# Patient Record
Sex: FEMALE | Race: WHITE | Employment: OTHER
[De-identification: names, ages, dates, MRNs, and addresses within clinical notes are randomized per-mention and may not be internally consistent; named-entity substitution may affect disease eponyms.]

---

## 2017-01-06 DIAGNOSIS — R10.11 RUQ ABDOMINAL PAIN: Primary | ICD-10-CM

## 2017-01-09 ENCOUNTER — TELEPHONE (OUTPATIENT)
Facility: CLINIC | Age: 77
End: 2017-01-09

## 2017-01-09 ENCOUNTER — OFFICE VISIT (OUTPATIENT)
Facility: CLINIC | Age: 77
End: 2017-01-09

## 2017-01-09 VITALS
HEART RATE: 96 BPM | OXYGEN SATURATION: 97 % | DIASTOLIC BLOOD PRESSURE: 72 MMHG | SYSTOLIC BLOOD PRESSURE: 130 MMHG | RESPIRATION RATE: 16 BRPM | BODY MASS INDEX: 31.51 KG/M2 | WEIGHT: 156 LBS

## 2017-01-09 DIAGNOSIS — N39.46 MIXED INCONTINENCE: ICD-10-CM

## 2017-01-09 DIAGNOSIS — K31.84 DIABETIC GASTROPARESIS (HCC): ICD-10-CM

## 2017-01-09 DIAGNOSIS — N39.41 URGE INCONTINENCE: ICD-10-CM

## 2017-01-09 DIAGNOSIS — E11.8 TYPE 2 DIABETES MELLITUS WITH COMPLICATION, WITHOUT LONG-TERM CURRENT USE OF INSULIN (HCC): ICD-10-CM

## 2017-01-09 DIAGNOSIS — G89.29 CHRONIC BILATERAL LOW BACK PAIN, WITH SCIATICA PRESENCE UNSPECIFIED: ICD-10-CM

## 2017-01-09 DIAGNOSIS — R07.89 CHEST TIGHTNESS OR PRESSURE: ICD-10-CM

## 2017-01-09 DIAGNOSIS — I10 ESSENTIAL HYPERTENSION: Primary | ICD-10-CM

## 2017-01-09 DIAGNOSIS — M54.5 CHRONIC BILATERAL LOW BACK PAIN, WITH SCIATICA PRESENCE UNSPECIFIED: ICD-10-CM

## 2017-01-09 DIAGNOSIS — K21.9 GASTROESOPHAGEAL REFLUX DISEASE WITHOUT ESOPHAGITIS: ICD-10-CM

## 2017-01-09 DIAGNOSIS — E11.43 DIABETIC GASTROPARESIS (HCC): ICD-10-CM

## 2017-01-09 DIAGNOSIS — E55.9 VITAMIN D DEFICIENCY: ICD-10-CM

## 2017-01-09 DIAGNOSIS — K44.9 HIATAL HERNIA: ICD-10-CM

## 2017-01-09 DIAGNOSIS — Z23 NEED FOR PROPHYLACTIC VACCINATION AGAINST STREPTOCOCCUS PNEUMONIAE (PNEUMOCOCCUS): ICD-10-CM

## 2017-01-09 DIAGNOSIS — E11.42 DIABETIC PERIPHERAL NEUROPATHY (HCC): ICD-10-CM

## 2017-01-09 DIAGNOSIS — D69.6 THROMBOCYTOPENIA (HCC): ICD-10-CM

## 2017-01-09 DIAGNOSIS — D64.9 ANEMIA, UNSPECIFIED TYPE: ICD-10-CM

## 2017-01-09 DIAGNOSIS — R41.81 AGE-RELATED COGNITIVE DECLINE: ICD-10-CM

## 2017-01-09 DIAGNOSIS — E78.00 PURE HYPERCHOLESTEROLEMIA: ICD-10-CM

## 2017-01-09 PROCEDURE — 90670 PCV13 VACCINE IM: CPT | Performed by: FAMILY MEDICINE

## 2017-01-09 PROCEDURE — G0009 ADMIN PNEUMOCOCCAL VACCINE: HCPCS | Performed by: FAMILY MEDICINE

## 2017-01-09 PROCEDURE — 99214 OFFICE O/P EST MOD 30 MIN: CPT | Performed by: FAMILY MEDICINE

## 2017-01-09 RX ORDER — GLIPIZIDE 5 MG/1
TABLET ORAL
Qty: 30 TABLET | Refills: 0
Start: 2017-01-09 | End: 2017-10-25 | Stop reason: SDUPTHER

## 2017-01-09 ASSESSMENT — ENCOUNTER SYMPTOMS
ABDOMINAL PAIN: 0
SHORTNESS OF BREATH: 0
NAUSEA: 0
CONSTIPATION: 0
CHEST TIGHTNESS: 1
SORE THROAT: 0
RHINORRHEA: 0
BACK PAIN: 1
COUGH: 0
DIARRHEA: 0
VOMITING: 0
ABDOMINAL DISTENTION: 0

## 2017-01-10 ENCOUNTER — TELEPHONE (OUTPATIENT)
Facility: CLINIC | Age: 77
End: 2017-01-10

## 2017-02-21 ENCOUNTER — HOSPITAL ENCOUNTER (OUTPATIENT)
Dept: CT IMAGING | Age: 77
Discharge: HOME | End: 2017-02-21
Payer: MEDICARE

## 2017-02-21 ENCOUNTER — HOSPITAL ENCOUNTER (OUTPATIENT)
Dept: INTERVENTIONAL RADIOLOGY/VASCULAR | Age: 77
Discharge: HOME | End: 2017-02-21
Payer: MEDICARE

## 2017-02-21 VITALS
RESPIRATION RATE: 20 BRPM | OXYGEN SATURATION: 94 % | WEIGHT: 157 LBS | SYSTOLIC BLOOD PRESSURE: 138 MMHG | HEIGHT: 59 IN | TEMPERATURE: 97.2 F | BODY MASS INDEX: 31.65 KG/M2 | DIASTOLIC BLOOD PRESSURE: 79 MMHG | HEART RATE: 80 BPM

## 2017-02-21 DIAGNOSIS — G89.29 CHRONIC BILATERAL LOW BACK PAIN, WITH SCIATICA PRESENCE UNSPECIFIED: ICD-10-CM

## 2017-02-21 DIAGNOSIS — M54.5 CHRONIC BILATERAL LOW BACK PAIN, WITH SCIATICA PRESENCE UNSPECIFIED: ICD-10-CM

## 2017-02-21 DIAGNOSIS — M54.10 RADICULOPATHY, UNSPECIFIED SPINAL REGION: ICD-10-CM

## 2017-02-21 LAB
INR BLD: 1
PARTIAL THROMBOPLASTIN TIME: 26.4 SEC (ref 23–31)
PLATELET # BLD: 86 K/UL (ref 150–450)
PROTHROMBIN TIME: 10.8 SEC (ref 9.7–12)

## 2017-02-21 PROCEDURE — 85049 AUTOMATED PLATELET COUNT: CPT

## 2017-02-21 PROCEDURE — 72126 CT NECK SPINE W/DYE: CPT

## 2017-02-21 PROCEDURE — G9500 RAD EXPOS IND/EXP TM DOC: HCPCS | Performed by: RADIOLOGY

## 2017-02-21 PROCEDURE — 6360000002 HC RX W HCPCS: Performed by: RADIOLOGY

## 2017-02-21 PROCEDURE — 7100000030 HC ASPR PHASE II RECOVERY - FIRST 15 MIN

## 2017-02-21 PROCEDURE — 85730 THROMBOPLASTIN TIME PARTIAL: CPT

## 2017-02-21 PROCEDURE — 6360000004 HC RX CONTRAST MEDICATION: Performed by: NEUROLOGICAL SURGERY

## 2017-02-21 PROCEDURE — 62284 INJECTION FOR MYELOGRAM: CPT | Performed by: RADIOLOGY

## 2017-02-21 PROCEDURE — 36415 COLL VENOUS BLD VENIPUNCTURE: CPT

## 2017-02-21 PROCEDURE — 72240 MYELOGRAPHY NECK SPINE: CPT

## 2017-02-21 PROCEDURE — 7100000031 HC ASPR PHASE II RECOVERY - ADDTL 15 MIN

## 2017-02-21 PROCEDURE — 2500000003 HC RX 250 WO HCPCS: Performed by: RADIOLOGY

## 2017-02-21 PROCEDURE — 77003 FLUOROGUIDE FOR SPINE INJECT: CPT | Performed by: RADIOLOGY

## 2017-02-21 PROCEDURE — 61055 INJECTION INTO BRAIN CANAL: CPT

## 2017-02-21 PROCEDURE — 85610 PROTHROMBIN TIME: CPT

## 2017-02-21 RX ORDER — ACETAMINOPHEN 325 MG/1
650 TABLET ORAL EVERY 4 HOURS PRN
Status: DISCONTINUED | OUTPATIENT
Start: 2017-02-21 | End: 2017-02-24 | Stop reason: HOSPADM

## 2017-02-21 RX ORDER — SODIUM CHLORIDE 0.9 % (FLUSH) 0.9 %
10 SYRINGE (ML) INJECTION PRN
Status: DISCONTINUED | OUTPATIENT
Start: 2017-02-21 | End: 2017-02-24 | Stop reason: HOSPADM

## 2017-02-21 RX ORDER — DIPHENHYDRAMINE HYDROCHLORIDE 50 MG/ML
50 INJECTION INTRAMUSCULAR; INTRAVENOUS ONCE
Status: COMPLETED | OUTPATIENT
Start: 2017-02-21 | End: 2017-02-21

## 2017-02-21 RX ORDER — METHYLPREDNISOLONE SODIUM SUCCINATE 125 MG/2ML
125 INJECTION, POWDER, LYOPHILIZED, FOR SOLUTION INTRAMUSCULAR; INTRAVENOUS ONCE
Status: COMPLETED | OUTPATIENT
Start: 2017-02-21 | End: 2017-02-21

## 2017-02-21 RX ORDER — LIDOCAINE HYDROCHLORIDE 10 MG/ML
INJECTION, SOLUTION EPIDURAL; INFILTRATION; INTRACAUDAL; PERINEURAL
Status: COMPLETED | OUTPATIENT
Start: 2017-02-21 | End: 2017-02-21

## 2017-02-21 RX ADMIN — IOHEXOL 20 ML: 300 INJECTION, SOLUTION INTRAVENOUS at 13:06

## 2017-02-21 RX ADMIN — DIPHENHYDRAMINE HYDROCHLORIDE 50 MG: 50 INJECTION, SOLUTION INTRAMUSCULAR; INTRAVENOUS at 12:15

## 2017-02-21 RX ADMIN — IOHEXOL 1 ML: 300 INJECTION, SOLUTION INTRAVENOUS at 13:38

## 2017-02-21 RX ADMIN — METHYLPREDNISOLONE SODIUM SUCCINATE 125 MG: 125 INJECTION, POWDER, FOR SOLUTION INTRAMUSCULAR; INTRAVENOUS at 12:15

## 2017-02-21 RX ADMIN — LIDOCAINE HYDROCHLORIDE 4 ML: 10 INJECTION, SOLUTION EPIDURAL; INFILTRATION; INTRACAUDAL; PERINEURAL at 12:39

## 2017-02-21 ASSESSMENT — PAIN SCALES - GENERAL
PAINLEVEL_OUTOF10: 0
PAINLEVEL_OUTOF10: 0

## 2017-03-20 ENCOUNTER — HOSPITAL ENCOUNTER (OUTPATIENT)
Dept: PHYSICAL THERAPY | Age: 77
Setting detail: THERAPIES SERIES
Discharge: HOME | End: 2017-03-20
Payer: MEDICARE

## 2017-03-20 DIAGNOSIS — E55.9 VITAMIN D DEFICIENCY: ICD-10-CM

## 2017-03-20 PROCEDURE — G8979 MOBILITY GOAL STATUS: HCPCS

## 2017-03-20 PROCEDURE — 97162 PT EVAL MOD COMPLEX 30 MIN: CPT

## 2017-03-20 PROCEDURE — G8978 MOBILITY CURRENT STATUS: HCPCS

## 2017-03-20 RX ORDER — ERGOCALCIFEROL 1.25 MG/1
CAPSULE ORAL
Qty: 12 CAPSULE | Refills: 0 | Status: SHIPPED | OUTPATIENT
Start: 2017-03-20 | End: 2017-06-08 | Stop reason: ALTCHOICE

## 2017-03-20 ASSESSMENT — ACTIVITIES OF DAILY LIVING (ADL): EFFECT OF PAIN ON DAILY ACTIVITIES: 100% OF HER ADL'S

## 2017-03-20 ASSESSMENT — PAIN DESCRIPTION - DESCRIPTORS: DESCRIPTORS: ACHING;BURNING;DISCOMFORT

## 2017-03-20 ASSESSMENT — PAIN DESCRIPTION - FREQUENCY: FREQUENCY: CONTINUOUS

## 2017-03-20 ASSESSMENT — PAIN DESCRIPTION - LOCATION: LOCATION: BACK

## 2017-03-20 ASSESSMENT — PAIN DESCRIPTION - PAIN TYPE: TYPE: SURGICAL PAIN

## 2017-03-20 ASSESSMENT — PAIN SCALES - GENERAL: PAINLEVEL_OUTOF10: 10

## 2017-03-20 ASSESSMENT — PAIN DESCRIPTION - ORIENTATION: ORIENTATION: RIGHT;LOWER

## 2017-03-20 ASSESSMENT — PAIN DESCRIPTION - ONSET: ONSET: ON-GOING

## 2017-03-20 ASSESSMENT — PAIN DESCRIPTION - PROGRESSION: CLINICAL_PROGRESSION: GRADUALLY WORSENING

## 2017-03-21 ENCOUNTER — HOSPITAL ENCOUNTER (OUTPATIENT)
Dept: PHYSICAL THERAPY | Age: 77
Setting detail: THERAPIES SERIES
Discharge: HOME | End: 2017-03-21
Payer: MEDICARE

## 2017-03-21 PROCEDURE — 97113 AQUATIC THERAPY/EXERCISES: CPT

## 2017-03-22 PROBLEM — Z98.1 S/P LUMBAR FUSION: Status: ACTIVE | Noted: 2017-03-22

## 2017-03-23 ENCOUNTER — HOSPITAL ENCOUNTER (OUTPATIENT)
Dept: PHYSICAL THERAPY | Age: 77
Setting detail: THERAPIES SERIES
Discharge: HOME | End: 2017-03-23
Payer: MEDICARE

## 2017-03-23 PROCEDURE — 97113 AQUATIC THERAPY/EXERCISES: CPT

## 2017-03-23 ASSESSMENT — PAIN SCALES - GENERAL: PAINLEVEL_OUTOF10: 7

## 2017-03-23 ASSESSMENT — PAIN DESCRIPTION - PAIN TYPE: TYPE: SURGICAL PAIN

## 2017-03-23 ASSESSMENT — PAIN DESCRIPTION - ORIENTATION: ORIENTATION: RIGHT

## 2017-03-23 ASSESSMENT — PAIN DESCRIPTION - LOCATION: LOCATION: BUTTOCKS

## 2017-03-28 ENCOUNTER — HOSPITAL ENCOUNTER (OUTPATIENT)
Dept: PHYSICAL THERAPY | Age: 77
Setting detail: THERAPIES SERIES
Discharge: HOME | End: 2017-03-28
Payer: MEDICARE

## 2017-03-28 PROCEDURE — 97113 AQUATIC THERAPY/EXERCISES: CPT

## 2017-03-28 ASSESSMENT — PAIN DESCRIPTION - ORIENTATION: ORIENTATION: LOWER;LEFT

## 2017-03-28 ASSESSMENT — PAIN DESCRIPTION - PAIN TYPE: TYPE: SURGICAL PAIN

## 2017-03-28 ASSESSMENT — PAIN DESCRIPTION - DESCRIPTORS: DESCRIPTORS: ACHING;BURNING;DISCOMFORT

## 2017-03-28 ASSESSMENT — PAIN DESCRIPTION - LOCATION: LOCATION: BACK;BUTTOCKS

## 2017-03-28 ASSESSMENT — PAIN SCALES - GENERAL: PAINLEVEL_OUTOF10: 3

## 2017-03-30 ENCOUNTER — HOSPITAL ENCOUNTER (OUTPATIENT)
Dept: PHYSICAL THERAPY | Age: 77
Setting detail: THERAPIES SERIES
Discharge: HOME | End: 2017-03-30
Payer: MEDICARE

## 2017-03-30 PROCEDURE — 97113 AQUATIC THERAPY/EXERCISES: CPT

## 2017-03-30 ASSESSMENT — PAIN SCALES - GENERAL: PAINLEVEL_OUTOF10: 5

## 2017-03-30 ASSESSMENT — PAIN DESCRIPTION - PAIN TYPE: TYPE: SURGICAL PAIN

## 2017-03-30 ASSESSMENT — PAIN DESCRIPTION - ORIENTATION: ORIENTATION: LOWER;LEFT;RIGHT

## 2017-03-30 ASSESSMENT — PAIN DESCRIPTION - LOCATION: LOCATION: BACK;BUTTOCKS

## 2017-03-31 ENCOUNTER — HOSPITAL ENCOUNTER (OUTPATIENT)
Dept: PHYSICAL THERAPY | Age: 77
Setting detail: THERAPIES SERIES
Discharge: HOME | End: 2017-03-31
Payer: MEDICARE

## 2017-03-31 PROCEDURE — 97113 AQUATIC THERAPY/EXERCISES: CPT

## 2017-03-31 ASSESSMENT — PAIN SCALES - GENERAL: PAINLEVEL_OUTOF10: 2

## 2017-03-31 ASSESSMENT — PAIN DESCRIPTION - LOCATION: LOCATION: BACK;BUTTOCKS

## 2017-03-31 ASSESSMENT — PAIN DESCRIPTION - PAIN TYPE: TYPE: SURGICAL PAIN

## 2017-03-31 ASSESSMENT — PAIN DESCRIPTION - ORIENTATION: ORIENTATION: LOWER;LEFT;RIGHT

## 2017-04-04 ENCOUNTER — HOSPITAL ENCOUNTER (OUTPATIENT)
Dept: PHYSICAL THERAPY | Age: 77
Setting detail: THERAPIES SERIES
Discharge: HOME | End: 2017-04-04
Payer: MEDICARE

## 2017-04-04 PROCEDURE — 97113 AQUATIC THERAPY/EXERCISES: CPT

## 2017-04-04 ASSESSMENT — PAIN DESCRIPTION - PAIN TYPE: TYPE: SURGICAL PAIN

## 2017-04-04 ASSESSMENT — PAIN DESCRIPTION - LOCATION: LOCATION: BACK;BUTTOCKS

## 2017-04-04 ASSESSMENT — PAIN DESCRIPTION - ORIENTATION: ORIENTATION: LOWER;LEFT;RIGHT

## 2017-04-04 ASSESSMENT — PAIN SCALES - GENERAL: PAINLEVEL_OUTOF10: 4

## 2017-04-06 ENCOUNTER — HOSPITAL ENCOUNTER (OUTPATIENT)
Dept: PHYSICAL THERAPY | Age: 77
Setting detail: THERAPIES SERIES
Discharge: HOME | End: 2017-04-06
Payer: MEDICARE

## 2017-04-06 ENCOUNTER — HOSPITAL ENCOUNTER (OUTPATIENT)
Age: 77
Setting detail: SPECIMEN
Discharge: HOME | End: 2017-04-06
Payer: MEDICARE

## 2017-04-06 DIAGNOSIS — R41.81 AGE-RELATED COGNITIVE DECLINE: ICD-10-CM

## 2017-04-06 DIAGNOSIS — E11.42 DIABETIC PERIPHERAL NEUROPATHY (HCC): ICD-10-CM

## 2017-04-06 DIAGNOSIS — D64.9 ANEMIA, UNSPECIFIED TYPE: ICD-10-CM

## 2017-04-06 DIAGNOSIS — E11.8 TYPE 2 DIABETES MELLITUS WITH COMPLICATION, WITHOUT LONG-TERM CURRENT USE OF INSULIN (HCC): ICD-10-CM

## 2017-04-06 DIAGNOSIS — I10 ESSENTIAL HYPERTENSION: ICD-10-CM

## 2017-04-06 DIAGNOSIS — D69.6 THROMBOCYTOPENIA (HCC): ICD-10-CM

## 2017-04-06 DIAGNOSIS — K31.84 DIABETIC GASTROPARESIS (HCC): ICD-10-CM

## 2017-04-06 DIAGNOSIS — E11.43 DIABETIC GASTROPARESIS (HCC): ICD-10-CM

## 2017-04-06 LAB
ANION GAP SERPL CALCULATED.3IONS-SCNC: 16 MMOL/L (ref 9–17)
BUN BLDV-MCNC: 17 MG/DL (ref 8–23)
BUN/CREAT BLD: NORMAL (ref 9–20)
CALCIUM SERPL-MCNC: 9.3 MG/DL (ref 8.6–10.4)
CHLORIDE BLD-SCNC: 105 MMOL/L (ref 98–107)
CO2: 21 MMOL/L (ref 20–31)
CREAT SERPL-MCNC: 0.69 MG/DL (ref 0.5–0.9)
CREATININE URINE: 105.7 MG/DL (ref 28–217)
ESTIMATED AVERAGE GLUCOSE: 123 MG/DL
GFR AFRICAN AMERICAN: >60 ML/MIN
GFR NON-AFRICAN AMERICAN: >60 ML/MIN
GFR SERPL CREATININE-BSD FRML MDRD: NORMAL ML/MIN/{1.73_M2}
GFR SERPL CREATININE-BSD FRML MDRD: NORMAL ML/MIN/{1.73_M2}
GLUCOSE BLD-MCNC: 95 MG/DL (ref 70–99)
HBA1C MFR BLD: 5.9 % (ref 4–6)
HCT VFR BLD CALC: 29.8 % (ref 36–46)
HEMOGLOBIN: 9.9 G/DL (ref 12–16)
MCH RBC QN AUTO: 27 PG (ref 26–34)
MCHC RBC AUTO-ENTMCNC: 33.3 G/DL (ref 31–37)
MCV RBC AUTO: 81.2 FL (ref 80–100)
MICROALBUMIN/CREAT 24H UR: 13 MG/L
MICROALBUMIN/CREAT UR-RTO: 12 MCG/MG CREAT
PDW BLD-RTO: 15.4 % (ref 12.5–15.4)
PLATELET # BLD: 83 K/UL (ref 140–450)
PMV BLD AUTO: 11.9 FL (ref 6–12)
POTASSIUM SERPL-SCNC: 4 MMOL/L (ref 3.7–5.3)
RBC # BLD: 3.67 M/UL (ref 4–5.2)
SODIUM BLD-SCNC: 142 MMOL/L (ref 135–144)
WBC # BLD: 5.2 K/UL (ref 3.5–11)

## 2017-04-06 PROCEDURE — 97113 AQUATIC THERAPY/EXERCISES: CPT

## 2017-04-07 ENCOUNTER — HOSPITAL ENCOUNTER (OUTPATIENT)
Dept: PHYSICAL THERAPY | Age: 77
Setting detail: THERAPIES SERIES
Discharge: HOME | End: 2017-04-07
Payer: MEDICARE

## 2017-04-07 PROCEDURE — 97113 AQUATIC THERAPY/EXERCISES: CPT

## 2017-04-07 ASSESSMENT — PAIN DESCRIPTION - PAIN TYPE: TYPE: SURGICAL PAIN

## 2017-04-07 ASSESSMENT — PAIN DESCRIPTION - ORIENTATION: ORIENTATION: LOWER

## 2017-04-07 ASSESSMENT — PAIN DESCRIPTION - LOCATION: LOCATION: BACK;SACRUM

## 2017-04-07 ASSESSMENT — PAIN SCALES - GENERAL: PAINLEVEL_OUTOF10: 4

## 2017-04-10 ENCOUNTER — HOSPITAL ENCOUNTER (OUTPATIENT)
Dept: PHYSICAL THERAPY | Age: 77
Setting detail: THERAPIES SERIES
Discharge: HOME | End: 2017-04-10
Payer: MEDICARE

## 2017-04-10 PROCEDURE — 97113 AQUATIC THERAPY/EXERCISES: CPT

## 2017-04-10 ASSESSMENT — PAIN DESCRIPTION - ORIENTATION: ORIENTATION: LOWER

## 2017-04-10 ASSESSMENT — PAIN SCALES - GENERAL: PAINLEVEL_OUTOF10: 2

## 2017-04-10 ASSESSMENT — PAIN DESCRIPTION - PAIN TYPE: TYPE: SURGICAL PAIN

## 2017-04-10 ASSESSMENT — PAIN DESCRIPTION - LOCATION: LOCATION: BACK;SACRUM

## 2017-04-12 ENCOUNTER — HOSPITAL ENCOUNTER (OUTPATIENT)
Dept: PHYSICAL THERAPY | Age: 77
Setting detail: THERAPIES SERIES
Discharge: HOME | End: 2017-04-12
Payer: MEDICARE

## 2017-04-12 PROBLEM — D69.3 CHRONIC ITP (IDIOPATHIC THROMBOCYTOPENIA) (HCC): Status: ACTIVE | Noted: 2017-04-12

## 2017-04-12 PROCEDURE — G8978 MOBILITY CURRENT STATUS: HCPCS

## 2017-04-12 PROCEDURE — 97113 AQUATIC THERAPY/EXERCISES: CPT

## 2017-04-12 PROCEDURE — G8979 MOBILITY GOAL STATUS: HCPCS

## 2017-04-13 ENCOUNTER — HOSPITAL ENCOUNTER (OUTPATIENT)
Dept: PHYSICAL THERAPY | Age: 77
Setting detail: THERAPIES SERIES
Discharge: HOME | End: 2017-04-13
Payer: MEDICARE

## 2017-04-13 PROCEDURE — 97164 PT RE-EVAL EST PLAN CARE: CPT

## 2017-04-13 PROCEDURE — 97113 AQUATIC THERAPY/EXERCISES: CPT

## 2017-04-13 ASSESSMENT — PAIN DESCRIPTION - LOCATION
LOCATION: BACK;SACRUM
LOCATION: BACK;SACRUM

## 2017-04-13 ASSESSMENT — PAIN SCALES - GENERAL
PAINLEVEL_OUTOF10: 2
PAINLEVEL_OUTOF10: 2

## 2017-04-13 ASSESSMENT — PAIN DESCRIPTION - PAIN TYPE
TYPE: SURGICAL PAIN
TYPE: SURGICAL PAIN

## 2017-04-13 ASSESSMENT — PAIN DESCRIPTION - DESCRIPTORS
DESCRIPTORS: ACHING;CRAMPING
DESCRIPTORS: ACHING;CRAMPING

## 2017-04-13 ASSESSMENT — PAIN DESCRIPTION - DIRECTION: RADIATING_TOWARDS: LEFT LE

## 2017-04-13 ASSESSMENT — PAIN DESCRIPTION - FREQUENCY: FREQUENCY: INTERMITTENT

## 2017-04-13 ASSESSMENT — PAIN DESCRIPTION - ORIENTATION
ORIENTATION: LOWER
ORIENTATION: LOWER

## 2017-04-20 ENCOUNTER — HOSPITAL ENCOUNTER (OUTPATIENT)
Dept: PHYSICAL THERAPY | Age: 77
Setting detail: THERAPIES SERIES
Discharge: HOME | End: 2017-04-20
Payer: MEDICARE

## 2017-04-20 PROCEDURE — 97113 AQUATIC THERAPY/EXERCISES: CPT

## 2017-04-20 ASSESSMENT — PAIN DESCRIPTION - ORIENTATION: ORIENTATION: LOWER;RIGHT

## 2017-04-20 ASSESSMENT — PAIN SCALES - GENERAL: PAINLEVEL_OUTOF10: 4

## 2017-04-20 ASSESSMENT — PAIN DESCRIPTION - PAIN TYPE: TYPE: SURGICAL PAIN

## 2017-04-20 ASSESSMENT — PAIN DESCRIPTION - LOCATION: LOCATION: BACK;BUTTOCKS

## 2017-04-21 ENCOUNTER — HOSPITAL ENCOUNTER (OUTPATIENT)
Dept: PHYSICAL THERAPY | Age: 77
Setting detail: THERAPIES SERIES
Discharge: HOME | End: 2017-04-21
Payer: MEDICARE

## 2017-04-21 PROCEDURE — 97113 AQUATIC THERAPY/EXERCISES: CPT

## 2017-04-21 ASSESSMENT — PAIN DESCRIPTION - DESCRIPTORS: DESCRIPTORS: ACHING;CRAMPING

## 2017-04-21 ASSESSMENT — PAIN DESCRIPTION - ORIENTATION: ORIENTATION: LOWER;RIGHT

## 2017-04-21 ASSESSMENT — PAIN SCALES - GENERAL: PAINLEVEL_OUTOF10: 3

## 2017-04-21 ASSESSMENT — PAIN DESCRIPTION - LOCATION: LOCATION: BACK;BUTTOCKS

## 2017-04-21 ASSESSMENT — PAIN DESCRIPTION - PAIN TYPE: TYPE: SURGICAL PAIN

## 2017-04-24 ENCOUNTER — HOSPITAL ENCOUNTER (OUTPATIENT)
Dept: PHYSICAL THERAPY | Age: 77
Setting detail: THERAPIES SERIES
Discharge: HOME | End: 2017-04-24
Payer: MEDICARE

## 2017-04-24 PROCEDURE — 97113 AQUATIC THERAPY/EXERCISES: CPT

## 2017-04-24 ASSESSMENT — PAIN DESCRIPTION - ORIENTATION: ORIENTATION: LOWER;RIGHT

## 2017-04-24 ASSESSMENT — PAIN SCALES - GENERAL: PAINLEVEL_OUTOF10: 1

## 2017-04-24 ASSESSMENT — PAIN DESCRIPTION - PAIN TYPE: TYPE: SURGICAL PAIN

## 2017-04-24 ASSESSMENT — PAIN DESCRIPTION - DESCRIPTORS: DESCRIPTORS: ACHING

## 2017-04-24 ASSESSMENT — PAIN DESCRIPTION - LOCATION: LOCATION: BACK;BUTTOCKS

## 2017-04-26 ENCOUNTER — HOSPITAL ENCOUNTER (OUTPATIENT)
Dept: PHYSICAL THERAPY | Age: 77
Setting detail: THERAPIES SERIES
Discharge: HOME | End: 2017-04-26
Payer: MEDICARE

## 2017-04-26 ENCOUNTER — OFFICE VISIT (OUTPATIENT)
Dept: GASTROENTEROLOGY | Age: 77
End: 2017-04-26
Payer: MEDICARE

## 2017-04-26 VITALS
WEIGHT: 156.8 LBS | RESPIRATION RATE: 12 BRPM | BODY MASS INDEX: 31.61 KG/M2 | HEIGHT: 59 IN | OXYGEN SATURATION: 98 % | TEMPERATURE: 98 F

## 2017-04-26 DIAGNOSIS — K63.5 COLON POLYP: ICD-10-CM

## 2017-04-26 DIAGNOSIS — K44.9 HIATAL HERNIA: Primary | ICD-10-CM

## 2017-04-26 DIAGNOSIS — R11.0 NAUSEA: ICD-10-CM

## 2017-04-26 DIAGNOSIS — R10.13 EPIGASTRIC BURNING SENSATION: ICD-10-CM

## 2017-04-26 DIAGNOSIS — K21.9 GASTROESOPHAGEAL REFLUX DISEASE WITHOUT ESOPHAGITIS: ICD-10-CM

## 2017-04-26 PROCEDURE — 4040F PNEUMOC VAC/ADMIN/RCVD: CPT | Performed by: INTERNAL MEDICINE

## 2017-04-26 PROCEDURE — G8427 DOCREV CUR MEDS BY ELIG CLIN: HCPCS | Performed by: INTERNAL MEDICINE

## 2017-04-26 PROCEDURE — 3017F COLORECTAL CA SCREEN DOC REV: CPT | Performed by: INTERNAL MEDICINE

## 2017-04-26 PROCEDURE — G8598 ASA/ANTIPLAT THER USED: HCPCS | Performed by: INTERNAL MEDICINE

## 2017-04-26 PROCEDURE — G8400 PT W/DXA NO RESULTS DOC: HCPCS | Performed by: INTERNAL MEDICINE

## 2017-04-26 PROCEDURE — 1036F TOBACCO NON-USER: CPT | Performed by: INTERNAL MEDICINE

## 2017-04-26 PROCEDURE — 1090F PRES/ABSN URINE INCON ASSESS: CPT | Performed by: INTERNAL MEDICINE

## 2017-04-26 PROCEDURE — 1123F ACP DISCUSS/DSCN MKR DOCD: CPT | Performed by: INTERNAL MEDICINE

## 2017-04-26 PROCEDURE — G8417 CALC BMI ABV UP PARAM F/U: HCPCS | Performed by: INTERNAL MEDICINE

## 2017-04-26 PROCEDURE — 99214 OFFICE O/P EST MOD 30 MIN: CPT | Performed by: INTERNAL MEDICINE

## 2017-04-26 RX ORDER — PANTOPRAZOLE SODIUM 20 MG/1
40 TABLET, DELAYED RELEASE ORAL 2 TIMES DAILY
Qty: 180 TABLET | Refills: 3 | Status: SHIPPED | OUTPATIENT
Start: 2017-04-26 | End: 2017-07-18 | Stop reason: DRUGHIGH

## 2017-04-26 RX ORDER — SUCRALFATE ORAL 1 G/10ML
1 SUSPENSION ORAL 4 TIMES DAILY
Qty: 1200 ML | Refills: 3 | Status: SHIPPED | OUTPATIENT
Start: 2017-04-26 | End: 2017-07-18 | Stop reason: SDUPTHER

## 2017-04-26 ASSESSMENT — ENCOUNTER SYMPTOMS
DIARRHEA: 0
ABDOMINAL DISTENTION: 0
CONSTIPATION: 1
SHORTNESS OF BREATH: 0
VOMITING: 1
SINUS PRESSURE: 1
ANAL BLEEDING: 0
COUGH: 1
RECTAL PAIN: 0
ABDOMINAL PAIN: 0
BACK PAIN: 1
ALLERGIC/IMMUNOLOGIC NEGATIVE: 1
BLOOD IN STOOL: 0
NAUSEA: 1

## 2017-04-28 ENCOUNTER — HOSPITAL ENCOUNTER (OUTPATIENT)
Dept: PHYSICAL THERAPY | Age: 77
Setting detail: THERAPIES SERIES
Discharge: HOME | End: 2017-04-28
Payer: MEDICARE

## 2017-04-28 PROCEDURE — 97113 AQUATIC THERAPY/EXERCISES: CPT

## 2017-04-28 ASSESSMENT — PAIN DESCRIPTION - LOCATION: LOCATION: BACK

## 2017-04-28 ASSESSMENT — PAIN DESCRIPTION - PAIN TYPE: TYPE: SURGICAL PAIN

## 2017-04-28 ASSESSMENT — PAIN DESCRIPTION - ORIENTATION: ORIENTATION: LOWER;LEFT;RIGHT

## 2017-04-28 ASSESSMENT — PAIN SCALES - GENERAL: PAINLEVEL_OUTOF10: 4

## 2017-05-03 ENCOUNTER — HOSPITAL ENCOUNTER (OUTPATIENT)
Dept: PHYSICAL THERAPY | Age: 77
Setting detail: THERAPIES SERIES
Discharge: HOME | End: 2017-05-03
Payer: MEDICARE

## 2017-05-03 PROCEDURE — 97113 AQUATIC THERAPY/EXERCISES: CPT

## 2017-05-03 ASSESSMENT — PAIN DESCRIPTION - LOCATION: LOCATION: BACK

## 2017-05-03 ASSESSMENT — PAIN SCALES - GENERAL: PAINLEVEL_OUTOF10: 2

## 2017-05-03 ASSESSMENT — PAIN DESCRIPTION - ORIENTATION: ORIENTATION: LOWER;LEFT;RIGHT

## 2017-05-03 ASSESSMENT — PAIN DESCRIPTION - PAIN TYPE: TYPE: SURGICAL PAIN

## 2017-05-04 ENCOUNTER — HOSPITAL ENCOUNTER (OUTPATIENT)
Dept: PHYSICAL THERAPY | Age: 77
Setting detail: THERAPIES SERIES
Discharge: HOME | End: 2017-05-04
Payer: MEDICARE

## 2017-05-04 PROCEDURE — G8978 MOBILITY CURRENT STATUS: HCPCS

## 2017-05-04 PROCEDURE — 97113 AQUATIC THERAPY/EXERCISES: CPT

## 2017-05-04 PROCEDURE — 97164 PT RE-EVAL EST PLAN CARE: CPT

## 2017-05-04 PROCEDURE — G8979 MOBILITY GOAL STATUS: HCPCS

## 2017-05-04 ASSESSMENT — PAIN DESCRIPTION - DIRECTION: RADIATING_TOWARDS: R BUTTOCK

## 2017-05-04 ASSESSMENT — PAIN DESCRIPTION - PAIN TYPE
TYPE: SURGICAL PAIN
TYPE: SURGICAL PAIN

## 2017-05-04 ASSESSMENT — PAIN SCALES - GENERAL
PAINLEVEL_OUTOF10: 2
PAINLEVEL_OUTOF10: 2

## 2017-05-04 ASSESSMENT — PAIN DESCRIPTION - LOCATION
LOCATION: BACK
LOCATION: BACK

## 2017-05-04 ASSESSMENT — PAIN DESCRIPTION - ORIENTATION: ORIENTATION: LOWER;LEFT;RIGHT

## 2017-05-08 ENCOUNTER — HOSPITAL ENCOUNTER (OUTPATIENT)
Dept: NUCLEAR MEDICINE | Age: 77
Discharge: HOME | End: 2017-05-08
Payer: MEDICARE

## 2017-05-08 DIAGNOSIS — R10.13 EPIGASTRIC BURNING SENSATION: ICD-10-CM

## 2017-05-08 DIAGNOSIS — K63.5 COLON POLYP: ICD-10-CM

## 2017-05-08 DIAGNOSIS — K21.9 GASTROESOPHAGEAL REFLUX DISEASE WITHOUT ESOPHAGITIS: ICD-10-CM

## 2017-05-08 DIAGNOSIS — R11.0 NAUSEA: ICD-10-CM

## 2017-05-08 DIAGNOSIS — K44.9 HIATAL HERNIA: ICD-10-CM

## 2017-05-08 PROCEDURE — A9541 TC99M SULFUR COLLOID: HCPCS | Performed by: INTERNAL MEDICINE

## 2017-05-08 PROCEDURE — 78264 GASTRIC EMPTYING IMG STUDY: CPT

## 2017-05-08 PROCEDURE — 3430000000 HC RX DIAGNOSTIC RADIOPHARMACEUTICAL: Performed by: INTERNAL MEDICINE

## 2017-05-08 RX ADMIN — Medication 1.19 MILLICURIE: at 08:32

## 2017-05-11 ENCOUNTER — HOSPITAL ENCOUNTER (OUTPATIENT)
Dept: PHYSICAL THERAPY | Age: 77
Setting detail: THERAPIES SERIES
Discharge: HOME | End: 2017-05-11
Payer: MEDICARE

## 2017-05-11 PROCEDURE — 97110 THERAPEUTIC EXERCISES: CPT

## 2017-05-11 ASSESSMENT — PAIN DESCRIPTION - LOCATION: LOCATION: BACK

## 2017-05-11 ASSESSMENT — PAIN SCALES - GENERAL: PAINLEVEL_OUTOF10: 6

## 2017-05-11 ASSESSMENT — PAIN DESCRIPTION - ORIENTATION: ORIENTATION: RIGHT;LOWER

## 2017-05-11 ASSESSMENT — PAIN DESCRIPTION - DIRECTION: RADIATING_TOWARDS: RIGHT BUTTOCK

## 2017-05-11 ASSESSMENT — PAIN DESCRIPTION - PAIN TYPE: TYPE: SURGICAL PAIN

## 2017-05-12 ENCOUNTER — HOSPITAL ENCOUNTER (OUTPATIENT)
Dept: NUCLEAR MEDICINE | Age: 77
Discharge: HOME | End: 2017-05-12
Payer: MEDICARE

## 2017-05-12 ENCOUNTER — HOSPITAL ENCOUNTER (OUTPATIENT)
Dept: PHYSICAL THERAPY | Age: 77
Setting detail: THERAPIES SERIES
Discharge: HOME | End: 2017-05-12
Payer: MEDICARE

## 2017-05-12 DIAGNOSIS — K21.9 GASTROESOPHAGEAL REFLUX DISEASE, ESOPHAGITIS PRESENCE NOT SPECIFIED: ICD-10-CM

## 2017-05-12 DIAGNOSIS — K44.9 HIATAL HERNIA: ICD-10-CM

## 2017-05-12 DIAGNOSIS — R11.0 NAUSEA: ICD-10-CM

## 2017-05-12 DIAGNOSIS — R10.13 EPIGASTRIC BURNING SENSATION: ICD-10-CM

## 2017-05-12 DIAGNOSIS — K44.9 HIATAL HERNIA: Primary | ICD-10-CM

## 2017-05-12 PROCEDURE — 78264 GASTRIC EMPTYING IMG STUDY: CPT

## 2017-05-12 PROCEDURE — A9541 TC99M SULFUR COLLOID: HCPCS | Performed by: INTERNAL MEDICINE

## 2017-05-12 PROCEDURE — 3430000000 HC RX DIAGNOSTIC RADIOPHARMACEUTICAL: Performed by: INTERNAL MEDICINE

## 2017-05-12 RX ADMIN — Medication 1.2 MILLICURIE: at 10:05

## 2017-05-15 ENCOUNTER — HOSPITAL ENCOUNTER (OUTPATIENT)
Dept: PHYSICAL THERAPY | Age: 77
Setting detail: THERAPIES SERIES
Discharge: HOME | End: 2017-05-15
Payer: MEDICARE

## 2017-05-15 PROCEDURE — G8980 MOBILITY D/C STATUS: HCPCS

## 2017-05-15 PROCEDURE — G8979 MOBILITY GOAL STATUS: HCPCS

## 2017-05-15 PROCEDURE — 97164 PT RE-EVAL EST PLAN CARE: CPT

## 2017-05-15 ASSESSMENT — PAIN SCALES - GENERAL: PAINLEVEL_OUTOF10: 6

## 2017-05-15 ASSESSMENT — PAIN DESCRIPTION - PAIN TYPE: TYPE: SURGICAL PAIN

## 2017-05-15 ASSESSMENT — PAIN DESCRIPTION - LOCATION: LOCATION: BACK

## 2017-05-15 ASSESSMENT — PAIN DESCRIPTION - ORIENTATION: ORIENTATION: RIGHT;LOWER

## 2017-05-15 ASSESSMENT — PAIN DESCRIPTION - DIRECTION: RADIATING_TOWARDS: RIGHT BUTTOCK

## 2017-05-17 ENCOUNTER — APPOINTMENT (OUTPATIENT)
Dept: PHYSICAL THERAPY | Age: 77
End: 2017-05-17
Payer: MEDICARE

## 2017-06-06 ENCOUNTER — HOSPITAL ENCOUNTER (OUTPATIENT)
Facility: CLINIC | Age: 77
Discharge: HOME | End: 2017-06-06
Payer: MEDICARE

## 2017-06-06 ENCOUNTER — HOSPITAL ENCOUNTER (OUTPATIENT)
Dept: GENERAL RADIOLOGY | Facility: CLINIC | Age: 77
Discharge: HOME | End: 2017-06-06
Payer: MEDICARE

## 2017-06-06 ENCOUNTER — HOSPITAL ENCOUNTER (OUTPATIENT)
Age: 77
Setting detail: SPECIMEN
Discharge: HOME | End: 2017-06-06
Payer: MEDICARE

## 2017-06-06 DIAGNOSIS — D69.3 CHRONIC ITP (IDIOPATHIC THROMBOCYTOPENIA) (HCC): ICD-10-CM

## 2017-06-06 DIAGNOSIS — E11.8 TYPE 2 DIABETES MELLITUS WITH COMPLICATION, WITHOUT LONG-TERM CURRENT USE OF INSULIN (HCC): ICD-10-CM

## 2017-06-06 DIAGNOSIS — E11.42 DIABETIC PERIPHERAL NEUROPATHY (HCC): ICD-10-CM

## 2017-06-06 DIAGNOSIS — E78.00 PURE HYPERCHOLESTEROLEMIA: ICD-10-CM

## 2017-06-06 DIAGNOSIS — D64.9 ANEMIA, UNSPECIFIED TYPE: ICD-10-CM

## 2017-06-06 DIAGNOSIS — E53.8 B12 DEFICIENCY: ICD-10-CM

## 2017-06-06 DIAGNOSIS — M54.5 LOW BACK PAIN, UNSPECIFIED BACK PAIN LATERALITY, UNSPECIFIED CHRONICITY, WITH SCIATICA PRESENCE UNSPECIFIED: ICD-10-CM

## 2017-06-06 DIAGNOSIS — E55.9 VITAMIN D DEFICIENCY: ICD-10-CM

## 2017-06-06 DIAGNOSIS — I10 ESSENTIAL HYPERTENSION: ICD-10-CM

## 2017-06-06 LAB
ALBUMIN SERPL-MCNC: 4.5 G/DL (ref 3.5–5.2)
ALBUMIN/GLOBULIN RATIO: 1.7 (ref 1–2.5)
ALP BLD-CCNC: 54 U/L (ref 35–104)
ALT SERPL-CCNC: 12 U/L (ref 5–33)
ANION GAP SERPL CALCULATED.3IONS-SCNC: 15 MMOL/L (ref 9–17)
AST SERPL-CCNC: 15 U/L
BILIRUB SERPL-MCNC: 0.55 MG/DL (ref 0.3–1.2)
BUN BLDV-MCNC: 20 MG/DL (ref 8–23)
BUN/CREAT BLD: ABNORMAL (ref 9–20)
CALCIUM SERPL-MCNC: 9.6 MG/DL (ref 8.6–10.4)
CHLORIDE BLD-SCNC: 102 MMOL/L (ref 98–107)
CHOLESTEROL/HDL RATIO: 2.4
CHOLESTEROL: 130 MG/DL
CO2: 23 MMOL/L (ref 20–31)
CREAT SERPL-MCNC: 0.73 MG/DL (ref 0.5–0.9)
CREATININE URINE: 65.4 MG/DL (ref 28–217)
ESTIMATED AVERAGE GLUCOSE: 128 MG/DL
GFR AFRICAN AMERICAN: >60 ML/MIN
GFR NON-AFRICAN AMERICAN: >60 ML/MIN
GFR SERPL CREATININE-BSD FRML MDRD: ABNORMAL ML/MIN/{1.73_M2}
GFR SERPL CREATININE-BSD FRML MDRD: ABNORMAL ML/MIN/{1.73_M2}
GLUCOSE BLD-MCNC: 110 MG/DL (ref 70–99)
HBA1C MFR BLD: 6.1 % (ref 4–6)
HCT VFR BLD CALC: 33.4 % (ref 36–46)
HDLC SERPL-MCNC: 55 MG/DL
HEMOGLOBIN: 10.4 G/DL (ref 12–16)
LDL CHOLESTEROL: 53 MG/DL (ref 0–130)
MCH RBC QN AUTO: 24 PG (ref 26–34)
MCHC RBC AUTO-ENTMCNC: 31.2 G/DL (ref 31–37)
MCV RBC AUTO: 77.1 FL (ref 80–100)
MICROALBUMIN/CREAT 24H UR: <12 MG/L
MICROALBUMIN/CREAT UR-RTO: 18 MCG/MG CREAT
PDW BLD-RTO: 16.8 % (ref 12.5–15.4)
PLATELET # BLD: 100 K/UL (ref 140–450)
PMV BLD AUTO: ABNORMAL FL (ref 6–12)
POTASSIUM SERPL-SCNC: 4.4 MMOL/L (ref 3.7–5.3)
RBC # BLD: 4.33 M/UL (ref 4–5.2)
SODIUM BLD-SCNC: 140 MMOL/L (ref 135–144)
TOTAL PROTEIN: 7.1 G/DL (ref 6.4–8.3)
TRIGL SERPL-MCNC: 108 MG/DL
VITAMIN B-12: 309 PG/ML (ref 211–946)
VITAMIN D 25-HYDROXY: 27.8 NG/ML (ref 30–100)
VLDLC SERPL CALC-MCNC: NORMAL MG/DL (ref 1–30)
WBC # BLD: 6.9 K/UL (ref 3.5–11)

## 2017-06-06 PROCEDURE — 72100 X-RAY EXAM L-S SPINE 2/3 VWS: CPT

## 2017-06-16 ENCOUNTER — OFFICE VISIT (OUTPATIENT)
Dept: GASTROENTEROLOGY | Age: 77
End: 2017-06-16
Payer: MEDICARE

## 2017-06-16 VITALS
OXYGEN SATURATION: 98 % | BODY MASS INDEX: 31.47 KG/M2 | SYSTOLIC BLOOD PRESSURE: 137 MMHG | DIASTOLIC BLOOD PRESSURE: 78 MMHG | WEIGHT: 156.1 LBS | HEART RATE: 77 BPM | TEMPERATURE: 97.2 F | RESPIRATION RATE: 14 BRPM | HEIGHT: 59 IN

## 2017-06-16 DIAGNOSIS — K44.9 HIATAL HERNIA: ICD-10-CM

## 2017-06-16 DIAGNOSIS — K21.9 GASTROESOPHAGEAL REFLUX DISEASE WITHOUT ESOPHAGITIS: Primary | ICD-10-CM

## 2017-06-16 DIAGNOSIS — K63.5 COLON POLYP: ICD-10-CM

## 2017-06-16 DIAGNOSIS — R07.89 ATYPICAL CHEST PAIN: ICD-10-CM

## 2017-06-16 PROCEDURE — 3017F COLORECTAL CA SCREEN DOC REV: CPT | Performed by: INTERNAL MEDICINE

## 2017-06-16 PROCEDURE — G8598 ASA/ANTIPLAT THER USED: HCPCS | Performed by: INTERNAL MEDICINE

## 2017-06-16 PROCEDURE — 4040F PNEUMOC VAC/ADMIN/RCVD: CPT | Performed by: INTERNAL MEDICINE

## 2017-06-16 PROCEDURE — G8427 DOCREV CUR MEDS BY ELIG CLIN: HCPCS | Performed by: INTERNAL MEDICINE

## 2017-06-16 PROCEDURE — 1036F TOBACCO NON-USER: CPT | Performed by: INTERNAL MEDICINE

## 2017-06-16 PROCEDURE — 1090F PRES/ABSN URINE INCON ASSESS: CPT | Performed by: INTERNAL MEDICINE

## 2017-06-16 PROCEDURE — 99214 OFFICE O/P EST MOD 30 MIN: CPT | Performed by: INTERNAL MEDICINE

## 2017-06-16 PROCEDURE — G8417 CALC BMI ABV UP PARAM F/U: HCPCS | Performed by: INTERNAL MEDICINE

## 2017-06-16 PROCEDURE — G8400 PT W/DXA NO RESULTS DOC: HCPCS | Performed by: INTERNAL MEDICINE

## 2017-06-16 PROCEDURE — 1123F ACP DISCUSS/DSCN MKR DOCD: CPT | Performed by: INTERNAL MEDICINE

## 2017-06-16 RX ORDER — PANTOPRAZOLE SODIUM 40 MG/1
40 TABLET, DELAYED RELEASE ORAL 2 TIMES DAILY
Qty: 180 TABLET | Refills: 3 | Status: SHIPPED | OUTPATIENT
Start: 2017-06-16 | End: 2018-06-08 | Stop reason: ALTCHOICE

## 2017-06-16 ASSESSMENT — ENCOUNTER SYMPTOMS
DIARRHEA: 0
ANAL BLEEDING: 0
BACK PAIN: 1
NAUSEA: 1
SHORTNESS OF BREATH: 0
ALLERGIC/IMMUNOLOGIC NEGATIVE: 1
RECTAL PAIN: 0
RESPIRATORY NEGATIVE: 1
CONSTIPATION: 0
BLOOD IN STOOL: 0
ABDOMINAL PAIN: 0
SINUS PRESSURE: 1
COUGH: 0
ABDOMINAL DISTENTION: 0
VOMITING: 0

## 2017-07-20 ENCOUNTER — HOSPITAL ENCOUNTER (OUTPATIENT)
Age: 77
Discharge: HOME | End: 2017-07-20
Payer: MEDICARE

## 2017-08-11 ENCOUNTER — OFFICE VISIT (OUTPATIENT)
Dept: ORTHOPEDIC SURGERY | Age: 77
End: 2017-08-11
Payer: MEDICARE

## 2017-08-11 VITALS — WEIGHT: 156 LBS | HEART RATE: 73 BPM | BODY MASS INDEX: 31.45 KG/M2 | HEIGHT: 59 IN | RESPIRATION RATE: 18 BRPM

## 2017-08-11 DIAGNOSIS — M25.551 HIP PAIN, RIGHT: Primary | ICD-10-CM

## 2017-08-11 PROCEDURE — 1090F PRES/ABSN URINE INCON ASSESS: CPT | Performed by: ORTHOPAEDIC SURGERY

## 2017-08-11 PROCEDURE — 1036F TOBACCO NON-USER: CPT | Performed by: ORTHOPAEDIC SURGERY

## 2017-08-11 PROCEDURE — 4040F PNEUMOC VAC/ADMIN/RCVD: CPT | Performed by: ORTHOPAEDIC SURGERY

## 2017-08-11 PROCEDURE — 3017F COLORECTAL CA SCREEN DOC REV: CPT | Performed by: ORTHOPAEDIC SURGERY

## 2017-08-11 PROCEDURE — G8427 DOCREV CUR MEDS BY ELIG CLIN: HCPCS | Performed by: ORTHOPAEDIC SURGERY

## 2017-08-11 PROCEDURE — G8417 CALC BMI ABV UP PARAM F/U: HCPCS | Performed by: ORTHOPAEDIC SURGERY

## 2017-08-11 PROCEDURE — 20610 DRAIN/INJ JOINT/BURSA W/O US: CPT | Performed by: ORTHOPAEDIC SURGERY

## 2017-08-11 PROCEDURE — G8598 ASA/ANTIPLAT THER USED: HCPCS | Performed by: ORTHOPAEDIC SURGERY

## 2017-08-11 PROCEDURE — G8400 PT W/DXA NO RESULTS DOC: HCPCS | Performed by: ORTHOPAEDIC SURGERY

## 2017-08-11 PROCEDURE — 1123F ACP DISCUSS/DSCN MKR DOCD: CPT | Performed by: ORTHOPAEDIC SURGERY

## 2017-08-11 PROCEDURE — 99213 OFFICE O/P EST LOW 20 MIN: CPT | Performed by: ORTHOPAEDIC SURGERY

## 2017-08-11 RX ORDER — BUPIVACAINE HYDROCHLORIDE 5 MG/ML
2 INJECTION, SOLUTION PERINEURAL ONCE
Status: COMPLETED | OUTPATIENT
Start: 2017-08-11 | End: 2017-08-11

## 2017-08-11 RX ORDER — LIDOCAINE HYDROCHLORIDE 10 MG/ML
2 INJECTION, SOLUTION EPIDURAL; INFILTRATION; INTRACAUDAL; PERINEURAL ONCE
Status: COMPLETED | OUTPATIENT
Start: 2017-08-11 | End: 2017-08-11

## 2017-08-11 RX ORDER — BETAMETHASONE SODIUM PHOSPHATE AND BETAMETHASONE ACETATE 3; 3 MG/ML; MG/ML
12 INJECTION, SUSPENSION INTRA-ARTICULAR; INTRALESIONAL; INTRAMUSCULAR; SOFT TISSUE ONCE
Status: COMPLETED | OUTPATIENT
Start: 2017-08-11 | End: 2017-08-11

## 2017-08-11 RX ADMIN — LIDOCAINE HYDROCHLORIDE 2 ML: 10 INJECTION, SOLUTION EPIDURAL; INFILTRATION; INTRACAUDAL; PERINEURAL at 10:36

## 2017-08-11 RX ADMIN — BETAMETHASONE SODIUM PHOSPHATE AND BETAMETHASONE ACETATE 12 MG: 3; 3 INJECTION, SUSPENSION INTRA-ARTICULAR; INTRALESIONAL; INTRAMUSCULAR; SOFT TISSUE at 10:36

## 2017-08-11 RX ADMIN — BUPIVACAINE HYDROCHLORIDE 10 MG: 5 INJECTION, SOLUTION PERINEURAL at 10:36

## 2017-09-08 ENCOUNTER — HOSPITAL ENCOUNTER (OUTPATIENT)
Age: 77
Discharge: HOME | End: 2017-09-08
Payer: MEDICARE

## 2017-09-20 ENCOUNTER — OFFICE VISIT (OUTPATIENT)
Dept: ORTHOPEDIC SURGERY | Age: 77
End: 2017-09-20
Payer: MEDICARE

## 2017-09-20 DIAGNOSIS — M25.562 CHRONIC PAIN OF LEFT KNEE: Primary | ICD-10-CM

## 2017-09-20 DIAGNOSIS — G89.29 CHRONIC PAIN OF LEFT KNEE: Primary | ICD-10-CM

## 2017-09-20 PROCEDURE — 1123F ACP DISCUSS/DSCN MKR DOCD: CPT | Performed by: ORTHOPAEDIC SURGERY

## 2017-09-20 PROCEDURE — G8400 PT W/DXA NO RESULTS DOC: HCPCS | Performed by: ORTHOPAEDIC SURGERY

## 2017-09-20 PROCEDURE — 1036F TOBACCO NON-USER: CPT | Performed by: ORTHOPAEDIC SURGERY

## 2017-09-20 PROCEDURE — G8427 DOCREV CUR MEDS BY ELIG CLIN: HCPCS | Performed by: ORTHOPAEDIC SURGERY

## 2017-09-20 PROCEDURE — 4040F PNEUMOC VAC/ADMIN/RCVD: CPT | Performed by: ORTHOPAEDIC SURGERY

## 2017-09-20 PROCEDURE — 99213 OFFICE O/P EST LOW 20 MIN: CPT | Performed by: ORTHOPAEDIC SURGERY

## 2017-09-20 PROCEDURE — G8417 CALC BMI ABV UP PARAM F/U: HCPCS | Performed by: ORTHOPAEDIC SURGERY

## 2017-09-20 PROCEDURE — G8598 ASA/ANTIPLAT THER USED: HCPCS | Performed by: ORTHOPAEDIC SURGERY

## 2017-09-20 PROCEDURE — 1090F PRES/ABSN URINE INCON ASSESS: CPT | Performed by: ORTHOPAEDIC SURGERY

## 2017-09-20 RX ORDER — HYDROCODONE BITARTRATE AND ACETAMINOPHEN 5; 325 MG/1; MG/1
1-2 TABLET ORAL EVERY 6 HOURS PRN
Qty: 40 TABLET | Refills: 0 | Status: SHIPPED | OUTPATIENT
Start: 2017-09-20 | End: 2018-07-05 | Stop reason: ALTCHOICE

## 2017-10-02 PROBLEM — R39.14 FEELING OF INCOMPLETE BLADDER EMPTYING: Status: ACTIVE | Noted: 2017-10-02

## 2017-10-02 PROBLEM — R33.8 OTHER SPECIFIED RETENTION OF URINE: Status: ACTIVE | Noted: 2017-10-02

## 2017-10-12 ENCOUNTER — HOSPITAL ENCOUNTER (OUTPATIENT)
Age: 77
Setting detail: SPECIMEN
Discharge: HOME | End: 2017-10-12
Payer: MEDICARE

## 2017-10-12 DIAGNOSIS — I10 ESSENTIAL HYPERTENSION: ICD-10-CM

## 2017-10-12 DIAGNOSIS — D69.3 CHRONIC ITP (IDIOPATHIC THROMBOCYTOPENIA) (HCC): ICD-10-CM

## 2017-10-12 DIAGNOSIS — E11.42 DIABETIC PERIPHERAL NEUROPATHY (HCC): ICD-10-CM

## 2017-10-12 DIAGNOSIS — D64.9 ANEMIA, UNSPECIFIED TYPE: ICD-10-CM

## 2017-10-12 DIAGNOSIS — E55.9 VITAMIN D DEFICIENCY: ICD-10-CM

## 2017-10-12 DIAGNOSIS — E11.8 TYPE 2 DIABETES MELLITUS WITH COMPLICATION, WITHOUT LONG-TERM CURRENT USE OF INSULIN (HCC): ICD-10-CM

## 2017-10-12 LAB
ANION GAP SERPL CALCULATED.3IONS-SCNC: 14 MMOL/L (ref 9–17)
BUN BLDV-MCNC: 20 MG/DL (ref 8–23)
BUN/CREAT BLD: ABNORMAL (ref 9–20)
CALCIUM SERPL-MCNC: 9.4 MG/DL (ref 8.6–10.4)
CHLORIDE BLD-SCNC: 106 MMOL/L (ref 98–107)
CO2: 22 MMOL/L (ref 20–31)
CREAT SERPL-MCNC: 0.66 MG/DL (ref 0.5–0.9)
ESTIMATED AVERAGE GLUCOSE: 126 MG/DL
GFR AFRICAN AMERICAN: >60 ML/MIN
GFR NON-AFRICAN AMERICAN: >60 ML/MIN
GFR SERPL CREATININE-BSD FRML MDRD: ABNORMAL ML/MIN/{1.73_M2}
GFR SERPL CREATININE-BSD FRML MDRD: ABNORMAL ML/MIN/{1.73_M2}
GLUCOSE BLD-MCNC: 109 MG/DL (ref 70–99)
HBA1C MFR BLD: 6 % (ref 4–6)
HCT VFR BLD CALC: 34.9 % (ref 36–46)
HEMOGLOBIN: 11 G/DL (ref 12–16)
MCH RBC QN AUTO: 24.5 PG (ref 26–34)
MCHC RBC AUTO-ENTMCNC: 31.6 G/DL (ref 31–37)
MCV RBC AUTO: 77.6 FL (ref 80–100)
PDW BLD-RTO: 20 % (ref 12.5–15.4)
PLATELET # BLD: 115 K/UL (ref 140–450)
PMV BLD AUTO: ABNORMAL FL (ref 6–12)
POTASSIUM SERPL-SCNC: 4.2 MMOL/L (ref 3.7–5.3)
RBC # BLD: 4.5 M/UL (ref 4–5.2)
SODIUM BLD-SCNC: 142 MMOL/L (ref 135–144)
VITAMIN D 25-HYDROXY: 44.4 NG/ML (ref 30–100)
WBC # BLD: 7.1 K/UL (ref 3.5–11)

## 2017-11-07 ENCOUNTER — TELEPHONE (OUTPATIENT)
Dept: GASTROENTEROLOGY | Age: 77
End: 2017-11-07

## 2017-11-07 ENCOUNTER — HOSPITAL ENCOUNTER (OUTPATIENT)
Age: 77
Setting detail: OUTPATIENT SURGERY
Discharge: HOME | End: 2017-11-07
Attending: OPHTHALMOLOGY | Admitting: OPHTHALMOLOGY
Payer: MEDICARE

## 2017-11-07 VITALS
HEART RATE: 72 BPM | SYSTOLIC BLOOD PRESSURE: 147 MMHG | WEIGHT: 154 LBS | DIASTOLIC BLOOD PRESSURE: 75 MMHG | RESPIRATION RATE: 16 BRPM | HEIGHT: 60 IN | TEMPERATURE: 97.5 F | BODY MASS INDEX: 30.23 KG/M2 | OXYGEN SATURATION: 98 %

## 2017-11-07 PROBLEM — H25.12 AGE-RELATED NUCLEAR CATARACT, LEFT: Status: RESOLVED | Noted: 2017-11-07 | Resolved: 2017-11-07

## 2017-11-07 LAB — GLUCOSE BLD-MCNC: 76 MG/DL (ref 65–105)

## 2017-11-07 PROCEDURE — 3600000013 HC SURGERY LEVEL 3 ADDTL 15MIN: Performed by: OPHTHALMOLOGY

## 2017-11-07 PROCEDURE — V2632 POST CHMBR INTRAOCULAR LENS: HCPCS | Performed by: OPHTHALMOLOGY

## 2017-11-07 PROCEDURE — 2580000003 HC RX 258: Performed by: OPHTHALMOLOGY

## 2017-11-07 PROCEDURE — A6402 STERILE GAUZE <= 16 SQ IN: HCPCS | Performed by: OPHTHALMOLOGY

## 2017-11-07 PROCEDURE — 6370000000 HC RX 637 (ALT 250 FOR IP): Performed by: OPHTHALMOLOGY

## 2017-11-07 PROCEDURE — 2500000003 HC RX 250 WO HCPCS: Performed by: OPHTHALMOLOGY

## 2017-11-07 PROCEDURE — 7100000011 HC PHASE II RECOVERY - ADDTL 15 MIN: Performed by: OPHTHALMOLOGY

## 2017-11-07 PROCEDURE — 6360000002 HC RX W HCPCS: Performed by: OPHTHALMOLOGY

## 2017-11-07 PROCEDURE — 3600000003 HC SURGERY LEVEL 3 BASE: Performed by: OPHTHALMOLOGY

## 2017-11-07 PROCEDURE — 7100000010 HC PHASE II RECOVERY - FIRST 15 MIN: Performed by: OPHTHALMOLOGY

## 2017-11-07 PROCEDURE — 82947 ASSAY GLUCOSE BLOOD QUANT: CPT

## 2017-11-07 DEVICE — LENS IOL SN60WF 23.0D: Type: IMPLANTABLE DEVICE | Site: EYE | Status: FUNCTIONAL

## 2017-11-07 RX ORDER — BALANCED SALT SOLUTION ENRICHED WITH BICARBONATE, DEXTROSE, AND GLUTATHIONE
KIT INTRAOCULAR PRN
Status: DISCONTINUED | OUTPATIENT
Start: 2017-11-07 | End: 2017-11-07 | Stop reason: HOSPADM

## 2017-11-07 RX ORDER — TOBRAMYCIN 3 MG/ML
1 SOLUTION/ DROPS OPHTHALMIC EVERY 5 MIN PRN
Status: DISCONTINUED | OUTPATIENT
Start: 2017-11-07 | End: 2017-11-07 | Stop reason: HOSPADM

## 2017-11-07 RX ORDER — TIMOLOL MALEATE 5 MG/ML
SOLUTION/ DROPS OPHTHALMIC PRN
Status: DISCONTINUED | OUTPATIENT
Start: 2017-11-07 | End: 2017-11-07 | Stop reason: HOSPADM

## 2017-11-07 RX ORDER — LIDOCAINE HYDROCHLORIDE 10 MG/ML
INJECTION, SOLUTION INFILTRATION; PERINEURAL PRN
Status: DISCONTINUED | OUTPATIENT
Start: 2017-11-07 | End: 2017-11-07 | Stop reason: HOSPADM

## 2017-11-07 RX ORDER — KETOROLAC TROMETHAMINE 5 MG/ML
1 SOLUTION OPHTHALMIC EVERY 5 MIN PRN
Status: COMPLETED | OUTPATIENT
Start: 2017-11-07 | End: 2017-11-07

## 2017-11-07 RX ORDER — PHENYLEPHRINE HCL 2.5 %
1 DROPS OPHTHALMIC (EYE) EVERY 5 MIN PRN
Status: COMPLETED | OUTPATIENT
Start: 2017-11-07 | End: 2017-11-07

## 2017-11-07 RX ORDER — CYCLOPENTOLATE HYDROCHLORIDE 10 MG/ML
1 SOLUTION/ DROPS OPHTHALMIC EVERY 5 MIN PRN
Status: COMPLETED | OUTPATIENT
Start: 2017-11-07 | End: 2017-11-07

## 2017-11-07 RX ORDER — LORAZEPAM 1 MG/1
1 TABLET ORAL ONCE
Status: COMPLETED | OUTPATIENT
Start: 2017-11-07 | End: 2017-11-07

## 2017-11-07 RX ORDER — SODIUM CHLORIDE, SODIUM LACTATE, POTASSIUM CHLORIDE, CALCIUM CHLORIDE 600; 310; 30; 20 MG/100ML; MG/100ML; MG/100ML; MG/100ML
INJECTION, SOLUTION INTRAVENOUS CONTINUOUS
Status: DISCONTINUED | OUTPATIENT
Start: 2017-11-07 | End: 2017-11-07 | Stop reason: HOSPADM

## 2017-11-07 RX ORDER — NEOMYCIN SULFATE, POLYMYXIN B SULFATE, AND DEXAMETHASONE 3.5; 10000; 1 MG/G; [USP'U]/G; MG/G
OINTMENT OPHTHALMIC PRN
Status: DISCONTINUED | OUTPATIENT
Start: 2017-11-07 | End: 2017-11-07 | Stop reason: HOSPADM

## 2017-11-07 RX ORDER — BUPIVACAINE HYDROCHLORIDE 5 MG/ML
1 INJECTION, SOLUTION EPIDURAL; INTRACAUDAL SEE ADMIN INSTRUCTIONS
Status: DISCONTINUED | OUTPATIENT
Start: 2017-11-07 | End: 2017-11-07 | Stop reason: HOSPADM

## 2017-11-07 RX ADMIN — KETOROLAC TROMETHAMINE 1 DROP: 5 SOLUTION/ DROPS OPHTHALMIC at 07:10

## 2017-11-07 RX ADMIN — TOBRAMYCIN 1 DROP: 3 SOLUTION OPHTHALMIC at 07:33

## 2017-11-07 RX ADMIN — BUPIVACAINE HYDROCHLORIDE 1 DROP: 5 INJECTION, SOLUTION EPIDURAL; INTRACAUDAL at 07:32

## 2017-11-07 RX ADMIN — BUPIVACAINE HYDROCHLORIDE 1 DROP: 5 INJECTION, SOLUTION EPIDURAL; INTRACAUDAL at 07:10

## 2017-11-07 RX ADMIN — PHENYLEPHRINE HYDROCHLORIDE 1 DROP: 25 SOLUTION/ DROPS OPHTHALMIC at 07:32

## 2017-11-07 RX ADMIN — CYCLOPENTOLATE HYDROCHLORIDE 1 DROP: 10 SOLUTION/ DROPS OPHTHALMIC at 07:10

## 2017-11-07 RX ADMIN — KETOROLAC TROMETHAMINE 1 DROP: 5 SOLUTION/ DROPS OPHTHALMIC at 07:32

## 2017-11-07 RX ADMIN — TOBRAMYCIN 1 DROP: 3 SOLUTION OPHTHALMIC at 07:11

## 2017-11-07 RX ADMIN — SODIUM CHLORIDE, POTASSIUM CHLORIDE, SODIUM LACTATE AND CALCIUM CHLORIDE: 600; 310; 30; 20 INJECTION, SOLUTION INTRAVENOUS at 07:32

## 2017-11-07 RX ADMIN — CYCLOPENTOLATE HYDROCHLORIDE 1 DROP: 10 SOLUTION/ DROPS OPHTHALMIC at 07:32

## 2017-11-07 RX ADMIN — PHENYLEPHRINE HYDROCHLORIDE 1 DROP: 25 SOLUTION/ DROPS OPHTHALMIC at 07:10

## 2017-11-07 RX ADMIN — LORAZEPAM 1 MG: 1 TABLET ORAL at 07:06

## 2017-11-07 ASSESSMENT — PAIN SCALES - GENERAL
PAINLEVEL_OUTOF10: 0

## 2017-11-07 ASSESSMENT — PAIN - FUNCTIONAL ASSESSMENT: PAIN_FUNCTIONAL_ASSESSMENT: 0-10

## 2017-11-07 NOTE — OP NOTE
Pankaj Chau [id number] 69 y.o. OPERATIVE NOTE    Preoperative Diagnosis: Cataract the left eye    Postoperative Diagnosis: Cataract the left eye     Procedure: Phacoemulsification with intraocular lens implantation, the left eye    Surgeon: Lana Corrigan MD    Anesthesia: Topical  With monitored sedation      Complications: none    Specimens: none    Indications for procedure: The patient is a 69y.o. year old with decreased vision, glare and halos around lights, and trouble with activities of daily living. Examination revealed a visually significant cataract in the the left eye. Risks, benefits, and alternatives to surgery were discussed with the patient and the patient elected to proceed with phacoemulsification with lens implantation. Details of the procedure: Following informed consent, the patient was taken to the operating room and placed in the supine position. The eye was prepped and draped in the usual sterile fashion using aseptic technique for cataract surgery and a lid speculum was placed between the lids. Several drops of topical .5% Marcaine were place on the eye. A crescent blade was use to make a 2 mm tunnel at the limbus into clear cornea and a 2.75 mm keratome blade was used to enter the eye at the 9 o'clock postion. A side port incision was made in the    2 o'clock position. A small amount of 1% non-preserved lidocaine was placed in the eye. The eye was filled with viscoelastic and a continuous tear capsulorhexis was performed. The lens was hydrodissected and hydrodilineated with balanced salt solution. Phacoemulsification was performed in a divide and conquer technique. Irrigation/aspiration was used to remove all cortical material from the capsular bag. The eye was filled with viscoelastic and a foldable posterior chamber intraocular lens was injected into the capsular bag and rotated into position model SN60WF 23.0 diopter power.   Irrigation/aspiration was used to remove all excess viscoelastic. The eye was pressurized and the wounds were checked for leaks and none were found. The patient had antibiotic, steroid, timoptic and antibiotic/steroid ointment placed in the eye. The lid speculum was removed. The eye was covered with a shield. The patient went to the PACU in excellent condition, having tolerated the procedure extremely well and will follow up with me tomorrow for postop day 1 care. Post-op instructions were discussed with patient and family.      Katt Us MD

## 2017-11-22 RX ORDER — TOBRAMYCIN 3 MG/ML
1 SOLUTION/ DROPS OPHTHALMIC EVERY 5 MIN PRN
Status: CANCELLED | OUTPATIENT
Start: 2017-11-22

## 2017-11-22 RX ORDER — CYCLOPENTOLATE HYDROCHLORIDE 10 MG/ML
1 SOLUTION/ DROPS OPHTHALMIC EVERY 5 MIN PRN
Status: CANCELLED | OUTPATIENT
Start: 2017-11-22

## 2017-11-22 RX ORDER — LORAZEPAM 1 MG/1
1 TABLET ORAL ONCE
Status: CANCELLED | OUTPATIENT
Start: 2017-11-22 | End: 2017-11-22

## 2017-11-22 RX ORDER — KETOROLAC TROMETHAMINE 5 MG/ML
1 SOLUTION OPHTHALMIC EVERY 5 MIN PRN
Status: CANCELLED | OUTPATIENT
Start: 2017-11-22

## 2017-11-22 RX ORDER — SODIUM CHLORIDE, SODIUM LACTATE, POTASSIUM CHLORIDE, CALCIUM CHLORIDE 600; 310; 30; 20 MG/100ML; MG/100ML; MG/100ML; MG/100ML
INJECTION, SOLUTION INTRAVENOUS CONTINUOUS
Status: CANCELLED | OUTPATIENT
Start: 2017-11-22

## 2017-11-22 RX ORDER — PHENYLEPHRINE HCL 2.5 %
1 DROPS OPHTHALMIC (EYE) EVERY 5 MIN PRN
Status: CANCELLED | OUTPATIENT
Start: 2017-11-22

## 2017-11-22 RX ORDER — BUPIVACAINE HYDROCHLORIDE 5 MG/ML
1 INJECTION, SOLUTION EPIDURAL; INTRACAUDAL SEE ADMIN INSTRUCTIONS
Status: CANCELLED | OUTPATIENT
Start: 2017-11-22

## 2017-11-28 ENCOUNTER — HOSPITAL ENCOUNTER (OUTPATIENT)
Age: 77
Setting detail: OUTPATIENT SURGERY
Discharge: HOME | End: 2017-11-28
Attending: OPHTHALMOLOGY | Admitting: OPHTHALMOLOGY
Payer: MEDICARE

## 2017-11-28 VITALS
WEIGHT: 148 LBS | BODY MASS INDEX: 29.84 KG/M2 | SYSTOLIC BLOOD PRESSURE: 129 MMHG | HEART RATE: 60 BPM | OXYGEN SATURATION: 96 % | TEMPERATURE: 97.5 F | DIASTOLIC BLOOD PRESSURE: 79 MMHG | RESPIRATION RATE: 16 BRPM | HEIGHT: 59 IN

## 2017-11-28 PROBLEM — H25.11 AGE-RELATED NUCLEAR CATARACT, RIGHT: Status: RESOLVED | Noted: 2017-11-28 | Resolved: 2017-11-28

## 2017-11-28 PROCEDURE — 3600000013 HC SURGERY LEVEL 3 ADDTL 15MIN: Performed by: OPHTHALMOLOGY

## 2017-11-28 PROCEDURE — V2632 POST CHMBR INTRAOCULAR LENS: HCPCS | Performed by: OPHTHALMOLOGY

## 2017-11-28 PROCEDURE — 7100000011 HC PHASE II RECOVERY - ADDTL 15 MIN: Performed by: OPHTHALMOLOGY

## 2017-11-28 PROCEDURE — 6370000000 HC RX 637 (ALT 250 FOR IP): Performed by: OPHTHALMOLOGY

## 2017-11-28 PROCEDURE — 7100000010 HC PHASE II RECOVERY - FIRST 15 MIN: Performed by: OPHTHALMOLOGY

## 2017-11-28 PROCEDURE — 6360000002 HC RX W HCPCS: Performed by: OPHTHALMOLOGY

## 2017-11-28 PROCEDURE — 3600000003 HC SURGERY LEVEL 3 BASE: Performed by: OPHTHALMOLOGY

## 2017-11-28 PROCEDURE — 2500000003 HC RX 250 WO HCPCS: Performed by: OPHTHALMOLOGY

## 2017-11-28 PROCEDURE — A6402 STERILE GAUZE <= 16 SQ IN: HCPCS | Performed by: OPHTHALMOLOGY

## 2017-11-28 PROCEDURE — 2580000003 HC RX 258: Performed by: OPHTHALMOLOGY

## 2017-11-28 PROCEDURE — 99152 MOD SED SAME PHYS/QHP 5/>YRS: CPT | Performed by: OPHTHALMOLOGY

## 2017-11-28 DEVICE — LENS INTOCU +22.5 DIOPT L13MM DIA6MM 0DEG HAPTIC ANG A: Type: IMPLANTABLE DEVICE | Site: EYE | Status: FUNCTIONAL

## 2017-11-28 RX ORDER — TOBRAMYCIN 3 MG/ML
1 SOLUTION/ DROPS OPHTHALMIC EVERY 5 MIN PRN
Status: DISCONTINUED | OUTPATIENT
Start: 2017-11-28 | End: 2017-11-28 | Stop reason: HOSPADM

## 2017-11-28 RX ORDER — BALANCED SALT SOLUTION ENRICHED WITH BICARBONATE, DEXTROSE, AND GLUTATHIONE
KIT INTRAOCULAR PRN
Status: DISCONTINUED | OUTPATIENT
Start: 2017-11-28 | End: 2017-11-28 | Stop reason: HOSPADM

## 2017-11-28 RX ORDER — TIMOLOL MALEATE 5 MG/ML
SOLUTION/ DROPS OPHTHALMIC PRN
Status: DISCONTINUED | OUTPATIENT
Start: 2017-11-28 | End: 2017-11-28 | Stop reason: HOSPADM

## 2017-11-28 RX ORDER — CYCLOPENTOLATE HYDROCHLORIDE 10 MG/ML
1 SOLUTION/ DROPS OPHTHALMIC EVERY 5 MIN PRN
Status: COMPLETED | OUTPATIENT
Start: 2017-11-28 | End: 2017-11-28

## 2017-11-28 RX ORDER — MIDAZOLAM HYDROCHLORIDE 1 MG/ML
INJECTION INTRAMUSCULAR; INTRAVENOUS PRN
Status: DISCONTINUED | OUTPATIENT
Start: 2017-11-28 | End: 2017-11-28 | Stop reason: HOSPADM

## 2017-11-28 RX ORDER — PHENYLEPHRINE HCL 2.5 %
1 DROPS OPHTHALMIC (EYE) EVERY 5 MIN PRN
Status: COMPLETED | OUTPATIENT
Start: 2017-11-28 | End: 2017-11-28

## 2017-11-28 RX ORDER — SODIUM CHLORIDE, SODIUM LACTATE, POTASSIUM CHLORIDE, CALCIUM CHLORIDE 600; 310; 30; 20 MG/100ML; MG/100ML; MG/100ML; MG/100ML
INJECTION, SOLUTION INTRAVENOUS CONTINUOUS
Status: DISCONTINUED | OUTPATIENT
Start: 2017-11-28 | End: 2017-11-28 | Stop reason: HOSPADM

## 2017-11-28 RX ORDER — LORAZEPAM 1 MG/1
1 TABLET ORAL ONCE
Status: COMPLETED | OUTPATIENT
Start: 2017-11-28 | End: 2017-11-28

## 2017-11-28 RX ORDER — BUPIVACAINE HYDROCHLORIDE 5 MG/ML
1 INJECTION, SOLUTION EPIDURAL; INTRACAUDAL SEE ADMIN INSTRUCTIONS
Status: DISCONTINUED | OUTPATIENT
Start: 2017-11-28 | End: 2017-11-28 | Stop reason: HOSPADM

## 2017-11-28 RX ORDER — BUPIVACAINE HYDROCHLORIDE 5 MG/ML
INJECTION, SOLUTION EPIDURAL; INTRACAUDAL PRN
Status: DISCONTINUED | OUTPATIENT
Start: 2017-11-28 | End: 2017-11-28 | Stop reason: HOSPADM

## 2017-11-28 RX ORDER — LIDOCAINE HYDROCHLORIDE 10 MG/ML
INJECTION, SOLUTION INFILTRATION; PERINEURAL PRN
Status: DISCONTINUED | OUTPATIENT
Start: 2017-11-28 | End: 2017-11-28 | Stop reason: HOSPADM

## 2017-11-28 RX ORDER — KETOROLAC TROMETHAMINE 5 MG/ML
1 SOLUTION OPHTHALMIC EVERY 5 MIN PRN
Status: COMPLETED | OUTPATIENT
Start: 2017-11-28 | End: 2017-11-28

## 2017-11-28 RX ADMIN — LORAZEPAM 1 MG: 1 TABLET ORAL at 07:54

## 2017-11-28 RX ADMIN — CYCLOPENTOLATE HYDROCHLORIDE 1 DROP: 10 SOLUTION/ DROPS OPHTHALMIC at 08:20

## 2017-11-28 RX ADMIN — PHENYLEPHRINE HYDROCHLORIDE 1 DROP: 25 SOLUTION/ DROPS OPHTHALMIC at 08:19

## 2017-11-28 RX ADMIN — BUPIVACAINE HYDROCHLORIDE 5 MG: 5 INJECTION, SOLUTION EPIDURAL; INTRACAUDAL; PERINEURAL at 08:01

## 2017-11-28 RX ADMIN — TOBRAMYCIN 1 DROP: 3 SOLUTION OPHTHALMIC at 08:01

## 2017-11-28 RX ADMIN — SODIUM CHLORIDE, POTASSIUM CHLORIDE, SODIUM LACTATE AND CALCIUM CHLORIDE: 600; 310; 30; 20 INJECTION, SOLUTION INTRAVENOUS at 08:16

## 2017-11-28 RX ADMIN — PHENYLEPHRINE HYDROCHLORIDE 1 DROP: 25 SOLUTION/ DROPS OPHTHALMIC at 08:00

## 2017-11-28 RX ADMIN — KETOROLAC TROMETHAMINE 1 DROP: 5 SOLUTION/ DROPS OPHTHALMIC at 08:20

## 2017-11-28 RX ADMIN — KETOROLAC TROMETHAMINE 1 DROP: 5 SOLUTION/ DROPS OPHTHALMIC at 08:01

## 2017-11-28 RX ADMIN — TOBRAMYCIN 1 DROP: 3 SOLUTION OPHTHALMIC at 08:20

## 2017-11-28 RX ADMIN — CYCLOPENTOLATE HYDROCHLORIDE 1 DROP: 10 SOLUTION/ DROPS OPHTHALMIC at 08:00

## 2017-11-28 ASSESSMENT — PAIN SCALES - GENERAL
PAINLEVEL_OUTOF10: 0
PAINLEVEL_OUTOF10: 0

## 2017-11-28 ASSESSMENT — PAIN - FUNCTIONAL ASSESSMENT: PAIN_FUNCTIONAL_ASSESSMENT: 0-10

## 2017-11-28 NOTE — OP NOTE
[facility] \[facility] [place] [facility]\"" [id number] 69 y.o. OPERATIVE NOTE    Preoperative Diagnosis: Cataract the right eye    Postoperative Diagnosis: Cataract the right eye     Procedure: Phacoemulsification with intraocular lens implantation, the right eye    Surgeon: Alaina Smith MD    Anesthesia: Topical  With monitored sedation      Complications: none    Specimens: none    Indications for procedure: The patient is a 69y.o. year old with decreased vision, glare and halos around lights, and trouble with activities of daily living. Examination revealed a visually significant cataract in the the right eye. Risks, benefits, and alternatives to surgery were discussed with the patient and the patient elected to proceed with phacoemulsification with lens implantation. Details of the procedure: Following informed consent, the patient was taken to the operating room and placed in the supine position. The eye was prepped and draped in the usual sterile fashion using aseptic technique for cataract surgery and a lid speculum was placed between the lids. Several drops of topical .5% Marcaine were place on the eye. A crescent blade was use to make a 2 mm tunnel at the limbus into clear cornea and a 2.75 mm keratome blade was used to enter the eye at the 9 o'clock postion. A side port incision was made in the    2 o'clock position. A small amount of 1% non-preserved lidocaine was placed in the eye. The eye was filled with viscoelastic and a continuous tear capsulorhexis was performed. The lens was hydrodissected and hydrodilineated with balanced salt solution. Phacoemulsification was performed in a divide and conquer technique. Irrigation/aspiration was used to remove all cortical material from the capsular bag. The eye was filled with viscoelastic and a foldable posterior chamber intraocular lens was injected into the capsular bag and rotated into position model SN60WF 22.5 diopter power.   Irrigation/aspiration was used to remove all excess viscoelastic. The eye was pressurized and the wounds were checked for leaks and none were found. The patient had antibiotic, steroid, timoptic and antibiotic/steroid ointment placed in the eye. The lid speculum was removed. The eye was covered with a shield. The patient went to the PACU in excellent condition, having tolerated the procedure extremely well and will follow up with me tomorrow for postop day 1 care. Post-op instructions were discussed with patient and family.      Libby Ellison MD

## 2017-11-28 NOTE — H&P
Haze Sandhoff is a 69y.o. year old who presents for elective outpatient ophthalmic surgery with Kourtney Darrynck A [facility]. The patient complains of decreased vision, glare and halos around lights, and trouble with vision for activities of daily living. Past Medical History:   Diagnosis Date    Age-related cognitive decline     Ankle pain     Left ankle fracture in ortho boat.    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    left with urinary incontinenc post surgical    Caffeine use     2 coffee/day, 1-2 tea per week    Chronic back pain     Chronic ITP (idiopathic thrombocytopenia) (HCC)     CVA (cerebral infarction) 2008, 2010    balance issues, short term memory loss    Diabetic gastroparesis ([address]     Diverticular disease 1986    GERD (gastroesophageal reflux disease)     Hiatal hernia     Hip fracture ([address]     History of decreased platelet count     Hyperlipemia     Hypertension     Internal hemorrhoid     Macular degeneration of left eye 1980's    S/P laser treatment    Nausea and vomiting     Neuropathy (HCC)     Osteoarthritis     Ringing in ears     Self-catheterizes urinary bladder     TIA (transient ischemic attack) 2000    x1    Tubular adenoma     colon polyps    Type II or unspecified type diabetes mellitus without mention of complication, not stated as uncontrolled     Urinary incontinence     frequent UTI s/p infection, surgical dilatation lead to incontinence    Wears dentures     full on top, partial on bottom    Wears glasses        Past Surgical History:   Procedure Laterality Date    APPENDECTOMY  1962    BLADDER SURGERY      bladder stimulator    BLADDER SUSPENSION  2002    multiple    CARDIAC CATHETERIZATION  2008    no stents    CARDIOVASCULAR STRESS TEST  12/2014    CATARACT REMOVAL WITH IMPLANT Left 11/07/2017    [facility]    COLON SURGERY      colostory reversal    COLONOSCOPY

## 2017-12-08 ENCOUNTER — OFFICE VISIT (OUTPATIENT)
Dept: GASTROENTEROLOGY | Age: 77
End: 2017-12-08
Payer: MEDICARE

## 2017-12-08 VITALS
DIASTOLIC BLOOD PRESSURE: 87 MMHG | HEIGHT: 59 IN | WEIGHT: 150 LBS | OXYGEN SATURATION: 100 % | SYSTOLIC BLOOD PRESSURE: 139 MMHG | RESPIRATION RATE: 14 BRPM | HEART RATE: 85 BPM | BODY MASS INDEX: 30.24 KG/M2

## 2017-12-08 DIAGNOSIS — F43.9 STRESS: ICD-10-CM

## 2017-12-08 DIAGNOSIS — R05.9 COUGH: ICD-10-CM

## 2017-12-08 DIAGNOSIS — K21.9 GASTROESOPHAGEAL REFLUX DISEASE WITHOUT ESOPHAGITIS: Primary | ICD-10-CM

## 2017-12-08 PROCEDURE — G8484 FLU IMMUNIZE NO ADMIN: HCPCS | Performed by: INTERNAL MEDICINE

## 2017-12-08 PROCEDURE — 1123F ACP DISCUSS/DSCN MKR DOCD: CPT | Performed by: INTERNAL MEDICINE

## 2017-12-08 PROCEDURE — 1036F TOBACCO NON-USER: CPT | Performed by: INTERNAL MEDICINE

## 2017-12-08 PROCEDURE — G8427 DOCREV CUR MEDS BY ELIG CLIN: HCPCS | Performed by: INTERNAL MEDICINE

## 2017-12-08 PROCEDURE — 1090F PRES/ABSN URINE INCON ASSESS: CPT | Performed by: INTERNAL MEDICINE

## 2017-12-08 PROCEDURE — 99214 OFFICE O/P EST MOD 30 MIN: CPT | Performed by: INTERNAL MEDICINE

## 2017-12-08 PROCEDURE — G8598 ASA/ANTIPLAT THER USED: HCPCS | Performed by: INTERNAL MEDICINE

## 2017-12-08 PROCEDURE — G8417 CALC BMI ABV UP PARAM F/U: HCPCS | Performed by: INTERNAL MEDICINE

## 2017-12-08 PROCEDURE — 4040F PNEUMOC VAC/ADMIN/RCVD: CPT | Performed by: INTERNAL MEDICINE

## 2017-12-08 PROCEDURE — G8400 PT W/DXA NO RESULTS DOC: HCPCS | Performed by: INTERNAL MEDICINE

## 2017-12-08 ASSESSMENT — ENCOUNTER SYMPTOMS
VOMITING: 1
ABDOMINAL DISTENTION: 0
BLOOD IN STOOL: 0
DIARRHEA: 0
SINUS PRESSURE: 1
ABDOMINAL PAIN: 0
NAUSEA: 1
SHORTNESS OF BREATH: 0
RECTAL PAIN: 0
ANAL BLEEDING: 0
BACK PAIN: 1
COUGH: 1
CONSTIPATION: 1
ALLERGIC/IMMUNOLOGIC NEGATIVE: 1

## 2017-12-08 NOTE — PROGRESS NOTES
Subjective:      Patient ID: Vito Hamman is a 69 y.o. female. HPI   Dr. Lyudmila Garcia MD our mutual patient Vito Hamman was seen  for   1. Gastroesophageal reflux disease without esophagitis    2. Cough    3. Stress     . Here for f/u   She has been on PPI and Carafate  Has hx of Hiatal hernia  Previously Carafate was helping but is irritating her throat now  She has been under some stress from the sickness of her   She has nausea and vomiting off and on   She has hx of hiatal hernia  No dysphagia  She has no bleeding  Has no melena    Past Medical, Family, and Social History reviewed and does contribute to the patient presenting condition. patient\"s PMH/PSH,SH,PSYCH hx, MEDs, ALLERGIES, and ROS was all reviewed and updated ion the appropriate sections      Review of Systems   Constitutional: Negative. Negative for fatigue. HENT: Positive for postnasal drip and sinus pressure. Eyes: Positive for visual disturbance. Wears glasses   Respiratory: Positive for cough. Negative for shortness of breath. Cardiovascular: Negative. Negative for leg swelling. Gastrointestinal: Positive for constipation, nausea and vomiting. Negative for abdominal distention, abdominal pain, anal bleeding, blood in stool, diarrhea and rectal pain. Worsening GERD   Endocrine: Negative. Genitourinary: Negative. Musculoskeletal: Positive for arthralgias and back pain. Skin: Negative. Allergic/Immunologic: Negative. Neurological: Positive for light-headedness. Negative for dizziness and weakness. Hematological: Bruises/bleeds easily. Psychiatric/Behavioral: Positive for sleep disturbance. The patient is nervous/anxious. Reviewed and agree  Objective:   Physical Exam   Constitutional: She is oriented to person, place, and time. She appears well-developed and well-nourished. Anxious    HENT:   Head: Normocephalic and atraumatic.    Mouth/Throat: Oropharynx is clear and moist.   Eyes: These are available over the counter at the Pharmacy stores and [address] [employer]  He was explained about the beneficial effects they have in the GI track  They will help to establish the good bacterial jaguar and will help with the digestion and bowel movements  The patient has verbalized understanding and agreement to this plan

## 2018-01-23 ENCOUNTER — HOSPITAL ENCOUNTER (OUTPATIENT)
Dept: VASCULAR LAB | Age: 78
Discharge: HOME | End: 2018-01-23
Payer: MEDICARE

## 2018-01-23 PROCEDURE — 93880 EXTRACRANIAL BILAT STUDY: CPT

## 2018-03-26 ENCOUNTER — HOSPITAL ENCOUNTER (OUTPATIENT)
Age: 78
Setting detail: SPECIMEN
Discharge: HOME | End: 2018-03-26
Payer: MEDICARE

## 2018-03-26 DIAGNOSIS — D69.3 CHRONIC ITP (IDIOPATHIC THROMBOCYTOPENIA) (HCC): ICD-10-CM

## 2018-03-26 DIAGNOSIS — E55.9 VITAMIN D DEFICIENCY: ICD-10-CM

## 2018-03-26 DIAGNOSIS — D64.9 ANEMIA, UNSPECIFIED TYPE: ICD-10-CM

## 2018-03-26 DIAGNOSIS — R41.81 AGE-RELATED COGNITIVE DECLINE: ICD-10-CM

## 2018-03-26 DIAGNOSIS — I10 ESSENTIAL HYPERTENSION: ICD-10-CM

## 2018-03-26 DIAGNOSIS — E53.8 B12 DEFICIENCY: ICD-10-CM

## 2018-03-26 DIAGNOSIS — E78.00 PURE HYPERCHOLESTEROLEMIA: ICD-10-CM

## 2018-03-26 DIAGNOSIS — E11.42 DIABETIC PERIPHERAL NEUROPATHY (HCC): ICD-10-CM

## 2018-03-26 DIAGNOSIS — E11.8 TYPE 2 DIABETES MELLITUS WITH COMPLICATION, WITHOUT LONG-TERM CURRENT USE OF INSULIN (HCC): ICD-10-CM

## 2018-03-26 LAB
ALBUMIN SERPL-MCNC: 4.3 G/DL (ref 3.5–5.2)
ALBUMIN/GLOBULIN RATIO: 2 (ref 1–2.5)
ALP BLD-CCNC: 55 U/L (ref 35–104)
ALT SERPL-CCNC: 13 U/L (ref 5–33)
ANION GAP SERPL CALCULATED.3IONS-SCNC: 19 MMOL/L (ref 9–17)
AST SERPL-CCNC: 18 U/L
BILIRUB SERPL-MCNC: 0.36 MG/DL (ref 0.3–1.2)
BUN BLDV-MCNC: 20 MG/DL (ref 8–23)
BUN/CREAT BLD: ABNORMAL (ref 9–20)
CALCIUM SERPL-MCNC: 9.7 MG/DL (ref 8.6–10.4)
CHLORIDE BLD-SCNC: 106 MMOL/L (ref 98–107)
CHOLESTEROL/HDL RATIO: 2.2
CHOLESTEROL: 116 MG/DL
CO2: 22 MMOL/L (ref 20–31)
CREAT SERPL-MCNC: 0.72 MG/DL (ref 0.5–0.9)
GFR AFRICAN AMERICAN: >60 ML/MIN
GFR NON-AFRICAN AMERICAN: >60 ML/MIN
GFR SERPL CREATININE-BSD FRML MDRD: ABNORMAL ML/MIN/{1.73_M2}
GFR SERPL CREATININE-BSD FRML MDRD: ABNORMAL ML/MIN/{1.73_M2}
GLUCOSE BLD-MCNC: 114 MG/DL (ref 70–99)
HCT VFR BLD CALC: 37.6 % (ref 36.3–47.1)
HDLC SERPL-MCNC: 53 MG/DL
HEMOGLOBIN: 10.7 G/DL (ref 11.9–15.1)
LDL CHOLESTEROL: 44 MG/DL (ref 0–130)
MCH RBC QN AUTO: 24.8 PG (ref 25.2–33.5)
MCHC RBC AUTO-ENTMCNC: 28.5 G/DL (ref 28.4–34.8)
MCV RBC AUTO: 87.2 FL (ref 82.6–102.9)
NRBC AUTOMATED: 0 PER 100 WBC
PDW BLD-RTO: 16.6 % (ref 11.8–14.4)
PLATELET # BLD: ABNORMAL K/UL (ref 138–453)
PLATELET, FLUORESCENCE: 122 K/UL (ref 138–453)
PLATELET, IMMATURE FRACTION: 23.6 % (ref 1.1–10.3)
PMV BLD AUTO: ABNORMAL FL (ref 8.1–13.5)
POTASSIUM SERPL-SCNC: 4.8 MMOL/L (ref 3.7–5.3)
RBC # BLD: 4.31 M/UL (ref 3.95–5.11)
SODIUM BLD-SCNC: 147 MMOL/L (ref 135–144)
TOTAL PROTEIN: 6.4 G/DL (ref 6.4–8.3)
TRIGL SERPL-MCNC: 94 MG/DL
VITAMIN B-12: 241 PG/ML (ref 232–1245)
VITAMIN D 25-HYDROXY: 21 NG/ML (ref 30–100)
VLDLC SERPL CALC-MCNC: NORMAL MG/DL (ref 1–30)
WBC # BLD: 5.8 K/UL (ref 3.5–11.3)

## 2018-04-02 PROBLEM — R05.9 COUGH: Status: RESOLVED | Noted: 2017-12-08 | Resolved: 2018-04-02

## 2018-04-02 PROBLEM — R39.14 FEELING OF INCOMPLETE BLADDER EMPTYING: Status: RESOLVED | Noted: 2017-10-02 | Resolved: 2018-04-02

## 2018-04-02 PROBLEM — R33.8 OTHER SPECIFIED RETENTION OF URINE: Status: RESOLVED | Noted: 2017-10-02 | Resolved: 2018-04-02

## 2018-04-18 RX ORDER — SUCRALFATE 1 G/10ML
SUSPENSION ORAL
Qty: 1200 ML | Refills: 2 | OUTPATIENT
Start: 2018-04-18

## 2018-06-08 ENCOUNTER — OFFICE VISIT (OUTPATIENT)
Dept: GASTROENTEROLOGY | Age: 78
End: 2018-06-08
Payer: MEDICARE

## 2018-06-08 VITALS
WEIGHT: 147 LBS | HEART RATE: 90 BPM | DIASTOLIC BLOOD PRESSURE: 72 MMHG | BODY MASS INDEX: 29.69 KG/M2 | SYSTOLIC BLOOD PRESSURE: 128 MMHG

## 2018-06-08 DIAGNOSIS — F41.9 ANXIETY: ICD-10-CM

## 2018-06-08 DIAGNOSIS — R11.0 NAUSEA: ICD-10-CM

## 2018-06-08 DIAGNOSIS — K21.9 GASTROESOPHAGEAL REFLUX DISEASE WITHOUT ESOPHAGITIS: Primary | ICD-10-CM

## 2018-06-08 PROCEDURE — G8427 DOCREV CUR MEDS BY ELIG CLIN: HCPCS | Performed by: INTERNAL MEDICINE

## 2018-06-08 PROCEDURE — G8400 PT W/DXA NO RESULTS DOC: HCPCS | Performed by: INTERNAL MEDICINE

## 2018-06-08 PROCEDURE — 1123F ACP DISCUSS/DSCN MKR DOCD: CPT | Performed by: INTERNAL MEDICINE

## 2018-06-08 PROCEDURE — G8598 ASA/ANTIPLAT THER USED: HCPCS | Performed by: INTERNAL MEDICINE

## 2018-06-08 PROCEDURE — 99214 OFFICE O/P EST MOD 30 MIN: CPT | Performed by: INTERNAL MEDICINE

## 2018-06-08 PROCEDURE — 1090F PRES/ABSN URINE INCON ASSESS: CPT | Performed by: INTERNAL MEDICINE

## 2018-06-08 PROCEDURE — G8417 CALC BMI ABV UP PARAM F/U: HCPCS | Performed by: INTERNAL MEDICINE

## 2018-06-08 PROCEDURE — 1036F TOBACCO NON-USER: CPT | Performed by: INTERNAL MEDICINE

## 2018-06-08 PROCEDURE — 4040F PNEUMOC VAC/ADMIN/RCVD: CPT | Performed by: INTERNAL MEDICINE

## 2018-06-08 RX ORDER — RANITIDINE 150 MG/1
150 TABLET ORAL 2 TIMES DAILY
Qty: 60 TABLET | Refills: 1 | Status: CANCELLED | OUTPATIENT
Start: 2018-06-08

## 2018-06-08 ASSESSMENT — ENCOUNTER SYMPTOMS
RECTAL PAIN: 0
COUGH: 1
BLOOD IN STOOL: 0
VOMITING: 0
ABDOMINAL PAIN: 0
DIARRHEA: 0
SHORTNESS OF BREATH: 0
ABDOMINAL DISTENTION: 0
CONSTIPATION: 1
BACK PAIN: 1
NAUSEA: 0
ANAL BLEEDING: 0
SINUS PRESSURE: 1
ALLERGIC/IMMUNOLOGIC NEGATIVE: 1

## 2018-06-11 RX ORDER — RANITIDINE 150 MG/1
150 TABLET ORAL 2 TIMES DAILY
Qty: 60 TABLET | Refills: 1 | Status: SHIPPED | OUTPATIENT
Start: 2018-06-11 | End: 2018-09-12 | Stop reason: CLARIF

## 2018-06-13 ENCOUNTER — TELEPHONE (OUTPATIENT)
Dept: GASTROENTEROLOGY | Age: 78
End: 2018-06-13

## 2018-06-20 RX ORDER — PANTOPRAZOLE SODIUM 40 MG/1
TABLET, DELAYED RELEASE ORAL
Qty: 60 TABLET | Refills: 3 | Status: SHIPPED | OUTPATIENT
Start: 2018-06-20 | End: 2018-09-10

## 2018-07-05 ENCOUNTER — OFFICE VISIT (OUTPATIENT)
Dept: FAMILY MEDICINE CLINIC | Age: 78
End: 2018-07-05
Payer: MEDICARE

## 2018-07-05 ENCOUNTER — HOSPITAL ENCOUNTER (OUTPATIENT)
Age: 78
Discharge: HOME | End: 2018-07-05
Payer: MEDICARE

## 2018-07-05 VITALS
RESPIRATION RATE: 16 BRPM | WEIGHT: 147 LBS | TEMPERATURE: 98.5 F | HEART RATE: 78 BPM | DIASTOLIC BLOOD PRESSURE: 60 MMHG | BODY MASS INDEX: 29.69 KG/M2 | OXYGEN SATURATION: 98 % | SYSTOLIC BLOOD PRESSURE: 112 MMHG

## 2018-07-05 DIAGNOSIS — E78.00 PURE HYPERCHOLESTEROLEMIA: ICD-10-CM

## 2018-07-05 DIAGNOSIS — R26.81 UNSTEADINESS: ICD-10-CM

## 2018-07-05 DIAGNOSIS — I10 ESSENTIAL HYPERTENSION: Primary | ICD-10-CM

## 2018-07-05 DIAGNOSIS — N39.46 MIXED INCONTINENCE: ICD-10-CM

## 2018-07-05 DIAGNOSIS — E55.9 VITAMIN D DEFICIENCY: ICD-10-CM

## 2018-07-05 DIAGNOSIS — E11.42 DIABETIC PERIPHERAL NEUROPATHY (HCC): ICD-10-CM

## 2018-07-05 DIAGNOSIS — D64.9 ANEMIA, UNSPECIFIED TYPE: ICD-10-CM

## 2018-07-05 DIAGNOSIS — E11.8 TYPE 2 DIABETES MELLITUS WITH COMPLICATION, WITHOUT LONG-TERM CURRENT USE OF INSULIN (HCC): ICD-10-CM

## 2018-07-05 DIAGNOSIS — K44.9 HIATAL HERNIA: ICD-10-CM

## 2018-07-05 DIAGNOSIS — E53.8 B12 DEFICIENCY: ICD-10-CM

## 2018-07-05 DIAGNOSIS — K21.9 GASTROESOPHAGEAL REFLUX DISEASE WITHOUT ESOPHAGITIS: ICD-10-CM

## 2018-07-05 DIAGNOSIS — D69.3 CHRONIC ITP (IDIOPATHIC THROMBOCYTOPENIA) (HCC): ICD-10-CM

## 2018-07-05 DIAGNOSIS — I10 ESSENTIAL HYPERTENSION: ICD-10-CM

## 2018-07-05 DIAGNOSIS — K31.84 DIABETIC GASTROPARESIS (HCC): ICD-10-CM

## 2018-07-05 DIAGNOSIS — E11.43 DIABETIC GASTROPARESIS (HCC): ICD-10-CM

## 2018-07-05 LAB
ANION GAP SERPL CALCULATED.3IONS-SCNC: 16 MMOL/L (ref 9–17)
BUN BLDV-MCNC: 18 MG/DL (ref 8–23)
BUN/CREAT BLD: NORMAL (ref 9–20)
CALCIUM SERPL-MCNC: 9 MG/DL (ref 8.6–10.4)
CHLORIDE BLD-SCNC: 106 MMOL/L (ref 98–107)
CO2: 20 MMOL/L (ref 20–31)
CREAT SERPL-MCNC: 0.64 MG/DL (ref 0.5–0.9)
GFR AFRICAN AMERICAN: >60 ML/MIN
GFR NON-AFRICAN AMERICAN: >60 ML/MIN
GFR SERPL CREATININE-BSD FRML MDRD: NORMAL ML/MIN/{1.73_M2}
GFR SERPL CREATININE-BSD FRML MDRD: NORMAL ML/MIN/{1.73_M2}
GLUCOSE BLD-MCNC: 83 MG/DL (ref 70–99)
HCT VFR BLD CALC: 35.2 % (ref 36.3–47.1)
HEMOGLOBIN: 10.4 G/DL (ref 11.9–15.1)
MCH RBC QN AUTO: 24.6 PG (ref 25.2–33.5)
MCHC RBC AUTO-ENTMCNC: 29.5 G/DL (ref 28.4–34.8)
MCV RBC AUTO: 83.4 FL (ref 82.6–102.9)
NRBC AUTOMATED: 0 PER 100 WBC
PDW BLD-RTO: 17.8 % (ref 11.8–14.4)
PLATELET # BLD: ABNORMAL K/UL (ref 138–453)
PLATELET, FLUORESCENCE: 114 K/UL (ref 138–453)
PLATELET, IMMATURE FRACTION: 22.9 % (ref 1.1–10.3)
PMV BLD AUTO: ABNORMAL FL (ref 8.1–13.5)
POTASSIUM SERPL-SCNC: 4.3 MMOL/L (ref 3.7–5.3)
RBC # BLD: 4.22 M/UL (ref 3.95–5.11)
SODIUM BLD-SCNC: 142 MMOL/L (ref 135–144)
VITAMIN B-12: >2000 PG/ML (ref 232–1245)
VITAMIN D 25-HYDROXY: 38.3 NG/ML (ref 30–100)
WBC # BLD: 6.5 K/UL (ref 3.5–11.3)

## 2018-07-05 PROCEDURE — 1123F ACP DISCUSS/DSCN MKR DOCD: CPT | Performed by: FAMILY MEDICINE

## 2018-07-05 PROCEDURE — 85055 RETICULATED PLATELET ASSAY: CPT

## 2018-07-05 PROCEDURE — 85027 COMPLETE CBC AUTOMATED: CPT

## 2018-07-05 PROCEDURE — G8417 CALC BMI ABV UP PARAM F/U: HCPCS | Performed by: FAMILY MEDICINE

## 2018-07-05 PROCEDURE — 99214 OFFICE O/P EST MOD 30 MIN: CPT | Performed by: FAMILY MEDICINE

## 2018-07-05 PROCEDURE — G8598 ASA/ANTIPLAT THER USED: HCPCS | Performed by: FAMILY MEDICINE

## 2018-07-05 PROCEDURE — 96372 THER/PROPH/DIAG INJ SC/IM: CPT | Performed by: FAMILY MEDICINE

## 2018-07-05 PROCEDURE — 0509F URINE INCON PLAN DOCD: CPT | Performed by: FAMILY MEDICINE

## 2018-07-05 PROCEDURE — G8400 PT W/DXA NO RESULTS DOC: HCPCS | Performed by: FAMILY MEDICINE

## 2018-07-05 PROCEDURE — 1090F PRES/ABSN URINE INCON ASSESS: CPT | Performed by: FAMILY MEDICINE

## 2018-07-05 PROCEDURE — 80048 BASIC METABOLIC PNL TOTAL CA: CPT

## 2018-07-05 PROCEDURE — 82607 VITAMIN B-12: CPT

## 2018-07-05 PROCEDURE — 82306 VITAMIN D 25 HYDROXY: CPT

## 2018-07-05 PROCEDURE — 4040F PNEUMOC VAC/ADMIN/RCVD: CPT | Performed by: FAMILY MEDICINE

## 2018-07-05 PROCEDURE — 36415 COLL VENOUS BLD VENIPUNCTURE: CPT

## 2018-07-05 PROCEDURE — 1036F TOBACCO NON-USER: CPT | Performed by: FAMILY MEDICINE

## 2018-07-05 PROCEDURE — G8427 DOCREV CUR MEDS BY ELIG CLIN: HCPCS | Performed by: FAMILY MEDICINE

## 2018-07-05 RX ORDER — CYANOCOBALAMIN 1000 UG/ML
1000 INJECTION INTRAMUSCULAR; SUBCUTANEOUS ONCE
Status: COMPLETED | OUTPATIENT
Start: 2018-07-05 | End: 2018-07-05

## 2018-07-05 RX ADMIN — CYANOCOBALAMIN 1000 MCG: 1000 INJECTION INTRAMUSCULAR; SUBCUTANEOUS at 11:29

## 2018-07-05 ASSESSMENT — ENCOUNTER SYMPTOMS
ABDOMINAL PAIN: 0
COUGH: 0
VOMITING: 0
SORE THROAT: 0
DIARRHEA: 0
CHEST TIGHTNESS: 0
NAUSEA: 1
SHORTNESS OF BREATH: 0
ABDOMINAL DISTENTION: 0
BACK PAIN: 0
CONSTIPATION: 0
RHINORRHEA: 0

## 2018-07-05 NOTE — PROGRESS NOTES
Subjective:      HYPERTENSION visit     BP Readings from Last 3 Encounters:   07/05/18 112/60   06/08/18 128/72   04/04/18 124/75       LDL Calculated (mg/dL)   Date Value   03/12/2015 47     LDL Cholesterol (mg/dL)   Date Value   03/26/2018 44     HDL (mg/dL)   Date Value   03/26/2018 53     BUN (mg/dL)   Date Value   03/26/2018 20     CREATININE (mg/dL)   Date Value   03/26/2018 0.72     Glucose   Date Value   03/26/2018 114 mg/dL (H)   06/02/2016 140 mg/dl (H)              Have you changed or started any medications since your last visit including any over-the-counter medicines, vitamins, or herbal medicines? yes - see med list   Have you stopped taking any of your medications? Is so, why? -  no  Are you having any side effects from any of your medications? - no  How often do you miss doses of your medication? rare      Have you seen any other physician or provider since your last visit? yes - Dr.s Antonio Tracey   Have you had any other diagnostic tests since your last visit?  no   Have you been seen in the emergency room and/or had an admission in a hospital since we last saw you?  no   Have you had your routine dental cleaning in the past 6 months?  yes - is due now     Do you have an active MyChart account? If no, what is the barrier?   Yes    Patient Care Team:  Virginie Shah MD as PCP - General (Family Medicine)  Virginie Shah MD as PCP - S Attributed Provider  Bhavana Thompson MD as Consulting Physician (Cardiology)  Reyna Street MD as Consulting Physician (Neurology)  Vanessa Mckenna MD as Consulting Physician (Urology)  Dhaval Sahu DPM as Consulting Physician (Podiatry)  Jae Lopez MD as Consulting Physician (Gastroenterology)  Lei Massey MD as Consulting Physician (Hematology and Oncology)  Juan Watson MD as Consulting Physician (Neurology)  Shivani Tatum MD as Consulting Physician (Orthopedic Surgery)  Mela Ortiz MD as Surgeon (Pediatric Neurosurgery)    Medical History Review  Past Medical, Family, and Social History reviewed and does contribute to the patient presenting condition    Health Maintenance   Topic Date Due    Shingles Vaccine (1 of 2 - 2 Dose Series) 07/05/2019 (Originally 8/12/1991)    DTaP/Tdap/Td vaccine (1 - Tdap) 01/09/2022 (Originally 8/12/1959)    Flu vaccine (1) 09/01/2018    Potassium monitoring  03/26/2019    Creatinine monitoring  03/26/2019    DEXA (modify frequency per FRAX score)  Addressed    Pneumococcal low/med risk  Completed     Patient/Caregiver verbalize understanding of medications. Yes    Patient ID: Claudette Barreto is a 69 y.o. female. HPI  Pt here today for f/u on chronic medical problems:  HTN, HLD, DM, ITP, DPN, Anemia, Vit D Def B12 Def, GERD, Hiatal Hernia, Mixed Incontinence,go over labs and/or diagnostic studies, and medication refills. Pt today denies any HA, chest pain, or SOB. Pt denies any N/V/D/C or abdominal pain. Pt denies any fever or chills. Pt does c/o unsteadiness and off balance. Pt with occasional heartburn, but stable on meds. Pt is scheduled for an EGD in 2 weeks with Dr. Kraig Miguel for her ongoing nausea. Pt states urinary incontinence stable on meds. BS's outside the office running 's. Pt didn't get labs prior to this visit. Otherwise pt doing well on current tx and no other concerns today. The patient's past medical, surgical, social, and family history as well as his current medications and allergies were reviewed as documented in today's encounter.       Current Outpatient Prescriptions   Medication Sig Dispense Refill    pantoprazole (PROTONIX) 40 MG tablet TAKE 1 TABLET BY MOUTH TWO TIMES A DAY  60 tablet 3    ranitidine (ZANTAC) 150 MG tablet Take 1 tablet by mouth 2 times daily 60 tablet 1    trospium (SANCTURA) 60 MG CP24 extended release capsule TAKE 1 CAPSULE BY MOUTH ONE TIME A DAY 90 capsule 3    vitamin D (ERGOCALCIFEROL) 16041 units CAPS capsule TAKE 1

## 2018-07-12 PROBLEM — R26.81 UNSTEADINESS: Chronic | Status: ACTIVE | Noted: 2018-07-05

## 2018-07-16 ENCOUNTER — HOSPITAL ENCOUNTER (OUTPATIENT)
Age: 78
Discharge: HOME | End: 2018-07-16
Payer: MEDICARE

## 2018-07-16 LAB
ALBUMIN SERPL-MCNC: 4.4 G/DL (ref 3.5–5.2)
ALBUMIN/GLOBULIN RATIO: ABNORMAL (ref 1–2.5)
ALP BLD-CCNC: 59 U/L (ref 35–104)
ALT SERPL-CCNC: 12 U/L (ref 5–33)
ANION GAP SERPL CALCULATED.3IONS-SCNC: 16 MMOL/L (ref 9–17)
AST SERPL-CCNC: 14 U/L
BILIRUB SERPL-MCNC: 0.49 MG/DL (ref 0.3–1.2)
BUN BLDV-MCNC: 22 MG/DL (ref 8–23)
BUN/CREAT BLD: ABNORMAL (ref 9–20)
CALCIUM SERPL-MCNC: 9.6 MG/DL (ref 8.6–10.4)
CHLORIDE BLD-SCNC: 104 MMOL/L (ref 98–107)
CHOLESTEROL/HDL RATIO: 2.7
CHOLESTEROL: 137 MG/DL
CO2: 20 MMOL/L (ref 20–31)
CREAT SERPL-MCNC: 0.64 MG/DL (ref 0.5–0.9)
CREATININE URINE: 62.9 MG/DL (ref 28–217)
ESTIMATED AVERAGE GLUCOSE: 128 MG/DL
GFR AFRICAN AMERICAN: >60 ML/MIN
GFR NON-AFRICAN AMERICAN: >60 ML/MIN
GFR SERPL CREATININE-BSD FRML MDRD: ABNORMAL ML/MIN/{1.73_M2}
GFR SERPL CREATININE-BSD FRML MDRD: ABNORMAL ML/MIN/{1.73_M2}
GLUCOSE BLD-MCNC: 109 MG/DL (ref 70–99)
HBA1C MFR BLD: 6.1 % (ref 4–6)
HDLC SERPL-MCNC: 51 MG/DL
LDL CHOLESTEROL: 64 MG/DL (ref 0–130)
MICROALBUMIN/CREAT 24H UR: 22 MG/L
MICROALBUMIN/CREAT UR-RTO: 35 MCG/MG CREAT
POTASSIUM SERPL-SCNC: 4.3 MMOL/L (ref 3.7–5.3)
SODIUM BLD-SCNC: 140 MMOL/L (ref 135–144)
TOTAL PROTEIN: 7.3 G/DL (ref 6.4–8.3)
TRIGL SERPL-MCNC: 110 MG/DL
VLDLC SERPL CALC-MCNC: NORMAL MG/DL (ref 1–30)

## 2018-07-16 PROCEDURE — 80053 COMPREHEN METABOLIC PANEL: CPT

## 2018-07-16 PROCEDURE — 80061 LIPID PANEL: CPT

## 2018-07-16 PROCEDURE — 36415 COLL VENOUS BLD VENIPUNCTURE: CPT

## 2018-07-16 PROCEDURE — 83036 HEMOGLOBIN GLYCOSYLATED A1C: CPT

## 2018-07-16 PROCEDURE — 82570 ASSAY OF URINE CREATININE: CPT

## 2018-07-16 PROCEDURE — 82043 UR ALBUMIN QUANTITATIVE: CPT

## 2018-07-17 ENCOUNTER — HOSPITAL ENCOUNTER (OUTPATIENT)
Age: 78
Setting detail: OUTPATIENT SURGERY
Discharge: HOME | End: 2018-07-17
Attending: INTERNAL MEDICINE | Admitting: INTERNAL MEDICINE
Payer: MEDICARE

## 2018-07-17 VITALS
HEART RATE: 66 BPM | HEIGHT: 59 IN | DIASTOLIC BLOOD PRESSURE: 72 MMHG | SYSTOLIC BLOOD PRESSURE: 132 MMHG | OXYGEN SATURATION: 98 % | RESPIRATION RATE: 16 BRPM | BODY MASS INDEX: 29.64 KG/M2 | WEIGHT: 147 LBS | TEMPERATURE: 97.9 F

## 2018-07-17 PROCEDURE — 99152 MOD SED SAME PHYS/QHP 5/>YRS: CPT | Performed by: INTERNAL MEDICINE

## 2018-07-17 PROCEDURE — 2580000003 HC RX 258: Performed by: INTERNAL MEDICINE

## 2018-07-17 PROCEDURE — 7100000010 HC PHASE II RECOVERY - FIRST 15 MIN: Performed by: INTERNAL MEDICINE

## 2018-07-17 PROCEDURE — 2709999900 HC NON-CHARGEABLE SUPPLY: Performed by: INTERNAL MEDICINE

## 2018-07-17 PROCEDURE — 6360000002 HC RX W HCPCS: Performed by: INTERNAL MEDICINE

## 2018-07-17 PROCEDURE — 88305 TISSUE EXAM BY PATHOLOGIST: CPT

## 2018-07-17 PROCEDURE — 7100000011 HC PHASE II RECOVERY - ADDTL 15 MIN: Performed by: INTERNAL MEDICINE

## 2018-07-17 PROCEDURE — 88342 IMHCHEM/IMCYTCHM 1ST ANTB: CPT

## 2018-07-17 PROCEDURE — 3609012400 HC EGD TRANSORAL BIOPSY SINGLE/MULTIPLE: Performed by: INTERNAL MEDICINE

## 2018-07-17 RX ORDER — FENTANYL CITRATE 50 UG/ML
INJECTION, SOLUTION INTRAMUSCULAR; INTRAVENOUS PRN
Status: DISCONTINUED | OUTPATIENT
Start: 2018-07-17 | End: 2018-07-17 | Stop reason: HOSPADM

## 2018-07-17 RX ORDER — SODIUM CHLORIDE, SODIUM LACTATE, POTASSIUM CHLORIDE, CALCIUM CHLORIDE 600; 310; 30; 20 MG/100ML; MG/100ML; MG/100ML; MG/100ML
INJECTION, SOLUTION INTRAVENOUS CONTINUOUS
Status: DISCONTINUED | OUTPATIENT
Start: 2018-07-17 | End: 2018-07-17 | Stop reason: HOSPADM

## 2018-07-17 RX ORDER — MIDAZOLAM HYDROCHLORIDE 1 MG/ML
INJECTION INTRAMUSCULAR; INTRAVENOUS PRN
Status: DISCONTINUED | OUTPATIENT
Start: 2018-07-17 | End: 2018-07-17 | Stop reason: HOSPADM

## 2018-07-17 RX ADMIN — SODIUM CHLORIDE, POTASSIUM CHLORIDE, SODIUM LACTATE AND CALCIUM CHLORIDE: 600; 310; 30; 20 INJECTION, SOLUTION INTRAVENOUS at 09:54

## 2018-07-17 ASSESSMENT — PAIN SCALES - GENERAL: PAINLEVEL_OUTOF10: 0

## 2018-07-17 ASSESSMENT — PAIN - FUNCTIONAL ASSESSMENT: PAIN_FUNCTIONAL_ASSESSMENT: 0-10

## 2018-07-17 NOTE — H&P
Cancer Mother         breast and uterine  39    Heart Disease Father     Heart Attack Father          28   [address] Maternal Grandmother     Cancer Brother 46        bronchogenic adenocarcinoma cancer    Lung Cancer Brother     Cancer Sister         lung    Lung Cancer Sister          72    Breast Cancer Sister          62    Cancer Sister         breast     SOCIAL HISTORY       Social History     Social History    Marital status:      Spouse name: N/A    Number of children: N/A    Years of education: N/A     Occupational History    retired      Social History Main Topics    Smoking status: Former Smoker     Packs/day: 0.50     Years: 20.00     Quit date: 1982    Smokeless tobacco: Former User     Quit date: 1982    Alcohol use No    Drug use: No    Sexual activity: Not on file     Other Topics Concern    Not on file     Social History Narrative    No narrative on file     REVIEW OF SYSTEMS      Allergies   Allergen Reactions    Betadine [Povidone Iodine] Hives    Cephalexin Hives and Swelling    Contrast [Iodides] Hives and Itching     \"Contrast dyes\"  This was added by someone but Patient states has had IVP dyes multiple times without problems and had a myelogram in Dec 2016 without problems    Demerol Hives and Swelling    Doxycycline Other (See Comments)     Sore mouth      Morphine Hives     itching    Pcn [Penicillins] Hives and Swelling    Sulfa Antibiotics Hives and Swelling    Toviaz [Fesoterodine Fumarate Er]     Zithromax [Azithromycin] Other (See Comments)     Sore mouth    Cymbalta [Duloxetine Hcl] Nausea And Vomiting and Other (See Comments)     Weakness, diarrhea       Current Facility-Administered Medications on File Prior to Encounter   Medication Dose Route Frequency Provider Last Rate Last Dose    0.9 % sodium chloride infusion 250 mL  250 mL Intravenous Once Steven Maldonado MD         Current Outpatient Prescriptions Conjunctiva clear, no pallor. EARS:  No discharge, no marked hearing loss. NOSE:  No rhinorrhea, epistaxis or septal deformity. THROAT:  Not congested. No tonsillar erythema or exudates. Dentures. NECK:  No stiffness, trachea central.  No palpable masses or lymphadenopathy. CHEST:  Symmetrical and equal on expansion. HEART:  Hypertensive. Regular rate, rhythm. No murmur. LUNGS:  Equal on expansion. Clear to auscultation with no adventitious sounds. ABDOMEN:  Obese. Soft on palpation. No localized tenderness, guarding or rigidity. No palpable organomegaly. LYMPHATICS:  No palpable cervical lymphadenopathy. LOCOMOTOR, BACK AND SPINE:  No tenderness or deformities. No flank tenderness. EXTREMITIES:  Symmetrical with no pretibial/pedal edema. No discoloration or ulcerations. No warmth, tenderness, erythema noted in lower legs bilaterally. NEUROLOGIC:  The patient is conscious, alert, oriented. No apparent focal sensory or motor deficits. Cranial nerve exam reveals no deficits. PROVISIONAL DIAGNOSES / SURGERY:      1. Gastroesophageal Reflux Disease  2.  Nausea    - Esophagogastroduodenoscopy    Patient Active Problem List    Diagnosis Date Noted    Unsteadiness 07/05/2018    Chronic ITP (idiopathic thrombocytopenia) (HCC) 04/12/2017    S/P lumbar fusion 03/22/2017    Type 2 diabetes mellitus with complication, without long-term current use of insulin ([address] 06/29/2016    Internal hemorrhoid     Tubular adenoma     Colon polyps     Hiatal hernia     Diabetic gastroparesis ([address]     Pure hypercholesterolemia 03/01/2016    Recurrent UTI 11/30/2015    Essential hypertension 10/14/2015    Gastroesophageal reflux disease without esophagitis 10/14/2015    Mixed incontinence 10/14/2015    Bilateral renal cysts 08/31/2015    Acquired cyst of kidney 08/03/2015    Microscopic hematuria 08/03/2015    Age-related cognitive decline     DDD (degenerative disc disease), cervical 07/06/2015    Anemia 01/13/2015    Vitamin D deficiency 09/18/2014    B12 deficiency     Constipation 05/15/2014    Macular degeneration of left eye     Diabetic peripheral neuropathy ([facility] 10/24/2012    TIA (transient ischemic attack)     Chronic back pain            Edwin Gilmore PA-C on 7/17/2018 at 9:46 AM

## 2018-07-18 LAB — SURGICAL PATHOLOGY REPORT: NORMAL

## 2018-07-18 NOTE — TELEPHONE ENCOUNTER
Patient stops into office while in clinic requesting that we call her benefit coverage review plan to get her zantac approved. Since that is the only thing that has helped her.     [phone]  Open from [phone]- [phone]    Please advise patient

## 2018-07-19 RX ORDER — FAMOTIDINE 20 MG/1
20 TABLET, FILM COATED ORAL 2 TIMES DAILY
Qty: 60 TABLET | Refills: 1 | Status: SHIPPED | OUTPATIENT
Start: 2018-07-19 | End: 2018-09-10

## 2018-07-24 ENCOUNTER — TELEPHONE (OUTPATIENT)
Dept: FAMILY MEDICINE CLINIC | Age: 78
End: 2018-07-24

## 2018-07-24 RX ORDER — LOSARTAN POTASSIUM 25 MG/1
25 TABLET ORAL DAILY
Qty: 90 TABLET | Refills: 3 | Status: SHIPPED | OUTPATIENT
Start: 2018-07-24 | End: 2018-07-30 | Stop reason: SINTOL

## 2018-07-30 ENCOUNTER — TELEPHONE (OUTPATIENT)
Dept: FAMILY MEDICINE CLINIC | Age: 78
End: 2018-07-30

## 2018-07-30 RX ORDER — LISINOPRIL 2.5 MG/1
2.5 TABLET ORAL DAILY
Qty: 30 TABLET | Refills: 11 | Status: SHIPPED | OUTPATIENT
Start: 2018-07-30 | End: 2018-10-30

## 2018-08-16 ENCOUNTER — OFFICE VISIT (OUTPATIENT)
Dept: FAMILY MEDICINE CLINIC | Age: 78
End: 2018-08-16
Payer: MEDICARE

## 2018-08-16 VITALS
SYSTOLIC BLOOD PRESSURE: 112 MMHG | HEART RATE: 96 BPM | DIASTOLIC BLOOD PRESSURE: 66 MMHG | OXYGEN SATURATION: 96 % | RESPIRATION RATE: 16 BRPM | WEIGHT: 143 LBS | BODY MASS INDEX: 28.88 KG/M2 | TEMPERATURE: 100.3 F

## 2018-08-16 DIAGNOSIS — J20.9 ACUTE BRONCHITIS, UNSPECIFIED ORGANISM: Primary | ICD-10-CM

## 2018-08-16 PROCEDURE — G8598 ASA/ANTIPLAT THER USED: HCPCS | Performed by: FAMILY MEDICINE

## 2018-08-16 PROCEDURE — 4040F PNEUMOC VAC/ADMIN/RCVD: CPT | Performed by: FAMILY MEDICINE

## 2018-08-16 PROCEDURE — G8427 DOCREV CUR MEDS BY ELIG CLIN: HCPCS | Performed by: FAMILY MEDICINE

## 2018-08-16 PROCEDURE — G8417 CALC BMI ABV UP PARAM F/U: HCPCS | Performed by: FAMILY MEDICINE

## 2018-08-16 PROCEDURE — 1123F ACP DISCUSS/DSCN MKR DOCD: CPT | Performed by: FAMILY MEDICINE

## 2018-08-16 PROCEDURE — G8400 PT W/DXA NO RESULTS DOC: HCPCS | Performed by: FAMILY MEDICINE

## 2018-08-16 PROCEDURE — 1101F PT FALLS ASSESS-DOCD LE1/YR: CPT | Performed by: FAMILY MEDICINE

## 2018-08-16 PROCEDURE — 1090F PRES/ABSN URINE INCON ASSESS: CPT | Performed by: FAMILY MEDICINE

## 2018-08-16 PROCEDURE — 99213 OFFICE O/P EST LOW 20 MIN: CPT | Performed by: FAMILY MEDICINE

## 2018-08-16 PROCEDURE — 1036F TOBACCO NON-USER: CPT | Performed by: FAMILY MEDICINE

## 2018-08-16 RX ORDER — LEVOFLOXACIN 500 MG/1
500 TABLET, FILM COATED ORAL DAILY
Qty: 7 TABLET | Refills: 0 | Status: SHIPPED | OUTPATIENT
Start: 2018-08-16 | End: 2018-08-23

## 2018-08-16 ASSESSMENT — PATIENT HEALTH QUESTIONNAIRE - PHQ9
SUM OF ALL RESPONSES TO PHQ QUESTIONS 1-9: 1
2. FEELING DOWN, DEPRESSED OR HOPELESS: 1
SUM OF ALL RESPONSES TO PHQ QUESTIONS 1-9: 1
1. LITTLE INTEREST OR PLEASURE IN DOING THINGS: 0
SUM OF ALL RESPONSES TO PHQ9 QUESTIONS 1 & 2: 1

## 2018-08-16 NOTE — PROGRESS NOTES
Visit Information    Have you changed or started any medications since your last visit including any over-the-counter medicines, vitamins, or herbal medicines? yes - see med list   Have you stopped taking any of your medications? Is so, why? -  no  Are you having any side effects from any of your medications? - no    Have you seen any other physician or provider since your last visit?  no   Have you had any other diagnostic tests since your last visit?  no   Have you been seen in the emergency room and/or had an admission in a hospital since we last saw you?  no   Have you had your routine dental cleaning in the past 6 months?  no     Do you have an active MyChart account? If no, what is the barrier?   Yes    Patient Care Team:  Kevin Paul MD as PCP - General (Family Medicine)  Kevin Paul MD as PCP - S Attributed Provider  Abhijit Mcbride MD as Consulting Physician (Cardiology)  Adriana Ford MD as Consulting Physician (Neurology)  Jody Das MD as Consulting Physician (Urology)  Kaitlin Delacruz DPM as Consulting Physician (Podiatry)  Willi Mcnamara MD as Consulting Physician (Gastroenterology)  Rg Vallejo MD as Consulting Physician (Hematology and Oncology)  Myles Baldwin MD as Consulting Physician (Neurology)  Maxine Wilkins MD as Consulting Physician (Orthopedic Surgery)  Shy Gambino MD as Surgeon (Pediatric Neurosurgery)    Medical History Review  Past Medical, Family, and Social History reviewed and does not contribute to the patient presenting condition    Health Maintenance   Topic Date Due    Shingles Vaccine (1 of 2 - 2 Dose Series) 07/05/2019 (Originally 8/12/1991)    DTaP/Tdap/Td vaccine (1 - Tdap) 01/09/2022 (Originally 8/12/1959)    Flu vaccine (1) 09/01/2018    Potassium monitoring  07/16/2019    Creatinine monitoring  07/16/2019    DEXA (modify frequency per FRAX score)  Addressed    Pneumococcal low/med risk  Completed     Patient/Caregiver

## 2018-08-16 NOTE — PROGRESS NOTES
SUBJECTIVE:   Loyd Dyer is a 69 y.o. female who complains of coryza, congestion, sore throat, post nasal drip, dry cough, fever, headache, bilateral sinus pain, bilateral ear fullness and hoarseness for 2 days. She denies a history of anorexia, chest pain, myalgias, shortness of breath, sweats, vomiting and weakness. She denies a history of asthma. Patient does not smoke cigarettes. OBJECTIVE:  Vitals as noted above. Appearance: alert, well appearing, and in no distress and well hydrated. ENT- ENT exam normal, no neck nodes or sinus tenderness, bilateral TM normal without fluid or infection, neck without nodes, B/L maxillary sinus tenderness and post nasal drip noted. Chest - rhonchi noted both lung fields. Heart - RRR    ASSESSMENT:   Acute bronchitis    PLAN:  Per Orders - Levaquin as ordered  Resume her Albuterol Aerosal every 4-6 hours prn and the Tessalon Pearles  Symptomatic therapy suggested: push fluids, rest and use acetaminophen, ibuprofen, OTC NS, and antihistamine-decongestant of choice prn. Call or return to clinic prn if these symptoms worsen or fail to improve as anticipated.

## 2018-08-24 ENCOUNTER — TELEPHONE (OUTPATIENT)
Dept: FAMILY MEDICINE CLINIC | Age: 78
End: 2018-08-24

## 2018-08-24 ENCOUNTER — HOSPITAL ENCOUNTER (OUTPATIENT)
Age: 78
Setting detail: SPECIMEN
Discharge: HOME | End: 2018-08-24
Payer: MEDICARE

## 2018-08-24 DIAGNOSIS — M79.10 MYALGIA: Primary | ICD-10-CM

## 2018-08-24 DIAGNOSIS — M79.10 MYALGIA: ICD-10-CM

## 2018-08-24 LAB
ALBUMIN SERPL-MCNC: 4.2 G/DL (ref 3.5–5.2)
ANION GAP SERPL CALCULATED.3IONS-SCNC: 13 MMOL/L (ref 9–17)
BUN BLDV-MCNC: 18 MG/DL (ref 8–23)
BUN/CREAT BLD: NORMAL (ref 9–20)
CALCIUM SERPL-MCNC: 9.3 MG/DL (ref 8.6–10.4)
CHLORIDE BLD-SCNC: 106 MMOL/L (ref 98–107)
CO2: 24 MMOL/L (ref 20–31)
CREAT SERPL-MCNC: 0.62 MG/DL (ref 0.5–0.9)
GFR AFRICAN AMERICAN: >60 ML/MIN
GFR NON-AFRICAN AMERICAN: >60 ML/MIN
GFR SERPL CREATININE-BSD FRML MDRD: NORMAL ML/MIN/{1.73_M2}
GFR SERPL CREATININE-BSD FRML MDRD: NORMAL ML/MIN/{1.73_M2}
GLUCOSE BLD-MCNC: 90 MG/DL (ref 70–99)
MYOGLOBIN: <21 NG/ML (ref 25–58)
PHOSPHORUS: 3.5 MG/DL (ref 2.6–4.5)
POTASSIUM SERPL-SCNC: 4.5 MMOL/L (ref 3.7–5.3)
SODIUM BLD-SCNC: 143 MMOL/L (ref 135–144)
TOTAL CK: 63 U/L (ref 26–192)

## 2018-09-10 ENCOUNTER — OFFICE VISIT (OUTPATIENT)
Dept: GASTROENTEROLOGY | Age: 78
End: 2018-09-10
Payer: MEDICARE

## 2018-09-10 VITALS
DIASTOLIC BLOOD PRESSURE: 89 MMHG | HEART RATE: 86 BPM | WEIGHT: 142.7 LBS | BODY MASS INDEX: 28.82 KG/M2 | SYSTOLIC BLOOD PRESSURE: 130 MMHG

## 2018-09-10 DIAGNOSIS — R10.13 ABDOMINAL PAIN, EPIGASTRIC: ICD-10-CM

## 2018-09-10 DIAGNOSIS — K21.9 GASTROESOPHAGEAL REFLUX DISEASE WITHOUT ESOPHAGITIS: Primary | ICD-10-CM

## 2018-09-10 DIAGNOSIS — K44.9 HIATAL HERNIA: ICD-10-CM

## 2018-09-10 PROCEDURE — G8598 ASA/ANTIPLAT THER USED: HCPCS | Performed by: INTERNAL MEDICINE

## 2018-09-10 PROCEDURE — 1090F PRES/ABSN URINE INCON ASSESS: CPT | Performed by: INTERNAL MEDICINE

## 2018-09-10 PROCEDURE — G8427 DOCREV CUR MEDS BY ELIG CLIN: HCPCS | Performed by: INTERNAL MEDICINE

## 2018-09-10 PROCEDURE — 4040F PNEUMOC VAC/ADMIN/RCVD: CPT | Performed by: INTERNAL MEDICINE

## 2018-09-10 PROCEDURE — 1123F ACP DISCUSS/DSCN MKR DOCD: CPT | Performed by: INTERNAL MEDICINE

## 2018-09-10 PROCEDURE — G8400 PT W/DXA NO RESULTS DOC: HCPCS | Performed by: INTERNAL MEDICINE

## 2018-09-10 PROCEDURE — 99214 OFFICE O/P EST MOD 30 MIN: CPT | Performed by: INTERNAL MEDICINE

## 2018-09-10 PROCEDURE — 1101F PT FALLS ASSESS-DOCD LE1/YR: CPT | Performed by: INTERNAL MEDICINE

## 2018-09-10 PROCEDURE — G8417 CALC BMI ABV UP PARAM F/U: HCPCS | Performed by: INTERNAL MEDICINE

## 2018-09-10 PROCEDURE — 1036F TOBACCO NON-USER: CPT | Performed by: INTERNAL MEDICINE

## 2018-09-10 RX ORDER — RANITIDINE 150 MG/1
300 TABLET ORAL 2 TIMES DAILY
Qty: 180 TABLET | Refills: 3 | Status: SHIPPED | OUTPATIENT
Start: 2018-09-10 | End: 2018-09-12 | Stop reason: CLARIF

## 2018-09-10 ASSESSMENT — ENCOUNTER SYMPTOMS
VOMITING: 1
ABDOMINAL DISTENTION: 1
BLOOD IN STOOL: 0
DIARRHEA: 0
ALLERGIC/IMMUNOLOGIC NEGATIVE: 1
SHORTNESS OF BREATH: 0
CONSTIPATION: 0
ANAL BLEEDING: 0
NAUSEA: 0
SINUS PRESSURE: 1
BACK PAIN: 1
ABDOMINAL PAIN: 1
RECTAL PAIN: 0
COUGH: 1

## 2018-09-10 NOTE — PROGRESS NOTES
explained about the beneficial effects they have in the GI track  They will help to establish the good bacterial jaguar and will help with the digestion and bowel movements  The patient has verbalized understanding and agreement to this plan

## 2018-09-12 ENCOUNTER — TELEPHONE (OUTPATIENT)
Dept: GASTROENTEROLOGY | Age: 78
End: 2018-09-12

## 2018-09-12 RX ORDER — RANITIDINE 300 MG/1
300 TABLET ORAL 2 TIMES DAILY
Qty: 30 TABLET | Refills: 1 | Status: SHIPPED | OUTPATIENT
Start: 2018-09-12 | End: 2018-10-15 | Stop reason: SDUPTHER

## 2018-09-12 NOTE — TELEPHONE ENCOUNTER
Patient calling because Dr Bianca Metcalf was suppose to send a rx for generic Zantac 300mg and she states that we sent the 150mg. She said she cannot afford the 150 and would like the 300mg called in if possible [facility] to [facility] [place].

## 2018-09-12 NOTE — TELEPHONE ENCOUNTER
Pt called, states insurance won't pay for Zantac 150mg x2, but will pay for Zantac 300mg tab. Ok per Dr Janey Kwong to reorder rx. Sent to pharmacy.

## 2018-09-19 ENCOUNTER — OFFICE VISIT (OUTPATIENT)
Dept: ORTHOPEDIC SURGERY | Age: 78
End: 2018-09-19
Payer: MEDICARE

## 2018-09-19 VITALS — BODY MASS INDEX: 28.63 KG/M2 | WEIGHT: 142 LBS | OXYGEN SATURATION: 100 % | HEIGHT: 59 IN | HEART RATE: 98 BPM

## 2018-09-19 DIAGNOSIS — M25.562 CHRONIC PAIN OF LEFT KNEE: Primary | ICD-10-CM

## 2018-09-19 DIAGNOSIS — G89.29 CHRONIC PAIN OF LEFT KNEE: Primary | ICD-10-CM

## 2018-09-19 DIAGNOSIS — S99.921A RIGHT FOOT INJURY, INITIAL ENCOUNTER: ICD-10-CM

## 2018-09-19 DIAGNOSIS — Z96.653 HISTORY OF TOTAL KNEE REPLACEMENT, BILATERAL: ICD-10-CM

## 2018-09-19 PROCEDURE — 4040F PNEUMOC VAC/ADMIN/RCVD: CPT | Performed by: ORTHOPAEDIC SURGERY

## 2018-09-19 PROCEDURE — G8417 CALC BMI ABV UP PARAM F/U: HCPCS | Performed by: ORTHOPAEDIC SURGERY

## 2018-09-19 PROCEDURE — 1090F PRES/ABSN URINE INCON ASSESS: CPT | Performed by: ORTHOPAEDIC SURGERY

## 2018-09-19 PROCEDURE — G8427 DOCREV CUR MEDS BY ELIG CLIN: HCPCS | Performed by: ORTHOPAEDIC SURGERY

## 2018-09-19 PROCEDURE — 28470 CLTX METATARSAL FX WO MNP EA: CPT | Performed by: ORTHOPAEDIC SURGERY

## 2018-09-19 PROCEDURE — G8598 ASA/ANTIPLAT THER USED: HCPCS | Performed by: ORTHOPAEDIC SURGERY

## 2018-09-19 PROCEDURE — G8400 PT W/DXA NO RESULTS DOC: HCPCS | Performed by: ORTHOPAEDIC SURGERY

## 2018-09-19 PROCEDURE — 1123F ACP DISCUSS/DSCN MKR DOCD: CPT | Performed by: ORTHOPAEDIC SURGERY

## 2018-09-19 PROCEDURE — 1101F PT FALLS ASSESS-DOCD LE1/YR: CPT | Performed by: ORTHOPAEDIC SURGERY

## 2018-09-19 PROCEDURE — 1036F TOBACCO NON-USER: CPT | Performed by: ORTHOPAEDIC SURGERY

## 2018-09-19 PROCEDURE — 99024 POSTOP FOLLOW-UP VISIT: CPT | Performed by: ORTHOPAEDIC SURGERY

## 2018-09-25 NOTE — PROGRESS NOTES
Cheyanne Fuller M.D.            [address] [place] [address]., [address] [facility], [address] [facility]             Dept Phone: [phone]             Dept Fax:  [phone] returns today. She status post left total knee 18 years and right total knee 2 years. She has no complaints of the knees but she says did sustain an injury and she fell hips up and on the top of her right foot and she's complaining of that. Examination of her left knee and right knee shows good motion through all planes. She has good stability good patellar tracking overall doing well    Examination her right foot reveals tenderness in the right mid foot near the third metatarsus. No forefoot pain no ankle tenderness. Patient's x-rays are taken reviewed by me show her total knees in good position without interval change    Patient did have x-rays taken today of her foot which noted a questionable fracture of the base of third metatarsus    Plan  Patient is doing well with her knees. We did give her fracture shoe the wear we'll have her back here 1 month for repeat x-rays of her foot          Review of Systems   Constitutional: Negative. HENT: Negative. Respiratory: Negative. Cardiovascular: Negative. Musculoskeletal: Negative. Neurological: Negative. Past Medical History:   Diagnosis Date    Age-related cognitive decline     Ankle pain     Left ankle fracture in ortho boat.    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    left with urinary incontinenc post surgical    Caffeine use     2 coffee/day, 1-2 tea per week    Chronic back pain     Chronic ITP (idiopathic thrombocytopenia) (HCC)     CVA (cerebral infarction) 2008, 2010    balance issues, short term memory loss    Diabetic gastroparesis ([address]     Diverticular disease 12    GERD (gastroesophageal reflux disease)     Hiatal hernia     Hip fracture ([address]     History of decreased platelet count     Hyperlipemia     Hypertension     Internal hemorrhoid     Macular degeneration of left eye 1980's    S/P laser treatment    Nausea and vomiting     Neuropathy     Osteoarthritis     Ringing in ears     Self-catheterizes urinary bladder     TIA (transient ischemic attack) 2000    x1    Tubular adenoma     colon polyps    Type II or unspecified type diabetes mellitus without mention of complication, not stated as uncontrolled     Urinary incontinence     frequent UTI s/p infection, surgical dilatation lead to incontinence    Wears dentures     full on top, partial on bottom    Wears glasses      Past Surgical History:   Procedure Laterality Date    APPENDECTOMY  1962    BLADDER SURGERY      bladder stimulator    BLADDER SUSPENSION  2002    multiple    CARDIAC CATHETERIZATION  2008    no stents    CARDIOVASCULAR STRESS TEST  12/2014    CATARACT REMOVAL WITH IMPLANT Left 11/07/2017    [facility]    CATARACT REMOVAL WITH IMPLANT Right 11/28/2017    [facility]    COLON SURGERY      colostory reversal    COLONOSCOPY  4/7/16    tubular adenoma x3; internal hemorrhoids    COLOSTOMY  1986    bowel obstruction/ rupture from diverticular disease, reversal 3 mos later    CYSTOSCOPY  09/08/2017    W/ 200IU Botox     FRACTURE SURGERY Right 2011    hip    FRACTURE SURGERY Left 2001    WRIST    FRACTURE SURGERY Left 2013    ankle    HYSTERECTOMY  1969    JOINT REPLACEMENT Bilateral 2000    BILAT KNEES    LUMBAR FUSION  2017    L2/L3    ME REMV CATARACT EXTRACAP,INSERT LENS Left 11/7/2017    EYE CATARACT EMULSIFICATION IOL IMPLANT performed by Ari Naylor MD at [address] [place] [address] Right 11/28/2017    EYE CATARACT EMULSIFICATION IOL IMPLANT performed by Ari Naylor MD at [facility] Right 10/27/15    WITH POLY EXCHANGE BIOMET AND GPS APPLICATION    SPINE SURGERY      fusion, did not take   [address]    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    NEAL AND BSO      TONSILLECTOMY      TONSILLECTOMY      UPPER GASTROINTESTINAL ENDOSCOPY      UPPER GASTROINTESTINAL ENDOSCOPY  14    UPPER GASTROINTESTINAL ENDOSCOPY  2016    mild gastritis    UPPER GASTROINTESTINAL ENDOSCOPY N/A 2018    retained thick secretions in proximal esophagus     Family History   Problem Relation Age of Onset    Breast Cancer Mother     Cancer Mother         breast and uterine  39    Heart Disease Father     Heart Attack Father          28   [address] [facility] Maternal Grandmother     Cancer Brother 46        bronchogenic adenocarcinoma cancer    Lung Cancer Brother     Cancer Sister         lung    Lung Cancer Sister          72    Breast Cancer Sister          62    Cancer Sister         breast           Orders Placed This Encounter   Procedures    XR KNEE LEFT (1-2 VIEWS)     Standing Status:   Future     Number of Occurrences:   1     Standing Expiration Date:   2019    XR FOOT RIGHT (2 VIEWS)     Standing Status:   Future     Number of Occurrences:   1     Standing Expiration Date:   2019    [address] - Metatarsal       1. Chronic pain of left knee    2. History of total knee replacement, bilateral    3. Right foot injury, initial encounter        Lisa Alvarez MD    Please note that this chart was generated using voice recognition Dragon dictation software. Although every effort was made to ensure the accuracy of this automated transcription, some errors in transcription may have occurred.

## 2018-10-03 PROBLEM — F41.9 ANXIETY: Status: ACTIVE | Noted: 2018-10-03

## 2018-10-03 PROBLEM — R33.9 URINARY RETENTION: Status: ACTIVE | Noted: 2017-10-02

## 2018-10-03 PROBLEM — M19.90 ARTHRITIS: Status: ACTIVE | Noted: 2018-10-03

## 2018-10-08 ENCOUNTER — TELEPHONE (OUTPATIENT)
Dept: GASTROENTEROLOGY | Age: 78
End: 2018-10-08

## 2018-10-09 ENCOUNTER — OFFICE VISIT (OUTPATIENT)
Dept: GASTROENTEROLOGY | Age: 78
End: 2018-10-09
Payer: MEDICARE

## 2018-10-09 VITALS
HEART RATE: 88 BPM | BODY MASS INDEX: 27.69 KG/M2 | WEIGHT: 137.1 LBS | DIASTOLIC BLOOD PRESSURE: 86 MMHG | SYSTOLIC BLOOD PRESSURE: 134 MMHG

## 2018-10-09 DIAGNOSIS — R63.4 WEIGHT LOSS: Primary | ICD-10-CM

## 2018-10-09 DIAGNOSIS — R11.0 NAUSEA: ICD-10-CM

## 2018-10-09 DIAGNOSIS — F41.9 ANXIETY: ICD-10-CM

## 2018-10-09 DIAGNOSIS — R10.13 ABDOMINAL PAIN, EPIGASTRIC: ICD-10-CM

## 2018-10-09 DIAGNOSIS — R19.7 DIARRHEA, UNSPECIFIED TYPE: ICD-10-CM

## 2018-10-09 PROCEDURE — 1036F TOBACCO NON-USER: CPT | Performed by: INTERNAL MEDICINE

## 2018-10-09 PROCEDURE — G8417 CALC BMI ABV UP PARAM F/U: HCPCS | Performed by: INTERNAL MEDICINE

## 2018-10-09 PROCEDURE — 99214 OFFICE O/P EST MOD 30 MIN: CPT | Performed by: INTERNAL MEDICINE

## 2018-10-09 PROCEDURE — G8484 FLU IMMUNIZE NO ADMIN: HCPCS | Performed by: INTERNAL MEDICINE

## 2018-10-09 PROCEDURE — G8400 PT W/DXA NO RESULTS DOC: HCPCS | Performed by: INTERNAL MEDICINE

## 2018-10-09 PROCEDURE — 1090F PRES/ABSN URINE INCON ASSESS: CPT | Performed by: INTERNAL MEDICINE

## 2018-10-09 PROCEDURE — G8598 ASA/ANTIPLAT THER USED: HCPCS | Performed by: INTERNAL MEDICINE

## 2018-10-09 PROCEDURE — G8427 DOCREV CUR MEDS BY ELIG CLIN: HCPCS | Performed by: INTERNAL MEDICINE

## 2018-10-09 PROCEDURE — 1101F PT FALLS ASSESS-DOCD LE1/YR: CPT | Performed by: INTERNAL MEDICINE

## 2018-10-09 PROCEDURE — 1123F ACP DISCUSS/DSCN MKR DOCD: CPT | Performed by: INTERNAL MEDICINE

## 2018-10-09 PROCEDURE — 4040F PNEUMOC VAC/ADMIN/RCVD: CPT | Performed by: INTERNAL MEDICINE

## 2018-10-09 RX ORDER — ONDANSETRON 4 MG/1
4 TABLET, FILM COATED ORAL EVERY 8 HOURS PRN
Qty: 30 TABLET | Refills: 2 | Status: SHIPPED | OUTPATIENT
Start: 2018-10-09 | End: 2020-01-24 | Stop reason: SDUPTHER

## 2018-10-09 ASSESSMENT — ENCOUNTER SYMPTOMS
VOMITING: 0
BLOOD IN STOOL: 0
RECTAL PAIN: 0
SINUS PRESSURE: 1
ABDOMINAL DISTENTION: 0
ABDOMINAL PAIN: 1
COUGH: 1
SHORTNESS OF BREATH: 0
NAUSEA: 1
ANAL BLEEDING: 0
BACK PAIN: 1
CONSTIPATION: 0
DIARRHEA: 1
ALLERGIC/IMMUNOLOGIC NEGATIVE: 1

## 2018-10-12 ENCOUNTER — HOSPITAL ENCOUNTER (OUTPATIENT)
Age: 78
Discharge: HOME | End: 2018-10-12
Payer: MEDICARE

## 2018-10-12 ENCOUNTER — HOSPITAL ENCOUNTER (OUTPATIENT)
Dept: ULTRASOUND IMAGING | Age: 78
Discharge: HOME | End: 2018-10-14
Payer: MEDICARE

## 2018-10-12 ENCOUNTER — HOSPITAL ENCOUNTER (OUTPATIENT)
Dept: CT IMAGING | Age: 78
Discharge: HOME | End: 2018-10-14
Payer: MEDICARE

## 2018-10-12 ENCOUNTER — HOSPITAL ENCOUNTER (OUTPATIENT)
Dept: NUCLEAR MEDICINE | Age: 78
Discharge: HOME | End: 2018-10-14
Payer: MEDICARE

## 2018-10-12 DIAGNOSIS — F41.9 ANXIETY: ICD-10-CM

## 2018-10-12 DIAGNOSIS — R11.0 NAUSEA: ICD-10-CM

## 2018-10-12 DIAGNOSIS — R19.7 DIARRHEA, UNSPECIFIED TYPE: ICD-10-CM

## 2018-10-12 DIAGNOSIS — R10.13 ABDOMINAL PAIN, EPIGASTRIC: ICD-10-CM

## 2018-10-12 DIAGNOSIS — R63.4 WEIGHT LOSS: ICD-10-CM

## 2018-10-12 LAB
BUN BLDV-MCNC: 25 MG/DL (ref 8–23)
CREAT SERPL-MCNC: 0.76 MG/DL (ref 0.5–0.9)
GFR AFRICAN AMERICAN: >60 ML/MIN
GFR NON-AFRICAN AMERICAN: >60 ML/MIN
GFR SERPL CREATININE-BSD FRML MDRD: ABNORMAL ML/MIN/{1.73_M2}
GFR SERPL CREATININE-BSD FRML MDRD: ABNORMAL ML/MIN/{1.73_M2}

## 2018-10-12 PROCEDURE — 82565 ASSAY OF CREATININE: CPT

## 2018-10-12 PROCEDURE — 74176 CT ABD & PELVIS W/O CONTRAST: CPT

## 2018-10-12 PROCEDURE — A9537 TC99M MEBROFENIN: HCPCS | Performed by: INTERNAL MEDICINE

## 2018-10-12 PROCEDURE — 6360000004 HC RX CONTRAST MEDICATION: Performed by: INTERNAL MEDICINE

## 2018-10-12 PROCEDURE — 3430000000 HC RX DIAGNOSTIC RADIOPHARMACEUTICAL: Performed by: INTERNAL MEDICINE

## 2018-10-12 PROCEDURE — 36415 COLL VENOUS BLD VENIPUNCTURE: CPT

## 2018-10-12 PROCEDURE — 84520 ASSAY OF UREA NITROGEN: CPT

## 2018-10-12 PROCEDURE — 2580000003 HC RX 258: Performed by: INTERNAL MEDICINE

## 2018-10-12 PROCEDURE — 76705 ECHO EXAM OF ABDOMEN: CPT

## 2018-10-12 PROCEDURE — 78226 HEPATOBILIARY SYSTEM IMAGING: CPT

## 2018-10-12 RX ORDER — SODIUM CHLORIDE 0.9 % (FLUSH) 0.9 %
10 SYRINGE (ML) INJECTION PRN
Status: DISCONTINUED | OUTPATIENT
Start: 2018-10-12 | End: 2018-10-12

## 2018-10-12 RX ORDER — SODIUM CHLORIDE 0.9 % (FLUSH) 0.9 %
10 SYRINGE (ML) INJECTION PRN
Status: DISCONTINUED | OUTPATIENT
Start: 2018-10-12 | End: 2018-10-15 | Stop reason: HOSPADM

## 2018-10-12 RX ORDER — 0.9 % SODIUM CHLORIDE 0.9 %
80 INTRAVENOUS SOLUTION INTRAVENOUS ONCE
Status: DISCONTINUED | OUTPATIENT
Start: 2018-10-12 | End: 2018-10-12

## 2018-10-12 RX ADMIN — IOHEXOL 50 ML: 240 INJECTION, SOLUTION INTRATHECAL; INTRAVASCULAR; INTRAVENOUS; ORAL at 15:25

## 2018-10-12 RX ADMIN — MEBROFENIN 3.5 MILLICURIE: 45 INJECTION, POWDER, LYOPHILIZED, FOR SOLUTION INTRAVENOUS at 12:11

## 2018-10-12 RX ADMIN — Medication 10 ML: at 12:11

## 2018-10-16 ENCOUNTER — TELEPHONE (OUTPATIENT)
Dept: GASTROENTEROLOGY | Age: 78
End: 2018-10-16

## 2018-10-16 RX ORDER — RANITIDINE 300 MG/1
TABLET ORAL
Qty: 60 TABLET | Refills: 0 | Status: SHIPPED | OUTPATIENT
Start: 2018-10-16 | End: 2018-11-18 | Stop reason: SDUPTHER

## 2018-10-17 ENCOUNTER — HOSPITAL ENCOUNTER (OUTPATIENT)
Age: 78
Discharge: HOME | End: 2018-10-19
Payer: MEDICARE

## 2018-10-17 ENCOUNTER — HOSPITAL ENCOUNTER (OUTPATIENT)
Age: 78
Discharge: HOME | End: 2018-10-17
Payer: MEDICARE

## 2018-10-17 ENCOUNTER — HOSPITAL ENCOUNTER (OUTPATIENT)
Dept: GENERAL RADIOLOGY | Age: 78
Discharge: HOME | End: 2018-10-19
Payer: MEDICARE

## 2018-10-17 DIAGNOSIS — K31.84 DIABETIC GASTROPARESIS (HCC): ICD-10-CM

## 2018-10-17 DIAGNOSIS — E78.00 PURE HYPERCHOLESTEROLEMIA: ICD-10-CM

## 2018-10-17 DIAGNOSIS — R06.02 SOB (SHORTNESS OF BREATH): ICD-10-CM

## 2018-10-17 DIAGNOSIS — E11.42 DIABETIC PERIPHERAL NEUROPATHY (HCC): ICD-10-CM

## 2018-10-17 DIAGNOSIS — E55.9 VITAMIN D DEFICIENCY: ICD-10-CM

## 2018-10-17 DIAGNOSIS — D64.9 ANEMIA, UNSPECIFIED TYPE: ICD-10-CM

## 2018-10-17 DIAGNOSIS — E53.8 B12 DEFICIENCY: ICD-10-CM

## 2018-10-17 DIAGNOSIS — I10 ESSENTIAL HYPERTENSION: ICD-10-CM

## 2018-10-17 DIAGNOSIS — E11.43 DIABETIC GASTROPARESIS (HCC): ICD-10-CM

## 2018-10-17 DIAGNOSIS — E11.8 TYPE 2 DIABETES MELLITUS WITH COMPLICATION, WITHOUT LONG-TERM CURRENT USE OF INSULIN (HCC): ICD-10-CM

## 2018-10-17 DIAGNOSIS — D69.3 CHRONIC ITP (IDIOPATHIC THROMBOCYTOPENIA) (HCC): ICD-10-CM

## 2018-10-17 LAB
ALBUMIN SERPL-MCNC: 4.3 G/DL (ref 3.5–5.2)
ALBUMIN/GLOBULIN RATIO: ABNORMAL (ref 1–2.5)
ALP BLD-CCNC: 50 U/L (ref 35–104)
ALT SERPL-CCNC: 9 U/L (ref 5–33)
ANION GAP SERPL CALCULATED.3IONS-SCNC: 13 MMOL/L (ref 9–17)
AST SERPL-CCNC: 14 U/L
BILIRUB SERPL-MCNC: 0.34 MG/DL (ref 0.3–1.2)
BUN BLDV-MCNC: 20 MG/DL (ref 8–23)
BUN/CREAT BLD: ABNORMAL (ref 9–20)
CALCIUM SERPL-MCNC: 9.7 MG/DL (ref 8.6–10.4)
CHLORIDE BLD-SCNC: 105 MMOL/L (ref 98–107)
CHOLESTEROL/HDL RATIO: 4.5
CHOLESTEROL: 210 MG/DL
CO2: 22 MMOL/L (ref 20–31)
CREAT SERPL-MCNC: 0.81 MG/DL (ref 0.5–0.9)
GFR AFRICAN AMERICAN: >60 ML/MIN
GFR NON-AFRICAN AMERICAN: >60 ML/MIN
GFR SERPL CREATININE-BSD FRML MDRD: ABNORMAL ML/MIN/{1.73_M2}
GFR SERPL CREATININE-BSD FRML MDRD: ABNORMAL ML/MIN/{1.73_M2}
GLUCOSE BLD-MCNC: 103 MG/DL (ref 70–99)
HCT VFR BLD CALC: 36.7 % (ref 36–46)
HDLC SERPL-MCNC: 47 MG/DL
HEMOGLOBIN: 11.6 G/DL (ref 12–16)
LDL CHOLESTEROL: 137 MG/DL (ref 0–130)
MCH RBC QN AUTO: 26.1 PG (ref 26–34)
MCHC RBC AUTO-ENTMCNC: 31.7 G/DL (ref 31–37)
MCV RBC AUTO: 82.3 FL (ref 80–100)
NRBC AUTOMATED: ABNORMAL PER 100 WBC
PDW BLD-RTO: 16.8 % (ref 11.5–14.9)
PLATELET # BLD: 110 K/UL (ref 150–450)
PMV BLD AUTO: 12.5 FL (ref 6–12)
POTASSIUM SERPL-SCNC: 4.4 MMOL/L (ref 3.7–5.3)
RBC # BLD: 4.46 M/UL (ref 4–5.2)
SODIUM BLD-SCNC: 140 MMOL/L (ref 135–144)
TOTAL PROTEIN: 6.7 G/DL (ref 6.4–8.3)
TRIGL SERPL-MCNC: 130 MG/DL
VITAMIN B-12: 283 PG/ML (ref 232–1245)
VITAMIN D 25-HYDROXY: 35.1 NG/ML (ref 30–100)
VLDLC SERPL CALC-MCNC: ABNORMAL MG/DL (ref 1–30)
WBC # BLD: 5.2 K/UL (ref 3.5–11)

## 2018-10-17 PROCEDURE — 71046 X-RAY EXAM CHEST 2 VIEWS: CPT

## 2018-10-17 PROCEDURE — 82607 VITAMIN B-12: CPT

## 2018-10-17 PROCEDURE — 80061 LIPID PANEL: CPT

## 2018-10-17 PROCEDURE — 82306 VITAMIN D 25 HYDROXY: CPT

## 2018-10-17 PROCEDURE — 36415 COLL VENOUS BLD VENIPUNCTURE: CPT

## 2018-10-17 PROCEDURE — 85027 COMPLETE CBC AUTOMATED: CPT

## 2018-10-17 PROCEDURE — 80053 COMPREHEN METABOLIC PANEL: CPT

## 2018-10-18 ENCOUNTER — HOSPITAL ENCOUNTER (OUTPATIENT)
Dept: VASCULAR LAB | Age: 78
Discharge: HOME | End: 2018-10-18
Payer: MEDICARE

## 2018-10-18 PROCEDURE — 93925 LOWER EXTREMITY STUDY: CPT

## 2018-10-30 ENCOUNTER — OFFICE VISIT (OUTPATIENT)
Dept: GASTROENTEROLOGY | Age: 78
End: 2018-10-30
Payer: MEDICARE

## 2018-10-30 VITALS
BODY MASS INDEX: 27.97 KG/M2 | HEART RATE: 99 BPM | SYSTOLIC BLOOD PRESSURE: 137 MMHG | DIASTOLIC BLOOD PRESSURE: 78 MMHG | WEIGHT: 138.5 LBS

## 2018-10-30 DIAGNOSIS — R19.7 DIARRHEA, UNSPECIFIED TYPE: Primary | ICD-10-CM

## 2018-10-30 DIAGNOSIS — R11.0 NAUSEA: ICD-10-CM

## 2018-10-30 DIAGNOSIS — F41.9 ANXIETY: ICD-10-CM

## 2018-10-30 PROCEDURE — G8598 ASA/ANTIPLAT THER USED: HCPCS | Performed by: INTERNAL MEDICINE

## 2018-10-30 PROCEDURE — G8484 FLU IMMUNIZE NO ADMIN: HCPCS | Performed by: INTERNAL MEDICINE

## 2018-10-30 PROCEDURE — 1101F PT FALLS ASSESS-DOCD LE1/YR: CPT | Performed by: INTERNAL MEDICINE

## 2018-10-30 PROCEDURE — 1036F TOBACCO NON-USER: CPT | Performed by: INTERNAL MEDICINE

## 2018-10-30 PROCEDURE — G8417 CALC BMI ABV UP PARAM F/U: HCPCS | Performed by: INTERNAL MEDICINE

## 2018-10-30 PROCEDURE — G8400 PT W/DXA NO RESULTS DOC: HCPCS | Performed by: INTERNAL MEDICINE

## 2018-10-30 PROCEDURE — 1123F ACP DISCUSS/DSCN MKR DOCD: CPT | Performed by: INTERNAL MEDICINE

## 2018-10-30 PROCEDURE — 1090F PRES/ABSN URINE INCON ASSESS: CPT | Performed by: INTERNAL MEDICINE

## 2018-10-30 PROCEDURE — 4040F PNEUMOC VAC/ADMIN/RCVD: CPT | Performed by: INTERNAL MEDICINE

## 2018-10-30 PROCEDURE — 99214 OFFICE O/P EST MOD 30 MIN: CPT | Performed by: INTERNAL MEDICINE

## 2018-10-30 PROCEDURE — G8427 DOCREV CUR MEDS BY ELIG CLIN: HCPCS | Performed by: INTERNAL MEDICINE

## 2018-10-30 ASSESSMENT — ENCOUNTER SYMPTOMS
ALLERGIC/IMMUNOLOGIC NEGATIVE: 1
ABDOMINAL PAIN: 0
VOMITING: 1
DIARRHEA: 1
NAUSEA: 1
BLOOD IN STOOL: 0
ABDOMINAL DISTENTION: 0
SINUS PRESSURE: 1
BACK PAIN: 1
CONSTIPATION: 0
ANAL BLEEDING: 0
RECTAL PAIN: 0
SHORTNESS OF BREATH: 0
COUGH: 1

## 2018-10-30 NOTE — PROGRESS NOTES
agree  Objective:   Physical Exam   Constitutional: She is oriented to person, place, and time. She appears well-developed and well-nourished. Anxious    HENT:   Head: Normocephalic and atraumatic. Mouth/Throat: Oropharynx is clear and moist.   Eyes: Pupils are equal, round, and reactive to light. Conjunctivae are normal.   Neck: Normal range of motion. Neck supple. Cardiovascular: Normal heart sounds and intact distal pulses. Pulmonary/Chest: Effort normal and breath sounds normal.   Abdominal: Soft. Bowel sounds are normal.   Mild tender    Musculoskeletal: Normal range of motion. Neurological: She is alert and oriented to person, place, and time. Skin: Skin is warm. Psychiatric: Her behavior is normal.       Assessment:      As above      Plan:      Small frequent meals        Pt was given instructions and advice in detail about the symptom of constipation. She was explained about avoidance of fast food, soda pops, cheese and red meat. Was also told to avoid sedatives narcotics and pain killers if possible. Pt was advised to start drinking ample amount of water and liquid. Was told to adapt and follow an exercise regimen. Instructions were given to increase the amount of fiber including dietary in terms of bran, cereals, whole wheat, brown bread etc. Was also instructed to start using supplemental fiber either Metamucil, citrucell or bennafiber with ample liquids. She was told to start drinking prune juice which is good for constipation. If symptoms don't resolve she will require medicines to assist with her symptoms    Pt has verbalized understanding and agreement to this plan. Pt was advised in detail about some life style and dietary modifications. She was advised about avoidance of caffeine, nicotine and chocolate. Pt was also told to stay away from any kind of fast foods, soda pops.  She was also advised to avoid lots of spices, grease and fried food etc.     Instructions were

## 2018-11-19 RX ORDER — RANITIDINE 300 MG/1
TABLET ORAL
Qty: 60 TABLET | Refills: 0 | Status: SHIPPED | OUTPATIENT
Start: 2018-11-19 | End: 2019-01-14 | Stop reason: SDUPTHER

## 2018-11-29 ENCOUNTER — OFFICE VISIT (OUTPATIENT)
Dept: GASTROENTEROLOGY | Age: 78
End: 2018-11-29
Payer: MEDICARE

## 2018-11-29 VITALS
DIASTOLIC BLOOD PRESSURE: 85 MMHG | SYSTOLIC BLOOD PRESSURE: 142 MMHG | BODY MASS INDEX: 29.29 KG/M2 | HEART RATE: 91 BPM | WEIGHT: 145 LBS

## 2018-11-29 DIAGNOSIS — F41.9 ANXIETY: ICD-10-CM

## 2018-11-29 DIAGNOSIS — R19.7 DIARRHEA, UNSPECIFIED TYPE: ICD-10-CM

## 2018-11-29 DIAGNOSIS — K58.2 IRRITABLE BOWEL SYNDROME WITH BOTH CONSTIPATION AND DIARRHEA: ICD-10-CM

## 2018-11-29 DIAGNOSIS — K59.01 SLOW TRANSIT CONSTIPATION: Primary | ICD-10-CM

## 2018-11-29 PROCEDURE — 1123F ACP DISCUSS/DSCN MKR DOCD: CPT | Performed by: INTERNAL MEDICINE

## 2018-11-29 PROCEDURE — 1090F PRES/ABSN URINE INCON ASSESS: CPT | Performed by: INTERNAL MEDICINE

## 2018-11-29 PROCEDURE — G8598 ASA/ANTIPLAT THER USED: HCPCS | Performed by: INTERNAL MEDICINE

## 2018-11-29 PROCEDURE — G8417 CALC BMI ABV UP PARAM F/U: HCPCS | Performed by: INTERNAL MEDICINE

## 2018-11-29 PROCEDURE — 1036F TOBACCO NON-USER: CPT | Performed by: INTERNAL MEDICINE

## 2018-11-29 PROCEDURE — 4040F PNEUMOC VAC/ADMIN/RCVD: CPT | Performed by: INTERNAL MEDICINE

## 2018-11-29 PROCEDURE — 99214 OFFICE O/P EST MOD 30 MIN: CPT | Performed by: INTERNAL MEDICINE

## 2018-11-29 PROCEDURE — 1101F PT FALLS ASSESS-DOCD LE1/YR: CPT | Performed by: INTERNAL MEDICINE

## 2018-11-29 PROCEDURE — G8484 FLU IMMUNIZE NO ADMIN: HCPCS | Performed by: INTERNAL MEDICINE

## 2018-11-29 PROCEDURE — G8427 DOCREV CUR MEDS BY ELIG CLIN: HCPCS | Performed by: INTERNAL MEDICINE

## 2018-11-29 PROCEDURE — G8400 PT W/DXA NO RESULTS DOC: HCPCS | Performed by: INTERNAL MEDICINE

## 2018-11-29 RX ORDER — DEXLANSOPRAZOLE 60 MG/1
60 CAPSULE, DELAYED RELEASE ORAL DAILY
COMMUNITY
End: 2020-08-04 | Stop reason: SDUPTHER

## 2018-11-29 ASSESSMENT — ENCOUNTER SYMPTOMS
ABDOMINAL DISTENTION: 1
DIARRHEA: 1
SHORTNESS OF BREATH: 0
TROUBLE SWALLOWING: 0
CHOKING: 0
NAUSEA: 0
SINUS PRESSURE: 1
BLOOD IN STOOL: 0
COUGH: 1
CONSTIPATION: 0
CHEST TIGHTNESS: 0
ABDOMINAL PAIN: 1
ANAL BLEEDING: 0
BACK PAIN: 0
SINUS PAIN: 1
RECTAL PAIN: 1
VOMITING: 0

## 2019-01-14 RX ORDER — RANITIDINE 300 MG/1
TABLET ORAL
Qty: 60 TABLET | Refills: 3 | Status: SHIPPED | OUTPATIENT
Start: 2019-01-14 | End: 2019-12-16

## 2019-04-02 ENCOUNTER — HOSPITAL ENCOUNTER (EMERGENCY)
Age: 79
Discharge: HOME | End: 2019-04-02
Attending: EMERGENCY MEDICINE
Payer: MEDICARE

## 2019-04-02 ENCOUNTER — APPOINTMENT (OUTPATIENT)
Dept: GENERAL RADIOLOGY | Age: 79
End: 2019-04-02
Payer: MEDICARE

## 2019-04-02 ENCOUNTER — APPOINTMENT (OUTPATIENT)
Dept: CT IMAGING | Age: 79
End: 2019-04-02
Payer: MEDICARE

## 2019-04-02 VITALS
OXYGEN SATURATION: 98 % | TEMPERATURE: 98.4 F | RESPIRATION RATE: 18 BRPM | HEART RATE: 72 BPM | BODY MASS INDEX: 28.83 KG/M2 | WEIGHT: 143 LBS | SYSTOLIC BLOOD PRESSURE: 139 MMHG | DIASTOLIC BLOOD PRESSURE: 75 MMHG | HEIGHT: 59 IN
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
## 2019-04-02 DIAGNOSIS — M79.604 RIGHT LEG PAIN: ICD-10-CM

## 2019-04-02 DIAGNOSIS — M79.601 RIGHT ARM PAIN: ICD-10-CM

## 2019-04-02 DIAGNOSIS — R07.81 RIB PAIN ON RIGHT SIDE: ICD-10-CM

## 2019-04-02 DIAGNOSIS — M25.551 RIGHT HIP PAIN: ICD-10-CM

## 2019-04-02 PROCEDURE — 73590 X-RAY EXAM OF LOWER LEG: CPT

## 2019-04-02 PROCEDURE — 71101 X-RAY EXAM UNILAT RIBS/CHEST: CPT

## 2019-04-02 PROCEDURE — 73502 X-RAY EXAM HIP UNI 2-3 VIEWS: CPT

## 2019-04-02 PROCEDURE — 99284 EMERGENCY DEPT VISIT MOD MDM: CPT

## 2019-04-02 PROCEDURE — 73610 X-RAY EXAM OF ANKLE: CPT

## 2019-04-02 PROCEDURE — 72125 CT NECK SPINE W/O DYE: CPT

## 2019-04-02 PROCEDURE — 73130 X-RAY EXAM OF HAND: CPT

## 2019-04-02 PROCEDURE — 73080 X-RAY EXAM OF ELBOW: CPT

## 2019-04-02 PROCEDURE — 70450 CT HEAD/BRAIN W/O DYE: CPT

## 2019-04-02 PROCEDURE — 72100 X-RAY EXAM L-S SPINE 2/3 VWS: CPT

## 2019-04-02 PROCEDURE — 73552 X-RAY EXAM OF FEMUR 2/>: CPT

## 2019-04-02 PROCEDURE — 73060 X-RAY EXAM OF HUMERUS: CPT

## 2019-04-02 PROCEDURE — 6370000000 HC RX 637 (ALT 250 FOR IP): Performed by: PHYSICIAN ASSISTANT

## 2019-04-02 PROCEDURE — 73030 X-RAY EXAM OF SHOULDER: CPT

## 2019-04-02 RX ORDER — TRAMADOL HYDROCHLORIDE 50 MG/1
50 TABLET ORAL EVERY 4 HOURS PRN
Qty: 12 TABLET | Refills: 0 | Status: SHIPPED | OUTPATIENT
Start: 2019-04-02 | End: 2019-04-05

## 2019-04-02 RX ORDER — TRAMADOL HYDROCHLORIDE 50 MG/1
50 TABLET ORAL ONCE
Status: COMPLETED | OUTPATIENT
Start: 2019-04-02 | End: 2019-04-02

## 2019-04-02 RX ADMIN — TRAMADOL HYDROCHLORIDE 50 MG: 50 TABLET, FILM COATED ORAL at 19:15

## 2019-04-02 ASSESSMENT — PAIN SCALES - GENERAL
PAINLEVEL_OUTOF10: 9
PAINLEVEL_OUTOF10: 7

## 2019-04-02 NOTE — ED PROVIDER NOTES
[address] ED  eMERGENCY dEPARTMENT eNCOUnter      Pt Name: Osorio Callahan  MRN: [id number]  [place] 1940  Date of evaluation: 4/2/19      CHIEF COMPLAINT:   Chief Complaint   Patient presents with   [facility] 69 y.[facility] female who presents with complaints of right sided pain after a fall. Pt states she tripped while walking through her house and fell landing on her left side. Admits to hitting the back of her head on the floor of her mobile home. She denies loc or emesis. States she is having some pain in the back of her head and upper neck. Admits to associated right shoulder, elbow, hand, hip, and leg pain. Pt does have some right sided rib pain. Admits to some tingling in her right fingers. She denies lightheadedness, dizziness, sob, nausea, vomiting, abd pain, numbness, back pain, loss of bowel or bladder control. Pt is not on blood thinners. No other complaints. REVIEW OF SYSTEMS     Fall  Headache, neck pain, shoulder pain, elbow pain, hand pain, hip pain, knee pain  Tingling     Negative in 10 essential Systems except as mentioned above and in the HPI.       PAST MEDICAL HISTORY   PMH:  has a past medical history of Age-related cognitive decline, Ankle pain, Anxiety, B12 deficiency, Winters esophagus, 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HCC), Caffeine use, Chronic back pain, Chronic ITP (idiopathic thrombocytopenia) (HCC), CVA (cerebral infarction), Diabetic gastroparesis ([address] Diverticular disease, GERD (gastroesophageal reflux disease), Hiatal hernia, Hip fracture ([address] History of decreased platelet count, Hyperlipemia, Hypertension, Internal hemorrhoid, Macular degeneration of left eye, Nausea and vomiting, Neuropathy, Osteoarthritis, Ringing in ears, Self-catheterizes urinary bladder, TIA (transient ischemic attack), Tubular adenoma, Type II or unspecified type diabetes mellitus without mention of complication, not stated as uncontrolled, Urinary incontinence, Wears dentures, and Wears glasses. none otherwise stated from nurses notes  Surgical History:  has a past surgical history that includes bladder suspension (2002); Hysterectomy (1969); Tonsillectomy (1978); Appendectomy (1962); Spine surgery (2010); colostomy (1986); Revision Colostomy (1986); Spine surgery (1990); Upper gastrointestinal endoscopy; Bladder surgery; Upper gastrointestinal endoscopy (05-05-14); cardiovascular stress test (12/2014); Colon surgery; eunice and bso (cervix removed); fracture surgery (Right, 2011); fracture surgery (Left, 2001); fracture surgery (Left, 2013); Cardiac catheterization (2008); Revision total knee arthroplasty (Right, 10/27/15); Colonoscopy (4/7/16); Upper gastrointestinal endoscopy (04/07/2016); Tonsillectomy; lumbar fusion (2017); Cystocopy (09/08/2017); Cataract removal with implant (Left, 11/07/2017); pr remv cataract extracap,insert lens (Left, 11/7/2017); Cataract removal with implant (Right, 11/28/2017); pr remv cataract extracap,insert lens (Right, 11/28/2017); Upper gastrointestinal endoscopy (N/A, 7/17/2018); and joint replacement (Bilateral, 2000). none otherwise stated from nurses notes  Social History:  reports that she quit smoking about 36 years ago. She has a 10.00 pack-year smoking history. She quit smokeless tobacco use about 36 years ago. She reports that she does not drink alcohol or use drugs. , lives at home with others  Family History: none  Psychiatric History: none    Allergies:is allergic to contrast [iodides]; povidone-iodine; sulfa antibiotics; cephalexin; cymbalta [duloxetine hcl]; doxycycline; meperidine; morphine; pcn [penicillins]; zithromax [azithromycin]; betadine [povidone iodine]; cephalexin; cozaar [losartan]; demerol; iodine; lisinopril; and toviaz [fesoterodine fumarate er].     PHYSICAL EXAM     INITIAL VITALS: /75   Pulse 72   Temp 98.4 °F (36.9 °C) (Oral)   Resp 18   Ht 4' 11\" (1.499 m)   Wt 143 lb (64.9 kg)   SpO2 98%   BMI 28.88 kg/m²   Physical Exam   Constitutional: She is oriented to person, place, and time. She appears well-developed and well-nourished. No distress. Pt lying in bed. Well appearing. Pleasant. HENT:   Head: Normocephalic and atraumatic. Not macrocephalic and not microcephalic. Head is without raccoon's eyes, without Andre's sign, without abrasion, without contusion, without laceration, without right periorbital erythema and without left periorbital erythema. Hair is normal.   Right Ear: External ear normal.   Left Ear: External ear normal.   Nose: Nose normal.   Mouth/Throat: Oropharynx is clear and moist. No oropharyngeal exudate. Eyes: Pupils are equal, round, and reactive to light. Conjunctivae and EOM are normal.   Neck: Normal range of motion and full passive range of motion without pain. Neck supple. Muscular tenderness (right sided ) present. No spinous process tenderness present. No neck rigidity. No edema, no erythema and normal range of motion present. Cardiovascular: Normal rate, regular rhythm, S1 normal, S2 normal and normal heart sounds. Exam reveals no gallop and no friction rub. No murmur heard. Pulmonary/Chest: Effort normal and breath sounds normal. No stridor. No respiratory distress. She has no decreased breath sounds. She has no wheezes. She has no rhonchi. She has no rales. She exhibits tenderness. Tenderness over right posterior ribs. No bruising, retractions, crepitus, flail chest.    Abdominal: Soft. Normal appearance. She exhibits no mass. There is no tenderness. There is no rebound and no guarding. Musculoskeletal: She exhibits tenderness. She exhibits no edema or deformity. Right shoulder: She exhibits bony tenderness, pain and decreased strength. She exhibits normal range of motion, no tenderness, no swelling, no effusion, no crepitus, no deformity, no laceration, no spasm and normal pulse.         Left shoulder: Normal. laceration and no swelling. Decreased sensation noted. Normal strength noted. Left hand: Normal.        Right upper leg: She exhibits tenderness and bony tenderness. She exhibits no swelling, no edema, no deformity and no laceration. Left upper leg: Normal.        Right lower leg: She exhibits bony tenderness. She exhibits no tenderness, no swelling, no edema, no deformity and no laceration. Left lower leg: Normal.        Right foot: Normal.        Left foot: Normal.   Neurological: She is alert and oriented to person, place, and time. She displays normal reflexes. No cranial nerve deficit or sensory deficit. She exhibits normal muscle tone. Coordination normal.   Skin: Skin is warm, dry and intact. Capillary refill takes less than 2 seconds. No abrasion, no bruising and no laceration noted. She is not diaphoretic. Nursing note and vitals reviewed. [place] Coma Scale*  Patient characteristics Points   Eyes open   Spontaneously 4   To speech 3   To pain 2   Never 1   Best verbal response   Oriented 5   Confused 4   Inappropriate words 3   Incomprehensible sounds 2   Silent 1   Best motor response   Obeys commands 6   Localizes pain 5   Flexion withdrawal 4   Decerebrate flexion 3   Decerebrate extension 2   No response 1   Total 15         EMERGENCY DEPARTMENT COURSE:     Orders Placed This Encounter   Medications    traMADol (ULTRAM) tablet 50 mg    traMADol (ULTRAM) 50 MG tablet     Sig: Take 1 tablet by mouth every 4 hours as needed for Pain for up to 3 days. Intended supply: 3 days. Take lowest dose possible to manage pain     Dispense:  12 tablet     Refill:  0      pt fell after tripping. Landed on right side and fell backwards. Did hit back of head. No loc or emesis. Pt is not on blood thinners. No neuro deficits, bruising, swelling, abrasions on exam.   Imaging was unremarkable for acute changes. Suspect sprains/ contusions. Will dc home with tramadol.    Follow up with Dr. Aric Karimi. Head injury instructions discussed with patient. Discussed results and plan with the pt. They expressed appropriate understanding. Pt given close follow up, supportive care instructions and strict return instructions at the bedside. Differential diagnosis includes but is not limited to subarachnoid hemorrhage, subdural hemorrhage, skull fracture, concussion, contusion      The patient has been instructed to return if the symptoms worsen or change in any way. The patient verbalized understanding. FINAL IMPRESSION:     1. Fall, initial encounter    2. Right arm pain    3. Right hip pain    4. Right leg pain    5. Rib pain on right side          DISPOSITION:  DISPOSITION       PATIENT REFERRED TO:  Carlos Gaytan MD  [address] [place] [address]  [phone]    Call       [facility] ED  [facility] [address]  [address] [place] [address]  [phone]    If symptoms worsen      DISCHARGE MEDICATIONS:  Discharge Medication List as of 4/2/2019  7:54 PM      START taking these medications    Details   traMADol (ULTRAM) 50 MG tablet Take 1 tablet by mouth every 4 hours as needed for Pain for up to 3 days. Intended supply: 3 days.  Take lowest dose possible to manage pain, Disp-12 tablet, R-0Print             (Please note that portions of this note were completed with a voice recognition program.  Efforts were made to edit the dictations but occasionally words are mis-transcribed.)        Olivia Seth PA-C  04/02/19 2501

## 2019-04-03 NOTE — ED PROVIDER NOTES
[address] ED  eMERGENCY dEPARTMENT eNCOUnter   Independent Attestation     Pt Name: Nishant Fitzpatrick  MRN: [id number]  [place] 1940  Date of evaluation: 4/3/19     Nishant Fitzpatrick is a 69 y.o. female with CC: Fall      Based on the medical record the care appears appropriate. I was personally available for consultation in the Emergency Department.     Halima Cullen MD  Attending Emergency Physician                    Halima Cullen MD  04/03/19 0079

## 2019-05-01 PROBLEM — J20.9 ACUTE BRONCHITIS: Status: ACTIVE | Noted: 2019-05-01

## 2019-08-09 ENCOUNTER — HOSPITAL ENCOUNTER (OUTPATIENT)
Age: 79
Setting detail: SPECIMEN
Discharge: HOME | End: 2019-08-09
Payer: MEDICARE

## 2019-08-09 LAB
ALBUMIN SERPL-MCNC: 4.6 G/DL (ref 3.5–5.2)
ALBUMIN/GLOBULIN RATIO: 2.1 (ref 1–2.5)
ALP BLD-CCNC: 62 U/L (ref 35–104)
ALT SERPL-CCNC: 14 U/L (ref 5–33)
ANION GAP SERPL CALCULATED.3IONS-SCNC: 19 MMOL/L (ref 9–17)
AST SERPL-CCNC: 18 U/L
BILIRUB SERPL-MCNC: 0.34 MG/DL (ref 0.3–1.2)
BUN BLDV-MCNC: 18 MG/DL (ref 8–23)
BUN/CREAT BLD: ABNORMAL (ref 9–20)
CALCIUM SERPL-MCNC: 9.5 MG/DL (ref 8.6–10.4)
CHLORIDE BLD-SCNC: 107 MMOL/L (ref 98–107)
CO2: 18 MMOL/L (ref 20–31)
CREAT SERPL-MCNC: 0.76 MG/DL (ref 0.5–0.9)
CREATININE URINE: 63.3 MG/DL (ref 28–217)
ESTIMATED AVERAGE GLUCOSE: 163 MG/DL
GFR AFRICAN AMERICAN: >60 ML/MIN
GFR NON-AFRICAN AMERICAN: >60 ML/MIN
GFR SERPL CREATININE-BSD FRML MDRD: ABNORMAL ML/MIN/{1.73_M2}
GFR SERPL CREATININE-BSD FRML MDRD: ABNORMAL ML/MIN/{1.73_M2}
GLUCOSE BLD-MCNC: 125 MG/DL (ref 70–99)
HBA1C MFR BLD: 7.3 % (ref 4–6)
MICROALBUMIN/CREAT 24H UR: <12 MG/L
MICROALBUMIN/CREAT UR-RTO: NORMAL MCG/MG CREAT
POTASSIUM SERPL-SCNC: 4.3 MMOL/L (ref 3.7–5.3)
SODIUM BLD-SCNC: 144 MMOL/L (ref 135–144)
TOTAL PROTEIN: 6.8 G/DL (ref 6.4–8.3)

## 2019-08-28 DIAGNOSIS — K21.9 GASTROESOPHAGEAL REFLUX DISEASE WITHOUT ESOPHAGITIS: ICD-10-CM

## 2019-08-28 DIAGNOSIS — R11.0 NAUSEA: ICD-10-CM

## 2019-08-28 DIAGNOSIS — F41.9 ANXIETY: ICD-10-CM

## 2019-10-08 ENCOUNTER — OFFICE VISIT (OUTPATIENT)
Dept: PODIATRY | Age: 79
End: 2019-10-08
Payer: MEDICARE

## 2019-10-08 VITALS — HEIGHT: 59 IN | WEIGHT: 149 LBS | BODY MASS INDEX: 30.04 KG/M2

## 2019-10-08 DIAGNOSIS — M19.072 PRIMARY OSTEOARTHRITIS OF LEFT FOOT: Primary | ICD-10-CM

## 2019-10-08 DIAGNOSIS — G57.62 NEUROMA OF SECOND INTERSPACE OF LEFT FOOT: ICD-10-CM

## 2019-10-08 DIAGNOSIS — R60.0 EDEMA OF LOWER EXTREMITY: ICD-10-CM

## 2019-10-08 DIAGNOSIS — M79.672 PAIN IN LEFT FOOT: ICD-10-CM

## 2019-10-08 PROCEDURE — 99203 OFFICE O/P NEW LOW 30 MIN: CPT | Performed by: PODIATRIST

## 2019-10-08 PROCEDURE — G8400 PT W/DXA NO RESULTS DOC: HCPCS | Performed by: PODIATRIST

## 2019-10-08 PROCEDURE — G8427 DOCREV CUR MEDS BY ELIG CLIN: HCPCS | Performed by: PODIATRIST

## 2019-10-08 PROCEDURE — 1123F ACP DISCUSS/DSCN MKR DOCD: CPT | Performed by: PODIATRIST

## 2019-10-08 PROCEDURE — 64455 NJX AA&/STRD PLTR COM DG NRV: CPT | Performed by: PODIATRIST

## 2019-10-08 PROCEDURE — G8417 CALC BMI ABV UP PARAM F/U: HCPCS | Performed by: PODIATRIST

## 2019-10-08 PROCEDURE — 4040F PNEUMOC VAC/ADMIN/RCVD: CPT | Performed by: PODIATRIST

## 2019-10-08 PROCEDURE — 1090F PRES/ABSN URINE INCON ASSESS: CPT | Performed by: PODIATRIST

## 2019-10-08 PROCEDURE — G8484 FLU IMMUNIZE NO ADMIN: HCPCS | Performed by: PODIATRIST

## 2019-10-08 PROCEDURE — 1036F TOBACCO NON-USER: CPT | Performed by: PODIATRIST

## 2019-10-08 RX ORDER — BUPIVACAINE HYDROCHLORIDE 5 MG/ML
1 INJECTION, SOLUTION PERINEURAL ONCE
Status: COMPLETED | OUTPATIENT
Start: 2019-10-08 | End: 2019-10-08

## 2019-10-08 RX ORDER — PREGABALIN 100 MG/1
CAPSULE ORAL
Refills: 2 | COMMUNITY
Start: 2019-09-30 | End: 2019-11-11 | Stop reason: ALTCHOICE

## 2019-10-08 RX ADMIN — BUPIVACAINE HYDROCHLORIDE 5 MG: 5 INJECTION, SOLUTION PERINEURAL at 13:45

## 2019-10-08 ASSESSMENT — ENCOUNTER SYMPTOMS
COLOR CHANGE: 0
DIARRHEA: 0
NAUSEA: 0
SHORTNESS OF BREATH: 0
BACK PAIN: 0

## 2019-10-21 ENCOUNTER — OFFICE VISIT (OUTPATIENT)
Dept: PODIATRY | Age: 79
End: 2019-10-21
Payer: MEDICARE

## 2019-10-21 VITALS — HEIGHT: 59 IN | BODY MASS INDEX: 30.04 KG/M2 | WEIGHT: 149 LBS

## 2019-10-21 DIAGNOSIS — M79.672 PAIN IN LEFT FOOT: ICD-10-CM

## 2019-10-21 DIAGNOSIS — R60.0 EDEMA OF LOWER EXTREMITY: ICD-10-CM

## 2019-10-21 DIAGNOSIS — M19.072 PRIMARY OSTEOARTHRITIS OF LEFT FOOT: Primary | ICD-10-CM

## 2019-10-21 DIAGNOSIS — G57.62 NEUROMA OF SECOND INTERSPACE OF LEFT FOOT: ICD-10-CM

## 2019-10-21 PROCEDURE — 1090F PRES/ABSN URINE INCON ASSESS: CPT | Performed by: PODIATRIST

## 2019-10-21 PROCEDURE — G8417 CALC BMI ABV UP PARAM F/U: HCPCS | Performed by: PODIATRIST

## 2019-10-21 PROCEDURE — 4040F PNEUMOC VAC/ADMIN/RCVD: CPT | Performed by: PODIATRIST

## 2019-10-21 PROCEDURE — G8427 DOCREV CUR MEDS BY ELIG CLIN: HCPCS | Performed by: PODIATRIST

## 2019-10-21 PROCEDURE — 1123F ACP DISCUSS/DSCN MKR DOCD: CPT | Performed by: PODIATRIST

## 2019-10-21 PROCEDURE — G8400 PT W/DXA NO RESULTS DOC: HCPCS | Performed by: PODIATRIST

## 2019-10-21 PROCEDURE — 99213 OFFICE O/P EST LOW 20 MIN: CPT | Performed by: PODIATRIST

## 2019-10-21 PROCEDURE — 64455 NJX AA&/STRD PLTR COM DG NRV: CPT | Performed by: PODIATRIST

## 2019-10-21 PROCEDURE — G8484 FLU IMMUNIZE NO ADMIN: HCPCS | Performed by: PODIATRIST

## 2019-10-21 PROCEDURE — 1036F TOBACCO NON-USER: CPT | Performed by: PODIATRIST

## 2019-10-21 RX ORDER — BUPIVACAINE HYDROCHLORIDE 5 MG/ML
1 INJECTION, SOLUTION PERINEURAL ONCE
Status: COMPLETED | OUTPATIENT
Start: 2019-10-21 | End: 2019-10-21

## 2019-10-21 RX ORDER — CELECOXIB 200 MG/1
200 CAPSULE ORAL
COMMUNITY
Start: 2019-10-16 | End: 2019-10-21 | Stop reason: SINTOL

## 2019-10-21 RX ADMIN — BUPIVACAINE HYDROCHLORIDE 5 MG: 5 INJECTION, SOLUTION PERINEURAL at 14:39

## 2019-10-23 ASSESSMENT — ENCOUNTER SYMPTOMS
NAUSEA: 0
DIARRHEA: 0
COLOR CHANGE: 0
SHORTNESS OF BREATH: 0
BACK PAIN: 0

## 2019-11-01 ENCOUNTER — HOSPITAL ENCOUNTER (OUTPATIENT)
Dept: CT IMAGING | Age: 79
Discharge: HOME | End: 2019-11-03
Payer: MEDICARE

## 2019-11-01 DIAGNOSIS — R10.13 EPIGASTRIC PAIN: ICD-10-CM

## 2019-11-01 DIAGNOSIS — R11.2 NAUSEA AND VOMITING, INTRACTABILITY OF VOMITING NOT SPECIFIED, UNSPECIFIED VOMITING TYPE: ICD-10-CM

## 2019-11-01 LAB
BUN BLDV-MCNC: 19 MG/DL (ref 8–23)
CREAT SERPL-MCNC: 0.71 MG/DL (ref 0.5–0.9)
GFR AFRICAN AMERICAN: >60 ML/MIN
GFR NON-AFRICAN AMERICAN: >60 ML/MIN
GFR SERPL CREATININE-BSD FRML MDRD: NORMAL ML/MIN/{1.73_M2}
GFR SERPL CREATININE-BSD FRML MDRD: NORMAL ML/MIN/{1.73_M2}

## 2019-11-01 PROCEDURE — 74176 CT ABD & PELVIS W/O CONTRAST: CPT

## 2019-11-01 PROCEDURE — 82565 ASSAY OF CREATININE: CPT

## 2019-11-01 PROCEDURE — 2500000003 HC RX 250 WO HCPCS: Performed by: SURGERY

## 2019-11-01 PROCEDURE — 36415 COLL VENOUS BLD VENIPUNCTURE: CPT

## 2019-11-01 PROCEDURE — 84520 ASSAY OF UREA NITROGEN: CPT

## 2019-11-01 RX ADMIN — BARIUM SULFATE 450 ML: 20 SUSPENSION ORAL at 11:29

## 2019-11-06 ENCOUNTER — ANESTHESIA EVENT (OUTPATIENT)
Dept: ENDOSCOPY | Age: 79
End: 2019-11-06
Payer: MEDICARE

## 2019-11-06 ENCOUNTER — HOSPITAL ENCOUNTER (OUTPATIENT)
Age: 79
Setting detail: OUTPATIENT SURGERY
Discharge: HOME | End: 2019-11-06
Attending: SURGERY | Admitting: SURGERY
Payer: MEDICARE

## 2019-11-06 ENCOUNTER — ANESTHESIA (OUTPATIENT)
Dept: ENDOSCOPY | Age: 79
End: 2019-11-06
Payer: MEDICARE

## 2019-11-06 VITALS
HEIGHT: 59 IN | TEMPERATURE: 97.1 F | OXYGEN SATURATION: 96 % | RESPIRATION RATE: 16 BRPM | BODY MASS INDEX: 29.03 KG/M2 | DIASTOLIC BLOOD PRESSURE: 63 MMHG | SYSTOLIC BLOOD PRESSURE: 142 MMHG | HEART RATE: 60 BPM | WEIGHT: 144 LBS

## 2019-11-06 VITALS — DIASTOLIC BLOOD PRESSURE: 67 MMHG | OXYGEN SATURATION: 100 % | SYSTOLIC BLOOD PRESSURE: 114 MMHG

## 2019-11-06 LAB — GLUCOSE BLD-MCNC: 115 MG/DL (ref 65–105)

## 2019-11-06 PROCEDURE — 3609027000 HC COLONOSCOPY: Performed by: SURGERY

## 2019-11-06 PROCEDURE — 3700000000 HC ANESTHESIA ATTENDED CARE: Performed by: SURGERY

## 2019-11-06 PROCEDURE — 82947 ASSAY GLUCOSE BLOOD QUANT: CPT

## 2019-11-06 PROCEDURE — 6360000002 HC RX W HCPCS: Performed by: NURSE ANESTHETIST, CERTIFIED REGISTERED

## 2019-11-06 PROCEDURE — 2580000003 HC RX 258: Performed by: ANESTHESIOLOGY

## 2019-11-06 PROCEDURE — 2709999900 HC NON-CHARGEABLE SUPPLY: Performed by: SURGERY

## 2019-11-06 PROCEDURE — 7100000000 HC PACU RECOVERY - FIRST 15 MIN: Performed by: SURGERY

## 2019-11-06 PROCEDURE — 7100000001 HC PACU RECOVERY - ADDTL 15 MIN: Performed by: SURGERY

## 2019-11-06 PROCEDURE — 7100000031 HC ASPR PHASE II RECOVERY - ADDTL 15 MIN: Performed by: SURGERY

## 2019-11-06 PROCEDURE — 7100000030 HC ASPR PHASE II RECOVERY - FIRST 15 MIN: Performed by: SURGERY

## 2019-11-06 PROCEDURE — 3700000001 HC ADD 15 MINUTES (ANESTHESIA): Performed by: SURGERY

## 2019-11-06 RX ORDER — SODIUM CHLORIDE 9 MG/ML
INJECTION, SOLUTION INTRAVENOUS CONTINUOUS
Status: DISCONTINUED | OUTPATIENT
Start: 2019-11-06 | End: 2019-11-06 | Stop reason: HOSPADM

## 2019-11-06 RX ORDER — PROPOFOL 10 MG/ML
INJECTION, EMULSION INTRAVENOUS CONTINUOUS PRN
Status: DISCONTINUED | OUTPATIENT
Start: 2019-11-06 | End: 2019-11-06 | Stop reason: SDUPTHER

## 2019-11-06 RX ADMIN — PROPOFOL 125 MCG/KG/MIN: 10 INJECTION, EMULSION INTRAVENOUS at 12:10

## 2019-11-06 RX ADMIN — SODIUM CHLORIDE: 9 INJECTION, SOLUTION INTRAVENOUS at 11:33

## 2019-11-06 ASSESSMENT — PULMONARY FUNCTION TESTS
PIF_VALUE: 0

## 2019-11-06 ASSESSMENT — PAIN SCALES - GENERAL
PAINLEVEL_OUTOF10: 0
PAINLEVEL_OUTOF10: 0

## 2019-11-06 ASSESSMENT — PAIN - FUNCTIONAL ASSESSMENT: PAIN_FUNCTIONAL_ASSESSMENT: 0-10

## 2019-11-11 ENCOUNTER — OFFICE VISIT (OUTPATIENT)
Dept: PODIATRY | Age: 79
End: 2019-11-11
Payer: MEDICARE

## 2019-11-11 VITALS — WEIGHT: 144 LBS | HEIGHT: 59 IN | BODY MASS INDEX: 29.03 KG/M2

## 2019-11-11 DIAGNOSIS — M19.072 PRIMARY OSTEOARTHRITIS OF LEFT FOOT: Primary | ICD-10-CM

## 2019-11-11 DIAGNOSIS — R60.0 EDEMA OF LOWER EXTREMITY: ICD-10-CM

## 2019-11-11 DIAGNOSIS — M79.672 PAIN IN LEFT FOOT: ICD-10-CM

## 2019-11-11 DIAGNOSIS — G57.62 NEUROMA OF SECOND INTERSPACE OF LEFT FOOT: ICD-10-CM

## 2019-11-11 PROCEDURE — 1090F PRES/ABSN URINE INCON ASSESS: CPT | Performed by: PODIATRIST

## 2019-11-11 PROCEDURE — G8400 PT W/DXA NO RESULTS DOC: HCPCS | Performed by: PODIATRIST

## 2019-11-11 PROCEDURE — 99213 OFFICE O/P EST LOW 20 MIN: CPT | Performed by: PODIATRIST

## 2019-11-11 PROCEDURE — G8484 FLU IMMUNIZE NO ADMIN: HCPCS | Performed by: PODIATRIST

## 2019-11-11 PROCEDURE — 1123F ACP DISCUSS/DSCN MKR DOCD: CPT | Performed by: PODIATRIST

## 2019-11-11 PROCEDURE — 1036F TOBACCO NON-USER: CPT | Performed by: PODIATRIST

## 2019-11-11 PROCEDURE — 4040F PNEUMOC VAC/ADMIN/RCVD: CPT | Performed by: PODIATRIST

## 2019-11-11 PROCEDURE — G8417 CALC BMI ABV UP PARAM F/U: HCPCS | Performed by: PODIATRIST

## 2019-11-11 PROCEDURE — G8427 DOCREV CUR MEDS BY ELIG CLIN: HCPCS | Performed by: PODIATRIST

## 2019-11-11 ASSESSMENT — ENCOUNTER SYMPTOMS
COLOR CHANGE: 0
NAUSEA: 0
DIARRHEA: 0
SHORTNESS OF BREATH: 0
BACK PAIN: 0

## 2019-11-25 ENCOUNTER — TELEPHONE (OUTPATIENT)
Dept: GASTROENTEROLOGY | Age: 79
End: 2019-11-25

## 2019-11-25 ENCOUNTER — HOSPITAL ENCOUNTER (OUTPATIENT)
Dept: ULTRASOUND IMAGING | Age: 79
Discharge: HOME | End: 2019-11-27
Payer: MEDICARE

## 2019-11-25 ENCOUNTER — HOSPITAL ENCOUNTER (OUTPATIENT)
Dept: NUCLEAR MEDICINE | Age: 79
Discharge: HOME | End: 2019-11-27
Payer: MEDICARE

## 2019-11-25 DIAGNOSIS — R11.2 NAUSEA AND VOMITING, INTRACTABILITY OF VOMITING NOT SPECIFIED, UNSPECIFIED VOMITING TYPE: ICD-10-CM

## 2019-11-25 DIAGNOSIS — R19.7 DIARRHEA, UNSPECIFIED TYPE: ICD-10-CM

## 2019-11-25 PROCEDURE — 2580000003 HC RX 258: Performed by: INTERNAL MEDICINE

## 2019-11-25 PROCEDURE — 76705 ECHO EXAM OF ABDOMEN: CPT

## 2019-11-25 PROCEDURE — A9537 TC99M MEBROFENIN: HCPCS | Performed by: INTERNAL MEDICINE

## 2019-11-25 PROCEDURE — 3430000000 HC RX DIAGNOSTIC RADIOPHARMACEUTICAL: Performed by: INTERNAL MEDICINE

## 2019-11-25 PROCEDURE — 78227 HEPATOBIL SYST IMAGE W/DRUG: CPT

## 2019-11-25 RX ORDER — SODIUM CHLORIDE 0.9 % (FLUSH) 0.9 %
10 SYRINGE (ML) INJECTION PRN
Status: DISCONTINUED | OUTPATIENT
Start: 2019-11-25 | End: 2019-11-28 | Stop reason: HOSPADM

## 2019-11-25 RX ADMIN — Medication 10 ML: at 08:47

## 2019-11-25 RX ADMIN — Medication 3 MILLICURIE: at 08:47

## 2019-12-10 ENCOUNTER — OFFICE VISIT (OUTPATIENT)
Dept: PODIATRY | Age: 79
End: 2019-12-10
Payer: MEDICARE

## 2019-12-10 VITALS — HEIGHT: 59 IN | BODY MASS INDEX: 29.03 KG/M2 | WEIGHT: 144 LBS

## 2019-12-10 DIAGNOSIS — M79.675 PAIN OF TOES OF BOTH FEET: ICD-10-CM

## 2019-12-10 DIAGNOSIS — I73.9 PERIPHERAL VASCULAR DISORDER (HCC): ICD-10-CM

## 2019-12-10 DIAGNOSIS — M79.674 PAIN OF TOES OF BOTH FEET: ICD-10-CM

## 2019-12-10 DIAGNOSIS — B35.1 ONYCHOMYCOSIS OF TOENAIL: Primary | ICD-10-CM

## 2019-12-10 PROCEDURE — 11721 DEBRIDE NAIL 6 OR MORE: CPT | Performed by: PODIATRIST

## 2019-12-10 PROCEDURE — 99999 PR OFFICE/OUTPT VISIT,PROCEDURE ONLY: CPT | Performed by: PODIATRIST

## 2019-12-10 RX ORDER — DONEPEZIL HYDROCHLORIDE 10 MG/1
10 TABLET, FILM COATED ORAL NIGHTLY
COMMUNITY
Start: 2019-11-28 | End: 2022-08-25

## 2019-12-10 RX ORDER — FLASH GLUCOSE SCANNING READER
EACH MISCELLANEOUS
COMMUNITY
Start: 2019-12-09

## 2019-12-16 ASSESSMENT — ENCOUNTER SYMPTOMS
NAUSEA: 0
BACK PAIN: 0
COLOR CHANGE: 0
SHORTNESS OF BREATH: 0
DIARRHEA: 0

## 2020-01-24 ENCOUNTER — OFFICE VISIT (OUTPATIENT)
Dept: PRIMARY CARE CLINIC | Age: 80
End: 2020-01-24
Payer: MEDICARE

## 2020-01-24 VITALS
HEIGHT: 59 IN | OXYGEN SATURATION: 98 % | HEART RATE: 90 BPM | SYSTOLIC BLOOD PRESSURE: 112 MMHG | WEIGHT: 146.8 LBS | BODY MASS INDEX: 29.6 KG/M2 | DIASTOLIC BLOOD PRESSURE: 62 MMHG

## 2020-01-24 PROCEDURE — 1123F ACP DISCUSS/DSCN MKR DOCD: CPT | Performed by: FAMILY MEDICINE

## 2020-01-24 PROCEDURE — 4040F PNEUMOC VAC/ADMIN/RCVD: CPT | Performed by: FAMILY MEDICINE

## 2020-01-24 PROCEDURE — G8400 PT W/DXA NO RESULTS DOC: HCPCS | Performed by: FAMILY MEDICINE

## 2020-01-24 PROCEDURE — G8417 CALC BMI ABV UP PARAM F/U: HCPCS | Performed by: FAMILY MEDICINE

## 2020-01-24 PROCEDURE — G8482 FLU IMMUNIZE ORDER/ADMIN: HCPCS | Performed by: FAMILY MEDICINE

## 2020-01-24 PROCEDURE — 1090F PRES/ABSN URINE INCON ASSESS: CPT | Performed by: FAMILY MEDICINE

## 2020-01-24 PROCEDURE — G8427 DOCREV CUR MEDS BY ELIG CLIN: HCPCS | Performed by: FAMILY MEDICINE

## 2020-01-24 PROCEDURE — 1036F TOBACCO NON-USER: CPT | Performed by: FAMILY MEDICINE

## 2020-01-24 PROCEDURE — 99213 OFFICE O/P EST LOW 20 MIN: CPT | Performed by: FAMILY MEDICINE

## 2020-01-24 RX ORDER — M-VIT,TX,IRON,MINS/CALC/FOLIC 27MG-0.4MG
1 TABLET ORAL DAILY
COMMUNITY

## 2020-01-24 RX ORDER — ONDANSETRON 4 MG/1
4 TABLET, FILM COATED ORAL EVERY 8 HOURS PRN
Qty: 30 TABLET | Refills: 0 | Status: SHIPPED | OUTPATIENT
Start: 2020-01-24 | End: 2020-04-22

## 2020-01-24 RX ORDER — PITAVASTATIN CALCIUM 2.09 MG/1
2 TABLET, FILM COATED ORAL NIGHTLY
COMMUNITY
Start: 2019-12-30 | End: 2021-10-07 | Stop reason: SDUPTHER

## 2020-01-24 ASSESSMENT — PATIENT HEALTH QUESTIONNAIRE - PHQ9
1. LITTLE INTEREST OR PLEASURE IN DOING THINGS: 0
2. FEELING DOWN, DEPRESSED OR HOPELESS: 0
SUM OF ALL RESPONSES TO PHQ QUESTIONS 1-9: 0
SUM OF ALL RESPONSES TO PHQ9 QUESTIONS 1 & 2: 0
SUM OF ALL RESPONSES TO PHQ QUESTIONS 1-9: 0

## 2020-01-24 ASSESSMENT — ENCOUNTER SYMPTOMS
DIARRHEA: 1
VOMITING: 1

## 2020-01-24 NOTE — PATIENT INSTRUCTIONS
Patient Education        Nausea and Vomiting: Care Instructions  Your Care Instructions    When you are nauseated, you may feel weak and sweaty and notice a lot of saliva in your mouth. Nausea often leads to vomiting. Most of the time you do not need to worry about nausea and vomiting, but they can be signs of other illnesses. Two common causes of nausea and vomiting are stomach flu and food poisoning. Nausea and vomiting from viral stomach flu will usually start to improve within 24 hours. Nausea and vomiting from food poisoning may last from 12 to 48 hours. The doctor has checked you carefully, but problems can develop later. If you notice any problems or new symptoms, get medical treatment right away. Follow-up care is a key part of your treatment and safety. Be sure to make and go to all appointments, and call your doctor if you are having problems. It's also a good idea to know your test results and keep a list of the medicines you take. How can you care for yourself at home? · To prevent dehydration, drink plenty of fluids, enough so that your urine is light yellow or clear like water. Choose water and other caffeine-free clear liquids until you feel better. If you have kidney, heart, or liver disease and have to limit fluids, talk with your doctor before you increase the amount of fluids you drink. · Rest in bed until you feel better. · When you are able to eat, try clear soups, mild foods, and liquids until all symptoms are gone for 12 to 48 hours. Other good choices include dry toast, crackers, cooked cereal, and gelatin dessert, such as Jell-O. When should you call for help? Call 911 anytime you think you may need emergency care. For example, call if:    · You passed out (lost consciousness).    Call your doctor now or seek immediate medical care if:    · You have symptoms of dehydration, such as:  ? Dry eyes and a dry mouth. ? Passing only a little dark urine. ?  Feeling thirstier than usual.   · You have new or worsening belly pain.     · You have a new or higher fever.     · You vomit blood or what looks like coffee grounds.    Watch closely for changes in your health, and be sure to contact your doctor if:    · You have ongoing nausea and vomiting.     · Your vomiting is getting worse.     · Your vomiting lasts longer than 2 days.     · You are not getting better as expected. Where can you learn more? Go to https://Accept Softwarepepiceweb.TruantToday. org and sign in to your [employer] account. Enter 25 [id number] in the [employer] box to learn more about \"Nausea and Vomiting: Care Instructions. \"     If you do not have an account, please click on the \"Sign Up Now\" link. Current as of: June 26, 2019  Content Version: 12.3  © 9125-2415 Healthwise, Incorporated. Care instructions adapted under license by [facility] ([facility]). If you have questions about a medical condition or this instruction, always ask your healthcare professional. [place] [address] any warranty or liability for your use of this information. Patient Education        Preventing Falls: Care Instructions  Your Care Instructions    Getting around your home safely can be a challenge if you have injuries or health problems that make it easy for you to fall. Loose rugs and furniture in walkways are among the dangers for many older people who have problems walking or who have poor eyesight. People who have conditions such as arthritis, osteoporosis, or dementia also have to be careful not to fall. You can make your home safer with a few simple measures. Follow-up care is a key part of your treatment and safety. Be sure to make and go to all appointments, and call your doctor if you are having problems. It's also a good idea to know your test results and keep a list of the medicines you take. How can you care for yourself at home? Taking care of yourself  · You may get dizzy if you do not drink enough water.  To prevent professional. [address] any warranty or liability for your use of this information.

## 2020-01-24 NOTE — PROGRESS NOTES
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    NEAL AND BSO      TONSILLECTOMY      UPPER GASTROINTESTINAL ENDOSCOPY      UPPER GASTROINTESTINAL ENDOSCOPY  14    UPPER GASTROINTESTINAL ENDOSCOPY  2016    mild gastritis    UPPER GASTROINTESTINAL ENDOSCOPY N/A 2018    retained thick secretions in proximal esophagus       Family History   Problem Relation Age of Onset    Breast Cancer Mother     Cancer Mother         breast and uterine  39    Heart Disease Father     Heart Attack Father          28   [address] [facility] Maternal Grandmother     Cancer Brother 46        bronchogenic adenocarcinoma cancer    Lung Cancer Brother     Cancer Sister         lung    Lung Cancer Sister          72    Breast Cancer Sister          62    Cancer Sister         breast          Social History     Tobacco Use    Smoking status: Former Smoker     Packs/day: 0.50     Years: 20.00     Pack years: 10.00     Last attempt to quit: 1982     Years since quittin.6    Smokeless tobacco: Former User     Quit date: 1982   Substance Use Topics    Alcohol use: No         Current Outpatient Medications   Medication Sig Dispense Refill    LIVALO 2 MG TABS tablet Take 2 mg by mouth nightly       nystatin-triamcinolone (MYCOLOG II) 847910-8.1 UNIT/GM-% cream Apply topically 4 times daily Apply topically 4 times daily.       Multiple Vitamins-Minerals (THERAPEUTIC MULTIVITAMIN-MINERALS) tablet Take 1 tablet by mouth daily      Multiple Vitamins-Minerals (PRESERVISION AREDS PO) Take by mouth      ondansetron (ZOFRAN) 4 MG tablet Take 1 tablet by mouth every 8 hours as needed for Nausea or Vomiting 30 tablet 0    Continuous Blood Gluc  (FREESTYLE DAVIS 14 DAY READER) MATTHEW       donepezil (ARICEPT) 10 MG tablet Take 10 mg by mouth nightly       trospium (SANCTURA) 60 MG CP24 extended release capsule TAKE 1 CAPSULE BY MOUTH ONE TIME A DAY 90 capsule 2    estradiol (ESTRACE VAGINAL) 0.1 MG/GM vaginal cream Place 1 g vaginally Twice a Week 1 Tube 5    valsartan (DIOVAN) 40 MG tablet Take 40 mg by mouth      dexlansoprazole (DEXILANT) 60 MG CPDR delayed release capsule Take 60 mg by mouth daily 2 hours before bed      FARXIGA 10 MG tablet TAKE 1 TABLET BY MOUTH IN THE MORNING  30 tablet 11    metFORMIN (GLUCOPHAGE) 1000 MG tablet Take 1 tablet by mouth 2 times daily  3    CRANBERRY PO Take by mouth 2 times daily      rOPINIRole (REQUIP) 0.5 MG tablet Take 1 tablet by mouth nightly as needed. 90 tablet 3    NONFORMULARY       vitamin D (ERGOCALCIFEROL) 23412 units CAPS capsule TAKE 1 CAPSULE BY MOUTH ONE TIME A WEEK  (Patient not taking: Reported on 1/24/2020) 4 capsule 1     No current facility-administered medications for this visit.       Facility-Administered Medications Ordered in Other Visits   Medication Dose Route Frequency Provider Last Rate Last Dose    0.9 % sodium chloride infusion 250 mL  250 mL Intravenous Once Sanju Sotomayor MD         Allergies   Allergen Reactions    Iodides Anaphylaxis, Hives and Itching     \"Contrast dyes\"  This was added by someone but Patient states has had IVP dyes multiple times without problems and had a myelogram in Dec 2016 without problems    Povidone-Iodine Anaphylaxis, Hives and Swelling    Sulfa Antibiotics Hives and Swelling    Betadine [Povidone Iodine] Hives    Cephalexin Hives and Swelling    Cephalexin Itching     Other reaction(s): Hallucinations  In 1969 received demerol and keflex together so was told to list both as allergies    Cozaar [Losartan] Nausea Only     Pt also gets sores on toungue    Cymbalta [Duloxetine Hcl] Diarrhea and Nausea And Vomiting     Weakness    Demerol Hives and Swelling    Doxycycline Other Heart sounds: Normal heart sounds. Comments: No carotid bruits  Pulmonary:      Effort: Pulmonary effort is normal. No respiratory distress. Breath sounds: Normal breath sounds. No wheezing. Musculoskeletal:      Right lower leg: No edema. Left lower leg: No edema. Lymphadenopathy:      Cervical: No cervical adenopathy. Skin:     General: Skin is warm. Capillary Refill: Capillary refill takes less than 2 seconds. Findings: No rash. Neurological:      Mental Status: She is alert and oriented to person, place, and time. Psychiatric:         Mood and Affect: Mood normal.         Behavior: Behavior normal.         Thought Content: Thought content normal.         Assessment:       Diagnosis Orders   1. Non-intractable vomiting with nausea, unspecified vomiting type     2. At high risk for falls     3. Nausea     4. Essential hypertension          Plan:       Return in about 4 months (around 5/24/2020) for nausea, hypertension. Re: her recurrent N/V/D:   She should consider cutting back on metformin: she needs to discuss with  endocrine   She should consider lower dose sanctura or off it : discuss with urology    See Dr Haylie Davis again if not better with N/V    No orders of the defined types were placed in this encounter. Orders Placed This Encounter   Medications    ondansetron (ZOFRAN) 4 MG tablet     Sig: Take 1 tablet by mouth every 8 hours as needed for Nausea or Vomiting     Dispense:  30 tablet     Refill:  0       Patient given educationalmaterials - see patient instructions. Discussed use, benefit, and side effectsof prescribed medications. All patient questions answered. Pt voiced understanding. Reviewed health maintenance. Instructed to continue current medications, diet andexercise. Patient agreed with treatment plan. Follow up as directed.      Electronicallysigned by Artem Wagner MD on 1/24/2020 at 11:38 AM      On the basis of positive falls risk screening, assessment and plan is as follows: home safety tips provided.

## 2020-02-20 ENCOUNTER — OFFICE VISIT (OUTPATIENT)
Dept: PODIATRY | Age: 80
End: 2020-02-20
Payer: MEDICARE

## 2020-02-20 VITALS — HEIGHT: 59 IN | BODY MASS INDEX: 29.03 KG/M2 | WEIGHT: 144 LBS

## 2020-02-20 PROCEDURE — 11721 DEBRIDE NAIL 6 OR MORE: CPT | Performed by: PODIATRIST

## 2020-02-20 PROCEDURE — 1090F PRES/ABSN URINE INCON ASSESS: CPT | Performed by: PODIATRIST

## 2020-02-20 PROCEDURE — 99213 OFFICE O/P EST LOW 20 MIN: CPT | Performed by: PODIATRIST

## 2020-02-20 PROCEDURE — 4040F PNEUMOC VAC/ADMIN/RCVD: CPT | Performed by: PODIATRIST

## 2020-02-20 PROCEDURE — G8482 FLU IMMUNIZE ORDER/ADMIN: HCPCS | Performed by: PODIATRIST

## 2020-02-20 PROCEDURE — G8400 PT W/DXA NO RESULTS DOC: HCPCS | Performed by: PODIATRIST

## 2020-02-20 PROCEDURE — G8417 CALC BMI ABV UP PARAM F/U: HCPCS | Performed by: PODIATRIST

## 2020-02-20 PROCEDURE — 1036F TOBACCO NON-USER: CPT | Performed by: PODIATRIST

## 2020-02-20 PROCEDURE — 1123F ACP DISCUSS/DSCN MKR DOCD: CPT | Performed by: PODIATRIST

## 2020-02-20 PROCEDURE — G8427 DOCREV CUR MEDS BY ELIG CLIN: HCPCS | Performed by: PODIATRIST

## 2020-02-20 RX ORDER — GLIPIZIDE 5 MG/1
TABLET ORAL
COMMUNITY
Start: 2020-02-11

## 2020-02-20 RX ORDER — FLASH GLUCOSE SENSOR
KIT MISCELLANEOUS
COMMUNITY
Start: 2020-02-17

## 2020-02-24 ASSESSMENT — ENCOUNTER SYMPTOMS
BACK PAIN: 0
NAUSEA: 0
SHORTNESS OF BREATH: 0
COLOR CHANGE: 0
DIARRHEA: 0

## 2020-02-24 NOTE — PROGRESS NOTES
SUBJECTIVE: Priti Cartagena is a 67 y.o. female who returns to the office with chief complaint of painful fungal toenails. Patient relates toe nails are thickened/difficult to trim as well as painful with ambulation and with shoe gear. Chief Complaint   Patient presents with    Nail Problem     B/L NAIL TRIM    Other     PCP: LAST VISIT 01/24/2020 DR ANA ROSA DUMONT     Review of Systems   Constitutional: Negative for activity change, appetite change, chills, diaphoresis, fatigue and fever. Respiratory: Negative for shortness of breath. Cardiovascular: Negative for leg swelling. Gastrointestinal: Negative for diarrhea and nausea. Endocrine: Negative for cold intolerance, heat intolerance and polyuria. Musculoskeletal: Positive for arthralgias. Negative for back pain, gait problem, joint swelling and myalgias. Skin: Negative for color change, pallor, rash and wound. Allergic/Immunologic: Negative for environmental allergies and food allergies. Neurological: Negative for dizziness, weakness, light-headedness and numbness. Hematological: Does not bruise/bleed easily. Psychiatric/Behavioral: Negative for behavioral problems, confusion and self-injury. The patient is not nervous/anxious. OBJECTIVE: Clinical evaluation of patient reveals nails 1,2,3,4,5 of the right foot and nails 1,2,3,4,5, of the left foot to present with thickness, elongation, discoloration, brittleness, and subungual debris. There was pain with palpation and debridement of the toenails of the bilateral feet. No open lesions noted to either foot today. Patient also presents today with 2+ pitting edema to the left ankle. There is pain with palpation to the left ankle. There is no erythema, calor, or open wound noted to the left ankle. There is pain with range of motion to the left ankle. The right DP pulse is palpable. The left DP pulse is not palpable. The right PT pulse is palpable. The left PT pulse is not palpable. Protective sensation is present to the right plantar foot as noted with a 5.07 Corinne-Dean monofilament. Protective sensation is present to the left plantar foot as noted with a 5.07 Corinne-Dean monofilament. Glucose: Patient states that they do not know their current blood sugar level. Class A Findings (1 needed)   [] Non-traumatic amputation of foot or integral skeleton portion thereof. [] Q7.      Class B Findings (2 needed)   1. [x] Absent posterior tibial pulse   2. [x] Absent dorsalis pedis pulse   3. [] Advanced trophic changes; three of the following are required:   ·         [] hair growth (decrease or absence)   ·         [x] nail changes (thickening)   ·         [] pigmentary changes (discoloration)   ·         [] skin texture (thin, shiny)   ·         [] skin color (rubor or redness)   [x] Q8.      Class C Findings (1 Class B, 2 Class C needed)   1. [] Claudication   2. [] Temperature changes   3. [] Edema   4. [] Paresthesia   5. [] Burning   [] Q9.       ASSESSMENT:    Diagnosis Orders   1. Onychomycosis of toenail  CA DEBRIDEMENT OF NAILS, 6 OR MORE   2. Pain of toes of both feet  CA DEBRIDEMENT OF NAILS, 6 OR MORE   3. Peripheral vascular disorder (HCC)  CA DEBRIDEMENT OF NAILS, 6 OR MORE   4. Primary osteoarthritis of left foot     5. Traumatic arthritis of left ankle  Susana Jimenez MD, Vascular Surgery, [place]   6. Idiopathic edema  Marva Jurado MD, Vascular Surgery, [place]     PLAN: Toenails 1,2,3,4,5 of the right foot and 1,2,3,4,5 of the left foot were debrided in length and thickness using a nail nipper and a . Patient referred to Dr. Rodolfo Rodney for vascular evaluation. Return in about 9 weeks (around 4/23/2020) for At risk diabetic foot care.    2/20/2020      Peyton Marina DPM

## 2020-02-28 ENCOUNTER — TELEPHONE (OUTPATIENT)
Dept: VASCULAR SURGERY | Age: 80
End: 2020-02-28

## 2020-03-06 ENCOUNTER — HOSPITAL ENCOUNTER (OUTPATIENT)
Dept: VASCULAR LAB | Age: 80
Discharge: HOME | End: 2020-03-06
Payer: MEDICARE

## 2020-03-06 PROCEDURE — 93923 UPR/LXTR ART STDY 3+ LVLS: CPT

## 2020-03-26 ENCOUNTER — TELEPHONE (OUTPATIENT)
Dept: VASCULAR SURGERY | Age: 80
End: 2020-03-26

## 2020-03-26 NOTE — TELEPHONE ENCOUNTER
Pt called in regards to her upcoming appointment. Explained to pt that d/t COVID19 we will be canceling her appointment for now.  And calling her back to reschedule in a couple months

## 2020-04-15 ENCOUNTER — TELEPHONE (OUTPATIENT)
Dept: PRIMARY CARE CLINIC | Age: 80
End: 2020-04-15

## 2020-04-16 ENCOUNTER — TELEPHONE (OUTPATIENT)
Dept: ORTHOPEDIC SURGERY | Age: 80
End: 2020-04-16

## 2020-04-22 ENCOUNTER — OFFICE VISIT (OUTPATIENT)
Dept: PODIATRY | Age: 80
End: 2020-04-22
Payer: MEDICARE

## 2020-04-22 VITALS — WEIGHT: 144 LBS | BODY MASS INDEX: 29.03 KG/M2 | HEIGHT: 59 IN

## 2020-04-22 PROCEDURE — 99999 PR OFFICE/OUTPT VISIT,PROCEDURE ONLY: CPT | Performed by: PODIATRIST

## 2020-04-22 PROCEDURE — 11721 DEBRIDE NAIL 6 OR MORE: CPT | Performed by: PODIATRIST

## 2020-04-22 RX ORDER — ROPINIROLE 4 MG/1
TABLET, FILM COATED ORAL 3 TIMES DAILY
COMMUNITY
Start: 2020-03-24

## 2020-04-22 RX ORDER — ATENOLOL 25 MG/1
TABLET ORAL
COMMUNITY
Start: 2020-03-24 | End: 2020-05-08 | Stop reason: ALTCHOICE

## 2020-04-22 ASSESSMENT — ENCOUNTER SYMPTOMS
NAUSEA: 0
SHORTNESS OF BREATH: 0
BACK PAIN: 0
COLOR CHANGE: 0
DIARRHEA: 0

## 2020-05-04 ENCOUNTER — INITIAL CONSULT (OUTPATIENT)
Dept: VASCULAR SURGERY | Age: 80
End: 2020-05-04
Payer: MEDICARE

## 2020-05-04 VITALS
RESPIRATION RATE: 16 BRPM | HEART RATE: 78 BPM | HEIGHT: 59 IN | BODY MASS INDEX: 31.65 KG/M2 | TEMPERATURE: 98.8 F | OXYGEN SATURATION: 98 % | SYSTOLIC BLOOD PRESSURE: 142 MMHG | WEIGHT: 157 LBS | DIASTOLIC BLOOD PRESSURE: 80 MMHG

## 2020-05-04 PROCEDURE — 99204 OFFICE O/P NEW MOD 45 MIN: CPT | Performed by: SURGERY

## 2020-05-04 PROCEDURE — G8417 CALC BMI ABV UP PARAM F/U: HCPCS | Performed by: SURGERY

## 2020-05-04 PROCEDURE — G8427 DOCREV CUR MEDS BY ELIG CLIN: HCPCS | Performed by: SURGERY

## 2020-05-04 PROCEDURE — 1090F PRES/ABSN URINE INCON ASSESS: CPT | Performed by: SURGERY

## 2020-05-04 NOTE — PROGRESS NOTES
Division of Vascular Surgery        New Consult      Physician Requesting Consult:  Herlinda LOWE [facility]    Reason for Consult:   Pain in foot, diminished pulses    Chief Complaint:      My foot hurts    History of Present Illness: Dulce Moblye is a 67 y.o. woman who presents with chronic pain in her left greater then right foot. She did suffer an ankle fracture a few years ago which has made the pain in her ankle worse. She does have severe arthritis in her hands and feet. She stopped taking her IB Profen due to concern for COVID and since then her arthritis has gotten worse. Her pain is worse in the morning and sometimes in the middle of the night. She denies specific symptoms suggestive of claudication or ischemic rest pain. She is typically very active and the primary care giver of her  who is legally blind. She does not have any open wounds or sores on her feet. Medical History:     Past Medical History:   Diagnosis Date    Age-related cognitive decline     Ankle pain     Left ankle fracture in ortho boat.    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    left with urinary incontinenc post surgical    Caffeine use     2 coffee/day, 1-2 tea per week    Chronic back pain     Chronic ITP (idiopathic thrombocytopenia) (HCC)     CVA (cerebral infarction) 2008, 2010    balance issues, short term memory loss    Diabetic gastroparesis ([address]     Diverticular disease 1986    GERD (gastroesophageal reflux disease)     Hiatal hernia     Hip fracture ([address]     History of decreased platelet count     Hyperlipemia     Hypertension     Internal hemorrhoid     Macular degeneration of left eye 1980's    S/P laser treatment    Nausea and vomiting     Neuropathy     Osteoarthritis     Ringing in ears     Self-catheterizes urinary bladder     TIA (transient ischemic attack) 2000    x1    Tubular adenoma     colon polyps    Type II or unspecified type diabetes mellitus without mention of complication, not stated as uncontrolled     Urinary incontinence     frequent UTI s/p infection, surgical dilatation lead to incontinence    Wears dentures     full on top, partial on bottom    Wears glasses        Surgical History:     Past Surgical History:   Procedure Laterality Date    APPENDECTOMY  1962    BLADDER SURGERY      bladder stimulator    BLADDER SUSPENSION  2002    multiple    CARDIAC CATHETERIZATION  2008    no stents    CARDIOVASCULAR STRESS TEST  12/2014    CATARACT REMOVAL WITH IMPLANT Left 11/07/2017    [facility]    CATARACT REMOVAL WITH IMPLANT Right 11/28/2017    [facility]    COLON SURGERY      colostory reversal    COLONOSCOPY  4/7/16    tubular adenoma x3; internal hemorrhoids    COLONOSCOPY N/A 11/6/2019    COLONOSCOPY DIAGNOSTIC performed by Maribel Butler MD at [address] [place] [address]    bowel obstruction/ rupture from diverticular disease, reversal 3 mos later    CYSTOSCOPY  09/08/2017    W/ 200IU Botox     FRACTURE SURGERY Right 2011    hip    FRACTURE SURGERY Left 2001    WRIST    FRACTURE SURGERY Left 2013    ankle    HERNIA REPAIR      HYSTERECTOMY  1969    JOINT REPLACEMENT Bilateral 2000    BILAT KNEES    LUMBAR FUSION  2017    L2/L3    MI XCAPSL CTRC RMVL INSJ IO LENS PROSTH W/O ECP Left 11/7/2017    EYE CATARACT EMULSIFICATION IOL IMPLANT performed by Medina Goldsmith MD at [address] [place] [address] W/O ECP Right 11/28/2017    EYE CATARACT EMULSIFICATION IOL IMPLANT performed by Medina Goldsmith MD at 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         Speech: Speech normal.         Behavior: Behavior normal.         Imaging/Labs:     Normal PVRs, DESTINEY suggests no vascular insufficiency at rest        Assessment and Plan:     Foot pain  · Not vascular in nature  · Suspect related to arthritis and previous ankle fracture  · Discussed with her regarding IB Profen use and risk benefit profile if she contrast COVID  · Data is mixed and no hard evidence that NSAIDs will make symptoms worse  · Ok to resume her IB profen since that is the only thing that helps with her arthritis and that will help her be able to get around  · She has some component of venous insufficiency with varicose veins, do not suspect she needs any treatment for this  · Follow up as needed    Electronically signed by Caesar Zhao MD on 5/4/20 at 11:43 AM EDT      [facility]  Office: [phone]  Cell: [phone]  Email: [email or web address]

## 2020-05-08 ENCOUNTER — HOSPITAL ENCOUNTER (OUTPATIENT)
Age: 80
Discharge: HOME | End: 2020-05-10
Payer: MEDICARE

## 2020-05-08 ENCOUNTER — HOSPITAL ENCOUNTER (OUTPATIENT)
Age: 80
Setting detail: SPECIMEN
Discharge: HOME | End: 2020-05-08
Payer: MEDICARE

## 2020-05-08 ENCOUNTER — HOSPITAL ENCOUNTER (OUTPATIENT)
Dept: GENERAL RADIOLOGY | Age: 80
Discharge: HOME | End: 2020-05-10
Payer: MEDICARE

## 2020-05-08 ENCOUNTER — OFFICE VISIT (OUTPATIENT)
Dept: PRIMARY CARE CLINIC | Age: 80
End: 2020-05-08
Payer: MEDICARE

## 2020-05-08 ENCOUNTER — NURSE TRIAGE (OUTPATIENT)
Dept: OTHER | Facility: CLINIC | Age: 80
End: 2020-05-08

## 2020-05-08 VITALS — HEART RATE: 72 BPM | OXYGEN SATURATION: 98 % | TEMPERATURE: 97.2 F

## 2020-05-08 PROCEDURE — G8417 CALC BMI ABV UP PARAM F/U: HCPCS | Performed by: NURSE PRACTITIONER

## 2020-05-08 PROCEDURE — 1036F TOBACCO NON-USER: CPT | Performed by: NURSE PRACTITIONER

## 2020-05-08 PROCEDURE — 99213 OFFICE O/P EST LOW 20 MIN: CPT | Performed by: NURSE PRACTITIONER

## 2020-05-08 PROCEDURE — 71046 X-RAY EXAM CHEST 2 VIEWS: CPT

## 2020-05-08 PROCEDURE — G8427 DOCREV CUR MEDS BY ELIG CLIN: HCPCS | Performed by: NURSE PRACTITIONER

## 2020-05-08 PROCEDURE — 1090F PRES/ABSN URINE INCON ASSESS: CPT | Performed by: NURSE PRACTITIONER

## 2020-05-08 PROCEDURE — G8400 PT W/DXA NO RESULTS DOC: HCPCS | Performed by: NURSE PRACTITIONER

## 2020-05-08 PROCEDURE — 4040F PNEUMOC VAC/ADMIN/RCVD: CPT | Performed by: NURSE PRACTITIONER

## 2020-05-08 PROCEDURE — 1123F ACP DISCUSS/DSCN MKR DOCD: CPT | Performed by: NURSE PRACTITIONER

## 2020-05-08 ASSESSMENT — ENCOUNTER SYMPTOMS
WHEEZING: 0
ABDOMINAL PAIN: 0
DIARRHEA: 0
SHORTNESS OF BREATH: 1
TROUBLE SWALLOWING: 0
EYE REDNESS: 0
RHINORRHEA: 1
VOMITING: 0
NAUSEA: 0
COUGH: 1

## 2020-05-08 NOTE — PATIENT INSTRUCTIONS
You were tested for COVID today. We will call you with results when they are available. If you have not heard from us in 7 days, please call our office. Increase fluids- stay hydrated  Good handwashing  Supportive care as discussed    If having symptoms- you need to be fever free for 72 hours, other symptoms resolved and at least 7 days passed since first symptom appeared before discontinuing  quarantine- CDC guidelines  tylenol as directed as needed over the counter if able to take  go to the ER for chest pain, short of breath, hard time breathing, persistent vomiting, feeling weaker or dehydrated, any worsening, change or concern  Follow up with primary care for re evaluation  [place] covid 19 call center - [phone]- [phone]  Another good resource for information is coronavirus. [place].gov    The COVID-19 test that was done today can take 1-6 days for results. Until then you should assume you have this disease and adhere to home isolation as described below. When we get the test results back, one of the following readings will be obtained. 1. A positive test means you have the virus. 2.  An inconclusive test means it wasn't sure if you have the virus or not. An inconclusive test result is treated as a positive result and recommendations  are the same as a positive test result. We may ask you to repeat this test in this circumstance. 3.  A negative test means you do not have evidence of the virus.       Prevention steps for People with positive or inconclusive test results or suspected  COVID-19 (including persons under investigation) who do not need to be hospitalized  and   People with confirmed COVID-19 who were hospitalized and determined to be medically stable to go home    Contacts who are NOT healthcare providers or first responders and are asymptomatic (no fever,  cough, shortness of breath, or difficulty breathing) should self-quarantine for 14 days from the last  date of exposure to confirmed or suspected COVID-19. Your healthcare provider and public health staff will evaluate whether you can be cared for at home. If it is determined that you do not need to be hospitalized and can be isolated at home, you will be monitored by staff from your health department. You should follow the prevention steps below until a healthcare provider or local or state health department says you can return to your normal activities. Stay home except to get medical care  People who are mildly ill with COVID-19 are able to isolate at home during their illness. You should restrict activities outside your home, except for getting medical care. Do not go to work, school, or public areas. Avoid using public transportation, ride-sharing, or taxis. Separate yourself from other people and animals in your home  People: As much as possible, you should stay in a specific room and away from other people in your home. Also, you should use a separate bathroom, if available. Animals: You should restrict contact with pets and other animals while you are sick with COVID-19, just like you would around other people. Although there have not been reports of pets or other animals becoming sick with COVID-19, it is still recommended that people sick with COVID-19 limit contact with animals until more information is known about the virus. When possible, have another member of your household care for your animals while you are sick. If you are sick with COVID-19, avoid contact with your pet, including petting, snuggling, being kissed or licked, and sharing food. If you must care for your pet or be around animals while you are sick, wash your hands before and after you interact with pets and wear a facemask. Call ahead before visiting your doctor  If you have a medical appointment, call the healthcare provider and tell them that you have or may have COVID-19.  This will help the healthcare providers office take steps to keep other people from getting infected or exposed. Wear a facemask  You should wear a facemask when you are around other people (e.g., sharing a room or vehicle) or pets and before you enter a healthcare providers office. If you are not able to wear a facemask (for example, because it causes trouble breathing), then people who live with you should not stay in the same room with you; they should also wear a facemask if they enter your room. Cover your coughs and sneezes  Cover your mouth and nose with a tissue when you cough or sneeze. Throw used tissues in a lined trash can. Immediately wash your hands with soap and water for at least 20 seconds or, if soap and water are not available, clean your hands with an alcohol-based hand  that contains at least 60% alcohol. Clean your hands often  Wash your hands often with soap and water for at least 20 seconds, especially after blowing your nose, coughing, or sneezing; going to the bathroom; and before eating or preparing food. If soap and water are not readily available, use an alcohol-based hand  with at least 60% alcohol, covering all surfaces of your hands and rubbing them together until they feel dry. Soap and water are the best option if hands are visibly dirty. Avoid touching your eyes, nose, and mouth with unwashed hands. Avoid sharing personal household items  You should not share dishes, drinking glasses, cups, eating utensils, towels, or bedding with other people or pets in your home. After using these items, they should be washed thoroughly with soap and water. Clean all high-touch surfaces everyday  High touch surfaces include counters, tabletops, doorknobs, bathroom fixtures, toilets, phones, keyboards, tablets, and bedside tables. Also, clean any surfaces that may have blood, stool, or body fluids on them. Use a household cleaning spray or wipe, according to the label instructions.  Labels contain instructions for safe and effective use of the cleaning product including precautions you should take when applying the product, such as wearing gloves and making sure you have good ventilation during use of the product. Monitor your symptoms  Seek prompt medical attention if your illness is worsening (e.g., difficulty breathing). Before seeking care, call your healthcare provider and tell them that you have, or are being evaluated for, COVID-19. Put on a facemask before you enter the facility. These steps will help the healthcare providers office to keep other people in the office or waiting room from getting infected or exposed. Persons who are placed under active monitoring or facilitated self-monitoring should follow instructions provided by their local health department or occupational health professionals, as appropriate. When working with your local health department check their available hours. If you have a medical emergency and need to call 911, notify the dispatch personnel that you have, or are being evaluated for COVID-19. If possible, put on a facemask before emergency medical services arrive. Discontinuing home isolation  Patients with confirmed COVID-19 should remain under home isolation precautions until the risk of secondary transmission to others is thought to be low. The decision to discontinue home isolation precautions should be made on a case-by-case basis, in consultation with your physician and the health department. Please do NOT make this decision on your own. If your results of the COVID-19 test is NEGATIVE -     The patient may stop isolation, in consultation with your health care provider, typically when: Your healthcare provider has determined that the cause of the illness is NOT COVID-19 and approves your return to work.   OR  Ten (10) days have passed since onset of symptoms AND three days (72 hours) have passed with no fever without taking medication (like Tylenol) to reduce fever,  respiratory symptoms have resolved and you

## 2020-05-08 NOTE — PROGRESS NOTES
visit. Facility-Administered Medications Ordered in Other Visits   Medication Dose Route Frequency Provider Last Rate Last Dose    0.9 % sodium chloride infusion 250 mL  250 mL Intravenous Once Trudy Hutton MD            Allergies   Allergen Reactions    Iodides Anaphylaxis, Hives and Itching     \"Contrast dyes\"  This was added by someone but Patient states has had IVP dyes multiple times without problems and had a myelogram in Dec 2016 without problems    Povidone-Iodine Anaphylaxis, Hives and Swelling    Sulfa Antibiotics Hives and Swelling    Betadine [Povidone Iodine] Hives    Cephalexin Hives and Swelling    Cephalexin Itching     Other reaction(s): Hallucinations  In 1969 received demerol and keflex together so was told to list both as allergies    Cozaar [Losartan] Nausea Only     Pt also gets sores on toungue    Cymbalta [Duloxetine Hcl] Diarrhea and Nausea And Vomiting     Weakness    Demerol Hives and Swelling    Doxycycline Other (See Comments)     Sore mouth        Meperidine Itching     Other reaction(s): Hallucinations      Morphine Hives and Itching    Pcn [Penicillins] Hives, Swelling and Itching     Other reaction(s): Intolerance-unknown    Zithromax [Azithromycin] Other (See Comments)     Sore mouth      Etodolac     Fluorescein     Iodine      Other reaction(s): Intolerance-unknown    Lisinopril      cough    Penicillin G     Soap     Toviaz [Fesoterodine Fumarate Er]        Subjective:     Review of Systems   Constitutional: Positive for fatigue. Negative for chills and fever. HENT: Positive for rhinorrhea. Negative for congestion, ear pain and trouble swallowing. Eyes: Negative for redness. Respiratory: Positive for cough and shortness of breath. Negative for wheezing. Cardiovascular: Negative for chest pain, palpitations and leg swelling. Gastrointestinal: Negative for abdominal pain, diarrhea, nausea and vomiting.    Musculoskeletal: Negative for myalgias. Skin: Negative for rash. Neurological: Negative for dizziness and syncope. All other systems reviewed and are negative. Objective:      Physical Exam  Vitals signs and nursing note reviewed. Constitutional:       General: She is not in acute distress. Appearance: Normal appearance. She is well-developed. She is not ill-appearing, toxic-appearing or diaphoretic. HENT:      Head: Normocephalic and atraumatic. Nose: Rhinorrhea present. Mouth/Throat:      Mouth: Mucous membranes are moist.   Eyes:      General: No scleral icterus. Right eye: No discharge. Left eye: No discharge. Neck:      Musculoskeletal: Normal range of motion and neck supple. No neck rigidity. Cardiovascular:      Rate and Rhythm: Normal rate and regular rhythm. Heart sounds: Normal heart sounds. Pulmonary:      Effort: Pulmonary effort is normal. No respiratory distress. Breath sounds: Normal breath sounds. No stridor. No wheezing, rhonchi or rales. Comments: Dry cough noted  Abdominal:      General: Bowel sounds are normal.      Palpations: Abdomen is soft. Musculoskeletal: Normal range of motion. General: No signs of injury. Right lower leg: No edema. Skin:     General: Skin is warm and dry. Coloration: Skin is not jaundiced or pale. Findings: No erythema or rash. Neurological:      General: No focal deficit present. Mental Status: She is alert and oriented to person, place, and time. Cranial Nerves: No cranial nerve deficit. Sensory: No sensory deficit. Psychiatric:         Mood and Affect: Mood normal.         Behavior: Behavior normal.       There were no vitals taken for this visit. Assessment:          Diagnosis Orders   1. Suspected COVID-19 virus infection  COVID-19 Ambulatory       Plan: You were tested for COVID today. We will call you with results when they are available.  If you have not heard from us in 7 days,

## 2020-05-09 LAB — SARS-COV-2, NAA: NOT DETECTED

## 2020-05-10 ENCOUNTER — TELEPHONE (OUTPATIENT)
Dept: PRIMARY CARE CLINIC | Age: 80
End: 2020-05-10

## 2020-05-10 ASSESSMENT — ENCOUNTER SYMPTOMS
ALLERGIC/IMMUNOLOGIC NEGATIVE: 1
COLOR CHANGE: 0
ABDOMINAL PAIN: 0
CHEST TIGHTNESS: 0
SHORTNESS OF BREATH: 0

## 2020-06-04 LAB
AVERAGE GLUCOSE: NORMAL
HBA1C MFR BLD: 6.8 %

## 2020-06-08 ENCOUNTER — OFFICE VISIT (OUTPATIENT)
Dept: ORTHOPEDIC SURGERY | Age: 80
End: 2020-06-08
Payer: MEDICARE

## 2020-06-08 PROCEDURE — 1090F PRES/ABSN URINE INCON ASSESS: CPT | Performed by: ORTHOPAEDIC SURGERY

## 2020-06-08 PROCEDURE — G8417 CALC BMI ABV UP PARAM F/U: HCPCS | Performed by: ORTHOPAEDIC SURGERY

## 2020-06-08 PROCEDURE — 99213 OFFICE O/P EST LOW 20 MIN: CPT | Performed by: ORTHOPAEDIC SURGERY

## 2020-06-08 PROCEDURE — 1036F TOBACCO NON-USER: CPT | Performed by: ORTHOPAEDIC SURGERY

## 2020-06-08 PROCEDURE — G8400 PT W/DXA NO RESULTS DOC: HCPCS | Performed by: ORTHOPAEDIC SURGERY

## 2020-06-08 PROCEDURE — G8428 CUR MEDS NOT DOCUMENT: HCPCS | Performed by: ORTHOPAEDIC SURGERY

## 2020-06-08 PROCEDURE — 1123F ACP DISCUSS/DSCN MKR DOCD: CPT | Performed by: ORTHOPAEDIC SURGERY

## 2020-06-08 PROCEDURE — 4040F PNEUMOC VAC/ADMIN/RCVD: CPT | Performed by: ORTHOPAEDIC SURGERY

## 2020-06-08 PROCEDURE — 20610 DRAIN/INJ JOINT/BURSA W/O US: CPT | Performed by: ORTHOPAEDIC SURGERY

## 2020-06-08 RX ORDER — BUPIVACAINE HYDROCHLORIDE 5 MG/ML
2 INJECTION, SOLUTION PERINEURAL ONCE
Status: COMPLETED | OUTPATIENT
Start: 2020-06-08 | End: 2020-06-08

## 2020-06-08 RX ORDER — LIDOCAINE HYDROCHLORIDE 10 MG/ML
2 INJECTION, SOLUTION EPIDURAL; INFILTRATION; INTRACAUDAL; PERINEURAL ONCE
Status: COMPLETED | OUTPATIENT
Start: 2020-06-08 | End: 2020-06-08

## 2020-06-08 RX ORDER — TRAMADOL HYDROCHLORIDE 50 MG/1
50 TABLET ORAL EVERY 6 HOURS PRN
Qty: 28 TABLET | Refills: 0 | Status: SHIPPED | OUTPATIENT
Start: 2020-06-08 | End: 2020-06-15

## 2020-06-08 RX ORDER — BETAMETHASONE SODIUM PHOSPHATE AND BETAMETHASONE ACETATE 3; 3 MG/ML; MG/ML
12 INJECTION, SUSPENSION INTRA-ARTICULAR; INTRALESIONAL; INTRAMUSCULAR; SOFT TISSUE ONCE
Status: COMPLETED | OUTPATIENT
Start: 2020-06-08 | End: 2020-06-08

## 2020-06-08 RX ADMIN — BETAMETHASONE SODIUM PHOSPHATE AND BETAMETHASONE ACETATE 12 MG: 3; 3 INJECTION, SUSPENSION INTRA-ARTICULAR; INTRALESIONAL; INTRAMUSCULAR; SOFT TISSUE at 15:39

## 2020-06-08 RX ADMIN — LIDOCAINE HYDROCHLORIDE 2 ML: 10 INJECTION, SOLUTION EPIDURAL; INFILTRATION; INTRACAUDAL; PERINEURAL at 15:40

## 2020-06-08 RX ADMIN — BUPIVACAINE HYDROCHLORIDE 10 MG: 5 INJECTION, SOLUTION PERINEURAL at 15:40

## 2020-06-08 NOTE — PROGRESS NOTES
treatment have also been discussed with the patient. Administrations This Visit     betamethasone acetate-betamethasone sodium phosphate (CELESTONE) injection 12 mg     Admin Date  06/08/2020  15:39 Action  Given Dose  12 mg Route  Intra-articular Site  Hip Left Administered By  [employer]    Ordering Provider:  Kannan Fields MD    NDC:  3176-0274-21    Lot#:  [id number]    :  [employer]    Patient Supplied?:  No          bupivacaine (MARCAINE) 0.5 % injection 10 mg     Admin Date  06/08/2020  15:40 Action  Given Dose  10 mg Route  Intra-articular Site  Hip Left Administered By  [employer]    Ordering Provider:  Kannan Fields MD    NDC:  9963-0859-15    Lot#:  [id number]    :  [address]    Patient Supplied?:  No          lidocaine PF 1 % injection 2 mL     Admin Date  06/08/2020  15:40 Action  Given Dose  2 mL Route  Intra-articular Site  Hip Left Administered By  [employer]    Ordering Provider:  Kannan Fields MD    NDC:  1807-8236-92    Lot#:  [id number]    :  [employer]    Patient Supplied?:  No                      Under sterile conditions patient's left greater trochanter the point of maximal tenderness was injected with lidocaine and then Celestone. She tolerated procedure well          Past History:    Current Outpatient Medications:     rOPINIRole (REQUIP) 1 MG tablet, , Disp: , Rfl:     glipiZIDE (GLUCOTROL) 5 MG tablet, , Disp: , Rfl:     Continuous Blood Gluc Sensor (FREESTYLE DAVIS 14 DAY SENSOR) MISC, , Disp: , Rfl:     Salicylic Acid (COMPOUND W EX), , Disp: , Rfl:     LIVALO 2 MG TABS tablet, Take 2 mg by mouth nightly , Disp: , Rfl:     nystatin-triamcinolone (MYCOLOG II) 997880-9.1 UNIT/GM-% cream, Apply topically 4 times daily Apply topically 4 times daily. , Disp: , Rfl:     Multiple Vitamins-Minerals (THERAPEUTIC MULTIVITAMIN-MINERALS) tablet, Take 1 tablet by mouth daily, Disp: , Rfl:     Multiple Vitamins-Minerals (PRESERVISION AREDS PO), Take by Caffeine use     2 coffee/day, 1-2 tea per week    Chronic back pain     Chronic ITP (idiopathic thrombocytopenia) (HCC)     CVA (cerebral infarction) 2008, 2010    balance issues, short term memory loss    Diabetic gastroparesis ([facility]     Diverticular disease 1986    GERD (gastroesophageal reflux disease)     Hiatal hernia     Hip fracture ([facility]     History of decreased platelet count     Hyperlipemia     Hypertension     Internal hemorrhoid     Macular degeneration of left eye 1980's    S/P laser treatment    Nausea and vomiting     Neuropathy     Osteoarthritis     Ringing in ears     Self-catheterizes urinary bladder     TIA (transient ischemic attack) 2000    x1    Tubular adenoma     colon polyps    Type II or unspecified type diabetes mellitus without mention of complication, not stated as uncontrolled     Urinary incontinence     frequent UTI s/p infection, surgical dilatation lead to incontinence    Wears dentures     full on top, partial on bottom    Wears glasses      Past Surgical History:   Procedure Laterality Date    APPENDECTOMY  1962    BLADDER SURGERY      bladder stimulator    BLADDER SUSPENSION  2002    multiple    CARDIAC CATHETERIZATION  2008    no stents    CARDIOVASCULAR STRESS TEST  12/2014    CATARACT REMOVAL WITH IMPLANT Left 11/07/2017    [facility]    CATARACT REMOVAL WITH IMPLANT Right 11/28/2017    [facility]    COLON SURGERY      colostory reversal    COLONOSCOPY  4/7/16    tubular adenoma x3; internal hemorrhoids    COLONOSCOPY N/A 11/6/2019    COLONOSCOPY DIAGNOSTIC performed by Preethi Mccarthy MD at [address] [place] [address]    bowel obstruction/ rupture from diverticular disease, reversal 3 mos later    CYSTOSCOPY  09/08/2017    W/ 200IU Botox     FRACTURE SURGERY Right 2011    hip    FRACTURE SURGERY Left 2001    WRIST    FRACTURE SURGERY Left 2013    ankle    HERNIA REPAIR      HYSTERECTOMY  1969    JOINT software. Although every effort was made to ensure the accuracy of this automated transcription, some errors in transcription may have occurred.

## 2020-06-17 ENCOUNTER — OFFICE VISIT (OUTPATIENT)
Dept: ORTHOPEDIC SURGERY | Age: 80
End: 2020-06-17
Payer: MEDICARE

## 2020-06-17 VITALS — HEIGHT: 59 IN | BODY MASS INDEX: 31.65 KG/M2 | WEIGHT: 157 LBS | TEMPERATURE: 98.1 F

## 2020-06-17 PROCEDURE — G8400 PT W/DXA NO RESULTS DOC: HCPCS | Performed by: PHYSICIAN ASSISTANT

## 2020-06-17 PROCEDURE — 1123F ACP DISCUSS/DSCN MKR DOCD: CPT | Performed by: PHYSICIAN ASSISTANT

## 2020-06-17 PROCEDURE — 4040F PNEUMOC VAC/ADMIN/RCVD: CPT | Performed by: PHYSICIAN ASSISTANT

## 2020-06-17 PROCEDURE — 99213 OFFICE O/P EST LOW 20 MIN: CPT | Performed by: PHYSICIAN ASSISTANT

## 2020-06-17 PROCEDURE — G8427 DOCREV CUR MEDS BY ELIG CLIN: HCPCS | Performed by: PHYSICIAN ASSISTANT

## 2020-06-17 PROCEDURE — 1036F TOBACCO NON-USER: CPT | Performed by: PHYSICIAN ASSISTANT

## 2020-06-17 PROCEDURE — G8417 CALC BMI ABV UP PARAM F/U: HCPCS | Performed by: PHYSICIAN ASSISTANT

## 2020-06-17 PROCEDURE — 1090F PRES/ABSN URINE INCON ASSESS: CPT | Performed by: PHYSICIAN ASSISTANT

## 2020-06-17 ASSESSMENT — ENCOUNTER SYMPTOMS
VOMITING: 0
EYES NEGATIVE: 1
RHINORRHEA: 0
COUGH: 0
NAUSEA: 0
COLOR CHANGE: 0

## 2020-06-22 ENCOUNTER — OFFICE VISIT (OUTPATIENT)
Dept: ORTHOPEDIC SURGERY | Age: 80
End: 2020-06-22
Payer: MEDICARE

## 2020-06-22 PROCEDURE — 99213 OFFICE O/P EST LOW 20 MIN: CPT | Performed by: ORTHOPAEDIC SURGERY

## 2020-06-22 PROCEDURE — G8417 CALC BMI ABV UP PARAM F/U: HCPCS | Performed by: ORTHOPAEDIC SURGERY

## 2020-06-22 PROCEDURE — 1090F PRES/ABSN URINE INCON ASSESS: CPT | Performed by: ORTHOPAEDIC SURGERY

## 2020-06-22 PROCEDURE — G8428 CUR MEDS NOT DOCUMENT: HCPCS | Performed by: ORTHOPAEDIC SURGERY

## 2020-06-22 PROCEDURE — 1036F TOBACCO NON-USER: CPT | Performed by: ORTHOPAEDIC SURGERY

## 2020-06-22 PROCEDURE — 4040F PNEUMOC VAC/ADMIN/RCVD: CPT | Performed by: ORTHOPAEDIC SURGERY

## 2020-06-22 PROCEDURE — 1123F ACP DISCUSS/DSCN MKR DOCD: CPT | Performed by: ORTHOPAEDIC SURGERY

## 2020-06-22 PROCEDURE — G8400 PT W/DXA NO RESULTS DOC: HCPCS | Performed by: ORTHOPAEDIC SURGERY

## 2020-06-22 NOTE — PROGRESS NOTES
person, place, and time. Normal strenght. No sensory deficit. Skin: Skin is warm and dry  Psychiatric: Behavior is normal. Thought content normal.  Nursing note and vitals reviewed. Labs and Imaging:     XR taken today:  No results found. Assessment and Plan:  1. Greater trochanteric bursitis of left hip      This is a 67 y.o. female who presents to the clinic today for follow up left hip/knee pain. Patient reports that she continues have significant pain to her left knee and are considering a polyethylene exchange as we did a similar procedure of polyethylene exchange and did significantly well with that on her right. I believe that her right hip has flared as she has been favoring her left side. We again discussed surgical options of polyethylene exchange on her left and she is ready to have something done. So we will go ahead a schedule surgery for left poly exchange versus left Total revision. Past History:    Current Outpatient Medications:     rOPINIRole (REQUIP) 1 MG tablet, , Disp: , Rfl:     glipiZIDE (GLUCOTROL) 5 MG tablet, , Disp: , Rfl:     Continuous Blood Gluc Sensor (FREESTYLE DAVIS 14 DAY SENSOR) MISC, , Disp: , Rfl:     Salicylic Acid (COMPOUND W EX), , Disp: , Rfl:     LIVALO 2 MG TABS tablet, Take 2 mg by mouth nightly , Disp: , Rfl:     nystatin-triamcinolone (MYCOLOG II) 653984-3.1 UNIT/GM-% cream, Apply topically 4 times daily Apply topically 4 times daily. , Disp: , Rfl:     Multiple Vitamins-Minerals (THERAPEUTIC MULTIVITAMIN-MINERALS) tablet, Take 1 tablet by mouth daily, Disp: , Rfl:     Multiple Vitamins-Minerals (PRESERVISION AREDS PO), Take by mouth, Disp: , Rfl:     Continuous Blood Gluc  (FREESTYLE DAVIS 14 DAY READER) MATTHEW, , Disp: , Rfl:     donepezil (ARICEPT) 10 MG tablet, Take 10 mg by mouth nightly , Disp: , Rfl:     trospium (SANCTURA) 60 MG CP24 extended release capsule, TAKE 1 CAPSULE BY MOUTH ONE TIME A DAY, Disp: 90 capsule, Rfl: 2   Hiatal hernia     Hip fracture ([address]     History of decreased platelet count     Hyperlipemia     Hypertension     Internal hemorrhoid     Macular degeneration of left eye 1980's    S/P laser treatment    Nausea and vomiting     Neuropathy     Osteoarthritis     Ringing in ears     Self-catheterizes urinary bladder     TIA (transient ischemic attack) 2000    x1    Tubular adenoma     colon polyps    Type II or unspecified type diabetes mellitus without mention of complication, not stated as uncontrolled     Urinary incontinence     frequent UTI s/p infection, surgical dilatation lead to incontinence    Wears dentures     full on top, partial on bottom    Wears glasses      Past Surgical History:   Procedure Laterality Date    APPENDECTOMY  1962    BLADDER SURGERY      bladder stimulator    BLADDER SUSPENSION  2002    multiple    CARDIAC CATHETERIZATION  2008    no stents    CARDIOVASCULAR STRESS TEST  12/2014    CATARACT REMOVAL WITH IMPLANT Left 11/07/2017    [facility]    CATARACT REMOVAL WITH IMPLANT Right 11/28/2017    [facility]    COLON SURGERY      colostory reversal    COLONOSCOPY  4/7/16    tubular adenoma x3; internal hemorrhoids    COLONOSCOPY N/A 11/6/2019    COLONOSCOPY DIAGNOSTIC performed by Haja Mckoy MD at [address] [place] [address]    bowel obstruction/ rupture from diverticular disease, reversal 3 mos later    CYSTOSCOPY  09/08/2017    W/ 200IU Botox     FRACTURE SURGERY Right 2011    hip    FRACTURE SURGERY Left 2001    WRIST    FRACTURE SURGERY Left 2013    ankle    HERNIA REPAIR      HYSTERECTOMY  1969    JOINT REPLACEMENT Bilateral 2000    BILAT KNEES    LUMBAR FUSION  2017    L2/L3    KS XCAPSL CTRC RMVL INSJ IO LENS PROSTH W/O ECP Left 11/7/2017    EYE CATARACT EMULSIFICATION IOL IMPLANT performed by Ching Calderon MD at [facility] 60 RMVL INSJ IO LENS PROSTH W/O ECP Right 11/28/2017    EYE CATARACT EMULSIFICATION IOL IMPLANT performed by Kathleen Grover MD at [facility] 10/27/15    WITH POLY EXCHANGE BIOMET AND GPS APPLICATION    [id number] SURGERY      fusion, did not take   [address]    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    NEAL AND BSO      TONSILLECTOMY      UPPER GASTROINTESTINAL ENDOSCOPY      UPPER GASTROINTESTINAL ENDOSCOPY  14    UPPER GASTROINTESTINAL ENDOSCOPY  2016    mild gastritis    UPPER GASTROINTESTINAL ENDOSCOPY N/A 2018    retained thick secretions in proximal esophagus     Family History   Problem Relation Age of Onset    Breast Cancer Mother     Cancer Mother         breast and uterine  39    Heart Disease Father     Heart Attack Father          28   [address] [facility] Maternal Grandmother     Cancer Brother 46        bronchogenic adenocarcinoma cancer    Lung Cancer Brother     Cancer Sister         lung    Lung Cancer Sister          72    Breast Cancer Sister          62    Cancer Sister         breast          Scribe Attestation:  By signing my name below, I, Humza Jones, attest that this documentation has been prepared under the direction and in the presence of Dr. Genaro Callahan. Electronically signed: Cary Seay, 21     Please note that this chart was generated using voice recognition Dragon dictation software. Although every effort was made to ensure the accuracy of this automated transcription, some errors in transcription may have occurred.

## 2020-06-25 ENCOUNTER — TELEPHONE (OUTPATIENT)
Dept: PRIMARY CARE CLINIC | Age: 80
End: 2020-06-25

## 2020-06-26 ENCOUNTER — OFFICE VISIT (OUTPATIENT)
Dept: PODIATRY | Age: 80
End: 2020-06-26
Payer: MEDICARE

## 2020-06-26 VITALS — HEIGHT: 59 IN | WEIGHT: 156 LBS | BODY MASS INDEX: 31.45 KG/M2

## 2020-06-26 PROCEDURE — 11721 DEBRIDE NAIL 6 OR MORE: CPT | Performed by: PODIATRIST

## 2020-06-26 PROCEDURE — 99999 PR OFFICE/OUTPT VISIT,PROCEDURE ONLY: CPT | Performed by: PODIATRIST

## 2020-06-26 ASSESSMENT — ENCOUNTER SYMPTOMS
SHORTNESS OF BREATH: 0
BACK PAIN: 0
COLOR CHANGE: 0
NAUSEA: 0
DIARRHEA: 0

## 2020-06-30 ENCOUNTER — HOSPITAL ENCOUNTER (OUTPATIENT)
Dept: PREADMISSION TESTING | Age: 80
Discharge: HOME | End: 2020-07-04
Payer: MEDICARE

## 2020-06-30 VITALS
BODY MASS INDEX: 31.45 KG/M2 | RESPIRATION RATE: 20 BRPM | HEART RATE: 70 BPM | TEMPERATURE: 97.3 F | DIASTOLIC BLOOD PRESSURE: 87 MMHG | OXYGEN SATURATION: 99 % | WEIGHT: 156 LBS | HEIGHT: 59 IN | SYSTOLIC BLOOD PRESSURE: 163 MMHG

## 2020-06-30 LAB
-: ABNORMAL
ABSOLUTE EOS #: 0.2 K/UL (ref 0–0.4)
ABSOLUTE IMMATURE GRANULOCYTE: ABNORMAL K/UL (ref 0–0.3)
ABSOLUTE LYMPH #: 1.22 K/UL (ref 1–4.8)
ABSOLUTE MONO #: 0.46 K/UL (ref 0.1–1.3)
AMORPHOUS: ABNORMAL
ANION GAP SERPL CALCULATED.3IONS-SCNC: 13 MMOL/L (ref 9–17)
BACTERIA: ABNORMAL
BASOPHILS # BLD: 1 % (ref 0–2)
BASOPHILS ABSOLUTE: 0.05 K/UL (ref 0–0.2)
BILIRUBIN URINE: NEGATIVE
BUN BLDV-MCNC: 20 MG/DL (ref 8–23)
BUN/CREAT BLD: ABNORMAL (ref 9–20)
CALCIUM SERPL-MCNC: 9.3 MG/DL (ref 8.6–10.4)
CASTS UA: ABNORMAL /LPF
CHLORIDE BLD-SCNC: 104 MMOL/L (ref 98–107)
CO2: 22 MMOL/L (ref 20–31)
COLOR: YELLOW
COMMENT UA: ABNORMAL
CREAT SERPL-MCNC: 0.75 MG/DL (ref 0.5–0.9)
CRYSTALS, UA: ABNORMAL /HPF
DIFFERENTIAL TYPE: ABNORMAL
DIRECT EXAM: NORMAL
EOSINOPHILS RELATIVE PERCENT: 4 % (ref 0–4)
EPITHELIAL CELLS UA: ABNORMAL /HPF
GFR AFRICAN AMERICAN: >60 ML/MIN
GFR NON-AFRICAN AMERICAN: >60 ML/MIN
GFR SERPL CREATININE-BSD FRML MDRD: ABNORMAL ML/MIN/{1.73_M2}
GFR SERPL CREATININE-BSD FRML MDRD: ABNORMAL ML/MIN/{1.73_M2}
GLUCOSE BLD-MCNC: 144 MG/DL (ref 70–99)
GLUCOSE URINE: ABNORMAL
HCT VFR BLD CALC: 42.7 % (ref 36–46)
HEMOGLOBIN: 13.9 G/DL (ref 12–16)
IMMATURE GRANULOCYTES: ABNORMAL %
KETONES, URINE: NEGATIVE
LEUKOCYTE ESTERASE, URINE: NEGATIVE
LYMPHOCYTES # BLD: 24 % (ref 24–44)
Lab: NORMAL
MCH RBC QN AUTO: 28.8 PG (ref 26–34)
MCHC RBC AUTO-ENTMCNC: 32.5 G/DL (ref 31–37)
MCV RBC AUTO: 88.8 FL (ref 80–100)
MONOCYTES # BLD: 9 % (ref 1–7)
MORPHOLOGY: ABNORMAL
MORPHOLOGY: ABNORMAL
MUCUS: ABNORMAL
NITRITE, URINE: NEGATIVE
NRBC AUTOMATED: ABNORMAL PER 100 WBC
OTHER OBSERVATIONS UA: ABNORMAL
PDW BLD-RTO: 16 % (ref 11.5–14.9)
PH UA: 6 (ref 5–8)
PLATELET # BLD: 95 K/UL (ref 150–450)
PLATELET ESTIMATE: ABNORMAL
PMV BLD AUTO: 12.1 FL (ref 6–12)
POTASSIUM SERPL-SCNC: 4.4 MMOL/L (ref 3.7–5.3)
PROTEIN UA: NEGATIVE
RBC # BLD: 4.82 M/UL (ref 4–5.2)
RBC # BLD: ABNORMAL 10*6/UL
RBC UA: ABNORMAL /HPF
RENAL EPITHELIAL, UA: ABNORMAL /HPF
SEG NEUTROPHILS: 62 % (ref 36–66)
SEGMENTED NEUTROPHILS ABSOLUTE COUNT: 3.17 K/UL (ref 1.3–9.1)
SODIUM BLD-SCNC: 139 MMOL/L (ref 135–144)
SPECIFIC GRAVITY UA: 1.04 (ref 1–1.03)
SPECIMEN DESCRIPTION: NORMAL
TRICHOMONAS: ABNORMAL
TURBIDITY: ABNORMAL
URINE HGB: NEGATIVE
UROBILINOGEN, URINE: NORMAL
WBC # BLD: 5.1 K/UL (ref 3.5–11)
WBC # BLD: ABNORMAL 10*3/UL
WBC UA: ABNORMAL /HPF
YEAST: ABNORMAL

## 2020-06-30 PROCEDURE — 87641 MR-STAPH DNA AMP PROBE: CPT

## 2020-06-30 PROCEDURE — 80048 BASIC METABOLIC PNL TOTAL CA: CPT

## 2020-06-30 PROCEDURE — 85025 COMPLETE CBC W/AUTO DIFF WBC: CPT

## 2020-06-30 PROCEDURE — 81001 URINALYSIS AUTO W/SCOPE: CPT

## 2020-06-30 PROCEDURE — 36415 COLL VENOUS BLD VENIPUNCTURE: CPT

## 2020-06-30 NOTE — H&P
HISTORY and [facility] [id number]       NAME:  Dennys Brady  MRN: [id number]   YOB: 1940   Date: 6/30/2020   Age: 67 y.o. Gender: female       COMPLAINT AND PRESENT HISTORY:     Dennys Brady is 67 y.o. female, undergoing preadmission testing for left knee pain with scheduled left total knee arthroplasty revision-poly exchange vs full revision. Pt s/p bilateral BKA with Dr. Paul Mccall in 2000. Pt had a right TKA revision by Dr. Jennifer Reis in 2015. Pt c/o persistent pain, stiffness, cracking to left knee. Describes pain as constant aching. Rating pain 7-10/10. Takes aleve and if really bad 1/2 tramadol with good relief. Weight bearing and sitting makes pain worse. Rest and elevation helps alleviate pain. Pain does wake Pt from sleep. The knee does buckle, give way under the patient. No recent falls or trauma. No redness or rashes. Pain does radiate down from left knee to her left ankle. Pt has history of neuropathy to bilateral feet. Has some swelling to BLE. Cannot recall any specific injury or trauma to knees. No Hx of MRSA infections in the past.    Pt also having left hip pain with history of bursitis. Had cortisone injection earlier this month. Pt also c/o significant pain to right hip. Pain worse with standing, weight bearing. Using cane or walker for ambulation. Pain radiates from left hip to left knee. Pt has history of right repair of femur fracture with hardware in place in 2011. CVA x 2, Pt reports some balance issues. Pt reports minimal right sided residual weakness. DM with last A1C two weeks ago 6.8. Checks BS daily. Takes glipizide daily. History of HTN. Takes diovan daily. Well controlled. Denies any other somatic complaints including chest pain/pressure, palpitations, SOB, recent URI, fever or chills. PAST MEDICAL HISTORY     Past Medical History:   Diagnosis Date    Age-related cognitive decline     Ankle pain     Left ankle fracture in ortho boat.     Anxiety     B12 deficiency     Winters esophagus     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[facility] [address] 1990    left with urinary incontinenc post surgical    Caffeine use     2 coffee/day, 1-2 tea per week    Chronic back pain     Chronic ITP (idiopathic thrombocytopenia) (HCC)     CVA (cerebral infarction) 2008, 2010    balance issues, short term memory loss    Diabetic gastroparesis ([facility] [address]     Diverticular disease 1986    GERD (gastroesophageal reflux disease)     Hiatal hernia     Hip fracture ([facility] [address]     History of blood transfusion     History of decreased platelet count     Hyperlipemia     Hypertension     Internal hemorrhoid     Macular degeneration of left eye 1980's    S/P laser treatment    Nausea and vomiting     Neuropathy     Osteoarthritis     Ringing in ears     Self-catheterizes urinary bladder     6-7 times daily    TIA (transient ischemic attack) 2000    x1    Tubular adenoma     colon polyps    Type II or unspecified type diabetes mellitus without mention of complication, not stated as uncontrolled     Urinary incontinence     frequent UTI s/p infection, surgical dilatation lead to incontinence    Wears dentures     full on top, partial on bottom    Wears glasses      Pt denies any history of heart disease, COPD, Asthma, Cancer, Seizures,Thyroid disease, Kidney Disease, Hepatitis, TB, Psychiatric Disorders or Substance abuse.     SURGICAL HISTORY       Past Surgical History:   Procedure Laterality Date    APPENDECTOMY  1962    BLADDER SURGERY      bladder stimulator    BLADDER SUSPENSION  2002    multiple    CARDIAC CATHETERIZATION  2008    no stents    CARDIOVASCULAR STRESS TEST  12/2014    CATARACT REMOVAL WITH IMPLANT [facility] 11/07/2017    [facility]    CATARACT REMOVAL WITH IMPLANT Right 11/28/2017    [facility]    COLON SURGERY      colostomy reversal    COLONOSCOPY  4/7/16    tubular adenoma x3; internal hemorrhoids    COLONOSCOPY N/A 11/6/2019    COLONOSCOPY  Years of education: None    Highest education level: None   Occupational History    Occupation: retired   Social Needs    Financial resource strain: None    Food insecurity     Worry: None     Inability: None    Transportation needs     Medical: None     Non-medical: None   Tobacco Use    Smoking status: Former Smoker     Packs/day: 0.50     Years: 20.00     Pack years: 10.00     Last attempt to quit: 1982     Years since quittin.1    Smokeless tobacco: Former User     Quit date: 1982   Substance and Sexual Activity    Alcohol use: No    Drug use: No    Sexual activity: None   Lifestyle    Physical activity     Days per week: None     Minutes per session: None    Stress: None   Relationships    Social connections     Talks on phone: None     Gets together: None     Attends Gnosticist service: None     Active member of club or organization: None     Attends meetings of clubs or organizations: None     Relationship status: None    Intimate partner violence     Fear of current or ex partner: None     Emotionally abused: None     Physically abused: None     Forced sexual activity: None   Other Topics Concern    None   Social History Narrative    None         REVIEW OF SYSTEMS      Allergies   Allergen Reactions    Iodides Anaphylaxis, Hives and Itching     \"Contrast dyes\"  This was added by someone but Patient states has had IVP dyes multiple times without problems and had a myelogram in Dec 2016 without problems    Povidone-Iodine Anaphylaxis, Hives and Swelling    Sulfa Antibiotics Hives and Swelling    Betadine [Povidone Iodine] Hives    Cephalexin Hives and Swelling    Cephalexin Itching     Other reaction(s): Hallucinations  In  received demerol and keflex together so was told to list both as allergies    Cozaar [Losartan] Nausea Only     Pt also gets sores on toungue    Cymbalta [Duloxetine Hcl] Diarrhea and Nausea And Vomiting     Weakness    Demerol Hives and Swelling    Doxycycline Other (See Comments)     Sore mouth        Meperidine Itching     Other reaction(s): Hallucinations      Morphine Hives and Itching    Pcn [Penicillins] Hives, Swelling and Itching     Other reaction(s): Intolerance-unknown    Zithromax [Azithromycin] Other (See Comments)     Sore mouth      Etodolac     Fluorescein     Iodine      Other reaction(s): Intolerance-unknown    Lisinopril      cough    Penicillin G     Soap     Toviaz [Fesoterodine Fumarate Er]        Current Outpatient Medications on File Prior to Encounter   Medication Sig Dispense Refill    rOPINIRole (REQUIP) 1 MG tablet Take by mouth nightly       glipiZIDE (GLUCOTROL) 5 MG tablet 2.5mg in the am and 2.5 mg in the pm      LIVALO 2 MG TABS tablet Take 2 mg by mouth nightly       nystatin-triamcinolone (MYCOLOG II) 473966-7.1 UNIT/GM-% cream Apply topically 4 times daily Apply topically 4 times daily.       Multiple Vitamins-Minerals (THERAPEUTIC MULTIVITAMIN-MINERALS) tablet Take 1 tablet by mouth daily      Multiple Vitamins-Minerals (PRESERVISION AREDS PO) Take by mouth daily       donepezil (ARICEPT) 10 MG tablet Take 10 mg by mouth nightly       trospium (SANCTURA) 60 MG CP24 extended release capsule TAKE 1 CAPSULE BY MOUTH ONE TIME A DAY 90 capsule 2    estradiol (ESTRACE VAGINAL) 0.1 MG/GM vaginal cream Place 1 g vaginally Twice a Week 1 Tube 5    valsartan (DIOVAN) 40 MG tablet Take 40 mg by mouth daily       dexlansoprazole (DEXILANT) 60 MG CPDR delayed release capsule Take 60 mg by mouth daily 2 hours before bed      vitamin D (ERGOCALCIFEROL) 68289 units CAPS capsule TAKE 1 CAPSULE BY MOUTH ONE TIME A WEEK  4 capsule 1    FARXIGA 10 MG tablet TAKE 1 TABLET BY MOUTH IN THE MORNING  30 tablet 11    CRANBERRY PO Take by mouth 2 times daily      Continuous Blood Gluc Sensor (FREESTYLE DAVIS 14 DAY SENSOR) MISC       Salicylic Acid (COMPOUND W EX)       Continuous Blood Gluc  (FREESTYLE DAVIS 14 DAY READER) MATTHEW        Current Facility-Administered Medications on File Prior to Encounter   Medication Dose Route Frequency Provider Last Rate Last Dose    0.9 % sodium chloride infusion 250 mL  250 mL Intravenous Once Dee Harmon MD            General health:  Fairly good. No fever or chills. Skin:  No itching, redness or rash. HEENT:  No headache, epistaxis or sore throat. Neck:  No pain, stiffness or masses. Cardiovascular/Respiratory system:  No chest pain, palpitation or shortness of breath. Gastrointestinal tract: No abdominal pain, Dysphagia, nausea, vomiting, diarrhea or constipation. Genitourinary:  No burning on micturition. No hesitancy, urgency, frequency or discoloration of urine. Locomotor:  See HPI. Neuropsychiatric:  No referable complaints. GENERAL PHYSICAL EXAM:     Vitals: BP (!) 163/87   Pulse 70   Temp 97.3 °F (36.3 °C) (Infrared)   Resp 20   Ht 4' 11\" (1.499 m)   Wt 156 lb (70.8 kg)   SpO2 99%   BMI 31.51 kg/m²  Body mass index is 31.51 kg/m². GENERAL APPEARANCE:   Noel Chung is 67 y.o. female, not obese, nourished, conscious, alert. Does not appear to be in any distress or pain at this time. SKIN:  Warm, dry, no cyanosis or jaundice. HEAD:  Normocephalic, atraumatic. EYES:  Pupils equal, reactive to light. EARS:  No discharge, no marked hearing loss. NOSE:  No rhinorrhea, epistaxis or septal deformity. THROAT:  Mucus membranes moist, no erythema or exudate. NECK:  No stiffness, trachea central.  No palpable masses or L.N.                 CHEST:  Symmetrical and equal on expansion. HEART:  Hypertensive. RRR S1 > S2. No audible murmurs or gallops.                  LUNGS:  Equal on expansion, normal breath sounds. No wheezing, rhonchi or rales. ABDOMEN:  NABS x 4 quads. Soft on palpation. No localized tenderness. No guarding or rigidity. LYMPHATICS:  No palpable cervical lymphadenopathy. LOCOMOTOR, BACK AND SPINE:  Ambulating with cane. No deformities. EXTREMITIES:  Tenderness on palpation of the left knee joint space worse on the medial and lateral aspect as well as posterior. Tenderness to bilateral hips. Moderate nonpitting edema to BLE. No calf tenderness. No discoloration or ulcerations. NEUROLOGIC:  The patient is conscious, alert, oriented. Speech clear, no facial drooping. No apparent focal sensory or motor deficits.                                                                                      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    Acute bronchitis 05/01/2019    Nausea 10/09/2018    Diarrhea 10/09/2018    Anxiety 10/03/2018    Arthritis 10/03/2018    Unsteadiness 07/05/2018    Urinary retention 10/02/2017    Chronic ITP (idiopathic thrombocytopenia) (HCC) 04/12/2017    S/P lumbar fusion 03/22/2017    Urge incontinence 12/14/2016    Type 2 diabetes mellitus with complication, without long-term current use of insulin ([address] 06/29/2016    Internal hemorrhoid     Tubular adenoma     Colon polyps     Hiatal hernia     Diabetic gastroparesis ([address]     Pure hypercholesterolemia 03/01/2016    Recurrent UTI 11/30/2015    Essential hypertension 10/14/2015    Gastroesophageal reflux disease without esophagitis 10/14/2015    Mixed incontinence 10/14/2015    Bilateral renal cysts 08/31/2015    Acquired cyst of kidney 08/03/2015    Microscopic hematuria 08/03/2015    Age-related cognitive decline     DDD (degenerative disc disease), cervical 07/06/2015  Anemia 01/13/2015    Vitamin D deficiency 09/18/2014    B12 deficiency     Constipation 05/15/2014    Macular degeneration of left eye     Diabetic peripheral neuropathy ([facility] [address] 10/24/2012    TIA (transient ischemic attack)     Chronic back pain            MU Thomas CNP on 6/30/2020 at 12:07 PM

## 2020-06-30 NOTE — H&P (VIEW-ONLY)
HISTORY and [id number]       NAME:  Marion Soto  MRN: [id number]   YOB: 1940   Date: 6/30/2020   Age: 67 y.o. Gender: female       COMPLAINT AND PRESENT HISTORY:     Marion Soto is 67 y.o. female, undergoing preadmission testing for left knee pain with scheduled left total knee arthroplasty revision-poly exchange vs full revision. Pt s/p bilateral BKA with Dr. Abdirashid Cheatham in 2000. Pt had a right TKA revision by Dr. Pao Guerra in 2015. Pt c/o persistent pain, stiffness, cracking to left knee. Describes pain as constant aching. Rating pain 7-10/10. Takes aleve and if really bad 1/2 tramadol with good relief. Weight bearing and sitting makes pain worse. Rest and elevation helps alleviate pain. Pain does wake Pt from sleep. The knee does buckle, give way under the patient. No recent falls or trauma. No redness or rashes. Pain does radiate down from left knee to her left ankle. Pt has history of neuropathy to bilateral feet. Has some swelling to BLE. Cannot recall any specific injury or trauma to knees. No Hx of MRSA infections in the past.    Pt also having left hip pain with history of bursitis. Had cortisone injection earlier this month. Pt also c/o significant pain to right hip. Pain worse with standing, weight bearing. Using cane or walker for ambulation. Pain radiates from left hip to left knee. Pt has history of right repair of femur fracture with hardware in place in 2011. CVA x 2, Pt reports some balance issues. Pt reports minimal right sided residual weakness. DM with last A1C two weeks ago 6.8. Checks BS daily. Takes glipizide daily. History of HTN. Takes diovan daily. Well controlled. Denies any other somatic complaints including chest pain/pressure, palpitations, SOB, recent URI, fever or chills. PAST MEDICAL HISTORY     Past Medical History:   Diagnosis Date    Age-related cognitive decline     Ankle pain     Left ankle fracture in ortho boat.    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    left with urinary incontinenc post surgical    Caffeine use     2 coffee/day, 1-2 tea per week    Chronic back pain     Chronic ITP (idiopathic thrombocytopenia) (HCC)     CVA (cerebral infarction) 2008, 2010    balance issues, short term memory loss    Diabetic gastroparesis ([facility]     Diverticular disease 1986    GERD (gastroesophageal reflux disease)     Hiatal hernia     Hip fracture ([facility] [facility]     History of blood transfusion     History of decreased platelet count     Hyperlipemia     Hypertension     Internal hemorrhoid     Macular degeneration of left eye 1980's    S/P laser treatment    Nausea and vomiting     Neuropathy     Osteoarthritis     Ringing in ears     Self-catheterizes urinary bladder     6-7 times daily    TIA (transient ischemic attack) 2000    x1    Tubular adenoma     colon polyps    Type II or unspecified type diabetes mellitus without mention of complication, not stated as uncontrolled     Urinary incontinence     frequent UTI s/p infection, surgical dilatation lead to incontinence    Wears dentures     full on top, partial on bottom    Wears glasses      Pt denies any history of heart disease, COPD, Asthma, Cancer, Seizures,Thyroid disease, Kidney Disease, Hepatitis, TB, Psychiatric Disorders or Substance abuse.     SURGICAL HISTORY       Past Surgical History:   Procedure Laterality Date    APPENDECTOMY  1962    BLADDER SURGERY      bladder stimulator    BLADDER SUSPENSION  2002    multiple    CARDIAC CATHETERIZATION  2008    no stents    CARDIOVASCULAR STRESS TEST  12/2014    CATARACT REMOVAL WITH IMPLANT Left 11/07/2017    [facility]    CATARACT REMOVAL WITH IMPLANT Right 11/28/2017    [facility]    COLON SURGERY      colostomy reversal    COLONOSCOPY  4/7/16    tubular adenoma x3; internal hemorrhoids    COLONOSCOPY N/A 11/6/2019    COLONOSCOPY DIAGNOSTIC performed by Mariana Chandler MD at [facility]    bowel obstruction/ rupture from diverticular disease, reversal 3 mos later    CYSTOSCOPY  2017    W/ 200IU Botox     FRACTURE SURGERY Right     hip    FRACTURE SURGERY Left     WRIST    FRACTURE SURGERY Left 2013    ankle    HERNIA REPAIR      HYSTERECTOMY  1969    JOINT REPLACEMENT Bilateral 2000    BILAT KNEES    LUMBAR FUSION  2017    L2/L3    WA XCAPSL CTRC RMVL INSJ IO LENS PROSTH W/O ECP Left 2017    EYE CATARACT EMULSIFICATION IOL IMPLANT performed by Lesia Chung MD at [address] [place] [address] W/O ECP Right 2017    EYE CATARACT EMULSIFICATION IOL IMPLANT performed by Lesia Chung MD at [facility] Right 10/27/15    WITH POLY EXCHANGE BIOMET AND GPS APPLICATION    SPINE SURGERY      fusion, did not take   [address]    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    NEAL AND BSO      TONSILLECTOMY      UPPER GASTROINTESTINAL ENDOSCOPY      UPPER GASTROINTESTINAL ENDOSCOPY  14    UPPER GASTROINTESTINAL ENDOSCOPY  2016    mild gastritis    UPPER GASTROINTESTINAL ENDOSCOPY N/A 2018    retained thick secretions in proximal esophagus       FAMILY HISTORY       Family History   Problem Relation Age of Onset    Breast Cancer Mother     Cancer Mother         breast and uterine  39    Heart Disease Father     Heart Attack Father          28   [address] [facility] Maternal Grandmother     Cancer Brother 46        bronchogenic adenocarcinoma cancer    Lung Cancer Brother     Cancer Sister         lung    Lung Cancer Sister          72    Breast Cancer Sister          62    Cancer Sister         breast       SOCIAL HISTORY       Social History     Socioeconomic History    Marital status:      Spouse name: None    Number of children: None  Years of education: None    Highest education level: None   Occupational History    Occupation: retired   Social Needs    Financial resource strain: None    Food insecurity     Worry: None     Inability: None    Transportation needs     Medical: None     Non-medical: None   Tobacco Use    Smoking status: Former Smoker     Packs/day: 0.50     Years: 20.00     Pack years: 10.00     Last attempt to quit: 1982     Years since quittin.1    Smokeless tobacco: Former User     Quit date: 1982   Substance and Sexual Activity    Alcohol use: No    Drug use: No    Sexual activity: None   Lifestyle    Physical activity     Days per week: None     Minutes per session: None    Stress: None   Relationships    Social connections     Talks on phone: None     Gets together: None     Attends Mandaeism service: None     Active member of club or organization: None     Attends meetings of clubs or organizations: None     Relationship status: None    Intimate partner violence     Fear of current or ex partner: None     Emotionally abused: None     Physically abused: None     Forced sexual activity: None   Other Topics Concern    None   Social History Narrative    None         REVIEW OF SYSTEMS      Allergies   Allergen Reactions    Iodides Anaphylaxis, Hives and Itching     \"Contrast dyes\"  This was added by someone but Patient states has had IVP dyes multiple times without problems and had a myelogram in Dec 2016 without problems    Povidone-Iodine Anaphylaxis, Hives and Swelling    Sulfa Antibiotics Hives and Swelling    Betadine [Povidone Iodine] Hives    Cephalexin Hives and Swelling    Cephalexin Itching     Other reaction(s): Hallucinations  In  received demerol and keflex together so was told to list both as allergies    Cozaar [Losartan] Nausea Only     Pt also gets sores on toungue    Cymbalta [Duloxetine Hcl] Diarrhea and Nausea And Vomiting     Weakness    Demerol Hives and Swelling    Doxycycline Other (See Comments)     Sore mouth        Meperidine Itching     Other reaction(s): Hallucinations      Morphine Hives and Itching    Pcn [Penicillins] Hives, Swelling and Itching     Other reaction(s): Intolerance-unknown    Zithromax [Azithromycin] Other (See Comments)     Sore mouth      Etodolac     Fluorescein     Iodine      Other reaction(s): Intolerance-unknown    Lisinopril      cough    Penicillin G     Soap     Toviaz [Fesoterodine Fumarate Er]        Current Outpatient Medications on File Prior to Encounter   Medication Sig Dispense Refill    rOPINIRole (REQUIP) 1 MG tablet Take by mouth nightly       glipiZIDE (GLUCOTROL) 5 MG tablet 2.5mg in the am and 2.5 mg in the pm      LIVALO 2 MG TABS tablet Take 2 mg by mouth nightly       nystatin-triamcinolone (MYCOLOG II) 490754-9.1 UNIT/GM-% cream Apply topically 4 times daily Apply topically 4 times daily.       Multiple Vitamins-Minerals (THERAPEUTIC MULTIVITAMIN-MINERALS) tablet Take 1 tablet by mouth daily      Multiple Vitamins-Minerals (PRESERVISION AREDS PO) Take by mouth daily       donepezil (ARICEPT) 10 MG tablet Take 10 mg by mouth nightly       trospium (SANCTURA) 60 MG CP24 extended release capsule TAKE 1 CAPSULE BY MOUTH ONE TIME A DAY 90 capsule 2    estradiol (ESTRACE VAGINAL) 0.1 MG/GM vaginal cream Place 1 g vaginally Twice a Week 1 Tube 5    valsartan (DIOVAN) 40 MG tablet Take 40 mg by mouth daily       dexlansoprazole (DEXILANT) 60 MG CPDR delayed release capsule Take 60 mg by mouth daily 2 hours before bed      vitamin D (ERGOCALCIFEROL) 34438 units CAPS capsule TAKE 1 CAPSULE BY MOUTH ONE TIME A WEEK  4 capsule 1    FARXIGA 10 MG tablet TAKE 1 TABLET BY MOUTH IN THE MORNING  30 tablet 11    CRANBERRY PO Take by mouth 2 times daily      Continuous Blood Gluc Sensor (FREESTYLE DAVIS 14 DAY SENSOR) MISC       Salicylic Acid (COMPOUND W EX)       Continuous Blood Gluc  (FREESTYLE DAVIS 14 DAY READER) MATTHEW        Current Facility-Administered Medications on File Prior to Encounter   Medication Dose Route Frequency Provider Last Rate Last Dose    0.9 % sodium chloride infusion 250 mL  250 mL Intravenous Once Gloria Sánchez MD            General health:  Fairly good. No fever or chills. Skin:  No itching, redness or rash. HEENT:  No headache, epistaxis or sore throat. Neck:  No pain, stiffness or masses. Cardiovascular/Respiratory system:  No chest pain, palpitation or shortness of breath. Gastrointestinal tract: No abdominal pain, Dysphagia, nausea, vomiting, diarrhea or constipation. Genitourinary:  No burning on micturition. No hesitancy, urgency, frequency or discoloration of urine. Locomotor:  See HPI. Neuropsychiatric:  No referable complaints. GENERAL PHYSICAL EXAM:     Vitals: BP (!) 163/87   Pulse 70   Temp 97.3 °F (36.3 °C) (Infrared)   Resp 20   Ht 4' 11\" (1.499 m)   Wt 156 lb (70.8 kg)   SpO2 99%   BMI 31.51 kg/m²  Body mass index is 31.51 kg/m². GENERAL APPEARANCE:   Manan Ho is 67 y.o. female, not obese, nourished, conscious, alert. Does not appear to be in any distress or pain at this time. SKIN:  Warm, dry, no cyanosis or jaundice. HEAD:  Normocephalic, atraumatic. EYES:  Pupils equal, reactive to light. EARS:  No discharge, no marked hearing loss. NOSE:  No rhinorrhea, epistaxis or septal deformity. THROAT:  Mucus membranes moist, no erythema or exudate. NECK:  No stiffness, trachea central.  No palpable masses or L.N.                 CHEST:  Symmetrical and equal on expansion. HEART:  Hypertensive. RRR S1 > S2. No audible murmurs or gallops.                  LUNGS:  Equal on expansion, normal breath sounds. No wheezing, rhonchi or rales. ABDOMEN:  NABS x 4 quads. Soft on palpation. No localized tenderness. No guarding or rigidity. LYMPHATICS:  No palpable cervical lymphadenopathy. LOCOMOTOR, BACK AND SPINE:  Ambulating with cane. No deformities. EXTREMITIES:  Tenderness on palpation of the left knee joint space worse on the medial and lateral aspect as well as posterior. Tenderness to bilateral hips. Moderate nonpitting edema to BLE. No calf tenderness. No discoloration or ulcerations. NEUROLOGIC:  The patient is conscious, alert, oriented. Speech clear, no facial drooping. No apparent focal sensory or motor deficits.                                                                                      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    Acute bronchitis 05/01/2019    Nausea 10/09/2018    Diarrhea 10/09/2018    Anxiety 10/03/2018    Arthritis 10/03/2018    Unsteadiness 07/05/2018    Urinary retention 10/02/2017    Chronic ITP (idiopathic thrombocytopenia) (HCC) 04/12/2017    S/P lumbar fusion 03/22/2017    Urge incontinence 12/14/2016    Type 2 diabetes mellitus with complication, without long-term current use of insulin ([address] 06/29/2016    Internal hemorrhoid     Tubular adenoma     Colon polyps     Hiatal hernia     Diabetic gastroparesis ([address]     Pure hypercholesterolemia 03/01/2016    Recurrent UTI 11/30/2015    Essential hypertension 10/14/2015    Gastroesophageal reflux disease without esophagitis 10/14/2015    Mixed incontinence 10/14/2015    Bilateral renal cysts 08/31/2015    Acquired cyst of kidney 08/03/2015    Microscopic hematuria 08/03/2015    Age-related cognitive decline     DDD (degenerative disc disease), cervical 07/06/2015  Anemia 01/13/2015    Vitamin D deficiency 09/18/2014    B12 deficiency     Constipation 05/15/2014    Macular degeneration of left eye     Diabetic peripheral neuropathy ([facility] 10/24/2012    TIA (transient ischemic attack)     Chronic back pain            MU Soler - CNP on 6/30/2020 at 12:07 PM

## 2020-07-01 ENCOUNTER — ANESTHESIA EVENT (OUTPATIENT)
Dept: OPERATING ROOM | Age: 80
DRG: 489 | End: 2020-07-01
Payer: MEDICARE

## 2020-07-01 LAB
EKG ATRIAL RATE: 63 BPM
EKG P AXIS: 17 DEGREES
EKG P-R INTERVAL: 146 MS
EKG Q-T INTERVAL: 418 MS
EKG QRS DURATION: 84 MS
EKG QTC CALCULATION (BAZETT): 427 MS
EKG R AXIS: -32 DEGREES
EKG T AXIS: 25 DEGREES
EKG VENTRICULAR RATE: 63 BPM

## 2020-07-01 RX ORDER — LIDOCAINE HYDROCHLORIDE 10 MG/ML
1 INJECTION, SOLUTION EPIDURAL; INFILTRATION; INTRACAUDAL; PERINEURAL
Status: CANCELLED | OUTPATIENT
Start: 2020-07-01 | End: 2020-07-01

## 2020-07-01 RX ORDER — SODIUM CHLORIDE 9 MG/ML
INJECTION, SOLUTION INTRAVENOUS CONTINUOUS
Status: CANCELLED | OUTPATIENT
Start: 2020-07-01

## 2020-07-01 RX ORDER — SODIUM CHLORIDE 0.9 % (FLUSH) 0.9 %
10 SYRINGE (ML) INJECTION EVERY 12 HOURS SCHEDULED
Status: CANCELLED | OUTPATIENT
Start: 2020-07-01

## 2020-07-01 RX ORDER — SODIUM CHLORIDE 0.9 % (FLUSH) 0.9 %
10 SYRINGE (ML) INJECTION PRN
Status: CANCELLED | OUTPATIENT
Start: 2020-07-01

## 2020-07-01 ASSESSMENT — PROMIS GLOBAL HEALTH SCALE
TO WHAT EXTENT ARE YOU ABLE TO CARRY OUT YOUR EVERYDAY PHYSICAL ACTIVITIES SUCH AS WALKING, CLIMBING STAIRS, CARRYING GROCERIES, OR MOVING A CHAIR [ON A SCALE OF 1 (NOT AT ALL) TO 5 (COMPLETELY)]?: 2
IN GENERAL, WOULD YOU SAY YOUR QUALITY OF LIFE IS...[ON A SCALE OF 1 (POOR) TO 5 (EXCELLENT)]: 4
HOW IS THE PROMIS V1.1 BEING ADMINISTERED?: 2
IN THE PAST 7 DAYS, HOW OFTEN HAVE YOU BEEN BOTHERED BY EMOTIONAL PROBLEMS, SUCH AS FEELING ANXIOUS, DEPRESSED, OR IRRITABLE [ON A SCALE FROM 1 (NEVER) TO 5 (ALWAYS)]?: 3
IN GENERAL, WOULD YOU SAY YOUR HEALTH IS...[ON A SCALE OF 1 (POOR) TO 5 (EXCELLENT)]: 4
IN THE PAST 7 DAYS, HOW WOULD YOU RATE YOUR FATIGUE ON AVERAGE [ON A SCALE FROM 1 (NONE) TO 5 (VERY SEVERE)]?: 3
WHO IS THE PERSON COMPLETING THE PROMIS V1.1 SURVEY?: 0
IN GENERAL, PLEASE RATE HOW WELL YOU CARRY OUT YOUR USUAL SOCIAL ACTIVITIES (INCLUDES ACTIVITIES AT HOME, AT WORK, AND IN YOUR COMMUNITY, AND RESPONSIBILITIES AS A PARENT, CHILD, SPOUSE, EMPLOYEE, FRIEND, ETC) [ON A SCALE OF 1 (POOR) TO 5 (EXCELLENT)]?: 1
IN GENERAL, HOW WOULD YOU RATE YOUR MENTAL HEALTH, INCLUDING YOUR MOOD AND YOUR ABILITY TO THINK [ON A SCALE OF 1 (POOR) TO 5 (EXCELLENT)]?: 4
IN GENERAL, HOW WOULD YOU RATE YOUR PHYSICAL HEALTH [ON A SCALE OF 1 (POOR) TO 5 (EXCELLENT)]?: 4
IN THE PAST 7 DAYS, HOW WOULD YOU RATE YOUR PAIN ON AVERAGE [ON A SCALE FROM 0 (NO PAIN) TO 10 (WORST IMAGINABLE PAIN)]?: 10
IN GENERAL, HOW WOULD YOU RATE YOUR SATISFACTION WITH YOUR SOCIAL ACTIVITIES AND RELATIONSHIPS [ON A SCALE OF 1 (POOR) TO 5 (EXCELLENT)]?: 5

## 2020-07-01 ASSESSMENT — KOOS JR
TWISING OR PIVOTING ON KNEE: 3
HOW SEVERE IS YOUR KNEE STIFFNESS AFTER FIRST WAKING IN MORNING: 3
RISING FROM SITTING: 2
STANDING UPRIGHT: 3
GOING UP OR DOWN STAIRS: 3
BENDING TO THE FLOOR TO PICK UP OBJECT: 3
STRAIGHTENING KNEE FULLY: 3

## 2020-07-02 ENCOUNTER — OFFICE VISIT (OUTPATIENT)
Dept: PRIMARY CARE CLINIC | Age: 80
End: 2020-07-02
Payer: MEDICARE

## 2020-07-02 ENCOUNTER — HOSPITAL ENCOUNTER (OUTPATIENT)
Age: 80
Setting detail: SPECIMEN
Discharge: HOME | End: 2020-07-02
Payer: MEDICARE

## 2020-07-02 VITALS
WEIGHT: 163.6 LBS | HEART RATE: 75 BPM | OXYGEN SATURATION: 98 % | DIASTOLIC BLOOD PRESSURE: 78 MMHG | TEMPERATURE: 97.2 F | BODY MASS INDEX: 33.04 KG/M2 | SYSTOLIC BLOOD PRESSURE: 124 MMHG

## 2020-07-02 LAB
MAGNESIUM: 2.4 MG/DL (ref 1.6–2.6)
VITAMIN B-12: 1941 PG/ML (ref 232–1245)
VITAMIN D 25-HYDROXY: 21.2 NG/ML (ref 30–100)

## 2020-07-02 PROCEDURE — 1036F TOBACCO NON-USER: CPT | Performed by: FAMILY MEDICINE

## 2020-07-02 PROCEDURE — 1090F PRES/ABSN URINE INCON ASSESS: CPT | Performed by: FAMILY MEDICINE

## 2020-07-02 PROCEDURE — G8400 PT W/DXA NO RESULTS DOC: HCPCS | Performed by: FAMILY MEDICINE

## 2020-07-02 PROCEDURE — 1123F ACP DISCUSS/DSCN MKR DOCD: CPT | Performed by: FAMILY MEDICINE

## 2020-07-02 PROCEDURE — G8427 DOCREV CUR MEDS BY ELIG CLIN: HCPCS | Performed by: FAMILY MEDICINE

## 2020-07-02 PROCEDURE — G8417 CALC BMI ABV UP PARAM F/U: HCPCS | Performed by: FAMILY MEDICINE

## 2020-07-02 PROCEDURE — 99214 OFFICE O/P EST MOD 30 MIN: CPT | Performed by: FAMILY MEDICINE

## 2020-07-02 PROCEDURE — 4040F PNEUMOC VAC/ADMIN/RCVD: CPT | Performed by: FAMILY MEDICINE

## 2020-07-02 ASSESSMENT — ENCOUNTER SYMPTOMS
BACK PAIN: 1
GASTROINTESTINAL NEGATIVE: 1
RESPIRATORY NEGATIVE: 1

## 2020-07-02 NOTE — PATIENT INSTRUCTIONS
Patient Education        Restless Legs Syndrome: Care Instructions  Your Care Instructions  Restless legs syndrome is a common nervous system problem. People with this syndrome feel a creeping, achy, or unpleasant feeling in the legs and an overpowering urge to move them. It often occurs in the evening and at night and can lead to sleep problems and tiredness. Your doctor may suggest doing a study of your sleep patterns to figure out what is happening when you try to sleep. Many people get relief from symptoms when they get regular exercise, eat well, and avoid caffeine, alcohol, and tobacco.  Follow-up care is a key part of your treatment and safety. Be sure to make and go to all appointments, and call your doctor if you are having problems. It's also a good idea to know your test results and keep a list of the medicines you take. How can you care for yourself at home? · Take your medicines exactly as prescribed. Call your doctor if you think you are having a problem with your medicine. · Try bathing in hot or cold water. Applying a heating pad or ice bag to your legs may also help symptoms. · Stretch and massage your legs before bed or when discomfort begins. · Get some exercise for at least 30 minutes a day on most days of the week. Stop exercising at least 3 hours before bedtime. · Try to plan for situations where you will need to remain seated for long stretches. For example, if you are traveling by car, plan some stops so you can get out and walk around. · Tell your doctor about any medicines you are taking. This includes all over-the-counter, prescription, and herbal medicines. Some medicines, such as antidepressants, antihistamines, and cold and sinus medicines, can make your symptoms worse. · Avoid caffeine products, such as coffee, tea, cola, and chocolate. Caffeine can interrupt your sleep and stimulate you. · Do not smoke. Nicotine can make restless legs worse.  If you need help quitting, talk to your doctor about stop-smoking programs and medicines. These can increase your chances of quitting for good. · Do not drink alcohol late in the evening. Take steps to help you sleep better  · Get plenty of sunlight in the outdoors, particularly later in the afternoon. · Use the evening hours for settling down. Avoid activities that challenge you in the hours before bedtime. · Eat meals at regular times, and do not snack before bedtime. · Keep your bedroom quiet, dark, and cool. Try using a sleep mask and earplugs to help you sleep. · Limit how much you drink at night to reduce your need to get up to urinate. But do not go to bed thirsty. · Run a fan or other steady \"white noise\" during the night if noises wake you up. · [place] the bed for sleeping and sex. Do your reading or TV watching in another room. · Once you are in bed, relax from head to toe, and guide your mind to pleasant thoughts. · Do not stay in bed longer than 8 hours, and try to avoid naps. When should you call for help? Watch closely for changes in your health, and be sure to contact your doctor if:  · You are still not getting enough sleep. · Your symptoms become more severe or happen more often. Where can you learn more? Go to [email or web address].[email or web address] org and sign in to your [employer] account. Enter P016 in the [facility] box to learn more about \"Restless Legs Syndrome: Care Instructions. \"     If you do not have an account, please click on the \"Sign Up Now\" link. Current as of: November 20, 2019               Content Version: 12.5  © 3590-5932 [employer]. Care instructions adapted under license by [facility] ([facility]). If you have questions about a medical condition or this instruction, always ask your healthcare professional. [address] any warranty or liability for your use of this information.

## 2020-07-02 NOTE — PROGRESS NOTES
Hypertension     Internal hemorrhoid     Macular degeneration of left eye 1980's    S/P laser treatment    Nausea and vomiting     Neuropathy     Osteoarthritis     Ringing in ears     Self-catheterizes urinary bladder     6-7 times daily    TIA (transient ischemic attack) 2000    x1    Tubular adenoma     colon polyps    Type II or unspecified type diabetes mellitus without mention of complication, not stated as uncontrolled     Urinary incontinence     frequent UTI s/p infection, surgical dilatation lead to incontinence    Wears dentures     full on top, partial on bottom    Wears glasses       Past Surgical History:   Procedure Laterality Date    APPENDECTOMY  1962    BLADDER SURGERY      bladder stimulator    BLADDER SUSPENSION  2002    multiple    CARDIAC CATHETERIZATION  2008    no stents    CARDIOVASCULAR STRESS TEST  12/2014    CATARACT REMOVAL WITH IMPLANT Left 11/07/2017    [facility]    CATARACT REMOVAL WITH IMPLANT Right 11/28/2017    [facility]    COLON SURGERY      colostomy reversal    COLONOSCOPY  4/7/16    tubular adenoma x3; internal hemorrhoids    COLONOSCOPY N/A 11/6/2019    COLONOSCOPY DIAGNOSTIC performed by Son Abbott MD at [address] [place] [address]    bowel obstruction/ rupture from diverticular disease, reversal 3 mos later    CYSTOSCOPY  09/08/2017    W/ 200IU Botox     FRACTURE SURGERY Right 2011    hip    FRACTURE SURGERY Left 2001    WRIST    FRACTURE SURGERY Left 2013    ankle    HERNIA REPAIR      HYSTERECTOMY  1969    JOINT REPLACEMENT Bilateral 2000    BILAT KNEES    LUMBAR FUSION  2017    L2/L3    AR XCAPSL CTRC RMVL INSJ IO LENS PROSTH W/O ECP Left 11/7/2017    EYE CATARACT EMULSIFICATION IOL IMPLANT performed by Chris Centeno MD at [facility] 60 RMVL INSJ IO LENS PROSTH W/O ECP Right 11/28/2017    EYE CATARACT EMULSIFICATION IOL IMPLANT performed by Chris Centeno MD at [place] [address]. 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    NEAL AND BSO      TONSILLECTOMY      UPPER GASTROINTESTINAL ENDOSCOPY      UPPER GASTROINTESTINAL ENDOSCOPY  14    UPPER GASTROINTESTINAL ENDOSCOPY  2016    mild gastritis    UPPER GASTROINTESTINAL ENDOSCOPY N/A 2018    retained thick secretions in proximal esophagus       Family History   Problem Relation Age of Onset    Breast Cancer Mother     Cancer Mother         breast and uterine  39    Heart Disease Father     Heart Attack Father          28   [address] [facility] Maternal Grandmother     Cancer Brother 46        bronchogenic adenocarcinoma cancer    Lung Cancer Brother     Cancer Sister         lung    Lung Cancer Sister          72    Breast Cancer Sister          62    Cancer Sister         breast       Social History     Tobacco Use    Smoking status: Former Smoker     Packs/day: 0.50     Years: 20.00     Pack years: 10.00     Last attempt to quit: 1982     Years since quittin.1    Smokeless tobacco: Former User     Quit date: 1982   Substance Use Topics    Alcohol use: No      Current Outpatient Medications   Medication Sig Dispense Refill    nystatin-triamcinolone (MYCOLOG II) 350736-0.1 UNIT/GM-% cream Apply topically 4 times daily Apply topically 4 times daily.  30 g 3    rOPINIRole (REQUIP) 1 MG tablet Take by mouth nightly       glipiZIDE (GLUCOTROL) 5 MG tablet 2.5mg in the am and 2.5 mg in the pm      Continuous Blood Gluc Sensor (FREESTYLE DAVIS 14 DAY SENSOR) MISC       Salicylic Acid (COMPOUND W EX)       LIVALO 2 MG TABS tablet Take 2 mg by mouth nightly       Multiple Vitamins-Minerals (THERAPEUTIC MULTIVITAMIN-MINERALS) tablet Take 1 tablet by mouth daily      Multiple Vitamins-Minerals (PRESERVISION AREDS PO) Take by mouth daily       Continuous Blood Gluc  (FREESTYLE DAVIS 14 DAY READER) MATTHEW       donepezil (ARICEPT) 10 MG tablet Take 10 mg by mouth nightly       trospium (SANCTURA) 60 MG CP24 extended release capsule TAKE 1 CAPSULE BY MOUTH ONE TIME A DAY 90 capsule 2    estradiol (ESTRACE VAGINAL) 0.1 MG/GM vaginal cream Place 1 g vaginally Twice a Week 1 Tube 5    valsartan (DIOVAN) 40 MG tablet Take 40 mg by mouth daily       dexlansoprazole (DEXILANT) 60 MG CPDR delayed release capsule Take 60 mg by mouth daily 2 hours before bed      vitamin D (ERGOCALCIFEROL) 98871 units CAPS capsule TAKE 1 CAPSULE BY MOUTH ONE TIME A WEEK  4 capsule 1    FARXIGA 10 MG tablet TAKE 1 TABLET BY MOUTH IN THE MORNING  30 tablet 11    CRANBERRY PO Take by mouth 2 times daily       No current facility-administered medications for this visit.       Facility-Administered Medications Ordered in Other Visits   Medication Dose Route Frequency Provider Last Rate Last Dose    0.9 % sodium chloride infusion 250 mL  250 mL Intravenous Once Marcelino Alejandre MD         Allergies   Allergen Reactions    Iodides Anaphylaxis, Hives and Itching     \"Contrast dyes\"  This was added by someone but Patient states has had IVP dyes multiple times without problems and had a myelogram in Dec 2016 without problems    Povidone-Iodine Anaphylaxis, Hives and Swelling    Sulfa Antibiotics Hives and Swelling    Betadine [Povidone Iodine] Hives    Cephalexin Hives and Swelling    Cephalexin Itching     Other reaction(s): Hallucinations  In 1969 received demerol and keflex together so was told to list both as allergies    Cozaar [Losartan] Nausea Only     Pt also gets sores on toungue    Cymbalta [Duloxetine Hcl] Diarrhea and Nausea And Vomiting     Weakness    Demerol Hives and Swelling    Doxycycline Other (See Comments)     Sore mouth        Meperidine Itching     Other reaction(s): Hallucinations      Morphine Hives and Itching    Pcn [Penicillins] Hives, Swelling and Itching     Other reaction(s): Intolerance-unknown    Zithromax [Azithromycin] Other (See Comments)     Sore mouth      Etodolac     Fluorescein     Iodine      Other reaction(s): Intolerance-unknown    Lisinopril      cough    Penicillin G     Soap     Toviaz [Fesoterodine Fumarate Er]     Metformin And Related Nausea And Vomiting     Diarrhea and N/V       Health Maintenance   Topic Date Due    Shingles Vaccine (2 of 2) 01/22/2019    Annual Wellness Visit (AWV)  06/19/2019    Lipid screen  10/17/2019    DTaP/Tdap/Td vaccine (1 - Tdap) 01/09/2022 (Originally 8/12/1959)    Flu vaccine (1) 09/01/2020    Potassium monitoring  06/30/2021    Creatinine monitoring  06/30/2021    Pneumococcal 65+ years Vaccine  Completed    DEXA (modify frequency per FRAX score)  Addressed    Hepatitis A vaccine  Aged Out    Hib vaccine  Aged Out    Meningococcal (ACWY) vaccine  Aged Out       Subjective:      Review of Systems   Constitutional: Negative. Respiratory: Negative. Cardiovascular: Negative. Gastrointestinal: Negative. Genitourinary:        Has to self cath several times a day. Had several bladder surgeries and had damage to the system. Has chronic urine incontinence. Musculoskeletal: Positive for arthralgias and back pain. Lower legs hypersensitive to touch since table fell on them a year ago. Neurological: Positive for dizziness (chronic). off metformin has helped the diarrhea and nausea. Objective:     /78   Pulse 75   Temp 97.2 °F (36.2 °C)   Wt 163 lb 9.6 oz (74.2 kg)   SpO2 98%   BMI 33.04 kg/m²   Physical Exam  Vitals signs and nursing note reviewed. Constitutional:       General: She is not in acute distress. Appearance: She is well-developed. She is not ill-appearing. HENT:      Head: Normocephalic and atraumatic.       Right Ear: Tympanic membrane and ear canal normal.      Left Ear: Tympanic membrane and ear canal normal.      Mouth/Throat:      Mouth: Mucous membranes are moist.   Eyes:      General: No scleral icterus. Right eye: No discharge. Left eye: No discharge. Conjunctiva/sclera: Conjunctivae normal.      Pupils: Pupils are equal, round, and reactive to light. Neck:      Thyroid: No thyromegaly. Trachea: No tracheal deviation. Cardiovascular:      Rate and Rhythm: Normal rate and regular rhythm. Pulses:           Dorsalis pedis pulses are 0 on the right side and 0 on the left side. Heart sounds: Normal heart sounds. Comments: No carotid bruits  Pulmonary:      Effort: Pulmonary effort is normal. No respiratory distress. Breath sounds: Normal breath sounds. No wheezing. Musculoskeletal:      Right lower leg: No edema. Left lower leg: No edema. Lymphadenopathy:      Cervical: No cervical adenopathy. Skin:     General: Skin is warm. Findings: No rash. Neurological:      Mental Status: She is alert and oriented to person, place, and time. Psychiatric:         Mood and Affect: Mood normal.         Behavior: Behavior normal.         Thought Content: Thought content normal.         Assessment:       Diagnosis Orders   1. Leg cramps  Magnesium   2. Essential hypertension  Lipid Panel   3. Chronic ITP (idiopathic thrombocytopenia) (HCC)     4. Rash  nystatin-triamcinolone (MYCOLOG II) 779225-8.1 UNIT/GM-% cream   5. B12 deficiency  Vitamin B12   6. Vitamin D deficiency  Vitamin D 25 Hydroxy   7. Diabetic peripheral neuropathy ([facility] [address]  Lipid Panel        Plan:      No follow-ups on file. She should talk to Neurologist about increasing requip for her leg cramps if the magnesium level is normal  Reviewed labs and pre op tests  May need platelet transfusion if there is too much bleeding. Check B12, Vit D and Magnesiusm. Is moderate surgical risk due to co morbid conditions and age.  May need platelets due to chronic low platelets. Orders Placed This Encounter   Procedures    Magnesium     Standing Status:   Future     Standing Expiration Date:   7/2/2021    Vitamin B12     Standing Status:   Future     Standing Expiration Date:   7/2/2021    Vitamin D 25 Hydroxy     Standing Status:   Future     Standing Expiration Date:   7/2/2021    Lipid Panel     Standing Status:   Future     Standing Expiration Date:   7/2/2021     Order Specific Question:   Is Patient Fasting?/# of Hours     Answer:   yes     Orders Placed This Encounter   Medications    nystatin-triamcinolone (MYCOLOG II) 710119-7.6 UNIT/GM-% cream     Sig: Apply topically 4 times daily Apply topically 4 times daily. Dispense:  30 g     Refill:  3       Patient given educational materials - see patient instructions. Discussed use, benefit, and side effects of prescribed medications. All patient questions answered. Pt voiced understanding. Reviewed health maintenance. Instructed to continue current medications, diet and exercise. Patient agreed with treatment plan. Follow up as directed.      Electronicallysigned by Christine Ghosh MD on 7/2/2020 at 10:05 AM

## 2020-07-06 ENCOUNTER — HOSPITAL ENCOUNTER (OUTPATIENT)
Age: 80
Setting detail: SPECIMEN
Discharge: HOME | End: 2020-07-06
Payer: MEDICARE

## 2020-07-06 LAB
CHOLESTEROL/HDL RATIO: 3
CHOLESTEROL: 169 MG/DL
HDLC SERPL-MCNC: 56 MG/DL
LDL CHOLESTEROL: 85 MG/DL (ref 0–130)
TRIGL SERPL-MCNC: 139 MG/DL
VLDLC SERPL CALC-MCNC: NORMAL MG/DL (ref 1–30)

## 2020-07-06 RX ORDER — IRBESARTAN 75 MG/1
75 TABLET ORAL DAILY
Qty: 30 TABLET | Refills: 3 | Status: SHIPPED | OUTPATIENT
Start: 2020-07-06 | End: 2020-12-22

## 2020-07-10 ENCOUNTER — HOSPITAL ENCOUNTER (OUTPATIENT)
Dept: PREADMISSION TESTING | Age: 80
Setting detail: SPECIMEN
Discharge: HOME | End: 2020-07-14
Payer: MEDICARE

## 2020-07-10 PROCEDURE — U0004 COV-19 TEST NON-CDC HGH THRU: HCPCS

## 2020-07-11 LAB
SARS-COV-2, PCR: NOT DETECTED
SARS-COV-2, RAPID: NORMAL
SARS-COV-2: NORMAL
SOURCE: NORMAL

## 2020-07-12 ENCOUNTER — TELEPHONE (OUTPATIENT)
Dept: PRIMARY CARE CLINIC | Age: 80
End: 2020-07-12

## 2020-07-14 ENCOUNTER — APPOINTMENT (OUTPATIENT)
Dept: GENERAL RADIOLOGY | Age: 80
DRG: 489 | End: 2020-07-14
Attending: ORTHOPAEDIC SURGERY
Payer: MEDICARE

## 2020-07-14 ENCOUNTER — HOSPITAL ENCOUNTER (INPATIENT)
Age: 80
LOS: 1 days | Discharge: HOME | DRG: 489 | End: 2020-07-14
Attending: ORTHOPAEDIC SURGERY | Admitting: ORTHOPAEDIC SURGERY
Payer: MEDICARE

## 2020-07-14 ENCOUNTER — ANESTHESIA (OUTPATIENT)
Dept: OPERATING ROOM | Age: 80
DRG: 489 | End: 2020-07-14
Payer: MEDICARE

## 2020-07-14 VITALS
OXYGEN SATURATION: 94 % | HEIGHT: 59 IN | WEIGHT: 160 LBS | BODY MASS INDEX: 32.25 KG/M2 | DIASTOLIC BLOOD PRESSURE: 68 MMHG | RESPIRATION RATE: 16 BRPM | SYSTOLIC BLOOD PRESSURE: 142 MMHG | TEMPERATURE: 98.2 F | HEART RATE: 59 BPM

## 2020-07-14 VITALS — SYSTOLIC BLOOD PRESSURE: 155 MMHG | DIASTOLIC BLOOD PRESSURE: 68 MMHG | OXYGEN SATURATION: 99 % | TEMPERATURE: 96.6 F

## 2020-07-14 PROBLEM — M17.12 PRIMARY OSTEOARTHRITIS OF LEFT KNEE: Status: ACTIVE | Noted: 2020-07-14

## 2020-07-14 LAB
GLUCOSE BLD-MCNC: 118 MG/DL (ref 65–105)
GLUCOSE BLD-MCNC: 178 MG/DL (ref 65–105)

## 2020-07-14 PROCEDURE — 73560 X-RAY EXAM OF KNEE 1 OR 2: CPT

## 2020-07-14 PROCEDURE — 3700000000 HC ANESTHESIA ATTENDED CARE: Performed by: ORTHOPAEDIC SURGERY

## 2020-07-14 PROCEDURE — 2709999900 HC NON-CHARGEABLE SUPPLY: Performed by: ORTHOPAEDIC SURGERY

## 2020-07-14 PROCEDURE — 2500000003 HC RX 250 WO HCPCS: Performed by: NURSE ANESTHETIST, CERTIFIED REGISTERED

## 2020-07-14 PROCEDURE — 88300 SURGICAL PATH GROSS: CPT

## 2020-07-14 PROCEDURE — 3600000003 HC SURGERY LEVEL 3 BASE: Performed by: ORTHOPAEDIC SURGERY

## 2020-07-14 PROCEDURE — 3600000013 HC SURGERY LEVEL 3 ADDTL 15MIN: Performed by: ORTHOPAEDIC SURGERY

## 2020-07-14 PROCEDURE — 3700000001 HC ADD 15 MINUTES (ANESTHESIA): Performed by: ORTHOPAEDIC SURGERY

## 2020-07-14 PROCEDURE — C9290 INJ, BUPIVACAINE LIPOSOME: HCPCS | Performed by: ANESTHESIOLOGY

## 2020-07-14 PROCEDURE — 6370000000 HC RX 637 (ALT 250 FOR IP): Performed by: ORTHOPAEDIC SURGERY

## 2020-07-14 PROCEDURE — 97166 OT EVAL MOD COMPLEX 45 MIN: CPT

## 2020-07-14 PROCEDURE — 97530 THERAPEUTIC ACTIVITIES: CPT

## 2020-07-14 PROCEDURE — C1776 JOINT DEVICE (IMPLANTABLE): HCPCS | Performed by: ORTHOPAEDIC SURGERY

## 2020-07-14 PROCEDURE — 6360000002 HC RX W HCPCS: Performed by: ORTHOPAEDIC SURGERY

## 2020-07-14 PROCEDURE — 6360000002 HC RX W HCPCS: Performed by: NURSE ANESTHETIST, CERTIFIED REGISTERED

## 2020-07-14 PROCEDURE — 2580000003 HC RX 258: Performed by: ANESTHESIOLOGY

## 2020-07-14 PROCEDURE — 64447 NJX AA&/STRD FEMORAL NRV IMG: CPT | Performed by: ANESTHESIOLOGY

## 2020-07-14 PROCEDURE — 2720000010 HC SURG SUPPLY STERILE: Performed by: ORTHOPAEDIC SURGERY

## 2020-07-14 PROCEDURE — 7100000001 HC PACU RECOVERY - ADDTL 15 MIN: Performed by: ORTHOPAEDIC SURGERY

## 2020-07-14 PROCEDURE — 6360000002 HC RX W HCPCS: Performed by: ANESTHESIOLOGY

## 2020-07-14 PROCEDURE — 82947 ASSAY GLUCOSE BLOOD QUANT: CPT

## 2020-07-14 PROCEDURE — 2500000003 HC RX 250 WO HCPCS: Performed by: ORTHOPAEDIC SURGERY

## 2020-07-14 PROCEDURE — 97116 GAIT TRAINING THERAPY: CPT

## 2020-07-14 PROCEDURE — 97535 SELF CARE MNGMENT TRAINING: CPT

## 2020-07-14 PROCEDURE — 0SPD09Z REMOVAL OF LINER FROM LEFT KNEE JOINT, OPEN APPROACH: ICD-10-PCS | Performed by: ORTHOPAEDIC SURGERY

## 2020-07-14 PROCEDURE — 1200000000 HC SEMI PRIVATE

## 2020-07-14 PROCEDURE — 27486 REVISE/REPLACE KNEE JOINT: CPT | Performed by: ORTHOPAEDIC SURGERY

## 2020-07-14 PROCEDURE — 0SUW09Z SUPPLEMENT LEFT KNEE JOINT, TIBIAL SURFACE WITH LINER, OPEN APPROACH: ICD-10-PCS | Performed by: ORTHOPAEDIC SURGERY

## 2020-07-14 PROCEDURE — 97110 THERAPEUTIC EXERCISES: CPT

## 2020-07-14 PROCEDURE — 7100000000 HC PACU RECOVERY - FIRST 15 MIN: Performed by: ORTHOPAEDIC SURGERY

## 2020-07-14 PROCEDURE — 3E0T3BZ INTRODUCTION OF ANESTHETIC AGENT INTO PERIPHERAL NERVES AND PLEXI, PERCUTANEOUS APPROACH: ICD-10-PCS | Performed by: ORTHOPAEDIC SURGERY

## 2020-07-14 PROCEDURE — 2500000003 HC RX 250 WO HCPCS: Performed by: ANESTHESIOLOGY

## 2020-07-14 PROCEDURE — 2580000003 HC RX 258: Performed by: ORTHOPAEDIC SURGERY

## 2020-07-14 PROCEDURE — 97162 PT EVAL MOD COMPLEX 30 MIN: CPT

## 2020-07-14 DEVICE — COMPONENT TIB KNEE LOK BAR COMPLT SYS VANGUARD: Type: IMPLANTABLE DEVICE | Site: KNEE | Status: FUNCTIONAL

## 2020-07-14 RX ORDER — LIDOCAINE HYDROCHLORIDE 10 MG/ML
INJECTION, SOLUTION EPIDURAL; INFILTRATION; INTRACAUDAL; PERINEURAL PRN
Status: DISCONTINUED | OUTPATIENT
Start: 2020-07-14 | End: 2020-07-14 | Stop reason: SDUPTHER

## 2020-07-14 RX ORDER — FENTANYL CITRATE 50 UG/ML
INJECTION, SOLUTION INTRAMUSCULAR; INTRAVENOUS PRN
Status: DISCONTINUED | OUTPATIENT
Start: 2020-07-14 | End: 2020-07-14 | Stop reason: SDUPTHER

## 2020-07-14 RX ORDER — SUCCINYLCHOLINE/SOD CL,ISO/PF 200MG/10ML
SYRINGE (ML) INTRAVENOUS PRN
Status: DISCONTINUED | OUTPATIENT
Start: 2020-07-14 | End: 2020-07-14 | Stop reason: SDUPTHER

## 2020-07-14 RX ORDER — BUPIVACAINE HYDROCHLORIDE 5 MG/ML
INJECTION, SOLUTION EPIDURAL; INTRACAUDAL
Status: DISCONTINUED | OUTPATIENT
Start: 2020-07-14 | End: 2020-07-14 | Stop reason: SDUPTHER

## 2020-07-14 RX ORDER — FENTANYL CITRATE 50 UG/ML
25 INJECTION, SOLUTION INTRAMUSCULAR; INTRAVENOUS
Status: DISCONTINUED | OUTPATIENT
Start: 2020-07-14 | End: 2020-07-14

## 2020-07-14 RX ORDER — LABETALOL 20 MG/4 ML (5 MG/ML) INTRAVENOUS SYRINGE
5 EVERY 10 MIN PRN
Status: DISCONTINUED | OUTPATIENT
Start: 2020-07-14 | End: 2020-07-14 | Stop reason: HOSPADM

## 2020-07-14 RX ORDER — ASPIRIN 81 MG/1
81 TABLET ORAL 2 TIMES DAILY
Qty: 30 TABLET | Refills: 3 | Status: SHIPPED | OUTPATIENT
Start: 2020-07-14

## 2020-07-14 RX ORDER — SCOLOPAMINE TRANSDERMAL SYSTEM 1 MG/1
1 PATCH, EXTENDED RELEASE TRANSDERMAL ONCE
Status: DISCONTINUED | OUTPATIENT
Start: 2020-07-14 | End: 2020-07-14

## 2020-07-14 RX ORDER — OXYCODONE HYDROCHLORIDE 5 MG/1
5 TABLET ORAL EVERY 4 HOURS PRN
Qty: 40 TABLET | Refills: 0 | Status: SHIPPED | OUTPATIENT
Start: 2020-07-14 | End: 2020-07-21

## 2020-07-14 RX ORDER — SODIUM CHLORIDE 9 MG/ML
125 INJECTION, SOLUTION INTRAVENOUS CONTINUOUS
Status: DISCONTINUED | OUTPATIENT
Start: 2020-07-14 | End: 2020-07-14

## 2020-07-14 RX ORDER — EPHEDRINE SULFATE/0.9% NACL/PF 50 MG/5 ML
SYRINGE (ML) INTRAVENOUS PRN
Status: DISCONTINUED | OUTPATIENT
Start: 2020-07-14 | End: 2020-07-14 | Stop reason: SDUPTHER

## 2020-07-14 RX ORDER — ONDANSETRON 2 MG/ML
INJECTION INTRAMUSCULAR; INTRAVENOUS PRN
Status: DISCONTINUED | OUTPATIENT
Start: 2020-07-14 | End: 2020-07-14 | Stop reason: SDUPTHER

## 2020-07-14 RX ORDER — ONDANSETRON 2 MG/ML
4 INJECTION INTRAMUSCULAR; INTRAVENOUS
Status: DISCONTINUED | OUTPATIENT
Start: 2020-07-14 | End: 2020-07-14 | Stop reason: HOSPADM

## 2020-07-14 RX ORDER — SODIUM CHLORIDE 0.9 % (FLUSH) 0.9 %
10 SYRINGE (ML) INJECTION EVERY 12 HOURS SCHEDULED
Status: DISCONTINUED | OUTPATIENT
Start: 2020-07-14 | End: 2020-07-14 | Stop reason: HOSPADM

## 2020-07-14 RX ORDER — GABAPENTIN 600 MG/1
300 TABLET ORAL ONCE
Status: COMPLETED | OUTPATIENT
Start: 2020-07-14 | End: 2020-07-14

## 2020-07-14 RX ORDER — ACETAMINOPHEN 500 MG
1000 TABLET ORAL ONCE
Status: COMPLETED | OUTPATIENT
Start: 2020-07-14 | End: 2020-07-14

## 2020-07-14 RX ORDER — LIDOCAINE HYDROCHLORIDE 10 MG/ML
1 INJECTION, SOLUTION EPIDURAL; INFILTRATION; INTRACAUDAL; PERINEURAL
Status: DISCONTINUED | OUTPATIENT
Start: 2020-07-14 | End: 2020-07-14 | Stop reason: HOSPADM

## 2020-07-14 RX ORDER — PROPOFOL 10 MG/ML
INJECTION, EMULSION INTRAVENOUS PRN
Status: DISCONTINUED | OUTPATIENT
Start: 2020-07-14 | End: 2020-07-14 | Stop reason: SDUPTHER

## 2020-07-14 RX ORDER — DEXAMETHASONE SODIUM PHOSPHATE 10 MG/ML
10 INJECTION, SOLUTION INTRAMUSCULAR; INTRAVENOUS ONCE
Status: COMPLETED | OUTPATIENT
Start: 2020-07-14 | End: 2020-07-14

## 2020-07-14 RX ORDER — ACETAMINOPHEN 325 MG/1
650 TABLET ORAL EVERY 6 HOURS
Status: DISCONTINUED | OUTPATIENT
Start: 2020-07-14 | End: 2020-07-14 | Stop reason: HOSPADM

## 2020-07-14 RX ORDER — SODIUM CHLORIDE 0.9 % (FLUSH) 0.9 %
10 SYRINGE (ML) INJECTION PRN
Status: DISCONTINUED | OUTPATIENT
Start: 2020-07-14 | End: 2020-07-14 | Stop reason: HOSPADM

## 2020-07-14 RX ORDER — DEXAMETHASONE SODIUM PHOSPHATE 4 MG/ML
INJECTION, SOLUTION INTRA-ARTICULAR; INTRALESIONAL; INTRAMUSCULAR; INTRAVENOUS; SOFT TISSUE PRN
Status: DISCONTINUED | OUTPATIENT
Start: 2020-07-14 | End: 2020-07-14 | Stop reason: SDUPTHER

## 2020-07-14 RX ORDER — CALCIUM CHLORIDE 100 MG/ML
INJECTION INTRAVENOUS; INTRAVENTRICULAR PRN
Status: DISCONTINUED | OUTPATIENT
Start: 2020-07-14 | End: 2020-07-14 | Stop reason: ALTCHOICE

## 2020-07-14 RX ORDER — ASPIRIN 81 MG/1
81 TABLET ORAL 2 TIMES DAILY
Status: DISCONTINUED | OUTPATIENT
Start: 2020-07-14 | End: 2020-07-14 | Stop reason: HOSPADM

## 2020-07-14 RX ORDER — MIDAZOLAM HYDROCHLORIDE 1 MG/ML
INJECTION INTRAMUSCULAR; INTRAVENOUS PRN
Status: DISCONTINUED | OUTPATIENT
Start: 2020-07-14 | End: 2020-07-14 | Stop reason: SDUPTHER

## 2020-07-14 RX ORDER — OXYCODONE HYDROCHLORIDE 5 MG/1
5 TABLET ORAL EVERY 4 HOURS PRN
Status: DISCONTINUED | OUTPATIENT
Start: 2020-07-14 | End: 2020-07-14 | Stop reason: HOSPADM

## 2020-07-14 RX ORDER — SENNA AND DOCUSATE SODIUM 50; 8.6 MG/1; MG/1
1 TABLET, FILM COATED ORAL 2 TIMES DAILY
Status: DISCONTINUED | OUTPATIENT
Start: 2020-07-14 | End: 2020-07-14 | Stop reason: HOSPADM

## 2020-07-14 RX ORDER — SODIUM CHLORIDE, SODIUM LACTATE, POTASSIUM CHLORIDE, CALCIUM CHLORIDE 600; 310; 30; 20 MG/100ML; MG/100ML; MG/100ML; MG/100ML
INJECTION, SOLUTION INTRAVENOUS CONTINUOUS
Status: DISCONTINUED | OUTPATIENT
Start: 2020-07-14 | End: 2020-07-14 | Stop reason: HOSPADM

## 2020-07-14 RX ORDER — TRANEXAMIC ACID 100 MG/ML
INJECTION, SOLUTION INTRAVENOUS PRN
Status: DISCONTINUED | OUTPATIENT
Start: 2020-07-14 | End: 2020-07-14 | Stop reason: SDUPTHER

## 2020-07-14 RX ORDER — OXYCODONE HYDROCHLORIDE 10 MG/1
10 TABLET ORAL EVERY 4 HOURS PRN
Status: DISCONTINUED | OUTPATIENT
Start: 2020-07-14 | End: 2020-07-14 | Stop reason: HOSPADM

## 2020-07-14 RX ADMIN — ACETAMINOPHEN 650 MG: 325 TABLET, FILM COATED ORAL at 16:23

## 2020-07-14 RX ADMIN — FENTANYL CITRATE 25 MCG: 50 INJECTION, SOLUTION INTRAMUSCULAR; INTRAVENOUS at 11:11

## 2020-07-14 RX ADMIN — Medication 20 MG: at 10:56

## 2020-07-14 RX ADMIN — SENNOSIDES AND DOCUSATE SODIUM 1 TABLET: 8.6; 5 TABLET ORAL at 16:23

## 2020-07-14 RX ADMIN — LIDOCAINE HYDROCHLORIDE 50 MG: 10 INJECTION, SOLUTION EPIDURAL; INFILTRATION; INTRACAUDAL; PERINEURAL at 10:43

## 2020-07-14 RX ADMIN — VANCOMYCIN HYDROCHLORIDE 1000 MG: 1 INJECTION, POWDER, LYOPHILIZED, FOR SOLUTION INTRAVENOUS at 09:25

## 2020-07-14 RX ADMIN — FENTANYL CITRATE 25 MCG: 50 INJECTION, SOLUTION INTRAMUSCULAR; INTRAVENOUS at 11:15

## 2020-07-14 RX ADMIN — GABAPENTIN 300 MG: 600 TABLET, FILM COATED ORAL at 09:18

## 2020-07-14 RX ADMIN — Medication 80 MG: at 10:43

## 2020-07-14 RX ADMIN — Medication 20 MG: at 10:54

## 2020-07-14 RX ADMIN — DEXAMETHASONE SODIUM PHOSPHATE 10 MG: 10 INJECTION, SOLUTION INTRAMUSCULAR; INTRAVENOUS at 08:56

## 2020-07-14 RX ADMIN — DEXAMETHASONE SODIUM PHOSPHATE 4 MG: 4 INJECTION, SOLUTION INTRA-ARTICULAR; INTRALESIONAL; INTRAMUSCULAR; INTRAVENOUS; SOFT TISSUE at 10:49

## 2020-07-14 RX ADMIN — SODIUM CHLORIDE 125 ML/HR: 9 INJECTION, SOLUTION INTRAVENOUS at 08:56

## 2020-07-14 RX ADMIN — BUPIVACAINE HYDROCHLORIDE 20 ML: 5 INJECTION, SOLUTION EPIDURAL; INTRACAUDAL at 13:19

## 2020-07-14 RX ADMIN — ACETAMINOPHEN 1000 MG: 500 TABLET, FILM COATED ORAL at 08:56

## 2020-07-14 RX ADMIN — PROPOFOL 90 MG: 10 INJECTION, EMULSION INTRAVENOUS at 10:43

## 2020-07-14 RX ADMIN — BUPIVACAINE 10 ML: 13.3 INJECTION, SUSPENSION, LIPOSOMAL INFILTRATION at 13:19

## 2020-07-14 RX ADMIN — ONDANSETRON 4 MG: 2 INJECTION INTRAMUSCULAR; INTRAVENOUS at 10:49

## 2020-07-14 RX ADMIN — FENTANYL CITRATE 25 MCG: 50 INJECTION, SOLUTION INTRAMUSCULAR; INTRAVENOUS at 10:43

## 2020-07-14 RX ADMIN — MIDAZOLAM 2 MG: 1 INJECTION INTRAMUSCULAR; INTRAVENOUS at 10:39

## 2020-07-14 RX ADMIN — FENTANYL CITRATE 25 MCG: 50 INJECTION, SOLUTION INTRAMUSCULAR; INTRAVENOUS at 11:14

## 2020-07-14 RX ADMIN — TRANEXAMIC ACID 1000 MG: 100 INJECTION, SOLUTION INTRAVENOUS at 11:02

## 2020-07-14 ASSESSMENT — PAIN SCALES - GENERAL
PAINLEVEL_OUTOF10: 2
PAINLEVEL_OUTOF10: 2
PAINLEVEL_OUTOF10: 0
PAINLEVEL_OUTOF10: 1
PAINLEVEL_OUTOF10: 0
PAINLEVEL_OUTOF10: 2
PAINLEVEL_OUTOF10: 1

## 2020-07-14 ASSESSMENT — PULMONARY FUNCTION TESTS
PIF_VALUE: 16
PIF_VALUE: 9
PIF_VALUE: 17
PIF_VALUE: 16
PIF_VALUE: 26
PIF_VALUE: 16
PIF_VALUE: 17
PIF_VALUE: 17
PIF_VALUE: 16
PIF_VALUE: 20
PIF_VALUE: 16
PIF_VALUE: 17
PIF_VALUE: 3
PIF_VALUE: 17
PIF_VALUE: 17
PIF_VALUE: 24
PIF_VALUE: 16
PIF_VALUE: 24
PIF_VALUE: 19
PIF_VALUE: 20
PIF_VALUE: 20
PIF_VALUE: 1
PIF_VALUE: 30
PIF_VALUE: 19
PIF_VALUE: 17
PIF_VALUE: 17
PIF_VALUE: 16
PIF_VALUE: 16
PIF_VALUE: 20
PIF_VALUE: 16
PIF_VALUE: 23
PIF_VALUE: 19
PIF_VALUE: 16
PIF_VALUE: 2
PIF_VALUE: 1
PIF_VALUE: 16
PIF_VALUE: 16
PIF_VALUE: 18
PIF_VALUE: 24
PIF_VALUE: 16
PIF_VALUE: 24
PIF_VALUE: 18
PIF_VALUE: 17
PIF_VALUE: 17
PIF_VALUE: 1
PIF_VALUE: 16
PIF_VALUE: 17
PIF_VALUE: 24
PIF_VALUE: 16
PIF_VALUE: 28
PIF_VALUE: 17
PIF_VALUE: 16
PIF_VALUE: 18
PIF_VALUE: 16
PIF_VALUE: 16
PIF_VALUE: 17
PIF_VALUE: 16
PIF_VALUE: 16
PIF_VALUE: 2
PIF_VALUE: 18
PIF_VALUE: 16
PIF_VALUE: 1
PIF_VALUE: 25
PIF_VALUE: 16
PIF_VALUE: 16
PIF_VALUE: 17
PIF_VALUE: 24
PIF_VALUE: 17
PIF_VALUE: 24
PIF_VALUE: 6
PIF_VALUE: 16

## 2020-07-14 ASSESSMENT — PAIN DESCRIPTION - PAIN TYPE
TYPE: SURGICAL PAIN

## 2020-07-14 ASSESSMENT — PAIN DESCRIPTION - PROGRESSION
CLINICAL_PROGRESSION: GRADUALLY WORSENING
CLINICAL_PROGRESSION: GRADUALLY WORSENING

## 2020-07-14 ASSESSMENT — PAIN - FUNCTIONAL ASSESSMENT
PAIN_FUNCTIONAL_ASSESSMENT: PREVENTS OR INTERFERES SOME ACTIVE ACTIVITIES AND ADLS
PAIN_FUNCTIONAL_ASSESSMENT: PREVENTS OR INTERFERES WITH ALL ACTIVE AND SOME PASSIVE ACTIVITIES
PAIN_FUNCTIONAL_ASSESSMENT: 0-10

## 2020-07-14 ASSESSMENT — PAIN DESCRIPTION - LOCATION
LOCATION: KNEE
LOCATION: LEG;KNEE
LOCATION: LEG;KNEE
LOCATION: KNEE;LEG

## 2020-07-14 ASSESSMENT — PAIN DESCRIPTION - ORIENTATION
ORIENTATION: LEFT

## 2020-07-14 ASSESSMENT — ENCOUNTER SYMPTOMS
STRIDOR: 0
SHORTNESS OF BREATH: 0

## 2020-07-14 ASSESSMENT — PAIN DESCRIPTION - FREQUENCY
FREQUENCY: CONTINUOUS
FREQUENCY: CONTINUOUS

## 2020-07-14 ASSESSMENT — PAIN DESCRIPTION - DESCRIPTORS: DESCRIPTORS: DULL

## 2020-07-14 ASSESSMENT — PAIN DESCRIPTION - ONSET: ONSET: ON-GOING

## 2020-07-14 ASSESSMENT — LIFESTYLE VARIABLES: SMOKING_STATUS: 0

## 2020-07-14 NOTE — PROGRESS NOTES
Discharge instructions reviewed with the patient and her spouse. All questions answered. Pt discharged with personal belongings including dentures, glasses, and cell phone.

## 2020-07-14 NOTE — OP NOTE
A routine medial parapatellar arthrotomy was then carried out in the routine fashion. Synovectomy was carried out. Care was taken to avoid damage to the patellar tendon insertion of the tubercle. After clearance of the medial lateral gutters as well as the peripatellar area were careful to flex the knee. The no obvious metallosis was noted. The polyethylene locking clip was removed and the polyethylene removed. Severe wear of the medial aspect was noted. There were some pieces of plastic/polyethylene floating in the posterior area which were removed. This area was copiously dissected for any further soft tissue area and/or polyethylene debris. The femoral and tibial components were checked carefully for evidence for loosening. There was no evidence based on pressure to these areas as well as irrigation technique. Satisfied with this a tendon a 12 polyethylene was inserted. The trial was found to be excellent with a 12 with good stability in both flexion extension and varus valgus stressing. This area was copiously irrigated again and the size 12/71 x 75 polyethylene was inserted and locked in place. The stability and motion were found to be excellent. The area was copious irrigated again with Irrisept and then sprayed with platelet rich platelet poor plasma spray. The arthrotomy was closed with a #1 strata fix starting proximally and distally and working towards the center and reinforced. Subcu was closed with 2-0 Monocryl strata fix and zip line for the skin covered with Aquasol dressing and Ace bandage.   The patient was awakened taken postanesthesia care unit in good condition    Electronically signed by Toma Garcia MD on 7/14/2020 at 11:35 AM

## 2020-07-14 NOTE — ANESTHESIA PRE PROCEDURE
Department of Anesthesiology  Preprocedure Note       Name:  Elysia Faulkner   Age:  67 y.o.  :  1940                                          MRN:  [id number]         Date:  2020      Surgeon: Palak Mcgraw):  Shreyas Marr MD    Procedure: Procedure(s):  KNEE TOTAL ARTHROPLASTY REVISION - POLY EXCHANGE VS FULL REVISION    Medications prior to admission:   Prior to Admission medications    Medication Sig Start Date End Date Taking? Authorizing Provider   irbesartan (AVAPRO) 75 MG tablet Take 1 tablet by mouth daily 20   Leni Turcios MD   nystatin-triamcinolone [facility]) 220069-1.6 UNIT/GM-% cream Apply topically 4 times daily Apply topically 4 times daily.  20   Leni Turcios MD   rOPINIRole (REQUIP) 1 MG tablet Take by mouth nightly  3/24/20   Historical Provider, MD   glipiZIDE (GLUCOTROL) 5 MG tablet 2.5mg in the am and 2.5 mg in the pm 20   Historical Provider, MD   Continuous Blood Gluc Sensor (FREESTYLE DAVIS 14 DAY SENSOR) [facility]  20   Historical Provider, MD   Salicylic Acid (COMPOUND W EX)  19   Historical Provider, MD   LIVALO 2 MG TABS tablet Take 2 mg by mouth nightly  19   Historical Provider, MD   Multiple Vitamins-Minerals (THERAPEUTIC MULTIVITAMIN-MINERALS) tablet Take 1 tablet by mouth daily    Historical Provider, MD   Multiple Vitamins-Minerals (PRESERVISION AREDS PO) Take by mouth daily     Historical Provider, MD   Continuous Blood Gluc  (FREESTYLE DAVIS 14 DAY READER) MATTHEW  19   Historical Provider, MD   donepezil (ARICEPT) 10 MG tablet Take 10 mg by mouth nightly  19   Historical Provider, MD   trospium (SANCTURA) 60 MG CP24 extended release capsule TAKE 1 CAPSULE BY MOUTH ONE TIME A DAY 19   Juancho Hernandez MD   estradiol (ESTRACE VAGINAL) 0.1 MG/GM vaginal cream Place 1 g vaginally Twice a Week 19   Juancho Hernandez MD   dexlansoprazole (DEXILANT) 60 MG CPDR delayed release capsule Take 60 mg by mouth daily 2 hours before bed    Historical Provider, MD   vitamin D (ERGOCALCIFEROL) 66578 units CAPS capsule TAKE 1 CAPSULE BY MOUTH ONE TIME A WEEK  10/17/18   Nancy Sarabia MD   FARXIGA 10 MG tablet TAKE 1 TABLET BY MOUTH IN THE MORNING  3/23/18   Nancy Sarabia MD   CRANBERRY PO Take by mouth 2 times daily    Historical Provider, MD       Current medications:    Current Facility-Administered Medications   Medication Dose Route Frequency Provider Last Rate Last Dose    vancomycin 1000 mg IVPB in 250 mL D5W addavial  1,000 mg Intravenous On Call to Alanna Robles MD        acetaminophen (TYLENOL) tablet 1,000 mg  1,000 mg Oral Once Ai Moy MD        dexamethasone (PF) (DECADRON) injection 10 mg  10 mg Intravenous Once Ai Moy MD        gabapentin (NEURONTIN) tablet 300 mg  300 mg Oral Once Ai Moy MD        scopolamine (TRANSDERM-SCOP) transdermal patch 1 patch  1 patch Transdermal Once Ai Moy MD        0.9 % sodium chloride infusion  125 mL/hr Intravenous Continuous Bud Thor Benton MD        lidocaine PF 1 % injection 1 mL  1 mL Intradermal Once PRN Jelani Downey MD        sodium chloride flush 0.9 % injection 10 mL  10 mL Intravenous 2 times per day Jelani Downey MD        sodium chloride flush 0.9 % injection 10 mL  10 mL Intravenous PRN Jelani Downey MD         Facility-Administered Medications Ordered in Other Encounters   Medication Dose Route Frequency Provider Last Rate Last Dose    0.9 % sodium chloride infusion 250 mL  250 mL Intravenous Once Nancy Sarabia MD           Allergies:     Allergies   Allergen Reactions    Iodides Anaphylaxis, Hives and Itching     \"Contrast dyes\"  This was added by someone but Patient states has had IVP dyes multiple times without problems and had a myelogram in Dec 2016 without problems    Povidone-Iodine Anaphylaxis, Hives and Swelling    Sulfa Antibiotics Hives and Swelling    Betadine [Povidone Iodine] Hives    Cephalexin Hives and Swelling    Cephalexin Itching     Other reaction(s): Hallucinations  In 1969 received demerol and keflex together so was told to list both as allergies    Cozaar [Losartan] Nausea Only     Pt also gets sores on toungue    Cymbalta [Duloxetine Hcl] Diarrhea and Nausea And Vomiting     Weakness    Demerol Hives and Swelling    Doxycycline Other (See Comments)     Sore mouth        Meperidine Itching     Other reaction(s): Hallucinations      Morphine Hives and Itching    Pcn [Penicillins] Hives, Swelling and Itching     Other reaction(s): Intolerance-unknown    Zithromax [Azithromycin] Other (See Comments)     Sore mouth      Etodolac     Fluorescein     Iodine      Other reaction(s):  Intolerance-unknown    Lisinopril      cough    Penicillin G     Soap     Toviaz [Fesoterodine Fumarate Er]     Metformin And Related Nausea And Vomiting     Diarrhea and N/V       Problem List:    Patient Active Problem List   Diagnosis Code    TIA (transient ischemic attack) G45.9    Chronic back pain M54.9, G89.29    Diabetic peripheral neuropathy (HCC) E11.42    Macular degeneration of left eye H35.30    Constipation K59.00    B12 deficiency E53.8    Vitamin D deficiency E55.9    Anemia D64.9    DDD (degenerative disc disease), cervical M50.30    Age-related cognitive decline R41.81    Acquired cyst of kidney N28.1    Microscopic hematuria R31.29    Bilateral renal cysts N28.1    Essential hypertension I10    Gastroesophageal reflux disease without esophagitis K21.9    Mixed incontinence N39.46    Recurrent UTI N39.0    Pure hypercholesterolemia E78.00    Hiatal hernia K44.9    Diabetic gastroparesis (HCC) E11.43, K31.84    Internal hemorrhoid K64.8    Tubular adenoma D36.9    Colon polyps K63.5    Type 2 diabetes mellitus with complication, without long-term current use of insulin (HCC) E11.8    Urge incontinence N39.41    S/P lumbar fusion Z98.1    Chronic ITP (idiopathic thrombocytopenia) ([facility]) D69.3    Urinary retention R33.9    Unsteadiness R26.81    Anxiety F41.9    Arthritis M19.90    Nausea R11.0    Diarrhea R19.7    Acute bronchitis J20.9    Extrarenal pelvis Q63.8       Past Medical History:        Diagnosis Date    Age-related cognitive decline     Ankle pain     Left ankle fracture in ortho boat.    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    left with urinary incontinenc post surgical    Caffeine use     2 coffee/day, 1-2 tea per week    Chronic back pain     Chronic ITP (idiopathic thrombocytopenia) ([facility])     CVA (cerebral infarction) 2008, 2010    balance issues, short term memory loss    Diabetic gastroparesis ([address]     Diverticular disease 1986    GERD (gastroesophageal reflux disease)     Hiatal hernia     Hip fracture ([address]     History of blood transfusion     History of decreased platelet count     Hyperlipemia     Hypertension     Internal hemorrhoid     Macular degeneration of left eye 1980's    S/P laser treatment    Nausea and vomiting     Neuropathy     Osteoarthritis     Ringing in ears     Self-catheterizes urinary bladder     6-7 times daily    TIA (transient ischemic attack) 2000    x1    Tubular adenoma     colon polyps    Type II or unspecified type diabetes mellitus without mention of complication, not stated as uncontrolled     Urinary incontinence     frequent UTI s/p infection, surgical dilatation lead to incontinence    Wears dentures     full on top, partial on bottom    Wears glasses        Past Surgical History:        Procedure Laterality Date    APPENDECTOMY  1962    BLADDER SURGERY      bladder stimulator    BLADDER SUSPENSION  2002    multiple    CARDIAC CATHETERIZATION  2008    no stents    CARDIOVASCULAR STRESS TEST  12/2014    CATARACT REMOVAL WITH IMPLANT Left 11/07/2017    [facility]    [facility]    COLON SURGERY      colostomy reversal    COLONOSCOPY  16    tubular adenoma x3; internal hemorrhoids    COLONOSCOPY N/A 2019    COLONOSCOPY DIAGNOSTIC performed by Lon Falk MD at [address] [place] [address]    bowel obstruction/ rupture from diverticular disease, reversal 3 mos later    CYSTOSCOPY  2017    W/ 200IU Botox     FRACTURE SURGERY Right 2011    hip    FRACTURE SURGERY Left     WRIST    FRACTURE SURGERY Left     ankle    HERNIA REPAIR      HYSTERECTOMY  1969    JOINT REPLACEMENT Bilateral 2000    BILAT KNEES    LUMBAR FUSION  2017    L2/L3    WI XCAPSL CTRC RMVL INSJ IO LENS PROSTH W/O ECP Left 2017    EYE CATARACT EMULSIFICATION IOL IMPLANT performed by Darius Baker MD at [address] [place] [address] W/O ECP Right 2017    EYE CATARACT EMULSIFICATION IOL IMPLANT performed by Darius Baker MD at [facility] Right 10/27/15    WITH POLY EXCHANGE BIOMET AND GPS APPLICATION    [address] [facility] SURGERY      fusion, did not take   [address]    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    NEAL AND BSO      TONSILLECTOMY      UPPER GASTROINTESTINAL ENDOSCOPY      UPPER GASTROINTESTINAL ENDOSCOPY  14    UPPER GASTROINTESTINAL ENDOSCOPY  2016    mild gastritis    UPPER GASTROINTESTINAL ENDOSCOPY N/A 2018    retained thick secretions in proximal esophagus       Social History:    Social History     Tobacco Use    Smoking status: Former Smoker     Packs/day: 0.50     Years: 20.00     Pack years: 10.00     Last attempt to quit: 1982     Years since quittin.1    Smokeless tobacco: Former User     Quit date: 1982   Substance Use Topics    Alcohol use: No                                Counseling given: Not Answered      Vital Signs (Current): There were no vitals filed for this visit. BP Readings from Last 3 Encounters:   06/30/20 (!) 163/87   07/02/20 124/78   05/04/20 (!) 142/80       NPO Status: Time of last liquid consumption: 2000                        Time of last solid consumption: 1830                        Date of last liquid consumption: 07/13/20                        Date of last solid food consumption: 07/13/20    BMI:   Wt Readings from Last 3 Encounters:   06/30/20 156 lb (70.8 kg)   07/02/20 163 lb 9.6 oz (74.2 kg)   06/26/20 156 lb (70.8 kg)     There is no height or weight on file to calculate BMI.    CBC:   Lab Results   Component Value Date    WBC 5.1 06/30/2020    RBC 4.82 06/30/2020    RBC 4.37 06/02/2016    HGB 13.9 06/30/2020    HCT 42.7 06/30/2020    MCV 88.8 06/30/2020    RDW 16.0 06/30/2020    PLT 95 06/30/2020     LR    CMP:   Lab Results   Component Value Date     06/30/2020    K 4.4 06/30/2020     06/30/2020    CO2 22 06/30/2020    BUN 20 06/30/2020    CREATININE 0.75 06/30/2020    GFRAA >60 06/30/2020    LABGLOM >60 06/30/2020    GLUCOSE 144 06/30/2020    GLUCOSE 140 06/02/2016    PROT 6.8 08/09/2019    CALCIUM 9.3 06/30/2020    BILITOT 0.34 08/09/2019    ALKPHOS 62 08/09/2019    AST 18 08/09/2019    ALT 14 08/09/2019       POC Tests: No results for input(s): POCGLU, POCNA, POCK, POCCL, POCBUN, POCHEMO, POCHCT in the last 72 hours.     Coags:   Lab Results   Component Value Date    PROTIME 10.8 02/21/2017    INR 1.0 02/21/2017    APTT 26.4 02/21/2017       HCG (If Applicable): No results found for: PREGTESTUR, PREGSERUM, HCG, HCGQUANT     ABGs: No results found for: PHART, PO2ART, NKA0CIO, BTK3JSL, BEART, D7YAUMPH     Type & Screen (If Applicable):  No results found for: LABABO, LABRH    Drug/Infectious Status (If Applicable):  No results found for: HIV, HEPCAB    COVID-19 Screening (If Applicable):   Lab Results   Component Value Date    COVID19 Not Detected 07/10/2020         Anesthesia Evaluation  Patient summary reviewed no history of anesthetic complications:   Airway: Mallampati: III  TM distance: >3 FB     Mouth opening: > = 3 FB Dental:    (+) upper dentures and partials      Pulmonary:Negative Pulmonary ROS and normal exam  breath sounds clear to auscultation      (-) pneumonia, COPD, asthma, shortness of breath, recent URI, sleep apnea, rhonchi, wheezes, rales, stridor and not a current smoker                           Cardiovascular:  Exercise tolerance: good (>4 METS),   (+) hypertension: no interval change,     (-) pacemaker, valvular problems/murmurs, past MI, CAD, CABG/stent, dysrhythmias,  angina,  CHF, orthopnea, PND,  GARCIA, murmur, weak pulses,  friction rub, systolic click, carotid bruit,  JVD and peripheral edema    ECG reviewed  Rhythm: regular  Rate: normal           Beta Blocker:  Not on Beta Blocker         Neuro/Psych:   (+) CVA:, neuromuscular disease:, TIA,    (-) seizures, headaches, psychiatric history and depression/anxiety            GI/Hepatic/Renal:   (+) hiatal hernia, GERD: no interval change,      (-) PUD, hepatitis, liver disease, no renal disease, bowel prep and no morbid obesity       Endo/Other:    (+) DiabetesType II DM, , no malignancy/cancer. (-) hypothyroidism, hyperthyroidism, blood dyscrasia, arthritis, no electrolyte abnormalities, no malignancy/cancer               Abdominal:           Vascular: negative vascular ROS. - PVD, DVT and PE. Anesthesia Plan      general and regional     ASA 3       Induction: intravenous. MIPS: Postoperative opioids intended and Prophylactic antiemetics administered. Anesthetic plan and risks discussed with patient. Plan discussed with CRNA. The patient was counseled at length about the risks of jacques Covid-19 during their perioperative period and any recovery window from their procedure.   The patient was made aware that jacques Covid-19  may worsen their prognosis for recovering from

## 2020-07-14 NOTE — DISCHARGE INSTR - DIET

## 2020-07-14 NOTE — PROGRESS NOTES
Physical Therapy    Facility/Department: [facility] MED SURG  Initial Assessment    NAME: Marion Soto  : 1940  MRN: [id number]    Date of Service: 2020    Discharge Recommendations: The patient would benefit from additional Physical  Therapy after discharge from the facility upon return to their Home. PT Equipment Recommendations  Equipment Needed: No  Other: Pt has RW    Assessment   Body structures, Functions, Activity limitations: Decreased ADL status; Decreased functional mobility ; Decreased ROM; Decreased strength;Decreased endurance;Decreased balance; Increased pain  Assessment: Pt requiring 1 assist for mobility, only one episode of knee buckle during initial stepping. Pt would benefit from continued PT to maximize her functional potential as pt is very independent. Treatment Diagnosis: Impaired functional mobility 2* primary OA L knee  Specific instructions for Next Treatment: progress stairs, HEP, gait distance  Prognosis: Good  Decision Making: Medium Complexity  History: L TKA on 20 By Dr Pao Guerra  Exam: ROM, MMT, bed mobility, transfers, amb, activity tolerance, balance  Clinical Presentation: Pt alert, pleasant and motivated for PT  Barriers to Learning: none  REQUIRES PT FOLLOW UP: Yes  Activity Tolerance  Activity Tolerance: Patient limited by pain; Patient Tolerated treatment well       Patient Diagnosis(es): The encounter diagnosis was Primary osteoarthritis of left knee.      has a past medical history of Age-related cognitive decline, Ankle pain, Anxiety, B12 deficiency, Winters esophagus, 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HCC), Caffeine use, Chronic back pain, Chronic ITP (idiopathic thrombocytopenia) (HCC), CVA (cerebral infarction), Diabetic gastroparesis ([address] Diverticular disease, GERD (gastroesophageal reflux disease), Hiatal hernia, Hip fracture ([address] History of blood transfusion, History of decreased platelet count, Hyperlipemia, Hypertension, Internal hemorrhoid, Macular degeneration of left eye, Nausea and vomiting, Neuropathy, Osteoarthritis, Ringing in ears, Self-catheterizes urinary bladder, TIA (transient ischemic attack), Tubular adenoma, Type II or unspecified type diabetes mellitus without mention of complication, not stated as uncontrolled, Urinary incontinence, Wears dentures, and Wears glasses. has a past surgical history that includes bladder suspension (2002); Hysterectomy (1969); Tonsillectomy (1978); Appendectomy (1962); Spine surgery (2010); colostomy (1986); Revision Colostomy (1986); Spine surgery (1990); Upper gastrointestinal endoscopy; Bladder surgery; Upper gastrointestinal endoscopy (05-05-14); cardiovascular stress test (12/2014); Colon surgery; eunice and bso (cervix removed); fracture surgery (Right, 2011); fracture surgery (Left, 2001); fracture surgery (Left, 2013); Cardiac catheterization (2008); Revision total knee arthroplasty (Right, 10/27/15); Colonoscopy (4/7/16); Upper gastrointestinal endoscopy (04/07/2016); lumbar fusion (2017); Cystocopy (09/08/2017); Cataract removal with implant (Left, 11/07/2017); pr xcapsl ctrc rmvl insj io lens prosth w/o ecp (Left, 11/7/2017); Cataract removal with implant (Right, 11/28/2017); pr xcapsl ctrc rmvl insj io lens prosth w/o ecp (Right, 11/28/2017); Upper gastrointestinal endoscopy (N/A, 7/17/2018); joint replacement (Bilateral, 2000); hernia repair; Colonoscopy (N/A, 11/6/2019); and Revision total knee arthroplasty (Left, 7/14/2020). Restrictions  Restrictions/Precautions  Restrictions/Precautions: Weight Bearing, General Precautions, Fall Risk  Required Braces or Orthoses?: Yes  Implants present? : Metal implants(B TKA many years ago.  R femur rody 2011)  Lower Extremity Weight Bearing Restrictions  Left Lower Extremity Weight Bearing: Weight Bearing As Tolerated(Full WBAT)  Position Activity Restriction  Other position/activity restrictions: PT eval and treat; may shower  Vision/Hearing  Vision: Impaired  Vision Exceptions: Wears glasses at all times  Hearing: Within functional limits     Subjective  General  Chart Reviewed: Yes  Patient assessed for rehabilitation services?: Yes  Additional Pertinent Hx: self cath/lala, JAMEY TKA 2000, back sx  Family / Caregiver Present: Yes( and friend)  Referring Practitioner: Dr Niel Felty  Referral Date : 07/14/20  Diagnosis: Primary OA L knee (s/p L TKA revision)  Follows Commands: Within Functional Limits  Subjective  Subjective: Pt in bed, agreeable and pleasant for PT. Friend and  at bedside. RN Nora Hu. Pain Screening  Patient Currently in Pain: Yes(grimaces in pain with movement)  Pain Assessment  Pain Assessment: 0-10  Pain Level: 1  Pain Type: Surgical pain  Pain Location: Leg;Knee  Pain Orientation: Left  Pain Frequency: Continuous  Pain Onset: On-going  Clinical Progression: Gradually worsening  Functional Pain Assessment: Prevents or interferes with all active and some passive activities  Non-Pharmaceutical Pain Intervention(s): Ambulation/Increased Activity;Cold applied;Distraction;Repositioned; Rest  Response to Pain Intervention: Patient Satisfied  Vital Signs  Patient Currently in Pain: Yes(grimaces in pain with movement)  Oxygen Therapy  SpO2: 94 %  O2 Device: None (Room air)  Patient Observation  Observations: occasional stuttering and word finding (hx of CVA), on room air       Orientation  Orientation  Overall Orientation Status: Within Normal Limits  Social/Functional History  Social/Functional History  Lives With: Spouse  Type of Home: Mobile home  Home Layout: One level  Home Access: Stairs to enter with rails  Entrance Stairs - Number of Steps: 4 steps to enter  Entrance Stairs - Rails: Both(only able to reach 1 at a time)  Bathroom Shower/Tub: Tub/Shower unit, Shower chair without back, Curtain  Bathroom Toilet: Standard  Bathroom Equipment: Grab bars in shower, Shower chair, 3-in-1 commode, Hand-held shower  Bathroom Accessibility: Walker accessible  Home Equipment: Rolling walker, 4 wheeled walker, Reacher  Receives Help From: Family  ADL Assistance: Independent  Homemaking Assistance: Independent  Homemaking Responsibilities: Yes  Ambulation Assistance: Independent(primarily uses cane, 4 wheeled walker for days of increased pain.)  Transfer Assistance: Independent  Active : Yes  Mode of Transportation: Car  Occupation: Retired  Type of occupation: pt worked as a  at [address] [place] [address]: Pt has adjustable bed  Additional Comments:  available 24/7 to provide assist at d/c. Pt [place] and legally blind however  Cognition        Objective          AROM RLE (degrees)  RLE AROM: WNL  AROM LLE (degrees)  LLE AROM : WFL  LLE General AROM: Knee AROM: 4-104  AROM RUE (degrees)  RUE General AROM: See OT  AROM LUE (degrees)  LUE General AROM: See OT  Strength RLE  Strength RLE: WNL  Comment: Grossly 4 to 4-/5  Strength LLE  Strength LLE: WFL  Comment: Grossly 4-/5  Strength RUE  Comment: See OT  Strength LUE  Comment: See OT     Sensation  Overall Sensation Status: Impaired(B feet numbness/tingling)  Bed mobility  Supine to Sit: Stand by assistance  Sit to Supine: Unable to assess  Scooting: Stand by assistance  Comment: HOB elevated - pt has adjustable bed. SBA for safety, minimal cues needed. Pt repositioned herself with feet on ground. SBA sitting balance. Transfers  Sit to Stand: Contact guard assistance  Stand to sit: Contact guard assistance  Bed to Chair: Contact guard assistance  Stand Pivot Transfers: Contact guard assistance  Comment: Cues for safe technique and hand placement with RW. Good carryover.   Ambulation  Ambulation?: Yes  WB Status: Full WBAT LLE  Ambulation 1  Surface: level tile  Device: Rolling Walker  Assistance: Minimal assistance;Contact guard assistance  Quality of Gait: slow jia, 1 knee buckle initially within first couple of steps, no LOB  Gait Deviations: Slow Jia  Distance: 35', 15'  Comments: Lateral weight shifting performed x4 prior to attempting steps. Pt up in chair at end of session with ice pack. Instructed pt, pt's , and friend on how to use ice pack. Stairs/Curb  Stairs?: No     Balance  Posture: Good  Sitting - Static: Good  Sitting - Dynamic: Good;-  Standing - Static: Fair;+  Standing - Dynamic: Fair  Comments: Fall risk, standing balance assessed with RW. Plan   Plan  Times per week: BID  Specific instructions for Next Treatment: progress stairs, HEP, gait distance  Current Treatment Recommendations: Strengthening, ROM, Balance Training, Functional Mobility Training, Transfer Training, Endurance Training, Gait Training, Stair training, Equipment Evaluation, Education, & procurement, Home Exercise Program, Safety Education & Training, Patient/Caregiver Education & Training, Positioning  Safety Devices  Type of devices: All fall risk precautions in place, Call light within reach, Gait belt, Patient at risk for falls, Nurse notified, Left in chair(RN Saira Albarran, pt OK up to use restroom with nursing)  Restraints  Initially in place: No    G-Code       OutComes Score                                                  AM-PAC Score  AM-PAC Inpatient Mobility Raw Score : 16 (07/14/20 1422)  AM-PAC Inpatient T-Scale Score : 40.78 (07/14/20 1422)  Mobility Inpatient CMS 0-100% Score: 54.16 (07/14/20 1422)  Mobility Inpatient CMS G-Code Modifier : CK (07/14/20 1422)          Goals  Short term goals  Time Frame for Short term goals: 1-2 treatments  Short term goal 1: Pt to demonstrate Mod I bed mobility. Short term goal 2: Pt to demonstrate Mod I transfers. Short term goal 3: Pt to amb 100' Mod I. Short term goal 4: Pt to ascend/descend 4 stairs per home set up, SBA/CGA. Short term goal 5: Pt to demonstrate good technique for HEP. Short term goal 6: Pt to improve AROM of L knee 0-108 to improve ease of mobility. Patient Goals   Patient goals :  To go home       Therapy Time Individual Concurrent Group Co-treatment   Time In 1422         Time Out 1454         Minutes 32         Timed Code Treatment Minutes: 12 Minutes       Eileen Dolan, PT

## 2020-07-14 NOTE — PROGRESS NOTES
Physical Therapy  Facility/Department: [facility] MED SURG  Daily Treatment Note  NAME: Danae Lopez  : 1940  MRN: [id number]    Date of Service: 2020    Discharge Recommendations: The patient would benefit from additional Physical  Therapy after discharge from the facility upon return to their Home. PT Equipment Recommendations  Equipment Needed: No  Other: Pt has RW, SBQC and SC    Assessment   Body structures, Functions, Activity limitations: Decreased ADL status; Decreased functional mobility ; Decreased ROM; Decreased strength;Decreased endurance;Decreased balance; Increased pain  Assessment: Pt requiring 1 assist for mobility, only one episode of knee buckle during initial stepping. Pt would benefit from continued PT to maximize her functional potential as pt is very independent. Treatment Diagnosis: Impaired functional mobility 2* primary OA L knee  Specific instructions for Next Treatment: progress stairs, HEP, gait distance  Prognosis: Good  Decision Making: Medium Complexity  History: L TKA on 20 By Dr Ha Barbour  Exam: ROM, MMT, bed mobility, transfers, amb, activity tolerance, balance  Clinical Presentation: Pt alert, pleasant and motivated for PT  Barriers to Learning: none  REQUIRES PT FOLLOW UP: Yes  Activity Tolerance  Activity Tolerance: Patient Tolerated treatment well     Patient Diagnosis(es): The encounter diagnosis was Primary osteoarthritis of left knee.      has a past medical history of Age-related cognitive decline, Ankle pain, Anxiety, B12 deficiency, Winters esophagus, 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HCC), Caffeine use, Chronic back pain, Chronic ITP (idiopathic thrombocytopenia) (HCC), CVA (cerebral infarction), Diabetic gastroparesis ([address] Diverticular disease, GERD (gastroesophageal reflux disease), Hiatal hernia, Hip fracture ([address] History of blood transfusion, History of decreased platelet count, Hyperlipemia, Hypertension, Internal hemorrhoid, Macular degeneration of left eye, chair upon entry, pt stated that she was ready to go home. General Comment  Comments: Pt waiting on  and friend to return, pt was very pleasant and cooperative. Pain Assessment  Pain Assessment: 0-10  Pain Level: 2(With increased movement)  Pain Type: Surgical pain  Pain Location: Knee;Leg  Pain Orientation: Left  Clinical Progression: Gradually worsening  Response to Pain Intervention: Patient Satisfied  Oxygen Therapy  SpO2: 94 %  O2 Device: None (Room air)  Patient Observation  Observations: occasional stuttering and word finding (hx of CVA), on room air       Orientation  Orientation  Overall Orientation Status: Within Normal Limits  Cognition      Objective      Transfers  Sit to Stand: Contact guard assistance  Stand to sit: Contact guard assistance  Bed to Chair: Contact guard assistance  Stand Pivot Transfers: Contact guard assistance  Comment: Cues for safe technique and hand placement with RW. Good carryover. Ambulation  Ambulation?: Yes  WB Status: Full WBAT LLE  Ambulation 1  Surface: level tile  Device: Rolling Walker  Assistance: Contact guard assistance  Quality of Gait: slow jia, no LOB. Pt knee buckle x 2  Gait Deviations: Slow Jia  Distance: 90' x 2  Comments: v.c to lock her knee w/ each step. Pt likes to talk and required constant reminder to do so. Stairs/Curb  Stairs?: Yes  Stairs  # Steps : 5(x 2)  Stairs Height: 4\"(6\")  Rails: Left ascending;Bilateral(SBQC and RW)  Curbs: 6\"  Device: No Device;Small Base Quad Cane;Rolling walker(Pt I/S in both devices)  Assistance: Contact guard assistance  Comment: v.c for proper sequencing     Balance  Posture: Good  Sitting - Static: Good  Sitting - Dynamic: Good;-  Standing - Static: Fair;+  Standing - Dynamic: Fair  Comments: Fall risk, standing balance assessed with RW.   Other exercises  Other exercises?: Yes  Other exercises 1: skate x 20  Other exercises 2: TKR Protocol seated x 15, verbal reviewed HEP supine and standing, pt was able to perform ex w/o difficulty. Other exercises 3: Sit<>Stand x 3(v.c's for hand placement)         Other Activities: Other (see comment)  Comment: 86* flexion seated Left knee              G-Code     OutComes Score                                                     AM-PAC Score             Goals  Short term goals  Time Frame for Short term goals: 1-2 treatments  Short term goal 1: Pt to demonstrate Mod I bed mobility. Short term goal 2: Pt to demonstrate Mod I transfers. Short term goal 3: Pt to amb 100' Mod I. Short term goal 4: Pt to ascend/descend 4 stairs per home set up, SBA/CGA. Short term goal 5: Pt to demonstrate good technique for HEP. Short term goal 6: Pt to improve AROM of L knee 0-108 to improve ease of mobility. Patient Goals   Patient goals : To go home    Plan    Plan  Times per week: BID  Specific instructions for Next Treatment: progress stairs, HEP, gait distance  Current Treatment Recommendations: Strengthening, ROM, Balance Training, Functional Mobility Training, Transfer Training, Endurance Training, Gait Training, Stair training, Equipment Evaluation, Education, & procurement, Home Exercise Program, Safety Education & Training, Patient/Caregiver Education & Training, Positioning  Safety Devices  Type of devices:  All fall risk precautions in place, Call light within reach, Gait belt, Patient at risk for falls, Nurse notified, Left in chair(LETY Youngblood)  Restraints  Initially in place: No     Therapy Time   Individual Concurrent Group Co-treatment   Time In [address] [place] [address] Dr         Time Out 1625         Minutes [facility], PTA   Electronically signed by Vidal Alfaro PTA on 7/14/2020 at 6:47 PM

## 2020-07-14 NOTE — ANESTHESIA POSTPROCEDURE EVALUATION
POST- ANESTHESIA EVALUATION       Pt Name: Kevyn Weaver  MRN: [id number]  YOB: 1940  Date of evaluation: 7/14/2020  Time:  1:25 PM      BP (!) 123/59   Pulse 84   Temp 98.1 °F (36.7 °C)   Resp 14   Ht 4' 11\" (1.499 m)   Wt 160 lb (72.6 kg)   SpO2 95%   BMI 32.32 kg/m²      Consciousness Level  Awake  Cardiopulmonary Status  Stable  Pain Adequately Treated YES  Nausea / Vomiting  NO  Adequate Hydration  YES  Anesthesia Related Complications NONE      Electronically signed by Gustavo Fox MD on 7/14/2020 at 1:25 PM       Department of Anesthesiology  Postprocedure Note    Patient: Kevyn Weaver  MRN: [id number]  YOB: 1940  Date of evaluation: 7/14/2020  Time:  1:25 PM     Procedure Summary     Date:  07/14/20 Room / Location:  [address] [place] [address] / [facility]: [facility]    Anesthesia Start:  2091 Anesthesia Stop:  1582    Procedure:  KNEE TOTAL ARTHROPLASTY REVISION - POLY EXCHANGE (Left Knee) Diagnosis:  (PAINFUL TOTAL KNEE)    Surgeon:  Nati Reis MD Responsible Provider:  Gustavo Fox MD    Anesthesia Type:  general, regional ASA Status:  3          Anesthesia Type: general, regional    Lelo Phase I: Lelo Score: 9    Lelo Phase II:      Last vitals: Reviewed and per EMR flowsheets.        Anesthesia Post Evaluation

## 2020-07-14 NOTE — PROGRESS NOTES
Spoke to Dr. Patel Pringle regarding patient request for Reeves, patient has to self cath , wants temporary reeves, Dr. Ramirez Schreiber cath,

## 2020-07-14 NOTE — PROGRESS NOTES
[facility]: JUDD MERINO   Occupational Therapy Evaluation  Date: 20  Patient Name: Lin Daniel       Room: 8865/3904-31  MRN: [id number]  Account: [de-identified]   : 1940  (67 y.o.) Gender: female     Discharge Recommendations: The patient would benefit from additional Occupational Therapy after discharge from the facility upon return to their Home. OT Equipment Recommendations  Other: CTA    Referring Practitioner: Dr. Jordi Ambrosio  Diagnosis: s/p L total knee revision  - Dr. Edwina Heard  Additional Pertinent Hx: Pt is a 68 y/o female with PMHx of DM II, B TKA presents to [facility] with painful L TKA for revision    Treatment Diagnosis: impaired self- care and functional mobility     Assessment  Performance deficits / Impairments: Decreased functional mobility , Decreased endurance, Decreased ADL status, Decreased balance, Decreased strength, Decreased safe awareness  Assessment: Pt is a 68 y/o female s/p L total knee revision . Pt was previously independent with ADLs, functional mobility, and IADL tasks (with rest breaks) Pt also provided transportation to grocery store and medical appointments prior to admission. Pt now requires increased assist after surgery. Treatment Diagnosis: impaired self- care and functional mobility  Prognosis: Good  Decision Making: Medium Complexity  REQUIRES OT FOLLOW UP: Yes  Activity Tolerance: Patient limited by fatigue, Patient Tolerated treatment well, Patient limited by pain  Activity Tolerance: Pt SPO2 94 supine and 91 after functional mobility on room air.             Past Medical History:  has a past medical history of Age-related cognitive decline, Ankle pain, Anxiety, B12 deficiency, Winters esophagus, 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HCC), Caffeine use, Chronic back pain, Chronic ITP (idiopathic thrombocytopenia) (HCC), CVA (cerebral infarction), Diabetic gastroparesis ([address] Diverticular disease, GERD (gastroesophageal reflux disease), Hiatal hernia, Hip fracture ([address] History of blood transfusion, History of decreased platelet count, Hyperlipemia, Hypertension, Internal hemorrhoid, Macular degeneration of left eye, Nausea and vomiting, Neuropathy, Osteoarthritis, Ringing in ears, Self-catheterizes urinary bladder, TIA (transient ischemic attack), Tubular adenoma, Type II or unspecified type diabetes mellitus without mention of complication, not stated as uncontrolled, Urinary incontinence, Wears dentures, and Wears glasses. Past Surgical History:   has a past surgical history that includes bladder suspension (2002); Hysterectomy (1969); Tonsillectomy (1978); Appendectomy (1962); Spine surgery (2010); colostomy (1986); Revision Colostomy (1986); Spine surgery (1990); Upper gastrointestinal endoscopy; Bladder surgery; Upper gastrointestinal endoscopy (05-05-14); cardiovascular stress test (12/2014); Colon surgery; eunice and bso (cervix removed); fracture surgery (Right, 2011); fracture surgery (Left, 2001); fracture surgery (Left, 2013); Cardiac catheterization (2008); Revision total knee arthroplasty (Right, 10/27/15); Colonoscopy (4/7/16); Upper gastrointestinal endoscopy (04/07/2016); lumbar fusion (2017); Cystocopy (09/08/2017); Cataract removal with implant (Left, 11/07/2017); pr xcapsl ctrc rmvl insj io lens prosth w/o ecp (Left, 11/7/2017); Cataract removal with implant (Right, 11/28/2017); pr xcapsl ctrc rmvl insj io lens prosth w/o ecp (Right, 11/28/2017); Upper gastrointestinal endoscopy (N/A, 7/17/2018); joint replacement (Bilateral, 2000); hernia repair; Colonoscopy (N/A, 11/6/2019); and Revision total knee arthroplasty (Left, 7/14/2020). Restrictions  Restrictions/Precautions: Weight Bearing, General Precautions, Fall Risk  Implants present? : Metal implants(B TKA many years ago.  R femur rody 2011)  Other position/activity restrictions: OT eval and treat; may shower  Left Lower Extremity Weight Bearing: Weight Bearing As Tolerated(Full WBAT)  Required Braces or Orthoses?: Yes      Level of Consciousness: Alert    Subjective  Subjective: Pt denies pain and dizziness at rest.  Comments: RN roxy'fantasma pt for OT/PT evaluation. Pt supine in bed upon arrival with spouse and friend at bedside. Pt pleasant and cooperative throughout. Overall Orientation Status: Within Functional Limits     Vision  Vision: Impaired  Vision Exceptions: Wears glasses at all times  Hearing  Hearing: Within functional limits     Social/Functional History  Lives With: Spouse  Type of Home: Mobile home  Home Layout: One level  Home Access: Stairs to enter with rails  Entrance Stairs - Number of Steps: 4 steps to enter  Entrance Stairs - Rails: Both(only able to reach 1 at a time)  Bathroom Shower/Tub: Tub/Shower unit, Shower chair without back, Curtain  Bathroom Toilet: Standard  Bathroom Equipment: Grab bars in shower, Shower chair, 3-in-1 commode, Hand-held shower  Bathroom Accessibility: Walker accessible  Home Equipment: Rolling walker, 4 wheeled walker, Reacher  Receives Help From: Family  ADL Assistance: Independent  Homemaking Assistance: Independent  Homemaking Responsibilities: Yes  Ambulation Assistance: Independent(primarily uses cane, 4 wheeled walker for days of increased pain.)  Transfer Assistance: Independent  Active : Yes  Mode of Transportation: Car  Occupation: Retired  Type of occupation: pt worked as a  at [address] [place] [address]: Pt has adjustable bed  Additional Comments:  available 24/7 to provide assist at d/c.  Pt [place] and legally blind however       Pain Assessment  Pain Assessment: 0-10  Pain Level: 1  Pain Type: Surgical pain  Pain Location: Leg, Knee  Pain Orientation: Left  Pain Frequency: Continuous  Functional Pain Assessment: Prevents or interferes some active activities and ADLs  Non-Pharmaceutical Pain Intervention(s): Repositioned, Rest    Objective      Cognition  Overall Cognitive Status: WFL  Cognition Comment: moderate levels of verbal cueing for safety awareness     Sensation  Overall Sensation Status: Impaired(B feet numbness and tingling.)     ADL  Feeding: Independent  Grooming: Stand by assistance  UE Bathing: Stand by assistance  LE Bathing: Maximum assistance  UE Dressing: Stand by assistance  LE Dressing: Dependent/Total(writer donned socks while pt seated EOB. Limited by increased pain with forward flexion to reach feet)  Toileting: Moderate assistance  Additional Comments: ADLs assessed through clinical observation and reasoning. Pt educated on AE/DME recommendations, pt receptive to writer's recommendations. UE Function  LUE Strength  Gross LUE Strength: WFL  L Shoulder Flex: 4/5  L Shoulder ABduction: 4/5  L Elbow Flex: 4/5  L Elbow Ext: 4/5  L Wrist Flex: 4/5  L Wrist Ext: 4/5  L Hand General: 4/5     LUE Tone: Normotonic     LUE AROM (degrees)  LUE AROM : WFL     Left Hand AROM (degrees)  Left Hand AROM: WFL  RUE Strength  Gross RUE Strength: WFL  R Shoulder Flex: 4/5  R Shoulder ABduction: 4/5  R Elbow Flex: 4/5  R Elbow Ext: 4/5  R Wrist Flex: 4/5  R Wrist Ext: 4/5  R Hand General: 4/5      RUE Tone: Normotonic     RUE AROM (degrees)  RUE AROM : WFL     Right Hand AROM (degrees)  Right Hand AROM: WFL    Fine Motor Skills  Coordination  Movements Are Fluid And Coordinated: No  Coordination and Movement description: Gross motor impairments, Left UE, Right UE, Tremors, Intention tremors   Comment: tremors are at baseline due to prior stroke history      Quality of Movement Other  Comment: tremors are at baseline due to prior stroke history    Mobility  Supine to Sit: Stand by assistance       Balance  Sitting Balance: Supervision(intermittent BUE support on bed while seated without posterior support.  No LOB)  Standing Balance: Minimal assistance     Functional Mobility  Functional - Mobility Device: Rolling Walker  Activity: To/from bathroom, Other  Assist Level: Minimal assistance  Functional Mobility Comments: Pt with decreased risk for falls and good safety awareness  Short term goal 3: demo and verbalize understanding of recommended AE/DME independently, including use, precautions, and adjustments  Short term goal 4: demo and verbalize understanding of L TKA precautions during all participation in functional tasks independently  Short term goal 5: demo and verbalize understanding of fall prevention strategies independently to reduce fall risk in home environment    [address] in place: Yes  Type of devices:  All fall risk precautions in place, Left in chair, Nurse notified, Call light within reach, Gait belt, Patient at risk for falls  Restraints  Initially in place: No     Plan  Times per week: 5-6 x  Times per day: Twice a day  Current Treatment Recommendations: Strengthening, Patient/Caregiver Education & Training, Home Management Training, Equipment Evaluation, Education, & procurement, Balance Training, Neuromuscular Re-education, Functional Mobility Training, Endurance Training, Pain Management, Safety Education & Training, Self-Care / ADL  OT Education  OT Education: OT Role, Plan of Care, Precautions, Transfer Training, Energy Conservation, Equipment  Barriers to Learning: good return    OT Equipment Recommendations  Other: CTA  OT Individual Minutes  Time In: 1421  Time Out: 4546  Minutes: 33    Electronically signed by Vanessa Medel OT on 7/14/20 at 3:29 PM EDT

## 2020-07-14 NOTE — DISCHARGE INSTR - COC
K59.00    B12 deficiency E53.8    Vitamin D deficiency E55.9    Anemia D64.9    DDD (degenerative disc disease), cervical M50.30    Age-related cognitive decline R41.81    Acquired cyst of kidney N28.1    Microscopic hematuria R31.29    Bilateral renal cysts N28.1    Essential hypertension I10    Gastroesophageal reflux disease without esophagitis K21.9    Mixed incontinence N39.46    Recurrent UTI N39.0    Pure hypercholesterolemia E78.00    Hiatal hernia K44.9    Diabetic gastroparesis ([facility]) E11.43, K31.84    Internal hemorrhoid K64.8    Tubular adenoma D36.9    Colon polyps K63.5    Type 2 diabetes mellitus with complication, without long-term current use of insulin (Formerly [facility]) E11.8    Urge incontinence N39.41    S/P lumbar fusion Z98.1    Chronic ITP (idiopathic thrombocytopenia) ([facility]) D69.3    Urinary retention R33.9    Unsteadiness R26.81    Anxiety F41.9    Arthritis M19.90    Nausea R11.0    Diarrhea R19.7    Acute bronchitis J20.9    Extrarenal pelvis Q63.8    Primary osteoarthritis of left knee M17.12       Isolation/Infection:   Isolation          No Isolation        Patient Infection Status     Infection Onset Added Last Indicated Last Indicated By Review Planned Expiration Resolved Resolved By    None active    Resolved    COVID-19 Rule Out 07/10/20 07/10/20 07/10/20 COVID-19 (Ordered)   07/11/20 Rule-Out Test Resulted    COVID-19 Rule Out 05/08/20 05/08/20 05/08/20 COVID-19 Ambulatory (Ordered)   05/09/20 Rule-Out Test Resulted          Nurse Assessment:  Last Vital Signs: BP (!) 142/68   Pulse 59   Temp 98.2 °F (36.8 °C) (Oral)   Resp 16   Ht 4' 11\" (1.499 m)   Wt 160 lb (72.6 kg)   SpO2 96%   BMI 32.32 kg/m²     Last documented pain score (0-10 scale): Pain Level: 2  Last Weight:   Wt Readings from Last 1 Encounters:   07/14/20 160 lb (72.6 kg)     Mental Status:  oriented and alert    IV Access:  - None    Nursing Mobility/ADLs:  Walking   Independent  Transfer Independent  Bathing  Independent  Dressing  Independent  Toileting  Independent  Feeding  Independent  Med Admin  Independent  Med Delivery   whole         Safety Concerns: At Risk for Falls    Impairments/Disabilities:      None    Nutrition Therapy:  Current Nutrition Therapy:   - Oral Diet:  General    Routes of Feeding: Oral  Liquids: No Restrictions  Daily Fluid Restriction: no  Last Modified Barium Swallow with Video (Video Swallowing Test): not done    Treatments at the Time of Hospital Discharge:   Respiratory Treatments: n/a  Oxygen Therapy:  is not on home oxygen therapy. Ventilator:    - No ventilator support    Rehab Therapies: Physical Therapy and Occupational Therapy  Weight Bearing Status/Restrictions: No weight bearing restirctions  Other Medical Equipment (for information only, NOT a DME order):  walker  Other Treatments: skilled nursing assessment, medication education, may shower do not remove aquacel until seen by Dr Nikolai Skinner  This patient is a post-operative total joint replacement patient. Expectations for this patients care are as follows:    Initial RN Visit to establish care and verify patient meds plus daily PT for two weeks. Goals:   DailyTherapy for rehabilitative purposes for two weeks.  Increased level of activity and ambulation each day.  Well-controlled pain.  Free from infection.  Encourage patient to provide self-care when possible.  Ambulation with a rolling walker. Activity & Diet:   Therapy performed daily. (Instruct patient to take pain meds. 1 hour prior to therapy.)   Up in chair for all meals and majority of day.  Range of motion for TKA patients.  Hip precautions for ISHAN patients.    Incentive Spirometer: 3- 4 inhalations every twenty minutes, during the day, while awake, the first week home after discharge   Increase oral intake of fruits, fiber and water and take a daily stool softener to prevent constipation.  Consider stronger bowel protocol if no bowel movement is achieved after three days home.  Increase protein intake and reduce sugar intake to promote healing and prevent infection.  No pillow under the knee for TKA patients. [address] [employer] go on in the a.m. and come off in the p.m. (Hand wash them every two or three days, roll in towel to remove most of moisture, and hang to dry.)    Incision Care:   Keep incisional dressing intact until seen and removed by surgeon, unless saturated, in which case, call surgeon and request instructions.  If dressing falls off, call surgeon.  If using the CELSO dressing, with battery pack, then turn off and remove battery pack before showering and after showering re-install and turn battery pack on.   If using Aquacel dressing then dressing is waterproof and patient may shower.  Elevated the leg and ice the affected area four times a day, for twenty minutes each time and after every physical therapy session. Normal Conditions - Will improve with provided comfort measures and time:   Some swelling in the operative leg is normal - this should reduce over time.  Some post-operative pain is normal.  This will improve with prescribed medication, provided comfort measures and time.  Constipation related to pain medications & decreased mobility is a common occurrence. (Increase your fiber & water intake and take a stool softener.)    Slight warmth of operative site is normal and will diminish with time.  Fatigue and moderate pain after therapy is normal and will improve with time.  Numbness near the incision site is normal and will improve with time.     NOTE: Ensure/Remind patient to go to their follow-up appointment with their orthopedic surgeon, which is scheduled: [email or web address]    Abnormal Conditions - When to call the Surgeon:   Increasing/excessive redness, warmth or swelling at the incisional site not relieved with ice and elevation.  Increasing/excessive pain not well-controlled by prescribed medications.  Drainage or odor from or around the incision site.  (A little blood showing through the bandage is ok, but active leaking, of any color, coming out of the bandage is NOT normal.)   Temperature above 101 degrees. (A mild temp of 99 - 100 is normal - if temp gets to 101 you may use Tylenol once - if it does not improve, you may use a 2nd dose of Tylenol after 5 hours - if temperature is still at or above 101 degrees then call the surgeon.)   Calf tenderness, swelling, or redness or numbness of the foot/lower leg.  Shortness of breath or chest pain.  Any other incision or surgical-related concerns.  CALL SURGEON with concerns PRIOR TO sending patient to hospital.        Patient's personal belongings (please select all that are sent with patient):  None    RN SIGNATURE:  Electronically signed by Jaki Shane RN on 7/14/20 at 2:55 PM EDT    CASE MANAGEMENT/SOCIAL WORK SECTION    Inpatient Status Date:     Readmission Risk Assessment Score:  Readmission Risk              Risk of Unplanned Readmission:        8           Discharging to Facility/ [address]  P: [phone]  F: [phone]    / signature: Electronically signed by Jaki Shane RN on 7/14/20 at 2:46 PM EDT    PHYSICIAN SECTION    Prognosis: Good    Condition at Discharge: Stable    Rehab Potential (if transferring to Rehab): Good    Recommended Labs or Other Treatments After Discharge: PT/OT    Physician Certification: I certify the above information and transfer of Linda Bauer  is necessary for the continuing treatment of the diagnosis listed and that she requires Home Care for less 30 days.      Update Admission H&P: No change in H&P    PHYSICIAN SIGNATURE:  Electronically signed by Lucy Dotson MD on 7/14/20 at 2:36 PM EDT

## 2020-07-14 NOTE — INTERVAL H&P NOTE
Update History & Physical    The patient's History and Physical of June 30, 2020 was reviewed with the patient and I examined the patient. Here today for left total arthroplasty revision-poly exchange vs full revision. Changed Requip frequency to TID and she reports improved relief with neuropathy and restless leg. Saw Dr. Sury Gonzalez for medical clearance. NPO. Took Diovan this am with sip of water. No blood thinners in last week. Denies any current chest pain/pressure, palpitations, SOB, recent URI, nausea, vomiting, diarrhea, constipation, fever or chills. Vitals reviewed. Pt normotensive, afebrile.      Electronically signed by MU Kowalski CNP on 7/14/2020 at 7:49 AM

## 2020-07-14 NOTE — ANESTHESIA PROCEDURE NOTES
Patient : Marissa Tran Age: 76 year old Sex: female   MRN: [id number] Encounter Date: 6/14/2017      History     Chief Complaint   Patient presents with   • Fall     HPI     76 year old female patient presents with fall and hypoglycemia via EMS.  While at home tonight the patient started to feel \"woozy\" while laying in bed, she attempted to get up and was unable to falling to ground and hitting head against wood night stand.  A lower floor resident heard the thump and called EMS.  Patient found sitting on floor, confused, BS 21.  Glucose started in route to hospital with improvement in symptoms.  Patient complains of Neck pain and left great toe pain from the fall. She also has some left humerus pain, which is from a recent fracture (in sling in ED), this is unchanged.  Regarding the low BS, she reports no medication changes or dietary changes.     ALLERGIES:   Allergen Reactions   • Penicillins HIVES       Prior to Admission Medications    ASPIRIN 81 MG EC TABLET    Take 1 tablet by mouth daily.    DICLOFENAC (VOLTAREN) 1 % GEL    Apply 4 g topically 4 times daily. Apply to the right wrist and right knee.    FUROSEMIDE (LASIX) 20 MG TABLET    Take 1 tablet by mouth daily.    GEMFIBROZIL (LOPID) 600 MG TABLET    Take 600 mg by mouth 2 times daily (before meals).    GLIMEPIRIDE (AMARYL) 2 MG TABLET    Take 1 tablet by mouth daily (with breakfast).    HYDROXYZINE (ATARAX) 25 MG TABLET    Take 25 mg by mouth daily as needed for Itching.    METOPROLOL (LOPRESSOR) 100 MG TABLET    Take 0.5 tablets by mouth 2 times daily.    NICOTINIC ACID (NIACIN) 500 MG TABLET    Take 500 mg by mouth 2 times daily.    ONDANSETRON (ZOFRAN ODT) 4 MG DISINTEGRATING TABLET    Take 1-2 tablets by mouth every 8 hours as needed for Nausea.    POTASSIUM CHLORIDE 10 MEQ CR TABLET    Take 1 tablet by mouth daily.    RANITIDINE (ZANTAC) 150 MG TABLET    Take 150 mg by mouth 2 times daily.    SIMVASTATIN (ZOCOR) 20 MG TABLET    Take 20 mg by mouth  Peripheral Block    Patient location during procedure: PACU  Start time: 7/14/2020 12:50 PM  End time: 7/14/2020 1:05 PM  Staffing  Anesthesiologist: Edwina España MD  Performed: anesthesiologist   Preanesthetic Checklist  Completed: patient identified, site marked, surgical consent, pre-op evaluation, timeout performed, IV checked, risks and benefits discussed, monitors and equipment checked, anesthesia consent given, oxygen available and patient being monitored  Peripheral Block  Patient position: supine  Prep: ChloraPrep  Patient monitoring: cardiac monitor, continuous pulse ox, frequent blood pressure checks and IV access  Block type: Femoral  Laterality: left  Injection technique: single-shot  Procedures: ultrasound guided  Local infiltration: lidocaine  Infiltration strength: 1 %  Dose: 5 mL  Adductor canal  Provider prep: mask and sterile gloves  Local infiltration: lidocaine  Needle  Needle type: pencil-tip   Needle gauge: 22 G  Needle length: 10 cm  Needle localization: ultrasound guidance  Test dose: negative  Assessment  Injection assessment: negative aspiration for heme, no paresthesia on injection and local visualized surrounding nerve on ultrasound  Paresthesia pain: none  Slow fractionated injection: yes  Hemodynamics: stable  Medications Administered  Bupivacaine (MARCAINE) PF injection 0.5%, 20 mL  bupivacaine liposome (EXPAREL) injection 1.3%, 10 mL  Reason for block: post-op pain management and at surgeon's request nightly.    TRAMADOL (ULTRAM) 50 MG TABLET    Take 1 tablet by mouth every 4 hours as needed for Pain.       New Prescriptions    No medications on file       Past Medical History:   Diagnosis Date   • Diabetes mellitus (CMS/HCC)     ?  A1c/control-no records   • Essential (primary) hypertension    • GERD (gastroesophageal reflux disease)    • History of chronic back pain     S/P back surgery   • Hx Blood transfusion without reported diagnosis    • Hx Pneumonia    • Hx Sinusitis    • Hyperlipidemia LDL goal < 100    • Osteoporosis    • Renal insufficiency 7/19/2015    ?  Baseline, no records       Past Surgical History:   Procedure Laterality Date   • BACK SURGERY     • CHOLECYSTECTOMY     • NM GENARO PER RST/STRS PHARMACOLO     • SERVICE TO GASTROENTEROLOGY      gastric banding        Family History   Problem Relation Age of Onset   • Heart disease Mother    • Heart attack Mother    • Asthma Father    • Diabetes Sister    • Diabetes Brother    • Hypertension Brother    • Diabetes Brother    • Diabetes Brother    • Hypertension Brother    • Diabetes Paternal Grandmother        Social History   Substance Use Topics   • Smoking status: Never Smoker   • Smokeless tobacco: Never Used   • Alcohol use 0.0 oz/week     0 Standard drinks or equivalent per week      Comment: social holidays etc       Review of Systems   Constitutional: Negative for activity change, appetite change, chills, fatigue and fever.   HENT: Negative for congestion, rhinorrhea, sinus pressure and sore throat.    Eyes: Negative for photophobia and visual disturbance.   Respiratory: Negative for chest tightness and shortness of breath.    Cardiovascular: Negative for chest pain and palpitations.   Gastrointestinal: Negative for abdominal pain, diarrhea, nausea and vomiting.   Genitourinary: Negative for decreased urine volume, difficulty urinating, dysuria, flank pain and urgency.   Musculoskeletal: Negative for arthralgias, back pain, myalgias, neck pain  and neck stiffness.   Skin: Negative for color change, pallor, rash and wound.   Neurological: Negative for dizziness, syncope, weakness, light-headedness, numbness and headaches.   Hematological: Negative for adenopathy. Does not bruise/bleed easily.   Psychiatric/Behavioral: Negative for confusion. The patient is not nervous/anxious.        Physical Exam       ED Triage Vitals   ED Triage Vitals Group      Temp 06/14/17 0140 97.3 °F (36.3 °C)      Pulse 06/14/17 0140 54      Resp 06/14/17 0140 18      BP 06/14/17 0140 172/70      SpO2 06/14/17 0140 96 %      EtCO2 mmHg --       Height --       Weight 06/14/17 0140 225 lb (102.1 kg)      Weight Scale Used --        Physical Exam   Constitutional: She is oriented to person, place, and time. She appears well-developed and well-nourished.   HENT:   Head: Normocephalic and atraumatic.   Right Ear: External ear normal.   Left Ear: External ear normal.   Nose: Nose normal.   Mouth/Throat: Oropharynx is clear and moist.   Eyes: Conjunctivae and EOM are normal. Pupils are equal, round, and reactive to light.   Neck: Normal range of motion. Neck supple.       Cardiovascular: Normal rate, regular rhythm, normal heart sounds and intact distal pulses.    No murmur heard.  Pulmonary/Chest: Effort normal and breath sounds normal. No respiratory distress. She has no wheezes. She has no rales. She exhibits no tenderness.   Abdominal: Soft. Bowel sounds are normal. There is no tenderness.   Musculoskeletal: Normal range of motion. She exhibits no edema.        Right foot: There is tenderness and swelling.        Feet:    Left arm in sling from recent fracture     Neurological: She is alert and oriented to person, place, and time.   Skin: Skin is warm and dry.   Psychiatric: She has a normal mood and affect. Her behavior is normal. Judgment normal.         ED Course     Procedures    Lab Results     Results for orders placed or performed during the hospital encounter of 06/14/17    CBC & Auto Differential   Result Value Ref Range    WBC 12.2 (H) 4.2 - 11.0 K/mcL    RBC 3.75 (L) 4.00 - 5.20 mil/mcL    HGB 10.9 (L) 12.0 - 15.5 g/dL    HCT 34.5 (L) 36.0 - 46.5 %    MCV 92.0 78.0 - 100.0 fl    MCH 29.1 26.0 - 34.0 pg    MCHC 31.6 (L) 32.0 - 36.5 g/dL    RDW-CV 14.3 11.0 - 15.0 %     140 - 450 K/mcL    DIFF TYPE AUTOMATED DIFFERENTIAL     Neutrophil 87 %    LYMPH 6 %    MONO 7 %    EOSIN 0 %    BASO 0 %    Absolute Neutrophil 10.5 (H) 1.8 - 7.7 K/mcL    Absolute Lymph 0.8 (L) 1.0 - 4.0 K/mcL    Absolute Mono 0.9 0.3 - 0.9 K/mcL    Absolute Eos 0.0 (L) 0.1 - 0.5 K/mcL    Absolute Baso 0.0 0.0 - 0.3 K/mcL   Comprehensive Metabolic Panel   Result Value Ref Range    Sodium 136 135 - 145 mmol/L    Potassium 3.7 3.4 - 5.1 mmol/L    Chloride 99 98 - 107 mmol/L    Carbon Dioxide 28 21 - 32 mmol/L    Anion Gap 13 10 - 20 mmol/L    Glucose 192 (H) 65 - 99 mg/dL    BUN 22 (H) 6 - 20 mg/dL    Creatinine 1.25 (H) 0.51 - 0.95 mg/dL    GFR Estimate,  49     GFR Estimate, Non African American 42     BUN/Creatinine Ratio 18 7 - 25    CALCIUM 8.9 8.4 - 10.2 mg/dL    TOTAL BILIRUBIN 0.2 0.2 - 1.0 mg/dL    AST/SGOT 16 <38 Units/L    ALT/SGPT 13 <79 Units/L    ALK PHOSPHATASE 100 45 - 117 Units/L    TOTAL PROTEIN 6.8 6.4 - 8.2 g/dL    Albumin 2.8 (L) 3.6 - 5.1 g/dL    GLOBULIN 4.0 2.0 - 4.0 g/dL    A/G Ratio, Serum 0.7 (L) 1.0 - 2.4   Metered blood glucose   Result Value Ref Range    Glucose Bedside POC 85 65 - 99 mg/dL   Metered blood glucose   Result Value Ref Range    Glucose Bedside POC 38 (LL) 65 - 99 mg/dL   Metered blood glucose   Result Value Ref Range    Glucose Bedside  (H) 65 - 99 mg/dL           Radiology Results     Imaging Results         XR Toe 2+ View Left (Final result) Result time:  06/14/17 04:04:46    Procedure changed from XR Toe 2+ View Right        Final result    Impression:    IMPRESSION:   No acute fracture or malalignment of the left great toe.      Narrative:     EXAM:  XR TOE 2+ VW LEFT    CLINICAL HISTORY: Trauma     COMPARISON:  None.    TECHNIQUE:  3 views, left great toe    FINDINGS:  No acute fracture or malalignment of the left great toe or other  visualized osseous structures of the forefoot. Moderate 1st MTP  degenerative joint disease. Symphalangism of the 4th and 5th toes is  incidentally noted.              CT Cervical Spine (Final result) Result time:  06/14/17 02:54:02    Final result    Impression:    IMPRESSION:   Stable exam without CT evidence for acute intracranial process.            CT C-SPINE    CLINICAL HISTORY:  Trauma    TECHNIQUE:  2.5 mm axial collimation through the cervical spine with  coronal and sagittal reformatting.     COMPARISON:  None.    FINDINGS:      There is no evidence of acute fracture or subluxation. The prevertebral  soft tissues are within normal limits.    Moderate multilevel degenerative changes are noted including disc height  loss, osteophyte formation, and facet arthropathy that is most prominent at  C3-C4, C4-C5, and C6-C7.    IMPRESSION:      No acute findings. Moderate multilevel degenerative changes.      Narrative:    CT HEAD WITHOUT IV CONTRAST    INDICATION:  Trauma    TECHNIQUE:  Head CT performed with 5 mm contiguous axial images from the  skull base to the vertex without IV contrast.    COMPARISON:  7/19/2015.    FINDINGS:      There is no evidence of acute intracranial hemorrhage, mass effect, or  midline shift. The basal cisterns are clear. The ventricles are normal in  size, shape, and position.                     CT Head Brain (Final result) Result time:  06/14/17 02:54:02    Final result    Impression:    IMPRESSION:   Stable exam without CT evidence for acute intracranial process.            CT C-SPINE    CLINICAL HISTORY:  Trauma    TECHNIQUE:  2.5 mm axial collimation through the cervical spine with  coronal and sagittal reformatting.     COMPARISON:  None.    FINDINGS:      There is no evidence of acute  fracture or subluxation. The prevertebral  soft tissues are within normal limits.    Moderate multilevel degenerative changes are noted including disc height  loss, osteophyte formation, and facet arthropathy that is most prominent at  C3-C4, C4-C5, and C6-C7.    IMPRESSION:      No acute findings. Moderate multilevel degenerative changes.      Narrative:    CT HEAD WITHOUT IV CONTRAST    INDICATION:  Trauma    TECHNIQUE:  Head CT performed with 5 mm contiguous axial images from the  skull base to the vertex without IV contrast.    COMPARISON:  7/19/2015.    FINDINGS:      There is no evidence of acute intracranial hemorrhage, mass effect, or  midline shift. The basal cisterns are clear. The ventricles are normal in  size, shape, and position.                       ED Medication Orders     Start Ordered     Status Ordering Provider    06/14/17 0338 06/14/17 0337  HYDROcodone-acetaminophen (NORCO) 5-325 MG per tablet 1 tablet  ONCE      Last MAR action:  Given ALEX WILSON    06/14/17 0334 06/14/17 0333  dextrose 50 % injection 25 g  ONCE      Last MAR action:  Given ALEX WILSON          [facility]    Patient presented after fall from hypoglycemia, seems stable now, CT and Xrays show no trauma, c spine cleared.    Patient tolerated PO diet with normalized blood sugar level.     3:33 AM  Patient BS 38 -> D50     4:22 AM  Patient continues to have low BS and is uncomfortable going home in her condition  Dr. Duron contacted who will admit to hospital for monitoring.       Clinical Impression     ED Diagnoses        Final diagnoses    Hypoglycemia     Fall, initial encounter           Disposition        Admit  Patient accepted and admission order received from:: DEMOND DURON [[id number]]  Patient Class: Observation [4]  Patient on Telemetry: Yes  Has physician to physician communication occurred?: Yes  Transferring Patient to? Only adjust for transfers between [facility] ([facility], [facility]: [facility]  [place] [[id number]]           Azar Castillo, DO  06/14/17 0428

## 2020-07-14 NOTE — FLOWSHEET NOTE
Patient alone in room waiting for her spouse and friend to return. Patient stated she sent them out to get something to eat. Patient stated that she is thankful to have her surgery behind her. Patient shared with writer of her 13year old daughter's death [de-identified] years ago and how she was missing her.  was grateful to be a listening presence. She talked about her strong Kin[place] in God, even through the hard times in her life. Patient has a good support system through her  who is limited d/t his poor eyesight, her kinsey community friends and neighbors. Chaplains will remain available to offer spiritual and emotional support as needed. 07/14/20 1730   Encounter Summary   Services provided to: Patient   Referral/Consult From: [address] [place] [address] Spouse;Friends/neighbors; Catholic/kinsey community   Continue Visiting   (7/14/20)   Complexity of Encounter Low   Length of Encounter 15 minutes   Spiritual Assessment Completed Yes   Routine   Type Initial   Assessment Calm; Approachable   Intervention Active listening;Explored feelings, thoughts, concerns;Nurtured hope;Prayer;Sustaining presence/ Ministry of presence; Discussed illness/injury and it's impact; Discussed belief system/Anabaptist practices/kinsey   Outcome Expressed gratitude;Engaged in conversation;Expressed feelings/needs/concerns; Shared reminiscences; Hopeful;Receptive   Visited by Peyton Sarmiento

## 2020-07-14 NOTE — FLOWSHEET NOTE
Patient discharged home accompaied by a friend and her spouse. Personal belongings sent with the patient.

## 2020-07-15 ENCOUNTER — TELEPHONE (OUTPATIENT)
Dept: PRIMARY CARE CLINIC | Age: 80
End: 2020-07-15

## 2020-07-15 LAB — SURGICAL PATHOLOGY REPORT: NORMAL

## 2020-07-15 NOTE — TELEPHONE ENCOUNTER
Tyler 45 Transitions Initial Follow Up Call    Outreach made within 2 business days of discharge: Yes    Patient: Manan Ho Patient : 1940   MRN: [id number]  Reason for Admission: There are no discharge diagnoses documented for the most recent discharge. Discharge Date: 20       Spoke with: Ramon Stack    Discharge department/facility: [facility] [address] Interactive Patient Contact:  Was patient able to fill all prescriptions: Yes  Was patient instructed to bring all medications to the follow-up visit: Yes  Is patient taking all medications as directed in the discharge summary? Yes  Does patient understand their discharge instructions: Yes  Does patient have questions or concerns that need addressed prior to 7-14 day follow up office visit: no    Patient denied follow up- patient states she wants to follow up with her surgeon.      Scheduled appointment with PCP within 7-14 days    Follow Up  Future Appointments   Date Time Provider Gera Amanda   2020 11:45 AM Nancy Lanza [address] [place] [address]   2020  9:30 AM Darlyn Ward [place], [place]

## 2020-07-15 NOTE — DISCHARGE SUMMARY
Discharge Summary     Patient ID:  Gonzalez Sanz  [id number]  70 y.o.  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 Patient was discharged home day of surgery    Consults: none    Treatments: surgery and PT    Disposition: home    Patient Instructions:   Discharge Medication List as of 7/14/2020  5:42 PM      START taking these medications    Details   oxyCODONE (ROXICODONE) 5 MG immediate release tablet Take 1 tablet by mouth every 4 hours as needed for Pain for up to 7 days. , Disp-40 tablet,R-0Print      aspirin 81 MG EC tablet Take 1 tablet by mouth 2 times daily, Disp-30 tablet,R-3Normal         CONTINUE these medications which have NOT CHANGED    Details   irbesartan (AVAPRO) 75 MG tablet Take 1 tablet by mouth daily, Disp-30 tablet, R-3Normal      nystatin-triamcinolone (MYCOLOG II) 398323-9.1 UNIT/GM-% cream Apply topically 4 times daily Apply topically 4 times daily. , Topical, 4 TIMES DAILY Starting u 7/2/2020, Disp-30 g, R-3, Normal      rOPINIRole (REQUIP) 1 MG tablet Take by mouth nightly Historical Med      LIVALO 2 MG TABS tablet Take 2 mg by mouth nightly , DAWHistorical Med      donepezil (ARICEPT) 10 MG tablet Take 10 mg by mouth nightly Historical Med      trospium (SANCTURA) 60 MG CP24 extended release capsule TAKE 1 CAPSULE BY MOUTH ONE TIME A DAY, Disp-90 capsule, R-2Normal      estradiol (ESTRACE VAGINAL) 0.1 MG/GM vaginal cream Place 1 g vaginally Twice a Week, Disp-1 Tube, R-5Normal      FARXIGA 10 MG tablet TAKE 1 TABLET BY MOUTH IN THE MORNING , Disp-30 tablet, R-11Normal      CRANBERRY PO Take by mouth 2 times daily      glipiZIDE (GLUCOTROL) 5 MG tablet 2.5mg in the am and 2.5 mg in the pmHistorical Med      Continuous Blood Gluc Sensor (FREESTYLE DAVIS 14 DAY SENSOR) MISC Historical Med      Salicylic Acid (COMPOUND W EX) Historical Med      !! Multiple Vitamins-Minerals (THERAPEUTIC MULTIVITAMIN-MINERALS) tablet Take 1 tablet by mouth dailyHistorical Med      !! Multiple Vitamins-Minerals (PRESERVISION AREDS PO) Take by mouth daily Historical Med      Continuous Blood Gluc  (FREESTYLE DAVIS 14 DAY READER) MATTHEW Historical Med      dexlansoprazole (DEXILANT) 60 MG CPDR delayed release capsule Take 60 mg by mouth daily 2 hours before bedHistorical Med      vitamin D (ERGOCALCIFEROL) 77516 units CAPS capsule TAKE 1 CAPSULE BY MOUTH ONE TIME A WEEK , Disp-4 capsule, R-1Normal       !! - Potential duplicate medications found. Please discuss with provider. Activity: activity as tolerated  Diet: regular diet  Wound Care: keep wound clean and dry    Follow-up with Dr. Dale Cuenca in 2 weeks.     Electronically signed by Genoveva Mistry MD on 7/15/2020 at 11:37 AM

## 2020-07-20 NOTE — TELEPHONE ENCOUNTER
Her PT called today because [facility] has started having muscle spasms. They just started yesterday from the quads up to her hips. Anytime they try to bend the knee she is getting spasms and it is affecting her ROM. Do you want to maybe give her a muscle relaxant?

## 2020-07-22 RX ORDER — TIZANIDINE 4 MG/1
4 TABLET ORAL 4 TIMES DAILY PRN
Qty: 40 TABLET | Refills: 0 | Status: SHIPPED | OUTPATIENT
Start: 2020-07-22 | End: 2021-04-16

## 2020-07-29 ENCOUNTER — OFFICE VISIT (OUTPATIENT)
Dept: ORTHOPEDIC SURGERY | Age: 80
End: 2020-07-29

## 2020-07-29 VITALS — TEMPERATURE: 97.9 F

## 2020-07-29 PROCEDURE — 99024 POSTOP FOLLOW-UP VISIT: CPT | Performed by: PHYSICIAN ASSISTANT

## 2020-07-29 ASSESSMENT — ENCOUNTER SYMPTOMS
NAUSEA: 0
EYES NEGATIVE: 1
RHINORRHEA: 0
COLOR CHANGE: 0
VOMITING: 0
COUGH: 0

## 2020-07-29 NOTE — PROGRESS NOTES
Surgical History:   Procedure Laterality Date    APPENDECTOMY  1962    BLADDER SURGERY      bladder stimulator    BLADDER SUSPENSION  2002    multiple    CARDIAC CATHETERIZATION  2008    no stents    CARDIOVASCULAR STRESS TEST  12/2014    CATARACT REMOVAL WITH IMPLANT Left 11/07/2017    [facility]    CATARACT REMOVAL WITH IMPLANT Right 11/28/2017    [facility]    COLON SURGERY      colostomy reversal    COLONOSCOPY  4/7/16    tubular adenoma x3; internal hemorrhoids    COLONOSCOPY N/A 11/6/2019    COLONOSCOPY DIAGNOSTIC performed by Alesia Willett MD at [address] [place] [address]    bowel obstruction/ rupture from diverticular disease, reversal 3 mos later    CYSTOSCOPY  09/08/2017    W/ 200IU Botox     FRACTURE SURGERY Right 2011    hip    FRACTURE SURGERY Left 2001    [facility]    FRACTURE SURGERY Left 2013    ankle    HERNIA REPAIR      HYSTERECTOMY  1969    JOINT REPLACEMENT Bilateral 2000    BILAT KNEES    LUMBAR FUSION  2017    L2/L3    OR XCAPSL CTRC RMVL INSJ IO LENS PROSTH W/O ECP Left 11/7/2017    EYE CATARACT EMULSIFICATION IOL IMPLANT performed by Afia Austin MD at [facility] W/O ECP Right 11/28/2017    EYE CATARACT EMULSIFICATION IOL IMPLANT performed by Afia Austin MD at [facility] Right 10/27/15    WITH POLY EXCHANGE BIOMET AND GPS APPLICATION    REVISION TOTAL KNEE ARTHROPLASTY Left 7/14/2020    KNEE TOTAL ARTHROPLASTY REVISION - POLY EXCHANGE performed by Ha Davis MD at [address] [facility]  2010    fusion, did not take   [address]    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 Maternal Grandmother     Cancer Brother 46        bronchogenic adenocarcinoma cancer    Lung Cancer Brother     Cancer Sister         lung    Lung Cancer Sister          72    Breast Cancer Sister          62    Cancer Sister         breast     Social History     Occupational History    Occupation: retired   Tobacco Use    Smoking status: Former Smoker     Packs/day: 0.50     Years: 20.00     Pack years: 10.00     Last attempt to quit: 1982     Years since quittin.1    Smokeless tobacco: Former User     Quit date: 1982   Substance and Sexual Activity    Alcohol use: No    Drug use: No    Sexual activity: Not on file        Review of Systems   Constitutional: Negative for chills and fever. HENT: Negative for congestion and rhinorrhea. Eyes: Negative. Respiratory: Negative for cough. Cardiovascular: Negative for chest pain and leg swelling. Gastrointestinal: Negative for nausea and vomiting. Musculoskeletal:        S/p Left TKA poly-exchange   Skin: Negative for color change and rash. Neurological: Negative for dizziness and numbness. Physical Exam  Constitutional:       Appearance: She is well-developed. HENT:      Head: Normocephalic and atraumatic. Neck:      Musculoskeletal: Normal range of motion and neck supple. Cardiovascular:      Rate and Rhythm: Normal rate. Pulmonary:      Effort: Pulmonary effort is normal.   Abdominal:      Palpations: Abdomen is soft. Musculoskeletal:      Comments: left Knee   Incision: Is healing well clean dry and intact without evidence of incisional erythema or drainage.   Swelling: Mild  Effusion: Negative  Tenderness: None     Range of Motion:     Extension: 5    Flexion: 100     Knee is stable to varus valgus stress   Skin:     General: Skin is warm and dry. Findings: No rash. Neurological:      Mental Status: She is alert and oriented to person, place, and time. Psychiatric:         Behavior: Behavior normal.         Assessment:     Encounter Diagnoses   Name Primary?  Chronic pain of left knee Yes    S/P revision of total knee, left          X-Rays taken in clinic today and preliminarily reviewed by me  AP and lateral views of the left knee demonstrate patient is status post left total knee arthroplasty and recent poly-exchange. Components appear in excellent position without evidence of acute complication or fracture. Plan:     Ms. Demarcus Michaud is a 76-year-old female status post 7/14/2020 poly-exchange left total knee arthroplasty. She is doing very well postoperatively. - Order for PT given  - Continue with current pain control: Ultram prn  - F/u in 4 weeks with Dr. Dago Colón however patient may call or return sooner for any questions or concerns    Orders Placed This Encounter   Procedures    XR KNEE LEFT (1-2 VIEWS)     Standing Status:   Future     Number of Occurrences:   1     Standing Expiration Date:   7/29/2021   Nahid Nicole 82.     Referral Priority:   Routine     Referral Type:   Eval and Treat     Referral Reason:   Specialty Services Required     Requested Specialty:   Physical Therapy     Number of Visits Requested:   [address], [address]    Please note that this chart was generated using voicerecognition Dragon dictation software. Although every effort was made to ensurethe accuracy of this automated transcription, some errors in transcription may haveoccurred.

## 2020-07-31 ENCOUNTER — TELEPHONE (OUTPATIENT)
Dept: ORTHOPEDIC SURGERY | Age: 80
End: 2020-07-31

## 2020-07-31 NOTE — TELEPHONE ENCOUNTER
Alexandru Washington  Patient will be discharged from home PT and will begin outpatient PT at [facility] [address] on 8/5/20. She is doing well. Patient would like to know if she needs to continue taking Aspirin daily?     Call patient with answer

## 2020-08-04 RX ORDER — DEXLANSOPRAZOLE 60 MG/1
60 CAPSULE, DELAYED RELEASE ORAL DAILY
Qty: 30 CAPSULE | Refills: 3 | Status: SHIPPED | OUTPATIENT
Start: 2020-08-04 | End: 2021-01-04

## 2020-08-05 ENCOUNTER — HOSPITAL ENCOUNTER (OUTPATIENT)
Dept: PHYSICAL THERAPY | Age: 80
Setting detail: THERAPIES SERIES
Discharge: HOME | End: 2020-08-05
Payer: MEDICARE

## 2020-08-05 PROCEDURE — 97161 PT EVAL LOW COMPLEX 20 MIN: CPT

## 2020-08-05 PROCEDURE — 97110 THERAPEUTIC EXERCISES: CPT

## 2020-08-05 NOTE — CONSULTS
[facility] Out[facility] Physical Therapy              [address] [facility] [address]              Phone: [phone]              Fax: [phone]        Physical Therapy Lower Extremity Evaluation    Date:  2020  Patient: La Castaneda  : 1940  MRN: [id number]  Physician: AC Silva     Insurance: Medicare  Medical Diagnosis: Chronic pain of left knee, S/P revision of total knee, left   Rehab Codes: M25.562, G89.29, Z96.652  Onset date: 20   Next 's appt.: 20    Subjective:   CC/HPI: Pt reports to PT following revision of L TKA. Pt reports that she was doing home therapy following revision and was doing well. Pt states that she is doing better since having the revision, and states that she is sleeping better as well. Pt reports she has neuropathy which affects her balance. States that she needs to loosen her leg up. Pt notes that she was able to achieve 106* knee flexion during home therapy. PMHx: [] Unremarkable [] Diabetes [] HTN  [] Pacemaker   [] MI/Heart Problems [] Cancer [] Arthritis   [] Other:              [x] Refer to full medical chart  In EPIC     Tests: [] X-Ray:    [] MRI:    [] Other:     Medications:  [x] Refer to full medical record [] None [] Other:  Allergies:       [x] Refer to full medical record [] None [] Other:      Gait Prior level of function Current level of function    [x] Independent  [] Assist [x] Independent  [] Assist   Device: [] Independent [] Independent    [x] Straight Cane [] Quad cane [x] Straight Cane [] Quad cane    [] Standard walker [] Rolling walker   [] 4 wheeled walker [] Standard walker [] Rolling walker   [] 4 wheeled walker    [] Wheelchair [] Wheelchair       Marital Status    Home type Mobile home   Stairs from outside 5 REBECCA, taylor rails   Stairs inside --   Employement Retired   Job status --   Work Activities/duties  --   Recreational Activities Walking, sitting to sew       Pain present?  No   Location --   Pain Rating currently 0/10   Pain at worse 6/10   Pain at best 0/10   Description of pain Intermittent, burning, aching   Altered Sensation Reports she has neuropathy   What makes it worse Leg in down position, when sitting   What makes it better --   Symptom progression Gotten better   Sleep Sleep affected             Objective:    ROM  ° A/P STRENGTH    Left Right Left Right   Hip Flex   3+/5 4/5   Ext       ER       IR       ABD       ADD       Knee Flex 104* 118* 4-/5 4/5   Ext 11 from 0 5 from 0 3+/5 4/5   Ankle DF       PF       INV       EVER              - Pt unable to lay supine d/t R hip pain, tested knee ROM in sitting      OBSERVATION No Deficit Deficit Not Tested Comments   Posture       Forward Head [] [] []    Rounded Shoulders [] [] []    Kyphosis [] [] []    Lordosis [] [] []    Lateral Shift [] [] []    Scoliosis [] [] []    Iliac Crest [] [] []    PSIS [] [] []    ASIS [] [] []    Genu Valgus [] [] []    Genu Varus [] [] []    Genu Recurvatum [] [] []    Pronation [] [] []    Supination [] [] []    Leg Length Discrp [] [] []    Slumped Sitting [] [] []    Palpation [] [x] [] Tenderness to palpation along L knee diffusely, L quad, L HS insertion   Sensation [] [x] [] Reports she has neuropathy   Edema [] [] []    Neurological [] [] [x]    Gait [] [x] [] Analysis: Walks in with SC, pronated taylor, poor TKE with gait, slowed jia         FUNCTION Normal Difficult Unable   Sitting [x] [] []   Standing [x] [] []   Ambulation [] [x] []   Groom/Dress [x] [] []   Lift/Carry [] [x] []   Stairs [x] [] []   Bending [] [x] []   Squat [] [] [x]   Kneel [] [] [x]         BALANCE/PROPRIOCEPTION              [x] Not tested   Single leg stance       R                     L                                PAIN   Eyes open                             Sec. Sec                  . []    Eyes closed                          Sec. Sec                  . []         Flexibility Normal Left tight Right tight   Hip flexor [] [] [x]   quad [] [x] []   HS [] [x] [x]   piriformis [] [x] [x]   ITB [] [] []   gastroc [] [x] [x]   Soleus  [] [x] [x]    [] [] []    [] [] []                                             Keyes Fall Risk Assessment    Patient Name:  Rudi Boss  : 1940        Risk Factor Scale  Score   History of Falls [x] Yes  [] No 25  0 25   Secondary Diagnosis [x] Yes  [] No 15  0 15   Ambulatory Aid [] Furniture  [x] Crutches/cane/walker  [] None/bedrest/wheelchair/nurse 30  15  0 15   IV/Heparin Lock [] Yes  [x] No 20  0 0   Gait/Transferring [] Impaired  [] Weak  [x] Normal/bedrest/immobile 20  10  0 0   Mental Status [] Forgets limitations  [x] Oriented to own ability 15  0 0      Total: 55     Based on the Assessment score: check the appropriate box. []  No intervention needed   Low =   Score of 0-24    []  Use standard prevention interventions Moderate =  Score of 24-44   [] Give patient handout and discuss fall prevention strategies   [] Establish goal of education for patient/family RE: fall prevention strategies    [x]  Use high risk prevention interventions High = Score of 45 and higher   [x] Give patient handout and discuss fall prevention strategies   [x] Establish goal of education for patient/family Re: fall prevention strategies   [x] Discuss lifeline / other resources    Electronically signed by: Margo Greenwood PT  Date: 2020        Assessment: Pt presents to PT with decreased L knee ROM and strength of L LE grossly, as well as antalgic gait. Pt would benefit from PT in order to help improve her strength and ROM in her L LE to assist with easing functional mobility and reducing gait deviations present. STG: (to be met in 6 treatments)  1. ? Pain: Decrease pain levels to 3/10 with all activities to ease ADLs. 2. ? ROM: Increase AROM of L knee to equal R to help improve gait mechanics and ease functional mobility.   3. Independent with Home Exercise Programs    LTG: (to be met in 12 treatments)  1. Improve score on KOOS, Jr from 29% impaired to <10% impaired to allow pt to return to recreational walking. 2. ? Strength: Increase L LE strength to 5/5 grossly to improve functional mobility, ease stairs, and improve safety by decreasing risk for falls. 3. Reduce pain levels to 0/10  4. Demonstrate Knowledge of fall prevention                   Patient goals: \"Walking, returning to normal routine\"    Rehab Potential:  [x] Good  [] Fair  [] Poor   Suggested Professional Referral:  [x] No  [] Yes:  Barriers to Goal Achievement[de-identified]  [x] No  [] Yes:  Domestic Concerns:  [x] No  [] Yes:    Pt. Education:  [x] Plans/Goals, Risks/Benefits discussed  [x] Home exercise program    Method of Education: [x] Verbal  [x] Demo  [x] Written  Comprehension of Education:  [x] Verbalizes understanding. [x] Demonstrates understanding. [x] Needs Review. [] Demonstrates/verbalizes understanding of HEP/Ed previously given. Treatment Plan:  [x] Therapeutic Exercise    [] Aquatic Therapy   [x] Manual Therapy     [] Electrical Stimulation  [x] Instruction in HEP      [] Lumbar/Cervical Traction  [] Neuromuscular Re-education [] Cold/hotpack  [] Iontophoresis: 4 mg/mL  [x] Vasocompression (GameReady)                    Dexamethasone Sodium  [] Gait Training             Phosphate 40-80 mAmin         []  Medication allergies reviewed for use of    Dexamethasone Sodium Phosphate 4mg/ml     with iontophoresis treatments. Pt is not allergic.     Frequency:  2-3 x/week for 12 visits    Todays Treatment:    Exercises:  Exercise  L Knee   Reps/ Time Weight/ Level Comments   HS step S      SB S      Seated knee flexion S 2x30\"     Seated knee ext S 2x30\"     Seated marches x10     LAQ x10, 2\"           Step ups                                                Other:    Specific Instructions for next treatment: Continue tx per POC    Evaluation Complexity:  History (Personal factors, comorbidities) [] 0 [] 1-2 [x] 3+   Exam (limitations, restrictions) [] 1-2 [x] 3 [] 4+   Clinical presentation (progression) [x] Stable [] Evolving  [] Unstable   Decision Making [x] Low [] Moderate [] High    [x] Low Complexity [] Moderate Complexity [] High Complexity       Treatment Charges: Mins Units   [x] Evaluation       [x]  Low       []  Moderate       []  High 35 1   []  Modalities     [x]  Ther Exercise 8 1   []  Manual Therapy     []  Ther Activities     []  Aquatics     []  Vasocompression     []  Other       TOTAL TREATMENT TIME: 43    Time in: 9060    Time Out: 0419    Electronically signed by: Shanda Villatoro PT        Physician Signature:________________________________Date:__________________  By signing above or cosigning this note, I have reviewed this plan of care and certify a need for medically necessary rehabilitation services.      *PLEASE SIGN ABOVE AND FAX BACK ALL PAGES*

## 2020-08-06 ENCOUNTER — HOSPITAL ENCOUNTER (OUTPATIENT)
Dept: PHYSICAL THERAPY | Age: 80
Setting detail: THERAPIES SERIES
Discharge: HOME | End: 2020-08-06
Payer: MEDICARE

## 2020-08-06 PROCEDURE — 97016 VASOPNEUMATIC DEVICE THERAPY: CPT

## 2020-08-06 PROCEDURE — 97110 THERAPEUTIC EXERCISES: CPT

## 2020-08-06 NOTE — PROGRESS NOTES
[facility] Outpatient Physical Therapy   [address]   Phone: [phone]   Fax: [phone]    Physical Therapy Daily Treatment Note      Date:  2020  Patient Name:  Elysia Faulkner    :  1940  MRN: [id number]  Physician: AC Roper                                             Insurance: Medicare  Medical Diagnosis: Chronic pain of left knee, S/P revision of total knee, left                     Rehab Codes: M25.562, G89.29, Z96.652  Onset date: 20                           Next 's appt.: 20  Visit# / total visits: 2  Cancels/No Shows: 0/0    Subjective:    Patient has no new complaint since yesterdays eval.   Pain:  [x] Yes  [x] No Location: L knee Pain Rating: (0-10 scale) denies/10  Pain altered Tx:  [] No  [] Yes  Action:  Comments:    Objective:  Modalities: vasocompression L knee 34* 15' low pressure (Patient is tender to touch on (B) lower shins so proceed with caution when setting up)  Precautions:  Exercises:  Exercise  L Knee    Reps/ Time Weight/ Level Comments   Nustep  5'     HS step S         SB S 3x30\"       Seated knee flexion S 2x30\"       Seated knee ext S 2x30\"       Seated marches x10       LAQ x10, 2\"       Seated add squeeze 10x 5\"     Seated abd with TB 10x5\" Orange TB                    Step ups 10x2 6'     Standing HS curls  10x2        Standing mini squat  10x                                                         Other:    Specific Instructions for next treatment:      Assessment: continued with therex with emphasis on standing strengthening exercises. Patient required small seated rest breaks but overall good tolerance ended with vasocompression to help reduce swelling. [x] Progressing toward goals. [] No change.      [] Other:    [] Patient would continue to benefit from skilled physical therapy services in order to:     STG: (to be met in 6 treatments)  1. ? Pain: Decrease pain levels to 3/10 with all activities to ease ADLs.  2. ? ROM: Increase AROM of L knee to equal R to help improve gait mechanics and ease functional mobility. 3. Independent with Home Exercise Programs     LTG: (to be met in 12 treatments)  1. Improve score on KOOS, Jr from 29% impaired to <10% impaired to allow pt to return to recreational walking. 2. ? Strength: Increase L LE strength to 5/5 grossly to improve functional mobility, ease stairs, and improve safety by decreasing risk for falls. 3. Reduce pain levels to 0/10  4. Demonstrate Knowledge of fall prevention                    Patient goals: \"Walking, returning to normal routine\"    Pt. Education:  [x] Yes  [] No  [] Reviewed Prior HEP/Ed  Method of Education: [x] Verbal  [] Demo  [] Written  Comprehension of Education:  [x] Verbalizes understanding. [] Demonstrates understanding. [] Needs review. [] Demonstrates/verbalizes HEP/Ed previously given. Plan: [x] Continue per plan of care.    [] Other:      Treatment Charges: Mins Units   []  Modalities     [x]  Ther Exercise 25 2   []  Manual Therapy     []  Ther Activities     []  Aquatics     []  Neuromuscular     [x] Vasocompression 15 1   [] Gait Training     [] Dry needling        [] 1 or 2 muscles        [] 3 or more muscles     []  Other     Total Treatment time 40 3     Time In:1000am           Time Out: 1045am    Electronically signed by:  Leonides Lerner PTA

## 2020-08-10 ENCOUNTER — HOSPITAL ENCOUNTER (OUTPATIENT)
Dept: PHYSICAL THERAPY | Age: 80
Setting detail: THERAPIES SERIES
Discharge: HOME | End: 2020-08-10
Payer: MEDICARE

## 2020-08-10 PROCEDURE — 97110 THERAPEUTIC EXERCISES: CPT

## 2020-08-10 NOTE — PROGRESS NOTES
509 [facility] Outpatient Physical Therapy   [address]   Phone: [phone]   Fax: [phone]    Physical Therapy Daily Treatment Note      Date:  8/10/2020  Patient Name:  Danielle Kilpatrick    :  1940  MRN: [id number]  Physician: AC Hoffman                                             Insurance: Medicare  Medical Diagnosis: Chronic pain of left knee, S/P revision of total knee, left                     Rehab Codes: M25.562, G89.29, Z96.652  Onset date: 20                           Next Dr's appt.: 20  Visit# / total visits: 3  Cancels/No Shows: 0/0    Subjective:    Patient reports that when she has appointments she needs to have her phone and be done as quickly but efficiently as possible due to her  being legally  Blind, 81, and requires constant supervision so patient requests to only do exercise this date and possibly resume ice next treatment. Pain:  [x] Yes  [x] No Location: L knee Pain Rating: (0-10 scale) denies/10  Pain altered Tx:  [] No  [] Yes  Action:  Comments:    Objective:  Modalities: vasocompression L knee 34* 15' low pressure (Patient is tender to touch on (B) lower shins so proceed with caution when setting up) held 8/10/20 per patient request    Precautions:  Exercises:  Exercise  L Knee    Reps/ Time Weight/ Level Comments   Nustep  5'     HS step S         SB S 3x30\"       Seated knee flexion S 2x30\"       Seated knee ext S 2x30\"       Seated marches x10       LAQ x10, 2\"       Seated add squeeze 10x 5\"     Seated abd with TB 10x5\" Orange TB                    Step ups 10x2 6'     Standing HS curls  10x2        Standing mini squat  10x                                                         Other:    Specific Instructions for next treatment:      Assessment: Patient required cueing to take appropriate rest breaks and avoid over exertion during session.  Patient very sensitive to light touch on (B) legs to when completing band work therapist must be as gentle as possible. [x] Progressing toward goals. [] No change. [] Other:    [] Patient would continue to benefit from skilled physical therapy services in order to:     STG: (to be met in 6 treatments)  1. ? Pain: Decrease pain levels to 3/10 with all activities to ease ADLs. 2. ? ROM: Increase AROM of L knee to equal R to help improve gait mechanics and ease functional mobility. 3. Independent with Home Exercise Programs     LTG: (to be met in 12 treatments)  1. Improve score on KOOS, Jr from 29% impaired to <10% impaired to allow pt to return to recreational walking. 2. ? Strength: Increase L LE strength to 5/5 grossly to improve functional mobility, ease stairs, and improve safety by decreasing risk for falls. 3. Reduce pain levels to 0/10  4. Demonstrate Knowledge of fall prevention                    Patient goals: \"Walking, returning to normal routine\"    Pt. Education:  [x] Yes  [] No  [] Reviewed Prior HEP/Ed  Method of Education: [x] Verbal  [] Demo  [] Written  Comprehension of Education:  [x] Verbalizes understanding. [] Demonstrates understanding. [] Needs review. [] Demonstrates/verbalizes HEP/Ed previously given. Plan: [x] Continue per plan of care.    [] Other:      Treatment Charges: Mins Units   []  Modalities     [x]  Ther Exercise 35 2   []  Manual Therapy     []  Ther Activities     []  Aquatics     []  Neuromuscular     [x] Vasocompression     [] Gait Training     [] Dry needling        [] 1 or 2 muscles        [] 3 or more muscles     []  Other     Total Treatment time 35 2     Time In:1050am           Time Out: 1125am    Electronically signed by:  Fabian Pandya PTA

## 2020-08-13 ENCOUNTER — HOSPITAL ENCOUNTER (OUTPATIENT)
Dept: PHYSICAL THERAPY | Age: 80
Setting detail: THERAPIES SERIES
Discharge: HOME | End: 2020-08-13
Payer: MEDICARE

## 2020-08-13 PROCEDURE — 97110 THERAPEUTIC EXERCISES: CPT

## 2020-08-13 NOTE — FLOWSHEET NOTE
509 [facility] Out[facility]   [address]   Phone: [phone]   Fax: [phone]    Physical Therapy Daily Treatment Note      Date:  2020  Patient Name:  Elysia Faulkner    :  1940  MRN: [id number]  Physician: AC Ropre                                             Insurance: Medicare  Medical Diagnosis: Chronic pain of left knee, S/P revision of total knee, left                     Rehab Codes: M25.562, G89.29, Z96.652  Onset date: 20                           Next 's appt.: 20  Visit# / total visits:   Cancels/No Shows: 0/0    Subjective:    Pt presents to PT stating that she has been having spasms in her knee this morning while also reporting some increased swelling in her L ankle. Denies any pain today, only reports an ache. Pain:  [] Yes  [x] No  Location: L knee Pain Rating: (0-10 scale) denies/10  Pain altered Tx:  [x] No  [] Yes  Action:  Comments:    Objective:  Modalities: vasocompression L knee 34* 15' low pressure (Patient is tender to touch on (B) lower shins so proceed with caution when setting up)- HELD TODAY   Precautions:  Exercises:  Exercise  L Knee    Reps/ Time Weight/ Level Comments    Nustep  5'   x   HS step S          SB S 5x30\"     x   Seated knee flexion S 5x30\"     x   Seated knee ext S 2x30\"        Seated marches x10        LAQ 2x10, 2\"     x   Seated add squeeze 10x 5\"      Seated abd with TB 10x5\" Orange TB  x                     Step ups 10x2 6'   x   Standing HS curls  10x2         Standing mini squat  2x10     x               Prone knee ext                                           Other:    Specific Instructions for next treatment: Try prone knee ext S      Assessment: Continued per treatment plan above. Continued with focus on improving ROM and strength in L knee. Attempted to complete seated knee ext and HS step S, but pt reports that she did not feel a stretch.  To attempt prone knee ext S next time in order to help improve knee ext which was lacking at evaluation. Worked with pt on mini squat form with pt demonstrating okay carryover for first few reps, but then transitions to poor technique. To f/u next visit to assess proper technique to ensure proper muscles are being targeted. Pt reported increase pain in L calf with SB S, so watch during next treatment, and if too painful, hold at that time. [x] Progressing toward goals. [] No change. [] Other:    [x] Patient would continue to benefit from skilled physical therapy services in order to: Improve L knee ROM and strength to improve gait mechanics and ease functional mobility. STG: (to be met in 6 treatments)  1. ? Pain: Decrease pain levels to 3/10 with all activities to ease ADLs. 2. ? ROM: Increase AROM of L knee to equal R to help improve gait mechanics and ease functional mobility. 3. Independent with Home Exercise Programs     LTG: (to be met in 12 treatments)  1. Improve score on KOOS, Jr from 29% impaired to <10% impaired to allow pt to return to recreational walking. 2. ? Strength: Increase L LE strength to 5/5 grossly to improve functional mobility, ease stairs, and improve safety by decreasing risk for falls. 3. Reduce pain levels to 0/10  4. Demonstrate Knowledge of fall prevention                    Patient goals: \"Walking, returning to normal routine\"    Pt. Education:  [x] Yes  [] No  [x] Reviewed Prior HEP/Ed  Method of Education: [x] Verbal  [x] Demo  [] Written  Comprehension of Education:  [x] Verbalizes understanding. [x] Demonstrates understanding. [x] Needs review. [] Demonstrates/verbalizes HEP/Ed previously given. Plan: [x] Continue per plan of care.    [] Other:      Treatment Charges: Mins Units   []  Modalities     [x]  Ther Exercise 32 2   []  Manual Therapy     []  Ther Activities     []  Aquatics     []  Neuromuscular     [x] Vasocompression     [] Gait Training     [] Dry needling        [] 1 or 2 muscles [] 3 or more muscles     []  Other     Total Treatment time 32 2     Time In: 0888           Time Out: 1004    Electronically signed by:   Chika Sanz PT

## 2020-08-17 ENCOUNTER — HOSPITAL ENCOUNTER (OUTPATIENT)
Dept: PHYSICAL THERAPY | Age: 80
Setting detail: THERAPIES SERIES
Discharge: HOME | End: 2020-08-17
Payer: MEDICARE

## 2020-08-17 NOTE — FLOWSHEET NOTE
[address]   Outpatient Physical Therapy  Cancel/No Show Note    Date: 20    Patient Name: Irene Meyer        MRN: [id number]  Account: [de-identified] : 1940      AC Barrett                                             Insurance: Medicare  Medical Diagnosis: Chronic pain of left knee, S/P revision of total knee, left                     Rehab Codes: M25.562, G89.29, Z96.652  Onset date: 20                           Next 's appt.: 20  Visit# / total visits:                Cancels/No Shows: 1/0    Patient canceled, family matters.      Drew Deng, PTA

## 2020-08-18 ENCOUNTER — HOSPITAL ENCOUNTER (OUTPATIENT)
Dept: PHYSICAL THERAPY | Age: 80
Setting detail: THERAPIES SERIES
Discharge: HOME | End: 2020-08-18
Payer: MEDICARE

## 2020-08-18 PROCEDURE — 97110 THERAPEUTIC EXERCISES: CPT

## 2020-08-18 NOTE — FLOWSHEET NOTE
509 [facility] Outpatient Physical Therapy   [address] [place] [address] [address]   Phone: [phone]   Fax: [phone]    Physical Therapy Daily Treatment Note      Date:  2020  Patient Name:  Danae Lopez    :  1940  MRN: [id number]  Physician: AC Perez                                             Insurance: Medicare  Medical Diagnosis: Chronic pain of left knee, S/P revision of total knee, left                     Rehab Codes: M25.562, G89.29, Z96.652  Onset date: 20                           Next Dr's appt.: 20  Visit# / total visits:   Cancels/No Shows: 0/0    Subjective:    Pt presents to PT stating spasms are not as bad but she is very tired from taking care of her  and taking him to doctor appointments all day. Pain:  [] Yes  [x] No  Location: L knee Pain Rating: (0-10 scale) denies/10  Pain altered Tx:  [x] No  [] Yes  Action:  Comments:    Objective:  Modalities: vasocompression L knee 34* 15' low pressure (Patient is tender to touch on (B) lower shins so proceed with caution when setting up)- HELD TODAY   Precautions:  Exercises:  Exercise  L Knee    Reps/ Time Weight/ Level Comments    Nustep  5'   x   HS step S          SB S 5x30\"     x   Seated knee flexion S 5x30\"     x   Seated knee ext S 2x30\"        Seated marches x10        LAQ 2x10, 2\"     x   Seated add squeeze 10x 5\"      Seated abd with TB 10x5\" Orange TB  x                     Step ups 10x2 6'   x   Standing HS curls  10x2         Standing mini squat  2x10     x               Prone knee ext 1x 3' 3#                                     Other:    Specific Instructions for next treatment: Try prone knee ext S      Assessment: Continued per treatment plan above. Continued with focus on improving ROM and strength in L knee. Able to complete knee extension stretch in prone this date with good tolerance, patient notes it \"felt good\" to stretch this date. [x] Progressing toward goals.     [] No change. [] Other:    [x] Patient would continue to benefit from skilled physical therapy services in order to: Improve L knee ROM and strength to improve gait mechanics and ease functional mobility. STG: (to be met in 6 treatments)  1. ? Pain: Decrease pain levels to 3/10 with all activities to ease ADLs. 2. ? ROM: Increase AROM of L knee to equal R to help improve gait mechanics and ease functional mobility. 3. Independent with Home Exercise Programs     LTG: (to be met in 12 treatments)  1. Improve score on KOOS, Jr from 29% impaired to <10% impaired to allow pt to return to recreational walking. 2. ? Strength: Increase L LE strength to 5/5 grossly to improve functional mobility, ease stairs, and improve safety by decreasing risk for falls. 3. Reduce pain levels to 0/10  4. Demonstrate Knowledge of fall prevention                    Patient goals: \"Walking, returning to normal routine\"    Pt. Education:  [x] Yes  [] No  [x] Reviewed Prior HEP/Ed  Method of Education: [x] Verbal  [x] Demo  [] Written  Comprehension of Education:  [x] Verbalizes understanding. [x] Demonstrates understanding. [x] Needs review. [] Demonstrates/verbalizes HEP/Ed previously given. Plan: [x] Continue per plan of care.    [] Other:      Treatment Charges: Mins Units   []  Modalities     [x]  Ther Exercise 39 3   []  Manual Therapy     []  Ther Activities     []  Aquatics     []  Neuromuscular     [x] Vasocompression     [] Gait Training     [] Dry needling        [] 1 or 2 muscles        [] 3 or more muscles     []  Other     Total Treatment time 39 3     Time In: 0400           Time Out: 450pm    Electronically signed by:  Trisha Macedo PTA

## 2020-08-21 ENCOUNTER — HOSPITAL ENCOUNTER (OUTPATIENT)
Dept: PHYSICAL THERAPY | Age: 80
Setting detail: THERAPIES SERIES
Discharge: HOME | End: 2020-08-21
Payer: MEDICARE

## 2020-08-21 PROCEDURE — 97110 THERAPEUTIC EXERCISES: CPT

## 2020-08-21 NOTE — FLOWSHEET NOTE
[facility] Outpatient Physical Therapy   [address]   Phone: [phone]   Fax: [phone]    Physical Therapy Daily Treatment Note      Date:  2020  Patient Name:  Roz Finley    :  1940  MRN: [id number]  Physician: AC Logan                                             Insurance: Medicare  Medical Diagnosis: Chronic pain of left knee, S/P revision of total knee, left                     Rehab Codes: M25.562, G89.29, Z96.652  Onset date: 20                           Next Dr's appt.: 20  Visit# / total visits:   Cancels/No Shows: 0/0    Subjective:    Pt states  fell at 3am this morning and it has been \"non stop\" all day. States she is fatigued from her day already. Pain:  [] Yes  [x] No  Location: L knee Pain Rating: (0-10 scale) denies/10  Pain altered Tx:  [x] No  [] Yes  Action:  Comments:    Objective:  Modalities: vasocompression L knee 34* 15' low pressure (Patient is tender to touch on (B) lower shins so proceed with caution when setting up)- HELD TODAY   Precautions:  Exercises:  Exercise  L Knee    Reps/ Time Weight/ Level Comments    Nustep  5'   x   HS step S          SB S 5x30\"     x   Seated knee flexion S 5x30\"     x   Seated knee ext S 2x30\"        Seated marches x10        LAQ 2x10, 2\"     x   Seated add squeeze 10x 5\"      Seated abd with TB 10x5\" Orange TB  x          3 way hip 10x [place]    x   Step ups 10x2 6'   x   Standing HS curls  10x2         Standing mini squat  2x10     x               Prone knee ext 1x 3' 3# x                                    Other:    Specific Instructions for next treatment: Try prone knee ext S      Assessment: Continued per treatment plan above. Continued with focus on improving ROM and strength in L knee. No increased pain this date able to add three way hip with resistance despite tenderness on anterior ankle. [x] Progressing toward goals. [] No change.      [] Other:    [x] Patient would continue to benefit from skilled physical therapy services in order to: Improve L knee ROM and strength to improve gait mechanics and ease functional mobility. STG: (to be met in 6 treatments)  1. ? Pain: Decrease pain levels to 3/10 with all activities to ease ADLs. 2. ? ROM: Increase AROM of L knee to equal R to help improve gait mechanics and ease functional mobility. 3. Independent with Home Exercise Programs     LTG: (to be met in 12 treatments)  1. Improve score on KOOS, Jr from 29% impaired to <10% impaired to allow pt to return to recreational walking. 2. ? Strength: Increase L LE strength to 5/5 grossly to improve functional mobility, ease stairs, and improve safety by decreasing risk for falls. 3. Reduce pain levels to 0/10  4. Demonstrate Knowledge of fall prevention                    Patient goals: \"Walking, returning to normal routine\"    Pt. Education:  [x] Yes  [] No  [x] Reviewed Prior HEP/Ed  Method of Education: [x] Verbal  [x] Demo  [] Written  Comprehension of Education:  [x] Verbalizes understanding. [x] Demonstrates understanding. [x] Needs review. [] Demonstrates/verbalizes HEP/Ed previously given. Plan: [x] Continue per plan of care.    [] Other:      Treatment Charges: Mins Units   []  Modalities     [x]  Ther Exercise 39 3   []  Manual Therapy     []  Ther Activities     []  Aquatics     []  Neuromuscular     [x] Vasocompression     [] Gait Training     [] Dry needling        [] 1 or 2 muscles        [] 3 or more muscles     []  Other     Total Treatment time 39 3     Time In: 1100am           Time Out: 1145am    Electronically signed by:  Jessie Springer PTA

## 2020-08-24 ENCOUNTER — HOSPITAL ENCOUNTER (OUTPATIENT)
Dept: PHYSICAL THERAPY | Age: 80
Setting detail: THERAPIES SERIES
Discharge: HOME | End: 2020-08-24
Payer: MEDICARE

## 2020-08-24 PROCEDURE — 97110 THERAPEUTIC EXERCISES: CPT

## 2020-08-24 NOTE — PROGRESS NOTES
[address] Outpatient Physical Therapy   [address]   Phone: [phone]   Fax: [phone]    Physical Therapy Daily Treatment Note/ Progress Report      Date:  2020  Patient Name:  Evelio Montiel    :  1940  MRN: [id number]  Physician: AC Irizarry                                             Insurance: Medicare  Medical Diagnosis: Chronic pain of left knee, S/P revision of total knee, left                     Rehab Codes: M25.562, G89.29, Z96.652  Onset date: 20                           Next 's appt.: 20  Visit# / total visits:   Cancels/No Shows: 0/0    Subjective:    Pt reports to PT with increased pain/swelling in her L ankle, stating that she is unsure what happened, it just started hurting and the swelling has gotten worse since she got up this morning. Pt still denies any pain or changes in her L knee.   Pain:  [x] Yes  [] No  Location: L ankle Pain Rating: (0-10 scale) 5/10  Pain altered Tx:  [x] No  [] Yes  Action:  Comments:    Objective:  Modalities: vasocompression L knee 34* 15' low pressure (Patient is tender to touch on (B) lower shins so proceed with caution when setting up)- HELD TODAY   Precautions:  Exercises:  Exercise  L Knee    Reps/ Time Weight/ Level Comments    Nustep  8'   x   HS step S          SB S 5x30\"     x   Seated knee flexion S 5x30\"     x   Seated knee ext S 5x30\"     x   Seated marches x10        LAQ 2x12, 2\" 2#    x   Seated add squeeze 10x 5\"      Seated abd with TB 10x5\" Orange TB            3 way hip 10x [place]       Step ups 10x2 8'   x   Standing HS curls  12x2  2#    x   Standing mini squat  2x12     x               Prone knee ext 1x 3' 3#                                     Other:     ROM DEGREES A/P ROM DEGREES A/P STRENGTH  STRENGTH     LEFT  RIGHT LEFT RIGHT   HIP FLEX       HIPEXT       HIP ER       HIP IR       HIP ABD       HIP ADD              KNEE FLEX 114  4/5    KNEE EXT 7* from 0  4-/5 ANKLE DF                    PF                    INV                    EVER       - Assessed knee ROM in sitting d/t pain in supine    Specific Instructions for next treatment: Continue tx per POC      Assessment: Continued per exercise log above to keep working on improving L knee strength and ROM. Increased height of step ups and added weight to LAQ and HS curls with good tolerance. Pt demonstrates improvements in both her functional strength and ROM when assessed today, presenting almost equal to her R knee. Plan to continue with current POC to work on improving ROM and strength to equal her R knee or to improve to normal knee ROM/strength measurements to improve gait mechanics and functional mobility. [x] Progressing toward goals. [] No change. [] Other:    [x] Patient would continue to benefit from skilled physical therapy services in order to: Improve L knee ROM and strength to improve gait mechanics and ease functional mobility. STG: (to be met in 6 treatments)  1. ? Pain: Decrease pain levels to 3/10 with all activities to ease ADLs. : ONGOING, still up to 3-4/10 in late evening (8/24/20)  2. ? ROM: Increase AROM of L knee to equal R to help improve gait mechanics and ease functional mobility.: ONGOING, pt with improvements since day 1, still with some deficits compared to R knee (8/24/20)  3. Independent with Home Exercise Programs: MET (8/24/20)     LTG: (to be met in 12 treatments)  1. Improve score on KOOS, Jr from 29% impaired to <10% impaired to allow pt to return to recreational walking. 2. ? Strength: Increase L LE strength to 5/5 grossly to improve functional mobility, ease stairs, and improve safety by decreasing risk for falls. 3. Reduce pain levels to 0/10  4. Demonstrate Knowledge of fall prevention                    Patient goals: \"Walking, returning to normal routine\"    Pt.  Education:  [x] Yes  [] No  [x] Reviewed Prior HEP/Ed  Method of Education: [x] Verbal  [x] Demo  [] Written  Comprehension of Education:  [x] Verbalizes understanding. [x] Demonstrates understanding. [x] Needs review. [] Demonstrates/verbalizes HEP/Ed previously given. Plan: [x] Continue per plan of care. [] Other:      Treatment Charges: Mins Units   []  Modalities     [x]  Ther Exercise 41 3   []  Manual Therapy     []  Ther Activities     []  Aquatics     []  Neuromuscular     [] Vasocompression     [] Gait Training     [] Dry needling        [] 1 or 2 muscles        [] 3 or more muscles     []  Other     Total Treatment time 41 3     Time In: 3484           Time Out: 2076    Electronically signed by:   Rola Barragan PT

## 2020-08-26 ENCOUNTER — OFFICE VISIT (OUTPATIENT)
Dept: ORTHOPEDIC SURGERY | Age: 80
End: 2020-08-26

## 2020-08-26 VITALS — TEMPERATURE: 97.3 F

## 2020-08-26 PROCEDURE — 99024 POSTOP FOLLOW-UP VISIT: CPT | Performed by: PHYSICIAN ASSISTANT

## 2020-08-26 ASSESSMENT — ENCOUNTER SYMPTOMS
EYES NEGATIVE: 1
COUGH: 0
RHINORRHEA: 0
COLOR CHANGE: 0
NAUSEA: 0
VOMITING: 0

## 2020-08-26 NOTE — PROGRESS NOTES
Patient ID: Evelio Montiel is a 79 y.o. female. Chief Complaint   Patient presents with    Knee Pain     s/p left knee revision        HPI  Ms. Demarcus Michaud is a 28-year-old female who is status post left total knee revision with poly-exchange performed on 7/14/2020. Patient states she is doing very well postoperatively. She is currently in physical therapy seems to be progressing well strength wise. She states that range of motion continues to improve. She does continue to have lower extremity swelling which is causing some limitation in the amount she can walk however she states she has had this problem even before this surgery seems to be stemming from the ankle which she is currently being evaluated by her PCP for. She denies any knee joint warmth, redness, fever or chills. She states her incision has healed well at this time. Past Medical History:   Diagnosis Date    Age-related cognitive decline     Ankle pain     Left ankle fracture in ortho boat.    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    left with urinary incontinenc post surgical    Caffeine use     2 coffee/day, 1-2 tea per week    Chronic back pain     Chronic ITP (idiopathic thrombocytopenia) (HCC)     CVA (cerebral infarction) 2008, 2010    balance issues, short term memory loss    Diabetic gastroparesis ([address]     Diverticular disease 1986    GERD (gastroesophageal reflux disease)     Hiatal hernia     Hip fracture ([address]     History of blood transfusion     History of decreased platelet count     Hyperlipemia     Hypertension     Internal hemorrhoid     Macular degeneration of left eye 1980's    S/P laser treatment    Nausea and vomiting     Neuropathy     Osteoarthritis     Ringing in ears     Self-catheterizes urinary bladder     6-7 times daily    TIA (transient ischemic attack) 2000    x1    Tubular adenoma     colon polyps    Type II or unspecified type diabetes mellitus without mention of complication, not stated as uncontrolled     Urinary incontinence     frequent UTI s/p infection, surgical dilatation lead to incontinence    Wears dentures     full on top, partial on bottom    Wears glasses      Past Surgical History:   Procedure Laterality Date    APPENDECTOMY  1962    BLADDER SURGERY      bladder stimulator    BLADDER SUSPENSION  2002    multiple    CARDIAC CATHETERIZATION  2008    no stents    CARDIOVASCULAR STRESS TEST  12/2014    CATARACT REMOVAL WITH IMPLANT Left 11/07/2017    [facility]    CATARACT REMOVAL WITH IMPLANT Right 11/28/2017    [facility]    COLON SURGERY      colostomy reversal    COLONOSCOPY  4/7/16    tubular adenoma x3; internal hemorrhoids    COLONOSCOPY N/A 11/6/2019    COLONOSCOPY DIAGNOSTIC performed by Ward Bernstein MD at [address] [place] [address]    bowel obstruction/ rupture from diverticular disease, reversal 3 mos later    CYSTOSCOPY  09/08/2017    W/ 200IU Botox     FRACTURE SURGERY Right 2011    hip    FRACTURE SURGERY Left 2001    [facility]ST    FRACTURE SURGERY Left 2013    ankle    HERNIA REPAIR      HYSTERECTOMY  1969    JOINT REPLACEMENT Bilateral 2000    BILAT KNEES    LUMBAR FUSION  2017    L2/L3    MA XCAPSL CTRC RMVL INSJ IO LENS PROSTH W/O ECP Left 11/7/2017    EYE CATARACT EMULSIFICATION IOL IMPLANT performed by Geovanny Pinto MD at [facility] W/O ECP Right 11/28/2017    EYE CATARACT EMULSIFICATION IOL IMPLANT performed by Geovanny Pinto MD at [facility] Right 10/27/15    WITH POLY EXCHANGE BIOMET AND GPS APPLICATION    REVISION TOTAL KNEE ARTHROPLASTY Left 7/14/2020    KNEE TOTAL ARTHROPLASTY REVISION - POLY EXCHANGE performed by Kevin Zaragoza MD at [address] [facility]  2010    fusion, did not take   [address]    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    NEAL AND BSO      TONSILLECTOMY  1978    UPPER GASTROINTESTINAL ENDOSCOPY      UPPER GASTROINTESTINAL ENDOSCOPY  14    UPPER GASTROINTESTINAL ENDOSCOPY  2016    mild gastritis    UPPER GASTROINTESTINAL ENDOSCOPY N/A 2018    retained thick secretions in proximal esophagus     Family History   Problem Relation Age of Onset    Breast Cancer Mother     Cancer Mother         breast and uterine  39    Heart Disease Father     Heart Attack Father          28   [address] Maternal Grandmother     Cancer Brother 46        bronchogenic adenocarcinoma cancer    Lung Cancer Brother     Cancer Sister         lung    Lung Cancer Sister          72    Breast Cancer Sister          62    Cancer Sister         breast     Social History     Occupational History    Occupation: retired   Tobacco Use    Smoking status: Former Smoker     Packs/day: 0.50     Years: 20.00     Pack years: 10.00     Last attempt to quit: 1982     Years since quittin.2    Smokeless tobacco: Former User     Quit date: 1982   Substance and Sexual Activity    Alcohol use: No    Drug use: No    Sexual activity: Not on file        Review of Systems   Constitutional: Negative for chills and fever. HENT: Negative for congestion and rhinorrhea. Eyes: Negative. Respiratory: Negative for cough. Cardiovascular: Negative for chest pain and leg swelling. Gastrointestinal: Negative for nausea and vomiting. Musculoskeletal:        S/p poly exchange left TKA   Skin: Negative for color change and rash. Neurological: Negative for dizziness and numbness. Physical Exam  Constitutional:       Appearance: She is well-developed. HENT:      Head: Normocephalic and atraumatic. Neck:      Musculoskeletal: Normal range of motion and neck supple. Cardiovascular:      Rate and Rhythm: Normal rate.    Pulmonary:      Effort: Pulmonary effort is normal. Abdominal:      Palpations: Abdomen is soft. Musculoskeletal:      Comments: Gait:  Normal.  Sutures:   Sutures out. Incision:  no significant drainage, no dehiscence, no significant erythema, pretty much well-healed at this point. Tenderness:  none  Flexion ROM:  115  Extension ROM:  2  Effusion:  mild  DVT Evaluation:  No evidence of DVT seen on physical exam.   Skin:     General: Skin is warm and dry. Findings: No rash. Neurological:      Mental Status: She is alert and oriented to person, place, and time. Psychiatric:         Behavior: Behavior normal.         Assessment:     Encounter Diagnosis   Name Primary?  S/P revision of total knee, left Yes         No new x-rays are taken in clinic today    Plan:     Ms. Charity Funk is a 69-year-old female who is status post poly-change left total knee is doing well postoperatively. Patient is requesting continued physical therapy to optimize her strength and efficiency with ADLs. A new referral was placed for PT to be continued. -From an orthopedic standpoint patient is doing excellent status post poly-exchange she has very minimal issues with pain and has excellent range of motion. We will see her back on her 1 year anniversary of this surgery. Orders Placed This Encounter   Procedures   [address]     Referral Priority:   Routine     Referral Type:   Eval and Treat     Referral Reason:   Specialty Services Required     Requested Specialty:   Physical Therapy     Number of Visits Requested:   [address] [place], [place]    Please note that this chart was generated using voicerecognition Dragon dictation software. Although every effort was made to ensurethe accuracy of this automated transcription, some errors in transcription may haveoccurred.

## 2020-08-27 ENCOUNTER — HOSPITAL ENCOUNTER (OUTPATIENT)
Dept: PHYSICAL THERAPY | Age: 80
Setting detail: THERAPIES SERIES
Discharge: HOME | End: 2020-08-27
Payer: MEDICARE

## 2020-08-27 PROCEDURE — 97110 THERAPEUTIC EXERCISES: CPT

## 2020-08-27 PROCEDURE — 97016 VASOPNEUMATIC DEVICE THERAPY: CPT

## 2020-08-31 ENCOUNTER — HOSPITAL ENCOUNTER (OUTPATIENT)
Dept: PHYSICAL THERAPY | Age: 80
Setting detail: THERAPIES SERIES
Discharge: HOME | End: 2020-08-31
Payer: MEDICARE

## 2020-08-31 NOTE — FLOWSHEET NOTE
[address]   Outpatient Physical Therapy  Cancel/No Show Note    Date: 20    Patient Name: Paulo Obrien        MRN: [id number]  Account: [de-identified] : 1940    AC [place]                                             Insurance: Medicare  Medical Diagnosis: Chronic pain of left knee, S/P revision of total knee, left                     Rehab Codes: M25.562, G89.29, Z96.652  Onset date: 20                           Next 's appt.: 20  Visit# / total visits:                Cancels/No Shows: 2/0    Patient canceled due to family matters.      Tata Weber, AARON

## 2020-09-03 ENCOUNTER — HOSPITAL ENCOUNTER (OUTPATIENT)
Dept: PHYSICAL THERAPY | Age: 80
Setting detail: THERAPIES SERIES
Discharge: HOME | End: 2020-09-03
Payer: MEDICARE

## 2020-09-03 PROCEDURE — 97110 THERAPEUTIC EXERCISES: CPT

## 2020-09-03 NOTE — FLOWSHEET NOTE
[facility]   [address]   Phone: [phone]   Fax: [phone]    Physical Therapy Daily Treatment Note      Date:  9/3/2020  Patient Name:  Valerie Lepe    :  1940  MRN: [id number]  Physician: AC Bennett                                             Insurance: Medicare  Medical Diagnosis: Chronic pain of left knee, S/P revision of total knee, left                     Rehab Codes: M25.562, G89.29, Z96.652  Onset date: 20                           Next 's appt.: 20  Visit# / total visits:   Cancels/No Shows: 2/0    Subjective:    Pt states she has been in a lot of pain mostly in L ankle and R hip, states she feels she can't do much in therapy for her knee because of her other problems with neuropathy and pain. States she may get a electro stim implant to help with the pain and States she will be getting cat scans done in the next couple of days and will talk to her doctor to see if she should continue therapy at this time.    Pain:  [] Yes  [x] No  Location: -- Pain Rating: (0-10 scale) 0/10  Pain altered Tx:  [x] No  [] Yes  Action:  Comments:    Objective:  Modalities: vasocompression L knee 34* 15' low pressure (Patient is tender to touch on (B) lower shins so proceed with caution when setting up)- held 9/3/20  Precautions:  Exercises:  Exercise  L Knee    Reps/ Time Weight/ Level Comments    Nustep  5'   x   HS step S          SB S 5x30\"     x   Seated knee flexion S 5x30\"     x   Seated knee ext S 5x30\"     x   Seated marches 3x10 3#    x   LAQ 3x10, 2\" 3#    x   Seated add squeeze 10x 5\"      Seated abd with TB 10x5\" Orange TB            3 way hip 10x Rhea       Step ups 10x2 8'      Standing HS curls  3x10  3#    x   Standing mini squat  2x12                    Prone knee ext 1x 3' 3#                                     Other:    Specific Instructions for next treatment: Continue tx per POC      Assessment: unable to progress patient this date secondary to co-morbities and pain. Demonstrated to patient multiple ways to stretch hamstring and calf this date but unsuccessful due to intolerance of pressure on ankle caused by stretches. Encouraged patient to talk to her doctor about pain symptoms and limitations. Held cold compression this date per patient request due to reports of it aggravating neuropathy symptoms last sessions. [x] Progressing toward goals. [] No change. [] Other:    [x] Patient would continue to benefit from skilled physical therapy services in order to: Improve L knee ROM and strength to improve gait mechanics and ease functional mobility. STG: (to be met in 6 treatments)  1. ? Pain: Decrease pain levels to 3/10 with all activities to ease ADLs. : ONGOING, still up to 3-4/10 in late evening (8/24/20)  2. ? ROM: Increase AROM of L knee to equal R to help improve gait mechanics and ease functional mobility.: ONGOING, pt with improvements since day 1, still with some deficits compared to R knee (8/24/20)  3. Independent with Home Exercise Programs: MET (8/24/20)     LTG: (to be met in 12 treatments)  1. Improve score on KOOS, Jr from 29% impaired to <10% impaired to allow pt to return to recreational walking. 2. ? Strength: Increase L LE strength to 5/5 grossly to improve functional mobility, ease stairs, and improve safety by decreasing risk for falls. 3. Reduce pain levels to 0/10  4. Demonstrate Knowledge of fall prevention                    Patient goals: \"Walking, returning to normal routine\"    Pt. Education:  [x] Yes  [] No  [x] Reviewed Prior HEP/Ed  Method of Education: [x] Verbal  [x] Demo  [] Written  Comprehension of Education:  [x] Verbalizes understanding. [x] Demonstrates understanding. [x] Needs review. [] Demonstrates/verbalizes HEP/Ed previously given. Plan: [x] Continue per plan of care.    [] Other:      Treatment Charges: Mins Units   []  Modalities     [x]  Ther Exercise 35 2   []  Manual Therapy     []  Ther Activities     []  Aquatics     []  Neuromuscular     [x] Vasocompression     [] Gait Training     [] Dry needling        [] 1 or 2 muscles        [] 3 or more muscles     []  Other     Total Treatment time 35 2     Time In: 2994           Time Out: 1000    Electronically signed by:  Emilia Cunha PTA

## 2020-09-04 ENCOUNTER — OFFICE VISIT (OUTPATIENT)
Dept: PODIATRY | Age: 80
End: 2020-09-04
Payer: MEDICARE

## 2020-09-04 VITALS — HEIGHT: 59 IN | WEIGHT: 160 LBS | BODY MASS INDEX: 32.25 KG/M2

## 2020-09-04 PROCEDURE — 11721 DEBRIDE NAIL 6 OR MORE: CPT | Performed by: PODIATRIST

## 2020-09-04 PROCEDURE — 99999 PR OFFICE/OUTPT VISIT,PROCEDURE ONLY: CPT | Performed by: PODIATRIST

## 2020-09-04 ASSESSMENT — ENCOUNTER SYMPTOMS
DIARRHEA: 0
BACK PAIN: 0
NAUSEA: 0
SHORTNESS OF BREATH: 0
COLOR CHANGE: 0

## 2020-09-04 NOTE — PROGRESS NOTES
SUBJECTIVE: Caleb Weaver is a 79 y.o. female who returns to the office with chief complaint of painful fungal toenails. Patient relates toe nails are thickened/difficult to trim as well as painful with ambulation and with shoe gear. Chief Complaint   Patient presents with    Nail Problem     NAIL TRIM/LAST SAW DR. ANA ROSA DUMONT  7/2/2020    Diabetes     blood sugar 128     Review of Systems   Constitutional: Negative for activity change, appetite change, chills, diaphoresis, fatigue and fever. Respiratory: Negative for shortness of breath. Cardiovascular: Negative for leg swelling. Gastrointestinal: Negative for diarrhea and nausea. Endocrine: Negative for cold intolerance, heat intolerance and polyuria. Musculoskeletal: Positive for arthralgias. Negative for back pain, gait problem, joint swelling and myalgias. Skin: Negative for color change, pallor, rash and wound. Allergic/Immunologic: Negative for environmental allergies and food allergies. Neurological: Negative for dizziness, weakness, light-headedness and numbness. Hematological: Does not bruise/bleed easily. Psychiatric/Behavioral: Negative for behavioral problems, confusion and self-injury. The patient is not nervous/anxious. OBJECTIVE: Clinical evaluation of patient reveals nails 1,2,3,4,5 of the right foot and nails 1,2,3,4,5, of the left foot to present with thickness, elongation, discoloration, brittleness, and subungual debris. There was pain with palpation and debridement of the toenails of the bilateral feet. No open lesions noted to either foot today. The right DP pulse is palpable. The left DP pulse is not palpable. The right PT pulse is palpable. The left PT pulse is not palpable. Protective sensation is present to the right plantar foot as noted with a 5.07 [place]an monofilament. Protective sensation is present to the left plantar foot as noted with a 5.07 [place]Dean monofilament.    Glucose:

## 2020-10-05 ENCOUNTER — HOSPITAL ENCOUNTER (OUTPATIENT)
Dept: PHYSICAL THERAPY | Age: 80
Setting detail: THERAPIES SERIES
Discharge: HOME | End: 2020-10-05
Payer: MEDICARE

## 2020-10-05 NOTE — DISCHARGE SUMMARY
[] [facility]) @ [facility]  [address]  [place], [address] [facility]  Phone [phone]  Fax [phone]    Physical Therapy Discharge Note    Date: 10/5/2020      Patient: Kait Quintana  : 1940  MRN: [id number]    [address] [place]                                               Medical Diagnosis: Chronic pain of left knee, S/P revision of total knee, left                     Rehab Codes: M25.562, G89.29, Z96.652  Onset date: 20                             Visit# / total visits:                  Cancels/No Shows: 2/0  Date of initial visit: 20                   [] Patient recovered from conditions. Treatment goals were met. [] Patient received maximum benefit. No further therapy indicated at this time. [] Patient demonstrated improvement from condition with  ** Of  ** Short term goals met. []Patient demonstrated improvement from condition with **   Of **  Long term goals met. [] Patient to continue exercise/home instructions independently. [] Therapy interrupted due to:    [] Patient has 2 or more no shows/cancels, is discontinued per our policy. [] Patient has completed prescribed number of treatment sessions. [x] Other: Pt had no called back after last doctor's appointment to schedule more therapy as discussed per last PT note      Pain level at evaluation was       0/10 and at discharge was       Unknown/10    It Is My Understanding That The:  [] Patient returned to work. [x] Patient demonstrated improved level of function. [] Patient returned to previous functional level.   [] Patient's current functional status is unknown due to no-shows  [] Other:     Recommendations/Comments:                   Treatment Included:     [x] Therapeutic Exercise   85104  [] Iontophoresis: 4 mg/mL Dexamethasone Sodium Phosphate  mAmin  77920   [] Therapeutic Activity  00445 [] Vasopneumatic cold with compression  63934    [] Gait Training   27044 [] Ultrasound   [id number]   [] Neuromuscular Re-education  [id number] [] Electrical Stimulation Unattended  96224   [] Manual Therapy  57508 [] Electrical Stimulation Attended  [id number]   [x] Instruction in HEP  [] Lumbar/Cervical Traction  [id number]   [] Aquatic Therapy   [id number] [] Cold/hotpack    [] Massage   88035      [] Dry Needling, 1 or 2 muscles  83702   [] Biofeedback, first 15 minutes   85983  [] Biofeedback, additional 15 minutes   94611 [] Dry Needling, 3 or more muscles  81491                If you have any questions or concerns regarding this patient's care, please contact us. Thank you for your referral.      Electronically signed by:  Kandis Duran PT

## 2020-10-12 ENCOUNTER — NURSE ONLY (OUTPATIENT)
Dept: PODIATRY | Age: 80
End: 2020-10-12

## 2020-11-23 ENCOUNTER — OFFICE VISIT (OUTPATIENT)
Dept: PRIMARY CARE CLINIC | Age: 80
End: 2020-11-23
Payer: MEDICARE

## 2020-11-23 ENCOUNTER — NURSE TRIAGE (OUTPATIENT)
Dept: OTHER | Facility: CLINIC | Age: 80
End: 2020-11-23

## 2020-11-23 ENCOUNTER — HOSPITAL ENCOUNTER (OUTPATIENT)
Age: 80
Setting detail: SPECIMEN
Discharge: HOME | End: 2020-11-23
Payer: MEDICARE

## 2020-11-23 ENCOUNTER — HOSPITAL ENCOUNTER (OUTPATIENT)
Age: 80
Discharge: HOME | End: 2020-11-25
Payer: MEDICARE

## 2020-11-23 ENCOUNTER — HOSPITAL ENCOUNTER (OUTPATIENT)
Dept: GENERAL RADIOLOGY | Age: 80
Discharge: HOME | End: 2020-11-25
Payer: MEDICARE

## 2020-11-23 VITALS
DIASTOLIC BLOOD PRESSURE: 82 MMHG | HEART RATE: 123 BPM | OXYGEN SATURATION: 95 % | TEMPERATURE: 97.3 F | SYSTOLIC BLOOD PRESSURE: 136 MMHG

## 2020-11-23 PROBLEM — E11.51 TYPE 2 DIABETES MELLITUS WITH DIABETIC PERIPHERAL ANGIOPATHY WITHOUT GANGRENE, WITHOUT LONG-TERM CURRENT USE OF INSULIN (HCC): Status: ACTIVE | Noted: 2020-11-23

## 2020-11-23 PROBLEM — I27.20 MILD PULMONARY HYPERTENSION (HCC): Status: ACTIVE | Noted: 2020-11-23

## 2020-11-23 PROCEDURE — G8400 PT W/DXA NO RESULTS DOC: HCPCS | Performed by: PHYSICIAN ASSISTANT

## 2020-11-23 PROCEDURE — 4040F PNEUMOC VAC/ADMIN/RCVD: CPT | Performed by: PHYSICIAN ASSISTANT

## 2020-11-23 PROCEDURE — G8427 DOCREV CUR MEDS BY ELIG CLIN: HCPCS | Performed by: PHYSICIAN ASSISTANT

## 2020-11-23 PROCEDURE — 99214 OFFICE O/P EST MOD 30 MIN: CPT | Performed by: PHYSICIAN ASSISTANT

## 2020-11-23 PROCEDURE — 1123F ACP DISCUSS/DSCN MKR DOCD: CPT | Performed by: PHYSICIAN ASSISTANT

## 2020-11-23 PROCEDURE — 1036F TOBACCO NON-USER: CPT | Performed by: PHYSICIAN ASSISTANT

## 2020-11-23 PROCEDURE — 71046 X-RAY EXAM CHEST 2 VIEWS: CPT

## 2020-11-23 PROCEDURE — 1090F PRES/ABSN URINE INCON ASSESS: CPT | Performed by: PHYSICIAN ASSISTANT

## 2020-11-23 PROCEDURE — G8417 CALC BMI ABV UP PARAM F/U: HCPCS | Performed by: PHYSICIAN ASSISTANT

## 2020-11-23 PROCEDURE — G8484 FLU IMMUNIZE NO ADMIN: HCPCS | Performed by: PHYSICIAN ASSISTANT

## 2020-11-23 RX ORDER — PREDNISONE 20 MG/1
20 TABLET ORAL 2 TIMES DAILY
Qty: 10 TABLET | Refills: 0 | Status: SHIPPED | OUTPATIENT
Start: 2020-11-23 | End: 2020-11-28

## 2020-11-23 ASSESSMENT — ENCOUNTER SYMPTOMS
CONSTIPATION: 0
EYE DISCHARGE: 0
CHEST TIGHTNESS: 0
SINUS PRESSURE: 0
ABDOMINAL PAIN: 0
PHOTOPHOBIA: 0
WHEEZING: 1
COUGH: 1
RHINORRHEA: 0
SHORTNESS OF BREATH: 1
ABDOMINAL DISTENTION: 0
VOMITING: 0
SORE THROAT: 0
DIARRHEA: 0

## 2020-11-23 NOTE — PROGRESS NOTES
[address] [place] [address] PRIMARY CARE  [id number] [id number]  Dept: [id number] Ricardo Medellin is a 79 y.o. female who presents today for her medical conditions/complaintsas noted below. Chief Complaint   Patient presents with    Cough     started this morning at 2am    Chest Congestion     Pt c/o chest congestion/heaviness        HPI:     HPI: The patient has had cough since 2am this moring. She took robitussin which helped. No fevers. She has inhalers but has not started them yet. She has not used it yet. She got this about a year ago. Back in October. She is not concerned for COVID. No other symptoms. She is having hacking cough. LDL Cholesterol (mg/dL)   Date Value   07/06/2020 85   10/17/2018 137 (H)   07/16/2018 64     LDL Calculated (mg/dL)   Date Value   03/12/2015 47   08/18/2014 39       (goal LDL is <100)   AST (U/L)   Date Value   08/09/2019 18     ALT (U/L)   Date Value   08/09/2019 14     BUN (mg/dL)   Date Value   06/30/2020 20     BP Readings from Last 3 Encounters:   11/23/20 136/82   10/05/20 122/68   07/14/20 (!) 142/68          (goal 120/80)    Past Medical History:   Diagnosis Date    Age-related cognitive decline     Ankle pain     Left ankle fracture in ortho boat.    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    left with urinary incontinenc post surgical    Caffeine use     2 coffee/day, 1-2 tea per week    Chronic back pain     Chronic ITP (idiopathic thrombocytopenia) (HCC)     CVA (cerebral infarction) 2008, 2010    balance issues, short term memory loss    Diabetic gastroparesis ([address]     Diverticular disease 1986    GERD (gastroesophageal reflux disease)     Hiatal hernia     Hip fracture (HCC)     History of blood transfusion     History of decreased platelet count     Hyperlipemia     Hypertension     Internal hemorrhoid     Macular degeneration of left eye 1980's    S/P laser treatment    Nausea and vomiting     Neuropathy     Osteoarthritis     Ringing in ears     Self-catheterizes urinary bladder     6-7 times daily    TIA (transient ischemic attack) 2000    x1    Tubular adenoma     colon polyps    Type II or unspecified type diabetes mellitus without mention of complication, not stated as uncontrolled     Urinary incontinence     frequent UTI s/p infection, surgical dilatation lead to incontinence    Wears dentures     full on top, partial on bottom    Wears glasses       Past Surgical History:   Procedure Laterality Date    APPENDECTOMY  1962    BLADDER SURGERY      bladder stimulator    BLADDER SUSPENSION  2002    multiple    CARDIAC CATHETERIZATION  2008    no stents    CARDIOVASCULAR STRESS TEST  12/2014    CATARACT REMOVAL WITH IMPLANT Left 11/07/2017    [facility]    CATARACT REMOVAL WITH IMPLANT Right 11/28/2017    [facility]    COLON SURGERY      colostomy reversal    COLONOSCOPY  4/7/16    tubular adenoma x3; internal hemorrhoids    COLONOSCOPY N/A 11/6/2019    COLONOSCOPY DIAGNOSTIC performed by Rosmery Cruz MD at [address] [place] [address]    bowel obstruction/ rupture from diverticular disease, reversal 3 mos later    CYSTOSCOPY  09/08/2017    W/ 200IU Botox     FRACTURE SURGERY Right 2011    hip    FRACTURE SURGERY Left 2001    WRIST    FRACTURE SURGERY Left 2013    ankle    HERNIA REPAIR      HYSTERECTOMY  1969    JOINT REPLACEMENT Bilateral 2000    BILAT KNEES    LUMBAR FUSION  2017    L2/L3    MI XCAPSL CTRC RMVL INSJ IO LENS PROSTH W/O ECP Left 11/7/2017    EYE CATARACT EMULSIFICATION IOL IMPLANT performed by Maddison Das MD at [facility] RMVL INSJ IO LENS PROSTH W/O ECP Right 11/28/2017    EYE CATARACT EMULSIFICATION IOL IMPLANT performed by Maddison Das MD at [facility] Right 10/27/15    WITH POLY EXCHANGE BIOMET AND GPS APPLICATION    REVISION TOTAL KNEE ARTHROPLASTY Left 2020    KNEE TOTAL ARTHROPLASTY REVISION - POLY EXCHANGE performed by Naima Ozuna MD at [address]      fusion, did not take   [address]    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    NEAL AND BSO      TONSILLECTOMY      UPPER GASTROINTESTINAL ENDOSCOPY      UPPER GASTROINTESTINAL ENDOSCOPY  14    UPPER GASTROINTESTINAL ENDOSCOPY  2016    mild gastritis    UPPER GASTROINTESTINAL ENDOSCOPY N/A 2018    retained thick secretions in proximal esophagus       Family History   Problem Relation Age of Onset    Breast Cancer Mother     Cancer Mother         breast and uterine  39    Heart Disease Father     Heart Attack Father          28   [address] [facility] Maternal Grandmother     Cancer Brother 46        bronchogenic adenocarcinoma cancer    Lung Cancer Brother     Cancer Sister         lung    Lung Cancer Sister          72    Breast Cancer Sister          62    Cancer Sister         breast       Social History     Tobacco Use    Smoking status: Former Smoker     Packs/day: 0.50     Years: 20.00     Pack years: 10.00     Last attempt to quit: 1982     Years since quittin.5    Smokeless tobacco: Former User     Quit date: 1982   Substance Use Topics    Alcohol use: No      Current Outpatient Medications   Medication Sig Dispense Refill    predniSONE (DELTASONE) 20 MG tablet Take 1 tablet by mouth 2 times daily for 5 days 10 tablet 0    tiZANidine (ZANAFLEX) 4 MG tablet Take 1 tablet by mouth 4 times daily as needed (Muscle spasms) 40 tablet 0    irbesartan (AVAPRO) 75 MG tablet Take 1 tablet by mouth daily 30 tablet 3    nystatin-triamcinolone (MYCOLOG II) 448764-5.1 UNIT/GM-% cream Apply topically 4 times daily Apply topically 4 times daily.  30 g 3    rOPINIRole (REQUIP) 1 MG tablet Take by mouth nightly       glipiZIDE [Povidone Iodine] Hives    Cephalexin Hives and Swelling    Cephalexin Itching     Other reaction(s): Hallucinations  In 1969 received demerol and keflex together so was told to list both as allergies    Cozaar [Losartan] Nausea Only     Pt also gets sores on toungue    Cymbalta [Duloxetine Hcl] Diarrhea and Nausea And Vomiting     Weakness    Demerol Hives and Swelling    Doxycycline Other (See Comments)     Sore mouth        Meperidine Itching     Other reaction(s): Hallucinations      Morphine Hives and Itching    Pcn [Penicillins] Hives, Swelling and Itching     Other reaction(s): Intolerance-unknown    Zithromax [Azithromycin] Other (See Comments)     Sore mouth      Clindamycin/Lincomycin     Etodolac     Fluorescein     Iodine      Other reaction(s): Intolerance-unknown    Lisinopril      cough    Penicillin G     Soap     Toviaz [Fesoterodine Fumarate Er]     Metformin And Related Nausea And Vomiting     Diarrhea and N/V       Health Maintenance   Topic Date Due    Shingles Vaccine (2 of 2) 01/22/2019    Annual Wellness Visit (AWV)  06/19/2019    DTaP/Tdap/Td vaccine (1 - Tdap) 01/09/2022 (Originally 8/12/1959)    Potassium monitoring  06/30/2021    Creatinine monitoring  06/30/2021    Lipid screen  07/06/2021    Flu vaccine  Completed    Pneumococcal 65+ years Vaccine  Completed    DEXA (modify frequency per FRAX score)  Addressed    Hepatitis A vaccine  Aged Out    Hib vaccine  Aged Out    Meningococcal (ACWY) vaccine  Aged Out       Subjective:      Review of Systems   Constitutional: Negative for chills, fever and unexpected weight change. HENT: Negative for congestion, hearing loss, rhinorrhea, sinus pressure and sore throat. Eyes: Negative for photophobia, discharge and visual disturbance. Respiratory: Positive for cough, shortness of breath and wheezing. Negative for chest tightness. Cardiovascular: Negative for chest pain, palpitations and leg swelling. Gastrointestinal: Negative for abdominal distention, abdominal pain, constipation, diarrhea and vomiting. Endocrine: Negative for polydipsia and polyuria. Genitourinary: Negative for decreased urine volume, difficulty urinating, frequency and urgency. Musculoskeletal: Negative for arthralgias, gait problem and myalgias. Skin: Negative for rash. Allergic/Immunologic: Negative for food allergies. Neurological: Negative for dizziness, weakness, numbness and headaches. Hematological: Negative for adenopathy. Psychiatric/Behavioral: Negative for dysphoric mood and sleep disturbance. The patient is not nervous/anxious. Objective:     /82   Pulse 123   Temp 97.3 °F (36.3 °C)   SpO2 95%   Physical Exam  Constitutional:       General: She is not in acute distress. Appearance: Normal appearance. She is not ill-appearing. HENT:      Head: Normocephalic and atraumatic. Right Ear: External ear normal.      Left Ear: External ear normal.      Nose: Nose normal.      Mouth/Throat:      Mouth: Mucous membranes are moist.   Eyes:      Extraocular Movements: Extraocular movements intact. Conjunctiva/sclera: Conjunctivae normal.      Pupils: Pupils are equal, round, and reactive to light. Neck:      Musculoskeletal: Normal range of motion and neck supple. Vascular: No carotid bruit. Cardiovascular:      Rate and Rhythm: Normal rate and regular rhythm. Pulses: Normal pulses. Heart sounds: Normal heart sounds. Pulmonary:      Effort: Pulmonary effort is normal. No respiratory distress. Breath sounds: Normal breath sounds. Abdominal:      General: Bowel sounds are normal. There is no distension. Tenderness: There is no abdominal tenderness. Musculoskeletal: Normal range of motion. Lymphadenopathy:      Cervical: No cervical adenopathy. Skin:     General: Skin is warm and dry. Neurological:      General: No focal deficit present.       Mental Status: She is alert and oriented to person, place, and time. Psychiatric:         Mood and Affect: Mood normal.         Behavior: Behavior normal.         Thought Content: Thought content normal.         Assessment:       Diagnosis Orders   1. Cough  predniSONE (DELTASONE) 20 MG tablet    XR CHEST (2 VW)    COVID-19 Ambulatory   2. Mild pulmonary hypertension ([address]  XR CHEST (2 VW)    COVID-19 Ambulatory   3. Type 2 diabetes mellitus with diabetic peripheral angiopathy without gangrene, without long-term current use of insulin ([facility])          Plan:       CXR   Prednisone   Take albuterol treatments at home   Tessalon perrles at home. Watch for fever. GO to ER with worsening SOB  COVID swab    1. Cough  - predniSONE (DELTASONE) 20 MG tablet; Take 1 tablet by mouth 2 times daily for 5 days  Dispense: 10 tablet; Refill: 0  - XR CHEST (2 VW); Future  - COVID-19 Ambulatory; Future    2. Mild pulmonary hypertension ([address]  Patient educated that her pulmonary hypertension puts her at high risk if Covid is positive. Patient educated to get a pulse ox and make sure that it stays above 90. Educated go to ER if symptoms do not improve with breathing treatment and prednisone. - XR CHEST (2 VW); Future  - COVID-19 Ambulatory; Future    3. Type 2 diabetes mellitus with diabetic peripheral angiopathy without gangrene, without long-term current use of insulin [facility])  Patient educated that the prednisone will increase her blood sugars. Patient told to watch her blood sugars extra close during this time. Return for Follow up if symptoms persist or worsen.     Orders Placed This Encounter   Procedures    XR CHEST (2 VW)     Standing Status:   Future     Standing Expiration Date:   11/23/2021     Order Specific Question:   Reason for exam:     Answer:   Cough no fever    COVID-19 Ambulatory     Standing Status:   Future     Standing Expiration Date:   11/23/2021     Scheduling Instructions:      Saline media preferred given current shortage of viral transport media but both acceptable     Order Specific Question:   Is this test for diagnosis or screening? Answer:   Diagnosis of ill patient     Order Specific Question:   Symptomatic for COVID-19 as defined by CDC? Answer:   Yes     Order Specific Question:   Date of Symptom Onset     Answer:   11/23/2020     Order Specific Question:   Hospitalized for COVID-19? Answer:   No     Order Specific Question:   Admitted to ICU for COVID-19? Answer:   No     Order Specific Question:   Employed in healthcare setting? Answer:   No     Order Specific Question:   Resident in a congregate (group) care setting? Answer:   No     Order Specific Question:   Pregnant? Answer:   No     Order Specific Question:   Previously tested for COVID-19? Answer:   Yes     Orders Placed This Encounter   Medications    predniSONE (DELTASONE) 20 MG tablet     Sig: Take 1 tablet by mouth 2 times daily for 5 days     Dispense:  10 tablet     Refill:  0       Patient given educationalmaterials - see patient instructions. Discussed use, benefit, and side effectsof prescribed medications. All patient questions answered. Pt voiced understanding. Reviewed health maintenance. Instructed to continue current medications, diet andexercise. Patient agreed with treatment plan. Follow up as directed.      Electronicallysigned by AC Loza on 11/23/2020 at 5:21 PM

## 2020-11-23 NOTE — TELEPHONE ENCOUNTER
Reason for Disposition   MILD difficulty breathing (e.g., minimal/no SOB at rest, SOB with walking, pulse < 100) of new onset or worse than normal    Answer Assessment - Initial Assessment Questions  1. RESPIRATORY STATUS: \"Describe your breathing? \" (e.g., wheezing, shortness of breath, unable to speak, severe coughing)       sob  2. ONSET: \"When did this breathing problem begin? \"       This morning   3. PATTERN \"Does the difficult breathing come and go, or has it been constant since it started? \"       Constant   4. SEVERITY: \"How bad is your breathing? \" (e.g., mild, moderate, severe)     - MILD: No SOB at rest, mild SOB with walking, speaks normally in sentences, can lay down, no retractions, pulse < 100.     - MODERATE: SOB at rest, SOB with minimal exertion and prefers to sit, cannot lie down flat, speaks in phrases, mild retractions, audible wheezing, pulse 100-120.     - SEVERE: Very SOB at rest, speaks in single words, struggling to breathe, sitting hunched forward, retractions, pulse > 120       Moderate   5. RECURRENT SYMPTOM: \"Have you had difficulty breathing before? \" If so, ask: \"When was the last time? \" and \"What happened that time? \"       Yes bronchitis   6. CARDIAC HISTORY: \"Do you have any history of heart disease? \" (e.g., heart attack, angina, bypass surgery, angioplasty)       no  7. LUNG HISTORY: \"Do you have any history of lung disease? \"  (e.g., pulmonary embolus, asthma, emphysema)      no  8. CAUSE: \"What do you think is causing the breathing problem? \"       Cold   9. OTHER SYMPTOMS: \"Do you have any other symptoms? (e.g., dizziness, runny nose, cough, chest pain, fever)      Runny nose, chest tightness   10. PREGNACY: \"Is there any chance you are pregnant? \" \"When was your last menstrul lperiod? \"        no  11. TRAVEL: \"Have you traveled out of the country in the last month? \" (e.g., travel history, exposures)        no    Protocols used: BREATHING DIFFICULTY-ADULT-[place]    Patient called pre-service center [facility]) [place] with red flag complaint. Brief description of triage: pt reports getting caught in the rain yesterday at [place] and woke up with chest tightness r/t congestion and a cough. Triage indicates for patient to see pcp today     Care advice provided, patient verbalizes understanding; denies any other questions or concerns; instructed to call back for any new or worsening symptoms. Writer provided warm transfer to  [facility] for appointment scheduling. Attention Provider: Thank you for allowing me to participate in the care of your patient. The patient was connected to triage in response to information provided to the ECC. Please do not respond through this encounter as the response is not directed to a shared pool.

## 2020-11-27 LAB — SARS-COV-2, NAA: NOT DETECTED

## 2020-12-08 ENCOUNTER — OFFICE VISIT (OUTPATIENT)
Dept: PODIATRY | Age: 80
End: 2020-12-08
Payer: MEDICARE

## 2020-12-08 ENCOUNTER — OFFICE VISIT (OUTPATIENT)
Dept: PRIMARY CARE CLINIC | Age: 80
End: 2020-12-08
Payer: MEDICARE

## 2020-12-08 ENCOUNTER — HOSPITAL ENCOUNTER (OUTPATIENT)
Age: 80
Setting detail: SPECIMEN
Discharge: HOME | End: 2020-12-08
Payer: MEDICARE

## 2020-12-08 VITALS
BODY MASS INDEX: 32.84 KG/M2 | HEART RATE: 74 BPM | WEIGHT: 162.6 LBS | DIASTOLIC BLOOD PRESSURE: 74 MMHG | SYSTOLIC BLOOD PRESSURE: 128 MMHG | TEMPERATURE: 97.1 F

## 2020-12-08 VITALS — BODY MASS INDEX: 32.66 KG/M2 | WEIGHT: 162 LBS | HEIGHT: 59 IN

## 2020-12-08 PROBLEM — R41.3 MEMORY LOSS: Status: ACTIVE | Noted: 2020-12-08

## 2020-12-08 LAB
BILIRUBIN, POC: ABNORMAL
BLOOD URINE, POC: ABNORMAL
CLARITY, POC: ABNORMAL
COLOR, POC: YELLOW
GLUCOSE URINE, POC: 500
KETONES, POC: ABNORMAL
LEUKOCYTE EST, POC: ABNORMAL
NITRITE, POC: ABNORMAL
PH, POC: 5.5
PROTEIN, POC: ABNORMAL
SPECIFIC GRAVITY, POC: 1.02
UROBILINOGEN, POC: 0.2

## 2020-12-08 PROCEDURE — A5500 DIAB SHOE FOR DENSITY INSERT: HCPCS | Performed by: PODIATRIST

## 2020-12-08 PROCEDURE — 1036F TOBACCO NON-USER: CPT | Performed by: FAMILY MEDICINE

## 2020-12-08 PROCEDURE — 99213 OFFICE O/P EST LOW 20 MIN: CPT | Performed by: FAMILY MEDICINE

## 2020-12-08 PROCEDURE — G8484 FLU IMMUNIZE NO ADMIN: HCPCS | Performed by: FAMILY MEDICINE

## 2020-12-08 PROCEDURE — A5513 MULTI DEN INSERT CUSTOM MOLD: HCPCS | Performed by: PODIATRIST

## 2020-12-08 PROCEDURE — 81003 URINALYSIS AUTO W/O SCOPE: CPT | Performed by: FAMILY MEDICINE

## 2020-12-08 PROCEDURE — 11721 DEBRIDE NAIL 6 OR MORE: CPT | Performed by: PODIATRIST

## 2020-12-08 PROCEDURE — G8427 DOCREV CUR MEDS BY ELIG CLIN: HCPCS | Performed by: FAMILY MEDICINE

## 2020-12-08 PROCEDURE — G8400 PT W/DXA NO RESULTS DOC: HCPCS | Performed by: FAMILY MEDICINE

## 2020-12-08 PROCEDURE — 1090F PRES/ABSN URINE INCON ASSESS: CPT | Performed by: FAMILY MEDICINE

## 2020-12-08 PROCEDURE — 99999 PR OFFICE/OUTPT VISIT,PROCEDURE ONLY: CPT | Performed by: PODIATRIST

## 2020-12-08 PROCEDURE — 4040F PNEUMOC VAC/ADMIN/RCVD: CPT | Performed by: FAMILY MEDICINE

## 2020-12-08 PROCEDURE — 1123F ACP DISCUSS/DSCN MKR DOCD: CPT | Performed by: FAMILY MEDICINE

## 2020-12-08 PROCEDURE — G8417 CALC BMI ABV UP PARAM F/U: HCPCS | Performed by: FAMILY MEDICINE

## 2020-12-08 RX ORDER — MELATONIN
2000 DAILY
Qty: 90 TABLET | Refills: 1 | COMMUNITY
Start: 2020-12-08 | End: 2021-01-04

## 2020-12-08 RX ORDER — LANOLIN ALCOHOL/MO/W.PET/CERES
1000 CREAM (GRAM) TOPICAL WEEKLY
Qty: 30 TABLET | Refills: 3 | COMMUNITY
Start: 2020-12-08

## 2020-12-08 ASSESSMENT — ENCOUNTER SYMPTOMS
CONSTIPATION: 1
DIARRHEA: 0
NAUSEA: 0
SHORTNESS OF BREATH: 0
BACK PAIN: 0
COLOR CHANGE: 0

## 2020-12-08 NOTE — PROGRESS NOTES
Angie Krishnamurthy a 79 y.o. female who presents today for her medical conditions/complaints as notedbelow. Chief Complaint   Patient presents with    Altered Mental Status     All has progressed since last Thursday 12/3/20. Pt states that she cant remeber days and appointments.  Depression     Has to force self to get up and get dressed and start moving.  Fatigue     Pt states that she always so tired she just wants to sleep.  Generalized Body Aches     Pt state sthat her whole body hurts especially hands. HPI:   HPI    Depressed for a week or more  Memory is bad, she thought she had an order for blood work and it was an appt reminder. Memory worse and concentration zero x 1 week. Sometimes is just staring off in space. Sees Dr Teri Mauricio  Hx of memory problems: sees Dr Hardik Poole all over. LDL Cholesterol (mg/dL)   Date Value   07/06/2020 85   10/17/2018 137 (H)   07/16/2018 64     LDL Calculated (mg/dL)   Date Value   03/12/2015 47   08/18/2014 39       (goal LDL is <100)   AST (U/L)   Date Value   08/09/2019 18     ALT (U/L)   Date Value   08/09/2019 14     BUN (mg/dL)   Date Value   06/30/2020 20     BP Readings from Last 3 Encounters:   12/08/20 128/74   11/23/20 136/82   10/05/20 122/68          (goal 120/80)    Past Medical History:   Diagnosis Date    Age-related cognitive decline     Ankle pain     Left ankle fracture in ortho boat.    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    left with urinary incontinenc post surgical    Caffeine use     2 coffee/day, 1-2 tea per week    Chronic back pain     Chronic ITP (idiopathic thrombocytopenia) (HCC)     CVA (cerebral infarction) 2008, 2010    balance issues, short term memory loss    Diabetic gastroparesis ([address]     Diverticular disease 1986    GERD (gastroesophageal reflux disease)     Hiatal hernia     Hip fracture ([address]     History of blood transfusion     History of decreased platelet count     Hyperlipemia     Hypertension     Internal hemorrhoid     Macular degeneration of left eye 1980's    S/P laser treatment    Nausea and vomiting     Neuropathy     Osteoarthritis     Ringing in ears     Self-catheterizes urinary bladder     6-7 times daily    TIA (transient ischemic attack) 2000    x1    Tubular adenoma     colon polyps    Type II or unspecified type diabetes mellitus without mention of complication, not stated as uncontrolled     Urinary incontinence     frequent UTI s/p infection, surgical dilatation lead to incontinence    Wears dentures     full on top, partial on bottom    Wears glasses       Past Surgical History:   Procedure Laterality Date    APPENDECTOMY  1962    BLADDER SURGERY      bladder stimulator    BLADDER SUSPENSION  2002    multiple    CARDIAC CATHETERIZATION  2008    no stents    CARDIOVASCULAR STRESS TEST  12/2014    CATARACT REMOVAL WITH IMPLANT Left 11/07/2017    [facility]    CATARACT REMOVAL WITH IMPLANT Right 11/28/2017    [facility]    COLON SURGERY      colostomy reversal    COLONOSCOPY  4/7/16    tubular adenoma x3; internal hemorrhoids    COLONOSCOPY N/A 11/6/2019    COLONOSCOPY DIAGNOSTIC performed by Raphael Newton MD at [address] [place] [address]    bowel obstruction/ rupture from diverticular disease, reversal 3 mos later    CYSTOSCOPY  09/08/2017    W/ 200IU Botox     FRACTURE SURGERY Right 2011    hip    FRACTURE SURGERY Left 2001    WRIST    FRACTURE SURGERY Left 2013    ankle    HERNIA REPAIR      HYSTERECTOMY  1969    JOINT REPLACEMENT Bilateral 2000    BILAT KNEES    LUMBAR FUSION  2017    L2/L3    PA XCAPSL CTRC RMVL INSJ IO LENS PROSTH W/O ECP Left 11/7/2017    EYE CATARACT EMULSIFICATION IOL IMPLANT performed by Cherylene Funk, MD at [facility] 60 RMVL INSJ IO LENS PROSTH W/O ECP Right 11/28/2017    EYE CATARACT EMULSIFICATION IOL IMPLANT performed by Jovanni Drummond MD at [facility] Right 10/27/15    WITH POLY EXCHANGE BIOMET AND GPS APPLICATION    REVISION TOTAL KNEE ARTHROPLASTY Left 2020    KNEE TOTAL ARTHROPLASTY REVISION - POLY EXCHANGE performed by Allie Seals MD at [address] [facility]      fusion, did not take   [address]    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    NEAL AND BSO      TONSILLECTOMY      UPPER GASTROINTESTINAL ENDOSCOPY      UPPER GASTROINTESTINAL ENDOSCOPY  14    UPPER GASTROINTESTINAL ENDOSCOPY  2016    mild gastritis    UPPER GASTROINTESTINAL ENDOSCOPY N/A 2018    retained thick secretions in proximal esophagus       Family History   Problem Relation Age of Onset    Breast Cancer Mother     Cancer Mother         breast and uterine  39    Heart Disease Father     Heart Attack Father          28   [address] [facility] Maternal Grandmother     Cancer Brother 46        bronchogenic adenocarcinoma cancer    Lung Cancer Brother     Cancer Sister         lung    Lung Cancer Sister          72    Breast Cancer Sister          62    Cancer Sister         breast       Social History     Tobacco Use    Smoking status: Former Smoker     Packs/day: 0.50     Years: 20.00     Pack years: 10.00     Last attempt to quit: 1982     Years since quittin.5    Smokeless tobacco: Former User     Quit date: 1982   Substance Use Topics    Alcohol use: No      Current Outpatient Medications   Medication Sig Dispense Refill    vitamin D3 (CHOLECALCIFEROL) 25 MCG (1000 UT) TABS tablet Take 2 tablets by mouth daily 90 tablet 1    vitamin B-12 (CYANOCOBALAMIN) 1000 MCG tablet Take 1 tablet by mouth once a week 30 tablet 3    dexlansoprazole (DEXILANT) 60 MG CPDR delayed release capsule Take 1 capsule by mouth daily 2 hours before bed 30 capsule 3    tiZANidine (ZANAFLEX) 4 MG Swelling    Betadine [Povidone Iodine] Hives    Cephalexin Hives and Swelling    Cephalexin Itching     Other reaction(s): Hallucinations  In 1969 received demerol and keflex together so was told to list both as allergies    Cozaar [Losartan] Nausea Only     Pt also gets sores on toungue    Cymbalta [Duloxetine Hcl] Diarrhea and Nausea And Vomiting     Weakness    Demerol Hives and Swelling    Doxycycline Other (See Comments)     Sore mouth        Meperidine Itching     Other reaction(s): Hallucinations      Morphine Hives and Itching    Pcn [Penicillins] Hives, Swelling and Itching     Other reaction(s): Intolerance-unknown    Zithromax [Azithromycin] Other (See Comments)     Sore mouth      Clindamycin/Lincomycin     Etodolac     Fluorescein     Iodine      Other reaction(s): Intolerance-unknown    Lisinopril      cough    Penicillin G     Soap     Toviaz [Fesoterodine Fumarate Er]     Metformin And Related Nausea And Vomiting     Diarrhea and N/V       Health Maintenance   Topic Date Due    Shingles Vaccine (2 of 2) 01/22/2019    Annual Wellness Visit (AWV)  06/19/2019    DTaP/Tdap/Td vaccine (1 - Tdap) 01/09/2022 (Originally 8/12/1959)    Potassium monitoring  06/30/2021    Creatinine monitoring  06/30/2021    Lipid screen  07/06/2021    Flu vaccine  Completed    Pneumococcal 65+ years Vaccine  Completed    DEXA (modify frequency per FRAX score)  Addressed    Hepatitis A vaccine  Aged Out    Hib vaccine  Aged Out    Meningococcal (ACWY) vaccine  Aged Out       Subjective:      Review of Systems   Gastrointestinal: Positive for constipation. Psychiatric/Behavioral: Positive for dysphoric mood. The patient is nervous/anxious. takes colace once a week due to constipation. Objective:     /74   Pulse 74   Temp 97.1 °F (36.2 °C)   Wt 162 lb 9.6 oz (73.8 kg)   BMI 32.84 kg/m²   Physical Exam  Vitals signs and nursing note reviewed.    Constitutional:       Appearance: Normal appearance. HENT:      Head: Normocephalic and atraumatic. Musculoskeletal:      Comments: Forward posture, uses a cane   Neurological:      Mental Status: She is alert and oriented to person, place, and time. Comments: Maximum score Patient's score questions  5  What is the year? Season? Date? Day ? Month?  5  5  Where are we now ? [address]? Town? Hospital or office ? 5  3  Name 3 objects and have patient repeat all 3.           3  5  Count backward from 100 by sevens ( 100-7 =) 0  3  Can you repeat the 3 objects ( from above question) ( 3 word recall)                3     2  Show 2 simple objects: can patient name them. 2  1  Repeat the phrase \" no ifs, ands or buts                 1  3  Take the paper in your right hand, fold it in half and put it on the floor ( or chair)                 3  1  Please read and so what it says ( Close your eyes) 1  1  Write sentence: the black dog runs                   1  1  Please copy this picture: ( examiner draws an abstract simple figure and have patient copy it. )  30   25 Total      Can the clock, numbers and hands be drawn correctly ? Draw a clock with numbers, then ask patient to put hands saying 6:15                       Yes   Psychiatric:         Behavior: Behavior normal.         Thought Content: Thought content normal.      Comments: Tearful off and on. Assessment:       Diagnosis Orders   1. Memory loss  CBC    Comprehensive Metabolic Panel    POCT Urinalysis No Micro (Auto)   2. Fatigue, unspecified type  CBC    Comprehensive Metabolic Panel    POCT Urinalysis No Micro (Auto)   3. Essential hypertension     4. B12 deficiency     5. Vitamin D deficiency          Plan:      Return in about 5 weeks (around 1/12/2021).   Start voltaren gel  Stop the bladder pill since sometimes it can cause memory issues  Restart Vit D and Vit B12 ( she hasn't been taking it)  Discussed with patient it could be her bladder pill that

## 2020-12-08 NOTE — PATIENT INSTRUCTIONS
Patient Education        Recovering From Depression: Care Instructions  Your Care Instructions     Taking good care of yourself is important as you recover from depression. In time, your symptoms will fade as your treatment takes hold. Do not give up. Instead, focus your energy on getting better. Your mood will improve. It just takes some time. Focus on things that can help you feel better, such as being with friends and family, eating well, and getting enough rest. But take things slowly. Do not do too much too soon. You will begin to feel better gradually. Follow-up care is a key part of your treatment and safety. Be sure to make and go to all appointments, and call your doctor if you are having problems. It's also a good idea to know your test results and keep a list of the medicines you take. How can you care for yourself at home? Be realistic  · If you have a large task to do, break it up into smaller steps you can handle, and just do what you can. · You may want to put off important decisions until your depression has lifted. If you have plans that will have a major impact on your life, such as marriage, divorce, or a job change, try to wait a bit. Talk it over with friends and loved ones who can help you look at the overall picture first.  · Reaching out to people for help is important. Do not isolate yourself. Let your family and friends help you. Find someone you can trust and confide in, and talk to that person. · Be patient, and be kind to yourself. Remember that depression is not your fault and is not something you can overcome with willpower alone. Treatment is important for depression, just like for any other illness. Feeling better takes time, and your mood will improve little by little. Stay active  · Stay busy and get outside. Take a walk, or try some other light exercise. · Talk with your doctor about an exercise program. Exercise can help with mild depression.   · Go to a movie or concert. Take part in a Jainism activity or other social gathering. Go to a [employer] game. · Ask a friend to have dinner with you. Take care of yourself  · Eat a balanced diet with plenty of fresh fruits and vegetables, whole grains, and lean protein. If you have lost your appetite, eat small snacks rather than large meals. · Avoid using illegal drugs or marijuana and drinking alcohol. Do not take medicines that have not been prescribed for you. They may interfere with medicines you may be taking for depression, or they may make your depression worse. · Take your medicines exactly as they are prescribed. You may start to feel better within 1 to 3 weeks of taking antidepressant medicine. But it can take as many as 6 to 8 weeks to see more improvement. If you have questions or concerns about your medicines, or if you do not notice any improvement by 3 weeks, talk to your doctor. · Continue to take your medicine after your symptoms improve. Taking your medicine for at least 6 months after you feel better can help keep you from getting depressed again. If this isn't the first time you have been depressed, your doctor may recommend you to take medicine even longer. · If you have any side effects from your medicine, tell your doctor. Many side effects are mild and will go away on their own after you have been taking the medicine for a few weeks. Some may last longer. Talk to your doctor if side effects are bothering you too much. You might be able to try a different medicine. · Continue counseling. It may help prevent depression from returning, especially if you've had multiple episodes of depression. Talk with your counselor if you are having a hard time attending your sessions or you think the sessions aren't working. Don't just stop going. · Get enough sleep. Talk to your doctor if you are having problems sleeping. · Avoid sleeping pills unless they are prescribed by the doctor treating your depression.  Sleeping pills may make you groggy during the day, and they may interact with other medicine you are taking. · If you have any other illnesses, such as diabetes, heart disease, or high blood pressure, make sure to continue with your treatment. Tell your doctor about all of the medicines you take, including those with or without a prescription. · If you or someone you know talks about suicide, self-harm, or feeling hopeless, get help right away. Call the [facility] at 1-800-273-talk (5-131.295.8368) or text HOME to 440099 to access the Crisis Text Line. Consider saving these numbers in your phone. When should you call for help? Call 936 anytime you think you may need emergency care. For example, call if:    · You feel like hurting yourself or someone else.     · Someone you know has depression and is about to attempt or is attempting suicide. Call your doctor now or seek immediate medical care if:    · You hear voices.     · Someone you know has depression and:  ? Starts to give away his or her possessions. ? Uses illegal drugs or drinks alcohol heavily. ? Talks or writes about death, including writing suicide notes or talking about guns, knives, or pills. ? Starts to spend a lot of time alone. ? Acts very aggressively or suddenly appears calm. Watch closely for changes in your health, and be sure to contact your doctor if:    · You do not get better as expected. Where can you learn more? Go to https://[email or web address]­ssimaefraInventbuy.[employer]. org and sign in to your [employer] account. Enter N254 in the [facility] box to learn more about \"Recovering From Depression: Care Instructions. \"     If you do not have an account, please click on the \"Sign Up Now\" link. Current as of: January 31, 2020               Content Version: 12.6  © 0493-3530 [employer]. Care instructions adapted under license by [facility] ([facility]).  If you have questions about a medical condition or this instruction, always ask your healthcare professional. [place] [address] any warranty or liability for your use of this information. Patient Education        Learning About Depression  What is depression? Depression is an illness that affects how a person feels, thinks, and acts. It's different from normal feelings of sadness or grief. A person who has depression may have less energy. He or she may lose interest in daily activities and may feel sad and grouchy for a long time. Depression is a common illness. It affects men and women of all ages and backgrounds. Many people, and sometimes their families, feel embarrassed or ashamed about having depression. But it isn't a sign of personal weakness. It's not a character flaw. A person who is depressed is not \"crazy. \" Depression is a medical illness. It's caused by changes in the natural chemicals in the brain. Most experts believe that a combination of family history (a person's genes) and stressful life events can cause depression. Health problems may also cause depression or make it worse. It's common for people with long-term (chronic) health problems like coronary artery disease, diabetes, cancer, or chronic pain to feel depressed. It's important to know that depression can be treated. The first step toward feeling better is often just seeing that the problem exists. What are the symptoms? The symptoms of depression may be hard to notice at first. They vary among people, and it's easy to confuse them with just feeling \"off. \" The two most common symptoms are:  · Feeling sad or hopeless nearly every day for at least 2 weeks. · Losing interest in or not getting pleasure from most activities that used to be enjoyable, and feeling this way nearly every day for at least 2 weeks. Other symptoms may appear. A person with depression may, almost every day:  · Eat or sleep more or less than usual.  · Feel tired.   · Feel unworthy or guilty. · Find it hard to focus, remember things, or make decisions. A serious symptom of depression is thinking about death or suicide. If you or someone you care about talks about this or about feeling hopeless, get help right away. How is it treated? Doctors usually treat depression with medicines or counseling. Often a combination of the two works best. Many people don't get help because they think that they'll get over the depression on their own. But some people do not get better without treatment. Antidepressant medicines can improve the symptoms of depression in 1 to 3 weeks. But it can take 6 to 8 weeks to see more improvement. Your doctor will likely have you keep taking these medicines for at least 6 months. In many cases, counseling can work as well as medicines to treat mild to moderate depression. Counseling is done by licensed mental health providers, such as psychologists and social workers. This kind of treatment deals with how you think about things and how you act each day. If depression is caused by a medical problem, treating that problem may also help relieve the depression. What can you do to help someone with depression? If someone you care about is depressed, you may feel helpless. But there are some things you can do. · Help the person get treatment or stay with it. This is the best thing you can do. · Support and encourage the person. · Help the person have good health habits. Urge him or her to get regular exercise, eat a balanced diet, and get enough sleep. · Take care of yourself. Ask others to give you emotional and practical support while you are helping a friend or loved one who has depression. · Keep the numbers for these national suicide hotlines: 0-050-266-TALK (4-797.166.7886) and 0-462-LJGAPUM (7-840.707.3821). If you or someone you know talks about suicide or feeling hopeless, get help right away. Where can you learn more?   Go to https://chpepiceweb.healthTestt. org and sign in to your [employer] account. Enter Z488 in the [facility] box to learn more about \"Learning About Depression. \"     If you do not have an account, please click on the \"Sign Up Now\" link. Current as of: January 31, 2020               Content Version: 12.6  © 0661-0812 [employer]. Care instructions adapted under license by [facility] ([facility]). If you have questions about a medical condition or this instruction, always ask your healthcare professional. [address] any warranty or liability for your use of this information.

## 2020-12-10 ENCOUNTER — HOSPITAL ENCOUNTER (OUTPATIENT)
Age: 80
Setting detail: SPECIMEN
Discharge: HOME | End: 2020-12-10
Payer: MEDICARE

## 2020-12-10 LAB
ALBUMIN SERPL-MCNC: 4.1 G/DL (ref 3.5–5.2)
ALBUMIN/GLOBULIN RATIO: 1.4 (ref 1–2.5)
ALP BLD-CCNC: 73 U/L (ref 35–104)
ALT SERPL-CCNC: 18 U/L (ref 5–33)
ANION GAP SERPL CALCULATED.3IONS-SCNC: 17 MMOL/L (ref 9–17)
AST SERPL-CCNC: 19 U/L
BILIRUB SERPL-MCNC: 0.62 MG/DL (ref 0.3–1.2)
BUN BLDV-MCNC: 17 MG/DL (ref 8–23)
BUN/CREAT BLD: ABNORMAL (ref 9–20)
CALCIUM SERPL-MCNC: 9.5 MG/DL (ref 8.6–10.4)
CHLORIDE BLD-SCNC: 108 MMOL/L (ref 98–107)
CO2: 17 MMOL/L (ref 20–31)
CREAT SERPL-MCNC: 0.77 MG/DL (ref 0.5–0.9)
CULTURE: ABNORMAL
GFR AFRICAN AMERICAN: >60 ML/MIN
GFR NON-AFRICAN AMERICAN: >60 ML/MIN
GFR SERPL CREATININE-BSD FRML MDRD: ABNORMAL ML/MIN/{1.73_M2}
GFR SERPL CREATININE-BSD FRML MDRD: ABNORMAL ML/MIN/{1.73_M2}
GLUCOSE BLD-MCNC: 148 MG/DL (ref 70–99)
HCT VFR BLD CALC: 44.2 % (ref 36.3–47.1)
HEMOGLOBIN: 13.7 G/DL (ref 11.9–15.1)
Lab: ABNORMAL
MCH RBC QN AUTO: 29.5 PG (ref 25.2–33.5)
MCHC RBC AUTO-ENTMCNC: 31 G/DL (ref 28.4–34.8)
MCV RBC AUTO: 95.3 FL (ref 82.6–102.9)
NRBC AUTOMATED: 0 PER 100 WBC
PDW BLD-RTO: 14.1 % (ref 11.8–14.4)
PLATELET # BLD: NORMAL K/UL (ref 138–453)
PLATELET, FLUORESCENCE: 120 K/UL (ref 138–453)
PLATELET, IMMATURE FRACTION: 23.5 % (ref 1.1–10.3)
PMV BLD AUTO: NORMAL FL (ref 8.1–13.5)
POTASSIUM SERPL-SCNC: 4 MMOL/L (ref 3.7–5.3)
RBC # BLD: 4.64 M/UL (ref 3.95–5.11)
SODIUM BLD-SCNC: 142 MMOL/L (ref 135–144)
SPECIMEN DESCRIPTION: ABNORMAL
TOTAL PROTEIN: 7 G/DL (ref 6.4–8.3)
WBC # BLD: 7.6 K/UL (ref 3.5–11.3)

## 2020-12-10 RX ORDER — CIPROFLOXACIN 250 MG/1
250 TABLET, FILM COATED ORAL 2 TIMES DAILY
Qty: 10 TABLET | Refills: 0 | Status: SHIPPED | OUTPATIENT
Start: 2020-12-10 | End: 2020-12-15

## 2020-12-11 ENCOUNTER — TELEPHONE (OUTPATIENT)
Dept: PRIMARY CARE CLINIC | Age: 80
End: 2020-12-11

## 2020-12-11 NOTE — TELEPHONE ENCOUNTER
Called pt to notify her of test results and she states that she received a shingles vaccine in her left arm and now her left side of neck and arm is sore/stiff she is asking if this is a normal side effect of the shingles shot. Please advise.

## 2020-12-15 LAB
AVERAGE GLUCOSE: NORMAL
HBA1C MFR BLD: 6.7 %

## 2021-01-04 ENCOUNTER — OFFICE VISIT (OUTPATIENT)
Dept: PRIMARY CARE CLINIC | Age: 81
End: 2021-01-04
Payer: MEDICARE

## 2021-01-04 VITALS
OXYGEN SATURATION: 95 % | HEART RATE: 68 BPM | TEMPERATURE: 97.9 F | DIASTOLIC BLOOD PRESSURE: 72 MMHG | BODY MASS INDEX: 32.64 KG/M2 | SYSTOLIC BLOOD PRESSURE: 124 MMHG | WEIGHT: 161.6 LBS

## 2021-01-04 DIAGNOSIS — B35.4 TINEA CORPORIS: ICD-10-CM

## 2021-01-04 DIAGNOSIS — I10 ESSENTIAL HYPERTENSION: ICD-10-CM

## 2021-01-04 DIAGNOSIS — R41.3 MEMORY LOSS: Primary | ICD-10-CM

## 2021-01-04 DIAGNOSIS — K64.4 EXTERNAL HEMORRHOIDS: ICD-10-CM

## 2021-01-04 DIAGNOSIS — K59.04 CHRONIC IDIOPATHIC CONSTIPATION: ICD-10-CM

## 2021-01-04 PROCEDURE — G8427 DOCREV CUR MEDS BY ELIG CLIN: HCPCS | Performed by: FAMILY MEDICINE

## 2021-01-04 PROCEDURE — 1036F TOBACCO NON-USER: CPT | Performed by: FAMILY MEDICINE

## 2021-01-04 PROCEDURE — G8417 CALC BMI ABV UP PARAM F/U: HCPCS | Performed by: FAMILY MEDICINE

## 2021-01-04 PROCEDURE — G8400 PT W/DXA NO RESULTS DOC: HCPCS | Performed by: FAMILY MEDICINE

## 2021-01-04 PROCEDURE — 1090F PRES/ABSN URINE INCON ASSESS: CPT | Performed by: FAMILY MEDICINE

## 2021-01-04 PROCEDURE — 1123F ACP DISCUSS/DSCN MKR DOCD: CPT | Performed by: FAMILY MEDICINE

## 2021-01-04 PROCEDURE — 99214 OFFICE O/P EST MOD 30 MIN: CPT | Performed by: FAMILY MEDICINE

## 2021-01-04 PROCEDURE — G8484 FLU IMMUNIZE NO ADMIN: HCPCS | Performed by: FAMILY MEDICINE

## 2021-01-04 PROCEDURE — 4040F PNEUMOC VAC/ADMIN/RCVD: CPT | Performed by: FAMILY MEDICINE

## 2021-01-04 RX ORDER — LACTULOSE 10 G/15ML
10 SOLUTION ORAL 3 TIMES DAILY PRN
Qty: 946 ML | Refills: 1 | Status: SHIPPED | OUTPATIENT
Start: 2021-01-04 | End: 2021-02-03

## 2021-01-04 RX ORDER — HYDROCORTISONE 25 MG/G
CREAM TOPICAL 2 TIMES DAILY
Qty: 28 G | Refills: 3 | Status: SHIPPED | OUTPATIENT
Start: 2021-01-04

## 2021-01-04 RX ORDER — BENZONATATE 200 MG/1
1 CAPSULE ORAL PRN
COMMUNITY
End: 2021-11-29

## 2021-01-04 RX ORDER — OLMESARTAN MEDOXOMIL 5 MG/1
1 TABLET ORAL 2 TIMES DAILY
COMMUNITY
End: 2021-02-28

## 2021-01-04 RX ORDER — ERGOCALCIFEROL (VITAMIN D2) 1250 MCG
1 CAPSULE ORAL WEEKLY
COMMUNITY
End: 2021-08-19

## 2021-01-04 RX ORDER — ESOMEPRAZOLE MAGNESIUM 40 MG/1
40 CAPSULE, DELAYED RELEASE ORAL
Qty: 90 CAPSULE | Refills: 3 | Status: SHIPPED | OUTPATIENT
Start: 2021-01-04

## 2021-01-04 RX ORDER — NYSTATIN 100000 [USP'U]/G
POWDER TOPICAL
Qty: 45 G | Refills: 3 | Status: SHIPPED | OUTPATIENT
Start: 2021-01-04

## 2021-01-04 SDOH — ECONOMIC STABILITY: TRANSPORTATION INSECURITY
IN THE PAST 12 MONTHS, HAS THE LACK OF TRANSPORTATION KEPT YOU FROM MEDICAL APPOINTMENTS OR FROM GETTING MEDICATIONS?: NO

## 2021-01-04 SDOH — ECONOMIC STABILITY: FOOD INSECURITY: WITHIN THE PAST 12 MONTHS, THE FOOD YOU BOUGHT JUST DIDN'T LAST AND YOU DIDN'T HAVE MONEY TO GET MORE.: NEVER TRUE

## 2021-01-04 ASSESSMENT — PATIENT HEALTH QUESTIONNAIRE - PHQ9
SUM OF ALL RESPONSES TO PHQ9 QUESTIONS 1 & 2: 0
2. FEELING DOWN, DEPRESSED OR HOPELESS: 0
SUM OF ALL RESPONSES TO PHQ QUESTIONS 1-9: 0
1. LITTLE INTEREST OR PLEASURE IN DOING THINGS: 0
SUM OF ALL RESPONSES TO PHQ QUESTIONS 1-9: 0

## 2021-01-04 ASSESSMENT — ENCOUNTER SYMPTOMS
ABDOMINAL DISTENTION: 1
ABDOMINAL PAIN: 1
CONSTIPATION: 1

## 2021-01-04 NOTE — PATIENT INSTRUCTIONS
· Drink plenty of fluids, enough so that your urine is light yellow or clear like water. If you have kidney, heart, or liver disease and have to limit fluids, talk with your doctor before you increase the amount of fluids you drink. · Use a stool softener that contains bran or psyllium. You can save money by buying bran or psyllium (available in bulk at most health food stores) and sprinkling it on foods or stirring it into fruit juice. Or you can use a product such as Metamucil or Hydrocil. · Practice healthy bowel habits. ? Go to the bathroom as soon as you have the urge. ? Avoid straining to pass stools. Relax and give yourself time to let things happen naturally. ? Do not hold your breath while passing stools. ? Do not read while sitting on the toilet. Get off the toilet as soon as you have finished. · Take your medicines exactly as prescribed. Call your doctor if you think you are having a problem with your medicine. When should you call for help? Call 911 anytime you think you may need emergency care. For example, call if:    · You pass maroon or very bloody stools. Call your doctor now or seek immediate medical care if:    · You have increased pain.     · You have increased bleeding. Watch closely for changes in your health, and be sure to contact your doctor if:    · Your symptoms have not improved after 3 or 4 days. Where can you learn more? Go to https://[employer].EyeQuant. org and sign in to your [employer] account. Enter G482 in the [employer] box to learn more about \"Hemorrhoids: Care Instructions. \"     If you do not have an account, please click on the \"Sign Up Now\" link. Current as of: April 15, 2020               Content Version: 12.6  © 1534-7370 [employer]. Care instructions adapted under license by [facility] [place] ([facility]). If you have questions about a medical condition or this instruction, always ask your healthcare professional. [place] [address] any warranty or liability for your use of this information. Patient Education        Constipation: Care Instructions  Your Care Instructions     Constipation means that you have a hard time passing stools (bowel movements). People pass stools from 3 times a day to once every 3 days. What is normal for you may be different. Constipation may occur with pain in the rectum and cramping. The pain may get worse when you try to pass stools. Sometimes there are small amounts of bright red blood on toilet paper or the surface of stools. This is because of enlarged veins near the rectum (hemorrhoids). A few changes in your diet and lifestyle may help you avoid ongoing constipation. Your doctor may also prescribe medicine to help loosen your stool. Some medicines can cause constipation. These include pain medicines and antidepressants. Tell your doctor about all the medicines you take. Your doctor may want to make a medicine change to ease your symptoms. Follow-up care is a key part of your treatment and safety. Be sure to make and go to all appointments, and call your doctor if you are having problems. It's also a good idea to know your test results and keep a list of the medicines you take. How can you care for yourself at home? · Drink plenty of fluids, enough so that your urine is light yellow or clear like water. If you have kidney, heart, or liver disease and have to limit fluids, talk with your doctor before you increase the amount of fluids you drink. · Include high-fiber foods in your diet each day. These include fruits, vegetables, beans, and whole grains. · Get at least 30 minutes of exercise on most days of the week. Walking is a good choice. You also may want to do other activities, such as running, swimming, cycling, or playing tennis or team sports. · Take a fiber supplement, such as Citrucel or Metamucil, every day. Read and follow all instructions on the label. · Schedule time each day for a bowel movement. A daily routine may help. Take your time having your bowel movement. · Support your feet with a small step stool when you sit on the toilet. This helps flex your hips and places your pelvis in a squatting position. · Your doctor may recommend an over-the-counter laxative to relieve your constipation. Examples are Milk of Magnesia and MiraLax. Read and follow all instructions on the label. Do not use laxatives on a long-term basis. When should you call for help? Call your doctor now or seek immediate medical care if:    · You have new or worse belly pain.     · You have new or worse nausea or vomiting.     · You have blood in your stools. Watch closely for changes in your health, and be sure to contact your doctor if:    · Your constipation is getting worse.     · You do not get better as expected. Where can you learn more? Go to https://[employer]. org and sign in to your [employer] account. Enter [phone] in the [facility] box to learn more about \"Constipation: Care Instructions. \"     If you do not have an account, please click on the \"Sign Up Now\" link. Current as of: June 26, 2019               Content Version: 12.6  © 3187-5091 [employer]. Care instructions adapted under license by [facility] [employer] [facility] ([facility]). If you have questions about a medical condition or this instruction, always ask your healthcare professional. [place] [address] any warranty or liability for your use of this information.

## 2021-01-04 NOTE — PROGRESS NOTES
Alexy Beavers a 79 y.o. female who presents today for her medical conditions/complaints as notedbelow. Chief Complaint   Patient presents with    Memory Loss     5wk f/u    Fatigue     5wk f/u    Discuss Medications     Pt was to start B12 ans Vit D at last visit    Hypertension     5wk f/u         HPI:   HPI  Memory is better off the bladder pill and getting back on B12 and Vit D. Depression comes and goes. Is able to do more things on some days but tired other days. Hands hurt a lot from arthritis. Hx of back fusions. Drinks fluids but doesn't eat a lot. Having trouble with constipation, had to dig out her BM 3 times. Had bowel blockage. Has feeling she had to go but nothing comes out. Has taking dulcolax tabs. Tried chewables dulcolax  Miralax didn't help  Tried magnesium citrate. Tried linzess: didn't help. Tried Senekot S in past.   Has tried prunes and prune juice. Can't get fleets enema in due to bm in the way. Hx of colostomy. Hx of rectal surgery. Rash left abdomen skin fold since yesterday, has been trying the nystatin cream.     LDL Cholesterol (mg/dL)   Date Value   07/06/2020 85   10/17/2018 137 (H)   07/16/2018 64     LDL Calculated (mg/dL)   Date Value   03/12/2015 47   08/18/2014 39       (goal LDL is <100)   AST (U/L)   Date Value   12/10/2020 19     ALT (U/L)   Date Value   12/10/2020 18     BUN (mg/dL)   Date Value   12/10/2020 17     BP Readings from Last 3 Encounters:   01/04/21 124/72   12/08/20 128/74   11/23/20 136/82          (goal 120/80)    Past Medical History:   Diagnosis Date    Age-related cognitive decline     Ankle pain     Left ankle fracture in ortho boat.    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    Caffeine use     2 coffee/day, 1-2 tea per week    Chronic back pain     Chronic ITP (idiopathic thrombocytopenia) (HCC)  CVA (cerebral infarction) 2008, 2010    balance issues, short term memory loss    Diabetic gastroparesis ([address]     Diverticular disease 1986    GERD (gastroesophageal reflux disease)     Hiatal hernia     Hip fracture (HCC)     History of blood transfusion     History of decreased platelet count     Hyperlipemia     Hypertension     Internal hemorrhoid     Macular degeneration of left eye 1980's    S/P laser treatment    Nausea and vomiting     Neuropathy     Osteoarthritis     Ringing in ears     Self-catheterizes urinary bladder     6-7 times daily    TIA (transient ischemic attack) 2000    x1    Tubular adenoma     colon polyps    Type II or unspecified type diabetes mellitus without mention of complication, not stated as uncontrolled     Urinary incontinence     frequent UTI s/p infection, surgical dilatation lead to incontinence    Wears dentures     full on top, partial on bottom    Wears glasses       Past Surgical History:   Procedure Laterality Date    APPENDECTOMY  1962    BLADDER SURGERY      bladder stimulator    BLADDER SUSPENSION  2002    multiple    CARDIAC CATHETERIZATION  2008    no stents    CARDIOVASCULAR STRESS TEST  12/2014    CATARACT REMOVAL WITH IMPLANT Left 11/07/2017    [facility]    CATARACT REMOVAL WITH IMPLANT Right 11/28/2017    [facility]    COLON SURGERY      colostomy reversal    COLONOSCOPY  4/7/16    tubular adenoma x3; internal hemorrhoids    COLONOSCOPY N/A 11/6/2019    COLONOSCOPY DIAGNOSTIC performed by Chava Luque MD at [address] [place] [address]    bowel obstruction/ rupture from diverticular disease, reversal 3 mos later    CYSTOSCOPY  09/08/2017    W/ 200IU Botox     FRACTURE SURGERY Right 2011    hip    FRACTURE SURGERY Left 2001    WRIST    FRACTURE SURGERY Left 2013    ankle    HERNIA REPAIR      HYSTERECTOMY  1969    JOINT REPLACEMENT Bilateral 2000    BILAT KNEES    LUMBAR FUSION  2017    L2/L3  [place] XCAPSL CTRC RMVL INSJ IO LENS PROSTH W/O ECP Left 2017    EYE CATARACT EMULSIFICATION IOL IMPLANT performed by Jennifer Radford MD at [facility] W/O ECP Right 2017    EYE CATARACT EMULSIFICATION IOL IMPLANT performed by Jennifer Radford MD at [facility] Right 10/27/15    WITH POLY EXCHANGE BIOMET AND GPS APPLICATION    REVISION TOTAL KNEE ARTHROPLASTY Left 2020    KNEE TOTAL ARTHROPLASTY REVISION - POLY EXCHANGE performed by Katiuska Cota MD at [address]      fusion, did not take   [address]    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    NEAL AND BSO      TONSILLECTOMY      UPPER GASTROINTESTINAL ENDOSCOPY      UPPER GASTROINTESTINAL ENDOSCOPY  14    UPPER GASTROINTESTINAL ENDOSCOPY  2016    mild gastritis    UPPER GASTROINTESTINAL ENDOSCOPY N/A 2018    retained thick secretions in proximal esophagus       Family History   Problem Relation Age of Onset    Breast Cancer Mother     Cancer Mother         breast and uterine  39    Heart Disease Father     Heart Attack Father          28   [address] [facility] Maternal Grandmother     Cancer Brother 46        bronchogenic adenocarcinoma cancer    Lung Cancer Brother     Cancer Sister         lung    Lung Cancer Sister          72    Breast Cancer Sister          62    Cancer Sister         breast       Social History     Tobacco Use    Smoking status: Former Smoker     Packs/day: 0.50     Years: 20.00     Pack years: 10.00     Quit date: 1982     Years since quittin.6    Smokeless tobacco: Former User     Quit date: 1982   Substance Use Topics    Alcohol use: No      Current Outpatient Medications   Medication Sig Dispense Refill    ergocalciferol (ERGOCALCIFEROL) 1.25 MG (95916 UT) capsule Take 1 capsule by mouth once a week  benzonatate (TESSALON) 200 MG capsule Take 1 capsule by mouth as needed      albuterol (PROVENTIL) (5 MG/ML) 0.5% nebulizer solution Inhale 0.5 mLs into the lungs as needed      olmesartan (BENICAR) 5 MG tablet Take 1 tablet by mouth 2 times daily      lactulose (CHRONULAC) 10 GM/15ML solution Take 15 mLs by mouth 3 times daily as needed (constipation) 946 mL 1    nystatin (MYCOSTATIN) 771005 UNIT/GM powder Apply 3 times daily. 45 g 3    esomeprazole (NEXIUM) 40 MG delayed release capsule Take 1 capsule by mouth every morning (before breakfast) 90 capsule 3    hydrocortisone (ANUSOL-HC) 2.5 % CREA rectal cream Place rectally 2 times daily 28 g 3    irbesartan (AVAPRO) 75 MG tablet TAKE 1 TABLET BY MOUTH ONE TIME A DAY  30 tablet 1    vitamin B-12 (CYANOCOBALAMIN) 1000 MCG tablet Take 1 tablet by mouth once a week 30 tablet 3    tiZANidine (ZANAFLEX) 4 MG tablet Take 1 tablet by mouth 4 times daily as needed (Muscle spasms) 40 tablet 0    aspirin 81 MG EC tablet Take 1 tablet by mouth 2 times daily 30 tablet 3    nystatin-triamcinolone (MYCOLOG II) 190623-6.1 UNIT/GM-% cream Apply topically 4 times daily Apply topically 4 times daily.  30 g 3    rOPINIRole (REQUIP) 1 MG tablet Take by mouth nightly       glipiZIDE (GLUCOTROL) 5 MG tablet 2.5mg in the am and 2.5 mg in the pm      Continuous Blood Gluc Sensor (FREESTYLE DAVIS 14 DAY SENSOR) MISC       Salicylic Acid (COMPOUND W EX)       LIVALO 2 MG TABS tablet Take 2 mg by mouth nightly       Multiple Vitamins-Minerals (THERAPEUTIC MULTIVITAMIN-MINERALS) tablet Take 1 tablet by mouth daily      Multiple Vitamins-Minerals (PRESERVISION AREDS PO) Take by mouth daily       Continuous Blood Gluc  (FREESTYLE DAVIS 14 DAY READER) MATTHEW       donepezil (ARICEPT) 10 MG tablet Take 10 mg by mouth nightly  estradiol (ESTRACE VAGINAL) 0.1 MG/GM vaginal cream Place 1 g vaginally Twice a Week (Patient taking differently: Place 1 g vaginally every 14 days ) 1 Tube 5    FARXIGA 10 MG tablet TAKE 1 TABLET BY MOUTH IN THE MORNING  30 tablet 11    CRANBERRY PO Take by mouth 2 times daily       No current facility-administered medications for this visit. Facility-Administered Medications Ordered in Other Visits   Medication Dose Route Frequency Provider Last Rate Last Admin    0.9 % sodium chloride infusion 250 mL  250 mL Intravenous Once Gia Fischer MD         Allergies   Allergen Reactions    Iodides Anaphylaxis, Hives and Itching     \"Contrast dyes\"  This was added by someone but Patient states has had IVP dyes multiple times without problems and had a myelogram in Dec 2016 without problems    Povidone-Iodine Anaphylaxis, Hives and Swelling    Sulfa Antibiotics Hives and Swelling    Betadine [Povidone Iodine] Hives    Cephalexin Hives and Swelling    Cephalexin Itching     Other reaction(s): Hallucinations  In 1969 received demerol and keflex together so was told to list both as allergies    Cozaar [Losartan] Nausea Only     Pt also gets sores on toungue    Cymbalta [Duloxetine Hcl] Diarrhea and Nausea And Vomiting     Weakness    Demerol Hives and Swelling    Doxycycline Other (See Comments)     Sore mouth        Meperidine Itching     Other reaction(s): Hallucinations      Morphine Hives and Itching    Pcn [Penicillins] Hives, Swelling and Itching     Other reaction(s): Intolerance-unknown    Zithromax [Azithromycin] Other (See Comments)     Sore mouth      Clindamycin/Lincomycin     Etodolac     Fluorescein     Iodine      Other reaction(s):  Intolerance-unknown    Lisinopril      cough    Penicillin G     Soap     Toviaz [Fesoterodine Fumarate Er]     Metformin And Related Nausea And Vomiting     Diarrhea and N/V       Health Maintenance   Topic Date Due Tenderness: There is abdominal tenderness. Hernia: A hernia is present. Comments: Tender nonreducible hernia in the lower mid abdomen along the scar line   Genitourinary:     Comments: Swollen hemorrhoids around the whole rectum. The rectal area is reddened and so is inferior vulva. Digital rectal exam revealed some soft small to moderate amount of stool in the rectal vault. Musculoskeletal:      Right lower leg: No edema. Left lower leg: No edema. Lymphadenopathy:      Cervical: No cervical adenopathy. Skin:     General: Skin is warm and dry. Findings: No erythema. Neurological:      Mental Status: She is alert and oriented to person, place, and time. Cranial Nerves: No cranial nerve deficit. Psychiatric:         Mood and Affect: Mood normal.         Behavior: Behavior normal.         Thought Content: Thought content normal.         Assessment:       Diagnosis Orders   1. Memory loss     2. Essential hypertension     3. Chronic idiopathic constipation  XR ABDOMEN (KUB) (SINGLE AP VIEW)   4. Tinea corporis  nystatin (MYCOSTATIN) 632346 UNIT/GM powder   5. External hemorrhoids  hydrocortisone (ANUSOL-HC) 2.5 % CREA rectal cream        Plan:      No follow-ups on file. Orders Placed This Encounter   Procedures    XR ABDOMEN (KUB) (SINGLE AP VIEW)     Standing Status:   Future     Standing Expiration Date:   1/4/2022     Orders Placed This Encounter   Medications    lactulose (CHRONULAC) 10 GM/15ML solution     Sig: Take 15 mLs by mouth 3 times daily as needed (constipation)     Dispense:  946 mL     Refill:  1    nystatin (MYCOSTATIN) 654896 UNIT/GM powder     Sig: Apply 3 times daily.      Dispense:  45 g     Refill:  3    esomeprazole (NEXIUM) 40 MG delayed release capsule     Sig: Take 1 capsule by mouth every morning (before breakfast)     Dispense:  90 capsule     Refill:  3    hydrocortisone (ANUSOL-HC) 2.5 % CREA rectal cream     Sig: Place rectally 2 times daily Dispense:  28 g     Refill:  3       Patient given educational materials - see patient instructions. Discussed use, benefit, and side effects of prescribed medications. All patient questions answered. Pt voiced understanding. Reviewed health maintenance. Instructed to continue current medications, diet and exercise. Patient agreed with treatment plan. Follow up as directed.      Electronicallysigned by Keenan Gross MD on 1/4/2021 at 5:04 PM

## 2021-01-05 ENCOUNTER — HOSPITAL ENCOUNTER (OUTPATIENT)
Dept: GENERAL RADIOLOGY | Age: 81
Discharge: HOME | End: 2021-01-07
Payer: MEDICARE

## 2021-01-05 ENCOUNTER — HOSPITAL ENCOUNTER (OUTPATIENT)
Age: 81
Discharge: HOME | End: 2021-01-07
Payer: MEDICARE

## 2021-01-05 DIAGNOSIS — K59.04 CHRONIC IDIOPATHIC CONSTIPATION: ICD-10-CM

## 2021-01-05 PROCEDURE — 74018 RADEX ABDOMEN 1 VIEW: CPT

## 2021-01-07 ENCOUNTER — TELEPHONE (OUTPATIENT)
Dept: PRIMARY CARE CLINIC | Age: 81
End: 2021-01-07

## 2021-01-07 DIAGNOSIS — K64.9 HEMORRHOIDS, UNSPECIFIED HEMORRHOID TYPE: Primary | ICD-10-CM

## 2021-01-07 NOTE — TELEPHONE ENCOUNTER
Pt states she finally had a bowel movement today.  States it took 1 suppository, 3 doses of the lactulose and a can of prune juice

## 2021-01-11 ENCOUNTER — OFFICE VISIT (OUTPATIENT)
Dept: GASTROENTEROLOGY | Age: 81
End: 2021-01-11
Payer: MEDICARE

## 2021-01-11 VITALS
OXYGEN SATURATION: 97 % | TEMPERATURE: 97 F | HEIGHT: 59 IN | WEIGHT: 159.4 LBS | DIASTOLIC BLOOD PRESSURE: 60 MMHG | HEART RATE: 82 BPM | SYSTOLIC BLOOD PRESSURE: 123 MMHG | BODY MASS INDEX: 32.13 KG/M2

## 2021-01-11 DIAGNOSIS — K59.00 CONSTIPATION, UNSPECIFIED CONSTIPATION TYPE: Primary | ICD-10-CM

## 2021-01-11 DIAGNOSIS — R10.84 ABDOMINAL PAIN, GENERALIZED: ICD-10-CM

## 2021-01-11 PROCEDURE — G8417 CALC BMI ABV UP PARAM F/U: HCPCS | Performed by: INTERNAL MEDICINE

## 2021-01-11 PROCEDURE — 4040F PNEUMOC VAC/ADMIN/RCVD: CPT | Performed by: INTERNAL MEDICINE

## 2021-01-11 PROCEDURE — 99214 OFFICE O/P EST MOD 30 MIN: CPT | Performed by: INTERNAL MEDICINE

## 2021-01-11 PROCEDURE — G8484 FLU IMMUNIZE NO ADMIN: HCPCS | Performed by: INTERNAL MEDICINE

## 2021-01-11 PROCEDURE — 1090F PRES/ABSN URINE INCON ASSESS: CPT | Performed by: INTERNAL MEDICINE

## 2021-01-11 PROCEDURE — G8427 DOCREV CUR MEDS BY ELIG CLIN: HCPCS | Performed by: INTERNAL MEDICINE

## 2021-01-11 PROCEDURE — 1123F ACP DISCUSS/DSCN MKR DOCD: CPT | Performed by: INTERNAL MEDICINE

## 2021-01-11 PROCEDURE — G8400 PT W/DXA NO RESULTS DOC: HCPCS | Performed by: INTERNAL MEDICINE

## 2021-01-11 PROCEDURE — 1036F TOBACCO NON-USER: CPT | Performed by: INTERNAL MEDICINE

## 2021-01-11 ASSESSMENT — ENCOUNTER SYMPTOMS
ABDOMINAL PAIN: 1
VOICE CHANGE: 0
ANAL BLEEDING: 0
COUGH: 0
BLOOD IN STOOL: 0
APNEA: 0
TROUBLE SWALLOWING: 0
BACK PAIN: 1
SHORTNESS OF BREATH: 0
SORE THROAT: 0
ABDOMINAL DISTENTION: 1
NAUSEA: 0
RECTAL PAIN: 0
SINUS PAIN: 0
DIARRHEA: 0
CONSTIPATION: 1
WHEEZING: 0
VOMITING: 0
SINUS PRESSURE: 0
CHOKING: 0

## 2021-01-11 NOTE — PROGRESS NOTES
GI OFFICE FOLLOW UP    INTERVAL HISTORY:   Katlyn Jolly MD  [address],  [address]    Chief Complaint   Patient presents with    Abdominal Pain     Patient is here today as a new Patient for Dr. Stephen Preston. Patient is switching from DONNY Abraham. Patient is here today c/o RLQ. Patient states there id a hernia in this location. Patietn also states that is was impacted twice in a week. Lastly, patient states that she is c/o of hemorrhoids. No diagnosis found. HISTORY OF PRESENT ILLNESS: Ms.Mary Ravi Lo is a 79 y.o. female with a past history remarkable for ,     This is my first encounter with this patient. Patient see with the symptoms of constipation, abdominal pain. Patient states that she has chronic constipation at least the last 10 to 15 years. At present she does not able to move bowels for 5 to 7 days at a time. She strains at bowel movements. She has been taking stool softeners, laxatives with minimal benefit. Interestingly intermittently she has loose bowels. Patient states several years ago she had bowel rupture for which she had part of the colon removed. At that time she had a colostomy for a few months subsequently this was reversed. Chart reviewed. In number of 2019 patient had a colonoscopy done by Dr. Rena Torres, and I reviewed the report, and appears to be nonspecific. No strictures noted. She also was followed by Dr. Marco A Guevara for several years and she had a EGD done in 2018 no significant abnormalities noted except mild gastritis. She also has a past history of esophagitis. I did review her old records and noted Dr. Nikolai Figueroa notes. It appears that this patient has chronic constipation. May have slow transit constipation. Abdominal pain appears to be nonlocalized, crampy in nature.   Most of the time the pain is relieved by bowel movements. Has a history of mild abdominal bloating, generalized cramps. Noted this was. No melena or hematochezia. She has a fair appetite. No weight loss. No dysphagia. No nausea vomiting. No fever chills. She does have urinary symptoms and she does self-catheterization. Her recent labs including CMP, CBC nonspecific. Hemoglobin A1c is between 6.5 and 7. Last time patient was seen by Dr. Jennifer Gutierrez was about 2 years ago. Past Medical,Family, and Social History reviewed and does contribute to the patient presenting condition. Patient's PMH/PSH,SH,PSYCH Hx, MEDs, ALLERGIES, and ROS were all reviewed and updated in the appropriate sections. Yes      PAST MEDICAL HISTORY:  Past Medical History:   Diagnosis Date    Age-related cognitive decline     Ankle pain     Left ankle fracture in ortho boat.    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    left with urinary incontinenc post surgical    Caffeine use     2 coffee/day, 1-2 tea per week    Chronic back pain     Chronic ITP (idiopathic thrombocytopenia) (HCC)     CVA (cerebral infarction) 2008, 2010    balance issues, short term memory loss    Diabetic gastroparesis ([address]     Diverticular disease 1986    GERD (gastroesophageal reflux disease)     Hiatal hernia     Hip fracture ([address]     History of blood transfusion     History of decreased platelet count     Hyperlipemia     Hypertension     Internal hemorrhoid     Macular degeneration of left eye 1980's    S/P laser treatment    Nausea and vomiting     Neuropathy     Osteoarthritis     Ringing in ears     Self-catheterizes urinary bladder     6-7 times daily    TIA (transient ischemic attack) 2000    x1    Tubular adenoma     colon polyps    Type II or unspecified type diabetes mellitus without mention of complication, not stated as uncontrolled     Urinary incontinence     frequent UTI s/p infection, surgical dilatation lead to incontinence    Wears dentures     full on top, partial on bottom    Wears glasses        Past Surgical History:   Procedure Laterality Date    APPENDECTOMY  1962    BLADDER SURGERY      bladder stimulator    BLADDER SUSPENSION  2002    multiple    CARDIAC CATHETERIZATION  2008    no stents    CARDIOVASCULAR STRESS TEST  12/2014    CATARACT REMOVAL WITH IMPLANT Left 11/07/2017    [facility]    CATARACT REMOVAL WITH IMPLANT Right 11/28/2017    [facility]    COLON SURGERY      colostomy reversal    COLONOSCOPY  04/07/2016    tubular adenoma x3; internal hemorrhoids    COLONOSCOPY N/A 11/06/2019    COLONOSCOPY DIAGNOSTIC performed by Annam[facility] [address] [facility]  11/06/2019    COLOSTOMY  1986    bowel obstruction/ rupture from diverticular disease, reversal 3 mos later    CYSTOSCOPY  09/08/2017    W/ 200IU Botox     FRACTURE SURGERY Right 2011    hip    FRACTURE SURGERY Left 2001    [facility]    FRACTURE SURGERY Left 2013    ankle    HERNIA REPAIR      HYSTERECTOMY  1969    JOINT REPLACEMENT Bilateral 2000    BILAT KNEES    LUMBAR FUSION  2017    L2/L3    WA XCAPSL CTRC RMVL INSJ IO LENS PROSTH W/O ECP Left 11/07/2017    EYE CATARACT EMULSIFICATION IOL IMPLANT performed by Sandy Arteaga MD at [address] [facility] W/O ECP Right 11/28/2017    EYE CATARACT EMULSIFICATION IOL IMPLANT performed by Sandy [facility]aga MD at [facility] Right 10/27/2015    WITH POLY EXCHANGE BIOMET AND GPS APPLICATION    REVISION TOTAL KNEE ARTHROPLASTY Left 07/14/2020    KNEE TOTAL ARTHROPLASTY REVISION - POLY EXCHANGE performed by Harpreet Yeboah MD at [address] [facility]  2010    fusion, did not take   [address]    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    NEAL AND BSO      TONSILLECTOMY  1978    UPPER GASTROINTESTINAL ENDOSCOPY      UPPER GASTROINTESTINAL ENDOSCOPY  05/05/2014    UPPER GASTROINTESTINAL ENDOSCOPY  04/07/2016    mild gastritis    UPPER GASTROINTESTINAL ENDOSCOPY N/A 07/17/2018    retained thick secretions in proximal esophagus       CURRENT MEDICATIONS:    Current Outpatient Medications:     ergocalciferol (ERGOCALCIFEROL) 1.25 MG (97741 UT) capsule, Take 1 capsule by mouth once a week, Disp: , Rfl:     benzonatate (TESSALON) 200 MG capsule, Take 1 capsule by mouth as needed, Disp: , Rfl:     albuterol (PROVENTIL) (5 MG/ML) 0.5% nebulizer solution, Inhale 0.5 mLs into the lungs as needed, Disp: , Rfl:     olmesartan (BENICAR) 5 MG tablet, Take 1 tablet by mouth 2 times daily, Disp: , Rfl:     lactulose (CHRONULAC) 10 GM/15ML solution, Take 15 mLs by mouth 3 times daily as needed (constipation), Disp: 946 mL, Rfl: 1    nystatin (MYCOSTATIN) 874645 UNIT/GM powder, Apply 3 times daily. , Disp: 45 g, Rfl: 3    esomeprazole (NEXIUM) 40 MG delayed release capsule, Take 1 capsule by mouth every morning (before breakfast), Disp: 90 capsule, Rfl: 3    hydrocortisone (ANUSOL-HC) 2.5 % CREA rectal cream, Place rectally 2 times daily, Disp: 28 g, Rfl: 3    irbesartan (AVAPRO) 75 MG tablet, TAKE 1 TABLET BY MOUTH ONE TIME A DAY , Disp: 30 tablet, Rfl: 1    vitamin B-12 (CYANOCOBALAMIN) 1000 MCG tablet, Take 1 tablet by mouth once a week, Disp: 30 tablet, Rfl: 3    tiZANidine (ZANAFLEX) 4 MG tablet, Take 1 tablet by mouth 4 times daily as needed (Muscle spasms), Disp: 40 tablet, Rfl: 0    aspirin 81 MG EC tablet, Take 1 tablet by mouth 2 times daily, Disp: 30 tablet, Rfl: 3    nystatin-triamcinolone (MYCOLOG II) 302425-7.1 UNIT/GM-% cream, Apply topically 4 times daily Apply topically 4 times daily. , Disp: 30 g, Rfl: 3    rOPINIRole (REQUIP) 1 MG tablet, Take by mouth nightly , Disp: , Rfl:     glipiZIDE (GLUCOTROL) 5 MG tablet, 2.5mg in the am and 2.5 mg in the pm, Disp: , Rfl:     Continuous Blood Gluc Sensor (FREESTYLE DAVIS 14 DAY SENSOR) MISC, , Disp: , Rfl:     Salicylic Acid (COMPOUND W EX), , Disp: , Rfl:     LIVALO 2 MG TABS tablet, Take 2 mg by mouth nightly , Disp: , Rfl:     Multiple Vitamins-Minerals (THERAPEUTIC MULTIVITAMIN-MINERALS) tablet, Take 1 tablet by mouth daily, Disp: , Rfl:     Multiple Vitamins-Minerals (PRESERVISION AREDS PO), Take by mouth daily , Disp: , Rfl:     Continuous Blood Gluc  (FREESTYLE DAVIS 14 DAY READER) MATTHEW, , Disp: , Rfl:     donepezil (ARICEPT) 10 MG tablet, Take 10 mg by mouth nightly , Disp: , Rfl:     estradiol (ESTRACE VAGINAL) 0.1 MG/GM vaginal cream, Place 1 g vaginally Twice a Week (Patient taking differently: Place 1 g vaginally every 14 days ), Disp: 1 Tube, Rfl: 5    FARXIGA 10 MG tablet, TAKE 1 TABLET BY MOUTH IN THE MORNING , Disp: 30 tablet, Rfl: 11    CRANBERRY PO, Take by mouth 2 times daily, Disp: , Rfl:     ALLERGIES:   Allergies   Allergen Reactions    Iodides Anaphylaxis, Hives and Itching     \"Contrast dyes\"  This was added by someone but Patient states has had IVP dyes multiple times without problems and had a myelogram in Dec 2016 without problems    Povidone-Iodine Anaphylaxis, Hives and Swelling    Sulfa Antibiotics Hives and Swelling    Betadine [Povidone Iodine] Hives    Cephalexin Hives and Swelling    Cephalexin Itching     Other reaction(s): Hallucinations  In 1969 received demerol and keflex together so was told to list both as allergies    Cozaar [Losartan] Nausea Only     Pt also gets sores on toungue    Cymbalta [Duloxetine Hcl] Diarrhea and Nausea And Vomiting     Weakness    Demerol Hives and Swelling    Doxycycline Other (See Comments)     Sore mouth        Meperidine Itching     Other reaction(s): Hallucinations      Morphine Hives and Itching    Pcn [Penicillins] Hives, Swelling and Itching     Other reaction(s):  Intolerance-unknown    Zithromax [Azithromycin] Other (See Comments)     Sore mouth      Clindamycin/Lincomycin     Etodolac     Fluorescein     Iodine      Other reaction(s):  Intolerance-unknown    Lisinopril      cough    Penicillin G     Soap     Toviaz [Fesoterodine Fumarate Er]     Metformin And Related Nausea And Vomiting     Diarrhea and N/V       FAMILY HISTORY:       Problem Relation Age of Onset    Breast Cancer Mother     Cancer Mother         breast and uterine  39    Heart Disease Father     Heart Attack Father          28   Jennifer Helms Maternal Grandmother     Cancer Brother 46        bronchogenic adenocarcinoma cancer    Lung Cancer Brother     Cancer Sister         lung    Lung Cancer Sister          72    Breast Cancer Sister          62    Cancer Sister         breast         SOCIAL HISTORY:   Social History     Socioeconomic History    Marital status:      Spouse name: Not on file    Number of children: Not on file    Years of education: Not on file    Highest education level: Not on file   Occupational History    Occupation: retired   Social Needs    Financial resource strain: Not very hard    Food insecurity     Worry: Never true     Inability: Never true    Transportation needs     Medical: No     Non-medical: No   Tobacco Use    Smoking status: Former Smoker     Packs/day: 0.50     Years: 20.00     Pack years: 10.00     Quit date: 1982     Years since quittin.6    Smokeless tobacco: Former User     Quit date: 1982   Substance and Sexual Activity    Alcohol use: No    Drug use: No    Sexual activity: Not on file   Lifestyle    Physical activity     Days per week: Not on file     Minutes per session: Not on file    Stress: Not on file   Relationships    Social connections     Talks on phone: Not on file     Gets together: Not on file     Attends Sabianist service: Not on file     Active member of club or organization: Not on file     Attends meetings of clubs or organizations: Not on file Relationship status: Not on file    Intimate partner violence     Fear of current or ex partner: Not on file     Emotionally abused: Not on file     Physically abused: Not on file     Forced sexual activity: Not on file   Other Topics Concern    Not on file   Social History Narrative    Not on file         REVIEW OF SYSTEMS:         Review of Systems   Constitutional: Positive for fatigue. Negative for appetite change and unexpected weight change. HENT: Negative for postnasal drip, sinus pressure, sinus pain, sore throat, trouble swallowing and voice change. Eyes: Positive for visual disturbance (Glasses). Respiratory: Negative for apnea, cough, choking, shortness of breath and wheezing. Gastrointestinal: Positive for abdominal distention, abdominal pain and constipation. Negative for anal bleeding, blood in stool, diarrhea, nausea, rectal pain and vomiting. Genitourinary: Negative for difficulty urinating. Musculoskeletal: Positive for arthralgias, back pain (lower), gait problem (cane) and myalgias. Neurological: Positive for headaches. Negative for dizziness, weakness, light-headedness and numbness. Hematological: Bruises/bleeds easily. Psychiatric/Behavioral: Negative for sleep disturbance. The patient is nervous/anxious. PHYSICAL EXAMINATION: \    Vital signs reviewed per the nursing documentation. Temp 97 °F (36.1 °C)   Ht 4' 11\" (1.499 m)   Wt 159 lb 6.4 oz (72.3 kg)   BMI 32.19 kg/m²   Body mass index is 32.19 kg/m². Physical Exam  Vitals signs and nursing note reviewed. Constitutional:       Appearance: She is well-developed. HENT:      Head: Normocephalic and atraumatic. Eyes:      General: No scleral icterus. Conjunctiva/sclera: Conjunctivae normal.      Pupils: Pupils are equal, round, and reactive to light. Neck:      Musculoskeletal: Normal range of motion and neck supple. Thyroid: No thyromegaly. Vascular: No hepatojugular reflux or JVD. Trachea: No tracheal deviation. Cardiovascular:      Rate and Rhythm: Normal rate and regular rhythm. Heart sounds: Normal heart sounds. Pulmonary:      Effort: Pulmonary effort is normal. No respiratory distress. Breath sounds: Normal breath sounds. No wheezing or rales. Abdominal:      General: Bowel sounds are normal. There is no distension. Palpations: Abdomen is soft. There is no hepatomegaly or mass. Tenderness: There is no abdominal tenderness. There is no rebound. Hernia: No hernia is present. Genitourinary:     Rectum: Guaiac result negative. Musculoskeletal:         General: No tenderness. Comments: No joint swelling   Lymphadenopathy:      Cervical: No cervical adenopathy. Skin:     General: Skin is warm. Findings: No bruising, ecchymosis, erythema or rash. Neurological:      Mental Status: She is alert and oriented to person, place, and time. Cranial Nerves: No cranial nerve deficit. Psychiatric:         Thought Content:  Thought content normal.           LABORATORY DATA: Reviewed  Lab Results   Component Value Date    WBC 7.6 12/10/2020    HGB 13.7 12/10/2020    HCT 44.2 12/10/2020    MCV 95.3 12/10/2020    PLT See Reflexed IPF Result 12/10/2020     12/10/2020    K 4.0 12/10/2020     (H) 12/10/2020    CO2 17 (L) 12/10/2020    BUN 17 12/10/2020    CREATININE 0.77 12/10/2020    LABPROT 6.8 11/19/2012    LABALBU 4.1 12/10/2020    BILITOT 0.62 12/10/2020    ALKPHOS 73 12/10/2020    AST 19 12/10/2020    ALT 18 12/10/2020    INR 1.0 02/21/2017         Lab Results   Component Value Date    RBC 4.64 12/10/2020    HGB 13.7 12/10/2020    MCV 95.3 12/10/2020    MCH 29.5 12/10/2020    MCHC 31.0 12/10/2020    RDW 14.1 12/10/2020    MPV NOT REPORTED 12/10/2020    BASOPCT 1 06/30/2020    LYMPHSABS 1.22 06/30/2020    MONOSABS 0.46 06/30/2020    NEUTROABS 3.17 06/30/2020    EOSABS 0.20 06/30/2020    BASOSABS 0.05 06/30/2020         DIAGNOSTIC TESTING: Xr Abdomen (kub) (single Ap View)    Result Date: 1/5/2021  EXAMINATION: ONE SUPINE XRAY VIEW(S) OF THE ABDOMEN 1/5/2021 10:44 am COMPARISON: CT abdomen and pelvis November 1, 2019 HISTORY: ORDERING SYSTEM PROVIDED HISTORY: Chronic idiopathic constipation TECHNOLOGIST PROVIDED HISTORY: Reason for Exam: Chronic idiopathic constipation Acuity: Unknown Type of Exam: Unknown FINDINGS: Nerve stimulators overlie the sacrum. Lumbar decompression and fusion. Right hip fixation. Large stool volume. No abnormally dilated loops of small bowel. Lung bases are clear. Large stool volume. Assessment  No diagnosis found. Plan  This patient appears to have chronic constipation. She is not on narcotics etc. that can account for this. I have a feeling whether she has constipation predominant IBS. I did explain the patient regarding above possibility and reassured. Brochures given. At present patient may need to golf high-fiber diet and intermittent oral laxatives. I did give brochures regarding this. Also advised to continue high-fiber diet. Advised to see in the next 3 to 4 months. Thank you for allowing me to participate in the care of Ms. Isidro Dixon. For any further questions please do not hesitate to contact me. I have reviewed and agree with the ROS entered by the MA/LPN. Note is dictated utilizing voice recognition software. Unfortunately this leads to occasional typographical errors.  Please contact our office if you have any questions        Daniel Tijerina MD,FACP, [facility]  Board Certified in Gastroenterology and [address] [place] [address] Gastroenterology  Office #: [phone]

## 2021-01-22 ENCOUNTER — OFFICE VISIT (OUTPATIENT)
Dept: PRIMARY CARE CLINIC | Age: 81
End: 2021-01-22
Payer: MEDICARE

## 2021-01-22 VITALS
DIASTOLIC BLOOD PRESSURE: 80 MMHG | BODY MASS INDEX: 32.48 KG/M2 | OXYGEN SATURATION: 96 % | HEART RATE: 75 BPM | SYSTOLIC BLOOD PRESSURE: 130 MMHG | WEIGHT: 160.8 LBS | TEMPERATURE: 96.8 F

## 2021-01-22 DIAGNOSIS — N39.41 URGE INCONTINENCE: Primary | ICD-10-CM

## 2021-01-22 DIAGNOSIS — B37.2 YEAST DERMATITIS: ICD-10-CM

## 2021-01-22 PROCEDURE — 1036F TOBACCO NON-USER: CPT | Performed by: FAMILY MEDICINE

## 2021-01-22 PROCEDURE — 99213 OFFICE O/P EST LOW 20 MIN: CPT | Performed by: FAMILY MEDICINE

## 2021-01-22 PROCEDURE — G8400 PT W/DXA NO RESULTS DOC: HCPCS | Performed by: FAMILY MEDICINE

## 2021-01-22 PROCEDURE — G8484 FLU IMMUNIZE NO ADMIN: HCPCS | Performed by: FAMILY MEDICINE

## 2021-01-22 PROCEDURE — G8417 CALC BMI ABV UP PARAM F/U: HCPCS | Performed by: FAMILY MEDICINE

## 2021-01-22 PROCEDURE — 1090F PRES/ABSN URINE INCON ASSESS: CPT | Performed by: FAMILY MEDICINE

## 2021-01-22 PROCEDURE — 0509F URINE INCON PLAN DOCD: CPT | Performed by: FAMILY MEDICINE

## 2021-01-22 PROCEDURE — 4040F PNEUMOC VAC/ADMIN/RCVD: CPT | Performed by: FAMILY MEDICINE

## 2021-01-22 PROCEDURE — G8427 DOCREV CUR MEDS BY ELIG CLIN: HCPCS | Performed by: FAMILY MEDICINE

## 2021-01-22 PROCEDURE — 1123F ACP DISCUSS/DSCN MKR DOCD: CPT | Performed by: FAMILY MEDICINE

## 2021-01-22 RX ORDER — FLUCONAZOLE 150 MG/1
150 TABLET ORAL DAILY
Qty: 7 TABLET | Refills: 0 | Status: SHIPPED | OUTPATIENT
Start: 2021-01-22 | End: 2021-01-29

## 2021-01-22 RX ORDER — POLYETHYLENE GLYCOL 3350 17 G/17G
17 POWDER, FOR SOLUTION ORAL DAILY
COMMUNITY
End: 2021-08-19

## 2021-01-22 ASSESSMENT — ENCOUNTER SYMPTOMS: SHORTNESS OF BREATH: 0

## 2021-01-22 NOTE — PROGRESS NOTES
transfusion     History of decreased platelet count     Hyperlipemia     Hypertension     Internal hemorrhoid     Macular degeneration of left eye 1980's    S/P laser treatment    Nausea and vomiting     Neuropathy     Osteoarthritis     Ringing in ears     Self-catheterizes urinary bladder     6-7 times daily    TIA (transient ischemic attack) 2000    x1    Tubular adenoma     colon polyps    Type II or unspecified type diabetes mellitus without mention of complication, not stated as uncontrolled     Urinary incontinence     frequent UTI s/p infection, surgical dilatation lead to incontinence    Wears dentures     full on top, partial on bottom    Wears glasses       Past Surgical History:   Procedure Laterality Date    APPENDECTOMY  1962    BLADDER SURGERY      bladder stimulator    BLADDER SUSPENSION  2002    multiple    CARDIAC CATHETERIZATION  2008    no stents    CARDIOVASCULAR STRESS TEST  12/2014    CATARACT REMOVAL WITH IMPLANT Left 11/07/2017    [facility]    CATARACT REMOVAL WITH IMPLANT Right 11/28/2017    [facility]    COLON SURGERY      colostomy reversal    COLONOSCOPY  04/07/2016    tubular adenoma x3; internal hemorrhoids    COLONOSCOPY N/A 11/06/2019    COLONOSCOPY DIAGNOSTIC performed by Maxim Landis MD at [address] [place] [address]  11/06/2019    COLOSTOMY  1986    bowel obstruction/ rupture from diverticular disease, reversal 3 mos later    CYSTOSCOPY  09/08/2017    W/ 200IU Botox     FRACTURE SURGERY Right 2011    hip    FRACTURE SURGERY Left 2001    WRIST    FRACTURE SURGERY Left 2013    ankle    HERNIA REPAIR      HYSTERECTOMY  1969    JOINT REPLACEMENT Bilateral 2000    BILAT KNEES    LUMBAR FUSION  2017    L2/L3    NC XCAPSL CTRC RMVL INSJ IO LENS PROSTH W/O ECP Left 11/07/2017    EYE CATARACT EMULSIFICATION IOL IMPLANT performed by Joss Victor MD at [facility] 60 RMVL INSJ IO LENS PROSTH W/O ECP Right 2017    EYE CATARACT EMULSIFICATION IOL IMPLANT performed by Magdiel Faulkner MD at [facility] Right 10/27/2015    WITH POLY EXCHANGE BIOMET AND GPS APPLICATION    REVISION TOTAL KNEE ARTHROPLASTY Left 2020    KNEE TOTAL ARTHROPLASTY REVISION - POLY EXCHANGE performed by Berenice Tracy MD at [address]  2010    fusion, did not take   [address]    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 Maternal Grandmother     Cancer Brother 46        bronchogenic adenocarcinoma cancer    Lung Cancer Brother     Cancer Sister         lung    Lung Cancer Sister          72    Breast Cancer Sister          62    Cancer Sister         breast       Social History     Tobacco Use    Smoking status: Former Smoker     Packs/day: 0.50     Years: 20.00     Pack years: 10.00     Quit date: 1982     Years since quittin.6    Smokeless tobacco: Former User     Quit date: 1982   Substance Use Topics    Alcohol use: No      Current Outpatient Medications   Medication Sig Dispense Refill    polyethylene glycol (GLYCOLAX) 17 GM/SCOOP powder Take 17 g by mouth daily 2 tabs every morning      Wheat Dextrin (BENEFIBER DRINK MIX PO) Take by mouth 2 tablespoons every night      fluconazole (DIFLUCAN) 150 MG tablet Take 1 tablet by mouth daily for 7 days 7 tablet 0    ergocalciferol (ERGOCALCIFEROL) 1.25 MG (62572 UT) capsule Take 1 capsule by mouth once a week      albuterol (PROVENTIL) (5 MG/ML) 0.5% nebulizer solution Inhale 0.5 mLs into the lungs as needed      olmesartan (BENICAR) 5 MG tablet Take 1 tablet by mouth 2 times daily      nystatin (MYCOSTATIN) 986136 UNIT/GM powder Apply 3 times daily. 45 g 3    esomeprazole (NEXIUM) 40 MG delayed release capsule Take 1 capsule by mouth every morning (before breakfast) 90 capsule 3    hydrocortisone (ANUSOL-HC) 2.5 % CREA rectal cream Place rectally 2 times daily 28 g 3    irbesartan (AVAPRO) 75 MG tablet TAKE 1 TABLET BY MOUTH ONE TIME A DAY  30 tablet 1    vitamin B-12 (CYANOCOBALAMIN) 1000 MCG tablet Take 1 tablet by mouth once a week 30 tablet 3    tiZANidine (ZANAFLEX) 4 MG tablet Take 1 tablet by mouth 4 times daily as needed (Muscle spasms) 40 tablet 0    aspirin 81 MG EC tablet Take 1 tablet by mouth 2 times daily 30 tablet 3    nystatin-triamcinolone (MYCOLOG II) 197446-2.1 UNIT/GM-% cream Apply topically 4 times daily Apply topically 4 times daily.  30 g 3    rOPINIRole (REQUIP) 1 MG tablet Take by mouth nightly       glipiZIDE (GLUCOTROL) 5 MG tablet 2.5mg in the am and 2.5 mg in the pm      Continuous Blood Gluc Sensor (FREESTYLE DAVIS 14 DAY SENSOR) MISC       Salicylic Acid (COMPOUND W EX)       LIVALO 2 MG TABS tablet Take 2 mg by mouth nightly       Multiple Vitamins-Minerals (THERAPEUTIC MULTIVITAMIN-MINERALS) tablet Take 1 tablet by mouth daily      Multiple Vitamins-Minerals (PRESERVISION AREDS PO) Take by mouth daily       Continuous Blood Gluc  (FREESTYLE DAVIS 14 DAY READER) MATTHEW       donepezil (ARICEPT) 10 MG tablet Take 10 mg by mouth nightly       estradiol (ESTRACE VAGINAL) 0.1 MG/GM vaginal cream Place 1 g vaginally Twice a Week (Patient taking differently: Place 1 g vaginally every 14 days ) 1 Tube 5    FARXIGA 10 MG tablet TAKE 1 TABLET BY MOUTH IN THE MORNING  30 tablet 11    CRANBERRY PO Take by mouth 2 times daily      benzonatate (TESSALON) 200 MG capsule Take 1 capsule by mouth as needed      lactulose (CHRONULAC) 10 GM/15ML solution Take 15 mLs by mouth 3 times daily as needed (constipation) (Patient not taking: Reported on 1/22/2021) 946 mL 1     No current facility-administered medications for this visit. Facility-Administered Medications Ordered in Other Visits   Medication Dose Route Frequency Provider Last Rate Last Admin    0.9 % sodium chloride infusion 250 mL  250 mL Intravenous Once Jluis Gray MD         Allergies   Allergen Reactions    Iodides Anaphylaxis, Hives and Itching     \"Contrast dyes\"  This was added by someone but Patient states has had IVP dyes multiple times without problems and had a myelogram in Dec 2016 without problems    Povidone-Iodine Anaphylaxis, Hives and Swelling    Sulfa Antibiotics Hives and Swelling    Betadine [Povidone Iodine] Hives    Cephalexin Hives and Swelling    Cephalexin Itching     Other reaction(s): Hallucinations  In 1969 received demerol and keflex together so was told to list both as allergies    Cozaar [Losartan] Nausea Only     Pt also gets sores on toungue    Cymbalta [Duloxetine Hcl] Diarrhea and Nausea And Vomiting     Weakness    Demerol Hives and Swelling    Doxycycline Other (See Comments)     Sore mouth        Meperidine Itching     Other reaction(s): Hallucinations      Morphine Hives and Itching    Pcn [Penicillins] Hives, Swelling and Itching     Other reaction(s): Intolerance-unknown    Zithromax [Azithromycin] Other (See Comments)     Sore mouth      Clindamycin/Lincomycin     Etodolac     Fluorescein     Iodine      Other reaction(s):  Intolerance-unknown    Lisinopril      cough    Penicillin G     Soap     Toviaz [Fesoterodine Fumarate Er]     Metformin And Related Nausea And Vomiting     Diarrhea and N/V       Health Maintenance   Topic Date Due    Hepatitis C screen  1940   Georgianna Olszewski Annual Wellness Visit (AWV)  06/19/2019    DTaP/Tdap/Td vaccine (1 - Tdap) 01/09/2022 (Originally 8/12/1959)    Lipid screen  07/06/2021    Potassium monitoring  12/10/2021    Creatinine monitoring  12/10/2021    Flu vaccine  Completed    Shingles Vaccine  Completed    Pneumococcal 65+ years Vaccine  Completed    DEXA (modify frequency per FRAX score)  Addressed    Hepatitis A vaccine  Aged Out    Hib vaccine  Aged Out    Meningococcal (ACWY) vaccine  Aged Out       Subjective:      Review of Systems   Constitutional: Negative for chills and fever. Respiratory: Negative for shortness of breath. Cardiovascular: Negative for chest pain and palpitations. Neurological: Negative for dizziness, light-headedness and headaches. Objective:     BP (!) 146/82   Pulse 75   Temp 96.8 °F (36 °C)   Wt 160 lb 12.8 oz (72.9 kg)   SpO2 96%   BMI 32.48 kg/m²   Physical Exam  Skin:     Comments: Yeast rash in abd folds. Assessment:       Diagnosis Orders   1. Urge incontinence     2. Yeast dermatitis          Plan:    put cream on at night and powder during the day. Return if symptoms worsen or fail to improve. No orders of the defined types were placed in this encounter. Orders Placed This Encounter   Medications    fluconazole (DIFLUCAN) 150 MG tablet     Sig: Take 1 tablet by mouth daily for 7 days     Dispense:  7 tablet     Refill:  0       Patient given educationalmaterials - see patient instructions. Discussed use, benefit, and side effectsof prescribed medications. All patient questions answered. Pt voiced understanding. Reviewed health maintenance. Instructed to continue current medications, diet andexercise. Patient agreed with treatment plan. Follow up as directed.      Electronicallysigned by Dakotah Henderson MD on 1/22/2021 at 2:55 PM

## 2021-02-03 ENCOUNTER — OFFICE VISIT (OUTPATIENT)
Dept: ORTHOPEDIC SURGERY | Age: 81
End: 2021-02-03
Payer: MEDICARE

## 2021-02-03 DIAGNOSIS — Z96.659 HISTORY OF REVISION OF TOTAL KNEE ARTHROPLASTY: Primary | ICD-10-CM

## 2021-02-03 PROCEDURE — G8417 CALC BMI ABV UP PARAM F/U: HCPCS | Performed by: ORTHOPAEDIC SURGERY

## 2021-02-03 PROCEDURE — 4040F PNEUMOC VAC/ADMIN/RCVD: CPT | Performed by: ORTHOPAEDIC SURGERY

## 2021-02-03 PROCEDURE — G8400 PT W/DXA NO RESULTS DOC: HCPCS | Performed by: ORTHOPAEDIC SURGERY

## 2021-02-03 PROCEDURE — 1090F PRES/ABSN URINE INCON ASSESS: CPT | Performed by: ORTHOPAEDIC SURGERY

## 2021-02-03 PROCEDURE — 99213 OFFICE O/P EST LOW 20 MIN: CPT | Performed by: ORTHOPAEDIC SURGERY

## 2021-02-03 PROCEDURE — 1036F TOBACCO NON-USER: CPT | Performed by: ORTHOPAEDIC SURGERY

## 2021-02-03 PROCEDURE — 1123F ACP DISCUSS/DSCN MKR DOCD: CPT | Performed by: ORTHOPAEDIC SURGERY

## 2021-02-03 PROCEDURE — G8484 FLU IMMUNIZE NO ADMIN: HCPCS | Performed by: ORTHOPAEDIC SURGERY

## 2021-02-03 PROCEDURE — G8428 CUR MEDS NOT DOCUMENT: HCPCS | Performed by: ORTHOPAEDIC SURGERY

## 2021-02-03 RX ORDER — IRBESARTAN 75 MG/1
75 TABLET ORAL DAILY
Qty: 90 TABLET | Refills: 1 | Status: SHIPPED | OUTPATIENT
Start: 2021-02-03 | End: 2021-06-16

## 2021-02-03 NOTE — PROGRESS NOTES
Francisco J Gardiner M.D.            [address] [facility] [address] [facility], [address] [facility]           Dept Phone: [phone]           Dept Fax:  [address]           [place]          Dept Phone: [phone]           Dept Fax:  [phone]      Chief Compliant:  Chief Complaint   Patient presents with    Pain     Lt TKA 7/20        History of Present Illness: This is a 79 y.o. female who presents to the clinic today for evaluation / follow up of left total knee polyethylene revision on 7/14/2020. Patient has been following up with Esdras Malagon a couple times. She then had to take care of her  admits to motion of 2020 with the Covid crisis. She really was not able to do much in regards to her physical therapy. She states that her knee definitely feels better and feels more stable. Her major complaint however has to do with her left foot. She has difficulty putting her shoe on and off and has and moderate pain. She denies any fall or trauma to it. .       Review of Systems   Constitutional: Negative for fever, chills, sweats. Eyes: Negative for changes in vision, or pain. HENT: Negative for ear ache, epistaxis, or sore throat. Respiratory/Cardio: Negative for Chest pain, palpitations, SOB, or cough. Gastrointestinal: Negative for abdominal pain, N/V/D. Genitourinary: Negative for dysuria, frequency, urgency, or hematuria. Neurological: Negative for headache, numbness, or weakness. Integumentary: Negative for rash, itching, laceration, or abrasion. Musculoskeletal: Positive for Pain (Lt TKA 7/20)       Physical Exam:  Constitutional: Patient is oriented to person, place, and time. Patient appears well-developed and well nourished.    HENT: Negative otherwise noted  Head: Normocephalic and Atraumatic  Nose: Normal Eyes: Conjunctivae and EOM are normal  Neck: Normal range of motion Neck supple. Respiratory/Cardio: Effort normal. No respiratory distress. Musculoskeletal: Examination the patient's left knee know that she has good motion 0-130 degrees. She does have good stability to varus and valgus stressing throughout. She has decent strength although can be improved. I did not examine the patient's left foot and will defer this to Dr. Delvin Uribe    Neurological: Patient is alert and oriented to person, place, and time. Normal strenght. No sensory deficit. Skin: Skin is warm and dry  Psychiatric: Behavior is normal. Thought content normal.  Nursing note and vitals reviewed. Labs and Imaging:     XR taken today:  No results found. None taken today     No orders of the defined types were placed in this encounter. Assessment and Plan:  1. History of revision of total knee arthroplasty            This is a 79 y.o. female who presents to the clinic today for evaluation / follow up of status post polyethylene revision left total knee for wear doing better.      Past History:    Current Outpatient Medications:     polyethylene glycol (GLYCOLAX) 17 GM/SCOOP powder, Take 17 g by mouth daily 2 tabs every morning, Disp: , Rfl:     Wheat Dextrin (BENEFIBER DRINK MIX PO), Take by mouth 2 tablespoons every night, Disp: , Rfl:     ergocalciferol (ERGOCALCIFEROL) 1.25 MG (47132 UT) capsule, Take 1 capsule by mouth once a week, Disp: , Rfl:     benzonatate (TESSALON) 200 MG capsule, Take 1 capsule by mouth as needed, Disp: , Rfl:     albuterol (PROVENTIL) (5 MG/ML) 0.5% nebulizer solution, Inhale 0.5 mLs into the lungs as needed, Disp: , Rfl:     olmesartan (BENICAR) 5 MG tablet, Take 1 tablet by mouth 2 times daily, Disp: , Rfl:     lactulose (CHRONULAC) 10 GM/15ML solution, Take 15 mLs by mouth 3 times daily as needed (constipation) (Patient not taking: Reported on 1/22/2021), Disp: 946 mL, Rfl: 1   nystatin (MYCOSTATIN) 525768 UNIT/GM powder, Apply 3 times daily. , Disp: 45 g, Rfl: 3    esomeprazole (NEXIUM) 40 MG delayed release capsule, Take 1 capsule by mouth every morning (before breakfast), Disp: 90 capsule, Rfl: 3    hydrocortisone (ANUSOL-HC) 2.5 % CREA rectal cream, Place rectally 2 times daily, Disp: 28 g, Rfl: 3    irbesartan (AVAPRO) 75 MG tablet, TAKE 1 TABLET BY MOUTH ONE TIME A DAY , Disp: 30 tablet, Rfl: 1    vitamin B-12 (CYANOCOBALAMIN) 1000 MCG tablet, Take 1 tablet by mouth once a week, Disp: 30 tablet, Rfl: 3    tiZANidine (ZANAFLEX) 4 MG tablet, Take 1 tablet by mouth 4 times daily as needed (Muscle spasms), Disp: 40 tablet, Rfl: 0    aspirin 81 MG EC tablet, Take 1 tablet by mouth 2 times daily, Disp: 30 tablet, Rfl: 3    nystatin-triamcinolone (MYCOLOG II) 366072-5.1 UNIT/GM-% cream, Apply topically 4 times daily Apply topically 4 times daily. , Disp: 30 g, Rfl: 3    rOPINIRole (REQUIP) 1 MG tablet, Take by mouth nightly , Disp: , Rfl:     glipiZIDE (GLUCOTROL) 5 MG tablet, 2.5mg in the am and 2.5 mg in the pm, Disp: , Rfl:     Continuous Blood Gluc Sensor (FREESTYLE DAVIS 14 DAY SENSOR) MISC, , Disp: , Rfl:     Salicylic Acid (COMPOUND W EX), , Disp: , Rfl:     LIVALO 2 MG TABS tablet, Take 2 mg by mouth nightly , Disp: , Rfl:     Multiple Vitamins-Minerals (THERAPEUTIC MULTIVITAMIN-MINERALS) tablet, Take 1 tablet by mouth daily, Disp: , Rfl:     Multiple Vitamins-Minerals (PRESERVISION AREDS PO), Take by mouth daily , Disp: , Rfl:     Continuous Blood Gluc  (FREESTYLE DAVIS 14 DAY READER) MATTHEW, , Disp: , Rfl:     donepezil (ARICEPT) 10 MG tablet, Take 10 mg by mouth nightly , Disp: , Rfl:     estradiol (ESTRACE VAGINAL) 0.1 MG/GM vaginal cream, Place 1 g vaginally Twice a Week (Patient taking differently: Place 1 g vaginally every 14 days ), Disp: 1 Tube, Rfl: 5    FARXIGA 10 MG tablet, TAKE 1 TABLET BY MOUTH IN THE MORNING , Disp: 30 tablet, Rfl: 11   CRANBERRY PO, Take by mouth 2 times daily, Disp: , Rfl:   Allergies   Allergen Reactions    Iodides Anaphylaxis, Hives and Itching     \"Contrast dyes\"  This was added by someone but Patient states has had IVP dyes multiple times without problems and had a myelogram in Dec 2016 without problems    Povidone-Iodine Anaphylaxis, Hives and Swelling    Sulfa Antibiotics Hives and Swelling    Betadine [Povidone Iodine] Hives    Cephalexin Hives and Swelling    Cephalexin Itching     Other reaction(s): Hallucinations  In 1969 received demerol and keflex together so was told to list both as allergies    Cozaar [Losartan] Nausea Only     Pt also gets sores on toungue    Cymbalta [Duloxetine Hcl] Diarrhea and Nausea And Vomiting     Weakness    Demerol Hives and Swelling    Doxycycline Other (See Comments)     Sore mouth        Meperidine Itching     Other reaction(s): Hallucinations      Morphine Hives and Itching    Pcn [Penicillins] Hives, Swelling and Itching     Other reaction(s): Intolerance-unknown    Zithromax [Azithromycin] Other (See Comments)     Sore mouth      Clindamycin/Lincomycin     Etodolac     Fluorescein     Iodine      Other reaction(s):  Intolerance-unknown    Lisinopril      cough    Penicillin G     Soap     Toviaz [Fesoterodine Fumarate Er]     Metformin And Related Nausea And Vomiting     Diarrhea and N/V     Social History     Socioeconomic History    Marital status:      Spouse name: Not on file    Number of children: Not on file    Years of education: Not on file    Highest education level: Not on file   Occupational History    Occupation: retired   Social Needs    Financial resource strain: Not very hard    Food insecurity     Worry: Never true     Inability: Never true    Transportation needs     Medical: No     Non-medical: No   Tobacco Use    Smoking status: Former Smoker     Packs/day: 0.50     Years: 20.00     Pack years: 10.00 Quit date: 1982     Years since quittin.7    Smokeless tobacco: Former User     Quit date: 1982   Substance and Sexual Activity    Alcohol use: No    Drug use: No    Sexual activity: Not on file   Lifestyle    Physical activity     Days per week: Not on file     Minutes per session: Not on file    Stress: Not on file   Relationships    Social connections     Talks on phone: Not on file     Gets together: Not on file     Attends [employer] service: Not on file     Active member of club or organization: Not on file     Attends meetings of clubs or organizations: Not on file     Relationship status: Not on file    Intimate partner violence     Fear of current or ex partner: Not on file     Emotionally abused: Not on file     Physically abused: Not on file     Forced sexual activity: Not on file   Other Topics Concern    Not on file   Social History Narrative    Not on file     Past Medical History:   Diagnosis Date    Age-related cognitive decline     Ankle pain     Left ankle fracture in ortho boat.    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 urinary incontinenc post surgical    Caffeine use     2 coffee/day, 1-2 tea per week    Chronic back pain     Chronic ITP (idiopathic thrombocytopenia) (HCC)     CVA (cerebral infarction) ,     balance issues, short term memory loss    Diabetic gastroparesis ([address]     Diverticular disease     GERD (gastroesophageal reflux disease)     Hiatal hernia     Hip fracture ([address]     History of blood transfusion     History of decreased platelet count     Hyperlipemia     Hypertension     Internal hemorrhoid     Macular degeneration of left eye     S/P laser treatment    Nausea and vomiting     Neuropathy     Osteoarthritis     Ringing in ears     Self-catheterizes urinary bladder     6-7 times daily    TIA (transient ischemic attack) 2000    x1  Tubular adenoma     colon polyps    Type II or unspecified type diabetes mellitus without mention of complication, not stated as uncontrolled     Urinary incontinence     frequent UTI s/p infection, surgical dilatation lead to incontinence    Wears dentures     full on top, partial on bottom    Wears glasses      Past Surgical History:   Procedure Laterality Date    APPENDECTOMY  1962    BLADDER SURGERY      bladder stimulator    BLADDER SUSPENSION  2002    multiple    CARDIAC CATHETERIZATION  2008    no stents    CARDIOVASCULAR STRESS TEST  12/2014    CATARACT REMOVAL WITH IMPLANT Left [facility]/07[facility]2017    [facility]    CATARACT REMOVAL WITH IMPLANT Right 11/28/2017    [facility]    COLON SURGERY      colostomy reversal    COLONOSCOPY  04/07/2016    tubular adenoma x3; internal hemorrhoids    COLONOSCOPY N/A 11/06/2019    COLONOSCOPY DIAGNOSTIC performed by Alex Nunez MD at [address] [facility]  11/06/2019    COLOSTOMY  1986    bowel obstruction/ rupture from diverticular disease, reversal 3 mos later    CYSTOSCOPY  09/08/2017    W/ 200IU Botox     FRACTURE SURGERY Right 2011    hip    FRACTURE SURGERY Left 2001    [facility]RIST    FRACTURE SURGERY Left 2013    ankle    HERNIA REPAIR      HYSTERECTOMY  1969    JOINT REPLACEMENT Bilateral 2000    BILAT KNEES    LUMBAR FUSION  2017    L2/L3    [place] XCAPSL CTRC RMVL INSJ IO LENS PROSTH W/O ECP Left 11/07/2017    EYE CATARACT EMULSIFICATION IOL IMPLANT performed by Nevin Sahu MD at [facility] W/O ECP Right 11/28/2017    EYE CATARACT EMULSIFICATION IOL IMPLANT performed by Nevin Sahu MD at [facility] Right 10/27/2015    WITH POLY EXCHANGE BIOMET AND GPS APPLICATION    REVISION TOTAL KNEE ARTHROPLASTY Left 07/14/2020    KNEE TOTAL ARTHROPLASTY REVISION - POLY EXCHANGE [facility] [address] [facility]      fusion, did not take   [address]    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 Maternal Grandmother     Cancer Brother 46        bronchogenic adenocarcinoma cancer    Lung Cancer Brother     Cancer Sister         lung    Lung Cancer Sister          72    Breast Cancer Sister          62    Cancer Sister         breast   Plan  Patient desires to return back to physical therapy and we gave her prescription for this. In regards to the patient's foot and ankle complaints she will be referred to Dr. Celsa Frye. She will return back here in annual basis for her total knee      Provider Attestation:  Kinsey Gill, personally performed the services described in this documentation. All medical record entries made by the scribe were at my direction and in my presence. I have reviewed the chart and discharge instructions and agree that the records reflect my personal performance and is accurate and complete. Hermes Mak MD. 21      Please note that this chart was generated using voice recognition Dragon dictation software. Although every effort was made to ensure the accuracy of this automated transcription, some errors in transcription may have occurred.

## 2021-02-04 DIAGNOSIS — M79.672 LEFT FOOT PAIN: Primary | ICD-10-CM

## 2021-02-08 ENCOUNTER — OFFICE VISIT (OUTPATIENT)
Dept: ORTHOPEDIC SURGERY | Age: 81
End: 2021-02-08
Payer: MEDICARE

## 2021-02-08 VITALS — BODY MASS INDEX: 32.25 KG/M2 | TEMPERATURE: 97 F | WEIGHT: 160 LBS | HEIGHT: 59 IN

## 2021-02-08 DIAGNOSIS — M19.079 ARTHRITIS OF MIDFOOT: Primary | ICD-10-CM

## 2021-02-08 DIAGNOSIS — E11.42 DIABETIC POLYNEUROPATHY ASSOCIATED WITH TYPE 2 DIABETES MELLITUS (HCC): ICD-10-CM

## 2021-02-08 PROCEDURE — G8417 CALC BMI ABV UP PARAM F/U: HCPCS | Performed by: ORTHOPAEDIC SURGERY

## 2021-02-08 PROCEDURE — 4040F PNEUMOC VAC/ADMIN/RCVD: CPT | Performed by: ORTHOPAEDIC SURGERY

## 2021-02-08 PROCEDURE — 1123F ACP DISCUSS/DSCN MKR DOCD: CPT | Performed by: ORTHOPAEDIC SURGERY

## 2021-02-08 PROCEDURE — G8427 DOCREV CUR MEDS BY ELIG CLIN: HCPCS | Performed by: ORTHOPAEDIC SURGERY

## 2021-02-08 PROCEDURE — 1090F PRES/ABSN URINE INCON ASSESS: CPT | Performed by: ORTHOPAEDIC SURGERY

## 2021-02-08 PROCEDURE — G8484 FLU IMMUNIZE NO ADMIN: HCPCS | Performed by: ORTHOPAEDIC SURGERY

## 2021-02-08 PROCEDURE — 99214 OFFICE O/P EST MOD 30 MIN: CPT | Performed by: ORTHOPAEDIC SURGERY

## 2021-02-08 PROCEDURE — 1036F TOBACCO NON-USER: CPT | Performed by: ORTHOPAEDIC SURGERY

## 2021-02-08 PROCEDURE — G8400 PT W/DXA NO RESULTS DOC: HCPCS | Performed by: ORTHOPAEDIC SURGERY

## 2021-02-08 NOTE — PROGRESS NOTES
[facility] [id number] [facility]  Geovani Rubin [facility] 2.  [address] [address]  Dept: [phone]    Ambulatory Orthopedic Consult      CHIEF COMPLAINT:    Chief Complaint   Patient presents with    Foot Pain     left foot       HISTORY OF PRESENT ILLNESS:      The patient is a 79 y.o. female who is being seen for consultation and evaluation of pain at the dorsal midfoot, which began about 2 years ago. The pain is described mainly with mechanical terms (dull/sharp/throbbing). The pain is worse with activity and better with rest. The patient reports a progressive course. The patient has tried:      [x]  rest/activity modification          []  NSAIDs      []  opiates      []  orthotics        [x]  change in shoes   []  home exercises  [x]  physical therapy      []  CAM boot     []  brace:    []  injection:       []  surgery:      The patient is previously seen a podiatrist for this problem. REVIEW OF SYSTEMS:  Constitutional: Negative for fever. HENT: Negative for tinnitus. Eyes: Negative for pain. Respiratory: Negative for shortness of breath. Cardiovascular: Negative for chest pain. Gastrointestinal: Negative for abdominal pain. Genitourinary: Negative for dysuria. Skin: Negative for rash. Neurological: Negative for headaches. Hematological: Does not bruise/bleed easily.    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), Caffeine use, Chronic back pain, Chronic ITP (idiopathic thrombocytopenia) ([address] CVA (cerebral infarction) (2008, 2010), Diabetic gastroparesis ([address] Diverticular disease (1986), GERD (gastroesophageal reflux disease), Hiatal hernia, Hip fracture ([address] History of blood transfusion, History of decreased platelet count, Hyperlipemia, Hypertension, Internal hemorrhoid, Macular degeneration of left eye (1980's), Nausea and vomiting, Neuropathy, Osteoarthritis, Ringing in ears, Self-catheterizes urinary bladder, TIA (transient ischemic attack) (2000), Tubular adenoma, Type II or unspecified type diabetes mellitus without mention of complication, not stated as uncontrolled, Urinary incontinence, Wears dentures, and Wears glasses. Past Surgical History:    She  has a past surgical history that includes bladder suspension (2002); Hysterectomy (1969); Tonsillectomy (1978); Appendectomy (1962); Spine surgery (2010); colostomy (1986); Revision Colostomy (1986); Spine surgery (1990); Upper gastrointestinal endoscopy; Bladder surgery; Upper gastrointestinal endoscopy (05/05/2014); cardiovascular stress test (12/2014); Colon surgery; eunice and bso (cervix removed); fracture surgery (Right, 2011); fracture surgery (Left, 2001); fracture surgery (Left, 2013); Cardiac catheterization (2008); Revision total knee arthroplasty (Right, 10/27/2015); Colonoscopy (04/07/2016); Upper gastrointestinal endoscopy (04/07/2016); lumbar fusion (2017); Cystocopy (09/08/2017); Cataract removal with implant (Left, 11/07/2017); pr xcapsl ctrc rmvl insj io lens prosth w/o ecp (Left, 11/07/2017); Cataract removal with implant (Right, 11/28/2017); pr xcapsl ctrc rmvl insj io lens prosth w/o ecp (Right, 11/28/2017); Upper gastrointestinal endoscopy (N/A, 07/17/2018); joint replacement (Bilateral, 2000); hernia repair; Colonoscopy (N/A, 11/06/2019); Revision total knee arthroplasty (Left, 07/14/2020); and Colonoscopy (11/06/2019).      Current Medications:     Current Outpatient Medications:     irbesartan (AVAPRO) 75 MG tablet, Take 1 tablet by mouth daily, Disp: 90 tablet, Rfl: 1    polyethylene glycol (GLYCOLAX) 17 GM/SCOOP powder, Take 17 g by mouth daily 2 tabs every morning, Disp: , Rfl:     Wheat Dextrin (BENEFIBER DRINK MIX PO), Take by mouth 2 tablespoons every night, Disp: , Rfl:    ergocalciferol (ERGOCALCIFEROL) 1.25 MG (00518 UT) capsule, Take 1 capsule by mouth once a week, Disp: , Rfl:     benzonatate (TESSALON) 200 MG capsule, Take 1 capsule by mouth as needed, Disp: , Rfl:     albuterol (PROVENTIL) (5 MG/ML) 0.5% nebulizer solution, Inhale 0.5 mLs into the lungs as needed, Disp: , Rfl:     olmesartan (BENICAR) 5 MG tablet, Take 1 tablet by mouth 2 times daily, Disp: , Rfl:     nystatin (MYCOSTATIN) 686541 UNIT/GM powder, Apply 3 times daily. , Disp: 45 g, Rfl: 3    esomeprazole (NEXIUM) 40 MG delayed release capsule, Take 1 capsule by mouth every morning (before breakfast), Disp: 90 capsule, Rfl: 3    hydrocortisone (ANUSOL-HC) 2.5 % CREA rectal cream, Place rectally 2 times daily, Disp: 28 g, Rfl: 3    vitamin B-12 (CYANOCOBALAMIN) 1000 MCG tablet, Take 1 tablet by mouth once a week, Disp: 30 tablet, Rfl: 3    tiZANidine (ZANAFLEX) 4 MG tablet, Take 1 tablet by mouth 4 times daily as needed (Muscle spasms), Disp: 40 tablet, Rfl: 0    aspirin 81 MG EC tablet, Take 1 tablet by mouth 2 times daily, Disp: 30 tablet, Rfl: 3    nystatin-triamcinolone (MYCOLOG II) 348943-5.1 UNIT/GM-% cream, Apply topically 4 times daily Apply topically 4 times daily. , Disp: 30 g, Rfl: 3    rOPINIRole (REQUIP) 1 MG tablet, Take by mouth nightly , Disp: , Rfl:     glipiZIDE (GLUCOTROL) 5 MG tablet, 2.5mg in the am and 2.5 mg in the pm, Disp: , Rfl:     Continuous Blood Gluc Sensor (FREESTYLE DAVIS 14 DAY SENSOR) MISC, , Disp: , Rfl:     Salicylic Acid (COMPOUND W EX), , Disp: , Rfl:     LIVALO 2 MG TABS tablet, Take 2 mg by mouth nightly , Disp: , Rfl:     Multiple Vitamins-Minerals (THERAPEUTIC MULTIVITAMIN-MINERALS) tablet, Take 1 tablet by mouth daily, Disp: , Rfl:     Multiple Vitamins-Minerals (PRESERVISION AREDS PO), Take by mouth daily , Disp: , Rfl:     Continuous Blood Gluc  (FREESTYLE DAVIS 14 DAY READER) MATTHEW, , Disp: , Rfl:     donepezil (ARICEPT) 10 MG tablet, Take 10 mg by mouth nightly , Disp: , Rfl:     estradiol (ESTRACE VAGINAL) 0.1 MG/GM vaginal cream, Place 1 g vaginally Twice a Week (Patient taking differently: Place 1 g vaginally every 14 days ), Disp: 1 Tube, Rfl: 5    FARXIGA 10 MG tablet, TAKE 1 TABLET BY MOUTH IN THE MORNING , Disp: 30 tablet, Rfl: 11    CRANBERRY PO, Take by mouth 2 times daily, Disp: , Rfl:      Allergies: Iodides, Povidone-iodine, Sulfa antibiotics, Betadine [povidone iodine], Cephalexin, Cephalexin, Cozaar [losartan], Cymbalta [duloxetine hcl], Demerol, Doxycycline, Meperidine, Morphine, Pcn [penicillins], Zithromax [azithromycin], Clindamycin/lincomycin, Etodolac, Fluorescein, Iodine, Lisinopril, Penicillin g, Soap, Toviaz [fesoterodine fumarate er], and Metformin and related    Family History:  family history includes Breast Cancer in her mother and sister; Cancer in her maternal grandmother, mother, sister, and sister; Cancer (age of onset: 46) in her brother; Heart Attack in her father; Heart Disease in her father; [place] Running in her brother and sister. Social History:   Social History     Occupational History    Occupation: retired   Tobacco Use    Smoking status: Former Smoker     Packs/day: 0.50     Years: 20.00     Pack years: 10.00     Quit date: 1982     Years since quittin.7    Smokeless tobacco: Former User     Quit date: 1982   Substance and Sexual Activity    Alcohol use: No    Drug use: No    Sexual activity: Not on file     Occupation: Retired     OBJECTIVE:  Temp 97 °F (36.1 °C) (Infrared)   Ht 4' 11\" (1.499 m)   Wt 160 lb (72.6 kg)   BMI 32.32 kg/m²    Psych: alert and oriented to person, time, and place  Cardio:  well perfused extremities  Resp:  normal respiratory effort  Skin:  no cyanosis  Hem/lymph:  no lymphedema  Neuro:  sensation to light touch grossly intact throughout all nerve distributions in the foot   Musculoskeletal:    RLE:  Vascular:  The foot is warm and well-perfused, good capillary refill. Skin: Intact, no erythema/ulcers. No significant swelling of foot. Musculoskeletal: Able to fire FHL/EHL/TA/GCS  Range of Motion: No pain with ankle or subtalar motion  Stable ankle exam  No tenderness over any bony prominences      LLE:  Vascular: The foot is warm and well-perfused, good capillary refill. Skin: Intact, no erythema/ulcers. No significant swelling of foot. Musculoskeletal: Able to fire FHL/EHL/TA/GCS  Range of Motion: No pain with ankle or subtalar motion  Stable ankle exam  Tenderness diffusely throughout the midfoot      RADIOLOGY:   2/8/2021 FINDINGS:  Three weightbearing views (AP, Mortise, and Lateral) of the left ankle and three weightbearing views (AP, Oblique, Lateral) of the left foot were obtained in the office today and reviewed, revealing no acute fracture, dislocation, or radioopaque foreign body/tumor. Degenerative changes of midfoot diffusely as well as tibiotalar and subtalar joint with joint narrowing, sclerosis, and osteophytes. IMPRESSION:  No acute fracture/dislocation. Degenerative changes as above. Electronically signed by Homer Chapman MD      ASSESSMENT AND PLAN:  Andres Izquierdo was seen today for Foot Pain (left foot)  The primary encounter diagnosis was Arthritis of midfoot. A diagnosis of Diabetic polyneuropathy associated with type 2 diabetes mellitus ([address] was also pertinent to this visit. Body mass index is 32.32 kg/m². She has left midfoot arthritis with underlying diabetic neuropathy. Notably, she has a somewhat complex past medical history as above. She has a history of vitamin D deficiency, history of TIA, chronic idiopathic thrombocytopenia, a history of memory loss, history of type 2 diabetes with peripheral neuropathy. I had a long discussion today with the patient about the likely diagnosis and its natural history, physical exam and imaging findings, as well as treatment options in detail.  We discussed rest/activity modification, swelling control, NSAIDs/Acetaminophen/topical anesthetics, orthotics/shoewear modification, bracing/immobilization, injections, and physical therapy. Surgically, we discussed midfoot fusion in the future, if her symptoms are unable to be appropriately controlled with conservative management. We discussed the expected postoperative course, including the relevant weightbearing restrictions and immobilization. We also discussed that she lives with her 79-year-old , and she reports that she is the sole , and given the postoperative weightbearing restrictions, she would be unable to get around appropriately manage her ADLs. As a result of our discussion including risks/benefits/alternatives, the patient is able to make an informed decision, and has elected to proceed with conservative management. Orders/referrals were placed as below at today's visit. Topical lidocaine patches were ordered for pain control. I referred the patient to prosthetics orthotics to obtain a rocker-bottom shoe with a stiff sole, and she may use her custom diabetic inserts in the shoe to help prevent ulceration and skin breakdown. We discussed routine diabetic foot care, and she will continue to follow-up with her podiatrist, Dr. Demar Purvis. All questions were answered and the patient agrees with the above plan. The patient will return to clinic in 3 months without x-rays. Return in about 3 months (around 5/8/2021). No orders of the defined types were placed in this encounter. Orders Placed This Encounter   Procedures    DME Order for Orthosis as OP     Eval and treat.      Please provide:  Stiff soled shoes with rocker bottom; please make sure patient's diabetic inserts will fit comfortably    Yolis Jones MD  Orthopedic Surgery, Foot and Ankle         Yolis Jones MD  Orthopedic Surgery, Foot and Ankle        Please excuse any typos/errors, as this note was created with the assistance of voice recognition software. While intending to generate a document that actually reflects the content of the visit, the document can still have some errors including those of syntax and sound-a-like substitutions which may escape proof reading. In such instances, actual meaning can be extrapolated by context.

## 2021-02-08 NOTE — LETTER
Dr. Viridiana mUanzor  [facility] [address] [place] [address] [place] [address] [facility]  [phone]        2/8/2021     Patient: Evert Bryan  YOB: 1940    Dear Edy Damico MD,    I had the pleasure of seeing one of your patients, Evert Bryan, recently in the office. Below are the relevant portions of my assessment and plan of care. ASSESSMENT AND PLAN:  She has left midfoot arthritis with underlying diabetic neuropathy. Notably, she has a somewhat complex past medical history as above. She has a history of vitamin D deficiency, history of TIA, chronic idiopathic thrombocytopenia, a history of memory loss, history of type 2 diabetes with peripheral neuropathy. I had a long discussion today with the patient about the likely diagnosis and its natural history, physical exam and imaging findings, as well as treatment options in detail. We discussed rest/activity modification, swelling control, NSAIDs/Acetaminophen/topical anesthetics, orthotics/shoewear modification, bracing/immobilization, injections, and physical therapy. Surgically, we discussed midfoot fusion in the future, if her symptoms are unable to be appropriately controlled with conservative management. We discussed the expected postoperative course, including the relevant weightbearing restrictions and immobilization. We also discussed that she lives with her 66-year-old , and she reports that she is the sole , and given the postoperative weightbearing restrictions, she would be unable to get around appropriately manage her ADLs. As a result of our discussion including risks/benefits/alternatives, the patient is able to make an informed decision, and has elected to proceed with conservative management.

## 2021-02-09 ENCOUNTER — HOSPITAL ENCOUNTER (OUTPATIENT)
Dept: PHYSICAL THERAPY | Age: 81
Setting detail: THERAPIES SERIES
Discharge: HOME | End: 2021-02-09
Payer: MEDICARE

## 2021-02-09 DIAGNOSIS — M19.079 ARTHRITIS OF MIDFOOT: Primary | ICD-10-CM

## 2021-02-09 PROCEDURE — 97161 PT EVAL LOW COMPLEX 20 MIN: CPT

## 2021-02-09 PROCEDURE — 97110 THERAPEUTIC EXERCISES: CPT

## 2021-02-09 RX ORDER — LIDOCAINE 4 G/G
PATCH TOPICAL
Qty: 14 PATCH | Refills: 0 | Status: CANCELLED | OUTPATIENT
Start: 2021-02-09

## 2021-02-09 NOTE — PROGRESS NOTES
Patient seen yesterday and was advised that Lidocaine patches were going to be called into the pharmacy for her. This was sent to her pharmacy. Did you want to Rx them?

## 2021-02-09 NOTE — CONSULTS
[facility]   Outpatient Physical Therapy  Physical Therapy Lower Extremity Evaluation    Date:  2021  Patient: Berto Spence  : 1940  MRN: [id number]  Physician: Calvin Manriquez MD    Insurance: Medicare with [employer] secondary. Visits BMN and in accordance with Medicare guidelines. Medical Diagnosis: Z96.659 (ICD-10-CM) - History of revision of total knee arthroplasty (LEFT)  Rehab Codes: M25.662, M25.562, R26.9, R53.1  Onset Date: 20   Next 's appt: As needed with Dr. Sammy Lefort. Follows up with Dr. Ny Ferguson for L foot on 5/10/21. Subjective:   CC: L knee stiffness/achiness  HPI: Hx of L TKA revision via Dr. Sammy Lefort on 20 with home health PT completed immediately after. Pt was attended PT at this facility but discontinued after 9 visits as pt was struggling with other issues of neuropathy, L foot pain, and pt was having issues balancing PT appointments with needing to care of her . Pt was recently referred back to continue PT by her surgeon at her latest follow up visit, with focus on LE strength and stability. Of note, pt has also been having increasing issues with L midfoot pain and recently initiated care with Dr. Ny Ferguson. Foot pain is currently being managed with lidocaine patches and orthopedic shoes. PT referral is solely for L knee at this time. PMHx: [] Unremarkable [x] Diabetes [x] HTN  [] Pacemaker   [] MI/Heart Problems [] Cancer [x] Arthritis [x] Other: R TKA , R hip ORIF               [x] Refer to full medical chart  In EPIC     Comorbidities:   [] Obesity [] Dialysis  [x] Other: Neuropathy   [] Asthma/COPD [] Dementia [] Other:   [] Stroke [] Sleep apnea [] Other:   [] Vascular disease [] Rheumatic disease [] Other:     Prior Imaging: Most recent x-rays of L knee from 2020 revealed good healing/alignment of prosthetic components. Recent x-rays of L foot () showed degenerative changes in L mid foot, talocrural joint and subtalar joint. Previous Treatment: 9 prior PT visits at this location through September 2020 with good initial results. Discontinued PT at that time due to needing to care for her , and concerns with COVID-19    Home Environment: Lives in a mobile home with her . 5 stairs to enter home with railings on B sides. Medications: [] Refer to full medical record [] None [x] Other: Lidocaine patches for L foot. Advil as needed  Allergies:      [x] Refer to full medical record [] None [] Other:    Work Status: Retired    Prior Level of Function: Independent and unrestricted with all ADLs. Previously walking up to 1/2 mile and using her stationary bike for exercise.     Pain:  [x] Yes  [] No Location: L knee Pain Rating: (0-10 scale) 1/10  Pain altered Tx:  [] Yes  [x] No  Action:    Symptoms:  [x] Improving [] Worsening [] Same  Aggravating factors: Lifting leg, bending knee, pressure on knee  Alleviating factors: Rest      Functional Status Previous level of function Current level of function Comments   Sitting [x] Independent  [] Deficit [x] Independent  [] Deficit    Standing/walking [x] Independent  [] Deficit [] Independent  [x] Deficit 1 hour   Driving [x] Independent  [] Deficit [x] Independent  [] Deficit    Housekeeping/Meal Preparation [x] Independent  [] Deficit [x] Independent  [] Deficit Aches afterwards   Lifting/Carrying [x] Independent  [] Deficit [] Independent  [x] Deficit Aches/difficulty   Bending/Reaching [x] Independent  [] Deficit [] Independent  [x] Deficit Difficulty reaching to floor due to tightness in L knee   Stair climbing [x] Independent  [] Deficit [] Independent  [x] Deficit Achiness/difficulty   Pivoting [x] Independent  [] Deficit [x] Independent  [] Deficit    Squatting [x] Independent  [] Deficit [] Independent  [x] Deficit Pain/difficulty   Other [] Independent  [] Deficit [] Independent  [] Deficit      Patient Goals/Rehab Expectations: Make knee more []    ITB/TFL []  []  []    Hip Flexors []  [x]  []    Quads []  [x]  []    Hamstrings []  []  []    Gastrocnemius []  []  []    Soleus []  []  []     []  []  []     []  []  []        Joint Mobility: Patellofemoral mobility pain free and WNL. Decreased posterior tibiofemoral mobility    Palpation: Hypertonic and TTP at L quads    Gait: Independent []           Modified Independent [x]            Not independent []  Comments:  Modified independent with rollator walker or SPC. Functional Testing:   Five Time Sit to Stand (FTSTS): 26 seconds from standard chair with UE support from arm rests    Assessment:  Problems:    [x] ? Pain:  [x] ? ROM:  [x] ? Strength:  [x] ? Function:  [] Other:    Pt is a 80 y/o F referred for continued PT at this facility, now ~7 months s/p L TKA revision. Pt was seen previously at this location in 2020 but did not complete her POC due to personal/family conflicts at that time. Pt presents today with c/o L knee pain and stiffness, with difficulty bending her knee for lower body dressing tasks, transfers and bending/squatting to floor. Demonstrates reduced L knee flexion ROM, tightness in L hip flexors/quads and notable residual weakness in L LE. Pt will benefit from skilled PT intervention to address these impairments and restore full functional mobility and ADL tolerance/efficiency to her prior level of function. Barrier to rehab include high levels of pain in L foot but is pending orthotics consult for a rocker bottom shoe which may alleviate strain on midfoot region. Initial HEP established today with good understanding. STG: (to be met in 5 treatments)  1. Pt will self report worst pain no greater than 2/10 in order to better tolerate ADLs/work activities with minimal dysfunction  2. Pt will improve AROM in L knee to 120 deg flexion in order to demonstrate ability to move/reach in all planes unrestricted at PLOF  3.  Pt will improve FTSTS to <15 seconds with minimal use of UEs in order to demonstrate improved safety with all functional transitions  LTG: (to be met in 10 treatments)  1. Pt will demonstrate improved L LE strength to 4/5 or greater in order to demonstrate improved stability/strength necessary for unrestricted ADLs/work activities  2. Pt will improve KOOS to 10% impaired or less in order to demonstrate improved functional tolerances at PLOF with minimal restriction/dysfunction  3. Pt will demonstrate independence with a long term HEP for continued progress/maintenance after completion of PT                   Functional Assessment Used: KOOS Jr.  Current Status Score: 39% impaired  Goal Status Score: 10% impaired    Evaluation Complexity:  History (Personal factors, comorbidities) [] 0 [] 1-2 [x] 3+   Exam (limitations, restrictions) [] 1-2 [x] 3 [] 4+   Clinical presentation (progression) [] Stable [] Evolving  [] Unstable   Decision Making [] Low [] Moderate [] High    [] Low Complexity [] Moderate Complexity [] High Complexity     Rehab Potential:  [x] Good  [] Fair  [] Poor   Suggested Professional Referral:  [x] No  [] Yes:  Barriers to Goal Achievement[de-identified]  [x] No  [] Yes:  Domestic Concerns:  [x] No  [] Yes:    Pt. Education:  [x] Plans/Goals, Risks/Benefits discussed  [x] Home exercise program  Method of Education: [x] Verbal  [x] Demo  [x] Written  Comprehension of Education:  [x] Verbalizes understanding. [x] Demonstrates understanding. [x] Needs Review. [] Demonstrates/verbalizes understanding of HEP/Ed previously given.     Treatment Plan:  [x] Therapeutic Exercise   68208  [] Iontophoresis: 4 mg/mL Dexamethasone Sodium Phosphate  mAmin  30928   [x] Therapeutic Activity  63455 [x] Vasopneumatic cold with compression  09930    [x] Gait Training   95891 [] Ultrasound   90503   [x] Neuromuscular Re-education  97789 [] Electrical Stimulation Unattended  67018   [x] Manual Therapy  13556 [] Electrical Stimulation Attended  53987   [x] Instruction in HEP  [] Lumbar/Cervical Traction  [id number]   [] Aquatic Therapy   [id number] [] Cold/hotpack    [] Massage   [id number]      [] Dry Needling, 1 or 2 muscles  72651   [] Biofeedback, first 15 minutes   83531  [] Biofeedback, additional 15 minutes   58133 [] Dry Needling, 3 or more muscles  83511        Frequency: 2x/week for 10 visits    Todays Treatment:  Modalities:   Precautions: None  Exercises:  Exercise Reps/ Time Weight/ Level Comments   Seated knee flexion AAROM   Previous HEP review   Heel slides   HEP   Modified Dandre stretch   HEP               Other:    Specific Instructions for next treatment: STM as needed to L quads/hip flexors, L knee flexion ROM and lower quarter strengthening/stabilization as tolerated    Treatment Charges: Mins Units   [x] Evaluation       [x]  Low       []  Moderate       []  High 37 1   []  Modalities     [x]  Ther Exercise 8 1   []  Manual Therapy     []  Ther Activities     []  Aquatics     []  Neuromuscular     []  Gait Training     []  Dry Needling           1-2 muscles     []  Dry Needling           3 or more muscles     [] Vasocompression     []  Other       TOTAL TREATMENT TIME: 45    Time in: 3:10pm    Time Out: 3:55pm    Electronically signed by: Dany Bellamy PT        Physician Signature:________________________________Date:__________________  By signing above or cosigning this note, I have reviewed this plan of care and certify a need for medically necessary rehabilitation services.      *PLEASE SIGN ABOVE AND FAX BACK ALL PAGES*

## 2021-02-10 DIAGNOSIS — M19.079 ARTHRITIS OF MIDFOOT: Primary | ICD-10-CM

## 2021-02-10 RX ORDER — LIDOCAINE 4 G/G
PATCH TOPICAL
Qty: 14 PATCH | Refills: 0 | Status: SHIPPED | OUTPATIENT
Start: 2021-02-10 | End: 2021-04-14

## 2021-02-11 ENCOUNTER — TELEPHONE (OUTPATIENT)
Dept: ORTHOPEDIC SURGERY | Age: 81
End: 2021-02-11

## 2021-02-11 DIAGNOSIS — M19.079 ARTHRITIS OF MIDFOOT: Primary | ICD-10-CM

## 2021-02-11 RX ORDER — LIDOCAINE 4 G/G
PATCH TOPICAL
Qty: 14 PATCH | Refills: 0 | Status: SHIPPED | OUTPATIENT
Start: 2021-02-11 | End: 2021-04-14

## 2021-02-12 RX ORDER — LIDOCAINE 50 MG/G
1 PATCH TOPICAL DAILY
Qty: 15 PATCH | Refills: 0 | Status: SHIPPED | OUTPATIENT
Start: 2021-02-12 | End: 2021-02-22

## 2021-02-16 ENCOUNTER — APPOINTMENT (OUTPATIENT)
Dept: PHYSICAL THERAPY | Age: 81
End: 2021-02-16
Payer: MEDICARE

## 2021-02-25 ENCOUNTER — HOSPITAL ENCOUNTER (OUTPATIENT)
Dept: PHYSICAL THERAPY | Age: 81
Setting detail: THERAPIES SERIES
Discharge: HOME | End: 2021-02-25
Payer: MEDICARE

## 2021-02-25 PROCEDURE — 97140 MANUAL THERAPY 1/> REGIONS: CPT

## 2021-02-25 PROCEDURE — 97110 THERAPEUTIC EXERCISES: CPT

## 2021-02-25 NOTE — FLOWSHEET NOTE
[address] [facility]   [address]   Phone: [phone]   Fax: [phone]    Physical Therapy Daily Treatment Note      Date:  2021  Patient Name:  Kole Gage    :  1940  MRN: [id number]  Physician: Sandi Harley MD     Insurance: Medicare with [employer] secondary. Visits BMN and in accordance with Medicare guidelines  Diagnosis: M59.438 (ICD-10-CM) - History of revision of total knee arthroplasty (LEFT)   Onset Date: 20   Next Dr. Ida Joe: As needed  Visit# / total visits: 2/10  Cancels/No Shows: 0/0  Next progress note due by visit #10 or 3/11/21    Subjective:    Pain:  [x] Yes  [] No Location: L knee Pain Rating: (0-10 scale) 6/10  Pain altered Tx:  [] No  [] Yes  Action:  Comments: : Pt states that her knee has been more swollen and stiff recently but has been trying to her home exercises as best she can. 6/10 discomfort currently in L knee. Objective:  Modalities:   Precautions: None  Exercises:  Exercise Reps/ Time Weight/ Level Comments Performed=x   Heel slides with red swiss ball 20 reps   x   SLRs 6 reps x 3   x   Bridges 10 reps x 2   x   NUSTEP 6' L4  x   Gastroc slant board stretch 30'' x 3  Bilateral x                        Other: Manual therapy x 10'  -MFR to L VL  -Posterior tibiofemoral glides on L (Grade III/IV)   -Manual knee flexion    Specific Instructions for next treatment: STM as needed to L quads/hip flexors, L knee flexion ROM and lower quarter strengthening/stabilization as tolerated      Assessment: [] Progressing toward goals. [x] No change. [] Other:    [x] Patient would continue to benefit from skilled physical therapy services in order to: decrease pain, improve ROM and restore full strength/mobility at [facility]    : More tightness and discomfort reported today vs. At initial evaluation and pt states that mobility has been harder recently due to discomfort in her L foot.  Increased time required to perform all interventions today due to cramping in B LEs. Fatigue/difficulty with all interventions but otherwise good motivation present through visit. HEP verbally updated with focus on L LE strength and flexibility, and also reinforced importance of proper hydration & nutrition to minimize frequency of cramping. Good understanding with all education today and will continue to benefit from additional reinforcement. Pt reported significant relief post session with 0/10 pain afterwards    STG: (to be met in 5 treatments)  1. Pt will self report worst pain no greater than 2/10 in order to better tolerate ADLs/work activities with minimal dysfunction  2. Pt will improve AROM in L knee to 120 deg flexion in order to demonstrate ability to move/reach in all planes unrestricted at PLOF  3. Pt will improve FTSTS to <15 seconds with minimal use of UEs in order to demonstrate improved safety with all functional transitions  LTG: (to be met in 10 treatments)  1. Pt will demonstrate improved L LE strength to 4/5 or greater in order to demonstrate improved stability/strength necessary for unrestricted ADLs/work activities  2. Pt will improve KOOS to 10% impaired or less in order to demonstrate improved functional tolerances at PLOF with minimal restriction/dysfunction  3. Pt will demonstrate independence with a long term HEP for continued progress/maintenance after completion of PT    Pt. Education:  [x] Yes  [] No  [] Reviewed Prior HEP/Ed  Method of Education: [x] Verbal  [] Demo  [] Written  Comprehension of Education:  [x] Verbalizes understanding. [x] Demonstrates understanding. [] Needs review. [] Demonstrates/verbalizes HEP/Ed previously given. Plan: [x] Continue per plan of care.    [] Other:      Treatment Charges: Mins Units   []  Modalities     [x]  Ther Exercise 30 2   [x]  Manual Therapy 10 1   []  Ther Activities     []  Aquatics     []  Neuromuscular     [] Vasocompression     [] Gait Training     [] Dry needling        [] 1 or 2 muscles        [] 3 or more muscles     []  Other     Total Treatment time 40 3     Time In: 3:00pm            Time Out: 3:40pm    Electronically signed by:  Koby Ricks PT

## 2021-02-28 ENCOUNTER — TELEPHONE (OUTPATIENT)
Dept: PRIMARY CARE CLINIC | Age: 81
End: 2021-02-28

## 2021-02-28 NOTE — TELEPHONE ENCOUNTER
Pt said she took her irbesartan at 8 AM.   Checked her BP at 8:30 Am d/t slight HA, slight lightheadedness, and fuzzy vision: BP was 198/91. BP has come down throughout the day, but still 151/81 before she paged this evening. Denies CP, SOB, n/t, weakness, or slurred speech. Has hx of TIAS and says she will call 911 if she gets symptoms. Advised she could take another 75 mg tab of irbesartan this afternoon and f/u in office this week with Dr. Matilde Montoya for HTN. Phone room: please schedule pt appt this week for hypertension with Dr. Matilde Montoya.

## 2021-03-01 ENCOUNTER — OFFICE VISIT (OUTPATIENT)
Dept: PRIMARY CARE CLINIC | Age: 81
End: 2021-03-01
Payer: MEDICARE

## 2021-03-01 ENCOUNTER — NURSE TRIAGE (OUTPATIENT)
Dept: OTHER | Facility: CLINIC | Age: 81
End: 2021-03-01

## 2021-03-01 VITALS
HEART RATE: 65 BPM | OXYGEN SATURATION: 98 % | TEMPERATURE: 96.6 F | BODY MASS INDEX: 32.15 KG/M2 | SYSTOLIC BLOOD PRESSURE: 140 MMHG | DIASTOLIC BLOOD PRESSURE: 90 MMHG | WEIGHT: 159.2 LBS

## 2021-03-01 DIAGNOSIS — F41.8 ANXIETY ABOUT HEALTH: ICD-10-CM

## 2021-03-01 DIAGNOSIS — I10 ESSENTIAL HYPERTENSION: Primary | ICD-10-CM

## 2021-03-01 DIAGNOSIS — D69.3 CHRONIC ITP (IDIOPATHIC THROMBOCYTOPENIA) (HCC): ICD-10-CM

## 2021-03-01 DIAGNOSIS — E11.51 TYPE 2 DIABETES MELLITUS WITH DIABETIC PERIPHERAL ANGIOPATHY WITHOUT GANGRENE, WITHOUT LONG-TERM CURRENT USE OF INSULIN (HCC): ICD-10-CM

## 2021-03-01 DIAGNOSIS — R00.2 PALPITATIONS: ICD-10-CM

## 2021-03-01 PROBLEM — I27.20 MILD PULMONARY HYPERTENSION (HCC): Status: RESOLVED | Noted: 2020-11-23 | Resolved: 2021-03-01

## 2021-03-01 PROCEDURE — 99214 OFFICE O/P EST MOD 30 MIN: CPT | Performed by: FAMILY MEDICINE

## 2021-03-01 PROCEDURE — 1090F PRES/ABSN URINE INCON ASSESS: CPT | Performed by: FAMILY MEDICINE

## 2021-03-01 PROCEDURE — 1036F TOBACCO NON-USER: CPT | Performed by: FAMILY MEDICINE

## 2021-03-01 PROCEDURE — G8417 CALC BMI ABV UP PARAM F/U: HCPCS | Performed by: FAMILY MEDICINE

## 2021-03-01 PROCEDURE — 1123F ACP DISCUSS/DSCN MKR DOCD: CPT | Performed by: FAMILY MEDICINE

## 2021-03-01 PROCEDURE — G8427 DOCREV CUR MEDS BY ELIG CLIN: HCPCS | Performed by: FAMILY MEDICINE

## 2021-03-01 PROCEDURE — G8400 PT W/DXA NO RESULTS DOC: HCPCS | Performed by: FAMILY MEDICINE

## 2021-03-01 PROCEDURE — 4040F PNEUMOC VAC/ADMIN/RCVD: CPT | Performed by: FAMILY MEDICINE

## 2021-03-01 PROCEDURE — G8484 FLU IMMUNIZE NO ADMIN: HCPCS | Performed by: FAMILY MEDICINE

## 2021-03-01 NOTE — TELEPHONE ENCOUNTER
Patient reports having issues with blood pressure. Patient reports hx of HTN an was told by the PCP on call last night to take a second blood pressure pill last night. Patient reports BP was 178/84 HR 72. Patient reports BP at 1000 BP was 169/71 and HR 71. Patient reports shaky feeling in her body. Denies any shortness of breath or chest pain. Patient reports being really tired feeling. Patient denies and neuro deficits this time and is talking just fine and doesn't have any problems with walking around. Patient's blood glucose was 64 this morning and states now it is 104 without having anything to eat today so far. Reason for Disposition   Patient wants to be seen    Answer Assessment - Initial Assessment Questions  1. BLOOD PRESSURE: \"What is the blood pressure? \" \"Did you take at least two measurements 5 minutes apart? \"      See above. 2. ONSET: \"When did you take your blood pressure? \"      See above. 3. HOW: \"How did you obtain the blood pressure? \" (e.g., visiting nurse, automatic home BP monitor)      Home BP kit that is an arm one. 4. HISTORY: \"Do you have a history of high blood pressure? \"      Yes    5. MEDICATIONS: Olvin Yeh you taking any medications for blood pressure? \" \"Have you missed any doses recently? \"      Denies and missed medications. 6. OTHER SYMPTOMS: \"Do you have any symptoms? \" (e.g., headache, chest pain, blurred vision, difficulty breathing, weakness)      Denies but states she feels shaky. 7. PREGNANCY: \"Is there any chance you are pregnant? \" \"When was your last menstrual period? \"      N/A    Protocols used: HIGH BLOOD PRESSURE-ADULT-OH    Patient called Oumar Joshi at [facility])  with red flag complaint. Brief description of triage: see above. Triage indicates for patient to be seen today. Care advice provided, patient verbalizes understanding; denies any other questions or concerns; instructed to call back for any new or worsening symptoms.     Writer provided warm transfer to [facility] for appointment scheduling. Attention Provider: Thank you for allowing me to participate in the care of your patient. The patient was connected to triage in response to information provided to the ECC. Please do not respond through this encounter as the response is not directed to a shared pool.

## 2021-03-01 NOTE — PROGRESS NOTES
[address] [place] [address] PRIMARY CARE  [address] [facility] [address]  Dept: [address] Sherrie Horvath is a 79 y.o. female Established patient, who presents today for her medical conditions/complaintsas noted below. Chief Complaint   Patient presents with    Hypertension     Pt states that she has been running high since yesterday. Pt brought in her BP cuff and it was compared: our cuff 140/84 cuff from home 176/93    Chest Pain     Pt c/o fluttering in the heart and a feeling of heaviness for about 3-4 days     Other     Right arm aching under the armpit for about a couple of weeks     Cough       HPI:     HPI  Home BP has been high   Gets funny feeling in chest, fluttering feeling in upper chest. Feels shakey. Can last all day. Gets tired and weak also. Pulse at home was in the 60's    Had a lot of GERD last week. And vomited last week. Had burning in throat. Took the nexium, mylanta and pepcid AC    Hx of TIA in past.     Hx of arthritis. Reviewed prior notes None  Reviewed previous Labs    LDL Cholesterol (mg/dL)   Date Value   07/06/2020 85   10/17/2018 137 (H)   07/16/2018 64     LDL Calculated (mg/dL)   Date Value   03/12/2015 47   08/18/2014 39       (goal LDL is <100)   AST (U/L)   Date Value   12/10/2020 19     ALT (U/L)   Date Value   12/10/2020 18     BUN (mg/dL)   Date Value   12/10/2020 17     Hemoglobin A1C (%)   Date Value   12/15/2020 6.7     TSH (uIU/mL)   Date Value   06/02/2016 1.800     BP Readings from Last 3 Encounters:   03/01/21 (!) 140/90   01/22/21 130/80   01/11/21 123/60          (goal 120/80)    Past Medical History:   Diagnosis Date    Age-related cognitive decline     Ankle pain     Left ankle fracture in ortho boat.    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    left with urinary incontinenc post surgical    Caffeine use     2 coffee/day, 1-2 tea per week  Chronic back pain     Chronic ITP (idiopathic thrombocytopenia) (HCC)     CVA (cerebral infarction) 2008, 2010    balance issues, short term memory loss    Diabetic gastroparesis ([facility] [address]     Diverticular disease 1986    GERD (gastroesophageal reflux disease)     Hiatal hernia     Hip fracture ([facility] [address]     History of blood transfusion     History of decreased platelet count     Hyperlipemia     Hypertension     Internal hemorrhoid     Macular degeneration of left eye 1980's    S/P laser treatment    Nausea and vomiting     Neuropathy     Osteoarthritis     Ringing in ears     Self-catheterizes urinary bladder     6-7 times daily    TIA (transient ischemic attack) 2000    x1    Tubular adenoma     colon polyps    Type II or unspecified type diabetes mellitus without mention of complication, not stated as uncontrolled     Urinary incontinence     frequent UTI s/p infection, surgical dilatation lead to incontinence    Wears dentures     full on top, partial on bottom    Wears glasses       Past Surgical History:   Procedure Laterality Date    APPENDECTOMY  1962    BLADDER SURGERY      bladder stimulator    BLADDER SUSPENSION  2002    multiple    CARDIAC CATHETERIZATION  2008    no stents    CARDIOVASCULAR STRESS TEST  12/2014    CATARACT REMOVAL WITH IMPLANT Left 11/07/2017    Raffoalhaji/StLiliane    CATARACT REMOVAL WITH IMPLANT Right 11/28/2017    Raffoalhaji/Sulaiman    COLON SURGERY      colostomy reversal    COLONOSCOPY  04/07/2016    tubular adenoma x3; internal hemorrhoids    COLONOSCOPY N/A 11/06/2019    COLONOSCOPY DIAGNOSTIC performed by Dell Oden MD at [address] [facility]  11/06/2019    COLOSTOMY  1986    bowel obstruction/ rupture from diverticular disease, reversal 3 mos later    CYSTOSCOPY  09/08/2017    W/ 200IU Botox     FRACTURE SURGERY Right 2011    hip    FRACTURE SURGERY Left 2001    WRIST    FRACTURE SURGERY Left 2013    ankle    HERNIA REPAIR [address]    JOINT REPLACEMENT Bilateral 2000    BILAT KNEES    LUMBAR FUSION      L2/L3    AZ XCAPSL CTRC RMVL INSJ IO LENS PROSTH W/O ECP Left 2017    EYE CATARACT EMULSIFICATION IOL IMPLANT performed by Oksana Rajput MD at [facility] 60 RMVL INSJ IO LENS PROSTH W/O ECP Right 2017    EYE CATARACT EMULSIFICATION IOL IMPLANT performed by Oksana Rajput MD at [facility] Right 10/27/2015    WITH POLY EXCHANGE BIOMET AND GPS APPLICATION    REVISION TOTAL KNEE ARTHROPLASTY Left 2020    KNEE TOTAL ARTHROPLASTY REVISION - POLY EXCHANGE performed by Finn Jasso MD at [address]  2010    fusion, did not take   [address]    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    NEAL AND BSO      TONSILLECTOMY      UPPER GASTROINTESTINAL ENDOSCOPY      UPPER GASTROINTESTINAL ENDOSCOPY  2014    UPPER GASTROINTESTINAL ENDOSCOPY  2016    mild gastritis    UPPER GASTROINTESTINAL ENDOSCOPY N/A 2018    retained thick secretions in proximal esophagus       Family History   Problem Relation Age of Onset    Breast Cancer Mother     Cancer Mother         breast and uterine  39    Heart Disease Father     Heart Attack Father          28   [address] [facility] Maternal Grandmother     Cancer Brother 46        bronchogenic adenocarcinoma cancer    Lung Cancer Brother     Cancer Sister         lung    Lung Cancer Sister          72    Breast Cancer Sister          62    Cancer Sister         breast       Social History     Tobacco Use    Smoking status: Former Smoker     Packs/day: 0.50     Years: 20.00     Pack years: 10.00     Quit date: 1982     Years since quittin.7    Smokeless tobacco: Former User     Quit date: 1982   Substance Use Topics    Alcohol use: No      Current Outpatient Medications Medication Sig Dispense Refill    lidocaine 4 % external patch Apply to affected area; leave in place no longer than 12 hrs then remove the patch; use no longer than 12 hrs/day total 14 patch 0    lidocaine 4 % external patch Apply to affected area; leave in place no longer than 12 hrs then remove the patch; use no longer than 12 hrs/day total 14 patch 0    irbesartan (AVAPRO) 75 MG tablet Take 1 tablet by mouth daily 90 tablet 1    polyethylene glycol (GLYCOLAX) 17 GM/SCOOP powder Take 17 g by mouth daily 2 tabs every morning      Wheat Dextrin (BENEFIBER DRINK MIX PO) Take by mouth 2 tablespoons every night      ergocalciferol (ERGOCALCIFEROL) 1.25 MG (20777 UT) capsule Take 1 capsule by mouth once a week      benzonatate (TESSALON) 200 MG capsule Take 1 capsule by mouth as needed      albuterol (PROVENTIL) (5 MG/ML) 0.5% nebulizer solution Inhale 0.5 mLs into the lungs as needed      nystatin (MYCOSTATIN) 730326 UNIT/GM powder Apply 3 times daily. 45 g 3    esomeprazole (NEXIUM) 40 MG delayed release capsule Take 1 capsule by mouth every morning (before breakfast) 90 capsule 3    hydrocortisone (ANUSOL-HC) 2.5 % CREA rectal cream Place rectally 2 times daily 28 g 3    vitamin B-12 (CYANOCOBALAMIN) 1000 MCG tablet Take 1 tablet by mouth once a week 30 tablet 3    tiZANidine (ZANAFLEX) 4 MG tablet Take 1 tablet by mouth 4 times daily as needed (Muscle spasms) 40 tablet 0    aspirin 81 MG EC tablet Take 1 tablet by mouth 2 times daily 30 tablet 3    nystatin-triamcinolone (MYCOLOG II) 454881-8.1 UNIT/GM-% cream Apply topically 4 times daily Apply topically 4 times daily.  30 g 3    rOPINIRole (REQUIP) 1 MG tablet Take by mouth nightly       glipiZIDE (GLUCOTROL) 5 MG tablet 2.5mg in the am and 2.5 mg in the pm      Continuous Blood Gluc Sensor (FREESTYLE DAVIS 14 DAY SENSOR) MISC       Salicylic Acid (COMPOUND W EX)       LIVALO 2 MG TABS tablet Take 2 mg by mouth nightly  Multiple Vitamins-Minerals (THERAPEUTIC MULTIVITAMIN-MINERALS) tablet Take 1 tablet by mouth daily      Multiple Vitamins-Minerals (PRESERVISION AREDS PO) Take by mouth daily       Continuous Blood Gluc  (FREESTYLE DAVIS 14 DAY READER) MATTHEW       donepezil (ARICEPT) 10 MG tablet Take 10 mg by mouth nightly       estradiol (ESTRACE VAGINAL) 0.1 MG/GM vaginal cream Place 1 g vaginally Twice a Week (Patient taking differently: Place 1 g vaginally every 14 days ) 1 Tube 5    FARXIGA 10 MG tablet TAKE 1 TABLET BY MOUTH IN THE MORNING  30 tablet 11    CRANBERRY PO Take by mouth 2 times daily       No current facility-administered medications for this visit. Facility-Administered Medications Ordered in Other Visits   Medication Dose Route Frequency Provider Last Rate Last Admin    0.9 % sodium chloride infusion 250 mL  250 mL Intravenous Once Kasandra Interiano MD         Allergies   Allergen Reactions    Iodides Anaphylaxis, Hives and Itching     \"Contrast dyes\"  This was added by someone but Patient states has had IVP dyes multiple times without problems and had a myelogram in Dec 2016 without problems    Povidone-Iodine Anaphylaxis, Hives and Swelling    Sulfa Antibiotics Hives and Swelling     Other reaction(s): Unknown    Betadine [Povidone Iodine] Hives    Cephalexin Hives and Swelling    Cephalexin Itching     Other reaction(s): Hallucinations  In 1969 received demerol and keflex together so was told to list both as allergies    Cozaar [Losartan] Nausea Only     Pt also gets sores on toungue    Cymbalta [Duloxetine Hcl] Diarrhea and Nausea And Vomiting     Weakness    Demerol Hives and Swelling    Doxycycline Other (See Comments)     Sore mouth        Meperidine Itching     Other reaction(s): Hallucinations      Morphine Hives and Itching    Penicillins Hives, Swelling and Itching     Other reaction(s):  Intolerance-unknown  Other reaction(s): Unknown  Zithromax [Azithromycin] Other (See Comments)     Sore mouth      Clindamycin/Lincomycin     Etodolac     Fluorescein     Iodine      Other reaction(s): Intolerance-unknown    Lisinopril      cough    Penicillin G     Soap     Toviaz [Fesoterodine Fumarate Er]     Metformin And Related Nausea And Vomiting     Diarrhea and N/V       Health Maintenance   Topic Date Due    Hepatitis C screen  1940    Annual Wellness Visit (AWV)  06/19/2019    DTaP/Tdap/Td vaccine (1 - Tdap) 01/09/2022 (Originally 8/12/1959)    COVID-19 Vaccine (2 of 2 - Moderna series) 03/17/2021    Lipid screen  07/06/2021    Potassium monitoring  12/10/2021    Creatinine monitoring  12/10/2021    Flu vaccine  Completed    Shingles Vaccine  Completed    Pneumococcal 65+ years Vaccine  Completed    DEXA (modify frequency per FRAX score)  Addressed    Hepatitis A vaccine  Aged Out    Hib vaccine  Aged Out    Meningococcal (ACWY) vaccine  Aged Out       Subjective:      Review of Systems   Cardiovascular: Positive for palpitations. Gastrointestinal:        GERD, takes nexium daily but it doesn't help  She has hx of gastroparesis       Objective:     BP (!) 140/90 (Site: Left Upper Arm, Position: Sitting, Cuff Size: Large Adult)   Pulse 65   Temp 96.6 °F (35.9 °C)   Wt 159 lb 3.2 oz (72.2 kg)   SpO2 98%   BMI 32.15 kg/m²   Physical Exam  Vitals signs and nursing note reviewed. Constitutional:       General: She is not in acute distress. Appearance: She is well-developed. She is not ill-appearing. HENT:      Head: Normocephalic and atraumatic. Right Ear: External ear normal.      Left Ear: External ear normal.   Eyes:      General: No scleral icterus. Right eye: No discharge. Left eye: No discharge. Conjunctiva/sclera: Conjunctivae normal.      Pupils: Pupils are equal, round, and reactive to light. Neck:      Thyroid: No thyromegaly. Trachea: No tracheal deviation. Cardiovascular:      Rate and Rhythm: Normal rate and regular rhythm. Heart sounds: Normal heart sounds. Pulmonary:      Effort: Pulmonary effort is normal. No respiratory distress. Breath sounds: Normal breath sounds. No wheezing. Chest:      Chest wall: Tenderness present. Musculoskeletal:      Right lower leg: Edema present. Left lower leg: No edema. Comments: Uses cane   Lymphadenopathy:      Cervical: No cervical adenopathy. Skin:     General: Skin is warm. Findings: No rash. Neurological:      Mental Status: She is alert and oriented to person, place, and time. Psychiatric:         Mood and Affect: Mood normal.         Behavior: Behavior normal.         Thought Content: Thought content normal.         Assessment:       Diagnosis Orders   1. Essential hypertension     2. Palpitations     3. Anxiety about health     4. Type 2 diabetes mellitus with diabetic peripheral angiopathy without gangrene, without long-term current use of insulin ([facility] [address]     5. Chronic ITP (idiopathic thrombocytopenia) ([facility])          Plan:      Return in about 3 months (around 6/1/2021) for hypertension. EKG shows normal sinus rhythm with mild axis deviation    Discussed with her that the palpitations could be coming from esophageal spasms or chest wall spasms. Is doubtful that they are from her heart since her pulses been fine at home with the symptoms. Since she is so anxious about her blood pressure at home I suggest she not check her blood pressure at home anymore. Check blood pressure here with a nurse visit in 2 to 3 weeks and office visit in 3 months. She could consider taking Procardia XL 30 mg for esophageal spasms, we discussed the pros and cons of this and she decided not to take any additional medication. Reviewed all her over-the-counter medication she is taking and all the seem to be appropriate  No orders of the defined types were placed in this encounter. No orders of the defined types were placed in this encounter. Patient given educationalmaterials - see patient instructions. Discussed use, benefit, and side effectsof prescribed medications. All patient questions answered. Pt voiced understanding. Reviewed health maintenance. Instructed to continue current medications, diet. Patient agreed with treatment plan. Follow up as directed.      Electronicallysigned by Monika Villafana MD on 3/1/2021 at 4:48 PM

## 2021-03-01 NOTE — PATIENT INSTRUCTIONS
Patient Education        Palpitations: Care Instructions  Your Care Instructions     Heart palpitations are the uncomfortable sensation that your heart is beating fast or irregularly. You might feel pounding or fluttering in your chest. It might feel like your heart is skipping a beat. Although palpitations may be caused by a heart problem, they also occur because of stress, fatigue, or use of alcohol, caffeine, or nicotine. Many medicines, including diet pills, antihistamines, decongestants, and some herbal products, can cause heart palpitations. Nearly everyone has palpitations from time to time. Depending on your symptoms, your doctor may need to do more tests to try to find the cause of your palpitations. Follow-up care is a key part of your treatment and safety. Be sure to make and go to all appointments, and call your doctor if you are having problems. It's also a good idea to know your test results and keep a list of the medicines you take. How can you care for yourself at home? · Avoid caffeine, nicotine, and excess alcohol. · Do not take illegal drugs, such as methamphetamines and cocaine. · Do not take weight loss or diet medicines unless you talk with your doctor first.  · Get plenty of sleep. · Do not overeat. · If you have palpitations again, take deep breaths and try to relax. This may slow a racing heart. · If you start to feel lightheaded, lie down to avoid injuries that might result if you pass out and fall down. · Keep a record of your palpitations and bring it to your next doctor's appointment. Write down:  ? The date and time. ? Your pulse. (If your heart is beating fast, it may be hard to count your pulse.)  ? What you were doing when the palpitations started. ? How long the palpitations lasted. ? Any other symptoms. · If an activity causes palpitations, slow down or stop. Talk to your doctor before you do that activity again. · Take your medicines exactly as prescribed. Call your doctor if you think you are having a problem with your medicine. When should you call for help? Call 911 anytime you think you may need emergency care. For example, call if:    · You passed out (lost consciousness).     · You have symptoms of a heart attack. These may include:  ? Chest pain or pressure, or a strange feeling in the chest.  ? Sweating. ? Shortness of breath. ? Pain, pressure, or a strange feeling in the back, neck, jaw, or upper belly or in one or both shoulders or arms. ? Lightheadedness or sudden weakness. ? A fast or irregular heartbeat. After you call 911, the  may tell you to chew 1 adult-strength or 2 to 4 low-dose aspirin. Wait for an ambulance. Do not try to drive yourself.     · You have symptoms of a stroke. These may include:  ? Sudden numbness, tingling, weakness, or loss of movement in your face, arm, or leg, especially on only one side of your body. ? Sudden vision changes. ? Sudden trouble speaking. ? Sudden confusion or trouble understanding simple statements. ? Sudden problems with walking or balance. ? A sudden, severe headache that is different from past headaches. Call your doctor now or seek immediate medical care if:    · You have heart palpitations and:  ? Are dizzy or lightheaded, or you feel like you may faint. ? Have new or increased shortness of breath. Watch closely for changes in your health, and be sure to contact your doctor if:    · You continue to have heart palpitations. Where can you learn more? Go to [email or web address] and sign in to your [employer] account. Enter R508 in the [facility] box to learn more about \"Palpitations: Care Instructions. \"     If you do not have an account, please click on the \"Sign Up Now\" link. Current as of: December 16, 2019               Content Version: 12.6  © 4042-5441 [employer]. Care instructions adapted under license by [facility] ([facility]). If you have questions about a medical condition or this instruction, always ask your healthcare professional. [address] any warranty or liability for your use of this information.

## 2021-03-02 ENCOUNTER — OFFICE VISIT (OUTPATIENT)
Dept: PODIATRY | Age: 81
End: 2021-03-02
Payer: MEDICARE

## 2021-03-02 VITALS — WEIGHT: 159 LBS | BODY MASS INDEX: 32.05 KG/M2 | HEIGHT: 59 IN

## 2021-03-02 DIAGNOSIS — E11.51 TYPE 2 DIABETES MELLITUS WITH PERIPHERAL VASCULAR DISEASE (HCC): ICD-10-CM

## 2021-03-02 DIAGNOSIS — M79.674 PAIN OF TOES OF BOTH FEET: ICD-10-CM

## 2021-03-02 DIAGNOSIS — M79.675 PAIN OF TOES OF BOTH FEET: ICD-10-CM

## 2021-03-02 DIAGNOSIS — B35.1 ONYCHOMYCOSIS OF TOENAIL: Primary | ICD-10-CM

## 2021-03-02 PROCEDURE — 11721 DEBRIDE NAIL 6 OR MORE: CPT | Performed by: PODIATRIST

## 2021-03-02 ASSESSMENT — ENCOUNTER SYMPTOMS
BACK PAIN: 0
SHORTNESS OF BREATH: 0
NAUSEA: 0
DIARRHEA: 0
COLOR CHANGE: 0

## 2021-03-02 NOTE — PROGRESS NOTES
SUBJECTIVE: Liliam Talavera is a 79 y.o. female who returns to the office with chief complaint of painful fungal toenails. Patient relates toe nails are thickened/difficult to trim as well as painful with ambulation and with shoe gear. Chief Complaint   Patient presents with    Nail Problem     Bilat Nail Trim / Last seen Jenny Perea MD 03/01/21    Diabetes          Review of Systems   Constitutional: Negative for activity change, appetite change, chills, diaphoresis, fatigue and fever. Respiratory: Negative for shortness of breath. Cardiovascular: Negative for leg swelling. Gastrointestinal: Negative for diarrhea and nausea. Endocrine: Negative for cold intolerance, heat intolerance and polyuria. Musculoskeletal: Positive for arthralgias. Negative for back pain, gait problem, joint swelling and myalgias. Skin: Negative for color change, pallor, rash and wound. Allergic/Immunologic: Negative for environmental allergies and food allergies. Neurological: Negative for dizziness, weakness, light-headedness and numbness. Hematological: Does not bruise/bleed easily. Psychiatric/Behavioral: Negative for behavioral problems, confusion and self-injury. The patient is not nervous/anxious. OBJECTIVE: Clinical evaluation of patient reveals nails 1,2,3,4,5 of the right foot and nails 1,2,3,4,5, of the left foot to present with thickness, elongation, discoloration, brittleness, and subungual debris. There was pain with palpation and debridement of the toenails of the bilateral feet. No open lesions noted to either foot today. The right DP pulse is palpable. The left DP pulse is not palpable. The right PT pulse is palpable. The left PT pulse is not palpable. Protective sensation is present to the right plantar foot as noted with a 5.07 [place] monofilament. Protective sensation is present to the left plantar foot as noted with a 5.07 [place]an monofilament.

## 2021-03-03 ENCOUNTER — HOSPITAL ENCOUNTER (OUTPATIENT)
Dept: PHYSICAL THERAPY | Age: 81
Setting detail: THERAPIES SERIES
Discharge: HOME | End: 2021-03-03
Payer: MEDICARE

## 2021-03-03 PROCEDURE — 97110 THERAPEUTIC EXERCISES: CPT

## 2021-03-03 PROCEDURE — 97140 MANUAL THERAPY 1/> REGIONS: CPT

## 2021-03-03 NOTE — FLOWSHEET NOTE
[facility] Outpatient Physical Therapy   [address]   Phone: [phone]   Fax: [phone]    Physical Therapy Daily Treatment Note      Date:  3/3/2021  Patient Name:  Stephany Yun    :  1940  MRN: [id number]  Physician: Starla Gonzalez MD     Insurance: Medicare with [employer] secondary. Visits BMN and in accordance with Medicare guidelines  Diagnosis: X79.609 (ICD-10-CM) - History of revision of total knee arthroplasty (LEFT)   Onset Date: 20   Next Dr. Nilsa Lincoln: As needed  Visit# / total visits: 3/10  Cancels/No Shows: 0/0  Next progress note due by visit #10 or 3/11/21    Subjective:  3/2/2021: Pt denies any pain and only c/o an ache in LLE. Pt reports that her LLE felt better post manual therapy last session. Pt also reports difficulty with performing HEP in bed and on couch at home d/t having a smaller sized couch and having a bed that too high and pt almost falling off bed. Pt states she is afraid to do HEP on the floor d/t having difficulty getting off the floor.     Pain:  [x] Yes  [] No Location: L knee Pain Rating: (0-10 scale) 1/10  Pain altered Tx:  [] No  [] Yes  Action:  Comments:     Objective:  Modalities:   Precautions: None  Exercises:  Exercise Reps/ Time Weight/ Level Comments Performed=x   Heel slides with red swiss ball 20 reps      SLRs 6 reps x 3   x   Bridges 10 reps x 2   x   NUSTEP 6' L4     Gastroc slant board stretch 30'' x 3  Bilateral    Supine HS stretch 5x 30\"   x                 Other: Manual therapy x 10'  -MFR to L VL- 3/3/ hypervolt  -hip flexor stretch and quad stretch over EOM with over pressure at ankle  -Posterior tibiofemoral glides on L (Grade III/IV)   -Manual knee flexion Specific Instructions for next treatment: STM as needed to L quads/hip flexors, L knee flexion ROM and lower quarter strengthening/stabilization as tolerated. 3/3: floor recovery to enhance pt's ability to perform HEP on the floor and be able to get up safely. Assessment: [] Progressing toward goals. [x] No change. [] Other:    [x] Patient would continue to benefit from skilled physical therapy services in order to: decrease pain, improve ROM and restore full strength/mobility at [facility]      3/3/2021:  Added hypervolt and hip flexor stretch over edge of bed on L hip. Pt attempting to perform bridging but kept getting cramps in B gastrocs. Added supine HS stretch with strap to assist with cramping. Cramping noted with SLR on LLE. Activity was very limited this session d/t cramping. Pt had relief with stretching and manual but would be easily aggravated with activity. Pt ed on gastroc towel stretch in long seated to be able to perform stretch at home. Frequent breaks needed d/t pt having cramping in BLEs. STG: (to be met in 5 treatments)  1. Pt will self report worst pain no greater than 2/10 in order to better tolerate ADLs/work activities with minimal dysfunction  2. Pt will improve AROM in L knee to 120 deg flexion in order to demonstrate ability to move/reach in all planes unrestricted at PLOF  3. Pt will improve FTSTS to <15 seconds with minimal use of UEs in order to demonstrate improved safety with all functional transitions  LTG: (to be met in 10 treatments)  1. Pt will demonstrate improved L LE strength to 4/5 or greater in order to demonstrate improved stability/strength necessary for unrestricted ADLs/work activities  2. Pt will improve KOOS to 10% impaired or less in order to demonstrate improved functional tolerances at PLOF with minimal restriction/dysfunction  3.  Pt will demonstrate independence with a long term HEP for continued progress/maintenance after completion of PT Pt. Education:  [x] Yes  [] No  [] Reviewed Prior HEP/Ed  Method of Education: [x] Verbal  [] Demo  [] Written  Comprehension of Education:  [x] Verbalizes understanding. [x] Demonstrates understanding. [] Needs review. [] Demonstrates/verbalizes HEP/Ed previously given. Plan: [x] Continue per plan of care.    [] Other:      Treatment Charges: Mins Units   []  Modalities     [x]  Ther Exercise 30 2   [x]  Manual Therapy 10 1   []  Ther Activities     []  Aquatics     []  Neuromuscular     [] Vasocompression     [] Gait Training     [] Dry needling        [] 1 or 2 muscles        [] 3 or more muscles     []  Other     Total Treatment time 40 3     Time In: 5319            Time Out: 0528    Electronically signed by:  Dallie Cockayne, PTA

## 2021-03-08 ENCOUNTER — HOSPITAL ENCOUNTER (OUTPATIENT)
Dept: PHYSICAL THERAPY | Age: 81
Setting detail: THERAPIES SERIES
Discharge: HOME | End: 2021-03-08
Payer: MEDICARE

## 2021-03-08 PROCEDURE — 97110 THERAPEUTIC EXERCISES: CPT

## 2021-03-08 PROCEDURE — 97140 MANUAL THERAPY 1/> REGIONS: CPT

## 2021-03-08 NOTE — FLOWSHEET NOTE
would continue to benefit from skilled physical therapy services in order to: decrease pain, improve ROM and restore full strength/mobility at [facility]      3/8/2021:  Initiated treatment with manual with patient reported benefit. Cont with exercises per chart with focus on improving LLE strength and ROM. Added standing exercises this date to progress functional strengthening program. Patient reported intermittent cramping in BLE with mat exercises; cramping decreased with rigo stretching. Patient with good tolerance to exercises overall, reporting slight fatigue post.     STG: (to be met in 5 treatments)  1. Pt will self report worst pain no greater than 2/10 in order to better tolerate ADLs/work activities with minimal dysfunction  2. Pt will improve AROM in L knee to 120 deg flexion in order to demonstrate ability to move/reach in all planes unrestricted at PLOF  3. Pt will improve FTSTS to <15 seconds with minimal use of UEs in order to demonstrate improved safety with all functional transitions  LTG: (to be met in 10 treatments)  1. Pt will demonstrate improved L LE strength to 4/5 or greater in order to demonstrate improved stability/strength necessary for unrestricted ADLs/work activities  2. Pt will improve KOOS to 10% impaired or less in order to demonstrate improved functional tolerances at PLOF with minimal restriction/dysfunction  3. Pt will demonstrate independence with a long term HEP for continued progress/maintenance after completion of PT    Pt. Education:  [x] Yes  [] No  [x] Reviewed Prior HEP/Ed  Method of Education: [x] Verbal  [] Demo  [] Written  Comprehension of Education:  [x] Verbalizes understanding. [x] Demonstrates understanding. [] Needs review. [] Demonstrates/verbalizes HEP/Ed previously given. Plan: [x] Continue per plan of care.    [] Other:      Treatment Charges: Mins Units   []  Modalities     [x]  Ther Exercise 30 2   [x]  Manual Therapy 10 1   []  Ther Activities     [] Aquatics     []  Neuromuscular     [] Vasocompression     [] Gait Training     [] Dry needling        [] 1 or 2 muscles        [] 3 or more muscles     []  Other     Total Treatment time 40 3     Time In: 6231            Time Out: 7431    Electronically signed by:  Alyssa Padilla PTA

## 2021-03-10 ENCOUNTER — HOSPITAL ENCOUNTER (OUTPATIENT)
Dept: PHYSICAL THERAPY | Age: 81
Setting detail: THERAPIES SERIES
Discharge: HOME | End: 2021-03-10
Payer: MEDICARE

## 2021-03-10 PROCEDURE — 97110 THERAPEUTIC EXERCISES: CPT

## 2021-03-10 PROCEDURE — 97140 MANUAL THERAPY 1/> REGIONS: CPT

## 2021-03-10 NOTE — FLOWSHEET NOTE
[facility]   [address]   Phone: [phone]   Fax: [phone]    Physical Therapy Daily Treatment Note      Date:  3/10/2021  Patient Name:  Oksana Harris    :  1940  MRN: [id number]  Physician: Mayra Colbert MD     Insurance: Medicare with [employer] secondary. Visits BMN and in accordance with Medicare guidelines  Diagnosis: L53.173 (ICD-10-CM) - History of revision of total knee arthroplasty (LEFT)   Onset Date: 20   Next Dr. Candice Weiots: As needed  Visit# / total visits: 4/10  Cancels/No Shows: 0/0  Next progress note due by visit #10 or 3/11/21    Subjective:  3/10/2021: Patient reports today that knee feels \"great. \" Patient denied any pain today. Pain:  [] Yes  [x] No Location: L knee Pain Rating: (0-10 scale) 0/10  Pain altered Tx:  [] No  [] Yes  Action:  Comments:     Objective:  Modalities:   Precautions: None  Exercises:  Exercise Reps/ Time Weight/ Level Comments Performed=x   Heel slides with red swiss ball 20 reps   x   SLRs 2x10   x   Bridges 10 reps x 2   x   NUSTEP 6' L4  x   Gastroc slant board stretch 30'' x 3  Bilateral x   Supine HS stretch 5x 30\"             Squats  15x   x   3-way hip 10x  Orange Each leg x   Step Ups  2x10 6\" Forward/Lateral x   Heel Taps Forward/Lateral 10x 4\" Min to Mod UE A  x          Other: Manual therapy x 10'  -MFR to L VL- 3/3/ hypervolt- held today  -hip flexor stretch and quad stretch side-lying  -Posterior tibiofemoral glides on L (Grade III/IV)   -Manual knee flexion    Specific Instructions for next treatment: STM as needed to L quads/hip flexors, L knee flexion ROM and lower quarter strengthening/stabilization as tolerated. 3/3: floor recovery to enhance pt's ability to perform HEP on the floor and be able to get up safely. Assessment: [x] Progressing toward goals. [] No change.      [] Other:    [x] Patient would continue to benefit from skilled physical therapy services in order to: decrease pain, improve ROM and restore full strength/mobility at [facility]      3/10/2021: Hip flexor/quad stretch at edge of table caused increased pain, performed side-lying with improved tolerance. Assessed soft tissue mobility of L VL, patient with improved mobility compared to prev visit and withheld STM to area. Cont with exercises per chart with focus on improving L LE strength. Added heel taps for additional LE strengthening. Patient required Min to Mod UE A, but was able to perform exercise correctly with assist. Patient with notable fatigue post exercises. STG: (to be met in 5 treatments)  1. Pt will self report worst pain no greater than 2/10 in order to better tolerate ADLs/work activities with minimal dysfunction  2. Pt will improve AROM in L knee to 120 deg flexion in order to demonstrate ability to move/reach in all planes unrestricted at PLOF  3. Pt will improve FTSTS to <15 seconds with minimal use of UEs in order to demonstrate improved safety with all functional transitions  LTG: (to be met in 10 treatments)  1. Pt will demonstrate improved L LE strength to 4/5 or greater in order to demonstrate improved stability/strength necessary for unrestricted ADLs/work activities  2. Pt will improve KOOS to 10% impaired or less in order to demonstrate improved functional tolerances at PLOF with minimal restriction/dysfunction  3. Pt will demonstrate independence with a long term HEP for continued progress/maintenance after completion of PT    Pt. Education:  [x] Yes  [] No  [x] Reviewed Prior HEP/Ed  Method of Education: [x] Verbal  [] Demo  [] Written  Comprehension of Education:  [x] Verbalizes understanding. [x] Demonstrates understanding. [] Needs review. [] Demonstrates/verbalizes HEP/Ed previously given. Plan: [x] Continue per plan of care.    [] Other:      Treatment Charges: Mins Units   []  Modalities     [x]  Ther Exercise 30 2   [x]  Manual Therapy 10 1   []  Ther Activities     [] Aquatics     []  Neuromuscular     [] Vasocompression     [] Gait Training     [] Dry needling        [] 1 or 2 muscles        [] 3 or more muscles     []  Other     Total Treatment time 40 3     Time In: 5182            Time Out: 9886    Electronically signed by:  Jennifer Ellison PTA

## 2021-03-15 ENCOUNTER — HOSPITAL ENCOUNTER (OUTPATIENT)
Dept: PHYSICAL THERAPY | Age: 81
Setting detail: THERAPIES SERIES
Discharge: HOME | End: 2021-03-15
Payer: MEDICARE

## 2021-03-15 PROCEDURE — 97110 THERAPEUTIC EXERCISES: CPT

## 2021-03-15 NOTE — PROGRESS NOTES
[facility] Outpatient Physical Therapy   [facility]   Phone: [phone]   Fax: [phone]    Physical Therapy Progress Note      Date:  3/15/2021  Patient Name:  Jane Beaver    :  1940  MRN: [id number]  Physician: Son Ortega MD     Insurance: Medicare with [employer] secondary. Visits BMN and in accordance with Medicare guidelines  Diagnosis: Q06.873 (ICD-10-CM) - History of revision of total knee arthroplasty (LEFT)   Onset Date: 20   Next Dr. Pelletier Ice: As needed  Visit# / total visits: 6/10  Cancels/No Shows: 0/0  Next progress note due by visit #10 or 21    Subjective:  ay.      Pain:  [] Yes  [x] No Location: L knee Pain Rating: (0-10 scale) 0/10  Pain altered Tx:  [] No  [] Yes  Action:  Comments: 3/15: Pt arrived over 20 minutes late to her appt today and states that she got messed up by the time change. States that her knee has been feeling better with no recent pain, just continued stiffness. Still feels very weak and finds it difficult to lift either of her legs to get in/out of the car or for dressings tasks.     Objective:      Range of Motion  Left Range of Motion  Right Strength  Left Strength  Right   Hip Flexion   3+/5 3+/5   Hip Abduction   4-/5 4-/5   Hip Adduction   4/5 4/5   Hip Extension   3+/5 4/5   Hip ER       Hip IR       Knee Flexion 115 / 118 120 4-/5 4/5   Knee Extension   4-/5 4/5   Dorsiflexion       Plantar flexion       Inversion       Eversion         FTSTS: 22 seconds from chair without UE support    Modalities:   Precautions: None  Exercises:  Exercise Reps/ Time Weight/ Level Comments Performed=x   Heel slides with red swiss ball 20 reps      SLRs 2x10   x   Bridges 10 reps x 2      NUSTEP 6' L4     Gastroc slant board stretch 30'' x 3  Bilateral    Supine HS stretch 5x 30\"             Squats  15x      3-way hip 10x  Orange Each leg    Step Ups  2x10 6\" Forward/Lateral    Heel Taps Forward/Lateral 10x 4\" Min to Mod UE A Other: Manual therapy x 10' - not performed today  -MFR to L VL- 3/3/ hypervolt- held today  -hip flexor stretch and quad stretch side-lying  -Posterior tibiofemoral glides on L (Grade III/IV)   -Manual knee flexion    Re-evaluation of all subjective & objective measures- billed as therex    Specific Instructions for next treatment: STM as needed to L quads/hip flexors, L knee flexion ROM and lower quarter strengthening/stabilization as tolerated. 3/3: floor recovery to enhance pt's ability to perform HEP on the floor and be able to get up safely. Assessment: [x] Progressing toward goals. [] No change. [] Other:    [x] Patient would continue to benefit from skilled physical therapy services in order to: decrease pain, improve ROM and restore full strength/mobility at [facility]      3/15: Pt has been seen for 5 PT visits to date with good initial improvement in sx and mobility. Pt denies any recent pain and states that she's been walking much better, but still feels very stiff and weak in her L leg. Improved AROM in L knee but continues to lack full available flexion in comparison to her contralateral LE. Also demonstrates good improvement in FTSTS with pt able to complete test without UE support for the first time. Skilled PT intervention continues to be warranted to maximize full functional strength, ROM and mobility to her prior level of function. Good continued rehab potential based on progress to date. Shortened session today secondary to pt arriving late. STG: (to be met in 5 treatments)  1. Pt will self report worst pain no greater than 2/10 in order to better tolerate ADLs/work activities with minimal dysfunction GOAL MET 3/15  2. Pt will improve AROM in L knee to 120 deg flexion in order to demonstrate ability to move/reach in all planes unrestricted at  [address] POC 3/10  3.  Pt will improve FTSTS to <15 seconds with minimal use of UEs in order to demonstrate improved safety with all functional transitions GOAL PROGRESSING AND EXTENDED THROUGH [address] POC 3/10  LTG: (to be met in 10 treatments)  1. Pt will demonstrate improved L LE strength to 4/5 or greater in order to demonstrate improved stability/strength necessary for unrestricted ADLs/work activities GOAL PROGRESSING 3/10  2. Pt will improve KOOS to 10% impaired or less in order to demonstrate improved functional tolerances at PLOF with minimal restriction/dysfunction  3. Pt will demonstrate independence with a long term HEP for continued progress/maintenance after completion of PT GOAL PROGRESSING 3/10    Pt. Education:  [x] Yes  [] No  [x] Reviewed Prior HEP/Ed  Method of Education: [x] Verbal  [] Demo  [] Written  Comprehension of Education:  [x] Verbalizes understanding. [x] Demonstrates understanding. [] Needs review. [] Demonstrates/verbalizes HEP/Ed previously given.      Plan: [x] Continue per plan of care (No additional visits added to POC at this time)   [] Other:        Treatment Charges: Mins Units   []  Modalities     [x]  Ther Exercise 24 2   []  Manual Therapy     []  Ther Activities     []  Aquatics     []  Neuromuscular     [] Vasocompression     [] Gait Training     [] Dry needling        [] 1 or 2 muscles        [] 3 or more muscles     []  Other     Total Treatment time 24 2     Time In: 10:22am            Time Out: 10:46am    Electronically signed by:  Arthur Brush PT

## 2021-03-16 ENCOUNTER — OFFICE VISIT (OUTPATIENT)
Dept: GASTROENTEROLOGY | Age: 81
End: 2021-03-16
Payer: MEDICARE

## 2021-03-16 VITALS
TEMPERATURE: 97.9 F | DIASTOLIC BLOOD PRESSURE: 74 MMHG | SYSTOLIC BLOOD PRESSURE: 126 MMHG | HEART RATE: 73 BPM | WEIGHT: 159.1 LBS | HEIGHT: 59 IN | OXYGEN SATURATION: 96 % | BODY MASS INDEX: 32.08 KG/M2

## 2021-03-16 DIAGNOSIS — K58.1 IRRITABLE BOWEL SYNDROME WITH CONSTIPATION: Primary | ICD-10-CM

## 2021-03-16 PROCEDURE — 1036F TOBACCO NON-USER: CPT | Performed by: NURSE PRACTITIONER

## 2021-03-16 PROCEDURE — G8417 CALC BMI ABV UP PARAM F/U: HCPCS | Performed by: NURSE PRACTITIONER

## 2021-03-16 PROCEDURE — G8400 PT W/DXA NO RESULTS DOC: HCPCS | Performed by: NURSE PRACTITIONER

## 2021-03-16 PROCEDURE — 99213 OFFICE O/P EST LOW 20 MIN: CPT | Performed by: NURSE PRACTITIONER

## 2021-03-16 PROCEDURE — G8484 FLU IMMUNIZE NO ADMIN: HCPCS | Performed by: NURSE PRACTITIONER

## 2021-03-16 PROCEDURE — G8427 DOCREV CUR MEDS BY ELIG CLIN: HCPCS | Performed by: NURSE PRACTITIONER

## 2021-03-16 PROCEDURE — 1090F PRES/ABSN URINE INCON ASSESS: CPT | Performed by: NURSE PRACTITIONER

## 2021-03-16 PROCEDURE — 4040F PNEUMOC VAC/ADMIN/RCVD: CPT | Performed by: NURSE PRACTITIONER

## 2021-03-16 PROCEDURE — 1123F ACP DISCUSS/DSCN MKR DOCD: CPT | Performed by: NURSE PRACTITIONER

## 2021-03-16 RX ORDER — PLECANATIDE 3 MG/1
3 TABLET ORAL DAILY
Qty: 60 TABLET | Refills: 3 | Status: SHIPPED | OUTPATIENT
Start: 2021-03-16 | End: 2021-07-27

## 2021-03-16 ASSESSMENT — ENCOUNTER SYMPTOMS
APNEA: 0
BACK PAIN: 1
CHOKING: 0
BLOOD IN STOOL: 0
NAUSEA: 0
SHORTNESS OF BREATH: 0
SINUS PAIN: 0
RECTAL PAIN: 0
COUGH: 0
CONSTIPATION: 1
ANAL BLEEDING: 0
SINUS PRESSURE: 0
VOMITING: 0
VOICE CHANGE: 0
TROUBLE SWALLOWING: 0
WHEEZING: 0
DIARRHEA: 0
ABDOMINAL DISTENTION: 1
ABDOMINAL PAIN: 1
SORE THROAT: 0

## 2021-03-16 NOTE — PROGRESS NOTES
GI CLINIC FOLLOW UP    INTERVAL HISTORY:   No referring provider defined for this encounter. Chief Complaint   Patient presents with    Constipation     Patient is here today due to constipation. Patient states that the medication is not helping. HISTORY OF PRESENT ILLNESS:     Patient being seen for constipation follow-up. Current regimen includes daily Benefiber, PEG preparation. Also takes dulcolax PRN. Patient reports chronic constipation for the last several years. She reports straining at bowel movements. Has hemorrhoids. Occasionally has BRBPR, scant amount on tissue. Patient had colonoscopy in 2019. No strictures noted. Patient currently reports fair appetite. There is not weight loss. Has associated bloating, generalized abdominal discomfort. Denies dysphagia, odynophagia, dyspeptic symptoms. Past Medical,Family, and Social History reviewed and does contribute to the patient presentingcondition. Patient's PMH/PSH,SH,PSYCH Hx, MEDs, ALLERGIES, and ROS were all reviewed and updated in the appropriate sections. PAST MEDICAL HISTORY:  Past Medical History:   Diagnosis Date    Age-related cognitive decline     Ankle pain     Left ankle fracture in ortho boat.    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    left with urinary incontinenc post surgical    Caffeine use     2 coffee/day, 1-2 tea per week    Chronic back pain     Chronic ITP (idiopathic thrombocytopenia) (HCC)     CVA (cerebral infarction) 2008, 2010    balance issues, short term memory loss    Diabetic gastroparesis ([address]     Diverticular disease 1986    GERD (gastroesophageal reflux disease)     Hiatal hernia     Hip fracture (HCC)     History of blood transfusion     History of decreased platelet count     Hyperlipemia     Hypertension     Internal hemorrhoid     Macular degeneration of left eye 1980's    S/P laser treatment    Nausea and vomiting     Neuropathy     Osteoarthritis     Ringing in ears     Self-catheterizes urinary bladder     6-7 times daily    TIA (transient ischemic attack) 2000    x1    Tubular adenoma     colon polyps    Type II or unspecified type diabetes mellitus without mention of complication, not stated as uncontrolled     Urinary incontinence     frequent UTI s/p infection, surgical dilatation lead to incontinence    Wears dentures     full on top, partial on bottom    Wears glasses        Past Surgical History:   Procedure Laterality Date    APPENDECTOMY  1962    BLADDER SURGERY      bladder stimulator    BLADDER SUSPENSION  2002    multiple    CARDIAC CATHETERIZATION  2008    no stents    CARDIOVASCULAR STRESS TEST  12/2014    CATARACT REMOVAL WITH IMPLANT Left 11/07/2017    [facility]    CATARACT REMOVAL WITH IMPLANT Right 11/28/2017    [facility]    COLON SURGERY      colostomy reversal    COLONOSCOPY  04/07/2016    tubular adenoma x3; internal hemorrhoids    COLONOSCOPY N/A 11/06/2019    COLONOSCOPY DIAGNOSTIC performed by Olya Skaggs MD at [address] [place] [address]  11/06/2019    COLOSTOMY  1986    bowel obstruction/ rupture from diverticular disease, reversal 3 mos later    CYSTOSCOPY  09/08/2017    W/ 200IU Botox     FRACTURE SURGERY Right 2011    hip    FRACTURE SURGERY Left 2001    [facility]    FRACTURE SURGERY Left 2013    ankle    HERNIA REPAIR      HYSTERECTOMY  1969    JOINT REPLACEMENT Bilateral 2000    BILAT KNEES    LUMBAR FUSION  2017    L2/L3    ID XCAPSL CTRC RMVL INSJ IO LENS PROSTH W/O ECP Left 11/07/2017    EYE CATARACT EMULSIFICATION IOL IMPLANT performed by Jennifer Radford MD at [facility] 60 RMVL INSJ IO LENS PROSTH W/O ECP Right 11/28/2017    EYE CATARACT EMULSIFICATION IOL IMPLANT performed by Jennifer Radford MD at [facility] Right 10/27/2015    WITH POLY rectally 2 times daily, Disp: 28 g, Rfl: 3    vitamin B-12 (CYANOCOBALAMIN) 1000 MCG tablet, Take 1 tablet by mouth once a week, Disp: 30 tablet, Rfl: 3    tiZANidine (ZANAFLEX) 4 MG tablet, Take 1 tablet by mouth 4 times daily as needed (Muscle spasms), Disp: 40 tablet, Rfl: 0    aspirin 81 MG EC tablet, Take 1 tablet by mouth 2 times daily, Disp: 30 tablet, Rfl: 3    nystatin-triamcinolone (MYCOLOG II) 937475-8.1 UNIT/GM-% cream, Apply topically 4 times daily Apply topically 4 times daily. , Disp: 30 g, Rfl: 3    rOPINIRole (REQUIP) 1 MG tablet, Take by mouth nightly , Disp: , Rfl:     glipiZIDE (GLUCOTROL) 5 MG tablet, 2.5mg in the am and 2.5 mg in the pm, Disp: , Rfl:     Continuous Blood Gluc Sensor (FREESTYLE DAVIS 14 DAY SENSOR) MISC, , Disp: , Rfl:     Salicylic Acid (COMPOUND W EX), , Disp: , Rfl:     LIVALO 2 MG TABS tablet, Take 2 mg by mouth nightly , Disp: , Rfl:     Multiple Vitamins-Minerals (THERAPEUTIC MULTIVITAMIN-MINERALS) tablet, Take 1 tablet by mouth daily, Disp: , Rfl:     Multiple Vitamins-Minerals (PRESERVISION AREDS PO), Take by mouth daily , Disp: , Rfl:     Continuous Blood Gluc  (FREESTYLE DAVIS 14 DAY READER) MATTHEW, , Disp: , Rfl:     donepezil (ARICEPT) 10 MG tablet, Take 10 mg by mouth nightly , Disp: , Rfl:     estradiol (ESTRACE VAGINAL) 0.1 MG/GM vaginal cream, Place 1 g vaginally Twice a Week (Patient taking differently: Place 1 g vaginally every 14 days ), Disp: 1 Tube, Rfl: 5    FARXIGA 10 MG tablet, TAKE 1 TABLET BY MOUTH IN THE MORNING , Disp: 30 tablet, Rfl: 11    CRANBERRY PO, Take by mouth 2 times daily, Disp: , Rfl:     ALLERGIES:   Allergies   Allergen Reactions    Iodides Anaphylaxis, Hives and Itching     \"Contrast dyes\"  This was added by someone but Patient states has had IVP dyes multiple times without problems and had a myelogram in Dec 2016 without problems    Povidone-Iodine Anaphylaxis, Hives and Swelling    Sulfa Antibiotics Hives and Swelling     Other reaction(s): Unknown    Betadine [Povidone Iodine] Hives    Cephalexin Hives and Swelling    Cephalexin Itching     Other reaction(s): Hallucinations  In  received demerol and keflex together so was told to list both as allergies    Cozaar [Losartan] Nausea Only     Pt also gets sores on toungue    Cymbalta [Duloxetine Hcl] Diarrhea and Nausea And Vomiting     Weakness    Demerol Hives and Swelling    Doxycycline Other (See Comments)     Sore mouth        Meperidine Itching     Other reaction(s): Hallucinations      Morphine Hives and Itching    Penicillins Hives, Swelling and Itching     Other reaction(s): Intolerance-unknown  Other reaction(s): Unknown    Zithromax [Azithromycin] Other (See Comments)     Sore mouth      Clindamycin/Lincomycin     Etodolac     Fluorescein     Iodine      Other reaction(s):  Intolerance-unknown    Lisinopril      cough    Penicillin G     Soap     Toviaz [Fesoterodine Fumarate Er]     Metformin And Related Nausea And Vomiting     Diarrhea and N/V       FAMILY HISTORY:       Problem Relation Age of Onset    Breast Cancer Mother     Cancer Mother         breast and uterine  39    Heart Disease Father     Heart Attack Father          28   [address] [facility] Maternal Grandmother     Cancer Brother 46        bronchogenic adenocarcinoma cancer    Lung Cancer Brother     Cancer Sister         lung    Lung Cancer Sister          72    Breast Cancer Sister          62    Cancer Sister         breast         SOCIAL HISTORY:   Social History     Socioeconomic History    Marital status:      Spouse name: Not on file    Number of children: Not on file    Years of education: Not on file    Highest education level: Not on file   Occupational History    Occupation: retired   Social Needs    Financial resource strain: Not very hard    Food insecurity     Worry: Never true     Inability: Never true   [facility] Transportation needs     Medical: No     Non-medical: No   Tobacco Use    Smoking status: Former Smoker     Packs/day: 0.50     Years: 20.00     Pack years: 10.00     Quit date: 1982     Years since quittin.8    Smokeless tobacco: Former User     Quit date: 1982   Substance and Sexual Activity    Alcohol use: No    Drug use: No    Sexual activity: Not on file   Lifestyle    Physical activity     Days per week: Not on file     Minutes per session: Not on file    Stress: Not on file   Relationships    Social connections     Talks on phone: Not on file     Gets together: Not on file     Attends Islam service: Not on file     Active member of club or organization: Not on file     Attends meetings of clubs or organizations: Not on file     Relationship status: Not on file    Intimate partner violence     Fear of current or ex partner: Not on file     Emotionally abused: Not on file     Physically abused: Not on file     Forced sexual activity: Not on file   Other Topics Concern    Not on file   Social History Narrative    Not on file       REVIEW OF SYSTEMS: A 12-point review of systemswas obtained and pertinent positives and negatives were enumerated above in the history of present illness. All other reviewed systems / symptoms were negative. Review of Systems   Constitutional: Positive for fatigue. Negative for appetite change and unexpected weight change. HENT: Negative for postnasal drip, sinus pressure, sinus pain, sore throat, trouble swallowing and voice change. Eyes: Positive for visual disturbance (Glasses). Respiratory: Negative for apnea, cough, choking, shortness of breath and wheezing. Gastrointestinal: Positive for abdominal distention, abdominal pain and constipation. Negative for anal bleeding, blood in stool, diarrhea, nausea, rectal pain and vomiting. Genitourinary: Negative for difficulty urinating.    Musculoskeletal: Positive for arthralgias, back pain NOT REPORTED 12/10/2020    BASOPCT 1 06/30/2020    LYMPHSABS 1.22 06/30/2020    MONOSABS 0.46 06/30/2020    NEUTROABS 3.17 06/30/2020    EOSABS 0.20 06/30/2020    BASOSABS 0.05 06/30/2020         DIAGNOSTIC TESTING:     No results found. IMPRESSION: Ms. Pratik Forte is a 79 y.o. female with    Diagnosis Orders   1. Irritable bowel syndrome with constipation       Patient has been on high fiber regimen and PRN laxatives with little benefit. Will trial her on Tr[facility]. Would like to trial Amitiza but this does not appear to be covered by the patient's insurance. To follow-up in 6 weeks. Thank you for allowing me to participate in the care of Ms. Pratik Forte. For any further questions please do not hesitate to contact me. I have reviewed and agree with the ROS entered by the MA/KAELN.          Santiago Baumgarten, NP-C    [facility]  Office #: [phone]

## 2021-03-17 ENCOUNTER — HOSPITAL ENCOUNTER (OUTPATIENT)
Dept: PHYSICAL THERAPY | Age: 81
Setting detail: THERAPIES SERIES
Discharge: HOME | End: 2021-03-17
Payer: MEDICARE

## 2021-03-17 NOTE — FLOWSHEET NOTE
[x] [facility]  [address]  [place]: [phone]   F: [phone]     Physical Therapy Cancel/No Show note    Date: 3/17/2021  Patient:  Pippa Robbins  : 1940  MRN: [id number]    Visit Count: 6/10  Cancels/No Shows to date:     For today's appointment patient:    [x]  Cancelled    [] Rescheduled appointment    [] No-show     Reason given by patient:    []  Patient ill    []  Conflicting appointment    [] No transportation      [] Conflict with work    [] No reason given    [] Weather related    [] COVID-19    [x] Other:      Comments:  Personal conflict with [id number] vaccine      [x] Next appointment was confirmed    Electronically signed by: Rosa Ferrara, PT

## 2021-03-18 ENCOUNTER — OFFICE VISIT (OUTPATIENT)
Dept: PRIMARY CARE CLINIC | Age: 81
End: 2021-03-18
Payer: MEDICARE

## 2021-03-18 VITALS
WEIGHT: 159.4 LBS | SYSTOLIC BLOOD PRESSURE: 114 MMHG | TEMPERATURE: 98 F | HEIGHT: 59 IN | BODY MASS INDEX: 32.13 KG/M2 | OXYGEN SATURATION: 96 % | DIASTOLIC BLOOD PRESSURE: 82 MMHG | HEART RATE: 87 BPM

## 2021-03-18 DIAGNOSIS — I10 ESSENTIAL HYPERTENSION: Primary | ICD-10-CM

## 2021-03-18 PROCEDURE — 1036F TOBACCO NON-USER: CPT | Performed by: FAMILY MEDICINE

## 2021-03-18 PROCEDURE — 99213 OFFICE O/P EST LOW 20 MIN: CPT | Performed by: FAMILY MEDICINE

## 2021-03-18 PROCEDURE — 1123F ACP DISCUSS/DSCN MKR DOCD: CPT | Performed by: FAMILY MEDICINE

## 2021-03-18 PROCEDURE — G8417 CALC BMI ABV UP PARAM F/U: HCPCS | Performed by: FAMILY MEDICINE

## 2021-03-18 PROCEDURE — 4040F PNEUMOC VAC/ADMIN/RCVD: CPT | Performed by: FAMILY MEDICINE

## 2021-03-18 PROCEDURE — G8400 PT W/DXA NO RESULTS DOC: HCPCS | Performed by: FAMILY MEDICINE

## 2021-03-18 PROCEDURE — G8427 DOCREV CUR MEDS BY ELIG CLIN: HCPCS | Performed by: FAMILY MEDICINE

## 2021-03-18 PROCEDURE — 1090F PRES/ABSN URINE INCON ASSESS: CPT | Performed by: FAMILY MEDICINE

## 2021-03-18 PROCEDURE — G8484 FLU IMMUNIZE NO ADMIN: HCPCS | Performed by: FAMILY MEDICINE

## 2021-03-18 ASSESSMENT — ENCOUNTER SYMPTOMS: RESPIRATORY NEGATIVE: 1

## 2021-03-18 NOTE — PROGRESS NOTES
[facility]  [address] [address] [address]  Dept: [id number] [facility] Calvin Cantu is a 79 y.o. female Established patient, who presents today for her medical conditions/complaintsas noted below. Chief Complaint   Patient presents with    Hypertension     F/u    Medication Check       HPI:     HPI    Just started new bowel med from trulance from GI  Had her 2nd covid shot. Feels ok but for the bowels. Sees Dr Mian Nichols for Diabetes. Reviewed prior notes None  Reviewed previous Labs    LDL Cholesterol (mg/dL)   Date Value   07/06/2020 85   10/17/2018 137 (H)   07/16/2018 64     LDL Calculated (mg/dL)   Date Value   03/12/2015 47   08/18/2014 39       (goal LDL is <100)   AST (U/L)   Date Value   12/10/2020 19     ALT (U/L)   Date Value   12/10/2020 18     BUN (mg/dL)   Date Value   12/10/2020 17     Hemoglobin A1C (%)   Date Value   12/15/2020 6.7     TSH (uIU/mL)   Date Value   06/02/2016 1.800     BP Readings from Last 3 Encounters:   03/18/21 114/82   03/16/21 126/74   03/01/21 (!) 140/90          (goal 120/80)    Past Medical History:   Diagnosis Date    Age-related cognitive decline     Ankle pain     Left ankle fracture in ortho boat.    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    left with urinary incontinenc post surgical    Caffeine use     2 coffee/day, 1-2 tea per week    Chronic back pain     Chronic ITP (idiopathic thrombocytopenia) (HCC)     CVA (cerebral infarction) 2008, 2010    balance issues, short term memory loss    Diabetic gastroparesis ([address]     Diverticular disease 1986    GERD (gastroesophageal reflux disease)     Hiatal hernia     Hip fracture (HCC)     History of blood transfusion     History of decreased platelet count     Hyperlipemia     Hypertension     Internal hemorrhoid     Macular degeneration of left eye 1980's    S/P laser treatment    Nausea and vomiting     Neuropathy     Osteoarthritis     Ringing in ears     Self-catheterizes urinary bladder     6-7 times daily    TIA (transient ischemic attack) 2000    x1    Tubular adenoma     colon polyps    Type II or unspecified type diabetes mellitus without mention of complication, not stated as uncontrolled     Urinary incontinence     frequent UTI s/p infection, surgical dilatation lead to incontinence    Wears dentures     full on top, partial on bottom    Wears glasses       Past Surgical History:   Procedure Laterality Date    APPENDECTOMY  1962    BLADDER SURGERY      bladder stimulator    BLADDER SUSPENSION  2002    multiple    CARDIAC CATHETERIZATION  2008    no stents    CARDIOVASCULAR STRESS TEST  12/2014    CATARACT REMOVAL WITH IMPLANT Left 11/07/2017    [facility]    CATARACT REMOVAL WITH IMPLANT Right 11/28/2017    [facility]    COLON SURGERY      colostomy reversal    COLONOSCOPY  04/07/2016    tubular adenoma x3; internal hemorrhoids    COLONOSCOPY N/A 11/06/2019    COLONOSCOPY DIAGNOSTIC performed by Alicia Mathis MD at [address] [place] [address]  11/06/2019    COLOSTOMY  1986    bowel obstruction/ rupture from diverticular disease, reversal 3 mos later    CYSTOSCOPY  09/08/2017    W/ 200IU Botox     FRACTURE SURGERY Right 2011    hip    FRACTURE SURGERY Left 2001    [facility]    FRACTURE SURGERY Left 2013    ankle    HERNIA REPAIR      HYSTERECTOMY  1969    JOINT REPLACEMENT Bilateral 2000    BILAT KNEES    LUMBAR FUSION  2017    L2/L3    WI XCAPSL CTRC RMVL INSJ IO LENS PROSTH W/O ECP Left 11/07/2017    EYE CATARACT EMULSIFICATION IOL IMPLANT performed by Ezra Ritter MD at [facility] 60 RMVL INSJ IO LENS PROSTH W/O ECP Right 11/28/2017    EYE CATARACT EMULSIFICATION IOL IMPLANT performed by Ezra Ritter MD at [facility] Right 10/27/2015    WITH POLY EXCHANGE BIOMET AND GPS APPLICATION    REVISION TOTAL KNEE ARTHROPLASTY Left 2020    KNEE TOTAL ARTHROPLASTY REVISION - POLY EXCHANGE performed by Sangeeta Aguilera MD at [place] [address]      fusion, did not take   [address]    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    NEAL AND BSO      TONSILLECTOMY      UPPER GASTROINTESTINAL ENDOSCOPY      UPPER GASTROINTESTINAL ENDOSCOPY  2014    UPPER GASTROINTESTINAL ENDOSCOPY  2016    mild gastritis    UPPER GASTROINTESTINAL ENDOSCOPY N/A 2018    retained thick secretions in proximal esophagus       Family History   Problem Relation Age of Onset    Breast Cancer Mother     Cancer Mother         breast and uterine  39    Heart Disease Father     Heart Attack Father          28   [address] [facility] Maternal Grandmother     Cancer Brother 46        bronchogenic adenocarcinoma cancer    Lung Cancer Brother     Cancer Sister         lung    Lung Cancer Sister          72    Breast Cancer Sister          62    Cancer Sister         breast       Social History     Tobacco Use    Smoking status: Former Smoker     Packs/day: 0.50     Years: 20.00     Pack years: 10.00     Quit date: 1982     Years since quittin.8    Smokeless tobacco: Former User     Quit date: 1982   Substance Use Topics    Alcohol use: No      Current Outpatient Medications   Medication Sig Dispense Refill    Plecanatide (TRULANCE) 3 MG TABS Take 3 mg by mouth daily 60 tablet 3    lidocaine 4 % external patch Apply to affected area; leave in place no longer than 12 hrs then remove the patch; use no longer than 12 hrs/day total 14 patch 0    lidocaine 4 % external patch Apply to affected area; leave in place no longer than 12 hrs then remove the patch; use no longer than 12 hrs/day total 14 patch 0    irbesartan (AVAPRO) 75 MG tablet Take 1 tablet by mouth daily 90 tablet 1    polyethylene glycol (GLYCOLAX) 17 GM/SCOOP powder Take 17 g by mouth daily 2 tabs every morning      Wheat Dextrin (BENEFIBER DRINK MIX PO) Take by mouth 2 tablespoons every night      ergocalciferol (ERGOCALCIFEROL) 1.25 MG (64211 UT) capsule Take 1 capsule by mouth once a week      benzonatate (TESSALON) 200 MG capsule Take 1 capsule by mouth as needed      albuterol (PROVENTIL) (5 MG/ML) 0.5% nebulizer solution Inhale 0.5 mLs into the lungs as needed      nystatin (MYCOSTATIN) 904304 UNIT/GM powder Apply 3 times daily. 45 g 3    esomeprazole (NEXIUM) 40 MG delayed release capsule Take 1 capsule by mouth every morning (before breakfast) 90 capsule 3    hydrocortisone (ANUSOL-HC) 2.5 % CREA rectal cream Place rectally 2 times daily 28 g 3    vitamin B-12 (CYANOCOBALAMIN) 1000 MCG tablet Take 1 tablet by mouth once a week 30 tablet 3    tiZANidine (ZANAFLEX) 4 MG tablet Take 1 tablet by mouth 4 times daily as needed (Muscle spasms) 40 tablet 0    aspirin 81 MG EC tablet Take 1 tablet by mouth 2 times daily 30 tablet 3    nystatin-triamcinolone (MYCOLOG II) 882541-5.1 UNIT/GM-% cream Apply topically 4 times daily Apply topically 4 times daily.  30 g 3    rOPINIRole (REQUIP) 1 MG tablet Take by mouth nightly       glipiZIDE (GLUCOTROL) 5 MG tablet 2.5mg in the am and 2.5 mg in the pm      Continuous Blood Gluc Sensor (FREESTYLE DAVIS 14 DAY SENSOR) MISC       Salicylic Acid (COMPOUND W EX)       LIVALO 2 MG TABS tablet Take 2 mg by mouth nightly       Multiple Vitamins-Minerals (THERAPEUTIC MULTIVITAMIN-MINERALS) tablet Take 1 tablet by mouth daily      Multiple Vitamins-Minerals (PRESERVISION AREDS PO) Take by mouth daily       Continuous Blood Gluc  (FREESTYLE DAVIS 14 DAY READER) MATTHEW       donepezil (ARICEPT) 10 MG tablet Take 10 mg by mouth nightly       estradiol (ESTRACE VAGINAL) 0.1 MG/GM vaginal cream Place 1 g vaginally Twice a Week (Patient taking differently: Place 1 g vaginally every 14 days ) 1 Tube 5    FARXIGA 10 MG tablet TAKE 1 TABLET BY MOUTH IN THE MORNING  30 tablet 11    CRANBERRY PO Take by mouth 2 times daily       No current facility-administered medications for this visit. Facility-Administered Medications Ordered in Other Visits   Medication Dose Route Frequency Provider Last Rate Last Admin    0.9 % sodium chloride infusion 250 mL  250 mL Intravenous Once Fani Murillo MD         Allergies   Allergen Reactions    Iodides Anaphylaxis, Hives and Itching     \"Contrast dyes\"  This was added by someone but Patient states has had IVP dyes multiple times without problems and had a myelogram in Dec 2016 without problems    Povidone-Iodine Anaphylaxis, Hives and Swelling    Sulfa Antibiotics Hives and Swelling     Other reaction(s): Unknown    Betadine [Povidone Iodine] Hives    Cephalexin Hives and Swelling    Cephalexin Itching     Other reaction(s): Hallucinations  In 1969 received demerol and keflex together so was told to list both as allergies    Cozaar [Losartan] Nausea Only     Pt also gets sores on toungue    Cymbalta [Duloxetine Hcl] Diarrhea and Nausea And Vomiting     Weakness    Demerol Hives and Swelling    Doxycycline Other (See Comments)     Sore mouth        Meperidine Itching     Other reaction(s): Hallucinations      Morphine Hives and Itching    Penicillins Hives, Swelling and Itching     Other reaction(s): Intolerance-unknown  Other reaction(s): Unknown    Zithromax [Azithromycin] Other (See Comments)     Sore mouth      Clindamycin/Lincomycin     Etodolac     Fluorescein     Iodine      Other reaction(s):  Intolerance-unknown    Lisinopril      cough    Penicillin G     Soap     Toviaz [Fesoterodine Fumarate Er]     Metformin And Related Nausea And Vomiting     Diarrhea and N/V       Health Maintenance   Topic Date Due    Hepatitis C screen  Never done   [employer] Visit (AWV)  Never done    DTaP/Tdap/Td vaccine (1 - Tdap) 01/09/2022 (Originally 8/12/1959)    Lipid screen  07/06/2021    Potassium monitoring  12/10/2021    Creatinine monitoring  12/10/2021    Flu vaccine  Completed    Shingles Vaccine  Completed    Pneumococcal 65+ years Vaccine  Completed    COVID-19 Vaccine  Completed    DEXA (modify frequency per FRAX score)  Addressed    Hepatitis A vaccine  Aged Out    Hib vaccine  Aged Out    Meningococcal (ACWY) vaccine  Aged Out       Subjective:      Review of Systems   Respiratory: Negative. Cardiovascular: Negative. Objective:     /82   Pulse 87   Temp 98 °F (36.7 °C)   Ht 4' 11\" (1.499 m)   Wt 159 lb 6.4 oz (72.3 kg)   SpO2 96%   BMI 32.19 kg/m²   Physical Exam  Vitals signs and nursing note reviewed. Constitutional:       General: She is not in acute distress. Appearance: She is well-developed. She is not ill-appearing. HENT:      Head: Normocephalic and atraumatic. Right Ear: External ear normal.      Left Ear: External ear normal.   Eyes:      General: No scleral icterus. Right eye: No discharge. Left eye: No discharge. Conjunctiva/sclera: Conjunctivae normal.   Neck:      Thyroid: No thyromegaly. Trachea: No tracheal deviation. Cardiovascular:      Rate and Rhythm: Normal rate and regular rhythm. Heart sounds: Normal heart sounds. Pulmonary:      Effort: Pulmonary effort is normal. No respiratory distress. Breath sounds: Normal breath sounds. No wheezing. Musculoskeletal:      Right lower leg: No edema. Left lower leg: No edema. Lymphadenopathy:      Cervical: No cervical adenopathy. Skin:     General: Skin is warm. Findings: No rash. Neurological:      Mental Status: She is alert and oriented to person, place, and time. Psychiatric:         Mood and Affect: Mood normal.         Behavior: Behavior normal.         Thought Content: Thought content normal.         Assessment:       Diagnosis Orders   1.  Essential hypertension Plan:      Return in about 3 months (around 6/18/2021) for hypertension. Continue current BP meds    No orders of the defined types were placed in this encounter. No orders of the defined types were placed in this encounter. Patient given educationalmaterials - see patient instructions. Discussed use, benefit, and side effectsof prescribed medications. All patient questions answered. Pt voiced understanding. Reviewed health maintenance. Instructed to continue current medications, diet andexercise. Patient agreed with treatment plan. Follow up as directed.      Electronicallysigned by Jenaro Carpenter MD on 3/18/2021 at 11:08 AM

## 2021-03-22 ENCOUNTER — HOSPITAL ENCOUNTER (OUTPATIENT)
Dept: PHYSICAL THERAPY | Age: 81
Setting detail: THERAPIES SERIES
Discharge: HOME | End: 2021-03-22
Payer: MEDICARE

## 2021-03-22 ENCOUNTER — TELEPHONE (OUTPATIENT)
Dept: GASTROENTEROLOGY | Age: 81
End: 2021-03-22

## 2021-03-22 NOTE — TELEPHONE ENCOUNTER
Writer called patient and told her Reed's recommendations. Patient verbalized understanding and said to tell True Cuong you very much\". Writer stated that she would tell Julian Pitts and to call the office back if she has any more problems or concerns.

## 2021-03-22 NOTE — TELEPHONE ENCOUNTER
Patient walked in today for a BP check. See note from 11/9.    BP at home this morning on home machine was 165/100. He came in because he was concerned it was too high.    BP in office 97/67, HR 80. Patient denies any dizziness or lightheadedness today. He says he was dizzy yesterday for a few moments at work, but stood still for a few minutes and felt better. He asks BP reading be sent to PCP and cardiology. Patient will continue to check BP at home and update the office. He was advised to call if he gets a systolic reading lower than today's, accompanied by any dizziness.    Pt spoke with Andrew Laws on sat about her bowel blockage and after doing what she said pt now has a very liquid diarrhea and not able to even get from one room to another. Pt needs to know what she can do as she can not eat,everything goes straight thru   And is also afraid to take emodium in fear it will cause another blockage.

## 2021-03-24 ENCOUNTER — HOSPITAL ENCOUNTER (OUTPATIENT)
Dept: PHYSICAL THERAPY | Age: 81
Setting detail: THERAPIES SERIES
Discharge: HOME | End: 2021-03-24
Payer: MEDICARE

## 2021-03-24 PROCEDURE — 97140 MANUAL THERAPY 1/> REGIONS: CPT

## 2021-03-24 PROCEDURE — 97110 THERAPEUTIC EXERCISES: CPT

## 2021-03-24 NOTE — PROGRESS NOTES
509 Counts include 234 beds at the [facility]   [address]   Phone: [phone]   Fax: [phone]    Physical Therapy daily Treatment Note    Date:  3/24/2021  Patient Name:  John Pat    :  1940  MRN: [id number]  Physician: Hiram Marcano MD     Insurance: Medicare with [employer] secondary. Visits BMN and in accordance with Medicare guidelines  Diagnosis: T19.756 (ICD-10-CM) - History of revision of total knee arthroplasty (LEFT)   Onset Date: 20   Next Dr. Taniya Yu: As needed  Visit# / total visits: 7/10  Cancels/No Shows: 2/0  Next progress note due by visit #10 or 21    Subjective:       Pain:  [] Yes  [x] No Location: L knee Pain Rating: (0-10 scale) 0/10  Pain altered Tx:  [] No  [] Yes  Action:  Comments: 3/24: Patient reports today with no new complaints. Reported that knee continues to be stiff and sore. Patient also reports that she continues to perform \"massage\" to lower leg for desensitization. Objective:      Range of Motion  Left Range of Motion  Right Strength  Left Strength  Right   Hip Flexion   3+/5 3+/5   Hip Abduction   4-/5 4-/5   Hip Adduction   4/5 4/5   Hip Extension   3+/5 4/5   Hip ER       Hip IR       Knee Flexion 115 / 118 120 4-/5 4/5   Knee Extension   4-/5 4/5   Dorsiflexion       Plantar flexion       Inversion       Eversion         FTSTS: 22 seconds from chair without UE support    Objective testing above collected on 3/15.     Modalities:   Precautions: None  Exercises:  Exercise Reps/ Time Weight/ Level Comments Performed=x   Heel slides with red swiss ball 20 reps   x   SLRs 2x10   x   Bridges 10 reps x 2   x   NUSTEP 6' L4     Gastroc slant board stretch 30'' x 3  Bilateral x   Supine HS stretch 5x 30\"   x          Squats  15x   x   3-way hip 20x  Orange Each leg x   Step Ups  2x10 6\" Forward/Lateral x   Heel Taps Forward/Lateral 10x 4\" Min to Mod UE A            Other: Manual therapy x 10'   -MFR to L VL- 3/3/ hypervolt- held today -hip flexor stretch and quad stretch side-lying  -Posterior tibiofemoral glides on L (Grade III/IV)   -Manual knee flexion      Specific Instructions for next treatment: STM as needed to L quads/hip flexors, L knee flexion ROM and lower quarter strengthening/stabilization as tolerated. 3/3: floor recovery to enhance pt's ability to perform HEP on the floor and be able to get up safely. Assessment: [x] Progressing toward goals. [] No change. [] Other:    [x] Patient would continue to benefit from skilled physical therapy services in order to: decrease pain, improve ROM and restore full strength/mobility at [facility]      3/24: Continued exercises to improve L LE strength and ROM. Increased reps of 3-way hip to increase strengthening potential of exercise. Intermittent cueing provided to improve posture with standing exercises and to remain on task with number of reps performed of each exercise. Patient reported decreased stiffness post exercises. STG: (to be met in 5 treatments)  1. Pt will self report worst pain no greater than 2/10 in order to better tolerate ADLs/work activities with minimal dysfunction GOAL MET 3/15  2. Pt will improve AROM in L knee to 120 deg flexion in order to demonstrate ability to move/reach in all planes unrestricted at  [address] POC 3/10  3. Pt will improve FTSTS to <15 seconds with minimal use of UEs in order to demonstrate improved safety with all functional transitions GOAL PROGRESSING AND EXTENDED THROUGH REMAININC POC 3/10  LTG: (to be met in 10 treatments)  1. Pt will demonstrate improved L LE strength to 4/5 or greater in order to demonstrate improved stability/strength necessary for unrestricted ADLs/work activities GOAL PROGRESSING 3/10  2. Pt will improve KOOS to 10% impaired or less in order to demonstrate improved functional tolerances at PLOF with minimal restriction/dysfunction  3.  Pt will demonstrate independence with a long term HEP for continued progress/maintenance after completion of PT GOAL PROGRESSING 3/10    Pt. Education:  [x] Yes  [] No  [x] Reviewed Prior HEP/Ed  Method of Education: [x] Verbal  [] Demo  [] Written  Comprehension of Education:  [x] Verbalizes understanding. [x] Demonstrates understanding. [] Needs review. [] Demonstrates/verbalizes HEP/Ed previously given.      Plan: [x] Continue per plan of care    [] Other:        Treatment Charges: Mins Units   []  Modalities     [x]  Ther Exercise 36 2   [x]  Manual Therapy 8 1   []  Ther Activities     []  Aquatics     []  Neuromuscular     [] Vasocompression     [] Gait Training     [] Dry needling        [] 1 or 2 muscles        [] 3 or more muscles     []  Other     Total Treatment time 44 3     Time In: 11:20 am            Time Out: 12:04    Electronically signed by:  Jose Roberto Bowman PTA

## 2021-03-29 ENCOUNTER — HOSPITAL ENCOUNTER (OUTPATIENT)
Age: 81
Setting detail: SPECIMEN
Discharge: HOME | End: 2021-03-29
Payer: MEDICARE

## 2021-03-29 ENCOUNTER — HOSPITAL ENCOUNTER (OUTPATIENT)
Dept: PHYSICAL THERAPY | Age: 81
Setting detail: THERAPIES SERIES
Discharge: HOME | End: 2021-03-29
Payer: MEDICARE

## 2021-03-29 DIAGNOSIS — N39.0 URINARY TRACT INFECTION WITHOUT HEMATURIA, SITE UNSPECIFIED: ICD-10-CM

## 2021-03-29 PROCEDURE — 97140 MANUAL THERAPY 1/> REGIONS: CPT

## 2021-03-29 PROCEDURE — 97530 THERAPEUTIC ACTIVITIES: CPT

## 2021-03-29 PROCEDURE — 97110 THERAPEUTIC EXERCISES: CPT

## 2021-03-29 NOTE — FLOWSHEET NOTE
[facility]   [address]   Phone: [phone]   Fax: [phone]    Physical Therapy Daily Treatment Note    Date:  3/29/2021  Patient Name:  Berto Spence    :  1940  MRN: [id number]  Physician: Calvin Manriquez MD     Insurance: Medicare with [employer] secondary. Visits BMN and in accordance with Medicare guidelines  Diagnosis: G56.894 (ICD-10-CM) - History of revision of total knee arthroplasty (LEFT)   Onset Date: 20   Next Dr. Maegan Vidales: As needed  Visit# / total visits: 8/10  Cancels/No Shows: 2/0  Next progress note due by visit #10 or 21    Subjective:       Pain:  [] Yes  [x] No Location: L knee Pain Rating: (0-10 scale) 0/10  Pain altered Tx:  [] No  [] Yes  Action:  Comments: 3/29: Pt states that she's doing pretty well and that her knee seems to be moving better with minimal pain. Would still like to know how to get on and off the floor safely. Objective:      Range of Motion  Left Range of Motion  Right Strength  Left Strength  Right   Hip Flexion   3+/5 3+/5   Hip Abduction   4-/5 4-/5   Hip Adduction   4/5 4/5   Hip Extension   3+/5 4/5   Hip ER       Hip IR       Knee Flexion 115 / 118 120 4-/5 4/5   Knee Extension   4-/5 4/5   Dorsiflexion       Plantar flexion       Inversion       Eversion         FTSTS: 22 seconds from chair without UE support    Objective testing above collected on 3/15.     Modalities:   Precautions: None  Exercises:  Exercise Reps/ Time Weight/ Level Comments Performed=x   Heel slides with red swiss ball 20 reps      SLRs 2x10      Bridges 10 reps x 2      NUSTEP 6' L4  x   Gastroc slant board stretch 30'' x 3  Bilateral x   Supine HS stretch 5x 30\"             Squats  15x   x   3-way hip 20x  Orange Each leg    Step Ups  2x10 6\" Forward/Lateral    Heel Taps Forward/Lateral 10x 4\" Min to Mod UE A     Sit to stands from step 5 reps at 12'' step, 1 rep at 10'' step  CGA and UE support at chair  1 set at 12'' (10'' step + airex)  1 rep at 10''   x   Lunges 20 reps  L/R alternating; with UE support in bars x   Transitions into quadruped on mat table  5' To assess L knee tolerance to pressure x          Other: Manual therapy x 8'   -Posterior tibiofemoral glides on L (Grade III/IV)   -Manual knee flexion      Specific Instructions for next treatment: STM as needed to L quads/hip flexors, L knee flexion ROM and lower quarter strengthening/stabilization as tolerated. 3/3: floor recovery to enhance pt's ability to perform HEP on the floor and be able to get up safely. Assessment: [x] Progressing toward goals. [] No change. [] Other:    [x] Patient would continue to benefit from skilled physical therapy services in order to: decrease pain, improve ROM and restore full strength/mobility at [facility]      3/29: Trialed quadruped kneeling on mat table which pt had significant difficulty with due to pressure on L knee and not appropriate to trial full floor transfers at this time. Advised that it may be safer and more comfortable to utilize her step stool at home to sit on in order access low cabinets or to do cleaning tasks on floor. Initiated small range lunges to build strength for eventual floor transfers, and also practiced low sit to stands from an elevated step today. Good understanding with all education today and states that she has recently purchased a padded kneeler with arm supports in order to safely kneel in the future. Will require additional functional strengthening as well as balance training in order to improve safety in home. STG: (to be met in 5 treatments)  1. Pt will self report worst pain no greater than 2/10 in order to better tolerate ADLs/work activities with minimal dysfunction GOAL MET 3/15  2. Pt will improve AROM in L knee to 120 deg flexion in order to demonstrate ability to move/reach in all planes unrestricted at  [address] [facility] 3/10  3.  Pt will improve FTSTS to <15 seconds with minimal use of UEs in order to demonstrate improved safety with all functional transitions GOAL PROGRESSING AND EXTENDED THROUGH REMAININC POC 3/10  LTG: (to be met in 10 treatments)  1. Pt will demonstrate improved L LE strength to 4/5 or greater in order to demonstrate improved stability/strength necessary for unrestricted ADLs/work activities GOAL PROGRESSING 3/10  2. Pt will improve KOOS to 10% impaired or less in order to demonstrate improved functional tolerances at PLOF with minimal restriction/dysfunction  3. Pt will demonstrate independence with a long term HEP for continued progress/maintenance after completion of PT GOAL PROGRESSING 3/10    Pt. Education:  [x] Yes  [] No  [x] Reviewed Prior HEP/Ed  Method of Education: [x] Verbal  [] Demo  [] Written  Comprehension of Education:  [x] Verbalizes understanding. [x] Demonstrates understanding. [] Needs review. [] Demonstrates/verbalizes HEP/Ed previously given.      Plan: [x] Continue per plan of care    [] Other:        Treatment Charges: Mins Units   []  Modalities     [x]  Ther Exercise 15 1   [x]  Manual Therapy 8 1   [x]  Ther Activities 15 1   []  Aquatics     []  Neuromuscular     [] Vasocompression     [] Gait Training     [] Dry needling        [] 1 or 2 muscles        [] 3 or more muscles     []  Other     Total Treatment time 38 3     Time In: 10:50 am            Time Out: 11:28am    Electronically signed by:  Joshua Garcia PT

## 2021-03-31 ENCOUNTER — HOSPITAL ENCOUNTER (OUTPATIENT)
Dept: PHYSICAL THERAPY | Age: 81
Setting detail: THERAPIES SERIES
Discharge: HOME | End: 2021-03-31
Payer: MEDICARE

## 2021-03-31 LAB
CULTURE: ABNORMAL
Lab: ABNORMAL
SPECIMEN DESCRIPTION: ABNORMAL

## 2021-03-31 PROCEDURE — 97140 MANUAL THERAPY 1/> REGIONS: CPT

## 2021-03-31 PROCEDURE — 97112 NEUROMUSCULAR REEDUCATION: CPT

## 2021-03-31 PROCEDURE — 97110 THERAPEUTIC EXERCISES: CPT

## 2021-03-31 NOTE — FLOWSHEET NOTE
[facility]   [facility]   Phone: [phone]   Fax: [phone]    Physical Therapy Daily Treatment Note    Date:  3/31/2021  Patient Name:  Elier Corrigan    :  1940  MRN: [id number]  Physician: Rowdy Mcneil MD     Insurance: Medicare with [employer] secondary. Visits BMN and in accordance with Medicare guidelines  Diagnosis: E08.717 (ICD-10-CM) - History of revision of total knee arthroplasty (LEFT)   Onset Date: 20   Next Dr. Delayne Castleman: As needed  Visit# / total visits: 9/10  Cancels/No Shows: 2/0  Next progress note due by visit #10 or 21    Subjective:       Pain:  [] Yes  [x] No Location: L knee Pain Rating: (0-10 scale) 0/10  Pain altered Tx:  [] No  [] Yes  Action:  Comments: 3/31: Pt states that she was quite sore in her L leg after her previous visit and later that evening felt a really tight spasm/cramp in her thigh. Otherwise, no pain currently just moderate residual tightness and soreness. Objective:      Range of Motion  Left Range of Motion  Right Strength  Left Strength  Right   Hip Flexion   3+/5 3+/5   Hip Abduction   4-/5 4-/5   Hip Adduction   4/5 4/5   Hip Extension   3+/5 4/5   Hip ER       Hip IR       Knee Flexion 115 / 118 120 4-/5 4/5   Knee Extension   4-/5 4/5   Dorsiflexion       Plantar flexion       Inversion       Eversion         FTSTS: 22 seconds from chair without UE support    Objective testing above collected on 3/15.     Modalities:   Precautions: None  Exercises:  Exercise Reps/ Time Weight/ Level Comments Performed=x   Heel slides with red swiss ball 20 reps   x   SLRs 2x10      Bridges 10 reps x 2      NUSTEP 6' L4  x   Gastroc slant board stretch 30'' x 3  Bilateral x   Supine HS stretch 5x 30\"             Squats  15x   x   3-way hip 20x  Orange Each leg    Step Ups  20 reps x 2 6\" 1 set forwards, 1 set lateral  Min to mod UE support in bars x   Heel Taps Forward/Lateral 10x 4\" Min to Mod UE A     Sit to stands from step 5 reps at 12'' step, 1 rep at 10'' step  CGA and UE support at chair  1 set at 12'' (10'' step + airex)  1 rep at 10''      Lunges 20 reps  L/R alternating; with UE support in bars x   Transitions into quadruped on mat table  5' To assess L knee tolerance to pressure    Near tandem stance 30'' x 2  R/L each x   Bilateral stance on airex with slow horizontal head turns ~5'   x          Other: Manual therapy x 8'   -Manual knee flexion  -MFR to L quads      Specific Instructions for next treatment: STM as needed to L quads/hip flexors, L knee flexion ROM and lower quarter strengthening/stabilization as tolerated. 3/3: floor recovery to enhance pt's ability to perform HEP on the floor and be able to get up safely. Assessment: [x] Progressing toward goals. [] No change. [] Other:    [x] Patient would continue to benefit from skilled physical therapy services in order to: decrease pain, improve ROM and restore full strength/mobility at [facility]      3/31: Moderate tenderness and soft tissues restrictions in L thigh today, likely residual soreness from increased loading at last visit but otherwise no complaints. Session emphasis on CKC strengthening and also initiated static and dynamic balance interventions to improve stability and minimize fall risk. Good tolerance through all, just mild fatigue and difficulty. Progress note due at next visit. STG: (to be met in 5 treatments)  1. Pt will self report worst pain no greater than 2/10 in order to better tolerate ADLs/work activities with minimal dysfunction GOAL MET 3/15  2. Pt will improve AROM in L knee to 120 deg flexion in order to demonstrate ability to move/reach in all planes unrestricted at  [address] 3/10  3.  Pt will improve FTSTS to <15 seconds with minimal use of UEs in order to demonstrate improved safety with all functional transitions GOAL PROGRESSING AND EXTENDED THROUGH [facility]

## 2021-04-05 ENCOUNTER — HOSPITAL ENCOUNTER (OUTPATIENT)
Dept: PHYSICAL THERAPY | Age: 81
Setting detail: THERAPIES SERIES
Discharge: HOME | End: 2021-04-05
Payer: MEDICARE

## 2021-04-05 NOTE — FLOWSHEET NOTE
[x] [facility]  [address]  [place]: [phone]   F: [phone]     Physical Therapy Cancel/No Show note    Date: 2021  Patient: Claudia Enamorado  : 1940  MRN: [id number]    Visit Count: 9/10  Cancels/No Shows to date: 3/0    For today's appointment patient:    [x]  Cancelled    [] Rescheduled appointment    [] No-show     Reason given by patient:    [x]  Patient ill    []  Conflicting appointment    [] No transportation      [] Conflict with work    [] No reason given    [] Weather related    [] [id number]    [x] Other:      Comments:  Will call back to schedule next visit       [] Next appointment was confirmed    Electronically signed by: Yuan Torres, PT      \

## 2021-04-06 ENCOUNTER — TELEPHONE (OUTPATIENT)
Dept: GASTROENTEROLOGY | Age: 81
End: 2021-04-06

## 2021-04-06 NOTE — TELEPHONE ENCOUNTER
Patient left a message stating that she has had diarrhea with anything she eats. Would like a call back to talk about.

## 2021-04-06 NOTE — TELEPHONE ENCOUNTER
Patient also called the writer line and left a message wanting Andre Villavicencio to call her. Writer will route message to Andre Villavicencio.

## 2021-04-10 ASSESSMENT — KOOS JR
STRAIGHTENING KNEE FULLY: 1
HOW SEVERE IS YOUR KNEE STIFFNESS AFTER FIRST WAKING IN MORNING: 0
GOING UP OR DOWN STAIRS: 1
STANDING UPRIGHT: 0
RISING FROM SITTING: 1
BENDING TO THE FLOOR TO PICK UP OBJECT: 0
TWISING OR PIVOTING ON KNEE: 0

## 2021-04-10 ASSESSMENT — PROMIS GLOBAL HEALTH SCALE
IN GENERAL, HOW WOULD YOU RATE YOUR PHYSICAL HEALTH [ON A SCALE OF 1 (POOR) TO 5 (EXCELLENT)]?: 3
IN THE PAST 7 DAYS, HOW OFTEN HAVE YOU BEEN BOTHERED BY EMOTIONAL PROBLEMS, SUCH AS FEELING ANXIOUS, DEPRESSED, OR IRRITABLE [ON A SCALE FROM 1 (NEVER) TO 5 (ALWAYS)]?: 3
IN GENERAL, HOW WOULD YOU RATE YOUR MENTAL HEALTH, INCLUDING YOUR MOOD AND YOUR ABILITY TO THINK [ON A SCALE OF 1 (POOR) TO 5 (EXCELLENT)]?: 4
IN THE PAST 7 DAYS, HOW WOULD YOU RATE YOUR FATIGUE ON AVERAGE [ON A SCALE FROM 1 (NONE) TO 5 (VERY SEVERE)]?: 3
HOW IS THE PROMIS V1.1 BEING ADMINISTERED?: 2
IN GENERAL, WOULD YOU SAY YOUR QUALITY OF LIFE IS...[ON A SCALE OF 1 (POOR) TO 5 (EXCELLENT)]: 3
IN GENERAL, PLEASE RATE HOW WELL YOU CARRY OUT YOUR USUAL SOCIAL ACTIVITIES (INCLUDES ACTIVITIES AT HOME, AT WORK, AND IN YOUR COMMUNITY, AND RESPONSIBILITIES AS A PARENT, CHILD, SPOUSE, EMPLOYEE, FRIEND, ETC) [ON A SCALE OF 1 (POOR) TO 5 (EXCELLENT)]?: 2
SUM OF RESPONSES TO QUESTIONS 2, 4, 5, & 10: 13
WHO IS THE PERSON COMPLETING THE PROMIS V1.1 SURVEY?: 0
IN GENERAL, HOW WOULD YOU RATE YOUR SATISFACTION WITH YOUR SOCIAL ACTIVITIES AND RELATIONSHIPS [ON A SCALE OF 1 (POOR) TO 5 (EXCELLENT)]?: 3
IN GENERAL, WOULD YOU SAY YOUR HEALTH IS...[ON A SCALE OF 1 (POOR) TO 5 (EXCELLENT)]: 2
SUM OF RESPONSES TO QUESTIONS 3, 6, 7, & 8: 10
TO WHAT EXTENT ARE YOU ABLE TO CARRY OUT YOUR EVERYDAY PHYSICAL ACTIVITIES SUCH AS WALKING, CLIMBING STAIRS, CARRYING GROCERIES, OR MOVING A CHAIR [ON A SCALE OF 1 (NOT AT ALL) TO 5 (COMPLETELY)]?: 4
IN THE PAST 7 DAYS, HOW WOULD YOU RATE YOUR PAIN ON AVERAGE [ON A SCALE FROM 0 (NO PAIN) TO 10 (WORST IMAGINABLE PAIN)]?: 0

## 2021-04-13 ENCOUNTER — HOSPITAL ENCOUNTER (OUTPATIENT)
Dept: PHYSICAL THERAPY | Age: 81
Setting detail: THERAPIES SERIES
Discharge: HOME | End: 2021-04-13
Payer: MEDICARE

## 2021-04-13 PROCEDURE — 97112 NEUROMUSCULAR REEDUCATION: CPT

## 2021-04-13 PROCEDURE — 97110 THERAPEUTIC EXERCISES: CPT

## 2021-04-13 NOTE — PROGRESS NOTES
[facility]   [address]   Phone: [phone]   Fax: [phone]    Physical Therapy Progress Note and Discharge Summary    Date:  2021  Patient Name:  Jossy Oliveira    :  1940  MRN: [id number]  Physician: Hernan Hayden MD     Insurance: Medicare with [employer] secondary. Visits BMN and in accordance with Medicare guidelines  Diagnosis: L72.639 (ICD-10-CM) - History of revision of total knee arthroplasty (LEFT)   Onset Date: 20   Next Dr. Bryant : As needed  Visit# / total visits: 10/10  Cancels/No Shows: 3/0  Next progress note due by visit #10 or 21    Subjective:       Pain:  [] Yes  [x] No Location: L knee Pain Rating: (0-10 scale) 0/10  Pain altered Tx:  [] No  [] Yes  Action:  Comments: : Pt states that her knee has been doing so much better with no recent pain. Still reports intermittent cramping in her muscles but has been doing all her exercises which have been helping significantly. Also recently purchased a kneeler which she has been able to use to get down on the ground to clean and clear out her cupboards. Feels ready to finish up with PT at this time but would like a couple more ideas to work on her balance as she occasionally still feels unsteady as a result of her LE neuropathy. Formal progress note performed today for reporting period 3/15/21-21.     KOOS score (): 0% impaired    Objective:      Range of Motion  Left Range of Motion  Right Strength  Left Strength  Right   Hip Flexion   4-/5 4-/5   Hip Abduction   4-/5 4-/5   Hip Adduction   4/5 4/5   Hip Extension   4-/5 4-/5   Hip ER       Hip IR       Knee Flexion 120 120 4/5 4/5   Knee Extension   4/5 4/5   Dorsiflexion       Plantar flexion       Inversion       Eversion         FTSTS: 16 seconds without UE support    Objective testing above collected on     Modalities:   Precautions: None  Exercises:  Exercise Reps/ Time Weight/ Level Comments Performed=x   Heel slides with red swiss ball 20 reps      SLRs 2x10      Bridges 10 reps x 2      NUSTEP 6' L4     Gastroc slant board stretch 30'' x 3  Bilateral    Supine HS stretch 5x 30\"             Squats  15x      3-way hip 20x  Orange Each leg    Step Ups  20 reps x 2 6\" 1 set forwards, 1 set lateral  Min to mod UE support in bars    Heel Taps Forward/Lateral 10x 4\" Min to Mod UE A     Sit to stands from step 5 reps at 12'' step, 1 rep at 10'' step  CGA and UE support at chair  1 set at 12'' (10'' step + airex)  1 rep at 10''      Lunges 20 reps  L/R alternating; with UE support in bars    Transitions into quadruped on mat table  5' To assess L knee tolerance to pressure    Near tandem stance 30'' x 2  R/L each    Modified tandem with horizontal head turns ~5'  HEP addition  x   Standing hip hinges (neutral NAKIA) 15 reps  With cervical flexion and extension; to simulate dynamic balance with face washing at sink x   Other:  Re-evaluation of all subjective and objective measures, plus final HEP review- billed as therex     Assessment: [x] Progressing toward goals. [] No change. [] Other:    [] Patient would continue to benefit from skilled physical therapy services in order to: decrease pain, improve ROM and restore full strength/mobility at PLOF      Pt has been seen for 10 PT visits to date, now 9 months s/p L TKA. Overall demonstrates excellent progress to date with regard to knee pain, AROM and overall functional tolerances with no recent complaints in regard to her L knee. Pt denies any recent pain and demonstrates full pain free AROM, symmetrical to contralateral side. Continues to demonstrate mild LE weakness bilaterally, but overall with good symmetry and continues to demonstrate consistent compliance with her HEP. Pt has even been able to kneel recently using an adaptive kneeler in order to be able to clean her floors better.  Only major deficit at this time is mild unsteadiness on feet, primarily due to neuropathy in B feet. Final HEP provided today with emphasis on strength as well as dynamic balance activities, including additions today, in order to improve safety in home. Pt demonstrates excellent understanding and expresses readiness to discharge from PT as time. Anticipate good long term prognosis pending continued HEP compliance for long term maintenance. STG: (to be met in 5 treatments)  1. Pt will self report worst pain no greater than 2/10 in order to better tolerate ADLs/work activities with minimal dysfunction GOAL MET 3/15  2. Pt will improve AROM in L knee to 120 deg flexion in order to demonstrate ability to move/reach in all planes unrestricted at PLOF GOAL MET 4/13  3. Pt will improve FTSTS to <15 seconds with minimal use of UEs in order to demonstrate improved safety with all functional transitions GOAL 90% MET AND PROGRESSING W/INDEPENDENT HEP 4/13  LTG: (to be met in 10 treatments)  1. Pt will demonstrate improved L LE strength to 4/5 or greater in order to demonstrate improved stability/strength necessary for unrestricted ADLs/work activities GOAL PROGRESSING W/INDEPENDENT HEP 4/13  2. Pt will improve KOOS to 10% impaired or less in order to demonstrate improved functional tolerances at PLOF with minimal restriction/dysfunction GOAL MET 4/13  3. Pt will demonstrate independence with a long term HEP for continued progress/maintenance after completion of PT GOAL MET 4/13    Pt. Education:  [x] Yes  [] No  [x] Reviewed Prior HEP/Ed  Method of Education: [x] Verbal  [x] Demo  [x] Written  Comprehension of Education:  [x] Verbalizes understanding. [x] Demonstrates understanding. [] Needs review. [] Demonstrates/verbalizes HEP/Ed previously given.      Plan: [] Continue per plan of care    [x] Other: Discharge to independent HEP        Treatment Charges: Mins Units   []  Modalities     [x]  Ther Exercise 32 1   []  Manual Therapy     []  Ther Activities     []  Aquatics     [x] Neuromuscular 8 1   [] Vasocompression     [] Gait Training     [] Dry needling        [] 1 or 2 muscles        [] 3 or more muscles     []  Other     Total Treatment time 40 3     Time In: 2:00pm            Time Out: 2:40pm    Electronically signed by:  Yuan Torres PT            [x] [facility] ([facility] @ [facility]  [address] [place] [address] Emeli Cedeno, Kiana Dowell.  Phone [phone]  Fax [phone]    Physical Therapy Discharge Note    Date: 2021      Patient: Claudia Page  : 1940  MRN: [id number]              [x] Patient recovered from conditions. Treatment goals were met. [] Patient received maximum benefit. No further therapy indicated at this time. [] Patient demonstrated improvement from condition with  ** Of  ** Short term goals met. []Patient demonstrated improvement from condition with **   Of **  Long term goals met. [] Patient to continue exercise/home instructions independently. [] Therapy interrupted due to:    [] Patient has 2 or more no shows/cancels, is discontinued per our policy. [] Patient has completed prescribed number of treatment sessions. [] Other:      Pain level at evaluation was       1/10 and at discharge was       0/10    It Is My Understanding That The:  [] Patient returned to work. [] Patient demonstrated improved level of function. [x] Patient returned to previous functional level. [] Patient's current functional status is unknown due to no-shows  [] Other:                  If you have any questions or concerns regarding this patient's care, please contact us.    Thank you for your referral.      Electronically signed by: Yuan Torres PT

## 2021-04-14 ENCOUNTER — OFFICE VISIT (OUTPATIENT)
Dept: GASTROENTEROLOGY | Age: 81
End: 2021-04-14
Payer: MEDICARE

## 2021-04-14 ENCOUNTER — HOSPITAL ENCOUNTER (OUTPATIENT)
Age: 81
Setting detail: SPECIMEN
Discharge: HOME | End: 2021-04-14
Payer: MEDICARE

## 2021-04-14 VITALS
BODY MASS INDEX: 31.51 KG/M2 | SYSTOLIC BLOOD PRESSURE: 127 MMHG | HEART RATE: 83 BPM | WEIGHT: 156 LBS | DIASTOLIC BLOOD PRESSURE: 72 MMHG

## 2021-04-14 DIAGNOSIS — N39.0 URINARY TRACT INFECTION WITHOUT HEMATURIA, SITE UNSPECIFIED: ICD-10-CM

## 2021-04-14 DIAGNOSIS — K58.1 IRRITABLE BOWEL SYNDROME WITH CONSTIPATION: Primary | ICD-10-CM

## 2021-04-14 PROCEDURE — 1090F PRES/ABSN URINE INCON ASSESS: CPT | Performed by: NURSE PRACTITIONER

## 2021-04-14 PROCEDURE — 4040F PNEUMOC VAC/ADMIN/RCVD: CPT | Performed by: NURSE PRACTITIONER

## 2021-04-14 PROCEDURE — 99213 OFFICE O/P EST LOW 20 MIN: CPT | Performed by: NURSE PRACTITIONER

## 2021-04-14 PROCEDURE — 1123F ACP DISCUSS/DSCN MKR DOCD: CPT | Performed by: NURSE PRACTITIONER

## 2021-04-14 PROCEDURE — G8400 PT W/DXA NO RESULTS DOC: HCPCS | Performed by: NURSE PRACTITIONER

## 2021-04-14 PROCEDURE — 1036F TOBACCO NON-USER: CPT | Performed by: NURSE PRACTITIONER

## 2021-04-14 PROCEDURE — G8427 DOCREV CUR MEDS BY ELIG CLIN: HCPCS | Performed by: NURSE PRACTITIONER

## 2021-04-14 PROCEDURE — G8417 CALC BMI ABV UP PARAM F/U: HCPCS | Performed by: NURSE PRACTITIONER

## 2021-04-14 ASSESSMENT — ENCOUNTER SYMPTOMS
SINUS PRESSURE: 0
SORE THROAT: 0
VOMITING: 0
CONSTIPATION: 1
APNEA: 0
WHEEZING: 0
TROUBLE SWALLOWING: 0
CHOKING: 0
ANAL BLEEDING: 0
BACK PAIN: 1
RECTAL PAIN: 0
VOICE CHANGE: 0
COUGH: 0
BLOOD IN STOOL: 0
SINUS PAIN: 0
DIARRHEA: 0
NAUSEA: 0
ABDOMINAL DISTENTION: 1
SHORTNESS OF BREATH: 0
ABDOMINAL PAIN: 1

## 2021-04-14 NOTE — PROGRESS NOTES
History of decreased platelet count     Hyperlipemia     Hypertension     Internal hemorrhoid     Macular degeneration of left eye 1980's    S/P laser treatment    Nausea and vomiting     Neuropathy     Osteoarthritis     Ringing in ears     Self-catheterizes urinary bladder     6-7 times daily    TIA (transient ischemic attack) 2000    x1    Tubular adenoma     colon polyps    Type II or unspecified type diabetes mellitus without mention of complication, not stated as uncontrolled     Urinary incontinence     frequent UTI s/p infection, surgical dilatation lead to incontinence    Wears dentures     full on top, partial on bottom    Wears glasses        Past Surgical History:   Procedure Laterality Date    APPENDECTOMY  1962    BLADDER SURGERY      bladder stimulator    BLADDER SUSPENSION  2002    multiple    CARDIAC CATHETERIZATION  2008    no stents    CARDIOVASCULAR STRESS TEST  12/2014    CATARACT REMOVAL WITH IMPLANT Left 11/07/2017    [facility]    CATARACT REMOVAL WITH IMPLANT Right 11/28/2017    [facility]    COLON SURGERY      colostomy reversal    COLONOSCOPY  04/07/2016    tubular adenoma x3; internal hemorrhoids    COLONOSCOPY N/A 11/06/2019    COLONOSCOPY DIAGNOSTIC performed by Africa Kumar MD at [address] [place] [address]  11/06/2019    COLOSTOMY  1986    bowel obstruction/ rupture from diverticular disease, reversal 3 mos later    CYSTOSCOPY  09/08/2017    W/ 200IU Botox     FRACTURE SURGERY Right 2011    hip    FRACTURE SURGERY Left 2001    [facility]    FRACTURE SURGERY Left 2013    ankle    HERNIA REPAIR      HYSTERECTOMY  1969    JOINT REPLACEMENT Bilateral 2000    BILAT KNEES    LUMBAR FUSION  2017    L2/L3    AR XCAPSL CTRC RMVL INSJ IO LENS PROSTH W/O ECP Left 11/07/2017    EYE CATARACT EMULSIFICATION IOL IMPLANT performed by Eleazar Amador MD at [facility] RMVL INSJ IO LENS PROSTH W/O ECP Right 11/28/2017    EYE CATARACT EMULSIFICATION IOL IMPLANT performed by Sepideh Glover [facility] at [facility] Right 10/27/2015    WITH POLY EXCHANGE BIOMET AND GPS APPLICATION    REVISION TOTAL KNEE ARTHROPLASTY Left 07/14/2020    KNEE TOTAL ARTHROPLASTY REVISION - POLY EXCHANGE performed by Gloria Medina MD at [address]  2010    fusion, did not take   [address]    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    NEAL AND BSO      TONSILLECTOMY  1978    UPPER GASTROINTESTINAL ENDOSCOPY      UPPER GASTROINTESTINAL ENDOSCOPY  05/05/2014    UPPER GASTROINTESTINAL ENDOSCOPY  04/07/2016    mild gastritis    UPPER GASTROINTESTINAL ENDOSCOPY N/A 07/17/2018    retained thick secretions in proximal esophagus       CURRENT MEDICATIONS:    Current Outpatient Medications:     nitrofurantoin, macrocrystal-monohydrate, (MACROBID) 100 MG capsule, Take 1 capsule by mouth 2 times daily, Disp: 14 capsule, Rfl: 0    phenazopyridine (PYRIDIUM) 200 MG tablet, Take 1 tablet by mouth 3 times daily as needed for Pain, Disp: 21 tablet, Rfl: 1    lidocaine (XYLOCAINE) 2 % jelly, Apply topically as needed. , Disp: 1 Bottle, Rfl: 2    Plecanatide (TRULANCE) 3 MG TABS, Take 3 mg by mouth daily, Disp: 60 tablet, Rfl: 3    lidocaine 4 % external patch, Apply to affected area; leave in place no longer than 12 hrs then remove the patch; use no longer than 12 hrs/day total, Disp: 14 patch, Rfl: 0    lidocaine 4 % external patch, Apply to affected area; leave in place no longer than 12 hrs then remove the patch; use no longer than 12 hrs/day total, Disp: 14 patch, Rfl: 0    irbesartan (AVAPRO) 75 MG tablet, Take 1 tablet by mouth daily, Disp: 90 tablet, Rfl: 1    polyethylene glycol (GLYCOLAX) 17 GM/SCOOP powder, Take 17 g by mouth daily 2 tabs every morning, Disp: , Rfl:     Wheat Dextrin (BENEFIBER DRINK MIX PO), Take by mouth 2 tablespoons every night, Disp: , Rfl:    Place 1 g vaginally Twice a Week (Patient taking differently: Place 1 g vaginally every 14 days ), Disp: 1 Tube, Rfl: 5    FARXIGA 10 MG tablet, TAKE 1 TABLET BY MOUTH IN THE MORNING , Disp: 30 tablet, Rfl: 11    CRANBERRY PO, Take by mouth 2 times daily, Disp: , Rfl:     ALLERGIES:   Allergies   Allergen Reactions    Iodides Anaphylaxis, Hives and Itching     \"Contrast dyes\"  This was added by someone but Patient states has had IVP dyes multiple times without problems and had a myelogram in Dec 2016 without problems    Povidone-Iodine Anaphylaxis, Hives and Swelling    Sulfa Antibiotics Hives and Swelling     Other reaction(s): Unknown    Betadine [Povidone Iodine] Hives    Cephalexin Hives and Swelling    Cephalexin Itching     Other reaction(s): Hallucinations  In  received demerol and keflex together so was told to list both as allergies    Cozaar [Losartan] Nausea Only     Pt also gets sores on toungue    Cymbalta [Duloxetine Hcl] Diarrhea and Nausea And Vomiting     Weakness    Demerol Hives and Swelling    Doxycycline Other (See Comments)     Sore mouth        Meperidine Itching     Other reaction(s): Hallucinations      Morphine Hives and Itching    Penicillins Hives, Swelling and Itching     Other reaction(s): Intolerance-unknown  Other reaction(s): Unknown    Zithromax [Azithromycin] Other (See Comments)     Sore mouth      Clindamycin/Lincomycin     Etodolac     Fluorescein     Iodine      Other reaction(s):  Intolerance-unknown    Lisinopril      cough    Penicillin G     Soap     Toviaz [Fesoterodine Fumarate Er]     Metformin And Related Nausea And Vomiting     Diarrhea and N/V       FAMILY HISTORY:       Problem Relation Age of Onset    Breast Cancer Mother     Cancer Mother         breast and uterine  39    Heart Disease Father     Heart Attack Father          28    Cancer Maternal Grandmother     Cancer Brother 46        bronchogenic adenocarcinoma cancer    Lung Cancer Brother     Cancer Sister         lung    Lung Cancer Sister          72    Breast Cancer Sister          62    Cancer Sister         breast         SOCIAL HISTORY:   Social History     Socioeconomic History    Marital status:      Spouse name: Not on file    Number of children: Not on file    Years of education: Not on file    Highest education level: Not on file   Occupational History    Occupation: retired   Social Needs    Financial resource strain: Not very hard    Food insecurity     Worry: Never true     Inability: Never true    Transportation needs     Medical: No     Non-medical: No   Tobacco Use    Smoking status: Former Smoker     Packs/day: 0.50     Years: 20.00     Pack years: 10.00     Quit date: 1982     Years since quittin.8    Smokeless tobacco: Former User     Quit date: 1982   Substance and Sexual Activity    Alcohol use: No    Drug use: No    Sexual activity: Not on file   Lifestyle    Physical activity     Days per week: Not on file     Minutes per session: Not on file    Stress: Not on file   Relationships    Social connections     Talks on phone: Not on file     Gets together: Not on file     Attends Mandaeism service: Not on file     Active member of club or organization: Not on file     Attends meetings of clubs or organizations: Not on file     Relationship status: Not on file    Intimate partner violence     Fear of current or ex partner: Not on file     Emotionally abused: Not on file     Physically abused: Not on file     Forced sexual activity: Not on file   Other Topics Concern    Not on file   Social History Narrative    Not on file       REVIEW OF SYSTEMS: A 12-point review of systemswas obtained and pertinent positives and negatives were enumerated above in the history of present illness. All other reviewed systems / symptoms were negative. Review of Systems   Constitutional: Positive for fatigue. Negative for appetite change and unexpected weight change. HENT: Negative for postnasal drip, sinus pressure, sinus pain, sore throat, trouble swallowing and voice change. Eyes: Positive for visual disturbance (Glasses). Respiratory: Negative for apnea, cough, choking, shortness of breath and wheezing. Gastrointestinal: Positive for abdominal distention, abdominal pain and constipation. Negative for anal bleeding, blood in stool, diarrhea, nausea, rectal pain and vomiting. Genitourinary: Negative for difficulty urinating. Musculoskeletal: Positive for arthralgias, back pain (lower), gait problem (cane) and myalgias. Neurological: Positive for headaches. Negative for dizziness, weakness, light-headedness and numbness. Hematological: Bruises/bleeds easily. Psychiatric/Behavioral: Negative for sleep disturbance. The patient is nervous/anxious. PHYSICAL EXAMINATION: Vital signs reviewed per the nursing documentation. There were no vitals taken for this visit. There is no height or weight on file to calculate BMI.    Physical Exam      LABORATORY DATA: Reviewed  Lab Results   Component Value Date    WBC 7.6 12/10/2020    HGB 13.7 12/10/2020    HCT 44.2 12/10/2020    MCV 95.3 12/10/2020    PLT See Reflexed IPF Result 12/10/2020     12/10/2020    K 4.0 12/10/2020     (H) 12/10/2020    CO2 17 (L) 12/10/2020    BUN 17 12/10/2020    CREATININE 0.77 12/10/2020    LABPROT 6.8 11/19/2012    LABALBU 4.1 12/10/2020    BILITOT 0.62 12/10/2020    ALKPHOS 73 12/10/2020    AST 19 12/10/2020    ALT 18 12/10/2020    INR 1.0 02/21/2017         Lab Results   Component Value Date    RBC 4.64 12/10/2020    HGB 13.7 12/10/2020    MCV 95.3 12/10/2020    MCH 29.5 12/10/2020    MCHC 31.0 12/10/2020    RDW 14.1 12/10/2020    MPV NOT REPORTED 12/10/2020    BASOPCT 1 06/30/2020    LYMPHSABS 1.22 06/30/2020    MONOSABS 0.46 06/30/2020    NEUTROABS 3.17 06/30/2020    EOSABS 0.20 06/30/2020    BASOSABS 0.05 06/30/2020         DIAGNOSTIC TESTING:     No results found. IMPRESSION: Ms. Lenard Tatum is a 79 y.o. female with    Diagnosis Orders   1. Irritable bowel syndrome with constipation  XR ABDOMEN (2 VIEWS)     Patient has long-standing hx of constipation. Will obtain sitz marks study to evaluate colonic transit, functional outlet obstruction, etc.  To continue benefiber, prune juice, miralax. Follow-up 2 weeks following xray. Thank you for allowing me to participate in the care of Ms. Lenard Tatum. For any further questions please do not hesitate to contact me. I have reviewed and agree with the ROS entered by the MA/KAELN.          LAWRENCE Dooley    [facility]  Office #: [phone]

## 2021-04-16 ENCOUNTER — HOSPITAL ENCOUNTER (OUTPATIENT)
Dept: GENERAL RADIOLOGY | Age: 81
Discharge: HOME | End: 2021-04-18
Payer: MEDICARE

## 2021-04-16 DIAGNOSIS — K58.1 IRRITABLE BOWEL SYNDROME WITH CONSTIPATION: ICD-10-CM

## 2021-04-16 LAB
CULTURE: ABNORMAL
CULTURE: ABNORMAL
Lab: ABNORMAL
SPECIMEN DESCRIPTION: ABNORMAL

## 2021-04-16 PROCEDURE — 74018 RADEX ABDOMEN 1 VIEW: CPT

## 2021-04-21 ENCOUNTER — HOSPITAL ENCOUNTER (OUTPATIENT)
Age: 81
Discharge: HOME | End: 2021-04-23
Payer: MEDICARE

## 2021-04-21 ENCOUNTER — HOSPITAL ENCOUNTER (OUTPATIENT)
Dept: GENERAL RADIOLOGY | Age: 81
Discharge: HOME | End: 2021-04-23
Payer: MEDICARE

## 2021-04-21 DIAGNOSIS — K58.1 IRRITABLE BOWEL SYNDROME WITH CONSTIPATION: ICD-10-CM

## 2021-04-21 PROCEDURE — 74019 RADEX ABDOMEN 2 VIEWS: CPT

## 2021-04-22 ENCOUNTER — TELEPHONE (OUTPATIENT)
Dept: GASTROENTEROLOGY | Age: 81
End: 2021-04-22

## 2021-04-22 NOTE — TELEPHONE ENCOUNTER
Writer called the patient and gave her Reed's recommendations. Patient verbalized understanding and thanked writer.

## 2021-04-23 PROBLEM — R39.89 URETHRAL PAIN: Status: ACTIVE | Noted: 2021-04-23

## 2021-04-23 PROBLEM — N32.89 BLADDER SPASMS: Status: ACTIVE | Noted: 2021-04-23

## 2021-04-30 ENCOUNTER — OFFICE VISIT (OUTPATIENT)
Dept: GASTROENTEROLOGY | Age: 81
End: 2021-04-30

## 2021-04-30 VITALS
TEMPERATURE: 97.2 F | DIASTOLIC BLOOD PRESSURE: 73 MMHG | HEART RATE: 68 BPM | WEIGHT: 153 LBS | SYSTOLIC BLOOD PRESSURE: 130 MMHG | BODY MASS INDEX: 30.9 KG/M2

## 2021-04-30 DIAGNOSIS — K59.00 CONSTIPATION, UNSPECIFIED CONSTIPATION TYPE: ICD-10-CM

## 2021-04-30 DIAGNOSIS — K64.9 HEMORRHOIDS, UNSPECIFIED HEMORRHOID TYPE: Primary | ICD-10-CM

## 2021-04-30 PROCEDURE — 1090F PRES/ABSN URINE INCON ASSESS: CPT | Performed by: NURSE PRACTITIONER

## 2021-04-30 PROCEDURE — 4040F PNEUMOC VAC/ADMIN/RCVD: CPT | Performed by: NURSE PRACTITIONER

## 2021-04-30 PROCEDURE — 1036F TOBACCO NON-USER: CPT | Performed by: NURSE PRACTITIONER

## 2021-04-30 PROCEDURE — G8400 PT W/DXA NO RESULTS DOC: HCPCS | Performed by: NURSE PRACTITIONER

## 2021-04-30 PROCEDURE — 99213 OFFICE O/P EST LOW 20 MIN: CPT | Performed by: NURSE PRACTITIONER

## 2021-04-30 PROCEDURE — 1123F ACP DISCUSS/DSCN MKR DOCD: CPT | Performed by: NURSE PRACTITIONER

## 2021-04-30 PROCEDURE — G8427 DOCREV CUR MEDS BY ELIG CLIN: HCPCS | Performed by: NURSE PRACTITIONER

## 2021-04-30 PROCEDURE — G8417 CALC BMI ABV UP PARAM F/U: HCPCS | Performed by: NURSE PRACTITIONER

## 2021-04-30 ASSESSMENT — ENCOUNTER SYMPTOMS
RECTAL PAIN: 1
BACK PAIN: 1
VOMITING: 0
ABDOMINAL PAIN: 1
COUGH: 0
SORE THROAT: 0
CHOKING: 0
NAUSEA: 0
SINUS PRESSURE: 0
WHEEZING: 0
VOICE CHANGE: 0
SINUS PAIN: 0
ABDOMINAL DISTENTION: 1
CONSTIPATION: 1
DIARRHEA: 0
ANAL BLEEDING: 0
BLOOD IN STOOL: 0
APNEA: 0
TROUBLE SWALLOWING: 0
SHORTNESS OF BREATH: 0

## 2021-04-30 NOTE — PROGRESS NOTES
GI CLINIC FOLLOW UP    INTERVAL HISTORY:   No referring provider defined for this encounter. Chief Complaint   Patient presents with    Follow-up     Patient is here today to f/u on Xray     HISTORY OF PRESENT ILLNESS:     Patient being seen for follow-up sitz marks study. Patient has chronic idiopathic constipation. No retained markers seen on 5 day KUB. Patient reports constipation improved with Trulance and Lacutlose however she still feels she has a hard time defecating. She has hx of multiple hemorrhoidal surgeries in the past.  Reports having to do manual exercises to help stretch her anus in the past ?  Last colonoscopy in 2019 by [facility]. No strictures noted. No anal/rectal abnormalities reported. Past Medical,Family, and Social History reviewed and does contribute to the patient presentingcondition. Patient's PMH/PSH,SH,PSYCH Hx, MEDs, ALLERGIES, and ROS were all reviewed and updated in the appropriate sections. PAST MEDICAL HISTORY:  Past Medical History:   Diagnosis Date    Age-related cognitive decline     Ankle pain     Left ankle fracture in ortho boat.    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    left with urinary incontinenc post surgical    Caffeine use     2 coffee/day, 1-2 tea per week    Chronic back pain     Chronic ITP (idiopathic thrombocytopenia) (HCC)     CVA (cerebral infarction) 2008, 2010    balance issues, short term memory loss    Diabetic gastroparesis ([address]     Diverticular disease 1986    GERD (gastroesophageal reflux disease)     Hiatal hernia     Hip fracture ([address]     History of blood transfusion     History of decreased platelet count     Hyperlipemia     Hypertension     Internal hemorrhoid     Macular degeneration of left eye 1980's    S/P laser treatment    Nausea and vomiting     Neuropathy     Osteoarthritis     Ringing in ears     Self-catheterizes urinary bladder 6-7 times daily    TIA (transient ischemic attack) 2000    x1    Tubular adenoma     colon polyps    Type II or unspecified type diabetes mellitus without mention of complication, not stated as uncontrolled     Urinary incontinence     frequent UTI s/p infection, surgical dilatation lead to incontinence    Wears dentures     full on top, partial on bottom    Wears glasses        Past Surgical History:   Procedure Laterality Date    APPENDECTOMY  1962    BLADDER SURGERY      bladder stimulator    BLADDER SUSPENSION  2002    multiple    CARDIAC CATHETERIZATION  2008    no stents    CARDIOVASCULAR STRESS TEST  12/2014    CATARACT REMOVAL WITH IMPLANT Left 11/07/2017    [facility]    CATARACT REMOVAL WITH IMPLANT Right 11/28/2017    [facility]    COLON SURGERY      colostomy reversal    COLONOSCOPY  04/07/2016    tubular adenoma x3; internal hemorrhoids    COLONOSCOPY N/A 11/06/2019    COLONOSCOPY DIAGNOSTIC performed by April Sweeney MD at [address] [place] [address]  11/06/2019    COLOSTOMY  1986    bowel obstruction/ rupture from diverticular disease, reversal 3 mos later    CYSTOSCOPY  09/08/2017    W/ 200IU Botox     FRACTURE SURGERY Right 2011    hip    FRACTURE SURGERY Left 2001    [facility]    FRACTURE SURGERY Left 2013    ankle    HERNIA REPAIR      HYSTERECTOMY  1969    JOINT REPLACEMENT Bilateral 2000    BILAT KNEES    LUMBAR FUSION  2017    L2/L3    TX XCAPSL CTRC RMVL INSJ IO LENS PROSTH W/O ECP Left 11/07/2017    EYE CATARACT EMULSIFICATION IOL IMPLANT performed by Yaniv Akins MD at [facility] RMVL INSJ IO LENS PROSTH W/O ECP Right 11/28/2017    EYE CATARACT EMULSIFICATION IOL IMPLANT performed by Yan[facility]kins MD at [facility] Right 10/27/2015    WITH POLY EXCHANGE BIOMET AND GPS APPLICATION    REVISION TOTAL KNEE ARTHROPLASTY Left 07/14/2020    KNEE TOTAL ARTHROPLASTY REVISION - POLY EXCHANGE performed by Millie Perez MD at [address]  2010    fusion, did not take   [address]    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    NEAL AND BSO      TONSILLECTOMY  1978    UPPER GASTROINTESTINAL ENDOSCOPY      UPPER GASTROINTESTINAL ENDOSCOPY  05/05/2014    UPPER GASTROINTESTINAL ENDOSCOPY  04/07/2016    mild gastritis    UPPER GASTROINTESTINAL ENDOSCOPY N/A 07/17/2018    retained thick secretions in proximal esophagus       CURRENT MEDICATIONS:    Current Outpatient Medications:     lidocaine (XYLOCAINE) 2 % jelly, APPLY TOPICALLY AS NEEDED, Disp: , Rfl:     phenazopyridine (PYRIDIUM) 200 MG tablet, Take 1 tablet by mouth 3 times daily as needed for Pain, Disp: 21 tablet, Rfl: 1    Plecanatide (TRULANCE) 3 MG TABS, Take 3 mg by mouth daily, Disp: 60 tablet, Rfl: 3    irbesartan (AVAPRO) 75 MG tablet, Take 1 tablet by mouth daily, Disp: 90 tablet, Rfl: 1    polyethylene glycol (GLYCOLAX) 17 GM/SCOOP powder, Take 17 g by mouth daily 2 tabs every morning, Disp: , Rfl:     Wheat Dextrin (BENEFIBER DRINK MIX PO), Take by mouth 2 tablespoons every night, Disp: , Rfl:     ergocalciferol (ERGOCALCIFEROL) 1.25 MG (82726 UT) capsule, Take 1 capsule by mouth once a week, Disp: , Rfl:     benzonatate (TESSALON) 200 MG capsule, Take 1 capsule by mouth as needed, Disp: , Rfl:     albuterol (PROVENTIL) (5 MG/ML) 0.5% nebulizer solution, Inhale 0.5 mLs into the lungs as needed, Disp: , Rfl:     nystatin (MYCOSTATIN) 909885 UNIT/GM powder, Apply 3 times daily. , Disp: 45 g, Rfl: 3    esomeprazole (NEXIUM) 40 MG delayed release capsule, Take 1 capsule by mouth every morning (before breakfast), Disp: 90 capsule, Rfl: 3    hydrocortisone (ANUSOL-HC) 2.5 % CREA rectal cream, Place rectally 2 times daily, Disp: 28 g, Rfl: 3    vitamin B-12 (CYANOCOBALAMIN) 1000 MCG tablet, Take 1 tablet by mouth once a week, Disp: 30 tablet, Rfl: 3    aspirin 81 MG EC tablet, Take 1 tablet by mouth 2 times daily, Disp: 30 tablet, Rfl: 3    nystatin-triamcinolone (MYCOLOG II) 166079-4.1 UNIT/GM-% cream, Apply topically 4 times daily Apply topically 4 times daily. , Disp: 30 g, Rfl: 3    rOPINIRole (REQUIP) 1 MG tablet, Take by mouth nightly , Disp: , Rfl:     glipiZIDE (GLUCOTROL) 5 MG tablet, 2.5mg in the am and 2.5 mg in the pm, Disp: , Rfl:     Continuous Blood Gluc Sensor (FREESTYLE DAVIS 14 DAY SENSOR) MISC, , Disp: , Rfl:     Salicylic Acid (COMPOUND W EX), , Disp: , Rfl:     LIVALO 2 MG TABS tablet, Take 2 mg by mouth nightly , Disp: , Rfl:     Multiple Vitamins-Minerals (THERAPEUTIC MULTIVITAMIN-MINERALS) tablet, Take 1 tablet by mouth daily, Disp: , Rfl:     Multiple Vitamins-Minerals (PRESERVISION AREDS PO), Take by mouth daily , Disp: , Rfl:     Continuous Blood Gluc  (FREESTYLE DAVIS 14 DAY READER) MATTHEW, , Disp: , Rfl:     donepezil (ARICEPT) 10 MG tablet, Take 10 mg by mouth nightly , Disp: , Rfl:     estradiol (ESTRACE VAGINAL) 0.1 MG/GM vaginal cream, Place 1 g vaginally Twice a Week (Patient taking differently: Place 1 g vaginally every 14 days ), Disp: 1 Tube, Rfl: 5    FARXIGA 10 MG tablet, TAKE 1 TABLET BY MOUTH IN THE MORNING , Disp: 30 tablet, Rfl: 11    CRANBERRY PO, Take by mouth 2 times daily, Disp: , Rfl:     ALLERGIES:   Allergies   Allergen Reactions    Iodides Anaphylaxis, Hives and Itching     \"Contrast dyes\"  This was added by someone but Patient states has had IVP dyes multiple times without problems and had a myelogram in Dec 2016 without problems    Povidone-Iodine Anaphylaxis, Hives and Swelling    Sulfa Antibiotics Hives and Swelling     Other reaction(s): Unknown    Betadine [Povidone Iodine] Hives    Cephalexin Hives and Swelling    Cephalexin Itching     Other reaction(s): Hallucinations  In 1969 received demerol and keflex together so was told to list both as allergies    Cozaar [Losartan] Nausea Only     Pt also gets sores on toungue    Cymbalta [Duloxetine Hcl] Diarrhea and Nausea And Vomiting     Weakness    Demerol Hives and Swelling    Doxycycline Other (See Comments)     Sore mouth        Meperidine Itching     Other reaction(s): Hallucinations      Morphine Hives and Itching    Penicillins Hives, Swelling and Itching     Other reaction(s): Intolerance-unknown  Other reaction(s): Unknown    Zithromax [Azithromycin] Other (See Comments)     Sore mouth      Clindamycin/Lincomycin     Etodolac     Fluorescein     Iodine      Other reaction(s):  Intolerance-unknown    Lisinopril      cough    Penicillin G     Soap     Toviaz [Fesoterodine Fumarate Er]     Metformin And Related Nausea And Vomiting     Diarrhea and N/V       FAMILY HISTORY:       Problem Relation Age of Onset    Breast Cancer Mother     Cancer Mother         breast and uterine  39    Heart Disease Father     Heart Attack Father          28   [address] [facility] Maternal Grandmother     Cancer Brother 46        bronchogenic adenocarcinoma cancer    Lung Cancer Brother     Cancer Sister         lung    Lung Cancer Sister          72    Breast Cancer Sister          62    Cancer Sister         breast         SOCIAL HISTORY:   Social History     Socioeconomic History    Marital status:      Spouse name: Not on file    Number of children: Not on file    Years of education: Not on file    Highest education level: Not on file   Occupational History    Occupation: retired   Social Needs    Financial resource strain: Not very hard    Food insecurity     Worry: Never true     Inability: Never true    Transportation needs     Medical: No     Non-medical: No   Tobacco Use    Smoking status: Former Smoker     Packs/day: 0.50     Years: 20.00     Pack years: 10.00     Quit date: 1982     Years since quittin.9    Smokeless tobacco: Former User     Quit date: 1982   Substance and Sexual Activity    Alcohol use: No    Drug use: No    Sexual activity: Not on file   Lifestyle    Physical activity     Days per week: Not on file     Minutes per session: Not on file    Stress: Not on file   Relationships    Social connections     Talks on phone: Not on file     Gets together: Not on file     Attends Restorationist service: Not on file     Active member of club or organization: Not on file     Attends meetings of clubs or organizations: Not on file     Relationship status: Not on file    Intimate partner violence     Fear of current or ex partner: Not on file     Emotionally abused: Not on file     Physically abused: Not on file     Forced sexual activity: Not on file   Other Topics Concern    Not on file   Social History Narrative    Not on file       REVIEW OF SYSTEMS: A 12-point review of systemswas obtained and pertinent positives and negatives were enumerated above in the history of present illness. All other reviewed systems / symptoms were negative. Review of Systems   Constitutional: Positive for fatigue. Negative for appetite change and unexpected weight change. HENT: Negative for postnasal drip, sinus pressure, sinus pain, sore throat, trouble swallowing and voice change. Eyes: Positive for visual disturbance (Glasses). Respiratory: Negative for apnea, cough, choking, shortness of breath and wheezing. Cardiovascular: Negative for chest pain and leg swelling. Gastrointestinal: Positive for abdominal distention, abdominal pain, constipation and rectal pain. Negative for anal bleeding, blood in stool, diarrhea, nausea and vomiting. Genitourinary: Negative for difficulty urinating. Musculoskeletal: Positive for arthralgias, back pain (lower), gait problem (cane) and myalgias. Neurological: Negative for dizziness, weakness, light-headedness, numbness and headaches. Hematological: Bruises/bleeds easily. Psychiatric/Behavioral: Negative for sleep disturbance. The patient is nervous/anxious. PHYSICAL EXAMINATION: Vital signs reviewed per the nursing documentation. There were no vitals taken for this visit. There is no height or weight on file to calculate BMI. Physical Exam  Vitals signs and nursing note reviewed. Constitutional:       Appearance: She is well-developed. HENT:      Head: Normocephalic and atraumatic. Eyes:      General: No scleral icterus. Conjunctiva/sclera: Conjunctivae normal.      Pupils: Pupils are equal, round, and reactive to light. Neck:      Musculoskeletal: Normal range of motion and neck supple. Thyroid: No thyromegaly. Vascular: No hepatojugular reflux or JVD. Trachea: No tracheal deviation. Cardiovascular:      Rate and Rhythm: Normal rate and regular rhythm. Heart sounds: Normal heart sounds. Pulmonary:      Effort: Pulmonary effort is normal. No respiratory distress. Breath sounds: Normal breath sounds. No wheezing or rales. Abdominal:      General: Bowel sounds are normal. There is no distension. Palpations: Abdomen is soft. There is no hepatomegaly or mass. Tenderness: There is no abdominal tenderness. There is no rebound. Hernia: No hernia is present. Musculoskeletal:         General: No tenderness. Comments: No joint swelling   Lymphadenopathy:      Cervical: No cervical adenopathy. Skin:     General: Skin is warm. Findings: No bruising, ecchymosis, erythema or rash. Neurological:      Mental Status: She is alert and oriented to person, place, and time. Cranial Nerves: No cranial nerve deficit. Psychiatric:         Thought Content:  Thought content normal.           LABORATORY DATA: Reviewed  Lab Results   Component Value Date    WBC 7.6 12/10/2020    HGB 13.7 12/10/2020    HCT 44.2 12/10/2020    MCV 95.3 12/10/2020    PLT See Reflexed IPF Result 12/10/2020     12/10/2020    K 4.0 12/10/2020     (H) 12/10/2020    CO2 17 (L) 12/10/2020    BUN 17 12/10/2020

## 2021-05-07 ENCOUNTER — HOSPITAL ENCOUNTER (OUTPATIENT)
Age: 81
Setting detail: SPECIMEN
Discharge: HOME | End: 2021-05-07
Payer: MEDICARE

## 2021-05-07 DIAGNOSIS — N39.0 RECURRENT UTI: ICD-10-CM

## 2021-05-07 DIAGNOSIS — R39.89 URETHRAL PAIN: ICD-10-CM

## 2021-05-09 LAB
CULTURE: ABNORMAL
Lab: ABNORMAL
SPECIMEN DESCRIPTION: ABNORMAL

## 2021-05-10 ENCOUNTER — OFFICE VISIT (OUTPATIENT)
Dept: ORTHOPEDIC SURGERY | Age: 81
End: 2021-05-10

## 2021-05-10 VITALS — BODY MASS INDEX: 30.84 KG/M2 | HEIGHT: 59 IN | WEIGHT: 153 LBS | RESPIRATION RATE: 12 BRPM

## 2021-05-10 DIAGNOSIS — M19.079 ARTHRITIS OF MIDFOOT: Primary | ICD-10-CM

## 2021-05-10 DIAGNOSIS — E11.42 DIABETIC POLYNEUROPATHY ASSOCIATED WITH TYPE 2 DIABETES MELLITUS (HCC): ICD-10-CM

## 2021-05-10 PROCEDURE — G8427 DOCREV CUR MEDS BY ELIG CLIN: HCPCS | Performed by: ORTHOPAEDIC SURGERY

## 2021-05-10 PROCEDURE — 20600 DRAIN/INJ JOINT/BURSA W/O US: CPT | Performed by: ORTHOPAEDIC SURGERY

## 2021-05-10 PROCEDURE — 1090F PRES/ABSN URINE INCON ASSESS: CPT | Performed by: ORTHOPAEDIC SURGERY

## 2021-05-10 PROCEDURE — 4040F PNEUMOC VAC/ADMIN/RCVD: CPT | Performed by: ORTHOPAEDIC SURGERY

## 2021-05-10 PROCEDURE — G8417 CALC BMI ABV UP PARAM F/U: HCPCS | Performed by: ORTHOPAEDIC SURGERY

## 2021-05-10 PROCEDURE — G8400 PT W/DXA NO RESULTS DOC: HCPCS | Performed by: ORTHOPAEDIC SURGERY

## 2021-05-10 PROCEDURE — 1123F ACP DISCUSS/DSCN MKR DOCD: CPT | Performed by: ORTHOPAEDIC SURGERY

## 2021-05-10 PROCEDURE — 99213 OFFICE O/P EST LOW 20 MIN: CPT | Performed by: ORTHOPAEDIC SURGERY

## 2021-05-10 PROCEDURE — 1036F TOBACCO NON-USER: CPT | Performed by: ORTHOPAEDIC SURGERY

## 2021-05-10 RX ORDER — LIDOCAINE HYDROCHLORIDE 10 MG/ML
2 INJECTION, SOLUTION INFILTRATION; PERINEURAL ONCE
Status: COMPLETED | OUTPATIENT
Start: 2021-05-10 | End: 2021-05-10

## 2021-05-10 RX ORDER — TRIAMCINOLONE ACETONIDE 40 MG/ML
40 INJECTION, SUSPENSION INTRA-ARTICULAR; INTRAMUSCULAR ONCE
Status: COMPLETED | OUTPATIENT
Start: 2021-05-10 | End: 2021-05-10

## 2021-05-10 RX ADMIN — TRIAMCINOLONE ACETONIDE 40 MG: 40 INJECTION, SUSPENSION INTRA-ARTICULAR; INTRAMUSCULAR at 10:37

## 2021-05-10 RX ADMIN — LIDOCAINE HYDROCHLORIDE 2 ML: 10 INJECTION, SOLUTION INFILTRATION; PERINEURAL at 10:37

## 2021-05-10 NOTE — PROGRESS NOTES
[facility] [address] [facility]  [address]  [address] [facility] [address]  Dept: [phone]    Ambulatory Orthopedic Consult      CHIEF COMPLAINT:    Chief Complaint   Patient presents with    Foot Pain     left foot       HISTORY OF PRESENT ILLNESS:      The patient is a 79 y.o. female who is being seen for consultation and evaluation of pain at the dorsal midfoot, which began about 2 years ago. The pain is described mainly with mechanical terms (dull/sharp/throbbing). The pain is worse with activity and better with rest. The patient reports a progressive course. The patient has tried:      [x]  rest/activity modification          []  NSAIDs      []  opiates      []  orthotics        [x]  change in shoes   []  home exercises  [x]  physical therapy      []  CAM boot     []  brace:    []  injection:       []  surgery:      The patient has previously seen a podiatrist for this problem. INTERVAL HISTORY 5/10/2021:  She is seen again today in the office for follow up of a previous issue (as above). Since being seen last, the patient is doing about the same overall. At today's visit, she is using a cane. History is obtained today from:   [x]  the patient     []  EMR     []  one family member/friend    []  multiple family members/friends    []  other:          REVIEW OF SYSTEMS:  Constitutional: Negative for fever. HENT: Negative for tinnitus. Eyes: Negative for pain. Respiratory: Negative for shortness of breath. Cardiovascular: Negative for chest pain. Gastrointestinal: Negative for abdominal pain. Genitourinary: Negative for dysuria. Skin: Negative for rash. Neurological: Negative for headaches. Hematological: Does not bruise/bleed easily.    Musculoskeletal: See HPI for pertinent positives     Past Medical History:    She  has a past medical history of Age-related cognitive decline, Ankle pain, Anxiety, B12 deficiency, Winters esophagus, 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[facility] [address] (1990), Caffeine use, Chronic back pain, Chronic ITP (idiopathic thrombocytopenia) (HCC), CVA (cerebral infarction) (2008, 2010), Diabetic gastroparesis ([facility] [address] Diverticular disease (1986), GERD (gastroesophageal reflux disease), Hiatal hernia, Hip fracture ([facility] [address] History of blood transfusion, History of decreased platelet count, Hyperlipemia, Hypertension, Internal hemorrhoid, Macular degeneration of left eye (1980's), Nausea and vomiting, Neuropathy, Osteoarthritis, Ringing in ears, Self-catheterizes urinary bladder, TIA (transient ischemic attack) (2000), Tubular adenoma, Type II or unspecified type diabetes mellitus without mention of complication, not stated as uncontrolled, Urinary incontinence, Wears dentures, and Wears glasses. Past Surgical History:    She  has a past surgical history that includes bladder suspension (2002); Hysterectomy (1969); Tonsillectomy (1978); Appendectomy (1962); Spine surgery (2010); colostomy (1986); Revision Colostomy (1986); Spine surgery (1990); Upper gastrointestinal endoscopy; Bladder surgery; Upper gastrointestinal endoscopy (05/05/2014); cardiovascular stress test (12/2014); Colon surgery; eunice and bso (cervix removed); fracture surgery (Right, 2011); fracture surgery (Left, 2001); fracture surgery (Left, 2013); Cardiac catheterization (2008); Revision total knee arthroplasty (Right, 10/27/2015); Colonoscopy (04/07/2016); Upper gastrointestinal endoscopy (04/07/2016); lumbar fusion (2017); Cystocopy (09/08/2017); Cataract removal with implant (Left, 11/07/2017); pr xcapsl ctr rmvl insj io lens prosth w/o ecp (Left, 11/07/2017); Cataract removal with implant (Right, 11/28/2017); pr xcapsl ctrc rmvl insj io lens prosth w/o ecp (Right, 11/28/2017); Upper gastrointestinal endoscopy (N/A, 07/17/2018); joint replacement (Bilateral, 2000); hernia repair; Colonoscopy (N/A, 11/06/2019); Revision total knee arthroplasty (Left, 07/14/2020); and Colonoscopy (11/06/2019). Current Medications:     Current Outpatient Medications:     nitrofurantoin, macrocrystal-monohydrate, (MACROBID) 100 MG capsule, Take 1 capsule by mouth 2 times daily, Disp: 14 capsule, Rfl: 0    lidocaine (XYLOCAINE) 2 % jelly, APPLY TOPICALLY AS NEEDED, Disp: , Rfl:     phenazopyridine (PYRIDIUM) 200 MG tablet, Take 1 tablet by mouth 3 times daily as needed for Pain, Disp: 21 tablet, Rfl: 1    Plecanatide (TRULANCE) 3 MG TABS, Take 3 mg by mouth daily, Disp: 60 tablet, Rfl: 3    irbesartan (AVAPRO) 75 MG tablet, Take 1 tablet by mouth daily, Disp: 90 tablet, Rfl: 1    polyethylene glycol (GLYCOLAX) 17 GM/SCOOP powder, Take 17 g by mouth daily 2 tabs every morning, Disp: , Rfl:     Wheat Dextrin (BENEFIBER DRINK MIX PO), Take by mouth 2 tablespoons every night, Disp: , Rfl:     ergocalciferol (ERGOCALCIFEROL) 1.25 MG (39948 UT) capsule, Take 1 capsule by mouth once a week, Disp: , Rfl:     benzonatate (TESSALON) 200 MG capsule, Take 1 capsule by mouth as needed, Disp: , Rfl:     albuterol (PROVENTIL) (5 MG/ML) 0.5% nebulizer solution, Inhale 0.5 mLs into the lungs as needed, Disp: , Rfl:     nystatin (MYCOSTATIN) 156199 UNIT/GM powder, Apply 3 times daily. , Disp: 45 g, Rfl: 3    esomeprazole (NEXIUM) 40 MG delayed release capsule, Take 1 capsule by mouth every morning (before breakfast), Disp: 90 capsule, Rfl: 3    hydrocortisone (ANUSOL-HC) 2.5 % CREA rectal cream, Place rectally 2 times daily, Disp: 28 g, Rfl: 3    vitamin B-12 (CYANOCOBALAMIN) 1000 MCG tablet, Take 1 tablet by mouth once a week, Disp: 30 tablet, Rfl: 3    aspirin 81 MG EC tablet, Take 1 tablet by mouth 2 times daily, Disp: 30 tablet, Rfl: 3    nystatin-triamcinolone (MYCOLOG II) 120136-6.1 UNIT/GM-% cream, Apply topically 4 times daily Apply topically 4 times daily. , Disp: 30 g, Rfl: 3    rOPINIRole (REQUIP) 1 MG tablet, Take by mouth nightly , Disp: , Rfl:     glipiZIDE (GLUCOTROL) 5 MG tablet, 2.5mg in the am and 2.5 mg in the pm, Disp: , Rfl:     Continuous Blood Gluc Sensor (FREESTYLE DAVIS 14 DAY SENSOR) MISC, , Disp: , Rfl:     Salicylic Acid (COMPOUND W EX), , Disp: , Rfl:     LIVALO 2 MG TABS tablet, Take 2 mg by mouth nightly , Disp: , Rfl:     Multiple Vitamins-Minerals (THERAPEUTIC MULTIVITAMIN-MINERALS) tablet, Take 1 tablet by mouth daily, Disp: , Rfl:     Multiple Vitamins-Minerals (PRESERVISION AREDS PO), Take by mouth daily , Disp: , Rfl:     Continuous Blood Gluc  (FREESTYLE DAVIS 14 DAY READER) MATTHEW, , Disp: , Rfl:     donepezil (ARICEPT) 10 MG tablet, Take 10 mg by mouth nightly , Disp: , Rfl:     estradiol (ESTRACE VAGINAL) 0.1 MG/GM vaginal cream, Place 1 g vaginally Twice a Week (Patient taking differently: Place 1 g vaginally every 14 days ), Disp: 1 Tube, Rfl: 5    FARXIGA 10 MG tablet, TAKE 1 TABLET BY MOUTH IN THE MORNING , Disp: 30 tablet, Rfl: 11    CRANBERRY PO, Take by mouth 2 times daily, Disp: , Rfl:      Allergies: Iodides, Povidone-iodine, Sulfa antibiotics, Betadine [povidone iodine], Cephalexin, Cephalexin, Cozaar [losartan], Cymbalta [duloxetine hcl], Demerol, Doxycycline, Meperidine, Morphine, Penicillins, Zithromax [azithromycin], Clindamycin/lincomycin, Etodolac, Fluorescein, Iodine, Lisinopril, Penicillin g, Soap, Toviaz [fesoterodine fumarate er], and Metformin and related    Family History:  family history includes Breast Cancer in her mother and sister; Cancer in her maternal grandmother, mother, sister, and sister; Cancer (age of onset: 46) in her brother; Heart Attack in her father; Heart Disease in her father; Nuha Laity in her brother and sister.     Social History:   Social History     Occupational History    Occupation: retired   Tobacco Use    Smoking status: Former Smoker     Packs/day: 0.50     Years: 20.00     Pack years: 10.00     Quit date: 1982     Years since quittin.9    Smokeless tobacco: Former User     Quit date: 1982   Substance and as above. She has a history of vitamin D deficiency, history of TIA, chronic idiopathic thrombocytopenia, a history of memory loss, history of type 2 diabetes with peripheral neuropathy. I had another discussion today with the patient about the likely diagnosis and its natural history, physical exam and imaging findings, as well as treatment options in detail. We discussed rest/activity modification, swelling control, NSAIDs/Acetaminophen/topical anesthetics, orthotics/shoewear modification, bracing/immobilization, injections, and physical therapy. Surgically, we again discussed the possible left midfoot fusion depending on her symptom control. After again discussing the anticipated postoperative course and weightbearing restrictions, she does report that she would be unable to have surgery, due to the fact that she takes care of her 80year-old  who is blind, and therefore she is needed around the house, and will be unable to be nonweightbearing for the appropriate amount of time. As a result of our discussion including risks/benefits/alternatives, the patient is able to make an informed decision, and has elected to proceed with conservative management. Orders/referrals were placed as below at today's visit. She may continue to use topical lidocaine patches and topical Voltaren gel. She will continue to follow-up with her podiatrist, Dr. Steven Montano for routine diabetic foot care. The patient does report that she did visit the prosthetics and orthotics shop, however, she did not get a rocker-bottom shoe made for her, and was unable to clarify further details about this. I called the prosthetics and orthotics location, and eventually was able to speak directly to the orthotist, and we discussed the patient's care.   I did request a double rocker-bottom shoe with stiff sole, that could also accommodate her custom diabetic inserts, and after communicating this directly, I was told that this would not be a problem, and this could be easily made for the patient at their office location. This was discussed with the patient, and she will follow up with them to get this made. After discussing the options, the patient did wish to proceed with left midfoot injections as below, and we did discuss that given the severity of arthritis, the injections would not be expected to help too significantly or for too long. All questions were answered and the patient agrees with the above plan. The patient will return to clinic in 3 months without x-rays. Left first and second TARSOMETATARSAL JOINT INJECTION PROCEDURE NOTE: After discussing the risks/benefits/alternatives to injection, an informed consent was obtained verbally. The left first and second TMT rosario joints were verified as the correct location and allergies were reviewed. The skin overlying the injection site was cleaned with an alcohol swab followed by a local sterile prep. A 25 gauge needle was introduced into the above location under sterile conditions. A total mixture of 40 mg of Kenalog and 1 mL of 1% Lidocaine without epinephrine was injected--this was divided equally among the first and second joints. The patient was noted to tolerate the procedure well without immediate complication. A dressing was applied and verbal instruction/education was provided. We also discussed the risks of hyperglycemia for the next week. Return if symptoms worsen or fail to improve. Orders Placed This Encounter   Medications    triamcinolone acetonide (KENALOG-40) injection 40 mg    lidocaine 1 % injection 2 mL     No orders of the defined types were placed in this encounter. Arjun Nevarez MD  Orthopedic Surgery, Foot and Ankle        Please excuse any typos/errors, as this note was created with the assistance of voice recognition software.  While intending to generate a document that actually reflects the content of the visit, the document can still have some errors including those of syntax and sound-a-like substitutions which may escape proof reading. In such instances, actual meaning can be extrapolated by context.

## 2021-05-14 ENCOUNTER — HOSPITAL ENCOUNTER (OUTPATIENT)
Age: 81
Setting detail: SPECIMEN
Discharge: HOME | End: 2021-05-14
Payer: MEDICARE

## 2021-05-16 LAB
CULTURE: ABNORMAL
Lab: ABNORMAL
SPECIMEN DESCRIPTION: ABNORMAL

## 2021-06-01 ENCOUNTER — OFFICE VISIT (OUTPATIENT)
Dept: PRIMARY CARE CLINIC | Age: 81
End: 2021-06-01

## 2021-06-01 ENCOUNTER — HOSPITAL ENCOUNTER (OUTPATIENT)
Age: 81
Setting detail: SPECIMEN
Discharge: HOME | End: 2021-06-01
Payer: MEDICARE

## 2021-06-01 VITALS
TEMPERATURE: 97.8 F | WEIGHT: 150.2 LBS | DIASTOLIC BLOOD PRESSURE: 60 MMHG | BODY MASS INDEX: 30.28 KG/M2 | HEIGHT: 59 IN | HEART RATE: 76 BPM | OXYGEN SATURATION: 97 % | SYSTOLIC BLOOD PRESSURE: 124 MMHG

## 2021-06-01 DIAGNOSIS — I10 ESSENTIAL HYPERTENSION: Primary | ICD-10-CM

## 2021-06-01 DIAGNOSIS — K59.04 CHRONIC IDIOPATHIC CONSTIPATION: ICD-10-CM

## 2021-06-01 PROBLEM — J20.9 ACUTE BRONCHITIS: Status: RESOLVED | Noted: 2019-05-01 | Resolved: 2021-06-01

## 2021-06-01 PROCEDURE — G8400 PT W/DXA NO RESULTS DOC: HCPCS | Performed by: FAMILY MEDICINE

## 2021-06-01 PROCEDURE — 1123F ACP DISCUSS/DSCN MKR DOCD: CPT | Performed by: FAMILY MEDICINE

## 2021-06-01 PROCEDURE — G8417 CALC BMI ABV UP PARAM F/U: HCPCS | Performed by: FAMILY MEDICINE

## 2021-06-01 PROCEDURE — 99214 OFFICE O/P EST MOD 30 MIN: CPT | Performed by: FAMILY MEDICINE

## 2021-06-01 PROCEDURE — 4040F PNEUMOC VAC/ADMIN/RCVD: CPT | Performed by: FAMILY MEDICINE

## 2021-06-01 PROCEDURE — G8427 DOCREV CUR MEDS BY ELIG CLIN: HCPCS | Performed by: FAMILY MEDICINE

## 2021-06-01 PROCEDURE — 1090F PRES/ABSN URINE INCON ASSESS: CPT | Performed by: FAMILY MEDICINE

## 2021-06-01 PROCEDURE — 1036F TOBACCO NON-USER: CPT | Performed by: FAMILY MEDICINE

## 2021-06-01 ASSESSMENT — ENCOUNTER SYMPTOMS: CONSTIPATION: 1

## 2021-06-01 NOTE — PROGRESS NOTES
[address] [facility] PRIMARY CARE  [address] [address] [facility] [address]  Dept: [address] Tanya Styles is a 79 y.o. female Established patient, who presents today for her medical conditions/complaintsas noted below. Chief Complaint   Patient presents with    Hypertension     3mth f/u       HPI:     HPI    Reviewed prior notes Endocrine  Reviewed previous Labs    Saw GI doctor and referred to Dr Reji Duckworth. Taking MOM every day per Dr Reji Duckworth and can't go otherwise . Just passes pieces of bowel. He told her she has rectal prolapse. Stomach always upset. No taste buds. No sense of taste. Still takes miralax. Had yeast UTI and saw Dr Ayaz Whiting    LDL Cholesterol (mg/dL)   Date Value   07/06/2020 85   10/17/2018 137 (H)   07/16/2018 64     LDL Calculated (mg/dL)   Date Value   03/12/2015 47   08/18/2014 39       (goal LDL is <100)   AST (U/L)   Date Value   12/10/2020 19     ALT (U/L)   Date Value   12/10/2020 18     BUN (mg/dL)   Date Value   12/10/2020 17     Hemoglobin A1C (%)   Date Value   12/15/2020 6.7     TSH (uIU/mL)   Date Value   06/02/2016 1.800     BP Readings from Last 3 Encounters:   06/01/21 124/60   04/30/21 130/73   04/14/21 133/80          (goal 120/80)    Past Medical History:   Diagnosis Date    Age-related cognitive decline     Ankle pain     Left ankle fracture in ortho boat.    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    left with urinary incontinenc post surgical    Caffeine use     2 coffee/day, 1-2 tea per week    Chronic back pain     Chronic ITP (idiopathic thrombocytopenia) (HCC)     CVA (cerebral infarction) 2008, 2010    balance issues, short term memory loss    Diabetic gastroparesis ([address]     Diverticular disease 1986    GERD (gastroesophageal reflux disease)     Hiatal hernia     Hip fracture ([address]     History of blood transfusion     History of decreased platelet count     Hyperlipemia     Hypertension     Internal hemorrhoid     Macular degeneration of left eye 1980's    S/P laser treatment    Nausea and vomiting     Neuropathy     Osteoarthritis     Ringing in ears     Self-catheterizes urinary bladder     6-7 times daily    TIA (transient ischemic attack) 2000    x1    Tubular adenoma     colon polyps    Type II or unspecified type diabetes mellitus without mention of complication, not stated as uncontrolled     Urinary incontinence     frequent UTI s/p infection, surgical dilatation lead to incontinence    Wears dentures     full on top, partial on bottom    Wears glasses       Past Surgical History:   Procedure Laterality Date    APPENDECTOMY  1962    BLADDER SURGERY      bladder stimulator    BLADDER SUSPENSION  2002    multiple    CARDIAC CATHETERIZATION  2008    no stents    CARDIOVASCULAR STRESS TEST  12/2014    CATARACT REMOVAL WITH IMPLANT Left 11/07/2017    [facility]    CATARACT REMOVAL WITH IMPLANT Right 11/28/2017    [facility]    COLON SURGERY      colostomy reversal    COLONOSCOPY  04/07/2016    tubular adenoma x3; internal hemorrhoids    COLONOSCOPY N/A 11/06/2019    COLONOSCOPY DIAGNOSTIC performed by Gracia Miller MD at [address] [place] [address]  11/06/2019    COLOSTOMY  1986    bowel obstruction/ rupture from diverticular disease, reversal 3 mos later    CYSTOSCOPY  09/08/2017    W/ 200IU Botox     FRACTURE SURGERY Right 2011    hip    FRACTURE SURGERY Left 2001    [facility]    FRACTURE SURGERY Left 2013    ankle    HERNIA REPAIR      HYSTERECTOMY  1969    JOINT REPLACEMENT Bilateral 2000    BILAT KNEES    LUMBAR FUSION  2017    L2/L3    TX XCAPSL CTRC RMVL INSJ IO LENS PROSTH W/O ECP Left 11/07/2017    EYE CATARACT EMULSIFICATION IOL IMPLANT performed by Cait Vilchis MD at [facility] RMVL INSJ IO LENS PROSTH W/O ECP Right 11/28/2017    EYE CATARACT EMULSIFICATION IOL IMPLANT performed by Sheri Whitaker MD at [facility] Right 10/27/2015    WITH POLY EXCHANGE BIOMET AND GPS APPLICATION    REVISION TOTAL KNEE ARTHROPLASTY Left 2020    KNEE TOTAL ARTHROPLASTY REVISION - POLY EXCHANGE performed by Caroline Dowd MD at [address] [place] [address]      fusion, did not take   [address]    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    NEAL AND BSO      TONSILLECTOMY  1978    UPPER GASTROINTESTINAL ENDOSCOPY      UPPER GASTROINTESTINAL ENDOSCOPY  2014    UPPER GASTROINTESTINAL ENDOSCOPY  2016    mild gastritis    UPPER GASTROINTESTINAL ENDOSCOPY N/A 2018    retained thick secretions in proximal esophagus       Family History   Problem Relation Age of Onset    Breast Cancer Mother     Cancer Mother         breast and uterine  39    Heart Disease Father     Heart Attack Father          28   [address] Maternal Grandmother     Cancer Brother 46        bronchogenic adenocarcinoma cancer    Lung Cancer Brother     Cancer Sister         lung    Lung Cancer Sister          72    Breast Cancer Sister          62    Cancer Sister         breast       Social History     Tobacco Use    Smoking status: Former Smoker     Packs/day: 0.50     Years: 20.00     Pack years: 10.00     Quit date: 1982     Years since quittin.0    Smokeless tobacco: Former User     Quit date: 1982   Substance Use Topics    Alcohol use: No      Current Outpatient Medications   Medication Sig Dispense Refill    lidocaine (XYLOCAINE) 2 % jelly APPLY TOPICALLY AS NEEDED      phenazopyridine (PYRIDIUM) 200 MG tablet Take 1 tablet by mouth 3 times daily as needed for Pain 21 tablet 1    Plecanatide (TRULANCE) 3 MG TABS Take 3 mg by mouth daily 60 tablet 3    irbesartan (AVAPRO) 75 MG tablet Take 1 tablet by mouth daily 90 tablet 1    polyethylene glycol (GLYCOLAX) 17 GM/SCOOP powder Take 17 g by mouth daily 2 tabs every morning      Wheat Dextrin (BENEFIBER DRINK MIX PO) Take by mouth 2 tablespoons every night      ergocalciferol (ERGOCALCIFEROL) 1.25 MG (82857 UT) capsule Take 1 capsule by mouth once a week      benzonatate (TESSALON) 200 MG capsule Take 1 capsule by mouth as needed      albuterol (PROVENTIL) (5 MG/ML) 0.5% nebulizer solution Inhale 0.5 mLs into the lungs as needed      nystatin (MYCOSTATIN) 964182 UNIT/GM powder Apply 3 times daily. 45 g 3    esomeprazole (NEXIUM) 40 MG delayed release capsule Take 1 capsule by mouth every morning (before breakfast) 90 capsule 3    hydrocortisone (ANUSOL-HC) 2.5 % CREA rectal cream Place rectally 2 times daily 28 g 3    vitamin B-12 (CYANOCOBALAMIN) 1000 MCG tablet Take 1 tablet by mouth once a week 30 tablet 3    aspirin 81 MG EC tablet Take 1 tablet by mouth 2 times daily 30 tablet 3    nystatin-triamcinolone (MYCOLOG II) 974318-6.1 UNIT/GM-% cream Apply topically 4 times daily Apply topically 4 times daily.  30 g 3    rOPINIRole (REQUIP) 1 MG tablet Take by mouth nightly       glipiZIDE (GLUCOTROL) 5 MG tablet 2.5mg in the am and 2.5 mg in the pm      Continuous Blood Gluc Sensor (FREESTYLE DAVIS 14 DAY SENSOR) MISC       Salicylic Acid (COMPOUND W EX)       LIVALO 2 MG TABS tablet Take 2 mg by mouth nightly       Multiple Vitamins-Minerals (THERAPEUTIC MULTIVITAMIN-MINERALS) tablet Take 1 tablet by mouth daily      Multiple Vitamins-Minerals (PRESERVISION AREDS PO) Take by mouth daily       Continuous Blood Gluc  (FREESTYLE DAVIS 14 DAY READER) MATTHEW       donepezil (ARICEPT) 10 MG tablet Take 10 mg by mouth nightly       estradiol (ESTRACE VAGINAL) 0.1 MG/GM vaginal cream Place 1 g vaginally Twice a Week (Patient taking differently: Place 1 g vaginally every 14 days ) 1 Tube 5    FARXIGA 10 MG tablet TAKE 1 TABLET BY MOUTH IN THE MORNING  30 tablet 11    CRANBERRY PO Take by mouth 2 times daily       No Completed    Shingles Vaccine  Completed    Pneumococcal 65+ years Vaccine  Completed    COVID-19 Vaccine  Completed    DEXA (modify frequency per FRAX score)  Addressed    Hepatitis A vaccine  Aged Out    Hib vaccine  Aged Out    Meningococcal (ACWY) vaccine  Aged Out       Subjective:      Review of Systems   Constitutional: Negative. Gastrointestinal: Positive for constipation. Genitourinary:        Has to catheterize herself, sometimes it hurts    Tried botox injection for the bladder pain: no help       Objective:     /60   Pulse 76   Temp 97.8 °F (36.6 °C)   Ht 4' 11\" (1.499 m)   Wt 150 lb 3.2 oz (68.1 kg)   SpO2 97%   BMI 30.34 kg/m²   Physical Exam  Vitals and nursing note reviewed. Constitutional:       General: She is not in acute distress. Appearance: She is well-developed. She is not ill-appearing. HENT:      Head: Normocephalic and atraumatic. Right Ear: Tympanic membrane, ear canal and external ear normal.      Left Ear: Tympanic membrane, ear canal and external ear normal.      Mouth/Throat:      Mouth: Mucous membranes are moist.   Eyes:      General: No scleral icterus. Right eye: No discharge. Left eye: No discharge. Conjunctiva/sclera: Conjunctivae normal.      Pupils: Pupils are equal, round, and reactive to light. Neck:      Thyroid: No thyromegaly. Vascular: No carotid bruit. Trachea: No tracheal deviation. Cardiovascular:      Rate and Rhythm: Normal rate and regular rhythm. Heart sounds: Normal heart sounds. Pulmonary:      Effort: Pulmonary effort is normal. No respiratory distress. Breath sounds: Normal breath sounds. No wheezing. Musculoskeletal:      Right lower leg: No edema. Left lower leg: No edema. Lymphadenopathy:      Cervical: No cervical adenopathy. Skin:     General: Skin is warm. Findings: No rash.    Neurological:      Mental Status: She is alert and oriented to person, place, and time.   Psychiatric:         Mood and Affect: Mood normal.         Behavior: Behavior normal.         Thought Content: Thought content normal.         Assessment:       Diagnosis Orders   1. Essential hypertension     2. Chronic idiopathic constipation          Plan:      Return if symptoms worsen or fail to improve. Try taking prune juice also along with MOM. Try swishing mouth out with half a cup of warm water, with half a teaspoon salt, with whole teaspoon of baking soda and do this twice a day and see if her sense of taste improves. No orders of the defined types were placed in this encounter. No orders of the defined types were placed in this encounter. Patient given educationalmaterials - see patient instructions. Discussed use, benefit, and side effectsof prescribed medications. All patient questions answered. Pt voiced understanding. Reviewed health maintenance. Instructed to continue current medications, diet andexercise. Patient agreed with treatment plan. Follow up as directed.      Electronicallysigned by Adeline Bradley MD on 6/1/2021 at 2:56 PM

## 2021-06-03 LAB
CULTURE: ABNORMAL
Lab: ABNORMAL
SPECIMEN DESCRIPTION: ABNORMAL

## 2021-06-07 ENCOUNTER — OFFICE VISIT (OUTPATIENT)
Dept: PODIATRY | Age: 81
End: 2021-06-07
Payer: MEDICARE

## 2021-06-07 VITALS — WEIGHT: 148 LBS | BODY MASS INDEX: 29.84 KG/M2 | HEIGHT: 59 IN

## 2021-06-07 DIAGNOSIS — B35.1 ONYCHOMYCOSIS OF TOENAIL: Primary | ICD-10-CM

## 2021-06-07 DIAGNOSIS — E11.51 TYPE 2 DIABETES MELLITUS WITH PERIPHERAL VASCULAR DISEASE (HCC): ICD-10-CM

## 2021-06-07 DIAGNOSIS — M79.674 PAIN OF TOES OF BOTH FEET: ICD-10-CM

## 2021-06-07 DIAGNOSIS — M79.675 PAIN OF TOES OF BOTH FEET: ICD-10-CM

## 2021-06-07 PROCEDURE — 11721 DEBRIDE NAIL 6 OR MORE: CPT | Performed by: PODIATRIST

## 2021-06-10 ASSESSMENT — ENCOUNTER SYMPTOMS
COLOR CHANGE: 0
DIARRHEA: 0
BACK PAIN: 0
SHORTNESS OF BREATH: 0
NAUSEA: 0

## 2021-06-10 NOTE — PROGRESS NOTES
SUBJECTIVE: Lubna Calloway is a 79 y.o. female who returns to the office with chief complaint of painful fungal toenails. Patient relates toe nails are thickened/difficult to trim as well as painful with ambulation and with shoe gear. Chief Complaint   Patient presents with    Nail Problem     nail trim/last saw Tomy Goodman 6/1/21    Diabetes     last blood sugar 122     Review of Systems   Constitutional: Negative for activity change, appetite change, chills, diaphoresis, fatigue and fever. Respiratory: Negative for shortness of breath. Cardiovascular: Negative for leg swelling. Gastrointestinal: Negative for diarrhea and nausea. Endocrine: Negative for cold intolerance, heat intolerance and polyuria. Musculoskeletal: Positive for arthralgias. Negative for back pain, gait problem, joint swelling and myalgias. Skin: Negative for color change, pallor, rash and wound. Allergic/Immunologic: Negative for environmental allergies and food allergies. Neurological: Negative for dizziness, weakness, light-headedness and numbness. Hematological: Does not bruise/bleed easily. Psychiatric/Behavioral: Negative for behavioral problems, confusion and self-injury. The patient is not nervous/anxious. OBJECTIVE: Clinical evaluation of patient reveals nails 1,2,3,4,5 of the right foot and nails 1,2,3,4,5, of the left foot to present with thickness, elongation, discoloration, brittleness, and subungual debris. There was pain with palpation and debridement of the toenails of the bilateral feet. No open lesions noted to either foot today. The right DP pulse is palpable. The left DP pulse is not palpable. The right PT pulse is palpable. The left PT pulse is not palpable. Protective sensation is present to the right plantar foot as noted with a 5.07 Laurier-Dean monofilament. Protective sensation is present to the left plantar foot as noted with a 5.07 Laurier-Dean monofilament. Glucose: 122 mg/dl. Class A Findings (1 needed)   [] Non-traumatic amputation of foot or integral skeleton portion thereof. [] Q7.      Class B Findings (2 needed)   1. [x] Absent posterior tibial pulse   2. [x] Absent dorsalis pedis pulse   3. [] Advanced trophic changes; three of the following are required:   ·         [] hair growth (decrease or absence)   ·         [] nail changes (thickening)   ·         [] pigmentary changes (discoloration)   ·         [] skin texture (thin, shiny)   ·         [] skin color (rubor or redness)   [x] Q8.      Class C Findings (1 Class B, 2 Class C needed)   1. [] Claudication   2. [] Temperature changes   3. [] Edema   4. [] Paresthesia   5. [] Burning   [] Q9.     ASSESSMENT:    Diagnosis Orders   1. Onychomycosis of toenail  DE DEBRIDEMENT OF NAILS, 6 OR MORE    HM DIABETES FOOT EXAM   2. Pain of toes of both feet  DE DEBRIDEMENT OF NAILS, 6 OR MORE    HM DIABETES FOOT EXAM   3. Type 2 diabetes mellitus with peripheral vascular disease (HCC)  DE DEBRIDEMENT OF NAILS, 6 OR MORE    HM DIABETES FOOT EXAM     PLAN: Toenails 1,2,3,4,5 of the right foot and 1,2,3,4,5 of the left foot were debrided in length and thickness using a nail nipper and a . Return in about 9 weeks (around 2021) for At risk diabetic foot care.    2021      Kai Fraser DPM

## 2021-07-18 NOTE — CARE COORDINATION
Joint Replacement Discharge Planning Note:    Admission Date:  7/14/2020 Danielle Kilpatrick is a 67 y.o.  female    Admitted for : Primary osteoarthritis of left knee [M17.12]  Primary osteoarthritis of left knee [M17.12]  Primary osteoarthritis of left knee [M17.12]    Met with:  Patient and Family    PCP:  Roel Kimball MD              Insurance:  Medicare    Current Residence/ Living Arrangements:  independently at home with spouse            Current Services PTA:  No    Is patient agreeable to [address] [employer]: Yes    Is patient agreeable to outpatient physical therapy:  Yes    Freedom of choice provided: yes    [employer] Agency/Outpatient Therapy chosen:  Estefani Dempsey. Notified Yuliana Reynoso from [facility] of referral and that pt will be discharged home today. Potential [facility] needed: No    Current home DME:  walker    Pharmacy:  [facility]    Does Patient want to use MEDS to BEDS?([facility] only) No    Transportation Provider:  Patient and Family                       Discharge Plan:   Patient intends to discharge to Home    Patient does not need a wheeled walker.      Anticipated discharge date 7/14/2020      Readmission Risk              Risk of Unplanned Readmission:        8           Electronically signed by: Gerry Stevens RN on 7/14/2020 at 2:50 PM
Patent

## 2021-07-26 ENCOUNTER — NURSE TRIAGE (OUTPATIENT)
Dept: OTHER | Facility: CLINIC | Age: 81
End: 2021-07-26

## 2021-07-26 NOTE — TELEPHONE ENCOUNTER
Received call from Najma Damon at [facility] with [employer]. Brief description of triage: repeated UTI's, dysuria, pus in urine and on antibiotic, Self cath x 21 yrs    Triage indicates for patient to be seen today or tomorrow in office     Care advice provided, patient verbalizes understanding; denies any other questions or concerns; instructed to call back for any new or worsening symptoms. Writer provided warm transfer to [facility] at [facility] for appointment scheduling. Attention Provider: Thank you for allowing me to participate in the care of your patient. The patient was connected to triage in response to information provided to the [facility]. Please do not respond through this encounter as the response is not directed to a shared pool. Reason for Disposition   Patient wants to be seen    Answer Assessment - Initial Assessment Questions  1. SYMPTOM: \"What's the main symptom you're concerned about? \" (e.g., frequency, incontinence)      Repeated UTI's, has been to urologist, Infectious Dis, to Dr. Francisco Calderon, to [facility], and has been on repeated prescriptions for [address] and today a new Rx. Wonders if labs need to be done, feels anxious and nervous. 2. ONSET: \"When did the  UTI  start? \"      Consistently since March    3. PAIN: \"Is there any pain? \" If so, ask: \"How bad is it? \" (Scale: 1-10; mild, moderate, severe)      Pain will get better and then flare back up. 4. CAUSE: \"What do you think is causing the symptoms? \"      She is not sure, says not getting better    5. OTHER SYMPTOMS: \"Do you have any other symptoms? \" (e.g., fever, flank pain, blood in urine, pain with urination)      No fever, gets chills, fatigue, pain with urination after the self-cath. , urine looks grey and thick    6. PREGNANCY: \"Is there any chance you are pregnant? \" \"When was your last menstrual period? \"     N/a    Protocols used: URINARY SYMPTOMS-ADULT-[place]

## 2021-07-27 ENCOUNTER — OFFICE VISIT (OUTPATIENT)
Dept: PRIMARY CARE CLINIC | Age: 81
End: 2021-07-27
Payer: MEDICARE

## 2021-07-27 ENCOUNTER — HOSPITAL ENCOUNTER (OUTPATIENT)
Age: 81
Setting detail: SPECIMEN
Discharge: HOME | End: 2021-07-27
Payer: MEDICARE

## 2021-07-27 VITALS
HEART RATE: 72 BPM | BODY MASS INDEX: 31.35 KG/M2 | WEIGHT: 155.2 LBS | SYSTOLIC BLOOD PRESSURE: 120 MMHG | OXYGEN SATURATION: 95 % | DIASTOLIC BLOOD PRESSURE: 70 MMHG

## 2021-07-27 DIAGNOSIS — F33.40 RECURRENT MAJOR DEPRESSIVE DISORDER, IN REMISSION (HCC): ICD-10-CM

## 2021-07-27 DIAGNOSIS — N39.0 RECURRENT UTI: Primary | ICD-10-CM

## 2021-07-27 DIAGNOSIS — I10 ESSENTIAL HYPERTENSION: ICD-10-CM

## 2021-07-27 DIAGNOSIS — R53.82 CHRONIC FATIGUE: ICD-10-CM

## 2021-07-27 LAB
BILIRUBIN, POC: NORMAL
BLOOD URINE, POC: NORMAL
CLARITY, POC: NORMAL
COLOR, POC: YELLOW
GLUCOSE URINE, POC: NORMAL
KETONES, POC: 15
LEUKOCYTE EST, POC: NORMAL
NITRITE, POC: NORMAL
PH, POC: 5
PROTEIN, POC: NORMAL
SPECIFIC GRAVITY, POC: >1.03
UROBILINOGEN, POC: 0.2

## 2021-07-27 PROCEDURE — G8400 PT W/DXA NO RESULTS DOC: HCPCS | Performed by: FAMILY MEDICINE

## 2021-07-27 PROCEDURE — G8417 CALC BMI ABV UP PARAM F/U: HCPCS | Performed by: FAMILY MEDICINE

## 2021-07-27 PROCEDURE — G8427 DOCREV CUR MEDS BY ELIG CLIN: HCPCS | Performed by: FAMILY MEDICINE

## 2021-07-27 PROCEDURE — 99214 OFFICE O/P EST MOD 30 MIN: CPT | Performed by: FAMILY MEDICINE

## 2021-07-27 PROCEDURE — 4040F PNEUMOC VAC/ADMIN/RCVD: CPT | Performed by: FAMILY MEDICINE

## 2021-07-27 PROCEDURE — 1036F TOBACCO NON-USER: CPT | Performed by: FAMILY MEDICINE

## 2021-07-27 PROCEDURE — 1123F ACP DISCUSS/DSCN MKR DOCD: CPT | Performed by: FAMILY MEDICINE

## 2021-07-27 PROCEDURE — 1090F PRES/ABSN URINE INCON ASSESS: CPT | Performed by: FAMILY MEDICINE

## 2021-07-27 PROCEDURE — 81002 URINALYSIS NONAUTO W/O SCOPE: CPT | Performed by: FAMILY MEDICINE

## 2021-07-27 NOTE — PROGRESS NOTES
[address] [place] [address] PRIMARY CARE  [address] [place] [address]  Dept: [address] [facility] [address] Renny Merlin is a 79 y.o. female Established patient, who presents today for her medical conditions/complaintsas noted below. Chief Complaint   Patient presents with    Urinary Frequency     Patient states that she has urinary pain, urgency,        HPI:     HPI  Gets recurrent UTI's and gets pain  Had urine cx last week. Reviewed med list and her allergy list  Started macrobid 7/21/21 and got another script 7/27/21  today [facility]  Has sees urology. [facility] It frustrated with seeing so many doctors and not getting better. Taking cranberry juice and lots of water. Reviewed prior notes urology  Reviewed previous Labs    has to self cath and gets pain when urine is draining  Saw Dr Alexandrea Neal urologist at [facility] Dr Asa Lan re: her bowels in June. LDL Cholesterol (mg/dL)   Date Value   07/06/2020 85   10/17/2018 137 (H)   07/16/2018 64     LDL Calculated (mg/dL)   Date Value   03/12/2015 47   08/18/2014 39       (goal LDL is <100)   AST (U/L)   Date Value   12/10/2020 19     ALT (U/L)   Date Value   12/10/2020 18     BUN (mg/dL)   Date Value   12/10/2020 17     Hemoglobin A1C (%)   Date Value   12/15/2020 6.7     TSH (uIU/mL)   Date Value   06/02/2016 1.800     BP Readings from Last 3 Encounters:   07/27/21 120/70   06/01/21 124/60   04/30/21 130/73          (goal 120/80)    Past Medical History:   Diagnosis Date    Age-related cognitive decline     Ankle pain     Left ankle fracture in ortho boat.    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    Caffeine use     2 coffee/day, 1-2 tea per week    Chronic back pain     Chronic ITP (idiopathic thrombocytopenia) (HCC)     CVA (cerebral infarction) 2008, 2010    balance issues, short term memory loss    Left 2017    EYE CATARACT EMULSIFICATION IOL IMPLANT performed by Radha Hassan MD at [facility] 60 RMVL INSJ IO LENS PROSTH W/O ECP Right 2017    EYE CATARACT EMULSIFICATION IOL IMPLANT performed by Radha Hassan MD at [facility] Right 10/27/2015    WITH POLY EXCHANGE BIOMET AND GPS APPLICATION    REVISION TOTAL KNEE ARTHROPLASTY Left 2020    KNEE TOTAL ARTHROPLASTY REVISION - POLY EXCHANGE performed by Greg Richards MD at [address] [place] [address]       fusion, did not take   [address]    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    NEAL AND BSO      TONSILLECTOMY      UPPER GASTROINTESTINAL ENDOSCOPY      UPPER GASTROINTESTINAL ENDOSCOPY  2014    UPPER GASTROINTESTINAL ENDOSCOPY  2016    mild gastritis    UPPER GASTROINTESTINAL ENDOSCOPY N/A 2018    retained thick secretions in proximal esophagus       Family History   Problem Relation Age of Onset    Breast Cancer Mother     Cancer Mother         breast and uterine  39    Heart Disease Father     Heart Attack Father          28   [address] [facility] Maternal Grandmother     Cancer Brother 46        bronchogenic adenocarcinoma cancer    Lung Cancer Brother     Cancer Sister         lung    Lung Cancer Sister          72    Breast Cancer Sister          62    Cancer Sister         breast       Social History     Tobacco Use    Smoking status: Former Smoker     Packs/day: 0.50     Years: 20.00     Pack years: 10.00     Quit date: 1982     Years since quittin.1    Smokeless tobacco: Former User     Quit date: 1982   Substance Use Topics    Alcohol use: No      Current Outpatient Medications   Medication Sig Dispense Refill    sertraline (ZOLOFT) 50 MG tablet Take 1 tablet by mouth daily 30 tablet 5    irbesartan (AVAPRO) 75 MG tablet TAKE 1 TABLET BY MOUTH ONE TIME A DAY 30 tablet 5    lidocaine (XYLOCAINE) 2 % jelly APPLY TOPICALLY AS NEEDED      ergocalciferol (ERGOCALCIFEROL) 1.25 MG (29982 UT) capsule Take 1 capsule by mouth once a week      benzonatate (TESSALON) 200 MG capsule Take 1 capsule by mouth as needed      albuterol (PROVENTIL) (5 MG/ML) 0.5% nebulizer solution Inhale 0.5 mLs into the lungs as needed      nystatin (MYCOSTATIN) 909117 UNIT/GM powder Apply 3 times daily. 45 g 3    esomeprazole (NEXIUM) 40 MG delayed release capsule Take 1 capsule by mouth every morning (before breakfast) 90 capsule 3    hydrocortisone (ANUSOL-HC) 2.5 % CREA rectal cream Place rectally 2 times daily 28 g 3    vitamin B-12 (CYANOCOBALAMIN) 1000 MCG tablet Take 1 tablet by mouth once a week 30 tablet 3    aspirin 81 MG EC tablet Take 1 tablet by mouth 2 times daily 30 tablet 3    nystatin-triamcinolone (MYCOLOG II) 044487-3.1 UNIT/GM-% cream Apply topically 4 times daily Apply topically 4 times daily.  30 g 3    rOPINIRole (REQUIP) 1 MG tablet Take by mouth nightly       glipiZIDE (GLUCOTROL) 5 MG tablet 2.5mg in the am and 2.5 mg in the pm      Continuous Blood Gluc Sensor (FREESTYLE DAVIS 14 DAY SENSOR) MISC       Salicylic Acid (COMPOUND W EX)       LIVALO 2 MG TABS tablet Take 2 mg by mouth nightly       Multiple Vitamins-Minerals (THERAPEUTIC MULTIVITAMIN-MINERALS) tablet Take 1 tablet by mouth daily      Multiple Vitamins-Minerals (PRESERVISION AREDS PO) Take by mouth daily       Continuous Blood Gluc  (FREESTYLE DAVIS 14 DAY READER) MATTHEW       donepezil (ARICEPT) 10 MG tablet Take 10 mg by mouth nightly       estradiol (ESTRACE VAGINAL) 0.1 MG/GM vaginal cream Place 1 g vaginally Twice a Week (Patient taking differently: Place 1 g vaginally every 14 days ) 1 Tube 5    CRANBERRY PO Take by mouth 2 times daily      polyethylene glycol (GLYCOLAX) 17 GM/SCOOP powder Take 17 g by mouth daily 2 tabs every morning (Patient not taking: Reported on 7/27/2021)  Wheat Dextrin (BENEFIBER DRINK MIX PO) Take by mouth 2 tablespoons every night (Patient not taking: Reported on 7/27/2021)      FARXIGA 10 MG tablet TAKE 1 TABLET BY MOUTH IN THE MORNING  (Patient not taking: Reported on 7/27/2021) 30 tablet 11     No current facility-administered medications for this visit. Facility-Administered Medications Ordered in Other Visits   Medication Dose Route Frequency Provider Last Rate Last Admin    0.9 % sodium chloride infusion 250 mL  250 mL Intravenous Once Kecia Jo MD         Allergies   Allergen Reactions    Iodides Anaphylaxis, Hives and Itching     \"Contrast dyes\"  This was added by someone but Patient states has had IVP dyes multiple times without problems and had a myelogram in Dec 2016 without problems    Povidone-Iodine Anaphylaxis, Hives and Swelling    Sulfa Antibiotics Hives and Swelling     Other reaction(s): Unknown    Betadine [Povidone Iodine] Hives    Cephalexin Hives and Swelling    Cephalexin Itching     Other reaction(s): Hallucinations  In 1969 received demerol and keflex together so was told to list both as allergies    Cozaar [Losartan] Nausea Only     Pt also gets sores on toungue    Cymbalta [Duloxetine Hcl] Diarrhea and Nausea And Vomiting     Weakness    Demerol Hives and Swelling    Doxycycline Other (See Comments)     Sore mouth        Meperidine Itching     Other reaction(s): Hallucinations      Morphine Hives and Itching    Penicillins Hives, Swelling and Itching     Other reaction(s): Intolerance-unknown  Other reaction(s): Unknown    Zithromax [Azithromycin] Other (See Comments)     Sore mouth      Clindamycin/Lincomycin     Etodolac     Fluorescein     Iodine      Other reaction(s):  Intolerance-unknown    Lisinopril      cough    Penicillin G     Soap     Toviaz [Fesoterodine Fumarate Er]     Metformin And Related Nausea And Vomiting     Diarrhea and N/V       Health Maintenance   Topic Date Due    Hepatitis C screen  Never done   [employer] Visit (AWV)  Never done    Lipid screen  07/06/2021    DTaP/Tdap/Td vaccine (1 - Tdap) 01/09/2022 (Originally 8/12/1959)    Flu vaccine (1) 09/01/2021    Potassium monitoring  12/10/2021    Creatinine monitoring  12/10/2021    Shingles Vaccine  Completed    Pneumococcal 65+ years Vaccine  Completed    COVID-19 Vaccine  Completed    DEXA (modify frequency per FRAX score)  Addressed    Hepatitis A vaccine  Aged Out    Hib vaccine  Aged Out    Meningococcal (ACWY) vaccine  Aged Out       Subjective:      Review of Systems   Constitutional: Positive for fatigue. Psychiatric/Behavioral: Positive for dysphoric mood. wants to sleep all the time, is depressed, tearful. Sugars are good. She is very frustrated with seeing all these specialists: ID and 2 urologists and rectal specialist and is still having a lot of bladder pain and resistant UTI's  Had her implant removed on one side, has to get the other one removed before she can have an MRI. Has to self cath  Objective:     /70   Pulse 72   Wt 155 lb 3.2 oz (70.4 kg)   SpO2 95%   BMI 31.35 kg/m²   Physical Exam  Tearful. Upset  Well healed incision on buttock    Assessment:       Diagnosis Orders   1. Recurrent UTI  POCT Urinalysis no Micro    Culture, Urine    CBC   2. Recurrent major depressive disorder, in remission (HCC)  sertraline (ZOLOFT) 50 MG tablet   3. Chronic fatigue  Vitamin B12    TSH With Reflex Ft4    CBC   4. Essential hypertension  Comprehensive Metabolic Panel    CBC        Plan:      Return in about 4 weeks (around 8/24/2021) for depression. Is going to see urology again in August.   I would suggest staying on macrobid for 3 weeks. She should go back to her original urologist and not see the 2nd urologist.   Start small dose zoloft for depression and stress. Call prn.    If she still has issues with resistant uti I will talk to Dr Angela Purvis This Encounter   Procedures    Culture, Urine     Standing Status:   Future     Standing Expiration Date:   7/27/2022     Order Specific Question:   Specify (ex-cath, midstream, cysto, etc)? Answer:   catheter    Vitamin B12     Standing Status:   Future     Standing Expiration Date:   7/27/2022    TSH With Reflex Ft4     Standing Status:   Future     Standing Expiration Date:   7/27/2022    Comprehensive Metabolic Panel     Standing Status:   Future     Standing Expiration Date:   7/27/2022    CBC     Standing Status:   Future     Standing Expiration Date:   7/27/2022    POCT Urinalysis no Micro     Orders Placed This Encounter   Medications    sertraline (ZOLOFT) 50 MG tablet     Sig: Take 1 tablet by mouth daily     Dispense:  30 tablet     Refill:  5       Patient given educationalmaterials - see patient instructions. Discussed use, benefit, and side effectsof prescribed medications. All patient questions answered. Pt voiced understanding. Reviewed health maintenance. Instructed to continue current medications, diet andexercise. Patient agreed with treatment plan. Follow up as directed.      Electronicallysigned by Isabel Beltran MD on 7/27/2021 at 6:23 PM

## 2021-07-28 ENCOUNTER — HOSPITAL ENCOUNTER (OUTPATIENT)
Age: 81
Setting detail: SPECIMEN
Discharge: HOME | End: 2021-07-28
Payer: MEDICARE

## 2021-07-28 DIAGNOSIS — N39.0 RECURRENT UTI: ICD-10-CM

## 2021-07-28 DIAGNOSIS — R53.82 CHRONIC FATIGUE: ICD-10-CM

## 2021-07-28 DIAGNOSIS — I10 ESSENTIAL HYPERTENSION: ICD-10-CM

## 2021-07-28 LAB
ALBUMIN SERPL-MCNC: 4.1 G/DL (ref 3.5–5.2)
ALBUMIN/GLOBULIN RATIO: 1.6 (ref 1–2.5)
ALP BLD-CCNC: 70 U/L (ref 35–104)
ALT SERPL-CCNC: 18 U/L (ref 5–33)
ANION GAP SERPL CALCULATED.3IONS-SCNC: 13 MMOL/L (ref 9–17)
AST SERPL-CCNC: 17 U/L
BILIRUB SERPL-MCNC: 0.58 MG/DL (ref 0.3–1.2)
BUN BLDV-MCNC: 22 MG/DL (ref 8–23)
BUN/CREAT BLD: ABNORMAL (ref 9–20)
CALCIUM SERPL-MCNC: 9.2 MG/DL (ref 8.6–10.4)
CHLORIDE BLD-SCNC: 107 MMOL/L (ref 98–107)
CO2: 20 MMOL/L (ref 20–31)
CREAT SERPL-MCNC: 0.7 MG/DL (ref 0.5–0.9)
GFR AFRICAN AMERICAN: >60 ML/MIN
GFR NON-AFRICAN AMERICAN: >60 ML/MIN
GFR SERPL CREATININE-BSD FRML MDRD: ABNORMAL ML/MIN/{1.73_M2}
GFR SERPL CREATININE-BSD FRML MDRD: ABNORMAL ML/MIN/{1.73_M2}
GLUCOSE BLD-MCNC: 146 MG/DL (ref 70–99)
HCT VFR BLD CALC: 41.9 % (ref 36.3–47.1)
HEMOGLOBIN: 12.9 G/DL (ref 11.9–15.1)
MCH RBC QN AUTO: 29.8 PG (ref 25.2–33.5)
MCHC RBC AUTO-ENTMCNC: 30.8 G/DL (ref 28.4–34.8)
MCV RBC AUTO: 96.8 FL (ref 82.6–102.9)
NRBC AUTOMATED: 0 PER 100 WBC
PDW BLD-RTO: 13.3 % (ref 11.8–14.4)
PLATELET # BLD: NORMAL K/UL (ref 138–453)
PLATELET, FLUORESCENCE: 123 K/UL (ref 138–453)
PLATELET, IMMATURE FRACTION: 21.5 % (ref 1.1–10.3)
PMV BLD AUTO: NORMAL FL (ref 8.1–13.5)
POTASSIUM SERPL-SCNC: 4.6 MMOL/L (ref 3.7–5.3)
RBC # BLD: 4.33 M/UL (ref 3.95–5.11)
SODIUM BLD-SCNC: 140 MMOL/L (ref 135–144)
TOTAL PROTEIN: 6.6 G/DL (ref 6.4–8.3)
TSH SERPL DL<=0.05 MIU/L-ACNC: 1.98 MIU/L (ref 0.3–5)
VITAMIN B-12: 1051 PG/ML (ref 232–1245)
WBC # BLD: 6.7 K/UL (ref 3.5–11.3)

## 2021-07-29 DIAGNOSIS — B37.49 YEAST UTI: Primary | ICD-10-CM

## 2021-07-29 LAB
CULTURE: ABNORMAL
Lab: ABNORMAL
SPECIMEN DESCRIPTION: ABNORMAL

## 2021-07-29 RX ORDER — FLUCONAZOLE 150 MG/1
150 TABLET ORAL DAILY
Qty: 10 TABLET | Refills: 0 | Status: SHIPPED | OUTPATIENT
Start: 2021-07-29 | End: 2021-08-08

## 2021-07-29 NOTE — RESULT ENCOUNTER NOTE
Platelets abnormal ( similar to previous labs though) Has she seen a hematologist re: this in the past ?  ( if not then should)   Sugar is elevated: was this fasting ? If so needs A1C and if NOT fasting then sugar is ok.    Rest of labs ok

## 2021-08-02 DIAGNOSIS — D69.3 CHRONIC ITP (IDIOPATHIC THROMBOCYTOPENIA) (HCC): ICD-10-CM

## 2021-08-02 DIAGNOSIS — R73.9 HYPERGLYCEMIA: Primary | ICD-10-CM

## 2021-08-03 ENCOUNTER — HOSPITAL ENCOUNTER (OUTPATIENT)
Age: 81
Setting detail: SPECIMEN
Discharge: HOME | End: 2021-08-03
Payer: MEDICARE

## 2021-08-03 ENCOUNTER — TELEPHONE (OUTPATIENT)
Dept: ONCOLOGY | Age: 81
End: 2021-08-03

## 2021-08-03 DIAGNOSIS — R73.9 HYPERGLYCEMIA: ICD-10-CM

## 2021-08-04 ENCOUNTER — OFFICE VISIT (OUTPATIENT)
Dept: GASTROENTEROLOGY | Age: 81
End: 2021-08-04
Payer: MEDICARE

## 2021-08-04 VITALS
WEIGHT: 153.8 LBS | SYSTOLIC BLOOD PRESSURE: 122 MMHG | TEMPERATURE: 98.6 F | BODY MASS INDEX: 31 KG/M2 | HEART RATE: 60 BPM | HEIGHT: 59 IN | OXYGEN SATURATION: 96 % | DIASTOLIC BLOOD PRESSURE: 76 MMHG

## 2021-08-04 DIAGNOSIS — K64.9 HEMORRHOIDS, UNSPECIFIED HEMORRHOID TYPE: Primary | ICD-10-CM

## 2021-08-04 DIAGNOSIS — K58.1 IRRITABLE BOWEL SYNDROME WITH CONSTIPATION: ICD-10-CM

## 2021-08-04 DIAGNOSIS — K59.00 CONSTIPATION, UNSPECIFIED CONSTIPATION TYPE: ICD-10-CM

## 2021-08-04 LAB
ESTIMATED AVERAGE GLUCOSE: 151 MG/DL
HBA1C MFR BLD: 6.9 % (ref 4–6)

## 2021-08-04 PROCEDURE — 1090F PRES/ABSN URINE INCON ASSESS: CPT | Performed by: INTERNAL MEDICINE

## 2021-08-04 PROCEDURE — 1123F ACP DISCUSS/DSCN MKR DOCD: CPT | Performed by: INTERNAL MEDICINE

## 2021-08-04 PROCEDURE — 99213 OFFICE O/P EST LOW 20 MIN: CPT | Performed by: INTERNAL MEDICINE

## 2021-08-04 PROCEDURE — G8417 CALC BMI ABV UP PARAM F/U: HCPCS | Performed by: INTERNAL MEDICINE

## 2021-08-04 PROCEDURE — 1036F TOBACCO NON-USER: CPT | Performed by: INTERNAL MEDICINE

## 2021-08-04 PROCEDURE — G8400 PT W/DXA NO RESULTS DOC: HCPCS | Performed by: INTERNAL MEDICINE

## 2021-08-04 PROCEDURE — 4040F PNEUMOC VAC/ADMIN/RCVD: CPT | Performed by: INTERNAL MEDICINE

## 2021-08-04 PROCEDURE — G8427 DOCREV CUR MEDS BY ELIG CLIN: HCPCS | Performed by: INTERNAL MEDICINE

## 2021-08-04 RX ORDER — LUBIPROSTONE 8 UG/1
8 CAPSULE, GELATIN COATED ORAL DAILY
Qty: 30 CAPSULE | Refills: 1 | Status: SHIPPED | OUTPATIENT
Start: 2021-08-04

## 2021-08-04 ASSESSMENT — ENCOUNTER SYMPTOMS
BACK PAIN: 1
VOMITING: 0
VOICE CHANGE: 0
WHEEZING: 0
SINUS PAIN: 0
COUGH: 0
RECTAL PAIN: 1
NAUSEA: 0
APNEA: 0
SINUS PRESSURE: 0
ABDOMINAL PAIN: 1
BLOOD IN STOOL: 0
CHOKING: 0
SHORTNESS OF BREATH: 0
CONSTIPATION: 1
SORE THROAT: 0
TROUBLE SWALLOWING: 0
ABDOMINAL DISTENTION: 1
ANAL BLEEDING: 0
DIARRHEA: 0

## 2021-08-04 NOTE — PROGRESS NOTES
GI OFFICE FOLLOW UP    INTERVAL HISTORY:   No referring provider defined for this encounter. Chief Complaint   Patient presents with    Follow-up     Patient is here today for a 3 month f/u on hemorroids and IBS       No diagnosis found. HISTORY OF PRESENT ILLNESS: Ms.Mary Jen Spangler is a 79 y.o. female with a past history remarkable for ,   Patient seen with a history of constipation. This patient has the symptom chronically. It is not clear whether he has a hard bowel movements. .  However she has difficulty evacuation. She had Sitzmarks study done in the past and appears to be normal study. Patient was referred to a colorectal surgeon for management of questionable hemorrhoids. The colorectal surgeon advised her not to take fiber supplements and advised her to take milk of magnesia. With this patient developed loose bowels. At present she is not able to hold bowel movements. She developed incontinence. She is frustrated with these symptoms. Denies abdominal pain. No weight loss. No bleeding. Her previous records reviewed. Past Medical,Family, and Social History reviewed and does contribute to the patient presenting condition. Patient's PMH/PSH,SH,PSYCH Hx, MEDs, ALLERGIES, and ROS were all reviewed and updated in the appropriate sections. Yes      PAST MEDICAL HISTORY:  Past Medical History:   Diagnosis Date    Age-related cognitive decline     Ankle pain     Left ankle fracture in ortho boat.    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    left with urinary incontinenc post surgical    Caffeine use     2 coffee/day, 1-2 tea per week    Chronic back pain     Chronic ITP (idiopathic thrombocytopenia) (HCC)     CVA (cerebral infarction) 2008, 2010    balance issues, short term memory loss    Diabetic gastroparesis ([facility] [address]     Diverticular disease 1986    GERD (gastroesophageal reflux disease)     Hiatal hernia     Hip fracture (HCC)     History of blood transfusion     History of decreased platelet count     Hyperlipemia     Hypertension     Internal hemorrhoid     Macular degeneration of left eye 1980's    S/P laser treatment    Nausea and vomiting     Neuropathy     Osteoarthritis     Ringing in ears     Self-catheterizes urinary bladder     6-7 times daily    TIA (transient ischemic attack) 2000    x1    Tubular adenoma     colon polyps    Type II or unspecified type diabetes mellitus without mention of complication, not stated as uncontrolled     Urinary incontinence     frequent UTI s/p infection, surgical dilatation lead to incontinence    Wears dentures     full on top, partial on bottom    Wears glasses        Past Surgical History:   Procedure Laterality Date    APPENDECTOMY  1962    BLADDER SURGERY      bladder stimulator    BLADDER SUSPENSION  2002    multiple    CARDIAC CATHETERIZATION  2008    no stents    CARDIOVASCULAR STRESS TEST  12/2014    CATARACT REMOVAL WITH IMPLANT Left 11/07/2017    [facility]    CATARACT REMOVAL WITH IMPLANT Right 11/28/2017    [facility]    COLON SURGERY      colostomy reversal    COLONOSCOPY  04/07/2016    tubular adenoma x3; internal hemorrhoids    COLONOSCOPY N/A 11/06/2019    COLONOSCOPY DIAGNOSTIC [facility]  11/06/2019    COLOSTOMY  1986    bowel obstruction/ rupture from diverticular disease, reversal 3 mos later    CYSTOSCOPY  09/08/2017    W/ 200IU Botox     FRACTURE SURGERY Right 2011    hip    FRACTURE SURGERY Left 2001    WRIST    FRACTURE SURGERY Left 2013    ankle    HERNIA REPAIR      HYSTERECTOMY  1969    JOINT REPLACEMENT Bilateral 2000    BILAT KNEES    LUMBAR FUSION  2017    L2/L3    OK XCAPSL CTRC RMVL INSJ IO LENS PROSTH W/O ECP Left 11/07/2017    EYE CATARACT EMULSIFICATION IOL IMPLANT performed by Jorge Sandoval MD at [facility] 60 RMVL INSJ IO LENS PROSTH W/O ECP Right 11/28/2017    EYE CATARACT EMULSIFICATION IOL IMPLANT performed by Jorge Sandoval MD at [facility] Right 10/27/2015    WITH POLY EXCHANGE BIOMET AND GPS APPLICATION    REVISION TOTAL KNEE ARTHROPLASTY Left 07/14/2020    KNEE TOTAL ARTHROPLASTY REVISION - POLY EXCHANGE performed by Mayra Solano MD at [address]  2010    fusion, did not take   [address]    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    NEAL AND BSO      TONSILLECTOMY  1978    UPPER GASTROINTESTINAL ENDOSCOPY      UPPER GASTROINTESTINAL ENDOSCOPY  05/05/2014    UPPER GASTROINTESTINAL ENDOSCOPY  04/07/2016    mild gastritis    UPPER GASTROINTESTINAL ENDOSCOPY N/A 07/17/2018    retained thick secretions in proximal esophagus       CURRENT MEDICATIONS:    Current Outpatient Medications:     fluconazole (DIFLUCAN) 150 MG tablet, Take 1 tablet by mouth daily for 10 doses, Disp: 10 tablet, Rfl: 0    sertraline (ZOLOFT) 50 MG tablet, Take 1 tablet by mouth daily, Disp: 30 tablet, Rfl: 5    irbesartan (AVAPRO) 75 MG tablet, TAKE 1 TABLET BY MOUTH ONE TIME A DAY , Disp: 30 tablet, Rfl: 5    lidocaine (XYLOCAINE) 2 % jelly, APPLY TOPICALLY AS NEEDED, Disp: , Rfl:     polyethylene glycol (GLYCOLAX) 17 GM/SCOOP powder, Take 17 g by mouth daily 2 tabs every morning , Disp: , Rfl:     Wheat Dextrin (BENEFIBER DRINK MIX PO), Take by mouth 2 tablespoons every night , Disp: , Rfl:     ergocalciferol (ERGOCALCIFEROL) 1.25 MG (42113 UT) capsule, Take 1 capsule by mouth once a week, Disp: , Rfl:     benzonatate (TESSALON) 200 MG capsule, Take 1 capsule by mouth as needed, Disp: , Rfl:     albuterol (PROVENTIL) (5 MG/ML) 0.5% nebulizer solution, Inhale 0.5 mLs into the lungs as needed, Disp: , Rfl:     nystatin (MYCOSTATIN) 941623 UNIT/GM powder, Apply 3 times daily. , Disp: 45 g, Rfl: 3    esomeprazole (NEXIUM) 40 MG delayed release capsule, Take 1 capsule by mouth every morning (before breakfast), Disp: 90 capsule, Rfl: 3    hydrocortisone (ANUSOL-HC) 2.5 % CREA rectal cream, Place rectally 2 times daily, Disp: 28 g, Rfl: 3    vitamin B-12 (CYANOCOBALAMIN) 1000 MCG tablet, Take 1 tablet by mouth once a week, Disp: 30 tablet, Rfl: 3    aspirin 81 MG EC tablet, Take 1 tablet by mouth 2 times daily, Disp: 30 tablet, Rfl: 3    nystatin-triamcinolone (MYCOLOG II) 757499-9.1 UNIT/GM-% cream, Apply topically 4 times daily Apply topically 4 times daily. , Disp: 30 g, Rfl: 3    rOPINIRole (REQUIP) 1 MG tablet, Take by mouth nightly , Disp: , Rfl:     glipiZIDE (GLUCOTROL) 5 MG tablet, 2.5mg in the am and 2.5 mg in the pm, Disp: , Rfl:     Continuous Blood Gluc Sensor (FREESTYLE DAVIS 14 DAY SENSOR) MISC, , Disp: , Rfl:     Salicylic Acid (COMPOUND W EX), , Disp: , Rfl:     LIVALO 2 MG TABS tablet, Take 2 mg by mouth nightly , Disp: , Rfl:     Multiple Vitamins-Minerals (THERAPEUTIC MULTIVITAMIN-MINERALS) tablet, Take 1 tablet by mouth daily, Disp: , Rfl:     Multiple Vitamins-Minerals (PRESERVISION AREDS PO), Take by mouth daily , Disp: , Rfl:     Continuous Blood Gluc  (FREESTYLE DAVIS 14 DAY READER) MATTHEW, , Disp: , Rfl:     donepezil (ARICEPT) 10 MG tablet, Take 10 mg by mouth nightly , Disp: , Rfl:     estradiol (ESTRACE VAGINAL) 0.1 MG/GM vaginal cream, Place 1 g vaginally Twice a Week (Patient taking differently: Place 1 g vaginally every 14 days ), Disp: 1 Tube, Rfl: 5    CRANBERRY PO, Take by mouth 2 times daily, Disp: , Rfl:     FARXIGA 10 MG tablet, TAKE 1 TABLET BY MOUTH IN THE MORNING  (Patient not taking: Reported on 7/27/2021), Disp: 30 tablet, Rfl: 11    ALLERGIES:   Allergies   Allergen Reactions    Iodides Anaphylaxis, Hives and Itching     \"Contrast dyes\"  This was added by someone but Patient states has had IVP dyes multiple times without problems and had a myelogram in Dec 2016 without problems    Povidone-Iodine Anaphylaxis, Hives and Swelling    Sulfa Antibiotics Hives and Swelling     Other reaction(s): Unknown    Betadine [Povidone Iodine] Hives    Cephalexin Hives and Swelling    Cephalexin Itching     Other reaction(s): Hallucinations  In  received demerol and keflex together so was told to list both as allergies    Cozaar [Losartan] Nausea Only     Pt also gets sores on toungue    Cymbalta [Duloxetine Hcl] Diarrhea and Nausea And Vomiting     Weakness    Demerol Hives and Swelling    Doxycycline Other (See Comments)     Sore mouth        Meperidine Itching     Other reaction(s): Hallucinations      Morphine Hives and Itching    Penicillins Hives, Swelling and Itching     Other reaction(s): Intolerance-unknown  Other reaction(s): Unknown    Zithromax [Azithromycin] Other (See Comments)     Sore mouth      Clindamycin/Lincomycin     Etodolac     Fluorescein     Iodine      Other reaction(s):  Intolerance-unknown    Lisinopril      cough    Penicillin G     Soap     Toviaz [Fesoterodine Fumarate Er]     Metformin And Related Nausea And Vomiting     Diarrhea and N/V       FAMILY HISTORY:       Problem Relation Age of Onset    Breast Cancer Mother     Cancer Mother         breast and uterine  39    Heart Disease Father     Heart Attack Father          28   [address] [facility] Maternal Grandmother     Cancer Brother 46        bronchogenic adenocarcinoma cancer    Lung Cancer Brother     Cancer Sister         lung    Lung Cancer Sister          72    Breast Cancer Sister          62    Cancer Sister         breast         SOCIAL HISTORY:   Social History     Socioeconomic History    Marital status:      Spouse name: Not on file    Number of children: Not on file    Years of education: Not on file    Highest education level: Not on file   Occupational History    Occupation: retired   Tobacco Use    Smoking status: Former Smoker     Packs/day: 0.50     Years: 20.00     Pack years: 10.00     Quit date: 1982     Years since quittin.2    Smokeless tobacco: Former User     Quit date: 1982   Vaping Use    Vaping Use: Never used   Substance and Sexual Activity    Alcohol use: No    Drug use: No    Sexual activity: Not on file   Other Topics Concern    Not on file   Social History Narrative    Not on file     Social Determinants of Health     Financial Resource Strain: Low Risk     Difficulty of Paying Living Expenses: Not very hard   Food Insecurity: No Food Insecurity    Worried About Running Out of Food in the Last Year: Never true    Nicko of Food in the Last Year: Never true   Transportation Needs: No Transportation Needs    Lack of Transportation (Medical): No    Lack of Transportation (Non-Medical): No   Physical Activity:     Days of Exercise per Week:     Minutes of Exercise per Session:    Stress:     Feeling of Stress :    Social Connections:     Frequency of Communication with Friends and Family:     Frequency of Social Gatherings with Friends and Family:     Attends Anglican Services:     Active Member of Clubs or Organizations:     Attends Club or Organization Meetings:     Marital Status:    Intimate Partner Violence:     Fear of Current or Ex-Partner:     Emotionally Abused:     Physically Abused:     Sexually Abused:          REVIEW OF SYSTEMS:         Review of Systems   Constitutional: Positive for fatigue. Negative for appetite change and unexpected weight change. HENT: Negative for postnasal drip, sinus pressure, sinus pain, sore throat, trouble swallowing and voice change. Eyes: Positive for visual disturbance (Glasses). Respiratory: Negative for apnea, cough, choking, shortness of breath and wheezing. Cardiovascular: Negative for chest pain and leg swelling. Gastrointestinal: Positive for abdominal distention, abdominal pain, constipation and rectal pain. Negative for anal bleeding, blood in stool, diarrhea, nausea and vomiting. Genitourinary: Negative for difficulty urinating. Musculoskeletal: Positive for arthralgias, back pain (lower), gait problem (cane) and myalgias. Neurological: Negative for dizziness, weakness, light-headedness, numbness and headaches. Hematological: Bruises/bleeds easily. Psychiatric/Behavioral: Negative for sleep disturbance. The patient is nervous/anxious. PHYSICAL EXAMINATION:     Vital signs reviewed per the nursing documentation. There were no vitals taken for this visit. There is no height or weight on file to calculate BMI. Physical Exam  Vitals and nursing note reviewed. Constitutional:       Appearance: She is well-developed. HENT:      Head: Normocephalic and atraumatic. Eyes:      General: No scleral icterus. Conjunctiva/sclera: Conjunctivae normal.      Pupils: Pupils are equal, round, and reactive to light. Neck:      Thyroid: No thyromegaly. Vascular: No hepatojugular reflux or JVD. Trachea: No tracheal deviation. Cardiovascular:      Rate and Rhythm: Normal rate and regular rhythm. Heart sounds: Normal heart sounds. Pulmonary:      Effort: Pulmonary effort is normal. No respiratory distress. Breath sounds: Normal breath sounds. No wheezing or rales. Abdominal:      General: Bowel sounds are normal. There is no distension. Palpations: Abdomen is soft. There is no hepatomegaly or mass. Tenderness: There is no abdominal tenderness. There is no rebound. Hernia: No hernia is present. Musculoskeletal:         General: No tenderness. Cervical back: Normal range of motion and neck supple. Comments: No joint swelling   Lymphadenopathy:      Cervical: No cervical adenopathy. Skin:     General: Skin is warm.       Findings: No bruising, ecchymosis, erythema or rash. Neurological:      Mental Status: She is alert and oriented to person, place, and time. Cranial Nerves: No cranial nerve deficit. Psychiatric:         Thought Content: Thought content normal.           LABORATORY DATA: Reviewed  Lab Results   Component Value Date    WBC 6.7 07/28/2021    HGB 12.9 07/28/2021    HCT 41.9 07/28/2021    MCV 96.8 07/28/2021    PLT See Reflexed IPF Result 07/28/2021     07/28/2021    K 4.6 07/28/2021     07/28/2021    CO2 20 07/28/2021    BUN 22 07/28/2021    CREATININE 0.70 07/28/2021    LABPROT 6.8 11/19/2012    LABALBU 4.1 07/28/2021    BILITOT 0.58 07/28/2021    ALKPHOS 70 07/28/2021    AST 17 07/28/2021    ALT 18 07/28/2021    INR 1.0 02/21/2017         Lab Results   Component Value Date    RBC 4.33 07/28/2021    HGB 12.9 07/28/2021    MCV 96.8 07/28/2021    MCH 29.8 07/28/2021    MCHC 30.8 07/28/2021    RDW 13.3 07/28/2021    MPV NOT REPORTED 07/28/2021    BASOPCT 1 06/30/2020    LYMPHSABS 1.22 06/30/2020    MONOSABS 0.46 06/30/2020    NEUTROABS 3.17 06/30/2020    EOSABS 0.20 06/30/2020    BASOSABS 0.05 06/30/2020         DIAGNOSTIC TESTING:     No results found. Assessment  No diagnosis found. Plan  Discussed with the patient to hold off milk of magnesia as it is causing loose bowels. Discussed with her the importance of fiber supplements. Advised to take Amitiza if she has constipation. Discussed regarding bowel habit regularization. Advised not to take milk of magnesia unless she has severe constipation. We will reevaluate her in the next 4 to 6 weeks. In between if she has any concerns or worsening of the symptoms to contact us. Thank you for allowing me to participate in the care of Ms. Jovanni Hull. For any further questions please do not hesitate to contact me. I have reviewed and agree with the ROS entered by the MA/LPN. Note is dictated utilizing voice recognition software.  Unfortunately this leads to occasional typographical errors.  Please contact our office if you have any questions        Aleksandr Mcdaniel MD,FACP, [facility]  Board Certified in Gastroenterology and [address] [place] [address] Gastroenterology  Office #: [phone]

## 2021-08-18 DIAGNOSIS — R21 RASH: ICD-10-CM

## 2021-08-18 NOTE — TELEPHONE ENCOUNTER
----- Message from Tan Nugent sent at 8/17/2021  5:18 PM EDT -----  Subject: Refill Request    QUESTIONS  Name of Medication? nystatin-triamcinolone (MYCOLOG II) 438412-7.4   UNIT/GM-% cream  Patient-reported dosage and instructions? apply to affected area 4 times a   day  How many days do you have left? 0  Preferred Pharmacy? [facility] [id number]  Pharmacy phone number (if available)? [phone]  Additional Information for Provider? Patient is requesting a refill of   this medication. She said she is breaking out on her stomach with the heat   and humidity.   ---------------------------------------------------------------------------  --------------  CALL BACK INFO  What is the best way for the office to contact you? OK to leave message on   voicemail  Preferred Call Back Phone Number?  [phone]

## 2021-08-19 ENCOUNTER — OFFICE VISIT (OUTPATIENT)
Dept: PRIMARY CARE CLINIC | Age: 81
End: 2021-08-19
Payer: MEDICARE

## 2021-08-19 VITALS
OXYGEN SATURATION: 98 % | WEIGHT: 154.4 LBS | DIASTOLIC BLOOD PRESSURE: 70 MMHG | HEIGHT: 59 IN | BODY MASS INDEX: 31.12 KG/M2 | HEART RATE: 66 BPM | SYSTOLIC BLOOD PRESSURE: 128 MMHG

## 2021-08-19 DIAGNOSIS — F33.40 RECURRENT MAJOR DEPRESSIVE DISORDER, IN REMISSION (HCC): ICD-10-CM

## 2021-08-19 DIAGNOSIS — R25.1 TREMOR OF RIGHT HAND: Primary | ICD-10-CM

## 2021-08-19 DIAGNOSIS — Z12.31 BREAST CANCER SCREENING BY MAMMOGRAM: ICD-10-CM

## 2021-08-19 DIAGNOSIS — R25.2 SPASM: ICD-10-CM

## 2021-08-19 PROCEDURE — G8417 CALC BMI ABV UP PARAM F/U: HCPCS | Performed by: FAMILY MEDICINE

## 2021-08-19 PROCEDURE — 1090F PRES/ABSN URINE INCON ASSESS: CPT | Performed by: FAMILY MEDICINE

## 2021-08-19 PROCEDURE — G8400 PT W/DXA NO RESULTS DOC: HCPCS | Performed by: FAMILY MEDICINE

## 2021-08-19 PROCEDURE — 1036F TOBACCO NON-USER: CPT | Performed by: FAMILY MEDICINE

## 2021-08-19 PROCEDURE — 1123F ACP DISCUSS/DSCN MKR DOCD: CPT | Performed by: FAMILY MEDICINE

## 2021-08-19 PROCEDURE — 99214 OFFICE O/P EST MOD 30 MIN: CPT | Performed by: FAMILY MEDICINE

## 2021-08-19 PROCEDURE — 4040F PNEUMOC VAC/ADMIN/RCVD: CPT | Performed by: FAMILY MEDICINE

## 2021-08-19 PROCEDURE — G8427 DOCREV CUR MEDS BY ELIG CLIN: HCPCS | Performed by: FAMILY MEDICINE

## 2021-08-19 RX ORDER — CLONAZEPAM 0.5 MG/1
0.5 TABLET ORAL 2 TIMES DAILY
COMMUNITY
Start: 2021-08-10 | End: 2021-08-19 | Stop reason: SINTOL

## 2021-08-19 ASSESSMENT — ENCOUNTER SYMPTOMS: RESPIRATORY NEGATIVE: 1

## 2021-08-19 NOTE — PATIENT INSTRUCTIONS
Patient Education        Benign Essential Tremor: Care Instructions  Your Care Instructions     Benign essential tremor is a medical term for shaking that you can't control. Your hand or fingers may shake when you lift a cup or point at something. Or your voice may shake when you speak. This type of tremor is not harmful. It is not caused by a stroke or Parkinson's disease. Some things can affect how much you shake. For example, drinking or eating something with caffeine may make tremors worse for a while. Some medicines also can increase tremors. These include antidepressants and too much thyroid replacement. Talk to your doctor if you think one of your medicines makes your tremors worse. If you are self-conscious about your tremors, there are some things you can do to reduce them or make them less noticeable. This includes taking medicine. Follow-up care is a key part of your treatment and safety. Be sure to make and go to all appointments, and call your doctor if you are having problems. It's also a good idea to know your test results and keep a list of the medicines you take. How can you care for yourself at home? · Take your medicines exactly as prescribed. Call your doctor if you think you are having a problem with your medicine. Some medicines that help control tremors have to be taken every day, even if you are not having tremors. You will get more details on the specific medicines your doctor prescribes. · Get plenty of rest.  · Eat a balanced, healthy diet. · Try to reduce stress. Regular exercise and massages may help. · Limit alcohol. Heavy drinking can make your tremors worse. · Avoid drinks or foods with caffeine if they make your tremors worse. These include tea, cola, coffee, and chocolate. · Wear a heavy bracelet or watch. This adds a little weight to your hand. The extra weight may reduce tremors. · Drink from cups or glasses that are only half full.  You may also want to try drinking with a straw. When should you call for help? Watch closely for changes in your health, and be sure to contact your doctor if:    · You notice your tremors are getting worse.     · You can't do your everyday activities because of your tremors.     · You are sad and embarrassed about your shaking. Where can you learn more? Go to [email or web address] org and sign in to your [facility] account. Enter B746 in the [employer] box to learn more about \"Benign Essential Tremor: Care Instructions. \"     If you do not have an account, please click on the \"Sign Up Now\" link. Current as of: August 4, 2020               Content Version: 12.9  © 2006-2021 [employer]wise, Inc[employer]. Care instructions adapted under license by [facility] ([facility]). If you have questions about a medical condition or this instruction, always ask your healthcare professional. [address] any warranty or liability for your use of this information.

## 2021-08-19 NOTE — PROGRESS NOTES
[address] [facility] PRIMARY CARE  [address] [facility] [address]  Dept: 10 Kristian Gardiner is a [de-identified] y.o. female Established patient, who presents today for her medical conditions/complaintsas noted below. Chief Complaint   Patient presents with    Other     Shakiness for over 3 weeks ,worsening slowly over time, sometimes gets numbness in right hand.  Headache       HPI:     HPI   tremors: mostly on right side x 3 weeks, worse this week,  her neurologist gave her clonazepam  And 1 pill made her sleep for 1.5 days. Sees urologist UTI doing better. Sees GI doing better    Reviewed prior notes None  Reviewed previous Labs    LDL Cholesterol (mg/dL)   Date Value   07/06/2020 85   10/17/2018 137 (H)   07/16/2018 64     LDL Calculated (mg/dL)   Date Value   03/12/2015 47   08/18/2014 39       (goal LDL is <100)   AST (U/L)   Date Value   07/28/2021 17     ALT (U/L)   Date Value   07/28/2021 18     BUN (mg/dL)   Date Value   07/28/2021 22     Hemoglobin A1C (%)   Date Value   08/03/2021 6.9 (H)     TSH (mIU/L)   Date Value   07/28/2021 1.98     BP Readings from Last 3 Encounters:   08/19/21 128/70   08/04/21 122/76   07/27/21 120/70          (goal 120/80)    Past Medical History:   Diagnosis Date    Age-related cognitive decline     Ankle pain     Left ankle fracture in ortho boat.    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    left with urinary incontinenc post surgical    Caffeine use     2 coffee/day, 1-2 tea per week    Chronic back pain     Chronic ITP (idiopathic thrombocytopenia) (HCC)     CVA (cerebral infarction) 2008, 2010    balance issues, short term memory loss    Diabetic gastroparesis ([address]     Diverticular disease 1986    GERD (gastroesophageal reflux disease)     Hiatal hernia     Hip fracture ([address]     History of blood transfusion     History of decreased platelet count     Hyperlipemia     Hypertension     Internal hemorrhoid     Macular degeneration of left eye 1980's    S/P laser treatment    Nausea and vomiting     Neuropathy     Osteoarthritis     Ringing in ears     Self-catheterizes urinary bladder     6-7 times daily    TIA (transient ischemic attack) 2000    x1    Tubular adenoma     colon polyps    Type II or unspecified type diabetes mellitus without mention of complication, not stated as uncontrolled     Urinary incontinence     frequent UTI s/p infection, surgical dilatation lead to incontinence    Wears dentures     full on top, partial on bottom    Wears glasses       Past Surgical History:   Procedure Laterality Date    APPENDECTOMY  1962    BLADDER SURGERY      bladder stimulator    BLADDER SUSPENSION  2002    multiple    CARDIAC CATHETERIZATION  2008    no stents    CARDIOVASCULAR STRESS TEST  12/2014    CATARACT REMOVAL WITH IMPLANT Left 11/07/2017    [facility]    CATARACT REMOVAL WITH IMPLANT Right 11/28/2017    [facility]    COLON SURGERY      colostomy reversal    COLONOSCOPY  04/07/2016    tubular adenoma x3; internal hemorrhoids    COLONOSCOPY N/A 11/06/2019    COLONOSCOPY DIAGNOSTIC performed by Saadia Nathan MD at [address] [place] [address]  11/06/2019    COLOSTOMY  1986    bowel obstruction/ rupture from diverticular disease, reversal 3 mos later    CYSTOSCOPY  09/08/2017    W/ 200IU Botox     FRACTURE SURGERY Right 2011    hip    FRACTURE SURGERY Left 2001    WRIST    FRACTURE SURGERY Left 2013    ankle    HERNIA REPAIR      HYSTERECTOMY  1969    JOINT REPLACEMENT Bilateral 2000    BILAT KNEES    LUMBAR FUSION  2017    L2/L3    OK XCAPSL CTRC RMVL INSJ IO LENS PROSTH W/O ECP Left 11/07/2017    EYE CATARACT EMULSIFICATION IOL IMPLANT performed by Lucas Guzman MD at [facility] 60 RMVL INSJ IO LENS PROSTH W/O ECP Right 11/28/2017    EYE CATARACT EMULSIFICATION IOL IMPLANT performed by Shabana Nguyen MD at [facility] Right 10/27/2015    WITH POLY EXCHANGE BIOMET AND GPS APPLICATION    REVISION TOTAL KNEE ARTHROPLASTY Left 2020    KNEE TOTAL ARTHROPLASTY REVISION - POLY EXCHANGE performed by Marta Brennan MD at [address] [facility]      fusion, did not take   [address]    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    NEAL AND BSO      TONSILLECTOMY  1978    UPPER GASTROINTESTINAL ENDOSCOPY      UPPER GASTROINTESTINAL ENDOSCOPY  2014    UPPER GASTROINTESTINAL ENDOSCOPY  2016    mild gastritis    UPPER GASTROINTESTINAL ENDOSCOPY N/A 2018    retained thick secretions in proximal esophagus       Family History   Problem Relation Age of Onset    Breast Cancer Mother     Cancer Mother         breast and uterine  39    Heart Disease Father     Heart Attack Father          28   [address] [facility] Maternal Grandmother     Cancer Brother 46        bronchogenic adenocarcinoma cancer    Lung Cancer Brother     Cancer Sister         lung    Lung Cancer Sister          72    Breast Cancer Sister          62    Cancer Sister         breast       Social History     Tobacco Use    Smoking status: Former Smoker     Packs/day: 0.50     Years: 20.00     Pack years: 10.00     Quit date: 1982     Years since quittin.2    Smokeless tobacco: Former User     Quit date: 1982   Substance Use Topics    Alcohol use: No      Current Outpatient Medications   Medication Sig Dispense Refill    nystatin-triamcinolone (MYCOLOG II) 877539-9.1 UNIT/GM-% cream Apply topically 4 times daily Apply topically 4 times daily.  30 g 3    sertraline (ZOLOFT) 50 MG tablet 1/2 tab daily x 2 weeks then off for possible tremors 30 tablet 5    lubiprostone (AMITIZA) 8 MCG CAPS capsule Take 1 capsule by mouth daily 30 capsule 1    nitrofurantoin, macrocrystal-monohydrate, (MACROBID) 100 MG capsule Take 1 capsule by mouth daily 30 capsule 11    irbesartan (AVAPRO) 75 MG tablet TAKE 1 TABLET BY MOUTH ONE TIME A DAY  30 tablet 5    lidocaine (XYLOCAINE) 2 % jelly APPLY TOPICALLY AS NEEDED      Wheat Dextrin (BENEFIBER DRINK MIX PO) Take by mouth 2 tablespoons every night       benzonatate (TESSALON) 200 MG capsule Take 1 capsule by mouth as needed      albuterol (PROVENTIL) (5 MG/ML) 0.5% nebulizer solution Inhale 0.5 mLs into the lungs as needed      nystatin (MYCOSTATIN) 616152 UNIT/GM powder Apply 3 times daily. 45 g 3    esomeprazole (NEXIUM) 40 MG delayed release capsule Take 1 capsule by mouth every morning (before breakfast) 90 capsule 3    hydrocortisone (ANUSOL-HC) 2.5 % CREA rectal cream Place rectally 2 times daily 28 g 3    aspirin 81 MG EC tablet Take 1 tablet by mouth 2 times daily 30 tablet 3    rOPINIRole (REQUIP) 1 MG tablet Take by mouth nightly       glipiZIDE (GLUCOTROL) 5 MG tablet 2.5mg in the am and 2.5 mg in the pm      Continuous Blood Gluc Sensor (FREESTYLE DAVIS 14 DAY SENSOR) MISC       Salicylic Acid (COMPOUND W EX)       LIVALO 2 MG TABS tablet Take 2 mg by mouth nightly       Multiple Vitamins-Minerals (THERAPEUTIC MULTIVITAMIN-MINERALS) tablet Take 1 tablet by mouth daily      Multiple Vitamins-Minerals (PRESERVISION AREDS PO) Take by mouth daily       Continuous Blood Gluc  (FREESTYLE DAVIS 14 DAY READER) MATTHEW       donepezil (ARICEPT) 10 MG tablet Take 10 mg by mouth nightly       estradiol (ESTRACE VAGINAL) 0.1 MG/GM vaginal cream Place 1 g vaginally Twice a Week (Patient taking differently: Place 1 g vaginally every 14 days ) 1 Tube 5    CRANBERRY PO Take by mouth 2 times daily      vitamin B-12 (CYANOCOBALAMIN) 1000 MCG tablet Take 1 tablet by mouth once a week 30 tablet 3     No current facility-administered medications for this visit.      Facility-Administered Medications Ordered in Other Visits   Medication Dose Route Frequency Provider Last Rate Last Admin    0.9 % sodium chloride infusion 250 mL  250 mL Intravenous Once Marleny Quezada MD         Allergies   Allergen Reactions    Iodides Anaphylaxis, Hives and Itching     \"Contrast dyes\"  This was added by someone but Patient states has had IVP dyes multiple times without problems and had a myelogram in Dec 2016 without problems    Povidone-Iodine Anaphylaxis, Hives and Swelling    Sulfa Antibiotics Hives and Swelling     Other reaction(s): Unknown    Betadine [Povidone Iodine] Hives    Cephalexin Hives and Swelling    Cephalexin Itching     Other reaction(s): Hallucinations  In 1969 received demerol and keflex together so was told to list both as allergies    Cozaar [Losartan] Nausea Only     Pt also gets sores on toungue    Cymbalta [Duloxetine Hcl] Diarrhea and Nausea And Vomiting     Weakness    Demerol Hives and Swelling    Doxycycline Other (See Comments)     Sore mouth        Meperidine Itching     Other reaction(s): Hallucinations      Morphine Hives and Itching    Penicillins Hives, Swelling and Itching     Other reaction(s): Intolerance-unknown  Other reaction(s): Unknown    Zithromax [Azithromycin] Other (See Comments)     Sore mouth      Clindamycin/Lincomycin     Etodolac     Fluorescein     Iodine      Other reaction(s):  Intolerance-unknown    Lisinopril      cough    Penicillin G     Soap     Toviaz [Fesoterodine Fumarate Er]     Clonazepam Other (See Comments)     From neuro: made her too sleepy    Metformin And Related Nausea And Vomiting     Diarrhea and N/V       Health Maintenance   Topic Date Due    Annual Wellness Visit (AWV)  Never done    Lipid screen  07/06/2021    DTaP/Tdap/Td vaccine (1 - Tdap) 01/09/2022 (Originally 8/12/1959)    Flu vaccine (1) 09/01/2021    Potassium monitoring  07/28/2022    Creatinine monitoring  07/28/2022    Shingles Vaccine  Completed    Pneumococcal 65+ years Vaccine  Completed    COVID-19 Vaccine Completed    DEXA (modify frequency per FRAX score)  Addressed    Hepatitis A vaccine  Aged Out    Hib vaccine  Aged Out    Meningococcal (ACWY) vaccine  Aged Out       Subjective:      Review of Systems   Constitutional: Negative for chills and fever. Respiratory: Negative. Cardiovascular: Negative. Neurological: Positive for tremors and headaches. Headaches: tylenol helps somewhat,   Daily. Has vision issues  Tremors seem worse with action: ie carrying cup   numbness in right hand when sitting    Objective:     /70   Pulse 66   Ht 4' 11\" (1.499 m)   Wt 154 lb 6.4 oz (70 kg)   SpO2 98%   BMI 31.19 kg/m²   Physical Exam  Vitals and nursing note reviewed. Constitutional:       General: She is not in acute distress. Appearance: She is well-developed. She is not ill-appearing. HENT:      Head: Normocephalic and atraumatic. Right Ear: External ear normal.      Left Ear: External ear normal.   Eyes:      General: No scleral icterus. Right eye: No discharge. Left eye: No discharge. Conjunctiva/sclera: Conjunctivae normal.      Pupils: Pupils are equal, round, and reactive to light. Neck:      Thyroid: No thyromegaly. Trachea: No tracheal deviation. Cardiovascular:      Rate and Rhythm: Normal rate and regular rhythm. Heart sounds: Normal heart sounds. Pulmonary:      Effort: Pulmonary effort is normal. No respiratory distress. Breath sounds: Normal breath sounds. No wheezing. Musculoskeletal:      Comments: Right arm has shaking frequently  Worse when doing gripping test.      Lymphadenopathy:      Cervical: No cervical adenopathy. Skin:     General: Skin is warm. Findings: No rash. Neurological:      Mental Status: She is alert and oriented to person, place, and time. Deep Tendon Reflexes:      Reflex Scores:       Bicep reflexes are 1+ on the right side and 1+ on the left side.        Brachioradialis reflexes are 1+ on the right side and 0 on the left side. Comments:  strength 3-4 over 5 bilaterally. Psychiatric:         Mood and Affect: Mood normal.         Behavior: Behavior normal.         Thought Content: Thought content normal.         Assessment:       Diagnosis Orders   1. Tremor of right hand  XR CERVICAL SPINE (2-3 VIEWS)   2. Recurrent major depressive disorder, in remission (HCC)  sertraline (ZOLOFT) 50 MG tablet   3. Spasm  XR CERVICAL SPINE (2-3 VIEWS)   4. Breast cancer screening by mammogram  XANDER DIGITAL SCREEN W OR WO CAD BILATERAL        Plan:      Return in about 5 weeks (around 2021). Try weaning off zoloft  If tremors not better continue testing, check neck xray for now. Orders Placed This Encounter   Procedures    XR CERVICAL SPINE (2-3 VIEWS)     Standing Status:   Future     Standing Expiration Date:   2022    XANDER DIGITAL SCREEN W OR WO CAD BILATERAL     Standing Status:   Future     Standing Expiration Date:   2022     Scheduling Instructions:      Dx Code  Z12.31     Order Specific Question:   Reason for exam:     Answer:   screening     Orders Placed This Encounter   Medications    sertraline (ZOLOFT) 50 MG tablet     Si/2 tab daily x 2 weeks then off for possible tremors     Dispense:  30 tablet     Refill:  5       Patient given educationalmaterials - see patient instructions. Discussed use, benefit, and side effectsof prescribed medications. All patient questions answered. Pt voiced understanding. Reviewed health maintenance. Instructed to continue current medications, diet  Patient agreed with treatment plan. Follow up as directed.      Electronicallysigned by Joselin England MD on 2021 at 3:29 PM

## 2021-08-20 ENCOUNTER — TELEPHONE (OUTPATIENT)
Dept: ONCOLOGY | Age: 81
End: 2021-08-20

## 2021-08-20 ENCOUNTER — HOSPITAL ENCOUNTER (OUTPATIENT)
Dept: GENERAL RADIOLOGY | Age: 81
Discharge: HOME | End: 2021-08-22
Payer: MEDICARE

## 2021-08-20 ENCOUNTER — HOSPITAL ENCOUNTER (OUTPATIENT)
Age: 81
Discharge: HOME | End: 2021-08-22
Payer: MEDICARE

## 2021-08-20 ENCOUNTER — INITIAL CONSULT (OUTPATIENT)
Dept: ONCOLOGY | Age: 81
End: 2021-08-20
Payer: MEDICARE

## 2021-08-20 VITALS
BODY MASS INDEX: 31.37 KG/M2 | TEMPERATURE: 97.9 F | HEART RATE: 67 BPM | HEIGHT: 59 IN | SYSTOLIC BLOOD PRESSURE: 133 MMHG | WEIGHT: 155.6 LBS | DIASTOLIC BLOOD PRESSURE: 86 MMHG | RESPIRATION RATE: 18 BRPM

## 2021-08-20 DIAGNOSIS — R25.1 TREMOR OF RIGHT HAND: ICD-10-CM

## 2021-08-20 DIAGNOSIS — D69.6 THROMBOCYTOPENIA (HCC): Primary | ICD-10-CM

## 2021-08-20 DIAGNOSIS — R25.2 SPASM: ICD-10-CM

## 2021-08-20 PROCEDURE — G8427 DOCREV CUR MEDS BY ELIG CLIN: HCPCS | Performed by: INTERNAL MEDICINE

## 2021-08-20 PROCEDURE — 99204 OFFICE O/P NEW MOD 45 MIN: CPT | Performed by: INTERNAL MEDICINE

## 2021-08-20 PROCEDURE — 72040 X-RAY EXAM NECK SPINE 2-3 VW: CPT

## 2021-08-20 PROCEDURE — 1090F PRES/ABSN URINE INCON ASSESS: CPT | Performed by: INTERNAL MEDICINE

## 2021-08-20 PROCEDURE — 99202 OFFICE O/P NEW SF 15 MIN: CPT | Performed by: INTERNAL MEDICINE

## 2021-08-20 PROCEDURE — G8417 CALC BMI ABV UP PARAM F/U: HCPCS | Performed by: INTERNAL MEDICINE

## 2021-08-20 NOTE — PROGRESS NOTES
Spine surgery (1990); Upper gastrointestinal endoscopy; Bladder surgery; Upper gastrointestinal endoscopy (05/05/2014); cardiovascular stress test (12/2014); Colon surgery; eunice and bso (cervix removed); fracture surgery (Right, 2011); fracture surgery (Left, 2001); fracture surgery (Left, 2013); Cardiac catheterization (2008); Revision total knee arthroplasty (Right, 10/27/2015); Colonoscopy (04/07/2016); Upper gastrointestinal endoscopy (04/07/2016); lumbar fusion (2017); Cystocopy (09/08/2017); Cataract removal with implant (Left, 11/07/2017); pr xcapsl ctrc rmvl insj io lens prosth w/o ecp (Left, 11/07/2017); Cataract removal with implant (Right, 11/28/2017); pr xcapsl ctrc rmvl insj io lens prosth w/o ecp (Right, 11/28/2017); Upper gastrointestinal endoscopy (N/A, 07/17/2018); joint replacement (Bilateral, 2000); hernia repair; Colonoscopy (N/A, 11/06/2019); Revision total knee arthroplasty (Left, 07/14/2020); and Colonoscopy (11/06/2019). CURRENT MEDICATIONS:  has a current medication list which includes the following prescription(s): nystatin-triamcinolone, sertraline, lubiprostone, nitrofurantoin (macrocrystal-monohydrate), irbesartan, lidocaine, wheat dextrin, benzonatate, albuterol, nystatin, esomeprazole, hydrocortisone, vitamin b-12, aspirin, ropinirole, glipizide, freestyle paola 14 day sensor, livalo, therapeutic multivitamin-minerals, multiple vitamins-minerals, freestyle paola 14 day reader, donepezil, estradiol, and cranberry, and the following Facility-Administered Medications: 0.9 % sodium chloride.     ALLERGIES:  is allergic to iodides, povidone-iodine, sulfa antibiotics, betadine [povidone iodine], cephalexin, cephalexin, cozaar [losartan], cymbalta [duloxetine hcl], demerol, doxycycline, meperidine, morphine, penicillins, zithromax [azithromycin], clindamycin/lincomycin, etodolac, fluorescein, iodine, lisinopril, penicillin g, soap, toviaz [fesoterodine fumarate er], clonazepam, and metformin and related. FAMILY HISTORY: Multiple family numbers with cancer including mother who had breast cancer at young age. Otherwise negative for any hematological or oncological conditions. SOCIAL HISTORY:  reports that she quit smoking about 39 years ago. She has a 10.00 pack-year smoking history. She quit smokeless tobacco use about 39 years ago. She reports that she does not drink alcohol and does not use drugs. REVIEW OF SYSTEMS:     · General: No weakness or fatigue. No unanticipated weight loss or decreased appetite. No fever or chills. · Eyes: No blurred vision, eye pain or double vision. · Ears: No hearing problems or drainage. No tinnitus. · Throat: No sore throat, problems with swallowing or dysphagia. · Respiratory: No cough, sputum or hemoptysis. No shortness of breath. No pleuritic chest pain. · Cardiovascular: No chest pain, orthopnea or PND. No lower extremity edema. No palpitation. · Gastrointestinal: No problems with swallowing. No abdominal pain or bloating. No nausea or vomiting. No diarrhea or constipation. No GI bleeding. · Genitourinary: No dysuria, hematuria, frequency or urgency. · Musculoskeletal: No muscle aches or pains. No limitation of movement. No back pain. No gait disturbance, No joint complaints. · Dermatologic: No skin rashes or pruritus. No skin lesions or discolorations. · Psychiatric: No depression, anxiety, or stress or signs of schizophrenia. No change in mood or affect. · Hematologic: No history of bleeding tendency. No bruises or ecchymosis. No history of clotting problems. · Infectious disease: No fever, chills or frequent infections. · Endocrine: No polydipsia or polyuria. No temperature intolerance. · Neurologic: No headaches or dizziness. No weakness or numbness of the extremities. No changes in balance, coordination,  memory, mentation, behavior. · Allergic/Immunologic: No nasal congestion or hives. No repeated infections. thrombocytopenia. 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 PLAN: I reviewed the labs available to me and discussed with the patient. For more than 60 minutes of face to face discussion, I explained to the patient the nature of this hematologic problem. I explained the significance of these abnormalities in layman language. Patient is having chronic thrombocytopenia for so many years. More than 15 years. Platelets were above 100 most of the time. Clinically no symptoms related to thrombocytopenia. Never needed treatment. Patient was evaluated by hematology in the past.  She had diagnosis of chronic ITP. At this juncture with platelets being stable above 100 with no symptoms I do not believe there is a need for treatment but rather monitoring and observation. I explained to the patient that platelets need to be more than 90 for surgical procedures and above therapy for daily life. Treatment will be considered if they drop below these levels. Otherwise continued observation with CBC every 6 months would be quite reasonable. I plan to see the patient once a year. Sooner for any problems. Patient's questions were answered to the best of her satisfaction and she verbalized full understanding and agreement.

## 2021-08-20 NOTE — TELEPHONE ENCOUNTER
AVS from 8/20/21     CBC q 6 months  RV one year       *rv is sched for [email or web address] done in Feb 22    PT was given AVS,orders and an appt schedule

## 2021-08-20 NOTE — LETTER
_    Contreras Greenwood MD    8/20/2021     Calixto Melara MD   [phone] [facility]    Dear Dr Marlyn Aguilar: Thank you for referring Ahsleigh Aguilar, 1940, to me for evaluation. Below are the relevant portions of my assessment and plan of care. Ms. Ashleigh Aguilar is a very pleasant [de-identified] y.o. female with history of multiple co morbidities as listed. Patient is referred for evaluation of thrombocytopenia. Patient is aware of this hematologic problem for so many years. Patient states that she had problems with thrombocytopenia before moving to [place] in [place]. She never needed any treatment. She was always told to have low platelet counts before surgeries. Never had any complications. Currently stable. No nosebleed or gum bleed. She has chronic easy bruising. No GI bleeding. No hematuria. No vaginal bleeding. No fever or night sweats. No enlarged lymph nodes. No weight loss or decreased appetite. No other complaints. Patient denies smoking or alcohol drinking. Oziel Kwok PAST MEDICAL HISTORY: has a past medical history of Age-related cognitive decline, Ankle pain, Anxiety, B12 deficiency, Winters esophagus, 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HCC), Caffeine use, Chronic back pain, Chronic ITP (idiopathic thrombocytopenia) (HCC), CVA (cerebral infarction), Diabetic gastroparesis ([address] Diverticular disease, GERD (gastroesophageal reflux disease), Hiatal hernia, Hip fracture ([address] History of blood transfusion, History of decreased platelet count, Hyperlipemia, Hypertension, Internal hemorrhoid, Macular degeneration of left eye, Nausea and vomiting, Neuropathy, Osteoarthritis, Ringing in ears, Self-catheterizes urinary bladder, TIA (transient ischemic attack), Tubular adenoma, Type II or unspecified type diabetes mellitus without mention of complication, not stated as uncontrolled, Urinary incontinence, Wears dentures, and Wears glasses.     PAST SURGICAL HISTORY: has a past surgical history that includes bladder suspension (2002); Hysterectomy (1969); Tonsillectomy (1978); Appendectomy (1962); Spine surgery (2010); colostomy (1986); Revision Colostomy (1986); Spine surgery (1990); Upper gastrointestinal endoscopy; Bladder surgery; Upper gastrointestinal endoscopy (05/05/2014); cardiovascular stress test (12/2014); Colon surgery; eunice and bso (cervix removed); fracture surgery (Right, 2011); fracture surgery (Left, 2001); fracture surgery (Left, 2013); Cardiac catheterization (2008); Revision total knee arthroplasty (Right, 10/27/2015); Colonoscopy (04/07/2016); Upper gastrointestinal endoscopy (04/07/2016); lumbar fusion (2017); Cystocopy (09/08/2017); Cataract removal with implant (Left, 11/07/2017); pr xcapsl ctrc rmvl insj io lens prosth w/o ecp (Left, 11/07/2017); Cataract removal with implant (Right, 11/28/2017); pr xcapsl ctrc rmvl insj io lens prosth w/o ecp (Right, 11/28/2017); Upper gastrointestinal endoscopy (N/A, 07/17/2018); joint replacement (Bilateral, 2000); hernia repair; Colonoscopy (N/A, 11/06/2019); Revision total knee arthroplasty (Left, 07/14/2020); and Colonoscopy (11/06/2019). CURRENT MEDICATIONS:  has a current medication list which includes the following prescription(s): nystatin-triamcinolone, sertraline, lubiprostone, nitrofurantoin (macrocrystal-monohydrate), irbesartan, lidocaine, wheat dextrin, benzonatate, albuterol, nystatin, esomeprazole, hydrocortisone, vitamin b-12, aspirin, ropinirole, glipizide, freestyle paola 14 day sensor, livalo, therapeutic multivitamin-minerals, multiple vitamins-minerals, freestyle paola 14 day reader, donepezil, estradiol, and cranberry, and the following Facility-Administered Medications: 0.9 % sodium chloride.     ALLERGIES:  is allergic to iodides, povidone-iodine, sulfa antibiotics, betadine [povidone iodine], cephalexin, cephalexin, cozaar [losartan], cymbalta [duloxetine hcl], demerol, doxycycline, meperidine, morphine, penicillins, zithromax [azithromycin], clindamycin/lincomycin, etodolac, fluorescein, iodine, lisinopril, penicillin g, soap, toviaz [fesoterodine fumarate er], clonazepam, and metformin and related. FAMILY HISTORY: Multiple family numbers with cancer including mother who had breast cancer at young age. Otherwise negative for any hematological or oncological conditions. SOCIAL HISTORY:  reports that she quit smoking about 39 years ago. She has a 10.00 pack-year smoking history. She quit smokeless tobacco use about 39 years ago. She reports that she does not drink alcohol and does not use drugs. REVIEW OF SYSTEMS:     · General: No weakness or fatigue. No unanticipated weight loss or decreased appetite. No fever or chills. · Eyes: No blurred vision, eye pain or double vision. · Ears: No hearing problems or drainage. No tinnitus. · Throat: No sore throat, problems with swallowing or dysphagia. · Respiratory: No cough, sputum or hemoptysis. No shortness of breath. No pleuritic chest pain. · Cardiovascular: No chest pain, orthopnea or PND. No lower extremity edema. No palpitation. · Gastrointestinal: No problems with swallowing. No abdominal pain or bloating. No nausea or vomiting. No diarrhea or constipation. No GI bleeding. · Genitourinary: No dysuria, hematuria, frequency or urgency. · Musculoskeletal: No muscle aches or pains. No limitation of movement. No back pain. No gait disturbance, No joint complaints. · Dermatologic: No skin rashes or pruritus. No skin lesions or discolorations. · Psychiatric: No depression, anxiety, or stress or signs of schizophrenia. No change in mood or affect. · Hematologic: No history of bleeding tendency. No bruises or ecchymosis. No history of clotting problems. · Infectious disease: No fever, chills or frequent infections. · Endocrine: No polydipsia or polyuria. No temperature intolerance. · Neurologic: No headaches or dizziness.  No weakness or numbness of be extrapolated by contextual diversion.

## 2021-08-30 ENCOUNTER — OFFICE VISIT (OUTPATIENT)
Dept: PRIMARY CARE CLINIC | Age: 81
End: 2021-08-30
Payer: MEDICARE

## 2021-08-30 VITALS
HEART RATE: 73 BPM | HEIGHT: 59 IN | DIASTOLIC BLOOD PRESSURE: 76 MMHG | WEIGHT: 157 LBS | SYSTOLIC BLOOD PRESSURE: 122 MMHG | OXYGEN SATURATION: 99 % | BODY MASS INDEX: 31.65 KG/M2

## 2021-08-30 DIAGNOSIS — R25.1 TREMOR OF RIGHT HAND: ICD-10-CM

## 2021-08-30 DIAGNOSIS — R25.1 TREMOR DUE TO DISORDER OF CNS: Primary | ICD-10-CM

## 2021-08-30 DIAGNOSIS — G96.9 TREMOR DUE TO DISORDER OF CNS: Primary | ICD-10-CM

## 2021-08-30 PROCEDURE — G8427 DOCREV CUR MEDS BY ELIG CLIN: HCPCS | Performed by: FAMILY MEDICINE

## 2021-08-30 PROCEDURE — G8400 PT W/DXA NO RESULTS DOC: HCPCS | Performed by: FAMILY MEDICINE

## 2021-08-30 PROCEDURE — 1090F PRES/ABSN URINE INCON ASSESS: CPT | Performed by: FAMILY MEDICINE

## 2021-08-30 PROCEDURE — 1036F TOBACCO NON-USER: CPT | Performed by: FAMILY MEDICINE

## 2021-08-30 PROCEDURE — 4040F PNEUMOC VAC/ADMIN/RCVD: CPT | Performed by: FAMILY MEDICINE

## 2021-08-30 PROCEDURE — 99213 OFFICE O/P EST LOW 20 MIN: CPT | Performed by: FAMILY MEDICINE

## 2021-08-30 PROCEDURE — 1123F ACP DISCUSS/DSCN MKR DOCD: CPT | Performed by: FAMILY MEDICINE

## 2021-08-30 PROCEDURE — G8417 CALC BMI ABV UP PARAM F/U: HCPCS | Performed by: FAMILY MEDICINE

## 2021-08-30 NOTE — PROGRESS NOTES
[address] [facility] PRIMARY CARE  [address] [facility] [address]  Dept: [address] [facility] [address] Lynne Lagunas is a [de-identified] y.o. female Established patient, who presents today for her medical conditions/complaintsas noted below. Chief Complaint   Patient presents with    Shaking     x4 weeks whole body shakes, mostly upper half pt states sometimes her legs       HPI:     HPI    Reviewed prior notes None  Reviewed previous Labs and Imaging  Has weaned off zoloft:  No worse with anxiety and depression and the tremors are NOT better off the zoloft. she notices she is having lip tremors also. she can't carry her coffee cup and can't hold things well, has trouble hold a pen. Has a neck pillow she has to use with sitting     Getting more off balance due to the tremors  Had PT in past for balance. Spasms in right arm and left foot moves constantly. Has an implant on the left side. Has wires in it. Can't have MRI      LDL Cholesterol (mg/dL)   Date Value   07/06/2020 85   10/17/2018 137 (H)   07/16/2018 64     LDL Calculated (mg/dL)   Date Value   03/12/2015 47   08/18/2014 39       (goal LDL is <100)   AST (U/L)   Date Value   07/28/2021 17     ALT (U/L)   Date Value   07/28/2021 18     BUN (mg/dL)   Date Value   07/28/2021 22     Hemoglobin A1C (%)   Date Value   08/03/2021 6.9 (H)     TSH (mIU/L)   Date Value   07/28/2021 1.98     BP Readings from Last 3 Encounters:   08/30/21 122/76   08/20/21 133/86   08/19/21 128/70          (goal 120/80)    Past Medical History:   Diagnosis Date    Age-related cognitive decline     Ankle pain     Left ankle fracture in ortho boat.     Anxiety     B12 deficiency     Winters esophagus     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[facility] [address] 1990    left with urinary incontinenc post surgical    Caffeine use     2 coffee/day, 1-2 tea per week    Chronic back pain     Chronic ITP (idiopathic thrombocytopenia) (HCC)     CVA (cerebral infarction) 2008, 2010    balance issues, short term memory loss    Diabetic gastroparesis ([facility] [address] [facility]     Diverticular disease 1986    GERD (gastroesophageal reflux disease)     Hiatal hernia     Hip fracture (HCC)     History of blood transfusion     History of decreased platelet count     Hyperlipemia     Hypertension     Internal hemorrhoid     Macular degeneration of left eye 1980's    S/P laser treatment    Nausea and vomiting     Neuropathy     Osteoarthritis     Ringing in ears     Self-catheterizes urinary bladder     6-7 times daily    TIA (transient ischemic attack) 2000    x1    Tubular adenoma     colon polyps    Type II or unspecified type diabetes mellitus without mention of complication, not stated as uncontrolled     Urinary incontinence     frequent UTI s/p infection, surgical dilatation lead to incontinence    Wears dentures     full on top, partial on bottom    Wears glasses       Past Surgical History:   Procedure Laterality Date    APPENDECTOMY  1962    BLADDER SURGERY      bladder stimulator    BLADDER SUSPENSION  2002    multiple    CARDIAC CATHETERIZATION  2008    no stents    CARDIOVASCULAR STRESS TEST  12/2014    CATARACT REMOVAL WITH IMPLANT Left 11/07/2017    [facility]    CATARACT REMOVAL WITH IMPLANT Right 11/28/2017    [facility]    COLON SURGERY      colostomy reversal    COLONOSCOPY  04/07/2016    tubular adenoma x3; internal hemorrhoids    COLONOSCOPY N/A 11/06/2019    COLONOSCOPY DIAGNOSTIC performed by Abimbola Riddle MD at [address] [place] [address]  11/06/2019    COLOSTOMY  1986    bowel obstruction/ rupture from diverticular disease, reversal 3 mos later    CYSTOSCOPY  09/08/2017    W/ 200IU Botox     FRACTURE SURGERY Right 2011    hip    FRACTURE SURGERY Left 2001    WRIST    FRACTURE SURGERY Left 2013    ankle    HERNIA REPAIR      HYSTERECTOMY  1969    JOINT REPLACEMENT Bilateral 2000    BILAT KNEES    LUMBAR FUSION  2017    L2/L3    ME XCAPSL CTRC RMVL INSJ IO LENS PROSTH W/O ECP Left 2017    EYE CATARACT EMULSIFICATION IOL IMPLANT performed by Agnes Jimenes MD at [address] W/O ECP Right 2017    EYE CATARACT EMULSIFICATION IOL IMPLANT performed by Agnes Jimenes MD at [facility] Right 10/27/2015    WITH POLY EXCHANGE BIOMET AND GPS APPLICATION    REVISION TOTAL KNEE ARTHROPLASTY Left 2020    KNEE TOTAL ARTHROPLASTY REVISION - POLY EXCHANGE performed by Vincenzo Sloan MD at [address]       fusion, did not take   [address]    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    NEAL AND BSO      TONSILLECTOMY      UPPER GASTROINTESTINAL ENDOSCOPY      UPPER GASTROINTESTINAL ENDOSCOPY  2014    UPPER GASTROINTESTINAL ENDOSCOPY  2016    mild gastritis    UPPER GASTROINTESTINAL ENDOSCOPY N/A 2018    retained thick secretions in proximal esophagus       Family History   Problem Relation Age of Onset    Breast Cancer Mother     Cancer Mother         breast and uterine  39    Heart Disease Father     Heart Attack Father          28   Camillia Ao Maternal Grandmother     Cancer Brother 46        bronchogenic adenocarcinoma cancer    Lung Cancer Brother     Cancer Sister         lung    Lung Cancer Sister          72    Breast Cancer Sister          62    Cancer Sister         breast       Social History     Tobacco Use    Smoking status: Former Smoker     Packs/day: 0.50     Years: 20.00     Pack years: 10.00     Quit date: 1982     Years since quittin.2    Smokeless tobacco: Former User     Quit date: 1982   Substance Use Topics    Alcohol use: No      Current Outpatient Medications   Medication Sig Dispense Refill    nystatin-triamcinolone (MYCOLOG II) 959590-5.1 UNIT/GM-% cream Apply topically 4 times daily Apply topically 4 times daily. 30 g 3    lubiprostone (AMITIZA) 8 MCG CAPS capsule Take 1 capsule by mouth daily 30 capsule 1    nitrofurantoin, macrocrystal-monohydrate, (MACROBID) 100 MG capsule Take 1 capsule by mouth daily 30 capsule 11    irbesartan (AVAPRO) 75 MG tablet TAKE 1 TABLET BY MOUTH ONE TIME A DAY  30 tablet 5    lidocaine (XYLOCAINE) 2 % jelly APPLY TOPICALLY AS NEEDED      Wheat Dextrin (BENEFIBER DRINK MIX PO) Take by mouth 2 tablespoons every night       benzonatate (TESSALON) 200 MG capsule Take 1 capsule by mouth as needed      albuterol (PROVENTIL) (5 MG/ML) 0.5% nebulizer solution Inhale 0.5 mLs into the lungs as needed      nystatin (MYCOSTATIN) 642789 UNIT/GM powder Apply 3 times daily.  45 g 3    esomeprazole (NEXIUM) 40 MG delayed release capsule Take 1 capsule by mouth every morning (before breakfast) 90 capsule 3    hydrocortisone (ANUSOL-HC) 2.5 % CREA rectal cream Place rectally 2 times daily 28 g 3    vitamin B-12 (CYANOCOBALAMIN) 1000 MCG tablet Take 1 tablet by mouth once a week 30 tablet 3    aspirin 81 MG EC tablet Take 1 tablet by mouth 2 times daily 30 tablet 3    rOPINIRole (REQUIP) 1 MG tablet Take by mouth nightly       glipiZIDE (GLUCOTROL) 5 MG tablet 2.5mg in the am and 2.5 mg in the pm      Continuous Blood Gluc Sensor (FREESTYLE DAVIS 14 DAY SENSOR) MISC       LIVALO 2 MG TABS tablet Take 2 mg by mouth nightly       Multiple Vitamins-Minerals (THERAPEUTIC MULTIVITAMIN-MINERALS) tablet Take 1 tablet by mouth daily      Multiple Vitamins-Minerals (PRESERVISION AREDS PO) Take by mouth daily       Continuous Blood Gluc  (FREESTYLE DAVIS 14 DAY READER) MATTHEW       donepezil (ARICEPT) 10 MG tablet Take 10 mg by mouth nightly       estradiol (ESTRACE VAGINAL) 0.1 MG/GM vaginal cream Place 1 g vaginally Twice a Week (Patient taking differently: Place 1 g vaginally every 14 days ) 1 Tube 5    CRANBERRY PO Take by mouth 2 times daily       No current facility-administered medications for this visit. Facility-Administered Medications Ordered in Other Visits   Medication Dose Route Frequency Provider Last Rate Last Admin    0.9 % sodium chloride infusion 250 mL  250 mL IntraVENous Once Margie Suazo MD         Allergies   Allergen Reactions    Iodides Anaphylaxis, Hives and Itching     \"Contrast dyes\"  This was added by someone but Patient states has had IVP dyes multiple times without problems and had a myelogram in Dec 2016 without problems    Povidone-Iodine Anaphylaxis, Hives and Swelling    Sulfa Antibiotics Hives and Swelling     Other reaction(s): Unknown    Betadine [Povidone Iodine] Hives    Cephalexin Hives and Swelling    Cephalexin Itching     Other reaction(s): Hallucinations  In 1969 received demerol and keflex together so was told to list both as allergies    Cozaar [Losartan] Nausea Only     Pt also gets sores on toungue    Cymbalta [Duloxetine Hcl] Diarrhea and Nausea And Vomiting     Weakness    Demerol Hives and Swelling    Doxycycline Other (See Comments)     Sore mouth        Meperidine Itching     Other reaction(s): Hallucinations      Morphine Hives and Itching    Penicillins Hives, Swelling and Itching     Other reaction(s): Intolerance-unknown  Other reaction(s): Unknown    Zithromax [Azithromycin] Other (See Comments)     Sore mouth      Clindamycin/Lincomycin     Etodolac     Fluorescein     Iodine      Other reaction(s):  Intolerance-unknown    Lisinopril      cough    Penicillin G     Soap     Toviaz [Fesoterodine Fumarate Er]     Clonazepam Other (See Comments)     From neuro: made her too sleepy    Metformin And Related Nausea And Vomiting     Diarrhea and N/V       Health Maintenance   Topic Date Due    Annual Wellness Visit (AWV)  Never done    Lipid screen  07/06/2021    DTaP/Tdap/Td vaccine (1 - Tdap) 01/09/2022 (Originally 8/12/1959)    Flu vaccine (1) 09/01/2021    Potassium monitoring 07/28/2022    Creatinine monitoring  07/28/2022    Shingles Vaccine  Completed    Pneumococcal 65+ years Vaccine  Completed    COVID-19 Vaccine  Completed    DEXA (modify frequency per FRAX score)  Addressed    Hepatitis A vaccine  Aged Out    Hib vaccine  Aged Out    Meningococcal (ACWY) vaccine  Aged Out       Subjective:      Review of Systems    Objective:     /76   Pulse 73   Ht 4' 11\" (1.499 m)   Wt 157 lb (71.2 kg)   SpO2 99%   BMI 31.71 kg/m²   Physical Exam  Vitals and nursing note reviewed. Constitutional:       Appearance: Normal appearance. Musculoskeletal:      Comments: Significant tremors right upper and lower arm, left foot taps frequently. Neurological:      Mental Status: She is alert. Psychiatric:         Mood and Affect: Mood normal.         Behavior: Behavior normal.         Thought Content: Thought content normal.         Assessment:       Diagnosis Orders   1. 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 Tremor of right hand  CT CERVICAL SPINE WO CONTRAST        Plan:      No follow-ups on file. Do home exercises  See neurology again after CT neck is done for more suggestions. Orders Placed This Encounter   Procedures    CT CERVICAL SPINE WO CONTRAST     Standing Status:   Future     Standing Expiration Date:   8/30/2022     No orders of the defined types were placed in this encounter. Patient given educationalmaterials - see patient instructions. Discussed use, benefit, and side effectsof prescribed medications. All patient questions answered. Pt voiced understanding. Reviewed health maintenance. Instructed to continue current medications, diet andexercise. Patient agreed with treatment plan. Follow up as directed.      Electronicallysigned by Diamond Segovia MD on 8/30/2021 at 3:13 PM

## 2021-09-02 ENCOUNTER — HOSPITAL ENCOUNTER (OUTPATIENT)
Dept: CT IMAGING | Age: 81
Discharge: HOME | End: 2021-09-04
Payer: MEDICARE

## 2021-09-02 DIAGNOSIS — R25.1 TREMOR DUE TO DISORDER OF CNS: ICD-10-CM

## 2021-09-02 DIAGNOSIS — R25.1 TREMOR OF RIGHT HAND: ICD-10-CM

## 2021-09-02 DIAGNOSIS — G96.9 TREMOR DUE TO DISORDER OF CNS: ICD-10-CM

## 2021-09-02 PROCEDURE — 72125 CT NECK SPINE W/O DYE: CPT

## 2021-09-08 DIAGNOSIS — G96.9 TREMOR DUE TO DISORDER OF CNS: Primary | ICD-10-CM

## 2021-09-08 DIAGNOSIS — R25.1 TREMOR OF RIGHT HAND: ICD-10-CM

## 2021-09-08 DIAGNOSIS — R25.1 TREMOR DUE TO DISORDER OF CNS: Primary | ICD-10-CM

## 2021-09-23 ENCOUNTER — OFFICE VISIT (OUTPATIENT)
Dept: PRIMARY CARE CLINIC | Age: 81
End: 2021-09-23
Payer: MEDICARE

## 2021-09-23 VITALS
OXYGEN SATURATION: 98 % | SYSTOLIC BLOOD PRESSURE: 122 MMHG | HEART RATE: 86 BPM | HEIGHT: 59 IN | DIASTOLIC BLOOD PRESSURE: 78 MMHG | WEIGHT: 158.8 LBS | BODY MASS INDEX: 32.01 KG/M2

## 2021-09-23 DIAGNOSIS — K56.41 FECAL IMPACTION (HCC): Primary | ICD-10-CM

## 2021-09-23 DIAGNOSIS — Z23 NEEDS FLU SHOT: ICD-10-CM

## 2021-09-23 PROCEDURE — 1036F TOBACCO NON-USER: CPT | Performed by: FAMILY MEDICINE

## 2021-09-23 PROCEDURE — G8400 PT W/DXA NO RESULTS DOC: HCPCS | Performed by: FAMILY MEDICINE

## 2021-09-23 PROCEDURE — G0008 ADMIN INFLUENZA VIRUS VAC: HCPCS | Performed by: FAMILY MEDICINE

## 2021-09-23 PROCEDURE — 4040F PNEUMOC VAC/ADMIN/RCVD: CPT | Performed by: FAMILY MEDICINE

## 2021-09-23 PROCEDURE — 90694 VACC AIIV4 NO PRSRV 0.5ML IM: CPT | Performed by: FAMILY MEDICINE

## 2021-09-23 PROCEDURE — 99214 OFFICE O/P EST MOD 30 MIN: CPT | Performed by: FAMILY MEDICINE

## 2021-09-23 PROCEDURE — G8417 CALC BMI ABV UP PARAM F/U: HCPCS | Performed by: FAMILY MEDICINE

## 2021-09-23 PROCEDURE — 1123F ACP DISCUSS/DSCN MKR DOCD: CPT | Performed by: FAMILY MEDICINE

## 2021-09-23 PROCEDURE — 1090F PRES/ABSN URINE INCON ASSESS: CPT | Performed by: FAMILY MEDICINE

## 2021-09-23 PROCEDURE — G8427 DOCREV CUR MEDS BY ELIG CLIN: HCPCS | Performed by: FAMILY MEDICINE

## 2021-09-23 ASSESSMENT — ENCOUNTER SYMPTOMS: CONSTIPATION: 1

## 2021-09-23 NOTE — PROGRESS NOTES
[address] [place] [address] PRIMARY CARE  [address] [facility] [address]  Dept: [address] [facility] Monica Cruz is a [de-identified] y.o. female Established patient, who presents today for her medical conditions/complaintsas noted below. Chief Complaint   Patient presents with    Tremors     f/u    Depression     f/u    Other     pt was in ER for fecal impaction 9/16/21       HPI:     HPI  Taking her bowel meds. Eating high fiber cereal.   Eats fruit. Had ER visit due to bowel impaction 9/16/21, was having severe pain. Had bad time yesterday also. Sometimes goes 5-6 days between BM's    Reviewed prior notes Neurology  Reviewed previous Labs, Imaging and Hospital Records  CT: severe DDD  Sees Dr Ivon Bradley  LDL Cholesterol (mg/dL)   Date Value   07/06/2020 85   10/17/2018 137 (H)   07/16/2018 64     LDL Calculated (mg/dL)   Date Value   03/12/2015 47   08/18/2014 39       (goal LDL is <100)   AST (U/L)   Date Value   07/28/2021 17     ALT (U/L)   Date Value   07/28/2021 18     BUN (mg/dL)   Date Value   07/28/2021 22     Hemoglobin A1C (%)   Date Value   08/03/2021 6.9 (H)     TSH (mIU/L)   Date Value   07/28/2021 1.98     BP Readings from Last 3 Encounters:   09/23/21 122/78   08/30/21 122/76   08/20/21 133/86          (goal 120/80)    Past Medical History:   Diagnosis Date    Age-related cognitive decline     Ankle pain     Left ankle fracture in ortho boat.    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    left with urinary incontinenc post surgical    Caffeine use     2 coffee/day, 1-2 tea per week    Chronic back pain     Chronic ITP (idiopathic thrombocytopenia) (HCC)     CVA (cerebral infarction) 2008, 2010    balance issues, short term memory loss    Diabetic gastroparesis ([address]     Diverticular disease 1986    GERD (gastroesophageal reflux disease)     Hiatal hernia     Hip fracture ([address]     History of blood transfusion     History of decreased platelet count     Hyperlipemia     Hypertension     Internal hemorrhoid     Macular degeneration of left eye 1980's    S/P laser treatment    Nausea and vomiting     Neuropathy     Osteoarthritis     Ringing in ears     Self-catheterizes urinary bladder     6-7 times daily    TIA (transient ischemic attack) 2000    x1    Tubular adenoma     colon polyps    Type II or unspecified type diabetes mellitus without mention of complication, not stated as uncontrolled     Urinary incontinence     frequent UTI s/p infection, surgical dilatation lead to incontinence    Wears dentures     full on top, partial on bottom    Wears glasses       Past Surgical History:   Procedure Laterality Date    APPENDECTOMY  1962    BLADDER SURGERY      bladder stimulator    BLADDER SUSPENSION  2002    multiple    CARDIAC CATHETERIZATION  2008    no stents    CARDIOVASCULAR STRESS TEST  12/2014    CATARACT REMOVAL WITH IMPLANT Left 11/07/2017    [facility]    CATARACT REMOVAL WITH IMPLANT Right 11/28/2017    [facility]    COLON SURGERY      colostomy reversal    COLONOSCOPY  04/07/2016    tubular adenoma x3; internal hemorrhoids    COLONOSCOPY N/A 11/06/2019    COLONOSCOPY DIAGNOSTIC performed by Michael Padilla MD at [address] [place] [address]  11/06/2019    COLOSTOMY  1986    bowel obstruction/ rupture from diverticular disease, reversal 3 mos later    CYSTOSCOPY  09/08/2017    W/ 200IU Botox     FRACTURE SURGERY Right 2011    hip    FRACTURE SURGERY Left 2001    WRIST    FRACTURE SURGERY Left 2013    ankle    HERNIA REPAIR      HYSTERECTOMY  1969    JOINT REPLACEMENT Bilateral 2000    BILAT KNEES    LUMBAR FUSION  2017    L2/L3    GA XCAPSL CTRC RMVL INSJ IO LENS PROSTH W/O ECP Left 11/07/2017    EYE CATARACT EMULSIFICATION IOL IMPLANT performed by Shabana Nguyen MD at [facility] 60 RMVL INSJ IO LENS PROSTH W/O ECP Right 2017    EYE CATARACT EMULSIFICATION IOL IMPLANT performed by Maddison Das MD at [facility] Right 10/27/2015    WITH POLY EXCHANGE BIOMET AND GPS APPLICATION    REVISION TOTAL KNEE ARTHROPLASTY Left 2020    KNEE TOTAL ARTHROPLASTY REVISION - POLY EXCHANGE performed by Nadeen Mccallum MD at [address]  2010    fusion, did not take   [address]    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    NEAL AND BSO      TONSILLECTOMY      UPPER GASTROINTESTINAL ENDOSCOPY      UPPER GASTROINTESTINAL ENDOSCOPY  2014    UPPER GASTROINTESTINAL ENDOSCOPY  2016    mild gastritis    UPPER GASTROINTESTINAL ENDOSCOPY N/A 2018    retained thick secretions in proximal esophagus       Family History   Problem Relation Age of Onset    Breast Cancer Mother     Cancer Mother         breast and uterine  39    Heart Disease Father     Heart Attack Father          28   [address] [facility] Maternal Grandmother     Cancer Brother 46        bronchogenic adenocarcinoma cancer    Lung Cancer Brother     Cancer Sister         lung    Lung Cancer Sister          72    Breast Cancer Sister          62    Cancer Sister         breast       Social History     Tobacco Use    Smoking status: Former Smoker     Packs/day: 0.50     Years: 20.00     Pack years: 10.00     Quit date: 1982     Years since quittin.3    Smokeless tobacco: Former User     Quit date: 1982   Substance Use Topics    Alcohol use: No      Current Outpatient Medications   Medication Sig Dispense Refill    nystatin-triamcinolone (MYCOLOG II) 010204-7.1 UNIT/GM-% cream Apply topically 4 times daily Apply topically 4 times daily.  30 g 3    lubiprostone (AMITIZA) 8 MCG CAPS capsule Take 1 capsule by mouth daily 30 capsule 1    nitrofurantoin, macrocrystal-monohydrate, (MACROBID) 100 MG capsule Take 1 capsule by mouth daily 30 capsule 11    irbesartan (AVAPRO) 75 MG tablet TAKE 1 TABLET BY MOUTH ONE TIME A DAY  30 tablet 5    lidocaine (XYLOCAINE) 2 % jelly APPLY TOPICALLY AS NEEDED      Wheat Dextrin (BENEFIBER DRINK MIX PO) Take by mouth 2 tablespoons every night       benzonatate (TESSALON) 200 MG capsule Take 1 capsule by mouth as needed      nystatin (MYCOSTATIN) 411315 UNIT/GM powder Apply 3 times daily. 45 g 3    esomeprazole (NEXIUM) 40 MG delayed release capsule Take 1 capsule by mouth every morning (before breakfast) 90 capsule 3    hydrocortisone (ANUSOL-HC) 2.5 % CREA rectal cream Place rectally 2 times daily 28 g 3    vitamin B-12 (CYANOCOBALAMIN) 1000 MCG tablet Take 1 tablet by mouth once a week 30 tablet 3    aspirin 81 MG EC tablet Take 1 tablet by mouth 2 times daily 30 tablet 3    rOPINIRole (REQUIP) 1 MG tablet Take by mouth 2 times daily       glipiZIDE (GLUCOTROL) 5 MG tablet 2.5mg in the am and 2.5 mg in the pm      Continuous Blood Gluc Sensor (boolinoSTYLE DAVIS 14 DAY SENSOR) MISC       LIVALO 2 MG TABS tablet Take 2 mg by mouth nightly       Multiple Vitamins-Minerals (PRESERVISION AREDS PO) Take by mouth daily       Continuous Blood Gluc  (FREESTYLE DAVIS 14 DAY READER) MATTHEW       donepezil (ARICEPT) 10 MG tablet Take 10 mg by mouth nightly       estradiol (ESTRACE VAGINAL) 0.1 MG/GM vaginal cream Place 1 g vaginally Twice a Week (Patient taking differently: Place 1 g vaginally every 14 days ) 1 Tube 5    CRANBERRY PO Take by mouth 2 times daily      albuterol (PROVENTIL) (5 MG/ML) 0.5% nebulizer solution Inhale 0.5 mLs into the lungs as needed (Patient not taking: Reported on 9/23/2021)      Multiple Vitamins-Minerals (THERAPEUTIC MULTIVITAMIN-MINERALS) tablet Take 1 tablet by mouth daily (Patient not taking: Reported on 9/23/2021)       No current facility-administered medications for this visit.      Facility-Administered Medications Ordered in Other Visits   Medication Dose Route Frequency Provider Last Rate Last Admin    0.9 % sodium chloride infusion 250 mL  250 mL IntraVENous Once Javon Moe MD         Allergies   Allergen Reactions    Iodides Anaphylaxis, Hives and Itching     \"Contrast dyes\"  This was added by someone but Patient states has had IVP dyes multiple times without problems and had a myelogram in Dec 2016 without problems    Povidone-Iodine Anaphylaxis, Hives and Swelling    Sulfa Antibiotics Hives and Swelling     Other reaction(s): Unknown    Betadine [Povidone Iodine] Hives    Cephalexin Hives and Swelling    Cephalexin Itching     Other reaction(s): Hallucinations  In 1969 received demerol and keflex together so was told to list both as allergies    Cozaar [Losartan] Nausea Only     Pt also gets sores on toungue    Cymbalta [Duloxetine Hcl] Diarrhea and Nausea And Vomiting     Weakness    Demerol Hives and Swelling    Doxycycline Other (See Comments)     Sore mouth        Meperidine Itching     Other reaction(s): Hallucinations      Morphine Hives and Itching    Penicillins Hives, Swelling and Itching     Other reaction(s): Intolerance-unknown  Other reaction(s): Unknown    Zithromax [Azithromycin] Other (See Comments)     Sore mouth      Clindamycin/Lincomycin     Etodolac     Fluorescein     Iodine      Other reaction(s):  Intolerance-unknown    Lisinopril      cough    Penicillin G     Soap     Toviaz [Fesoterodine Fumarate Er]     Clonazepam Other (See Comments)     From neuro: made her too sleepy    Metformin And Related Nausea And Vomiting     Diarrhea and N/V       Health Maintenance   Topic Date Due    Annual Wellness Visit (AWV)  Never done    Lipid screen  07/06/2021    DTaP/Tdap/Td vaccine (1 - Tdap) 01/09/2022 (Originally 8/12/1959)    Potassium monitoring  07/28/2022    Creatinine monitoring  07/28/2022    Flu vaccine  Completed    Shingles Vaccine  Completed    Pneumococcal 65+ years Vaccine  Completed    COVID-19 Vaccine  Completed    DEXA (modify frequency per FRAX score)  Addressed    Hepatitis A vaccine  Aged Out    Hib vaccine  Aged Out    Meningococcal (ACWY) vaccine  Aged Out       Subjective:      Review of Systems   Gastrointestinal: Positive for constipation. Objective:     /78   Pulse 86   Ht 4' 11\" (1.499 m)   Wt 158 lb 12.8 oz (72 kg)   SpO2 98%   BMI 32.07 kg/m²   Physical Exam  Vitals and nursing note reviewed. Constitutional:       Appearance: Normal appearance. HENT:      Head: Normocephalic and atraumatic. Cardiovascular:      Rate and Rhythm: Normal rate and regular rhythm. Pulmonary:      Effort: Pulmonary effort is normal. No respiratory distress. Breath sounds: Normal breath sounds. No stridor. Skin:     General: Skin is warm. Neurological:      Mental Status: She is alert and oriented to person, place, and time. Comments: Very mild hand tremors right worse than left. Mild left leg shaking and tremors. Overall tremors are better today than usual.   Psychiatric:         Mood and Affect: Mood normal.         Behavior: Behavior normal.         Thought Content: Thought content normal.         Assessment:       Diagnosis Orders   1. Fecal impaction (HCC)  XR ABDOMEN (KUB) (SINGLE AP VIEW)   2. Needs flu shot  INFLUENZA, QUADV, ADJUVANTED, 65 YRS =, IM, PF, PREFILL SYR, 0.5ML (FLUAD)        Plan:      No follow-ups on file. See Dr Yasmeen Dickinson re: bowel issues  Discussed trying suppositories more regularly to prevent impaction  F/u with Neuroloigist.   Orders Placed This Encounter   Procedures    XR ABDOMEN (KUB) (SINGLE AP VIEW)     Standing Status:   Future     Standing Expiration Date:   9/23/2022    INFLUENZA, QUADV, ADJUVANTED, 65 YRS =, IM, PF, PREFILL SYR, 0.5ML (FLUAD)     No orders of the defined types were placed in this encounter. Patient given educationalmaterials - see patient instructions.   Discussed use, benefit, and side effectsof prescribed medications. All patient questions answered. Pt voiced understanding. Reviewed health maintenance. Instructed to continue current medications, diet andexercise. Patient agreed with treatment plan. Follow up as directed.      Electronicallysigned by Evelina Benavides MD on 9/23/2021 at 2:23 PM

## 2021-09-24 ENCOUNTER — HOSPITAL ENCOUNTER (OUTPATIENT)
Age: 81
Discharge: HOME | End: 2021-09-26
Payer: MEDICARE

## 2021-09-24 ENCOUNTER — HOSPITAL ENCOUNTER (OUTPATIENT)
Dept: GENERAL RADIOLOGY | Age: 81
Discharge: HOME | End: 2021-09-26
Payer: MEDICARE

## 2021-09-24 DIAGNOSIS — K56.41 FECAL IMPACTION (HCC): ICD-10-CM

## 2021-09-24 PROCEDURE — 74018 RADEX ABDOMEN 1 VIEW: CPT

## 2021-10-07 RX ORDER — PITAVASTATIN CALCIUM 2.09 MG/1
2 TABLET, FILM COATED ORAL NIGHTLY
Qty: 30 TABLET | Refills: 3 | Status: SHIPPED | OUTPATIENT
Start: 2021-10-07 | End: 2022-03-29

## 2021-10-18 ENCOUNTER — HOSPITAL ENCOUNTER (OUTPATIENT)
Age: 81
Discharge: HOME | End: 2021-10-18
Payer: MEDICARE

## 2021-10-18 DIAGNOSIS — N32.89 BLADDER SPASMS: ICD-10-CM

## 2021-10-18 DIAGNOSIS — R33.9 URINARY RETENTION: ICD-10-CM

## 2021-10-18 DIAGNOSIS — N39.0 RECURRENT UTI: ICD-10-CM

## 2021-10-18 PROCEDURE — 87086 URINE CULTURE/COLONY COUNT: CPT

## 2021-10-20 LAB
CULTURE: ABNORMAL
Lab: ABNORMAL
SPECIMEN DESCRIPTION: ABNORMAL

## 2021-11-03 ENCOUNTER — NURSE ONLY (OUTPATIENT)
Dept: FAMILY MEDICINE CLINIC | Age: 81
End: 2021-11-03

## 2021-11-03 ENCOUNTER — OFFICE VISIT (OUTPATIENT)
Dept: PRIMARY CARE CLINIC | Age: 81
End: 2021-11-03
Payer: MEDICARE

## 2021-11-03 ENCOUNTER — HOSPITAL ENCOUNTER (OUTPATIENT)
Facility: CLINIC | Age: 81
Discharge: HOME | End: 2021-11-05
Payer: MEDICARE

## 2021-11-03 ENCOUNTER — HOSPITAL ENCOUNTER (OUTPATIENT)
Dept: GENERAL RADIOLOGY | Facility: CLINIC | Age: 81
Discharge: HOME | End: 2021-11-05
Payer: MEDICARE

## 2021-11-03 ENCOUNTER — OFFICE VISIT (OUTPATIENT)
Dept: INFECTIOUS DISEASES | Age: 81
End: 2021-11-03
Payer: MEDICARE

## 2021-11-03 ENCOUNTER — HOSPITAL ENCOUNTER (OUTPATIENT)
Age: 81
Setting detail: SPECIMEN
Discharge: HOME | End: 2021-11-03
Payer: MEDICARE

## 2021-11-03 VITALS
BODY MASS INDEX: 31.85 KG/M2 | DIASTOLIC BLOOD PRESSURE: 72 MMHG | TEMPERATURE: 97.2 F | HEART RATE: 53 BPM | WEIGHT: 158 LBS | OXYGEN SATURATION: 92 % | RESPIRATION RATE: 15 BRPM | HEIGHT: 59 IN | SYSTOLIC BLOOD PRESSURE: 136 MMHG

## 2021-11-03 VITALS
DIASTOLIC BLOOD PRESSURE: 68 MMHG | WEIGHT: 218 LBS | OXYGEN SATURATION: 99 % | SYSTOLIC BLOOD PRESSURE: 120 MMHG | BODY MASS INDEX: 44.03 KG/M2 | HEART RATE: 82 BPM

## 2021-11-03 DIAGNOSIS — R05.9 COUGH: ICD-10-CM

## 2021-11-03 DIAGNOSIS — J40 BRONCHITIS: Primary | ICD-10-CM

## 2021-11-03 DIAGNOSIS — R05.9 COUGH: Primary | ICD-10-CM

## 2021-11-03 LAB
ADENOVIRUS PCR: NOT DETECTED
BORDETELLA PARAPERTUSSIS: NOT DETECTED
BORDETELLA PERTUSSIS PCR: NOT DETECTED
CHLAMYDIA PNEUMONIAE BY PCR: NOT DETECTED
CORONAVIRUS 229E PCR: NOT DETECTED
CORONAVIRUS HKU1 PCR: NOT DETECTED
CORONAVIRUS NL63 PCR: NOT DETECTED
CORONAVIRUS OC43 PCR: NOT DETECTED
HUMAN METAPNEUMOVIRUS PCR: NOT DETECTED
INFLUENZA A BY PCR: NOT DETECTED
INFLUENZA A H1 (2009) PCR: NORMAL
INFLUENZA A H1 PCR: NORMAL
INFLUENZA A H3 PCR: NORMAL
INFLUENZA B BY PCR: NOT DETECTED
MYCOPLASMA PNEUMONIAE PCR: NOT DETECTED
PARAINFLUENZA 1 PCR: NOT DETECTED
PARAINFLUENZA 2 PCR: NOT DETECTED
PARAINFLUENZA 3 PCR: NOT DETECTED
PARAINFLUENZA 4 PCR: NOT DETECTED
RESP SYNCYTIAL VIRUS PCR: NOT DETECTED
RHINO/ENTEROVIRUS PCR: NOT DETECTED
SARS-COV-2, PCR: NOT DETECTED
SPECIMEN DESCRIPTION: NORMAL

## 2021-11-03 PROCEDURE — 1036F TOBACCO NON-USER: CPT | Performed by: FAMILY MEDICINE

## 2021-11-03 PROCEDURE — 99204 OFFICE O/P NEW MOD 45 MIN: CPT | Performed by: INTERNAL MEDICINE

## 2021-11-03 PROCEDURE — G8417 CALC BMI ABV UP PARAM F/U: HCPCS | Performed by: INTERNAL MEDICINE

## 2021-11-03 PROCEDURE — G8417 CALC BMI ABV UP PARAM F/U: HCPCS | Performed by: FAMILY MEDICINE

## 2021-11-03 PROCEDURE — G8400 PT W/DXA NO RESULTS DOC: HCPCS | Performed by: FAMILY MEDICINE

## 2021-11-03 PROCEDURE — 4040F PNEUMOC VAC/ADMIN/RCVD: CPT | Performed by: INTERNAL MEDICINE

## 2021-11-03 PROCEDURE — 1036F TOBACCO NON-USER: CPT | Performed by: INTERNAL MEDICINE

## 2021-11-03 PROCEDURE — G8484 FLU IMMUNIZE NO ADMIN: HCPCS | Performed by: FAMILY MEDICINE

## 2021-11-03 PROCEDURE — 4040F PNEUMOC VAC/ADMIN/RCVD: CPT | Performed by: FAMILY MEDICINE

## 2021-11-03 PROCEDURE — 1090F PRES/ABSN URINE INCON ASSESS: CPT | Performed by: FAMILY MEDICINE

## 2021-11-03 PROCEDURE — 1123F ACP DISCUSS/DSCN MKR DOCD: CPT | Performed by: INTERNAL MEDICINE

## 2021-11-03 PROCEDURE — G8427 DOCREV CUR MEDS BY ELIG CLIN: HCPCS | Performed by: INTERNAL MEDICINE

## 2021-11-03 PROCEDURE — G8400 PT W/DXA NO RESULTS DOC: HCPCS | Performed by: INTERNAL MEDICINE

## 2021-11-03 PROCEDURE — 71046 X-RAY EXAM CHEST 2 VIEWS: CPT

## 2021-11-03 PROCEDURE — G8427 DOCREV CUR MEDS BY ELIG CLIN: HCPCS | Performed by: FAMILY MEDICINE

## 2021-11-03 PROCEDURE — 1090F PRES/ABSN URINE INCON ASSESS: CPT | Performed by: INTERNAL MEDICINE

## 2021-11-03 PROCEDURE — 1123F ACP DISCUSS/DSCN MKR DOCD: CPT | Performed by: FAMILY MEDICINE

## 2021-11-03 PROCEDURE — 99213 OFFICE O/P EST LOW 20 MIN: CPT | Performed by: FAMILY MEDICINE

## 2021-11-03 PROCEDURE — G8484 FLU IMMUNIZE NO ADMIN: HCPCS | Performed by: INTERNAL MEDICINE

## 2021-11-03 RX ORDER — PREDNISONE 20 MG/1
20 TABLET ORAL DAILY
Qty: 7 TABLET | Refills: 0 | Status: SHIPPED | OUTPATIENT
Start: 2021-11-03 | End: 2021-11-10

## 2021-11-03 RX ORDER — AZITHROMYCIN 250 MG/1
250 TABLET, FILM COATED ORAL SEE ADMIN INSTRUCTIONS
Qty: 6 TABLET | Refills: 0 | Status: SHIPPED | OUTPATIENT
Start: 2021-11-03 | End: 2021-11-08

## 2021-11-03 ASSESSMENT — ENCOUNTER SYMPTOMS
SHORTNESS OF BREATH: 1
COUGH: 1

## 2021-11-03 NOTE — PROGRESS NOTES
Silas Bruno is a [de-identified] y.o. femalewho presents today for her medical conditions/complaints as noted below. Chief Complaint   Patient presents with    Cough     Dry cough most of the time. Phelgm is clear when able to get out.  Shortness of Breath     x 5 days          HPI:     HPI   saw ID today for   Had Cxr and covid test    Cough x 5 days, scant phlegm, thin  SOB: x 4-5 days. No fever or chills. . + headaches. Current Outpatient Medications   Medication Sig Dispense Refill    predniSONE (DELTASONE) 20 MG tablet Take 1 tablet by mouth daily for 7 days 7 tablet 0    azithromycin (ZITHROMAX) 250 MG tablet Take 1 tablet by mouth See Admin Instructions for 5 days 500mg on day 1 followed by 250mg on days 2 - 5 6 tablet 0    D-Mannose 350 MG CAPS Take 300 mg by mouth daily 30 capsule 3    fluconazole (DIFLUCAN) 100 MG tablet Take 1 tablet by mouth daily 10 tablet 0    LIVALO 2 MG TABS tablet Take 1 tablet by mouth nightly 30 tablet 3    nystatin-triamcinolone (MYCOLOG II) 685203-7.1 UNIT/GM-% cream Apply topically 4 times daily Apply topically 4 times daily. 30 g 3    lubiprostone (AMITIZA) 8 MCG CAPS capsule Take 1 capsule by mouth daily 30 capsule 1    nitrofurantoin, macrocrystal-monohydrate, (MACROBID) 100 MG capsule Take 1 capsule by mouth daily 30 capsule 11    irbesartan (AVAPRO) 75 MG tablet TAKE 1 TABLET BY MOUTH ONE TIME A DAY  30 tablet 5    lidocaine (XYLOCAINE) 2 % jelly APPLY TOPICALLY AS NEEDED      Wheat Dextrin (BENEFIBER DRINK MIX PO) Take by mouth 2 tablespoons every night       benzonatate (TESSALON) 200 MG capsule Take 1 capsule by mouth as needed      albuterol (PROVENTIL) (5 MG/ML) 0.5% nebulizer solution Inhale 0.5 mLs into the lungs as needed       nystatin (MYCOSTATIN) 733886 UNIT/GM powder Apply 3 times daily.  45 g 3    esomeprazole (NEXIUM) 40 MG delayed release capsule Take 1 capsule by mouth every morning (before breakfast) 90 capsule 3    hydrocortisone (ANUSOL-HC) 2.5 % CREA rectal cream Place rectally 2 times daily 28 g 3    vitamin B-12 (CYANOCOBALAMIN) 1000 MCG tablet Take 1 tablet by mouth once a week 30 tablet 3    aspirin 81 MG EC tablet Take 1 tablet by mouth 2 times daily 30 tablet 3    rOPINIRole (REQUIP) 1 MG tablet Take by mouth 2 times daily       glipiZIDE (GLUCOTROL) 5 MG tablet 2.5mg in the am and 2.5 mg in the pm      Continuous Blood Gluc Sensor (FREESTYLE DAVIS 14 DAY SENSOR) MISC       Multiple Vitamins-Minerals (THERAPEUTIC MULTIVITAMIN-MINERALS) tablet Take 1 tablet by mouth daily       Multiple Vitamins-Minerals (PRESERVISION AREDS PO) Take by mouth daily       Continuous Blood Gluc  (FREESTYLE DAVIS 14 DAY READER) MATTHEW       donepezil (ARICEPT) 10 MG tablet Take 10 mg by mouth nightly       estradiol (ESTRACE VAGINAL) 0.1 MG/GM vaginal cream Place 1 g vaginally Twice a Week (Patient taking differently: Place 1 g vaginally every 14 days ) 1 Tube 5    CRANBERRY PO Take by mouth 2 times daily       No current facility-administered medications for this visit. Facility-Administered Medications Ordered in Other Visits   Medication Dose Route Frequency Provider Last Rate Last Admin    0.9 % sodium chloride infusion 250 mL  250 mL IntraVENous Once Dank Pichardo MD         Allergies   Allergen Reactions    Iodides Anaphylaxis, Hives and Itching     \"Contrast dyes\"  This was added by someone but Patient states has had IVP dyes multiple times without problems and had a myelogram in Dec 2016 without problems    Povidone-Iodine Anaphylaxis, Hives and Swelling    Sulfa Antibiotics Hives and Swelling     Other reaction(s): Unknown  Other reaction(s):  Intolerance-unknown  Hands, feet, mouth, eyes  Other reaction(s): Unknown    Betadine [Povidone Iodine] Hives    Cephalexin Hives and Swelling    Cephalexin Itching     Other reaction(s): Hallucinations  In 1969 received demerol and keflex together so was told to list both as allergies    Cozaar [Losartan] Nausea Only     Pt also gets sores on toungue    Cymbalta [Duloxetine Hcl] Diarrhea and Nausea And Vomiting     Weakness    Demerol Hives and Swelling    Doxycycline Other (See Comments)     Sore mouth        Meperidine Itching     Other reaction(s): Hallucinations      Morphine Hives and Itching    Penicillins Hives, Swelling and Itching     Other reaction(s): Intolerance-unknown  Other reaction(s): Unknown    Zithromax [Azithromycin] Other (See Comments)     Sore mouth      Clindamycin/Lincomycin     Etodolac     Fluorescein     Iodine      Other reaction(s): Intolerance-unknown    Lisinopril      cough    Penicillin G     Soap     Toviaz [Fesoterodine Fumarate Er]     Clonazepam Other (See Comments)     From neuro: made her too sleepy    Metformin And Related Nausea And Vomiting     Diarrhea and N/V       Subjective:     Review of Systems   Constitutional: Negative for chills and fever. Respiratory: Positive for cough and shortness of breath. Objective:     /68   Pulse 82   Wt 218 lb (98.9 kg)   SpO2 99%   BMI 44.03 kg/m²   Physical Exam  Vitals and nursing note reviewed. Constitutional:       General: She is not in acute distress. Appearance: She is well-developed. She is not ill-appearing. HENT:      Head: Normocephalic and atraumatic. Right Ear: Tympanic membrane, ear canal and external ear normal.      Left Ear: Tympanic membrane, ear canal and external ear normal.      Mouth/Throat:      Mouth: Mucous membranes are moist.      Pharynx: No oropharyngeal exudate or posterior oropharyngeal erythema. Eyes:      General: No scleral icterus. Right eye: No discharge. Left eye: No discharge. Conjunctiva/sclera: Conjunctivae normal.   Neck:      Thyroid: No thyromegaly. Trachea: No tracheal deviation. Cardiovascular:      Rate and Rhythm: Normal rate and regular rhythm. Heart sounds: Normal heart sounds. Pulmonary:      Effort: Pulmonary effort is normal. No respiratory distress. Breath sounds: Wheezing present. Comments: Fair insp effort and mild insp and exp wheezing. Lymphadenopathy:      Cervical: No cervical adenopathy. Skin:     General: Skin is warm. Findings: No rash. Neurological:      Mental Status: She is alert and oriented to person, place, and time. Psychiatric:         Mood and Affect: Mood normal.         Behavior: Behavior normal.         Thought Content: Thought content normal.         Assessment:       Diagnosis Orders   1. Bronchitis  predniSONE (DELTASONE) 20 MG tablet    azithromycin (ZITHROMAX) 250 MG tablet        Plan:      No follow-ups on file. Use nebulizer 3 times a day. Call prn  Discussed meds. No orders of the defined types were placed in this encounter. Orders Placed This Encounter   Medications    predniSONE (DELTASONE) 20 MG tablet     Sig: Take 1 tablet by mouth daily for 7 days     Dispense:  7 tablet     Refill:  0    azithromycin (ZITHROMAX) 250 MG tablet     Sig: Take 1 tablet by mouth See Admin Instructions for 5 days 500mg on day 1 followed by 250mg on days 2 - 5     Dispense:  6 tablet     Refill:  0      Reviewed medications and possibleside effects.        Electronically signed by Yaz Brink MD on 11/3/2021 at 11:55 AM

## 2021-11-03 NOTE — PATIENT INSTRUCTIONS
Patient Education        Bronchitis: Care Instructions  Your Care Instructions     Bronchitis is inflammation of the bronchial tubes, which carry air to the lungs. The tubes swell and produce mucus, or phlegm. The mucus and inflamed bronchial tubes make you cough. You may have trouble breathing. Most cases of bronchitis are caused by viruses like those that cause colds. Antibiotics usually do not help and they may be harmful. Bronchitis usually develops rapidly and lasts about 2 to 3 weeks in otherwise healthy people. Follow-up care is a key part of your treatment and safety. Be sure to make and go to all appointments, and call your doctor if you are having problems. It's also a good idea to know your test results and keep a list of the medicines you take. How can you care for yourself at home? · Take all medicines exactly as prescribed. Call your doctor if you think you are having a problem with your medicine. · Get some extra rest.  · Take an over-the-counter pain medicine, such as acetaminophen (Tylenol), ibuprofen (Advil, Motrin), or naproxen (Aleve) to reduce fever and relieve body aches. Read and follow all instructions on the label. · Do not take two or more pain medicines at the same time unless the doctor told you to. Many pain medicines have acetaminophen, which is Tylenol. Too much acetaminophen (Tylenol) can be harmful. · Take an over-the-counter cough medicine to help quiet a dry, hacking cough so that you can sleep. Avoid cough medicines that have more than one active ingredient. Read and follow all instructions on the label. · Do not smoke. Smoking can make bronchitis worse. If you need help quitting, talk to your doctor about stop-smoking programs and medicines. These can increase your chances of quitting for good. When should you call for help? Call 911 anytime you think you may need emergency care. For example, call if:    · You have severe trouble breathing.    Call your doctor now or

## 2021-11-03 NOTE — PROGRESS NOTES
Infectious disease Consult Note      Patient: Kelley Jacobs  : 1940  Acct#:  [de-identified]     Date:  11/3/2021    Subjective:       History of Present Illness  Patient is a [de-identified] y.o.  female   Chief Complaint   Patient presents with    New Patient     yeast infection   The patient was referred by Dr. Amy Garg for recurrent UTIs, several infections in the last year, most recent was on 10/18/2021 urine culture grew yeast was treated with Diflucan. She is on estradiol cream and cranberry pills. The patient had urinary retention does straight cath for years. She is on Myrbetriq    She is feeling well today, denied burning with urination, no bladder spasm or flank pain, no hematuria, no fever or chills. She is complaining of dry cough and shortness of breath for the last 4 days, no nasal congestion, no postnasal drips, no other complaints. The patient was started on Macrobid that she has been taking for the last 2 months  She had multiple antibiotics allergy include sulfa, cephalexin, penicillin, clindamycin and Zithromax. Previous urine culture on 2021, 2021 and 2021 grew yeast, not Candida albicans or Candida DUBLINIENSIS  Urine culture on 3 /29/21 and 2021 grew E. Coli  History of chronic ITP, CVA, hypertension, hyperlipidemia, diabetes mellitus, diabetic gastroparesis. Interstim sacral neuromodulation device  please placed 12/10/2013 removed 21 status post Botox 300 IU 2021. She had cystoscopy done  Past Medical History:   Diagnosis Date    Age-related cognitive decline     Ankle pain     Left ankle fracture in ortho boat.     Anxiety     B12 deficiency     Winters esophagus     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[facility] [address]     left with urinary incontinenc post surgical    Caffeine use     2 coffee/day, 1-2 tea per week    Chronic back pain     Chronic ITP (idiopathic thrombocytopenia) (HCC)     CVA (cerebral infarction) ,     balance issues, short term memory loss    Diabetic gastroparesis ([facility] [address]     Diverticular disease 1986    GERD (gastroesophageal reflux disease)     Hiatal hernia     Hip fracture (HCC)     History of blood transfusion     History of decreased platelet count     Hyperlipemia     Hypertension     Internal hemorrhoid     Macular degeneration of left eye 1980's    S/P laser treatment    Nausea and vomiting     Neuropathy     Osteoarthritis     Ringing in ears     Self-catheterizes urinary bladder     6-7 times daily    TIA (transient ischemic attack) 2000    x1    Tubular adenoma     colon polyps    Type II or unspecified type diabetes mellitus without mention of complication, not stated as uncontrolled     Urinary incontinence     frequent UTI s/p infection, surgical dilatation lead to incontinence    Wears dentures     full on top, partial on bottom    Wears glasses       Past Surgical History:   Procedure Laterality Date    APPENDECTOMY  1962    BLADDER SURGERY      bladder stimulator    BLADDER SUSPENSION  2002    multiple    CARDIAC CATHETERIZATION  2008    no stents    CARDIOVASCULAR STRESS TEST  12/2014    CATARACT REMOVAL WITH IMPLANT Left 11/07/2017    [facility]    CATARACT REMOVAL WITH IMPLANT Right 11/28/2017    [facility]    COLON SURGERY      colostomy reversal    COLONOSCOPY  04/07/2016    tubular adenoma x3; internal hemorrhoids    COLONOSCOPY N/A 11/06/2019    COLONOSCOPY DIAGNOSTIC performed by Gregor Rutherford MD at [facility]  11/06/2019    COLOSTOMY  1986    bowel obstruction/ rupture from diverticular disease, reversal 3 mos later    CYSTOSCOPY  09/08/2017    W/ 200IU Botox     FRACTURE SURGERY Right 2011    hip    FRACTURE SURGERY Left 2001    WRIST    FRACTURE SURGERY Left 2013    ankle    HERNIA REPAIR      HYSTERECTOMY  1969    JOINT REPLACEMENT Bilateral 2000    BILAT KNEES    LUMBAR FUSION  2017    L2/L3    SC XCAPSL CTRC RMVL INSJ IO LENS PROSTH W/O ECP Left 11/07/2017    EYE CATARACT EMULSIFICATION IOL IMPLANT performed by Prudence Christianson MD at [address] W/O ECP Right 11/28/2017    EYE CATARACT EMULSIFICATION IOL IMPLANT performed by Prudence Christianson MD at [facility] Right 10/27/2015    WITH POLY EXCHANGE BIOMET AND GPS APPLICATION    REVISION TOTAL KNEE ARTHROPLASTY Left 07/14/2020    KNEE TOTAL ARTHROPLASTY REVISION - POLY EXCHANGE performed by Mercedes Gomez MD at [address]  2010    fusion, did not take   [address]    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    NEAL AND BSO      TONSILLECTOMY  1978    UPPER GASTROINTESTINAL ENDOSCOPY      UPPER GASTROINTESTINAL ENDOSCOPY  05/05/2014    UPPER GASTROINTESTINAL ENDOSCOPY  04/07/2016    mild gastritis    UPPER GASTROINTESTINAL ENDOSCOPY N/A 07/17/2018    retained thick secretions in proximal esophagus          Admission Meds  Current Outpatient Medications on File Prior to Visit   Medication Sig Dispense Refill    fluconazole (DIFLUCAN) 100 MG tablet Take 1 tablet by mouth daily 10 tablet 0    LIVALO 2 MG TABS tablet Take 1 tablet by mouth nightly 30 tablet 3    nystatin-triamcinolone (MYCOLOG II) 026608-3.1 UNIT/GM-% cream Apply topically 4 times daily Apply topically 4 times daily.  30 g 3    lubiprostone (AMITIZA) 8 MCG CAPS capsule Take 1 capsule by mouth daily 30 capsule 1    nitrofurantoin, macrocrystal-monohydrate, (MACROBID) 100 MG capsule Take 1 capsule by mouth daily 30 capsule 11    irbesartan (AVAPRO) 75 MG tablet TAKE 1 TABLET BY MOUTH ONE TIME A DAY  30 tablet 5    lidocaine (XYLOCAINE) 2 % jelly APPLY TOPICALLY AS NEEDED      Wheat Dextrin (BENEFIBER DRINK MIX PO) Take by mouth 2 tablespoons every night       benzonatate (TESSALON) 200 MG capsule Take 1 capsule by mouth as needed      albuterol (PROVENTIL) (5 MG/ML) 0.5% nebulizer solution Inhale 0.5 mLs into the lungs as needed       nystatin (MYCOSTATIN) 198413 UNIT/GM powder Apply 3 times daily. 45 g 3    esomeprazole (NEXIUM) 40 MG delayed release capsule Take 1 capsule by mouth every morning (before breakfast) 90 capsule 3    hydrocortisone (ANUSOL-HC) 2.5 % CREA rectal cream Place rectally 2 times daily 28 g 3    vitamin B-12 (CYANOCOBALAMIN) 1000 MCG tablet Take 1 tablet by mouth once a week 30 tablet 3    aspirin 81 MG EC tablet Take 1 tablet by mouth 2 times daily 30 tablet 3    rOPINIRole (REQUIP) 1 MG tablet Take by mouth 2 times daily       glipiZIDE (GLUCOTROL) 5 MG tablet 2.5mg in the am and 2.5 mg in the pm      Continuous Blood Gluc Sensor (FREESTYLE DAVIS 14 DAY SENSOR) MISC       Multiple Vitamins-Minerals (THERAPEUTIC MULTIVITAMIN-MINERALS) tablet Take 1 tablet by mouth daily       Multiple Vitamins-Minerals (PRESERVISION AREDS PO) Take by mouth daily       Continuous Blood Gluc  (FREESTYLE DAVIS 14 DAY READER) MATTHEW       donepezil (ARICEPT) 10 MG tablet Take 10 mg by mouth nightly       estradiol (ESTRACE VAGINAL) 0.1 MG/GM vaginal cream Place 1 g vaginally Twice a Week (Patient taking differently: Place 1 g vaginally every 14 days ) 1 Tube 5    CRANBERRY PO Take by mouth 2 times daily       Current Facility-Administered Medications on File Prior to Visit   Medication Dose Route Frequency Provider Last Rate Last Admin    0.9 % sodium chloride infusion 250 mL  250 mL IntraVENous Once Maria Guadalupe Hale MD               Allergies  Allergies   Allergen Reactions    Iodides Anaphylaxis, Hives and Itching     \"Contrast dyes\"  This was added by someone but Patient states has had IVP dyes multiple times without problems and had a myelogram in Dec 2016 without problems    Povidone-Iodine Anaphylaxis, Hives and Swelling    Sulfa Antibiotics Hives and Swelling     Other reaction(s): Unknown  Other reaction(s):  Intolerance-unknown  Hands, feet, mouth, eyes  Other reaction(s): Unknown    Betadine [Povidone Iodine] Hives    Cephalexin Hives and Swelling    Cephalexin Itching     Other reaction(s): Hallucinations  In  received demerol and keflex together so was told to list both as allergies    Cozaar [Losartan] Nausea Only     Pt also gets sores on toungue    Cymbalta [Duloxetine Hcl] Diarrhea and Nausea And Vomiting     Weakness    Demerol Hives and Swelling    Doxycycline Other (See Comments)     Sore mouth        Meperidine Itching     Other reaction(s): Hallucinations      Morphine Hives and Itching    Penicillins Hives, Swelling and Itching     Other reaction(s): Intolerance-unknown  Other reaction(s): Unknown    Zithromax [Azithromycin] Other (See Comments)     Sore mouth      Clindamycin/Lincomycin     Etodolac     Fluorescein     Iodine      Other reaction(s): Intolerance-unknown    Lisinopril      cough    Penicillin G     Soap     Toviaz [Fesoterodine Fumarate Er]     Clonazepam Other (See Comments)     From neuro: made her too sleepy    Metformin And Related Nausea And Vomiting     Diarrhea and N/V        Social   Social History     Tobacco Use    Smoking status: Former Smoker     Packs/day: 0.50     Years: 20.00     Pack years: 10.00     Quit date: 1982     Years since quittin.4    Smokeless tobacco: Former User     Quit date: 1982   Substance Use Topics    Alcohol use:  No             Family History   Problem Relation Age of Onset    Breast Cancer Mother     Cancer Mother         breast and uterine  39    Heart Disease Father     Heart Attack Father          28   [address] [facility] Maternal Grandmother     Cancer Brother 46        bronchogenic adenocarcinoma cancer    Lung Cancer Brother     Cancer Sister         lung    Lung Cancer Sister          72    Breast Cancer Sister          62    Cancer Sister         breast          Review of Systems    Other than above 12 systems reviewed were negative . Physical Exam  /72   Pulse 53   Temp 97.2 °F (36.2 °C)   Resp 15   Ht 4' 11\" (1.499 m)   Wt 158 lb (71.7 kg)   SpO2 92%   BMI 31.91 kg/m²           General Appearance: alert and oriented to person, place and time, well-developed and well-nourished, in no acute distress  Skin: warm and dry, no rash or erythema  Head: normocephalic and atraumatic  Eyes: pupils equal, round, and reactive to light, extraocular eye movements intact, conjunctivae normal  ENT: hearing grossly normal bilaterally. Neck: neck supple and non tender . Pulmonary/Chest: clear to auscultation bilaterally- no wheezes, rales or rhonchi, normal air movement, no respiratory distress  Cardiovascular: normal rate, regular rhythm, normal S1 and S2, no murmurs.   Abdomen: soft, non-tender, non-distended, normal bowel sounds, no masses or organomegaly  Extremities: no cyanosis, clubbing or edema  Musculoskeletal: normal range of motion, no joint swelling, deformity or tenderness  Neurologic: no cranial nerve deficit and muscle strength normal    Data Review:    WBC   Date Value Ref Range Status   07/28/2021 6.7 3.5 - 11.3 k/uL Final   12/10/2020 7.6 3.5 - 11.3 k/uL Final   06/30/2020 5.1 3.5 - 11.0 k/uL Final     Hemoglobin   Date Value Ref Range Status   07/28/2021 12.9 11.9 - 15.1 g/dL Final   12/10/2020 13.7 11.9 - 15.1 g/dL Final   06/30/2020 13.9 12.0 - 16.0 g/dL Final     Hematocrit   Date Value Ref Range Status   07/28/2021 41.9 36.3 - 47.1 % Final   12/10/2020 44.2 36.3 - 47.1 % Final   06/30/2020 42.7 36 - 46 % Final     MCV   Date Value Ref Range Status   07/28/2021 96.8 82.6 - 102.9 fL Final   12/10/2020 95.3 82.6 - 102.9 fL Final   06/30/2020 88.8 80 - 100 fL Final     Platelets   Date Value Ref Range Status   07/28/2021 See Reflexed IPF Result 138 - 453 k/uL Final   12/10/2020 See Reflexed IPF Result 138 - 453 k/uL Final   06/30/2020 95 (L) 150 - 450 k/uL Final     Sodium   Date Value Ref Range Status   07/28/2021 140 135 - 144 mmol/L Final   12/10/2020 142 135 - 144 mmol/L Final   06/30/2020 139 135 - 144 mmol/L Final     Potassium   Date Value Ref Range Status   07/28/2021 4.6 3.7 - 5.3 mmol/L Final   12/10/2020 4.0 3.7 - 5.3 mmol/L Final   06/30/2020 4.4 3.7 - 5.3 mmol/L Final     Chloride   Date Value Ref Range Status   07/28/2021 107 98 - 107 mmol/L Final   12/10/2020 108 (H) 98 - 107 mmol/L Final   06/30/2020 104 98 - 107 mmol/L Final     CO2   Date Value Ref Range Status   07/28/2021 20 20 - 31 mmol/L Final   12/10/2020 17 (L) 20 - 31 mmol/L Final   06/30/2020 22 20 - 31 mmol/L Final     Phosphorus   Date Value Ref Range Status   08/24/2018 3.5 2.6 - 4.5 mg/dL Final     BUN   Date Value Ref Range Status   07/28/2021 22 8 - 23 mg/dL Final   12/10/2020 17 8 - 23 mg/dL Final   06/30/2020 20 8 - 23 mg/dL Final     CREATININE   Date Value Ref Range Status   07/28/2021 0.70 0.50 - 0.90 mg/dL Final   12/10/2020 0.77 0.50 - 0.90 mg/dL Final   06/30/2020 0.75 0.50 - 0.90 mg/dL Final     AST   Date Value Ref Range Status   07/28/2021 17 <32 U/L Final   12/10/2020 19 <32 U/L Final   08/09/2019 18 <32 U/L Final     ALT   Date Value Ref Range Status   07/28/2021 18 5 - 33 U/L Final   12/10/2020 18 5 - 33 U/L Final   08/09/2019 14 5 - 33 U/L Final     Total Bilirubin   Date Value Ref Range Status   07/28/2021 0.58 0.3 - 1.2 mg/dL Final   12/10/2020 0.62 0.3 - 1.2 mg/dL Final   08/09/2019 0.34 0.3 - 1.2 mg/dL Final     Alkaline Phosphatase   Date Value Ref Range Status   07/28/2021 70 35 - 104 U/L Final   12/10/2020 73 35 - 104 U/L Final   08/09/2019 62 35 - 104 U/L Final     Lipase   Date Value Ref Range Status   01/06/2017 41 13 - 60 U/L Final     Comment:     [facility] [address] [facility], [address] [place] [address] [phone]   03/12/2016 15 13 - 60 U/L Final     Comment:     Performed at [address], [address] [place] [address]   [phone]       Amylase   Date Value Ref Range Status   01/06/2017 47 28 - 100 U/L Final     Comment:     [facility] [address] [facility], [address] [place] [address] [phone]   03/12/2016 18 (L) 28 - 100 U/L Final     Comment:     Performed at [facility] [address]   [phone]       Protime   Date Value Ref Range Status   02/21/2017 10.8 9.7 - 12.0 sec Final   12/15/2016 10.7 9.7 - 12.0 sec Final     INR   Date Value Ref Range Status   02/21/2017 1.0  Final     Comment:           Non-therapeutic Range:     INR = 0.9-1.2  Therapeutic Range: Moderate Anticoagulant Intensity:     INR = 2.0-3.0   High Anticoagulant Intensity:     INR = 2.5-3.5        Performed at [place] [address] [address]   [phone]     12/15/2016 1.0  Final     Comment:           Non-therapeutic Range:     INR = 0.9-1.2  Therapeutic Range: Moderate Anticoagulant Intensity:     INR = 2.0-3.0   High Anticoagulant Intensity:     INR = 2.5-3.5        Performed at [address] [place] [address]   [phone]       No results found for: PTT  No results found for: OCCULTBLD  No results found for: GLUMET     Imaging Studies:                           All appropriate imaging studies and reports reviewed: Yes  No results found. Assessment:      Diagnosis Orders   1. Cough  Respiratory Panel, Molecular, with COVID-19    XR CHEST STANDARD (2 VW)   Recurrent UTIs  History of chronic ITP, CVA, hypertension, hyperlipidemia, diabetes mellitus, diabetic gastroparesis. Recommendations:   Continue Macrobid, cranberry pills and Estrace. D-mannose  Currently asymptomatic, was advised to call when she is having urinary symptoms for UA and culture treat as needed. Chest x-ray  Respiratory panel with COVID-19 PCR  The patient had a follow-up with Dr. Mary Bernal later today. Follow-up in 3 months and as needed.   Orders Placed This Encounter   Procedures    Respiratory Panel, Molecular, with COVID-19     Standing Status:   Future     Standing Expiration Date:   11/3/2022     Order Specific Question:   Is this test for diagnosis or screening? Answer:   Diagnosis of ill patient     Order Specific Question:   Symptomatic for COVID-19 as defined by CDC? Answer:   Yes     Order Specific Question:   Date of Symptom Onset     Answer:   11/1/2021     Order Specific Question:   Hospitalized for COVID-19? Answer:   No     Order Specific Question:   Admitted to ICU for COVID-19? Answer:   No     Order Specific Question:   Employed in healthcare setting? Answer:   No     Order Specific Question:   Resident in a congregate (group) care setting? Answer:   No     Order Specific Question:   Pregnant? Answer:   No     Order Specific Question:   Previously tested for COVID-19? Answer: Yes    XR CHEST STANDARD (2 VW)     Standing Status:   Future     Number of Occurrences:   1     Standing Expiration Date:   11/3/2022     Order Specific Question:   Reason for exam:     Answer:   cough         Thank you for allowing me to participate in the care of your patient. Please feel free to contact me with any questions or concerns.      Nicole Huddleston MD

## 2021-11-05 ENCOUNTER — TELEPHONE (OUTPATIENT)
Dept: INFECTIOUS DISEASES | Age: 81
End: 2021-11-05

## 2021-11-05 NOTE — TELEPHONE ENCOUNTER
Pt calling office to inform you of Rx she's taking - Myrbetrig 50 mg     Also her PCP filled Zpak and it's causing yeast, she is going to D/C -  I advised her to call that doctor to inform.

## 2021-11-16 ENCOUNTER — TELEPHONE (OUTPATIENT)
Dept: INFECTIOUS DISEASES | Age: 81
End: 2021-11-16

## 2021-11-16 NOTE — TELEPHONE ENCOUNTER
Pt has yeast infection due to Corie Sterlingsteph her PCP gave her. Pt called 10 days ago and stated she was going to contact PCP. Pt calling today because she hasn't had any luck getting any help. Her bottom is very raw. She will f/u with Urology regarding her problems holding her urine, however is looking for something for her yeast-  Please advise. Scheduled Dec f/u with you. Sent perfect serve     11/17/21 verbal ok to phone in below pending RX   Phoned into [address] [place] [address] Informed pt.        Dr Nata Arnold- please sign pending Rx's

## 2021-11-17 RX ORDER — FLUCONAZOLE 150 MG/1
150 TABLET ORAL ONCE
Qty: 1 TABLET | Refills: 0 | Status: CANCELLED | OUTPATIENT
Start: 2021-11-17 | End: 2021-11-17

## 2021-11-17 RX ORDER — NYSTATIN 100000 [USP'U]/G
POWDER TOPICAL
Qty: 30 G | Refills: 0 | Status: CANCELLED | OUTPATIENT
Start: 2021-11-17

## 2021-11-29 ENCOUNTER — OFFICE VISIT (OUTPATIENT)
Dept: PRIMARY CARE CLINIC | Age: 81
End: 2021-11-29
Payer: MEDICARE

## 2021-11-29 ENCOUNTER — HOSPITAL ENCOUNTER (OUTPATIENT)
Age: 81
Setting detail: SPECIMEN
Discharge: HOME | End: 2021-11-29

## 2021-11-29 VITALS
WEIGHT: 159.8 LBS | OXYGEN SATURATION: 99 % | SYSTOLIC BLOOD PRESSURE: 120 MMHG | HEIGHT: 59 IN | TEMPERATURE: 97.7 F | HEART RATE: 69 BPM | BODY MASS INDEX: 32.21 KG/M2 | DIASTOLIC BLOOD PRESSURE: 76 MMHG

## 2021-11-29 DIAGNOSIS — R30.0 DYSURIA: ICD-10-CM

## 2021-11-29 DIAGNOSIS — J98.9 RESPIRATORY ILLNESS: ICD-10-CM

## 2021-11-29 DIAGNOSIS — J98.9 RESPIRATORY ILLNESS: Primary | ICD-10-CM

## 2021-11-29 DIAGNOSIS — T36.95XA ANTIBIOTIC-INDUCED YEAST INFECTION: ICD-10-CM

## 2021-11-29 DIAGNOSIS — B37.9 ANTIBIOTIC-INDUCED YEAST INFECTION: ICD-10-CM

## 2021-11-29 PROCEDURE — 4040F PNEUMOC VAC/ADMIN/RCVD: CPT | Performed by: PHYSICIAN ASSISTANT

## 2021-11-29 PROCEDURE — G8417 CALC BMI ABV UP PARAM F/U: HCPCS | Performed by: PHYSICIAN ASSISTANT

## 2021-11-29 PROCEDURE — G8484 FLU IMMUNIZE NO ADMIN: HCPCS | Performed by: PHYSICIAN ASSISTANT

## 2021-11-29 PROCEDURE — 99214 OFFICE O/P EST MOD 30 MIN: CPT | Performed by: PHYSICIAN ASSISTANT

## 2021-11-29 PROCEDURE — 1123F ACP DISCUSS/DSCN MKR DOCD: CPT | Performed by: PHYSICIAN ASSISTANT

## 2021-11-29 PROCEDURE — G8427 DOCREV CUR MEDS BY ELIG CLIN: HCPCS | Performed by: PHYSICIAN ASSISTANT

## 2021-11-29 PROCEDURE — 1090F PRES/ABSN URINE INCON ASSESS: CPT | Performed by: PHYSICIAN ASSISTANT

## 2021-11-29 PROCEDURE — 1036F TOBACCO NON-USER: CPT | Performed by: PHYSICIAN ASSISTANT

## 2021-11-29 PROCEDURE — G8400 PT W/DXA NO RESULTS DOC: HCPCS | Performed by: PHYSICIAN ASSISTANT

## 2021-11-29 RX ORDER — PRIMIDONE 50 MG/1
50 TABLET ORAL 2 TIMES DAILY
COMMUNITY
Start: 2021-11-02

## 2021-11-29 RX ORDER — LEVOFLOXACIN 500 MG/1
500 TABLET, FILM COATED ORAL DAILY
Qty: 10 TABLET | Refills: 0 | Status: SHIPPED | OUTPATIENT
Start: 2021-11-29 | End: 2021-12-09

## 2021-11-29 RX ORDER — FLUCONAZOLE 150 MG/1
150 TABLET ORAL DAILY
Qty: 7 TABLET | Refills: 0 | Status: SHIPPED | OUTPATIENT
Start: 2021-11-29 | End: 2022-08-26

## 2021-11-29 RX ORDER — BENZONATATE 200 MG/1
200 CAPSULE ORAL 3 TIMES DAILY PRN
Qty: 30 CAPSULE | Refills: 0 | Status: SHIPPED | OUTPATIENT
Start: 2021-11-29 | End: 2022-08-25

## 2021-11-29 ASSESSMENT — ENCOUNTER SYMPTOMS: SORE THROAT: 1

## 2021-11-29 NOTE — PROGRESS NOTES
08/03/2021 6.9 (H)     TSH (mIU/L)   Date Value   07/28/2021 1.98     BP Readings from Last 3 Encounters:   11/29/21 120/76   11/03/21 120/68   11/03/21 136/72          (goal 120/80)    Past Medical History:   Diagnosis Date    Age-related cognitive decline     Ankle pain     Left ankle fracture in ortho boat.    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    left with urinary incontinenc post surgical    Caffeine use     2 coffee/day, 1-2 tea per week    Chronic back pain     Chronic ITP (idiopathic thrombocytopenia) (HCC)     CVA (cerebral infarction) 2008, 2010    balance issues, short term memory loss    Diabetic gastroparesis ([address]     Diverticular disease 1986    GERD (gastroesophageal reflux disease)     Hiatal hernia     Hip fracture ([address]     History of blood transfusion     History of decreased platelet count     Hyperlipemia     Hypertension     Internal hemorrhoid     Macular degeneration of left eye 1980's    S/P laser treatment    Nausea and vomiting     Neuropathy     Osteoarthritis     Ringing in ears     Self-catheterizes urinary bladder     6-7 times daily    TIA (transient ischemic attack) 2000    x1    Tubular adenoma     colon polyps    Type II or unspecified type diabetes mellitus without mention of complication, not stated as uncontrolled     Urinary incontinence     frequent UTI s/p infection, surgical dilatation lead to incontinence    Wears dentures     full on top, partial on bottom    Wears glasses       Past Surgical History:   Procedure Laterality Date    APPENDECTOMY  1962    BLADDER SURGERY      bladder stimulator    BLADDER SUSPENSION  2002    multiple    CARDIAC CATHETERIZATION  2008    no stents    CARDIOVASCULAR STRESS TEST  12/2014    CATARACT REMOVAL WITH IMPLANT Left 11/07/2017    Raffoalhaji/Sulaiman    CATARACT REMOVAL WITH IMPLANT Right 11/28/2017    Raffoalhaji/Sulaiman    COLON SURGERY      colostomy reversal    COLONOSCOPY  2016    tubular adenoma x3; internal hemorrhoids    COLONOSCOPY N/A 2019    COLONOSCOPY DIAGNOSTIC performed by Shira Rodarte MD at [facility]  2019    COLOSTOMY  1986    bowel obstruction/ rupture from diverticular disease, reversal 3 mos later    CYSTOSCOPY  2017    W/ 200IU Botox     FRACTURE SURGERY Right 2011    hip    FRACTURE SURGERY Left     WRIST    FRACTURE SURGERY Left 2013    ankle    HERNIA REPAIR      HYSTERECTOMY  1969    JOINT REPLACEMENT Bilateral 2000    BILAT KNEES    LUMBAR FUSION  2017    L2/L3    TX XCAPSL CTRC RMVL INSJ IO LENS PROSTH W/O ECP Left 2017    EYE CATARACT EMULSIFICATION IOL IMPLANT performed by Jeremi Ward MD at [address] [place] [address] W/O ECP Right 2017    EYE CATARACT EMULSIFICATION IOL IMPLANT performed by Jeremi Ward MD at [facility] Right 10/27/2015    WITH POLY EXCHANGE BIOMET AND GPS APPLICATION    REVISION TOTAL KNEE ARTHROPLASTY Left 2020    KNEE TOTAL ARTHROPLASTY REVISION - POLY EXCHANGE performed by Stephania Estevez MD at [facility]      fusion, did not take   [address]    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    NEAL AND BSO      TONSILLECTOMY      UPPER GASTROINTESTINAL ENDOSCOPY      UPPER GASTROINTESTINAL ENDOSCOPY  2014    UPPER GASTROINTESTINAL ENDOSCOPY  2016    mild gastritis    UPPER GASTROINTESTINAL ENDOSCOPY N/A 2018    retained thick secretions in proximal esophagus       Family History   Problem Relation Age of Onset    Breast Cancer Mother     Cancer Mother         breast and uterine  39    Heart Disease Father     Heart Attack Father          28    Cancer Maternal Grandmother     Cancer Brother 46        bronchogenic adenocarcinoma cancer    Lung Cancer Brother     Cancer Sister         lung    Lung Cancer Sister          72    Breast Cancer Sister          62    Cancer Sister         breast       Social History     Tobacco Use    Smoking status: Former Smoker     Packs/day: 0.50     Years: 20.00     Pack years: 10.00     Quit date: 1982     Years since quittin.5    Smokeless tobacco: Former User     Quit date: 1982   Substance Use Topics    Alcohol use: No      Current Outpatient Medications   Medication Sig Dispense Refill    primidone (MYSOLINE) 50 MG tablet Take 50 mg by mouth 2 times daily      benzonatate (TESSALON) 200 MG capsule Take 1 capsule by mouth 3 times daily as needed for Cough 30 capsule 0    levoFLOXacin (LEVAQUIN) 500 MG tablet Take 1 tablet by mouth daily for 10 days 10 tablet 0    fluconazole (DIFLUCAN) 150 MG tablet Take 1 tablet by mouth daily 7 tablet 0    D-Mannose 350 MG CAPS Take 300 mg by mouth daily 30 capsule 3    LIVALO 2 MG TABS tablet Take 1 tablet by mouth nightly 30 tablet 3    nystatin-triamcinolone (MYCOLOG II) 669834-3.1 UNIT/GM-% cream Apply topically 4 times daily Apply topically 4 times daily. 30 g 3    lubiprostone (AMITIZA) 8 MCG CAPS capsule Take 1 capsule by mouth daily 30 capsule 1    nitrofurantoin, macrocrystal-monohydrate, (MACROBID) 100 MG capsule Take 1 capsule by mouth daily 30 capsule 11    irbesartan (AVAPRO) 75 MG tablet TAKE 1 TABLET BY MOUTH ONE TIME A DAY  30 tablet 5    lidocaine (XYLOCAINE) 2 % jelly APPLY TOPICALLY AS NEEDED      Wheat Dextrin (BENEFIBER DRINK MIX PO) Take by mouth 2 tablespoons every night       albuterol (PROVENTIL) (5 MG/ML) 0.5% nebulizer solution Inhale 0.5 mLs into the lungs as needed       nystatin (MYCOSTATIN) 868593 UNIT/GM powder Apply 3 times daily.  45 g 3    esomeprazole (NEXIUM) 40 MG delayed release capsule Take 1 capsule by mouth every morning (before breakfast) 90 capsule 3    hydrocortisone (ANUSOL-HC) 2.5 % CREA rectal cream Place rectally 2 times daily 28 g 3    vitamin B-12 (CYANOCOBALAMIN) 1000 MCG tablet Take 1 tablet by mouth once a week 30 tablet 3    aspirin 81 MG EC tablet Take 1 tablet by mouth 2 times daily 30 tablet 3    rOPINIRole (REQUIP) 1 MG tablet Take by mouth 2 times daily       glipiZIDE (GLUCOTROL) 5 MG tablet 2.5mg in the am and 2.5 mg in the pm      Continuous Blood Gluc Sensor (FREESTYLE DAVIS 14 DAY SENSOR) MISC       Multiple Vitamins-Minerals (THERAPEUTIC MULTIVITAMIN-MINERALS) tablet Take 1 tablet by mouth daily       Multiple Vitamins-Minerals (PRESERVISION AREDS PO) Take by mouth daily       Continuous Blood Gluc  (FREESTYLE DAVIS 14 DAY READER) MATTHEW       estradiol (ESTRACE VAGINAL) 0.1 MG/GM vaginal cream Place 1 g vaginally Twice a Week (Patient taking differently: Place 1 g vaginally every 14 days ) 1 Tube 5    CRANBERRY PO Take by mouth 2 times daily      donepezil (ARICEPT) 10 MG tablet Take 10 mg by mouth nightly  (Patient not taking: Reported on 11/29/2021)       No current facility-administered medications for this visit. Facility-Administered Medications Ordered in Other Visits   Medication Dose Route Frequency Provider Last Rate Last Admin    0.9 % sodium chloride infusion 250 mL  250 mL IntraVENous Once Kyle Dale MD         Allergies   Allergen Reactions    Iodides Anaphylaxis, Hives and Itching     \"Contrast dyes\"  This was added by someone but Patient states has had IVP dyes multiple times without problems and had a myelogram in Dec 2016 without problems    Povidone-Iodine Anaphylaxis, Hives and Swelling    Sulfa Antibiotics Hives and Swelling     Other reaction(s): Unknown  Other reaction(s):  Intolerance-unknown  Hands, feet, mouth, eyes  Other reaction(s): Unknown    Betadine [Povidone Iodine] Hives    Cephalexin Hives and Swelling    Cephalexin Itching     Other reaction(s): Hallucinations  In 1969 received demerol and keflex together so was note reviewed. Constitutional:       Appearance: Normal appearance. HENT:      Head: Normocephalic and atraumatic. Right Ear: Tympanic membrane, ear canal and external ear normal.      Left Ear: Tympanic membrane, ear canal and external ear normal.      Nose:      Right Sinus: No maxillary sinus tenderness or frontal sinus tenderness. Left Sinus: No maxillary sinus tenderness or frontal sinus tenderness. Mouth/Throat:      Pharynx: No oropharyngeal exudate. Cardiovascular:      Rate and Rhythm: Normal rate and regular rhythm. Pulmonary:      Effort: Pulmonary effort is normal. No respiratory distress. Breath sounds: Wheezing present. Neurological:      Mental Status: She is alert. Assessment/Plan:   1. Respiratory illness  -     COVID-19; Future: will recheck for COVID since pt said cough worsened on Saturday. -     benzonatate (TESSALON) 200 MG capsule; Take 1 capsule by mouth 3 times daily as needed for Cough, Disp-30 capsule, R-0Normal  -  Pt said cipro is the only antibiotic she normally tolerates. Will try levaquin for persistent cough x 1 month. -   levoFLOXacin (LEVAQUIN) 500 MG tablet; Take 1 tablet by mouth daily for 10 days, Disp-10 tablet, R-0Normal  -Continue albuterol  2. Antibiotic-induced yeast infection  -     fluconazole (DIFLUCAN) 150 MG tablet; Take 1 tablet by mouth daily, Disp-7 tablet, R-0Normal (This does not usually work for pt, but she has follow up with infectious disease on Wed 12/1, so they may switch her to something then when urine culture is back). 3. Dysuria  -     levoFLOXacin (LEVAQUIN) 500 MG tablet; Take 1 tablet by mouth daily for 10 days, Disp-10 tablet, R-0Normal  -     fluconazole (DIFLUCAN) 150 MG tablet; Take 1 tablet by mouth daily, Disp-7 tablet, R-0Normal  -     Culture, Urine; Future  -     Urinalysis With Microscopic; Future: Unable to run POCT UA in office because urine too cloudy.      Return if symptoms worsen or fail to improve, for Has appt with ID on Wednesday 12/1/21. Orders Placed This Encounter   Procedures    Culture, Urine     Standing Status:   Future     Number of Occurrences:   1     Standing Expiration Date:   11/29/2022     Order Specific Question:   Specify (ex-cath, midstream, cysto, etc)? Answer:   self cath    COVID-19     Standing Status:   Future     Standing Expiration Date:   11/29/2022     Scheduling Instructions:      1) Due to current limited availability of the COVID-19 test, tests will be prioritized based on responses to questions above. Testing may be delayed due to volume. 2) Print and instruct patient to adhere to CDC home isolation program. (Link Above)              3) Set up or refer patient for a monitoring program.              4) Have patient sign up for and leverage MyChart (if not previously done). Order Specific Question:   Is this test for diagnosis or screening? Answer:   Diagnosis of ill patient     Order Specific Question:   Symptomatic for COVID-19 as defined by CDC? Answer:   Yes     Order Specific Question:   Date of Symptom Onset     Answer:   11/27/2021     Order Specific Question:   Hospitalized for COVID-19? Answer:   No     Order Specific Question:   Admitted to ICU for COVID-19? Answer:   No     Order Specific Question:   Employed in healthcare setting? Answer:   No     Order Specific Question:   Resident in a congregate (group) care setting? Answer:   No     Order Specific Question:   Pregnant? Answer:   No     Order Specific Question:   Previously tested for COVID-19? Answer: Yes    Urinalysis With Microscopic     Standing Status:   Future     Number of Occurrences:   1     Standing Expiration Date:   11/29/2022     Order Specific Question:   SPECIFY(EX-CATH,MIDSTREAM,CYSTO,ETC)?      Answer:   self calf     Orders Placed This Encounter   Medications    benzonatate (TESSALON) 200 MG capsule     Sig: Take 1 capsule by mouth 3 times daily as needed for Cough     Dispense:  30 capsule     Refill:  0    levoFLOXacin (LEVAQUIN) 500 MG tablet     Sig: Take 1 tablet by mouth daily for 10 days     Dispense:  10 tablet     Refill:  0    fluconazole (DIFLUCAN) 150 MG tablet     Sig: Take 1 tablet by mouth daily     Dispense:  7 tablet     Refill:  0       Patient given educational materials - see patient instructions. Discussed use, benefit, and side effects of prescribed medications. All patient questions answered. Pt voiced understanding. Reviewed health maintenance. Instructed to continue current medications, diet and exercise. Patient agreed with treatment plan. Follow up as directed.      Electronically signed by Marion Young PA-C on 11/30/2021 at 11:57 AM

## 2021-11-30 LAB
-: ABNORMAL
AMORPHOUS: ABNORMAL
BACTERIA: ABNORMAL
BILIRUBIN URINE: NEGATIVE
CASTS UA: ABNORMAL /LPF (ref 0–2)
COLOR: YELLOW
CRYSTALS, UA: ABNORMAL /HPF
EPITHELIAL CELLS UA: ABNORMAL /HPF (ref 0–5)
GLUCOSE URINE: NEGATIVE
KETONES, URINE: NEGATIVE
LEUKOCYTE ESTERASE, URINE: ABNORMAL
MUCUS: ABNORMAL
NITRITE, URINE: NEGATIVE
OTHER OBSERVATIONS UA: ABNORMAL
PH UA: 5 (ref 5–8)
PROTEIN UA: ABNORMAL
RBC UA: ABNORMAL /HPF (ref 0–2)
RENAL EPITHELIAL, UA: ABNORMAL /HPF
SPECIFIC GRAVITY UA: 1.02 (ref 1–1.03)
TRICHOMONAS: ABNORMAL
TURBIDITY: ABNORMAL
URINE HGB: ABNORMAL
UROBILINOGEN, URINE: NORMAL
WBC UA: ABNORMAL /HPF (ref 0–5)
YEAST: ABNORMAL

## 2021-11-30 ASSESSMENT — ENCOUNTER SYMPTOMS
WHEEZING: 0
SHORTNESS OF BREATH: 1
COUGH: 1
VOMITING: 0
DIARRHEA: 0

## 2021-12-01 ENCOUNTER — HOSPITAL ENCOUNTER (OUTPATIENT)
Age: 81
Setting detail: SPECIMEN
Discharge: HOME | End: 2021-12-01

## 2021-12-01 ENCOUNTER — OFFICE VISIT (OUTPATIENT)
Dept: INFECTIOUS DISEASES | Age: 81
End: 2021-12-01
Payer: MEDICARE

## 2021-12-01 ENCOUNTER — HOSPITAL ENCOUNTER (OUTPATIENT)
Facility: CLINIC | Age: 81
Discharge: HOME | End: 2021-12-03
Payer: MEDICARE

## 2021-12-01 ENCOUNTER — HOSPITAL ENCOUNTER (OUTPATIENT)
Dept: GENERAL RADIOLOGY | Facility: CLINIC | Age: 81
Discharge: HOME | End: 2021-12-03
Payer: MEDICARE

## 2021-12-01 VITALS
BODY MASS INDEX: 32.05 KG/M2 | OXYGEN SATURATION: 93 % | DIASTOLIC BLOOD PRESSURE: 65 MMHG | RESPIRATION RATE: 15 BRPM | SYSTOLIC BLOOD PRESSURE: 98 MMHG | HEIGHT: 59 IN | HEART RATE: 90 BPM | TEMPERATURE: 97.1 F | WEIGHT: 159 LBS

## 2021-12-01 DIAGNOSIS — J18.9 PNEUMONIA DUE TO INFECTIOUS ORGANISM, UNSPECIFIED LATERALITY, UNSPECIFIED PART OF LUNG: Primary | ICD-10-CM

## 2021-12-01 DIAGNOSIS — J18.9 PNEUMONIA DUE TO INFECTIOUS ORGANISM, UNSPECIFIED LATERALITY, UNSPECIFIED PART OF LUNG: ICD-10-CM

## 2021-12-01 LAB
CULTURE: ABNORMAL
Lab: ABNORMAL
PROCALCITONIN: 0.17 NG/ML
SARS-COV-2: NORMAL
SARS-COV-2: NOT DETECTED
SOURCE: NORMAL
SPECIMEN DESCRIPTION: ABNORMAL

## 2021-12-01 PROCEDURE — 1090F PRES/ABSN URINE INCON ASSESS: CPT | Performed by: INTERNAL MEDICINE

## 2021-12-01 PROCEDURE — G8427 DOCREV CUR MEDS BY ELIG CLIN: HCPCS | Performed by: INTERNAL MEDICINE

## 2021-12-01 PROCEDURE — 4040F PNEUMOC VAC/ADMIN/RCVD: CPT | Performed by: INTERNAL MEDICINE

## 2021-12-01 PROCEDURE — G8484 FLU IMMUNIZE NO ADMIN: HCPCS | Performed by: INTERNAL MEDICINE

## 2021-12-01 PROCEDURE — G8417 CALC BMI ABV UP PARAM F/U: HCPCS | Performed by: INTERNAL MEDICINE

## 2021-12-01 PROCEDURE — 1123F ACP DISCUSS/DSCN MKR DOCD: CPT | Performed by: INTERNAL MEDICINE

## 2021-12-01 PROCEDURE — 71045 X-RAY EXAM CHEST 1 VIEW: CPT

## 2021-12-01 PROCEDURE — 1036F TOBACCO NON-USER: CPT | Performed by: INTERNAL MEDICINE

## 2021-12-01 PROCEDURE — 99214 OFFICE O/P EST MOD 30 MIN: CPT | Performed by: INTERNAL MEDICINE

## 2021-12-01 PROCEDURE — G8400 PT W/DXA NO RESULTS DOC: HCPCS | Performed by: INTERNAL MEDICINE

## 2021-12-01 NOTE — PROGRESS NOTES
Infectious disease Consult Note      Patient: Mary Carlin  : 1940  Acct#:  [de-identified]     Date:  2021    Subjective:       History of Present Illness  Patient is a [de-identified] y.o.  female   Chief Complaint   Patient presents with    Follow-up     yeast infection   The patient was referred by Dr. Teodoro Rodriguez for recurrent UTIs, several infections in the last year, most recent was on 10/18/2021 urine culture grew yeast was treated with Diflucan. She is on estradiol cream and cranberry pills. The patient had urinary retention does straight cath for years. She is on Myrbetriq    She is feeling well today, denied burning with urination, no bladder spasm or flank pain, no hematuria, no fever or chills. She is complaining of dry cough and shortness of breath for the last 4 days, no nasal congestion, no postnasal drips, no other complaints. The patient was started on Macrobid that she has been taking for the last 2 months  She had multiple antibiotics allergy include sulfa, cephalexin, penicillin, clindamycin and Zithromax. Previous urine culture on 2021, 2021 and 2021 grew yeast, not Candida albicans or Candida DUBLINIENSIS  Urine culture on 3 /29/21 and 2021 grew E. Coli  History of chronic ITP, CVA, hypertension, hyperlipidemia, diabetes mellitus, diabetic gastroparesis. Interstim sacral neuromodulation device  please placed 12/10/2013 removed 21 status post Botox 300 IU 2021.   She had cystoscopy done  Interval history 2021  She is complaining of dry cough for the last week, denies shortness of breath, denied fever or chills also complaining of dysuria and bladder spasm, no discharge  Urine culture on 2021 grew yeast  Covid test was done 2021 results not back yet  The patient was started on Diflucan and Levaquin by Dr. Alvin Hall started yesterday  She is up-to-date on Covid 19 vaccine and flu  Past Medical History:   Diagnosis Date    Age-related cognitive decline     Ankle pain     Left ankle fracture in ortho boat.    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    left with urinary incontinenc post surgical    Caffeine use     2 coffee/day, 1-2 tea per week    Chronic back pain     Chronic ITP (idiopathic thrombocytopenia) (HCC)     CVA (cerebral infarction) 2008, 2010    balance issues, short term memory loss    Diabetic gastroparesis ([address]     Diverticular disease 1986    GERD (gastroesophageal reflux disease)     Hiatal hernia     Hip fracture ([address]     History of blood transfusion     History of decreased platelet count     Hyperlipemia     Hypertension     Internal hemorrhoid     Macular degeneration of left eye 1980's    S/P laser treatment    Nausea and vomiting     Neuropathy     Osteoarthritis     Ringing in ears     Self-catheterizes urinary bladder     6-7 times daily    TIA (transient ischemic attack) 2000    x1    Tubular adenoma     colon polyps    Type II or unspecified type diabetes mellitus without mention of complication, not stated as uncontrolled     Urinary incontinence     frequent UTI s/p infection, surgical dilatation lead to incontinence    Wears dentures     full on top, partial on bottom    Wears glasses       Past Surgical History:   Procedure Laterality Date    APPENDECTOMY  1962    BLADDER SURGERY      bladder stimulator    BLADDER SUSPENSION  2002    multiple    CARDIAC CATHETERIZATION  2008    no stents    CARDIOVASCULAR STRESS TEST  12/2014    CATARACT REMOVAL WITH IMPLANT Left 11/07/2017    [facility]    CATARACT REMOVAL WITH IMPLANT Right 11/28/2017    [facility]affoalhaji/[facility]    COLON SURGERY      colostomy reversal    COLONOSCOPY  04/07/2016    tubular adenoma x3; internal hemorrhoids    COLONOSCOPY N/A 11/06/2019    COLONOSCOPY DIAGNOSTIC performed by Olu Gonsalves MD at [address] [place] [address]  11/06/2019    COLOSTOMY  1986    bowel obstruction/ rupture from diverticular disease, reversal 3 mos later    CYSTOSCOPY  09/08/2017    W/ 200IU Botox     FRACTURE SURGERY Right 2011    hip    FRACTURE SURGERY Left 2001    WRIST    FRACTURE SURGERY Left 2013    ankle    HERNIA REPAIR      HYSTERECTOMY  1969    JOINT REPLACEMENT Bilateral 2000    BILAT KNEES    LUMBAR FUSION  2017    L2/L3    UT XCAPSL CTRC RMVL INSJ IO LENS PROSTH W/O ECP Left 11/07/2017    EYE CATARACT EMULSIFICATION IOL IMPLANT performed by Queen Jennifer MD at [facility] W/O ECP Right 11/28/2017    EYE CATARACT EMULSIFICATION IOL IMPLANT performed by Queen Jennifer MD at [facility] Right 10/27/2015    WITH POLY EXCHANGE BIOMET AND GPS APPLICATION    REVISION TOTAL KNEE ARTHROPLASTY Left 07/14/2020    KNEE TOTAL ARTHROPLASTY REVISION - POLY EXCHANGE performed by Velia Amaral MD at [address] [facility]  2010    fusion, did not take   [address]    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    NEAL AND BSO      TONSILLECTOMY  1978    UPPER GASTROINTESTINAL ENDOSCOPY      UPPER GASTROINTESTINAL ENDOSCOPY  05/05/2014    UPPER GASTROINTESTINAL ENDOSCOPY  04/07/2016    mild gastritis    UPPER GASTROINTESTINAL ENDOSCOPY N/A 07/17/2018    retained thick secretions in proximal esophagus          Admission Meds  Current Outpatient Medications on File Prior to Visit   Medication Sig Dispense Refill    primidone (MYSOLINE) 50 MG tablet Take 50 mg by mouth 2 times daily      benzonatate (TESSALON) 200 MG capsule Take 1 capsule by mouth 3 times daily as needed for Cough 30 capsule 0    levoFLOXacin (LEVAQUIN) 500 MG tablet Take 1 tablet by mouth daily for 10 days 10 tablet 0    fluconazole (DIFLUCAN) 150 MG tablet Take 1 tablet by mouth daily 7 tablet 0    D-Mannose 350 MG CAPS Take 300 mg by mouth daily 30 capsule 3    LIVALO 2 MG TABS tablet Take 1 tablet by mouth nightly 30 tablet 3    nystatin-triamcinolone (MYCOLOG II) 962556-8.1 UNIT/GM-% cream Apply topically 4 times daily Apply topically 4 times daily. 30 g 3    lubiprostone (AMITIZA) 8 MCG CAPS capsule Take 1 capsule by mouth daily 30 capsule 1    nitrofurantoin, macrocrystal-monohydrate, (MACROBID) 100 MG capsule Take 1 capsule by mouth daily 30 capsule 11    irbesartan (AVAPRO) 75 MG tablet TAKE 1 TABLET BY MOUTH ONE TIME A DAY  30 tablet 5    lidocaine (XYLOCAINE) 2 % jelly APPLY TOPICALLY AS NEEDED      Wheat Dextrin (BENEFIBER DRINK MIX PO) Take by mouth 2 tablespoons every night       albuterol (PROVENTIL) (5 MG/ML) 0.5% nebulizer solution Inhale 0.5 mLs into the lungs as needed       nystatin (MYCOSTATIN) 384380 UNIT/GM powder Apply 3 times daily.  45 g 3    esomeprazole (NEXIUM) 40 MG delayed release capsule Take 1 capsule by mouth every morning (before breakfast) 90 capsule 3    hydrocortisone (ANUSOL-HC) 2.5 % CREA rectal cream Place rectally 2 times daily 28 g 3    vitamin B-12 (CYANOCOBALAMIN) 1000 MCG tablet Take 1 tablet by mouth once a week 30 tablet 3    aspirin 81 MG EC tablet Take 1 tablet by mouth 2 times daily 30 tablet 3    rOPINIRole (REQUIP) 1 MG tablet Take by mouth 2 times daily       glipiZIDE (GLUCOTROL) 5 MG tablet 2.5mg in the am and 2.5 mg in the pm      Continuous Blood Gluc Sensor (FREESTYLE DAVIS 14 DAY SENSOR) MISC       Multiple Vitamins-Minerals (THERAPEUTIC MULTIVITAMIN-MINERALS) tablet Take 1 tablet by mouth daily       Multiple Vitamins-Minerals (PRESERVISION AREDS PO) Take by mouth daily       Continuous Blood Gluc  (FREESTYLE DAVIS 14 DAY READER) MATTHEW       estradiol (ESTRACE VAGINAL) 0.1 MG/GM vaginal cream Place 1 g vaginally Twice a Week (Patient taking differently: Place 1 g vaginally every 14 days ) 1 Tube 5    CRANBERRY PO Take by mouth 2 times daily      donepezil (ARICEPT) 10 MG tablet Take 10 mg by mouth nightly  (Patient not taking: Reported on 12/1/2021)       Current Facility-Administered Medications on File Prior to Visit   Medication Dose Route Frequency Provider Last Rate Last Admin    0.9 % sodium chloride infusion 250 mL  250 mL IntraVENous Once Dashawn Gross MD               Allergies  Allergies   Allergen Reactions    Iodides Anaphylaxis, Hives and Itching     \"Contrast dyes\"  This was added by someone but Patient states has had IVP dyes multiple times without problems and had a myelogram in Dec 2016 without problems    Povidone-Iodine Anaphylaxis, Hives and Swelling    Sulfa Antibiotics Hives and Swelling     Other reaction(s): Unknown  Other reaction(s): Intolerance-unknown  Hands, feet, mouth, eyes  Other reaction(s): Unknown    Betadine [Povidone Iodine] Hives    Cephalexin Hives and Swelling    Cephalexin Itching     Other reaction(s): Hallucinations  In 1969 received demerol and keflex together so was told to list both as allergies    Cozaar [Losartan] Nausea Only     Pt also gets sores on toungue    Cymbalta [Duloxetine Hcl] Diarrhea and Nausea And Vomiting     Weakness    Demerol Hives and Swelling    Doxycycline Other (See Comments)     Sore mouth        Meperidine Itching     Other reaction(s): Hallucinations      Morphine Hives and Itching    Penicillins Hives, Swelling and Itching     Other reaction(s): Intolerance-unknown  Other reaction(s): Unknown    Zithromax [Azithromycin] Other (See Comments)     Sore mouth      Clindamycin/Lincomycin     Etodolac     Fluorescein     Iodine      Other reaction(s):  Intolerance-unknown    Lisinopril      cough    Penicillin G     Soap     Toviaz [Fesoterodine Fumarate Er]     Clonazepam Other (See Comments)     From neuro: made her too sleepy    Metformin And Related Nausea And Vomiting     Diarrhea and N/V        Social   Social History     Tobacco Use    Smoking status: Former Smoker     Packs/day: 0.50     Years: 20.00 Pack years: 10.00     Quit date: 1982     Years since quittin.5    Smokeless tobacco: Former User     Quit date: 1982   Substance Use Topics    Alcohol use: No             Family History   Problem Relation Age of Onset    Breast Cancer Mother     Cancer Mother         breast and uterine  39    Heart Disease Father     Heart Attack Father          28   [address] [facility] Maternal Grandmother     Cancer Brother 46        bronchogenic adenocarcinoma cancer    Lung Cancer Brother     Cancer Sister         lung    Lung Cancer Sister          72    Breast Cancer Sister          62    Cancer Sister         breast          Review of Systems    Other than above 12 systems reviewed were negative . Physical Exam  BP 98/65   Pulse 90   Temp 97.1 °F (36.2 °C)   Resp 15   Ht 4' 11\" (1.499 m)   Wt 159 lb (72.1 kg)   SpO2 93%   BMI 32.11 kg/m²           General Appearance: alert and oriented to person, place and time, well-developed and well-nourished, in no acute distress  Skin: warm and dry, no rash or erythema  Head: normocephalic and atraumatic  Eyes: pupils equal, round, and reactive to light, extraocular eye movements intact, conjunctivae normal  ENT: hearing grossly normal bilaterally. Neck: neck supple and non tender . Pulmonary/Chest: clear to auscultation bilaterally- no wheezes, rales or rhonchi, normal air movement, no respiratory distress  Cardiovascular: normal rate, regular rhythm, normal S1 and S2, no murmurs.   Abdomen: soft, non-tender, non-distended, normal bowel sounds, no masses or organomegaly  Extremities: no cyanosis, clubbing or edema  Musculoskeletal: normal range of motion, no joint swelling, deformity or tenderness  Neurologic: no cranial nerve deficit and muscle strength normal    Data Review:    WBC   Date Value Ref Range Status   2021 6.7 3.5 - 11.3 k/uL Final   12/10/2020 7.6 3.5 - 11.3 k/uL Final   2020 5.1 3.5 - 11.0 k/uL Final     Hemoglobin   Date Value Ref Range Status   07/28/2021 12.9 11.9 - 15.1 g/dL Final   12/10/2020 13.7 11.9 - 15.1 g/dL Final   06/30/2020 13.9 12.0 - 16.0 g/dL Final     Hematocrit   Date Value Ref Range Status   07/28/2021 41.9 36.3 - 47.1 % Final   12/10/2020 44.2 36.3 - 47.1 % Final   06/30/2020 42.7 36 - 46 % Final     MCV   Date Value Ref Range Status   07/28/2021 96.8 82.6 - 102.9 fL Final   12/10/2020 95.3 82.6 - 102.9 fL Final   06/30/2020 88.8 80 - 100 fL Final     Platelets   Date Value Ref Range Status   07/28/2021 See Reflexed IPF Result 138 - 453 k/uL Final   12/10/2020 See Reflexed IPF Result 138 - 453 k/uL Final   06/30/2020 95 (L) 150 - 450 k/uL Final     Sodium   Date Value Ref Range Status   07/28/2021 140 135 - 144 mmol/L Final   12/10/2020 142 135 - 144 mmol/L Final   06/30/2020 139 135 - 144 mmol/L Final     Potassium   Date Value Ref Range Status   07/28/2021 4.6 3.7 - 5.3 mmol/L Final   12/10/2020 4.0 3.7 - 5.3 mmol/L Final   06/30/2020 4.4 3.7 - 5.3 mmol/L Final     Chloride   Date Value Ref Range Status   07/28/2021 107 98 - 107 mmol/L Final   12/10/2020 108 (H) 98 - 107 mmol/L Final   06/30/2020 104 98 - 107 mmol/L Final     CO2   Date Value Ref Range Status   07/28/2021 20 20 - 31 mmol/L Final   12/10/2020 17 (L) 20 - 31 mmol/L Final   06/30/2020 22 20 - 31 mmol/L Final     Phosphorus   Date Value Ref Range Status   08/24/2018 3.5 2.6 - 4.5 mg/dL Final     BUN   Date Value Ref Range Status   07/28/2021 22 8 - 23 mg/dL Final   12/10/2020 17 8 - 23 mg/dL Final   06/30/2020 20 8 - 23 mg/dL Final     CREATININE   Date Value Ref Range Status   07/28/2021 0.70 0.50 - 0.90 mg/dL Final   12/10/2020 0.77 0.50 - 0.90 mg/dL Final   06/30/2020 0.75 0.50 - 0.90 mg/dL Final     AST   Date Value Ref Range Status   07/28/2021 17 <32 U/L Final   12/10/2020 19 <32 U/L Final   08/09/2019 18 <32 U/L Final     ALT   Date Value Ref Range Status   07/28/2021 18 5 - 33 U/L Final   12/10/2020 18 5 - 33 U/L Final   08/09/2019 14 5 - 33 U/L Final     Total Bilirubin   Date Value Ref Range Status   07/28/2021 0.58 0.3 - 1.2 mg/dL Final   12/10/2020 0.62 0.3 - 1.2 mg/dL Final   08/09/2019 0.34 0.3 - 1.2 mg/dL Final     Alkaline Phosphatase   Date Value Ref Range Status   07/28/2021 70 35 - 104 U/L Final   12/10/2020 73 35 - 104 U/L Final   08/09/2019 62 35 - 104 U/L Final     Lipase   Date Value Ref Range Status   01/06/2017 41 13 - 60 U/L Final     Comment:     [address] [facility] [address] [place] [address] [phone]   03/12/2016 15 13 - 60 U/L Final     Comment:     Performed at [address] [place] [address]   [phone]       Amylase   Date Value Ref Range Status   01/06/2017 47 28 - 100 U/L Final     Comment:     [address] [facility] [address] [place] [address] [phone]   03/12/2016 18 (L) 28 - 100 U/L Final     Comment:     Performed at [address] [place] [address]   [phone]       Protime   Date Value Ref Range Status   02/21/2017 10.8 9.7 - 12.0 sec Final   12/15/2016 10.7 9.7 - 12.0 sec Final     INR   Date Value Ref Range Status   02/21/2017 1.0  Final     Comment:           Non-therapeutic Range:     INR = 0.9-1.2  Therapeutic Range: Moderate Anticoagulant Intensity:     INR = 2.0-3.0   High Anticoagulant Intensity:     INR = 2.5-3.5        Performed at [address]   [phone]     12/15/2016 1.0  Final     Comment:           Non-therapeutic Range:     INR = 0.9-1.2  Therapeutic Range: Moderate Anticoagulant Intensity:     INR = 2.0-3.0   High Anticoagulant Intensity:     INR = 2.5-3.5        Performed at [address] [place] [address]   [phone]       No results found for: PTT  No results found for: OCCULTBLD  No results found for: GLUMET     Imaging Studies:                           All appropriate imaging studies and reports reviewed: Yes  No results found. Assessment:      Diagnosis Orders   1. Pneumonia due to infectious organism, unspecified laterality, unspecified part of lung  Procalcitonin    XR CHEST (SINGLE VIEW FRONTAL)   Recurrent UTIs  History of chronic ITP, CVA, hypertension, hyperlipidemia, diabetes mellitus, diabetic gastroparesis. Recommendations:   P.o. Levaquin and Diflucan  D-mannose  Chest x-ray  Procalcitonin level  CBC  Follow-up in 1 week  Orders Placed This Encounter   Procedures    XR CHEST (SINGLE VIEW FRONTAL)     Standing Status:   Future     Standing Expiration Date:   12/1/2022     Order Specific Question:   Reason for exam:     Answer:   pneumonia    Procalcitonin     Standing Status:   Future     Standing Expiration Date:   12/1/2022         Thank you for allowing me to participate in the care of your patient. Please feel free to contact me with any questions or concerns.      Carlitos Mercado MD

## 2021-12-02 ENCOUNTER — HOSPITAL ENCOUNTER (OUTPATIENT)
Dept: WOMENS IMAGING | Age: 81
Discharge: HOME | End: 2021-12-04
Payer: MEDICARE

## 2021-12-02 DIAGNOSIS — Z12.31 BREAST CANCER SCREENING BY MAMMOGRAM: ICD-10-CM

## 2021-12-02 PROCEDURE — 77063 BREAST TOMOSYNTHESIS BI: CPT

## 2021-12-08 ENCOUNTER — OFFICE VISIT (OUTPATIENT)
Dept: ORTHOPEDIC SURGERY | Age: 81
End: 2021-12-08
Payer: MEDICARE

## 2021-12-08 ENCOUNTER — OFFICE VISIT (OUTPATIENT)
Dept: INFECTIOUS DISEASES | Age: 81
End: 2021-12-08
Payer: MEDICARE

## 2021-12-08 VITALS
OXYGEN SATURATION: 92 % | TEMPERATURE: 97.1 F | HEIGHT: 59 IN | HEART RATE: 76 BPM | DIASTOLIC BLOOD PRESSURE: 83 MMHG | BODY MASS INDEX: 32.05 KG/M2 | WEIGHT: 159 LBS | SYSTOLIC BLOOD PRESSURE: 135 MMHG | RESPIRATION RATE: 17 BRPM

## 2021-12-08 DIAGNOSIS — M25.561 PAIN IN BOTH KNEES, UNSPECIFIED CHRONICITY: Primary | ICD-10-CM

## 2021-12-08 DIAGNOSIS — M25.562 PAIN IN BOTH KNEES, UNSPECIFIED CHRONICITY: Primary | ICD-10-CM

## 2021-12-08 DIAGNOSIS — N39.0 RECURRENT UTI: Primary | ICD-10-CM

## 2021-12-08 PROCEDURE — G8427 DOCREV CUR MEDS BY ELIG CLIN: HCPCS | Performed by: INTERNAL MEDICINE

## 2021-12-08 PROCEDURE — 4040F PNEUMOC VAC/ADMIN/RCVD: CPT | Performed by: INTERNAL MEDICINE

## 2021-12-08 PROCEDURE — G8484 FLU IMMUNIZE NO ADMIN: HCPCS | Performed by: INTERNAL MEDICINE

## 2021-12-08 PROCEDURE — 1123F ACP DISCUSS/DSCN MKR DOCD: CPT | Performed by: ORTHOPAEDIC SURGERY

## 2021-12-08 PROCEDURE — 1036F TOBACCO NON-USER: CPT | Performed by: INTERNAL MEDICINE

## 2021-12-08 PROCEDURE — 1090F PRES/ABSN URINE INCON ASSESS: CPT | Performed by: INTERNAL MEDICINE

## 2021-12-08 PROCEDURE — 1123F ACP DISCUSS/DSCN MKR DOCD: CPT | Performed by: INTERNAL MEDICINE

## 2021-12-08 PROCEDURE — G8417 CALC BMI ABV UP PARAM F/U: HCPCS | Performed by: ORTHOPAEDIC SURGERY

## 2021-12-08 PROCEDURE — G8428 CUR MEDS NOT DOCUMENT: HCPCS | Performed by: ORTHOPAEDIC SURGERY

## 2021-12-08 PROCEDURE — 99213 OFFICE O/P EST LOW 20 MIN: CPT | Performed by: ORTHOPAEDIC SURGERY

## 2021-12-08 PROCEDURE — 99213 OFFICE O/P EST LOW 20 MIN: CPT | Performed by: INTERNAL MEDICINE

## 2021-12-08 PROCEDURE — G8400 PT W/DXA NO RESULTS DOC: HCPCS | Performed by: INTERNAL MEDICINE

## 2021-12-08 PROCEDURE — 1090F PRES/ABSN URINE INCON ASSESS: CPT | Performed by: ORTHOPAEDIC SURGERY

## 2021-12-08 PROCEDURE — G8417 CALC BMI ABV UP PARAM F/U: HCPCS | Performed by: INTERNAL MEDICINE

## 2021-12-08 PROCEDURE — 1036F TOBACCO NON-USER: CPT | Performed by: ORTHOPAEDIC SURGERY

## 2021-12-08 PROCEDURE — 4040F PNEUMOC VAC/ADMIN/RCVD: CPT | Performed by: ORTHOPAEDIC SURGERY

## 2021-12-08 PROCEDURE — G8400 PT W/DXA NO RESULTS DOC: HCPCS | Performed by: ORTHOPAEDIC SURGERY

## 2021-12-08 PROCEDURE — G8484 FLU IMMUNIZE NO ADMIN: HCPCS | Performed by: ORTHOPAEDIC SURGERY

## 2021-12-08 RX ORDER — FLUCONAZOLE 100 MG/1
400 TABLET ORAL DAILY
Qty: 28 TABLET | Refills: 0 | Status: SHIPPED | OUTPATIENT
Start: 2021-12-08 | End: 2021-12-15

## 2021-12-08 NOTE — PROGRESS NOTES
well-developed and well nourished. HENT: Negative otherwise noted  Head: Normocephalic and Atraumatic  Nose: Normal  Eyes: Conjunctivae and EOM are normal  Neck: Normal range of motion Neck supple. Respiratory/Cardio: Effort normal. No respiratory distress. Musculoskeletal: Lamination patient's left knee no motion is 0 to 130 degrees she has excellent patellar tracking and she has good varus valgus stability at 0 60 and 90 degrees. Examination the patient's right knee notes her motion is good 0 to 125 degrees she has good patellar tracking. She has a little bit of varus play in mid flexion but certainly nothing pathologic. She does have a little tenderness over her IT band and greater trochanter but nothing too severe she has no pain on rotation of her hip. During the course of the examination patient pointed to a band across her lower back that she has is very tight and painful for her. Neurological: Patient is alert and oriented to person, place, and time. Normal strenght. No sensory deficit. Skin: Skin is warm and dry  Psychiatric: Behavior is normal. Thought content normal.  Nursing note and vitals reviewed. Labs and Imaging:     XR taken today:  XR KNEE LEFT (1-2 VIEWS)    Result Date: 12/8/2021  Rays taken today reviewed by me show AP and lateral views the patient's left knee. Patient status post left total knee arthroplasty components are in good position on AP and lateral views appropriate patellar height noted on the lateral view. XR KNEE RIGHT (1-2 VIEWS)    Result Date: 12/8/2021  X-rays taken today reviewed by me show AP lateral views the patient's right knee. Patient status post right total knee arthroplasty. Components appear to be in good position on both AP and lateral views without any significant change from most recent films.           Orders Placed This Encounter   Procedures    XR KNEE RIGHT (1-2 VIEWS)     Standing Status:   Future     Number of Occurrences:   1 Take by mouth 2 tablespoons every night , Disp: , Rfl:     albuterol (PROVENTIL) (5 MG/ML) 0.5% nebulizer solution, Inhale 0.5 mLs into the lungs as needed , Disp: , Rfl:     nystatin (MYCOSTATIN) 275681 UNIT/GM powder, Apply 3 times daily. , Disp: 45 g, Rfl: 3    esomeprazole (NEXIUM) 40 MG delayed release capsule, Take 1 capsule by mouth every morning (before breakfast), Disp: 90 capsule, Rfl: 3    hydrocortisone (ANUSOL-HC) 2.5 % CREA rectal cream, Place rectally 2 times daily, Disp: 28 g, Rfl: 3    vitamin B-12 (CYANOCOBALAMIN) 1000 MCG tablet, Take 1 tablet by mouth once a week, Disp: 30 tablet, Rfl: 3    aspirin 81 MG EC tablet, Take 1 tablet by mouth 2 times daily, Disp: 30 tablet, Rfl: 3    rOPINIRole (REQUIP) 1 MG tablet, Take by mouth 2 times daily , Disp: , Rfl:     glipiZIDE (GLUCOTROL) 5 MG tablet, 2.5mg in the am and 2.5 mg in the pm, Disp: , Rfl:     Continuous Blood Gluc Sensor (FREESTYLE DAVIS 14 DAY SENSOR) MIS, , Disp: , Rfl:     Multiple Vitamins-Minerals (THERAPEUTIC MULTIVITAMIN-MINERALS) tablet, Take 1 tablet by mouth daily , Disp: , Rfl:     Multiple Vitamins-Minerals (PRESERVISION AREDS PO), Take by mouth daily , Disp: , Rfl:     Continuous Blood Gluc  (FREESTYLE DAVIS 14 DAY READER) MATTHEW, , Disp: , Rfl:     donepezil (ARICEPT) 10 MG tablet, Take 10 mg by mouth nightly , Disp: , Rfl:     estradiol (ESTRACE VAGINAL) 0.1 MG/GM vaginal cream, Place 1 g vaginally Twice a Week (Patient taking differently: Place 1 g vaginally every 14 days ), Disp: 1 Tube, Rfl: 5    CRANBERRY PO, Take by mouth 2 times daily, Disp: , Rfl:   Allergies   Allergen Reactions    Iodides Anaphylaxis, Hives and Itching     \"Contrast dyes\"  This was added by someone but Patient states has had IVP dyes multiple times without problems and had a myelogram in Dec 2016 without problems    Povidone-Iodine Anaphylaxis, Hives and Swelling    Sulfa Antibiotics Hives and Swelling     Other reaction(s): Unknown  Other reaction(s): Intolerance-unknown  Hands, feet, mouth, eyes  Other reaction(s): Unknown    Betadine [Povidone Iodine] Hives    Cephalexin Hives and Swelling    Cephalexin Itching     Other reaction(s): Hallucinations  In  received demerol and keflex together so was told to list both as allergies    Cozaar [Losartan] Nausea Only     Pt also gets sores on toungue    Cymbalta [Duloxetine Hcl] Diarrhea and Nausea And Vomiting     Weakness    Demerol Hives and Swelling    Doxycycline Other (See Comments)     Sore mouth        Meperidine Itching     Other reaction(s): Hallucinations      Morphine Hives and Itching    Penicillins Hives, Swelling and Itching     Other reaction(s): Intolerance-unknown  Other reaction(s): Unknown    Zithromax [Azithromycin] Other (See Comments)     Sore mouth      Clindamycin/Lincomycin     Etodolac     Fluorescein     Iodine      Other reaction(s):  Intolerance-unknown    Lisinopril      cough    Penicillin G     Soap     Toviaz [Fesoterodine Fumarate Er]     Clonazepam Other (See Comments)     From neuro: made her too sleepy    Metformin And Related Nausea And Vomiting     Diarrhea and N/V     Social History     Socioeconomic History    Marital status:      Spouse name: Not on file    Number of children: Not on file    Years of education: Not on file    Highest education level: Not on file   Occupational History    Occupation: retired   Tobacco Use    Smoking status: Former Smoker     Packs/day: 0.50     Years: 20.00     Pack years: 10.00     Quit date: 1982     Years since quittin.5    Smokeless tobacco: Former User     Quit date: 1982   Vaping Use    Vaping Use: Never used   Substance and Sexual Activity    Alcohol use: No    Drug use: No    Sexual activity: Not on file   Other Topics Concern    Not on file   Social History Narrative    Not on file     Social Determinants of Health     Financial Resource Strain: Low Risk     Difficulty of Paying Living Expenses: Not very hard   Food Insecurity: No Food Insecurity    Worried About Running Out of Food in the Last Year: Never true    Ran Out of Food in the Last Year: Never true   Transportation Needs: No Transportation Needs    Lack of Transportation (Medical): No    Lack of Transportation (Non-Medical): No   Physical Activity:     Days of Exercise per Week: Not on file    Minutes of Exercise per Session: Not on file   Stress:     Feeling of Stress : Not on file   Social Connections:     Frequency of Communication with Friends and Family: Not on file    Frequency of Social Gatherings with Friends and Family: Not on file    Attends [employer] Services: Not on file    Active Member of [address] [place] [address] or Organizations: Not on file    Attends Club or Organization Meetings: Not on file    Marital Status: Not on file   Intimate Partner Violence:     Fear of Current or Ex-Partner: Not on file    Emotionally Abused: Not on file    Physically Abused: Not on file    Sexually Abused: Not on file   Housing Stability:     Unable to Pay for Housing in the Last Year: Not on file    Number of [place] in the Last Year: Not on file    Unstable Housing in the Last Year: Not on file     Past Medical History:   Diagnosis Date    Age-related cognitive decline     Ankle pain     Left ankle fracture in ortho boat.    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    left with urinary incontinenc post surgical    Caffeine use     2 coffee/day, 1-2 tea per week    Chronic back pain     Chronic ITP (idiopathic thrombocytopenia) (HCC)     CVA (cerebral infarction) 2008, 2010    balance issues, short term memory loss    Diabetic gastroparesis ([facility] [address]     Diverticular disease 1986    GERD (gastroesophageal reflux disease)     Hiatal hernia     Hip fracture ([facility] [address]     History of blood transfusion     History of decreased platelet count  Hyperlipemia     Hypertension     Internal hemorrhoid     Macular degeneration of left eye 1980's    S/P laser treatment    Nausea and vomiting     Neuropathy     Osteoarthritis     Ringing in ears     Self-catheterizes urinary bladder     6-7 times daily    TIA (transient ischemic attack) 2000    x1    Tubular adenoma     colon polyps    Type II or unspecified type diabetes mellitus without mention of complication, not stated as uncontrolled     Urinary incontinence     frequent UTI s/p infection, surgical dilatation lead to incontinence    Wears dentures     full on top, partial on bottom    Wears glasses      Past Surgical History:   Procedure Laterality Date    APPENDECTOMY  1962    BLADDER SURGERY      bladder stimulator    BLADDER SUSPENSION  2002    multiple    CARDIAC CATHETERIZATION  2008    no stents    CARDIOVASCULAR STRESS TEST  12/2014    CATARACT REMOVAL WITH IMPLANT Left 11/07/2017    [facility]    CATARACT REMOVAL WITH IMPLANT Right 11/28/2017    [facility]    COLON SURGERY      colostomy reversal    COLONOSCOPY  04/07/2016    tubular adenoma x3; internal hemorrhoids    COLONOSCOPY N/A 11/06/2019    COLONOSCOPY DIAGNOSTIC performed by Veronica Choe MD at [address] [place] [address]  11/06/2019    COLOSTOMY  1986    bowel obstruction/ rupture from diverticular disease, reversal 3 mos later    CYSTOSCOPY  09/08/2017    W/ 200IU Botox     FRACTURE SURGERY Right 2011    hip    FRACTURE SURGERY Left 2001    [facility]    FRACTURE SURGERY Left 2013    ankle    HERNIA REPAIR      HYSTERECTOMY  1969    JOINT REPLACEMENT Bilateral 2000    BILAT KNEES    LUMBAR FUSION  2017    L2/L3    SD XCAPSL CTRC RMVL INSJ IO LENS PROSTH W/O ECP Left 11/07/2017    EYE CATARACT EMULSIFICATION IOL IMPLANT performed by Prudence Christianson MD at [address] [place] [address] RMVL INSJ IO LENS PROSTH W/O ECP Right 11/28/2017    EYE CATARACT EMULSIFICATION IOL IMPLANT performed by Danielle Echevarria MD at [facility] Right 10/27/2015    WITH POLY EXCHANGE BIOMET AND GPS APPLICATION    REVISION TOTAL KNEE ARTHROPLASTY Left 2020    KNEE TOTAL ARTHROPLASTY REVISION - POLY EXCHANGE performed by Eva Alvarez MD at [address] [facility]      fusion, did not take   [address]    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    NEAL AND BSO      TONSILLECTOMY      UPPER GASTROINTESTINAL ENDOSCOPY      UPPER GASTROINTESTINAL ENDOSCOPY  2014    UPPER GASTROINTESTINAL ENDOSCOPY  2016    mild gastritis    UPPER GASTROINTESTINAL ENDOSCOPY N/A 2018    retained thick secretions in proximal esophagus     Family History   Problem Relation Age of Onset    Breast Cancer Mother     Cancer Mother         breast and uterine  39    Heart Disease Father     Heart Attack Father          28   [address] [facility] Maternal Grandmother     Cancer Brother 46        bronchogenic adenocarcinoma cancer    Lung Cancer Brother     Cancer Sister         lung    Lung Cancer Sister          72    Breast Cancer Sister          62    Cancer Sister         breast   Plan  Patient was made aware that I overall think her knees are functioning well and she does agree with this. She thinks her biggest problem has to do with her low back and I do think that given her previous history and history of neurogenic claudication that she may benefit from evaluation. I am would have her be seen by my partner Dr. Robin Osborne for probable spinal stenosis. We will see her back otherwise on annual basis for her knees    Provider Attestation:  Taco Simpson, personally performed the services described in this documentation. All medical record entries made by the scribe were at my direction and in my presence.  I have reviewed the chart and discharge instructions and agree that the records reflect my personal performance and is accurate and complete. Cheyenne Lim MD. 12/08/21      Please note that this chart was generated using voice recognition Dragon dictation software. Although every effort was made to ensure the accuracy of this automated transcription, some errors in transcription may have occurred.

## 2021-12-08 NOTE — PROGRESS NOTES
Infectious disease Consult Note      Patient: Amanuel López  : 1940  Acct#:  [de-identified]     Date:  2021    Subjective:       History of Present Illness  Patient is a [de-identified] y.o.  female   Chief Complaint   Patient presents with    Follow-up     1 week follow up   The patient was referred by Dr. Abbie Zimmer for recurrent UTIs, several infections in the last year, most recent was on 10/18/2021 urine culture grew yeast was treated with Diflucan. She is on estradiol cream and cranberry pills. The patient had urinary retention does straight cath for years. She is on Myrbetriq      She had multiple antibiotics allergy include sulfa, cephalexin, penicillin, clindamycin and Zithromax. Previous urine culture on 2021, 2021 and 2021 grew yeast, not Candida albicans or Candida DUBLINIENSIS  Urine culture on 3 /29/21 and 2021 grew E. Coli  History of chronic ITP, CVA, hypertension, hyperlipidemia, diabetes mellitus, diabetic gastroparesis. Interstim sacral neuromodulation device  please placed 12/10/2013 removed 21 status post Botox 300 IU 2021. She had cystoscopy done  She is up-to-date on Covid 19 vaccine and flu  Interval history 2021  She is complaining of frequency with urination and dysuria  She is finishing a course of Diflucan and Levaquin. Urine culture on 2021 grew   YEAST, NOT CANDIDA ALBICANS OR CANDIDA DUBLINIENSIS >288308 CFU/ML Abnormal        Past Medical History:   Diagnosis Date    Age-related cognitive decline     Ankle pain     Left ankle fracture in ortho boat.     Anxiety     B12 deficiency     Winters esophagus     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[facility] [address]     left with urinary incontinenc post surgical    Caffeine use     2 coffee/day, 1-2 tea per week    Chronic back pain     Chronic ITP (idiopathic thrombocytopenia) (HCC)     CVA (cerebral infarction) ,     balance issues, short term memory loss    Diabetic gastroparesis ([facility] [address]     Diverticular disease 1986    GERD (gastroesophageal reflux disease)     Hiatal hernia     Hip fracture (HCC)     History of blood transfusion     History of decreased platelet count     Hyperlipemia     Hypertension     Internal hemorrhoid     Macular degeneration of left eye 1980's    S/P laser treatment    Nausea and vomiting     Neuropathy     Osteoarthritis     Ringing in ears     Self-catheterizes urinary bladder     6-7 times daily    TIA (transient ischemic attack) 2000    x1    Tubular adenoma     colon polyps    Type II or unspecified type diabetes mellitus without mention of complication, not stated as uncontrolled     Urinary incontinence     frequent UTI s/p infection, surgical dilatation lead to incontinence    Wears dentures     full on top, partial on bottom    Wears glasses       Past Surgical History:   Procedure Laterality Date    APPENDECTOMY  1962    BLADDER SURGERY      bladder stimulator    BLADDER SUSPENSION  2002    multiple    CARDIAC CATHETERIZATION  2008    no stents    CARDIOVASCULAR STRESS TEST  12/2014    CATARACT REMOVAL WITH IMPLANT Left 11/07/2017    [facility]    CATARACT REMOVAL WITH IMPLANT Right 11/28/2017    [facility]    COLON SURGERY      colostomy reversal    COLONOSCOPY  04/07/2016    tubular adenoma x3; internal hemorrhoids    COLONOSCOPY N/A 11/06/2019    COLONOSCOPY DIAGNOSTIC performed by Ulises Ram[facility]rez [facility] at [facility]  11/06/2019    COLOSTOMY  1986    bowel obstruction/ rupture from diverticular disease, reversal 3 mos later    CYSTOSCOPY  09/08/2017    W/ 200IU Botox     FRACTURE SURGERY Right 2011    hip    FRACTURE SURGERY Left 2001    WRIST    FRACTURE SURGERY Left 2013    ankle    HERNIA REPAIR      HYSTERECTOMY  1969    JOINT REPLACEMENT Bilateral 2000    BILAT KNEES    LUMBAR FUSION  2017    L2/L3    TN XCAPSL CTRC RMVL INSJ IO LENS PROSTH W/O ECP Left (XYLOCAINE) 2 % jelly APPLY TOPICALLY AS NEEDED      Wheat Dextrin (BENEFIBER DRINK MIX PO) Take by mouth 2 tablespoons every night       albuterol (PROVENTIL) (5 MG/ML) 0.5% nebulizer solution Inhale 0.5 mLs into the lungs as needed       nystatin (MYCOSTATIN) 340328 UNIT/GM powder Apply 3 times daily.  45 g 3    esomeprazole (NEXIUM) 40 MG delayed release capsule Take 1 capsule by mouth every morning (before breakfast) 90 capsule 3    hydrocortisone (ANUSOL-HC) 2.5 % CREA rectal cream Place rectally 2 times daily 28 g 3    vitamin B-12 (CYANOCOBALAMIN) 1000 MCG tablet Take 1 tablet by mouth once a week 30 tablet 3    aspirin 81 MG EC tablet Take 1 tablet by mouth 2 times daily 30 tablet 3    rOPINIRole (REQUIP) 1 MG tablet Take by mouth 2 times daily       glipiZIDE (GLUCOTROL) 5 MG tablet 2.5mg in the am and 2.5 mg in the pm      Continuous Blood Gluc Sensor (FREESTYLE DAVIS 14 DAY SENSOR) MISC       Multiple Vitamins-Minerals (THERAPEUTIC MULTIVITAMIN-MINERALS) tablet Take 1 tablet by mouth daily       Multiple Vitamins-Minerals (PRESERVISION AREDS PO) Take by mouth daily       Continuous Blood Gluc  (FREESTYLE DAVIS 14 DAY READER) MATTHEW       donepezil (ARICEPT) 10 MG tablet Take 10 mg by mouth nightly       estradiol (ESTRACE VAGINAL) 0.1 MG/GM vaginal cream Place 1 g vaginally Twice a Week (Patient taking differently: Place 1 g vaginally every 14 days ) 1 Tube 5    CRANBERRY PO Take by mouth 2 times daily      D-Mannose 350 MG CAPS Take 300 mg by mouth daily 30 capsule 3     Current Facility-Administered Medications on File Prior to Visit   Medication Dose Route Frequency Provider Last Rate Last Admin    0.9 % sodium chloride infusion 250 mL  250 mL IntraVENous Once Kyle Dale MD               Allergies  Allergies   Allergen Reactions    Iodides Anaphylaxis, Hives and Itching     \"Contrast dyes\"  This was added by someone but Patient states has had IVP dyes multiple times without problems and had a myelogram in Dec 2016 without problems    Povidone-Iodine Anaphylaxis, Hives and Swelling    Sulfa Antibiotics Hives and Swelling     Other reaction(s): Unknown  Other reaction(s): Intolerance-unknown  Hands, feet, mouth, eyes  Other reaction(s): Unknown    Betadine [Povidone Iodine] Hives    Cephalexin Hives and Swelling    Cephalexin Itching     Other reaction(s): Hallucinations  In  received demerol and keflex together so was told to list both as allergies    Cozaar [Losartan] Nausea Only     Pt also gets sores on toungue    Cymbalta [Duloxetine Hcl] Diarrhea and Nausea And Vomiting     Weakness    Demerol Hives and Swelling    Doxycycline Other (See Comments)     Sore mouth        Meperidine Itching     Other reaction(s): Hallucinations      Morphine Hives and Itching    Penicillins Hives, Swelling and Itching     Other reaction(s): Intolerance-unknown  Other reaction(s): Unknown    Zithromax [Azithromycin] Other (See Comments)     Sore mouth      Clindamycin/Lincomycin     Etodolac     Fluorescein     Iodine      Other reaction(s): Intolerance-unknown    Lisinopril      cough    Penicillin G     Soap     Toviaz [Fesoterodine Fumarate Er]     Clonazepam Other (See Comments)     From neuro: made her too sleepy    Metformin And Related Nausea And Vomiting     Diarrhea and N/V        Social   Social History     Tobacco Use    Smoking status: Former Smoker     Packs/day: 0.50     Years: 20.00     Pack years: 10.00     Quit date: 1982     Years since quittin.5    Smokeless tobacco: Former User     Quit date: 1982   Substance Use Topics    Alcohol use:  No             Family History   Problem Relation Age of Onset    Breast Cancer Mother     Cancer Mother         breast and uterine  39    Heart Disease Father     Heart Attack Father          28    Cancer Maternal Grandmother     Cancer Brother 46        bronchogenic adenocarcinoma cancer    Lung Cancer Brother     Cancer Sister         lung    Lung Cancer Sister          72    Breast Cancer Sister          62    Cancer Sister         breast          Review of Systems    Other than above 12 systems reviewed were negative . Physical Exam  /83   Pulse 76   Temp 97.1 °F (36.2 °C)   Resp 17   Ht 4' 11\" (1.499 m)   Wt 159 lb (72.1 kg)   SpO2 92%   BMI 32.11 kg/m²           General Appearance: alert and oriented to person, place and time, well-developed and well-nourished, in no acute distress  Skin: warm and dry, no rash or erythema  Head: normocephalic and atraumatic  Eyes: pupils equal, round, and reactive to light, extraocular eye movements intact, conjunctivae normal  ENT: hearing grossly normal bilaterally. Neck: neck supple and non tender . Pulmonary/Chest: clear to auscultation bilaterally- no wheezes, rales or rhonchi, normal air movement, no respiratory distress  Cardiovascular: normal rate, regular rhythm, normal S1 and S2, no murmurs.   Abdomen: soft, non-tender, non-distended, normal bowel sounds, no masses or organomegaly  Extremities: no cyanosis, clubbing or edema  Musculoskeletal: normal range of motion, no joint swelling, deformity or tenderness  Neurologic: no cranial nerve deficit and muscle strength normal    Data Review:    WBC   Date Value Ref Range Status   2021 6.7 3.5 - 11.3 k/uL Final   12/10/2020 7.6 3.5 - 11.3 k/uL Final   2020 5.1 3.5 - 11.0 k/uL Final     Hemoglobin   Date Value Ref Range Status   2021 12.9 11.9 - 15.1 g/dL Final   12/10/2020 13.7 11.9 - 15.1 g/dL Final   2020 13.9 12.0 - 16.0 g/dL Final     Hematocrit   Date Value Ref Range Status   2021 41.9 36.3 - 47.1 % Final   12/10/2020 44.2 36.3 - 47.1 % Final   2020 42.7 36 - 46 % Final     MCV   Date Value Ref Range Status   2021 96.8 82.6 - 102.9 fL Final   12/10/2020 95.3 82.6 - 102.9 fL Final   2020 88.8 80 - 100 fL Final     Platelets   Date Value Ref Range Status   07/28/2021 See Reflexed IPF Result 138 - 453 k/uL Final   12/10/2020 See Reflexed IPF Result 138 - 453 k/uL Final   06/30/2020 95 (L) 150 - 450 k/uL Final     Sodium   Date Value Ref Range Status   07/28/2021 140 135 - 144 mmol/L Final   12/10/2020 142 135 - 144 mmol/L Final   06/30/2020 139 135 - 144 mmol/L Final     Potassium   Date Value Ref Range Status   07/28/2021 4.6 3.7 - 5.3 mmol/L Final   12/10/2020 4.0 3.7 - 5.3 mmol/L Final   06/30/2020 4.4 3.7 - 5.3 mmol/L Final     Chloride   Date Value Ref Range Status   07/28/2021 107 98 - 107 mmol/L Final   12/10/2020 108 (H) 98 - 107 mmol/L Final   06/30/2020 104 98 - 107 mmol/L Final     CO2   Date Value Ref Range Status   07/28/2021 20 20 - 31 mmol/L Final   12/10/2020 17 (L) 20 - 31 mmol/L Final   06/30/2020 22 20 - 31 mmol/L Final     Phosphorus   Date Value Ref Range Status   08/24/2018 3.5 2.6 - 4.5 mg/dL Final     BUN   Date Value Ref Range Status   07/28/2021 22 8 - 23 mg/dL Final   12/10/2020 17 8 - 23 mg/dL Final   06/30/2020 20 8 - 23 mg/dL Final     CREATININE   Date Value Ref Range Status   07/28/2021 0.70 0.50 - 0.90 mg/dL Final   12/10/2020 0.77 0.50 - 0.90 mg/dL Final   06/30/2020 0.75 0.50 - 0.90 mg/dL Final     AST   Date Value Ref Range Status   07/28/2021 17 <32 U/L Final   12/10/2020 19 <32 U/L Final   08/09/2019 18 <32 U/L Final     ALT   Date Value Ref Range Status   07/28/2021 18 5 - 33 U/L Final   12/10/2020 18 5 - 33 U/L Final   08/09/2019 14 5 - 33 U/L Final     Total Bilirubin   Date Value Ref Range Status   07/28/2021 0.58 0.3 - 1.2 mg/dL Final   12/10/2020 0.62 0.3 - 1.2 mg/dL Final   08/09/2019 0.34 0.3 - 1.2 mg/dL Final     Alkaline Phosphatase   Date Value Ref Range Status   07/28/2021 70 35 - 104 U/L Final   12/10/2020 73 35 - 104 U/L Final   08/09/2019 62 35 - 104 U/L Final     Lipase   Date Value Ref Range Status   01/06/2017 41 13 - 60 U/L Final     Comment: [address] [phone]   03/12/2016 15 13 - 60 U/L Final     Comment:     Performed at [address]   [phone]       Amylase   Date Value Ref Range Status   01/06/2017 47 28 - 100 U/L Final     Comment:     [address] [phone]   03/12/2016 18 (L) 28 - 100 U/L Final     Comment:     Performed at [address]   [phone]       Protime   Date Value Ref Range Status   02/21/2017 10.8 9.7 - 12.0 sec Final   12/15/2016 10.7 9.7 - 12.0 sec Final     INR   Date Value Ref Range Status   02/21/2017 1.0  Final     Comment:           Non-therapeutic Range:     INR = 0.9-1.2  Therapeutic Range: Moderate Anticoagulant Intensity:     INR = 2.0-3.0   High Anticoagulant Intensity:     INR = 2.5-3.5        Performed at [address]   [phone]     12/15/2016 1.0  Final     Comment:           Non-therapeutic Range:     INR = 0.9-1.2  Therapeutic Range: Moderate Anticoagulant Intensity:     INR = 2.0-3.0   High Anticoagulant Intensity:     INR = 2.5-3.5        Performed at [address] [place] [address]   [phone]       No results found for: PTT  No results found for: OCCULTBLD  No results found for: GLUMET     Imaging Studies:                           All appropriate imaging studies and reports reviewed: Yes  No results found. Assessment:     Recurrent UTIs  History of chronic ITP, CVA, hypertension, hyperlipidemia, diabetes mellitus, diabetic gastroparesis. Recommendations:   P.o. Diflucan 400 mg daily for 1 week  D-mannose    No orders of the defined types were placed in this encounter. Thank you for allowing me to participate in the care of your patient. Please feel free to contact me with any questions or concerns. Adry Gomez MD

## 2021-12-09 ENCOUNTER — OFFICE VISIT (OUTPATIENT)
Dept: ORTHOPEDIC SURGERY | Age: 81
End: 2021-12-09
Payer: MEDICARE

## 2021-12-09 VITALS — BODY MASS INDEX: 32.05 KG/M2 | HEIGHT: 59 IN | RESPIRATION RATE: 18 BRPM | WEIGHT: 159 LBS

## 2021-12-09 DIAGNOSIS — M48.02 FORAMINAL STENOSIS OF CERVICAL REGION: ICD-10-CM

## 2021-12-09 DIAGNOSIS — M96.1 POST LAMINECTOMY SYNDROME: ICD-10-CM

## 2021-12-09 DIAGNOSIS — M43.10 ACQUIRED SPONDYLOLISTHESIS: ICD-10-CM

## 2021-12-09 DIAGNOSIS — M54.50 LOW BACK PAIN, UNSPECIFIED BACK PAIN LATERALITY, UNSPECIFIED CHRONICITY, UNSPECIFIED WHETHER SCIATICA PRESENT: Primary | ICD-10-CM

## 2021-12-09 PROCEDURE — G8417 CALC BMI ABV UP PARAM F/U: HCPCS | Performed by: ORTHOPAEDIC SURGERY

## 2021-12-09 PROCEDURE — 1123F ACP DISCUSS/DSCN MKR DOCD: CPT | Performed by: ORTHOPAEDIC SURGERY

## 2021-12-09 PROCEDURE — 4040F PNEUMOC VAC/ADMIN/RCVD: CPT | Performed by: ORTHOPAEDIC SURGERY

## 2021-12-09 PROCEDURE — G8400 PT W/DXA NO RESULTS DOC: HCPCS | Performed by: ORTHOPAEDIC SURGERY

## 2021-12-09 PROCEDURE — 99213 OFFICE O/P EST LOW 20 MIN: CPT | Performed by: ORTHOPAEDIC SURGERY

## 2021-12-09 PROCEDURE — G8484 FLU IMMUNIZE NO ADMIN: HCPCS | Performed by: ORTHOPAEDIC SURGERY

## 2021-12-09 PROCEDURE — 1090F PRES/ABSN URINE INCON ASSESS: CPT | Performed by: ORTHOPAEDIC SURGERY

## 2021-12-09 PROCEDURE — 1036F TOBACCO NON-USER: CPT | Performed by: ORTHOPAEDIC SURGERY

## 2021-12-09 PROCEDURE — G8427 DOCREV CUR MEDS BY ELIG CLIN: HCPCS | Performed by: ORTHOPAEDIC SURGERY

## 2021-12-09 NOTE — PROGRESS NOTES
Patient ID: Thomas Vela is a [de-identified] y.o. female    Chief Compliant:  Chief Complaint   Patient presents with    Back Pain        Diagnostic imaging:  Prior CT scan cervical spine multilevel cervical degenerative disc disease with resultant foraminal stenosis    AP lateral with lateral flexion and extension lumbar spine ankylosed grade 2 spondylolisthesis L5-S1 history of L1-2 PLIF no abnormal motion on flexion and extension  nerve stimulator placed by Mann      Assessment and Plan:  1. Low back pain, unspecified back pain laterality, unspecified chronicity, unspecified whether sciatica present    2. Acquired spondylolisthesis    3. Post laminectomy syndrome    4. Foraminal stenosis of cervical region          CT myelogram lumbar and cervical spine    Follow up after CT scans    HPI:  This is a [de-identified] y.o. female who presents to the clinic today for lower back evaluation. Hx of L1-2 PLIF by Dr. Emery Soler and stimulator for neuropathy    Patient with of neck pain radiating to the right shoulder and right hand. She reports right hand numbness. Symptoms have been ongoing for about a year. Patient also with lower back pain at the belt line radiating to the right lateral lower extremity. Patient is unable to walk long distances. Review of Systems   All other systems reviewed and are negative.       Past History:    Current Outpatient Medications:     fluconazole (DIFLUCAN) 100 MG tablet, Take 4 tablets by mouth daily for 7 days, Disp: 28 tablet, Rfl: 0    primidone (MYSOLINE) 50 MG tablet, Take 50 mg by mouth 2 times daily, Disp: , Rfl:     benzonatate (TESSALON) 200 MG capsule, Take 1 capsule by mouth 3 times daily as needed for Cough, Disp: 30 capsule, Rfl: 0    levoFLOXacin (LEVAQUIN) 500 MG tablet, Take 1 tablet by mouth daily for 10 days, Disp: 10 tablet, Rfl: 0    fluconazole (DIFLUCAN) 150 MG tablet, Take 1 tablet by mouth daily, Disp: 7 tablet, Rfl: 0    D-Mannose 350 MG CAPS, Take 300 mg by mouth daily, 14 DAY READER) MATTHEW, , Disp: , Rfl:     donepezil (ARICEPT) 10 MG tablet, Take 10 mg by mouth nightly , Disp: , Rfl:     estradiol (ESTRACE VAGINAL) 0.1 MG/GM vaginal cream, Place 1 g vaginally Twice a Week (Patient taking differently: Place 1 g vaginally every 14 days ), Disp: 1 Tube, Rfl: 5    CRANBERRY PO, Take by mouth 2 times daily, Disp: , Rfl:   Allergies   Allergen Reactions    Iodides Anaphylaxis, Hives and Itching     \"Contrast dyes\"  This was added by someone but Patient states has had IVP dyes multiple times without problems and had a myelogram in Dec 2016 without problems    Povidone-Iodine Anaphylaxis, Hives and Swelling    Sulfa Antibiotics Hives and Swelling     Other reaction(s): Unknown  Other reaction(s): Intolerance-unknown  Hands, feet, mouth, eyes  Other reaction(s): Unknown    Betadine [Povidone Iodine] Hives    Cephalexin Hives and Swelling    Cephalexin Itching     Other reaction(s): Hallucinations  In 1969 received demerol and keflex together so was told to list both as allergies    Cozaar [Losartan] Nausea Only     Pt also gets sores on toungue    Cymbalta [Duloxetine Hcl] Diarrhea and Nausea And Vomiting     Weakness    Demerol Hives and Swelling    Doxycycline Other (See Comments)     Sore mouth        Meperidine Itching     Other reaction(s): Hallucinations      Morphine Hives and Itching    Penicillins Hives, Swelling and Itching     Other reaction(s): Intolerance-unknown  Other reaction(s): Unknown    Zithromax [Azithromycin] Other (See Comments)     Sore mouth      Clindamycin/Lincomycin     Etodolac     Fluorescein     Iodine      Other reaction(s):  Intolerance-unknown    Lisinopril      cough    Penicillin G     Soap     Toviaz [Fesoterodine Fumarate Er]     Clonazepam Other (See Comments)     From neuro: made her too sleepy    Metformin And Related Nausea And Vomiting     Diarrhea and N/V     Social History     Socioeconomic History    Marital status:      Spouse name: Not on file    Number of children: Not on file    Years of education: Not on file    Highest education level: Not on file   Occupational History    Occupation: retired   Tobacco Use    Smoking status: Former Smoker     Packs/day: 0.50     Years: 20.00     Pack years: 10.00     Quit date: 1982     Years since quittin.5    Smokeless tobacco: Former User     Quit date: 1982   Vaping Use    Vaping Use: Never used   Substance and Sexual Activity    Alcohol use: No    Drug use: No    Sexual activity: Not on file   Other Topics Concern    Not on file   Social History Narrative    Not on file     Social Determinants of Health     Financial Resource Strain: Low Risk     Difficulty of Paying Living Expenses: Not very hard   Food Insecurity: No Food Insecurity    Worried About Running Out of Food in the Last Year: Never true    Nicko of Food in the Last Year: Never true   Transportation Needs: No Transportation Needs    Lack of Transportation (Medical): No    Lack of Transportation (Non-Medical):  No   Physical Activity:     Days of Exercise per Week: Not on file    Minutes of Exercise per Session: Not on file   Stress:     Feeling of Stress : Not on file   Social Connections:     Frequency of Communication with Friends and Family: Not on file    Frequency of Social Gatherings with Friends and Family: Not on file    Attends [employer] Services: Not on file    Active Member of Clubs or Organizations: Not on file    Attends Club or Organization Meetings: Not on file    Marital Status: Not on file   Intimate Partner Violence:     Fear of Current or Ex-Partner: Not on file    Emotionally Abused: Not on file    Physically Abused: Not on file    Sexually Abused: Not on file   Housing Stability:     Unable to Pay for Housing in the Last Year: Not on file    Number of [place] in the Last Year: Not on file    Unstable Housing in the Last Year: Not on file Past Medical History:   Diagnosis Date    Age-related cognitive decline     Ankle pain     Left ankle fracture in ortho boat.     Anxiety     B12 deficiency     Winters esophagus     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[facility] [facility] 1990    left with urinary incontinenc post surgical    Caffeine use     2 coffee/day, 1-2 tea per week    Chronic back pain     Chronic ITP (idiopathic thrombocytopenia) (HCC)     CVA (cerebral infarction) 2008, 2010    balance issues, short term memory loss    Diabetic gastroparesis ([facility]     Diverticular disease 1986    GERD (gastroesophageal reflux disease)     Hiatal hernia     Hip fracture ([facility] [facility]     History of blood transfusion     History of decreased platelet count     Hyperlipemia     Hypertension     Internal hemorrhoid     Macular degeneration of left eye 1980's    S/P laser treatment    Nausea and vomiting     Neuropathy     Osteoarthritis     Ringing in ears     Self-catheterizes urinary bladder     6-7 times daily    TIA (transient ischemic attack) 2000    x1    Tubular adenoma     colon polyps    Type II or unspecified type diabetes mellitus without mention of complication, not stated as uncontrolled     Urinary incontinence     frequent UTI s/p infection, surgical dilatation lead to incontinence    Wears dentures     full on top, partial on bottom    Wears glasses      Past Surgical History:   Procedure Laterality Date    APPENDECTOMY  1962    BLADDER SURGERY      bladder stimulator    BLADDER SUSPENSION  2002    multiple    CARDIAC CATHETERIZATION  2008    no stents    CARDIOVASCULAR STRESS TEST  12/2014    CATARACT REMOVAL WITH IMPLANT [facility]2017    [facility]    CATARACT REMOVAL WITH IMPLANT Right 11/28/2017    [facility]    COLON SURGERY      colostomy reversal    COLONOSCOPY  04/07/2016    tubular adenoma x3; internal hemorrhoids    COLONOSCOPY N/A 11/06/2019    COLONOSCOPY DIAGNOSTIC performed by Donavon Morales Exam:  Vitals signs and nursing note reviewed. Constitutional:       Appearance: well-developed. HENT:      Head: Normocephalic and atraumatic. Nose: Nose normal.   Eyes:      Conjunctiva/sclera: Conjunctivae normal.   Neck:      Musculoskeletal: Normal range of motion and neck supple. Pulmonary:      Effort: Pulmonary effort is normal. No respiratory distress. Musculoskeletal:      Comments: Normal gait     Skin:     General: Skin is warm and dry. Neurological:      Mental Status: Alert and oriented to person, place, and time. Sensory: No sensory deficit. Psychiatric:         Behavior: Behavior normal.         Thought Content: Thought content normal.        Provider Attestation:  Susana Bonner, personally performed the services described in this documentation. All medical record entries made by the scribe were at my direction and in my presence. I have reviewed the chart and discharge instructions and agree that the records reflect my personal performance and is accurate and complete. Rodney Stanton MD 12/9/21     Scribe Attestation:  By signing my name below, Karen Cespedes, attest that this documentation has been prepared under the direction and in the presence of Dr. Andi Cotton. Electronically signed: Cary Stoll, 12/9/21     Please note that this chart was generated using voice recognition Dragon dictation software. Although every effort was made to ensure the accuracy of this automated transcription, some errors in transcription may have occurred.

## 2021-12-14 ENCOUNTER — TELEPHONE (OUTPATIENT)
Dept: ORTHOPEDIC SURGERY | Age: 81
End: 2021-12-14

## 2021-12-14 DIAGNOSIS — M96.1 POST LAMINECTOMY SYNDROME: ICD-10-CM

## 2021-12-14 DIAGNOSIS — M43.10 ACQUIRED SPONDYLOLISTHESIS: Primary | ICD-10-CM

## 2021-12-14 DIAGNOSIS — M54.50 LOW BACK PAIN, UNSPECIFIED BACK PAIN LATERALITY, UNSPECIFIED CHRONICITY, UNSPECIFIED WHETHER SCIATICA PRESENT: ICD-10-CM

## 2021-12-14 DIAGNOSIS — M48.02 FORAMINAL STENOSIS OF CERVICAL REGION: ICD-10-CM

## 2021-12-14 NOTE — TELEPHONE ENCOUNTER
Called patient advised her that her order is in for her CT Mylegram for both cervicle and lumbar. Advised her that the allergy will be in comment section of her order and the radiologist is aware.  Closing encounter

## 2021-12-14 NOTE — TELEPHONE ENCOUNTER
Patient is asking if Dr Justice Eden will be ordering her a myelogram? Please contact her to discuss at earliest convenience. Thank you.

## 2021-12-14 NOTE — TELEPHONE ENCOUNTER
Rocio Donnelly MD  12/14/2021 8:27 AM  I phoned in Rx for pt Komal Crawford 4 wks ago per your request. will you please sign the pending Rx I sent you.   Female, [de-identified] y.o., 1940  [id number]  YOUS  [id number]

## 2021-12-23 ENCOUNTER — TELEPHONE (OUTPATIENT)
Dept: INTERVENTIONAL RADIOLOGY/VASCULAR | Age: 81
End: 2021-12-23

## 2021-12-23 ENCOUNTER — TELEPHONE (OUTPATIENT)
Dept: ORTHOPEDIC SURGERY | Age: 81
End: 2021-12-23

## 2021-12-23 NOTE — TELEPHONE ENCOUNTER
Informed pt that CT myelogram will be cancelled due to anaphylaxis to dye. Pt stated that Dr. Robin Osborne spoke with radiologist and confirmed the okay to schedule. Encounter that was placed on 12/14/21 confirmed this information, however, name of doctor was not mentioned. Spoke to Bruno miles in office to obtain radiologist name. Bruno miles stated that she was not given this information and that Dr. Robin Osborne will not be in the office until the beginning of the year. Bruno miles and pt were informed that appt will be canceled at time.

## 2021-12-23 NOTE — TELEPHONE ENCOUNTER
The IR radiology department is refusing to do patient CT/Myelogram due to her dye allergy. They need the name of radiologist that you spoke with stating that it was ok for patient to have the CT done. Ct will not be done until name is given.

## 2022-01-04 ENCOUNTER — TELEPHONE (OUTPATIENT)
Dept: ORTHOPEDIC SURGERY | Age: 82
End: 2022-01-04

## 2022-01-04 NOTE — TELEPHONE ENCOUNTER
Patient stated she had fallen yesterday and injured the same wrist she had surgery on in 2001. Patient would like a call from clinical in regards to some advisement. Please advise thank you!

## 2022-01-04 NOTE — TELEPHONE ENCOUNTER
Called patient and she stated her wrist is fine now, she thinks it was just sprained and sore from the fall that she had on Saturday. She will call us if she has an further issues with it.

## 2022-01-10 ENCOUNTER — TELEPHONE (OUTPATIENT)
Dept: ORTHOPEDIC SURGERY | Age: 82
End: 2022-01-10

## 2022-01-10 NOTE — TELEPHONE ENCOUNTER
Patient is asking for a return call regarding her myelogram (lumbar and cervical) that was cancelled on 12/27. She states Dr Keyla Almaraz was looking into something for her and she has never heard back from the office. She states she fell on the ice on 1/3 and has had increased pain. Please contact her at earliest convenience to discuss. Thank you.

## 2022-01-10 NOTE — TELEPHONE ENCOUNTER
Would you like to see patient back sooner, being that she had a fall. I have called IR to see the next steps in the process of getting her scheduled but unable to reach someone.

## 2022-01-11 NOTE — TELEPHONE ENCOUNTER
Spoke with Emerson Garcia in IR scheduling who states she will call patient to discuss the next steps, she stated she will more than likely call patient in a script for steroid and have pt take the medication 12 hr before procedure. closing encounter

## 2022-01-12 ENCOUNTER — OFFICE VISIT (OUTPATIENT)
Dept: INFECTIOUS DISEASES | Age: 82
End: 2022-01-12
Payer: MEDICARE

## 2022-01-12 VITALS
SYSTOLIC BLOOD PRESSURE: 132 MMHG | HEART RATE: 77 BPM | RESPIRATION RATE: 16 BRPM | OXYGEN SATURATION: 98 % | HEIGHT: 59 IN | BODY MASS INDEX: 32.05 KG/M2 | DIASTOLIC BLOOD PRESSURE: 78 MMHG | TEMPERATURE: 97.2 F | WEIGHT: 159 LBS

## 2022-01-12 DIAGNOSIS — N39.0 RECURRENT UTI: Primary | ICD-10-CM

## 2022-01-12 PROCEDURE — 1036F TOBACCO NON-USER: CPT | Performed by: INTERNAL MEDICINE

## 2022-01-12 PROCEDURE — G8417 CALC BMI ABV UP PARAM F/U: HCPCS | Performed by: INTERNAL MEDICINE

## 2022-01-12 PROCEDURE — G8484 FLU IMMUNIZE NO ADMIN: HCPCS | Performed by: INTERNAL MEDICINE

## 2022-01-12 PROCEDURE — 1123F ACP DISCUSS/DSCN MKR DOCD: CPT | Performed by: INTERNAL MEDICINE

## 2022-01-12 PROCEDURE — G8427 DOCREV CUR MEDS BY ELIG CLIN: HCPCS | Performed by: INTERNAL MEDICINE

## 2022-01-12 PROCEDURE — 99213 OFFICE O/P EST LOW 20 MIN: CPT | Performed by: INTERNAL MEDICINE

## 2022-01-12 PROCEDURE — G8400 PT W/DXA NO RESULTS DOC: HCPCS | Performed by: INTERNAL MEDICINE

## 2022-01-12 PROCEDURE — 1090F PRES/ABSN URINE INCON ASSESS: CPT | Performed by: INTERNAL MEDICINE

## 2022-01-12 PROCEDURE — 4040F PNEUMOC VAC/ADMIN/RCVD: CPT | Performed by: INTERNAL MEDICINE

## 2022-01-12 NOTE — PROGRESS NOTES
Infectious disease Consult Note      Patient: Claude Blankenship  : 1940  Acct#:  [de-identified]     Date:  2022    Subjective:       History of Present Illness  Patient is a [de-identified] y.o.  female   Chief Complaint   Patient presents with    Follow-up     1 month follow up   The patient was referred by Dr. Rosendo Mtz for recurrent UTIs, several infections in the last year, most recent was on 10/18/2021 urine culture grew yeast was treated with Diflucan. The patient had urinary retention does straight cath for years. She is on Myrbetriq      She had multiple antibiotics allergy include sulfa, cephalexin, penicillin, clindamycin and Zithromax. Previous urine culture on 2021, 2021 and 2021 grew yeast, not Candida albicans or Candida DUBLINIENSIS  Urine culture on 3 /29/21 and 2021 grew E. Coli  History of chronic ITP, CVA, hypertension, hyperlipidemia, diabetes mellitus, diabetic gastroparesis. Interstim sacral neuromodulation device  please placed 12/10/2013 removed 21 status post Botox 300 IU 2021. She had cystoscopy done  She is up-to-date on Covid 19 vaccine and flu  Interval history 2021  She is complaining of frequency with urination and dysuria  She is finishing a course of Diflucan and Levaquin. Urine culture on 2021 grew   YEAST, NOT CANDIDA ALBICANS OR CANDIDA DUBLINIENSIS >[id number] CFU/ML Abnormal      Interval history 2022    The patient was treated with high-dose Diflucan 400 daily for 7 days last month. She is feeling well today, denied urinary symptoms, no frequency or burning, still use straight cath, denied abdominal pain, no fever or chills, no other complaints. She is on estradiol cream, d-mannose and cranberry pills. Past Medical History:   Diagnosis Date    Age-related cognitive decline     Ankle pain     Left ankle fracture in ortho boat.    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    left with urinary incontinenc post surgical    Caffeine use     2 coffee/day, 1-2 tea per week    Chronic back pain     Chronic ITP (idiopathic thrombocytopenia) (HCC)     CVA (cerebral infarction) 2008, 2010    balance issues, short term memory loss    Diabetic gastroparesis ([address]     Diverticular disease 1986    GERD (gastroesophageal reflux disease)     Hiatal hernia     Hip fracture (HCC)     History of blood transfusion     History of decreased platelet count     Hyperlipemia     Hypertension     Internal hemorrhoid     Macular degeneration of left eye 1980's    S/P laser treatment    Nausea and vomiting     Neuropathy     Osteoarthritis     Ringing in ears     Self-catheterizes urinary bladder     6-7 times daily    TIA (transient ischemic attack) 2000    x1    Tubular adenoma     colon polyps    Type II or unspecified type diabetes mellitus without mention of complication, not stated as uncontrolled     Urinary incontinence     frequent UTI s/p infection, surgical dilatation lead to incontinence    Wears dentures     full on top, partial on bottom    Wears glasses       Past Surgical History:   Procedure Laterality Date    APPENDECTOMY  1962    BLADDER SURGERY      bladder stimulator    BLADDER SUSPENSION  2002    multiple    CARDIAC CATHETERIZATION  2008    no stents    CARDIOVASCULAR STRESS TEST  12/2014    CATARACT REMOVAL WITH IMPLANT Left 11/07/2017    [facility]    CATARACT REMOVAL WITH IMPLANT Right 11/28/2017    [facility]    COLON SURGERY      colostomy reversal    COLONOSCOPY  04/07/2016    tubular adenoma x3; internal hemorrhoids    COLONOSCOPY N/A 11/06/2019    COLONOSCOPY DIAGNOSTIC performed by Eulice Ganser, [facility]  11/06/2019    COLOSTOMY  1986    bowel obstruction/ rupture from diverticular disease, reversal 3 mos later    CYSTOSCOPY  09/08/2017    W/ 200IU Botox     FRACTURE SURGERY Right 2011    hip    FRACTURE SURGERY Left 2001    WRIST    FRACTURE SURGERY Left 2013    ankle    HERNIA REPAIR      HYSTERECTOMY  1969    JOINT REPLACEMENT Bilateral 2000    BILAT KNEES    LUMBAR FUSION  2017    L2/L3    AZ XCAPSL CTRC RMVL INSJ IO LENS PROSTH W/O ECP Left 11/07/2017    EYE CATARACT EMULSIFICATION IOL IMPLANT performed by Erickson Mueller MD at [address] [facility] W/O ECP Right 11/28/2017    EYE CATARACT EMULSIFICATION IOL IMPLANT performed by Erickson Mueller MD at [facility] Right 10/27/2015    WITH POLY EXCHANGE BIOMET AND GPS APPLICATION    REVISION TOTAL KNEE ARTHROPLASTY Left 07/14/2020    KNEE TOTAL ARTHROPLASTY REVISION - POLY EXCHANGE performed by Jovanni Mcneil MD at [address]  2010    fusion, did not take   [address]    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    NEAL AND BSO      TONSILLECTOMY  1978    UPPER GASTROINTESTINAL ENDOSCOPY      UPPER GASTROINTESTINAL ENDOSCOPY  05/05/2014    UPPER GASTROINTESTINAL ENDOSCOPY  04/07/2016    mild gastritis    UPPER GASTROINTESTINAL ENDOSCOPY N/A 07/17/2018    retained thick secretions in proximal esophagus          Admission Meds  Current Outpatient Medications on File Prior to Visit   Medication Sig Dispense Refill    primidone (MYSOLINE) 50 MG tablet Take 50 mg by mouth 2 times daily      benzonatate (TESSALON) 200 MG capsule Take 1 capsule by mouth 3 times daily as needed for Cough 30 capsule 0    fluconazole (DIFLUCAN) 150 MG tablet Take 1 tablet by mouth daily 7 tablet 0    LIVALO 2 MG TABS tablet Take 1 tablet by mouth nightly 30 tablet 3    nystatin-triamcinolone (MYCOLOG II) 201373-4.1 UNIT/GM-% cream Apply topically 4 times daily Apply topically 4 times daily.  30 g 3    lubiprostone (AMITIZA) 8 MCG CAPS capsule Take 1 capsule by mouth daily 30 capsule 1    nitrofurantoin, macrocrystal-monohydrate, (MACROBID) 100 MG capsule Take 1 capsule by mouth daily 30 capsule 11    irbesartan (AVAPRO) 75 MG tablet TAKE 1 TABLET BY MOUTH ONE TIME A DAY  30 tablet 5    lidocaine (XYLOCAINE) 2 % jelly APPLY TOPICALLY AS NEEDED      Wheat Dextrin (BENEFIBER DRINK MIX PO) Take by mouth 2 tablespoons every night       albuterol (PROVENTIL) (5 MG/ML) 0.5% nebulizer solution Inhale 0.5 mLs into the lungs as needed       nystatin (MYCOSTATIN) 515029 UNIT/GM powder Apply 3 times daily.  45 g 3    esomeprazole (NEXIUM) 40 MG delayed release capsule Take 1 capsule by mouth every morning (before breakfast) 90 capsule 3    hydrocortisone (ANUSOL-HC) 2.5 % CREA rectal cream Place rectally 2 times daily 28 g 3    vitamin B-12 (CYANOCOBALAMIN) 1000 MCG tablet Take 1 tablet by mouth once a week 30 tablet 3    aspirin 81 MG EC tablet Take 1 tablet by mouth 2 times daily 30 tablet 3    rOPINIRole (REQUIP) 1 MG tablet Take by mouth 2 times daily       glipiZIDE (GLUCOTROL) 5 MG tablet 2.5mg in the am and 2.5 mg in the pm      Continuous Blood Gluc Sensor (FREESTYLE DAVIS 14 DAY SENSOR) MISC       Multiple Vitamins-Minerals (THERAPEUTIC MULTIVITAMIN-MINERALS) tablet Take 1 tablet by mouth daily       Multiple Vitamins-Minerals (PRESERVISION AREDS PO) Take by mouth daily       Continuous Blood Gluc  (FREESTYLE DAVIS 14 DAY READER) MATTHEW       donepezil (ARICEPT) 10 MG tablet Take 10 mg by mouth nightly       estradiol (ESTRACE VAGINAL) 0.1 MG/GM vaginal cream Place 1 g vaginally Twice a Week (Patient taking differently: Place 1 g vaginally every 14 days ) 1 Tube 5    CRANBERRY PO Take by mouth 2 times daily      D-Mannose 350 MG CAPS Take 300 mg by mouth daily 30 capsule 3     Current Facility-Administered Medications on File Prior to Visit   Medication Dose Route Frequency Provider Last Rate Last Admin    0.9 % sodium chloride infusion 250 mL  250 mL IntraVENous Once Romulo Morocho MD Allergies  Allergies   Allergen Reactions    Iodides Anaphylaxis, Hives and Itching     \"Contrast dyes\"  This was added by someone but Patient states has had IVP dyes multiple times without problems and had a myelogram in Dec 2016 without problems    Povidone-Iodine Anaphylaxis, Hives and Swelling    Sulfa Antibiotics Hives and Swelling     Other reaction(s): Unknown  Other reaction(s): Intolerance-unknown  Hands, feet, mouth, eyes  Other reaction(s): Unknown    Betadine [Povidone Iodine] Hives    Cephalexin Hives and Swelling    Cephalexin Itching     Other reaction(s): Hallucinations  In  received demerol and keflex together so was told to list both as allergies    Cozaar [Losartan] Nausea Only     Pt also gets sores on toungue    Cymbalta [Duloxetine Hcl] Diarrhea and Nausea And Vomiting     Weakness    Demerol Hives and Swelling    Doxycycline Other (See Comments)     Sore mouth        Meperidine Itching     Other reaction(s): Hallucinations      Morphine Hives and Itching    Penicillins Hives, Swelling and Itching     Other reaction(s): Intolerance-unknown  Other reaction(s): Unknown    Zithromax [Azithromycin] Other (See Comments)     Sore mouth      Clindamycin/Lincomycin     Etodolac     Fluorescein     Iodine      Other reaction(s): Intolerance-unknown    Lisinopril      cough    Penicillin G     Soap     Toviaz [Fesoterodine Fumarate Er]     Clonazepam Other (See Comments)     From neuro: made her too sleepy    Metformin And Related Nausea And Vomiting     Diarrhea and N/V        Social   Social History     Tobacco Use    Smoking status: Former Smoker     Packs/day: 0.50     Years: 20.00     Pack years: 10.00     Quit date: 1982     Years since quittin.6    Smokeless tobacco: Former User     Quit date: 1982   Substance Use Topics    Alcohol use:  No             Family History   Problem Relation Age of Onset   Mejias Breast Cancer Mother     Cancer Mother breast and uterine  39    Heart Disease Father     Heart Attack Father          28   [address] [facility] Maternal Grandmother     Cancer Brother 46        bronchogenic adenocarcinoma cancer    Lung Cancer Brother     Cancer Sister         lung    Lung Cancer Sister          72    Breast Cancer Sister          62    Cancer Sister         breast          Review of Systems    Other than above 12 systems reviewed were negative . Physical Exam  /78   Pulse 77   Temp 97.2 °F (36.2 °C)   Resp 16   Ht 4' 11\" (1.499 m)   Wt 159 lb (72.1 kg)   SpO2 98%   BMI 32.11 kg/m²           General Appearance: alert and oriented to person, place and time, well-developed and well-nourished, in no acute distress  Skin: warm and dry, no rash or erythema  Head: normocephalic and atraumatic  Eyes: pupils equal, round, and reactive to light, extraocular eye movements intact, conjunctivae normal  ENT: hearing grossly normal bilaterally. Neck: neck supple and non tender . Pulmonary/Chest: clear to auscultation bilaterally- no wheezes, rales or rhonchi, normal air movement, no respiratory distress  Cardiovascular: normal rate, regular rhythm, normal S1 and S2, no murmurs.   Abdomen: soft, non-tender, non-distended, normal bowel sounds, no masses or organomegaly  Extremities: no cyanosis, clubbing or edema  Musculoskeletal: normal range of motion, no joint swelling, deformity or tenderness  Neurologic: no cranial nerve deficit and muscle strength normal    Data Review:    WBC   Date Value Ref Range Status   2021 6.7 3.5 - 11.3 k/uL Final   12/10/2020 7.6 3.5 - 11.3 k/uL Final   2020 5.1 3.5 - 11.0 k/uL Final     Hemoglobin   Date Value Ref Range Status   2021 12.9 11.9 - 15.1 g/dL Final   12/10/2020 13.7 11.9 - 15.1 g/dL Final   2020 13.9 12.0 - 16.0 g/dL Final     Hematocrit   Date Value Ref Range Status   2021 41.9 36.3 - 47.1 % Final   12/10/2020 44.2 36.3 - 47.1 % Final   06/30/2020 42.7 36 - 46 % Final     MCV   Date Value Ref Range Status   07/28/2021 96.8 82.6 - 102.9 fL Final   12/10/2020 95.3 82.6 - 102.9 fL Final   06/30/2020 88.8 80 - 100 fL Final     Platelets   Date Value Ref Range Status   07/28/2021 See Reflexed IPF Result 138 - 453 k/uL Final   12/10/2020 See Reflexed IPF Result 138 - 453 k/uL Final   06/30/2020 95 (L) 150 - 450 k/uL Final     Sodium   Date Value Ref Range Status   07/28/2021 140 135 - 144 mmol/L Final   12/10/2020 142 135 - 144 mmol/L Final   06/30/2020 139 135 - 144 mmol/L Final     Potassium   Date Value Ref Range Status   07/28/2021 4.6 3.7 - 5.3 mmol/L Final   12/10/2020 4.0 3.7 - 5.3 mmol/L Final   06/30/2020 4.4 3.7 - 5.3 mmol/L Final     Chloride   Date Value Ref Range Status   07/28/2021 107 98 - 107 mmol/L Final   12/10/2020 108 (H) 98 - 107 mmol/L Final   06/30/2020 104 98 - 107 mmol/L Final     CO2   Date Value Ref Range Status   07/28/2021 20 20 - 31 mmol/L Final   12/10/2020 17 (L) 20 - 31 mmol/L Final   06/30/2020 22 20 - 31 mmol/L Final     Phosphorus   Date Value Ref Range Status   08/24/2018 3.5 2.6 - 4.5 mg/dL Final     BUN   Date Value Ref Range Status   07/28/2021 22 8 - 23 mg/dL Final   12/10/2020 17 8 - 23 mg/dL Final   06/30/2020 20 8 - 23 mg/dL Final     CREATININE   Date Value Ref Range Status   07/28/2021 0.70 0.50 - 0.90 mg/dL Final   12/10/2020 0.77 0.50 - 0.90 mg/dL Final   06/30/2020 0.75 0.50 - 0.90 mg/dL Final     AST   Date Value Ref Range Status   07/28/2021 17 <32 U/L Final   12/10/2020 19 <32 U/L Final   08/09/2019 18 <32 U/L Final     ALT   Date Value Ref Range Status   07/28/2021 18 5 - 33 U/L Final   12/10/2020 18 5 - 33 U/L Final   08/09/2019 14 5 - 33 U/L Final     Total Bilirubin   Date Value Ref Range Status   07/28/2021 0.58 0.3 - 1.2 mg/dL Final   12/10/2020 0.62 0.3 - 1.2 mg/dL Final   08/09/2019 0.34 0.3 - 1.2 mg/dL Final     Alkaline Phosphatase   Date Value Ref Range Status 07/28/2021 70 35 - 104 U/L Final   12/10/2020 73 35 - 104 U/L Final   08/09/2019 62 35 - 104 U/L Final     Lipase   Date Value Ref Range Status   01/06/2017 41 13 - 60 U/L Final     Comment:     [facility] [id number] [place] [address], [address] [place] [address] [phone]   03/12/2016 15 13 - 60 U/L Final     Comment:     Performed at [address]   [phone]       Amylase   Date Value Ref Range Status   01/06/2017 47 28 - 100 U/L Final     Comment:     [facility] [id number] [place] [address], [address] [place] [address] [phone]   03/12/2016 18 (L) 28 - 100 U/L Final     Comment:     Performed at [address]   [phone]       Protime   Date Value Ref Range Status   02/21/2017 10.8 9.7 - 12.0 sec Final   12/15/2016 10.7 9.7 - 12.0 sec Final     INR   Date Value Ref Range Status   02/21/2017 1.0  Final     Comment:           Non-therapeutic Range:     INR = 0.9-1.2  Therapeutic Range: Moderate Anticoagulant Intensity:     INR = 2.0-3.0   High Anticoagulant Intensity:     INR = 2.5-3.5        Performed at [place] [address] [address]   [phone]     12/15/2016 1.0  Final     Comment:           Non-therapeutic Range:     INR = 0.9-1.2  Therapeutic Range: Moderate Anticoagulant Intensity:     INR = 2.0-3.0   High Anticoagulant Intensity:     INR = 2.5-3.5        Performed at [address] [place] [address]   [phone]       No results found for: PTT  No results found for: OCCULTBLD  No results found for: GLUMET     Imaging Studies:                           All appropriate imaging studies and reports reviewed: Yes  No results found. Assessment:     Recurrent UTIs with no signs of active infection currently  History of chronic ITP, CVA, hypertension, hyperlipidemia, diabetes mellitus, diabetic gastroparesis.     Recommendations:   No antibiotics or antifungal at this point  D-mannose, estradiol cream and cranberry pills. Treat UTI if recur when symptomatic. Follow-up as needed    No orders of the defined types were placed in this encounter. Thank you for allowing me to participate in the care of your patient. Please feel free to contact me with any questions or concerns.      Lobo Seth MD

## 2022-01-19 RX ORDER — SODIUM CHLORIDE 0.9 % (FLUSH) 0.9 %
5-40 SYRINGE (ML) INJECTION PRN
Status: CANCELLED | OUTPATIENT
Start: 2022-01-19

## 2022-01-19 RX ORDER — SODIUM CHLORIDE 9 MG/ML
25 INJECTION, SOLUTION INTRAVENOUS PRN
Status: CANCELLED | OUTPATIENT
Start: 2022-01-19

## 2022-01-20 ENCOUNTER — HOSPITAL ENCOUNTER (OUTPATIENT)
Dept: CT IMAGING | Age: 82
Discharge: HOME | End: 2022-01-22
Payer: MEDICARE

## 2022-01-20 ENCOUNTER — HOSPITAL ENCOUNTER (OUTPATIENT)
Dept: INTERVENTIONAL RADIOLOGY/VASCULAR | Age: 82
Discharge: HOME | End: 2022-01-22
Payer: MEDICARE

## 2022-01-20 VITALS
SYSTOLIC BLOOD PRESSURE: 153 MMHG | RESPIRATION RATE: 18 BRPM | OXYGEN SATURATION: 97 % | HEART RATE: 73 BPM | DIASTOLIC BLOOD PRESSURE: 81 MMHG | TEMPERATURE: 97.1 F

## 2022-01-20 DIAGNOSIS — M96.1 POST LAMINECTOMY SYNDROME: ICD-10-CM

## 2022-01-20 DIAGNOSIS — M43.10 ACQUIRED SPONDYLOLISTHESIS: ICD-10-CM

## 2022-01-20 DIAGNOSIS — M54.50 LOW BACK PAIN, UNSPECIFIED BACK PAIN LATERALITY, UNSPECIFIED CHRONICITY, UNSPECIFIED WHETHER SCIATICA PRESENT: ICD-10-CM

## 2022-01-20 DIAGNOSIS — M48.02 FORAMINAL STENOSIS OF CERVICAL REGION: ICD-10-CM

## 2022-01-20 LAB
INR BLD: 1
PARTIAL THROMBOPLASTIN TIME: 27.1 SEC (ref 24–36)
PLATELET # BLD: 120 K/UL (ref 150–450)
PROTHROMBIN TIME: 13.5 SEC (ref 11.8–14.6)

## 2022-01-20 PROCEDURE — 7100000030 HC ASPR PHASE II RECOVERY - FIRST 15 MIN

## 2022-01-20 PROCEDURE — 85610 PROTHROMBIN TIME: CPT

## 2022-01-20 PROCEDURE — 36415 COLL VENOUS BLD VENIPUNCTURE: CPT

## 2022-01-20 PROCEDURE — 7100000011 HC PHASE II RECOVERY - ADDTL 15 MIN

## 2022-01-20 PROCEDURE — 85049 AUTOMATED PLATELET COUNT: CPT

## 2022-01-20 PROCEDURE — 72132 CT LUMBAR SPINE W/DYE: CPT

## 2022-01-20 PROCEDURE — 7100000031 HC ASPR PHASE II RECOVERY - ADDTL 15 MIN

## 2022-01-20 PROCEDURE — 2709999900 IR MYELOGRAM LUMBOSACRAL

## 2022-01-20 PROCEDURE — 7100000010 HC PHASE II RECOVERY - FIRST 15 MIN

## 2022-01-20 PROCEDURE — 72126 CT NECK SPINE W/DYE: CPT

## 2022-01-20 PROCEDURE — 6360000004 HC RX CONTRAST MEDICATION: Performed by: ORTHOPAEDIC SURGERY

## 2022-01-20 PROCEDURE — 62305 MYELOGRAPHY LUMBAR INJECTION: CPT

## 2022-01-20 PROCEDURE — 85730 THROMBOPLASTIN TIME PARTIAL: CPT

## 2022-01-20 RX ORDER — SODIUM CHLORIDE 0.9 % (FLUSH) 0.9 %
5-40 SYRINGE (ML) INJECTION PRN
Status: DISCONTINUED | OUTPATIENT
Start: 2022-01-20 | End: 2022-01-23 | Stop reason: HOSPADM

## 2022-01-20 RX ORDER — ACETAMINOPHEN 325 MG/1
650 TABLET ORAL EVERY 4 HOURS PRN
Status: DISCONTINUED | OUTPATIENT
Start: 2022-01-20 | End: 2022-01-23 | Stop reason: HOSPADM

## 2022-01-20 RX ORDER — SODIUM CHLORIDE 9 MG/ML
25 INJECTION, SOLUTION INTRAVENOUS PRN
Status: DISCONTINUED | OUTPATIENT
Start: 2022-01-20 | End: 2022-01-23 | Stop reason: HOSPADM

## 2022-01-20 RX ADMIN — IOPAMIDOL 15 ML: 612 INJECTION, SOLUTION INTRATHECAL at 09:51

## 2022-01-20 NOTE — H&P
HISTORY and [id number]       NAME:  David Garcia  MRN: [id number]   YOB: 1940   Date: 1/20/2022   Age: [de-identified] y.o. Gender: female       COMPLAINT AND PRESENT HISTORY:     David Garcia is a [de-identified] y.o.,  female, here today for CT Myelogram to lumbar and cervical.  Patient reports having a previous procedure done in 2016. Patient reports history of chronic pain in the neck and back. Pain has been present for the past 2 years. Patient is s/p x2 back fusions and a tumor removed. Pain is described as deep aching sensation (6/10), with  Radiation down the right leg. Patient denies any weakness/numbness in the upper or lower extremities. Pt states is most prominent in her right. patient reports that she had a fall on the ice,  January 3, 2022, she reports falling on her back, no loss of consciousness. Patient states that it really aggravated her pain more. She was able to walk after help getting up. Patient states that is hard getting up from lying down. Pt  States that her balance is bad secondary to her CVA. Patient uses a cane and walker at all times. Patient states that she use Tylenol for the pain. Patient  is aggravated by prolonged sitting and standing. Pain is improved with resting. . Patient reports that she does self catheterization that she been doing greater than 20 years. Pt states that she has hx of chronic constipation. Significant medical history:   CVA X2, TIA,-poor balance,  Neuropathy, HTN, HLD, DM  Denies current chest pain, palpitations, SOB, dizziness, leg swelling, headache. Blood sugar currently 242. Patient took prednisone last night and this morning. As instructed for protection from dye in this procedure  Patient has been NPO since midnight. No blood thinners in the past 5-7 days. Patient is otherwise feeling well today, no recent fever/chills, chest pain, or shortness of breath.        PAST MEDICAL HISTORY     Past Medical History:   Diagnosis Date    Age-related cognitive decline     Ankle pain     Left ankle fracture in ortho boat.    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    left with urinary incontinenc post surgical    Caffeine use     2 coffee/day, 1-2 tea per week    Chronic back pain     Chronic ITP (idiopathic thrombocytopenia) (HCC)     CVA (cerebral infarction) 2008, 2010    balance issues, short term memory loss    Diabetic gastroparesis ([facility]     Diverticular disease 1986    GERD (gastroesophageal reflux disease)     Hiatal hernia     Hip fracture ([facility]     History of blood transfusion     History of decreased platelet count     Hyperlipemia     Hypertension     Internal hemorrhoid     Macular degeneration of left eye 1980's    S/P laser treatment    Nausea and vomiting     Neuropathy     Osteoarthritis     Ringing in ears     Self-catheterizes urinary bladder     6-7 times daily    TIA (transient ischemic attack) 2000    x1    Tubular adenoma     colon polyps    Type II or unspecified type diabetes mellitus without mention of complication, not stated as uncontrolled     Urinary incontinence     frequent UTI s/p infection, surgical dilatation lead to incontinence    Wears dentures     full on top, partial on bottom    Wears glasses        SURGICAL HISTORY       Past Surgical History:   Procedure Laterality Date    APPENDECTOMY  1962    BLADDER SURGERY      bladder stimulator    BLADDER SUSPENSION  2002    multiple    CARDIAC CATHETERIZATION  2008    no stents    CARDIOVASCULAR STRESS TEST  12/2014    CATARACT REMOVAL WITH IMPLANT Left 11/07/2017    [facility]    CATARACT REMOVAL WITH IMPLANT Right 11/28/2017    [facility]    COLON SURGERY      colostomy reversal    COLONOSCOPY  04/07/2016    tubular adenoma x3; internal hemorrhoids    COLONOSCOPY N/A 11/06/2019    COLONOSCOPY DIAGNOSTIC performed by Urbano Iyer MD at [address] [facility]  2019    COLOSTOMY  1986    bowel obstruction/ rupture from diverticular disease, reversal 3 mos later    CYSTOSCOPY  2017    W/ 200IU Botox     FRACTURE SURGERY Right     hip    FRACTURE SURGERY Left     WRIST    FRACTURE SURGERY Left     ankle    HERNIA REPAIR      HYSTERECTOMY  1969    JOINT REPLACEMENT Bilateral 2000    BILAT KNEES    LUMBAR FUSION  2017    L2/L3    WA XCAPSL CTRC RMVL INSJ IO LENS PROSTH W/O ECP Left 2017    EYE CATARACT EMULSIFICATION IOL IMPLANT performed by Alejandrina Davis MD at [address] [place] [address] W/O ECP Right 2017    EYE CATARACT EMULSIFICATION IOL IMPLANT performed by Alejandrina Davis MD at [facility] Right 10/27/2015    WITH POLY EXCHANGE BIOMET AND GPS APPLICATION    REVISION TOTAL KNEE ARTHROPLASTY Left 2020    KNEE TOTAL ARTHROPLASTY REVISION - POLY EXCHANGE performed by Matt Yuen MD at [address] [facility]      fusion, did not take   [address]    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    NEAL AND BSO      TONSILLECTOMY      UPPER GASTROINTESTINAL ENDOSCOPY      UPPER GASTROINTESTINAL ENDOSCOPY  2014    UPPER GASTROINTESTINAL ENDOSCOPY  2016    mild gastritis    UPPER GASTROINTESTINAL ENDOSCOPY N/A 2018    retained thick secretions in proximal esophagus       FAMILY HISTORY       Family History   Problem Relation Age of Onset    Breast Cancer Mother     Cancer Mother         breast and uterine  39    Heart Disease Father     Heart Attack Father          28   [address] [facility] Maternal Grandmother     Cancer Brother 46        bronchogenic adenocarcinoma cancer    Lung Cancer Brother     Cancer Sister         lung    Lung Cancer Sister          72    Breast Cancer Sister          62    Cancer Sister breast       SOCIAL HISTORY       Social History     Socioeconomic History    Marital status:      Spouse name: Not on file    Number of children: Not on file    Years of education: Not on file    Highest education level: Not on file   Occupational History    Occupation: retired   Tobacco Use    Smoking status: Former Smoker     Packs/day: 0.50     Years: 20.00     Pack years: 10.00     Quit date: 1982     Years since quittin.6    Smokeless tobacco: Former User     Quit date: 1982   Vaping Use    Vaping Use: Never used   Substance and Sexual Activity    Alcohol use: No    Drug use: No    Sexual activity: Not on file   Other Topics Concern    Not on file   Social History Narrative    Not on file     Social Determinants of Health     Financial Resource Strain:     Difficulty of Paying Living Expenses: Not on file   Food Insecurity:     Worried About [address] in the Last Year: Not on file    [address] in the Last Year: Not on file   Transportation Needs:     Lack of Transportation (Medical): Not on file    Lack of Transportation (Non-Medical):  Not on file   Physical Activity:     Days of Exercise per Week: Not on file    Minutes of Exercise per Session: Not on file   Stress:     Feeling of Stress : Not on file   Social Connections:     Frequency of Communication with Friends and Family: Not on file    Frequency of Social Gatherings with Friends and Family: Not on file    Attends Hoahaoism Services: Not on file    Active Member of Clubs or Organizations: Not on file    Attends Club or Organization Meetings: Not on file    Marital Status: Not on file   Intimate Partner Violence:     Fear of Current or Ex-Partner: Not on file    Emotionally Abused: Not on file    Physically Abused: Not on file    Sexually Abused: Not on file   Housing Stability:     Unable to Pay for Housing in the Last Year: Not on file    Number of [place] in the Last Year: Not on file    Unstable Housing in the Last Year: Not on file           REVIEW OF SYSTEMS      Allergies   Allergen Reactions    Iodides Anaphylaxis, Hives and Itching     \"Contrast dyes\"  This was added by someone but Patient states has had IVP dyes multiple times without problems and had a myelogram in Dec 2016 without problems    Povidone-Iodine Anaphylaxis, Hives and Swelling    Sulfa Antibiotics Hives and Swelling     Other reaction(s): Unknown  Other reaction(s): Intolerance-unknown  Hands, feet, mouth, eyes  Other reaction(s): Unknown    Betadine [Povidone Iodine] Hives    Cephalexin Hives and Swelling    Cephalexin Itching     Other reaction(s): Hallucinations  In 1969 received demerol and keflex together so was told to list both as allergies    Cozaar [Losartan] Nausea Only     Pt also gets sores on toungue    Cymbalta [Duloxetine Hcl] Diarrhea and Nausea And Vomiting     Weakness    Demerol Hives and Swelling    Doxycycline Other (See Comments)     Sore mouth        Meperidine Itching     Other reaction(s): Hallucinations      Morphine Hives and Itching    Penicillins Hives, Swelling and Itching     Other reaction(s): Intolerance-unknown  Other reaction(s): Unknown    Zithromax [Azithromycin] Other (See Comments)     Sore mouth      Clindamycin/Lincomycin     Etodolac     Fluorescein     Iodine      Other reaction(s):  Intolerance-unknown    Lisinopril      cough    Penicillin G     Soap     Toviaz [Fesoterodine Fumarate Er]     Clonazepam Other (See Comments)     From neuro: made her too sleepy    Metformin And Related Nausea And Vomiting     Diarrhea and N/V       Current Outpatient Medications on File Prior to Encounter   Medication Sig Dispense Refill    primidone (MYSOLINE) 50 MG tablet Take 50 mg by mouth 2 times daily      benzonatate (TESSALON) 200 MG capsule Take 1 capsule by mouth 3 times daily as needed for Cough 30 capsule 0    fluconazole (DIFLUCAN) 150 MG tablet Take 1 tablet by mouth daily 7 tablet 0    D-Mannose 350 MG CAPS Take 300 mg by mouth daily 30 capsule 3    LIVALO 2 MG TABS tablet Take 1 tablet by mouth nightly 30 tablet 3    nystatin-triamcinolone (MYCOLOG II) 461421-5.1 UNIT/GM-% cream Apply topically 4 times daily Apply topically 4 times daily. 30 g 3    lubiprostone (AMITIZA) 8 MCG CAPS capsule Take 1 capsule by mouth daily 30 capsule 1    nitrofurantoin, macrocrystal-monohydrate, (MACROBID) 100 MG capsule Take 1 capsule by mouth daily 30 capsule 11    irbesartan (AVAPRO) 75 MG tablet TAKE 1 TABLET BY MOUTH ONE TIME A DAY  30 tablet 5    lidocaine (XYLOCAINE) 2 % jelly APPLY TOPICALLY AS NEEDED      Wheat Dextrin (BENEFIBER DRINK MIX PO) Take by mouth 2 tablespoons every night       albuterol (PROVENTIL) (5 MG/ML) 0.5% nebulizer solution Inhale 0.5 mLs into the lungs as needed       nystatin (MYCOSTATIN) 571398 UNIT/GM powder Apply 3 times daily.  45 g 3    esomeprazole (NEXIUM) 40 MG delayed release capsule Take 1 capsule by mouth every morning (before breakfast) 90 capsule 3    hydrocortisone (ANUSOL-HC) 2.5 % CREA rectal cream Place rectally 2 times daily 28 g 3    vitamin B-12 (CYANOCOBALAMIN) 1000 MCG tablet Take 1 tablet by mouth once a week 30 tablet 3    aspirin 81 MG EC tablet Take 1 tablet by mouth 2 times daily 30 tablet 3    rOPINIRole (REQUIP) 1 MG tablet Take by mouth 2 times daily       glipiZIDE (GLUCOTROL) 5 MG tablet 2.5mg in the am and 2.5 mg in the pm      Continuous Blood Gluc Sensor (FREESTYLE DAVIS 14 DAY SENSOR) MISC       Multiple Vitamins-Minerals (THERAPEUTIC MULTIVITAMIN-MINERALS) tablet Take 1 tablet by mouth daily       Multiple Vitamins-Minerals (PRESERVISION AREDS PO) Take by mouth daily       Continuous Blood Gluc  (FREESTYLE DAVIS 14 DAY READER) MATTHEW       donepezil (ARICEPT) 10 MG tablet Take 10 mg by mouth nightly       estradiol (ESTRACE VAGINAL) 0.1 MG/GM vaginal cream Place 1 g vaginally Twice a Week (Patient taking differently: Place 1 g vaginally every 14 days ) 1 Tube 5    CRANBERRY PO Take by mouth 2 times daily       Current Facility-Administered Medications on File Prior to Encounter   Medication Dose Route Frequency Provider Last Rate Last Admin    0.9 % sodium chloride infusion 250 mL  250 mL IntraVENous Once Carly Mccord MD           Negative except for what is mentioned in the HPI. GENERAL PHYSICAL EXAM     Vitals :   See vital signs in RN flow sheet. GENERAL APPEARANCE:   Chiki Edwards is [de-identified] y.o., female, moderately obese, nourished, conscious, alert. Does not appear to be distress or pain at this time. SKIN:  Warm, dry, no cyanosis or jaundice. HEAD:  Normocephalic, atraumatic, no swelling or tenderness. EYES:  Pupils equal, reactive to light. EARS:  No discharge, no marked hearing loss. NOSE:  No rhinorrhea, epistaxis or septal deformity. THROAT:  Not congested. No ulceration bleeding or discharge. NECK:  No stiffness, trachea central.  No palpable masses or L.N.                 CHEST:  Symmetrical and equal on expansion. HEART:  RRR . No audible murmurs or gallops. LUNGS:  Equal on expansion, normal breath sounds. No adventitious sounds. ABDOMEN:  Obese. Soft on palpation. No dysphagia, No localized tenderness. No guarding or rigidity. LYMPHATICS:  No palpable cervical lymphadenopathy. LOCOMOTOR, BACK AND SPINE:  No tenderness or deformities. EXTREMITIES:  Symmetrical, no pretibial edema. No discoloration or ulcerations. NEUROLOGIC:  The patient is conscious, alert, oriented,Cranial nerve II-XII intact, taste and smell were not examined. No apparent focal sensory or motor deficits.              PROVISIONAL DIAGNOSES / SURGERY:      IR MYELOGRAM 2 OR MORE LEVELS LUMBAR AND CERVICAL     Low back pain, unspecified back pain laterality     Acquired spondylolisthesis     Post laminectomy syndrome     Foraminal stenosis of cervical region      Patient Active Problem List    Diagnosis Date Noted    Urethral pain 04/23/2021    Bladder spasms 04/23/2021    Memory loss 12/08/2020    Type 2 diabetes mellitus with diabetic peripheral angiopathy without gangrene, without long-term current use of insulin ([address] 11/23/2020    Primary osteoarthritis of left knee 07/14/2020    Extrarenal pelvis 12/16/2019    Nausea 10/09/2018    Diarrhea 10/09/2018    Anxiety 10/03/2018    Arthritis 10/03/2018    Unsteadiness 07/05/2018    Urinary retention 10/02/2017    Chronic [facility] 04/12/2017    S/P lumbar fusion 03/22/2017    Urge incontinence 12/14/2016    Internal hemorrhoid     Tubular adenoma     Colon polyps     Hiatal hernia     Diabetic gastroparesis ([address]     Pure hypercholesterolemia 03/01/2016    Recurrent UTI 11/30/2015    Essential hypertension 10/14/2015    Gastroesophageal reflux disease without esophagitis 10/14/2015    Mixed incontinence 10/14/2015    Bilateral renal cysts 08/31/2015    Acquired cyst of kidney 08/03/2015    Microscopic hematuria 08/03/2015    Age-related cognitive decline     DDD (degenerative disc disease), cervical 07/06/2015    Anemia 01/13/2015    Vitamin D deficiency 09/18/2014    B12 deficiency     Thrombocytopenia ([address] 07/15/2014    Constipation 05/15/2014    Macular degeneration of left eye     Diabetic peripheral neuropathy ([address] 10/24/2012    TIA (transient ischemic attack)     Chronic back pain            HILARIO STOKES, APRN - CNP on 1/20/2022 at 8:01 AM

## 2022-01-20 NOTE — PROGRESS NOTES
Low back prepped draped. Numbed and accessed by Dr Mason Dalton. Injected with 10 ml of isovue- m 300.  Needle removed and band aid applied Tolerated well To CT Report called to [facility]

## 2022-01-20 NOTE — BRIEF OP NOTE
Brief Postoperative Note    Erle December  YOB: 1940  [id number]    Pre-operative Diagnosis: Cervical and Lumbar back pain    Post-operative Diagnosis: Same    Procedure: Cervical and Lumbar Myelogram    Anesthesia: Local    Surgeons/Assistants: Dr. Bonny Soler    Estimated Blood Loss: less than 50     Complications: None    Specimens: Was Not Obtained    Findings: Successful myelogram. Pt to CT for continued imaging.     Electronically signed by Vipul Horne MD on 1/20/2022 at 9:56 AM

## 2022-02-02 ENCOUNTER — HOSPITAL ENCOUNTER (OUTPATIENT)
Age: 82
Setting detail: SPECIMEN
Discharge: HOME | End: 2022-02-02

## 2022-02-02 ENCOUNTER — HOSPITAL ENCOUNTER (OUTPATIENT)
Age: 82
Discharge: HOME | End: 2022-02-02
Payer: MEDICARE

## 2022-02-02 ENCOUNTER — OFFICE VISIT (OUTPATIENT)
Dept: PRIMARY CARE CLINIC | Age: 82
End: 2022-02-02
Payer: MEDICARE

## 2022-02-02 ENCOUNTER — HOSPITAL ENCOUNTER (OUTPATIENT)
Age: 82
Discharge: HOME | End: 2022-02-04
Payer: MEDICARE

## 2022-02-02 ENCOUNTER — HOSPITAL ENCOUNTER (OUTPATIENT)
Dept: GENERAL RADIOLOGY | Age: 82
Discharge: HOME | End: 2022-02-04
Payer: MEDICARE

## 2022-02-02 VITALS
DIASTOLIC BLOOD PRESSURE: 88 MMHG | SYSTOLIC BLOOD PRESSURE: 142 MMHG | OXYGEN SATURATION: 97 % | HEIGHT: 59 IN | BODY MASS INDEX: 31.73 KG/M2 | HEART RATE: 88 BPM | WEIGHT: 157.4 LBS

## 2022-02-02 DIAGNOSIS — R06.02 SHORTNESS OF BREATH: ICD-10-CM

## 2022-02-02 DIAGNOSIS — J20.9 ACUTE BRONCHITIS, UNSPECIFIED ORGANISM: ICD-10-CM

## 2022-02-02 DIAGNOSIS — R06.02 SHORTNESS OF BREATH: Primary | ICD-10-CM

## 2022-02-02 LAB
ABSOLUTE EOS #: 0.15 K/UL (ref 0–0.4)
ABSOLUTE IMMATURE GRANULOCYTE: ABNORMAL K/UL (ref 0–0.3)
ABSOLUTE LYMPH #: 1 K/UL (ref 1–4.8)
ABSOLUTE MONO #: 0.4 K/UL (ref 0.1–1.3)
BASOPHILS # BLD: 1 % (ref 0–2)
BASOPHILS ABSOLUTE: 0.05 K/UL (ref 0–0.2)
BNP INTERPRETATION: NORMAL
DIFFERENTIAL TYPE: ABNORMAL
EOSINOPHILS RELATIVE PERCENT: 3 % (ref 0–4)
HCT VFR BLD CALC: 38.9 % (ref 36–46)
HEMOGLOBIN: 12.7 G/DL (ref 12–16)
IMMATURE GRANULOCYTES: ABNORMAL %
LYMPHOCYTES # BLD: 20 % (ref 24–44)
MCH RBC QN AUTO: 29.6 PG (ref 26–34)
MCHC RBC AUTO-ENTMCNC: 32.7 G/DL (ref 31–37)
MCV RBC AUTO: 90.7 FL (ref 80–100)
MONOCYTES # BLD: 8 % (ref 1–7)
MORPHOLOGY: ABNORMAL
NRBC AUTOMATED: ABNORMAL PER 100 WBC
PDW BLD-RTO: 14.5 % (ref 11.5–14.9)
PLATELET # BLD: 124 K/UL (ref 150–450)
PLATELET ESTIMATE: ABNORMAL
PMV BLD AUTO: 12.8 FL (ref 6–12)
PRO-BNP: 168 PG/ML
RBC # BLD: 4.29 M/UL (ref 4–5.2)
RBC # BLD: ABNORMAL 10*6/UL
SEG NEUTROPHILS: 68 % (ref 36–66)
SEGMENTED NEUTROPHILS ABSOLUTE COUNT: 3.4 K/UL (ref 1.3–9.1)
WBC # BLD: 5 K/UL (ref 3.5–11)
WBC # BLD: ABNORMAL 10*3/UL

## 2022-02-02 PROCEDURE — G8400 PT W/DXA NO RESULTS DOC: HCPCS | Performed by: FAMILY MEDICINE

## 2022-02-02 PROCEDURE — 99213 OFFICE O/P EST LOW 20 MIN: CPT | Performed by: FAMILY MEDICINE

## 2022-02-02 PROCEDURE — 36415 COLL VENOUS BLD VENIPUNCTURE: CPT

## 2022-02-02 PROCEDURE — G8484 FLU IMMUNIZE NO ADMIN: HCPCS | Performed by: FAMILY MEDICINE

## 2022-02-02 PROCEDURE — 71046 X-RAY EXAM CHEST 2 VIEWS: CPT

## 2022-02-02 PROCEDURE — 4040F PNEUMOC VAC/ADMIN/RCVD: CPT | Performed by: FAMILY MEDICINE

## 2022-02-02 PROCEDURE — 1123F ACP DISCUSS/DSCN MKR DOCD: CPT | Performed by: FAMILY MEDICINE

## 2022-02-02 PROCEDURE — G8417 CALC BMI ABV UP PARAM F/U: HCPCS | Performed by: FAMILY MEDICINE

## 2022-02-02 PROCEDURE — 85025 COMPLETE CBC W/AUTO DIFF WBC: CPT

## 2022-02-02 PROCEDURE — 83880 ASSAY OF NATRIURETIC PEPTIDE: CPT

## 2022-02-02 PROCEDURE — G8427 DOCREV CUR MEDS BY ELIG CLIN: HCPCS | Performed by: FAMILY MEDICINE

## 2022-02-02 PROCEDURE — 1090F PRES/ABSN URINE INCON ASSESS: CPT | Performed by: FAMILY MEDICINE

## 2022-02-02 PROCEDURE — 1036F TOBACCO NON-USER: CPT | Performed by: FAMILY MEDICINE

## 2022-02-02 RX ORDER — PREDNISONE 10 MG/1
TABLET ORAL
COMMUNITY
Start: 2022-01-24 | End: 2022-09-02

## 2022-02-02 SDOH — ECONOMIC STABILITY: FOOD INSECURITY: WITHIN THE PAST 12 MONTHS, YOU WORRIED THAT YOUR FOOD WOULD RUN OUT BEFORE YOU GOT MONEY TO BUY MORE.: NEVER TRUE

## 2022-02-02 SDOH — ECONOMIC STABILITY: FOOD INSECURITY: WITHIN THE PAST 12 MONTHS, THE FOOD YOU BOUGHT JUST DIDN'T LAST AND YOU DIDN'T HAVE MONEY TO GET MORE.: NEVER TRUE

## 2022-02-02 ASSESSMENT — SOCIAL DETERMINANTS OF HEALTH (SDOH): HOW HARD IS IT FOR YOU TO PAY FOR THE VERY BASICS LIKE FOOD, HOUSING, MEDICAL CARE, AND HEATING?: NOT HARD AT ALL

## 2022-02-02 ASSESSMENT — ENCOUNTER SYMPTOMS: SHORTNESS OF BREATH: 1

## 2022-02-02 NOTE — PROGRESS NOTES
Katiana Rodriguez is a [de-identified] y.o. femalewho presents today for her medical conditions/complaints as noted below. Chief Complaint   Patient presents with    Shortness of Breath     Pt c/o SOB that is getting worse x2-3 weeks         HPI:     HPI  Saw Dr Tavia Victor for SOB: has seen him yesterday and and saw someone else in his office 2 weeks ago. SOB and more chest discomfort since she had a myleogram and the zoey hier lay on her stomach for a long time. He's ordered CXR and COVID test and Levaquin and prednisone and tessilon perles. Current Outpatient Medications   Medication Sig Dispense Refill    primidone (MYSOLINE) 50 MG tablet Take 50 mg by mouth 2 times daily      benzonatate (TESSALON) 200 MG capsule Take 1 capsule by mouth 3 times daily as needed for Cough 30 capsule 0    fluconazole (DIFLUCAN) 150 MG tablet Take 1 tablet by mouth daily 7 tablet 0    D-Mannose 350 MG CAPS Take 300 mg by mouth daily 30 capsule 3    LIVALO 2 MG TABS tablet Take 1 tablet by mouth nightly 30 tablet 3    nystatin-triamcinolone (MYCOLOG II) 821474-4.1 UNIT/GM-% cream Apply topically 4 times daily Apply topically 4 times daily. 30 g 3    lubiprostone (AMITIZA) 8 MCG CAPS capsule Take 1 capsule by mouth daily 30 capsule 1    nitrofurantoin, macrocrystal-monohydrate, (MACROBID) 100 MG capsule Take 1 capsule by mouth daily 30 capsule 11    irbesartan (AVAPRO) 75 MG tablet TAKE 1 TABLET BY MOUTH ONE TIME A DAY  30 tablet 5    lidocaine (XYLOCAINE) 2 % jelly APPLY TOPICALLY AS NEEDED      Wheat Dextrin (BENEFIBER DRINK MIX PO) Take by mouth 2 tablespoons every night       albuterol (PROVENTIL) (5 MG/ML) 0.5% nebulizer solution Inhale 0.5 mLs into the lungs as needed       nystatin (MYCOSTATIN) 393044 UNIT/GM powder Apply 3 times daily.  45 g 3    esomeprazole (NEXIUM) 40 MG delayed release capsule Take 1 capsule by mouth every morning (before breakfast) 90 capsule 3    hydrocortisone (ANUSOL-HC) 2.5 % CREA rectal cream Place rectally 2 times daily 28 g 3    vitamin B-12 (CYANOCOBALAMIN) 1000 MCG tablet Take 1 tablet by mouth once a week 30 tablet 3    aspirin 81 MG EC tablet Take 1 tablet by mouth 2 times daily 30 tablet 3    rOPINIRole (REQUIP) 1 MG tablet Take by mouth 2 times daily       glipiZIDE (GLUCOTROL) 5 MG tablet 2.5mg in the am and 2.5 mg in the pm      Continuous Blood Gluc Sensor (FREESTYLE DAVIS 14 DAY SENSOR) MISC       Multiple Vitamins-Minerals (THERAPEUTIC MULTIVITAMIN-MINERALS) tablet Take 1 tablet by mouth daily       Multiple Vitamins-Minerals (PRESERVISION AREDS PO) Take by mouth daily       Continuous Blood Gluc  (FREESTYLE DAVIS 14 DAY READER) MATTHEW       donepezil (ARICEPT) 10 MG tablet Take 10 mg by mouth nightly       estradiol (ESTRACE VAGINAL) 0.1 MG/GM vaginal cream Place 1 g vaginally Twice a Week (Patient taking differently: Place 1 g vaginally every 14 days ) 1 Tube 5    CRANBERRY PO Take by mouth 2 times daily      predniSONE (DELTASONE) 10 MG tablet TAKE 2 TABLETS BY MOUTH EVERY DAY FOR 5 DAYS       No current facility-administered medications for this visit. Facility-Administered Medications Ordered in Other Visits   Medication Dose Route Frequency Provider Last Rate Last Admin    0.9 % sodium chloride infusion 250 mL  250 mL IntraVENous Once Nat Branham MD         Allergies   Allergen Reactions    Iodides Anaphylaxis, Hives and Itching     \"Contrast dyes\"  This was added by someone but Patient states has had IVP dyes multiple times without problems and had a myelogram in Dec 2016 without problems    Povidone-Iodine Anaphylaxis, Hives and Swelling    Sulfa Antibiotics Hives and Swelling     Other reaction(s): Unknown  Other reaction(s):  Intolerance-unknown  Hands, feet, mouth, eyes  Other reaction(s): Unknown    Betadine [Povidone Iodine] Hives    Cephalexin Hives and Swelling    Cephalexin Itching     Other reaction(s): Hallucinations  In 1969 received demerol and keflex together so was told to list both as allergies    Cozaar [Losartan] Nausea Only     Pt also gets sores on toungue    Cymbalta [Duloxetine Hcl] Diarrhea and Nausea And Vomiting     Weakness    Demerol Hives and Swelling    Doxycycline Other (See Comments)     Sore mouth        Meperidine Itching     Other reaction(s): Hallucinations      Morphine Hives and Itching    Penicillins Hives, Swelling and Itching     Other reaction(s): Intolerance-unknown  Other reaction(s): Unknown    Zithromax [Azithromycin] Other (See Comments)     Sore mouth      Clindamycin/Lincomycin     Etodolac     Fluorescein     Iodine      Other reaction(s): Intolerance-unknown    Lisinopril      cough    Penicillin G     Soap     Toviaz [Fesoterodine Fumarate Er]     Clonazepam Other (See Comments)     From neuro: made her too sleepy    Metformin And Related Nausea And Vomiting     Diarrhea and N/V       Subjective:     Review of Systems   Constitutional: Positive for fatigue. Respiratory: Positive for shortness of breath. Objective:     BP (!) 142/88   Pulse 88   Ht 4' 11\" (1.499 m)   Wt 157 lb 6.4 oz (71.4 kg)   SpO2 97%   BMI 31.79 kg/m²   Physical Exam  Vitals and nursing note reviewed. Constitutional:       General: She is not in acute distress. Appearance: She is well-developed. She is not ill-appearing. HENT:      Head: Normocephalic and atraumatic. Right Ear: External ear normal.      Left Ear: External ear normal.   Eyes:      General: No scleral icterus. Right eye: No discharge. Left eye: No discharge. Conjunctiva/sclera: Conjunctivae normal.   Neck:      Thyroid: No thyromegaly. Trachea: No tracheal deviation. Cardiovascular:      Rate and Rhythm: Normal rate and regular rhythm. Heart sounds: Normal heart sounds. Pulmonary:      Effort: Pulmonary effort is normal. No respiratory distress. Breath sounds: Wheezing present. Comments: Mild wheezing in lower bases left more than right. Lymphadenopathy:      Cervical: No cervical adenopathy. Skin:     General: Skin is warm. Findings: No rash. Neurological:      Mental Status: She is alert and oriented to person, place, and time. Psychiatric:         Mood and Affect: Mood normal.         Behavior: Behavior normal.         Thought Content: Thought content normal.         Assessment:       Diagnosis Orders   1. Shortness of breath  COVID-19    Brain Natriuretic Peptide    CBC With Auto Differential        Plan:      No follow-ups on file.  the medications that her pulmonologist ordered yesterday. He also ordered a chest x-ray which I agree with. Check CBC and BNP  Orders Placed This Encounter   Procedures    COVID-19     Standing Status:   Future     Standing Expiration Date:   2/2/2023     Scheduling Instructions:      1) Due to current limited availability of the COVID-19 test, tests will be prioritized based on responses to questions above. Testing may be delayed due to volume. 2) Print and instruct patient to adhere to CDC home isolation program. (Link Above)              3) Set up or refer patient for a monitoring program.              4) Have patient sign up for and leverage User Replayt (if not previously done). Order Specific Question:   Is this test for diagnosis or screening? Answer:   Diagnosis of ill patient     Order Specific Question:   Symptomatic for COVID-19 as defined by CDC? Answer:   Yes     Order Specific Question:   Date of Symptom Onset     Answer:   1/26/2022     Order Specific Question:   Hospitalized for COVID-19? Answer:   No     Order Specific Question:   Admitted to ICU for COVID-19? Answer:   No     Order Specific Question:   Employed in healthcare setting? Answer:   No     Order Specific Question:   Resident in a congregate (group) care setting? Answer:   No     Order Specific Question:   Pregnant? Answer:   No     Order Specific Question:   Previously tested for COVID-19? Answer: Yes    Brain Natriuretic Peptide     Standing Status:   Future     Standing Expiration Date:   2/2/2023    CBC With Auto Differential     Standing Status:   Future     Standing Expiration Date:   2/2/2023     No orders of the defined types were placed in this encounter. Reviewed medications and possibleside effects.        Electronically signed by Consuelo Rinne, MD on 2/2/2022 at 10:43 AM

## 2022-02-03 LAB
SARS-COV-2: NORMAL
SARS-COV-2: NOT DETECTED
SOURCE: NORMAL

## 2022-02-07 PROBLEM — N95.2 ATROPHIC VAGINITIS: Status: ACTIVE | Noted: 2022-02-07

## 2022-02-08 ENCOUNTER — OFFICE VISIT (OUTPATIENT)
Dept: ORTHOPEDIC SURGERY | Age: 82
End: 2022-02-08
Payer: MEDICARE

## 2022-02-08 VITALS — RESPIRATION RATE: 14 BRPM | WEIGHT: 157 LBS | HEIGHT: 59 IN | BODY MASS INDEX: 31.65 KG/M2

## 2022-02-08 DIAGNOSIS — M43.10 ACQUIRED SPONDYLOLISTHESIS: Primary | ICD-10-CM

## 2022-02-08 DIAGNOSIS — M96.1 POST LAMINECTOMY SYNDROME: ICD-10-CM

## 2022-02-08 DIAGNOSIS — M48.02 FORAMINAL STENOSIS OF CERVICAL REGION: ICD-10-CM

## 2022-02-08 DIAGNOSIS — M54.12 CERVICAL RADICULOPATHY: ICD-10-CM

## 2022-02-08 DIAGNOSIS — M54.50 LOW BACK PAIN, UNSPECIFIED BACK PAIN LATERALITY, UNSPECIFIED CHRONICITY, UNSPECIFIED WHETHER SCIATICA PRESENT: ICD-10-CM

## 2022-02-08 DIAGNOSIS — M54.2 NECK PAIN: ICD-10-CM

## 2022-02-08 DIAGNOSIS — M50.30 DEGENERATIVE DISC DISEASE, CERVICAL: ICD-10-CM

## 2022-02-08 DIAGNOSIS — G56.03 BILATERAL CARPAL TUNNEL SYNDROME: ICD-10-CM

## 2022-02-08 PROCEDURE — 1036F TOBACCO NON-USER: CPT | Performed by: ORTHOPAEDIC SURGERY

## 2022-02-08 PROCEDURE — 1090F PRES/ABSN URINE INCON ASSESS: CPT | Performed by: ORTHOPAEDIC SURGERY

## 2022-02-08 PROCEDURE — G8400 PT W/DXA NO RESULTS DOC: HCPCS | Performed by: ORTHOPAEDIC SURGERY

## 2022-02-08 PROCEDURE — G8427 DOCREV CUR MEDS BY ELIG CLIN: HCPCS | Performed by: ORTHOPAEDIC SURGERY

## 2022-02-08 PROCEDURE — G8484 FLU IMMUNIZE NO ADMIN: HCPCS | Performed by: ORTHOPAEDIC SURGERY

## 2022-02-08 PROCEDURE — 1123F ACP DISCUSS/DSCN MKR DOCD: CPT | Performed by: ORTHOPAEDIC SURGERY

## 2022-02-08 PROCEDURE — 99213 OFFICE O/P EST LOW 20 MIN: CPT | Performed by: ORTHOPAEDIC SURGERY

## 2022-02-08 PROCEDURE — 4040F PNEUMOC VAC/ADMIN/RCVD: CPT | Performed by: ORTHOPAEDIC SURGERY

## 2022-02-08 PROCEDURE — G8417 CALC BMI ABV UP PARAM F/U: HCPCS | Performed by: ORTHOPAEDIC SURGERY

## 2022-02-08 NOTE — PROGRESS NOTES
Patient ID: Micah Ruiz is a [de-identified] y.o. female    Chief Compliant:  Chief Complaint   Patient presents with    Back Pain        Diagnostic imaging:  CT myelogram cervical spine age-appropriate multilevel cervical spondylosis and moderate foraminal stenosis no high-grade central stenosis    CT myelogram lumbar spine history of L1-2 PLIF nerve stimulator to left-sided 3 4 history of 5 1 posterior decompression and instrumented fusion grade 2 spondylolisthesis with some moderate foraminal stenosis      Assessment and Plan:  1. Acquired spondylolisthesis    2. Post laminectomy syndrome    3. Foraminal stenosis of cervical region    4. Low back pain, unspecified back pain laterality, unspecified chronicity, unspecified whether sciatica present    5. Degenerative disc disease, cervical    6. Cervical radiculopathy    7. Neck pain    8. Bilateral carpal tunnel syndrome      EMGs upper extremity     Refer to Dr. Daniele Shell for cervical epidural steroid injections -patient has been treated by Dr. Daniele Shell in the past    Follow up in 10 weeks    HPI:  This is a [de-identified] y.o. female who presents to the clinic today for CT myelogram results. Hx of nerve root stimulator by Dr. Daniele Shell with no relief    Patient with ongoing neck pain radiating to the right shoulder and hand. Patient also with ongoing lower back pain at the belt line radiating to th right lateral lower extremity    Patient also reports bilateral foot pain and decreased balance. Review of Systems   All other systems reviewed and are negative.       Past History:    Current Outpatient Medications:     predniSONE (DELTASONE) 10 MG tablet, TAKE 2 TABLETS BY MOUTH EVERY DAY FOR 5 DAYS, Disp: , Rfl:     primidone (MYSOLINE) 50 MG tablet, Take 50 mg by mouth 2 times daily, Disp: , Rfl:     benzonatate (TESSALON) 200 MG capsule, Take 1 capsule by mouth 3 times daily as needed for Cough, Disp: 30 capsule, Rfl: 0    fluconazole (DIFLUCAN) 150 MG tablet, Take 1 tablet by mouth daily, Disp: 7 tablet, Rfl: 0    D-Mannose 350 MG CAPS, Take 300 mg by mouth daily, Disp: 30 capsule, Rfl: 3    LIVALO 2 MG TABS tablet, Take 1 tablet by mouth nightly, Disp: 30 tablet, Rfl: 3    nystatin-triamcinolone (MYCOLOG II) 127400-4.1 UNIT/GM-% cream, Apply topically 4 times daily Apply topically 4 times daily. , Disp: 30 g, Rfl: 3    lubiprostone (AMITIZA) 8 MCG CAPS capsule, Take 1 capsule by mouth daily, Disp: 30 capsule, Rfl: 1    nitrofurantoin, macrocrystal-monohydrate, (MACROBID) 100 MG capsule, Take 1 capsule by mouth daily, Disp: 30 capsule, Rfl: 11    irbesartan (AVAPRO) 75 MG tablet, TAKE 1 TABLET BY MOUTH ONE TIME A DAY , Disp: 30 tablet, Rfl: 5    lidocaine (XYLOCAINE) 2 % jelly, APPLY TOPICALLY AS NEEDED, Disp: , Rfl:     Wheat Dextrin (BENEFIBER DRINK MIX PO), Take by mouth 2 tablespoons every night , Disp: , Rfl:     albuterol (PROVENTIL) (5 MG/ML) 0.5% nebulizer solution, Inhale 0.5 mLs into the lungs as needed , Disp: , Rfl:     nystatin (MYCOSTATIN) 391440 UNIT/GM powder, Apply 3 times daily. , Disp: 45 g, Rfl: 3    esomeprazole (NEXIUM) 40 MG delayed release capsule, Take 1 capsule by mouth every morning (before breakfast), Disp: 90 capsule, Rfl: 3    hydrocortisone (ANUSOL-HC) 2.5 % CREA rectal cream, Place rectally 2 times daily, Disp: 28 g, Rfl: 3    vitamin B-12 (CYANOCOBALAMIN) 1000 MCG tablet, Take 1 tablet by mouth once a week, Disp: 30 tablet, Rfl: 3    aspirin 81 MG EC tablet, Take 1 tablet by mouth 2 times daily, Disp: 30 tablet, Rfl: 3    rOPINIRole (REQUIP) 1 MG tablet, Take by mouth 2 times daily , Disp: , Rfl:     glipiZIDE (GLUCOTROL) 5 MG tablet, 2.5mg in the am and 2.5 mg in the pm, Disp: , Rfl:     Continuous Blood Gluc Sensor (mktgSTYLE DAVIS 14 DAY SENSOR) MISC, , Disp: , Rfl:     Multiple Vitamins-Minerals (THERAPEUTIC MULTIVITAMIN-MINERALS) tablet, Take 1 tablet by mouth daily , Disp: , Rfl:     Multiple Vitamins-Minerals (PRESERVISION AREDS PO), Take by mouth daily , Disp: , Rfl:     Continuous Blood Gluc  (FREESTYLE DAVIS 14 DAY READER) MATTHEW, , Disp: , Rfl:     donepezil (ARICEPT) 10 MG tablet, Take 10 mg by mouth nightly , Disp: , Rfl:     estradiol (ESTRACE VAGINAL) 0.1 MG/GM vaginal cream, Place 1 g vaginally Twice a Week (Patient taking differently: Place 1 g vaginally every 14 days ), Disp: 1 Tube, Rfl: 5    CRANBERRY PO, Take by mouth 2 times daily, Disp: , Rfl:   Allergies   Allergen Reactions    Iodides Anaphylaxis, Hives and Itching     \"Contrast dyes\"  This was added by someone but Patient states has had IVP dyes multiple times without problems and had a myelogram in Dec 2016 without problems    Povidone-Iodine Anaphylaxis, Hives and Swelling    Sulfa Antibiotics Hives and Swelling     Other reaction(s): Unknown  Other reaction(s): Intolerance-unknown  Hands, feet, mouth, eyes  Other reaction(s): Unknown    Betadine [Povidone Iodine] Hives    Cephalexin Hives and Swelling    Cephalexin Itching     Other reaction(s): Hallucinations  In 1969 received demerol and keflex together so was told to list both as allergies    Cozaar [Losartan] Nausea Only     Pt also gets sores on toungue    Cymbalta [Duloxetine Hcl] Diarrhea and Nausea And Vomiting     Weakness    Demerol Hives and Swelling    Doxycycline Other (See Comments)     Sore mouth        Meperidine Itching     Other reaction(s): Hallucinations      Morphine Hives and Itching    Penicillins Hives, Swelling and Itching     Other reaction(s): Intolerance-unknown  Other reaction(s): Unknown    Zithromax [Azithromycin] Other (See Comments)     Sore mouth      Clindamycin/Lincomycin     Etodolac     Fluorescein     Iodine      Other reaction(s):  Intolerance-unknown    Lisinopril      cough    Penicillin G     Soap     Toviaz [Fesoterodine Fumarate Er]     Clonazepam Other (See Comments)     From neuro: made her too sleepy    Metformin And Related Nausea And Vomiting     Diarrhea and N/V     Social History     Socioeconomic History    Marital status:      Spouse name: Not on file    Number of children: Not on file    Years of education: Not on file    Highest education level: Not on file   Occupational History    Occupation: retired   Tobacco Use    Smoking status: Former Smoker     Packs/day: 0.50     Years: 20.00     Pack years: 10.00     Quit date: 1982     Years since quittin.7    Smokeless tobacco: Former User     Quit date: 1982   Vaping Use    Vaping Use: Never used   Substance and Sexual Activity    Alcohol use: No    Drug use: No    Sexual activity: Not on file   Other Topics Concern    Not on file   Social History Narrative    Not on file     Social Determinants of Health     Financial Resource Strain: Low Risk     Difficulty of Paying Living Expenses: Not hard at all   Food Insecurity: No Food Insecurity    Worried About [address] in the Last Year: Never true    [facility] in the Last Year: Never true   Transportation Needs:     Lack of Transportation (Medical): Not on file    Lack of Transportation (Non-Medical):  Not on file   Physical Activity:     Days of Exercise per Week: Not on file    Minutes of Exercise per Session: Not on file   Stress:     Feeling of Stress : Not on file   Social Connections:     Frequency of Communication with Friends and Family: Not on file    Frequency of Social Gatherings with Friends and Family: Not on file    Attends [employer] Services: Not on file    Active Member of Clubs or Organizations: Not on file    Attends Club or Organization Meetings: Not on file    Marital Status: Not on file   Intimate Partner Violence:     Fear of Current or Ex-Partner: Not on file    Emotionally Abused: Not on file    Physically Abused: Not on file    Sexually Abused: Not on file   Housing Stability:     Unable to Pay for Housing in the Last Year: Not on file    Number of Places Lived in the Last Year: Not on file    Unstable Housing in the Last Year: Not on file     Past Medical History:   Diagnosis Date    Age-related cognitive decline     Ankle pain     Left ankle fracture in ortho boat.    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    left with urinary incontinenc post surgical    Caffeine use     2 coffee/day, 1-2 tea per week    Chronic back pain     Chronic ITP (idiopathic thrombocytopenia) (HCC)     CVA (cerebral infarction) 2008, 2010    balance issues, short term memory loss    Diabetic gastroparesis ([address]     Diverticular disease 1986    GERD (gastroesophageal reflux disease)     Hiatal hernia     Hip fracture ([address]     History of blood transfusion     History of decreased platelet count     Hyperlipemia     Hypertension     Internal hemorrhoid     Macular degeneration of left eye 1980's    S/P laser treatment    Nausea and vomiting     Neuropathy     Osteoarthritis     Ringing in ears     Self-catheterizes urinary bladder     6-7 times daily    TIA (transient ischemic attack) 2000    x1    Tubular adenoma     colon polyps    Type II or unspecified type diabetes mellitus without mention of complication, not stated as uncontrolled     Urinary incontinence     frequent UTI s/p infection, surgical dilatation lead to incontinence    Wears dentures     full on top, partial on bottom    Wears glasses      Past Surgical History:   Procedure Laterality Date    APPENDECTOMY  1962    BLADDER SURGERY      bladder stimulator    BLADDER SUSPENSION  2002    multiple    CARDIAC CATHETERIZATION  2008    no stents    CARDIOVASCULAR STRESS TEST  12/2014    CATARACT REMOVAL WITH IMPLANT Left 11/07/2017    [facility]    CATARACT REMOVAL WITH IMPLANT Right 11/28/2017    [facility]/Sulaiman    COLON SURGERY      colostomy reversal    COLONOSCOPY  04/07/2016    tubular adenoma x3; internal hemorrhoids    COLONOSCOPY N/A 2019    COLONOSCOPY DIAGNOSTIC performed by Christal Oden MD at [address] [facility]  2019    COLOSTOMY  1986    bowel obstruction/ rupture from diverticular disease, reversal 3 mos later    CYSTOSCOPY  2017    W/ 200IU Botox     FRACTURE SURGERY Right     hip    FRACTURE SURGERY Left     WRIST    FRACTURE SURGERY Left     ankle    HERNIA REPAIR      HYSTERECTOMY  1969    JOINT REPLACEMENT Bilateral 2000    BILAT KNEES    LUMBAR FUSION  2017    L2/L3    GA XCAPSL CTRC RMVL INSJ IO LENS PROSTH W/O ECP Left 2017    EYE CATARACT EMULSIFICATION IOL IMPLANT performed by Brenda Brady MD at [facility] W/O ECP Right 2017    EYE CATARACT EMULSIFICATION IOL IMPLANT performed by Brenda Brady MD at [facility] Right 10/27/2015    WITH POLY EXCHANGE BIOMET AND GPS APPLICATION    REVISION TOTAL KNEE ARTHROPLASTY Left 2020    KNEE TOTAL ARTHROPLASTY REVISION - POLY EXCHANGE performed by Sherian Leyden, MD at [place] [address]      fusion, did not take   [address]    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    NEAL AND BSO      TONSILLECTOMY  1978    UPPER GASTROINTESTINAL ENDOSCOPY      UPPER GASTROINTESTINAL ENDOSCOPY  2014    UPPER GASTROINTESTINAL ENDOSCOPY  2016    mild gastritis    UPPER GASTROINTESTINAL ENDOSCOPY N/A 2018    retained thick secretions in proximal esophagus     Family History   Problem Relation Age of Onset    Breast Cancer Mother     Cancer Mother         breast and uterine  39    Heart Disease Father     Heart Attack Father          28    Cancer Maternal Grandmother     Cancer Brother 46        bronchogenic adenocarcinoma cancer    Lung Cancer Brother     Cancer Sister         lung    Lung Cancer Sister          69    Breast Cancer Sister          62    Cancer Sister         breast        Physical Exam:  Vitals signs and nursing note reviewed. Constitutional:       Appearance: well-developed. HENT:      Head: Normocephalic and atraumatic. Nose: Nose normal.   Eyes:      Conjunctiva/sclera: Conjunctivae normal.   Neck:      Musculoskeletal: Normal range of motion and neck supple. Pulmonary:      Effort: Pulmonary effort is normal. No respiratory distress. Musculoskeletal:      Comments: Normal gait     Skin:     General: Skin is warm and dry. Neurological:      Mental Status: Alert and oriented to person, place, and time. Sensory: No sensory deficit. Psychiatric:         Behavior: Behavior normal.         Thought Content: Thought content normal.        Provider Attestation:  Evelin Bonner, personally performed the services described in this documentation. All medical record entries made by the scribe were at my direction and in my presence. I have reviewed the chart and discharge instructions and agree that the records reflect my personal performance and is accurate and complete. Hale Alstrom Carleene Gosselin, MD 22     Scribe Attestation:  By signing my name below, Lavon Oconnor, attest that this documentation has been prepared under the direction and in the presence of Dr. Jade Rojas. Electronically signed: Cary Angel, 22     Please note that this chart was generated using voice recognition Dragon dictation software. Although every effort was made to ensure the accuracy of this automated transcription, some errors in transcription may have occurred.

## 2022-02-22 RX ORDER — IRBESARTAN 75 MG/1
TABLET ORAL
Qty: 30 TABLET | Refills: 3 | Status: SHIPPED | OUTPATIENT
Start: 2022-02-22 | End: 2022-08-08

## 2022-02-24 ENCOUNTER — HOSPITAL ENCOUNTER (OUTPATIENT)
Dept: NEUROLOGY | Age: 82
Discharge: HOME | End: 2022-02-24
Payer: MEDICARE

## 2022-02-24 DIAGNOSIS — M54.50 LOW BACK PAIN, UNSPECIFIED BACK PAIN LATERALITY, UNSPECIFIED CHRONICITY, UNSPECIFIED WHETHER SCIATICA PRESENT: ICD-10-CM

## 2022-02-24 DIAGNOSIS — M48.02 FORAMINAL STENOSIS OF CERVICAL REGION: ICD-10-CM

## 2022-02-24 DIAGNOSIS — M43.10 ACQUIRED SPONDYLOLISTHESIS: ICD-10-CM

## 2022-02-24 DIAGNOSIS — M54.2 NECK PAIN: ICD-10-CM

## 2022-02-24 DIAGNOSIS — G56.03 BILATERAL CARPAL TUNNEL SYNDROME: ICD-10-CM

## 2022-02-24 DIAGNOSIS — M50.30 DEGENERATIVE DISC DISEASE, CERVICAL: ICD-10-CM

## 2022-02-24 DIAGNOSIS — M96.1 POST LAMINECTOMY SYNDROME: ICD-10-CM

## 2022-02-24 DIAGNOSIS — M54.12 CERVICAL RADICULOPATHY: ICD-10-CM

## 2022-02-24 PROCEDURE — 95910 NRV CNDJ TEST 7-8 STUDIES: CPT | Performed by: PHYSICAL MEDICINE & REHABILITATION

## 2022-02-24 PROCEDURE — 95886 MUSC TEST DONE W/N TEST COMP: CPT | Performed by: PHYSICAL MEDICINE & REHABILITATION

## 2022-03-08 ENCOUNTER — OFFICE VISIT (OUTPATIENT)
Dept: ORTHOPEDIC SURGERY | Age: 82
End: 2022-03-08
Payer: MEDICARE

## 2022-03-08 VITALS — HEIGHT: 59 IN | BODY MASS INDEX: 31.65 KG/M2 | WEIGHT: 157 LBS

## 2022-03-08 DIAGNOSIS — M96.1 POST LAMINECTOMY SYNDROME: ICD-10-CM

## 2022-03-08 DIAGNOSIS — M50.30 DEGENERATIVE DISC DISEASE, CERVICAL: Primary | ICD-10-CM

## 2022-03-08 DIAGNOSIS — M43.10 ACQUIRED SPONDYLOLISTHESIS: ICD-10-CM

## 2022-03-08 DIAGNOSIS — M54.12 CERVICAL RADICULOPATHY: ICD-10-CM

## 2022-03-08 DIAGNOSIS — M48.02 FORAMINAL STENOSIS OF CERVICAL REGION: ICD-10-CM

## 2022-03-08 PROCEDURE — G8417 CALC BMI ABV UP PARAM F/U: HCPCS | Performed by: ORTHOPAEDIC SURGERY

## 2022-03-08 PROCEDURE — 1123F ACP DISCUSS/DSCN MKR DOCD: CPT | Performed by: ORTHOPAEDIC SURGERY

## 2022-03-08 PROCEDURE — 4040F PNEUMOC VAC/ADMIN/RCVD: CPT | Performed by: ORTHOPAEDIC SURGERY

## 2022-03-08 PROCEDURE — G8427 DOCREV CUR MEDS BY ELIG CLIN: HCPCS | Performed by: ORTHOPAEDIC SURGERY

## 2022-03-08 PROCEDURE — 99213 OFFICE O/P EST LOW 20 MIN: CPT | Performed by: ORTHOPAEDIC SURGERY

## 2022-03-08 PROCEDURE — 1090F PRES/ABSN URINE INCON ASSESS: CPT | Performed by: ORTHOPAEDIC SURGERY

## 2022-03-08 PROCEDURE — 1036F TOBACCO NON-USER: CPT | Performed by: ORTHOPAEDIC SURGERY

## 2022-03-08 PROCEDURE — G8400 PT W/DXA NO RESULTS DOC: HCPCS | Performed by: ORTHOPAEDIC SURGERY

## 2022-03-08 PROCEDURE — G8484 FLU IMMUNIZE NO ADMIN: HCPCS | Performed by: ORTHOPAEDIC SURGERY

## 2022-03-08 NOTE — PROGRESS NOTES
Patient ID: Fatou Eason is a [de-identified] y.o. female    Chief Compliant:  No chief complaint on file. Diagnostic imaging:  Diagnostic       x-rays AP lateral lumbar spine as well as CT myelogram lumbar spine again reviewed patient with history of L4 to sacrum decompression posterior lateral fusion L1-2 PLIF peripheral nerve stimulator right greater than left hip arthritis status post percutaneous pinning right hip    CT myelogram cervical spine multilevel foraminal stenosis no high-grade central stenosis multilevel disc space collapse    EMGs consistent with radiculopathy    Assessment and Plan:  1. Degenerative disc disease, cervical    2. Cervical radiculopathy    3. Foraminal stenosis of cervical region    4. Post laminectomy syndrome    5. Acquired spondylolisthesis      Refer to Dr. Romana Shaw for cervical epidural steroid injections    Although patient with moderate hip arthritis right greater than left at this time symptoms are is much from her chronic back condition is they are from her hip would recommend expectant observation    Nothing further to offer the patient with respect to her lumbar spine or cervical spine    Follow up 6 months    HPI:  This is a [de-identified] y.o. female who presents to the clinic today for EMG results. Patient with ongoing neck pain radiating to the right shoulder and hand. Patient also reports ongoing lower back pain at the belt line. Pain is aggravated by standing for extended periods of time. Her pain is worst when bending over to pick something up. She has not underwent JUAN RAMON with Dr. Romana Shaw as she has had difficulty contacting them. Review of Systems   All other systems reviewed and are negative.       Past History:    Current Outpatient Medications:     irbesartan (AVAPRO) 75 MG tablet, TAKE 1 TABLET BY MOUTH EVERY DAY, Disp: 30 tablet, Rfl: 3    predniSONE (DELTASONE) 10 MG tablet, TAKE 2 TABLETS BY MOUTH EVERY DAY FOR 5 DAYS, Disp: , Rfl:     primidone (MYSOLINE) 50 MG tablet, Take 50 mg by mouth 2 times daily, Disp: , Rfl:     benzonatate (TESSALON) 200 MG capsule, Take 1 capsule by mouth 3 times daily as needed for Cough, Disp: 30 capsule, Rfl: 0    fluconazole (DIFLUCAN) 150 MG tablet, Take 1 tablet by mouth daily, Disp: 7 tablet, Rfl: 0    D-Mannose 350 MG CAPS, Take 300 mg by mouth daily, Disp: 30 capsule, Rfl: 3    LIVALO 2 MG TABS tablet, Take 1 tablet by mouth nightly, Disp: 30 tablet, Rfl: 3    nystatin-triamcinolone (MYCOLOG II) 034589-3.1 UNIT/GM-% cream, Apply topically 4 times daily Apply topically 4 times daily. , Disp: 30 g, Rfl: 3    lubiprostone (AMITIZA) 8 MCG CAPS capsule, Take 1 capsule by mouth daily, Disp: 30 capsule, Rfl: 1    nitrofurantoin, macrocrystal-monohydrate, (MACROBID) 100 MG capsule, Take 1 capsule by mouth daily, Disp: 30 capsule, Rfl: 11    lidocaine (XYLOCAINE) 2 % jelly, APPLY TOPICALLY AS NEEDED, Disp: , Rfl:     Wheat Dextrin (BENEFIBER DRINK MIX PO), Take by mouth 2 tablespoons every night , Disp: , Rfl:     albuterol (PROVENTIL) (5 MG/ML) 0.5% nebulizer solution, Inhale 0.5 mLs into the lungs as needed , Disp: , Rfl:     nystatin (MYCOSTATIN) 468728 UNIT/GM powder, Apply 3 times daily. , Disp: 45 g, Rfl: 3    esomeprazole (NEXIUM) 40 MG delayed release capsule, Take 1 capsule by mouth every morning (before breakfast), Disp: 90 capsule, Rfl: 3    hydrocortisone (ANUSOL-HC) 2.5 % CREA rectal cream, Place rectally 2 times daily, Disp: 28 g, Rfl: 3    vitamin B-12 (CYANOCOBALAMIN) 1000 MCG tablet, Take 1 tablet by mouth once a week, Disp: 30 tablet, Rfl: 3    aspirin 81 MG EC tablet, Take 1 tablet by mouth 2 times daily, Disp: 30 tablet, Rfl: 3    rOPINIRole (REQUIP) 1 MG tablet, Take by mouth 2 times daily , Disp: , Rfl:     glipiZIDE (GLUCOTROL) 5 MG tablet, 2.5mg in the am and 2.5 mg in the pm, Disp: , Rfl:     Continuous Blood Gluc Sensor (AMS VariCodeSTYLE DAVIS 14 DAY SENSOR) MISC, , Disp: , Rfl:     Multiple Vitamins-Minerals (THERAPEUTIC MULTIVITAMIN-MINERALS) tablet, Take 1 tablet by mouth daily , Disp: , Rfl:     Multiple Vitamins-Minerals (PRESERVISION AREDS PO), Take by mouth daily , Disp: , Rfl:     Continuous Blood Gluc  (FREESTYLE DAVIS 14 DAY READER) MATTHEW, , Disp: , Rfl:     donepezil (ARICEPT) 10 MG tablet, Take 10 mg by mouth nightly , Disp: , Rfl:     estradiol (ESTRACE VAGINAL) 0.1 MG/GM vaginal cream, Place 1 g vaginally Twice a Week (Patient taking differently: Place 1 g vaginally every 14 days ), Disp: 1 Tube, Rfl: 5    CRANBERRY PO, Take by mouth 2 times daily, Disp: , Rfl:   Allergies   Allergen Reactions    Iodides Anaphylaxis, Hives and Itching     \"Contrast dyes\"  This was added by someone but Patient states has had IVP dyes multiple times without problems and had a myelogram in Dec 2016 without problems    Povidone-Iodine Anaphylaxis, Hives and Swelling    Sulfa Antibiotics Hives and Swelling     Other reaction(s): Unknown  Other reaction(s): Intolerance-unknown  Hands, feet, mouth, eyes  Other reaction(s): Unknown    Betadine [Povidone Iodine] Hives    Cephalexin Hives and Swelling    Cephalexin Itching     Other reaction(s): Hallucinations  In 1969 received demerol and keflex together so was told to list both as allergies    Cozaar [Losartan] Nausea Only     Pt also gets sores on toungue    Cymbalta [Duloxetine Hcl] Diarrhea and Nausea And Vomiting     Weakness    Demerol Hives and Swelling    Doxycycline Other (See Comments)     Sore mouth        Meperidine Itching     Other reaction(s): Hallucinations      Morphine Hives and Itching    Penicillins Hives, Swelling and Itching     Other reaction(s): Intolerance-unknown  Other reaction(s): Unknown    Zithromax [Azithromycin] Other (See Comments)     Sore mouth      Clindamycin/Lincomycin     Etodolac     Fluorescein     Iodine      Other reaction(s):  Intolerance-unknown    Lisinopril      cough    Penicillin G     Soap     Toviaz [Fesoterodine Fumarate Er]     Clonazepam Other (See Comments)     From neuro: made her too sleepy    Metformin And Related Nausea And Vomiting     Diarrhea and N/V     Social History     Socioeconomic History    Marital status:      Spouse name: Not on file    Number of children: Not on file    Years of education: Not on file    Highest education level: Not on file   Occupational History    Occupation: retired   Tobacco Use    Smoking status: Former Smoker     Packs/day: 0.50     Years: 20.00     Pack years: 10.00     Quit date: 1982     Years since quittin.7    Smokeless tobacco: Former User     Quit date: 1982   Vaping Use    Vaping Use: Never used   Substance and Sexual Activity    Alcohol use: No    Drug use: No    Sexual activity: Not on file   Other Topics Concern    Not on file   Social History Narrative    Not on file     Social Determinants of Health     Financial Resource Strain: Low Risk     Difficulty of Paying Living Expenses: Not hard at all   Food Insecurity: No Food Insecurity    Worried About [address] in the Last Year: Never true    [address] in the Last Year: Never true   Transportation Needs:     Lack of Transportation (Medical): Not on file    Lack of Transportation (Non-Medical):  Not on file   Physical Activity:     Days of Exercise per Week: Not on file    Minutes of Exercise per Session: Not on file   Stress:     Feeling of Stress : Not on file   Social Connections:     Frequency of Communication with Friends and Family: Not on file    Frequency of Social Gatherings with Friends and Family: Not on file    Attends Denominational Services: Not on file    Active Member of Clubs or Organizations: Not on file    Attends Club or Organization Meetings: Not on file    Marital Status: Not on file   Intimate Partner Violence:     Fear of Current or Ex-Partner: Not on file    Emotionally Abused: Not on file    Physically Abused: Not on file    Sexually Abused: Not on file   Housing Stability:     Unable to Pay for Housing in the Last Year: Not on file    Number of Places Lived in the Last Year: Not on file    Unstable Housing in the Last Year: Not on file     Past Medical History:   Diagnosis Date    Age-related cognitive decline     Ankle pain     Left ankle fracture in ortho boat.    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    left with urinary incontinenc post surgical    Caffeine use     2 coffee/day, 1-2 tea per week    Chronic back pain     Chronic ITP (idiopathic thrombocytopenia) (HCC)     CVA (cerebral infarction) 2008, 2010    balance issues, short term memory loss    Diabetic gastroparesis ([address]     Diverticular disease 1986    GERD (gastroesophageal reflux disease)     Hiatal hernia     [facility]     History of blood transfusion     History of decreased platelet count     Hyperlipemia     Hypertension     Internal hemorrhoid     Macular degeneration of left eye 1980's    S/P laser treatment    Nausea and vomiting     Neuropathy     Osteoarthritis     Ringing in ears     Self-catheterizes urinary bladder     6-7 times daily    TIA (transient ischemic attack) 2000    x1    Tubular adenoma     colon polyps    Type II or unspecified type diabetes mellitus without mention of complication, not stated as uncontrolled     Urinary incontinence     frequent UTI s/p infection, surgical dilatation lead to incontinence    Wears dentures     full on top, partial on bottom    Wears glasses      Past Surgical History:   Procedure Laterality Date    APPENDECTOMY  1962    BLADDER SURGERY      bladder stimulator    BLADDER SUSPENSION  2002    multiple    CARDIAC CATHETERIZATION  2008    no stents    CARDIOVASCULAR STRESS TEST  12/2014    CATARACT REMOVAL WITH IMPLANT Left 11/07/2017    Tyree/Sulaiman    CATARACT REMOVAL WITH IMPLANT Right 2017    Tyree/Sulaiman    COLON SURGERY      colostomy reversal    COLONOSCOPY  2016    tubular adenoma x3; internal hemorrhoids    COLONOSCOPY N/A 2019    COLONOSCOPY DIAGNOSTIC performed by Liza Fu MD at [address] [facility]  2019    COLOSTOMY  1986    bowel obstruction/ rupture from diverticular disease, reversal 3 mos later    CYSTOSCOPY  2017    W/ 200IU Botox     FRACTURE SURGERY Right     hip    FRACTURE SURGERY Left     WRIST    FRACTURE SURGERY Left     ankle    HERNIA REPAIR      HYSTERECTOMY  1969    JOINT REPLACEMENT Bilateral     BILAT KNEES    LUMBAR FUSION  2017    L2/L3    UT XCAPSL CTRC RMVL INSJ IO LENS PROSTH W/O ECP Left 2017    EYE CATARACT EMULSIFICATION IOL IMPLANT performed by Pérez Lange MD at [facility] W/O ECP Right 2017    EYE CATARACT EMULSIFICATION IOL IMPLANT performed by Pérez Lange MD at [facility] Right 10/27/2015    WITH POLY EXCHANGE BIOMET AND GPS APPLICATION    REVISION TOTAL KNEE ARTHROPLASTY Left 2020    KNEE TOTAL ARTHROPLASTY REVISION - POLY EXCHANGE performed by Rosemary Shoemaker MD at [address] [facility]      fusion, did not take   [address]    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    NEAL AND BSO      TONSILLECTOMY      UPPER GASTROINTESTINAL ENDOSCOPY      UPPER GASTROINTESTINAL ENDOSCOPY  2014    UPPER GASTROINTESTINAL ENDOSCOPY  2016    mild gastritis    UPPER GASTROINTESTINAL ENDOSCOPY N/A 2018    retained thick secretions in proximal esophagus     Family History   Problem Relation Age of Onset    Breast Cancer Mother     Cancer Mother         breast and uterine  39    Heart Disease Father     Heart Attack Father          28    Cancer Maternal Grandmother     Cancer Brother 46 bronchogenic adenocarcinoma cancer    Lung Cancer Brother     Cancer Sister         lung    Lung Cancer Sister          72    Breast Cancer Sister          62    Cancer Sister         breast        Physical Exam:  Vitals signs and nursing note reviewed. Constitutional:       Appearance: well-developed. HENT:      Head: Normocephalic and atraumatic. Nose: Nose normal.   Eyes:      Conjunctiva/sclera: Conjunctivae normal.   Neck:      Musculoskeletal: Normal range of motion and neck supple. Pulmonary:      Effort: Pulmonary effort is normal. No respiratory distress. Musculoskeletal:      Comments: Normal gait     Skin:     General: Skin is warm and dry. Neurological:      Mental Status: Alert and oriented to person, place, and time. Sensory: No sensory deficit. Psychiatric:         Behavior: Behavior normal.         Thought Content: Thought content normal.        Provider Attestation:  Eva Bonner, personally performed the services described in this documentation. All medical record entries made by the scribe were at my direction and in my presence. I have reviewed the chart and discharge instructions and agree that the records reflect my personal performance and is accurate and complete. Danilo De Anda MD 3/8/22     Scribe Attestation:  By signing my name below, Yosi Villanueva, attest that this documentation has been prepared under the direction and in the presence of Dr. Mortimer Milan. Electronically signed: Cary Schulz, 3/8/22     Please note that this chart was generated using voice recognition Dragon dictation software. Although every effort was made to ensure the accuracy of this automated transcription, some errors in transcription may have occurred.

## 2022-03-18 ENCOUNTER — TELEPHONE (OUTPATIENT)
Dept: PRIMARY CARE CLINIC | Age: 82
End: 2022-03-18

## 2022-03-18 NOTE — TELEPHONE ENCOUNTER
Patient states after getting groceries she started having left foot pain and swelling from her toes up to her ankle. She said there is a red spot on the top of her foot and the veins are really dark blue. It is the same foot she broke a few years ago. She did not twist it or anything. She is having to use her hernandez and walker in the house, lots of pain with weight bearing. Has elevated and iced as much as possible.

## 2022-03-20 ENCOUNTER — APPOINTMENT (OUTPATIENT)
Dept: GENERAL RADIOLOGY | Age: 82
End: 2022-03-20
Payer: MEDICARE

## 2022-03-20 ENCOUNTER — HOSPITAL ENCOUNTER (EMERGENCY)
Dept: VASCULAR LAB | Age: 82
Discharge: HOME | End: 2022-03-20
Payer: MEDICARE

## 2022-03-20 ENCOUNTER — HOSPITAL ENCOUNTER (EMERGENCY)
Age: 82
Discharge: HOME | End: 2022-03-20
Attending: EMERGENCY MEDICINE
Payer: MEDICARE

## 2022-03-20 VITALS
BODY MASS INDEX: 31.65 KG/M2 | OXYGEN SATURATION: 95 % | HEIGHT: 59 IN | DIASTOLIC BLOOD PRESSURE: 78 MMHG | HEART RATE: 95 BPM | SYSTOLIC BLOOD PRESSURE: 135 MMHG | RESPIRATION RATE: 16 BRPM | TEMPERATURE: 98.4 F | WEIGHT: 157 LBS

## 2022-03-20 DIAGNOSIS — S92.515A CLOSED NONDISPLACED FRACTURE OF PROXIMAL PHALANX OF LESSER TOE OF LEFT FOOT, INITIAL ENCOUNTER: Primary | ICD-10-CM

## 2022-03-20 LAB
ABSOLUTE EOS #: 0.32 K/UL (ref 0–0.4)
ABSOLUTE LYMPH #: 0.81 K/UL (ref 1–4.8)
ABSOLUTE MONO #: 0.41 K/UL (ref 0.1–1.3)
ALBUMIN SERPL-MCNC: 4.4 G/DL (ref 3.5–5.2)
ALP BLD-CCNC: 77 U/L (ref 35–104)
ALT SERPL-CCNC: 16 U/L (ref 5–33)
ANION GAP SERPL CALCULATED.3IONS-SCNC: 14 MMOL/L (ref 9–17)
AST SERPL-CCNC: 17 U/L
BASOPHILS # BLD: 2 % (ref 0–2)
BASOPHILS ABSOLUTE: 0.09 K/UL (ref 0–0.2)
BILIRUB SERPL-MCNC: 0.54 MG/DL (ref 0.3–1.2)
BUN BLDV-MCNC: 19 MG/DL (ref 8–23)
CALCIUM SERPL-MCNC: 9.3 MG/DL (ref 8.6–10.4)
CHLORIDE BLD-SCNC: 101 MMOL/L (ref 98–107)
CO2: 21 MMOL/L (ref 20–31)
CREAT SERPL-MCNC: 0.72 MG/DL (ref 0.5–0.9)
D-DIMER QUANTITATIVE: 1.62 MG/L FEU (ref 0–0.59)
EOSINOPHILS RELATIVE PERCENT: 7 % (ref 0–4)
GFR AFRICAN AMERICAN: >60 ML/MIN
GFR NON-AFRICAN AMERICAN: >60 ML/MIN
GFR SERPL CREATININE-BSD FRML MDRD: ABNORMAL ML/MIN/{1.73_M2}
GLUCOSE BLD-MCNC: 213 MG/DL (ref 70–99)
HCT VFR BLD CALC: 37.4 % (ref 36–46)
HEMOGLOBIN: 12.3 G/DL (ref 12–16)
INR BLD: 1
LYMPHOCYTES # BLD: 18 % (ref 24–44)
MCH RBC QN AUTO: 29.1 PG (ref 26–34)
MCHC RBC AUTO-ENTMCNC: 32.8 G/DL (ref 31–37)
MCV RBC AUTO: 88.7 FL (ref 80–100)
MONOCYTES # BLD: 9 % (ref 1–7)
MORPHOLOGY: NORMAL
PDW BLD-RTO: 14.2 % (ref 11.5–14.9)
PLATELET # BLD: 134 K/UL (ref 150–450)
PMV BLD AUTO: 12.1 FL (ref 6–12)
POTASSIUM SERPL-SCNC: 3.6 MMOL/L (ref 3.7–5.3)
PROTHROMBIN TIME: 13.1 SEC (ref 11.8–14.6)
RBC # BLD: 4.22 M/UL (ref 4–5.2)
SEG NEUTROPHILS: 64 % (ref 36–66)
SEGMENTED NEUTROPHILS ABSOLUTE COUNT: 2.87 K/UL (ref 1.3–9.1)
SODIUM BLD-SCNC: 136 MMOL/L (ref 135–144)
TOTAL PROTEIN: 6.8 G/DL (ref 6.4–8.3)
WBC # BLD: 4.5 K/UL (ref 3.5–11)

## 2022-03-20 PROCEDURE — 73610 X-RAY EXAM OF ANKLE: CPT

## 2022-03-20 PROCEDURE — 36415 COLL VENOUS BLD VENIPUNCTURE: CPT

## 2022-03-20 PROCEDURE — 85610 PROTHROMBIN TIME: CPT

## 2022-03-20 PROCEDURE — 99285 EMERGENCY DEPT VISIT HI MDM: CPT

## 2022-03-20 PROCEDURE — 6370000000 HC RX 637 (ALT 250 FOR IP): Performed by: EMERGENCY MEDICINE

## 2022-03-20 PROCEDURE — 85025 COMPLETE CBC W/AUTO DIFF WBC: CPT

## 2022-03-20 PROCEDURE — 80053 COMPREHEN METABOLIC PANEL: CPT

## 2022-03-20 PROCEDURE — 85379 FIBRIN DEGRADATION QUANT: CPT

## 2022-03-20 PROCEDURE — 93971 EXTREMITY STUDY: CPT

## 2022-03-20 PROCEDURE — 73630 X-RAY EXAM OF FOOT: CPT

## 2022-03-20 RX ORDER — ACETAMINOPHEN 500 MG
1000 TABLET ORAL ONCE
Status: COMPLETED | OUTPATIENT
Start: 2022-03-20 | End: 2022-03-20

## 2022-03-20 RX ORDER — ACETAMINOPHEN 500 MG
1000 TABLET ORAL EVERY 6 HOURS PRN
Qty: 60 TABLET | Refills: 0 | Status: SHIPPED | OUTPATIENT
Start: 2022-03-20

## 2022-03-20 RX ADMIN — ACETAMINOPHEN 1000 MG: 500 TABLET ORAL at 11:40

## 2022-03-20 ASSESSMENT — PAIN SCALES - GENERAL: PAINLEVEL_OUTOF10: 4

## 2022-03-20 NOTE — ED NOTES
Mode of arrival (squad #, walk in, police, etc) : Walk in        Chief complaint(s): Foot and leg swelling        Arrival Note (brief scenario, treatment PTA, etc). : Pt states that her left foot has been swelling. The swelling has begun to travel up her leg and is causing her pain since Tuesday evening. Patient states this has never happened to her before and the pain is debilitating to ambulation. Pt was seen by urgent care yesterday and was advised to go to the hospital, when pt arrived at [facility] yesterday she was unable to have a doppler but is requesting one be done today. Pt also states she had a previous break to the same affected ankle 5 years ago and states this feels similar. C= \"Have you ever felt that you should Cut down on your drinking? \"  No  A= \"Have people Annoyed you by criticizing your drinking? \"  No  G= \"Have you ever felt bad or Guilty about your drinking? \"  No  E= \"Have you ever had a drink as an Eye-opener first thing in the morning to steady your nerves or to help a hangover? \"  No      Deferred []      Reason for deferring: N/A    *If yes to two or more: probable alcohol abuse. *     Afsaneh Davis RN  03/20/22 1300 Odell Feldman RN  03/20/22 [id number]

## 2022-03-20 NOTE — ED TRIAGE NOTES
Patient to emergency department with complaints of left foot swelling/pain ongoing for 4 days. Pt states she was seen at an urgent care and told to get a doppler.

## 2022-03-20 NOTE — ED PROVIDER NOTES
EMERGENCY DEPARTMENT ENCOUNTER    Pt Name: Loni Oden  MRN: [id number]  [place] 1940  Date of evaluation: 3/20/22  CHIEF COMPLAINT       Chief Complaint   Patient presents with    Foot Swelling     HISTORY OF PRESENT ILLNESS     Foot Problem  Location:  Ankle and foot  Time since incident:  6 days  Lower extremity injury: was on feet a lot this week helping family, her 's sister  Tuesday, she was on her feet all day. Ankle location:  L ankle  Foot location:  L foot  Pain details:     Quality:  Aching    Radiates to:  L leg    Severity:  Moderate    Onset quality:  Gradual    Duration:  6 days    Timing:  Constant    Progression:  Waxing and waning  Chronicity:  New  Relieved by:  Nothing  Worsened by:  Nothing  Ineffective treatments:  None tried  Associated symptoms: stiffness and swelling    Associated symptoms: no fever            REVIEW OF SYSTEMS     Review of Systems   Constitutional: Negative for fever. Musculoskeletal: Positive for stiffness. All other systems reviewed and are negative. PASTMEDICAL HISTORY     Past Medical History:   Diagnosis Date    Age-related cognitive decline     Ankle pain     Left ankle fracture in ortho boat.    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 urinary incontinenc post surgical    Caffeine use     2 coffee/day, 1-2 tea per week    Chronic back pain     Chronic ITP (idiopathic thrombocytopenia) (HCC)     CVA (cerebral infarction) ,     balance issues, short term memory loss    Diabetic gastroparesis ([address]     Diverticular disease     GERD (gastroesophageal reflux disease)     Hiatal hernia     Hip fracture (HCC)     History of blood transfusion     History of decreased platelet count     Hyperlipemia     Hypertension     Internal hemorrhoid     Macular degeneration of left eye     S/P laser treatment    Nausea and vomiting     Neuropathy     Osteoarthritis     Ringing in ears     Self-catheterizes urinary bladder     6-7 times daily    TIA (transient ischemic attack) 2000    x1    Tubular adenoma     colon polyps    Type II or unspecified type diabetes mellitus without mention of complication, not stated as uncontrolled     Urinary incontinence     frequent UTI s/p infection, surgical dilatation lead to incontinence    Wears dentures     full on top, partial on bottom    Wears glasses      Past Problem List  Patient Active Problem List   Diagnosis Code    TIA (transient ischemic attack) G45.9    Chronic back pain M54.9, G89.29    Diabetic peripheral neuropathy ([address] E11.42    Macular degeneration of left eye H35.30    Constipation K59.00    Thrombocytopenia ([facility]) D69.6    B12 deficiency E53.8    Vitamin D deficiency E55.9    Anemia D64.9    DDD (degenerative disc disease), cervical M50.30    Age-related cognitive decline R41.81    Acquired cyst of kidney N28.1    Microscopic hematuria R31.29    Bilateral renal cysts N28.1    Essential hypertension I10    Gastroesophageal reflux disease without esophagitis K21.9    Mixed incontinence N39.46    Recurrent UTI N39.0    Pure hypercholesterolemia E78.00    Hiatal hernia K44.9    Diabetic gastroparesis ([facility]) E11.43, K31.84    Internal hemorrhoid K64.8    Tubular adenoma D36.9    Colon polyps K63.5    Urge incontinence N39.41    S/P lumbar fusion Z98.1    Chronic ITP (idiopathic thrombocytopenia) ([facility]) D69.3    Urinary retention R33.9    Unsteadiness R26.81    Anxiety F41.9    Arthritis M19.90    Nausea R11.0    Diarrhea R19.7    Extrarenal pelvis [id number]    Primary osteoarthritis of left knee M17.12    Type 2 diabetes mellitus with diabetic peripheral angiopathy without gangrene, without long-term current use of insulin ([facility]) E11.51    Memory loss R41.3    Urethral pain R39.89    Bladder spasms N32.89    Atrophic vaginitis N95.2     SURGICAL HISTORY       Past Surgical History:   Procedure Laterality Date    APPENDECTOMY  1962    BLADDER SURGERY      bladder stimulator    BLADDER SUSPENSION  2002    multiple    CARDIAC CATHETERIZATION  2008    no stents    CARDIOVASCULAR STRESS TEST  12/2014    CATARACT REMOVAL WITH IMPLANT Left 11/07/2017    [facility]    CATARACT REMOVAL WITH IMPLANT Right 11/28/2017    [facility]    COLON SURGERY      colostomy reversal    COLONOSCOPY  04/07/2016    tubular adenoma x3; internal hemorrhoids    COLONOSCOPY N/A 11/06/2019    COLONOSCOPY DIAGNOSTIC performed by Gui Grande MD at [facility]  11/06/2019    COLOSTOMY  1986    bowel obstruction/ rupture from diverticular disease, reversal 3 mos later    CYSTOSCOPY  09/08/2017    W/ 200IU Botox     FRACTURE SURGERY Right 2011    hip    FRACTURE SURGERY Left 2001    [facility]    FRACTURE SURGERY Left 2013    ankle    HERNIA REPAIR      HYSTERECTOMY  1969    JOINT REPLACEMENT Bilateral 2000    BILAT KNEES    LUMBAR FUSION  2017    L2/L3    MD XCAPSL CTRC RMVL INSJ IO LENS PROSTH W/O ECP Left 11/07/2017    EYE CATARACT EMULSIFICATION IOL IMPLANT performed by Vilma Rivera MD at [facility] W/O ECP Right 11/28/2017    EYE CATARACT EMULSIFICATION IOL IMPLANT performed by Vilma Rivera MD at [facility] Right 10/27/2015    WITH POLY EXCHANGE BIOMET AND GPS APPLICATION    REVISION TOTAL KNEE ARTHROPLASTY Left 07/14/2020    KNEE TOTAL ARTHROPLASTY REVISION - POLY EXCHANGE performed by Shandra Pfeiffer MD at [address] [facility]  2010    fusion, did not take   [address]    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    NEAL AND BSO      TONSILLECTOMY  1978    UPPER GASTROINTESTINAL ENDOSCOPY      UPPER GASTROINTESTINAL ENDOSCOPY  05/05/2014    UPPER GASTROINTESTINAL ENDOSCOPY  04/07/2016    mild gastritis    UPPER GASTROINTESTINAL ENDOSCOPY N/A 07/17/2018    retained thick secretions in proximal esophagus     CURRENT MEDICATIONS       Previous Medications    ALBUTEROL (PROVENTIL) (5 MG/ML) 0.5% NEBULIZER SOLUTION    Inhale 0.5 mLs into the lungs as needed     ASPIRIN 81 MG EC TABLET    Take 1 tablet by mouth 2 times daily    BENZONATATE (TESSALON) 200 MG CAPSULE    Take 1 capsule by mouth 3 times daily as needed for Cough    CONTINUOUS BLOOD GLUC  (FREESTYLE DAVIS 14 DAY READER) MATTHEW        CONTINUOUS BLOOD GLUC SENSOR (FREESTYLE DAVIS 14 DAY SENSOR) MISC        CRANBERRY PO    Take by mouth 2 times daily    D-MANNOSE 350 MG CAPS    Take 300 mg by mouth daily    DONEPEZIL (ARICEPT) 10 MG TABLET    Take 10 mg by mouth nightly     ESOMEPRAZOLE (NEXIUM) 40 MG DELAYED RELEASE CAPSULE    Take 1 capsule by mouth every morning (before breakfast)    ESTRADIOL (ESTRACE VAGINAL) 0.1 MG/GM VAGINAL CREAM    Place 1 g vaginally Twice a Week    FLUCONAZOLE (DIFLUCAN) 150 MG TABLET    Take 1 tablet by mouth daily    GLIPIZIDE (GLUCOTROL) 5 MG TABLET    2.5mg in the am and 2.5 mg in the pm    HYDROCORTISONE (ANUSOL-HC) 2.5 % CREA RECTAL CREAM    Place rectally 2 times daily    IRBESARTAN (AVAPRO) 75 MG TABLET    TAKE 1 TABLET BY MOUTH EVERY DAY    LIDOCAINE (XYLOCAINE) 2 % JELLY    APPLY TOPICALLY AS NEEDED    LIVALO 2 MG TABS TABLET    Take 1 tablet by mouth nightly    LUBIPROSTONE (AMITIZA) 8 MCG CAPS CAPSULE    Take 1 capsule by mouth daily    MULTIPLE VITAMINS-MINERALS (PRESERVISION AREDS PO)    Take by mouth daily     MULTIPLE VITAMINS-MINERALS (THERAPEUTIC MULTIVITAMIN-MINERALS) TABLET    Take 1 tablet by mouth daily     NITROFURANTOIN, MACROCRYSTAL-MONOHYDRATE, (MACROBID) 100 MG CAPSULE    Take 1 capsule by mouth daily    NYSTATIN (MYCOSTATIN) 478142 UNIT/GM POWDER    Apply 3 times daily. NYSTATIN-TRIAMCINOLONE (MYCOLOG II) 374761-0.1 UNIT/GM-% CREAM    Apply topically 4 times daily Apply topically 4 times daily. discharge. Left eye: No discharge. Conjunctiva/sclera: Conjunctivae normal.      Pupils: Pupils are equal, round, and reactive to light. Neck:      Trachea: No tracheal deviation. Cardiovascular:      Rate and Rhythm: Normal rate and regular rhythm. Pulses: Normal pulses. Heart sounds: Normal heart sounds. Comments: Dp/pt 2+ left foot  Pulmonary:      Effort: Pulmonary effort is normal. No respiratory distress. Breath sounds: Normal breath sounds. No stridor. No wheezing or rales. Abdominal:      Palpations: Abdomen is soft. Tenderness: There is no abdominal tenderness. There is no guarding or rebound. Musculoskeletal:         General: Tenderness present. No deformity. Normal range of motion. Cervical back: Normal range of motion and neck supple. Left lower leg: Edema present. Comments: Left ankle and foot tenderness and mild swelling left foot ankle and leg   Skin:     General: Skin is warm and dry. Capillary Refill: Capillary refill takes less than 2 seconds. Left foot     Findings: No erythema or rash. Neurological:      General: No focal deficit present. Mental Status: She is alert and oriented to person, place, and time. Coordination: Coordination normal.   Psychiatric:         Mood and Affect: Mood normal.         Behavior: Behavior normal.         Thought Content:  Thought content normal.         Judgment: Judgment normal.         MEDICAL DECISION MAKING:       ED Course as of 03/20/22 1329   Sun Mar 20, 2022   1037 Positive ddimer  Ordering duplex scan left leg [WM]   1313 Duplex scan neg for dvt left leg  She has post op shoe  Дмитрий tape left little toe  Discussed with patient anticipatory guidance, discharge instructions, follow up PCP and podiatry 24 hours   [WM]      ED Course User Index  [WM] Syeda Kellogg MD       Procedures    DIAGNOSTIC RESULTS       RADIOLOGY:All plain film, CT, MRI, and formal ultrasound images (except ED bedside ultrasound) are read by the radiologist, see reports below, unless otherwisenoted in MDM or here. XR FOOT LEFT (MIN 3 VIEWS)   Final Result   Left ankle:      1. No acute fracture or dislocation. 2. Mild plantar calcaneal spur. Left foot:      1. Mild soft tissue edema of the left foot. 2. Interval subacute or remote fracture deformity of the proximal phalanx 5th   digit. Correlate with point tenderness. 3. Degenerative changes as detailed above. Mild plantar calcaneal spur. XR ANKLE LEFT (MIN 3 VIEWS)   Final Result   Left ankle:      1. No acute fracture or dislocation. 2. Mild plantar calcaneal spur. Left foot:      1. Mild soft tissue edema of the left foot. 2. Interval subacute or remote fracture deformity of the proximal phalanx 5th   digit. Correlate with point tenderness. 3. Degenerative changes as detailed above. Mild plantar calcaneal spur. VL DUP LOWER EXTREMITY VENOUS LEFT    (Results Pending)     LABS: All lab results were reviewed by myself, and all abnormals are listed below.   Labs Reviewed   CBC WITH AUTO DIFFERENTIAL - Abnormal; Notable for the following components:       Result Value    Platelets 088 (*)     MPV 12.1 (*)     Lymphocytes 18 (*)     Monocytes 9 (*)     Eosinophils % 7 (*)     Absolute Lymph # 0.81 (*)     All other components within normal limits   COMPREHENSIVE METABOLIC PANEL - Abnormal; Notable for the following components:    Glucose 213 (*)     Potassium 3.6 (*)     All other components within normal limits   D-DIMER, QUANTITATIVE - Abnormal; Notable for the following components:    D-Dimer, Quant 1.62 (*)     All other components within normal limits   PROTIME-INR       EMERGENCY DEPARTMENTCOURSE:         Vitals:    Vitals:    03/20/22 0924 03/20/22 1200   BP: 138/87 135/78   Pulse: 95    Resp: 16    Temp: 98.4 °F (36.9 °C)    SpO2: 98% 95%   Weight: 157 lb (71.2 kg)    Height: 4' 11\" (1.499 m)        The patient was given the following medications while in the emergency department:  Orders Placed This Encounter   Medications    acetaminophen (TYLENOL) tablet 1,000 mg    acetaminophen (TYLENOL) 500 MG tablet     Sig: Take 2 tablets by mouth every 6 hours as needed for Pain     Dispense:  60 tablet     Refill:  0       FINAL IMPRESSION      1. Closed nondisplaced fracture of proximal phalanx of lesser toe of left foot, initial encounter          DISPOSITION/PLAN   DISPOSITION Decision To Discharge 03/20/2022 01:14:08 PM      PATIENT REFERRED TO:  Cirilo Robles DPM  [address] [place] [address]  [phone]    Schedule an appointment as soon as possible for a visit in 1 day      DISCHARGE MEDICATIONS:  New Prescriptions    ACETAMINOPHEN (TYLENOL) 500 MG TABLET    Take 2 tablets by mouth every 6 hours as needed for Pain     The care is provided during an unprecedented national emergency due to the novel coronavirus, COVID 19.   MD Nae Novoa MD  03/20/22 9365

## 2022-03-20 NOTE — ED NOTES
Per MD instruction - distal toe on left foot was yuni tape wrapped and patient has her own ortho shoe to D/C home with     Simone Goldberg RN  03/20/22 3493

## 2022-03-29 RX ORDER — PITAVASTATIN CALCIUM 2.09 MG/1
TABLET, FILM COATED ORAL
Qty: 30 TABLET | Refills: 0 | Status: SHIPPED | OUTPATIENT
Start: 2022-03-29 | End: 2022-04-28

## 2022-04-19 ENCOUNTER — OFFICE VISIT (OUTPATIENT)
Dept: ORTHOPEDIC SURGERY | Age: 82
End: 2022-04-19
Payer: MEDICARE

## 2022-04-19 VITALS — BODY MASS INDEX: 31.65 KG/M2 | HEIGHT: 59 IN | RESPIRATION RATE: 14 BRPM | WEIGHT: 157 LBS

## 2022-04-19 DIAGNOSIS — M43.10 ACQUIRED SPONDYLOLISTHESIS: Primary | ICD-10-CM

## 2022-04-19 DIAGNOSIS — M54.12 CERVICAL RADICULOPATHY: ICD-10-CM

## 2022-04-19 DIAGNOSIS — M48.02 FORAMINAL STENOSIS OF CERVICAL REGION: ICD-10-CM

## 2022-04-19 DIAGNOSIS — M96.1 POST LAMINECTOMY SYNDROME: ICD-10-CM

## 2022-04-19 DIAGNOSIS — M50.30 DEGENERATIVE DISC DISEASE, CERVICAL: ICD-10-CM

## 2022-04-19 PROCEDURE — G8427 DOCREV CUR MEDS BY ELIG CLIN: HCPCS | Performed by: ORTHOPAEDIC SURGERY

## 2022-04-19 PROCEDURE — G8417 CALC BMI ABV UP PARAM F/U: HCPCS | Performed by: ORTHOPAEDIC SURGERY

## 2022-04-19 PROCEDURE — 99213 OFFICE O/P EST LOW 20 MIN: CPT | Performed by: ORTHOPAEDIC SURGERY

## 2022-04-19 PROCEDURE — 1036F TOBACCO NON-USER: CPT | Performed by: ORTHOPAEDIC SURGERY

## 2022-04-19 PROCEDURE — 1123F ACP DISCUSS/DSCN MKR DOCD: CPT | Performed by: ORTHOPAEDIC SURGERY

## 2022-04-19 PROCEDURE — 4040F PNEUMOC VAC/ADMIN/RCVD: CPT | Performed by: ORTHOPAEDIC SURGERY

## 2022-04-19 PROCEDURE — 1090F PRES/ABSN URINE INCON ASSESS: CPT | Performed by: ORTHOPAEDIC SURGERY

## 2022-04-19 PROCEDURE — G8400 PT W/DXA NO RESULTS DOC: HCPCS | Performed by: ORTHOPAEDIC SURGERY

## 2022-04-28 RX ORDER — PITAVASTATIN CALCIUM 2.09 MG/1
TABLET, FILM COATED ORAL
Qty: 30 TABLET | Refills: 5 | Status: SHIPPED | OUTPATIENT
Start: 2022-04-28

## 2022-05-12 ENCOUNTER — HOSPITAL ENCOUNTER (EMERGENCY)
Age: 82
Discharge: HOME | End: 2022-05-12
Attending: EMERGENCY MEDICINE
Payer: MEDICARE

## 2022-05-12 ENCOUNTER — APPOINTMENT (OUTPATIENT)
Dept: GENERAL RADIOLOGY | Age: 82
End: 2022-05-12
Payer: MEDICARE

## 2022-05-12 VITALS
OXYGEN SATURATION: 98 % | RESPIRATION RATE: 18 BRPM | HEART RATE: 80 BPM | HEIGHT: 58 IN | TEMPERATURE: 97.5 F | BODY MASS INDEX: 30.64 KG/M2 | DIASTOLIC BLOOD PRESSURE: 76 MMHG | SYSTOLIC BLOOD PRESSURE: 150 MMHG | WEIGHT: 146 LBS

## 2022-05-12 DIAGNOSIS — S91.012A LACERATION OF LEFT ANKLE, INITIAL ENCOUNTER: Primary | ICD-10-CM

## 2022-05-12 PROCEDURE — 12002 RPR S/N/AX/GEN/TRNK2.6-7.5CM: CPT

## 2022-05-12 PROCEDURE — 90715 TDAP VACCINE 7 YRS/> IM: CPT | Performed by: STUDENT IN AN ORGANIZED HEALTH CARE EDUCATION/TRAINING PROGRAM

## 2022-05-12 PROCEDURE — 99284 EMERGENCY DEPT VISIT MOD MDM: CPT

## 2022-05-12 PROCEDURE — 90471 IMMUNIZATION ADMIN: CPT | Performed by: STUDENT IN AN ORGANIZED HEALTH CARE EDUCATION/TRAINING PROGRAM

## 2022-05-12 PROCEDURE — 73610 X-RAY EXAM OF ANKLE: CPT

## 2022-05-12 PROCEDURE — 6360000002 HC RX W HCPCS: Performed by: STUDENT IN AN ORGANIZED HEALTH CARE EDUCATION/TRAINING PROGRAM

## 2022-05-12 PROCEDURE — 2500000003 HC RX 250 WO HCPCS: Performed by: STUDENT IN AN ORGANIZED HEALTH CARE EDUCATION/TRAINING PROGRAM

## 2022-05-12 RX ORDER — LIDOCAINE HYDROCHLORIDE 10 MG/ML
5 INJECTION, SOLUTION INFILTRATION; PERINEURAL ONCE
Status: COMPLETED | OUTPATIENT
Start: 2022-05-12 | End: 2022-05-12

## 2022-05-12 RX ADMIN — TETANUS TOXOID, REDUCED DIPHTHERIA TOXOID AND ACELLULAR PERTUSSIS VACCINE, ADSORBED 0.5 ML: 5; 2.5; 8; 8; 2.5 SUSPENSION INTRAMUSCULAR at 22:16

## 2022-05-12 RX ADMIN — LIDOCAINE HYDROCHLORIDE 5 ML: 10 INJECTION, SOLUTION INFILTRATION; PERINEURAL at 22:15

## 2022-05-12 ASSESSMENT — ENCOUNTER SYMPTOMS
VOMITING: 0
COLOR CHANGE: 0
NAUSEA: 0
CHOKING: 0
SORE THROAT: 0
SHORTNESS OF BREATH: 0
EYES NEGATIVE: 1
APNEA: 0
ABDOMINAL DISTENTION: 0

## 2022-05-12 ASSESSMENT — PAIN - FUNCTIONAL ASSESSMENT: PAIN_FUNCTIONAL_ASSESSMENT: NONE - DENIES PAIN
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
## 2022-05-20 ENCOUNTER — TELEPHONE (OUTPATIENT)
Dept: PRIMARY CARE CLINIC | Age: 82
End: 2022-05-20

## 2022-05-20 ENCOUNTER — NURSE ONLY (OUTPATIENT)
Dept: PRIMARY CARE CLINIC | Age: 82
End: 2022-05-20

## 2022-05-20 DIAGNOSIS — Z48.02 VISIT FOR SUTURE REMOVAL: Primary | ICD-10-CM

## 2022-05-20 DIAGNOSIS — L08.9 WOUND INFECTION: Primary | ICD-10-CM

## 2022-05-20 DIAGNOSIS — T14.8XXA WOUND INFECTION: Primary | ICD-10-CM

## 2022-05-20 RX ORDER — CIPROFLOXACIN 250 MG/1
250 TABLET, FILM COATED ORAL 2 TIMES DAILY
Qty: 20 TABLET | Refills: 0 | Status: SHIPPED | OUTPATIENT
Start: 2022-05-20 | End: 2022-05-30

## 2022-05-20 NOTE — TELEPHONE ENCOUNTER
No increased warmth to touch. Around the wound is tender, does have some redness, start cipro  cipro can sometimes cause low sugars: monitor sugars closely and if needed decrease thy glipizide by 1/2 if sugar too low.

## 2022-05-20 NOTE — PROGRESS NOTES
Patient presents for suture removal.The sutures are removed. Wound care and activity instructions given. Return prn. 6 sutures removed. Per Janine Rios will send antibiotic for wound.  Pictures in Media

## 2022-05-20 NOTE — TELEPHONE ENCOUNTER
Patient was in office today for NV-- suture removal     Patient states she is using [employer] on [address]

## 2022-05-20 NOTE — TELEPHONE ENCOUNTER
Called patient to update her on which medication Dr Estiven Woodard sent to pharmacy.      Voicemail not set up

## 2022-05-26 ENCOUNTER — OFFICE VISIT (OUTPATIENT)
Dept: NEUROLOGY | Age: 82
End: 2022-05-26
Payer: MEDICARE

## 2022-05-26 VITALS
BODY MASS INDEX: 32.54 KG/M2 | WEIGHT: 155 LBS | HEART RATE: 78 BPM | DIASTOLIC BLOOD PRESSURE: 88 MMHG | SYSTOLIC BLOOD PRESSURE: 165 MMHG | HEIGHT: 58 IN

## 2022-05-26 DIAGNOSIS — M54.40 CHRONIC LOW BACK PAIN WITH SCIATICA, SCIATICA LATERALITY UNSPECIFIED, UNSPECIFIED BACK PAIN LATERALITY: ICD-10-CM

## 2022-05-26 DIAGNOSIS — M54.2 CHRONIC NECK PAIN: ICD-10-CM

## 2022-05-26 DIAGNOSIS — G89.29 CHRONIC NECK PAIN: ICD-10-CM

## 2022-05-26 DIAGNOSIS — G25.81 RESTLESS LEGS SYNDROME: ICD-10-CM

## 2022-05-26 DIAGNOSIS — G25.0 ESSENTIAL TREMOR: Primary | ICD-10-CM

## 2022-05-26 DIAGNOSIS — G89.29 CHRONIC LOW BACK PAIN WITH SCIATICA, SCIATICA LATERALITY UNSPECIFIED, UNSPECIFIED BACK PAIN LATERALITY: ICD-10-CM

## 2022-05-26 PROCEDURE — 1036F TOBACCO NON-USER: CPT | Performed by: PSYCHIATRY & NEUROLOGY

## 2022-05-26 PROCEDURE — 1090F PRES/ABSN URINE INCON ASSESS: CPT | Performed by: PSYCHIATRY & NEUROLOGY

## 2022-05-26 PROCEDURE — G8417 CALC BMI ABV UP PARAM F/U: HCPCS | Performed by: PSYCHIATRY & NEUROLOGY

## 2022-05-26 PROCEDURE — G8400 PT W/DXA NO RESULTS DOC: HCPCS | Performed by: PSYCHIATRY & NEUROLOGY

## 2022-05-26 PROCEDURE — 1123F ACP DISCUSS/DSCN MKR DOCD: CPT | Performed by: PSYCHIATRY & NEUROLOGY

## 2022-05-26 PROCEDURE — 99204 OFFICE O/P NEW MOD 45 MIN: CPT | Performed by: PSYCHIATRY & NEUROLOGY

## 2022-05-26 PROCEDURE — G8427 DOCREV CUR MEDS BY ELIG CLIN: HCPCS | Performed by: PSYCHIATRY & NEUROLOGY

## 2022-05-26 RX ORDER — PRIMIDONE 50 MG/1
TABLET ORAL
Qty: 30 TABLET | Refills: 3 | Status: SHIPPED | OUTPATIENT
Start: 2022-05-26 | End: 2022-09-02

## 2022-05-26 ASSESSMENT — ENCOUNTER SYMPTOMS
BACK PAIN: 1
RESPIRATORY NEGATIVE: 1
ALLERGIC/IMMUNOLOGIC NEGATIVE: 1
GASTROINTESTINAL NEGATIVE: 1
EYES NEGATIVE: 1

## 2022-05-26 NOTE — PROGRESS NOTES
[de-identified] yo wf with tremor. She reports that he has intermittent tremors for one year affecting more right hand . Tremor is more action or postural in type . There will be resting tremor to lesser degree . This will interfere when holding cup of coffee or getting glass of water and writing . There is some difficulty with right hand dexterity such as buttoning buttons . Tremor will get worse when anxious or nervous . There is no tremor in his legs or head . She was placed on mysoline 50 mg po bid through Dr Gulshan Correa taking this for 2 months that eased tremor complaints . Her grandfather had parkinson'sShe has chronic low back and neck pain. 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 There is chronic mid low back pain of aching component of grade 6 over 10 . There is mid neck pain grade 8 to 10 over 10 . For pain she will use tylenol ES having upcoming appointment with pain clinic . She has diabetic neuropathy with numbness to mid leg with some stabbing pain . Neurontin puts her to sleep . Her walking is slow and wobbly using cane and walker with occasional fall once every two months . Low back pain will aggravate with walking with some shuffle . There are some restless legs when sedentary on requip 4 mg po tid having seen Dr Gulshan Correa . She was placed on mysoline 50 mg po bid taking this for 2 months that eased tremor complaints  . Her grandfather had parkinson's , Significant medications previously on mysoline . Testing lumbar CT myelogram post surical cahnges lumbar spine . T12 -L2 laminctomy posterior spinal fusion instrumentation on the right at L1-L2 with inter disc spacer identified. There is 4-5 mm retrolisthesis of L1 on 2.  . Laminectomy L4-S1 . Left low back electrical stimulator device . Focal clumping cauda equina nerve roots L3-L4 .  CT cervical post mylogram multilevel cervical spondylosis resulting in upwards of moderate canal stenosis at C5-6 with abutment of the underlying cord , January 2022      Past Medical History:   Diagnosis Date    Age-related cognitive decline     Ankle pain     Left ankle fracture in ortho boat.    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    left with urinary incontinenc post surgical    Caffeine use     2 coffee/day, 1-2 tea per week    Chronic back pain     Chronic ITP (idiopathic thrombocytopenia) (HCC)     CVA (cerebral infarction) 2008, 2010    balance issues, short term memory loss    Diabetic gastroparesis ([address]     Diverticular disease 1986    GERD (gastroesophageal reflux disease)     Hiatal hernia     Hip fracture ([address]     History of blood transfusion     History of decreased platelet count     Hyperlipemia     Hypertension     Internal hemorrhoid     Macular degeneration of left eye 1980's    S/P laser treatment    Nausea and vomiting     Neuropathy     Osteoarthritis     Ringing in ears     Self-catheterizes urinary bladder     6-7 times daily    TIA (transient ischemic attack) 2000    x1    Tubular adenoma     colon polyps    Type II or unspecified type diabetes mellitus without mention of complication, not stated as uncontrolled     Urinary incontinence     frequent UTI s/p infection, surgical dilatation lead to incontinence    Wears dentures     full on top, partial on bottom    Wears glasses        Past Surgical History:   Procedure Laterality Date    APPENDECTOMY  1962    BLADDER SURGERY      bladder stimulator    BLADDER SUSPENSION  2002    multiple    CARDIAC CATHETERIZATION  2008    no stents    CARDIOVASCULAR STRESS TEST  12/2014    CATARACT REMOVAL WITH IMPLANT Left 11/07/2017    Raffoul/Sulaiman    CATARACT REMOVAL WITH IMPLANT Right 11/28/2017    Raffoalhaji/Sulaiman    COLON SURGERY      colostomy reversal    COLONOSCOPY  2016    tubular adenoma x3; internal hemorrhoids    COLONOSCOPY N/A 2019    COLONOSCOPY DIAGNOSTIC performed by Saadia Nathan MD at [address] [facility]  2019    COLOSTOMY  1986    bowel obstruction/ rupture from diverticular disease, reversal 3 mos later    CYSTOSCOPY  2017    W/ 200IU Botox     FRACTURE SURGERY Right     hip    FRACTURE SURGERY Left     WRIST    FRACTURE SURGERY Left 2013    ankle    HERNIA REPAIR      HYSTERECTOMY  1969    JOINT REPLACEMENT Bilateral 2000    BILAT KNEES    LUMBAR FUSION  2017    L2/L3    MD XCAPSL CTRC RMVL INSJ IO LENS PROSTH W/O ECP Left 2017    EYE CATARACT EMULSIFICATION IOL IMPLANT performed by Lucas Guzman MD at [facility]. W/O ECP Right 2017    EYE CATARACT EMULSIFICATION IOL IMPLANT performed by Lucas Guzman MD at [facility] Right 10/27/2015    WITH POLY EXCHANGE BIOMET AND GPS APPLICATION    REVISION TOTAL KNEE ARTHROPLASTY Left 2020    KNEE TOTAL ARTHROPLASTY REVISION - POLY EXCHANGE performed by Samir Jackson MD at [address] [facility]  2010    fusion, did not take   [address]    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    NEAL AND BSO      TONSILLECTOMY      UPPER GASTROINTESTINAL ENDOSCOPY      UPPER GASTROINTESTINAL ENDOSCOPY  2014    UPPER GASTROINTESTINAL ENDOSCOPY  2016    mild gastritis    UPPER GASTROINTESTINAL ENDOSCOPY N/A 2018    retained thick secretions in proximal esophagus       Family History   Problem Relation Age of Onset    Breast Cancer Mother     Cancer Mother         breast and uterine  39    Heart Disease Father     Heart Attack Father          28    Cancer Maternal Grandmother     Cancer Brother 46        bronchogenic adenocarcinoma cancer    Lung Cancer Brother     Cancer Sister lung    Lung Cancer Sister          72    Breast Cancer Sister          62    Cancer Sister         breast       Social History     Socioeconomic History    Marital status:      Spouse name: None    Number of children: None    Years of education: None    Highest education level: None   Occupational History    Occupation: retired   Tobacco Use    Smoking status: Former Smoker     Packs/day: 0.50     Years: 20.00     Pack years: 10.00     Quit date: 1982     Years since quittin.0    Smokeless tobacco: Former User     Quit date: 1982   Vaping Use    Vaping Use: Never used   Substance and Sexual Activity    Alcohol use: No    Drug use: No    Sexual activity: None   Other Topics Concern    None   Social History Narrative    None     Social Determinants of Health     Financial Resource Strain: Low Risk     Difficulty of Paying Living Expenses: Not hard at all   Food Insecurity: No Food Insecurity    Worried About [address] [employer] in the Last Year: Never true    [facility] in the Last Year: Never true   Transportation Needs:     Lack of Transportation (Medical): Not on file    Lack of Transportation (Non-Medical):  Not on file   Physical Activity:     Days of Exercise per Week: Not on file    Minutes of Exercise per Session: Not on file   Stress:     Feeling of Stress : Not on file   Social Connections:     Frequency of Communication with Friends and Family: Not on file    Frequency of Social Gatherings with Friends and Family: Not on file    Attends Hinduism Services: Not on file    Active Member of Clubs or Organizations: Not on file    Attends Club or Organization Meetings: Not on file    Marital Status: Not on file   Intimate Partner Violence:     Fear of Current or Ex-Partner: Not on file    Emotionally Abused: Not on file    Physically Abused: Not on file    Sexually Abused: Not on file   Housing Stability:     Unable to Pay for Housing in the Last Year: Not on file    Number of Places Lived in the Last Year: Not on file    Unstable Housing in the Last Year: Not on file       Current Outpatient Medications   Medication Sig Dispense Refill    primidone (MYSOLINE) 50 MG tablet Take 1 po qhs 30 tablet 3    ciprofloxacin (CIPRO) 250 MG tablet Take 1 tablet by mouth 2 times daily for 10 days 20 tablet 0    LIVALO 2 MG TABS tablet TAKE 1 TABLET BY MOUTH IN THE EVENING 30 tablet 5    acetaminophen (TYLENOL) 500 MG tablet Take 2 tablets by mouth every 6 hours as needed for Pain 60 tablet 0    irbesartan (AVAPRO) 75 MG tablet TAKE 1 TABLET BY MOUTH EVERY DAY 30 tablet 3    D-Mannose 350 MG CAPS Take 300 mg by mouth daily 30 capsule 3    nystatin-triamcinolone (MYCOLOG II) 203385-2.1 UNIT/GM-% cream Apply topically 4 times daily Apply topically 4 times daily. 30 g 3    lubiprostone (AMITIZA) 8 MCG CAPS capsule Take 1 capsule by mouth daily 30 capsule 1    nitrofurantoin, macrocrystal-monohydrate, (MACROBID) 100 MG capsule Take 1 capsule by mouth daily 30 capsule 11    lidocaine (XYLOCAINE) 2 % jelly APPLY TOPICALLY AS NEEDED      Wheat Dextrin (BENEFIBER DRINK MIX PO) Take by mouth 2 tablespoons every night       albuterol (PROVENTIL) (5 MG/ML) 0.5% nebulizer solution Inhale 0.5 mLs into the lungs as needed       nystatin (MYCOSTATIN) 199522 UNIT/GM powder Apply 3 times daily.  45 g 3    esomeprazole (NEXIUM) 40 MG delayed release capsule Take 1 capsule by mouth every morning (before breakfast) 90 capsule 3    hydrocortisone (ANUSOL-HC) 2.5 % CREA rectal cream Place rectally 2 times daily 28 g 3    vitamin B-12 (CYANOCOBALAMIN) 1000 MCG tablet Take 1 tablet by mouth once a week 30 tablet 3    aspirin 81 MG EC tablet Take 1 tablet by mouth 2 times daily 30 tablet 3    rOPINIRole (REQUIP) 4 MG tablet Take by mouth 3 times daily       glipiZIDE (GLUCOTROL) 5 MG tablet 5mg in the am and 2.5 mg in the pm      Continuous Blood Gluc Sensor (FREESTYLE DAVIS 14 DAY SENSOR) MISC       Multiple Vitamins-Minerals (THERAPEUTIC MULTIVITAMIN-MINERALS) tablet Take 1 tablet by mouth daily       Multiple Vitamins-Minerals (PRESERVISION AREDS PO) Take by mouth daily       Continuous Blood Gluc  (FREESTYLE DAVIS 14 DAY READER) MATTHEW       estradiol (ESTRACE VAGINAL) 0.1 MG/GM vaginal cream Place 1 g vaginally Twice a Week (Patient taking differently: Place 1 g vaginally every 14 days ) 1 Tube 5    CRANBERRY PO Take by mouth 2 times daily      predniSONE (DELTASONE) 10 MG tablet TAKE 2 TABLETS BY MOUTH EVERY DAY FOR 5 DAYS (Patient not taking: Reported on 5/26/2022)      primidone (MYSOLINE) 50 MG tablet Take 50 mg by mouth 2 times daily (Patient not taking: Reported on 5/26/2022)      benzonatate (TESSALON) 200 MG capsule Take 1 capsule by mouth 3 times daily as needed for Cough 30 capsule 0    fluconazole (DIFLUCAN) 150 MG tablet Take 1 tablet by mouth daily 7 tablet 0    donepezil (ARICEPT) 10 MG tablet Take 10 mg by mouth nightly        No current facility-administered medications for this visit. Facility-Administered Medications Ordered in Other Visits   Medication Dose Route Frequency Provider Last Rate Last Admin    0.9 % sodium chloride infusion 250 mL  250 mL IntraVENous Once Margie Suazo MD           Allergies   Allergen Reactions    Iodides Anaphylaxis, Hives and Itching     \"Contrast dyes\"  This was added by someone but Patient states has had IVP dyes multiple times without problems and had a myelogram in Dec 2016 without problems    Povidone-Iodine Anaphylaxis, Hives and Swelling    Sulfa Antibiotics Hives and Swelling     Other reaction(s): Unknown  Other reaction(s):  Intolerance-unknown  Hands, feet, mouth, eyes  Other reaction(s): Unknown    Betadine [Povidone Iodine] Hives    Cephalexin Hives and Swelling    Cephalexin Itching     Other reaction(s): Hallucinations  In 1969 received demerol and keflex together so was told to list both as allergies    Cozaar [Losartan] Nausea Only     Pt also gets sores on toungue    Cymbalta [Duloxetine Hcl] Diarrhea and Nausea And Vomiting     Weakness    Demerol Hives and Swelling    Doxycycline Other (See Comments)     Sore mouth        Meperidine Itching     Other reaction(s): Hallucinations      Morphine Hives and Itching    Penicillins Hives, Swelling and Itching     Other reaction(s): Intolerance-unknown  Other reaction(s): Unknown    Zithromax [Azithromycin] Other (See Comments)     Sore mouth      Clindamycin/Lincomycin     Etodolac     Fluorescein     Iodine      Other reaction(s): Intolerance-unknown    Lisinopril      cough    Penicillin G     Soap     Toviaz [Fesoterodine Fumarate Er]     Clonazepam Other (See Comments)     From neuro: made her too sleepy    Metformin And Related Nausea And Vomiting     Diarrhea and N/V         Review of Systems     Vitals:    05/26/22 0823   BP: (!) 165/88   Pulse: 78     weight: 155 lb (70.3 kg)      Review of Systems   Constitutional: Negative. HENT: Negative. Eyes: Negative. Respiratory: Negative. Cardiovascular: Negative. Gastrointestinal: Negative. Endocrine: Negative. Genitourinary: Negative. Musculoskeletal: Positive for back pain and neck pain. Skin: Negative. Allergic/Immunologic: Negative. Neurological: Positive for tremors. Hematological: Negative. Psychiatric/Behavioral: Negative. Neurological Examination  Constitutional .General exam well groomed   Head/Ears /Nose/Throat: external ear . Normal exam  Neck and thyroid . Normal size. No bruits  Respiratory . Breathsounds clear bilaterally  Cardiovascular:  Auscultation of heart with regular rate and rhythm  Musculoskeletal. Muscle bulk and tone normal                                                           Muscle strength 5/5 strength throughout                                                                                No dysmetria or dysdiadokinesis  Postural tremor with both hands outstretched   Normal fine motor  Gait imbalance   Orientation Alert and oriented x 3   Attention and concentration normal  Short term memory 1 word out of 3 minutes   Language process and speech normal . No aphasia   Cranial nerve 2 normal acuety and visual fields  Cranial nerve 3, 4 and 6 . Extraocular muscles are intact . Pupils are equal and reactive   Cranial nerve 5 . Normal strength of masseter and temporalis . Intact corneal reflex. Normal facial sensation  Cranial nerve 7 normal exam   Cranial nerve 8. Grossly intact hearing   Cranial nerve 9 and 10. Symmetric palate elevation   Cranial nerve 11 , 5 out of 5 strength   Cranial Nerve 12 midline tongue . No atrophy  Sensation . Decreased pinprick and light touch mid lower leg bilaterally   Deep Tendon Reflexes hypoactive with absnet ankle   Plantar response flexor bilaterally      ASSESSMENT/PLAN      Diagnosis Orders   1. Essential tremor  CT HEAD WO CONTRAST    T3, Free    T4, Free    TSH   2. Chronic low back pain with sciatica, sciatica laterality unspecified, unspecified back pain laterality     3. Chronic neck pain     4.  Restless legs syndrome     Patient has essential tremor to go on mysoline 50 mg po qhs undergoing Head CT and thyroid functions     Orders Placed This Encounter   Procedures    CT HEAD WO CONTRAST     Standing Status:   Future     Standing Expiration Date:   5/26/2023    T3, Free     Standing Status:   Future     Standing Expiration Date:   5/26/2023    T4, Free     Standing Status:   Future     Standing Expiration Date:   5/26/2023    TSH     Standing Status:   Future     Standing Expiration Date:   5/26/2023

## 2022-06-07 ENCOUNTER — HOSPITAL ENCOUNTER (OUTPATIENT)
Age: 82
Setting detail: SPECIMEN
Discharge: HOME | End: 2022-06-07

## 2022-06-07 LAB
CREAT SERPL-MCNC: 0.77 MG/DL (ref 0.5–0.9)
GFR AFRICAN AMERICAN: >60 ML/MIN
GFR NON-AFRICAN AMERICAN: >60 ML/MIN
GFR SERPL CREATININE-BSD FRML MDRD: NORMAL ML/MIN/{1.73_M2}

## 2022-06-08 ENCOUNTER — OFFICE VISIT (OUTPATIENT)
Dept: INFECTIOUS DISEASES | Age: 82
End: 2022-06-08
Payer: MEDICARE

## 2022-06-08 ENCOUNTER — HOSPITAL ENCOUNTER (OUTPATIENT)
Age: 82
Setting detail: SPECIMEN
Discharge: HOME | End: 2022-06-08

## 2022-06-08 ENCOUNTER — HOSPITAL ENCOUNTER (OUTPATIENT)
Dept: CT IMAGING | Age: 82
Discharge: HOME | End: 2022-06-10
Payer: MEDICARE

## 2022-06-08 VITALS
RESPIRATION RATE: 18 BRPM | BODY MASS INDEX: 31.25 KG/M2 | SYSTOLIC BLOOD PRESSURE: 123 MMHG | WEIGHT: 155 LBS | HEART RATE: 86 BPM | HEIGHT: 59 IN | OXYGEN SATURATION: 97 % | TEMPERATURE: 97 F | DIASTOLIC BLOOD PRESSURE: 82 MMHG

## 2022-06-08 DIAGNOSIS — G25.0 ESSENTIAL TREMOR: ICD-10-CM

## 2022-06-08 DIAGNOSIS — N39.0 URINARY TRACT INFECTION WITHOUT HEMATURIA, SITE UNSPECIFIED: Primary | ICD-10-CM

## 2022-06-08 DIAGNOSIS — N39.0 URINARY TRACT INFECTION WITHOUT HEMATURIA, SITE UNSPECIFIED: ICD-10-CM

## 2022-06-08 LAB
-: ABNORMAL
BILIRUBIN URINE: NEGATIVE
COLOR: YELLOW
EPITHELIAL CELLS UA: ABNORMAL /HPF (ref 0–5)
GLUCOSE URINE: ABNORMAL
KETONES, URINE: NEGATIVE
LEUKOCYTE ESTERASE, URINE: ABNORMAL
MUCUS: ABNORMAL
NITRITE, URINE: NEGATIVE
PH UA: 5 (ref 5–8)
PROTEIN UA: ABNORMAL
RBC UA: ABNORMAL /HPF (ref 0–2)
SPECIFIC GRAVITY UA: 1.02 (ref 1–1.03)
TURBIDITY: ABNORMAL
URINE HGB: NEGATIVE
UROBILINOGEN, URINE: NORMAL
WBC UA: ABNORMAL /HPF (ref 0–5)

## 2022-06-08 PROCEDURE — 1123F ACP DISCUSS/DSCN MKR DOCD: CPT | Performed by: INTERNAL MEDICINE

## 2022-06-08 PROCEDURE — 1036F TOBACCO NON-USER: CPT | Performed by: INTERNAL MEDICINE

## 2022-06-08 PROCEDURE — 99213 OFFICE O/P EST LOW 20 MIN: CPT | Performed by: INTERNAL MEDICINE

## 2022-06-08 PROCEDURE — 70450 CT HEAD/BRAIN W/O DYE: CPT

## 2022-06-08 PROCEDURE — 1090F PRES/ABSN URINE INCON ASSESS: CPT | Performed by: INTERNAL MEDICINE

## 2022-06-08 PROCEDURE — G8427 DOCREV CUR MEDS BY ELIG CLIN: HCPCS | Performed by: INTERNAL MEDICINE

## 2022-06-08 PROCEDURE — G8400 PT W/DXA NO RESULTS DOC: HCPCS | Performed by: INTERNAL MEDICINE

## 2022-06-08 PROCEDURE — G8417 CALC BMI ABV UP PARAM F/U: HCPCS | Performed by: INTERNAL MEDICINE

## 2022-06-08 RX ORDER — FLUCONAZOLE 100 MG/1
200 TABLET ORAL DAILY
Qty: 14 TABLET | Refills: 0 | Status: SHIPPED | OUTPATIENT
Start: 2022-06-08 | End: 2022-06-15

## 2022-06-08 NOTE — PROGRESS NOTES
Infectious disease Consult Note      Patient: Paulette Campbell  : 1940  Acct#:  [de-identified]     Date:  2022    Subjective:       History of Present Illness  Patient is a [de-identified] y.o.  female   Chief Complaint   Patient presents with    Follow-up     sick call uti    The patient was referred by Dr. Ina Fregoso for recurrent UTIs, several infections in the last year, most recent was on 10/18/2021 urine culture grew yeast was treated with Diflucan. The patient had urinary retention does straight cath for years. She is on Myrbetriq      She had multiple antibiotics allergy include sulfa, cephalexin, penicillin, clindamycin and Zithromax. Previous urine culture on 2021, 2021 and 2021 grew yeast, not Candida albicans or Candida DUBLINIENSIS  Urine culture on 3 /29/21 and 2021 grew E. Coli  History of chronic ITP, CVA, hypertension, hyperlipidemia, diabetes mellitus, diabetic gastroparesis. Interstim sacral neuromodulation device  please placed 12/10/2013 removed 21 status post Botox 300 IU 2021. She had cystoscopy done  She is up-to-date on Covid 19 vaccine and flu  Interval history 2021  She is complaining of frequency with urination and dysuria  She is finishing a course of Diflucan and Levaquin. Urine culture on 2021 grew   YEAST, NOT CANDIDA ALBICANS OR CANDIDA DUBLINIENSIS >145149 CFU/ML Abnormal      Interval history 2022    The patient was treated with high-dose Diflucan 400 daily for 7 days last month. She is feeling well today, denied urinary symptoms, no frequency or burning, still use straight cath, denied abdominal pain, no fever or chills, no other complaints. She is on estradiol cream, d-mannose and cranberry pills. Interval history 2022  The patient is complaining of urgency, dysuria, burning with urination and vaginal itching started around 10 days ago after she was started on oral Cipro for left leg cellulitis.   She has been doing Straight cath  She denied fever or chills, no nausea or vomiting, no diarrhea, no other complaints. Past Medical History:   Diagnosis Date    Age-related cognitive decline     Ankle pain     Left ankle fracture in ortho boat.    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    left with urinary incontinenc post surgical    Caffeine use     2 coffee/day, 1-2 tea per week    Chronic back pain     Chronic ITP (idiopathic thrombocytopenia) (HCC)     CVA (cerebral infarction) 2008, 2010    balance issues, short term memory loss    Diabetic gastroparesis ([address]     Diverticular disease 1986    GERD (gastroesophageal reflux disease)     Hiatal hernia     Hip fracture ([address]     History of blood transfusion     History of decreased platelet count     Hyperlipemia     Hypertension     Internal hemorrhoid     Macular degeneration of left eye 1980's    S/P laser treatment    Nausea and vomiting     Neuropathy     Osteoarthritis     Ringing in ears     Self-catheterizes urinary bladder     6-7 times daily    TIA (transient ischemic attack) 2000    x1    Tubular adenoma     colon polyps    Type II or unspecified type diabetes mellitus without mention of complication, not stated as uncontrolled     Urinary incontinence     frequent UTI s/p infection, surgical dilatation lead to incontinence    Wears dentures     full on top, partial on bottom    Wears glasses       Past Surgical History:   Procedure Laterality Date    APPENDECTOMY  1962    BLADDER SURGERY      bladder stimulator    BLADDER SUSPENSION  2002    multiple    CARDIAC CATHETERIZATION  2008    no stents    CARDIOVASCULAR STRESS TEST  12/2014    CATARACT REMOVAL WITH IMPLANT Left 11/07/2017    Raffoalhaji/Sulaiman    CATARACT REMOVAL WITH IMPLANT Right 11/28/2017    Raffoalhaji/Sulaiman    COLON SURGERY      colostomy reversal    COLONOSCOPY  04/07/2016    tubular adenoma x3; internal hemorrhoids    COLONOSCOPY N/A 11/06/2019    COLONOSCOPY DIAGNOSTIC performed by Michael Padilla MD at [address] [facility]  11/06/2019    COLOSTOMY  1986    bowel obstruction/ rupture from diverticular disease, reversal 3 mos later    CYSTOSCOPY  09/08/2017    W/ 200IU Botox     FRACTURE SURGERY Right 2011    hip    FRACTURE SURGERY Left 2001    WRIST    FRACTURE SURGERY Left 2013    ankle    HERNIA REPAIR      HYSTERECTOMY (CERVIX STATUS UNKNOWN)  1969    JOINT REPLACEMENT Bilateral 2000    BILAT KNEES    LUMBAR FUSION  2017    L2/L3    DE XCAPSL CTRC RMVL INSJ IO LENS PROSTH W/O ECP Left 11/07/2017    EYE CATARACT EMULSIFICATION IOL IMPLANT performed by Shabana Nguyen MD at [facility] W/O ECP Right 11/28/2017    EYE CATARACT EMULSIFICATION IOL IMPLANT performed by Shabana Nguyen MD at [facility] Right 10/27/2015    WITH POLY EXCHANGE BIOMET AND GPS APPLICATION    REVISION TOTAL KNEE ARTHROPLASTY Left 07/14/2020    KNEE TOTAL ARTHROPLASTY REVISION - POLY EXCHANGE performed by La Samano MD at [address]  2010    fusion, did not take   [address]    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    NEAL AND BSO (CERVIX REMOVED)      TONSILLECTOMY  1978    UPPER GASTROINTESTINAL ENDOSCOPY      UPPER GASTROINTESTINAL ENDOSCOPY  05/05/2014    UPPER GASTROINTESTINAL ENDOSCOPY  04/07/2016    mild gastritis    UPPER GASTROINTESTINAL ENDOSCOPY N/A 07/17/2018    retained thick secretions in proximal esophagus          Admission Meds  Current Outpatient Medications on File Prior to Visit   Medication Sig Dispense Refill    primidone (MYSOLINE) 50 MG tablet Take 1 po qhs 30 tablet 3    LIVALO 2 MG TABS tablet TAKE 1 TABLET BY MOUTH IN THE EVENING 30 tablet 5    acetaminophen (TYLENOL) 500 MG tablet Take 2 tablets by mouth every 6 hours as needed for Pain 60 tablet 0    irbesartan (AVAPRO) 75 MG tablet TAKE 1 TABLET BY MOUTH EVERY DAY 30 tablet 3    predniSONE (DELTASONE) 10 MG tablet TAKE 2 TABLETS BY MOUTH EVERY DAY FOR 5 DAYS      primidone (MYSOLINE) 50 MG tablet Take 50 mg by mouth 2 times daily       nystatin-triamcinolone (MYCOLOG II) 083679-4.1 UNIT/GM-% cream Apply topically 4 times daily Apply topically 4 times daily. 30 g 3    lubiprostone (AMITIZA) 8 MCG CAPS capsule Take 1 capsule by mouth daily 30 capsule 1    nitrofurantoin, macrocrystal-monohydrate, (MACROBID) 100 MG capsule Take 1 capsule by mouth daily 30 capsule 11    lidocaine (XYLOCAINE) 2 % jelly APPLY TOPICALLY AS NEEDED      Wheat Dextrin (BENEFIBER DRINK MIX PO) Take by mouth 2 tablespoons every night       albuterol (PROVENTIL) (5 MG/ML) 0.5% nebulizer solution Inhale 0.5 mLs into the lungs as needed       nystatin (MYCOSTATIN) 958762 UNIT/GM powder Apply 3 times daily.  45 g 3    esomeprazole (NEXIUM) 40 MG delayed release capsule Take 1 capsule by mouth every morning (before breakfast) 90 capsule 3    hydrocortisone (ANUSOL-HC) 2.5 % CREA rectal cream Place rectally 2 times daily 28 g 3    vitamin B-12 (CYANOCOBALAMIN) 1000 MCG tablet Take 1 tablet by mouth once a week 30 tablet 3    aspirin 81 MG EC tablet Take 1 tablet by mouth 2 times daily 30 tablet 3    rOPINIRole (REQUIP) 4 MG tablet Take by mouth 3 times daily       glipiZIDE (GLUCOTROL) 5 MG tablet 5mg in the am and 2.5 mg in the pm      Continuous Blood Gluc Sensor (FREESTYLE DAVIS 14 DAY SENSOR) MISC       Multiple Vitamins-Minerals (THERAPEUTIC MULTIVITAMIN-MINERALS) tablet Take 1 tablet by mouth daily       Multiple Vitamins-Minerals (PRESERVISION AREDS PO) Take by mouth daily       Continuous Blood Gluc  (FREESTYLE DAVIS 14 DAY READER) MATTHEW       estradiol (ESTRACE VAGINAL) 0.1 MG/GM vaginal cream Place 1 g vaginally Twice a Week (Patient taking differently: Place 1 g vaginally every 14 days ) 1 Tube 5    CRANBERRY PO Take by mouth 2 times daily      benzonatate (TESSALON) 200 MG capsule Take 1 capsule by mouth 3 times daily as needed for Cough 30 capsule 0    fluconazole (DIFLUCAN) 150 MG tablet Take 1 tablet by mouth daily 7 tablet 0    D-Mannose 350 MG CAPS Take 300 mg by mouth daily 30 capsule 3    donepezil (ARICEPT) 10 MG tablet Take 10 mg by mouth nightly        Current Facility-Administered Medications on File Prior to Visit   Medication Dose Route Frequency Provider Last Rate Last Admin    0.9 % sodium chloride infusion 250 mL  250 mL IntraVENous Once Sarah Simpson MD               Allergies  Allergies   Allergen Reactions    Iodides Anaphylaxis, Hives and Itching     \"Contrast dyes\"  This was added by someone but Patient states has had IVP dyes multiple times without problems and had a myelogram in Dec 2016 without problems    Povidone-Iodine Anaphylaxis, Hives and Swelling    Sulfa Antibiotics Hives and Swelling     Other reaction(s): Unknown  Other reaction(s): Intolerance-unknown  Hands, feet, mouth, eyes  Other reaction(s): Unknown    Betadine [Povidone Iodine] Hives    Cephalexin Hives and Swelling    Cephalexin Itching     Other reaction(s): Hallucinations  In 1969 received demerol and keflex together so was told to list both as allergies    Cozaar [Losartan] Nausea Only     Pt also gets sores on toungue    Cymbalta [Duloxetine Hcl] Diarrhea and Nausea And Vomiting     Weakness    Demerol Hives and Swelling    Doxycycline Other (See Comments)     Sore mouth        Meperidine Itching     Other reaction(s): Hallucinations      Morphine Hives and Itching    Penicillins Hives, Swelling and Itching     Other reaction(s): Intolerance-unknown  Other reaction(s): Unknown    Zithromax [Azithromycin] Other (See Comments)     Sore mouth      Clindamycin/Lincomycin     Etodolac     Fluorescein     Iodine      Other reaction(s):  Intolerance-unknown    Lisinopril      cough    Penicillin G     Soap     Toviaz [Fesoterodine Fumarate Er]     Clonazepam Other (See Comments)     From neuro: made her too sleepy    Metformin And Related Nausea And Vomiting     Diarrhea and N/V        Social   Social History     Tobacco Use    Smoking status: Former Smoker     Packs/day: 0.50     Years: 20.00     Pack years: 10.00     Quit date: 1982     Years since quittin.0    Smokeless tobacco: Former User     Quit date: 1982   Substance Use Topics    Alcohol use: No             Family History   Problem Relation Age of Onset    Breast Cancer Mother     Cancer Mother         breast and uterine  39    Heart Disease Father     Heart Attack Father          28   [address] [facility] Maternal Grandmother     Cancer Brother 46        bronchogenic adenocarcinoma cancer    Lung Cancer Brother     Cancer Sister         lung    Lung Cancer Sister          72    Breast Cancer Sister          62    Cancer Sister         breast          Review of Systems    Other than above 12 systems reviewed were negative . Physical Exam  /82   Pulse 86   Temp 97 °F (36.1 °C)   Resp 18   Ht 4' 11\" (1.499 m)   Wt 155 lb (70.3 kg)   SpO2 97%   BMI 31.31 kg/m²           General Appearance: alert and oriented to person, place and time, well-developed and well-nourished, in no acute distress  Skin: warm and dry, no rash or erythema  Head: normocephalic and atraumatic  Eyes: pupils equal, round, and reactive to light, extraocular eye movements intact, conjunctivae normal  ENT: hearing grossly normal bilaterally. Neck: neck supple and non tender . Pulmonary/Chest: clear to auscultation bilaterally- no wheezes, rales or rhonchi, normal air movement, no respiratory distress  Cardiovascular: normal rate, regular rhythm, normal S1 and S2, no murmurs.   Abdomen: soft, non-tender, non-distended, normal bowel sounds, no masses or organomegaly  Extremities: no cyanosis, clubbing or edema  Musculoskeletal: normal range of motion, no joint swelling, deformity or tenderness  Neurologic: no cranial nerve deficit and muscle strength normal    Data Review:    WBC   Date Value Ref Range Status   03/20/2022 4.5 3.5 - 11.0 k/uL Final   02/02/2022 5.0 3.5 - 11.0 k/uL Final   07/28/2021 6.7 3.5 - 11.3 k/uL Final     Hemoglobin   Date Value Ref Range Status   03/20/2022 12.3 12.0 - 16.0 g/dL Final   02/02/2022 12.7 12.0 - 16.0 g/dL Final   07/28/2021 12.9 11.9 - 15.1 g/dL Final     Hematocrit   Date Value Ref Range Status   03/20/2022 37.4 36 - 46 % Final   02/02/2022 38.9 36 - 46 % Final   07/28/2021 41.9 36.3 - 47.1 % Final     MCV   Date Value Ref Range Status   03/20/2022 88.7 80 - 100 fL Final   02/02/2022 90.7 80 - 100 fL Final   07/28/2021 96.8 82.6 - 102.9 fL Final     Platelets   Date Value Ref Range Status   03/20/2022 134 (L) 150 - 450 k/uL Final   02/02/2022 124 (L) 150 - 450 k/uL Final   01/20/2022 120 (L) 150 - 450 k/uL Final     Sodium   Date Value Ref Range Status   03/20/2022 136 135 - 144 mmol/L Final   07/28/2021 140 135 - 144 mmol/L Final   12/10/2020 142 135 - 144 mmol/L Final     Potassium   Date Value Ref Range Status   03/20/2022 3.6 (L) 3.7 - 5.3 mmol/L Final   07/28/2021 4.6 3.7 - 5.3 mmol/L Final   12/10/2020 4.0 3.7 - 5.3 mmol/L Final     Chloride   Date Value Ref Range Status   03/20/2022 101 98 - 107 mmol/L Final   07/28/2021 107 98 - 107 mmol/L Final   12/10/2020 108 (H) 98 - 107 mmol/L Final     CO2   Date Value Ref Range Status   03/20/2022 21 20 - 31 mmol/L Final   07/28/2021 20 20 - 31 mmol/L Final   12/10/2020 17 (L) 20 - 31 mmol/L Final     Phosphorus   Date Value Ref Range Status   08/24/2018 3.5 2.6 - 4.5 mg/dL Final     BUN   Date Value Ref Range Status   03/20/2022 19 8 - 23 mg/dL Final   07/28/2021 22 8 - 23 mg/dL Final   12/10/2020 17 8 - 23 mg/dL Final     CREATININE   Date Value Ref Range Status   06/07/2022 0.77 0.50 - 0.90 mg/dL Final   03/20/2022 0. 72 0.50 - 0.90 mg/dL Final   07/28/2021 0.70 0.50 - 0.90 mg/dL Final     AST   Date Value Ref Range Status   03/20/2022 17 <32 U/L Final   07/28/2021 17 <32 U/L Final   12/10/2020 19 <32 U/L Final     ALT   Date Value Ref Range Status   03/20/2022 16 5 - 33 U/L Final   07/28/2021 18 5 - 33 U/L Final   12/10/2020 18 5 - 33 U/L Final     Total Bilirubin   Date Value Ref Range Status   03/20/2022 0.54 0.3 - 1.2 mg/dL Final   07/28/2021 0.58 0.3 - 1.2 mg/dL Final   12/10/2020 0.62 0.3 - 1.2 mg/dL Final     Alkaline Phosphatase   Date Value Ref Range Status   03/20/2022 77 35 - 104 U/L Final   07/28/2021 70 35 - 104 U/L Final   12/10/2020 73 35 - 104 U/L Final     Lipase   Date Value Ref Range Status   01/06/2017 41 13 - 60 U/L Final     Comment:     [facility] [address] [address] [phone]   03/12/2016 15 13 - 60 U/L Final     Comment:     Performed at [address]   [phone]       Amylase   Date Value Ref Range Status   01/06/2017 47 28 - 100 U/L Final     Comment:     [facility] [address] [address] [phone]   03/12/2016 18 (L) 28 - 100 U/L Final     Comment:     Performed at [address]   [phone]       Protime   Date Value Ref Range Status   03/20/2022 13.1 11.8 - 14.6 sec Final   01/20/2022 13.5 11.8 - 14.6 sec Final   02/21/2017 10.8 9.7 - 12.0 sec Final     INR   Date Value Ref Range Status   03/20/2022 1.0  Final     Comment:           Non-therapeutic Range:     INR = 0.9-1.2  Therapeutic Range: Moderate Anticoagulant Intensity:     INR = 2.0-3.0   High Anticoagulant Intensity:     INR = 2.5-3.5           01/20/2022 1.0  Final     Comment:           Non-therapeutic Range:     INR = 0.9-1.2  Therapeutic Range:    Moderate Anticoagulant Intensity:     INR = 2.0-3.0   High Anticoagulant Intensity:     INR = 2.5-3.5           02/21/2017 1.0  Final Comment:           Non-therapeutic Range:     INR = 0.9-1.2  Therapeutic Range: Moderate Anticoagulant Intensity:     INR = 2.0-3.0   High Anticoagulant Intensity:     INR = 2.5-3.5        Performed at [facility]: CAROPAMELA JAROD MERINO [address] [place] [address]   [phone]       No results found for: PTT  No results found for: OCCULTBLD  No results found for: GLUMET     Imaging Studies:                           All appropriate imaging studies and reports reviewed: Yes  No results found. Assessment:     Recurrent UTIs   History of chronic ITP, CVA, hypertension, hyperlipidemia, diabetes mellitus, diabetic gastroparesis. Recommendations:   UA/ reflex to culture  P.o. Diflucan 200mg daily for 1 week  Follow urine culture and adjust antibiotics as needed  Follow-up in 1 week. Orders Placed This Encounter   Procedures    Urinalysis with Reflex to Culture     Standing Status:   Future     Standing Expiration Date:   6/8/2023     Order Specific Question:   SPECIFY(EX-CATH,MIDSTREAM,CYSTO,ETC)? Answer:   janet trivedi         Thank you for allowing me to participate in the care of your patient. Please feel free to contact me with any questions or concerns.      Katy Padgett MD

## 2022-06-09 LAB
CULTURE: NORMAL
SPECIMEN DESCRIPTION: NORMAL

## 2022-06-21 ENCOUNTER — TELEPHONE (OUTPATIENT)
Dept: NEUROLOGY | Age: 82
End: 2022-06-21

## 2022-06-21 DIAGNOSIS — I63.89 OTHER CEREBRAL INFARCTION (HCC): ICD-10-CM

## 2022-06-21 DIAGNOSIS — I63.9 CEREBRAL INFARCTION, UNSPECIFIED MECHANISM (HCC): Primary | ICD-10-CM

## 2022-06-21 NOTE — TELEPHONE ENCOUNTER
I called Amado Lilly. She wants to discuss with her cardiologist Dr. Vicky Rosado. She just had a CT Scan of her heart and will have a follow up in a week or so.

## 2022-06-21 NOTE — TELEPHONE ENCOUNTER
----- Message from Aubrie Horn MD sent at 6/20/2022  7:58 PM EDT -----  Message to nurse. Let pt know Head CT with old right basal ganglia stroke . Have her continue aspirin and please order carotid US .  Dx cerebral infarction

## 2022-06-22 ENCOUNTER — OFFICE VISIT (OUTPATIENT)
Dept: INFECTIOUS DISEASES | Age: 82
End: 2022-06-22
Payer: MEDICARE

## 2022-06-22 VITALS
BODY MASS INDEX: 32.54 KG/M2 | SYSTOLIC BLOOD PRESSURE: 91 MMHG | HEIGHT: 58 IN | TEMPERATURE: 97 F | RESPIRATION RATE: 17 BRPM | OXYGEN SATURATION: 98 % | WEIGHT: 155 LBS | HEART RATE: 84 BPM | DIASTOLIC BLOOD PRESSURE: 65 MMHG

## 2022-06-22 DIAGNOSIS — R10.9 RIGHT FLANK PAIN: Primary | ICD-10-CM

## 2022-06-22 PROCEDURE — 1036F TOBACCO NON-USER: CPT | Performed by: INTERNAL MEDICINE

## 2022-06-22 PROCEDURE — 1090F PRES/ABSN URINE INCON ASSESS: CPT | Performed by: INTERNAL MEDICINE

## 2022-06-22 PROCEDURE — 99213 OFFICE O/P EST LOW 20 MIN: CPT | Performed by: INTERNAL MEDICINE

## 2022-06-22 PROCEDURE — G8427 DOCREV CUR MEDS BY ELIG CLIN: HCPCS | Performed by: INTERNAL MEDICINE

## 2022-06-22 PROCEDURE — G8400 PT W/DXA NO RESULTS DOC: HCPCS | Performed by: INTERNAL MEDICINE

## 2022-06-22 PROCEDURE — 1123F ACP DISCUSS/DSCN MKR DOCD: CPT | Performed by: INTERNAL MEDICINE

## 2022-06-22 PROCEDURE — G8417 CALC BMI ABV UP PARAM F/U: HCPCS | Performed by: INTERNAL MEDICINE

## 2022-06-22 NOTE — PROGRESS NOTES
Infectious disease Consult Note      Patient: Earnestine Lance  : 1940  Acct#:  [de-identified]     Date:  2022    Subjective:       History of Present Illness  Patient is a [de-identified] y.o.  female   Chief Complaint   Patient presents with    Urinary Tract Infection     1 week f/u   The patient was referred by Dr. Martha Polk for recurrent UTIs, several infections in the last year, most recent was on 10/18/2021 urine culture grew yeast was treated with Diflucan. The patient had urinary retention does straight cath for years. She is on Myrbetriq      She had multiple antibiotics allergy include sulfa, cephalexin, penicillin, clindamycin and Zithromax. Previous urine culture on 2021, 2021 and 2021 grew yeast, not Candida albicans or Candida DUBLINIENSIS  Urine culture on 3 /29/21 and 2021 grew E. Coli  History of chronic ITP, CVA, hypertension, hyperlipidemia, diabetes mellitus, diabetic gastroparesis. Interstim sacral neuromodulation device  please placed 12/10/2013 removed 21 status post Botox 300 IU 2021. She had cystoscopy done  She is up-to-date on Covid 19 vaccine and flu  Interval history 2021  She is complaining of frequency with urination and dysuria  She is finishing a course of Diflucan and Levaquin. Urine culture on 2021 grew   YEAST, NOT CANDIDA ALBICANS OR CANDIDA DUBLINIENSIS >026744 CFU/ML Abnormal      Interval history 2022    The patient was treated with high-dose Diflucan 400 daily for 7 days last month. She is feeling well today, denied urinary symptoms, no frequency or burning, still use straight cath, denied abdominal pain, no fever or chills, no other complaints. She is on estradiol cream, d-mannose and cranberry pills.     Interval history 2022  The patient is complaining of urgency, dysuria, burning with urination and vaginal itching started around 10 days ago after she was started on oral Cipro for left leg cellulitis. She has been doing Straight cath    Interval history 6/22/2022  The patient is feeling better, denied urinary symptoms, no vaginal itching or discharge,, denied fever or chills. She is complaining of intermittent right flank pain. 6/8/2022 urine culture was negative  She completed a course of Diflucan  Past Medical History:   Diagnosis Date    Age-related cognitive decline     Ankle pain     Left ankle fracture in ortho boat.    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    left with urinary incontinenc post surgical    Caffeine use     2 coffee/day, 1-2 tea per week    Chronic back pain     Chronic ITP (idiopathic thrombocytopenia) (HCC)     CVA (cerebral infarction) 2008, 2010    balance issues, short term memory loss    Diabetic gastroparesis ([address]     Diverticular disease 1986    GERD (gastroesophageal reflux disease)     Hiatal hernia     Hip fracture ([address]     History of blood transfusion     History of decreased platelet count     Hyperlipemia     Hypertension     Internal hemorrhoid     Macular degeneration of left eye 1980's    S/P laser treatment    Nausea and vomiting     Neuropathy     Osteoarthritis     Ringing in ears     Self-catheterizes urinary bladder     6-7 times daily    TIA (transient ischemic attack) 2000    x1    Tubular adenoma     colon polyps    Type II or unspecified type diabetes mellitus without mention of complication, not stated as uncontrolled     Urinary incontinence     frequent UTI s/p infection, surgical dilatation lead to incontinence    Wears dentures     full on top, partial on bottom    Wears glasses       Past Surgical History:   Procedure Laterality Date    APPENDECTOMY  1962    BLADDER SURGERY      bladder stimulator    BLADDER SUSPENSION  2002    multiple    CARDIAC CATHETERIZATION  2008    no stents    CARDIOVASCULAR STRESS TEST  12/2014    CATARACT REMOVAL WITH IMPLANT Left 11/07/2017    [facility]    CATARACT REMOVAL WITH IMPLANT Right 11/28/2017    [facility]    COLON SURGERY      colostomy reversal    COLONOSCOPY  04/07/2016    tubular adenoma x3; internal hemorrhoids    COLONOSCOPY N/A 11/06/2019    COLONOSCOPY DIAGNOSTIC performed by Jamshid Fong MD at [address] [facility]  11/06/2019    COLOSTOMY  1986    bowel obstruction/ rupture from diverticular disease, reversal 3 mos later    CYSTOSCOPY  09/08/2017    W/ 200IU Botox     FRACTURE SURGERY Right 2011    hip    FRACTURE SURGERY Left 2001    WRIST    FRACTURE SURGERY Left 2013    ankle    HERNIA REPAIR      HYSTERECTOMY (CERVIX STATUS UNKNOWN)  1969    JOINT REPLACEMENT Bilateral 2000    BILAT KNEES    LUMBAR FUSION  2017    L2/L3    AL XCAPSL CTRC RMVL INSJ IO LENS PROSTH W/O ECP Left 11/07/2017    EYE CATARACT EMULSIFICATION IOL IMPLANT performed by Jovanni Drummond MD at [facility] W/O ECP Right 11/28/2017    EYE CATARACT EMULSIFICATION IOL IMPLANT performed by Jovanni Drummond MD at [facility] Right 10/27/2015    WITH POLY EXCHANGE BIOMET AND GPS APPLICATION    REVISION TOTAL KNEE ARTHROPLASTY Left 07/14/2020    KNEE TOTAL ARTHROPLASTY REVISION - POLY EXCHANGE performed by Allie Seals MD at [address]  2010    fusion, did not take   [address]    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    NEAL AND BSO (CERVIX REMOVED)      TONSILLECTOMY  1978    UPPER GASTROINTESTINAL ENDOSCOPY      UPPER GASTROINTESTINAL ENDOSCOPY  05/05/2014    UPPER GASTROINTESTINAL ENDOSCOPY  04/07/2016    mild gastritis    UPPER GASTROINTESTINAL ENDOSCOPY N/A 07/17/2018    retained thick secretions in proximal esophagus          Admission Meds  Current Outpatient Medications on File Prior to Visit   Medication Sig Dispense Refill    primidone (MYSOLINE) 50 MG tablet Take 1 po qhs 30 tablet 3    LIVALO 2 MG TABS tablet TAKE 1 TABLET BY MOUTH IN THE EVENING 30 tablet 5    acetaminophen (TYLENOL) 500 MG tablet Take 2 tablets by mouth every 6 hours as needed for Pain 60 tablet 0    irbesartan (AVAPRO) 75 MG tablet TAKE 1 TABLET BY MOUTH EVERY DAY 30 tablet 3    predniSONE (DELTASONE) 10 MG tablet TAKE 2 TABLETS BY MOUTH EVERY DAY FOR 5 DAYS      primidone (MYSOLINE) 50 MG tablet Take 50 mg by mouth 2 times daily       nystatin-triamcinolone (MYCOLOG II) 699102-6.1 UNIT/GM-% cream Apply topically 4 times daily Apply topically 4 times daily. 30 g 3    lubiprostone (AMITIZA) 8 MCG CAPS capsule Take 1 capsule by mouth daily 30 capsule 1    nitrofurantoin, macrocrystal-monohydrate, (MACROBID) 100 MG capsule Take 1 capsule by mouth daily 30 capsule 11    lidocaine (XYLOCAINE) 2 % jelly APPLY TOPICALLY AS NEEDED      Wheat Dextrin (BENEFIBER DRINK MIX PO) Take by mouth 2 tablespoons every night       albuterol (PROVENTIL) (5 MG/ML) 0.5% nebulizer solution Inhale 0.5 mLs into the lungs as needed       nystatin (MYCOSTATIN) 480409 UNIT/GM powder Apply 3 times daily.  45 g 3    esomeprazole (NEXIUM) 40 MG delayed release capsule Take 1 capsule by mouth every morning (before breakfast) 90 capsule 3    hydrocortisone (ANUSOL-HC) 2.5 % CREA rectal cream Place rectally 2 times daily 28 g 3    vitamin B-12 (CYANOCOBALAMIN) 1000 MCG tablet Take 1 tablet by mouth once a week 30 tablet 3    aspirin 81 MG EC tablet Take 1 tablet by mouth 2 times daily 30 tablet 3    rOPINIRole (REQUIP) 4 MG tablet Take by mouth 3 times daily       glipiZIDE (GLUCOTROL) 5 MG tablet 5mg in the am and 2.5 mg in the pm      Continuous Blood Gluc Sensor (FREESTYLE DAVIS 14 DAY SENSOR) MISC       Multiple Vitamins-Minerals (THERAPEUTIC MULTIVITAMIN-MINERALS) tablet Take 1 tablet by mouth daily       Multiple Vitamins-Minerals (PRESERVISION AREDS PO) Take by mouth daily       Continuous Blood Gluc  (FREESTYLE DAVIS 14 DAY READER) MATTHEW       estradiol (ESTRACE VAGINAL) 0.1 MG/GM vaginal cream Place 1 g vaginally Twice a Week (Patient taking differently: Place 1 g vaginally every 14 days ) 1 Tube 5    CRANBERRY PO Take by mouth 2 times daily      benzonatate (TESSALON) 200 MG capsule Take 1 capsule by mouth 3 times daily as needed for Cough 30 capsule 0    fluconazole (DIFLUCAN) 150 MG tablet Take 1 tablet by mouth daily 7 tablet 0    D-Mannose 350 MG CAPS Take 300 mg by mouth daily 30 capsule 3    donepezil (ARICEPT) 10 MG tablet Take 10 mg by mouth nightly        Current Facility-Administered Medications on File Prior to Visit   Medication Dose Route Frequency Provider Last Rate Last Admin    0.9 % sodium chloride infusion 250 mL  250 mL IntraVENous Once Harjinder Jo MD               Allergies  Allergies   Allergen Reactions    Iodides Anaphylaxis, Hives and Itching     \"Contrast dyes\"  This was added by someone but Patient states has had IVP dyes multiple times without problems and had a myelogram in Dec 2016 without problems    Povidone-Iodine Anaphylaxis, Hives and Swelling    Sulfa Antibiotics Hives and Swelling     Other reaction(s): Unknown  Other reaction(s): Intolerance-unknown  Hands, feet, mouth, eyes  Other reaction(s): Unknown    Betadine [Povidone Iodine] Hives    Cephalexin Hives and Swelling    Cephalexin Itching     Other reaction(s): Hallucinations  In 1969 received demerol and keflex together so was told to list both as allergies    Cozaar [Losartan] Nausea Only     Pt also gets sores on toungue    Cymbalta [Duloxetine Hcl] Diarrhea and Nausea And Vomiting     Weakness    Demerol Hives and Swelling    Doxycycline Other (See Comments)     Sore mouth        Meperidine Itching     Other reaction(s): Hallucinations      Morphine Hives and Itching    Penicillins Hives, Swelling and Itching     Other reaction(s):  Intolerance-unknown  Other reaction(s): Unknown  Zithromax [Azithromycin] Other (See Comments)     Sore mouth      Clindamycin/Lincomycin     Etodolac     Fluorescein     Iodine      Other reaction(s): Intolerance-unknown    Lisinopril      cough    Penicillin G     Soap     Toviaz [Fesoterodine Fumarate Er]     Clonazepam Other (See Comments)     From neuro: made her too sleepy    Metformin And Related Nausea And Vomiting     Diarrhea and N/V        Social   Social History     Tobacco Use    Smoking status: Former Smoker     Packs/day: 0.50     Years: 20.00     Pack years: 10.00     Quit date: 1982     Years since quittin.0    Smokeless tobacco: Former User     Quit date: 1982   Substance Use Topics    Alcohol use: No             Family History   Problem Relation Age of Onset    Breast Cancer Mother     Cancer Mother         breast and uterine  39    Heart Disease Father     Heart Attack Father          28   [address] [facility] Maternal Grandmother     Cancer Brother 46        bronchogenic adenocarcinoma cancer    Lung Cancer Brother     Cancer Sister         lung    Lung Cancer Sister          72    Breast Cancer Sister          62    Cancer Sister         breast          Review of Systems    Other than above 12 systems reviewed were negative . Physical Exam  BP 91/65   Pulse 84   Temp 97 °F (36.1 °C)   Resp 17   Ht 4' 10\" (1.473 m)   Wt 155 lb (70.3 kg)   SpO2 98%   BMI 32.40 kg/m²           General Appearance: alert and oriented to person, place and time, well-developed and well-nourished, in no acute distress  Skin: warm and dry, no rash or erythema  Head: normocephalic and atraumatic  Eyes: pupils equal, round, and reactive to light, extraocular eye movements intact, conjunctivae normal  ENT: hearing grossly normal bilaterally. Neck: neck supple and non tender .   Pulmonary/Chest: clear to auscultation bilaterally- no wheezes, rales or rhonchi, normal air movement, no respiratory distress  Cardiovascular: normal rate, regular rhythm, normal S1 and S2, no murmurs.   Abdomen: soft, non-tender, non-distended, normal bowel sounds, no masses or organomegaly  Extremities: no cyanosis, clubbing or edema  Musculoskeletal: normal range of motion, no joint swelling, deformity or tenderness  Neurologic: no cranial nerve deficit and muscle strength normal    Data Review:    WBC   Date Value Ref Range Status   03/20/2022 4.5 3.5 - 11.0 k/uL Final   02/02/2022 5.0 3.5 - 11.0 k/uL Final   07/28/2021 6.7 3.5 - 11.3 k/uL Final     Hemoglobin   Date Value Ref Range Status   03/20/2022 12.3 12.0 - 16.0 g/dL Final   02/02/2022 12.7 12.0 - 16.0 g/dL Final   07/28/2021 12.9 11.9 - 15.1 g/dL Final     Hematocrit   Date Value Ref Range Status   03/20/2022 37.4 36 - 46 % Final   02/02/2022 38.9 36 - 46 % Final   07/28/2021 41.9 36.3 - 47.1 % Final     MCV   Date Value Ref Range Status   03/20/2022 88.7 80 - 100 fL Final   02/02/2022 90.7 80 - 100 fL Final   07/28/2021 96.8 82.6 - 102.9 fL Final     Platelets   Date Value Ref Range Status   03/20/2022 134 (L) 150 - 450 k/uL Final   02/02/2022 124 (L) 150 - 450 k/uL Final   01/20/2022 120 (L) 150 - 450 k/uL Final     Sodium   Date Value Ref Range Status   03/20/2022 136 135 - 144 mmol/L Final   07/28/2021 140 135 - 144 mmol/L Final   12/10/2020 142 135 - 144 mmol/L Final     Potassium   Date Value Ref Range Status   03/20/2022 3.6 (L) 3.7 - 5.3 mmol/L Final   07/28/2021 4.6 3.7 - 5.3 mmol/L Final   12/10/2020 4.0 3.7 - 5.3 mmol/L Final     Chloride   Date Value Ref Range Status   03/20/2022 101 98 - 107 mmol/L Final   07/28/2021 107 98 - 107 mmol/L Final   12/10/2020 108 (H) 98 - 107 mmol/L Final     CO2   Date Value Ref Range Status   03/20/2022 21 20 - 31 mmol/L Final   07/28/2021 20 20 - 31 mmol/L Final   12/10/2020 17 (L) 20 - 31 mmol/L Final     Phosphorus   Date Value Ref Range Status   08/24/2018 3.5 2.6 - 4.5 mg/dL Final     BUN   Date Value Ref Range Status   03/20/2022 19 8 - 23 mg/dL Final   07/28/2021 22 8 - 23 mg/dL Final   12/10/2020 17 8 - 23 mg/dL Final     CREATININE   Date Value Ref Range Status   06/07/2022 0.77 0.50 - 0.90 mg/dL Final   03/20/2022 0.72 0.50 - 0.90 mg/dL Final   07/28/2021 0.70 0.50 - 0.90 mg/dL Final     AST   Date Value Ref Range Status   03/20/2022 17 <32 U/L Final   07/28/2021 17 <32 U/L Final   12/10/2020 19 <32 U/L Final     ALT   Date Value Ref Range Status   03/20/2022 16 5 - 33 U/L Final   07/28/2021 18 5 - 33 U/L Final   12/10/2020 18 5 - 33 U/L Final     Total Bilirubin   Date Value Ref Range Status   03/20/2022 0.54 0.3 - 1.2 mg/dL Final   07/28/2021 0.58 0.3 - 1.2 mg/dL Final   12/10/2020 0.62 0.3 - 1.2 mg/dL Final     Alkaline Phosphatase   Date Value Ref Range Status   03/20/2022 77 35 - 104 U/L Final   07/28/2021 70 35 - 104 U/L Final   12/10/2020 73 35 - 104 U/L Final     Lipase   Date Value Ref Range Status   01/06/2017 41 13 - 60 U/L Final     Comment:     [facility] [id number] [place] [address] [phone]   03/12/2016 15 13 - 60 U/L Final     Comment:     Performed at [address]   [phone]       Amylase   Date Value Ref Range Status   01/06/2017 47 28 - 100 U/L Final     Comment:     [facility] [id number] [place] [address], [address] [place] [address] [phone]   03/12/2016 18 (L) 28 - 100 U/L Final     Comment:     Performed at [address]   [phone]       Protime   Date Value Ref Range Status   03/20/2022 13.1 11.8 - 14.6 sec Final   01/20/2022 13.5 11.8 - 14.6 sec Final   02/21/2017 10.8 9.7 - 12.0 sec Final     INR   Date Value Ref Range Status   03/20/2022 1.0  Final     Comment:           Non-therapeutic Range:     INR = 0.9-1.2  Therapeutic Range:    Moderate Anticoagulant Intensity:     INR = 2.0-3.0   High Anticoagulant Intensity:     INR = 2.5-3.5           01/20/2022 1.0  Final     Comment: Non-therapeutic Range:     INR = 0.9-1.2  Therapeutic Range: Moderate Anticoagulant Intensity:     INR = 2.0-3.0   High Anticoagulant Intensity:     INR = 2.5-3.5           02/21/2017 1.0  Final     Comment:           Non-therapeutic Range:     INR = 0.9-1.2  Therapeutic Range: Moderate Anticoagulant Intensity:     INR = 2.0-3.0   High Anticoagulant Intensity:     INR = 2.5-3.5        Performed at [facility]: JUDD MERINO [address] [place] [address]   [phone]       No results found for: PTT  No results found for: OCCULTBLD  No results found for: GLUMET     Imaging Studies:                           All appropriate imaging studies and reports reviewed: Yes  No results found. Assessment:     Recurrent UTIs   History of chronic ITP, CVA, hypertension, hyperlipidemia, diabetes mellitus, diabetic gastroparesis. Recommendations:   Monitor off antibiotics  Renal ultrasound  Follow-up in 8 weeks    Orders Placed This Encounter   Procedures    US RENAL COMPLETE     This procedure can be scheduled via [facility]. Access your [facility] account by visiting [email or web address]. Standing Status:   Future     Standing Expiration Date:   6/22/2023     Order Specific Question:   Reason for exam:     Answer:   Right flank pain         Thank you for allowing me to participate in the care of your patient. Please feel free to contact me with any questions or concerns.      Everett White MD

## 2022-07-07 ENCOUNTER — HOSPITAL ENCOUNTER (OUTPATIENT)
Dept: VASCULAR LAB | Age: 82
Discharge: HOME | End: 2022-07-07
Payer: MEDICARE

## 2022-07-07 ENCOUNTER — HOSPITAL ENCOUNTER (OUTPATIENT)
Dept: ULTRASOUND IMAGING | Age: 82
Discharge: HOME | End: 2022-07-09
Payer: MEDICARE

## 2022-07-07 DIAGNOSIS — R10.9 RIGHT FLANK PAIN: ICD-10-CM

## 2022-07-07 DIAGNOSIS — I63.89 OTHER CEREBRAL INFARCTION (HCC): ICD-10-CM

## 2022-07-07 DIAGNOSIS — I63.9 CEREBRAL INFARCTION, UNSPECIFIED MECHANISM (HCC): ICD-10-CM

## 2022-07-07 PROCEDURE — 93880 EXTRACRANIAL BILAT STUDY: CPT

## 2022-07-07 PROCEDURE — 76770 US EXAM ABDO BACK WALL COMP: CPT

## 2022-08-08 RX ORDER — IRBESARTAN 75 MG/1
TABLET ORAL
Qty: 30 TABLET | Refills: 3 | Status: SHIPPED | OUTPATIENT
Start: 2022-08-08

## 2022-08-25 PROBLEM — R53.82 CHRONIC FATIGUE: Status: ACTIVE | Noted: 2022-04-07

## 2022-08-25 PROBLEM — G62.9 NEUROPATHY: Status: ACTIVE | Noted: 2021-11-02

## 2022-08-25 PROBLEM — Z98.61 CORONARY ANGIOPLASTY STATUS: Chronic | Status: ACTIVE | Noted: 2022-08-25

## 2022-08-25 PROBLEM — G25.0 ESSENTIAL TREMOR: Status: ACTIVE | Noted: 2021-11-02

## 2022-08-25 PROBLEM — I25.10 CORONARY ARTERY DISEASE INVOLVING NATIVE CORONARY ARTERY OF NATIVE HEART WITHOUT ANGINA PECTORIS: Status: ACTIVE | Noted: 2022-07-19

## 2022-08-26 ENCOUNTER — TELEPHONE (OUTPATIENT)
Dept: ONCOLOGY | Age: 82
End: 2022-08-26

## 2022-08-26 ENCOUNTER — OFFICE VISIT (OUTPATIENT)
Dept: ONCOLOGY | Age: 82
End: 2022-08-26
Payer: MEDICARE

## 2022-08-26 VITALS
BODY MASS INDEX: 31.27 KG/M2 | HEART RATE: 67 BPM | TEMPERATURE: 97.1 F | DIASTOLIC BLOOD PRESSURE: 69 MMHG | SYSTOLIC BLOOD PRESSURE: 113 MMHG | WEIGHT: 149.6 LBS

## 2022-08-26 DIAGNOSIS — D69.6 THROMBOCYTOPENIA (HCC): Primary | ICD-10-CM

## 2022-08-26 PROCEDURE — 1036F TOBACCO NON-USER: CPT | Performed by: INTERNAL MEDICINE

## 2022-08-26 PROCEDURE — G8400 PT W/DXA NO RESULTS DOC: HCPCS | Performed by: INTERNAL MEDICINE

## 2022-08-26 PROCEDURE — 99214 OFFICE O/P EST MOD 30 MIN: CPT | Performed by: INTERNAL MEDICINE

## 2022-08-26 PROCEDURE — 1123F ACP DISCUSS/DSCN MKR DOCD: CPT | Performed by: INTERNAL MEDICINE

## 2022-08-26 PROCEDURE — 99211 OFF/OP EST MAY X REQ PHY/QHP: CPT | Performed by: INTERNAL MEDICINE

## 2022-08-26 PROCEDURE — G8417 CALC BMI ABV UP PARAM F/U: HCPCS | Performed by: INTERNAL MEDICINE

## 2022-08-26 PROCEDURE — 1090F PRES/ABSN URINE INCON ASSESS: CPT | Performed by: INTERNAL MEDICINE

## 2022-08-26 PROCEDURE — G8427 DOCREV CUR MEDS BY ELIG CLIN: HCPCS | Performed by: INTERNAL MEDICINE

## 2022-08-26 RX ORDER — CLOPIDOGREL BISULFATE 75 MG/1
75 TABLET ORAL DAILY
COMMUNITY
Start: 2022-08-05

## 2022-08-26 RX ORDER — NITROGLYCERIN 0.4 MG/1
TABLET SUBLINGUAL
COMMUNITY
Start: 2022-04-07

## 2022-08-26 RX ORDER — NITROGLYCERIN 4 MG/G
OINTMENT RECTAL
COMMUNITY
Start: 2022-08-23

## 2022-08-26 RX ORDER — DIPHENHYDRAMINE HCL 50 MG
CAPSULE ORAL
COMMUNITY
Start: 2022-06-14 | End: 2022-09-02

## 2022-08-26 RX ORDER — LIDOCAINE 50 MG/G
OINTMENT TOPICAL
COMMUNITY
Start: 2022-08-23

## 2022-08-26 RX ORDER — AMLODIPINE BESYLATE 2.5 MG/1
2.5 TABLET ORAL DAILY
COMMUNITY
Start: 2022-07-19 | End: 2022-09-02

## 2022-08-26 RX ORDER — METOPROLOL SUCCINATE 25 MG/1
12.5 TABLET, EXTENDED RELEASE ORAL DAILY
COMMUNITY
Start: 2022-08-19

## 2022-08-26 RX ORDER — ISOSORBIDE MONONITRATE 30 MG/1
TABLET, EXTENDED RELEASE ORAL
COMMUNITY
Start: 2022-07-25 | End: 2022-09-20 | Stop reason: ALTCHOICE

## 2022-08-26 NOTE — PROGRESS NOTES
_           Chief Complaint   Patient presents with    Follow-up     Review status of disease    Discuss Labs     DIAGNOSIS:         Chronic thrombocytopenia. 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 CURRENT THERAPY:         Observation for the above. BRIEF CASE HISTORY:      Ms. Taniya Schreiber is a very pleasant 81 y.o. female with history of multiple co morbidities as listed. Patient is referred for evaluation of thrombocytopenia. Patient is aware of this hematologic problem for so many years. Patient states that she had problems with thrombocytopenia before moving [place] in 2007. She never needed any treatment. She was always told to have low platelet counts before surgeries. Never had any complications. Currently stable. No nosebleed or gum bleed. She has chronic easy bruising. No GI bleeding. No hematuria. No vaginal bleeding. No fever or night sweats. No enlarged lymph nodes. No weight loss or decreased appetite. No other complaints. Patient denies smoking or alcohol drinking. .   INTERIM HISTORY:   Seen for follow up thrombocytopenia. Clinically stable. She had recent cath and started on ASA and plavix. No bruises. No active bleeding. No fever.     PAST MEDICAL HISTORY: has a past medical history of Age-related cognitive decline, Ankle pain, Anxiety, B12 deficiency, Winters esophagus, 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HCC), Caffeine use, Chronic back pain, Chronic ITP (idiopathic thrombocytopenia) (HCC), CVA (cerebral infarction), Diabetic gastroparesis ([address] Diverticular disease, GERD (gastroesophageal reflux disease), Hiatal hernia, Hip fracture ([address] History of blood transfusion, History of decreased platelet count, Hyperlipemia, Hypertension, Internal hemorrhoid, Macular degeneration of left eye, Nausea and vomiting, Neuropathy, Osteoarthritis, Ringing in ears, Self-catheterizes urinary bladder, TIA (transient ischemic attack), Tubular adenoma, Type II or unspecified type diabetes mellitus without mention of complication, not stated as uncontrolled, Urinary incontinence, Wears dentures, and Wears glasses. PAST SURGICAL HISTORY: has a past surgical history that includes bladder suspension (2002); Hysterectomy (1969); Tonsillectomy (1978); Appendectomy (1962); Spine surgery (2010); colostomy (1986); Revision Colostomy (1986); Spine surgery (1990); Upper gastrointestinal endoscopy; Bladder surgery; Upper gastrointestinal endoscopy (05/05/2014); cardiovascular stress test (12/2014); Colon surgery; Total abdominal hysterectomy w/ bilateral salpingoophorectomy; fracture surgery (Right, 2011); fracture surgery (Left, 2001); fracture surgery (Left, 2013); Cardiac catheterization (2008); Revision total knee arthroplasty (Right, 10/27/2015); Colonoscopy (04/07/2016); Upper gastrointestinal endoscopy (04/07/2016); lumbar fusion (2017); Cystocopy (09/08/2017); Cataract removal with implant (Left, 11/07/2017); pr xcapsl ctrc rmvl insj io lens prosth w/o ecp (Left, 11/07/2017); Cataract removal with implant (Right, 11/28/2017); pr xcapsl ctrc rmvl insj io lens prosth w/o ecp (Right, 11/28/2017); Upper gastrointestinal endoscopy (N/A, 07/17/2018); joint replacement (Bilateral, 2000); hernia repair; Colonoscopy (N/A, 11/06/2019); Revision total knee arthroplasty (Left, 07/14/2020); Colonoscopy (11/06/2019); and Coronary angioplasty with stent (08/04/2022).      CURRENT MEDICATIONS:  has a current medication list which includes the following prescription(s): clopidogrel, nitroglycerin, rectiv, metoprolol succinate, lidocaine, isosorbide mononitrate, diphenhydramine, amlodipine, irbesartan, primidone, livalo, acetaminophen, prednisone, primidone, nystatin-triamcinolone, lubiprostone, nitrofurantoin (macrocrystal-monohydrate), lidocaine, wheat dextrin, albuterol, nystatin, esomeprazole, hydrocortisone, vitamin b-12, aspirin, ropinirole, glipizide, freestyle paola 14 day sensor, therapeutic multivitamin-minerals, multiple vitamins-minerals, freestyle paola 14 day reader, cranberry, and d-mannose, and the following Facility-Administered Medications: 0.9 % sodium chloride. ALLERGIES:  is allergic to iodides, povidone-iodine, sulfa antibiotics, betadine [povidone iodine], cephalexin, cephalexin, cozaar [losartan], cymbalta [duloxetine hcl], demerol, doxycycline, meperidine, morphine, penicillins, zithromax [azithromycin], clindamycin/lincomycin, etodolac, fluorescein, iodine, lisinopril, penicillin g, soap, toviaz [fesoterodine fumarate er], clonazepam, and metformin and related. FAMILY HISTORY: Multiple family numbers with cancer including mother who had breast cancer at young age. Otherwise negative for any hematological or oncological conditions. SOCIAL HISTORY:  reports that she quit smoking about 40 years ago. Her smoking use included cigarettes. She has a 10.00 pack-year smoking history. She quit smokeless tobacco use about 40 years ago. She reports that she does not drink alcohol and does not use drugs. REVIEW OF SYSTEMS:     General: No weakness or fatigue. No unanticipated weight loss or decreased appetite. No fever or chills. Eyes: No blurred vision, eye pain or double vision. Ears: No hearing problems or drainage. No tinnitus. Throat: No sore throat, problems with swallowing or dysphagia. Respiratory: No cough, sputum or hemoptysis. No shortness of breath. No pleuritic chest pain. Cardiovascular: No chest pain, orthopnea or PND. No lower extremity edema. No palpitation. Gastrointestinal: No problems with swallowing. No abdominal pain or bloating. No nausea or vomiting. No diarrhea or constipation. No GI bleeding. Genitourinary: No dysuria, hematuria, frequency or urgency. Musculoskeletal: No muscle aches or pains. No limitation of movement. No back pain. No gait disturbance, No joint complaints.   Dermatologic: No skin rashes or pruritus. No skin lesions or discolorations. Psychiatric: No depression, anxiety, or stress or signs of schizophrenia. No change in mood or affect. Hematologic: No history of bleeding tendency. No bruises or ecchymosis. No history of clotting problems. Infectious disease: No fever, chills or frequent infections. Endocrine: No polydipsia or polyuria. No temperature intolerance. Neurologic: No headaches or dizziness. No weakness or numbness of the extremities. No changes in balance, coordination,  memory, mentation, behavior. Allergic/Immunologic: No nasal congestion or hives. No repeated infections. PHYSICAL EXAM:  The patient is not in acute distress. Vital signs: Blood pressure 113/69, pulse 67, temperature 97.1 °F (36.2 °C), temperature source Temporal, weight 149 lb 9.6 oz (67.9 kg), not currently breastfeeding. General appearance - well appearing, not in pain or distress  Mental status - good mood, alert and oriented  Eyes - pupils equal and reactive, extraocular eye movements intact  Ears - bilateral TM's and external ear canals normal  Nose - normal and patent, no erythema, discharge or polyps  Mouth - mucous membranes moist, pharynx normal without lesions  Neck - supple, no significant adenopathy  Lymphatics - no palpable lymphadenopathy, no hepatosplenomegaly  Chest - clear to auscultation, no wheezes, rales or rhonchi, symmetric air entry  Heart - normal rate, regular rhythm, normal S1, S2, no murmurs, rubs, clicks or gallops  Abdomen - soft, nontender, nondistended, no masses or organomegaly  Neurological -patient has tremors.   Alert, oriented, normal speech, no focal findings or movement disorder noted  Musculoskeletal - no joint tenderness, deformity or swelling  Extremities - peripheral pulses normal, no pedal edema, no clubbing or cyanosis  Skin - normal coloration and turgor, no rashes, no suspicious skin lesions noted     Review of Diagnostic data:   Lab Results Component Value Date    WBC 4.5 03/20/2022    HGB 12.3 03/20/2022    HCT 37.4 03/20/2022    MCV 88.7 03/20/2022     (L) 03/20/2022       Chemistry        Component Value Date/Time     03/20/2022 1001    K 3.6 (L) 03/20/2022 1001     03/20/2022 1001    CO2 21 03/20/2022 1001    BUN 19 03/20/2022 1001    CREATININE 0.77 06/07/2022 1115        Component Value Date/Time    CALCIUM 9.3 03/20/2022 1001    ALKPHOS 77 03/20/2022 1001    AST 17 03/20/2022 1001    ALT 16 03/20/2022 1001    BILITOT 0.54 03/20/2022 1001            IMPRESSION:   Chronic thrombocytopenia. 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 PLAN: I reviewed the labs as above and discussed with the patient. I explained to the patient the nature of this hematologic problem. I explained the significance of these abnormalities in layman language. Patient is having chronic thrombocytopenia for so many years. More than 15 years. Platelets were above 100 most of the time. Clinically no symptoms related to thrombocytopenia. Never needed treatment. Patient was evaluated by hematology in the past.  She had diagnosis of chronic ITP. At this juncture with platelets being stable above 100 with no symptoms I do not believe there is a need for treatment but rather monitoring and observation. We will continue  observation with CBC every 6 months would be quite reasonable. OK for ASA and plavix. Patient's questions were answered to the best of her satisfaction and she verbalized full understanding and agreement.

## 2022-09-02 ENCOUNTER — HOSPITAL ENCOUNTER (OUTPATIENT)
Dept: GENERAL RADIOLOGY | Age: 82
Discharge: HOME | End: 2022-09-04
Payer: MEDICARE

## 2022-09-02 ENCOUNTER — OFFICE VISIT (OUTPATIENT)
Dept: PRIMARY CARE CLINIC | Age: 82
End: 2022-09-02
Payer: MEDICARE

## 2022-09-02 ENCOUNTER — HOSPITAL ENCOUNTER (OUTPATIENT)
Age: 82
Discharge: HOME | End: 2022-09-04
Payer: MEDICARE

## 2022-09-02 VITALS
DIASTOLIC BLOOD PRESSURE: 68 MMHG | HEIGHT: 58 IN | OXYGEN SATURATION: 99 % | SYSTOLIC BLOOD PRESSURE: 114 MMHG | HEART RATE: 54 BPM | WEIGHT: 148.4 LBS | BODY MASS INDEX: 31.15 KG/M2

## 2022-09-02 DIAGNOSIS — E11.51 TYPE 2 DIABETES MELLITUS WITH DIABETIC PERIPHERAL ANGIOPATHY WITHOUT GANGRENE, WITHOUT LONG-TERM CURRENT USE OF INSULIN (HCC): ICD-10-CM

## 2022-09-02 DIAGNOSIS — Z91.81 AT HIGH RISK FOR FALLS: Primary | ICD-10-CM
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
## 2022-09-02 DIAGNOSIS — R42 VERTIGO: ICD-10-CM

## 2022-09-02 DIAGNOSIS — S00.83XD CONTUSION OF FACE, SUBSEQUENT ENCOUNTER: ICD-10-CM

## 2022-09-02 PROCEDURE — 70150 X-RAY EXAM OF FACIAL BONES: CPT

## 2022-09-02 PROCEDURE — G8427 DOCREV CUR MEDS BY ELIG CLIN: HCPCS | Performed by: FAMILY MEDICINE

## 2022-09-02 PROCEDURE — G8400 PT W/DXA NO RESULTS DOC: HCPCS | Performed by: FAMILY MEDICINE

## 2022-09-02 PROCEDURE — 1090F PRES/ABSN URINE INCON ASSESS: CPT | Performed by: FAMILY MEDICINE

## 2022-09-02 PROCEDURE — 1036F TOBACCO NON-USER: CPT | Performed by: FAMILY MEDICINE

## 2022-09-02 PROCEDURE — 1123F ACP DISCUSS/DSCN MKR DOCD: CPT | Performed by: FAMILY MEDICINE

## 2022-09-02 PROCEDURE — 99214 OFFICE O/P EST MOD 30 MIN: CPT | Performed by: FAMILY MEDICINE

## 2022-09-02 PROCEDURE — G8417 CALC BMI ABV UP PARAM F/U: HCPCS | Performed by: FAMILY MEDICINE

## 2022-09-02 ASSESSMENT — PATIENT HEALTH QUESTIONNAIRE - PHQ9
10. IF YOU CHECKED OFF ANY PROBLEMS, HOW DIFFICULT HAVE THESE PROBLEMS MADE IT FOR YOU TO DO YOUR WORK, TAKE CARE OF THINGS AT HOME, OR GET ALONG WITH OTHER PEOPLE: 0
7. TROUBLE CONCENTRATING ON THINGS, SUCH AS READING THE NEWSPAPER OR WATCHING TELEVISION: 0
2. FEELING DOWN, DEPRESSED OR HOPELESS: 0
5. POOR APPETITE OR OVEREATING: 0
3. TROUBLE FALLING OR STAYING ASLEEP: 0
SUM OF ALL RESPONSES TO PHQ QUESTIONS 1-9: 0
1. LITTLE INTEREST OR PLEASURE IN DOING THINGS: 0
SUM OF ALL RESPONSES TO PHQ QUESTIONS 1-9: 0
8. MOVING OR SPEAKING SO SLOWLY THAT OTHER PEOPLE COULD HAVE NOTICED. OR THE OPPOSITE, BEING SO FIGETY OR RESTLESS THAT YOU HAVE BEEN MOVING AROUND A LOT MORE THAN USUAL: 0
6. FEELING BAD ABOUT YOURSELF - OR THAT YOU ARE A FAILURE OR HAVE LET YOURSELF OR YOUR FAMILY DOWN: 0
9. THOUGHTS THAT YOU WOULD BE BETTER OFF DEAD, OR OF HURTING YOURSELF: 0
SUM OF ALL RESPONSES TO PHQ QUESTIONS 1-9: 0
SUM OF ALL RESPONSES TO PHQ9 QUESTIONS 1 & 2: 0
SUM OF ALL RESPONSES TO PHQ QUESTIONS 1-9: 0
4. FEELING TIRED OR HAVING LITTLE ENERGY: 0

## 2022-09-02 NOTE — PROGRESS NOTES
[facility]  [id number] [place] [id number]  Dept: [id number] [facility] Roberto Spears is a 81 y.o. female Established patient, who presents today for her medical conditions/complaintsas noted below. Chief Complaint   Patient presents with    Fall     Pt here today after a fall x1 week ago on 8/26. HPI:     HPI  Feel a week ago;in the house hit the door. She bent over to clip her dog's leash to let the dog out. She feels more unstable with walking since her card cath and stent. Is on blood thinners  Some headaches and some dizziness, no N/V    Since her fall : spinning feeling when she lays down and lifts her head, lasts a few minutes. Her dog scratched her left arm yesterdayl    Reviewed prior notes Cardiology and Neurology  Reviewed previous Labs, Imaging, and Hospital Records  Cardiac cath with DAWSON  LDL Cholesterol (mg/dL)   Date Value   07/06/2020 85   10/17/2018 137 (H)   07/16/2018 64     LDL Calculated (mg/dL)   Date Value   03/12/2015 47   08/18/2014 39       (goal LDL is <100)   AST (U/L)   Date Value   03/20/2022 17     ALT (U/L)   Date Value   03/20/2022 16     BUN (mg/dL)   Date Value   03/20/2022 19     Hemoglobin A1C (%)   Date Value   08/03/2021 6.9 (H)     TSH (mIU/L)   Date Value   07/28/2021 1.98     BP Readings from Last 3 Encounters:   09/02/22 114/68   08/26/22 113/69   06/22/22 91/65          (goal 120/80)    Past Medical History:   Diagnosis Date    Age-related cognitive decline     Ankle pain     Left ankle fracture in ortho boat.     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[facility] 1990    left with urinary incontinenc post surgical    Caffeine use     2 coffee/day, 1-2 tea per week    Chronic back pain     Chronic ITP (idiopathic thrombocytopenia) (HCC)     CVA (cerebral infarction) 2008, 2010    balance issues, short term memory loss    Diabetic gastroparesis (HCC)     Diverticular disease 1986    GERD (gastroesophageal reflux disease)     Hiatal hernia     Hip fracture (HCC)     History of blood transfusion     History of decreased platelet count     Hyperlipemia     Hypertension     Internal hemorrhoid     Macular degeneration of left eye 1980's    S/P laser treatment    Nausea and vomiting     Neuropathy     Osteoarthritis     Ringing in ears     Self-catheterizes urinary bladder     6-7 times daily    TIA (transient ischemic attack) 2000    x1    Tubular adenoma     colon polyps    Type II or unspecified type diabetes mellitus without mention of complication, not stated as uncontrolled     Urinary incontinence     frequent UTI s/p infection, surgical dilatation lead to incontinence    Wears dentures     full on top, partial on bottom    Wears glasses       Past Surgical History:   Procedure Laterality Date    APPENDECTOMY  1962    BLADDER SURGERY      bladder stimulator    BLADDER SUSPENSION  2002    multiple    CARDIAC CATHETERIZATION  2008    no stents    CARDIOVASCULAR STRESS TEST  12/2014    CATARACT REMOVAL WITH IMPLANT Left 11/07/2017    Raffoul/StLiliane    CATARACT REMOVAL WITH IMPLANT Right 11/28/2017    Raffoalhaji/StLiliane    COLON SURGERY      colostomy reversal    COLONOSCOPY  04/07/2016    tubular adenoma x3; internal hemorrhoids    COLONOSCOPY N/A 11/06/2019    COLONOSCOPY DIAGNOSTIC performed by Harris Enamorado MD at . [facility]  11/06/2019    COLOSTOMY  1986    bowel obstruction/ rupture from diverticular disease, reversal 3 mos later    CORONARY ANGIOPLASTY WITH STENT PLACEMENT  08/04/2022    CYSTOSCOPY  09/08/2017    W/ 200IU Botox     FRACTURE SURGERY Right 2011    hip    FRACTURE SURGERY Left 2001    WRIST    FRACTURE SURGERY Left 2013    ankle    HERNIA REPAIR      HYSTERECTOMY (CERVIX STATUS UNKNOWN)  1969    JOINT REPLACEMENT Bilateral 2000    BILAT KNEES    LUMBAR FUSION  2017    L2/L3    MA XCAPSL CTRC RMVL INSJ IO LENS PROSTH W/O ECP Left 2017    EYE CATARACT EMULSIFICATION IOL IMPLANT performed by Abdullahi Turner MD at [address] W/O ECP Right 2017    EYE CATARACT EMULSIFICATION IOL IMPLANT performed by Abdullahi Turner MD at [address] Right 10/27/2015    WITH POLY EXCHANGE BIOMET AND GPS APPLICATION    REVISION TOTAL KNEE ARTHROPLASTY Left 2020    KNEE TOTAL ARTHROPLASTY REVISION - POLY EXCHANGE performed by Baron Azar MD at [address]      fusion, did not take    [address]    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      TONSILLECTOMY      UPPER GASTROINTESTINAL ENDOSCOPY      UPPER GASTROINTESTINAL ENDOSCOPY  2014    UPPER GASTROINTESTINAL ENDOSCOPY  2016    mild gastritis    UPPER GASTROINTESTINAL ENDOSCOPY N/A 2018    retained thick secretions in proximal esophagus       Family History   Problem Relation Age of Onset    Breast Cancer Mother     Cancer Mother         breast and uterine  39    Heart Disease Father     Heart Attack Father          28    Cancer Maternal Grandmother     Cancer Brother 46        bronchogenic adenocarcinoma cancer    Lung Cancer Brother     Cancer Sister         lung    Lung Cancer Sister          72    Breast Cancer Sister          62    Cancer Sister         breast       Social History     Tobacco Use    Smoking status: Former     Packs/day: 0.50     Years: 20.00     Pack years: 10.00     Types: Cigarettes     Quit date: 1982     Years since quittin.2    Smokeless tobacco: Former     Quit date: 1982   Substance Use Topics    Alcohol use: No      Current Outpatient Medications   Medication Sig Dispense Refill    clopidogrel (PLAVIX) 75 MG tablet Take 75 mg by mouth daily      nitroGLYCERIN (NITROSTAT) 0.4 MG SL tablet 1 under the tongue as needed for angina, may repeat q5mins for up three doses      RECTIV 0.4 % rectal ointment       metoprolol succinate (TOPROL XL) 25 MG extended release tablet Take 12.5 mg by mouth daily      lidocaine (XYLOCAINE) 5 % ointment       isosorbide mononitrate (IMDUR) 30 MG extended release tablet TAKE 1 TABLET BY MOUTH EVERY DAY      irbesartan (AVAPRO) 75 MG tablet TAKE 1 TABLET BY MOUTH EVERY DAY 30 tablet 3    LIVALO 2 MG TABS tablet TAKE 1 TABLET BY MOUTH IN THE EVENING 30 tablet 5    acetaminophen (TYLENOL) 500 MG tablet Take 2 tablets by mouth every 6 hours as needed for Pain 60 tablet 0    primidone (MYSOLINE) 50 MG tablet Take 50 mg by mouth 2 times daily       D-Mannose 350 MG CAPS Take 300 mg by mouth daily 30 capsule 3    nystatin-triamcinolone (MYCOLOG II) 811918-2.1 UNIT/GM-% cream Apply topically 4 times daily Apply topically 4 times daily. 30 g 3    lubiprostone (AMITIZA) 8 MCG CAPS capsule Take 1 capsule by mouth daily 30 capsule 1    nitrofurantoin, macrocrystal-monohydrate, (MACROBID) 100 MG capsule Take 1 capsule by mouth daily 30 capsule 11    lidocaine (XYLOCAINE) 2 % jelly APPLY TOPICALLY AS NEEDED      Wheat Dextrin (BENEFIBER DRINK MIX PO) Take by mouth 2 tablespoons every night       albuterol (PROVENTIL) (5 MG/ML) 0.5% nebulizer solution Inhale 0.5 mLs into the lungs as needed       nystatin (MYCOSTATIN) 427404 UNIT/GM powder Apply 3 times daily.  45 g 3    esomeprazole (NEXIUM) 40 MG delayed release capsule Take 1 capsule by mouth every morning (before breakfast) 90 capsule 3    hydrocortisone (ANUSOL-HC) 2.5 % CREA rectal cream Place rectally 2 times daily 28 g 3    vitamin B-12 (CYANOCOBALAMIN) 1000 MCG tablet Take 1 tablet by mouth once a week 30 tablet 3    aspirin 81 MG EC tablet Take 1 tablet by mouth 2 times daily 30 tablet 3    rOPINIRole (REQUIP) 4 MG tablet Take by mouth 3 times daily       glipiZIDE (GLUCOTROL) 5 MG tablet 5mg in the am and 2.5 mg in the pm      Continuous Blood Gluc Sensor (FREESTYLE DAVIS 14 DAY SENSOR) MISC       Multiple Vitamins-Minerals (THERAPEUTIC MULTIVITAMIN-MINERALS) tablet Take 1 tablet by mouth daily       Multiple Vitamins-Minerals (PRESERVISION AREDS PO) Take by mouth daily       Continuous Blood Gluc  (FREESTYLE DAVIS 14 DAY READER) MATTHEW       CRANBERRY PO Take by mouth 2 times daily       No current facility-administered medications for this visit. Facility-Administered Medications Ordered in Other Visits   Medication Dose Route Frequency Provider Last Rate Last Admin    0.9 % sodium chloride infusion 250 mL  250 mL IntraVENous Once Dank Pichardo MD         Allergies   Allergen Reactions    Iodides Anaphylaxis, Hives and Itching     \"Contrast dyes\"  This was added by someone but Patient states has had IVP dyes multiple times without problems and had a myelogram in Dec 2016 without problems    Povidone-Iodine Anaphylaxis, Hives and Swelling    Sulfa Antibiotics Hives and Swelling     Other reaction(s): Unknown  Other reaction(s): Intolerance-unknown  Hands, feet, mouth, eyes  Other reaction(s): Unknown    Betadine [Povidone Iodine] Hives    Cephalexin Hives and Swelling    Cephalexin Itching     Other reaction(s): Hallucinations  In 1969 received demerol and keflex together so was told to list both as allergies    Cozaar [Losartan] Nausea Only     Pt also gets sores on toungue    Cymbalta [Duloxetine Hcl] Diarrhea and Nausea And Vomiting     Weakness    Demerol Hives and Swelling    Doxycycline Other (See Comments)     Sore mouth        Meperidine Itching     Other reaction(s): Hallucinations      Morphine Hives and Itching    Penicillins Hives, Swelling and Itching     Other reaction(s): Intolerance-unknown  Other reaction(s): Unknown    Zithromax [Azithromycin] Other (See Comments)     Sore mouth      Clindamycin/Lincomycin     Etodolac     Fluorescein     Iodine      Other reaction(s):  Intolerance-unknown    Lisinopril      cough    Penicillin G     Soap Hector Babcock [Fesoterodine Fumarate Er]     Clonazepam Other (See Comments)     From neuro: made her too sleepy    Metformin And Related Nausea And Vomiting     Diarrhea and N/V       Health Maintenance   Topic Date Due    Annual Wellness Visit (AWV)  Never done    Lipids  09/08/2021    COVID-19 Vaccine (4 - Booster for Moderna series) 02/15/2022    Depression Monitoring  03/18/2022    Flu vaccine (1) 09/01/2022    DTaP/Tdap/Td vaccine (2 - Td or Tdap) 05/12/2032    DEXA (modify frequency per FRAX score)  Completed    Shingles vaccine  Completed    Pneumococcal 65+ years Vaccine  Completed    Hepatitis A vaccine  Aged Out    Hib vaccine  Aged Out    Meningococcal (ACWY) vaccine  Aged Out       Subjective:      Review of Systems    Objective:     /68   Pulse 54   Ht 4' 10\" (1.473 m)   Wt 148 lb 6.4 oz (67.3 kg)   SpO2 99%   BMI 31.02 kg/m²   Physical Exam  Vitals and nursing note reviewed. Constitutional:       General: She is not in acute distress. Appearance: She is well-developed. She is not ill-appearing. HENT:      Head: Normocephalic. Comments: Tender left zygomatic arch  Not really tender on forehead or bridge of nose. Not tender on mandible     Right Ear: External ear normal.      Left Ear: External ear normal.   Eyes:      General: No scleral icterus. Right eye: No discharge. Left eye: No discharge. Extraocular Movements: Extraocular movements intact. Conjunctiva/sclera: Conjunctivae normal.      Pupils: Pupils are equal, round, and reactive to light. Neck:      Thyroid: No thyromegaly. Trachea: No tracheal deviation. Pulmonary:      Effort: Pulmonary effort is normal. No respiratory distress. Musculoskeletal:      Comments: Uses her walker   Lymphadenopathy:      Cervical: No cervical adenopathy. Skin:     General: Skin is warm. Findings: Bruising and rash present.       Comments: Large bruising on face: forehead and cheek, worse on left side of face  Bruising right arm: healing  Healing abrasion and hematoma: left forearm. Hematoma left forehead. Neurological:      Mental Status: She is alert and oriented to person, place, and time. Psychiatric:         Mood and Affect: Mood normal.         Behavior: Behavior normal.         Thought Content: Thought content normal.       Assessment:       Diagnosis Orders   1. At high risk for falls        2. Fall, subsequent encounter  XR FACIAL BONES (MIN 3 VIEWS )      3. Contusion of face, subsequent encounter  XR FACIAL BONES (MIN 3 VIEWS )      4. Type 2 diabetes mellitus with diabetic peripheral angiopathy without gangrene, without long-term current use of insulin ([facility])        5. Vertigo               Plan:      Return for hypertension. Stop amlodipine due to low BP  Recheck 2 -3 months  If BP remains low will have to cut back on metoprolol or irbesartan. Heat  first and then cold packs to bruises. Orders Placed This Encounter   Procedures    XR FACIAL BONES (MIN 3 VIEWS )     Standing Status:   Future     Standing Expiration Date:   9/2/2023     No orders of the defined types were placed in this encounter. Patient given educationalmaterials - see patient instructions. Discussed use, benefit, and side effectsof prescribed medications. All patient questions answered. Pt voiced understanding. Reviewed health maintenance. Instructed to continue current medications, diet. Patient agreed with treatment plan. Follow up as directed.      Electronicallysigned by Kenny Ramos MD on 9/2/2022 at 1:35 PM

## 2022-09-05 ENCOUNTER — APPOINTMENT (OUTPATIENT)
Dept: GENERAL RADIOLOGY | Age: 82
End: 2022-09-05
Payer: MEDICARE

## 2022-09-05 ENCOUNTER — HOSPITAL ENCOUNTER (EMERGENCY)
Age: 82
Discharge: HOME | End: 2022-09-05
Attending: EMERGENCY MEDICINE
Payer: MEDICARE

## 2022-09-05 ENCOUNTER — APPOINTMENT (OUTPATIENT)
Dept: CT IMAGING | Age: 82
End: 2022-09-05
Payer: MEDICARE

## 2022-09-05 VITALS
WEIGHT: 141 LBS | HEART RATE: 67 BPM | SYSTOLIC BLOOD PRESSURE: 164 MMHG | HEIGHT: 59 IN | BODY MASS INDEX: 28.43 KG/M2 | RESPIRATION RATE: 19 BRPM | TEMPERATURE: 98.2 F | OXYGEN SATURATION: 98 % | DIASTOLIC BLOOD PRESSURE: 63 MMHG

## 2022-09-05 DIAGNOSIS — S63.501A SPRAIN OF RIGHT WRIST, INITIAL ENCOUNTER: ICD-10-CM
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
## 2022-09-05 PROCEDURE — 73120 X-RAY EXAM OF HAND: CPT

## 2022-09-05 PROCEDURE — 73090 X-RAY EXAM OF FOREARM: CPT

## 2022-09-05 PROCEDURE — 73070 X-RAY EXAM OF ELBOW: CPT

## 2022-09-05 PROCEDURE — 73060 X-RAY EXAM OF HUMERUS: CPT

## 2022-09-05 PROCEDURE — 99284 EMERGENCY DEPT VISIT MOD MDM: CPT

## 2022-09-05 PROCEDURE — 72125 CT NECK SPINE W/O DYE: CPT

## 2022-09-05 PROCEDURE — 73100 X-RAY EXAM OF WRIST: CPT

## 2022-09-05 PROCEDURE — 6370000000 HC RX 637 (ALT 250 FOR IP): Performed by: EMERGENCY MEDICINE

## 2022-09-05 PROCEDURE — 70450 CT HEAD/BRAIN W/O DYE: CPT

## 2022-09-05 RX ORDER — ACETAMINOPHEN 325 MG/1
650 TABLET ORAL ONCE
Status: COMPLETED | OUTPATIENT
Start: 2022-09-05 | End: 2022-09-05

## 2022-09-05 RX ORDER — HYDROCODONE BITARTRATE AND ACETAMINOPHEN 5; 325 MG/1; MG/1
1 TABLET ORAL ONCE
Status: DISCONTINUED | OUTPATIENT
Start: 2022-09-05 | End: 2022-09-05

## 2022-09-05 RX ADMIN — ACETAMINOPHEN 650 MG: 325 TABLET, FILM COATED ORAL at 11:01

## 2022-09-05 ASSESSMENT — LIFESTYLE VARIABLES: HOW OFTEN DO YOU HAVE A DRINK CONTAINING ALCOHOL: NEVER

## 2022-09-05 ASSESSMENT — ENCOUNTER SYMPTOMS
VOICE CHANGE: 0
VISUAL CHANGE: 0
CHEST TIGHTNESS: 0
TROUBLE SWALLOWING: 0
PHOTOPHOBIA: 0
NAUSEA: 0
BACK PAIN: 0
FACIAL SWELLING: 0
EYE PAIN: 0
ABDOMINAL PAIN: 0
VOMITING: 0
COLOR CHANGE: 0
SHORTNESS OF BREATH: 0

## 2022-09-05 ASSESSMENT — PAIN SCALES - GENERAL
PAINLEVEL_OUTOF10: 7
PAINLEVEL_OUTOF10: 6

## 2022-09-05 ASSESSMENT — PAIN DESCRIPTION - DESCRIPTORS: DESCRIPTORS: ACHING;SHARP

## 2022-09-05 ASSESSMENT — PAIN DESCRIPTION - LOCATION: LOCATION: WRIST

## 2022-09-05 ASSESSMENT — PAIN - FUNCTIONAL ASSESSMENT: PAIN_FUNCTIONAL_ASSESSMENT: 0-10

## 2022-09-05 ASSESSMENT — PAIN DESCRIPTION - ORIENTATION: ORIENTATION: LEFT

## 2022-09-05 NOTE — ED NOTES
Mode of arrival (squad #, walk in, police, etc) : Walk in        Chief complaint(s): Fall / Right wrist pain        Arrival Note (brief scenario, treatment PTA, etc). : Pt arrives to the ED with complaint of right wrist pain that occurred from a fall on Saturday. Pt describes a mechanical fall due to generalized weakness outside her home Saturday afternoon in the grass. Pt also recalls a mechanical fall involving her cane two weeks ago where she hit her head and obtained facial bruising; pt was evaluated at that time however and she was determined to have no intercranial abnormalities or bleeds. Upon arrival today pt rates the pain in her right wrist as 8/10; stemming from her hand all the way to her shoulder. Pt is A&OX4 and a good historian who denies chest pain, SOB, abdominal pain, or any associated symptoms or other areas of discomfort. Pt also reports that she is on blood thinners and has neuropathy which is what she believes to be the cause of her falls    C= \"Have you ever felt that you should Cut down on your drinking? \"  No  A= \"Have people Annoyed you by criticizing your drinking? \"  No  G= \"Have you ever felt bad or Guilty about your drinking? \"  No  E= \"Have you ever had a drink as an Eye-opener first thing in the morning to steady your nerves or to help a hangover? \"  No      Deferred []      Reason for deferring: N/A    *If yes to two or more: probable alcohol abuse. *     Igor Bueno RN  09/05/22 0031       Igor Bueno RN  09/05/22 0286

## 2022-09-05 NOTE — ED PROVIDER NOTES
EMERGENCY DEPARTMENT ENCOUNTER    Pt Name: Bg Lopez  MRN: [id number]  [place] 1940  Date of evaluation: 9/5/22  CHIEF COMPLAINT       Chief Complaint   Patient presents with    Fall     ON SAT NO LOC (FELL 2 WEEKS AGO FACIAL BRUISING) On Plavix. Wrist Pain     R wrist     HISTORY OF PRESENT ILLNESS   28-year-old female presents the ER complaining of a fall on Saturday. Patient states she was bending over to work with her tomatoes in the garden and ended up going down. Patient denies any syncope or loss of consciousness. Patient denies any dizziness, chest pain, shortness of breath. Patient does admit to right wrist pain and states the pain has been present since the fall and is getting worse. The history is provided by the patient. Fall  The accident occurred 2 days ago. Fall occurred: while bending over in garden. She fell from a height of 3 to 5 ft. She landed on A hard floor. The point of impact was the right wrist. The pain is present in the right wrist. The pain is at a severity of 5/10. There was No entrapment after the fall. Pertinent negatives include no visual change, no abdominal pain, no nausea, no vomiting, no headaches and no loss of consciousness. REVIEW OF SYSTEMS     Review of Systems   Constitutional:  Negative for activity change, appetite change and fatigue. HENT:  Negative for facial swelling, trouble swallowing and voice change. Eyes:  Negative for photophobia and pain. Respiratory:  Negative for chest tightness and shortness of breath. Cardiovascular:  Negative for chest pain and palpitations. Gastrointestinal:  Negative for abdominal pain, nausea and vomiting. Genitourinary:  Negative for dysuria and urgency. Musculoskeletal:  Positive for arthralgias (RUE). Negative for back pain. Skin:  Negative for color change and rash. Neurological:  Negative for dizziness, loss of consciousness, syncope and headaches.    Psychiatric/Behavioral:  Negative for behavioral problems and hallucinations. PASTMEDICAL HISTORY     Past Medical History:   Diagnosis Date    Age-related cognitive decline     Ankle pain     Left ankle fracture in ortho boat.     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    left with urinary incontinenc post surgical    Caffeine use     2 coffee/day, 1-2 tea per week    Chronic back pain     Chronic ITP (idiopathic thrombocytopenia) ([facility])     CVA (cerebral infarction) 2008, 2010    balance issues, short term memory loss    Diabetic gastroparesis ([address]     Diverticular disease 1986    GERD (gastroesophageal reflux disease)     Hiatal hernia     Hip fracture ([facility])     History of blood transfusion     History of decreased platelet count     Hyperlipemia     Hypertension     Internal hemorrhoid     Macular degeneration of left eye 1980's    S/P laser treatment    Nausea and vomiting     Neuropathy     Osteoarthritis     Ringing in ears     Self-catheterizes urinary bladder     6-7 times daily    TIA (transient ischemic attack) 2000    x1    Tubular adenoma     colon polyps    Type II or unspecified type diabetes mellitus without mention of complication, not stated as uncontrolled     Urinary incontinence     frequent UTI s/p infection, surgical dilatation lead to incontinence    Wears dentures     full on top, partial on bottom    Wears glasses      Past Problem List  Patient Active Problem List   Diagnosis Code    TIA (transient ischemic attack) G45.9    Chronic back pain M54.9, G89.29    Diabetic peripheral neuropathy ([address] E11.42    Macular degeneration of left eye H35.30    Constipation K59.00    Thrombocytopenia (HCC) D69.6    B12 deficiency E53.8    Vitamin D deficiency E55.9    Anemia D64.9    DDD (degenerative disc disease), cervical M50.30    Age-related cognitive decline R41.81    Acquired cyst of kidney N28.1    Microscopic hematuria R31.29    Bilateral renal cysts N28.1    Essential hypertension I10    Gastroesophageal reflux disease without esophagitis K21.9    Mixed incontinence N39.46    Recurrent UTI N39.0    Pure hypercholesterolemia E78.00    Hiatal hernia K44.9    Diabetic gastroparesis ([facility]) E11.43, K31.84    Internal hemorrhoid K64.8    Tubular adenoma D36.9    Colon polyps K63.5    Urge incontinence N39.41    S/P lumbar fusion Z98.1    Chronic ITP (idiopathic thrombocytopenia) ([facility]) D69.3    Urinary retention R33.9    Unsteadiness R26.81    Anxiety F41.9    Arthritis M19.90    Nausea R11.0    Diarrhea R19.7    Extrarenal pelvis [id number]    Primary osteoarthritis of left knee M17.12    Type 2 diabetes mellitus with diabetic peripheral angiopathy without gangrene, without long-term current use of insulin ([facility]) E11.51    Memory loss R41.3    Urethral pain R39.89    Bladder spasms N32.89    Atrophic vaginitis N95.2    Coronary artery disease involving native coronary artery of native heart without angina pectoris I25.10    Chronic fatigue R53.82    Essential tremor G25.0    Neuropathy G62.9    Coronary angioplasty status Z98.61     SURGICAL HISTORY       Past Surgical History:   Procedure Laterality Date    APPENDECTOMY  1962    BLADDER SURGERY      bladder stimulator    BLADDER SUSPENSION  2002    multiple    CARDIAC CATHETERIZATION  2008    no stents    CARDIOVASCULAR STRESS TEST  12/2014    CATARACT REMOVAL WITH IMPLANT Left 11/07/2017    [facility]    CATARACT REMOVAL WITH IMPLANT Right 11/28/2017    [facility]    COLON SURGERY      colostomy reversal    COLONOSCOPY  04/07/2016    tubular adenoma x3; internal hemorrhoids    COLONOSCOPY N/A 11/06/2019    COLONOSCOPY DIAGNOSTIC performed by Ulises Ramírez MD at . [facility]  11/06/2019    COLOSTOMY  1986    bowel obstruction/ rupture from diverticular disease, reversal 3 mos later    CORONARY ANGIOPLASTY WITH STENT PLACEMENT  08/04/2022    CYSTOSCOPY  09/08/2017    W/ 200IU Botox     FRACTURE SURGERY Right 2011    hip    FRACTURE SURGERY Left 2001    WRIST    FRACTURE SURGERY Left 2013    ankle    HERNIA REPAIR      HYSTERECTOMY (CERVIX STATUS UNKNOWN)  1969    JOINT REPLACEMENT Bilateral 2000    BILAT KNEES    LUMBAR FUSION  2017    L2/L3    MO XCAPSL CTRC RMVL INSJ IO LENS PROSTH W/O ECP Left 11/07/2017    EYE CATARACT EMULSIFICATION IOL IMPLANT performed by Imani Toro MD at [address] W/O ECP Right 11/28/2017    EYE CATARACT EMULSIFICATION IOL IMPLANT performed by Imani Toro MD at [address] Right 10/27/2015    WITH POLY EXCHANGE BIOMET AND GPS APPLICATION    REVISION TOTAL KNEE ARTHROPLASTY Left 07/14/2020    KNEE TOTAL ARTHROPLASTY REVISION - POLY EXCHANGE performed by Yeni Garcia MD at [address] [facility]  2010    fusion, did not take    [address]    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      TONSILLECTOMY  1978    UPPER GASTROINTESTINAL ENDOSCOPY      UPPER GASTROINTESTINAL ENDOSCOPY  05/05/2014    UPPER GASTROINTESTINAL ENDOSCOPY  04/07/2016    mild gastritis    UPPER GASTROINTESTINAL ENDOSCOPY N/A 07/17/2018    retained thick secretions in proximal esophagus     CURRENT MEDICATIONS       Discharge Medication List as of 9/5/2022 10:46 AM        CONTINUE these medications which have NOT CHANGED    Details   clopidogrel (PLAVIX) 75 MG tablet Take 75 mg by mouth dailyHistorical Med      nitroGLYCERIN (NITROSTAT) 0.4 MG SL tablet 1 under the tongue as needed for angina, may repeat q5mins for up three dosesHistorical Med      RECTIV 0.4 % rectal ointment DAWHistorical Med      metoprolol succinate (TOPROL XL) 25 MG extended release tablet Take 12.5 mg by mouth dailyHistorical Med      lidocaine (XYLOCAINE) 5 % ointment Historical Med      isosorbide mononitrate (IMDUR) 30 MG extended release tablet TAKE 1 TABLET BY MOUTH EVERY DAYHistorical Med      irbesartan (AVAPRO) 75 MG tablet TAKE 1 TABLET BY MOUTH EVERY DAY, Disp-30 tablet, R-3Normal      LIVALO 2 MG TABS tablet TAKE 1 TABLET BY MOUTH IN THE EVENING, Disp-30 tablet, R-5, DAWNormal      acetaminophen (TYLENOL) 500 MG tablet Take 2 tablets by mouth every 6 hours as needed for Pain, Disp-60 tablet, R-0Print      primidone (MYSOLINE) 50 MG tablet Take 50 mg by mouth 2 times daily Historical Med      D-Mannose 350 MG CAPS Take 300 mg by mouth daily, Disp-30 capsule, R-3Normal      nystatin-triamcinolone (MYCOLOG II) 486230-3.1 UNIT/GM-% cream Apply topically 4 times daily Apply topically 4 times daily. , Topical, 4 TIMES DAILY Starting Thu 8/19/2021, Disp-30 g, R-3, Normal      lubiprostone (AMITIZA) 8 MCG CAPS capsule Take 1 capsule by mouth daily, Disp-30 capsule, R-1Normal      nitrofurantoin, macrocrystal-monohydrate, (MACROBID) 100 MG capsule Take 1 capsule by mouth daily, Disp-30 capsule, R-11Normal      lidocaine (XYLOCAINE) 2 % jelly APPLY TOPICALLY AS NEEDED, Historical Med      Wheat Dextrin (BENEFIBER DRINK MIX PO) Take by mouth 2 tablespoons every night Historical Med      albuterol (PROVENTIL) (5 MG/ML) 0.5% nebulizer solution Inhale 0.5 mLs into the lungs as needed Historical Med      nystatin (MYCOSTATIN) 879419 UNIT/GM powder Apply 3 times daily. , Disp-45 g, R-3, Normal      esomeprazole (NEXIUM) 40 MG delayed release capsule Take 1 capsule by mouth every morning (before breakfast), Disp-90 capsule, R-3Normal      hydrocortisone (ANUSOL-HC) 2.5 % CREA rectal cream Place rectally 2 times daily, Rectal, 2 TIMES DAILY Starting Mon 1/4/2021, Disp-28 g, R-3, Normal      vitamin B-12 (CYANOCOBALAMIN) 1000 MCG tablet Take 1 tablet by mouth once a week, Disp-30 tablet,R-3OTC      aspirin 81 MG EC tablet Take 1 tablet by mouth 2 times daily, Disp-30 tablet,R-3Normal      rOPINIRole (REQUIP) 4 MG tablet Take by mouth 3 times daily Historical Med      glipiZIDE (GLUCOTROL) 5 MG tablet 5mg in the toxic-appearing. HENT:      Head: Normocephalic and atraumatic. Right Ear: External ear normal.      Left Ear: External ear normal.      Nose: No congestion or rhinorrhea. Eyes:      Extraocular Movements: Extraocular movements intact. Pupils: Pupils are equal, round, and reactive to light. Cardiovascular:      Rate and Rhythm: Normal rate and regular rhythm. Pulses: Normal pulses. Heart sounds: Normal heart sounds. Pulmonary:      Effort: Pulmonary effort is normal. No respiratory distress. Breath sounds: Normal breath sounds. No wheezing. Abdominal:      General: Bowel sounds are normal. There is no distension. Palpations: Abdomen is soft. Tenderness: There is no abdominal tenderness. Musculoskeletal:         General: No deformity or signs of injury. Normal range of motion. Right upper arm: Tenderness present. Right elbow: Tenderness present. Right forearm: Tenderness present. Right wrist: Tenderness present. Cervical back: No rigidity or tenderness. Skin:     General: Skin is warm and dry. Neurological:      Mental Status: She is alert and oriented to person, place, and time. Mental status is at baseline. Psychiatric:         Mood and Affect: Mood normal.         Behavior: Behavior normal.       MEDICAL DECISION MAKING:   Patient likely with a wrist sprain secondary to a fall. CT and x-ray imaging in the ER did not display signs of acute abnormality. Patient describes the fall as purely mechanical.  Patient instructed to follow-up with primary care physician within 2 days and to return to the ER immediately if symptoms worsen or change. Patient instructed to have a discussion with her primary care physician regarding the frequent falls that she is having. Patient understands and agrees with the plan.          CRITICAL CARE:       PROCEDURES:    Procedures    DIAGNOSTIC RESULTS   EKG:All EKG's are interpreted by the Emergency Department Physician who either signs or Co-signs this chart in the absence of a cardiologist.        RADIOLOGY:All plain film, CT, MRI, and formal ultrasound images (except ED bedside ultrasound) are read by the radiologist, see reports below, unless otherwisenoted in MDM or here. XR WRIST RIGHT (2 VIEWS)   Final Result   No acute osseous or soft tissue abnormality. XR RADIUS ULNA RIGHT (2 VIEWS)   Final Result   No acute osseous or soft tissue abnormality. XR ELBOW RIGHT (2 VIEWS)   Final Result   No acute osseous or soft tissue abnormality. XR HUMERUS RIGHT (MIN 2 VIEWS)   Final Result   No acute osseous or soft tissue abnormality. XR HAND RIGHT (2 VIEWS)   Final Result   Diffuse degenerative joint disease without acute osseous or soft tissue   abnormality. CT CERVICAL SPINE WO CONTRAST   Final Result   No acute abnormality of the cervical spine. Moderate degenerative changes. CT HEAD WO CONTRAST   Final Result   Stable chronic changes without acute intracranial abnormality. LABS: All lab results were reviewed by myself, and all abnormals are listed below. Labs Reviewed - No data to display    EMERGENCY DEPARTMENTCOURSE:         Vitals:    Vitals:    09/05/22 0901 09/05/22 0903   BP: (!) 164/63    Pulse: 67    Resp: 19    Temp: 98.2 °F (36.8 °C)    TempSrc: Oral    SpO2: 98%    Weight:  141 lb (64 kg)   Height:  4' 11\" (1.499 m)       The patient was given the following medications while in the emergency department:  Orders Placed This Encounter   Medications    DISCONTD: HYDROcodone-acetaminophen (NORCO) 5-325 MG per tablet 1 tablet    acetaminophen (TYLENOL) tablet 650 mg     CONSULTS:  None    FINAL IMPRESSION      1. Fall, initial encounter    2. Sprain of right wrist, initial encounter          DISPOSITION/PLAN   DISPOSITION Decision To Discharge 09/05/2022 10:45:32 AM      PATIENT REFERRED TO:  Umm Patel MD  [address]   [address] [address] [place]  [phone]    Schedule an appointment as soon as possible for a visit in 2 days      Bud [place], [address] [facility] [address]  [phone]    Schedule an appointment as soon as possible for a visit in 2 days      [facility] ED  [facility] [address]  [address] [address]  [phone]  Go to   As needed, If symptoms worsen  DISCHARGE MEDICATIONS:  Discharge Medication List as of 9/5/2022 10:46 AM        The care is provided during an unprecedented national emergency due to the novel coronavirus, COVID 19.   DO Matthew Briseno DO  09/05/22 1707

## 2022-09-05 NOTE — ED NOTES
Pt requested pain medication and upon patient teaching she decided she would rather just have tylenol for pain than Zuri Gonzalez.   MD notified and provided verbal order for this RN to order Tylenol     Phuong Franco RN  09/05/22 0074

## 2022-09-13 ENCOUNTER — OFFICE VISIT (OUTPATIENT)
Dept: ORTHOPEDIC SURGERY | Age: 82
End: 2022-09-13
Payer: MEDICARE

## 2022-09-13 VITALS — BODY MASS INDEX: 28.43 KG/M2 | HEIGHT: 59 IN | WEIGHT: 141 LBS | RESPIRATION RATE: 14 BRPM

## 2022-09-13 DIAGNOSIS — M43.10 ACQUIRED SPONDYLOLISTHESIS: ICD-10-CM
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
## 2022-09-13 DIAGNOSIS — M50.30 DEGENERATIVE DISC DISEASE, CERVICAL: Primary | ICD-10-CM

## 2022-09-13 DIAGNOSIS — M96.1 POST LAMINECTOMY SYNDROME: ICD-10-CM

## 2022-09-13 DIAGNOSIS — M54.50 LOW BACK PAIN, UNSPECIFIED BACK PAIN LATERALITY, UNSPECIFIED CHRONICITY, UNSPECIFIED WHETHER SCIATICA PRESENT: ICD-10-CM

## 2022-09-13 PROCEDURE — 1090F PRES/ABSN URINE INCON ASSESS: CPT | Performed by: ORTHOPAEDIC SURGERY

## 2022-09-13 PROCEDURE — 1123F ACP DISCUSS/DSCN MKR DOCD: CPT | Performed by: ORTHOPAEDIC SURGERY

## 2022-09-13 PROCEDURE — G8417 CALC BMI ABV UP PARAM F/U: HCPCS | Performed by: ORTHOPAEDIC SURGERY

## 2022-09-13 PROCEDURE — 1036F TOBACCO NON-USER: CPT | Performed by: ORTHOPAEDIC SURGERY

## 2022-09-13 PROCEDURE — 99213 OFFICE O/P EST LOW 20 MIN: CPT | Performed by: ORTHOPAEDIC SURGERY

## 2022-09-13 PROCEDURE — G8400 PT W/DXA NO RESULTS DOC: HCPCS | Performed by: ORTHOPAEDIC SURGERY

## 2022-09-13 PROCEDURE — G8427 DOCREV CUR MEDS BY ELIG CLIN: HCPCS | Performed by: ORTHOPAEDIC SURGERY

## 2022-09-13 NOTE — PROGRESS NOTES
Patient ID: Caleb Mcdaniel is a 81 y.o. female    Chief Compliant:  Chief Complaint   Patient presents with    Lower Back Pain     New issue: LBP post fall 3-4 weeks ago        Diagnostic imaging:    AP lateral lumbar spine grade 2 spondylolisthesis L5-S1 ankylosed history of L4 to sacrum fusion history of L1-2 PLIF history of peripheral nerve generator moderate bilateral hip arthritis history of percutaneous pinning right no change in comparison to x-rays 12/9/2021    Assessment and Plan:  1. Degenerative disc disease, cervical    2. Acquired spondylolisthesis    3. Post laminectomy syndrome    4. Low back pain, unspecified back pain laterality, unspecified chronicity, unspecified whether sciatica present        Low back pain with bi-radicular hip pain, right worse than left    Bone scan-evaluate for occult fracture    Follow up after bone scan    HPI:  This is a 81 y.o. female who presents to the clinic today for a 6 month follow up for left sided neck pain and headaches. Patient ambulates with assistance via walker. Patient notes that she fell down the steps 4 weeks ago. She states that she has lower back pain with radicular hip pain. She notes that she has significant pain when she tries to lay on her left side. Patient states that she has hip pain, right worse than left. She notes it is worsened with sitting for extended periods of time. She states that her pain is worsened when standing or walking. Patient notes that her peripheral nerve stimulator is not helping with her extremities. Review of Systems   All other systems reviewed and are negative.       Past History:    Current Outpatient Medications:     clopidogrel (PLAVIX) 75 MG tablet, Take 75 mg by mouth daily, Disp: , Rfl:     nitroGLYCERIN (NITROSTAT) 0.4 MG SL tablet, 1 under the tongue as needed for angina, may repeat q5mins for up three doses, Disp: , Rfl:     RECTIV 0.4 % rectal ointment, , Disp: , Rfl:     metoprolol succinate (TOPROL XL) 25 MG extended release tablet, Take 12.5 mg by mouth daily, Disp: , Rfl:     lidocaine (XYLOCAINE) 5 % ointment, , Disp: , Rfl:     isosorbide mononitrate (IMDUR) 30 MG extended release tablet, TAKE 1 TABLET BY MOUTH EVERY DAY, Disp: , Rfl:     irbesartan (AVAPRO) 75 MG tablet, TAKE 1 TABLET BY MOUTH EVERY DAY, Disp: 30 tablet, Rfl: 3    LIVALO 2 MG TABS tablet, TAKE 1 TABLET BY MOUTH IN THE EVENING, Disp: 30 tablet, Rfl: 5    acetaminophen (TYLENOL) 500 MG tablet, Take 2 tablets by mouth every 6 hours as needed for Pain, Disp: 60 tablet, Rfl: 0    primidone (MYSOLINE) 50 MG tablet, Take 50 mg by mouth 2 times daily , Disp: , Rfl:     D-Mannose 350 MG CAPS, Take 300 mg by mouth daily, Disp: 30 capsule, Rfl: 3    nystatin-triamcinolone (MYCOLOG II) 249565-1.1 UNIT/GM-% cream, Apply topically 4 times daily Apply topically 4 times daily. , Disp: 30 g, Rfl: 3    lubiprostone (AMITIZA) 8 MCG CAPS capsule, Take 1 capsule by mouth daily, Disp: 30 capsule, Rfl: 1    nitrofurantoin, macrocrystal-monohydrate, (MACROBID) 100 MG capsule, Take 1 capsule by mouth daily, Disp: 30 capsule, Rfl: 11    lidocaine (XYLOCAINE) 2 % jelly, APPLY TOPICALLY AS NEEDED, Disp: , Rfl:     Wheat Dextrin (BENEFIBER DRINK MIX PO), Take by mouth 2 tablespoons every night , Disp: , Rfl:     albuterol (PROVENTIL) (5 MG/ML) 0.5% nebulizer solution, Inhale 0.5 mLs into the lungs as needed , Disp: , Rfl:     nystatin (MYCOSTATIN) 694067 UNIT/GM powder, Apply 3 times daily. , Disp: 45 g, Rfl: 3    esomeprazole (NEXIUM) 40 MG delayed release capsule, Take 1 capsule by mouth every morning (before breakfast), Disp: 90 capsule, Rfl: 3    hydrocortisone (ANUSOL-HC) 2.5 % CREA rectal cream, Place rectally 2 times daily, Disp: 28 g, Rfl: 3    vitamin B-12 (CYANOCOBALAMIN) 1000 MCG tablet, Take 1 tablet by mouth once a week, Disp: 30 tablet, Rfl: 3    aspirin 81 MG EC tablet, Take 1 tablet by mouth 2 times daily, Disp: 30 tablet, Rfl: 3    rOPINIRole (REQUIP) 4 MG tablet, Take by mouth 3 times daily , Disp: , Rfl:     glipiZIDE (GLUCOTROL) 5 MG tablet, 5mg in the am and 2.5 mg in the pm, Disp: , Rfl:     Continuous Blood Gluc Sensor (FREESTYLE DAVIS 14 DAY SENSOR) MISC, , Disp: , Rfl:     Multiple Vitamins-Minerals (THERAPEUTIC MULTIVITAMIN-MINERALS) tablet, Take 1 tablet by mouth daily , Disp: , Rfl:     Multiple Vitamins-Minerals (PRESERVISION AREDS PO), Take by mouth daily , Disp: , Rfl:     Continuous Blood Gluc  (FREESTYLE DAVIS 14 DAY READER) MATTHEW, , Disp: , Rfl:     CRANBERRY PO, Take by mouth 2 times daily, Disp: , Rfl:   Allergies   Allergen Reactions    Iodides Anaphylaxis, Hives and Itching     \"Contrast dyes\"  This was added by someone but Patient states has had IVP dyes multiple times without problems and had a myelogram in Dec 2016 without problems    Povidone-Iodine Anaphylaxis, Hives and Swelling    Sulfa Antibiotics Hives and Swelling     Other reaction(s): Unknown  Other reaction(s): Intolerance-unknown  Hands, feet, mouth, eyes  Other reaction(s): Unknown    Betadine [Povidone Iodine] Hives    Cephalexin Hives and Swelling    Cephalexin Itching     Other reaction(s): Hallucinations  In 1969 received demerol and keflex together so was told to list both as allergies    Cozaar [Losartan] Nausea Only     Pt also gets sores on toungue    Cymbalta [Duloxetine Hcl] Diarrhea and Nausea And Vomiting     Weakness    Demerol Hives and Swelling    Doxycycline Other (See Comments)     Sore mouth        Meperidine Itching     Other reaction(s): Hallucinations      Morphine Hives and Itching    Penicillins Hives, Swelling and Itching     Other reaction(s): Intolerance-unknown  Other reaction(s): Unknown    Zithromax [Azithromycin] Other (See Comments)     Sore mouth      Clindamycin/Lincomycin     Etodolac     Fluorescein     Iodine      Other reaction(s):  Intolerance-unknown    Lisinopril      cough    Penicillin G     Soap     Toviaz [Fesoterodine Fumarate Er]     Clonazepam Other (See Comments)     From neuro: made her too sleepy    Metformin And Related Nausea And Vomiting     Diarrhea and N/V     Social History     Socioeconomic History    Marital status:      Spouse name: Not on file    Number of children: Not on file    Years of education: Not on file    Highest education level: Not on file   Occupational History    Occupation: retired   Tobacco Use    Smoking status: Former     Packs/day: 0.50     Years: 20.00     Pack years: 10.00     Types: Cigarettes     Quit date: 1982     Years since quittin.3    Smokeless tobacco: Former     Quit date: 1982   Vaping Use    Vaping Use: Never used   Substance and Sexual Activity    Alcohol use: No    Drug use: No    Sexual activity: Not on file   Other Topics Concern    Not on file   Social History Narrative    Not on file     Social Determinants of Health     Financial Resource Strain: Low Risk     Difficulty of Paying Living Expenses: Not hard at all   Food Insecurity: No Food Insecurity    Worried About Running Out of Food in the Last Year: Never true    Ran Out of Food in the Last Year: Never true   Transportation Needs: Not on file   Physical Activity: Not on file   Stress: Not on file   Social Connections: Not on file   Intimate Partner Violence: Not on file   Housing Stability: Not on file     Past Medical History:   Diagnosis Date    Age-related cognitive decline     Ankle pain     Left ankle fracture in ortho boat.     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[facility] [address]     left with urinary incontinenc post surgical    Caffeine use     2 coffee/day, 1-2 tea per week    Chronic back pain     Chronic ITP (idiopathic thrombocytopenia) ([facility])     CVA (cerebral infarction) ,     balance issues, short term memory loss    Diabetic gastroparesis ([address]     Diverticular disease     GERD (gastroesophageal reflux disease)     Hiatal hernia     Hip fracture ([address]     History of blood transfusion     History of decreased platelet count     Hyperlipemia     Hypertension     Internal hemorrhoid     Macular degeneration of left eye 1980's    S/P laser treatment    Nausea and vomiting     Neuropathy     Osteoarthritis     Ringing in ears     Self-catheterizes urinary bladder     6-7 times daily    TIA (transient ischemic attack) 2000    x1    Tubular adenoma     colon polyps    Type II or unspecified type diabetes mellitus without mention of complication, not stated as uncontrolled     Urinary incontinence     frequent UTI s/p infection, surgical dilatation lead to incontinence    Wears dentures     full on top, partial on bottom    Wears glasses      Past Surgical History:   Procedure Laterality Date    APPENDECTOMY  1962    BLADDER SURGERY      bladder stimulator    BLADDER SUSPENSION  2002    multiple    CARDIAC CATHETERIZATION  2008    no stents    CARDIOVASCULAR STRESS TEST  12/2014    CATARACT REMOVAL WITH IMPLANT Left 11/07/2017    [facility]    CATARACT REMOVAL WITH IMPLANT Right 11/28/2017    [facility]    COLON SURGERY      colostomy reversal    COLONOSCOPY  04/07/2016    tubular adenoma x3; internal hemorrhoids    COLONOSCOPY N/A 11/06/2019    COLONOSCOPY DIAGNOSTIC performed by Anu Lang MD at . [facility]  11/06/2019    COLOSTOMY  1986    bowel obstruction/ rupture from diverticular disease, reversal 3 mos later    CORONARY ANGIOPLASTY WITH STENT PLACEMENT  08/04/2022    CYSTOSCOPY  09/08/2017    W/ 200IU Botox     FRACTURE SURGERY Right 2011    hip    FRACTURE SURGERY Left 2001    WRIST    FRACTURE SURGERY Left 2013    ankle    HERNIA REPAIR      HYSTERECTOMY (CERVIX STATUS UNKNOWN)  1969    JOINT REPLACEMENT Bilateral 2000    BILAT KNEES    LUMBAR FUSION  2017    L2/L3    IA XCAPSL CTRC RMVL INSJ IO LENS PROSTH W/O ECP Left 11/07/2017    EYE CATARACT EMULSIFICATION IOL IMPLANT performed by Ksenia Jarquin MD at [address] W/O ECP Right 2017    EYE CATARACT EMULSIFICATION IOL IMPLANT performed by Cheyanne Barrera MD at [address] Right 10/27/2015    WITH POLY EXCHANGE BIOMET AND GPS APPLICATION    REVISION TOTAL KNEE ARTHROPLASTY Left 2020    KNEE TOTAL ARTHROPLASTY REVISION - POLY EXCHANGE performed by Negin Chopra MD at [address]      fusion, did not take    [address]    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      TONSILLECTOMY      UPPER GASTROINTESTINAL ENDOSCOPY      UPPER GASTROINTESTINAL ENDOSCOPY  2014    UPPER GASTROINTESTINAL ENDOSCOPY  2016    mild gastritis    UPPER GASTROINTESTINAL ENDOSCOPY N/A 2018    retained thick secretions in proximal esophagus     Family History   Problem Relation Age of Onset    Breast Cancer Mother     Cancer Mother         breast and uterine  39    Heart Disease Father     Heart Attack Father          28    Cancer Maternal Grandmother     Cancer Brother 46        bronchogenic adenocarcinoma cancer    Lung Cancer Brother     Cancer Sister         lung    Lung Cancer Sister          72    Breast Cancer Sister          62    Cancer Sister         breast        Physical Exam:  Vitals signs and nursing note reviewed. Constitutional:       Appearance: well-developed. HENT:      Head: Normocephalic and atraumatic. Nose: Nose normal.   Eyes:      Conjunctiva/sclera: Conjunctivae normal.   Neck:      Musculoskeletal: Normal range of motion and neck supple. Pulmonary:      Effort: Pulmonary effort is normal. No respiratory distress. Musculoskeletal:      Comments: Normal gait     Skin:     General: Skin is warm and dry. Neurological:      Mental Status: Alert and oriented to person, place, and time. Sensory: No sensory deficit. Psychiatric:         Behavior: Behavior normal.         Thought Content: Thought content normal.    Patient is ambulatory with a very short stride gait typically uses a 3 wheeled Rollator but is able to ambulate independently    Provider Attestation:  Cate Bonner, personally performed the services described in this documentation. All medical record entries made by the scribe were at my direction and in my presence. I have reviewed the chart and discharge instructions and agree that the records reflect my personal performance and is accurate and complete. Agustina Valverde MD 9/13/22       Scribe Attestation:  By signing my name below, Alma Sarah, attest that this documentation has been prepared under the direction and in the presence of Dr. Timothy Jeong. Electronically signed: Cary Martinez, 9/13/22     Please note that this chart was generated using voice recognition Dragon dictation software. Although every effort was made to ensure the accuracy of this automated transcription, some errors in transcription may have occurred.

## 2022-09-14 ENCOUNTER — TELEPHONE (OUTPATIENT)
Dept: ORTHOPEDIC SURGERY | Age: 82
End: 2022-09-14

## 2022-09-16 ENCOUNTER — NURSE ONLY (OUTPATIENT)
Dept: PRIMARY CARE CLINIC | Age: 82
End: 2022-09-16

## 2022-09-16 VITALS
DIASTOLIC BLOOD PRESSURE: 62 MMHG | SYSTOLIC BLOOD PRESSURE: 120 MMHG | WEIGHT: 151.6 LBS | OXYGEN SATURATION: 97 % | BODY MASS INDEX: 30.62 KG/M2 | HEART RATE: 77 BPM

## 2022-09-16 DIAGNOSIS — I95.9 HYPOTENSION, UNSPECIFIED HYPOTENSION TYPE: Primary | ICD-10-CM

## 2022-09-16 NOTE — PROGRESS NOTES
Pt here for blood pressure check. Today it is 120/62. Per Dr. Joann Newton pt to stay off amlodipine and keep f/u appointment. Pt advised to call prn.

## 2022-09-20 ENCOUNTER — HOSPITAL ENCOUNTER (OUTPATIENT)
Dept: NUCLEAR MEDICINE | Age: 82
Discharge: HOME | End: 2022-09-22
Payer: MEDICARE

## 2022-09-20 ENCOUNTER — OFFICE VISIT (OUTPATIENT)
Dept: PRIMARY CARE CLINIC | Age: 82
End: 2022-09-20
Payer: MEDICARE

## 2022-09-20 VITALS
DIASTOLIC BLOOD PRESSURE: 70 MMHG | WEIGHT: 148.8 LBS | HEIGHT: 59 IN | BODY MASS INDEX: 30 KG/M2 | OXYGEN SATURATION: 96 % | HEART RATE: 70 BPM | SYSTOLIC BLOOD PRESSURE: 106 MMHG

## 2022-09-20 DIAGNOSIS — J02.9 SORE THROAT: ICD-10-CM
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
## 2022-09-20 DIAGNOSIS — F33.40 RECURRENT MAJOR DEPRESSIVE DISORDER, IN REMISSION (HCC): ICD-10-CM

## 2022-09-20 DIAGNOSIS — M54.50 LOW BACK PAIN, UNSPECIFIED BACK PAIN LATERALITY, UNSPECIFIED CHRONICITY, UNSPECIFIED WHETHER SCIATICA PRESENT: ICD-10-CM

## 2022-09-20 DIAGNOSIS — R05.9 COUGH: Primary | ICD-10-CM

## 2022-09-20 LAB
INFLUENZA A ANTIBODY: NEGATIVE
INFLUENZA B ANTIBODY: NEGATIVE

## 2022-09-20 PROCEDURE — 1123F ACP DISCUSS/DSCN MKR DOCD: CPT | Performed by: PHYSICIAN ASSISTANT

## 2022-09-20 PROCEDURE — G8400 PT W/DXA NO RESULTS DOC: HCPCS | Performed by: PHYSICIAN ASSISTANT

## 2022-09-20 PROCEDURE — 1090F PRES/ABSN URINE INCON ASSESS: CPT | Performed by: PHYSICIAN ASSISTANT

## 2022-09-20 PROCEDURE — 1036F TOBACCO NON-USER: CPT | Performed by: PHYSICIAN ASSISTANT

## 2022-09-20 PROCEDURE — A9503 TC99M MEDRONATE: HCPCS | Performed by: ORTHOPAEDIC SURGERY

## 2022-09-20 PROCEDURE — 99213 OFFICE O/P EST LOW 20 MIN: CPT | Performed by: PHYSICIAN ASSISTANT

## 2022-09-20 PROCEDURE — 2580000003 HC RX 258: Performed by: ORTHOPAEDIC SURGERY

## 2022-09-20 PROCEDURE — 3430000000 HC RX DIAGNOSTIC RADIOPHARMACEUTICAL: Performed by: ORTHOPAEDIC SURGERY

## 2022-09-20 PROCEDURE — 78306 BONE IMAGING WHOLE BODY: CPT | Performed by: ORTHOPAEDIC SURGERY

## 2022-09-20 PROCEDURE — 87804 INFLUENZA ASSAY W/OPTIC: CPT | Performed by: PHYSICIAN ASSISTANT

## 2022-09-20 PROCEDURE — G8417 CALC BMI ABV UP PARAM F/U: HCPCS | Performed by: PHYSICIAN ASSISTANT

## 2022-09-20 PROCEDURE — G8427 DOCREV CUR MEDS BY ELIG CLIN: HCPCS | Performed by: PHYSICIAN ASSISTANT

## 2022-09-20 RX ORDER — CIPROFLOXACIN 500 MG/1
500 TABLET, FILM COATED ORAL 2 TIMES DAILY
Qty: 14 TABLET | Refills: 0 | Status: SHIPPED | OUTPATIENT
Start: 2022-09-20 | End: 2022-09-27

## 2022-09-20 RX ORDER — SODIUM CHLORIDE 0.9 % (FLUSH) 0.9 %
10 SYRINGE (ML) INJECTION PRN
Status: DISCONTINUED | OUTPATIENT
Start: 2022-09-20 | End: 2022-09-23 | Stop reason: HOSPADM

## 2022-09-20 RX ORDER — TC 99M MEDRONATE 20 MG/10ML
25 INJECTION, POWDER, LYOPHILIZED, FOR SOLUTION INTRAVENOUS
Status: COMPLETED | OUTPATIENT
Start: 2022-09-20 | End: 2022-09-20

## 2022-09-20 RX ADMIN — TC 99M MEDRONATE 25.8 MILLICURIE: 20 INJECTION, POWDER, LYOPHILIZED, FOR SOLUTION INTRAVENOUS at 08:50

## 2022-09-20 RX ADMIN — SODIUM CHLORIDE, PRESERVATIVE FREE 10 ML: 5 INJECTION INTRAVENOUS at 08:51

## 2022-09-20 ASSESSMENT — ENCOUNTER SYMPTOMS
NAUSEA: 0
SHORTNESS OF BREATH: 1
VOMITING: 0
ABDOMINAL PAIN: 0
COUGH: 1
WHEEZING: 1
SINUS PRESSURE: 0
SORE THROAT: 1

## 2022-09-20 NOTE — PROGRESS NOTES
[address] [place] [address] PRIMARY CARE  [id number] [address]  [facility] [address]  Dept: [id number] [facility] Gabriel Woodard is a 81 y.o. female Established patient, who presents today for her medical conditions/complaints as noted below. Chief Complaint   Patient presents with    Cough     Pt c/o wet cough. Pt states sx started x4 days ago. Pt states shes taking otc meds to treat sx. Pharyngitis    Nasal Congestion       HPI:     Cough  Associated symptoms include myalgias, a sore throat, shortness of breath and wheezing. Pertinent negatives include no chills, ear pain or fever. Pharyngitis  Associated symptoms include congestion, coughing, fatigue, myalgias, a sore throat and weakness. Pertinent negatives include no abdominal pain, chills, fever, nausea or vomiting. : Started 4 days ago with runny nose, got cloroseptic for sore throat and taking tylenol PM. Coughing now and getting phlem up. No fevers. Yellow phlem from cough. Appetite loss. Many allergies to antibiotics. Reviewed prior notes None  Reviewed previous Labs    LDL Cholesterol (mg/dL)   Date Value   07/06/2020 85   10/17/2018 137 (H)   07/16/2018 64     LDL Calculated (mg/dL)   Date Value   03/12/2015 47   08/18/2014 39       (goal LDL is <100)   AST (U/L)   Date Value   03/20/2022 17     ALT (U/L)   Date Value   03/20/2022 16     BUN (mg/dL)   Date Value   03/20/2022 19     Hemoglobin A1C (%)   Date Value   08/03/2021 6.9 (H)     TSH (mIU/L)   Date Value   07/28/2021 1.98     BP Readings from Last 3 Encounters:   09/20/22 106/70   09/16/22 120/62   09/05/22 (!) 164/63          (goal 120/80)  Lab Results   Component Value Date    LABA1C 6.9 (H) 08/03/2021     Lab Results   Component Value Date     08/03/2021     Past Medical History:   Diagnosis Date    Age-related cognitive decline     Ankle pain     Left ankle fracture in ortho boat.     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    left with urinary incontinenc post surgical    Caffeine use     2 coffee/day, 1-2 tea per week    Chronic back pain     Chronic ITP (idiopathic thrombocytopenia) (HCC)     CVA (cerebral infarction) 2008, 2010    balance issues, short term memory loss    Diabetic gastroparesis ([address]     Diverticular disease 1986    GERD (gastroesophageal reflux disease)     Hiatal hernia     Hip fracture (HCC)     History of blood transfusion     History of decreased platelet count     Hyperlipemia     Hypertension     Internal hemorrhoid     Macular degeneration of left eye 1980's    S/P laser treatment    Nausea and vomiting     Neuropathy     Osteoarthritis     Ringing in ears     Self-catheterizes urinary bladder     6-7 times daily    TIA (transient ischemic attack) 2000    x1    Tubular adenoma     colon polyps    Type II or unspecified type diabetes mellitus without mention of complication, not stated as uncontrolled     Urinary incontinence     frequent UTI s/p infection, surgical dilatation lead to incontinence    Wears dentures     full on top, partial on bottom    Wears glasses       Past Surgical History:   Procedure Laterality Date    APPENDECTOMY  1962    BLADDER SURGERY      bladder stimulator    BLADDER SUSPENSION  2002    multiple    CARDIAC CATHETERIZATION  2008    no stents    CARDIOVASCULAR STRESS TEST  12/2014    CATARACT REMOVAL WITH IMPLANT Left 11/07/2017    [facility]    CATARACT REMOVAL WITH IMPLANT Right 11/28/2017    [facility]    COLON SURGERY      colostomy reversal    COLONOSCOPY  04/07/2016    tubular adenoma x3; internal hemorrhoids    COLONOSCOPY N/A 11/06/2019    COLONOSCOPY DIAGNOSTIC performed by Carli Rodarte MD at . [facility]  11/06/2019    COLOSTOMY  1986    bowel obstruction/ rupture from diverticular disease, reversal 3 mos later    CORONARY ANGIOPLASTY WITH STENT PLACEMENT  08/04/2022    CYSTOSCOPY  09/08/2017    W/ 200IU Botox FRACTURE SURGERY Right     hip    FRACTURE SURGERY Left     WRIST    FRACTURE SURGERY Left 2013    ankle    HERNIA REPAIR      HYSTERECTOMY (CERVIX STATUS UNKNOWN)  1969    JOINT REPLACEMENT Bilateral     BILAT KNEES    LUMBAR FUSION  2017    L2/L3    MA XCAPSL CTRC RMVL INSJ IO LENS PROSTH W/O ECP Left 2017    EYE CATARACT EMULSIFICATION IOL IMPLANT performed by Park Kinney MD at [address] [employer] W/O ECP Right 2017    EYE CATARACT EMULSIFICATION IOL IMPLANT performed by Park Kinney MD at [address] [facility] [address] Right 10/27/2015    WITH POLY EXCHANGE BIOMET AND GPS APPLICATION    REVISION TOTAL KNEE ARTHROPLASTY Left 2020    KNEE TOTAL ARTHROPLASTY REVISION - POLY EXCHANGE performed by Dilcia Turner MD at [facility]      fusion, did not take    [address]    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      TONSILLECTOMY      UPPER GASTROINTESTINAL ENDOSCOPY      UPPER GASTROINTESTINAL ENDOSCOPY  2014    UPPER GASTROINTESTINAL ENDOSCOPY  2016    mild gastritis    UPPER GASTROINTESTINAL ENDOSCOPY N/A 2018    retained thick secretions in proximal esophagus       Family History   Problem Relation Age of Onset    Breast Cancer Mother     Cancer Mother         breast and uterine  39    Heart Disease Father     Heart Attack Father          28    Cancer Maternal Grandmother     Cancer Brother 46        bronchogenic adenocarcinoma cancer    Lung Cancer Brother     Cancer Sister         lung    Lung Cancer Sister          72    Breast Cancer Sister          62    Cancer Sister         breast       Social History     Tobacco Use    Smoking status: Former     Packs/day: 0.50     Years: 20.00     Pack years: 10.00     Types: Cigarettes     Quit date: 1982     Years since quittin.3 Smokeless tobacco: Former     Quit date: 6/13/1982   Substance Use Topics    Alcohol use: No      Current Outpatient Medications   Medication Sig Dispense Refill    ciprofloxacin (CIPRO) 500 MG tablet Take 1 tablet by mouth 2 times daily for 7 days 14 tablet 0    nitrofurantoin, macrocrystal-monohydrate, (MACROBID) 100 MG capsule TAKE 1 CAPSULE BY MOUTH EVERY DAY 30 capsule 5    clopidogrel (PLAVIX) 75 MG tablet Take 75 mg by mouth daily      nitroGLYCERIN (NITROSTAT) 0.4 MG SL tablet 1 under the tongue as needed for angina, may repeat q5mins for up three doses      RECTIV 0.4 % rectal ointment       metoprolol succinate (TOPROL XL) 25 MG extended release tablet Take 12.5 mg by mouth daily      lidocaine (XYLOCAINE) 5 % ointment       irbesartan (AVAPRO) 75 MG tablet TAKE 1 TABLET BY MOUTH EVERY DAY 30 tablet 3    LIVALO 2 MG TABS tablet TAKE 1 TABLET BY MOUTH IN THE EVENING 30 tablet 5    acetaminophen (TYLENOL) 500 MG tablet Take 2 tablets by mouth every 6 hours as needed for Pain 60 tablet 0    primidone (MYSOLINE) 50 MG tablet Take 50 mg by mouth 2 times daily       D-Mannose 350 MG CAPS Take 300 mg by mouth daily 30 capsule 3    nystatin-triamcinolone (MYCOLOG II) 836535-4.1 UNIT/GM-% cream Apply topically 4 times daily Apply topically 4 times daily. 30 g 3    lubiprostone (AMITIZA) 8 MCG CAPS capsule Take 1 capsule by mouth daily 30 capsule 1    lidocaine (XYLOCAINE) 2 % jelly APPLY TOPICALLY AS NEEDED      Wheat Dextrin (BENEFIBER DRINK MIX PO) Take by mouth 2 tablespoons every night       albuterol (PROVENTIL) (5 MG/ML) 0.5% nebulizer solution Inhale 0.5 mLs into the lungs as needed       nystatin (MYCOSTATIN) 945383 UNIT/GM powder Apply 3 times daily.  45 g 3    esomeprazole (NEXIUM) 40 MG delayed release capsule Take 1 capsule by mouth every morning (before breakfast) 90 capsule 3    hydrocortisone (ANUSOL-HC) 2.5 % CREA rectal cream Place rectally 2 times daily 28 g 3    vitamin B-12 (CYANOCOBALAMIN) 1000 MCG tablet Take 1 tablet by mouth once a week 30 tablet 3    aspirin 81 MG EC tablet Take 1 tablet by mouth 2 times daily 30 tablet 3    rOPINIRole (REQUIP) 4 MG tablet Take by mouth 3 times daily       glipiZIDE (GLUCOTROL) 5 MG tablet 5mg in the am and 2.5 mg in the pm      Continuous Blood Gluc Sensor (FREESTYLE DAVIS 14 DAY SENSOR) MISC       Multiple Vitamins-Minerals (THERAPEUTIC MULTIVITAMIN-MINERALS) tablet Take 1 tablet by mouth daily       Multiple Vitamins-Minerals (PRESERVISION AREDS PO) Take by mouth daily       Continuous Blood Gluc  (FREESTYLE DAVIS 14 DAY READER) MATTHEW       CRANBERRY PO Take by mouth 2 times daily       No current facility-administered medications for this visit. Facility-Administered Medications Ordered in Other Visits   Medication Dose Route Frequency Provider Last Rate Last Admin    sodium chloride flush 0.9 % injection 10 mL  10 mL IntraVENous PRN Angela Merchant MD   10 mL at 09/20/22 0851    0.9 % sodium chloride infusion 250 mL  250 mL IntraVENous Once Thomas Devlin MD         Allergies   Allergen Reactions    Iodides Anaphylaxis, Hives and Itching     \"Contrast dyes\"  This was added by someone but Patient states has had IVP dyes multiple times without problems and had a myelogram in Dec 2016 without problems    Povidone-Iodine Anaphylaxis, Hives and Swelling    Sulfa Antibiotics Hives and Swelling     Other reaction(s): Unknown  Other reaction(s):  Intolerance-unknown  Hands, feet, mouth, eyes  Other reaction(s): Unknown    Betadine [Povidone Iodine] Hives    Cephalexin Hives and Swelling    Cephalexin Itching     Other reaction(s): Hallucinations  In 1969 received demerol and keflex together so was told to list both as allergies    Cozaar [Losartan] Nausea Only     Pt also gets sores on toungue    Cymbalta [Duloxetine Hcl] Diarrhea and Nausea And Vomiting     Weakness    Demerol Hives and Swelling    Doxycycline Other (See Comments)     Sore mouth Meperidine Itching     Other reaction(s): Hallucinations      Morphine Hives and Itching    Penicillins Hives, Swelling and Itching     Other reaction(s): Intolerance-unknown  Other reaction(s): Unknown    Zithromax [Azithromycin] Other (See Comments)     Sore mouth      Clindamycin/Lincomycin     Etodolac     Fluorescein     Iodine      Other reaction(s): Intolerance-unknown    Lisinopril      cough    Penicillin G     Soap     Toviaz [Fesoterodine Fumarate Er]     Clonazepam Other (See Comments)     From neuro: made her too sleepy    Metformin And Related Nausea And Vomiting     Diarrhea and N/V       Health Maintenance   Topic Date Due    Annual Wellness Visit (AWV)  Never done    Lipids  09/08/2021    COVID-19 Vaccine (4 - Booster for Moderna series) 02/15/2022    Flu vaccine (1) 09/01/2022    Depression Monitoring  09/02/2023    DTaP/Tdap/Td vaccine (2 - Td or Tdap) 05/12/2032    DEXA (modify frequency per FRAX score)  Completed    Shingles vaccine  Completed    Pneumococcal 65+ years Vaccine  Completed    Hepatitis A vaccine  Aged Out    Hib vaccine  Aged Out    Meningococcal (ACWY) vaccine  Aged Out       Subjective:      Review of Systems   Constitutional:  Positive for fatigue. Negative for chills and fever. HENT:  Positive for congestion and sore throat. Negative for ear pain and sinus pressure. Respiratory:  Positive for cough, shortness of breath and wheezing. Gastrointestinal:  Negative for abdominal pain, nausea and vomiting. Musculoskeletal:  Positive for myalgias. Neurological:  Positive for weakness. Objective:     /70   Pulse 70   Ht 4' 11\" (1.499 m)   Wt 148 lb 12.8 oz (67.5 kg)   SpO2 96%   BMI 30.05 kg/m²   Physical Exam  Constitutional:       General: She is not in acute distress. Appearance: Normal appearance. She is not ill-appearing. HENT:      Head: Normocephalic and atraumatic.       Right Ear: Tympanic membrane, ear canal and external ear normal.      Left Ear: Tympanic membrane, ear canal and external ear normal.      Nose: Rhinorrhea present. Mouth/Throat:      Mouth: Mucous membranes are moist.      Pharynx: No oropharyngeal exudate or posterior oropharyngeal erythema. Eyes:      Extraocular Movements: Extraocular movements intact. Neck:      Vascular: No carotid bruit. Cardiovascular:      Rate and Rhythm: Normal rate and regular rhythm. Pulses: Normal pulses. Heart sounds: Normal heart sounds. Pulmonary:      Effort: Pulmonary effort is normal. No respiratory distress. Breath sounds: Wheezing present. Lymphadenopathy:      Cervical: No cervical adenopathy. Skin:     General: Skin is dry. Neurological:      Mental Status: She is alert and oriented to person, place, and time. Psychiatric:         Mood and Affect: Mood normal.         Behavior: Behavior normal.         Thought Content: Thought content normal.       Assessment and Plan:          1. Cough  -     COVID-19; Future  -     ciprofloxacin (CIPRO) 500 MG tablet; Take 1 tablet by mouth 2 times daily for 7 days, Disp-14 tablet, R-0Normal  -     XR CHEST (2 VW); Future  -     POCT Influenza A/B  2. Recurrent major depressive disorder, in remission ([facility] [address]  3. Sore throat  -     COVID-19; Future  -     ciprofloxacin (CIPRO) 500 MG tablet; Take 1 tablet by mouth 2 times daily for 7 days, Disp-14 tablet, R-0Normal  -     POCT Influenza A/B       Watch sugars extra close while on cipro with glucotrol for low sugar reactions. Return for AWV with Dr. Alvin Hall . Patient given educational materials - see patient instructions. Discussed use, benefit, and side effects of prescribed medications. All patient questions answered. Pt voiced understanding. Reviewed health maintenance. Instructed to continue current medications, diet and exercise. Patient agreed with treatment plan. Follow up as directed.      Electronically signed by AC Nichols on 9/20/2022 at 10:03 AM

## 2022-09-21 LAB — SARS-COV-2: NORMAL

## 2022-10-03 ENCOUNTER — HOSPITAL ENCOUNTER (OUTPATIENT)
Dept: CARDIAC REHAB | Age: 82
Setting detail: THERAPIES SERIES
Discharge: HOME | End: 2022-10-03
Payer: MEDICARE

## 2022-10-03 ENCOUNTER — TELEPHONE (OUTPATIENT)
Dept: PRIMARY CARE CLINIC | Age: 82
End: 2022-10-03

## 2022-10-03 VITALS — RESPIRATION RATE: 16 BRPM | OXYGEN SATURATION: 98 % | WEIGHT: 154 LBS | HEIGHT: 59 IN | BODY MASS INDEX: 31.04 KG/M2

## 2022-10-03 PROCEDURE — 93797 PHYS/QHP OP CAR RHAB WO ECG: CPT

## 2022-10-03 PROCEDURE — 93798 PHYS/QHP OP CAR RHAB W/ECG: CPT

## 2022-10-03 ASSESSMENT — LIFESTYLE VARIABLES: CIGARETTES_PER_DAY: 1/2 PPD

## 2022-10-03 ASSESSMENT — PATIENT HEALTH QUESTIONNAIRE - PHQ9
SUM OF ALL RESPONSES TO PHQ QUESTIONS 1-9: 10
SUM OF ALL RESPONSES TO PHQ QUESTIONS 1-9: 10
4. FEELING TIRED OR HAVING LITTLE ENERGY: 1
10. IF YOU CHECKED OFF ANY PROBLEMS, HOW DIFFICULT HAVE THESE PROBLEMS MADE IT FOR YOU TO DO YOUR WORK, TAKE CARE OF THINGS AT HOME, OR GET ALONG WITH OTHER PEOPLE: 1
1. LITTLE INTEREST OR PLEASURE IN DOING THINGS: 2
SUM OF ALL RESPONSES TO PHQ QUESTIONS 1-9: 10
6. FEELING BAD ABOUT YOURSELF - OR THAT YOU ARE A FAILURE OR HAVE LET YOURSELF OR YOUR FAMILY DOWN: 1
SUM OF ALL RESPONSES TO PHQ QUESTIONS 1-9: 10
SUM OF ALL RESPONSES TO PHQ9 QUESTIONS 1 & 2: 4
8. MOVING OR SPEAKING SO SLOWLY THAT OTHER PEOPLE COULD HAVE NOTICED. OR THE OPPOSITE, BEING SO FIGETY OR RESTLESS THAT YOU HAVE BEEN MOVING AROUND A LOT MORE THAN USUAL: 2
2. FEELING DOWN, DEPRESSED OR HOPELESS: 2
5. POOR APPETITE OR OVEREATING: 1
3. TROUBLE FALLING OR STAYING ASLEEP: 0
7. TROUBLE CONCENTRATING ON THINGS, SUCH AS READING THE NEWSPAPER OR WATCHING TELEVISION: 1
9. THOUGHTS THAT YOU WOULD BE BETTER OFF DEAD, OR OF HURTING YOURSELF: 0

## 2022-10-03 ASSESSMENT — EXERCISE STRESS TEST
PEAK_BP: 116/68
PEAK_METS: 2.1
PEAK_HR: 78
PEAK_RPE: 14
PEAK_BP: 116/68

## 2022-10-03 NOTE — TELEPHONE ENCOUNTER
+ depression screening from cardiac rehab nurse, please have patient f/u in the office for her depression

## 2022-10-03 NOTE — PROGRESS NOTES
Please Review PHQ 9 data attached     10/03/22 1016   Special Test   Test Cardiac Knowledge Test;DASI;PHQ 9   [facility] Activity Status Index   Can You Take Care of Yourself 2.75   Can You Walk Indoors 1.75   Can You Walk a Block or Two on Level Ground 0   Can You Climb a Flight of Stairs or Walk Up a Hill 5.50   Can You Run a Short Distance 0   Can You Do Light Work Around the [facility] 2.70   Can You Do Moderate Work Around the [facility] 3.50   Can You Do Heavy Work Around the [facility] 8.00   Can You Do Yardwork 0   Can You Have Sexual Relation 0   Can You Participate in Moderate Recreational Activites 0   Can You Participate in Strenuous Sports 0   DASI Total Score 24.2   Estimated Peak Oxygen Uptake 5.72 mL/min   Cardiac Knowledge Test   Most People Can Tell Whether or Not They Have High Blood Pressure 0-True   Walking and Gardening are Considered Types of Exercise That Can Lower Heart Disease Risk 1-True   Taking an Aspirin Each Day Decreases the Risk of Getting Heart Disease 1-True   Heart Disease is the Leading Cause of Death in the [facility] 1-True   A Healthy Person's Pulse Should Return to Normal Within 15 Minutes After Exercise 1-True   HDL Refers to \"Good\" Cholesterol, and LDL Refers to \"Bad\" Cholesterol 1-True   \"High\" Blood Pressure is Defined as 880/82 (Systolic/Diastolic) or Higher 1-False   Most Women are More Likely to Die From Breast Cancer than Heart Disease 1-False   People Who Have Diabetes are at Higher Risk of Getting Heart Disease 1-True   Many Vegetables are High in Cholesterol 1-False   Cardiac Knowledge Total Score 9   KEYLA Index for COPD   BMI (Calculated) 31.2   PHQ-2 Over the past 2 weeks, how often have you been bothered by any of the following problems? Little interest or pleasure in doing things 2   Feeling down, depressed, or hopeless 2   PHQ-2 Score 4   PHQ-9 Over the past 2 weeks, how often have you been bothered by any of the following problems?    Trouble falling or staying asleep, or sleeping too much 0   Feeling tired or having little energy 1   Poor appetite or overeating 1   Feeling bad about yourself - or that you are a failure or have let yourself or your family down 1   Trouble concentrating on things, such as reading the newspaper or watching television 1   Moving or speaking so slowly that other people could have noticed. Or the opposite - being so fidgety or restless that you have been moving around a lot more than usual 2   Thoughts that you would be better off dead, or of hurting yourself in some way 0   If you checked off any problems, how difficult have these problems made it for you to do your work, take care of things at home, or get along with other people?  1   PHQ-9 Total Score 10

## 2022-10-03 NOTE — DISCHARGE INSTRUCTIONS
Cardiac Rehabilitation: After Your Visit  Your Care Instructions  Cardiac rehabilitation is a program for people who have had a heart attack or bypass surgery, or who have other heart problems. The program includes exercise, lifestyle changes, education, and emotional support. Cardiac rehab can help you improve the quality of your life through better overall health. It can help you lose weight and feel better about yourself. On your cardiac rehab team, you may have your doctor, a nurse specialist, a dietitian, and a physical therapist. They will design your cardiac rehab program specifically for you. You will learn how to reduce your risk for heart problems, how to manage stress, and how to eat a heart-healthy diet. By the end of the program, you will be ready to maintain a healthier lifestyle on your own. Follow-up care is a key part of your treatment and safety. Be sure to make and go to all appointments, and call your doctor if you are having problems. Its also a good idea to know your test results and keep a list of the medicines you take. How can you care for yourself at home? Take your medicines exactly as prescribed. Call your doctor if you think you are having a problem with your medicine. You will get more details on the specific medicines your doctor prescribes. Monitor your weight by weighing yourself at least once a week on the same scale with the same amount of clothing at the same time of day. Plan your meals so that you are eating heart-healthy foods. Eat a variety of foods daily. Fresh fruits and vegetables and whole-grains are good choices. Limit your fat intake, especially saturated and trans fat. Limit salt (sodium). Increase fiber in your diet. Limit alcohol. Learn how to take your pulse so that you can track your heart rate during exercise. Always check with your doctor before you begin a new exercise program.  Warm up before you exercise and cool down afterward.  This will help your heart gradually prepare for and recover from exercise and avoid pushing your heart too hard. Stop exercising if you have any unusual discomfort, such as chest pain. Do not smoke. Smoking can make heart problems worse. If you need help quitting, talk to your doctor about stop-smoking programs and medicines. These can increase your chances of quitting for good. When should you call for help? Call 911 anytime you think you may need emergency care. For example, call if:  You have severe trouble breathing. You cough up pink, foamy mucus and you have trouble breathing. You have symptoms of a heart attack. These may include:  Chest pain or pressure, or a strange feeling in the chest.  Sweating. Shortness of breath. Nausea or vomiting. Pain, pressure, or a strange feeling in the back, neck, jaw, or upper belly or in one or both shoulders or arms. Lightheadedness or sudden weakness. A fast or irregular heartbeat. After you call 911, the  may tell you to chew 1 adult-strength or 2 to 4 low-dose aspirin. Wait for an ambulance. Do not try to drive yourself. You have signs of a stroke such as:  Sudden numbness, paralysis, or weakness in your face, arm, or leg, especially on only one side of your body. New problems with walking or balance. Sudden vision changes. Drooling or slurred speech. New problems speaking or understanding simple statements, or feeling confused. A sudden, severe headache that is different from past headaches. You passed out (lost consciousness). Call your doctor now or seek immediate medical care if:  You have new or increased shortness of breath. You are dizzy or lightheaded, or you feel like you may faint. You gain 2 to 3 pounds or more over 2 days. You have increased swelling in your legs, ankles, or feet. Watch closely for changes in your health, and be sure to contact your doctor if you have any problems.     EXERCISE GUIDELINES    If you experience any unusual feelings during exercise, please inform the RN immediately. Take your prescribed medications before class or at the normal time. Wear comfortable clothes and shoes. Please arrive on time for your class. If you arrive too early, you may have to wait. Park in Cardiac Rehab/ Heart Failure Parking spots or use our free [address] parking. Please call us if you need to cancel. This is very important and will prevent you from being discharged from the program. Our direct number is [phone]. Please advise us if you have had any chest pain, shortness of breath, or other problems since your last session. We are here to help you return to a more active and healthy lifestyle. In addition to the RN's who will work with you at all times during your sessions, we have a dietician and a psychologist to assist as needed. Please don't hesitate to ask any questions or discuss any concerns you may have. NITROGLYCERIN TABLET INSTRUCTIONS  Nitroglycerin belongs to the group of medications called nitrates. It is used to improve blood flow to the heart. It also makes it easier for your heart to pump effectively. Nitroglycerin does this by relaxing and opening your veins and small arteries. Nitroglycerin's main purpose is to relive the pain of angina attacks. It is available by prescription only. PROPER USE OF NITROGLYCERIN:  1. Place the tablet under your tongue only (sublingual.) Nitroglycerin tablets are ineffective if chewed or swallowed. Hold saliva in your mouth as long as possible to permit best absorption from the lining of your mouth. Do not eat, drink, chew, or smoke while a tablet is dissolving.   2. Signs of angina (inadequate blood supply to the heart) include: pain, discomfort, heaviness, squeezing or tightness in the chest, arms, jaw, throat, or upper back; numbness and tingling of one or both arms and hands; sudden onset of extreme weakness or fatigue; nausea, extreme sweating, indigestion or a choking sensation. If you experience any of these signs you should:     A. Immediately rest. A sitting position is best for your heart. However if you are dizzy, lie down. B. Take a nitroglycerin tablet. You may take one tablet every 5 minutes for a total of three tablets. C. Relax. Concentrate on relaxing all the muscles in your body and breathing slowly and easily. D. If the pain lasts longer than 15 minutes, CALL 911. E. After the symptoms subside, rest at least 30 minutes while sitting or lying. Nitroglycerin works within 1-3 minutes and is out of your system with 30-60 minutes. You may experience a headache, dizziness, light-headedness, or a faint feeling, especially when getting up from a sitting or lying position. STORAGE OF NITROGLYCERIN:  1. Keep the tablets in their original brown bottle. (They are sensitive to heat, moisture, and light.)  2. Remove the cotton plug that comes in the bottle and do not put it back in.  3. Put the cap on the bottle quickly and tightly after each use. 4. To select a tablet for use, pour several into the bottle cap, take one, and pour the others back into the bottle. Do not hold them in the palm of your hand because they will  moisture and lose effect. 5. Do not keep other medications in the same bottle with the nitroglycerin since they will weaken the nitroglycerin effect. 6. Keep the medicine handy at all times but do not carry the bottle close to your body. Nitroglycerin may lose strength because of body warmth. Instead carry the bottle in your purse, jacket pocket, or loose fitting clothing. 7. Store additional nitroglycerin tablets in a cool, dry place. Average room temperature away form direct heat or sunlight is best. Do not sore in the bathroom medicine cabinet or refrigerator because the moisture usually present in these areas will spoil the tablets. 8. Be sure to check the expiration date on the bottle.  Nitroglycerin tablets lose their effectiveness (potency) after the expiration date. Generally, tablets are good for 6 months. Ask for new tablets at least 2 weeks before they are due to . SIDE EFFECTS:  The most common side effects of nitroglycerin are headache, dizziness, and flushing of the face and neck. These side effects are caused by increased blood flow through the relaxed blood vessels. If you have any concerns, let your health care provider know. If you develop a skin rash let your healthcare provider know. Nitroglycerin is not habit forming or addicting. Do not be afraid to use it. Record all angina attacks relieved by taking nitroglycerin and show this record to your healthcare provider at your regular check - up. If you have an increasing number of controlled attacks (symptoms go away after taking two or fewer nitroglycerin tablets and resting), notify your healthcare provider. He or she may need to adjust your daily medication or want to see you. Additionally, report any change in the type of angina to your healthcare provider within 24 hours of its occurrence. Always report angina experienced at night while sleeping winthin 24 hours of its occurrence. Where can you learn more? Go to https://[employer]. org and sign in to your [employer] account. Enter P997 in the [facility] box to learn more about Cardiac Rehabilitation: After Your Visit.     If you do not have an account, please click on the Sign Up Now link. © 2359-9538 Healthwise, Incorporated. Care instructions adapted under license by [facility]. This care instruction is for use with your licensed healthcare professional. If you have questions about a medical condition or this instruction, always ask your healthcare professional. [place] [address] any warranty or liability for your use of this information.   Content Version: 3.3.314892; Last Revised: 2013

## 2022-10-03 NOTE — PROGRESS NOTES
Pt oriented to Cardiac Rehab, goals set and ITP initiated. CAD Risk Factors & Prevention video viewed. PT UNSURE  IF SHE CAN DO CARDIAC REHAB; WILL LOOK AT HER SCHEDULE AND WILL CALL TOMORROW AFTER SHE LOOKS AT HER CALENDER.

## 2022-10-17 ENCOUNTER — OFFICE VISIT (OUTPATIENT)
Dept: PRIMARY CARE CLINIC | Age: 82
End: 2022-10-17
Payer: MEDICARE

## 2022-10-17 VITALS
DIASTOLIC BLOOD PRESSURE: 76 MMHG | OXYGEN SATURATION: 96 % | BODY MASS INDEX: 30.08 KG/M2 | SYSTOLIC BLOOD PRESSURE: 128 MMHG | WEIGHT: 149.2 LBS | HEIGHT: 59 IN | HEART RATE: 86 BPM

## 2022-10-17 DIAGNOSIS — Z23 NEED FOR VACCINATION: ICD-10-CM

## 2022-10-17 DIAGNOSIS — R05.2 SUBACUTE COUGH: Primary | ICD-10-CM

## 2022-10-17 PROCEDURE — 1090F PRES/ABSN URINE INCON ASSESS: CPT | Performed by: FAMILY MEDICINE

## 2022-10-17 PROCEDURE — G8427 DOCREV CUR MEDS BY ELIG CLIN: HCPCS | Performed by: FAMILY MEDICINE

## 2022-10-17 PROCEDURE — 99214 OFFICE O/P EST MOD 30 MIN: CPT | Performed by: FAMILY MEDICINE

## 2022-10-17 PROCEDURE — G0008 ADMIN INFLUENZA VIRUS VAC: HCPCS | Performed by: FAMILY MEDICINE

## 2022-10-17 PROCEDURE — 1036F TOBACCO NON-USER: CPT | Performed by: FAMILY MEDICINE

## 2022-10-17 PROCEDURE — 1123F ACP DISCUSS/DSCN MKR DOCD: CPT | Performed by: FAMILY MEDICINE

## 2022-10-17 PROCEDURE — 90694 VACC AIIV4 NO PRSRV 0.5ML IM: CPT | Performed by: FAMILY MEDICINE

## 2022-10-17 PROCEDURE — G8400 PT W/DXA NO RESULTS DOC: HCPCS | Performed by: FAMILY MEDICINE

## 2022-10-17 PROCEDURE — G8484 FLU IMMUNIZE NO ADMIN: HCPCS | Performed by: FAMILY MEDICINE

## 2022-10-17 PROCEDURE — G8417 CALC BMI ABV UP PARAM F/U: HCPCS | Performed by: FAMILY MEDICINE

## 2022-10-17 RX ORDER — LEVOFLOXACIN 250 MG/1
250 TABLET ORAL DAILY
Qty: 10 TABLET | Refills: 0 | Status: SHIPPED | OUTPATIENT
Start: 2022-10-17 | End: 2022-10-27
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
## 2022-10-25 ENCOUNTER — OFFICE VISIT (OUTPATIENT)
Dept: ORTHOPEDIC SURGERY | Age: 82
End: 2022-10-25
Payer: MEDICARE

## 2022-10-25 VITALS — HEIGHT: 59 IN | RESPIRATION RATE: 14 BRPM | BODY MASS INDEX: 30.04 KG/M2 | WEIGHT: 149 LBS

## 2022-10-25 DIAGNOSIS — M50.30 DEGENERATIVE DISC DISEASE, CERVICAL: Primary | ICD-10-CM

## 2022-10-25 DIAGNOSIS — M96.1 POST LAMINECTOMY SYNDROME: ICD-10-CM

## 2022-10-25 DIAGNOSIS — G89.29 CHRONIC MIDLINE LOW BACK PAIN WITHOUT SCIATICA: ICD-10-CM

## 2022-10-25 DIAGNOSIS — M43.10 ACQUIRED SPONDYLOLISTHESIS: ICD-10-CM

## 2022-10-25 DIAGNOSIS — M54.50 CHRONIC MIDLINE LOW BACK PAIN WITHOUT SCIATICA: ICD-10-CM

## 2022-10-25 PROCEDURE — 99213 OFFICE O/P EST LOW 20 MIN: CPT | Performed by: ORTHOPAEDIC SURGERY

## 2022-10-25 PROCEDURE — G8427 DOCREV CUR MEDS BY ELIG CLIN: HCPCS | Performed by: ORTHOPAEDIC SURGERY

## 2022-10-25 PROCEDURE — G8400 PT W/DXA NO RESULTS DOC: HCPCS | Performed by: ORTHOPAEDIC SURGERY

## 2022-10-25 PROCEDURE — 1090F PRES/ABSN URINE INCON ASSESS: CPT | Performed by: ORTHOPAEDIC SURGERY

## 2022-10-25 PROCEDURE — 1123F ACP DISCUSS/DSCN MKR DOCD: CPT | Performed by: ORTHOPAEDIC SURGERY

## 2022-10-25 PROCEDURE — G8484 FLU IMMUNIZE NO ADMIN: HCPCS | Performed by: ORTHOPAEDIC SURGERY

## 2022-10-25 PROCEDURE — 1036F TOBACCO NON-USER: CPT | Performed by: ORTHOPAEDIC SURGERY

## 2022-10-25 PROCEDURE — G8417 CALC BMI ABV UP PARAM F/U: HCPCS | Performed by: ORTHOPAEDIC SURGERY

## 2022-10-25 NOTE — PROGRESS NOTES
Patient ID: Diana Monreal is a 81 y.o. female    Chief Compliant:  Chief Complaint   Patient presents with    Back Problem     Low back pain        Diagnostic imaging:    Ct CT myelogram from January again reviewed patient with moderate lumbar spinal stenosis L3-4 history of L1-2 PLIF L4 to sacrum decompression fusion also with a nerve stimulator that patient reports is not working    Bone scan reveals no signs of fracture patient with significant uptake at her L3-4 facet joints     Assessment and Plan:  1. Degenerative disc disease, cervical    2. Post laminectomy syndrome    3. Acquired spondylolisthesis      Patient not a candidate for surgery for a year due to cardiac stents    Moderately severe lumbar spinal stenosis L3-4    Low back pain with bi-radicular hip pain, right worse than left    Continue following with pain management    Follow up 10 weeks      Patient will likely become a candidate for an L3-4 decompression with removal of her nerve root stimulator      HPI:  This is a 81 y.o. female who presents to the clinic today for bone scan results. Patient states that she has hip pain, right worse than left. She notes it is worsened with sitting for extended periods of time. She states that her pain is worsened when standing or walking. Patient is ambulating with assistance via roll-aid walker. She states some improvement to pain following pain management for LESI lasting 2 weeks. Patient notes this pain will be in her bilateral low back above her tail bone and has difficulty when walking initially due to the low back pain. Patient notes having neck pain so she received JUAN RAMON with limited relief, less than 2 weeks relief. She states her SCS is not working so she is discussing getting it removed. She notes her left leg extending until they shut it off, then they tried again causing her right leg to extend and shut off her stimulator again and left the stimulator off.     Review of Systems   All other systems reviewed and are negative. Past History:    Current Outpatient Medications:     levoFLOXacin (LEVAQUIN) 250 MG tablet, Take 1 tablet by mouth daily for 10 days, Disp: 10 tablet, Rfl: 0    nitrofurantoin, macrocrystal-monohydrate, (MACROBID) 100 MG capsule, TAKE 1 CAPSULE BY MOUTH EVERY DAY, Disp: 30 capsule, Rfl: 5    clopidogrel (PLAVIX) 75 MG tablet, Take 75 mg by mouth daily, Disp: , Rfl:     nitroGLYCERIN (NITROSTAT) 0.4 MG SL tablet, 1 under the tongue as needed for angina, may repeat q5mins for up three doses, Disp: , Rfl:     RECTIV 0.4 % rectal ointment, , Disp: , Rfl:     metoprolol succinate (TOPROL XL) 25 MG extended release tablet, Take 12.5 mg by mouth daily, Disp: , Rfl:     lidocaine (XYLOCAINE) 5 % ointment, , Disp: , Rfl:     irbesartan (AVAPRO) 75 MG tablet, TAKE 1 TABLET BY MOUTH EVERY DAY, Disp: 30 tablet, Rfl: 3    LIVALO 2 MG TABS tablet, TAKE 1 TABLET BY MOUTH IN THE EVENING, Disp: 30 tablet, Rfl: 5    acetaminophen (TYLENOL) 500 MG tablet, Take 2 tablets by mouth every 6 hours as needed for Pain, Disp: 60 tablet, Rfl: 0    primidone (MYSOLINE) 50 MG tablet, Take 50 mg by mouth 2 times daily , Disp: , Rfl:     D-Mannose 350 MG CAPS, Take 300 mg by mouth daily, Disp: 30 capsule, Rfl: 3    nystatin-triamcinolone (MYCOLOG II) 169397-5.1 UNIT/GM-% cream, Apply topically 4 times daily Apply topically 4 times daily. , Disp: 30 g, Rfl: 3    lubiprostone (AMITIZA) 8 MCG CAPS capsule, Take 1 capsule by mouth daily, Disp: 30 capsule, Rfl: 1    lidocaine (XYLOCAINE) 2 % jelly, APPLY TOPICALLY AS NEEDED, Disp: , Rfl:     albuterol (PROVENTIL) (5 MG/ML) 0.5% nebulizer solution, Inhale 0.5 mLs into the lungs as needed , Disp: , Rfl:     nystatin (MYCOSTATIN) 313458 UNIT/GM powder, Apply 3 times daily. , Disp: 45 g, Rfl: 3    esomeprazole (NEXIUM) 40 MG delayed release capsule, Take 1 capsule by mouth every morning (before breakfast), Disp: 90 capsule, Rfl: 3    hydrocortisone (ANUSOL-HC) 2.5 % CREA rectal cream, Place rectally 2 times daily, Disp: 28 g, Rfl: 3    vitamin B-12 (CYANOCOBALAMIN) 1000 MCG tablet, Take 1 tablet by mouth once a week, Disp: 30 tablet, Rfl: 3    aspirin 81 MG EC tablet, Take 1 tablet by mouth 2 times daily, Disp: 30 tablet, Rfl: 3    rOPINIRole (REQUIP) 4 MG tablet, Take by mouth 3 times daily , Disp: , Rfl:     glipiZIDE (GLUCOTROL) 5 MG tablet, 5mg in the am and 2.5 mg in the pm, Disp: , Rfl:     Continuous Blood Gluc Sensor (FREESTYLE DAVIS 14 DAY SENSOR) MISC, , Disp: , Rfl:     Multiple Vitamins-Minerals (THERAPEUTIC MULTIVITAMIN-MINERALS) tablet, Take 1 tablet by mouth daily , Disp: , Rfl:     Multiple Vitamins-Minerals (PRESERVISION AREDS PO), Take by mouth daily , Disp: , Rfl:     Continuous Blood Gluc  (FREESTYLE DAVIS 14 DAY READER) MATTHEW, , Disp: , Rfl:     CRANBERRY PO, Take by mouth 2 times daily, Disp: , Rfl:   Allergies   Allergen Reactions    Iodides Anaphylaxis, Hives and Itching     \"Contrast dyes\"  This was added by someone but Patient states has had IVP dyes multiple times without problems and had a myelogram in Dec 2016 without problems    Povidone-Iodine Anaphylaxis, Hives and Swelling    Sulfa Antibiotics Hives and Swelling     Other reaction(s): Unknown  Other reaction(s):  Intolerance-unknown  Hands, feet, mouth, eyes  Other reaction(s): Unknown    Betadine [Povidone Iodine] Hives    Cephalexin Hives and Swelling    Cephalexin Itching     Other reaction(s): Hallucinations  In 1969 received demerol and keflex together so was told to list both as allergies    Cozaar [Losartan] Nausea Only     Pt also gets sores on toungue    Cymbalta [Duloxetine Hcl] Diarrhea and Nausea And Vomiting     Weakness    Demerol Hives and Swelling    Doxycycline Other (See Comments)     Sore mouth        Meperidine Itching     Other reaction(s): Hallucinations      Morphine Hives and Itching    Penicillins Hives, Swelling and Itching     Other reaction(s): Intolerance-unknown  Other reaction(s): Unknown    Zithromax [Azithromycin] Other (See Comments)     Sore mouth      Ciprofloxacin      Pt stated it gave her canker sores    Clindamycin/Lincomycin     Etodolac     Fluorescein     Iodine      Other reaction(s): Intolerance-unknown    Lisinopril      cough    Penicillin G     Soap     Toviaz [Fesoterodine Fumarate Er]     Clonazepam Other (See Comments)     From neuro: made her too sleepy    Metformin And Related Nausea And Vomiting     Diarrhea and N/V     Social History     Socioeconomic History    Marital status:      Spouse name: Not on file    Number of children: Not on file    Years of education: Not on file    Highest education level: Not on file   Occupational History    Occupation: retired   Tobacco Use    Smoking status: Former     Packs/day: 0.50     Years: 30.00     Pack years: 15.00     Types: Cigarettes     Quit date: 1982     Years since quittin.4    Smokeless tobacco: Former     Quit date: 1982   Vaping Use    Vaping Use: Never used   Substance and Sexual Activity    Alcohol use: No    Drug use: No    Sexual activity: Not Currently   Other Topics Concern    Not on file   Social History Narrative    Not on file     Social Determinants of Health     Financial Resource Strain: Low Risk     Difficulty of Paying Living Expenses: Not hard at all   Food Insecurity: No Food Insecurity    Worried About Running Out of Food in the Last Year: Never true    Ran Out of Food in the Last Year: Never true   Transportation Needs: Not on file   Physical Activity: Not on file   Stress: Not on file   Social Connections: Not on file   Intimate Partner Violence: Not on file   Housing Stability: Not on file     Past Medical History:   Diagnosis Date    Age-related cognitive decline     Ankle pain     Left ankle fracture in ortho boat.     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[facility] [address]     left with urinary incontinenc post surgical    Caffeine use     2 coffee/day, 1-2 tea per week    Chronic back pain     Chronic ITP (idiopathic thrombocytopenia) (HCC)     CVA (cerebral infarction) 2008, 2010    balance issues, short term memory loss    Diabetic gastroparesis ([facility] [address]     Diverticular disease 1986    GERD (gastroesophageal reflux disease)     Hiatal hernia     Hip fracture (HCC)     History of blood transfusion     History of decreased platelet count     Hyperlipemia     Hypertension     Internal hemorrhoid     Macular degeneration of left eye 1980's    S/P laser treatment    Nausea and vomiting     Neuropathy     Osteoarthritis     Ringing in ears     Self-catheterizes urinary bladder     6-7 times daily    TIA (transient ischemic attack) 2000    x1    Tubular adenoma     colon polyps    Type II or unspecified type diabetes mellitus without mention of complication, not stated as uncontrolled     Urinary incontinence     frequent UTI s/p infection, surgical dilatation lead to incontinence    Wears dentures     full on top, partial on bottom    Wears glasses      Past Surgical History:   Procedure Laterality Date    APPENDECTOMY  1962    BLADDER SURGERY      bladder stimulator    BLADDER SUSPENSION  2002    multiple    CARDIAC CATHETERIZATION  2008    no stents    CARDIOVASCULAR STRESS TEST  12/2014    CATARACT REMOVAL WITH IMPLANT Left 11/07/2017    Raffoul/Sulaiman    CATARACT REMOVAL WITH IMPLANT Right 11/28/2017    Raffoul/Sulaiman    COLON SURGERY      colostomy reversal    COLONOSCOPY  04/07/2016    tubular adenoma x3; internal hemorrhoids    COLONOSCOPY N/A 11/06/2019    COLONOSCOPY DIAGNOSTIC performed by Korey Nelson MD at . [facility]  11/06/2019    COLOSTOMY  1986    bowel obstruction/ rupture from diverticular disease, reversal 3 mos later    CORONARY ANGIOPLASTY WITH STENT PLACEMENT  08/04/2022    CYSTOSCOPY  09/08/2017    W/ 200IU Botox     FRACTURE SURGERY Right 2011    hip FRACTURE SURGERY Left     WRIST    FRACTURE SURGERY Left 2013    ankle    HERNIA REPAIR      HYSTERECTOMY (CERVIX STATUS UNKNOWN)  1969    JOINT REPLACEMENT Bilateral 2000    BILAT KNEES    LUMBAR FUSION  2017    L2/L3    IN XCAPSL CTRC RMVL INSJ IO LENS PROSTH W/O ECP Left 2017    EYE CATARACT EMULSIFICATION IOL IMPLANT performed by Dave Aponte MD at [address] W/O ECP Right 2017    EYE CATARACT EMULSIFICATION IOL IMPLANT performed by Dave Aponte MD at [address] Right 10/27/2015    WITH POLY EXCHANGE BIOMET AND GPS APPLICATION    REVISION TOTAL KNEE ARTHROPLASTY Left 2020    KNEE TOTAL ARTHROPLASTY REVISION - POLY EXCHANGE performed by Nahomy Mcnamara MD at [address] [facility]  2010    fusion, did not take    [facility]    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      TONSILLECTOMY      UPPER GASTROINTESTINAL ENDOSCOPY      UPPER GASTROINTESTINAL ENDOSCOPY  2014    UPPER GASTROINTESTINAL ENDOSCOPY  2016    mild gastritis    UPPER GASTROINTESTINAL ENDOSCOPY N/A 2018    retained thick secretions in proximal esophagus     Family History   Problem Relation Age of Onset    Breast Cancer Mother     Cancer Mother         breast and uterine  39    Heart Disease Father     Heart Attack Father          28    Cancer Maternal Grandmother     Cancer Brother 46        bronchogenic adenocarcinoma cancer    Lung Cancer Brother     Cancer Sister         lung    Lung Cancer Sister          72    Breast Cancer Sister          62    Cancer Sister         breast        Physical Exam:  Vitals signs and nursing note reviewed. Constitutional:       Appearance: well-developed. HENT:      Head: Normocephalic and atraumatic.       Nose: Nose normal.   Eyes:      Conjunctiva/sclera: Conjunctivae normal. Neck:      Musculoskeletal: Normal range of motion and neck supple. Pulmonary:      Effort: Pulmonary effort is normal. No respiratory distress. Musculoskeletal:      Comments: Normal gait     Skin:     General: Skin is warm and dry. Neurological:      Mental Status: Alert and oriented to person, place, and time. Sensory: No sensory deficit. Psychiatric:         Behavior: Behavior normal.         Thought Content: Thought content normal.        Provider Attestation:  Derrek Bonner, personally performed the services described in this documentation. All medical record entries made by the scribe were at my direction and in my presence. I have reviewed the chart and discharge instructions and agree that the records reflect my personal performance and is accurate and complete. Anand Wilson MD 10/25/22       Scribe Attestation:  By signing my name below, Laquita Melendez, attest that this documentation has been prepared under the direction and in the presence of Dr. Gilbert Sheppard. Electronically signed: Cary Salgado, 10/25/22     Please note that this chart was generated using voice recognition Dragon dictation software. Although every effort was made to ensure the accuracy of this automated transcription, some errors in transcription may have occurred.

## 2022-12-03 DIAGNOSIS — B35.4 TINEA CORPORIS: ICD-10-CM

## 2022-12-03 DIAGNOSIS — R21 RASH: ICD-10-CM

## 2022-12-05 RX ORDER — NYSTATIN 100000 [USP'U]/G
POWDER TOPICAL
Qty: 45 G | Refills: 0 | OUTPATIENT
Start: 2022-12-05

## 2022-12-06 DIAGNOSIS — G25.0 ESSENTIAL TREMOR: ICD-10-CM

## 2022-12-06 LAB
T3 FREE: 2.77 PG/ML (ref 2.02–4.43)
THYROXINE, FREE: 1.04 NG/DL (ref 0.93–1.7)
TSH SERPL DL<=0.05 MIU/L-ACNC: 2.07 UIU/ML (ref 0.3–5)

## 2022-12-08 ENCOUNTER — OFFICE VISIT (OUTPATIENT)
Dept: NEUROLOGY | Age: 82
End: 2022-12-08
Payer: MEDICARE

## 2022-12-08 VITALS
SYSTOLIC BLOOD PRESSURE: 123 MMHG | HEART RATE: 54 BPM | BODY MASS INDEX: 31.93 KG/M2 | DIASTOLIC BLOOD PRESSURE: 59 MMHG | HEIGHT: 57 IN | WEIGHT: 148 LBS

## 2022-12-08 DIAGNOSIS — M54.2 CHRONIC NECK PAIN: ICD-10-CM

## 2022-12-08 DIAGNOSIS — M54.40 CHRONIC LOW BACK PAIN WITH SCIATICA, SCIATICA LATERALITY UNSPECIFIED, UNSPECIFIED BACK PAIN LATERALITY: ICD-10-CM

## 2022-12-08 DIAGNOSIS — G25.0 ESSENTIAL TREMOR: Primary | ICD-10-CM

## 2022-12-08 DIAGNOSIS — G89.29 CHRONIC NECK PAIN: ICD-10-CM

## 2022-12-08 DIAGNOSIS — G89.29 CHRONIC LOW BACK PAIN WITH SCIATICA, SCIATICA LATERALITY UNSPECIFIED, UNSPECIFIED BACK PAIN LATERALITY: ICD-10-CM

## 2022-12-08 PROCEDURE — 1123F ACP DISCUSS/DSCN MKR DOCD: CPT | Performed by: PSYCHIATRY & NEUROLOGY

## 2022-12-08 PROCEDURE — 3074F SYST BP LT 130 MM HG: CPT | Performed by: PSYCHIATRY & NEUROLOGY

## 2022-12-08 PROCEDURE — 99214 OFFICE O/P EST MOD 30 MIN: CPT | Performed by: PSYCHIATRY & NEUROLOGY

## 2022-12-08 PROCEDURE — G8400 PT W/DXA NO RESULTS DOC: HCPCS | Performed by: PSYCHIATRY & NEUROLOGY

## 2022-12-08 PROCEDURE — G8417 CALC BMI ABV UP PARAM F/U: HCPCS | Performed by: PSYCHIATRY & NEUROLOGY

## 2022-12-08 PROCEDURE — G8427 DOCREV CUR MEDS BY ELIG CLIN: HCPCS | Performed by: PSYCHIATRY & NEUROLOGY

## 2022-12-08 PROCEDURE — 3078F DIAST BP <80 MM HG: CPT | Performed by: PSYCHIATRY & NEUROLOGY

## 2022-12-08 PROCEDURE — G8484 FLU IMMUNIZE NO ADMIN: HCPCS | Performed by: PSYCHIATRY & NEUROLOGY

## 2022-12-08 PROCEDURE — 1036F TOBACCO NON-USER: CPT | Performed by: PSYCHIATRY & NEUROLOGY

## 2022-12-08 PROCEDURE — 1090F PRES/ABSN URINE INCON ASSESS: CPT | Performed by: PSYCHIATRY & NEUROLOGY

## 2022-12-08 RX ORDER — PRIMIDONE 50 MG/1
50 TABLET ORAL 2 TIMES DAILY
Qty: 180 TABLET | Refills: 3 | Status: SHIPPED | OUTPATIENT
Start: 2022-12-08

## 2022-12-08 ASSESSMENT — ENCOUNTER SYMPTOMS
GASTROINTESTINAL NEGATIVE: 1
RESPIRATORY NEGATIVE: 1
BACK PAIN: 1
ALLERGIC/IMMUNOLOGIC NEGATIVE: 1
EYES NEGATIVE: 1

## 2022-12-08 NOTE — PROGRESS NOTES
Active problem Patient has essential tremor on mysoline undergoing Head CT and thyroid functions . The condition is she report that tremor has improved on mysoline by about 50 % . There is residual tremor which is not bothersome such as in Sikhism when holding bible . She is tolerating mysoline well on 50 mg po bid reporting overall tremor is is tolerable wanting to stay on current regimen . She is ambulating with walker having chronic low back and neck pain. with diabetic neuropathy . 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 There is chronic mid low back pain of aching component of grade 6 over 10 . There is mid neck pain grade 8 to 10 over 10 . For pain she will use tylenol ES having upcoming appointment with pain clinic . She has diabetic neuropathy with numbness to mid leg with some stabbing pain . Neurontin puts her to sleep . She is getting intermittent injections to neck and low back . Her walking is slow and wobbly using walker . There are some restless legs when sedentary on requip 4 mg po tid. Significant medications mysoline 50 mg po bid , requip 4 mg po tid . Testing lumbar CT myelogram post surical cahnges lumbar spine . T12 -L2 laminctomy posterior spinal fusion instrumentation on the right at L1-L2 with inter disc spacer identified. There is 4-5 mm retrolisthesis of L1 on 2. Laminectomy L4-S1 . Left low back electrical stimulator device . Focal clumping cauda equina nerve roots L3-L4 . CT cervical post mylogram multilevel cervical spondylosis resulting in upwards of moderate canal stenosis at C5-6 with abutment of the underlying cord , January 2022  . Head CT mild atrophy . Old right basal ganglia lacune with mild chronic periventricular small vessel ischemia .  T3 , T4 ,TSH normal     Past Medical History:   Diagnosis Date    Age-related cognitive decline Ankle pain     Left ankle fracture in ortho boat.     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    left with urinary incontinenc post surgical    Caffeine use     2 coffee/day, 1-2 tea per week    Chronic back pain     Chronic ITP (idiopathic thrombocytopenia) (HCC)     CVA (cerebral infarction) 2008, 2010    balance issues, short term memory loss    Diabetic gastroparesis ([address]     Diverticular disease 1986    GERD (gastroesophageal reflux disease)     Hiatal hernia     Hip fracture (HCC)     History of blood transfusion     History of decreased platelet count     Hyperlipemia     Hypertension     Internal hemorrhoid     Macular degeneration of left eye 1980's    S/P laser treatment    Nausea and vomiting     Neuropathy     Osteoarthritis     Ringing in ears     Self-catheterizes urinary bladder     6-7 times daily    TIA (transient ischemic attack) 2000    x1    Tubular adenoma     colon polyps    Type II or unspecified type diabetes mellitus without mention of complication, not stated as uncontrolled     Urinary incontinence     frequent UTI s/p infection, surgical dilatation lead to incontinence    Wears dentures     full on top, partial on bottom    Wears glasses        Past Surgical History:   Procedure Laterality Date    APPENDECTOMY  1962    BLADDER SURGERY      bladder stimulator    BLADDER SUSPENSION  2002    multiple    CARDIAC CATHETERIZATION  2008    no stents    CARDIOVASCULAR STRESS TEST  12/2014    CATARACT REMOVAL WITH IMPLANT Left 11/07/2017    [facility]    CATARACT REMOVAL WITH IMPLANT Right 11/28/2017    [facility]    COLON SURGERY      colostomy reversal    COLONOSCOPY  04/07/2016    tubular adenoma x3; internal hemorrhoids    COLONOSCOPY N/A 11/06/2019    COLONOSCOPY DIAGNOSTIC performed by Hunter Castro MD at . [facility]  11/06/2019    COLOSTOMY  1986    bowel obstruction/ rupture from diverticular disease, reversal 3 mos later    CORONARY ANGIOPLASTY WITH STENT PLACEMENT  2022    CYSTOSCOPY  2017    W/ 200IU Botox     FRACTURE SURGERY Right 2011    hip    FRACTURE SURGERY Left     WRIST    FRACTURE SURGERY Left 2013    ankle    HERNIA REPAIR      HYSTERECTOMY (CERVIX STATUS UNKNOWN)  1969    JOINT REPLACEMENT Bilateral 2000    BILAT KNEES    LUMBAR FUSION  2017    L2/L3    AR XCAPSL CTRC RMVL INSJ IO LENS PROSTH W/O ECP Left 2017    EYE CATARACT EMULSIFICATION IOL IMPLANT performed by Faith Del Angel MD at [address] W/O ECP Right 2017    EYE CATARACT EMULSIFICATION IOL IMPLANT performed by Faith Del Angel MD at [address] Right 10/27/2015    WITH POLY EXCHANGE BIOMET AND GPS APPLICATION    REVISION TOTAL KNEE ARTHROPLASTY Left 2020    KNEE TOTAL ARTHROPLASTY REVISION - POLY EXCHANGE performed by Lilliana Sutton MD at [address]  2010    fusion, did not take    [address] [place] [facility]    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      TONSILLECTOMY      UPPER GASTROINTESTINAL ENDOSCOPY      UPPER GASTROINTESTINAL ENDOSCOPY  2014    UPPER GASTROINTESTINAL ENDOSCOPY  2016    mild gastritis    UPPER GASTROINTESTINAL ENDOSCOPY N/A 2018    retained thick secretions in proximal esophagus       Family History   Problem Relation Age of Onset    Breast Cancer Mother     Cancer Mother         breast and uterine  39    Heart Disease Father     Heart Attack Father          28    Cancer Maternal Grandmother     Cancer Brother 46        bronchogenic adenocarcinoma cancer    Lung Cancer Brother     Cancer Sister         lung    Lung Cancer Sister          72    Breast Cancer Sister          62    Cancer Sister         breast       Social History     Socioeconomic History    Marital status:      Spouse name: None    Number of children: None    Years of education: None    Highest education level: None   Occupational History    Occupation: retired   Tobacco Use    Smoking status: Former     Packs/day: 0.50     Years: 30.00     Pack years: 15.00     Types: Cigarettes     Quit date: 1982     Years since quittin.5    Smokeless tobacco: Former     Quit date: 1982   Vaping Use    Vaping Use: Never used   Substance and Sexual Activity    Alcohol use: No    Drug use: No    Sexual activity: Not Currently     Social Determinants of Health     Financial Resource Strain: Low Risk     Difficulty of Paying Living Expenses: Not hard at all   Food Insecurity: No Food Insecurity    Worried About Running Out of Food in the Last Year: Never true    Ran Out of Food in the Last Year: Never true       Current Outpatient Medications   Medication Sig Dispense Refill    primidone (MYSOLINE) 50 MG tablet Take 1 tablet by mouth 2 times daily 180 tablet 3    isosorbide mononitrate (IMDUR) 30 MG extended release tablet       estradiol (ESTRACE) 0.1 MG/GM vaginal cream Place 2 g vaginally daily      nitrofurantoin, macrocrystal-monohydrate, (MACROBID) 100 MG capsule TAKE 1 CAPSULE BY MOUTH EVERY DAY 30 capsule 5    clopidogrel (PLAVIX) 75 MG tablet Take 75 mg by mouth daily      nitroGLYCERIN (NITROSTAT) 0.4 MG SL tablet 1 under the tongue as needed for angina, may repeat q5mins for up three doses      RECTIV 0.4 % rectal ointment       metoprolol succinate (TOPROL XL) 25 MG extended release tablet Take 12.5 mg by mouth daily      lidocaine (XYLOCAINE) 5 % ointment       irbesartan (AVAPRO) 75 MG tablet TAKE 1 TABLET BY MOUTH EVERY DAY 30 tablet 3    LIVALO 2 MG TABS tablet TAKE 1 TABLET BY MOUTH IN THE EVENING 30 tablet 5    acetaminophen (TYLENOL) 500 MG tablet Take 2 tablets by mouth every 6 hours as needed for Pain 60 tablet 0    D-Mannose 350 MG CAPS Take 300 mg by mouth daily 30 capsule 3 nystatin-triamcinolone (MYCOLOG II) 196059-3.1 UNIT/GM-% cream Apply topically 4 times daily Apply topically 4 times daily. 30 g 3    lubiprostone (AMITIZA) 8 MCG CAPS capsule Take 1 capsule by mouth daily 30 capsule 1    lidocaine (XYLOCAINE) 2 % jelly APPLY TOPICALLY AS NEEDED      albuterol (PROVENTIL) (5 MG/ML) 0.5% nebulizer solution Inhale 0.5 mLs into the lungs as needed       nystatin (MYCOSTATIN) 542687 UNIT/GM powder Apply 3 times daily. 45 g 3    esomeprazole (NEXIUM) 40 MG delayed release capsule Take 1 capsule by mouth every morning (before breakfast) 90 capsule 3    hydrocortisone (ANUSOL-HC) 2.5 % CREA rectal cream Place rectally 2 times daily 28 g 3    vitamin B-12 (CYANOCOBALAMIN) 1000 MCG tablet Take 1 tablet by mouth once a week 30 tablet 3    aspirin 81 MG EC tablet Take 1 tablet by mouth 2 times daily 30 tablet 3    rOPINIRole (REQUIP) 4 MG tablet Take by mouth 3 times daily       glipiZIDE (GLUCOTROL) 5 MG tablet 5mg in the am and 2.5 mg in the pm      Continuous Blood Gluc Sensor (FREESTYLE DAVIS 14 DAY SENSOR) MISC       Multiple Vitamins-Minerals (THERAPEUTIC MULTIVITAMIN-MINERALS) tablet Take 1 tablet by mouth daily       Multiple Vitamins-Minerals (PRESERVISION AREDS PO) Take by mouth daily       Continuous Blood Gluc  (FREESTYLE DAVIS 14 DAY READER) MATTHEW       CRANBERRY PO Take by mouth 2 times daily       No current facility-administered medications for this visit.      Facility-Administered Medications Ordered in Other Visits   Medication Dose Route Frequency Provider Last Rate Last Admin    0.9 % sodium chloride infusion 250 mL  250 mL IntraVENous Once Haley Bashir MD           Allergies   Allergen Reactions    Iodides Anaphylaxis, Hives and Itching     \"Contrast dyes\"  This was added by someone but Patient states has had IVP dyes multiple times without problems and had a myelogram in Dec 2016 without problems    Povidone-Iodine Anaphylaxis, Hives and Swelling    Sulfa Antibiotics Hives and Swelling     Other reaction(s): Unknown  Other reaction(s): Intolerance-unknown  Hands, feet, mouth, eyes  Other reaction(s): Unknown    Betadine [Povidone Iodine] Hives    Cephalexin Hives and Swelling    Cephalexin Itching     Other reaction(s): Hallucinations  In 1969 received demerol and keflex together so was told to list both as allergies    Cozaar [Losartan] Nausea Only     Pt also gets sores on toungue    Cymbalta [Duloxetine Hcl] Diarrhea and Nausea And Vomiting     Weakness    Demerol Hives and Swelling    Doxycycline Other (See Comments)     Sore mouth        Meperidine Itching     Other reaction(s): Hallucinations      Morphine Hives and Itching    Penicillins Hives, Swelling and Itching     Other reaction(s): Intolerance-unknown  Other reaction(s): Unknown    Zithromax [Azithromycin] Other (See Comments)     Sore mouth      Ciprofloxacin      Pt stated it gave her canker sores    Clindamycin/Lincomycin     Etodolac     Fluorescein     Iodine      Other reaction(s): Intolerance-unknown    Lisinopril      cough    Penicillin G     Soap     Toviaz [Fesoterodine Fumarate Er]     Clonazepam Other (See Comments)     From neuro: made her too sleepy    Metformin And Related Nausea And Vomiting     Diarrhea and N/V         Review of Systems     Vitals:    12/08/22 1014   BP: (!) 123/59   Pulse: 54     weight: 148 lb (67.1 kg)      Review of Systems   Constitutional: Negative. HENT: Negative. Eyes: Negative. Respiratory: Negative. Cardiovascular: Negative. Gastrointestinal: Negative. Endocrine: Negative. Genitourinary: Negative. Musculoskeletal:  Positive for back pain and neck pain. Skin: Negative. Allergic/Immunologic: Negative. Neurological:  Positive for tremors. Hematological: Negative. Psychiatric/Behavioral: Negative. Neurological Examination  Constitutional .General exam well groomed   Head/Ears /Nose/Throat: external ear . Normal exam  Neck and thyroid . Normal size. No bruits  Respiratory . Breathsounds clear bilaterally  Cardiovascular: Auscultation of heart with regular rate and rhythm  Musculoskeletal. Muscle bulk and tone normal                                                           Muscle strength 5/5 strength throughout                                                                                No dysmetria or dysdiadokinesis  Postural tremor with both hands outstretched   Normal fine motor  Gait imbalance   Orientation Alert and oriented x 3   Attention and concentration normal  Short term memory 1 word out of 3 minutes   Language process and speech normal . No aphasia   Cranial nerve 2 normal acuety and visual fields  Cranial nerve 3, 4 and 6 . Extraocular muscles are intact . Pupils are equal and reactive   Cranial nerve 5 . Normal strength of masseter and temporalis . Intact corneal reflex. Normal facial sensation  Cranial nerve 7 normal exam   Cranial nerve 8. Grossly intact hearing   Cranial nerve 9 and 10. Symmetric palate elevation   Cranial nerve 11 , 5 out of 5 strength   Cranial Nerve 12 midline tongue . No atrophy  Sensation . Decreased pinprick and light touch mid lower leg bilaterally   Deep Tendon Reflexes hypoactive with absnet ankle   Plantar response flexor bilaterally      ASSESSMENT/PLAN      Diagnosis Orders   1. Essential tremor        2. Chronic low back pain with sciatica, sciatica laterality unspecified, unspecified back pain laterality        3.  Chronic neck pain          She is to continue current regimen     As above

## 2022-12-23 ENCOUNTER — TELEPHONE (OUTPATIENT)
Dept: INTERVENTIONAL RADIOLOGY/VASCULAR | Age: 82
End: 2022-12-23

## 2022-12-23 ENCOUNTER — OFFICE VISIT (OUTPATIENT)
Dept: ORTHOPEDIC SURGERY | Age: 82
End: 2022-12-23

## 2022-12-23 DIAGNOSIS — M25.559 HIP PAIN: Primary | ICD-10-CM

## 2022-12-23 NOTE — PROGRESS NOTES
Darion Pat M.D.            [facility] [address], [address] [facility]           Dept Phone: [phone]           Dept Fax:  [address], [place]          Dept Phone: [phone]           Dept Fax:  [phone]      Chief Compliant:  Chief Complaint   Patient presents with    Pain     Rt hip        History of Present Illness: This is a 81 y.o. female who presents to the clinic today for evaluation / follow up of right hip pain. Patient is an 16-year-old female with history of bilateral knee arthroplasties 1 being revision she has no complaints regarding her knee. She has recently seen Dr. Tanya Gaines this past October and she has very severe spinal stenosis which is causing her a lot of difficulties however she has had recent cardiac stents and this prevents her from having surgery for greater than a year she has had trochanter injections in the past however the patient is now complaining more of groin pain than anything else. She has pain when arising out of chair. She she indicates the area of her groin where she is hurting. She says she has a little bit of tenderness on the lateral side but she sleeps on her left side and does better with this. She does have radicular symptoms which is obviously relationship to her low back problems. .       Review of Systems   Constitutional: Negative for fever, chills, sweats. Eyes: Negative for changes in vision, or pain. HENT: Negative for ear ache, epistaxis, or sore throat. Respiratory/Cardio: Negative for Chest pain, palpitations, SOB, or cough. Gastrointestinal: Negative for abdominal pain, N/V/D. Genitourinary: Negative for dysuria, frequency, urgency, or hematuria. Neurological: Negative for headache, numbness, or weakness.    Integumentary: Negative for rash, itching, laceration, or abrasion. Musculoskeletal: Positive for Pain (Rt hip)       Physical Exam:  Constitutional: Patient is oriented to person, place, and time. Patient appears well-developed and well nourished. HENT: Negative otherwise noted  Head: Normocephalic and Atraumatic  Nose: Normal  Eyes: Conjunctivae and EOM are normal  Neck: Normal range of motion Neck supple. Respiratory/Cardio: Effort normal. No respiratory distress. Musculoskeletal: Examination notes the patient does have discomfort on flexion internal extra rotation. She really has very minimal trochanteric tenderness however today. No other contributory findings    Neurological: Patient is alert and oriented to person, place, and time. Normal strength. No sensory deficit. Skin: Skin is warm and dry  Psychiatric: Behavior is normal. Thought content normal.  Nursing note and vitals reviewed. Labs and Imaging:     XR taken today:  XR PELVIS (1-2 VIEWS)    Result Date: 12/23/2022  X-rays taken today reviewed by me showed AP of the pelvis. Patient has previous percutaneous pinning of a right femoral neck fracture. The appears to be no obvious change with these and she does have some degenerative changes of the femoral acetabular joint with joint space narrowing and slight spur formation. There is no evidence of avascular porosis or subchondral collapse. Patient's left hip is relatively unremarkable. Spinal cord stimulator is visualized in the left hemipelvis area. No other acute or chronic process is noted.           Orders Placed This Encounter   Procedures    XR PELVIS (1-2 VIEWS)     Standing Status:   Future     Number of Occurrences:   1     Standing Expiration Date:   12/23/2023    IR ARTHR/ASP/INJ MAJOR JT/BURSA RIGHT WO US     INTRA ARTICULAR INJECTION PREFERRED PROTOCOL       FOR  ____right hip______   INJECTION:      4 cc Xylocaine, 1% plain  4 cc Marcaine, 0.5%  1 cc Depomedrol 80 mgm/cc OR          Celestone 6 mgm/cc Standing Status:   Future     Standing Expiration Date:   12/23/2023       Assessment and Plan:  Status post bilateral total knee arthroplasties  Status post percutaneous pinning right hip with secondary degenerative joint disease  History of severe spinal stenosis with spinal cord stimulator failure. Surgery pending due to cardiac stent status        This is a 81 y.o. 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 Past History:    Current Outpatient Medications:     primidone (MYSOLINE) 50 MG tablet, Take 1 tablet by mouth 2 times daily, Disp: 180 tablet, Rfl: 3    isosorbide mononitrate (IMDUR) 30 MG extended release tablet, , Disp: , Rfl:     estradiol (ESTRACE) 0.1 MG/GM vaginal cream, Place 2 g vaginally daily, Disp: , Rfl:     nitrofurantoin, macrocrystal-monohydrate, (MACROBID) 100 MG capsule, TAKE 1 CAPSULE BY MOUTH EVERY DAY, Disp: 30 capsule, Rfl: 5    clopidogrel (PLAVIX) 75 MG tablet, Take 75 mg by mouth daily, Disp: , Rfl:     nitroGLYCERIN (NITROSTAT) 0.4 MG SL tablet, 1 under the tongue as needed for angina, may repeat q5mins for up three doses, Disp: , Rfl:     RECTIV 0.4 % rectal ointment, , Disp: , Rfl:     metoprolol succinate (TOPROL XL) 25 MG extended release tablet, Take 12.5 mg by mouth daily, Disp: , Rfl:     lidocaine (XYLOCAINE) 5 % ointment, , Disp: , Rfl:     irbesartan (AVAPRO) 75 MG tablet, TAKE 1 TABLET BY MOUTH EVERY DAY, Disp: 30 tablet, Rfl: 3    LIVALO 2 MG TABS tablet, TAKE 1 TABLET BY MOUTH IN THE EVENING, Disp: 30 tablet, Rfl: 5    acetaminophen (TYLENOL) 500 MG tablet, Take 2 tablets by mouth every 6 hours as needed for Pain, Disp: 60 tablet, Rfl: 0    D-Mannose 350 MG CAPS, Take 300 mg by mouth daily, Disp: 30 capsule, Rfl: 3    nystatin-triamcinolone (MYCOLOG II) 156803-8.1 UNIT/GM-% cream, Apply topically 4 times daily Apply topically 4 times daily. , Disp: 30 g, Rfl: 3    lubiprostone (AMITIZA) 8 MCG CAPS capsule, Take 1 capsule by mouth daily, Disp: 30 capsule, Rfl: 1    lidocaine (XYLOCAINE) 2 % jelly, APPLY TOPICALLY AS NEEDED, Disp: , Rfl:     albuterol (PROVENTIL) (5 MG/ML) 0.5% nebulizer solution, Inhale 0.5 mLs into the lungs as needed , Disp: , Rfl:     nystatin (MYCOSTATIN) 607354 UNIT/GM powder, Apply 3 times daily. , Disp: 45 g, Rfl: 3    esomeprazole (NEXIUM) 40 MG delayed release capsule, Take 1 capsule by mouth every morning (before breakfast), Disp: 90 capsule, Rfl: 3    hydrocortisone (ANUSOL-HC) 2.5 % CREA rectal cream, Place rectally 2 times daily, Disp: 28 g, Rfl: 3    vitamin B-12 (CYANOCOBALAMIN) 1000 MCG tablet, Take 1 tablet by mouth once a week, Disp: 30 tablet, Rfl: 3    aspirin 81 MG EC tablet, Take 1 tablet by mouth 2 times daily, Disp: 30 tablet, Rfl: 3    rOPINIRole (REQUIP) 4 MG tablet, Take by mouth 3 times daily , Disp: , Rfl:     glipiZIDE (GLUCOTROL) 5 MG tablet, 5mg in the am and 2.5 mg in the pm, Disp: , Rfl:     Continuous Blood Gluc Sensor (FREESTYLE DAVIS 14 DAY SENSOR) MISC, , Disp: , Rfl:     Multiple Vitamins-Minerals (THERAPEUTIC MULTIVITAMIN-MINERALS) tablet, Take 1 tablet by mouth daily , Disp: , Rfl:     Multiple Vitamins-Minerals (PRESERVISION AREDS PO), Take by mouth daily , Disp: , Rfl:     Continuous Blood Gluc  (FREESTYLE DAVIS 14 DAY READER) MATTHEW, , Disp: , Rfl:     CRANBERRY PO, Take by mouth 2 times daily, Disp: , Rfl:   Allergies   Allergen Reactions    Iodides Anaphylaxis, Hives and Itching     \"Contrast dyes\"  This was added by someone but Patient states has had IVP dyes multiple times without problems and had a myelogram in Dec 2016 without problems    Povidone-Iodine Anaphylaxis, Hives and Swelling    Sulfa Antibiotics Hives and Swelling     Other reaction(s): Unknown  Other reaction(s):  Intolerance-unknown  Hands, feet, mouth, eyes  Other reaction(s): Unknown    Betadine [Povidone Iodine] Hives    Cephalexin Hives and Swelling Cephalexin Itching     Other reaction(s): Hallucinations  In  received demerol and keflex together so was told to list both as allergies    Cozaar [Losartan] Nausea Only     Pt also gets sores on toungue    Cymbalta [Duloxetine Hcl] Diarrhea and Nausea And Vomiting     Weakness    Demerol Hives and Swelling    Doxycycline Other (See Comments)     Sore mouth        Meperidine Itching     Other reaction(s): Hallucinations      Morphine Hives and Itching    Penicillins Hives, Swelling and Itching     Other reaction(s): Intolerance-unknown  Other reaction(s): Unknown    Zithromax [Azithromycin] Other (See Comments)     Sore mouth      Ciprofloxacin      Pt stated it gave her canker sores    Clindamycin/Lincomycin     Etodolac     Fluorescein     Iodine      Other reaction(s):  Intolerance-unknown    Lisinopril      cough    Penicillin G     Soap     Toviaz [Fesoterodine Fumarate Er]     Clonazepam Other (See Comments)     From neuro: made her too sleepy    Metformin And Related Nausea And Vomiting     Diarrhea and N/V     Social History     Socioeconomic History    Marital status:      Spouse name: Not on file    Number of children: Not on file    Years of education: Not on file    Highest education level: Not on file   Occupational History    Occupation: retired   Tobacco Use    Smoking status: Former     Packs/day: 0.50     Years: 30.00     Pack years: 15.00     Types: Cigarettes     Quit date: 1982     Years since quittin.5    Smokeless tobacco: Former     Quit date: 1982   Vaping Use    Vaping Use: Never used   Substance and Sexual Activity    Alcohol use: No    Drug use: No    Sexual activity: Not Currently   Other Topics Concern    Not on file   Social History Narrative    Not on file     Social Determinants of Health     Financial Resource Strain: Low Risk     Difficulty of Paying Living Expenses: Not hard at all   Food Insecurity: No Food Insecurity    Worried About [facility] in the Last Year: Never true    Ran Out of Food in the Last Year: Never true   Transportation Needs: Not on file   Physical Activity: Not on file   Stress: Not on file   Social Connections: Not on file   Intimate Partner Violence: Not on file   Housing Stability: Not on file     Past Medical History:   Diagnosis Date    Age-related cognitive decline     Ankle pain     Left ankle fracture in ortho boat.     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    left with urinary incontinenc post surgical    Caffeine use     2 coffee/day, 1-2 tea per week    Chronic back pain     Chronic ITP (idiopathic thrombocytopenia) (HCC)     CVA (cerebral infarction) 2008, 2010    balance issues, short term memory loss    Diabetic gastroparesis ([address]     Diverticular disease 1986    GERD (gastroesophageal reflux disease)     Hiatal hernia     Hip fracture (HCC)     History of blood transfusion     History of decreased platelet count     Hyperlipemia     Hypertension     Internal hemorrhoid     Macular degeneration of left eye 1980's    S/P laser treatment    Nausea and vomiting     Neuropathy     Osteoarthritis     Ringing in ears     Self-catheterizes urinary bladder     6-7 times daily    TIA (transient ischemic attack) 2000    x1    Tubular adenoma     colon polyps    Type II or unspecified type diabetes mellitus without mention of complication, not stated as uncontrolled     Urinary incontinence     frequent UTI s/p infection, surgical dilatation lead to incontinence    Wears dentures     full on top, partial on bottom    Wears glasses      Past Surgical History:   Procedure Laterality Date    APPENDECTOMY  1962    BLADDER SURGERY      bladder stimulator    BLADDER SUSPENSION  2002    multiple    CARDIAC CATHETERIZATION  2008    no stents    CARDIOVASCULAR STRESS TEST  12/2014    CATARACT REMOVAL WITH IMPLANT Left 11/07/2017    Tyree/Sulaiman    CATARACT REMOVAL WITH IMPLANT Right 2017    Tyree/Sulaiman    COLON SURGERY      colostomy reversal    COLONOSCOPY  2016    tubular adenoma x3; internal hemorrhoids    COLONOSCOPY N/A 2019    COLONOSCOPY DIAGNOSTIC performed by Eloy Alvarado MD at [address]  2019    COLOSTOMY  1986    bowel obstruction/ rupture from diverticular disease, reversal 3 mos later    CORONARY ANGIOPLASTY WITH STENT PLACEMENT  2022    CYSTOSCOPY  2017    W/ 200IU Botox     FRACTURE SURGERY Right     hip    FRACTURE SURGERY Left     WRIST    FRACTURE SURGERY Left     ankle    HERNIA REPAIR      HYSTERECTOMY (CERVIX STATUS UNKNOWN)  [address] Bilateral 2000    BILAT KNEES    LUMBAR FUSION  2017    L2/L3    ID XCAPSL CTRC RMVL INSJ IO LENS PROSTH W/O ECP Left 2017    EYE CATARACT EMULSIFICATION IOL IMPLANT performed by Jose Carr MD at [address] W/O ECP Right 2017    EYE CATARACT EMULSIFICATION IOL IMPLANT performed by Jose Carr MD at [address] Right 10/27/2015    WITH POLY EXCHANGE BIOMET AND GPS APPLICATION    REVISION TOTAL KNEE ARTHROPLASTY Left 2020    KNEE TOTAL ARTHROPLASTY REVISION - POLY EXCHANGE performed by James Martines MD at [address]      fusion, did not take    [address]    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      TONSILLECTOMY      UPPER GASTROINTESTINAL ENDOSCOPY      UPPER GASTROINTESTINAL ENDOSCOPY  2014    UPPER GASTROINTESTINAL ENDOSCOPY  2016    mild gastritis    UPPER GASTROINTESTINAL ENDOSCOPY N/A 2018    retained thick secretions in proximal esophagus     Family History   Problem Relation Age of Onset    Breast Cancer Mother     Cancer Mother         breast and uterine  39    Heart Disease Father     Heart Attack Father          28    Cancer Maternal Grandmother     Cancer Brother 46        bronchogenic adenocarcinoma cancer    Lung Cancer Brother     Cancer Sister         lung    Lung Cancer Sister          72    Breast Cancer Sister          62    Cancer Sister         breast   Plan  Informed the patient that she does not have a trochanter problem appears to be having some pain with guards to her right hip with arthritis secondary to previous percutaneous pinning. As such I feel that she would benefit from interventional radiology injection to her right hip and she is agreeable to this. We will get her set up accordingly. She does have a follow-up with Dr. Christina Auguste in sometime in the near future and she has also planning on having the spinal cord stimulator removed. Provider Attestation:  Shiloh Persaud, personally performed the services described in this documentation. All medical record entries made by the scribe were at my direction and in my presence. I have reviewed the chart and discharge instructions and agree that the records reflect my personal performance and is accurate and complete. Ranjeet Umaña MD. 22      Please note that this chart was generated using voice recognition Dragon dictation software. Although every effort was made to ensure the accuracy of this automated transcription, some errors in transcription may have occurred.

## 2023-01-03 ENCOUNTER — TELEPHONE (OUTPATIENT)
Dept: PRIMARY CARE CLINIC | Age: 83
End: 2023-01-03

## 2023-01-03 NOTE — TELEPHONE ENCOUNTER
Those sx are not influenza. Could be GI flu or covid or other illness  Push fluids. Take otc anti diarrhea meds as needed.

## 2023-01-03 NOTE — TELEPHONE ENCOUNTER
Patient called office c/o possible influenza. Patient states sx started the day after alek. Patient c/o diarrhea, vomiting, loss of appetite, weak, fatigue. Patient states she has been pushing fluids, eating very light Patient states she has tried pepcid, omperazole OTC. Patient states she had to cancel appt she had scheduled yesterday and today.      Patient states she is too sick to come into the office    Please advise     Ann Stout

## 2023-01-13 ENCOUNTER — HOSPITAL ENCOUNTER (OUTPATIENT)
Age: 83
Discharge: HOME | End: 2023-01-13
Payer: MEDICARE

## 2023-01-13 ENCOUNTER — HOSPITAL ENCOUNTER (OUTPATIENT)
Dept: INTERVENTIONAL RADIOLOGY/VASCULAR | Age: 83
End: 2023-01-13
Payer: MEDICARE

## 2023-01-13 DIAGNOSIS — M25.559 HIP PAIN: ICD-10-CM

## 2023-01-13 DIAGNOSIS — D69.6 THROMBOCYTOPENIA (HCC): ICD-10-CM

## 2023-01-13 LAB
ABSOLUTE EOS #: 0 K/UL (ref 0–0.4)
ABSOLUTE LYMPH #: 0.44 K/UL (ref 1–4.8)
ABSOLUTE MONO #: 0.07 K/UL (ref 0.1–1.3)
ALBUMIN SERPL-MCNC: 4.3 G/DL (ref 3.5–5.2)
ALP BLD-CCNC: 69 U/L (ref 35–104)
ALT SERPL-CCNC: 18 U/L (ref 5–33)
ANION GAP SERPL CALCULATED.3IONS-SCNC: 11 MMOL/L (ref 9–17)
AST SERPL-CCNC: 16 U/L
BASOPHILS # BLD: 1 % (ref 0–2)
BASOPHILS ABSOLUTE: 0.07 K/UL (ref 0–0.2)
BILIRUB SERPL-MCNC: 0.5 MG/DL (ref 0.3–1.2)
BUN BLDV-MCNC: 25 MG/DL (ref 8–23)
CALCIUM SERPL-MCNC: 10.2 MG/DL (ref 8.6–10.4)
CHLORIDE BLD-SCNC: 104 MMOL/L (ref 98–107)
CO2: 24 MMOL/L (ref 20–31)
CREAT SERPL-MCNC: 0.89 MG/DL (ref 0.5–0.9)
EOSINOPHILS RELATIVE PERCENT: 0 % (ref 0–4)
GFR SERPL CREATININE-BSD FRML MDRD: >60 ML/MIN/1.73M2
GLUCOSE BLD-MCNC: 258 MG/DL (ref 70–99)
HCT VFR BLD CALC: 40.3 % (ref 36–46)
HEMOGLOBIN: 13.2 G/DL (ref 12–16)
LYMPHOCYTES # BLD: 6 % (ref 24–44)
MCH RBC QN AUTO: 29.6 PG (ref 26–34)
MCHC RBC AUTO-ENTMCNC: 32.8 G/DL (ref 31–37)
MCV RBC AUTO: 90.3 FL (ref 80–100)
MONOCYTES # BLD: 1 % (ref 1–7)
MORPHOLOGY: ABNORMAL
PDW BLD-RTO: 14.4 % (ref 11.5–14.9)
PLATELET # BLD: 124 K/UL (ref 150–450)
PMV BLD AUTO: 11.8 FL (ref 6–12)
POTASSIUM SERPL-SCNC: 4.7 MMOL/L (ref 3.7–5.3)
RBC # BLD: 4.46 M/UL (ref 4–5.2)
SEG NEUTROPHILS: 92 % (ref 36–66)
SEGMENTED NEUTROPHILS ABSOLUTE COUNT: 6.72 K/UL (ref 1.3–9.1)
SODIUM BLD-SCNC: 139 MMOL/L (ref 135–144)
TOTAL PROTEIN: 7.1 G/DL (ref 6.4–8.3)
WBC # BLD: 7.3 K/UL (ref 3.5–11)

## 2023-01-13 PROCEDURE — 6360000002 HC RX W HCPCS: Performed by: ORTHOPAEDIC SURGERY

## 2023-01-13 PROCEDURE — 80053 COMPREHEN METABOLIC PANEL: CPT

## 2023-01-13 PROCEDURE — 20610 DRAIN/INJ JOINT/BURSA W/O US: CPT

## 2023-01-13 PROCEDURE — 77002 NEEDLE LOCALIZATION BY XRAY: CPT

## 2023-01-13 PROCEDURE — 82570 ASSAY OF URINE CREATININE: CPT

## 2023-01-13 PROCEDURE — 2500000003 HC RX 250 WO HCPCS: Performed by: ORTHOPAEDIC SURGERY

## 2023-01-13 PROCEDURE — 36415 COLL VENOUS BLD VENIPUNCTURE: CPT

## 2023-01-13 PROCEDURE — 6360000004 HC RX CONTRAST MEDICATION: Performed by: ORTHOPAEDIC SURGERY

## 2023-01-13 PROCEDURE — 82043 UR ALBUMIN QUANTITATIVE: CPT

## 2023-01-13 PROCEDURE — 85025 COMPLETE CBC W/AUTO DIFF WBC: CPT

## 2023-01-13 PROCEDURE — 2709999900 IR ARTHR/ASP/INJ MAJOR JT/BURSA RIGHT WO US

## 2023-01-13 RX ORDER — METHYLPREDNISOLONE ACETATE 80 MG/ML
80 INJECTION, SUSPENSION INTRA-ARTICULAR; INTRALESIONAL; INTRAMUSCULAR; SOFT TISSUE ONCE
Status: COMPLETED | OUTPATIENT
Start: 2023-01-13 | End: 2023-01-13

## 2023-01-13 RX ORDER — BUPIVACAINE HYDROCHLORIDE 5 MG/ML
4 INJECTION, SOLUTION EPIDURAL; INTRACAUDAL ONCE
Status: COMPLETED | OUTPATIENT
Start: 2023-01-13 | End: 2023-01-13

## 2023-01-13 RX ORDER — LIDOCAINE HYDROCHLORIDE 10 MG/ML
4 INJECTION, SOLUTION EPIDURAL; INFILTRATION; INTRACAUDAL; PERINEURAL ONCE
Status: COMPLETED | OUTPATIENT
Start: 2023-01-13 | End: 2023-01-13

## 2023-01-13 RX ADMIN — LIDOCAINE HYDROCHLORIDE 4 ML: 10 INJECTION, SOLUTION EPIDURAL; INFILTRATION; INTRACAUDAL; PERINEURAL at 15:26

## 2023-01-13 RX ADMIN — METHYLPREDNISOLONE ACETATE 80 MG: 80 INJECTION, SUSPENSION INTRA-ARTICULAR; INTRALESIONAL; INTRAMUSCULAR; SOFT TISSUE at 15:26

## 2023-01-13 RX ADMIN — BUPIVACAINE HYDROCHLORIDE 4 ML: 5 INJECTION, SOLUTION EPIDURAL; INTRACAUDAL; PERINEURAL at 15:26

## 2023-01-13 RX ADMIN — IOPAMIDOL 100 ML: 612 INJECTION, SOLUTION INTRAVENOUS at 15:24

## 2023-01-13 NOTE — PROGRESS NOTES
Patient tolerated right hip injection without distress. Dressing to site. Discharge instructions given, no questions at this time. Patient discharged home with spouse.

## 2023-01-14 LAB
CREATININE URINE: 89.7 MG/DL (ref 28–217)
MICROALBUMIN/CREAT 24H UR: 36 MG/L
MICROALBUMIN/CREAT UR-RTO: 40 MCG/MG CREAT

## 2023-01-23 ENCOUNTER — HOSPITAL ENCOUNTER (OUTPATIENT)
Age: 83
Setting detail: SPECIMEN
Discharge: HOME | End: 2023-01-23

## 2023-01-23 ENCOUNTER — OFFICE VISIT (OUTPATIENT)
Dept: PRIMARY CARE CLINIC | Age: 83
End: 2023-01-23
Payer: MEDICARE

## 2023-01-23 VITALS
SYSTOLIC BLOOD PRESSURE: 110 MMHG | HEART RATE: 78 BPM | BODY MASS INDEX: 31.98 KG/M2 | WEIGHT: 147.8 LBS | DIASTOLIC BLOOD PRESSURE: 60 MMHG | OXYGEN SATURATION: 96 %

## 2023-01-23 DIAGNOSIS — D69.6 THROMBOCYTOPENIA (HCC): ICD-10-CM

## 2023-01-23 DIAGNOSIS — E11.51 TYPE 2 DIABETES MELLITUS WITH DIABETIC PERIPHERAL ANGIOPATHY WITHOUT GANGRENE, WITHOUT LONG-TERM CURRENT USE OF INSULIN (HCC): ICD-10-CM

## 2023-01-23 DIAGNOSIS — B35.4 TINEA CORPORIS: ICD-10-CM

## 2023-01-23 DIAGNOSIS — R10.13 EPIGASTRIC PAIN: ICD-10-CM

## 2023-01-23 DIAGNOSIS — F33.40 RECURRENT MAJOR DEPRESSIVE DISORDER, IN REMISSION (HCC): ICD-10-CM

## 2023-01-23 DIAGNOSIS — R21 RASH: ICD-10-CM

## 2023-01-23 DIAGNOSIS — R42 DIZZINESS: ICD-10-CM

## 2023-01-23 DIAGNOSIS — R42 DIZZINESS: Primary | ICD-10-CM

## 2023-01-23 DIAGNOSIS — R07.9 CHEST PAIN, UNSPECIFIED TYPE: ICD-10-CM

## 2023-01-23 LAB
BACTERIA: ABNORMAL
BILIRUBIN URINE: NEGATIVE
CASTS UA: ABNORMAL /LPF (ref 0–8)
COLOR: YELLOW
EPITHELIAL CELLS UA: ABNORMAL /HPF (ref 0–5)
GLUCOSE URINE: ABNORMAL
KETONES, URINE: NEGATIVE
LEUKOCYTE ESTERASE, URINE: NEGATIVE
NITRITE, URINE: POSITIVE
PH UA: 5.5 (ref 5–8)
PROTEIN UA: NEGATIVE
RBC UA: ABNORMAL /HPF (ref 0–4)
SPECIFIC GRAVITY UA: 1.02 (ref 1–1.03)
TURBIDITY: ABNORMAL
URINE HGB: NEGATIVE
UROBILINOGEN, URINE: NORMAL
WBC UA: ABNORMAL /HPF (ref 0–5)

## 2023-01-23 PROCEDURE — G8417 CALC BMI ABV UP PARAM F/U: HCPCS | Performed by: FAMILY MEDICINE

## 2023-01-23 PROCEDURE — G8484 FLU IMMUNIZE NO ADMIN: HCPCS | Performed by: FAMILY MEDICINE

## 2023-01-23 PROCEDURE — 3078F DIAST BP <80 MM HG: CPT | Performed by: FAMILY MEDICINE

## 2023-01-23 PROCEDURE — 1036F TOBACCO NON-USER: CPT | Performed by: FAMILY MEDICINE

## 2023-01-23 PROCEDURE — G8427 DOCREV CUR MEDS BY ELIG CLIN: HCPCS | Performed by: FAMILY MEDICINE

## 2023-01-23 PROCEDURE — 3074F SYST BP LT 130 MM HG: CPT | Performed by: FAMILY MEDICINE

## 2023-01-23 PROCEDURE — G8400 PT W/DXA NO RESULTS DOC: HCPCS | Performed by: FAMILY MEDICINE

## 2023-01-23 PROCEDURE — 1090F PRES/ABSN URINE INCON ASSESS: CPT | Performed by: FAMILY MEDICINE

## 2023-01-23 PROCEDURE — 99214 OFFICE O/P EST MOD 30 MIN: CPT | Performed by: FAMILY MEDICINE

## 2023-01-23 PROCEDURE — 1123F ACP DISCUSS/DSCN MKR DOCD: CPT | Performed by: FAMILY MEDICINE

## 2023-01-23 RX ORDER — PRIMIDONE 50 MG/1
50 TABLET ORAL NIGHTLY
Qty: 180 TABLET | Refills: 3 | Status: SHIPPED
Start: 2023-01-23

## 2023-01-23 RX ORDER — NYSTATIN 100000 [USP'U]/G
POWDER TOPICAL
Qty: 45 G | Refills: 3 | Status: SHIPPED | OUTPATIENT
Start: 2023-01-23

## 2023-01-23 RX ORDER — ROPINIROLE 4 MG/1
4 TABLET, FILM COATED ORAL NIGHTLY
Qty: 30 TABLET | Refills: 1 | Status: SHIPPED
Start: 2023-01-23

## 2023-01-23 ASSESSMENT — PATIENT HEALTH QUESTIONNAIRE - PHQ9
10. IF YOU CHECKED OFF ANY PROBLEMS, HOW DIFFICULT HAVE THESE PROBLEMS MADE IT FOR YOU TO DO YOUR WORK, TAKE CARE OF THINGS AT HOME, OR GET ALONG WITH OTHER PEOPLE: 0
4. FEELING TIRED OR HAVING LITTLE ENERGY: 3
9. THOUGHTS THAT YOU WOULD BE BETTER OFF DEAD, OR OF HURTING YOURSELF: 0
6. FEELING BAD ABOUT YOURSELF - OR THAT YOU ARE A FAILURE OR HAVE LET YOURSELF OR YOUR FAMILY DOWN: 0
5. POOR APPETITE OR OVEREATING: 3
SUM OF ALL RESPONSES TO PHQ QUESTIONS 1-9: 9
8. MOVING OR SPEAKING SO SLOWLY THAT OTHER PEOPLE COULD HAVE NOTICED. OR THE OPPOSITE, BEING SO FIGETY OR RESTLESS THAT YOU HAVE BEEN MOVING AROUND A LOT MORE THAN USUAL: 0
SUM OF ALL RESPONSES TO PHQ QUESTIONS 1-9: 9
SUM OF ALL RESPONSES TO PHQ9 QUESTIONS 1 & 2: 0
2. FEELING DOWN, DEPRESSED OR HOPELESS: 0
SUM OF ALL RESPONSES TO PHQ QUESTIONS 1-9: 9
3. TROUBLE FALLING OR STAYING ASLEEP: 3
SUM OF ALL RESPONSES TO PHQ QUESTIONS 1-9: 9
1. LITTLE INTEREST OR PLEASURE IN DOING THINGS: 0
7. TROUBLE CONCENTRATING ON THINGS, SUCH AS READING THE NEWSPAPER OR WATCHING TELEVISION: 0

## 2023-01-23 ASSESSMENT — ENCOUNTER SYMPTOMS: SHORTNESS OF BREATH: 1

## 2023-01-23 NOTE — PROGRESS NOTES
[address] [facility]  [address] [facility] [id number]  Dept: [id number] [facility] Shira Hernandez is a 81 y.o. female Established patient, who presents today for her medical conditions/complaintsas noted below. Chief Complaint   Patient presents with    Dizziness     X 2 weeks. Shortness of Breath    Fatigue    Anorexia    Chest Congestion     Patient c/o chest tightness. No pain. HPI:     HPI  Sugars high: was on prednisone/steroids    Not feeling well x 2 weeks  Feels heavy in chest, tired  Feels scattered brained  Nervous, tired. Decreased apettite. Bad heartburn, epigastric pain: x 1 week. Tried gaviscon and pepcid ac and nexium    No diarrhea    Reviewed meds    Reviewed prior notes Cardiology  Reviewed previous Labs  Not on depression meds. What was last A1C ? Dr Concpeción Gage   LDL Cholesterol (mg/dL)   Date Value   07/06/2020 85   10/17/2018 137 (H)   07/16/2018 64     LDL Calculated (mg/dL)   Date Value   03/12/2015 47   08/18/2014 39       (goal LDL is <100)   AST (U/L)   Date Value   01/13/2023 16     ALT (U/L)   Date Value   01/13/2023 18     BUN (mg/dL)   Date Value   01/13/2023 25 (H)     Hemoglobin A1C (%)   Date Value   08/03/2021 6.9 (H)     TSH (uIU/mL)   Date Value   12/06/2022 2.07     BP Readings from Last 3 Encounters:   01/23/23 110/60   12/08/22 (!) 123/59   10/17/22 128/76          (goal 120/80)    Past Medical History:   Diagnosis Date    Age-related cognitive decline     Ankle pain     Left ankle fracture in ortho boat.     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    left with urinary incontinenc post surgical    Caffeine use     2 coffee/day, 1-2 tea per week    Chronic back pain     Chronic ITP (idiopathic thrombocytopenia) (HCC)     CVA (cerebral infarction) 2008, 2010    balance issues, short term memory loss    Diabetic gastroparesis ([address]     Diverticular disease 1986    GERD (gastroesophageal reflux disease)     Hiatal hernia     Hip fracture (HCC)     History of blood transfusion     History of decreased platelet count     Hyperlipemia     Hypertension     Internal hemorrhoid     Macular degeneration of left eye 1980's    S/P laser treatment    Nausea and vomiting     Neuropathy     Osteoarthritis     Ringing in ears     Self-catheterizes urinary bladder     6-7 times daily    TIA (transient ischemic attack) 2000    x1    Tubular adenoma     colon polyps    Type II or unspecified type diabetes mellitus without mention of complication, not stated as uncontrolled     Urinary incontinence     frequent UTI s/p infection, surgical dilatation lead to incontinence    Wears dentures     full on top, partial on bottom    Wears glasses       Past Surgical History:   Procedure Laterality Date    APPENDECTOMY  1962    BLADDER SURGERY      bladder stimulator    BLADDER SUSPENSION  2002    multiple    CARDIAC CATHETERIZATION  2008    no stents    CARDIOVASCULAR STRESS TEST  12/2014    CATARACT REMOVAL WITH IMPLANT Left 11/07/2017    Raffoul/StLiliane    CATARACT REMOVAL WITH IMPLANT Right 11/28/2017    Raffoul/StLiliane    COLON SURGERY      colostomy reversal    COLONOSCOPY  04/07/2016    tubular adenoma x3; internal hemorrhoids    COLONOSCOPY N/A 11/06/2019    COLONOSCOPY DIAGNOSTIC performed by Dominga Houser MD at . [facility]  11/06/2019    COLOSTOMY  1986    bowel obstruction/ rupture from diverticular disease, reversal 3 mos later    CORONARY ANGIOPLASTY WITH STENT PLACEMENT  08/04/2022    CYSTOSCOPY  09/08/2017    W/ 200IU Botox     FRACTURE SURGERY Right 2011    hip    FRACTURE SURGERY Left 2001    WRIST    FRACTURE SURGERY Left 2013    ankle    HERNIA REPAIR      HYSTERECTOMY (CERVIX STATUS UNKNOWN)  1969    JOINT REPLACEMENT Bilateral 2000    BILAT KNEES    LUMBAR FUSION  2017    L2/L3    MD XCAPSL CTRC RMVL INSJ IO LENS PROSTH W/O ECP Left 11/07/2017    EYE CATARACT EMULSIFICATION IOL IMPLANT performed by Prudence Christianson MD at [address] W/O ECP Right 2017    EYE CATARACT EMULSIFICATION IOL IMPLANT performed by Prudence Christianson MD at [address] Right 10/27/2015    WITH POLY EXCHANGE BIOMET AND GPS APPLICATION    REVISION TOTAL KNEE ARTHROPLASTY Left 2020    KNEE TOTAL ARTHROPLASTY REVISION - POLY EXCHANGE performed by Mercedes Gomez MD at [address]      fusion, did not take    [address]    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      TONSILLECTOMY      UPPER GASTROINTESTINAL ENDOSCOPY      UPPER GASTROINTESTINAL ENDOSCOPY  2014    UPPER GASTROINTESTINAL ENDOSCOPY  2016    mild gastritis    UPPER GASTROINTESTINAL ENDOSCOPY N/A 2018    retained thick secretions in proximal esophagus       Family History   Problem Relation Age of Onset    Breast Cancer Mother     Cancer Mother         breast and uterine  39    Heart Disease Father     Heart Attack Father          28    Cancer Maternal Grandmother     Cancer Brother 46        bronchogenic adenocarcinoma cancer    Lung Cancer Brother     Cancer Sister         lung    Lung Cancer Sister          72    Breast Cancer Sister          62    Cancer Sister         breast       Social History     Tobacco Use    Smoking status: Former     Packs/day: 0.50     Years: 30.00     Pack years: 15.00     Types: Cigarettes     Quit date: 1982     Years since quittin.6    Smokeless tobacco: Former     Quit date: 1982   Substance Use Topics    Alcohol use: No      Current Outpatient Medications   Medication Sig Dispense Refill    primidone (MYSOLINE) 50 MG tablet Take 1 tablet by mouth nightly 180 tablet 3    rOPINIRole (REQUIP) 4 MG tablet Take 1 tablet by mouth nightly 30 tablet 1    nystatin-triamcinolone (MYCOLOG II) 566993-1.1 UNIT/GM-% cream Apply topically 4 times daily Apply topically 4 times daily. 30 g 3    nystatin (MYCOSTATIN) 813298 UNIT/GM powder Apply 3 times daily.  45 g 3    isosorbide mononitrate (IMDUR) 30 MG extended release tablet       estradiol (ESTRACE) 0.1 MG/GM vaginal cream Place 2 g vaginally daily      nitrofurantoin, macrocrystal-monohydrate, (MACROBID) 100 MG capsule TAKE 1 CAPSULE BY MOUTH EVERY DAY 30 capsule 5    clopidogrel (PLAVIX) 75 MG tablet Take 75 mg by mouth daily      nitroGLYCERIN (NITROSTAT) 0.4 MG SL tablet 1 under the tongue as needed for angina, may repeat q5mins for up three doses      RECTIV 0.4 % rectal ointment       metoprolol succinate (TOPROL XL) 25 MG extended release tablet Take 12.5 mg by mouth daily      lidocaine (XYLOCAINE) 5 % ointment       LIVALO 2 MG TABS tablet TAKE 1 TABLET BY MOUTH IN THE EVENING 30 tablet 5    acetaminophen (TYLENOL) 500 MG tablet Take 2 tablets by mouth every 6 hours as needed for Pain 60 tablet 0    lubiprostone (AMITIZA) 8 MCG CAPS capsule Take 1 capsule by mouth daily 30 capsule 1    lidocaine (XYLOCAINE) 2 % jelly APPLY TOPICALLY AS NEEDED      albuterol (PROVENTIL) (5 MG/ML) 0.5% nebulizer solution Inhale 0.5 mLs into the lungs as needed       esomeprazole (NEXIUM) 40 MG delayed release capsule Take 1 capsule by mouth every morning (before breakfast) 90 capsule 3    hydrocortisone (ANUSOL-HC) 2.5 % CREA rectal cream Place rectally 2 times daily 28 g 3    vitamin B-12 (CYANOCOBALAMIN) 1000 MCG tablet Take 1 tablet by mouth once a week 30 tablet 3    aspirin 81 MG EC tablet Take 1 tablet by mouth 2 times daily 30 tablet 3    glipiZIDE (GLUCOTROL) 5 MG tablet 5mg in the am and 2.5 mg in the pm      Continuous Blood Gluc Sensor (FREESTYLE DAVIS 14 DAY SENSOR) MISC       Multiple Vitamins-Minerals (THERAPEUTIC MULTIVITAMIN-MINERALS) tablet Take 1 tablet by mouth daily       Multiple Vitamins-Minerals (PRESERVISION AREDS PO) Take by mouth daily       Continuous Blood Gluc  (FREESTYLE DAVIS 14 DAY READER) MATTHEW       CRANBERRY PO Take by mouth 2 times daily      D-Mannose 350 MG CAPS Take 300 mg by mouth daily 30 capsule 3     No current facility-administered medications for this visit. Facility-Administered Medications Ordered in Other Visits   Medication Dose Route Frequency Provider Last Rate Last Admin    0.9 % sodium chloride infusion 250 mL  250 mL IntraVENous Once Kai Cotto MD         Allergies   Allergen Reactions    Iodides Anaphylaxis, Hives and Itching     \"Contrast dyes\"  This was added by someone but Patient states has had IVP dyes multiple times without problems and had a myelogram in Dec 2016 without problems    Povidone-Iodine Anaphylaxis, Hives and Swelling    Sulfa Antibiotics Hives and Swelling     Other reaction(s): Unknown  Other reaction(s): Intolerance-unknown  Hands, feet, mouth, eyes  Other reaction(s): Unknown    Betadine [Povidone Iodine] Hives    Cephalexin Hives and Swelling    Cephalexin Itching     Other reaction(s): Hallucinations  In 1969 received demerol and keflex together so was told to list both as allergies    Cozaar [Losartan] Nausea Only     Pt also gets sores on toungue    Cymbalta [Duloxetine Hcl] Diarrhea and Nausea And Vomiting     Weakness    Demerol Hives and Swelling    Doxycycline Other (See Comments)     Sore mouth        Meperidine Itching     Other reaction(s): Hallucinations      Morphine Hives and Itching    Penicillins Hives, Swelling and Itching     Other reaction(s): Intolerance-unknown  Other reaction(s): Unknown    Zithromax [Azithromycin] Other (See Comments)     Sore mouth      Ciprofloxacin      Pt stated it gave her canker sores    Clindamycin/Lincomycin     Etodolac     Fluorescein     Iodine      Other reaction(s):  Intolerance-unknown    Lisinopril      cough    Penicillin G     Soap     Toviaz [Fesoterodine Fumarate Er]     Clonazepam Other (See Comments)     From neuro: made her too sleepy    Metformin And Related Nausea And Vomiting     Diarrhea and N/V       Health Maintenance   Topic Date Due    Annual Wellness Visit (AWV)  Never done    Lipids  09/08/2021    Depression Monitoring  10/03/2023    DTaP/Tdap/Td vaccine (2 - Td or Tdap) 05/12/2032    DEXA (modify frequency per FRAX score)  Completed    Flu vaccine  Completed    Shingles vaccine  Completed    Pneumococcal 65+ years Vaccine  Completed    COVID-19 Vaccine  Completed    Hepatitis A vaccine  Aged Out    Hib vaccine  Aged Out    Meningococcal (ACWY) vaccine  Aged Out       Subjective:      Review of Systems   Constitutional:  Positive for fatigue. Respiratory:  Positive for shortness of breath. Has to self catheterize    Objective:     /60   Pulse 78   Wt 147 lb 12.8 oz (67 kg)   SpO2 96%   BMI 31.98 kg/m²   Physical Exam  Vitals and nursing note reviewed. Constitutional:       General: She is not in acute distress. Appearance: She is well-developed. She is not ill-appearing. HENT:      Head: Normocephalic and atraumatic. Right Ear: External ear normal.      Left Ear: External ear normal.   Eyes:      General: No scleral icterus. Right eye: No discharge. Left eye: No discharge. Conjunctiva/sclera: Conjunctivae normal.   Neck:      Thyroid: No thyromegaly. Trachea: No tracheal deviation. Cardiovascular:      Rate and Rhythm: Normal rate and regular rhythm. Heart sounds: Normal heart sounds. Pulmonary:      Effort: Pulmonary effort is normal. No respiratory distress. Breath sounds: Normal breath sounds. No wheezing. Abdominal:      General: There is no distension. Palpations: Abdomen is soft. Tenderness: There is abdominal tenderness. Comments: Decreased BS  Tender epigastric area   Lymphadenopathy:      Cervical: No cervical adenopathy. Skin:     General: Skin is warm. Findings: No rash.    Neurological:      Mental Status: She is alert and oriented to person, place, and time. Psychiatric:         Mood and Affect: Mood normal.         Behavior: Behavior normal.       Assessment:       Diagnosis Orders   1. Dizziness  Urinalysis with Microscopic    XR CHEST (2 VW)      2. Recurrent major depressive disorder, in remission ([facility]      not on meds 1/23/23      3. Type 2 diabetes mellitus with diabetic peripheral angiopathy without gangrene, without long-term current use of insulin ([facility]        4. Thrombocytopenia (HCC)        5. Chest pain, unspecified type  XR CHEST (2 VW)      6. Epigastric pain  XR ABDOMEN (KUB) (SINGLE AP VIEW)      7. Rash  nystatin-triamcinolone (MYCOLOG II) 081763-4.1 UNIT/GM-% cream      8. Tinea corporis  nystatin (MYCOSTATIN) 787425 UNIT/GM powder             Plan:      Return in about 4 weeks (around 2/20/2023). BP low end :Stop irbersartan  Decrease primidone dose due to fatigue, SOB  Decrease requip due to fatigue and SOB  If epigastric pain not better with med changes she needs to call her cardiologist.   Call prn. Orders Placed This Encounter   Procedures    XR CHEST (2 VW)     Standing Status:   Future     Standing Expiration Date:   1/23/2024    XR ABDOMEN (KUB) (SINGLE AP VIEW)    Urinalysis with Microscopic     Standing Status:   Future     Standing Expiration Date:   1/23/2024     Order Specific Question:   SPECIFY(EX-CATH,MIDSTREAM,CYSTO,ETC)? Answer:   cath     Orders Placed This Encounter   Medications    primidone (MYSOLINE) 50 MG tablet     Sig: Take 1 tablet by mouth nightly     Dispense:  180 tablet     Refill:  3    rOPINIRole (REQUIP) 4 MG tablet     Sig: Take 1 tablet by mouth nightly     Dispense:  30 tablet     Refill:  1    nystatin-triamcinolone (MYCOLOG II) 370236-9.2 UNIT/GM-% cream     Sig: Apply topically 4 times daily Apply topically 4 times daily. Dispense:  30 g     Refill:  3    nystatin (MYCOSTATIN) 868356 UNIT/GM powder     Sig: Apply 3 times daily.      Dispense:  45 g Refill:  3       Patient given educationalmaterials - see patient instructions. Discussed use, benefit, and side effectsof prescribed medications. All patient questions answered. Pt voiced understanding. Reviewed health maintenance. Instructed to continue current medications, diet andexercise. Patient agreed with treatment plan. Follow up as directed.      Electronicallysigned by Sneha Xavier MD on 1/23/2023 at 3:10 PM

## 2023-01-24 ENCOUNTER — HOSPITAL ENCOUNTER (OUTPATIENT)
Dept: GENERAL RADIOLOGY | Facility: CLINIC | Age: 83
Discharge: HOME | End: 2023-01-26
Payer: MEDICARE

## 2023-01-24 ENCOUNTER — HOSPITAL ENCOUNTER (OUTPATIENT)
Facility: CLINIC | Age: 83
Discharge: HOME | End: 2023-01-26
Payer: MEDICARE

## 2023-01-24 DIAGNOSIS — R07.9 CHEST PAIN, UNSPECIFIED TYPE: ICD-10-CM

## 2023-01-24 DIAGNOSIS — R42 DIZZINESS: ICD-10-CM

## 2023-01-24 DIAGNOSIS — N39.0 URINARY TRACT INFECTION WITHOUT HEMATURIA, SITE UNSPECIFIED: Primary | ICD-10-CM

## 2023-01-24 DIAGNOSIS — R30.0 DYSURIA: ICD-10-CM

## 2023-01-24 PROCEDURE — 74018 RADEX ABDOMEN 1 VIEW: CPT

## 2023-01-24 PROCEDURE — 71046 X-RAY EXAM CHEST 2 VIEWS: CPT

## 2023-01-24 RX ORDER — LEVOFLOXACIN 250 MG/1
250 TABLET ORAL DAILY
Qty: 5 TABLET | Refills: 0 | Status: SHIPPED | OUTPATIENT
Start: 2023-01-24 | End: 2023-01-27 | Stop reason: ALTCHOICE

## 2023-01-24 NOTE — RESULT ENCOUNTER NOTE
Please see if lab can run a culture on the urine. Call patient : shows many bacteria but not a lot of WBC which usually occur if there is infection. Also shows sugar in the urine  Will send in antibiotic while hopefully the urine culture can be run. I will put order in for culture and meds.  Levaquin

## 2023-01-26 LAB
CULTURE: ABNORMAL
SPECIMEN DESCRIPTION: ABNORMAL

## 2023-01-27 DIAGNOSIS — N39.0 URINARY TRACT INFECTION ASSOCIATED WITH CATHETERIZATION OF URINARY TRACT, UNSPECIFIED INDWELLING URINARY CATHETER TYPE, SUBSEQUENT ENCOUNTER: Primary | ICD-10-CM

## 2023-01-27 DIAGNOSIS — T83.511D URINARY TRACT INFECTION ASSOCIATED WITH CATHETERIZATION OF URINARY TRACT, UNSPECIFIED INDWELLING URINARY CATHETER TYPE, SUBSEQUENT ENCOUNTER: Primary | ICD-10-CM

## 2023-01-27 RX ORDER — NITROFURANTOIN MACROCRYSTALS 50 MG/1
50 CAPSULE ORAL 2 TIMES DAILY
Qty: 14 CAPSULE | Refills: 0 | Status: SHIPPED | OUTPATIENT
Start: 2023-01-27 | End: 2023-02-03

## 2023-01-27 NOTE — RESULT ENCOUNTER NOTE
UTI + culture resistant to several antibiotics including levaquin, which she is on. Switch to maccrobid which may help and she is not allergic to it and I would like her to see an infectious disease doctor.

## 2023-02-08 ENCOUNTER — OFFICE VISIT (OUTPATIENT)
Dept: INFECTIOUS DISEASES | Age: 83
End: 2023-02-08
Payer: MEDICARE

## 2023-02-08 VITALS
HEIGHT: 57 IN | WEIGHT: 147 LBS | OXYGEN SATURATION: 96 % | RESPIRATION RATE: 15 BRPM | BODY MASS INDEX: 31.71 KG/M2 | DIASTOLIC BLOOD PRESSURE: 64 MMHG | SYSTOLIC BLOOD PRESSURE: 131 MMHG | TEMPERATURE: 97.6 F | HEART RATE: 65 BPM

## 2023-02-08 DIAGNOSIS — N39.0 RECURRENT UTI: Primary | ICD-10-CM

## 2023-02-08 PROCEDURE — G8400 PT W/DXA NO RESULTS DOC: HCPCS | Performed by: INTERNAL MEDICINE

## 2023-02-08 PROCEDURE — 99213 OFFICE O/P EST LOW 20 MIN: CPT | Performed by: INTERNAL MEDICINE

## 2023-02-08 PROCEDURE — 3078F DIAST BP <80 MM HG: CPT | Performed by: INTERNAL MEDICINE

## 2023-02-08 PROCEDURE — 1123F ACP DISCUSS/DSCN MKR DOCD: CPT | Performed by: INTERNAL MEDICINE

## 2023-02-08 PROCEDURE — 3075F SYST BP GE 130 - 139MM HG: CPT | Performed by: INTERNAL MEDICINE

## 2023-02-08 PROCEDURE — 1090F PRES/ABSN URINE INCON ASSESS: CPT | Performed by: INTERNAL MEDICINE

## 2023-02-08 PROCEDURE — 1036F TOBACCO NON-USER: CPT | Performed by: INTERNAL MEDICINE

## 2023-02-08 PROCEDURE — G8417 CALC BMI ABV UP PARAM F/U: HCPCS | Performed by: INTERNAL MEDICINE

## 2023-02-08 PROCEDURE — G8427 DOCREV CUR MEDS BY ELIG CLIN: HCPCS | Performed by: INTERNAL MEDICINE

## 2023-02-08 PROCEDURE — G8484 FLU IMMUNIZE NO ADMIN: HCPCS | Performed by: INTERNAL MEDICINE

## 2023-02-08 RX ORDER — RANOLAZINE 500 MG/1
500 TABLET, EXTENDED RELEASE ORAL 2 TIMES DAILY
COMMUNITY
Start: 2023-02-07

## 2023-02-08 NOTE — PROGRESS NOTES
Infectious disease Consult Note      Patient: Karly Marcos  : 1940  Acct#:  [de-identified]     Date:  2023    Subjective:       History of Present Illness  Patient is a 81 y.o.  female   Chief Complaint   Patient presents with    Follow-up     Right flank pain or back pain     The patient was referred by Dr. Allyn Aleln for recurrent UTIs  The patient had urinary retention does straight cath for years. She is on Myrbetriq      She had multiple antibiotics allergy include sulfa, cephalexin, penicillin, clindamycin and Zithromax. Previous urine culture on 2021, 2021 and 2021 grew yeast, not Candida albicans or Candida DUBLINIENSIS  Urine culture on 3 /29/21 and 2021 grew E. Coli  History of chronic ITP, CVA, hypertension, hyperlipidemia, diabetes mellitus, diabetic gastroparesis. Interstim sacral neuromodulation device  please placed 12/10/2013 removed 21 status post Botox 300 IU 2021. She had cystoscopy done  She is up-to-date on Covid 19 vaccine and flu  Interval history 2021  She is complaining of frequency with urination and dysuria  She is finishing a course of Diflucan and Levaquin. Urine culture on 2021 grew   YEAST, NOT CANDIDA ALBICANS OR CANDIDA DUBLINIENSIS >911948 CFU/ML Abnormal      Interval history 2022    The patient was treated with high-dose Diflucan 400 daily for 7 days last month. She is feeling well today, denied urinary symptoms, no frequency or burning, still use straight cath, denied abdominal pain, no fever or chills, no other complaints. She is on estradiol cream, d-mannose and cranberry pills. Interval history 2022  The patient is complaining of urgency, dysuria, burning with urination and vaginal itching started around 10 days ago after she was started on oral Cipro for left leg cellulitis.   She has been doing Straight cath    Interval history 2022  The patient is feeling better, denied urinary symptoms, no vaginal itching or discharge,, denied fever or chills. She is complaining of intermittent right flank pain. 6/8/2022 urine culture was negative  She completed a course of Diflucan  Interval history 2/8/2023  The patient had urinary incontinence, doing straight cath, denied burning, denied change in the urine color or smell, denied fever or chills, no other complaints. Renal ultrasound on 6/22/2022 showed mild right hydronephrosis  The patient has been on Macrobid for prophylaxis . Last urine culture on 1/23/2023 grew E. coli that was sensitive to [address]  Past Medical History:   Diagnosis Date    Age-related cognitive decline     Ankle pain     Left ankle fracture in ortho boat.     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    left with urinary incontinenc post surgical    Caffeine use     2 coffee/day, 1-2 tea per week    Chronic back pain     Chronic ITP (idiopathic thrombocytopenia) (HCC)     CVA (cerebral infarction) 2008, 2010    balance issues, short term memory loss    Diabetic gastroparesis ([address]     Diverticular disease 1986    GERD (gastroesophageal reflux disease)     Hiatal hernia     Hip fracture (HCC)     History of blood transfusion     History of decreased platelet count     Hyperlipemia     Hypertension     Internal hemorrhoid     Macular degeneration of left eye 1980's    S/P laser treatment    Nausea and vomiting     Neuropathy     Osteoarthritis     Ringing in ears     Self-catheterizes urinary bladder     6-7 times daily    TIA (transient ischemic attack) 2000    x1    Tubular adenoma     colon polyps    Type II or unspecified type diabetes mellitus without mention of complication, not stated as uncontrolled     Urinary incontinence     frequent UTI s/p infection, surgical dilatation lead to incontinence    Wears dentures     full on top, partial on bottom    Wears glasses       Past Surgical History:   Procedure Laterality Date APPENDECTOMY  1962    BLADDER SURGERY      bladder stimulator    BLADDER SUSPENSION  2002    multiple    CARDIAC CATHETERIZATION  2008    no stents    CARDIOVASCULAR STRESS TEST  12/2014    CATARACT REMOVAL WITH IMPLANT Left 11/07/2017    [facility]    CATARACT REMOVAL WITH IMPLANT Right 11/28/2017    [facility]    COLON SURGERY      colostomy reversal    COLONOSCOPY  04/07/2016    tubular adenoma x3; internal hemorrhoids    COLONOSCOPY N/A 11/06/2019    COLONOSCOPY DIAGNOSTIC performed by Nikolai Calderon MD at [address]  11/06/2019    COLOSTOMY  1986    bowel obstruction/ rupture from diverticular disease, reversal 3 mos later    CORONARY ANGIOPLASTY WITH STENT PLACEMENT  08/04/2022    CYSTOSCOPY  09/08/2017    W/ 200IU Botox     FRACTURE SURGERY Right 2011    hip    FRACTURE SURGERY Left 2001    WRIST    FRACTURE SURGERY Left 2013    ankle    HERNIA REPAIR      HYSTERECTOMY (CERVIX STATUS UNKNOWN)  [facility] Bilateral 2000    BILAT KNEES    LUMBAR FUSION  2017    L2/L3    MA XCAPSL CTRC RMVL INSJ IO LENS PROSTH W/O ECP Left 11/07/2017    EYE CATARACT EMULSIFICATION IOL IMPLANT performed by Emeli Martinez MD at [address] W/O ECP Right 11/28/2017    EYE CATARACT EMULSIFICATION IOL IMPLANT performed by Emeli Martinez MD at [facility] Right 10/27/2015    WITH POLY EXCHANGE BIOMET AND GPS APPLICATION    REVISION TOTAL KNEE ARTHROPLASTY Left 07/14/2020    KNEE TOTAL ARTHROPLASTY REVISION - POLY EXCHANGE performed by Corinne Kawasaki, MD at [facility]  2010    fusion, did not take    [facility]    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      TONSILLECTOMY  1978    UPPER GASTROINTESTINAL ENDOSCOPY      UPPER GASTROINTESTINAL ENDOSCOPY  05/05/2014    UPPER GASTROINTESTINAL ENDOSCOPY  04/07/2016    mild gastritis    UPPER GASTROINTESTINAL ENDOSCOPY N/A 07/17/2018    retained thick secretions in proximal esophagus          Admission Meds  Current Outpatient Medications on File Prior to Visit   Medication Sig Dispense Refill    ranolazine (RANEXA) 500 MG extended release tablet Take 500 mg by mouth 2 times daily      primidone (MYSOLINE) 50 MG tablet Take 1 tablet by mouth nightly 180 tablet 3    rOPINIRole (REQUIP) 4 MG tablet Take 1 tablet by mouth nightly 30 tablet 1    nystatin-triamcinolone (MYCOLOG II) 516644-4.1 UNIT/GM-% cream Apply topically 4 times daily Apply topically 4 times daily. 30 g 3    nystatin (MYCOSTATIN) 133732 UNIT/GM powder Apply 3 times daily.  45 g 3    isosorbide mononitrate (IMDUR) 30 MG extended release tablet       estradiol (ESTRACE) 0.1 MG/GM vaginal cream Place 2 g vaginally daily      nitrofurantoin, macrocrystal-monohydrate, (MACROBID) 100 MG capsule TAKE 1 CAPSULE BY MOUTH EVERY DAY 30 capsule 5    clopidogrel (PLAVIX) 75 MG tablet Take 75 mg by mouth daily      nitroGLYCERIN (NITROSTAT) 0.4 MG SL tablet 1 under the tongue as needed for angina, may repeat q5mins for up three doses      RECTIV 0.4 % rectal ointment       metoprolol succinate (TOPROL XL) 25 MG extended release tablet Take 12.5 mg by mouth daily      lidocaine (XYLOCAINE) 5 % ointment       LIVALO 2 MG TABS tablet TAKE 1 TABLET BY MOUTH IN THE EVENING 30 tablet 5    acetaminophen (TYLENOL) 500 MG tablet Take 2 tablets by mouth every 6 hours as needed for Pain 60 tablet 0    lubiprostone (AMITIZA) 8 MCG CAPS capsule Take 1 capsule by mouth daily 30 capsule 1    lidocaine (XYLOCAINE) 2 % jelly APPLY TOPICALLY AS NEEDED      albuterol (PROVENTIL) (5 MG/ML) 0.5% nebulizer solution Inhale 0.5 mLs into the lungs as needed       esomeprazole (NEXIUM) 40 MG delayed release capsule Take 1 capsule by mouth every morning (before breakfast) 90 capsule 3    hydrocortisone (ANUSOL-HC) 2.5 % CREA rectal cream Place rectally 2 times daily 28 g 3    vitamin B-12 (CYANOCOBALAMIN) 1000 MCG tablet Take 1 tablet by mouth once a week 30 tablet 3    aspirin 81 MG EC tablet Take 1 tablet by mouth 2 times daily 30 tablet 3    glipiZIDE (GLUCOTROL) 5 MG tablet 5mg in the am and 2.5 mg in the pm      Continuous Blood Gluc Sensor (FREESTYLE DAVIS 14 DAY SENSOR) MISC       Multiple Vitamins-Minerals (THERAPEUTIC MULTIVITAMIN-MINERALS) tablet Take 1 tablet by mouth daily       Multiple Vitamins-Minerals (PRESERVISION AREDS PO) Take by mouth daily       Continuous Blood Gluc  (FREESTYLE DAVIS 14 DAY READER) MATTHEW       CRANBERRY PO Take by mouth 2 times daily      D-Mannose 350 MG CAPS Take 300 mg by mouth daily 30 capsule 3     Current Facility-Administered Medications on File Prior to Visit   Medication Dose Route Frequency Provider Last Rate Last Admin    0.9 % sodium chloride infusion 250 mL  250 mL IntraVENous Once Hussain Dickson MD               Allergies  Allergies   Allergen Reactions    Iodides Anaphylaxis, Hives and Itching     \"Contrast dyes\"  This was added by someone but Patient states has had IVP dyes multiple times without problems and had a myelogram in Dec 2016 without problems    Povidone-Iodine Anaphylaxis, Hives and Swelling    Sulfa Antibiotics Hives and Swelling     Other reaction(s): Unknown  Other reaction(s):  Intolerance-unknown  Hands, feet, mouth, eyes  Other reaction(s): Unknown    Betadine [Povidone Iodine] Hives    Cephalexin Hives and Swelling    Cephalexin Itching     Other reaction(s): Hallucinations  In 1969 received demerol and keflex together so was told to list both as allergies    Cozaar [Losartan] Nausea Only     Pt also gets sores on toungue    Cymbalta [Duloxetine Hcl] Diarrhea and Nausea And Vomiting     Weakness    Demerol Hives and Swelling    Doxycycline Other (See Comments)     Sore mouth        Meperidine Itching     Other reaction(s): Hallucinations      Morphine Hives and Itching    Penicillins Hives, Swelling and Itching     Other reaction(s): Intolerance-unknown  Other reaction(s): Unknown    Zithromax [Azithromycin] Other (See Comments)     Sore mouth      Ciprofloxacin      Pt stated it gave her canker sores    Clindamycin/Lincomycin     Etodolac     Fluorescein     Iodine      Other reaction(s): Intolerance-unknown    Lisinopril      cough    Penicillin G     Soap     Toviaz [Fesoterodine Fumarate Er]     Clonazepam Other (See Comments)     From neuro: made her too sleepy    Metformin And Related Nausea And Vomiting     Diarrhea and N/V        Social   Social History     Tobacco Use    Smoking status: Former     Packs/day: 0.50     Years: 30.00     Pack years: 15.00     Types: Cigarettes     Quit date: 1982     Years since quittin.7    Smokeless tobacco: Former     Quit date: 1982   Substance Use Topics    Alcohol use: No             Family History   Problem Relation Age of Onset    Breast Cancer Mother     Cancer Mother         breast and uterine  39    Heart Disease Father     Heart Attack Father          28    Cancer Maternal Grandmother     Cancer Brother 46        bronchogenic adenocarcinoma cancer    Lung Cancer Brother     Cancer Sister         lung    Lung Cancer Sister          72    Breast Cancer Sister          62    Cancer Sister         breast          Review of Systems    Other than above 12 systems reviewed were negative . Physical Exam  /64   Pulse 65   Temp 97.6 °F (36.4 °C)   Resp 15   Ht 4' 9\" (1.448 m)   Wt 147 lb (66.7 kg)   SpO2 96%   BMI 31.81 kg/m²           General Appearance: alert and oriented to person, place and time, well-developed and well-nourished, in no acute distress  Skin: warm and dry, no rash or erythema  Head: normocephalic and atraumatic  Eyes: pupils equal, round, and reactive to light, extraocular eye movements intact, conjunctivae normal  ENT: hearing grossly normal bilaterally.   Neck: neck supple and non tender . Pulmonary/Chest: clear to auscultation bilaterally- no wheezes, rales or rhonchi, normal air movement, no respiratory distress  Cardiovascular: normal rate, regular rhythm, normal S1 and S2, no murmurs.   Abdomen: soft, non-tender, non-distended, normal bowel sounds, no masses or organomegaly  Extremities: no cyanosis, clubbing or edema  Musculoskeletal: normal range of motion, no joint swelling, deformity or tenderness  Neurologic: no cranial nerve deficit and muscle strength normal    Data Review:    WBC   Date Value Ref Range Status   01/13/2023 7.3 3.5 - 11.0 k/uL Final   03/20/2022 4.5 3.5 - 11.0 k/uL Final   02/02/2022 5.0 3.5 - 11.0 k/uL Final     Hemoglobin   Date Value Ref Range Status   01/13/2023 13.2 12.0 - 16.0 g/dL Final   03/20/2022 12.3 12.0 - 16.0 g/dL Final   02/02/2022 12.7 12.0 - 16.0 g/dL Final     Hematocrit   Date Value Ref Range Status   01/13/2023 40.3 36 - 46 % Final   03/20/2022 37.4 36 - 46 % Final   02/02/2022 38.9 36 - 46 % Final     MCV   Date Value Ref Range Status   01/13/2023 90.3 80 - 100 fL Final   03/20/2022 88.7 80 - 100 fL Final   02/02/2022 90.7 80 - 100 fL Final     Platelets   Date Value Ref Range Status   01/13/2023 124 (L) 150 - 450 k/uL Final   03/20/2022 134 (L) 150 - 450 k/uL Final   02/02/2022 124 (L) 150 - 450 k/uL Final     Sodium   Date Value Ref Range Status   01/13/2023 139 135 - 144 mmol/L Final   03/20/2022 136 135 - 144 mmol/L Final   07/28/2021 140 135 - 144 mmol/L Final     Potassium   Date Value Ref Range Status   01/13/2023 4.7 3.7 - 5.3 mmol/L Final   03/20/2022 3.6 (L) 3.7 - 5.3 mmol/L Final   07/28/2021 4.6 3.7 - 5.3 mmol/L Final     Chloride   Date Value Ref Range Status   01/13/2023 104 98 - 107 mmol/L Final   03/20/2022 101 98 - 107 mmol/L Final   07/28/2021 107 98 - 107 mmol/L Final     CO2   Date Value Ref Range Status   01/13/2023 24 20 - 31 mmol/L Final   03/20/2022 21 20 - 31 mmol/L Final   07/28/2021 20 20 - 31 mmol/L Final     Phosphorus   Date Value Ref Range Status   08/24/2018 3.5 2.6 - 4.5 mg/dL Final     BUN   Date Value Ref Range Status   01/13/2023 25 (H) 8 - 23 mg/dL Final   03/20/2022 19 8 - 23 mg/dL Final   07/28/2021 22 8 - 23 mg/dL Final     Creatinine   Date Value Ref Range Status   01/13/2023 0.89 0.50 - 0.90 mg/dL Final   06/07/2022 0.77 0.50 - 0.90 mg/dL Final   03/20/2022 0.72 0.50 - 0.90 mg/dL Final     AST   Date Value Ref Range Status   01/13/2023 16 <32 U/L Final   03/20/2022 17 <32 U/L Final   07/28/2021 17 <32 U/L Final     ALT   Date Value Ref Range Status   01/13/2023 18 5 - 33 U/L Final   03/20/2022 16 5 - 33 U/L Final   07/28/2021 18 5 - 33 U/L Final     Total Bilirubin   Date Value Ref Range Status   01/13/2023 0.5 0.3 - 1.2 mg/dL Final   03/20/2022 0.54 0.3 - 1.2 mg/dL Final   07/28/2021 0.58 0.3 - 1.2 mg/dL Final     Alkaline Phosphatase   Date Value Ref Range Status   01/13/2023 69 35 - 104 U/L Final   03/20/2022 77 35 - 104 U/L Final   07/28/2021 70 35 - 104 U/L Final     Lipase   Date Value Ref Range Status   01/06/2017 41 13 - 60 U/L Final     Comment:     [facility] [address] [address] [phone]   03/12/2016 15 13 - 60 U/L Final     Comment:     Performed at [address]   [phone]       Amylase   Date Value Ref Range Status   01/06/2017 47 28 - 100 U/L Final     Comment:     [facility] [address] [address] [phone]   03/12/2016 18 (L) 28 - 100 U/L Final     Comment:     Performed at [address]   [phone]       Protime   Date Value Ref Range Status   03/20/2022 13.1 11.8 - 14.6 sec Final   01/20/2022 13.5 11.8 - 14.6 sec Final   02/21/2017 10.8 9.7 - 12.0 sec Final     INR   Date Value Ref Range Status   03/20/2022 1.0  Final     Comment:           Non-therapeutic Range:     INR = 0.9-1.2  Therapeutic Range:    Moderate Anticoagulant Intensity:     INR = 2.0-3.0   High Anticoagulant Intensity:     INR = 2.5-3.5           01/20/2022 1.0  Final     Comment:           Non-therapeutic Range:     INR = 0.9-1.2  Therapeutic Range: Moderate Anticoagulant Intensity:     INR = 2.0-3.0   High Anticoagulant Intensity:     INR = 2.5-3.5           02/21/2017 1.0  Final     Comment:           Non-therapeutic Range:     INR = 0.9-1.2  Therapeutic Range: Moderate Anticoagulant Intensity:     INR = 2.0-3.0   High Anticoagulant Intensity:     INR = 2.5-3.5        Performed at [facility]: JUDD MERINO [address] [place] [address]. [address]   [phone]       No results found for: PTT  No results found for: OCCULTBLD  No results found for: GLUMET     Imaging Studies:                           All appropriate imaging studies and reports reviewed: Yes  No results found. Assessment:     Recurrent UTIs   History of chronic ITP, CVA, hypertension, hyperlipidemia, diabetes mellitus, diabetic gastroparesis. Recommendations:   Continue Macrobid for prophylaxis  Follow CBC and renal function  Follow-up in 6 months    No orders of the defined types were placed in this encounter. Thank you for allowing me to participate in the care of your patient. Please feel free to contact me with any questions or concerns.      Dian Robert MD

## 2023-02-09 ENCOUNTER — OFFICE VISIT (OUTPATIENT)
Dept: PRIMARY CARE CLINIC | Age: 83
End: 2023-02-09

## 2023-02-09 VITALS
BODY MASS INDEX: 33.4 KG/M2 | DIASTOLIC BLOOD PRESSURE: 76 MMHG | SYSTOLIC BLOOD PRESSURE: 124 MMHG | HEIGHT: 57 IN | OXYGEN SATURATION: 97 % | HEART RATE: 61 BPM | WEIGHT: 154.8 LBS

## 2023-02-09 DIAGNOSIS — I25.10 CORONARY ARTERY DISEASE INVOLVING NATIVE CORONARY ARTERY OF NATIVE HEART WITHOUT ANGINA PECTORIS: ICD-10-CM

## 2023-02-09 DIAGNOSIS — R06.02 SHORTNESS OF BREATH: Primary | ICD-10-CM

## 2023-02-09 DIAGNOSIS — R53.83 OTHER FATIGUE: ICD-10-CM

## 2023-02-09 DIAGNOSIS — Z98.61 CORONARY ANGIOPLASTY STATUS: ICD-10-CM

## 2023-02-09 SDOH — ECONOMIC STABILITY: FOOD INSECURITY: WITHIN THE PAST 12 MONTHS, YOU WORRIED THAT YOUR FOOD WOULD RUN OUT BEFORE YOU GOT MONEY TO BUY MORE.: NEVER TRUE

## 2023-02-09 SDOH — ECONOMIC STABILITY: FOOD INSECURITY: WITHIN THE PAST 12 MONTHS, THE FOOD YOU BOUGHT JUST DIDN'T LAST AND YOU DIDN'T HAVE MONEY TO GET MORE.: NEVER TRUE

## 2023-02-09 SDOH — ECONOMIC STABILITY: HOUSING INSECURITY
IN THE LAST 12 MONTHS, WAS THERE A TIME WHEN YOU DID NOT HAVE A STEADY PLACE TO SLEEP OR SLEPT IN A SHELTER (INCLUDING NOW)?: NO

## 2023-02-09 SDOH — ECONOMIC STABILITY: INCOME INSECURITY: HOW HARD IS IT FOR YOU TO PAY FOR THE VERY BASICS LIKE FOOD, HOUSING, MEDICAL CARE, AND HEATING?: NOT HARD AT ALL

## 2023-02-09 ASSESSMENT — ENCOUNTER SYMPTOMS: SHORTNESS OF BREATH: 1

## 2023-02-09 NOTE — PROGRESS NOTES
[address] [facility] PRIMARY CARE  [address]  [facility] [address]  Dept: [id number] [facility] Heber Trent is a 81 y.o. female Established patient, who presents today for her medical conditions/complaintsas noted below. Chief Complaint   Patient presents with    ED Follow-up    Other     Pt states after taking morning medications she is really fatigued and drowsy        HPI:     HPI  Am meds: she's not sure what they are  1/2 glipizide in evening now from Dr Mcbride Haw Dr Anu Rice for DM and he changed her pills. She doesn't have her list.     Exhausted  after she takes her pills in the am since August after the Stent. Gets tired going from parking lot to  the cardiologist office. Tired/exhausted making dinner    Reviewed prior notes Cardiology  Reviewed previous Labs, Imaging, and Hospital Records  Cardiologist started Ranexa for the chest pain    LDL Cholesterol (mg/dL)   Date Value   07/06/2020 85   10/17/2018 137 (H)   07/16/2018 64     LDL Calculated (mg/dL)   Date Value   03/12/2015 47   08/18/2014 39       (goal LDL is <100)   AST (U/L)   Date Value   01/13/2023 16     ALT (U/L)   Date Value   01/13/2023 18     BUN (mg/dL)   Date Value   01/13/2023 25 (H)     Hemoglobin A1C (%)   Date Value   08/03/2021 6.9 (H)     TSH (uIU/mL)   Date Value   12/06/2022 2.07     BP Readings from Last 3 Encounters:   02/09/23 124/76   02/08/23 131/64   01/23/23 110/60          (goal 120/80)    Past Medical History:   Diagnosis Date    Age-related cognitive decline     Ankle pain     Left ankle fracture in ortho boat.     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    left with urinary incontinenc post surgical    Caffeine use     2 coffee/day, 1-2 tea per week    Chronic back pain     Chronic ITP (idiopathic thrombocytopenia) (HCC)     CVA (cerebral infarction) 2008, 2010    balance issues, short term memory loss    Diabetic gastroparesis [facility])     Diverticular disease 1986    GERD (gastroesophageal reflux disease)     Hiatal hernia     Hip fracture (HCC)     History of blood transfusion     History of decreased platelet count     Hyperlipemia     Hypertension     Internal hemorrhoid     Macular degeneration of left eye 1980's    S/P laser treatment    Nausea and vomiting     Neuropathy     Osteoarthritis     Ringing in ears     Self-catheterizes urinary bladder     6-7 times daily    TIA (transient ischemic attack) 2000    x1    Tubular adenoma     colon polyps    Type II or unspecified type diabetes mellitus without mention of complication, not stated as uncontrolled     Urinary incontinence     frequent UTI s/p infection, surgical dilatation lead to incontinence    Wears dentures     full on top, partial on bottom    Wears glasses       Past Surgical History:   Procedure Laterality Date    APPENDECTOMY  1962    BLADDER SURGERY      bladder stimulator    BLADDER SUSPENSION  2002    multiple    CARDIAC CATHETERIZATION  2008    no stents    CARDIOVASCULAR STRESS TEST  12/2014    CATARACT REMOVAL WITH IMPLANT Left 11/07/2017    [facility]    CATARACT REMOVAL WITH IMPLANT Right 11/28/2017    [facility]    COLON SURGERY      colostomy reversal    COLONOSCOPY  04/07/2016    tubular adenoma x3; internal hemorrhoids    COLONOSCOPY N/A 11/06/2019    COLONOSCOPY DIAGNOSTIC performed by Yara Brizuela MD at . [facility]  11/06/2019    COLOSTOMY  1986    bowel obstruction/ rupture from diverticular disease, reversal 3 mos later    CORONARY ANGIOPLASTY WITH STENT PLACEMENT  08/04/2022    CYSTOSCOPY  09/08/2017    W/ 200IU Botox     FRACTURE SURGERY Right 2011    hip    FRACTURE SURGERY Left 2001    WRIST    FRACTURE SURGERY Left 2013    ankle    HERNIA REPAIR      HYSTERECTOMY (CERVIX STATUS UNKNOWN)  1969    JOINT REPLACEMENT Bilateral 2000    BILAT KNEES    LUMBAR FUSION  2017    L2/L3    AK XCAPSL CTRC RMVL INSJ IO LENS PROSTH W/O ECP Left 2017    EYE CATARACT EMULSIFICATION IOL IMPLANT performed by Sohail Camargo MD at [address] W/O ECP Right 2017    EYE CATARACT EMULSIFICATION IOL IMPLANT performed by Sohail Camargo MD at [address] Right 10/27/2015    WITH POLY EXCHANGE BIOMET AND GPS APPLICATION    REVISION TOTAL KNEE ARTHROPLASTY Left 2020    KNEE TOTAL ARTHROPLASTY REVISION - POLY EXCHANGE performed by Della Dee MD at [address] [facility]      fusion, did not take    [address] [facility]    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      TONSILLECTOMY      UPPER GASTROINTESTINAL ENDOSCOPY      UPPER GASTROINTESTINAL ENDOSCOPY  2014    UPPER GASTROINTESTINAL ENDOSCOPY  2016    mild gastritis    UPPER GASTROINTESTINAL ENDOSCOPY N/A 2018    retained thick secretions in proximal esophagus       Family History   Problem Relation Age of Onset    Breast Cancer Mother     Cancer Mother         breast and uterine  39    Heart Disease Father     Heart Attack Father          28    Cancer Maternal Grandmother     Cancer Brother 46        bronchogenic adenocarcinoma cancer    Lung Cancer Brother     Cancer Sister         lung    Lung Cancer Sister          72    Breast Cancer Sister          62    Cancer Sister         breast       Social History     Tobacco Use    Smoking status: Former     Packs/day: 0.50     Years: 30.00     Pack years: 15.00     Types: Cigarettes     Quit date: 1982     Years since quittin.7    Smokeless tobacco: Former     Quit date: 1982   Substance Use Topics    Alcohol use: No      Current Outpatient Medications   Medication Sig Dispense Refill    ranolazine (RANEXA) 500 MG extended release tablet Take 500 mg by mouth 2 times daily      primidone (MYSOLINE) 50 MG tablet Take 1 tablet by mouth nightly 180 tablet 3    rOPINIRole (REQUIP) 4 MG tablet Take 1 tablet by mouth nightly 30 tablet 1    nystatin-triamcinolone (MYCOLOG II) 349097-3.1 UNIT/GM-% cream Apply topically 4 times daily Apply topically 4 times daily. 30 g 3    nystatin (MYCOSTATIN) 807445 UNIT/GM powder Apply 3 times daily.  45 g 3    isosorbide mononitrate (IMDUR) 30 MG extended release tablet       estradiol (ESTRACE) 0.1 MG/GM vaginal cream Place 2 g vaginally daily      nitrofurantoin, macrocrystal-monohydrate, (MACROBID) 100 MG capsule TAKE 1 CAPSULE BY MOUTH EVERY DAY 30 capsule 5    clopidogrel (PLAVIX) 75 MG tablet Take 75 mg by mouth daily      nitroGLYCERIN (NITROSTAT) 0.4 MG SL tablet 1 under the tongue as needed for angina, may repeat q5mins for up three doses      RECTIV 0.4 % rectal ointment       metoprolol succinate (TOPROL XL) 25 MG extended release tablet Take 12.5 mg by mouth daily      lidocaine (XYLOCAINE) 5 % ointment       LIVALO 2 MG TABS tablet TAKE 1 TABLET BY MOUTH IN THE EVENING 30 tablet 5    acetaminophen (TYLENOL) 500 MG tablet Take 2 tablets by mouth every 6 hours as needed for Pain 60 tablet 0    D-Mannose 350 MG CAPS Take 300 mg by mouth daily 30 capsule 3    lubiprostone (AMITIZA) 8 MCG CAPS capsule Take 1 capsule by mouth daily 30 capsule 1    lidocaine (XYLOCAINE) 2 % jelly APPLY TOPICALLY AS NEEDED      albuterol (PROVENTIL) (5 MG/ML) 0.5% nebulizer solution Inhale 0.5 mLs into the lungs as needed       esomeprazole (NEXIUM) 40 MG delayed release capsule Take 1 capsule by mouth every morning (before breakfast) 90 capsule 3    hydrocortisone (ANUSOL-HC) 2.5 % CREA rectal cream Place rectally 2 times daily 28 g 3    vitamin B-12 (CYANOCOBALAMIN) 1000 MCG tablet Take 1 tablet by mouth once a week 30 tablet 3    aspirin 81 MG EC tablet Take 1 tablet by mouth 2 times daily 30 tablet 3    glipiZIDE (GLUCOTROL) 5 MG tablet Take 2.5 mg by mouth at bedtime From Dr Vicki Howard      Continuous Blood Gluc Sensor (FREESTYLE DAVIS 14 DAY SENSOR) MISC       Multiple Vitamins-Minerals (THERAPEUTIC MULTIVITAMIN-MINERALS) tablet Take 1 tablet by mouth daily       Multiple Vitamins-Minerals (PRESERVISION AREDS PO) Take by mouth daily       Continuous Blood Gluc  (FREESTYLE DAVIS 14 DAY READER) MATTHEW       CRANBERRY PO Take by mouth 2 times daily       No current facility-administered medications for this visit. Facility-Administered Medications Ordered in Other Visits   Medication Dose Route Frequency Provider Last Rate Last Admin    0.9 % sodium chloride infusion 250 mL  250 mL IntraVENous Once David Bautista MD         Allergies   Allergen Reactions    Iodides Anaphylaxis, Hives and Itching     \"Contrast dyes\"  This was added by someone but Patient states has had IVP dyes multiple times without problems and had a myelogram in Dec 2016 without problems    Povidone-Iodine Anaphylaxis, Hives and Swelling    Sulfa Antibiotics Hives and Swelling     Other reaction(s): Unknown  Other reaction(s): Intolerance-unknown  Hands, feet, mouth, eyes  Other reaction(s): Unknown    Betadine [Povidone Iodine] Hives    Cephalexin Hives and Swelling    Cephalexin Itching     Other reaction(s): Hallucinations  In 1969 received demerol and keflex together so was told to list both as allergies    Cozaar [Losartan] Nausea Only     Pt also gets sores on toungue    Cymbalta [Duloxetine Hcl] Diarrhea and Nausea And Vomiting     Weakness    Demerol Hives and Swelling    Doxycycline Other (See Comments)     Sore mouth        Meperidine Itching     Other reaction(s): Hallucinations      Morphine Hives and Itching    Penicillins Hives, Swelling and Itching     Other reaction(s):  Intolerance-unknown  Other reaction(s): Unknown    Zithromax [Azithromycin] Other (See Comments)     Sore mouth      Ciprofloxacin      Pt stated it gave her canker sores    Clindamycin/Lincomycin     Etodolac     Fluorescein     Iodine      Other reaction(s): Intolerance-unknown    Lisinopril      cough    Penicillin G     Soap     Toviaz [Fesoterodine Fumarate Er]     Clonazepam Other (See Comments)     From neuro: made her too sleepy    Metformin And Related Nausea And Vomiting     Diarrhea and N/V       Health Maintenance   Topic Date Due    Annual Wellness Visit (AWV)  Never done    Lipids  09/08/2021    Depression Monitoring  01/23/2024    DTaP/Tdap/Td vaccine (2 - Td or Tdap) 05/12/2032    DEXA (modify frequency per FRAX score)  Completed    Flu vaccine  Completed    Shingles vaccine  Completed    Pneumococcal 65+ years Vaccine  Completed    COVID-19 Vaccine  Completed    Hepatitis A vaccine  Aged Out    Hib vaccine  Aged Out    Meningococcal (ACWY) vaccine  Aged Out       Subjective:      Review of Systems   Constitutional:  Positive for fatigue. Respiratory:  Positive for shortness of breath. Objective:     /76   Pulse 61   Ht 4' 9\" (1.448 m)   Wt 154 lb 12.8 oz (70.2 kg)   SpO2 97%   BMI 33.50 kg/m²   Physical Exam  Vitals and nursing note reviewed. Constitutional:       General: She is not in acute distress. Appearance: She is well-developed. She is not ill-appearing. HENT:      Head: Normocephalic and atraumatic. Right Ear: External ear normal.      Left Ear: External ear normal.   Eyes:      General: No scleral icterus. Right eye: No discharge. Left eye: No discharge. Conjunctiva/sclera: Conjunctivae normal.   Neck:      Thyroid: No thyromegaly. Trachea: No tracheal deviation. Cardiovascular:      Rate and Rhythm: Normal rate and regular rhythm. Heart sounds: Normal heart sounds. Pulmonary:      Effort: Pulmonary effort is normal. No respiratory distress. Breath sounds: Normal breath sounds. No wheezing. Lymphadenopathy:      Cervical: No cervical adenopathy. Skin:     General: Skin is warm. Findings: No rash.    Neurological:      Mental Status: She is alert and oriented to person, place, and time. Psychiatric:         Mood and Affect: Mood normal.         Behavior: Behavior normal.       Assessment:       Diagnosis Orders   1. Shortness of breath  Echocardiogram complete      2. Coronary angioplasty status  Echocardiogram complete      3. Other fatigue        4. Coronary artery disease involving native coronary artery of native heart without angina pectoris  Echocardiogram complete           Plan:      Return in about 3 months (around 5/9/2023) for SOB. She needs to bring in her med list and what med she is taking when    Orders Placed This Encounter   Procedures    Echocardiogram complete     Standing Status:   Future     Standing Expiration Date:   4/10/2023     Order Specific Question:   Reason for exam:     Answer:   CAD, SOB< increased fatigue     No orders of the defined types were placed in this encounter. Patient given educationalmaterials - see patient instructions. Discussed use, benefit, and side effectsof prescribed medications. All patient questions answered. Pt voiced understanding. Reviewed health maintenance. Instructed to continue current medications, diet andexercise. Patient agreed with treatment plan. Follow up as directed.      Electronicallysigned by Polina Oviedo MD on 2/9/2023 at 2:52 PM

## 2023-02-23 DIAGNOSIS — D69.6 THROMBOCYTOPENIA (HCC): Primary | ICD-10-CM

## 2023-02-27 ENCOUNTER — OFFICE VISIT (OUTPATIENT)
Dept: ONCOLOGY | Age: 83
End: 2023-02-27
Payer: MEDICARE

## 2023-02-27 ENCOUNTER — HOSPITAL ENCOUNTER (OUTPATIENT)
Age: 83
Discharge: HOME | End: 2023-02-27
Payer: MEDICARE

## 2023-02-27 VITALS
SYSTOLIC BLOOD PRESSURE: 126 MMHG | WEIGHT: 154.8 LBS | TEMPERATURE: 97.4 F | HEART RATE: 64 BPM | BODY MASS INDEX: 33.5 KG/M2 | DIASTOLIC BLOOD PRESSURE: 74 MMHG

## 2023-02-27 DIAGNOSIS — D69.6 THROMBOCYTOPENIA (HCC): ICD-10-CM

## 2023-02-27 DIAGNOSIS — D69.6 THROMBOCYTOPENIA (HCC): Primary | ICD-10-CM

## 2023-02-27 LAB
ABSOLUTE EOS #: 0.08 K/UL (ref 0–0.4)
ABSOLUTE LYMPH #: 1.37 K/UL (ref 1–4.8)
ABSOLUTE MONO #: 0.23 K/UL (ref 0.1–0.8)
BASOPHILS # BLD: 0 % (ref 0–2)
BASOPHILS ABSOLUTE: 0 K/UL (ref 0–0.2)
EOSINOPHILS RELATIVE PERCENT: 1 % (ref 1–4)
HCT VFR BLD AUTO: 38.4 % (ref 36–46)
HGB BLD-MCNC: 12.7 G/DL (ref 12–16)
LYMPHOCYTES # BLD: 18 % (ref 24–44)
MCH RBC QN AUTO: 29.8 PG (ref 26–34)
MCHC RBC AUTO-ENTMCNC: 33 G/DL (ref 31–37)
MCV RBC AUTO: 90.2 FL (ref 80–100)
MONOCYTES # BLD: 3 % (ref 1–7)
MORPHOLOGY: ABNORMAL
PDW BLD-RTO: 14.3 % (ref 12.5–15.4)
PLATELET # BLD AUTO: 109 K/UL (ref 140–450)
PMV BLD AUTO: 11.8 FL (ref 6–12)
RBC # BLD: 4.26 M/UL (ref 4–5.2)
SEG NEUTROPHILS: 78 % (ref 36–66)
SEGMENTED NEUTROPHILS ABSOLUTE COUNT: 5.92 K/UL (ref 1.8–7.7)
WBC # BLD AUTO: 7.6 K/UL (ref 3.5–11)

## 2023-02-27 PROCEDURE — G8427 DOCREV CUR MEDS BY ELIG CLIN: HCPCS | Performed by: INTERNAL MEDICINE

## 2023-02-27 PROCEDURE — 85025 COMPLETE CBC W/AUTO DIFF WBC: CPT

## 2023-02-27 PROCEDURE — 1036F TOBACCO NON-USER: CPT | Performed by: INTERNAL MEDICINE

## 2023-02-27 PROCEDURE — 99214 OFFICE O/P EST MOD 30 MIN: CPT | Performed by: INTERNAL MEDICINE

## 2023-02-27 PROCEDURE — 3074F SYST BP LT 130 MM HG: CPT | Performed by: INTERNAL MEDICINE

## 2023-02-27 PROCEDURE — 1123F ACP DISCUSS/DSCN MKR DOCD: CPT | Performed by: INTERNAL MEDICINE

## 2023-02-27 PROCEDURE — 99211 OFF/OP EST MAY X REQ PHY/QHP: CPT | Performed by: INTERNAL MEDICINE

## 2023-02-27 PROCEDURE — 1090F PRES/ABSN URINE INCON ASSESS: CPT | Performed by: INTERNAL MEDICINE

## 2023-02-27 PROCEDURE — G8484 FLU IMMUNIZE NO ADMIN: HCPCS | Performed by: INTERNAL MEDICINE

## 2023-02-27 PROCEDURE — G8400 PT W/DXA NO RESULTS DOC: HCPCS | Performed by: INTERNAL MEDICINE

## 2023-02-27 PROCEDURE — 36415 COLL VENOUS BLD VENIPUNCTURE: CPT

## 2023-02-27 PROCEDURE — G8417 CALC BMI ABV UP PARAM F/U: HCPCS | Performed by: INTERNAL MEDICINE

## 2023-02-27 PROCEDURE — 3078F DIAST BP <80 MM HG: CPT | Performed by: INTERNAL MEDICINE

## 2023-02-27 NOTE — PROGRESS NOTES
_           Chief Complaint   Patient presents with    Follow-up     Review status of disease    Discuss Labs     DIAGNOSIS:         Chronic thrombocytopenia. 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 CURRENT THERAPY:         Observation for the above. BRIEF CASE HISTORY:      Ms. Tamika Queen is a very pleasant 81 y.o. female with history of multiple co morbidities as listed. Patient is referred for evaluation of thrombocytopenia. Patient is aware of this hematologic problem for so many years. Patient states that she had problems with thrombocytopenia before moving to [place] in 2007. She never needed any treatment. She was always told to have low platelet counts before surgeries. Never had any complications. Currently stable. No nosebleed or gum bleed. She has chronic easy bruising. No GI bleeding. No hematuria. No vaginal bleeding. No fever or night sweats. No enlarged lymph nodes. No weight loss or decreased appetite. No other complaints. Patient denies smoking or alcohol drinking. .   INTERIM HISTORY:   Seen for follow up thrombocytopenia. Clinically stable. She had recent cath and started on ASA and plavix. No bruises. No active bleeding. No fever.     PAST MEDICAL HISTORY: has a past medical history of Age-related cognitive decline, Ankle pain, Anxiety, B12 deficiency, Winters esophagus, 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HCC), Caffeine use, Chronic back pain, Chronic ITP (idiopathic thrombocytopenia) (HCC), CVA (cerebral infarction), Diabetic gastroparesis ([address] Diverticular disease, GERD (gastroesophageal reflux disease), Hiatal hernia, Hip fracture ([address] History of blood transfusion, History of decreased platelet count, Hyperlipemia, Hypertension, Internal hemorrhoid, Macular degeneration of left eye, Nausea and vomiting, Neuropathy, Osteoarthritis, Ringing in ears, Self-catheterizes urinary bladder, TIA (transient ischemic attack), Tubular adenoma, Type II or unspecified type diabetes mellitus without mention of complication, not stated as uncontrolled, Urinary incontinence, Wears dentures, and Wears glasses. PAST SURGICAL HISTORY: has a past surgical history that includes bladder suspension (2002); Hysterectomy (1969); Tonsillectomy (1978); Appendectomy (1962); Spine surgery (2010); colostomy (1986); Revision Colostomy (1986); Spine surgery (1990); Upper gastrointestinal endoscopy; Bladder surgery; Upper gastrointestinal endoscopy (05/05/2014); cardiovascular stress test (12/2014); Colon surgery; Total abdominal hysterectomy w/ bilateral salpingoophorectomy; fracture surgery (Right, 2011); fracture surgery (Left, 2001); fracture surgery (Left, 2013); Cardiac catheterization (2008); Revision total knee arthroplasty (Right, 10/27/2015); Colonoscopy (04/07/2016); Upper gastrointestinal endoscopy (04/07/2016); lumbar fusion (2017); Cystocopy (09/08/2017); Cataract removal with implant (Left, 11/07/2017); pr xcapsl ctrc rmvl insj io lens prosth w/o ecp (Left, 11/07/2017); Cataract removal with implant (Right, 11/28/2017); pr xcapsl ctrc rmvl insj io lens prosth w/o ecp (Right, 11/28/2017); Upper gastrointestinal endoscopy (N/A, 07/17/2018); joint replacement (Bilateral, 2000); hernia repair; Colonoscopy (N/A, 11/06/2019); Revision total knee arthroplasty (Left, 07/14/2020); Colonoscopy (11/06/2019); and Coronary angioplasty with stent (08/04/2022).      CURRENT MEDICATIONS:  has a current medication list which includes the following prescription(s): ranolazine, primidone, ropinirole, nystatin-triamcinolone, nystatin, isosorbide mononitrate, estradiol, nitrofurantoin (macrocrystal-monohydrate), clopidogrel, nitroglycerin, rectiv, metoprolol succinate, lidocaine, livalo, acetaminophen, lubiprostone, lidocaine, albuterol, esomeprazole, hydrocortisone, vitamin b-12, aspirin, freestyle paola 14 day sensor, therapeutic multivitamin-minerals, multiple vitamins-minerals, freestyle paola 14 day reader, cranberry, and d-mannose, and the following Facility-Administered Medications: 0.9 % sodium chloride. ALLERGIES:  is allergic to iodides, povidone-iodine, sulfa antibiotics, betadine [povidone iodine], cephalexin, cephalexin, cozaar [losartan], cymbalta [duloxetine hcl], demerol, doxycycline, meperidine, morphine, penicillins, zithromax [azithromycin], ciprofloxacin, clindamycin/lincomycin, etodolac, fluorescein, iodine, lisinopril, penicillin g, soap, toviaz [fesoterodine fumarate er], clonazepam, and metformin and related. FAMILY HISTORY: Multiple family numbers with cancer including mother who had breast cancer at young age. Otherwise negative for any hematological or oncological conditions. SOCIAL HISTORY:  reports that she quit smoking about 40 years ago. Her smoking use included cigarettes. She has a 15.00 pack-year smoking history. She quit smokeless tobacco use about 40 years ago. She reports that she does not drink alcohol and does not use drugs. REVIEW OF SYSTEMS:     General: No weakness or fatigue. No unanticipated weight loss or decreased appetite. No fever or chills. Eyes: No blurred vision, eye pain or double vision. Ears: No hearing problems or drainage. No tinnitus. Throat: No sore throat, problems with swallowing or dysphagia. Respiratory: No cough, sputum or hemoptysis. No shortness of breath. No pleuritic chest pain. Cardiovascular: No chest pain, orthopnea or PND. No lower extremity edema. No palpitation. Gastrointestinal: No problems with swallowing. No abdominal pain or bloating. No nausea or vomiting. No diarrhea or constipation. No GI bleeding. Genitourinary: No dysuria, hematuria, frequency or urgency. Musculoskeletal: No muscle aches or pains. No limitation of movement. No back pain. No gait disturbance, No joint complaints. Dermatologic: No skin rashes or pruritus. No skin lesions or discolorations. Psychiatric: No depression, anxiety, or stress or signs of schizophrenia. No change in mood or affect. Hematologic: No history of bleeding tendency. No bruises or ecchymosis. No history of clotting problems. Infectious disease: No fever, chills or frequent infections. Endocrine: No polydipsia or polyuria. No temperature intolerance. Neurologic: No headaches or dizziness. No weakness or numbness of the extremities. No changes in balance, coordination,  memory, mentation, behavior. Allergic/Immunologic: No nasal congestion or hives. No repeated infections. PHYSICAL EXAM:  The patient is not in acute distress. Vital signs: Blood pressure 126/74, pulse 64, temperature 97.4 °F (36.3 °C), temperature source Temporal, weight 154 lb 12.8 oz (70.2 kg), not currently breastfeeding. General appearance - well appearing, not in pain or distress  Mental status - good mood, alert and oriented  Eyes - pupils equal and reactive, extraocular eye movements intact  Ears - bilateral TM's and external ear canals normal  Nose - normal and patent, no erythema, discharge or polyps  Mouth - mucous membranes moist, pharynx normal without lesions  Neck - supple, no significant adenopathy  Lymphatics - no palpable lymphadenopathy, no hepatosplenomegaly  Chest - clear to auscultation, no wheezes, rales or rhonchi, symmetric air entry  Heart - normal rate, regular rhythm, normal S1, S2, no murmurs, rubs, clicks or gallops  Abdomen - soft, nontender, nondistended, no masses or organomegaly  Neurological -patient has tremors.   Alert, oriented, normal speech, no focal findings or movement disorder noted  Musculoskeletal - no joint tenderness, deformity or swelling  Extremities - peripheral pulses normal, no pedal edema, no clubbing or cyanosis  Skin - normal coloration and turgor, no rashes, no suspicious skin lesions noted     Review of Diagnostic data:   Lab Results   Component Value Date    WBC 7.6 02/27/2023    HGB 12.7 02/27/2023    HCT 38.4 02/27/2023    MCV 90.2 02/27/2023     (L) 02/27/2023       Chemistry        Component Value Date/Time     01/13/2023 1445    K 4.7 01/13/2023 1445     01/13/2023 1445    CO2 24 01/13/2023 1445    BUN 25 (H) 01/13/2023 1445    CREATININE 0.89 01/13/2023 1445        Component Value Date/Time    CALCIUM 10.2 01/13/2023 1445    ALKPHOS 69 01/13/2023 1445    AST 16 01/13/2023 1445    ALT 18 01/13/2023 1445    BILITOT 0.5 01/13/2023 1445            IMPRESSION:   Chronic thrombocytopenia. 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 PLAN: I reviewed the labs as above and discussed with the patient. I explained to the patient the nature of this hematologic problem. I explained the significance of these abnormalities in layman language. Patient is having chronic thrombocytopenia for so many years. More than 15 years. Platelets were above 100 most of the time. Clinically no symptoms related to thrombocytopenia. Never needed treatment. Patient was evaluated by hematology in the past.  She had diagnosis of chronic ITP. At this juncture with platelets being stable above 100 with no symptoms I do not believe there is a need for treatment but rather monitoring and observation. We will continue  observation with CBC every 6 months would be quite reasonable. OK for ASA and plavix. Patient's questions were answered to the best of her satisfaction and she verbalized full understanding and agreement.

## 2023-03-01 ENCOUNTER — HOSPITAL ENCOUNTER (OUTPATIENT)
Age: 83
Setting detail: SPECIMEN
Discharge: HOME | End: 2023-03-01

## 2023-03-01 ENCOUNTER — OFFICE VISIT (OUTPATIENT)
Dept: INFECTIOUS DISEASES | Age: 83
End: 2023-03-01
Payer: MEDICARE

## 2023-03-01 ENCOUNTER — TELEPHONE (OUTPATIENT)
Dept: ONCOLOGY | Age: 83
End: 2023-03-01

## 2023-03-01 VITALS
BODY MASS INDEX: 33.22 KG/M2 | WEIGHT: 154 LBS | OXYGEN SATURATION: 95 % | RESPIRATION RATE: 15 BRPM | HEIGHT: 57 IN | SYSTOLIC BLOOD PRESSURE: 132 MMHG | HEART RATE: 70 BPM | DIASTOLIC BLOOD PRESSURE: 70 MMHG | TEMPERATURE: 97.3 F

## 2023-03-01 DIAGNOSIS — N39.0 RECURRENT UTI: Primary | ICD-10-CM

## 2023-03-01 DIAGNOSIS — N39.0 RECURRENT UTI: ICD-10-CM

## 2023-03-01 LAB
AMORPHOUS: ABNORMAL
BILIRUBIN URINE: NEGATIVE
COLOR: ABNORMAL
EPITHELIAL CELLS UA: ABNORMAL /HPF (ref 0–5)
GLUCOSE UR STRIP.AUTO-MCNC: ABNORMAL MG/DL
KETONES UR STRIP.AUTO-MCNC: ABNORMAL MG/DL
LEUKOCYTE ESTERASE UR QL STRIP.AUTO: ABNORMAL
NITRITE UR QL STRIP.AUTO: NEGATIVE
PROT UR STRIP.AUTO-MCNC: 5 MG/DL (ref 5–8)
PROT UR STRIP.AUTO-MCNC: ABNORMAL MG/DL
RBC CLUMPS #/AREA URNS AUTO: ABNORMAL /HPF (ref 0–2)
SPECIFIC GRAVITY UA: 1.02 (ref 1–1.03)
TURBIDITY: ABNORMAL
URINE HGB: ABNORMAL
UROBILINOGEN, URINE: NORMAL
WBC UA: ABNORMAL /HPF (ref 0–5)
YEAST: ABNORMAL

## 2023-03-01 PROCEDURE — 3078F DIAST BP <80 MM HG: CPT | Performed by: INTERNAL MEDICINE

## 2023-03-01 PROCEDURE — 3075F SYST BP GE 130 - 139MM HG: CPT | Performed by: INTERNAL MEDICINE

## 2023-03-01 PROCEDURE — G8400 PT W/DXA NO RESULTS DOC: HCPCS | Performed by: INTERNAL MEDICINE

## 2023-03-01 PROCEDURE — 1123F ACP DISCUSS/DSCN MKR DOCD: CPT | Performed by: INTERNAL MEDICINE

## 2023-03-01 PROCEDURE — G8427 DOCREV CUR MEDS BY ELIG CLIN: HCPCS | Performed by: INTERNAL MEDICINE

## 2023-03-01 PROCEDURE — 1036F TOBACCO NON-USER: CPT | Performed by: INTERNAL MEDICINE

## 2023-03-01 PROCEDURE — 99214 OFFICE O/P EST MOD 30 MIN: CPT | Performed by: INTERNAL MEDICINE

## 2023-03-01 PROCEDURE — 1090F PRES/ABSN URINE INCON ASSESS: CPT | Performed by: INTERNAL MEDICINE

## 2023-03-01 PROCEDURE — G8417 CALC BMI ABV UP PARAM F/U: HCPCS | Performed by: INTERNAL MEDICINE

## 2023-03-01 PROCEDURE — G8484 FLU IMMUNIZE NO ADMIN: HCPCS | Performed by: INTERNAL MEDICINE

## 2023-03-01 RX ORDER — FLUCONAZOLE 100 MG/1
200 TABLET ORAL DAILY
Qty: 14 TABLET | Refills: 0 | Status: SHIPPED | OUTPATIENT
Start: 2023-03-01 | End: 2023-03-08

## 2023-03-01 NOTE — TELEPHONE ENCOUNTER
AVS from 2/27/23      CBC q 6 months  RV 6 months      Rv scheduled for 8/28 @ 10:15 am     Pt was given AVS and appointment schedule      Electronically signed by Frederic Anthony on 3/1/2023 at 8:17 AM

## 2023-03-01 NOTE — PROGRESS NOTES
Infectious disease Consult Note      Patient: Virgil Figueroa  : 1940  Acct#:  [de-identified]     Date:  3/1/2023    Subjective:       History of Present Illness  Patient is a 81 y.o.  female   Chief Complaint   Patient presents with    Follow-up     Uti is not resolving bw/ macrobid   The patient was referred by Dr. Roz Aranda for recurrent UTIs  The patient had urinary retention does straight cath for years. She is on Myrbetriq      She had multiple antibiotics allergy include sulfa, cephalexin, penicillin, clindamycin and Zithromax. Previous urine culture on 2021, 2021 and 2021 grew yeast, not Candida albicans or Candida DUBLINIENSIS  Urine culture on 3 /29/21 and 2021 grew E. Coli  History of chronic ITP, CVA, hypertension, hyperlipidemia, diabetes mellitus, diabetic gastroparesis. Interstim sacral neuromodulation device  please placed 12/10/2013 removed 21 status post Botox 300 IU 2021. She had cystoscopy done  She is up-to-date on Covid 19 vaccine and flu  Interval history 2021  She is complaining of frequency with urination and dysuria  She is finishing a course of Diflucan and Levaquin. Urine culture on 2021 grew   YEAST, NOT CANDIDA ALBICANS OR CANDIDA DUBLINIENSIS >876708 CFU/ML Abnormal      Interval history 2022    The patient was treated with high-dose Diflucan 400 daily for 7 days last month. She is feeling well today, denied urinary symptoms, no frequency or burning, still use straight cath, denied abdominal pain, no fever or chills, no other complaints. She is on estradiol cream, d-mannose and cranberry pills. Interval history 2022  The patient is complaining of urgency, dysuria, burning with urination and vaginal itching started around 10 days ago after she was started on oral Cipro for left leg cellulitis.   She has been doing Straight cath    Interval history 2022  The patient is feeling better, denied urinary symptoms, no vaginal itching or discharge,, denied fever or chills. She is complaining of intermittent right flank pain. 6/8/2022 urine culture was negative  She completed a course of Diflucan  Interval history 2/8/2023  The patient had urinary incontinence, doing straight cath, denied burning, denied change in the urine color or smell, denied fever or chills, no other complaints. Renal ultrasound on 6/22/2022 showed mild right hydronephrosis  The patient has been on Macrobid for prophylaxis . Last urine culture on 1/23/2023 grew E. coli that was sensitive to Macrobid  Interval history 3/1/2023  She is complaining of dysuria, frequency, pain with removing her straight cath and cloudy urine. She also had intermittent bladder spasm, denied fever or chills, no nausea or vomiting, no other complaints. The patient has been taking Macrobid for prophylaxis. Past Medical History:   Diagnosis Date    Age-related cognitive decline     Ankle pain     Left ankle fracture in ortho boat.     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    left with urinary incontinenc post surgical    Caffeine use     2 coffee/day, 1-2 tea per week    Chronic back pain     Chronic ITP (idiopathic thrombocytopenia) (HCC)     CVA (cerebral infarction) 2008, 2010    balance issues, short term memory loss    Diabetic gastroparesis ([address]     Diverticular disease 1986    GERD (gastroesophageal reflux disease)     Hiatal hernia     Hip fracture (HCC)     History of blood transfusion     History of decreased platelet count     Hyperlipemia     Hypertension     Internal hemorrhoid     Macular degeneration of left eye 1980's    S/P laser treatment    Nausea and vomiting     Neuropathy     Osteoarthritis     Ringing in ears     Self-catheterizes urinary bladder     6-7 times daily    TIA (transient ischemic attack) 2000    x1    Tubular adenoma     colon polyps    Type II or unspecified type diabetes mellitus without mention of complication, not stated as uncontrolled     Urinary incontinence     frequent UTI s/p infection, surgical dilatation lead to incontinence    Wears dentures     full on top, partial on bottom    Wears glasses       Past Surgical History:   Procedure Laterality Date    [address] [facility]      bladder stimulator    BLADDER SUSPENSION  2002    multiple    CARDIAC CATHETERIZATION  2008    no stents    CARDIOVASCULAR STRESS TEST  12/2014    CATARACT REMOVAL WITH IMPLANT Left 11/07/2017    [facility]    CATARACT REMOVAL WITH IMPLANT Right 11/28/2017    [facility]    COLON SURGERY      colostomy reversal    COLONOSCOPY  04/07/2016    tubular adenoma x3; internal hemorrhoids    COLONOSCOPY N/A 11/06/2019    COL[facility]ST[facility]  11/06/2019    COLOSTOMY  1986    bowel obstruction/ rupture from diverticular disease, reversal 3 mos later    CORONARY ANGIOPLASTY WITH STENT PLACEMENT  08/04/2022    CYSTOSCOPY  09/08/2017    W/ 200IU Botox     FRACTURE SURGERY Right 2011    hip    FRACTURE SURGERY Left 2001    WRIST    FRACTURE SURGERY Left 2013    ankle    HERNIA REPAIR      HYSTERECTOMY (CERVIX STATUS UNKNOWN)  [address] [facility] [address] Bilateral 2000    BILAT KNEES    LUMBAR FUSION  2017    L2/L3    MI XCAPSL CTRC RMVL INSJ IO LENS PROSTH W/O ECP Left 11/07/2017    EYE CATARACT EMULSIFICATION IOL IMPLANT performed by Barry Hernandez MD at [address] W/O ECP Right 11/28/2017    EYE CATARACT EMULSIFICATION IOL IMPLANT performed by Barry Hernandez MD at [address] [facility] Right 10/27/2015    WITH POLY EXCHANGE BIOMET AND GPS APPLICATION    REVISION TOTAL KNEE ARTHROPLASTY Left 07/14/2020    KNEE TOTAL ARTHROPLASTY REVISION - POLY EXCHANGE performed by Miya Queen MD at [address] [place] [address] fusion, did not take    [address]    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      TONSILLECTOMY  1978    UPPER GASTROINTESTINAL ENDOSCOPY      UPPER GASTROINTESTINAL ENDOSCOPY  05/05/2014    UPPER GASTROINTESTINAL ENDOSCOPY  04/07/2016    mild gastritis    UPPER GASTROINTESTINAL ENDOSCOPY N/A 07/17/2018    retained thick secretions in proximal esophagus          Admission Meds  Current Outpatient Medications on File Prior to Visit   Medication Sig Dispense Refill    ranolazine (RANEXA) 500 MG extended release tablet Take 500 mg by mouth 2 times daily      primidone (MYSOLINE) 50 MG tablet Take 1 tablet by mouth nightly 180 tablet 3    rOPINIRole (REQUIP) 4 MG tablet Take 1 tablet by mouth nightly 30 tablet 1    nystatin-triamcinolone (MYCOLOG II) 006803-4.1 UNIT/GM-% cream Apply topically 4 times daily Apply topically 4 times daily. 30 g 3    nystatin (MYCOSTATIN) 126610 UNIT/GM powder Apply 3 times daily.  45 g 3    isosorbide mononitrate (IMDUR) 30 MG extended release tablet       estradiol (ESTRACE) 0.1 MG/GM vaginal cream Place 2 g vaginally daily      nitrofurantoin, macrocrystal-monohydrate, (MACROBID) 100 MG capsule TAKE 1 CAPSULE BY MOUTH EVERY DAY 30 capsule 5    clopidogrel (PLAVIX) 75 MG tablet Take 75 mg by mouth daily      nitroGLYCERIN (NITROSTAT) 0.4 MG SL tablet 1 under the tongue as needed for angina, may repeat q5mins for up three doses      RECTIV 0.4 % rectal ointment       metoprolol succinate (TOPROL XL) 25 MG extended release tablet Take 12.5 mg by mouth daily      lidocaine (XYLOCAINE) 5 % ointment       LIVALO 2 MG TABS tablet TAKE 1 TABLET BY MOUTH IN THE EVENING 30 tablet 5    acetaminophen (TYLENOL) 500 MG tablet Take 2 tablets by mouth every 6 hours as needed for Pain 60 tablet 0    lubiprostone (AMITIZA) 8 MCG CAPS capsule Take 1 capsule by mouth daily 30 capsule 1    lidocaine (XYLOCAINE) 2 % jelly APPLY TOPICALLY AS NEEDED      albuterol (PROVENTIL) (5 MG/ML) 0.5% nebulizer solution Inhale 0.5 mLs into the lungs as needed       esomeprazole (NEXIUM) 40 MG delayed release capsule Take 1 capsule by mouth every morning (before breakfast) 90 capsule 3    hydrocortisone (ANUSOL-HC) 2.5 % CREA rectal cream Place rectally 2 times daily 28 g 3    vitamin B-12 (CYANOCOBALAMIN) 1000 MCG tablet Take 1 tablet by mouth once a week 30 tablet 3    aspirin 81 MG EC tablet Take 1 tablet by mouth 2 times daily 30 tablet 3    Continuous Blood Gluc Sensor (FREESTYLE DAVIS 14 DAY SENSOR) MISC       Multiple Vitamins-Minerals (THERAPEUTIC MULTIVITAMIN-MINERALS) tablet Take 1 tablet by mouth daily       Multiple Vitamins-Minerals (PRESERVISION AREDS PO) Take by mouth daily       Continuous Blood Gluc  (FREESTYLE DAVIS 14 DAY READER) MATTHEW       CRANBERRY PO Take by mouth 2 times daily      D-Mannose 350 MG CAPS Take 300 mg by mouth daily 30 capsule 3     Current Facility-Administered Medications on File Prior to Visit   Medication Dose Route Frequency Provider Last Rate Last Admin    0.9 % sodium chloride infusion 250 mL  250 mL IntraVENous Once Thom Elder MD               Allergies  Allergies   Allergen Reactions    Iodides Anaphylaxis, Hives and Itching     \"Contrast dyes\"  This was added by someone but Patient states has had IVP dyes multiple times without problems and had a myelogram in Dec 2016 without problems    Povidone-Iodine Anaphylaxis, Hives and Swelling    Sulfa Antibiotics Hives and Swelling     Other reaction(s): Unknown  Other reaction(s):  Intolerance-unknown  Hands, feet, mouth, eyes  Other reaction(s): Unknown    Betadine [Povidone Iodine] Hives    Cephalexin Hives and Swelling    Cephalexin Itching     Other reaction(s): Hallucinations  In 1969 received demerol and keflex together so was told to list both as allergies    Cozaar [Losartan] Nausea Only     Pt also gets sores on toungue    Cymbalta [Duloxetine Hcl] Diarrhea and Nausea And Vomiting     Weakness    Demerol Hives and Swelling    Doxycycline Other (See Comments)     Sore mouth        Meperidine Itching     Other reaction(s): Hallucinations      Morphine Hives and Itching    Penicillins Hives, Swelling and Itching     Other reaction(s): Intolerance-unknown  Other reaction(s): Unknown    Zithromax [Azithromycin] Other (See Comments)     Sore mouth      Ciprofloxacin      Pt stated it gave her canker sores    Clindamycin/Lincomycin     Etodolac     Fluorescein     Iodine      Other reaction(s): Intolerance-unknown    Lisinopril      cough    Penicillin G     Soap     Toviaz [Fesoterodine Fumarate Er]     Clonazepam Other (See Comments)     From neuro: made her too sleepy    Metformin And Related Nausea And Vomiting     Diarrhea and N/V        Social   Social History     Tobacco Use    Smoking status: Former     Packs/day: 0.50     Years: 30.00     Pack years: 15.00     Types: Cigarettes     Quit date: 1982     Years since quittin.7    Smokeless tobacco: Former     Quit date: 1982   Substance Use Topics    Alcohol use: No             Family History   Problem Relation Age of Onset    Breast Cancer Mother     Cancer Mother         breast and uterine  39    Heart Disease Father     Heart Attack Father          28    Cancer Maternal Grandmother     Cancer Brother 46        bronchogenic adenocarcinoma cancer    Lung Cancer Brother     Cancer Sister         lung    Lung Cancer Sister          72    Breast Cancer Sister          62    Cancer Sister         breast          Review of Systems    Other than above 12 systems reviewed were negative .              Physical Exam  /70   Pulse 70   Temp 97.3 °F (36.3 °C)   Resp 15   Ht 4' 9\" (1.448 m)   Wt 154 lb (69.9 kg)   SpO2 95%   BMI 33.33 kg/m²           General Appearance: alert and oriented to person, place and time, well-developed and well-nourished, in no acute distress  Skin: warm and dry, no rash or erythema  Head: normocephalic and atraumatic  Eyes: pupils equal, round, and reactive to light, extraocular eye movements intact, conjunctivae normal  ENT: hearing grossly normal bilaterally. Neck: neck supple and non tender . Pulmonary/Chest: clear to auscultation bilaterally- no wheezes, rales or rhonchi, normal air movement, no respiratory distress  Cardiovascular: normal rate, regular rhythm, normal S1 and S2, no murmurs.   Abdomen: soft, non-tender, non-distended, normal bowel sounds, no masses or organomegaly  Extremities: no cyanosis, clubbing or edema  Musculoskeletal: normal range of motion, no joint swelling, deformity or tenderness  Neurologic: no cranial nerve deficit and muscle strength normal    Data Review:    WBC   Date Value Ref Range Status   02/27/2023 7.6 3.5 - 11.0 k/uL Final   01/13/2023 7.3 3.5 - 11.0 k/uL Final   03/20/2022 4.5 3.5 - 11.0 k/uL Final     Hemoglobin   Date Value Ref Range Status   02/27/2023 12.7 12.0 - 16.0 g/dL Final   01/13/2023 13.2 12.0 - 16.0 g/dL Final   03/20/2022 12.3 12.0 - 16.0 g/dL Final     Hematocrit   Date Value Ref Range Status   02/27/2023 38.4 36 - 46 % Final   01/13/2023 40.3 36 - 46 % Final   03/20/2022 37.4 36 - 46 % Final     MCV   Date Value Ref Range Status   02/27/2023 90.2 80 - 100 fL Final   01/13/2023 90.3 80 - 100 fL Final   03/20/2022 88.7 80 - 100 fL Final     Platelets   Date Value Ref Range Status   02/27/2023 109 (L) 140 - 450 k/uL Final   01/13/2023 124 (L) 150 - 450 k/uL Final   03/20/2022 134 (L) 150 - 450 k/uL Final     Sodium   Date Value Ref Range Status   01/13/2023 139 135 - 144 mmol/L Final   03/20/2022 136 135 - 144 mmol/L Final   07/28/2021 140 135 - 144 mmol/L Final     Potassium   Date Value Ref Range Status   01/13/2023 4.7 3.7 - 5.3 mmol/L Final   03/20/2022 3.6 (L) 3.7 - 5.3 mmol/L Final   07/28/2021 4.6 3.7 - 5.3 mmol/L Final     Chloride   Date Value Ref Range Status   01/13/2023 104 98 - 107 mmol/L Final 03/20/2022 101 98 - 107 mmol/L Final   07/28/2021 107 98 - 107 mmol/L Final     CO2   Date Value Ref Range Status   01/13/2023 24 20 - 31 mmol/L Final   03/20/2022 21 20 - 31 mmol/L Final   07/28/2021 20 20 - 31 mmol/L Final     Phosphorus   Date Value Ref Range Status   08/24/2018 3.5 2.6 - 4.5 mg/dL Final     BUN   Date Value Ref Range Status   01/13/2023 25 (H) 8 - 23 mg/dL Final   03/20/2022 19 8 - 23 mg/dL Final   07/28/2021 22 8 - 23 mg/dL Final     Creatinine   Date Value Ref Range Status   01/13/2023 0.89 0.50 - 0.90 mg/dL Final   06/07/2022 0.77 0.50 - 0.90 mg/dL Final   03/20/2022 0.72 0.50 - 0.90 mg/dL Final     AST   Date Value Ref Range Status   01/13/2023 16 <32 U/L Final   03/20/2022 17 <32 U/L Final   07/28/2021 17 <32 U/L Final     ALT   Date Value Ref Range Status   01/13/2023 18 5 - 33 U/L Final   03/20/2022 16 5 - 33 U/L Final   07/28/2021 18 5 - 33 U/L Final     Total Bilirubin   Date Value Ref Range Status   01/13/2023 0.5 0.3 - 1.2 mg/dL Final   03/20/2022 0.54 0.3 - 1.2 mg/dL Final   07/28/2021 0.58 0.3 - 1.2 mg/dL Final     Alkaline Phosphatase   Date Value Ref Range Status   01/13/2023 69 35 - 104 U/L Final   03/20/2022 77 35 - 104 U/L Final   07/28/2021 70 35 - 104 U/L Final     Lipase   Date Value Ref Range Status   01/06/2017 41 13 - 60 U/L Final     Comment:     [facility] [address] [facility], [address] [place] [address] [phone]   03/12/2016 15 13 - 60 U/L Final     Comment:     Performed at [place] [address]   [phone]       Amylase   Date Value Ref Range Status   01/06/2017 47 28 - 100 U/L Final     Comment:     [facility] [address] [facility], [address] [place] [address] [phone]   03/12/2016 18 (L) 28 - 100 U/L Final     Comment:     Performed at [address] [place]  [address]   [phone]       Protime   Date Value Ref Range Status   03/20/2022 13.1 11.8 - 14.6 sec Final   01/20/2022 13.5 11.8 - 14.6 sec Final   02/21/2017 10.8 9.7 - 12.0 sec Final     INR   Date Value Ref Range Status   03/20/2022 1.0  Final     Comment:           Non-therapeutic Range:     INR = 0.9-1.2  Therapeutic Range: Moderate Anticoagulant Intensity:     INR = 2.0-3.0   High Anticoagulant Intensity:     INR = 2.5-3.5           01/20/2022 1.0  Final     Comment:           Non-therapeutic Range:     INR = 0.9-1.2  Therapeutic Range: Moderate Anticoagulant Intensity:     INR = 2.0-3.0   High Anticoagulant Intensity:     INR = 2.5-3.5           02/21/2017 1.0  Final     Comment:           Non-therapeutic Range:     INR = 0.9-1.2  Therapeutic Range: Moderate Anticoagulant Intensity:     INR = 2.0-3.0   High Anticoagulant Intensity:     INR = 2.5-3.5        Performed at [facility]: JUDD MERINO [address] [place] [address]. [address]   [phone]       No results found for: PTT  No results found for: OCCULTBLD  No results found for: GLUMET     Imaging Studies:                           All appropriate imaging studies and reports reviewed: Yes  No results found. Assessment:     Recurrent UTIs   History of chronic ITP, CVA, hypertension, hyperlipidemia, diabetes mellitus, diabetic gastroparesis. Recommendations:   Hold Macrobid for now  Repeat UA with reflex to culture  P.o. Diflucan pending culture results  Follow urine culture and adjust antibiotics as needed. Orders Placed This Encounter   Procedures    Urinalysis with Reflex to Culture     Standing Status:   Future     Standing Expiration Date:   3/1/2024     Order Specific Question:   SPECIFY(EX-CATH,MIDSTREAM,CYSTO,ETC)? Answer:   bianka trivedi           Thank you for allowing me to participate in the care of your patient. Please feel free to contact me with any questions or concerns.      Jennifer Peterson MD

## 2023-03-02 LAB
MICROORGANISM SPEC CULT: NO GROWTH
SPECIMEN DESCRIPTION: NORMAL

## 2023-03-10 ENCOUNTER — HOSPITAL ENCOUNTER (OUTPATIENT)
Dept: NON INVASIVE DIAGNOSTICS | Age: 83
Discharge: HOME | End: 2023-03-10
Payer: MEDICARE

## 2023-03-10 DIAGNOSIS — R06.02 SHORTNESS OF BREATH: ICD-10-CM

## 2023-03-10 DIAGNOSIS — I25.10 CORONARY ARTERY DISEASE INVOLVING NATIVE CORONARY ARTERY OF NATIVE HEART WITHOUT ANGINA PECTORIS: ICD-10-CM

## 2023-03-10 DIAGNOSIS — Z98.61 CORONARY ANGIOPLASTY STATUS: ICD-10-CM

## 2023-03-10 LAB
LV EF: 55 %
LVEF MODALITY: NORMAL

## 2023-03-10 PROCEDURE — 93306 TTE W/DOPPLER COMPLETE: CPT

## 2023-03-15 ENCOUNTER — APPOINTMENT (OUTPATIENT)
Dept: CT IMAGING | Age: 83
End: 2023-03-15
Payer: MEDICARE

## 2023-03-15 ENCOUNTER — HOSPITAL ENCOUNTER (EMERGENCY)
Age: 83
Discharge: HOME | End: 2023-03-15
Attending: STUDENT IN AN ORGANIZED HEALTH CARE EDUCATION/TRAINING PROGRAM
Payer: MEDICARE

## 2023-03-15 ENCOUNTER — APPOINTMENT (OUTPATIENT)
Dept: GENERAL RADIOLOGY | Age: 83
End: 2023-03-15
Payer: MEDICARE

## 2023-03-15 VITALS
WEIGHT: 152 LBS | OXYGEN SATURATION: 97 % | BODY MASS INDEX: 32.79 KG/M2 | HEIGHT: 57 IN | RESPIRATION RATE: 18 BRPM | HEART RATE: 60 BPM | DIASTOLIC BLOOD PRESSURE: 80 MMHG | SYSTOLIC BLOOD PRESSURE: 130 MMHG | TEMPERATURE: 97.5 F
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
## 2023-03-15 DIAGNOSIS — S09.90XA CLOSED HEAD INJURY, INITIAL ENCOUNTER: ICD-10-CM

## 2023-03-15 DIAGNOSIS — S80.00XA CONTUSION OF KNEE, UNSPECIFIED LATERALITY, INITIAL ENCOUNTER: ICD-10-CM

## 2023-03-15 PROCEDURE — 73562 X-RAY EXAM OF KNEE 3: CPT

## 2023-03-15 PROCEDURE — 70450 CT HEAD/BRAIN W/O DYE: CPT

## 2023-03-15 PROCEDURE — 6370000000 HC RX 637 (ALT 250 FOR IP): Performed by: STUDENT IN AN ORGANIZED HEALTH CARE EDUCATION/TRAINING PROGRAM

## 2023-03-15 PROCEDURE — 99284 EMERGENCY DEPT VISIT MOD MDM: CPT

## 2023-03-15 PROCEDURE — 73502 X-RAY EXAM HIP UNI 2-3 VIEWS: CPT

## 2023-03-15 PROCEDURE — 72192 CT PELVIS W/O DYE: CPT

## 2023-03-15 PROCEDURE — 72125 CT NECK SPINE W/O DYE: CPT

## 2023-03-15 RX ORDER — ACETAMINOPHEN 500 MG
1000 TABLET ORAL ONCE
Status: COMPLETED | OUTPATIENT
Start: 2023-03-15 | End: 2023-03-15

## 2023-03-15 RX ADMIN — ACETAMINOPHEN 1000 MG: 500 TABLET ORAL at 20:19

## 2023-03-15 ASSESSMENT — PAIN DESCRIPTION - DESCRIPTORS: DESCRIPTORS: SHOOTING

## 2023-03-15 ASSESSMENT — ENCOUNTER SYMPTOMS
RHINORRHEA: 0
DIARRHEA: 0
ABDOMINAL PAIN: 0
SORE THROAT: 0
VOMITING: 0
NAUSEA: 0
EYE REDNESS: 0
EYE DISCHARGE: 0
SHORTNESS OF BREATH: 0

## 2023-03-15 ASSESSMENT — PAIN DESCRIPTION - ORIENTATION: ORIENTATION: MID

## 2023-03-15 ASSESSMENT — PAIN - FUNCTIONAL ASSESSMENT
PAIN_FUNCTIONAL_ASSESSMENT: NONE - DENIES PAIN
PAIN_FUNCTIONAL_ASSESSMENT: 0-10

## 2023-03-15 ASSESSMENT — PAIN SCALES - GENERAL
PAINLEVEL_OUTOF10: 7
PAINLEVEL_OUTOF10: 8
PAINLEVEL_OUTOF10: 8

## 2023-03-15 ASSESSMENT — LIFESTYLE VARIABLES
HOW MANY STANDARD DRINKS CONTAINING ALCOHOL DO YOU HAVE ON A TYPICAL DAY: PATIENT DOES NOT DRINK
HOW OFTEN DO YOU HAVE A DRINK CONTAINING ALCOHOL: NEVER

## 2023-03-15 ASSESSMENT — PAIN DESCRIPTION - LOCATION
LOCATION: HEAD;NECK
LOCATION: NECK
LOCATION: NECK;BACK

## 2023-03-15 NOTE — ED TRIAGE NOTES
Mode of arrival (squad #, walk in, police, etc) : Walk in        Chief complaint(s): Fall, Knee Pain, Neck Pain        Arrival Note (brief scenario, treatment PTA, etc). : Patient to ED complaining of pain on bilateral knees, forehead and occipital, and neck after suffering a fall today. Patient states her head the side of her car door, and her knees hit the floor. No LOC. Patient currently on aspirin and Plavix. Patient picked up off the floor and attempted to attend [employer] afterward, but had to come in after an hour due to pain. Has not taken anything for pain. Patient alert and oriented x 4.        C= \"Have you ever felt that you should Cut down on your drinking? \"  No  A= \"Have people Annoyed you by criticizing your drinking? \"  No  G= \"Have you ever felt bad or Guilty about your drinking? \"  No  E= \"Have you ever had a drink as an Eye-opener first thing in the morning to steady your nerves or to help a hangover? \"  No      Deferred []      Reason for deferring: N/A    *If yes to two or more: probable alcohol abuse. *

## 2023-03-16 NOTE — ED PROVIDER NOTES
EMERGENCY DEPARTMENT ENCOUNTER    Pt Name: Diamond Zavala  MRN: [id number]  [place] 1940  Date of evaluation: 3/15/23  CHIEF COMPLAINT       Chief Complaint   Patient presents with    Fall    Knee Pain    Neck Pain     HISTORY OF PRESENT ILLNESS   81year-old woman history of hypertension, hyperlipidemia, CAD with stents, CVA, ITP, on aspirin and Plavix presents with a fall    Patient has chronic neuropathy and occasionally trips over her feet    This happened today getting out of the car she fell onto the concrete hurt her knees and her head    No loss of consciousness. Has some mild headache and neck pain. No nausea vomiting numbness tingling weakness chest pain abdominal pain or pain in the other extremities                REVIEW OF SYSTEMS     Review of Systems   Constitutional:  Negative for chills and fever. HENT:  Negative for rhinorrhea and sore throat. Eyes:  Negative for discharge and redness. Respiratory:  Negative for shortness of breath. Cardiovascular:  Negative for chest pain. Gastrointestinal:  Negative for abdominal pain, diarrhea, nausea and vomiting. Genitourinary:  Negative for dysuria, frequency and urgency. Musculoskeletal:  Negative for arthralgias and myalgias. Bilateral knee pain, neck pain   Skin:  Negative for rash. Neurological:  Positive for headaches. Negative for weakness and numbness. Psychiatric/Behavioral:  Negative for suicidal ideas. All other systems reviewed and are negative. PASTMEDICAL HISTORY     Past Medical History:   Diagnosis Date    Age-related cognitive decline     Ankle pain     Left ankle fracture in ortho boat.     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    left with urinary incontinenc post surgical    Caffeine use     2 coffee/day, 1-2 tea per week    Chronic back pain     Chronic ITP (idiopathic thrombocytopenia) (HCC)     CVA (cerebral infarction) 2008, 2010    balance issues, short term memory loss    Diabetic gastroparesis ([facility] [address]     Diverticular disease 1986    GERD (gastroesophageal reflux disease)     Hiatal hernia     Hip fracture ([facility])     History of blood transfusion     History of decreased platelet count     Hyperlipemia     Hypertension     Internal hemorrhoid     Macular degeneration of left eye 1980's    S/P laser treatment    Nausea and vomiting     Neuropathy     Osteoarthritis     Ringing in ears     Self-catheterizes urinary bladder     6-7 times daily    TIA (transient ischemic attack) 2000    x1    Tubular adenoma     colon polyps    Type II or unspecified type diabetes mellitus without mention of complication, not stated as uncontrolled     Urinary incontinence     frequent UTI s/p infection, surgical dilatation lead to incontinence    Wears dentures     full on top, partial on bottom    Wears glasses      Past Problem List  Patient Active Problem List   Diagnosis Code    TIA (transient ischemic attack) G45.9    Chronic back pain M54.9, G89.29    Diabetic peripheral neuropathy ([facility] [address] E11.42    Macular degeneration of left eye H35.30    Constipation K59.00    Thrombocytopenia ([facility]) D69.6    B12 deficiency E53.8    Vitamin D deficiency E55.9    Anemia D64.9    DDD (degenerative disc disease), cervical M50.30    Age-related cognitive decline R41.81    Acquired cyst of kidney N28.1    Microscopic hematuria R31.29    Bilateral renal cysts N28.1    Essential hypertension I10    Gastroesophageal reflux disease without esophagitis K21.9    Mixed incontinence N39.46    Recurrent UTI N39.0    Pure hypercholesterolemia E78.00    Hiatal hernia K44.9    Diabetic gastroparesis ([facility] [address] E11.43, K31.84    Internal hemorrhoid K64.8    Tubular adenoma D36.9    Colon polyps K63.5    Urge incontinence N39.41    S/P lumbar fusion Z98.1    Chronic ITP (idiopathic thrombocytopenia) ([facility]) D69.3    Urinary retention R33.9    Unsteadiness R26.81    Anxiety F41.9    Arthritis M19.90    Nausea R11.0    Diarrhea R19.7    Extrarenal pelvis [id number]    Primary osteoarthritis of left knee M17.12    Type 2 diabetes mellitus with diabetic peripheral angiopathy without gangrene, without long-term current use of insulin ([facility]) E11.51    Memory loss R41.3    Urethral pain R39.89    Bladder spasms N32.89    Atrophic vaginitis N95.2    Coronary artery disease involving native coronary artery of native heart without angina pectoris I25.10    Chronic fatigue R53.82    Essential tremor G25.0    Neuropathy G62.9    Coronary angioplasty status Z98.61    Recurrent major depressive disorder, in remission ([facility]) F33.40     SURGICAL HISTORY       Past Surgical History:   Procedure Laterality Date    APPENDECTOMY  1962    BLADDER SURGERY      bladder stimulator    BLADDER SUSPENSION  2002    multiple    CARDIAC CATHETERIZATION  2008    no stents    CARDIOVASCULAR STRESS TEST  12/2014    CATARACT REMOVAL WITH IMPLANT Left 11/07/2017    Raffoalhaji/Sulaiman    CATARACT REMOVAL WITH IMPLANT Right 11/28/2017    Raffoul/Sulaiman    COLON SURGERY      colostomy reversal    COLONOSCOPY  04/07/2016    tubular adenoma x3; internal hemorrhoids    COLONOSCOPY N/A 11/06/2019    COLONOSCOPY DIAGNOSTIC performed by Eli Marinelli MD at [facility] ENDO    COLONOSCOPY  11/06/2019    COLOSTOMY  1986    bowel obstruction/ rupture from diverticular disease, reversal 3 mos later    CORONARY ANGIOPLASTY WITH STENT PLACEMENT  08/04/2022    CYSTOSCOPY  09/08/2017    W/ 200IU Botox     FRACTURE SURGERY Right 2011    hip    FRACTURE SURGERY Left 2001    WRIST    FRACTURE SURGERY Left 2013    ankle    HERNIA REPAIR      HYSTERECTOMY (CERVIX STATUS UNKNOWN)  1969    JOINT REPLACEMENT Bilateral 2000    BILAT KNEES    LUMBAR FUSION  2017    L2/L3    FL XCAPSL CTRC RMVL INSJ IO LENS PROSTH W/O ECP Left 11/07/2017    EYE CATARACT EMULSIFICATION IOL IMPLANT performed by Missy Clements MD at [facility] OR    FL XCAPSL CTRC RMVL INSJ IO LENS PROSTH W/O ECP Right  11/28/2017    EYE CATARACT EMULSIFICATION IOL IMPLANT performed by Jose D Mckee MD at [address] Right 10/27/2015    WITH POLY EXCHANGE BIOMET AND GPS APPLICATION    REVISION TOTAL KNEE ARTHROPLASTY Left 07/14/2020    KNEE TOTAL ARTHROPLASTY REVISION - POLY EXCHANGE [facility] [address] [facility]  2010    fusion, did not take    [address] [facility]    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      TONSILLECTOMY  1978    UPPER GASTROINTESTINAL ENDOSCOPY      UPPER GASTROINTESTINAL ENDOSCOPY  05/05/2014    UPPER GASTROINTESTINAL ENDOSCOPY  04/07/2016    mild gastritis    UPPER GASTROINTESTINAL ENDOSCOPY N/A 07/17/2018    retained thick secretions in proximal esophagus     CURRENT MEDICATIONS       Previous Medications    ACETAMINOPHEN (TYLENOL) 500 MG TABLET    Take 2 tablets by mouth every 6 hours as needed for Pain    ALBUTEROL (PROVENTIL) (5 MG/ML) 0.5% NEBULIZER SOLUTION    Inhale 0.5 mLs into the lungs as needed     ASPIRIN 81 MG EC TABLET    Take 1 tablet by mouth 2 times daily    CLOPIDOGREL (PLAVIX) 75 MG TABLET    Take 75 mg by mouth daily    CONTINUOUS BLOOD GLUC  ([employer] DAVIS 14 DAY READER) MATTHEW        CONTINUOUS BLOOD GLUC SENSOR ([employer] DAVIS 14 DAY SENSOR) MISC        CRANBERRY PO    Take by mouth 2 times daily    D-MANNOSE 350 MG CAPS    Take 300 mg by mouth daily    ESOMEPRAZOLE (NEXIUM) 40 MG DELAYED RELEASE CAPSULE    Take 1 capsule by mouth every morning (before breakfast)    ESTRADIOL (ESTRACE) 0.1 MG/GM VAGINAL CREAM    Place 2 g vaginally daily    HYDROCORTISONE (ANUSOL-HC) 2.5 % CREA RECTAL CREAM    Place rectally 2 times daily    ISOSORBIDE MONONITRATE (IMDUR) 30 MG EXTENDED RELEASE TABLET        LIDOCAINE (XYLOCAINE) 2 % JELLY    APPLY TOPICALLY AS NEEDED    LIDOCAINE (XYLOCAINE) 5 % OINTMENT        LIVALO 2 MG TABS TABLET    TAKE 1 TABLET BY MOUTH IN THE EVENING    LUBIPROSTONE (AMITIZA) 8 MCG CAPS CAPSULE    Take 1 capsule by mouth daily    METOPROLOL SUCCINATE (TOPROL XL) 25 MG EXTENDED RELEASE TABLET    Take 12.5 mg by mouth daily    MULTIPLE VITAMINS-MINERALS (PRESERVISION AREDS PO)    Take by mouth daily     MULTIPLE VITAMINS-MINERALS (THERAPEUTIC MULTIVITAMIN-MINERALS) TABLET    Take 1 tablet by mouth daily     NITROFURANTOIN, MACROCRYSTAL-MONOHYDRATE, (MACROBID) 100 MG CAPSULE    TAKE 1 CAPSULE BY MOUTH EVERY DAY    NITROGLYCERIN (NITROSTAT) 0.4 MG SL TABLET    1 under the tongue as needed for angina, may repeat q5mins for up three doses    NYSTATIN (MYCOSTATIN) 774851 UNIT/GM POWDER    Apply 3 times daily. NYSTATIN-TRIAMCINOLONE (MYCOLOG II) 320533-1.1 UNIT/GM-% CREAM    Apply topically 4 times daily Apply topically 4 times daily. PRIMIDONE (MYSOLINE) 50 MG TABLET    Take 1 tablet by mouth nightly    RANOLAZINE (RANEXA) 500 MG EXTENDED RELEASE TABLET    Take 500 mg by mouth 2 times daily    RECTIV 0.4 % RECTAL OINTMENT        ROPINIROLE (REQUIP) 4 MG TABLET    Take 1 tablet by mouth nightly    VITAMIN B-12 (CYANOCOBALAMIN) 1000 MCG TABLET    Take 1 tablet by mouth once a week     ALLERGIES     is allergic to iodides, povidone-iodine, sulfa antibiotics, betadine [povidone iodine], cephalexin, cephalexin, cozaar [losartan], cymbalta [duloxetine hcl], demerol, doxycycline, meperidine, morphine, penicillins, zithromax [azithromycin], ciprofloxacin, clindamycin/lincomycin, etodolac, fluorescein, iodine, lisinopril, penicillin g, soap, toviaz [fesoterodine fumarate er], clonazepam, and metformin and related. FAMILY HISTORY     She indicated that her mother is . She indicated that her father is . She indicated that both of her sisters are . She indicated that her brother is . She indicated that her maternal grandmother is .      SOCIAL HISTORY       Social History     Tobacco Use    Smoking status: Former Packs/day: 0.50     Years: 30.00     Pack years: 15.00     Types: Cigarettes     Quit date: 1982     Years since quittin.8    Smokeless tobacco: Former     Quit date: 1982   Vaping Use    Vaping Use: Never used   Substance Use Topics    Alcohol use: No    Drug use: No     PHYSICAL EXAM     INITIAL VITALS: /80   Pulse 60   Temp 97.5 °F (36.4 °C) (Oral)   Resp 18   Ht 4' 9\" (1.448 m)   Wt 152 lb (68.9 kg)   SpO2 97%   BMI 32.89 kg/m²    Physical Exam  Vitals and nursing note reviewed. Constitutional:       Appearance: Normal appearance. HENT:      Head: Normocephalic and atraumatic. Nose: Nose normal.      Mouth/Throat:      Mouth: Mucous membranes are moist.   Eyes:      Conjunctiva/sclera: Conjunctivae normal.      Pupils: Pupils are equal, round, and reactive to light. Cardiovascular:      Rate and Rhythm: Normal rate and regular rhythm. Pulses: Normal pulses. Heart sounds: Normal heart sounds. Pulmonary:      Effort: Pulmonary effort is normal.      Breath sounds: Normal breath sounds. Abdominal:      Palpations: Abdomen is soft. Tenderness: There is no abdominal tenderness. Musculoskeletal:         General: No swelling or deformity. Cervical back: Normal range of motion. Comments: Abrasion bilateral knees with some tenderness to palpation    Good pulses in the extremity is not shortened sensation intact no pain with palpation down the midline spine or the upper extremities   Skin:     General: Skin is warm. Findings: No rash. Neurological:      General: No focal deficit present. Mental Status: She is alert and oriented to person, place, and time.       Comments: Cranial nerves II through XII intact, 5 out of 5 strength in upper and lower extremities, sensation intact throughout, normal finger-nose   Psychiatric:         Mood and Affect: Mood normal.       MEDICAL DECISION MAKING / ED COURSE:     81year-old presents with a mechanical fall with head injury and bilateral knee injuries    Given her age and aspirin and Plavix will obtain CT head C-spine x-rays of the knees and hip        1)  Number and Complexity of Problems Addressed at this Encounter  Problem List This Visit: Fall, head injury, knee injury    Differential Diagnosis: Intracranial bleed, concussion, contusion, closed head injury, knee contusion, knee fracture    Diagnoses Considered but Do Not Suspect: Arm fracture, chest injury, abdominal injury    Pertinent Comorbid Conditions: CAD on aspirin and Plavix    2)  Data Reviewed and Analyzed  (Lab and radiology tests/orders below in next section)      Imaging that is independently reviewed and interpreted by me as:  CT head, c spine, pelvis negative, xray knee negative    3)  Treatment and Disposition    ED Course as of 03/15/23 2317   Wed Mar 15, 2023   2109 XR KNEE RIGHT (3 VIEWS)  negative [JANIE]   2109 XR KNEE LEFT (3 VIEWS)  negative [JANIE]   2110 XR HIP 2-3 VW W PELVIS RIGHT  Possible pubic fracture will obtain CT [JANIE]   2202 CT Head W/O Contrast  Head CT: No acute intracranial abnormality detected. Cervical spine CT: No acute fracture or traumatic malalignment. [JANIE]   2202 CT CSpine W/O Contrast  Head CT: No acute intracranial abnormality detected. Cervical spine CT: No acute fracture or traumatic malalignment. [JANIE]   2259 CT PELVIS WO CONTRAST Additional Contrast? None  No acute abnormality is identified.   The possible fracture seen on the  radiograph from earlier was artifactual. [JANIE]      ED Course User Index  [JANIE] Dione Loera MD       Patient repeat assessment:  2300 negative    Disposition discussion with patient/family, Shared Decision Making: Discussed with patient discharge home Tylenol for pain follow-up with the primary doctor within 2 days    Return precautions given she is agreeable with plan    81year-old mechanical fall no injuries on CT imaging discharged home        CRITICAL CARE: PROCEDURES:    Procedures      DATA FOR LAB AND RADIOLOGY TESTS ORDERED BELOW ARE REVIEWED BY THE ED CLINICIAN:    RADIOLOGY: All x-rays, CT, MRI, and formal ultrasound images (except ED bedside ultrasound) are read by the radiologist, see reports below, unless otherwise noted in MDM or here. Reports below are reviewed by myself. CT PELVIS WO CONTRAST Additional Contrast? None   Final Result   No acute abnormality is identified. The possible fracture seen on the   radiograph from earlier was artifactual.         CT CSpine W/O Contrast   Final Result   Head CT: No acute intracranial abnormality detected. Cervical spine CT: No acute fracture or traumatic malalignment. CT Head W/O Contrast   Final Result   Head CT: No acute intracranial abnormality detected. Cervical spine CT: No acute fracture or traumatic malalignment. XR KNEE LEFT (3 VIEWS)   Final Result   No acute abnormality detected. XR KNEE RIGHT (3 VIEWS)   Final Result   No acute abnormality identified. XR HIP 2-3 VW W PELVIS RIGHT   Final Result   Questionable fracture of the right parasymphyseal pubic bone. Further   evaluate as CT is recommended to better characterize that. LABS: Lab orders shown below, the results are reviewed by myself, and all abnormals are listed below. Labs Reviewed - No data to display    Vitals Reviewed:    Vitals:    03/15/23 1947 03/15/23 2156   BP: 129/65 130/80   Pulse: 62 60   Resp: 16 18   Temp: 97.5 °F (36.4 °C)    TempSrc: Oral    SpO2: 99% 97%   Weight: 152 lb (68.9 kg)    Height: 4' 9\" (1.448 m)      MEDICATIONS GIVEN TO PATIENT THIS ENCOUNTER:  Orders Placed This Encounter   Medications    acetaminophen (TYLENOL) tablet 1,000 mg     DISCHARGE PRESCRIPTIONS:  New Prescriptions    No medications on file     PHYSICIAN CONSULTS ORDERED THIS ENCOUNTER:  None  FINAL IMPRESSION      1. Fall, initial encounter    2. Closed head injury, initial encounter    3. Contusion of knee, unspecified laterality, initial encounter          DISPOSITION/PLAN   DISPOSITION Decision To Discharge 03/15/2023 11:13:19 PM      OUTPATIENT FOLLOW UP THE PATIENT:  Sabiha Bedolla MD  [address] [facility]  [facility]  [facility] [address]  [phone]    Schedule an appointment as soon as possible for a visit in 2 days      [facility]  [address] [address]  [phone]    As needed    MD Eleazar Villa MD  03/15/23 5829

## 2023-03-31 ENCOUNTER — HOSPITAL ENCOUNTER (INPATIENT)
Age: 83
LOS: 4 days | Discharge: HOME HEALTH | DRG: 556 | End: 2023-04-04
Attending: EMERGENCY MEDICINE | Admitting: INTERNAL MEDICINE
Payer: MEDICARE

## 2023-03-31 ENCOUNTER — APPOINTMENT (OUTPATIENT)
Dept: CT IMAGING | Age: 83
DRG: 556 | End: 2023-03-31
Payer: MEDICARE

## 2023-03-31 ENCOUNTER — APPOINTMENT (OUTPATIENT)
Dept: GENERAL RADIOLOGY | Age: 83
DRG: 556 | End: 2023-03-31
Payer: MEDICARE

## 2023-03-31 DIAGNOSIS — F33.40 RECURRENT MAJOR DEPRESSIVE DISORDER, IN REMISSION (HCC): ICD-10-CM
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
## 2023-03-31 PROBLEM — R26.2 INABILITY TO WALK: Status: ACTIVE | Noted: 2023-03-31

## 2023-03-31 LAB
ABSOLUTE EOS #: 0.18 K/UL (ref 0–0.4)
ABSOLUTE LYMPH #: 1.71 K/UL (ref 1–4.8)
ABSOLUTE MONO #: 0.55 K/UL (ref 0.1–1.3)
ANION GAP SERPL CALCULATED.3IONS-SCNC: 13 MMOL/L (ref 9–17)
BASOPHILS # BLD: 2 % (ref 0–2)
BASOPHILS ABSOLUTE: 0.12 K/UL (ref 0–0.2)
BNP SERPL-MCNC: 181 PG/ML
BUN SERPL-MCNC: 21 MG/DL (ref 8–23)
CALCIUM SERPL-MCNC: 9.6 MG/DL (ref 8.6–10.4)
CHLORIDE SERPL-SCNC: 104 MMOL/L (ref 98–107)
CO2 SERPL-SCNC: 22 MMOL/L (ref 20–31)
CREAT SERPL-MCNC: 0.89 MG/DL (ref 0.5–0.9)
EOSINOPHILS RELATIVE PERCENT: 3 % (ref 0–4)
GFR SERPL CREATININE-BSD FRML MDRD: >60 ML/MIN/1.73M2
GLUCOSE BLD-MCNC: 268 MG/DL (ref 65–105)
GLUCOSE SERPL-MCNC: 131 MG/DL (ref 70–99)
HCT VFR BLD AUTO: 38.2 % (ref 36–46)
HGB BLD-MCNC: 13 G/DL (ref 12–16)
LYMPHOCYTES # BLD: 28 % (ref 24–44)
MCH RBC QN AUTO: 30.2 PG (ref 26–34)
MCHC RBC AUTO-ENTMCNC: 33.9 G/DL (ref 31–37)
MCV RBC AUTO: 89 FL (ref 80–100)
MONOCYTES # BLD: 9 % (ref 1–7)
MORPHOLOGY: ABNORMAL
PDW BLD-RTO: 14.7 % (ref 11.5–14.9)
PLATELET # BLD AUTO: 114 K/UL (ref 150–450)
PMV BLD AUTO: 12.6 FL (ref 6–12)
POTASSIUM SERPL-SCNC: 3.9 MMOL/L (ref 3.7–5.3)
RBC # BLD: 4.3 M/UL (ref 4–5.2)
SEG NEUTROPHILS: 58 % (ref 36–66)
SEGMENTED NEUTROPHILS ABSOLUTE COUNT: 3.54 K/UL (ref 1.3–9.1)
SODIUM SERPL-SCNC: 139 MMOL/L (ref 135–144)
TROPONIN I SERPL DL<=0.01 NG/ML-MCNC: 9 NG/L (ref 0–14)
WBC # BLD AUTO: 6.1 K/UL (ref 3.5–11)

## 2023-03-31 PROCEDURE — 6370000000 HC RX 637 (ALT 250 FOR IP): Performed by: STUDENT IN AN ORGANIZED HEALTH CARE EDUCATION/TRAINING PROGRAM

## 2023-03-31 PROCEDURE — 84484 ASSAY OF TROPONIN QUANT: CPT

## 2023-03-31 PROCEDURE — 82947 ASSAY GLUCOSE BLOOD QUANT: CPT

## 2023-03-31 PROCEDURE — 71045 X-RAY EXAM CHEST 1 VIEW: CPT

## 2023-03-31 PROCEDURE — 36415 COLL VENOUS BLD VENIPUNCTURE: CPT

## 2023-03-31 PROCEDURE — 73562 X-RAY EXAM OF KNEE 3: CPT

## 2023-03-31 PROCEDURE — 85025 COMPLETE CBC W/AUTO DIFF WBC: CPT

## 2023-03-31 PROCEDURE — 93005 ELECTROCARDIOGRAM TRACING: CPT | Performed by: STUDENT IN AN ORGANIZED HEALTH CARE EDUCATION/TRAINING PROGRAM

## 2023-03-31 PROCEDURE — 70450 CT HEAD/BRAIN W/O DYE: CPT

## 2023-03-31 PROCEDURE — 73552 X-RAY EXAM OF FEMUR 2/>: CPT

## 2023-03-31 PROCEDURE — 1200000000 HC SEMI PRIVATE

## 2023-03-31 PROCEDURE — 99285 EMERGENCY DEPT VISIT HI MDM: CPT

## 2023-03-31 PROCEDURE — 73630 X-RAY EXAM OF FOOT: CPT

## 2023-03-31 PROCEDURE — 83880 ASSAY OF NATRIURETIC PEPTIDE: CPT

## 2023-03-31 PROCEDURE — 73502 X-RAY EXAM HIP UNI 2-3 VIEWS: CPT

## 2023-03-31 PROCEDURE — 80048 BASIC METABOLIC PNL TOTAL CA: CPT

## 2023-03-31 PROCEDURE — 6370000000 HC RX 637 (ALT 250 FOR IP): Performed by: NURSE PRACTITIONER

## 2023-03-31 PROCEDURE — 72125 CT NECK SPINE W/O DYE: CPT

## 2023-03-31 PROCEDURE — 73610 X-RAY EXAM OF ANKLE: CPT

## 2023-03-31 PROCEDURE — 70486 CT MAXILLOFACIAL W/O DYE: CPT

## 2023-03-31 RX ORDER — LINAGLIPTIN 5 MG/1
1 TABLET, FILM COATED ORAL DAILY
COMMUNITY
Start: 2023-03-20

## 2023-03-31 RX ORDER — BUTALBITAL, ACETAMINOPHEN AND CAFFEINE 300; 40; 50 MG/1; MG/1; MG/1
1 CAPSULE ORAL ONCE
Status: COMPLETED | OUTPATIENT
Start: 2023-03-31 | End: 2023-03-31

## 2023-03-31 RX ORDER — ACETAMINOPHEN 500 MG
1000 TABLET ORAL ONCE
Status: COMPLETED | OUTPATIENT
Start: 2023-03-31 | End: 2023-03-31

## 2023-03-31 RX ADMIN — ACETAMINOPHEN 1000 MG: 500 TABLET ORAL at 18:59

## 2023-03-31 RX ADMIN — BUTALBITA,ACETAMINOPHEN AND CAFFEINE 1 CAPSULE: 50; 300; 40 CAPSULE ORAL at 22:02

## 2023-03-31 ASSESSMENT — ENCOUNTER SYMPTOMS
ABDOMINAL DISTENTION: 0
SORE THROAT: 0
PHOTOPHOBIA: 0
CONSTIPATION: 0
ABDOMINAL PAIN: 0
CHEST TIGHTNESS: 0
DIARRHEA: 0
ANAL BLEEDING: 0
RHINORRHEA: 0
VOMITING: 0
NAUSEA: 0
SHORTNESS OF BREATH: 0

## 2023-03-31 ASSESSMENT — PAIN SCALES - GENERAL: PAINLEVEL_OUTOF10: 6

## 2023-03-31 ASSESSMENT — PAIN DESCRIPTION - DESCRIPTORS: DESCRIPTORS: ACHING

## 2023-03-31 ASSESSMENT — PAIN DESCRIPTION - LOCATION: LOCATION: HIP

## 2023-03-31 ASSESSMENT — PAIN DESCRIPTION - ORIENTATION: ORIENTATION: RIGHT;LEFT

## 2023-03-31 NOTE — ED PROVIDER NOTES
[address]     Pt Name: Alvaro Graves  MRN: [id number]  [place] 1940  Date of evaluation: 3/31/23       Alvaro Graves is a 81 y.o. female who presents with Fall (knee) and Knee Pain  Mechanical fall from standing today when a dog ran out in front of her and she tripped. No LOC, no nausea/vomting, no confusion. On aspirin and plavix. She was able to ambulate a little at home but has left leg pain. MDM:   No traumatic injuries on imaging. Initial EKG was poor quality, concerning for potential new AF. Repeat EKG NSR. After extensive discussion patient is having multiple frequent falls at home. Likely due to her peripheral neuropathy. I think she would benefit for admission for PT/OT, potential placement in acute rehab and she is open to this. Will defer further cardiac w/up to inpatient team.     Vitals:   Vitals:    03/31/23 1721   BP: (!) 161/69   Pulse: 71   Resp: 17   SpO2: 97%   Weight: 152 lb (68.9 kg)         I personally saw and examined the patient. I have reviewed and agree with the resident's findings, including all diagnostic interpretations and treatment plan as written. I was present for the key portions of any procedures performed and the inclusive time noted for any critical care statement.     Zora Maurice MD  Attending Emergency Physician            Ximena Tan MD  03/31/23 5504
Cathie Miller MD   isosorbide mononitrate (IMDUR) 30 MG extended release tablet  11/25/22  Yes Historical Provider, MD   clopidogrel (PLAVIX) 75 MG tablet Take 75 mg by mouth daily 8/5/22  Yes Historical Provider, MD   metoprolol succinate (TOPROL XL) 25 MG extended release tablet Take 12.5 mg by mouth daily 8/19/22  Yes Historical Provider, MD   LIVALO 2 MG TABS tablet TAKE 1 TABLET BY MOUTH IN THE EVENING 4/28/22  Yes Cathie Miller MD   esomeprazole (Intean Poalroath Rongroeurng) 40 MG delayed release capsule Take 1 capsule by mouth every morning (before breakfast) 1/4/21  Yes Cathie Miller MD   vitamin B-12 (CYANOCOBALAMIN) 1000 MCG tablet Take 1 tablet by mouth once a week 12/8/20  Yes Cathie Miller MD   aspirin 81 MG EC tablet Take 1 tablet by mouth 2 times daily  Patient taking differently: Take 81 mg by mouth daily 7/14/20  Yes Tan Babcock MD   Multiple Vitamins-Minerals (THERAPEUTIC MULTIVITAMIN-MINERALS) tablet Take 1 tablet by mouth daily    Yes Historical Provider, MD   Multiple Vitamins-Minerals (PRESERVISION AREDS PO) Take by mouth daily    Yes Historical Provider, MD   TRADJENTA 5 MG tablet Take 1 tablet by mouth daily 3/20/23   Historical Provider, MD   nystatin-triamcinolone (MYCOLOG II) 354651-2.0 UNIT/GM-% cream Apply topically 4 times daily Apply topically 4 times daily. 1/23/23   Cathie Miller MD   nystatin (MYCOSTATIN) 403599 UNIT/GM powder Apply 3 times daily.  1/23/23   Cathie Miller MD   estradiol (ESTRACE) 0.1 MG/GM vaginal cream Place 2 g vaginally daily    Historical Provider, MD   nitroGLYCERIN (NITROSTAT) 0.4 MG SL tablet 1 under the tongue as needed for angina, may repeat q5mins for up three doses 4/7/22   Historical Provider, MD   RECTIV 0.4 % rectal ointment  8/23/22   Historical Provider, MD   lidocaine (XYLOCAINE) 5 % ointment  8/23/22   Historical Provider, MD   acetaminophen (TYLENOL) 500 MG tablet Take 2 tablets by mouth every 6 hours as needed for Pain 3/20/22   Inga Mathis

## 2023-03-31 NOTE — ED NOTES
Mode of arrival (squad #, walk in, police, etc) : EMS         Chief complaint(s): Fall, knee pain         Arrival Note (brief scenario, treatment PTA, etc). : Pt states tripping over her dog and falling. Pt states LT knee and ankle pain PMS intact. Pt states having both knees replaced twice. C= \"Have you ever felt that you should Cut down on your drinking? \"  No  A= \"Have people Annoyed you by criticizing your drinking? \"  No  G= \"Have you ever felt bad or Guilty about your drinking? \"  No  E= \"Have you ever had a drink as an Eye-opener first thing in the morning to steady your nerves or to help a hangover? \"  No      Deferred []      Reason for deferring: N/A    *If yes to two or more: probable alcohol abuse. Peterson Fabry  03/31/23 3750

## 2023-04-01 LAB
GLUCOSE BLD-MCNC: 182 MG/DL (ref 65–105)
GLUCOSE BLD-MCNC: 188 MG/DL (ref 65–105)
TROPONIN I SERPL DL<=0.01 NG/ML-MCNC: 9 NG/L (ref 0–14)

## 2023-04-01 PROCEDURE — 6370000000 HC RX 637 (ALT 250 FOR IP): Performed by: NURSE PRACTITIONER

## 2023-04-01 PROCEDURE — 97162 PT EVAL MOD COMPLEX 30 MIN: CPT

## 2023-04-01 PROCEDURE — 1200000000 HC SEMI PRIVATE

## 2023-04-01 PROCEDURE — 99222 1ST HOSP IP/OBS MODERATE 55: CPT | Performed by: INTERNAL MEDICINE

## 2023-04-01 PROCEDURE — 82947 ASSAY GLUCOSE BLOOD QUANT: CPT

## 2023-04-01 PROCEDURE — 2580000003 HC RX 258: Performed by: NURSE PRACTITIONER

## 2023-04-01 PROCEDURE — 6360000002 HC RX W HCPCS: Performed by: NURSE PRACTITIONER

## 2023-04-01 RX ORDER — SODIUM CHLORIDE 9 MG/ML
INJECTION, SOLUTION INTRAVENOUS PRN
Status: DISCONTINUED | OUTPATIENT
Start: 2023-04-01 | End: 2023-04-04 | Stop reason: HOSPADM

## 2023-04-01 RX ORDER — SODIUM CHLORIDE 0.9 % (FLUSH) 0.9 %
5-40 SYRINGE (ML) INJECTION EVERY 12 HOURS SCHEDULED
Status: DISCONTINUED | OUTPATIENT
Start: 2023-04-01 | End: 2023-04-04 | Stop reason: HOSPADM

## 2023-04-01 RX ORDER — CLOPIDOGREL BISULFATE 75 MG/1
75 TABLET ORAL DAILY
Status: DISCONTINUED | OUTPATIENT
Start: 2023-04-01 | End: 2023-04-04 | Stop reason: HOSPADM

## 2023-04-01 RX ORDER — ASPIRIN 81 MG/1
81 TABLET ORAL DAILY
Status: DISCONTINUED | OUTPATIENT
Start: 2023-04-01 | End: 2023-04-04 | Stop reason: HOSPADM

## 2023-04-01 RX ORDER — ENOXAPARIN SODIUM 100 MG/ML
40 INJECTION SUBCUTANEOUS DAILY
Status: DISCONTINUED | OUTPATIENT
Start: 2023-04-01 | End: 2023-04-04 | Stop reason: HOSPADM

## 2023-04-01 RX ORDER — ROPINIROLE 2 MG/1
4 TABLET, FILM COATED ORAL NIGHTLY
Status: DISCONTINUED | OUTPATIENT
Start: 2023-04-01 | End: 2023-04-04 | Stop reason: HOSPADM

## 2023-04-01 RX ORDER — SODIUM CHLORIDE 0.9 % (FLUSH) 0.9 %
5-40 SYRINGE (ML) INJECTION PRN
Status: DISCONTINUED | OUTPATIENT
Start: 2023-04-01 | End: 2023-04-04 | Stop reason: HOSPADM

## 2023-04-01 RX ORDER — ISOSORBIDE MONONITRATE 30 MG/1
30 TABLET, EXTENDED RELEASE ORAL DAILY
Status: DISCONTINUED | OUTPATIENT
Start: 2023-04-01 | End: 2023-04-04 | Stop reason: HOSPADM

## 2023-04-01 RX ORDER — ONDANSETRON 4 MG/1
4 TABLET, ORALLY DISINTEGRATING ORAL EVERY 8 HOURS PRN
Status: DISCONTINUED | OUTPATIENT
Start: 2023-04-01 | End: 2023-04-04 | Stop reason: HOSPADM

## 2023-04-01 RX ORDER — ONDANSETRON 2 MG/ML
4 INJECTION INTRAMUSCULAR; INTRAVENOUS EVERY 6 HOURS PRN
Status: DISCONTINUED | OUTPATIENT
Start: 2023-04-01 | End: 2023-04-04 | Stop reason: HOSPADM

## 2023-04-01 RX ORDER — INSULIN LISPRO 100 [IU]/ML
0-8 INJECTION, SOLUTION INTRAVENOUS; SUBCUTANEOUS
Status: DISCONTINUED | OUTPATIENT
Start: 2023-04-01 | End: 2023-04-04 | Stop reason: HOSPADM

## 2023-04-01 RX ORDER — METOPROLOL SUCCINATE 25 MG/1
12.5 TABLET, EXTENDED RELEASE ORAL DAILY
Status: DISCONTINUED | OUTPATIENT
Start: 2023-04-01 | End: 2023-04-04 | Stop reason: HOSPADM

## 2023-04-01 RX ORDER — ACETAMINOPHEN 650 MG/1
650 SUPPOSITORY RECTAL EVERY 6 HOURS PRN
Status: DISCONTINUED | OUTPATIENT
Start: 2023-04-01 | End: 2023-04-04 | Stop reason: HOSPADM

## 2023-04-01 RX ORDER — INSULIN LISPRO 100 [IU]/ML
0-4 INJECTION, SOLUTION INTRAVENOUS; SUBCUTANEOUS NIGHTLY
Status: DISCONTINUED | OUTPATIENT
Start: 2023-04-01 | End: 2023-04-04 | Stop reason: HOSPADM

## 2023-04-01 RX ORDER — PRIMIDONE 50 MG/1
50 TABLET ORAL NIGHTLY
Status: DISCONTINUED | OUTPATIENT
Start: 2023-04-01 | End: 2023-04-04 | Stop reason: HOSPADM

## 2023-04-01 RX ORDER — ACETAMINOPHEN 325 MG/1
650 TABLET ORAL EVERY 6 HOURS PRN
Status: DISCONTINUED | OUTPATIENT
Start: 2023-04-01 | End: 2023-04-04 | Stop reason: HOSPADM

## 2023-04-01 RX ORDER — DEXTROSE MONOHYDRATE 100 MG/ML
INJECTION, SOLUTION INTRAVENOUS CONTINUOUS PRN
Status: DISCONTINUED | OUTPATIENT
Start: 2023-04-01 | End: 2023-04-04 | Stop reason: HOSPADM

## 2023-04-01 RX ORDER — HYDROCODONE BITARTRATE AND ACETAMINOPHEN 5; 325 MG/1; MG/1
1 TABLET ORAL EVERY 6 HOURS PRN
Status: DISCONTINUED | OUTPATIENT
Start: 2023-04-01 | End: 2023-04-04 | Stop reason: HOSPADM

## 2023-04-01 RX ORDER — RANOLAZINE 500 MG/1
500 TABLET, EXTENDED RELEASE ORAL 2 TIMES DAILY
Status: DISCONTINUED | OUTPATIENT
Start: 2023-04-01 | End: 2023-04-04 | Stop reason: HOSPADM

## 2023-04-01 RX ORDER — POLYETHYLENE GLYCOL 3350 17 G/17G
17 POWDER, FOR SOLUTION ORAL DAILY PRN
Status: DISCONTINUED | OUTPATIENT
Start: 2023-04-01 | End: 2023-04-04 | Stop reason: HOSPADM

## 2023-04-01 RX ORDER — ATORVASTATIN CALCIUM 10 MG/1
10 TABLET, FILM COATED ORAL DAILY
Status: DISCONTINUED | OUTPATIENT
Start: 2023-04-01 | End: 2023-04-04 | Stop reason: HOSPADM

## 2023-04-01 RX ORDER — PANTOPRAZOLE SODIUM 40 MG/1
40 TABLET, DELAYED RELEASE ORAL
Status: DISCONTINUED | OUTPATIENT
Start: 2023-04-01 | End: 2023-04-04 | Stop reason: HOSPADM

## 2023-04-01 RX ORDER — ALBUTEROL SULFATE 2.5 MG/3ML
2.5 SOLUTION RESPIRATORY (INHALATION) EVERY 4 HOURS PRN
Status: DISCONTINUED | OUTPATIENT
Start: 2023-04-01 | End: 2023-04-04 | Stop reason: HOSPADM

## 2023-04-01 RX ADMIN — ISOSORBIDE MONONITRATE 30 MG: 30 TABLET, EXTENDED RELEASE ORAL at 11:30

## 2023-04-01 RX ADMIN — PANTOPRAZOLE SODIUM 40 MG: 40 TABLET, DELAYED RELEASE ORAL at 11:21

## 2023-04-01 RX ADMIN — SODIUM CHLORIDE, PRESERVATIVE FREE 10 ML: 5 INJECTION INTRAVENOUS at 11:52

## 2023-04-01 RX ADMIN — INSULIN LISPRO 2 UNITS: 100 INJECTION, SOLUTION INTRAVENOUS; SUBCUTANEOUS at 17:45

## 2023-04-01 RX ADMIN — ATORVASTATIN CALCIUM 10 MG: 10 TABLET, FILM COATED ORAL at 11:22

## 2023-04-01 RX ADMIN — SODIUM CHLORIDE, PRESERVATIVE FREE 10 ML: 5 INJECTION INTRAVENOUS at 21:18

## 2023-04-01 RX ADMIN — RANOLAZINE 500 MG: 500 TABLET, FILM COATED, EXTENDED RELEASE ORAL at 21:18

## 2023-04-01 RX ADMIN — CLOPIDOGREL BISULFATE 75 MG: 75 TABLET ORAL at 11:22

## 2023-04-01 RX ADMIN — ASPIRIN 81 MG: 81 TABLET, COATED ORAL at 11:21

## 2023-04-01 RX ADMIN — METOPROLOL SUCCINATE 12.5 MG: 25 TABLET, EXTENDED RELEASE ORAL at 11:30

## 2023-04-01 RX ADMIN — ENOXAPARIN SODIUM 40 MG: 100 INJECTION SUBCUTANEOUS at 11:41

## 2023-04-01 RX ADMIN — PRIMIDONE 50 MG: 50 TABLET ORAL at 21:18

## 2023-04-01 RX ADMIN — ROPINIROLE HYDROCHLORIDE 4 MG: 2 TABLET, FILM COATED ORAL at 21:18

## 2023-04-01 RX ADMIN — RANOLAZINE 500 MG: 500 TABLET, FILM COATED, EXTENDED RELEASE ORAL at 11:22

## 2023-04-01 RX ADMIN — ACETAMINOPHEN 650 MG: 325 TABLET ORAL at 11:29

## 2023-04-01 RX ADMIN — HYDROCODONE BITARTRATE AND ACETAMINOPHEN 1 TABLET: 5; 325 TABLET ORAL at 00:54

## 2023-04-01 ASSESSMENT — PAIN DESCRIPTION - DESCRIPTORS
DESCRIPTORS: ACHING
DESCRIPTORS: ACHING

## 2023-04-01 ASSESSMENT — PAIN DESCRIPTION - LOCATION
LOCATION: KNEE
LOCATION: HEAD;NECK

## 2023-04-01 ASSESSMENT — PAIN SCALES - GENERAL
PAINLEVEL_OUTOF10: 9
PAINLEVEL_OUTOF10: 8

## 2023-04-01 ASSESSMENT — PAIN DESCRIPTION - ORIENTATION: ORIENTATION: LEFT

## 2023-04-01 NOTE — H&P
63    Cancer Sister         breast       Review of Systems:     Positive and Negative as described in HPI. CONSTITUTIONAL:  negative for fevers, chills, sweats, fatigue, weight loss  HEENT:  negative for vision, hearing changes, runny nose, throat pain  RESPIRATORY:  negative for shortness of breath, cough, congestion, wheezing. CARDIOVASCULAR:  negative for chest pain, palpitations. GASTROINTESTINAL:  negative for nausea, vomiting, diarrhea, constipation, change in bowel habits, abdominal pain   GENITOURINARY:  negative for difficulty of urination, burning with urination, frequency   INTEGUMENT:  negative for rash, skin lesions, easy bruising   HEMATOLOGIC/LYMPHATIC:  negative for swelling/edema   ALLERGIC/IMMUNOLOGIC:  negative for urticaria , itching  ENDOCRINE:  negative increase in drinking, increase in urination, hot or cold intolerance  MUSCULOSKELETAL: Pain in left leg   NEUROLOGICAL:  negative for headaches, dizziness, lightheadedness, numbness, pain, tingling extremities  BEHAVIOR/PSYCH:  negative for depression, anxiety    Physical Exam:   BP (!) 160/75   Pulse 66   Temp 97.3 °F (36.3 °C)   Resp 16   Ht 4' 9\" (1.448 m)   Wt 152 lb (68.9 kg)   SpO2 100%   BMI 32.89 kg/m²   Temp (24hrs), Av.3 °F (36.3 °C), Min:97.2 °F (36.2 °C), Max:97.3 °F (36.3 °C)    Recent Labs     23  2326 23  0602   POCGLU 268* 188*       Intake/Output Summary (Last 24 hours) at 2023 1654  Last data filed at 2023 0846  Gross per 24 hour   Intake --   Output 350 ml   Net -350 ml       General Appearance:  alert, well appearing, and in no acute distress  Mental status: oriented to person, place, and time with normal affect  Head:  normocephalic, atraumatic.   Eye: no icterus, redness, pupils equal and reactive, extraocular eye movements intact, conjunctiva clear  Ear: normal external ear, no discharge, hearing intact  Nose:  no drainage noted  Mouth: mucous membranes moist  Neck: supple,

## 2023-04-01 NOTE — ACP (ADVANCE CARE PLANNING)
Conversation Outcomes:  ACP discussion completed    Follow-up plan:    [] Schedule follow-up conversation to continue planning  [] Referred individual to Provider for additional questions/concerns   [] Advised patient/agent/surrogate to review completed ACP document and update if needed with changes in condition, patient preferences or care setting    [] This note routed to one or more involved healthcare providers

## 2023-04-02 LAB
ABSOLUTE EOS #: 0.18 K/UL (ref 0–0.4)
ABSOLUTE LYMPH #: 1.02 K/UL (ref 1–4.8)
ABSOLUTE MONO #: 0.48 K/UL (ref 0.1–1.3)
ANION GAP SERPL CALCULATED.3IONS-SCNC: 10 MMOL/L (ref 9–17)
BASOPHILS # BLD: 1 % (ref 0–2)
BASOPHILS ABSOLUTE: 0.06 K/UL (ref 0–0.2)
BUN SERPL-MCNC: 25 MG/DL (ref 8–23)
CALCIUM SERPL-MCNC: 8.9 MG/DL (ref 8.6–10.4)
CHLORIDE SERPL-SCNC: 108 MMOL/L (ref 98–107)
CO2 SERPL-SCNC: 21 MMOL/L (ref 20–31)
CREAT SERPL-MCNC: 1.04 MG/DL (ref 0.5–0.9)
EOSINOPHILS RELATIVE PERCENT: 3 % (ref 0–4)
GFR SERPL CREATININE-BSD FRML MDRD: 54 ML/MIN/1.73M2
GLUCOSE BLD-MCNC: 183 MG/DL (ref 65–105)
GLUCOSE BLD-MCNC: 187 MG/DL (ref 65–105)
GLUCOSE BLD-MCNC: 233 MG/DL (ref 65–105)
GLUCOSE BLD-MCNC: 256 MG/DL (ref 65–105)
GLUCOSE SERPL-MCNC: 232 MG/DL (ref 70–99)
HCT VFR BLD AUTO: 34.5 % (ref 36–46)
HGB BLD-MCNC: 11.3 G/DL (ref 12–16)
LYMPHOCYTES # BLD: 17 % (ref 24–44)
MCH RBC QN AUTO: 30.2 PG (ref 26–34)
MCHC RBC AUTO-ENTMCNC: 32.8 G/DL (ref 31–37)
MCV RBC AUTO: 92.2 FL (ref 80–100)
MONOCYTES # BLD: 8 % (ref 1–7)
MORPHOLOGY: ABNORMAL
PDW BLD-RTO: 14.9 % (ref 11.5–14.9)
PLATELET # BLD AUTO: 98 K/UL (ref 150–450)
PMV BLD AUTO: 12.6 FL (ref 6–12)
POTASSIUM SERPL-SCNC: 4.3 MMOL/L (ref 3.7–5.3)
RBC # BLD: 3.74 M/UL (ref 4–5.2)
SEG NEUTROPHILS: 71 % (ref 36–66)
SEGMENTED NEUTROPHILS ABSOLUTE COUNT: 4.26 K/UL (ref 1.3–9.1)
SODIUM SERPL-SCNC: 139 MMOL/L (ref 135–144)
WBC # BLD AUTO: 6 K/UL (ref 3.5–11)

## 2023-04-02 PROCEDURE — 82947 ASSAY GLUCOSE BLOOD QUANT: CPT

## 2023-04-02 PROCEDURE — 2580000003 HC RX 258: Performed by: NURSE PRACTITIONER

## 2023-04-02 PROCEDURE — 6370000000 HC RX 637 (ALT 250 FOR IP): Performed by: NURSE PRACTITIONER

## 2023-04-02 PROCEDURE — 1200000000 HC SEMI PRIVATE

## 2023-04-02 PROCEDURE — 85025 COMPLETE CBC W/AUTO DIFF WBC: CPT

## 2023-04-02 PROCEDURE — 97166 OT EVAL MOD COMPLEX 45 MIN: CPT

## 2023-04-02 PROCEDURE — 51701 INSERT BLADDER CATHETER: CPT

## 2023-04-02 PROCEDURE — 97530 THERAPEUTIC ACTIVITIES: CPT

## 2023-04-02 PROCEDURE — 80048 BASIC METABOLIC PNL TOTAL CA: CPT

## 2023-04-02 PROCEDURE — 36415 COLL VENOUS BLD VENIPUNCTURE: CPT

## 2023-04-02 PROCEDURE — 6360000002 HC RX W HCPCS: Performed by: NURSE PRACTITIONER

## 2023-04-02 PROCEDURE — 99232 SBSQ HOSP IP/OBS MODERATE 35: CPT | Performed by: INTERNAL MEDICINE

## 2023-04-02 RX ADMIN — ASPIRIN 81 MG: 81 TABLET, COATED ORAL at 09:08

## 2023-04-02 RX ADMIN — CLOPIDOGREL BISULFATE 75 MG: 75 TABLET ORAL at 09:08

## 2023-04-02 RX ADMIN — ATORVASTATIN CALCIUM 10 MG: 10 TABLET, FILM COATED ORAL at 09:08

## 2023-04-02 RX ADMIN — PRIMIDONE 50 MG: 50 TABLET ORAL at 21:39

## 2023-04-02 RX ADMIN — SODIUM CHLORIDE, PRESERVATIVE FREE 10 ML: 5 INJECTION INTRAVENOUS at 09:08

## 2023-04-02 RX ADMIN — RANOLAZINE 500 MG: 500 TABLET, FILM COATED, EXTENDED RELEASE ORAL at 09:08

## 2023-04-02 RX ADMIN — ISOSORBIDE MONONITRATE 30 MG: 30 TABLET, EXTENDED RELEASE ORAL at 09:08

## 2023-04-02 RX ADMIN — ENOXAPARIN SODIUM 40 MG: 100 INJECTION SUBCUTANEOUS at 09:08

## 2023-04-02 RX ADMIN — INSULIN LISPRO 2 UNITS: 100 INJECTION, SOLUTION INTRAVENOUS; SUBCUTANEOUS at 15:57

## 2023-04-02 RX ADMIN — ACETAMINOPHEN 650 MG: 325 TABLET ORAL at 16:44

## 2023-04-02 RX ADMIN — ROPINIROLE HYDROCHLORIDE 4 MG: 2 TABLET, FILM COATED ORAL at 21:39

## 2023-04-02 RX ADMIN — PANTOPRAZOLE SODIUM 40 MG: 40 TABLET, DELAYED RELEASE ORAL at 06:33

## 2023-04-02 RX ADMIN — RANOLAZINE 500 MG: 500 TABLET, FILM COATED, EXTENDED RELEASE ORAL at 21:21

## 2023-04-02 RX ADMIN — SODIUM CHLORIDE, PRESERVATIVE FREE 10 ML: 5 INJECTION INTRAVENOUS at 21:43

## 2023-04-02 RX ADMIN — METOPROLOL SUCCINATE 12.5 MG: 25 TABLET, EXTENDED RELEASE ORAL at 09:08

## 2023-04-02 ASSESSMENT — PAIN DESCRIPTION - ORIENTATION: ORIENTATION: LOWER

## 2023-04-02 ASSESSMENT — PAIN SCALES - GENERAL: PAINLEVEL_OUTOF10: 4

## 2023-04-02 ASSESSMENT — PAIN DESCRIPTION - LOCATION: LOCATION: BACK

## 2023-04-02 NOTE — PLAN OF CARE
Problem: Discharge Planning  Goal: Discharge to home or other facility with appropriate resources  4/2/2023 0437 by Angie Orosco RN  Outcome: Progressing  4/1/2023 1443 by Michaela Black RN  Outcome: Progressing     Problem: Skin/Tissue Integrity  Goal: Absence of new skin breakdown  Description: 1. Monitor for areas of redness and/or skin breakdown  2. Assess vascular access sites hourly  3. Every 4-6 hours minimum:  Change oxygen saturation probe site  4. Every 4-6 hours:  If on nasal continuous positive airway pressure, respiratory therapy assess nares and determine need for appliance change or resting period.   4/2/2023 0437 by Angie Orosco RN  Outcome: Progressing  4/1/2023 1443 by Michaela Black RN  Outcome: Progressing     Problem: Safety - Adult  Goal: Free from fall injury  4/2/2023 0437 by Angie Orosco RN  Outcome: Progressing  4/1/2023 1443 by Michaela Black RN  Outcome: Progressing     Problem: ABCDS Injury Assessment  Goal: Absence of physical injury  4/1/2023 1443 by Michaela Black RN  Outcome: Progressing

## 2023-04-02 NOTE — PLAN OF CARE
Problem: Discharge Planning  Goal: Discharge to home or other facility with appropriate resources  4/2/2023 1822 by Dahlia Camejo RN  Outcome: Progressing  4/2/2023 0437 by Opal Hernandez RN  Outcome: Progressing     Problem: Skin/Tissue Integrity  Goal: Absence of new skin breakdown  Description: 1. Monitor for areas of redness and/or skin breakdown  2. Assess vascular access sites hourly  3. Every 4-6 hours minimum:  Change oxygen saturation probe site  4. Every 4-6 hours:  If on nasal continuous positive airway pressure, respiratory therapy assess nares and determine need for appliance change or resting period.   4/2/2023 1822 by Dahlia Camejo RN  Outcome: Progressing  4/2/2023 0437 by Opal Hernandez RN  Outcome: Progressing     Problem: Safety - Adult  Goal: Free from fall injury  4/2/2023 1822 by Dahlia Camejo RN  Outcome: Progressing  4/2/2023 0437 by Opal Hernandez RN  Outcome: Progressing     Problem: ABCDS Injury Assessment  Goal: Absence of physical injury  Outcome: Progressing

## 2023-04-03 ENCOUNTER — APPOINTMENT (OUTPATIENT)
Dept: CT IMAGING | Age: 83
DRG: 556 | End: 2023-04-03
Payer: MEDICARE

## 2023-04-03 LAB
ABSOLUTE EOS #: 0.24 K/UL (ref 0–0.4)
ABSOLUTE LYMPH #: 1.27 K/UL (ref 1–4.8)
ABSOLUTE MONO #: 0.47 K/UL (ref 0.1–1.3)
ANION GAP SERPL CALCULATED.3IONS-SCNC: 8 MMOL/L (ref 9–17)
BASOPHILS # BLD: 1 % (ref 0–2)
BASOPHILS ABSOLUTE: 0.05 K/UL (ref 0–0.2)
BUN SERPL-MCNC: 20 MG/DL (ref 8–23)
CALCIUM SERPL-MCNC: 9 MG/DL (ref 8.6–10.4)
CHLORIDE SERPL-SCNC: 107 MMOL/L (ref 98–107)
CO2 SERPL-SCNC: 23 MMOL/L (ref 20–31)
CREAT SERPL-MCNC: 0.73 MG/DL (ref 0.5–0.9)
EKG ATRIAL RATE: 267 BPM
EKG ATRIAL RATE: 65 BPM
EKG P AXIS: 13 DEGREES
EKG P-R INTERVAL: 156 MS
EKG Q-T INTERVAL: 414 MS
EKG Q-T INTERVAL: 428 MS
EKG QRS DURATION: 88 MS
EKG QRS DURATION: 90 MS
EKG QTC CALCULATION (BAZETT): 443 MS
EKG QTC CALCULATION (BAZETT): 445 MS
EKG R AXIS: -24 DEGREES
EKG R AXIS: -31 DEGREES
EKG T AXIS: 22 DEGREES
EKG T AXIS: 9 DEGREES
EKG VENTRICULAR RATE: 65 BPM
EKG VENTRICULAR RATE: 69 BPM
EOSINOPHILS RELATIVE PERCENT: 5 % (ref 0–4)
GFR SERPL CREATININE-BSD FRML MDRD: >60 ML/MIN/1.73M2
GLUCOSE BLD-MCNC: 174 MG/DL (ref 65–105)
GLUCOSE BLD-MCNC: 210 MG/DL (ref 65–105)
GLUCOSE BLD-MCNC: 220 MG/DL (ref 65–105)
GLUCOSE BLD-MCNC: 229 MG/DL (ref 65–105)
GLUCOSE SERPL-MCNC: 225 MG/DL (ref 70–99)
HCT VFR BLD AUTO: 34.7 % (ref 36–46)
HGB BLD-MCNC: 11.3 G/DL (ref 12–16)
LYMPHOCYTES # BLD: 27 % (ref 24–44)
MCH RBC QN AUTO: 29.8 PG (ref 26–34)
MCHC RBC AUTO-ENTMCNC: 32.7 G/DL (ref 31–37)
MCV RBC AUTO: 91.1 FL (ref 80–100)
MONOCYTES # BLD: 10 % (ref 1–7)
MORPHOLOGY: NORMAL
PDW BLD-RTO: 14.6 % (ref 11.5–14.9)
PLATELET # BLD AUTO: 102 K/UL (ref 150–450)
PMV BLD AUTO: 12.7 FL (ref 6–12)
POTASSIUM SERPL-SCNC: 4.2 MMOL/L (ref 3.7–5.3)
RBC # BLD: 3.81 M/UL (ref 4–5.2)
SEG NEUTROPHILS: 57 % (ref 36–66)
SEGMENTED NEUTROPHILS ABSOLUTE COUNT: 2.67 K/UL (ref 1.3–9.1)
SODIUM SERPL-SCNC: 138 MMOL/L (ref 135–144)
WBC # BLD AUTO: 4.7 K/UL (ref 3.5–11)

## 2023-04-03 PROCEDURE — 36415 COLL VENOUS BLD VENIPUNCTURE: CPT

## 2023-04-03 PROCEDURE — 99232 SBSQ HOSP IP/OBS MODERATE 35: CPT | Performed by: INTERNAL MEDICINE

## 2023-04-03 PROCEDURE — 6370000000 HC RX 637 (ALT 250 FOR IP): Performed by: NURSE PRACTITIONER

## 2023-04-03 PROCEDURE — 85025 COMPLETE CBC W/AUTO DIFF WBC: CPT

## 2023-04-03 PROCEDURE — 97530 THERAPEUTIC ACTIVITIES: CPT

## 2023-04-03 PROCEDURE — 1200000000 HC SEMI PRIVATE

## 2023-04-03 PROCEDURE — 74176 CT ABD & PELVIS W/O CONTRAST: CPT

## 2023-04-03 PROCEDURE — 93010 ELECTROCARDIOGRAM REPORT: CPT | Performed by: INTERNAL MEDICINE

## 2023-04-03 PROCEDURE — 51701 INSERT BLADDER CATHETER: CPT

## 2023-04-03 PROCEDURE — 80048 BASIC METABOLIC PNL TOTAL CA: CPT

## 2023-04-03 PROCEDURE — 82947 ASSAY GLUCOSE BLOOD QUANT: CPT

## 2023-04-03 RX ORDER — ALOGLIPTIN 12.5 MG/1
12.5 TABLET, FILM COATED ORAL DAILY
Status: DISCONTINUED | OUTPATIENT
Start: 2023-04-03 | End: 2023-04-04 | Stop reason: HOSPADM

## 2023-04-03 RX ADMIN — ATORVASTATIN CALCIUM 10 MG: 10 TABLET, FILM COATED ORAL at 08:07

## 2023-04-03 RX ADMIN — CLOPIDOGREL BISULFATE 75 MG: 75 TABLET ORAL at 08:07

## 2023-04-03 RX ADMIN — RANOLAZINE 500 MG: 500 TABLET, FILM COATED, EXTENDED RELEASE ORAL at 08:06

## 2023-04-03 RX ADMIN — ISOSORBIDE MONONITRATE 30 MG: 30 TABLET, EXTENDED RELEASE ORAL at 08:07

## 2023-04-03 RX ADMIN — INSULIN LISPRO 2 UNITS: 100 INJECTION, SOLUTION INTRAVENOUS; SUBCUTANEOUS at 11:38

## 2023-04-03 RX ADMIN — ACETAMINOPHEN 650 MG: 325 TABLET ORAL at 05:42

## 2023-04-03 RX ADMIN — RANOLAZINE 500 MG: 500 TABLET, FILM COATED, EXTENDED RELEASE ORAL at 20:06

## 2023-04-03 RX ADMIN — PANTOPRAZOLE SODIUM 40 MG: 40 TABLET, DELAYED RELEASE ORAL at 05:42

## 2023-04-03 RX ADMIN — HYDROCODONE BITARTRATE AND ACETAMINOPHEN 1 TABLET: 5; 325 TABLET ORAL at 08:07

## 2023-04-03 RX ADMIN — ROPINIROLE HYDROCHLORIDE 4 MG: 2 TABLET, FILM COATED ORAL at 20:25

## 2023-04-03 RX ADMIN — ASPIRIN 81 MG: 81 TABLET, COATED ORAL at 08:07

## 2023-04-03 ASSESSMENT — PAIN DESCRIPTION - LOCATION
LOCATION: HIP;BACK;ABDOMEN
LOCATION: BACK

## 2023-04-03 ASSESSMENT — PAIN DESCRIPTION - ORIENTATION: ORIENTATION: RIGHT

## 2023-04-03 ASSESSMENT — PAIN DESCRIPTION - DESCRIPTORS: DESCRIPTORS: ACHING;SHOOTING

## 2023-04-03 ASSESSMENT — PAIN SCALES - GENERAL
PAINLEVEL_OUTOF10: 10
PAINLEVEL_OUTOF10: 4

## 2023-04-03 NOTE — PLAN OF CARE
Problem: Discharge Planning  Goal: Discharge to home or other facility with appropriate resources  4/3/2023 0453 by Yun Yan RN  Outcome: Progressing  4/2/2023 1822 by Lobo Robles RN  Outcome: Progressing     Problem: Skin/Tissue Integrity  Goal: Absence of new skin breakdown  Description: 1. Monitor for areas of redness and/or skin breakdown  2. Assess vascular access sites hourly  3. Every 4-6 hours minimum:  Change oxygen saturation probe site  4. Every 4-6 hours:  If on nasal continuous positive airway pressure, respiratory therapy assess nares and determine need for appliance change or resting period.   4/3/2023 0453 by Yun Yan RN  Outcome: Progressing  4/2/2023 1822 by Lobo Robles RN  Outcome: Progressing     Problem: Safety - Adult  Goal: Free from fall injury  4/3/2023 0453 by Yun Yan RN  Outcome: Progressing  4/2/2023 1822 by Lobo Robles RN  Outcome: Progressing     Problem: ABCDS Injury Assessment  Goal: Absence of physical injury  4/2/2023 1822 by Lobo Robles RN  Outcome: Progressing

## 2023-04-03 NOTE — DISCHARGE INSTR - COC
Continuity of Care Form    Patient Name: Henrietta Simmons   :  1940  MRN:  [id number]    Admit date:  3/31/2023  Discharge date:  23    Code Status Order: Full Code   Advance Directives:     Admitting Physician:  Anitha Collado MD  PCP: Ladarius Slade MD    Discharging Nurse: [facility]/Room#: 2612/2268-56  Discharging Unit Phone Number: [phone]    Emergency Contact:   Extended Emergency Contact Information  Primary Emergency Contact: Ritchie Crawford  Address: [address] [place] [address] Masha Denia of [address] Phone: [phone]  Work Phone: [phone]  Mobile Phone: [phone]  Relation: Spouse  Secondary Emergency Contact: David [address] Phone: [phone]  Mobile Phone: [phone]  Relation: Other    Past Surgical History:  Past Surgical History:   Procedure Laterality Date    APPENDECTOMY      BLADDER SURGERY      bladder stimulator    BLADDER SUSPENSION      multiple    CARDIAC CATHETERIZATION      no stents    CARDIOVASCULAR STRESS TEST  2014    CATARACT REMOVAL WITH IMPLANT Left 2017    [facility]    CATARACT REMOVAL WITH IMPLANT Right 2017    [facility]    COLON SURGERY      colostomy reversal    COLONOSCOPY  2016    tubular adenoma x3; internal hemorrhoids    COLONOSCOPY N/A 2019    COLONOSCOPY DIAGNOSTIC performed by Lucy Medina MD at . [facility]  2019    COLOSTOMY  1986    bowel obstruction/ rupture from diverticular disease, reversal 3 mos later    CORONARY ANGIOPLASTY WITH STENT PLACEMENT  2022    CYSTOSCOPY  2017    W/ 200IU Botox     FRACTURE SURGERY Right     hip    FRACTURE SURGERY Left     WRIST    FRACTURE SURGERY Left     ankle    HERNIA REPAIR      HYSTERECTOMY (CERVIX STATUS UNKNOWN)  1969    JOINT REPLACEMENT Bilateral     BILAT KNEES    LUMBAR FUSION  2017    L2/L3    NE XCAPSL CTRC RMVL INSJ IO

## 2023-04-04 VITALS
HEIGHT: 57 IN | WEIGHT: 155.42 LBS | SYSTOLIC BLOOD PRESSURE: 165 MMHG | BODY MASS INDEX: 33.53 KG/M2 | RESPIRATION RATE: 16 BRPM | TEMPERATURE: 97.8 F | OXYGEN SATURATION: 100 % | HEART RATE: 65 BPM | DIASTOLIC BLOOD PRESSURE: 60 MMHG

## 2023-04-04 LAB
ABSOLUTE EOS #: 0.43 K/UL (ref 0–0.4)
ABSOLUTE LYMPH #: 1.25 K/UL (ref 1–4.8)
ABSOLUTE MONO #: 0.14 K/UL (ref 0.1–1.3)
ANION GAP SERPL CALCULATED.3IONS-SCNC: 9 MMOL/L (ref 9–17)
BASOPHILS # BLD: 2 % (ref 0–2)
BASOPHILS ABSOLUTE: 0.1 K/UL (ref 0–0.2)
BUN SERPL-MCNC: 18 MG/DL (ref 8–23)
CALCIUM SERPL-MCNC: 8.4 MG/DL (ref 8.6–10.4)
CHLORIDE SERPL-SCNC: 103 MMOL/L (ref 98–107)
CO2 SERPL-SCNC: 22 MMOL/L (ref 20–31)
CREAT SERPL-MCNC: 0.62 MG/DL (ref 0.5–0.9)
EOSINOPHILS RELATIVE PERCENT: 9 % (ref 0–4)
GFR SERPL CREATININE-BSD FRML MDRD: >60 ML/MIN/1.73M2
GLUCOSE BLD-MCNC: 182 MG/DL (ref 65–105)
GLUCOSE SERPL-MCNC: 196 MG/DL (ref 70–99)
HCT VFR BLD AUTO: 33.6 % (ref 36–46)
HGB BLD-MCNC: 11.4 G/DL (ref 12–16)
LYMPHOCYTES # BLD: 26 % (ref 24–44)
MCH RBC QN AUTO: 30.7 PG (ref 26–34)
MCHC RBC AUTO-ENTMCNC: 33.8 G/DL (ref 31–37)
MCV RBC AUTO: 90.6 FL (ref 80–100)
MONOCYTES # BLD: 3 % (ref 1–7)
MORPHOLOGY: ABNORMAL
PDW BLD-RTO: 14.7 % (ref 11.5–14.9)
PLATELET # BLD AUTO: 95 K/UL (ref 150–450)
PMV BLD AUTO: 12.8 FL (ref 6–12)
POTASSIUM SERPL-SCNC: 4.2 MMOL/L (ref 3.7–5.3)
RBC # BLD: 3.7 M/UL (ref 4–5.2)
SEG NEUTROPHILS: 60 % (ref 36–66)
SEGMENTED NEUTROPHILS ABSOLUTE COUNT: 2.88 K/UL (ref 1.3–9.1)
SODIUM SERPL-SCNC: 134 MMOL/L (ref 135–144)
WBC # BLD AUTO: 4.8 K/UL (ref 3.5–11)

## 2023-04-04 PROCEDURE — 6370000000 HC RX 637 (ALT 250 FOR IP): Performed by: NURSE PRACTITIONER

## 2023-04-04 PROCEDURE — 97110 THERAPEUTIC EXERCISES: CPT

## 2023-04-04 PROCEDURE — 6360000002 HC RX W HCPCS: Performed by: NURSE PRACTITIONER

## 2023-04-04 PROCEDURE — 82947 ASSAY GLUCOSE BLOOD QUANT: CPT

## 2023-04-04 PROCEDURE — 85025 COMPLETE CBC W/AUTO DIFF WBC: CPT

## 2023-04-04 PROCEDURE — 99239 HOSP IP/OBS DSCHRG MGMT >30: CPT | Performed by: INTERNAL MEDICINE

## 2023-04-04 PROCEDURE — 97116 GAIT TRAINING THERAPY: CPT

## 2023-04-04 PROCEDURE — 36415 COLL VENOUS BLD VENIPUNCTURE: CPT

## 2023-04-04 PROCEDURE — 80048 BASIC METABOLIC PNL TOTAL CA: CPT

## 2023-04-04 RX ORDER — ASPIRIN 81 MG/1
81 TABLET ORAL DAILY
Qty: 30 TABLET | Refills: 0 | Status: SHIPPED | OUTPATIENT
Start: 2023-04-04

## 2023-04-04 RX ORDER — HYDROCODONE BITARTRATE AND ACETAMINOPHEN 5; 325 MG/1; MG/1
1 TABLET ORAL EVERY 6 HOURS PRN
Qty: 12 TABLET | Refills: 0 | Status: SHIPPED | OUTPATIENT
Start: 2023-04-04 | End: 2023-04-07

## 2023-04-04 RX ADMIN — RANOLAZINE 500 MG: 500 TABLET, FILM COATED, EXTENDED RELEASE ORAL at 07:46

## 2023-04-04 RX ADMIN — ENOXAPARIN SODIUM 40 MG: 100 INJECTION SUBCUTANEOUS at 07:48

## 2023-04-04 RX ADMIN — PANTOPRAZOLE SODIUM 40 MG: 40 TABLET, DELAYED RELEASE ORAL at 06:10

## 2023-04-04 RX ADMIN — ALOGLIPTIN 12.5 MG: 12.5 TABLET, FILM COATED ORAL at 07:46

## 2023-04-04 RX ADMIN — ASPIRIN 81 MG: 81 TABLET, COATED ORAL at 07:46

## 2023-04-04 RX ADMIN — ISOSORBIDE MONONITRATE 30 MG: 30 TABLET, EXTENDED RELEASE ORAL at 07:46

## 2023-04-04 RX ADMIN — ATORVASTATIN CALCIUM 10 MG: 10 TABLET, FILM COATED ORAL at 07:46

## 2023-04-04 RX ADMIN — METOPROLOL SUCCINATE 12.5 MG: 25 TABLET, EXTENDED RELEASE ORAL at 07:46

## 2023-04-04 RX ADMIN — CLOPIDOGREL BISULFATE 75 MG: 75 TABLET ORAL at 07:46

## 2023-04-04 NOTE — CARE COORDINATION
Catheter removed per MD order.
ONGOING DISCHARGE PLAN:    Patient is alert and oriented x4. Spoke with patient regarding discharge plan and patient confirms that plan is still home [facility]. Continues to decline SNF because she will not leave her 80year old  who is blind. CT abd/pelvis completed, awaiting results. Will continue to follow for additional discharge needs. If patient is discharged prior to next notation, then this note serves as note for discharge by case management.     Electronically signed by Kathy Belle RN on 4/3/2023 at 4:00 PM
ONGOING DISCHARGE PLAN:    Patient is alert and oriented x4. Spoke with patient regarding discharge plan and patient confirms that plan is still to go home with [facility]. Pt denies need for SNF because she care for her . Brittany Nenita from [facility] notified of discharge today. Follow up appt with Dr Angelic Fontenot for 4/17 at 2:00pm.     Will continue to follow for additional discharge needs. If patient is discharged prior to next notation, then this note serves as note for discharge by case management.     Electronically signed by Jaden Samuel RN on 4/4/2023 at 11:15 AM
to Return to Present Housing: Unknown at present  Other Identified Issues/Barriers to RETURNING to current housing: yes, Pt has had multiple falls and states she cannot walk at this time. Pt is adamantly refusing SNF. States she cannot leave her  who is legally blind and is 95. Pt is his caregiver. Potential Assistance needed at discharge: [address] (pt is agreeable to [facility]. She has used them in the past. referral sent.)            Potential DME:    Patient expects to discharge to: Trailer/mobile home  Plan for transportation at discharge: Friends    Financial    Payor: MEDICARE / Plan: MEDICARE PART A AND B / Product Type: *No Product type* /     Does insurance require precert for SNF: No    Potential assistance Purchasing Medications: No  Meds-to-Beds request:        [address] [place] [address] - [address] [place] [address]  Phone: [phone] Fax: [phone]      Notes:    Factors facilitating achievement of predicted outcomes: Friend support, Motivated, Cooperative, and Pleasant    Barriers to discharge: No caregiver support, Limited safety awareness, Upper extremity weakness, Lower extremity weakness, and refusing rehab    Additional Case Management Notes: 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 DME: walker, cane, ramp, straight cath's. Agreeable to [facility]. Refuses SNF - won't leave her . The Plan for Transition of Care is related to the following treatment goals of Inability to walk [R26.2]  Fall, initial encounter [W19. XXXA]    IF APPLICABLE: The Patient and/or patient representative Uriel Burks and her family were provided with a choice of provider and agrees with the discharge plan.  Freedom of choice list with basic dialogue that supports the patient's individualized plan of care/goals and shares the quality data associated with the providers was provided to: Patient   Patient
Assessment:  Last Vital Signs: /62   Pulse 60   Temp 97.3 °F (36.3 °C)   Resp 18   Ht 4' 9\" (1.448 m)   Wt 152 lb (68.9 kg)   SpO2 97%   BMI 32.89 kg/m²     Last documented pain score (0-10 scale): Pain Level: 10  Last Weight:   Wt Readings from Last 1 Encounters:   04/01/23 152 lb (68.9 kg)     Mental Status:  oriented and alert    IV Access:  - None    Nursing Mobility/ADLs:  Walking   Assisted  Transfer  Assisted  Bathing  Independent  Dressing  Independent  Toileting  Independent  Feeding  [address]  Independent  Med Delivery   whole    Wound Care Documentation and Therapy:        Elimination:  Continence: Bowel: Yes  Bladder: Yes  Urinary Catheter: Patient straight caths self at home   Colostomy/Ileostomy/Ileal Conduit: No       Date of Last BM:     Intake/Output Summary (Last 24 hours) at 4/3/2023 1600  Last data filed at 4/3/2023 0943  Gross per 24 hour   Intake --   Output 450 ml   Net -450 ml     I/O last 3 completed shifts:  In: -   Out: 975 [Urine:975]    Safety Concerns:     History of Falls (last 30 days) and At Risk for Falls    Impairments/Disabilities:      Vision and Hearing    Nutrition Therapy:  Current Nutrition Therapy:   - Oral Diet:  Carb Control 4 carbs/meal (1800kcals/day)    Routes of Feeding: Oral  Liquids: No Restrictions  Daily Fluid Restriction: no  Last Modified Barium Swallow with Video (Video Swallowing Test): not done    Treatments at the Time of Hospital Discharge:   Respiratory Treatments: N/A  Oxygen Therapy:  is not on home oxygen therapy. Ventilator:    - No ventilator support    Rehab Therapies: Physical Therapy and Occupational Therapy  Weight Bearing Status/Restrictions: No weight bearing restrictions  Other Medical Equipment (for information only, NOT a DME order):  cane and walker  Other Treatments: Skilled Nursing assessment and monitoring. Medication education and monitoring per protocol.        Patient's personal belongings (please select all

## 2023-04-04 NOTE — DISCHARGE SUMMARY
COMPARISON: CT 03/15/2023 and 11/01/2019 HISTORY: ORDERING SYSTEM PROVIDED HISTORY:  Post fall pain in right abdomen. TECHNOLOGIST PROVIDED HISTORY: Post fall pain in right abdomen. Reason for Exam:  Post fall pain in right abdomen. Relevant Medical/Surgical History:  Lumbar fusion, cardiac cath, hysterctomy. FINDINGS: Lower Chest: Bibasilar atelectasis. There is a stable 7 mm subsolid nodule in the right middle lobe (image 6). Organs: The unenhanced liver, spleen, pancreas, gallbladder and adrenal glands are otherwise without new focal abnormality. Bilateral parapelvic cysts. There is mild nonspecific perinephric stranding. No hydronephrosis. No renal or ureteral calculus. GI/Bowel: Small hiatal hernia. No dilated loops of bowel or bowel wall thickening. No free air. Colonic diverticulosis without acute diverticulitis. The appendix is not visualized. Pelvis: No pathologically enlarged adenopathy or free fluid. Status post hysterectomy. Bladder is not well-distended. Peritoneum/Retroperitoneum: Aorta is normal in caliber with moderate to severe atherosclerosis. No pathologically enlarged adenopathy. No ascites or drainable fluid collection. Bones/Soft Tissues: Several foci of gas are noted within the subcutaneous soft tissues and may be related to recent injury or injection sites. There is mild subcutaneous stranding predominately within the right gluteal region. Neurostimulator device is noted. Postsurgical changes are noted in the right proximal femur and lumbar spine. Age-indeterminate but likely remote fractures of the anterior right 5th rib, partially visualized. The stable 12 mm anterolisthesis at L5-S1.     1. Several foci of gas are noted in the subcutaneous soft tissues and may be related to recent injury or injection sites. There is also mild subcutaneous stranding predominately in the right gluteal region and can be correlated with area of pain.  2. Otherwise no acute abdominal or pelvic

## 2023-04-04 NOTE — DISCHARGE INSTR - DIET

## 2023-04-04 NOTE — PLAN OF CARE
Problem: Discharge Planning  Goal: Discharge to home or other facility with appropriate resources  Outcome: Completed     Problem: Skin/Tissue Integrity  Goal: Absence of new skin breakdown  Description: 1. Monitor for areas of redness and/or skin breakdown  2. Assess vascular access sites hourly  3. Every 4-6 hours minimum:  Change oxygen saturation probe site  4. Every 4-6 hours:  If on nasal continuous positive airway pressure, respiratory therapy assess nares and determine need for appliance change or resting period.   Outcome: Completed     Problem: Safety - Adult  Goal: Free from fall injury  Outcome: Completed     Problem: ABCDS Injury Assessment  Goal: Absence of physical injury  Outcome: Completed     Problem: Pain  Goal: Verbalizes/displays adequate comfort level or baseline comfort level  Outcome: Completed     Problem: Chronic Conditions and Co-morbidities  Goal: Patient's chronic conditions and co-morbidity symptoms are monitored and maintained or improved  Outcome: Completed

## 2023-04-04 NOTE — PROGRESS NOTES
Dr Toan Gonzalez updated on excruciating pain this am right flank to right groin abdomen area. Repositioned and will give norco for now, Dr Toan Gonzalez to see her shortly.
Patient arrives from ED via stretcher and into bed. States significant pain at RLE with any movement, from thigh to foot rate 6/10. Pt alert and oriented, pleasant, talkative. States does straight cath at home related to \"bladder suspension problems\" and does have Winters catheter in place now. L knee and R shin both with ecchymotic areas, patient states did have another fall approximately 2 1/2 weeks ago. Coccyx pink without open area noted, instructed patient to change position frequently with understanding verbalized. Pt states did trip on own feet while at home and had to come to ED. Pt does slightly stutter at times. Blood glucose checked and result 188. Pt states is a diabetic although does not follow a specific diabetic diet. States friend is going to visit this morning and pt will give purse to her. Call light within reach, bed in low position.
Patient discharged home with all personal belongings. Discharge instructions and prescriptions given. Patient taken down to front entrance via wheelchair.
Patient lost IV access.  Okay to leave IV out per Zeina Pelayo
Physical Therapy    Facility/Department: [facility] MED SURG  Physical Therapy Initial Assessment    Name: Howard Munoz  : 1940  MRN: [id number]  Date of Service: 2023    Discharge Recommendations:  Patient would benefit from continued therapy after discharge          Patient Diagnosis(es): The encounter diagnosis was Fall, initial encounter. Past Medical History:  has a past medical history of Age-related cognitive decline, Ankle pain, Anxiety, B12 deficiency, Winters esophagus, 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HCC), Caffeine use, Chronic back pain, Chronic ITP (idiopathic thrombocytopenia) (HCC), CVA (cerebral infarction), Diabetic gastroparesis ([address] Diverticular disease, GERD (gastroesophageal reflux disease), Hiatal hernia, Hip fracture ([address] History of blood transfusion, History of decreased platelet count, Hyperlipemia, Hypertension, Internal hemorrhoid, Macular degeneration of left eye, Nausea and vomiting, Neuropathy, Osteoarthritis, Ringing in ears, Self-catheterizes urinary bladder, TIA (transient ischemic attack), Tubular adenoma, Type II or unspecified type diabetes mellitus without mention of complication, not stated as uncontrolled, Urinary incontinence, Wears dentures, and Wears glasses. Past Surgical History:  has a past surgical history that includes bladder suspension (); Hysterectomy (); Tonsillectomy (); Appendectomy (); Spine surgery (); colostomy (); Revision Colostomy (); Spine surgery (); Upper gastrointestinal endoscopy; Bladder surgery; Upper gastrointestinal endoscopy (2014); cardiovascular stress test (2014); Colon surgery; Total abdominal hysterectomy w/ bilateral salpingoophorectomy; fracture surgery (Right, ); fracture surgery (Left, ); fracture surgery (Left, ); Cardiac catheterization (); Revision total knee arthroplasty (Right, 10/27/2015); Colonoscopy (2016);  Upper gastrointestinal endoscopy (2016); lumbar
Writer responded to consult for prayer. Pt's daughter and spouse were with her and they were waiting to hear POC. Pt said she was less distressed than she had been. Very appreciative of prayer. 04/01/23 1206   Encounter Summary   Encounter Overview/Reason  Spiritual/Emotional Needs   Service Provided For: Patient and family together   Referral/Consult From: Nurse   Support System Children;Spouse; Spiritism/zaida community   Last Encounter  04/01/23   Complexity of Encounter Moderate   Spiritual/Emotional needs   Type Spiritual Support   Assessment/Intervention/Outcome   Assessment Calm   Intervention Active listening;Discussed illness injury and its impact; Explored/Affirmed feelings, thoughts, concerns; Discussed relationship with God;Prayer (assurance of)/[place];Sustaining Presence/Ministry of presence   Outcome Engaged in conversation;Expressed feelings, needs, and concerns;Expressed Gratitude;Receptive
2011); fracture surgery (Left, 2001); fracture surgery (Left, 2013); Cardiac catheterization (2008); Revision total knee arthroplasty (Right, 10/27/2015); Colonoscopy (04/07/2016); Upper gastrointestinal endoscopy (04/07/2016); lumbar fusion (2017); Cystocopy (09/08/2017); Cataract removal with implant (Left, 11/07/2017); pr xcapsl ctrc rmvl insj io lens prosth w/o ecp (Left, 11/07/2017); Cataract removal with implant (Right, 11/28/2017); pr xcapsl ctrc rmvl insj io lens prosth w/o ecp (Right, 11/28/2017); Upper gastrointestinal endoscopy (N/A, 07/17/2018); joint replacement (Bilateral, 2000); hernia repair; Colonoscopy (N/A, 11/06/2019); Revision total knee arthroplasty (Left, 07/14/2020); Colonoscopy (11/06/2019); and Coronary angioplasty with stent (08/04/2022). Restrictions  Restrictions/Precautions  Restrictions/Precautions: Fall Risk;Bed Alarm (chair alarm)  Implants present? : Metal implants (R hip ORIF, Bilat TKA)      Vitals  Vitals  Heart Rate: 72  BP: (!) 144/64  MAP (Calculated): 91  Resp: 18  SpO2: 96 %     Subjective  Subjective: \"I would if I could, but I just can't\"  Comments: Pt reporting \"12/10\" pain in R hip/pelvis in the AM, unable to tolerate out of bed activity. OT discussed with her potential need for SNF but she reports that she cannot leave her spouse for any length of time. Pt understands the recommendation but is adamant with her concerns for her spouse. Subjective  Pain: Pt reported pain in R hip/pelvis/low back at \"12\" on 0-10 scale in AM and was unable to tolerate out of bed activity; Pt hesitant to take anything \"addictive\" but was agreeable to an ice pack.   When OT returned in PM, pt reported that ice helped significantly and that pain was now a 2/10    Social/Functional History  Social/Functional History  Lives With: Spouse  Type of Home: Mobile home  Home Layout: One level  Home Access: Ramped entrance  Bathroom Shower/Tub: Walk-in shower  Bathroom Toilet: Handicap
B12 deficiency     Winters esophagus     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[facility] 1990    left with urinary incontinenc post surgical    Caffeine use     2 coffee/day, 1-2 tea per week    Chronic back pain     Chronic ITP (idiopathic thrombocytopenia) (HCC)     CVA (cerebral infarction) 2008, 2010    balance issues, short term memory loss    Diabetic gastroparesis ([facility]     Diverticular disease 1986    GERD (gastroesophageal reflux disease)     Hiatal hernia     Hip fracture (HCC)     History of blood transfusion     History of decreased platelet count     Hyperlipemia     Hypertension     Internal hemorrhoid     Macular degeneration of left eye 1980's    S/P laser treatment    Nausea and vomiting     Neuropathy     Osteoarthritis     Ringing in ears     Self-catheterizes urinary bladder     6-7 times daily    TIA (transient ischemic attack) 2000    x1    Tubular adenoma     colon polyps    Type II or unspecified type diabetes mellitus without mention of complication, not stated as uncontrolled     Urinary incontinence     frequent UTI s/p infection, surgical dilatation lead to incontinence    Wears dentures     full on top, partial on bottom    Wears glasses         Past Surgical History:     Past Surgical History:   Procedure Laterality Date    APPENDECTOMY  1962    BLADDER SURGERY      bladder stimulator    BLADDER SUSPENSION  2002    multiple    CARDIAC CATHETERIZATION  2008    no stents    CARDIOVASCULAR STRESS TEST  12/2014    CATARACT REMOVAL WITH IMPLANT Left 11/07/2017    [facility]    CATARACT REMOVAL WITH IMPLANT Right 11/28/2017    [facility]    COLON SURGERY      colostomy reversal    COLONOSCOPY  04/07/2016    tubular adenoma x3; internal hemorrhoids    COLONOSCOPY N/A 11/06/2019    COLONOSCOPY DIAGNOSTIC performed by Darlyn Nava MD at . [facility]  11/06/2019    COLOSTOMY  1986    bowel obstruction/ rupture from diverticular disease, reversal 3 mos later    CORONARY
3-5 steps with a railing?: A Lot  AM-PAC Inpatient Mobility Raw Score : 18  AM-PAC Inpatient T-Scale Score : 43.63  Mobility Inpatient CMS 0-100% Score: 46.58  Mobility Inpatient CMS G-Code Modifier : CK    Goals  Short Term Goals  Time Frame for Short Term Goals: 10 visits  Short Term Goal 1: Mod Ind bed mobiltiy with HOB lfat  Short Term Goal 2: Mod in transfers with RW for bed/chair/toilet with RW  Short Term Goal 3: amb with RW 30-50 ft with SBA including turns and obstacles without LOB  Short Term Goal 4: fair (+) standing balance with 1 hand support for ADL  Patient Goals   Patient Goals : go home    Education  Patient Education  Education Given To: Patient; Family  Education Provided: Precautions;Plan of Care;Role of Therapy;Transfer Training; Fall Prevention Strategies  Education Provided Comments: Edu provided on importance of continued ther ex as able throughout the day.   Education Method: Verbal  Education Outcome: Continued education needed    Therapy Time   Individual Concurrent Group Co-treatment   Time In 2933         Time Out 0954         Minutes Jovanny Ruelas [address]
Mobility Raw Score : 18  AM-PAC Inpatient T-Scale Score : 43.63  Mobility Inpatient CMS 0-100% Score: 46.58  Mobility Inpatient CMS G-Code Modifier : CK    Goals  Short Term Goals  Time Frame for Short Term Goals: 10 visits  Short Term Goal 1: Mod Ind bed mobiltiy with HOB lfat  Short Term Goal 2: Mod in transfers with RW for bed/chair/toilet with RW  Short Term Goal 3: amb with RW 30-50 ft with SBA including turns and obstacles without LOB  Short Term Goal 4: fair (+) standing balance with 1 hand support for ADL  Patient Goals   Patient Goals : go home    Education  Patient Education  Education Given To: Patient; Family  Education Provided: Precautions;Plan of Care;Role of Therapy;Transfer Training; Fall Prevention Strategies  Education Provided Comments: Edu provided on importance of continued ther ex as able throughout the day.   Education Method: Verbal  Education Outcome: Continued education needed    Therapy Time   Individual Concurrent Group Co-treatment   Time In 1124         Time Out 0935         Minutes [address] [place], [place]
(XYLOCAINE) 5 % ointment  22   Historical Provider, MD   acetaminophen (TYLENOL) 500 MG tablet Take 2 tablets by mouth every 6 hours as needed for Pain 3/20/22   Yury Moya MD   D-Mannose 350 MG CAPS Take 300 mg by mouth daily 11/3/21 2/9/23  Ai May MD   lidocaine (XYLOCAINE) 2 % jelly APPLY TOPICALLY AS NEEDED 3/29/21   Historical Provider, MD   albuterol (PROVENTIL) (5 MG/ML) 0.5% nebulizer solution Inhale 0.5 mLs into the lungs as needed   Patient not taking: Reported on 2023    Historical Provider, MD   hydrocortisone (ANUSOL-HC) 2.5 % CREA rectal cream Place rectally 2 times daily 21   Donato Lima MD   Continuous Blood Gluc Sensor (FREESTYLE DAVIS 14 DAY SENSOR) MIS  20   Historical Provider, MD   Continuous Blood Gluc  (FREESTYLE DAVIS 14 DAY READER) MATTHEW  19   Historical Provider, MD   CRANBERRY PO Take by mouth 2 times daily    Historical Provider, MD        Allergies: Iodides, Povidone-iodine, Sulfa antibiotics, Betadine [povidone iodine], Cephalexin, Cephalexin, Cozaar [losartan], Cymbalta [duloxetine hcl], Demerol, Doxycycline, Meperidine, Morphine, Penicillins, Zithromax [azithromycin], Ciprofloxacin, Clindamycin/lincomycin, Etodolac, Fluorescein, Iodine, Lisinopril, Penicillin g, Soap, Toviaz [fesoterodine fumarate er], Clonazepam, and Metformin and related    Social History:     Tobacco:    reports that she quit smoking about 40 years ago. Her smoking use included cigarettes. She has a 15.00 pack-year smoking history. She quit smokeless tobacco use about 40 years ago. Alcohol:      reports no history of alcohol use. Drug Use:  reports no history of drug use.     Family History:     Family History   Problem Relation Age of Onset    Breast Cancer Mother     Cancer Mother         breast and uterine  39    Heart Disease Father     Heart Attack Father          28    Cancer Maternal Grandmother     Cancer Brother 46        bronchogenic
Absolute Lymph # 1.02 1.0 - 4.8 k/uL    Absolute Mono # 0.48 0.1 - 1.3 k/uL    Absolute Eos # 0.18 0.0 - 0.4 k/uL    Basophils Absolute 0.06 0.0 - 0.2 k/uL    Morphology GIANT PLATELETS    POC Glucose Fingerstick    Collection Time: 04/02/23  7:30 AM   Result Value Ref Range    POC Glucose 187 (H) 65 - 105 mg/dL   POC Glucose Fingerstick    Collection Time: 04/02/23 10:56 AM   Result Value Ref Range    POC Glucose 183 (H) 65 - 105 mg/dL       Imaging/Diagnostics:        Assessment :      Primary Problem  Inability to walk    Active Hospital Problems    Diagnosis Date Noted    Inability to walk [R26.2] 03/31/2023       Plan:     Patient status Admit as inpatient in the  Med/Surge    Patient history of coronary artery disease, restarted home dose of Ranexa, Lopressor, Plavix  Diabetes, controlled, sliding scale  Patient does self-catheterization for long period of time, she told me she cannot walk, will keep Winters's catheter for 1 more day, likely try to remove Winters's tomorrow  On DVT prophylaxis Lovenox  History of recurrent UTIs  Working with PT/OT     4/2  Patient, clinically doing better  Working with PT  Discussed case with physical therapist, patient is doing much better  We will DC Winters's catheter  Straight cath as needed  DC plan tomorrow  Patient take care of her 49-year-old , she cannot go to nursing home  Consultations:   IP CONSULT TO Wyatt Feldman    Patient is admitted as inpatient status because of co-morbidities listed above, severity of signs and symptoms as outlined, requirement for current medical therapies and most importantly because of direct risk to patient if care not provided in a hospital setting. Raúl Aparicio MD  4/2/2023  2:33 PM    Copy sent to Dr. Rafa Moffett MD    Please note that this chart was generated using voice recognition Dragon dictation software.   Although every effort was made to ensure the accuracy of this automated
mmol/L    Anion Gap 9 9 - 17 mmol/L   CBC with Auto Differential    Collection Time: 04/04/23  5:23 AM   Result Value Ref Range    WBC 4.8 3.5 - 11.0 k/uL    RBC 3.70 (L) 4.0 - 5.2 m/uL    Hemoglobin 11.4 (L) 12.0 - 16.0 g/dL    Hematocrit 33.6 (L) 36 - 46 %    MCV 90.6 80 - 100 fL    MCH 30.7 26 - 34 pg    MCHC 33.8 31 - 37 g/dL    RDW 14.7 11.5 - 14.9 %    Platelets 95 (L) 217 - 450 k/uL    MPV 12.8 (H) 6.0 - 12.0 fL    Seg Neutrophils 60 36 - 66 %    Lymphocytes 26 24 - 44 %    Monocytes 3 1 - 7 %    Eosinophils % 9 (H) 0 - 4 %    Basophils 2 0 - 2 %    Segs Absolute 2.88 1.3 - 9.1 k/uL    Absolute Lymph # 1.25 1.0 - 4.8 k/uL    Absolute Mono # 0.14 0.1 - 1.3 k/uL    Absolute Eos # 0.43 (H) 0.0 - 0.4 k/uL    Basophils Absolute 0.10 0.0 - 0.2 k/uL    Morphology ANISOCYTOSIS PRESENT    POC Glucose Fingerstick    Collection Time: 04/04/23  6:09 AM   Result Value Ref Range    POC Glucose 182 (H) 65 - 105 mg/dL       Imaging/Diagnostics:        Assessment :      Primary Problem  Inability to walk    Active Hospital Problems    Diagnosis Date Noted    Inability to walk [R26.2] 03/31/2023       Plan:     Patient status Admit as inpatient in the  [facility]    Patient history of coronary artery disease, restarted home dose of Ranexa, Lopressor, Plavix  Diabetes, controlled, sliding scale  Patient does self-catheterization for long period of time, she told me she cannot walk, will keep Winters's catheter for 1 more day, likely try to remove Winters's tomorrow  On DVT prophylaxis Lovenox  History of recurrent UTIs  Working with PT/OT     4/2  Patient, clinically doing better  Working with PT  Discussed case with physical therapist, patient is doing much better  We will DC Winters's catheter  Straight cath as needed  DC plan tomorrow  Patient take care of her 80-year-old , she cannot go to nursing home    4.3  Patient complaining of excruciating pain in pelvic ring mainly on the right side  Ordering CT pelvis  Patient given

## 2023-04-05 ENCOUNTER — CARE COORDINATION (OUTPATIENT)
Dept: CASE MANAGEMENT | Age: 83
End: 2023-04-05

## 2023-04-05 DIAGNOSIS — R26.2 INABILITY TO WALK: Primary | ICD-10-CM

## 2023-04-05 PROCEDURE — 1111F DSCHRG MED/CURRENT MED MERGE: CPT | Performed by: FAMILY MEDICINE

## 2023-04-05 NOTE — CARE COORDINATION
REHABILITATION [facility] Care Transitions Initial Follow Up Call    Call within 2 business days of discharge: Yes    Patient Current Location:  Home: [address] [place] [address] Transition Nurse contacted the patient by telephone to perform post hospital discharge assessment. Verified name and  with patient as identifiers. Provided introduction to self, and explanation of the Care Transition Nurse role. Patient: Kaycee Telol Patient : 1940   MRN: [id number]  Reason for Admission: Inability to walk, S/P fall  Discharge Date: 23 RARS: Readmission Risk Score: 15.6      Last Discharge [address]       Date Complaint Diagnosis Description Type Department Provider    3/31/23 Fall; Knee Pain Fall, initial encounter . .. ED to Hosp-Admission (Discharged) (ADMITTED) Coleen Turner MD; Meryl White. .. Was this an external facility discharge? No Discharge Facility: [facility]    Challenges to be reviewed by the provider   Additional needs identified to be addressed with provider: No  none               Method of communication with provider: none. Spoke with patient today, she reports she is glad to be back home, doing better and denies having much pain to her leg today. Patient states she was at home, tripped over own feet and fell forward hitting face and leg. She denies any facial pain, states her pelvic area by hip is what is sore. She had x-rays done and no abnormality or fractures seen. Patient admits to having a few falls at home, states she is \"clumsy\". She denies having any dizziness or lightheadedness prior to falling. Discharge instructions reviewed, patient will have [address] care to follow, verified receipt of referral with Alexandru. Patient ambulates with rolling walker, is able to walk today with her device.   Patient also self cath's at home about 7 times per day, recently was treated for UTI but currently no urinary symptoms, no f/c, n/v or other s/s of

## 2023-04-17 ENCOUNTER — CARE COORDINATION (OUTPATIENT)
Dept: CASE MANAGEMENT | Age: 83
End: 2023-04-17

## 2023-04-17 ENCOUNTER — OFFICE VISIT (OUTPATIENT)
Dept: PRIMARY CARE CLINIC | Age: 83
End: 2023-04-17
Payer: MEDICARE

## 2023-04-17 VITALS
HEART RATE: 73 BPM | BODY MASS INDEX: 33.74 KG/M2 | HEIGHT: 57 IN | SYSTOLIC BLOOD PRESSURE: 128 MMHG | RESPIRATION RATE: 16 BRPM | OXYGEN SATURATION: 98 % | WEIGHT: 156.4 LBS | DIASTOLIC BLOOD PRESSURE: 82 MMHG

## 2023-04-17 DIAGNOSIS — E11.51 TYPE 2 DIABETES MELLITUS WITH DIABETIC PERIPHERAL ANGIOPATHY WITHOUT GANGRENE, WITHOUT LONG-TERM CURRENT USE OF INSULIN (HCC): ICD-10-CM

## 2023-04-17 DIAGNOSIS — K21.9 GASTROESOPHAGEAL REFLUX DISEASE, UNSPECIFIED WHETHER ESOPHAGITIS PRESENT: ICD-10-CM

## 2023-04-17 DIAGNOSIS — Z00.00 INITIAL MEDICARE ANNUAL WELLNESS VISIT: Primary | ICD-10-CM

## 2023-04-17 DIAGNOSIS — I25.10 CORONARY ARTERY DISEASE INVOLVING NATIVE CORONARY ARTERY OF NATIVE HEART WITHOUT ANGINA PECTORIS: ICD-10-CM

## 2023-04-17 PROBLEM — Z95.5 PRESENCE OF DRUG-ELUTING STENT IN RIGHT CORONARY ARTERY: Status: ACTIVE | Noted: 2022-10-12

## 2023-04-17 PROCEDURE — 3079F DIAST BP 80-89 MM HG: CPT | Performed by: FAMILY MEDICINE

## 2023-04-17 PROCEDURE — 3074F SYST BP LT 130 MM HG: CPT | Performed by: FAMILY MEDICINE

## 2023-04-17 PROCEDURE — G0438 PPPS, INITIAL VISIT: HCPCS | Performed by: FAMILY MEDICINE

## 2023-04-17 PROCEDURE — 1123F ACP DISCUSS/DSCN MKR DOCD: CPT | Performed by: FAMILY MEDICINE

## 2023-04-17 RX ORDER — ESOMEPRAZOLE MAGNESIUM 40 MG/1
40 CAPSULE, DELAYED RELEASE ORAL
Qty: 90 CAPSULE | Refills: 3 | Status: SHIPPED | OUTPATIENT
Start: 2023-04-17

## 2023-04-17 ASSESSMENT — PATIENT HEALTH QUESTIONNAIRE - PHQ9
7. TROUBLE CONCENTRATING ON THINGS, SUCH AS READING THE NEWSPAPER OR WATCHING TELEVISION: 0
SUM OF ALL RESPONSES TO PHQ QUESTIONS 1-9: 4
SUM OF ALL RESPONSES TO PHQ QUESTIONS 1-9: 4
4. FEELING TIRED OR HAVING LITTLE ENERGY: 0
2. FEELING DOWN, DEPRESSED OR HOPELESS: 1
10. IF YOU CHECKED OFF ANY PROBLEMS, HOW DIFFICULT HAVE THESE PROBLEMS MADE IT FOR YOU TO DO YOUR WORK, TAKE CARE OF THINGS AT HOME, OR GET ALONG WITH OTHER PEOPLE: 1
SUM OF ALL RESPONSES TO PHQ QUESTIONS 1-9: 4
1. LITTLE INTEREST OR PLEASURE IN DOING THINGS: 1
5. POOR APPETITE OR OVEREATING: 1
9. THOUGHTS THAT YOU WOULD BE BETTER OFF DEAD, OR OF HURTING YOURSELF: 0
3. TROUBLE FALLING OR STAYING ASLEEP: 0
8. MOVING OR SPEAKING SO SLOWLY THAT OTHER PEOPLE COULD HAVE NOTICED. OR THE OPPOSITE, BEING SO FIGETY OR RESTLESS THAT YOU HAVE BEEN MOVING AROUND A LOT MORE THAN USUAL: 1
SUM OF ALL RESPONSES TO PHQ9 QUESTIONS 1 & 2: 2
6. FEELING BAD ABOUT YOURSELF - OR THAT YOU ARE A FAILURE OR HAVE LET YOURSELF OR YOUR FAMILY DOWN: 0
SUM OF ALL RESPONSES TO PHQ QUESTIONS 1-9: 4

## 2023-04-17 NOTE — PROGRESS NOTES
APPLY TOPICALLY AS NEEDED Yes Historical Provider, MD   hydrocortisone (ANUSOL-HC) 2.5 % CREA rectal cream Place rectally 2 times daily Yes Kina Chung MD   vitamin B-12 (CYANOCOBALAMIN) 1000 MCG tablet Take 1 tablet by mouth once a week Yes Kina Chung MD   Continuous Blood Gluc Sensor (FREESTYLE DAVIS 14 DAY SENSOR) MISC  Yes Historical Provider, MD   Multiple Vitamins-Minerals (THERAPEUTIC MULTIVITAMIN-MINERALS) tablet Take 1 tablet by mouth daily Yes Historical Provider, MD   Multiple Vitamins-Minerals (PRESERVISION AREDS PO) Take by mouth daily  Yes Historical Provider, MD   Continuous Blood Gluc  (FREESTYLE DAVIS 14 DAY READER) MTATHEW  Yes Historical Provider, MD   CRANBERRY PO Take by mouth 2 times daily Yes Historical Provider, MD   D-Mannose 350 MG CAPS Take 300 mg by mouth daily  Franc Cisneros MD       [facility] (Including outside providers/suppliers regularly involved in providing care):   Patient Care Team:  Kina Chung MD as PCP - General (Family Medicine)  Kina Chung MD as PCP - Empaneled Provider  Nati Garcia MD as Consulting Physician (Neurology)  Manan Sainz MD as Consulting Physician (Urology)  Gildardo Barnett DPM as Consulting Physician (Podiatry)  Kwame Calderon MD as Consulting Physician (Gastroenterology)  Latanya Lees MD as Consulting Physician (Hematology and Oncology)  Lauro Vazquez MD as Consulting Physician (Neurology)  Ayaz Meza MD as Consulting Physician (Orthopedic Surgery)  Raquel Guerrier MD as Surgeon (Neurological Surgery)  Rubi Arroyo MD as Consulting Physician (Gastroenterology)  Sherrie Mccollum RN as Care Transitions Nurse     Reviewed and updated this visit:  Tobacco  Allergies  Meds  Problems  Med Hx  Surg Hx  Soc Hx  Fam Hx               Kina Chung MD

## 2023-04-17 NOTE — PATIENT INSTRUCTIONS
applicable) as well as your Preventive Care list are included within your After Visit Summary for your review. Other Preventive Recommendations:    A preventive eye exam performed by an eye specialist is recommended every 1-2 years to screen for glaucoma; cataracts, macular degeneration, and other eye disorders. A preventive dental visit is recommended every 6 months. Try to get at least 150 minutes of exercise per week or 10,000 steps per day on a pedometer . Order or download the FREE \"Exercise & Physical Activity: Your Everyday Guide\" from [employer]. Call 6-269.939.9097 or search [employer] online. You need 7837-3626 mg of calcium and 5781-1866 IU of vitamin D per day. It is possible to meet your calcium requirement with diet alone, but a vitamin D supplement is usually necessary to meet this goal.  When exposed to the sun, use a sunscreen that protects against both UVA and UVB radiation with an SPF of 30 or greater. Reapply every 2 to 3 hours or after sweating, drying off with a towel, or swimming. Always wear a seat belt when traveling in a car. Always wear a helmet when riding a bicycle or motorcycle.

## 2023-04-17 NOTE — CARE COORDINATION
Care Transitions Outreach Attempt    Noted patient has PCP annual well visit today. Will defer transitions call to another day. Patient: Shaquille Hameed Patient : 1940 MRN: [id number]    Last Discharge  Street       Date Complaint Diagnosis Description Type Department Provider    3/31/23 Fall; Knee Pain Fall, initial encounter . .. ED to Hosp-Admission (Discharged) (ADMITTED) Melanie Marina MD; Janis Ayala. ..               Noted following upcoming appointments from discharge chart review:   [facility] follow up appointment(s):   Future Appointments   Date Time Provider Gera Amanda   2023  1:20 PM Katelyn Lange MD Neuro Spec Neurology -   2023  9:00 AM Nicole Albarado MD INF DIS [facility]   2023 10:15 AM Emerson Mancuso MD [address] [place] [address]     Peri Phillips, RN BSN   Care Transitions Nurse  [phone]

## 2023-04-19 ENCOUNTER — CARE COORDINATION (OUTPATIENT)
Dept: CASE MANAGEMENT | Age: 83
End: 2023-04-19

## 2023-04-19 NOTE — CARE COORDINATION
site of care based on symptoms and resources available to patient including: PCP  Specialist  Home health  When to call [address] The patient agrees to contact the PCP office for questions related to their healthcare. Advance Care Planning:   reviewed and current. Patients top risk factors for readmission: medical condition-inability to walk   falls    Interventions to address risk factors: Obtained and reviewed discharge summary and/or continuity of care documents    Offered patient enrollment in the Remote Patient Monitoring (RPM) program for in-home monitoring: NA.     Care Transitions Subsequent and Final Call    Subsequent and Final Calls  Are you currently active with any services?: Home Health  Care Transitions Interventions  Other Interventions:             LPN Care Coordinator provided contact information for future needs. Plan for follow-up call in 7-10 days based on severity of symptoms and risk factors.   Plan for next call: symptom management-leg groin pain    falls and safety    JACKIE Ruiz LPN Care Transitions Nurse   [phone]

## 2023-04-24 ENCOUNTER — TELEPHONE (OUTPATIENT)
Dept: ORTHOPEDIC SURGERY | Age: 83
End: 2023-04-24

## 2023-04-24 DIAGNOSIS — M96.1 POST LAMINECTOMY SYNDROME: ICD-10-CM

## 2023-04-24 DIAGNOSIS — M50.30 DEGENERATIVE DISC DISEASE, CERVICAL: Primary | ICD-10-CM

## 2023-04-24 DIAGNOSIS — M43.10 ACQUIRED SPONDYLOLISTHESIS: ICD-10-CM

## 2023-04-26 ENCOUNTER — CARE COORDINATION (OUTPATIENT)
Dept: CASE MANAGEMENT | Age: 83
End: 2023-04-26

## 2023-04-26 DIAGNOSIS — E11.51 TYPE 2 DIABETES MELLITUS WITH DIABETIC PERIPHERAL ANGIOPATHY WITHOUT GANGRENE, WITHOUT LONG-TERM CURRENT USE OF INSULIN (HCC): ICD-10-CM

## 2023-04-26 DIAGNOSIS — I25.10 CORONARY ARTERY DISEASE INVOLVING NATIVE CORONARY ARTERY OF NATIVE HEART WITHOUT ANGINA PECTORIS: ICD-10-CM

## 2023-04-26 LAB
CHOLESTEROL/HDL RATIO: 3.8
CHOLESTEROL: 182 MG/DL
HDLC SERPL-MCNC: 48 MG/DL
LDL CHOLESTEROL: 108 MG/DL (ref 0–130)
TRIGL SERPL-MCNC: 130 MG/DL

## 2023-04-26 NOTE — CARE COORDINATION
REHABILITATION [facility] Care Transitions Follow Up Call    Patient Current Location:  Home: [address] [facility]    Care Transition Nurse contacted the patient by telephone to follow up after admission on 3/31/23. Verified name and  with patient as identifiers. Patient: Denise Page  Patient : 1940   MRN: [id number]  Reason for Admission: fall, right hip pain, knee pain  Discharge Date: 23 RARS: Readmission Risk Score: 15.6      Needs to be reviewed by the provider   Additional needs identified to be addressed with provider: No  none             Method of communication with provider: none. Spoke to Mariana for transitions call. Stated she is awaiting call from Pain Management office for appt. Pt continues to have right hip pain, using walker, has PT tomorrow through [address]. Pt is taking ES Tylenol for pain. Pt restarted Nexium and ProAir, taking Tradjenta. Reports BS as 110 today. Denies falls since discharge. Self caths at home, no s/s of infection, good UOP. Addressed changes since last contact:  home health care-PT tomorrow, has pain mgmt referral, awaiting appt  Discussed follow-up appointments. Follow Up  Future Appointments   Date Time Provider Gera Amanda   2023  1:20 PM Edwina Tyler MD Neuro Spec Neurology -   2023  9:00 AM Pritesh Virk MD INF DIS [facility]   2023 10:15 AM Lyle Glasgow MD [address] Transition Nurse reviewed discharge instructions with patient and discussed any barriers to care and/or understanding of plan of care after discharge. Discussed appropriate site of care based on symptoms and resources available to patient including: PCP  Specialist  When to call [address] The patient agrees to contact the PCP office for questions related to their healthcare.     Patients top risk factors for readmission: medical condition-falls, DM2, CAD   Interventions to address risk factors: Obtained and

## 2023-05-03 ENCOUNTER — CARE COORDINATION (OUTPATIENT)
Dept: CASE MANAGEMENT | Age: 83
End: 2023-05-03

## 2023-05-03 NOTE — CARE COORDINATION
[facility] Care Transitions Follow Up Call    Patient Current Location:  Home: [address] [facility]    Care Transition Nurse contacted the patient by telephone to follow up after admission on 3/31/23. Verified name and  with patient as identifiers. Patient: Desi Hernandez  Patient : 1940   MRN: [id number]  Reason for Admission: Fall, inability to walk  Discharge Date: 23 RARS: Readmission Risk Score: 15.6      Needs to be reviewed by the provider   Additional needs identified to be addressed with provider: No  none             Method of communication with provider: none. Spoke with patient today who said she was at [facility] getting her glasses. Patient reports she was seen at pulmonary office today, states she has been having issues with her breathing and shortness of breath. She said pulmonary told her it was not lung related and she will be following up with her cardiologist tomorrow. Patient denies any chest pains, swelling, weight gains or other symptoms. Her right groin/hip pain has improved, states it is no longer as painful as before, is ambulating around and feeling more stable on her feet. Patient denies any new needs or concerns at this time. She still is doing PT/OT, states is progressing well. Addressed changes since last contact:  none  Discussed follow-up appointments. If no appointment was previously scheduled, appointment scheduling offered: No.   Is follow up appointment scheduled within 7 days of discharge?  No.    Follow Up  Future Appointments   Date Time Provider Gera Amanda   2023  1:20 PM Omega Duncan MD Neuro Spec Neurology -   2023  9:00 AM Qian Shabazz MD INF DIS [facility]   2023 10:15 AM Dominguez Bonilla MD [facility]     [facility] follow up appointment(s):  with cardiology    Care Transition Nurse reviewed discharge instructions with patient and discussed any barriers to care and/or understanding of

## 2023-05-04 RX ORDER — ASPIRIN 81 MG/1
TABLET, DELAYED RELEASE ORAL
Qty: 30 TABLET | Refills: 2 | OUTPATIENT
Start: 2023-05-04

## 2023-06-06 ENCOUNTER — HOSPITAL ENCOUNTER (OUTPATIENT)
Age: 83
Discharge: HOME | End: 2023-06-06
Payer: MEDICARE

## 2023-06-06 ENCOUNTER — HOSPITAL ENCOUNTER (OUTPATIENT)
Dept: GENERAL RADIOLOGY | Age: 83
Discharge: HOME | End: 2023-06-08
Payer: MEDICARE

## 2023-06-06 DIAGNOSIS — M79.672 LEFT FOOT PAIN: ICD-10-CM

## 2023-06-06 PROCEDURE — 73630 X-RAY EXAM OF FOOT: CPT

## 2023-06-26 ENCOUNTER — TELEPHONE (OUTPATIENT)
Dept: INFECTIOUS DISEASES | Age: 83
End: 2023-06-26

## 2023-06-26 DIAGNOSIS — N39.0 RECURRENT UTI: Primary | ICD-10-CM

## 2023-06-27 ENCOUNTER — OFFICE VISIT (OUTPATIENT)
Dept: NEUROLOGY | Age: 83
End: 2023-06-27
Payer: MEDICARE

## 2023-06-27 VITALS
HEIGHT: 57 IN | HEART RATE: 73 BPM | DIASTOLIC BLOOD PRESSURE: 82 MMHG | WEIGHT: 154 LBS | BODY MASS INDEX: 33.22 KG/M2 | SYSTOLIC BLOOD PRESSURE: 146 MMHG

## 2023-06-27 DIAGNOSIS — E08.42 DIABETIC POLYNEUROPATHY ASSOCIATED WITH DIABETES MELLITUS DUE TO UNDERLYING CONDITION (HCC): ICD-10-CM

## 2023-06-27 DIAGNOSIS — G25.81 RESTLESS LEGS SYNDROME: ICD-10-CM

## 2023-06-27 DIAGNOSIS — G89.29 CHRONIC LOW BACK PAIN WITH SCIATICA, SCIATICA LATERALITY UNSPECIFIED, UNSPECIFIED BACK PAIN LATERALITY: ICD-10-CM

## 2023-06-27 DIAGNOSIS — G25.0 ESSENTIAL TREMOR: Primary | ICD-10-CM

## 2023-06-27 DIAGNOSIS — M54.40 CHRONIC LOW BACK PAIN WITH SCIATICA, SCIATICA LATERALITY UNSPECIFIED, UNSPECIFIED BACK PAIN LATERALITY: ICD-10-CM

## 2023-06-27 PROCEDURE — 1123F ACP DISCUSS/DSCN MKR DOCD: CPT | Performed by: PSYCHIATRY & NEUROLOGY

## 2023-06-27 PROCEDURE — G8400 PT W/DXA NO RESULTS DOC: HCPCS | Performed by: PSYCHIATRY & NEUROLOGY

## 2023-06-27 PROCEDURE — G8417 CALC BMI ABV UP PARAM F/U: HCPCS | Performed by: PSYCHIATRY & NEUROLOGY

## 2023-06-27 PROCEDURE — 3077F SYST BP >= 140 MM HG: CPT | Performed by: PSYCHIATRY & NEUROLOGY

## 2023-06-27 PROCEDURE — 1036F TOBACCO NON-USER: CPT | Performed by: PSYCHIATRY & NEUROLOGY

## 2023-06-27 PROCEDURE — 3079F DIAST BP 80-89 MM HG: CPT | Performed by: PSYCHIATRY & NEUROLOGY

## 2023-06-27 PROCEDURE — 1090F PRES/ABSN URINE INCON ASSESS: CPT | Performed by: PSYCHIATRY & NEUROLOGY

## 2023-06-27 PROCEDURE — 99214 OFFICE O/P EST MOD 30 MIN: CPT | Performed by: PSYCHIATRY & NEUROLOGY

## 2023-06-27 PROCEDURE — G8427 DOCREV CUR MEDS BY ELIG CLIN: HCPCS | Performed by: PSYCHIATRY & NEUROLOGY

## 2023-06-27 ASSESSMENT — ENCOUNTER SYMPTOMS
ALLERGIC/IMMUNOLOGIC NEGATIVE: 1
GASTROINTESTINAL NEGATIVE: 1
RESPIRATORY NEGATIVE: 1
EYES NEGATIVE: 1
BACK PAIN: 1

## 2023-06-28 ENCOUNTER — HOSPITAL ENCOUNTER (OUTPATIENT)
Age: 83
Setting detail: SPECIMEN
Discharge: HOME | End: 2023-06-28

## 2023-06-28 ENCOUNTER — OFFICE VISIT (OUTPATIENT)
Dept: INFECTIOUS DISEASES | Age: 83
End: 2023-06-28
Payer: MEDICARE

## 2023-06-28 VITALS
DIASTOLIC BLOOD PRESSURE: 82 MMHG | RESPIRATION RATE: 16 BRPM | WEIGHT: 154 LBS | HEART RATE: 77 BPM | BODY MASS INDEX: 33.22 KG/M2 | SYSTOLIC BLOOD PRESSURE: 137 MMHG | TEMPERATURE: 98.7 F | OXYGEN SATURATION: 98 % | HEIGHT: 57 IN

## 2023-06-28 DIAGNOSIS — N39.41 URGE INCONTINENCE: Primary | ICD-10-CM

## 2023-06-28 DIAGNOSIS — N39.41 URGE INCONTINENCE: ICD-10-CM

## 2023-06-28 LAB
BILIRUB UR QL STRIP: NEGATIVE
CASTS #/AREA URNS LPF: NORMAL /LPF (ref 0–8)
CLARITY UR: ABNORMAL
COLOR UR: YELLOW
EPI CELLS #/AREA URNS HPF: NORMAL /HPF (ref 0–5)
GLUCOSE UR STRIP-MCNC: ABNORMAL MG/DL
HGB UR QL STRIP.AUTO: ABNORMAL
KETONES UR STRIP-MCNC: NEGATIVE MG/DL
LEUKOCYTE ESTERASE UR QL STRIP: ABNORMAL
NITRITE UR QL STRIP: NEGATIVE
PH UR STRIP: 5.5 [PH] (ref 5–8)
PROT UR STRIP-MCNC: ABNORMAL MG/DL
RBC #/AREA URNS HPF: NORMAL /HPF (ref 0–4)
SP GR UR STRIP: 1.02 (ref 1–1.03)
UROBILINOGEN UR STRIP-ACNC: NORMAL
WBC #/AREA URNS HPF: NORMAL /HPF (ref 0–5)

## 2023-06-28 PROCEDURE — 1123F ACP DISCUSS/DSCN MKR DOCD: CPT | Performed by: INTERNAL MEDICINE

## 2023-06-28 PROCEDURE — 1090F PRES/ABSN URINE INCON ASSESS: CPT | Performed by: INTERNAL MEDICINE

## 2023-06-28 PROCEDURE — 3075F SYST BP GE 130 - 139MM HG: CPT | Performed by: INTERNAL MEDICINE

## 2023-06-28 PROCEDURE — G8417 CALC BMI ABV UP PARAM F/U: HCPCS | Performed by: INTERNAL MEDICINE

## 2023-06-28 PROCEDURE — 99214 OFFICE O/P EST MOD 30 MIN: CPT | Performed by: INTERNAL MEDICINE

## 2023-06-28 PROCEDURE — G8400 PT W/DXA NO RESULTS DOC: HCPCS | Performed by: INTERNAL MEDICINE

## 2023-06-28 PROCEDURE — 3079F DIAST BP 80-89 MM HG: CPT | Performed by: INTERNAL MEDICINE

## 2023-06-28 PROCEDURE — 0509F URINE INCON PLAN DOCD: CPT | Performed by: INTERNAL MEDICINE

## 2023-06-28 PROCEDURE — 1036F TOBACCO NON-USER: CPT | Performed by: INTERNAL MEDICINE

## 2023-06-28 PROCEDURE — G8427 DOCREV CUR MEDS BY ELIG CLIN: HCPCS | Performed by: INTERNAL MEDICINE

## 2023-06-28 RX ORDER — GRANULES FOR ORAL 3 G/1
3 POWDER ORAL
Qty: 2 EACH | Refills: 0 | Status: SHIPPED | OUTPATIENT
Start: 2023-06-28

## 2023-06-28 RX ORDER — FLUCONAZOLE 100 MG/1
200 TABLET ORAL DAILY
Qty: 14 TABLET | Refills: 0 | Status: SHIPPED | OUTPATIENT
Start: 2023-06-28 | End: 2023-07-05

## 2023-06-30 LAB
MICROORGANISM SPEC CULT: NORMAL
SPECIMEN DESCRIPTION: NORMAL

## 2023-07-17 NOTE — TELEPHONE ENCOUNTER
LAST VISIT:   4/17/2023     Future Appointments   Date Time Provider [address]   7/24/2023  2:25 PM Mandy Coates MD [facility]   8/28/2023 10:15 AM Stanton Simpson MD [address] [place] [address]   8/30/2023  9:30 AM Daysi Altman MD INF DIS [facility]

## 2023-07-18 RX ORDER — PRIMIDONE 50 MG/1
TABLET ORAL
Qty: 180 TABLET | Refills: 0 | Status: SHIPPED | OUTPATIENT
Start: 2023-07-18

## 2023-07-24 ENCOUNTER — OFFICE VISIT (OUTPATIENT)
Dept: ORTHOPEDIC SURGERY | Age: 83
End: 2023-07-24
Payer: MEDICARE

## 2023-07-24 DIAGNOSIS — M25.551 PAIN OF RIGHT HIP: Primary | ICD-10-CM

## 2023-07-24 PROCEDURE — 1123F ACP DISCUSS/DSCN MKR DOCD: CPT | Performed by: ORTHOPAEDIC SURGERY

## 2023-07-24 PROCEDURE — G8428 CUR MEDS NOT DOCUMENT: HCPCS | Performed by: ORTHOPAEDIC SURGERY

## 2023-07-24 PROCEDURE — G8400 PT W/DXA NO RESULTS DOC: HCPCS | Performed by: ORTHOPAEDIC SURGERY

## 2023-07-24 PROCEDURE — G8417 CALC BMI ABV UP PARAM F/U: HCPCS | Performed by: ORTHOPAEDIC SURGERY

## 2023-07-24 PROCEDURE — 1036F TOBACCO NON-USER: CPT | Performed by: ORTHOPAEDIC SURGERY

## 2023-07-24 PROCEDURE — 99213 OFFICE O/P EST LOW 20 MIN: CPT | Performed by: ORTHOPAEDIC SURGERY

## 2023-07-24 PROCEDURE — 1090F PRES/ABSN URINE INCON ASSESS: CPT | Performed by: ORTHOPAEDIC SURGERY

## 2023-07-24 NOTE — PROGRESS NOTES
that the records reflect my personal performance and is accurate and complete. Vicki Kaba MD. 07/24/23      Please note that this chart was generated using voice recognition Dragon dictation software. Although every effort was made to ensure the accuracy of this automated transcription, some errors in transcription may have occurred.

## 2023-08-07 ENCOUNTER — HOSPITAL ENCOUNTER (EMERGENCY)
Age: 83
Discharge: HOME | End: 2023-08-07
Attending: EMERGENCY MEDICINE
Payer: MEDICARE

## 2023-08-07 ENCOUNTER — APPOINTMENT (OUTPATIENT)
Dept: CT IMAGING | Age: 83
End: 2023-08-07
Payer: MEDICARE

## 2023-08-07 ENCOUNTER — TELEPHONE (OUTPATIENT)
Dept: PRIMARY CARE CLINIC | Age: 83
End: 2023-08-07

## 2023-08-07 ENCOUNTER — APPOINTMENT (OUTPATIENT)
Dept: GENERAL RADIOLOGY | Age: 83
End: 2023-08-07
Payer: MEDICARE

## 2023-08-07 VITALS
HEART RATE: 55 BPM | BODY MASS INDEX: 32.68 KG/M2 | SYSTOLIC BLOOD PRESSURE: 158 MMHG | DIASTOLIC BLOOD PRESSURE: 61 MMHG | OXYGEN SATURATION: 96 % | WEIGHT: 151 LBS | RESPIRATION RATE: 16 BRPM | TEMPERATURE: 97 F

## 2023-08-07 DIAGNOSIS — S09.90XA INJURY OF HEAD, INITIAL ENCOUNTER: ICD-10-CM

## 2023-08-07 DIAGNOSIS — S70.01XA CONTUSION OF RIGHT HIP, INITIAL ENCOUNTER: Primary | ICD-10-CM

## 2023-08-07 PROCEDURE — 73502 X-RAY EXAM HIP UNI 2-3 VIEWS: CPT

## 2023-08-07 PROCEDURE — 70450 CT HEAD/BRAIN W/O DYE: CPT

## 2023-08-07 PROCEDURE — 99284 EMERGENCY DEPT VISIT MOD MDM: CPT

## 2023-08-07 ASSESSMENT — ENCOUNTER SYMPTOMS
WHEEZING: 0
SINUS PRESSURE: 0
COLOR CHANGE: 0
CONSTIPATION: 0
EYE PAIN: 0
COUGH: 0
SORE THROAT: 0
BACK PAIN: 0
VOMITING: 0
EYE REDNESS: 0
DIARRHEA: 0
TROUBLE SWALLOWING: 0
BLOOD IN STOOL: 0
EYE DISCHARGE: 0
FACIAL SWELLING: 0
SHORTNESS OF BREATH: 0
RHINORRHEA: 0
CHEST TIGHTNESS: 0
NAUSEA: 0
ABDOMINAL PAIN: 0

## 2023-08-07 ASSESSMENT — VISUAL ACUITY
OU: 20/25
OD: 20/25

## 2023-08-07 ASSESSMENT — LIFESTYLE VARIABLES
HOW OFTEN DO YOU HAVE A DRINK CONTAINING ALCOHOL: NEVER
HOW MANY STANDARD DRINKS CONTAINING ALCOHOL DO YOU HAVE ON A TYPICAL DAY: PATIENT DOES NOT DRINK

## 2023-08-07 NOTE — ED NOTES
Mode of arrival (squad #, walk in, police, etc) : walk in         Chief complaint(s): fall, head injury, Hip RT         Arrival Note (brief scenario, treatment PTA, etc). : pt fell Tuesday hit head pt states being on blood thinners. Pt states blurred vision started on Thursday. Pt states harder time walking         C= \"Have you ever felt that you should Cut down on your drinking? \"  No  A= \"Have people Annoyed you by criticizing your drinking? \"  No  G= \"Have you ever felt bad or Guilty about your drinking? \"   No   E= \"Have you ever had a drink as an Eye-opener first thing in the morning to steady your nerves or to help a hangover? \"  No      Deferred []      Reason for deferring: N/A    *If yes to two or more: probable alcohol abuse. Dmitry Barrera RN  08/07/23 9308

## 2023-08-07 NOTE — ED PROVIDER NOTES
PRESCRIPTIONS:  Current Discharge Medication List        PHYSICIAN CONSULTS ORDERED THIS ENCOUNTER:  None    FINAL IMPRESSION      1. Contusion of right hip, initial encounter    2. Injury of head, initial encounter          DISPOSITION/PLAN   DISPOSITION Decision To Discharge 08/07/2023 01:09:25 PM      PATIENT REFERREDTO:  Joseph Rushing MD  [address] [facility].   [address]    In 1 week      [facility] ED  [address]  [facility] [address]  [phone]    If symptoms worsen      DISCHARGEMEDICATIONS:  Current Discharge Medication List          (Please note that portions of this note were completed with a voice recognition program.  Efforts were made to edit thedictations but occasionally words are mis-transcribed.)    Patrice Nolen MD  Attending Emergency Physician                       Patrice Nolen MD  08/07/23 [id number]

## 2023-08-07 NOTE — TELEPHONE ENCOUNTER
Pt fell 5 days ago. Went to sit on her walker and forgot to lock it and it went out from under her. Pt fell flat on her back and hit her head on concrete. Now starting to have head pain and memory issues. Advised ER, but she really doesn't want to go to the ER. Will, if you recommend it. Can she schedule appt, or advise ER? Dr. Nelly Petersen pt.

## 2023-08-11 ENCOUNTER — HOSPITAL ENCOUNTER (OUTPATIENT)
Dept: INTERVENTIONAL RADIOLOGY/VASCULAR | Age: 83
End: 2023-08-11
Attending: ORTHOPAEDIC SURGERY
Payer: MEDICARE

## 2023-08-11 DIAGNOSIS — M25.551 PAIN OF RIGHT HIP: ICD-10-CM

## 2023-08-11 PROCEDURE — 2709999900 IR ARTHR/ASP/INJ MAJOR JT/BURSA RIGHT WO US

## 2023-08-11 PROCEDURE — 6360000004 HC RX CONTRAST MEDICATION: Performed by: RADIOLOGY

## 2023-08-11 PROCEDURE — 20610 DRAIN/INJ JOINT/BURSA W/O US: CPT

## 2023-08-11 PROCEDURE — 2500000003 HC RX 250 WO HCPCS: Performed by: ORTHOPAEDIC SURGERY

## 2023-08-11 PROCEDURE — 6360000002 HC RX W HCPCS: Performed by: ORTHOPAEDIC SURGERY

## 2023-08-11 PROCEDURE — 77002 NEEDLE LOCALIZATION BY XRAY: CPT

## 2023-08-11 RX ORDER — METHYLPREDNISOLONE ACETATE 80 MG/ML
80 INJECTION, SUSPENSION INTRA-ARTICULAR; INTRALESIONAL; INTRAMUSCULAR; SOFT TISSUE ONCE
Status: COMPLETED | OUTPATIENT
Start: 2023-08-11 | End: 2023-08-11

## 2023-08-11 RX ORDER — BUPIVACAINE HYDROCHLORIDE 5 MG/ML
4 INJECTION, SOLUTION EPIDURAL; INTRACAUDAL ONCE
Status: COMPLETED | OUTPATIENT
Start: 2023-08-11 | End: 2023-08-11

## 2023-08-11 RX ORDER — LIDOCAINE HYDROCHLORIDE 10 MG/ML
4 INJECTION, SOLUTION EPIDURAL; INFILTRATION; INTRACAUDAL; PERINEURAL ONCE
Status: COMPLETED | OUTPATIENT
Start: 2023-08-11 | End: 2023-08-11

## 2023-08-11 RX ADMIN — LIDOCAINE HYDROCHLORIDE 4 ML: 10 INJECTION, SOLUTION EPIDURAL; INFILTRATION; INTRACAUDAL; PERINEURAL at 08:39

## 2023-08-11 RX ADMIN — BUPIVACAINE HYDROCHLORIDE 20 MG: 5 INJECTION, SOLUTION EPIDURAL; INTRACAUDAL; PERINEURAL at 08:39

## 2023-08-11 RX ADMIN — METHYLPREDNISOLONE ACETATE 80 MG: 80 INJECTION, SUSPENSION INTRA-ARTICULAR; INTRALESIONAL; INTRAMUSCULAR; SOFT TISSUE at 08:39

## 2023-08-11 RX ADMIN — IOPAMIDOL 1 ML: 755 INJECTION, SOLUTION INTRAVENOUS at 08:40

## 2023-08-11 NOTE — PROGRESS NOTES
Patient tolerated right hip injection without distress. Dressing to site. Discharge instructions given, no questions at this time. Patient discharged home with .

## 2023-08-11 NOTE — BRIEF OP NOTE
Brief Postoperative Note    Jeannie Marr  YOB: 1940  [id number]    Pre-operative Diagnosis:right hip pain    Post-operative Diagnosis: Same    Procedure: Fluoro guided therapeutic injection    Anesthesia: Local    Surgeons/Assistants: Kem    Estimated Blood Loss: less than 50     Complications: None    Specimens: Was Not Obtained     Electronically signed by Mando Nieto MD on 8/11/2023 at 11:00 AM

## 2023-08-21 ENCOUNTER — TELEPHONE (OUTPATIENT)
Age: 83
End: 2023-08-21

## 2023-08-21 ENCOUNTER — TELEPHONE (OUTPATIENT)
Dept: PRIMARY CARE CLINIC | Age: 83
End: 2023-08-21

## 2023-08-21 RX ORDER — FLUCONAZOLE 100 MG/1
100 TABLET ORAL DAILY
Qty: 7 TABLET | Refills: 0 | Status: SHIPPED | OUTPATIENT
Start: 2023-08-21 | End: 2023-08-28

## 2023-08-21 NOTE — TELEPHONE ENCOUNTER
Pt was prescribe diflucan for a yeast infection from her urologist today. She stated that she also has an infection in her mouth and her urologist wont prescribe it for her mouth was told to follow up with PCP. She has dry mouth ,sore tongue, blisters in mouth. She stated that there is no white on her tongue. Was asking if she could have nystatin for her mouth sent in to [facility] pharmacy.     Please advise

## 2023-08-21 NOTE — TELEPHONE ENCOUNTER
The diflucan should help with both mouth infection and vaginal yeast infection. However this does not sound like thrush as that is usually white patches in mouth.  Patient should come in for evaluation of mouth sores if not improving with diflucan from urologist.

## 2023-08-21 NOTE — TELEPHONE ENCOUNTER
PT CALLED TO SAY THAT SHE HAS A VERY BAD YEAST INFECTION THAT IS AFFECTING BOTH HER BOWELS AND HER URINE- SHE SAYS THAT IT FEELS LIKE SHE IS ON FIRE AND SHE WONDERS IF SHE SHOULD SEE YOU FOR THIS OR CALL HER PCP-PLEASE ADVISE-ADEN

## 2023-08-28 ENCOUNTER — HOSPITAL ENCOUNTER (OUTPATIENT)
Age: 83
End: 2023-08-28

## 2023-09-08 ENCOUNTER — OFFICE VISIT (OUTPATIENT)
Dept: ORTHOPEDIC SURGERY | Age: 83
End: 2023-09-08
Payer: MEDICARE

## 2023-09-08 VITALS — RESPIRATION RATE: 14 BRPM | BODY MASS INDEX: 32.58 KG/M2 | HEIGHT: 57 IN | WEIGHT: 151 LBS

## 2023-09-08 DIAGNOSIS — M25.551 PAIN OF RIGHT HIP: Primary | ICD-10-CM

## 2023-09-08 PROCEDURE — 1090F PRES/ABSN URINE INCON ASSESS: CPT | Performed by: ORTHOPAEDIC SURGERY

## 2023-09-08 PROCEDURE — G8427 DOCREV CUR MEDS BY ELIG CLIN: HCPCS | Performed by: ORTHOPAEDIC SURGERY

## 2023-09-08 PROCEDURE — 1036F TOBACCO NON-USER: CPT | Performed by: ORTHOPAEDIC SURGERY

## 2023-09-08 PROCEDURE — 99213 OFFICE O/P EST LOW 20 MIN: CPT | Performed by: ORTHOPAEDIC SURGERY

## 2023-09-08 PROCEDURE — G8417 CALC BMI ABV UP PARAM F/U: HCPCS | Performed by: ORTHOPAEDIC SURGERY

## 2023-09-08 PROCEDURE — 1123F ACP DISCUSS/DSCN MKR DOCD: CPT | Performed by: ORTHOPAEDIC SURGERY

## 2023-09-08 PROCEDURE — G8400 PT W/DXA NO RESULTS DOC: HCPCS | Performed by: ORTHOPAEDIC SURGERY

## 2023-09-08 RX ORDER — TRAMADOL HYDROCHLORIDE 50 MG/1
50 TABLET ORAL EVERY 6 HOURS PRN
Qty: 28 TABLET | Refills: 0 | Status: SHIPPED | OUTPATIENT
Start: 2023-09-08 | End: 2023-09-15

## 2023-09-08 NOTE — PROGRESS NOTES
Sukhdev Valdez M.D.            [address] [facility], [address] [facility], [address] [place] [address] [address]           Dept Phone: [phone]           Dept Fax:  [address]          Dept Phone: [phone]           Dept Fax:  [phone]      Chief Compliant:  Chief Complaint   Patient presents with    Hip Pain     right        History of Present Illness: This is a 81 y.o. female who presents to the clinic today for evaluation / follow up of hip and leg pain on the right. Patient has a history of percutaneous pinning of the right hip with secondary posttraumatic arthritic changes. She has had IR injections in the past which have been successful. The most recent one this past July did not help the patient however describing pain not only in her thigh area now but is also going down her lower leg and into below her knee as well. She is also complaining of back pain she does have a history of a spinal cord stimulator which has been subsequently removed. She does have an MRI pending and she is supposedly have an appoint with Dr. Abdi Espinal. Review of Systems   Constitutional: Negative for fever, chills, sweats. Eyes: Negative for changes in vision, or pain. HENT: Negative for ear ache, epistaxis, or sore throat. Respiratory/Cardio: Negative for Chest pain, palpitations, SOB, or cough. Gastrointestinal: Negative for abdominal pain, N/V/D. Genitourinary: Negative for dysuria, frequency, urgency, or hematuria. Neurological: Negative for headache, numbness, or weakness. Integumentary: Negative for rash, itching, laceration, or abrasion. Musculoskeletal: Positive for Hip Pain (right)       Physical Exam:  Constitutional: Patient is oriented to person, place, and time. Patient appears well-developed and well nourished.    HENT: Negative otherwise

## 2023-09-18 ENCOUNTER — TELEPHONE (OUTPATIENT)
Dept: ONCOLOGY | Age: 83
End: 2023-09-18

## 2023-09-18 ENCOUNTER — HOSPITAL ENCOUNTER (OUTPATIENT)
Age: 83
Discharge: HOME | End: 2023-09-18
Payer: MEDICARE

## 2023-09-18 ENCOUNTER — OFFICE VISIT (OUTPATIENT)
Dept: ONCOLOGY | Age: 83
End: 2023-09-18
Payer: MEDICARE

## 2023-09-18 VITALS
WEIGHT: 151.8 LBS | HEART RATE: 65 BPM | TEMPERATURE: 97.6 F | DIASTOLIC BLOOD PRESSURE: 56 MMHG | BODY MASS INDEX: 32.85 KG/M2 | SYSTOLIC BLOOD PRESSURE: 136 MMHG

## 2023-09-18 DIAGNOSIS — D69.6 THROMBOCYTOPENIA (HCC): ICD-10-CM

## 2023-09-18 DIAGNOSIS — D69.6 THROMBOCYTOPENIA (HCC): Primary | ICD-10-CM

## 2023-09-18 LAB
BASOPHILS # BLD: 0 K/UL (ref 0–0.2)
BASOPHILS NFR BLD: 0 % (ref 0–2)
EOSINOPHIL # BLD: 0.09 K/UL (ref 0–0.4)
EOSINOPHILS RELATIVE PERCENT: 2 % (ref 1–4)
ERYTHROCYTE [DISTWIDTH] IN BLOOD BY AUTOMATED COUNT: 15.3 % (ref 12.5–15.4)
HCT VFR BLD AUTO: 37.5 % (ref 36–46)
HGB BLD-MCNC: 12.5 G/DL (ref 12–16)
LYMPHOCYTES NFR BLD: 1.19 K/UL (ref 1–4.8)
LYMPHOCYTES RELATIVE PERCENT: 27 % (ref 24–44)
MCH RBC QN AUTO: 30.1 PG (ref 26–34)
MCHC RBC AUTO-ENTMCNC: 33.3 G/DL (ref 31–37)
MCV RBC AUTO: 90.2 FL (ref 80–100)
MONOCYTES NFR BLD: 0.35 K/UL (ref 0.1–0.8)
MONOCYTES NFR BLD: 8 % (ref 1–7)
MORPHOLOGY: ABNORMAL
MORPHOLOGY: ABNORMAL
NEUTROPHILS NFR BLD: 63 % (ref 36–66)
NEUTS SEG NFR BLD: 2.77 K/UL (ref 1.8–7.7)
PLATELET # BLD AUTO: 117 K/UL (ref 140–450)
PMV BLD AUTO: 12.4 FL (ref 6–12)
RBC # BLD AUTO: 4.15 M/UL (ref 4–5.2)
WBC OTHER # BLD: 4.4 K/UL (ref 3.5–11)

## 2023-09-18 PROCEDURE — G8400 PT W/DXA NO RESULTS DOC: HCPCS | Performed by: INTERNAL MEDICINE

## 2023-09-18 PROCEDURE — 3075F SYST BP GE 130 - 139MM HG: CPT | Performed by: INTERNAL MEDICINE

## 2023-09-18 PROCEDURE — 1036F TOBACCO NON-USER: CPT | Performed by: INTERNAL MEDICINE

## 2023-09-18 PROCEDURE — 99211 OFF/OP EST MAY X REQ PHY/QHP: CPT | Performed by: INTERNAL MEDICINE

## 2023-09-18 PROCEDURE — 1123F ACP DISCUSS/DSCN MKR DOCD: CPT | Performed by: INTERNAL MEDICINE

## 2023-09-18 PROCEDURE — 3078F DIAST BP <80 MM HG: CPT | Performed by: INTERNAL MEDICINE

## 2023-09-18 PROCEDURE — 36415 COLL VENOUS BLD VENIPUNCTURE: CPT

## 2023-09-18 PROCEDURE — 99214 OFFICE O/P EST MOD 30 MIN: CPT | Performed by: INTERNAL MEDICINE

## 2023-09-18 PROCEDURE — 1090F PRES/ABSN URINE INCON ASSESS: CPT | Performed by: INTERNAL MEDICINE

## 2023-09-18 PROCEDURE — 85025 COMPLETE CBC W/AUTO DIFF WBC: CPT

## 2023-09-18 PROCEDURE — G8417 CALC BMI ABV UP PARAM F/U: HCPCS | Performed by: INTERNAL MEDICINE

## 2023-09-18 PROCEDURE — G8427 DOCREV CUR MEDS BY ELIG CLIN: HCPCS | Performed by: INTERNAL MEDICINE

## 2023-09-18 RX ORDER — GLIPIZIDE 5 MG/1
TABLET ORAL
COMMUNITY
Start: 2023-08-09

## 2023-09-18 NOTE — PROGRESS NOTES
_           Chief Complaint   Patient presents with    Follow-up     Review status of disease    Discuss Labs    Fatigue    Shortness of Breath     DIAGNOSIS:         Chronic thrombocytopenia. 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 CURRENT THERAPY:         Observation for the above. BRIEF CASE HISTORY:      Ms. Yonathan Stratton is a very pleasant 81 y.o. female with history of multiple co morbidities as listed. Patient is referred for evaluation of thrombocytopenia. Patient is aware of this hematologic problem for so many years. Patient states that she had problems with thrombocytopenia before moving to [place] in 2007. She never needed any treatment. She was always told to have low platelet counts before surgeries. Never had any complications. Currently stable. No nosebleed or gum bleed. She has chronic easy bruising. No GI bleeding. No hematuria. No vaginal bleeding. No fever or night sweats. No enlarged lymph nodes. No weight loss or decreased appetite. No other complaints. Patient denies smoking or alcohol drinking. .   INTERIM HISTORY:   Seen for follow up thrombocytopenia. Clinically stable. She had recent cath and started on ASA and plavix. No bruises. No active bleeding. No fever.     PAST MEDICAL HISTORY: has a past medical history of Age-related cognitive decline, Ankle pain, Anxiety, B12 deficiency, Winters esophagus, 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HCC), Caffeine use, Chronic back pain, Chronic ITP (idiopathic thrombocytopenia) (HCC), CVA (cerebral infarction), Diabetic gastroparesis ([address]), Diverticular disease, GERD (gastroesophageal reflux disease), Hiatal hernia, Hip fracture ([address]), History of blood transfusion, History of decreased platelet count, Hyperlipemia, Hypertension, Internal hemorrhoid, Macular degeneration of left eye, Nausea and vomiting, Neuropathy, Osteoarthritis, Ringing in ears, Self-catheterizes urinary

## 2023-09-18 NOTE — TELEPHONE ENCOUNTER
AVS from 9/18/23      CBC q 6 months  RV 6 months      Rv sched for 3/18 @ 9:30     Pt was given AVS and appointment schedule    Electronically signed by Clemencia Del Real on 9/18/2023 at 10:40 AM

## 2023-09-26 ENCOUNTER — OFFICE VISIT (OUTPATIENT)
Dept: ORTHOPEDIC SURGERY | Age: 83
End: 2023-09-26
Payer: MEDICARE

## 2023-09-26 VITALS — HEIGHT: 57 IN | BODY MASS INDEX: 32.79 KG/M2 | RESPIRATION RATE: 14 BRPM | WEIGHT: 152 LBS

## 2023-09-26 DIAGNOSIS — M43.10 ACQUIRED SPONDYLOLISTHESIS: ICD-10-CM

## 2023-09-26 DIAGNOSIS — M54.50 LOW BACK PAIN, UNSPECIFIED BACK PAIN LATERALITY, UNSPECIFIED CHRONICITY, UNSPECIFIED WHETHER SCIATICA PRESENT: ICD-10-CM

## 2023-09-26 DIAGNOSIS — M25.551 RIGHT HIP PAIN: ICD-10-CM

## 2023-09-26 DIAGNOSIS — M16.11 ARTHRITIS OF RIGHT HIP: Primary | ICD-10-CM

## 2023-09-26 DIAGNOSIS — M96.1 POST LAMINECTOMY SYNDROME: ICD-10-CM

## 2023-09-26 PROCEDURE — G8400 PT W/DXA NO RESULTS DOC: HCPCS | Performed by: ORTHOPAEDIC SURGERY

## 2023-09-26 PROCEDURE — 99213 OFFICE O/P EST LOW 20 MIN: CPT | Performed by: ORTHOPAEDIC SURGERY

## 2023-09-26 PROCEDURE — G8417 CALC BMI ABV UP PARAM F/U: HCPCS | Performed by: ORTHOPAEDIC SURGERY

## 2023-09-26 PROCEDURE — 1090F PRES/ABSN URINE INCON ASSESS: CPT | Performed by: ORTHOPAEDIC SURGERY

## 2023-09-26 PROCEDURE — G8427 DOCREV CUR MEDS BY ELIG CLIN: HCPCS | Performed by: ORTHOPAEDIC SURGERY

## 2023-09-26 PROCEDURE — 1123F ACP DISCUSS/DSCN MKR DOCD: CPT | Performed by: ORTHOPAEDIC SURGERY

## 2023-09-26 PROCEDURE — 1036F TOBACCO NON-USER: CPT | Performed by: ORTHOPAEDIC SURGERY

## 2023-09-26 NOTE — PROGRESS NOTES
Patient ID: Michael Mcclelland is a 81 y.o. female    Chief Compliant:  No chief complaint on file. Diagnostic imaging:    AP lateral lumbar spine status post L1-2 PLIF peripheral nerve stimulator previously placed is absent history of L4 to sacrum posterior decompression instrumented fusion with ongoing grade 2 spondylolisthesis L5-S1    Prior imaging has shown increased uptake in the facet joints at     L3-4 prior CT myelogram has shown mild to moderate L3-4 facet cyst arthritis and stenosis    X-rays hips bilateral March 2023 shows right hip status post percutaneous pinning of the femoral neck fracture patient with significant subchondral sclerosis marginal osteophytes significant joint space narrowing but not bone-on-bone arthritis is more central and less in the dome nevertheless joint spaces only 1 to 2 mm    Assessment and Plan:  1. Arthritis of right hip    2. Right hip pain    3. Low back pain, unspecified back pain laterality, unspecified chronicity, unspecified whether sciatica present    4. Post laminectomy syndrome    5. Acquired spondylolisthesis      Right hip pain. Aggravated with range of motion patient with significant limitations secondary to diminished range of motion as far as taking care of activities of daily living     We discussed risks of surgery and the recovery process. Risks include infection, neurovascular injury, systemic and anesthetic complication, leg length discrepancy, and hip dislocation. Follow up post op    HPI:  This is a 81 y.o. female who presents to the clinic today for low back pain with bi-radicular hip pain with right worse than the left. She had a LESI which provided moderate relief, JUAN RAMON with minimal relief. She had a right stent placement of right coronary artery and follows with Dr. Carolina Chapin, cardiology. She reports that she had her nerve stimulator removed with wires removed by Dr. Jaskaran Sandoval.      At this time she has right hip pain with radiation to the right

## 2023-09-28 ENCOUNTER — TELEPHONE (OUTPATIENT)
Dept: PRIMARY CARE CLINIC | Age: 83
End: 2023-09-28

## 2023-10-23 ENCOUNTER — HOSPITAL ENCOUNTER (OUTPATIENT)
Dept: PREADMISSION TESTING | Age: 83
Discharge: HOME | End: 2023-10-27
Payer: MEDICARE

## 2023-10-23 VITALS
BODY MASS INDEX: 32.28 KG/M2 | HEIGHT: 57 IN | RESPIRATION RATE: 18 BRPM | SYSTOLIC BLOOD PRESSURE: 128 MMHG | OXYGEN SATURATION: 98 % | TEMPERATURE: 98.2 F | WEIGHT: 149.6 LBS | DIASTOLIC BLOOD PRESSURE: 80 MMHG | HEART RATE: 69 BPM

## 2023-10-23 LAB
ABO + RH BLD: NORMAL
ANION GAP SERPL CALCULATED.3IONS-SCNC: 13 MMOL/L (ref 9–17)
ARM BAND NUMBER: NORMAL
BACTERIA URNS QL MICRO: ABNORMAL
BASOPHILS # BLD: 0.05 K/UL (ref 0–0.2)
BASOPHILS NFR BLD: 1 % (ref 0–2)
BILIRUB UR QL STRIP: ABNORMAL
BLOOD BANK SAMPLE EXPIRATION: NORMAL
BLOOD GROUP ANTIBODIES SERPL: NEGATIVE
BUN SERPL-MCNC: 24 MG/DL (ref 8–23)
CALCIUM SERPL-MCNC: 9.4 MG/DL (ref 8.6–10.4)
CASTS #/AREA URNS LPF: ABNORMAL /LPF
CHLORIDE SERPL-SCNC: 103 MMOL/L (ref 98–107)
CLARITY UR: CLEAR
CO2 SERPL-SCNC: 24 MMOL/L (ref 20–31)
COLOR UR: ABNORMAL
CREAT SERPL-MCNC: 1 MG/DL (ref 0.5–0.9)
EOSINOPHIL # BLD: 0.16 K/UL (ref 0–0.4)
EOSINOPHILS RELATIVE PERCENT: 3 % (ref 0–4)
EPI CELLS #/AREA URNS HPF: ABNORMAL /HPF
ERYTHROCYTE [DISTWIDTH] IN BLOOD BY AUTOMATED COUNT: 14.7 % (ref 11.5–14.9)
GFR SERPL CREATININE-BSD FRML MDRD: 56 ML/MIN/1.73M2
GLUCOSE SERPL-MCNC: 175 MG/DL (ref 70–99)
GLUCOSE UR STRIP-MCNC: ABNORMAL MG/DL
HCT VFR BLD AUTO: 36.6 % (ref 36–46)
HGB BLD-MCNC: 12 G/DL (ref 12–16)
HGB UR QL STRIP.AUTO: NEGATIVE
KETONES UR STRIP-MCNC: ABNORMAL MG/DL
LEUKOCYTE ESTERASE UR QL STRIP: ABNORMAL
LYMPHOCYTES NFR BLD: 1.14 K/UL (ref 1–4.8)
LYMPHOCYTES RELATIVE PERCENT: 22 % (ref 24–44)
MCH RBC QN AUTO: 30 PG (ref 26–34)
MCHC RBC AUTO-ENTMCNC: 32.9 G/DL (ref 31–37)
MCV RBC AUTO: 91.3 FL (ref 80–100)
MONOCYTES NFR BLD: 0.42 K/UL (ref 0.1–1.3)
MONOCYTES NFR BLD: 8 % (ref 1–7)
MORPHOLOGY: NORMAL
NEUTROPHILS NFR BLD: 66 % (ref 36–66)
NEUTS SEG NFR BLD: 3.43 K/UL (ref 1.3–9.1)
NITRITE UR QL STRIP: NEGATIVE
PH UR STRIP: 5 [PH] (ref 5–8)
PLATELET # BLD AUTO: 130 K/UL (ref 150–450)
PMV BLD AUTO: 12.4 FL (ref 6–12)
POTASSIUM SERPL-SCNC: 4.2 MMOL/L (ref 3.7–5.3)
PROT UR STRIP-MCNC: NEGATIVE MG/DL
RBC # BLD AUTO: 4.01 M/UL (ref 4–5.2)
RBC #/AREA URNS HPF: ABNORMAL /HPF
SODIUM SERPL-SCNC: 140 MMOL/L (ref 135–144)
SP GR UR STRIP: 1.02 (ref 1–1.03)
UROBILINOGEN UR STRIP-ACNC: NORMAL EU/DL (ref 0–1)
WBC #/AREA URNS HPF: ABNORMAL /HPF
WBC OTHER # BLD: 5.2 K/UL (ref 3.5–11)

## 2023-10-23 PROCEDURE — 85025 COMPLETE CBC W/AUTO DIFF WBC: CPT

## 2023-10-23 PROCEDURE — 80048 BASIC METABOLIC PNL TOTAL CA: CPT

## 2023-10-23 PROCEDURE — 86850 RBC ANTIBODY SCREEN: CPT

## 2023-10-23 PROCEDURE — 87641 MR-STAPH DNA AMP PROBE: CPT

## 2023-10-23 PROCEDURE — 86901 BLOOD TYPING SEROLOGIC RH(D): CPT

## 2023-10-23 PROCEDURE — 86900 BLOOD TYPING SEROLOGIC ABO: CPT

## 2023-10-23 PROCEDURE — 81001 URINALYSIS AUTO W/SCOPE: CPT

## 2023-10-23 PROCEDURE — 36415 COLL VENOUS BLD VENIPUNCTURE: CPT

## 2023-10-23 ASSESSMENT — PAIN SCALES - GENERAL: PAINLEVEL_OUTOF10: 8

## 2023-10-23 ASSESSMENT — PAIN DESCRIPTION - DESCRIPTORS: DESCRIPTORS: ACHING

## 2023-10-23 ASSESSMENT — PAIN DESCRIPTION - LOCATION: LOCATION: BACK;HIP

## 2023-10-23 ASSESSMENT — PAIN DESCRIPTION - ORIENTATION: ORIENTATION: RIGHT

## 2023-10-23 ASSESSMENT — PAIN DESCRIPTION - PAIN TYPE: TYPE: ACUTE PAIN

## 2023-10-23 NOTE — DISCHARGE INSTRUCTIONS
bring a copy. You will be taken to the pre-op holding area where you will be prepared for surgery. A physical assessment will be performed by a nurse practitioner or house officer. Your IV will be started and you will meet your anesthesiologist.    When you go to surgery, your family will be directed to the surgical waiting room, where the doctor should speak with them after your surgery. After surgery, you will be taken to the recovery area. When you are alert and stable, you will receive instructions and be prepared for discharge. If you use a Bi-PAP or C-PAP machine, please bring it with you and leave it in the car in case it is needed in recovery room.

## 2023-10-23 NOTE — H&P (VIEW-ONLY)
MU - CNP on 10/23/2023 at 2:02 PM    Total time spent on encounter- PAT provider minutes: 21-30 minutes    Note marked for cosign by provider

## 2023-10-23 NOTE — PROGRESS NOTES
Patient is difficult stick, lab was paged to draw blood. Patient collected urine specimen via straight cath.

## 2023-10-23 NOTE — H&P
Exercise per Week: 0 days     Minutes of Exercise per Session: 0 min   Housing Stability: Unknown (2/9/2023)    Housing Stability Vital Sign     Unstable Housing in the Last Year: No           REVIEW OF SYSTEMS      Allergies   Allergen Reactions    Iodides Anaphylaxis, Hives and Itching     \"Contrast dyes\"  This was added by someone but Patient states has had IVP dyes multiple times without problems and had a myelogram in Dec 2016 without problems    Povidone-Iodine Anaphylaxis, Hives and Swelling    Sulfa Antibiotics Hives and Swelling     Other reaction(s): Unknown  Other reaction(s): Intolerance-unknown  Hands, feet, mouth, eyes  Other reaction(s): Unknown    Betadine [Povidone Iodine] Hives    Cephalexin Hives and Swelling    Cephalexin Itching     Other reaction(s): Hallucinations  In 1969 received demerol and keflex together so was told to list both as allergies    Cozaar [Losartan] Nausea Only     Pt also gets sores on toungue    Cymbalta [Duloxetine Hcl] Diarrhea and Nausea And Vomiting     Weakness    Demerol Hives and Swelling    Doxycycline Other (See Comments)     Sore mouth        Meperidine Itching     Other reaction(s): Hallucinations      Morphine Hives and Itching    Penicillins Hives, Swelling and Itching     Other reaction(s): Intolerance-unknown  Other reaction(s): Unknown    Zithromax [Azithromycin] Other (See Comments)     Sore mouth      Ciprofloxacin      Pt stated it gave her canker sores    Clindamycin/Lincomycin     Etodolac     Fluorescein     Iodine      Other reaction(s):  Intolerance-unknown    Lisinopril      cough    Penicillin G     Toviaz [Fesoterodine Fumarate Er]     Clonazepam Other (See Comments)     From neuro: made her too sleepy    Metformin And Related Nausea And Vomiting     Diarrhea and N/V       Current Outpatient Medications on File Prior to Encounter   Medication Sig Dispense Refill    glipiZIDE (GLUCOTROL) 5 MG tablet       primidone (MYSOLINE) 50 MG tablet TAKE 1

## 2023-10-24 LAB
MRSA, DNA, NASAL: NEGATIVE
SPECIMEN DESCRIPTION: NORMAL

## 2023-10-26 RX ORDER — PRIMIDONE 50 MG/1
50 TABLET ORAL 2 TIMES DAILY
Qty: 180 TABLET | Refills: 2 | Status: SHIPPED | OUTPATIENT
Start: 2023-10-26 | End: 2024-07-22

## 2023-10-26 RX ORDER — PITAVASTATIN CALCIUM 2.09 MG/1
1 TABLET, FILM COATED ORAL EVERY EVENING
Qty: 90 TABLET | Refills: 3 | Status: SHIPPED | OUTPATIENT
Start: 2023-10-26 | End: 2024-10-20

## 2023-11-03 ENCOUNTER — ANESTHESIA EVENT (OUTPATIENT)
Dept: OPERATING ROOM | Age: 83
DRG: 470 | End: 2023-11-03
Payer: MEDICARE

## 2023-11-03 NOTE — PRE-PROCEDURE INSTRUCTIONS
No answer, left message ? Unable to leave message ? When were you told to arrive at hospital ?  -0915    Do you have a  ?-YES    Are you on any blood thinners ? -ASA, PLAVIX                    If yes when did you stop taking ?- 1 WEEK AGO    Do you have your prep Rx filled and instruction ?  -N/A    Nothing to eat the day before , only clear liquids. -N/A    Are you experiencing any covid symptoms ? -NONE    Do you have any infections or rash we should be aware of ?-NONE      Do you have the Hibiclens soap to use the night before and the morning of surgery ? -YES    Nothing to eat or drink after midnight, only a sip of water to take any medication instructed to take the night before. -INSTRUCTED    Wear comfortable clothing, leave any valuables at home, remove any jewelry and body piercing . -INSTRUCTED

## 2023-11-06 ENCOUNTER — HOSPITAL ENCOUNTER (INPATIENT)
Age: 83
LOS: 1 days | Discharge: NURSING, REHAB OR LONG-TERM CARE | DRG: 470 | End: 2023-11-07
Attending: ORTHOPAEDIC SURGERY | Admitting: ORTHOPAEDIC SURGERY
Payer: MEDICARE

## 2023-11-06 ENCOUNTER — APPOINTMENT (OUTPATIENT)
Dept: GENERAL RADIOLOGY | Age: 83
DRG: 470 | End: 2023-11-06
Attending: ORTHOPAEDIC SURGERY
Payer: MEDICARE

## 2023-11-06 ENCOUNTER — ANESTHESIA (OUTPATIENT)
Dept: OPERATING ROOM | Age: 83
DRG: 470 | End: 2023-11-06
Payer: MEDICARE

## 2023-11-06 DIAGNOSIS — M16.11 ARTHRITIS OF RIGHT HIP: Primary | ICD-10-CM

## 2023-11-06 LAB
GLUCOSE BLD-MCNC: 141 MG/DL (ref 65–105)
GLUCOSE BLD-MCNC: 156 MG/DL (ref 65–105)
GLUCOSE BLD-MCNC: 372 MG/DL (ref 65–105)

## 2023-11-06 PROCEDURE — 82947 ASSAY GLUCOSE BLOOD QUANT: CPT

## 2023-11-06 PROCEDURE — 6360000002 HC RX W HCPCS: Performed by: ORTHOPAEDIC SURGERY

## 2023-11-06 PROCEDURE — 7100000001 HC PACU RECOVERY - ADDTL 15 MIN: Performed by: ORTHOPAEDIC SURGERY

## 2023-11-06 PROCEDURE — 2580000003 HC RX 258: Performed by: ANESTHESIOLOGY

## 2023-11-06 PROCEDURE — 2500000003 HC RX 250 WO HCPCS: Performed by: ORTHOPAEDIC SURGERY

## 2023-11-06 PROCEDURE — 6370000000 HC RX 637 (ALT 250 FOR IP): Performed by: ANESTHESIOLOGY

## 2023-11-06 PROCEDURE — C1776 JOINT DEVICE (IMPLANTABLE): HCPCS | Performed by: ORTHOPAEDIC SURGERY

## 2023-11-06 PROCEDURE — 7100000000 HC PACU RECOVERY - FIRST 15 MIN: Performed by: ORTHOPAEDIC SURGERY

## 2023-11-06 PROCEDURE — 73501 X-RAY EXAM HIP UNI 1 VIEW: CPT

## 2023-11-06 PROCEDURE — 97535 SELF CARE MNGMENT TRAINING: CPT

## 2023-11-06 PROCEDURE — 3700000001 HC ADD 15 MINUTES (ANESTHESIA): Performed by: ORTHOPAEDIC SURGERY

## 2023-11-06 PROCEDURE — 0QP805Z REMOVAL OF EXTERNAL FIXATION DEVICE FROM RIGHT FEMORAL SHAFT, OPEN APPROACH: ICD-10-PCS | Performed by: ORTHOPAEDIC SURGERY

## 2023-11-06 PROCEDURE — 97116 GAIT TRAINING THERAPY: CPT

## 2023-11-06 PROCEDURE — 97530 THERAPEUTIC ACTIVITIES: CPT

## 2023-11-06 PROCEDURE — 3600000013 HC SURGERY LEVEL 3 ADDTL 15MIN: Performed by: ORTHOPAEDIC SURGERY

## 2023-11-06 PROCEDURE — 2709999900 HC NON-CHARGEABLE SUPPLY: Performed by: ORTHOPAEDIC SURGERY

## 2023-11-06 PROCEDURE — 3600000003 HC SURGERY LEVEL 3 BASE: Performed by: ORTHOPAEDIC SURGERY

## 2023-11-06 PROCEDURE — 0SR90JZ REPLACEMENT OF RIGHT HIP JOINT WITH SYNTHETIC SUBSTITUTE, OPEN APPROACH: ICD-10-PCS | Performed by: ORTHOPAEDIC SURGERY

## 2023-11-06 PROCEDURE — 6370000000 HC RX 637 (ALT 250 FOR IP): Performed by: ORTHOPAEDIC SURGERY

## 2023-11-06 PROCEDURE — 97110 THERAPEUTIC EXERCISES: CPT

## 2023-11-06 PROCEDURE — 6360000002 HC RX W HCPCS: Performed by: ANESTHESIOLOGY

## 2023-11-06 PROCEDURE — 3700000000 HC ANESTHESIA ATTENDED CARE: Performed by: ORTHOPAEDIC SURGERY

## 2023-11-06 PROCEDURE — 97166 OT EVAL MOD COMPLEX 45 MIN: CPT

## 2023-11-06 PROCEDURE — 2580000003 HC RX 258: Performed by: ORTHOPAEDIC SURGERY

## 2023-11-06 PROCEDURE — 97162 PT EVAL MOD COMPLEX 30 MIN: CPT

## 2023-11-06 PROCEDURE — 1200000000 HC SEMI PRIVATE

## 2023-11-06 PROCEDURE — 2500000003 HC RX 250 WO HCPCS: Performed by: ANESTHESIOLOGY

## 2023-11-06 PROCEDURE — 6370000000 HC RX 637 (ALT 250 FOR IP): Performed by: INTERNAL MEDICINE

## 2023-11-06 PROCEDURE — 99222 1ST HOSP IP/OBS MODERATE 55: CPT | Performed by: INTERNAL MEDICINE

## 2023-11-06 DEVICE — HEAD FEM DIA36MM -3MM OFFSET HIP CO CHROM TYP 1 TAPR MOD G7: Type: IMPLANTABLE DEVICE | Site: HIP | Status: FUNCTIONAL

## 2023-11-06 DEVICE — SHELL ACET SZ E DIA52MM 3 H OSSEOTI LIMIT H 2 MOBILITY G7: Type: IMPLANTABLE DEVICE | Site: HIP | Status: FUNCTIONAL

## 2023-11-06 DEVICE — IMPLANTABLE DEVICE: Type: IMPLANTABLE DEVICE | Site: HIP | Status: FUNCTIONAL

## 2023-11-06 DEVICE — STEM FEM SZ 14 L148MM STD OFFSET DST HIP TI PPS TYP 1 TAPR: Type: IMPLANTABLE DEVICE | Site: HIP | Status: FUNCTIONAL

## 2023-11-06 RX ORDER — SODIUM CHLORIDE 0.9 % (FLUSH) 0.9 %
5-40 SYRINGE (ML) INJECTION PRN
Status: DISCONTINUED | OUTPATIENT
Start: 2023-11-06 | End: 2023-11-07 | Stop reason: HOSPADM

## 2023-11-06 RX ORDER — PRIMIDONE 50 MG/1
50 TABLET ORAL 2 TIMES DAILY
Status: DISCONTINUED | OUTPATIENT
Start: 2023-11-06 | End: 2023-11-07 | Stop reason: HOSPADM

## 2023-11-06 RX ORDER — FENTANYL CITRATE 50 UG/ML
INJECTION, SOLUTION INTRAMUSCULAR; INTRAVENOUS PRN
Status: DISCONTINUED | OUTPATIENT
Start: 2023-11-06 | End: 2023-11-06 | Stop reason: SDUPTHER

## 2023-11-06 RX ORDER — ACETAMINOPHEN 500 MG
1000 TABLET ORAL ONCE
Status: COMPLETED | OUTPATIENT
Start: 2023-11-06 | End: 2023-11-06

## 2023-11-06 RX ORDER — SODIUM CHLORIDE 0.9 % (FLUSH) 0.9 %
5-40 SYRINGE (ML) INJECTION PRN
Status: DISCONTINUED | OUTPATIENT
Start: 2023-11-06 | End: 2023-11-06 | Stop reason: HOSPADM

## 2023-11-06 RX ORDER — DEXAMETHASONE SODIUM PHOSPHATE 4 MG/ML
INJECTION, SOLUTION INTRA-ARTICULAR; INTRALESIONAL; INTRAMUSCULAR; INTRAVENOUS; SOFT TISSUE PRN
Status: DISCONTINUED | OUTPATIENT
Start: 2023-11-06 | End: 2023-11-06 | Stop reason: SDUPTHER

## 2023-11-06 RX ORDER — LIDOCAINE HYDROCHLORIDE 10 MG/ML
INJECTION, SOLUTION EPIDURAL; INFILTRATION; INTRACAUDAL; PERINEURAL PRN
Status: DISCONTINUED | OUTPATIENT
Start: 2023-11-06 | End: 2023-11-06 | Stop reason: SDUPTHER

## 2023-11-06 RX ORDER — PROPOFOL 10 MG/ML
INJECTION, EMULSION INTRAVENOUS PRN
Status: DISCONTINUED | OUTPATIENT
Start: 2023-11-06 | End: 2023-11-06 | Stop reason: SDUPTHER

## 2023-11-06 RX ORDER — METOPROLOL SUCCINATE 25 MG/1
12.5 TABLET, EXTENDED RELEASE ORAL DAILY
Status: DISCONTINUED | OUTPATIENT
Start: 2023-11-06 | End: 2023-11-07 | Stop reason: HOSPADM

## 2023-11-06 RX ORDER — LIDOCAINE HYDROCHLORIDE 10 MG/ML
1 INJECTION, SOLUTION EPIDURAL; INFILTRATION; INTRACAUDAL; PERINEURAL
Status: DISCONTINUED | OUTPATIENT
Start: 2023-11-06 | End: 2023-11-06 | Stop reason: HOSPADM

## 2023-11-06 RX ORDER — GABAPENTIN 100 MG/1
100 CAPSULE ORAL ONCE
Status: COMPLETED | OUTPATIENT
Start: 2023-11-06 | End: 2023-11-06

## 2023-11-06 RX ORDER — SODIUM CHLORIDE 0.9 % (FLUSH) 0.9 %
5-40 SYRINGE (ML) INJECTION EVERY 12 HOURS SCHEDULED
Status: DISCONTINUED | OUTPATIENT
Start: 2023-11-06 | End: 2023-11-06 | Stop reason: HOSPADM

## 2023-11-06 RX ORDER — SODIUM CHLORIDE 9 MG/ML
INJECTION, SOLUTION INTRAVENOUS PRN
Status: DISCONTINUED | OUTPATIENT
Start: 2023-11-06 | End: 2023-11-06 | Stop reason: HOSPADM

## 2023-11-06 RX ORDER — INSULIN LISPRO 100 [IU]/ML
0-4 INJECTION, SOLUTION INTRAVENOUS; SUBCUTANEOUS NIGHTLY
Status: DISCONTINUED | OUTPATIENT
Start: 2023-11-06 | End: 2023-11-07 | Stop reason: HOSPADM

## 2023-11-06 RX ORDER — INSULIN LISPRO 100 [IU]/ML
0-8 INJECTION, SOLUTION INTRAVENOUS; SUBCUTANEOUS
Status: DISCONTINUED | OUTPATIENT
Start: 2023-11-07 | End: 2023-11-07 | Stop reason: HOSPADM

## 2023-11-06 RX ORDER — RANOLAZINE 500 MG/1
500 TABLET, EXTENDED RELEASE ORAL 2 TIMES DAILY
Status: DISCONTINUED | OUTPATIENT
Start: 2023-11-06 | End: 2023-11-07 | Stop reason: HOSPADM

## 2023-11-06 RX ORDER — AMLODIPINE BESYLATE 5 MG/1
5 TABLET ORAL DAILY
Status: DISCONTINUED | OUTPATIENT
Start: 2023-11-06 | End: 2023-11-07 | Stop reason: HOSPADM

## 2023-11-06 RX ORDER — DIPHENHYDRAMINE HYDROCHLORIDE 50 MG/ML
12.5 INJECTION INTRAMUSCULAR; INTRAVENOUS
Status: DISCONTINUED | OUTPATIENT
Start: 2023-11-06 | End: 2023-11-06 | Stop reason: HOSPADM

## 2023-11-06 RX ORDER — HYDROMORPHONE HYDROCHLORIDE 2 MG/ML
INJECTION, SOLUTION INTRAMUSCULAR; INTRAVENOUS; SUBCUTANEOUS PRN
Status: DISCONTINUED | OUTPATIENT
Start: 2023-11-06 | End: 2023-11-06 | Stop reason: SDUPTHER

## 2023-11-06 RX ORDER — BUPIVACAINE HYDROCHLORIDE AND EPINEPHRINE 2.5; 5 MG/ML; UG/ML
INJECTION, SOLUTION EPIDURAL; INFILTRATION; INTRACAUDAL; PERINEURAL PRN
Status: DISCONTINUED | OUTPATIENT
Start: 2023-11-06 | End: 2023-11-06 | Stop reason: ALTCHOICE

## 2023-11-06 RX ORDER — VANCOMYCIN HYDROCHLORIDE 1 G/20ML
INJECTION, POWDER, LYOPHILIZED, FOR SOLUTION INTRAVENOUS PRN
Status: DISCONTINUED | OUTPATIENT
Start: 2023-11-06 | End: 2023-11-06 | Stop reason: ALTCHOICE

## 2023-11-06 RX ORDER — ASPIRIN 81 MG/1
81 TABLET ORAL 2 TIMES DAILY
Status: DISCONTINUED | OUTPATIENT
Start: 2023-11-06 | End: 2023-11-07 | Stop reason: HOSPADM

## 2023-11-06 RX ORDER — ESTRADIOL 0.1 MG/G
2 CREAM VAGINAL DAILY
Status: DISCONTINUED | OUTPATIENT
Start: 2023-11-06 | End: 2023-11-07

## 2023-11-06 RX ORDER — ONDANSETRON 2 MG/ML
INJECTION INTRAMUSCULAR; INTRAVENOUS PRN
Status: DISCONTINUED | OUTPATIENT
Start: 2023-11-06 | End: 2023-11-06 | Stop reason: SDUPTHER

## 2023-11-06 RX ORDER — FENTANYL CITRATE 0.05 MG/ML
25 INJECTION, SOLUTION INTRAMUSCULAR; INTRAVENOUS EVERY 5 MIN PRN
Status: DISCONTINUED | OUTPATIENT
Start: 2023-11-06 | End: 2023-11-06 | Stop reason: HOSPADM

## 2023-11-06 RX ORDER — SODIUM CHLORIDE 9 MG/ML
INJECTION, SOLUTION INTRAVENOUS CONTINUOUS
Status: DISCONTINUED | OUTPATIENT
Start: 2023-11-06 | End: 2023-11-06 | Stop reason: HOSPADM

## 2023-11-06 RX ORDER — HYDROCODONE BITARTRATE AND ACETAMINOPHEN 5; 325 MG/1; MG/1
2 TABLET ORAL EVERY 4 HOURS PRN
Status: DISCONTINUED | OUTPATIENT
Start: 2023-11-06 | End: 2023-11-07 | Stop reason: HOSPADM

## 2023-11-06 RX ORDER — CEFAZOLIN SODIUM 1 G/3ML
INJECTION, POWDER, FOR SOLUTION INTRAMUSCULAR; INTRAVENOUS PRN
Status: DISCONTINUED | OUTPATIENT
Start: 2023-11-06 | End: 2023-11-06 | Stop reason: SDUPTHER

## 2023-11-06 RX ORDER — HYDROCODONE BITARTRATE AND ACETAMINOPHEN 5; 325 MG/1; MG/1
1 TABLET ORAL EVERY 4 HOURS PRN
Status: DISCONTINUED | OUTPATIENT
Start: 2023-11-06 | End: 2023-11-07 | Stop reason: HOSPADM

## 2023-11-06 RX ORDER — ATORVASTATIN CALCIUM 10 MG/1
10 TABLET, FILM COATED ORAL NIGHTLY
Status: DISCONTINUED | OUTPATIENT
Start: 2023-11-06 | End: 2023-11-07 | Stop reason: HOSPADM

## 2023-11-06 RX ORDER — PANTOPRAZOLE SODIUM 40 MG/1
40 TABLET, DELAYED RELEASE ORAL
Status: DISCONTINUED | OUTPATIENT
Start: 2023-11-07 | End: 2023-11-07 | Stop reason: HOSPADM

## 2023-11-06 RX ORDER — ROPINIROLE 2 MG/1
4 TABLET, FILM COATED ORAL NIGHTLY
Status: DISCONTINUED | OUTPATIENT
Start: 2023-11-06 | End: 2023-11-07 | Stop reason: HOSPADM

## 2023-11-06 RX ORDER — LANOLIN ALCOHOL/MO/W.PET/CERES
1000 CREAM (GRAM) TOPICAL WEEKLY
Status: DISCONTINUED | OUTPATIENT
Start: 2023-11-06 | End: 2023-11-07 | Stop reason: HOSPADM

## 2023-11-06 RX ORDER — DEXTROSE MONOHYDRATE 100 MG/ML
INJECTION, SOLUTION INTRAVENOUS CONTINUOUS PRN
Status: DISCONTINUED | OUTPATIENT
Start: 2023-11-06 | End: 2023-11-07 | Stop reason: HOSPADM

## 2023-11-06 RX ORDER — SODIUM CHLORIDE 9 MG/ML
INJECTION, SOLUTION INTRAVENOUS PRN
Status: DISCONTINUED | OUTPATIENT
Start: 2023-11-06 | End: 2023-11-07 | Stop reason: HOSPADM

## 2023-11-06 RX ORDER — GLIPIZIDE 5 MG/1
2.5 TABLET ORAL
Status: DISCONTINUED | OUTPATIENT
Start: 2023-11-07 | End: 2023-11-07 | Stop reason: HOSPADM

## 2023-11-06 RX ORDER — NITROFURANTOIN 25; 75 MG/1; MG/1
100 CAPSULE ORAL DAILY
Status: DISCONTINUED | OUTPATIENT
Start: 2023-11-06 | End: 2023-11-06

## 2023-11-06 RX ORDER — TRANEXAMIC ACID 100 MG/ML
INJECTION, SOLUTION INTRAVENOUS PRN
Status: DISCONTINUED | OUTPATIENT
Start: 2023-11-06 | End: 2023-11-06 | Stop reason: SDUPTHER

## 2023-11-06 RX ORDER — ROCURONIUM BROMIDE 10 MG/ML
INJECTION, SOLUTION INTRAVENOUS PRN
Status: DISCONTINUED | OUTPATIENT
Start: 2023-11-06 | End: 2023-11-06 | Stop reason: SDUPTHER

## 2023-11-06 RX ORDER — ONDANSETRON 2 MG/ML
4 INJECTION INTRAMUSCULAR; INTRAVENOUS
Status: DISCONTINUED | OUTPATIENT
Start: 2023-11-06 | End: 2023-11-06 | Stop reason: HOSPADM

## 2023-11-06 RX ORDER — SODIUM CHLORIDE 0.9 % (FLUSH) 0.9 %
5-40 SYRINGE (ML) INJECTION EVERY 12 HOURS SCHEDULED
Status: DISCONTINUED | OUTPATIENT
Start: 2023-11-06 | End: 2023-11-07 | Stop reason: HOSPADM

## 2023-11-06 RX ORDER — GLIPIZIDE 5 MG/1
5 TABLET ORAL
Status: DISCONTINUED | OUTPATIENT
Start: 2023-11-07 | End: 2023-11-07 | Stop reason: HOSPADM

## 2023-11-06 RX ADMIN — PRIMIDONE 50 MG: 50 TABLET ORAL at 20:10

## 2023-11-06 RX ADMIN — LIDOCAINE HYDROCHLORIDE 50 MG: 10 INJECTION, SOLUTION EPIDURAL; INFILTRATION; INTRACAUDAL; PERINEURAL at 11:46

## 2023-11-06 RX ADMIN — DEXAMETHASONE SODIUM PHOSPHATE 4 MG: 4 INJECTION INTRA-ARTICULAR; INTRALESIONAL; INTRAMUSCULAR; INTRAVENOUS; SOFT TISSUE at 12:36

## 2023-11-06 RX ADMIN — GABAPENTIN 100 MG: 100 CAPSULE ORAL at 10:52

## 2023-11-06 RX ADMIN — ROCURONIUM BROMIDE 50 MG: 10 INJECTION, SOLUTION INTRAVENOUS at 11:46

## 2023-11-06 RX ADMIN — RANOLAZINE 500 MG: 500 TABLET, EXTENDED RELEASE ORAL at 20:10

## 2023-11-06 RX ADMIN — SODIUM CHLORIDE, PRESERVATIVE FREE 10 ML: 5 INJECTION INTRAVENOUS at 20:10

## 2023-11-06 RX ADMIN — FENTANYL CITRATE 50 MCG: 50 INJECTION, SOLUTION INTRAMUSCULAR; INTRAVENOUS at 11:46

## 2023-11-06 RX ADMIN — FENTANYL CITRATE 50 MCG: 50 INJECTION, SOLUTION INTRAMUSCULAR; INTRAVENOUS at 12:22

## 2023-11-06 RX ADMIN — TRANEXAMIC ACID 1000 MG: 100 INJECTION, SOLUTION INTRAVENOUS at 12:10

## 2023-11-06 RX ADMIN — INSULIN LISPRO 4 UNITS: 100 INJECTION, SOLUTION INTRAVENOUS; SUBCUTANEOUS at 21:00

## 2023-11-06 RX ADMIN — SUGAMMADEX 200 MG: 100 INJECTION, SOLUTION INTRAVENOUS at 13:20

## 2023-11-06 RX ADMIN — HYDROCODONE BITARTRATE AND ACETAMINOPHEN 1 TABLET: 5; 325 TABLET ORAL at 21:20

## 2023-11-06 RX ADMIN — NITROFURANTOIN MONOHYDRATE/MACROCRYSTALS 100 MG: 75; 25 CAPSULE ORAL at 18:08

## 2023-11-06 RX ADMIN — SODIUM CHLORIDE: 9 INJECTION, SOLUTION INTRAVENOUS at 11:36

## 2023-11-06 RX ADMIN — ACETAMINOPHEN 1000 MG: 500 TABLET ORAL at 10:52

## 2023-11-06 RX ADMIN — ATORVASTATIN CALCIUM 10 MG: 10 TABLET, FILM COATED ORAL at 20:10

## 2023-11-06 RX ADMIN — ASPIRIN 81 MG: 81 TABLET, COATED ORAL at 20:11

## 2023-11-06 RX ADMIN — METOPROLOL SUCCINATE 12.5 MG: 25 TABLET, EXTENDED RELEASE ORAL at 18:08

## 2023-11-06 RX ADMIN — HYDROMORPHONE HYDROCHLORIDE 0.4 MG: 2 INJECTION, SOLUTION INTRAMUSCULAR; INTRAVENOUS; SUBCUTANEOUS at 13:25

## 2023-11-06 RX ADMIN — ONDANSETRON 4 MG: 2 INJECTION INTRAMUSCULAR; INTRAVENOUS at 13:02

## 2023-11-06 RX ADMIN — SODIUM CHLORIDE: 9 INJECTION, SOLUTION INTRAVENOUS at 14:53

## 2023-11-06 RX ADMIN — PROPOFOL 100 MG: 10 INJECTION, EMULSION INTRAVENOUS at 11:46

## 2023-11-06 RX ADMIN — HYDROMORPHONE HYDROCHLORIDE 0.2 MG: 2 INJECTION, SOLUTION INTRAMUSCULAR; INTRAVENOUS; SUBCUTANEOUS at 13:19

## 2023-11-06 RX ADMIN — Medication 2000 MG: at 20:09

## 2023-11-06 RX ADMIN — ROPINIROLE HYDROCHLORIDE 4 MG: 2 TABLET, FILM COATED ORAL at 20:10

## 2023-11-06 RX ADMIN — CEFAZOLIN 2 G: 1 INJECTION, POWDER, FOR SOLUTION INTRAMUSCULAR; INTRAVENOUS at 12:03

## 2023-11-06 RX ADMIN — AMLODIPINE BESYLATE 5 MG: 5 TABLET ORAL at 18:10

## 2023-11-06 ASSESSMENT — PAIN DESCRIPTION - DESCRIPTORS
DESCRIPTORS: ACHING
DESCRIPTORS: ACHING

## 2023-11-06 ASSESSMENT — PAIN SCALES - GENERAL
PAINLEVEL_OUTOF10: 0
PAINLEVEL_OUTOF10: 0
PAINLEVEL_OUTOF10: 3
PAINLEVEL_OUTOF10: 6
PAINLEVEL_OUTOF10: 0
PAINLEVEL_OUTOF10: 0

## 2023-11-06 ASSESSMENT — PAIN - FUNCTIONAL ASSESSMENT: PAIN_FUNCTIONAL_ASSESSMENT: 0-10

## 2023-11-06 ASSESSMENT — PAIN DESCRIPTION - FREQUENCY: FREQUENCY: CONTINUOUS

## 2023-11-06 ASSESSMENT — PAIN DESCRIPTION - LOCATION: LOCATION: HIP

## 2023-11-06 ASSESSMENT — PAIN DESCRIPTION - ORIENTATION: ORIENTATION: RIGHT

## 2023-11-06 NOTE — INTERVAL H&P NOTE
Update History & Physical    The patient's History and Physical of   October 23, 2023     Patient will be having : Procedure(s):  HIP TOTAL ARTHROPLASTY WITH DEEP HARDWARE REMOVAL 7.3 NABILA SCREW  The surgical site was confirmed by the  patient and me. There was no change. Or updates at this time. Patient did obtain medical and cardiac  clearance. Patient has been NPO since midnight. No blood thinners in the past 5-7 days. Stopped Plavix 10 days ago and Aspirin 5 days ago. Patient took  nothing  this am.     Patient denies any personal or family problems with anesthesia. Patient was physically assessed, including cardiovascular and respiratory. Patient comes using walker. Patient understands and wants to proceed with the procedure.      Electronically signed by Wynelle Krabbe, APRN - CNP on 11/6/2023 at 9:51 AM    Note marked for cosign by provider

## 2023-11-06 NOTE — BRIEF OP NOTE
Brief Postoperative Note      Patient: Ralph Desai  YOB: 1940  MRN: [id number]    Date of Procedure: 11/6/2023    Pre-Op Diagnosis Codes:     * Osteoarthritis of right hip, unspecified osteoarthritis type [M16.11]    Post-Op Diagnosis: Same       Procedure(s):  HIP TOTAL ARTHROPLASTY WITH DEEP HARDWARE REMOVAL 7.3 NABILA SCREW    Surgeon(s):  MD Cathy Sood [place]    Assistant:  * No surgical staff found *    Anesthesia: General    Estimated Blood Loss (mL): 446     Complications: None    Specimens:   * No specimens in log *    Implants:  Implant Name Type Inv.  Item Serial No.  Lot No. LRB No. Used Action   SHELL ACET SZ E [id number] 3 H OSSEOTI LIMIT H 2 MOBILITY G7 - [id number]  SHELL ACET SZ E [id number] 3 H OSSEOTI LIMIT H 2 MOBILITY G7  [employer]- [id number] Right 1 Implanted   LINER ACET HW E 36 MM LONGEVITY G7 - [id number]  LINER ACET HW E 36 MM LONGEVITY G7  [employer]- [id number] Right 1 Implanted   STEM FEM SZ 14 L148MM STD OFFSET DST HIP TI PPS TYP 1 TAPR - [id number]  STEM FEM SZ 14 L148MM STD OFFSET DST HIP TI PPS TYP 1 TAPR  [employer]- [id number] Right 1 Implanted   HEAD FEM [id number] -3MM OFFSET HIP CO CHROM TYP 1 TAPR MOD G7 - [id number]  HEAD FEM [id number] -3MM OFFSET HIP CO CHROM TYP 1 TAPR MOD G7  [employer]- [id number] Right 1 Implanted         Drains: * No LDAs found *    Findings: see dictation      Electronically signed by Ranjith Leblanc MD on 11/6/2023 at 1:12 PM

## 2023-11-06 NOTE — DISCHARGE INSTR - COC
saturated, in which case, call surgeon and request instructions. If dressing falls off, call surgeon. Aquacel dressing is waterproof and patient may shower as of POD #1. Prevena dressing is water-resistant and patient may also shower POD #1 with dressing in place, must place battery pack in a waterproof bag during shower. Ice affected area four times a day, for twenty minutes. Normal Conditions-These will improve with time and available comfort meaures:  Swelling in the operative leg is normal for a few weeks - this should reduce over time. Use mobility, ice and elevation to improve. Some post-operative pain is normal!  Use pain medications, ice & repositioning. Constipation related to the use of narcotic pain medications and decreased mobility is a common occurrence - increase fiber & water intake, take a stool softener, eat meals sitting upright and mobilize to help alleviate constipation. Slight warmth of operative leg is normal.  Will improve with time. Fatigue and moderate pain after therapy is very normal!  Use pain medications, ice, repositioning, distraction - music, tv, reading a book. Numbness near the incision site is normal - this will improve with time. NOTE: Ensure/Remind patient to go to their follow-up appointment with their orthopedic surgeon, which is usually scheduled for 7-10 days after the surgery. Abnormal Conditions - When to call the Surgeon:  Increasing/excessive redness, warmth or swelling at the incisional site not relieved with ice and elevation. Increasing/excessive pain not well-controlled by prescribed medications. Drainage or odor from or around the incision site.  (A little blood showing through the bandage is ok, but active leaking, of any color, coming out of the bandage is NOT normal.  Temperature above 101 degrees.   (A mild temp of 99 - 100 is normal - if temp gets to 101 you may use Tylenol once - if it does not improve, you may use a 2nd dose of

## 2023-11-06 NOTE — ANESTHESIA PRE PROCEDURE
Department of Anesthesiology  Preprocedure Note       Name:  Leonardo Macdonald   Age:  81 y.o.  :  1940                                          MRN:  [id number]         Date:  2023      Surgeon: Dipika Hewitt):  MD Son Farnsworth PA    Procedure: Procedure(s):  HIP TOTAL ARTHROPLASTY WITH DEEP HARDWARE REMOVAL 7.3 NABILA SCREW    Medications prior to admission:   Prior to Admission medications    Medication Sig Start Date End Date Taking? Authorizing Provider   primidone (MYSOLINE) 50 MG tablet Take 1 tablet by mouth 2 times daily 10/26/23 7/22/24  Sandy Andujar MD   LIVALO 2 MG TABS tablet Take 1 tablet by mouth every evening 10/26/23 10/20/24  Sandy Andujar MD   glipiZIDE (GLUCOTROL) 5 MG tablet  23   Ana Carrington MD   nitrofurantoin, macrocrystal-monohydrate, (MACROBID) 100 MG capsule TAKE ONE (1) CAPSULE BY MOUTH ONCE DAILY 23   Beryle Peng D, MD   esomeprazole ([place]) 40 MG delayed release capsule Take 1 capsule by mouth every morning (before breakfast) 23   Sandy Andujar MD   aspirin 81 MG EC tablet Take 1 tablet by mouth daily 23   Fermín Valadez MD   ranolazine (RANEXA) 500 MG extended release tablet Take 1 tablet by mouth 2 times daily 23   Ana Carrington MD   rOPINIRole (REQUIP) 4 MG tablet Take 1 tablet by mouth nightly 23   Sandy Andujar MD   nystatin-triamcinolone [facility]) 595295-8.6 UNIT/GM-% cream Apply topically 4 times daily Apply topically 4 times daily. 23   Sandy Andujar MD   nystatin (MYCOSTATIN) 915541 UNIT/GM powder Apply 3 times daily.  23   Sandy Andujar MD   isosorbide mononitrate (IMDUR) 30 MG extended release tablet  22   Ana Carrington MD   estradiol (ESTRACE) 0.1 MG/GM vaginal cream Place 2 g vaginally daily    Ana Carrington MD   clopidogrel (PLAVIX) 75 MG tablet Take 1 tablet by mouth daily 22   Ana Carrington, MD   RECTIV 0.4 % rectal ointment  22

## 2023-11-06 NOTE — ACP (ADVANCE CARE PLANNING)
Advance Care Planning     Advance Care Planning Activator (Inpatient)  Conversation Note      Date of ACP Conversation: 11/6/2023     Conversation Conducted with: Patient with Decision Making Capacity    ACP Activator: Dante Mendiola RN    Health Care Decision Maker:     Current Designated Health Care Decision Maker:     Primary Decision Maker: Butchtrino  - [phone]  Click here to complete Healthcare Decision Makers including section of the Healthcare Decision Maker Relationship (ie \"Primary\")  Today we documented Decision Maker(s) consistent with Legal Next of Kin hierarchy. Care Preferences    Ventilation: \"If you were in your present state of health and suddenly became very ill and were unable to breathe on your own, what would your preference be about the use of a ventilator (breathing machine) if it were available to you? \"      Would the patient desire the use of ventilator (breathing machine)?: yes    \"If your health worsens and it becomes clear that your chance of recovery is unlikely, what would your preference be about the use of a ventilator (breathing machine) if it were available to you? \"     Would the patient desire the use of ventilator (breathing machine)?: No      Resuscitation  \"CPR works best to restart the heart when there is a sudden event, like a heart attack, in someone who is otherwise healthy. Unfortunately, CPR does not typically restart the heart for people who have serious health conditions or who are very sick. \"    \"In the event your heart stopped as a result of an underlying serious health condition, would you want attempts to be made to restart your heart (answer \"yes\" for attempt to resuscitate) or would you prefer a natural death (answer \"no\" for do not attempt to resuscitate)? \" yes       [] Yes   [x] No   Educated Patient / Serena Mckeon regarding differences between Advance Directives and portable DNR orders.     Length of ACP Conversation in minutes:

## 2023-11-07 ENCOUNTER — CARE COORDINATION (OUTPATIENT)
Dept: CARE COORDINATION | Facility: CLINIC | Age: 83
End: 2023-11-07

## 2023-11-07 VITALS
RESPIRATION RATE: 18 BRPM | WEIGHT: 150 LBS | TEMPERATURE: 98.1 F | SYSTOLIC BLOOD PRESSURE: 112 MMHG | OXYGEN SATURATION: 98 % | BODY MASS INDEX: 32.36 KG/M2 | DIASTOLIC BLOOD PRESSURE: 78 MMHG | HEART RATE: 60 BPM | HEIGHT: 57 IN

## 2023-11-07 LAB
BILIRUB UR QL STRIP: NEGATIVE
CLARITY UR: CLEAR
COLOR UR: YELLOW
COMMENT: ABNORMAL
ERYTHROCYTE [DISTWIDTH] IN BLOOD BY AUTOMATED COUNT: 15.6 % (ref 11.5–14.9)
GLUCOSE BLD-MCNC: 164 MG/DL (ref 65–105)
GLUCOSE BLD-MCNC: 171 MG/DL (ref 65–105)
GLUCOSE BLD-MCNC: 228 MG/DL (ref 65–105)
GLUCOSE UR STRIP-MCNC: ABNORMAL MG/DL
HCT VFR BLD AUTO: 35.1 % (ref 36–46)
HGB BLD-MCNC: 11.1 G/DL (ref 12–16)
HGB UR QL STRIP.AUTO: NEGATIVE
KETONES UR STRIP-MCNC: ABNORMAL MG/DL
LEUKOCYTE ESTERASE UR QL STRIP: NEGATIVE
MCH RBC QN AUTO: 30.2 PG (ref 26–34)
MCHC RBC AUTO-ENTMCNC: 31.6 G/DL (ref 31–37)
MCV RBC AUTO: 95.5 FL (ref 80–100)
NITRITE UR QL STRIP: NEGATIVE
PH UR STRIP: 5.5 [PH] (ref 5–8)
PLATELET # BLD AUTO: 116 K/UL (ref 150–450)
PMV BLD AUTO: 12.9 FL (ref 6–12)
PROT UR STRIP-MCNC: NEGATIVE MG/DL
RBC # BLD AUTO: 3.67 M/UL (ref 4–5.2)
REASON FOR REJECTION: NORMAL
SP GR UR STRIP: 1.03 (ref 1–1.03)
SPECIMEN SOURCE: NORMAL
UROBILINOGEN UR STRIP-ACNC: NORMAL EU/DL (ref 0–1)
WBC OTHER # BLD: 13.3 K/UL (ref 3.5–11)
ZZ NTE CLEAN UP: ORDERED TEST: NORMAL

## 2023-11-07 PROCEDURE — 6370000000 HC RX 637 (ALT 250 FOR IP): Performed by: ORTHOPAEDIC SURGERY

## 2023-11-07 PROCEDURE — 6370000000 HC RX 637 (ALT 250 FOR IP): Performed by: INTERNAL MEDICINE

## 2023-11-07 PROCEDURE — 97530 THERAPEUTIC ACTIVITIES: CPT

## 2023-11-07 PROCEDURE — 36415 COLL VENOUS BLD VENIPUNCTURE: CPT

## 2023-11-07 PROCEDURE — 97535 SELF CARE MNGMENT TRAINING: CPT

## 2023-11-07 PROCEDURE — 81003 URINALYSIS AUTO W/O SCOPE: CPT

## 2023-11-07 PROCEDURE — 97116 GAIT TRAINING THERAPY: CPT

## 2023-11-07 PROCEDURE — 2580000003 HC RX 258: Performed by: ORTHOPAEDIC SURGERY

## 2023-11-07 PROCEDURE — 85027 COMPLETE CBC AUTOMATED: CPT

## 2023-11-07 PROCEDURE — 82947 ASSAY GLUCOSE BLOOD QUANT: CPT

## 2023-11-07 PROCEDURE — 6360000002 HC RX W HCPCS: Performed by: ORTHOPAEDIC SURGERY

## 2023-11-07 PROCEDURE — 97110 THERAPEUTIC EXERCISES: CPT

## 2023-11-07 PROCEDURE — 99024 POSTOP FOLLOW-UP VISIT: CPT | Performed by: ORTHOPAEDIC SURGERY

## 2023-11-07 PROCEDURE — 99232 SBSQ HOSP IP/OBS MODERATE 35: CPT | Performed by: INTERNAL MEDICINE

## 2023-11-07 RX ORDER — HYDROCODONE BITARTRATE AND ACETAMINOPHEN 5; 325 MG/1; MG/1
1 TABLET ORAL EVERY 6 HOURS PRN
Qty: 28 TABLET | Refills: 0 | Status: SHIPPED | OUTPATIENT
Start: 2023-11-07 | End: 2023-11-14

## 2023-11-07 RX ADMIN — GLIPIZIDE 2.5 MG: 5 TABLET ORAL at 17:37

## 2023-11-07 RX ADMIN — HYDROCODONE BITARTRATE AND ACETAMINOPHEN 1 TABLET: 5; 325 TABLET ORAL at 05:30

## 2023-11-07 RX ADMIN — SODIUM CHLORIDE, PRESERVATIVE FREE 10 ML: 5 INJECTION INTRAVENOUS at 08:58

## 2023-11-07 RX ADMIN — PANTOPRAZOLE SODIUM 40 MG: 40 TABLET, DELAYED RELEASE ORAL at 05:07

## 2023-11-07 RX ADMIN — AMLODIPINE BESYLATE 5 MG: 5 TABLET ORAL at 08:57

## 2023-11-07 RX ADMIN — PRIMIDONE 50 MG: 50 TABLET ORAL at 08:57

## 2023-11-07 RX ADMIN — INSULIN LISPRO 2 UNITS: 100 INJECTION, SOLUTION INTRAVENOUS; SUBCUTANEOUS at 08:57

## 2023-11-07 RX ADMIN — ASPIRIN 81 MG: 81 TABLET, COATED ORAL at 08:57

## 2023-11-07 RX ADMIN — GLIPIZIDE 5 MG: 5 TABLET ORAL at 08:57

## 2023-11-07 RX ADMIN — Medication 2000 MG: at 05:11

## 2023-11-07 RX ADMIN — METOPROLOL SUCCINATE 12.5 MG: 25 TABLET, EXTENDED RELEASE ORAL at 08:57

## 2023-11-07 RX ADMIN — RANOLAZINE 500 MG: 500 TABLET, EXTENDED RELEASE ORAL at 08:57

## 2023-11-07 ASSESSMENT — PAIN SCALES - GENERAL
PAINLEVEL_OUTOF10: 0
PAINLEVEL_OUTOF10: 6

## 2023-11-07 NOTE — CARE COORDINATION
[employer] [facility] [employer] SNF 3DW Verification     [facility] 3DW Verification    For purposes of admission to SNF under the [employer] 3DW, the patient is an attributed [facility] beneficiary, but some eligibility criteria are still pending. Final 3DW verification outcome will be communicated once all criteria has been addressed.

## 2023-11-07 NOTE — CARE COORDINATION
Case Management Assessment  Initial Evaluation    Date/Time of Evaluation: 11/6/2023 4:31 PM  Assessment Completed by: Jimenez Spain RN    If patient is discharged prior to next notation, then this note serves as note for discharge by case management. Patient Name: Geovanna Emery                   YOB: 1940  Diagnosis: Osteoarthritis of right hip, unspecified osteoarthritis type [M16.11]  Arthritis of right hip [M16.11]                   Date / Time: 11/6/2023  9:12 AM    Patient Admission Status: Inpatient   Readmission Risk (Low < 19, Mod (19-27), High > 27): Readmission Risk Score: 15.6    Current PCP: Lauryn Ken MD  PCP verified by CM? Yes    Chart Reviewed: Yes      History Provided by: Patient  Patient Orientation: Alert and Oriented    Patient Cognition: Alert    Hospitalization in the last 30 days (Readmission):  No    If yes, Readmission Assessment in CM Navigator will be completed. Advance Directives:      Code Status: Full Code   Patient's Primary Decision Maker is: Legal Next of Kin    Primary Decision Maker: Ritchie Crawford - Spouse - [phone]    Discharge Planning:    Patient lives with: Spouse/Significant Other Type of Home: Trailer/Mobile Home  Primary Care Giver: Self  Patient Support Systems include: Spouse/Significant Other   Current Financial resources: Medicare  Current community resources: None  Current services prior to admission: Durable Medical Equipment            Current DME: Rushie Hutching, Shower Chair            Type of Home Care services:  OT, PT    ADLS  Prior functional level: Independent in ADLs/IADLs  Current functional level: Assistance with the following:, Mobility    PT AM-PAC:   /24  OT AM-PAC:   /24    Family can provide assistance at DC: Yes  Would you like Case Management to discuss the discharge plan with any other family members/significant others, and if so, who?  Yes (Spouse; Quentin Travis)  Plans to Return to Present Housing: Yes  Other Identified
stating she received documents and pt is able to be discharged to [facility] today. [facility] arranged with [facility] for 6[facility]. Informed primary care RN, Florencio Thapa, of this. Electronically signed by Meagan Goldsmith RN on 11/7/2023 at 2:49 PM    Faxed AVS to [facility]. Phone number for report: [phone]    Electronically signed by Meagan Goldsmith RN on 11/7/2023 at 2:55 PM    Notified pt's spouse, Mojgan Ramirez, who is at the bedside of transport time 6pm. Pt is sleeping.     Electronically signed by Meagan Goldsmith RN on 11/7/2023 at 3:05 PM    [facility] patient at 6pm.    Electronically signed by Meagan Goldsmith RN on 11/7/2023 at 3:18 PM
Care                              Discharge Destination         [facility]: Home Health Services       Address: [address] [facility] [address] [place] [address]       Phone: [phone]                       You are allergic to the following       You are allergic to the following       Allergen    Reactions      Iodides Anaphylaxis   Hives   Itching   \"Contrast dyes\"  This was added by someone but Patient states has had IVP dyes multiple times without problems and had a myelogram in Dec 2016 without problems       Povidone-Iodine Anaphylaxis   Hives   Swelling       Sulfa Antibiotics Hives   Swelling   Other reaction(s): Unknown   Other reaction(s): Intolerance-unknown   Hands, feet, mouth, eyes   Other reaction(s): Unknown       Betadine (Povidone Iodine) Hives       Cephalexin Hives   Swelling       Cephalexin Itching   Other reaction(s): Hallucinations   In 1969 received demerol and keflex together so was told to list both as allergies       Cozaar (Losartan) Nausea Only   Pt also gets sores on toungue       Cymbalta (Duloxetine Hcl) Diarrhea   Nausea And Vomiting   Weakness       Demerol Hives   Swelling       Doxycycline Other (See Comments)   Sore mouth            Meperidine Itching   Other reaction(s): Hallucinations          Morphine Hives   Itching       Penicillins Hives   Swelling   Itching   Other reaction(s): Intolerance-unknown   Other reaction(s): Unknown       Zithromax (Azithromycin) Other (See Comments)   Sore mouth          Ciprofloxacin Not Noted   Pt stated it gave her canker sores       Clindamycin/Lincomycin Not Noted       Etodolac Not Noted       Fluorescein Not Noted       Iodine Not Noted   Other reaction(s):  Intolerance-unknown       Lisinopril Not Noted   cough       Penicillin G Not Noted       Toviaz (Fesoterodine Fumarate Er) Not Noted       Clonazepam Other (See Comments)   From neuro: made her too sleepy       Metformin And Related Nausea And Vomiting

## 2023-11-07 NOTE — CONSULTS
the past 24 hour(s))   POC Glucose Fingerstick    Collection Time: 11/06/23 11:25 AM   Result Value Ref Range    POC Glucose 141 (H) 65 - 105 mg/dL   POC Glucose Fingerstick    Collection Time: 11/06/23  1:53 PM   Result Value Ref Range    POC Glucose 156 (H) 65 - 105 mg/dL       Imaging/Diagnostics:      Assessment :      Primary Problem  Arthritis of right hip    Active Hospital Problems    Diagnosis Date Noted    Arthritis of right hip [M16.11] 11/06/2023       Plan:     81-year-old female with multiple comorbidities is s/p total hip arthroplasty with deep hardware removal, medical team's been consulted for medical management    History of CAD s/p stent, recently had a stress test which was low risk, on aspirin Lipitor Lopressor Ranexa    History of TIA on aspirin, Plavix, cannot seen her home medications, was likely DC'd due to underlying surgery, can resume once okay with ORTHO    Diabetes mellitus, on glipizide at home which has been resumed, give insulin sliding scale    Likely neurogenic bladder, patient states that she has been straight cathing herself from last 20 years, okay to straight cath    Recently being treated for UTI, will check UA no indication of antibiotic at this point    Hypertension, blood pressure has been controlled on amlodipine and Lopressor    Check labs tomorrow    Prophylaxis once okay with operating surgeon. Consultations:   IP CONSULT TO SOCIAL WORK  IP CONSULT TO INTERNAL MEDICINE     Patient is admitted as inpatient status because of co-morbidities listed above, severity of signs and symptoms as outlined, requirement for current medical therapies and most importantly because of direct risk to patient if care not provided in a hospital setting. Natalya Moore MD  11/6/2023  8:00 PM    Copy sent to Dr. Jennifer Milligan MD    Please note that this chart was generated using voice recognition Dragon dictation software.   Although every effort was made to ensure the accuracy of this

## 2023-11-07 NOTE — OP NOTE
[facility]                  [place], [address] [facility]                                OPERATIVE REPORT    PATIENT NAME: Bhavna Ortega                        :        1940  MED REC NO:   [id number]                              ROOM:       2064  ACCOUNT NO:   [de-identified]                           ADMIT DATE: 2023  PROVIDER:     Chriss Garcia MD    DATE OF PROCEDURE:  2023    PREOPERATIVE DIAGNOSES:  Degenerative arthritis right hip, history of  percutaneous pinning right femoral neck fracture. POSTOPERATIVE DIAGNOSES:  Degenerative arthritis right hip, history of  percutaneous pinning right femoral neck fracture. OPERATION PERFORMED:  1. Right total hip arthroplasty. 2.  Removal of hardware deep to include three 7.3 cannulated screws. OPERATING SURGEON:  Ada Ayon. Shahid Foy MD.    FIRST ASSISTANT:  AC Davis, who was utilized for patient  positioning, wound retraction, suctioning, and skin closure. ANESTHESIA:  General.    ESTIMATED BLOOD LOSS:  300 mL. COMPLICATIONS:  None. SPECIMENS:  None. IMPLANTS:  [employer] Taperloc stem size 14 with a 52 mm acetabulum,  36 mm head with neutral length. DRAINS:  None. FINDINGS:  1. Three cannulated screws removed without too much difficulty,  actually first two came out quite easily, the third one was a little bit  difficult to find, but otherwise came out easily. 2.  Final PA hip showed the patient to be status post total hip  arthroplasty, acetabular cup a little bit horizontal, but very  acceptable. OPERATIVE PROCEDURE:  The patient was taken to the operating room,  placed under general anesthesia, positioned in lateral decubitus  position. Right lower extremity was prepped and draped in the usual  sterile fashion. A posterolateral approach to the hip with incision  through skin, subcutaneous fat and tensor fascia olesya.   At this  juncture, the cannulated screws

## 2023-11-07 NOTE — PLAN OF CARE
Problem: Discharge Planning  Goal: Discharge to home or other facility with appropriate resources  Outcome: Progressing  Flowsheets (Taken 11/6/2023 2000)  Discharge to home or other facility with appropriate resources: Identify barriers to discharge with patient and caregiver     Problem: Pain  Goal: Verbalizes/displays adequate comfort level or baseline comfort level  Outcome: Progressing   PRN medication given with good effect. Problem: Safety - Adult  Goal: Free from fall injury  Outcome: Progressing   ID band on. Nonslip socks provided. Bed in low position and hourly rounding in place.

## 2023-11-07 NOTE — CARE COORDINATION
[facility]    Patient has met all required criteria and is eligible for admission to an approved SNF affiliate under the [facility] ACO 3-day rule waiver. The ACO verification process is complete and case management has been notified of verification outcome.

## 2023-11-09 ENCOUNTER — CARE COORDINATION (OUTPATIENT)
Dept: CARE COORDINATION | Age: 83
End: 2023-11-09

## 2023-11-09 NOTE — CARE COORDINATION
Care Transitions Post-Acute Facility Update Call    2023    Patient: Yenifer Lazo Patient : 1940   MRN: [id number]  Reason for Admission:  R TKA secondary to OA  Discharge Date: 23 RARS: Readmission Risk Score: 11.1    Care Transitions Post Acute Facility Update    Care Transitions Interventions    Physical Therapy: Completed                                  Post Acute Facility Update   Post Acute Facility: [facility]    ELOS: 21 days      Nursing   Prescribed diet: Regular diet, Regular texture, Thin consistency    Skilled Medication therapies: PT / Lauren Ribera / RT   Medical equipment being used: aqucell to remain in place until follow up, posterior hip precautions, WBAT   Reported Nursing Updates: VS /58 T 97.6 p 70 r 18 bs 261 sp02 96% pain 0     PT straight caths self    Dr. Parminder [facility] Date: 23 Time: 1015a Transportation Method: TBD Office Location: [address] [place] [address] (entrance c) [place] [address] [facility] Phone Number: [phone]           Rehab/Functional   ADLs: Minimal Assistance   Rehab Notes: Prior Living Setting = Patient lived at home with others. Home Environment = 1 story,Shower,Stairs to enter residence,Handrails on stairs; # of Stairs to Enter = 4 steps; # Handrails = 2; Home Equipment: 4 steps to enter with B handrails. Patient has ramp and utilizes ramp entry to access mobile home. DME: shower chair, grab bars, hand held shower head, raised toilet seat, toilet rails, rollator, RW. Anticipated D/C Plan = Patient to live at home w/support/(A) from other       SW/Discharge Planning   Discharge Planning / Barriers:     Patient Goals: To walk out of here and go home.            Aj Shankar RN Post Acute Care Manager [phone]

## 2023-11-09 NOTE — DISCHARGE SUMMARY
HARDWARE REMOVAL 7.3 NABILA SCREW   X3     Consultations Obtained  IP CONSULT TO SOCIAL WORK  IP CONSULT TO [address] [place] [address] Course  uncomplicated    Discharge Medications       Medication List        START taking these medications      HYDROcodone-acetaminophen 5-325 MG per tablet  Commonly known as: Norco  Take 1 tablet by mouth every 6 hours as needed for Pain for up to 7 days. Intended supply: 7 days. Take lowest dose possible to manage pain Max Daily Amount: 4 tablets            CONTINUE taking these medications      acetaminophen 500 MG tablet  Commonly known as: TYLENOL  Take 2 tablets by mouth every 6 hours as needed for Pain     AMLODIPINE BENZOATE PO     aspirin 81 MG EC tablet  Take 1 tablet by mouth daily     clopidogrel 75 MG tablet  Commonly known as: PLAVIX     CRANBERRY PO     D-Mannose 350 MG Caps  Take 300 mg by mouth daily     esomeprazole 40 MG delayed release capsule  Commonly known as: NEXIUM  Take 1 capsule by mouth every morning (before breakfast)     estradiol 0.1 MG/GM vaginal cream  Commonly known as: ESTRACE     FreeStyle Mabel 14 Day [place] Tejaveronika Wyatt 14 Day Sensor Misc     glipiZIDE 5 MG tablet  Commonly known as: GLUCOTROL     hydrocortisone 2.5 % Crea rectal cream  Commonly known as: ANUSOL-HC  Place rectally 2 times daily     isosorbide mononitrate 30 MG extended release tablet  Commonly known as: IMDUR     lidocaine 2 % jelly  Commonly known as: XYLOCAINE     lidocaine 5 % ointment  Commonly known as: XYLOCAINE     Livalo 2 MG Tabs tablet  Generic drug: pitavastatin  Take 1 tablet by mouth every evening     metoprolol succinate 25 MG extended release tablet  Commonly known as: TOPROL XL     nitrofurantoin (macrocrystal-monohydrate) 100 MG capsule  Commonly known as: MACROBID  TAKE ONE (1) CAPSULE BY MOUTH ONCE DAILY     nystatin 735931 UNIT/GM powder  Commonly known as: MYCOSTATIN  Apply 3 times daily.      nystatin-triamcinolone 520850-0.3 UNIT/GM-%

## 2023-11-15 ENCOUNTER — CARE COORDINATION (OUTPATIENT)
Dept: CARE COORDINATION | Age: 83
End: 2023-11-15

## 2023-11-15 NOTE — CARE COORDINATION
Care Transitions Post-Acute Facility Update Call    11/15/2023    Patient: Ori Holt Patient : 1940   MRN: [id number]  Reason for Admission:  R TKA secondary to OA  Discharge Date: 23 RARS: Readmission Risk Score: 11.1    Care Transitions Post Acute Facility Update    Care Transitions Interventions    Physical Therapy: Completed                                  Post Acute Facility Update   Post Acute Facility: [facility]    ELOS: 21 days      Nursing   Prescribed diet: Regular diet, Regular texture, Thin consistency    Skilled Medication therapies: PT / Girma Ramp / RT   Medical equipment being used: aqucell to remain in place until follow up, posterior hip precautions, WBAT   Reported Nursing Updates: VS /72 T 97.6 P 75 R 18 bs 261 sp02 96% pain 0  wt 156    PT straight caths self    Dr. Scott [facility] Date: 23 Time: 1015a Transportation Method: TBD Office Location: [address] [place] [address] (entrance c) [place] [address] [facility] Phone Number: [phone]    Wounds : RT hip incision with Aqua cell dressing ; Z guard to buttocks        Rehab/Functional   ADLs: Minimal Assistance   Rehab Notes:   Balance Fair -  Bed Mobility  MIN  Sit to Stand Transfer  CGA  Transfers  CGA  Ambulating 150 ft   BIMS 10/15   Prior Living Setting = Patient lived at home with others. Home Environment = 1 story,Shower,Stairs to enter residence,Handrails on stairs; # of Stairs to Enter = 4 steps; # Handrails = 2; Home Equipment: 4 steps to enter with B handrails. Patient has ramp and utilizes ramp entry to access mobile home. DME: shower chair, grab bars, hand held shower head, raised toilet seat, toilet rails, rollator, RW. Anticipated D/C Plan = Patient to live at home w/support/(A) from other       SW/Discharge Planning  ELOS- 2-3 weeks   D/C-     Discharge Planning / Barriers:   Home with family/ [facility] [address]   Patient Goals: To walk out of here and go home.            Lee Love

## 2023-11-21 ENCOUNTER — OFFICE VISIT (OUTPATIENT)
Dept: ORTHOPEDIC SURGERY | Age: 83
End: 2023-11-21

## 2023-11-21 DIAGNOSIS — Z96.641 HISTORY OF TOTAL HIP ARTHROPLASTY, RIGHT: Primary | ICD-10-CM

## 2023-11-21 DIAGNOSIS — M16.11 ARTHRITIS OF RIGHT HIP: ICD-10-CM

## 2023-11-21 PROCEDURE — 99024 POSTOP FOLLOW-UP VISIT: CPT | Performed by: ORTHOPAEDIC SURGERY

## 2023-11-21 NOTE — PROGRESS NOTES
normal.         Thought Content: Thought content normal.    Staples removed. Incision healing well, no signs of infection. Provider Attestation:  Pinky Bonner, personally performed the services described in this documentation. All medical record entries made by the scribe were at my direction and in my presence. I have reviewed the chart and discharge instructions and agree that the records reflect my personal performance and is accurate and complete. Guy Combs MD 11/21/23       Scribe Attestation:  By signing my name below, Lisa Alexis, attest that this documentation has been prepared under the direction and in the presence of Dr. Aarti Nava. Electronically signed: Cary Paulino, 11/21/23     Please note that this chart was generated using voice recognition Dragon dictation software. Although every effort was made to ensure the accuracy of this automated transcription, some errors in transcription may have occurred.

## 2023-11-24 ENCOUNTER — OFFICE VISIT (OUTPATIENT)
Dept: PRIMARY CARE CLINIC | Age: 83
End: 2023-11-24

## 2023-11-24 VITALS
HEART RATE: 121 BPM | OXYGEN SATURATION: 90 % | WEIGHT: 160 LBS | SYSTOLIC BLOOD PRESSURE: 120 MMHG | DIASTOLIC BLOOD PRESSURE: 60 MMHG | BODY MASS INDEX: 34.62 KG/M2

## 2023-11-24 DIAGNOSIS — B35.4 TINEA CORPORIS: ICD-10-CM

## 2023-11-24 DIAGNOSIS — L89.312 PRESSURE INJURY OF RIGHT BUTTOCK, STAGE 2 (HCC): Primary | ICD-10-CM

## 2023-11-24 DIAGNOSIS — R06.02 SHORTNESS OF BREATH: ICD-10-CM

## 2023-11-24 DIAGNOSIS — D69.6 THROMBOCYTOPENIA (HCC): ICD-10-CM

## 2023-11-24 DIAGNOSIS — I25.10 CORONARY ARTERY DISEASE INVOLVING NATIVE CORONARY ARTERY OF NATIVE HEART WITHOUT ANGINA PECTORIS: ICD-10-CM

## 2023-11-24 RX ORDER — AMLODIPINE BESYLATE 2.5 MG/1
2.5 TABLET ORAL EVERY MORNING
COMMUNITY
Start: 2023-07-05

## 2023-11-24 RX ORDER — ATORVASTATIN CALCIUM 10 MG/1
10 TABLET, FILM COATED ORAL
COMMUNITY
Start: 2023-04-01

## 2023-11-24 RX ORDER — FLUCONAZOLE 150 MG/1
150 TABLET ORAL
Qty: 2 TABLET | Refills: 0 | Status: SHIPPED | OUTPATIENT
Start: 2023-11-24

## 2023-11-24 ASSESSMENT — ENCOUNTER SYMPTOMS: SHORTNESS OF BREATH: 1

## 2023-11-24 NOTE — PROGRESS NOTES
[address] PRIMARY CARE  [address]  [address] [facility] [address]  Dept: [address] [facility] Domonique Schroeder is a 81 y.o. female Established patient, who presents today for her medical conditions/complaintsas noted below. Chief Complaint   Patient presents with    Skin Problem     Pt is c/o bed sores since 11/12/2023. HPI:     HPI  Has a bed sore from rehab, painful area on right    Tried nystatin cream.  She is going to PT on 12/5/23    Reviewed prior notes Orthopedics  Reviewed previous Labs    Home from rehab facility for a week  Seems more SOB since rehab    LDL Cholesterol (mg/dL)   Date Value   04/26/2023 108   07/06/2020 85   10/17/2018 137 (H)     LDL Calculated (mg/dL)   Date Value   03/12/2015 47   08/18/2014 39       (goal LDL is <100)   AST (U/L)   Date Value   06/01/2023 24     ALT (U/L)   Date Value   06/01/2023 21     BUN (mg/dL)   Date Value   10/23/2023 24 (H)     Hemoglobin A1C (%)   Date Value   08/03/2021 6.9 (H)     TSH (uIU/mL)   Date Value   12/06/2022 2.07     BP Readings from Last 3 Encounters:   11/24/23 120/60   11/07/23 112/78   10/23/23 128/80          (goal 120/80)    Past Medical History:   Diagnosis Date    Age-related cognitive decline     Ankle pain     Left ankle fracture in ortho boat.     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    left with urinary incontinenc post surgical    Caffeine use     2 coffee/day, 1-2 tea per week    Cerebral artery occlusion with cerebral infarction [facility])     Chronic back pain     Chronic ITP (idiopathic thrombocytopenia) (HCC)     CVA (cerebral infarction) 2008, 2010    balance issues, short term memory loss    Diabetic gastroparesis ([address])     Diverticular disease 1986    GERD (gastroesophageal reflux disease)     Hiatal hernia     Hip fracture (HCC)     History of blood transfusion     History of decreased platelet count     Hyperlipemia     Hypertension

## 2023-11-28 ENCOUNTER — CARE COORDINATION (OUTPATIENT)
Dept: CARE COORDINATION | Age: 83
End: 2023-11-28

## 2023-11-28 NOTE — CARE COORDINATION
Care Transitions Initial Follow Up Call    Call within 2 business days of discharge: No    Patient Current Location:  Home: [address] [place] [address] [address] contacted the patient by telephone to perform post hospital discharge assessment. Verified name and  with patient as identifiers. Provided introduction to self, and explanation of the Post Acute Care Manager role. Patient: Kasandra Cesar Patient : 1940   MRN: [id number]  Reason for Admission:  R TKA secondary to OA   Discharge Date: 23 RARS: Readmission Risk Score: 11.1      Last Discharge Facility       Date Complaint Diagnosis Description Type Department Provider    23  Arthritis of right hip Admission (Discharged) [facility] JENNIFER Villela MD            Was this an external facility discharge? Yes, 2023  Discharge Facility: [address] to be reviewed by the provider   Additional needs identified to be addressed with provider: No  none               Method of communication with provider: none. Spoke with patient states she is doing well at home. Home care was coming out [facility] but now they are discharging because patient is going out to therapy now so home care is complete. Pt has transportation with seferino Tobias because she still can not drive and her  is legally blind so he can not drive. Post Acute Care Manager reviewed discharge instructions with  who verbalized understanding. The patient was given an opportunity to ask questions and does not have any further questions or concerns at this time. Were discharge instructions available to patient? Yes. Reviewed appropriate site of care based on symptoms and resources available to patient including: PCP  Home health. The patient agrees to contact the PCP office for questions related to their healthcare. Advance Care Planning:   Does patient have an Advance Directive: not on file.     Medication

## 2023-11-29 ENCOUNTER — HOSPITAL ENCOUNTER (OUTPATIENT)
Age: 83
Setting detail: SPECIMEN
Discharge: HOME | End: 2023-11-29

## 2023-11-29 ENCOUNTER — CARE COORDINATION (OUTPATIENT)
Dept: CARE COORDINATION | Age: 83
End: 2023-11-29

## 2023-11-29 DIAGNOSIS — R06.02 SHORTNESS OF BREATH: ICD-10-CM

## 2023-11-29 DIAGNOSIS — I25.10 CORONARY ARTERY DISEASE INVOLVING NATIVE CORONARY ARTERY OF NATIVE HEART WITHOUT ANGINA PECTORIS: ICD-10-CM

## 2023-11-29 DIAGNOSIS — D69.6 THROMBOCYTOPENIA (HCC): ICD-10-CM

## 2023-11-29 LAB
BASOPHILS # BLD: 0.06 K/UL (ref 0–0.2)
BASOPHILS NFR BLD: 1 % (ref 0–2)
BNP SERPL-MCNC: 302 PG/ML
D DIMER PPP FEU-MCNC: 8.09 UG/ML FEU (ref 0–0.57)
EOSINOPHIL # BLD: 0.11 K/UL (ref 0–0.44)
EOSINOPHILS RELATIVE PERCENT: 2 % (ref 1–4)
ERYTHROCYTE [DISTWIDTH] IN BLOOD BY AUTOMATED COUNT: 14.5 % (ref 11.8–14.4)
HCT VFR BLD AUTO: 35.1 % (ref 36.3–47.1)
HGB BLD-MCNC: 10.5 G/DL (ref 11.9–15.1)
IMM GRANULOCYTES # BLD AUTO: <0.03 K/UL (ref 0–0.3)
IMM GRANULOCYTES NFR BLD: 0 %
LYMPHOCYTES NFR BLD: 1.14 K/UL (ref 1.1–3.7)
LYMPHOCYTES RELATIVE PERCENT: 21 % (ref 24–43)
MCH RBC QN AUTO: 28.9 PG (ref 25.2–33.5)
MCHC RBC AUTO-ENTMCNC: 29.9 G/DL (ref 28.4–34.8)
MCV RBC AUTO: 96.7 FL (ref 82.6–102.9)
MONOCYTES NFR BLD: 0.41 K/UL (ref 0.1–1.2)
MONOCYTES NFR BLD: 8 % (ref 3–12)
NEUTROPHILS NFR BLD: 68 % (ref 36–65)
NEUTS SEG NFR BLD: 3.76 K/UL (ref 1.5–8.1)
NRBC BLD-RTO: 0 PER 100 WBC
PLATELET # BLD AUTO: ABNORMAL K/UL (ref 138–453)
PLATELET, FLUORESCENCE: 216 K/UL (ref 138–453)
PLATELETS.RETICULATED NFR BLD AUTO: 17.7 % (ref 1.1–10.3)
RBC # BLD AUTO: 3.63 M/UL (ref 3.95–5.11)
RBC # BLD: ABNORMAL 10*6/UL
WBC OTHER # BLD: 5.5 K/UL (ref 3.5–11.3)

## 2023-11-29 SDOH — ECONOMIC STABILITY: TRANSPORTATION INSECURITY
IN THE PAST 12 MONTHS, HAS LACK OF TRANSPORTATION KEPT YOU FROM MEETINGS, WORK, OR FROM GETTING THINGS NEEDED FOR DAILY LIVING?: NO

## 2023-11-29 SDOH — ECONOMIC STABILITY: INCOME INSECURITY: IN THE LAST 12 MONTHS, WAS THERE A TIME WHEN YOU WERE NOT ABLE TO PAY THE MORTGAGE OR RENT ON TIME?: NO

## 2023-11-29 SDOH — ECONOMIC STABILITY: HOUSING INSECURITY: IN THE LAST 12 MONTHS, HOW MANY PLACES HAVE YOU LIVED?: 1

## 2023-11-29 ASSESSMENT — SOCIAL DETERMINANTS OF HEALTH (SDOH)
HOW OFTEN DO YOU ATTENT MEETINGS OF THE CLUB OR ORGANIZATION YOU BELONG TO?: NEVER
IN A TYPICAL WEEK, HOW MANY TIMES DO YOU TALK ON THE PHONE WITH FAMILY, FRIENDS, OR NEIGHBORS?: THREE TIMES A WEEK
HOW OFTEN DO YOU ATTEND CHURCH OR RELIGIOUS SERVICES?: MORE THAN 4 TIMES PER YEAR
DO YOU BELONG TO ANY CLUBS OR ORGANIZATIONS SUCH AS CHURCH GROUPS UNIONS, FRATERNAL OR ATHLETIC GROUPS, OR SCHOOL GROUPS?: NO
HOW OFTEN DO YOU GET TOGETHER WITH FRIENDS OR RELATIVES?: ONCE A WEEK

## 2023-11-29 NOTE — CARE COORDINATION
Ambulatory Care Coordination Note  2023    Patient Current Location:  Home: [address] [address] [address]    ACM contacted the patient by telephone. Verified name and  with patient as identifiers. Provided introduction to self, and explanation of the ACM role. ACM: Jeannette Gordon, RN     Spoke with patient while calling for  care management call. Referred for care management by care transition nurse. She had right hip arthroplasty , went to rehab at [facility] for a few weeks. 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 Follows with neuro Dr. Wilber Tirado, surgeon Dr. Son Coyne, urology Dr. Jaek Beckford, hematology Dr. Dawn Stuart, neurosurgery Dr. Jaskaran Sandoval, pain mgmt Dr. Mayur Galindo, cardio Dr. Carolina Chapin. Her  is 81years old, legally blind, and she is mostly his caregiver. He has falls, unable to manage his own medications or appts. He has had to help her more since she had the surgery. She has a pressure sore left buttock, he is applying creams and has to help get her into bed as she's not able to do that on her own yet. DM- A1c 6.9, on glipizide 5 mg in morning and 2.5 mg in afternoon. Blood sugars remain good, checks mostly once a day. She was getting [facility] but they stopped coming. She will start outpatient therapy  in [place]. She is the  of the household but she got transportation to appts for them from [facility] until she can start driving again. No children and no other family in the area. They have [employer] to help at home. Doing fairly well getting around at home, just can't get into bed by herself and the chronic back pain also hinders it. No chest pain but does get dyspneic with activities.   CC Plan:   Follow up next week to review medications, appts, progress with

## 2023-11-30 ENCOUNTER — TELEPHONE (OUTPATIENT)
Dept: PRIMARY CARE CLINIC | Age: 83
End: 2023-11-30

## 2023-11-30 DIAGNOSIS — R06.02 SHORTNESS OF BREATH: Primary | ICD-10-CM

## 2023-11-30 DIAGNOSIS — R79.89 POSITIVE D DIMER: ICD-10-CM

## 2023-11-30 NOTE — TELEPHONE ENCOUNTER
Yes, take eliquis with plavix. If there is NOT a CLOT in lungs or legs she can stop taking it.  Gets tests asap

## 2023-11-30 NOTE — RESULT ENCOUNTER NOTE
+ D dimer and BNP.  Shows mild anemia  Needs stat V/Q scab to rule out a blood clot  Also needs venous scan legs to rule out clot  Orders in  Start blood thinner  Or she can go to ER

## 2023-11-30 NOTE — TELEPHONE ENCOUNTER
Pt  called in wanting to now if she should take the Eliquis that was sent in while already taking Plavix.

## 2023-12-01 ENCOUNTER — HOSPITAL ENCOUNTER (OUTPATIENT)
Age: 83
End: 2023-12-01
Payer: MEDICARE

## 2023-12-01 ENCOUNTER — HOSPITAL ENCOUNTER (OUTPATIENT)
Dept: VASCULAR LAB | Age: 83
End: 2023-12-01
Payer: MEDICARE

## 2023-12-01 ENCOUNTER — HOSPITAL ENCOUNTER (OUTPATIENT)
Dept: NUCLEAR MEDICINE | Age: 83
Discharge: HOME | End: 2023-12-03
Payer: MEDICARE

## 2023-12-01 ENCOUNTER — HOSPITAL ENCOUNTER (OUTPATIENT)
Dept: GENERAL RADIOLOGY | Age: 83
End: 2023-12-01
Payer: MEDICARE

## 2023-12-01 DIAGNOSIS — R06.02 SHORTNESS OF BREATH: ICD-10-CM

## 2023-12-01 DIAGNOSIS — R06.02 SHORTNESS OF BREATH: Primary | ICD-10-CM

## 2023-12-01 DIAGNOSIS — R79.89 POSITIVE D DIMER: ICD-10-CM

## 2023-12-01 PROCEDURE — 78582 LUNG VENTILAT&PERFUS IMAGING: CPT

## 2023-12-01 PROCEDURE — 2580000003 HC RX 258: Performed by: FAMILY MEDICINE

## 2023-12-01 PROCEDURE — 3430000000 HC RX DIAGNOSTIC RADIOPHARMACEUTICAL: Performed by: FAMILY MEDICINE

## 2023-12-01 PROCEDURE — A9540 TC99M MAA: HCPCS | Performed by: FAMILY MEDICINE

## 2023-12-01 PROCEDURE — 71046 X-RAY EXAM CHEST 2 VIEWS: CPT

## 2023-12-01 PROCEDURE — A9539 TC99M PENTETATE: HCPCS | Performed by: FAMILY MEDICINE

## 2023-12-01 PROCEDURE — 93970 EXTREMITY STUDY: CPT

## 2023-12-01 RX ORDER — KIT FOR THE PREPARATION OF TECHNETIUM TC 99M PENTETATE 20 MG/1
40 INJECTION, POWDER, LYOPHILIZED, FOR SOLUTION INTRAVENOUS; RESPIRATORY (INHALATION)
Status: COMPLETED | OUTPATIENT
Start: 2023-12-01 | End: 2023-12-01

## 2023-12-01 RX ORDER — SODIUM CHLORIDE 0.9 % (FLUSH) 0.9 %
10 SYRINGE (ML) INJECTION PRN
Status: DISCONTINUED | OUTPATIENT
Start: 2023-12-01 | End: 2023-12-04 | Stop reason: HOSPADM

## 2023-12-01 RX ADMIN — SODIUM CHLORIDE, PRESERVATIVE FREE 10 ML: 5 INJECTION INTRAVENOUS at 13:05

## 2023-12-01 RX ADMIN — SODIUM CHLORIDE, PRESERVATIVE FREE 10 ML: 5 INJECTION INTRAVENOUS at 13:04

## 2023-12-01 RX ADMIN — KIT FOR THE PREPARATION OF TECHNETIUM TC 99M PENTETATE 41.3 MILLICURIE: 20 INJECTION, POWDER, LYOPHILIZED, FOR SOLUTION INTRAVENOUS; RESPIRATORY (INHALATION) at 12:36

## 2023-12-01 RX ADMIN — Medication 8.1 MILLICURIE: at 13:04

## 2023-12-04 PROCEDURE — 93970 EXTREMITY STUDY: CPT | Performed by: STUDENT IN AN ORGANIZED HEALTH CARE EDUCATION/TRAINING PROGRAM

## 2023-12-05 ENCOUNTER — HOSPITAL ENCOUNTER (OUTPATIENT)
Dept: PHYSICAL THERAPY | Age: 83
Setting detail: THERAPIES SERIES
Discharge: HOME | End: 2023-12-05
Payer: MEDICARE

## 2023-12-05 PROCEDURE — 97162 PT EVAL MOD COMPLEX 30 MIN: CPT

## 2023-12-05 PROCEDURE — 97530 THERAPEUTIC ACTIVITIES: CPT

## 2023-12-05 NOTE — THERAPY EVALUATION
Likely secondary to dehydration versus early sepsis versus metformin use.  Hold metformin.  Continue IV fluid hydration.  Trend lactate level.  Check serum ketones.  Check CPK level.     improved B LE strength to 5/5 in order to demonstrate improved stability/strength necessary for unrestricted ADLs  Pt to improve gait speed to at least 1.0m/s in order to be able to ambulate throughout her home and community with increased safety ability to react to changing environments. Pt will decrease WOMAC functional disability to <20% in order to demonstrate improved functional tolerances at PLOF with minimal restriction/dysfunction  Pt to be able to stand for 10 minutes straight without increase in fatigue or pain so that she is able to cook and clean around her house closer to a level of functioning that she had prior to surgery. Pt will self report no falls in 6 weeks in order to show improvements in stability and strength so she can ambulate around the community and her home with increased safety. Pt will demonstrate independence with a long term HEP for continued progress/maintenance after completion of PT  *additional goals to be added as needed          Functional Assessment Used: WOMAC   Current Status Score: 75/96 = 78% functional limitation  Goal Status Score: <20% functional limitation    Evaluation Complexity:  History (Personal factors, comorbidities) [] 0 [] 1-2 [x] 3+   Exam (limitations, restrictions) [] 1-2 [x] 3 [] 4+   Clinical presentation (progression) [] Stable [x] Evolving  [] Unstable   Decision Making [] Low [x] Moderate [] High    [] Low Complexity [x] Moderate Complexity [] High Complexity     Rehab Potential:  [x] Good  [] Fair  [] Poor   Suggested Professional Referral:  [x] No  [] Yes:  Barriers to Goal Achievement[de-identified]  [] No  [x] Yes: 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 Education:  [x] Plans/Goals, Risks/Benefits discussed  [] Home exercise program  Method of Education: [x] Verbal  [] Demo  [] Written  Comprehension of Education:  [x] Verbalizes understanding.   [] Demonstrates

## 2023-12-06 ENCOUNTER — CARE COORDINATION (OUTPATIENT)
Dept: CARE COORDINATION | Age: 83
End: 2023-12-06

## 2023-12-06 SDOH — ECONOMIC STABILITY: FOOD INSECURITY: WITHIN THE PAST 12 MONTHS, YOU WORRIED THAT YOUR FOOD WOULD RUN OUT BEFORE YOU GOT MONEY TO BUY MORE.: NEVER TRUE

## 2023-12-06 SDOH — ECONOMIC STABILITY: FOOD INSECURITY: WITHIN THE PAST 12 MONTHS, THE FOOD YOU BOUGHT JUST DIDN'T LAST AND YOU DIDN'T HAVE MONEY TO GET MORE.: NEVER TRUE

## 2023-12-06 NOTE — CARE COORDINATION
Ambulatory Care Coordination Note  2023    Patient Current Location:  Home: [address]     ACM contacted the patient by telephone. Verified name and  with patient as identifiers. Still has some dyspnea and a dry cough. Cough wakes her up during the night. No wheezing and is not getting worse. D dimer elevated but CT chest and venous studies of legs negative for any clots so she stopped the Eliquis. Continues to take Plavix and baby ASA. Her pressure ulcer on left buttock is all healed, she uses a donut sometimes to sit on since . Will start aqua therapy this week, has transportation arranged. Doing well ambulating. Is cooking some now and doing laundry, takes frequent rest breaks. No dizziness and feels steady on her feet. Not much pain but takes Tylenol PM at HS to help her relax. Some swelling of right (operative) leg, was told this is normal, starting to get less. She weighs herself daily and has been the same around 153. She applies ice to inner and outer thigh at HS-covers skin first to protect against burn. She checks pulse ox, BP, weight and heart rate daily at home. DM- blood sugars stable, higher in morning but always under 160. Checks usually three times a day and has not fluctuated much. CC Plan:   -Follow up in a week to check on therapy progress, dyspnea and cough symptoms. Diabetes Assessment    Medic Alert ID: No  Meal Planning: Avoidance of concentrated sweets   How often do you test your blood sugar?: Meals   Do you have barriers with adherence to non-pharmacologic self-management interventions?  (Nutrition/Exercise/Self-Monitoring): Yes   Have you ever had to go to the ED for symptoms of low blood sugar?: No       Do you have hyperglycemia symptoms?: No   Do you have hypoglycemia symptoms?: No   Last Blood Sugar Value: 161   Blood Sugar Monitoring Regimen: Before Meals   Blood Sugar Trends: No Change         and   General

## 2023-12-08 ENCOUNTER — HOSPITAL ENCOUNTER (OUTPATIENT)
Dept: PHYSICAL THERAPY | Age: 83
Setting detail: THERAPIES SERIES
Discharge: HOME | End: 2023-12-08
Payer: MEDICARE

## 2023-12-08 PROCEDURE — 97113 AQUATIC THERAPY/EXERCISES: CPT

## 2023-12-08 NOTE — FLOWSHEET NOTE
Vasocompression     [] Gait Training     [] Dry needling        [] 1 or 2 muscles        [] 3 or more muscles     []  Other     Total Billable time 30 2     Time In:1005              Time Out: 7845    Electronically signed by:  Laurel Marcelino PTA

## 2023-12-11 ENCOUNTER — HOSPITAL ENCOUNTER (OUTPATIENT)
Dept: PHYSICAL THERAPY | Age: 83
Setting detail: THERAPIES SERIES
Discharge: HOME | End: 2023-12-11
Payer: MEDICARE

## 2023-12-11 PROCEDURE — 97113 AQUATIC THERAPY/EXERCISES: CPT

## 2023-12-11 NOTE — FLOWSHEET NOTE
[x] [facility]  [address]  [place]: [phone]   F: [phone]    Physical Therapy Daily Treatment Note    Date:  2023  Patient Name:  Jeannie Marr    :  1940  MRN: [id number]  Physician: Damion Ayala MA                                 Insurance: MEDICARE PART A AND B (TRACK CAP); [employer] PPO  Medical Diagnosis: O38.000 (ICD-10-CM): Presence of Right Artificial Hip Joint    Rehab Codes: R25 pain , M25.60 stiffness, R53.1 weakness  Onset Date: 23-date of replacement                 Next 's appt: 23: post op with Dr. Ranjith Layton  Visit# / total visits: 3/18  Cancels/No Shows: 0/0    Subjective: Denies groin pain- describes pain as a deep wache. Eh [place] to get back to driving as she and her  have a lot of appointments and it's getting harder to get to all of them. Unsure of her ability to lift her foot for the gas and brake pedal at this time. Pain:  [] Yes  [x] No Location: Right lateral thigh down to R knee  Pain Rating: (0-10 scale) 6/10  Pain altered Tx:  [x] No  [] Yes  Action:  Comments: Reviewed benefits of aquatic therapy and importance of upright posture with core activation throughout program    Objective:    [address] Exercise Log  Aquatic, Hip & DLS Program- Phase 1    Date of Eval:  23                             Primary PT: Lucy Glover, ABIGAIL  Diagnosis: Things to Focus On (goals): B LE strengthening, ROM, Stability, endurance training         PRECAUTIONS: WBAT-- R ISHAN Posterior approach-no hip flexion >90, no hip IR, no adduction past midline     [x] Medicare - TRACK CAP      Pt is 4'9\" and uses family changing room with  to get dressed.     Date 23      Visit # 2/12 3/18      Walk F/L/R 2 Laps @ rail 2 Laps @ Rail +R      Marching 10x 10x Add Lap     Squats 10x 10x5\"  Low Box      Step-Ups F/L Low 10x Low F/L      Step Down F/L        Heel-toe

## 2023-12-13 ENCOUNTER — APPOINTMENT (OUTPATIENT)
Dept: PHYSICAL THERAPY | Age: 83
End: 2023-12-13
Payer: MEDICARE

## 2023-12-15 ENCOUNTER — APPOINTMENT (OUTPATIENT)
Dept: PHYSICAL THERAPY | Age: 83
End: 2023-12-15
Payer: MEDICARE

## 2023-12-15 ENCOUNTER — CARE COORDINATION (OUTPATIENT)
Dept: CARE COORDINATION | Age: 83
End: 2023-12-15

## 2023-12-22 ENCOUNTER — HOSPITAL ENCOUNTER (OUTPATIENT)
Dept: PHYSICAL THERAPY | Age: 83
Setting detail: THERAPIES SERIES
Discharge: HOME | End: 2023-12-22
Payer: MEDICARE

## 2023-12-22 NOTE — FLOWSHEET NOTE
[x] [facility]  [address]  [place]: [phone]   F: [phone]     Physical Therapy Cancel/No Show note    Date: 2023  Patient:  Tayler Solano  : 1940  MRN: [id number]    Visit Count:   Cancels/No Shows to date:     For today's appointment patient:    [x]  Cancelled    [] Rescheduled appointment    [] No-show     Reason given by patient:    [x]  Patient ill per , pt called to cx due to not feeling well.    []  Conflicting appointment    [] No transportation      [] Conflict with work    [] No reason given    [] Weather related    [] [id number]    [] Other:      Comments:        [x] Next appointment was confirmed with     Electronically signed by: Bria Akhtar PTA

## 2023-12-26 ENCOUNTER — TELEPHONE (OUTPATIENT)
Dept: PRIMARY CARE CLINIC | Age: 83
End: 2023-12-26

## 2023-12-26 DIAGNOSIS — M79.89 LEG SWELLING: Primary | ICD-10-CM

## 2023-12-26 NOTE — TELEPHONE ENCOUNTER
She needs to get a D Dimer blood test today and if + go to the ER, or just go to the ER   She can switch her spouse's appointment to another day and take his slot at 140. Or it looks like there is an opening at 11 am

## 2023-12-26 NOTE — TELEPHONE ENCOUNTER
Pt declined the ER. I advised to her that she should go due to being worried about a possible blood clot. She is declined getting the blood work done and declined the appointment tomorrow.

## 2023-12-26 NOTE — TELEPHONE ENCOUNTER
Patient recently had surgery, now has foot/leg swelling.    Patient states she contacted her surgeon however he referred back to PCP. Patient states she has compression stockings but is unable to get them on due to the water retention. Patient is keeping feet up often.    Patients spouse has an appointment tomorrow, 12/27 at 1:40PM, but there was only one opening so I could not schedule patient in. Please advise.

## 2023-12-27 ENCOUNTER — HOSPITAL ENCOUNTER (OUTPATIENT)
Dept: PHYSICAL THERAPY | Age: 83
Setting detail: THERAPIES SERIES
Discharge: HOME | End: 2023-12-27
Payer: MEDICARE

## 2023-12-27 ENCOUNTER — CARE COORDINATION (OUTPATIENT)
Dept: CARE COORDINATION | Age: 83
End: 2023-12-27

## 2023-12-27 ENCOUNTER — TELEPHONE (OUTPATIENT)
Dept: ORTHOPEDIC SURGERY | Age: 83
End: 2023-12-27

## 2023-12-27 NOTE — FLOWSHEET NOTE
[x] [facility]  [address]  [place]: [phone]   F: [phone]     Physical Therapy Cancel/No Show note    Date: 2023  Patient: Jeannie Marr  : 1940  MRN: [id number]    Visit Count:   Cancels/No Shows to date:     For today's appointment patient:    [x]  Cancelled    [] Rescheduled appointment    [] No-show     Reason given by patient:    []  Patient Ill    []  Conflicting appointment    [] No transportation      [] Conflict with work    [] No reason given    [] Weather related    [] COVID-19    [x] Other:      Comments:  Patient's  fell and having a hard time moving her leg.       [x] Next appointment was confirmed with     Electronically signed by: Arnulfo Aceves PTA

## 2023-12-27 NOTE — CARE COORDINATION
Ambulatory Care Coordination Note  2023    Patient Current Location:  Home: [address] [address]     ACM contacted the patient by telephone. Verified name and  with patient as identifiers. She sat in ED for 6 hours while  there and now she has severe lefthip pain radiating to back and down leg, couldn't go to therapy today, very hard for her to sit, didn't think she could get in the car. She is doing some stretching exercises and applying ice which helps a little, taking Tylenol. Still some right leg swelling since hip surgery, can't get compression stockings on due to the swelling and also pain. No dyspnea or worse cough. She was unable to go to PCP office for appt to address. Declined to get a D Dimer test done to evaluate for clot, she will monitor for now. Does not have pain in the lower leg and has no surgical pain. Sees ortho again in 3 weeks. Discussed if swelling doesn't improve or gets worse or if has redness, needs to go to ED. She has been feeling well, denied bowel or bladder concerns, is eating well. CC Plan:   -Follow up in a few weeks to evaluate symptoms, review progress. Diabetes Assessment    Medic Alert ID: No  Meal Planning: Avoidance of concentrated sweets   How often do you test your blood sugar?: Daily   Do you have barriers with adherence to non-pharmacologic self-management interventions? (Nutrition/Exercise/Self-Monitoring): Yes   Have you ever had to go to the ED for symptoms of low blood sugar?: No       No patient-reported symptoms         and   General Assessment    Do you have any symptoms that are causing concern?: Yes  Progression since Onset: Unchanged  Reported Symptoms: Pain (Comment: right hip/sciatica pain)             Offered patient enrollment in the Remote Patient Monitoring (RPM) program for in-home monitoring: Patient declined.     Lab Results       None                 Goals Addressed                   This Visit's

## 2023-12-27 NOTE — TELEPHONE ENCOUNTER
Patient called stating that she is having severe left side pain. Stating it started when she was in the ER with her  and sat for 6 hours . She states the pain is from lower back down past knee. She was advised she to try walking, rest icing and or heat to help with pain.  Patient did not wish to schedule an appointment with  at this time

## 2023-12-28 ENCOUNTER — HOSPITAL ENCOUNTER (INPATIENT)
Age: 83
LOS: 5 days | Discharge: NURSING, REHAB OR LONG-TERM CARE | DRG: 554 | End: 2024-01-02
Attending: EMERGENCY MEDICINE | Admitting: INTERNAL MEDICINE
Payer: MEDICARE

## 2023-12-28 ENCOUNTER — APPOINTMENT (OUTPATIENT)
Dept: GENERAL RADIOLOGY | Age: 83
DRG: 554 | End: 2023-12-28
Payer: MEDICARE

## 2023-12-28 ENCOUNTER — APPOINTMENT (OUTPATIENT)
Dept: CT IMAGING | Age: 83
DRG: 554 | End: 2023-12-28
Payer: MEDICARE

## 2023-12-28 DIAGNOSIS — S70.02XA CONTUSION OF LEFT HIP, INITIAL ENCOUNTER: ICD-10-CM

## 2023-12-28 DIAGNOSIS — N30.00 ACUTE CYSTITIS WITHOUT HEMATURIA: ICD-10-CM

## 2023-12-28 DIAGNOSIS — L89.312 PRESSURE INJURY OF RIGHT BUTTOCK, STAGE 2 (HCC): ICD-10-CM

## 2023-12-28 DIAGNOSIS — Z96.641 HISTORY OF TOTAL HIP ARTHROPLASTY, RIGHT: ICD-10-CM

## 2023-12-28 DIAGNOSIS — S76.012A HIP STRAIN, LEFT, INITIAL ENCOUNTER: Primary | ICD-10-CM

## 2023-12-28 DIAGNOSIS — N39.0 URINARY TRACT INFECTION WITHOUT HEMATURIA, SITE UNSPECIFIED: ICD-10-CM

## 2023-12-28 DIAGNOSIS — R60.0 LEG EDEMA: ICD-10-CM

## 2023-12-28 LAB
ALBUMIN SERPL-MCNC: 3.9 G/DL (ref 3.5–5.2)
ALP SERPL-CCNC: 101 U/L (ref 35–104)
ALT SERPL-CCNC: 9 U/L (ref 5–33)
ANION GAP SERPL CALCULATED.3IONS-SCNC: 11 MMOL/L (ref 9–17)
AST SERPL-CCNC: 13 U/L
BACTERIA URNS QL MICRO: ABNORMAL
BASOPHILS # BLD: 0.1 K/UL (ref 0–0.2)
BASOPHILS NFR BLD: 3 % (ref 0–2)
BILIRUB SERPL-MCNC: 0.3 MG/DL (ref 0.3–1.2)
BILIRUB UR QL STRIP: NEGATIVE
BUN SERPL-MCNC: 17 MG/DL (ref 8–23)
CALCIUM SERPL-MCNC: 9.4 MG/DL (ref 8.6–10.4)
CHLORIDE SERPL-SCNC: 106 MMOL/L (ref 98–107)
CLARITY UR: CLEAR
CO2 SERPL-SCNC: 24 MMOL/L (ref 20–31)
COLOR UR: YELLOW
CREAT SERPL-MCNC: 0.7 MG/DL (ref 0.5–0.9)
CRP SERPL HS-MCNC: 4.7 MG/L (ref 0–5)
EOSINOPHIL # BLD: 0.1 K/UL (ref 0–0.4)
EOSINOPHILS RELATIVE PERCENT: 2 % (ref 0–4)
EPI CELLS #/AREA URNS HPF: ABNORMAL /HPF
ERYTHROCYTE [DISTWIDTH] IN BLOOD BY AUTOMATED COUNT: 15.2 % (ref 11.5–14.9)
ERYTHROCYTE [SEDIMENTATION RATE] IN BLOOD BY PHOTOMETRIC METHOD: 29 MM/HR (ref 0–30)
GFR SERPL CREATININE-BSD FRML MDRD: >60 ML/MIN/1.73M2
GLUCOSE BLD-MCNC: 189 MG/DL (ref 65–105)
GLUCOSE SERPL-MCNC: 159 MG/DL (ref 70–99)
GLUCOSE UR STRIP-MCNC: ABNORMAL MG/DL
HCT VFR BLD AUTO: 36.7 % (ref 36–46)
HGB BLD-MCNC: 11.5 G/DL (ref 12–16)
HGB UR QL STRIP.AUTO: ABNORMAL
KETONES UR STRIP-MCNC: NEGATIVE MG/DL
LEUKOCYTE ESTERASE UR QL STRIP: ABNORMAL
LYMPHOCYTES NFR BLD: 1 K/UL (ref 1–4.8)
LYMPHOCYTES RELATIVE PERCENT: 18 % (ref 24–44)
MCH RBC QN AUTO: 27.8 PG (ref 26–34)
MCHC RBC AUTO-ENTMCNC: 31.4 G/DL (ref 31–37)
MCV RBC AUTO: 88.4 FL (ref 80–100)
MONOCYTES NFR BLD: 0.3 K/UL (ref 0.1–1.3)
MONOCYTES NFR BLD: 6 % (ref 1–7)
NEUTROPHILS NFR BLD: 71 % (ref 36–66)
NEUTS SEG NFR BLD: 3.8 K/UL (ref 1.3–9.1)
NITRITE UR QL STRIP: NEGATIVE
PH UR STRIP: 7 [PH] (ref 5–8)
PLATELET # BLD AUTO: 132 K/UL (ref 150–450)
PMV BLD AUTO: 12 FL (ref 6–12)
POTASSIUM SERPL-SCNC: 3.9 MMOL/L (ref 3.7–5.3)
PROT SERPL-MCNC: 7.2 G/DL (ref 6.4–8.3)
PROT UR STRIP-MCNC: NEGATIVE MG/DL
RBC # BLD AUTO: 4.15 M/UL (ref 4–5.2)
RBC #/AREA URNS HPF: ABNORMAL /HPF
SODIUM SERPL-SCNC: 141 MMOL/L (ref 135–144)
SP GR UR STRIP: 1.01 (ref 1–1.03)
UROBILINOGEN UR STRIP-ACNC: NORMAL EU/DL (ref 0–1)
WBC #/AREA URNS HPF: ABNORMAL /HPF
WBC OTHER # BLD: 5.3 K/UL (ref 3.5–11)

## 2023-12-28 PROCEDURE — 6370000000 HC RX 637 (ALT 250 FOR IP): Performed by: EMERGENCY MEDICINE

## 2023-12-28 PROCEDURE — 6360000002 HC RX W HCPCS: Performed by: EMERGENCY MEDICINE

## 2023-12-28 PROCEDURE — 1200000000 HC SEMI PRIVATE

## 2023-12-28 PROCEDURE — 99285 EMERGENCY DEPT VISIT HI MDM: CPT

## 2023-12-28 PROCEDURE — 87186 SC STD MICRODIL/AGAR DIL: CPT

## 2023-12-28 PROCEDURE — 6370000000 HC RX 637 (ALT 250 FOR IP): Performed by: INTERNAL MEDICINE

## 2023-12-28 PROCEDURE — 86140 C-REACTIVE PROTEIN: CPT

## 2023-12-28 PROCEDURE — 85652 RBC SED RATE AUTOMATED: CPT

## 2023-12-28 PROCEDURE — 82947 ASSAY GLUCOSE BLOOD QUANT: CPT

## 2023-12-28 PROCEDURE — 73700 CT LOWER EXTREMITY W/O DYE: CPT

## 2023-12-28 PROCEDURE — 87088 URINE BACTERIA CULTURE: CPT

## 2023-12-28 PROCEDURE — 87086 URINE CULTURE/COLONY COUNT: CPT

## 2023-12-28 PROCEDURE — 2580000003 HC RX 258: Performed by: INTERNAL MEDICINE

## 2023-12-28 PROCEDURE — 81001 URINALYSIS AUTO W/SCOPE: CPT

## 2023-12-28 PROCEDURE — P9612 CATHETERIZE FOR URINE SPEC: HCPCS

## 2023-12-28 PROCEDURE — 36415 COLL VENOUS BLD VENIPUNCTURE: CPT

## 2023-12-28 PROCEDURE — 73502 X-RAY EXAM HIP UNI 2-3 VIEWS: CPT

## 2023-12-28 PROCEDURE — 85025 COMPLETE CBC W/AUTO DIFF WBC: CPT

## 2023-12-28 PROCEDURE — 96374 THER/PROPH/DIAG INJ IV PUSH: CPT

## 2023-12-28 PROCEDURE — 6360000002 HC RX W HCPCS: Performed by: INTERNAL MEDICINE

## 2023-12-28 PROCEDURE — 80053 COMPREHEN METABOLIC PANEL: CPT

## 2023-12-28 PROCEDURE — 99223 1ST HOSP IP/OBS HIGH 75: CPT | Performed by: INTERNAL MEDICINE

## 2023-12-28 RX ORDER — NITROFURANTOIN 25; 75 MG/1; MG/1
100 CAPSULE ORAL EVERY 12 HOURS SCHEDULED
Status: COMPLETED | OUTPATIENT
Start: 2023-12-28 | End: 2024-01-02

## 2023-12-28 RX ORDER — AMLODIPINE BESYLATE 2.5 MG/1
2.5 TABLET ORAL EVERY MORNING
Status: DISCONTINUED | OUTPATIENT
Start: 2023-12-29 | End: 2023-12-30

## 2023-12-28 RX ORDER — ISOSORBIDE MONONITRATE 30 MG/1
30 TABLET, EXTENDED RELEASE ORAL DAILY
Status: DISCONTINUED | OUTPATIENT
Start: 2023-12-28 | End: 2024-01-02 | Stop reason: HOSPADM

## 2023-12-28 RX ORDER — MAGNESIUM SULFATE HEPTAHYDRATE 40 MG/ML
2000 INJECTION, SOLUTION INTRAVENOUS PRN
Status: DISCONTINUED | OUTPATIENT
Start: 2023-12-28 | End: 2024-01-02 | Stop reason: HOSPADM

## 2023-12-28 RX ORDER — CIPROFLOXACIN 500 MG/1
500 TABLET, FILM COATED ORAL EVERY 12 HOURS SCHEDULED
Status: DISCONTINUED | OUTPATIENT
Start: 2023-12-28 | End: 2023-12-28

## 2023-12-28 RX ORDER — OXYCODONE HYDROCHLORIDE 5 MG/1
5 TABLET ORAL EVERY 4 HOURS PRN
Status: DISCONTINUED | OUTPATIENT
Start: 2023-12-28 | End: 2024-01-02 | Stop reason: HOSPADM

## 2023-12-28 RX ORDER — POTASSIUM CHLORIDE 20 MEQ/1
40 TABLET, EXTENDED RELEASE ORAL PRN
Status: DISCONTINUED | OUTPATIENT
Start: 2023-12-28 | End: 2024-01-02 | Stop reason: HOSPADM

## 2023-12-28 RX ORDER — ONDANSETRON 4 MG/1
4 TABLET, ORALLY DISINTEGRATING ORAL EVERY 8 HOURS PRN
Status: DISCONTINUED | OUTPATIENT
Start: 2023-12-28 | End: 2024-01-02 | Stop reason: HOSPADM

## 2023-12-28 RX ORDER — ACETAMINOPHEN 500 MG
1000 TABLET ORAL ONCE
Status: COMPLETED | OUTPATIENT
Start: 2023-12-28 | End: 2023-12-28

## 2023-12-28 RX ORDER — ACETAMINOPHEN 650 MG/1
650 SUPPOSITORY RECTAL EVERY 6 HOURS PRN
Status: DISCONTINUED | OUTPATIENT
Start: 2023-12-28 | End: 2024-01-02 | Stop reason: HOSPADM

## 2023-12-28 RX ORDER — TRAMADOL HYDROCHLORIDE 50 MG/1
50 TABLET ORAL ONCE
Status: COMPLETED | OUTPATIENT
Start: 2023-12-28 | End: 2023-12-28

## 2023-12-28 RX ORDER — POLYETHYLENE GLYCOL 3350 17 G/17G
17 POWDER, FOR SOLUTION ORAL DAILY PRN
Status: DISCONTINUED | OUTPATIENT
Start: 2023-12-28 | End: 2024-01-02 | Stop reason: HOSPADM

## 2023-12-28 RX ORDER — INSULIN LISPRO 100 [IU]/ML
0-4 INJECTION, SOLUTION INTRAVENOUS; SUBCUTANEOUS NIGHTLY
Status: DISCONTINUED | OUTPATIENT
Start: 2023-12-28 | End: 2024-01-02 | Stop reason: HOSPADM

## 2023-12-28 RX ORDER — CLOPIDOGREL BISULFATE 75 MG/1
75 TABLET ORAL DAILY
Status: DISCONTINUED | OUTPATIENT
Start: 2023-12-28 | End: 2024-01-02 | Stop reason: HOSPADM

## 2023-12-28 RX ORDER — SODIUM CHLORIDE 0.9 % (FLUSH) 0.9 %
5-40 SYRINGE (ML) INJECTION EVERY 12 HOURS SCHEDULED
Status: DISCONTINUED | OUTPATIENT
Start: 2023-12-28 | End: 2024-01-02 | Stop reason: HOSPADM

## 2023-12-28 RX ORDER — POTASSIUM CHLORIDE 7.45 MG/ML
10 INJECTION INTRAVENOUS PRN
Status: DISCONTINUED | OUTPATIENT
Start: 2023-12-28 | End: 2024-01-02 | Stop reason: HOSPADM

## 2023-12-28 RX ORDER — ROPINIROLE 2 MG/1
4 TABLET, FILM COATED ORAL NIGHTLY
Status: DISCONTINUED | OUTPATIENT
Start: 2023-12-28 | End: 2024-01-02 | Stop reason: HOSPADM

## 2023-12-28 RX ORDER — ACETAMINOPHEN 325 MG/1
650 TABLET ORAL EVERY 6 HOURS PRN
Status: DISCONTINUED | OUTPATIENT
Start: 2023-12-28 | End: 2024-01-02 | Stop reason: HOSPADM

## 2023-12-28 RX ORDER — ONDANSETRON 2 MG/ML
4 INJECTION INTRAMUSCULAR; INTRAVENOUS EVERY 6 HOURS PRN
Status: DISCONTINUED | OUTPATIENT
Start: 2023-12-28 | End: 2024-01-02 | Stop reason: HOSPADM

## 2023-12-28 RX ORDER — DEXTROSE MONOHYDRATE 100 MG/ML
INJECTION, SOLUTION INTRAVENOUS CONTINUOUS PRN
Status: DISCONTINUED | OUTPATIENT
Start: 2023-12-28 | End: 2024-01-02 | Stop reason: HOSPADM

## 2023-12-28 RX ORDER — INSULIN LISPRO 100 [IU]/ML
0-8 INJECTION, SOLUTION INTRAVENOUS; SUBCUTANEOUS
Status: DISCONTINUED | OUTPATIENT
Start: 2023-12-28 | End: 2024-01-02 | Stop reason: HOSPADM

## 2023-12-28 RX ORDER — RANOLAZINE 500 MG/1
500 TABLET, EXTENDED RELEASE ORAL 2 TIMES DAILY
Status: DISCONTINUED | OUTPATIENT
Start: 2023-12-28 | End: 2024-01-02 | Stop reason: HOSPADM

## 2023-12-28 RX ORDER — SODIUM CHLORIDE 9 MG/ML
INJECTION, SOLUTION INTRAVENOUS PRN
Status: DISCONTINUED | OUTPATIENT
Start: 2023-12-28 | End: 2024-01-02 | Stop reason: HOSPADM

## 2023-12-28 RX ORDER — ORPHENADRINE CITRATE 30 MG/ML
60 INJECTION INTRAMUSCULAR; INTRAVENOUS ONCE
Status: COMPLETED | OUTPATIENT
Start: 2023-12-28 | End: 2023-12-28

## 2023-12-28 RX ORDER — PANTOPRAZOLE SODIUM 40 MG/1
40 TABLET, DELAYED RELEASE ORAL
Status: DISCONTINUED | OUTPATIENT
Start: 2023-12-29 | End: 2024-01-02 | Stop reason: HOSPADM

## 2023-12-28 RX ORDER — ASPIRIN 81 MG/1
81 TABLET ORAL DAILY
Status: DISCONTINUED | OUTPATIENT
Start: 2023-12-28 | End: 2024-01-02 | Stop reason: HOSPADM

## 2023-12-28 RX ORDER — ATORVASTATIN CALCIUM 10 MG/1
10 TABLET, FILM COATED ORAL NIGHTLY
Status: DISCONTINUED | OUTPATIENT
Start: 2023-12-28 | End: 2024-01-02 | Stop reason: HOSPADM

## 2023-12-28 RX ORDER — ENOXAPARIN SODIUM 100 MG/ML
40 INJECTION SUBCUTANEOUS NIGHTLY
Status: DISCONTINUED | OUTPATIENT
Start: 2023-12-28 | End: 2024-01-02 | Stop reason: HOSPADM

## 2023-12-28 RX ORDER — NITROFURANTOIN 25; 75 MG/1; MG/1
100 CAPSULE ORAL 2 TIMES DAILY
Qty: 14 CAPSULE | Refills: 0 | Status: ON HOLD | OUTPATIENT
Start: 2023-12-28 | End: 2024-01-04

## 2023-12-28 RX ORDER — SODIUM CHLORIDE 0.9 % (FLUSH) 0.9 %
5-40 SYRINGE (ML) INJECTION PRN
Status: DISCONTINUED | OUTPATIENT
Start: 2023-12-28 | End: 2024-01-02 | Stop reason: HOSPADM

## 2023-12-28 RX ORDER — PRIMIDONE 50 MG/1
50 TABLET ORAL 2 TIMES DAILY
Status: DISCONTINUED | OUTPATIENT
Start: 2023-12-28 | End: 2024-01-02 | Stop reason: HOSPADM

## 2023-12-28 RX ORDER — ACETAMINOPHEN 500 MG
1000 TABLET ORAL EVERY 6 HOURS PRN
Status: DISCONTINUED | OUTPATIENT
Start: 2023-12-28 | End: 2023-12-28 | Stop reason: SDUPTHER

## 2023-12-28 RX ORDER — NITROFURANTOIN 25; 75 MG/1; MG/1
100 CAPSULE ORAL ONCE
Status: COMPLETED | OUTPATIENT
Start: 2023-12-28 | End: 2023-12-28

## 2023-12-28 RX ORDER — LANOLIN ALCOHOL/MO/W.PET/CERES
1000 CREAM (GRAM) TOPICAL WEEKLY
Status: DISCONTINUED | OUTPATIENT
Start: 2023-12-29 | End: 2024-01-02 | Stop reason: HOSPADM

## 2023-12-28 RX ORDER — METOPROLOL SUCCINATE 25 MG/1
12.5 TABLET, EXTENDED RELEASE ORAL DAILY
Status: DISCONTINUED | OUTPATIENT
Start: 2023-12-28 | End: 2024-01-02 | Stop reason: HOSPADM

## 2023-12-28 RX ADMIN — ATORVASTATIN CALCIUM 10 MG: 10 TABLET, FILM COATED ORAL at 19:55

## 2023-12-28 RX ADMIN — ENOXAPARIN SODIUM 40 MG: 100 INJECTION SUBCUTANEOUS at 19:55

## 2023-12-28 RX ADMIN — ASPIRIN 81 MG: 81 TABLET, COATED ORAL at 19:55

## 2023-12-28 RX ADMIN — ISOSORBIDE MONONITRATE 30 MG: 30 TABLET, EXTENDED RELEASE ORAL at 19:55

## 2023-12-28 RX ADMIN — TRAMADOL HYDROCHLORIDE 50 MG: 50 TABLET, COATED ORAL at 14:12

## 2023-12-28 RX ADMIN — SODIUM CHLORIDE, PRESERVATIVE FREE 10 ML: 5 INJECTION INTRAVENOUS at 21:49

## 2023-12-28 RX ADMIN — ACETAMINOPHEN 650 MG: 325 TABLET ORAL at 19:37

## 2023-12-28 RX ADMIN — OXYCODONE 5 MG: 5 TABLET ORAL at 19:37

## 2023-12-28 RX ADMIN — NITROFURANTOIN MONOHYDRATE/MACROCRYSTALS 100 MG: 75; 25 CAPSULE ORAL at 19:55

## 2023-12-28 RX ADMIN — ACETAMINOPHEN 1000 MG: 500 TABLET ORAL at 12:23

## 2023-12-28 RX ADMIN — METOPROLOL SUCCINATE 12.5 MG: 25 TABLET, EXTENDED RELEASE ORAL at 19:55

## 2023-12-28 RX ADMIN — RANOLAZINE 500 MG: 500 TABLET, EXTENDED RELEASE ORAL at 19:55

## 2023-12-28 RX ADMIN — ROPINIROLE HYDROCHLORIDE 4 MG: 2 TABLET, FILM COATED ORAL at 21:48

## 2023-12-28 RX ADMIN — NITROFURANTOIN MONOHYDRATE/MACROCRYSTALS 100 MG: 75; 25 CAPSULE ORAL at 16:00

## 2023-12-28 RX ADMIN — ORPHENADRINE CITRATE 60 MG: 60 INJECTION INTRAMUSCULAR; INTRAVENOUS at 12:43

## 2023-12-28 RX ADMIN — CLOPIDOGREL BISULFATE 75 MG: 75 TABLET ORAL at 19:54

## 2023-12-28 RX ADMIN — PRIMIDONE 50 MG: 50 TABLET ORAL at 21:48

## 2023-12-28 ASSESSMENT — PAIN SCALES - GENERAL
PAINLEVEL_OUTOF10: 7
PAINLEVEL_OUTOF10: 8
PAINLEVEL_OUTOF10: 4
PAINLEVEL_OUTOF10: 8

## 2023-12-28 ASSESSMENT — PAIN DESCRIPTION - LOCATION
LOCATION: HIP
LOCATION: BUTTOCKS

## 2023-12-28 ASSESSMENT — PAIN DESCRIPTION - ORIENTATION
ORIENTATION: LEFT
ORIENTATION: LEFT

## 2023-12-28 ASSESSMENT — PAIN DESCRIPTION - DESCRIPTORS: DESCRIPTORS: ACHING

## 2023-12-28 ASSESSMENT — PAIN - FUNCTIONAL ASSESSMENT: PAIN_FUNCTIONAL_ASSESSMENT: 0-10

## 2023-12-28 NOTE — H&P
Gap 11 9 - 17 mmol/L    Glucose 159 (H) 70 - 99 mg/dL    BUN 17 8 - 23 mg/dL    Creatinine 0.7 0.5 - 0.9 mg/dL    Est, Glom Filt Rate >60 >60 mL/min/1.73m2    Calcium 9.4 8.6 - 10.4 mg/dL    Total Protein 7.2 6.4 - 8.3 g/dL    Albumin 3.9 3.5 - 5.2 g/dL    Total Bilirubin 0.3 0.3 - 1.2 mg/dL    Alkaline Phosphatase 101 35 - 104 U/L    ALT 9 5 - 33 U/L    AST 13 <32 U/L   Sedimentation Rate    Collection Time: 12/28/23 12:39 PM   Result Value Ref Range    Sed Rate 29 0 - 30 mm/Hr   C-Reactive Protein    Collection Time: 12/28/23 12:39 PM   Result Value Ref Range    CRP 4.7 0.0 - 5.0 mg/L   Urinalysis with Reflex to Culture    Collection Time: 12/28/23  2:59 PM    Specimen: Urine, clean catch   Result Value Ref Range    Color, UA Yellow Yellow    Turbidity UA Clear Clear    Glucose, Ur TRACE (A) NEGATIVE mg/dL    Bilirubin Urine NEGATIVE NEGATIVE    Ketones, Urine NEGATIVE NEGATIVE mg/dL    Specific Gravity, UA 1.013 1.000 - 1.030    Urine Hgb SMALL (A) NEGATIVE    pH, UA 7.0 5.0 - 8.0    Protein, UA NEGATIVE NEGATIVE mg/dL    Urobilinogen, Urine Normal 0.0 - 1.0 EU/dL    Nitrite, Urine NEGATIVE NEGATIVE    Leukocyte Esterase, Urine LARGE (A) NEGATIVE   Microscopic Urinalysis    Collection Time: 12/28/23  2:59 PM   Result Value Ref Range    WBC, UA 51  (A) 0 TO 5 /HPF    RBC, UA 0 TO 2 0 TO 2 /HPF    Epithelial Cells UA 0 TO 2 /HPF    Bacteria, UA MANY (A) None       Imaging/Diagnostics:        Assessment :      Primary Problem  UTI (urinary tract infection)    Active Hospital Problems    Diagnosis Date Noted    UTI (urinary tract infection) [N39.0] 12/28/2023       Plan:     Patient status Admit as inpatient in the  Med/Surge  83-year-old male female with multiple comorbidities presented to the ER with worsening hip pain and leg pain and inability to ambulate  CT showed moderate osteoarthritis,  Nonacute  Will consult PT OT  Will likely benefit from placement  CT lumbar  Mild pedal edema of the left leg with

## 2023-12-28 NOTE — ED NOTES
Report given to LETY Sharp from ED.   Report method in person   The following was reviewed with receiving RN:   Current vital signs:  BP (!) 160/69   Pulse 74   Temp 97.9 °F (36.6 °C) (Oral)   Resp 14   Ht 1.448 m (4' 9\")   Wt 72.6 kg (160 lb)   SpO2 100%   BMI 34.62 kg/m²                MEWS Score: 0     Any medication or safety alerts were reviewed. Any pending diagnostics and notifications were also reviewed, as well as any safety concerns or issues, abnormal labs, abnormal imaging, and abnormal assessment findings. Questions were answered.

## 2023-12-28 NOTE — ED NOTES
Pt. Ambulated by Everett MEJIA. Pt. Expressed that she that she's unable to go home d/t pain Dr. Teixeira at bedside to discuss options

## 2023-12-28 NOTE — ED TRIAGE NOTES
Mode of arrival (squad #, walk in, police, etc) : [facility]        Chief complaint(s): Hip pain        Arrival Note (brief scenario, treatment PTA, etc).: Pt brought in by squad from home. Pt reports L hip pain x 2 days. Pt reports the pain starts in her buttocks down her leg. Pt is A&OX4        C= \"Have you ever felt that you should Cut down on your drinking?\"  No  A= \"Have people Annoyed you by criticizing your drinking?\"  No  G= \"Have you ever felt bad or Guilty about your drinking?\"  No  E= \"Have you ever had a drink as an Eye-opener first thing in the morning to steady your nerves or to help a hangover?\"  No      Deferred []      Reason for deferring: N/A    *If yes to two or more: probable alcohol abuse.*

## 2023-12-28 NOTE — ED PROVIDER NOTES
Bed: E02  Expected date:   Expected time:   Means of arrival:   Comments:  Jorden Lopez RN  07/04/20 2034 respiratory distress.      Breath sounds: Normal breath sounds. No stridor. No wheezing or rales.   Abdominal:      Palpations: Abdomen is soft.      Tenderness: There is no abdominal tenderness. There is no guarding or rebound.   Musculoskeletal:         General: No tenderness or deformity. Normal range of motion.      Cervical back: Normal range of motion and neck supple.      Comments: No ctl spine tenderness, full range of motion left hip left knee left ankle.  Full range of motion right hip right knee right ankle.  No deformities.  No crepitus.  No pelvic tenderness.   Skin:     General: Skin is warm and dry.      Capillary Refill: Capillary refill takes less than 2 seconds.      Findings: No erythema or rash.      Comments: Mild bruising left lateral buttocks    Neurological:      General: No focal deficit present.      Mental Status: She is alert and oriented to person, place, and time.      Coordination: Coordination normal.      Comments: Bilateral lower extremity neuro intact motor and sensory function.  Strength 5 out of 5 lower extremities.  GCS 15.   Psychiatric:         Mood and Affect: Mood normal.         Behavior: Behavior normal.         Thought Content: Thought content normal.         Judgment: Judgment normal.         MEDICAL DECISION MAKING / ED COURSE:         1)  Number and Complexity of Problems Addressed at this Encounter  Problem List This Visit: Left hip and buttock pain, for the past 4 days.  Started ever since a prolonged sitting episode in a plastic chair.    Differential Diagnosis: Suspect inflammatory arthritis left hip and strain left gluteus left hip.  There is a mild contusion to her left lateral buttock that I think is probably related to the prolonged sitting in the chair.    Diagnoses Considered but Do Not Suspect: Spinal cord compression, cauda equina, discitis, spinal epidural abscess or hematoma, AAA rupture, aortic dissection, vascular emergency.  Low index suspicion for

## 2023-12-29 ENCOUNTER — HOSPITAL ENCOUNTER (INPATIENT)
Dept: VASCULAR LAB | Age: 83
Discharge: HOME | DRG: 554 | End: 2023-12-31
Attending: INTERNAL MEDICINE
Payer: MEDICARE

## 2023-12-29 ENCOUNTER — APPOINTMENT (OUTPATIENT)
Dept: CT IMAGING | Age: 83
DRG: 554 | End: 2023-12-29
Payer: MEDICARE

## 2023-12-29 LAB
ANION GAP SERPL CALCULATED.3IONS-SCNC: 12 MMOL/L (ref 9–17)
BASOPHILS # BLD: 0.05 K/UL (ref 0–0.2)
BASOPHILS NFR BLD: 1 % (ref 0–2)
BUN SERPL-MCNC: 20 MG/DL (ref 8–23)
CALCIUM SERPL-MCNC: 8.6 MG/DL (ref 8.6–10.4)
CHLORIDE SERPL-SCNC: 106 MMOL/L (ref 98–107)
CO2 SERPL-SCNC: 22 MMOL/L (ref 20–31)
CREAT SERPL-MCNC: 0.7 MG/DL (ref 0.5–0.9)
D DIMER PPP FEU-MCNC: 1.38 UG/ML FEU (ref 0–0.59)
ECHO BSA: 1.71 M2
EOSINOPHIL # BLD: 0.05 K/UL (ref 0–0.4)
EOSINOPHILS RELATIVE PERCENT: 1 % (ref 0–4)
ERYTHROCYTE [DISTWIDTH] IN BLOOD BY AUTOMATED COUNT: 15 % (ref 11.5–14.9)
GFR SERPL CREATININE-BSD FRML MDRD: >60 ML/MIN/1.73M2
GLUCOSE BLD-MCNC: 159 MG/DL (ref 65–105)
GLUCOSE BLD-MCNC: 162 MG/DL (ref 65–105)
GLUCOSE BLD-MCNC: 166 MG/DL (ref 65–105)
GLUCOSE BLD-MCNC: 190 MG/DL (ref 65–105)
GLUCOSE SERPL-MCNC: 208 MG/DL (ref 70–99)
HCT VFR BLD AUTO: 32.9 % (ref 36–46)
HGB BLD-MCNC: 10.4 G/DL (ref 12–16)
LYMPHOCYTES NFR BLD: 1.1 K/UL (ref 1–4.8)
LYMPHOCYTES RELATIVE PERCENT: 22 % (ref 24–44)
MCH RBC QN AUTO: 27.9 PG (ref 26–34)
MCHC RBC AUTO-ENTMCNC: 31.6 G/DL (ref 31–37)
MCV RBC AUTO: 88.3 FL (ref 80–100)
MICROORGANISM SPEC CULT: ABNORMAL
MONOCYTES NFR BLD: 0.35 K/UL (ref 0.1–1.3)
MONOCYTES NFR BLD: 7 % (ref 1–7)
MORPHOLOGY: NORMAL
NEUTROPHILS NFR BLD: 69 % (ref 36–66)
NEUTS SEG NFR BLD: 3.45 K/UL (ref 1.3–9.1)
PLATELET # BLD AUTO: 132 K/UL (ref 150–450)
PMV BLD AUTO: 12.4 FL (ref 6–12)
POTASSIUM SERPL-SCNC: 4.1 MMOL/L (ref 3.7–5.3)
RBC # BLD AUTO: 3.73 M/UL (ref 4–5.2)
SODIUM SERPL-SCNC: 140 MMOL/L (ref 135–144)
SPECIMEN DESCRIPTION: ABNORMAL
WBC OTHER # BLD: 5 K/UL (ref 3.5–11)

## 2023-12-29 PROCEDURE — 82947 ASSAY GLUCOSE BLOOD QUANT: CPT

## 2023-12-29 PROCEDURE — 85379 FIBRIN DEGRADATION QUANT: CPT

## 2023-12-29 PROCEDURE — 1200000000 HC SEMI PRIVATE

## 2023-12-29 PROCEDURE — 93970 EXTREMITY STUDY: CPT | Performed by: SURGERY

## 2023-12-29 PROCEDURE — 85025 COMPLETE CBC W/AUTO DIFF WBC: CPT

## 2023-12-29 PROCEDURE — 80048 BASIC METABOLIC PNL TOTAL CA: CPT

## 2023-12-29 PROCEDURE — 36415 COLL VENOUS BLD VENIPUNCTURE: CPT

## 2023-12-29 PROCEDURE — 6370000000 HC RX 637 (ALT 250 FOR IP): Performed by: INTERNAL MEDICINE

## 2023-12-29 PROCEDURE — 72131 CT LUMBAR SPINE W/O DYE: CPT

## 2023-12-29 PROCEDURE — 6360000002 HC RX W HCPCS: Performed by: INTERNAL MEDICINE

## 2023-12-29 PROCEDURE — 93970 EXTREMITY STUDY: CPT

## 2023-12-29 PROCEDURE — 97166 OT EVAL MOD COMPLEX 45 MIN: CPT

## 2023-12-29 PROCEDURE — 97162 PT EVAL MOD COMPLEX 30 MIN: CPT

## 2023-12-29 PROCEDURE — 99232 SBSQ HOSP IP/OBS MODERATE 35: CPT | Performed by: INTERNAL MEDICINE

## 2023-12-29 PROCEDURE — 2580000003 HC RX 258: Performed by: INTERNAL MEDICINE

## 2023-12-29 PROCEDURE — 6370000000 HC RX 637 (ALT 250 FOR IP): Performed by: NURSE PRACTITIONER

## 2023-12-29 PROCEDURE — 97116 GAIT TRAINING THERAPY: CPT

## 2023-12-29 PROCEDURE — 97530 THERAPEUTIC ACTIVITIES: CPT

## 2023-12-29 RX ORDER — LIDOCAINE 4 G/G
1 PATCH TOPICAL DAILY
Status: DISCONTINUED | OUTPATIENT
Start: 2023-12-29 | End: 2024-01-02 | Stop reason: HOSPADM

## 2023-12-29 RX ADMIN — SODIUM CHLORIDE, PRESERVATIVE FREE 10 ML: 5 INJECTION INTRAVENOUS at 22:33

## 2023-12-29 RX ADMIN — ACETAMINOPHEN 650 MG: 325 TABLET ORAL at 04:24

## 2023-12-29 RX ADMIN — NITROFURANTOIN MONOHYDRATE/MACROCRYSTALS 100 MG: 75; 25 CAPSULE ORAL at 22:34

## 2023-12-29 RX ADMIN — OXYCODONE 5 MG: 5 TABLET ORAL at 20:54

## 2023-12-29 RX ADMIN — AMLODIPINE BESYLATE 2.5 MG: 2.5 TABLET ORAL at 09:42

## 2023-12-29 RX ADMIN — ENOXAPARIN SODIUM 40 MG: 100 INJECTION SUBCUTANEOUS at 22:33

## 2023-12-29 RX ADMIN — ROPINIROLE HYDROCHLORIDE 4 MG: 2 TABLET, FILM COATED ORAL at 22:34

## 2023-12-29 RX ADMIN — OXYCODONE 5 MG: 5 TABLET ORAL at 04:24

## 2023-12-29 RX ADMIN — OXYCODONE 5 MG: 5 TABLET ORAL at 14:22

## 2023-12-29 RX ADMIN — ISOSORBIDE MONONITRATE 30 MG: 30 TABLET, EXTENDED RELEASE ORAL at 09:42

## 2023-12-29 RX ADMIN — OXYCODONE 5 MG: 5 TABLET ORAL at 09:30

## 2023-12-29 RX ADMIN — NITROFURANTOIN MONOHYDRATE/MACROCRYSTALS 100 MG: 75; 25 CAPSULE ORAL at 09:30

## 2023-12-29 RX ADMIN — PRIMIDONE 50 MG: 50 TABLET ORAL at 11:31

## 2023-12-29 RX ADMIN — CYANOCOBALAMIN TAB 1000 MCG 1000 MCG: 1000 TAB at 09:30

## 2023-12-29 RX ADMIN — ASPIRIN 81 MG: 81 TABLET, COATED ORAL at 09:30

## 2023-12-29 RX ADMIN — SODIUM CHLORIDE, PRESERVATIVE FREE 10 ML: 5 INJECTION INTRAVENOUS at 09:36

## 2023-12-29 RX ADMIN — PRIMIDONE 50 MG: 50 TABLET ORAL at 22:35

## 2023-12-29 RX ADMIN — PANTOPRAZOLE SODIUM 40 MG: 40 TABLET, DELAYED RELEASE ORAL at 06:06

## 2023-12-29 RX ADMIN — RANOLAZINE 500 MG: 500 TABLET, EXTENDED RELEASE ORAL at 09:30

## 2023-12-29 RX ADMIN — ATORVASTATIN CALCIUM 10 MG: 10 TABLET, FILM COATED ORAL at 22:33

## 2023-12-29 RX ADMIN — CLOPIDOGREL BISULFATE 75 MG: 75 TABLET ORAL at 09:30

## 2023-12-29 RX ADMIN — OXYCODONE 5 MG: 5 TABLET ORAL at 00:56

## 2023-12-29 RX ADMIN — RANOLAZINE 500 MG: 500 TABLET, EXTENDED RELEASE ORAL at 22:33

## 2023-12-29 ASSESSMENT — PAIN DESCRIPTION - DESCRIPTORS
DESCRIPTORS: ACHING;SHOOTING;STABBING
DESCRIPTORS: DULL;SHARP

## 2023-12-29 ASSESSMENT — PAIN DESCRIPTION - LOCATION
LOCATION: HIP
LOCATION: HIP;LEG
LOCATION: HIP
LOCATION: NECK

## 2023-12-29 ASSESSMENT — PAIN DESCRIPTION - ORIENTATION
ORIENTATION: LEFT

## 2023-12-29 ASSESSMENT — PAIN SCALES - GENERAL
PAINLEVEL_OUTOF10: 10
PAINLEVEL_OUTOF10: 6
PAINLEVEL_OUTOF10: 9
PAINLEVEL_OUTOF10: 8

## 2023-12-29 NOTE — CARE COORDINATION
Case Management Assessment  Initial Evaluation    Date/Time of Evaluation: 12/29/2023 5:12 PM  Assessment Completed by: Rosmery Muro RN    If patient is discharged prior to next notation, then this note serves as note for discharge by case management.    Patient Name: Komal Crawford                   YOB: 1940  Diagnosis: UTI (urinary tract infection) [N39.0]  Acute cystitis without hematuria [N30.00]  Contusion of left hip, initial encounter [S70.02XA]  Hip strain, left, initial encounter [S76.012A]                   Date / Time: 12/28/2023 11:28 AM    Patient Admission Status: Inpatient   Readmission Risk (Low < 19, Mod (19-27), High > 27): Readmission Risk Score: 14.6    Current PCP: Sabiha Bedolla MD  PCP verified by CM? Yes    Chart Reviewed: Yes      History Provided by: Patient, Spouse  Patient Orientation: Alert and Oriented    Patient Cognition: Alert    Hospitalization in the last 30 days (Readmission):  No    If yes, Readmission Assessment in  Navigator will be completed.    Advance Directives:      Code Status: Full Code   Patient's Primary Decision Maker is: Legal Next of Kin    Primary Decision Maker: Ritchie Crawford - Spouse - [phone]    Discharge Planning:    Patient lives with: Spouse/Significant Other Type of Home: Trailer/Mobile Home  Primary Care Giver: Self  Patient Support Systems include: Family Members, Spouse/Significant Other   Current Financial resources:    Current community resources: ECF/Home Care  Current services prior to admission: None            Current DME:              Type of Home Care services:  PT, OT, Nursing Services, Skilled Therapy    ADLS  Prior functional level: Independent in ADLs/IADLs  Current functional level: Assistance with the following:, Toileting, Dressing, Bathing, Feeding, Cooking, Housework, Shopping, Mobility    PT AM-PAC: 15 /24  OT AM-PAC: 15 /24    Family can provide assistance at DC: Other (comment) (to a certain point, [place]

## 2023-12-29 NOTE — CARE COORDINATION
Writer notified Dr Melendez that a PM& R consult is needed     Electronically signed by Rosmery Muro RN on 12/29/2023 at 5:17 PM

## 2023-12-30 ENCOUNTER — APPOINTMENT (OUTPATIENT)
Dept: CT IMAGING | Age: 83
DRG: 554 | End: 2023-12-30
Payer: MEDICARE

## 2023-12-30 ENCOUNTER — APPOINTMENT (OUTPATIENT)
Dept: MRI IMAGING | Age: 83
DRG: 554 | End: 2023-12-30
Payer: MEDICARE

## 2023-12-30 LAB
GLUCOSE BLD-MCNC: 156 MG/DL (ref 65–105)
GLUCOSE BLD-MCNC: 201 MG/DL (ref 65–105)
GLUCOSE BLD-MCNC: 203 MG/DL (ref 65–105)
GLUCOSE BLD-MCNC: 224 MG/DL (ref 65–105)

## 2023-12-30 PROCEDURE — 70450 CT HEAD/BRAIN W/O DYE: CPT

## 2023-12-30 PROCEDURE — 97116 GAIT TRAINING THERAPY: CPT

## 2023-12-30 PROCEDURE — 70551 MRI BRAIN STEM W/O DYE: CPT

## 2023-12-30 PROCEDURE — 6370000000 HC RX 637 (ALT 250 FOR IP): Performed by: INTERNAL MEDICINE

## 2023-12-30 PROCEDURE — 82947 ASSAY GLUCOSE BLOOD QUANT: CPT

## 2023-12-30 PROCEDURE — 2580000003 HC RX 258: Performed by: INTERNAL MEDICINE

## 2023-12-30 PROCEDURE — 99233 SBSQ HOSP IP/OBS HIGH 50: CPT | Performed by: INTERNAL MEDICINE

## 2023-12-30 PROCEDURE — 6360000002 HC RX W HCPCS: Performed by: INTERNAL MEDICINE

## 2023-12-30 PROCEDURE — 6370000000 HC RX 637 (ALT 250 FOR IP): Performed by: NURSE PRACTITIONER

## 2023-12-30 PROCEDURE — 1200000000 HC SEMI PRIVATE

## 2023-12-30 RX ORDER — AMLODIPINE BESYLATE 5 MG/1
5 TABLET ORAL EVERY MORNING
Status: DISCONTINUED | OUTPATIENT
Start: 2023-12-31 | End: 2024-01-02 | Stop reason: HOSPADM

## 2023-12-30 RX ADMIN — NITROFURANTOIN MONOHYDRATE/MACROCRYSTALS 100 MG: 75; 25 CAPSULE ORAL at 22:02

## 2023-12-30 RX ADMIN — ATORVASTATIN CALCIUM 10 MG: 10 TABLET, FILM COATED ORAL at 22:02

## 2023-12-30 RX ADMIN — SODIUM CHLORIDE, PRESERVATIVE FREE 10 ML: 5 INJECTION INTRAVENOUS at 08:05

## 2023-12-30 RX ADMIN — AMLODIPINE BESYLATE 2.5 MG: 2.5 TABLET ORAL at 08:05

## 2023-12-30 RX ADMIN — PANTOPRAZOLE SODIUM 40 MG: 40 TABLET, DELAYED RELEASE ORAL at 06:17

## 2023-12-30 RX ADMIN — RANOLAZINE 500 MG: 500 TABLET, EXTENDED RELEASE ORAL at 22:02

## 2023-12-30 RX ADMIN — ISOSORBIDE MONONITRATE 30 MG: 30 TABLET, EXTENDED RELEASE ORAL at 08:05

## 2023-12-30 RX ADMIN — PRIMIDONE 50 MG: 50 TABLET ORAL at 22:06

## 2023-12-30 RX ADMIN — METOPROLOL SUCCINATE 12.5 MG: 25 TABLET, EXTENDED RELEASE ORAL at 08:04

## 2023-12-30 RX ADMIN — ACETAMINOPHEN 650 MG: 325 TABLET ORAL at 22:02

## 2023-12-30 RX ADMIN — INSULIN LISPRO 2 UNITS: 100 INJECTION, SOLUTION INTRAVENOUS; SUBCUTANEOUS at 18:29

## 2023-12-30 RX ADMIN — ROPINIROLE HYDROCHLORIDE 4 MG: 2 TABLET, FILM COATED ORAL at 22:06

## 2023-12-30 RX ADMIN — ENOXAPARIN SODIUM 40 MG: 100 INJECTION SUBCUTANEOUS at 22:02

## 2023-12-30 RX ADMIN — OXYCODONE 5 MG: 5 TABLET ORAL at 01:31

## 2023-12-30 RX ADMIN — SODIUM CHLORIDE, PRESERVATIVE FREE 10 ML: 5 INJECTION INTRAVENOUS at 22:11

## 2023-12-30 RX ADMIN — RANOLAZINE 500 MG: 500 TABLET, EXTENDED RELEASE ORAL at 08:08

## 2023-12-30 RX ADMIN — ASPIRIN 81 MG: 81 TABLET, COATED ORAL at 08:05

## 2023-12-30 RX ADMIN — OXYCODONE 5 MG: 5 TABLET ORAL at 08:08

## 2023-12-30 RX ADMIN — NITROFURANTOIN MONOHYDRATE/MACROCRYSTALS 100 MG: 75; 25 CAPSULE ORAL at 08:08

## 2023-12-30 RX ADMIN — ACETAMINOPHEN 650 MG: 325 TABLET ORAL at 05:50

## 2023-12-30 RX ADMIN — POLYETHYLENE GLYCOL 3350 17 G: 17 POWDER, FOR SOLUTION ORAL at 08:09

## 2023-12-30 RX ADMIN — CLOPIDOGREL BISULFATE 75 MG: 75 TABLET ORAL at 08:05

## 2023-12-30 RX ADMIN — PRIMIDONE 50 MG: 50 TABLET ORAL at 10:57

## 2023-12-30 ASSESSMENT — PAIN DESCRIPTION - DESCRIPTORS
DESCRIPTORS: SHARP
DESCRIPTORS: BURNING;ACHING
DESCRIPTORS: SHARP
DESCRIPTORS: ACHING;SHOOTING

## 2023-12-30 ASSESSMENT — PAIN SCALES - GENERAL
PAINLEVEL_OUTOF10: 8
PAINLEVEL_OUTOF10: 6
PAINLEVEL_OUTOF10: 10
PAINLEVEL_OUTOF10: 10

## 2023-12-30 ASSESSMENT — PAIN DESCRIPTION - LOCATION
LOCATION: HIP
LOCATION: HIP
LOCATION: HIP;BACK
LOCATION: HIP

## 2023-12-30 ASSESSMENT — PAIN DESCRIPTION - ORIENTATION
ORIENTATION: LEFT

## 2023-12-30 NOTE — PLAN OF CARE
Problem: Discharge Planning  Goal: Discharge to home or other facility with appropriate resources  12/30/2023 0418 by Merle Sharma, RN  Outcome: Progressing  Flowsheets (Taken 12/30/2023 0418)  Discharge to home or other facility with appropriate resources:   Identify barriers to discharge with patient and caregiver   Identify discharge learning needs (meds, wound care, etc)   Refer to discharge planning if patient needs post-hospital services based on physician order or complex needs related to functional status, cognitive ability or social support system   Arrange for needed discharge resources and transportation as appropriate  Note: Inform pt. Of discharge teaching and planned. Instructed pt. To inform me if further teaching need to be done.      Problem: Pain  Goal: Verbalizes/displays adequate comfort level or baseline comfort level  12/30/2023 0418 by Merle Sharma, RN  Outcome: Progressing  Flowsheets (Taken 12/30/2023 0418)  Verbalizes/displays adequate comfort level or baseline comfort level:   Encourage patient to monitor pain and request assistance   Administer analgesics based on type and severity of pain and evaluate response   Consider cultural and social influences on pain and pain management   Assess pain using appropriate pain scale   Implement non-pharmacological measures as appropriate and evaluate response   Notify Licensed Independent Practitioner if interventions unsuccessful or patient reports new pain  Note: PT. Received pain meds in timely manner. Teach pt. Non-pharm. Methods to handle pain.      Problem: Safety - Adult  Goal: Free from fall injury  12/30/2023 0418 by Merle Sharma, RN  Outcome: Progressing  Flowsheets (Taken 12/30/2023 0418)  Free From Fall Injury:   Instruct family/caregiver on patient safety   Based on caregiver fall risk screen, instruct family/caregiver to ask for assistance with transferring infant if caregiver noted to have fall risk factors  Note: Made

## 2023-12-30 NOTE — PLAN OF CARE
Problem: Discharge Planning  Goal: Discharge to home or other facility with appropriate resources  Outcome: Progressing     Problem: Pain  Goal: Verbalizes/displays adequate comfort level or baseline comfort level  Outcome: Progressing     Problem: Safety - Adult  Goal: Free from fall injury  Outcome: Progressing     Problem: Skin/Tissue Integrity  Goal: Absence of new skin breakdown  Description: 1.  Monitor for areas of redness and/or skin breakdown  2.  Assess vascular access sites hourly  3.  Every 4-6 hours minimum:  Change oxygen saturation probe site  4.  Every 4-6 hours:  If on nasal continuous positive airway pressure, respiratory therapy assess nares and determine need for appliance change or resting period.  Outcome: Progressing

## 2023-12-30 NOTE — PLAN OF CARE
Please have patient or POA answer all MRI Screening questions in Epic. Exam will be scheduled after form has been completed and reviewed. Thank you.

## 2023-12-31 LAB
GLUCOSE BLD-MCNC: 154 MG/DL (ref 65–105)
GLUCOSE BLD-MCNC: 174 MG/DL (ref 65–105)
GLUCOSE BLD-MCNC: 221 MG/DL (ref 65–105)
GLUCOSE BLD-MCNC: 310 MG/DL (ref 65–105)

## 2023-12-31 PROCEDURE — 2580000003 HC RX 258: Performed by: INTERNAL MEDICINE

## 2023-12-31 PROCEDURE — 97116 GAIT TRAINING THERAPY: CPT

## 2023-12-31 PROCEDURE — 6370000000 HC RX 637 (ALT 250 FOR IP): Performed by: INTERNAL MEDICINE

## 2023-12-31 PROCEDURE — 6360000002 HC RX W HCPCS: Performed by: INTERNAL MEDICINE

## 2023-12-31 PROCEDURE — 1200000000 HC SEMI PRIVATE

## 2023-12-31 PROCEDURE — 82947 ASSAY GLUCOSE BLOOD QUANT: CPT

## 2023-12-31 PROCEDURE — 97110 THERAPEUTIC EXERCISES: CPT

## 2023-12-31 PROCEDURE — 99233 SBSQ HOSP IP/OBS HIGH 50: CPT | Performed by: INTERNAL MEDICINE

## 2023-12-31 PROCEDURE — 6370000000 HC RX 637 (ALT 250 FOR IP): Performed by: NURSE PRACTITIONER

## 2023-12-31 RX ORDER — PREDNISONE 20 MG/1
40 TABLET ORAL DAILY
Status: DISCONTINUED | OUTPATIENT
Start: 2023-12-31 | End: 2024-01-02

## 2023-12-31 RX ADMIN — ATORVASTATIN CALCIUM 10 MG: 10 TABLET, FILM COATED ORAL at 20:26

## 2023-12-31 RX ADMIN — AMLODIPINE BESYLATE 5 MG: 5 TABLET ORAL at 08:36

## 2023-12-31 RX ADMIN — ACETAMINOPHEN 650 MG: 325 TABLET ORAL at 06:27

## 2023-12-31 RX ADMIN — NITROFURANTOIN MONOHYDRATE/MACROCRYSTALS 100 MG: 75; 25 CAPSULE ORAL at 20:26

## 2023-12-31 RX ADMIN — CLOPIDOGREL BISULFATE 75 MG: 75 TABLET ORAL at 08:36

## 2023-12-31 RX ADMIN — PANTOPRAZOLE SODIUM 40 MG: 40 TABLET, DELAYED RELEASE ORAL at 06:27

## 2023-12-31 RX ADMIN — NITROFURANTOIN MONOHYDRATE/MACROCRYSTALS 100 MG: 75; 25 CAPSULE ORAL at 08:39

## 2023-12-31 RX ADMIN — INSULIN LISPRO 4 UNITS: 100 INJECTION, SOLUTION INTRAVENOUS; SUBCUTANEOUS at 20:31

## 2023-12-31 RX ADMIN — METOPROLOL SUCCINATE 12.5 MG: 25 TABLET, EXTENDED RELEASE ORAL at 08:36

## 2023-12-31 RX ADMIN — PRIMIDONE 50 MG: 50 TABLET ORAL at 08:38

## 2023-12-31 RX ADMIN — PREDNISONE 40 MG: 20 TABLET ORAL at 10:39

## 2023-12-31 RX ADMIN — RANOLAZINE 500 MG: 500 TABLET, EXTENDED RELEASE ORAL at 20:26

## 2023-12-31 RX ADMIN — ASPIRIN 81 MG: 81 TABLET, COATED ORAL at 08:36

## 2023-12-31 RX ADMIN — INSULIN LISPRO 2 UNITS: 100 INJECTION, SOLUTION INTRAVENOUS; SUBCUTANEOUS at 17:00

## 2023-12-31 RX ADMIN — OXYCODONE 5 MG: 5 TABLET ORAL at 00:09

## 2023-12-31 RX ADMIN — ENOXAPARIN SODIUM 40 MG: 100 INJECTION SUBCUTANEOUS at 20:26

## 2023-12-31 RX ADMIN — SODIUM CHLORIDE, PRESERVATIVE FREE 10 ML: 5 INJECTION INTRAVENOUS at 20:26

## 2023-12-31 RX ADMIN — PRIMIDONE 50 MG: 50 TABLET ORAL at 20:27

## 2023-12-31 RX ADMIN — ROPINIROLE HYDROCHLORIDE 4 MG: 2 TABLET, FILM COATED ORAL at 20:48

## 2023-12-31 RX ADMIN — RANOLAZINE 500 MG: 500 TABLET, EXTENDED RELEASE ORAL at 08:39

## 2023-12-31 RX ADMIN — OXYCODONE 5 MG: 5 TABLET ORAL at 08:36

## 2023-12-31 RX ADMIN — ISOSORBIDE MONONITRATE 30 MG: 30 TABLET, EXTENDED RELEASE ORAL at 08:36

## 2023-12-31 RX ADMIN — SODIUM CHLORIDE, PRESERVATIVE FREE 10 ML: 5 INJECTION INTRAVENOUS at 08:37

## 2023-12-31 RX ADMIN — POLYETHYLENE GLYCOL 3350 17 G: 17 POWDER, FOR SOLUTION ORAL at 08:49

## 2023-12-31 ASSESSMENT — PAIN DESCRIPTION - LOCATION
LOCATION: HIP
LOCATION: HIP;LEG

## 2023-12-31 ASSESSMENT — PAIN SCALES - GENERAL
PAINLEVEL_OUTOF10: 10
PAINLEVEL_OUTOF10: 8

## 2023-12-31 ASSESSMENT — PAIN DESCRIPTION - DESCRIPTORS: DESCRIPTORS: CRAMPING

## 2023-12-31 ASSESSMENT — PAIN DESCRIPTION - ORIENTATION
ORIENTATION: LEFT
ORIENTATION: LEFT

## 2023-12-31 NOTE — PLAN OF CARE
Problem: Discharge Planning  Goal: Discharge to home or other facility with appropriate resources  12/31/2023 0427 by Merle Sharma, RN  Outcome: Progressing  Flowsheets (Taken 12/31/2023 0427)  Discharge to home or other facility with appropriate resources:   Identify barriers to discharge with patient and caregiver   Identify discharge learning needs (meds, wound care, etc)   Refer to discharge planning if patient needs post-hospital services based on physician order or complex needs related to functional status, cognitive ability or social support system   Arrange for needed discharge resources and transportation as appropriate  Note: Inform pt. Of discharge teaching and planned. Instructed pt. To inform me if further teaching need to be done.      Problem: Pain  Goal: Verbalizes/displays adequate comfort level or baseline comfort level  12/31/2023 0427 by Merle Sharma, RN  Outcome: Progressing  Flowsheets (Taken 12/31/2023 0427)  Verbalizes/displays adequate comfort level or baseline comfort level:   Encourage patient to monitor pain and request assistance   Consider cultural and social influences on pain and pain management   Administer analgesics based on type and severity of pain and evaluate response   Assess pain using appropriate pain scale   Notify Licensed Independent Practitioner if interventions unsuccessful or patient reports new pain   Implement non-pharmacological measures as appropriate and evaluate response  Note: PT. Received pain meds in timely manner. Teach pt. Non-pharm. Methods to handle pain.      Problem: Safety - Adult  Goal: Free from fall injury  12/31/2023 0427 by Merle Sharma, RN  Outcome: Progressing  Flowsheets (Taken 12/31/2023 0427)  Free From Fall Injury:   Instruct family/caregiver on patient safety   Based on caregiver fall risk screen, instruct family/caregiver to ask for assistance with transferring infant if caregiver noted to have fall risk factors  Note: Made

## 2023-12-31 NOTE — PLAN OF CARE
Problem: Discharge Planning  Goal: Discharge to home or other facility with appropriate resources  12/31/2023 1711 by Maame Calderon, RN  Outcome: Progressing     Problem: Pain  Goal: Verbalizes/displays adequate comfort level or baseline comfort level  12/31/2023 1711 by Maame Calderon, RN  Outcome: Progressing     Problem: Skin/Tissue Integrity  Goal: Absence of new skin breakdown  Description: 1.  Monitor for areas of redness and/or skin breakdown  2.  Assess vascular access sites hourly  3.  Every 4-6 hours minimum:  Change oxygen saturation probe site  4.  Every 4-6 hours:  If on nasal continuous positive airway pressure, respiratory therapy assess nares and determine need for appliance change or resting period.  Outcome: Progressing

## 2024-01-01 LAB
GLUCOSE BLD-MCNC: 202 MG/DL (ref 65–105)
GLUCOSE BLD-MCNC: 251 MG/DL (ref 65–105)
GLUCOSE BLD-MCNC: 290 MG/DL (ref 65–105)
GLUCOSE BLD-MCNC: 294 MG/DL (ref 65–105)
GLUCOSE BLD-MCNC: 348 MG/DL (ref 65–105)

## 2024-01-01 PROCEDURE — 82947 ASSAY GLUCOSE BLOOD QUANT: CPT

## 2024-01-01 PROCEDURE — 1200000000 HC SEMI PRIVATE

## 2024-01-01 PROCEDURE — 2580000003 HC RX 258: Performed by: INTERNAL MEDICINE

## 2024-01-01 PROCEDURE — 99232 SBSQ HOSP IP/OBS MODERATE 35: CPT | Performed by: INTERNAL MEDICINE

## 2024-01-01 PROCEDURE — 6370000000 HC RX 637 (ALT 250 FOR IP): Performed by: NURSE PRACTITIONER

## 2024-01-01 PROCEDURE — 6370000000 HC RX 637 (ALT 250 FOR IP): Performed by: INTERNAL MEDICINE

## 2024-01-01 PROCEDURE — 6360000002 HC RX W HCPCS: Performed by: INTERNAL MEDICINE

## 2024-01-01 RX ORDER — BISACODYL 10 MG
10 SUPPOSITORY, RECTAL RECTAL DAILY PRN
Status: DISCONTINUED | OUTPATIENT
Start: 2024-01-01 | End: 2024-01-02 | Stop reason: HOSPADM

## 2024-01-01 RX ADMIN — AMLODIPINE BESYLATE 5 MG: 5 TABLET ORAL at 08:29

## 2024-01-01 RX ADMIN — INSULIN LISPRO 4 UNITS: 100 INJECTION, SOLUTION INTRAVENOUS; SUBCUTANEOUS at 17:05

## 2024-01-01 RX ADMIN — METOPROLOL SUCCINATE 12.5 MG: 25 TABLET, EXTENDED RELEASE ORAL at 08:29

## 2024-01-01 RX ADMIN — ENOXAPARIN SODIUM 40 MG: 100 INJECTION SUBCUTANEOUS at 20:22

## 2024-01-01 RX ADMIN — CLOPIDOGREL BISULFATE 75 MG: 75 TABLET ORAL at 08:30

## 2024-01-01 RX ADMIN — POLYETHYLENE GLYCOL 3350 17 G: 17 POWDER, FOR SOLUTION ORAL at 11:16

## 2024-01-01 RX ADMIN — ASPIRIN 81 MG: 81 TABLET, COATED ORAL at 08:30

## 2024-01-01 RX ADMIN — PRIMIDONE 50 MG: 50 TABLET ORAL at 20:16

## 2024-01-01 RX ADMIN — ISOSORBIDE MONONITRATE 30 MG: 30 TABLET, EXTENDED RELEASE ORAL at 08:30

## 2024-01-01 RX ADMIN — PREDNISONE 40 MG: 20 TABLET ORAL at 08:30

## 2024-01-01 RX ADMIN — RANOLAZINE 500 MG: 500 TABLET, EXTENDED RELEASE ORAL at 20:16

## 2024-01-01 RX ADMIN — ROPINIROLE HYDROCHLORIDE 4 MG: 2 TABLET, FILM COATED ORAL at 20:16

## 2024-01-01 RX ADMIN — PANTOPRAZOLE SODIUM 40 MG: 40 TABLET, DELAYED RELEASE ORAL at 05:12

## 2024-01-01 RX ADMIN — NITROFURANTOIN MONOHYDRATE/MACROCRYSTALS 100 MG: 75; 25 CAPSULE ORAL at 20:16

## 2024-01-01 RX ADMIN — POLYETHYLENE GLYCOL 3350 17 G: 17 POWDER, FOR SOLUTION ORAL at 05:14

## 2024-01-01 RX ADMIN — BISACODYL 10 MG: 10 SUPPOSITORY RECTAL at 11:16

## 2024-01-01 RX ADMIN — ATORVASTATIN CALCIUM 10 MG: 10 TABLET, FILM COATED ORAL at 20:16

## 2024-01-01 RX ADMIN — RANOLAZINE 500 MG: 500 TABLET, EXTENDED RELEASE ORAL at 08:29

## 2024-01-01 RX ADMIN — INSULIN LISPRO 2 UNITS: 100 INJECTION, SOLUTION INTRAVENOUS; SUBCUTANEOUS at 08:29

## 2024-01-01 RX ADMIN — SODIUM CHLORIDE, PRESERVATIVE FREE 10 ML: 5 INJECTION INTRAVENOUS at 08:30

## 2024-01-01 RX ADMIN — INSULIN LISPRO 4 UNITS: 100 INJECTION, SOLUTION INTRAVENOUS; SUBCUTANEOUS at 12:00

## 2024-01-01 RX ADMIN — SODIUM CHLORIDE, PRESERVATIVE FREE 10 ML: 5 INJECTION INTRAVENOUS at 20:30

## 2024-01-01 RX ADMIN — INSULIN LISPRO 4 UNITS: 100 INJECTION, SOLUTION INTRAVENOUS; SUBCUTANEOUS at 20:16

## 2024-01-01 RX ADMIN — NITROFURANTOIN MONOHYDRATE/MACROCRYSTALS 100 MG: 75; 25 CAPSULE ORAL at 08:30

## 2024-01-01 RX ADMIN — PRIMIDONE 50 MG: 50 TABLET ORAL at 08:35

## 2024-01-01 RX ADMIN — ACETAMINOPHEN 650 MG: 325 TABLET ORAL at 05:12

## 2024-01-01 ASSESSMENT — PAIN DESCRIPTION - LOCATION: LOCATION: HIP;LEG

## 2024-01-01 ASSESSMENT — PAIN SCALES - GENERAL: PAINLEVEL_OUTOF10: 3

## 2024-01-01 ASSESSMENT — PAIN DESCRIPTION - DESCRIPTORS: DESCRIPTORS: SHARP

## 2024-01-01 ASSESSMENT — PAIN DESCRIPTION - ORIENTATION: ORIENTATION: LEFT

## 2024-01-01 NOTE — CARE COORDINATION
ONGOING DISCHARGE PLAN:    Patient is alert and oriented x4.    Spoke with patient regarding discharge plan and patient confirms that plan is still [facility]. They can take tomorrow per Sharmaine.    [facility] thru 1/2    PT/OT    Will continue to follow for additional discharge needs.    If patient is discharged prior to next notation, then this note serves as note for discharge by case management.    Electronically signed by Charu Agee RN on 1/1/2024 at 10:50 AM

## 2024-01-01 NOTE — DISCHARGE INSTR - COC
diagnosis listed and that she requires {Admit to Appropriate Level of Care:61446} for {GREATER/LESS:890853299} 30 days.     Update Admission H&P: {CHP DME Changes in HandP:173522393}    PHYSICIAN SIGNATURE:  {Esignature:[id number]}

## 2024-01-02 ENCOUNTER — APPOINTMENT (OUTPATIENT)
Dept: NUCLEAR MEDICINE | Age: 84
DRG: 554 | End: 2024-01-02
Payer: MEDICARE

## 2024-01-02 ENCOUNTER — HOSPITAL ENCOUNTER (OUTPATIENT)
Dept: PHYSICAL THERAPY | Age: 84
Setting detail: THERAPIES SERIES
Discharge: HOME | End: 2024-01-02

## 2024-01-02 ENCOUNTER — APPOINTMENT (OUTPATIENT)
Dept: GENERAL RADIOLOGY | Age: 84
DRG: 554 | End: 2024-01-02
Payer: MEDICARE

## 2024-01-02 ENCOUNTER — HOSPITAL ENCOUNTER (INPATIENT)
Age: 84
LOS: 7 days | Discharge: HOME | DRG: 554 | End: 2024-01-09
Attending: PHYSICAL MEDICINE & REHABILITATION | Admitting: PHYSICAL MEDICINE & REHABILITATION
Payer: MEDICARE

## 2024-01-02 VITALS
TEMPERATURE: 98.1 F | RESPIRATION RATE: 16 BRPM | WEIGHT: 160 LBS | DIASTOLIC BLOOD PRESSURE: 70 MMHG | HEART RATE: 58 BPM | OXYGEN SATURATION: 97 % | HEIGHT: 57 IN | SYSTOLIC BLOOD PRESSURE: 156 MMHG | BODY MASS INDEX: 34.52 KG/M2

## 2024-01-02 DIAGNOSIS — S83.412A SPRAIN OF MEDIAL COLLATERAL LIGAMENT OF LEFT KNEE, INITIAL ENCOUNTER: ICD-10-CM

## 2024-01-02 DIAGNOSIS — R53.81 DEBILITY: ICD-10-CM

## 2024-01-02 DIAGNOSIS — M16.12 PRIMARY OSTEOARTHRITIS OF LEFT HIP: Primary | ICD-10-CM

## 2024-01-02 LAB
GLUCOSE BLD-MCNC: 149 MG/DL (ref 65–105)
GLUCOSE BLD-MCNC: 176 MG/DL (ref 65–105)
GLUCOSE BLD-MCNC: 177 MG/DL (ref 65–105)
GLUCOSE BLD-MCNC: 287 MG/DL (ref 65–105)

## 2024-01-02 PROCEDURE — 6360000002 HC RX W HCPCS: Performed by: INTERNAL MEDICINE

## 2024-01-02 PROCEDURE — 97530 THERAPEUTIC ACTIVITIES: CPT

## 2024-01-02 PROCEDURE — 6370000000 HC RX 637 (ALT 250 FOR IP): Performed by: NURSE PRACTITIONER

## 2024-01-02 PROCEDURE — 78582 LUNG VENTILAT&PERFUS IMAGING: CPT

## 2024-01-02 PROCEDURE — 97110 THERAPEUTIC EXERCISES: CPT

## 2024-01-02 PROCEDURE — 2580000003 HC RX 258: Performed by: INTERNAL MEDICINE

## 2024-01-02 PROCEDURE — 6370000000 HC RX 637 (ALT 250 FOR IP): Performed by: INTERNAL MEDICINE

## 2024-01-02 PROCEDURE — 97116 GAIT TRAINING THERAPY: CPT

## 2024-01-02 PROCEDURE — 99222 1ST HOSP IP/OBS MODERATE 55: CPT | Performed by: PHYSICAL MEDICINE & REHABILITATION

## 2024-01-02 PROCEDURE — 1180000000 HC REHAB R&B

## 2024-01-02 PROCEDURE — 82947 ASSAY GLUCOSE BLOOD QUANT: CPT

## 2024-01-02 PROCEDURE — A9539 TC99M PENTETATE: HCPCS | Performed by: INTERNAL MEDICINE

## 2024-01-02 PROCEDURE — 3430000000 HC RX DIAGNOSTIC RADIOPHARMACEUTICAL: Performed by: INTERNAL MEDICINE

## 2024-01-02 PROCEDURE — 97535 SELF CARE MNGMENT TRAINING: CPT

## 2024-01-02 PROCEDURE — 99239 HOSP IP/OBS DSCHRG MGMT >30: CPT | Performed by: INTERNAL MEDICINE

## 2024-01-02 PROCEDURE — A9540 TC99M MAA: HCPCS | Performed by: INTERNAL MEDICINE

## 2024-01-02 PROCEDURE — 71046 X-RAY EXAM CHEST 2 VIEWS: CPT

## 2024-01-02 RX ORDER — POTASSIUM CHLORIDE 7.45 MG/ML
10 INJECTION INTRAVENOUS PRN
Status: DISCONTINUED | OUTPATIENT
Start: 2024-01-02 | End: 2024-01-03

## 2024-01-02 RX ORDER — SENNOSIDES A AND B 8.6 MG/1
2 TABLET, FILM COATED ORAL DAILY PRN
Status: CANCELLED | OUTPATIENT
Start: 2024-01-02

## 2024-01-02 RX ORDER — SODIUM CHLORIDE 0.9 % (FLUSH) 0.9 %
5-40 SYRINGE (ML) INJECTION EVERY 12 HOURS SCHEDULED
Status: CANCELLED | OUTPATIENT
Start: 2024-01-02

## 2024-01-02 RX ORDER — SODIUM CHLORIDE 9 MG/ML
INJECTION, SOLUTION INTRAVENOUS PRN
Status: CANCELLED | OUTPATIENT
Start: 2024-01-02

## 2024-01-02 RX ORDER — ONDANSETRON 4 MG/1
4 TABLET, ORALLY DISINTEGRATING ORAL EVERY 8 HOURS PRN
Status: CANCELLED | OUTPATIENT
Start: 2024-01-02

## 2024-01-02 RX ORDER — SODIUM CHLORIDE 0.9 % (FLUSH) 0.9 %
10 SYRINGE (ML) INJECTION PRN
Status: DISCONTINUED | OUTPATIENT
Start: 2024-01-02 | End: 2024-01-03

## 2024-01-02 RX ORDER — PANTOPRAZOLE SODIUM 40 MG/1
40 TABLET, DELAYED RELEASE ORAL
Status: DISCONTINUED | OUTPATIENT
Start: 2024-01-03 | End: 2024-01-03

## 2024-01-02 RX ORDER — BISACODYL 10 MG
10 SUPPOSITORY, RECTAL RECTAL DAILY PRN
Status: DISCONTINUED | OUTPATIENT
Start: 2024-01-02 | End: 2024-01-03

## 2024-01-02 RX ORDER — RANOLAZINE 500 MG/1
500 TABLET, EXTENDED RELEASE ORAL 2 TIMES DAILY
Status: DISCONTINUED | OUTPATIENT
Start: 2024-01-02 | End: 2024-01-09 | Stop reason: HOSPADM

## 2024-01-02 RX ORDER — ISOSORBIDE MONONITRATE 30 MG/1
30 TABLET, EXTENDED RELEASE ORAL DAILY
Status: DISCONTINUED | OUTPATIENT
Start: 2024-01-03 | End: 2024-01-03

## 2024-01-02 RX ORDER — SENNOSIDES A AND B 8.6 MG/1
2 TABLET, FILM COATED ORAL DAILY PRN
Status: DISCONTINUED | OUTPATIENT
Start: 2024-01-02 | End: 2024-01-09 | Stop reason: HOSPADM

## 2024-01-02 RX ORDER — POTASSIUM CHLORIDE 7.45 MG/ML
10 INJECTION INTRAVENOUS PRN
Status: CANCELLED | OUTPATIENT
Start: 2024-01-02

## 2024-01-02 RX ORDER — SODIUM CHLORIDE 0.9 % (FLUSH) 0.9 %
5-40 SYRINGE (ML) INJECTION EVERY 12 HOURS SCHEDULED
Status: DISCONTINUED | OUTPATIENT
Start: 2024-01-02 | End: 2024-01-03

## 2024-01-02 RX ORDER — ISOSORBIDE MONONITRATE 30 MG/1
30 TABLET, EXTENDED RELEASE ORAL DAILY
Status: CANCELLED | OUTPATIENT
Start: 2024-01-03

## 2024-01-02 RX ORDER — LIDOCAINE 4 G/G
1 PATCH TOPICAL DAILY
Status: CANCELLED | OUTPATIENT
Start: 2024-01-03

## 2024-01-02 RX ORDER — INSULIN LISPRO 100 [IU]/ML
0-4 INJECTION, SOLUTION INTRAVENOUS; SUBCUTANEOUS NIGHTLY
Status: CANCELLED | OUTPATIENT
Start: 2024-01-02

## 2024-01-02 RX ORDER — LANOLIN ALCOHOL/MO/W.PET/CERES
1000 CREAM (GRAM) TOPICAL WEEKLY
Status: DISCONTINUED | OUTPATIENT
Start: 2024-01-05 | End: 2024-01-09 | Stop reason: HOSPADM

## 2024-01-02 RX ORDER — SODIUM CHLORIDE 0.9 % (FLUSH) 0.9 %
10 SYRINGE (ML) INJECTION PRN
Status: DISCONTINUED | OUTPATIENT
Start: 2024-01-02 | End: 2024-01-02 | Stop reason: HOSPADM

## 2024-01-02 RX ORDER — POTASSIUM CHLORIDE 20 MEQ/1
40 TABLET, EXTENDED RELEASE ORAL PRN
Status: CANCELLED | OUTPATIENT
Start: 2024-01-02

## 2024-01-02 RX ORDER — PRIMIDONE 50 MG/1
50 TABLET ORAL 2 TIMES DAILY
Status: CANCELLED | OUTPATIENT
Start: 2024-01-02

## 2024-01-02 RX ORDER — ROPINIROLE 2 MG/1
4 TABLET, FILM COATED ORAL NIGHTLY
Status: CANCELLED | OUTPATIENT
Start: 2024-01-02

## 2024-01-02 RX ORDER — BISACODYL 10 MG
10 SUPPOSITORY, RECTAL RECTAL DAILY PRN
Status: CANCELLED | OUTPATIENT
Start: 2024-01-02

## 2024-01-02 RX ORDER — ONDANSETRON 2 MG/ML
4 INJECTION INTRAMUSCULAR; INTRAVENOUS EVERY 6 HOURS PRN
Status: DISCONTINUED | OUTPATIENT
Start: 2024-01-02 | End: 2024-01-03

## 2024-01-02 RX ORDER — OXYCODONE HYDROCHLORIDE 5 MG/1
5 TABLET ORAL EVERY 4 HOURS PRN
Status: DISCONTINUED | OUTPATIENT
Start: 2024-01-02 | End: 2024-01-09 | Stop reason: HOSPADM

## 2024-01-02 RX ORDER — INSULIN LISPRO 100 [IU]/ML
0-4 INJECTION, SOLUTION INTRAVENOUS; SUBCUTANEOUS NIGHTLY
Status: DISCONTINUED | OUTPATIENT
Start: 2024-01-02 | End: 2024-01-09 | Stop reason: HOSPADM

## 2024-01-02 RX ORDER — MAGNESIUM SULFATE HEPTAHYDRATE 40 MG/ML
2000 INJECTION, SOLUTION INTRAVENOUS PRN
Status: DISCONTINUED | OUTPATIENT
Start: 2024-01-02 | End: 2024-01-03

## 2024-01-02 RX ORDER — CLOPIDOGREL BISULFATE 75 MG/1
75 TABLET ORAL DAILY
Status: DISCONTINUED | OUTPATIENT
Start: 2024-01-03 | End: 2024-01-03

## 2024-01-02 RX ORDER — ONDANSETRON 2 MG/ML
4 INJECTION INTRAMUSCULAR; INTRAVENOUS EVERY 6 HOURS PRN
Status: CANCELLED | OUTPATIENT
Start: 2024-01-02

## 2024-01-02 RX ORDER — POLYETHYLENE GLYCOL 3350 17 G/17G
17 POWDER, FOR SOLUTION ORAL DAILY PRN
Status: CANCELLED | OUTPATIENT
Start: 2024-01-02

## 2024-01-02 RX ORDER — ASPIRIN 81 MG/1
81 TABLET ORAL DAILY
Status: CANCELLED | OUTPATIENT
Start: 2024-01-03

## 2024-01-02 RX ORDER — POLYETHYLENE GLYCOL 3350 17 G/17G
17 POWDER, FOR SOLUTION ORAL DAILY
Status: CANCELLED | OUTPATIENT
Start: 2024-01-02

## 2024-01-02 RX ORDER — LANOLIN ALCOHOL/MO/W.PET/CERES
1000 CREAM (GRAM) TOPICAL WEEKLY
Status: CANCELLED | OUTPATIENT
Start: 2024-01-05

## 2024-01-02 RX ORDER — AMLODIPINE BESYLATE 5 MG/1
5 TABLET ORAL EVERY MORNING
Status: CANCELLED | OUTPATIENT
Start: 2024-01-03

## 2024-01-02 RX ORDER — ONDANSETRON 4 MG/1
4 TABLET, ORALLY DISINTEGRATING ORAL EVERY 8 HOURS PRN
Status: DISCONTINUED | OUTPATIENT
Start: 2024-01-02 | End: 2024-01-09 | Stop reason: HOSPADM

## 2024-01-02 RX ORDER — INSULIN LISPRO 100 [IU]/ML
0-8 INJECTION, SOLUTION INTRAVENOUS; SUBCUTANEOUS
Status: DISCONTINUED | OUTPATIENT
Start: 2024-01-03 | End: 2024-01-09 | Stop reason: HOSPADM

## 2024-01-02 RX ORDER — SODIUM CHLORIDE 0.9 % (FLUSH) 0.9 %
5-40 SYRINGE (ML) INJECTION PRN
Status: CANCELLED | OUTPATIENT
Start: 2024-01-02

## 2024-01-02 RX ORDER — INSULIN LISPRO 100 [IU]/ML
0-8 INJECTION, SOLUTION INTRAVENOUS; SUBCUTANEOUS
Status: CANCELLED | OUTPATIENT
Start: 2024-01-02

## 2024-01-02 RX ORDER — PRIMIDONE 50 MG/1
50 TABLET ORAL 2 TIMES DAILY
Status: DISCONTINUED | OUTPATIENT
Start: 2024-01-02 | End: 2024-01-09 | Stop reason: HOSPADM

## 2024-01-02 RX ORDER — KIT FOR THE PREPARATION OF TECHNETIUM TC 99M PENTETATE 20 MG/1
40 INJECTION, POWDER, LYOPHILIZED, FOR SOLUTION INTRAVENOUS; RESPIRATORY (INHALATION)
Status: COMPLETED | OUTPATIENT
Start: 2024-01-02 | End: 2024-01-02

## 2024-01-02 RX ORDER — POTASSIUM CHLORIDE 20 MEQ/1
40 TABLET, EXTENDED RELEASE ORAL PRN
Status: DISCONTINUED | OUTPATIENT
Start: 2024-01-02 | End: 2024-01-09 | Stop reason: HOSPADM

## 2024-01-02 RX ORDER — ATORVASTATIN CALCIUM 10 MG/1
10 TABLET, FILM COATED ORAL NIGHTLY
Status: DISCONTINUED | OUTPATIENT
Start: 2024-01-02 | End: 2024-01-09 | Stop reason: HOSPADM

## 2024-01-02 RX ORDER — DEXTROSE MONOHYDRATE 100 MG/ML
INJECTION, SOLUTION INTRAVENOUS CONTINUOUS PRN
Status: DISCONTINUED | OUTPATIENT
Start: 2024-01-02 | End: 2024-01-09 | Stop reason: HOSPADM

## 2024-01-02 RX ORDER — SODIUM CHLORIDE 0.9 % (FLUSH) 0.9 %
10 SYRINGE (ML) INJECTION PRN
Status: CANCELLED | OUTPATIENT
Start: 2024-01-02

## 2024-01-02 RX ORDER — DEXTROSE MONOHYDRATE 100 MG/ML
INJECTION, SOLUTION INTRAVENOUS CONTINUOUS PRN
Status: CANCELLED | OUTPATIENT
Start: 2024-01-02

## 2024-01-02 RX ORDER — PANTOPRAZOLE SODIUM 40 MG/1
40 TABLET, DELAYED RELEASE ORAL
Status: CANCELLED | OUTPATIENT
Start: 2024-01-03

## 2024-01-02 RX ORDER — MAGNESIUM SULFATE HEPTAHYDRATE 40 MG/ML
2000 INJECTION, SOLUTION INTRAVENOUS PRN
Status: CANCELLED | OUTPATIENT
Start: 2024-01-02

## 2024-01-02 RX ORDER — ACETAMINOPHEN 650 MG/1
650 SUPPOSITORY RECTAL EVERY 6 HOURS PRN
Status: DISCONTINUED | OUTPATIENT
Start: 2024-01-02 | End: 2024-01-09 | Stop reason: HOSPADM

## 2024-01-02 RX ORDER — SODIUM CHLORIDE 9 MG/ML
INJECTION, SOLUTION INTRAVENOUS PRN
Status: DISCONTINUED | OUTPATIENT
Start: 2024-01-02 | End: 2024-01-03

## 2024-01-02 RX ORDER — POLYETHYLENE GLYCOL 3350 17 G/17G
17 POWDER, FOR SOLUTION ORAL DAILY
Status: DISCONTINUED | OUTPATIENT
Start: 2024-01-02 | End: 2024-01-09 | Stop reason: HOSPADM

## 2024-01-02 RX ORDER — METOPROLOL SUCCINATE 25 MG/1
12.5 TABLET, EXTENDED RELEASE ORAL DAILY
Status: CANCELLED | OUTPATIENT
Start: 2024-01-03

## 2024-01-02 RX ORDER — CLOPIDOGREL BISULFATE 75 MG/1
75 TABLET ORAL DAILY
Status: CANCELLED | OUTPATIENT
Start: 2024-01-03

## 2024-01-02 RX ORDER — ENOXAPARIN SODIUM 100 MG/ML
40 INJECTION SUBCUTANEOUS NIGHTLY
Status: CANCELLED | OUTPATIENT
Start: 2024-01-02

## 2024-01-02 RX ORDER — ROPINIROLE 1 MG/1
4 TABLET, FILM COATED ORAL NIGHTLY
Status: DISCONTINUED | OUTPATIENT
Start: 2024-01-02 | End: 2024-01-03

## 2024-01-02 RX ORDER — ACETAMINOPHEN 325 MG/1
650 TABLET ORAL EVERY 4 HOURS PRN
Status: CANCELLED | OUTPATIENT
Start: 2024-01-02

## 2024-01-02 RX ORDER — RANOLAZINE 500 MG/1
500 TABLET, EXTENDED RELEASE ORAL 2 TIMES DAILY
Status: CANCELLED | OUTPATIENT
Start: 2024-01-02

## 2024-01-02 RX ORDER — ENOXAPARIN SODIUM 100 MG/ML
40 INJECTION SUBCUTANEOUS NIGHTLY
Status: DISCONTINUED | OUTPATIENT
Start: 2024-01-02 | End: 2024-01-09 | Stop reason: HOSPADM

## 2024-01-02 RX ORDER — OXYCODONE HYDROCHLORIDE 5 MG/1
5 TABLET ORAL EVERY 4 HOURS PRN
Status: CANCELLED | OUTPATIENT
Start: 2024-01-02

## 2024-01-02 RX ORDER — ASPIRIN 81 MG/1
81 TABLET ORAL DAILY
Status: DISCONTINUED | OUTPATIENT
Start: 2024-01-03 | End: 2024-01-03

## 2024-01-02 RX ORDER — SODIUM CHLORIDE 0.9 % (FLUSH) 0.9 %
5-40 SYRINGE (ML) INJECTION PRN
Status: DISCONTINUED | OUTPATIENT
Start: 2024-01-02 | End: 2024-01-03

## 2024-01-02 RX ORDER — ACETAMINOPHEN 325 MG/1
650 TABLET ORAL EVERY 6 HOURS PRN
Status: DISCONTINUED | OUTPATIENT
Start: 2024-01-02 | End: 2024-01-09 | Stop reason: HOSPADM

## 2024-01-02 RX ORDER — LIDOCAINE 4 G/G
1 PATCH TOPICAL DAILY
Status: DISCONTINUED | OUTPATIENT
Start: 2024-01-03 | End: 2024-01-09 | Stop reason: HOSPADM

## 2024-01-02 RX ORDER — BISACODYL 10 MG
10 SUPPOSITORY, RECTAL RECTAL DAILY PRN
Status: DISCONTINUED | OUTPATIENT
Start: 2024-01-02 | End: 2024-01-02 | Stop reason: SDUPTHER

## 2024-01-02 RX ORDER — ATORVASTATIN CALCIUM 10 MG/1
10 TABLET, FILM COATED ORAL NIGHTLY
Status: CANCELLED | OUTPATIENT
Start: 2024-01-02

## 2024-01-02 RX ORDER — POLYETHYLENE GLYCOL 3350 17 G/17G
17 POWDER, FOR SOLUTION ORAL DAILY PRN
Status: DISCONTINUED | OUTPATIENT
Start: 2024-01-02 | End: 2024-01-09 | Stop reason: HOSPADM

## 2024-01-02 RX ORDER — AMLODIPINE BESYLATE 5 MG/1
5 TABLET ORAL EVERY MORNING
Status: DISCONTINUED | OUTPATIENT
Start: 2024-01-03 | End: 2024-01-03

## 2024-01-02 RX ORDER — METOPROLOL SUCCINATE 25 MG/1
12.5 TABLET, EXTENDED RELEASE ORAL DAILY
Status: DISCONTINUED | OUTPATIENT
Start: 2024-01-03 | End: 2024-01-03

## 2024-01-02 RX ORDER — ACETAMINOPHEN 650 MG/1
650 SUPPOSITORY RECTAL EVERY 6 HOURS PRN
Status: CANCELLED | OUTPATIENT
Start: 2024-01-02

## 2024-01-02 RX ORDER — ACETAMINOPHEN 325 MG/1
650 TABLET ORAL EVERY 4 HOURS PRN
Status: DISCONTINUED | OUTPATIENT
Start: 2024-01-02 | End: 2024-01-02 | Stop reason: SDUPTHER

## 2024-01-02 RX ORDER — ACETAMINOPHEN 325 MG/1
650 TABLET ORAL EVERY 6 HOURS PRN
Status: CANCELLED | OUTPATIENT
Start: 2024-01-02

## 2024-01-02 RX ADMIN — ACETAMINOPHEN 650 MG: 325 TABLET ORAL at 03:30

## 2024-01-02 RX ADMIN — PRIMIDONE 50 MG: 50 TABLET ORAL at 11:55

## 2024-01-02 RX ADMIN — ASPIRIN 81 MG: 81 TABLET, COATED ORAL at 08:13

## 2024-01-02 RX ADMIN — KIT FOR THE PREPARATION OF TECHNETIUM TC 99M PENTETATE 49.4 MILLICURIE: 20 INJECTION, POWDER, LYOPHILIZED, FOR SOLUTION INTRAVENOUS; RESPIRATORY (INHALATION) at 10:50

## 2024-01-02 RX ADMIN — PRIMIDONE 50 MG: 50 TABLET ORAL at 21:55

## 2024-01-02 RX ADMIN — ROPINIROLE HYDROCHLORIDE 4 MG: 1 TABLET, FILM COATED ORAL at 21:55

## 2024-01-02 RX ADMIN — SODIUM CHLORIDE, PRESERVATIVE FREE 10 ML: 5 INJECTION INTRAVENOUS at 08:13

## 2024-01-02 RX ADMIN — METOPROLOL SUCCINATE 12.5 MG: 25 TABLET, EXTENDED RELEASE ORAL at 08:13

## 2024-01-02 RX ADMIN — PANTOPRAZOLE SODIUM 40 MG: 40 TABLET, DELAYED RELEASE ORAL at 06:07

## 2024-01-02 RX ADMIN — INSULIN LISPRO 2 UNITS: 100 INJECTION, SOLUTION INTRAVENOUS; SUBCUTANEOUS at 08:17

## 2024-01-02 RX ADMIN — PREDNISONE 40 MG: 20 TABLET ORAL at 08:13

## 2024-01-02 RX ADMIN — ISOSORBIDE MONONITRATE 30 MG: 30 TABLET, EXTENDED RELEASE ORAL at 08:13

## 2024-01-02 RX ADMIN — Medication 9.5 MILLICURIE: at 11:11

## 2024-01-02 RX ADMIN — ENOXAPARIN SODIUM 40 MG: 100 INJECTION SUBCUTANEOUS at 21:55

## 2024-01-02 RX ADMIN — RANOLAZINE 500 MG: 500 TABLET, EXTENDED RELEASE ORAL at 21:55

## 2024-01-02 RX ADMIN — AMLODIPINE BESYLATE 5 MG: 5 TABLET ORAL at 08:13

## 2024-01-02 RX ADMIN — CLOPIDOGREL BISULFATE 75 MG: 75 TABLET ORAL at 08:13

## 2024-01-02 RX ADMIN — SODIUM CHLORIDE, PRESERVATIVE FREE 10 ML: 5 INJECTION INTRAVENOUS at 11:12

## 2024-01-02 RX ADMIN — ATORVASTATIN CALCIUM 10 MG: 10 TABLET, FILM COATED ORAL at 21:54

## 2024-01-02 RX ADMIN — SODIUM CHLORIDE, PRESERVATIVE FREE 10 ML: 5 INJECTION INTRAVENOUS at 22:04

## 2024-01-02 RX ADMIN — RANOLAZINE 500 MG: 500 TABLET, EXTENDED RELEASE ORAL at 08:13

## 2024-01-02 RX ADMIN — NITROFURANTOIN MONOHYDRATE/MACROCRYSTALS 100 MG: 75; 25 CAPSULE ORAL at 08:13

## 2024-01-02 ASSESSMENT — PAIN DESCRIPTION - ORIENTATION
ORIENTATION: LEFT
ORIENTATION: LEFT

## 2024-01-02 ASSESSMENT — PAIN SCALES - GENERAL
PAINLEVEL_OUTOF10: 6
PAINLEVEL_OUTOF10: 6

## 2024-01-02 ASSESSMENT — PAIN DESCRIPTION - DESCRIPTORS
DESCRIPTORS: SHARP
DESCRIPTORS: SHARP

## 2024-01-02 ASSESSMENT — PAIN DESCRIPTION - LOCATION
LOCATION: HIP;KNEE;LEG
LOCATION: HIP;KNEE;LEG

## 2024-01-02 NOTE — CARE COORDINATION
ACUTE INPATIENT REHABILITATION  Financial Disclosure Statement: Eligibility and Benefit Verification    Patient Name: Komal Crawford MRN: [id number]     Disclosure Statement  [facility] Acute Inpatient Rehabilitation at [facility] provides 24 hour individualized service to patients with functional limitations due to, but not limited to stroke, brain injury, spinal cord injury, major multiple trauma, hip fracture, amputation, and neurological disorders.  Acute Inpatient Rehabilitation provides rehabilitative nursing, physician coverage, as well as physical therapy, occupational therapy, speech therapy, recreational therapy and other services as deemed necessary by our Board Certified Physical Medicine and Rehabilitation Physicians, Dr. Michael Harrell and Dr. Sakshi Johnson and Dr. Angela Sutton.    [facility] Acute Inpatient Rehabilitation at [facility] is fully accredited by the Commission on Accreditation of Rehabilitation Facilities (CARF) and The Joint Commission (TJC).    At a minimum, you will receive 5 days of therapy services totaling at least 15 hours per week. Your treatment program will consist of physical therapy at least 7.5 hours per week; occupational therapy 7.5 hours per week; and speech therapy 1.5 hours per week, as applicable.    Your estimated length of stay is currently:  Approximately 2 Weeks  Please Note: Estimated length of stay is based on individual condition and Acute Inpatient Rehabilitation specific needs. Length of stay may vary based upon interdisciplinary team assessment, insurance approval, and patient progression.    Your insurance coverage has been verified as follows:   Primary Insurance: Medicare   Deductible: $ 1600                       Coverage:  Per Medicare Benefit Period  Days 1-60:  $0 Co-Insurance  Days 61-90:  $400/day Co-Insurance  Days 91 and beyond:  $800/day Co-Insurance      Secondary Insurance: [facility]  Secondary insurance policies often cover

## 2024-01-02 NOTE — CARE COORDINATION
Per [place], from [facility], they can accept pt. Today at 4PM. Report is to be called to [phone]. Nurse, Joanne, nica.

## 2024-01-02 NOTE — CONSULTS
Physical Medicine & Rehabilitation  Consult Note      Admitting Physician:   Mya Melendez MD    Primary Care Provider:   Sabiha Bedolla MD     Reason for Consult:  Acute Inpatient Rehabilitation    Chief Complaint: hip pain and impaired mobility    History of Present Illness:  Referring Provider is requesting an evaluation for appropriate placement upon discharge from acute care. History from chart review and patient.    Komal Crawford is a 83 y.o.  female admitted to [facility] on 12/28/2023.      Patient admitted with worsening hip and leg pain with impaired ambulation. CT scan showed moderate osteoarthritis with no acute changes. U/A was positive - patient has known history of straight cathing for neurogenic bladder and is on chronic macrobid. Internal medicine started her on ciprofloxacin empirically for possible UTI.     Patient reports that her pain is mildly improved today. She has worsened pain when she attempts to lie flat or perform L hip extension. Flexion does not exacerbate her pain. She is declining use of oxycodone but is using Tylenol for pain.     Review of Systems:  Constitutional: negative for anorexia, chills, fatigue, fevers, sweats and weight loss  Eyes: negative for redness and visual disturbance  Ears, nose, mouth, throat, and face: negative for earaches, sore throat and tinnitus  Respiratory: negative for cough and shortness of breath  Cardiovascular: negative for chest pain, dyspnea and palpitations  Gastrointestinal: negative for abdominal pain, change in bowel habits, constipation, nausea and vomiting  Genitourinary:negative for dysuria, frequency, hesitancy and urinary incontinence. Having some retention  Integument/breast: negative for pruritus and rash  Musculoskeletal:negative for stiff joints  Neurological: negative for dizziness, headaches   Behavioral/Psych: negative for decreased appetite, depression        Premorbid function:  Modified Independent    Current

## 2024-01-02 NOTE — PLAN OF CARE
Problem: Discharge Planning  Goal: Discharge to home or other facility with appropriate resources  1/2/2024 1842 by Lauren Arce RN  Outcome: Progressing  1/2/2024 1840 by Lauren Arce RN  Outcome: Progressing     Problem: Safety - Adult  Goal: Free from fall injury  1/2/2024 1842 by Lauren Arce RN  Outcome: Progressing  1/2/2024 1840 by Lauren Arce RN  Outcome: Progressing     Problem: Pain  Goal: Verbalizes/displays adequate comfort level or baseline comfort level  1/2/2024 1842 by Lauren Arce RN  Outcome: Progressing  1/2/2024 1840 by Lauren Arce RN  Outcome: Progressing

## 2024-01-02 NOTE — CARE COORDINATION
ACUTE INPATIENT REHABILITATION  [facility]  PRE-ADMISSION ASSESSMENT    Patient Name: Komal Crawford        MRN: [id number]    : 1940  (83 y.o.)  Gender: female   Ethnicity: Not , /a or Romansh Origin  Race: White    Admitted from:  [facility]     Type of Admission:  New Admission     Date of Onset / Admission to the Acute Hospital:  2023    Inpatient Rehabilitation Admitting Diagnosis:  Debility secondary to UTI, L hip OA pain     Did patient have surgery/procedures?  No     Physicians:   Mya Melendez MD  Physician  Internal Medicine    Jon Bonner MD  Physician  Specialty: Orthopedic Surgery    Risk for Clinical Complications:  Moderate     Co-morbidities:    Debility secondary to UTI: completed course of Ciprofloxacin  Moderate OA/L Hip pain: has oxycodone prn and Lidoderm. On 5 days prednisone  Neurogenic bladder: performs ISC at home, on chronic Macrobid  DM II: carb control diet, on sliding scale  CAD: s/p stent. On ASA, plavix, ranolazine  Chronic idiopathic thrombocytopenia: stable. Follows hematology as outpatient  RLS: on ropinirole  Tremor: on primidone  Leukocytosis: being attributed to steroids  Borderline hypertension: on amlodipine, on metoprolol, Imdur    Financial Information  Primary insurance: Medicare     Secondary Insurance: Commercial Insurance:[facility] [employer]      Precautions:   []Cardiac Precautions: No Cardiac Precautions  []Total hip precautions: Yes: Posterior Hip Precautions   []Weight Bearing status: No Weight Bearing Restrictions  [x]Safety Precautions/Concerns:  Fall Risk, General Precautions, Metal implants (B TKA; R ISHAN)   [x]Visually impaired: Yes: Wears glasses at all times    []Hard of Hearing: Within Functional Limits      Communication Aids, Devices or Interpretation Services Required: None    Isolation Precautions:  Yes: Contact Precautions for MDRO        Physiatrist: Dr. Sakshi Johnson      Patients

## 2024-01-02 NOTE — DISCHARGE SUMMARY
NM LUNG VENT/PERFUSION (VQ)    Result Date: 1/2/2024  EXAMINATION: NUCLEAR MEDICINE VENTILATION PERFUSION SCAN. 1/2/2024 TECHNIQUE: 49.4 millicuries aerosolized Tc99m DTPA was administered via mask prior to planar imaging of the lungs in multiple projections.  Then, 9.5 millicuries of Tc 99m MAA was administered intravenously prior to planar imaging of the lungs in similar projections. COMPARISON: Chest radiograph 12/01/2023. HISTORY: ORDERING SYSTEM PROVIDED HISTORY: R/O PE TECHNOLOGIST PROVIDED HISTORY: R/O PE Reason for Exam: R/O  PE, D.dimer(12-29) 1.38, hip sx 11/6, no respitory symptoms noted, no CP, no hx of lung disease , wuit smoking 40 yrs ago, previous V/Q 12/1; ORDERING SYSTEM PROVIDED HISTORY: VQ scan TECHNOLOGIST PROVIDED HISTORY: VQ scan Reason for Exam: VQ scan FINDINGS: VENTILATION: Heterogeneous distribution of radiopharmaceutical is seen bilaterally. Regions of decreased activity are seen within portions of the upper, mid, and lower lung zones. PERFUSION: Heterogeneity of perfusion is demonstrated.  Regions of decreased activity are seen within portions of the upper, mid, and lower lung zones, largely matching the ventilation pattern. CHEST RADIOGRAPH: No confluent airspace disease.  No pleural effusion or pneumothorax.  Cardiac and mediastinal silhouettes are unchanged.  Calcification of aorta.  Fusion hardware within the upper lumbar spine.     Low probability for pulmonary embolism. No acute airspace disease.     MRI BRAIN WO CONTRAST    Result Date: 1/1/2024  EXAMINATION: MRI OF THE BRAIN WITHOUT CONTRAST  12/30/2023 3:21 pm TECHNIQUE: Multiplanar multisequence MRI of the brain was performed without the administration of intravenous contrast. COMPARISON: None. HISTORY: ORDERING SYSTEM PROVIDED HISTORY: diplopia TECHNOLOGIST PROVIDED HISTORY: diplopia Reason for Exam: diplopia FINDINGS: INTRACRANIAL STRUCTURES/VENTRICLES: There is no acute infarct. 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

## 2024-01-02 NOTE — PROGRESS NOTES
ACUTE INPATIENT REHABILITATION ADMISSION  [facility]    Patient Name: Komal Crawford  MRN: [id number]   Date of Admit: 1/2/2024  Time of Arrival: 1715    Patient admitted to the Acute Inpatient Rehabilitation Unit via Wheelchair    Patient oriented to room, unit and fall prevention safety measures.  Education provided on the rehabilitation routine and therapy schedules.    Drug / Medication Regimen Review   Admitting medication orders compared with acute stay medications; home medication list reviewed with patient/family.      Medication Issues Identified ? No If Yes, Check All That Apply   []  Allergy to medication   []  Drug interactions (drug/drug, drug/food, drug/disease interactions)   []  Duplicate drug   []  Omission (drug missing from prescribed regimen)   []  Non adherence   []  Adverse reaction   []  Wrong patient, drug, dose, route, time error   []  Ineffective drug therapy       High-Risk Drug Classes: Use and Indication   Check if the patient is taking any medications by pharmacological classification If yes, check if there is an indication noted for all meds in the drug class   Antipsychotic No If yes: indication noted?  [] If no indication noted, follow up with provider for order clarification   Anticoagulant Yes If yes: indication noted?  []    Antibiotic No If yes: indication noted?  []    Opioid Yes If yes: indication noted?  []    Antiplatelet Yes If yes: indication noted?  []    Hypoglycemic (Including Insulin) No If yes: indication noted?  []      Attending Physical Medicine & Rehabilitation (PM&R) Admitting Order Review   Admission orders reviewed with Acute Inpatient Rehabilitation Attending PM&R Physician: Sakshi Johnson MD     Skin Assessment   Skin assessment performed by two nurses: all active alterations in skin integrity, wounds, lines, drains and airways assessed, measured, recorded and reconciled. Refer to LDA avatar and LDA flowsheet for additional information.    Nurse 1

## 2024-01-02 NOTE — PROGRESS NOTES
[facility]   IN-PATIENT SERVICE   [facility]    HISTORY AND PHYSICAL EXAMINATION            Date:   1/1/2024  Patient name:  Komal Crawford  Date of admission:  12/28/2023 11:28 AM  MRN:   [id number]  Account:  [id number]  YOB: 1940  PCP:    Sabiha Bedolla MD  Room:   2060/2060-01  Code Status:    Full Code    Chief Complaint:     Chief Complaint   Patient presents with    Hip Pain    Back Pain       History Obtained From:     patient    History of Present Illness:     The patient is a 83 y.o.  Non- / non  female who presents with Hip Pain and Back Pain   and she is admitted to the hospital for the management of    Patient is 83-year-old female with past medical history of hypertension, hyperlipidemia CAD s/p stent in 2022  recently had right hip arthroplasty, , history of type 2 diabetes mellitus presented to the ER with worsening leg and back pain.  Patient states that she was supposed to go to physical therapy but could not go this week due to severe pain, pain started a week ago, now getting worse, radiating onto the left leg, patient had CT hip and x-ray which was nonacute  ER physician tried to discharge patient but patient could not ambulate,  Also found to have UTI, patient states that she has neurogenic bladder and straight cath herself, on chronic Macrobid,  Denies any chest pain shortness of breath      Past Medical History:     Past Medical History:   Diagnosis Date    Age-related cognitive decline     Ankle pain     Left ankle fracture in ortho boat.    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HCC) 1990    left with urinary incontinenc post surgical    Caffeine use     2 coffee/day, 1-2 tea per week    Cerebral artery occlusion with cerebral infarction (HCC)     Chronic back pain     Chronic ITP (idiopathic thrombocytopenia) (HCC)     CVA (cerebral infarction) 2008, 2010    balance issues, short term memory loss 
   12/30/23 1346   Encounter Summary   Encounter Overview/Reason  Initial Encounter   Service Provided For: Patient   Referral/Consult From: Rounding   Last Encounter  12/30/23   Complexity of Encounter Moderate   Spiritual/Emotional needs   Type Spiritual Support   Assessment/Intervention/Outcome   Assessment Calm   Intervention Active listening;Discussed illness injury and it’s impact;Discussed relationship with God;Explored/Affirmed feelings, thoughts, concerns;Prayer (assurance of)/[place];Sustaining Presence/Ministry of presence   Outcome Engaged in conversation;Expressed feelings, needs, and concerns;Expressed Gratitude;Receptive       
[facility]   OCCUPATIONAL THERAPY MISSED TREATMENT NOTE   INPATIENT   Date: 24  Patient Name: Komal Crawford       Room:   MRN: [id number]   Account #: [id number]    : 1940  (83 y.o.)  Gender: female   Referring Practitioner: Mya Melendez MD  Diagnosis: UTI             REASON FOR MISSED TREATMENT:  Patient at testing and/or off the floor   -   Pt out of room at this time for VQ scan. Will attempt later if time allows.     1038      Electronically signed by SHERRI Kaur on 24 at 10:59 AM EST   
[facility]   Occupational Therapy Evaluation  Date: 23  Patient Name: Komal Crawford       Room:   MRN: [id number]  Account: [id number]   : 1940  (83 y.o.) Gender: female     Discharge Recommendations:  Further Occupational Therapy is recommended upon facility discharge.    OT Equipment Recommendations  Other: TBD    Referring Practitioner: Mya Melendez MD  Diagnosis: UTI      Treatment Diagnosis: Impaired self care status    Past Medical History:  has a past medical history of Age-related cognitive decline, Ankle pain, Anxiety, B12 deficiency, Winters esophagus, 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[facility]), Caffeine use, Cerebral artery occlusion with cerebral infarction ([facility]), Chronic back pain, Chronic ITP (idiopathic thrombocytopenia) ([facility]), CVA (cerebral infarction), Diabetic gastroparesis ([facility]), Diverticular disease, GERD (gastroesophageal reflux disease), Hiatal hernia, Hip fracture ([facility]), History of blood transfusion, History of decreased platelet count, Hyperlipemia, Hypertension, Internal hemorrhoid, Macular degeneration of left eye, Nausea and vomiting, Neuropathy, Osteoarthritis, Ringing in ears, Self-catheterizes urinary bladder, TIA (transient ischemic attack), Tubular adenoma, Type II or unspecified type diabetes mellitus without mention of complication, not stated as uncontrolled, Urinary incontinence, Wears dentures, and Wears glasses.    Past Surgical History:   has a past surgical history that includes bladder suspension (); Hysterectomy (); Tonsillectomy (); Appendectomy (); Spine surgery (); colostomy (); Revision Colostomy (); Spine surgery (); Upper gastrointestinal endoscopy; Bladder surgery; Upper gastrointestinal endoscopy (2014); cardiovascular stress test (2014); Colon surgery; Total abdominal hysterectomy w/ bilateral salpingoophorectomy; fracture surgery (Right, ); fracture surgery (Left, ); fracture 
New consult sent to Dr Gibbs  
[facility]   INPATIENT OCCUPATIONAL THERAPY  PROGRESS NOTE  Date: 2024  Patient Name: Komal Crawford       Room:   MRN: [id number]    : 1940  (83 y.o.)  Gender: female   Referring Practitioner: Mya Melendez MD  Diagnosis: UTI      Discharge Recommendations:  Further Occupational Therapy is recommended upon facility discharge.    OT Equipment Recommendations  Other: TBD    Restrictions/Precautions  Restrictions/Precautions  Restrictions/Precautions: Fall Risk;Weight Bearing  Required Braces or Orthoses?: No  Implants present? : Metal implants  Position Activity Restriction  Hip Precautions: Posterior hip precautions    O2 Device: None (Room air)    Subjective  Subjective  Subjective: \" My pain is so much better, it mostly hurts when I lay flat\"  Subjective  Subjective: Pt supine in bed upon writer arrival, agreeable to therapy  Pain: 3/10 pain L hip/knee  Comments: OK per LETY Rubio    Objective  Orientation  Overall Orientation Status: Within Functional Limits  Cognition  Overall Cognitive Status: Exceptions  Arousal/Alertness: Appropriate responses to stimuli  Following Commands: Follows multistep commands with increased time  Attention Span: Attends with cues to redirect  Safety Judgement: Decreased awareness of need for safety  Problem Solving: Assistance required to generate solutions;Assistance required to correct errors made  Insights: Fully aware of deficits  Initiation: Requires cues for some  Sequencing: Requires cues for some    Activities of Daily Living  ADL  Feeding: Setup  Grooming: Stand by assistance  UE Bathing: Stand by assistance  LE Bathing: Moderate assistance  UE Dressing: Stand by assistance  LE Dressing: Moderate assistance  Toileting: Minimal assistance (Pt completed toilet transfer, but states she self caths 4 times a day)  Additional Comments: ADL scores based on clinical reasoning and skilled observation unless otherwise noted. Pt currently 
Physical Therapy  Facility/Department: [facility] MED SURG  Daily Treatment Note  NAME: Komal Crawford  : 1940  MRN: [id number]    Date of Service: 2023    Discharge Recommendations:  Patient would benefit from continued therapy after discharge, Therapy recommended at discharge   PT Equipment Recommendations  Other: Pt has rolling walker, Rollator and st cane at home    Patient Diagnosis(es): The primary encounter diagnosis was Hip strain, left, initial encounter. Diagnoses of Contusion of left hip, initial encounter, Acute cystitis without hematuria, Urinary tract infection without hematuria, site unspecified, History of total hip arthroplasty, right, Pressure injury of right buttock, stage 2 (HCC), and Leg edema were also pertinent to this visit.      Activity Tolerance: Patient limited by pain  Other: Pt has rolling walker, Rollator and st cane at home     Plan    Physical Therapy Plan  Specific Instructions for Next Treatment: Co-ordinate pain meds prior to therapy session. Checl L LE dopplers results prior to next treatment .  Current Treatment Recommendations: Strengthening;Balance training;ROM;Functional mobility training;Transfer training;Endurance training;Gait training;Home exercise program;Safety education & training;Patient/Caregiver education & training;Therapeutic activities     Restrictions  Restrictions/Precautions  Restrictions/Precautions: Fall Risk, Weight Bearing  Required Braces or Orthoses?: No  Implants present? : Metal implants (B TKA; R ISHAN)  Position Activity Restriction  Hip Precautions: Posterior hip precautions     Subjective    Pt pleasant and agreeable to PT, pt states they may start her on steroids for L LE Pain  Pain: Pt rates pain 10+/10 c movement of L LE from hip to foot.    Cognition  Overall Cognitive Status: Exceptions  Arousal/Alertness: Appropriate responses to stimuli  Following Commands: Follows multistep commands with increased time  Attention Span: Attends with cues to 
Physical Therapy  Facility/Department: [facility] MED SURG  Daily Treatment Note  NAME: Komal Crawford  : 1940  MRN: [id number]    Date of Service: 2023    Discharge Recommendations:  Patient would benefit from continued therapy after discharge, Therapy recommended at discharge        Patient Diagnosis(es): The primary encounter diagnosis was Hip strain, left, initial encounter. Diagnoses of Contusion of left hip, initial encounter, Acute cystitis without hematuria, Urinary tract infection without hematuria, site unspecified, History of total hip arthroplasty, right, Pressure injury of right buttock, stage 2 (HCC), and Leg edema were also pertinent to this visit.    Activity Tolerance: Patient limited by pain     Plan    Physical Therapy Plan  General Plan: 5-7 times per week  Specific Instructions for Next Treatment: Co-ordinate pain meds prior to therapy session. Checl L LE dopplers results prior to next treatment .  Current Treatment Recommendations: Strengthening;Balance training;ROM;Functional mobility training;Transfer training;Endurance training;Gait training;Home exercise program;Safety education & training;Patient/Caregiver education & training;Therapeutic activities     Restrictions  Restrictions/Precautions  Restrictions/Precautions: Fall Risk, Weight Bearing  Required Braces or Orthoses?: No  Implants present? : Metal implants (B TKA; R ISHAN)  Position Activity Restriction  Hip Precautions: Posterior hip precautions     Subjective    Pt pleasant and agreeable to PT, L LE doppler ( - ) from , CT of head from  ( - ) for any acute findings, Nurse Aster bruner PT.    Pain: Pt reports 1010 pain with movement to L hip, knee and down to the foot    Cognition  Arousal/Alertness: Appropriate responses to stimuli  Following Commands: Follows multistep commands with increased time  Attention Span: Attends with cues to redirect  Safety Judgement: Decreased awareness of need for safety  Problem Solving: 
Physical Therapy  Facility/Department: [facility] MED SURG  Daily Treatment Note  NAME: Komal Crawford  : 1940  MRN: [id number]    Date of Service: 2024    Discharge Recommendations:  Patient would benefit from continued therapy after discharge, Therapy recommended at discharge   PT Equipment Recommendations  Other: Pt has rolling walker, Rollator and st cane at home    Patient Diagnosis(es): The primary encounter diagnosis was Hip strain, left, initial encounter. Diagnoses of Contusion of left hip, initial encounter, Acute cystitis without hematuria, Urinary tract infection without hematuria, site unspecified, History of total hip arthroplasty, right, Pressure injury of right buttock, stage 2 (HCC), and Leg edema were also pertinent to this visit.    Assessment   Activity Tolerance: Patient limited by pain  Other: Pt has rolling walker, Rollator and st cane at home     Plan    Physical Therapy Plan  General Plan: 5-7 times per week  Specific Instructions for Next Treatment: Co-ordinate pain meds prior to therapy session.  Current Treatment Recommendations: Strengthening;Balance training;ROM;Functional mobility training;Transfer training;Endurance training;Gait training;Home exercise program;Safety education & training;Patient/Caregiver education & training;Therapeutic activities     Restrictions  Restrictions/Precautions  Restrictions/Precautions: Fall Risk, Weight Bearing  Required Braces or Orthoses?: No  Implants present? : Metal implants (B TKA; R ISHAN)  Position Activity Restriction  Hip Precautions: Posterior hip precautions     Subjective    Subjective  Subjective: Pt up in recliner chair on writers arrival. Pt is pleasant and cooperative with therapy  Pain: reports 6/10 pain in L hip/knee. States having sciatic nerve pain in LLE  Orientation  Overall Orientation Status: Within Functional Limits  Cognition  Overall Cognitive Status: Exceptions  Arousal/Alertness: Appropriate responses to stimuli  Following 
Physical Therapy  Facility/Department: [facility] MED SURG  Physical Therapy Initial Assessment    Name: Komal Crawford  : 1940  MRN: [id number]  Date of Service: 2023    Discharge Recommendations:  Patient would benefit from continued therapy after discharge, Therapy recommended at discharge   PT Equipment Recommendations  Other: Pt has rolling walker, Rollator and st cane at home      Patient Diagnosis(es): The primary encounter diagnosis was Hip strain, left, initial encounter. Diagnoses of Contusion of left hip, initial encounter, Acute cystitis without hematuria, Urinary tract infection without hematuria, site unspecified, History of total hip arthroplasty, right, Pressure injury of right buttock, stage 2 (HCC), and Leg edema were also pertinent to this visit.  Past Medical History:  has a past medical history of Age-related cognitive decline, Ankle pain, Anxiety, B12 deficiency, Winters esophagus, 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[facility]), Caffeine use, Cerebral artery occlusion with cerebral infarction ([facility]), Chronic back pain, Chronic ITP (idiopathic thrombocytopenia) ([facility]), CVA (cerebral infarction), Diabetic gastroparesis ([facility]), Diverticular disease, GERD (gastroesophageal reflux disease), Hiatal hernia, Hip fracture ([facility]), History of blood transfusion, History of decreased platelet count, Hyperlipemia, Hypertension, Internal hemorrhoid, Macular degeneration of left eye, Nausea and vomiting, Neuropathy, Osteoarthritis, Ringing in ears, Self-catheterizes urinary bladder, TIA (transient ischemic attack), Tubular adenoma, Type II or unspecified type diabetes mellitus without mention of complication, not stated as uncontrolled, Urinary incontinence, Wears dentures, and Wears glasses.  Past Surgical History:  has a past surgical history that includes bladder suspension (); Hysterectomy (); Tonsillectomy (); Appendectomy (); Spine surgery (); colostomy (); Revision Colostomy (); Spine 
Pt arrived to medical surgical floor alert and oriented. Epc cuffs on.  Call light within reach. Vitals obtained   
   Breast Cancer Sister          58    Cancer Sister         breast       Review of Systems:     Positive and Negative as described in HPI.    CONSTITUTIONAL:  negative for fevers, chills, sweats, fatigue, weight loss  HEENT:  negative for vision, hearing changes, runny nose, throat pain  RESPIRATORY:  negative for shortness of breath, cough, congestion, wheezing.  CARDIOVASCULAR:  negative for chest pain, palpitations.  GASTROINTESTINAL:  negative for nausea, vomiting, diarrhea, constipation, change in bowel habits, abdominal pain   GENITOURINARY:  negative for difficulty of urination, burning with urination, frequency   INTEGUMENT:  negative for rash, skin lesions, easy bruising   HEMATOLOGIC/LYMPHATIC:  negative for swelling/edema   ALLERGIC/IMMUNOLOGIC:  negative for urticaria , itching  ENDOCRINE:  negative increase in drinking, increase in urination, hot or cold intolerance  MUSCULOSKELETAL: Positive for back pain and left hip pain and leg NEUROLOGICAL:  negative for headaches, dizziness, lightheadedness, numbness, pain, tingling extremities  BEHAVIOR/PSYCH:  negative for depression, anxiety    Physical Exam:   BP (!) 150/79   Pulse 63   Temp 97.3 °F (36.3 °C) (Oral)   Resp 18   Ht 1.448 m (4' 9\")   Wt 72.6 kg (160 lb)   SpO2 94%   BMI 34.62 kg/m²   Temp (24hrs), Av.3 °F (36.3 °C), Min:97.3 °F (36.3 °C), Max:97.3 °F (36.3 °C)    Recent Labs     23  1147 23  1601 23  1954 23  0603   POCGLU 203* 224* 201* 174*       Intake/Output Summary (Last 24 hours) at 2023 0902  Last data filed at 2023 0130  Gross per 24 hour   Intake --   Output 400 ml   Net -400 ml       General Appearance:  alert, well appearing, and in no acute distress  Mental status: oriented to person, place, and time with normal affect  Head:  normocephalic, atraumatic.  Eye: no icterus, redness, pupils equal and reactive, extraocular eye movements intact, conjunctiva clear  Ear: normal external 
no drainage noted  Mouth: mucous membranes moist  Neck: supple, no carotid bruits, thyroid not palpable  Lungs: Bilateral equal air entry, clear to ausculation, no wheezing, rales or rhonchi, normal effort  Cardiovascular: normal rate, regular rhythm, no murmur, gallop, rub.  Abdomen: Soft, nontender, nondistended, normal bowel sounds, no hepatomegaly or splenomegaly  Neurologic: There are no new focal motor or sensory deficits, normal muscle tone and bulk, no abnormal sensation, normal speech, cranial nerves II through XII grossly intact  Skin: No gross lesions, rashes, bruising or bleeding on exposed skin area  Extremities:  peripheral pulses palpable, no pedal edema or calf pain with palpation  Psych: normal affect     Investigations:      Laboratory Testing:  Recent Results (from the past 24 hour(s))   CBC with Auto Differential    Collection Time: 12/28/23 12:39 PM   Result Value Ref Range    WBC 5.3 3.5 - 11.0 k/uL    RBC 4.15 4.0 - 5.2 m/uL    Hemoglobin 11.5 (L) 12.0 - 16.0 g/dL    Hematocrit 36.7 36 - 46 %    MCV 88.4 80 - 100 fL    MCH 27.8 26 - 34 pg    MCHC 31.4 31 - 37 g/dL    RDW 15.2 (H) 11.5 - 14.9 %    Platelets 132 (L) 150 - 450 k/uL    MPV 12.0 6.0 - 12.0 fL    Neutrophils % 71 (H) 36 - 66 %    Lymphocytes % 18 (L) 24 - 44 %    Monocytes % 6 1 - 7 %    Eosinophils % 2 0 - 4 %    Basophils % 3 (H) 0 - 2 %    Neutrophils Absolute 3.80 1.3 - 9.1 k/uL    Lymphocytes Absolute 1.00 1.0 - 4.8 k/uL    Monocytes Absolute 0.30 0.1 - 1.3 k/uL    Eosinophils Absolute 0.10 0.0 - 0.4 k/uL    Basophils Absolute 0.10 0.0 - 0.2 k/uL   Comprehensive Metabolic Panel    Collection Time: 12/28/23 12:39 PM   Result Value Ref Range    Sodium 141 135 - 144 mmol/L    Potassium 3.9 3.7 - 5.3 mmol/L    Chloride 106 98 - 107 mmol/L    CO2 24 20 - 31 mmol/L    Anion Gap 11 9 - 17 mmol/L    Glucose 159 (H) 70 - 99 mg/dL    BUN 17 8 - 23 mg/dL    Creatinine 0.7 0.5 - 0.9 mg/dL    Est, Glom Filt Rate >60 >60 mL/min/1.73m2    
allergic to contrast  Patient complaining of seeing double only in the morning, patient said that she saw 8 pills while she knew it was only 4 pills, otherwise denies any diplopia, stated that she was slightly dizzy and lightheaded , otherwise no other neurological deficit, strength in the left leg 4/5 secondary to pain, otherwise no other neurological deficit, will do stat head CT  Denying any diplopia and no  CT lumbar nonacute  Borderline hypotensive, increase Norvasc to 5  Check Ortho  Consultations:   IP CONSULT TO HOSPITALIST  IP CONSULT TO PHYSICAL MEDICINE REHAB     Patient is admitted as inpatient status because of co-morbidities listed above, severity of signs and symptoms as outlined, requirement for current medical therapies and most importantly because of direct risk to patient if care not provided in a hospital setting.    Mya Melendez MD  12/30/2023  8:21 AM    Copy sent to Sabiha Christina MD    Please note that this chart was generated using voice recognition Dragon dictation software.  Although every effort was made to ensure the accuracy of this automated transcription, some errors in transcription may have occurred.

## 2024-01-02 NOTE — FLOWSHEET NOTE
[x] [facility]   Outpatient Rehabilitation & Therapy  [address]  P: [phone]   F: [phone]     Physical Therapy Cancel/No Show note    Date: 2024  Patient: Komal Crawford  : 1940  MRN: [id number]    Visit Count:   Cancels/No Shows to date:     For today's appointment patient:    []  Cancelled    [] Rescheduled appointment    [x] No-show     Reason given by patient:    []  Patient ill    []  Conflicting appointment    [] No transportation      [] Conflict with work    [] No reason given    [] Weather related    [] COVID-19    [x] Other:      Comments:  Per pt's chart, the patient is currently admitted to the hospital is will be discharging to SNF.  Pt will be discharged from OP PT at this time.      [] Next appointment was confirmed    Electronically signed by: Sophia Aguirre PTA

## 2024-01-02 NOTE — CARE COORDINATION
ONGOING DISCHARGE PLAN:    Patient is alert and oriented x4.    Spoke with patient regarding discharge plan and patient confirms that plan is still to DC to [facility].    PM& R consult today. Appropriate & pt. Is agreeable.    [facility], is [facility], Pt, is also accepted there.    Pt. Had VQ scan & CXR today. Awaiting results.     [facility], could accept today.      Will continue to follow for additional discharge needs.    If patient is discharged prior to next notation, then this note serves as note for discharge by case management.    Electronically signed by Lula Mcwilliams RN on 1/2/2024 at 2:12 PM

## 2024-01-03 LAB
ALBUMIN SERPL-MCNC: 4.1 G/DL (ref 3.5–5.2)
ALP SERPL-CCNC: 97 U/L (ref 35–104)
ALT SERPL-CCNC: 27 U/L (ref 5–33)
ANION GAP SERPL CALCULATED.3IONS-SCNC: 10 MMOL/L (ref 9–17)
AST SERPL-CCNC: 21 U/L
BASOPHILS # BLD: 0.1 K/UL (ref 0–0.2)
BASOPHILS NFR BLD: 1 % (ref 0–2)
BILIRUB DIRECT SERPL-MCNC: 0.1 MG/DL
BILIRUB INDIRECT SERPL-MCNC: 0.2 MG/DL (ref 0–1)
BILIRUB SERPL-MCNC: 0.3 MG/DL (ref 0.3–1.2)
BUN SERPL-MCNC: 27 MG/DL (ref 8–23)
CALCIUM SERPL-MCNC: 9.1 MG/DL (ref 8.6–10.4)
CHLORIDE SERPL-SCNC: 99 MMOL/L (ref 98–107)
CO2 SERPL-SCNC: 24 MMOL/L (ref 20–31)
CREAT SERPL-MCNC: 0.8 MG/DL (ref 0.5–0.9)
EOSINOPHIL # BLD: 0.1 K/UL (ref 0–0.4)
EOSINOPHILS RELATIVE PERCENT: 1 % (ref 0–4)
ERYTHROCYTE [DISTWIDTH] IN BLOOD BY AUTOMATED COUNT: 15.2 % (ref 11.5–14.9)
GFR SERPL CREATININE-BSD FRML MDRD: >60 ML/MIN/1.73M2
GLUCOSE BLD-MCNC: 188 MG/DL (ref 65–105)
GLUCOSE BLD-MCNC: 215 MG/DL (ref 65–105)
GLUCOSE BLD-MCNC: 231 MG/DL (ref 65–105)
GLUCOSE BLD-MCNC: 240 MG/DL (ref 65–105)
GLUCOSE SERPL-MCNC: 177 MG/DL (ref 70–99)
HCT VFR BLD AUTO: 36.1 % (ref 36–46)
HGB BLD-MCNC: 11.4 G/DL (ref 12–16)
LYMPHOCYTES NFR BLD: 2.39 K/UL (ref 1–4.8)
LYMPHOCYTES RELATIVE PERCENT: 23 % (ref 24–44)
MCH RBC QN AUTO: 28 PG (ref 26–34)
MCHC RBC AUTO-ENTMCNC: 31.6 G/DL (ref 31–37)
MCV RBC AUTO: 88.5 FL (ref 80–100)
MONOCYTES NFR BLD: 0.83 K/UL (ref 0.1–1.3)
MONOCYTES NFR BLD: 8 % (ref 1–7)
MORPHOLOGY: ABNORMAL
NEUTROPHILS NFR BLD: 67 % (ref 36–66)
NEUTS SEG NFR BLD: 6.98 K/UL (ref 1.3–9.1)
PLATELET # BLD AUTO: 132 K/UL (ref 150–450)
PMV BLD AUTO: 12.6 FL (ref 6–12)
POTASSIUM SERPL-SCNC: 4.6 MMOL/L (ref 3.7–5.3)
PROT SERPL-MCNC: 6.9 G/DL (ref 6.4–8.3)
RBC # BLD AUTO: 4.07 M/UL (ref 4–5.2)
SODIUM SERPL-SCNC: 133 MMOL/L (ref 135–144)
WBC OTHER # BLD: 10.4 K/UL (ref 3.5–11)

## 2024-01-03 PROCEDURE — 6370000000 HC RX 637 (ALT 250 FOR IP): Performed by: INTERNAL MEDICINE

## 2024-01-03 PROCEDURE — 80076 HEPATIC FUNCTION PANEL: CPT

## 2024-01-03 PROCEDURE — 97116 GAIT TRAINING THERAPY: CPT

## 2024-01-03 PROCEDURE — 1180000000 HC REHAB R&B

## 2024-01-03 PROCEDURE — 80048 BASIC METABOLIC PNL TOTAL CA: CPT

## 2024-01-03 PROCEDURE — 97166 OT EVAL MOD COMPLEX 45 MIN: CPT

## 2024-01-03 PROCEDURE — 51701 INSERT BLADDER CATHETER: CPT

## 2024-01-03 PROCEDURE — 6360000002 HC RX W HCPCS: Performed by: INTERNAL MEDICINE

## 2024-01-03 PROCEDURE — 6370000000 HC RX 637 (ALT 250 FOR IP): Performed by: PHYSICAL MEDICINE & REHABILITATION

## 2024-01-03 PROCEDURE — 36415 COLL VENOUS BLD VENIPUNCTURE: CPT

## 2024-01-03 PROCEDURE — 97162 PT EVAL MOD COMPLEX 30 MIN: CPT

## 2024-01-03 PROCEDURE — 85025 COMPLETE CBC W/AUTO DIFF WBC: CPT

## 2024-01-03 PROCEDURE — 82947 ASSAY GLUCOSE BLOOD QUANT: CPT

## 2024-01-03 PROCEDURE — 99223 1ST HOSP IP/OBS HIGH 75: CPT | Performed by: PHYSICAL MEDICINE & REHABILITATION

## 2024-01-03 PROCEDURE — 97535 SELF CARE MNGMENT TRAINING: CPT

## 2024-01-03 PROCEDURE — 97530 THERAPEUTIC ACTIVITIES: CPT

## 2024-01-03 PROCEDURE — 97110 THERAPEUTIC EXERCISES: CPT

## 2024-01-03 RX ORDER — CLOPIDOGREL BISULFATE 75 MG/1
75 TABLET ORAL DAILY
Status: DISCONTINUED | OUTPATIENT
Start: 2024-01-04 | End: 2024-01-09 | Stop reason: HOSPADM

## 2024-01-03 RX ORDER — AMLODIPINE BESYLATE 2.5 MG/1
2.5 TABLET ORAL EVERY MORNING
Status: DISCONTINUED | OUTPATIENT
Start: 2024-01-04 | End: 2024-01-09 | Stop reason: HOSPADM

## 2024-01-03 RX ORDER — GLIPIZIDE 5 MG/1
2.5 TABLET ORAL
Status: DISCONTINUED | OUTPATIENT
Start: 2024-01-04 | End: 2024-01-09 | Stop reason: HOSPADM

## 2024-01-03 RX ORDER — ASPIRIN 81 MG/1
81 TABLET ORAL DAILY
Status: DISCONTINUED | OUTPATIENT
Start: 2024-01-04 | End: 2024-01-09 | Stop reason: HOSPADM

## 2024-01-03 RX ORDER — TRAMADOL HYDROCHLORIDE 50 MG/1
50 TABLET ORAL EVERY 6 HOURS PRN
Status: DISCONTINUED | OUTPATIENT
Start: 2024-01-03 | End: 2024-01-09 | Stop reason: HOSPADM

## 2024-01-03 RX ORDER — BISACODYL 5 MG/1
5 TABLET, DELAYED RELEASE ORAL 2 TIMES DAILY PRN
Status: DISCONTINUED | OUTPATIENT
Start: 2024-01-03 | End: 2024-01-09 | Stop reason: HOSPADM

## 2024-01-03 RX ORDER — ISOSORBIDE MONONITRATE 30 MG/1
30 TABLET, EXTENDED RELEASE ORAL DAILY
Status: DISCONTINUED | OUTPATIENT
Start: 2024-01-04 | End: 2024-01-09 | Stop reason: HOSPADM

## 2024-01-03 RX ORDER — NITROFURANTOIN 25; 75 MG/1; MG/1
100 CAPSULE ORAL EVERY 12 HOURS SCHEDULED
Status: DISCONTINUED | OUTPATIENT
Start: 2024-01-03 | End: 2024-01-05 | Stop reason: SDUPTHER

## 2024-01-03 RX ORDER — METOPROLOL SUCCINATE 25 MG/1
12.5 TABLET, EXTENDED RELEASE ORAL DAILY
Status: DISCONTINUED | OUTPATIENT
Start: 2024-01-04 | End: 2024-01-09 | Stop reason: HOSPADM

## 2024-01-03 RX ORDER — INSULIN GLARGINE 100 [IU]/ML
10 INJECTION, SOLUTION SUBCUTANEOUS ONCE
Status: DISCONTINUED | OUTPATIENT
Start: 2024-01-03 | End: 2024-01-09 | Stop reason: HOSPADM

## 2024-01-03 RX ORDER — ROPINIROLE 1 MG/1
4 TABLET, FILM COATED ORAL NIGHTLY
Status: DISCONTINUED | OUTPATIENT
Start: 2024-01-03 | End: 2024-01-09 | Stop reason: HOSPADM

## 2024-01-03 RX ORDER — PANTOPRAZOLE SODIUM 40 MG/1
40 TABLET, DELAYED RELEASE ORAL
Status: DISCONTINUED | OUTPATIENT
Start: 2024-01-04 | End: 2024-01-09 | Stop reason: HOSPADM

## 2024-01-03 RX ORDER — GLIPIZIDE 5 MG/1
7.5 TABLET ORAL
Status: DISCONTINUED | OUTPATIENT
Start: 2024-01-04 | End: 2024-01-09 | Stop reason: HOSPADM

## 2024-01-03 RX ADMIN — RANOLAZINE 500 MG: 500 TABLET, EXTENDED RELEASE ORAL at 21:11

## 2024-01-03 RX ADMIN — CLOPIDOGREL BISULFATE 75 MG: 75 TABLET ORAL at 08:24

## 2024-01-03 RX ADMIN — PANTOPRAZOLE SODIUM 40 MG: 40 TABLET, DELAYED RELEASE ORAL at 12:39

## 2024-01-03 RX ADMIN — INSULIN LISPRO 2 UNITS: 100 INJECTION, SOLUTION INTRAVENOUS; SUBCUTANEOUS at 12:09

## 2024-01-03 RX ADMIN — TRAMADOL HYDROCHLORIDE 50 MG: 50 TABLET, COATED ORAL at 02:54

## 2024-01-03 RX ADMIN — ROPINIROLE HYDROCHLORIDE 4 MG: 1 TABLET, FILM COATED ORAL at 21:40

## 2024-01-03 RX ADMIN — ACETAMINOPHEN 650 MG: 325 TABLET ORAL at 12:27

## 2024-01-03 RX ADMIN — ATORVASTATIN CALCIUM 10 MG: 10 TABLET, FILM COATED ORAL at 21:12

## 2024-01-03 RX ADMIN — ASPIRIN 81 MG: 81 TABLET, COATED ORAL at 08:24

## 2024-01-03 RX ADMIN — AMLODIPINE BESYLATE 5 MG: 5 TABLET ORAL at 08:24

## 2024-01-03 RX ADMIN — PRIMIDONE 50 MG: 50 TABLET ORAL at 08:28

## 2024-01-03 RX ADMIN — INSULIN LISPRO 2 UNITS: 100 INJECTION, SOLUTION INTRAVENOUS; SUBCUTANEOUS at 17:02

## 2024-01-03 RX ADMIN — TRAMADOL HYDROCHLORIDE 50 MG: 50 TABLET, COATED ORAL at 21:12

## 2024-01-03 RX ADMIN — POLYETHYLENE GLYCOL 3350 17 G: 17 POWDER, FOR SOLUTION ORAL at 08:24

## 2024-01-03 RX ADMIN — ISOSORBIDE MONONITRATE 30 MG: 30 TABLET, EXTENDED RELEASE ORAL at 08:24

## 2024-01-03 RX ADMIN — ROPINIROLE HYDROCHLORIDE 4 MG: 1 TABLET, FILM COATED ORAL at 21:11

## 2024-01-03 RX ADMIN — METOPROLOL SUCCINATE 12.5 MG: 25 TABLET, EXTENDED RELEASE ORAL at 08:24

## 2024-01-03 RX ADMIN — PRIMIDONE 50 MG: 50 TABLET ORAL at 21:11

## 2024-01-03 RX ADMIN — ENOXAPARIN SODIUM 40 MG: 100 INJECTION SUBCUTANEOUS at 21:11

## 2024-01-03 RX ADMIN — NITROFURANTOIN MONOHYDRATE/MACROCRYSTALS 100 MG: 75; 25 CAPSULE ORAL at 21:12

## 2024-01-03 RX ADMIN — RANOLAZINE 500 MG: 500 TABLET, EXTENDED RELEASE ORAL at 08:28

## 2024-01-03 ASSESSMENT — PAIN DESCRIPTION - ORIENTATION
ORIENTATION: LEFT

## 2024-01-03 ASSESSMENT — 9 HOLE PEG TEST
TEST_RESULT: IMPAIRED
TESTTIME_SECONDS: 31.1
TESTTIME_SECONDS: 36.1
TEST_RESULT: FUNCTIONAL

## 2024-01-03 ASSESSMENT — PAIN SCALES - GENERAL
PAINLEVEL_OUTOF10: 4
PAINLEVEL_OUTOF10: 4
PAINLEVEL_OUTOF10: 3

## 2024-01-03 ASSESSMENT — PAIN DESCRIPTION - LOCATION
LOCATION: LEG

## 2024-01-03 ASSESSMENT — PAIN DESCRIPTION - DESCRIPTORS
DESCRIPTORS: ACHING

## 2024-01-03 NOTE — PLAN OF CARE
Problem: Discharge Planning  Goal: Discharge to home or other facility with appropriate resources  1/3/2024 1845 by Josephine Yusuf II, LPN  Outcome: Progressing  Flowsheets (Taken 1/3/2024 0831)  Discharge to home or other facility with appropriate resources: Identify barriers to discharge with patient and caregiver     Problem: Discharge Planning  Goal: Discharge to home or other facility with appropriate resources  1/3/2024 1452 by Josephine Yusuf II, LPN  Outcome: Progressing     Problem: Safety - Adult  Goal: Free from fall injury  1/3/2024 1845 by Josephine Yusuf II, LPN  Outcome: Progressing  Flowsheets (Taken 1/3/2024 1446)  Free From Fall Injury:   Instruct family/caregiver on patient safety   Based on caregiver fall risk screen, instruct family/caregiver to ask for assistance with transferring infant if caregiver noted to have fall risk factors     Problem: Safety - Adult  Goal: Free from fall injury  1/3/2024 1452 by Josephine Yusuf II, LPN  Outcome: Progressing     Problem: Pain  Goal: Verbalizes/displays adequate comfort level or baseline comfort level  1/3/2024 1845 by Josephine Yusuf II, LPN  Outcome: Progressing  Flowsheets (Taken 1/3/2024 1446)  Verbalizes/displays adequate comfort level or baseline comfort level:   Encourage patient to monitor pain and request assistance   Assess pain using appropriate pain scale   Administer analgesics based on type and severity of pain and evaluate response     Problem: Chronic Conditions and Co-morbidities  Goal: Patient's chronic conditions and co-morbidity symptoms are monitored and maintained or improved  1/3/2024 1845 by Josephine Yusuf II, LPN  Outcome: Progressing  Flowsheets (Taken 1/3/2024 0831)  Care Plan - Patient's Chronic Conditions and Co-Morbidity Symptoms are Monitored and Maintained or Improved: Monitor and assess patient's chronic conditions and comorbid symptoms for stability, deterioration, or improvement     Problem: Chronic Conditions and

## 2024-01-03 NOTE — PROGRESS NOTES
Walker  Activity: To/from bathroom  Assist Level: Contact guard assistance  Functional Mobility Comments: Verbal cues for hand placement and safety with increased time due to LLE pain    Assessment  Activity Tolerance  Activity Tolerance: Patient limited by fatigue, Patient limited by pain, Patient Tolerated treatment well  Assessment  Performance deficits / Impairments: Decreased ADL status, Decreased functional mobility , Decreased safe awareness, Decreased endurance, Decreased balance, Decreased high-level IADLs  Treatment Diagnosis: Impaired self care status  Prognosis: Good  Decision Making: Medium Complexity  Discharge Recommendations: Home with assist PRN, Continue to assess pending progress    Patient Education  Education  Education Given To: Patient, Family  Education Provided: Role of Therapy, Plan of Care, Safety, ADL Function, Mobility Training, Transfer Training  Education Provided Comments: Pt educated on use of call light to ask for assistance when getting up due to fall risk. Pt verbalized Good understanding and was able to return demo use of call light for assistance  Education Method: Verbal, Demonstration  Barriers to Learning: None  Education Outcome: Verbalized understanding, Demonstrated understanding, Continued education needed    OT Equipment Recommendations  Other: TBD    Safety Devices  Type of Devices: Left in chair, Heels elevated for pressure relief, Call light within reach, All fall risk precautions in place       Goals  Patient Goals   Patient goals : \"Get back to doing what I normally do everyday.\"  Short Term Goals  Time Frame for Short Term Goals: By 4-5 days  Short Term Goal 1: Pt will complete lower body dressing/bathing with SBA and Good safety with use of AE as needed  Short Term Goal 2: Pt will complete functional transfers/mobility during self care tasks with Supervision and Good safety with use of least restrictive device  Short Term Goal 3: Pt will tolerate standing 5+  1510   OT Individual Minutes   Time In 1056 1301 1510   Time Out 1208 1336 1519   Minutes 72 35 9   Time Code Minutes    Timed Code Treatment Minutes 57 Minutes 35 Minutes 9 Minutes       Electronically signed by ELDA Smith on 1/3/24 at 5:02 PM EST

## 2024-01-03 NOTE — PROGRESS NOTES
Comprehensive Nutrition Assessment    Type and Reason for Visit:  Consult (Eval and treat)    Nutrition Recommendations/Plan:   Continue current diet.      Malnutrition Assessment:  Malnutrition Status:  No malnutrition (01/03/24 1608)    Context:  Acute Illness     Findings of the 6 clinical characteristics of malnutrition:  Energy Intake:  No significant decrease in energy intake  Weight Loss:  No significant weight loss     Body Fat Loss:  No significant body fat loss     Muscle Mass Loss:  No significant muscle mass loss    Fluid Accumulation:  No significant fluid accumulation     Strength:  Not Performed    Nutrition Assessment:    patient admission is related to UTI and debility. Eating well, consuming % of meals. No chewing or swallowing problem, no nausea, vomiting, or diarrhea. No recent weight loss. Will continue current diet.    Nutrition Related Findings:    Trace edema BLE. Bowel sounds active. Labs and meds reviewed. POC glucose 231 Wound Type: None       Current Nutrition Intake & Therapies:    Average Meal Intake: 51-75%, %  Average Supplements Intake: None Ordered  ADULT DIET; Regular    Anthropometric Measures:  Height: 144.8 cm (4' 9\")  Ideal Body Weight (IBW): 85 lbs (39 kg)    Admission Body Weight: 72 kg (158 lb 11.7 oz)  Current Body Weight: 72 kg (158 lb 11.7 oz), 186.7 % IBW. Weight Source: Stated  Current BMI (kg/m2): 34.3        Weight Adjustment For: No Adjustment                 BMI Categories: Obese Class 1 (BMI 30.0-34.9)    Estimated Daily Nutrient Needs:  Energy Requirements Based On: Formula  Weight Used for Energy Requirements: Admission  Energy (kcal/day): 1370 kcal based on MSJ and 1.3 factor  Weight Used for Protein Requirements: Ideal  Protein (g/day): 74 gm based on 1.9 gm/kg/ideal  Method Used for Fluid Requirements: 1 ml/kcal    Nutrition Diagnosis:   Predicted inadequate energy intake related to acute injury/trauma as evidenced by lab values, other

## 2024-01-03 NOTE — PATIENT CARE CONFERENCE
[facility] Acute Inpatient Rehabilitation  TEAM CONFERENCE NOTE  Date: 24  Patient Name: Komal Crawford       Room: 2633/2633-01  MRN: [id number]       : 1940  (83 y.o.)     Gender: female     Referring Practitioner: Dr Johnson     PRINCIPAL DIAGNOSIS: Debility    NURSING    Bladder Continence  Not Applicable - Device in Use (Indwelling Catheter, Condom Catheter or Ostomy) Patient self caths every 4-6 hours and has for 20 years  Bowel Continence Always Continent  but has frequent constipation.  Had a colostomy and a reversal done years ago  patient states a colostomy is planned for March or April this year and will be permanent    Date of Last BM: 1/3/2024 X2    Bladder/Bowel Program Interventions: Both Bowel & Bladder Program In Place     Wounds/Incisions/Ulcers: No skin issues identified    Pain Control: Patient's pain currently controlled and pain regimen effective as ordered    Pain Medication Regimen Usage Pattern: MAR reviewed and pain medications are being used at the following frequency (Specify Medication, # Doses Administered on average per day, identified patterns of use - for example: time of day, prior to activity/therapy)  Tramadol 50mg ordered Q6hrs  patient has taken the last two nights and has helped with sleep  does not want to take the Oxycodone states it makes her too \"loopy\"    Fall Risk:  Falling star program initiated    Medication Education Program: Patient able to manage medications and being educated by nursing    Discharge Preparation Patient/Responsible Party Education In Progress:   Fingerstick Glucose Monitoring and Insulin Management and Urinary Catheter Management    Nursing specific communication for TEAM: No additional information identified requiring communication at this time    PHYSICAL THERAPY    Bed Mobility:        Rolling to Left: Minimal assistance (Pt in extreme pain when rolled onto left side. pt prefers to long sit in bed and than bring her legs to  humor, Good insight into deficits, has necessary DME at home, and Knowledge about rehab  Barriers to the achievement of predicted outcomes: Pain, Decreased endurance, Lower extremity weakness, Medical complications, Medication managment, and Neurogenic bladder and chronic constipation    Functional Goals at discharge:  Predicted Outcome: Home with familyPATIENT'S LEVEL OF ASSISTANCE: Modified independence  Discharge therapy goals:  PT: Long Term Goals  Time Frame for Long Term Goals : By DC  Long Term Goal 1: Pt able to perform car transfers SBA, with use of step stool to step into the vehicle.  Long Term Goal 2: Pt to ambulate distance of 100 to 150 ft, mod-I on level surfaces, to demonstrate improved gait speed to 0.25 to 0.38 m/sec for community access.  Long Term Goal 3: Pt able to go up and down 4 steps with UE support, CGA  Long Term Goal 4: Pt able to tolerate WB L LE for atleast 2 to 4 minutes to mange management task as needed to return home  Long Term Goal 5: Pt able to perform 5 X sit<>stand with B UE use in < 25 seconds to improve transition movements during mobility.  OT:Long Term Goals  Time Frame for Long Term Goals : By discharge  Long Term Goal 1: Pt will complete BADLs with Mod I and Good safety with use of AE as needed  Long Term Goal 2: Pt will complete functional transfers/mobility during self care tasks with Mod I and Good safety with use of least restrictive device  Long Term Goal 3: Pt will tolerate standing 10+ minutes during functional activity of choice with Good safety  Long Term Goal 4: Pt will complete simple meal prep/light house keeping task with Supervision and Good safety  Long Term Goal 5: Pt will demonstrated increased FMC/ strength with L UE during self care tasks as evident by 5# improvement in  strength and 5 second improvement on 9 hole peg test  Long Term Goal 6: Pt will verbalize/demonstrate Good understanding of home safety/fall prevention strategies to increase

## 2024-01-03 NOTE — PROGRESS NOTES
Physical Therapy  Facility/Department: [facility] ACUTE REHAB  Rehabilitation Physical Therapy Initial Assessment    NAME: Komal Crawford  : 1940 (83 y.o.)  MRN: [id number]  CODE STATUS: Full Code    Date of Service: 1/3/24      Past Medical History:   Diagnosis Date    Age-related cognitive decline     Ankle pain     Left ankle fracture in ortho boat.    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    Caffeine use     2 coffee/day, 1-2 tea per week    Cerebral artery occlusion with cerebral infarction (HCC)     Chronic back pain     Chronic ITP (idiopathic thrombocytopenia) (HCC)     CVA (cerebral infarction) ,     balance issues, short term memory loss    Diabetic gastroparesis (HCC)     Diverticular disease     GERD (gastroesophageal reflux disease)     Hiatal hernia     Hip fracture (HCC)     History of blood transfusion     History of decreased platelet count     Hyperlipemia     Hypertension     Internal hemorrhoid     Macular degeneration of left eye     S/P laser treatment    Nausea and vomiting     Neuropathy     Osteoarthritis     Ringing in ears     Self-catheterizes urinary bladder     6-7 times daily    TIA (transient ischemic attack) 2000    x1    Tubular adenoma     colon polyps    Type II or unspecified type diabetes mellitus without mention of complication, not stated as uncontrolled     Urinary incontinence     frequent UTI s/p infection, surgical dilatation lead to incontinence    Wears dentures     full on top, partial on bottom    Wears glasses      Past Surgical History:   Procedure Laterality Date    APPENDECTOMY      BLADDER SURGERY      bladder stimulator    BLADDER SUSPENSION      multiple    CARDIAC CATHETERIZATION  2008    no stents    CARDIOVASCULAR STRESS TEST  2014    CATARACT REMOVAL WITH IMPLANT Left 2017    Raffoalhaji/Sulaiman    CATARACT REMOVAL WITH

## 2024-01-03 NOTE — H&P
Physical Medicine & Rehabilitation History and Physical  [facility] Acute Rehabilitation Unit     Primary care provider: Sabiha Bedolla MD     Chief Complaint and Reason for Rehabilitation Admission:   ADL and Mobility deficits secondary to Debility secondary to UTI and L hip OA     History of Present Illness:  Komal Crawford  is a 83 y.o. right-handed female admitted to the [place] Acute Rehabiliation unit on 1/2/2024.  She was originally admitted to [facility] on 12/28/2023.       Patient admitted with worsening hip and leg pain with impaired ambulation. CT scan showed moderate osteoarthritis with no acute changes. U/A was positive - patient has known history of straight cathing for neurogenic bladder and is on chronic macrobid. Internal medicine started her on ciprofloxacin empirically for possible UTI.     She is currently requiring assistance for self-care activities and mobility prompting this admission.    Review of Systems:  CONSTITUTIONAL:  Denies fevers, chills, sweats or fatigue.  EYES:  Denies diplopia, blind spots, blurring.  HEENT:  Denies hearing loss, trouble chewing or swallowing.  RESPIRATORY:  No wheezing, coughing, shortness of breath.  CARDIOVASCULAR:  Denies chest pain, palpitations, lightheadedness.   GASTROINTESTINAL:  Denies heartburn, nausea, constipation, diarrhea, abdominal pain.  GENITOURINARY:  No urgency, frequency, incontinence, dysuria.  ENDOCRINE:  Denies hot or cold intolerance.  MUSCULOSKELETAL:  Reports focal joint pain worse in L hip and knee. Denies back pain, neck pain.  NEUROLOGICAL:  Denies focal numbness, tingling, balance loss, headache.  BEHAVIOR/PSYCH:  Denies depression, anxiety, memory loss, insomnia.  SKIN:  No ulcers, rash, bruises.      Premorbid function:  Modified Independent    Current Function:  PT:    Restrictions:  Restrictions/Precautions  Restrictions/Precautions: Fall Risk, Weight Bearing  Required Braces or Orthoses?: No  Implants  left hip.  Right hip prosthesis..  The remaining joint spaces appear well maintained. Vascular calcifications are seen compatible with atherosclerotic disease.. Dystrophic calcifications overlying the inferior pubic region     No acute bony abnormalities are noted        Physical Exam:  BP (!) 118/53   Pulse 58   Temp 98.1 °F (36.7 °C) (Oral)   Resp 18   Ht 1.448 m (4' 9\")   Wt 72 kg (158 lb 11.7 oz)   SpO2 99%   BMI 34.35 kg/m²     GEN: Well developed, well nourished, no acute distress.  HEENT: NCAT, PERRL, EOMI, mucous membranes pink and moist.  RESP: Lungs clear to auscultation. No rales or rhonchi. Respirations WNL and unlabored.  CV: bradycardic rate regular rhythm. No murmurs, rubs, or gallops.  ABD: soft, non-distended, non-tender. BS+ and equal.  NEURO: A&O x 3. Sensation intact to light touch. DTRs 2+  MSK: Functional ROM. Muscle tone and bulk are normal bilaterally. Strength 4+/5 key muscles BUEs and RLE. 4/5 key muscles L hip flexion, 4+/5 L knee extension  EXT: No calf tenderness to palpation or edema BLEs.  SKIN: Warm dry and intact with good turgor.  PSYCH: Mood WNL. Affect WNL. Appropriately interactive.      Principal Diagnosis/plan:  The patient is a 83 y.o. year old with ADL and Mobility deficits secondary to Debility secondary to UTI and L hip OA    She will require close medical management and monitoring for the comorbidities listed below.     She will benefit from intensive interdisciplinary therapies and rehab nursing care and is appropriate for inpatient rehabilitation.  The post admission physician evaluation (MANUEL) is consistent with the pre-admission assessment.  See above findings to reflect the elements required in the MANUEL.  Patient's admitting condition is consistent with the findings of the preadmission assessment by the rehabilitation admissions coordinator.    Diagnoses/plan:    Debility secondary to UTI:  PT/OT for gait, mobility, strengthening, endurance, ADLs, and self

## 2024-01-03 NOTE — PROGRESS NOTES
Physical Therapy  Facility/Department: [facility] ACUTE REHAB  Rehabilitation Physical Therapy Treatment Note    NAME: Komal Crawford  : 1940 (83 y.o.)  MRN: [id number]  CODE STATUS: Full Code    Date of Service: 1/3/24       Restrictions:  Restrictions/Precautions: Fall Risk, Weight Bearing  Position Activity Restriction  Hip Precautions: Posterior hip precautions (R ISHAN 2023  by Dr Bonner)     SUBJECTIVE  Subjective  Subjective: Pt seated  in WC in therapy gym, pleasantly agreeable ot PT tx after finishing with OT tx.  Pain: Pt has no c/o  of pain during tx.        OBJECTIVE  Cognition  Overall Cognitive Status: Exceptions  Arousal/Alertness: Appropriate responses to stimuli  Following Commands: Follows multistep commands with increased time  Attention Span: Attends with cues to redirect  Safety Judgement: Decreased awareness of need for safety  Problem Solving: Assistance required to generate solutions;Assistance required to correct errors made  Insights: Fully aware of deficits  Initiation: Requires cues for some  Sequencing: Requires cues for some  Orientation  Overall Orientation Status: Within Functional Limits    Functional Mobility  Balance  Sitting Balance: Supervision  Standing Balance: Contact guard assistance  Transfers  Surface: Wheelchair;To chair with arms  Additional Factors: With handrails;Hand placement cues  Device: Walker  Sit to Stand  Assistance Level: Contact guard assist  Skilled Clinical Factors: VC's provided for handplacement  Stand to Sit  Assistance Level: Contact guard assist  Skilled Clinical Factors: VC's for controlled decent.  Bed To/From Chair  Technique: Stand step  Assistance Level: Contact guard assist  Skilled Clinical Factors: VC for pivoting on non-surgical side with correction in direction upon attempt.  Stand Pivot  Assistance Level: Contact guard assist      Environmental Mobility  Ambulation  Surface: Level surface  Device: Rolling walker  Distance: 65ft  Activity:

## 2024-01-03 NOTE — PROGRESS NOTES
Pt  states she was sitting in recliner stood up and reached over for her comb on bed . Felt her left knee giving out then lowered herself to the floor while  resting elbows on bed. Pt denies pain with movement, no injuries noted. Dr. Johnson notified.

## 2024-01-03 NOTE — PLAN OF CARE
Problem: Discharge Planning  Goal: Discharge to home or other facility with appropriate resources  1/3/2024 1446 by Josephine Yusuf II, LPN  Outcome: Progressing  Flowsheets (Taken 1/3/2024 0831)  Discharge to home or other facility with appropriate resources: Identify barriers to discharge with patient and caregiver     Problem: Safety - Adult  Goal: Free from fall injury  1/3/2024 1446 by Josephine Yusuf II, LPN  Outcome: Progressing  Flowsheets (Taken 1/3/2024 1446)  Free From Fall Injury:   Instruct family/caregiver on patient safety   Based on caregiver fall risk screen, instruct family/caregiver to ask for assistance with transferring infant if caregiver noted to have fall risk factors     Problem: Pain  Goal: Verbalizes/displays adequate comfort level or baseline comfort level  1/3/2024 1446 by Josephine Yusuf II, LPN  Outcome: Progressing  Flowsheets (Taken 1/3/2024 1446)  Verbalizes/displays adequate comfort level or baseline comfort level:   Encourage patient to monitor pain and request assistance   Assess pain using appropriate pain scale   Administer analgesics based on type and severity of pain and evaluate response     Problem: Chronic Conditions and Co-morbidities  Goal: Patient's chronic conditions and co-morbidity symptoms are monitored and maintained or improved  Outcome: Progressing  Flowsheets (Taken 1/3/2024 0831)  Care Plan - Patient's Chronic Conditions and Co-Morbidity Symptoms are Monitored and Maintained or Improved: Monitor and assess patient's chronic conditions and comorbid symptoms for stability, deterioration, or improvement

## 2024-01-03 NOTE — PLAN OF CARE
Problem: Discharge Planning  Goal: Discharge to home or other facility with appropriate resources  1/3/2024 0437 by Cathy Greenfield, RN  Outcome: Progressing     Problem: Safety - Adult  Goal: Free from fall injury  1/3/2024 0437 by Cathy Greenfield, RN  Outcome: Progressing     Problem: Pain  Goal: Verbalizes/displays adequate comfort level or baseline comfort level  1/3/2024 0437 by Cathy Greenfield, RN  Outcome: Progressing

## 2024-01-04 LAB
GLUCOSE BLD-MCNC: 118 MG/DL (ref 65–105)
GLUCOSE BLD-MCNC: 187 MG/DL (ref 65–105)
GLUCOSE BLD-MCNC: 231 MG/DL (ref 65–105)

## 2024-01-04 PROCEDURE — 6360000002 HC RX W HCPCS: Performed by: INTERNAL MEDICINE

## 2024-01-04 PROCEDURE — 6370000000 HC RX 637 (ALT 250 FOR IP): Performed by: PHYSICAL MEDICINE & REHABILITATION

## 2024-01-04 PROCEDURE — 1180000000 HC REHAB R&B

## 2024-01-04 PROCEDURE — 97535 SELF CARE MNGMENT TRAINING: CPT

## 2024-01-04 PROCEDURE — 99223 1ST HOSP IP/OBS HIGH 75: CPT | Performed by: INTERNAL MEDICINE

## 2024-01-04 PROCEDURE — 82947 ASSAY GLUCOSE BLOOD QUANT: CPT

## 2024-01-04 PROCEDURE — 97110 THERAPEUTIC EXERCISES: CPT

## 2024-01-04 PROCEDURE — 6370000000 HC RX 637 (ALT 250 FOR IP): Performed by: INTERNAL MEDICINE

## 2024-01-04 PROCEDURE — 97530 THERAPEUTIC ACTIVITIES: CPT

## 2024-01-04 PROCEDURE — 99233 SBSQ HOSP IP/OBS HIGH 50: CPT | Performed by: PHYSICAL MEDICINE & REHABILITATION

## 2024-01-04 PROCEDURE — 97116 GAIT TRAINING THERAPY: CPT

## 2024-01-04 RX ADMIN — ROPINIROLE HYDROCHLORIDE 4 MG: 1 TABLET, FILM COATED ORAL at 22:00

## 2024-01-04 RX ADMIN — ISOSORBIDE MONONITRATE 30 MG: 30 TABLET, EXTENDED RELEASE ORAL at 08:50

## 2024-01-04 RX ADMIN — ACETAMINOPHEN 650 MG: 325 TABLET ORAL at 08:50

## 2024-01-04 RX ADMIN — PRIMIDONE 50 MG: 50 TABLET ORAL at 22:03

## 2024-01-04 RX ADMIN — GLIPIZIDE 2.5 MG: 5 TABLET ORAL at 16:34

## 2024-01-04 RX ADMIN — ATORVASTATIN CALCIUM 10 MG: 10 TABLET, FILM COATED ORAL at 22:00

## 2024-01-04 RX ADMIN — ASPIRIN 81 MG: 81 TABLET, COATED ORAL at 08:50

## 2024-01-04 RX ADMIN — GLIPIZIDE 7.5 MG: 5 TABLET ORAL at 06:16

## 2024-01-04 RX ADMIN — TRAMADOL HYDROCHLORIDE 50 MG: 50 TABLET, COATED ORAL at 06:09

## 2024-01-04 RX ADMIN — ENOXAPARIN SODIUM 40 MG: 100 INJECTION SUBCUTANEOUS at 22:00

## 2024-01-04 RX ADMIN — AMLODIPINE BESYLATE 2.5 MG: 2.5 TABLET ORAL at 08:50

## 2024-01-04 RX ADMIN — NITROFURANTOIN MONOHYDRATE/MACROCRYSTALS 100 MG: 75; 25 CAPSULE ORAL at 22:03

## 2024-01-04 RX ADMIN — RANOLAZINE 500 MG: 500 TABLET, EXTENDED RELEASE ORAL at 08:52

## 2024-01-04 RX ADMIN — TRAMADOL HYDROCHLORIDE 50 MG: 50 TABLET, COATED ORAL at 22:00

## 2024-01-04 RX ADMIN — PRIMIDONE 50 MG: 50 TABLET ORAL at 08:52

## 2024-01-04 RX ADMIN — RANOLAZINE 500 MG: 500 TABLET, EXTENDED RELEASE ORAL at 22:02

## 2024-01-04 RX ADMIN — METOPROLOL SUCCINATE 12.5 MG: 25 TABLET, EXTENDED RELEASE ORAL at 08:50

## 2024-01-04 RX ADMIN — PANTOPRAZOLE SODIUM 40 MG: 40 TABLET, DELAYED RELEASE ORAL at 06:09

## 2024-01-04 RX ADMIN — NITROFURANTOIN MONOHYDRATE/MACROCRYSTALS 100 MG: 75; 25 CAPSULE ORAL at 08:52

## 2024-01-04 RX ADMIN — POLYETHYLENE GLYCOL 3350 17 G: 17 POWDER, FOR SOLUTION ORAL at 08:51

## 2024-01-04 RX ADMIN — CLOPIDOGREL BISULFATE 75 MG: 75 TABLET ORAL at 08:50

## 2024-01-04 ASSESSMENT — PAIN DESCRIPTION - ORIENTATION: ORIENTATION: LEFT

## 2024-01-04 ASSESSMENT — PAIN DESCRIPTION - LOCATION
LOCATION: HIP
LOCATION: LEG

## 2024-01-04 ASSESSMENT — PAIN DESCRIPTION - DESCRIPTORS
DESCRIPTORS: ACHING
DESCRIPTORS: ACHING

## 2024-01-04 ASSESSMENT — PAIN SCALES - GENERAL
PAINLEVEL_OUTOF10: 2
PAINLEVEL_OUTOF10: 8
PAINLEVEL_OUTOF10: 6

## 2024-01-04 NOTE — PROGRESS NOTES
[facility]   Acute Rehabilitation Occupational Therapy Daily Treatment Note    Date: 24  Patient Name: Komal Crawford       Room: 2633/2633-01  MRN: [id number]  Account: [id number]   : 1940  (83 y.o.) Gender: female       Referring Practitioner: Sakshi Johnson MD  Diagnosis: Debility secondary to UTI, L hip OA pain  Additional Pertinent Hx: Komal Crawford is a 83 y.o.  female admitted to [facility] on 2023. Patient admitted with worsening hip and leg pain with impaired ambulation. CT scan showed moderate osteoarthritis with no acute changes. U/A was positive - patient has known history of straight cathing for neurogenic bladder and is on chronic macrobid. Internal medicine started her on ciprofloxacin empirically for possible UTI. Patient reports that her pain is mildly improved today. She has worsened pain when she attempts to lie flat or perform L hip extension. Flexion does not exacerbate her pain. She is declining use of oxycodone but is using Tylenol for pain. 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[facility]), Caffeine use, Cerebral artery occlusion with cerebral infarction ([facility]), Chronic back pain, Chronic ITP (idiopathic thrombocytopenia) ([facility]), CVA (cerebral infarction), Diabetic gastroparesis ([facility]), Diverticular disease, GERD (gastroesophageal reflux disease), Hiatal hernia, Hip fracture ([facility]), History of blood transfusion, History of decreased platelet count, Hyperlipemia, Hypertension, Internal hemorrhoid, Macular degeneration of left eye, Nausea and vomiting, Neuropathy, Osteoarthritis, Ringing in ears, Self-catheterizes urinary bladder, TIA (transient ischemic attack), Tubular adenoma, Type II or unspecified type diabetes mellitus without mention of complication, not  Home management training         01/04/24 0903 01/04/24 1610   OT Individual Minutes   Time In 0903 1435   Time Out 1002 1510   Minutes 59 35         Electronically signed by BRIANNA Gould on 1/4/24 at 4:11 PM EST

## 2024-01-04 NOTE — PROGRESS NOTES
Attempted to see patient 3 times, in the room.  Will evaluate in am.     Discussed with nursing staff, the patient has no other concerns apart from a mechanical fall this morning.

## 2024-01-04 NOTE — PROGRESS NOTES
01/04/24 1100   Encounter Summary   Encounter Overview/Reason  Initial Encounter;Spiritual/Emotional Needs   Service Provided For: Patient   Referral/Consult From: Rounding   Support System Spouse;Family members   Last Encounter  01/04/24   Complexity of Encounter Low   Spiritual/Emotional needs   Type Spiritual Support   Assessment/Intervention/Outcome   Assessment Calm   Intervention Active listening;Discussed illness injury and it’s impact;Discussed belief system/Orthodox practices/zaida;Explored/Affirmed feelings, thoughts, concerns;Nurtured Hope;Prayer (assurance of)/[place];Read/Provided Scripture;Sustaining Presence/Ministry of presence   Outcome Coping;Engaged in conversation;Expressed feelings, needs, and concerns;Expressed Gratitude;Receptive

## 2024-01-04 NOTE — PROGRESS NOTES
New order noted for Lantus 10 units this evening   spoke with patient and she is refusing insulin does not take insulin routinely only on oral hypoglycemic meds. States that the prednisone is causing the jump in her BS.  Patient resting quietly, will continue to monitor

## 2024-01-04 NOTE — PROGRESS NOTES
Physical Therapy  Facility/Department: [facility] ACUTE REHAB  Rehabilitation Physical Therapy     NAME: Komal Crawford  : 1940 (83 y.o.)  MRN: [id number]  CODE STATUS: Full Code    Date of Service: 24      Past Medical History:   Diagnosis Date    Age-related cognitive decline     Ankle pain     Left ankle fracture in ortho boat.    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    Caffeine use     2 coffee/day, 1-2 tea per week    Cerebral artery occlusion with cerebral infarction (HCC)     Chronic back pain     Chronic ITP (idiopathic thrombocytopenia) (HCC)     CVA (cerebral infarction) ,     balance issues, short term memory loss    Diabetic gastroparesis (HCC)     Diverticular disease     GERD (gastroesophageal reflux disease)     Hiatal hernia     Hip fracture (HCC)     History of blood transfusion     History of decreased platelet count     Hyperlipemia     Hypertension     Internal hemorrhoid     Macular degeneration of left eye     S/P laser treatment    Nausea and vomiting     Neuropathy     Osteoarthritis     Ringing in ears     Self-catheterizes urinary bladder     6-7 times daily    TIA (transient ischemic attack) 2000    x1    Tubular adenoma     colon polyps    Type II or unspecified type diabetes mellitus without mention of complication, not stated as uncontrolled     Urinary incontinence     frequent UTI s/p infection, surgical dilatation lead to incontinence    Wears dentures     full on top, partial on bottom    Wears glasses      Past Surgical History:   Procedure Laterality Date    APPENDECTOMY      BLADDER SURGERY      bladder stimulator    BLADDER SUSPENSION      multiple    CARDIAC CATHETERIZATION      no stents    CARDIOVASCULAR STRESS TEST  2014    CATARACT REMOVAL WITH IMPLANT Left 2017    Tyree/Sulaiman    CATARACT REMOVAL WITH IMPLANT Right 2017  4\"  Rails: Bilateral  Assistance: Maximum assistance  Comment: patient buckled during stair training and able to support self with UE support and therapist use of gait belt to prevent fall and no  touch down of knees.    PT Exercises  Exercise Treatment: L LE ankle pumps, Hip Abd/Add, Heelslides, SAQ all SBA 10 reps.  A/AROM Exercises: Standing 3 way hip 2 x 10  Resistive Exercises: Seated B LE ex's x 15 with #1.5 and  LGTB  Exercise Equipment: Nustep 10 minutes L5    ASSESSMENT       Activity Tolerance  Activity Tolerance: Patient limited by pain  Activity Tolerance Comments: left knee pain       GOALS  Patient Goals   Patient Goals : Decrease pain L LE so she can walk  Short Term Goals  Time Frame for Short Term Goals: 5 to 7 days  Short Term Goal 1: Supine<>sit independently  Short Term Goal 2: Sit<>stand and pivot with rolling walker, mod-I  Short Term Goal 3: Pt to ambulate with rolling walker/Rollator distance of 100 ft atleast SBA on level as well as incline surfaces  Short Term Goal 4: Decrease pain to 5/10 with mobility /ex's to tolerate therapy session 3hrs/day  Short Term Goal 5: Progress pt to perform 6\"curb steps with Rw at CGA  Long Term Goals  Time Frame for Long Term Goals : By DC  Long Term Goal 1: Pt able to perform car transfers SBA, with use of step stool to step into the vehicle.  Long Term Goal 2: Pt to ambulate distance of 100 to 150 ft, mod-I on level surfaces, to demonstrate improved gait speed to 0.25 to 0.38 m/sec for community access.  Long Term Goal 3: Pt able to go up and down 4 steps with UE support, CGA  Long Term Goal 4: Pt able to tolerate WB L LE for atleast 2 to 4 minutes to mange management task as needed to return home  Long Term Goal 5: Pt able to perform 5 X sit<>stand with B UE use in < 25 seconds to improve transition movements during mobility.    PLAN OF CARE  Frequency: 1-2 treatment sessions per day, 5-7 days per week  Safety Devices  Type of Devices: Left in chair;Call

## 2024-01-04 NOTE — PROGRESS NOTES
Physical Medicine & Rehabilitation  Progress Note      Subjective:      83 year-old female with debility secondary to UTI and L hip OA. Patient is having problems with constipation, pain in the L knee, requiring pain medications, and urine retention requiring straight cath, and constipation requiring medication. She had a fall last night when L knee gave out with no injury. Reeducated that she must have assistance for any transfers and mobility.  She notes that tremor is worse in the L leg today causing some worsened weakness.     ROS:  Denies fevers, chills, sweats.  No chest pain, palpitations, lightheadedness.  Denies coughing, wheezing or shortness of breath.  Denies abdominal pain, nausea, diarrhea or constipation.  No new areas of joint pain.Worsened L knee pain.   Denies new areas of numbness or weakness.  Denies new anxiety or depression issues.  No new skin problems.    Rehabilitation:       PT:    Bed mobility  Rolling to Left: Minimal assistance (Pt in extreme pain when rolled onto left side. pt prefers to long sit in bed and than bring her legs to EOB.)  Rolling to Right: Stand by assistance  Supine to Sit: Minimal assistance (If log rolling technique use. Long sitting and than scooitn EOB is SBA.)  Sit to Supine: Stand by assistance  Scooting: Contact guard assistance  Bed Mobility Comments: Pt sitting in recliner chair at the start and end of session         Transfers  Sit to Stand: Stand by assistance  Stand to Sit: Stand by assistance  Bed to Chair: Stand by assistance (with rolling walker)  Comment: Pt reports pain at 6 to 7 /10 with mobility. Pt educated not to get up without staff assist at his time.  Transfers  Surface: Wheelchair, To chair with arms  Additional Factors: With handrails, Hand placement cues  Device: Walker  Sit to Stand  Assistance Level: Contact guard assist  Skilled Clinical Factors: VC's provided for handplacement  Stand to Sit  Assistance Level: Contact guard assist  Skilled

## 2024-01-04 NOTE — PLAN OF CARE
Individualized Plan of Care  [facility] Rehabilitation Unit    Rehabilitation physician: Dr Johnson     Admit Date: 1/2/2024     Rehabilitation Diagnosis: Debility     Rehabilitation impairments: self care, mobility, motor dysfunction, pain management, and safety    Factors facilitating achievement of predicted outcomes: Family support, Motivated, Cooperative, Pleasant, and Has homemaker services  Barriers to the achievement of predicted outcomes: Pain, Limited safety awareness, Decreased endurance, Lower extremity weakness, Medical complications, and Medication managment    Patient Goals: Improve independence with mobility, Increase overall strength and endurance, Increase independence with activities of daily living, Increase self-awareness, Increase safety awareness, Integrate appropriate pain management plan, Assure adequate nutritional option for discharge, and Provide appropriate patient and family education      NURSING:  Nursing goals for Komal Crawford while on the rehabilitation unit will include:  Adequate number of bowel movements, Urinate with no urinary retention >300ml in bladder, Maintain O2 SATs at an acceptable level during stay, Effective pain management while on the rehabilitation unit, Establish adequate pain control plan for discharge, Absence of skin breakdown while on the rehabilitation unit, Avoidance of any hospital acquired infections, Freedom from injury during hospitalization, and Complete education with patient/family with understanding demonstrated regarding disease process and resultant impairment     In order to achieve these goals, nursing interventions may include bowel/bladder training, education for medical assistive devices, medication education, O2 saturation management, energy conservation, stress management techniques, fall prevention, alarms protocol, seating and positioning, skin/wound care, pressure relief instruction, dressing changes, infection protection, DVT

## 2024-01-04 NOTE — PLAN OF CARE
Problem: Discharge Planning  Goal: Discharge to home or other facility with appropriate resources  1/4/2024 1552 by Lyubov Grimes LPN  Outcome: Progressing  Flowsheets (Taken 1/4/2024 0849)  Discharge to home or other facility with appropriate resources:   Identify barriers to discharge with patient and caregiver   Arrange for needed discharge resources and transportation as appropriate   Identify discharge learning needs (meds, wound care, etc)     Problem: Safety - Adult  Goal: Free from fall injury  1/4/2024 1552 by Lyubov Grimes LPN  Outcome: Progressing  Flowsheets (Taken 1/4/2024 1550)  Free From Fall Injury: Instruct family/caregiver on patient safety     Problem: Pain  Goal: Verbalizes/displays adequate comfort level or baseline comfort level  1/4/2024 1552 by Lyubov Grimes LPN  Outcome: Progressing     Problem: Chronic Conditions and Co-morbidities  Goal: Patient's chronic conditions and co-morbidity symptoms are monitored and maintained or improved  1/4/2024 1552 by Lyubov Grimes LPN  Outcome: Progressing  Flowsheets (Taken 1/4/2024 0849)  Care Plan - Patient's Chronic Conditions and Co-Morbidity Symptoms are Monitored and Maintained or Improved:   Monitor and assess patient's chronic conditions and comorbid symptoms for stability, deterioration, or improvement   Collaborate with multidisciplinary team to address chronic and comorbid conditions and prevent exacerbation or deterioration   Update acute care plan with appropriate goals if chronic or comorbid symptoms are exacerbated and prevent overall improvement and discharge     Problem: Nutrition Deficit:  Goal: Optimize nutritional status  1/4/2024 1552 by Lyubov Grimes LPN  Outcome: Progressing

## 2024-01-05 LAB
GLUCOSE BLD-MCNC: 154 MG/DL (ref 65–105)
GLUCOSE BLD-MCNC: 158 MG/DL (ref 65–105)
GLUCOSE BLD-MCNC: 204 MG/DL (ref 65–105)
GLUCOSE BLD-MCNC: 248 MG/DL (ref 65–105)

## 2024-01-05 PROCEDURE — 6370000000 HC RX 637 (ALT 250 FOR IP): Performed by: INTERNAL MEDICINE

## 2024-01-05 PROCEDURE — 97530 THERAPEUTIC ACTIVITIES: CPT

## 2024-01-05 PROCEDURE — 82947 ASSAY GLUCOSE BLOOD QUANT: CPT

## 2024-01-05 PROCEDURE — 1180000000 HC REHAB R&B

## 2024-01-05 PROCEDURE — 99232 SBSQ HOSP IP/OBS MODERATE 35: CPT | Performed by: PHYSICAL MEDICINE & REHABILITATION

## 2024-01-05 PROCEDURE — 51701 INSERT BLADDER CATHETER: CPT

## 2024-01-05 PROCEDURE — 97150 GROUP THERAPEUTIC PROCEDURES: CPT

## 2024-01-05 PROCEDURE — 6360000002 HC RX W HCPCS: Performed by: INTERNAL MEDICINE

## 2024-01-05 PROCEDURE — 97110 THERAPEUTIC EXERCISES: CPT

## 2024-01-05 PROCEDURE — 6370000000 HC RX 637 (ALT 250 FOR IP): Performed by: PHYSICAL MEDICINE & REHABILITATION

## 2024-01-05 PROCEDURE — 97116 GAIT TRAINING THERAPY: CPT

## 2024-01-05 PROCEDURE — 97535 SELF CARE MNGMENT TRAINING: CPT

## 2024-01-05 RX ORDER — NITROFURANTOIN MACROCRYSTALS 100 MG/1
100 CAPSULE ORAL EVERY 12 HOURS SCHEDULED
Status: DISCONTINUED | OUTPATIENT
Start: 2024-01-05 | End: 2024-01-09 | Stop reason: HOSPADM

## 2024-01-05 RX ADMIN — ATORVASTATIN CALCIUM 10 MG: 10 TABLET, FILM COATED ORAL at 20:38

## 2024-01-05 RX ADMIN — TRAMADOL HYDROCHLORIDE 50 MG: 50 TABLET, COATED ORAL at 12:58

## 2024-01-05 RX ADMIN — NITROFURANTOIN MACROCRYSTALS 100 MG: 100 CAPSULE ORAL at 20:40

## 2024-01-05 RX ADMIN — ISOSORBIDE MONONITRATE 30 MG: 30 TABLET, EXTENDED RELEASE ORAL at 09:05

## 2024-01-05 RX ADMIN — GLIPIZIDE 2.5 MG: 5 TABLET ORAL at 16:48

## 2024-01-05 RX ADMIN — RANOLAZINE 500 MG: 500 TABLET, EXTENDED RELEASE ORAL at 08:56

## 2024-01-05 RX ADMIN — METOPROLOL SUCCINATE 12.5 MG: 25 TABLET, EXTENDED RELEASE ORAL at 08:54

## 2024-01-05 RX ADMIN — ENOXAPARIN SODIUM 40 MG: 100 INJECTION SUBCUTANEOUS at 20:38

## 2024-01-05 RX ADMIN — ROPINIROLE HYDROCHLORIDE 4 MG: 1 TABLET, FILM COATED ORAL at 20:38

## 2024-01-05 RX ADMIN — PRIMIDONE 50 MG: 50 TABLET ORAL at 20:40

## 2024-01-05 RX ADMIN — GLIPIZIDE 7.5 MG: 5 TABLET ORAL at 06:41

## 2024-01-05 RX ADMIN — CLOPIDOGREL BISULFATE 75 MG: 75 TABLET ORAL at 09:01

## 2024-01-05 RX ADMIN — NITROFURANTOIN MONOHYDRATE/MACROCRYSTALS 100 MG: 75; 25 CAPSULE ORAL at 09:00

## 2024-01-05 RX ADMIN — AMLODIPINE BESYLATE 2.5 MG: 2.5 TABLET ORAL at 08:54

## 2024-01-05 RX ADMIN — PANTOPRAZOLE SODIUM 40 MG: 40 TABLET, DELAYED RELEASE ORAL at 06:41

## 2024-01-05 RX ADMIN — CYANOCOBALAMIN TAB 1000 MCG 1000 MCG: 1000 TAB at 08:54

## 2024-01-05 RX ADMIN — RANOLAZINE 500 MG: 500 TABLET, EXTENDED RELEASE ORAL at 20:39

## 2024-01-05 RX ADMIN — NITROFURANTOIN MACROCRYSTALS 100 MG: 100 CAPSULE ORAL at 09:00

## 2024-01-05 RX ADMIN — ASPIRIN 81 MG: 81 TABLET, COATED ORAL at 08:55

## 2024-01-05 RX ADMIN — PRIMIDONE 50 MG: 50 TABLET ORAL at 08:56

## 2024-01-05 RX ADMIN — POLYETHYLENE GLYCOL 3350 17 G: 17 POWDER, FOR SOLUTION ORAL at 08:53

## 2024-01-05 RX ADMIN — TRAMADOL HYDROCHLORIDE 50 MG: 50 TABLET, COATED ORAL at 06:49

## 2024-01-05 RX ADMIN — INSULIN LISPRO 2 UNITS: 100 INJECTION, SOLUTION INTRAVENOUS; SUBCUTANEOUS at 16:46

## 2024-01-05 ASSESSMENT — PAIN DESCRIPTION - LOCATION
LOCATION: LEG;KNEE

## 2024-01-05 ASSESSMENT — PAIN DESCRIPTION - ORIENTATION
ORIENTATION: LEFT

## 2024-01-05 ASSESSMENT — PAIN SCALES - GENERAL
PAINLEVEL_OUTOF10: 8
PAINLEVEL_OUTOF10: 9
PAINLEVEL_OUTOF10: 2

## 2024-01-05 ASSESSMENT — PAIN DESCRIPTION - DESCRIPTORS
DESCRIPTORS: ACHING
DESCRIPTORS: ACHING

## 2024-01-05 NOTE — PROGRESS NOTES
[facility]   Acute Rehabilitation Occupational Therapy Daily Treatment Note    Date: 24  Patient Name: Komal Crawford       Room: 2633/2633-01  MRN: [id number]  Account: [id number]   : 1940  (83 y.o.) Gender: female       Referring Practitioner: Sakshi Johnson MD  Diagnosis: Debility secondary to UTI, L hip OA pain  Additional Pertinent Hx: Komal Crawford is a 83 y.o.  female admitted to [facility] on 2023. Patient admitted with worsening hip and leg pain with impaired ambulation. CT scan showed moderate osteoarthritis with no acute changes. U/A was positive - patient has known history of straight cathing for neurogenic bladder and is on chronic macrobid. Internal medicine started her on ciprofloxacin empirically for possible UTI. Patient reports that her pain is mildly improved today. She has worsened pain when she attempts to lie flat or perform L hip extension. Flexion does not exacerbate her pain. She is declining use of oxycodone but is using Tylenol for pain. 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[facility]), Caffeine use, Cerebral artery occlusion with cerebral infarction ([facility]), Chronic back pain, Chronic ITP (idiopathic thrombocytopenia) ([facility]), CVA (cerebral infarction), Diabetic gastroparesis ([facility]), Diverticular disease, GERD (gastroesophageal reflux disease), Hiatal hernia, Hip fracture ([facility]), History of blood transfusion, History of decreased platelet count, Hyperlipemia, Hypertension, Internal hemorrhoid, Macular degeneration of left eye, Nausea and vomiting, Neuropathy, Osteoarthritis, Ringing in ears, Self-catheterizes urinary bladder, TIA (transient ischemic attack), Tubular adenoma, Type II or unspecified type diabetes mellitus without mention of complication, not  stated as uncontrolled, Urinary incontinence, Wears dentures, and Wears glasses.    Past Surgical History:   has a past surgical history that includes bladder suspension (2002); Hysterectomy (1969); Tonsillectomy (1978); Appendectomy (1962); Spine surgery (2010); colostomy (1986); Revision Colostomy (1986); Spine surgery (1990); Upper gastrointestinal endoscopy; Bladder surgery; Upper gastrointestinal endoscopy (05/05/2014); cardiovascular stress test (12/2014); Colon surgery; Total abdominal hysterectomy w/ bilateral salpingoophorectomy; fracture surgery (Right, 2011); fracture surgery (Left, 2001); fracture surgery (Left, 2013); Cardiac catheterization (2008); Revision total knee arthroplasty (Right, 10/27/2015); Colonoscopy (04/07/2016); Upper gastrointestinal endoscopy (04/07/2016); lumbar fusion (2017); Cystocopy (09/08/2017); Cataract removal with implant (Left, 11/07/2017); pr xcapsl ctrc rmvl insj io lens prosth w/o ecp (Left, 11/07/2017); Cataract removal with implant (Right, 11/28/2017); pr xcapsl ctrc rmvl insj io lens prosth w/o ecp (Right, 11/28/2017); Upper gastrointestinal endoscopy (N/A, 07/17/2018); joint replacement (Bilateral, 2000); hernia repair; Colonoscopy (N/A, 11/06/2019); Revision total knee arthroplasty (Left, 07/14/2020); Colonoscopy (11/06/2019); Coronary angioplasty with stent (08/04/2022); Total hip arthroplasty (Right, 11/6/2023); and hip surgery (Right, 11/6/2023).    Restrictions  Restrictions/Precautions  Restrictions/Precautions: Fall Risk, Weight Bearing  Required Braces or Orthoses?: No  Implants present? : Metal implants (B TKA; R ISHAN)     Position Activity Restriction  Hip Precautions: Posterior hip precautions (R ISHAN Nov 6th, 2023  by Dr Bonner)      Subjective  Subjective  Subjective: \"Oh I got to take a shower\" Pt is pleasant and motivated for therapy this AM.  Pain Assessment  Pain Assessment: 0-10  Pain Level: 8  Pain Location: Leg;Knee  Pain Orientation:

## 2024-01-05 NOTE — PROGRESS NOTES
Infectious Disease Physical Therapy  Facility/Department: [facility] ACUTE REHAB  Rehabilitation Physical Therapy Progress Note    NAME: Komal Crawford  : 1940 (83 y.o.)  MRN: [id number]  CODE STATUS: Full Code    Date of Service: 24      Past Medical History:   Diagnosis Date    Age-related cognitive decline     Ankle pain     Left ankle fracture in ortho boat.    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    Caffeine use     2 coffee/day, 1-2 tea per week    Cerebral artery occlusion with cerebral infarction (HCC)     Chronic back pain     Chronic ITP (idiopathic thrombocytopenia) (HCC)     CVA (cerebral infarction) ,     balance issues, short term memory loss    Diabetic gastroparesis (HCC)     Diverticular disease     GERD (gastroesophageal reflux disease)     Hiatal hernia     Hip fracture (HCC)     History of blood transfusion     History of decreased platelet count     Hyperlipemia     Hypertension     Internal hemorrhoid     Macular degeneration of left eye     S/P laser treatment    Nausea and vomiting     Neuropathy     Osteoarthritis     Ringing in ears     Self-catheterizes urinary bladder     6-7 times daily    TIA (transient ischemic attack) 2000    x1    Tubular adenoma     colon polyps    Type II or unspecified type diabetes mellitus without mention of complication, not stated as uncontrolled     Urinary incontinence     frequent UTI s/p infection, surgical dilatation lead to incontinence    Wears dentures     full on top, partial on bottom    Wears glasses      Past Surgical History:   Procedure Laterality Date    APPENDECTOMY      BLADDER SURGERY      bladder stimulator    BLADDER SUSPENSION      multiple    CARDIAC CATHETERIZATION  2008    no stents    CARDIOVASCULAR STRESS TEST  2014    CATARACT REMOVAL WITH IMPLANT Left 2017    Osbaldofoalhaji/Sulaiman    CATARACT REMOVAL WITH IMPLANT  to 150 ft, mod-I on level surfaces, to demonstrate improved gait speed to 0.25 to 0.38 m/sec for community access.  Long Term Goal 3: Pt able to go up and down 4 steps with UE support, CGA  Long Term Goal 4: Pt able to tolerate WB L LE for atleast 2 to 4 minutes to mange management task as needed to return home  Long Term Goal 5: Pt able to perform 5 X sit<>stand with B UE use in < 25 seconds to improve transition movements during mobility.    PLAN OF CARE  Physical Therapy Plan  General Plan:  minutes of therapy at least 5 out of 7 days a week (1.5 hr/day, 5 to 7 days/week.)  Specific Instructions for Next Treatment: Co-ordinate pain meds prior to therapy session.  Current Treatment Recommendations: Strengthening;Balance training;ROM;Functional mobility training;Transfer training;Endurance training;Gait training;Home exercise program;Safety education & training;Patient/Caregiver education & training;Therapeutic activities;Stair training;Neuromuscular re-education;Positioning  Safety Devices  Type of Devices: Left in chair;Call light within reach;Chair alarm in place;Gait belt    EDUCATION  Education  Education Given To: Patient  Education Provided: Mobility Training;Safety;Precautions;Plan of Care;Fall Prevention Strategies  Education Provided Comments: Pt edu on importance of rest breaks as needed to improve safety during mobilty, pt edu in energy conservation techniques. At the end of session pt provides teach back  of utilizing call light for assistance for mobility to improve safety. Pt informs writer of getting up at 6am to get herself dressed and walk around the room because she couldn't sit any longer and pt edu on needing to waiting for RN staff and therapies to perform ADLs and mobilty. Pt acknownledges and agrees, SLp Dwight also present during teachback.  Education Method: Demonstration;Verbal  Barriers to Learning: None  Education Outcome: Continued education needed    Therapy Time     01/05/24

## 2024-01-05 NOTE — CONSULTS
[facility]   IN-PATIENT SERVICE   [facility]    Consultation            Date:   1/4/2024  Patient name:  Komal Crawford  Date of admission:  1/2/2024  5:24 PM  MRN:   [id number]  Account:  [id number]  YOB: 1940  PCP:    Sabiha Bedolla MD  Room:   [address] [place] [address]  Code Status:    Full Code    Chief Complaint:     Medical management  History Obtained From:     patient    History of Present Illness:     The patient is a 83 y.o.  Non- / non  female who presents with No chief complaint on file.   and she is admitted to the rehab for therapy.    Apart from weakness in the left lower extremity the patient denies any concerns.  She has known history of moderate osteoarthritis on that side, reports she was asked to get a knee replacement.  She does not want to pursue this at this time.      Past Medical History:     Past Medical History:   Diagnosis Date    Age-related cognitive decline     Ankle pain     Left ankle fracture in ortho boat.    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HCC) 1990    left with urinary incontinenc post surgical    Caffeine use     2 coffee/day, 1-2 tea per week    Cerebral artery occlusion with cerebral infarction (HCC)     Chronic back pain     Chronic ITP (idiopathic thrombocytopenia) (HCC)     CVA (cerebral infarction) 2008, 2010    balance issues, short term memory loss    Diabetic gastroparesis (HCC)     Diverticular disease 1986    GERD (gastroesophageal reflux disease)     Hiatal hernia     Hip fracture (HCC)     History of blood transfusion     History of decreased platelet count     Hyperlipemia     Hypertension     Internal hemorrhoid     Macular degeneration of left eye 1980's    S/P laser treatment    Nausea and vomiting     Neuropathy     Osteoarthritis     Ringing in ears     Self-catheterizes urinary bladder     6-7 times daily    TIA (transient ischemic attack) 2000    x1    Tubular adenoma

## 2024-01-05 NOTE — INTERDISCIPLINARY ROUNDS
[facility] Acute Inpatient Rehabilitation  INTERDISCIPLINARY MEETING  Date: 24  Patient Name: Komal Crawford       Room: 2633/2633-01  MRN: [id number]       : 1940  (83 y.o.)     Gender: female     Patient's care team, including nursing, speech language pathologist, occupational therapist, and/or physical therapist, met to discuss patient's care needs. Any patient concerns, change in medical status, and current transfer/mobility status were identified at this time.     Any Additional Pertinent Information:   Not Applicable    Participating Team Members:  Nurse: Josephine Yusuf II, LPN    Occupational Therapist:  Edna Hammer OT   Physical Therapist: Zhne Amos PT

## 2024-01-05 NOTE — THERAPY DISCHARGE
[] [facility]  Rehabilitation Services  [address]  [place], [place] [address]  Phone [phone]  Fax [phone]    Physical Therapy Discharge Note    Date: 2024      Patient: Komal Crawford  : 1940  MRN: [id number]    Physician: Dr. Bonner   Insurance: MEDICARE PART A AND B (TRACK CAP); [employer] PPO  Medical Diagnosis: Z96.641 (ICD-10-CM): Presence of Right Artificial Hip Joint    Rehab Codes: R25 pain , M25.60 stiffness, R53.1 weakness  Onset Date: 23-date of replacement                 Next 's appt: 24: post op with Dr. Bonner  Visit# / total visits:                     Cancels/No Shows: 0/0  Date of initial visit: 23                       [x] Other: Patient was last seen in clinic 23. She missed appt due to being sick and having L hip pain. She then calls noting she was admitted and being discharged to rehab following. Due to being in a different level of care will close current episode and discharge. When patient returns to PT will require new referral and new evaluation.     Treatment Included:     [x] Therapeutic Exercise   94955  [] Iontophoresis: 4 mg/mL Dexamethasone Sodium Phosphate  mAmin  01092   [] Therapeutic Activity  71760 [] Vasopneumatic cold with compression  06402    [] Gait Training   05596 [] Ultrasound   97499   [] Neuromuscular Re-education  77926 [] Electrical Stimulation Unattended  00890   [] Manual Therapy  34996 [] Electrical Stimulation Attended  23751   [x] Instruction in HEP  [] Lumbar/Cervical Traction  83821   [x] Aquatic Therapy   20435 [] Cold/hotpack    [] Massage   78192      [] Dry Needling, 1 or 2 muscles  42561   [] Biofeedback, first 15 minutes     [] Biofeedback, additional 15 minutes    [] Dry Needling, 3 or more muscles                  If you have any questions or concerns regarding this patient's care, please contact us.   Thank you for your referral.      Electronically

## 2024-01-05 NOTE — PLAN OF CARE
Problem: Discharge Planning  Goal: Discharge to home or other facility with appropriate resources  1/5/2024 0355 by Cathy Greenfield RN  Outcome: Progressing     Problem: Safety - Adult  Goal: Free from fall injury  1/5/2024 0355 by Cathy Greenfield RN  Outcome: Progressing     Problem: Pain  Goal: Verbalizes/displays adequate comfort level or baseline comfort level  1/5/2024 0355 by Cathy Greenfield RN  Outcome: Progressing     Problem: Chronic Conditions and Co-morbidities  Goal: Patient's chronic conditions and co-morbidity symptoms are monitored and maintained or improved  1/5/2024 0355 by Cathy Greenfield RN  Outcome: Progressing     Problem: Nutrition Deficit:  Goal: Optimize nutritional status  1/5/2024 0355 by Cathy Greenfield RN  Outcome: Progressing

## 2024-01-05 NOTE — PROGRESS NOTES
Physical Medicine & Rehabilitation  Progress Note      Subjective:      83 year-old female with debility secondary to UTI and L hip OA. Patient is observed in physical therapy performing strengthening exercises L leg today. She notes continued aching pain worse medial aspect L knee. In general, pain is improving with alternating Tylenol, Tramadol and prn ice. She is progressing with therapy. No new issues with sleep, appetite, bowel, or bladder.      ROS:  Denies fevers, chills, sweats.  No chest pain, palpitations, lightheadedness.  Denies coughing, wheezing or shortness of breath.  Denies abdominal pain, nausea, diarrhea or constipation.  No new areas of joint pain.Worsened L knee pain.   Denies new areas of numbness or weakness.  Denies new anxiety or depression issues.  No new skin problems.    Rehabilitation:       PT:    Bed mobility  Rolling to Left: Minimal assistance (Pt in extreme pain when rolled onto left side. pt prefers to long sit in bed and than bring her legs to EOB.)  Rolling to Right: Stand by assistance  Supine to Sit: Minimal assistance (If log rolling technique use. Long sitting and than scooitn EOB is SBA.)  Sit to Supine: Stand by assistance  Scooting: Contact guard assistance  Bed Mobility Comments: Pt sitting in recliner chair at the start and end of session         Transfers  Sit to Stand: Contact guard assistance  Stand to Sit: Contact guard assistance  Bed to Chair: Contact guard assistance  Comment: patient reporting pain 7/10 today. CGA for safety today.  Transfers  Surface: Wheelchair, To chair with arms  Additional Factors: With handrails, Hand placement cues  Device: Walker  Sit to Stand  Assistance Level: Contact guard assist  Skilled Clinical Factors: VC's provided for handplacement  Stand to Sit  Assistance Level: Contact guard assist  Skilled Clinical Factors: VC's for controlled decent.  Bed To/From Chair  Technique: Stand step  Assistance Level: Contact guard assist  Skilled  Q8H PRN **OR** [DISCONTINUED] ondansetron (ZOFRAN) injection 4 mg, 4 mg, IntraVENous, Q6H PRN  polyethylene glycol (GLYCOLAX) packet 17 g, 17 g, Oral, Daily PRN  acetaminophen (TYLENOL) tablet 650 mg, 650 mg, Oral, Q6H PRN **OR** acetaminophen (TYLENOL) suppository 650 mg, 650 mg, Rectal, Q6H PRN  insulin lispro (HUMALOG) injection vial 0-8 Units, 0-8 Units, SubCUTAneous, TID WC  insulin lispro (HUMALOG) injection vial 0-4 Units, 0-4 Units, SubCUTAneous, Nightly  glucose chewable tablet 16 g, 4 tablet, Oral, PRN  dextrose bolus 10% 125 mL, 125 mL, IntraVENous, PRN **OR** dextrose bolus 10% 250 mL, 250 mL, IntraVENous, PRN  glucagon injection 1 mg, 1 mg, SubCUTAneous, PRN  dextrose 10 % infusion, , IntraVENous, Continuous PRN  oxyCODONE (ROXICODONE) immediate release tablet 5 mg, 5 mg, Oral, Q4H PRN  lidocaine 4 % external patch 1 patch, 1 patch, TransDERmal, Daily  polyethylene glycol (GLYCOLAX) packet 17 g, 17 g, Oral, Daily  senna (SENOKOT) tablet 17.2 mg, 2 tablet, Oral, Daily PRN  Facility-Administered Medications Ordered in Other Encounters: 0.9 % sodium chloride infusion 250 mL, 250 mL, IntraVENous, Once      Impression/Plan:   Impaired ADLs, gait, and mobility due to:      Debility secondary to UTI:  PT/OT for gait, mobility, strengthening, endurance, ADLs, and self care. Completed ciprofloxacin course in acute hospital.  Moderate OA L hip: has Tramadol prn pain. Completed 5 day course prednisone in acute hospital. Outpatient ortho follow up. Continue alternating Tylenol, Tramadol, using ice prn.   L knee MCL sprain: For outpatient follow up with Dr. Bonner.  Hx neurogenic bladder: performs ISC at home, was reportedly on chronic macrobid - she has been on this for years for prevention as per Dr. Arteaga and Dr. Jean. Resumed 1/3/24.   Borderline hypertension: on amlodipine, metoprolol, Imdur  DM II: on sliding scale  CAD: s/p stent. On ASA, plavix, ranolazine, atorvastatin  Chronic idiopathic

## 2024-01-05 NOTE — PROGRESS NOTES
[facility]   ACUTE REHABILITATION  LUNCH GROUP NOTE     Date: 24  Patient Name: Komal Crawford      Room: 2633/2633-01        MRN: [id number]    : 1940  (83 y.o.)  Gender: female   Referring Practitioner: Dr Johnson  Diagnosis: Debility secondary to UTI, L hip OA pain    Patient participated in the OT Program at  in the Dining Room on this date.  They were in group for 54 minutes    The patient  initiated conversation. OT practitioner facilitated therapeutic conversation regarding topics of leisure activity.    They expressed  enjoyment in the setting and people.      The patient's pain was monitored as they were  able to tolerate Group activity.   Pain level was 0/10.      They were Setup / SBA  with the activity.      BRIANNA Azul

## 2024-01-06 ENCOUNTER — APPOINTMENT (OUTPATIENT)
Dept: GENERAL RADIOLOGY | Age: 84
DRG: 554 | End: 2024-01-06
Attending: PHYSICAL MEDICINE & REHABILITATION
Payer: MEDICARE

## 2024-01-06 LAB
GLUCOSE BLD-MCNC: 121 MG/DL (ref 65–105)
GLUCOSE BLD-MCNC: 138 MG/DL (ref 65–105)
GLUCOSE BLD-MCNC: 259 MG/DL (ref 65–105)
GLUCOSE BLD-MCNC: 275 MG/DL (ref 65–105)

## 2024-01-06 PROCEDURE — 6370000000 HC RX 637 (ALT 250 FOR IP): Performed by: PHYSICAL MEDICINE & REHABILITATION

## 2024-01-06 PROCEDURE — 97110 THERAPEUTIC EXERCISES: CPT

## 2024-01-06 PROCEDURE — 97530 THERAPEUTIC ACTIVITIES: CPT

## 2024-01-06 PROCEDURE — 6370000000 HC RX 637 (ALT 250 FOR IP): Performed by: INTERNAL MEDICINE

## 2024-01-06 PROCEDURE — 82947 ASSAY GLUCOSE BLOOD QUANT: CPT

## 2024-01-06 PROCEDURE — 99232 SBSQ HOSP IP/OBS MODERATE 35: CPT | Performed by: INTERNAL MEDICINE

## 2024-01-06 PROCEDURE — 51701 INSERT BLADDER CATHETER: CPT

## 2024-01-06 PROCEDURE — 1180000000 HC REHAB R&B

## 2024-01-06 PROCEDURE — 97535 SELF CARE MNGMENT TRAINING: CPT

## 2024-01-06 PROCEDURE — 97116 GAIT TRAINING THERAPY: CPT

## 2024-01-06 PROCEDURE — 6360000002 HC RX W HCPCS: Performed by: INTERNAL MEDICINE

## 2024-01-06 RX ADMIN — NITROFURANTOIN MACROCRYSTALS 100 MG: 100 CAPSULE ORAL at 08:09

## 2024-01-06 RX ADMIN — ACETAMINOPHEN 650 MG: 325 TABLET ORAL at 09:46

## 2024-01-06 RX ADMIN — CLOPIDOGREL BISULFATE 75 MG: 75 TABLET ORAL at 08:06

## 2024-01-06 RX ADMIN — POLYETHYLENE GLYCOL 3350 17 G: 17 POWDER, FOR SOLUTION ORAL at 08:07

## 2024-01-06 RX ADMIN — NITROFURANTOIN MACROCRYSTALS 100 MG: 100 CAPSULE ORAL at 20:04

## 2024-01-06 RX ADMIN — TRAMADOL HYDROCHLORIDE 50 MG: 50 TABLET, COATED ORAL at 08:07

## 2024-01-06 RX ADMIN — INSULIN LISPRO 4 UNITS: 100 INJECTION, SOLUTION INTRAVENOUS; SUBCUTANEOUS at 16:47

## 2024-01-06 RX ADMIN — ISOSORBIDE MONONITRATE 30 MG: 30 TABLET, EXTENDED RELEASE ORAL at 08:06

## 2024-01-06 RX ADMIN — RANOLAZINE 500 MG: 500 TABLET, EXTENDED RELEASE ORAL at 08:09

## 2024-01-06 RX ADMIN — RANOLAZINE 500 MG: 500 TABLET, EXTENDED RELEASE ORAL at 20:04

## 2024-01-06 RX ADMIN — PRIMIDONE 50 MG: 50 TABLET ORAL at 20:04

## 2024-01-06 RX ADMIN — ENOXAPARIN SODIUM 40 MG: 100 INJECTION SUBCUTANEOUS at 20:02

## 2024-01-06 RX ADMIN — PANTOPRAZOLE SODIUM 40 MG: 40 TABLET, DELAYED RELEASE ORAL at 06:21

## 2024-01-06 RX ADMIN — ASPIRIN 81 MG: 81 TABLET, COATED ORAL at 08:06

## 2024-01-06 RX ADMIN — METOPROLOL SUCCINATE 12.5 MG: 25 TABLET, EXTENDED RELEASE ORAL at 08:06

## 2024-01-06 RX ADMIN — ATORVASTATIN CALCIUM 10 MG: 10 TABLET, FILM COATED ORAL at 20:02

## 2024-01-06 RX ADMIN — PRIMIDONE 50 MG: 50 TABLET ORAL at 08:09

## 2024-01-06 RX ADMIN — AMLODIPINE BESYLATE 2.5 MG: 2.5 TABLET ORAL at 08:06

## 2024-01-06 RX ADMIN — ROPINIROLE HYDROCHLORIDE 4 MG: 1 TABLET, FILM COATED ORAL at 20:02

## 2024-01-06 RX ADMIN — GLIPIZIDE 2.5 MG: 5 TABLET ORAL at 16:15

## 2024-01-06 RX ADMIN — GLIPIZIDE 7.5 MG: 5 TABLET ORAL at 06:22

## 2024-01-06 ASSESSMENT — PAIN DESCRIPTION - DESCRIPTORS: DESCRIPTORS: BURNING

## 2024-01-06 ASSESSMENT — PAIN DESCRIPTION - LOCATION
LOCATION: KNEE;LEG
LOCATION: HIP

## 2024-01-06 ASSESSMENT — PAIN SCALES - GENERAL
PAINLEVEL_OUTOF10: 8
PAINLEVEL_OUTOF10: 4
PAINLEVEL_OUTOF10: 3
PAINLEVEL_OUTOF10: 4

## 2024-01-06 ASSESSMENT — PAIN DESCRIPTION - ORIENTATION: ORIENTATION: LEFT

## 2024-01-06 NOTE — PROGRESS NOTES
[facility]   Acute Rehabilitation Occupational Therapy Daily Treatment Note    Date: 24  Patient Name: Komal Crawford       Room: 2633/2633-01  MRN: [id number]  Account: [id number]   : 1940  (83 y.o.) Gender: female       Referring Practitioner: Sakshi Johnson MD  Diagnosis: Debility secondary to UTI, L hip OA pain  Additional Pertinent Hx: Komal Crawford is a 83 y.o.  female admitted to [facility] on 2023. Patient admitted with worsening hip and leg pain with impaired ambulation. CT scan showed moderate osteoarthritis with no acute changes. U/A was positive - patient has known history of straight cathing for neurogenic bladder and is on chronic macrobid. Internal medicine started her on ciprofloxacin empirically for possible UTI. Patient reports that her pain is mildly improved today. She has worsened pain when she attempts to lie flat or perform L hip extension. Flexion does not exacerbate her pain. She is declining use of oxycodone but is using Tylenol for pain. 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[facility]), Caffeine use, Cerebral artery occlusion with cerebral infarction ([facility]), Chronic back pain, Chronic ITP (idiopathic thrombocytopenia) ([facility]), CVA (cerebral infarction), Diabetic gastroparesis ([facility]), Diverticular disease, GERD (gastroesophageal reflux disease), Hiatal hernia, Hip fracture ([facility]), History of blood transfusion, History of decreased platelet count, Hyperlipemia, Hypertension, Internal hemorrhoid, Macular degeneration of left eye, Nausea and vomiting, Neuropathy, Osteoarthritis, Ringing in ears, Self-catheterizes urinary bladder, TIA (transient ischemic attack), Tubular adenoma, Type II or unspecified type diabetes mellitus without mention of complication, not  noted                      Functional Mobility  Device: Rolling walker  Activity: To/From bathroom  Assistance Level: Stand by assist  Skilled Clinical Factors: close SBA with RW, slow but steady, no LOB noted                                Assessment  Assessment  Activity Tolerance: Patient limited by pain  Discharge Recommendations: Home with assist PRN;Continue to assess pending progress    Patient Education  Education  Education Given To: Patient  Education Provided: Plan of Care;Precautions;Safety;ADL Function;Mobility Training;Transfer Training;Equipment;Fall Prevention Strategies;DME/Home Modifications  Education Provided Comments: DC planning  Education Method: Demonstration;Verbal;Teach Back  Barriers to Learning: None  Education Outcome: Verbalized understanding;Demonstrated understanding;Continued education needed    OT Equipment Recommendations  Other: TBD    Safety Devices  Safety Devices in place: Yes  Type of devices: Nurse notified;Left in chair;Call light within reach;Chair alarm in place       Goals  Patient Goals   Patient goals : \"Get back to doing what I normally do everyday.\"  Short Term Goals  Time Frame for Short Term Goals: By 4-5 days  Short Term Goal 1: Pt will complete lower body dressing/bathing with SBA and Good safety with use of AE as needed  Short Term Goal 2: Pt will complete functional transfers/mobility during self care tasks with Supervision and Good safety with use of least restrictive device  Short Term Goal 3: Pt will tolerate standing 5+ minutes during functional activity of choice with Good safety  Short Term Goal 4: Pt will verbalize/demonstrate Good understanding of AE/adaptive strategies/DME to increase safety and independence with self care and mobility  Short Term Goal 5: Pt will verbalize/demonstrate 3 non-pharmaceutical pain management strategies to increase participation in daily activities and improve overall quality of life  Short Term Goal 6: Pt will participate

## 2024-01-06 NOTE — PROGRESS NOTES
Physical Medicine & Rehabilitation  Progress Note      Subjective:      83 year-old female with debility secondary to UTI and L hip OA. pain medial aspect L knee is improving with alternating Tylenol, Tramadol and prn ice. She is progressing with therapy. No new issues with sleep, appetite, bowel, or bladder.Last BM 2 days ago.      ROS:  Denies fevers, chills, sweats.  No chest pain, palpitations, lightheadedness.  Denies coughing, wheezing or shortness of breath.  Denies abdominal pain, nausea, diarrhea or constipation.  No new areas of joint pain.Worsened L knee pain.   Denies new areas of numbness or weakness.  Denies new anxiety or depression issues.  No new skin problems.    Rehabilitation:       PT:      Bed mobility  Bed Mobility Comments: Patient approached/departed seated in bedside recliner.  Transfers  Sit to Stand: Stand by assistance  Stand to Sit: Stand by assistance  Bed to Chair: Stand by assistance  Stand Pivot Transfers: Stand by assistance  Comment: transfers completed with RW     Balance  Posture: Fair  Sitting - Static: Good  Sitting - Dynamic: Fair;+  Standing - Static: Fair;+  Standing - Dynamic: Fair     Environmental Mobility  Ambulation  WB Status: No WB restricitions noted in the chart at this time.  Ambulation  Surface: Level tile  Device: Rolling Walker  Other Apparatus: Wheelchair follow (pulled by writer)  Assistance: Stand by assistance  Quality of Gait: Decreased tolerance to WB left LE due to pain. decreased step height left LE, at times shuffling noted left LE due to pain.  Gait Deviations: Decreased step length;Decreased step height  Distance: 127'  Comments: Per patient distance and quality of gait limited by pain.  More Ambulation?: No (deferred PM gait due to increased left knee pain and edema)     Stairs/Curb  Stairs?: Yes  # Steps : 5  Stairs Height: 6\" (and 4\")  Rails: Bilateral  Assistance: Minimal assistance  Comment: left knee buckling during descending of 6\" step with  Daily  rOPINIRole (REQUIP) tablet 4 mg, 4 mg, Oral, Nightly  insulin glargine (LANTUS) injection vial 10 Units, 10 Units, SubCUTAneous, Once  glipiZIDE (GLUCOTROL) tablet 2.5 mg, 2.5 mg, Oral, Daily before dinner  atorvastatin (LIPITOR) tablet 10 mg, 10 mg, Oral, Nightly  primidone (MYSOLINE) tablet 50 mg, 50 mg, Oral, BID  ranolazine (RANEXA) extended release tablet 500 mg, 500 mg, Oral, BID  vitamin B-12 (CYANOCOBALAMIN) tablet 1,000 mcg, 1,000 mcg, Oral, Weekly  potassium chloride (KLOR-CON M) extended release tablet 40 mEq, 40 mEq, Oral, PRN **OR** potassium bicarb-citric acid (EFFER-K) effervescent tablet 40 mEq, 40 mEq, Oral, PRN **OR** [DISCONTINUED] potassium chloride 10 mEq/100 mL IVPB (Peripheral Line), 10 mEq, IntraVENous, PRN  enoxaparin (LOVENOX) injection 40 mg, 40 mg, SubCUTAneous, Nightly  ondansetron (ZOFRAN-ODT) disintegrating tablet 4 mg, 4 mg, Oral, Q8H PRN **OR** [DISCONTINUED] ondansetron (ZOFRAN) injection 4 mg, 4 mg, IntraVENous, Q6H PRN  polyethylene glycol (GLYCOLAX) packet 17 g, 17 g, Oral, Daily PRN  acetaminophen (TYLENOL) tablet 650 mg, 650 mg, Oral, Q6H PRN **OR** acetaminophen (TYLENOL) suppository 650 mg, 650 mg, Rectal, Q6H PRN  insulin lispro (HUMALOG) injection vial 0-8 Units, 0-8 Units, SubCUTAneous, TID WC  insulin lispro (HUMALOG) injection vial 0-4 Units, 0-4 Units, SubCUTAneous, Nightly  glucose chewable tablet 16 g, 4 tablet, Oral, PRN  dextrose bolus 10% 125 mL, 125 mL, IntraVENous, PRN **OR** dextrose bolus 10% 250 mL, 250 mL, IntraVENous, PRN  glucagon injection 1 mg, 1 mg, SubCUTAneous, PRN  dextrose 10 % infusion, , IntraVENous, Continuous PRN  oxyCODONE (ROXICODONE) immediate release tablet 5 mg, 5 mg, Oral, Q4H PRN  lidocaine 4 % external patch 1 patch, 1 patch, TransDERmal, Daily  polyethylene glycol (GLYCOLAX) packet 17 g, 17 g, Oral, Daily  senna (SENOKOT) tablet 17.2 mg, 2 tablet, Oral, Daily PRN  Facility-Administered Medications Ordered in Other Encounters: 0.9

## 2024-01-06 NOTE — PLAN OF CARE
Problem: Discharge Planning  Goal: Discharge to home or other facility with appropriate resources  Outcome: Progressing  Flowsheets (Taken 1/6/2024 0825)  Discharge to home or other facility with appropriate resources:   Identify barriers to discharge with patient and caregiver   Arrange for needed discharge resources and transportation as appropriate   Identify discharge learning needs (meds, wound care, etc)     Problem: Safety - Adult  Goal: Free from fall injury  Outcome: Progressing  Flowsheets (Taken 1/6/2024 0827)  Free From Fall Injury: Instruct family/caregiver on patient safety     Problem: Pain  Goal: Verbalizes/displays adequate comfort level or baseline comfort level  Outcome: Progressing  Flowsheets (Taken 1/6/2024 0800)  Verbalizes/displays adequate comfort level or baseline comfort level:   Encourage patient to monitor pain and request assistance   Assess pain using appropriate pain scale   Administer analgesics based on type and severity of pain and evaluate response     Problem: Chronic Conditions and Co-morbidities  Goal: Patient's chronic conditions and co-morbidity symptoms are monitored and maintained or improved  Outcome: Progressing  Flowsheets (Taken 1/6/2024 0825)  Care Plan - Patient's Chronic Conditions and Co-Morbidity Symptoms are Monitored and Maintained or Improved:   Monitor and assess patient's chronic conditions and comorbid symptoms for stability, deterioration, or improvement   Collaborate with multidisciplinary team to address chronic and comorbid conditions and prevent exacerbation or deterioration   Update acute care plan with appropriate goals if chronic or comorbid symptoms are exacerbated and prevent overall improvement and discharge     Problem: Nutrition Deficit:  Goal: Optimize nutritional status  Outcome: Progressing

## 2024-01-06 NOTE — PLAN OF CARE
Problem: Discharge Planning  Goal: Discharge to home or other facility with appropriate resources  Outcome: Progressing  Flowsheets (Taken 1/4/2024 0849 by Lyubov Grimes LPN)  Discharge to home or other facility with appropriate resources:   Identify barriers to discharge with patient and caregiver   Arrange for needed discharge resources and transportation as appropriate   Identify discharge learning needs (meds, wound care, etc)     Problem: Safety - Adult  Goal: Free from fall injury  Outcome: Progressing  Flowsheets (Taken 1/4/2024 1550 by Lyubov Grimes LPN)  Free From Fall Injury: Instruct family/caregiver on patient safety     Problem: Pain  Goal: Verbalizes/displays adequate comfort level or baseline comfort level  Outcome: Progressing  Flowsheets (Taken 1/3/2024 1446)  Verbalizes/displays adequate comfort level or baseline comfort level:   Encourage patient to monitor pain and request assistance   Assess pain using appropriate pain scale   Administer analgesics based on type and severity of pain and evaluate response     Problem: Chronic Conditions and Co-morbidities  Goal: Patient's chronic conditions and co-morbidity symptoms are monitored and maintained or improved  Outcome: Progressing  Flowsheets (Taken 1/4/2024 0849 by Lyubov Grimes LPN)  Care Plan - Patient's Chronic Conditions and Co-Morbidity Symptoms are Monitored and Maintained or Improved:   Monitor and assess patient's chronic conditions and comorbid symptoms for stability, deterioration, or improvement   Collaborate with multidisciplinary team to address chronic and comorbid conditions and prevent exacerbation or deterioration   Update acute care plan with appropriate goals if chronic or comorbid symptoms are exacerbated and prevent overall improvement and discharge     Problem: Nutrition Deficit:  Goal: Optimize nutritional status  Outcome: Progressing  Flowsheets (Taken 1/3/2024 0595)  Nutrient intake appropriate for improving,  restoring, or maintaining nutritional needs:   Assess nutritional status and recommend course of action   Monitor oral intake, labs, and treatment plans   Recommend appropriate diets, oral nutritional supplements, and vitamin/mineral supplements

## 2024-01-06 NOTE — PROGRESS NOTES
Physical Therapy  Facility/Department: [facility] ACUTE REHAB  Rehabilitation Physical Therapy Progress Note    NAME: Komal Crawford  : 1940 (83 y.o.)  MRN: [id number]  CODE STATUS: Full Code    Date of Service: 24      Past Medical History:   Diagnosis Date    Age-related cognitive decline     Ankle pain     Left ankle fracture in ortho boat.    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    Caffeine use     2 coffee/day, 1-2 tea per week    Cerebral artery occlusion with cerebral infarction (HCC)     Chronic back pain     Chronic ITP (idiopathic thrombocytopenia) (HCC)     CVA (cerebral infarction) ,     balance issues, short term memory loss    Diabetic gastroparesis (HCC)     Diverticular disease     GERD (gastroesophageal reflux disease)     Hiatal hernia     Hip fracture (HCC)     History of blood transfusion     History of decreased platelet count     Hyperlipemia     Hypertension     Internal hemorrhoid     Macular degeneration of left eye     S/P laser treatment    Nausea and vomiting     Neuropathy     Osteoarthritis     Ringing in ears     Self-catheterizes urinary bladder     6-7 times daily    TIA (transient ischemic attack) 2000    x1    Tubular adenoma     colon polyps    Type II or unspecified type diabetes mellitus without mention of complication, not stated as uncontrolled     Urinary incontinence     frequent UTI s/p infection, surgical dilatation lead to incontinence    Wears dentures     full on top, partial on bottom    Wears glasses      Past Surgical History:   Procedure Laterality Date    APPENDECTOMY      BLADDER SURGERY      bladder stimulator    BLADDER SUSPENSION      multiple    CARDIAC CATHETERIZATION  2008    no stents    CARDIOVASCULAR STRESS TEST  2014    CATARACT REMOVAL WITH IMPLANT Left 2017    Osbaldofoalhaji/Sulaiman    CATARACT REMOVAL WITH IMPLANT

## 2024-01-06 NOTE — PROGRESS NOTES
[facility]   IN-PATIENT SERVICE   [facility]    Consultation            Date:   1/6/2024  Patient name:  Komal Crawford  Date of admission:  1/2/2024  5:24 PM  MRN:   [id number]  Account:  [id number]  YOB: 1940  PCP:    Sabiha Bedolla MD  Room:   [address] [place] [address]  Code Status:    Full Code    Chief Complaint:     Medical management  History Obtained From:     patient    History of Present Illness:     The patient is a 83 y.o.  Non- / non  female who presents with No chief complaint on file.   and she is admitted to the rehab for therapy.    Apart from weakness in the left lower extremity the patient denies any concerns.  She has known history of moderate osteoarthritis on that side, reports she was asked to get a knee replacement.  She does not want to pursue this at this time.      Past Medical History:     Past Medical History:   Diagnosis Date    Age-related cognitive decline     Ankle pain     Left ankle fracture in ortho boat.    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HCC) 1990    left with urinary incontinenc post surgical    Caffeine use     2 coffee/day, 1-2 tea per week    Cerebral artery occlusion with cerebral infarction (HCC)     Chronic back pain     Chronic ITP (idiopathic thrombocytopenia) (HCC)     CVA (cerebral infarction) 2008, 2010    balance issues, short term memory loss    Diabetic gastroparesis (HCC)     Diverticular disease 1986    GERD (gastroesophageal reflux disease)     Hiatal hernia     Hip fracture (HCC)     History of blood transfusion     History of decreased platelet count     Hyperlipemia     Hypertension     Internal hemorrhoid     Macular degeneration of left eye 1980's    S/P laser treatment    Nausea and vomiting     Neuropathy     Osteoarthritis     Ringing in ears     Self-catheterizes urinary bladder     6-7 times daily    TIA (transient ischemic attack) 2000    x1    Tubular adenoma   negative for swelling/edema   ALLERGIC/IMMUNOLOGIC:  negative for urticaria , itching  ENDOCRINE:  negative increase in drinking, increase in urination, hot or cold intolerance  MUSCULOSKELETAL: Pains as mentioned above  NEUROLOGICAL:  negative for headaches, dizziness, lightheadedness, numbness, pain, tingling extremities      Physical Exam:   BP (!) 115/53   Pulse 71   Temp 97.8 °F (36.6 °C) (Oral)   Resp 16   Ht 1.448 m (4' 9\")   Wt 72 kg (158 lb 11.7 oz)   SpO2 99%   BMI 34.35 kg/m²   Temp (24hrs), Av.4 °F (36.9 °C), Min:97.8 °F (36.6 °C), Max:99 °F (37.2 °C)    Recent Labs     24  19524  0700 24  1205 24  1614   POCGLU 248* 138* 121* 259*       Intake/Output Summary (Last 24 hours) at 2024 1836  Last data filed at 2024 0830  Gross per 24 hour   Intake 480 ml   Output 700 ml   Net -220 ml       General Appearance:  alert, well appearing, and in no acute distress  Mental status: oriented to person, place, and time with normal affect  Head:  normocephalic, atraumatic.  Eye: no icterus, redness, pupils equal and reactive, extraocular eye movements intact, conjunctiva clear  Mouth: mucous membranes moist  Neck: supple, no carotid bruits, thyroid not palpable  Lungs: Bilateral equal air entry, clear to ausculation, no wheezing, rales or rhonchi, normal effort  Cardiovascular: normal rate, regular rhythm, no murmur, gallop, rub.  Abdomen: Soft, nontender, nondistended, normal bowel sounds, no hepatomegaly or splenomegaly  Neurologic: Speech is intact no facial drooping, left lower extremity weaker as compared to the right.  Skin: No gross lesions, rashes, bruising or bleeding on exposed skin area  Extremities: Trace edema, peripheral pulses strong    Investigations:      Laboratory Testing:  Recent Results (from the past 24 hour(s))   POC Glucose Fingerstick    Collection Time: 24  7:57 PM   Result Value Ref Range    POC Glucose 248 (H) 65 - 105 mg/dL   POC Glucose

## 2024-01-07 ENCOUNTER — APPOINTMENT (OUTPATIENT)
Dept: GENERAL RADIOLOGY | Age: 84
DRG: 554 | End: 2024-01-07
Attending: PHYSICAL MEDICINE & REHABILITATION
Payer: MEDICARE

## 2024-01-07 LAB
GLUCOSE BLD-MCNC: 167 MG/DL (ref 65–105)
GLUCOSE BLD-MCNC: 168 MG/DL (ref 65–105)
GLUCOSE BLD-MCNC: 278 MG/DL (ref 65–105)

## 2024-01-07 PROCEDURE — 6360000002 HC RX W HCPCS: Performed by: INTERNAL MEDICINE

## 2024-01-07 PROCEDURE — 97110 THERAPEUTIC EXERCISES: CPT

## 2024-01-07 PROCEDURE — 82947 ASSAY GLUCOSE BLOOD QUANT: CPT

## 2024-01-07 PROCEDURE — 1180000000 HC REHAB R&B

## 2024-01-07 PROCEDURE — 6370000000 HC RX 637 (ALT 250 FOR IP): Performed by: PHYSICAL MEDICINE & REHABILITATION

## 2024-01-07 PROCEDURE — 97535 SELF CARE MNGMENT TRAINING: CPT

## 2024-01-07 PROCEDURE — 51701 INSERT BLADDER CATHETER: CPT

## 2024-01-07 PROCEDURE — 73562 X-RAY EXAM OF KNEE 3: CPT

## 2024-01-07 PROCEDURE — 6370000000 HC RX 637 (ALT 250 FOR IP): Performed by: INTERNAL MEDICINE

## 2024-01-07 PROCEDURE — 97530 THERAPEUTIC ACTIVITIES: CPT

## 2024-01-07 PROCEDURE — 99232 SBSQ HOSP IP/OBS MODERATE 35: CPT | Performed by: INTERNAL MEDICINE

## 2024-01-07 PROCEDURE — 97116 GAIT TRAINING THERAPY: CPT

## 2024-01-07 RX ADMIN — POLYETHYLENE GLYCOL 3350 17 G: 17 POWDER, FOR SOLUTION ORAL at 07:43

## 2024-01-07 RX ADMIN — RANOLAZINE 500 MG: 500 TABLET, EXTENDED RELEASE ORAL at 07:49

## 2024-01-07 RX ADMIN — ISOSORBIDE MONONITRATE 30 MG: 30 TABLET, EXTENDED RELEASE ORAL at 07:43

## 2024-01-07 RX ADMIN — OXYCODONE HYDROCHLORIDE 5 MG: 5 TABLET ORAL at 20:58

## 2024-01-07 RX ADMIN — AMLODIPINE BESYLATE 2.5 MG: 2.5 TABLET ORAL at 07:43

## 2024-01-07 RX ADMIN — PRIMIDONE 50 MG: 50 TABLET ORAL at 22:14

## 2024-01-07 RX ADMIN — ROPINIROLE HYDROCHLORIDE 4 MG: 1 TABLET, FILM COATED ORAL at 20:52

## 2024-01-07 RX ADMIN — TRAMADOL HYDROCHLORIDE 50 MG: 50 TABLET, COATED ORAL at 16:41

## 2024-01-07 RX ADMIN — ENOXAPARIN SODIUM 40 MG: 100 INJECTION SUBCUTANEOUS at 20:52

## 2024-01-07 RX ADMIN — PRIMIDONE 50 MG: 50 TABLET ORAL at 07:50

## 2024-01-07 RX ADMIN — TRAMADOL HYDROCHLORIDE 50 MG: 50 TABLET, COATED ORAL at 07:58

## 2024-01-07 RX ADMIN — NITROFURANTOIN MACROCRYSTALS 100 MG: 100 CAPSULE ORAL at 20:53

## 2024-01-07 RX ADMIN — ASPIRIN 81 MG: 81 TABLET, COATED ORAL at 07:43

## 2024-01-07 RX ADMIN — CLOPIDOGREL BISULFATE 75 MG: 75 TABLET ORAL at 07:43

## 2024-01-07 RX ADMIN — ATORVASTATIN CALCIUM 10 MG: 10 TABLET, FILM COATED ORAL at 20:52

## 2024-01-07 RX ADMIN — GLIPIZIDE 2.5 MG: 5 TABLET ORAL at 15:45

## 2024-01-07 RX ADMIN — NITROFURANTOIN MACROCRYSTALS 100 MG: 100 CAPSULE ORAL at 07:49

## 2024-01-07 RX ADMIN — METOPROLOL SUCCINATE 12.5 MG: 25 TABLET, EXTENDED RELEASE ORAL at 07:43

## 2024-01-07 RX ADMIN — INSULIN LISPRO 4 UNITS: 100 INJECTION, SOLUTION INTRAVENOUS; SUBCUTANEOUS at 16:26

## 2024-01-07 RX ADMIN — ACETAMINOPHEN 650 MG: 325 TABLET ORAL at 05:57

## 2024-01-07 RX ADMIN — RANOLAZINE 500 MG: 500 TABLET, EXTENDED RELEASE ORAL at 22:14

## 2024-01-07 RX ADMIN — PANTOPRAZOLE SODIUM 40 MG: 40 TABLET, DELAYED RELEASE ORAL at 05:41

## 2024-01-07 RX ADMIN — GLIPIZIDE 7.5 MG: 5 TABLET ORAL at 05:43

## 2024-01-07 ASSESSMENT — PAIN SCALES - GENERAL
PAINLEVEL_OUTOF10: 0
PAINLEVEL_OUTOF10: 10
PAINLEVEL_OUTOF10: 6
PAINLEVEL_OUTOF10: 8
PAINLEVEL_OUTOF10: 7

## 2024-01-07 ASSESSMENT — PAIN DESCRIPTION - ORIENTATION
ORIENTATION: LEFT
ORIENTATION: LEFT

## 2024-01-07 ASSESSMENT — PAIN DESCRIPTION - DESCRIPTORS
DESCRIPTORS: ACHING
DESCRIPTORS: BURNING

## 2024-01-07 ASSESSMENT — PAIN DESCRIPTION - LOCATION
LOCATION: KNEE
LOCATION: KNEE

## 2024-01-07 ASSESSMENT — PAIN - FUNCTIONAL ASSESSMENT: PAIN_FUNCTIONAL_ASSESSMENT: PREVENTS OR INTERFERES SOME ACTIVE ACTIVITIES AND ADLS

## 2024-01-07 NOTE — PROGRESS NOTES
clamshells, hamstring curls, heel/toe raises x10 reps x2 sets (weight omitted on left LE due to pain)  Resistive Exercises: seated right LE marches, LAQ, clamshells, hamstring curls, heel/toe raises with 2# weights and green tband x10 reps x2 sets  Circulation/Endurance Exercises: ankle pumps  Pressure Relief Exercises: self adjusts in seated position  Functional Mobility Circuit Training: transfers training from variable surface heights  Dynamic Sitting Balance Exercises: reaching outside NAKIA, weight shifting, psotural ex  Static Standing Balance Exercises: static standing @ RW ~4 min  Dynamic Standing Balance Exercises: reaching outisde NAKIA, weight shifitng, postural ex, stabilization ex  Postural Correction Exercises: vcs for forward gaze  Motor Control/Coordination: ceuing for proper gait sequencing for step-to pattern on stairs  Standing Open/Closed Kinetic Chain Exercises: standing left LE marches, 4 way hip, hamstring curls, heel/toe raises x20 reps each  Exercise Equipment: NuStep 15 min L3    ASSESSMENT  Vitals  O2 Device: None (Room air)    Activity Tolerance  Activity Tolerance: Patient limited by pain;Patient limited by endurance;Patient limited by fatigue  Activity Tolerance Comments: left knee pain    GOALS  Patient Goals   Patient Goals : Decrease pain L LE so she can walk  Short Term Goals  Time Frame for Short Term Goals: 5 to 7 days  Short Term Goal 1: Supine<>sit independently  Short Term Goal 2: Sit<>stand and pivot with rolling walker, mod-I  Short Term Goal 3: Pt to ambulate with rolling walker/Rollator distance of 100 ft atleast SBA on level as well as incline surfaces  Short Term Goal 4: Decrease pain to 5/10 with mobility /ex's to tolerate therapy session 3hrs/day  Short Term Goal 5: Progress pt to perform 6\"curb steps with Rw at [facility]  Long Term Goals  Time Frame for Long Term Goals : By DC  Long Term Goal 1: Pt able to perform car transfers SBA, with use of step stool to step into the  vehicle.  Long Term Goal 2: Pt to ambulate distance of 100 to 150 ft, mod-I on level surfaces, to demonstrate improved gait speed to 0.25 to 0.38 m/sec for community access.  Long Term Goal 3: Pt able to go up and down 4 steps with UE support, CGA  Long Term Goal 4: Pt able to tolerate WB L LE for atleast 2 to 4 minutes to mange management task as needed to return home  Long Term Goal 5: Pt able to perform 5 X sit<>stand with B UE use in < 25 seconds to improve transition movements during mobility.    PLAN OF CARE  Physical Therapy Plan  General Plan:  minutes of therapy at least 5 out of 7 days a week (1.5 hr/day, 5 to 7 days/week.)  Specific Instructions for Next Treatment: Co-ordinate pain meds prior to therapy session.  Current Treatment Recommendations: Strengthening;Balance training;ROM;Functional mobility training;Transfer training;Endurance training;Gait training;Home exercise program;Safety education & training;Patient/Caregiver education & training;Therapeutic activities;Stair training;Neuromuscular re-education;Positioning  Safety Devices  Type of Devices: Left in chair;Call light within reach;Chair alarm in place;Gait belt (after PM treatment patient left with DEWEY Renetta)    EDUCATION  Education  Education Given To: Patient  Education Provided: Mobility Training;Safety;Precautions;Plan of Care;Fall Prevention Strategies  Education Provided Comments: patient educated on resting elevating and icing left knee after therapy sessions  Education Method: Demonstration;Verbal  Barriers to Learning: None  Education Outcome: Continued education needed    Therapy Time     01/07/24 1107 01/07/24 1559   PT Individual Minutes   Time In 1107 1335   Time Out 1209 1405   Minutes 62 30         An Dejesus PTA, 01/07/24 at 4:00 PM

## 2024-01-07 NOTE — PROGRESS NOTES
Physical Medicine & Rehabilitation  Progress Note      Subjective:      83 year-old female with debility secondary to UTI and L hip OA. pain medial aspect L knee is improving ,will get an x ray L knee today with alternating Tylenol, Tramadol and prn ice. She is progressing with therapy. No new issues with sleep, appetite, bowel, or bladder.     ROS:  Denies fevers, chills, sweats.  No chest pain, palpitations, lightheadedness.  Denies coughing, wheezing or shortness of breath.  Denies abdominal pain, nausea, diarrhea or constipation.  No new areas of joint pain.Worsened L knee pain.   Denies new areas of numbness or weakness.  Denies new anxiety or depression issues.  No new skin problems.    Rehabilitation:       PT:      Functional Mobility  Bed mobility  Bed Mobility Comments: Patient approached/departed seated in bedside recliner.     Transfers  Sit to Stand: Contact guard assistance  Stand to Sit: Contact guard assistance  Stand Pivot Transfers: Contact guard assistance  Comment: transfers completed with RW and CGA for safety 2* left knee buckling from increased pain.     Balance  Posture: Fair  Sitting - Static: Good  Sitting - Dynamic: Fair;+  Standing - Static: Fair;+  Standing - Dynamic: Fair     Environmental Mobility  Ambulation  WB Status: No WB restricitions noted in the chart at this time.  Ambulation  Surface: Level tile  Device: Rolling Walker  Other Apparatus: Wheelchair follow (pulled by writer)  Assistance: Stand by assistance  Quality of Gait: Decreased tolerance to WB left LE due to pain. decreased step height left LE, at times shuffling noted left LE due to pain.  Gait Deviations: Decreased step length;Decreased step height  Distance: 127' 19' x2 reps 17'  Comments: Per patient distance and quality of gait limited by pain.  More Ambulation?: No (deferred PM gait due to increased left knee pain and edema)     Stairs/Curb  Stairs?: Yes  # Steps : 5  Stairs Height: 6\" (and 4\")  Rails:

## 2024-01-07 NOTE — PROGRESS NOTES
[facility]   Acute Rehabilitation Occupational Therapy Daily Treatment Note    Date: 24  Patient Name: Komal Crawford       Room: 2633/2633-01  MRN: [id number]  Account: [id number]   : 1940  (83 y.o.) Gender: female       Referring Practitioner: Sakshi Johnson MD  Diagnosis: Debility secondary to UTI, L hip OA pain  Additional Pertinent Hx: Komal Crawford is a 83 y.o.  female admitted to [facility] on 2023. Patient admitted with worsening hip and leg pain with impaired ambulation. CT scan showed moderate osteoarthritis with no acute changes. U/A was positive - patient has known history of straight cathing for neurogenic bladder and is on chronic macrobid. Internal medicine started her on ciprofloxacin empirically for possible UTI. Patient reports that her pain is mildly improved today. She has worsened pain when she attempts to lie flat or perform L hip extension. Flexion does not exacerbate her pain. She is declining use of oxycodone but is using Tylenol for pain. 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[facility]), Caffeine use, Cerebral artery occlusion with cerebral infarction ([facility]), Chronic back pain, Chronic ITP (idiopathic thrombocytopenia) ([facility]), CVA (cerebral infarction), Diabetic gastroparesis ([facility]), Diverticular disease, GERD (gastroesophageal reflux disease), Hiatal hernia, Hip fracture ([facility]), History of blood transfusion, History of decreased platelet count, Hyperlipemia, Hypertension, Internal hemorrhoid, Macular degeneration of left eye, Nausea and vomiting, Neuropathy, Osteoarthritis, Ringing in ears, Self-catheterizes urinary bladder, TIA (transient ischemic attack), Tubular adenoma, Type II or unspecified type diabetes mellitus without mention of complication, not  safety with use of least restrictive device  Long Term Goal 3: Pt will tolerate standing 10+ minutes during functional activity of choice with Good safety  Long Term Goal 4: Pt will complete simple meal prep/light house keeping task with Supervision and Good safety  Long Term Goal 5: Pt will demonstrated increased FMC/ strength with L UE during self care tasks as evident by 5# improvement in  strength and 5 second improvement on 9 hole peg test  Long Term Goal 6: Pt will verbalize/demonstrate Good understanding of home safety/fall prevention strategies to increase safety and independence with self care and mobility    Plan  Occupational Therapy Plan  Times Per Week: 5-7  Times Per Day: Twice a day  Current Treatment Recommendations: Self-Care / ADL, Strengthening, Balance training, Functional mobility training, Endurance training, Pain management, Safety education & training, Patient/Caregiver education & training, Equipment evaluation, education, & procurement, Home management training         01/07/24 0910 01/07/24 0943 01/07/24 1405   OT Individual Minutes   Time In 0910 0943 1405   Time Out 0936 1010 1440   Minutes 26 27 35   Minute Variance   Variance  --  2  --    Reason  --  Procedure  (x-ray in room for L knee [id number]-not billed time)  --               Electronically signed by Renetta REED on 1/7/24 at 4:44 PM EST

## 2024-01-07 NOTE — PLAN OF CARE
Problem: Discharge Planning  Goal: Discharge to home or other facility with appropriate resources  Outcome: Progressing  Flowsheets (Taken 1/6/2024 0825 by Lyubov Grimes LPN)  Discharge to home or other facility with appropriate resources:   Identify barriers to discharge with patient and caregiver   Arrange for needed discharge resources and transportation as appropriate   Identify discharge learning needs (meds, wound care, etc)     Problem: Safety - Adult  Goal: Free from fall injury  Outcome: Progressing  Flowsheets (Taken 1/6/2024 0827 by Lyubov Grimes LPN)  Free From Fall Injury: Instruct family/caregiver on patient safety     Problem: Pain  Goal: Verbalizes/displays adequate comfort level or baseline comfort level  Outcome: Progressing  Flowsheets (Taken 1/6/2024 0800 by Lyubov Grimes LPN)  Verbalizes/displays adequate comfort level or baseline comfort level:   Encourage patient to monitor pain and request assistance   Assess pain using appropriate pain scale   Administer analgesics based on type and severity of pain and evaluate response     Problem: Chronic Conditions and Co-morbidities  Goal: Patient's chronic conditions and co-morbidity symptoms are monitored and maintained or improved  Outcome: Progressing  Flowsheets (Taken 1/6/2024 0825 by Lyubov Grimes LPN)  Care Plan - Patient's Chronic Conditions and Co-Morbidity Symptoms are Monitored and Maintained or Improved:   Monitor and assess patient's chronic conditions and comorbid symptoms for stability, deterioration, or improvement   Collaborate with multidisciplinary team to address chronic and comorbid conditions and prevent exacerbation or deterioration   Update acute care plan with appropriate goals if chronic or comorbid symptoms are exacerbated and prevent overall improvement and discharge     Problem: Nutrition Deficit:  Goal: Optimize nutritional status  Outcome: Progressing  Flowsheets (Taken 1/3/2024 7255)  Nutrient intake  appropriate for improving, restoring, or maintaining nutritional needs:   Assess nutritional status and recommend course of action   Monitor oral intake, labs, and treatment plans   Recommend appropriate diets, oral nutritional supplements, and vitamin/mineral supplements     Problem: Skin/Tissue Integrity  Goal: Absence of new skin breakdown  Description: 1.  Monitor for areas of redness and/or skin breakdown  2.  Assess vascular access sites hourly  3.  Every 4-6 hours minimum:  Change oxygen saturation probe site  4.  Every 4-6 hours:  If on nasal continuous positive airway pressure, respiratory therapy assess nares and determine need for appliance change or resting period.  Outcome: Progressing

## 2024-01-08 LAB
GLUCOSE BLD-MCNC: 132 MG/DL (ref 65–105)
GLUCOSE BLD-MCNC: 138 MG/DL (ref 65–105)
GLUCOSE BLD-MCNC: 142 MG/DL (ref 65–105)
GLUCOSE BLD-MCNC: 263 MG/DL (ref 65–105)
GLUCOSE BLD-MCNC: 293 MG/DL (ref 65–105)

## 2024-01-08 PROCEDURE — 97110 THERAPEUTIC EXERCISES: CPT

## 2024-01-08 PROCEDURE — 82947 ASSAY GLUCOSE BLOOD QUANT: CPT

## 2024-01-08 PROCEDURE — 99232 SBSQ HOSP IP/OBS MODERATE 35: CPT | Performed by: INTERNAL MEDICINE

## 2024-01-08 PROCEDURE — 6370000000 HC RX 637 (ALT 250 FOR IP): Performed by: PHYSICAL MEDICINE & REHABILITATION

## 2024-01-08 PROCEDURE — 1180000000 HC REHAB R&B

## 2024-01-08 PROCEDURE — 6370000000 HC RX 637 (ALT 250 FOR IP): Performed by: INTERNAL MEDICINE

## 2024-01-08 PROCEDURE — 97535 SELF CARE MNGMENT TRAINING: CPT

## 2024-01-08 PROCEDURE — 97150 GROUP THERAPEUTIC PROCEDURES: CPT

## 2024-01-08 PROCEDURE — 6360000002 HC RX W HCPCS: Performed by: INTERNAL MEDICINE

## 2024-01-08 PROCEDURE — 99232 SBSQ HOSP IP/OBS MODERATE 35: CPT | Performed by: PHYSICAL MEDICINE & REHABILITATION

## 2024-01-08 PROCEDURE — 97530 THERAPEUTIC ACTIVITIES: CPT

## 2024-01-08 PROCEDURE — 97116 GAIT TRAINING THERAPY: CPT

## 2024-01-08 RX ADMIN — CLOPIDOGREL BISULFATE 75 MG: 75 TABLET ORAL at 09:07

## 2024-01-08 RX ADMIN — ROPINIROLE HYDROCHLORIDE 4 MG: 1 TABLET, FILM COATED ORAL at 20:40

## 2024-01-08 RX ADMIN — ASPIRIN 81 MG: 81 TABLET, COATED ORAL at 09:07

## 2024-01-08 RX ADMIN — ISOSORBIDE MONONITRATE 30 MG: 30 TABLET, EXTENDED RELEASE ORAL at 09:07

## 2024-01-08 RX ADMIN — PANTOPRAZOLE SODIUM 40 MG: 40 TABLET, DELAYED RELEASE ORAL at 06:18

## 2024-01-08 RX ADMIN — POLYETHYLENE GLYCOL 3350 17 G: 17 POWDER, FOR SOLUTION ORAL at 09:07

## 2024-01-08 RX ADMIN — METOPROLOL SUCCINATE 12.5 MG: 25 TABLET, EXTENDED RELEASE ORAL at 09:07

## 2024-01-08 RX ADMIN — TRAMADOL HYDROCHLORIDE 50 MG: 50 TABLET, COATED ORAL at 20:40

## 2024-01-08 RX ADMIN — ENOXAPARIN SODIUM 40 MG: 100 INJECTION SUBCUTANEOUS at 20:40

## 2024-01-08 RX ADMIN — RANOLAZINE 500 MG: 500 TABLET, EXTENDED RELEASE ORAL at 20:40

## 2024-01-08 RX ADMIN — TRAMADOL HYDROCHLORIDE 50 MG: 50 TABLET, COATED ORAL at 06:18

## 2024-01-08 RX ADMIN — NITROFURANTOIN MACROCRYSTALS 100 MG: 100 CAPSULE ORAL at 09:09

## 2024-01-08 RX ADMIN — PRIMIDONE 50 MG: 50 TABLET ORAL at 20:40

## 2024-01-08 RX ADMIN — ATORVASTATIN CALCIUM 10 MG: 10 TABLET, FILM COATED ORAL at 20:40

## 2024-01-08 RX ADMIN — GLIPIZIDE 7.5 MG: 5 TABLET ORAL at 06:22

## 2024-01-08 RX ADMIN — PRIMIDONE 50 MG: 50 TABLET ORAL at 09:09

## 2024-01-08 RX ADMIN — GLIPIZIDE 2.5 MG: 5 TABLET ORAL at 15:53

## 2024-01-08 RX ADMIN — NITROFURANTOIN MACROCRYSTALS 100 MG: 100 CAPSULE ORAL at 20:40

## 2024-01-08 RX ADMIN — AMLODIPINE BESYLATE 2.5 MG: 2.5 TABLET ORAL at 09:07

## 2024-01-08 RX ADMIN — RANOLAZINE 500 MG: 500 TABLET, EXTENDED RELEASE ORAL at 09:09

## 2024-01-08 ASSESSMENT — PAIN SCALES - GENERAL
PAINLEVEL_OUTOF10: 6
PAINLEVEL_OUTOF10: 8
PAINLEVEL_OUTOF10: 6

## 2024-01-08 ASSESSMENT — PAIN DESCRIPTION - LOCATION: LOCATION: HIP

## 2024-01-08 ASSESSMENT — PAIN DESCRIPTION - ORIENTATION: ORIENTATION: LEFT

## 2024-01-08 NOTE — INTERDISCIPLINARY ROUNDS
[facility] Inpatient Rehabilitation  INTERDISCIPLINARY MEETING  Date: 24  Patient Name: Komal Crawford       Room: 2633/2633-01  MRN: [id number]       : 1940  (83 y.o.)     Gender: female     Patient's care team, including nursing, speech language pathologist, occupational therapist, and/or physical therapist, met to discuss patient's care needs. Any patient concerns, change in medical status, and current transfer/mobility status were identified at this time.     Any Additional Pertinent Information:   Not Applicable    Participating Team Members:  Nurse: Gloria Shipley RN    Occupational Therapist: Maty Driver OT   Physical Therapist: Zoya Ricks PT  Speech Therapist:  N/A

## 2024-01-08 NOTE — PROGRESS NOTES
Comprehensive Nutrition Assessment    Type and Reason for Visit:  Reassess    Nutrition Recommendations/Plan:   Continue current diet.      Malnutrition Assessment:  Malnutrition Status:  No malnutrition (01/03/24 1608)    Context:  Acute Illness     Findings of the 6 clinical characteristics of malnutrition:  Energy Intake:  No significant decrease in energy intake  Weight Loss:  No significant weight loss     Body Fat Loss:  No significant body fat loss     Muscle Mass Loss:  No significant muscle mass loss    Fluid Accumulation:  No significant fluid accumulation     Strength:  Not Performed    Nutrition Assessment:    Observed lunch and dinner trays with over 75% consumed. Pt states she has been eating well. Pt states she watches her carbs at home and tries to limit sugar and avoid sweets. Follows with endocrinologist every 3 months and has Mabel to monitor blood sugars.    Nutrition Related Findings:    Trace edema RLE, LLE. PCO Glucose: 132-142. Glucotrol, Lantus, Humalog sliding scale. Other labs and meds reviewed. Hx: DM . Wound Type: None       Current Nutrition Intake & Therapies:    Average Meal Intake: %  Average Supplements Intake: None Ordered  ADULT DIET; Regular    Anthropometric Measures:  Height: 144.8 cm (4' 9\")  Ideal Body Weight (IBW): 85 lbs (39 kg)    Admission Body Weight: 72 kg (158 lb 11.7 oz)  Current Body Weight: 72 kg (158 lb 11.7 oz), 186.7 % IBW. Weight Source: Stated  Current BMI (kg/m2): 34.3        Weight Adjustment For: No Adjustment                 BMI Categories: Obese Class 1 (BMI 30.0-34.9)    Estimated Daily Nutrient Needs:  Energy Requirements Based On: Formula  Weight Used for Energy Requirements: Admission  Energy (kcal/day): 1370 kcal based on MSJ and 1.3 factor  Weight Used for Protein Requirements: Ideal  Protein (g/day): 74 gm based on 1.9 gm/kg/ideal  Method Used for Fluid Requirements: 1 ml/kcal    Nutrition Diagnosis:   Altered nutrition-related lab values

## 2024-01-08 NOTE — PROGRESS NOTES
[facility]   ACUTE REHABILITATION  LUNCH GROUP NOTE     Date: 24  Patient Name: Komal Crawford      Room: 2633/2633-01        MRN: [id number]    : 1940  (83 y.o.)  Gender: female   Referring Practitioner: Dr Johnson  Diagnosis: Debility secondary to UTI, L hip OA pain    Patient participated in the OT Program at  in the Dining Room on this date.  They were in group for 54 mins      The patient  initiated conversation. OT practitioner facilitated therapeutic conversation regarding family, reason for admission, and hobbies     They expressed  enjoyment in the setting and people.      The patient's pain was monitored as they were  able to tolerate Group activity.   They were Setup / SBA  with the activity.       Assist to open packets    ZULEIMA Sahni/AMBER

## 2024-01-08 NOTE — PROGRESS NOTES
[facility]   IN-PATIENT SERVICE   [facility]    Consultation            Date:   1/8/2024  Patient name:  Komal Crawford  Date of admission:  1/2/2024  5:24 PM  MRN:   [id number]  Account:  [id number]  YOB: 1940  PCP:    Sabiha Bedolla MD  Room:   [address] [place] [address]  Code Status:    Full Code    Chief Complaint:     Medical management  History Obtained From:     patient    History of Present Illness:     The patient is a 83 y.o.  Non- / non  female who presents with No chief complaint on file.   and she is admitted to the rehab for therapy.    Apart from weakness in the left lower extremity the patient denies any concerns.  She has known history of moderate osteoarthritis on that side, reports she was asked to get a knee replacement.  She does not want to pursue this at this time.      Past Medical History:     Past Medical History:   Diagnosis Date    Age-related cognitive decline     Ankle pain     Left ankle fracture in ortho boat.    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HCC) 1990    left with urinary incontinenc post surgical    Caffeine use     2 coffee/day, 1-2 tea per week    Cerebral artery occlusion with cerebral infarction (HCC)     Chronic back pain     Chronic ITP (idiopathic thrombocytopenia) (HCC)     CVA (cerebral infarction) 2008, 2010    balance issues, short term memory loss    Diabetic gastroparesis (HCC)     Diverticular disease 1986    GERD (gastroesophageal reflux disease)     Hiatal hernia     Hip fracture (HCC)     History of blood transfusion     History of decreased platelet count     Hyperlipemia     Hypertension     Internal hemorrhoid     Macular degeneration of left eye 1980's    S/P laser treatment    Nausea and vomiting     Neuropathy     Osteoarthritis     Ringing in ears     Self-catheterizes urinary bladder     6-7 times daily    TIA (transient ischemic attack) 2000    x1    Tubular adenoma   Fingerstick    Collection Time: 01/08/24  6:22 AM   Result Value Ref Range    POC Glucose 132 (H) 65 - 105 mg/dL   POC Glucose Fingerstick    Collection Time: 01/08/24 11:07 AM   Result Value Ref Range    POC Glucose 138 (H) 65 - 105 mg/dL   POC Glucose Fingerstick    Collection Time: 01/08/24  4:01 PM   Result Value Ref Range    POC Glucose 142 (H) 65 - 105 mg/dL       Imaging/Diagnostics:    Reviewed    Assessment :      Primary Problem  Debility    Active Hospital Problems    Diagnosis Date Noted    Debility [R53.81] 01/02/2024       Plan:     Patient status Admit as inpatient in the   rehab    UTI, completed course of ciprofloxacin  Left lower extremity pain,?  Discussed with patient it seems this is chronic she does have weakness on physical exam.  No joint swelling is noted.  Will investigate further if symptoms persistent  Neurogenic bladder, performs intermittent self catheterizations  Essential hypertension, controlled  Diabetes mellitus type 2, resume oral agents  CAD s/p stenting, denies chest pain  Thrombocytopenia, chronic  1/6   Patient complaining of severe pain in left knee, she has history of osteoarthritis affecting left hip  Will order x-ray of left knee  1/8   Xray Results Discussed with Patient   HTN- Controlled   DM- Controlled   Consultations:   IP CONSULT TO HOSPITALIST  IP CONSULT TO DIETITIAN  IP CONSULT TO SOCIAL WORK  IP CONSULT TO INTERNAL MEDICINE    Patient is admitted as inpatient status because of co-morbidities listed above, severity of signs and symptoms as outlined, requirement for current medical therapies and most importantly because of direct risk to patient if care not provided in a hospital setting.    Bala Olivares MD  1/8/2024  4:30 PM    Copy sent to Sabiha Christina MD    Please note that this chart was generated using voice recognition Dragon dictation software.  Although every effort was made to ensure the accuracy of this automated transcription, some errors in

## 2024-01-08 NOTE — PROGRESS NOTES
[facility]   IN-PATIENT SERVICE   [facility]    Consultation            Date:   1/7/2024  Patient name:  Komal Crawford  Date of admission:  1/2/2024  5:24 PM  MRN:   [id number]  Account:  [id number]  YOB: 1940  PCP:    Sabiha Bedolla MD  Room:   [address] [place] [address]  Code Status:    Full Code    Chief Complaint:     Medical management  History Obtained From:     patient    History of Present Illness:     The patient is a 83 y.o.  Non- / non  female who presents with No chief complaint on file.   and she is admitted to the rehab for therapy.    Apart from weakness in the left lower extremity the patient denies any concerns.  She has known history of moderate osteoarthritis on that side, reports she was asked to get a knee replacement.  She does not want to pursue this at this time.      Past Medical History:     Past Medical History:   Diagnosis Date    Age-related cognitive decline     Ankle pain     Left ankle fracture in ortho boat.    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HCC) 1990    left with urinary incontinenc post surgical    Caffeine use     2 coffee/day, 1-2 tea per week    Cerebral artery occlusion with cerebral infarction (HCC)     Chronic back pain     Chronic ITP (idiopathic thrombocytopenia) (HCC)     CVA (cerebral infarction) 2008, 2010    balance issues, short term memory loss    Diabetic gastroparesis (HCC)     Diverticular disease 1986    GERD (gastroesophageal reflux disease)     Hiatal hernia     Hip fracture (HCC)     History of blood transfusion     History of decreased platelet count     Hyperlipemia     Hypertension     Internal hemorrhoid     Macular degeneration of left eye 1980's    S/P laser treatment    Nausea and vomiting     Neuropathy     Osteoarthritis     Ringing in ears     Self-catheterizes urinary bladder     6-7 times daily    TIA (transient ischemic attack) 2000    x1    Tubular adenoma

## 2024-01-08 NOTE — PROGRESS NOTES
Physical Therapy  Facility/Department: [facility] ACUTE REHAB  Rehabilitation Physical Therapy     NAME: Komal Crawford  : 1940 (83 y.o.)  MRN: [id number]  CODE STATUS: Full Code    Date of Service: 24      Past Medical History:   Diagnosis Date    Age-related cognitive decline     Ankle pain     Left ankle fracture in ortho boat.    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    Caffeine use     2 coffee/day, 1-2 tea per week    Cerebral artery occlusion with cerebral infarction (HCC)     Chronic back pain     Chronic ITP (idiopathic thrombocytopenia) (HCC)     CVA (cerebral infarction) ,     balance issues, short term memory loss    Diabetic gastroparesis (HCC)     Diverticular disease     GERD (gastroesophageal reflux disease)     Hiatal hernia     Hip fracture (HCC)     History of blood transfusion     History of decreased platelet count     Hyperlipemia     Hypertension     Internal hemorrhoid     Macular degeneration of left eye     S/P laser treatment    Nausea and vomiting     Neuropathy     Osteoarthritis     Ringing in ears     Self-catheterizes urinary bladder     6-7 times daily    TIA (transient ischemic attack) 2000    x1    Tubular adenoma     colon polyps    Type II or unspecified type diabetes mellitus without mention of complication, not stated as uncontrolled     Urinary incontinence     frequent UTI s/p infection, surgical dilatation lead to incontinence    Wears dentures     full on top, partial on bottom    Wears glasses      Past Surgical History:   Procedure Laterality Date    APPENDECTOMY      BLADDER SURGERY      bladder stimulator    BLADDER SUSPENSION      multiple    CARDIAC CATHETERIZATION      no stents    CARDIOVASCULAR STRESS TEST  2014    CATARACT REMOVAL WITH IMPLANT Left 2017    Tyree/Sulaiman    CATARACT REMOVAL WITH IMPLANT Right 2017

## 2024-01-08 NOTE — PROGRESS NOTES
Physical Medicine & Rehabilitation  Progress Note      Subjective:      83 year-old female with debility secondary to UTI and L hip OA. Patient is observed in therapy today and doing well. Her pain is improving. She does experience periodic giveway weakness of the L knee. No new issues with sleep or appetite. Intermittent straight catheterization per home routine is going well. Bowels are controlled with her home routine of dulcolax PO.     ROS:  Denies fevers, chills, sweats.  No chest pain, palpitations, lightheadedness.  Denies coughing, wheezing or shortness of breath.  Denies abdominal pain, nausea, diarrhea or constipation.  No new areas of joint pain.Worsened L knee pain.   Denies new areas of numbness or weakness.  Denies new anxiety or depression issues.  No new skin problems.    Rehabilitation:       PT:    Bed mobility  Rolling to Left: Minimal assistance (Pt in extreme pain when rolled onto left side. pt prefers to long sit in bed and than bring her legs to EOB.)  Rolling to Right: Stand by assistance  Supine to Sit: Minimal assistance (If log rolling technique use. Long sitting and than scooitn EOB is SBA.)  Sit to Supine: Stand by assistance  Scooting: Contact guard assistance  Bed Mobility Comments: Increased time needed due to left hip/thigh pain.         Transfers  Sit to Stand: Stand by assistance  Stand to Sit: Stand by assistance  Bed to Chair: Stand by assistance  Stand Pivot Transfers: Stand by assistance  Comment: transfers completed with RW  Transfers  Surface: Wheelchair, To chair with arms  Additional Factors: With handrails, Hand placement cues  Device: Walker  Sit to Stand  Assistance Level: Contact guard assist  Skilled Clinical Factors: VC's provided for handplacement  Stand to Sit  Assistance Level: Contact guard assist  Skilled Clinical Factors: VC's for controlled decent.  Bed To/From Chair  Technique: Stand step  Assistance Level: Contact guard assist  Skilled Clinical Factors: VC  for pivoting on non-surgical side with correction in direction upon attempt.  Stand Pivot  Assistance Level: Contact guard assist      Ambulation  WB Status: No WB restricitions noted in the chart at this time.  Ambulation  Surface: Level tile  Device: Rolling Walker  Other Apparatus: Wheelchair follow (pulled by writer)  Assistance: Stand by assistance  Quality of Gait: Decreased tolerance to WB left LE due to pain. decreased step height left LE, at times shuffling noted left LE due to pain.  Gait Deviations: Decreased step length, Decreased step height  Distance: 140' and short distances in gym (AM&PM)  Comments: Per patient distance and quality of gait limited by pain.  More Ambulation?: No (deferred PM gait due to increased left knee pain and edema)  Ambulation  Surface: Level surface  Device: Rolling walker  Distance: 65ft  Activity: Within Unit  Additional Factors: Increased time to complete, Hand placement cues, Verbal cues  Assistance Level: Contact guard assist  Gait Deviations: Unsteady gait, Narrow base of support, Path deviations  Skilled Clinical Factors: Pt with improved gait with VC to decrease step length. Small , slow steps, deviated path.    OT:  Grooming/Oral Hygiene  Assistance Level: Supervision  Skilled Clinical Factors: Standing at sink for oral/denture and hair care.  Upper Extremity Bathing  Assistance Level: Modified independent  Skilled Clinical Factors: Completes seated on TTB.  Lower Extremity Bathing  Assistance Level: Modified independent  Skilled Clinical Factors: Good safety standing at GB.  Upper Extremity Dressing  Assistance Level: Modified independent  Skilled Clinical Factors: pt able to obtain items from closet, seated to don/doff shirt and bra  Lower Extremity Dressing  Equipment Provided: Reachers  Assistance Level: Modified independent  Skilled Clinical Factors: Increased time and use of reacher. No LOB noted.  Putting On/Taking Off Footwear  Equipment Provided: Reachers,

## 2024-01-08 NOTE — PATIENT CARE CONFERENCE
[facility] Acute Inpatient Rehabilitation  TEAM CONFERENCE NOTE  Date: 24  Patient Name: Komal Crawford       Room: 2633/2633-01  MRN: [id number]       : 1940  (83 y.o.)     Gender: female     Referring Practitioner: Dr Johnson     PRINCIPAL DIAGNOSIS: Debility    NURSING    Bladder Continence   Patient self cath prn   Bowel Continence Occasionally Incontinent    Date of Last BM: 2024    Bladder/Bowel Program Interventions: Bowel Program In Place    Wounds/Incisions/Ulcers:  Buttocks red    Pain Control: Patient's pain currently controlled and pain regimen effective as ordered    Pain Medication Regimen Usage Pattern: MAR reviewed and pain medications are being used at the following frequency (Specify Medication, # Doses Administered on average per day, identified patterns of use - for example: time of day, prior to activity/therapy)  Tylenol 650mg Q6 prn 1-2 prn, Tramadol 50mg Q6 prn 1-2 times a day.    Fall Risk:  Falling star program initiated    Medication Education Program: Patient able to manage medications and being educated by nursing    Discharge Preparation Patient/Responsible Party Education In Progress:   Urinary Catheter Management    Nursing specific communication for TEAM: No additional information identified requiring communication at this time    PHYSICAL THERAPY  Bed mobility  Bridging: Modified independent   Rolling to Left: Modified independent (Pt in extreme pain when rolled onto left side. pt prefers to long sit in bed and than bring her legs to EOB.)  Rolling to Right: Modified independent  Supine to Sit: Modified independent  Sit to Supine: Modified independent  Scooting: Modified independent  Bed Mobility Comments: patient self assisted LLE    Transfers:  Sit to Stand: Stand by assistance  Stand to Sit: Stand by assistance  Bed to Chair: Stand by assistance    WB Status: No WB restricitions noted in the chart at this time.  Ambulation  Surface: Level tile  Device:

## 2024-01-08 NOTE — PROGRESS NOTES
stated as uncontrolled, Urinary incontinence, Wears dentures, and Wears glasses.    Past Surgical History:   has a past surgical history that includes bladder suspension (2002); Hysterectomy (1969); Tonsillectomy (1978); Appendectomy (1962); Spine surgery (2010); colostomy (1986); Revision Colostomy (1986); Spine surgery (1990); Upper gastrointestinal endoscopy; Bladder surgery; Upper gastrointestinal endoscopy (05/05/2014); cardiovascular stress test (12/2014); Colon surgery; Total abdominal hysterectomy w/ bilateral salpingoophorectomy; fracture surgery (Right, 2011); fracture surgery (Left, 2001); fracture surgery (Left, 2013); Cardiac catheterization (2008); Revision total knee arthroplasty (Right, 10/27/2015); Colonoscopy (04/07/2016); Upper gastrointestinal endoscopy (04/07/2016); lumbar fusion (2017); Cystocopy (09/08/2017); Cataract removal with implant (Left, 11/07/2017); pr xcapsl ctrc rmvl insj io lens prosth w/o ecp (Left, 11/07/2017); Cataract removal with implant (Right, 11/28/2017); pr xcapsl ctrc rmvl insj io lens prosth w/o ecp (Right, 11/28/2017); Upper gastrointestinal endoscopy (N/A, 07/17/2018); joint replacement (Bilateral, 2000); hernia repair; Colonoscopy (N/A, 11/06/2019); Revision total knee arthroplasty (Left, 07/14/2020); Colonoscopy (11/06/2019); Coronary angioplasty with stent (08/04/2022); Total hip arthroplasty (Right, 11/6/2023); and hip surgery (Right, 11/6/2023).    Restrictions  Restrictions/Precautions  Restrictions/Precautions: Fall Risk, Weight Bearing  Required Braces or Orthoses?: No  Implants present? : Metal implants (B TKA; R ISHAN)     Position Activity Restriction  Hip Precautions: Posterior hip precautions (ISHAN Nov 6th, 2023 by Dr. Bonner)      Subjective  Subjective  Subjective: \"I'm ready to take a shower\" Pt is pleasant and motivated for therapy.  Pain Assessment  Pain Assessment: 0-10  Pain Level: 6  Pain Location: Hip  Pain Orientation:  Left    Objective  Cognition  Overall Orientation Status: Within Functional Limits       Cognition  Overall Cognitive Status: WFL    Activities of Daily Living  Feeding  Assistance Level: Modified independent  Skilled Clinical Factors: per pt report    Upper Extremity Bathing  Assistance Level: Modified independent  Skilled Clinical Factors: Completes seated on TTB.    Lower Extremity Bathing  Assistance Level: Modified independent  Skilled Clinical Factors: Good safety standing at GB.    Upper Extremity Dressing  Assistance Level: Modified independent  Skilled Clinical Factors: pt able to obtain items from closet, seated to don/doff shirt and bra    Lower Extremity Dressing  Assistance Level: Modified independent  Skilled Clinical Factors: Increased time and use of reacher. No LOB noted.    Putting On/Taking Off Footwear  Equipment Provided: Reachers;Sock aid;Foot funnel  Assistance Level: Modified independent  Skilled Clinical Factors: A for TEDs. Pt reports her  assists with these at home. Pt walking community distances. Pt able to doff/anila B footies and tennis shoes with incresed time and use of AE.    Toileting  Assistance Level: Modified independent  Skilled Clinical Factors: Per pt report    Toilet Transfers  Technique:  (amb with R/W)  Equipment: Raised toilet seat with arms;Grab bars  Additional Factors: Increased time to complete;Verbal cues  Assistance Level: Modified independent  Skilled Clinical Factors: good safety with RW, no LOB noted.    Tub/Shower Transfers  Type: Shower  Transfer From: Rolling walker  Transfer To: Tub transfer bench  Additional Factors: Verbal cues;Increased time to complete  Assistance Level: Supervision  Skilled Clinical Factors: SUP over threshold with VC for sequencing with RW.    Mobility  Sit to Stand  Assistance Level: Modified independent  Skilled Clinical Factors: Good safety and hand placement noted.  Stand to Sit  Assistance Level: Modified independent  Skilled

## 2024-01-08 NOTE — PLAN OF CARE
Problem: Discharge Planning  Goal: Discharge to home or other facility with appropriate resources  Outcome: Progressing     Problem: Safety - Adult  Goal: Free from fall injury  Outcome: Progressing     Problem: Pain  Goal: Verbalizes/displays adequate comfort level or baseline comfort level  Outcome: Progressing     Problem: Chronic Conditions and Co-morbidities  Goal: Patient's chronic conditions and co-morbidity symptoms are monitored and maintained or improved  Outcome: Progressing     Problem: Nutrition Deficit:  Goal: Optimize nutritional status  Outcome: Progressing     Problem: Skin/Tissue Integrity  Goal: Absence of new skin breakdown  Description: 1.  Monitor for areas of redness and/or skin breakdown  2.  Assess vascular access sites hourly  3.  Every 4-6 hours minimum:  Change oxygen saturation probe site  4.  Every 4-6 hours:  If on nasal continuous positive airway pressure, respiratory therapy assess nares and determine need for appliance change or resting period.  Outcome: Progressing

## 2024-01-09 VITALS
DIASTOLIC BLOOD PRESSURE: 68 MMHG | HEART RATE: 72 BPM | HEIGHT: 57 IN | OXYGEN SATURATION: 100 % | BODY MASS INDEX: 34.24 KG/M2 | TEMPERATURE: 98.1 F | WEIGHT: 158.73 LBS | RESPIRATION RATE: 12 BRPM | SYSTOLIC BLOOD PRESSURE: 133 MMHG

## 2024-01-09 LAB
GLUCOSE BLD-MCNC: 133 MG/DL (ref 65–105)
GLUCOSE BLD-MCNC: 147 MG/DL (ref 65–105)

## 2024-01-09 PROCEDURE — 97110 THERAPEUTIC EXERCISES: CPT

## 2024-01-09 PROCEDURE — 97116 GAIT TRAINING THERAPY: CPT

## 2024-01-09 PROCEDURE — 97530 THERAPEUTIC ACTIVITIES: CPT

## 2024-01-09 PROCEDURE — 82947 ASSAY GLUCOSE BLOOD QUANT: CPT

## 2024-01-09 PROCEDURE — 6370000000 HC RX 637 (ALT 250 FOR IP): Performed by: INTERNAL MEDICINE

## 2024-01-09 PROCEDURE — 97535 SELF CARE MNGMENT TRAINING: CPT

## 2024-01-09 PROCEDURE — 6370000000 HC RX 637 (ALT 250 FOR IP): Performed by: PHYSICAL MEDICINE & REHABILITATION

## 2024-01-09 PROCEDURE — 99238 HOSP IP/OBS DSCHRG MGMT 30/<: CPT | Performed by: PHYSICAL MEDICINE & REHABILITATION

## 2024-01-09 PROCEDURE — 51701 INSERT BLADDER CATHETER: CPT

## 2024-01-09 RX ORDER — METOPROLOL SUCCINATE 25 MG/1
12.5 TABLET, EXTENDED RELEASE ORAL DAILY
Qty: 30 TABLET | Refills: 3 | Status: ON HOLD | OUTPATIENT
Start: 2024-01-10

## 2024-01-09 RX ORDER — NITROFURANTOIN MACROCRYSTALS 100 MG/1
100 CAPSULE ORAL EVERY 12 HOURS SCHEDULED
Qty: 20 CAPSULE | Refills: 0 | Status: ON HOLD | OUTPATIENT
Start: 2024-01-09 | End: 2024-01-25 | Stop reason: HOSPADM

## 2024-01-09 RX ORDER — INSULIN GLARGINE 100 [IU]/ML
10 INJECTION, SOLUTION SUBCUTANEOUS NIGHTLY
Qty: 5 ADJUSTABLE DOSE PRE-FILLED PEN SYRINGE | Refills: 1 | Status: ON HOLD | OUTPATIENT
Start: 2024-01-09

## 2024-01-09 RX ORDER — PEN NEEDLE, DIABETIC 29 GAUGE
1 NEEDLE, DISPOSABLE MISCELLANEOUS DAILY
Qty: 100 EACH | Refills: 3 | Status: ON HOLD | OUTPATIENT
Start: 2024-01-09

## 2024-01-09 RX ORDER — RANOLAZINE 500 MG/1
500 TABLET, EXTENDED RELEASE ORAL 2 TIMES DAILY
Qty: 60 TABLET | Refills: 1 | Status: ON HOLD | OUTPATIENT
Start: 2024-01-09

## 2024-01-09 RX ORDER — TRAMADOL HYDROCHLORIDE 50 MG/1
50 TABLET ORAL EVERY 6 HOURS PRN
Qty: 28 TABLET | Refills: 0 | Status: SHIPPED | OUTPATIENT
Start: 2024-01-09 | End: 2024-01-16

## 2024-01-09 RX ORDER — POLYETHYLENE GLYCOL 3350 17 G/17G
17 POWDER, FOR SOLUTION ORAL DAILY PRN
Qty: 527 G | Refills: 1 | Status: ON HOLD | COMMUNITY
Start: 2024-01-09

## 2024-01-09 RX ORDER — BISACODYL 5 MG/1
5 TABLET, DELAYED RELEASE ORAL 2 TIMES DAILY PRN
Status: ON HOLD | COMMUNITY
Start: 2024-01-09

## 2024-01-09 RX ADMIN — PANTOPRAZOLE SODIUM 40 MG: 40 TABLET, DELAYED RELEASE ORAL at 05:59

## 2024-01-09 RX ADMIN — GLIPIZIDE 7.5 MG: 5 TABLET ORAL at 05:59

## 2024-01-09 RX ADMIN — ISOSORBIDE MONONITRATE 30 MG: 30 TABLET, EXTENDED RELEASE ORAL at 09:06

## 2024-01-09 RX ADMIN — TRAMADOL HYDROCHLORIDE 50 MG: 50 TABLET, COATED ORAL at 06:03

## 2024-01-09 RX ADMIN — NITROFURANTOIN MACROCRYSTALS 100 MG: 100 CAPSULE ORAL at 09:07

## 2024-01-09 RX ADMIN — PRIMIDONE 50 MG: 50 TABLET ORAL at 09:08

## 2024-01-09 RX ADMIN — METOPROLOL SUCCINATE 12.5 MG: 25 TABLET, EXTENDED RELEASE ORAL at 09:06

## 2024-01-09 RX ADMIN — CLOPIDOGREL BISULFATE 75 MG: 75 TABLET ORAL at 09:06

## 2024-01-09 RX ADMIN — POLYETHYLENE GLYCOL 3350 17 G: 17 POWDER, FOR SOLUTION ORAL at 09:06

## 2024-01-09 RX ADMIN — ASPIRIN 81 MG: 81 TABLET, COATED ORAL at 09:06

## 2024-01-09 RX ADMIN — RANOLAZINE 500 MG: 500 TABLET, EXTENDED RELEASE ORAL at 09:07

## 2024-01-09 RX ADMIN — AMLODIPINE BESYLATE 2.5 MG: 2.5 TABLET ORAL at 09:06

## 2024-01-09 RX ADMIN — ACETAMINOPHEN 650 MG: 325 TABLET ORAL at 10:20

## 2024-01-09 ASSESSMENT — PAIN DESCRIPTION - LOCATION: LOCATION: LEG

## 2024-01-09 ASSESSMENT — PAIN DESCRIPTION - ORIENTATION: ORIENTATION: RIGHT

## 2024-01-09 ASSESSMENT — PAIN SCALES - GENERAL
PAINLEVEL_OUTOF10: 4
PAINLEVEL_OUTOF10: 4

## 2024-01-09 NOTE — PLAN OF CARE
Problem: Discharge Planning  Goal: Discharge to home or other facility with appropriate resources  1/9/2024 0005 by Crystal Jameson RN  Outcome: Progressing  1/8/2024 1548 by Gloria Shipley RN  Outcome: Progressing     Problem: Safety - Adult  Goal: Free from fall injury  1/9/2024 0005 by Crystal Jameson RN  Outcome: Progressing  1/8/2024 1548 by Gloria Shipley RN  Outcome: Progressing     Problem: Pain  Goal: Verbalizes/displays adequate comfort level or baseline comfort level  1/9/2024 0005 by Crystal Jameson RN  Outcome: Progressing  1/8/2024 1548 by Gloria Shipley RN  Outcome: Progressing     Problem: Chronic Conditions and Co-morbidities  Goal: Patient's chronic conditions and co-morbidity symptoms are monitored and maintained or improved  1/9/2024 0005 by Crystal Jameson RN  Outcome: Progressing  1/8/2024 1548 by Gloria Shipley RN  Outcome: Progressing     Problem: Nutrition Deficit:  Goal: Optimize nutritional status  1/9/2024 0005 by Crystal Jameson RN  Outcome: Progressing  Flowsheets (Taken 1/8/2024 1644 by Rasheeda Bloom, RD, LD)  Nutrient intake appropriate for improving, restoring, or maintaining nutritional needs: Monitor oral intake, labs, and treatment plans  1/8/2024 1548 by Gloria Shipley RN  Outcome: Progressing     Problem: Skin/Tissue Integrity  Goal: Absence of new skin breakdown  Description: 1.  Monitor for areas of redness and/or skin breakdown  2.  Assess vascular access sites hourly  3.  Every 4-6 hours minimum:  Change oxygen saturation probe site  4.  Every 4-6 hours:  If on nasal continuous positive airway pressure, respiratory therapy assess nares and determine need for appliance change or resting period.  1/9/2024 0005 by Crystal Jameson RN  Outcome: Progressing  1/8/2024 1548 by Gloria Shipley RN  Outcome: Progressing

## 2024-01-09 NOTE — DISCHARGE INSTR - COC
Continuity of Care Form    Patient Name: Komal Crawford   :  1940  MRN:  [id number]    Admit date:  2024  Discharge date:  ***    Code Status Order: Full Code   Advance Directives:     Admitting Physician:  Sakshi Johnson MD  PCP: Sabiha Bedolla MD    Discharging Nurse: ***  Discharging Hospital Unit/Room#: 2633/2633-01  Discharging Unit Phone Number: ***    Emergency Contact:   Extended Emergency Contact Information  Primary Emergency Contact: Ritchie Crawford  Address: [address] [place] [address]            [place] [address] [facility]  Home Phone: [phone]  Work Phone: [phone]  Mobile Phone: [phone]  Relation: Spouse  Secondary Emergency Contact: An Ventura  Home Phone: [phone]  Mobile Phone: [phone]  Relation: Other    Past Surgical History:  Past Surgical History:   Procedure Laterality Date    APPENDECTOMY      BLADDER SURGERY      bladder stimulator    BLADDER SUSPENSION      multiple    CARDIAC CATHETERIZATION      no stents    CARDIOVASCULAR STRESS TEST  2014    CATARACT REMOVAL WITH IMPLANT Left 2017    [facility]    CATARACT REMOVAL WITH IMPLANT Right 2017    [facility]    COLON SURGERY      colostomy reversal    COLONOSCOPY  2016    tubular adenoma x3; internal hemorrhoids    COLONOSCOPY N/A 2019    COLONOSCOPY DIAGNOSTIC performed by Eli Marinelli MD at [email or web address] ENDO    COLONOSCOPY  2019    COLOSTOMY  1986    bowel obstruction/ rupture from diverticular disease, reversal 3 mos later    CORONARY ANGIOPLASTY WITH STENT PLACEMENT  2022    CYSTOSCOPY  2017    W/ 200IU Botox     FRACTURE SURGERY Right     hip    FRACTURE SURGERY Left     WRIST    FRACTURE SURGERY Left     ankle    HERNIA REPAIR      HIP SURGERY Right 2023    HIP HARDWARE REMOVAL - WITH DEEP HARDWARE REMOVAL 7.3 NABILA SCREW   X3 performed by Jon Bonner MD at [email or web address] OR    HYSTERECTOMY (CERVIX STATUS  ***    Physician Certification: I certify the above information and transfer of Komal Crawford  is necessary for the continuing treatment of the diagnosis listed and that she requires {Admit to Appropriate Level of Care:40693} for {GREATER/LESS:474883394} 30 days.     Update Admission H&P: {CHP DME Changes in HandP:548767149}    PHYSICIAN SIGNATURE:  {Esignature:[id number]}

## 2024-01-09 NOTE — CARE COORDINATION
[facility]  Acute Inpatient Rehabilitation Unit    Date: 1/8/2024  Patient’s Name: Komal Crawford  MRN: [id number]      Order received for the following item(s): Rolling walker. Order will be sent to [facility] once face-to-face is complete.       Kailey Joshi OTR/L    
  [facility]  Acute Inpatient Rehabilitation Unit    Date: 1/9/2024  Patient’s Name: Komal Crawford  MRN: [id number]    UPDATE: F2F complete, order faxed to [facility] 1/9/2024.    Order received for the following item(s): Rolling walker. Order will be sent to [facility] once face-to-face is complete.      Kailey Joshi OTR/L    
  [facility] Acute Inpatient Rehabilitation  Case Management Discharge Note    Discharge Disposition: Home with spouse    Discharge Transportation Method: car ,  Time: afternoon    IMM Letter Status: Patient/Responsible Party agreeable with discharge: Second Signature Obtained, Patient/Responsible Party offered four hours to make informed decision regarding appeal process - Completed Letter Placed in Patient Chart    Home Healthcare Services: Not Applicable    Outpatient Therapy Services:  [facility]    DME:  n/a    Current reconciled medication list sent to subsequent provider via: Paper Based (e.g. fax, copies, printouts)      Patient Health Questionnaire-9 (PHQ-2 to 9)   Over the last 2 weeks, how often have you been bothered by any of the following problems?    1. Little Interest or pleasure in doing things?   Never or 1 Day - Score 0    2. Feeling down, depressed or hopeless?   Never or 1 Day - Score 0    3. Trouble falling or staying asleep, or sleeping too much?   Never or 1 Day - Score 0    4. Feeling tired or having little energy?   Never or 1 Day - Score 0    5. Poor appetite or overeating?   Never or 1 Day - Score 0    6. Feeling bad about yourself-or that you are a failure or have let yourself or your family down?   Never or 1 Day - Score 0    7. Trouble concentrating on things, such as reading the newspaper or watching television?    Never or 1 Day - Score 0    8. Moving or speaking so slowly that other people could have noticed? Or the opposite-being so fidgety or restless that you have been moving around a lot more than usual?  Never or 1 Day - Score 0    9. Thoughts that you would be better off dead or of hurting yourself in some way?   Never or 1 Day - Score 0    Total Score: 0  If score is above 15, Notify PM&R Physician    If you checked off any problems, how difficult have these problems made it for you to do your work, take care of things at home, or get along with other 
ARU CASE MANAGEMENT NOTE:    Patient is alert and oriented x4.    Spoke with patient regarding discharge plan: Return home with spouse and would like to have OP therapy at [facility].     Outside appointments while in ARU: None    Will continue to follow for additional discharge needs.      Electronically signed by Herb Monroe RN on 1/8/2024 at 3:34 PM   
ARU CASE MANAGEMENT NOTE:    Patient is alert and oriented x4.    Spoke with patient regarding discharge plan: Return home with spouse and would like to have OP therapy at [facility].    Outside appointments while in ARU: None    Will continue to follow for additional discharge needs.      Electronically signed by Herb Monroe RN on 1/5/2024 at 10:24 AM   
chosen: Yes- Outpatient- [facility]    Does patient go to outpatient dialysis: No  If yes, location and chair time: N/A    Would you like Case Management to discuss the discharge plan with any other family members/significant others, and if so, who? Friend An  Plans to Return to Present Housing: Yes  Potential Assistance needed at discharge: Outpatient PT/OT  Patient expects to discharge to: Trailer/mobile home  Plan for transportation at discharge:  Friend    ADLS  Prior functional level:    Current functional level:      PT AM-PAC:   /24  OT AM-PAC:   /24    Financial  Payor: MEDICARE / Plan: MEDICARE PART A AND B / Product Type: *No Product type* /     IMM Letter Status: First Signature Obtained    What Pharmacy do you use:    [facility] - [place], [place] - [address] - P [phone] - F [phone]  [address]  [facility] [address]  Phone: [phone] Fax: [phone]    Meds-to-Beds request: No  Does insurance require precert for SNF: No  Potential assistance Purchasing Medications: No      Notes:  Factors facilitating achievement of predicted outcomes: Family support, Friend support, Motivated, Cooperative, and Pleasant  Barriers to discharge: Pain, Limited safety awareness, Decreased endurance, Lower extremity weakness, and Impaired vision    The Plan for Transition of Care is related to the following treatment goals of Debility [R53.81]    IF APPLICABLE: The Patient and/or patient representative Komal and her family were provided with a choice of provider and agrees with the discharge plan. Freedom of choice list with basic dialogue that supports the patient's individualized plan of care/goals and shares the quality data associated with the providers was provided to:     Patient Representative Name:     The Patient and/or Patient Representative Agree with the Discharge Plan?      Additional Case Management Notes: Dispo: Home with spouse./ Home DME: Rollator, Cane, Walker, Tub transfer

## 2024-01-09 NOTE — PLAN OF CARE
Problem: Discharge Planning  Goal: Discharge to home or other facility with appropriate resources  Outcome: Adequate for Discharge  Flowsheets (Taken 1/9/2024 0900)  Discharge to home or other facility with appropriate resources:   Identify barriers to discharge with patient and caregiver   Arrange for needed discharge resources and transportation as appropriate   Identify discharge learning needs (meds, wound care, etc)   Refer to discharge planning if patient needs post-hospital services based on physician order or complex needs related to functional status, cognitive ability or social support system     Problem: Safety - Adult  Goal: Free from fall injury  Outcome: Adequate for Discharge     Problem: Pain  Goal: Verbalizes/displays adequate comfort level or baseline comfort level  Outcome: Adequate for Discharge     Problem: Chronic Conditions and Co-morbidities  Goal: Patient's chronic conditions and co-morbidity symptoms are monitored and maintained or improved  Outcome: Adequate for Discharge  Flowsheets (Taken 1/9/2024 0900)  Care Plan - Patient's Chronic Conditions and Co-Morbidity Symptoms are Monitored and Maintained or Improved:   Monitor and assess patient's chronic conditions and comorbid symptoms for stability, deterioration, or improvement   Collaborate with multidisciplinary team to address chronic and comorbid conditions and prevent exacerbation or deterioration   Update acute care plan with appropriate goals if chronic or comorbid symptoms are exacerbated and prevent overall improvement and discharge     Problem: Nutrition Deficit:  Goal: Optimize nutritional status  Outcome: Adequate for Discharge     Problem: Skin/Tissue Integrity  Goal: Absence of new skin breakdown  Description: 1.  Monitor for areas of redness and/or skin breakdown  2.  Assess vascular access sites hourly  3.  Every 4-6 hours minimum:  Change oxygen saturation probe site  4.  Every 4-6 hours:  If on nasal continuous positive  airway pressure, respiratory therapy assess nares and determine need for appliance change or resting period.  Outcome: Adequate for Discharge

## 2024-01-09 NOTE — DISCHARGE SUMMARY
Physical Medicine & Rehabilitation  Discharge Summary     Patient Identification:  Komal Crawford  : 1940  Admit date: 2024  Discharge date: 2024   Attending provider: Betty Dubose MD      Discharging provider: BETTY DUBOSE MD    Primary care provider: Sabiha Bedolla MD     Discharge Diagnoses:   Debility secondary to UTI  Moderate OA L hip  L knee MCL sprain  Neurogenic bladder  HTN  DM II  CAD  Chronic idiopathic thrombocytopenia  RLS  Tremor    Discharge Functional Status:    Physical Therapy:  Bed Mobility:   Bed mobility  Bridging: Modified independent   Rolling to Left: Modified independent (Pt in extreme pain when rolled onto left side. pt prefers to long sit in bed and than bring her legs to EOB.)  Rolling to Right: Modified independent  Supine to Sit: Modified independent  Sit to Supine: Modified independent  Scooting: Modified independent  Bed Mobility Comments: patient self assisted LLE         Transfers:   Transfers  Sit to Stand: Stand by assistance  Stand to Sit: Stand by assistance  Bed to Chair: Stand by assistance  Stand Pivot Transfers: Stand by assistance  Car Transfer: Contact guard assistance (step up on step to enter car, patient knee buckled with forward step up. Repeated x 4 with step up backward no buckle occured. patient reported continue in that fashion.)  Comment: transfers completed with RW  Transfers  Surface: Wheelchair, To chair with arms  Additional Factors: With handrails, Hand placement cues  Device: Walker  Sit to Stand  Assistance Level: Contact guard assist  Skilled Clinical Factors: VC's provided for handplacement  Stand to Sit  Assistance Level: Contact guard assist  Skilled Clinical Factors: VC's for controlled decent.  Bed To/From Chair  Technique: Stand step  Assistance Level: Contact guard assist  Skilled Clinical Factors: VC for pivoting on non-surgical side with correction in direction upon attempt.  Stand Pivot  Assistance Level: Contact guard  Jonathan. Pt reports her  assists with these at home. Pt walking community distances. Pt able to doff/anila B footies and tennis shoes with incresed time and use of AE.  Toileting  Assistance Level: Modified independent  Skilled Clinical Factors: Pt able to complete all aspects with good safety noted.   Toilet Transfers  Technique:  (ambulating with RW)  Equipment: Raised toilet seat with arms, Grab bars  Additional Factors: Increased time to complete, Verbal cues  Assistance Level: Modified independent  Skilled Clinical Factors: good safety with RW, no LOB noted.      Speech Therapy:         Inpatient Rehabilitation Course:   Komal Crawford is a 83 y.o. female admitted to inpatient rehabilitation on 1/2/2024 for rehab for Debility secondary to UTI .      INITIAL HPI:  Komal Crawford  is a 83 y.o. right-handed female admitted to the [place] [facility] unit on 1/2/2024.  She was originally admitted to [facility] on 12/28/2023.       Patient admitted with worsening hip and leg pain with impaired ambulation. CT scan showed moderate osteoarthritis with no acute changes. U/A was positive - patient has known history of straight cathing for neurogenic bladder and is on chronic macrobid. Internal medicine started her on ciprofloxacin empirically for possible UTI.     Patient evaluated today:  GEN: Well developed, well nourished, no acute distress.  HEENT: NCAT, PERRL, EOMI, mucous membranes pink and moist.  RESP: Lungs clear to auscultation. No rales or rhonchi. Respirations WNL and unlabored.  CV: bradycardic rate regular rhythm. No murmurs, rubs, or gallops.  ABD: soft, non-distended, non-tender. BS+ and equal.  NEURO: A&O x 3. Sensation intact to light touch.   MSK: Functional ROM. Muscle tone and bulk are normal bilaterally. Strength 4+/5 key muscles BUEs and RLE. 4/5 key muscles L hip flexion, 4+/5 L knee extension. L medial joint line tenderness and pain with valgus stress testing.   EXT: No calf tenderness

## 2024-01-09 NOTE — PROGRESS NOTES
needed    Assessment  Assessment  Activity Tolerance: Patient limited by pain;Patient tolerated treatment well  Discharge Recommendations: Home with assist PRN;Continue to assess pending progress    Patient Education  Education  Education Given To: Patient  Education Provided: Role of Therapy;Plan of Care;Home Exercise Program;Safety;ADL Function;Energy Conservation;Visual Perceptual Function;DME/Home Modifications  Education Provided Comments: BUE HEP with appropriate modifications  Education Method: Demonstration;Verbal;Printed Information/Hand-outs  Barriers to Learning: None  Education Outcome: Verbalized understanding;Demonstrated understanding;Continued education needed    OT Equipment Recommendations  Equipment Needed: Yes  Mobility Devices: ADL Assistive Devices  ADL Assistive Devices:  (foor funnel)  Other: pt reports having shower seat, GB in shower, rail near toilet, rollator and R/W, cane, reacher and LHSH    Safety Devices  Safety Devices in place: Yes  Type of devices: Left in chair;Call light within reach       Goals  Patient Goals   Patient goals : \"Get back to doing what I normally do everyday.\"  Short Term Goals  Time Frame for Short Term Goals: By 4-5 days  Short Term Goal 1: Pt will complete lower body dressing/bathing with SBA and Good safety with use of AE as needed  Short Term Goal 2: Pt will complete functional transfers/mobility during self care tasks with Supervision and Good safety with use of least restrictive device  Short Term Goal 3: Pt will tolerate standing 5+ minutes during functional activity of choice with Good safety  Short Term Goal 4: Pt will verbalize/demonstrate Good understanding of AE/adaptive strategies/DME to increase safety and independence with self care and mobility  Short Term Goal 5: Pt will verbalize/demonstrate 3 non-pharmaceutical pain management strategies to increase participation in daily activities and improve overall quality of life  Short Term Goal 6: Pt  will participate in 30+ minutes of therapeutic exercises/functional activities/social participation to increase safety and independence with daily activities and to improve overall quality of life    Long Term Goals  Time Frame for Long Term Goals : By discharge  Long Term Goal 1: Pt will complete BADLs with Mod I and Good safety with use of AE as needed  Long Term Goal 2: Pt will complete functional transfers/mobility during self care tasks with Mod I and Good safety with use of least restrictive device  Long Term Goal 3: Pt will tolerate standing 10+ minutes during functional activity of choice with Good safety  Long Term Goal 4: Pt will complete simple meal prep/light house keeping task with Supervision and Good safety  Long Term Goal 5: Pt will demonstrated increased FMC/ strength with L UE during self care tasks as evident by 5# improvement in  strength and 5 second improvement on 9 hole peg test  Long Term Goal 6: Pt will verbalize/demonstrate Good understanding of home safety/fall prevention strategies to increase safety and independence with self care and mobility    Plan  Occupational Therapy Plan  Times Per Week: 5-7  Times Per Day: Twice a day  Current Treatment Recommendations: Self-Care / ADL, Strengthening, Balance training, Functional mobility training, Endurance training, Pain management, Safety education & training, Patient/Caregiver education & training, Equipment evaluation, education, & procurement, Home management training         01/09/24 0802   OT Individual Minutes   Time In 0802   Time Out 0857   Minutes 55         Electronically signed by BRIANNA Gould on 1/9/24 at 9:30 AM EST

## 2024-01-09 NOTE — PROGRESS NOTES
Physical Therapy  Facility/Department: [facility] ACUTE REHAB  Rehabilitation Physical Therapy     NAME: Komal Crawford  : 1940 (83 y.o.)  MRN: [id number]  CODE STATUS: Full Code    Date of Service: 24      Past Medical History:   Diagnosis Date    Age-related cognitive decline     Ankle pain     Left ankle fracture in ortho boat.    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    Caffeine use     2 coffee/day, 1-2 tea per week    Cerebral artery occlusion with cerebral infarction (HCC)     Chronic back pain     Chronic ITP (idiopathic thrombocytopenia) (HCC)     CVA (cerebral infarction) ,     balance issues, short term memory loss    Diabetic gastroparesis (HCC)     Diverticular disease     GERD (gastroesophageal reflux disease)     Hiatal hernia     Hip fracture (HCC)     History of blood transfusion     History of decreased platelet count     Hyperlipemia     Hypertension     Internal hemorrhoid     Macular degeneration of left eye     S/P laser treatment    Nausea and vomiting     Neuropathy     Osteoarthritis     Ringing in ears     Self-catheterizes urinary bladder     6-7 times daily    TIA (transient ischemic attack) 2000    x1    Tubular adenoma     colon polyps    Type II or unspecified type diabetes mellitus without mention of complication, not stated as uncontrolled     Urinary incontinence     frequent UTI s/p infection, surgical dilatation lead to incontinence    Wears dentures     full on top, partial on bottom    Wears glasses      Past Surgical History:   Procedure Laterality Date    APPENDECTOMY      BLADDER SURGERY      bladder stimulator    BLADDER SUSPENSION      multiple    CARDIAC CATHETERIZATION      no stents    CARDIOVASCULAR STRESS TEST  2014    CATARACT REMOVAL WITH IMPLANT Left 2017    Tyree/Sulaiman    CATARACT REMOVAL WITH IMPLANT Right 2017  pain.  Gait Deviations: Decreased step length;Decreased step height  Distance: 20 feet in room to bathroom  Comments: distance and quality of gait limited by pain.  More Ambulation?: No (deferred PM gait due to increased left knee pain and edema)    PT Exercises  Exercise Equipment: [employer] 25 min L6    ASSESSMENT       Activity Tolerance  Activity Tolerance: Patient limited by pain  Activity Tolerance Comments: left knee pain         PLAN OF CARE  Frequency: 1-2 treatment sessions per day, 5-7 days per week       EDUCATION  Education  Education Given To: Patient  Education Provided: Mobility Training;Transfer Training;Safety;Fall Prevention Strategies  Education Provided Comments: car transfer  Education Method: Demonstration;Verbal  Education Outcome: Continued education needed         Therapy Time   Individual Concurrent Group Co-treatment   Time In 1020         Time Out 1110         Minutes 50                   Ramila Mcdonald PTA, 01/09/24 at 3:16 PM

## 2024-01-09 NOTE — PROGRESS NOTES
ACUTE INPATIENT REHABILITATION DISCHARGE  [facility]    Patient Name: Komal Crawford  MRN: [id number]     Patient discharged in stable condition as per order of attending physician.     AVS provided by nurse at time of discharge, which includes all necessary medical information pertaining to the patients current course of illness, treatment, medications, post-discharge goals of care, and treatment preferences.     Provision of Current Reconciled Medication List to Patient at Discharge   Indicate the route(s) of transmission of the current reconciled medication list to the patient/family/caregiver. Verbal (e.g. in person, telephone, video conferencing) and Paper Based (e.g. fax, copies, print outs)     Availability of \"My Chart\" offered to patient as a tool for updated health record.  Steps for activation discussed with patient as mentioned on AVS.      Patient/responsible party verbalize understanding of discharge plan and are in agreement with goal/plan/treatment preferences.     Belongings including  clothing, cell phone, , shoes, pajamas  sent with patient/responsible party.      Home medications sent home with patient/responsible party no    Car Transfer   Level of assistance required for patient transfer into vehicle:   PARTIAL/MODERATE ASSISTANCE: [place] does LESS THAN HALF the effort. [place] lifts, holds or supports trunk or limbs, but provides less than half the effort.     High-Risk Drug Classes: Use and Indication   Check if the patient is taking any medications by pharmacological classification If yes, check if there is an indication noted for all meds in the drug class   Antipsychotic No If yes: indication noted?  [] If no indication noted, follow up with provider for order clarification   Anticoagulant Yes If yes: indication noted?  []    Antibiotic No If yes: indication noted?  []    Opioid No If yes: indication noted?  []    Antiplatelet Yes If yes: indication noted?  []    Hypoglycemic  dry, intact, no bleeding and no hematoma.

## 2024-01-10 ENCOUNTER — CARE COORDINATION (OUTPATIENT)
Dept: CARE COORDINATION | Age: 84
End: 2024-01-10

## 2024-01-10 ENCOUNTER — OFFICE VISIT (OUTPATIENT)
Dept: ORTHOPEDIC SURGERY | Age: 84
End: 2024-01-10
Payer: MEDICARE

## 2024-01-10 VITALS — WEIGHT: 158.73 LBS | BODY MASS INDEX: 34.24 KG/M2 | RESPIRATION RATE: 14 BRPM | HEIGHT: 57 IN

## 2024-01-10 DIAGNOSIS — Z96.652 HISTORY OF TOTAL KNEE ARTHROPLASTY, LEFT: ICD-10-CM

## 2024-01-10 DIAGNOSIS — M25.562 ACUTE PAIN OF LEFT KNEE: Primary | ICD-10-CM

## 2024-01-10 PROCEDURE — G8400 PT W/DXA NO RESULTS DOC: HCPCS | Performed by: PHYSICIAN ASSISTANT

## 2024-01-10 PROCEDURE — 1036F TOBACCO NON-USER: CPT | Performed by: PHYSICIAN ASSISTANT

## 2024-01-10 PROCEDURE — 1111F DSCHRG MED/CURRENT MED MERGE: CPT | Performed by: PHYSICIAN ASSISTANT

## 2024-01-10 PROCEDURE — G8427 DOCREV CUR MEDS BY ELIG CLIN: HCPCS | Performed by: PHYSICIAN ASSISTANT

## 2024-01-10 PROCEDURE — 1123F ACP DISCUSS/DSCN MKR DOCD: CPT | Performed by: PHYSICIAN ASSISTANT

## 2024-01-10 PROCEDURE — G8484 FLU IMMUNIZE NO ADMIN: HCPCS | Performed by: PHYSICIAN ASSISTANT

## 2024-01-10 PROCEDURE — 1090F PRES/ABSN URINE INCON ASSESS: CPT | Performed by: PHYSICIAN ASSISTANT

## 2024-01-10 PROCEDURE — G8417 CALC BMI ABV UP PARAM F/U: HCPCS | Performed by: PHYSICIAN ASSISTANT

## 2024-01-10 PROCEDURE — 99214 OFFICE O/P EST MOD 30 MIN: CPT | Performed by: PHYSICIAN ASSISTANT

## 2024-01-10 RX ORDER — TIZANIDINE 2 MG/1
2 TABLET ORAL NIGHTLY PRN
Qty: 10 TABLET | Refills: 0 | Status: SHIPPED | OUTPATIENT
Start: 2024-01-10

## 2024-01-10 NOTE — PROGRESS NOTES
[facility]                   Benja Cedillo PA-C            [address], [address]               [place], [place] [address]           Dept Phone: [phone]           Dept Fax:  [phone] [address] [facility]                       [address]           [place], [place] [address]          Dept Phone: [phone]           Dept Fax:  [phone]      Chief Compliant:  Chief Complaint   Patient presents with    Knee Pain     left        History of Present Illness:  Komal returns today.  This is a 83 y.o. female with history of left revision total knee arthroplasty who presents to the clinic today for evaluation of acute left knee pain after an initial fall that occurred on 12/24/2023 where she was evaluated in the ED and eventually admitted to inpatient rehab due to inability to bear weight.  She had extensive work x-ray and CT of the hip as well as x-ray of the knee with that were negative for any acute fracture.  She was discharged after a 1 week stay in the rehab unit and recommended follow-up with orthopedics for further evaluation.  She reports her hip is overall doing much better but pain is most severe to the anterior knee.    Her main concern is that she is having difficulty standing from seated position without this left knee giving way.  She does note 2 falls while in the rehab facility but did have an x-ray on 1/6/2024 and has had no full new fall since then.    Patient denies any fever, chills, nausea or vomiting      Review of Systems   Constitutional: Negative for fever, chills, sweats, recent illness, or recent injury.   Neurological: Negative for headaches, numbness, or weakness.   Integumentary: Negative for rash, itching, ecchymosis, abrasions, or laceration.   Musculoskeletal: Positive for Knee Pain (left)       Physical Exam:  Constitutional: Patient is oriented to person, place, and time. Patient appears well-developed and well nourished.

## 2024-01-11 NOTE — CARE COORDINATION
Ambulatory Care Coordination Note  2024    Patient Current Location:  Home: [address]  [address]  [place] [facility] [address]     ACM contacted the patient by telephone. Verified name and  with patient as identifiers.      Admission - for left hip and back pain, UTI, went to Acute Rehab unit - for therapy.  Left hip pain is better but having a lot of left knee pain and foot pain. Saw Dr. Cedillo yesterday and sees Dr. Neville for foot plantar fasciitis next week as well as Dr. Bonner for post op right hip.  Still having hard time getting in and out of car so is waiting a few weeks after all her appts before goes back to outpatient PT.  DM- didn't give any blood sugars but they are \"fine\".  No dyspnea or chest pain, feels good besides the orthopedic issues. No falls since home, using walker.  CC Plan:   -Follow up next week to review progress notes, if scheduled physical therapy, review blood sugars.  Diabetes Assessment    Medic Alert ID: No  Meal Planning: Avoidance of concentrated sweets   How often do you test your blood sugar?: Daily   Do you have barriers with adherence to non-pharmacologic self-management interventions? (Nutrition/Exercise/Self-Monitoring): Yes   Have you ever had to go to the ED for symptoms of low blood sugar?: No       No patient-reported symptoms   Do you have hyperglycemia symptoms?: No   Do you have hypoglycemia symptoms?: No   Blood Sugar Monitoring Regimen: Once a Day, Morning Fasting   Blood Sugar Trends: No Change         and   General Assessment    Do you have any symptoms that are causing concern?: Yes  Reported Symptoms: Pain (Comment: Left knee and left foot pain)             Offered patient enrollment in the Remote Patient Monitoring (RPM) program for in-home monitoring: Patient declined.    Lab Results       None                 Goals Addressed                   This Visit's Progress     Conditions and Symptoms   On track     I will schedule office visits, as

## 2024-01-12 ENCOUNTER — TELEPHONE (OUTPATIENT)
Dept: ORTHOPEDIC SURGERY | Age: 84
End: 2024-01-12

## 2024-01-12 DIAGNOSIS — M79.672 LEFT FOOT PAIN: Primary | ICD-10-CM

## 2024-01-12 RX ORDER — METHYLPREDNISOLONE 4 MG/1
TABLET ORAL
Qty: 1 KIT | Refills: 0 | Status: SHIPPED | OUTPATIENT
Start: 2024-01-12 | End: 2024-01-18

## 2024-01-12 NOTE — TELEPHONE ENCOUNTER
Patient was actually placed in a knee brace.  She is currently taking tramadol as provided by the ED and was started on tizanidine.  Recommended to continue with physical therapy.  Can trial Medrol Dosepak if patient wishes to help with swelling and discomfort in addition to the medication she is already taking.

## 2024-01-12 NOTE — TELEPHONE ENCOUNTER
5- Patient put in ICU bed from PCU. Plan to intubate and get a central line. Patient RR 30-40,  
2015- Became tachycardic. 2035-Intubated. 2126-CVP placement complete. RIJ. 2nd attempt. 2200- Resting on vent, propofol at 25mgc, zeke at 80. 
0000- Unable to wean pressors due to soft BP. Diastolic running low. SBP around 100.  
0610- Failed SAT/ SBT. RR in 40's. Anxious, agitated. 0700- Report given. Patient and chart visited. I spoke with patient on medication and Benja Nguyen recommendations, patient voiced understanding.  Patient was ok with the medrol dose pack   script sent to her pharmacy

## 2024-01-12 NOTE — TELEPHONE ENCOUNTER
Patient was seen in the office yesterday and was fitted for a boot  and she is reporting excruiating pain and cannot walk please advise

## 2024-01-16 ENCOUNTER — OFFICE VISIT (OUTPATIENT)
Dept: ORTHOPEDIC SURGERY | Age: 84
End: 2024-01-16

## 2024-01-16 VITALS — RESPIRATION RATE: 14 BRPM | HEIGHT: 57 IN | BODY MASS INDEX: 34.09 KG/M2 | WEIGHT: 158 LBS

## 2024-01-16 DIAGNOSIS — M16.12 ARTHRITIS OF LEFT HIP: Primary | ICD-10-CM

## 2024-01-16 DIAGNOSIS — M25.552 LEFT HIP PAIN: ICD-10-CM

## 2024-01-16 DIAGNOSIS — M54.50 LOW BACK PAIN, UNSPECIFIED BACK PAIN LATERALITY, UNSPECIFIED CHRONICITY, UNSPECIFIED WHETHER SCIATICA PRESENT: ICD-10-CM

## 2024-01-16 PROCEDURE — 99024 POSTOP FOLLOW-UP VISIT: CPT | Performed by: ORTHOPAEDIC SURGERY

## 2024-01-16 NOTE — PROGRESS NOTES
(PRESERVISION AREDS PO), Take by mouth daily , Disp: , Rfl:     Continuous Blood Gluc  (FREESTYLE DAVIS 14 DAY READER) MATTHEW, , Disp: , Rfl:     CRANBERRY PO, Take by mouth 2 times daily, Disp: , Rfl:   Allergies   Allergen Reactions    Iodides Anaphylaxis, Hives and Itching     \"Contrast dyes\"  This was added by someone but Patient states has had IVP dyes multiple times without problems and had a myelogram in Dec 2016 without problems    Iodinated Contrast Media Anaphylaxis, Hives and Itching    Povidone-Iodine Anaphylaxis, Hives and Swelling    Sulfa Antibiotics Hives and Swelling     Other reaction(s): Unknown  Other reaction(s): Intolerance-unknown  Hands, feet, mouth, eyes  Other reaction(s): Unknown    Betadine [Povidone Iodine] Hives    Cephalexin Hives and Swelling    Cephalexin Itching     Other reaction(s): Hallucinations  In 1969 received demerol and keflex together so was told to list both as allergies    Cozaar [Losartan] Nausea Only     Pt also gets sores on toungue    Cymbalta [Duloxetine Hcl] Diarrhea and Nausea And Vomiting     Weakness    Demerol Hives and Swelling    Doxycycline Other (See Comments)     Sore mouth        Duloxetine Diarrhea, Other (See Comments), Nausea And Vomiting and Nausea Only     Other reaction(s): Other (See Comments)  Weakness, diarrhea  Weakness      Meperidine Itching     Other reaction(s): Hallucinations      Morphine Hives and Itching    Penicillins Hives, Swelling and Itching     Other reaction(s): Intolerance-unknown  Other reaction(s): Unknown    Zithromax [Azithromycin] Other (See Comments)     Sore mouth      Ciprofloxacin      Pt stated it gave her canker sores    Clindamycin/Lincomycin     Etodolac     Fesoterodine      not sure      Fluorescein     Iodine      Other reaction(s): Intolerance-unknown    Lisinopril      cough    Penicillin G     Toviaz [Fesoterodine Fumarate Er]     Clonazepam Other (See Comments)     From neuro: made her too sleepy

## 2024-01-17 ENCOUNTER — HOSPITAL ENCOUNTER (INPATIENT)
Age: 84
LOS: 7 days | Discharge: NURSING, REHAB OR LONG-TERM CARE | DRG: 462 | End: 2024-01-26
Attending: EMERGENCY MEDICINE | Admitting: ORTHOPAEDIC SURGERY
Payer: MEDICARE

## 2024-01-17 ENCOUNTER — APPOINTMENT (OUTPATIENT)
Dept: GENERAL RADIOLOGY | Age: 84
DRG: 462 | End: 2024-01-17
Payer: MEDICARE

## 2024-01-17 DIAGNOSIS — M16.12 OSTEOARTHRITIS OF LEFT HIP, UNSPECIFIED OSTEOARTHRITIS TYPE: ICD-10-CM

## 2024-01-17 DIAGNOSIS — R26.2 INABILITY TO AMBULATE DUE TO HIP: ICD-10-CM

## 2024-01-17 DIAGNOSIS — M25.552 LEFT HIP PAIN: Primary | ICD-10-CM

## 2024-01-17 DIAGNOSIS — M97.12XD PERIPROSTHETIC FRACTURE AROUND INTERNAL PROSTHETIC LEFT KNEE JOINT, SUBSEQUENT ENCOUNTER: ICD-10-CM

## 2024-01-17 DIAGNOSIS — S72.001A CLOSED DISPLACED FRACTURE OF RIGHT FEMORAL NECK (HCC): ICD-10-CM

## 2024-01-17 PROCEDURE — 80048 BASIC METABOLIC PNL TOTAL CA: CPT

## 2024-01-17 PROCEDURE — 96375 TX/PRO/DX INJ NEW DRUG ADDON: CPT

## 2024-01-17 PROCEDURE — 36415 COLL VENOUS BLD VENIPUNCTURE: CPT

## 2024-01-17 PROCEDURE — 99285 EMERGENCY DEPT VISIT HI MDM: CPT

## 2024-01-17 PROCEDURE — 6360000002 HC RX W HCPCS

## 2024-01-17 PROCEDURE — 85025 COMPLETE CBC W/AUTO DIFF WBC: CPT

## 2024-01-17 PROCEDURE — 96374 THER/PROPH/DIAG INJ IV PUSH: CPT

## 2024-01-17 PROCEDURE — 85610 PROTHROMBIN TIME: CPT

## 2024-01-17 PROCEDURE — 85730 THROMBOPLASTIN TIME PARTIAL: CPT

## 2024-01-17 PROCEDURE — 73502 X-RAY EXAM HIP UNI 2-3 VIEWS: CPT

## 2024-01-17 PROCEDURE — 73552 X-RAY EXAM OF FEMUR 2/>: CPT

## 2024-01-17 RX ORDER — FENTANYL CITRATE 0.05 MG/ML
50 INJECTION, SOLUTION INTRAMUSCULAR; INTRAVENOUS ONCE
Status: COMPLETED | OUTPATIENT
Start: 2024-01-17 | End: 2024-01-17

## 2024-01-17 RX ADMIN — FENTANYL CITRATE 50 MCG: 0.05 INJECTION, SOLUTION INTRAMUSCULAR; INTRAVENOUS at 23:50

## 2024-01-17 ASSESSMENT — PAIN - FUNCTIONAL ASSESSMENT: PAIN_FUNCTIONAL_ASSESSMENT: 0-10

## 2024-01-17 ASSESSMENT — PAIN DESCRIPTION - DESCRIPTORS: DESCRIPTORS: ACHING

## 2024-01-17 ASSESSMENT — PAIN DESCRIPTION - LOCATION: LOCATION: HIP

## 2024-01-17 ASSESSMENT — PAIN SCALES - GENERAL: PAINLEVEL_OUTOF10: 10

## 2024-01-17 ASSESSMENT — PAIN DESCRIPTION - ORIENTATION: ORIENTATION: LEFT

## 2024-01-18 PROBLEM — M81.0 AGE RELATED OSTEOPOROSIS: Status: ACTIVE | Noted: 2024-01-18

## 2024-01-18 PROBLEM — M25.552 LEFT HIP PAIN: Status: ACTIVE | Noted: 2024-01-18
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
## 2024-01-18 LAB
ANION GAP SERPL CALCULATED.3IONS-SCNC: 13 MMOL/L (ref 9–17)
ANION GAP SERPL CALCULATED.3IONS-SCNC: 15 MMOL/L (ref 9–17)
BASOPHILS # BLD: 0 K/UL (ref 0–0.2)
BASOPHILS NFR BLD: 0 % (ref 0–2)
BUN SERPL-MCNC: 34 MG/DL (ref 8–23)
BUN SERPL-MCNC: 37 MG/DL (ref 8–23)
CALCIUM SERPL-MCNC: 8.9 MG/DL (ref 8.6–10.4)
CALCIUM SERPL-MCNC: 9.5 MG/DL (ref 8.6–10.4)
CHLORIDE SERPL-SCNC: 102 MMOL/L (ref 98–107)
CHLORIDE SERPL-SCNC: 106 MMOL/L (ref 98–107)
CO2 SERPL-SCNC: 20 MMOL/L (ref 20–31)
CO2 SERPL-SCNC: 21 MMOL/L (ref 20–31)
CREAT SERPL-MCNC: 0.9 MG/DL (ref 0.5–0.9)
CREAT SERPL-MCNC: 1 MG/DL (ref 0.5–0.9)
EOSINOPHIL # BLD: 0 K/UL (ref 0–0.4)
EOSINOPHILS RELATIVE PERCENT: 0 % (ref 0–4)
ERYTHROCYTE [DISTWIDTH] IN BLOOD BY AUTOMATED COUNT: 16.2 % (ref 11.5–14.9)
GFR SERPL CREATININE-BSD FRML MDRD: 56 ML/MIN/1.73M2
GFR SERPL CREATININE-BSD FRML MDRD: >60 ML/MIN/1.73M2
GLUCOSE BLD-MCNC: 157 MG/DL (ref 65–105)
GLUCOSE BLD-MCNC: 195 MG/DL (ref 65–105)
GLUCOSE BLD-MCNC: 238 MG/DL (ref 65–105)
GLUCOSE BLD-MCNC: 268 MG/DL (ref 65–105)
GLUCOSE SERPL-MCNC: 225 MG/DL (ref 70–99)
GLUCOSE SERPL-MCNC: 246 MG/DL (ref 70–99)
HCT VFR BLD AUTO: 32.1 % (ref 36–46)
HGB BLD-MCNC: 10 G/DL (ref 12–16)
INR PPP: 1.1
LYMPHOCYTES NFR BLD: 1.31 K/UL (ref 1–4.8)
LYMPHOCYTES RELATIVE PERCENT: 13 % (ref 24–44)
MCH RBC QN AUTO: 27.1 PG (ref 26–34)
MCHC RBC AUTO-ENTMCNC: 31.2 G/DL (ref 31–37)
MCV RBC AUTO: 86.9 FL (ref 80–100)
MONOCYTES NFR BLD: 0.61 K/UL (ref 0.1–1.3)
MONOCYTES NFR BLD: 6 % (ref 1–7)
MORPHOLOGY: ABNORMAL
NEUTROPHILS NFR BLD: 81 % (ref 36–66)
NEUTS SEG NFR BLD: 8.18 K/UL (ref 1.3–9.1)
PARTIAL THROMBOPLASTIN TIME: 25.5 SEC (ref 24–36)
PLATELET # BLD AUTO: 192 K/UL (ref 150–450)
PMV BLD AUTO: 12.8 FL (ref 6–12)
POTASSIUM SERPL-SCNC: 4 MMOL/L (ref 3.7–5.3)
POTASSIUM SERPL-SCNC: 4 MMOL/L (ref 3.7–5.3)
PROTHROMBIN TIME: 14.3 SEC (ref 11.8–14.6)
RBC # BLD AUTO: 3.69 M/UL (ref 4–5.2)
SODIUM SERPL-SCNC: 138 MMOL/L (ref 135–144)
SODIUM SERPL-SCNC: 139 MMOL/L (ref 135–144)
WBC OTHER # BLD: 10.1 K/UL (ref 3.5–11)

## 2024-01-18 PROCEDURE — 6360000002 HC RX W HCPCS: Performed by: NURSE PRACTITIONER

## 2024-01-18 PROCEDURE — 96375 TX/PRO/DX INJ NEW DRUG ADDON: CPT

## 2024-01-18 PROCEDURE — G0378 HOSPITAL OBSERVATION PER HR: HCPCS

## 2024-01-18 PROCEDURE — 6370000000 HC RX 637 (ALT 250 FOR IP): Performed by: NURSE PRACTITIONER

## 2024-01-18 PROCEDURE — 96376 TX/PRO/DX INJ SAME DRUG ADON: CPT

## 2024-01-18 PROCEDURE — 80048 BASIC METABOLIC PNL TOTAL CA: CPT

## 2024-01-18 PROCEDURE — 6360000002 HC RX W HCPCS

## 2024-01-18 PROCEDURE — 82947 ASSAY GLUCOSE BLOOD QUANT: CPT

## 2024-01-18 PROCEDURE — 2580000003 HC RX 258: Performed by: NURSE PRACTITIONER

## 2024-01-18 PROCEDURE — 99223 1ST HOSP IP/OBS HIGH 75: CPT | Performed by: INTERNAL MEDICINE

## 2024-01-18 PROCEDURE — 6370000000 HC RX 637 (ALT 250 FOR IP): Performed by: INTERNAL MEDICINE

## 2024-01-18 PROCEDURE — 36415 COLL VENOUS BLD VENIPUNCTURE: CPT

## 2024-01-18 PROCEDURE — 99222 1ST HOSP IP/OBS MODERATE 55: CPT | Performed by: ORTHOPAEDIC SURGERY

## 2024-01-18 RX ORDER — POTASSIUM CHLORIDE 7.45 MG/ML
10 INJECTION INTRAVENOUS PRN
Status: DISCONTINUED | OUTPATIENT
Start: 2024-01-18 | End: 2024-01-26 | Stop reason: HOSPADM

## 2024-01-18 RX ORDER — ONDANSETRON 2 MG/ML
4 INJECTION INTRAMUSCULAR; INTRAVENOUS EVERY 6 HOURS PRN
Status: DISCONTINUED | OUTPATIENT
Start: 2024-01-18 | End: 2024-01-26 | Stop reason: HOSPADM

## 2024-01-18 RX ORDER — ISOSORBIDE MONONITRATE 30 MG/1
30 TABLET, EXTENDED RELEASE ORAL EVERY EVENING
Status: DISCONTINUED | OUTPATIENT
Start: 2024-01-18 | End: 2024-01-26 | Stop reason: HOSPADM

## 2024-01-18 RX ORDER — DEXTROSE MONOHYDRATE 100 MG/ML
INJECTION, SOLUTION INTRAVENOUS CONTINUOUS PRN
Status: DISCONTINUED | OUTPATIENT
Start: 2024-01-18 | End: 2024-01-26 | Stop reason: HOSPADM

## 2024-01-18 RX ORDER — ACETAMINOPHEN 650 MG/1
650 SUPPOSITORY RECTAL EVERY 6 HOURS PRN
Status: DISCONTINUED | OUTPATIENT
Start: 2024-01-18 | End: 2024-01-26 | Stop reason: HOSPADM

## 2024-01-18 RX ORDER — PANTOPRAZOLE SODIUM 40 MG/1
40 TABLET, DELAYED RELEASE ORAL
Status: DISCONTINUED | OUTPATIENT
Start: 2024-01-18 | End: 2024-01-26 | Stop reason: HOSPADM

## 2024-01-18 RX ORDER — NITROFURANTOIN MACROCRYSTALS 100 MG/1
100 CAPSULE ORAL EVERY 12 HOURS SCHEDULED
Status: DISCONTINUED | OUTPATIENT
Start: 2024-01-18 | End: 2024-01-18

## 2024-01-18 RX ORDER — FENTANYL CITRATE 0.05 MG/ML
50 INJECTION, SOLUTION INTRAMUSCULAR; INTRAVENOUS
Status: DISCONTINUED | OUTPATIENT
Start: 2024-01-18 | End: 2024-01-18

## 2024-01-18 RX ORDER — INSULIN LISPRO 100 [IU]/ML
0-4 INJECTION, SOLUTION INTRAVENOUS; SUBCUTANEOUS NIGHTLY
Status: DISCONTINUED | OUTPATIENT
Start: 2024-01-18 | End: 2024-01-26 | Stop reason: HOSPADM

## 2024-01-18 RX ORDER — BISACODYL 5 MG/1
5 TABLET, DELAYED RELEASE ORAL 2 TIMES DAILY PRN
Status: DISCONTINUED | OUTPATIENT
Start: 2024-01-18 | End: 2024-01-26 | Stop reason: HOSPADM

## 2024-01-18 RX ORDER — FENTANYL CITRATE 0.05 MG/ML
50 INJECTION, SOLUTION INTRAMUSCULAR; INTRAVENOUS
Status: DISCONTINUED | OUTPATIENT
Start: 2024-01-18 | End: 2024-01-25

## 2024-01-18 RX ORDER — SODIUM CHLORIDE 0.9 % (FLUSH) 0.9 %
5-40 SYRINGE (ML) INJECTION EVERY 12 HOURS SCHEDULED
Status: DISCONTINUED | OUTPATIENT
Start: 2024-01-18 | End: 2024-01-26 | Stop reason: HOSPADM

## 2024-01-18 RX ORDER — SODIUM CHLORIDE 9 MG/ML
INJECTION, SOLUTION INTRAVENOUS PRN
Status: DISCONTINUED | OUTPATIENT
Start: 2024-01-18 | End: 2024-01-26 | Stop reason: HOSPADM

## 2024-01-18 RX ORDER — CLOPIDOGREL BISULFATE 75 MG/1
75 TABLET ORAL DAILY
Status: DISCONTINUED | OUTPATIENT
Start: 2024-01-18 | End: 2024-01-20

## 2024-01-18 RX ORDER — MAGNESIUM SULFATE HEPTAHYDRATE 40 MG/ML
2000 INJECTION, SOLUTION INTRAVENOUS PRN
Status: DISCONTINUED | OUTPATIENT
Start: 2024-01-18 | End: 2024-01-26 | Stop reason: HOSPADM

## 2024-01-18 RX ORDER — ACETAMINOPHEN 325 MG/1
650 TABLET ORAL EVERY 6 HOURS PRN
Status: CANCELLED | OUTPATIENT
Start: 2024-01-18

## 2024-01-18 RX ORDER — ACETAMINOPHEN 500 MG
1000 TABLET ORAL EVERY 6 HOURS PRN
Status: DISCONTINUED | OUTPATIENT
Start: 2024-01-18 | End: 2024-01-26 | Stop reason: HOSPADM

## 2024-01-18 RX ORDER — INSULIN LISPRO 100 [IU]/ML
0-8 INJECTION, SOLUTION INTRAVENOUS; SUBCUTANEOUS
Status: DISCONTINUED | OUTPATIENT
Start: 2024-01-18 | End: 2024-01-26 | Stop reason: HOSPADM

## 2024-01-18 RX ORDER — RANOLAZINE 500 MG/1
500 TABLET, EXTENDED RELEASE ORAL 2 TIMES DAILY
Status: DISCONTINUED | OUTPATIENT
Start: 2024-01-18 | End: 2024-01-26 | Stop reason: HOSPADM

## 2024-01-18 RX ORDER — ONDANSETRON 4 MG/1
4 TABLET, ORALLY DISINTEGRATING ORAL EVERY 8 HOURS PRN
Status: DISCONTINUED | OUTPATIENT
Start: 2024-01-18 | End: 2024-01-26 | Stop reason: HOSPADM

## 2024-01-18 RX ORDER — POTASSIUM CHLORIDE 20 MEQ/1
40 TABLET, EXTENDED RELEASE ORAL PRN
Status: DISCONTINUED | OUTPATIENT
Start: 2024-01-18 | End: 2024-01-26 | Stop reason: HOSPADM

## 2024-01-18 RX ORDER — ASPIRIN 81 MG/1
81 TABLET ORAL DAILY
Status: DISCONTINUED | OUTPATIENT
Start: 2024-01-18 | End: 2024-01-20

## 2024-01-18 RX ORDER — INSULIN GLARGINE 100 [IU]/ML
10 INJECTION, SOLUTION SUBCUTANEOUS NIGHTLY
Status: DISCONTINUED | OUTPATIENT
Start: 2024-01-18 | End: 2024-01-19

## 2024-01-18 RX ORDER — GLIPIZIDE 5 MG/1
5 TABLET ORAL
Status: DISCONTINUED | OUTPATIENT
Start: 2024-01-19 | End: 2024-01-26 | Stop reason: HOSPADM

## 2024-01-18 RX ORDER — OXYCODONE HYDROCHLORIDE 5 MG/1
5 TABLET ORAL EVERY 4 HOURS PRN
Status: DISCONTINUED | OUTPATIENT
Start: 2024-01-18 | End: 2024-01-21

## 2024-01-18 RX ORDER — TIZANIDINE 2 MG/1
2 TABLET ORAL NIGHTLY PRN
Status: DISCONTINUED | OUTPATIENT
Start: 2024-01-18 | End: 2024-01-26 | Stop reason: HOSPADM

## 2024-01-18 RX ORDER — ROPINIROLE 2 MG/1
4 TABLET, FILM COATED ORAL NIGHTLY
Status: DISCONTINUED | OUTPATIENT
Start: 2024-01-18 | End: 2024-01-26 | Stop reason: HOSPADM

## 2024-01-18 RX ORDER — SODIUM CHLORIDE 0.9 % (FLUSH) 0.9 %
5-40 SYRINGE (ML) INJECTION PRN
Status: DISCONTINUED | OUTPATIENT
Start: 2024-01-18 | End: 2024-01-26 | Stop reason: HOSPADM

## 2024-01-18 RX ORDER — ENOXAPARIN SODIUM 100 MG/ML
40 INJECTION SUBCUTANEOUS DAILY
Status: DISCONTINUED | OUTPATIENT
Start: 2024-01-18 | End: 2024-01-20

## 2024-01-18 RX ORDER — ATOMOXETINE 40 MG/1
80 CAPSULE ORAL NIGHTLY
COMMUNITY
End: 2024-01-18 | Stop reason: CLARIF

## 2024-01-18 RX ORDER — METOPROLOL SUCCINATE 25 MG/1
12.5 TABLET, EXTENDED RELEASE ORAL EVERY EVENING
Status: DISCONTINUED | OUTPATIENT
Start: 2024-01-18 | End: 2024-01-26 | Stop reason: HOSPADM

## 2024-01-18 RX ORDER — POLYETHYLENE GLYCOL 3350 17 G/17G
17 POWDER, FOR SOLUTION ORAL DAILY PRN
Status: DISCONTINUED | OUTPATIENT
Start: 2024-01-18 | End: 2024-01-24

## 2024-01-18 RX ORDER — NITROFURANTOIN 25; 75 MG/1; MG/1
100 CAPSULE ORAL NIGHTLY
Status: DISCONTINUED | OUTPATIENT
Start: 2024-01-18 | End: 2024-01-21

## 2024-01-18 RX ORDER — ATORVASTATIN CALCIUM 10 MG/1
10 TABLET, FILM COATED ORAL NIGHTLY
Status: DISCONTINUED | OUTPATIENT
Start: 2024-01-18 | End: 2024-01-26 | Stop reason: HOSPADM

## 2024-01-18 RX ORDER — PRIMIDONE 50 MG/1
50 TABLET ORAL 2 TIMES DAILY
Status: DISCONTINUED | OUTPATIENT
Start: 2024-01-18 | End: 2024-01-26 | Stop reason: HOSPADM

## 2024-01-18 RX ORDER — KETOROLAC TROMETHAMINE 30 MG/ML
15 INJECTION, SOLUTION INTRAMUSCULAR; INTRAVENOUS ONCE
Status: COMPLETED | OUTPATIENT
Start: 2024-01-18 | End: 2024-01-18

## 2024-01-18 RX ORDER — AMLODIPINE BESYLATE 2.5 MG/1
2.5 TABLET ORAL EVERY MORNING
Status: DISCONTINUED | OUTPATIENT
Start: 2024-01-18 | End: 2024-01-26 | Stop reason: HOSPADM

## 2024-01-18 RX ADMIN — PRIMIDONE 50 MG: 50 TABLET ORAL at 21:18

## 2024-01-18 RX ADMIN — ASPIRIN 81 MG: 81 TABLET, COATED ORAL at 08:37

## 2024-01-18 RX ADMIN — SODIUM CHLORIDE, PRESERVATIVE FREE 10 ML: 5 INJECTION INTRAVENOUS at 21:19

## 2024-01-18 RX ADMIN — NITROFURANTOIN MONOHYDRATE/MACROCRYSTALS 100 MG: 75; 25 CAPSULE ORAL at 21:18

## 2024-01-18 RX ADMIN — OXYCODONE HYDROCHLORIDE 5 MG: 5 TABLET ORAL at 02:56

## 2024-01-18 RX ADMIN — FENTANYL CITRATE 50 MCG: 0.05 INJECTION, SOLUTION INTRAMUSCULAR; INTRAVENOUS at 18:06

## 2024-01-18 RX ADMIN — SODIUM CHLORIDE, PRESERVATIVE FREE 10 ML: 5 INJECTION INTRAVENOUS at 08:41

## 2024-01-18 RX ADMIN — INSULIN LISPRO 4 UNITS: 100 INJECTION, SOLUTION INTRAVENOUS; SUBCUTANEOUS at 11:31

## 2024-01-18 RX ADMIN — OXYCODONE HYDROCHLORIDE 5 MG: 5 TABLET ORAL at 21:23

## 2024-01-18 RX ADMIN — ENOXAPARIN SODIUM 40 MG: 40 INJECTION SUBCUTANEOUS at 08:37

## 2024-01-18 RX ADMIN — OXYCODONE HYDROCHLORIDE 5 MG: 5 TABLET ORAL at 08:37

## 2024-01-18 RX ADMIN — INSULIN GLARGINE 10 UNITS: 100 INJECTION, SOLUTION SUBCUTANEOUS at 21:18

## 2024-01-18 RX ADMIN — FENTANYL CITRATE 50 MCG: 0.05 INJECTION, SOLUTION INTRAMUSCULAR; INTRAVENOUS at 06:26

## 2024-01-18 RX ADMIN — KETOROLAC TROMETHAMINE 15 MG: 30 INJECTION, SOLUTION INTRAMUSCULAR; INTRAVENOUS at 02:59

## 2024-01-18 RX ADMIN — INSULIN LISPRO 2 UNITS: 100 INJECTION, SOLUTION INTRAVENOUS; SUBCUTANEOUS at 08:37

## 2024-01-18 RX ADMIN — HYDROMORPHONE HYDROCHLORIDE 0.25 MG: 1 INJECTION, SOLUTION INTRAMUSCULAR; INTRAVENOUS; SUBCUTANEOUS at 00:50

## 2024-01-18 RX ADMIN — CLOPIDOGREL BISULFATE 75 MG: 75 TABLET ORAL at 08:37

## 2024-01-18 RX ADMIN — PANTOPRAZOLE SODIUM 40 MG: 40 TABLET, DELAYED RELEASE ORAL at 06:31

## 2024-01-18 RX ADMIN — AMLODIPINE BESYLATE 2.5 MG: 2.5 TABLET ORAL at 08:37

## 2024-01-18 RX ADMIN — ISOSORBIDE MONONITRATE 30 MG: 30 TABLET, EXTENDED RELEASE ORAL at 17:54

## 2024-01-18 RX ADMIN — METOPROLOL SUCCINATE 12.5 MG: 25 TABLET, EXTENDED RELEASE ORAL at 17:54

## 2024-01-18 RX ADMIN — RANOLAZINE 500 MG: 500 TABLET, EXTENDED RELEASE ORAL at 21:18

## 2024-01-18 RX ADMIN — ROPINIROLE HYDROCHLORIDE 4 MG: 2 TABLET, FILM COATED ORAL at 21:18

## 2024-01-18 RX ADMIN — RANOLAZINE 500 MG: 500 TABLET, EXTENDED RELEASE ORAL at 08:37

## 2024-01-18 RX ADMIN — PRIMIDONE 50 MG: 50 TABLET ORAL at 08:40

## 2024-01-18 RX ADMIN — ATORVASTATIN CALCIUM 10 MG: 10 TABLET, FILM COATED ORAL at 21:18

## 2024-01-18 ASSESSMENT — PAIN SCALES - GENERAL
PAINLEVEL_OUTOF10: 10
PAINLEVEL_OUTOF10: 8
PAINLEVEL_OUTOF10: 7
PAINLEVEL_OUTOF10: 8

## 2024-01-18 ASSESSMENT — PAIN DESCRIPTION - LOCATION
LOCATION: FOOT
LOCATION: HIP
LOCATION: HIP;LEG
LOCATION: HIP

## 2024-01-18 ASSESSMENT — PAIN DESCRIPTION - ORIENTATION
ORIENTATION: LEFT

## 2024-01-18 ASSESSMENT — PAIN DESCRIPTION - DESCRIPTORS: DESCRIPTORS: SHARP;BURNING

## 2024-01-18 NOTE — PROGRESS NOTES
[facility]   OCCUPATIONAL THERAPY MISSED TREATMENT NOTE   INPATIENT   Date: 24  Patient Name: Komal Crawford       Room:   MRN: [id number]   Account #: [id number]    : 1940  (83 y.o.)  Gender: female                 REASON FOR MISSED TREATMENT:  Patient not seen for occupational therapy this date    -    Hold OT evaluation at this time, awaiting ortho consult. Patient is scheduled for MRI due to falls and pain. LETY Cruz aware. OT will continue to follow and see patient when appropriate. [id number]    Electronically signed by ELDA Richardson on 24 at 2:15 PM EST

## 2024-01-18 NOTE — DISCHARGE INSTR - COC
Falls (last 30 days) and At Risk for Falls    Impairments/Disabilities:      Limited movement of (L) LE s/p hip & knee fxs    Nutrition Therapy:  Current Nutrition Therapy:   - Oral Diet:  Carb Control 4 carbs/meal (1800kcals/day)    Routes of Feeding: Oral  Liquids: No Restrictions  Daily Fluid Restriction: no  Last Modified Barium Swallow with Video (Video Swallowing Test): not done    Treatments at the Time of Hospital Discharge:   Respiratory Treatments: no  Oxygen Therapy:  is not on home oxygen therapy.  Ventilator:    - No ventilator support    Rehab Therapies: Physical Therapy and Occupational Therapy  Weight Bearing Status/Restrictions: No weight bearing restrictions  Other Medical Equipment (for information only, NOT a DME order):  walker  Other Treatments: Skilled Nursing assessment and monitoring. Medication education and monitoring per protocol.       RN SIGNATURE:  {Esignature:[id number]}    CASE MANAGEMENT/SOCIAL WORK SECTION    Inpatient Status Date:     Readmission Risk Assessment Score:  Readmission Risk              Risk of Unplanned Readmission:  0           Discharging to Facility/ Agency   [facility]  P: [phone]  F: [phone]    Dialysis Facility (if applicable)   Name:  Address:  Dialysis Schedule:  Phone:  Fax:    / signature: Electronically signed by Nisreen Wells RN on 1/18/24 at 1:09 PM EST    PHYSICIAN SECTION    Prognosis: {Prognosis:1651053696}    Condition at Discharge: { Patient Condition:597184002}    Rehab Potential (if transferring to Rehab): {Prognosis:0526209918}    Recommended Labs or Other Treatments After Discharge: ***    Physician Certification: I certify the above information and transfer of Komal Crawford  is necessary for the continuing treatment of the diagnosis listed and that she requires {Admit to Appropriate Level of Care:67096} for {GREATER/LESS:364305467} 30 days.     Update Admission H&P: {CHP DME Changes in  HandP:586516730}    PHYSICIAN SIGNATURE:  {Esignature:[id number]}

## 2024-01-18 NOTE — ED PROVIDER NOTES
EMERGENCY DEPARTMENT ENCOUNTER   ATTENDING ATTESTATION     Pt Name: Komal Crawford  MRN: [id number]  Birthdate 1940  Date of evaluation: 1/18/24       Komal Crawford is a 83 y.o. female who presents with Fall and Leg Injury (L leg)      MDM:   Trip and fall at home  Left hip pain radiates all the way down leg  Similar to past pain episodes  Cannot walk  Told left hip needs to be replaced  Will admit her for pain control again    Vitals:   Vitals:    01/17/24 2338 01/17/24 2350   BP: (!) 136/118    Pulse: 72    Resp: 21 25   Temp: 97.4 °F (36.3 °C)    TempSrc: Axillary    SpO2: 99%    Weight: 68 kg (150 lb)    Height: 1.499 m (4' 11\")          I personally saw and examined the patient. I have reviewed and agree with the resident's findings, including all diagnostic interpretations and treatment plan as written. I was present for the key portions of any procedures performed and the inclusive time noted for any critical care statement.    Delta Teixeira MD  Attending Emergency Physician            Delta Teixeira MD  01/18/24 0059

## 2024-01-18 NOTE — PROGRESS NOTES
Physical Therapy Cancel Note      DATE: 2024    NAME: Komal Crawford  MRN: [id number]   : 1940      Patient not seen this date for Physical Therapy due to:    Testing: Pt has L hip MRI ordered and Ortho consult. Will check back tomorrow. Time [id number]      Electronically signed by Renetta Castrejon PT on 2024 at 2:58 PM

## 2024-01-18 NOTE — ED TRIAGE NOTES
Mode of arrival (squad #, walk in, police, etc) : [facility]        Chief complaint(s): Fall, leg pain        Arrival Note (brief scenario, treatment PTA, etc).: Pt fell had a mechanical fall today and landed on her left side. Pt states her legs got weak and she fell. Pt was given 75mcg of fentanyl via EMS. Pt denies chest pain or shortness of breath at this time. Pt A&Ox4.         C= \"Have you ever felt that you should Cut down on your drinking?\"  No  A= \"Have people Annoyed you by criticizing your drinking?\"  No  G= \"Have you ever felt bad or Guilty about your drinking?\"  No  E= \"Have you ever had a drink as an Eye-opener first thing in the morning to steady your nerves or to help a hangover?\"  No      Deferred []      Reason for deferring: N/A    *If yes to two or more: probable alcohol abuse.*

## 2024-01-18 NOTE — H&P
32    Cancer Maternal Grandmother     Cancer Brother 52        bronchogenic adenocarcinoma cancer    Lung Cancer Brother     Cancer Sister         lung    Lung Cancer Sister          65    Breast Cancer Sister          57    Cancer Sister         breast       Review of Systems:     Positive and Negative as described in HPI.    CONSTITUTIONAL:  negative for fevers, chills, sweats, fatigue, weight loss  HEENT:  negative for vision, hearing changes, runny nose, throat pain  RESPIRATORY:  negative for shortness of breath, cough, congestion, wheezing.  CARDIOVASCULAR:  negative for chest pain, palpitations.  GASTROINTESTINAL:  negative for nausea, vomiting, diarrhea, constipation, change in bowel habits, abdominal pain   GENITOURINARY:  negative for difficulty of urination, burning with urination, frequency   INTEGUMENT:  negative for rash, skin lesions, easy bruising   HEMATOLOGIC/LYMPHATIC:  negative for swelling/edema   ALLERGIC/IMMUNOLOGIC:  negative for urticaria , itching  ENDOCRINE:  negative increase in drinking, increase in urination, hot or cold intolerance  MUSCULOSKELETAL:  negative joint pains, muscle aches, swelling of joints  NEUROLOGICAL:  negative for headaches, dizziness, lightheadedness, numbness, pain, tingling extremities  BEHAVIOR/PSYCH:  negative for depression, anxiety    Physical Exam:   /89   Pulse 73   Temp 97.7 °F (36.5 °C) (Oral)   Resp 18   Ht 1.499 m (4' 11\")   Wt 68 kg (150 lb)   SpO2 100%   BMI 30.30 kg/m²   Temp (24hrs), Av.6 °F (36.4 °C), Min:97.4 °F (36.3 °C), Max:97.7 °F (36.5 °C)    Recent Labs     24  0613 24  1102   POCGLU 157* 268*     No intake or output data in the 24 hours ending 24 1224    General Appearance:  alert, well appearing, and in no acute distress  Mental status: oriented to person, place, and time with normal affect  Head:  normocephalic, atraumatic.  Eye: no icterus, redness, pupils equal and reactive,  post right hip arthroplasty.  3. Moderate to severe osteoarthritis of the left hip.  4. No acute fracture or dislocation of the left femur is identified.       Assessment :      Primary Problem  Left hip pain    Active Hospital Problems    Diagnosis Date Noted    Left hip pain [M25.552] 01/18/2024       Plan:     Patient status Admit as inpatient in the  Med/Surge  Patient is 83-year-old female with multiple comorbidities including history of diabetes, hypertension, CAD s/p stent in 2022, chronic thrombocytopenia, chronic anemia presented to ER with mechanical fall.  Multiple falls recently  Recently discharged from rehab  Complaining of pain in the hip, no fracture noticed  CT showed moderate to severe left hip osteoarthritis  S/p right hip arthroplasty recently  Patient tells me that she is supposed to have left  hip replacement, and wants to know if she can get that done during this admission  Will consult Dr. Bonner  Check MRI of the left hip  Patient just saw him 2 days ago  Will check vitamin B12  and vitamin D    CAD s/p stent August 2022, on aspirin and Plavix, will hold Plavix in case she needs a surgical intervention    Hypertension, blood pressures control on amlodipine and metoprolol    Recurrent UTIs and neurogenic bladder, patient straight cath herself at home, UA ordered currently pending, on chronic suppressive therapy with nitrofurantoin, which has been resumed    Diabetes mellitus, on glipizide and insulin, continue to monitor blood sugars       PT OT    DVT  Consultations:   IP CONSULT TO INTERNAL MEDICINE  IP CONSULT TO SOCIAL WORK     Patient is admitted as inpatient status because of co-morbidities listed above, severity of signs and symptoms as outlined, requirement for current medical therapies and most importantly because of direct risk to patient if care not provided in a hospital setting.    Mya Melendez MD  1/18/2024  12:24 PM    Copy sent to Sabiha Christina MD    Please note that

## 2024-01-18 NOTE — PROGRESS NOTES
Pt spoke of recent health struggles and feeling overwhelmed. Writer provided listening presence, emotional support, and pt welcomed prayer.   01/18/24 1808   Encounter Summary   Encounter Overview/Reason  Initial Encounter   Service Provided For: Patient   Referral/Consult From: Romie   Last Encounter  01/18/24   Complexity of Encounter Moderate   Begin Time 1655   End Time  1715   Total Time Calculated 20 min   Spiritual/Emotional needs   Type Spiritual Support;Emotional Distress   Assessment/Intervention/Outcome   Assessment Anxious;Impaired resilience;Powerlessness   Intervention Active listening;Discussed illness injury and it’s impact;Explored/Affirmed feelings, thoughts, concerns;Explored Coping Skills/Resources;Prayer (assurance of)/[place];Sustaining Presence/Ministry of presence   Outcome Comfort;Engaged in conversation;Expressed feelings, needs, and concerns;Expressed Gratitude;Receptive

## 2024-01-18 NOTE — ACP (ADVANCE CARE PLANNING)
Advance Care Planning     Advance Care Planning Activator (Inpatient)  Conversation Note      Date of ACP Conversation: 1/18/2024     Conversation Conducted with: Patient with Decision Making Capacity    ACP Activator: Nisreen Wells RN    Health Care Decision Maker:     Current Designated Health Care Decision Maker:     Primary Decision Maker: Ritchie Crawford - Spouse - [phone]  Click here to complete Healthcare Decision Makers including section of the Healthcare Decision Maker Relationship (ie \"Primary\")  Today we documented Decision Maker(s) consistent with Legal Next of Kin hierarchy.    Care Preferences    Ventilation:  \"If you were in your present state of health and suddenly became very ill and were unable to breathe on your own, what would your preference be about the use of a ventilator (breathing machine) if it were available to you?\"      Would the patient desire the use of ventilator (breathing machine)?: yes    \"If your health worsens and it becomes clear that your chance of recovery is unlikely, what would your preference be about the use of a ventilator (breathing machine) if it were available to you?\"     Would the patient desire the use of ventilator (breathing machine)?: No      Resuscitation  \"CPR works best to restart the heart when there is a sudden event, like a heart attack, in someone who is otherwise healthy. Unfortunately, CPR does not typically restart the heart for people who have serious health conditions or who are very sick.\"    \"In the event your heart stopped as a result of an underlying serious health condition, would you want attempts to be made to restart your heart (answer \"yes\" for attempt to resuscitate) or would you prefer a natural death (answer \"no\" for do not attempt to resuscitate)?\" yes       [] Yes   [x] No   Educated Patient / Decision Maker regarding differences between Advance Directives and portable DNR orders.    Length of ACP Conversation in minutes:

## 2024-01-18 NOTE — CONSULTS
Inpatient consult to Orthopedic Surgery  Consult performed by: Jon Bonner MD  Consult ordered by: Mya Melendez MD        Patient: Komal Crawford  Unit/Bed: 2059/2059-01  YOB: 1940  MRN: [id number]  Acct: [id number]   Admitting Diagnosis: Left hip pain [M25.552]  Inability to ambulate due to hip [R26.2]  Osteoarthritis of left hip, unspecified osteoarthritis type [M16.12]  Admit Date:  1/17/2024  Hospital Day: 0    Subjective:    Patient is having problems with  left leg pain  HPI  Patient Seen, Chart, Labs, Radiologystudies, and Consults reviewed.    See clinic note copied into this note    Pt with subsequent fall  Admitted for intractable pain    Diagnostic imaging:    New hip x-rays ?varus impacted neck fracture     AP lateral lumbar spine status post L1-2 PLIF L5-S1 decompression and instrumented fusion status post right total hip arthroplasty end-stage degenerative arthritis left hip     Assessment and Plan:  1. Low back pain, unspecified back pain laterality, unspecified chronicity, unspecified whether sciatica present    2. Arthritis of left hip    3. Left hip pain       Acute left hip pain.     Interesting since the patient was last seen in the right lower extremity edema has largely resolved and may now be less than the left lower extremity edema     10 weeks post right hip hardware removal and right ISHAN     Left lateral hip pain that travels down her leg     MRI left hip     Follow up after imaging     HPI:  This is a 83 y.o. female who presents to the clinic today for 10 weeks post right hip hardware removal and right ISHAN, 11/6/23.      Patient complains of left left pain from her buttock all the way down her foot. She describes the pain as a tearing sensation. She is unable to bear weight on her left leg.      Patient presents via wheelchair.      Review of Systems   All other systems reviewed and are negative.        Past History:    Current Medication      Current Outpatient  01/17/24  2345   WBC 10.1   RBC 3.69*   HGB 10.0*   HCT 32.1*   MCV 86.9   RDW 16.2*        BNP: No results for input(s): \"BNP\" in the last 72 hours.  PT/INR:   Recent Labs     01/17/24  2345   PROTIME 14.3   INR 1.1       Assessment/Plan:  Principal Problem:    Left hip pain  Active Problems:    Fall    Age related osteoporosis  Resolved Problems:    * No resolved hospital problems. *    Potential femoral neck fracture vs other sacral insuffiency potentially lumbar fracture    Electronically signed by Jon Bonner MD on 1/18/2024 at 2:28 PM    Rounding Hospitalist

## 2024-01-18 NOTE — ED PROVIDER NOTES
adenoma, Type II or unspecified type diabetes mellitus without mention of complication, not stated as uncontrolled, Urinary incontinence, Wears dentures, and Wears glasses.       has a past surgical history that includes bladder suspension (2002); Hysterectomy (1969); Tonsillectomy (1978); Appendectomy (1962); Spine surgery (2010); colostomy (1986); Revision Colostomy (1986); Spine surgery (1990); Upper gastrointestinal endoscopy; Bladder surgery; Upper gastrointestinal endoscopy (05/05/2014); cardiovascular stress test (12/2014); Colon surgery; Total abdominal hysterectomy w/ bilateral salpingoophorectomy; fracture surgery (Right, 2011); fracture surgery (Left, 2001); fracture surgery (Left, 2013); Cardiac catheterization (2008); Revision total knee arthroplasty (Right, 10/27/2015); Colonoscopy (04/07/2016); Upper gastrointestinal endoscopy (04/07/2016); lumbar fusion (2017); Cystocopy (09/08/2017); Cataract removal with implant (Left, 11/07/2017); pr xcapsl ctrc rmvl insj io lens prosth w/o ecp (Left, 11/07/2017); Cataract removal with implant (Right, 11/28/2017); pr xcapsl ctrc rmvl insj io lens prosth w/o ecp (Right, 11/28/2017); Upper gastrointestinal endoscopy (N/A, 07/17/2018); joint replacement (Bilateral, 2000); hernia repair; Colonoscopy (N/A, 11/06/2019); Revision total knee arthroplasty (Left, 07/14/2020); Colonoscopy (11/06/2019); Coronary angioplasty with stent (08/04/2022); Total hip arthroplasty (Right, 11/6/2023); and hip surgery (Right, 11/6/2023).      Social History     Socioeconomic History    Marital status:      Spouse name: Marty    Number of children: Not on file    Years of education: Not on file    Highest education level: Not on file   Occupational History    Occupation: retired   Tobacco Use    Smoking status: Former     Current packs/day: 0.00     Average packs/day: 0.5 packs/day for 30.0 years (15.0 ttl pk-yrs)     Types: Cigarettes     Start date: 6/1/1952     Quit date: 6/1/1982  Mother         breast and uterine  41    Heart Disease Father     Heart Attack Father          32    Cancer Maternal Grandmother     Cancer Brother 52        bronchogenic adenocarcinoma cancer    Lung Cancer Brother     Cancer Sister         lung    Lung Cancer Sister          65    Breast Cancer Sister          57    Cancer Sister         breast       Allergies:  Iodides, Iodinated contrast media, Povidone-iodine, Sulfa antibiotics, Betadine [povidone iodine], Cephalexin, Cephalexin, Cozaar [losartan], Cymbalta [duloxetine hcl], Demerol, Doxycycline, Duloxetine, Meperidine, Morphine, Penicillins, Zithromax [azithromycin], Ciprofloxacin, Clindamycin/lincomycin, Etodolac, Fesoterodine, Fluorescein, Iodine, Lisinopril, Penicillin g, Toviaz [fesoterodine fumarate er], Clonazepam, and Metformin and related    Home Medications:  Prior to Admission medications    Medication Sig Start Date End Date Taking? Authorizing Provider   methylPREDNISolone (MEDROL DOSEPACK) 4 MG tablet Take by mouth. 24  Benja Cedillo PA   tiZANidine (ZANAFLEX) 2 MG tablet Take 1 tablet by mouth nightly as needed (for pain) 1/10/24   Benja Cedillo PA   ranolazine (RANEXA) 500 MG extended release tablet Take 1 tablet by mouth 2 times daily 24   Sakshi Johnson MD   metoprolol succinate (TOPROL XL) 25 MG extended release tablet Take 0.5 tablets by mouth daily 1/10/24   Sakshi Johnson MD   bisacodyl (DULCOLAX) 5 MG EC tablet Take 1 tablet by mouth 2 times daily as needed for Constipation 24   Sakshi oJhnson MD   polyethylene glycol (GLYCOLAX) 17 g packet Take 1 packet by mouth daily as needed for Constipation 24   Sakshi Johnson MD   insulin glargine (LANTUS SOLOSTAR) 100 UNIT/ML injection pen Inject 10 Units into the skin nightly 24   Sakshi Johnson MD   Insulin Pen Needle ([employer] PEN NEEDLES 29G) 29G X 12MM MISC 1 each by Does not apply route daily 24   Elizabeth

## 2024-01-18 NOTE — ED NOTES
Report given to LETY Matthew from [facility].   Report method by phone   The following was reviewed with receiving RN:   Current vital signs:  BP (!) 125/95   Pulse 85   Temp 97.4 °F (36.3 °C) (Axillary)   Resp 17   Ht 1.499 m (4' 11\")   Wt 68 kg (150 lb)   SpO2 97%   BMI 30.30 kg/m²                MEWS Score: 2     Any medication or safety alerts were reviewed. Any pending diagnostics and notifications were also reviewed, as well as any safety concerns or issues, abnormal labs, abnormal imaging, and abnormal assessment findings. Questions were answered.

## 2024-01-18 NOTE — PROGRESS NOTES
[facility] Internal Medicine  Skinny Anders MD; Rosendo Tena MD; Sohan Aguilar MD; MD Heydi Scott MD; Lavern Rodgers MD  [facility] Internal Medicine   IN-PATIENT SERVICE  [facility]                 Date:   1/18/2024  Patientname:  Komal Crawford  Date of admission:  1/17/2024 11:37 PM  MRN:   [id number]  Account:  [id number]  YOB: 1940  PCP:    Sabiha Bedolla MD  Room:   06/06  Code Status:             Full CODE      Chief Complaint:     Chief Complaint   Patient presents with    Fall    Leg Injury     L leg       History of Present Illness:     Komal Crawford is a 83 y.o. Non- / non  female who presents with Fall and Leg Injury (L leg)   and is admitted to the hospital for the management of Left hip pain.  Medical history significant for CAD, HTN, DM, TIA and chronic pain.  Presented to ED per EMS after mechanical fall.  States that she was attempting to  her dog when her left leg gave out.  States that she felt a snapping sensation along her left hip and leg.  Denies hitting her head or any other injury.  She does admit to chronic sciatic type pain and left hip pain.  She was unable to ambulate after incident prompting call to EMS.  X-ray imaging reveals moderate to severe osteoarthritis of left hip with no acute fracture identified.  Patient has had similar presentations in the past where she required admission and rehab placement at discharge.  Difficulty controlling pain in ED requiring IV pain medication.  Denies fever, chills, chest pain, cough, abdominal pain, nausea, vomiting, diarrhea, and urinary symptoms. Symptoms are aggravated by increased activity or movement of left leg.  Symptoms are alleviated by IV pain medications.  Symptoms are reported as acute, constant with increase during movement    Past Medical History:     Past Medical History:   Diagnosis Date    Age-related cognitive decline     Ankle pain     Left

## 2024-01-18 NOTE — CARE COORDINATION
Case Management Assessment  Initial Evaluation    Date/Time of Evaluation: 1/18/2024 10:54 AM  Assessment Completed by: Nisreen Wells RN    If patient is discharged prior to next notation, then this note serves as note for discharge by case management.    Patient Name: Komal Crawford                   YOB: 1940  Diagnosis: Left hip pain [M25.552]  Inability to ambulate due to hip [R26.2]  Osteoarthritis of left hip, unspecified osteoarthritis type [M16.12]                   Date / Time: 1/17/2024 11:37 PM    Patient Admission Status: Observation   Readmission Risk (Low < 19, Mod (19-27), High > 27): Readmission Risk Score: 18.6    Current PCP: Sabiha Bedolla MD  PCP verified by CM? Yes    Chart Reviewed: Yes      History Provided by: Patient  Patient Orientation: Alert and Oriented    Patient Cognition: Alert    Hospitalization in the last 30 days (Readmission):  Yes    If yes, Readmission Assessment in  Navigator will be completed.    Advance Directives:      Code Status: Full Code   Patient's Primary Decision Maker is: Legal Next of Kin    Primary Decision Maker: Ritchie Crawford - Spouse - [phone]    Discharge Planning:    Patient lives with: Spouse/Significant Other Type of Home: Trailer/Mobile Home  Primary Care Giver: Self  Patient Support Systems include: Spouse/Significant Other, Friends/Neighbors   Current Financial resources:    Current community resources:    Current services prior to admission: Durable Medical Equipment            Current DME: Cane, Walker, Shower Chair, Bedside Commode            Type of Home Care services:  None    ADLS  Prior functional level: Independent in ADLs/IADLs  Current functional level: Assistance with the following:, Mobility    PT AM-PAC:   /24  OT AM-PAC:   /24    Family can provide assistance at DC: Yes  Would you like Case Management to discuss the discharge plan with any other family members/significant others, and if so, who? No  Plans to Return to

## 2024-01-19 ENCOUNTER — APPOINTMENT (OUTPATIENT)
Dept: MRI IMAGING | Age: 84
DRG: 462 | End: 2024-01-19
Payer: MEDICARE

## 2024-01-19 ENCOUNTER — ANESTHESIA EVENT (OUTPATIENT)
Dept: OPERATING ROOM | Age: 84
End: 2024-01-19
Payer: MEDICARE

## 2024-01-19 PROBLEM — M25.552 HIP PAIN, ACUTE, LEFT: Status: ACTIVE | Noted: 2024-01-19

## 2024-01-19 PROBLEM — M16.12 ARTHRITIS OF LEFT HIP: Status: ACTIVE | Noted: 2024-01-19

## 2024-01-19 PROBLEM — S72.002A CLOSED DISPLACED FRACTURE OF LEFT FEMORAL NECK (HCC): Status: ACTIVE | Noted: 2024-01-19

## 2024-01-19 LAB
ANION GAP SERPL CALCULATED.3IONS-SCNC: 12 MMOL/L (ref 9–17)
BACTERIA URNS QL MICRO: ABNORMAL
BASOPHILS # BLD: 0.1 K/UL (ref 0–0.2)
BASOPHILS NFR BLD: 1 % (ref 0–2)
BILIRUB UR QL STRIP: NEGATIVE
BUN SERPL-MCNC: 31 MG/DL (ref 8–23)
CALCIUM SERPL-MCNC: 9.3 MG/DL (ref 8.6–10.4)
CHLORIDE SERPL-SCNC: 102 MMOL/L (ref 98–107)
CLARITY UR: CLEAR
CO2 SERPL-SCNC: 23 MMOL/L (ref 20–31)
COLOR UR: YELLOW
CREAT SERPL-MCNC: 0.7 MG/DL (ref 0.5–0.9)
EOSINOPHIL # BLD: 0.1 K/UL (ref 0–0.4)
EOSINOPHILS RELATIVE PERCENT: 1 % (ref 0–4)
EPI CELLS #/AREA URNS HPF: ABNORMAL /HPF
ERYTHROCYTE [DISTWIDTH] IN BLOOD BY AUTOMATED COUNT: 15.6 % (ref 11.5–14.9)
GFR SERPL CREATININE-BSD FRML MDRD: >60 ML/MIN/1.73M2
GLUCOSE BLD-MCNC: 162 MG/DL (ref 65–105)
GLUCOSE BLD-MCNC: 188 MG/DL (ref 65–105)
GLUCOSE BLD-MCNC: 227 MG/DL (ref 65–105)
GLUCOSE SERPL-MCNC: 232 MG/DL (ref 70–99)
GLUCOSE UR STRIP-MCNC: ABNORMAL MG/DL
HCT VFR BLD AUTO: 26.8 % (ref 36–46)
HGB BLD-MCNC: 8.7 G/DL (ref 12–16)
HGB UR QL STRIP.AUTO: NEGATIVE
KETONES UR STRIP-MCNC: ABNORMAL MG/DL
LEUKOCYTE ESTERASE UR QL STRIP: ABNORMAL
LYMPHOCYTES NFR BLD: 1.33 K/UL (ref 1–4.8)
LYMPHOCYTES RELATIVE PERCENT: 14 % (ref 24–44)
MCH RBC QN AUTO: 28.2 PG (ref 26–34)
MCHC RBC AUTO-ENTMCNC: 32.6 G/DL (ref 31–37)
MCV RBC AUTO: 86.6 FL (ref 80–100)
MONOCYTES NFR BLD: 0.86 K/UL (ref 0.1–1.3)
MONOCYTES NFR BLD: 9 % (ref 1–7)
MORPHOLOGY: ABNORMAL
NEUTROPHILS NFR BLD: 75 % (ref 36–66)
NEUTS SEG NFR BLD: 7.11 K/UL (ref 1.3–9.1)
NITRITE UR QL STRIP: NEGATIVE
PH UR STRIP: 5 [PH] (ref 5–8)
PLATELET # BLD AUTO: 135 K/UL (ref 150–450)
PMV BLD AUTO: 12 FL (ref 6–12)
POTASSIUM SERPL-SCNC: 4.1 MMOL/L (ref 3.7–5.3)
PROT UR STRIP-MCNC: NEGATIVE MG/DL
RBC # BLD AUTO: 3.09 M/UL (ref 4–5.2)
RBC #/AREA URNS HPF: ABNORMAL /HPF
SODIUM SERPL-SCNC: 137 MMOL/L (ref 135–144)
SP GR UR STRIP: 1.02 (ref 1–1.03)
UROBILINOGEN UR STRIP-ACNC: NORMAL EU/DL (ref 0–1)
WBC #/AREA URNS HPF: ABNORMAL /HPF
WBC OTHER # BLD: 9.5 K/UL (ref 3.5–11)
YEAST URNS QL MICRO: ABNORMAL

## 2024-01-19 PROCEDURE — 6370000000 HC RX 637 (ALT 250 FOR IP): Performed by: INTERNAL MEDICINE

## 2024-01-19 PROCEDURE — 2580000003 HC RX 258: Performed by: NURSE PRACTITIONER

## 2024-01-19 PROCEDURE — 6370000000 HC RX 637 (ALT 250 FOR IP): Performed by: NURSE PRACTITIONER

## 2024-01-19 PROCEDURE — 80048 BASIC METABOLIC PNL TOTAL CA: CPT

## 2024-01-19 PROCEDURE — 85025 COMPLETE CBC W/AUTO DIFF WBC: CPT

## 2024-01-19 PROCEDURE — G0378 HOSPITAL OBSERVATION PER HR: HCPCS

## 2024-01-19 PROCEDURE — 99232 SBSQ HOSP IP/OBS MODERATE 35: CPT | Performed by: INTERNAL MEDICINE

## 2024-01-19 PROCEDURE — 82947 ASSAY GLUCOSE BLOOD QUANT: CPT

## 2024-01-19 PROCEDURE — 36415 COLL VENOUS BLD VENIPUNCTURE: CPT

## 2024-01-19 PROCEDURE — 1200000000 HC SEMI PRIVATE

## 2024-01-19 PROCEDURE — 6360000002 HC RX W HCPCS: Performed by: NURSE PRACTITIONER

## 2024-01-19 PROCEDURE — 81001 URINALYSIS AUTO W/SCOPE: CPT

## 2024-01-19 PROCEDURE — 73721 MRI JNT OF LWR EXTRE W/O DYE: CPT

## 2024-01-19 PROCEDURE — 72148 MRI LUMBAR SPINE W/O DYE: CPT

## 2024-01-19 PROCEDURE — 96376 TX/PRO/DX INJ SAME DRUG ADON: CPT

## 2024-01-19 RX ORDER — INSULIN GLARGINE 100 [IU]/ML
14 INJECTION, SOLUTION SUBCUTANEOUS NIGHTLY
Status: DISCONTINUED | OUTPATIENT
Start: 2024-01-19 | End: 2024-01-20

## 2024-01-19 RX ADMIN — ASPIRIN 81 MG: 81 TABLET, COATED ORAL at 08:50

## 2024-01-19 RX ADMIN — PANTOPRAZOLE SODIUM 40 MG: 40 TABLET, DELAYED RELEASE ORAL at 06:06

## 2024-01-19 RX ADMIN — INSULIN LISPRO 2 UNITS: 100 INJECTION, SOLUTION INTRAVENOUS; SUBCUTANEOUS at 08:50

## 2024-01-19 RX ADMIN — RANOLAZINE 500 MG: 500 TABLET, EXTENDED RELEASE ORAL at 21:57

## 2024-01-19 RX ADMIN — RANOLAZINE 500 MG: 500 TABLET, EXTENDED RELEASE ORAL at 08:49

## 2024-01-19 RX ADMIN — OXYCODONE HYDROCHLORIDE 5 MG: 5 TABLET ORAL at 06:07

## 2024-01-19 RX ADMIN — ENOXAPARIN SODIUM 40 MG: 40 INJECTION SUBCUTANEOUS at 08:50

## 2024-01-19 RX ADMIN — SODIUM CHLORIDE, PRESERVATIVE FREE 10 ML: 5 INJECTION INTRAVENOUS at 21:58

## 2024-01-19 RX ADMIN — GLIPIZIDE 5 MG: 5 TABLET ORAL at 06:06

## 2024-01-19 RX ADMIN — ATORVASTATIN CALCIUM 10 MG: 10 TABLET, FILM COATED ORAL at 21:58

## 2024-01-19 RX ADMIN — OXYCODONE HYDROCHLORIDE 5 MG: 5 TABLET ORAL at 13:25

## 2024-01-19 RX ADMIN — NITROFURANTOIN MONOHYDRATE/MACROCRYSTALS 100 MG: 75; 25 CAPSULE ORAL at 21:58

## 2024-01-19 RX ADMIN — ROPINIROLE HYDROCHLORIDE 4 MG: 2 TABLET, FILM COATED ORAL at 21:57

## 2024-01-19 RX ADMIN — AMLODIPINE BESYLATE 2.5 MG: 2.5 TABLET ORAL at 08:49

## 2024-01-19 RX ADMIN — PRIMIDONE 50 MG: 50 TABLET ORAL at 13:23

## 2024-01-19 RX ADMIN — FENTANYL CITRATE 50 MCG: 0.05 INJECTION, SOLUTION INTRAMUSCULAR; INTRAVENOUS at 08:50

## 2024-01-19 RX ADMIN — PRIMIDONE 50 MG: 50 TABLET ORAL at 21:58

## 2024-01-19 ASSESSMENT — PAIN DESCRIPTION - LOCATION
LOCATION: HIP

## 2024-01-19 ASSESSMENT — PAIN SCALES - GENERAL
PAINLEVEL_OUTOF10: 8
PAINLEVEL_OUTOF10: 10
PAINLEVEL_OUTOF10: 2

## 2024-01-19 ASSESSMENT — PAIN DESCRIPTION - ORIENTATION: ORIENTATION: RIGHT

## 2024-01-19 NOTE — PROGRESS NOTES
Surgical Progress Note    POD:      Patient doing fairly well  Vitals:    24 0850   BP: (!) 134/98   Pulse: 80   Resp:    Temp:    SpO2:       Temp (24hrs), Av.1 °F (36.7 °C), Min:98 °F (36.7 °C), Max:98.1 °F (36.7 °C)       Pain Control unchanged  No unusual nausea    Exam: no significant change        Lungs:  No respiratory distress    Labs reviewed:  Labs:  WBC/Hgb/Hct/Plts:  9.5/8.7/26.8/135 (618)  BUN/Cr/glu/ALT/AST/amyl/lip:  31/0.7/--/--/--/--/-- (618)  PT/INR/PTT:  14.3/1.1/25.5 ( 056)  Na/K/Cl/CO2:  137/4.1/102/23 (618)    I/O last 3 completed shifts:  In: -   Out: 700 [Urine:700]    Assessment:    Patient Active Problem List   Diagnosis    TIA (transient ischemic attack)    Chronic back pain    Diabetic peripheral neuropathy (HCC)    Macular degeneration of left eye    Constipation    Thrombocytopenia (HCC)    B12 deficiency    Vitamin D deficiency    Anemia    DDD (degenerative disc disease), cervical    Age-related cognitive decline    Acquired cyst of kidney    Microscopic hematuria    Bilateral renal cysts    Essential hypertension    Gastroesophageal reflux disease without esophagitis    Mixed incontinence    Recurrent UTI    Pure hypercholesterolemia    Hiatal hernia    Diabetic gastroparesis (HCC)    Internal hemorrhoid    Tubular adenoma    Colon polyps    Urge incontinence    S/P lumbar fusion    Chronic ITP (idiopathic thrombocytopenia) (HCC)    Urinary retention    Unsteadiness    Anxiety    Arthritis    Nausea    Diarrhea    Extrarenal pelvis    Primary osteoarthritis of left knee    Type 2 diabetes mellitus with diabetic peripheral angiopathy without gangrene, without long-term current use of insulin (HCC)    Memory loss    Urethral pain    Bladder spasms    Atrophic vaginitis    Coronary artery disease involving native coronary artery of native heart without angina pectoris    Chronic fatigue    Essential tremor    Neuropathy    Coronary angioplasty status

## 2024-01-19 NOTE — PROGRESS NOTES
Physical Therapy          Physical Therapy Cancel Note      DATE: 2024    NAME: Komla Crawford  MRN: [id number]   : 1940      Patient not seen this date for Physical Therapy due to:    Other    -    Await MRI of L hip. Per Ortho note: Potential femoral neck fracture vs other sacral insuffiency potentially lumbar fracture. Physical therapy will continue to follow.         Electronically signed by Lloyd Chung PT on 2024 at 2:07 PM

## 2024-01-19 NOTE — PLAN OF CARE
Problem: Discharge Planning  Goal: Discharge to home or other facility with appropriate resources  Outcome: Progressing  Flowsheets (Taken 1/18/2024 1955)  Discharge to home or other facility with appropriate resources: Identify barriers to discharge with patient and caregiver     Problem: Pain  Goal: Verbalizes/displays adequate comfort level or baseline comfort level  Outcome: Progressing  Flowsheets (Taken 1/18/2024 2123)  Verbalizes/displays adequate comfort level or baseline comfort level:   Encourage patient to monitor pain and request assistance   Assess pain using appropriate pain scale   Administer analgesics based on type and severity of pain and evaluate response   Implement non-pharmacological measures as appropriate and evaluate response     Problem: Safety - Adult  Goal: Free from fall injury  Outcome: Progressing   Free from falls throughout shift. Pt is compliant with safety measures in place. Pt seen by care team every hour with purposefully hourly rounding, bed is in the lowest position, call light and personal belongings within reach. Two side rails are up and bed alarm is on. Pt calls out appropriately.

## 2024-01-19 NOTE — CARE COORDINATION
ONGOING DISCHARGE PLAN:    Patient is alert and oriented x4.    Spoke with patient regarding discharge plan and patient confirms that plan is still to go home with spouse and [facility] (accepted)     Pt fell at home, new orders or MRI hip and spine and consult for Dr Bonner. PT/OT eval.     Will continue to follow for additional discharge needs.    If patient is discharged prior to next notation, then this note serves as note for discharge by case management.    Electronically signed by Sandhya Flores RN on 1/19/2024 at 12:11 PM

## 2024-01-19 NOTE — PROGRESS NOTES
[facility]   IN-PATIENT SERVICE   [facility]    HISTORY AND PHYSICAL EXAMINATION            Date:   1/19/2024  Patient name:  Komal Crawford  Date of admission:  1/17/2024 11:37 PM  MRN:   [id number]  Account:  [id number]  YOB: 1940  PCP:    Sabiha Bedolla MD  Room:   2059/2059-01  Code Status:    Full Code    Chief Complaint:     Chief Complaint   Patient presents with    Fall    Leg Injury     L leg       History Obtained From:     patient    History of Present Illness:     The patient is a 83 y.o.  Non- / non  female who presents with Fall and Leg Injury (L leg)   and she is admitted to the hospital for the management of        Komal Crawford is a 83 y.o. Non- / non  female who presents with Fall and Leg Injury (L leg)   and is admitted to the hospital for the management of Left hip pain.  Medical history significant for CAD, HTN, DM, TIA and chronic pain.  Presented to ED per EMS after mechanical fall.  States that she was attempting to  her dog when her left leg gave out.  States that she felt a snapping sensation along her left hip and leg.  Denies hitting her head or any other injury.  She does admit to chronic sciatic type pain and left hip pain.  She was unable to ambulate after incident prompting call to EMS.  X-ray imaging reveals moderate to severe osteoarthritis of left hip with no acute fracture identified.  Patient has had similar presentations in the past where she required admission and rehab placement at discharge.  Difficulty controlling pain in ED requiring IV pain medication.  Denies fever, chills, chest pain, cough, abdominal pain, nausea, vomiting, diarrhea, and urinary symptoms. Symptoms are aggravated by increased activity or movement of left leg.  Symptoms are alleviated by IV pain medications.  Symptoms are reported as acute, constant with increase during movement  Past Medical History:     Past Medical History:  sacroiliac joints and  pubic symphysis are well aligned.  The surrounding soft tissues are within  normal limits.    Status post right hip arthroplasty.  Moderate to severe degenerative changes  of the left hip are noted.  No acute fracture or dislocation of the left  femur is identified.  Left knee arthroplasty is partially visualized.  There  is scattered arterial atherosclerosis.  Impression: 1. No acute displaced pelvic or hip fracture is identified.  2. Status post right hip arthroplasty.  3. Moderate to severe osteoarthritis of the left hip.  4. No acute fracture or dislocation of the left femur is identified.       Assessment :      Primary Problem  Left hip pain    Active Hospital Problems    Diagnosis Date Noted    Hip pain, acute, left [M25.552] 01/19/2024    Left hip pain [M25.552] 01/18/2024    Fall [W19.XXXA] 01/18/2024    Age related osteoporosis [M81.0] 01/18/2024       Plan:     Patient status Admit as inpatient in the  Med/Surge  Patient is 83-year-old female with multiple comorbidities including history of diabetes, hypertension, CAD s/p stent in 2022, chronic thrombocytopenia, chronic anemia presented to ER with mechanical fall.  Multiple falls recently  Recently discharged from rehab  Complaining of pain in the hip, no fracture noticed  CT showed moderate to severe left hip osteoarthritis  S/p right hip arthroplasty recently  Patient tells me that she is supposed to have left  hip replacement, and wants to know if she can get that done during this admission  Will consult Dr. Bonner  Check MRI of the left hip  Patient just saw him 2 days ago  Will check vitamin B12  and vitamin D    CAD s/p stent August 2022, on aspirin and Plavix, will hold Plavix in case she needs a surgical intervention    Hypertension, blood pressures control on amlodipine and metoprolol    Recurrent UTIs and neurogenic bladder, patient straight cath herself at home, UA ordered currently pending, on chronic suppressive therapy

## 2024-01-19 NOTE — CARE COORDINATION
On call surgery team, including Dr. Acuña with anesthesia notified of surgical case scheduled for 1/20/24 at 0830  Electronically signed by Sandhya Boyd RN on 1/19/2024 at 5:27 PM

## 2024-01-19 NOTE — PROGRESS NOTES
[facility]   OCCUPATIONAL THERAPY MISSED TREATMENT NOTE   INPATIENT   Date: 24  Patient Name: Komal Crawford       Room:   MRN: [id number]   Account #: [id number]    : 1940  (83 y.o.)  Gender: female                 REASON FOR MISSED TREATMENT:  Other    -    Await MRI of L hip. Per Ortho note: Potential femoral neck fracture vs other sacral insuffiency potentially lumbar fracture. Occupational therapy will continue to follow.   1043      Electronically signed by ELDA Smith on 24 at 10:41 AM EST

## 2024-01-19 NOTE — ANESTHESIA PRE PROCEDURE
esophagus       Social History:    Social History     Tobacco Use   • Smoking status: Former     Current packs/day: 0.00     Average packs/day: 0.5 packs/day for 30.0 years (15.0 ttl pk-yrs)     Types: Cigarettes     Start date: 1952     Quit date: 1982     Years since quittin.6   • Smokeless tobacco: Former     Quit date: 1982   Substance Use Topics   • Alcohol use: No                                Counseling given: Not Answered      Vital Signs (Current):   Vitals:    24 0714 24 0849 24 0850 24 1642   BP: (!) 143/67 (!) 134/98 (!) 134/98 (!) 144/73   Pulse: 78  80 (!) 107   Resp: 18   18   Temp: 98.1 °F (36.7 °C)   99.4 °F (37.4 °C)   TempSrc:    Oral   SpO2: 94%   97%   Weight:       Height:                                                  BP Readings from Last 3 Encounters:   24 (!) 144/73   24 133/68   24 (!) 156/70       NPO Status:                                                                                 BMI:   Wt Readings from Last 3 Encounters:   24 68 kg (150 lb)   24 71.7 kg (158 lb)   01/10/24 72 kg (158 lb 11.7 oz)     Body mass index is 30.3 kg/m².    CBC:   Lab Results   Component Value Date/Time    WBC 9.5 2024 06:18 AM    RBC 3.09 2024 06:18 AM    RBC 4.16 2023 08:59 AM    HGB 8.7 2024 06:18 AM    HCT 26.8 2024 06:18 AM    MCV 86.6 2024 06:18 AM    RDW 15.6 2024 06:18 AM     2024 06:18 AM       CMP:   Lab Results   Component Value Date/Time     2024 06:18 AM    K 4.1 2024 06:18 AM     2024 06:18 AM    CO2 23 2024 06:18 AM    BUN 31 2024 06:18 AM    CREATININE 0.7 2024 06:18 AM    GFRAA 82.1 2023 08:59 AM    AGRATIO 1.6 2021 08:38 AM    LABGLOM >60 2024 06:18 AM    GLUCOSE 232 2024 06:18 AM    GLUCOSE 168 2023 08:59 AM    PROT 6.9 2024 06:58 AM    CALCIUM 9.3 2024 06:18 AM

## 2024-01-20 ENCOUNTER — APPOINTMENT (OUTPATIENT)
Dept: GENERAL RADIOLOGY | Age: 84
DRG: 462 | End: 2024-01-20
Attending: ORTHOPAEDIC SURGERY
Payer: MEDICARE

## 2024-01-20 ENCOUNTER — ANESTHESIA (OUTPATIENT)
Dept: OPERATING ROOM | Age: 84
End: 2024-01-20
Payer: MEDICARE

## 2024-01-20 PROBLEM — M25.362 INSTABILITY OF LEFT KNEE JOINT: Status: ACTIVE | Noted: 2024-01-20

## 2024-01-20 PROBLEM — S72.001A CLOSED DISPLACED FRACTURE OF RIGHT FEMORAL NECK (HCC): Status: ACTIVE | Noted: 2024-01-20

## 2024-01-20 PROBLEM — Z96.652 HISTORY OF TOTAL KNEE ARTHROPLASTY, LEFT: Status: ACTIVE | Noted: 2024-01-20

## 2024-01-20 LAB
ANION GAP SERPL CALCULATED.3IONS-SCNC: 12 MMOL/L (ref 9–17)
BASOPHILS # BLD: 0 K/UL (ref 0–0.2)
BASOPHILS NFR BLD: 0 % (ref 0–2)
BUN SERPL-MCNC: 26 MG/DL (ref 8–23)
CALCIUM SERPL-MCNC: 8.9 MG/DL (ref 8.6–10.4)
CHLORIDE SERPL-SCNC: 101 MMOL/L (ref 98–107)
CO2 SERPL-SCNC: 24 MMOL/L (ref 20–31)
CREAT SERPL-MCNC: 0.7 MG/DL (ref 0.5–0.9)
EOSINOPHIL # BLD: 0.08 K/UL (ref 0–0.4)
EOSINOPHILS RELATIVE PERCENT: 1 % (ref 0–4)
ERYTHROCYTE [DISTWIDTH] IN BLOOD BY AUTOMATED COUNT: 15.8 % (ref 11.5–14.9)
GFR SERPL CREATININE-BSD FRML MDRD: >60 ML/MIN/1.73M2
GLUCOSE BLD-MCNC: 176 MG/DL (ref 65–105)
GLUCOSE BLD-MCNC: 196 MG/DL (ref 65–105)
GLUCOSE SERPL-MCNC: 233 MG/DL (ref 70–99)
HCT VFR BLD AUTO: 25.3 % (ref 36–46)
HGB BLD-MCNC: 8.2 G/DL (ref 12–16)
LYMPHOCYTES NFR BLD: 0.84 K/UL (ref 1–4.8)
LYMPHOCYTES RELATIVE PERCENT: 10 % (ref 24–44)
MCH RBC QN AUTO: 28.2 PG (ref 26–34)
MCHC RBC AUTO-ENTMCNC: 32.5 G/DL (ref 31–37)
MCV RBC AUTO: 86.8 FL (ref 80–100)
MONOCYTES NFR BLD: 0.67 K/UL (ref 0.1–1.3)
MONOCYTES NFR BLD: 8 % (ref 1–7)
MORPHOLOGY: ABNORMAL
NEUTROPHILS NFR BLD: 81 % (ref 36–66)
NEUTS SEG NFR BLD: 6.81 K/UL (ref 1.3–9.1)
PLATELET # BLD AUTO: 119 K/UL (ref 150–450)
PMV BLD AUTO: 12.1 FL (ref 6–12)
POTASSIUM SERPL-SCNC: 4.2 MMOL/L (ref 3.7–5.3)
RBC # BLD AUTO: 2.91 M/UL (ref 4–5.2)
SODIUM SERPL-SCNC: 137 MMOL/L (ref 135–144)
WBC OTHER # BLD: 8.4 K/UL (ref 3.5–11)

## 2024-01-20 PROCEDURE — 6370000000 HC RX 637 (ALT 250 FOR IP): Performed by: ORTHOPAEDIC SURGERY

## 2024-01-20 PROCEDURE — 7100000000 HC PACU RECOVERY - FIRST 15 MIN: Performed by: ORTHOPAEDIC SURGERY

## 2024-01-20 PROCEDURE — 2500000003 HC RX 250 WO HCPCS: Performed by: ANESTHESIOLOGY

## 2024-01-20 PROCEDURE — 73560 X-RAY EXAM OF KNEE 1 OR 2: CPT

## 2024-01-20 PROCEDURE — 2709999900 HC NON-CHARGEABLE SUPPLY: Performed by: ORTHOPAEDIC SURGERY

## 2024-01-20 PROCEDURE — 3700000001 HC ADD 15 MINUTES (ANESTHESIA): Performed by: ORTHOPAEDIC SURGERY

## 2024-01-20 PROCEDURE — 73501 X-RAY EXAM HIP UNI 1 VIEW: CPT

## 2024-01-20 PROCEDURE — 99024 POSTOP FOLLOW-UP VISIT: CPT | Performed by: ORTHOPAEDIC SURGERY

## 2024-01-20 PROCEDURE — 6360000002 HC RX W HCPCS: Performed by: ORTHOPAEDIC SURGERY

## 2024-01-20 PROCEDURE — 86901 BLOOD TYPING SEROLOGIC RH(D): CPT

## 2024-01-20 PROCEDURE — 82947 ASSAY GLUCOSE BLOOD QUANT: CPT

## 2024-01-20 PROCEDURE — 99232 SBSQ HOSP IP/OBS MODERATE 35: CPT | Performed by: INTERNAL MEDICINE

## 2024-01-20 PROCEDURE — 85025 COMPLETE CBC W/AUTO DIFF WBC: CPT

## 2024-01-20 PROCEDURE — 3700000000 HC ANESTHESIA ATTENDED CARE: Performed by: ORTHOPAEDIC SURGERY

## 2024-01-20 PROCEDURE — 86900 BLOOD TYPING SEROLOGIC ABO: CPT

## 2024-01-20 PROCEDURE — 3600000013 HC SURGERY LEVEL 3 ADDTL 15MIN: Performed by: ORTHOPAEDIC SURGERY

## 2024-01-20 PROCEDURE — 36415 COLL VENOUS BLD VENIPUNCTURE: CPT

## 2024-01-20 PROCEDURE — 7100000001 HC PACU RECOVERY - ADDTL 15 MIN: Performed by: ORTHOPAEDIC SURGERY

## 2024-01-20 PROCEDURE — 3600000003 HC SURGERY LEVEL 3 BASE: Performed by: ORTHOPAEDIC SURGERY

## 2024-01-20 PROCEDURE — 86850 RBC ANTIBODY SCREEN: CPT

## 2024-01-20 PROCEDURE — 1200000000 HC SEMI PRIVATE

## 2024-01-20 PROCEDURE — 80048 BASIC METABOLIC PNL TOTAL CA: CPT

## 2024-01-20 PROCEDURE — 86920 COMPATIBILITY TEST SPIN: CPT

## 2024-01-20 PROCEDURE — 0SRB0JA REPLACEMENT OF LEFT HIP JOINT WITH SYNTHETIC SUBSTITUTE, UNCEMENTED, OPEN APPROACH: ICD-10-PCS | Performed by: ORTHOPAEDIC SURGERY

## 2024-01-20 PROCEDURE — 2580000003 HC RX 258: Performed by: ANESTHESIOLOGY

## 2024-01-20 PROCEDURE — 6360000002 HC RX W HCPCS: Performed by: ANESTHESIOLOGY

## 2024-01-20 PROCEDURE — C1776 JOINT DEVICE (IMPLANTABLE): HCPCS | Performed by: ORTHOPAEDIC SURGERY

## 2024-01-20 PROCEDURE — 2580000003 HC RX 258: Performed by: ORTHOPAEDIC SURGERY

## 2024-01-20 DEVICE — STEM FEM SZ 15 L150MM 133DEG DST HIP PPS STD OFFSET TYP 1: Type: IMPLANTABLE DEVICE | Site: HIP | Status: FUNCTIONAL

## 2024-01-20 DEVICE — IMPLANTABLE DEVICE
Type: IMPLANTABLE DEVICE | Site: HIP | Status: FUNCTIONAL
Brand: G7® ACETABULAR SYSTEM

## 2024-01-20 DEVICE — IMPLANTABLE DEVICE: Type: IMPLANTABLE DEVICE | Site: HIP | Status: FUNCTIONAL

## 2024-01-20 DEVICE — HEAD FEM DIA36MM -3MM OFFSET HIP CO CHROM TYP 1 TAPR MOD G7: Type: IMPLANTABLE DEVICE | Site: HIP | Status: FUNCTIONAL

## 2024-01-20 DEVICE — IMPLANTABLE DEVICE
Type: IMPLANTABLE DEVICE | Site: HIP | Status: FUNCTIONAL
Brand: G7® LONGEVITY®

## 2024-01-20 RX ORDER — VANCOMYCIN HYDROCHLORIDE 1 G/20ML
INJECTION, POWDER, LYOPHILIZED, FOR SOLUTION INTRAVENOUS PRN
Status: DISCONTINUED | OUTPATIENT
Start: 2024-01-20 | End: 2024-01-20 | Stop reason: ALTCHOICE

## 2024-01-20 RX ORDER — SODIUM CHLORIDE 0.9 % (FLUSH) 0.9 %
5-40 SYRINGE (ML) INJECTION PRN
Status: DISCONTINUED | OUTPATIENT
Start: 2024-01-20 | End: 2024-01-26 | Stop reason: HOSPADM

## 2024-01-20 RX ORDER — FENTANYL CITRATE 0.05 MG/ML
25 INJECTION, SOLUTION INTRAMUSCULAR; INTRAVENOUS EVERY 5 MIN PRN
Status: DISCONTINUED | OUTPATIENT
Start: 2024-01-20 | End: 2024-01-20

## 2024-01-20 RX ORDER — TRANEXAMIC ACID 100 MG/ML
INJECTION, SOLUTION INTRAVENOUS PRN
Status: DISCONTINUED | OUTPATIENT
Start: 2024-01-20 | End: 2024-01-20 | Stop reason: SDUPTHER

## 2024-01-20 RX ORDER — LIDOCAINE HYDROCHLORIDE 10 MG/ML
INJECTION, SOLUTION EPIDURAL; INFILTRATION; INTRACAUDAL; PERINEURAL PRN
Status: DISCONTINUED | OUTPATIENT
Start: 2024-01-20 | End: 2024-01-20 | Stop reason: SDUPTHER

## 2024-01-20 RX ORDER — ONDANSETRON 2 MG/ML
4 INJECTION INTRAMUSCULAR; INTRAVENOUS
Status: DISCONTINUED | OUTPATIENT
Start: 2024-01-20 | End: 2024-01-20

## 2024-01-20 RX ORDER — ENOXAPARIN SODIUM 100 MG/ML
40 INJECTION SUBCUTANEOUS DAILY
Status: DISCONTINUED | OUTPATIENT
Start: 2024-01-22 | End: 2024-01-24

## 2024-01-20 RX ORDER — DIPHENHYDRAMINE HYDROCHLORIDE 50 MG/ML
12.5 INJECTION INTRAMUSCULAR; INTRAVENOUS
Status: DISCONTINUED | OUTPATIENT
Start: 2024-01-20 | End: 2024-01-20

## 2024-01-20 RX ORDER — SODIUM CHLORIDE 0.9 % (FLUSH) 0.9 %
5-40 SYRINGE (ML) INJECTION EVERY 12 HOURS SCHEDULED
Status: DISCONTINUED | OUTPATIENT
Start: 2024-01-20 | End: 2024-01-20

## 2024-01-20 RX ORDER — SODIUM CHLORIDE 0.9 % (FLUSH) 0.9 %
5-40 SYRINGE (ML) INJECTION EVERY 12 HOURS SCHEDULED
Status: DISCONTINUED | OUTPATIENT
Start: 2024-01-20 | End: 2024-01-26 | Stop reason: HOSPADM

## 2024-01-20 RX ORDER — SODIUM CHLORIDE, SODIUM LACTATE, POTASSIUM CHLORIDE, CALCIUM CHLORIDE 600; 310; 30; 20 MG/100ML; MG/100ML; MG/100ML; MG/100ML
INJECTION, SOLUTION INTRAVENOUS CONTINUOUS PRN
Status: DISCONTINUED | OUTPATIENT
Start: 2024-01-20 | End: 2024-01-20 | Stop reason: SDUPTHER

## 2024-01-20 RX ORDER — CEFAZOLIN SODIUM 1 G/3ML
INJECTION, POWDER, FOR SOLUTION INTRAMUSCULAR; INTRAVENOUS PRN
Status: DISCONTINUED | OUTPATIENT
Start: 2024-01-20 | End: 2024-01-20 | Stop reason: SDUPTHER

## 2024-01-20 RX ORDER — FENTANYL CITRATE 0.05 MG/ML
50 INJECTION, SOLUTION INTRAMUSCULAR; INTRAVENOUS EVERY 5 MIN PRN
Status: DISCONTINUED | OUTPATIENT
Start: 2024-01-20 | End: 2024-01-20

## 2024-01-20 RX ORDER — FENTANYL CITRATE 50 UG/ML
INJECTION, SOLUTION INTRAMUSCULAR; INTRAVENOUS PRN
Status: DISCONTINUED | OUTPATIENT
Start: 2024-01-20 | End: 2024-01-20 | Stop reason: SDUPTHER

## 2024-01-20 RX ORDER — ASPIRIN 81 MG/1
81 TABLET ORAL DAILY
Status: DISCONTINUED | OUTPATIENT
Start: 2024-01-21 | End: 2024-01-26 | Stop reason: HOSPADM

## 2024-01-20 RX ORDER — ROCURONIUM BROMIDE 10 MG/ML
INJECTION, SOLUTION INTRAVENOUS PRN
Status: DISCONTINUED | OUTPATIENT
Start: 2024-01-20 | End: 2024-01-20 | Stop reason: SDUPTHER

## 2024-01-20 RX ORDER — SODIUM CHLORIDE 0.9 % (FLUSH) 0.9 %
5-40 SYRINGE (ML) INJECTION PRN
Status: DISCONTINUED | OUTPATIENT
Start: 2024-01-20 | End: 2024-01-20

## 2024-01-20 RX ORDER — SODIUM CHLORIDE 9 MG/ML
INJECTION, SOLUTION INTRAVENOUS PRN
Status: DISCONTINUED | OUTPATIENT
Start: 2024-01-20 | End: 2024-01-26 | Stop reason: HOSPADM

## 2024-01-20 RX ORDER — ONDANSETRON 2 MG/ML
INJECTION INTRAMUSCULAR; INTRAVENOUS PRN
Status: DISCONTINUED | OUTPATIENT
Start: 2024-01-20 | End: 2024-01-20 | Stop reason: SDUPTHER

## 2024-01-20 RX ORDER — SUCCINYLCHOLINE/SOD CL,ISO/PF 200MG/10ML
SYRINGE (ML) INTRAVENOUS PRN
Status: DISCONTINUED | OUTPATIENT
Start: 2024-01-20 | End: 2024-01-20 | Stop reason: SDUPTHER

## 2024-01-20 RX ORDER — PHENYLEPHRINE HYDROCHLORIDE 10 MG/ML
INJECTION INTRAVENOUS PRN
Status: DISCONTINUED | OUTPATIENT
Start: 2024-01-20 | End: 2024-01-20 | Stop reason: SDUPTHER

## 2024-01-20 RX ORDER — SODIUM CHLORIDE 9 MG/ML
INJECTION, SOLUTION INTRAVENOUS PRN
Status: DISCONTINUED | OUTPATIENT
Start: 2024-01-20 | End: 2024-01-20

## 2024-01-20 RX ORDER — MIDAZOLAM HYDROCHLORIDE 1 MG/ML
INJECTION INTRAMUSCULAR; INTRAVENOUS PRN
Status: DISCONTINUED | OUTPATIENT
Start: 2024-01-20 | End: 2024-01-20 | Stop reason: SDUPTHER

## 2024-01-20 RX ORDER — PROPOFOL 10 MG/ML
INJECTION, EMULSION INTRAVENOUS PRN
Status: DISCONTINUED | OUTPATIENT
Start: 2024-01-20 | End: 2024-01-20 | Stop reason: SDUPTHER

## 2024-01-20 RX ORDER — SODIUM CHLORIDE 9 MG/ML
INJECTION, SOLUTION INTRAVENOUS CONTINUOUS PRN
Status: DISCONTINUED | OUTPATIENT
Start: 2024-01-20 | End: 2024-01-20 | Stop reason: SDUPTHER

## 2024-01-20 RX ORDER — INSULIN GLARGINE 100 [IU]/ML
14 INJECTION, SOLUTION SUBCUTANEOUS NIGHTLY
Status: DISCONTINUED | OUTPATIENT
Start: 2024-01-20 | End: 2024-01-26 | Stop reason: HOSPADM

## 2024-01-20 RX ORDER — CLOPIDOGREL BISULFATE 75 MG/1
75 TABLET ORAL DAILY
Status: DISCONTINUED | OUTPATIENT
Start: 2024-01-21 | End: 2024-01-26 | Stop reason: HOSPADM

## 2024-01-20 RX ADMIN — MIDAZOLAM 1 MG: 1 INJECTION INTRAMUSCULAR; INTRAVENOUS at 09:12

## 2024-01-20 RX ADMIN — SUGAMMADEX 200 MG: 100 INJECTION, SOLUTION INTRAVENOUS at 10:35

## 2024-01-20 RX ADMIN — LIDOCAINE HYDROCHLORIDE 50 MG: 10 INJECTION, SOLUTION EPIDURAL; INFILTRATION; INTRACAUDAL; PERINEURAL at 09:23

## 2024-01-20 RX ADMIN — TRANEXAMIC ACID 1000 MG: 100 INJECTION, SOLUTION INTRAVENOUS at 09:41

## 2024-01-20 RX ADMIN — FENTANYL CITRATE 25 MCG: 50 INJECTION, SOLUTION INTRAMUSCULAR; INTRAVENOUS at 10:44

## 2024-01-20 RX ADMIN — ONDANSETRON 4 MG: 2 INJECTION INTRAMUSCULAR; INTRAVENOUS at 10:24

## 2024-01-20 RX ADMIN — FENTANYL CITRATE 50 MCG: 50 INJECTION, SOLUTION INTRAMUSCULAR; INTRAVENOUS at 09:49

## 2024-01-20 RX ADMIN — Medication 2000 MG: at 18:03

## 2024-01-20 RX ADMIN — PHENYLEPHRINE HYDROCHLORIDE 100 MCG: 10 INJECTION INTRAVENOUS at 09:57

## 2024-01-20 RX ADMIN — TRANEXAMIC ACID 1000 MG: 100 INJECTION, SOLUTION INTRAVENOUS at 10:32

## 2024-01-20 RX ADMIN — ATORVASTATIN CALCIUM 10 MG: 10 TABLET, FILM COATED ORAL at 22:35

## 2024-01-20 RX ADMIN — SODIUM CHLORIDE, POTASSIUM CHLORIDE, SODIUM LACTATE AND CALCIUM CHLORIDE: 600; 310; 30; 20 INJECTION, SOLUTION INTRAVENOUS at 09:35

## 2024-01-20 RX ADMIN — ROCURONIUM BROMIDE 25 MG: 50 INJECTION, SOLUTION INTRAVENOUS at 09:34

## 2024-01-20 RX ADMIN — RANOLAZINE 500 MG: 500 TABLET, EXTENDED RELEASE ORAL at 22:35

## 2024-01-20 RX ADMIN — MIDAZOLAM 1 MG: 1 INJECTION INTRAMUSCULAR; INTRAVENOUS at 09:23

## 2024-01-20 RX ADMIN — SODIUM CHLORIDE, PRESERVATIVE FREE 10 ML: 5 INJECTION INTRAVENOUS at 22:35

## 2024-01-20 RX ADMIN — CEFAZOLIN 2 G: 1 INJECTION, POWDER, FOR SOLUTION INTRAMUSCULAR; INTRAVENOUS at 09:39

## 2024-01-20 RX ADMIN — OXYCODONE HYDROCHLORIDE 5 MG: 5 TABLET ORAL at 13:26

## 2024-01-20 RX ADMIN — NITROFURANTOIN MONOHYDRATE/MACROCRYSTALS 100 MG: 75; 25 CAPSULE ORAL at 22:34

## 2024-01-20 RX ADMIN — PROPOFOL 100 MG: 10 INJECTION, EMULSION INTRAVENOUS at 09:23

## 2024-01-20 RX ADMIN — ISOSORBIDE MONONITRATE 30 MG: 30 TABLET, EXTENDED RELEASE ORAL at 18:36

## 2024-01-20 RX ADMIN — INSULIN GLARGINE 14 UNITS: 100 INJECTION, SOLUTION SUBCUTANEOUS at 22:32

## 2024-01-20 RX ADMIN — ROCURONIUM BROMIDE 5 MG: 50 INJECTION, SOLUTION INTRAVENOUS at 09:23

## 2024-01-20 RX ADMIN — PRIMIDONE 50 MG: 50 TABLET ORAL at 22:35

## 2024-01-20 RX ADMIN — ROPINIROLE HYDROCHLORIDE 4 MG: 2 TABLET, FILM COATED ORAL at 22:35

## 2024-01-20 RX ADMIN — FENTANYL CITRATE 25 MCG: 50 INJECTION, SOLUTION INTRAMUSCULAR; INTRAVENOUS at 10:37

## 2024-01-20 RX ADMIN — Medication 100 MG: at 09:23

## 2024-01-20 RX ADMIN — METOPROLOL SUCCINATE 12.5 MG: 25 TABLET, EXTENDED RELEASE ORAL at 18:36

## 2024-01-20 RX ADMIN — PHENYLEPHRINE HYDROCHLORIDE 100 MCG: 10 INJECTION INTRAVENOUS at 10:38

## 2024-01-20 RX ADMIN — SODIUM CHLORIDE: 9 INJECTION, SOLUTION INTRAVENOUS at 09:23

## 2024-01-20 ASSESSMENT — PAIN DESCRIPTION - DESCRIPTORS: DESCRIPTORS: ACHING;HEAVINESS

## 2024-01-20 ASSESSMENT — PAIN DESCRIPTION - LOCATION: LOCATION: HIP;LEG

## 2024-01-20 ASSESSMENT — PAIN SCALES - GENERAL
PAINLEVEL_OUTOF10: 5
PAINLEVEL_OUTOF10: 10

## 2024-01-20 ASSESSMENT — PAIN - FUNCTIONAL ASSESSMENT: PAIN_FUNCTIONAL_ASSESSMENT: NONE - DENIES PAIN

## 2024-01-20 NOTE — PROGRESS NOTES
Physical Therapy        Physical Therapy Cancel Note      DATE: 2024    NAME: Komal Crawford  MRN: [id number]   : 1940      Patient not seen this date for Physical Therapy due to:    Pt scheduled for L ISHAN by Dr Bonner this morning.       Electronically signed by Lloyd Chung PT on 2024 at 9:16 AM

## 2024-01-20 NOTE — PROGRESS NOTES
[facility]   OCCUPATIONAL THERAPY MISSED TREATMENT NOTE   INPATIENT   Date: 24  Patient Name: Komal Carwford       Room:   MRN: [id number]   Account #: [id number]    : 1940  (83 y.o.)  Gender: female                 REASON FOR MISSED TREATMENT:  Patient unable to participate   -    Hold OT evaluation at this time, Patient has bedrest order placed at 1400, confirmed with LETY Nielson.   OT will continue to follow and see patient when appropriate. 1405    Electronically signed by ELDA Richardson on 24 at 2:12 PM EST

## 2024-01-20 NOTE — PROGRESS NOTES
Writer speaks to Dr Bonner regarding patient. Dr Bonner would like to cross two units in preparation but not to transfuse at this time. Writer speaks to Nara in lab who states it will take less than 5 minutes to prepare the blood in the event she needs it, but hospital policy does not allow to prepare in advance.

## 2024-01-20 NOTE — PROGRESS NOTES
[facility]  IN-PATIENT SERVICE  [facility]    PROGRESS NOTE             Date:   1/20/2024  Patient name:  Komal Crawford  Date of admission:  1/17/2024 11:37 PM  MRN:   [id number]  YOB: 1940    INTERVAL HISTORY:      SUBJECTIVE:  Post op, still recovering from anesthesia.    No other concerns.    Objective   Vitals:    01/20/24 1130 01/20/24 1140 01/20/24 1150 01/20/24 1210   BP: (!) 110/54 (!) 101/52 (!) 102/50 (!) 102/41   Pulse: 75 75 75 82   Resp: 19 17 15 16   Temp:   97.5 °F (36.4 °C) 97.6 °F (36.4 °C)   TempSrc:       SpO2: 99% 96% 96% 97%   Weight:       Height:         BP Readings from Last 6 Encounters:   01/20/24 (!) 102/41   01/09/24 133/68   01/02/24 (!) 156/70   11/24/23 120/60   11/07/23 112/78   10/23/23 128/80          Intake/Output Summary (Last 24 hours) at 1/20/2024 1427  Last data filed at 1/20/2024 1150  Gross per 24 hour   Intake 1500 ml   Output 525 ml   Net 975 ml     General appearance:  After anesthesia  HEENT: Normocephalic, no lesions, without obvious abnormality.pupils equal and reactive, EOM normal  Lungs: clear to auscultation bilaterally  Heart: regular rate and rhythm, S1, S2 normal, no murmur, click, rub or gallop  Abdomen: soft, non-tender; bowel sounds normal; no masses,  no organomegaly  Extremities: extremities normal, atraumatic, no cyanosis or edema. Pulses 2+ and symmetrical in all 4 extremities  Neurological: Somnolent, Awakens to verbal    CBC:   Recent Labs     01/17/24 2345 01/19/24  0618 01/20/24  0718   WBC 10.1 9.5 8.4   HGB 10.0* 8.7* 8.2*    135* 119*     BMP:    Recent Labs     01/17/24  2345 01/18/24  0622 01/19/24  0618 01/20/24  0718    139 137 137   K 4.0 4.0 4.1 4.2    106 102 101   CO2 21 20 23 24   BUN 34* 37* 31* 26*   CREATININE 0.9 1.0* 0.7 0.7   CALCIUM 9.5 8.9 9.3 8.9     Magnesium:No results for input(s): \"MG\" in the last 72 hours.  No results for input(s): \"AST\", \"ALT\",

## 2024-01-20 NOTE — CARE COORDINATION
DISCHARGE PLANNING NOTE:    Attempted to speak with patient earlier today, however she was off the floor for surgery.    Plan per previous d/c planner is home with [facility], however may need SNF as pt had left total hip arthroplasty d/t left femoral neck fracture.  Will need PT/OT recommendations.    Will continue to follow for additional discharge needs.    Electronically signed by Belgica Rodney RN on 1/20/2024 at 4:26 PM

## 2024-01-20 NOTE — PROGRESS NOTES
Pt s/p right tereso post op significant re swelling resolving     Subsequent admission for rehab diagnosed with left knee instability    X-ray Left knee negative with c/o on fu to clinic -     Represented with left leg pain at this time acute more centered on hip and proximal thigh with radiation to knee - right hip doing excellent    MRI hip and lumbar lumbar negative varus impacted femoral neck fracture.    Tereso today    During tereso extensive instability of knee noticed     Will repeat x-rays knees oblique x-rays on admission negative for fracture but very marginal

## 2024-01-20 NOTE — OP NOTE
[address] [address]                                OPERATIVE REPORT    PATIENT NAME: COLLIN SYLVESTER                        :        1940  MED REC NO:   [id number]                              ROOM:       205  ACCOUNT NO:   [id number]                           ADMIT DATE: 2024  PROVIDER:     Jon Bonner MD    DATE OF PROCEDURE:  2024    PREOPERATIVE DIAGNOSES:  Left femoral neck fracture varus impacted,  end-stage degenerative arthritis of left hip, and acute left leg pain  with inability walk with recent falls.    POSTOPERATIVE DIAGNOSES:  Left femoral neck fracture varus impacted,  end-stage degenerative arthritis of left hip, and acute left leg pain  with inability walk with recent falls plus left knee instability with  history of total knee arthroplasty.    OPERATION PERFORMED:  Left total hip arthroplasty.    OPERATING SURGEON:  Jon Bonner MD    ASSISTANT:  None.    ANESTHESIA:  General.    ESTIMATED BLOOD LOSS:  200 mL.    COMPLICATIONS:  None.    SPECIMENS:  None.    IMPLANTS:  [employer] 52-mm acetabulum ostial tie with a single  screw, 36-mm liner high walled size 15 Taperloc stem.    DRAINS:  None.    FINDINGS:  1.  Acceptable hip stability post total hip arthroplasty.  2.  Cross-table AP x-ray demonstrated acceptable hardware placement.  3.  The patient with severe left knee instability making surgical  procedure a little bit more difficult.    PROCEDURE IN DETAIL:  The patient was taken to the operating room,  placed under general anesthesia, and positioned in the lateral decubitus  position.  Left posterior lateral approach to the hip was made with  incision through skin, subcutaneous fat, and tensor fascia olesya.   T-capsulotomy made in line with piriformis tendon.  Short external  rotator was taken down.    At this juncture, the hip was little bit difficult to dislocate.    When I wanted  with a 2-0 Vicryl followed by skin staples.    Sterile dressing was applied.  The dressing was taken down.  The patient  was rotated back into the supine position.  Knee was checked for  instability and found to have significant varus-valgus instability,  consistent with prior complaints when she was in the Acute Rehab and  initially presented prior to these falls, at which time she less  presented with hip pain with some knee pain; although, it looked more  like hip.  Now, it is clear that she has dual problem.    The patient was awakened from anesthesia and taken to the recovery room  in stable condition.    COMPLICATIONS ARISING DURING THE OPERATION:  None noted.        KADE CUBA MD    D: 01/20/2024 11:33:55       T: 01/20/2024 11:38:09     [email or web address]  Job#: [id number]     Doc#: [id number]    CC:

## 2024-01-20 NOTE — ANESTHESIA POSTPROCEDURE EVALUATION
Department of Anesthesiology  Postprocedure Note    Patient: Komal Crawford  MRN: [id number]  YOB: 1940  Date of evaluation: 1/20/2024    Procedure Summary       Date: 01/20/24 Room / Location: [address]    Anesthesia Start: 0909 Anesthesia Stop: 1105    Procedure: HIP TOTAL ARTHROPLASTY (Left: Hip) Diagnosis: Closed displaced fracture of left femoral neck (HCC)    Surgeons: Jon Bonner MD Responsible Provider: Li Acuña MD    Anesthesia Type: general ASA Status: 3            Anesthesia Type: No value filed.    Lelo Phase I: Lelo Score: 9    Lelo Phase II:      Anesthesia Post Evaluation    Comments: POST- ANESTHESIA EVALUATION       Pt Name: Komal Crawford  MRN: [id number]  YOB: 1940  Date of evaluation: 1/20/2024  Time:  12:03 PM      BP (!) 102/50   Pulse 75   Temp 97.5 °F (36.4 °C)   Resp 15   Ht 1.499 m (4' 11\")   Wt 68 kg (150 lb)   SpO2 96%   BMI 30.30 kg/m²      Consciousness Level  Awake  Cardiopulmonary Status  Stable  Pain Adequately Treated YES  Nausea / Vomiting  NO  Adequate Hydration  YES  Anesthesia Related Complications NONE      Electronically signed by Li Acuña MD on 1/20/2024 at 12:03 PM           No notable events documented.

## 2024-01-20 NOTE — PLAN OF CARE
Problem: Discharge Planning  Goal: Discharge to home or other facility with appropriate resources  Outcome: Progressing  Flowsheets (Taken 1/20/2024 3253)  Discharge to home or other facility with appropriate resources:   Identify barriers to discharge with patient and caregiver   Arrange for needed discharge resources and transportation as appropriate   Identify discharge learning needs (meds, wound care, etc)     Problem: Pain  Goal: Verbalizes/displays adequate comfort level or baseline comfort level  Outcome: Progressing     Problem: Safety - Adult  Goal: Free from fall injury  Outcome: Progressing

## 2024-01-20 NOTE — PLAN OF CARE
Problem: Discharge Planning  Goal: Discharge to home or other facility with appropriate resources  Outcome: Progressing  Flowsheets (Taken 1/19/2024 2015)  Discharge to home or other facility with appropriate resources: Identify barriers to discharge with patient and caregiver     Problem: Pain  Goal: Verbalizes/displays adequate comfort level or baseline comfort level  Outcome: Progressing  Flowsheets (Taken 1/20/2024 0130)  Verbalizes/displays adequate comfort level or baseline comfort level:   Encourage patient to monitor pain and request assistance   Assess pain using appropriate pain scale   Administer analgesics based on type and severity of pain and evaluate response   Implement non-pharmacological measures as appropriate and evaluate response     Problem: Skin/Tissue Integrity  Goal: Absence of new skin breakdown  Description: 1.  Monitor for areas of redness and/or skin breakdown  2.  Assess vascular access sites hourly  3.  Every 4-6 hours minimum:  Change oxygen saturation probe site  4.  Every 4-6 hours:  If on nasal continuous positive airway pressure, respiratory therapy assess nares and determine need for appliance change or resting period.  Outcome: Progressing     Problem: Safety - Adult  Goal: Free from fall injury  Outcome: Progressing  Free from falls throughout shift. Pt is compliant with safety measures in place. Pt seen by care team every hour with purposefully hourly rounding, bed is in the lowest position, call light and personal belongings within reach. Two side rails are up and bed alarm is on. Pt calls out appropriately.          Problem: Chronic Conditions and Co-morbidities  Goal: Patient's chronic conditions and co-morbidity symptoms are monitored and maintained or improved  Outcome: Progressing  Flowsheets (Taken 1/19/2024 2015)  Care Plan - Patient's Chronic Conditions and Co-Morbidity Symptoms are Monitored and Maintained or Improved: Monitor and assess patient's chronic  conditions and comorbid symptoms for stability, deterioration, or improvement

## 2024-01-20 NOTE — PROGRESS NOTES
Physical Therapy          Physical Therapy Cancel Note      DATE: 2024    NAME: Komal Crawford  MRN: [id number]   : 1940      Patient not seen this date for Physical Therapy due to:    Pt on bedrest per last orders at 14:00 P:M, review chart for WB restrictions/activity orders and further  precautions for ortho for L LE prior to assessment.    Electronically signed by Lloyd Chung PT on 2024 at 2:13 PM

## 2024-01-20 NOTE — BRIEF OP NOTE
Brief Postoperative Note      Patient: Komal Crawford  YOB: 1940  MRN: [id number]    Date of Procedure: 1/20/2024    Pre-Op Diagnosis Codes:     * Closed displaced fracture of left femoral neck (HCC) [S72.002A]  Left hip arthritis    Post-Op Diagnosis: Same plus left knee instability       Procedure(s):  HIP TOTAL ARTHROPLASTY    Surgeon(s):  Jon Bonner MD    Assistant:  * No surgical staff found *    Anesthesia: General    Estimated Blood Loss (mL): 200     Complications: None    Specimens:   * No specimens in log *    Implants:  Implant Name Type Inv. Item Serial No.  Lot No. LRB No. Used Action   SHELL ACET SZ E [id number] 3 H OSSEOTI LIMIT H 2 MOBILITY G7 - [id number]  SHELL ACET SZ E [id number] 3 H OSSEOTI LIMIT H 2 MOBILITY G7  [employer]- [id number] Left 1 Implanted   BONE SCREW 6.5X30 SELF-TAP - [id number]  BONE SCREW 6.5X30 SELF-TAP  [employer]- [id number] Left 1 Implanted   LINER ACET HW E 36 MM LONGEVITY G7 - [id number]  LINER ACET HW E 36 MM LONGEVITY G7  [employer]- [id number] Left 1 Implanted   STEM FEM SZ 15 L150MM 133DEG DST HIP PPS STD OFFSET TYP 1 - [id number]  STEM FEM SZ 15 L150MM 133DEG DST HIP PPS STD OFFSET TYP 1  [employer]- [id number] Left 1 Implanted   HEAD FEM [id number] -3MM OFFSET HIP CO CHROM TYP 1 TAPR MOD G7 - [id number]  HEAD FEM [id number] -3MM OFFSET HIP CO CHROM TYP 1 TAPR MOD G7  [employer]- [id number] Left 1 Implanted         Drains: * No LDAs found *    Findings: see dictation      Electronically signed by Jon Bonner MD on 1/20/2024 at 11:07 AM

## 2024-01-21 PROBLEM — D62 ACUTE BLOOD LOSS ANEMIA: Status: ACTIVE | Noted: 2024-01-21

## 2024-01-21 PROBLEM — S72.452A DISPLACED SUPRACONDYLAR FRACTURE WITHOUT INTRACONDYLAR EXTENSION OF LOWER END OF LEFT FEMUR, INITIAL ENCOUNTER FOR CLOSED FRACTURE (HCC): Status: ACTIVE | Noted: 2024-01-21

## 2024-01-21 LAB
AMORPH SED URNS QL MICRO: ABNORMAL
ANION GAP SERPL CALCULATED.3IONS-SCNC: 12 MMOL/L (ref 9–17)
BACTERIA URNS QL MICRO: ABNORMAL
BASOPHILS # BLD: 0 K/UL (ref 0–0.2)
BASOPHILS NFR BLD: 0 % (ref 0–2)
BILIRUB UR QL STRIP: NEGATIVE
BUN SERPL-MCNC: 45 MG/DL (ref 8–23)
CALCIUM SERPL-MCNC: 8.1 MG/DL (ref 8.6–10.4)
CHLORIDE SERPL-SCNC: 99 MMOL/L (ref 98–107)
CLARITY UR: ABNORMAL
CO2 SERPL-SCNC: 22 MMOL/L (ref 20–31)
COLOR UR: ABNORMAL
CREAT SERPL-MCNC: 1.8 MG/DL (ref 0.5–0.9)
EOSINOPHIL # BLD: 0 K/UL (ref 0–0.4)
EOSINOPHILS RELATIVE PERCENT: 0 % (ref 0–4)
EPI CELLS #/AREA URNS HPF: ABNORMAL /HPF
ERYTHROCYTE [DISTWIDTH] IN BLOOD BY AUTOMATED COUNT: 15.8 % (ref 11.5–14.9)
GFR SERPL CREATININE-BSD FRML MDRD: 28 ML/MIN/1.73M2
GLUCOSE BLD-MCNC: 174 MG/DL (ref 65–105)
GLUCOSE BLD-MCNC: 256 MG/DL (ref 65–105)
GLUCOSE BLD-MCNC: 288 MG/DL (ref 65–105)
GLUCOSE SERPL-MCNC: 254 MG/DL (ref 70–99)
GLUCOSE UR STRIP-MCNC: ABNORMAL MG/DL
HCT VFR BLD AUTO: 18.3 % (ref 36–46)
HCT VFR BLD AUTO: 23.4 % (ref 36–46)
HGB BLD-MCNC: 5.8 G/DL (ref 12–16)
HGB BLD-MCNC: 7.6 G/DL (ref 12–16)
HGB UR QL STRIP.AUTO: ABNORMAL
KETONES UR STRIP-MCNC: NEGATIVE MG/DL
LEUKOCYTE ESTERASE UR QL STRIP: ABNORMAL
LYMPHOCYTES NFR BLD: 1.1 K/UL (ref 1–4.8)
LYMPHOCYTES RELATIVE PERCENT: 9 % (ref 24–44)
MAGNESIUM SERPL-MCNC: 2 MG/DL (ref 1.6–2.6)
MCH RBC QN AUTO: 27.6 PG (ref 26–34)
MCHC RBC AUTO-ENTMCNC: 31.4 G/DL (ref 31–37)
MCV RBC AUTO: 87.9 FL (ref 80–100)
MONOCYTES NFR BLD: 1 K/UL (ref 0.1–1.3)
MONOCYTES NFR BLD: 8 % (ref 1–7)
NEUTROPHILS NFR BLD: 83 % (ref 36–66)
NEUTS SEG NFR BLD: 10.7 K/UL (ref 1.3–9.1)
NITRITE UR QL STRIP: NEGATIVE
PH UR STRIP: 5 [PH] (ref 5–8)
PLATELET # BLD AUTO: 118 K/UL (ref 150–450)
PMV BLD AUTO: 13.6 FL (ref 6–12)
POTASSIUM SERPL-SCNC: 4.8 MMOL/L (ref 3.7–5.3)
PROT UR STRIP-MCNC: ABNORMAL MG/DL
RBC # BLD AUTO: 2.09 M/UL (ref 4–5.2)
RBC #/AREA URNS HPF: ABNORMAL /HPF
RETICS # AUTO: 0.07 M/UL (ref 0.02–0.1)
RETICS/RBC NFR AUTO: 3.2 % (ref 0.5–2)
SODIUM SERPL-SCNC: 133 MMOL/L (ref 135–144)
SP GR UR STRIP: 1.02 (ref 1–1.03)
UROBILINOGEN UR STRIP-ACNC: NORMAL EU/DL (ref 0–1)
WBC #/AREA URNS HPF: ABNORMAL /HPF
WBC OTHER # BLD: 12.8 K/UL (ref 3.5–11)
YEAST URNS QL MICRO: ABNORMAL

## 2024-01-21 PROCEDURE — 6370000000 HC RX 637 (ALT 250 FOR IP): Performed by: ORTHOPAEDIC SURGERY

## 2024-01-21 PROCEDURE — 99024 POSTOP FOLLOW-UP VISIT: CPT | Performed by: ORTHOPAEDIC SURGERY

## 2024-01-21 PROCEDURE — 30233N1 TRANSFUSION OF NONAUTOLOGOUS RED BLOOD CELLS INTO PERIPHERAL VEIN, PERCUTANEOUS APPROACH: ICD-10-PCS | Performed by: ORTHOPAEDIC SURGERY

## 2024-01-21 PROCEDURE — P9016 RBC LEUKOCYTES REDUCED: HCPCS

## 2024-01-21 PROCEDURE — 85014 HEMATOCRIT: CPT

## 2024-01-21 PROCEDURE — 51798 US URINE CAPACITY MEASURE: CPT

## 2024-01-21 PROCEDURE — 87086 URINE CULTURE/COLONY COUNT: CPT

## 2024-01-21 PROCEDURE — 96365 THER/PROPH/DIAG IV INF INIT: CPT

## 2024-01-21 PROCEDURE — 85018 HEMOGLOBIN: CPT

## 2024-01-21 PROCEDURE — 36415 COLL VENOUS BLD VENIPUNCTURE: CPT

## 2024-01-21 PROCEDURE — 96375 TX/PRO/DX INJ NEW DRUG ADDON: CPT

## 2024-01-21 PROCEDURE — 82947 ASSAY GLUCOSE BLOOD QUANT: CPT

## 2024-01-21 PROCEDURE — 81001 URINALYSIS AUTO W/SCOPE: CPT

## 2024-01-21 PROCEDURE — 36430 TRANSFUSION BLD/BLD COMPNT: CPT

## 2024-01-21 PROCEDURE — 99232 SBSQ HOSP IP/OBS MODERATE 35: CPT | Performed by: INTERNAL MEDICINE

## 2024-01-21 PROCEDURE — 2580000003 HC RX 258: Performed by: INTERNAL MEDICINE

## 2024-01-21 PROCEDURE — 2580000003 HC RX 258: Performed by: ORTHOPAEDIC SURGERY

## 2024-01-21 PROCEDURE — 1200000000 HC SEMI PRIVATE

## 2024-01-21 PROCEDURE — 85025 COMPLETE CBC W/AUTO DIFF WBC: CPT

## 2024-01-21 PROCEDURE — 85045 AUTOMATED RETICULOCYTE COUNT: CPT

## 2024-01-21 PROCEDURE — 83735 ASSAY OF MAGNESIUM: CPT

## 2024-01-21 PROCEDURE — 6360000002 HC RX W HCPCS: Performed by: INTERNAL MEDICINE

## 2024-01-21 PROCEDURE — 80048 BASIC METABOLIC PNL TOTAL CA: CPT

## 2024-01-21 RX ORDER — NITROFURANTOIN 25; 75 MG/1; MG/1
100 CAPSULE ORAL EVERY 12 HOURS SCHEDULED
Status: DISCONTINUED | OUTPATIENT
Start: 2024-01-21 | End: 2024-01-21

## 2024-01-21 RX ORDER — SODIUM CHLORIDE 9 MG/ML
INJECTION, SOLUTION INTRAVENOUS PRN
Status: DISCONTINUED | OUTPATIENT
Start: 2024-01-21 | End: 2024-01-26 | Stop reason: HOSPADM

## 2024-01-21 RX ORDER — HYDROCODONE BITARTRATE AND ACETAMINOPHEN 5; 325 MG/1; MG/1
2 TABLET ORAL EVERY 4 HOURS PRN
Status: DISCONTINUED | OUTPATIENT
Start: 2024-01-21 | End: 2024-01-26 | Stop reason: HOSPADM

## 2024-01-21 RX ORDER — HYDROCODONE BITARTRATE AND ACETAMINOPHEN 5; 325 MG/1; MG/1
1 TABLET ORAL EVERY 4 HOURS PRN
Status: DISCONTINUED | OUTPATIENT
Start: 2024-01-21 | End: 2024-01-26 | Stop reason: HOSPADM

## 2024-01-21 RX ORDER — FUROSEMIDE 10 MG/ML
20 INJECTION INTRAMUSCULAR; INTRAVENOUS ONCE
Status: COMPLETED | OUTPATIENT
Start: 2024-01-21 | End: 2024-01-21

## 2024-01-21 RX ADMIN — PRIMIDONE 50 MG: 50 TABLET ORAL at 21:37

## 2024-01-21 RX ADMIN — GLIPIZIDE 5 MG: 5 TABLET ORAL at 06:30

## 2024-01-21 RX ADMIN — ACETAMINOPHEN 1000 MG: 500 TABLET ORAL at 17:40

## 2024-01-21 RX ADMIN — ATORVASTATIN CALCIUM 10 MG: 10 TABLET, FILM COATED ORAL at 21:37

## 2024-01-21 RX ADMIN — ROPINIROLE HYDROCHLORIDE 4 MG: 2 TABLET, FILM COATED ORAL at 21:37

## 2024-01-21 RX ADMIN — SODIUM CHLORIDE, PRESERVATIVE FREE 10 ML: 5 INJECTION INTRAVENOUS at 21:40

## 2024-01-21 RX ADMIN — CEFTRIAXONE SODIUM 1000 MG: 1 INJECTION, POWDER, FOR SOLUTION INTRAMUSCULAR; INTRAVENOUS at 19:04

## 2024-01-21 RX ADMIN — PANTOPRAZOLE SODIUM 40 MG: 40 TABLET, DELAYED RELEASE ORAL at 06:30

## 2024-01-21 RX ADMIN — PRIMIDONE 50 MG: 50 TABLET ORAL at 09:58

## 2024-01-21 RX ADMIN — RANOLAZINE 500 MG: 500 TABLET, EXTENDED RELEASE ORAL at 09:55

## 2024-01-21 RX ADMIN — INSULIN GLARGINE 14 UNITS: 100 INJECTION, SOLUTION SUBCUTANEOUS at 21:37

## 2024-01-21 RX ADMIN — RANOLAZINE 500 MG: 500 TABLET, EXTENDED RELEASE ORAL at 21:37

## 2024-01-21 RX ADMIN — FUROSEMIDE 20 MG: 10 INJECTION, SOLUTION INTRAMUSCULAR; INTRAVENOUS at 17:04

## 2024-01-21 ASSESSMENT — PAIN DESCRIPTION - ORIENTATION
ORIENTATION: RIGHT
ORIENTATION: LEFT

## 2024-01-21 ASSESSMENT — PAIN DESCRIPTION - DESCRIPTORS
DESCRIPTORS: ACHING
DESCRIPTORS: ACHING

## 2024-01-21 ASSESSMENT — PAIN SCALES - GENERAL
PAINLEVEL_OUTOF10: 4
PAINLEVEL_OUTOF10: 2

## 2024-01-21 ASSESSMENT — PAIN DESCRIPTION - LOCATION
LOCATION: HIP
LOCATION: NECK

## 2024-01-21 NOTE — PROGRESS NOTES
01/21/24 1416   Encounter Summary   Encounter Overview/Reason  Attempted Encounter   Service Provided For: Patient not available  (patient in sterile procedure)

## 2024-01-21 NOTE — PROGRESS NOTES
[facility]  IN-PATIENT SERVICE  [facility]    PROGRESS NOTE             Date:   1/21/2024  Patient name:  Komal Crawford  Date of admission:  1/17/2024 11:37 PM  MRN:   [id number]  YOB: 1940    INTERVAL HISTORY:      SUBJECTIVE:  Post op, still recovering from anesthesia.    No other concerns.    Objective   Vitals:    01/21/24 1045 01/21/24 1119 01/21/24 1140 01/21/24 1400   BP: 117/68 (!) 102/49 108/62 (!) 104/53   Pulse: 73 68 72 71   Resp: 18 16 16 16   Temp: 97.4 °F (36.3 °C) 97.7 °F (36.5 °C) 98 °F (36.7 °C) 97.6 °F (36.4 °C)   TempSrc: Oral      SpO2: 92%  92% 93%   Weight:       Height:         BP Readings from Last 6 Encounters:   01/21/24 (!) 104/53   01/09/24 133/68   01/02/24 (!) 156/70   11/24/23 120/60   11/07/23 112/78   10/23/23 128/80          Intake/Output Summary (Last 24 hours) at 1/21/2024 1510  Last data filed at 1/21/2024 1400  Gross per 24 hour   Intake 380 ml   Output 750 ml   Net -370 ml       General appearance: alert oriented  HEENT: Normocephalic, no lesions, without obvious abnormality.pupils equal and reactive, EOM normal  Lungs: clear to auscultation bilaterally  Heart: regular rate and rhythm, S1, S2 normal, no murmur, click, rub or gallop  Abdomen: soft, non-tender; bowel sounds normal; no masses,  no organomegaly  Extremities: extremities normal, atraumatic, no cyanosis or edema. Pulses 2+ and symmetrical in all 4 extremities  Neurological:, Not in distress.  Follows commands.  Incision site is soft.  Covered with dressing which was not removed    CBC:   Recent Labs     01/19/24  0618 01/20/24  0718 01/21/24  0744   WBC 9.5 8.4 12.8*   HGB 8.7* 8.2* 5.8*   * 119* 118*       BMP:    Recent Labs     01/19/24  0618 01/20/24  0718 01/21/24  0744    137 133*   K 4.1 4.2 4.8    101 99   CO2 23 24 22   BUN 31* 26* 45*   CREATININE 0.7 0.7 1.8*   CALCIUM 9.3 8.9 8.1*       Magnesium:  Recent Labs     01/21/24  0744    MG 2.0     No results for input(s): \"AST\", \"ALT\", \"ALB\", \"TP\" in the last 72 hours.    Invalid input(s): \"ALP\", \"TBIL\", \"BILID\"  Phosphorus:No results for input(s): \"PHOS\" in the last 72 hours.  Glucose:  Recent Labs     01/19/24  0711 01/19/24  1116 01/19/24  1649 01/20/24  1106 01/20/24  1621 01/21/24  1059   POCGLU 227* 162* 188* 176* 196* 174*       Hemoglobin a1c  Hemoglobin A1C (%)   Date Value   08/03/2021 6.9 (H)   12/15/2020 6.7   06/04/2020 6.8       IMAGING  Reviewed    MICRO   [unfilled]       SCHEDULED MEDICATIONS PRN MEDICATIONS   furosemide, 20 mg, Once  clopidogrel, 75 mg, Daily  [START ON 1/22/2024] enoxaparin, 40 mg, Daily  aspirin, 81 mg, Daily  insulin glargine, 14 Units, Nightly  sodium chloride flush, 5-40 mL, 2 times per day  sodium chloride flush, 5-40 mL, 2 times per day  insulin lispro, 0-8 Units, TID WC  insulin lispro, 0-4 Units, Nightly  amLODIPine, 2.5 mg, QAM  atorvastatin, 10 mg, Nightly  pantoprazole, 40 mg, QAM AC  isosorbide mononitrate, 30 mg, QPM  metoprolol succinate, 12.5 mg, QPM  primidone, 50 mg, BID  ranolazine, 500 mg, BID  rOPINIRole, 4 mg, Nightly  glipiZIDE, 5 mg, QAM AC  nitrofurantoin (macrocrystal-monohydrate), 100 mg, Nightly     HYDROcodone 5 mg - acetaminophen, 1 tablet, Q4H PRN   Or  HYDROcodone 5 mg - acetaminophen, 2 tablet, Q4H PRN  sodium chloride, , PRN  sodium chloride flush, 5-40 mL, PRN  sodium chloride, , PRN  sodium chloride flush, 5-40 mL, PRN  sodium chloride, , PRN  potassium chloride, 40 mEq, PRN   Or  potassium alternative oral replacement, 40 mEq, PRN   Or  potassium chloride, 10 mEq, PRN  magnesium sulfate, 2,000 mg, PRN  ondansetron, 4 mg, Q8H PRN   Or  ondansetron, 4 mg, Q6H PRN  polyethylene glycol, 17 g, Daily PRN  acetaminophen, 650 mg, Q6H PRN  glucose, 4 tablet, PRN  dextrose bolus, 125 mL, PRN   Or  dextrose bolus, 250 mL, PRN  glucagon (rDNA), 1 mg, PRN  dextrose, , Continuous PRN  acetaminophen, 1,000 mg, Q6H

## 2024-01-21 NOTE — PROGRESS NOTES
Performs straight and out caths at home, currently with her hip fracture she reports is painful to spread her legs for the straight cath, reports overflow incontinence and hence has been in laying in the urine overnight.  Requests a [place], explained risk of infection, patient verbalizes understanding.

## 2024-01-21 NOTE — PLAN OF CARE
Problem: Discharge Planning  Goal: Discharge to home or other facility with appropriate resources  Outcome: Progressing  Flowsheets (Taken 1/21/2024 7231)  Discharge to home or other facility with appropriate resources:   Identify barriers to discharge with patient and caregiver   Arrange for needed discharge resources and transportation as appropriate   Identify discharge learning needs (meds, wound care, etc)     Problem: Pain  Goal: Verbalizes/displays adequate comfort level or baseline comfort level  Outcome: Progressing     Problem: Safety - Adult  Goal: Free from fall injury  Outcome: Progressing

## 2024-01-21 NOTE — PROGRESS NOTES
Writer attempted two US IV's, unsuccessful. Patient states she's had to have picc's before for limited access... Dr. Shea put order in for midline, writer verified with house supervisor to call access RN pt is symptomatic with hgb of  5.8.. she states yes... writer called nancy edwards RN who will be here in about 20 minutes she states.

## 2024-01-21 NOTE — PROGRESS NOTES
Physical Therapy          Physical Therapy Cancel Note      DATE: 2024    NAME: Komal Crawford  MRN: [id number]   : 1940      Patient not seen this date for Physical Therapy due to:    Pt continuous to have Bedrest orders.  Pt to receive 2u PRBC's today as hgb is 5.8   Per conversation with LETY Nielson, pt's RN, pt on bedrest, pt might be NPO midnight for possible Left knee surgery. Will be reviewing the chart daily.     Electronically signed by Lloyd Chung PT on 2024 at 9:00 AM

## 2024-01-21 NOTE — CARE COORDINATION
DISCHARGE PLANNING NOTE:    Pt to receive 2u PRBC's today as hgb is 5.8.    Will follow PT/OT recommendations on whether or not patient will need SNF on discharge.  Pt has otherwise been accpeted with [facility].    POD#1 left total hip arthroplasty.    Will continue to follow for additional discharge needs.    Electronically signed by Belgica Rodney RN on 1/21/2024 at 10:03 AM

## 2024-01-21 NOTE — PROGRESS NOTES
[facility]   OCCUPATIONAL THERAPY MISSED TREATMENT NOTE   INPATIENT   Date: 24  Patient Name: Komal Crawford       Room:   MRN: [id number]   Account #: [id number]    : 1940  (83 y.o.)  Gender: female                 REASON FOR MISSED TREATMENT:  Patient unable to participate   -    Pt continues to have Bedrest orders. Pt to receive 2u PRBC's today as hgb is 5.8. Per conversation with LETY Nielson, pt's RN, pt on bedrest, pt might be NPO midnight for possible Left knee surgery. OT will continue to follow.           Electronically signed by ADRIANA Richardson/L on 24 at 11:14 AM EST

## 2024-01-21 NOTE — PROGRESS NOTES
Writer speaks to Dr Bonner regarding patient. Make patient NPO at midnight for possible knee surgery tomorrow with Dr Keyes.

## 2024-01-21 NOTE — PROCEDURES
Midline placement note:   Dynamic Access RN    Prescribed IV Therapy = Blood products and fluids  Peripheral ultrasound assessment done. Plan for left brachial vein insertion.   CVR measurement = 21 % (Linear CVR is preferred to be less than 45%).  Product type: Bard 4 fr midline  History/Labs/Allergies Reviewed  Placed By: Hazel Solo - RN (Dynamic Access)  Time out Performed using Two Identifiers  Lot # [id number]  Expiration date = 03/31/2025  Trimmed at 10 cm  External catheter length 0 cm  Number of attempts 1  Special equipment used - Ultrasound and an MST insertion technique  Catheter securement = 3M securement device  Dressing applied= Tegaderm CHG  Lidocaine administered intradermally conc.1%, approx 1ml. (Lot# [id number] and Exp date= 04/30/2026)  Device should have blood return, if no blood return assess for infiltration and/or call VAT for consult.  RN aware midline placed and is immediately released for use. No cxr required due to placement of a midline.     Midline education:   [ X] Post care line insertion was discussed with patient/Family or POA prior to procedure.  Risks, benefits, alternatives, and reason for procedure were discussed and teaching was reinforced. An educational handout on post insertion line care and maintenance was left at bedside with patient or in chart. Patient (Family or POA) acknowledged understanding of information relayed.      [  ] Post care of line insertion was not discussed with patient/family or POA due to pts medical status at time of procedure. Patient's family or POA not available to discuss line education. An educational handout on post insertion line care and maintenance was left at bedside or in chart.

## 2024-01-21 NOTE — CONSENT
Informed Consent for Blood Component Transfusion Note    I have discussed with the patient the rationale for blood component transfusion; its benefits in treating or preventing fatigue, organ damage, or death; and its risk which includes mild transfusion reactions, rare risk of blood borne infection, or more serious but rare reactions. I have discussed the alternatives to transfusion, including the risk and consequences of not receiving transfusion. The patient had an opportunity to ask questions and had agreed to proceed with transfusion of blood components.    Electronically signed by Everett Shea MD on 1/21/24 at 12:59 PM EST

## 2024-01-21 NOTE — PROGRESS NOTES
Surgical Progress Note    POD: 1    Patient doing fairly well  Vitals:    24 0358   BP: 92/67   Pulse: 73   Resp: 18   Temp: 98.1 °F (36.7 °C)   SpO2: 95%      Temp (24hrs), Av.9 °F (36.6 °C), Min:97.5 °F (36.4 °C), Max:98.6 °F (37 °C)       Pain Control fair  No unusual nausea    Exam: tired        Lungs:  No respiratory distress    Labs reviewed:  Labs:  WBC/Hgb/Hct/Plts:  12.8/5.8/18.3/118 (744)  BUN/Cr/glu/ALT/AST/amyl/lip:  45/1.8/--/--/--/--/-- (744)     Na/K/Cl/CO2:  133/4.8/99/22 (744)    I/O last 3 completed shifts:  In: 1500 [I.V.:1500]  Out: 1275 [Urine:1075; Blood:200]    Assessment:    Patient Active Problem List   Diagnosis    TIA (transient ischemic attack)    Chronic back pain    Diabetic peripheral neuropathy (HCC)    Macular degeneration of left eye    Constipation    Thrombocytopenia (HCC)    B12 deficiency    Vitamin D deficiency    Anemia    DDD (degenerative disc disease), cervical    Age-related cognitive decline    Acquired cyst of kidney    Microscopic hematuria    Bilateral renal cysts    Essential hypertension    Gastroesophageal reflux disease without esophagitis    Mixed incontinence    Recurrent UTI    Pure hypercholesterolemia    Hiatal hernia    Diabetic gastroparesis (HCC)    Internal hemorrhoid    Tubular adenoma    Colon polyps    Urge incontinence    S/P lumbar fusion    Chronic ITP (idiopathic thrombocytopenia) (HCC)    Urinary retention    Unsteadiness    Anxiety    Arthritis    Nausea    Diarrhea    Extrarenal pelvis    Primary osteoarthritis of left knee    Type 2 diabetes mellitus with diabetic peripheral angiopathy without gangrene, without long-term current use of insulin (HCC)    Memory loss    Urethral pain    Bladder spasms    Atrophic vaginitis    Coronary artery disease involving native coronary artery of native heart without angina pectoris    Chronic fatigue    Essential tremor    Neuropathy    Coronary angioplasty status    Recurrent

## 2024-01-22 ENCOUNTER — ANESTHESIA EVENT (OUTPATIENT)
Dept: OPERATING ROOM | Age: 84
End: 2024-01-22
Payer: MEDICARE

## 2024-01-22 LAB
ABO/RH: NORMAL
ANION GAP SERPL CALCULATED.3IONS-SCNC: 8 MMOL/L (ref 9–17)
ANTIBODY SCREEN: NEGATIVE
ARM BAND NUMBER: NORMAL
BLOOD BANK BLOOD PRODUCT EXPIRATION DATE: NORMAL
BLOOD BANK BLOOD PRODUCT EXPIRATION DATE: NORMAL
BLOOD BANK DISPENSE STATUS: NORMAL
BLOOD BANK DISPENSE STATUS: NORMAL
BLOOD BANK ISBT PRODUCT BLOOD TYPE: 5100
BLOOD BANK ISBT PRODUCT BLOOD TYPE: 5100
BLOOD BANK PRODUCT CODE: NORMAL
BLOOD BANK PRODUCT CODE: NORMAL
BLOOD BANK SAMPLE EXPIRATION: NORMAL
BLOOD BANK UNIT TYPE AND RH: NORMAL
BLOOD BANK UNIT TYPE AND RH: NORMAL
BPU ID: NORMAL
BPU ID: NORMAL
BUN SERPL-MCNC: 40 MG/DL (ref 8–23)
CALCIUM SERPL-MCNC: 8 MG/DL (ref 8.6–10.4)
CHLORIDE SERPL-SCNC: 100 MMOL/L (ref 98–107)
CO2 SERPL-SCNC: 23 MMOL/L (ref 20–31)
COMPONENT: NORMAL
COMPONENT: NORMAL
CREAT SERPL-MCNC: 1.1 MG/DL (ref 0.5–0.9)
CROSSMATCH RESULT: NORMAL
CROSSMATCH RESULT: NORMAL
ERYTHROCYTE [DISTWIDTH] IN BLOOD BY AUTOMATED COUNT: 15.4 % (ref 11.5–14.9)
GFR SERPL CREATININE-BSD FRML MDRD: 50 ML/MIN/1.73M2
GLUCOSE BLD-MCNC: 149 MG/DL (ref 65–105)
GLUCOSE BLD-MCNC: 161 MG/DL (ref 65–105)
GLUCOSE BLD-MCNC: 237 MG/DL (ref 65–105)
GLUCOSE BLD-MCNC: 324 MG/DL (ref 65–105)
GLUCOSE SERPL-MCNC: 187 MG/DL (ref 70–99)
HCT VFR BLD AUTO: 25.3 % (ref 36–46)
HGB BLD-MCNC: 8.1 G/DL (ref 12–16)
MAGNESIUM SERPL-MCNC: 2.1 MG/DL (ref 1.6–2.6)
MCH RBC QN AUTO: 28.2 PG (ref 26–34)
MCHC RBC AUTO-ENTMCNC: 32 G/DL (ref 31–37)
MCV RBC AUTO: 88.2 FL (ref 80–100)
MICROORGANISM SPEC CULT: NORMAL
PATH REV BLD -IMP: NORMAL
PLATELET # BLD AUTO: 101 K/UL (ref 150–450)
PMV BLD AUTO: 12.6 FL (ref 6–12)
POTASSIUM SERPL-SCNC: 4 MMOL/L (ref 3.7–5.3)
RBC # BLD AUTO: 2.86 M/UL (ref 4–5.2)
SODIUM SERPL-SCNC: 129 MMOL/L (ref 135–144)
SODIUM SERPL-SCNC: 131 MMOL/L (ref 135–144)
SPECIMEN DESCRIPTION: NORMAL
SURGICAL PATHOLOGY REPORT: NORMAL
TRANSFUSION STATUS: NORMAL
TRANSFUSION STATUS: NORMAL
UNIT DIVISION: 0
UNIT DIVISION: 0
UNIT ISSUE DATE/TIME: NORMAL
UNIT ISSUE DATE/TIME: NORMAL
WBC OTHER # BLD: 11.2 K/UL (ref 3.5–11)

## 2024-01-22 PROCEDURE — 2580000003 HC RX 258: Performed by: INTERNAL MEDICINE

## 2024-01-22 PROCEDURE — 85027 COMPLETE CBC AUTOMATED: CPT

## 2024-01-22 PROCEDURE — 99233 SBSQ HOSP IP/OBS HIGH 50: CPT | Performed by: INTERNAL MEDICINE

## 2024-01-22 PROCEDURE — 83735 ASSAY OF MAGNESIUM: CPT

## 2024-01-22 PROCEDURE — 1200000000 HC SEMI PRIVATE

## 2024-01-22 PROCEDURE — 96366 THER/PROPH/DIAG IV INF ADDON: CPT

## 2024-01-22 PROCEDURE — 6360000002 HC RX W HCPCS: Performed by: ORTHOPAEDIC SURGERY

## 2024-01-22 PROCEDURE — 96376 TX/PRO/DX INJ SAME DRUG ADON: CPT

## 2024-01-22 PROCEDURE — 6370000000 HC RX 637 (ALT 250 FOR IP): Performed by: ORTHOPAEDIC SURGERY

## 2024-01-22 PROCEDURE — 2580000003 HC RX 258: Performed by: ORTHOPAEDIC SURGERY

## 2024-01-22 PROCEDURE — 6360000002 HC RX W HCPCS: Performed by: INTERNAL MEDICINE

## 2024-01-22 PROCEDURE — 80048 BASIC METABOLIC PNL TOTAL CA: CPT

## 2024-01-22 PROCEDURE — 36415 COLL VENOUS BLD VENIPUNCTURE: CPT

## 2024-01-22 PROCEDURE — 82947 ASSAY GLUCOSE BLOOD QUANT: CPT

## 2024-01-22 PROCEDURE — 84295 ASSAY OF SERUM SODIUM: CPT

## 2024-01-22 RX ADMIN — CEFTRIAXONE SODIUM 1000 MG: 1 INJECTION, POWDER, FOR SOLUTION INTRAMUSCULAR; INTRAVENOUS at 17:22

## 2024-01-22 RX ADMIN — SODIUM CHLORIDE, PRESERVATIVE FREE 10 ML: 5 INJECTION INTRAVENOUS at 09:00

## 2024-01-22 RX ADMIN — METOPROLOL SUCCINATE 12.5 MG: 25 TABLET, EXTENDED RELEASE ORAL at 17:16

## 2024-01-22 RX ADMIN — INSULIN LISPRO 2 UNITS: 100 INJECTION, SOLUTION INTRAVENOUS; SUBCUTANEOUS at 17:16

## 2024-01-22 RX ADMIN — ACETAMINOPHEN 1000 MG: 500 TABLET ORAL at 10:19

## 2024-01-22 RX ADMIN — PRIMIDONE 50 MG: 50 TABLET ORAL at 13:25

## 2024-01-22 RX ADMIN — SODIUM CHLORIDE, PRESERVATIVE FREE 10 ML: 5 INJECTION INTRAVENOUS at 22:10

## 2024-01-22 RX ADMIN — RANOLAZINE 500 MG: 500 TABLET, EXTENDED RELEASE ORAL at 13:25

## 2024-01-22 RX ADMIN — INSULIN LISPRO 4 UNITS: 100 INJECTION, SOLUTION INTRAVENOUS; SUBCUTANEOUS at 22:06

## 2024-01-22 RX ADMIN — ATORVASTATIN CALCIUM 10 MG: 10 TABLET, FILM COATED ORAL at 22:06

## 2024-01-22 RX ADMIN — INSULIN GLARGINE 14 UNITS: 100 INJECTION, SOLUTION SUBCUTANEOUS at 22:05

## 2024-01-22 RX ADMIN — PRIMIDONE 50 MG: 50 TABLET ORAL at 22:12

## 2024-01-22 RX ADMIN — RANOLAZINE 500 MG: 500 TABLET, EXTENDED RELEASE ORAL at 22:06

## 2024-01-22 RX ADMIN — FENTANYL CITRATE 50 MCG: 0.05 INJECTION, SOLUTION INTRAMUSCULAR; INTRAVENOUS at 14:27

## 2024-01-22 RX ADMIN — ROPINIROLE HYDROCHLORIDE 4 MG: 2 TABLET, FILM COATED ORAL at 22:13

## 2024-01-22 RX ADMIN — FENTANYL CITRATE 50 MCG: 0.05 INJECTION, SOLUTION INTRAMUSCULAR; INTRAVENOUS at 22:02

## 2024-01-22 ASSESSMENT — PAIN DESCRIPTION - LOCATION
LOCATION: LEG
LOCATION: ANKLE;LEG
LOCATION: LEG

## 2024-01-22 ASSESSMENT — PAIN DESCRIPTION - ORIENTATION
ORIENTATION: LEFT

## 2024-01-22 ASSESSMENT — PAIN DESCRIPTION - DESCRIPTORS
DESCRIPTORS: ACHING
DESCRIPTORS: BURNING;ACHING
DESCRIPTORS: ACHING;BURNING

## 2024-01-22 ASSESSMENT — PAIN SCALES - GENERAL
PAINLEVEL_OUTOF10: 8
PAINLEVEL_OUTOF10: 4
PAINLEVEL_OUTOF10: 8
PAINLEVEL_OUTOF10: 5
PAINLEVEL_OUTOF10: 4
PAINLEVEL_OUTOF10: 8

## 2024-01-22 NOTE — PROGRESS NOTES
[facility]   OCCUPATIONAL THERAPY MISSED TREATMENT NOTE   INPATIENT   Date: 24  Patient Name: Komal Crawford       Room:   MRN: [id number]   Account #: [id number]    : 1940  (83 y.o.)  Gender: female       REASON FOR MISSED TREATMENT:  Hold treatment per nursing request   -    Pt is on bedrest awaiting knee surgery.  Will defer OT evaluation awaiting post-op orders.  0959    Electronically signed by ELDA Arora on 24 at 9:58 AM EST

## 2024-01-22 NOTE — CARE COORDINATION
ONGOING DISCHARGE PLAN:    Patient is alert and oriented x4.    Spoke with patient regarding discharge plan and patient states that she would like to discharge to Acute Rehab here at [facility]. This writer explained that she will have to work with PT/OT and we will have to see what their recommendations are. Once they have worked with the patient we will then need a PM&R consultation.     Patient updated that if she does not qualify for rehab and if they recommend SNF we would like to send a referral. At this time the patient states she would like a referral to [facility] (sent), but would prefer rehab.     POD #2 femoral neck repair. Possible surgery today for distal left femur fracture.     Will continue to follow for additional discharge needs.    If patient is discharged prior to next notation, then this note serves as note for discharge by case management.    Electronically signed by Nisreen Wells RN on 1/22/2024 at 10:19 AM

## 2024-01-22 NOTE — PROGRESS NOTES
Pt was glad to see writer again and updated writer on medical condition and fact she will be having surgery again, this time on her knee. Pt expressed relief that her neighbors are checking on pt's . Writer provided listening presence and pt welcomed prayer.    01/22/24 1838   Encounter Summary   Encounter Overview/Reason  Spiritual/Emotional Needs   Service Provided For: Patient   Referral/Consult From: [facility] [place]   Support System Family members;Spouse   Last Encounter  01/22/24   Complexity of Encounter Moderate   Begin Time 1535   End Time  1550   Total Time Calculated 15 min   Spiritual/Emotional needs   Type Spiritual Support   Assessment/Intervention/Outcome   Assessment Calm   Intervention Active listening;Discussed illness injury and it’s impact;Explored/Affirmed feelings, thoughts, concerns;Explored Coping Skills/Resources;Prayer (assurance of)/[place];Sustaining Presence/Ministry of presence   Outcome Comfort;Engaged in conversation;Expressed feelings, needs, and concerns;Expressed Gratitude;Receptive;Optimistic

## 2024-01-22 NOTE — PROGRESS NOTES
Dr. Bonner has asked me to take care of the patient's left periprosthetic femur fracture that was discovered during her hip replacement this past weekend.    Patient will require a hinged OSS prosthesis due to the severity of her fracture and condition of her osteoporotic bone.    I in the process of making arrangements to have this equipment delivered to [facility] as this is not routinely kept here.  Tentative planning is for surgery on the afternoon of Tuesday, January 23

## 2024-01-22 NOTE — PROGRESS NOTES
No plans for knee surgery today per Dr. Keyes.  Will attempt surgery tomorrow or Wendesday depending if all surgical supplies can be obtained.

## 2024-01-22 NOTE — ANESTHESIA PRE PROCEDURE
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   • NEAL AND BSO (CERVIX REMOVED)     • TONSILLECTOMY     • TOTAL HIP ARTHROPLASTY Right 2023    HIP TOTAL ARTHROPLASTY performed by Jon Bonner MD at [facility] OR   • TOTAL HIP ARTHROPLASTY Left 2024    HIP TOTAL ARTHROPLASTY performed by Jon Bonner MD at [facility] OR   • UPPER GASTROINTESTINAL ENDOSCOPY     • UPPER GASTROINTESTINAL ENDOSCOPY  2014   • UPPER GASTROINTESTINAL ENDOSCOPY  2016    mild gastritis   • UPPER GASTROINTESTINAL ENDOSCOPY N/A 2018    retained thick secretions in proximal esophagus       Social History:    Social History     Tobacco Use   • Smoking status: Former     Current packs/day: 0.00     Average packs/day: 0.5 packs/day for 30.0 years (15.0 ttl pk-yrs)     Types: Cigarettes     Start date: 1952     Quit date: 1982     Years since quittin.6   • Smokeless tobacco: Former     Quit date: 1982   Substance Use Topics   • Alcohol use: No                                Counseling given: Not Answered      Vital Signs (Current):   There were no vitals filed for this visit.                                           BP Readings from Last 3 Encounters:   24 (!) 97/59   24 133/68   24 (!) 156/70       NPO Status:                                                                                 BMI:   Wt Readings from Last 3 Encounters:   24 68 kg (150 lb)   24 71.7 kg (158 lb)   01/10/24 72 kg (158 lb 11.7 oz)     There is no height or weight on file to calculate BMI.    CBC:   Lab Results   Component Value Date/Time    WBC 11.2 2024 06:46 AM    RBC 2.86 2024 06:46 AM    RBC 4.16 2023 08:59 AM    HGB 8.1 2024 06:46 AM    HCT 25.3 2024 06:46 AM    MCV 88.2 2024 06:46 AM    RDW 15.4 2024 06:46 AM     2024 06:46 AM       CMP:   Lab Results   Component Value Date/Time     2024 06:46 AM    K 4.0 2024 06:46 AM

## 2024-01-22 NOTE — PROGRESS NOTES
[facility]   IN-PATIENT SERVICE   [facility]                Date:   1/22/2024  Patient name:  Komal Crawford  Date of admission:  1/17/2024 11:37 PM  MRN:   [id number]  Account:  [id number]  YOB: 1940  PCP:    Sabiha Bedolla MD  Room:   2059/2059-01  Code Status:    Full Code    Chief Complaint:     Chief Complaint   Patient presents with    Fall    Leg Injury     L leg       History Obtained From:     patient    History of Present Illness:     The patient is a 83 y.o.  Non- / non  female who presents with Fall and Leg Injury (L leg)   and she is admitted to the hospital for the management of        Komal Crawford is a 83 y.o. Non- / non  female who presents with Fall and Leg Injury (L leg)   and is admitted to the hospital for the management of Left hip pain.  Medical history significant for CAD, HTN, DM, TIA and chronic pain.  Presented to ED per EMS after mechanical fall.  States that she was attempting to  her dog when her left leg gave out.  States that she felt a snapping sensation along her left hip and leg.  Denies hitting her head or any other injury.  She does admit to chronic sciatic type pain and left hip pain.  She was unable to ambulate after incident prompting call to EMS.  X-ray imaging reveals moderate to severe osteoarthritis of left hip with no acute fracture identified.  Patient has had similar presentations in the past where she required admission and rehab placement at discharge.  Difficulty controlling pain in ED requiring IV pain medication.  Denies fever, chills, chest pain, cough, abdominal pain, nausea, vomiting, diarrhea, and urinary symptoms. Symptoms are aggravated by increased activity or movement of left leg.  Symptoms are alleviated by IV pain medications.  Symptoms are reported as acute, constant with increase during movement  Past Medical History:     Past Medical History:   Diagnosis Date    Age-related

## 2024-01-22 NOTE — PROGRESS NOTES
Physical Therapy        Physical Therapy Cancel Note      DATE: 2024    NAME: Komal Crawford  MRN: [id number]   : 1940      Patient not seen this date for Physical Therapy due to:    24 Pt is on bedrest awaiting knee arthroplasty revision surgery.  Will defer PT evaluation, awaiting post-op orders.  0959       Electronically signed by Zoya Ricks PT on 2024 at 3:41 PM

## 2024-01-22 NOTE — PROGRESS NOTES
01/22/24 1229   Encounter Summary   Encounter Overview/Reason  Attempted Encounter   Service Provided For: Patient not available

## 2024-01-22 NOTE — CARE COORDINATION
DISCHARGE PLANNING NOTE:    Patient scheduled for knee arthroplasty revision on 1/23/24. Will need PT/OT following and PM&R consultation as patient is requesting inpatient rehab.     CARY Huynh, from [facility] stated they do have available beds should skilled nursing facility be recommended.     Electronically signed by Nisreen Wells RN on 1/22/2024 at 2:11 PM

## 2024-01-23 ENCOUNTER — APPOINTMENT (OUTPATIENT)
Dept: GENERAL RADIOLOGY | Age: 84
DRG: 462 | End: 2024-01-23
Payer: MEDICARE

## 2024-01-23 ENCOUNTER — ANESTHESIA (OUTPATIENT)
Dept: OPERATING ROOM | Age: 84
End: 2024-01-23
Payer: MEDICARE

## 2024-01-23 PROBLEM — M97.12XA PERIPROSTHETIC FRACTURE AROUND INTERNAL PROSTHETIC LEFT KNEE JOINT: Status: ACTIVE | Noted: 2024-01-23

## 2024-01-23 LAB
ANION GAP SERPL CALCULATED.3IONS-SCNC: 8 MMOL/L (ref 9–17)
BUN SERPL-MCNC: 29 MG/DL (ref 8–23)
CALCIUM SERPL-MCNC: 8 MG/DL (ref 8.6–10.4)
CHLORIDE SERPL-SCNC: 102 MMOL/L (ref 98–107)
CO2 SERPL-SCNC: 24 MMOL/L (ref 20–31)
CREAT SERPL-MCNC: 0.9 MG/DL (ref 0.5–0.9)
ERYTHROCYTE [DISTWIDTH] IN BLOOD BY AUTOMATED COUNT: 15.1 % (ref 11.5–14.9)
GFR SERPL CREATININE-BSD FRML MDRD: >60 ML/MIN/1.73M2
GLUCOSE BLD-MCNC: 129 MG/DL (ref 65–105)
GLUCOSE BLD-MCNC: 138 MG/DL (ref 65–105)
GLUCOSE BLD-MCNC: 185 MG/DL (ref 65–105)
GLUCOSE BLD-MCNC: 192 MG/DL (ref 65–105)
GLUCOSE SERPL-MCNC: 152 MG/DL (ref 70–99)
HCT VFR BLD AUTO: 22.9 % (ref 36–46)
HGB BLD-MCNC: 7.6 G/DL (ref 12–16)
MCH RBC QN AUTO: 28.6 PG (ref 26–34)
MCHC RBC AUTO-ENTMCNC: 32.9 G/DL (ref 31–37)
MCV RBC AUTO: 86.9 FL (ref 80–100)
PLATELET # BLD AUTO: 96 K/UL (ref 150–450)
PMV BLD AUTO: 12.6 FL (ref 6–12)
POTASSIUM SERPL-SCNC: 4.3 MMOL/L (ref 3.7–5.3)
RBC # BLD AUTO: 2.64 M/UL (ref 4–5.2)
SODIUM SERPL-SCNC: 134 MMOL/L (ref 135–144)
WBC OTHER # BLD: 9.2 K/UL (ref 3.5–11)

## 2024-01-23 PROCEDURE — 3600000004 HC SURGERY LEVEL 4 BASE: Performed by: ORTHOPAEDIC SURGERY

## 2024-01-23 PROCEDURE — 80048 BASIC METABOLIC PNL TOTAL CA: CPT

## 2024-01-23 PROCEDURE — 2580000003 HC RX 258: Performed by: ORTHOPAEDIC SURGERY

## 2024-01-23 PROCEDURE — 2500000003 HC RX 250 WO HCPCS: Performed by: NURSE ANESTHETIST, CERTIFIED REGISTERED

## 2024-01-23 PROCEDURE — 3700000001 HC ADD 15 MINUTES (ANESTHESIA): Performed by: ORTHOPAEDIC SURGERY

## 2024-01-23 PROCEDURE — 85027 COMPLETE CBC AUTOMATED: CPT

## 2024-01-23 PROCEDURE — 7100000000 HC PACU RECOVERY - FIRST 15 MIN: Performed by: ORTHOPAEDIC SURGERY

## 2024-01-23 PROCEDURE — 73560 X-RAY EXAM OF KNEE 1 OR 2: CPT

## 2024-01-23 PROCEDURE — 6360000002 HC RX W HCPCS: Performed by: ORTHOPAEDIC SURGERY

## 2024-01-23 PROCEDURE — 99232 SBSQ HOSP IP/OBS MODERATE 35: CPT | Performed by: INTERNAL MEDICINE

## 2024-01-23 PROCEDURE — 3600000014 HC SURGERY LEVEL 4 ADDTL 15MIN: Performed by: ORTHOPAEDIC SURGERY

## 2024-01-23 PROCEDURE — C1713 ANCHOR/SCREW BN/BN,TIS/BN: HCPCS | Performed by: ORTHOPAEDIC SURGERY

## 2024-01-23 PROCEDURE — 6370000000 HC RX 637 (ALT 250 FOR IP): Performed by: ORTHOPAEDIC SURGERY

## 2024-01-23 PROCEDURE — 1200000000 HC SEMI PRIVATE

## 2024-01-23 PROCEDURE — 99999 PR OFFICE/OUTPT VISIT,PROCEDURE ONLY: CPT | Performed by: ORTHOPAEDIC SURGERY

## 2024-01-23 PROCEDURE — 7100000001 HC PACU RECOVERY - ADDTL 15 MIN: Performed by: ORTHOPAEDIC SURGERY

## 2024-01-23 PROCEDURE — 82947 ASSAY GLUCOSE BLOOD QUANT: CPT

## 2024-01-23 PROCEDURE — 3700000000 HC ANESTHESIA ATTENDED CARE: Performed by: ORTHOPAEDIC SURGERY

## 2024-01-23 PROCEDURE — 2500000003 HC RX 250 WO HCPCS: Performed by: ANESTHESIOLOGY

## 2024-01-23 PROCEDURE — 2580000003 HC RX 258: Performed by: ANESTHESIOLOGY

## 2024-01-23 PROCEDURE — 36415 COLL VENOUS BLD VENIPUNCTURE: CPT

## 2024-01-23 PROCEDURE — 0SRD069 REPLACEMENT OF LEFT KNEE JOINT WITH OXIDIZED ZIRCONIUM ON POLYETHYLENE SYNTHETIC SUBSTITUTE, CEMENTED, OPEN APPROACH: ICD-10-PCS | Performed by: ORTHOPAEDIC SURGERY

## 2024-01-23 PROCEDURE — 6360000002 HC RX W HCPCS: Performed by: NURSE ANESTHETIST, CERTIFIED REGISTERED

## 2024-01-23 PROCEDURE — 2709999900 HC NON-CHARGEABLE SUPPLY: Performed by: ORTHOPAEDIC SURGERY

## 2024-01-23 PROCEDURE — C1776 JOINT DEVICE (IMPLANTABLE): HCPCS | Performed by: ORTHOPAEDIC SURGERY

## 2024-01-23 PROCEDURE — 2720000010 HC SURG SUPPLY STERILE: Performed by: ORTHOPAEDIC SURGERY

## 2024-01-23 PROCEDURE — 6360000002 HC RX W HCPCS: Performed by: ANESTHESIOLOGY

## 2024-01-23 PROCEDURE — A4216 STERILE WATER/SALINE, 10 ML: HCPCS | Performed by: ORTHOPAEDIC SURGERY

## 2024-01-23 DEVICE — IMPLANTABLE DEVICE
Type: IMPLANTABLE DEVICE | Site: KNEE | Status: FUNCTIONAL
Brand: ORTHOPEDIC SALVAGE SYSTEM (OSS™)

## 2024-01-23 DEVICE — IMPLANTABLE DEVICE
Type: IMPLANTABLE DEVICE | Site: KNEE | Status: FUNCTIONAL
Brand: ORTHOPEDIC SALVAGE SYSTEM (OSS)

## 2024-01-23 DEVICE — IMPLANTABLE DEVICE
Type: IMPLANTABLE DEVICE | Site: KNEE | Status: FUNCTIONAL
Brand: REFOBACIN® BONE CEMENT R

## 2024-01-23 RX ORDER — SODIUM CHLORIDE 0.9 % (FLUSH) 0.9 %
5-40 SYRINGE (ML) INJECTION EVERY 12 HOURS SCHEDULED
Status: DISCONTINUED | OUTPATIENT
Start: 2024-01-23 | End: 2024-01-23 | Stop reason: HOSPADM

## 2024-01-23 RX ORDER — FENTANYL CITRATE 0.05 MG/ML
25 INJECTION, SOLUTION INTRAMUSCULAR; INTRAVENOUS EVERY 5 MIN PRN
Status: DISCONTINUED | OUTPATIENT
Start: 2024-01-23 | End: 2024-01-23 | Stop reason: HOSPADM

## 2024-01-23 RX ORDER — PROPOFOL 10 MG/ML
INJECTION, EMULSION INTRAVENOUS PRN
Status: DISCONTINUED | OUTPATIENT
Start: 2024-01-23 | End: 2024-01-23 | Stop reason: SDUPTHER

## 2024-01-23 RX ORDER — OXYCODONE HYDROCHLORIDE 10 MG/1
10 TABLET ORAL EVERY 4 HOURS PRN
Status: DISCONTINUED | OUTPATIENT
Start: 2024-01-23 | End: 2024-01-25

## 2024-01-23 RX ORDER — DIPHENHYDRAMINE HYDROCHLORIDE 50 MG/ML
12.5 INJECTION INTRAMUSCULAR; INTRAVENOUS
Status: DISCONTINUED | OUTPATIENT
Start: 2024-01-23 | End: 2024-01-23 | Stop reason: HOSPADM

## 2024-01-23 RX ORDER — SODIUM CHLORIDE 9 MG/ML
INJECTION, SOLUTION INTRAVENOUS PRN
Status: DISCONTINUED | OUTPATIENT
Start: 2024-01-23 | End: 2024-01-26 | Stop reason: HOSPADM

## 2024-01-23 RX ORDER — LIDOCAINE HYDROCHLORIDE 10 MG/ML
INJECTION, SOLUTION EPIDURAL; INFILTRATION; INTRACAUDAL; PERINEURAL PRN
Status: DISCONTINUED | OUTPATIENT
Start: 2024-01-23 | End: 2024-01-23 | Stop reason: SDUPTHER

## 2024-01-23 RX ORDER — SODIUM CHLORIDE 9 MG/ML
INJECTION, SOLUTION INTRAVENOUS CONTINUOUS PRN
Status: DISCONTINUED | OUTPATIENT
Start: 2024-01-23 | End: 2024-01-23 | Stop reason: SDUPTHER

## 2024-01-23 RX ORDER — SODIUM CHLORIDE, SODIUM LACTATE, POTASSIUM CHLORIDE, CALCIUM CHLORIDE 600; 310; 30; 20 MG/100ML; MG/100ML; MG/100ML; MG/100ML
INJECTION, SOLUTION INTRAVENOUS CONTINUOUS
Status: DISCONTINUED | OUTPATIENT
Start: 2024-01-23 | End: 2024-01-26 | Stop reason: HOSPADM

## 2024-01-23 RX ORDER — ONDANSETRON 2 MG/ML
INJECTION INTRAMUSCULAR; INTRAVENOUS PRN
Status: DISCONTINUED | OUTPATIENT
Start: 2024-01-23 | End: 2024-01-23 | Stop reason: SDUPTHER

## 2024-01-23 RX ORDER — ROCURONIUM BROMIDE 10 MG/ML
INJECTION, SOLUTION INTRAVENOUS PRN
Status: DISCONTINUED | OUTPATIENT
Start: 2024-01-23 | End: 2024-01-23 | Stop reason: SDUPTHER

## 2024-01-23 RX ORDER — ACETAMINOPHEN 325 MG/1
650 TABLET ORAL EVERY 6 HOURS
Status: DISCONTINUED | OUTPATIENT
Start: 2024-01-23 | End: 2024-01-26 | Stop reason: HOSPADM

## 2024-01-23 RX ORDER — SODIUM CHLORIDE 0.9 % (FLUSH) 0.9 %
5-40 SYRINGE (ML) INJECTION PRN
Status: DISCONTINUED | OUTPATIENT
Start: 2024-01-23 | End: 2024-01-23 | Stop reason: HOSPADM

## 2024-01-23 RX ORDER — ONDANSETRON 2 MG/ML
4 INJECTION INTRAMUSCULAR; INTRAVENOUS
Status: DISCONTINUED | OUTPATIENT
Start: 2024-01-23 | End: 2024-01-23 | Stop reason: HOSPADM

## 2024-01-23 RX ORDER — SODIUM CHLORIDE 0.9 % (FLUSH) 0.9 %
5-40 SYRINGE (ML) INJECTION PRN
Status: DISCONTINUED | OUTPATIENT
Start: 2024-01-23 | End: 2024-01-26 | Stop reason: HOSPADM

## 2024-01-23 RX ORDER — SODIUM CHLORIDE 9 MG/ML
INJECTION, SOLUTION INTRAVENOUS PRN
Status: DISCONTINUED | OUTPATIENT
Start: 2024-01-23 | End: 2024-01-23 | Stop reason: HOSPADM

## 2024-01-23 RX ORDER — SODIUM CHLORIDE 0.9 % (FLUSH) 0.9 %
5-40 SYRINGE (ML) INJECTION EVERY 12 HOURS SCHEDULED
Status: DISCONTINUED | OUTPATIENT
Start: 2024-01-23 | End: 2024-01-26 | Stop reason: HOSPADM

## 2024-01-23 RX ORDER — TRANEXAMIC ACID 100 MG/ML
INJECTION, SOLUTION INTRAVENOUS PRN
Status: DISCONTINUED | OUTPATIENT
Start: 2024-01-23 | End: 2024-01-23 | Stop reason: SDUPTHER

## 2024-01-23 RX ORDER — EPHEDRINE SULFATE/0.9% NACL/PF 50 MG/5 ML
SYRINGE (ML) INTRAVENOUS PRN
Status: DISCONTINUED | OUTPATIENT
Start: 2024-01-23 | End: 2024-01-23 | Stop reason: SDUPTHER

## 2024-01-23 RX ORDER — OXYCODONE HYDROCHLORIDE 5 MG/1
5 TABLET ORAL EVERY 4 HOURS PRN
Status: DISCONTINUED | OUTPATIENT
Start: 2024-01-23 | End: 2024-01-25

## 2024-01-23 RX ORDER — FENTANYL CITRATE 0.05 MG/ML
50 INJECTION, SOLUTION INTRAMUSCULAR; INTRAVENOUS EVERY 5 MIN PRN
Status: DISCONTINUED | OUTPATIENT
Start: 2024-01-23 | End: 2024-01-23 | Stop reason: HOSPADM

## 2024-01-23 RX ORDER — DEXAMETHASONE SODIUM PHOSPHATE 4 MG/ML
INJECTION, SOLUTION INTRA-ARTICULAR; INTRALESIONAL; INTRAMUSCULAR; INTRAVENOUS; SOFT TISSUE PRN
Status: DISCONTINUED | OUTPATIENT
Start: 2024-01-23 | End: 2024-01-23 | Stop reason: SDUPTHER

## 2024-01-23 RX ORDER — ASPIRIN 81 MG/1
81 TABLET ORAL 2 TIMES DAILY
Status: DISCONTINUED | OUTPATIENT
Start: 2024-01-23 | End: 2024-01-24

## 2024-01-23 RX ORDER — FENTANYL CITRATE 50 UG/ML
INJECTION, SOLUTION INTRAMUSCULAR; INTRAVENOUS PRN
Status: DISCONTINUED | OUTPATIENT
Start: 2024-01-23 | End: 2024-01-23 | Stop reason: SDUPTHER

## 2024-01-23 RX ADMIN — FENTANYL CITRATE 25 MCG: 50 INJECTION, SOLUTION INTRAMUSCULAR; INTRAVENOUS at 16:47

## 2024-01-23 RX ADMIN — FENTANYL CITRATE 25 MCG: 50 INJECTION, SOLUTION INTRAMUSCULAR; INTRAVENOUS at 15:35

## 2024-01-23 RX ADMIN — FENTANYL CITRATE 25 MCG: 50 INJECTION, SOLUTION INTRAMUSCULAR; INTRAVENOUS at 15:10

## 2024-01-23 RX ADMIN — LIDOCAINE HYDROCHLORIDE 50 MG: 10 INJECTION, SOLUTION EPIDURAL; INFILTRATION; INTRACAUDAL; PERINEURAL at 15:10

## 2024-01-23 RX ADMIN — FENTANYL CITRATE 25 MCG: 50 INJECTION, SOLUTION INTRAMUSCULAR; INTRAVENOUS at 17:20

## 2024-01-23 RX ADMIN — Medication 10 MG: at 16:33

## 2024-01-23 RX ADMIN — PROPOFOL 30 MG: 10 INJECTION, EMULSION INTRAVENOUS at 17:02

## 2024-01-23 RX ADMIN — Medication 5 MG: at 16:46

## 2024-01-23 RX ADMIN — SODIUM CHLORIDE: 9 INJECTION, SOLUTION INTRAVENOUS at 15:05

## 2024-01-23 RX ADMIN — ASPIRIN 81 MG: 81 TABLET, COATED ORAL at 21:10

## 2024-01-23 RX ADMIN — Medication 5 MG: at 16:48

## 2024-01-23 RX ADMIN — SUGAMMADEX 200 MG: 100 INJECTION, SOLUTION INTRAVENOUS at 16:57

## 2024-01-23 RX ADMIN — PROPOFOL 120 MG: 10 INJECTION, EMULSION INTRAVENOUS at 15:10

## 2024-01-23 RX ADMIN — RANOLAZINE 500 MG: 500 TABLET, EXTENDED RELEASE ORAL at 08:17

## 2024-01-23 RX ADMIN — ROCURONIUM BROMIDE 50 MG: 10 INJECTION, SOLUTION INTRAVENOUS at 15:10

## 2024-01-23 RX ADMIN — DEXAMETHASONE SODIUM PHOSPHATE 4 MG: 4 INJECTION INTRA-ARTICULAR; INTRALESIONAL; INTRAMUSCULAR; INTRAVENOUS; SOFT TISSUE at 15:34

## 2024-01-23 RX ADMIN — ACETAMINOPHEN 650 MG: 325 TABLET, FILM COATED ORAL at 21:10

## 2024-01-23 RX ADMIN — VANCOMYCIN HYDROCHLORIDE 1000 MG: 1 INJECTION, POWDER, LYOPHILIZED, FOR SOLUTION INTRAVENOUS at 13:53

## 2024-01-23 RX ADMIN — PRIMIDONE 50 MG: 50 TABLET ORAL at 08:17

## 2024-01-23 RX ADMIN — ONDANSETRON 4 MG: 2 INJECTION INTRAMUSCULAR; INTRAVENOUS at 16:37

## 2024-01-23 RX ADMIN — Medication 10 MG: at 17:03

## 2024-01-23 RX ADMIN — SODIUM CHLORIDE, POTASSIUM CHLORIDE, SODIUM LACTATE AND CALCIUM CHLORIDE: 600; 310; 30; 20 INJECTION, SOLUTION INTRAVENOUS at 18:30

## 2024-01-23 RX ADMIN — TRANEXAMIC ACID 1000 MG: 100 INJECTION, SOLUTION INTRAVENOUS at 15:28

## 2024-01-23 ASSESSMENT — PAIN SCALES - GENERAL: PAINLEVEL_OUTOF10: 1

## 2024-01-23 ASSESSMENT — PAIN - FUNCTIONAL ASSESSMENT
PAIN_FUNCTIONAL_ASSESSMENT: 0-10
PAIN_FUNCTIONAL_ASSESSMENT: NONE - DENIES PAIN

## 2024-01-23 NOTE — PROGRESS NOTES
Physical Therapy        Physical Therapy Cancel Note      DATE: 2024    NAME: Komal Crawford  MRN: [id number]   : 1940      Patient not seen this date for Physical Therapy due to:    Pt is currently in surgery- will see pt post-op on 24       Electronically signed by Cassie Amos PT on 2024 at 2:29 PM

## 2024-01-23 NOTE — PROGRESS NOTES
VIEWS)  Narrative: EXAMINATION:  TWO XRAY VIEWS OF THE LEFT KNEE    1/20/2024 11:35 am    COMPARISON:  7 January 2024    HISTORY:  ORDERING SYSTEM PROVIDED HISTORY: knee instability  TECHNOLOGIST PROVIDED HISTORY:  knee instability    FINDINGS:  Osteopenia.  Status post total knee arthroplasty.  A comminuted impacted  fracture of the supra condylar area of the distal femur is noted with dorsal  offset of the major distal fracture fragment by 10 mm.  Soft tissue swelling  is noted.  Tibial component is intact.  Impression: Comminuted impacted fracture of the distal femur with dorsal offset of the  major distal fracture fragment by 10 mm.    RECOMMENDATION:  The findings were sent to the Radiology [facility] at 12:37  pm on 1/20/2024 to be communicated to a licensed caregiver.  XR HIP LEFT (1 VIEW)  Narrative: EXAMINATION:  ONE XRAY VIEW OF THE LEFT HIP    1/20/2024 10:21 am    COMPARISON:  None.    HISTORY:  ORDERING SYSTEM PROVIDED HISTORY: total hip in OR  TECHNOLOGIST PROVIDED HISTORY:  total hip in OR  Reason for Exam: total hip in OR    FINDINGS:  Single AP view obtained in the operating room.  Noncemented left total hip  prosthesis in place.  Satisfactory alignment.  Single screw suggest  acetabular cup.  No acute infarction.  Impression: Noncemented left total hip prosthesis in place.       Assessment :      Primary Problem  Left hip pain    Active Hospital Problems    Diagnosis Date Noted    Displaced supracondylar fracture without intracondylar extension of lower end of left femur, initial encounter for closed fracture (HCC) [S72.452A] 01/21/2024    Acute blood loss anemia [D62] 01/21/2024    History of total knee arthroplasty, left [Z96.652] 01/20/2024    Instability of left knee joint [M25.362] 01/20/2024    Closed displaced fracture of right femoral neck (HCC) [S72.001A] 01/20/2024    Hip pain, acute, left [M25.552] 01/19/2024    Arthritis of left hip [M16.12] 01/19/2024    Closed  displaced fracture of left femoral neck (HCC) [S72.002A] 01/19/2024    Left hip pain [M25.552] 01/18/2024    Fall [W19.XXXA] 01/18/2024    Age related osteoporosis [M81.0] 01/18/2024       Plan:     Patient status Admit as inpatient in the  Med/Surge  Patient is 83-year-old female with multiple comorbidities including history of diabetes, hypertension, CAD s/p stent in 2022, chronic thrombocytopenia, chronic anemia presented to ER with mechanical fall.  Multiple falls recently  Recently discharged from rehab  Complaining of pain in the hip, no fracture noticed  CT showed moderate to severe left hip osteoarthritis  S/p right hip arthroplasty recently  Patient tells me that she is supposed to have left  hip replacement, and wants to know if she can get that done during this admission  Will consult Dr. Bonner  Check MRI of the left hip  Patient just saw him 2 days ago  Will check vitamin B12  and vitamin D    CAD s/p stent August 2022, on aspirin and Plavix, will hold Plavix in case she needs a surgical intervention    Hypertension, blood pressures control on amlodipine and metoprolol    Recurrent UTIs and neurogenic bladder, patient straight cath herself at home, UA ordered currently pending, on chronic suppressive therapy with nitrofurantoin, which has been resumed    Diabetes mellitus, on glipizide and insulin, continue to monitor blood sugars      1/19  83-year-old female recently discharged from ARU, presenting with fall left hip pain chronic sciatica, x-ray shows no fracture severe osteoarthritis left hip  History of T2DM HTN CAD status post stent 2022, chronic thrombocytopenia chronic anemia, recurrent UTI neurogenic bladder straight cath at home  Orthopedics consulted due for left hip MRI and lumbar MRI today rule out femoral neck fracture/lumbar fracture  Aspirin Plavix held  Will hold Lantus if any surgical plan    Fasting sugars elevated-increasing dose of Lantus to 14  Creatinine normal blood pressure

## 2024-01-23 NOTE — PROGRESS NOTES
[facility]   OCCUPATIONAL THERAPY MISSED TREATMENT NOTE   INPATIENT   Date: 24  Patient Name: Komal Crawford       Room:   MRN: [id number]   Account #: [id number]    : 1940  (83 y.o.)  Gender: female                 REASON FOR MISSED TREATMENT:  24    -    Per chart review, pt is scheduled for L TKA revision with OSS prosthesis today at 1500. Will await orders and see pt post-op.       0903       Electronically signed by ELDA Jonas on 24 at 9:01 AM EST

## 2024-01-23 NOTE — PLAN OF CARE
Problem: Discharge Planning  Goal: Discharge to home or other facility with appropriate resources  Outcome: Progressing  Flowsheets (Taken 1/22/2024 2012)  Discharge to home or other facility with appropriate resources:   Identify barriers to discharge with patient and caregiver   Identify discharge learning needs (meds, wound care, etc)     Problem: Pain  Goal: Verbalizes/displays adequate comfort level or baseline comfort level  Outcome: Progressing  Flowsheets (Taken 1/22/2024 2202)  Verbalizes/displays adequate comfort level or baseline comfort level: Encourage patient to monitor pain and request assistance     Problem: Skin/Tissue Integrity  Goal: Absence of new skin breakdown  Description: 1.  Monitor for areas of redness and/or skin breakdown  2.  Assess vascular access sites hourly  3.  Every 4-6 hours minimum:  Change oxygen saturation probe site  4.  Every 4-6 hours:  If on nasal continuous positive airway pressure, respiratory therapy assess nares and determine need for appliance change or resting period.  Outcome: Progressing     Problem: ABCDS Injury Assessment  Goal: Absence of physical injury  Outcome: Progressing  Flowsheets (Taken 1/23/2024 0218)  Absence of Physical Injury: Implement safety measures based on patient assessment     Problem: Safety - Adult  Goal: Free from fall injury  Outcome: Progressing  Flowsheets (Taken 1/23/2024 0218)  Free From Fall Injury: Instruct family/caregiver on patient safety     Problem: Chronic Conditions and Co-morbidities  Goal: Patient's chronic conditions and co-morbidity symptoms are monitored and maintained or improved  Outcome: Progressing  Flowsheets (Taken 1/22/2024 2012)  Care Plan - Patient's Chronic Conditions and Co-Morbidity Symptoms are Monitored and Maintained or Improved: Monitor and assess patient's chronic conditions and comorbid symptoms for stability, deterioration, or improvement

## 2024-01-24 LAB
ANION GAP SERPL CALCULATED.3IONS-SCNC: 11 MMOL/L (ref 9–17)
BUN SERPL-MCNC: 31 MG/DL (ref 8–23)
CALCIUM SERPL-MCNC: 7.8 MG/DL (ref 8.6–10.4)
CHLORIDE SERPL-SCNC: 100 MMOL/L (ref 98–107)
CO2 SERPL-SCNC: 20 MMOL/L (ref 20–31)
CREAT SERPL-MCNC: 0.9 MG/DL (ref 0.5–0.9)
ERYTHROCYTE [DISTWIDTH] IN BLOOD BY AUTOMATED COUNT: 15.5 % (ref 11.5–14.9)
GFR SERPL CREATININE-BSD FRML MDRD: >60 ML/MIN/1.73M2
GLUCOSE BLD-MCNC: 199 MG/DL (ref 65–105)
GLUCOSE BLD-MCNC: 319 MG/DL (ref 65–105)
GLUCOSE BLD-MCNC: 358 MG/DL (ref 65–105)
GLUCOSE BLD-MCNC: 447 MG/DL (ref 65–105)
GLUCOSE SERPL-MCNC: 432 MG/DL (ref 70–99)
HCT VFR BLD AUTO: 24 % (ref 36–46)
HGB BLD-MCNC: 8.1 G/DL (ref 12–16)
MCH RBC QN AUTO: 29.8 PG (ref 26–34)
MCHC RBC AUTO-ENTMCNC: 33.8 G/DL (ref 31–37)
MCV RBC AUTO: 88.2 FL (ref 80–100)
PLATELET # BLD AUTO: 131 K/UL (ref 150–450)
PMV BLD AUTO: 12.9 FL (ref 6–12)
POTASSIUM SERPL-SCNC: 5 MMOL/L (ref 3.7–5.3)
RBC # BLD AUTO: 2.72 M/UL (ref 4–5.2)
SODIUM SERPL-SCNC: 131 MMOL/L (ref 135–144)
WBC OTHER # BLD: 11.3 K/UL (ref 3.5–11)

## 2024-01-24 PROCEDURE — 97530 THERAPEUTIC ACTIVITIES: CPT

## 2024-01-24 PROCEDURE — 6370000000 HC RX 637 (ALT 250 FOR IP): Performed by: ORTHOPAEDIC SURGERY

## 2024-01-24 PROCEDURE — 96376 TX/PRO/DX INJ SAME DRUG ADON: CPT

## 2024-01-24 PROCEDURE — 6360000002 HC RX W HCPCS: Performed by: ORTHOPAEDIC SURGERY

## 2024-01-24 PROCEDURE — 2580000003 HC RX 258: Performed by: ORTHOPAEDIC SURGERY

## 2024-01-24 PROCEDURE — 6370000000 HC RX 637 (ALT 250 FOR IP): Performed by: INTERNAL MEDICINE

## 2024-01-24 PROCEDURE — 1200000000 HC SEMI PRIVATE

## 2024-01-24 PROCEDURE — 97166 OT EVAL MOD COMPLEX 45 MIN: CPT

## 2024-01-24 PROCEDURE — 80048 BASIC METABOLIC PNL TOTAL CA: CPT

## 2024-01-24 PROCEDURE — 36415 COLL VENOUS BLD VENIPUNCTURE: CPT

## 2024-01-24 PROCEDURE — 82947 ASSAY GLUCOSE BLOOD QUANT: CPT

## 2024-01-24 PROCEDURE — 05HA33Z INSERTION OF INFUSION DEVICE INTO LEFT BRACHIAL VEIN, PERCUTANEOUS APPROACH: ICD-10-PCS | Performed by: ORTHOPAEDIC SURGERY

## 2024-01-24 PROCEDURE — 99222 1ST HOSP IP/OBS MODERATE 55: CPT | Performed by: PHYSICAL MEDICINE & REHABILITATION

## 2024-01-24 PROCEDURE — 97163 PT EVAL HIGH COMPLEX 45 MIN: CPT

## 2024-01-24 PROCEDURE — 85027 COMPLETE CBC AUTOMATED: CPT

## 2024-01-24 PROCEDURE — 99233 SBSQ HOSP IP/OBS HIGH 50: CPT | Performed by: INTERNAL MEDICINE

## 2024-01-24 RX ORDER — POLYETHYLENE GLYCOL 3350 17 G/17G
17 POWDER, FOR SOLUTION ORAL DAILY
Status: DISCONTINUED | OUTPATIENT
Start: 2024-01-24 | End: 2024-01-26 | Stop reason: HOSPADM

## 2024-01-24 RX ORDER — INSULIN GLARGINE 100 [IU]/ML
14 INJECTION, SOLUTION SUBCUTANEOUS ONCE
Status: COMPLETED | OUTPATIENT
Start: 2024-01-24 | End: 2024-01-24

## 2024-01-24 RX ORDER — INSULIN LISPRO 100 [IU]/ML
8 INJECTION, SOLUTION INTRAVENOUS; SUBCUTANEOUS ONCE
Status: COMPLETED | OUTPATIENT
Start: 2024-01-24 | End: 2024-01-24

## 2024-01-24 RX ADMIN — ROPINIROLE HYDROCHLORIDE 4 MG: 2 TABLET, FILM COATED ORAL at 19:58

## 2024-01-24 RX ADMIN — PRIMIDONE 50 MG: 50 TABLET ORAL at 19:58

## 2024-01-24 RX ADMIN — PRIMIDONE 50 MG: 50 TABLET ORAL at 10:29

## 2024-01-24 RX ADMIN — BISACODYL 5 MG: 5 TABLET, COATED ORAL at 15:14

## 2024-01-24 RX ADMIN — OXYCODONE HYDROCHLORIDE 10 MG: 10 TABLET ORAL at 21:53

## 2024-01-24 RX ADMIN — RANOLAZINE 500 MG: 500 TABLET, EXTENDED RELEASE ORAL at 19:58

## 2024-01-24 RX ADMIN — RANOLAZINE 500 MG: 500 TABLET, EXTENDED RELEASE ORAL at 10:30

## 2024-01-24 RX ADMIN — METOPROLOL SUCCINATE 12.5 MG: 25 TABLET, EXTENDED RELEASE ORAL at 17:36

## 2024-01-24 RX ADMIN — RIVAROXABAN 10 MG: 10 TABLET, FILM COATED ORAL at 17:36

## 2024-01-24 RX ADMIN — ISOSORBIDE MONONITRATE 30 MG: 30 TABLET, EXTENDED RELEASE ORAL at 17:36

## 2024-01-24 RX ADMIN — INSULIN LISPRO 8 UNITS: 100 INJECTION, SOLUTION INTRAVENOUS; SUBCUTANEOUS at 11:28

## 2024-01-24 RX ADMIN — ACETAMINOPHEN 1000 MG: 500 TABLET ORAL at 09:18

## 2024-01-24 RX ADMIN — FENTANYL CITRATE 50 MCG: 0.05 INJECTION, SOLUTION INTRAMUSCULAR; INTRAVENOUS at 13:36

## 2024-01-24 RX ADMIN — ACETAMINOPHEN 650 MG: 325 TABLET, FILM COATED ORAL at 02:23

## 2024-01-24 RX ADMIN — ACETAMINOPHEN 650 MG: 325 TABLET, FILM COATED ORAL at 19:58

## 2024-01-24 RX ADMIN — INSULIN LISPRO 8 UNITS: 100 INJECTION, SOLUTION INTRAVENOUS; SUBCUTANEOUS at 09:33

## 2024-01-24 RX ADMIN — POLYETHYLENE GLYCOL 3350 17 G: 17 POWDER, FOR SOLUTION ORAL at 15:13

## 2024-01-24 RX ADMIN — INSULIN GLARGINE 14 UNITS: 100 INJECTION, SOLUTION SUBCUTANEOUS at 09:33

## 2024-01-24 RX ADMIN — VANCOMYCIN HYDROCHLORIDE 1000 MG: 1 INJECTION, POWDER, LYOPHILIZED, FOR SOLUTION INTRAVENOUS at 02:19

## 2024-01-24 RX ADMIN — INSULIN LISPRO 6 UNITS: 100 INJECTION, SOLUTION INTRAVENOUS; SUBCUTANEOUS at 17:36

## 2024-01-24 RX ADMIN — SODIUM CHLORIDE, POTASSIUM CHLORIDE, SODIUM LACTATE AND CALCIUM CHLORIDE: 600; 310; 30; 20 INJECTION, SOLUTION INTRAVENOUS at 11:28

## 2024-01-24 RX ADMIN — FENTANYL CITRATE 50 MCG: 0.05 INJECTION, SOLUTION INTRAMUSCULAR; INTRAVENOUS at 18:36

## 2024-01-24 RX ADMIN — ATORVASTATIN CALCIUM 10 MG: 10 TABLET, FILM COATED ORAL at 19:58

## 2024-01-24 RX ADMIN — SODIUM CHLORIDE, POTASSIUM CHLORIDE, SODIUM LACTATE AND CALCIUM CHLORIDE: 600; 310; 30; 20 INJECTION, SOLUTION INTRAVENOUS at 19:45

## 2024-01-24 RX ADMIN — ACETAMINOPHEN 650 MG: 325 TABLET, FILM COATED ORAL at 17:40

## 2024-01-24 RX ADMIN — ASPIRIN 81 MG: 81 TABLET, COATED ORAL at 10:30

## 2024-01-24 RX ADMIN — ASPIRIN 81 MG: 81 TABLET, COATED ORAL at 09:18

## 2024-01-24 RX ADMIN — AMLODIPINE BESYLATE 2.5 MG: 2.5 TABLET ORAL at 10:30

## 2024-01-24 ASSESSMENT — PAIN SCALES - GENERAL
PAINLEVEL_OUTOF10: 4
PAINLEVEL_OUTOF10: 1
PAINLEVEL_OUTOF10: 7
PAINLEVEL_OUTOF10: 9
PAINLEVEL_OUTOF10: 8

## 2024-01-24 ASSESSMENT — PAIN DESCRIPTION - LOCATION
LOCATION: LEG

## 2024-01-24 ASSESSMENT — PAIN DESCRIPTION - DESCRIPTORS
DESCRIPTORS: ACHING
DESCRIPTORS: STABBING;SHOOTING
DESCRIPTORS: ACHING;SHOOTING

## 2024-01-24 ASSESSMENT — PAIN DESCRIPTION - ORIENTATION
ORIENTATION: LEFT

## 2024-01-24 NOTE — PROGRESS NOTES
Function  LUE AROM (degrees)  LUE AROM : WFL  Left Hand AROM (degrees)  Left Hand AROM: WFL  Tone LUE  LUE Tone: Normotonic  LUE Strength  Gross LUE Strength: WFL  L Hand General: 4/5    RUE AROM (degrees)  RUE AROM : WFL  Right Hand AROM (degrees)  Right Hand AROM: WFL  Tone RUE  RUE Tone: Normotonic  RUE Strength  Gross RUE Strength: WFL  R Hand General: 4/5    Hand Dominance  Hand Dominance: Right    Fine Motor Skills/Coordination  Coordination  Movements Are Fluid And Coordinated: Yes              Bed Mobility  Bed mobility  Rolling to Right: 2 Person assistance, Moderate assistance  Supine to Sit: 2 Person assistance, Moderate assistance  Sit to Supine: Unable to assess (Pt sitting in chair at end of session)  Scooting: Moderate assistance  Bed Mobility Comments: Bed mobility completed with HOB elevated with use of BR and increased time. 2 person A  to complete.Pt reports lightheadedness sitting EOB initially.    Balance  Balance  Sitting Balance: Stand by assistance  Standing Balance: Dependent/Total (Max A x 2)       Transfers  Transfers  Sit to stand: 2 Person assistance, Maximum assistance  Stand to sit: 2 Person assistance, Maximum assistance  Transfer Comments: Verbal cues for hand placement and safety with increased time. Initially transfer attempted with use of RW. Pt unable to fully stand upright with Max A x 2. additional transfer completed with use of marisol stedy with Max A x 2. Bed to chair transfer completed with use of marisol stedy    Functional Mobility  Functional Mobility Comments: Unable to complete at this time. Marisol stedy used for transfers.    Assessment  Assessment  Performance deficits / Impairments: Decreased ADL status, Decreased functional mobility , Decreased strength, Decreased endurance, Decreased balance, Decreased high-level IADLs, Decreased posture  Treatment Diagnosis: Impaired self care status  Prognosis: Good  Decision Making: Medium Complexity  Discharge Recommendations: Patient  would benefit from continued therapy after discharge    Activity Tolerance  Activity Tolerance: Patient Tolerated treatment well, Patient limited by pain    Safety Devices  Type of Devices: All fall risk precautions in place, Call light within reach, Chair alarm in place, Gait belt, Patient at risk for falls, Left in chair, Nurse notified    Patient Education  Patient Education  Education Given To: Patient  Education Provided: Role of Therapy, Plan of Care, Precautions, Transfer Training  Education Method: Verbal  Barriers to Learning: None  Education Outcome: Verbalized understanding, Demonstrated understanding, Continued education needed      Functional Outcome Measures  -PAC Daily Activity - Inpatient   How much help is needed for putting on and taking off regular lower body clothing?: Total  How much help is needed for bathing (which includes washing, rinsing, drying)?: Total  How much help is needed for toileting (which includes using toilet, bedpan, or urinal)?: Total  How much help is needed for putting on and taking off regular upper body clothing?: A Little  How much help is needed for taking care of personal grooming?: A Little  How much help for eating meals?: A Little  AM-[facility] Inpatient Daily Activity Raw Score: 12  AM-PAC Inpatient ADL T-Scale Score : 30.6  ADL Inpatient CMS 0-100% Score: 66.57  ADL Inpatient CMS G-Code Modifier : CL       Goals     Short Term Goals  Time Frame for Short Term Goals: By discharge  Short Term Goal 1: Pt will complete lower body dressing/bathing with Mod A and Good safety with use of AE as needed while maintaining Posterior hip precautions  Short Term Goal 2: Pt will complete functional transfers/mobility during self care tasks with Mod A and Good safety with use of least restrictive device while maintaining posterior hip precautions  Short Term Goal 3: Pt will tolerate standing 3+ minutes during functional activity of choice with Mod A and Good safety  Short Term Goal 4:

## 2024-01-24 NOTE — PROGRESS NOTES
Comments: JEFF - unable to complete, marisol sanchez used    OT Exercises  Pressure Relief Exercises: OTR/PTA facilitated pt's engagement in sit to stand trials to increased strength, balance, and activity tolerance needed to safely complete self-care and mobility tasks. Pt tolerated fairly well, trialed transfer with RW however pt unable to complete d/t unable to clear buttocks from bed or straighten L knee fully. Pt completed x2 sit to stand trials in marisol sanchez in preparation for safe completion of functional ADL transfers requiring Max A x2 to complete. Pt unable to tolerate standing longer than 5 seconds d/t increased pain and general weakness. Pt will continue to benefit from further strengthening and sit to stand transfers.    Patient Education  Patient Education  Education Given To: Patient  Education Provided: Role of Therapy, Plan of Care, Precautions, Transfer Training, Family Education  Education Method: Verbal  Barriers to Learning: None  Education Outcome: Verbalized understanding, Demonstrated understanding, Continued education needed    Goals  Short Term Goals  Time Frame for Short Term Goals: By discharge  Short Term Goal 1: Pt will complete lower body dressing/bathing with Mod A and Good safety with use of AE as needed while maintaining Posterior hip precautions  Short Term Goal 2: Pt will complete functional transfers/mobility during self care tasks with Mod A and Good safety with use of least restrictive device while maintaining posterior hip precautions  Short Term Goal 3: Pt will tolerate standing 3+ minutes during functional activity of choice with Mod A and Good safety  Short Term Goal 4: Pt will verbalize/demonstrate Good understanding of 3+ non pharmaceutical pain mangement  strategies to increase participation in daily activities and improve overall quality of life  Short Term Goal 5: Pt will participate in 15+ minutes of therapeutic exercises/functional activities to increase safety and  independence with self care and mobility  Occupational Therapy Plan  Times Per Week: 5-7 (1-2 times per day)  Current Treatment Recommendations: Self-Care / ADL, Strengthening, Balance training, Functional mobility training, Endurance training, Pain management, Safety education & training, Patient/Caregiver education & training, Equipment evaluation, education, & procurement, Home management training, Co-Treatment    Assessment  Activity Tolerance  Activity Tolerance: Patient Tolerated treatment well, Patient limited by pain, Patient limited by fatigue  Assessment  Performance deficits / Impairments: Decreased ADL status, Decreased functional mobility , Decreased strength, Decreased endurance, Decreased balance, Decreased high-level IADLs, Decreased posture  Treatment Diagnosis: Impaired self care status  Prognosis: Good  Decision Making: Medium Complexity  Discharge Recommendations: Patient would benefit from continued therapy after discharge  OT Equipment Recommendations  Other: TBD  Safety Devices  Type of Devices: All fall risk precautions in place, Bed alarm in place, Call light within reach, Gait belt, Heels elevated for pressure relief, Patient at risk for falls, Left in bed, Nurse notified    AM-[facility] Daily Activities Inpatient  AM-PAC Daily Activity - Inpatient   How much help is needed for putting on and taking off regular lower body clothing?: Total  How much help is needed for bathing (which includes washing, rinsing, drying)?: Total  How much help is needed for toileting (which includes using toilet, bedpan, or urinal)?: Total  How much help is needed for putting on and taking off regular upper body clothing?: A Little  How much help is needed for taking care of personal grooming?: A Little  How much help for eating meals?: A Little  AM-[facility] Inpatient Daily Activity Raw Score: 12  AM-PAC Inpatient ADL T-Scale Score : 30.6  ADL Inpatient CMS 0-100% Score: 66.57  ADL Inpatient CMS G-Code Modifier : CL    OT

## 2024-01-24 NOTE — CONSULTS
I have personally seen and examined the patient. I have collaborated with and supervised the  Minutes of Exercise per Session: 0 min   Stress: Stress Concern Present (11/29/2023)    [employer] - Occupational Stress Questionnaire     Feeling of Stress : To some extent   Social Connections: Moderately Integrated (11/29/2023)    Social Connection and Isolation Panel [NHANES]     Frequency of Communication with Friends and Family: Three times a week     Frequency of Social Gatherings with Friends and Family: Once a week     Attends [employer] Services: More than 4 times per year     Active Member of Clubs or Organizations: No     Attends Club or Organization Meetings: Never     Marital Status:    Intimate Partner Violence: Not on file   Housing Stability: Low Risk  (1/18/2024)    Housing Stability Vital Sign     Unable to Pay for Housing in the Last Year: No     Number of Places Lived in the Last Year: 1     Unstable Housing in the Last Year: No     Social/Functional History  Lives With: Spouse  Type of Home: Mobile home  Home Layout: One level  Home Access: Ramped entrance (2 rails)  Entrance Stairs - Number of Steps: There 4  steps at friends/neighbors home that she has to negotiate, with no rails  Entrance Stairs - Rails: Both (ramp has B rails)  Bathroom Shower/Tub: Walk-in shower, Curtain  Bathroom Toilet: Handicap height  Bathroom Equipment: Grab bars in shower, Shower chair, Grab bars around toilet, Hand-held shower  Bathroom Accessibility: Accessible, Walker accessible  Home Equipment: Cane, Rollator, Walker, rolling, Sock aid, Reacher (dressing stick; 3 wheel walker, multiple canes and walkers , uses  rubber maid step to steps in the vehicle)  Has the patient had two or more falls in the past year or any fall with injury in the past year?: Yes  ADL Assistance: Independent  Homemaking Assistance: Independent (pt is primary)  Homemaking Responsibilities: Yes (pt is primary)  Ambulation Assistance: Independent (RW in the house, Rollator in the community)  Transfer  nose normal bilaterally.      Diagnostics:  CBC   Lab Results   Component Value Date/Time    WBC 11.3 01/24/2024 06:46 AM    RBC 2.72 01/24/2024 06:46 AM    RBC 4.16 06/01/2023 08:59 AM    HGB 8.1 01/24/2024 06:46 AM    HCT 24.0 01/24/2024 06:46 AM    MCV 88.2 01/24/2024 06:46 AM    RDW 15.5 01/24/2024 06:46 AM     01/24/2024 06:46 AM     BMP    Lab Results   Component Value Date/Time     01/24/2024 06:46 AM    K 5.0 01/24/2024 06:46 AM     01/24/2024 06:46 AM    CO2 20 01/24/2024 06:46 AM    BUN 31 01/24/2024 06:46 AM     Uric Acid  No components found for: \"URIC\"  VITAMIN B12 No components found for: \"B12\"  PT/INR  No results found for: \"PTINR\"      Impression: Ms. Komal Crawford is a 83 y.o. female with a history of Left hip pain    Fall with right femoral neck impacted fracture status post right total hip arthroplasty 1/20 by Dr. 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 Keyes   coronary artery disease with stent August 2022, hypertension- aspirin daily, -, Norvasc, Lipitor, Imdur, Toprol, Ranexa, Plavix on hold  Chronic thrombocytopenia-138  Chronic anemia with blood loss anemia as well-hemoglobin 8.1 status post transfusion from 5.8  Diabetes uncontrolled glipizide on hold, Lantus  Chronic sciatic pain  Pain-Fentanyl, Norco, Dilaudid, Roxicodone, Zanaflex  Restless leg-Requip, primidone  GERD-Protonix  Hyponatremia sodium 131      Recommendations:  1. Diagnosis: Fall with left femoral neck impacted fracture status post left total hip arthroplasty 1/20 and left total knee revision 1/23  2. Therapy: Has PT and OT need  3. Medical  Necessity: As above  4. Support: Clarify  5. Rehab recommendation: Would benefit from acute inpatient rehabilitation when medically ready    -Patient receiving IV fentanyl, needs to be off IV pain medications and participate with oral

## 2024-01-24 NOTE — PROGRESS NOTES
[facility]   IN-PATIENT SERVICE   [facility]                Date:   1/24/2024  Patient name:  Komal Crawford  Date of admission:  1/17/2024 11:37 PM  MRN:   [id number]  Account:  [id number]  YOB: 1940  PCP:    Sabiha Bedolla MD  Room:   2059/2059-01  Code Status:    Full Code    Chief Complaint:     Chief Complaint   Patient presents with    Fall    Leg Injury     L leg       History Obtained From:     patient    History of Present Illness:     The patient is a 83 y.o.  Non- / non  female who presents with Fall and Leg Injury (L leg)   and she is admitted to the hospital for the management of        Komal Crawford is a 83 y.o. Non- / non  female who presents with Fall and Leg Injury (L leg)   and is admitted to the hospital for the management of Left hip pain.  Medical history significant for CAD, HTN, DM, TIA and chronic pain.  Presented to ED per EMS after mechanical fall.  States that she was attempting to  her dog when her left leg gave out.  States that she felt a snapping sensation along her left hip and leg.  Denies hitting her head or any other injury.  She does admit to chronic sciatic type pain and left hip pain.  She was unable to ambulate after incident prompting call to EMS.  X-ray imaging reveals moderate to severe osteoarthritis of left hip with no acute fracture identified.  Patient has had similar presentations in the past where she required admission and rehab placement at discharge.  Difficulty controlling pain in ED requiring IV pain medication.  Denies fever, chills, chest pain, cough, abdominal pain, nausea, vomiting, diarrhea, and urinary symptoms. Symptoms are aggravated by increased activity or movement of left leg.  Symptoms are alleviated by IV pain medications.  Symptoms are reported as acute, constant with increase during movement  Past Medical History:     Past Medical History:   Diagnosis Date    Age-related  control  Acute blood loss anemia hemoglobin 5.8 yesterday-status post 2 unit PRBC transfusion, hemoglobin 8.1 today, no evidence of active bleed, left hip appropriate post op Swelling no evidence of hematoma  Creatinine improved today to 1.1  Hyponatremia sodium 131 -patient is currently n.p.o.-will monitor only for now repeat BMP this afternoon.  Currently not on any IV fluids.  Aspirin Plavix still on hold, patient is n.p.o. will monitor sugars closely.  Rocephin day 2 for complicated UTI secondary to neurogenic bladder/recurrent straight cath, culture awaited-no fever, leukocytosis improving    1/23  Sodium improved to 134 today  Will repeat H&H as well  Scheduled for knee surgery this afternoon, aspirin Plavix remains on hold.  No growth urine culture-discontinuing Rocephin.  Blood sugars controlled  /  1/24  Patient underwent surgery yesterday  Reports feeling better no chest pain no nausea  Has not had bowel movement yet, no abdo tenderness, bowel sounds sluggish, pt tolerating diet.   Resuming MiraLAX  Aspirin Plavix and DVT prophylaxis to be resumed when okay with surgery  Patient should be discharged on DVT prophylaxis.  Blood sugars elevated today with relative hyponatremia-Lantus and sliding scale resumed will monitor closely  Hemoglobin stable  Pt on 3L NC oxygen, will try to wean off.         Consultations:   IP CONSULT TO SOCIAL WORK  IP CONSULT TO ORTHOPEDIC SURGERY  IP CONSULT TO SOCIAL WORK  IP CONSULT TO INTERNAL MEDICINE  IP CONSULT TO IV TEAM  IP CONSULT TO PHYSICAL MEDICINE REHAB  IP CONSULT TO SOCIAL WORK     Patient is admitted as inpatient status because of co-morbidities listed above, severity of signs and symptoms as outlined, requirement for current medical therapies and most importantly because of direct risk to patient if care not provided in a hospital setting.    Heydi Maya MD  1/24/2024  1:21 PM    Copy sent to Sabiha Christina MD    Please note that this chart was generated

## 2024-01-24 NOTE — PROGRESS NOTES
Physical Therapy  Facility/Department: [facility] MED SURG  Daily Treatment Note  NAME: Komal Crawford  : 1940  MRN: [id number]    Date of Service: 2024    Discharge Recommendations:  Patient would benefit from continued therapy after discharge, Therapy recommended at discharge, Patient able to tolerate 3hrs of therapy a day   PT Equipment Recommendations  Equipment Needed:  (TBD)    Patient Diagnosis(es): The primary encounter diagnosis was Left hip pain. Diagnoses of Osteoarthritis of left hip, unspecified osteoarthritis type and Inability to ambulate due to hip were also pertinent to this visit.    Assessment   Activity Tolerance: Patient limited by fatigue;Patient limited by pain;Patient limited by endurance  Equipment Needed:  (TBD)     Plan    Physical Therapy Plan  General Plan:  (1-2 X / day for 1 week)  Specific Instructions for Next Treatment: continue sit <> stand w/ Magda stedy until able to tolerate w/ wheeled walker, cotreat w/ OT, instruct in exercise program  Current Treatment Recommendations: Strengthening;ROM;Balance training;Functional mobility training;Transfer training;Gait training;Endurance training;Safety education & training;Patient/Caregiver education & training;Equipment evaluation, education, & procurement;Therapeutic activities;Co-Treatment;Positioning  PT Plan of Care:  (1-2 X / day for 1 week)     Restrictions  Restrictions/Precautions  Restrictions/Precautions: Fall Risk, Weight Bearing, Isolation (left FWBAT, IV left forearm, urinary catheter, MDRO)  Required Braces or Orthoses?: No  Implants present? : Metal implants (lumbar surgery, right THR, left THR, left TKR, cardiac stent)  Lower Extremity Weight Bearing Restrictions  Left Lower Extremity Weight Bearing: Weight Bearing As Tolerated  Position Activity Restriction  Hip Precautions: Posterior hip precautions  Other position/activity restrictions: Posterior hip precautions  Activity order:1)  Ambulate in room progressing to hallway  Term Goal 4: pt to demonstrate bed mobility w/ min x 2 for position change for rolling and supine <> sit  Short Term Goal 5: pt to demonstrate fair + or better sitting balance at the EOB w/ SBA and increase sitting tolerance to 15 minutes to engage the core muscles  Short Term Goal 6: pt to demonstrate sit <> stand transfers left LE FWBAT on the Magda Stedy w/ min x 2 and advance standing tolerance to 3 minutes demonstrating knee control  Short Term Goal 7: pt to advance to and demonstrate sit <> stand and bed <> chair transfers using a wheeled walker left LE FWBAT w/ min x 2 after demonstrating knee control on the Magda Stedy  Short Term Goal 8: pt to advance to and demonstrate gait w/ wheeled walker 10-20' left LE FWBAT w/ min x 2 and W/C follow after demonstrating knee control on the Magda Stedy  Patient Goals   Patient Goals : rehab then home w/ spouse    Education  Patient Education  Education Given To: Patient  Education Provided: Role of Therapy;Plan of Care  Education Provided Comments: Elevation, repositioning every two hours, energy conservation, pursed lip breathing with all mobility,  Education Method: Demonstration;Verbal  Barriers to Learning: None  Education Outcome: Continued education needed    AM-PAC - Mobility    AM-PAC Basic Mobility - Inpatient   How much help is needed turning from your back to your side while in a flat bed without using bedrails?: A Little  How much help is needed moving from lying on your back to sitting on the side of a flat bed without using bedrails?: A Little  How much help is needed moving to and from a bed to a chair?: Total  How much help is needed standing up from a chair using your arms?: Total  How much help is needed walking in hospital room?: Total  How much help is needed climbing 3-5 steps with a railing?: Total  AM-PAC Inpatient Mobility Raw Score : 10  AM-PAC Inpatient T-Scale Score : 32.29  Mobility Inpatient CMS 0-100% Score: 76.75  Mobility Inpatient CMS

## 2024-01-24 NOTE — ANESTHESIA POSTPROCEDURE EVALUATION
Department of Anesthesiology  Postprocedure Note    Patient: Komal Crawford  MRN: [id number]  YOB: 1940  Date of evaluation: 1/23/2024    Procedure Summary       Date: 01/23/24 Room / Location: [address]    Anesthesia Start: 1505 Anesthesia Stop: 1716    Procedure: KNEE TOTAL ARTHROPLASTY REVISION WITH OSS (Left: Knee) Diagnosis:       Periprosthetic fracture of femur at tip of prosthesis, initial encounter      (Periprosthetic fracture of femur at tip of prosthesis, initial encounter [M97.8XXA, Z96.649])    Surgeons: Damian Keyes MD Responsible Provider: Li Acuña MD    Anesthesia Type: general ASA Status: 3            Anesthesia Type: No value filed.    Lelo Phase I: Lelo Score: 8    Lelo Phase II:      Anesthesia Post Evaluation    Comments: POST- ANESTHESIA EVALUATION       Pt Name: Komal Crawford  MRN: [id number]  YOB: 1940  Date of evaluation: 1/23/2024  Time:  7:00 PM      BP (!) 114/59   Pulse 74   Temp 97.9 °F (36.6 °C) (Oral)   Resp 15   Ht 1.499 m (4' 11\")   Wt 68 kg (150 lb)   SpO2 94%   BMI 30.30 kg/m²      Consciousness Level  Awake  Cardiopulmonary Status  Stable  Pain Adequately Treated YES  Nausea / Vomiting  NO  Adequate Hydration  YES  Anesthesia Related Complications NONE      Electronically signed by Li Acuña MD on 1/23/2024 at 7:00 PM           No notable events documented.

## 2024-01-24 NOTE — CARE COORDINATION
DISCHARGE PLANNING NOTE:    Plan remains for patient to be ARU VS. [facility].    PM&R consult placed.    POD#1 Left total knee arthoplasty revision.    Will continue to follow for additional discharge needs.    Electronically signed by Belgica Rodney RN on 1/24/2024 at 2:01 PM

## 2024-01-24 NOTE — ANESTHESIA POSTPROCEDURE EVALUATION
Department of Anesthesiology  Postprocedure Note    Patient: Komal Crawford  MRN: [id number]  YOB: 1940  Date of evaluation: 1/24/2024    Procedure Summary     Date: 01/23/24 Room / Location: [address]    Anesthesia Start: 1505 Anesthesia Stop: 1716    Procedure: KNEE TOTAL ARTHROPLASTY REVISION WITH OSS (Left: Knee) Diagnosis:       Periprosthetic fracture of femur at tip of prosthesis, initial encounter      (Periprosthetic fracture of femur at tip of prosthesis, initial encounter [M97.8XXA, Z96.649])    Surgeons: Damian Keyes MD Responsible Provider: Li Acuña MD    Anesthesia Type: general ASA Status: 3          Anesthesia Type: No value filed.    Lelo Phase I: Lelo Score: 8    Lelo Phase II:      Anesthesia Post Evaluation    Comments: POD #1.  Patient seen at bedside.  No anesthesia complications reported.          No notable events documented.

## 2024-01-24 NOTE — PROGRESS NOTES
Surgical Progress Note    POD: 1    Patient doing fairly well  Vitals:    24 1336   BP:    Pulse:    Resp: 16   Temp:    SpO2:       Temp (24hrs), Av.3 °F (36.3 °C), Min:97 °F (36.1 °C), Max:97.9 °F (36.6 °C)       Pain Control excellent   No unusual nausea    Exam: slow progress with pt denies any pain        Lungs:  No respiratory distress    Labs reviewed:  Labs:  WBC/Hgb/Hct/Plts:  11.3/8.1/24.0/131 (646)  BUN/Cr/glu/ALT/AST/amyl/lip:  31/0.9/--/--/--/--/-- (646)     Na/K/Cl/CO2:  131/5.0/100/20 (646)    I/O last 3 completed shifts:  In: -   Out: 1675 [Urine:1675]    Assessment:    Patient Active Problem List   Diagnosis    TIA (transient ischemic attack)    Chronic back pain    Diabetic peripheral neuropathy ([facility])    Macular degeneration of left eye    Constipation    Thrombocytopenia ([facility])    B12 deficiency    Vitamin D deficiency    Anemia    DDD (degenerative disc disease), cervical    Age-related cognitive decline    Acquired cyst of kidney    Microscopic hematuria    Bilateral renal cysts    Essential hypertension    Gastroesophageal reflux disease without esophagitis    Mixed incontinence    Recurrent UTI    Pure hypercholesterolemia    Hiatal hernia    Diabetic gastroparesis ([facility])    Internal hemorrhoid    Tubular adenoma    Colon polyps    Urge incontinence    S/P lumbar fusion    Chronic ITP (idiopathic thrombocytopenia) ([facility])    Urinary retention    Unsteadiness    Anxiety    Arthritis    Nausea    Diarrhea    Extrarenal pelvis    Primary osteoarthritis of left knee    Type 2 diabetes mellitus with diabetic peripheral angiopathy without gangrene, without long-term current use of insulin ([facility])    Memory loss    Urethral pain    Bladder spasms    Atrophic vaginitis    Coronary artery disease involving native coronary artery of native heart without angina pectoris    Chronic fatigue    Essential tremor    Neuropathy    Coronary angioplasty status    Recurrent major

## 2024-01-24 NOTE — PROGRESS NOTES
Physical Therapy  Facility/Department: [facility] MED SURG  Physical Therapy Initial Assessment    Name: Komal Crawford  : 1940  MRN: [id number]  Date of Service: 2024    Discharge Recommendations:  Patient would benefit from continued therapy after discharge, Therapy recommended at discharge, Patient able to tolerate 3hrs of therapy a day   PT Equipment Recommendations  Equipment Needed:  (TBD)      Patient Diagnosis(es): The primary encounter diagnosis was Left hip pain. Diagnoses of Osteoarthritis of left hip, unspecified osteoarthritis type and Inability to ambulate due to hip were also pertinent to this visit.  Past Medical History:  has a past medical history of Age-related cognitive decline, Ankle pain, Anxiety, B12 deficiency, Winters esophagus, 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HCC), Caffeine use, Cerebral artery occlusion with cerebral infarction (HCC), Chronic back pain, Chronic ITP (idiopathic thrombocytopenia) ([facility]), CVA (cerebral infarction), Diabetic gastroparesis ([facility]), Diverticular disease, GERD (gastroesophageal reflux disease), Hiatal hernia, Hip fracture ([facility]), History of blood transfusion, History of decreased platelet count, Hyperlipemia, Hypertension, Internal hemorrhoid, Macular degeneration of left eye, Nausea and vomiting, Neuropathy, Osteoarthritis, Ringing in ears, Self-catheterizes urinary bladder, TIA (transient ischemic attack), Tubular adenoma, Type II or unspecified type diabetes mellitus without mention of complication, not stated as uncontrolled, Urinary incontinence, Wears dentures, and Wears glasses.  Past Surgical History:  has a past surgical history that includes bladder suspension (); Hysterectomy (); Tonsillectomy (); Appendectomy (); Spine surgery (); colostomy (); Revision Colostomy (); Spine surgery (); Upper gastrointestinal endoscopy; Bladder surgery; Upper gastrointestinal endoscopy (2014); cardiovascular stress test (2014); Colon

## 2024-01-25 LAB
ANION GAP SERPL CALCULATED.3IONS-SCNC: 7 MMOL/L (ref 9–17)
BUN SERPL-MCNC: 25 MG/DL (ref 8–23)
CALCIUM SERPL-MCNC: 7.9 MG/DL (ref 8.6–10.4)
CHLORIDE SERPL-SCNC: 106 MMOL/L (ref 98–107)
CO2 SERPL-SCNC: 22 MMOL/L (ref 20–31)
CREAT SERPL-MCNC: 0.7 MG/DL (ref 0.5–0.9)
ERYTHROCYTE [DISTWIDTH] IN BLOOD BY AUTOMATED COUNT: 15.9 % (ref 11.5–14.9)
GFR SERPL CREATININE-BSD FRML MDRD: >60 ML/MIN/1.73M2
GLUCOSE BLD-MCNC: 167 MG/DL (ref 65–105)
GLUCOSE BLD-MCNC: 196 MG/DL (ref 65–105)
GLUCOSE BLD-MCNC: 224 MG/DL (ref 65–105)
GLUCOSE BLD-MCNC: 225 MG/DL (ref 65–105)
GLUCOSE SERPL-MCNC: 180 MG/DL (ref 70–99)
HCT VFR BLD AUTO: 20.1 % (ref 36–46)
HCT VFR BLD AUTO: 21.5 % (ref 36–46)
HCT VFR BLD AUTO: 26.1 % (ref 36–46)
HGB BLD-MCNC: 6.4 G/DL (ref 12–16)
HGB BLD-MCNC: 6.7 G/DL (ref 12–16)
HGB BLD-MCNC: 8.4 G/DL (ref 12–16)
MCH RBC QN AUTO: 28.7 PG (ref 26–34)
MCHC RBC AUTO-ENTMCNC: 31.8 G/DL (ref 31–37)
MCV RBC AUTO: 90.5 FL (ref 80–100)
PLATELET # BLD AUTO: 133 K/UL (ref 150–450)
PMV BLD AUTO: 12.6 FL (ref 6–12)
POTASSIUM SERPL-SCNC: 4.7 MMOL/L (ref 3.7–5.3)
RBC # BLD AUTO: 2.23 M/UL (ref 4–5.2)
SODIUM SERPL-SCNC: 135 MMOL/L (ref 135–144)
WBC OTHER # BLD: 9.4 K/UL (ref 3.5–11)

## 2024-01-25 PROCEDURE — 2580000003 HC RX 258: Performed by: ORTHOPAEDIC SURGERY

## 2024-01-25 PROCEDURE — 36430 TRANSFUSION BLD/BLD COMPNT: CPT

## 2024-01-25 PROCEDURE — 6370000000 HC RX 637 (ALT 250 FOR IP): Performed by: ORTHOPAEDIC SURGERY

## 2024-01-25 PROCEDURE — 51701 INSERT BLADDER CATHETER: CPT

## 2024-01-25 PROCEDURE — 85014 HEMATOCRIT: CPT

## 2024-01-25 PROCEDURE — 86920 COMPATIBILITY TEST SPIN: CPT

## 2024-01-25 PROCEDURE — 82947 ASSAY GLUCOSE BLOOD QUANT: CPT

## 2024-01-25 PROCEDURE — P9016 RBC LEUKOCYTES REDUCED: HCPCS

## 2024-01-25 PROCEDURE — 86850 RBC ANTIBODY SCREEN: CPT

## 2024-01-25 PROCEDURE — 85018 HEMOGLOBIN: CPT

## 2024-01-25 PROCEDURE — 85027 COMPLETE CBC AUTOMATED: CPT

## 2024-01-25 PROCEDURE — 36415 COLL VENOUS BLD VENIPUNCTURE: CPT

## 2024-01-25 PROCEDURE — 1200000000 HC SEMI PRIVATE

## 2024-01-25 PROCEDURE — 86900 BLOOD TYPING SEROLOGIC ABO: CPT

## 2024-01-25 PROCEDURE — 99232 SBSQ HOSP IP/OBS MODERATE 35: CPT | Performed by: PHYSICAL MEDICINE & REHABILITATION

## 2024-01-25 PROCEDURE — 6370000000 HC RX 637 (ALT 250 FOR IP): Performed by: INTERNAL MEDICINE

## 2024-01-25 PROCEDURE — 86901 BLOOD TYPING SEROLOGIC RH(D): CPT

## 2024-01-25 PROCEDURE — 99233 SBSQ HOSP IP/OBS HIGH 50: CPT | Performed by: INTERNAL MEDICINE

## 2024-01-25 PROCEDURE — 80048 BASIC METABOLIC PNL TOTAL CA: CPT

## 2024-01-25 RX ORDER — SODIUM CHLORIDE 9 MG/ML
INJECTION, SOLUTION INTRAVENOUS PRN
Status: DISCONTINUED | OUTPATIENT
Start: 2024-01-25 | End: 2024-01-26 | Stop reason: HOSPADM

## 2024-01-25 RX ORDER — HYDROCODONE BITARTRATE AND ACETAMINOPHEN 5; 325 MG/1; MG/1
1 TABLET ORAL EVERY 6 HOURS PRN
Qty: 28 TABLET | Refills: 0 | Status: ON HOLD | OUTPATIENT
Start: 2024-01-25 | End: 2024-02-01

## 2024-01-25 RX ADMIN — ASPIRIN 81 MG: 81 TABLET, COATED ORAL at 09:22

## 2024-01-25 RX ADMIN — RANOLAZINE 500 MG: 500 TABLET, EXTENDED RELEASE ORAL at 19:57

## 2024-01-25 RX ADMIN — PANTOPRAZOLE SODIUM 40 MG: 40 TABLET, DELAYED RELEASE ORAL at 06:15

## 2024-01-25 RX ADMIN — METOPROLOL SUCCINATE 12.5 MG: 25 TABLET, EXTENDED RELEASE ORAL at 18:24

## 2024-01-25 RX ADMIN — POLYETHYLENE GLYCOL 3350 17 G: 17 POWDER, FOR SOLUTION ORAL at 09:23

## 2024-01-25 RX ADMIN — SODIUM CHLORIDE, PRESERVATIVE FREE 10 ML: 5 INJECTION INTRAVENOUS at 09:26

## 2024-01-25 RX ADMIN — RANOLAZINE 500 MG: 500 TABLET, EXTENDED RELEASE ORAL at 09:23

## 2024-01-25 RX ADMIN — INSULIN LISPRO 2 UNITS: 100 INJECTION, SOLUTION INTRAVENOUS; SUBCUTANEOUS at 18:33

## 2024-01-25 RX ADMIN — SODIUM CHLORIDE, PRESERVATIVE FREE 10 ML: 5 INJECTION INTRAVENOUS at 20:01

## 2024-01-25 RX ADMIN — INSULIN LISPRO 2 UNITS: 100 INJECTION, SOLUTION INTRAVENOUS; SUBCUTANEOUS at 12:35

## 2024-01-25 RX ADMIN — AMLODIPINE BESYLATE 2.5 MG: 2.5 TABLET ORAL at 09:23

## 2024-01-25 RX ADMIN — PRIMIDONE 50 MG: 50 TABLET ORAL at 11:40

## 2024-01-25 RX ADMIN — PRIMIDONE 50 MG: 50 TABLET ORAL at 19:57

## 2024-01-25 RX ADMIN — INSULIN GLARGINE 14 UNITS: 100 INJECTION, SOLUTION SUBCUTANEOUS at 19:58

## 2024-01-25 RX ADMIN — CLOPIDOGREL BISULFATE 75 MG: 75 TABLET ORAL at 09:23

## 2024-01-25 RX ADMIN — SODIUM CHLORIDE, POTASSIUM CHLORIDE, SODIUM LACTATE AND CALCIUM CHLORIDE: 600; 310; 30; 20 INJECTION, SOLUTION INTRAVENOUS at 03:34

## 2024-01-25 RX ADMIN — ISOSORBIDE MONONITRATE 30 MG: 30 TABLET, EXTENDED RELEASE ORAL at 18:24

## 2024-01-25 RX ADMIN — ACETAMINOPHEN 650 MG: 325 TABLET, FILM COATED ORAL at 19:57

## 2024-01-25 RX ADMIN — ACETAMINOPHEN 650 MG: 325 TABLET, FILM COATED ORAL at 09:23

## 2024-01-25 RX ADMIN — ATORVASTATIN CALCIUM 10 MG: 10 TABLET, FILM COATED ORAL at 19:57

## 2024-01-25 RX ADMIN — ROPINIROLE HYDROCHLORIDE 4 MG: 2 TABLET, FILM COATED ORAL at 19:58

## 2024-01-25 RX ADMIN — ACETAMINOPHEN 650 MG: 325 TABLET, FILM COATED ORAL at 18:23

## 2024-01-25 RX ADMIN — OXYCODONE HYDROCHLORIDE 10 MG: 10 TABLET ORAL at 09:24

## 2024-01-25 ASSESSMENT — PAIN SCALES - GENERAL
PAINLEVEL_OUTOF10: 3
PAINLEVEL_OUTOF10: 2
PAINLEVEL_OUTOF10: 0
PAINLEVEL_OUTOF10: 7
PAINLEVEL_OUTOF10: 0

## 2024-01-25 ASSESSMENT — PAIN DESCRIPTION - LOCATION
LOCATION: LEG
LOCATION: HIP;KNEE

## 2024-01-25 ASSESSMENT — PAIN DESCRIPTION - ORIENTATION
ORIENTATION: LEFT
ORIENTATION: LEFT

## 2024-01-25 NOTE — PROGRESS NOTES
Occupational Therapy  [facility]   OCCUPATIONAL THERAPY MISSED TREATMENT NOTE   INPATIENT   Date: 24  Patient Name: Komal Crawford       Room:   MRN: [id number]   Account #: [id number]    : 1940  (83 y.o.)  Gender: female   Referring Practitioner: Jon Bonner MD; Damian Keyes MD  Diagnosis: Left hip pain             REASON FOR MISSED TREATMENT:  Hold treatment per nursing request   900 -    Awaiting recheck of Hgb at this time. Will continue to follow.        Electronically signed by BRIANNA Mcnair on 24 at 12:03 PM EST

## 2024-01-25 NOTE — PROGRESS NOTES
[facility]   IN-PATIENT SERVICE   [facility]                Date:   1/25/2024  Patient name:  Komal Crawford  Date of admission:  1/17/2024 11:37 PM  MRN:   [id number]  Account:  [id number]  YOB: 1940  PCP:    Sabiha Bedolla MD  Room:   2059/2059-01  Code Status:    Full Code    Chief Complaint:     Chief Complaint   Patient presents with    Fall    Leg Injury     L leg       History Obtained From:     patient    History of Present Illness:     The patient is a 83 y.o.  Non- / non  female who presents with Fall and Leg Injury (L leg)   and she is admitted to the hospital for the management of        Komal Crawford is a 83 y.o. Non- / non  female who presents with Fall and Leg Injury (L leg)   and is admitted to the hospital for the management of Left hip pain.  Medical history significant for CAD, HTN, DM, TIA and chronic pain.  Presented to ED per EMS after mechanical fall.  States that she was attempting to  her dog when her left leg gave out.  States that she felt a snapping sensation along her left hip and leg.  Denies hitting her head or any other injury.  She does admit to chronic sciatic type pain and left hip pain.  She was unable to ambulate after incident prompting call to EMS.  X-ray imaging reveals moderate to severe osteoarthritis of left hip with no acute fracture identified.  Patient has had similar presentations in the past where she required admission and rehab placement at discharge.  Difficulty controlling pain in ED requiring IV pain medication.  Denies fever, chills, chest pain, cough, abdominal pain, nausea, vomiting, diarrhea, and urinary symptoms. Symptoms are aggravated by increased activity or movement of left leg.  Symptoms are alleviated by IV pain medications.  Symptoms are reported as acute, constant with increase during movement  Past Medical History:     Past Medical History:   Diagnosis Date    Age-related

## 2024-01-25 NOTE — PROGRESS NOTES
DATE: 2024    NAME: Komal Crawford  MRN: [id number]   : 1940    Patient not seen this date for Physical Therapy due to:      [x] Cancel by RN or physician due to: Nursing deferred treatment in a.m. d/t low HGB, 2nd attempt patient receiving blood, will continue to pursue at a later date.      [] Hemodialysis    [] Critical Lab Value Level     [] Blood transfusion in progress    [] Acute or unstable cardiovascular status   _MAP < 55 or more than >115  _HR < 40 or > 130    [] Acute or unstable pulmonary status   -FiO2 > 60%   _RR < 5 or >40    _O2 sats < 85%    [] Strict Bedrest    [] Off Unit for surgery or procedure    [] Off Unit for testing       [] Pending imaging to R/O fracture    [] Refusal by Patient      [] Other      [] PT being discontinued at this time. Patient independent. No further needs.     [] PT being discontinued at this time as the patient has been transferred to hospice care. No further needs.      Erica Diop, PTA

## 2024-01-25 NOTE — PROGRESS NOTES
Evangelina lead RN was notified of Hgb recheck = 6.7.  She will notify Dr. Bonner of this critical lab value.

## 2024-01-25 NOTE — PROGRESS NOTES
SC visit with patient and two of her friends; patient provided medical update and welcomed prayer     01/24/24 1955   Encounter Summary   Encounter Overview/Reason  Spiritual/Emotional Needs   Service Provided For: Patient and family together   Referral/Consult From: [facility]   Support System Friends/neighbors   Last Encounter  01/24/24   Complexity of Encounter Moderate   Spiritual/Emotional needs   Type Spiritual Support   Assessment/Intervention/Outcome   Assessment Calm;Coping;Hopeful   Intervention Active listening;Discussed illness injury and it’s impact;Explored/Affirmed feelings, thoughts, concerns;Prayer (assurance of)/[place];Sustaining Presence/Ministry of presence   Outcome Coping;Engaged in conversation;Expressed feelings, needs, and concerns;Expressed Gratitude;Receptive

## 2024-01-25 NOTE — CONSENT
Informed Consent for Blood Component Transfusion Note    I have discussed with the patient the rationale for blood component transfusion; its benefits in treating or preventing fatigue, organ damage, or death; and its risk which includes mild transfusion reactions, rare risk of blood borne infection, or more serious but rare reactions. I have discussed the alternatives to transfusion, including the risk and consequences of not receiving transfusion. The patient had an opportunity to ask questions and had agreed to proceed with transfusion of blood components.    Electronically signed by Heydi Maya MD on 1/25/24 at 9:23 AM EST

## 2024-01-25 NOTE — PROGRESS NOTES
Surgical Progress Note    POD:      Patient doing well  Vitals:    24 1120   BP:    Pulse:    Resp:    Temp:    SpO2: 92%      Temp (24hrs), Av.9 °F (36.6 °C), Min:97.8 °F (36.6 °C), Max:98.1 °F (36.7 °C)       Pain Control excellent  No unusual nausea    Exam: difficulty walking no pain        Lungs:  No respiratory distress    Labs reviewed:  Labs:  WBC/Hgb/Hct/Plts:  --/6.7/21.5/-- (837)  BUN/Cr/glu/ALT/AST/amyl/lip:  25/0.7/--/--/--/--/-- (602)     Na/K/Cl/CO2:  135/4.7/106/22 (602)    I/O last 3 completed shifts:  In: -   Out:  [Urine:]    Assessment:    Patient Active Problem List   Diagnosis    TIA (transient ischemic attack)    Chronic back pain    Diabetic peripheral neuropathy (HCC)    Macular degeneration of left eye    Constipation    Thrombocytopenia (HCC)    B12 deficiency    Vitamin D deficiency    Anemia    DDD (degenerative disc disease), cervical    Age-related cognitive decline    Acquired cyst of kidney    Microscopic hematuria    Bilateral renal cysts    Essential hypertension    Gastroesophageal reflux disease without esophagitis    Mixed incontinence    Recurrent UTI    Pure hypercholesterolemia    Hiatal hernia    Diabetic gastroparesis (HCC)    Internal hemorrhoid    Tubular adenoma    Colon polyps    Urge incontinence    S/P lumbar fusion    Chronic ITP (idiopathic thrombocytopenia) (HCC)    Urinary retention    Unsteadiness    Anxiety    Arthritis    Nausea    Diarrhea    Extrarenal pelvis    Primary osteoarthritis of left knee    Type 2 diabetes mellitus with diabetic peripheral angiopathy without gangrene, without long-term current use of insulin (HCC)    Memory loss    Urethral pain    Bladder spasms    Atrophic vaginitis    Coronary artery disease involving native coronary artery of native heart without angina pectoris    Chronic fatigue    Essential tremor    Neuropathy    Coronary angioplasty status    Recurrent major depressive disorder, in

## 2024-01-25 NOTE — PLAN OF CARE
Problem: Discharge Planning  Goal: Discharge to home or other facility with appropriate resources  1/25/2024 0437 by Sandhya Carter RN  Outcome: Progressing     Problem: Pain  Goal: Verbalizes/displays adequate comfort level or baseline comfort level  1/25/2024 0437 by Sandhya Carter RN  Outcome: Progressing     Problem: Skin/Tissue Integrity  Goal: Absence of new skin breakdown  Description: 1.  Monitor for areas of redness and/or skin breakdown  2.  Assess vascular access sites hourly  3.  Every 4-6 hours minimum:  Change oxygen saturation probe site  4.  Every 4-6 hours:  If on nasal continuous positive airway pressure, respiratory therapy assess nares and determine need for appliance change or resting period.  1/25/2024 0437 by Sandhya Carter RN  Outcome: Progressing     Problem: ABCDS Injury Assessment  Goal: Absence of physical injury  1/25/2024 0437 by Sandhya Carter, RN  Outcome: Progressing

## 2024-01-25 NOTE — CARE COORDINATION
DISCHARGE PLANNING NOTE:    Spoke with [place] in ARU. She states can be accepted there once her hgb is stable (above 8) and she cannot be on IV pain medications.  Informed clin lead, Saad, of this.  Sharmaine from [facility] is also following.    POD#2 left ISHAN revision.    Will continue to follow for additional discharge needs.    Electronically signed by Belgica Rodney RN on 1/25/2024 at 12:33 PM

## 2024-01-25 NOTE — PROGRESS NOTES
Physical Medicine & Rehabilitation  Progress Note    1/25/2024 10:14 AM     CC: Ambulatory and ADL dysfunction due to right femoral neck impacted fracture status post right total hip arthroplasty 1/20 and left total knee revision due to periprosthetic femur fracture 1/23/2024    Noted plan for transfusion today    Subjective:   Patient feels well no complaints.  Notes no pain at rest.    ROS:  Denies fevers, chills, sweats.  No chest pain, palpitations, lightheadedness.  Denies coughing, wheezing or shortness of breath.  Denies abdominal pain, nausea, diarrhea or constipation.  No new areas of joint pain.  Denies new areas of numbness or weakness.  Denies new anxiety or depression issues.  No new skin problems.    Rehabilitation:   PT:  Restrictions/Precautions: Fall Risk, Weight Bearing, Isolation (left FWBAT, IV left forearm, urinary catheter, MDRO)  Implants present? : Metal implants (lumbar surgery, right THR, left THR, left TKR, cardiac stent)  Hip Precautions: Posterior hip precautions  Other position/activity restrictions: Posterior hip precautions  Activity order:1)  Ambulate in room progressing to hallway with assistive device four times daily (including PT) when PT advises.   2)  Bathroom privileges with assistance.  3) Up in bedside chair at least TID as tolerated.     Pt had a left HIP TOTAL ARTHROPLASTY by Dr. JORDYN Bonner on 1-. Pt had surgery on 1- due to a Periprosthetic fracture of femur at tip of prosthesis for a left KNEE TOTAL ARTHROPLASTY REVISION WITH OSS by Dr. AMEE Keyes.  Left Lower Extremity Weight Bearing: Weight Bearing As Tolerated   Transfers  Sit to Stand: Maximum Assistance, 2 Person Assistance  Stand to Sit: 2 Person Assistance, Maximum Assistance  Bed to Chair: Dependent/Total, 2 Person Assistance  Comment: attempted standing first time  w/ wheeled walker w/ max x 2 but unable to stand erect- bilateral knee buckling.  Therapists decided to use Magda Chand for increased  transfusion, will monitor stability of hemoglobin prior to transfer to rehab     -Decrease hemoglobin x 2 requiring second transfusion today discussed with internal medicine-noted plan for transfusion, possible dilutional however will follow hemoglobin to evaluate for cause of anemia-., noted plan to hold aspirin Plavix and Xarelto  -Pain-received IV fentanylx 2 yesterday 1/24-needs to be off IV pain medications and participate oral     6. DVT proph: Aspirin Plavix Xarelto on hold secondary anemia-questional DVT prophylaxis will need Doppler screen 24 to 48 hours prior to rehab if no DVT prophylaxis      It was my pleasure to evaluate Komal Crawford today.  Please call with questions.        Michael Eisenberg MD       This note is created with the assistance of a speech recognition program.  While intending to generate a document that actually reflects the content of the visit, the document can still have some errors including those of syntax and sound a like substitutions which may escape proof reading.  In such instances, actual meaning can be extrapolated by contextual diversion

## 2024-01-26 ENCOUNTER — HOSPITAL ENCOUNTER (INPATIENT)
Age: 84
LOS: 17 days | Discharge: HOME | DRG: 560 | End: 2024-02-12
Attending: STUDENT IN AN ORGANIZED HEALTH CARE EDUCATION/TRAINING PROGRAM | Admitting: STUDENT IN AN ORGANIZED HEALTH CARE EDUCATION/TRAINING PROGRAM
Payer: MEDICARE

## 2024-01-26 VITALS
RESPIRATION RATE: 18 BRPM | DIASTOLIC BLOOD PRESSURE: 59 MMHG | WEIGHT: 150 LBS | HEIGHT: 59 IN | BODY MASS INDEX: 30.24 KG/M2 | TEMPERATURE: 97.9 F | OXYGEN SATURATION: 92 % | HEART RATE: 71 BPM | SYSTOLIC BLOOD PRESSURE: 135 MMHG

## 2024-01-26 DIAGNOSIS — Z96.641 HISTORY OF TOTAL HIP ARTHROPLASTY, RIGHT: ICD-10-CM

## 2024-01-26 DIAGNOSIS — D62 ACUTE BLOOD LOSS ANEMIA: Primary | ICD-10-CM

## 2024-01-26 DIAGNOSIS — Z96.652 HISTORY OF TOTAL KNEE ARTHROPLASTY, LEFT: ICD-10-CM

## 2024-01-26 DIAGNOSIS — S72.452A DISPLACED SUPRACONDYLAR FRACTURE WITHOUT INTRACONDYLAR EXTENSION OF LOWER END OF LEFT FEMUR, INITIAL ENCOUNTER FOR CLOSED FRACTURE (HCC): ICD-10-CM

## 2024-01-26 DIAGNOSIS — S72.002A CLOSED DISPLACED FRACTURE OF LEFT FEMORAL NECK (HCC): ICD-10-CM

## 2024-01-26 DIAGNOSIS — S72.002D CLOSED FRACTURE OF LEFT HIP WITH ROUTINE HEALING: ICD-10-CM

## 2024-01-26 DIAGNOSIS — M97.12XA PERIPROSTHETIC FRACTURE AROUND INTERNAL PROSTHETIC LEFT KNEE JOINT, INITIAL ENCOUNTER: ICD-10-CM

## 2024-01-26 PROBLEM — S72.001P: Status: ACTIVE | Noted: 2024-01-26

## 2024-01-26 LAB
ANION GAP SERPL CALCULATED.3IONS-SCNC: 8 MMOL/L (ref 9–17)
BASOPHILS # BLD: 0 K/UL (ref 0–0.2)
BASOPHILS NFR BLD: 0 % (ref 0–2)
BUN SERPL-MCNC: 22 MG/DL (ref 8–23)
CALCIUM SERPL-MCNC: 7.9 MG/DL (ref 8.6–10.4)
CHLORIDE SERPL-SCNC: 104 MMOL/L (ref 98–107)
CO2 SERPL-SCNC: 23 MMOL/L (ref 20–31)
CREAT SERPL-MCNC: 0.7 MG/DL (ref 0.5–0.9)
EOSINOPHIL # BLD: 0.3 K/UL (ref 0–0.4)
EOSINOPHILS RELATIVE PERCENT: 3 % (ref 0–4)
ERYTHROCYTE [DISTWIDTH] IN BLOOD BY AUTOMATED COUNT: 16.4 % (ref 11.5–14.9)
GFR SERPL CREATININE-BSD FRML MDRD: >60 ML/MIN/1.73M2
GLUCOSE BLD-MCNC: 125 MG/DL (ref 65–105)
GLUCOSE BLD-MCNC: 163 MG/DL (ref 65–105)
GLUCOSE SERPL-MCNC: 165 MG/DL (ref 70–99)
HCT VFR BLD AUTO: 26.4 % (ref 36–46)
HGB BLD-MCNC: 8.4 G/DL (ref 12–16)
LYMPHOCYTES NFR BLD: 1.1 K/UL (ref 1–4.8)
LYMPHOCYTES RELATIVE PERCENT: 11 % (ref 24–44)
MCH RBC QN AUTO: 28.5 PG (ref 26–34)
MCHC RBC AUTO-ENTMCNC: 31.7 G/DL (ref 31–37)
MCV RBC AUTO: 90 FL (ref 80–100)
MONOCYTES NFR BLD: 0.6 K/UL (ref 0.1–1.3)
MONOCYTES NFR BLD: 6 % (ref 1–7)
NEUTROPHILS NFR BLD: 80 % (ref 36–66)
NEUTS SEG NFR BLD: 7.5 K/UL (ref 1.3–9.1)
PLATELET # BLD AUTO: 149 K/UL (ref 150–450)
PMV BLD AUTO: 12.7 FL (ref 6–12)
POTASSIUM SERPL-SCNC: 4.7 MMOL/L (ref 3.7–5.3)
RBC # BLD AUTO: 2.94 M/UL (ref 4–5.2)
SODIUM SERPL-SCNC: 135 MMOL/L (ref 135–144)
WBC OTHER # BLD: 9.5 K/UL (ref 3.5–11)

## 2024-01-26 PROCEDURE — 99213 OFFICE O/P EST LOW 20 MIN: CPT

## 2024-01-26 PROCEDURE — 6370000000 HC RX 637 (ALT 250 FOR IP): Performed by: INTERNAL MEDICINE

## 2024-01-26 PROCEDURE — 85025 COMPLETE CBC W/AUTO DIFF WBC: CPT

## 2024-01-26 PROCEDURE — 51798 US URINE CAPACITY MEASURE: CPT

## 2024-01-26 PROCEDURE — 6370000000 HC RX 637 (ALT 250 FOR IP): Performed by: ORTHOPAEDIC SURGERY

## 2024-01-26 PROCEDURE — 80048 BASIC METABOLIC PNL TOTAL CA: CPT

## 2024-01-26 PROCEDURE — 2580000003 HC RX 258: Performed by: ORTHOPAEDIC SURGERY

## 2024-01-26 PROCEDURE — 36415 COLL VENOUS BLD VENIPUNCTURE: CPT

## 2024-01-26 PROCEDURE — 82947 ASSAY GLUCOSE BLOOD QUANT: CPT

## 2024-01-26 PROCEDURE — 99239 HOSP IP/OBS DSCHRG MGMT >30: CPT | Performed by: INTERNAL MEDICINE

## 2024-01-26 PROCEDURE — 51701 INSERT BLADDER CATHETER: CPT

## 2024-01-26 PROCEDURE — 1180000000 HC REHAB R&B

## 2024-01-26 RX ORDER — PRIMIDONE 50 MG/1
50 TABLET ORAL 2 TIMES DAILY
Status: CANCELLED | OUTPATIENT
Start: 2024-01-26

## 2024-01-26 RX ORDER — AMLODIPINE BESYLATE 2.5 MG/1
2.5 TABLET ORAL EVERY MORNING
Status: DISCONTINUED | OUTPATIENT
Start: 2024-01-27 | End: 2024-01-29

## 2024-01-26 RX ORDER — ACETAMINOPHEN 650 MG/1
650 SUPPOSITORY RECTAL EVERY 6 HOURS PRN
Status: CANCELLED | OUTPATIENT
Start: 2024-01-26

## 2024-01-26 RX ORDER — PRIMIDONE 50 MG/1
50 TABLET ORAL 2 TIMES DAILY
Status: DISCONTINUED | OUTPATIENT
Start: 2024-01-26 | End: 2024-02-12 | Stop reason: HOSPADM

## 2024-01-26 RX ORDER — HYDROCODONE BITARTRATE AND ACETAMINOPHEN 5; 325 MG/1; MG/1
2 TABLET ORAL EVERY 4 HOURS PRN
Status: CANCELLED | OUTPATIENT
Start: 2024-01-26

## 2024-01-26 RX ORDER — ISOSORBIDE MONONITRATE 30 MG/1
30 TABLET, EXTENDED RELEASE ORAL EVERY EVENING
Status: DISCONTINUED | OUTPATIENT
Start: 2024-01-26 | End: 2024-02-12 | Stop reason: HOSPADM

## 2024-01-26 RX ORDER — ATORVASTATIN CALCIUM 10 MG/1
10 TABLET, FILM COATED ORAL NIGHTLY
Status: CANCELLED | OUTPATIENT
Start: 2024-01-26

## 2024-01-26 RX ORDER — METOPROLOL SUCCINATE 25 MG/1
12.5 TABLET, EXTENDED RELEASE ORAL EVERY EVENING
Status: CANCELLED | OUTPATIENT
Start: 2024-01-26

## 2024-01-26 RX ORDER — RANOLAZINE 500 MG/1
500 TABLET, EXTENDED RELEASE ORAL 2 TIMES DAILY
Status: CANCELLED | OUTPATIENT
Start: 2024-01-26

## 2024-01-26 RX ORDER — RANOLAZINE 500 MG/1
500 TABLET, EXTENDED RELEASE ORAL 2 TIMES DAILY
Status: DISCONTINUED | OUTPATIENT
Start: 2024-01-26 | End: 2024-02-12 | Stop reason: HOSPADM

## 2024-01-26 RX ORDER — ACETAMINOPHEN 650 MG/1
650 SUPPOSITORY RECTAL EVERY 6 HOURS PRN
Status: DISCONTINUED | OUTPATIENT
Start: 2024-01-26 | End: 2024-01-26

## 2024-01-26 RX ORDER — PANTOPRAZOLE SODIUM 40 MG/1
40 TABLET, DELAYED RELEASE ORAL
Status: DISCONTINUED | OUTPATIENT
Start: 2024-01-27 | End: 2024-02-12 | Stop reason: HOSPADM

## 2024-01-26 RX ORDER — PANTOPRAZOLE SODIUM 40 MG/1
40 TABLET, DELAYED RELEASE ORAL
Status: CANCELLED | OUTPATIENT
Start: 2024-01-27

## 2024-01-26 RX ORDER — HYDROCODONE BITARTRATE AND ACETAMINOPHEN 5; 325 MG/1; MG/1
1 TABLET ORAL EVERY 4 HOURS PRN
Status: CANCELLED | OUTPATIENT
Start: 2024-01-26

## 2024-01-26 RX ORDER — CLOPIDOGREL BISULFATE 75 MG/1
75 TABLET ORAL DAILY
Status: DISCONTINUED | OUTPATIENT
Start: 2024-01-27 | End: 2024-02-12 | Stop reason: HOSPADM

## 2024-01-26 RX ORDER — INSULIN LISPRO 100 [IU]/ML
0-8 INJECTION, SOLUTION INTRAVENOUS; SUBCUTANEOUS
Status: CANCELLED | OUTPATIENT
Start: 2024-01-26

## 2024-01-26 RX ORDER — BISACODYL 5 MG/1
5 TABLET, DELAYED RELEASE ORAL 2 TIMES DAILY PRN
Status: DISCONTINUED | OUTPATIENT
Start: 2024-01-26 | End: 2024-01-28

## 2024-01-26 RX ORDER — ACETAMINOPHEN 325 MG/1
650 TABLET ORAL EVERY 4 HOURS PRN
Status: DISCONTINUED | OUTPATIENT
Start: 2024-01-26 | End: 2024-01-26

## 2024-01-26 RX ORDER — GLIPIZIDE 5 MG/1
5 TABLET ORAL
Status: CANCELLED | OUTPATIENT
Start: 2024-01-27

## 2024-01-26 RX ORDER — ACETAMINOPHEN 325 MG/1
650 TABLET ORAL EVERY 6 HOURS
Status: DISCONTINUED | OUTPATIENT
Start: 2024-01-27 | End: 2024-02-12 | Stop reason: HOSPADM

## 2024-01-26 RX ORDER — AMLODIPINE BESYLATE 2.5 MG/1
2.5 TABLET ORAL EVERY MORNING
Status: CANCELLED | OUTPATIENT
Start: 2024-01-27

## 2024-01-26 RX ORDER — INSULIN GLARGINE 100 [IU]/ML
14 INJECTION, SOLUTION SUBCUTANEOUS NIGHTLY
Status: CANCELLED | OUTPATIENT
Start: 2024-01-26

## 2024-01-26 RX ORDER — INSULIN LISPRO 100 [IU]/ML
0-8 INJECTION, SOLUTION INTRAVENOUS; SUBCUTANEOUS
Status: DISCONTINUED | OUTPATIENT
Start: 2024-01-26 | End: 2024-02-12 | Stop reason: HOSPADM

## 2024-01-26 RX ORDER — CLOPIDOGREL BISULFATE 75 MG/1
75 TABLET ORAL DAILY
Status: CANCELLED | OUTPATIENT
Start: 2024-01-27

## 2024-01-26 RX ORDER — BISACODYL 10 MG
10 SUPPOSITORY, RECTAL RECTAL DAILY PRN
Status: DISCONTINUED | OUTPATIENT
Start: 2024-01-26 | End: 2024-02-12 | Stop reason: HOSPADM

## 2024-01-26 RX ORDER — INSULIN LISPRO 100 [IU]/ML
0-4 INJECTION, SOLUTION INTRAVENOUS; SUBCUTANEOUS NIGHTLY
Status: DISCONTINUED | OUTPATIENT
Start: 2024-01-26 | End: 2024-02-12 | Stop reason: HOSPADM

## 2024-01-26 RX ORDER — HYDROCODONE BITARTRATE AND ACETAMINOPHEN 5; 325 MG/1; MG/1
1 TABLET ORAL EVERY 4 HOURS PRN
Status: DISCONTINUED | OUTPATIENT
Start: 2024-01-26 | End: 2024-02-12 | Stop reason: HOSPADM

## 2024-01-26 RX ORDER — GLIPIZIDE 10 MG/1
5 TABLET ORAL
Status: DISCONTINUED | OUTPATIENT
Start: 2024-01-27 | End: 2024-02-12 | Stop reason: HOSPADM

## 2024-01-26 RX ORDER — BISACODYL 5 MG/1
5 TABLET, DELAYED RELEASE ORAL 2 TIMES DAILY PRN
Status: CANCELLED | OUTPATIENT
Start: 2024-01-26

## 2024-01-26 RX ORDER — METOPROLOL SUCCINATE 25 MG/1
12.5 TABLET, EXTENDED RELEASE ORAL EVERY EVENING
Status: DISCONTINUED | OUTPATIENT
Start: 2024-01-26 | End: 2024-02-12 | Stop reason: HOSPADM

## 2024-01-26 RX ORDER — ACETAMINOPHEN 325 MG/1
650 TABLET ORAL EVERY 4 HOURS PRN
Status: CANCELLED | OUTPATIENT
Start: 2024-01-26

## 2024-01-26 RX ORDER — POLYETHYLENE GLYCOL 3350 17 G/17G
17 POWDER, FOR SOLUTION ORAL DAILY
Status: DISCONTINUED | OUTPATIENT
Start: 2024-01-27 | End: 2024-01-26 | Stop reason: SDUPTHER

## 2024-01-26 RX ORDER — ACETAMINOPHEN 325 MG/1
650 TABLET ORAL EVERY 6 HOURS
Status: CANCELLED | OUTPATIENT
Start: 2024-01-26

## 2024-01-26 RX ORDER — INSULIN GLARGINE 100 [IU]/ML
14 INJECTION, SOLUTION SUBCUTANEOUS NIGHTLY
Status: DISCONTINUED | OUTPATIENT
Start: 2024-01-26 | End: 2024-01-30

## 2024-01-26 RX ORDER — SENNOSIDES A AND B 8.6 MG/1
2 TABLET, FILM COATED ORAL DAILY PRN
Status: CANCELLED | OUTPATIENT
Start: 2024-01-26

## 2024-01-26 RX ORDER — POLYETHYLENE GLYCOL 3350 17 G/17G
17 POWDER, FOR SOLUTION ORAL DAILY
Status: CANCELLED | OUTPATIENT
Start: 2024-01-27

## 2024-01-26 RX ORDER — TIZANIDINE 2 MG/1
2 TABLET ORAL NIGHTLY PRN
Status: CANCELLED | OUTPATIENT
Start: 2024-01-26

## 2024-01-26 RX ORDER — INSULIN LISPRO 100 [IU]/ML
0-4 INJECTION, SOLUTION INTRAVENOUS; SUBCUTANEOUS NIGHTLY
Status: CANCELLED | OUTPATIENT
Start: 2024-01-26

## 2024-01-26 RX ORDER — POLYETHYLENE GLYCOL 3350 17 G/17G
17 POWDER, FOR SOLUTION ORAL DAILY
Status: CANCELLED | OUTPATIENT
Start: 2024-01-26

## 2024-01-26 RX ORDER — BISACODYL 10 MG
10 SUPPOSITORY, RECTAL RECTAL DAILY PRN
Status: CANCELLED | OUTPATIENT
Start: 2024-01-26

## 2024-01-26 RX ORDER — ATORVASTATIN CALCIUM 10 MG/1
10 TABLET, FILM COATED ORAL NIGHTLY
Status: DISCONTINUED | OUTPATIENT
Start: 2024-01-26 | End: 2024-02-12 | Stop reason: HOSPADM

## 2024-01-26 RX ORDER — ROPINIROLE 1 MG/1
4 TABLET, FILM COATED ORAL NIGHTLY
Status: DISCONTINUED | OUTPATIENT
Start: 2024-01-26 | End: 2024-02-12 | Stop reason: HOSPADM

## 2024-01-26 RX ORDER — TIZANIDINE 2 MG/1
2 TABLET ORAL NIGHTLY PRN
Status: DISCONTINUED | OUTPATIENT
Start: 2024-01-26 | End: 2024-02-12 | Stop reason: HOSPADM

## 2024-01-26 RX ORDER — SENNOSIDES A AND B 8.6 MG/1
2 TABLET, FILM COATED ORAL DAILY PRN
Status: DISCONTINUED | OUTPATIENT
Start: 2024-01-26 | End: 2024-01-31

## 2024-01-26 RX ORDER — POLYETHYLENE GLYCOL 3350 17 G/17G
17 POWDER, FOR SOLUTION ORAL DAILY
Status: DISCONTINUED | OUTPATIENT
Start: 2024-01-27 | End: 2024-02-11

## 2024-01-26 RX ORDER — HYDROCODONE BITARTRATE AND ACETAMINOPHEN 5; 325 MG/1; MG/1
2 TABLET ORAL EVERY 4 HOURS PRN
Status: DISCONTINUED | OUTPATIENT
Start: 2024-01-26 | End: 2024-02-12 | Stop reason: HOSPADM

## 2024-01-26 RX ORDER — ISOSORBIDE MONONITRATE 30 MG/1
30 TABLET, EXTENDED RELEASE ORAL EVERY EVENING
Status: CANCELLED | OUTPATIENT
Start: 2024-01-26

## 2024-01-26 RX ORDER — ROPINIROLE 2 MG/1
4 TABLET, FILM COATED ORAL NIGHTLY
Status: CANCELLED | OUTPATIENT
Start: 2024-01-26

## 2024-01-26 RX ADMIN — RANOLAZINE 500 MG: 500 TABLET, EXTENDED RELEASE ORAL at 20:37

## 2024-01-26 RX ADMIN — INSULIN LISPRO 2 UNITS: 100 INJECTION, SOLUTION INTRAVENOUS; SUBCUTANEOUS at 08:58

## 2024-01-26 RX ADMIN — INSULIN GLARGINE 14 UNITS: 100 INJECTION, SOLUTION SUBCUTANEOUS at 20:26

## 2024-01-26 RX ADMIN — PANTOPRAZOLE SODIUM 40 MG: 40 TABLET, DELAYED RELEASE ORAL at 05:49

## 2024-01-26 RX ADMIN — RIVAROXABAN 10 MG: 10 TABLET, FILM COATED ORAL at 20:43

## 2024-01-26 RX ADMIN — ACETAMINOPHEN 650 MG: 325 TABLET, FILM COATED ORAL at 16:28

## 2024-01-26 RX ADMIN — ACETAMINOPHEN 650 MG: 325 TABLET, FILM COATED ORAL at 03:03

## 2024-01-26 RX ADMIN — ACETAMINOPHEN 650 MG: 325 TABLET, FILM COATED ORAL at 08:59

## 2024-01-26 RX ADMIN — RANOLAZINE 500 MG: 500 TABLET, EXTENDED RELEASE ORAL at 09:00

## 2024-01-26 RX ADMIN — ISOSORBIDE MONONITRATE 30 MG: 30 TABLET, EXTENDED RELEASE ORAL at 20:43

## 2024-01-26 RX ADMIN — SODIUM CHLORIDE, PRESERVATIVE FREE 10 ML: 5 INJECTION INTRAVENOUS at 09:02

## 2024-01-26 RX ADMIN — ROPINIROLE HYDROCHLORIDE 4 MG: 1 TABLET, FILM COATED ORAL at 20:26

## 2024-01-26 RX ADMIN — PRIMIDONE 50 MG: 50 TABLET ORAL at 09:00

## 2024-01-26 RX ADMIN — METOPROLOL SUCCINATE 12.5 MG: 25 TABLET, EXTENDED RELEASE ORAL at 20:29

## 2024-01-26 RX ADMIN — AMLODIPINE BESYLATE 2.5 MG: 2.5 TABLET ORAL at 09:00

## 2024-01-26 RX ADMIN — ASPIRIN 81 MG: 81 TABLET, COATED ORAL at 11:35

## 2024-01-26 RX ADMIN — PRIMIDONE 50 MG: 50 TABLET ORAL at 20:37

## 2024-01-26 RX ADMIN — GLIPIZIDE 5 MG: 5 TABLET ORAL at 11:37

## 2024-01-26 RX ADMIN — CLOPIDOGREL BISULFATE 75 MG: 75 TABLET ORAL at 11:36

## 2024-01-26 RX ADMIN — SODIUM CHLORIDE, PRESERVATIVE FREE 10 ML: 5 INJECTION INTRAVENOUS at 09:01

## 2024-01-26 RX ADMIN — POLYETHYLENE GLYCOL 3350 17 G: 17 POWDER, FOR SOLUTION ORAL at 08:57

## 2024-01-26 RX ADMIN — ATORVASTATIN CALCIUM 10 MG: 10 TABLET, FILM COATED ORAL at 20:26

## 2024-01-26 ASSESSMENT — PAIN SCALES - GENERAL
PAINLEVEL_OUTOF10: 0
PAINLEVEL_OUTOF10: 0
PAINLEVEL_OUTOF10: 6

## 2024-01-26 ASSESSMENT — PAIN DESCRIPTION - ORIENTATION: ORIENTATION: LEFT

## 2024-01-26 ASSESSMENT — PAIN DESCRIPTION - DESCRIPTORS: DESCRIPTORS: ACHING

## 2024-01-26 ASSESSMENT — PAIN DESCRIPTION - LOCATION: LOCATION: LEG

## 2024-01-26 NOTE — PROGRESS NOTES
ACUTE INPATIENT REHABILITATION ADMISSION  [facility]    Patient Name: Komal Crawford  MRN: [id number]   Date of Admit: 1/26/2024  Time of Arrival: 1715    Patient admitted to the Acute Inpatient Rehabilitation Unit via Wheelchair    Patient oriented to room, unit and fall prevention safety measures.  Education provided on the rehabilitation routine and therapy schedules.    Drug / Medication Regimen Review   Admitting medication orders compared with acute stay medications; home medication list reviewed with patient/family.      Medication Issues Identified ? Yes If Yes, Check All That Apply   []  Allergy to medication   []  Drug interactions (drug/drug, drug/food, drug/disease interactions)   []  Duplicate drug   []  Omission (drug missing from prescribed regimen)   []  Non adherence   []  Adverse reaction   []  Wrong patient, drug, dose, route, time error   []  Ineffective drug therapy       High-Risk Drug Classes: Use and Indication   Check if the patient is taking any medications by pharmacological classification If yes, check if there is an indication noted for all meds in the drug class   Antipsychotic No If yes: indication noted?  [] If no indication noted, follow up with provider for order clarification   Anticoagulant No If yes: indication noted?  []    Antibiotic No If yes: indication noted?  []    Opioid No If yes: indication noted?  []    Antiplatelet Yes If yes: indication noted?  [x]    Hypoglycemic (Including Insulin) Yes If yes: indication noted?  [x]      Attending Physical Medicine & Rehabilitation (PM&R) Admitting Order Review   Admission orders reviewed with Acute Inpatient Rehabilitation Attending PM&R Physician: Angela Sutton MD     Skin Assessment   Skin assessment performed by two nurses: all active alterations in skin integrity, wounds, lines, drains and airways assessed, measured, recorded and reconciled. Refer to LDA avatar and LDA flowsheet for additional information.    Nurse 1

## 2024-01-26 NOTE — CARE COORDINATION
ONGOING DISCHARGE PLAN:    Patient is alert and oriented x4.    Spoke with patient regarding discharge plan and patient confirms that plan is still ARU. Per [place] in ARU, pt can be accepted at 1630. Notified pt, primary care RN Lula, and clin lead Saad.  Pt's  at bedside and agreeable to plan.    Phone number for report [phone].    Will continue to follow for additional discharge needs.    If patient is discharged prior to next notation, then this note serves as note for discharge by case management.    Electronically signed by Belgica Rodney RN on 1/26/2024 at 12:41 PM

## 2024-01-26 NOTE — CONSULTS
0.1 cm 01/26/24 1402   Wound Surface Area (cm^2) 40 cm^2 01/26/24 1402   Wound Volume (cm^3) 4 cm^3 01/26/24 1402   Wound Assessment Purple/maroon;Denuded 01/26/24 1402   Drainage Amount None (dry) 01/26/24 1402   Odor None 01/26/24 1402   [place]-wound Assessment Blanchable erythema;Denuded 01/26/24 1402   Margins Attached edges 01/26/24 1402   Number of days: 1         WOUND ASSESSMENT:   [facility] nurse consult for buttocks wound.  Patient was admitted on 1/17 after she bent over to  her dog and her left leg gave out.  She had a left total hip arthroplasty on 1/20 with Dr. Bonner.  She was noted to have an unstable knee and had a total knee arthroplasty revision by Dr. Keyes on 1/23.  Upon assessment, patient has some denudation and and a small area of purple discoloration to her buttocks.  She also has some red irritated skin in her groin area.  She states that she uses miconazole powder at home and it is effective.  Secure message sent to Dr. Melendez and order obtained for miconazole powder.  Will apply Triad cream to buttocks.     Response to treatment:  Well tolerated by patient.       Plan:     Plan of Care:     Groin/abdominal fold: Cleanse with soap and water, pat dry.  Apply miconazole powder twice daily as ordered    Buttocks: Cleanse with soap and water, pat dry.  Apply Triad cream twice daily and as needed when incontinent    [x] Turn and reposition every 2 hours while in bed.    [x] Float heels off of bed with pillows under calves.    [] Heel protective boots (heel medix boots) at all times while in bed.    [] Sacral foam dressing to sacrococcygeal area. Use skin barrier film prior to placement. Peel back dressing, inspect skin beneath, and re-secure every shift. Change every 3 days and as needed if loose or soiled. Discontinue sacral foam if repeatedly soiled by incontinence.    [] Apply zinc oxide cream twice daily and as needed after incontinent episodes.    [] Perform routine incontinence care with use  of foam cleanser.    [x] Use single layer moisture wicking underpad.    [x] Use comfort glide system and wedges to reposition patient.    [x] Keep the head of the bed below 30 degrees unless contraindicated.    [] Pressure reducing chair cushion while up to chair. Reposition every hour while in chair and limit chair time to 2 hour intervals.    [x] Encourage good nutritional intake and fluids. Consult dietician if needed.    Specialty Bed Required : Yes   [] Low Air Loss   [x] Pressure Redistribution  [] Fluid Immersion  [] Bariatric  [] Total Pressure Relief  [] Other:     Current Diet: ADULT DIET; Regular; 4 carb choices (60 gm/meal)  Dietician consult:  Yes    Discharge Plan:  Placement for patient upon discharge: acute rehab   Patient appropriate for Outpatient Wound Care Center: N/A    Patient/Caregiver Teaching:  Level of patientunderstanding able to:     [x] Indicates understanding       [] Needs reinforcement  [] Unsuccessful      [x] Verbal Understanding  [] Demonstrated understanding       [] No evidence of learning  [] Refused teaching         [] N/A       Electronically signed by Anu Henderson RN on  1/26/2024 at 4:29 PM

## 2024-01-26 NOTE — CARE COORDINATION
ACUTE INPATIENT REHABILITATION  Financial Disclosure Statement: Eligibility and Benefit Verification    Patient Name: Komal Crawford MRN: [id number]     Disclosure Statement  [facility] Acute Inpatient Rehabilitation at [facility] provides 24 hour individualized service to patients with functional limitations due to, but not limited to stroke, brain injury, spinal cord injury, major multiple trauma, hip fracture, amputation, and neurological disorders.  Acute Inpatient Rehabilitation provides rehabilitative nursing, physician coverage, as well as physical therapy, occupational therapy, speech therapy, recreational therapy and other services as deemed necessary by our Board Certified Physical Medicine and Rehabilitation Physicians, Dr. Michael Harrell and Dr. Sakshi Johnson and Dr. Angela Sutton.    [facility] Acute Inpatient Rehabilitation at [facility] is fully accredited by the Commission on Accreditation of Rehabilitation Facilities (CARF) and The Joint Commission (TJC).    At a minimum, you will receive 5 days of therapy services totaling at least 15 hours per week. Your treatment program will consist of physical therapy at least 7.5 hours per week; occupational therapy 7.5 hours per week; and speech therapy 1.5 hours per week, as applicable.    Your estimated length of stay is currently:  Approximately 2 Weeks  Please Note: Estimated length of stay is based on individual condition and Acute Inpatient Rehabilitation specific needs. Length of stay may vary based upon interdisciplinary team assessment, insurance approval, and patient progression.    Your insurance coverage has been verified as follows:   Primary Insurance: Medicare   Deductible: $ 1632                       Coverage:  Per Medicare Benefit Period  Days 1-60:  $0 Co-Insurance  Days 61-90:  $400/day Co-Insurance  Days 91 and beyond:  $800/day Co-Insurance      Secondary Insurance: [facility] Cigna/Cigna O  Secondary insurance policies

## 2024-01-26 NOTE — CARE COORDINATION
ACUTE INPATIENT REHABILITATION  [facility]  PRE-ADMISSION ASSESSMENT    Patient Name: Komal Crawford        MRN: [id number]    : 1940  (83 y.o.)  Gender: female   Ethnicity: Not , /a or Lao Origin  Race: White    Admitted from:  [facility]     Type of Admission:  New Admission     Date of Onset / Admission to the Acute Hospital:  2024     Inpatient Rehabilitation Admitting Diagnosis:   Fall with left femoral neck impacted fracture status post left total hip arthroplasty  and left total knee revision     Did patient have surgery/procedures?  Yes, As Listed Below   2024: KNEE TOTAL ARTHROPLASTY REVISION WITH OSS  2024: Left total hip arthroplasty.  Physicians:   Lavern Rodgers MD  Physician  Internal Medicine    Kailey, Jon TA MD  Physician  Orthopedic Surgery    Damian Keyes MD  Physician  Orthopedic Surgery    Heydi Maya MD  Physician  Internal Medicine    Everett Shea MD  Physician  Internal Medicine    Mya Melendez MD  Physician  Internal Medicine      Risk for Clinical Complications:  Moderate     Co-morbidities:    Fall with right femoral neck impacted fracture status post right total hip arthroplasty  by Dr. Bonner-weightbearing as tolerated left lower extremity, posterior hip precautions  Left total knee revision due to periprosthetic fracture femur tip prosthesis 2024 by Dr. Keyes   coronary artery disease with stent 2022, hypertension- -, Norvasc, Lipitor, Imdur, Toprol, Ranexa, Plavix on hold, was on aspirin Plavix and statin prior-currently aspirin and Plavix on hold  Chronic thrombocytopenia-138  Chronic anemia with blood loss anemia as well-hemoglobin 8.1 status post transfusion from 5.8, hemoglobin dropped again to 6.4/6.7-plan for transfusion today  Diabetes glipizide on hold, Lantus  Chronic sciatic pain  Pain-Fentanyl, Norco, Dilaudid, Roxicodone, Zanaflex  Restless leg-Requip,  improvement: Approximately 2 Weeks  Please Note: Estimated length of stay is based on individual condition and Acute Inpatient Rehabilitation specific needs. Length of stay may vary based upon interdisciplinary team assessment, insurance approval, and patient progression.    Expected Post Discharge Treatments: Home with possible Home Care    Acute Inpatient Rehabilitation Disclosure Statement will be provided to patient upon admission to ARU with patient's verbalization of understanding.      I have reviewed and concur with the findings and results of the pre-admission screening assessment completed by the Inpatient Rehabilitation Admissions Coordinator.

## 2024-01-26 NOTE — DISCHARGE SUMMARY
Discharge Summary    Attending Physician: Jon Bonner MD  Admit Date: 1/17/2024  Discharge Date:    Primary Care Physician: Sabiha Bedolla MD    Admitting Diagnosis:  Principal Problem:    Left hip pain  Active Problems:    Fall    Age related osteoporosis    Hip pain, acute, left    Arthritis of left hip    Closed displaced fracture of left femoral neck (HCC)    History of total knee arthroplasty, left    Instability of left knee joint    Closed displaced fracture of right femoral neck (HCC)    Displaced supracondylar fracture without intracondylar extension of lower end of left femur, initial encounter for closed fracture (HCC)    Acute blood loss anemia    Periprosthetic fracture around internal prosthetic left knee joint  Resolved Problems:    * No resolved hospital problems. *        Discharge Diagnoses:  Principal Problem:    Left hip pain  Active Problems:    Fall    Age related osteoporosis    Hip pain, acute, left    Arthritis of left hip    Closed displaced fracture of left femoral neck (HCC)    History of total knee arthroplasty, left    Instability of left knee joint    Closed displaced fracture of right femoral neck (HCC)    Displaced supracondylar fracture without intracondylar extension of lower end of left femur, initial encounter for closed fracture (HCC)    Acute blood loss anemia    Periprosthetic fracture around internal prosthetic left knee joint  Resolved Problems:    * No resolved hospital problems. *         Past Medical History:   Diagnosis Date    Age-related cognitive decline     Ankle pain     Left ankle fracture in ortho boat.    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HCC) 1990    left with urinary incontinenc post surgical    Caffeine use     2 coffee/day, 1-2 tea per week    Cerebral artery occlusion with cerebral infarction (HCC)     Chronic back pain     Chronic ITP (idiopathic thrombocytopenia) (HCC)     CVA (cerebral infarction)  these medications      methylPREDNISolone 4 MG tablet  Commonly known as: MEDROL DOSEPACK     nitrofurantoin 100 MG capsule  Commonly known as: MACRODANTIN            ASK your doctor about these medications      vitamin B-12 1000 MCG tablet  Commonly known as: CYANOCOBALAMIN  Take 1 tablet by mouth once a week               Where to Get Your Medications        You can get these medications from any pharmacy    Bring a paper prescription for each of these medications  HYDROcodone-acetaminophen 5-325 MG per tablet  rivaroxaban 10 MG Tabs tablet          Discharge Condition  Stable       Activity on Discharge  As tolerated       Discharge Disposition:   rehab    Discharge Instructions  See Orders    Follow-Up Scheduled    Sabiha Bedolla MD  [id number] [address]  [facility] [place] [id number] [phone]    Follow up      Sabiha Bedolla MD  [address] [address]  [facility] [place] [id number] [phone]            Electronically signed by Jon Bonner MD on 1/26/2024 at 1:49 PM

## 2024-01-26 NOTE — PROGRESS NOTES
ACUTE INPATIENT REHABILITATION  [facility]    Case Management Assessment: IRF FRANKIE 4.0     Patient Health Questionnaire-9 (PHQ-2 to 9)   Over the last 2 weeks, how often have you been bothered by any of the following problems?    1. Little Interest or pleasure in doing things?   Never or 1 Day - Score 0    2. Feeling down, depressed or hopeless?   Never or 1 Day - Score 0    3. Trouble falling or staying asleep, or sleeping too much?   Never or 1 Day - Score 0    4. Feeling tired or having little energy?   Never or 1 Day - Score 0    5. Poor appetite or overeating?   Never or 1 Day - Score 0    6. Feeling bad about yourself-or that you are a failure or have let yourself or your family down?   Never or 1 Day - Score 0    7. Trouble concentrating on things, such as reading the newspaper or watching television?    Never or 1 Day - Score 0    8. Moving or speaking so slowly that other people could have noticed? Or the opposite-being so fidgety or restless that you have been moving around a lot more than usual?  Never or 1 Day - Score 0    9. Thoughts that you would be better off dead or of hurting yourself in some way?   Never or 1 Day - Score 0    Total Score: 0  If score is above 15, Notify PM&R Physician    If you checked off any problems, how difficult have these problems made it for you to do your work, take care of things at home, or get along with other people?   [x] Not difficult at all     [] Somewhat Difficult     [] Very Difficult     [] Extremely Difficult         Social Isolation     How often do you feel lonely or isolated from those around you?  Never       Access To Transportation     2.In the past six months to a year, has lack of transportation kept you from medical appointments or from getting your medications?”     No    3.In the past six months to a year, has lack of transportation kept you from non-medical meetings, appointments, work, or from getting things that you need?     Yes

## 2024-01-26 NOTE — PROGRESS NOTES
Post Operative Progress Note    1/26/2024 12:36 PM  Info:   Admit Date: 1/17/2024  PCP: Sabiha Bedolla MD      Medications:   Scheduled Meds:   polyethylene glycol  17 g Oral Daily    rivaroxaban  10 mg Oral Daily    sodium chloride flush  5-40 mL IntraVENous 2 times per day    acetaminophen  650 mg Oral Q6H    clopidogrel  75 mg Oral Daily    aspirin  81 mg Oral Daily    insulin glargine  14 Units SubCUTAneous Nightly    sodium chloride flush  5-40 mL IntraVENous 2 times per day    sodium chloride flush  5-40 mL IntraVENous 2 times per day    insulin lispro  0-8 Units SubCUTAneous TID WC    insulin lispro  0-4 Units SubCUTAneous Nightly    amLODIPine  2.5 mg Oral QAM    atorvastatin  10 mg Oral Nightly    pantoprazole  40 mg Oral QAM AC    isosorbide mononitrate  30 mg Oral QPM    metoprolol succinate  12.5 mg Oral QPM    primidone  50 mg Oral BID    ranolazine  500 mg Oral BID    rOPINIRole  4 mg Oral Nightly    glipiZIDE  5 mg Oral QAM AC     Continuous Infusions:   sodium chloride      lactated ringers IV soln 125 mL/hr at 01/25/24 0334    sodium chloride      sodium chloride      sodium chloride      sodium chloride      dextrose       PRN Meds:sodium chloride, sodium chloride flush, sodium chloride, HYDROcodone 5 mg - acetaminophen **OR** HYDROcodone 5 mg - acetaminophen, sodium chloride, sodium chloride flush, sodium chloride, sodium chloride flush, sodium chloride, potassium chloride **OR** potassium alternative oral replacement **OR** potassium chloride, magnesium sulfate, ondansetron **OR** ondansetron, acetaminophen, glucose, dextrose bolus **OR** dextrose bolus, glucagon (rDNA), dextrose, acetaminophen, bisacodyl, tiZANidine    Diet:   Diet: ADULT DIET; Regular; 4 carb choices (60 gm/meal)    Subjective:   Systemic or Specific Complaints: Left knee and hip pain significantly improved    Objective:     Patient Vitals for the past 24 hrs:   BP Temp Temp src Pulse Resp SpO2   01/26/24 0628 (!) 135/59

## 2024-01-26 NOTE — PLAN OF CARE

## 2024-01-26 NOTE — PROGRESS NOTES
ACUTE INPATIENT REHABILITATION ADMISSION  [facility]    Patient Name: Komal Crawford  MRN: [id number]   Date of Admit: 1/26/2024  Time of Arrival: ***    Patient admitted to the Acute Inpatient Rehabilitation Unit via {Blank single:50873::\"Ambulatory\",\"Wheelchair\",\"Bed\",\"Stretcher\"}    Patient oriented to room, unit and fall prevention safety measures.  Education provided on the rehabilitation routine and therapy schedules.    Drug / Medication Regimen Review   Admitting medication orders compared with acute stay medications; home medication list reviewed with patient/family.      Medication Issues Identified ? {YES/NO:19726} If Yes, Check All That Apply   []  Allergy to medication   []  Drug interactions (drug/drug, drug/food, drug/disease interactions)   []  Duplicate drug   []  Omission (drug missing from prescribed regimen)   []  Non adherence   []  Adverse reaction   []  Wrong patient, drug, dose, route, time error   []  Ineffective drug therapy       High-Risk Drug Classes: Use and Indication   Check if the patient is taking any medications by pharmacological classification If yes, check if there is an indication noted for all meds in the drug class   Antipsychotic {YES/NO:19726} If yes: indication noted?  [] If no indication noted, follow up with provider for order clarification   Anticoagulant {YES/NO:19726} If yes: indication noted?  []    Antibiotic {YES/NO:19726} If yes: indication noted?  []    Opioid {YES/NO:19726} If yes: indication noted?  []    Antiplatelet {YES/NO:19726} If yes: indication noted?  []    Hypoglycemic (Including Insulin) {YES/NO:19726} If yes: indication noted?  []      Attending Physical Medicine & Rehabilitation (PM&R) Admitting Order Review   Admission orders reviewed with Acute Inpatient Rehabilitation Attending PM&R Physician: {ambiguous abbreviation:[id number]::\"Angela Sutton MD\",\"Sakshi Johnson MD\",\"Michael Harrell MD\",\"Isabela Varela MD\"}     Skin Assessment

## 2024-01-26 NOTE — DISCHARGE SUMMARY
[facility] Internal Medicine  Skinny Anders MD; Rosendo Tena MD; Sohan Aguilar MD; MD Heydi Scott MD; Lavern Rodgers MD      [facility] Internal Medicine  IN-PATIENT SERVICE   [facility]    Discharge Summary     Patient ID: Komal Crawford  :  1940   MRN: [id number]     ACCOUNT:  [id number]   Patient's PCP: Sabiha Bedolla MD  Admit Date: 2024   Discharge Date: 2024     Length of Stay: 7  Code Status:  Full Code  Admitting Physician: Jon Bonner MD  Discharge Physician: Lavern Rodgers MD     Active Discharge Diagnoses:     Hospital Problem Lists:  Principal Problem:    Left hip pain  Active Problems:    Fall    Age related osteoporosis    Hip pain, acute, left    Arthritis of left hip    Closed displaced fracture of left femoral neck (HCC)    History of total knee arthroplasty, left    Instability of left knee joint    Closed displaced fracture of right femoral neck (HCC)    Displaced supracondylar fracture without intracondylar extension of lower end of left femur, initial encounter for closed fracture (HCC)    Acute blood loss anemia    Periprosthetic fracture around internal prosthetic left knee joint  Resolved Problems:    * No resolved hospital problems. *      Admission Condition:  Serious      Discharged Condition: Stable     Hospital Stay:     Hospital Course:  Komal Crawford is a 83 y.o. female who was admitted for the management of   Left hip pain , presented to ER with Fall and Leg Injury (L leg)  83-year-old female recently discharged from ARU, presenting with fall left hip pain chronic sciatica, x-ray shows no fracture severe osteoarthritis left hip  History of T2DM HTN CAD status post stent , chronic thrombocytopenia chronic anemia, recurrent UTI neurogenic bladder straight cath at home  Orthopedics consulted due for left hip MRI and lumbar MRI done, impacted fracture left femoral neck, status post repair on January    rOPINIRole 4 MG tablet  Commonly known as: REQUIP  Take 1 tablet by mouth nightly     tiZANidine 2 MG tablet  Commonly known as: ZANAFLEX  Take 1 tablet by mouth nightly as needed (for pain)            STOP taking these medications      methylPREDNISolone 4 MG tablet  Commonly known as: MEDROL DOSEPACK     nitrofurantoin 100 MG capsule  Commonly known as: MACRODANTIN            ASK your doctor about these medications      vitamin B-12 1000 MCG tablet  Commonly known as: CYANOCOBALAMIN  Take 1 tablet by mouth once a week               Where to Get Your Medications        You can get these medications from any pharmacy    Bring a paper prescription for each of these medications  HYDROcodone-acetaminophen 5-325 MG per tablet  rivaroxaban 10 MG Tabs tablet         Discharge Procedure Orders   Initiate PAT Protocol   Standing Status: Future Standing Exp. Date: 03/23/24       Time Spent on discharge is  36 mins in patient examination, evaluation, counseling as well as medication reconciliation, prescriptions for required medications, discharge plan and follow up.    Electronically signed by   Lavern Rodgers MD  1/26/2024  10:48 AM      Thank you Sabiha Christina MD for the opportunity to be involved in this patient's care.    Please note that this chart was generated using voice recognition Dragon dictation software.  Although every effort was made to ensure the accuracy of this automated transcription, some errors in transcription may have occurred.

## 2024-01-27 PROBLEM — N39.0 UTI (URINARY TRACT INFECTION): Status: RESOLVED | Noted: 2023-12-28 | Resolved: 2024-01-27

## 2024-01-27 PROBLEM — S72.002D CLOSED FRACTURE OF LEFT HIP WITH ROUTINE HEALING: Status: ACTIVE | Noted: 2024-01-26

## 2024-01-27 LAB
ABO/RH: NORMAL
ALBUMIN SERPL-MCNC: 2.4 G/DL (ref 3.5–5.2)
ALP SERPL-CCNC: 73 U/L (ref 35–104)
ALT SERPL-CCNC: 21 U/L (ref 5–33)
ANION GAP SERPL CALCULATED.3IONS-SCNC: 12 MMOL/L (ref 9–17)
ANTIBODY SCREEN: NEGATIVE
ARM BAND NUMBER: NORMAL
AST SERPL-CCNC: 23 U/L
BASOPHILS # BLD: 0 K/UL (ref 0–0.2)
BASOPHILS NFR BLD: 0 % (ref 0–2)
BILIRUB DIRECT SERPL-MCNC: 0.1 MG/DL
BILIRUB INDIRECT SERPL-MCNC: 0.7 MG/DL (ref 0–1)
BILIRUB SERPL-MCNC: 0.8 MG/DL (ref 0.3–1.2)
BLOOD BANK BLOOD PRODUCT EXPIRATION DATE: NORMAL
BLOOD BANK DISPENSE STATUS: NORMAL
BLOOD BANK ISBT PRODUCT BLOOD TYPE: 5100
BLOOD BANK PRODUCT CODE: NORMAL
BLOOD BANK SAMPLE EXPIRATION: NORMAL
BLOOD BANK UNIT TYPE AND RH: NORMAL
BPU ID: NORMAL
BUN SERPL-MCNC: 14 MG/DL (ref 8–23)
CALCIUM SERPL-MCNC: 8.1 MG/DL (ref 8.6–10.4)
CHLORIDE SERPL-SCNC: 100 MMOL/L (ref 98–107)
CO2 SERPL-SCNC: 21 MMOL/L (ref 20–31)
COMPONENT: NORMAL
CREAT SERPL-MCNC: 0.6 MG/DL (ref 0.5–0.9)
CROSSMATCH RESULT: NORMAL
EOSINOPHIL # BLD: 0.2 K/UL (ref 0–0.4)
EOSINOPHILS RELATIVE PERCENT: 2 % (ref 0–4)
ERYTHROCYTE [DISTWIDTH] IN BLOOD BY AUTOMATED COUNT: 15.9 % (ref 11.5–14.9)
GFR SERPL CREATININE-BSD FRML MDRD: >60 ML/MIN/1.73M2
GLUCOSE BLD-MCNC: 127 MG/DL (ref 65–105)
GLUCOSE BLD-MCNC: 144 MG/DL (ref 65–105)
GLUCOSE BLD-MCNC: 152 MG/DL (ref 65–105)
GLUCOSE BLD-MCNC: 155 MG/DL (ref 65–105)
GLUCOSE SERPL-MCNC: 130 MG/DL (ref 70–99)
HCT VFR BLD AUTO: 25.7 % (ref 36–46)
HGB BLD-MCNC: 8.5 G/DL (ref 12–16)
LYMPHOCYTES NFR BLD: 1.1 K/UL (ref 1–4.8)
LYMPHOCYTES RELATIVE PERCENT: 10 % (ref 24–44)
MCH RBC QN AUTO: 29.2 PG (ref 26–34)
MCHC RBC AUTO-ENTMCNC: 33.2 G/DL (ref 31–37)
MCV RBC AUTO: 88 FL (ref 80–100)
MONOCYTES NFR BLD: 0.5 K/UL (ref 0.1–1.3)
MONOCYTES NFR BLD: 5 % (ref 1–7)
NEUTROPHILS NFR BLD: 83 % (ref 36–66)
NEUTS SEG NFR BLD: 8.7 K/UL (ref 1.3–9.1)
PLATELET # BLD AUTO: 156 K/UL (ref 150–450)
PMV BLD AUTO: 12.2 FL (ref 6–12)
POTASSIUM SERPL-SCNC: 4.4 MMOL/L (ref 3.7–5.3)
PROT SERPL-MCNC: 5.1 G/DL (ref 6.4–8.3)
RBC # BLD AUTO: 2.92 M/UL (ref 4–5.2)
SODIUM SERPL-SCNC: 133 MMOL/L (ref 135–144)
TRANSFUSION STATUS: NORMAL
UNIT DIVISION: 0
UNIT ISSUE DATE/TIME: NORMAL
WBC OTHER # BLD: 10.5 K/UL (ref 3.5–11)

## 2024-01-27 PROCEDURE — 85025 COMPLETE CBC W/AUTO DIFF WBC: CPT

## 2024-01-27 PROCEDURE — 99222 1ST HOSP IP/OBS MODERATE 55: CPT | Performed by: INTERNAL MEDICINE

## 2024-01-27 PROCEDURE — 80076 HEPATIC FUNCTION PANEL: CPT

## 2024-01-27 PROCEDURE — 6370000000 HC RX 637 (ALT 250 FOR IP): Performed by: INTERNAL MEDICINE

## 2024-01-27 PROCEDURE — 99222 1ST HOSP IP/OBS MODERATE 55: CPT | Performed by: STUDENT IN AN ORGANIZED HEALTH CARE EDUCATION/TRAINING PROGRAM

## 2024-01-27 PROCEDURE — 36415 COLL VENOUS BLD VENIPUNCTURE: CPT

## 2024-01-27 PROCEDURE — 1180000000 HC REHAB R&B

## 2024-01-27 PROCEDURE — 97116 GAIT TRAINING THERAPY: CPT

## 2024-01-27 PROCEDURE — 80048 BASIC METABOLIC PNL TOTAL CA: CPT

## 2024-01-27 PROCEDURE — 51702 INSERT TEMP BLADDER CATH: CPT

## 2024-01-27 PROCEDURE — 97167 OT EVAL HIGH COMPLEX 60 MIN: CPT

## 2024-01-27 PROCEDURE — 51798 US URINE CAPACITY MEASURE: CPT

## 2024-01-27 PROCEDURE — 97535 SELF CARE MNGMENT TRAINING: CPT

## 2024-01-27 PROCEDURE — 97162 PT EVAL MOD COMPLEX 30 MIN: CPT

## 2024-01-27 PROCEDURE — 51701 INSERT BLADDER CATHETER: CPT

## 2024-01-27 PROCEDURE — 97110 THERAPEUTIC EXERCISES: CPT

## 2024-01-27 RX ORDER — NITROFURANTOIN 25; 75 MG/1; MG/1
100 CAPSULE ORAL EVERY 12 HOURS SCHEDULED
Status: COMPLETED | OUTPATIENT
Start: 2024-01-27 | End: 2024-02-03

## 2024-01-27 RX ADMIN — ACETAMINOPHEN 650 MG: 325 TABLET, FILM COATED ORAL at 23:42

## 2024-01-27 RX ADMIN — INSULIN GLARGINE 14 UNITS: 100 INJECTION, SOLUTION SUBCUTANEOUS at 20:20

## 2024-01-27 RX ADMIN — ISOSORBIDE MONONITRATE 30 MG: 30 TABLET, EXTENDED RELEASE ORAL at 20:21

## 2024-01-27 RX ADMIN — PANTOPRAZOLE SODIUM 40 MG: 40 TABLET, DELAYED RELEASE ORAL at 06:00

## 2024-01-27 RX ADMIN — POLYETHYLENE GLYCOL 3350 17 G: 17 POWDER, FOR SOLUTION ORAL at 07:49

## 2024-01-27 RX ADMIN — ROPINIROLE HYDROCHLORIDE 4 MG: 1 TABLET, FILM COATED ORAL at 20:21

## 2024-01-27 RX ADMIN — ATORVASTATIN CALCIUM 10 MG: 10 TABLET, FILM COATED ORAL at 20:22

## 2024-01-27 RX ADMIN — HYDROCODONE BITARTRATE AND ACETAMINOPHEN 2 TABLET: 5; 325 TABLET ORAL at 07:48

## 2024-01-27 RX ADMIN — ACETAMINOPHEN 650 MG: 325 TABLET, FILM COATED ORAL at 17:05

## 2024-01-27 RX ADMIN — RANOLAZINE 500 MG: 500 TABLET, EXTENDED RELEASE ORAL at 07:50

## 2024-01-27 RX ADMIN — NITROFURANTOIN MONOHYDRATE/MACROCRYSTALS 100 MG: 75; 25 CAPSULE ORAL at 20:29

## 2024-01-27 RX ADMIN — AMLODIPINE BESYLATE 2.5 MG: 2.5 TABLET ORAL at 07:49

## 2024-01-27 RX ADMIN — HYDROCODONE BITARTRATE AND ACETAMINOPHEN 1 TABLET: 5; 325 TABLET ORAL at 02:37

## 2024-01-27 RX ADMIN — ACETAMINOPHEN 650 MG: 325 TABLET, FILM COATED ORAL at 12:04

## 2024-01-27 RX ADMIN — ACETAMINOPHEN 650 MG: 325 TABLET, FILM COATED ORAL at 06:00

## 2024-01-27 RX ADMIN — PRIMIDONE 50 MG: 50 TABLET ORAL at 20:24

## 2024-01-27 RX ADMIN — RANOLAZINE 500 MG: 500 TABLET, EXTENDED RELEASE ORAL at 20:24

## 2024-01-27 RX ADMIN — PRIMIDONE 50 MG: 50 TABLET ORAL at 07:51

## 2024-01-27 RX ADMIN — RIVAROXABAN 10 MG: 10 TABLET, FILM COATED ORAL at 20:22

## 2024-01-27 RX ADMIN — METOPROLOL SUCCINATE 12.5 MG: 25 TABLET, EXTENDED RELEASE ORAL at 20:22

## 2024-01-27 RX ADMIN — GLIPIZIDE 5 MG: 10 TABLET ORAL at 06:01

## 2024-01-27 RX ADMIN — CLOPIDOGREL BISULFATE 75 MG: 75 TABLET ORAL at 07:49

## 2024-01-27 ASSESSMENT — PAIN DESCRIPTION - ORIENTATION
ORIENTATION: LEFT
ORIENTATION: LEFT
ORIENTATION: LEFT;RIGHT

## 2024-01-27 ASSESSMENT — PAIN DESCRIPTION - DESCRIPTORS
DESCRIPTORS: ACHING

## 2024-01-27 ASSESSMENT — PAIN DESCRIPTION - LOCATION
LOCATION: ANKLE
LOCATION: KNEE
LOCATION: LEG

## 2024-01-27 ASSESSMENT — PAIN SCALES - GENERAL
PAINLEVEL_OUTOF10: 2
PAINLEVEL_OUTOF10: 2

## 2024-01-27 NOTE — PLAN OF CARE
Problem: Discharge Planning  Goal: Discharge to home or other facility with appropriate resources  Outcome: Progressing  Flowsheets (Taken 1/26/2024 1803 by Merle Hernández LPN)  Discharge to home or other facility with appropriate resources: Identify barriers to discharge with patient and caregiver     Problem: Safety - Adult  Goal: Free from fall injury  Outcome: Progressing     Problem: ABCDS Injury Assessment  Goal: Absence of physical injury  Outcome: Progressing  Flowsheets  Taken 1/26/2024 1757 by Merle Hernández LPN  Absence of Physical Injury: Implement safety measures based on patient assessment  Taken 1/26/2024 1754 by Merle Hernández LPN  Absence of Physical Injury: Implement safety measures based on patient assessment     Problem: Skin/Tissue Integrity  Goal: Absence of new skin breakdown  Description: 1.  Monitor for areas of redness and/or skin breakdown  2.  Assess vascular access sites hourly  3.  Every 4-6 hours minimum:  Change oxygen saturation probe site  4.  Every 4-6 hours:  If on nasal continuous positive airway pressure, respiratory therapy assess nares and determine need for appliance change or resting period.  Outcome: Progressing

## 2024-01-27 NOTE — PROGRESS NOTES
[facility]    Date: 2024  Patient Name: Komal Crawford        : 1940       Occupational therapy includes sexuality, sexual activity, and relationship roles into activities of daily living.     The following questions were included in the prior level of function section of the Occupational therapy evaluation to address intimacy and relationship roles:    Is it important is it for you to participate in intimacy? No    Are you distressed or concerned about your future intimacy/ relationship roles with your partner? No    Are you comfortable discussing your intimacy concerns with your partner? N/A    Would you like to address intimacy and relationship roles in your plan of care? No     Cailin Irby, OT,   Date: 2024

## 2024-01-27 NOTE — PLAN OF CARE
Problem: Discharge Planning  Goal: Discharge to home or other facility with appropriate resources  1/27/2024 1344 by Josephine Yusuf II, LPN  Outcome: Progressing  Flowsheets (Taken 1/26/2024 1803 by Merle Hernández LPN)  Discharge to home or other facility with appropriate resources: Identify barriers to discharge with patient and caregiver     Problem: Safety - Adult  Goal: Free from fall injury  1/27/2024 1344 by Josephine Yusuf II, LPN  Outcome: Progressing  Flowsheets (Taken 1/27/2024 1344)  Free From Fall Injury:   Instruct family/caregiver on patient safety   Based on caregiver fall risk screen, instruct family/caregiver to ask for assistance with transferring infant if caregiver noted to have fall risk factors     Problem: ABCDS Injury Assessment  Goal: Absence of physical injury  1/27/2024 1344 by Josephine Yusuf II, LPN  Outcome: Progressing  Flowsheets (Taken 1/26/2024 1757 by Merle Hernández LPN)  Absence of Physical Injury: Implement safety measures based on patient assessment     Problem: Skin/Tissue Integrity  Goal: Absence of new skin breakdown  Description: 1.  Monitor for areas of redness and/or skin breakdown  2.  Assess vascular access sites hourly  3.  Every 4-6 hours minimum:  Change oxygen saturation probe site  4.  Every 4-6 hours:  If on nasal continuous positive airway pressure, respiratory therapy assess nares and determine need for appliance change or resting period.  1/27/2024 1344 by Josephine Yusuf II, LPN  Outcome: Progressing

## 2024-01-27 NOTE — PROGRESS NOTES
Physical Therapy  Facility/Department: [facility] ACUTE REHAB  Rehabilitation Physical Therapy Progress Note.    NAME: Komal Crawford  : 1940 (83 y.o.)  MRN: [id number]  CODE STATUS: Full Code    Date of Service: 24      Past Medical History:   Diagnosis Date    Age-related cognitive decline     Ankle pain     Left ankle fracture in ortho boat.    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    Caffeine use     2 coffee/day, 1-2 tea per week    Cerebral artery occlusion with cerebral infarction (HCC)     Chronic back pain     Chronic ITP (idiopathic thrombocytopenia) (HCC)     CVA (cerebral infarction) ,     balance issues, short term memory loss    Diabetic gastroparesis (HCC)     Diverticular disease     GERD (gastroesophageal reflux disease)     Hiatal hernia     Hip fracture (HCC)     History of blood transfusion     History of decreased platelet count     Hyperlipemia     Hypertension     Internal hemorrhoid     Macular degeneration of left eye     S/P laser treatment    Nausea and vomiting     Neuropathy     Osteoarthritis     Ringing in ears     Self-catheterizes urinary bladder     6-7 times daily    TIA (transient ischemic attack) 2000    x1    Tubular adenoma     colon polyps    Type II or unspecified type diabetes mellitus without mention of complication, not stated as uncontrolled     Urinary incontinence     frequent UTI s/p infection, surgical dilatation lead to incontinence    Wears dentures     full on top, partial on bottom    Wears glasses      Past Surgical History:   Procedure Laterality Date    APPENDECTOMY      BLADDER SURGERY      bladder stimulator    BLADDER SUSPENSION      multiple    CARDIAC CATHETERIZATION  2008    no stents    CARDIOVASCULAR STRESS TEST  2014    CATARACT REMOVAL WITH IMPLANT Left 2017    Raffoul/Sulaiman    CATARACT REMOVAL WITH IMPLANT  Time   Individual Concurrent Group Co-treatment   Time In 1438         Time Out 1516         Minutes 38                   Erica Diop, AARON, 01/27/24 at 3:36 PM

## 2024-01-27 NOTE — PROGRESS NOTES
16fr. Winters catheter inserted with 10ml to inflate balloon. Urine return noted jessica in color with no odor noted. Pt tolerated well.

## 2024-01-27 NOTE — H&P
gastroparesis (HCC)     Diverticular disease 1986    GERD (gastroesophageal reflux disease)     Hiatal hernia     Hip fracture (HCC)     History of blood transfusion     History of decreased platelet count     Hyperlipemia     Hypertension     Internal hemorrhoid     Macular degeneration of left eye 1980's    S/P laser treatment    Nausea and vomiting     Neuropathy     Osteoarthritis     Ringing in ears     Self-catheterizes urinary bladder     6-7 times daily    TIA (transient ischemic attack) 2000    x1    Tubular adenoma     colon polyps    Type II or unspecified type diabetes mellitus without mention of complication, not stated as uncontrolled     Urinary incontinence     frequent UTI s/p infection, surgical dilatation lead to incontinence    Wears dentures     full on top, partial on bottom    Wears glasses        Past Surgical History:      Procedure Laterality Date    APPENDECTOMY  1962    BLADDER SURGERY      bladder stimulator    BLADDER SUSPENSION  2002    multiple    CARDIAC CATHETERIZATION  2008    no stents    CARDIOVASCULAR STRESS TEST  12/2014    CATARACT REMOVAL WITH IMPLANT Left 11/07/2017    Tyree/Sulaiman    CATARACT REMOVAL WITH IMPLANT Right 11/28/2017    Tyree/Sulaiman    COLON SURGERY      colostomy reversal    COLONOSCOPY  04/07/2016    tubular adenoma x3; internal hemorrhoids    COLONOSCOPY N/A 11/06/2019    COLONOSCOPY DIAGNOSTIC performed by Eli Marinelli MD at [facility] ENDO    COLONOSCOPY  11/06/2019    COLOSTOMY  1986    bowel obstruction/ rupture from diverticular disease, reversal 3 mos later    CORONARY ANGIOPLASTY WITH STENT PLACEMENT  08/04/2022    CYSTOSCOPY  09/08/2017    W/ 200IU Botox     FRACTURE SURGERY Right 2011    hip    FRACTURE SURGERY Left 2001    WRIST    FRACTURE SURGERY Left 2013    ankle    HERNIA REPAIR      HIP SURGERY Right 11/6/2023    HIP HARDWARE REMOVAL - WITH DEEP HARDWARE REMOVAL 7.3 NABILA SCREW   X3 performed by Jon Bonner MD at [facility] OR     WBAT to the left lower limb, posterior hip precautions.  Pain control with scheduled tylenol, as-needed tizanidine, as-needed norco.  Left distal femur fracture:  S/p total knee arthroplasty revision on 1/23/24 (Dr. Keyes).  WBAT to the left lower limb.  Pain control as above.  Acute blood loss anemia:  Hemoglobin 8.5 on 1/27, stable.  Will monitor.  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  Has been getting intermittent catheterization during admission.  Will place reeves catheter temporarily, as cathing is painful for her at this time.  Will plan for removal of reeves catheter prior to discharge.  Chronic constipation/neurogenic bowel:  Last bowel movement on 1/16/24, per chart.  Miralax daily, senokot prn, dulcolax prn - will plan to give suppository today.  History of CVA:  On plavix, atorvastatin  CAD s/p stenting:  On plavix, atorvastatin.  Also on renexa, imdur.  HTN:  On amlodipine, imdur, metoprolol  HLD:  On atorvastatin  Type 2 diabetes:  On glipizide, lantus nightly, humalog sliding scale  Chronic ITP:  Platelets 156 on 1/27, improved.  Will monitor.  GERD:  On protonix  Anxiety  Obesity:  BMI 36.47.  Dietician to follow  DVT Prophylaxis:   Xarelto  Internal Medicine for medical management  Follow up PCP 1-2 weeks, Orthopedic Surgery,       Estimated Length of Stay:  2 weeks - has two fractures in the left lower limb requiring two surgical procedures, needing significant assistance at this time    Prognosis  Fair - has multiple co-morbidities, is requiring significant assistance at this time    Goals  Home at [facility]/Minimal Assist  Supervision at Discharge: Intermittent      Angela Sutton MD

## 2024-01-27 NOTE — PROGRESS NOTES
Physical Therapy  Facility/Department: [facility] ACUTE REHAB  Rehabilitation Physical Therapy Initial Assessment    NAME: Komal Crawford  : 1940 (83 y.o.)  MRN: [id number]  CODE STATUS: Full Code    Date of Service: 24      Past Medical History:   Diagnosis Date    Age-related cognitive decline     Ankle pain     Left ankle fracture in ortho boat.    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    Caffeine use     2 coffee/day, 1-2 tea per week    Cerebral artery occlusion with cerebral infarction (HCC)     Chronic back pain     Chronic ITP (idiopathic thrombocytopenia) (HCC)     CVA (cerebral infarction) ,     balance issues, short term memory loss    Diabetic gastroparesis (HCC)     Diverticular disease     GERD (gastroesophageal reflux disease)     Hiatal hernia     Hip fracture (HCC)     History of blood transfusion     History of decreased platelet count     Hyperlipemia     Hypertension     Internal hemorrhoid     Macular degeneration of left eye     S/P laser treatment    Nausea and vomiting     Neuropathy     Osteoarthritis     Ringing in ears     Self-catheterizes urinary bladder     6-7 times daily    TIA (transient ischemic attack) 2000    x1    Tubular adenoma     colon polyps    Type II or unspecified type diabetes mellitus without mention of complication, not stated as uncontrolled     Urinary incontinence     frequent UTI s/p infection, surgical dilatation lead to incontinence    Wears dentures     full on top, partial on bottom    Wears glasses      Past Surgical History:   Procedure Laterality Date    APPENDECTOMY      BLADDER SURGERY      bladder stimulator    BLADDER SUSPENSION      multiple    CARDIAC CATHETERIZATION  2008    no stents    CARDIOVASCULAR STRESS TEST  2014    CATARACT REMOVAL WITH IMPLANT Left 2017    Raffoalhaji/Sulaiman    CATARACT REMOVAL WITH

## 2024-01-27 NOTE — PROGRESS NOTES
[facility]   Acute Rehabilitation Occupational Therapy Evaluation     Date: 24  Patient Name: Komal Crawford       Room: 2608/2608-01  MRN: [id number]  Account: [id number]   : 1940  (83 y.o.) Gender: female          Diagnosis: Admitted with left femoral neck and shane-prosthetic femur fracture post fall,  L THR 24.   left hip fracture s/p total hip arthroplasty 24. , left distal femur fracture s/p total knee arthroplasty revision 24.  Additional Pertinent Hx: Expand All Collapse All    Physical Medicine & Rehabilitation History and Physical  [facility] Acute Rehabilitation Unit      Primary care provider:  Sabiha Bedolla MD      Chief Complaint and Reason for Rehabilitation Admission:   ADL and mobility deficits secondary to left hip fracture s/p total hip arthroplasty, left distal femur fracture s/p total knee arthroplasty revision     History of Present Illness:  Komal Crawford is a 83 y.o. 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  She was originally admitted to [facility] on 24.     She initially presented after a mechanical fall when attempting to  her dog and her left leg gave out.  Denied hitting her head.  Initial x-rays did not show any pelvic or hip fractures but did show moderate to severe osteoarthritis of the left hip.  MRI left hip showed suspected posttraumatic marrow edema and probably impaction fracture at the superolateral left femoral head.  She was also found to have comminuted impacted fracture of the left distal femur.  She underwent left total hip arthroplasty on 24 (Dr. Bonner) and left total knee arthroplasty revision on 24 (Dr. Keyes).  She is WBAT to the left lower limb with posterior

## 2024-01-27 NOTE — PROGRESS NOTES
Comprehensive Nutrition Assessment    Type and Reason for Visit:  Consult    Nutrition Recommendations/Plan:   Continue current diet  Start ONS     Malnutrition Assessment:  Malnutrition Status:  At risk for malnutrition (Comment) (01/27/24 1232)    Context:  Acute Illness     Findings of the 6 clinical characteristics of malnutrition:  Energy Intake:  No significant decrease in energy intake  Weight Loss:  No significant weight loss     Body Fat Loss:  Unable to assess     Muscle Mass Loss:  Unable to assess    Fluid Accumulation:  Mild Extremities   Strength:  Not Performed    Nutrition Assessment:    Pt admitted to ARU post closed right hip fx. Hx of T2DM, glucose on 1/24 was 447, now WNL. Pt with wounds to the coccyx and buttocks, will order Glucerna to better meet protein needs.    Nutrition Related Findings:    Edema: RLE +1 pitting, LLE +2 pitting. Glucose 130, down from 447 on 1/24. Wound Type: Pressure Injury       Current Nutrition Intake & Therapies:    Average Meal Intake: %     ADULT DIET; Regular; 4 carb choices (60 gm/meal)  ADULT ORAL NUTRITION SUPPLEMENT; Breakfast, Lunch, Dinner; Diabetic Oral Supplement    Anthropometric Measures:  Height: 144 cm (4' 8.69\")  Ideal Body Weight (IBW): 83 lbs (38 kg)    Admission Body Weight: 75.6 kg (166 lb 10.7 oz)  Current Body Weight: 75.6 kg (166 lb 10.7 oz), 200.8 % IBW. Weight Source: Bed Scale  Current BMI (kg/m2): 36.5    BMI Categories: Underweight (BMI less than 18.5)    Estimated Daily Nutrient Needs:  Energy Requirements Based On: Kcal/kg  Weight Used for Energy Requirements: Admission  Energy (kcal/day): 1370  Weight Used for Protein Requirements: Ideal  Protein (g/day): 76 based on 2g/kg IBW  Method Used for Fluid Requirements: 1 ml/kcal  Fluid (ml/day): 1370    Nutrition Diagnosis:   Inadequate protein intake     Nutrition Interventions:   Food and/or Nutrient Delivery: Continue Current Diet, Start Oral Nutrition Supplement  Nutrition

## 2024-01-28 LAB — GLUCOSE BLD-MCNC: 169 MG/DL (ref 65–105)

## 2024-01-28 PROCEDURE — 97110 THERAPEUTIC EXERCISES: CPT

## 2024-01-28 PROCEDURE — 99232 SBSQ HOSP IP/OBS MODERATE 35: CPT | Performed by: INTERNAL MEDICINE

## 2024-01-28 PROCEDURE — 51702 INSERT TEMP BLADDER CATH: CPT

## 2024-01-28 PROCEDURE — 6370000000 HC RX 637 (ALT 250 FOR IP): Performed by: PHYSICAL MEDICINE & REHABILITATION

## 2024-01-28 PROCEDURE — 99232 SBSQ HOSP IP/OBS MODERATE 35: CPT | Performed by: STUDENT IN AN ORGANIZED HEALTH CARE EDUCATION/TRAINING PROGRAM

## 2024-01-28 PROCEDURE — 97535 SELF CARE MNGMENT TRAINING: CPT

## 2024-01-28 PROCEDURE — 82947 ASSAY GLUCOSE BLOOD QUANT: CPT

## 2024-01-28 PROCEDURE — 97530 THERAPEUTIC ACTIVITIES: CPT

## 2024-01-28 PROCEDURE — 1180000000 HC REHAB R&B

## 2024-01-28 PROCEDURE — 6370000000 HC RX 637 (ALT 250 FOR IP): Performed by: INTERNAL MEDICINE

## 2024-01-28 RX ADMIN — HYDROCODONE BITARTRATE AND ACETAMINOPHEN 1 TABLET: 5; 325 TABLET ORAL at 08:58

## 2024-01-28 RX ADMIN — AMLODIPINE BESYLATE 2.5 MG: 2.5 TABLET ORAL at 09:21

## 2024-01-28 RX ADMIN — NITROFURANTOIN MONOHYDRATE/MACROCRYSTALS 100 MG: 75; 25 CAPSULE ORAL at 09:20

## 2024-01-28 RX ADMIN — HYDROCODONE BITARTRATE AND ACETAMINOPHEN 1 TABLET: 5; 325 TABLET ORAL at 20:59

## 2024-01-28 RX ADMIN — NITROFURANTOIN MONOHYDRATE/MACROCRYSTALS 100 MG: 75; 25 CAPSULE ORAL at 21:01

## 2024-01-28 RX ADMIN — INSULIN GLARGINE 14 UNITS: 100 INJECTION, SOLUTION SUBCUTANEOUS at 21:01

## 2024-01-28 RX ADMIN — SENNOSIDES 17.2 MG: 8.6 TABLET, FILM COATED ORAL at 11:45

## 2024-01-28 RX ADMIN — PRIMIDONE 50 MG: 50 TABLET ORAL at 21:01

## 2024-01-28 RX ADMIN — RIVAROXABAN 10 MG: 10 TABLET, FILM COATED ORAL at 20:59

## 2024-01-28 RX ADMIN — ACETAMINOPHEN 650 MG: 325 TABLET, FILM COATED ORAL at 11:45

## 2024-01-28 RX ADMIN — GLIPIZIDE 5 MG: 10 TABLET ORAL at 06:07

## 2024-01-28 RX ADMIN — RANOLAZINE 500 MG: 500 TABLET, EXTENDED RELEASE ORAL at 09:20

## 2024-01-28 RX ADMIN — PRIMIDONE 50 MG: 50 TABLET ORAL at 09:20

## 2024-01-28 RX ADMIN — ROPINIROLE HYDROCHLORIDE 4 MG: 1 TABLET, FILM COATED ORAL at 20:59

## 2024-01-28 RX ADMIN — RANOLAZINE 500 MG: 500 TABLET, EXTENDED RELEASE ORAL at 21:01

## 2024-01-28 RX ADMIN — POLYETHYLENE GLYCOL 3350 17 G: 17 POWDER, FOR SOLUTION ORAL at 09:20

## 2024-01-28 RX ADMIN — CLOPIDOGREL BISULFATE 75 MG: 75 TABLET ORAL at 09:20

## 2024-01-28 RX ADMIN — ISOSORBIDE MONONITRATE 30 MG: 30 TABLET, EXTENDED RELEASE ORAL at 20:59

## 2024-01-28 RX ADMIN — PANTOPRAZOLE SODIUM 40 MG: 40 TABLET, DELAYED RELEASE ORAL at 06:07

## 2024-01-28 RX ADMIN — ACETAMINOPHEN 650 MG: 325 TABLET, FILM COATED ORAL at 06:06

## 2024-01-28 RX ADMIN — ATORVASTATIN CALCIUM 10 MG: 10 TABLET, FILM COATED ORAL at 20:59

## 2024-01-28 RX ADMIN — METOPROLOL SUCCINATE 12.5 MG: 25 TABLET, EXTENDED RELEASE ORAL at 20:59

## 2024-01-28 ASSESSMENT — PAIN DESCRIPTION - LOCATION: LOCATION: LEG

## 2024-01-28 ASSESSMENT — PAIN SCALES - GENERAL
PAINLEVEL_OUTOF10: 8
PAINLEVEL_OUTOF10: 0

## 2024-01-28 ASSESSMENT — PAIN DESCRIPTION - ORIENTATION: ORIENTATION: LEFT

## 2024-01-28 ASSESSMENT — PAIN DESCRIPTION - DESCRIPTORS: DESCRIPTORS: ACHING

## 2024-01-28 NOTE — PROGRESS NOTES
[facility] Rehabilitation Occupational Therapy Daily Treatment Note    Date: 24  Patient Name: Komal Crawford       Room: 2608/2608-01  MRN: [id number]  Account: [id number]   : 1940  (83 y.o.) Gender: female          Diagnosis: Admitted with left femoral neck and shane-prosthetic femur fracture post fall,  L THR 24.   left hip fracture s/p total hip arthroplasty 24. , left distal femur fracture s/p total knee arthroplasty revision 24.  Additional Pertinent Hx: Expand All Collapse All    Physical Medicine & Rehabilitation History and Physical  [facility] Acute Rehabilitation Unit      Primary care provider:  Sabiha Bedolla MD      Chief Complaint and Reason for Rehabilitation Admission:   ADL and mobility deficits secondary to left hip fracture s/p total hip arthroplasty, left distal femur fracture s/p total knee arthroplasty revision     History of Present Illness:  Komal Crawford is a 83 y.o. 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  She was originally admitted to [facility] on 24.     She initially presented after a mechanical fall when attempting to  her dog and her left leg gave out.  Denied hitting her head.  Initial x-rays did not show any pelvic or hip fractures but did show moderate to severe osteoarthritis of the left hip.  MRI left hip showed suspected posttraumatic marrow edema and probably impaction fracture at the superolateral left femoral head.  She was also found to have comminuted impacted fracture of the left distal femur.  She underwent left total hip arthroplasty on 24 (Dr. Bonner) and left total knee arthroplasty revision on 24 (Dr. Keyes).  She is WBAT to the left lower limb with

## 2024-01-28 NOTE — PROGRESS NOTES
Pt was in bed resting as she said she has just finished with therapy and was very tired. Pt's  was in room as well. Pt noted how hard therapy is but determined to get through it. Pt appreciated writer's follow up visit and prayer.    01/28/24 1514   Encounter Summary   Encounter Overview/Reason  Spiritual/Emotional Needs   Service Provided For: Patient and family together   Referral/Consult From: [facility]   Support System Spouse;Family members;Friends/neighbors   Last Encounter  01/28/24   Complexity of Encounter Moderate   Spiritual/Emotional needs   Type Spiritual Support   Assessment/Intervention/Outcome   Assessment Calm;Impaired resilience   Intervention Active listening;Discussed illness injury and it’s impact;Explored/Affirmed feelings, thoughts, concerns;Prayer (assurance of)/Wilson;Sustaining Presence/Ministry of presence   Outcome Engaged in conversation;Expressed feelings, needs, and concerns;Expressed Gratitude;Receptive

## 2024-01-28 NOTE — PROGRESS NOTES
isosorbide mononitrate (IMDUR) 30 MG extended release tablet Take 1 tablet by mouth daily 11/25/22   Ana Carrington MD   clopidogrel (PLAVIX) 75 MG tablet Take 1 tablet by mouth daily 8/5/22   Ana Carrington MD   acetaminophen (TYLENOL) 500 MG tablet Take 2 tablets by mouth every 6 hours as needed for Pain 3/20/22   Delta Teixeira MD   lidocaine (XYLOCAINE) 2 % jelly APPLY TOPICALLY AS NEEDED 3/29/21   Ana Carrington MD   vitamin B-12 (CYANOCOBALAMIN) 1000 MCG tablet Take 1 tablet by mouth once a week  Patient not taking: Reported on 1/18/2024 12/8/20   Sabiha Bedolla MD   Continuous Blood Gluc Sensor (FREESTYLE DAVIS 14 DAY SENSOR) [facility]  2/17/20   Ana Carrington MD   Multiple Vitamins-Minerals (PRESERVISION AREDS PO) Take by mouth daily     Ana Carrington MD   Continuous Blood Gluc  (FREESTYLE DAVIS 14 DAY READER) [facility]  12/9/19   Ana Carrington MD   CRANBERRY PO Take by mouth 2 times daily    Ana Carrington MD        Allergies:     Iodides, Iodinated contrast media, Povidone-iodine, Sulfa antibiotics, Betadine [povidone iodine], Cephalexin, Cephalexin, Cozaar [losartan], Cymbalta [duloxetine hcl], Demerol, Doxycycline, Duloxetine, Meperidine, Morphine, Penicillins, Zithromax [azithromycin], Ciprofloxacin, Clindamycin/lincomycin, Etodolac, Fesoterodine, Fluorescein, Iodine, Lisinopril, Penicillin g, Toviaz [fesoterodine fumarate er], Clonazepam, and Metformin and related    Social History:     Tobacco:    reports that she quit smoking about 41 years ago. Her smoking use included cigarettes. She started smoking about 71 years ago. She has a 15.0 pack-year smoking history. She quit smokeless tobacco use about 41 years ago.  Alcohol:      reports no history of alcohol use.  Drug Use:  reports no history of drug use.    Family History:     Family History   Problem Relation Age of Onset    Breast Cancer Mother     Cancer Mother         breast and uterine   41    Heart Disease Father     Heart Attack Father          32    Cancer Maternal Grandmother     Cancer Brother 52        bronchogenic adenocarcinoma cancer    Lung Cancer Brother     Cancer Sister         lung    Lung Cancer Sister          65    Breast Cancer Sister          57    Cancer Sister         breast       Review of Systems:     Positive and Negative as described in HPI.    CONSTITUTIONAL:  negative for fevers, chills, sweats, fatigue, weight loss  HEENT:  negative for vision, hearing changes, runny nose, throat pain  RESPIRATORY:  negative for shortness of breath, cough, congestion, wheezing.  CARDIOVASCULAR:  negative for chest pain, palpitations.  GASTROINTESTINAL:  negative for nausea, vomiting, diarrhea, constipation, change in bowel habits, abdominal pain   GENITOURINARY:  negative for difficulty of urination, burning with urination, frequency   INTEGUMENT:  negative for rash, skin lesions, easy bruising   HEMATOLOGIC/LYMPHATIC:  negative for swelling/edema   ALLERGIC/IMMUNOLOGIC:  negative for urticaria , itching  ENDOCRINE:  negative increase in drinking, increase in urination, hot or cold intolerance  MUSCULOSKELETAL:  negative joint pains, muscle aches, swelling of joints  NEUROLOGICAL:  negative for headaches, dizziness, lightheadedness, numbness, pain, tingling extremities  BEHAVIOR/PSYCH:  negative for depression, anxiety    Physical Exam:   BP (!) 105/54   Pulse 74   Temp 98.1 °F (36.7 °C) (Oral)   Resp 16   Ht 1.44 m (4' 8.69\")   Wt 75.6 kg (166 lb 11.2 oz)   SpO2 98%   BMI 36.47 kg/m²   Temp (24hrs), Av °F (36.7 °C), Min:97.9 °F (36.6 °C), Max:98.1 °F (36.7 °C)    Recent Labs     24  0550 24  1141 24  1603   POCGLU 155* 127* 144* 152*       Intake/Output Summary (Last 24 hours) at 2024 1451  Last data filed at 2024 0950  Gross per 24 hour   Intake 240 ml   Output 600 ml   Net -360 ml       General

## 2024-01-28 NOTE — CONSULTS
[facility]   IN-PATIENT SERVICE   [facility]  Consult             Date:   1/27/2024  Patient name:  Komal Crawford  Date of admission:  1/26/2024  5:17 PM  MRN:   [id number]  Account:  [id number]  YOB: 1940  PCP:    Sabiha Bedolla MD  Room:   [address] [place] [address]  Code Status:    Full Code    Chief Complaint:     No chief complaint on file.      History Obtained From:     patient    History of Present Illness:     83-year-old female recently discharged from ARU, presenting with fall left hip pain chronic sciatica, x-ray shows no fracture severe osteoarthritis left hip  History of T2DM HTN CAD status post stent 2022, chronic thrombocytopenia chronic anemia, recurrent UTI neurogenic bladder straight cath at home  Orthopedics consulted due for left hip MRI and lumbar MRI done, impacted fracture left femoral neck, status post repair on January 20, 2024, distal comminuted fracture left femur, status post surgery on January 23, aspirin Plavix was on hold,  Aspirin Plavix and Xarelto was on hold due to hemoglobin drop below 7, given unit of blood, hemoglobin came back to 8.4,,  Rechecked this morning on January 26 is 8.4 as well,  Platelet count went up from 1 33-1 49 today,  So far looking very good,  Patient will be discharged to acute rehab, accepted,  Will restart Plavix and Xarelto if okay with orthopedic surgery at this time, will hold aspirin until patient is on Xarelto,  Can go back to aspirin and Plavix once the Xarelto is off as per orthopedic surgery,    Patient now admitted at [facility] acute rehab for further care        Past Medical History:     Past Medical History:   Diagnosis Date    Age-related cognitive decline     Ankle pain     Left ankle fracture in ortho boat.    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HCC) 1990    left with urinary incontinenc post surgical    Caffeine use     2 coffee/day, 1-2 tea per week    Cerebral  bladder, patient self caths at home, has history of benign spinal cord tumor s/p resection, patient on chronic nitrofurantoin therapy for recurrent UTIs, will resume    Hyperlipidemia on Lipitor    Type 2 diabetes mellitus, blood sugars has been stable on Lantus and sliding scale    Chronic thrombocytopenia secondary to ITP follows up with hematology oncology as outpatient    Anemia, likely acute anemia of blood loss secondary to postop  Hemoglobin was 8.7, no active bleeding present, continue to monitor    DVT prophylaxis Xarelto    Consultations:   IP CONSULT TO DIETITIAN  IP CONSULT TO SOCIAL WORK  IP CONSULT TO INTERNAL MEDICINE     Patient is admitted as inpatient status because of co-morbidities listed above, severity of signs and symptoms as outlined, requirement for current medical therapies and most importantly because of direct risk to patient if care not provided in a hospital setting.    Mya Melendez MD  1/27/2024  7:11 PM    Copy sent to Sabiha Christina MD    Please note that this chart was generated using voice recognition Dragon dictation software.  Although every effort was made to ensure the accuracy of this automated transcription, some errors in transcription may have occurred.

## 2024-01-28 NOTE — PROGRESS NOTES
Physical Medicine & Rehabilitation  Progress Note      Subjective:      Komal Crawford is a 83 y.o. female with left hip fracture s/p total hip arthroplasty, left distal femur fracture s/p total knee arthroplasty revision .    She reports doing pretty well today.  She notes constipation but feels like she may have a bowel movement soon.  Plan to trial suppository today.  She denies any other acute concerns.    ROS:  Denies fevers, chills, sweats.  No chest pain, palpitations, lightheadedness.  Denies coughing, wheezing or shortness of breath.  +constipation  No new areas of joint pain.  Denies new areas of numbness or weakness.  Denies new anxiety or depression issues.  No new skin problems.    Rehabilitation:     Physical Therapy    Restrictions/Precautions: Fall Risk, Weight Bearing, Contact Precautions, Bed Alarm, Up as Tolerated  Implants present? : Metal implants ((B) ISHAN and (L) TKA)  Hip Precautions: Posterior hip precautions  Other position/activity restrictions: Pt had a (L) ISHAN  by Dr. JORDYN Bonner on 1-. Pt had surgery on 1- due to a Periprosthetic fracture of femur at tip of prosthesis for a (L) TKA revision by Dr. AMEE Keyes.  Right Lower Extremity Weight Bearing: Weight Bearing As Tolerated  Left Lower Extremity Weight Bearing: Weight Bearing As Tolerated    Bed mobility  Rolling to Left: Maximum assistance  Rolling to Right: Maximum assistance  Supine to Sit: Moderate assistance  Sit to Supine: Maximum assistance  Scooting: Dependent/Total    Transfers  Sit to Stand: 2 Person Assistance, Maximum Assistance (Max Ax3 from low recliner surface, Max Ax2 from EOB with bed elevated.)  Stand to Sit: 2 Person Assistance, Maximum Assistance  Bed to Chair: Dependent/Total (Magda Chand)  Comment: N/A -attempted sit to stand transfers x 2/unable even with the bed height increased.    WB Status: (L) LE WBAT  Ambulation  Device:  (Pt not able to ambulate, Magda Chand used for pregait activity.)  Comments: Not

## 2024-01-28 NOTE — PLAN OF CARE
Problem: Discharge Planning  Goal: Discharge to home or other facility with appropriate resources  Outcome: Progressing  Flowsheets (Taken 1/28/2024 1827)  Discharge to home or other facility with appropriate resources:   Identify barriers to discharge with patient and caregiver   Arrange for needed discharge resources and transportation as appropriate   Identify discharge learning needs (meds, wound care, etc)   Arrange for interpreters to assist at discharge as needed     Problem: Safety - Adult  Goal: Free from fall injury  Outcome: Progressing  Flowsheets (Taken 1/27/2024 1344)  Free From Fall Injury:   Instruct family/caregiver on patient safety   Based on caregiver fall risk screen, instruct family/caregiver to ask for assistance with transferring infant if caregiver noted to have fall risk factors     Problem: ABCDS Injury Assessment  Goal: Absence of physical injury  Outcome: Progressing  Flowsheets (Taken 1/26/2024 1757 by Merle Hernández LPN)  Absence of Physical Injury: Implement safety measures based on patient assessment     Problem: Skin/Tissue Integrity  Goal: Absence of new skin breakdown  Description: 1.  Monitor for areas of redness and/or skin breakdown  2.  Assess vascular access sites hourly  3.  Every 4-6 hours minimum:  Change oxygen saturation probe site  4.  Every 4-6 hours:  If on nasal continuous positive airway pressure, respiratory therapy assess nares and determine need for appliance change or resting period.  Outcome: Progressing

## 2024-01-28 NOTE — PROGRESS NOTES
Physical Therapy  Facility/Department: [facility] ACUTE REHAB  Rehabilitation Physical Therapy    NAME: Komal Crawford  : 1940 (83 y.o.)  MRN: [id number]  CODE STATUS: Full Code    Date of Service: 24      Past Medical History:   Diagnosis Date    Age-related cognitive decline     Ankle pain     Left ankle fracture in ortho boat.    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    Caffeine use     2 coffee/day, 1-2 tea per week    Cerebral artery occlusion with cerebral infarction (HCC)     Chronic back pain     Chronic ITP (idiopathic thrombocytopenia) (HCC)     CVA (cerebral infarction) ,     balance issues, short term memory loss    Diabetic gastroparesis (HCC)     Diverticular disease     GERD (gastroesophageal reflux disease)     Hiatal hernia     Hip fracture (HCC)     History of blood transfusion     History of decreased platelet count     Hyperlipemia     Hypertension     Internal hemorrhoid     Macular degeneration of left eye     S/P laser treatment    Nausea and vomiting     Neuropathy     Osteoarthritis     Ringing in ears     Self-catheterizes urinary bladder     6-7 times daily    TIA (transient ischemic attack) 2000    x1    Tubular adenoma     colon polyps    Type II or unspecified type diabetes mellitus without mention of complication, not stated as uncontrolled     Urinary incontinence     frequent UTI s/p infection, surgical dilatation lead to incontinence    Wears dentures     full on top, partial on bottom    Wears glasses      Past Surgical History:   Procedure Laterality Date    APPENDECTOMY      BLADDER SURGERY      bladder stimulator    BLADDER SUSPENSION      multiple    CARDIAC CATHETERIZATION      no stents    CARDIOVASCULAR STRESS TEST  2014    CATARACT REMOVAL WITH IMPLANT Left 2017    Tyree/Sulaiman    CATARACT REMOVAL WITH IMPLANT Right 2017     Tyree/Sulaiman    COLON SURGERY      colostomy reversal    COLONOSCOPY  04/07/2016    tubular adenoma x3; internal hemorrhoids    COLONOSCOPY N/A 11/06/2019    COLONOSCOPY DIAGNOSTIC performed by Eli Marinelli MD at [facility] ENDO    COLONOSCOPY  11/06/2019    COLOSTOMY  1986    bowel obstruction/ rupture from diverticular disease, reversal 3 mos later    CORONARY ANGIOPLASTY WITH STENT PLACEMENT  08/04/2022    CYSTOSCOPY  09/08/2017    W/ 200IU Botox     FRACTURE SURGERY Right 2011    hip    FRACTURE SURGERY Left 2001    WRIST    FRACTURE SURGERY Left 2013    ankle    HERNIA REPAIR      HIP SURGERY Right 11/6/2023    HIP HARDWARE REMOVAL - WITH DEEP HARDWARE REMOVAL 7.3 NABILA SCREW   X3 performed by Jon Bonner MD at [facility] OR    HYSTERECTOMY (CERVIX STATUS UNKNOWN)  1969    JOINT REPLACEMENT Bilateral 2000    BILAT KNEES    LUMBAR FUSION  2017    L2/L3    NY XCAPSL CTRC RMVL INSJ IO LENS PROSTH W/O ECP Left 11/07/2017    EYE CATARACT EMULSIFICATION IOL IMPLANT performed by Missy Clements MD at [facility] OR    NY XCAPSL CTRC RMVL INSJ IO LENS PROSTH W/O ECP Right 11/28/2017    EYE CATARACT EMULSIFICATION IOL IMPLANT performed by Missy Clements MD at [facility] OR    REVISION COLOSTOMY  1986    REVISION TOTAL KNEE ARTHROPLASTY Right 10/27/2015    WITH POLY EXCHANGE BIOMET AND GPS APPLICATION    REVISION TOTAL KNEE ARTHROPLASTY Left 07/14/2020    KNEE TOTAL ARTHROPLASTY REVISION - POLY EXCHANGE performed by Damian Keyes MD at [facility] OR    REVISION TOTAL KNEE ARTHROPLASTY Left 1/23/2024    KNEE TOTAL ARTHROPLASTY REVISION WITH OSS performed by Damian Keeys MD at [facility] OR    SPINE SURGERY  2010    fusion, did not take    SPINE SURGERY  1990    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      TONSILLECTOMY  1978    TOTAL HIP ARTHROPLASTY Right 11/6/2023    HIP TOTAL ARTHROPLASTY performed by Jon Bonner MD at [facility] OR    TOTAL HIP ARTHROPLASTY Left 1/20/2024    HIP TOTAL

## 2024-01-28 NOTE — PLAN OF CARE
Problem: Discharge Planning  Goal: Discharge to home or other facility with appropriate resources  1/27/2024 2313 by Marjyane Guy RN  Outcome: Progressing  1/27/2024 1344 by Josephine Yusuf II, LPN  Outcome: Progressing  Flowsheets (Taken 1/26/2024 1803 by Merle Hernández LPN)  Discharge to home or other facility with appropriate resources: Identify barriers to discharge with patient and caregiver     Problem: Safety - Adult  Goal: Free from fall injury  1/27/2024 2313 by Maryjane Guy RN  Outcome: Progressing  1/27/2024 1344 by Josephine Yusuf II, LPN  Outcome: Progressing  Flowsheets (Taken 1/27/2024 1344)  Free From Fall Injury:   Instruct family/caregiver on patient safety   Based on caregiver fall risk screen, instruct family/caregiver to ask for assistance with transferring infant if caregiver noted to have fall risk factors     Problem: ABCDS Injury Assessment  Goal: Absence of physical injury  1/27/2024 2313 by Maryjane Guy RN  Outcome: Progressing  1/27/2024 1344 by Josephine Yusuf II, LPN  Outcome: Progressing  Flowsheets (Taken 1/26/2024 1757 by Merle Hernández LPN)  Absence of Physical Injury: Implement safety measures based on patient assessment     Problem: Skin/Tissue Integrity  Goal: Absence of new skin breakdown  Description: 1.  Monitor for areas of redness and/or skin breakdown  2.  Assess vascular access sites hourly  3.  Every 4-6 hours minimum:  Change oxygen saturation probe site  4.  Every 4-6 hours:  If on nasal continuous positive airway pressure, respiratory therapy assess nares and determine need for appliance change or resting period.  1/27/2024 2313 by Maryjane Guy RN  Outcome: Progressing  1/27/2024 1344 by Josephine Yusuf II, LPN  Outcome: Progressing

## 2024-01-29 ENCOUNTER — APPOINTMENT (OUTPATIENT)
Dept: GENERAL RADIOLOGY | Age: 84
DRG: 560 | End: 2024-01-29
Attending: STUDENT IN AN ORGANIZED HEALTH CARE EDUCATION/TRAINING PROGRAM
Payer: MEDICARE

## 2024-01-29 ENCOUNTER — APPOINTMENT (OUTPATIENT)
Dept: GENERAL RADIOLOGY | Age: 84
DRG: 560 | End: 2024-01-29
Attending: PHYSICAL MEDICINE & REHABILITATION
Payer: MEDICARE

## 2024-01-29 PROBLEM — K62.3 RECTAL PROLAPSE: Status: ACTIVE | Noted: 2024-01-29

## 2024-01-29 LAB
GLUCOSE BLD-MCNC: 158 MG/DL (ref 65–105)
GLUCOSE BLD-MCNC: 168 MG/DL (ref 65–105)
HCT VFR BLD AUTO: 24.5 % (ref 36–46)
HGB BLD-MCNC: 7.7 G/DL (ref 12–16)

## 2024-01-29 PROCEDURE — 82947 ASSAY GLUCOSE BLOOD QUANT: CPT

## 2024-01-29 PROCEDURE — 97110 THERAPEUTIC EXERCISES: CPT

## 2024-01-29 PROCEDURE — 36415 COLL VENOUS BLD VENIPUNCTURE: CPT

## 2024-01-29 PROCEDURE — 6370000000 HC RX 637 (ALT 250 FOR IP): Performed by: PHYSICAL MEDICINE & REHABILITATION

## 2024-01-29 PROCEDURE — 97535 SELF CARE MNGMENT TRAINING: CPT

## 2024-01-29 PROCEDURE — 74018 RADEX ABDOMEN 1 VIEW: CPT

## 2024-01-29 PROCEDURE — 99223 1ST HOSP IP/OBS HIGH 75: CPT | Performed by: INTERNAL MEDICINE

## 2024-01-29 PROCEDURE — 6370000000 HC RX 637 (ALT 250 FOR IP): Performed by: INTERNAL MEDICINE

## 2024-01-29 PROCEDURE — 99232 SBSQ HOSP IP/OBS MODERATE 35: CPT | Performed by: PHYSICAL MEDICINE & REHABILITATION

## 2024-01-29 PROCEDURE — 1180000000 HC REHAB R&B

## 2024-01-29 PROCEDURE — 85014 HEMATOCRIT: CPT

## 2024-01-29 PROCEDURE — 85018 HEMOGLOBIN: CPT

## 2024-01-29 PROCEDURE — 97530 THERAPEUTIC ACTIVITIES: CPT

## 2024-01-29 RX ORDER — DEXTROSE MONOHYDRATE 100 MG/ML
INJECTION, SOLUTION INTRAVENOUS CONTINUOUS PRN
Status: DISCONTINUED | OUTPATIENT
Start: 2024-01-29 | End: 2024-02-12 | Stop reason: HOSPADM

## 2024-01-29 RX ADMIN — HYDROCODONE BITARTRATE AND ACETAMINOPHEN 2 TABLET: 5; 325 TABLET ORAL at 12:47

## 2024-01-29 RX ADMIN — RANOLAZINE 500 MG: 500 TABLET, EXTENDED RELEASE ORAL at 21:47

## 2024-01-29 RX ADMIN — PRIMIDONE 50 MG: 50 TABLET ORAL at 21:46

## 2024-01-29 RX ADMIN — TIZANIDINE 2 MG: 2 TABLET ORAL at 23:46

## 2024-01-29 RX ADMIN — PANTOPRAZOLE SODIUM 40 MG: 40 TABLET, DELAYED RELEASE ORAL at 06:02

## 2024-01-29 RX ADMIN — POLYETHYLENE GLYCOL 3350 17 G: 17 POWDER, FOR SOLUTION ORAL at 07:25

## 2024-01-29 RX ADMIN — SENNOSIDES 17.2 MG: 8.6 TABLET, FILM COATED ORAL at 07:24

## 2024-01-29 RX ADMIN — HYDROCODONE BITARTRATE AND ACETAMINOPHEN 1 TABLET: 5; 325 TABLET ORAL at 21:40

## 2024-01-29 RX ADMIN — GLIPIZIDE 5 MG: 10 TABLET ORAL at 06:02

## 2024-01-29 RX ADMIN — INSULIN GLARGINE 14 UNITS: 100 INJECTION, SOLUTION SUBCUTANEOUS at 21:39

## 2024-01-29 RX ADMIN — CLOPIDOGREL BISULFATE 75 MG: 75 TABLET ORAL at 07:24

## 2024-01-29 RX ADMIN — ROPINIROLE HYDROCHLORIDE 4 MG: 1 TABLET, FILM COATED ORAL at 21:40

## 2024-01-29 RX ADMIN — ACETAMINOPHEN 650 MG: 325 TABLET, FILM COATED ORAL at 12:12

## 2024-01-29 RX ADMIN — HYDROCODONE BITARTRATE AND ACETAMINOPHEN 1 TABLET: 5; 325 TABLET ORAL at 07:24

## 2024-01-29 RX ADMIN — ATORVASTATIN CALCIUM 10 MG: 10 TABLET, FILM COATED ORAL at 21:40

## 2024-01-29 RX ADMIN — NITROFURANTOIN MONOHYDRATE/MACROCRYSTALS 100 MG: 75; 25 CAPSULE ORAL at 21:47

## 2024-01-29 RX ADMIN — ISOSORBIDE MONONITRATE 30 MG: 30 TABLET, EXTENDED RELEASE ORAL at 21:40

## 2024-01-29 RX ADMIN — PRIMIDONE 50 MG: 50 TABLET ORAL at 07:26

## 2024-01-29 RX ADMIN — AMLODIPINE BESYLATE 2.5 MG: 2.5 TABLET ORAL at 07:24

## 2024-01-29 RX ADMIN — ACETAMINOPHEN 650 MG: 325 TABLET, FILM COATED ORAL at 06:02

## 2024-01-29 RX ADMIN — ACETAMINOPHEN 650 MG: 325 TABLET, FILM COATED ORAL at 21:45

## 2024-01-29 RX ADMIN — RIVAROXABAN 10 MG: 10 TABLET, FILM COATED ORAL at 21:40

## 2024-01-29 RX ADMIN — NITROFURANTOIN MONOHYDRATE/MACROCRYSTALS 100 MG: 75; 25 CAPSULE ORAL at 07:27

## 2024-01-29 RX ADMIN — RANOLAZINE 500 MG: 500 TABLET, EXTENDED RELEASE ORAL at 07:26

## 2024-01-29 RX ADMIN — METOPROLOL SUCCINATE 12.5 MG: 25 TABLET, EXTENDED RELEASE ORAL at 21:38

## 2024-01-29 ASSESSMENT — PAIN SCALES - GENERAL
PAINLEVEL_OUTOF10: 5
PAINLEVEL_OUTOF10: 4
PAINLEVEL_OUTOF10: 10
PAINLEVEL_OUTOF10: 9

## 2024-01-29 ASSESSMENT — PAIN DESCRIPTION - LOCATION
LOCATION: LEG

## 2024-01-29 ASSESSMENT — PAIN DESCRIPTION - ORIENTATION
ORIENTATION: LEFT

## 2024-01-29 ASSESSMENT — PAIN DESCRIPTION - DESCRIPTORS: DESCRIPTORS: ACHING

## 2024-01-29 NOTE — PATIENT CARE CONFERENCE
[facility] Acute Inpatient Rehabilitation  TEAM CONFERENCE NOTE  Date: 24  Patient Name: Komal Crawford       Room: 2608/2608-01  MRN: [id number]       : 1940  (83 y.o.)     Gender: female     Referring Practitioner: Michael Harrell MD/Angela Sutton MD     PRINCIPAL DIAGNOSIS: Closed fracture of left hip with routine healing    NURSING    Bladder Continence  Not Applicable - Device in Use (Indwelling Catheter, Condom Catheter or Ostomy)  Bowel Continence Always Continent    Date of Last BM: 2024      Bladder/Bowel Program Interventions: No Bladder or Bowel Program Indicated    Wounds/Incisions/Ulcers: Incision healing well    Pain Control: Patient's pain currently controlled and pain regimen effective as ordered    Pain Medication Regimen Usage Pattern: MAR reviewed and pain medications are being used at the following frequency (Specify Medication, # Doses Administered on average per day, identified patterns of use - for example: time of day, prior to activity/therapy)  Norco 5-325, 1-2 tablets PRN q 4 hours, Zanaflex 2 mg nightly PRN    Fall Risk:  Falling star program initiated    Medication Education Program: Patient able to manage medications and being educated by nursing    Discharge Preparation Patient/Responsible Party Education In Progress:   Wound Care    Nursing specific communication for TEAM: No additional information identified requiring communication at this time    PHYSICAL THERAPY  Bed mobility  Bridging:  (Pt did not want to try mobility in p.m. d/t increased pain L LE)  Rolling to Left: Maximum assistance  Rolling to Right: Maximum assistance  Supine to Sit: Moderate assistance  Sit to Supine: Maximum assistance  Scooting: Dependent/Total  Bed Mobility Comments:  (bed mobility did not occur)  Bed Mobility  Bridging:  (Pt did not want to try mobility in p.m. d/t increased pain L LE)  Scooting: Dependent/Total    Transfers:  Sit to Stand: 2 Person Assistance;Maximum Assistance

## 2024-01-29 NOTE — PROGRESS NOTES
Physical Therapy  Facility/Department: [facility] ACUTE REHAB  Rehabilitation Physical Therapy Progress Note.    NAME: Komal Crawford  : 1940 (83 y.o.)  MRN: [id number]  CODE STATUS: Full Code    Date of Service: 24      Past Medical History:   Diagnosis Date    Age-related cognitive decline     Ankle pain     Left ankle fracture in ortho boat.    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    Caffeine use     2 coffee/day, 1-2 tea per week    Cerebral artery occlusion with cerebral infarction (HCC)     Chronic back pain     Chronic ITP (idiopathic thrombocytopenia) (HCC)     CVA (cerebral infarction) ,     balance issues, short term memory loss    Diabetic gastroparesis (HCC)     Diverticular disease     GERD (gastroesophageal reflux disease)     Hiatal hernia     Hip fracture (HCC)     History of blood transfusion     History of decreased platelet count     Hyperlipemia     Hypertension     Internal hemorrhoid     Macular degeneration of left eye     S/P laser treatment    Nausea and vomiting     Neuropathy     Osteoarthritis     Ringing in ears     Self-catheterizes urinary bladder     6-7 times daily    TIA (transient ischemic attack) 2000    x1    Tubular adenoma     colon polyps    Type II or unspecified type diabetes mellitus without mention of complication, not stated as uncontrolled     Urinary incontinence     frequent UTI s/p infection, surgical dilatation lead to incontinence    Wears dentures     full on top, partial on bottom    Wears glasses      Past Surgical History:   Procedure Laterality Date    APPENDECTOMY      BLADDER SURGERY      bladder stimulator    BLADDER SUSPENSION      multiple    CARDIAC CATHETERIZATION  2008    no stents    CARDIOVASCULAR STRESS TEST  2014    CATARACT REMOVAL WITH IMPLANT Left 2017    Raffoul/Sulaiman    CATARACT REMOVAL WITH IMPLANT  Treatment: continue sit <> stand w/ Magda stedy until able to tolerate w/ wheeled walker  Current Treatment Recommendations: Strengthening;ROM;Balance training;Functional mobility training;Transfer training;Gait training;Endurance training;Safety education & training;Patient/Caregiver education & training;Equipment evaluation, education, & procurement;Therapeutic activities;Co-Treatment;Positioning  Safety Devices  Type of Devices: Call light within reach;Patient at risk for falls;Left in bed    EDUCATION  Education  Education Given To: Patient  Education Provided: ADL Function;Mobility Training;Transfer Training  Education Method: Verbal;Demonstration  Education Outcome: Verbalized understanding;Demonstrated understanding;Continued education needed      Therapy Time     01/29/24 0743 01/29/24 0744   PT Individual Minutes   Time In 1006 1334   Time Out 1109 1405   Minutes 63 31             Erica Diop PTA, 01/29/24 at 3:11 PM

## 2024-01-29 NOTE — INTERDISCIPLINARY ROUNDS
[facility] Acute Inpatient Rehabilitation  INTERDISCIPLINARY MEETING  Date: 24  Patient Name: Komal Crawford       Room: 2608/2608-01  MRN: [id number]       : 1940  (83 y.o.)     Gender: female     Patient's care team, including nursing, speech language pathologist, occupational therapist, and/or physical therapist, met to discuss patient's care needs. Any patient concerns, change in medical status, and current transfer/mobility status were identified at this time.     Any Additional Pertinent Information:   Pt requires 2-3 Assist with Magda Chand. Pt reporting that pain meds making her drowsy.    Participating Team Members:  Nurse: Josephine Yusuf II, LPN    Occupational Therapist:  Edna Hammer OT   Physical Therapist: Zoya Ricks  PT

## 2024-01-29 NOTE — CONSULTS
GI Consult Note:    Name: Komal Crawford  MRN: [id number]     Acct: [id number]  Room: [facility]260-    Admit Date: 1/26/2024  PCP: Sabiha Bedolla MD    Physician Requesting Consult: Angela Sutton MD     Reason for Consult:    Rectal prolapse  Chronic constipation  Abdominal discomfort      Chief Complaint:     No chief complaint on file.      History Obtained From:     Patient and EMR     History of Present Illness:      Komal Crawford is a  83 y.o.  female who presents with No chief complaint on file.    This 83-year-old female patient was seen in the rehab unit with history for recent hip fracture  Had replacement done    She has history for chronic constipation and rectal prolapse  Has not had bowel movements for the past 10 days    Denies rectal bleeding denies any melanotic stools    The rectum comes out when she has straining during bowel movements    She has been followed by surgeon as an outpatient    She has been at seen by my partner in the past for similar issues    No smoking alcohol abuse illicit drug usage    Her medical records were reviewed    Care was discussed with Dr. Michael Harrell  Symptoms:  Onset:  Location:  abdomen  Duration:  day(s)  Severity:  moderate  Quality:  intermittent      Past Medical History:     Past Medical History:   Diagnosis Date    Age-related cognitive decline     Ankle pain     Left ankle fracture in ortho boat.    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HCC) 1990    left with urinary incontinenc post surgical    Caffeine use     2 coffee/day, 1-2 tea per week    Cerebral artery occlusion with cerebral infarction (HCC)     Chronic back pain     Chronic ITP (idiopathic thrombocytopenia) (HCC)     CVA (cerebral infarction) 2008, 2010    balance issues, short term memory loss    Diabetic gastroparesis (HCC)     Diverticular disease 1986    GERD (gastroesophageal reflux disease)     Hiatal hernia     Hip fracture ([facility])

## 2024-01-29 NOTE — PROGRESS NOTES
[facility] Rehabilitation Occupational Therapy Daily Treatment Note    Date: 24  Patient Name: Komal Crawford       Room: 2608/2608-01  MRN: [id number]  Account: [id number]   : 1940  (83 y.o.) Gender: female          Diagnosis: Admitted with left femoral neck and shane-prosthetic femur fracture post fall,  L THR 24.   left hip fracture s/p total hip arthroplasty 24. , left distal femur fracture s/p total knee arthroplasty revision 24.  Additional Pertinent Hx: Expand All Collapse All    Physical Medicine & Rehabilitation History and Physical  [facility] Acute Rehabilitation Unit      Primary care provider:  Sabiha Bedolla MD      Chief Complaint and Reason for Rehabilitation Admission:   ADL and mobility deficits secondary to left hip fracture s/p total hip arthroplasty, left distal femur fracture s/p total knee arthroplasty revision     History of Present Illness:  Komal Crawford is a 83 y.o. right-handed female with history of benign tumor of spinal cord s/p resection with residual urinary retention (self caths at home), CVA, CAD s/p stenting, HTN, HLD, type 2 diabetes, chronic ITP, GERD, anxiety admitted to [facility] on 2024.  She was originally admitted to [facility] on 24.     She initially presented after a mechanical fall when attempting to  her dog and her left leg gave out.  Denied hitting her head.  Initial x-rays did not show any pelvic or hip fractures but did show moderate to severe osteoarthritis of the left hip.  MRI left hip showed suspected posttraumatic marrow edema and probably impaction fracture at the superolateral left femoral head.  She was also found to have comminuted impacted fracture of the left distal femur.  She underwent left total hip arthroplasty on 24 (Dr. Bonner) and left total knee arthroplasty revision on 24 (Dr. Keyes).  She is WBAT to the left lower limb with  daniel.  Short Term Goal 5: min A with don slipper socks with equipment.  Short Term Goal 6: Pt will participate in BUE shld, elbow AROM with min resistance 10 reps x 3 ex to increase work tolerance and independence with self care and mobility    Long Term Goals  Time Frame for Long Term Goals : By discharge  Long Term Goal 1: set up with don pullups, pants over feet using equipment, max x 1 to stand and don pants over hips with UE support.  Long Term Goal 2: max x 1 SPT to toilet.  Long Term Goal 3: set up don slipper socks with equipment.  Long Term Goal 4: clay 4-5 min stand with BUE support and max x 1 during adls, ex.    Plan  Occupational Therapy Plan  Times Per Week: 5-7  Times Per Day: Twice a day  Current Treatment Recommendations: Self-Care / ADL, Balance training, Functional mobility training, Endurance training, Pain management, Safety education & training, Patient/Caregiver education & training, Equipment evaluation, education, & procurement         01/29/24 0802 01/29/24 1303   OT Individual Minutes   Time In 0802 1303   Time Out 0905 1330   Minutes 63 27               Electronically signed by Renetta REED on 1/29/24 at 3:34 PM EST

## 2024-01-29 NOTE — PROGRESS NOTES
Physical Medicine & Rehabilitation  Progress Note    1/29/2024 10:52 AM     CC: Ambulatory and ADL dysfunction due to left total hip arthroplasty left distal femur fracture with status post total knee arthroplasty revision    Subjective:   Pain controlled.  No BM for like 10 days.  Patient notes  normal for 10 days also notes has prolapsed rectum ,has had history of GI procedure    ROS:  Denies fevers, chills, sweats.  No chest pain, palpitations, lightheadedness.  Denies coughing, wheezing or shortness of breath.  Denies abdominal pain, nausea, diarrhea or constipation.  No new areas of joint pain.  Denies new areas of numbness or weakness.  Denies new anxiety or depression issues.  No new skin problems.    Rehabilitation:   PT:  Restrictions/Precautions: Fall Risk, Weight Bearing, Contact Precautions, Bed Alarm, Up as Tolerated  Implants present? : Metal implants  Hip Precautions: Posterior hip precautions  Other position/activity restrictions: Pt had a (L) ISHAN  by Dr. JORDYN Bonner on 1-. Pt had surgery on 1- due to a Periprosthetic fracture of femur at tip of prosthesis for a (L) TKA revision by Dr. AMEE Keyes.  Right Lower Extremity Weight Bearing: Weight Bearing As Tolerated  Left Lower Extremity Weight Bearing: Weight Bearing As Tolerated   Transfers  Sit to Stand: 2 Person Assistance, Maximum Assistance (Max Ax3 from low recliner surface, Max Ax2 from EOB with bed elevated.)  Stand to Sit: 2 Person Assistance, Maximum Assistance  Bed to Chair: Dependent/Total (Magda Chand)  Comment: N/A -attempted sit to stand transfers x 2/unable even with the bed height increased.  WB Status: (L) LE WBAT  Ambulation  Device:  (Pt not able to ambulate, Magda Chand used for pregait activity.)  Comments: Not ready at this time.      OT:  ADL  Feeding: Setup  Grooming: Setup  Grooming Skilled Clinical Factors: in w/c up to sink, able to complete denture care ; soak and brush.  UE Bathing: Setup  LE Bathing:  stand to sit. TA x 3 sit to stand from toilet. transfer to w/c with marisol sanchez.               ST:        Objective:  BP (!) 118/42   Pulse 82   Temp 98.2 °F (36.8 °C) (Oral)   Resp 16   Ht 1.44 m (4' 8.69\")   Wt 73.6 kg (162 lb 4.8 oz)   SpO2 98%   BMI 35.50 kg/m²  I Body mass index is 35.5 kg/m². I   Wt Readings from Last 1 Encounters:   24 73.6 kg (162 lb 4.8 oz)      Temp (24hrs), Av.2 °F (36.8 °C), Min:98.2 °F (36.8 °C), Max:98.2 °F (36.8 °C)         GEN: well developed, well nourished, no acute distress  HEENT: Normocephalic atraumatic, EOMI, mucous membranes pink and moist  CV: RRR, no murmurs, rubs or gallops  PULM: CTAB, no rales or rhonchi. Respirations WNL and unlabored  ABD: soft, NT, ND, +BS and equal  NEURO: A&O x3. Sensation intact to light touch.   MSK: 4+/5 upper extremities right lower extremity 4+/5 distally left lower extremity antigravity distally difficult straight leg raise  EXTREMITIES: No calf tenderness to palpation bilaterally.  1+ edema bilateral lower  SKIN: warm dry and intact with good turgor, left hip incision with significant drainage, Aquacel changed, site appears clean, left knee with Aquacel-minimal drainage  PSYCH: appropriately interactive. Affect WNL.          Medications   Scheduled Meds:   nitrofurantoin (macrocrystal-monohydrate)  100 mg Oral 2 times per day    insulin lispro  0-8 Units SubCUTAneous TID WC    insulin lispro  0-4 Units SubCUTAneous Nightly    amLODIPine  2.5 mg Oral QAM    atorvastatin  10 mg Oral Nightly    pantoprazole  40 mg Oral QAM AC    isosorbide mononitrate  30 mg Oral QPM    metoprolol succinate  12.5 mg Oral QPM    primidone  50 mg Oral BID    ranolazine  500 mg Oral BID    rOPINIRole  4 mg Oral Nightly    glipiZIDE  5 mg Oral QAM AC    clopidogrel  75 mg Oral Daily    insulin glargine  14 Units SubCUTAneous Nightly    acetaminophen  650 mg Oral Q6H    polyethylene glycol  17 g Oral Daily    rivaroxaban  10 mg Oral Daily

## 2024-01-29 NOTE — PROGRESS NOTES
artery occlusion with cerebral infarction (HCC)     Chronic back pain     Chronic ITP (idiopathic thrombocytopenia) (HCC)     CVA (cerebral infarction) 2008, 2010    balance issues, short term memory loss    Diabetic gastroparesis (HCC)     Diverticular disease 1986    GERD (gastroesophageal reflux disease)     Hiatal hernia     Hip fracture (HCC)     History of blood transfusion     History of decreased platelet count     Hyperlipemia     Hypertension     Internal hemorrhoid     Macular degeneration of left eye 1980's    S/P laser treatment    Nausea and vomiting     Neuropathy     Osteoarthritis     Ringing in ears     Self-catheterizes urinary bladder     6-7 times daily    TIA (transient ischemic attack) 2000    x1    Tubular adenoma     colon polyps    Type II or unspecified type diabetes mellitus without mention of complication, not stated as uncontrolled     Urinary incontinence     frequent UTI s/p infection, surgical dilatation lead to incontinence    Wears dentures     full on top, partial on bottom    Wears glasses         Past Surgical History:     Past Surgical History:   Procedure Laterality Date    APPENDECTOMY  1962    BLADDER SURGERY      bladder stimulator    BLADDER SUSPENSION  2002    multiple    CARDIAC CATHETERIZATION  2008    no stents    CARDIOVASCULAR STRESS TEST  12/2014    CATARACT REMOVAL WITH IMPLANT Left 11/07/2017    Tyree/Sulaiman    CATARACT REMOVAL WITH IMPLANT Right 11/28/2017    Rafshanique/Sulaiman    COLON SURGERY      colostomy reversal    COLONOSCOPY  04/07/2016    tubular adenoma x3; internal hemorrhoids    COLONOSCOPY N/A 11/06/2019    COLONOSCOPY DIAGNOSTIC performed by Eli Marinelli MD at [facility]    COLONOSCOPY  11/06/2019    COLOSTOMY  1986    bowel obstruction/ rupture from diverticular disease, reversal 3 mos later    CORONARY ANGIOPLASTY WITH STENT PLACEMENT  08/04/2022    CYSTOSCOPY  09/08/2017    W/ 200IU Botox     FRACTURE SURGERY Right 2011    hip     MD  1/29/2024  4:18 PM    Copy sent to Sabiha Christina MD    Please note that this chart was generated using voice recognition Dragon dictation software.  Although every effort was made to ensure the accuracy of this automated transcription, some errors in transcription may have occurred.

## 2024-01-29 NOTE — PLAN OF CARE
Problem: Discharge Planning  Goal: Discharge to home or other facility with appropriate resources  1/29/2024 1414 by Josephine Yusuf II, LPN  Outcome: Progressing  Flowsheets (Taken 1/28/2024 1827)  Discharge to home or other facility with appropriate resources:   Identify barriers to discharge with patient and caregiver   Arrange for needed discharge resources and transportation as appropriate   Identify discharge learning needs (meds, wound care, etc)   Arrange for interpreters to assist at discharge as needed     Problem: Safety - Adult  Goal: Free from fall injury  1/29/2024 1414 by Josephine Yusuf II, LPN  Outcome: Progressing  Flowsheets (Taken 1/27/2024 1344)  Free From Fall Injury:   Instruct family/caregiver on patient safety   Based on caregiver fall risk screen, instruct family/caregiver to ask for assistance with transferring infant if caregiver noted to have fall risk factors     Problem: ABCDS Injury Assessment  Goal: Absence of physical injury  1/29/2024 1414 by Josephine Yusuf II, LPN  Outcome: Progressing  Flowsheets (Taken 1/26/2024 1757 by Merle Hernández LPN)  Absence of Physical Injury: Implement safety measures based on patient assessment     Problem: Skin/Tissue Integrity  Goal: Absence of new skin breakdown  Description: 1.  Monitor for areas of redness and/or skin breakdown  2.  Assess vascular access sites hourly  3.  Every 4-6 hours minimum:  Change oxygen saturation probe site  4.  Every 4-6 hours:  If on nasal continuous positive airway pressure, respiratory therapy assess nares and determine need for appliance change or resting period.  1/29/2024 0315 by Cathy Greenfield, RN  Outcome: Progressing     Problem: Chronic Conditions and Co-morbidities  Goal: Patient's chronic conditions and co-morbidity symptoms are monitored and maintained or improved  Outcome: Progressing  Flowsheets (Taken 1/29/2024 1414)  Care Plan - Patient's Chronic Conditions and Co-Morbidity Symptoms are Monitored  and Maintained or Improved:   Update acute care plan with appropriate goals if chronic or comorbid symptoms are exacerbated and prevent overall improvement and discharge   Monitor and assess patient's chronic conditions and comorbid symptoms for stability, deterioration, or improvement   Collaborate with multidisciplinary team to address chronic and comorbid conditions and prevent exacerbation or deterioration

## 2024-01-29 NOTE — PLAN OF CARE
Individualized Plan of Care  [facility]    Rehabilitation physician: Dr. Sutton     Admit Date: 1/26/2024     Rehabilitation Diagnosis: Closed fracture of left hip with routine healing     Rehabilitation impairments: self care, mobility, bowel/bladder management, pain management, and safety    Factors facilitating achievement of predicted outcomes: Family support, Motivated, Cooperative, and Pleasant  Barriers to the achievement of predicted outcomes: Pain, Decreased endurance, Lower extremity weakness, Medical complications, Skin Care, Wound Care, and Medication managment    Patient Goals: Improve independence with mobility, Improvement of mobility at a wheelchair level, Increase overall strength and endurance, Increase balance, Increase endurance, Increase independence with activities of daily living, Increase safety awareness, Integrate appropriate pain management plan, Assure adequate nutritional option for discharge, Continence of bowel and bladder, and Provide appropriate patient and family education      NURSING:  Nursing goals for Komal Crawford while on the rehabilitation unit will include:  Continence of bowel and bladder, Adequate number of bowel movements, Urinate with no urinary retention >300ml in bladder, Maintain O2 SATs at an acceptable level during stay, Effective pain management while on the rehabilitation unit, Establish adequate pain control plan for discharge, Absence of skin breakdown while on the rehabilitation unit, Improved skin integrity via assessments including wound measurements, Avoidance of any hospital acquired infections, No signs/symptoms of infection at the wound site, Freedom from injury during hospitalization, and Complete education with patient/family with understanding demonstrated regarding disease process and resultant impairment     In order to achieve these goals, nursing interventions may include bowel/bladder training, education for medical  demonstrate will improved strength within all involved planes to 4/5 t0 5/5 MMT to assist with her desired ADLs without limitation.    Plan of Care: Pt to be seen by physical therapy services 1 Hour 30 Minutes per day at least 5 out of 7 days per week     Anticipated interventions may include therapeutic exercises, gait training, neuromuscular re-ed, transfer training, community reintegration, bed mobility, w/c mobility and training.      OCCUPATIONAL THERAPY:  Goals:             Short Term Goals  Time Frame for Short Term Goals: 1 week  Short Term Goal 1: set up UE dressing.  Short Term Goal 2: pt to clay 15 min seated EOB during adls, ex with SBA for static balance  Short Term Goal 3: Pt will tolerate standing 3+ minutes during functional activity with Max A  of 1 using marisol stedy.  Short Term Goal 4: min A with don pullups, pants over feet using equipment, mod x 2 to stand and don pants over hips using marisol stedy.  Short Term Goal 5: min A with don slipper socks with equipment.  Short Term Goal 6: Pt will participate in BUE shld, elbow AROM with min resistance 10 reps x 3 ex to increase work tolerance and independence with self care and mobility  Long Term Goals  Time Frame for Long Term Goals : By discharge  Long Term Goal 1: set up with don pullups, pants over feet using equipment, max x 1 to stand and don pants over hips with UE support.  Long Term Goal 2: max x 1 SPT to toilet.  Long Term Goal 3: set up don slipper socks with equipment.  Long Term Goal 4: clay 4-5 min stand with BUE support and max x 1 during adls, ex.    Plan of Care: Patient to be seen by occupational therapy services 1 Hour 30 Minutes per day at least 5 out of 7 days per week     Anticipated interventions may include ADL and IADL retraining, strengthening, safety education and training, patient/caregiver education and training, equipment evaluation/ training/procurement, neuromuscular reeducation, wheelchair mobility training.      SPEECH

## 2024-01-29 NOTE — PROGRESS NOTES
Dr. Keyes replied via [employer] and recommends to leave Optifoam in place no immobilizer at this time patient WBAT.

## 2024-01-29 NOTE — PROGRESS NOTES
Dr. Keyes notified via [employer] of drainage to surgical site on left hip. Incision cleansed with N.S optifoam applied. No s/s of infection, incision well approximated. Awaiting reply.

## 2024-01-29 NOTE — CARE COORDINATION
[facility]  Case Management Assessment  Initial Evaluation    Date/Time of Evaluation: 1/29/2024 7:44 AM  Assessment Completed by: Herb Monroe RN    If patient is discharged prior to next notation, then this note serves as note for discharge by case management.    Patient Name: Komal Crawford                     Date / Time: 1/26/2024  5:17 PM  YOB: 1940  Diagnosis: Closed right hip fracture, with malunion, subsequent encounter [S72.001P]                     Patient Admission Status: REHAB IP   Readmission Risk (Low < 19, Mod (19-27), High > 27): Readmission Risk Score: 29.5      Current PCP: Sabiha Bedolla MD  PCP verified by CM?    Chart Reviewed: Yes      History Provided by: Patient and chart  Patient Orientation: Oriented x4  Patient Cognition: Alert    Advance Directives:    Code Status: Full Code   Patient's Primary Decision Maker is: Named in Scanned ACP Document    Primary Decision Maker: Ritchie Crawford - Spouse - [phone]    Current Services Prior to Admission:  Current Financial resources: Retired- Social security   Current community resources: None  Current services prior to admission: None  Type of Home Care services:  None  Current Home DME: Rollator, Cane, Walker, Tub transfer bench, Toilet safety frame, Sock aid, Long-handled shoehorn, Adjustable bed, Reacher, Bedside Commode                             Do you have a wheelchair ramp/Plans to build? Yes  Handicap Placard: Has    Discharge Planning:  Patient lives with: Spouse   Type of Home: Trailer/Mobile Home- ramped entrance. One level  Primary Care Giver: Spouse  Marital Status:   Patient Support Systems include: Spouse and [employer] friends  Family can provide assistance at DC: Yes  Does patient have 24 hour assistance at home:  No  Is patient agreeable to VNS or Outpatient therapy Yes  [place] of choice provided: Yes  List of Home Care Agencies/Outpatient therapy provided:  Yes  VNS/Outpatient therapy chosen: Yes- [facility]    Does patient go to outpatient dialysis: No  If yes, location and chair time: N/A    Would you like Case Management to discuss the discharge plan with any other family members/significant others, and if so, who? Friend An  Plans to Return to Present Housing: Yes  Potential Assistance needed at discharge: Home care  Patient expects to discharge to: Trailer/mobile home  Plan for transportation at discharge: Friend    ADLS  Prior functional level:    Current functional level:      PT AM-PAC:   /24  OT AM-PAC:   /24    Financial  Payor: MEDICARE / Plan: MEDICARE PART A AND B / Product Type: *No Product type* /     IMM Letter Status: First Signature Obtained    What Pharmacy do you use:    [facility] - [place], [place] - [address]  P [phone] - F [phone]  [address]  [facility] [address]  Phone: [phone] Fax: [phone]    Meds-to-Beds request: No  Does insurance require precert for SNF: No  Potential assistance Purchasing Medications: No      Notes:  Factors facilitating achievement of predicted outcomes: Family support, Friend support, Motivated, Cooperative, and Pleasant  Barriers to discharge: Pain, Limited safety awareness, Decreased endurance, Lower extremity weakness, and Impaired vision    The Plan for Transition of Care is related to the following treatment goals of Closed right hip fracture, with malunion, subsequent encounter [S72.001P]    IF APPLICABLE: The Patient and/or patient representative Komal and her family were provided with a choice of provider and agrees with the discharge plan. Freedom of choice list with basic dialogue that supports the patient's individualized plan of care/goals and shares the quality data associated with the providers was provided to:     Patient Representative Name:     The Patient and/or Patient Representative Agree with the Discharge Plan?      Additional Case Management Notes: Dispo: Home with

## 2024-01-30 LAB
ANION GAP SERPL CALCULATED.3IONS-SCNC: 9 MMOL/L (ref 9–17)
BASOPHILS # BLD: 0.07 K/UL (ref 0–0.2)
BASOPHILS NFR BLD: 1 % (ref 0–2)
BUN SERPL-MCNC: 21 MG/DL (ref 8–23)
CALCIUM SERPL-MCNC: 8.1 MG/DL (ref 8.6–10.4)
CHLORIDE SERPL-SCNC: 102 MMOL/L (ref 98–107)
CO2 SERPL-SCNC: 23 MMOL/L (ref 20–31)
CREAT SERPL-MCNC: 0.8 MG/DL (ref 0.5–0.9)
EOSINOPHIL # BLD: 0.21 K/UL (ref 0–0.4)
EOSINOPHILS RELATIVE PERCENT: 3 % (ref 0–4)
ERYTHROCYTE [DISTWIDTH] IN BLOOD BY AUTOMATED COUNT: 16.7 % (ref 11.5–14.9)
GFR SERPL CREATININE-BSD FRML MDRD: >60 ML/MIN/1.73M2
GLUCOSE BLD-MCNC: 131 MG/DL (ref 65–105)
GLUCOSE BLD-MCNC: 197 MG/DL (ref 65–105)
GLUCOSE BLD-MCNC: 71 MG/DL (ref 65–105)
GLUCOSE SERPL-MCNC: 142 MG/DL (ref 70–99)
HCT VFR BLD AUTO: 22 % (ref 36–46)
HCT VFR BLD AUTO: 24.9 % (ref 36–46)
HGB BLD-MCNC: 7.1 G/DL (ref 12–16)
HGB BLD-MCNC: 7.9 G/DL (ref 12–16)
IRON SATN MFR SERPL: 24 % (ref 20–55)
IRON SERPL-MCNC: 44 UG/DL (ref 37–145)
LYMPHOCYTES NFR BLD: 0.91 K/UL (ref 1–4.8)
LYMPHOCYTES RELATIVE PERCENT: 13 % (ref 24–44)
MCH RBC QN AUTO: 29 PG (ref 26–34)
MCHC RBC AUTO-ENTMCNC: 32.4 G/DL (ref 31–37)
MCV RBC AUTO: 89.6 FL (ref 80–100)
MONOCYTES NFR BLD: 0.56 K/UL (ref 0.1–1.3)
MONOCYTES NFR BLD: 8 % (ref 1–7)
MORPHOLOGY: ABNORMAL
MORPHOLOGY: ABNORMAL
NEUTROPHILS NFR BLD: 75 % (ref 36–66)
NEUTS SEG NFR BLD: 5.25 K/UL (ref 1.3–9.1)
PLATELET # BLD AUTO: 209 K/UL (ref 150–450)
PMV BLD AUTO: 11.8 FL (ref 6–12)
POTASSIUM SERPL-SCNC: 4.4 MMOL/L (ref 3.7–5.3)
RBC # BLD AUTO: 2.45 M/UL (ref 4–5.2)
SODIUM SERPL-SCNC: 134 MMOL/L (ref 135–144)
TIBC SERPL-MCNC: 186 UG/DL (ref 250–450)
UNSATURATED IRON BINDING CAPACITY: 142 UG/DL (ref 112–347)
WBC OTHER # BLD: 7 K/UL (ref 3.5–11)

## 2024-01-30 PROCEDURE — 1180000000 HC REHAB R&B

## 2024-01-30 PROCEDURE — 82947 ASSAY GLUCOSE BLOOD QUANT: CPT

## 2024-01-30 PROCEDURE — 97110 THERAPEUTIC EXERCISES: CPT

## 2024-01-30 PROCEDURE — 97116 GAIT TRAINING THERAPY: CPT

## 2024-01-30 PROCEDURE — 99232 SBSQ HOSP IP/OBS MODERATE 35: CPT | Performed by: PHYSICAL MEDICINE & REHABILITATION

## 2024-01-30 PROCEDURE — 6370000000 HC RX 637 (ALT 250 FOR IP): Performed by: INTERNAL MEDICINE

## 2024-01-30 PROCEDURE — 85014 HEMATOCRIT: CPT

## 2024-01-30 PROCEDURE — 6370000000 HC RX 637 (ALT 250 FOR IP): Performed by: NURSE PRACTITIONER

## 2024-01-30 PROCEDURE — 99231 SBSQ HOSP IP/OBS SF/LOW 25: CPT | Performed by: INTERNAL MEDICINE

## 2024-01-30 PROCEDURE — 80048 BASIC METABOLIC PNL TOTAL CA: CPT

## 2024-01-30 PROCEDURE — 6370000000 HC RX 637 (ALT 250 FOR IP): Performed by: PHYSICAL MEDICINE & REHABILITATION

## 2024-01-30 PROCEDURE — 83540 ASSAY OF IRON: CPT

## 2024-01-30 PROCEDURE — 85025 COMPLETE CBC W/AUTO DIFF WBC: CPT

## 2024-01-30 PROCEDURE — 97535 SELF CARE MNGMENT TRAINING: CPT

## 2024-01-30 PROCEDURE — 36415 COLL VENOUS BLD VENIPUNCTURE: CPT

## 2024-01-30 PROCEDURE — 99213 OFFICE O/P EST LOW 20 MIN: CPT

## 2024-01-30 PROCEDURE — APPNB30 APP NON BILLABLE TIME 0-30 MINS: Performed by: NURSE PRACTITIONER

## 2024-01-30 PROCEDURE — 83550 IRON BINDING TEST: CPT

## 2024-01-30 PROCEDURE — 85018 HEMOGLOBIN: CPT

## 2024-01-30 PROCEDURE — 97530 THERAPEUTIC ACTIVITIES: CPT

## 2024-01-30 RX ORDER — INSULIN GLARGINE 100 [IU]/ML
10 INJECTION, SOLUTION SUBCUTANEOUS NIGHTLY
Status: DISCONTINUED | OUTPATIENT
Start: 2024-01-30 | End: 2024-02-12 | Stop reason: HOSPADM

## 2024-01-30 RX ORDER — MIDODRINE HYDROCHLORIDE 2.5 MG/1
2.5 TABLET ORAL 3 TIMES DAILY PRN
Status: DISCONTINUED | OUTPATIENT
Start: 2024-01-30 | End: 2024-02-12 | Stop reason: HOSPADM

## 2024-01-30 RX ADMIN — PSYLLIUM HUSK 1 PACKET: 3.4 POWDER ORAL at 16:19

## 2024-01-30 RX ADMIN — METOPROLOL SUCCINATE 12.5 MG: 25 TABLET, EXTENDED RELEASE ORAL at 22:13

## 2024-01-30 RX ADMIN — ATORVASTATIN CALCIUM 10 MG: 10 TABLET, FILM COATED ORAL at 22:14

## 2024-01-30 RX ADMIN — ROPINIROLE HYDROCHLORIDE 4 MG: 1 TABLET, FILM COATED ORAL at 22:14

## 2024-01-30 RX ADMIN — PRIMIDONE 50 MG: 50 TABLET ORAL at 22:20

## 2024-01-30 RX ADMIN — ACETAMINOPHEN 650 MG: 325 TABLET, FILM COATED ORAL at 12:00

## 2024-01-30 RX ADMIN — HYDROCODONE BITARTRATE AND ACETAMINOPHEN 1 TABLET: 5; 325 TABLET ORAL at 16:18

## 2024-01-30 RX ADMIN — LINACLOTIDE 72 MCG: 72 CAPSULE, GELATIN COATED ORAL at 05:54

## 2024-01-30 RX ADMIN — HYDROCODONE BITARTRATE AND ACETAMINOPHEN 2 TABLET: 5; 325 TABLET ORAL at 19:58

## 2024-01-30 RX ADMIN — POLYETHYLENE GLYCOL 3350 17 G: 17 POWDER, FOR SOLUTION ORAL at 07:43

## 2024-01-30 RX ADMIN — CLOPIDOGREL BISULFATE 75 MG: 75 TABLET ORAL at 07:43

## 2024-01-30 RX ADMIN — ACETAMINOPHEN 650 MG: 325 TABLET, FILM COATED ORAL at 18:21

## 2024-01-30 RX ADMIN — INSULIN GLARGINE 10 UNITS: 100 INJECTION, SOLUTION SUBCUTANEOUS at 22:14

## 2024-01-30 RX ADMIN — NITROFURANTOIN MONOHYDRATE/MACROCRYSTALS 100 MG: 75; 25 CAPSULE ORAL at 08:00

## 2024-01-30 RX ADMIN — GLIPIZIDE 5 MG: 10 TABLET ORAL at 05:19

## 2024-01-30 RX ADMIN — NITROFURANTOIN MONOHYDRATE/MACROCRYSTALS 100 MG: 75; 25 CAPSULE ORAL at 22:15

## 2024-01-30 RX ADMIN — RANOLAZINE 500 MG: 500 TABLET, EXTENDED RELEASE ORAL at 08:00

## 2024-01-30 RX ADMIN — PRIMIDONE 50 MG: 50 TABLET ORAL at 08:00

## 2024-01-30 RX ADMIN — RANOLAZINE 500 MG: 500 TABLET, EXTENDED RELEASE ORAL at 22:20

## 2024-01-30 RX ADMIN — PANTOPRAZOLE SODIUM 40 MG: 40 TABLET, DELAYED RELEASE ORAL at 05:19

## 2024-01-30 RX ADMIN — HYDROCODONE BITARTRATE AND ACETAMINOPHEN 2 TABLET: 5; 325 TABLET ORAL at 05:06

## 2024-01-30 RX ADMIN — SENNOSIDES 17.2 MG: 8.6 TABLET, FILM COATED ORAL at 08:00

## 2024-01-30 ASSESSMENT — PAIN SCALES - GENERAL
PAINLEVEL_OUTOF10: 6
PAINLEVEL_OUTOF10: 10
PAINLEVEL_OUTOF10: 5
PAINLEVEL_OUTOF10: 2
PAINLEVEL_OUTOF10: 4
PAINLEVEL_OUTOF10: 4
PAINLEVEL_OUTOF10: 10

## 2024-01-30 ASSESSMENT — PAIN DESCRIPTION - LOCATION
LOCATION: HIP;KNEE
LOCATION: HIP
LOCATION: HIP
LOCATION: HIP;KNEE

## 2024-01-30 ASSESSMENT — PAIN DESCRIPTION - ORIENTATION
ORIENTATION: LEFT

## 2024-01-30 ASSESSMENT — PAIN DESCRIPTION - DESCRIPTORS: DESCRIPTORS: ACHING;SORE

## 2024-01-30 NOTE — PROGRESS NOTES
[facility]  Progress Note      NAME:  Komal Crawford  MEDICAL RECORD NUMBER:  [id number]  AGE: 83 y.o.   GENDER: female  : 1940  TODAY'S DATE:  2024    [facility] nurse follow up visit for sacral wound. Pt last seen on  prior to transfer to acute rehab unit. She continues to have some denudation to her gluteal cleft. Area of purple discoloration is deep red today and has not fully evolved. Pt has been trying to move more. She is wearing a brief today due to having an enema last night and fear that she will not be able to make it to the bathroom in time. Pt stood for assessment with 2 assist and marisol del castillo. Will continue triad cream and foam dressing and continue to follow.       Measurements:  Wound 24 Sacrum Left (Active)   Wound Image   24 1020   Wound Etiology Deep tissue/Injury 24 1020   Dressing Status Old drainage noted;New dressing applied 24 1020   Wound Cleansed Cleansed with saline 24 1020   Dressing/Treatment Triad hydro/zinc oxide-based hydrophilic paste;Foam 24 1020   Wound Length (cm) 5 cm 24 1020   Wound Width (cm) 8 cm 24 1020   Wound Depth (cm) 0.1 cm 24 1020   Wound Surface Area (cm^2) 40 cm^2 24 1020   Change in Wound Size % (l*w) 0 24 1020   Wound Volume (cm^3) 4 cm^3 24 1020   Wound Healing % 0 24 1020   Wound Assessment Pink/red;Purple/maroon 24 1020   Drainage Amount Small (< 25%) 24 1020   Drainage Description Serosanguinous 24 1020   Odor None 24 1020   [place]-wound Assessment Blanchable erythema 24 1020   Margins Attached edges 24 1020   Number of days: 5     Anu Henderson, BSN, RN, CWOCN, WCC, DAPWCA  [facility]  Wound, Ostomy, and Continence Nursing  [phone]

## 2024-01-30 NOTE — PROGRESS NOTES
[facility] Occupational Therapy Daily Treatment Note    Date: 24  Patient Name: Komal Crawford       Room: 2608/2608-01  MRN: [id number]  Account: [id number]   : 1940  (83 y.o.) Gender: female        Diagnosis: Admitted with left femoral neck and shane-prosthetic femur fracture post fall,  L THR 24.   left hip fracture s/p total hip arthroplasty 24. , left distal femur fracture s/p total knee arthroplasty revision 24.  Additional Pertinent Hx: Expand All Collapse All    Physical Medicine & Rehabilitation History and Physical  [facility] Rehabilitation Unit      Primary care provider:  Sabiha Bedolla MD      Chief Complaint and Reason for Rehabilitation Admission:   ADL and mobility deficits secondary to left hip fracture s/p total hip arthroplasty, left distal femur fracture s/p total knee arthroplasty revision     History of Present Illness:  Komal Crawford is a 83 y.o. 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  She was originally admitted to [facility] on 24.     She initially presented after a mechanical fall when attempting to  her dog and her left leg gave out.  Denied hitting her head.  Initial x-rays did not show any pelvic or hip fractures but did show moderate to severe osteoarthritis of the left hip.  MRI left hip showed suspected posttraumatic marrow edema and probably impaction fracture at the superolateral left femoral head.  She was also found to have comminuted impacted fracture of the left distal femur.  She underwent left total hip arthroplasty on 24 (Dr. Bonner) and left total knee arthroplasty revision on 24 (Dr. Keyes).  She is WBAT to the left lower limb with  rails x2 provided EOB to bring hips forward, improved tolerance this date noted    Transfers  Surface: From bed;To chair with arms  Additional Factors: Set-up;Verbal cues;Hand placement cues;Increased time to complete  Device: Lift equipment (with SS)  Sit to Stand  Assistance Level: Dependent;Requires x 2 assistance  Skilled Clinical Factors: max/mod  A x2 with SS, improvement noted this date (PM: max A x3 with SS)  Stand to Sit  Assistance Level: Dependent;Requires x 2 assistance  Skilled Clinical Factors: max/mod A x2 with SS, improvement noted this date (PM: max A x3 with SS)                             OT Exercises  Exercise Treatment: pt participated in BUE exercises for facilitation of increased strength to support safety with ADLs and functional transfers. Pt completes with 2# free weights in all available planes, X15 reps each with RB's as needed (completed semi-fowlers in bed)           Assessment  Assessment  Activity Tolerance: Patient limited by fatigue;Patient limited by pain;Patient limited by endurance (improvement noted this date)  Discharge Recommendations: Patient would benefit from continued therapy after discharge    Patient Education  Education  Education Given To: Patient  Education Provided: Role of Therapy;Plan of Care;Precautions;Safety;ADL Function;Mobility Training;Transfer Training;Fall Prevention Strategies  Education Provided Comments: increasing indep with self care tasks, bed mobility  Education Method: Demonstration;Verbal  Barriers to Learning: None  Education Outcome: Verbalized understanding;Demonstrated understanding;Continued education needed    OT Equipment Recommendations  Equipment Needed: Yes  Other: TBD    Safety Devices  Safety Devices in place: Yes  Type of devices: Nurse notified;Left in chair;Patient at risk for falls;Call light within reach;Chair alarm in place       Goals  Patient Goals   Patient goals : needs to be able to stand to self cath for toileting  Short

## 2024-01-30 NOTE — PROGRESS NOTES
Patient feeling better now that resting in bed. Patient only complaint is feeling tired. . See chart for more details.    Detail Level: Simple Additional Notes: Patient consent was obtained to proceed with the visit and recommended plan of care after discussion of all risks and benefits, including the risks of COVID-19 exposure.

## 2024-01-30 NOTE — PROGRESS NOTES
[facility]    Gastroenterology Daily Progress Note      Patient:   Komal rCawford   :    1940   Facility:   [facility]  Date:     2024  Consultant:   MU Hadley CNP, CNP      SUBJECTIVE  83 y.o. female admitted 2024 with Closed right hip fracture, with malunion, subsequent encounter [S72.001P] and seen for constipation hx rectal prolapse. The pt was seen and examined. She had a small non bloody stool after enema yesterday. No abdominal pain.         OBJECTIVE  Scheduled Meds:   linaCLOtide  72 mcg Oral QAM AC    nitrofurantoin (macrocrystal-monohydrate)  100 mg Oral 2 times per day    insulin lispro  0-8 Units SubCUTAneous TID WC    insulin lispro  0-4 Units SubCUTAneous Nightly    atorvastatin  10 mg Oral Nightly    pantoprazole  40 mg Oral QAM AC    isosorbide mononitrate  30 mg Oral QPM    metoprolol succinate  12.5 mg Oral QPM    primidone  50 mg Oral BID    ranolazine  500 mg Oral BID    rOPINIRole  4 mg Oral Nightly    glipiZIDE  5 mg Oral QAM AC    clopidogrel  75 mg Oral Daily    insulin glargine  14 Units SubCUTAneous Nightly    acetaminophen  650 mg Oral Q6H    polyethylene glycol  17 g Oral Daily    rivaroxaban  10 mg Oral Daily       Vital Signs:  BP (!) 110/58   Pulse 60   Temp 97.6 °F (36.4 °C) (Oral)   Resp 16   Ht 1.44 m (4' 8.69\")   Wt 73.6 kg (162 lb 4.8 oz)   SpO2 (!) 9%   BMI 35.50 kg/m²    Review of Systems - History obtained from chart review and the patient  General ROS: negative  Respiratory ROS: no cough, shortness of breath, or wheezing  Cardiovascular ROS: no chest pain or dyspnea on exertion  Gastrointestinal ROS: no abdominal pain, change in bowel habits, or black or bloody stools   Physical Exam:     General Appearance: alert and oriented to person, place and time, well-developed and well-nourished, in no acute distress  Skin: warm and dry, no rash or erythema  Head: normocephalic and atraumatic  Eyes: pupils equal, round,

## 2024-01-30 NOTE — PROGRESS NOTES
Physical Medicine & Rehabilitation  Progress Note    1/30/2024 1:09 PM     CC: Ambulatory and ADL dysfunction due to left total hip arthroplasty left distal femur fracture with status post total knee arthroplasty revision    Subjective:   Pain controlled.  Small BM yesterday.  Notes some dizziness unchanged position.  Episodes of depression    ROS:  Denies fevers, chills, sweats.  No chest pain, palpitations, lightheadedness.  Denies coughing, wheezing or shortness of breath.  Denies abdominal pain, nausea, diarrhea or constipation.  No new areas of joint pain.  Denies new areas of numbness or weakness.  Denies new anxiety or depression issues.  No new skin problems.    Rehabilitation:   PT:  Restrictions/Precautions: Fall Risk, Weight Bearing, Contact Precautions, Bed Alarm, Up as Tolerated  Implants present? : Metal implants  Hip Precautions: Posterior hip precautions  Other position/activity restrictions: Pt had a (L) ISHAN  by Dr. JORDYN Bonner on 1-. Pt had surgery on 1- due to a Periprosthetic fracture of femur at tip of prosthesis for a (L) TKA revision by Dr. AMEE Keyes.  Right Lower Extremity Weight Bearing: Weight Bearing As Tolerated  Left Lower Extremity Weight Bearing: Weight Bearing As Tolerated   Transfers  Sit to Stand: Maximum Assistance, 2 Person Assistance (Pt able to stand ~ 2 Minutes to have buttock bandage changed, pt leaning on Magda Stedy c forarms, very kyphotic. Pt was able to stand using Magda Stedy one attempt MIN X 2 near end of session.)  Stand to Sit: Maximum Assistance, 2 Person Assistance  Bed to Chair: Dependent/Total (using Magda Stey.)  Comment: N/A -attempted sit to stand transfers x 2/unable even with the bed height increased.  WB Status: (L) LE WBAT (Pt not able stand long enough to attempt gait.)  Ambulation  Device:  (Pt not able to ambulate, Magda Stedy used for pregait activity.)  Comments: Not ready at this time.      OT:  ADL  Feeding: Setup  Grooming: Setup  Grooming

## 2024-01-30 NOTE — PLAN OF CARE
Problem: Discharge Planning  Goal: Discharge to home or other facility with appropriate resources  1/30/2024 0053 by Gail Tirado RN  Outcome: Progressing  Flowsheets (Taken 1/29/2024 2000)  Discharge to home or other facility with appropriate resources:   Identify barriers to discharge with patient and caregiver   Arrange for needed discharge resources and transportation as appropriate   Identify discharge learning needs (meds, wound care, etc)     Problem: Safety - Adult  Goal: Free from fall injury  1/30/2024 0053 by Gail Tirado RN  Outcome: Progressing     Problem: ABCDS Injury Assessment  Goal: Absence of physical injury  1/30/2024 0053 by Gail Tirado, RN  Outcome: Progressing     Problem: Skin/Tissue Integrity  Goal: Absence of new skin breakdown  Description: 1.  Monitor for areas of redness and/or skin breakdown  2.  Assess vascular access sites hourly  3.  Every 4-6 hours minimum:  Change oxygen saturation probe site  4.  Every 4-6 hours:  If on nasal continuous positive airway pressure, respiratory therapy assess nares and determine need for appliance change or resting period.  1/30/2024 0053 by Gail Tirado RN  Outcome: Progressing     Problem: Chronic Conditions and Co-morbidities  Goal: Patient's chronic conditions and co-morbidity symptoms are monitored and maintained or improved  1/30/2024 0053 by Gail Tirado RN  Outcome: Progressing  Flowsheets (Taken 1/29/2024 2000)  Care Plan - Patient's Chronic Conditions and Co-Morbidity Symptoms are Monitored and Maintained or Improved:   Monitor and assess patient's chronic conditions and comorbid symptoms for stability, deterioration, or improvement   Collaborate with multidisciplinary team to address chronic and comorbid conditions and prevent exacerbation or deterioration   Update acute care plan with appropriate goals if chronic or comorbid symptoms are exacerbated and prevent overall improvement and discharge

## 2024-01-30 NOTE — PROGRESS NOTES
Writer encouraged patient to increase her water intake as patient has had poor oral intake during shift today. Patient verbalizes understanding.

## 2024-01-30 NOTE — PROGRESS NOTES
Physical Therapy  Facility/Department: [facility] ACUTE REHAB  Rehabilitation Physical Therapy Progress Note.    NAME: Komal Crawford  : 1940 (83 y.o.)  MRN: [id number]  CODE STATUS: Full Code    Date of Service: 24      Past Medical History:   Diagnosis Date    Age-related cognitive decline     Ankle pain     Left ankle fracture in ortho boat.    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    Caffeine use     2 coffee/day, 1-2 tea per week    Cerebral artery occlusion with cerebral infarction (HCC)     Chronic back pain     Chronic ITP (idiopathic thrombocytopenia) (HCC)     CVA (cerebral infarction) ,     balance issues, short term memory loss    Diabetic gastroparesis (HCC)     Diverticular disease     GERD (gastroesophageal reflux disease)     Hiatal hernia     Hip fracture (HCC)     History of blood transfusion     History of decreased platelet count     Hyperlipemia     Hypertension     Internal hemorrhoid     Macular degeneration of left eye     S/P laser treatment    Nausea and vomiting     Neuropathy     Osteoarthritis     Ringing in ears     Self-catheterizes urinary bladder     6-7 times daily    TIA (transient ischemic attack) 2000    x1    Tubular adenoma     colon polyps    Type II or unspecified type diabetes mellitus without mention of complication, not stated as uncontrolled     Urinary incontinence     frequent UTI s/p infection, surgical dilatation lead to incontinence    Wears dentures     full on top, partial on bottom    Wears glasses      Past Surgical History:   Procedure Laterality Date    APPENDECTOMY      BLADDER SURGERY      bladder stimulator    BLADDER SUSPENSION      multiple    CARDIAC CATHETERIZATION  2008    no stents    CARDIOVASCULAR STRESS TEST  2014    CATARACT REMOVAL WITH IMPLANT Left 2017    Raffoul/Sulaiman    CATARACT REMOVAL WITH IMPLANT

## 2024-01-30 NOTE — PROGRESS NOTES
[facility]   IN-PATIENT SERVICE   [facility]  Consult             Date:   1/30/2024  Patient name:  Komal Crawford  Date of admission:  1/26/2024  5:17 PM  MRN:   [id number]  Account:  [id number]  YOB: 1940  PCP:    Sabiha Bedolla MD  Room:   [address] [place] [address]  Code Status:    Full Code    Chief Complaint:     No chief complaint on file.      History Obtained From:     patient    History of Present Illness:     83-year-old female recently discharged from ARU, presenting with fall left hip pain chronic sciatica, x-ray shows no fracture severe osteoarthritis left hip  History of T2DM HTN CAD status post stent 2022, chronic thrombocytopenia chronic anemia, recurrent UTI neurogenic bladder straight cath at home  Orthopedics consulted due for left hip MRI and lumbar MRI done, impacted fracture left femoral neck, status post repair on January 20, 2024, distal comminuted fracture left femur, status post surgery on January 23, aspirin Plavix was on hold,  Aspirin Plavix and Xarelto was on hold due to hemoglobin drop below 7, given unit of blood, hemoglobin came back to 8.4,,  Rechecked this morning on January 26 is 8.4 as well,  Platelet count went up from 1 33-1 49 today,  So far looking very good,  Patient will be discharged to acute rehab, accepted,  Will restart Plavix and Xarelto if okay with orthopedic surgery at this time, will hold aspirin until patient is on Xarelto,  Can go back to aspirin and Plavix once the Xarelto is off as per orthopedic surgery,    Patient now admitted at [facility] acute rehab for further care        Past Medical History:     Past Medical History:   Diagnosis Date    Age-related cognitive decline     Ankle pain     Left ankle fracture in ortho boat.    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HCC) 1990    left with urinary incontinenc post surgical    Caffeine use     2 coffee/day, 1-2 tea per week    Cerebral  stool softeners  Vitals have been stable  Resumed on nitrofurantoin patient takes nitrofurantoin as suppressive therapy for recurrent UTIs next  Anemia of blood loss, continue to monitor no active bleeding present  On Xarelto and Plavix,   As per Ortho recommendations,  Resume aspirin when off Xarelto.    1/29  Patient seen and examined, continues to complain of pain at the surgical site  Blood sugars been stable  Blood pressure has been borderline lower side    Will DC amlodipine  Drop in hemoglobin hemoglobin of 7.7 likely postop    Needs CBC tomorrow  Get iron studies  On Plavix and Xarelto    1/30  Patient seen and examined  Patient was orthostatic this morning, hemoglobin dropped to 7.1, is been slowly dropping, likely postop hold on Xarelto, check stool occult  Blood sugar was in 70s this morning decrease Lantus to 10 units, very poor p.o. intake encouraged patient to increase p.o. intake  Continue to monitor H&H  Iron studies reviewed, transfuse if drops below 7.  Patient recently had stent placement in September, was on aspirin and Plavix, aspirin was DC'd since patient was started on Xarelto, continue Plavix for now  Hold onto Imdur, put ProAmatine as needed.  Consultations:   IP CONSULT TO DIETITIAN  IP CONSULT TO SOCIAL WORK  IP CONSULT TO INTERNAL MEDICINE  IP CONSULT TO GI     Patient is admitted as inpatient status because of co-morbidities listed above, severity of signs and symptoms as outlined, requirement for current medical therapies and most importantly because of direct risk to patient if care not provided in a hospital setting.    Mya Melendez MD  1/30/2024  5:07 PM    Copy sent to Sabiha Christina MD    Please note that this chart was generated using voice recognition Dragon dictation software.  Although every effort was made to ensure the accuracy of this automated transcription, some errors in transcription may have occurred.

## 2024-01-30 NOTE — PROGRESS NOTES
Writer called into room by therapy as patient was in chair working with OT when patient started c/o feeling dizzy and lightheaded. BP 70/47 taken by OT. Writer into room and patient states she feels as though she is \"sinking\" into her chair. BP rechecked with reading of 124/47. Patient assisted safely back to bed but patient continues to c/o feeling dizzy, lightheaded and feeling SOB. Internal medicine notified. Call to PM&R office with no answer. Will call back.

## 2024-01-31 ENCOUNTER — APPOINTMENT (OUTPATIENT)
Dept: VASCULAR LAB | Age: 84
DRG: 560 | End: 2024-01-31
Attending: PHYSICAL MEDICINE & REHABILITATION
Payer: MEDICARE

## 2024-01-31 LAB
DATE, STOOL #1: NORMAL
GLUCOSE BLD-MCNC: 152 MG/DL (ref 65–105)
GLUCOSE BLD-MCNC: 166 MG/DL (ref 65–105)
GLUCOSE BLD-MCNC: 170 MG/DL (ref 65–105)
GLUCOSE BLD-MCNC: 73 MG/DL (ref 65–105)
HCT VFR BLD AUTO: 21.2 % (ref 36–46)
HCT VFR BLD AUTO: 23.1 % (ref 36–46)
HCT VFR BLD AUTO: 28.4 % (ref 36–46)
HCT VFR BLD AUTO: 28.4 % (ref 36–46)
HEMOCCULT SP1 STL QL: NEGATIVE
HGB BLD-MCNC: 6.8 G/DL (ref 12–16)
HGB BLD-MCNC: 7.4 G/DL (ref 12–16)
HGB BLD-MCNC: 9.3 G/DL (ref 12–16)
HGB BLD-MCNC: 9.3 G/DL (ref 12–16)
TIME, STOOL #1: NORMAL

## 2024-01-31 PROCEDURE — 6370000000 HC RX 637 (ALT 250 FOR IP): Performed by: INTERNAL MEDICINE

## 2024-01-31 PROCEDURE — 6370000000 HC RX 637 (ALT 250 FOR IP): Performed by: PHYSICAL MEDICINE & REHABILITATION

## 2024-01-31 PROCEDURE — 99231 SBSQ HOSP IP/OBS SF/LOW 25: CPT | Performed by: INTERNAL MEDICINE

## 2024-01-31 PROCEDURE — 86920 COMPATIBILITY TEST SPIN: CPT

## 2024-01-31 PROCEDURE — 36430 TRANSFUSION BLD/BLD COMPNT: CPT

## 2024-01-31 PROCEDURE — 86850 RBC ANTIBODY SCREEN: CPT

## 2024-01-31 PROCEDURE — 99232 SBSQ HOSP IP/OBS MODERATE 35: CPT | Performed by: PHYSICAL MEDICINE & REHABILITATION

## 2024-01-31 PROCEDURE — 6370000000 HC RX 637 (ALT 250 FOR IP): Performed by: NURSE PRACTITIONER

## 2024-01-31 PROCEDURE — 36415 COLL VENOUS BLD VENIPUNCTURE: CPT

## 2024-01-31 PROCEDURE — 82270 OCCULT BLOOD FECES: CPT

## 2024-01-31 PROCEDURE — 1180000000 HC REHAB R&B

## 2024-01-31 PROCEDURE — 99232 SBSQ HOSP IP/OBS MODERATE 35: CPT | Performed by: INTERNAL MEDICINE

## 2024-01-31 PROCEDURE — 82947 ASSAY GLUCOSE BLOOD QUANT: CPT

## 2024-01-31 PROCEDURE — APPNB30 APP NON BILLABLE TIME 0-30 MINS: Performed by: NURSE PRACTITIONER

## 2024-01-31 PROCEDURE — 93971 EXTREMITY STUDY: CPT

## 2024-01-31 PROCEDURE — 85014 HEMATOCRIT: CPT

## 2024-01-31 PROCEDURE — P9016 RBC LEUKOCYTES REDUCED: HCPCS

## 2024-01-31 PROCEDURE — 85018 HEMOGLOBIN: CPT

## 2024-01-31 PROCEDURE — 30233N1 TRANSFUSION OF NONAUTOLOGOUS RED BLOOD CELLS INTO PERIPHERAL VEIN, PERCUTANEOUS APPROACH: ICD-10-PCS | Performed by: INTERNAL MEDICINE

## 2024-01-31 PROCEDURE — 86900 BLOOD TYPING SEROLOGIC ABO: CPT

## 2024-01-31 PROCEDURE — 86901 BLOOD TYPING SEROLOGIC RH(D): CPT

## 2024-01-31 RX ORDER — SENNOSIDES A AND B 8.6 MG/1
2 TABLET, FILM COATED ORAL NIGHTLY
Status: DISCONTINUED | OUTPATIENT
Start: 2024-01-31 | End: 2024-02-02

## 2024-01-31 RX ORDER — SODIUM CHLORIDE 9 MG/ML
INJECTION, SOLUTION INTRAVENOUS PRN
Status: DISCONTINUED | OUTPATIENT
Start: 2024-01-31 | End: 2024-02-11

## 2024-01-31 RX ADMIN — TIZANIDINE 2 MG: 2 TABLET ORAL at 00:43

## 2024-01-31 RX ADMIN — ACETAMINOPHEN 650 MG: 325 TABLET, FILM COATED ORAL at 17:52

## 2024-01-31 RX ADMIN — ACETAMINOPHEN 650 MG: 325 TABLET, FILM COATED ORAL at 00:33

## 2024-01-31 RX ADMIN — PRIMIDONE 50 MG: 50 TABLET ORAL at 09:35

## 2024-01-31 RX ADMIN — ATORVASTATIN CALCIUM 10 MG: 10 TABLET, FILM COATED ORAL at 22:14

## 2024-01-31 RX ADMIN — LINACLOTIDE 72 MCG: 72 CAPSULE, GELATIN COATED ORAL at 07:25

## 2024-01-31 RX ADMIN — NITROFURANTOIN MONOHYDRATE/MACROCRYSTALS 100 MG: 75; 25 CAPSULE ORAL at 22:13

## 2024-01-31 RX ADMIN — ROPINIROLE HYDROCHLORIDE 4 MG: 1 TABLET, FILM COATED ORAL at 22:13

## 2024-01-31 RX ADMIN — HYDROCODONE BITARTRATE AND ACETAMINOPHEN 1 TABLET: 5; 325 TABLET ORAL at 00:43

## 2024-01-31 RX ADMIN — NITROFURANTOIN MONOHYDRATE/MACROCRYSTALS 100 MG: 75; 25 CAPSULE ORAL at 09:35

## 2024-01-31 RX ADMIN — TIZANIDINE 2 MG: 2 TABLET ORAL at 22:37

## 2024-01-31 RX ADMIN — PSYLLIUM HUSK 1 PACKET: 3.4 POWDER ORAL at 09:35

## 2024-01-31 RX ADMIN — CLOPIDOGREL BISULFATE 75 MG: 75 TABLET ORAL at 09:35

## 2024-01-31 RX ADMIN — PRIMIDONE 50 MG: 50 TABLET ORAL at 22:14

## 2024-01-31 RX ADMIN — HYDROCODONE BITARTRATE AND ACETAMINOPHEN 1 TABLET: 5; 325 TABLET ORAL at 22:12

## 2024-01-31 RX ADMIN — GLIPIZIDE 5 MG: 10 TABLET ORAL at 07:25

## 2024-01-31 RX ADMIN — METOPROLOL SUCCINATE 12.5 MG: 25 TABLET, EXTENDED RELEASE ORAL at 22:14

## 2024-01-31 RX ADMIN — RANOLAZINE 500 MG: 500 TABLET, EXTENDED RELEASE ORAL at 09:35

## 2024-01-31 RX ADMIN — POLYETHYLENE GLYCOL 3350 17 G: 17 POWDER, FOR SOLUTION ORAL at 09:35

## 2024-01-31 RX ADMIN — SENNOSIDES 17.2 MG: 8.6 TABLET, FILM COATED ORAL at 22:14

## 2024-01-31 RX ADMIN — INSULIN GLARGINE 10 UNITS: 100 INJECTION, SOLUTION SUBCUTANEOUS at 22:15

## 2024-01-31 RX ADMIN — RANOLAZINE 500 MG: 500 TABLET, EXTENDED RELEASE ORAL at 22:14

## 2024-01-31 ASSESSMENT — PAIN DESCRIPTION - ORIENTATION
ORIENTATION: LEFT
ORIENTATION: LEFT;RIGHT
ORIENTATION: RIGHT

## 2024-01-31 ASSESSMENT — PAIN SCALES - GENERAL
PAINLEVEL_OUTOF10: 6
PAINLEVEL_OUTOF10: 8
PAINLEVEL_OUTOF10: 3
PAINLEVEL_OUTOF10: 8

## 2024-01-31 ASSESSMENT — PAIN DESCRIPTION - LOCATION
LOCATION: HIP;LEG
LOCATION: HIP;KNEE;LEG
LOCATION: HIP

## 2024-01-31 ASSESSMENT — PAIN DESCRIPTION - DESCRIPTORS: DESCRIPTORS: ACHING

## 2024-01-31 NOTE — PROGRESS NOTES
Writer took critical lab result, Hgb 6.8. Writer relayed message to patient's primary nurse, Sherrie MEJIA. Patient's type and screen  at 0000 on .

## 2024-01-31 NOTE — PROGRESS NOTES
.Writer attended patient for first 15 min of blood transfusion and no potential blood reaction was recognized. VS documented. Will continue to monitor.

## 2024-01-31 NOTE — PROGRESS NOTES
Nutrition Education    Pt states she historically goes up to 10 days without a BM. At home, states she was able to pass BMs within a shorter time frame if she included all of medication regimen recommended. States she has pain and discomfort with constipation. Reports that another gastroenterologist suggested that she have a colostomy to help with bowel regulation and she is considering this after she recovers from her hip surgeries. Has tried increasing fiber intake in the past, but plans to try further increase as well as additional fluid intake to see if this possibly will help.    Educated on Nutrition Therapy for Constipation. Basic review of carbohydrate control diet  Learners: Patient,   Readiness: Acceptance  Method: Explanation and Handout (only verbal review of carbohydrate control)  Response: Verbalizes Understanding  Contact name and number provided.    Rasheeda Bloom RD, LD  Contact Number: [phone]

## 2024-01-31 NOTE — INTERDISCIPLINARY ROUNDS
[facility] Inpatient Rehabilitation  INTERDISCIPLINARY MEETING  Date: 24  Patient Name: Komal Crawford       Room: 2608/2608-01  MRN: [id number]       : 1940  (83 y.o.)     Gender: female     Patient's care team, including nursing, speech language pathologist, occupational therapist, and/or physical therapist, met to discuss patient's care needs. Any patient concerns, change in medical status, and current transfer/mobility status were identified at this time.     Any Additional Pertinent Information:   Currently receiving blood transfusion; Likely to be completed by 11:30 AM. Check with RN.    Participating Team Members:  Nurse: Maame Calderon RN    Occupational Therapist:    Physical Therapist: Zoya Ricks PT  Speech Therapist:

## 2024-01-31 NOTE — PROGRESS NOTES
Notified on-call IM NP Pam Castellanos about patients HGB=6.8 and type and cross , needed a updated one. She placed an order to give PRBC once ready. Currently awaiting blood

## 2024-01-31 NOTE — PROGRESS NOTES
[facility]    Gastroenterology Daily Progress Note      Patient:   Komal Crawford   :    1940   Facility:   [facility]  Date:     2024  Consultant:   MU Hadley CNP, CNP      SUBJECTIVE  83 y.o. female admitted 2024 with Closed right hip fracture, with malunion, subsequent encounter [S72.001P] and seen for constipation with hx of prolapsed rectum. The pt was seen and examined. Pt had non bloody BM, slowly increasing po intake, no c/o nausea or abdominal pain.         OBJECTIVE  Scheduled Meds:   psyllium  1 packet Oral Daily    insulin glargine  10 Units SubCUTAneous Nightly    linaCLOtide  72 mcg Oral QAM AC    nitrofurantoin (macrocrystal-monohydrate)  100 mg Oral 2 times per day    insulin lispro  0-8 Units SubCUTAneous TID WC    insulin lispro  0-4 Units SubCUTAneous Nightly    atorvastatin  10 mg Oral Nightly    pantoprazole  40 mg Oral QAM AC    [Held by provider] isosorbide mononitrate  30 mg Oral QPM    metoprolol succinate  12.5 mg Oral QPM    primidone  50 mg Oral BID    ranolazine  500 mg Oral BID    rOPINIRole  4 mg Oral Nightly    glipiZIDE  5 mg Oral QAM AC    clopidogrel  75 mg Oral Daily    acetaminophen  650 mg Oral Q6H    polyethylene glycol  17 g Oral Daily    [Held by provider] rivaroxaban  10 mg Oral Daily       Vital Signs:  /60   Pulse 68   Temp 98.2 °F (36.8 °C) (Oral)   Resp 19   Ht 1.44 m (4' 8.69\")   Wt 73.6 kg (162 lb 4.8 oz)   SpO2 98%   BMI 35.50 kg/m²    Review of Systems - History obtained from chart review and the patient  General ROS: negative  Respiratory ROS: no cough, shortness of breath, or wheezing  Cardiovascular ROS: no chest pain or dyspnea on exertion  Gastrointestinal ROS: no abdominal pain, , or black or bloody stools  Physical Exam:     General Appearance: alert and oriented to person, place and time, well-developed and well-nourished, in no acute distress  Skin: warm and dry, no rash or erythema  Head:

## 2024-01-31 NOTE — PROGRESS NOTES
Writer confirmed with vascular lab that order is still active for LLE doppler. Awaiting test to be done still.

## 2024-01-31 NOTE — PROGRESS NOTES
Patient very tearful and expresses feelings of depression. Writer attempted to call patients  per her request but no answer. Perfect serve sent to our  on call.

## 2024-01-31 NOTE — PROGRESS NOTES
[facility]   OCCUPATIONAL THERAPY MISSED TREATMENT NOTE   ACUTE REHAB  Date: 24  Patient Name: Komal Crawford       Room: 2608/2608-01  MRN: [id number]   Account #: [id number]    : 1940  (83 y.o.)  Gender: female   Diagnosis: Admitted with left femoral neck and shane-prosthetic femur fracture post fall,  L THR 24.   left hip fracture s/p total hip arthroplasty 24. , left distal femur fracture s/p total knee arthroplasty revision 24.             REASON FOR MISSED TREATMENT:  Hold treatment per nursing request   -     24 1417   Minute Variance   Variance 90  (Pt is a Med hold in AM: receiving blood transfusion for low hemoglobin. Med Hold PM: awaiting dopplers)   Reason Med hold         Electronically signed by BRIANNA Irvin on 24 at 2:30 PM EST

## 2024-01-31 NOTE — PROGRESS NOTES
Writer responded to PerfectServe request from Maame Calderon RN  Patient states \"I'm okay - just, so very sad this morning\"   Patient states that their daughter is in heaven and she and her  have been  for 63 years - just afraid to leave him alone.   Writer provided listening/pastoral presence and prayer   Patient states - \"thank you - now I can rest\"     01/31/24 0819   Encounter Summary   Encounter Overview/Reason  Spiritual/Emotional Needs   Service Provided For: Patient   Referral/Consult From: Nurse;Patient   Support System Spouse   Last Encounter  01/31/24   Complexity of Encounter Moderate   Begin Time 0745   End Time  0810   Total Time Calculated 25 min   Spiritual/Emotional needs   Type Emotional Distress;Spiritual Support   Grief, Loss, and Adjustments   Type Adjustment to illness   Assessment/Intervention/Outcome   Assessment Tearful;Sad   Intervention Sustaining Presence/Ministry of presence;Active listening;Life review/Legacy;Explored/Affirmed feelings, thoughts, concerns;Nurtured Hope;Prayer (assurance of)/[place]   Outcome Peace;Comfort;Expressed Gratitude

## 2024-01-31 NOTE — PROGRESS NOTES
Physical Medicine & Rehabilitation  Progress Note    1/31/2024 10:52 AM     CC: Ambulatory and ADL dysfunction due to left total hip arthroplasty left distal femur fracture with status post total knee arthroplasty revision    Subjective:   Pain controlled.  No significant depression.  Talked with her .  Declined psych consult.  Has Winters, denies difficulty with bowels    ROS:  Denies fevers, chills, sweats.  No chest pain, palpitations, lightheadedness.  Denies coughing, wheezing or shortness of breath.  Denies abdominal pain, nausea, diarrhea or constipation.  No new areas of joint pain.  Denies new areas of numbness or weakness.  Denies new anxiety or depression issues.  No new skin problems.    Rehabilitation:   PT:  Restrictions/Precautions: Fall Risk, Weight Bearing, Contact Precautions, Bed Alarm, Up as Tolerated  Implants present? : Metal implants  Hip Precautions: Posterior hip precautions  Other position/activity restrictions: Pt had a (L) ISHAN  by Dr. JORDYN Bonner on 1-. Pt had surgery on 1- due to a Periprosthetic fracture of femur at tip of prosthesis for a (L) TKA revision by Dr. AMEE Keyes.  Right Lower Extremity Weight Bearing: Weight Bearing As Tolerated  Left Lower Extremity Weight Bearing: Weight Bearing As Tolerated   Transfers  Sit to Stand: Maximum Assistance, 2 Person Assistance (Pt able to stand ~ 2 Minutes to have buttock bandage changed, pt leaning on Magda Stedy c forarms, very kyphotic. Pt was able to stand using Magda Stedy one attempt MIN X 2 near end of session.)  Stand to Sit: Maximum Assistance, 2 Person Assistance  Bed to Chair: Dependent/Total (using Magda Stey.)  Comment: N/A -attempted sit to stand transfers x 2/unable even with the bed height increased.  WB Status: (L) LE WBAT (Pt not able stand long enough to attempt gait.)  Ambulation  Device:  (Pt not able to ambulate, Magda Stedy used for pregait activity.)  Comments: Not ready at this time.      OT:  ADL  Feeding:

## 2024-01-31 NOTE — PROGRESS NOTES
PLAN:DATE: 2024    NAME: Komal Crawford  MRN: [id number]   : 1940    Patient not seen this date for Physical Therapy due to:      [x] Cancel by RN or physician due to:  nursing deferred treatment low HGB in a.m., receiving blood, p.m. doppler ordered, waiting on new HGB reading,    [] Hemodialysis    [] Critical Lab Value Level     [] Blood transfusion in progress    [] Acute or unstable cardiovascular status   _MAP < 55 or more than >115  _HR < 40 or > 130    [] Acute or unstable pulmonary status   -FiO2 > 60%   _RR < 5 or >40    _O2 sats < 85%    [] Strict Bedrest    [] Off Unit for surgery or procedure    [] Off Unit for testing       [] Pending imaging to R/O fracture    [] Refusal by Patient      [] Other      [] PT being discontinued at this time. Patient independent. No further needs.     [] PT being discontinued at this time as the patient has been transferred to hospice care. No further needs.      Erica Diop, PTA

## 2024-01-31 NOTE — PROGRESS NOTES
[facility]   IN-PATIENT SERVICE   [facility]  Consult             Date:   1/31/2024  Patient name:  Komal Crawford  Date of admission:  1/26/2024  5:17 PM  MRN:   [id number]  Account:  [id number]  YOB: 1940  PCP:    Sabiha Bedolla MD  Room:   [address] [place] [address]  Code Status:    Full Code    Chief Complaint:     No chief complaint on file.      History Obtained From:     patient    History of Present Illness:     83-year-old female recently discharged from ARU, presenting with fall left hip pain chronic sciatica, x-ray shows no fracture severe osteoarthritis left hip  History of T2DM HTN CAD status post stent 2022, chronic thrombocytopenia chronic anemia, recurrent UTI neurogenic bladder straight cath at home  Orthopedics consulted due for left hip MRI and lumbar MRI done, impacted fracture left femoral neck, status post repair on January 20, 2024, distal comminuted fracture left femur, status post surgery on January 23, aspirin Plavix was on hold,  Aspirin Plavix and Xarelto was on hold due to hemoglobin drop below 7, given unit of blood, hemoglobin came back to 8.4,,  Rechecked this morning on January 26 is 8.4 as well,  Platelet count went up from 1 33-1 49 today,  So far looking very good,  Patient will be discharged to acute rehab, accepted,  Will restart Plavix and Xarelto if okay with orthopedic surgery at this time, will hold aspirin until patient is on Xarelto,  Can go back to aspirin and Plavix once the Xarelto is off as per orthopedic surgery,    Patient now admitted at [facility] acute rehab for further care        Past Medical History:     Past Medical History:   Diagnosis Date    Age-related cognitive decline     Ankle pain     Left ankle fracture in ortho boat.    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, patient on chronic nitrofurantoin therapy for recurrent UTIs, will resume    Hyperlipidemia on Lipitor    Type 2 diabetes mellitus, blood sugars has been stable on Lantus and sliding scale    Chronic thrombocytopenia secondary to ITP follows up with hematology oncology as outpatient    Anemia, likely acute anemia of blood loss secondary to postop  Hemoglobin was 8.7, no active bleeding present, continue to monitor    DVT prophylaxis Xarelto    1/289Patient seen and examined, complaining of constipation  On stool softeners  Vitals have been stable  Resumed on nitrofurantoin patient takes nitrofurantoin as suppressive therapy for recurrent UTIs next  Anemia of blood loss, continue to monitor no active bleeding present  On Xarelto and Plavix,   As per Ortho recommendations,  Resume aspirin when off Xarelto.    1/29  Patient seen and examined, continues to complain of pain at the surgical site  Blood sugars been stable  Blood pressure has been borderline lower side    Will DC amlodipine  Drop in hemoglobin hemoglobin of 7.7 likely postop    Needs CBC tomorrow  Get iron studies  On Plavix and Xarelto    1/30  Patient seen and examined  Patient was orthostatic this morning, hemoglobin dropped to 7.1, is been slowly dropping, likely postop hold on Xarelto, check stool occult  Blood sugar was in 70s this morning decrease Lantus to 10 units, very poor p.o. intake encouraged patient to increase p.o. intake  Continue to monitor H&H  Iron studies reviewed, transfuse if drops below 7.  Patient recently had stent placement in September, was on aspirin and Plavix, aspirin was DC'd since patient was started on Xarelto, continue Plavix for now  Hold onto Imdur, put ProAmatine as needed.    1/31  Patient seen and examined  Her hemoglobin dropped to 6.8, patient received 1 unit of blood transfusion, hemoglobin now 9.3, stool occult is negative    Her vitals have been stable, blood pressure is

## 2024-01-31 NOTE — CARE COORDINATION
ARU CASE MANAGEMENT NOTE:    Patient is alert and oriented x4.    Spoke with patient regarding discharge plan: Return home with spouse and would like to have [facility]. Referral was sent to [facility].     Outside appointments while in ARU: 02/06 at 3:40 pm with Dr. Bonner.  Per Dr. Harrell consult will be placed for Dr. Bonner to come see patient here.     Will continue to follow for additional discharge needs.      Electronically signed by Herb Monroe RN on 1/31/2024 at 3:34 PM

## 2024-02-01 ENCOUNTER — APPOINTMENT (OUTPATIENT)
Dept: GENERAL RADIOLOGY | Age: 84
DRG: 560 | End: 2024-02-01
Attending: STUDENT IN AN ORGANIZED HEALTH CARE EDUCATION/TRAINING PROGRAM
Payer: MEDICARE

## 2024-02-01 LAB
ECHO BSA: 1.74 M2
GLUCOSE BLD-MCNC: 103 MG/DL (ref 65–105)
GLUCOSE BLD-MCNC: 131 MG/DL (ref 65–105)
GLUCOSE BLD-MCNC: 151 MG/DL (ref 65–105)
HCT VFR BLD AUTO: 27.6 % (ref 36–46)
HCT VFR BLD AUTO: 33.1 % (ref 36–46)
HGB BLD-MCNC: 10.6 G/DL (ref 12–16)
HGB BLD-MCNC: 9 G/DL (ref 12–16)

## 2024-02-01 PROCEDURE — 6370000000 HC RX 637 (ALT 250 FOR IP): Performed by: INTERNAL MEDICINE

## 2024-02-01 PROCEDURE — 93971 EXTREMITY STUDY: CPT | Performed by: SURGERY

## 2024-02-01 PROCEDURE — 99232 SBSQ HOSP IP/OBS MODERATE 35: CPT | Performed by: INTERNAL MEDICINE

## 2024-02-01 PROCEDURE — 6370000000 HC RX 637 (ALT 250 FOR IP): Performed by: PHYSICAL MEDICINE & REHABILITATION

## 2024-02-01 PROCEDURE — 85018 HEMOGLOBIN: CPT

## 2024-02-01 PROCEDURE — 36415 COLL VENOUS BLD VENIPUNCTURE: CPT

## 2024-02-01 PROCEDURE — 99232 SBSQ HOSP IP/OBS MODERATE 35: CPT | Performed by: PHYSICAL MEDICINE & REHABILITATION

## 2024-02-01 PROCEDURE — 1180000000 HC REHAB R&B

## 2024-02-01 PROCEDURE — 97110 THERAPEUTIC EXERCISES: CPT

## 2024-02-01 PROCEDURE — 97530 THERAPEUTIC ACTIVITIES: CPT

## 2024-02-01 PROCEDURE — 82947 ASSAY GLUCOSE BLOOD QUANT: CPT

## 2024-02-01 PROCEDURE — 97535 SELF CARE MNGMENT TRAINING: CPT

## 2024-02-01 PROCEDURE — 73610 X-RAY EXAM OF ANKLE: CPT

## 2024-02-01 PROCEDURE — 6370000000 HC RX 637 (ALT 250 FOR IP): Performed by: NURSE PRACTITIONER

## 2024-02-01 PROCEDURE — 85014 HEMATOCRIT: CPT

## 2024-02-01 RX ADMIN — ATORVASTATIN CALCIUM 10 MG: 10 TABLET, FILM COATED ORAL at 20:43

## 2024-02-01 RX ADMIN — PANTOPRAZOLE SODIUM 40 MG: 40 TABLET, DELAYED RELEASE ORAL at 06:49

## 2024-02-01 RX ADMIN — RANOLAZINE 500 MG: 500 TABLET, EXTENDED RELEASE ORAL at 08:53

## 2024-02-01 RX ADMIN — GLIPIZIDE 5 MG: 10 TABLET ORAL at 06:49

## 2024-02-01 RX ADMIN — HYDROCODONE BITARTRATE AND ACETAMINOPHEN 1 TABLET: 5; 325 TABLET ORAL at 02:58

## 2024-02-01 RX ADMIN — RIVAROXABAN 10 MG: 10 TABLET, FILM COATED ORAL at 20:42

## 2024-02-01 RX ADMIN — HYDROCODONE BITARTRATE AND ACETAMINOPHEN 2 TABLET: 5; 325 TABLET ORAL at 08:53

## 2024-02-01 RX ADMIN — CLOPIDOGREL BISULFATE 75 MG: 75 TABLET ORAL at 08:53

## 2024-02-01 RX ADMIN — INSULIN GLARGINE 10 UNITS: 100 INJECTION, SOLUTION SUBCUTANEOUS at 20:43

## 2024-02-01 RX ADMIN — POLYETHYLENE GLYCOL 3350 17 G: 17 POWDER, FOR SOLUTION ORAL at 08:55

## 2024-02-01 RX ADMIN — METOPROLOL SUCCINATE 12.5 MG: 25 TABLET, EXTENDED RELEASE ORAL at 20:41

## 2024-02-01 RX ADMIN — ACETAMINOPHEN 650 MG: 325 TABLET, FILM COATED ORAL at 01:54

## 2024-02-01 RX ADMIN — NITROFURANTOIN MONOHYDRATE/MACROCRYSTALS 100 MG: 75; 25 CAPSULE ORAL at 08:53

## 2024-02-01 RX ADMIN — PRIMIDONE 50 MG: 50 TABLET ORAL at 08:53

## 2024-02-01 RX ADMIN — SENNOSIDES 17.2 MG: 8.6 TABLET, FILM COATED ORAL at 20:42

## 2024-02-01 RX ADMIN — NITROFURANTOIN MONOHYDRATE/MACROCRYSTALS 100 MG: 75; 25 CAPSULE ORAL at 20:44

## 2024-02-01 RX ADMIN — PRIMIDONE 50 MG: 50 TABLET ORAL at 20:47

## 2024-02-01 RX ADMIN — ROPINIROLE HYDROCHLORIDE 4 MG: 1 TABLET, FILM COATED ORAL at 20:41

## 2024-02-01 RX ADMIN — LINACLOTIDE 72 MCG: 72 CAPSULE, GELATIN COATED ORAL at 06:49

## 2024-02-01 RX ADMIN — ACETAMINOPHEN 650 MG: 325 TABLET, FILM COATED ORAL at 12:28

## 2024-02-01 RX ADMIN — RANOLAZINE 500 MG: 500 TABLET, EXTENDED RELEASE ORAL at 20:47

## 2024-02-01 RX ADMIN — ACETAMINOPHEN 650 MG: 325 TABLET, FILM COATED ORAL at 17:35

## 2024-02-01 RX ADMIN — ACETAMINOPHEN 650 MG: 325 TABLET, FILM COATED ORAL at 08:55

## 2024-02-01 ASSESSMENT — PAIN SCALES - GENERAL
PAINLEVEL_OUTOF10: 5
PAINLEVEL_OUTOF10: 5
PAINLEVEL_OUTOF10: 8

## 2024-02-01 ASSESSMENT — PAIN DESCRIPTION - DESCRIPTORS
DESCRIPTORS: BURNING;THROBBING
DESCRIPTORS: ACHING;THROBBING

## 2024-02-01 ASSESSMENT — PAIN DESCRIPTION - LOCATION
LOCATION: HIP;LEG
LOCATION: LEG;BUTTOCKS

## 2024-02-01 ASSESSMENT — PAIN DESCRIPTION - ORIENTATION: ORIENTATION: LEFT

## 2024-02-01 NOTE — CONSENT
Informed Consent for Blood Component Transfusion Note    I have discussed with the  patient  the rationale for blood component transfusion; its benefits in treating or preventing fatigue, organ damage, or death; and its risk which includes mild transfusion reactions, rare risk of blood borne infection, or more serious but rare reactions. I have discussed the alternatives to transfusion, including the risk and consequences of not receiving transfusion. The patient had an opportunity to ask questions and had agreed to proceed with transfusion of blood components.    Electronically signed by Jon Bonner MD on 2/1/24 at 1:56 PM EST

## 2024-02-01 NOTE — PROGRESS NOTES
Physical Therapy  Facility/Department: [facility] ACUTE REHAB   Physical Therapy     NAME: Komal Crawford  : 1940 (83 y.o.)  MRN: [id number]  CODE STATUS: Full Code    Date of Service: 24      Past Medical History:   Diagnosis Date    Age-related cognitive decline     Ankle pain     Left ankle fracture in ortho boat.    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    Caffeine use     2 coffee/day, 1-2 tea per week    Cerebral artery occlusion with cerebral infarction (HCC)     Chronic back pain     Chronic ITP (idiopathic thrombocytopenia) (HCC)     CVA (cerebral infarction) ,     balance issues, short term memory loss    Diabetic gastroparesis (HCC)     Diverticular disease     GERD (gastroesophageal reflux disease)     Hiatal hernia     Hip fracture (HCC)     History of blood transfusion     History of decreased platelet count     Hyperlipemia     Hypertension     Internal hemorrhoid     Macular degeneration of left eye     S/P laser treatment    Nausea and vomiting     Neuropathy     Osteoarthritis     Ringing in ears     Self-catheterizes urinary bladder     6-7 times daily    TIA (transient ischemic attack) 2000    x1    Tubular adenoma     colon polyps    Type II or unspecified type diabetes mellitus without mention of complication, not stated as uncontrolled     Urinary incontinence     frequent UTI s/p infection, surgical dilatation lead to incontinence    Wears dentures     full on top, partial on bottom    Wears glasses      Past Surgical History:   Procedure Laterality Date    APPENDECTOMY      BLADDER SURGERY      bladder stimulator    BLADDER SUSPENSION      multiple    CARDIAC CATHETERIZATION      no stents    CARDIOVASCULAR STRESS TEST  2014    CATARACT REMOVAL WITH IMPLANT Left 2017    Tyree/Sulaiman    CATARACT REMOVAL WITH IMPLANT Right 2017

## 2024-02-01 NOTE — PROGRESS NOTES
Physical Medicine & Rehabilitation  Progress Note    2/1/2024 12:37 PM     CC: Ambulatory and ADL dysfunction due to left total hip arthroplasty left distal femur fracture with status post total knee arthroplasty revision    Subjective:   Pain controlled.  No significant depression.  Talked with her .  Declined psych consult.  Has Vianey, positive BM, notes some left ankle pain    ROS:  Denies fevers, chills, sweats.  No chest pain, palpitations, lightheadedness.  Denies coughing, wheezing or shortness of breath.  Denies abdominal pain, nausea, diarrhea or constipation.  No new areas of joint pain.  Denies new areas of numbness or weakness.  Denies new anxiety or depression issues.  No new skin problems.    Rehabilitation:   PT:  Restrictions/Precautions: Fall Risk, Weight Bearing, Contact Precautions, Bed Alarm, Up as Tolerated  Implants present? : Metal implants  Hip Precautions: Posterior hip precautions  Other position/activity restrictions: Pt had a (L) ISHAN  by Dr. JORDYN Bonner on 1-. Pt had surgery on 1- due to a Periprosthetic fracture of femur at tip of prosthesis for a (L) TKA revision by Dr. AMEE Keyes.  Right Lower Extremity Weight Bearing: Weight Bearing As Tolerated  Left Lower Extremity Weight Bearing: Weight Bearing As Tolerated   Transfers  Sit to Stand: Maximum Assistance  Stand to Sit: Maximum Assistance  Bed to Chair: Dependent/Total (using Magda Stey.)  Comment: N/A -attempted sit to stand transfers x 2/unable even with the bed height increased.  WB Status: (L) LE WBAT (Pt not able stand long enough to attempt gait.)  Ambulation  Device:  (Pt not able to ambulate, Magda Stehayde used for pregait activity.)  Comments: Not ready at this time.      OT:  ADL  Feeding: Setup  Grooming: Setup  Grooming Skilled Clinical Factors: in w/c up to sink, able to complete denture care ; soak and brush.  UE Bathing: Setup  LE Bathing: Dependent/Total  UE Dressing: Minimal assistance  UE Dressing Skilled  Transfers  Equipment Used: Grab bars  Toilet Transfer: Dependent/Total  Toilet Transfers Comments: transfer to toilet:  max- TA sit to stand from EOB, marisol stedy transfer. TA x 2 stand to sit. TA x 3 sit to stand from toilet. transfer to w/c with marisol sanchez.               ST:        Objective:  /60   Pulse 75   Temp 98.2 °F (36.8 °C) (Oral)   Resp 18   Ht 1.44 m (4' 8.69\")   Wt 73.6 kg (162 lb 4.8 oz)   SpO2 95%   BMI 35.50 kg/m²  I Body mass index is 35.5 kg/m². I   Wt Readings from Last 1 Encounters:   24 73.6 kg (162 lb 4.8 oz)      Temp (24hrs), Av.2 °F (36.8 °C), Min:98.2 °F (36.8 °C), Max:98.2 °F (36.8 °C)         GEN: well developed, well nourished, no acute distress  HEENT: Normocephalic atraumatic, EOMI, mucous membranes pink and moist  CV: RRR, no murmurs, rubs or gallops  PULM: CTAB, no rales or rhonchi. Respirations WNL and unlabored  ABD: soft, NT, ND, +BS and equal  NEURO: A&O x3. Sensation intact to light touch.   MSK: 4+/5 upper extremities, right lower extremity 4+/5 distally, left lower extremity antigravity distally unable to straight leg raise  EXTREMITIES: No calf tenderness to palpation bilaterally.  2+ plus edema left lower   SKIN: warm dry and intact with good turgor, left hip incision, site appears clean -, incision clean  , left knee/hip with Aquacel-minimal drainage-no change   PSYCH: appropriately interactive. Affect WNL.          Medications   Scheduled Meds:   senna  2 tablet Oral Nightly    psyllium  1 packet Oral Daily    insulin glargine  10 Units SubCUTAneous Nightly    linaCLOtide  72 mcg Oral QAM AC    nitrofurantoin (macrocrystal-monohydrate)  100 mg Oral 2 times per day    insulin lispro  0-8 Units SubCUTAneous TID WC    insulin lispro  0-4 Units SubCUTAneous Nightly    atorvastatin  10 mg Oral Nightly    pantoprazole  40 mg Oral QAM AC    [Held by provider] isosorbide mononitrate  30 mg Oral QPM    metoprolol succinate  12.5 mg Oral QPM

## 2024-02-01 NOTE — PROGRESS NOTES
[facility]   IN-PATIENT SERVICE   [facility]  Consult             Date:   2/1/2024  Patient name:  Komal Carwford  Date of admission:  1/26/2024  5:17 PM  MRN:   [id number]  Account:  [id number]  YOB: 1940  PCP:    Sabiha Bedolla MD  Room:   [address] [place] [address]  Code Status:    Full Code    Chief Complaint:     No chief complaint on file.      History Obtained From:     patient    History of Present Illness:     83-year-old female recently discharged from ARU, presenting with fall left hip pain chronic sciatica, x-ray shows no fracture severe osteoarthritis left hip  History of T2DM HTN CAD status post stent 2022, chronic thrombocytopenia chronic anemia, recurrent UTI neurogenic bladder straight cath at home  Orthopedics consulted due for left hip MRI and lumbar MRI done, impacted fracture left femoral neck, status post repair on January 20, 2024, distal comminuted fracture left femur, status post surgery on January 23, aspirin Plavix was on hold,  Aspirin Plavix and Xarelto was on hold due to hemoglobin drop below 7, given unit of blood, hemoglobin came back to 8.4,,  Rechecked this morning on January 26 is 8.4 as well,  Platelet count went up from 1 33-1 49 today,  So far looking very good,  Patient will be discharged to acute rehab, accepted,  Will restart Plavix and Xarelto if okay with orthopedic surgery at this time, will hold aspirin until patient is on Xarelto,  Can go back to aspirin and Plavix once the Xarelto is off as per orthopedic surgery,    Patient now admitted at [facility] acute re[facility] for further care        Past Medical History:     Past Medical History:   Diagnosis Date    Age-related cognitive decline     Ankle pain     Left ankle fracture in ortho boat.    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HCC) 1990    left with urinary incontinenc post surgical    Caffeine use     2 coffee/day, 1-2 tea per week    Cerebral  artery occlusion with cerebral infarction (HCC)     Chronic back pain     Chronic ITP (idiopathic thrombocytopenia) (HCC)     CVA (cerebral infarction) 2008, 2010    balance issues, short term memory loss    Diabetic gastroparesis (HCC)     Diverticular disease 1986    GERD (gastroesophageal reflux disease)     Hiatal hernia     Hip fracture (HCC)     History of blood transfusion     History of decreased platelet count     Hyperlipemia     Hypertension     Internal hemorrhoid     Macular degeneration of left eye 1980's    S/P laser treatment    Nausea and vomiting     Neuropathy     Osteoarthritis     Ringing in ears     Self-catheterizes urinary bladder     6-7 times daily    TIA (transient ischemic attack) 2000    x1    Tubular adenoma     colon polyps    Type II or unspecified type diabetes mellitus without mention of complication, not stated as uncontrolled     Urinary incontinence     frequent UTI s/p infection, surgical dilatation lead to incontinence    Wears dentures     full on top, partial on bottom    Wears glasses         Past Surgical History:     Past Surgical History:   Procedure Laterality Date    APPENDECTOMY  1962    BLADDER SURGERY      bladder stimulator    BLADDER SUSPENSION  2002    multiple    CARDIAC CATHETERIZATION  2008    no stents    CARDIOVASCULAR STRESS TEST  12/2014    CATARACT REMOVAL WITH IMPLANT Left 11/07/2017    Tyree/Sulaiman    CATARACT REMOVAL WITH IMPLANT Right 11/28/2017    Rafshanique/Sulaiman    COLON SURGERY      colostomy reversal    COLONOSCOPY  04/07/2016    tubular adenoma x3; internal hemorrhoids    COLONOSCOPY N/A 11/06/2019    COLONOSCOPY DIAGNOSTIC performed by Eli Marinelli MD at [facility]    COLONOSCOPY  11/06/2019    COLOSTOMY  1986    bowel obstruction/ rupture from diverticular disease, reversal 3 mos later    CORONARY ANGIOPLASTY WITH STENT PLACEMENT  08/04/2022    CYSTOSCOPY  09/08/2017    W/ 200IU Botox     FRACTURE SURGERY Right 2011    hip

## 2024-02-01 NOTE — PROGRESS NOTES
[facility] Occupational Therapy Daily Treatment Note    Date: 24  Patient Name: Komal Crawford       Room: 2608/2608-01  MRN: [id number]  Account: [id number]   : 1940  (83 y.o.) Gender: female          Diagnosis: Admitted with left femoral neck and shane-prosthetic femur fracture post fall,  L THR 24.   left hip fracture s/p total hip arthroplasty 24. , left distal femur fracture s/p total knee arthroplasty revision 24.  Additional Pertinent Hx: Expand All Collapse All    Physical Medicine & Rehabilitation History and Physical  [facility]      Primary care provider:  Sabiha Bedolla MD      Chief Complaint and Reason for Rehabilitation Admission:   ADL and mobility deficits secondary to left hip fracture s/p total hip arthroplasty, left distal femur fracture s/p total knee arthroplasty revision     History of Present Illness:  Komal Crawford is a 83 y.o. 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  She was originally admitted to [facility] on 24.     She initially presented after a mechanical fall when attempting to  her dog and her left leg gave out.  Denied hitting her head.  Initial x-rays did not show any pelvic or hip fractures but did show moderate to severe osteoarthritis of the left hip.  MRI left hip showed suspected posttraumatic marrow edema and probably impaction fracture at the superolateral left femoral head.  She was also found to have comminuted impacted fracture of the left distal femur.  She underwent left total hip arthroplasty on 24 (Dr. Bonner) and left total knee arthroplasty revision on 24 (Dr. Keyes).  She is WBAT to the left lower limb with  10/27/2015); Colonoscopy (04/07/2016); Upper gastrointestinal endoscopy (04/07/2016); lumbar fusion (2017); Cystocopy (09/08/2017); Cataract removal with implant (Left, 11/07/2017); pr xcapsl ctrc rmvl insj io lens prosth w/o ecp (Left, 11/07/2017); Cataract removal with implant (Right, 11/28/2017); pr xcapsl ctrc rmvl insj io lens prosth w/o ecp (Right, 11/28/2017); Upper gastrointestinal endoscopy (N/A, 07/17/2018); joint replacement (Bilateral, 2000); hernia repair; Colonoscopy (N/A, 11/06/2019); Revision total knee arthroplasty (Left, 07/14/2020); Colonoscopy (11/06/2019); Coronary angioplasty with stent (08/04/2022); Total hip arthroplasty (Right, 11/6/2023); hip surgery (Right, 11/6/2023); Total hip arthroplasty (Left, 1/20/2024); and Revision total knee arthroplasty (Left, 1/23/2024).    Restrictions  Restrictions/Precautions  Restrictions/Precautions: Fall Risk, Weight Bearing, Contact Precautions, Bed Alarm, Up as Tolerated  Required Braces or Orthoses?: No  Implants present? : Metal implants     Position Activity Restriction  Hip Precautions: Posterior hip precautions  Other position/activity restrictions: Pt had a (L) ISHAN  by Dr. JORDYN Bonner on 1-. Pt had surgery on 1- due to a Periprosthetic fracture of femur at tip of prosthesis for a (L) TKA revision by Dr. AMEE Keyes.  Lower Extremity Weight Bearing Restrictions  Right Lower Extremity Weight Bearing: Weight Bearing As Tolerated  Left Lower Extremity Weight Bearing: Weight Bearing As Tolerated              Subjective  Subjective  Subjective: \"I hope they can help me walk again.\"  Pain: 5/10 LLE.  RNHazel cleared pt for therapy and mobility today.  She will check with MD re antifungal powder for pt's groin.       Objective  Cognition  Overall Orientation Status: Within Functional Limits       Cognition  Overall Cognitive Status: WFL  Cognition Comment: good cognition demo today    Activities of Daily Living  Feeding  Assistance Level:

## 2024-02-02 LAB
ABO/RH: NORMAL
ANTIBODY SCREEN: NEGATIVE
ARM BAND NUMBER: NORMAL
BLOOD BANK BLOOD PRODUCT EXPIRATION DATE: NORMAL
BLOOD BANK DISPENSE STATUS: NORMAL
BLOOD BANK ISBT PRODUCT BLOOD TYPE: 5100
BLOOD BANK PRODUCT CODE: NORMAL
BLOOD BANK SAMPLE EXPIRATION: NORMAL
BLOOD BANK UNIT TYPE AND RH: NORMAL
BPU ID: NORMAL
COMPONENT: NORMAL
CROSSMATCH RESULT: NORMAL
ERYTHROCYTE [DISTWIDTH] IN BLOOD BY AUTOMATED COUNT: 17.9 % (ref 11.5–14.9)
GLUCOSE BLD-MCNC: 103 MG/DL (ref 65–105)
GLUCOSE BLD-MCNC: 143 MG/DL (ref 65–105)
GLUCOSE BLD-MCNC: 198 MG/DL (ref 65–105)
GLUCOSE BLD-MCNC: 199 MG/DL (ref 65–105)
HCT VFR BLD AUTO: 30.3 % (ref 36–46)
HGB BLD-MCNC: 9.9 G/DL (ref 12–16)
MCH RBC QN AUTO: 30 PG (ref 26–34)
MCHC RBC AUTO-ENTMCNC: 32.8 G/DL (ref 31–37)
MCV RBC AUTO: 91.4 FL (ref 80–100)
PLATELET # BLD AUTO: 336 K/UL (ref 150–450)
PMV BLD AUTO: 11.4 FL (ref 6–12)
RBC # BLD AUTO: 3.32 M/UL (ref 4–5.2)
TRANSFUSION STATUS: NORMAL
UNIT DIVISION: 0
UNIT ISSUE DATE/TIME: NORMAL
WBC OTHER # BLD: 9.3 K/UL (ref 3.5–11)

## 2024-02-02 PROCEDURE — 99232 SBSQ HOSP IP/OBS MODERATE 35: CPT | Performed by: INTERNAL MEDICINE

## 2024-02-02 PROCEDURE — 97530 THERAPEUTIC ACTIVITIES: CPT

## 2024-02-02 PROCEDURE — 85027 COMPLETE CBC AUTOMATED: CPT

## 2024-02-02 PROCEDURE — 97535 SELF CARE MNGMENT TRAINING: CPT

## 2024-02-02 PROCEDURE — 97542 WHEELCHAIR MNGMENT TRAINING: CPT

## 2024-02-02 PROCEDURE — 99232 SBSQ HOSP IP/OBS MODERATE 35: CPT | Performed by: PHYSICAL MEDICINE & REHABILITATION

## 2024-02-02 PROCEDURE — 6370000000 HC RX 637 (ALT 250 FOR IP): Performed by: INTERNAL MEDICINE

## 2024-02-02 PROCEDURE — 6370000000 HC RX 637 (ALT 250 FOR IP): Performed by: NURSE PRACTITIONER

## 2024-02-02 PROCEDURE — 1180000000 HC REHAB R&B

## 2024-02-02 PROCEDURE — 36415 COLL VENOUS BLD VENIPUNCTURE: CPT

## 2024-02-02 PROCEDURE — 97110 THERAPEUTIC EXERCISES: CPT

## 2024-02-02 PROCEDURE — 82947 ASSAY GLUCOSE BLOOD QUANT: CPT

## 2024-02-02 RX ORDER — SENNOSIDES A AND B 8.6 MG/1
2 TABLET, FILM COATED ORAL NIGHTLY PRN
Status: DISCONTINUED | OUTPATIENT
Start: 2024-02-02 | End: 2024-02-12 | Stop reason: HOSPADM

## 2024-02-02 RX ADMIN — ACETAMINOPHEN 650 MG: 325 TABLET, FILM COATED ORAL at 11:57

## 2024-02-02 RX ADMIN — PRIMIDONE 50 MG: 50 TABLET ORAL at 07:49

## 2024-02-02 RX ADMIN — HYDROCODONE BITARTRATE AND ACETAMINOPHEN 1 TABLET: 5; 325 TABLET ORAL at 02:49

## 2024-02-02 RX ADMIN — NITROFURANTOIN MONOHYDRATE/MACROCRYSTALS 100 MG: 75; 25 CAPSULE ORAL at 22:20

## 2024-02-02 RX ADMIN — METOPROLOL SUCCINATE 12.5 MG: 25 TABLET, EXTENDED RELEASE ORAL at 22:26

## 2024-02-02 RX ADMIN — ACETAMINOPHEN 650 MG: 325 TABLET, FILM COATED ORAL at 17:36

## 2024-02-02 RX ADMIN — RIVAROXABAN 10 MG: 10 TABLET, FILM COATED ORAL at 22:26

## 2024-02-02 RX ADMIN — ROPINIROLE HYDROCHLORIDE 4 MG: 1 TABLET, FILM COATED ORAL at 22:18

## 2024-02-02 RX ADMIN — ACETAMINOPHEN 650 MG: 325 TABLET, FILM COATED ORAL at 05:49

## 2024-02-02 RX ADMIN — RANOLAZINE 500 MG: 500 TABLET, EXTENDED RELEASE ORAL at 22:20

## 2024-02-02 RX ADMIN — RANOLAZINE 500 MG: 500 TABLET, EXTENDED RELEASE ORAL at 07:44

## 2024-02-02 RX ADMIN — GLIPIZIDE 5 MG: 10 TABLET ORAL at 05:49

## 2024-02-02 RX ADMIN — CLOPIDOGREL BISULFATE 75 MG: 75 TABLET ORAL at 07:44

## 2024-02-02 RX ADMIN — INSULIN GLARGINE 10 UNITS: 100 INJECTION, SOLUTION SUBCUTANEOUS at 22:19

## 2024-02-02 RX ADMIN — POLYETHYLENE GLYCOL 3350 17 G: 17 POWDER, FOR SOLUTION ORAL at 07:44

## 2024-02-02 RX ADMIN — TIZANIDINE 2 MG: 2 TABLET ORAL at 22:26

## 2024-02-02 RX ADMIN — PRIMIDONE 50 MG: 50 TABLET ORAL at 22:20

## 2024-02-02 RX ADMIN — NITROFURANTOIN MONOHYDRATE/MACROCRYSTALS 100 MG: 75; 25 CAPSULE ORAL at 07:49

## 2024-02-02 RX ADMIN — LINACLOTIDE 72 MCG: 72 CAPSULE, GELATIN COATED ORAL at 05:50

## 2024-02-02 RX ADMIN — HYDROCODONE BITARTRATE AND ACETAMINOPHEN 1 TABLET: 5; 325 TABLET ORAL at 22:25

## 2024-02-02 RX ADMIN — ATORVASTATIN CALCIUM 10 MG: 10 TABLET, FILM COATED ORAL at 22:18

## 2024-02-02 RX ADMIN — PANTOPRAZOLE SODIUM 40 MG: 40 TABLET, DELAYED RELEASE ORAL at 05:49

## 2024-02-02 ASSESSMENT — PAIN DESCRIPTION - ORIENTATION
ORIENTATION: LEFT

## 2024-02-02 ASSESSMENT — PAIN DESCRIPTION - LOCATION
LOCATION: HIP
LOCATION: LEG
LOCATION: LEG

## 2024-02-02 ASSESSMENT — PAIN SCALES - GENERAL
PAINLEVEL_OUTOF10: 3
PAINLEVEL_OUTOF10: 2
PAINLEVEL_OUTOF10: 1
PAINLEVEL_OUTOF10: 8
PAINLEVEL_OUTOF10: 7
PAINLEVEL_OUTOF10: 2
PAINLEVEL_OUTOF10: 3

## 2024-02-02 ASSESSMENT — PAIN DESCRIPTION - DESCRIPTORS
DESCRIPTORS: ACHING

## 2024-02-02 ASSESSMENT — PAIN - FUNCTIONAL ASSESSMENT
PAIN_FUNCTIONAL_ASSESSMENT: ACTIVITIES ARE NOT PREVENTED

## 2024-02-02 ASSESSMENT — PAIN SCALES - WONG BAKER: WONGBAKER_NUMERICALRESPONSE: HURTS WHOLE LOT

## 2024-02-02 NOTE — PLAN OF CARE
Problem: Discharge Planning  Goal: Discharge to home or other facility with appropriate resources  2/2/2024 1349 by Lyubov Grimes LPN  Outcome: Progressing  Flowsheets (Taken 2/2/2024 1336)  Discharge to home or other facility with appropriate resources:   Identify barriers to discharge with patient and caregiver   Arrange for needed discharge resources and transportation as appropriate     Problem: Safety - Adult  Goal: Free from fall injury  2/2/2024 1349 by Lyubov Grimes LPN  Outcome: Progressing     Problem: ABCDS Injury Assessment  Goal: Absence of physical injury  2/2/2024 1349 by Lyubov Grimes LPN  Outcome: Progressing  Flowsheets (Taken 2/2/2024 1348)  Absence of Physical Injury: Implement safety measures based on patient assessment     Problem: Skin/Tissue Integrity  Goal: Absence of new skin breakdown  Description: 1.  Monitor for areas of redness and/or skin breakdown  2.  Assess vascular access sites hourly  3.  Every 4-6 hours minimum:  Change oxygen saturation probe site  4.  Every 4-6 hours:  If on nasal continuous positive airway pressure, respiratory therapy assess nares and determine need for appliance change or resting period.  2/2/2024 1349 by Lyubov Grimes LPN  Outcome: Progressing     Problem: Chronic Conditions and Co-morbidities  Goal: Patient's chronic conditions and co-morbidity symptoms are monitored and maintained or improved  2/2/2024 1349 by Lyubov Grimes LPN  Outcome: Progressing  Flowsheets (Taken 2/2/2024 1336)  Care Plan - Patient's Chronic Conditions and Co-Morbidity Symptoms are Monitored and Maintained or Improved:   Monitor and assess patient's chronic conditions and comorbid symptoms for stability, deterioration, or improvement   Collaborate with multidisciplinary team to address chronic and comorbid conditions and prevent exacerbation or deterioration   Update acute care plan with appropriate goals if chronic or comorbid symptoms are exacerbated and prevent

## 2024-02-02 NOTE — PROGRESS NOTES
Physical Medicine & Rehabilitation  Progress Note    2/2/2024 12:54 PM     CC: Ambulatory and ADL dysfunction due to left total hip arthroplasty left distal femur fracture with status post total knee arthroplasty revision    Subjective:   Pain controlled.  Seen in gym better spirits.  Notes some decreased sleep.  Notes increased bowel movements-would like to DC Metamucil, MiraLAX as needed.    ROS:  Denies fevers, chills, sweats.  No chest pain, palpitations, lightheadedness.  Denies coughing, wheezing or shortness of breath.  Denies abdominal pain, nausea, diarrhea or constipation.  No new areas of joint pain.  Denies new areas of numbness or weakness.  Denies new anxiety or depression issues.  No new skin problems.    Rehabilitation:   PT:  Restrictions/Precautions: Fall Risk, Weight Bearing, Contact Precautions, Bed Alarm, Up as Tolerated  Implants present? : Metal implants  Hip Precautions: Posterior hip precautions  Other position/activity restrictions: Pt had a (L) ISHAN  by Dr. JORDYN Bonner on 1-. Pt had surgery on 1- due to a Periprosthetic fracture of femur at tip of prosthesis for a (L) TKA revision by Dr. AMEE Keyes.  Right Lower Extremity Weight Bearing: Weight Bearing As Tolerated  Left Lower Extremity Weight Bearing: Weight Bearing As Tolerated   Transfers  Sit to Stand: Maximum Assistance  Stand to Sit: Maximum Assistance  Bed to Chair: Dependent/Total (using Magda Stey.)  Comment: N/A -attempted sit to stand transfers x 2/unable even with the bed height increased.  WB Status: (L) LE WBAT (Pt not able stand long enough to attempt gait.)  Ambulation  Device:  (Pt not able to ambulate, Magda Stedy used for pregait activity.)  Comments: Not ready at this time.      OT:  ADL  Feeding: Setup  Grooming: Setup  Grooming Skilled Clinical Factors: in w/c up to sink, able to complete denture care ; soak and brush.  UE Bathing: Setup  LE Bathing: Dependent/Total  UE Dressing: Minimal assistance  UE Dressing  02/02/24  0553 02/02/24  1105   POCGLU 103 151* 143* 103         No results for input(s): \"CLARITYU\", \"COLORU\", \"PHUR\", \"SPECGRAV\", \"PROTEINU\", \"RBCUA\", \"BLOODU\", \"BACTERIA\", \"NITRU\", \"WBCUA\", \"LEUKOCYTESUR\", \"YEAST\", \"GLUCOSEU\", \"BILIRUBINUR\" in the last 72 hours.    XR ABDOMEN (KUB) (SINGLE AP VIEW)    Result Date: 1/29/2024  Prominent amount of stool throughout the abdomen and correlation with symptomatology for constipation is needed.       XR ANKLE LEFT (MIN 3 VIEWS)    Result Date: 2/1/2024  No acute abnormality of the ankle.       Vascular duplex lower extremity venous left [[id number]]    Resulted: 02/01/24 0243    Updated: 02/01/24 0245     Body Surface Area 1.74 m2   Narrative:     Technically limited study as stated however in the visualized fields the  left lower extremity is without evidence of deep or superficial venous  thrombosis.         Impression/Plan:   Impaired ADLs, gait, and mobility due to:     Left hip fracture:  S/p total hip arthroplasty on 1/20/24 (Dr. Bonner).  PT/OT for gait, mobility, strengthening, endurance, ADLs, and self care.  WBAT to the left lower limb, posterior hip precautions.,  Aquacel left hip and knee  Pain control with scheduled tylenol, as-needed tizanidine, as-needed norco.-Pt Minimize use of Norco monitor for Tylenol use if increased may need to change to oxycodone,  discussed switching Norco to oxycodone-patient currently not sure she wants to change - will check LFTs in a.m. and review with patient again  Left distal femur fracture:  S/p total knee arthroplasty revision on 1/23/24 (Dr. Keyes).  WBAT to the left lower limb.  Pain control as above.  Left ankle pain-x-ray as above no acute, suspect strain if continues consider  brace, symptoms better today, x-ray negative  Acute blood loss anemia:  Hemoglobin 6.8 in a.m., received transfusion improved to 7.4--Xarelto on hold and continues with Plavix, follow-up hemoglobins appear to be stable around 10.6.  Internal

## 2024-02-02 NOTE — PROGRESS NOTES
Physical Therapy  Facility/Department: [facility]      NAME: Komal Crawford  : 1940 (83 y.o.)  MRN: [id number]  CODE STATUS: Full Code    Date of Service: 24      Past Medical History:   Diagnosis Date    Age-related cognitive decline     Ankle pain     Left ankle fracture in ortho boat.    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    Caffeine use     2 coffee/day, 1-2 tea per week    Cerebral artery occlusion with cerebral infarction (HCC)     Chronic back pain     Chronic ITP (idiopathic thrombocytopenia) (HCC)     CVA (cerebral infarction) ,     balance issues, short term memory loss    Diabetic gastroparesis (HCC)     Diverticular disease     GERD (gastroesophageal reflux disease)     Hiatal hernia     Hip fracture (HCC)     History of blood transfusion     History of decreased platelet count     Hyperlipemia     Hypertension     Internal hemorrhoid     Macular degeneration of left eye     S/P laser treatment    Nausea and vomiting     Neuropathy     Osteoarthritis     Ringing in ears     Self-catheterizes urinary bladder     6-7 times daily    TIA (transient ischemic attack) 2000    x1    Tubular adenoma     colon polyps    Type II or unspecified type diabetes mellitus without mention of complication, not stated as uncontrolled     Urinary incontinence     frequent UTI s/p infection, surgical dilatation lead to incontinence    Wears dentures     full on top, partial on bottom    Wears glasses      Past Surgical History:   Procedure Laterality Date    APPENDECTOMY      BLADDER SURGERY      bladder stimulator    BLADDER SUSPENSION      multiple    CARDIAC CATHETERIZATION      no stents    CARDIOVASCULAR STRESS TEST  2014    CATARACT REMOVAL WITH IMPLANT Left 2017    Tyree/Sulaiman    CATARACT REMOVAL WITH IMPLANT Right 2017    Tyree/Sulaiman

## 2024-02-02 NOTE — PROGRESS NOTES
PERRL, conjunctiva normal Imdur Lopressor    Neurogenic bladder, patient self caths at home, has history of benign spinal cord tumor s/p resection, patient on chronic nitrofurantoin therapy for recurrent UTIs, will resume    Hyperlipidemia on Lipitor    Type 2 diabetes mellitus, blood sugars has been stable on Lantus and sliding scale    Chronic thrombocytopenia secondary to ITP follows up with hematology oncology as outpatient    Anemia, likely acute anemia of blood loss secondary to postop  Hemoglobin was 8.7, no active bleeding present, continue to monitor    DVT prophylaxis Xarelto    1/289Patient seen and examined, complaining of constipation  On stool softeners  Vitals have been stable  Resumed on nitrofurantoin patient takes nitrofurantoin as suppressive therapy for recurrent UTIs next  Anemia of blood loss, continue to monitor no active bleeding present  On Xarelto and Plavix,   As per Ortho recommendations,  Resume aspirin when off Xarelto.    1/29  Patient seen and examined, continues to complain of pain at the surgical site  Blood sugars been stable  Blood pressure has been borderline lower side    Will DC amlodipine  Drop in hemoglobin hemoglobin of 7.7 likely postop    Needs CBC tomorrow  Get iron studies  On Plavix and Xarelto    1/30  Patient seen and examined  Patient was orthostatic this morning, hemoglobin dropped to 7.1, is been slowly dropping, likely postop hold on Xarelto, check stool occult  Blood sugar was in 70s this morning decrease Lantus to 10 units, very poor p.o. intake encouraged patient to increase p.o. intake  Continue to monitor H&H  Iron studies reviewed, transfuse if drops below 7.  Patient recently had stent placement in September, was on aspirin and Plavix, aspirin was DC'd since patient was started on Xarelto, continue Plavix for now  Hold onto Imdur, put ProAmatine as needed.  2/1    Patient seen and examined  Her vitals have been stable  Doppler lower extremity limited study

## 2024-02-02 NOTE — PROGRESS NOTES
Writer contacted Dr. Head via secured messaging regarding continuation of Linzess. Awaiting response.

## 2024-02-02 NOTE — PROGRESS NOTES
[facility] Occupational Therapy Daily Treatment Note    Date: 24  Patient Name: Komal Crawford       Room: 2608/2608-01  MRN: [id number]  Account: [id number]   : 1940  (83 y.o.) Gender: female          Diagnosis: Admitted with left femoral neck and shane-prosthetic femur fracture post fall,  L THR 24.   left hip fracture s/p total hip arthroplasty 24. , left distal femur fracture s/p total knee arthroplasty revision 24.  Additional Pertinent Hx: Expand All Collapse All    Physical Medicine & Rehabilitation History and Physical  [facility]      Primary care provider:  Sabiha Bedolla MD      Chief Complaint and Reason for Rehabilitation Admission:   ADL and mobility deficits secondary to left hip fracture s/p total hip arthroplasty, left distal femur fracture s/p total knee arthroplasty revision     History of Present Illness:  Komal Crawford is a 83 y.o. 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  She was originally admitted to [facility] on 24.     She initially presented after a mechanical fall when attempting to  her dog and her left leg gave out.  Denied hitting her head.  Initial x-rays did not show any pelvic or hip fractures but did show moderate to severe osteoarthritis of the left hip.  MRI left hip showed suspected posttraumatic marrow edema and probably impaction fracture at the superolateral left femoral head.  She was also found to have comminuted impacted fracture of the left distal femur.  She underwent left total hip arthroplasty on 24 (Dr. Bonner) and left total knee arthroplasty revision on 24 (Dr. Keyes).  She is WBAT to the left lower limb with

## 2024-02-02 NOTE — PROGRESS NOTES
Comprehensive Nutrition Assessment    Type and Reason for Visit:  Reassess    Nutrition Recommendations/Plan:   Continue current diet.   Continue oral nutrition supplememnts.      Malnutrition Assessment:  Malnutrition Status:  At risk for malnutrition (Comment) (01/27/24 1232)    Context:  Acute Illness     Findings of the 6 clinical characteristics of malnutrition:  Energy Intake:  No significant decrease in energy intake  Weight Loss:  No significant weight loss     Body Fat Loss:  Unable to assess     Muscle Mass Loss:  Unable to assess    Fluid Accumulation:  Mild Extremities   Strength:  Not Performed    Nutrition Assessment:    Pt states she has been eating ok. Was unable to eat lunch yesterday due not being in room for the meal, but states she ate a good dinner. She ate a donut right before lunch today and therefore may eat portion of lunch later. Cart reviewed. Diarrhea reported; bowel meds adjusted including hold on Linzess.    Nutrition Related Findings:    Edema: +1 RLE, +2 LLE. POC Glucose: 103-151 since 2/1. Other labs and meds reviewed. Wound Type: Pressure Injury, Surgical Incision, Multiple, Deep Tissue Injury       Current Nutrition Intake & Therapies:    Average Meal Intake: 51-75%, % (varies at time)  Average Supplements Intake: % (Most)  ADULT DIET; Regular; 4 carb choices (60 gm/meal)  ADULT ORAL NUTRITION SUPPLEMENT; Breakfast, Lunch, Dinner; Diabetic Oral Supplement    Anthropometric Measures:  Height: 144 cm (4' 8.69\")  Ideal Body Weight (IBW): 83 lbs (38 kg)    Admission Body Weight: 75.6 kg (166 lb 10.7 oz)  Current Body Weight: 73.6 kg (162 lb 4.1 oz), 200.8 % IBW. Weight Source: Bed Scale  Current BMI (kg/m2): 35.5  Usual Body Weight: 66.7 kg (147 lb 0.8 oz) (wt appears to vary)  % Weight Change (Calculated): 10.3                    BMI Categories: Obese Class 2 (BMI 35.0 -39.9)    Estimated Daily Nutrient Needs:  Energy Requirements Based On: Kcal/kg  Weight Used for

## 2024-02-03 DIAGNOSIS — B35.4 TINEA CORPORIS: ICD-10-CM

## 2024-02-03 DIAGNOSIS — R21 RASH: ICD-10-CM

## 2024-02-03 LAB
ALBUMIN SERPL-MCNC: 2.7 G/DL (ref 3.5–5.2)
ALP SERPL-CCNC: 106 U/L (ref 35–104)
ALT SERPL-CCNC: 10 U/L (ref 5–33)
ANION GAP SERPL CALCULATED.3IONS-SCNC: 12 MMOL/L (ref 9–17)
AST SERPL-CCNC: 13 U/L
BASOPHILS # BLD: 0.1 K/UL (ref 0–0.2)
BASOPHILS NFR BLD: 1 % (ref 0–2)
BILIRUB DIRECT SERPL-MCNC: 0.3 MG/DL
BILIRUB INDIRECT SERPL-MCNC: 0.5 MG/DL (ref 0–1)
BILIRUB SERPL-MCNC: 0.8 MG/DL (ref 0.3–1.2)
BUN SERPL-MCNC: 18 MG/DL (ref 8–23)
CALCIUM SERPL-MCNC: 8.4 MG/DL (ref 8.6–10.4)
CHLORIDE SERPL-SCNC: 101 MMOL/L (ref 98–107)
CO2 SERPL-SCNC: 21 MMOL/L (ref 20–31)
CREAT SERPL-MCNC: 0.7 MG/DL (ref 0.5–0.9)
EOSINOPHIL # BLD: 0.2 K/UL (ref 0–0.4)
EOSINOPHILS RELATIVE PERCENT: 3 % (ref 0–4)
ERYTHROCYTE [DISTWIDTH] IN BLOOD BY AUTOMATED COUNT: 17.5 % (ref 11.5–14.9)
GFR SERPL CREATININE-BSD FRML MDRD: >60 ML/MIN/1.73M2
GLUCOSE BLD-MCNC: 123 MG/DL (ref 65–105)
GLUCOSE BLD-MCNC: 125 MG/DL (ref 65–105)
GLUCOSE BLD-MCNC: 231 MG/DL (ref 65–105)
GLUCOSE BLD-MCNC: 269 MG/DL (ref 65–105)
GLUCOSE SERPL-MCNC: 125 MG/DL (ref 70–99)
HCT VFR BLD AUTO: 28.2 % (ref 36–46)
HGB BLD-MCNC: 9.3 G/DL (ref 12–16)
LYMPHOCYTES NFR BLD: 1 K/UL (ref 1–4.8)
LYMPHOCYTES RELATIVE PERCENT: 15 % (ref 24–44)
MCH RBC QN AUTO: 29.4 PG (ref 26–34)
MCHC RBC AUTO-ENTMCNC: 33.2 G/DL (ref 31–37)
MCV RBC AUTO: 88.8 FL (ref 80–100)
MONOCYTES NFR BLD: 0.7 K/UL (ref 0.1–1.3)
MONOCYTES NFR BLD: 11 % (ref 1–7)
NEUTROPHILS NFR BLD: 70 % (ref 36–66)
NEUTS SEG NFR BLD: 4.5 K/UL (ref 1.3–9.1)
PLATELET # BLD AUTO: 221 K/UL (ref 150–450)
PMV BLD AUTO: 11.6 FL (ref 6–12)
POTASSIUM SERPL-SCNC: 3.9 MMOL/L (ref 3.7–5.3)
PROT SERPL-MCNC: 5.4 G/DL (ref 6.4–8.3)
RBC # BLD AUTO: 3.17 M/UL (ref 4–5.2)
SODIUM SERPL-SCNC: 134 MMOL/L (ref 135–144)
WBC OTHER # BLD: 6.4 K/UL (ref 3.5–11)

## 2024-02-03 PROCEDURE — 80076 HEPATIC FUNCTION PANEL: CPT

## 2024-02-03 PROCEDURE — 97530 THERAPEUTIC ACTIVITIES: CPT

## 2024-02-03 PROCEDURE — 82947 ASSAY GLUCOSE BLOOD QUANT: CPT

## 2024-02-03 PROCEDURE — 1180000000 HC REHAB R&B

## 2024-02-03 PROCEDURE — 6370000000 HC RX 637 (ALT 250 FOR IP): Performed by: INTERNAL MEDICINE

## 2024-02-03 PROCEDURE — 97110 THERAPEUTIC EXERCISES: CPT

## 2024-02-03 PROCEDURE — 85025 COMPLETE CBC W/AUTO DIFF WBC: CPT

## 2024-02-03 PROCEDURE — 80048 BASIC METABOLIC PNL TOTAL CA: CPT

## 2024-02-03 PROCEDURE — 36415 COLL VENOUS BLD VENIPUNCTURE: CPT

## 2024-02-03 PROCEDURE — 97116 GAIT TRAINING THERAPY: CPT

## 2024-02-03 PROCEDURE — 99232 SBSQ HOSP IP/OBS MODERATE 35: CPT | Performed by: INTERNAL MEDICINE

## 2024-02-03 RX ADMIN — PRIMIDONE 50 MG: 50 TABLET ORAL at 20:29

## 2024-02-03 RX ADMIN — ACETAMINOPHEN 650 MG: 325 TABLET, FILM COATED ORAL at 05:45

## 2024-02-03 RX ADMIN — NITROFURANTOIN MONOHYDRATE/MACROCRYSTALS 100 MG: 75; 25 CAPSULE ORAL at 07:57

## 2024-02-03 RX ADMIN — PRIMIDONE 50 MG: 50 TABLET ORAL at 07:57

## 2024-02-03 RX ADMIN — PANTOPRAZOLE SODIUM 40 MG: 40 TABLET, DELAYED RELEASE ORAL at 05:45

## 2024-02-03 RX ADMIN — HYDROCODONE BITARTRATE AND ACETAMINOPHEN 1 TABLET: 5; 325 TABLET ORAL at 15:06

## 2024-02-03 RX ADMIN — ACETAMINOPHEN 650 MG: 325 TABLET, FILM COATED ORAL at 12:02

## 2024-02-03 RX ADMIN — ATORVASTATIN CALCIUM 10 MG: 10 TABLET, FILM COATED ORAL at 20:14

## 2024-02-03 RX ADMIN — INSULIN LISPRO 4 UNITS: 100 INJECTION, SOLUTION INTRAVENOUS; SUBCUTANEOUS at 17:06

## 2024-02-03 RX ADMIN — TIZANIDINE 2 MG: 2 TABLET ORAL at 07:57

## 2024-02-03 RX ADMIN — CLOPIDOGREL BISULFATE 75 MG: 75 TABLET ORAL at 07:57

## 2024-02-03 RX ADMIN — RIVAROXABAN 10 MG: 10 TABLET, FILM COATED ORAL at 20:14

## 2024-02-03 RX ADMIN — RANOLAZINE 500 MG: 500 TABLET, EXTENDED RELEASE ORAL at 20:14

## 2024-02-03 RX ADMIN — ACETAMINOPHEN 650 MG: 325 TABLET, FILM COATED ORAL at 17:07

## 2024-02-03 RX ADMIN — INSULIN GLARGINE 10 UNITS: 100 INJECTION, SOLUTION SUBCUTANEOUS at 20:28

## 2024-02-03 RX ADMIN — RANOLAZINE 500 MG: 500 TABLET, EXTENDED RELEASE ORAL at 07:57

## 2024-02-03 RX ADMIN — ROPINIROLE HYDROCHLORIDE 4 MG: 1 TABLET, FILM COATED ORAL at 20:29

## 2024-02-03 RX ADMIN — ACETAMINOPHEN 650 MG: 325 TABLET, FILM COATED ORAL at 23:24

## 2024-02-03 RX ADMIN — GLIPIZIDE 5 MG: 10 TABLET ORAL at 05:45

## 2024-02-03 ASSESSMENT — PAIN DESCRIPTION - DESCRIPTORS: DESCRIPTORS: ACHING;SHARP;SORE

## 2024-02-03 ASSESSMENT — PAIN DESCRIPTION - LOCATION
LOCATION: KNEE
LOCATION: LEG;HIP
LOCATION: BUTTOCKS
LOCATION: LEG;KNEE
LOCATION: LEG;KNEE

## 2024-02-03 ASSESSMENT — PAIN SCALES - GENERAL
PAINLEVEL_OUTOF10: 4
PAINLEVEL_OUTOF10: 6
PAINLEVEL_OUTOF10: 8

## 2024-02-03 ASSESSMENT — PAIN DESCRIPTION - ORIENTATION
ORIENTATION: LEFT

## 2024-02-03 ASSESSMENT — PAIN - FUNCTIONAL ASSESSMENT: PAIN_FUNCTIONAL_ASSESSMENT: ACTIVITIES ARE NOT PREVENTED

## 2024-02-03 NOTE — PROGRESS NOTES
Physical Therapy  Facility/Department: [facility] ACUTE [facility]      NAME: Komal Crawfrod  : 1940 (83 y.o.)  MRN: [id number]  CODE STATUS: Full Code    Date of Service: 2/3/24      Past Medical History:   Diagnosis Date    Age-related cognitive decline     Ankle pain     Left ankle fracture in ortho boat.    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    Caffeine use     2 coffee/day, 1-2 tea per week    Cerebral artery occlusion with cerebral infarction (HCC)     Chronic back pain     Chronic ITP (idiopathic thrombocytopenia) (HCC)     CVA (cerebral infarction) ,     balance issues, short term memory loss    Diabetic gastroparesis (HCC)     Diverticular disease     GERD (gastroesophageal reflux disease)     Hiatal hernia     Hip fracture (HCC)     History of blood transfusion     History of decreased platelet count     Hyperlipemia     Hypertension     Internal hemorrhoid     Macular degeneration of left eye     S/P laser treatment    Nausea and vomiting     Neuropathy     Osteoarthritis     Ringing in ears     Self-catheterizes urinary bladder     6-7 times daily    TIA (transient ischemic attack) 2000    x1    Tubular adenoma     colon polyps    Type II or unspecified type diabetes mellitus without mention of complication, not stated as uncontrolled     Urinary incontinence     frequent UTI s/p infection, surgical dilatation lead to incontinence    Wears dentures     full on top, partial on bottom    Wears glasses      Past Surgical History:   Procedure Laterality Date    APPENDECTOMY      BLADDER SURGERY      bladder stimulator    BLADDER SUSPENSION      multiple    CARDIAC CATHETERIZATION      no stents    CARDIOVASCULAR STRESS TEST  2014    CATARACT REMOVAL WITH IMPLANT Left 2017    Tyree/Sulaiman    CATARACT REMOVAL WITH IMPLANT Right 2017    Tyree/Sulaiman

## 2024-02-03 NOTE — PROGRESS NOTES
Physical Medicine & Rehabilitation  Progress Note    2/3/2024 10:56 AM     CC: Ambulatory and ADL dysfunction due to left total hip arthroplasty left distal femur fracture with status post total knee arthroplasty revision    Subjective:   Pain controlled. Good spirits. Slept well.. Had a BM this AM      ROS:  Denies fevers, chills, sweats.  No chest pain, palpitations, lightheadedness.  Denies coughing, wheezing or shortness of breath.  Denies abdominal pain, nausea, diarrhea or constipation.  No new areas of joint pain.  Denies new areas of numbness or weakness.  Denies new anxiety or depression issues.  No new skin problems.    Rehabilitation:       PT:      Bed mobility  Bed Mobility Comments: Patient up in recliner prior to sessions and returned to recliner post.  Transfers  Sit to Stand: Maximum Assistance  Stand to Sit: Maximum Assistance  Bed to Chair: Dependent/Total (using Magda Stey.)  Balance  Sitting - Static: Fair  Sitting - Dynamic: Fair  Standing - Static: Poor (Requires Magda Stedy for static standing.)     Environmental Mobility  Ambulation  WB Status: (L) LE WBAT (Pt not able stand long enough to attempt gait.)  Ambulation  Comments: Not ready at this time.  Stairs/Curb  Stairs?: No  Wheelchair Activities  Wheelchair Parts Management: Yes  Left Brakes Level of Assistance: Stand by assistance  Right Brakes Level of Assistance: Stand by Assist  Propulsion: Yes  Propulsion 1  Propulsion: Manual  Level: Level Tile  Method: RUE;LUE  Level of Assistance: Contact guard assistance  Description/ Details: Patient has slow propulsion and required assist with turns.  Distance: 45 ft       OT      Activities of Daily Living  Upper Extremity Bathing  Assistance Level: Supervision  Skilled Clinical Factors: Pt completes while seated on rolling shower chair.     Lower Extremity Bathing  Assistance Level: Moderate assistance  Skilled Clinical Factors: A for washing bottom and B feet while seated on rolling shower    Output 700 ml   Net -700 ml       Glu last 24 hour  Recent Labs     02/02/24  1105 02/02/24  1624 02/02/24  2156 02/03/24  0547   POCGLU 103 199* 198* 125*       No results for input(s): \"CLARITYU\", \"COLORU\", \"PHUR\", \"SPECGRAV\", \"PROTEINU\", \"RBCUA\", \"BLOODU\", \"BACTERIA\", \"NITRU\", \"WBCUA\", \"LEUKOCYTESUR\", \"YEAST\", \"GLUCOSEU\", \"BILIRUBINUR\" in the last 72 hours.    XR ABDOMEN (KUB) (SINGLE AP VIEW)    Result Date: 1/29/2024  Prominent amount of stool throughout the abdomen and correlation with symptomatology for constipation is needed.       XR ANKLE LEFT (MIN 3 VIEWS)    Result Date: 2/1/2024  No acute abnormality of the ankle.       Vascular duplex lower extremity venous left [[id number]]    Resulted: 02/01/24 0243    Updated: 02/01/24 0245     Body Surface Area 1.74 m2   Narrative:     Technically limited study as stated however in the visualized fields the  left lower extremity is without evidence of deep or superficial venous  thrombosis.         Impression/Plan:   Impaired ADLs, gait, and mobility due to:     Left hip fracture:  S/p total hip arthroplasty on 1/20/24 (Dr. Bonner).  PT/OT for gait, mobility, strengthening, endurance, ADLs, and self care.  WBAT to the left lower limb, posterior hip precautions.,  Aquacel left hip and knee  Pain control with scheduled tylenol, as-needed tizanidine, as-needed norco.-Pt Minimize use of Norco monitor for Tylenol use if increased may need to change to oxycodone,  discussed switching Norco to oxycodone-patient currently not sure she wants to change - will check LFTs in a.m. and review with patient again  Left distal femur fracture:  S/p total knee arthroplasty revision on 1/23/24 (Dr. Keyes).  WBAT to the left lower limb.  Pain control as above.  Left ankle pain-x-ray as above no acute, suspect strain if continues consider  brace, symptoms better today, x-ray negative  Acute blood loss anemia:  Hemoglobin 6.8 in a.m., received transfusion improved to 7.4--Xarelto on

## 2024-02-03 NOTE — PROGRESS NOTES
[facility]   IN-PATIENT SERVICE   [facility]  Consult             Date:   2/3/2024  Patient name:  Komal Crawford  Date of admission:  1/26/2024  5:17 PM  MRN:   [id number]  Account:  [id number]  YOB: 1940  PCP:    Sabiha Bedolla MD  Room:   [address] [place] [address]  Code Status:    Full Code    Chief Complaint:     No chief complaint on file.      History Obtained From:     patient    History of Present Illness:     83-year-old female recently discharged from ARU, presenting with fall left hip pain chronic sciatica, x-ray shows no fracture severe osteoarthritis left hip  History of T2DM HTN CAD status post stent 2022, chronic thrombocytopenia chronic anemia, recurrent UTI neurogenic bladder straight cath at home  Orthopedics consulted due for left hip MRI and lumbar MRI done, impacted fracture left femoral neck, status post repair on January 20, 2024, distal comminuted fracture left femur, status post surgery on January 23, aspirin Plavix was on hold,  Aspirin Plavix and Xarelto was on hold due to hemoglobin drop below 7, given unit of blood, hemoglobin came back to 8.4,,  Rechecked this morning on January 26 is 8.4 as well,  Platelet count went up from 1 33-1 49 today,  So far looking very good,  Patient will be discharged to acute rehab, accepted,  Will restart Plavix and Xarelto if okay with orthopedic surgery at this time, will hold aspirin until patient is on Xarelto,  Can go back to aspirin and Plavix once the Xarelto is off as per orthopedic surgery,    Patient now admitted at [facility] acute rehab for further care        Past Medical History:     Past Medical History:   Diagnosis Date    Age-related cognitive decline     Ankle pain     Left ankle fracture in ortho boat.    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HCC) 1990    left with urinary incontinenc post surgical    Caffeine use     2 coffee/day, 1-2 tea per week    Cerebral  artery occlusion with cerebral infarction (HCC)     Chronic back pain     Chronic ITP (idiopathic thrombocytopenia) (HCC)     CVA (cerebral infarction) 2008, 2010    balance issues, short term memory loss    Diabetic gastroparesis (HCC)     Diverticular disease 1986    GERD (gastroesophageal reflux disease)     Hiatal hernia     Hip fracture (HCC)     History of blood transfusion     History of decreased platelet count     Hyperlipemia     Hypertension     Internal hemorrhoid     Macular degeneration of left eye 1980's    S/P laser treatment    Nausea and vomiting     Neuropathy     Osteoarthritis     Ringing in ears     Self-catheterizes urinary bladder     6-7 times daily    TIA (transient ischemic attack) 2000    x1    Tubular adenoma     colon polyps    Type II or unspecified type diabetes mellitus without mention of complication, not stated as uncontrolled     Urinary incontinence     frequent UTI s/p infection, surgical dilatation lead to incontinence    Wears dentures     full on top, partial on bottom    Wears glasses         Past Surgical History:     Past Surgical History:   Procedure Laterality Date    APPENDECTOMY  1962    BLADDER SURGERY      bladder stimulator    BLADDER SUSPENSION  2002    multiple    CARDIAC CATHETERIZATION  2008    no stents    CARDIOVASCULAR STRESS TEST  12/2014    CATARACT REMOVAL WITH IMPLANT Left 11/07/2017    Tyree/Sulaiman    CATARACT REMOVAL WITH IMPLANT Right 11/28/2017    Rafshanique/Sulaiman    COLON SURGERY      colostomy reversal    COLONOSCOPY  04/07/2016    tubular adenoma x3; internal hemorrhoids    COLONOSCOPY N/A 11/06/2019    COLONOSCOPY DIAGNOSTIC performed by Eli [facility]elli [facility] at [facility]    COLONOSCOPY  11/06/2019    COLOSTOMY  1986    bowel obstruction/ rupture from diverticular disease, reversal 3 mos later    CORONARY ANGIOPLASTY WITH STENT PLACEMENT  08/04/2022    CYSTOSCOPY  09/08/2017    W/ 200IU Botox     FRACTURE SURGERY Right 2011    hip

## 2024-02-03 NOTE — PLAN OF CARE
Problem: Discharge Planning  Goal: Discharge to home or other facility with appropriate resources  2/3/2024 0046 by Viviana Isaacs RN  Outcome: Progressing     Problem: Safety - Adult  Goal: Free from fall injury  2/3/2024 0046 by Viviana Isaacs RN  Outcome: Progressing     Problem: ABCDS Injury Assessment  Goal: Absence of physical injury  2/3/2024 0046 by Viviana Isaacs RN  Outcome: Progressing     Problem: Skin/Tissue Integrity  Goal: Absence of new skin breakdown  Description: 1.  Monitor for areas of redness and/or skin breakdown  2.  Assess vascular access sites hourly  3.  Every 4-6 hours minimum:  Change oxygen saturation probe site  4.  Every 4-6 hours:  If on nasal continuous positive airway pressure, respiratory therapy assess nares and determine need for appliance change or resting period.  2/3/2024 0046 by Viviana Isaacs RN  Outcome: Progressing

## 2024-02-04 LAB
GLUCOSE BLD-MCNC: 106 MG/DL (ref 65–105)
GLUCOSE BLD-MCNC: 133 MG/DL (ref 65–105)
GLUCOSE BLD-MCNC: 142 MG/DL (ref 65–105)
GLUCOSE BLD-MCNC: 181 MG/DL (ref 65–105)

## 2024-02-04 PROCEDURE — 97110 THERAPEUTIC EXERCISES: CPT

## 2024-02-04 PROCEDURE — 1180000000 HC REHAB R&B

## 2024-02-04 PROCEDURE — 6370000000 HC RX 637 (ALT 250 FOR IP): Performed by: INTERNAL MEDICINE

## 2024-02-04 PROCEDURE — 82947 ASSAY GLUCOSE BLOOD QUANT: CPT

## 2024-02-04 PROCEDURE — 6370000000 HC RX 637 (ALT 250 FOR IP): Performed by: PHYSICAL MEDICINE & REHABILITATION

## 2024-02-04 PROCEDURE — 97530 THERAPEUTIC ACTIVITIES: CPT

## 2024-02-04 PROCEDURE — 97535 SELF CARE MNGMENT TRAINING: CPT

## 2024-02-04 PROCEDURE — 99231 SBSQ HOSP IP/OBS SF/LOW 25: CPT | Performed by: INTERNAL MEDICINE

## 2024-02-04 RX ADMIN — ATORVASTATIN CALCIUM 10 MG: 10 TABLET, FILM COATED ORAL at 19:48

## 2024-02-04 RX ADMIN — PANTOPRAZOLE SODIUM 40 MG: 40 TABLET, DELAYED RELEASE ORAL at 05:30

## 2024-02-04 RX ADMIN — RANOLAZINE 500 MG: 500 TABLET, EXTENDED RELEASE ORAL at 08:14

## 2024-02-04 RX ADMIN — HYDROCODONE BITARTRATE AND ACETAMINOPHEN 1 TABLET: 5; 325 TABLET ORAL at 08:11

## 2024-02-04 RX ADMIN — ACETAMINOPHEN 650 MG: 325 TABLET, FILM COATED ORAL at 12:49

## 2024-02-04 RX ADMIN — ROPINIROLE HYDROCHLORIDE 4 MG: 1 TABLET, FILM COATED ORAL at 19:48

## 2024-02-04 RX ADMIN — PRIMIDONE 50 MG: 50 TABLET ORAL at 19:48

## 2024-02-04 RX ADMIN — RANOLAZINE 500 MG: 500 TABLET, EXTENDED RELEASE ORAL at 19:48

## 2024-02-04 RX ADMIN — RIVAROXABAN 10 MG: 10 TABLET, FILM COATED ORAL at 19:48

## 2024-02-04 RX ADMIN — CLOPIDOGREL BISULFATE 75 MG: 75 TABLET ORAL at 08:11

## 2024-02-04 RX ADMIN — POLYETHYLENE GLYCOL 3350 17 G: 17 POWDER, FOR SOLUTION ORAL at 08:12

## 2024-02-04 RX ADMIN — ACETAMINOPHEN 650 MG: 325 TABLET, FILM COATED ORAL at 18:35

## 2024-02-04 RX ADMIN — ACETAMINOPHEN 650 MG: 325 TABLET, FILM COATED ORAL at 06:02

## 2024-02-04 RX ADMIN — INSULIN GLARGINE 10 UNITS: 100 INJECTION, SOLUTION SUBCUTANEOUS at 19:48

## 2024-02-04 RX ADMIN — PRIMIDONE 50 MG: 50 TABLET ORAL at 08:14

## 2024-02-04 RX ADMIN — HYDROCODONE BITARTRATE AND ACETAMINOPHEN 2 TABLET: 5; 325 TABLET ORAL at 02:48

## 2024-02-04 RX ADMIN — GLIPIZIDE 5 MG: 10 TABLET ORAL at 05:30

## 2024-02-04 ASSESSMENT — PAIN SCALES - WONG BAKER
WONGBAKER_NUMERICALRESPONSE: HURTS WHOLE LOT

## 2024-02-04 ASSESSMENT — PAIN SCALES - GENERAL
PAINLEVEL_OUTOF10: 0
PAINLEVEL_OUTOF10: 0
PAINLEVEL_OUTOF10: 4
PAINLEVEL_OUTOF10: 1
PAINLEVEL_OUTOF10: 10

## 2024-02-04 ASSESSMENT — PAIN DESCRIPTION - ORIENTATION: ORIENTATION: LEFT

## 2024-02-04 ASSESSMENT — PAIN DESCRIPTION - DESCRIPTORS: DESCRIPTORS: ACHING

## 2024-02-04 ASSESSMENT — PAIN DESCRIPTION - LOCATION: LOCATION: OTHER (COMMENT)

## 2024-02-04 NOTE — PROGRESS NOTES
Physical Therapy  Facility/Department: [facility] ACUTE REHAB      NAME: Komal Crawford  : 1940 (83 y.o.)  MRN: [id number]  CODE STATUS: Full Code    Date of Service: 24      Past Medical History:   Diagnosis Date    Age-related cognitive decline     Ankle pain     Left ankle fracture in ortho boat.    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    Caffeine use     2 coffee/day, 1-2 tea per week    Cerebral artery occlusion with cerebral infarction (HCC)     Chronic back pain     Chronic ITP (idiopathic thrombocytopenia) (HCC)     CVA (cerebral infarction) ,     balance issues, short term memory loss    Diabetic gastroparesis (HCC)     Diverticular disease     GERD (gastroesophageal reflux disease)     Hiatal hernia     Hip fracture (HCC)     History of blood transfusion     History of decreased platelet count     Hyperlipemia     Hypertension     Internal hemorrhoid     Macular degeneration of left eye     S/P laser treatment    Nausea and vomiting     Neuropathy     Osteoarthritis     Ringing in ears     Self-catheterizes urinary bladder     6-7 times daily    TIA (transient ischemic attack) 2000    x1    Tubular adenoma     colon polyps    Type II or unspecified type diabetes mellitus without mention of complication, not stated as uncontrolled     Urinary incontinence     frequent UTI s/p infection, surgical dilatation lead to incontinence    Wears dentures     full on top, partial on bottom    Wears glasses      Past Surgical History:   Procedure Laterality Date    APPENDECTOMY      BLADDER SURGERY      bladder stimulator    BLADDER SUSPENSION      multiple    CARDIAC CATHETERIZATION      no stents    CARDIOVASCULAR STRESS TEST  2014    CATARACT REMOVAL WITH IMPLANT Left 2017    Tyree/Sulaiman    CATARACT REMOVAL WITH IMPLANT Right 2017    Tyree/Sulaiman     RW.  Long Term Goal 5: Patient will stand /ambulate up a ramp entrance with a RW independently  Additional Goals?: Yes  Long term goal 6: Patient will demonstrate will full PROM within all involved planes to assist with her desired ADLs without limiitation.  Long term goal 7: Patient will demonstrate will improved strength within all involved planes to 4/5 t0 5/5 MMT to assist with her desired ADLs without limitation.    PLAN OF CARE  Physical Therapy Plan  General Plan:  minutes of therapy at least 5 out of 7 days a week  Current Treatment Recommendations: Strengthening;ROM;Balance training;Functional mobility training;Transfer training;Gait training;Endurance training;Safety education & training;Patient/Caregiver education & training;Equipment evaluation, education, & procurement;Therapeutic activities;Co-Treatment;Positioning  Safety Devices  Type of Devices: All fall risk precautions in place;Call light within reach;Gait belt;Left in chair    EDUCATION  Education  Education Given To: Patient  Education Provided: Transfer Training  Education Method: Verbal;Demonstration  Barriers to Learning: None  Education Outcome: Verbalized understanding;Demonstrated understanding;Continued education needed       Therapy Time     02/04/24 1015 02/04/24 1340   PT Individual Minutes   Time In 1015 1340   Time Out 1115 1440   Minutes 60 60            Racheal Maurer, PTA, 02/04/24 at 4:35 PM

## 2024-02-04 NOTE — PLAN OF CARE
Problem: Discharge Planning  Goal: Discharge to home or other facility with appropriate resources  Outcome: Progressing  Flowsheets (Taken 2/4/2024 0107)  Discharge to home or other facility with appropriate resources: Identify barriers to discharge with patient and caregiver     Problem: Safety - Adult  Goal: Free from fall injury  Outcome: Progressing     Problem: ABCDS Injury Assessment  Goal: Absence of physical injury  Outcome: Progressing  Flowsheets (Taken 2/4/2024 0107)  Absence of Physical Injury: Implement safety measures based on patient assessment     Problem: Skin/Tissue Integrity  Goal: Absence of new skin breakdown  Description: 1.  Monitor for areas of redness and/or skin breakdown  2.  Assess vascular access sites hourly  3.  Every 4-6 hours minimum:  Change oxygen saturation probe site  4.  Every 4-6 hours:  If on nasal continuous positive airway pressure, respiratory therapy assess nares and determine need for appliance change or resting period.  Outcome: Progressing     Problem: Chronic Conditions and Co-morbidities  Goal: Patient's chronic conditions and co-morbidity symptoms are monitored and maintained or improved  Outcome: Progressing  Flowsheets (Taken 2/4/2024 0107)  Care Plan - Patient's Chronic Conditions and Co-Morbidity Symptoms are Monitored and Maintained or Improved:   Monitor and assess patient's chronic conditions and comorbid symptoms for stability, deterioration, or improvement   Collaborate with multidisciplinary team to address chronic and comorbid conditions and prevent exacerbation or deterioration   Update acute care plan with appropriate goals if chronic or comorbid symptoms are exacerbated and prevent overall improvement and discharge     Problem: Pain  Goal: Verbalizes/displays adequate comfort level or baseline comfort level  Outcome: Progressing  Flowsheets (Taken 2/4/2024 0107)  Verbalizes/displays adequate comfort level or baseline comfort level:   Encourage patient  to monitor pain and request assistance   Assess pain using appropriate pain scale   Administer analgesics based on type and severity of pain and evaluate response   Implement non-pharmacological measures as appropriate and evaluate response   Consider cultural and social influences on pain and pain management   Notify Licensed Independent Practitioner if interventions unsuccessful or patient reports new pain     Problem: Nutrition Deficit:  Goal: Optimize nutritional status  Outcome: Progressing  Flowsheets (Taken 2/4/2024 0106)  Nutrient intake appropriate for improving, restoring, or maintaining nutritional needs:   Assess nutritional status and recommend course of action   Monitor oral intake, labs, and treatment plans   Provide specific nutrition education to patient or family as appropriate   Order, calculate, and assess calorie counts as needed   Recommend appropriate diets, oral nutritional supplements, and vitamin/mineral supplements

## 2024-02-04 NOTE — PLAN OF CARE
Problem: Discharge Planning  Goal: Discharge to home or other facility with appropriate resources  2/4/2024 1308 by Merle Hernández LPN  Outcome: Progressing     Problem: Safety - Adult  Goal: Free from fall injury  2/4/2024 1308 by Merle Hernández LPN  Outcome: Progressing  Flowsheets (Taken 2/4/2024 0804)  Free From Fall Injury: Instruct family/caregiver on patient safety     Problem: ABCDS Injury Assessment  Goal: Absence of physical injury  2/4/2024 1308 by Merle Hernández LPN  Outcome: Progressing  Flowsheets (Taken 2/4/2024 0804)  Absence of Physical Injury: Implement safety measures based on patient assessment     Problem: Skin/Tissue Integrity  Goal: Absence of new skin breakdown  Description: 1.  Monitor for areas of redness and/or skin breakdown  2.  Assess vascular access sites hourly  3.  Every 4-6 hours minimum:  Change oxygen saturation probe site  4.  Every 4-6 hours:  If on nasal continuous positive airway pressure, respiratory therapy assess nares and determine need for appliance change or resting period.  2/4/2024 1308 by Merle Hernández LPN  Outcome: Progressing     Problem: Chronic Conditions and Co-morbidities  Goal: Patient's chronic conditions and co-morbidity symptoms are monitored and maintained or improved  2/4/2024 1308 by Merle Hernández LPN  Outcome: Progressing     Problem: Pain  Goal: Verbalizes/displays adequate comfort level or baseline comfort level  2/4/2024 1308 by Merle Hernández LPN  Outcome: Progressing     Problem: Nutrition Deficit:  Goal: Optimize nutritional status  2/4/2024 1308 by Merle Hernández LPN  Outcome: Progressing

## 2024-02-04 NOTE — PROGRESS NOTES
Component Value Date    CHOL 182 04/26/2023    HDL 48 04/26/2023    TRIG 130 04/26/2023     LIVER PROFILE:   Recent Labs     02/03/24  0647   AST 13   ALT 10   BILIDIR 0.3*   BILITOT 0.8   ALKPHOS 106*          I/O (24Hr):  No intake or output data in the 24 hours ending 02/04/24 0859      Glu last 24 hour  Recent Labs     02/03/24  1157 02/03/24  1615 02/03/24 2004 02/04/24  0534   POCGLU 123* 269* 231* 133*       No results for input(s): \"CLARITYU\", \"COLORU\", \"PHUR\", \"SPECGRAV\", \"PROTEINU\", \"RBCUA\", \"BLOODU\", \"BACTERIA\", \"NITRU\", \"WBCUA\", \"LEUKOCYTESUR\", \"YEAST\", \"GLUCOSEU\", \"BILIRUBINUR\" in the last 72 hours.    XR ABDOMEN (KUB) (SINGLE AP VIEW)    Result Date: 1/29/2024  Prominent amount of stool throughout the abdomen and correlation with symptomatology for constipation is needed.       XR ANKLE LEFT (MIN 3 VIEWS)    Result Date: 2/1/2024  No acute abnormality of the ankle.       Vascular duplex lower extremity venous left [[id number]]    Resulted: 02/01/24 0243    Updated: 02/01/24 0245     Body Surface Area 1.74 m2   Narrative:     Technically limited study as stated however in the visualized fields the  left lower extremity is without evidence of deep or superficial venous  thrombosis.         Impression/Plan:   Impaired ADLs, gait, and mobility due to:     Left hip fracture:  S/p total hip arthroplasty on 1/20/24 (Dr. Bonner).  PT/OT for gait, mobility, strengthening, endurance, ADLs, and self care.  WBAT to the left lower limb, posterior hip precautions.,  Aquacel left hip and knee  Pain control with scheduled tylenol, as-needed tizanidine, as-needed norco.-Pt Minimize use of Norco monitor for Tylenol use if increased may need to change to oxycodone,  discussed switching Norco to oxycodone-patient currently not sure she wants to change - will check LFTs in a.m. and review with patient again  Left distal femur fracture:  S/p total knee arthroplasty revision on 1/23/24 (Dr. Keyes).  WBAT to the left lower  negative left lower extremity  Internal Medicine for medical management  Follow up PCP 1-2 weeks, Orthopedic Surgery, patient resume aspirin when completed Xarelto        ACACIA PALUMBO MD

## 2024-02-04 NOTE — PROGRESS NOTES
[facility]   IN-PATIENT SERVICE   [facility]  Consult             Date:   2/4/2024  Patient name:  Komla Crawford  Date of admission:  1/26/2024  5:17 PM  MRN:   [id number]  Account:  [id number]  YOB: 1940  PCP:    Sabiha Bedolla MD  Room:   [address] [place] [address]  Code Status:    Full Code    Chief Complaint:     No chief complaint on file.      History Obtained From:     patient    History of Present Illness:     83-year-old female recently discharged from ARU, presenting with fall left hip pain chronic sciatica, x-ray shows no fracture severe osteoarthritis left hip  History of T2DM HTN CAD status post stent 2022, chronic thrombocytopenia chronic anemia, recurrent UTI neurogenic bladder straight cath at home  Orthopedics consulted due for left hip MRI and lumbar MRI done, impacted fracture left femoral neck, status post repair on January 20, 2024, distal comminuted fracture left femur, status post surgery on January 23, aspirin Plavix was on hold,  Aspirin Plavix and Xarelto was on hold due to hemoglobin drop below 7, given unit of blood, hemoglobin came back to 8.4,,  Rechecked this morning on January 26 is 8.4 as well,  Platelet count went up from 1 33-1 49 today,  So far looking very good,  Patient will be discharged to acute rehab, accepted,  Will restart Plavix and Xarelto if okay with orthopedic surgery at this time, will hold aspirin until patient is on Xarelto,  Can go back to aspirin and Plavix once the Xarelto is off as per orthopedic surgery,    Patient now admitted at [facility] acute rehab for further care        Past Medical History:     Past Medical History:   Diagnosis Date    Age-related cognitive decline     Ankle pain     Left ankle fracture in ortho boat.    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  2/1    Patient seen and examined  Her vitals have been stable  Doppler lower extremity limited study but negative for DVT  Her hemoglobin has been stable  Will start p.o. iron  Resume Xarelto  Stool occult is negative  Blood sugars to r has been stable  Patient allergic to p.o. iron  Continue to monitor CBC  Will discontinue every 8 hourly checks    2/2  Blood pressure controlled, Xarelto was resumed yesterday check CBC to  Blood pressure in good range, no further hypoglycemic episode    2/3  Patient reports standing the wrong way earlier today resulting in increased pain to left knee and left ankle.  Reports ankle swelling . no fracture on left ankle x-ray done earlier today  Hemoglobin stable around 9  Blood pressure and blood sugar logs reviewed and in good range holding metoprolol  Patient participating in therapy  Blood pressure is low, patient has Winters, with dark urine-encouraged oral hydration       2/4  Blood pressure better today  Blood sugars controlled  Patient reports no new complaints. Engaging with physical therapy. Labs and vitals reviewed. No new issues. Continue with current care.           continue consultations:   IP CONSULT TO DIETITIAN  IP CONSULT TO SOCIAL WORK  IP CONSULT TO INTERNAL MEDICINE  IP CONSULT TO GI  IP CONSULT TO ORTHOPEDIC SURGERY     Patient is admitted as inpatient status because of co-morbidities listed above, severity of signs and symptoms as outlined, requirement for current medical therapies and most importantly because of direct risk to patient if care not provided in a hospital setting.    Heydi Maya MD  2/4/2024  6:05 PM    Copy sent to Sabiha Christina MD    Please note that this chart was generated using voice recognition Dragon dictation software.  Although every effort was made to ensure the accuracy of this automated transcription, some errors in transcription may have occurred.

## 2024-02-04 NOTE — PROGRESS NOTES
[facility] Occupational Therapy Daily Treatment Note    Date: 24  Patient Name: Komal Crawford       Room: 2608/2608-01  MRN: [id number]  Account: [id number]   : 1940  (83 y.o.) Gender: female        Diagnosis: Admitted with left femoral neck and shane-prosthetic femur fracture post fall,  L THR 24.   left hip fracture s/p total hip arthroplasty 24. , left distal femur fracture s/p total knee arthroplasty revision 24.  Additional Pertinent Hx: Expand All Collapse All    Physical Medicine & Rehabilitation History and Physical  [facility]      Primary care provider:  Sabiha Bedolla MD      Chief Complaint and Reason for Rehabilitation Admission:   ADL and mobility deficits secondary to left hip fracture s/p total hip arthroplasty, left distal femur fracture s/p total knee arthroplasty revision     History of Present Illness:  Komal Crawford is a 83 y.o. 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  She was originally admitted to [facility] on 24.     She initially presented after a mechanical fall when attempting to  her dog and her left leg gave out.  Denied hitting her head.  Initial x-rays did not show any pelvic or hip fractures but did show moderate to severe osteoarthritis of the left hip.  MRI left hip showed suspected posttraumatic marrow edema and probably impaction fracture at the superolateral left femoral head.  She was also found to have comminuted impacted fracture of the left distal femur.  She underwent left total hip arthroplasty on 24 (Dr. Bonner) and left total knee arthroplasty revision on 24 (Dr. Keyes).  She is WBAT to the left lower limb with  Training;Transfer Training;Equipment;DME/Home Modifications;Fall Prevention Strategies  Education Provided Comments: DC planning, progressing with self care tasks  Education Method: Demonstration;Verbal  Barriers to Learning: None  Education Outcome: Verbalized understanding;Demonstrated understanding    OT Equipment Recommendations  Equipment Needed: Yes  Other: TBD    Safety Devices  Safety Devices in place: Yes  Type of devices: Nurse notified;Left in chair;Call light within reach;Chair alarm in place       Goals  Patient Goals   Patient goals : needs to be able to stand to self cath for toileting  Short Term Goals  Time Frame for Short Term Goals: 1 week  Short Term Goal 1: set up UE dressing.  Short Term Goal 2: pt to clay 15 min seated EOB during adls, ex with SBA for static balance  Short Term Goal 3: Pt will tolerate standing 3+ minutes during functional activity with Max A  of 1 using marisol stedy.  Short Term Goal 4: min A with don pullups, pants over feet using equipment, mod x 2 to stand and don pants over hips using marisol stedy.  Short Term Goal 5: min A with don slipper socks with equipment.  Short Term Goal 6: Pt will participate in BUE shld, elbow AROM with min resistance 10 reps x 3 ex to increase work tolerance and independence with self care and mobility    Long Term Goals  Time Frame for Long Term Goals : By discharge  Long Term Goal 1: set up with don pullups, pants over feet using equipment, max x 1 to stand and don pants over hips with UE support.  Long Term Goal 2: max x 1 SPT to toilet.  Long Term Goal 3: set up don slipper socks with equipment.  Long Term Goal 4: clay 4-5 min stand with BUE support and max x 1 during adls, ex.    Plan  Occupational Therapy Plan  Times Per Week: 5-7  Times Per Day: Twice a day  Current Treatment Recommendations: Self-Care / ADL, Balance training, Functional mobility training, Endurance training, Pain management, Safety education & training, Patient/Caregiver

## 2024-02-05 LAB
GLUCOSE BLD-MCNC: 102 MG/DL (ref 65–105)
GLUCOSE BLD-MCNC: 125 MG/DL (ref 65–105)
GLUCOSE BLD-MCNC: 213 MG/DL (ref 65–105)

## 2024-02-05 PROCEDURE — 6370000000 HC RX 637 (ALT 250 FOR IP): Performed by: INTERNAL MEDICINE

## 2024-02-05 PROCEDURE — 97116 GAIT TRAINING THERAPY: CPT

## 2024-02-05 PROCEDURE — 97535 SELF CARE MNGMENT TRAINING: CPT

## 2024-02-05 PROCEDURE — 1180000000 HC REHAB R&B

## 2024-02-05 PROCEDURE — 82947 ASSAY GLUCOSE BLOOD QUANT: CPT

## 2024-02-05 PROCEDURE — 97110 THERAPEUTIC EXERCISES: CPT

## 2024-02-05 PROCEDURE — 99232 SBSQ HOSP IP/OBS MODERATE 35: CPT | Performed by: PHYSICAL MEDICINE & REHABILITATION

## 2024-02-05 PROCEDURE — 99231 SBSQ HOSP IP/OBS SF/LOW 25: CPT | Performed by: INTERNAL MEDICINE

## 2024-02-05 PROCEDURE — 97530 THERAPEUTIC ACTIVITIES: CPT

## 2024-02-05 PROCEDURE — 6370000000 HC RX 637 (ALT 250 FOR IP): Performed by: PHYSICAL MEDICINE & REHABILITATION

## 2024-02-05 RX ORDER — NYSTATIN 100000 [USP'U]/G
POWDER TOPICAL
Qty: 45 EACH | Refills: 3 | OUTPATIENT
Start: 2024-02-05

## 2024-02-05 RX ADMIN — PRIMIDONE 50 MG: 50 TABLET ORAL at 20:15

## 2024-02-05 RX ADMIN — POLYETHYLENE GLYCOL 3350 17 G: 17 POWDER, FOR SOLUTION ORAL at 08:27

## 2024-02-05 RX ADMIN — ATORVASTATIN CALCIUM 10 MG: 10 TABLET, FILM COATED ORAL at 20:09

## 2024-02-05 RX ADMIN — RANOLAZINE 500 MG: 500 TABLET, EXTENDED RELEASE ORAL at 20:15

## 2024-02-05 RX ADMIN — ACETAMINOPHEN 650 MG: 325 TABLET, FILM COATED ORAL at 12:48

## 2024-02-05 RX ADMIN — ACETAMINOPHEN 650 MG: 325 TABLET, FILM COATED ORAL at 18:37

## 2024-02-05 RX ADMIN — PANTOPRAZOLE SODIUM 40 MG: 40 TABLET, DELAYED RELEASE ORAL at 05:26

## 2024-02-05 RX ADMIN — HYDROCODONE BITARTRATE AND ACETAMINOPHEN 1 TABLET: 5; 325 TABLET ORAL at 09:15

## 2024-02-05 RX ADMIN — ROPINIROLE HYDROCHLORIDE 4 MG: 1 TABLET, FILM COATED ORAL at 20:09

## 2024-02-05 RX ADMIN — RIVAROXABAN 10 MG: 10 TABLET, FILM COATED ORAL at 20:10

## 2024-02-05 RX ADMIN — ACETAMINOPHEN 650 MG: 325 TABLET, FILM COATED ORAL at 05:26

## 2024-02-05 RX ADMIN — GLIPIZIDE 5 MG: 10 TABLET ORAL at 05:26

## 2024-02-05 RX ADMIN — HYDROCODONE BITARTRATE AND ACETAMINOPHEN 1 TABLET: 5; 325 TABLET ORAL at 20:09

## 2024-02-05 RX ADMIN — CLOPIDOGREL BISULFATE 75 MG: 75 TABLET ORAL at 08:27

## 2024-02-05 RX ADMIN — RANOLAZINE 500 MG: 500 TABLET, EXTENDED RELEASE ORAL at 08:27

## 2024-02-05 RX ADMIN — INSULIN GLARGINE 10 UNITS: 100 INJECTION, SOLUTION SUBCUTANEOUS at 20:09

## 2024-02-05 RX ADMIN — PRIMIDONE 50 MG: 50 TABLET ORAL at 08:27

## 2024-02-05 ASSESSMENT — PAIN SCALES - WONG BAKER: WONGBAKER_NUMERICALRESPONSE: HURTS WHOLE LOT

## 2024-02-05 ASSESSMENT — PAIN SCALES - GENERAL
PAINLEVEL_OUTOF10: 2
PAINLEVEL_OUTOF10: 8
PAINLEVEL_OUTOF10: 6
PAINLEVEL_OUTOF10: 6
PAINLEVEL_OUTOF10: 2
PAINLEVEL_OUTOF10: 0

## 2024-02-05 ASSESSMENT — PAIN - FUNCTIONAL ASSESSMENT: PAIN_FUNCTIONAL_ASSESSMENT: ACTIVITIES ARE NOT PREVENTED

## 2024-02-05 ASSESSMENT — PAIN DESCRIPTION - LOCATION
LOCATION: KNEE

## 2024-02-05 ASSESSMENT — PAIN DESCRIPTION - DESCRIPTORS
DESCRIPTORS: ACHING;BURNING
DESCRIPTORS: ACHING;BURNING
DESCRIPTORS: BURNING;THROBBING
DESCRIPTORS: BURNING;THROBBING

## 2024-02-05 ASSESSMENT — PAIN DESCRIPTION - ORIENTATION
ORIENTATION: LEFT

## 2024-02-05 NOTE — PROGRESS NOTES
Xarelto Lipitor, currently denies any chest pain shortness of breath on    Hypertension blood pressures currently controlled, PERRL, conjunctiva normal Imdur Lopressor    Neurogenic bladder, patient self caths at home, has history of benign spinal cord tumor s/p resection, patient on chronic nitrofurantoin therapy for recurrent UTIs, will resume    Hyperlipidemia on Lipitor    Type 2 diabetes mellitus, blood sugars has been stable on Lantus and sliding scale    Chronic thrombocytopenia secondary to ITP follows up with hematology oncology as outpatient    Anemia, likely acute anemia of blood loss secondary to postop  Hemoglobin was 8.7, no active bleeding present, continue to monitor    DVT prophylaxis Xarelto    1/289Patient seen and examined, complaining of constipation  On stool softeners  Vitals have been stable  Resumed on nitrofurantoin patient takes nitrofurantoin as suppressive therapy for recurrent UTIs next  Anemia of blood loss, continue to monitor no active bleeding present  On Xarelto and Plavix,   As per Ortho recommendations,  Resume aspirin when off Xarelto.    1/29  Patient seen and examined, continues to complain of pain at the surgical site  Blood sugars been stable  Blood pressure has been borderline lower side    Will DC amlodipine  Drop in hemoglobin hemoglobin of 7.7 likely postop    Needs CBC tomorrow  Get iron studies  On Plavix and Xarelto    1/30  Patient seen and examined  Patient was orthostatic this morning, hemoglobin dropped to 7.1, is been slowly dropping, likely postop hold on Xarelto, check stool occult  Blood sugar was in 70s this morning decrease Lantus to 10 units, very poor p.o. intake encouraged patient to increase p.o. intake  Continue to monitor H&H  Iron studies reviewed, transfuse if drops below 7.  Patient recently had stent placement in September, was on aspirin and Plavix, aspirin was DC'd since patient was started on Xarelto, continue Plavix for now  Hold onto Imdur,  put ProAmatine as needed.  2/1    Patient seen and examined  Her vitals have been stable  Doppler lower extremity limited study but negative for DVT  Her hemoglobin has been stable  Will start p.o. iron  Resume Xarelto  Stool occult is negative  Blood sugars to r has been stable  Patient allergic to p.o. iron  Continue to monitor CBC  Will discontinue every 8 hourly checks    2/2  Blood pressure controlled, Xarelto was resumed yesterday check CBC to  Blood pressure in good range, no further hypoglycemic episode    2/3  Patient reports standing the wrong way earlier today resulting in increased pain to left knee and left ankle.  Reports ankle swelling . no fracture on left ankle x-ray done earlier today  Hemoglobin stable around 9  Blood pressure and blood sugar logs reviewed and in good range holding metoprolol  Patient participating in therapy  Blood pressure is low, patient has Winters, with dark urine-encouraged oral hydration       2/4  Blood pressure better today  Blood sugars controlled  Patient reports no new complaints. Engaging with physical therapy. Labs and vitals reviewed. No new issues. Continue with current care.     2/5  Blood pressure and blood sugar log reviewed and controlled  Patient reports no new complaints. Engaging with physical therapy. Labs and vitals reviewed. No new issues. Continue with current care.           continue consultations:   IP CONSULT TO DIETITIAN  IP CONSULT TO SOCIAL WORK  IP CONSULT TO INTERNAL MEDICINE  IP CONSULT TO GI  IP CONSULT TO ORTHOPEDIC SURGERY     Patient is admitted as inpatient status because of co-morbidities listed above, severity of signs and symptoms as outlined, requirement for current medical therapies and most importantly because of direct risk to patient if care not provided in a hospital setting.    Heydi Maya MD  2/5/2024  2:30 PM    Copy sent to Sabiha Christina MD    Please note that this chart was generated using voice recognition Dragon

## 2024-02-05 NOTE — PROGRESS NOTES
assistance  Skilled Clinical Factors: Max A x2 with SS    Functional Mobility  Device: Wheelchair  Activity: To/From bathroom  Assistance Level: Dependent  Skilled Clinical Factors: SS    OT Exercises  Exercise Treatment: PM: Pt instructed in BUE exercises for facilitation of increased strength to support safety with ADLs and functional transfers. Pt completes with 2# free weights in all available planes, X15 reps each with RB's as needed. Next, Pt utilizes red resisitve thera-flex, up/down and twisting X 15 reps each. Next, pt instruction to complete w/c push ups x 5 reps x 2 to promote max independence when completing functional transfers. Assist in holding LLE in position. Pt became emotional and concerned that she is not performing as she should. Writer provided therapeutic use of self to ensure that Pt is following POC and provided edu on progess being made within the POC.    Assessment  Assessment  Activity Tolerance: Patient limited by fatigue;Patient limited by pain;Patient limited by endurance (improvement noted this date)  Discharge Recommendations: Patient would benefit from continued therapy after discharge    Patient Education  Education  Education Given To: Patient  Education Provided: Plan of Care;Precautions;Safety;ADL Function;Mobility Training;Transfer Training;Equipment;DME/Home Modifications;Fall Prevention Strategies  Education Method: Demonstration;Verbal  Barriers to Learning: None  Education Outcome: Verbalized understanding;Demonstrated understanding    OT Equipment Recommendations  Other: TBD    Safety Devices  Safety Devices in place: Yes  Type of devices: Nurse notified;Left in chair (Left in w/c at therapy gym for PT session with family and PTA)       Goals  Patient Goals   Patient goals : needs to be able to stand to self cath for toileting  Short Term Goals  Time Frame for Short Term Goals: 1 week  Short Term Goal 1: set up UE dressing.  Short Term Goal 2: pt to clay 15 min seated EOB  during adls, ex with SBA for static balance  Short Term Goal 3: Pt will tolerate standing 3+ minutes during functional activity with Max A  of 1 using marisol stedy.  Short Term Goal 4: min A with don pullups, pants over feet using equipment, mod x 2 to stand and don pants over hips using marisol stedy.  Short Term Goal 5: min A with don slipper socks with equipment.  Short Term Goal 6: Pt will participate in BUE shld, elbow AROM with min resistance 10 reps x 3 ex to increase work tolerance and independence with self care and mobility    Long Term Goals  Time Frame for Long Term Goals : By discharge  Long Term Goal 1: set up with don pullups, pants over feet using equipment, max x 1 to stand and don pants over hips with UE support.  Long Term Goal 2: max x 1 SPT to toilet.  Long Term Goal 3: set up don slipper socks with equipment.  Long Term Goal 4: clay 4-5 min stand with BUE support and max x 1 during adls, ex.    Plan  Occupational Therapy Plan  Times Per Week: 5-7  Times Per Day: Twice a day  Current Treatment Recommendations: Self-Care / ADL, Balance training, Functional mobility training, Endurance training, Pain management, Safety education & training, Patient/Caregiver education & training, Equipment evaluation, education, & procurement       02/05/24 0907 02/05/24 0915   OT Individual Minutes   Time In 0907 1302   Time Out 1011 1330   Minutes 64 28         Electronically signed by BRIANNA Azul on 2/5/24 at 3:59 PM EST

## 2024-02-05 NOTE — PATIENT CARE CONFERENCE
[facility]  TEAM CONFERENCE NOTE  Date: 24  Patient Name: Komal Crawford       Room: 2608/2608-01  MRN: [id number]       : 1940  (83 y.o.)     Gender: female     Referring Practitioner: Michael Harrell MD/Angela Sutton MD     PRINCIPAL DIAGNOSIS: Closed fracture of left hip with routine healing    NURSING    Bladder Continence  Not Applicable - Device in Use (Indwelling Catheter, Condom Catheter or Ostomy)  Bowel Continence Always Continent    Date of Last BM: 2024    Bladder/Bowel Program Interventions: Both Bowel & Bladder Program In Place     Wounds/Incisions/Ulcers: Incision healing well    Pain Control: Patient's pain currently controlled and pain regimen effective as ordered    Pain Medication Regimen Usage Pattern: MAR reviewed and pain medications are being used at the following frequency (Specify Medication, # Doses Administered on average per day, identified patterns of use - for example: time of day, prior to activity/therapy)  Norco 5-325, 1-2 tablets PRN q 4 hours, (last taken 2024 @[facility])  Zanaflex 2 mg nightly PRN  (last taken 2/3/2024  8am)    Fall Risk:  Falling star program initiated    Medication Education Program: Patient able to manage medications and being educated by nursing    Discharge Preparation Patient/Responsible Party Education In Progress:   Wound Care    Nursing specific communication for TEAM: No additional information identified requiring communication at this time    PHYSICAL THERAPY  Bed mobility  Supine to Sit: Moderate assistance  Sit to Supine: Moderate assistance  Scooting: Minimal assistance  Bed Mobility Comments: Patient up in recliner prior to  AM session and returned to recliner post.  PM she returned to bed post session.    Transfers:  Sit to Stand: Maximum Assistance (on Magda Chand for in // bars.)  Stand to Sit: Maximum Assistance  Bed to Chair: Dependent/Total (using Magda Stey.  Requires 2 person assist for stand

## 2024-02-05 NOTE — PROGRESS NOTES
Physical Medicine & Rehabilitation  Progress Note      Subjective:      83 year-old female with L hip fracture treated by ISHAN and L distal femur fracture treated by TKA revision. Patient is well, but has had some minor complaints of pain in the left hip and knee, requiring pain medications. She does note some improvement in her mobility today in therapy. No new issues with sleep, appetite, bowel. She has indwelling Winters.     ROS:  Denies fevers, chills, sweats.  No chest pain, palpitations, lightheadedness.  Denies coughing, wheezing or shortness of breath.  Denies abdominal pain, nausea, diarrhea or constipation.  No new areas of joint pain.  Denies new areas of numbness or weakness.  Denies new anxiety or depression issues.  No new skin problems.    Rehabilitation:       PT:    Bed mobility  Bridging:  (Pt did not want to try mobility in p.m. d/t increased pain L LE)  Rolling to Left: Maximum assistance  Rolling to Right: Maximum assistance  Supine to Sit: Moderate assistance  Sit to Supine: Moderate assistance  Scooting: Minimal assistance  Bed Mobility Comments: Patient up in recliner prior to  AM session and returned to recliner post.  PM she returned to bed post session.         Transfers  Sit to Stand: Maximum Assistance (on Magda Stedy for in // bars.)  Stand to Sit: Maximum Assistance  Bed to Chair: Dependent/Total (using Magda Stey.)  Stand Pivot Transfers: Dependent/Total (Magda Stedy)  Squat Pivot Transfers: Dependent/Total (Patient fearful and required second person.  Max x 2 with verbal and tactile cues.)  Comment: N/A -attempted sit to stand transfers x 2/unable even with the bed height increased.         Ambulation  WB Status: (L) LE WBAT (Pt not able stand long enough to attempt gait.)  Ambulation  Device:  (Pt not able to ambulate, Magda Stedy used for pregait activity.)  Comments: Not ready at this time.       OT:  Grooming/Oral Hygiene  Assistance Level: Modified independent  Skilled Clinical  Units, SubCUTAneous, Nightly  atorvastatin (LIPITOR) tablet 10 mg, 10 mg, Oral, Nightly  pantoprazole (PROTONIX) tablet 40 mg, 40 mg, Oral, QAM AC  [Held by provider] isosorbide mononitrate (IMDUR) extended release tablet 30 mg, 30 mg, Oral, QPM  [Held by provider] metoprolol succinate (TOPROL XL) extended release tablet 12.5 mg, 12.5 mg, Oral, QPM  primidone (MYSOLINE) tablet 50 mg, 50 mg, Oral, BID  ranolazine (RANEXA) extended release tablet 500 mg, 500 mg, Oral, BID  rOPINIRole (REQUIP) tablet 4 mg, 4 mg, Oral, Nightly  tiZANidine (ZANAFLEX) tablet 2 mg, 2 mg, Oral, Nightly PRN  glipiZIDE (GLUCOTROL) tablet 5 mg, 5 mg, Oral, QAM AC  clopidogrel (PLAVIX) tablet 75 mg, 75 mg, Oral, Daily  HYDROcodone-acetaminophen (NORCO) 5-325 MG per tablet 1 tablet, 1 tablet, Oral, Q4H PRN **OR** HYDROcodone-acetaminophen (NORCO) 5-325 MG per tablet 2 tablet, 2 tablet, Oral, Q4H PRN  acetaminophen (TYLENOL) tablet 650 mg, 650 mg, Oral, Q6H  polyethylene glycol (GLYCOLAX) packet 17 g, 17 g, Oral, Daily  rivaroxaban (XARELTO) tablet 10 mg, 10 mg, Oral, Daily  bisacodyl (DULCOLAX) suppository 10 mg, 10 mg, Rectal, Daily PRN  Facility-Administered Medications Ordered in Other Encounters: 0.9 % sodium chloride infusion 250 mL, 250 mL, IntraVENous, Once      Impression/Plan:   Impaired ADLs, gait, and mobility due to:      L hip fracture: s/p ISHAN 1/20/24 by Dr. Bonner. WBAT LLE with posterior hip precautions PT/OT  for gait, mobility, strengthening, endurance, ADLs, and self care. Has prn Tylenol, Tizanidine, Norco, Tylenol for pain.   L distal femur fracture: s/p L TKA revision by Dr. Keyes 1/23/24. WBAT LLE.   Acute blood loss anemia: Hb improved and stable.   Neurogenic bladder/urine retention: history benign spinal cord tumor. Performs self cath at home. Has Winters currently due to painful catheterizations. On chronic Macrobid for prophylaxis.   Hx CVA: on Plavix, atorvastatin. ASA on hold while on Xarelto  CAD: s/p stent.

## 2024-02-05 NOTE — INTERDISCIPLINARY ROUNDS
[facility]  INTERDISCIPLINARY MEETING  Date: 24  Patient Name: Komal Crawford       Room: 2608/2608-01  MRN: [id number]       : 1940  (83 y.o.)     Gender: female     Patient's care team, including nursing, speech language pathologist, occupational therapist, and/or physical therapist, met to discuss patient's care needs. Any patient concerns, change in medical status, and current transfer/mobility status were identified at this time.     Any Additional Pertinent Information:   Not Applicable    Participating Team Members:  Nurse: Cha Mas RN  Occupational Therapist: Maty Driver OT   Physical Therapist: Zoya Ricks PT  Speech Therapist:  N/A

## 2024-02-05 NOTE — CONSENT
Informed Consent for Blood Component Transfusion Note    I have discussed with the patient the rationale for blood component transfusion; its benefits in treating or preventing fatigue, organ damage, or death; and its risk which includes mild transfusion reactions, rare risk of blood borne infection, or more serious but rare reactions. I have discussed the alternatives to transfusion, including the risk and consequences of not receiving transfusion. The patient had an opportunity to ask questions and had agreed to proceed with transfusion of blood components.    Electronically signed by Jon Bonner MD on 2/5/24 at 12:39 PM EST

## 2024-02-05 NOTE — PROGRESS NOTES
Physical Therapy  Facility/Department: [facility] ACUTE REHAB      NAME: Komal Crawford  : 1940 (83 y.o.)  MRN: [id number]  CODE STATUS: Full Code    Date of Service: 24      Past Medical History:   Diagnosis Date    Age-related cognitive decline     Ankle pain     Left ankle fracture in ortho boat.    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    Caffeine use     2 coffee/day, 1-2 tea per week    Cerebral artery occlusion with cerebral infarction (HCC)     Chronic back pain     Chronic ITP (idiopathic thrombocytopenia) (HCC)     CVA (cerebral infarction) ,     balance issues, short term memory loss    Diabetic gastroparesis (HCC)     Diverticular disease     GERD (gastroesophageal reflux disease)     Hiatal hernia     Hip fracture (HCC)     History of blood transfusion     History of decreased platelet count     Hyperlipemia     Hypertension     Internal hemorrhoid     Macular degeneration of left eye     S/P laser treatment    Nausea and vomiting     Neuropathy     Osteoarthritis     Ringing in ears     Self-catheterizes urinary bladder     6-7 times daily    TIA (transient ischemic attack) 2000    x1    Tubular adenoma     colon polyps    Type II or unspecified type diabetes mellitus without mention of complication, not stated as uncontrolled     Urinary incontinence     frequent UTI s/p infection, surgical dilatation lead to incontinence    Wears dentures     full on top, partial on bottom    Wears glasses      Past Surgical History:   Procedure Laterality Date    APPENDECTOMY      BLADDER SURGERY      bladder stimulator    BLADDER SUSPENSION      multiple    CARDIAC CATHETERIZATION      no stents    CARDIOVASCULAR STRESS TEST  2014    CATARACT REMOVAL WITH IMPLANT Left 2017    Tyree/Sulaiman    CATARACT REMOVAL WITH IMPLANT Right 2017    Tyree/Sulaiman

## 2024-02-05 NOTE — PLAN OF CARE
Problem: Discharge Planning  Goal: Discharge to home or other facility with appropriate resources  2/5/2024 0047 by Elroy Fontana RN  Outcome: Progressing  2/4/2024 1308 by Merle Hernández LPN  Outcome: Progressing     Problem: Safety - Adult  Goal: Free from fall injury  2/5/2024 0047 by Elroy Fontana RN  Outcome: Progressing  2/4/2024 1308 by Merle Hernández LPN  Outcome: Progressing  Flowsheets (Taken 2/4/2024 0804)  Free From Fall Injury: Instruct family/caregiver on patient safety     Problem: ABCDS Injury Assessment  Goal: Absence of physical injury  2/5/2024 0047 by Elroy Fontana RN  Outcome: Progressing  Flowsheets (Taken 2/5/2024 0047)  Absence of Physical Injury: Implement safety measures based on patient assessment  2/4/2024 1308 by Merle Hernández LPN  Outcome: Progressing  Flowsheets (Taken 2/4/2024 0804)  Absence of Physical Injury: Implement safety measures based on patient assessment     Problem: Skin/Tissue Integrity  Goal: Absence of new skin breakdown  Description: 1.  Monitor for areas of redness and/or skin breakdown  2.  Assess vascular access sites hourly  3.  Every 4-6 hours minimum:  Change oxygen saturation probe site  4.  Every 4-6 hours:  If on nasal continuous positive airway pressure, respiratory therapy assess nares and determine need for appliance change or resting period.  2/5/2024 0047 by Elroy Fontana RN  Outcome: Progressing  2/4/2024 1308 by Merle Hernández LPN  Outcome: Progressing     Problem: Chronic Conditions and Co-morbidities  Goal: Patient's chronic conditions and co-morbidity symptoms are monitored and maintained or improved  2/5/2024 0047 by Elroy Fontana RN  Outcome: Progressing  Flowsheets (Taken 2/5/2024 0047)  Care Plan - Patient's Chronic Conditions and Co-Morbidity Symptoms are Monitored and Maintained or Improved:   Monitor and assess patient's chronic conditions and comorbid symptoms for stability, deterioration, or improvement    Female

## 2024-02-06 LAB
GLUCOSE BLD-MCNC: 124 MG/DL (ref 65–105)
GLUCOSE BLD-MCNC: 136 MG/DL (ref 65–105)
GLUCOSE BLD-MCNC: 200 MG/DL (ref 65–105)
GLUCOSE BLD-MCNC: 210 MG/DL (ref 65–105)

## 2024-02-06 PROCEDURE — 97110 THERAPEUTIC EXERCISES: CPT

## 2024-02-06 PROCEDURE — 97530 THERAPEUTIC ACTIVITIES: CPT

## 2024-02-06 PROCEDURE — 82947 ASSAY GLUCOSE BLOOD QUANT: CPT

## 2024-02-06 PROCEDURE — 6370000000 HC RX 637 (ALT 250 FOR IP): Performed by: INTERNAL MEDICINE

## 2024-02-06 PROCEDURE — 97116 GAIT TRAINING THERAPY: CPT

## 2024-02-06 PROCEDURE — 1180000000 HC REHAB R&B

## 2024-02-06 PROCEDURE — 99233 SBSQ HOSP IP/OBS HIGH 50: CPT | Performed by: PHYSICAL MEDICINE & REHABILITATION

## 2024-02-06 PROCEDURE — 97535 SELF CARE MNGMENT TRAINING: CPT

## 2024-02-06 PROCEDURE — 6370000000 HC RX 637 (ALT 250 FOR IP): Performed by: PHYSICAL MEDICINE & REHABILITATION

## 2024-02-06 PROCEDURE — 99231 SBSQ HOSP IP/OBS SF/LOW 25: CPT | Performed by: INTERNAL MEDICINE

## 2024-02-06 RX ORDER — NITROFURANTOIN 25; 75 MG/1; MG/1
100 CAPSULE ORAL EVERY 12 HOURS SCHEDULED
Status: DISCONTINUED | OUTPATIENT
Start: 2024-02-06 | End: 2024-02-12 | Stop reason: HOSPADM

## 2024-02-06 RX ADMIN — PRIMIDONE 50 MG: 50 TABLET ORAL at 22:03

## 2024-02-06 RX ADMIN — HYDROCODONE BITARTRATE AND ACETAMINOPHEN 1 TABLET: 5; 325 TABLET ORAL at 11:10

## 2024-02-06 RX ADMIN — RANOLAZINE 500 MG: 500 TABLET, EXTENDED RELEASE ORAL at 22:02

## 2024-02-06 RX ADMIN — INSULIN LISPRO 2 UNITS: 100 INJECTION, SOLUTION INTRAVENOUS; SUBCUTANEOUS at 16:25

## 2024-02-06 RX ADMIN — INSULIN GLARGINE 10 UNITS: 100 INJECTION, SOLUTION SUBCUTANEOUS at 22:00

## 2024-02-06 RX ADMIN — ACETAMINOPHEN 650 MG: 325 TABLET, FILM COATED ORAL at 06:04

## 2024-02-06 RX ADMIN — ACETAMINOPHEN 650 MG: 325 TABLET, FILM COATED ORAL at 16:25

## 2024-02-06 RX ADMIN — RANOLAZINE 500 MG: 500 TABLET, EXTENDED RELEASE ORAL at 07:42

## 2024-02-06 RX ADMIN — RIVAROXABAN 10 MG: 10 TABLET, FILM COATED ORAL at 22:01

## 2024-02-06 RX ADMIN — ATORVASTATIN CALCIUM 10 MG: 10 TABLET, FILM COATED ORAL at 22:01

## 2024-02-06 RX ADMIN — PRIMIDONE 50 MG: 50 TABLET ORAL at 07:42

## 2024-02-06 RX ADMIN — CLOPIDOGREL BISULFATE 75 MG: 75 TABLET ORAL at 07:42

## 2024-02-06 RX ADMIN — ACETAMINOPHEN 650 MG: 325 TABLET, FILM COATED ORAL at 11:10

## 2024-02-06 RX ADMIN — HYDROCODONE BITARTRATE AND ACETAMINOPHEN 2 TABLET: 5; 325 TABLET ORAL at 14:46

## 2024-02-06 RX ADMIN — PANTOPRAZOLE SODIUM 40 MG: 40 TABLET, DELAYED RELEASE ORAL at 06:04

## 2024-02-06 RX ADMIN — GLIPIZIDE 5 MG: 10 TABLET ORAL at 06:04

## 2024-02-06 RX ADMIN — ROPINIROLE HYDROCHLORIDE 4 MG: 1 TABLET, FILM COATED ORAL at 22:00

## 2024-02-06 RX ADMIN — POLYETHYLENE GLYCOL 3350 17 G: 17 POWDER, FOR SOLUTION ORAL at 07:42

## 2024-02-06 ASSESSMENT — PAIN DESCRIPTION - LOCATION
LOCATION: KNEE
LOCATION: GENERALIZED

## 2024-02-06 ASSESSMENT — PAIN SCALES - GENERAL
PAINLEVEL_OUTOF10: 0
PAINLEVEL_OUTOF10: 8
PAINLEVEL_OUTOF10: 8

## 2024-02-06 ASSESSMENT — PAIN DESCRIPTION - ORIENTATION: ORIENTATION: LEFT

## 2024-02-06 ASSESSMENT — PAIN SCALES - WONG BAKER: WONGBAKER_NUMERICALRESPONSE: HURTS WHOLE LOT

## 2024-02-06 ASSESSMENT — PAIN DESCRIPTION - DESCRIPTORS: DESCRIPTORS: ACHING

## 2024-02-06 NOTE — PROGRESS NOTES
infusion, , IntraVENous, PRN  insulin glargine (LANTUS) injection vial 10 Units, 10 Units, SubCUTAneous, Nightly  midodrine (PROAMATINE) tablet 2.5 mg, 2.5 mg, Oral, TID PRN  glucose chewable tablet 16 g, 4 tablet, Oral, PRN  dextrose bolus 10% 125 mL, 125 mL, IntraVENous, PRN **OR** dextrose bolus 10% 250 mL, 250 mL, IntraVENous, PRN  glucagon injection 1 mg, 1 mg, SubCUTAneous, PRN  dextrose 10 % infusion, , IntraVENous, Continuous PRN  [Held by provider] linaCLOtide (LINZESS) capsule 72 mcg, 72 mcg, Oral, QAM AC  insulin lispro (HUMALOG) injection vial 0-8 Units, 0-8 Units, SubCUTAneous, TID WC  insulin lispro (HUMALOG) injection vial 0-4 Units, 0-4 Units, SubCUTAneous, Nightly  atorvastatin (LIPITOR) tablet 10 mg, 10 mg, Oral, Nightly  pantoprazole (PROTONIX) tablet 40 mg, 40 mg, Oral, QAM AC  [Held by provider] isosorbide mononitrate (IMDUR) extended release tablet 30 mg, 30 mg, Oral, QPM  [Held by provider] metoprolol succinate (TOPROL XL) extended release tablet 12.5 mg, 12.5 mg, Oral, QPM  primidone (MYSOLINE) tablet 50 mg, 50 mg, Oral, BID  ranolazine (RANEXA) extended release tablet 500 mg, 500 mg, Oral, BID  rOPINIRole (REQUIP) tablet 4 mg, 4 mg, Oral, Nightly  tiZANidine (ZANAFLEX) tablet 2 mg, 2 mg, Oral, Nightly PRN  glipiZIDE (GLUCOTROL) tablet 5 mg, 5 mg, Oral, QAM AC  clopidogrel (PLAVIX) tablet 75 mg, 75 mg, Oral, Daily  HYDROcodone-acetaminophen (NORCO) 5-325 MG per tablet 1 tablet, 1 tablet, Oral, Q4H PRN **OR** HYDROcodone-acetaminophen (NORCO) 5-325 MG per tablet 2 tablet, 2 tablet, Oral, Q4H PRN  acetaminophen (TYLENOL) tablet 650 mg, 650 mg, Oral, Q6H  polyethylene glycol (GLYCOLAX) packet 17 g, 17 g, Oral, Daily  rivaroxaban (XARELTO) tablet 10 mg, 10 mg, Oral, Daily  bisacodyl (DULCOLAX) suppository 10 mg, 10 mg, Rectal, Daily PRN  Facility-Administered Medications Ordered in Other Encounters: 0.9 % sodium chloride infusion 250 mL, 250 mL, IntraVENous, Once      Impression/Plan:   Impaired  8:41 AM      This note is created with the assistance of a speech recognition program.  While intending to generate a document that actually reflects the content of the visit, the document can still have some errors including those of syntax and sound a like substitutions which may escape proof reading.  In such instances, actual meaning can be extrapolated by contextual diversion.

## 2024-02-06 NOTE — PLAN OF CARE
Problem: Safety - Adult  Goal: Free from fall injury  Outcome: Progressing  Flowsheets (Taken 2/5/2024 0910)  Free From Fall Injury: Instruct family/caregiver on patient safety     Problem: ABCDS Injury Assessment  Goal: Absence of physical injury  Outcome: Progressing  Flowsheets (Taken 2/5/2024 0910)  Absence of Physical Injury: Implement safety measures based on patient assessment   - Safety maintained.     Problem: Skin/Tissue Integrity  Goal: Absence of new skin breakdown  Description: 1.  Monitor for areas of redness and/or skin breakdown  2.  Assess vascular access sites hourly  3.  Every 4-6 hours minimum:  Change oxygen saturation probe site  4.  Every 4-6 hours:  If on nasal continuous positive airway pressure, respiratory therapy assess nares and determine need for appliance change or resting period.  Outcome: Progressing   - Patient repositioned and worked well with therapy. Skin monitored.     Problem: Chronic Conditions and Co-morbidities  Goal: Patient's chronic conditions and co-morbidity symptoms are monitored and maintained or improved  Outcome: Progressing  Flowsheets (Taken 2/5/2024 0910)  Care Plan - Patient's Chronic Conditions and Co-Morbidity Symptoms are Monitored and Maintained or Improved: Monitor and assess patient's chronic conditions and comorbid symptoms for stability, deterioration, or improvement     Problem: Pain  Goal: Verbalizes/displays adequate comfort level or baseline comfort level  Outcome: Progressing   - Medicated for pain as ordered, as needed.     Problem: Nutrition Deficit:  Goal: Optimize nutritional status  Outcome: Progressing

## 2024-02-06 NOTE — PROGRESS NOTES
[facility] Occupational Therapy Daily Treatment Note    Date: 24  Patient Name: Komal Crawford       Room: 2608/2608-01  MRN: [id number]  Account: [id number]   : 1940  (83 y.o.) Gender: female          Diagnosis: Admitted with left femoral neck and shane-prosthetic femur fracture post fall,  L THR 24.   left hip fracture s/p total hip arthroplasty 24. , left distal femur fracture s/p total knee arthroplasty revision 24.  Additional Pertinent Hx: Expand All Collapse All    Physical Medicine & Rehabilitation History and Physical  [facility]      Primary care provider:  Sabiha Bedolla MD      Chief Complaint and Reason for Rehabilitation Admission:   ADL and mobility deficits secondary to left hip fracture s/p total hip arthroplasty, left distal femur fracture s/p total knee arthroplasty revision     History of Present Illness:  Komal Crawford is a 83 y.o. 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  She was originally admitted to [facility] on 24.     She initially presented after a mechanical fall when attempting to  her dog and her left leg gave out.  Denied hitting her head.  Initial x-rays did not show any pelvic or hip fractures but did show moderate to severe osteoarthritis of the left hip.  MRI left hip showed suspected posttraumatic marrow edema and probably impaction fracture at the superolateral left femoral head.  She was also found to have comminuted impacted fracture of the left distal femur.  She underwent left total hip arthroplasty on 24 (Dr. Bonner) and left total knee arthroplasty revision on 24 (Dr. Keyes).  She is WBAT to the left lower limb with  SS    Functional Mobility  Device: Wheelchair  Activity: To/From bathroom  Assistance Level: Dependent  Skilled Clinical Factors: SS    OT Exercises  Exercise Treatment: PM: Pt instructed in BUE exercises for facilitation of increased strength to support safety with ADLs and functional transfers. Pt completes with 2# free weights in all available planes, X15 reps each with RB's as needed. Next, Pt utilizes red resisitve thera-flex, up/down and twisting X 15 reps each.  Static Standing Balance Exercises: AM: Pt completed x 8 static stands during reeves care, tolerating 30 sec-1 min each.    Assessment  Assessment  Activity Tolerance: Patient limited by fatigue;Patient limited by pain;Patient limited by endurance (improvement noted this date)  Discharge Recommendations: Patient would benefit from continued therapy after discharge    Patient Education  Education  Education Given To: Patient  Education Provided: Plan of Care;Precautions;Safety;ADL Function;Mobility Training;Transfer Training;Equipment;DME/Home Modifications;Fall Prevention Strategies  Education Method: Demonstration;Verbal  Barriers to Learning: None  Education Outcome: Verbalized understanding;Demonstrated understanding    OT Equipment Recommendations  Other: TBD    Safety Devices  Safety Devices in place: Yes  Type of devices: Nurse notified;Left in chair (Left in w/c at therapy gym for PT session with family and PTA)       Goals  Patient Goals   Patient goals : needs to be able to stand to self cath for toileting  Short Term Goals  Time Frame for Short Term Goals: 1 week  Short Term Goal 1: set up UE dressing.  Short Term Goal 2: pt to clay 15 min seated EOB during adls, ex with SBA for static balance  Short Term Goal 3: Pt will tolerate standing 3+ minutes during functional activity with Max A  of 1 using marisol stedy.  Short Term Goal 4: min A with don pullups, pants over feet using equipment, mod x 2 to stand and don pants over hips using marisol

## 2024-02-06 NOTE — PLAN OF CARE
Problem: Discharge Planning  Goal: Discharge to home or other facility with appropriate resources  2/6/2024 0413 by Sophia Shafer, RN  Outcome: Progressing  Flowsheets (Taken 2/5/2024 2010)  Discharge to home or other facility with appropriate resources:   Identify barriers to discharge with patient and caregiver   Arrange for needed discharge resources and transportation as appropriate   Arrange for interpreters to assist at discharge as needed   Identify discharge learning needs (meds, wound care, etc)   Refer to discharge planning if patient needs post-hospital services based on physician order or complex needs related to functional status, cognitive ability or social support system  2/5/2024 1915 by Cha Mas, RN  Outcome: Progressing  Flowsheets (Taken 2/5/2024 0910)  Discharge to home or other facility with appropriate resources:   Identify barriers to discharge with patient and caregiver   Arrange for needed discharge resources and transportation as appropriate   Identify discharge learning needs (meds, wound care, etc)   Refer to discharge planning if patient needs post-hospital services based on physician order or complex needs related to functional status, cognitive ability or social support system     Problem: Safety - Adult  Goal: Free from fall injury  2/6/2024 0413 by Sophia Shafer, RN  Outcome: Progressing  2/5/2024 1915 by Cha Mas, RN  Outcome: Progressing  Flowsheets (Taken 2/5/2024 0910)  Free From Fall Injury: Instruct family/caregiver on patient safety     Problem: ABCDS Injury Assessment  Goal: Absence of physical injury  2/6/2024 0413 by Sophia Shafer, RN  Outcome: Progressing  Flowsheets (Taken 2/5/2024 2010)  Absence of Physical Injury: Implement safety measures based on patient assessment  2/5/2024 1915 by Cha Mas, RN  Outcome: Progressing  Flowsheets (Taken 2/5/2024 0910)  Absence of Physical Injury: Implement safety measures based on patient assessment      Problem: Skin/Tissue Integrity  Goal: Absence of new skin breakdown  Description: 1.  Monitor for areas of redness and/or skin breakdown  2.  Assess vascular access sites hourly  3.  Every 4-6 hours minimum:  Change oxygen saturation probe site  4.  Every 4-6 hours:  If on nasal continuous positive airway pressure, respiratory therapy assess nares and determine need for appliance change or resting period.  2/6/2024 0413 by Sophia Shafer, RN  Outcome: Progressing  2/5/2024 1915 by Cha Mas, RN  Outcome: Progressing     Problem: Chronic Conditions and Co-morbidities  Goal: Patient's chronic conditions and co-morbidity symptoms are monitored and maintained or improved  2/6/2024 0413 by Sophia Shafer RN  Outcome: Progressing  Flowsheets (Taken 2/5/2024 2010)  Care Plan - Patient's Chronic Conditions and Co-Morbidity Symptoms are Monitored and Maintained or Improved:   Monitor and assess patient's chronic conditions and comorbid symptoms for stability, deterioration, or improvement   Collaborate with multidisciplinary team to address chronic and comorbid conditions and prevent exacerbation or deterioration   Update acute care plan with appropriate goals if chronic or comorbid symptoms are exacerbated and prevent overall improvement and discharge  2/5/2024 1915 by Cha Mas, RN  Outcome: Progressing  Flowsheets (Taken 2/5/2024 0910)  Care Plan - Patient's Chronic Conditions and Co-Morbidity Symptoms are Monitored and Maintained or Improved: Monitor and assess patient's chronic conditions and comorbid symptoms for stability, deterioration, or improvement     Problem: Pain  Goal: Verbalizes/displays adequate comfort level or baseline comfort level  2/6/2024 0413 by Sophia Shafer RN  Outcome: Progressing  Flowsheets (Taken 2/5/2024 2039)  Verbalizes/displays adequate comfort level or baseline comfort level:   Encourage patient to monitor pain and request assistance   Assess pain using appropriate pain

## 2024-02-06 NOTE — CARE COORDINATION
Pt would like to be referred to the [facility]. Writer left a message on [facility]

## 2024-02-06 NOTE — PLAN OF CARE
Problem: Discharge Planning  Goal: Discharge to home or other facility with appropriate resources  2/6/2024 0413 by Sophia Shafer, RN  Outcome: Progressing  Flowsheets (Taken 2/5/2024 2010)  Discharge to home or other facility with appropriate resources:   Identify barriers to discharge with patient and caregiver   Arrange for needed discharge resources and transportation as appropriate   Arrange for interpreters to assist at discharge as needed   Identify discharge learning needs (meds, wound care, etc)   Refer to discharge planning if patient needs post-hospital services based on physician order or complex needs related to functional status, cognitive ability or social support system     Problem: Safety - Adult  Goal: Free from fall injury  2/6/2024 1231 by Faina Calix, RN  Outcome: Progressing  Flowsheets (Taken 2/6/2024 1231)  Free From Fall Injury:   Instruct family/caregiver on patient safety   Based on caregiver fall risk screen, instruct family/caregiver to ask for assistance with transferring infant if caregiver noted to have fall risk factors  2/6/2024 0413 by Sophia Shafer, RN  Outcome: Progressing

## 2024-02-06 NOTE — PROGRESS NOTES
Physical Therapy  Facility/Department: [facility] ACUTE REHAB      NAME: Komal Crawford  : 1940 (83 y.o.)  MRN: [id number]  CODE STATUS: Full Code    Date of Service: 24      Past Medical History:   Diagnosis Date    Age-related cognitive decline     Ankle pain     Left ankle fracture in ortho boat.    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    Caffeine use     2 coffee/day, 1-2 tea per week    Cerebral artery occlusion with cerebral infarction (HCC)     Chronic back pain     Chronic ITP (idiopathic thrombocytopenia) (HCC)     CVA (cerebral infarction) ,     balance issues, short term memory loss    Diabetic gastroparesis (HCC)     Diverticular disease     GERD (gastroesophageal reflux disease)     Hiatal hernia     Hip fracture (HCC)     History of blood transfusion     History of decreased platelet count     Hyperlipemia     Hypertension     Internal hemorrhoid     Macular degeneration of left eye     S/P laser treatment    Nausea and vomiting     Neuropathy     Osteoarthritis     Ringing in ears     Self-catheterizes urinary bladder     6-7 times daily    TIA (transient ischemic attack) 2000    x1    Tubular adenoma     colon polyps    Type II or unspecified type diabetes mellitus without mention of complication, not stated as uncontrolled     Urinary incontinence     frequent UTI s/p infection, surgical dilatation lead to incontinence    Wears dentures     full on top, partial on bottom    Wears glasses      Past Surgical History:   Procedure Laterality Date    APPENDECTOMY      BLADDER SURGERY      bladder stimulator    BLADDER SUSPENSION      multiple    CARDIAC CATHETERIZATION      no stents    CARDIOVASCULAR STRESS TEST  2014    CATARACT REMOVAL WITH IMPLANT Left 2017    Tyree/Sulaiman    CATARACT REMOVAL WITH IMPLANT Right 2017    Tyree/Sulaiman

## 2024-02-06 NOTE — PROGRESS NOTES
[facility]   IN-PATIENT SERVICE   [facility]  Consult             Date:   2/6/2024  Patient name:  Komal Crawford  Date of admission:  1/26/2024  5:17 PM  MRN:   [id number]  Account:  [id number]  YOB: 1940  PCP:    Sabiha Bedolla MD  Room:   [address] [place] [address]  Code Status:    Full Code    Chief Complaint:     No chief complaint on file.      History Obtained From:     patient    History of Present Illness:     83-year-old female recently discharged from ARU, presenting with fall left hip pain chronic sciatica, x-ray shows no fracture severe osteoarthritis left hip  History of T2DM HTN CAD status post stent 2022, chronic thrombocytopenia chronic anemia, recurrent UTI neurogenic bladder straight cath at home  Orthopedics consulted due for left hip MRI and lumbar MRI done, impacted fracture left femoral neck, status post repair on January 20, 2024, distal comminuted fracture left femur, status post surgery on January 23, aspirin Plavix was on hold,  Aspirin Plavix and Xarelto was on hold due to hemoglobin drop below 7, given unit of blood, hemoglobin came back to 8.4,,  Rechecked this morning on January 26 is 8.4 as well,  Platelet count went up from 1 33-1 49 today,  So far looking very good,  Patient will be discharged to acute rehab, accepted,  Will restart Plavix and Xarelto if okay with orthopedic surgery at this time, will hold aspirin until patient is on Xarelto,  Can go back to aspirin and Plavix once the Xarelto is off as per orthopedic surgery,    Patient now admitted at [facility] acute re[facility] for further care        Past Medical History:     Past Medical History:   Diagnosis Date    Age-related cognitive decline     Ankle pain     Left ankle fracture in ortho boat.    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  2/1    Patient seen and examined  Her vitals have been stable  Doppler lower extremity limited study but negative for DVT  Her hemoglobin has been stable  Will start p.o. iron  Resume Xarelto  Stool occult is negative  Blood sugars to r has been stable  Patient allergic to p.o. iron  Continue to monitor CBC  Will discontinue every 8 hourly checks    2/2  Blood pressure controlled, Xarelto was resumed yesterday check CBC to  Blood pressure in good range, no further hypoglycemic episode    2/3  Patient reports standing the wrong way earlier today resulting in increased pain to left knee and left ankle.  Reports ankle swelling . no fracture on left ankle x-ray done earlier today  Hemoglobin stable around 9  Blood pressure and blood sugar logs reviewed and in good range holding metoprolol  Patient participating in therapy  Blood pressure is low, patient has Winters, with dark urine-encouraged oral hydration       2/4  Blood pressure better today  Blood sugars controlled  Patient reports no new complaints. Engaging with physical therapy. Labs and vitals reviewed. No new issues. Continue with current care.     2/5  Blood pressure and blood sugar log reviewed and controlled  Patient reports no new complaints. Engaging with physical therapy. Labs and vitals reviewed. No new issues. Continue with current care.       2/6  Patient reports no new complaints. Engaging with physical therapy. Labs and vitals reviewed. No new issues. Continue with current care.         continue consultations:   IP CONSULT TO DIETITIAN  IP CONSULT TO SOCIAL WORK  IP CONSULT TO INTERNAL MEDICINE  IP CONSULT TO GI  IP CONSULT TO ORTHOPEDIC SURGERY     Patient is admitted as inpatient status because of co-morbidities listed above, severity of signs and symptoms as outlined, requirement for current medical therapies and most importantly because of direct risk to patient if care not provided in a hospital

## 2024-02-07 ENCOUNTER — APPOINTMENT (OUTPATIENT)
Dept: GENERAL RADIOLOGY | Age: 84
DRG: 560 | End: 2024-02-07
Attending: ORTHOPAEDIC SURGERY
Payer: MEDICARE

## 2024-02-07 ENCOUNTER — APPOINTMENT (OUTPATIENT)
Dept: CT IMAGING | Age: 84
DRG: 560 | End: 2024-02-07
Attending: STUDENT IN AN ORGANIZED HEALTH CARE EDUCATION/TRAINING PROGRAM
Payer: MEDICARE

## 2024-02-07 LAB
GLUCOSE BLD-MCNC: 117 MG/DL (ref 65–105)
GLUCOSE BLD-MCNC: 141 MG/DL (ref 65–105)
GLUCOSE BLD-MCNC: 145 MG/DL (ref 65–105)

## 2024-02-07 PROCEDURE — 97530 THERAPEUTIC ACTIVITIES: CPT

## 2024-02-07 PROCEDURE — 1180000000 HC REHAB R&B

## 2024-02-07 PROCEDURE — 82947 ASSAY GLUCOSE BLOOD QUANT: CPT

## 2024-02-07 PROCEDURE — 99232 SBSQ HOSP IP/OBS MODERATE 35: CPT | Performed by: INTERNAL MEDICINE

## 2024-02-07 PROCEDURE — 73560 X-RAY EXAM OF KNEE 1 OR 2: CPT

## 2024-02-07 PROCEDURE — 6370000000 HC RX 637 (ALT 250 FOR IP): Performed by: PHYSICAL MEDICINE & REHABILITATION

## 2024-02-07 PROCEDURE — 73700 CT LOWER EXTREMITY W/O DYE: CPT

## 2024-02-07 PROCEDURE — 97535 SELF CARE MNGMENT TRAINING: CPT

## 2024-02-07 PROCEDURE — 99232 SBSQ HOSP IP/OBS MODERATE 35: CPT | Performed by: PHYSICAL MEDICINE & REHABILITATION

## 2024-02-07 PROCEDURE — 6370000000 HC RX 637 (ALT 250 FOR IP): Performed by: INTERNAL MEDICINE

## 2024-02-07 PROCEDURE — 73502 X-RAY EXAM HIP UNI 2-3 VIEWS: CPT

## 2024-02-07 PROCEDURE — 97110 THERAPEUTIC EXERCISES: CPT

## 2024-02-07 PROCEDURE — 99213 OFFICE O/P EST LOW 20 MIN: CPT

## 2024-02-07 RX ADMIN — NITROFURANTOIN MONOHYDRATE/MACROCRYSTALS 100 MG: 75; 25 CAPSULE ORAL at 22:40

## 2024-02-07 RX ADMIN — GLIPIZIDE 5 MG: 10 TABLET ORAL at 06:25

## 2024-02-07 RX ADMIN — RIVAROXABAN 10 MG: 10 TABLET, FILM COATED ORAL at 22:44

## 2024-02-07 RX ADMIN — PRIMIDONE 50 MG: 50 TABLET ORAL at 22:44

## 2024-02-07 RX ADMIN — PRIMIDONE 50 MG: 50 TABLET ORAL at 08:37

## 2024-02-07 RX ADMIN — HYDROCODONE BITARTRATE AND ACETAMINOPHEN 2 TABLET: 5; 325 TABLET ORAL at 13:07

## 2024-02-07 RX ADMIN — ACETAMINOPHEN 650 MG: 325 TABLET, FILM COATED ORAL at 06:41

## 2024-02-07 RX ADMIN — NITROFURANTOIN MONOHYDRATE/MACROCRYSTALS 100 MG: 75; 25 CAPSULE ORAL at 08:37

## 2024-02-07 RX ADMIN — RANOLAZINE 500 MG: 500 TABLET, EXTENDED RELEASE ORAL at 22:44

## 2024-02-07 RX ADMIN — INSULIN GLARGINE 10 UNITS: 100 INJECTION, SOLUTION SUBCUTANEOUS at 22:39

## 2024-02-07 RX ADMIN — ACETAMINOPHEN 650 MG: 325 TABLET, FILM COATED ORAL at 17:27

## 2024-02-07 RX ADMIN — HYDROCODONE BITARTRATE AND ACETAMINOPHEN 2 TABLET: 5; 325 TABLET ORAL at 22:39

## 2024-02-07 RX ADMIN — RANOLAZINE 500 MG: 500 TABLET, EXTENDED RELEASE ORAL at 08:37

## 2024-02-07 RX ADMIN — CLOPIDOGREL BISULFATE 75 MG: 75 TABLET ORAL at 08:37

## 2024-02-07 RX ADMIN — ATORVASTATIN CALCIUM 10 MG: 10 TABLET, FILM COATED ORAL at 22:40

## 2024-02-07 RX ADMIN — PANTOPRAZOLE SODIUM 40 MG: 40 TABLET, DELAYED RELEASE ORAL at 06:25

## 2024-02-07 RX ADMIN — ROPINIROLE HYDROCHLORIDE 4 MG: 1 TABLET, FILM COATED ORAL at 22:40

## 2024-02-07 RX ADMIN — POLYETHYLENE GLYCOL 3350 17 G: 17 POWDER, FOR SOLUTION ORAL at 08:37

## 2024-02-07 ASSESSMENT — PAIN DESCRIPTION - LOCATION
LOCATION: KNEE
LOCATION: LEG

## 2024-02-07 ASSESSMENT — PAIN SCALES - GENERAL
PAINLEVEL_OUTOF10: 0
PAINLEVEL_OUTOF10: 4
PAINLEVEL_OUTOF10: 7

## 2024-02-07 ASSESSMENT — PAIN DESCRIPTION - ORIENTATION: ORIENTATION: LEFT

## 2024-02-07 NOTE — PROGRESS NOTES
[facility] Rehabilitation Occupational Therapy Daily Treatment Note    Date: 24  Patient Name: Komal Crawford       Room: 2608/2608-01  MRN: [id number]  Account: [id number]   : 1940  (83 y.o.) Gender: female          Diagnosis: Admitted with left femoral neck and shane-prosthetic femur fracture post fall,  L THR 24.   left hip fracture s/p total hip arthroplasty 24. , left distal femur fracture s/p total knee arthroplasty revision 24.  Additional Pertinent Hx: Expand All Collapse All    Physical Medicine & Rehabilitation History and Physical  [facility]      Primary care provider:  Sabiha Bedolla MD      Chief Complaint and Reason for Rehabilitation Admission:   ADL and mobility deficits secondary to left hip fracture s/p total hip arthroplasty, left distal femur fracture s/p total knee arthroplasty revision     History of Present Illness:  Komal Crawford is a 83 y.o. 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  She was originally admitted to [place] on 24.     She initially presented after a mechanical fall when attempting to  her dog and her left leg gave out.  Denied hitting her head.  Initial x-rays did not show any pelvic or hip fractures but did show moderate to severe osteoarthritis of the left hip.  MRI left hip showed suspected posttraumatic marrow edema and probably impaction fracture at the superolateral left femoral head.  She was also found to have comminuted impacted fracture of the left distal femur.  She underwent left total hip arthroplasty on 24 (Dr. Bonner) and left total knee arthroplasty revision on 24 (Dr. Keyes).  She is WBAT to the left lower limb with  posterior hip precautions.  She required transfusion for acute blood loss anemia.     Of note, she was recently admitted to acute rehab 1/2/24 to 1/9/24 for debility secondary to UTI and was ambulating 140 feet with SBA upon discharge.    Treatment Diagnosis: Impaired self care status post L hip and L knee surgery. pt with severe mobility, work clay deficits impacting adls.    Past Medical History:  has a past medical history of Age-related cognitive decline, Ankle pain, Anxiety, B12 deficiency, Winters esophagus, 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[facility]), Caffeine use, Cerebral artery occlusion with cerebral infarction ([facility]), Chronic back pain, Chronic ITP (idiopathic thrombocytopenia) ([facility]), CVA (cerebral infarction), Diabetic gastroparesis ([facility]), Diverticular disease, GERD (gastroesophageal reflux disease), Hiatal hernia, Hip fracture ([facility]), History of blood transfusion, History of decreased platelet count, Hyperlipemia, Hypertension, Internal hemorrhoid, Macular degeneration of left eye, Nausea and vomiting, Neuropathy, Osteoarthritis, Ringing in ears, Self-catheterizes urinary bladder, TIA (transient ischemic attack), Tubular adenoma, Type II or unspecified type diabetes mellitus without mention of complication, not stated as uncontrolled, Urinary incontinence, Wears dentures, and Wears glasses.    Past Surgical History:   has a past surgical history that includes bladder suspension (2002); Hysterectomy (1969); Tonsillectomy (1978); Appendectomy (1962); Spine surgery (2010); colostomy (1986); Revision Colostomy (1986); Spine surgery (1990); Upper gastrointestinal endoscopy; Bladder surgery; Upper gastrointestinal endoscopy (05/05/2014); cardiovascular stress test (12/2014); Colon surgery; Total abdominal hysterectomy w/ bilateral salpingoophorectomy; fracture surgery (Right, 2011); fracture surgery (Left, 2001); fracture surgery (Left, 2013); Cardiac catheterization (2008); Revision total knee arthroplasty (Right,

## 2024-02-07 NOTE — CARE COORDINATION
ARU CASE MANAGEMENT NOTE:    Patient is alert and oriented x4.    Spoke with patient regarding discharge plan: SNF- [facility]. A referral was sent to [facility].     Outside appointments while in ARU: None    Will continue to follow for additional discharge needs.      Electronically signed by Herb Monroe RN on 2/7/2024 at 11:11 AM

## 2024-02-07 NOTE — PROGRESS NOTES
Physical Therapy  Facility/Department: [facility] ACUTE REHAB  Rehabilitation Physical Therapy Treatment Note    NAME: Komal Crawford  : 1940 (83 y.o.)  MRN: [id number]  CODE STATUS: Full Code    Date of Service: 24       Restrictions:  Restrictions/Precautions: Fall Risk, Weight Bearing, Contact Precautions, Bed Alarm, Up as Tolerated  Lower Extremity Weight Bearing Restrictions  Right Lower Extremity Weight Bearing: Weight Bearing As Tolerated  Left Lower Extremity Weight Bearing: Weight Bearing As Tolerated  Position Activity Restriction  Hip Precautions: Posterior hip precautions  Other position/activity restrictions: Pt had a (L) ISHAN  by Dr. JORDYN Bonner on 2024. Pt had surgery on 2024 due to a Periprosthetic fracture of femur at tip of prosthesis for a (L) TKA revision by Dr. AMEE Keyes.     SUBJECTIVE  Subjective  Subjective: Pt seated in WC agreeable to tx, family present and  stays for tx.  Pain: 8/10 in L LE       OBJECTIVE  Cognition  Overall Cognitive Status: WFL  Orientation  Overall Orientation Status: Within Functional Limits    Functional Mobility  Transfers  Surface: Wheelchair (<>NuStep)  Additional Factors: With handrails;Increased time to complete  Device:  (Magda Steady.)  Sit to Stand  Assistance Level: Requires x 2 assistance;Maximum assistance  Skilled Clinical Factors: MOD A x2 prgressing to MAX A x2 with fatigue  Stand to Sit  Assistance Level: Minimal assistance  Stand Pivot  Assistance Level: Dependent (Magda Steady)      Environmental Mobility  Wheelchair  Surface: Level surface  Device: Standard wheelchair  Additional Factors: Increased time to complete  Assistance Required to Manage Parts: Left leg rest;Right leg rest  Assistance Level for Propulsion: Minimal assistance  Propulsion Method: Bilateral lower extremities;Bilateral upper extremities  Propulsion Quality: Slow velocity;Short strokes;Veers left;Decreased fluidity  Propulsion Distance: 80ft (, rest breaks required every

## 2024-02-07 NOTE — PROGRESS NOTES
symptoms as outlined, requirement for current medical therapies and most importantly because of direct risk to patient if care not provided in a hospital setting.    Heydi Maya MD  2/7/2024  3:39 PM    Copy sent to Sabiha Christina MD    Please note that this chart was generated using voice recognition Dragon dictation software.  Although every effort was made to ensure the accuracy of this automated transcription, some errors in transcription may have occurred.    no syncope

## 2024-02-07 NOTE — PROGRESS NOTES
Physical Medicine & Rehabilitation  Progress Note      Subjective:      83 year-old female with L hip fracture treated by ISHAN and L distal femur fracture treated by TKA revision. Patient is noting some gradual improvement in therapy but still requiring significant assistance. Winters indwelling. No new issues with sleep, appetite, or bowel.     ROS:  Denies fevers, chills, sweats.  No chest pain, palpitations, lightheadedness.  Denies coughing, wheezing or shortness of breath.  Denies abdominal pain, nausea, diarrhea or constipation.  No new areas of joint pain.  Denies new areas of numbness or weakness.  Denies new anxiety or depression issues.  No new skin problems.    Rehabilitation:       PT:    Bed mobility  Bridging:  (Pt did not want to try mobility in p.m. d/t increased pain L LE)  Rolling to Left: Maximum assistance  Rolling to Right: Maximum assistance  Supine to Sit: Moderate assistance  Sit to Supine: Moderate assistance  Scooting: Minimal assistance  Bed Mobility Comments: Patient up in recliner prior to  AM session and returned to recliner post.         Transfers  Sit to Stand: Maximum Assistance (on Magda Stedy for in // bars.)  Stand to Sit: Maximum Assistance  Bed to Chair: Dependent/Total (using Magda Stey.  Requires 2 person assist for stand on Magda Mod-Max A x 2.  Poor posture and flexed forward when standing.)  Stand Pivot Transfers: Dependent/Total (Magda Stedy;  Attempted RW Max A x 2 but not safe.  Patient having difficulty shifting and turning.)  Squat Pivot Transfers: Dependent/Total (Patient fearful and required second person.  Max x 2 with verbal and tactile cues.)  Comment: N/A -attempted sit to stand transfers x 2/unable even with the bed height increased.         Ambulation  WB Status: (L) LE WBAT (Pt not able stand long enough to attempt gait.)  Ambulation  Surface: Level tile  Device: Parallel Bars  Other Apparatus: Wheelchair follow  Assistance: Maximum assistance, 2 Person

## 2024-02-07 NOTE — PROGRESS NOTES
[facility] Continence Nursing  Progress Note      NAME:  Komal Crawford  MEDICAL RECORD NUMBER:  [id number]  AGE: 83 y.o.   GENDER: female  : 1940  TODAY'S DATE:  2024    [facility] nurse follow up visit for wound to buttocks. Pt tearful this morning about her progress with therapy and going to SNF. Encouragement offered, as two weeks ago she was unable to stand without the assistance of 2-3 people and today she stood unassisted with the use of the marisol steady and therapist at her side. Buttocks assessed. Denudation has improved significantly since last assessment. Previous purple discoloration has evolved and is now a small cluster of open areas covered in a thin layer of fibrinous slough. Will continue triad cream.       Measurements:  Wound 24 Sacrum cluster (Active)   Wound Image   24 1008   Wound Etiology Pressure Unstageable 24 1008   Dressing Status Old drainage noted;New dressing applied 24 1008   Wound Cleansed Soap and water 24 1008   Dressing/Treatment Triad hydro/zinc oxide-based hydrophilic paste 24 1008   Wound Length (cm) 2.5 cm 24 1008   Wound Width (cm) 4.5 cm 24 1008   Wound Depth (cm) 0.1 cm 24 1008   Wound Surface Area (cm^2) 11.25 cm^2 24 1008   Change in Wound Size % (l*w) 71.88 24 1008   Wound Volume (cm^3) 1.125 cm^3 24 1008   Wound Healing % 72 24 1008   Wound Assessment Fibrinous;Slough;Denuded 24 1008   Drainage Amount Scant (moist but unmeasurable) 24 1008   Drainage Description Serosanguinous 24 1008   Odor None 24 100   Carolina-wound Assessment Blanchable erythema;Denuded 24 1008   Margins Attached edges 24 1008   Number of days: 13     Anu Henderson, JAYAN, RN, CWOCN, WCC, DAPWCA  [facility]  Wound, Ostomy, and Continence Nursing  [phone]

## 2024-02-08 ENCOUNTER — CARE COORDINATION (OUTPATIENT)
Dept: CARE COORDINATION | Age: 84
End: 2024-02-08

## 2024-02-08 ENCOUNTER — TELEPHONE (OUTPATIENT)
Dept: ORTHOPEDIC SURGERY | Age: 84
End: 2024-02-08

## 2024-02-08 LAB
GLUCOSE BLD-MCNC: 104 MG/DL (ref 65–105)
GLUCOSE BLD-MCNC: 107 MG/DL (ref 65–105)
GLUCOSE BLD-MCNC: 129 MG/DL (ref 65–105)
GLUCOSE BLD-MCNC: 179 MG/DL (ref 65–105)

## 2024-02-08 PROCEDURE — 6370000000 HC RX 637 (ALT 250 FOR IP): Performed by: INTERNAL MEDICINE

## 2024-02-08 PROCEDURE — 97116 GAIT TRAINING THERAPY: CPT

## 2024-02-08 PROCEDURE — 6370000000 HC RX 637 (ALT 250 FOR IP): Performed by: PHYSICAL MEDICINE & REHABILITATION

## 2024-02-08 PROCEDURE — 82947 ASSAY GLUCOSE BLOOD QUANT: CPT

## 2024-02-08 PROCEDURE — 97110 THERAPEUTIC EXERCISES: CPT

## 2024-02-08 PROCEDURE — 97530 THERAPEUTIC ACTIVITIES: CPT

## 2024-02-08 PROCEDURE — 99231 SBSQ HOSP IP/OBS SF/LOW 25: CPT | Performed by: INTERNAL MEDICINE

## 2024-02-08 PROCEDURE — 1180000000 HC REHAB R&B

## 2024-02-08 PROCEDURE — 99232 SBSQ HOSP IP/OBS MODERATE 35: CPT | Performed by: PHYSICAL MEDICINE & REHABILITATION

## 2024-02-08 PROCEDURE — 97535 SELF CARE MNGMENT TRAINING: CPT

## 2024-02-08 RX ORDER — NYSTATIN 100000 [USP'U]/G
POWDER TOPICAL
Qty: 45 G | Refills: 3 | Status: SHIPPED | OUTPATIENT
Start: 2024-02-08

## 2024-02-08 RX ADMIN — PRIMIDONE 50 MG: 50 TABLET ORAL at 21:24

## 2024-02-08 RX ADMIN — RANOLAZINE 500 MG: 500 TABLET, EXTENDED RELEASE ORAL at 09:18

## 2024-02-08 RX ADMIN — RANOLAZINE 500 MG: 500 TABLET, EXTENDED RELEASE ORAL at 21:24

## 2024-02-08 RX ADMIN — ACETAMINOPHEN 650 MG: 325 TABLET, FILM COATED ORAL at 06:09

## 2024-02-08 RX ADMIN — DICLOFENAC SODIUM 4 G: 10 GEL TOPICAL at 21:20

## 2024-02-08 RX ADMIN — PRIMIDONE 50 MG: 50 TABLET ORAL at 09:18

## 2024-02-08 RX ADMIN — HYDROCODONE BITARTRATE AND ACETAMINOPHEN 2 TABLET: 5; 325 TABLET ORAL at 13:48

## 2024-02-08 RX ADMIN — TIZANIDINE 2 MG: 2 TABLET ORAL at 21:19

## 2024-02-08 RX ADMIN — PANTOPRAZOLE SODIUM 40 MG: 40 TABLET, DELAYED RELEASE ORAL at 06:09

## 2024-02-08 RX ADMIN — ROPINIROLE HYDROCHLORIDE 4 MG: 1 TABLET, FILM COATED ORAL at 21:19

## 2024-02-08 RX ADMIN — RIVAROXABAN 10 MG: 10 TABLET, FILM COATED ORAL at 21:20

## 2024-02-08 RX ADMIN — HYDROCODONE BITARTRATE AND ACETAMINOPHEN 2 TABLET: 5; 325 TABLET ORAL at 09:18

## 2024-02-08 RX ADMIN — DICLOFENAC SODIUM 4 G: 10 GEL TOPICAL at 13:47

## 2024-02-08 RX ADMIN — ATORVASTATIN CALCIUM 10 MG: 10 TABLET, FILM COATED ORAL at 21:19

## 2024-02-08 RX ADMIN — NITROFURANTOIN MONOHYDRATE/MACROCRYSTALS 100 MG: 75; 25 CAPSULE ORAL at 09:18

## 2024-02-08 RX ADMIN — ACETAMINOPHEN 650 MG: 325 TABLET, FILM COATED ORAL at 11:35

## 2024-02-08 RX ADMIN — CLOPIDOGREL BISULFATE 75 MG: 75 TABLET ORAL at 09:18

## 2024-02-08 RX ADMIN — MIDODRINE HYDROCHLORIDE 2.5 MG: 2.5 TABLET ORAL at 21:19

## 2024-02-08 RX ADMIN — INSULIN GLARGINE 10 UNITS: 100 INJECTION, SOLUTION SUBCUTANEOUS at 21:18

## 2024-02-08 RX ADMIN — NITROFURANTOIN MONOHYDRATE/MACROCRYSTALS 100 MG: 75; 25 CAPSULE ORAL at 21:24

## 2024-02-08 RX ADMIN — SENNOSIDES 17.2 MG: 8.6 TABLET, FILM COATED ORAL at 21:19

## 2024-02-08 RX ADMIN — POLYETHYLENE GLYCOL 3350 17 G: 17 POWDER, FOR SOLUTION ORAL at 09:18

## 2024-02-08 RX ADMIN — GLIPIZIDE 5 MG: 10 TABLET ORAL at 06:09

## 2024-02-08 ASSESSMENT — PAIN SCALES - GENERAL
PAINLEVEL_OUTOF10: 8
PAINLEVEL_OUTOF10: 7
PAINLEVEL_OUTOF10: 0
PAINLEVEL_OUTOF10: 0

## 2024-02-08 ASSESSMENT — PAIN DESCRIPTION - DESCRIPTORS
DESCRIPTORS: SHARP
DESCRIPTORS: BURNING

## 2024-02-08 ASSESSMENT — PAIN DESCRIPTION - LOCATION
LOCATION: ANKLE
LOCATION: HIP

## 2024-02-08 ASSESSMENT — PAIN DESCRIPTION - ORIENTATION
ORIENTATION: LEFT
ORIENTATION: LEFT

## 2024-02-08 NOTE — TELEPHONE ENCOUNTER
The patient had left ISHAN with Dr. Bonner on 1/20/24 and a left TKA/revision with Dr. Keyes on 1/23/24.     She did not have any post op visits scheduled. Scheduled the patient to f/u with Gisela Cazares PA-C on Tuesday 2/13 and Christo Cedillo PA-C on Wednesday 2/14.     Contacted the Acute rehab facility at [phone]. The nurse  stated that Dr. Bonner actually rounded on the patient today and recommended 2 week f/u. The patient's sutures were also removed at the acute rehab facility today. Therefore, will cancel the appointment with Gisela. She states that the patient's knee does not have a bandage on it or any sutures or staples and states that the incision looks good.     The nurse  states that the patient is being transported to the [facility] tomorrow or Monday. Tried to communicate the patient's f/u sagar with Christo, but she recommended contacting the [facility].     Called the [facility] and left a voicemail for their  communicating the post op visit on 2/14 at 2:30 pm, and if they'd like they may call us back.

## 2024-02-08 NOTE — TELEPHONE ENCOUNTER
[facility] called regarding patient, who will be moving to a different facility on 2/9/24.    Patient still has her sutures in and the facility called with concern and to ask about removal.    Patient does not have a Post Op scheduled.at this time.

## 2024-02-08 NOTE — PLAN OF CARE
Problem: Discharge Planning  Goal: Discharge to home or other facility with appropriate resources  Outcome: Progressing     Problem: Safety - Adult  Goal: Free from fall injury  Outcome: Progressing  Flowsheets (Taken 2/8/2024 1013 by Charu Herrera RN)  Free From Fall Injury: Instruct family/caregiver on patient safety     Problem: ABCDS Injury Assessment  Goal: Absence of physical injury  Outcome: Progressing  Flowsheets (Taken 2/8/2024 1013 by Charu Herrera RN)  Absence of Physical Injury: Implement safety measures based on patient assessment     Problem: Pain  Goal: Verbalizes/displays adequate comfort level or baseline comfort level  Outcome: Progressing

## 2024-02-08 NOTE — PROGRESS NOTES
Physical Medicine & Rehabilitation  Progress Note      Subjective:      83 year-old female with L hip fracture treated by ISHAN and L distal femur fracture treated by TKA revision. Patient is observed ambulating in therapy with upright rollator with B platforms. She is having moderate-severe lateral L ankle pain. L knee and hip pain is responding to current medications.     ROS:  Denies fevers, chills, sweats.  No chest pain, palpitations, lightheadedness.  Denies coughing, wheezing or shortness of breath.  Denies abdominal pain, nausea, diarrhea or constipation.  No new areas of joint pain.  Denies new areas of numbness or weakness.  Denies new anxiety or depression issues.  She is c/o blisters on proximal L thigh    Rehabilitation:       PT:    Bed mobility  Bridging:  (Pt did not want to try mobility in p.m. d/t increased pain L LE)  Rolling to Left: Maximum assistance  Rolling to Right: Maximum assistance  Supine to Sit: Moderate assistance  Sit to Supine: Moderate assistance  Scooting: Minimal assistance  Bed Mobility Comments: Patient up in recliner prior to  AM session and returned to recliner post.         Transfers  Sit to Stand: Maximum Assistance (on Magda Stedy for in // bars.)  Stand to Sit: Maximum Assistance  Bed to Chair: Dependent/Total (using Magda Stey.  Requires 2 person assist for stand on Magda Mod-Max A x 2.  Poor posture and flexed forward when standing.)  Stand Pivot Transfers: Dependent/Total (Magda Stedy;  Attempted RW Max A x 2 but not safe.  Patient having difficulty shifting and turning.)  Squat Pivot Transfers: Dependent/Total (Patient fearful and required second person.  Max x 2 with verbal and tactile cues.)  Comment: N/A -attempted sit to stand transfers x 2/unable even with the bed height increased.  Transfers  Surface: Wheelchair (<>NuStep)  Additional Factors: With handrails, Increased time to complete  Device:  (Magda Steady.)  Sit to Stand  Assistance Level: Requires x 2 assistance,  posterior hip precautions PT/OT  for gait, mobility, strengthening, endurance, ADLs, and self care. Has prn Tylenol, Tizanidine, Norco, Tylenol for pain. Nursing to confirm timing for staple removal with Dr. Bonner - if possible patient would like them removed prior to discharge to SNF.   L distal femur fracture: s/p L TKA revision by Dr. Keyes 1/23/24. WBAT LLE.   L ankle sprain: likely soft tissue injury. CT 2/7 negative.Will start Voltaren gel  Acute blood loss anemia: Hb improved and stable.   Neurogenic bladder/urine retention: history benign spinal cord tumor. Performs self cath at home. Has Winters currently due to painful catheterizations. Anticipate until her mobility improves in LLE that she will require Winters then she can resume her intermittent self cath.  On chronic Macrobid for prophylaxis.   Hx CVA: on Plavix, atorvastatin. ASA on hold while on Xarelto  CAD: s/p stent.   HTN/HLD: metoprolol, Imdur on hold.   DM II: on glipizide, Lantus, sliding scale.  Chronic ITP: Monitoring  GERD: on pantoprazole:  Tremors: on primidone  RLS: on Requip  Anxiety/Depression: psychiatry consult declined. She is managing with spiritual care.   Sacral wound: wound care following  Obesity: BMI 36.47. Dietician following  Chronic constipation/neurogenic Bowel Management: Miralax daily, Senokot prn, Dulcolax prn. Linzess on hold  Internal medicine for medical management  DVT prophylaxis:  on Xarelto - to resume ASA when Xarelto completed  Follow up: PCP 1-2 weeks, Orthopedic surgery.    arranging SNF placement - patient will need Winters management and further therapy before she is at a level which can be safely managed at home.        Electronically signed by BETTY DUBOSE MD on 2/8/2024 at 9:07 AM      This note is created with the assistance of a speech recognition program.  While intending to generate a document that actually reflects the content of the visit, the document can still have some errors including

## 2024-02-08 NOTE — PROGRESS NOTES
[facility]   Acute Rehabilitation Occupational Therapy Daily Treatment Note    Date: 24  Patient Name: Komal Crawford       Room: 2608/2608-01  MRN: [id number]  Account: [id number]   : 1940  (83 y.o.) Gender: female          Diagnosis: Admitted with left femoral neck and shane-prosthetic femur fracture post fall,  L THR 24.   left hip fracture s/p total hip arthroplasty 24. , left distal femur fracture s/p total knee arthroplasty revision 24.  24 per Dr. Johnson: L ankle sprain: likely soft tissue injury. CT  negative.Will start Voltaren gel  Additional Pertinent Hx: Expand All Collapse All    Physical Medicine & Rehabilitation History and Physical  [facility]      Primary care provider:  Sabiha Bedolla MD      Chief Complaint and Reason for Rehabilitation Admission:   ADL and mobility deficits secondary to left hip fracture s/p total hip arthroplasty, left distal femur fracture s/p total knee arthroplasty revision     History of Present Illness:  Komal Crawford is a 83 y.o. 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  She was originally admitted to [place] on 24.     She initially presented after a mechanical fall when attempting to  her dog and her left leg gave out.  Denied hitting her head.  Initial x-rays did not show any pelvic or hip fractures but did show moderate to severe osteoarthritis of the left hip.  MRI left hip showed suspected posttraumatic marrow edema and probably impaction fracture at the superolateral left femoral head.  She was also found to have comminuted impacted fracture of the left distal femur.  She underwent left total hip arthroplasty on 24 (Dr. Bonner)

## 2024-02-08 NOTE — PROGRESS NOTES
Patient was calm, glad to see her  Ritchie.  Patient report feeling tired, she had just completed therapy.  Spiritual Care will remain available as needed.     02/08/24 1155   Encounter Summary   Encounter Overview/Reason  Spiritual/Emotional Needs   Service Provided For: Patient and family together  (Spouse)   Referral/Consult From: [facility]   Support System Spouse   Last Encounter  02/08/24   Complexity of Encounter Low   Begin Time 1155   End Time  1210   Total Time Calculated 15 min   Spiritual/Emotional needs   Type Spiritual Support   Assessment/Intervention/Outcome   Assessment Calm;Powerlessness   Intervention Active listening;Discussed illness injury and it’s impact;Explored/Affirmed feelings, thoughts, concerns;Prayer (assurance of)/[place];Sustaining Presence/Ministry of presence   Outcome Engaged in conversation;Expressed feelings, needs, and concerns;Expressed Gratitude;Receptive

## 2024-02-08 NOTE — PROGRESS NOTES
Surgical Progress Note    POD:      Patient doing fairly well  Vitals:    24 0845   BP: 126/86   Pulse: 83   Resp: 18   Temp: 98.1 °F (36.7 °C)   SpO2: 99%      Temp (24hrs), Av.3 °F (36.8 °C), Min:98.1 °F (36.7 °C), Max:98.6 °F (37 °C)       Pain Control good  No unusual nausea    Exam: no change  X-ray s/p tereso and hinged tka stable        Lungs:  No respiratory distress    Labs reviewed:  Labs:                I/O last 3 completed shifts:  In: -   Out: 1225 [Urine:1225]    Assessment:    Patient Active Problem List   Diagnosis    TIA (transient ischemic attack)    Chronic back pain    Diabetic peripheral neuropathy ([facility])    Macular degeneration of left eye    Constipation    Thrombocytopenia ([facility])    B12 deficiency    Vitamin D deficiency    Anemia    DDD (degenerative disc disease), cervical    Age-related cognitive decline    Acquired cyst of kidney    Microscopic hematuria    Bilateral renal cysts    Essential hypertension    Gastroesophageal reflux disease without esophagitis    Mixed incontinence    Recurrent UTI    Pure hypercholesterolemia    Hiatal hernia    Diabetic gastroparesis ([facility])    Internal hemorrhoid    Tubular adenoma    Colon polyps    Urge incontinence    S/P lumbar fusion    Chronic ITP (idiopathic thrombocytopenia) ([facility])    Urinary retention    Unsteadiness    Anxiety    Arthritis    Nausea    Diarrhea    Extrarenal pelvis    Primary osteoarthritis of left knee    Type 2 diabetes mellitus with diabetic peripheral angiopathy without gangrene, without long-term current use of insulin ([facility])    Memory loss    Urethral pain    Bladder spasms    Atrophic vaginitis    Coronary artery disease involving native coronary artery of native heart without angina pectoris    Chronic fatigue    Essential tremor    Neuropathy    Coronary angioplasty status    Recurrent major depressive disorder, in remission ([facility])    Inability to walk    Presence of drug-eluting stent in right coronary artery

## 2024-02-08 NOTE — PLAN OF CARE
Problem: Discharge Planning  Goal: Discharge to home or other facility with appropriate resources  Outcome: Progressing  Flowsheets (Taken 2/5/2024 2010 by Sophia Shafer, RN)  Discharge to home or other facility with appropriate resources:   Identify barriers to discharge with patient and caregiver   Arrange for needed discharge resources and transportation as appropriate   Arrange for interpreters to assist at discharge as needed   Identify discharge learning needs (meds, wound care, etc)   Refer to discharge planning if patient needs post-hospital services based on physician order or complex needs related to functional status, cognitive ability or social support system     Problem: Safety - Adult  Goal: Free from fall injury  Outcome: Progressing  Flowsheets (Taken 2/7/2024 0800 by Vane Ac, RN)  Free From Fall Injury:   Instruct family/caregiver on patient safety   Based on caregiver fall risk screen, instruct family/caregiver to ask for assistance with transferring infant if caregiver noted to have fall risk factors     Problem: ABCDS Injury Assessment  Goal: Absence of physical injury  Outcome: Progressing  Flowsheets (Taken 2/7/2024 0800 by Vane Ac, RN)  Absence of Physical Injury: Implement safety measures based on patient assessment     Problem: Skin/Tissue Integrity  Goal: Absence of new skin breakdown  Description: 1.  Monitor for areas of redness and/or skin breakdown  2.  Assess vascular access sites hourly  3.  Every 4-6 hours minimum:  Change oxygen saturation probe site  4.  Every 4-6 hours:  If on nasal continuous positive airway pressure, respiratory therapy assess nares and determine need for appliance change or resting period.  Outcome: Progressing     Problem: Chronic Conditions and Co-morbidities  Goal: Patient's chronic conditions and co-morbidity symptoms are monitored and maintained or improved  Outcome: Progressing     Problem: Pain  Goal: Verbalizes/displays adequate comfort  level or baseline comfort level  Outcome: Progressing  Flowsheets (Taken 2/7/2024 1728 by Vane Ac, RN)  Verbalizes/displays adequate comfort level or baseline comfort level:   Assess pain using appropriate pain scale   Administer analgesics based on type and severity of pain and evaluate response   Implement non-pharmacological measures as appropriate and evaluate response   Consider cultural and social influences on pain and pain management   Encourage patient to monitor pain and request assistance     Problem: Nutrition Deficit:  Goal: Optimize nutritional status  Outcome: Progressing

## 2024-02-08 NOTE — TELEPHONE ENCOUNTER
Rx was denied on 2/2, (not sure why) but pt states she is in the hospital and has a really bad rash and they do not have nystatin there for her. She states what little she does have left will not be enough.   She is asking if you will please send to pharmacy       Please Approve or deny

## 2024-02-08 NOTE — PROGRESS NOTES
[facility]   IN-PATIENT SERVICE   [facility]  Consult             Date:   2/8/2024  Patient name:  Komal Crawford  Date of admission:  1/26/2024  5:17 PM  MRN:   [id number]  Account:  [id number]  YOB: 1940  PCP:    Sabiha Bedolla MD  Room:   [address] [place] [address]  Code Status:    Full Code    Chief Complaint:     No chief complaint on file.      History Obtained From:     patient    History of Present Illness:     83-year-old female recently discharged from ARU, presenting with fall left hip pain chronic sciatica, x-ray shows no fracture severe osteoarthritis left hip  History of T2DM HTN CAD status post stent 2022, chronic thrombocytopenia chronic anemia, recurrent UTI neurogenic bladder straight cath at home  Orthopedics consulted due for left hip MRI and lumbar MRI done, impacted fracture left femoral neck, status post repair on January 20, 2024, distal comminuted fracture left femur, status post surgery on January 23, aspirin Plavix was on hold,  Aspirin Plavix and Xarelto was on hold due to hemoglobin drop below 7, given unit of blood, hemoglobin came back to 8.4,,  Rechecked this morning on January 26 is 8.4 as well,  Platelet count went up from 1 33-1 49 today,  So far looking very good,  Patient will be discharged to acute rehab, accepted,  Will restart Plavix and Xarelto if okay with orthopedic surgery at this time, will hold aspirin until patient is on Xarelto,  Can go back to aspirin and Plavix once the Xarelto is off as per orthopedic surgery,    Patient now admitted at [facility] acute rehab for further care        Past Medical History:     Past Medical History:   Diagnosis Date    Age-related cognitive decline     Ankle pain     Left ankle fracture in ortho boat.    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HCC) 1990    left with urinary incontinenc post surgical    Caffeine use     2 coffee/day, 1-2 tea per week    Cerebral

## 2024-02-08 NOTE — CARE COORDINATION
ARU CASE MANAGEMENT NOTE:    Patient is alert and oriented x4.    Spoke with patient regarding discharge plan: SNF- [facility]     Received a call from Treasure with [facility], stating being able to accept patient Monday 02/12. Writer will schedule transportation for Monday. Patient notified.     Outside appointments while in ARU: None    Will continue to follow for additional discharge needs.      Electronically signed by Herb Monroe RN on 2/8/2024 at 2:38 PM

## 2024-02-08 NOTE — PROGRESS NOTES
Physical Therapy  Facility/Department: [facility] ACUTE REHAB      NAME: Komal Crawford  : 1940 (83 y.o.)  MRN: [id number]  CODE STATUS: Full Code    Date of Service: 24      Past Medical History:   Diagnosis Date    Age-related cognitive decline     Ankle pain     Left ankle fracture in ortho boat.    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    Caffeine use     2 coffee/day, 1-2 tea per week    Cerebral artery occlusion with cerebral infarction (HCC)     Chronic back pain     Chronic ITP (idiopathic thrombocytopenia) (HCC)     CVA (cerebral infarction) ,     balance issues, short term memory loss    Diabetic gastroparesis (HCC)     Diverticular disease     GERD (gastroesophageal reflux disease)     Hiatal hernia     Hip fracture (HCC)     History of blood transfusion     History of decreased platelet count     Hyperlipemia     Hypertension     Internal hemorrhoid     Macular degeneration of left eye     S/P laser treatment    Nausea and vomiting     Neuropathy     Osteoarthritis     Ringing in ears     Self-catheterizes urinary bladder     6-7 times daily    TIA (transient ischemic attack) 2000    x1    Tubular adenoma     colon polyps    Type II or unspecified type diabetes mellitus without mention of complication, not stated as uncontrolled     Urinary incontinence     frequent UTI s/p infection, surgical dilatation lead to incontinence    Wears dentures     full on top, partial on bottom    Wears glasses      Past Surgical History:   Procedure Laterality Date    APPENDECTOMY      BLADDER SURGERY      bladder stimulator    BLADDER SUSPENSION      multiple    CARDIAC CATHETERIZATION      no stents    CARDIOVASCULAR STRESS TEST  2014    CATARACT REMOVAL WITH IMPLANT Left 2017    [facility]    CATARACT REMOVAL WITH IMPLANT Right 2017    [facility]

## 2024-02-08 NOTE — DISCHARGE INSTR - COC
Continuity of Care Form    Patient Name: Komal Crawford   :  1940  MRN:  [id number]    Admit date:  2024  Discharge date:  2024    Code Status Order: Full Code   Advance Directives:     Admitting Physician:  Angela Sutton MD  PCP: Sabiha Bedolla MD    Discharging Nurse: Hazel Skinner RN  Discharging Hospital Unit/Room#: 2608/2608-01  Discharging Unit Phone Number: [phone]    Emergency Contact:   Extended Emergency Contact Information  Primary Emergency Contact: Ritchie Crawford  Address: [id number] [place] [address]            [place] [address] [facility]  Home Phone: [phone]  Work Phone: [phone]  Mobile Phone: [phone]  Relation: Spouse  Secondary Emergency Contact: An Ventura  Home Phone: [phone]  Mobile Phone: [phone]  Relation: Other    Past Surgical History:  Past Surgical History:   Procedure Laterality Date    APPENDECTOMY      BLADDER SURGERY      bladder stimulator    BLADDER SUSPENSION      multiple    CARDIAC CATHETERIZATION  2008    no stents    CARDIOVASCULAR STRESS TEST  2014    CATARACT REMOVAL WITH IMPLANT Left 2017    [facility]    CATARACT REMOVAL WITH IMPLANT Right 2017    [facility]    COLON SURGERY      colostomy reversal    COLONOSCOPY  2016    tubular adenoma x3; internal hemorrhoids    COLONOSCOPY N/A 2019    COLONOSCOPY DIAGNOSTIC performed by Eli Marinelli MD at [facility] ENDO    COLONOSCOPY  2019    COLOSTOMY  1986    bowel obstruction/ rupture from diverticular disease, reversal 3 mos later    CORONARY ANGIOPLASTY WITH STENT PLACEMENT  2022    CYSTOSCOPY  2017    W/ 200IU Botox     FRACTURE SURGERY Right     hip    FRACTURE SURGERY Left     WRIST    FRACTURE SURGERY Left     ankle    HERNIA REPAIR      HIP SURGERY Right 2023    HIP HARDWARE REMOVAL - WITH DEEP HARDWARE REMOVAL 7.3 NABILA SCREW   X3 performed by Jon Bonner MD at [facility] OR

## 2024-02-09 LAB
GLUCOSE BLD-MCNC: 114 MG/DL (ref 65–105)
GLUCOSE BLD-MCNC: 125 MG/DL (ref 65–105)
GLUCOSE BLD-MCNC: 127 MG/DL (ref 65–105)
GLUCOSE BLD-MCNC: 144 MG/DL (ref 65–105)

## 2024-02-09 PROCEDURE — 6370000000 HC RX 637 (ALT 250 FOR IP): Performed by: PHYSICAL MEDICINE & REHABILITATION

## 2024-02-09 PROCEDURE — 6370000000 HC RX 637 (ALT 250 FOR IP): Performed by: INTERNAL MEDICINE

## 2024-02-09 PROCEDURE — 99232 SBSQ HOSP IP/OBS MODERATE 35: CPT | Performed by: PHYSICAL MEDICINE & REHABILITATION

## 2024-02-09 PROCEDURE — 97110 THERAPEUTIC EXERCISES: CPT

## 2024-02-09 PROCEDURE — 97116 GAIT TRAINING THERAPY: CPT

## 2024-02-09 PROCEDURE — 97530 THERAPEUTIC ACTIVITIES: CPT

## 2024-02-09 PROCEDURE — 82947 ASSAY GLUCOSE BLOOD QUANT: CPT

## 2024-02-09 PROCEDURE — 97535 SELF CARE MNGMENT TRAINING: CPT

## 2024-02-09 PROCEDURE — 97542 WHEELCHAIR MNGMENT TRAINING: CPT

## 2024-02-09 PROCEDURE — 1180000000 HC REHAB R&B

## 2024-02-09 PROCEDURE — 99232 SBSQ HOSP IP/OBS MODERATE 35: CPT | Performed by: INTERNAL MEDICINE

## 2024-02-09 RX ADMIN — ACETAMINOPHEN 650 MG: 325 TABLET, FILM COATED ORAL at 12:21

## 2024-02-09 RX ADMIN — PANTOPRAZOLE SODIUM 40 MG: 40 TABLET, DELAYED RELEASE ORAL at 05:28

## 2024-02-09 RX ADMIN — ROPINIROLE HYDROCHLORIDE 4 MG: 1 TABLET, FILM COATED ORAL at 21:34

## 2024-02-09 RX ADMIN — RIVAROXABAN 10 MG: 10 TABLET, FILM COATED ORAL at 21:34

## 2024-02-09 RX ADMIN — ACETAMINOPHEN 650 MG: 325 TABLET, FILM COATED ORAL at 18:44

## 2024-02-09 RX ADMIN — NITROFURANTOIN MONOHYDRATE/MACROCRYSTALS 100 MG: 75; 25 CAPSULE ORAL at 08:22

## 2024-02-09 RX ADMIN — RANOLAZINE 500 MG: 500 TABLET, EXTENDED RELEASE ORAL at 21:36

## 2024-02-09 RX ADMIN — ATORVASTATIN CALCIUM 10 MG: 10 TABLET, FILM COATED ORAL at 21:34

## 2024-02-09 RX ADMIN — NITROFURANTOIN MONOHYDRATE/MACROCRYSTALS 100 MG: 75; 25 CAPSULE ORAL at 21:36

## 2024-02-09 RX ADMIN — DICLOFENAC SODIUM 4 G: 10 GEL TOPICAL at 08:27

## 2024-02-09 RX ADMIN — ACETAMINOPHEN 650 MG: 325 TABLET, FILM COATED ORAL at 05:28

## 2024-02-09 RX ADMIN — CLOPIDOGREL BISULFATE 75 MG: 75 TABLET ORAL at 08:21

## 2024-02-09 RX ADMIN — GLIPIZIDE 5 MG: 10 TABLET ORAL at 05:28

## 2024-02-09 RX ADMIN — HYDROCODONE BITARTRATE AND ACETAMINOPHEN 1 TABLET: 5; 325 TABLET ORAL at 08:20

## 2024-02-09 RX ADMIN — DICLOFENAC SODIUM 4 G: 10 GEL TOPICAL at 21:40

## 2024-02-09 RX ADMIN — PRIMIDONE 50 MG: 50 TABLET ORAL at 21:36

## 2024-02-09 RX ADMIN — HYDROCODONE BITARTRATE AND ACETAMINOPHEN 1 TABLET: 5; 325 TABLET ORAL at 21:55

## 2024-02-09 RX ADMIN — RANOLAZINE 500 MG: 500 TABLET, EXTENDED RELEASE ORAL at 08:22

## 2024-02-09 RX ADMIN — INSULIN GLARGINE 10 UNITS: 100 INJECTION, SOLUTION SUBCUTANEOUS at 21:35

## 2024-02-09 RX ADMIN — PRIMIDONE 50 MG: 50 TABLET ORAL at 08:22

## 2024-02-09 RX ADMIN — POLYETHYLENE GLYCOL 3350 17 G: 17 POWDER, FOR SOLUTION ORAL at 08:21

## 2024-02-09 ASSESSMENT — PAIN DESCRIPTION - ORIENTATION: ORIENTATION: LEFT

## 2024-02-09 ASSESSMENT — PAIN SCALES - GENERAL
PAINLEVEL_OUTOF10: 4
PAINLEVEL_OUTOF10: 4
PAINLEVEL_OUTOF10: 1
PAINLEVEL_OUTOF10: 6
PAINLEVEL_OUTOF10: 0

## 2024-02-09 ASSESSMENT — PAIN DESCRIPTION - LOCATION
LOCATION: FOOT
LOCATION: LEG

## 2024-02-09 NOTE — PROGRESS NOTES
Physical Medicine & Rehabilitation  Progress Note      Subjective:      83 year-old female with L hip fracture treated by ISHAN and L distal femur fracture treated by TKA revision. Patient is reporting improvement in her L ankle pain today with Voltaren gel. She had staples removed and dressings changed last night from hip and knee incisions. She is c/o bladder spasms starting last night.     ROS:  Denies fevers, chills, sweats.  No chest pain, palpitations, lightheadedness.  Denies coughing, wheezing or shortness of breath.  Denies abdominal pain, nausea, diarrhea or constipation.  No new areas of joint pain.  Denies new areas of numbness or weakness.  Denies new anxiety or depression issues.  She is c/o blisters on proximal L thigh    Rehabilitation:       PT:    Bed mobility  Bridging:  (Pt did not want to try mobility in p.m. d/t increased pain L LE)  Rolling to Left: Maximum assistance  Rolling to Right: Maximum assistance  Supine to Sit: Moderate assistance  Sit to Supine: Moderate assistance  Scooting: Minimal assistance  Bed Mobility Comments: Patient up in recliner prior to  AM session and returned to recliner post.         Transfers  Sit to Stand: Maximum Assistance (on Magda Stedy for in // bars.)  Stand to Sit: Maximum Assistance  Bed to Chair: Dependent/Total (using Magda Stey.  Requires 2 person assist for stand on Magda Mod-Max A x 2.  Poor posture and flexed forward when standing.)  Stand Pivot Transfers: Dependent/Total (Magda Stedy;  Attempted RW Max A x 2 but not safe.  Patient having difficulty shifting and turning.)  Squat Pivot Transfers: Dependent/Total (Patient fearful and required second person.  Max x 2 with verbal and tactile cues.)  Comment: N/A -attempted sit to stand transfers x 2/unable even with the bed height increased.  Transfers  Surface: Wheelchair (<>NuStep)  Additional Factors: With handrails, Increased time to complete  Device:  (Magda Steady.)  Sit to Stand  Assistance Level:

## 2024-02-09 NOTE — PROGRESS NOTES
[facility]   Acute Rehabilitation Occupational Therapy Daily Treatment Note    Date: 24  Patient Name: Komal Crawford       Room: 2608/2608-01  MRN: [id number]  Account: [id number]   : 1940  (83 y.o.) Gender: female          Diagnosis: Admitted with left femoral neck and shane-prosthetic femur fracture post fall,  L THR 24.   left hip fracture s/p total hip arthroplasty 24. , left distal femur fracture s/p total knee arthroplasty revision 24.  24 per Dr. Johnson: L ankle sprain: likely soft tissue injury. CT  negative.Will start Voltaren gel  Additional Pertinent Hx: Expand All Collapse All    Physical Medicine & Rehabilitation History and Physical  [facility] Acute [facility]      Primary care provider:  Sabiha Bedolla MD      Chief Complaint and Reason for Rehabilitation Admission:   ADL and mobility deficits secondary to left hip fracture s/p total hip arthroplasty, left distal femur fracture s/p total knee arthroplasty revision     History of Present Illness:  Komal Crawford is a 83 y.o. right-handed female with history of benign tumor of spinal cord s/p resection with residual urinary retention (self caths at home), CVA, CAD s/p stenting, HTN, HLD, type 2 diabetes, chronic ITP, GERD, anxiety admitted to [facility] on 2024.  She was originally admitted to [facility] on 24.     She initially presented after a mechanical fall when attempting to  her dog and her left leg gave out.  Denied hitting her head.  Initial x-rays did not show any pelvic or hip fractures but did show moderate to severe osteoarthritis of the left hip.  MRI left hip showed suspected posttraumatic marrow edema and probably impaction fracture at the superolateral left femoral head.  She was also found to have comminuted impacted fracture of the left distal femur.  She underwent left total hip arthroplasty on 24 (Dr. Bonner)

## 2024-02-09 NOTE — PLAN OF CARE
Problem: Discharge Planning  Goal: Discharge to home or other facility with appropriate resources  Outcome: Progressing  Flowsheets (Taken 2/9/2024 0759)  Discharge to home or other facility with appropriate resources:   Identify barriers to discharge with patient and caregiver   Identify discharge learning needs (meds, wound care, etc)   Arrange for needed discharge resources and transportation as appropriate     Problem: Safety - Adult  Goal: Free from fall injury  Outcome: Progressing  Flowsheets (Taken 2/9/2024 1041)  Free From Fall Injury: Instruct family/caregiver on patient safety     Problem: ABCDS Injury Assessment  Goal: Absence of physical injury  Outcome: Progressing  Flowsheets (Taken 2/9/2024 1041)  Absence of Physical Injury: Implement safety measures based on patient assessment     Problem: Skin/Tissue Integrity  Goal: Absence of new skin breakdown  Description: 1.  Monitor for areas of redness and/or skin breakdown  2.  Assess vascular access sites hourly  3.  Every 4-6 hours minimum:  Change oxygen saturation probe site  4.  Every 4-6 hours:  If on nasal continuous positive airway pressure, respiratory therapy assess nares and determine need for appliance change or resting period.  Outcome: Progressing     Problem: Chronic Conditions and Co-morbidities  Goal: Patient's chronic conditions and co-morbidity symptoms are monitored and maintained or improved  Outcome: Progressing  Flowsheets (Taken 2/9/2024 0759)  Care Plan - Patient's Chronic Conditions and Co-Morbidity Symptoms are Monitored and Maintained or Improved:   Monitor and assess patient's chronic conditions and comorbid symptoms for stability, deterioration, or improvement   Collaborate with multidisciplinary team to address chronic and comorbid conditions and prevent exacerbation or deterioration   Update acute care plan with appropriate goals if chronic or comorbid symptoms are exacerbated and prevent overall improvement and discharge      Problem: Pain  Goal: Verbalizes/displays adequate comfort level or baseline comfort level  Outcome: Progressing     Problem: Nutrition Deficit:  Goal: Optimize nutritional status  Outcome: Progressing  Flowsheets (Taken 2/9/2024 1123 by Rasheeda Bloom RD, LD)  Nutrient intake appropriate for improving, restoring, or maintaining nutritional needs: Monitor oral intake, labs, and treatment plans

## 2024-02-09 NOTE — PROGRESS NOTES
IV removed per order from Right forearm, catheter intact, no bleeding, or swelling at injection site, patient tolerated well.

## 2024-02-09 NOTE — PROGRESS NOTES
Writer was able to observe patient while in rehab working on gait training. Patient is a very hard worker.  Writer was able to visit during rest break.  Spiritual care will continue to offer emotional and spiritual support.     02/09/24 1115   Encounter Summary   Encounter Overview/Reason  Spiritual/Emotional Needs   Service Provided For: Patient   Referral/Consult From: [facility] [place]   Support System Spouse   Last Encounter  02/09/24   Complexity of Encounter Low   Begin Time 1115   End Time  1130   Total Time Calculated 15 min   Spiritual/Emotional needs   Type Spiritual Support   Assessment/Intervention/Outcome   Assessment Calm;Coping   Intervention Active listening;Explored/Affirmed feelings, thoughts, concerns;Read/Provided Scripture   Outcome Engaged in conversation;Expressed Gratitude

## 2024-02-09 NOTE — PROGRESS NOTES
Comprehensive Nutrition Assessment    Type and Reason for Visit:  Reassess    Nutrition Recommendations/Plan:   Continue current diet.   Continue oral nutrition supplements.      Malnutrition Assessment:  Malnutrition Status:  At risk for malnutrition (Comment) (01/27/24 1232)    Context:  Acute Illness     Findings of the 6 clinical characteristics of malnutrition:  Energy Intake:  No significant decrease in energy intake  Weight Loss:  No significant weight loss     Body Fat Loss:  Unable to assess     Muscle Mass Loss:  Unable to assess    Fluid Accumulation:  Mild Extremities   Strength:  Not Performed    Nutrition Assessment:    Pt was eating [employer] with visitors at lunch today. Overall, has been eating well and would like Glucerna to continue.    Nutrition Related Findings:    Edema: +1 RLE, +3 LLE.  POC Glucose: 114, 125. Other labs and meds reviewed. Wound Type: Pressure Injury, Multiple, Unstageable, Surgical Incision       Current Nutrition Intake & Therapies:    Average Meal Intake: 51-75%, % (varies at time)  Average Supplements Intake: % (Most)  ADULT DIET; Regular; 4 carb choices (60 gm/meal)  ADULT ORAL NUTRITION SUPPLEMENT; Breakfast, Lunch, Dinner; Diabetic Oral Supplement    Anthropometric Measures:  Height: 144 cm (4' 8.69\")  Ideal Body Weight (IBW): 83 lbs (38 kg)    Admission Body Weight: 75.6 kg (166 lb 10.7 oz)  Current Body Weight: 73.6 kg (162 lb 4.1 oz), 200.8 % IBW. Weight Source: Bed Scale  Current BMI (kg/m2): 35.5  Usual Body Weight: 66.7 kg (147 lb 0.8 oz) (wt appears to vary)  % Weight Change (Calculated): 10.3                    BMI Categories: Obese Class 2 (BMI 35.0 -39.9)    Estimated Daily Nutrient Needs:  Energy Requirements Based On: Kcal/kg  Weight Used for Energy Requirements: Admission  Energy (kcal/day): 1370  Weight Used for Protein Requirements: Ideal  Protein (g/day): 76 based on 2g/kg IBW  Method Used for Fluid Requirements: 1 ml/kcal  Fluid

## 2024-02-09 NOTE — PLAN OF CARE
Problem: Discharge Planning  Goal: Discharge to home or other facility with appropriate resources  2/9/2024 0335 by Sakshi Carmichael, RN  Outcome: Progressing  Flowsheets (Taken 2/5/2024 2010 by Sophia Shafer, RN)  Discharge to home or other facility with appropriate resources:   Identify barriers to discharge with patient and caregiver   Arrange for needed discharge resources and transportation as appropriate   Arrange for interpreters to assist at discharge as needed   Identify discharge learning needs (meds, wound care, etc)   Refer to discharge planning if patient needs post-hospital services based on physician order or complex needs related to functional status, cognitive ability or social support system     Problem: Safety - Adult  Goal: Free from fall injury  2/9/2024 0335 by Sakshi Carmichael RN  Outcome: Progressing  Flowsheets (Taken 2/8/2024 1013 by Charu Herrera, RN)  Free From Fall Injury: Instruct family/caregiver on patient safety     Problem: ABCDS Injury Assessment  Goal: Absence of physical injury  2/9/2024 0335 by Sakshi Carmichael RN  Outcome: Progressing  Flowsheets (Taken 2/8/2024 1013 by Charu Herrera, RN)  Absence of Physical Injury: Implement safety measures based on patient assessment     Problem: Skin/Tissue Integrity  Goal: Absence of new skin breakdown  Description: 1.  Monitor for areas of redness and/or skin breakdown  2.  Assess vascular access sites hourly  3.  Every 4-6 hours minimum:  Change oxygen saturation probe site  4.  Every 4-6 hours:  If on nasal continuous positive airway pressure, respiratory therapy assess nares and determine need for appliance change or resting period.  Outcome: Progressing     Problem: Chronic Conditions and Co-morbidities  Goal: Patient's chronic conditions and co-morbidity symptoms are monitored and maintained or improved  Outcome: Progressing

## 2024-02-09 NOTE — PROGRESS NOTES
[facility]   IN-PATIENT SERVICE   [facility]  Consult             Date:   2/9/2024  Patient name:  Komal Crawford  Date of admission:  1/26/2024  5:17 PM  MRN:   [id number]  Account:  [id number]  YOB: 1940  PCP:    Sabiha Bedolla MD  Room:   [address] [place] [address]  Code Status:    Full Code    Chief Complaint:     No chief complaint on file.      History Obtained From:     patient    History of Present Illness:     83-year-old female recently discharged from ARU, presenting with fall left hip pain chronic sciatica, x-ray shows no fracture severe osteoarthritis left hip  History of T2DM HTN CAD status post stent 2022, chronic thrombocytopenia chronic anemia, recurrent UTI neurogenic bladder straight cath at home  Orthopedics consulted due for left hip MRI and lumbar MRI done, impacted fracture left femoral neck, status post repair on January 20, 2024, distal comminuted fracture left femur, status post surgery on January 23, aspirin Plavix was on hold,  Aspirin Plavix and Xarelto was on hold due to hemoglobin drop below 7, given unit of blood, hemoglobin came back to 8.4,,  Rechecked this morning on January 26 is 8.4 as well,  Platelet count went up from 1 33-1 49 today,  So far looking very good,  Patient will be discharged to acute rehab, accepted,  Will restart Plavix and Xarelto if okay with orthopedic surgery at this time, will hold aspirin until patient is on Xarelto,  Can go back to aspirin and Plavix once the Xarelto is off as per orthopedic surgery,    Patient now admitted at [facility] acute rehab for further care        Past Medical History:     Past Medical History:   Diagnosis Date    Age-related cognitive decline     Ankle pain     Left ankle fracture in ortho boat.    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HCC) 1990    left with urinary incontinenc post surgical    Caffeine use     2 coffee/day, 1-2 tea per week    Cerebral

## 2024-02-09 NOTE — PROGRESS NOTES
Physical Therapy  Facility/Department: [facility] ACUTE REHAB  Rehabilitation Physical Therapy    NAME: Komal Crawford  : 1940 (83 y.o.)  MRN: [id number]  CODE STATUS: Full Code    Date of Service: 24    Chart Reviewed: Yes  Additional Pertinent Hx: Komal Crawford is a 83 y.o. female with history of coronary artery disease, diabetes, TIA, hypertension who presents with left hip pain after a mechanical fall that occurred today.  Patient states she was at home and had bent over to  her dog when her left leg gave out and she felt a snapping sensation in her left femur/hip area.  Patient was unable to stand after the incident and called EMS. Patient recently underwent a (L) TKA revision and a (L) ISHAN and has been admitted to acute rehab to further addresss the physical impairments and activiy limitations that are present.    Family / Caregiver Present: No    Referring Practitioner: Michael Harrell MD/Angela Sutton MD  Referral Date : 24  Diagnosis: Closed (L_ hip fracture with malunion. Post - op (L) ISHAN and (L) TKA    General Comment  Comments: Pt is anxious about therapy however pt lynn through and wants to keep trying.    Restrictions:  Restrictions/Precautions: Fall Risk;Weight Bearing;Contact Precautions;Bed Alarm;Up as Tolerated  Lower Extremity Weight Bearing Restrictions  Right Lower Extremity Weight Bearing: Weight Bearing As Tolerated  Left Lower Extremity Weight Bearing: Weight Bearing As Tolerated  Position Activity Restriction  Hip Precautions: Posterior hip precautions  Other position/activity restrictions: Pt had a (L) ISHAN  by Dr. JORDYN Bonner on 2024. Pt had surgery on 2024 due to a Periprosthetic fracture of femur at tip of prosthesis for a (L) TKA revision by Dr. AMEE Keyes.     SUBJECTIVE  Subjective: Pt reports pain in ankle \"radiates up past my knee\". Pt is sitting in w/c with ice bag on top of the left knee.  Pain: 7/10 prior to therapy, 4/10 after walking in

## 2024-02-10 LAB
ANION GAP SERPL CALCULATED.3IONS-SCNC: 10 MMOL/L (ref 9–17)
BASOPHILS # BLD: 0 K/UL (ref 0–0.2)
BASOPHILS NFR BLD: 1 % (ref 0–2)
BUN SERPL-MCNC: 14 MG/DL (ref 8–23)
CALCIUM SERPL-MCNC: 8.6 MG/DL (ref 8.6–10.4)
CHLORIDE SERPL-SCNC: 100 MMOL/L (ref 98–107)
CO2 SERPL-SCNC: 21 MMOL/L (ref 20–31)
CREAT SERPL-MCNC: 0.6 MG/DL (ref 0.5–0.9)
EOSINOPHIL # BLD: 0.3 K/UL (ref 0–0.4)
EOSINOPHILS RELATIVE PERCENT: 7 % (ref 0–4)
ERYTHROCYTE [DISTWIDTH] IN BLOOD BY AUTOMATED COUNT: 18.9 % (ref 11.5–14.9)
GFR SERPL CREATININE-BSD FRML MDRD: >60 ML/MIN/1.73M2
GLUCOSE BLD-MCNC: 109 MG/DL (ref 65–105)
GLUCOSE BLD-MCNC: 135 MG/DL (ref 65–105)
GLUCOSE BLD-MCNC: 141 MG/DL (ref 65–105)
GLUCOSE BLD-MCNC: 157 MG/DL (ref 65–105)
GLUCOSE SERPL-MCNC: 119 MG/DL (ref 70–99)
HCT VFR BLD AUTO: 29.5 % (ref 36–46)
HGB BLD-MCNC: 9.4 G/DL (ref 12–16)
LYMPHOCYTES NFR BLD: 0.8 K/UL (ref 1–4.8)
LYMPHOCYTES RELATIVE PERCENT: 19 % (ref 24–44)
MCH RBC QN AUTO: 29.3 PG (ref 26–34)
MCHC RBC AUTO-ENTMCNC: 31.8 G/DL (ref 31–37)
MCV RBC AUTO: 92 FL (ref 80–100)
MONOCYTES NFR BLD: 0.5 K/UL (ref 0.1–1.3)
MONOCYTES NFR BLD: 12 % (ref 1–7)
NEUTROPHILS NFR BLD: 61 % (ref 36–66)
NEUTS SEG NFR BLD: 2.6 K/UL (ref 1.3–9.1)
PLATELET # BLD AUTO: 204 K/UL (ref 150–450)
PMV BLD AUTO: 10.7 FL (ref 6–12)
POTASSIUM SERPL-SCNC: 4.1 MMOL/L (ref 3.7–5.3)
RBC # BLD AUTO: 3.21 M/UL (ref 4–5.2)
SODIUM SERPL-SCNC: 131 MMOL/L (ref 135–144)
WBC OTHER # BLD: 4.4 K/UL (ref 3.5–11)

## 2024-02-10 PROCEDURE — 6370000000 HC RX 637 (ALT 250 FOR IP): Performed by: PHYSICAL MEDICINE & REHABILITATION

## 2024-02-10 PROCEDURE — 97535 SELF CARE MNGMENT TRAINING: CPT

## 2024-02-10 PROCEDURE — 99232 SBSQ HOSP IP/OBS MODERATE 35: CPT | Performed by: INTERNAL MEDICINE

## 2024-02-10 PROCEDURE — 97116 GAIT TRAINING THERAPY: CPT

## 2024-02-10 PROCEDURE — 82947 ASSAY GLUCOSE BLOOD QUANT: CPT

## 2024-02-10 PROCEDURE — 1180000000 HC REHAB R&B

## 2024-02-10 PROCEDURE — 97110 THERAPEUTIC EXERCISES: CPT

## 2024-02-10 PROCEDURE — 36415 COLL VENOUS BLD VENIPUNCTURE: CPT

## 2024-02-10 PROCEDURE — 6370000000 HC RX 637 (ALT 250 FOR IP): Performed by: INTERNAL MEDICINE

## 2024-02-10 PROCEDURE — 99232 SBSQ HOSP IP/OBS MODERATE 35: CPT | Performed by: PHYSICAL MEDICINE & REHABILITATION

## 2024-02-10 PROCEDURE — 2580000003 HC RX 258: Performed by: NURSE PRACTITIONER

## 2024-02-10 PROCEDURE — 97530 THERAPEUTIC ACTIVITIES: CPT

## 2024-02-10 PROCEDURE — 85025 COMPLETE CBC W/AUTO DIFF WBC: CPT

## 2024-02-10 PROCEDURE — 80048 BASIC METABOLIC PNL TOTAL CA: CPT

## 2024-02-10 RX ORDER — SODIUM CHLORIDE 9 MG/ML
INJECTION, SOLUTION INTRAVENOUS CONTINUOUS
Status: DISCONTINUED | OUTPATIENT
Start: 2024-02-10 | End: 2024-02-11

## 2024-02-10 RX ORDER — DICYCLOMINE HYDROCHLORIDE 10 MG/1
10 CAPSULE ORAL 3 TIMES DAILY PRN
Status: DISCONTINUED | OUTPATIENT
Start: 2024-02-10 | End: 2024-02-12 | Stop reason: HOSPADM

## 2024-02-10 RX ADMIN — DICLOFENAC SODIUM 4 G: 10 GEL TOPICAL at 09:02

## 2024-02-10 RX ADMIN — INSULIN GLARGINE 10 UNITS: 100 INJECTION, SOLUTION SUBCUTANEOUS at 20:40

## 2024-02-10 RX ADMIN — ACETAMINOPHEN 650 MG: 325 TABLET, FILM COATED ORAL at 17:20

## 2024-02-10 RX ADMIN — NITROFURANTOIN MONOHYDRATE/MACROCRYSTALS 100 MG: 75; 25 CAPSULE ORAL at 09:02

## 2024-02-10 RX ADMIN — HYDROCODONE BITARTRATE AND ACETAMINOPHEN 1 TABLET: 5; 325 TABLET ORAL at 11:01

## 2024-02-10 RX ADMIN — ROPINIROLE HYDROCHLORIDE 4 MG: 1 TABLET, FILM COATED ORAL at 20:40

## 2024-02-10 RX ADMIN — POLYETHYLENE GLYCOL 3350 17 G: 17 POWDER, FOR SOLUTION ORAL at 09:02

## 2024-02-10 RX ADMIN — PANTOPRAZOLE SODIUM 40 MG: 40 TABLET, DELAYED RELEASE ORAL at 05:52

## 2024-02-10 RX ADMIN — ACETAMINOPHEN 650 MG: 325 TABLET, FILM COATED ORAL at 05:52

## 2024-02-10 RX ADMIN — PRIMIDONE 50 MG: 50 TABLET ORAL at 20:45

## 2024-02-10 RX ADMIN — PRIMIDONE 50 MG: 50 TABLET ORAL at 09:02

## 2024-02-10 RX ADMIN — HYDROCODONE BITARTRATE AND ACETAMINOPHEN 1 TABLET: 5; 325 TABLET ORAL at 22:19

## 2024-02-10 RX ADMIN — GLIPIZIDE 5 MG: 10 TABLET ORAL at 05:49

## 2024-02-10 RX ADMIN — RIVAROXABAN 10 MG: 10 TABLET, FILM COATED ORAL at 20:40

## 2024-02-10 RX ADMIN — RANOLAZINE 500 MG: 500 TABLET, EXTENDED RELEASE ORAL at 09:02

## 2024-02-10 RX ADMIN — NITROFURANTOIN MONOHYDRATE/MACROCRYSTALS 100 MG: 75; 25 CAPSULE ORAL at 20:43

## 2024-02-10 RX ADMIN — DICLOFENAC SODIUM 4 G: 10 GEL TOPICAL at 20:45

## 2024-02-10 RX ADMIN — ACETAMINOPHEN 650 MG: 325 TABLET, FILM COATED ORAL at 13:19

## 2024-02-10 RX ADMIN — SODIUM CHLORIDE: 9 INJECTION, SOLUTION INTRAVENOUS at 22:30

## 2024-02-10 RX ADMIN — RANOLAZINE 500 MG: 500 TABLET, EXTENDED RELEASE ORAL at 20:44

## 2024-02-10 RX ADMIN — ATORVASTATIN CALCIUM 10 MG: 10 TABLET, FILM COATED ORAL at 20:40

## 2024-02-10 RX ADMIN — CLOPIDOGREL BISULFATE 75 MG: 75 TABLET ORAL at 09:02

## 2024-02-10 ASSESSMENT — PAIN SCALES - WONG BAKER
WONGBAKER_NUMERICALRESPONSE: HURTS WHOLE LOT
WONGBAKER_NUMERICALRESPONSE: NO HURT
WONGBAKER_NUMERICALRESPONSE: HURTS WHOLE LOT

## 2024-02-10 ASSESSMENT — PAIN - FUNCTIONAL ASSESSMENT: PAIN_FUNCTIONAL_ASSESSMENT: ACTIVITIES ARE NOT PREVENTED

## 2024-02-10 ASSESSMENT — PAIN SCALES - GENERAL
PAINLEVEL_OUTOF10: 5
PAINLEVEL_OUTOF10: 0
PAINLEVEL_OUTOF10: 0
PAINLEVEL_OUTOF10: 7
PAINLEVEL_OUTOF10: 8
PAINLEVEL_OUTOF10: 5
PAINLEVEL_OUTOF10: 0

## 2024-02-10 ASSESSMENT — PAIN DESCRIPTION - LOCATION: LOCATION: FOOT

## 2024-02-10 ASSESSMENT — PAIN DESCRIPTION - DESCRIPTORS: DESCRIPTORS: BURNING

## 2024-02-10 ASSESSMENT — PAIN DESCRIPTION - ORIENTATION: ORIENTATION: LEFT

## 2024-02-10 NOTE — PROGRESS NOTES
Physical Therapy  Facility/Department: [facility] ACUTE REHAB  Rehabilitation Physical Therapy Treatment Note    NAME: Komal Crawford  : 1940 (83 y.o.)  MRN: [id number]  CODE STATUS: Full Code    Date of Service: 2/10/24       Restrictions:  Restrictions/Precautions: Fall Risk, Weight Bearing, Contact Precautions, Bed Alarm, Up as Tolerated  Lower Extremity Weight Bearing Restrictions  Right Lower Extremity Weight Bearing: Weight Bearing As Tolerated  Left Lower Extremity Weight Bearing: Weight Bearing As Tolerated  Position Activity Restriction  Hip Precautions: Posterior hip precautions  Other position/activity restrictions: Pt had a (L) ISHAN  by Dr. JORDYN Bonner on 2024. Pt had surgery on 2024 due to a Periprosthetic fracture of femur at tip of prosthesis for a (L) TKA revision by Dr. AMEE Keyes.     SUBJECTIVE  Subjective  Subjective: Pt seated in WC, ready for tx and pleasant towards writer.  Pain: 6/10 L LE pain      OBJECTIVE  Cognition  Overall Cognitive Status: WFL  Orientation  Overall Orientation Status: Within Functional Limits    Functional Mobility  Transfers  Surface: Wheelchair (<> Magda Stead)  Additional Factors: With handrails;Increased time to complete  Device: Walker (rolling)  Sit to Stand  Assistance Level: Moderate assistance;Requires x 2 assistance  Stand to Sit  Assistance Level: Minimal assistance  Bed To/From Chair  Technique: Stand pivot  Assistance Level: Moderate assistance;Requires x 2 assistance  Stand Pivot  Assistance Level: Minimal assistance;Stand by assist;Requires x 2 assistance  Skilled Clinical Factors: 2A for safety      Environmental Mobility  Ambulation  Surface: Level surface  Device:  (uppright walker)  Distance: 28ft  Activity: Within Unit  Additional Factors: Increased time to complete  Assistance Level: Requires x 2 assistance;Contact guard assist (+WC follow)  Gait Deviations: Wide base of support;Unsteady gait;Decreased weight shift left;Slow jia;Decreased heel

## 2024-02-10 NOTE — PROGRESS NOTES
[facility]   IN-PATIENT SERVICE   [facility]  Consult             Date:   2/10/2024  Patient name:  Komal Crawford  Date of admission:  1/26/2024  5:17 PM  MRN:   [id number]  Account:  [id number]  YOB: 1940  PCP:    Sabiha Bedolla MD  Room:   [address] [place] [address]  Code Status:    Full Code    Chief Complaint:     No chief complaint on file.      History Obtained From:     patient    History of Present Illness:     83-year-old female recently discharged from ARU, presenting with fall left hip pain chronic sciatica, x-ray shows no fracture severe osteoarthritis left hip  History of T2DM HTN CAD status post stent 2022, chronic thrombocytopenia chronic anemia, recurrent UTI neurogenic bladder straight cath at home  Orthopedics consulted due for left hip MRI and lumbar MRI done, impacted fracture left femoral neck, status post repair on January 20, 2024, distal comminuted fracture left femur, status post surgery on January 23, aspirin Plavix was on hold,  Aspirin Plavix and Xarelto was on hold due to hemoglobin drop below 7, given unit of blood, hemoglobin came back to 8.4,,  Rechecked this morning on January 26 is 8.4 as well,  Platelet count went up from 1 33-1 49 today,  So far looking very good,  Patient will be discharged to acute rehab, accepted,  Will restart Plavix and Xarelto if okay with orthopedic surgery at this time, will hold aspirin until patient is on Xarelto,  Can go back to aspirin and Plavix once the Xarelto is off as per orthopedic surgery,    Patient now admitted at [facility] acute rehab for further care        Past Medical History:     Past Medical History:   Diagnosis Date    Age-related cognitive decline     Ankle pain     Left ankle fracture in ortho boat.    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HCC) 1990    left with urinary incontinenc post surgical    Caffeine use     2 coffee/day, 1-2 tea per week    Cerebral  importantly because of direct risk to patient if care not provided in a hospital setting.    Lavern Rodgers MD  2/10/2024  5:48 PM    Copy sent to Sabiha Christina MD    Please note that this chart was generated using voice recognition Dragon dictation software.  Although every effort was made to ensure the accuracy of this automated transcription, some errors in transcription may have occurred.

## 2024-02-10 NOTE — PROGRESS NOTES
[facility]   Acute Rehabilitation Occupational Therapy Daily Treatment Note    Date: 2/10/24  Patient Name: Komal Crawford       Room: 2608/2608-01  MRN: [id number]  Account: [id number]   : 1940  (83 y.o.) Gender: female          Diagnosis: Admitted with left femoral neck and shane-prosthetic femur fracture post fall,  L THR 24.   left hip fracture s/p total hip arthroplasty 24. , left distal femur fracture s/p total knee arthroplasty revision 24.  24 per Dr. Johnson: L ankle sprain: likely soft tissue injury. CT  negative.Will start Voltaren gel  Additional Pertinent Hx: Expand All Collapse All    Physical Medicine & Rehabilitation History and Physical  [facility] Acute Rehabilitation Unit      Primary care provider:  Sabiha Bedolla MD      Chief Complaint and Reason for Rehabilitation Admission:   ADL and mobility deficits secondary to left hip fracture s/p total hip arthroplasty, left distal femur fracture s/p total knee arthroplasty revision     History of Present Illness:  Komal Crawford is a 83 y.o. 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  She was originally admitted to [facility] on 24.     She initially presented after a mechanical fall when attempting to  her dog and her left leg gave out.  Denied hitting her head.  Initial x-rays did not show any pelvic or hip fractures but did show moderate to severe osteoarthritis of the left hip.  MRI left hip showed suspected posttraumatic marrow edema and probably impaction fracture at the superolateral left femoral head.  She was also found to have comminuted impacted fracture of the left distal femur.  She underwent left total hip arthroplasty on 24 (Dr. Bonner)

## 2024-02-10 NOTE — PROGRESS NOTES
Physical Medicine & Rehabilitation  Progress Note      Subjective:      83 year-old female with L hip fracture treated by ISHAN and L distal femur fracture treated by TKA revision. Patient is noting continued issues with bladder spasms. She feels her L ankle pain is responding to Voltaren gel. Her shoes are providing good support when she ambulates in therapy.     ROS:  Denies fevers, chills, sweats.  No chest pain, palpitations, lightheadedness.  Denies coughing, wheezing or shortness of breath.  Denies abdominal pain, nausea, diarrhea or constipation.  No new areas of joint pain.  Denies new areas of numbness or weakness.  Denies new anxiety or depression issues.  She is c/o blisters on proximal L thigh    Rehabilitation:       PT:    Bed mobility  Bridging:  (Pt did not want to try mobility in p.m. d/t increased pain L LE)  Rolling to Left: Maximum assistance  Rolling to Right: Maximum assistance  Supine to Sit: Moderate assistance  Sit to Supine: Moderate assistance  Scooting: Minimal assistance  Bed Mobility Comments: Patient up in recliner prior to  AM session and returned to recliner post.         Transfers  Sit to Stand: Maximum Assistance (Max Ax1 with Magda Stedy, Max Ax2 with Upright Walker)  Stand to Sit: Maximum Assistance (Max Ax1)  Bed to Chair: Dependent/Total (using Magda Stey.  Requires 2 person assist for stand on Magda Mod-Max A x 2.  Poor posture and flexed forward when standing.)  Stand Pivot Transfers: Maximum Assistance, 2 Person Assistance (Pivot with Upright walker)  Squat Pivot Transfers: Maximum Assistance, 2 Person Assistance (2 person assist due to pt's fears)  Comment: N/A -attempted sit to stand transfers x 2/unable even with the bed height increased.  Transfers  Surface: Wheelchair (<>NuStep)  Additional Factors: With handrails, Increased time to complete  Device:  (Magda Steady.)  Sit to Stand  Assistance Level: Requires x 2 assistance, Maximum assistance  Skilled Clinical Factors: MOD A  SubCUTAneous, Nightly  atorvastatin (LIPITOR) tablet 10 mg, 10 mg, Oral, Nightly  pantoprazole (PROTONIX) tablet 40 mg, 40 mg, Oral, QAM AC  [Held by provider] isosorbide mononitrate (IMDUR) extended release tablet 30 mg, 30 mg, Oral, QPM  [Held by provider] metoprolol succinate (TOPROL XL) extended release tablet 12.5 mg, 12.5 mg, Oral, QPM  primidone (MYSOLINE) tablet 50 mg, 50 mg, Oral, BID  ranolazine (RANEXA) extended release tablet 500 mg, 500 mg, Oral, BID  rOPINIRole (REQUIP) tablet 4 mg, 4 mg, Oral, Nightly  tiZANidine (ZANAFLEX) tablet 2 mg, 2 mg, Oral, Nightly PRN  glipiZIDE (GLUCOTROL) tablet 5 mg, 5 mg, Oral, QAM AC  clopidogrel (PLAVIX) tablet 75 mg, 75 mg, Oral, Daily  HYDROcodone-acetaminophen (NORCO) 5-325 MG per tablet 1 tablet, 1 tablet, Oral, Q4H PRN **OR** HYDROcodone-acetaminophen (NORCO) 5-325 MG per tablet 2 tablet, 2 tablet, Oral, Q4H PRN  acetaminophen (TYLENOL) tablet 650 mg, 650 mg, Oral, Q6H  polyethylene glycol (GLYCOLAX) packet 17 g, 17 g, Oral, Daily  rivaroxaban (XARELTO) tablet 10 mg, 10 mg, Oral, Daily  bisacodyl (DULCOLAX) suppository 10 mg, 10 mg, Rectal, Daily PRN  Facility-Administered Medications Ordered in Other Encounters: 0.9 % sodium chloride infusion 250 mL, 250 mL, IntraVENous, Once      Impression/Plan:   Impaired ADLs, gait, and mobility due to:      L hip fracture: s/p ISHAN 1/20/24 by Dr. Bonner. WBAT LLE with posterior hip precautions PT/OT  for gait, mobility, strengthening, endurance, ADLs, and self care. Has prn Tylenol, Tizanidine, Norco, Tylenol for pain. Staples removed 2/8/24.  L distal femur fracture: s/p L TKA revision by Dr. Keeys 1/23/24. WBAT LLE.   L ankle sprain: likely soft tissue injury. CT 2/7 negative. Has Voltaren gel with improvement. Therapy evaluating for possible brace   Acute blood loss anemia: Hb improved and stable.   Neurogenic bladder/urine retention: history benign spinal cord tumor. Performs self cath at home. Has Winters currently due to

## 2024-02-10 NOTE — PLAN OF CARE
Problem: Discharge Planning  Goal: Discharge to home or other facility with appropriate resources  Outcome: Progressing     Problem: Safety - Adult  Goal: Free from fall injury  Outcome: Progressing     Problem: ABCDS Injury Assessment  Goal: Absence of physical injury  Outcome: Progressing     Problem: Skin/Tissue Integrity  Goal: Absence of new skin breakdown  Description: 1.  Monitor for areas of redness and/or skin breakdown  2.  Assess vascular access sites hourly  3.  Every 4-6 hours minimum:  Change oxygen saturation probe site  4.  Every 4-6 hours:  If on nasal continuous positive airway pressure, respiratory therapy assess nares and determine need for appliance change or resting period.  Outcome: Progressing     Problem: Chronic Conditions and Co-morbidities  Goal: Patient's chronic conditions and co-morbidity symptoms are monitored and maintained or improved  Outcome: Progressing     Problem: Chronic Conditions and Co-morbidities  Goal: Patient's chronic conditions and co-morbidity symptoms are monitored and maintained or improved  Outcome: Progressing     Problem: Pain  Goal: Verbalizes/displays adequate comfort level or baseline comfort level  Outcome: Progressing     Problem: Nutrition Deficit:  Goal: Optimize nutritional status  Outcome: Progressing

## 2024-02-11 LAB
GLUCOSE BLD-MCNC: 104 MG/DL (ref 65–105)
GLUCOSE BLD-MCNC: 119 MG/DL (ref 65–105)
GLUCOSE BLD-MCNC: 136 MG/DL (ref 65–105)
GLUCOSE BLD-MCNC: 193 MG/DL (ref 65–105)

## 2024-02-11 PROCEDURE — 97116 GAIT TRAINING THERAPY: CPT

## 2024-02-11 PROCEDURE — 6370000000 HC RX 637 (ALT 250 FOR IP): Performed by: PHYSICAL MEDICINE & REHABILITATION

## 2024-02-11 PROCEDURE — 97535 SELF CARE MNGMENT TRAINING: CPT

## 2024-02-11 PROCEDURE — 97530 THERAPEUTIC ACTIVITIES: CPT

## 2024-02-11 PROCEDURE — 97110 THERAPEUTIC EXERCISES: CPT

## 2024-02-11 PROCEDURE — 2580000003 HC RX 258: Performed by: NURSE PRACTITIONER

## 2024-02-11 PROCEDURE — 1180000000 HC REHAB R&B

## 2024-02-11 PROCEDURE — 99232 SBSQ HOSP IP/OBS MODERATE 35: CPT | Performed by: INTERNAL MEDICINE

## 2024-02-11 PROCEDURE — 51702 INSERT TEMP BLADDER CATH: CPT

## 2024-02-11 PROCEDURE — 82947 ASSAY GLUCOSE BLOOD QUANT: CPT

## 2024-02-11 PROCEDURE — 99232 SBSQ HOSP IP/OBS MODERATE 35: CPT | Performed by: PHYSICAL MEDICINE & REHABILITATION

## 2024-02-11 PROCEDURE — 6370000000 HC RX 637 (ALT 250 FOR IP): Performed by: INTERNAL MEDICINE

## 2024-02-11 RX ORDER — INSULIN LISPRO 100 [IU]/ML
0-4 INJECTION, SOLUTION INTRAVENOUS; SUBCUTANEOUS NIGHTLY
DISCHARGE
Start: 2024-02-11

## 2024-02-11 RX ORDER — NITROFURANTOIN 25; 75 MG/1; MG/1
100 CAPSULE ORAL EVERY 12 HOURS SCHEDULED
Qty: 20 CAPSULE | Refills: 0 | DISCHARGE
Start: 2024-02-11

## 2024-02-11 RX ORDER — DICYCLOMINE HYDROCHLORIDE 10 MG/1
10 CAPSULE ORAL 3 TIMES DAILY PRN
Qty: 120 CAPSULE | Refills: 3 | DISCHARGE
Start: 2024-02-11

## 2024-02-11 RX ORDER — SENNOSIDES A AND B 8.6 MG/1
2 TABLET, FILM COATED ORAL NIGHTLY PRN
DISCHARGE
Start: 2024-02-11 | End: 2024-03-12

## 2024-02-11 RX ORDER — MIDODRINE HYDROCHLORIDE 2.5 MG/1
2.5 TABLET ORAL 3 TIMES DAILY PRN
Qty: 90 TABLET | Refills: 3 | DISCHARGE
Start: 2024-02-11

## 2024-02-11 RX ORDER — HYDROCODONE BITARTRATE AND ACETAMINOPHEN 5; 325 MG/1; MG/1
1 TABLET ORAL EVERY 6 HOURS PRN
Qty: 12 TABLET | Refills: 0 | Status: SHIPPED | OUTPATIENT
Start: 2024-02-11 | End: 2024-02-14

## 2024-02-11 RX ORDER — POLYETHYLENE GLYCOL 3350 17 G/17G
17 POWDER, FOR SOLUTION ORAL DAILY PRN
Status: DISCONTINUED | OUTPATIENT
Start: 2024-02-11 | End: 2024-02-12 | Stop reason: HOSPADM

## 2024-02-11 RX ORDER — INSULIN LISPRO 100 [IU]/ML
0-8 INJECTION, SOLUTION INTRAVENOUS; SUBCUTANEOUS
DISCHARGE
Start: 2024-02-11

## 2024-02-11 RX ADMIN — DICLOFENAC SODIUM 4 G: 10 GEL TOPICAL at 09:41

## 2024-02-11 RX ADMIN — PRIMIDONE 50 MG: 50 TABLET ORAL at 09:44

## 2024-02-11 RX ADMIN — RIVAROXABAN 10 MG: 10 TABLET, FILM COATED ORAL at 21:41

## 2024-02-11 RX ADMIN — ACETAMINOPHEN 650 MG: 325 TABLET, FILM COATED ORAL at 06:11

## 2024-02-11 RX ADMIN — RANOLAZINE 500 MG: 500 TABLET, EXTENDED RELEASE ORAL at 09:44

## 2024-02-11 RX ADMIN — GLIPIZIDE 5 MG: 10 TABLET ORAL at 06:11

## 2024-02-11 RX ADMIN — DICLOFENAC SODIUM 4 G: 10 GEL TOPICAL at 21:43

## 2024-02-11 RX ADMIN — TIZANIDINE 2 MG: 2 TABLET ORAL at 21:41

## 2024-02-11 RX ADMIN — INSULIN GLARGINE 10 UNITS: 100 INJECTION, SOLUTION SUBCUTANEOUS at 21:43

## 2024-02-11 RX ADMIN — PANTOPRAZOLE SODIUM 40 MG: 40 TABLET, DELAYED RELEASE ORAL at 06:11

## 2024-02-11 RX ADMIN — PRIMIDONE 50 MG: 50 TABLET ORAL at 21:46

## 2024-02-11 RX ADMIN — RANOLAZINE 500 MG: 500 TABLET, EXTENDED RELEASE ORAL at 21:45

## 2024-02-11 RX ADMIN — ATORVASTATIN CALCIUM 10 MG: 10 TABLET, FILM COATED ORAL at 21:43

## 2024-02-11 RX ADMIN — HYDROCODONE BITARTRATE AND ACETAMINOPHEN 1 TABLET: 5; 325 TABLET ORAL at 21:41

## 2024-02-11 RX ADMIN — CLOPIDOGREL BISULFATE 75 MG: 75 TABLET ORAL at 10:25

## 2024-02-11 RX ADMIN — NITROFURANTOIN MONOHYDRATE/MACROCRYSTALS 100 MG: 75; 25 CAPSULE ORAL at 09:45

## 2024-02-11 RX ADMIN — ROPINIROLE HYDROCHLORIDE 4 MG: 1 TABLET, FILM COATED ORAL at 21:41

## 2024-02-11 RX ADMIN — ACETAMINOPHEN 650 MG: 325 TABLET, FILM COATED ORAL at 16:52

## 2024-02-11 RX ADMIN — NITROFURANTOIN MONOHYDRATE/MACROCRYSTALS 100 MG: 75; 25 CAPSULE ORAL at 21:44

## 2024-02-11 RX ADMIN — POLYETHYLENE GLYCOL 3350 17 G: 17 POWDER, FOR SOLUTION ORAL at 09:44

## 2024-02-11 RX ADMIN — ACETAMINOPHEN 650 MG: 325 TABLET, FILM COATED ORAL at 13:15

## 2024-02-11 RX ADMIN — HYDROCODONE BITARTRATE AND ACETAMINOPHEN 1 TABLET: 5; 325 TABLET ORAL at 06:23

## 2024-02-11 RX ADMIN — SODIUM CHLORIDE: 9 INJECTION, SOLUTION INTRAVENOUS at 12:35

## 2024-02-11 ASSESSMENT — PAIN DESCRIPTION - LOCATION: LOCATION: LEG

## 2024-02-11 ASSESSMENT — PAIN SCALES - GENERAL
PAINLEVEL_OUTOF10: 6
PAINLEVEL_OUTOF10: 6
PAINLEVEL_OUTOF10: 4
PAINLEVEL_OUTOF10: 2
PAINLEVEL_OUTOF10: 2
PAINLEVEL_OUTOF10: 0

## 2024-02-11 ASSESSMENT — PAIN DESCRIPTION - ORIENTATION: ORIENTATION: LEFT

## 2024-02-11 ASSESSMENT — PAIN DESCRIPTION - DESCRIPTORS: DESCRIPTORS: BURNING

## 2024-02-11 ASSESSMENT — PAIN - FUNCTIONAL ASSESSMENT: PAIN_FUNCTIONAL_ASSESSMENT: ACTIVITIES ARE NOT PREVENTED

## 2024-02-11 NOTE — PROGRESS NOTES
Physical Medicine & Rehabilitation  Progress Note      Subjective:      83 year-old female with L hip fracture treated by ISHAN and L distal femur fracture treated by TKA revision. Patient is noting continued improvement in her mobility. Bladder spasms have improved with Bentyl. She is having multiple BMs per day on Miralax. She had IV started last night for low sodium.     ROS:  Denies fevers, chills, sweats.  No chest pain, palpitations, lightheadedness.  Denies coughing, wheezing or shortness of breath.  Denies abdominal pain, nausea, diarrhea or constipation.  No new areas of joint pain.  Denies new areas of numbness or weakness.  Denies new anxiety or depression issues.  She is c/o blisters on proximal L thigh    Rehabilitation:       PT:    Bed mobility  Bridging:  (Pt did not want to try mobility in p.m. d/t increased pain L LE)  Rolling to Left: Maximum assistance  Rolling to Right: Maximum assistance  Supine to Sit: Moderate assistance  Sit to Supine: Moderate assistance  Scooting: Minimal assistance  Bed Mobility Comments: Patient up in recliner prior to  AM session and returned to recliner post.         Transfers  Sit to Stand: Maximum Assistance (Max Ax1 with Magda Stedy, Max Ax2 with Upright Walker)  Stand to Sit: Maximum Assistance (Max Ax1)  Bed to Chair: Dependent/Total (using Magda Stey.  Requires 2 person assist for stand on Magda Mod-Max A x 2.  Poor posture and flexed forward when standing.)  Stand Pivot Transfers: Maximum Assistance, 2 Person Assistance (Pivot with Upright walker)  Squat Pivot Transfers: Maximum Assistance, 2 Person Assistance (2 person assist due to pt's fears)  Comment: N/A -attempted sit to stand transfers x 2/unable even with the bed height increased.  Transfers  Surface: Wheelchair  Additional Factors: With handrails, Increased time to complete  Device: Walker (rolling and upright walker)  Sit to Stand  Assistance Level: Moderate assistance  Skilled Clinical Factors: MOD A x2  650 mg, 650 mg, Oral, Q6H  polyethylene glycol (GLYCOLAX) packet 17 g, 17 g, Oral, Daily  rivaroxaban (XARELTO) tablet 10 mg, 10 mg, Oral, Daily  bisacodyl (DULCOLAX) suppository 10 mg, 10 mg, Rectal, Daily PRN  Facility-Administered Medications Ordered in Other Encounters: 0.9 % sodium chloride infusion 250 mL, 250 mL, IntraVENous, Once      Impression/Plan:   Impaired ADLs, gait, and mobility due to:      L hip fracture: s/p ISHAN 1/20/24 by Dr. Bonner. WBAT LLE with posterior hip precautions PT/OT  for gait, mobility, strengthening, endurance, ADLs, and self care. Has prn Tylenol, Tizanidine, Norco, Tylenol for pain. Staples removed 2/8/24.  L distal femur fracture: s/p L TKA revision by Dr. Keyes 1/23/24. WBAT LLE.   L ankle sprain: likely soft tissue injury. CT 2/7 negative. Has Voltaren gel with improvement. Therapy evaluating for possible brace   Acute blood loss anemia: Hb improved and stable.   Neurogenic bladder/urine retention: history benign spinal cord tumor. Performs self cath at home. Has Winters currently due to painful catheterizations. Anticipate until her mobility improves in LLE that she will require Winters then she can resume her intermittent self cath.  On chronic Macrobid for prophylaxis.   Bladder spasms: Improved on Bentyl.   Hyponatremia: Discussed with Dr. Rodgers today - he will discontinue IV fluid. Repeat BMP in am.    Hx CVA: on Plavix, atorvastatin. ASA on hold while on Xarelto  CAD: s/p stent.   HTN/HLD: metoprolol, Imdur on hold.   DM II: on glipizide, Lantus, sliding scale.  Chronic ITP: Monitoring  GERD: on pantoprazole:  Tremors: on primidone  RLS: on Requip  Anxiety/Depression: psychiatry consult declined. She is managing with spiritual care.   Sacral wound: wound care following  Obesity: BMI 36.47. Dietician following  Chronic constipation/neurogenic Bowel Management: Miralax daily, Senokot prn, Dulcolax prn. Linzess on hold  Internal medicine for medical management  DVT prophylaxis:

## 2024-02-11 NOTE — PROGRESS NOTES
assist x 1 standing in marisol sanchez to wipe post BM and don pants over hips, pt standing for this is improved from needing A x 2.  pt has reeves    Toilet Transfers  Equipment: Beside commode (over toilet)  Assistance Level: Dependent  Skilled Clinical Factors: marisol sanchez with A x 1, mod A sit to stand and stand to sit.               OT scores     Eating  Assistance Needed: Independent  CARE Score: 6  Oral Hygiene  Assistance Needed: Independent  CARE Score: 6  Toileting Hygiene  Assistance needed: Substantial/maximal assistance  CARE Score: 2  Shower/Bathe Self  Assistance Needed: Partial/moderate assistance  CARE Score: 3  Upper Body Dressing  Assistance Needed: Setup or clean-up assistance  CARE Score: 5  Lower Body Dressing  Assistance Needed: Partial/moderate assistance  CARE Score: 3  Putting On/Taking Off Footwear  Assistance Needed: Partial/moderate assistance  CARE Score: 3  Toilet Transfer  Assistance needed: Dependent  Comment: needs A x 1 with marisol sanchez with BSC over toilet  CARE Score: 1               Mobility          Functional Mobility  Device: Wheelchair  Activity: To/From bathroom  Assistance Level: Minimal assistance            OT Exercises  Static Standing Balance Exercises: clay 3-5 min x 3 in marisol sanchez with CGA once upright posture achieved.  Postural Correction Exercises: pt tolerates all day out of bed, alternating between w/c and reclining chair.  This is a significant improvement.  Disease-specific Exercises: pt notes L ankle tends to invert and requires a lot of effort to move to neutral AROM,  PROM provided for ankle eversion.  pt has L ankle sprain, new ankle support bought today to be trialed.                   Assessment  Assessment  Activity Tolerance: Patient limited by pain;Patient tolerated treatment well    Patient Education  Education  Education Given To: Patient  Education Provided: Precautions  Education Provided Comments: \"It helped.\" pt requests ace wrap to L ankle sprain again  today. OT notified LPN who notes she will tell Dr. Johnson, also OT provided ed to pt's friend who then bought pt a commercial ankle support from the store.  Education Method: Verbal  Barriers to Learning: None  Education Outcome: Verbalized understanding         Safety Devices  Type of devices: Left in chair;Call light within reach;Chair alarm in place       Goals  Patient Goals   Patient goals : needs to be able to stand to self cath for toileting  Short Term Goals  Time Frame for Short Term Goals: 1 week  2-11 all STG met  Short Term Goal 1: set up UE dressing.  Short Term Goal 2: pt to clay 15 min seated EOB during adls, ex with SBA for static balance  Short Term Goal 3: Pt will tolerate standing 3+ minutes during functional activity with Max A  of 1 using marisol stedy.  Short Term Goal 4: min A with don pullups, pants over feet using equipment, mod x 2 to stand and don pants over hips using marisol stedy.  Short Term Goal 5: min A with don slipper socks with equipment.  Short Term Goal 6: Pt will participate in BUE shld, elbow AROM with min resistance 10 reps x 3 ex to increase work tolerance and independence with self care and mobility    Long Term Goals  Time Frame for Long Term Goals : By discharge  Long Term Goal 1: set up with don pullups, pants over feet using equipment, max x 1 to stand and don pants over hips with UE support./ 2-11 met  Long Term Goal 2: max x 1 SPT to toilet./ unable to meet, progres made, but still requires marisol stedy  Long Term Goal 3: set up don slipper socks with equipment./ 2-11 met  Long Term Goal 4: clay 4-5 min stand with BUE support and max x 1 during adls, ex./2-11  met and surpassed, in marisol stedy with BUE support needs only CGA    Plan  Occupational Therapy Plan  Times Per Week: 5-7  Times Per Day: Twice a day  Current Treatment Recommendations: Self-Care / ADL, Balance training, Functional mobility training, Endurance training, Pain management, Safety education & training,

## 2024-02-11 NOTE — PROGRESS NOTES
IV fluids 0.9NS discontinued, IV access from right forearm removed as ordered. Patient sitting up in recliner with  as chair side visiting.

## 2024-02-11 NOTE — PROGRESS NOTES
smoking about 71 years ago. She has a 15.0 pack-year smoking history. She quit smokeless tobacco use about 41 years ago.  Alcohol:      reports no history of alcohol use.  Drug Use:  reports no history of drug use.    Family History:     Family History   Problem Relation Age of Onset    Breast Cancer Mother     Cancer Mother         breast and uterine  41    Heart Disease Father     Heart Attack Father          32    Cancer Maternal Grandmother     Cancer Brother 52        bronchogenic adenocarcinoma cancer    Lung Cancer Brother     Cancer Sister         lung    Lung Cancer Sister          65    Breast Cancer Sister          57    Cancer Sister         breast       Review of Systems:     Positive and Negative as described in HPI.    CONSTITUTIONAL:  negative for fevers, chills, sweats, fatigue, weight loss  HEENT:  negative for vision, hearing changes, runny nose, throat pain  RESPIRATORY:  negative for shortness of breath, cough, congestion, wheezing.  CARDIOVASCULAR:  negative for chest pain, palpitations.  GASTROINTESTINAL:  negative for nausea, vomiting, diarrhea, constipation, change in bowel habits, abdominal pain   GENITOURINARY:  negative for difficulty of urination, burning with urination, frequency   INTEGUMENT:  negative for rash, skin lesions, easy bruising   HEMATOLOGIC/LYMPHATIC:  negative for swelling/edema   ALLERGIC/IMMUNOLOGIC:  negative for urticaria , itching  ENDOCRINE:  negative increase in drinking, increase in urination, hot or cold intolerance  MUSCULOSKELETAL:  negative joint pains, muscle aches, swelling of joints  NEUROLOGICAL:  negative for headaches, dizziness, lightheadedness, numbness, pain, tingling extremities  BEHAVIOR/PSYCH:  negative for depression, anxiety    Physical Exam:   BP (!) 142/78   Pulse 95   Temp 98.1 °F (36.7 °C) (Oral)   Resp 18   Ht 1.44 m (4' 8.69\")   Wt 73.6 kg (162 lb 4.8 oz)   SpO2 96%   BMI 35.50 kg/m²   Temp (24hrs),  Problem  Closed fracture of left hip with routine healing    Active Hospital Problems    Diagnosis Date Noted    Rectal prolapse [K62.3] 01/29/2024    Closed fracture of left hip with routine healing [S72.002D] 01/26/2024       Plan:     Patient is 83-year-old female with multiple comorbidities s/p total hip arthroplasty and s/p total knee arthroplasty on 1/23 now admitted at [facility] acute rehab for further care    History of CVA, on Plavix and Lipitor,  C  CAD s/p stent, on Plavix Xarelto Lipitor, currently denies any chest pain shortness of breath on    Hypertension blood pressures currently controlled, PERRL, conjunctiva normal Imdur Lopressor    Neurogenic bladder, patient self caths at home, has history of benign spinal cord tumor s/p resection, patient on chronic nitrofurantoin therapy for recurrent UTIs, will resume    Hyperlipidemia on Lipitor    Type 2 diabetes mellitus, blood sugars has been stable on Lantus and sliding scale    Chronic thrombocytopenia secondary to ITP follows up with hematology oncology as outpatient    Anemia, likely acute anemia of blood loss secondary to postop  Hemoglobin was 8.7, no active bleeding present, continue to monitor    DVT prophylaxis Xarelto    1/289Patient seen and examined, complaining of constipation  On stool softeners  Vitals have been stable  Resumed on nitrofurantoin patient takes nitrofurantoin as suppressive therapy for recurrent UTIs next  Anemia of blood loss, continue to monitor no active bleeding present  On Xarelto and Plavix,   As per Ortho recommendations,  Resume aspirin when off Xarelto.    1/29  Patient seen and examined, continues to complain of pain at the surgical site  Blood sugars been stable  Blood pressure has been borderline lower side    Will DC amlodipine  Drop in hemoglobin hemoglobin of 7.7 likely postop    Needs CBC tomorrow  Get iron studies  On Plavix and Xarelto    1/30  Patient seen and examined  Patient was orthostatic

## 2024-02-11 NOTE — PROGRESS NOTES
Physical Therapy  Facility/Department: [facility] [[email or web address] ACUTE REHAB  Rehabilitation Physical Therapy Treatment Note    NAME: Komal Crawford  : 1940 (83 y.o.)  MRN: [id number]  CODE STATUS: Full Code    Date of Service: 24       Restrictions:  Restrictions/Precautions: Fall Risk, Weight Bearing, Contact Precautions, Bed Alarm, Up as Tolerated  Lower Extremity Weight Bearing Restrictions  Right Lower Extremity Weight Bearing: Weight Bearing As Tolerated  Left Lower Extremity Weight Bearing: Weight Bearing As Tolerated  Position Activity Restriction  Hip Precautions: Posterior hip precautions  Other position/activity restrictions: Pt had a (L) ISHAN  by Dr. JORDYN Bonner on 2024. Pt had surgery on 2024 due to a Periprosthetic fracture of femur at tip of prosthesis for a (L) TKA revision by Dr. AMEE Keyes.     SUBJECTIVE  Subjective  Subjective: Pt positioned in WC, pleasantly agreeable to tx. Pt  expressing anxiety about meeting performance day requirements   Pain: 3/10 in L Knee              OBJECTIVE  Cognition  Overall Cognitive Status: [facility]  Cognition Comment: good cognition demo.  pt has good memory, problem solving , initiation and sequencing.  pt  is motivated and consistently does tasks as independently as she can.  pt demo great carryover of therapy techniques with equipment and with initiating prep tasks getting ready to stand.  Orientation  Overall Orientation Status: Within Functional Limits  Orientation Level: Oriented to place;Oriented to time;Oriented to situation;Oriented to person;Oriented X4    Functional Mobility  Transfers  Surface: Wheelchair  Additional Factors: With handrails;Increased time to complete  Device: Walker (rolling and upright walker)  Sit to Stand  Assistance Level: Moderate assistance  Stand to Sit  Assistance Level: Contact guard assist  Bed To/From Chair  Technique: Stand pivot  Assistance Level: Contact guard assist  Stand Pivot  Assistance Level: Contact guard

## 2024-02-12 VITALS
DIASTOLIC BLOOD PRESSURE: 54 MMHG | WEIGHT: 162.3 LBS | BODY MASS INDEX: 35.02 KG/M2 | OXYGEN SATURATION: 100 % | HEIGHT: 57 IN | SYSTOLIC BLOOD PRESSURE: 124 MMHG | TEMPERATURE: 98.2 F | RESPIRATION RATE: 18 BRPM | HEART RATE: 80 BPM

## 2024-02-12 LAB
ANION GAP SERPL CALCULATED.3IONS-SCNC: 10 MMOL/L (ref 9–17)
BUN SERPL-MCNC: 13 MG/DL (ref 8–23)
CALCIUM SERPL-MCNC: 8.7 MG/DL (ref 8.6–10.4)
CHLORIDE SERPL-SCNC: 106 MMOL/L (ref 98–107)
CO2 SERPL-SCNC: 22 MMOL/L (ref 20–31)
CREAT SERPL-MCNC: 0.7 MG/DL (ref 0.5–0.9)
GFR SERPL CREATININE-BSD FRML MDRD: >60 ML/MIN/1.73M2
GLUCOSE BLD-MCNC: 109 MG/DL (ref 65–105)
GLUCOSE SERPL-MCNC: 114 MG/DL (ref 70–99)
POTASSIUM SERPL-SCNC: 4.2 MMOL/L (ref 3.7–5.3)
SODIUM SERPL-SCNC: 138 MMOL/L (ref 135–144)

## 2024-02-12 PROCEDURE — 97535 SELF CARE MNGMENT TRAINING: CPT

## 2024-02-12 PROCEDURE — 97110 THERAPEUTIC EXERCISES: CPT

## 2024-02-12 PROCEDURE — 82947 ASSAY GLUCOSE BLOOD QUANT: CPT

## 2024-02-12 PROCEDURE — 97530 THERAPEUTIC ACTIVITIES: CPT

## 2024-02-12 PROCEDURE — 6370000000 HC RX 637 (ALT 250 FOR IP): Performed by: INTERNAL MEDICINE

## 2024-02-12 PROCEDURE — 6370000000 HC RX 637 (ALT 250 FOR IP): Performed by: PHYSICAL MEDICINE & REHABILITATION

## 2024-02-12 PROCEDURE — 99239 HOSP IP/OBS DSCHRG MGMT >30: CPT | Performed by: PHYSICAL MEDICINE & REHABILITATION

## 2024-02-12 PROCEDURE — 80048 BASIC METABOLIC PNL TOTAL CA: CPT

## 2024-02-12 PROCEDURE — 36415 COLL VENOUS BLD VENIPUNCTURE: CPT

## 2024-02-12 PROCEDURE — 97116 GAIT TRAINING THERAPY: CPT

## 2024-02-12 RX ADMIN — PRIMIDONE 50 MG: 50 TABLET ORAL at 07:37

## 2024-02-12 RX ADMIN — RANOLAZINE 500 MG: 500 TABLET, EXTENDED RELEASE ORAL at 07:37

## 2024-02-12 RX ADMIN — ACETAMINOPHEN 650 MG: 325 TABLET, FILM COATED ORAL at 12:16

## 2024-02-12 RX ADMIN — GLIPIZIDE 5 MG: 10 TABLET ORAL at 06:30

## 2024-02-12 RX ADMIN — HYDROCODONE BITARTRATE AND ACETAMINOPHEN 1 TABLET: 5; 325 TABLET ORAL at 09:05

## 2024-02-12 RX ADMIN — ACETAMINOPHEN 650 MG: 325 TABLET, FILM COATED ORAL at 06:30

## 2024-02-12 RX ADMIN — ACETAMINOPHEN 650 MG: 325 TABLET, FILM COATED ORAL at 00:03

## 2024-02-12 RX ADMIN — DICLOFENAC SODIUM 4 G: 10 GEL TOPICAL at 09:36

## 2024-02-12 RX ADMIN — NITROFURANTOIN MONOHYDRATE/MACROCRYSTALS 100 MG: 75; 25 CAPSULE ORAL at 07:36

## 2024-02-12 RX ADMIN — CLOPIDOGREL BISULFATE 75 MG: 75 TABLET ORAL at 07:38

## 2024-02-12 RX ADMIN — PANTOPRAZOLE SODIUM 40 MG: 40 TABLET, DELAYED RELEASE ORAL at 06:30

## 2024-02-12 NOTE — CARE COORDINATION
SNF: [facility] transportation scheduled for today  time at 2 pm. Patient, RN, and facility notified.    Number to call report: [phone].

## 2024-02-12 NOTE — PLAN OF CARE
Problem: Safety - Adult  Goal: Free from fall injury  2/12/2024 0004 by Ramila Sanchez RN  Outcome: Progressing   No falls/injuries this shift, bed in lowest position, brakes on, bed alarm on, call light in reach, side rails up x2  Problem: ABCDS Injury Assessment  Goal: Absence of physical injury  2/12/2024 0004 by Ramila Sanchez, RN  Outcome: Progressing   No falls/injuries this shift, bed in lowest position, brakes on, bed alarm on, call light in reach, side rails up x2  Problem: Skin/Tissue Integrity  Goal: Absence of new skin breakdown  Description: 1.  Monitor for areas of redness and/or skin breakdown  2.  Assess vascular access sites hourly  3.  Every 4-6 hours minimum:  Change oxygen saturation probe site  4.  Every 4-6 hours:  If on nasal continuous positive airway pressure, respiratory therapy assess nares and determine need for appliance change or resting period.  2/12/2024 0004 by Ramila Sanchez RN  Outcome: Progressing   No new skin breakdown noted, no new signs/symptoms of infection, continue to monitor labwork including WBC, medications administered per physician orders  Problem: Pain  Goal: Verbalizes/displays adequate comfort level or baseline comfort level  2/12/2024 0004 by Ramila Sanchez RN  Outcome: Progressing   No new signs/symptoms of pain noted, pain rating < 3 on scale 0-10, pain controlled with medication/repositioning

## 2024-02-12 NOTE — PROGRESS NOTES
ACUTE INPATIENT REHABILITATION DISCHARGE  [facility]    Patient Name: Komal Crawford  MRN: [id number]     Patient discharged in stable condition as per order of attending physician.     AVS provided by nurse at time of discharge, which includes all necessary medical information pertaining to the patients current course of illness, treatment, medications, post-discharge goals of care, and treatment preferences.     Provision of Current Reconciled Medication List to Patient at Discharge   Indicate the route(s) of transmission of the current reconciled medication list to the patient/family/caregiver. Verbal (e.g. in person, telephone, video conferencing) and Paper Based (e.g. fax, copies, print outs)     Availability of \"My Chart\" offered to patient as a tool for updated health record.  Steps for activation discussed with patient as mentioned on AVS.      Patient/responsible party verbalize understanding of discharge plan and are in agreement with goal/plan/treatment preferences.     Belongings including  clothing, shoes, toiletries, boot  sent with patient/responsible party.      Home medications sent home with patient/responsible party N/A    Car Transfer   Level of assistance required for patient transfer into vehicle:   PARTIAL/MODERATE ASSISTANCE: Filley does LESS THAN HALF the effort. Filley lifts, holds or supports trunk or limbs, but provides less than half the effort.     High-Risk Drug Classes: Use and Indication   Check if the patient is taking any medications by pharmacological classification If yes, check if there is an indication noted for all meds in the drug class   Antipsychotic No If yes: indication noted?  [] If no indication noted, follow up with provider for order clarification   Anticoagulant Yes If yes: indication noted?  []    Antibiotic No If yes: indication noted?  []    Opioid Yes If yes: indication noted?  []    Antiplatelet Yes If yes: indication noted?  []    Hypoglycemic (Including  Insulin) Yes If yes: indication noted?  []        Pain Assessment   Over the past 5 days, how much of the time has pain made it hard for you to sleep at night?   Rarely or not at all   Over the past 5 days, how often have you limited your participation in rehabilitation therapy sessions due to pain?   Rarely or not at all   Over the past 5 days, how often have you limited your day-to-day activities (excluding rehabilitation therapy session)?   Rarely or not at all     Special Treatments, Procedures, and Programs   Check all of the following treatments, procedures, and programs that apply on admission.    Cancer Treatments   Chemotherapy No   If Yes, Check All That Apply   []IV Chemotherapy    []Oral Chemotherapy    [] Chemotherapy    Radiation No   Respiratory Therapies   Oxygen Therapy No  If Yes, Check All That Apply   []Continuous   []Intermittent    []High-Concentration    Suctioning No  If Yes, Check All That Apply   []Scheduled   []As Needed   Tracheostomy Care No   Invasive Mechanical Ventilator   (Ventilator or Respirator) No  If Yes, Check All That Apply   [] Non-invasive Mechanical Ventilator   []BiPAP   []CPAP   Other   IV Medications No  If Yes, Check All That Apply   [] IV Vasoactive Medications   []IV Antibiotics   []IV Anticoagulation   [] IV Medications   Transfusions No   Dialysis No  If Yes, Check All That Apply   []Hemodialysis   [] Dialysis   IV Access No  If Yes, Check All That Apply   []Peripheral   []Midline   [] (PICC, tunneled, port)

## 2024-02-12 NOTE — DISCHARGE SUMMARY
Physical Medicine & Rehabilitation  Discharge Summary     Patient Identification:  Komal Crawford  : 1940  Admit date: 2024  Discharge date: 2024   Attending provider: Angela Sutton MD      Discharging provider: BETTY DUBOSE MD    Primary care provider: Sabiha Bedolla MD     Discharge Diagnoses:   L hip fracture  L distal femur fracture  L ankle sprain  Acute blood loss anemia  Neurogenic bladder with urine retention  Bladder spasms  Hyponatremia  Hx CVA/CAD  HTN  Hyperlipidemia  DM II  Chronic ITP  Tremors  RLS  Anxiety/Depression  Sacral wound  Chronic constipation/neurogenic bowel  Obesity    Discharge Functional Status:    Physical Therapy:  Bed Mobility:   Bed mobility  Bridging:  (Pt did not want to try mobility in p.m. d/t increased pain L LE)  Rolling to Left: Maximum assistance  Rolling to Right: Maximum assistance  Supine to Sit: Moderate assistance  Sit to Supine: Moderate assistance  Scooting: Minimal assistance  Bed Mobility Comments: Patient up in recliner prior to  AM session and returned to recliner post.         Transfers:   Transfers  Sit to Stand: Maximum Assistance (Max Ax1 with Magda Stedy, Max Ax2 with Upright Walker)  Stand to Sit: Maximum Assistance (Max Ax1)  Bed to Chair: Dependent/Total (using Magda Stey.  Requires 2 person assist for stand on Magda Mod-Max A x 2.  Poor posture and flexed forward when standing.)  Stand Pivot Transfers: Maximum Assistance, 2 Person Assistance (Pivot with Upright walker)  Squat Pivot Transfers: Maximum Assistance, 2 Person Assistance (2 person assist due to pt's fears)  Comment: N/A -attempted sit to stand transfers x 2/unable even with the bed height increased.  Transfers  Surface: Wheelchair  Additional Factors: With handrails, Increased time to complete  Device: Walker (rolling and upright walker)  Sit to Stand  Assistance Level: Moderate assistance  Skilled Clinical Factors: MOD A x2 prgressing to MAX A x2 with fatigue  Stand to  same as other medications prescribed for you. Read the directions carefully, and ask your doctor or other care provider to review them with you.                CHANGE how you take these medications      HYDROcodone-acetaminophen 5-325 MG per tablet  Commonly known as: NORCO  Take 1 tablet by mouth every 6 hours as needed for Pain for up to 3 days. Max Daily Amount: 4 tablets  What changed: additional instructions            CONTINUE taking these medications      acetaminophen 500 MG tablet  Commonly known as: TYLENOL  Take 2 tablets by mouth every 6 hours as needed for Pain     atorvastatin 10 MG tablet  Commonly known as: LIPITOR     bisacodyl 5 MG EC tablet  Commonly known as: DULCOLAX  Take 1 tablet by mouth 2 times daily as needed for Constipation     clopidogrel 75 MG tablet  Commonly known as: PLAVIX     CRANBERRY PO     esomeprazole 40 MG delayed release capsule  Commonly known as: NEXIUM  Take 1 capsule by mouth every morning (before breakfast)     FreeStyle Mabel 14 Day Castana Patt     FreeStyle Mabel 14 Day Sensor Misc     glipiZIDE 5 MG tablet  Commonly known as: GLUCOTROL     [employer] Pen [place] 29G 29G X 12MM Misc  Generic drug: Insulin Pen Needle  1 each by Does not apply route daily     Lantus SoloStar 100 UNIT/ML injection pen  Generic drug: insulin glargine  Inject 10 Units into the skin nightly     polyethylene glycol 17 g packet  Commonly known as: GLYCOLAX  Take 1 packet by mouth daily as needed for Constipation     primidone 50 MG tablet  Commonly known as: MYSOLINE  Take 1 tablet by mouth 2 times daily     ranolazine 500 MG extended release tablet  Commonly known as: RANEXA  Take 1 tablet by mouth 2 times daily     rivaroxaban 10 MG Tabs tablet  Commonly known as: XARELTO  Take 1 tablet by mouth daily     rOPINIRole 4 MG tablet  Commonly known as: REQUIP  Take 1 tablet by mouth nightly     tiZANidine 2 MG tablet  Commonly known as: ZANAFLEX  Take 1 tablet by mouth nightly as needed (for pain)

## 2024-02-12 NOTE — CARE COORDINATION
[facility] Acute Inpatient Rehabilitation  Case Management Discharge Note    Discharge Disposition: SNF: [facility]    Discharge Transportation Method: [facility] ,  Time: 2 pm    IMM Letter Status: Patient/Responsible Party agreeable with discharge: Second Signature Obtained, Patient/Responsible Party offered four hours to make informed decision regarding appeal process - Completed Letter Placed in Patient Chart    Home Healthcare Services: Not Applicable    Outpatient Therapy Services: Not Applicable    DME:  N/A    Current reconciled medication list sent to subsequent provider via: Electronic Health Record      Patient Health Questionnaire-9 (PHQ-2 to 9)   Over the last 2 weeks, how often have you been bothered by any of the following problems?    1. Little Interest or pleasure in doing things?   Never or 1 Day - Score 0    2. Feeling down, depressed or hopeless?   Never or 1 Day - Score 0    3. Trouble falling or staying asleep, or sleeping too much?   Never or 1 Day - Score 0    4. Feeling tired or having little energy?   Never or 1 Day - Score 0    5. Poor appetite or overeating?   Never or 1 Day - Score 0    6. Feeling bad about yourself-or that you are a failure or have let yourself or your family down?   Never or 1 Day - Score 0    7. Trouble concentrating on things, such as reading the newspaper or watching television?    Never or 1 Day - Score 0    8. Moving or speaking so slowly that other people could have noticed? Or the opposite-being so fidgety or restless that you have been moving around a lot more than usual?  Never or 1 Day - Score 0    9. Thoughts that you would be better off dead or of hurting yourself in some way?   Never or 1 Day - Score 0    Total Score: 0  If score is above 15, Notify PM&R Physician    If you checked off any problems, how difficult have these problems made it for you to do your work, take care of things at home, or get along with other

## 2024-02-12 NOTE — INTERDISCIPLINARY ROUNDS
[facility] Acute Inpatient Rehabilitation  INTERDISCIPLINARY MEETING  Date: 24  Patient Name: Komal Crawford       Room: 2608/2608-01  MRN: [id number]       : 1940  (83 y.o.)     Gender: female     Patient's care team, including nursing, speech language pathologist, occupational therapist, and/or physical therapist, met to discuss patient's care needs. Any patient concerns, change in medical status, and current transfer/mobility status were identified at this time.     Any Additional Pertinent Information:   Tentative discharge today at 2 PM at [facility]    Participating Team Members:  Nurse: Hazel Skinner RN    Occupational Therapist:  Gisela Agee OT   Physical Therapist: Zoya Ricks PT  Speech Therapist:  N/A

## 2024-02-12 NOTE — PROGRESS NOTES
Physical Therapy  Facility/Department: [facility] ACUTE REHAB    NAME: Komal Crawford  : 1940 (83 y.o.)  MRN: [id number]  CODE STATUS: Full Code    Date of Service: 24      Past Medical History:   Diagnosis Date    Age-related cognitive decline     Ankle pain     Left ankle fracture in ortho boat.    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    Caffeine use     2 coffee/day, 1-2 tea per week    Cerebral artery occlusion with cerebral infarction (HCC)     Chronic back pain     Chronic ITP (idiopathic thrombocytopenia) (HCC)     CVA (cerebral infarction) ,     balance issues, short term memory loss    Diabetic gastroparesis (HCC)     Diverticular disease     GERD (gastroesophageal reflux disease)     Hiatal hernia     Hip fracture (HCC)     History of blood transfusion     History of decreased platelet count     Hyperlipemia     Hypertension     Internal hemorrhoid     Macular degeneration of left eye     S/P laser treatment    Nausea and vomiting     Neuropathy     Osteoarthritis     Ringing in ears     Self-catheterizes urinary bladder     6-7 times daily    TIA (transient ischemic attack) 2000    x1    Tubular adenoma     colon polyps    Type II or unspecified type diabetes mellitus without mention of complication, not stated as uncontrolled     Urinary incontinence     frequent UTI s/p infection, surgical dilatation lead to incontinence    Wears dentures     full on top, partial on bottom    Wears glasses      Past Surgical History:   Procedure Laterality Date    APPENDECTOMY      BLADDER SURGERY      bladder stimulator    BLADDER SUSPENSION      multiple    CARDIAC CATHETERIZATION      no stents    CARDIOVASCULAR STRESS TEST  2014    CATARACT REMOVAL WITH IMPLANT Left 2017    Tyree/Sulaiman    CATARACT REMOVAL WITH IMPLANT Right 2017    Tyree/Sulaiman     Jon TA [facility] at [facility] OR    UPPER GASTROINTESTINAL ENDOSCOPY      UPPER GASTROINTESTINAL ENDOSCOPY  05/05/2014    UPPER GASTROINTESTINAL ENDOSCOPY  04/07/2016    mild gastritis    UPPER GASTROINTESTINAL ENDOSCOPY N/A 07/17/2018    retained thick secretions in proximal esophagus       Chart Reviewed: Yes  Additional Pertinent Hx: Komal Crawford is a 83 y.o. female with history of coronary artery disease, diabetes, TIA, hypertension who presents with left hip pain after a mechanical fall that occurred today.  Patient states she was at home and had bent over to  her dog when her left leg gave out and she felt a snapping sensation in her left femur/hip area.  Patient was unable to stand after the incident and called EMS. Patient recently underwent a (L) TKA revision and a (L) ISHAN and has been admitted to acute rehab to further addresss the physical impairments and activiy limitations that are present.  Family / Caregiver Present: No  Referring Practitioner: Michael Harrell MD/Angela Sutton MD  Diagnosis: Closed (L_ hip fracture with malunion. Post - op (L) ISHAN and (L) TKA    Restrictions:  Restrictions/Precautions: Fall Risk;Weight Bearing;Contact Precautions;Bed Alarm;Up as Tolerated  Lower Extremity Weight Bearing Restrictions  Right Lower Extremity Weight Bearing: Weight Bearing As Tolerated  Left Lower Extremity Weight Bearing: Weight Bearing As Tolerated  Position Activity Restriction  Hip Precautions: Posterior hip precautions  Other position/activity restrictions: Pt had a (L) ISHAN  by Dr. JORDYN Bonner on 1-. Pt had surgery on 1- due to a Periprosthetic fracture of femur at tip of prosthesis for a (L) TKA revision by Dr. AMEE Keyes.     SUBJECTIVE  Subjective: Pt reports sleeping well throguhout the night  Pain: Pt reports pain in bottom of L foot 3/10 pain  OBJECTIVE      Functional Mobility  Transfers  Sit to Stand: Maximum Assistance  Stand to Sit: Maximum Assistance  Stand Pivot Transfers: Maximum

## 2024-02-12 NOTE — PROGRESS NOTES
Occupational Therapy  [facility] Occupational Therapy Daily Treatment Note    Date: 24  Patient Name: Komal Crawford       Room: 2608/2608-01  MRN: [id number]  Account: [id number]   : 1940  (83 y.o.) Gender: female          Diagnosis: Admitted with left femoral neck and shane-prosthetic femur fracture post fall,  L THR 24.   left hip fracture s/p total hip arthroplasty 24. , left distal femur fracture s/p total knee arthroplasty revision 24.  24 per Dr. Johnson: L ankle sprain: likely soft tissue injury. CT  negative.Will start Voltaren gel  Additional Pertinent Hx: Expand All Collapse All    Physical Medicine & Rehabilitation History and Physical  [facility]      Primary care provider:  Sabiha Bedolla MD      Chief Complaint and Reason for Rehabilitation Admission:   ADL and mobility deficits secondary to left hip fracture s/p total hip arthroplasty, left distal femur fracture s/p total knee arthroplasty revision     History of Present Illness:  Komal Crawford is a 83 y.o. 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  She was originally admitted to [facility] on 24.     She initially presented after a mechanical fall when attempting to  her dog and her left leg gave out.  Denied hitting her head.  Initial x-rays did not show any pelvic or hip fractures but did show moderate to severe osteoarthritis of the left hip.  MRI left hip showed suspected posttraumatic marrow edema and probably impaction fracture at the superolateral left femoral head.  She was also found to have comminuted impacted fracture of the left distal femur.  She underwent left total hip arthroplasty  with A x 1, min A sit to stand and stand to sit.      Mobility    Supine to Sit  Assistance Level: Minimal assistance  Skilled Clinical Factors: pt attempts to use leg  to assist with LLE progression however requires Min A due to pain     Sit to Stand  Assistance Level: Minimal assistance  Skilled Clinical Factors: Min A x1 in Marisol Stedy  Stand to Sit  Assistance Level: Minimal assistance  Skilled Clinical Factors: Min A x1 in Marisol Stedy     Functional Mobility  Device: Wheelchair  Activity: To/From bathroom  Assistance Level: Minimal assistance  Skilled Clinical Factors: slight A to maneuver into doorway    Assessment  Assessment  Activity Tolerance: Patient limited by pain;Patient tolerated treatment well  Discharge Recommendations: Patient would benefit from continued therapy after discharge    Patient Education  Education  Education Given To: Patient  Education Provided: Precautions;Role of Therapy;Plan of Care;Transfer Training  Education Method: Verbal  Barriers to Learning: None  Education Outcome: Verbalized understanding    OT Equipment Recommendations  Other: TBD    Safety Devices  Safety Devices in place: Yes (left pt in w/c with PTA Aster and RN Hazel)  Restraints  Initially in place: No    Goals  Patient Goals   Patient goals : needs to be able to stand to self cath for toileting  Short Term Goals  Time Frame for Short Term Goals: 1 week  2-11 all STG met  Short Term Goal 1: set up UE dressing.  Short Term Goal 2: pt to clay 15 min seated EOB during adls, ex with SBA for static balance  Short Term Goal 3: Pt will tolerate standing 3+ minutes during functional activity with Max A  of 1 using marisol stedy.  Short Term Goal 4: min A with don pullups, pants over feet using equipment, mod x 2 to stand and don pants over hips using marisol stedy.  Short Term Goal 5: min A with don slipper socks with equipment.  Short Term Goal 6: Pt will participate in BUE shld, elbow AROM with min resistance 10 reps x 3 ex

## 2024-02-14 ENCOUNTER — HOSPITAL ENCOUNTER (OUTPATIENT)
Age: 84
Setting detail: SPECIMEN
Discharge: HOME | End: 2024-02-14

## 2024-02-14 LAB
ALBUMIN SERPL-MCNC: 3.1 G/DL (ref 3.5–5.2)
ALBUMIN/GLOB SERPL: 0.9 {RATIO} (ref 1–2.5)
ALP SERPL-CCNC: 137 U/L (ref 35–104)
ALT SERPL-CCNC: 11 U/L (ref 5–33)
ANION GAP SERPL CALCULATED.3IONS-SCNC: 12 MMOL/L (ref 9–17)
AST SERPL-CCNC: 26 U/L
BILIRUB SERPL-MCNC: 0.6 MG/DL (ref 0.3–1.2)
BUN SERPL-MCNC: 18 MG/DL (ref 8–23)
CALCIUM SERPL-MCNC: 8.8 MG/DL (ref 8.6–10.4)
CHLORIDE SERPL-SCNC: 104 MMOL/L (ref 98–107)
CO2 SERPL-SCNC: 19 MMOL/L (ref 20–31)
CREAT SERPL-MCNC: 0.6 MG/DL (ref 0.5–0.9)
ERYTHROCYTE [DISTWIDTH] IN BLOOD BY AUTOMATED COUNT: 18.5 % (ref 11.8–14.4)
GFR SERPL CREATININE-BSD FRML MDRD: >60 ML/MIN/1.73M2
GLUCOSE SERPL-MCNC: 167 MG/DL (ref 70–99)
HCT VFR BLD AUTO: 36.2 % (ref 36.3–47.1)
HGB BLD-MCNC: 10.6 G/DL (ref 11.9–15.1)
MCH RBC QN AUTO: 29 PG (ref 25.2–33.5)
MCHC RBC AUTO-ENTMCNC: 29.3 G/DL (ref 28.4–34.8)
MCV RBC AUTO: 98.9 FL (ref 82.6–102.9)
NRBC BLD-RTO: 0 PER 100 WBC
PLATELET # BLD AUTO: ABNORMAL K/UL (ref 138–453)
PLATELET, FLUORESCENCE: 245 K/UL (ref 138–453)
PLATELETS.RETICULATED NFR BLD AUTO: 12.3 % (ref 1.1–10.3)
POTASSIUM SERPL-SCNC: 4 MMOL/L (ref 3.7–5.3)
PROT SERPL-MCNC: 6.4 G/DL (ref 6.4–8.3)
RBC # BLD AUTO: 3.66 M/UL (ref 3.95–5.11)
SODIUM SERPL-SCNC: 135 MMOL/L (ref 135–144)
WBC OTHER # BLD: 6.7 K/UL (ref 3.5–11.3)

## 2024-02-14 PROCEDURE — 80053 COMPREHEN METABOLIC PANEL: CPT

## 2024-02-14 PROCEDURE — 85055 RETICULATED PLATELET ASSAY: CPT

## 2024-02-14 PROCEDURE — P9603 ONE-WAY ALLOW PRORATED MILES: HCPCS

## 2024-02-14 PROCEDURE — 36415 COLL VENOUS BLD VENIPUNCTURE: CPT

## 2024-02-14 PROCEDURE — 85027 COMPLETE CBC AUTOMATED: CPT

## 2024-02-15 ENCOUNTER — TELEPHONE (OUTPATIENT)
Dept: ORTHOPEDIC SURGERY | Age: 84
End: 2024-02-15

## 2024-02-15 NOTE — TELEPHONE ENCOUNTER
Spoke to Caitlyn at [facility]. Scheduled the patient for a post operative visit with Dr. Keyes on 2/26/24 and informed her of the scheduled visit with Dr. Bonner on 2/27/24.

## 2024-02-16 ENCOUNTER — TELEPHONE (OUTPATIENT)
Dept: ORTHOPEDIC SURGERY | Age: 84
End: 2024-02-16

## 2024-02-16 NOTE — TELEPHONE ENCOUNTER
The patient's daughter and  came in to the office today and wondered if it would be possible to increase the patient's pt at the [facility]. The daughter states the patient is getting 1 hour of pt each day and they think she's not moving around enough.      She is currently S/P left TKA revision with Dr. Keyes on 1/23/24. She has a f/u on 2/26/24.     Any recommendations?

## 2024-02-16 NOTE — TELEPHONE ENCOUNTER
The patient's daughter and  came in to the office today and wondered if it would be possible to increase the patient's pt at the [facility]. The daughter states the patient is getting 1 hour of pt each day and they think she's not moving around enough.     She is S/P left ISHAN 1/20/24.   Her F/U is scheduled for 2/27/24.   Would you like to recommend more PT?
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
## 2024-02-19 ENCOUNTER — TELEPHONE (OUTPATIENT)
Dept: PRIMARY CARE CLINIC | Age: 84
End: 2024-02-19

## 2024-02-19 NOTE — TELEPHONE ENCOUNTER
Called [facility] and told them  Wanted to increase pts PT.  Spoke with the nurse and she said she would let the PT know.  If another order is necessary they will let me know.

## 2024-02-20 NOTE — TELEPHONE ENCOUNTER
I don't have privileges at [facility] or at [facility]. I cannot do transfers.   I don't know if she qualifies for the rehab unit at [facility], they have different criteria. She should have the  at [facility] contact the doctor at the rehab unit at [facility] to see if she qualifies.   
Pt asking for you to call her, states she is not getting the treatment she needs. Would like to transfer from [facility]. States she can't walk. Asking if she can be transferred back to [facility] for rehab? Was told this needed to come from pcp. She is asking for a call back [phone]  
Pt notified. Instructions given.  
independent

## 2024-02-22 ENCOUNTER — CARE COORDINATION (OUTPATIENT)
Dept: CARE COORDINATION | Age: 84
End: 2024-02-22

## 2024-02-22 NOTE — CARE COORDINATION
Writer called to check on patient. She remains at [facility] for rehab. She had regressed with therapy, was barely walking when she got there since only getting 1 hour of therapy a day, she was walking well at [facility] but was getting 5 hours of therapy a day there. Slowly starting to improve. She initially wanted to leave and go back to Acute Rehab but PCP not able to admit her there, she will stay there.    Her  is home alone but neighbors and Rastafarian friends are checking on him, driving him to see her a few times a week, she has no concerns with him being alone.    Will follow up with her in a few weeks to check if any discharge plans yet.    Future Appointments   Date Time Provider Department Center   2/26/2024  9:30 AM Damian Keyes MD [facility]   2/27/2024 10:50 AM Jon Bonner MD [facility]   3/18/2024  9:30 AM Nancy Bates MD U[facility]CER [facility]

## 2024-02-26 ENCOUNTER — OFFICE VISIT (OUTPATIENT)
Dept: ORTHOPEDIC SURGERY | Age: 84
End: 2024-02-26

## 2024-02-26 DIAGNOSIS — Z96.652 HISTORY OF TOTAL KNEE ARTHROPLASTY, LEFT: Primary | ICD-10-CM

## 2024-02-26 PROCEDURE — 99024 POSTOP FOLLOW-UP VISIT: CPT | Performed by: ORTHOPAEDIC SURGERY

## 2024-02-26 NOTE — PROGRESS NOTES
[facility]                   Damian Keyes M.D.            [address], [address]               [place], [place] [address]           Dept Phone: [phone]           Dept Fax:  [phone] [address]                       [address]           [place], [place] [address]          Dept Phone: [phone]           Dept Fax:  [phone]      Chief Compliant:  Chief Complaint   Patient presents with    Post-Op Check     Lt TKA 1/23/24        History of Present Illness:  Patient returns today status post left revision TKA times 3weeks for periprosthetic total knee femur fracture.  Patient has no major complaints     Review of Systems   Constitutional: Negative for fever, chills, sweats, recent injury, recent illness  Neurological: Negative for Headaches, numbness, or weakness.    Integumentary: Negative for rash, itching, ecchymosis, or wounds.   Musculoskeletal: Positive for Post-Op Check (Lt TKA 1/23/24)       Physical Exam:  Constitutional: Patient is oriented to person, place, and time. Patient appears well-developed and well nourished.   Musculoskeletal: Normal gait. Motion 0-100 degrees with expected pain with ROM. No Calf tenderness, Negative Lynette's sign. Neurovascular intact.  Neurological: Patient is alert and oriented to person, place, and time. Normal strenght. No sensory deficit.  Skin: Skin is warm and dry. Incision is healing well without signs of redness or drainage  Nursing note and vitals reviewed.   Patient is noted to have leg length discrepancy left longer than right of at least an inch and a half.  I will have this addressed by Dr. Bonner tomorrow she is seen for her total hip    Labs and Imaging:     XR taken today:  XR KNEE LEFT (1-2 VIEWS)    Result Date: 2/26/2024  X-rays taken today reviewed by me show AP lateral views of the patient's left knee and distal femur.  Patient is status post a revision left total knee arthroplasty with a

## 2024-02-27 ENCOUNTER — OFFICE VISIT (OUTPATIENT)
Dept: ORTHOPEDIC SURGERY | Age: 84
End: 2024-02-27

## 2024-02-27 VITALS — RESPIRATION RATE: 14 BRPM | HEIGHT: 57 IN | WEIGHT: 162 LBS | BODY MASS INDEX: 34.95 KG/M2

## 2024-02-27 DIAGNOSIS — Z96.642 STATUS POST TOTAL REPLACEMENT OF LEFT HIP: Primary | ICD-10-CM

## 2024-02-27 PROCEDURE — 99024 POSTOP FOLLOW-UP VISIT: CPT | Performed by: ORTHOPAEDIC SURGERY

## 2024-02-27 NOTE — PROGRESS NOTES
Patient ID: Komal Crawford is a 83 y.o. female    Chief Compliant:  Chief Complaint   Patient presents with    Post-Op Check        Diagnostic imaging:    AP pelvis lateral left hip status post left total hip arthroplasty previous x-rays demonstrating hinged total knee also visible    Assessment and Plan:  1. Status post total replacement of left hip      S/p left ISHAN, 1/20/2024. She reports that she has some difficulty walking still and she is doing PT for this issue.       Follow up with Dr. Keyes at her next appointment with him for her knee    HPI:  This is a 83 y.o. female who presents to the clinic today for follow up of left hip pain    S/p left ISHAN, 1/20/2024.    She reports that she has been doing PT but she reports that she has been having difficulty walking.     Patient still has some left groin pain with hip range of motion.     Patient presents today in a wheelchair.    Review of Systems   All other systems reviewed and are negative.      Past History:    Current Outpatient Medications:     insulin lispro (HUMALOG) 100 UNIT/ML SOLN injection vial, Inject 0-8 Units into the skin 3 times daily (with meals), Disp: , Rfl:     insulin lispro (HUMALOG) 100 UNIT/ML SOLN injection vial, Inject 0-4 Units into the skin nightly, Disp: , Rfl:     diclofenac sodium (VOLTAREN) 1 % GEL, Apply 4 g topically 2 times daily, Disp: , Rfl:     senna (SENOKOT) 8.6 MG tablet, Take 2 tablets by mouth nightly as needed (constipation), Disp: , Rfl:     nitrofurantoin, macrocrystal-monohydrate, (MACROBID) 100 MG capsule, Take 1 capsule by mouth every 12 hours Chronic suppression/prevention, Disp: 20 capsule, Rfl: 0    midodrine (PROAMATINE) 2.5 MG tablet, Take 1 tablet by mouth 3 times daily as needed (sbp <100), Disp: 90 tablet, Rfl: 3    dicyclomine (BENTYL) 10 MG capsule, Take 1 capsule by mouth 3 times daily as needed (For bladder spasm), Disp: 120 capsule, Rfl: 3    nystatin (MYCOSTATIN) 268978 UNIT/GM powder, Apply 3 times

## 2024-03-01 ENCOUNTER — HOSPITAL ENCOUNTER (OUTPATIENT)
Age: 84
Setting detail: SPECIMEN
Discharge: HOME | End: 2024-03-01

## 2024-03-01 LAB
BACTERIA URNS QL MICRO: ABNORMAL
BILIRUB UR QL STRIP: NEGATIVE
CLARITY UR: ABNORMAL
COLOR UR: ABNORMAL
EPI CELLS #/AREA URNS HPF: ABNORMAL /HPF (ref 0–5)
GLUCOSE UR STRIP-MCNC: ABNORMAL MG/DL
HGB UR QL STRIP.AUTO: ABNORMAL
KETONES UR STRIP-MCNC: NEGATIVE MG/DL
LEUKOCYTE ESTERASE UR QL STRIP: ABNORMAL
NITRITE UR QL STRIP: NEGATIVE
PH UR STRIP: 5.5 [PH] (ref 5–8)
PROT UR STRIP-MCNC: ABNORMAL MG/DL
RBC #/AREA URNS HPF: ABNORMAL /HPF (ref 0–2)
SP GR UR STRIP: 1.03 (ref 1–1.03)
UROBILINOGEN UR STRIP-ACNC: NORMAL EU/DL (ref 0–1)
WBC #/AREA URNS HPF: ABNORMAL /HPF (ref 0–5)
YEAST URNS QL MICRO: ABNORMAL

## 2024-03-01 PROCEDURE — 87077 CULTURE AEROBIC IDENTIFY: CPT

## 2024-03-01 PROCEDURE — 87186 SC STD MICRODIL/AGAR DIL: CPT

## 2024-03-01 PROCEDURE — 87086 URINE CULTURE/COLONY COUNT: CPT

## 2024-03-01 PROCEDURE — 81001 URINALYSIS AUTO W/SCOPE: CPT

## 2024-03-03 LAB
MICROORGANISM SPEC CULT: ABNORMAL
MICROORGANISM SPEC CULT: ABNORMAL
SERVICE CMNT-IMP: ABNORMAL
SPECIMEN DESCRIPTION: ABNORMAL

## 2024-03-04 ENCOUNTER — TELEPHONE (OUTPATIENT)
Dept: ORTHOPEDIC SURGERY | Age: 84
End: 2024-03-04

## 2024-03-04 NOTE — TELEPHONE ENCOUNTER
Pts PT called and wanted to know if its ok for them to give pt a lace up ankle brace.  I told them it was fine.

## 2024-03-05 ENCOUNTER — HOSPITAL ENCOUNTER (OUTPATIENT)
Age: 84
Setting detail: SPECIMEN
Discharge: HOME | End: 2024-03-05

## 2024-03-05 LAB
ERYTHROCYTE [DISTWIDTH] IN BLOOD BY AUTOMATED COUNT: 16.9 % (ref 11.8–14.4)
HCT VFR BLD AUTO: 30.8 % (ref 36.3–47.1)
HGB BLD-MCNC: 9.6 G/DL (ref 11.9–15.1)
MCH RBC QN AUTO: 29.3 PG (ref 25.2–33.5)
MCHC RBC AUTO-ENTMCNC: 31.2 G/DL (ref 28.4–34.8)
MCV RBC AUTO: 93.9 FL (ref 82.6–102.9)
NRBC BLD-RTO: 0 PER 100 WBC
PLATELET # BLD AUTO: ABNORMAL K/UL (ref 138–453)
PLATELET, FLUORESCENCE: 170 K/UL (ref 138–453)
PLATELETS.RETICULATED NFR BLD AUTO: 17.4 % (ref 1.1–10.3)
RBC # BLD AUTO: 3.28 M/UL (ref 3.95–5.11)
WBC OTHER # BLD: 6.3 K/UL (ref 3.5–11.3)

## 2024-03-05 PROCEDURE — 85055 RETICULATED PLATELET ASSAY: CPT

## 2024-03-05 PROCEDURE — P9603 ONE-WAY ALLOW PRORATED MILES: HCPCS

## 2024-03-05 PROCEDURE — 36415 COLL VENOUS BLD VENIPUNCTURE: CPT

## 2024-03-05 PROCEDURE — 85027 COMPLETE CBC AUTOMATED: CPT

## 2024-03-06 ENCOUNTER — APPOINTMENT (OUTPATIENT)
Dept: CT IMAGING | Age: 84
DRG: 981 | End: 2024-03-06
Payer: MEDICARE

## 2024-03-06 ENCOUNTER — APPOINTMENT (OUTPATIENT)
Dept: GENERAL RADIOLOGY | Age: 84
DRG: 981 | End: 2024-03-06
Payer: MEDICARE

## 2024-03-06 ENCOUNTER — HOSPITAL ENCOUNTER (INPATIENT)
Age: 84
LOS: 6 days | Discharge: NURSING, REHAB OR LONG-TERM CARE | DRG: 981 | End: 2024-03-12
Attending: EMERGENCY MEDICINE | Admitting: INTERNAL MEDICINE
Payer: MEDICARE

## 2024-03-06 DIAGNOSIS — L03.116 CELLULITIS OF LEFT LOWER EXTREMITY: ICD-10-CM

## 2024-03-06 DIAGNOSIS — R65.20 SEVERE SEPSIS (HCC): Primary | ICD-10-CM

## 2024-03-06 DIAGNOSIS — A41.9 SEPSIS, DUE TO UNSPECIFIED ORGANISM, UNSPECIFIED WHETHER ACUTE ORGAN DYSFUNCTION PRESENT (HCC): ICD-10-CM

## 2024-03-06 DIAGNOSIS — A41.9 SEVERE SEPSIS (HCC): Primary | ICD-10-CM

## 2024-03-06 DIAGNOSIS — S81.002A OPEN WOUND OF LEFT KNEE, INITIAL ENCOUNTER: ICD-10-CM

## 2024-03-06 DIAGNOSIS — Z96.659 INFECTION OF TOTAL KNEE REPLACEMENT, SUBSEQUENT ENCOUNTER: ICD-10-CM

## 2024-03-06 DIAGNOSIS — T84.59XD INFECTION OF TOTAL KNEE REPLACEMENT, SUBSEQUENT ENCOUNTER: ICD-10-CM

## 2024-03-06 DIAGNOSIS — M16.12 ARTHRITIS OF LEFT HIP: ICD-10-CM

## 2024-03-06 DIAGNOSIS — S81.002D OPEN WOUND OF LEFT KNEE, SUBSEQUENT ENCOUNTER: ICD-10-CM

## 2024-03-06 DIAGNOSIS — N30.00 ACUTE CYSTITIS WITHOUT HEMATURIA: ICD-10-CM

## 2024-03-06 LAB
ALBUMIN SERPL-MCNC: 3.8 G/DL (ref 3.5–5.2)
ALP SERPL-CCNC: 103 U/L (ref 35–104)
ALT SERPL-CCNC: 9 U/L (ref 5–33)
ANION GAP SERPL CALCULATED.3IONS-SCNC: 14 MMOL/L (ref 9–17)
AST SERPL-CCNC: 13 U/L
BACTERIA URNS QL MICRO: ABNORMAL
BASOPHILS # BLD: 0 K/UL (ref 0–0.2)
BASOPHILS NFR BLD: 0 % (ref 0–2)
BILIRUB SERPL-MCNC: 0.6 MG/DL (ref 0.3–1.2)
BILIRUB UR QL STRIP: NEGATIVE
BUN SERPL-MCNC: 14 MG/DL (ref 8–23)
BUN SERPL-MCNC: 14 MG/DL (ref 8–23)
CALCIUM SERPL-MCNC: 9.1 MG/DL (ref 8.6–10.4)
CHLORIDE SERPL-SCNC: 97 MMOL/L (ref 98–107)
CLARITY UR: ABNORMAL
CO2 SERPL-SCNC: 23 MMOL/L (ref 20–31)
COLOR UR: YELLOW
CREAT SERPL-MCNC: 0.7 MG/DL (ref 0.5–0.9)
CREAT SERPL-MCNC: 0.7 MG/DL (ref 0.5–0.9)
CRP SERPL HS-MCNC: 17.3 MG/L (ref 0–5)
EOSINOPHIL # BLD: 0 K/UL (ref 0–0.4)
EOSINOPHILS RELATIVE PERCENT: 0 % (ref 0–4)
EPI CELLS #/AREA URNS HPF: ABNORMAL /HPF
ERYTHROCYTE [DISTWIDTH] IN BLOOD BY AUTOMATED COUNT: 18.2 % (ref 11.5–14.9)
ERYTHROCYTE [SEDIMENTATION RATE] IN BLOOD BY PHOTOMETRIC METHOD: 19 MM/HR (ref 0–30)
EST. AVERAGE GLUCOSE BLD GHB EST-MCNC: 131 MG/DL
GFR SERPL CREATININE-BSD FRML MDRD: >60 ML/MIN/1.73M2
GFR SERPL CREATININE-BSD FRML MDRD: >60 ML/MIN/1.73M2
GLUCOSE BLD-MCNC: 112 MG/DL (ref 65–105)
GLUCOSE SERPL-MCNC: 158 MG/DL (ref 70–99)
GLUCOSE UR STRIP-MCNC: ABNORMAL MG/DL
HBA1C MFR BLD: 6.2 % (ref 4–6)
HCT VFR BLD AUTO: 35.9 % (ref 36–46)
HGB BLD-MCNC: 11.2 G/DL (ref 12–16)
HGB UR QL STRIP.AUTO: ABNORMAL
INR PPP: 1.4
KETONES UR STRIP-MCNC: ABNORMAL MG/DL
LACTATE BLDV-SCNC: 1.7 MMOL/L (ref 0.5–1.9)
LACTATE BLDV-SCNC: 2.5 MMOL/L (ref 0.5–2.2)
LACTATE BLDV-SCNC: 2.6 MMOL/L (ref 0.5–1.9)
LACTATE BLDV-SCNC: 3.1 MMOL/L (ref 0.5–2.2)
LEUKOCYTE ESTERASE UR QL STRIP: ABNORMAL
LYMPHOCYTES NFR BLD: 0.91 K/UL (ref 1–4.8)
LYMPHOCYTES RELATIVE PERCENT: 6 % (ref 24–44)
MCH RBC QN AUTO: 28.6 PG (ref 26–34)
MCHC RBC AUTO-ENTMCNC: 31.3 G/DL (ref 31–37)
MCV RBC AUTO: 91.1 FL (ref 80–100)
MONOCYTES NFR BLD: 0.91 K/UL (ref 0.1–1.3)
MONOCYTES NFR BLD: 6 % (ref 1–7)
MORPHOLOGY: ABNORMAL
NEUTROPHILS NFR BLD: 88 % (ref 36–66)
NEUTS SEG NFR BLD: 13.28 K/UL (ref 1.3–9.1)
NITRITE UR QL STRIP: NEGATIVE
PH UR STRIP: 7 [PH] (ref 5–8)
PLATELET # BLD AUTO: 193 K/UL (ref 150–450)
PMV BLD AUTO: 12.7 FL (ref 6–12)
POTASSIUM SERPL-SCNC: 4.5 MMOL/L (ref 3.7–5.3)
PROCALCITONIN SERPL-MCNC: 0.13 NG/ML
PROT SERPL-MCNC: 7.1 G/DL (ref 6.4–8.3)
PROT UR STRIP-MCNC: ABNORMAL MG/DL
PROTHROMBIN TIME: 17 SEC (ref 11.8–14.6)
RBC # BLD AUTO: 3.93 M/UL (ref 4–5.2)
RBC #/AREA URNS HPF: ABNORMAL /HPF
SODIUM SERPL-SCNC: 134 MMOL/L (ref 135–144)
SP GR UR STRIP: 1.01 (ref 1–1.03)
UROBILINOGEN UR STRIP-ACNC: NORMAL EU/DL (ref 0–1)
WBC #/AREA URNS HPF: ABNORMAL /HPF
WBC OTHER # BLD: 15.1 K/UL (ref 3.5–11)

## 2024-03-06 PROCEDURE — 87040 BLOOD CULTURE FOR BACTERIA: CPT

## 2024-03-06 PROCEDURE — 86140 C-REACTIVE PROTEIN: CPT

## 2024-03-06 PROCEDURE — 6360000002 HC RX W HCPCS: Performed by: INTERNAL MEDICINE

## 2024-03-06 PROCEDURE — 6360000002 HC RX W HCPCS: Performed by: EMERGENCY MEDICINE

## 2024-03-06 PROCEDURE — 2580000003 HC RX 258: Performed by: EMERGENCY MEDICINE

## 2024-03-06 PROCEDURE — 82565 ASSAY OF CREATININE: CPT

## 2024-03-06 PROCEDURE — 87086 URINE CULTURE/COLONY COUNT: CPT

## 2024-03-06 PROCEDURE — 71045 X-RAY EXAM CHEST 1 VIEW: CPT

## 2024-03-06 PROCEDURE — 6370000000 HC RX 637 (ALT 250 FOR IP): Performed by: EMERGENCY MEDICINE

## 2024-03-06 PROCEDURE — 99223 1ST HOSP IP/OBS HIGH 75: CPT | Performed by: INTERNAL MEDICINE

## 2024-03-06 PROCEDURE — 96375 TX/PRO/DX INJ NEW DRUG ADDON: CPT

## 2024-03-06 PROCEDURE — 84145 PROCALCITONIN (PCT): CPT

## 2024-03-06 PROCEDURE — 85025 COMPLETE CBC W/AUTO DIFF WBC: CPT

## 2024-03-06 PROCEDURE — 83605 ASSAY OF LACTIC ACID: CPT

## 2024-03-06 PROCEDURE — 82947 ASSAY GLUCOSE BLOOD QUANT: CPT

## 2024-03-06 PROCEDURE — 93005 ELECTROCARDIOGRAM TRACING: CPT | Performed by: EMERGENCY MEDICINE

## 2024-03-06 PROCEDURE — 85652 RBC SED RATE AUTOMATED: CPT

## 2024-03-06 PROCEDURE — 96365 THER/PROPH/DIAG IV INF INIT: CPT

## 2024-03-06 PROCEDURE — 85610 PROTHROMBIN TIME: CPT

## 2024-03-06 PROCEDURE — 2580000003 HC RX 258: Performed by: INTERNAL MEDICINE

## 2024-03-06 PROCEDURE — 2060000000 HC ICU INTERMEDIATE R&B

## 2024-03-06 PROCEDURE — 2580000003 HC RX 258: Performed by: NURSE PRACTITIONER

## 2024-03-06 PROCEDURE — 36415 COLL VENOUS BLD VENIPUNCTURE: CPT

## 2024-03-06 PROCEDURE — 81001 URINALYSIS AUTO W/SCOPE: CPT

## 2024-03-06 PROCEDURE — 73700 CT LOWER EXTREMITY W/O DYE: CPT

## 2024-03-06 PROCEDURE — 6370000000 HC RX 637 (ALT 250 FOR IP): Performed by: INTERNAL MEDICINE

## 2024-03-06 PROCEDURE — 83036 HEMOGLOBIN GLYCOSYLATED A1C: CPT

## 2024-03-06 PROCEDURE — 80053 COMPREHEN METABOLIC PANEL: CPT

## 2024-03-06 PROCEDURE — 99285 EMERGENCY DEPT VISIT HI MDM: CPT

## 2024-03-06 PROCEDURE — 84520 ASSAY OF UREA NITROGEN: CPT

## 2024-03-06 RX ORDER — SENNOSIDES A AND B 8.6 MG/1
2 TABLET, FILM COATED ORAL NIGHTLY PRN
Status: DISCONTINUED | OUTPATIENT
Start: 2024-03-06 | End: 2024-03-12 | Stop reason: HOSPADM

## 2024-03-06 RX ORDER — INSULIN LISPRO 100 [IU]/ML
0-8 INJECTION, SOLUTION INTRAVENOUS; SUBCUTANEOUS
Status: DISCONTINUED | OUTPATIENT
Start: 2024-03-06 | End: 2024-03-12 | Stop reason: HOSPADM

## 2024-03-06 RX ORDER — DEXTROSE MONOHYDRATE 100 MG/ML
INJECTION, SOLUTION INTRAVENOUS CONTINUOUS PRN
Status: DISCONTINUED | OUTPATIENT
Start: 2024-03-06 | End: 2024-03-12 | Stop reason: HOSPADM

## 2024-03-06 RX ORDER — ATORVASTATIN CALCIUM 10 MG/1
10 TABLET, FILM COATED ORAL DAILY
Status: DISCONTINUED | OUTPATIENT
Start: 2024-03-06 | End: 2024-03-12 | Stop reason: HOSPADM

## 2024-03-06 RX ORDER — SODIUM CHLORIDE 0.9 % (FLUSH) 0.9 %
5-40 SYRINGE (ML) INJECTION PRN
Status: DISCONTINUED | OUTPATIENT
Start: 2024-03-06 | End: 2024-03-12 | Stop reason: HOSPADM

## 2024-03-06 RX ORDER — 0.9 % SODIUM CHLORIDE 0.9 %
30 INTRAVENOUS SOLUTION INTRAVENOUS ONCE
Status: COMPLETED | OUTPATIENT
Start: 2024-03-06 | End: 2024-03-06

## 2024-03-06 RX ORDER — PRIMIDONE 50 MG/1
50 TABLET ORAL 2 TIMES DAILY
Status: DISCONTINUED | OUTPATIENT
Start: 2024-03-06 | End: 2024-03-12 | Stop reason: HOSPADM

## 2024-03-06 RX ORDER — 0.9 % SODIUM CHLORIDE 0.9 %
500 INTRAVENOUS SOLUTION INTRAVENOUS ONCE
Status: COMPLETED | OUTPATIENT
Start: 2024-03-06 | End: 2024-03-07

## 2024-03-06 RX ORDER — FENTANYL CITRATE 0.05 MG/ML
50 INJECTION, SOLUTION INTRAMUSCULAR; INTRAVENOUS ONCE
Status: COMPLETED | OUTPATIENT
Start: 2024-03-06 | End: 2024-03-06

## 2024-03-06 RX ORDER — ENOXAPARIN SODIUM 100 MG/ML
40 INJECTION SUBCUTANEOUS DAILY
Status: DISCONTINUED | OUTPATIENT
Start: 2024-03-06 | End: 2024-03-12 | Stop reason: HOSPADM

## 2024-03-06 RX ORDER — MIDODRINE HYDROCHLORIDE 2.5 MG/1
2.5 TABLET ORAL 3 TIMES DAILY PRN
Status: DISCONTINUED | OUTPATIENT
Start: 2024-03-06 | End: 2024-03-12 | Stop reason: HOSPADM

## 2024-03-06 RX ORDER — MAGNESIUM SULFATE HEPTAHYDRATE 40 MG/ML
2000 INJECTION, SOLUTION INTRAVENOUS PRN
Status: DISCONTINUED | OUTPATIENT
Start: 2024-03-06 | End: 2024-03-12 | Stop reason: HOSPADM

## 2024-03-06 RX ORDER — ACETAMINOPHEN 500 MG
1000 TABLET ORAL ONCE
Status: COMPLETED | OUTPATIENT
Start: 2024-03-06 | End: 2024-03-06

## 2024-03-06 RX ORDER — CLOPIDOGREL BISULFATE 75 MG/1
75 TABLET ORAL DAILY
Status: DISCONTINUED | OUTPATIENT
Start: 2024-03-06 | End: 2024-03-12 | Stop reason: HOSPADM

## 2024-03-06 RX ORDER — POLYETHYLENE GLYCOL 3350 17 G/17G
17 POWDER, FOR SOLUTION ORAL DAILY PRN
Status: DISCONTINUED | OUTPATIENT
Start: 2024-03-06 | End: 2024-03-12 | Stop reason: HOSPADM

## 2024-03-06 RX ORDER — ACETAMINOPHEN 650 MG/1
650 SUPPOSITORY RECTAL EVERY 6 HOURS PRN
Status: DISCONTINUED | OUTPATIENT
Start: 2024-03-06 | End: 2024-03-12 | Stop reason: HOSPADM

## 2024-03-06 RX ORDER — INSULIN GLARGINE 100 [IU]/ML
10 INJECTION, SOLUTION SUBCUTANEOUS NIGHTLY
Status: DISCONTINUED | OUTPATIENT
Start: 2024-03-06 | End: 2024-03-12 | Stop reason: HOSPADM

## 2024-03-06 RX ORDER — ROPINIROLE 1 MG/1
4 TABLET, FILM COATED ORAL NIGHTLY
Status: DISCONTINUED | OUTPATIENT
Start: 2024-03-06 | End: 2024-03-12 | Stop reason: HOSPADM

## 2024-03-06 RX ORDER — ONDANSETRON 4 MG/1
4 TABLET, ORALLY DISINTEGRATING ORAL EVERY 8 HOURS PRN
Status: DISCONTINUED | OUTPATIENT
Start: 2024-03-06 | End: 2024-03-12 | Stop reason: HOSPADM

## 2024-03-06 RX ORDER — PANTOPRAZOLE SODIUM 40 MG/1
40 TABLET, DELAYED RELEASE ORAL
Status: DISCONTINUED | OUTPATIENT
Start: 2024-03-07 | End: 2024-03-12 | Stop reason: HOSPADM

## 2024-03-06 RX ORDER — POTASSIUM CHLORIDE 7.45 MG/ML
10 INJECTION INTRAVENOUS PRN
Status: DISCONTINUED | OUTPATIENT
Start: 2024-03-06 | End: 2024-03-12 | Stop reason: HOSPADM

## 2024-03-06 RX ORDER — ONDANSETRON 2 MG/ML
4 INJECTION INTRAMUSCULAR; INTRAVENOUS EVERY 6 HOURS PRN
Status: DISCONTINUED | OUTPATIENT
Start: 2024-03-06 | End: 2024-03-12 | Stop reason: HOSPADM

## 2024-03-06 RX ORDER — POTASSIUM CHLORIDE 20 MEQ/1
40 TABLET, EXTENDED RELEASE ORAL PRN
Status: DISCONTINUED | OUTPATIENT
Start: 2024-03-06 | End: 2024-03-12 | Stop reason: HOSPADM

## 2024-03-06 RX ORDER — TIZANIDINE 2 MG/1
2 TABLET ORAL NIGHTLY PRN
Status: DISCONTINUED | OUTPATIENT
Start: 2024-03-06 | End: 2024-03-12 | Stop reason: HOSPADM

## 2024-03-06 RX ORDER — ACETAMINOPHEN 325 MG/1
650 TABLET ORAL EVERY 6 HOURS PRN
Status: DISCONTINUED | OUTPATIENT
Start: 2024-03-06 | End: 2024-03-12 | Stop reason: HOSPADM

## 2024-03-06 RX ORDER — SODIUM CHLORIDE 9 MG/ML
INJECTION, SOLUTION INTRAVENOUS PRN
Status: DISCONTINUED | OUTPATIENT
Start: 2024-03-06 | End: 2024-03-12 | Stop reason: HOSPADM

## 2024-03-06 RX ORDER — INSULIN LISPRO 100 [IU]/ML
0-4 INJECTION, SOLUTION INTRAVENOUS; SUBCUTANEOUS NIGHTLY
Status: DISCONTINUED | OUTPATIENT
Start: 2024-03-06 | End: 2024-03-12 | Stop reason: HOSPADM

## 2024-03-06 RX ORDER — RANOLAZINE 500 MG/1
500 TABLET, EXTENDED RELEASE ORAL 2 TIMES DAILY
Status: DISCONTINUED | OUTPATIENT
Start: 2024-03-06 | End: 2024-03-12 | Stop reason: HOSPADM

## 2024-03-06 RX ORDER — SODIUM CHLORIDE 0.9 % (FLUSH) 0.9 %
5-40 SYRINGE (ML) INJECTION EVERY 12 HOURS SCHEDULED
Status: DISCONTINUED | OUTPATIENT
Start: 2024-03-06 | End: 2024-03-12 | Stop reason: HOSPADM

## 2024-03-06 RX ORDER — SODIUM CHLORIDE, SODIUM LACTATE, POTASSIUM CHLORIDE, CALCIUM CHLORIDE 600; 310; 30; 20 MG/100ML; MG/100ML; MG/100ML; MG/100ML
INJECTION, SOLUTION INTRAVENOUS CONTINUOUS
Status: ACTIVE | OUTPATIENT
Start: 2024-03-06 | End: 2024-03-08

## 2024-03-06 RX ORDER — HYDROCODONE BITARTRATE AND ACETAMINOPHEN 7.5; 325 MG/1; MG/1
1 TABLET ORAL EVERY 6 HOURS PRN
Status: DISCONTINUED | OUTPATIENT
Start: 2024-03-06 | End: 2024-03-12 | Stop reason: HOSPADM

## 2024-03-06 RX ORDER — SODIUM CHLORIDE 9 MG/ML
INJECTION, SOLUTION INTRAVENOUS CONTINUOUS
Status: DISCONTINUED | OUTPATIENT
Start: 2024-03-06 | End: 2024-03-06

## 2024-03-06 RX ADMIN — HYDROCODONE BITARTRATE AND ACETAMINOPHEN 1 TABLET: 7.5; 325 TABLET ORAL at 20:15

## 2024-03-06 RX ADMIN — SODIUM CHLORIDE 500 ML: 9 INJECTION, SOLUTION INTRAVENOUS at 22:27

## 2024-03-06 RX ADMIN — INSULIN GLARGINE 10 UNITS: 100 INJECTION, SOLUTION SUBCUTANEOUS at 22:30

## 2024-03-06 RX ADMIN — ENOXAPARIN SODIUM 40 MG: 100 INJECTION SUBCUTANEOUS at 22:30

## 2024-03-06 RX ADMIN — ACETAMINOPHEN 1000 MG: 500 TABLET ORAL at 16:33

## 2024-03-06 RX ADMIN — VANCOMYCIN HYDROCHLORIDE 1750 MG: 1 INJECTION, POWDER, LYOPHILIZED, FOR SOLUTION INTRAVENOUS at 17:39

## 2024-03-06 RX ADMIN — SODIUM CHLORIDE, POTASSIUM CHLORIDE, SODIUM LACTATE AND CALCIUM CHLORIDE: 600; 310; 30; 20 INJECTION, SOLUTION INTRAVENOUS at 21:20

## 2024-03-06 RX ADMIN — ROPINIROLE HYDROCHLORIDE 4 MG: 1 TABLET, FILM COATED ORAL at 22:29

## 2024-03-06 RX ADMIN — PRIMIDONE 50 MG: 50 TABLET ORAL at 22:29

## 2024-03-06 RX ADMIN — MIDODRINE HYDROCHLORIDE 2.5 MG: 2.5 TABLET ORAL at 22:29

## 2024-03-06 RX ADMIN — FENTANYL CITRATE 50 MCG: 0.05 INJECTION, SOLUTION INTRAMUSCULAR; INTRAVENOUS at 17:07

## 2024-03-06 RX ADMIN — CEFEPIME 2000 MG: 2 INJECTION, POWDER, FOR SOLUTION INTRAVENOUS at 16:49

## 2024-03-06 RX ADMIN — SODIUM CHLORIDE 2190 ML: 9 INJECTION, SOLUTION INTRAVENOUS at 16:25

## 2024-03-06 RX ADMIN — MEROPENEM 1000 MG: 1 INJECTION, POWDER, FOR SOLUTION INTRAVENOUS at 22:29

## 2024-03-06 RX ADMIN — RANOLAZINE 500 MG: 500 TABLET, EXTENDED RELEASE ORAL at 22:29

## 2024-03-06 ASSESSMENT — PAIN DESCRIPTION - ORIENTATION
ORIENTATION: LEFT
ORIENTATION: LEFT

## 2024-03-06 ASSESSMENT — PAIN SCALES - GENERAL
PAINLEVEL_OUTOF10: 5
PAINLEVEL_OUTOF10: 7
PAINLEVEL_OUTOF10: 10
PAINLEVEL_OUTOF10: 7

## 2024-03-06 ASSESSMENT — PAIN DESCRIPTION - DESCRIPTORS: DESCRIPTORS: THROBBING

## 2024-03-06 ASSESSMENT — PAIN DESCRIPTION - PAIN TYPE: TYPE: SURGICAL PAIN

## 2024-03-06 ASSESSMENT — PAIN - FUNCTIONAL ASSESSMENT: PAIN_FUNCTIONAL_ASSESSMENT: 0-10

## 2024-03-06 ASSESSMENT — PAIN DESCRIPTION - LOCATION
LOCATION: KNEE
LOCATION: KNEE

## 2024-03-06 NOTE — H&P
[facility]   IN-PATIENT SERVICE   [facility]    HISTORY AND PHYSICAL EXAMINATION            Date:   3/6/2024  Patient name:  Komal Crawford  Date of admission:  3/6/2024  3:17 PM  MRN:   [id number]  Account:  [id number]  YOB: 1940  PCP:    Sabiha Bedolla MD  Room:   [facility]  Code Status:    Full Code    Chief Complaint:     Chief Complaint   Patient presents with    Wound Infection     Left knee redness and warmth, total knee done in January at [facility]       History Obtained From:     patient    History of Present Illness:     The patient is a 83 y.o.  Non- / non  female who presents with Wound Infection (Left knee redness and warmth, total knee done in January at [facility])   and she is admitted to the hospital for the management of left lower extremity cellulitis.    83-year-old female with past medical history significant for diabetes mellitus type 2, TIA, GERD, Winters's esophagus, chronic ITP presents to the hospital with concerns for left lower extremity edema and swelling that started 1-1/2 weeks back.  The patient reports that she underwent left ISHAN 1/20/2024, left total knee arthroplasty in January.  She was doing well postoperatively however for the past 1-1/2 weeks she has noted an opening in the anterior aspect of the left knee, with serosanguineous discharge, no reported puslike drainage, reports worsening erythema and swelling for the past 1-1/2-week.      Hematuria  Has neurogenic bladder, has had a Winters since the past 3 months now.  Reports hematuria.  Winters exchanged in the ER.    Otherwise denies GI concerns, cardiac or respiratory concerns        Past Medical History:     Past Medical History:   Diagnosis Date    Age-related cognitive decline     Ankle pain     Left ankle fracture in ortho boat.    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HCC) 1990    left with urinary incontinenc post surgical  Provider   insulin lispro (HUMALOG) 100 UNIT/ML SOLN injection vial Inject 0-8 Units into the skin 3 times daily (with meals) 2/11/24   Sakshi Johnson MD   insulin lispro (HUMALOG) 100 UNIT/ML SOLN injection vial Inject 0-4 Units into the skin nightly 2/11/24   Sakshi Johnson MD   diclofenac sodium (VOLTAREN) 1 % GEL Apply 4 g topically 2 times daily 2/11/24   Sakshi Johnson MD   senna (SENOKOT) 8.6 MG tablet Take 2 tablets by mouth nightly as needed (constipation) 2/11/24 3/12/24  Sakshi Johnson MD   nitrofurantoin, macrocrystal-monohydrate, (MACROBID) 100 MG capsule Take 1 capsule by mouth every 12 hours Chronic suppression/prevention 2/11/24   Sakshi Johnson MD   midodrine (PROAMATINE) 2.5 MG tablet Take 1 tablet by mouth 3 times daily as needed (sbp <100) 2/11/24   Sakshi Johnson MD   dicyclomine (BENTYL) 10 MG capsule Take 1 capsule by mouth 3 times daily as needed (For bladder spasm) 2/11/24   Sakshi Johnson MD   nystatin (MYCOSTATIN) 245069 UNIT/GM powder Apply 3 times daily. 2/8/24   Sabiha Bedolla MD   nystatin-triamcinolone (MYCOLOG II) 187383-9.1 UNIT/GM-% cream Apply topically 4 times daily Apply topically 4 times daily. 2/8/24   Sabiha Bedolla MD   rivaroxaban (XARELTO) 10 MG TABS tablet Take 1 tablet by mouth daily 1/25/24 2/24/24  Jon Bonner MD   tiZANidine (ZANAFLEX) 2 MG tablet Take 1 tablet by mouth nightly as needed (for pain) 1/10/24   Benja Cedillo PA   ranolazine (RANEXA) 500 MG extended release tablet Take 1 tablet by mouth 2 times daily 1/9/24   Sakshi Johnson MD   bisacodyl (DULCOLAX) 5 MG EC tablet Take 1 tablet by mouth 2 times daily as needed for Constipation 1/9/24   Sakshi Johnson MD   polyethylene glycol (GLYCOLAX) 17 g packet Take 1 packet by mouth daily as needed for Constipation 1/9/24   Sakshi Johnson MD   insulin glargine (LANTUS SOLOSTAR) 100 UNIT/ML injection pen Inject 10 Units into the skin nightly 1/9/24   Sakshi Johnson  Leukocyte Esterase, Urine MOD (A) NEGATIVE   Microscopic Urinalysis    Collection Time: 03/06/24  5:00 PM   Result Value Ref Range    WBC, UA TOO NUMEROUS TO COUNT (A) 0 TO 5 /HPF    RBC, UA TOO NUMEROUS TO COUNT (A) 0 TO 2 /HPF    Epithelial Cells UA 0 TO 2 /HPF    Bacteria, UA FEW (A) None   Lactate, Sepsis    Collection Time: 03/06/24  5:50 PM   Result Value Ref Range    Lactic Acid, Sepsis 1.7 0.5 - 1.9 mmol/L   BUN & Creatinine    Collection Time: 03/06/24  5:50 PM   Result Value Ref Range    BUN 14 8 - 23 mg/dL    Creatinine 0.7 0.5 - 0.9 mg/dL    Est, Glom Filt Rate >60 >60 mL/min/1.73m2       Imaging/Diagnostics:    Reviewed    Assessment :      Primary Problem  Sepsis (HCC)    Active Hospital Problems    Diagnosis Date Noted    Sepsis (HCC) [A41.9] 03/06/2024       Plan:     Patient status Admit as inpatient in the  Progressive Unit/Step down    Left lower extremity cellulitis, around the left knee.  Extending to the middle of the thigh.  Will place on vancomycin, cefepime.  Nursing communication to rosy area.  Blood culture sent.  Consult ID and orthopedics.  No abscess identified on CT scan of the left lower extremity  Open gap in the anterior aspect of the left knee/wound dehiscence, oozing serous fluid.  Send cultures  CAUTI--present on admission, Winters exchange 3/6/2024.  Will consult urology given the reported hematuria.  Bladder scans every 6 hours  Sepsis, fever, tachycardia, leukocytosis secondary to above.  Follow lactic acid  Neurogenic bladder, has had a Winters for the past 3 months now as per patient  Diabetes mellitus type II, placed on sliding scale.  No recent HbA1c, will obtain  Chronic normocytic normochromic anemia  History of CVA  S/p total left hip, left distal femur, replacement in January.  Also had right knee replacement in November.    DVT prophylaxis, will hold off for now given the patient has hematuria and serosanguineous oozing from the opening in the left knee    Consultations:

## 2024-03-06 NOTE — ED PROVIDER NOTES
EMERGENCY DEPARTMENT ENCOUNTER    Pt Name: Komal Crawford  MRN: [id number]  Birthdate 1940  Date of evaluation: 3/6/24  CHIEF COMPLAINT       Chief Complaint   Patient presents with    Wound Infection     Left knee redness and warmth, total knee done in January at [facility]     HISTORY OF PRESENT ILLNESS   Presenting for wound check of her left leg.  Patient had a total knee arthroplasty and a total hip arthroplasty performed in January 2024.  She has noticed increased warmth and tenderness along the incision site above the left knee.  She has induration to that area and swelling.  Patient presented febrile.  She is complaining of pain from her left hip to her left knee.    The history is provided by the patient.         REVIEW OF SYSTEMS     Review of Systems   Constitutional:  Positive for fever. Negative for chills.   Eyes:  Negative for visual disturbance.   Respiratory:  Negative for shortness of breath.    Gastrointestinal:  Negative for nausea and vomiting.   Genitourinary:  Negative for flank pain.   Musculoskeletal:  Positive for arthralgias.   Neurological:  Negative for dizziness, light-headedness and headaches.     PASTMEDICAL HISTORY     Past Medical History:   Diagnosis Date    Age-related cognitive decline     Ankle pain     Left ankle fracture in ortho boat.    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HCC) 1990    left with urinary incontinenc post surgical    Caffeine use     2 coffee/day, 1-2 tea per week    Cerebral artery occlusion with cerebral infarction (HCC)     Chronic back pain     Chronic ITP (idiopathic thrombocytopenia) (HCC)     CVA (cerebral infarction) 2008, 2010    balance issues, short term memory loss    Diabetic gastroparesis (HCC)     Diverticular disease 1986    GERD (gastroesophageal reflux disease)     Hiatal hernia     Hip fracture (HCC)     History of blood transfusion     History of decreased platelet count     Hyperlipemia      her mother is . She indicated that her father is . She indicated that both of her sisters are . She indicated that her brother is . She indicated that her maternal grandmother is .     SOCIAL HISTORY       Social History     Tobacco Use    Smoking status: Former     Current packs/day: 0.00     Average packs/day: 0.5 packs/day for 30.0 years (15.0 ttl pk-yrs)     Types: Cigarettes     Start date: 1952     Quit date: 1982     Years since quittin.7    Smokeless tobacco: Former     Quit date: 1982   Vaping Use    Vaping Use: Never used   Substance Use Topics    Alcohol use: No    Drug use: No     PHYSICAL EXAM     INITIAL VITALS: /67   Pulse 90   Temp 99.9 °F (37.7 °C)   Resp 16   Ht 1.473 m (4' 10\")   Wt 73 kg (161 lb)   SpO2 93%   BMI 33.65 kg/m²    Physical Exam  Vitals and nursing note reviewed.   Constitutional:       General: She is not in acute distress.     Appearance: She is not ill-appearing.   HENT:      Head: Normocephalic.      Right Ear: External ear normal.      Left Ear: External ear normal.      Mouth/Throat:      Mouth: Mucous membranes are moist.   Eyes:      Extraocular Movements: Extraocular movements intact.   Cardiovascular:      Rate and Rhythm: Normal rate and regular rhythm.      Pulses:           Radial pulses are 2+ on the right side and 2+ on the left side.        Dorsalis pedis pulses are 2+ on the right side and 2+ on the left side.        Posterior tibial pulses are 2+ on the right side and 2+ on the left side.      Heart sounds: Normal heart sounds.   Pulmonary:      Effort: Pulmonary effort is normal. No respiratory distress.   Abdominal:      General: There is no distension.      Palpations: Abdomen is soft.      Tenderness: There is abdominal tenderness in the suprapubic area.   Musculoskeletal:      Left knee: Erythema present. Tenderness present.        Legs:       Comments: Induration, erythema, edema   Skin:

## 2024-03-06 NOTE — PROGRESS NOTES
Vancomycin Dosing by Pharmacy - Initial Note   TODAY'S DATE:  3/6/2024  Patient name, age:  Komal Crawford, 83 y.o.    Indication: SSTI, MRSA suspected.  Additional antimicrobials:  cefepime     Allergies:  Iodides, Iodinated contrast media, Povidone-iodine, Sulfa antibiotics, Betadine [povidone iodine], Cephalexin, Cephalexin, Cozaar [losartan], Cymbalta [duloxetine hcl], Demerol, Doxycycline, Duloxetine, Meperidine, Morphine, Penicillins, Zithromax [azithromycin], Ciprofloxacin, Clindamycin/lincomycin, Etodolac, Fesoterodine, Fluorescein, Iodine, Lisinopril, Penicillin g, Toviaz [fesoterodine fumarate er], Clonazepam, and Metformin and related   Actual Weight:    Wt Readings from Last 1 Encounters:   03/06/24 73 kg (161 lb)     Labs/Ancillary Data  Estimated Creatinine Clearance: 56 mL/min (based on SCr of 0.7 mg/dL).  Recent Labs     03/05/24  0540 03/06/24  1604   CREATININE  --  0.7   BUN  --  14   WBC 6.3 15.1*     Procalcitonin   Date Value Ref Range Status   03/06/2024 0.13 (H) <0.09 ng/mL Final     Comment:           Suspected Sepsis:  <0.50 ng/mL     Low likelihood of sepsis.  0.50-2.00 ng/mL     Increased likelihood of sepsis. Antibiotics encouraged.  >2.00 ng/mL     High risk of sepsis/shock. Antibiotics strongly encouraged.        Suspected Lower Resp Tract Infections:  <0.24 ng/mL     Low likelihood of bacterial infection.  >0.24 ng/mL     Increased likelihood of bacterial infection. Antibiotics encouraged.        With successful antibiotic therapy, PCT levels should decrease rapidly. (Half-life of 24 to   36 hours.)        Procalcitonin values from samples collected within the first 6 hours of systemic infection   may still be low. Retesting may be indicated.  Values from day 1 and day 4 can be entered into the Change in Procalcitonin Calculator   ([email or web address]) to determine the patient's Mortality Risk Prognosis        In healthy neonates, plasma Procalcitonin (PCT) concentrations

## 2024-03-07 ENCOUNTER — APPOINTMENT (OUTPATIENT)
Dept: ULTRASOUND IMAGING | Age: 84
DRG: 981 | End: 2024-03-07
Payer: MEDICARE

## 2024-03-07 ENCOUNTER — APPOINTMENT (OUTPATIENT)
Dept: VASCULAR LAB | Age: 84
DRG: 981 | End: 2024-03-07
Attending: INTERNAL MEDICINE
Payer: MEDICARE

## 2024-03-07 ENCOUNTER — ANESTHESIA EVENT (OUTPATIENT)
Dept: OPERATING ROOM | Age: 84
End: 2024-03-07
Payer: MEDICARE

## 2024-03-07 PROBLEM — L03.116 CELLULITIS OF LEFT LOWER EXTREMITY: Status: ACTIVE | Noted: 2024-03-07

## 2024-03-07 PROBLEM — A41.9 SEVERE SEPSIS (HCC): Status: ACTIVE | Noted: 2024-03-07

## 2024-03-07 PROBLEM — R65.20 SEVERE SEPSIS (HCC): Status: ACTIVE | Noted: 2024-03-07

## 2024-03-07 LAB
ABSOLUTE BANDS: 0.2 K/UL (ref 0–1)
ALBUMIN SERPL-MCNC: 2.8 G/DL (ref 3.5–5.2)
ALP SERPL-CCNC: 73 U/L (ref 35–104)
ALT SERPL-CCNC: 6 U/L (ref 5–33)
ANION GAP SERPL CALCULATED.3IONS-SCNC: 12 MMOL/L (ref 9–17)
AST SERPL-CCNC: 16 U/L
BANDS: 1 % (ref 0–10)
BASOPHILS # BLD: 0 K/UL (ref 0–0.2)
BASOPHILS NFR BLD: 0 % (ref 0–2)
BILIRUB SERPL-MCNC: 0.6 MG/DL (ref 0.3–1.2)
BUN SERPL-MCNC: 14 MG/DL (ref 8–23)
CALCIUM SERPL-MCNC: 7.9 MG/DL (ref 8.6–10.4)
CHLORIDE SERPL-SCNC: 102 MMOL/L (ref 98–107)
CO2 SERPL-SCNC: 20 MMOL/L (ref 20–31)
CREAT SERPL-MCNC: 0.7 MG/DL (ref 0.5–0.9)
EOSINOPHIL # BLD: 0 K/UL (ref 0–0.4)
EOSINOPHILS RELATIVE PERCENT: 0 % (ref 0–4)
ERYTHROCYTE [DISTWIDTH] IN BLOOD BY AUTOMATED COUNT: 18.6 % (ref 11.5–14.9)
GFR SERPL CREATININE-BSD FRML MDRD: >60 ML/MIN/1.73M2
GLUCOSE BLD-MCNC: 107 MG/DL (ref 65–105)
GLUCOSE BLD-MCNC: 115 MG/DL (ref 65–105)
GLUCOSE BLD-MCNC: 167 MG/DL (ref 65–105)
GLUCOSE BLD-MCNC: 81 MG/DL (ref 65–105)
GLUCOSE SERPL-MCNC: 121 MG/DL (ref 70–99)
HCT VFR BLD AUTO: 30.5 % (ref 36–46)
HGB BLD-MCNC: 9.7 G/DL (ref 12–16)
LACTATE BLDV-SCNC: 1.7 MMOL/L (ref 0.5–2.2)
LYMPHOCYTES NFR BLD: 0.2 K/UL (ref 1–4.8)
LYMPHOCYTES RELATIVE PERCENT: 1 % (ref 24–44)
MCH RBC QN AUTO: 29.7 PG (ref 26–34)
MCHC RBC AUTO-ENTMCNC: 31.9 G/DL (ref 31–37)
MCV RBC AUTO: 93 FL (ref 80–100)
MICROORGANISM SPEC CULT: ABNORMAL
MONOCYTES NFR BLD: 0.79 K/UL (ref 0.1–1.3)
MONOCYTES NFR BLD: 4 % (ref 1–7)
MORPHOLOGY: ABNORMAL
MORPHOLOGY: ABNORMAL
NEUTROPHILS NFR BLD: 94 % (ref 36–66)
NEUTS SEG NFR BLD: 18.61 K/UL (ref 1.3–9.1)
PLATELET # BLD AUTO: 132 K/UL (ref 150–450)
PMV BLD AUTO: 12 FL (ref 6–12)
POTASSIUM SERPL-SCNC: 3.9 MMOL/L (ref 3.7–5.3)
PROT SERPL-MCNC: 5.3 G/DL (ref 6.4–8.3)
RBC # BLD AUTO: 3.28 M/UL (ref 4–5.2)
SODIUM SERPL-SCNC: 134 MMOL/L (ref 135–144)
SPECIMEN DESCRIPTION: ABNORMAL
WBC OTHER # BLD: 19.8 K/UL (ref 3.5–11)

## 2024-03-07 PROCEDURE — 99223 1ST HOSP IP/OBS HIGH 75: CPT | Performed by: INTERNAL MEDICINE

## 2024-03-07 PROCEDURE — 85025 COMPLETE CBC W/AUTO DIFF WBC: CPT

## 2024-03-07 PROCEDURE — 36415 COLL VENOUS BLD VENIPUNCTURE: CPT

## 2024-03-07 PROCEDURE — 99221 1ST HOSP IP/OBS SF/LOW 40: CPT | Performed by: ORTHOPAEDIC SURGERY

## 2024-03-07 PROCEDURE — 82947 ASSAY GLUCOSE BLOOD QUANT: CPT

## 2024-03-07 PROCEDURE — 6370000000 HC RX 637 (ALT 250 FOR IP): Performed by: INTERNAL MEDICINE

## 2024-03-07 PROCEDURE — 87070 CULTURE OTHR SPECIMN AEROBIC: CPT

## 2024-03-07 PROCEDURE — 2580000003 HC RX 258: Performed by: EMERGENCY MEDICINE

## 2024-03-07 PROCEDURE — 87186 SC STD MICRODIL/AGAR DIL: CPT

## 2024-03-07 PROCEDURE — 6360000002 HC RX W HCPCS: Performed by: EMERGENCY MEDICINE

## 2024-03-07 PROCEDURE — 51798 US URINE CAPACITY MEASURE: CPT

## 2024-03-07 PROCEDURE — 83605 ASSAY OF LACTIC ACID: CPT

## 2024-03-07 PROCEDURE — 86403 PARTICLE AGGLUT ANTBDY SCRN: CPT

## 2024-03-07 PROCEDURE — 76775 US EXAM ABDO BACK WALL LIM: CPT

## 2024-03-07 PROCEDURE — 87205 SMEAR GRAM STAIN: CPT

## 2024-03-07 PROCEDURE — 2580000003 HC RX 258: Performed by: INTERNAL MEDICINE

## 2024-03-07 PROCEDURE — 87075 CULTR BACTERIA EXCEPT BLOOD: CPT

## 2024-03-07 PROCEDURE — 99212 OFFICE O/P EST SF 10 MIN: CPT

## 2024-03-07 PROCEDURE — 2060000000 HC ICU INTERMEDIATE R&B

## 2024-03-07 PROCEDURE — 99233 SBSQ HOSP IP/OBS HIGH 50: CPT | Performed by: INTERNAL MEDICINE

## 2024-03-07 PROCEDURE — 80053 COMPREHEN METABOLIC PANEL: CPT

## 2024-03-07 PROCEDURE — 6360000002 HC RX W HCPCS: Performed by: INTERNAL MEDICINE

## 2024-03-07 PROCEDURE — 93970 EXTREMITY STUDY: CPT

## 2024-03-07 RX ADMIN — HYDROCODONE BITARTRATE AND ACETAMINOPHEN 1 TABLET: 7.5; 325 TABLET ORAL at 18:18

## 2024-03-07 RX ADMIN — ATORVASTATIN CALCIUM 10 MG: 10 TABLET, FILM COATED ORAL at 07:59

## 2024-03-07 RX ADMIN — SODIUM CHLORIDE, POTASSIUM CHLORIDE, SODIUM LACTATE AND CALCIUM CHLORIDE: 600; 310; 30; 20 INJECTION, SOLUTION INTRAVENOUS at 12:44

## 2024-03-07 RX ADMIN — PANTOPRAZOLE SODIUM 40 MG: 40 TABLET, DELAYED RELEASE ORAL at 06:44

## 2024-03-07 RX ADMIN — CLOPIDOGREL BISULFATE 75 MG: 75 TABLET ORAL at 07:59

## 2024-03-07 RX ADMIN — POLYETHYLENE GLYCOL 3350 17 G: 17 POWDER, FOR SOLUTION ORAL at 07:59

## 2024-03-07 RX ADMIN — HYDROCODONE BITARTRATE AND ACETAMINOPHEN 1 TABLET: 7.5; 325 TABLET ORAL at 08:06

## 2024-03-07 RX ADMIN — RANOLAZINE 500 MG: 500 TABLET, EXTENDED RELEASE ORAL at 07:59

## 2024-03-07 RX ADMIN — ROPINIROLE HYDROCHLORIDE 4 MG: 1 TABLET, FILM COATED ORAL at 21:53

## 2024-03-07 RX ADMIN — SODIUM CHLORIDE, PRESERVATIVE FREE 10 ML: 5 INJECTION INTRAVENOUS at 08:07

## 2024-03-07 RX ADMIN — MEROPENEM 1000 MG: 1 INJECTION, POWDER, FOR SOLUTION INTRAVENOUS at 15:01

## 2024-03-07 RX ADMIN — INSULIN GLARGINE 10 UNITS: 100 INJECTION, SOLUTION SUBCUTANEOUS at 21:53

## 2024-03-07 RX ADMIN — VANCOMYCIN HYDROCHLORIDE 1250 MG: 1.25 INJECTION, POWDER, LYOPHILIZED, FOR SOLUTION INTRAVENOUS at 18:14

## 2024-03-07 RX ADMIN — PRIMIDONE 50 MG: 50 TABLET ORAL at 21:53

## 2024-03-07 RX ADMIN — MEROPENEM 1000 MG: 1 INJECTION, POWDER, FOR SOLUTION INTRAVENOUS at 06:47

## 2024-03-07 RX ADMIN — RANOLAZINE 500 MG: 500 TABLET, EXTENDED RELEASE ORAL at 21:53

## 2024-03-07 RX ADMIN — ENOXAPARIN SODIUM 40 MG: 100 INJECTION SUBCUTANEOUS at 08:06

## 2024-03-07 RX ADMIN — PRIMIDONE 50 MG: 50 TABLET ORAL at 07:59

## 2024-03-07 RX ADMIN — MEROPENEM 1000 MG: 1 INJECTION, POWDER, FOR SOLUTION INTRAVENOUS at 22:15

## 2024-03-07 ASSESSMENT — PAIN DESCRIPTION - LOCATION
LOCATION: LEG
LOCATION: KNEE

## 2024-03-07 ASSESSMENT — PAIN SCALES - GENERAL
PAINLEVEL_OUTOF10: 7
PAINLEVEL_OUTOF10: 7

## 2024-03-07 ASSESSMENT — PAIN DESCRIPTION - DESCRIPTORS: DESCRIPTORS: BURNING

## 2024-03-07 ASSESSMENT — PAIN DESCRIPTION - ORIENTATION
ORIENTATION: LEFT
ORIENTATION: LEFT

## 2024-03-07 NOTE — CONSULTS
Department of Urology  Urology Consult Note    Patient:  Komal Crawford  MRN: [id number]  YOB: 1940    Reason for Consult:  Cauti, hematuria  Requesting Physician:  Everett Shea MD     CHIEF COMPLAINT:    Chief Complaint   Patient presents with    Wound Infection     Left knee redness and warmth, total knee done in January at [place]       History Obtained From:   patient, electronic medical record    HISTORY OF PRESENT ILLNESS:    The patient is a 83 y.o. female who presents to the hospital with wound infection on left knee. She is admitted for the management of LE cellulitis.  Urology is consulted for CAUTI and hematuria. She has a hx of NGB and recurrent UTIs and she follows with Dr. Lawson as outpatient. It does not appear that she has followed up with him since 12/2022.  Patient states she has had an indwelling reeves catheter since her surgery in January.  She has not followed up with her regular urologist, and states she has not been able to get ahold of their office.  She was in a rehab facility, but states nursing never changed it out.  Cath changed in ER. She has had blood in her urine for at least several weeks.  She denies dysuria, flank pain, or fevers. Her urine culture is pending. Leukocytosis noted, 19.8 with low grade fever of 99. Creatinine is normal.  She is on meropenem. She has not had any upper tract imaging recently. She tells me she does not want to go back to the facility at discharge.    Past Medical History:        Diagnosis Date    Age-related cognitive decline     Ankle pain     Left ankle fracture in ortho boat.    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HCC) 1990    left with urinary incontinenc post surgical    Caffeine use     2 coffee/day, 1-2 tea per week    Cerebral artery occlusion with cerebral infarction (HCC)     Chronic back pain     Chronic ITP (idiopathic thrombocytopenia) (HCC)     CVA (cerebral infarction)  the mA/kV was utilized to reduce the radiation dose to as low as reasonably achievable. COMPARISON: None. HISTORY ORDERING SYSTEM PROVIDED HISTORY: left leg swelling and warmth TECHNOLOGIST PROVIDED HISTORY: left leg swelling and warmth Decision Support Exception - unselect if not a suspected or confirmed emergency medical condition->Emergency Medical Condition (MA) Reason for Exam: left leg swelling and warmth from hip to knee left side. MAR was used during scan. FINDINGS: Bones: No fracture or dislocation identified.  No osseous erosion or periosteal reaction. Soft Tissue: A Winters catheter is in place.  The visualized soft tissue contents of the pelvis are otherwise grossly unremarkable.  Extensive subcutaneous edema throughout the left thigh and proximal aspect of the lower leg.  No drainable fluid collection or soft tissue gas.  The visualized musculature is unremarkable.  Atherosclerotic vascular calcifications are seen.  No inguinal lymphadenopathy. Joint: Status post left total hip arthroplasty and left total knee arthroplasty with long stem femoral and tibial components.  The hardware appears to be appropriately positioned and intact without abnormal interface widening identified.  No definite joint effusion.  The pubic symphysis is intact.     1. Extensive subcutaneous edema throughout the left thigh and proximal aspect of the left lower leg compatible with lymphedema or cellulitis.  No abscess or soft tissue gas. 2. No acute osseous abnormality.     XR CHEST PORTABLE    Result Date: 3/6/2024  EXAMINATION: ONE XRAY VIEW OF THE CHEST 3/6/2024 4:03 pm COMPARISON: 01/02/2024 HISTORY: ORDERING SYSTEM PROVIDED HISTORY: Sepsis TECHNOLOGIST PROVIDED HISTORY: Sepsis Reason for Exam: sob, leg pain/infection FINDINGS: The lungs appear clear.  There is mild cardiomegaly.  There is tortuosity of the thoracic aorta.  Bony structures are unremarkable.     No acute cardiopulmonary process. Mild cardiomegaly.        Impression:    Patient Active Problem List   Diagnosis    TIA (transient ischemic attack)    Chronic back pain    Diabetic peripheral neuropathy ([facility])    Macular degeneration of left eye    Constipation    Thrombocytopenia ([facility])    B12 deficiency    Vitamin D deficiency    Anemia    DDD (degenerative disc disease), cervical    Age-related cognitive decline    Acquired cyst of kidney    Microscopic hematuria    Bilateral renal cysts    Essential hypertension    Gastroesophageal reflux disease without esophagitis    Mixed incontinence    Recurrent UTI    Pure hypercholesterolemia    Hiatal hernia    Diabetic gastroparesis ([facility])    Internal hemorrhoid    Tubular adenoma    Colon polyps    Urge incontinence    S/P lumbar fusion    Chronic ITP (idiopathic thrombocytopenia) ([facility])    Urinary retention    Unsteadiness    Anxiety    Arthritis    Nausea    Diarrhea    Extrarenal pelvis    Primary osteoarthritis of left knee    Type 2 diabetes mellitus with diabetic peripheral angiopathy without gangrene, without long-term current use of insulin ([facility])    Memory loss    Urethral pain    Bladder spasms    Atrophic vaginitis    Coronary artery disease involving native coronary artery of native heart without angina pectoris    Chronic fatigue    Essential tremor    Neuropathy    Coronary angioplasty status    Recurrent major depressive disorder, in remission ([facility])    Inability to walk    Presence of drug-eluting stent in right coronary artery    Arthritis of right hip    History of total hip arthroplasty, right    Pressure injury of right buttock, stage 2 ([facility])    Tinea corporis    Debility    Left hip pain    Age related osteoporosis    Hip pain, acute, left    Arthritis of left hip    Closed displaced fracture of left femoral neck ([facility])    History of total knee arthroplasty, left    Instability of left knee joint    Closed displaced fracture of right femoral neck ([facility])    Displaced supracondylar fracture without

## 2024-03-07 NOTE — CONSULTS
[facility]  Consult Note       NAME:  Komal Crawford  MEDICAL RECORD NUMBER:  [id number]  AGE: 83 y.o.   GENDER: female  : 1940  TODAY'S DATE:  3/7/2024    Subjective:      Komal Crawford is a 83 y.o. female with inpatient referral to [facility] for:  Left knee wound      Wound Identification:  Wound Type: non-healing surgical  Contributing Factors: edema and diabetes    Wound History: Patient had left total hip arthroplasty on  and left total knee arthroplasty revision on .  A few days ago patient noted some drainage from the incision and increased warmth, redness, and swelling  Current Wound Care Treatment: Dry dressing    Patient Goal of Care:  [x] Wound Healing  [] Odor Control  [] Palliative Care  [] Pain Control   [] Other:         PAST MEDICAL HISTORY        Diagnosis Date    Age-related cognitive decline     Ankle pain     Left ankle fracture in ortho boat.    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    Caffeine use     2 coffee/day, 1-2 tea per week    Cerebral artery occlusion with cerebral infarction (HCC)     Chronic back pain     Chronic ITP (idiopathic thrombocytopenia) (HCC)     CVA (cerebral infarction) ,     balance issues, short term memory loss    Diabetic gastroparesis (HCC)     Diverticular disease     GERD (gastroesophageal reflux disease)     Hiatal hernia     Hip fracture (HCC)     History of blood transfusion     History of decreased platelet count     Hyperlipemia     Hypertension     Internal hemorrhoid     Macular degeneration of left eye     S/P laser treatment    Nausea and vomiting     Neuropathy     Osteoarthritis     Ringing in ears     Self-catheterizes urinary bladder     6-7 times daily    TIA (transient ischemic attack) 2000    x1    Tubular adenoma     colon polyps    Type II or unspecified type diabetes  daily 1/9/24   Sakshi Johnson MD   atorvastatin (LIPITOR) 10 MG tablet Take 1 tablet by mouth 4/1/23   Ana Carrington MD   primidone (MYSOLINE) 50 MG tablet Take 1 tablet by mouth 2 times daily 10/26/23 7/22/24  Sabiha Bedolla MD   glipiZIDE (GLUCOTROL) 5 MG tablet Takes 1.5 tabs in AM and 0.5 tab in PM 8/9/23   Ana Carrington MD   esomeprazole (NEXIUM) 40 MG delayed release capsule Take 1 capsule by mouth every morning (before breakfast) 4/17/23   Sabiha Bedolla MD   rOPINIRole (REQUIP) 4 MG tablet Take 1 tablet by mouth nightly 1/23/23   Sabiha Bedolla MD   clopidogrel (PLAVIX) 75 MG tablet Take 1 tablet by mouth daily 8/5/22   Ana Carrington MD   acetaminophen (TYLENOL) 500 MG tablet Take 2 tablets by mouth every 6 hours as needed for Pain 3/20/22   Delta Teixeira MD   Continuous Blood Gluc Sensor (FREESTYLE DAVIS 14 DAY SENSOR) [facility]  2/17/20   Ana Carrington MD   Continuous Blood Gluc  (FREESTYLE DAVIS 14 DAY READER) [facility]  12/9/19   Ana Carrington MD   CRANBERRY PO Take by mouth 2 times daily    Ana Carrington MD       CURRENT MEDICATIONS:  Current Facility-Administered Medications   Medication Dose Route Frequency Provider Last Rate Last Admin    vancomycin (VANCOCIN) intermittent dosing (placeholder)   Other RX Placeholder Jose Garcia DO        vancomycin (VANCOCIN) 1,250 mg in sodium chloride 0.9 % 250 mL IVPB ([id number])  1,250 mg IntraVENous Q24H Jose Garcia IDO        atorvastatin (LIPITOR) tablet 10 mg  10 mg Oral Daily Everett Shea MD   10 mg at 03/07/24 0759    clopidogrel (PLAVIX) tablet 75 mg  75 mg Oral Daily Everett Shea MD   75 mg at 03/07/24 0759    pantoprazole (PROTONIX) tablet 40 mg  40 mg Oral QAM AC Everett Shea MD   40 mg at 03/07/24 0644    insulin glargine (LANTUS) injection vial 10 Units  10 Units SubCUTAneous Nightly Everett Shea MD   10 Units at 03/06/24 2281    midodrine  ER on 3/6 with left knee redness, warmth, and swelling.  She also had CAUTI present on admission.  Ortho was consulted and there is a plan for I&D with possible wound VAC application for tomorrow.  Wound is clean and red with some serosanguineous drainage.  There was some depth, however this was not measured due to pain. Recommend applying foam dressing or ABD and roll gauze for now and will follow-up with patient postop.     Response to treatment:  Well tolerated by patient.       Plan:     Plan of Care:     Left knee: Cleanse with saline, pat dry.  Apply foam dressing or ABD pad and roll gauze to wound, change daily and as needed if loose or soiled.     I&D scheduled for tomorrow, will follow-up on Monday          [x] Turn and reposition every 2 hours while in bed.    [x] Float heels off of bed with pillows under calves.    [] Heel protective boots (heel medix boots) at all times while in bed.    [] Sacral foam dressing to sacrococcygeal area. Use skin barrier film prior to placement. Peel back dressing, inspect skin beneath, and re-secure every shift. Change every 3 days and as needed if loose or soiled. Discontinue sacral foam if repeatedly soiled by incontinence.    [] Apply zinc oxide cream twice daily and as needed after incontinent episodes.    [] Perform routine incontinence care with use of foam cleanser.    [x] Use single layer moisture wicking underpad.    [x] Use comfort glide system and wedges to reposition patient.    [x] Keep the head of the bed below 30 degrees unless contraindicated.    [] Pressure reducing chair cushion while up to chair. Reposition every hour while in chair and limit chair time to 2 hour intervals.    [x] Encourage good nutritional intake and fluids. Consult dietician if needed.    Specialty Bed Required : Yes   [] Low Air Loss   [x] Pressure Redistribution  [] Fluid Immersion  [] Bariatric  [] Total Pressure Relief  [] Other:     Current Diet: ADULT DIET; Regular; 4 carb choices

## 2024-03-07 NOTE — CONSULTS
[facility] -   Infectious diseases evaluation  admission date 3/6/2024    reason for consultation:   Left leg cellulitis    Impression :   Current:  UTI  Chronic Winters catheter/changed on admission  Status post left total hip arthroplasty/displaced periprosthetic femur fracture status post revision 1/2024/  Left lateral knee postoperative wound /possible infection   Sepsis  History of ITP  CVA  Diabetes mellitus  Hypertension  Hyperlipidemia  Multiple antibiotics allergy  History of multidrug-resistant organism         HENCE:   Continue IV vancomycin and meropenem  Wound debridement planned by orthopedic surgery  Follow cultures and adjust antibiotics as needed  Pansensitive Pseudomonas aeruginosa and Candida albicans growth on urine culture from 3/1/2024  Lower extremity venous Doppler  Follow CBC and renal function    Infection Control Recommendations   [place] Precautions  Contact Isolation       Antimicrobial Stewardship Recommendations   Simplification of therapy  Targeted therapy      History of Present Illness:   Initial history:  Komal Crawford is a 83 y.o.-year-old female presented to hospital with left knee incision wound with swelling, warmth and pain associated with fever with a temperature max of 102.6.  Symptoms moderate to severe, no alleviating or aggravating factors.  She denied cough, denied nausea or vomiting, no diarrhea, no other complaints.  Initial WBC 15.1, lactic acid 2.6  Urinalysis cloudy, moderate leukocyte esterase, too numerous to count WBC  She also complaining of cloudy urine, Winters catheter was changed on admission  Left humerus CT 3/6/2024 showed  IMPRESSION:  1. Extensive subcutaneous edema throughout the left thigh and proximal aspect  of the left lower leg compatible with lymphedema or cellulitis.  No abscess  or soft tissue gas.  2. No acute osseous abnormality.  Interval changes  3/7/2024   Patient Vitals for the past 8 hrs:   BP  Needs: No Transportation Needs (2024)    PRAPARE - Transportation     Lack of Transportation (Medical): No     Lack of Transportation (Non-Medical): No   Physical Activity: Inactive (2023)    Exercise Vital Sign     Days of Exercise per Week: 0 days     Minutes of Exercise per Session: 0 min   Stress: Stress Concern Present (2023)    [employer] [place] [employer] - Occupational Stress Questionnaire     Feeling of Stress : To some extent   Social Connections: Moderately Integrated (2023)    Social Connection and Isolation Panel [NHANES]     Frequency of Communication with Friends and Family: Three times a week     Frequency of Social Gatherings with Friends and Family: Once a week     Attends [employer] Services: More than 4 times per year     Active Member of Clubs or Organizations: No     Attends Club or Organization Meetings: Never     Marital Status:    Intimate Partner Violence: Not on file   Housing Stability: Low Risk  (2024)    Housing Stability Vital Sign     Unable to Pay for Housing in the Last Year: No     Number of Places Lived in the Last Year: 1     Unstable Housing in the Last Year: No       Family History:     Family History   Problem Relation Age of Onset    Breast Cancer Mother     Cancer Mother         breast and uterine  41    Heart Disease Father     Heart Attack Father          32    Cancer Maternal Grandmother     Cancer Brother 52        bronchogenic adenocarcinoma cancer    Lung Cancer Brother     Cancer Sister         lung    Lung Cancer Sister          65    Breast Cancer Sister          57    Cancer Sister         breast      Medical Decision Making:   I have independently reviewed/ordered the following labs:    CBC with Differential:   Recent Labs     24  1604 24  0645   WBC 15.1* 19.8*   HGB 11.2* 9.7*   HCT 35.9* 30.5*    132*   LYMPHOPCT 6* 1*   MONOPCT 6 4     BMP:  Recent Labs

## 2024-03-07 NOTE — PROGRESS NOTES
Patient arrived from ED via bed. Telemetry applied and strip printed. Patient acclimated to room and call light. Bed in low locked position with 2 rails up.

## 2024-03-07 NOTE — ANESTHESIA PRE PROCEDURE
full on top, partial on bottom    Pulmonary:Negative Pulmonary ROS and normal exam  breath sounds clear to auscultation                             Cardiovascular:    (+) hypertension:, CAD:, CABG/stent ([facility]):      ECG reviewed  Rhythm: regular  Rate: normal  Echocardiogram reviewed         Beta Blocker:  Not on Beta Blocker         Neuro/Psych:   (+) CVA:, neuromuscular disease:, TIAdepression/anxiety              ROS comment: Essential Tremor  Restless Legs Syndrome GI/Hepatic/Renal:   (+) hiatal hernia, GERD:          Endo/Other:    (+) DiabetesType II DM, using insulin, blood dyscrasia (Chronic ITP (idiopathic thrombocytopenia)): anticoagulation therapy, thrombocytopenia and anemia, arthritis: OA., electrolyte abnormalities (hyponatremia).                 Abdominal:             Vascular: negative vascular ROS.         Other Findings:       Anesthesia Plan      general     ASA 3     (LMA)  Induction: intravenous.    MIPS: Postoperative opioids intended and Prophylactic antiemetics administered.  Anesthetic plan and risks discussed with patient.      Plan discussed with CRNA.                Rivera Koch MD   3/7/2024

## 2024-03-07 NOTE — PROGRESS NOTES
[facility]   IN-PATIENT SERVICE   [facility]    Progress note            Date:   3/7/2024  Patient name:  Komal Crawford  Date of admission:  3/6/2024  3:17 PM  MRN:   [id number]  Account:  [id number]  YOB: 1940  PCP:    Sabiha Bedolla MD  Room:   [facility]  Code Status:    Full Code    Chief Complaint:     Chief Complaint   Patient presents with    Wound Infection     Left knee redness and warmth, total knee done in January at [place]       History Obtained From:     patient    History of Present Illness:     The patient is a 83 y.o.  Non- / non  female who presents with Wound Infection (Left knee redness and warmth, total knee done in January at [place])   and she is admitted to the hospital for the management of left lower extremity cellulitis.    83-year-old female with past medical history significant for diabetes mellitus type 2, TIA, GERD, Winters's esophagus, chronic ITP presents to the hospital with concerns for left lower extremity edema and swelling that started 1-1/2 weeks back.  The patient reports that she underwent left ISHAN 1/20/2024, left total knee arthroplasty in January.  She was doing well postoperatively however for the past 1-1/2 weeks she has noted an opening in the anterior aspect of the left knee, with serosanguineous discharge, no reported puslike drainage, reports worsening erythema and swelling for the past 1-1/2-week.      Hematuria  Has neurogenic bladder, has had a Winters since the past 3 months now.  Reports hematuria.  Winters exchanged in the ER.    Otherwise denies GI concerns, cardiac or respiratory concerns    3/7/2024  Reports pain and swelling has improved significantly, reports redness is improving as well.      Past Medical History:     Past Medical History:   Diagnosis Date    Age-related cognitive decline     Ankle pain     Left ankle fracture in ortho boat.    Anxiety     B12 deficiency     Winters esophagus      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HCC) 1990    left with urinary incontinenc post surgical    Caffeine use     2 coffee/day, 1-2 tea per week    Cerebral artery occlusion with cerebral infarction (HCC)     Chronic back pain     Chronic ITP (idiopathic thrombocytopenia) (HCC)     CVA (cerebral infarction) 2008, 2010    balance issues, short term memory loss    Diabetic gastroparesis (HCC)     Diverticular disease 1986    GERD (gastroesophageal reflux disease)     Hiatal hernia     Hip fracture (HCC)     History of blood transfusion     History of decreased platelet count     Hyperlipemia     Hypertension     Internal hemorrhoid     Macular degeneration of left eye 1980's    S/P laser treatment    Nausea and vomiting     Neuropathy     Osteoarthritis     Ringing in ears     Self-catheterizes urinary bladder     6-7 times daily    TIA (transient ischemic attack) 2000    x1    Tubular adenoma     colon polyps    Type II or unspecified type diabetes mellitus without mention of complication, not stated as uncontrolled     Urinary incontinence     frequent UTI s/p infection, surgical dilatation lead to incontinence    Wears dentures     full on top, partial on bottom    Wears glasses         Past Surgical History:     Past Surgical History:   Procedure Laterality Date    APPENDECTOMY  1962    BLADDER SURGERY      bladder stimulator    BLADDER SUSPENSION  2002    multiple    CARDIAC CATHETERIZATION  2008    no stents    CARDIOVASCULAR STRESS TEST  12/2014    CATARACT REMOVAL WITH IMPLANT Left 11/07/2017    [facility]    CATARACT REMOVAL WITH IMPLANT Right 11/28/2017    [facility]    COLON SURGERY      colostomy reversal    COLONOSCOPY  04/07/2016    tubular adenoma x3; internal hemorrhoids    COLONOSCOPY N/A 11/06/2019    COLONOSCOPY DIAGNOSTIC performed by Eli Marinelli MD at [facility]O    COLONOSCOPY  11/06/2019    COLOSTOMY  1986    bowel obstruction/ rupture from diverticular disease, reversal 3 mos  3/7/2024 1734  Gross per 24 hour   Intake --   Output 1250 ml   Net -1250 ml         General Appearance:  alert, in mild to moderate discomfort, is anxious  Mental status: oriented to person, place, and time with normal affect  Head:  normocephalic, atraumatic.  Eye: no icterus, redness, pupils equal and reactive, extraocular eye movements intact, conjunctiva clear  Ear: normal external ear, no discharge, hearing intact  Nose:  no drainage noted  Mouth: mucous membranes dry  Neck: supple, no carotid bruits, thyroid not palpable  Lungs: Bilateral equal air entry, clear to ausculation, no wheezing, rales or rhonchi, normal effort  Cardiovascular: normal rate, regular rhythm, no murmur, gallop, rub.  Abdomen: Soft, nontender, nondistended, normal bowel sounds, no hepatomegaly or splenomegaly.  Has a Winters in, exchange 3/6/2025  Neurologic: There are no new focal motor or sensory deficits, normal muscle tone and bulk, no abnormal sensation, normal speech, cranial nerves II through XII grossly intact  Skin: As reported above  Extremities: Edema in the left lower extremity, asymmetrical as compared to the right, small opening in the anterior aspect of the knee incision, erythema with warmth more proximal.  Peripheral pulses felt, palpable.    Incision over the left hip, right hip look clean, mildly tender in the right hip. No fluctuance, warmth    Investigations:      Laboratory Testing:  Recent Results (from the past 24 hour(s))   Protime-INR    Collection Time: 03/06/24  7:17 PM   Result Value Ref Range    Protime 17.0 (H) 11.8 - 14.6 sec    INR 1.4    Lactic Acid    Collection Time: 03/06/24  7:17 PM   Result Value Ref Range    Lactic Acid 2.5 (H) 0.5 - 2.2 mmol/L   Hemoglobin A1C    Collection Time: 03/06/24  7:17 PM   Result Value Ref Range    Hemoglobin A1C 6.2 (H) 4.0 - 6.0 %    Estimated Avg Glucose 131 mg/dL   POC Glucose Fingerstick    Collection Time: 03/06/24  8:58 PM   Result Value Ref Range    POC Glucose  therapies and most importantly because of direct risk to patient if care not provided in a hospital setting.    Everett Shea MD  3/7/2024  6:41 PM    Copy sent to Sabiha Christina MD    Please note that this chart was generated using voice recognition Dragon dictation software.  Although every effort was made to ensure the accuracy of this automated transcription, some errors in transcription may have occurred.

## 2024-03-07 NOTE — PLAN OF CARE
Problem: Discharge Planning  Goal: Discharge to home or other facility with appropriate resources  3/7/2024 1826 by Terell Taylor RN  Outcome: Progressing     Problem: Pain  Goal: Verbalizes/displays adequate comfort level or baseline comfort level  3/7/2024 1826 by Terell Taylor RN  Outcome: Progressing     Problem: Safety - Adult  Goal: Free from fall injury  3/7/2024 1826 by Terell Taylor RN  Outcome: Progressing     Problem: Skin/Tissue Integrity  Goal: Absence of new skin breakdown  Description: 1.  Monitor for areas of redness and/or skin breakdown  2.  Assess vascular access sites hourly  3.  Every 4-6 hours minimum:  Change oxygen saturation probe site  4.  Every 4-6 hours:  If on nasal continuous positive airway pressure, respiratory therapy assess nares and determine need for appliance change or resting period.  3/7/2024 1826 by Terell Taylor RN  Outcome: Progressing     Problem: Chronic Conditions and Co-morbidities  Goal: Patient's chronic conditions and co-morbidity symptoms are monitored and maintained or improved  Outcome: Progressing     Problem: Chronic Conditions and Co-morbidities  Goal: Patient's chronic conditions and co-morbidity symptoms are monitored and maintained or improved  Outcome: Progressing

## 2024-03-07 NOTE — CARE COORDINATION
[facility] is accepted to [facility], Patient does not need a three night stay for admission.//tv

## 2024-03-07 NOTE — ACP (ADVANCE CARE PLANNING)
Advance Care Planning     Advance Care Planning Activator (Inpatient)  Conversation Note      Date of ACP Conversation: 3/7/2024     Conversation Conducted with: Patient with Decision Making Capacity    ACP Activator: Nelly Padron RN        Health Care Decision Maker:     Current Designated Health Care Decision Maker:     Primary Decision Maker: Ritchie Crawford - Spouse - [phone]  Click here to complete Healthcare Decision Makers including section of the Healthcare Decision Maker Relationship (ie \"Primary\")      Care Preferences    Ventilation:  \"If you were in your present state of health and suddenly became very ill and were unable to breathe on your own, what would your preference be about the use of a ventilator (breathing machine) if it were available to you?\"  Yes    Would the patient desire the use of ventilator (breathing machine)?:     \"If your health worsens and it becomes clear that your chance of recovery is unlikely, what would your preference be about the use of a ventilator (breathing machine) if it were available to you?\"     Would the patient desire the use of ventilator (breathing machine)?: Yes      Resuscitation  \"CPR works best to restart the heart when there is a sudden event, like a heart attack, in someone who is otherwise healthy. Unfortunately, CPR does not typically restart the heart for people who have serious health conditions or who are very sick.\"    \"In the event your heart stopped as a result of an underlying serious health condition, would you want attempts to be made to restart your heart (answer \"yes\" for attempt to resuscitate) or would you prefer a natural death (answer \"no\" for do not attempt to resuscitate)?\" yes       [] Yes   [] No   Educated Patient / Decision Maker regarding differences between Advance Directives and portable DNR orders.    Length of ACP Conversation in minutes:      Conversation Outcomes:  ACP discussion completed    Follow-up plan:    [] Schedule  follow-up conversation to continue planning  [] Referred individual to Provider for additional questions/concerns   [] Advised patient/agent/surrogate to review completed ACP document and update if needed with changes in condition, patient preferences or care setting    [] This note routed to one or more involved healthcare providers

## 2024-03-07 NOTE — CARE COORDINATION
Case Management Assessment  Initial Evaluation    Date/Time of Evaluation: 3/7/2024 12:17 PM  Assessment Completed by: Nelly Padron RN    If patient is discharged prior to next notation, then this note serves as note for discharge by case management.    Patient Name: Komal Crawford                   YOB: 1940  Diagnosis: Acute cystitis without hematuria [N30.00]  Cellulitis of left lower extremity [L03.116]  Sepsis (HCC) [A41.9]  Severe sepsis (HCC) [A41.9, R65.20]                   Date / Time: 3/6/2024  3:17 PM    Patient Admission Status: Inpatient   Readmission Risk (Low < 19, Mod (19-27), High > 27): Readmission Risk Score: 29.9    Current PCP: Sabiha Bedolla MD  PCP verified by CM? No    Chart Reviewed: Yes      History Provided by: Patient, Significant Other  Patient Orientation: Alert and Oriented    Patient Cognition: Alert    Hospitalization in the last 30 days (Readmission):  Yes    If yes, Readmission Assessment in CM Navigator will be completed.    Advance Directives:      Code Status: Full Code   Patient's Primary Decision Maker is: Named in Scanned ACP Document    Primary Decision Maker: Ritchie Crawford - Spouse - [phone]    Discharge Planning:    Patient lives with: Spouse/Significant Other Type of Home: Trailer/Mobile Home  Primary Care Giver: Other (Comment) (from [facility])  Patient Support Systems include: Spouse/Significant Other, Family Members   Current Financial resources: Medicare  Current community resources: None  Current services prior to admission: Durable Medical Equipment            Current DME: Bedside [employer]            Type of Home Care services:  None    ADLS  Prior functional level: Assistance with the following:  Current functional level: Assistance with the following:    PT AM-PAC:   /24  OT AM-PAC:   /24    Family can provide assistance at DC: No  Would you like Case Management to discuss the discharge plan with any other family  Department  Ph:  Fax:

## 2024-03-07 NOTE — CONSULTS
ORTHOPAEDIC CONSULTATION    Reason for consult  Left knee pain and drainage.    HPI / Chief Complaint  Komal Crawford is a 83 y.o. old female who presents for for left knee pain drainage.  Patient has a history of a periprosthetic femur fracture that was discovered during a total hip arthroplasty on the left side.  She was noted to have a displaced periprosthetic total knee fracture on the ipsilateral side.  She has subsequently went a revision total knee arthroplasty with a OSS hinged prosthesis with antibiotic impregnated cemented stems.  Patient had no obvious periprosthetic/postoperative problems and was recently seen by me in the office and noted to have a completely benign wound and getting around very well.  Patient notes that about 2 or 3 days ago she had a small punctate area which she had some drainage anteriorly in the midportion of her incision.  This was a serosanguineous in nature.  She denied any fever chills or falls.  Patient has just recently been admitted for possible sepsis no concern of possibility of a wound infection but also for possible urosepsis as well.    Past Medical History  Komal  has a past medical history of Age-related cognitive decline, Ankle pain, Anxiety, B12 deficiency, Winters esophagus, 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HCC), Caffeine use, Cerebral artery occlusion with cerebral infarction (HCC), Chronic back pain, Chronic ITP (idiopathic thrombocytopenia) (HCC), CVA (cerebral infarction), Diabetic gastroparesis (HCC), Diverticular disease, GERD (gastroesophageal reflux disease), Hiatal hernia, Hip fracture (HCC), History of blood transfusion, History of decreased platelet count, Hyperlipemia, Hypertension, Internal hemorrhoid, Macular degeneration of left eye, Nausea and vomiting, Neuropathy, Osteoarthritis, Ringing in ears, Self-catheterizes urinary bladder, TIA (transient ischemic attack), Tubular adenoma, Type II or unspecified type diabetes mellitus without mention of  clindamycin/lincomycin, etodolac, fesoterodine, fluorescein, iodine, lisinopril, penicillin g, toviaz [fesoterodine fumarate er], clonazepam, and metformin and related.    Social History  Komal  reports that she quit smoking about 41 years ago. Her smoking use included cigarettes. She started smoking about 71 years ago. She has a 15.0 pack-year smoking history. She quit smokeless tobacco use about 41 years ago. She reports that she does not drink alcohol and does not use drugs.    Family History  Komal's family history includes Breast Cancer in her mother and sister; Cancer in her maternal grandmother, mother, sister, and sister; Cancer (age of onset: 52) in her brother; Heart Attack in her father; Heart Disease in her father; Lung Cancer in her brother and sister.      Review of Systems   History obtained from the patient.   Constitution: no fever or chills  Musculoskeletal: As noted in the HPI   Neurologic: pain    Physical Exam  BP (!) 120/58   Pulse 87   Temp 98.4 °F (36.9 °C) (Oral)   Resp 16   Ht 1.473 m (4' 10\")   Wt 73 kg (161 lb)   SpO2 93%   BMI 33.65 kg/m²   General Appearance: oriented to person, place, and time  Mental Status: alert, oriented to person, place, and time  Examination the patient's left knee notes that in the midportion of her incision a small punctate area near the prepatellar bursa was some scant drainage.  This is actually been bloody in nature and is not purulent in nature there is no foul odor she has no surrounding redness or erythema.  She does have little bit of warmth of the knee but not unusual for postoperative.  She has relatively pain-free range of motion.      Imaging Studies  CT of the patient's left femur performed on 3/6/2024    Soft Tissue: A Winters catheter is in place.  The visualized soft tissue  contents of the pelvis are otherwise grossly unremarkable.  Extensive  subcutaneous edema throughout the left thigh and proximal aspect of the lower  leg.  No drainable

## 2024-03-08 ENCOUNTER — ANESTHESIA (OUTPATIENT)
Dept: OPERATING ROOM | Age: 84
End: 2024-03-08
Payer: MEDICARE

## 2024-03-08 PROBLEM — T84.59XA INFECTION OF TOTAL KNEE REPLACEMENT (HCC): Status: ACTIVE | Noted: 2024-03-08

## 2024-03-08 PROBLEM — Z96.659 INFECTION OF TOTAL KNEE REPLACEMENT (HCC): Status: ACTIVE | Noted: 2024-03-08

## 2024-03-08 LAB
ANION GAP SERPL CALCULATED.3IONS-SCNC: 10 MMOL/L (ref 9–17)
BASOPHILS # BLD: 0 K/UL (ref 0–0.2)
BASOPHILS NFR BLD: 0 % (ref 0–2)
BUN SERPL-MCNC: 15 MG/DL (ref 8–23)
CALCIUM SERPL-MCNC: 8.3 MG/DL (ref 8.6–10.4)
CHLORIDE SERPL-SCNC: 101 MMOL/L (ref 98–107)
CO2 SERPL-SCNC: 22 MMOL/L (ref 20–31)
CREAT SERPL-MCNC: 0.6 MG/DL (ref 0.5–0.9)
DATE LAST DOSE: NORMAL
ECHO BSA: 1.73 M2
EOSINOPHIL # BLD: 0.12 K/UL (ref 0–0.4)
EOSINOPHILS RELATIVE PERCENT: 1 % (ref 0–4)
ERYTHROCYTE [DISTWIDTH] IN BLOOD BY AUTOMATED COUNT: 18.1 % (ref 11.5–14.9)
GFR SERPL CREATININE-BSD FRML MDRD: >60 ML/MIN/1.73M2
GLUCOSE BLD-MCNC: 101 MG/DL (ref 65–105)
GLUCOSE BLD-MCNC: 124 MG/DL (ref 65–105)
GLUCOSE BLD-MCNC: 167 MG/DL (ref 65–105)
GLUCOSE BLD-MCNC: 81 MG/DL (ref 65–105)
GLUCOSE BLD-MCNC: 90 MG/DL (ref 65–105)
GLUCOSE SERPL-MCNC: 113 MG/DL (ref 70–99)
HCT VFR BLD AUTO: 28.1 % (ref 36–46)
HGB BLD-MCNC: 9.2 G/DL (ref 12–16)
LYMPHOCYTES NFR BLD: 0.85 K/UL (ref 1–4.8)
LYMPHOCYTES RELATIVE PERCENT: 7 % (ref 24–44)
MAGNESIUM SERPL-MCNC: 1.6 MG/DL (ref 1.6–2.6)
MCH RBC QN AUTO: 29.9 PG (ref 26–34)
MCHC RBC AUTO-ENTMCNC: 32.7 G/DL (ref 31–37)
MCV RBC AUTO: 91.4 FL (ref 80–100)
MONOCYTES NFR BLD: 0.73 K/UL (ref 0.1–1.3)
MONOCYTES NFR BLD: 6 % (ref 1–7)
MORPHOLOGY: ABNORMAL
NEUTROPHILS NFR BLD: 86 % (ref 36–66)
NEUTS SEG NFR BLD: 10.4 K/UL (ref 1.3–9.1)
PLATELET # BLD AUTO: 107 K/UL (ref 150–450)
PMV BLD AUTO: 12.7 FL (ref 6–12)
POTASSIUM SERPL-SCNC: 3.7 MMOL/L (ref 3.7–5.3)
RBC # BLD AUTO: 3.08 M/UL (ref 4–5.2)
SODIUM SERPL-SCNC: 133 MMOL/L (ref 135–144)
TME LAST DOSE: 1814 H
VANCOMYCIN DOSE: 1250 MG
VANCOMYCIN SERPL-MCNC: 15 UG/ML
WBC OTHER # BLD: 12.1 K/UL (ref 3.5–11)

## 2024-03-08 PROCEDURE — 51798 US URINE CAPACITY MEASURE: CPT

## 2024-03-08 PROCEDURE — 93970 EXTREMITY STUDY: CPT | Performed by: SURGERY

## 2024-03-08 PROCEDURE — 36415 COLL VENOUS BLD VENIPUNCTURE: CPT

## 2024-03-08 PROCEDURE — 85025 COMPLETE CBC W/AUTO DIFF WBC: CPT

## 2024-03-08 PROCEDURE — 80048 BASIC METABOLIC PNL TOTAL CA: CPT

## 2024-03-08 PROCEDURE — 2580000003 HC RX 258: Performed by: ORTHOPAEDIC SURGERY

## 2024-03-08 PROCEDURE — 87186 SC STD MICRODIL/AGAR DIL: CPT

## 2024-03-08 PROCEDURE — 2500000003 HC RX 250 WO HCPCS: Performed by: NURSE ANESTHETIST, CERTIFIED REGISTERED

## 2024-03-08 PROCEDURE — 99233 SBSQ HOSP IP/OBS HIGH 50: CPT | Performed by: INTERNAL MEDICINE

## 2024-03-08 PROCEDURE — 99222 1ST HOSP IP/OBS MODERATE 55: CPT | Performed by: PHYSICAL MEDICINE & REHABILITATION

## 2024-03-08 PROCEDURE — 7100000001 HC PACU RECOVERY - ADDTL 15 MIN: Performed by: ORTHOPAEDIC SURGERY

## 2024-03-08 PROCEDURE — 6360000002 HC RX W HCPCS: Performed by: ORTHOPAEDIC SURGERY

## 2024-03-08 PROCEDURE — 83735 ASSAY OF MAGNESIUM: CPT

## 2024-03-08 PROCEDURE — 86403 PARTICLE AGGLUT ANTBDY SCRN: CPT

## 2024-03-08 PROCEDURE — 2720000010 HC SURG SUPPLY STERILE: Performed by: ORTHOPAEDIC SURGERY

## 2024-03-08 PROCEDURE — 0KBT0ZZ EXCISION OF LEFT LOWER LEG MUSCLE, OPEN APPROACH: ICD-10-PCS | Performed by: ORTHOPAEDIC SURGERY

## 2024-03-08 PROCEDURE — 3700000000 HC ANESTHESIA ATTENDED CARE: Performed by: ORTHOPAEDIC SURGERY

## 2024-03-08 PROCEDURE — 7100000000 HC PACU RECOVERY - FIRST 15 MIN: Performed by: ORTHOPAEDIC SURGERY

## 2024-03-08 PROCEDURE — 27310 EXPLORATION OF KNEE JOINT: CPT | Performed by: ORTHOPAEDIC SURGERY

## 2024-03-08 PROCEDURE — 2580000003 HC RX 258: Performed by: NURSE ANESTHETIST, CERTIFIED REGISTERED

## 2024-03-08 PROCEDURE — 6360000002 HC RX W HCPCS: Performed by: NURSE PRACTITIONER

## 2024-03-08 PROCEDURE — 87205 SMEAR GRAM STAIN: CPT

## 2024-03-08 PROCEDURE — 3600000002 HC SURGERY LEVEL 2 BASE: Performed by: ORTHOPAEDIC SURGERY

## 2024-03-08 PROCEDURE — 3600000012 HC SURGERY LEVEL 2 ADDTL 15MIN: Performed by: ORTHOPAEDIC SURGERY

## 2024-03-08 PROCEDURE — 2060000000 HC ICU INTERMEDIATE R&B

## 2024-03-08 PROCEDURE — 6360000002 HC RX W HCPCS: Performed by: INTERNAL MEDICINE

## 2024-03-08 PROCEDURE — 80202 ASSAY OF VANCOMYCIN: CPT

## 2024-03-08 PROCEDURE — 87070 CULTURE OTHR SPECIMN AEROBIC: CPT

## 2024-03-08 PROCEDURE — 87075 CULTR BACTERIA EXCEPT BLOOD: CPT

## 2024-03-08 PROCEDURE — 3700000001 HC ADD 15 MINUTES (ANESTHESIA): Performed by: ORTHOPAEDIC SURGERY

## 2024-03-08 PROCEDURE — 2709999900 HC NON-CHARGEABLE SUPPLY: Performed by: ORTHOPAEDIC SURGERY

## 2024-03-08 PROCEDURE — 2580000003 HC RX 258: Performed by: INTERNAL MEDICINE

## 2024-03-08 PROCEDURE — 82947 ASSAY GLUCOSE BLOOD QUANT: CPT

## 2024-03-08 PROCEDURE — 6370000000 HC RX 637 (ALT 250 FOR IP): Performed by: ORTHOPAEDIC SURGERY

## 2024-03-08 PROCEDURE — 6360000002 HC RX W HCPCS: Performed by: NURSE ANESTHETIST, CERTIFIED REGISTERED

## 2024-03-08 PROCEDURE — 6360000002 HC RX W HCPCS: Performed by: ANESTHESIOLOGY

## 2024-03-08 RX ORDER — FENTANYL CITRATE 0.05 MG/ML
50 INJECTION, SOLUTION INTRAMUSCULAR; INTRAVENOUS EVERY 5 MIN PRN
Status: DISCONTINUED | OUTPATIENT
Start: 2024-03-08 | End: 2024-03-08 | Stop reason: HOSPADM

## 2024-03-08 RX ORDER — SODIUM CHLORIDE 0.9 % (FLUSH) 0.9 %
5-40 SYRINGE (ML) INJECTION EVERY 12 HOURS SCHEDULED
Status: DISCONTINUED | OUTPATIENT
Start: 2024-03-08 | End: 2024-03-08 | Stop reason: HOSPADM

## 2024-03-08 RX ORDER — PROPOFOL 10 MG/ML
INJECTION, EMULSION INTRAVENOUS PRN
Status: DISCONTINUED | OUTPATIENT
Start: 2024-03-08 | End: 2024-03-08 | Stop reason: SDUPTHER

## 2024-03-08 RX ORDER — SODIUM CHLORIDE 0.9 % (FLUSH) 0.9 %
5-40 SYRINGE (ML) INJECTION PRN
Status: DISCONTINUED | OUTPATIENT
Start: 2024-03-08 | End: 2024-03-08 | Stop reason: HOSPADM

## 2024-03-08 RX ORDER — PHENYLEPHRINE HYDROCHLORIDE 10 MG/ML
INJECTION INTRAVENOUS PRN
Status: DISCONTINUED | OUTPATIENT
Start: 2024-03-08 | End: 2024-03-08 | Stop reason: SDUPTHER

## 2024-03-08 RX ORDER — SODIUM CHLORIDE 9 MG/ML
INJECTION, SOLUTION INTRAVENOUS PRN
Status: DISCONTINUED | OUTPATIENT
Start: 2024-03-08 | End: 2024-03-08 | Stop reason: HOSPADM

## 2024-03-08 RX ORDER — ONDANSETRON 2 MG/ML
INJECTION INTRAMUSCULAR; INTRAVENOUS PRN
Status: DISCONTINUED | OUTPATIENT
Start: 2024-03-08 | End: 2024-03-08 | Stop reason: SDUPTHER

## 2024-03-08 RX ORDER — ONDANSETRON 2 MG/ML
4 INJECTION INTRAMUSCULAR; INTRAVENOUS
Status: DISCONTINUED | OUTPATIENT
Start: 2024-03-08 | End: 2024-03-08 | Stop reason: HOSPADM

## 2024-03-08 RX ORDER — FENTANYL CITRATE 0.05 MG/ML
50 INJECTION, SOLUTION INTRAMUSCULAR; INTRAVENOUS ONCE
Status: COMPLETED | OUTPATIENT
Start: 2024-03-08 | End: 2024-03-08

## 2024-03-08 RX ORDER — LIDOCAINE HYDROCHLORIDE 20 MG/ML
INJECTION, SOLUTION EPIDURAL; INFILTRATION; INTRACAUDAL; PERINEURAL PRN
Status: DISCONTINUED | OUTPATIENT
Start: 2024-03-08 | End: 2024-03-08 | Stop reason: SDUPTHER

## 2024-03-08 RX ORDER — FENTANYL CITRATE 0.05 MG/ML
25 INJECTION, SOLUTION INTRAMUSCULAR; INTRAVENOUS EVERY 5 MIN PRN
Status: DISCONTINUED | OUTPATIENT
Start: 2024-03-08 | End: 2024-03-08 | Stop reason: HOSPADM

## 2024-03-08 RX ORDER — SODIUM CHLORIDE 9 MG/ML
INJECTION, SOLUTION INTRAVENOUS CONTINUOUS PRN
Status: DISCONTINUED | OUTPATIENT
Start: 2024-03-08 | End: 2024-03-08 | Stop reason: SDUPTHER

## 2024-03-08 RX ORDER — FENTANYL CITRATE 50 UG/ML
INJECTION, SOLUTION INTRAMUSCULAR; INTRAVENOUS PRN
Status: DISCONTINUED | OUTPATIENT
Start: 2024-03-08 | End: 2024-03-08 | Stop reason: SDUPTHER

## 2024-03-08 RX ORDER — DIPHENHYDRAMINE HYDROCHLORIDE 50 MG/ML
12.5 INJECTION INTRAMUSCULAR; INTRAVENOUS
Status: COMPLETED | OUTPATIENT
Start: 2024-03-08 | End: 2024-03-08

## 2024-03-08 RX ORDER — VANCOMYCIN HYDROCHLORIDE 1 G/20ML
INJECTION, POWDER, LYOPHILIZED, FOR SOLUTION INTRAVENOUS PRN
Status: DISCONTINUED | OUTPATIENT
Start: 2024-03-08 | End: 2024-03-08 | Stop reason: ALTCHOICE

## 2024-03-08 RX ADMIN — SODIUM CHLORIDE, POTASSIUM CHLORIDE, SODIUM LACTATE AND CALCIUM CHLORIDE: 600; 310; 30; 20 INJECTION, SOLUTION INTRAVENOUS at 09:54

## 2024-03-08 RX ADMIN — TIZANIDINE 2 MG: 2 TABLET ORAL at 15:39

## 2024-03-08 RX ADMIN — SODIUM CHLORIDE: 900 INJECTION, SOLUTION INTRAVENOUS at 12:02

## 2024-03-08 RX ADMIN — INSULIN GLARGINE 10 UNITS: 100 INJECTION, SOLUTION SUBCUTANEOUS at 22:26

## 2024-03-08 RX ADMIN — MEROPENEM 1000 MG: 1 INJECTION, POWDER, FOR SOLUTION INTRAVENOUS at 06:46

## 2024-03-08 RX ADMIN — DIPHENHYDRAMINE HYDROCHLORIDE 12.5 MG: 50 INJECTION INTRAMUSCULAR; INTRAVENOUS at 14:07

## 2024-03-08 RX ADMIN — ONDANSETRON 4 MG: 2 INJECTION INTRAMUSCULAR; INTRAVENOUS at 12:17

## 2024-03-08 RX ADMIN — LIDOCAINE HYDROCHLORIDE 50 MG: 20 INJECTION, SOLUTION EPIDURAL; INFILTRATION; INTRACAUDAL; PERINEURAL at 12:08

## 2024-03-08 RX ADMIN — FENTANYL CITRATE 50 MCG: 50 INJECTION, SOLUTION INTRAMUSCULAR; INTRAVENOUS at 12:11

## 2024-03-08 RX ADMIN — VANCOMYCIN HYDROCHLORIDE 1500 MG: 1.5 INJECTION, POWDER, LYOPHILIZED, FOR SOLUTION INTRAVENOUS at 19:36

## 2024-03-08 RX ADMIN — PROPOFOL 100 MG: 10 INJECTION, EMULSION INTRAVENOUS at 12:08

## 2024-03-08 RX ADMIN — MEROPENEM 1000 MG: 1 INJECTION, POWDER, FOR SOLUTION INTRAVENOUS at 15:47

## 2024-03-08 RX ADMIN — FENTANYL CITRATE 50 MCG: 50 INJECTION, SOLUTION INTRAMUSCULAR; INTRAVENOUS at 12:08

## 2024-03-08 RX ADMIN — ROPINIROLE HYDROCHLORIDE 4 MG: 1 TABLET, FILM COATED ORAL at 22:26

## 2024-03-08 RX ADMIN — MEROPENEM 1000 MG: 1 INJECTION, POWDER, FOR SOLUTION INTRAVENOUS at 22:30

## 2024-03-08 RX ADMIN — RANOLAZINE 500 MG: 500 TABLET, EXTENDED RELEASE ORAL at 22:26

## 2024-03-08 RX ADMIN — PHENYLEPHRINE HYDROCHLORIDE 100 MCG: 10 INJECTION INTRAVENOUS at 13:02

## 2024-03-08 RX ADMIN — FENTANYL CITRATE 50 MCG: 50 INJECTION INTRAMUSCULAR; INTRAVENOUS at 06:46

## 2024-03-08 RX ADMIN — SODIUM CHLORIDE, PRESERVATIVE FREE 10 ML: 5 INJECTION INTRAVENOUS at 22:27

## 2024-03-08 RX ADMIN — PROPOFOL 50 MG: 10 INJECTION, EMULSION INTRAVENOUS at 12:11

## 2024-03-08 RX ADMIN — SODIUM CHLORIDE, POTASSIUM CHLORIDE, SODIUM LACTATE AND CALCIUM CHLORIDE: 600; 310; 30; 20 INJECTION, SOLUTION INTRAVENOUS at 17:14

## 2024-03-08 RX ADMIN — PRIMIDONE 50 MG: 50 TABLET ORAL at 22:26

## 2024-03-08 ASSESSMENT — PAIN - FUNCTIONAL ASSESSMENT
PAIN_FUNCTIONAL_ASSESSMENT: CRITICAL CARE PAIN OBSERVATION TOOL (CPOT)
PAIN_FUNCTIONAL_ASSESSMENT: 0-10

## 2024-03-08 ASSESSMENT — PAIN DESCRIPTION - LOCATION
LOCATION: LEG;KNEE
LOCATION: KNEE

## 2024-03-08 ASSESSMENT — PAIN DESCRIPTION - ORIENTATION
ORIENTATION: LEFT
ORIENTATION: LEFT

## 2024-03-08 ASSESSMENT — PAIN SCALES - GENERAL
PAINLEVEL_OUTOF10: 0
PAINLEVEL_OUTOF10: 9

## 2024-03-08 ASSESSMENT — PAIN DESCRIPTION - DESCRIPTORS
DESCRIPTORS: THROBBING;TIGHTNESS
DESCRIPTORS: DISCOMFORT;SPASM

## 2024-03-08 NOTE — PROGRESS NOTES
Vancomycin Dosing by Pharmacy - Daily Note   Vancomycin Therapy Day:  3  Indication: Sepsis    Allergies:  Iodides, Iodinated contrast media, Povidone-iodine, Sulfa antibiotics, Betadine [povidone iodine], Cephalexin, Cephalexin, Cozaar [losartan], Cymbalta [duloxetine hcl], Demerol, Doxycycline, Duloxetine, Meperidine, Morphine, Penicillins, Zithromax [azithromycin], Ciprofloxacin, Clindamycin/lincomycin, Etodolac, Fesoterodine, Fluorescein, Iodine, Lisinopril, Penicillin g, Toviaz [fesoterodine fumarate er], Clonazepam, and Metformin and related   Actual Weight:    Wt Readings from Last 1 Encounters:   03/06/24 73 kg (161 lb)       Labs/Ancillary Data  Estimated Creatinine Clearance: 66 mL/min (based on SCr of 0.6 mg/dL).  Recent Labs     03/06/24  1604 03/06/24  1750 03/07/24  0645 03/08/24  0708   CREATININE 0.7 0.7 0.7 0.6   BUN 14 14 14 15   WBC 15.1*  --  19.8* 12.1*     Procalcitonin   Date Value Ref Range Status   03/06/2024 0.13 (H) <0.09 ng/mL Final     Comment:           Suspected Sepsis:  <0.50 ng/mL     Low likelihood of sepsis.  0.50-2.00 ng/mL     Increased likelihood of sepsis. Antibiotics encouraged.  >2.00 ng/mL     High risk of sepsis/shock. Antibiotics strongly encouraged.        Suspected Lower Resp Tract Infections:  <0.24 ng/mL     Low likelihood of bacterial infection.  >0.24 ng/mL     Increased likelihood of bacterial infection. Antibiotics encouraged.        With successful antibiotic therapy, PCT levels should decrease rapidly. (Half-life of 24 to   36 hours.)        Procalcitonin values from samples collected within the first 6 hours of systemic infection   may still be low. Retesting may be indicated.  Values from day 1 and day 4 can be entered into the Change in Procalcitonin Calculator   ([email or web address]-pct-[email or web address]) to determine the patient's Mortality Risk Prognosis        In healthy neonates, plasma Procalcitonin (PCT) concentrations increase gradually after   birth, reaching  peak values at about 24 hours of age then decrease to normal values below   0.5 ng/mL by 48-72 hours of age.         Intake/Output Summary (Last 24 hours) at 3/8/2024 1033  Last data filed at 3/8/2024 0839  Gross per 24 hour   Intake --   Output 750 ml   Net -750 ml     Temp: 98.8    Culture Date / Source  /  Results  See Micro  Recent vancomycin administrations                     vancomycin (VANCOCIN) 1,250 mg in sodium chloride 0.9 % 250 mL IVPB ([id number]) (mg) 1,250 mg New Bag 03/07/24 1814    vancomycin (VANCOCIN) 1,750 mg in sodium chloride 0.9 % 500 mL IVPB (mg) 1,750 mg New Bag 03/06/24 1739                    Vancomycin Concentrations:   TROUGH:  No results for input(s): \"VANCOTROUGH\" in the last 72 hours.  RANDOM:    Recent Labs     03/08/24  0708   VANCORANDOM 15.0       MRSA Nasal Swab: N/A. Non-respiratory infection..    PLAN     Increase dose to 1500 mg q24h IV  Ensured BUN/sCr ordered at baseline and every 48 hours x at least 3 levels, then at least weekly.  Repeat vancomycin concentration ordered for 03/10 @ 0600   Pharmacy will continue to monitor patient and adjust therapy as indicated      Vancomycin Target Concentration Parameters  Treatment  Population Target AUC/SARAH Target Trough   Invasive MRSA Infection (bacteremia, pneumonia, meningitis, endocarditis, osteomyelitis)  Sepsis (undifferentiated) 400-600 N/A   Infection due to non-MRSA pathogen  Empiric treatment of non-invasive MRSA infection  (SSTI, UTI) <500 10-15 mg/L   CrCl < 29 mL/min  Rapidly fluctuating serum creatinine   FATEMEH N/A < 15 mg/L     Renal replacement therapy is dosed by levels, per hospital protocol.  Abbreviations  * Pauc: probability that AUC is >400 (efficacy); Pconc: probability that Ctrough is above 20 ?g/mL (toxicity); Tox: Probability of nephrotoxicity, based on Kwasi et al. Clin Infect Dis 2009.    Loading dose: N/A  Regimen: 1500 mg IV every 24 hours.  Start time: 18:14 on 03/08/2024  Exposure target: AUC24  (range)400-600 mg/L.hr   AUC24,ss: 523 mg/L.hr  Probability of AUC24 > 400: 96 %  Ctrough,ss: 12.6 mg/L  Probability of Ctrough,ss > 20: 4 %  Probability of nephrotoxicity (Lodise HUYEN 2009): 8 %    Thank you for the consult.  Pharmacy will continue to follow.   Lyle Alcazar, PharmD. [facility]  3/8/2024  10:50 AM

## 2024-03-08 NOTE — CONSULTS
Physical Medicine & Rehabilitation  Consult Note      Admitting Physician: Bala Olivares MD    Primary Care Provider: Sabiha Bedolla MD     Reason for Consult:  Acute Inpatient Rehabilitation    Chief Complaint: Wound infection left knee    History of Present Illness:  Referring Provider is requesting an evaluation for appropriate placement upon discharge from acute care.     Ms. Komal Crawford is a 83 y.o. female who was admitted to [facility] on 3/6/2024 with Wound Infection (Left knee redness and warmth, total knee done in January at [place])    83-year-old female with history of type 2 diabetes, TIA, GERD, Winters's esophagus, chronic ITP presents with  left lower extremity edema and swelling started 1 and half weeks previously.  She had undergone left total hip arthroplasty 1/20/2024.  Was doing well postoperatively however last 1 and half weeks noted open anterior aspect left knee with serosanguineous discharge she also has neurogenic bladder has been on Winters for last 3 months.  Has hematuria Winters exchanged in ER    Recent admission to [facility] 1/26/2024 through 2/12/2024    Recent GI evaluation 1/21-rectal prolapse, Linzess and Metamucil high-fiber diet    ID 3/7-left knee postoperative wound infection/cellulitis, chronic Winters history of left total hip arthroplasty displaced periprosthetic femur fracture status post left total knee arthroplasty revision continue IV Vanco and meropenem left knee I&D and wound VAC application per orthopedics    Internal medicine wound culture pending, ID as above, thrombocytopenia does have chronic ITP monitor closely Doppler lower extremity negative for DVT renal ultrasound did not show hydronephrosis    Ortho note infected knee has antibiotic impregnated spacer prosthesis if removed risk of amputation, patient back for IV antibiotics in the short-term likely chronic continue wound VAC    Urology treated fo CAUTI await urine culture maintain Winters at  vehicle)  Has the patient had two or more falls in the past year or any fall with injury in the past year?: Yes  Receives Help From: Family  ADL Assistance: Independent  Bath: Independent  Dressing: Independent  Grooming: Independent  Feeding: Independent  Toileting: Independent  Homemaking Assistance: Independent (pt is primary)  Meal Prep: Minimal  Laundry: Minimal  Vacuuming: Minimal  Cleaning: Minimal  Gardening: Moderate  Yard Work: Moderate  Driving:  (NA)  Shopping: Minimal  :  (NA)  Other (Comment): Other (comment)  Homemaking Responsibilities: Yes (pt is primary)  Ambulation Assistance: Independent (RW in the house, Rollator in the community)  Transfer Assistance: Independent  Active : Yes  Mode of Transportation: Van  Occupation: Retired  Type of Occupation:   Leisure & Hobbies: Reading and sewing  IADL Comments: sleeps in an adjustable bed, has one rail on spouse's side.  Additional Comments: Pt provides some A for her spouse due to him being legally blind.  He is cognitively intact and able to ambulate with a walker/cane. Mostly he requires A for home mgt tasks and transportation.    Family History:       Problem Relation Age of Onset    Breast Cancer Mother     Cancer Mother         breast and uterine  41    Heart Disease Father     Heart Attack Father          32    Cancer Maternal Grandmother     Cancer Brother 52        bronchogenic adenocarcinoma cancer    Lung Cancer Brother     Cancer Sister         lung    Lung Cancer Sister          65    Breast Cancer Sister          57    Cancer Sister         breast           Physical Exam:    BP (!) 151/76   Pulse 70   Temp 98.2 °F (36.8 °C) (Oral)   Resp 18   Ht 1.473 m (4' 10\")   Wt 73 kg (161 lb)   SpO2 95%   BMI 33.65 kg/m²     General appearance: alert, appears stated age, cooperative, and no distress  HEENT: Normocephalic, without obvious abnormality, atraumatic               Eyes:  stent-Plavix  Hypertension/hyperlipidemia-Lipitor, ProAmatine, Ranexa  Chronic Winters/intermittent cath  Type 2 diabetes  GERD  Winters's esophagus  Chronic ITP-platelet 107 decreased from 193  Constipation  Anemia hemoglobin 9.2  Tremor/restless leg syndrome-Requip  Obesity  Diabetes-insulin    Recommendations:  1. Diagnosis: Infected left knee  2. Therapy: Pending  3. Medical  Necessity: As above  4. Support: Clarify  5. Rehab recommendation: Disposition recommendation follow therapy evaluation    Will need clarification length of antibiotic    6. DVT proph: Lovenox    It was my pleasure to evaluate Komal Crawford today.  Please call with questions.    Michael Eisenberg MD          This note is created with the assistance of a speech recognition program.  While intending to generate a document that actually reflects the content of the visit, the document can still have some errors including those of syntax and sound a like substitutions which may escape proof reading.  In such instances, actual meaning can be extrapolated by contextual diversion

## 2024-03-08 NOTE — PLAN OF CARE
Problem: Discharge Planning  Goal: Discharge to home or other facility with appropriate resources  3/8/2024 0317 by Sarah Card RN  Outcome: Progressing     Problem: Pain  Goal: Verbalizes/displays adequate comfort level or baseline comfort level  3/8/2024 0317 by Sarah Card RN  Outcome: Progressing     Problem: Safety - Adult  Goal: Free from fall injury  3/8/2024 0317 by Sarah Card RN  Outcome: Progressing     Problem: Skin/Tissue Integrity  Goal: Absence of new skin breakdown  Description: 1.  Monitor for areas of redness and/or skin breakdown  2.  Assess vascular access sites hourly  3.  Every 4-6 hours minimum:  Change oxygen saturation probe site  4.  Every 4-6 hours:  If on nasal continuous positive airway pressure, respiratory therapy assess nares and determine need for appliance change or resting period.  3/8/2024 0317 by Sarah Card RN  Outcome: Progressing     Problem: Chronic Conditions and Co-morbidities  Goal: Patient's chronic conditions and co-morbidity symptoms are monitored and maintained or improved  3/8/2024 0317 by Sarah Card RN  Outcome: Progressing

## 2024-03-08 NOTE — ANESTHESIA POSTPROCEDURE EVALUATION
Department of Anesthesiology  Postprocedure Note    Patient: Komal Crawford  MRN: [id number]  YOB: 1940  Date of evaluation: 3/8/2024    Procedure Summary       Date: 03/08/24 Room / Location: [address]    Anesthesia Start: 1202 Anesthesia Stop: 1317    Procedure: KNEE INCISION AND DRAINAGE WITH POSSIBLE WOUND VAC APPLICATION LEFT (Left: Knee) Diagnosis:       Open wound of left knee, initial encounter      (Open wound of left knee, initial encounter [S81.002A])    Surgeons: Damian Keyes MD Responsible Provider: Li Acuña MD    Anesthesia Type: General ASA Status: 3            Anesthesia Type: General    Lelo Phase I: Lelo Score: 7    Lelo Phase II:      Anesthesia Post Evaluation    Comments: POST- ANESTHESIA EVALUATION       Pt Name: Komal Crawford  MRN: [id number]  YOB: 1940  Date of evaluation: 3/8/2024  Time:  2:51 PM      BP (!) 151/75   Pulse 87   Temp 97.9 °F (36.6 °C) (Infrared)   Resp 24   Ht 1.473 m (4' 10\")   Wt 73 kg (161 lb)   SpO2 97%   BMI 33.65 kg/m²      Consciousness Level  Awake  Cardiopulmonary Status  Stable  Pain Adequately Treated YES  Nausea / Vomiting  NO  Adequate Hydration  YES  Anesthesia Related Complications NONE      Electronically signed by Seble Menendez MD on 3/8/2024 at 2:51 PM      No notable events documented.

## 2024-03-08 NOTE — PLAN OF CARE
Problem: Discharge Planning  Goal: Discharge to home or other facility with appropriate resources  Outcome: Not Progressing     Problem: Pain  Goal: Verbalizes/displays adequate comfort level or baseline comfort level  Outcome: Not Progressing     Problem: Safety - Adult  Goal: Free from fall injury  Outcome: Not Progressing     Problem: Skin/Tissue Integrity  Goal: Absence of new skin breakdown  Description: 1.  Monitor for areas of redness and/or skin breakdown  2.  Assess vascular access sites hourly  3.  Every 4-6 hours minimum:  Change oxygen saturation probe site  4.  Every 4-6 hours:  If on nasal continuous positive airway pressure, respiratory therapy assess nares and determine need for appliance change or resting period.  Outcome: Not Progressing     Problem: Chronic Conditions and Co-morbidities  Goal: Patient's chronic conditions and co-morbidity symptoms are monitored and maintained or improved  Outcome: Not Progressing     Problem: Discharge Planning  Goal: Discharge to home or other facility with appropriate resources  Outcome: Not Progressing     Problem: Pain  Goal: Verbalizes/displays adequate comfort level or baseline comfort level  Outcome: Not Progressing     Problem: Safety - Adult  Goal: Free from fall injury  Outcome: Not Progressing     Problem: Skin/Tissue Integrity  Goal: Absence of new skin breakdown  Description: 1.  Monitor for areas of redness and/or skin breakdown  2.  Assess vascular access sites hourly  3.  Every 4-6 hours minimum:  Change oxygen saturation probe site  4.  Every 4-6 hours:  If on nasal continuous positive airway pressure, respiratory therapy assess nares and determine need for appliance change or resting period.  Outcome: Not Progressing     Problem: Chronic Conditions and Co-morbidities  Goal: Patient's chronic conditions and co-morbidity symptoms are monitored and maintained or improved  Outcome: Not Progressing

## 2024-03-08 NOTE — PROGRESS NOTES
[facility]   IN-PATIENT SERVICE   [facility]    Progress note            Date:   3/8/2024  Patient name:  Komal Crawford  Date of admission:  3/6/2024  3:17 PM  MRN:   [id number]  Account:  [id number]  YOB: 1940  PCP:    Sabiha Bedolla MD  Room:   [facility]  Code Status:    Full Code    Chief Complaint:     Chief Complaint   Patient presents with    Wound Infection     Left knee redness and warmth, total knee done in January at [place]       History Obtained From:     patient    History of Present Illness:     The patient is a 83 y.o.  Non- / non  female who presents with Wound Infection (Left knee redness and warmth, total knee done in January at [facility] [place])   and she is admitted to the hospital for the management of left lower extremity cellulitis.    83-year-old female with past medical history significant for diabetes mellitus type 2, TIA, GERD, Winters's esophagus, chronic ITP presents to the hospital with concerns for left lower extremity edema and swelling that started 1-1/2 weeks back.  The patient reports that she underwent left ISHAN 1/20/2024, left total knee arthroplasty in January.  She was doing well postoperatively however for the past 1-1/2 weeks she has noted an opening in the anterior aspect of the left knee, with serosanguineous discharge, no reported puslike drainage, reports worsening erythema and swelling for the past 1-1/2-week.      Hematuria  Has neurogenic bladder, has had a Winters since the past 3 months now.  Reports hematuria.  Winters exchanged in the ER.    Otherwise denies GI concerns, cardiac or respiratory concerns    3/7/2024  Reports pain and swelling has improved significantly, reports redness is improving as well.      Past Medical History:     Past Medical History:   Diagnosis Date    Age-related cognitive decline     Ankle pain     Left ankle fracture in ortho boat.    Anxiety     B12 deficiency     Winters esophagus      UNIT/ML injection pen Inject 10 Units into the skin nightly 1/9/24   Sakshi Johnson MD   Insulin Pen Needle ([employer] PEN NEEDLES 29G) 29G X 12MM MISC 1 each by Does not apply route daily 1/9/24   Sakshi Johnson MD   atorvastatin (LIPITOR) 10 MG tablet Take 1 tablet by mouth 4/1/23   Ana Carrington MD   primidone (MYSOLINE) 50 MG tablet Take 1 tablet by mouth 2 times daily 10/26/23 7/22/24  Sabiha Bedolla MD   glipiZIDE (GLUCOTROL) 5 MG tablet Takes 1.5 tabs in AM and 0.5 tab in PM 8/9/23   Ana Carrington MD   esomeprazole (NEXIUM) 40 MG delayed release capsule Take 1 capsule by mouth every morning (before breakfast) 4/17/23   Sabiha Bedolla MD   rOPINIRole (REQUIP) 4 MG tablet Take 1 tablet by mouth nightly 1/23/23   Sabiha Bedolla MD   clopidogrel (PLAVIX) 75 MG tablet Take 1 tablet by mouth daily 8/5/22   Ana Carrington MD   acetaminophen (TYLENOL) 500 MG tablet Take 2 tablets by mouth every 6 hours as needed for Pain 3/20/22   Delta Teixeira MD   Continuous Blood Gluc Sensor (FREESTYLE DAVIS 14 DAY SENSOR) [facility]  2/17/20   Ana Carrington MD   Continuous Blood Gluc  (FREESTYLE DAVIS 14 DAY READER) [facility]  12/9/19   Ana Carrington MD   CRANBERRY PO Take by mouth 2 times daily    Ana Carrington MD        Allergies:     Iodides, Iodinated contrast media, Povidone-iodine, Sulfa antibiotics, Betadine [povidone iodine], Cephalexin, Cephalexin, Cozaar [losartan], Cymbalta [duloxetine hcl], Demerol, Doxycycline, Duloxetine, Meperidine, Morphine, Penicillins, Zithromax [azithromycin], Ciprofloxacin, Clindamycin/lincomycin, Etodolac, Fesoterodine, Fluorescein, Iodine, Lisinopril, Penicillin g, Toviaz [fesoterodine fumarate er], Clonazepam, and Metformin and related    Social History:     Tobacco:    reports that she quit smoking about 41 years ago. Her smoking use included cigarettes. She started smoking about 71 years ago. She has a 15.0 pack-year smoking  ANISOCYTOSIS PRESENT        Imaging/Diagnostics:    Reviewed    Assessment :      Primary Problem  Sepsis (HCC)    Active Hospital Problems    Diagnosis Date Noted    Severe sepsis (HCC) [A41.9, R65.20] 03/07/2024    Cellulitis of left lower extremity [L03.116] 03/07/2024    Sepsis (HCC) [A41.9] 03/06/2024       Plan:     Patient status Admit as inpatient in the  Progressive Unit/Step down    Left lower extremity cellulitis, improving.  ID on board, orthopedics on board, currently on IV vancomycin and meropenem, plans for wound debridement as per orthopedics.  Ultrasound lower extremities pending  CAUTI--present on admission, Winters exchange 3/6/2024.  Urology consulted.  Renal ultrasound ordered, await read.  Will need appointment set up to follow-up with urology before she leaves the hospital  Sepsis, fever, tachycardia, leukocytosis secondary to above.  Now resolved  Neurogenic bladder, with a Winters  Diabetes mellitus type II, well-controlled.  Hemoglobin A1c 6.2.  Continue current regimen  Chronic normocytic normochromic anemia, stable  History of CVA  S/p total left hip, left distal femur, replacement in January.  Also had right knee replacement in November.    DVT prophylaxis, will hold off for now given the patient has hematuria and serosanguineous oozing from the opening in the left knee.  Consider resumption of the surgery      3/8  Patient seen and examined  Is been afebrile vital stable  Go on IV meropenem and vancomycin  Wound cultures pending  Urine culture growing Candida and Pseudomonas  ing for left knee I&D and possible wound VAC placement today  Thrombocytopenia platelet count dropped to 107, does have history of chronic ITP, will closely monitor  Doppler lower extremity negative for DVT  Renal ultrasound did not show any hydronephrosis  Patient requesting PMNR consult which has been placed, does not does not Want to go back to facility    Consultations:   PHARMACY TO DOSE VANCOMYCIN  PHARMACY TO  DOSE VANCOMYCIN  IP CONSULT TO INFECTIOUS DISEASES  IP CONSULT TO ORTHOPEDIC SURGERY  IP CONSULT TO UROLOGY  IP CONSULT TO PHYSICAL MEDICINE REHAB    Patient is admitted as inpatient status because of co-morbidities listed above, severity of signs and symptoms as outlined, requirement for current medical therapies and most importantly because of direct risk to patient if care not provided in a hospital setting.    Mya Melendez MD  3/8/2024  10:30 AM    Copy sent to Sabiha Christina MD    Please note that this chart was generated using voice recognition Dragon dictation software.  Although every effort was made to ensure the accuracy of this automated transcription, some errors in transcription may have occurred.

## 2024-03-08 NOTE — ANESTHESIA POSTPROCEDURE EVALUATION
Department of Anesthesiology  Postprocedure Note    Patient: Komal Crawford  MRN: [id number]  YOB: 1940  Date of evaluation: 3/8/2024    Procedure Summary       Date: 03/08/24 Room / Location: [address]    Anesthesia Start: 1202 Anesthesia Stop: 1317    Procedure: KNEE INCISION AND DRAINAGE WITH POSSIBLE WOUND VAC APPLICATION LEFT (Left: Knee) Diagnosis:       Open wound of left knee, initial encounter      (Open wound of left knee, initial encounter [S81.002A])    Surgeons: Damian Keyes MD Responsible Provider: Li Acuña MD    Anesthesia Type: General ASA Status: 3            Anesthesia Type: General    Lelo Phase I: Lelo Score: 7    Lelo Phase II:      Anesthesia Post Evaluation    Comments: POST- ANESTHESIA EVALUATION       Pt Name: Komal Crawford  MRN: [id number]  YOB: 1940  Date of evaluation: 3/8/2024  Time:  2:50 PM      BP (!) 151/75   Pulse 87   Temp 97.9 °F (36.6 °C) (Infrared)   Resp 24   Ht 1.473 m (4' 10\")   Wt 73 kg (161 lb)   SpO2 97%   BMI 33.65 kg/m²      Consciousness Level  Awake  Cardiopulmonary Status  Stable  Pain Adequately Treated YES  Nausea / Vomiting  NO  Adequate Hydration  YES  Anesthesia Related Complications NONE      Electronically signed by Li Acuña MD on 3/8/2024 at 2:50 PM           No notable events documented.

## 2024-03-08 NOTE — OP NOTE
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       Procedure open irrigation debridement left OSS total knee prosthesis with irrigation with Bactisure and application of 2 g vancomycin powder and wound VAC application    Surgeon(s):  Damian Keyes MD    Assistant:   Abel Mayorga CST    Anesthesia: General    Estimated Blood Loss (mL): less than 50     Complications: None    Specimens:   ID Type Source Tests Collected by Time Destination   1 : wound abcess superficial left leg Swab Leg CULTURE, ANAEROBIC AND AEROBIC Damian Keyes MD 3/8/2024 1250    2 : wound abcess deep left leg Swab Leg CULTURE, ANAEROBIC AND AEROBIC Damian Keyes MD 3/8/2024 1251        Implants:  * No implants in log *      Drains:   Urinary Catheter 03/06/24 (Active)   Site Assessment No urethral drainage 03/08/24 0954   Urine Color Caitlyn 03/08/24 0954   Urine Appearance Cloudy 03/08/24 0954   Urine Odor Other (Comment) 03/07/24 1734   Collection Container Standard 03/08/24 0954   Securement Method Securing device (Describe) 03/08/24 0954   Catheter Care  Soap and water 03/08/24 0600   Catheter Best Practices  Drainage tube clipped to bed;Catheter secured to thigh;Tamper seal intact;Bag below bladder;Bag not on floor;Lack of dependent loop in tubing;Drainage bag less than half full 03/08/24 0954   Status Draining 03/08/24 0954   Output (mL) 200 mL 03/08/24 0839       [REMOVED] Urinary Catheter 01/21/24 Winters (Removed)       [REMOVED] Urinary Catheter 01/27/24 [address] (Removed)   $ Urethral catheter insertion $ Not inserted for procedure 02/11/24 0800   Catheter Indications Urinary retention (acute or chronic), continuous bladder irrigation or bladder outlet obstruction 02/12/24  0641   Site Assessment [place] 02/12/24 0641   Urine Color Caitlyn 02/12/24 0641   Urine Appearance Clear 02/12/24 0641   Urine Odor Malodorous 02/12/24 0641   Collection Container Standard 02/12/24 0641   Securement Method Tape 02/12/24 0641   Catheter Care  Soap and water 02/11/24 2100   Catheter Best Practices  Drainage bag less than half full;Drainage tube clipped to bed;Catheter secured to thigh;Tamper seal intact;Bag below bladder;Bag not on floor;Lack of dependent loop in tubing 02/12/24 0641   Status Draining 02/12/24 0641   Output (mL) 850 mL 02/12/24 0641       Findings: Obvious superficial and deep infection of the prosthesis    Deep and superficial cultures taken        Detailed Description of Procedure:   Patient is a 83-year-old female who sustained a fracture hip this past January it was noted during the course of the total of arthroplasty that she had an unstable knee and at that time was recognized the patient with a fall and also sustained a severely displaced periprosthetic total knee fracture.  Several days after this patient underwent a revision total knee arthroplasty with a antibiotic impregnated cemented stemmed OSS hinged prosthesis.  Patient is initial postoperative course was unremarkable was actually doing very well with ambulation and getting around.  She was actually just seen last week in the office noted doing well.  2 days ago the patient developed cellulitis in the lower extremity was noted at that time that she had a small drainage area from her incision patient was seen by me yesterday and and noted to have the redness and slight drainage and warmth to her knee when her lab counts were elevated with CRP.  Was felt that urgent irrigation and debridement was indicated in this case.  We are unfortunately unable to obtain the modular components to his OSS prosthesis as this is not ordinarily available and therefore felt that we would not exchange modular components at this time    Patient

## 2024-03-08 NOTE — PROGRESS NOTES
[facility] -   Infectious diseases evaluation  admission date 3/6/2024    reason for consultation:   Left leg cellulitis    Impression :   Current:  UTI  Left  knee postoperative wound infection /cellulitis  Sepsis  Chronic Winters catheter/changed on admission  Status post left total hip arthroplasty/displaced periprosthetic femur fracture status post left total knee arthroplasty revision 1/23/24  History of ITP  CVA  Diabetes mellitus  Hypertension  Hyperlipidemia  Multiple antibiotics allergy  History of multidrug-resistant organism   Cephalosporin, sulfa penicillin, Cipro, doxycycline and clindamycin allergy      HENCE:   Continue IV vancomycin  IV meropenem  Left knee I&D with wound VAC application planned by orthopedic surgery  Follow cultures and adjust antibiotics as needed  Pansensitive Pseudomonas aeruginosa and Candida albicans growth on urine culture from 3/1/2024  Lower extremity venous Doppler  Follow CBC and renal function    Infection Control Recommendations   [place] Precautions  Contact Isolation       Antimicrobial Stewardship Recommendations   Simplification of therapy  Targeted therapy      History of Present Illness:   Initial history:  Komal Crawford is a 83 y.o.-year-old female presented to hospital with left knee incision wound with swelling, warmth and pain associated with fever with a temperature max of 102.6.  Symptoms moderate to severe, no alleviating or aggravating factors.  She denied cough, denied nausea or vomiting, no diarrhea, no other complaints.  Initial WBC 15.1, lactic acid 2.6  Urinalysis cloudy, moderate leukocyte esterase, too numerous to count WBC  She also complaining of cloudy urine, Winters catheter was changed on admission  Left humerus CT 3/6/2024 showed  IMPRESSION:  1. Extensive subcutaneous edema throughout the left thigh and proximal aspect  of the left lower leg compatible with lymphedema or cellulitis.  No abscess  or

## 2024-03-08 NOTE — PROGRESS NOTES
I discussion with the patient's family after the procedure stating that the patient's knee was obviously infected.  Patient does have an antibiotic impregnated spacer prosthesis with a cemented stems.  Removing this in the future he would be short of almost of having an amputation and as such patient will benefit from IV antibiotics in the short-term and most likely chronic antibiotic suppression for the remainder of her lifetime.  Family is understanding of this.  They are also understanding of the nature of the wound VAC and how this will be changed out over time with sponge changes and until eventually wound closure.

## 2024-03-09 ENCOUNTER — APPOINTMENT (OUTPATIENT)
Dept: GENERAL RADIOLOGY | Age: 84
DRG: 981 | End: 2024-03-09
Payer: MEDICARE

## 2024-03-09 PROBLEM — Z88.1 ALLERGY TO MULTIPLE ANTIBIOTICS: Status: ACTIVE | Noted: 2024-03-09

## 2024-03-09 PROBLEM — S81.002A OPEN WOUND OF LEFT KNEE: Status: ACTIVE | Noted: 2024-03-09

## 2024-03-09 PROBLEM — N30.00 ACUTE CYSTITIS WITHOUT HEMATURIA: Status: ACTIVE | Noted: 2024-03-09

## 2024-03-09 LAB
BNP SERPL-MCNC: 5704 PG/ML
EKG ATRIAL RATE: 104 BPM
EKG P AXIS: 22 DEGREES
EKG P-R INTERVAL: 126 MS
EKG Q-T INTERVAL: 334 MS
EKG QRS DURATION: 82 MS
EKG QTC CALCULATION (BAZETT): 439 MS
EKG R AXIS: -17 DEGREES
EKG T AXIS: 60 DEGREES
EKG VENTRICULAR RATE: 104 BPM
GLUCOSE BLD-MCNC: 141 MG/DL (ref 65–105)
GLUCOSE BLD-MCNC: 175 MG/DL (ref 65–105)
GLUCOSE BLD-MCNC: 182 MG/DL (ref 65–105)
GLUCOSE BLD-MCNC: 218 MG/DL (ref 65–105)
MICROORGANISM SPEC CULT: ABNORMAL
MICROORGANISM SPEC CULT: ABNORMAL
MICROORGANISM/AGENT SPEC: ABNORMAL
MICROORGANISM/AGENT SPEC: ABNORMAL
SPECIMEN DESCRIPTION: ABNORMAL

## 2024-03-09 PROCEDURE — 6360000002 HC RX W HCPCS: Performed by: ORTHOPAEDIC SURGERY

## 2024-03-09 PROCEDURE — 2060000000 HC ICU INTERMEDIATE R&B

## 2024-03-09 PROCEDURE — 6370000000 HC RX 637 (ALT 250 FOR IP): Performed by: ORTHOPAEDIC SURGERY

## 2024-03-09 PROCEDURE — 83880 ASSAY OF NATRIURETIC PEPTIDE: CPT

## 2024-03-09 PROCEDURE — 93010 ELECTROCARDIOGRAM REPORT: CPT | Performed by: INTERNAL MEDICINE

## 2024-03-09 PROCEDURE — 99233 SBSQ HOSP IP/OBS HIGH 50: CPT | Performed by: INTERNAL MEDICINE

## 2024-03-09 PROCEDURE — 36415 COLL VENOUS BLD VENIPUNCTURE: CPT

## 2024-03-09 PROCEDURE — 2580000003 HC RX 258: Performed by: ORTHOPAEDIC SURGERY

## 2024-03-09 PROCEDURE — 82947 ASSAY GLUCOSE BLOOD QUANT: CPT

## 2024-03-09 PROCEDURE — 6360000002 HC RX W HCPCS: Performed by: INTERNAL MEDICINE

## 2024-03-09 PROCEDURE — 71045 X-RAY EXAM CHEST 1 VIEW: CPT

## 2024-03-09 PROCEDURE — 99232 SBSQ HOSP IP/OBS MODERATE 35: CPT | Performed by: NURSE PRACTITIONER

## 2024-03-09 RX ORDER — FUROSEMIDE 10 MG/ML
20 INJECTION INTRAMUSCULAR; INTRAVENOUS ONCE
Status: COMPLETED | OUTPATIENT
Start: 2024-03-09 | End: 2024-03-09

## 2024-03-09 RX ADMIN — HYDROCODONE BITARTRATE AND ACETAMINOPHEN 1 TABLET: 7.5; 325 TABLET ORAL at 17:21

## 2024-03-09 RX ADMIN — ENOXAPARIN SODIUM 40 MG: 100 INJECTION SUBCUTANEOUS at 08:31

## 2024-03-09 RX ADMIN — VANCOMYCIN HYDROCHLORIDE 1500 MG: 1.5 INJECTION, POWDER, LYOPHILIZED, FOR SOLUTION INTRAVENOUS at 17:55

## 2024-03-09 RX ADMIN — ROPINIROLE HYDROCHLORIDE 4 MG: 1 TABLET, FILM COATED ORAL at 21:48

## 2024-03-09 RX ADMIN — INSULIN GLARGINE 10 UNITS: 100 INJECTION, SOLUTION SUBCUTANEOUS at 21:47

## 2024-03-09 RX ADMIN — PRIMIDONE 50 MG: 50 TABLET ORAL at 21:48

## 2024-03-09 RX ADMIN — MEROPENEM 1000 MG: 1 INJECTION, POWDER, FOR SOLUTION INTRAVENOUS at 14:40

## 2024-03-09 RX ADMIN — FUROSEMIDE 20 MG: 10 INJECTION, SOLUTION INTRAMUSCULAR; INTRAVENOUS at 14:37

## 2024-03-09 RX ADMIN — SODIUM CHLORIDE, PRESERVATIVE FREE 10 ML: 5 INJECTION INTRAVENOUS at 08:31

## 2024-03-09 RX ADMIN — MEROPENEM 1000 MG: 1 INJECTION, POWDER, FOR SOLUTION INTRAVENOUS at 07:37

## 2024-03-09 RX ADMIN — PANTOPRAZOLE SODIUM 40 MG: 40 TABLET, DELAYED RELEASE ORAL at 07:36

## 2024-03-09 RX ADMIN — HYDROCODONE BITARTRATE AND ACETAMINOPHEN 1 TABLET: 7.5; 325 TABLET ORAL at 10:37

## 2024-03-09 RX ADMIN — PRIMIDONE 50 MG: 50 TABLET ORAL at 08:31

## 2024-03-09 RX ADMIN — ATORVASTATIN CALCIUM 10 MG: 10 TABLET, FILM COATED ORAL at 08:31

## 2024-03-09 RX ADMIN — RANOLAZINE 500 MG: 500 TABLET, EXTENDED RELEASE ORAL at 21:49

## 2024-03-09 RX ADMIN — CLOPIDOGREL BISULFATE 75 MG: 75 TABLET ORAL at 08:31

## 2024-03-09 RX ADMIN — MEROPENEM 1000 MG: 1 INJECTION, POWDER, FOR SOLUTION INTRAVENOUS at 21:52

## 2024-03-09 RX ADMIN — SODIUM CHLORIDE, PRESERVATIVE FREE 10 ML: 5 INJECTION INTRAVENOUS at 21:52

## 2024-03-09 RX ADMIN — RANOLAZINE 500 MG: 500 TABLET, EXTENDED RELEASE ORAL at 08:31

## 2024-03-09 ASSESSMENT — PAIN DESCRIPTION - DESCRIPTORS
DESCRIPTORS: ACHING
DESCRIPTORS: ACHING
DESCRIPTORS: STABBING

## 2024-03-09 ASSESSMENT — PAIN DESCRIPTION - ORIENTATION
ORIENTATION: LEFT

## 2024-03-09 ASSESSMENT — PAIN DESCRIPTION - LOCATION
LOCATION: LEG

## 2024-03-09 ASSESSMENT — PAIN - FUNCTIONAL ASSESSMENT: PAIN_FUNCTIONAL_ASSESSMENT: PREVENTS OR INTERFERES WITH MANY ACTIVE NOT PASSIVE ACTIVITIES

## 2024-03-09 ASSESSMENT — PAIN SCALES - GENERAL
PAINLEVEL_OUTOF10: 4
PAINLEVEL_OUTOF10: 4
PAINLEVEL_OUTOF10: 2
PAINLEVEL_OUTOF10: 4
PAINLEVEL_OUTOF10: 10

## 2024-03-09 NOTE — ANESTHESIA POSTPROCEDURE EVALUATION
Department of Anesthesiology  Postprocedure Note    Patient: Komal Crawford  MRN: [id number]  YOB: 1940  Date of evaluation: 3/9/2024    Procedure Summary       Date: 03/08/24 Room / Location: [address]    Anesthesia Start: 1202 Anesthesia Stop: 1317    Procedure: KNEE INCISION AND DRAINAGE WITH WOUND VAC APPLICATION LEFT (Left: Knee) Diagnosis:       Open wound of left knee, initial encounter      (Open wound of left knee, initial encounter [S81.002A])    Surgeons: Damian Keyes MD Responsible Provider: Li Acuña MD    Anesthesia Type: General ASA Status: 3            Anesthesia Type: General    Lelo Phase I: Lelo Score: 7    Lelo Phase II:      Anesthesia Post Evaluation    Comments: POD #1 Patient seen sitting up in bed about to have lunch. Denied any anesthesia related issues.    No notable events documented.

## 2024-03-09 NOTE — PLAN OF CARE
Problem: Discharge Planning  Goal: Discharge to home or other facility with appropriate resources  3/9/2024 0451 by Sarah Card RN  Outcome: Progressing     Problem: Pain  Goal: Verbalizes/displays adequate comfort level or baseline comfort level  3/9/2024 0451 by Sarah Card RN  Outcome: Progressing     Problem: Safety - Adult  Goal: Free from fall injury  3/9/2024 0451 by Sarah Card RN  Outcome: Progressing     Problem: Skin/Tissue Integrity  Goal: Absence of new skin breakdown  Description: 1.  Monitor for areas of redness and/or skin breakdown  2.  Assess vascular access sites hourly  3.  Every 4-6 hours minimum:  Change oxygen saturation probe site  4.  Every 4-6 hours:  If on nasal continuous positive airway pressure, respiratory therapy assess nares and determine need for appliance change or resting period.  3/9/2024 0451 by Sarah Card RN  Outcome: Progressing     Problem: Chronic Conditions and Co-morbidities  Goal: Patient's chronic conditions and co-morbidity symptoms are monitored and maintained or improved  3/9/2024 0451 by Sarah Card RN  Outcome: Progressing

## 2024-03-09 NOTE — PLAN OF CARE
Problem: Discharge Planning  Goal: Discharge to home or other facility with appropriate resources  3/9/2024 1600 by Ana Maria Ballard, RN  Outcome: Progressing     Problem: Pain  Goal: Verbalizes/displays adequate comfort level or baseline comfort level  3/9/2024 1600 by Ana Maria Ballard, RN  Outcome: Progressing     Problem: Safety - Adult  Goal: Free from fall injury  3/9/2024 1600 by Ana Maria Ballard, RN  Outcome: Progressing     Problem: Skin/Tissue Integrity  Goal: Absence of new skin breakdown  Description: 1.  Monitor for areas of redness and/or skin breakdown  2.  Assess vascular access sites hourly  3.  Every 4-6 hours minimum:  Change oxygen saturation probe site  4.  Every 4-6 hours:  If on nasal continuous positive airway pressure, respiratory therapy assess nares and determine need for appliance change or resting period.  3/9/2024 1600 by Ana Maria Ballard, RN  Outcome: Progressing

## 2024-03-09 NOTE — PROGRESS NOTES
No events O/N. In some paint this AM.     Blood pressure 127/65, pulse 70, temperature 97.3 °F (36.3 °C), temperature source Oral, resp. rate 16, height 1.473 m (4' 10\"), weight 73 kg (161 lb), SpO2 97 %, not currently breastfeeding.  Left knee dressing is clean dry and intact. Wound vac in place and functioning. 1+ pedal pulses with brisk capillary refill in toes. Sensation grossly intact. Able to DF/PF her toes and foot.     Impression and plan: 84 yo lady with left knee periprosthetic joint infection sp I and D POD 1  - Cultures with Staph aureus growth, sensitivity pending. Continue vancomycin. Will need PICC line and ID consult  - Mobilize with PT. WBAT LLE  - DVT ppx

## 2024-03-09 NOTE — PROGRESS NOTES
[facility]   IN-PATIENT SERVICE   [facility]    Progress note            Date:   3/9/2024  Patient name:  Komal Crawford  Date of admission:  3/6/2024  3:17 PM  MRN:   [id number]  Account:  [id number]  YOB: 1940  PCP:    Sabiha Bedolla MD  Room:   [facility]  Code Status:    Full Code    Chief Complaint:     Chief Complaint   Patient presents with    Wound Infection     Left knee redness and warmth, total knee done in January at [place]       History Obtained From:     patient    History of Present Illness:     The patient is a 83 y.o.  Non- / non  female who presents with Wound Infection (Left knee redness and warmth, total knee done in January at [place])   and she is admitted to the hospital for the management of left lower extremity cellulitis.    83-year-old female with past medical history significant for diabetes mellitus type 2, TIA, GERD, Winters's esophagus, chronic ITP presents to the hospital with concerns for left lower extremity edema and swelling that started 1-1/2 weeks back.  The patient reports that she underwent left ISHAN 1/20/2024, left total knee arthroplasty in January.  She was doing well postoperatively however for the past 1-1/2 weeks she has noted an opening in the anterior aspect of the left knee, with serosanguineous discharge, no reported puslike drainage, reports worsening erythema and swelling for the past 1-1/2-week.      Hematuria  Has neurogenic bladder, has had a Winters since the past 3 months now.  Reports hematuria.  Winters exchanged in the ER.    Otherwise denies GI concerns, cardiac or respiratory concerns    3/7/2024  Reports pain and swelling has improved significantly, reports redness is improving as well.      Past Medical History:     Past Medical History:   Diagnosis Date    Age-related cognitive decline     Ankle pain     Left ankle fracture in ortho boat.    Anxiety     B12 deficiency     Winters esophagus      CVA  S/p total left hip, left distal femur, replacement in January.  Also had right knee replacement in November.    DVT prophylaxis, will hold off for now given the patient has hematuria and serosanguineous oozing from the opening in the left knee.  Consider resumption of the surgery      3/8  Patient seen and examined  Is been afebrile vital stable  Go on IV meropenem and vancomycin  Wound cultures pending  Urine culture growing Candida and Pseudomonas  ing for left knee I&D and possible wound VAC placement today  Thrombocytopenia platelet count dropped to 107, does have history of chronic ITP, will closely monitor  Doppler lower extremity negative for DVT  Renal ultrasound did not show any hydronephrosis  Patient requesting PMNR consult which has been placed, does not does not Want to go back to facility    3/9  Patient seen and examined  Patient is s/p IM D of the left knee and wound VAC placement  Patient states that she is feeling okay placed on 2 L of oxygen nasal cannula  Has pedal edema worse on the left which is secondary to postop and infection  Check BNP x-ray, give 1 dose of 20 IV Lasix  Labs pending from this morning has chronic thrombocytopenia secondary to ITP him platelet count was 107 yesterday monitor CBC today  Afebrile    Meropenem    On Vanco  Intraoperative cultures pending.  Consultations:   PHARMACY TO DOSE VANCOMYCIN  PHARMACY TO DOSE VANCOMYCIN  IP CONSULT TO INFECTIOUS DISEASES  IP CONSULT TO ORTHOPEDIC SURGERY  IP CONSULT TO UROLOGY  IP CONSULT TO PHYSICAL MEDICINE REHAB    Patient is admitted as inpatient status because of co-morbidities listed above, severity of signs and symptoms as outlined, requirement for current medical therapies and most importantly because of direct risk to patient if care not provided in a hospital setting.    Mya Melendez MD  3/9/2024  9:50 AM    Copy sent to Sabiha Christina MD    Please note that this chart was generated using voice recognition Dragon

## 2024-03-09 NOTE — PROGRESS NOTES
[facility] -   Infectious diseases evaluation  admission date 3/6/2024    reason for consultation:   Left leg cellulitis    Impression :   Current:  UTI  Left  knee postoperative wound infection /cellulitis  3/8    S/p I & D with application of Vancomycin powder and wound vac.  Sepsis  Chronic Winters catheter/changed on admission  Status post left total hip arthroplasty/displaced periprosthetic femur fracture status post left total knee arthroplasty revision 1/23/24  History of ITP  CVA  Diabetes mellitus  Hypertension  Hyperlipidemia  Multiple antibiotics allergy  History of multidrug-resistant organism   Cephalosporin, sulfa penicillin, Cipro, doxycycline and clindamycin allergy      HENCE:   Continue IV vancomycin  IV meropenem  Follow CBC and renal function  Follow blood and wound cultures.  Supportive care.  Discussed with patient, RN.    Infection Control Recommendations   [place] Precautions  Contact Isolation       Antimicrobial Stewardship Recommendations   Simplification of therapy  Targeted therapy      History of Present Illness:   Initial history:  Komal Crawford is a 83 y.o.-year-old female presented to hospital with left knee incision wound with swelling, warmth and pain associated with fever with a temperature max of 102.6.  Symptoms moderate to severe, no alleviating or aggravating factors.  She denied cough, denied nausea or vomiting, no diarrhea, no other complaints.  Initial WBC 15.1, lactic acid 2.6  Urinalysis cloudy, moderate leukocyte esterase, too numerous to count WBC  She also complaining of cloudy urine, Winters catheter was changed on admission  Left humerus CT 3/6/2024 showed  IMPRESSION:  1. Extensive subcutaneous edema throughout the left thigh and proximal aspect  of the left lower leg compatible with lymphedema or cellulitis.  No abscess  or soft tissue gas.  2. No acute osseous abnormality.    Interval changes  3/9/2024  Macular degeneration of left eye 1980's    S/P laser treatment    Nausea and vomiting     Neuropathy     Osteoarthritis     Ringing in ears     Self-catheterizes urinary bladder     6-7 times daily    TIA (transient ischemic attack) 2000    x1    Tubular adenoma     colon polyps    Type II or unspecified type diabetes mellitus without mention of complication, not stated as uncontrolled     Urinary incontinence     frequent UTI s/p infection, surgical dilatation lead to incontinence    Wears dentures     full on top, partial on bottom    Wears glasses        Past Surgical  History:     Past Surgical History:   Procedure Laterality Date    APPENDECTOMY  1962    BLADDER SURGERY      bladder stimulator    BLADDER SUSPENSION  2002    multiple    CARDIAC CATHETERIZATION  2008    no stents    CARDIOVASCULAR STRESS TEST  12/2014    CATARACT REMOVAL WITH IMPLANT Left 11/07/2017    [facility]    CATARACT REMOVAL WITH IMPLANT Right 11/28/2017    [facility]    COLON SURGERY      colostomy reversal    COLONOSCOPY  04/07/2016    tubular adenoma x3; internal hemorrhoids    COLONOSCOPY N/A 11/06/2019    COLONOSCOPY DIAGNOSTIC performed by Eli Marinelli MD at [facility] ENDO    COLONOSCOPY  11/06/2019    COLOSTOMY  1986    bowel obstruction/ rupture from diverticular disease, reversal 3 mos later    CORONARY ANGIOPLASTY WITH STENT PLACEMENT  08/04/2022    CYSTOSCOPY  09/08/2017    W/ 200IU Botox     FRACTURE SURGERY Right 2011    hip    FRACTURE SURGERY Left 2001    WRIST    FRACTURE SURGERY Left 2013    ankle    HERNIA REPAIR      HIP SURGERY Right 11/6/2023    HIP HARDWARE REMOVAL - WITH DEEP HARDWARE REMOVAL 7.3 NABILA SCREW   X3 performed by Jon Bonner MD at [facility] OR    HYSTERECTOMY (CERVIX STATUS UNKNOWN)  1969    JOINT REPLACEMENT Bilateral 2000    BILAT KNEES    KNEE SURGERY Left 3/8/2024    KNEE INCISION AND DRAINAGE WITH WOUND VAC APPLICATION LEFT performed by Damian Keyes MD at [facility] OR    LUMBAR

## 2024-03-09 NOTE — CARE COORDINATION
ONGOING DISCHARGE PLAN:    Patient is alert and oriented x4.    Spoke with patient regarding discharge plan and patient confirms that plan is still to go to ARU if appropriate, if not second choice is [facility] and [facility] accept. PM&R consulted, awaiting postop PT/OT evaluation.     POD #1 I&D left knee with wound VAC placement (prior arthroplasty in January).    ID on board-IV vanco, IV merrem.    Will continue to follow for additional discharge needs.    If patient is discharged prior to next notation, then this note serves as note for discharge by case management.    Electronically signed by Nisreen Wells RN on 3/9/2024 at 3:18 PM

## 2024-03-09 NOTE — DISCHARGE INSTR - COC
Continuity of Care Form    Patient Name: Komal Crawford   :  1940  MRN:  [id number]    Admit date:  3/6/2024  Discharge date:  3/12/24    Code Status Order: Full Code   Advance Directives:   Advance Care Flowsheet Documentation       Date/Time Healthcare Directive Type of Healthcare Directive Copy in Chart Healthcare Agent Appointed Healthcare Agent's Name Healthcare Agent's Phone Number    [phone] Yes, patient has an advance directive for healthcare treatment Living will No, copy requested from medical records Spouse  yesiak --            Admitting Physician:  Bala Olivares MD  PCP: Sabiha Bedolla MD    Discharging Nurse: Katharine  Discharging Hospital Unit/Room#: 2084/2084-01  Discharging Unit Phone Number: [phone]    Emergency Contact:   Extended Emergency Contact Information  Primary Emergency Contact: Yesika Crawford  Address: [address] [place] [address]            [place] [id number] [facility]  Home Phone: [phone]  Work Phone: [phone]  Mobile Phone: [phone]  Relation: Spouse  Secondary Emergency Contact: An Ventura  Home Phone: [phone]  Mobile Phone: [phone]  Relation: Other    Past Surgical History:  Past Surgical History:   Procedure Laterality Date    APPENDECTOMY      BLADDER SURGERY      bladder stimulator    BLADDER SUSPENSION      multiple    CARDIAC CATHETERIZATION      no stents    CARDIOVASCULAR STRESS TEST  2014    CATARACT REMOVAL WITH IMPLANT Left 2017    [facility]    CATARACT REMOVAL WITH IMPLANT Right 2017    [facility]    COLON SURGERY      colostomy reversal    COLONOSCOPY  2016    tubular adenoma x3; internal hemorrhoids    COLONOSCOPY N/A 2019    COLONOSCOPY DIAGNOSTIC performed [facility] at [facility]    COLONOSCOPY  2019    COLOSTOMY  1986    bowel obstruction/ rupture from diverticular disease, reversal 3 mos later    CORONARY ANGIOPLASTY WITH STENT  Surgical 03/11/24 1619   Dressing Status Clean;Dry;Intact 03/11/24 1619   Wound Cleansed Irrigated with saline 03/11/24 1245   Dressing/Treatment Ace wrap 03/11/24 1619   Dressing Change Due 03/08/24 03/07/24 1942   Wound Length (cm) 18 cm 03/11/24 1245   Wound Width (cm) 5.1 cm 03/11/24 1245   Wound Depth (cm) 2 cm 03/11/24 1245   Wound Surface Area (cm^2) 91.8 cm^2 03/11/24 1245   Change in Wound Size % (l*w) -93054.5 03/11/24 1245   Wound Volume (cm^3) 183.6 cm^3 03/11/24 1245   Wound Assessment Other (Comment) 03/11/24 1619   Drainage Amount Moderate (25-50%) 03/11/24 1245   Drainage Description Serosanguinous 03/11/24 1245   Odor None 03/11/24 1245   [place]-wound Assessment Dry/flaky;Intact 03/11/24 1245   Margins Defined edges 03/11/24 1245   Number of days: 3     Left knee: Cleanse with saline, pat dry.  Apply hydrocolloid dressing to periwound.  Place white foam into undermined area on medial side of wound. Fill remainder of wound with black foam and cover with drape. Apply KCI vac at -150mmhg continuous suction. Change every MWF. If suction is lost, remove entire dressing and apply saline wet to dry dressing twice daily until wound vac can be reapplied.        Safety Concerns:     At Risk for Falls    Impairments/Disabilities:      None    Nutrition Therapy:  Current Nutrition Therapy:   - Oral Diet:  Carb Control 4 carbs/meal (1800kcals/day)    Routes of Feeding: Oral  Liquids: No Restrictions  Daily Fluid Restriction: no  Last Modified Barium Swallow with Video (Video Swallowing Test): not done    Treatments at the Time of Hospital Discharge:   Respiratory Treatments: n/a  Oxygen Therapy:  is not on home oxygen therapy.  Ventilator:    - No ventilator support    Rehab Therapies: Physical Therapy and Occupational Therapy  Weight Bearing Status/Restrictions: No weight bearing restrictions  Other Medical Equipment (for information only, NOT a DME order):  n/a  Other Treatments: Skilled Nursing assessment and

## 2024-03-10 LAB
ALBUMIN SERPL-MCNC: 2.7 G/DL (ref 3.5–5.2)
ALP SERPL-CCNC: 59 U/L (ref 35–104)
ALT SERPL-CCNC: 15 U/L (ref 5–33)
ANION GAP SERPL CALCULATED.3IONS-SCNC: 10 MMOL/L (ref 9–17)
AST SERPL-CCNC: 22 U/L
BASOPHILS # BLD: 0 K/UL (ref 0–0.2)
BASOPHILS NFR BLD: 0 % (ref 0–2)
BILIRUB SERPL-MCNC: 0.3 MG/DL (ref 0.3–1.2)
BUN SERPL-MCNC: 22 MG/DL (ref 8–23)
CALCIUM SERPL-MCNC: 7.8 MG/DL (ref 8.6–10.4)
CHLORIDE SERPL-SCNC: 103 MMOL/L (ref 98–107)
CO2 SERPL-SCNC: 23 MMOL/L (ref 20–31)
CREAT SERPL-MCNC: 0.7 MG/DL (ref 0.5–0.9)
DATE LAST DOSE: NORMAL
EOSINOPHIL # BLD: 0.1 K/UL (ref 0–0.4)
EOSINOPHILS RELATIVE PERCENT: 1 % (ref 0–4)
ERYTHROCYTE [DISTWIDTH] IN BLOOD BY AUTOMATED COUNT: 17.3 % (ref 11.5–14.9)
GFR SERPL CREATININE-BSD FRML MDRD: >60 ML/MIN/1.73M2
GLUCOSE BLD-MCNC: 134 MG/DL (ref 65–105)
GLUCOSE BLD-MCNC: 163 MG/DL (ref 65–105)
GLUCOSE BLD-MCNC: 180 MG/DL (ref 65–105)
GLUCOSE BLD-MCNC: 195 MG/DL (ref 65–105)
GLUCOSE SERPL-MCNC: 151 MG/DL (ref 70–99)
HCT VFR BLD AUTO: 25.2 % (ref 36–46)
HGB BLD-MCNC: 8.5 G/DL (ref 12–16)
LYMPHOCYTES NFR BLD: 1.2 K/UL (ref 1–4.8)
LYMPHOCYTES RELATIVE PERCENT: 13 % (ref 24–44)
MCH RBC QN AUTO: 30.1 PG (ref 26–34)
MCHC RBC AUTO-ENTMCNC: 33.7 G/DL (ref 31–37)
MCV RBC AUTO: 89.3 FL (ref 80–100)
MICROORGANISM SPEC CULT: ABNORMAL
MICROORGANISM SPEC CULT: ABNORMAL
MICROORGANISM/AGENT SPEC: ABNORMAL
MICROORGANISM/AGENT SPEC: ABNORMAL
MONOCYTES NFR BLD: 0.8 K/UL (ref 0.1–1.3)
MONOCYTES NFR BLD: 8 % (ref 1–7)
NEUTROPHILS NFR BLD: 78 % (ref 36–66)
NEUTS SEG NFR BLD: 7.1 K/UL (ref 1.3–9.1)
PLATELET # BLD AUTO: 119 K/UL (ref 150–450)
PMV BLD AUTO: 12.2 FL (ref 6–12)
POTASSIUM SERPL-SCNC: 3.6 MMOL/L (ref 3.7–5.3)
PROT SERPL-MCNC: 5 G/DL (ref 6.4–8.3)
RBC # BLD AUTO: 2.82 M/UL (ref 4–5.2)
SERVICE CMNT-IMP: ABNORMAL
SODIUM SERPL-SCNC: 136 MMOL/L (ref 135–144)
SPECIMEN DESCRIPTION: ABNORMAL
TME LAST DOSE: 1755 H
VANCOMYCIN DOSE: 1500 MG
VANCOMYCIN SERPL-MCNC: 27.5 UG/ML
WBC OTHER # BLD: 9.2 K/UL (ref 3.5–11)

## 2024-03-10 PROCEDURE — 97162 PT EVAL MOD COMPLEX 30 MIN: CPT

## 2024-03-10 PROCEDURE — 80202 ASSAY OF VANCOMYCIN: CPT

## 2024-03-10 PROCEDURE — 99233 SBSQ HOSP IP/OBS HIGH 50: CPT | Performed by: INTERNAL MEDICINE

## 2024-03-10 PROCEDURE — 6360000002 HC RX W HCPCS: Performed by: ORTHOPAEDIC SURGERY

## 2024-03-10 PROCEDURE — 6370000000 HC RX 637 (ALT 250 FOR IP): Performed by: INTERNAL MEDICINE

## 2024-03-10 PROCEDURE — 6360000002 HC RX W HCPCS: Performed by: INTERNAL MEDICINE

## 2024-03-10 PROCEDURE — 99232 SBSQ HOSP IP/OBS MODERATE 35: CPT | Performed by: NURSE PRACTITIONER

## 2024-03-10 PROCEDURE — 82947 ASSAY GLUCOSE BLOOD QUANT: CPT

## 2024-03-10 PROCEDURE — 80053 COMPREHEN METABOLIC PANEL: CPT

## 2024-03-10 PROCEDURE — 2060000000 HC ICU INTERMEDIATE R&B

## 2024-03-10 PROCEDURE — 36415 COLL VENOUS BLD VENIPUNCTURE: CPT

## 2024-03-10 PROCEDURE — 85025 COMPLETE CBC W/AUTO DIFF WBC: CPT

## 2024-03-10 PROCEDURE — 6370000000 HC RX 637 (ALT 250 FOR IP): Performed by: ORTHOPAEDIC SURGERY

## 2024-03-10 PROCEDURE — 2580000003 HC RX 258: Performed by: ORTHOPAEDIC SURGERY

## 2024-03-10 PROCEDURE — 97530 THERAPEUTIC ACTIVITIES: CPT

## 2024-03-10 RX ORDER — FUROSEMIDE 10 MG/ML
20 INJECTION INTRAMUSCULAR; INTRAVENOUS DAILY
Status: DISCONTINUED | OUTPATIENT
Start: 2024-03-10 | End: 2024-03-12 | Stop reason: HOSPADM

## 2024-03-10 RX ORDER — ASPIRIN 81 MG/1
81 TABLET, CHEWABLE ORAL DAILY
Status: DISCONTINUED | OUTPATIENT
Start: 2024-03-10 | End: 2024-03-12 | Stop reason: HOSPADM

## 2024-03-10 RX ADMIN — HYDROCODONE BITARTRATE AND ACETAMINOPHEN 1 TABLET: 7.5; 325 TABLET ORAL at 22:00

## 2024-03-10 RX ADMIN — ATORVASTATIN CALCIUM 10 MG: 10 TABLET, FILM COATED ORAL at 08:09

## 2024-03-10 RX ADMIN — CLOPIDOGREL BISULFATE 75 MG: 75 TABLET ORAL at 08:09

## 2024-03-10 RX ADMIN — ENOXAPARIN SODIUM 40 MG: 100 INJECTION SUBCUTANEOUS at 08:09

## 2024-03-10 RX ADMIN — HYDROCODONE BITARTRATE AND ACETAMINOPHEN 1 TABLET: 7.5; 325 TABLET ORAL at 11:45

## 2024-03-10 RX ADMIN — RANOLAZINE 500 MG: 500 TABLET, EXTENDED RELEASE ORAL at 20:58

## 2024-03-10 RX ADMIN — SODIUM CHLORIDE, PRESERVATIVE FREE 10 ML: 5 INJECTION INTRAVENOUS at 21:01

## 2024-03-10 RX ADMIN — MEROPENEM 1000 MG: 1 INJECTION, POWDER, FOR SOLUTION INTRAVENOUS at 15:42

## 2024-03-10 RX ADMIN — INSULIN GLARGINE 10 UNITS: 100 INJECTION, SOLUTION SUBCUTANEOUS at 20:57

## 2024-03-10 RX ADMIN — ASPIRIN 81 MG: 81 TABLET, CHEWABLE ORAL at 15:54

## 2024-03-10 RX ADMIN — PRIMIDONE 50 MG: 50 TABLET ORAL at 08:09

## 2024-03-10 RX ADMIN — PANTOPRAZOLE SODIUM 40 MG: 40 TABLET, DELAYED RELEASE ORAL at 05:10

## 2024-03-10 RX ADMIN — HYDROCODONE BITARTRATE AND ACETAMINOPHEN 1 TABLET: 7.5; 325 TABLET ORAL at 05:10

## 2024-03-10 RX ADMIN — RANOLAZINE 500 MG: 500 TABLET, EXTENDED RELEASE ORAL at 08:09

## 2024-03-10 RX ADMIN — MEROPENEM 1000 MG: 1 INJECTION, POWDER, FOR SOLUTION INTRAVENOUS at 22:03

## 2024-03-10 RX ADMIN — FUROSEMIDE 20 MG: 10 INJECTION, SOLUTION INTRAMUSCULAR; INTRAVENOUS at 15:54

## 2024-03-10 RX ADMIN — ROPINIROLE HYDROCHLORIDE 4 MG: 1 TABLET, FILM COATED ORAL at 20:58

## 2024-03-10 RX ADMIN — PRIMIDONE 50 MG: 50 TABLET ORAL at 20:57

## 2024-03-10 RX ADMIN — MEROPENEM 1000 MG: 1 INJECTION, POWDER, FOR SOLUTION INTRAVENOUS at 06:24

## 2024-03-10 RX ADMIN — VANCOMYCIN HYDROCHLORIDE 1000 MG: 1 INJECTION, POWDER, LYOPHILIZED, FOR SOLUTION INTRAVENOUS at 18:58

## 2024-03-10 ASSESSMENT — PAIN SCALES - GENERAL
PAINLEVEL_OUTOF10: 5
PAINLEVEL_OUTOF10: 4
PAINLEVEL_OUTOF10: 5
PAINLEVEL_OUTOF10: 3
PAINLEVEL_OUTOF10: 4
PAINLEVEL_OUTOF10: 5
PAINLEVEL_OUTOF10: 8
PAINLEVEL_OUTOF10: 3

## 2024-03-10 ASSESSMENT — PAIN DESCRIPTION - LOCATION
LOCATION: LEG;KNEE
LOCATION: LEG
LOCATION: LEG
LOCATION: KNEE;LEG

## 2024-03-10 ASSESSMENT — PAIN DESCRIPTION - ORIENTATION
ORIENTATION: LEFT

## 2024-03-10 ASSESSMENT — PAIN DESCRIPTION - DESCRIPTORS
DESCRIPTORS: DISCOMFORT
DESCRIPTORS: DISCOMFORT
DESCRIPTORS: ACHING;BURNING;CRAMPING;DISCOMFORT

## 2024-03-10 ASSESSMENT — PAIN - FUNCTIONAL ASSESSMENT
PAIN_FUNCTIONAL_ASSESSMENT: PREVENTS OR INTERFERES SOME ACTIVE ACTIVITIES AND ADLS
PAIN_FUNCTIONAL_ASSESSMENT: PREVENTS OR INTERFERES SOME ACTIVE ACTIVITIES AND ADLS

## 2024-03-10 NOTE — CARE COORDINATION
ONGOING DISCHARGE PLAN:     Patient is alert and oriented x4.     Spoke with patient regarding discharge plan and patient confirms that plan is still to go to ARU if appropriate, if not she would like to return home. She no longer wishes to go to a skilled nursing facility. [facility].     PM&R consulted, awaiting postop PT/OT evaluation.      POD #2 I&D left knee with wound VAC placement (prior arthroplasty in January).     ID on board-IV vanco, RASHAWN paris.     Will continue to follow for additional discharge needs.     If patient is discharged prior to next notation, then this note serves as note for discharge by case management.    Electronically signed by Nisreen Wells RN on 3/10/2024 at 3:58 PM

## 2024-03-10 NOTE — PROGRESS NOTES
Vancomycin Dosing by Pharmacy - Daily Note   Vancomycin Therapy Day:  5  Indication: SSTI.    Allergies:  Iodides, Iodinated contrast media, Povidone-iodine, Sulfa antibiotics, Betadine [povidone iodine], Cephalexin, Cephalexin, Cozaar [losartan], Cymbalta [duloxetine hcl], Demerol, Doxycycline, Duloxetine, Meperidine, Morphine, Penicillins, Zithromax [azithromycin], Ciprofloxacin, Clindamycin/lincomycin, Etodolac, Fesoterodine, Fluorescein, Iodine, Lisinopril, Penicillin g, Toviaz [fesoterodine fumarate er], Clonazepam, and Metformin and related   Actual Weight:    Wt Readings from Last 1 Encounters:   03/06/24 73 kg (161 lb)       Labs/Ancillary Data  Estimated Creatinine Clearance: 56 mL/min (based on SCr of 0.7 mg/dL).  Recent Labs     03/07/24  0645 03/08/24  0708 03/10/24  0535   CREATININE 0.7 0.6 0.7   BUN 14 15 22   WBC 19.8* 12.1* 9.2     Procalcitonin   Date Value Ref Range Status   03/06/2024 0.13 (H) <0.09 ng/mL Final     Comment:           Suspected Sepsis:  <0.50 ng/mL     Low likelihood of sepsis.  0.50-2.00 ng/mL     Increased likelihood of sepsis. Antibiotics encouraged.  >2.00 ng/mL     High risk of sepsis/shock. Antibiotics strongly encouraged.        Suspected Lower Resp Tract Infections:  <0.24 ng/mL     Low likelihood of bacterial infection.  >0.24 ng/mL     Increased likelihood of bacterial infection. Antibiotics encouraged.        With successful antibiotic therapy, PCT levels should decrease rapidly. (Half-life of 24 to   36 hours.)        Procalcitonin values from samples collected within the first 6 hours of systemic infection   may still be low. Retesting may be indicated.  Values from day 1 and day 4 can be entered into the Change in Procalcitonin Calculator   ([email or web address]-pct-[email or web address]) to determine the patient's Mortality Risk Prognosis        In healthy neonates, plasma Procalcitonin (PCT) concentrations increase gradually after   birth, reaching peak values at about 24 hours of  2009.      Thank you for the consult.  Pharmacy will continue to follow.  LILI Orourke  3/10/2024   6:44 AM

## 2024-03-10 NOTE — PLAN OF CARE
Problem: Discharge Planning  Goal: Discharge to home or other facility with appropriate resources  3/10/2024 1636 by Ana Maria Ballard, RN  Outcome: Progressing     Problem: Pain  Goal: Verbalizes/displays adequate comfort level or baseline comfort level  3/10/2024 1636 by Ana Maria Ballard, RN  Outcome: Progressing     Problem: Safety - Adult  Goal: Free from fall injury  3/10/2024 1636 by Ana Maria Ballard, RN  Outcome: Progressing     Problem: Skin/Tissue Integrity  Goal: Absence of new skin breakdown  Description: 1.  Monitor for areas of redness and/or skin breakdown  2.  Assess vascular access sites hourly  3.  Every 4-6 hours minimum:  Change oxygen saturation probe site  4.  Every 4-6 hours:  If on nasal continuous positive airway pressure, respiratory therapy assess nares and determine need for appliance change or resting period.  3/10/2024 1636 by Ana Maria Ballard, RN  Outcome: Progressing

## 2024-03-10 NOTE — PROGRESS NOTES
Pt expressed frustration that she's back in the hospital with an infection in her knee and will probably require more surgery. She does not want to go back to ECF; will go to ARU but otherwise intends to go home. Writer provided listening presence and pt welcomed prayer.    03/10/24 1213   Encounter Summary   Encounter Overview/Reason  Spiritual/Emotional Needs   Service Provided For: Patient   [facility]   Last Encounter  03/10/24   Complexity of Encounter Moderate   Spiritual/Emotional needs   Type Spiritual Support   Assessment/Intervention/Outcome   Assessment Anxious;Impaired resilience   Intervention Active listening;Discussed illness injury and it’s impact;Explored/Affirmed feelings, thoughts, concerns;Explored Coping Skills/Resources;Prayer (assurance of)/[place];Sustaining Presence/Ministry of presence   Outcome Comfort;Engaged in conversation;Expressed feelings, needs, and concerns;Expressed Gratitude;Receptive

## 2024-03-10 NOTE — PROGRESS NOTES
[facility] -   Infectious diseases evaluation  admission date 3/6/2024    reason for consultation:   Left leg cellulitis    Impression :   Current:  UTI  Left  knee postoperative wound infection /cellulitis  3/8    S/p I & D with application of Vancomycin powder and wound vac.  Sepsis  Chronic Winters catheter/changed on admission  Status post left total hip arthroplasty/displaced periprosthetic femur fracture status post left total knee arthroplasty revision 1/23/24  History of ITP  CVA  Diabetes mellitus  Hypertension  Hyperlipidemia  Multiple antibiotics allergy  History of multidrug-resistant organism   Cephalosporin, sulfa penicillin, Cipro, doxycycline and clindamycin allergy      HENCE:   Continue IV vancomycin  IV meropenem  Follow CBC and renal function  Follow blood and wound cultures.  Supportive care.  Discussed with patient    Infection Control Recommendations   [place] Precautions  Contact Isolation       Antimicrobial Stewardship Recommendations   Simplification of therapy  Targeted therapy      History of Present Illness:   Initial history:  Komal Crawford is a 83 y.o.-year-old female presented to hospital with left knee incision wound with swelling, warmth and pain associated with fever with a temperature max of 102.6.  Symptoms moderate to severe, no alleviating or aggravating factors.  She denied cough, denied nausea or vomiting, no diarrhea, no other complaints.  Initial WBC 15.1, lactic acid 2.6  Urinalysis cloudy, moderate leukocyte esterase, too numerous to count WBC  She also complaining of cloudy urine, Winters catheter was changed on admission  Left humerus CT 3/6/2024 showed  IMPRESSION:  1. Extensive subcutaneous edema throughout the left thigh and proximal aspect  of the left lower leg compatible with lymphedema or cellulitis.  No abscess  or soft tissue gas.  2. No acute osseous abnormality.    Interval changes  3/10/2024   Afebrile  VS  stable, on RA  Alert, oriented, sitting up in chair  No acute changes,  Eating/drinking, denies n/v/d   Denies lt knee pain unless trying lift knee pain  Wound vac with serosanguinous drainage.  Winters, urine clear jessica  BC remain negative to date.  3/8 Lt leg Cx - preliminary result - Staph aureus  3/7 Lt Knee cx - preliminary result - MRSA    Methicillin-Resistant Staphylococcus aureus (1)  Antibiotic Interpretation Status   penicillin Resistant Preliminary   clindamycin Resistant Preliminary   erythromycin Resistant Preliminary   gentamicin Sensitive Preliminary    Gentamicin is used only in combination with other active agents that test susceptible.    levofloxacin Resistant Preliminary   oxacillin Resistant Preliminary   tetracycline Sensitive Preliminary   trimethoprim-sulfamethoxazole Sensitive Preliminary   vancomycin Sensitive Preliminary       Patient Vitals for the past 8 hrs:   BP Temp Temp src Pulse Resp SpO2   03/10/24 0730 115/65 97.7 °F (36.5 °C) Oral 62 18 100 %   03/10/24 0540 -- -- -- -- 16 --   03/10/24 0510 -- -- -- -- 17 --   03/10/24 0412 (!) 118/59 98 °F (36.7 °C) Oral 62 16 100 %             I have personally reviewed the past medical history, past surgical history, medications, social history, and family history, and I haveupdated the database accordingly.      Allergies:   Iodides, Iodinated contrast media, Povidone-iodine, Sulfa antibiotics, Betadine [povidone iodine], Cephalexin, Cephalexin, Cozaar [losartan], Cymbalta [duloxetine hcl], Demerol, Doxycycline, Duloxetine, Meperidine, Morphine, Penicillins, Zithromax [azithromycin], Ciprofloxacin, Clindamycin/lincomycin, Etodolac, Fesoterodine, Fluorescein, Iodine, Lisinopril, Penicillin g, Toviaz [fesoterodine fumarate er], Clonazepam, and Metformin and related     Review of Systems:     Review of Systems   Musculoskeletal:  Positive for myalgias.        L knee pain post op.   All other systems reviewed and are negative.    As per history  incontinenc post surgical    Caffeine use     2 coffee/day, 1-2 tea per week    Cerebral artery occlusion with cerebral infarction (HCC)     Chronic back pain     Chronic ITP (idiopathic thrombocytopenia) (HCC)     CVA (cerebral infarction) 2008, 2010    balance issues, short term memory loss    Diabetic gastroparesis (HCC)     Diverticular disease 1986    GERD (gastroesophageal reflux disease)     Hiatal hernia     Hip fracture (HCC)     History of blood transfusion     History of decreased platelet count     Hyperlipemia     Hypertension     Internal hemorrhoid     Macular degeneration of left eye 1980's    S/P laser treatment    Nausea and vomiting     Neuropathy     Osteoarthritis     Ringing in ears     Self-catheterizes urinary bladder     6-7 times daily    TIA (transient ischemic attack) 2000    x1    Tubular adenoma     colon polyps    Type II or unspecified type diabetes mellitus without mention of complication, not stated as uncontrolled     Urinary incontinence     frequent UTI s/p infection, surgical dilatation lead to incontinence    Wears dentures     full on top, partial on bottom    Wears glasses        Past Surgical  History:     Past Surgical History:   Procedure Laterality Date    APPENDECTOMY  1962    BLADDER SURGERY      bladder stimulator    BLADDER SUSPENSION  2002    multiple    CARDIAC CATHETERIZATION  2008    no stents    CARDIOVASCULAR STRESS TEST  12/2014    CATARACT REMOVAL WITH IMPLANT Left 11/07/2017    [facility]    CATARACT REMOVAL WITH IMPLANT Right 11/28/2017    [facility]    COLON SURGERY      colostomy reversal    COLONOSCOPY  04/07/2016    tubular adenoma x3; internal hemorrhoids    COLONOSCOPY N/A 11/06/2019    COLONOSCOPY DIAGNOSTIC performed [facility]    COLONOSCOPY  11/06/2019    COLOSTOMY  1986    bowel obstruction/ rupture from diverticular disease, reversal 3 mos later    CORONARY ANGIOPLASTY WITH STENT PLACEMENT   secretions in proximal esophagus       Medications:      vancomycin  1,000 mg IntraVENous Q24H    vancomycin (VANCOCIN) intermittent dosing (placeholder)   Other RX Placeholder    atorvastatin  10 mg Oral Daily    clopidogrel  75 mg Oral Daily    pantoprazole  40 mg Oral QAM AC    insulin glargine  10 Units SubCUTAneous Nightly    primidone  50 mg Oral BID    ranolazine  500 mg Oral BID    rOPINIRole  4 mg Oral Nightly    sodium chloride flush  5-40 mL IntraVENous 2 times per day    enoxaparin  40 mg SubCUTAneous Daily    insulin lispro  0-8 Units SubCUTAneous TID WC    insulin lispro  0-4 Units SubCUTAneous Nightly    meropenem  1,000 mg IntraVENous Q8H       Social History:     Social History     Socioeconomic History    Marital status:      Spouse name: Don    Number of children: 0    Years of education: 12    Highest education level: Not on file   Occupational History    Occupation: retired   Tobacco Use    Smoking status: Former     Current packs/day: 0.00     Average packs/day: 0.5 packs/day for 30.0 years (15.0 ttl pk-yrs)     Types: Cigarettes     Start date: 1952     Quit date: 1982     Years since quittin.8    Smokeless tobacco: Former     Quit date: 1982   Vaping Use    Vaping Use: Never used   Substance and Sexual Activity    Alcohol use: No    Drug use: No    Sexual activity: Not Currently   Other Topics Concern    Not on file   Social History Narrative    Not on file     Social Determinants of Health     Financial Resource Strain: Low Risk  (2023)    Overall Financial Resource Strain (CARDIA)     Difficulty of Paying Living Expenses: Not hard at all   Food Insecurity: No Food Insecurity (3/9/2024)    Hunger Vital Sign     Worried About Running Out of Food in the Last Year: Never true     Ran Out of Food in the Last Year: Never true   Transportation Needs: No Transportation Needs (3/9/2024)    PRAPARE - Transportation     Lack of Transportation (Medical): No     Lack of  Transportation (Non-Medical): No   Physical Activity: Inactive (2023)    Exercise Vital Sign     Days of Exercise per Week: 0 days     Minutes of Exercise per Session: 0 min   Stress: Stress Concern Present (2023)    [place] [employer] - Occupational Stress Questionnaire     Feeling of Stress : To some extent   Social Connections: Moderately Integrated (2023)    Social Connection and Isolation Panel [NHANES]     Frequency of Communication with Friends and Family: Three times a week     Frequency of Social Gatherings with Friends and Family: Once a week     Attends Yazdanism Services: More than 4 times per year     Active Member of Clubs or Organizations: No     Attends Club or Organization Meetings: Never     Marital Status:    Intimate Partner Violence: Not on file   Housing Stability: Low Risk  (3/9/2024)    Housing Stability Vital Sign     Unable to Pay for Housing in the Last Year: No     Number of Places Lived in the Last Year: 1     Unstable Housing in the Last Year: No       Family History:     Family History   Problem Relation Age of Onset    Breast Cancer Mother     Cancer Mother         breast and uterine  41    Heart Disease Father     Heart Attack Father          32    Cancer Maternal Grandmother     Cancer Brother 52        bronchogenic adenocarcinoma cancer    Lung Cancer Brother     Cancer Sister         lung    Lung Cancer Sister          65    Breast Cancer Sister          57    Cancer Sister         breast      Medical Decision Making:   I have independently reviewed/ordered the following labs:    CBC with Differential:   Recent Labs     24  0708 03/10/24  0535   WBC 12.1* 9.2   HGB 9.2* 8.5*   HCT 28.1* 25.2*   * 119*   LYMPHOPCT 7* 13*   MONOPCT 6 8*       BMP:  Recent Labs     24  0708 03/10/24  0535   * 136   K 3.7 3.6*    103   CO2 22 23   BUN 15 22   CREATININE 0.6 0.7   MG 1.6  --

## 2024-03-10 NOTE — PROGRESS NOTES
[facility]   IN-PATIENT SERVICE   [facility]    Progress note            Date:   3/10/2024  Patient name:  Komal Crawford  Date of admission:  3/6/2024  3:17 PM  MRN:   [id number]  Account:  [id number]  YOB: 1940  PCP:    Sabiha Bedolla MD  Room:   [facility]2084[facility]  Code Status:    Full Code    Chief Complaint:     Chief Complaint   Patient presents with    Wound Infection     Left knee redness and warmth, total knee done in January at [place]       History Obtained From:     patient    History of Present Illness:     The patient is a 83 y.o.  Non- / non  female who presents with Wound Infection (Left knee redness and warmth, total knee done in January at [place])   and she is admitted to the hospital for the management of left lower extremity cellulitis.    83-year-old female with past medical history significant for diabetes mellitus type 2, TIA, GERD, Winters's esophagus, chronic ITP presents to the hospital with concerns for left lower extremity edema and swelling that started 1-1/2 weeks back.  The patient reports that she underwent left ISHAN 1/20/2024, left total knee arthroplasty in January.  She was doing well postoperatively however for the past 1-1/2 weeks she has noted an opening in the anterior aspect of the left knee, with serosanguineous discharge, no reported puslike drainage, reports worsening erythema and swelling for the past 1-1/2-week.      Hematuria  Has neurogenic bladder, has had a Winters since the past 3 months now.  Reports hematuria.  Winters exchanged in the ER.    Otherwise denies GI concerns, cardiac or respiratory concerns    3/7/2024  Reports pain and swelling has improved significantly, reports redness is improving as well.      Past Medical History:     Past Medical History:   Diagnosis Date    Age-related cognitive decline     Ankle pain     Left ankle fracture in ortho boat.    Anxiety     B12 deficiency     Winters esophagus      later    CORONARY ANGIOPLASTY WITH STENT PLACEMENT  08/04/2022    CYSTOSCOPY  09/08/2017    W/ 200IU Botox     FRACTURE SURGERY Right 2011    hip    FRACTURE SURGERY Left 2001    WRIST    FRACTURE SURGERY Left 2013    ankle    HERNIA REPAIR      HIP SURGERY Right 11/6/2023    HIP HARDWARE REMOVAL - WITH DEEP HARDWARE REMOVAL 7.3 NABILA SCREW   X3 performed by Jon Bonner MD at [facility] OR    HYSTERECTOMY (CERVIX STATUS UNKNOWN)  1969    JOINT REPLACEMENT Bilateral 2000    BILAT KNEES    KNEE SURGERY Left 3/8/2024    KNEE INCISION AND DRAINAGE WITH WOUND VAC APPLICATION LEFT performed by Damian Keyes MD at [facility] OR    LUMBAR FUSION  2017    L2/L3    TX XCAPSL CTRC RMVL INSJ IO LENS PROSTH W/O ECP Left 11/07/2017    EYE CATARACT EMULSIFICATION IOL IMPLANT performed by Missy Clements MD at [facility] OR    TX XCAPSL CTRC RMVL INSJ IO LENS PROSTH W/O ECP Right 11/28/2017    EYE CATARACT EMULSIFICATION IOL IMPLANT performed by Missy Clements MD at [facility] OR    REVISION COLOSTOMY  1986    REVISION TOTAL KNEE ARTHROPLASTY Right 10/27/2015    WITH POLY EXCHANGE BIOMET AND GPS APPLICATION    REVISION TOTAL KNEE ARTHROPLASTY Left 07/14/2020    KNEE TOTAL ARTHROPLASTY REVISION - POLY EXCHANGE performed by Damian Keyes MD at [facility] OR    REVISION TOTAL KNEE ARTHROPLASTY Left 1/23/2024    KNEE TOTAL ARTHROPLASTY REVISION WITH OSS performed by Damian Keyes MD at [facility] OR    SPINE SURGERY  2010    fusion, did not take    SPINE SURGERY  1990    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      TONSILLECTOMY  1978    TOTAL HIP ARTHROPLASTY Right 11/6/2023    HIP TOTAL ARTHROPLASTY performed by Jon Bonner MD at [facility] OR    TOTAL HIP ARTHROPLASTY Left 1/20/2024    HIP TOTAL ARTHROPLASTY performed by Jon Bonner MD at [facility] OR    UPPER GASTROINTESTINAL ENDOSCOPY      UPPER GASTROINTESTINAL ENDOSCOPY  05/05/2014    UPPER GASTROINTESTINAL ENDOSCOPY  04/07/2016    mild gastritis    UPPER  cystitis without hematuria [N30.00] 03/09/2024    Open wound of left knee [S81.002A] 03/09/2024    Allergy to multiple antibiotics [Z88.1] 03/09/2024    Infection of total knee replacement (HCC) [T84.59XA, Z96.659] 03/08/2024    Severe sepsis (HCC) [A41.9, R65.20] 03/07/2024    Cellulitis of left lower extremity [L03.116] 03/07/2024    Sepsis (HCC) [A41.9] 03/06/2024       Plan:     Patient status Admit as inpatient in the  Progressive Unit/Step down    Left lower extremity cellulitis, improving.  ID on board, orthopedics on board, currently on IV vancomycin and meropenem, plans for wound debridement as per orthopedics.  Ultrasound lower extremities pending  CAUTI--present on admission, Winters exchange 3/6/2024.  Urology consulted.  Renal ultrasound ordered, await read.  Will need appointment set up to follow-up with urology before she leaves the hospital  Sepsis, fever, tachycardia, leukocytosis secondary to above.  Now resolved  Neurogenic bladder, with a Winters  Diabetes mellitus type II, well-controlled.  Hemoglobin A1c 6.2.  Continue current regimen  Chronic normocytic normochromic anemia, stable  History of CVA  S/p total left hip, left distal femur, replacement in January.  Also had right knee replacement in November.    DVT prophylaxis, will hold off for now given the patient has hematuria and serosanguineous oozing from the opening in the left knee.  Consider resumption of the surgery      3/8  Patient seen and examined  Is been afebrile vital stable  Go on IV meropenem and vancomycin  Wound cultures pending  Urine culture growing Candida and Pseudomonas  ing for left knee I&D and possible wound VAC placement today  Thrombocytopenia platelet count dropped to 107, does have history of chronic ITP, will closely monitor  Doppler lower extremity negative for DVT  Renal ultrasound did not show any hydronephrosis  Patient requesting PMNR consult which has been placed, does not does not Want to go back to

## 2024-03-10 NOTE — PLAN OF CARE
Problem: Discharge Planning  Goal: Discharge to home or other facility with appropriate resources  3/10/2024 0319 by Herminia Lockett RN  Outcome: Progressing  Flowsheets (Taken 3/10/2024 0319)  Discharge to home or other facility with appropriate resources:   Identify discharge learning needs (meds, wound care, etc)   Arrange for needed discharge resources and transportation as appropriate   Arrange for interpreters to assist at discharge as needed     Problem: Pain  Goal: Verbalizes/displays adequate comfort level or baseline comfort level  3/10/2024 0319 by Herminia Lockett RN  Outcome: Progressing  Flowsheets (Taken 3/10/2024 0319)  Verbalizes/displays adequate comfort level or baseline comfort level:   Administer analgesics based on type and severity of pain and evaluate response   Implement non-pharmacological measures as appropriate and evaluate response  Note: Pt medicated with pain medication prn.  Assessed all pain characteristics including level, type, location, frequency, and onset.  Non-pharmacologic interventions offered to pt as well.  Pt states pain is tolerable at this time.        Problem: Safety - Adult  Goal: Free from fall injury  3/10/2024 0319 by Herminia Lockett RN  Outcome: Progressing  Note: Pt assessed as a fall risk this shift. Remains free from falls and accidental injury at this time. Fall precautions in place, including falling star sign and fall risk band on pt. Floor free from obstacles, and bed is locked and in lowest position. Adequate lighting provided.  Pt encouraged to call before getting OOB for any need.  Bed alarm activated. Will continue to monitor needs during hourly rounding, and reinforce education on use of call light.       Problem: Skin/Tissue Integrity  Goal: Absence of new skin breakdown  Description: 1.  Monitor for areas of redness and/or skin breakdown  2.  Assess vascular access sites hourly  3.  Every 4-6 hours minimum:  Change oxygen saturation  Speech Language Pathology   Clinical Swallow Evaluation     Patient Name: Blair Celeste  AGE:  67 y.o., SEX:  male  Medical Record #: [id number]  Date of Service: 10/5/2023      History of Present Illness  68 y/o M admitted 10/4 w/ SOB. Admitted to the ICU on BiPap. Chest CT w/ a large LLL infiltrate; incidental finding of pulmonary mass.    PMHx: ETOH, tobacco abuse, DM II, HTN, BMI 43, COPD, RAYMOND    General Information:  Vitals  O2 (LPM): 6  O2 Delivery Device: Nasal Cannula  Level of Consciousness: Alert     Orientation: Oriented x 4  Follows Directives: Yes    Prior Living Situation & Level of Function:  Prior Services: None  Housing / Facility: Mobile Home  Lives with - Patient's Self Care Capacity: Spouse     Communication: WNL  Swallowing: Reports dysphagia c/b intermittent (1-2x/week) coughing on liquids and solids , chronic of a few months onset     Oral Mechanism Evaluation:  Dentition: Natural dentition, Some missing dentition   Facial Symmetry: Equal  Facial Sensation: Equal     Labial Observations: WFL   Lingual Observations: Midline  Motor Speech: WNL       Laryngeal Function:  Secretion Management: Adequate  Voice Quality: WFL     Cough: Perceptually WNL     Subjective: Pt seen alert & grossly oriented saturating on 6L NC. He reported a hx of dysphagia (of a few months onset) c/b coughing on foods and liquids about 1-2x/week. Examples of foods that he coughed on frequently were pork rinds and pills. He also reported globus sensation during these instances, and localized it to the cervical area.      Assessment  Current Method of Nutrition: Oral diet (regular diet, thin/all liquids)  Positioning: Bed - Chair Position  Bolus Administration: Patient  O2 (LPM): 6 O2 Delivery Device: Nasal Cannula  Factor(s) Affecting Performance: None    Swallowing Trials:  Swallowing Trials  Ice: WFL  Thin Liquid (TN0): WFL  Easy to Chew (EC7): WFL  Regular (RG7): WFL    Comments: Pt was able to self-feed independently  probe site  4.  Every 4-6 hours:  If on nasal continuous positive airway pressure, respiratory therapy assess nares and determine need for appliance change or resting period.  3/10/2024 0319 by Herminia Lockett, RN  Outcome: Progressing  Note: Skin assessment complete. Waffle mattress in place. Coccyx reddened. Sensicare applied PRN. Turned and repositioned every two hours.  Area kept free from moisture. Proper nourishment and fluids encouraged, as appropriate. Will continue to monitor for additional needs and changes in skin breakdown.       Problem: ABCDS Injury Assessment  Goal: Absence of physical injury  3/9/2024 1600 by Ana Maria Ballard, RN  Outcome: Progressing      w/ appropriate rate & volume control. Oral stage was functional w/ no oral residuals. Pt w/ preference to chew on the right side d/t mildly reduced upper dentition on the left. Swallow initiation appeared timely and no upper airway wetness, throat clearing or coughing was noted. Pt's RR generally remained < 30 however there were instances (e.g., when talking while eating) when it sommer to 40-45. Pt was encouraged to take rest breaks to coordinate swallowing-breathing during those instances.     Clinical Impressions  Clinical signs of mild pharyngeal versus esophageal dysphagia, likely acute-on-chronic and multifactorial in the context of mildly impacted swallow-breath coordination & hx of COPD. Pt admitted w/ workup for pneumonia, queries for aspiration PNA. Instrumentation appears indicated w/ preference for MBS > FEES for esophageal screen. Pt appears at lower risk for aspiration - recommend he continue a regular diet w/ general swallow precautions in the interim.     Recommendations  Diet Consistency: Regular solids, Thin/all Liquids  Instrumentation: VFSS (MBSS)  Medication: As tolerated  Supervision: Independent  Positioning: Fully upright and midline during oral intake, Meals sitting upright in a chair, as tolerated  Risk Management : Small bites/sips, Slow rate of intake, Physical mobility, as tolerated  Oral Care: BID  Consult Referral(s): Gastroenterologist    SLP Treatment Plan  Treatment Plan: Dysphagia Treatment  SLP Frequency: 2x Per Week  Estimated Duration: Until Therapy Goals Met    Anticipated Discharge Needs  Discharge Recommendations: Anticipate that the patient will have no further speech therapy needs after discharge from the hospital (Further recommendations to follow diagnostic)        Patient / Family Goals  Patient / Family Goal #1: I want to find out what's going on  Short Term Goals  Short Term Goal # 1: Pt will participate in instrumentation to define swallow physioogy and determine ST  NIGEL Menchaca, SLP

## 2024-03-10 NOTE — PROGRESS NOTES
Physical Therapy  Facility/Department: [facility] PROGRESSIVE CARE  Physical Therapy Initial Assessment    Name: Komal Crawford  : 1940  MRN: [id number]  Date of Service: 3/10/2024    Discharge Recommendations:  Patient would benefit from continued therapy after discharge, Therapy recommended at discharge   PT Equipment Recommendations  Equipment Needed: No      Patient Diagnosis(es): The primary encounter diagnosis was Severe sepsis (HCC). Diagnoses of Acute cystitis without hematuria, Cellulitis of left lower extremity, Sepsis, due to unspecified organism, unspecified whether acute organ dysfunction present (HCC), and Open wound of left knee, initial encounter were also pertinent to this visit.  Past Medical History:  has a past medical history of Age-related cognitive decline, Ankle pain, Anxiety, B12 deficiency, Winters esophagus, 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HCC), Caffeine use, Cerebral artery occlusion with cerebral infarction (HCC), Chronic back pain, Chronic ITP (idiopathic thrombocytopenia) (HCC), CVA (cerebral infarction), Diabetic gastroparesis (HCC), Diverticular disease, GERD (gastroesophageal reflux disease), Hiatal hernia, Hip fracture (HCC), History of blood transfusion, History of decreased platelet count, Hyperlipemia, Hypertension, Internal hemorrhoid, Macular degeneration of left eye, Nausea and vomiting, Neuropathy, Osteoarthritis, Ringing in ears, Self-catheterizes urinary bladder, TIA (transient ischemic attack), Tubular adenoma, Type II or unspecified type diabetes mellitus without mention of complication, not stated as uncontrolled, Urinary incontinence, Wears dentures, and Wears glasses.  Past Surgical History:  has a past surgical history that includes bladder suspension (); Hysterectomy (); Tonsillectomy (); Appendectomy (); Spine surgery (); colostomy (); Revision Colostomy (); Spine surgery (); Upper gastrointestinal endoscopy; Bladder surgery; Upper  assistance  Transfer Assistance: Needs assistance  Active : No  Occupation: Retired  Type of Occupation:   Vision/Hearing  Vision  Vision: Impaired  Vision Exceptions: Wears glasses at all times  Hearing  Hearing: Within functional limits    Cognition   Orientation  Overall Orientation Status: Within Functional Limits  Cognition  Overall Cognitive Status: WFL     Objective   O2 Device: Nasal cannula (2L)         AROM RLE (degrees)  RLE AROM: WNL  PROM LLE (degrees)  LLE General PROM: knee 0-60 degrees  Strength RLE  Comment: grossly 4/5  Strength LLE  L Hip Flexion: 3-/5  L Knee Flexion: 3-/5  L Knee Extension: 2/5  L Ankle Dorsiflexion: 3/5           Bed mobility  Supine to Sit: Maximum assistance (head of bed elevated)  Sit to Supine:  (pt left up in chair)  Scooting: Moderate assistance (to EOB)  Transfers  Sit to Stand: Maximum Assistance  Stand to Sit: Maximum Assistance  Bed to Chair: Dependent/Total (1 assist with marisol sanchez)  Comment: pt stood  twice with RW at bedside  Ambulation  Comments: attempted side-steps toward left to transfer to chair but pt unable to step with R LE(could wiggle L LE slightly laterally)     Balance  Sitting - Static: Good  Sitting - Dynamic: Good  Standing - Static: Fair (CGA with RW)  Standing - Dynamic: Fair (CGA with RW)         AM-PAC - Mobility    AM-PAC Basic Mobility - Inpatient   How much help is needed turning from your back to your side while in a flat bed without using bedrails?: A Lot  How much help is needed moving from lying on your back to sitting on the side of a flat bed without using bedrails?: Total  How much help is needed moving to and from a bed to a chair?: Total  How much help is needed standing up from a chair using your arms?: A Lot  How much help is needed walking in hospital room?: Total  How much help is needed climbing 3-5 steps with a railing?: Total  AM-PAC Inpatient Mobility Raw Score : 8  AM-PAC Inpatient T-Scale Score :  28.52  Mobility Inpatient CMS 0-100% Score: 86.62  Mobility Inpatient CMS G-Code Modifier : CM         Goals  Short Term Goals  Time Frame for Short Term Goals: 2-3 days  Short Term Goal 1: bed mobility with modA of 1  Short Term Goal 2: Pivot transfer to chair with RW and modA of 1  Short Term Goal 3: pt able to stand from various surfaces with modA of 1           Therapy Time   Individual Concurrent Group Co-treatment   Time In 0846         Time Out 0940         Minutes 54         Timed Code Treatment Minutes: 40 Minutes       Cassie Amos, PT

## 2024-03-11 ENCOUNTER — APPOINTMENT (OUTPATIENT)
Dept: INTERVENTIONAL RADIOLOGY/VASCULAR | Age: 84
DRG: 981 | End: 2024-03-11
Payer: MEDICARE

## 2024-03-11 DIAGNOSIS — D69.6 THROMBOCYTOPENIA (HCC): Primary | ICD-10-CM

## 2024-03-11 LAB
BASOPHILS # BLD: 0.05 K/UL (ref 0–0.2)
BASOPHILS NFR BLD: 1 % (ref 0–2)
BUN SERPL-MCNC: 19 MG/DL (ref 8–23)
CREAT SERPL-MCNC: 0.6 MG/DL (ref 0.5–0.9)
DATE LAST DOSE: NORMAL
EOSINOPHIL # BLD: 0.27 K/UL (ref 0–0.4)
EOSINOPHILS RELATIVE PERCENT: 5 % (ref 0–4)
ERYTHROCYTE [DISTWIDTH] IN BLOOD BY AUTOMATED COUNT: 17.5 % (ref 11.5–14.9)
GFR SERPL CREATININE-BSD FRML MDRD: >60 ML/MIN/1.73M2
GLUCOSE BLD-MCNC: 118 MG/DL (ref 65–105)
GLUCOSE BLD-MCNC: 130 MG/DL (ref 65–105)
GLUCOSE BLD-MCNC: 215 MG/DL (ref 65–105)
GLUCOSE BLD-MCNC: 75 MG/DL (ref 65–105)
HCT VFR BLD AUTO: 26.6 % (ref 36–46)
HGB BLD-MCNC: 8.6 G/DL (ref 12–16)
LYMPHOCYTES NFR BLD: 1.19 K/UL (ref 1–4.8)
LYMPHOCYTES RELATIVE PERCENT: 22 % (ref 24–44)
MCH RBC QN AUTO: 29.4 PG (ref 26–34)
MCHC RBC AUTO-ENTMCNC: 32.2 G/DL (ref 31–37)
MCV RBC AUTO: 91.5 FL (ref 80–100)
MICROORGANISM SPEC CULT: NORMAL
MICROORGANISM SPEC CULT: NORMAL
MONOCYTES NFR BLD: 0.7 K/UL (ref 0.1–1.3)
MONOCYTES NFR BLD: 13 % (ref 1–7)
MORPHOLOGY: ABNORMAL
NEUTROPHILS NFR BLD: 59 % (ref 36–66)
NEUTS SEG NFR BLD: 3.19 K/UL (ref 1.3–9.1)
PLATELET # BLD AUTO: 138 K/UL (ref 150–450)
PMV BLD AUTO: 13.3 FL (ref 6–12)
RBC # BLD AUTO: 2.91 M/UL (ref 4–5.2)
SERVICE CMNT-IMP: NORMAL
SERVICE CMNT-IMP: NORMAL
SPECIMEN DESCRIPTION: NORMAL
SPECIMEN DESCRIPTION: NORMAL
TME LAST DOSE: 1858 H
VANCOMYCIN DOSE: 1000 MG
VANCOMYCIN SERPL-MCNC: 23.6 UG/ML
WBC OTHER # BLD: 5.4 K/UL (ref 3.5–11)

## 2024-03-11 PROCEDURE — 97530 THERAPEUTIC ACTIVITIES: CPT

## 2024-03-11 PROCEDURE — 6370000000 HC RX 637 (ALT 250 FOR IP): Performed by: ORTHOPAEDIC SURGERY

## 2024-03-11 PROCEDURE — 6360000002 HC RX W HCPCS: Performed by: INTERNAL MEDICINE

## 2024-03-11 PROCEDURE — 99233 SBSQ HOSP IP/OBS HIGH 50: CPT | Performed by: INTERNAL MEDICINE

## 2024-03-11 PROCEDURE — 36558 INSERT TUNNELED CV CATH: CPT

## 2024-03-11 PROCEDURE — 97166 OT EVAL MOD COMPLEX 45 MIN: CPT

## 2024-03-11 PROCEDURE — 2060000000 HC ICU INTERMEDIATE R&B

## 2024-03-11 PROCEDURE — 77001 FLUOROGUIDE FOR VEIN DEVICE: CPT

## 2024-03-11 PROCEDURE — 97606 NEG PRS WND THER DME>50 SQCM: CPT

## 2024-03-11 PROCEDURE — 84520 ASSAY OF UREA NITROGEN: CPT

## 2024-03-11 PROCEDURE — 85025 COMPLETE CBC W/AUTO DIFF WBC: CPT

## 2024-03-11 PROCEDURE — 2580000003 HC RX 258: Performed by: ORTHOPAEDIC SURGERY

## 2024-03-11 PROCEDURE — 36415 COLL VENOUS BLD VENIPUNCTURE: CPT

## 2024-03-11 PROCEDURE — 6370000000 HC RX 637 (ALT 250 FOR IP): Performed by: INTERNAL MEDICINE

## 2024-03-11 PROCEDURE — 6360000002 HC RX W HCPCS: Performed by: ORTHOPAEDIC SURGERY

## 2024-03-11 PROCEDURE — 02HV33Z INSERTION OF INFUSION DEVICE INTO SUPERIOR VENA CAVA, PERCUTANEOUS APPROACH: ICD-10-PCS | Performed by: ORTHOPAEDIC SURGERY

## 2024-03-11 PROCEDURE — 80202 ASSAY OF VANCOMYCIN: CPT

## 2024-03-11 PROCEDURE — 36573 INSJ PICC RS&I 5 YR+: CPT

## 2024-03-11 PROCEDURE — 82947 ASSAY GLUCOSE BLOOD QUANT: CPT

## 2024-03-11 PROCEDURE — 97110 THERAPEUTIC EXERCISES: CPT

## 2024-03-11 PROCEDURE — 2709999900 IR FLUORO GUIDED CVA DEVICE PLMT/REPLACE/REMOVAL

## 2024-03-11 PROCEDURE — 99024 POSTOP FOLLOW-UP VISIT: CPT | Performed by: ORTHOPAEDIC SURGERY

## 2024-03-11 PROCEDURE — 82565 ASSAY OF CREATININE: CPT

## 2024-03-11 RX ADMIN — FUROSEMIDE 20 MG: 10 INJECTION, SOLUTION INTRAMUSCULAR; INTRAVENOUS at 09:12

## 2024-03-11 RX ADMIN — RANOLAZINE 500 MG: 500 TABLET, EXTENDED RELEASE ORAL at 09:12

## 2024-03-11 RX ADMIN — ATORVASTATIN CALCIUM 10 MG: 10 TABLET, FILM COATED ORAL at 09:12

## 2024-03-11 RX ADMIN — MIDODRINE HYDROCHLORIDE 2.5 MG: 2.5 TABLET ORAL at 12:23

## 2024-03-11 RX ADMIN — ASPIRIN 81 MG: 81 TABLET, CHEWABLE ORAL at 09:12

## 2024-03-11 RX ADMIN — SODIUM CHLORIDE, PRESERVATIVE FREE 10 ML: 5 INJECTION INTRAVENOUS at 21:02

## 2024-03-11 RX ADMIN — SODIUM CHLORIDE, PRESERVATIVE FREE 10 ML: 5 INJECTION INTRAVENOUS at 09:18

## 2024-03-11 RX ADMIN — CLOPIDOGREL BISULFATE 75 MG: 75 TABLET ORAL at 09:12

## 2024-03-11 RX ADMIN — PRIMIDONE 50 MG: 50 TABLET ORAL at 09:12

## 2024-03-11 RX ADMIN — PRIMIDONE 50 MG: 50 TABLET ORAL at 21:01

## 2024-03-11 RX ADMIN — PANTOPRAZOLE SODIUM 40 MG: 40 TABLET, DELAYED RELEASE ORAL at 05:58

## 2024-03-11 RX ADMIN — RANOLAZINE 500 MG: 500 TABLET, EXTENDED RELEASE ORAL at 21:01

## 2024-03-11 RX ADMIN — MEROPENEM 1000 MG: 1 INJECTION, POWDER, FOR SOLUTION INTRAVENOUS at 06:09

## 2024-03-11 RX ADMIN — HYDROCODONE BITARTRATE AND ACETAMINOPHEN 1 TABLET: 7.5; 325 TABLET ORAL at 21:02

## 2024-03-11 RX ADMIN — ENOXAPARIN SODIUM 40 MG: 100 INJECTION SUBCUTANEOUS at 09:13

## 2024-03-11 RX ADMIN — INSULIN GLARGINE 10 UNITS: 100 INJECTION, SOLUTION SUBCUTANEOUS at 21:02

## 2024-03-11 RX ADMIN — HYDROCODONE BITARTRATE AND ACETAMINOPHEN 1 TABLET: 7.5; 325 TABLET ORAL at 12:23

## 2024-03-11 RX ADMIN — INSULIN LISPRO 2 UNITS: 100 INJECTION, SOLUTION INTRAVENOUS; SUBCUTANEOUS at 13:35

## 2024-03-11 RX ADMIN — ROPINIROLE HYDROCHLORIDE 4 MG: 1 TABLET, FILM COATED ORAL at 21:01

## 2024-03-11 RX ADMIN — VANCOMYCIN HYDROCHLORIDE 1000 MG: 1 INJECTION, POWDER, LYOPHILIZED, FOR SOLUTION INTRAVENOUS at 16:53

## 2024-03-11 ASSESSMENT — PAIN DESCRIPTION - LOCATION
LOCATION: KNEE
LOCATION: HIP;KNEE
LOCATION: HIP;KNEE
LOCATION: FOOT;KNEE

## 2024-03-11 ASSESSMENT — PAIN SCALES - GENERAL
PAINLEVEL_OUTOF10: 10
PAINLEVEL_OUTOF10: 4
PAINLEVEL_OUTOF10: 8
PAINLEVEL_OUTOF10: 4
PAINLEVEL_OUTOF10: 7
PAINLEVEL_OUTOF10: 3

## 2024-03-11 ASSESSMENT — PAIN DESCRIPTION - DESCRIPTORS
DESCRIPTORS: ACHING
DESCRIPTORS: ACHING;DISCOMFORT
DESCRIPTORS: STABBING

## 2024-03-11 ASSESSMENT — PAIN DESCRIPTION - PAIN TYPE
TYPE: SURGICAL PAIN
TYPE: SURGICAL PAIN

## 2024-03-11 ASSESSMENT — PAIN DESCRIPTION - FREQUENCY
FREQUENCY: CONTINUOUS
FREQUENCY: CONTINUOUS

## 2024-03-11 ASSESSMENT — PAIN DESCRIPTION - ORIENTATION
ORIENTATION: LEFT

## 2024-03-11 ASSESSMENT — PAIN DESCRIPTION - ONSET
ONSET: ON-GOING
ONSET: ON-GOING

## 2024-03-11 NOTE — CARE COORDINATION
ONGOING DISCHARGE PLAN:    Patient is alert and oriented x4.    Spoke with patient regarding discharge plan and patient confirms she prefers ARU, however is agreeable to [facility].    PM&R consult complete and Dr. Harrell feels pt will be better suited at SNF.  Called and spoke with Komal at [facility]; informed her that pt will need IV Vanco through 4/24 and wound vac. Komal states this is fine and pt can come whenever medically ready.    POD#3 I&D left knee with wound VAC placement (prior arthroplasty in January).     Pt to have PICC placed today.    Pt has follow up appt with Dr. Keyes 4/10 @ 1135a.    [facility] following for once pt has been discharged from [facility].    Will continue to follow for additional discharge needs.    If patient is discharged prior to next notation, then this note serves as note for discharge by case management.    Electronically signed by Belgica Rodney RN on 3/11/2024 at 12:05 PM    Discussed with patient and spouse that pt has not been accepted at [email or web address] and that she will go to [facility] upon discharge. They are agreeable.    Electronically signed by Belgica Rodney RN on 3/11/2024 at 2:44 PM

## 2024-03-11 NOTE — PROGRESS NOTES
Physical Therapy  [facility]    Date: 3/11/24  Patient Name: Komal Crawford       Room: 4/4-01  MRN: [id number]   Account: [id number]   : 1940  (83 y.o.) Gender: female     Referring Practitioner: Lyudmila Lozano MD  Diagnosis: Sepsis; s/p L knee I&D and wound vac placement on 3/8/2024  Past Medical History:  has a past medical history of Age-related cognitive decline, Ankle pain, Anxiety, B12 deficiency, Winters esophagus, 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[facility]), Caffeine use, Cerebral artery occlusion with cerebral infarction ([facility]), Chronic back pain, Chronic ITP (idiopathic thrombocytopenia) ([facility]), CVA (cerebral infarction), Diabetic gastroparesis (HCC), Diverticular disease, GERD (gastroesophageal reflux disease), Hiatal hernia, Hip fracture ([facility]), History of blood transfusion, History of decreased platelet count, Hyperlipemia, Hypertension, Internal hemorrhoid, Macular degeneration of left eye, Nausea and vomiting, Neuropathy, Osteoarthritis, Ringing in ears, Self-catheterizes urinary bladder, TIA (transient ischemic attack), Tubular adenoma, Type II or unspecified type diabetes mellitus without mention of complication, not stated as uncontrolled, Urinary incontinence, Wears dentures, and Wears glasses.   Past Surgical History:   has a past surgical history that includes bladder suspension (); Hysterectomy (); Tonsillectomy (); Appendectomy (); Spine surgery (); colostomy (); Revision Colostomy (); Spine surgery (); Upper gastrointestinal endoscopy; Bladder surgery; Upper gastrointestinal endoscopy (2014); cardiovascular stress test (2014); Colon surgery; Total abdominal hysterectomy w/ bilateral salpingoophorectomy; fracture surgery (Right, ); fracture surgery (Left, ); fracture surgery (Left, ); Cardiac catheterization (); Revision total knee arthroplasty (Right, 10/27/2015); Colonoscopy (2016); Upper gastrointestinal  Person Assistance;Moderate Assistance (Min A x2 with RW; Mod A x2 with SS)  Stand to Sit: Minimal Assistance;2 Person Assistance  Bed to Chair: Dependent/Total (SS)    EXERCISES    Other exercises?: Yes  Other exercises 1: bed mobility x1  Other exercises 2: seated EOB ~ 8 minutes SBA  Other exercises 3: seated ankle pumps x15 reps  Other exercises 4: STS x1 with RW and Min A x2; STS x1 with SS Mod A x2  Other exercises 5: standing tolerance with RW ~ 47 seconds           Activity Tolerance: Patient limited by pain  PT Equipment Recommendations  Equipment Needed: No    Current Treatment Recommendations: Strengthening, ROM, Functional mobility training, Balance training, Transfer training, Therapeutic activities, Endurance training    Conditions Requiring Skilled Therapeutic Intervention  History: L TKA with Revision and I & D  Discharge Recommendations: Patient would benefit from continued therapy after discharge;Therapy recommended at discharge    [facility] Basic Mobility - Inpatient   How much help is needed turning from your back to your side while in a flat bed without using bedrails?: Total  How much help is needed moving from lying on your back to sitting on the side of a flat bed without using bedrails?: Total  How much help is needed moving to and from a bed to a chair?: Total  How much help is needed standing up from a chair using your arms?: Total  How much help is needed walking in hospital room?: Total  How much help is needed climbing 3-5 steps with a railing?: Total  AM-PAC Inpatient Mobility Raw Score : 6  AM-PAC Inpatient T-Scale Score : 23.55  Mobility Inpatient CMS 0-100% Score: 100  Mobility Inpatient CMS G-Code Modifier : CN     Goals  Short Term Goals  Time Frame for Short Term Goals: 2-3 days  Short Term Goal 1: bed mobility with modA of 1  Short Term Goal 2: Pivot transfer to chair with RW and modA of 1  Short Term Goal 3: pt able to stand from various surfaces with modA of 1       03/11/24 4834    PT Individual Minutes   Time In 0824   Time Out 0858   Minutes 34         Electronically signed by Aster Quijano PTA on 3/11/24 at 3:04 PM EDT

## 2024-03-11 NOTE — PROGRESS NOTES
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    Patient's wound VAC is intact is covered with Ace bandage slight drainage still from the wound VAC.  No calf tenderness patient moving the knee well.    Patient's cultures positive for MRSA sensitive to Vanco.  Patient will likely be on IV vancomycin for at least 6 weeks and more likely not will require lifelong antibiotic suppression    Wound care/ostomy team has been consulted to perform sponge changes on routine basis.  Patient will require extended-care facility most likely upon discharge

## 2024-03-11 NOTE — PROGRESS NOTES
Physical Medicine & Rehabilitation  Progress Note    3/11/2024 9:22 AM     CC: Ambulatory and ADL dysfunction due to infected left knee status post I&D and wound VAC placement 3/8, history of o left total hip arthroplasty 1/20/2024 left distal femur fracture status post total knee arthroplasty revision    ID status post I&D and application of Vanco powder and wound VAC 3/8 chronic Winters, IV Vanco and IV meropenem    Internal medicine of oxygen started IV Lasix for possible pulmonary venous congestion platelet count 119 anemia hemoglobin 8.5    Ortho left knee periprosthetic joint infection-continue Vanco will need PICC line weightbearing as tolerated left lower extremity    Subjective:   No complaints.  However notes has minimal support at home has been 89 with decreased vision    ROS:  Denies fevers, chills, sweats.  No chest pain, palpitations, lightheadedness.  Denies coughing, wheezing or shortness of breath.  Denies abdominal pain, nausea, diarrhea or constipation.  No new areas of joint pain.  Denies new areas of numbness or weakness.  Denies new anxiety or depression issues.  No new skin problems.    Rehabilitation:   PT:  Restrictions/Precautions: Weight Bearing, Fall Risk, General Precautions  Hip Precautions: Posterior hip precautions (L Hip)  Other position/activity restrictions: L ISHAN on 1-20-24  Left Lower Extremity Weight Bearing: Weight Bearing As Tolerated   Transfers  Sit to Stand: Maximum Assistance  Stand to Sit: Maximum Assistance  Bed to Chair: Dependent/Total (1 assist with marisol sanchez)  Comment: pt stood  twice with RW at bedside  Ambulation  Comments: attempted side-steps toward left to transfer to chair but pt unable to step with R LE(could wiggle L LE slightly laterally)    ed mobility  Supine to Sit: Maximum assistance (head of bed elevated)  Sit to Supine:  (pt left up in chair)  Scooting: Moderate assistance (to EOB)  Transfers  Sit to Stand: Maximum Assistance  Stand to Sit: Maximum  Assistance  Bed to Chair: Dependent/Total (1 assist with marisol sanchez)  Comment: pt stood  twice with RW at bedside  Ambulation  Comments: attempted side-steps toward left to transfer to chair but pt unable to step with R LE(could wiggle L LE slightly laterally)  Balance  Sitting - Static: Good  Sitting - Dynamic: Good  Standing - Static: Fair (CGA with RW)    OT: Pending  ADL  Skin Care: Chlorhexidine solution, Soap and water         eeding: Setup  Grooming: Setup  UE Bathing: Stand by assistance  LE Bathing: Dependent/Total  UE Dressing: Stand by assistance  LE Dressing: Dependent/Total  Toileting: Dependent/Total  Toileting Skilled Clinical Factors: brief/reeves catheter  Additional Comments: ADL scores based on skilled observation and clinical reasoning, unless otherwise noted. Pt is limited due to significant post-op pain and impaired balance impacting her performance during self care tasks    ST:        Objective:  BP (!) 140/67   Pulse 88   Temp 98.2 °F (36.8 °C) (Oral)   Resp 16   Ht 1.473 m (4' 10\")   Wt 75.4 kg (166 lb 3.6 oz)   SpO2 97%   BMI 34.74 kg/m²  I Body mass index is 34.74 kg/m². I   Wt Readings from Last 1 Encounters:   24 75.4 kg (166 lb 3.6 oz)      Temp (24hrs), Av.9 °F (36.6 °C), Min:97.5 °F (36.4 °C), Max:98.2 °F (36.8 °C)         GEN: well developed, well nourished, no acute distress  HEENT: Normocephalic atraumatic, EOMI, mucous membranes pink and moist  CV: RRR, no murmurs, rubs or gallops  PULM: CTAB, no rales or rhonchi. Respirations WNL and unlabored  ABD: soft, NT, ND, +BS and equal  NEURO: A&O x3. Sensation intact to light touch.   MSK: 4/5 upper and distal right lower extremity with straight leg right lower extremity limited left lower extremity-Ace wrap, wound VAC  EXTREMITIES: No calf tenderness to palpation bilaterally. No edema BLEs  SKIN: warm dry and intact with good turgor  PSYCH: appropriately interactive. Affect WNL.  Good spirits        Medications    Ranexa  Chronic Winters/intermittent cath  Type 2 diabetes  GERD-Protonix  Winters's esophagus  Chronic ITP-platelet 107-119 decreased from 193, was in 200s in early February  Constipation  Anemia hemoglobin 9.2-8.5  Tremor/restless leg syndrome-Requip, primidone  Obesity  Diabetes-insulin     Recommendations:  1. Diagnosis: Infected left knee  2. Therapy: Has PT and OT need max assist transfers dependent/total bed to chair, dependent/total lower extremity  3. Medical  Necessity: As above  4. Support: Clarify  5. Rehab recommendation: Require significant assistance, continues with chronic Winters as well as wound VAC, continues IV antibiotics and has limited support- 89 and decreased vision, she notes he cannot provide much support, patient notes does not have any other support-will need extended therapies and care-recommend SNF     Questionable hemoglobin trending down, decreased platelet  Patient also notes pending further surgery     6. DVT proph: Lovenox     It was my pleasure to evaluate Komal Crawford today.  Please call with questions.         Michael Eisenberg MD       This note is created with the assistance of a speech recognition program.  While intending to generate a document that actually reflects the content of the visit, the document can still have some errors including those of syntax and sound a like substitutions which may escape proof reading.  In such instances, actual meaning can be extrapolated by contextual diversion

## 2024-03-11 NOTE — PROGRESS NOTES
Vancomycin Dosing by Pharmacy - Daily Note   Vancomycin Therapy Day:  6  Indication: SSTI    Allergies:  Iodides, Iodinated contrast media, Povidone-iodine, Sulfa antibiotics, Betadine [povidone iodine], Cephalexin, Cephalexin, Cozaar [losartan], Cymbalta [duloxetine hcl], Demerol, Doxycycline, Duloxetine, Meperidine, Morphine, Penicillins, Zithromax [azithromycin], Ciprofloxacin, Clindamycin/lincomycin, Etodolac, Fesoterodine, Fluorescein, Iodine, Lisinopril, Penicillin g, Toviaz [fesoterodine fumarate er], Clonazepam, and Metformin and related   Actual Weight:    Wt Readings from Last 1 Encounters:   03/11/24 75.4 kg (166 lb 3.6 oz)       Labs/Ancillary Data  Estimated Creatinine Clearance: 67 mL/min (based on SCr of 0.6 mg/dL).  Recent Labs     03/10/24  0535 03/11/24  0616   CREATININE 0.7 0.6   BUN 22 19   WBC 9.2  --      Procalcitonin   Date Value Ref Range Status   03/06/2024 0.13 (H) <0.09 ng/mL Final     Comment:           Suspected Sepsis:  <0.50 ng/mL     Low likelihood of sepsis.  0.50-2.00 ng/mL     Increased likelihood of sepsis. Antibiotics encouraged.  >2.00 ng/mL     High risk of sepsis/shock. Antibiotics strongly encouraged.        Suspected Lower Resp Tract Infections:  <0.24 ng/mL     Low likelihood of bacterial infection.  >0.24 ng/mL     Increased likelihood of bacterial infection. Antibiotics encouraged.        With successful antibiotic therapy, PCT levels should decrease rapidly. (Half-life of 24 to   36 hours.)        Procalcitonin values from samples collected within the first 6 hours of systemic infection   may still be low. Retesting may be indicated.  Values from day 1 and day 4 can be entered into the Change in Procalcitonin Calculator   ([email or web address]) to determine the patient's Mortality Risk Prognosis        In healthy neonates, plasma Procalcitonin (PCT) concentrations increase gradually after   birth, reaching peak values at about 24 hours of age then decrease to  03/11/2024  Exposure target: AUC24 (range)400-600 mg/L.hr   AUC24,ss: 446 mg/L.hr  Probability of AUC24 > 400: 81 %  Ctrough,ss: 11.8 mg/L  Probability of Ctrough,ss > 20: 0 %  Probability of nephrotoxicity (Lodise HUYEN 2009): 7 %      Thank you for the consult.  Pharmacy will continue to follow.     Reina GonsalesD, BCPS 3/11/2024 8:27 AM

## 2024-03-11 NOTE — PLAN OF CARE
Problem: Discharge Planning  Goal: Discharge to home or other facility with appropriate resources  3/11/2024 0344 by Herminia Lockett RN  Outcome: Progressing  Flowsheets (Taken 3/11/2024 0344)  Discharge to home or other facility with appropriate resources:   Identify discharge learning needs (meds, wound care, etc)   Arrange for needed discharge resources and transportation as appropriate   Arrange for interpreters to assist at discharge as needed     Problem: Pain  Goal: Verbalizes/displays adequate comfort level or baseline comfort level  3/11/2024 0344 by Herminia Lockett RN  Outcome: Progressing  Note: Pt medicated with pain medication prn.  Assessed all pain characteristics including level, type, location, frequency, and onset.  Non-pharmacologic interventions offered to pt as well.  Pt states pain is tolerable at this time.        Problem: Safety - Adult  Goal: Free from fall injury  3/11/2024 0344 by Herminia Lockett RN  Outcome: Progressing  Note: Skin assessment complete. Waffle mattress in place. Coccyx reddened. Sensicare applied PRN. Turned and repositioned every two hours.  Area kept free from moisture. Proper nourishment and fluids encouraged, as appropriate. Will continue to monitor for additional needs and changes in skin breakdown.       Problem: Skin/Tissue Integrity  Goal: Absence of new skin breakdown  Description: 1.  Monitor for areas of redness and/or skin breakdown  2.  Assess vascular access sites hourly  3.  Every 4-6 hours minimum:  Change oxygen saturation probe site  4.  Every 4-6 hours:  If on nasal continuous positive airway pressure, respiratory therapy assess nares and determine need for appliance change or resting period.  3/11/2024 0344 by Herminia Lockett RN  Outcome: Progressing  Note: Skin assessment complete. Coccyx reddened. Sensicare applied PRN. Turned and repositioned every two hours.  Area kept free from moisture. Proper nourishment and fluids  encouraged, as appropriate. Will continue to monitor for additional needs and changes in skin breakdown.

## 2024-03-11 NOTE — PROGRESS NOTES
[facility] -   Infectious diseases evaluation  admission date 3/6/2024    reason for consultation:   Left leg cellulitis    Impression :   Current:  UTI  Left  knee postoperative wound infection with infected prosthesis status post irrigation and debridement with application of vancomycin powder and wound VAC  Sepsis  Chronic Winters catheter/changed on admission  Status post left total hip arthroplasty/displaced periprosthetic femur fracture status post left total knee arthroplasty revision 1/23/24  History of ITP  CVA  Diabetes mellitus  Hypertension  Hyperlipidemia  Multiple antibiotics allergy  History of multidrug-resistant organism   Cephalosporin, sulfa penicillin, Cipro, doxycycline and clindamycin allergy      HENCE:   Continue IV vancomycin through 4/24/2024  PICC line  Discontinue IV meropenem  Discussed with orthopedic surgery, hardware was retained, no further additional surgical intervention current.  Follow cultures and adjust antibiotics as needed  Pansensitive Pseudomonas aeruginosa and Candida albicans growth on urine culture from 3/1/2024  Follow CBC and renal function    Infection Control Recommendations   [place] Precautions  Contact Isolation       Antimicrobial Stewardship Recommendations   Simplification of therapy  Targeted therapy      History of Present Illness:   Initial history:  Komal Crawford is a 83 y.o.-year-old female presented to hospital with left knee incision wound with swelling, warmth and pain associated with fever with a temperature max of 102.6.  Symptoms moderate to severe, no alleviating or aggravating factors.  She denied cough, denied nausea or vomiting, no diarrhea, no other complaints.  Initial WBC 15.1, lactic acid 2.6  Urinalysis cloudy, moderate leukocyte esterase, too numerous to count WBC  She also complaining of cloudy urine, Winters catheter was changed on admission  Left humerus CT 3/6/2024 showed  IMPRESSION:  1.  Cancer Mother         breast and uterine  41    Heart Disease Father     Heart Attack Father          32    Cancer Maternal Grandmother     Cancer Brother 52        bronchogenic adenocarcinoma cancer    Lung Cancer Brother     Cancer Sister         lung    Lung Cancer Sister          65    Breast Cancer Sister          57    Cancer Sister         breast      Medical Decision Making:   I have independently reviewed/ordered the following labs:    CBC with Differential:   Recent Labs     03/10/24  0535   WBC 9.2   HGB 8.5*   HCT 25.2*   *   LYMPHOPCT 13*   MONOPCT 8*       BMP:  Recent Labs     03/10/24  0535 24  0616     --    K 3.6*  --      --    CO2 23  --    BUN 22 19   CREATININE 0.7 0.6       Hepatic Function Panel:   Recent Labs     03/10/24  0535   PROT 5.0*   LABALBU 2.7*   BILITOT 0.3   ALKPHOS 59   ALT 15   AST 22       No results for input(s): \"RPR\" in the last 72 hours.  No results for input(s): \"HIV\" in the last 72 hours.  No results for input(s): \"BC\" in the last 72 hours.  Lab Results   Component Value Date/Time    CREATININE 0.6 2024 06:16 AM    GLUCOSE 151 03/10/2024 05:35 AM    GLUCOSE 168 2023 08:59 AM       Detailed results:        Thank you for allowing us to participate in the care of this patient.Please call with questions.    This note is created with the assistance of a speech recognition program.  While intending to generate adocument that actually reflects the content of the visit, the document can still have some errors including those of syntax and sound a like substitutions which may escape proof reading.  It such instances, actual meaningcan be extrapolated by contextual diversion.    David Arteaga MD  Office: [phone]  Perfect serve / office [phone]

## 2024-03-11 NOTE — PROGRESS NOTES
[facility]   IN-PATIENT SERVICE   [facility]    Progress note            Date:   3/11/2024  Patient name:  Komal Crawford  Date of admission:  3/6/2024  3:17 PM  MRN:   [id number]  Account:  [id number]  YOB: 1940  PCP:    Sabiha Bedolla MD  Room:   [facility]  Code Status:    Full Code    Chief Complaint:     Chief Complaint   Patient presents with    Wound Infection     Left knee redness and warmth, total knee done in January at [place]       History Obtained From:     patient    History of Present Illness:     The patient is a 83 y.o.  Non- / non  female who presents with Wound Infection (Left knee redness and warmth, total knee done in January at [place])   and she is admitted to the hospital for the management of left lower extremity cellulitis.    83-year-old female with past medical history significant for diabetes mellitus type 2, TIA, GERD, Winters's esophagus, chronic ITP presents to the hospital with concerns for left lower extremity edema and swelling that started 1-1/2 weeks back.  The patient reports that she underwent left ISHAN 1/20/2024, left total knee arthroplasty in January.  She was doing well postoperatively however for the past 1-1/2 weeks she has noted an opening in the anterior aspect of the left knee, with serosanguineous discharge, no reported puslike drainage, reports worsening erythema and swelling for the past 1-1/2-week.      Hematuria  Has neurogenic bladder, has had a Winters since the past 3 months now.  Reports hematuria.  Winters exchanged in the ER.    Otherwise denies GI concerns, cardiac or respiratory concerns    3/7/2024  Reports pain and swelling has improved significantly, reports redness is improving as well.      Past Medical History:     Past Medical History:   Diagnosis Date    Age-related cognitive decline     Ankle pain     Left ankle fracture in ortho boat.    Anxiety     B12 deficiency     Winters esophagus      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HCC) 1990    left with urinary incontinenc post surgical    Caffeine use     2 coffee/day, 1-2 tea per week    Cerebral artery occlusion with cerebral infarction (HCC)     Chronic back pain     Chronic ITP (idiopathic thrombocytopenia) (HCC)     CVA (cerebral infarction) 2008, 2010    balance issues, short term memory loss    Diabetic gastroparesis (HCC)     Diverticular disease 1986    GERD (gastroesophageal reflux disease)     Hiatal hernia     Hip fracture (HCC)     History of blood transfusion     History of decreased platelet count     Hyperlipemia     Hypertension     Internal hemorrhoid     Macular degeneration of left eye 1980's    S/P laser treatment    Nausea and vomiting     Neuropathy     Osteoarthritis     Ringing in ears     Self-catheterizes urinary bladder     6-7 times daily    TIA (transient ischemic attack) 2000    x1    Tubular adenoma     colon polyps    Type II or unspecified type diabetes mellitus without mention of complication, not stated as uncontrolled     Urinary incontinence     frequent UTI s/p infection, surgical dilatation lead to incontinence    Wears dentures     full on top, partial on bottom    Wears glasses         Past Surgical History:     Past Surgical History:   Procedure Laterality Date    APPENDECTOMY  1962    BLADDER SURGERY      bladder stimulator    BLADDER SUSPENSION  2002    multiple    CARDIAC CATHETERIZATION  2008    no stents    CARDIOVASCULAR STRESS TEST  12/2014    CATARACT REMOVAL WITH IMPLANT Left 11/07/2017    [facility]    CATARACT REMOVAL WITH IMPLANT Right 11/28/2017    [facility]    COLON SURGERY      colostomy reversal    COLONOSCOPY  04/07/2016    tubular adenoma x3; internal hemorrhoids    COLONOSCOPY N/A 11/06/2019    COLONOSCOPY DIAGNOSTIC performed by Eli Marinelli MD at [facility] ENDO    COLONOSCOPY  11/06/2019    COLOSTOMY  1986    bowel obstruction/ rupture from diverticular disease, reversal 3 mos  17 g packet Take 1 packet by mouth daily as needed for Constipation 1/9/24   Sakshi Johnson MD   insulin glargine (LANTUS SOLOSTAR) 100 UNIT/ML injection pen Inject 10 Units into the skin nightly 1/9/24   Sakshi Johnson MD   Insulin Pen Needle ([employer] PEN NEEDLES 29G) 29G X 12MM MISC 1 each by Does not apply route daily 1/9/24   Sakshi Johnson MD   atorvastatin (LIPITOR) 10 MG tablet Take 1 tablet by mouth 4/1/23   Ana Carrington MD   primidone (MYSOLINE) 50 MG tablet Take 1 tablet by mouth 2 times daily 10/26/23 7/22/24  Sabiha Bedolla MD   glipiZIDE (GLUCOTROL) 5 MG tablet Takes 1.5 tabs in AM and 0.5 tab in PM 8/9/23   Ana Carrington MD   esomeprazole (NEXIUM) 40 MG delayed release capsule Take 1 capsule by mouth every morning (before breakfast) 4/17/23   Sabiha Bedolla MD   rOPINIRole (REQUIP) 4 MG tablet Take 1 tablet by mouth nightly 1/23/23   Sabiha Bedolla MD   clopidogrel (PLAVIX) 75 MG tablet Take 1 tablet by mouth daily 8/5/22   Ana Carrington MD   acetaminophen (TYLENOL) 500 MG tablet Take 2 tablets by mouth every 6 hours as needed for Pain 3/20/22   Delta Teixeira MD   Continuous Blood Gluc Sensor (FREESTYLE DAVIS 14 DAY SENSOR) [facility]  2/17/20   Ana Carrington MD   Continuous Blood Gluc  (FREESTYLE DAVIS 14 DAY READER) [facility]  12/9/19   Ana Carrington MD   CRANBERRY PO Take by mouth 2 times daily    Ana Carrington MD        Allergies:     Iodides, Iodinated contrast media, Povidone-iodine, Sulfa antibiotics, Betadine [povidone iodine], Cephalexin, Cephalexin, Cozaar [losartan], Cymbalta [duloxetine hcl], Demerol, Doxycycline, Duloxetine, Meperidine, Morphine, Penicillins, Zithromax [azithromycin], Ciprofloxacin, Clindamycin/lincomycin, Etodolac, Fesoterodine, Fluorescein, Iodine, Lisinopril, Penicillin g, Toviaz [fesoterodine fumarate er], Clonazepam, and Metformin and related    Social History:     Tobacco:    reports that she quit  118 (H) 65 - 105 mg/dL   BUN & Creatinine    Collection Time: 03/11/24  6:16 AM   Result Value Ref Range    BUN 19 8 - 23 mg/dL    Creatinine 0.6 0.5 - 0.9 mg/dL    Est, Glom Filt Rate >60 >60 mL/min/1.73m2   Vancomycin Level, Random    Collection Time: 03/11/24  6:16 AM   Result Value Ref Range    Vancomycin Rm 23.6 ug/mL    Vancomycin Random Dose amount 1000     Vancomycin Random Date last dose [id number]     Vancomycin Random Time last dose 1858    POC Glucose Fingerstick    Collection Time: 03/11/24 10:53 AM   Result Value Ref Range    POC Glucose 215 (H) 65 - 105 mg/dL       Imaging/Diagnostics:    Reviewed    Assessment :      Primary Problem  Sepsis (HCC)    Active Hospital Problems    Diagnosis Date Noted    Acute cystitis without hematuria [N30.00] 03/09/2024    Open wound of left knee [S81.002A] 03/09/2024    Allergy to multiple antibiotics [Z88.1] 03/09/2024    Infection of total knee replacement (HCC) [T84.59XA, Z96.659] 03/08/2024    Severe sepsis (HCC) [A41.9, R65.20] 03/07/2024    Cellulitis of left lower extremity [L03.116] 03/07/2024    Sepsis (HCC) [A41.9] 03/06/2024       Plan:     Patient status Admit as inpatient in the  Progressive Unit/Step down    Left lower extremity cellulitis, improving.  ID on board, orthopedics on board, currently on IV vancomycin and meropenem, plans for wound debridement as per orthopedics.  Ultrasound lower extremities pending  CAUTI--present on admission, Winters exchange 3/6/2024.  Urology consulted.  Renal ultrasound ordered, await read.  Will need appointment set up to follow-up with urology before she leaves the hospital  Sepsis, fever, tachycardia, leukocytosis secondary to above.  Now resolved  Neurogenic bladder, with a Winters  Diabetes mellitus type II, well-controlled.  Hemoglobin A1c 6.2.  Continue current regimen  Chronic normocytic normochromic anemia, stable  History of CVA  S/p total left hip, left distal femur, replacement in January.  Also had right knee  secondary to ITP  Patient has been accepted at [facility], patient to get PICC line placed today  Patient does not want to get discharged today  [facility]  Will do the discharge and talk to  to transfer her tomorrow morning  On room air  Patient to get discharged on Vanco  Consultations:   PHARMACY TO DOSE VANCOMYCIN  PHARMACY TO DOSE VANCOMYCIN  IP CONSULT TO INFECTIOUS DISEASES  IP CONSULT TO ORTHOPEDIC SURGERY  IP CONSULT TO UROLOGY  IP CONSULT TO PHYSICAL MEDICINE REHAB    Patient is admitted as inpatient status because of co-morbidities listed above, severity of signs and symptoms as outlined, requirement for current medical therapies and most importantly because of direct risk to patient if care not provided in a hospital setting.    Mya Melendez MD  3/11/2024  1:05 PM    Copy sent to Sabiha Christina MD    Please note that this chart was generated using voice recognition Dragon dictation software.  Although every effort was made to ensure the accuracy of this automated transcription, some errors in transcription may have occurred.

## 2024-03-11 NOTE — PROGRESS NOTES
[facility]  Progress Note      NAME:  Komal Crawford  MEDICAL RECORD NUMBER:  [id number]  AGE: 83 y.o.   GENDER: female  : 1940  TODAY'S DATE:  3/11/2024    [facility] nurse follow-up visit for NPWT dressing change.  Patient had I&D of left knee on 3/8 with VAC placed in OR.  Old dressing removed and wound irrigated with saline.  Most of the wound is clean and red.  There is some slough present, mostly devitalized subcutaneous tissue along the edges of the wound.  Sutures present in the base of the wound.  There is some undermining on both the lateral and medial sides.  Medial side deeper, with significant space between the flap and base of the wound.  White foam placed in this area of undermining.  Lateral side not quite as deep and lies flat against the base of the wound.  Edges protected with hydrocolloid dressing, remainder of wound filled with black foam.  Covered with drape and applied suction at -150 mmHg continuous suction.  Patient was given pain medication prior to dressing change and tolerated well.  Secure message sent to Dr. Keyes and patient to follow-up in outpatient wound care center after discharge.  Plan is for patient to discharge to [facility].  She still needs PICC line at this time, so she will likely discharge tomorrow.  Prior to discharge, primary RN can remove wound VAC dressing and apply saline wet-to-dry dressing for transport..       Measurements:  Negative Pressure Wound Therapy Leg Left;Anterior (Active)   Wound Type Surgical 24 1619   Unit Type KCI VAC Ulta 24 1245   Dressing Type White Foam;Black Foam 24 1245   Number of pieces used 3 24 1245   Number of pieces removed 1 24 1245   Cycle Continuous 24 1619   Target Pressure (mmHg) 125 24 1619   Canister changed? No 24 1245   Dressing Status Clean, dry & intact 24 1619   Dressing Changed Changed/New 24 1245   Drainage Amount Small 24 1619

## 2024-03-11 NOTE — PROGRESS NOTES
[facility]   Occupational Therapy Evaluation  Date: 3/11/24  Patient Name: Komal Crawford       Room: -01  MRN: [id number]  Account: [id number]   : 1940  (83 y.o.) Gender: female     Discharge Recommendations:  Further Occupational Therapy is recommended upon facility discharge.    OT Equipment Recommendations  Other: TBD    Referring Practitioner: Lyudmila Lozano MD  Diagnosis: Sepsis; s/p L knee I&D and wound vac placement on 3/8/2024   Additional Pertinent Hx: Per H&P Note: 83-year-old female with past medical history significant for diabetes mellitus type 2, TIA, GERD, Winters's esophagus, chronic ITP presents to the hospital with concerns for left lower extremity edema and swelling that started 1-1/2 weeks back.  The patient reports that she underwent left ISHAN 2024, left total knee arthroplasty in January.  She was doing well postoperatively however for the past 1-1/2 weeks she has noted an opening in the anterior aspect of the left knee, with serosanguineous discharge, no reported puslike drainage, reports worsening erythema and swelling for the past 1-1/2-week.    Treatment Diagnosis: Impaired self-care status    Past Medical History:  has a past medical history of Age-related cognitive decline, Ankle pain, Anxiety, B12 deficiency, Winters esophagus, 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[facility]), Caffeine use, Cerebral artery occlusion with cerebral infarction ([facility]), Chronic back pain, Chronic ITP (idiopathic thrombocytopenia) ([facility]), CVA (cerebral infarction), Diabetic gastroparesis ([facility]), Diverticular disease, GERD (gastroesophageal reflux disease), Hiatal hernia, Hip fracture ([facility]), History of blood transfusion, History of decreased platelet count, Hyperlipemia, Hypertension, Internal hemorrhoid, Macular degeneration of left eye, Nausea and vomiting, Neuropathy, Osteoarthritis, Ringing in ears, Self-catheterizes urinary bladder, TIA (transient ischemic attack), Tubular adenoma,  needed  Short Term Goal 3: perform functional transfers/toilet transfers with modA x 2 using least restrictive device  Short Term Goal 4: tolerate standing for 2+ minutes during functional activity of choice  Short Term Goal 5: actively participate in 15+ minutes of therapeutic exercise/functional activity to promote safety and independence with self care and mobility  Short Term Goal 6: OT to further assess functional mobility as appropriate    Plan  Occupational Therapy Plan  Times Per Week: 5-7  Current Treatment Recommendations: Strengthening, Balance training, Functional mobility training, Endurance training, Safety education & training, Patient/Caregiver education & training, Equipment evaluation, education, & procurement, Pain management, Positioning, Self-Care / ADL      OT Individual Minutes  OT Individual Minutes  Time In: 0824  Time Out: 0851  Minutes: 27  Time Code Minutes   Timed Code Treatment Minutes: 15 Minutes        Electronically signed by ELDA Baptiste on 3/11/24 at 10:35 AM EDT

## 2024-03-11 NOTE — PLAN OF CARE
Problem: Discharge Planning  Goal: Discharge to home or other facility with appropriate resources  3/11/2024 1624 by Jacinda López RN  Outcome: Progressing     Problem: Pain  Goal: Verbalizes/displays adequate comfort level or baseline comfort level  3/11/2024 1624 by Jacinda López RN  Outcome: Progressing     Problem: Safety - Adult  Goal: Free from fall injury  3/11/2024 1624 by Jacinda López RN  Outcome: Progressing     Problem: Skin/Tissue Integrity  Goal: Absence of new skin breakdown  Description: 1.  Monitor for areas of redness and/or skin breakdown  2.  Assess vascular access sites hourly  3.  Every 4-6 hours minimum:  Change oxygen saturation probe site  4.  Every 4-6 hours:  If on nasal continuous positive airway pressure, respiratory therapy assess nares and determine need for appliance change or resting period.  3/11/2024 1624 by Jacinda López RN  Outcome: Progressing     Problem: Chronic Conditions and Co-morbidities  Goal: Patient's chronic conditions and co-morbidity symptoms are monitored and maintained or improved  3/11/2024 1624 by Jacinda López RN  Outcome: Progressing     Problem: ABCDS Injury Assessment  Goal: Absence of physical injury  3/11/2024 1624 by Jacinda López RN  Outcome: Progressing

## 2024-03-12 VITALS
HEIGHT: 58 IN | DIASTOLIC BLOOD PRESSURE: 83 MMHG | WEIGHT: 166.23 LBS | SYSTOLIC BLOOD PRESSURE: 124 MMHG | TEMPERATURE: 98 F | RESPIRATION RATE: 16 BRPM | BODY MASS INDEX: 34.89 KG/M2 | HEART RATE: 73 BPM | OXYGEN SATURATION: 94 %

## 2024-03-12 LAB
BUN SERPL-MCNC: 15 MG/DL (ref 8–23)
CREAT SERPL-MCNC: 0.5 MG/DL (ref 0.5–0.9)
ERYTHROCYTE [DISTWIDTH] IN BLOOD BY AUTOMATED COUNT: 17.3 % (ref 11.5–14.9)
GFR SERPL CREATININE-BSD FRML MDRD: >60 ML/MIN/1.73M2
GLUCOSE BLD-MCNC: 140 MG/DL (ref 65–105)
GLUCOSE BLD-MCNC: 198 MG/DL (ref 65–105)
GLUCOSE BLD-MCNC: 83 MG/DL (ref 65–105)
GLUCOSE BLD-MCNC: 84 MG/DL (ref 65–105)
HCT VFR BLD AUTO: 28.7 % (ref 36–46)
HGB BLD-MCNC: 9.2 G/DL (ref 12–16)
MCH RBC QN AUTO: 29 PG (ref 26–34)
MCHC RBC AUTO-ENTMCNC: 32.1 G/DL (ref 31–37)
MCV RBC AUTO: 90.3 FL (ref 80–100)
PLATELET # BLD AUTO: 134 K/UL (ref 150–450)
PMV BLD AUTO: 12.4 FL (ref 6–12)
RBC # BLD AUTO: 3.18 M/UL (ref 4–5.2)
WBC OTHER # BLD: 5.4 K/UL (ref 3.5–11)

## 2024-03-12 PROCEDURE — 85027 COMPLETE CBC AUTOMATED: CPT

## 2024-03-12 PROCEDURE — 99239 HOSP IP/OBS DSCHRG MGMT >30: CPT | Performed by: INTERNAL MEDICINE

## 2024-03-12 PROCEDURE — 6360000002 HC RX W HCPCS: Performed by: ORTHOPAEDIC SURGERY

## 2024-03-12 PROCEDURE — 82565 ASSAY OF CREATININE: CPT

## 2024-03-12 PROCEDURE — 6360000002 HC RX W HCPCS: Performed by: INTERNAL MEDICINE

## 2024-03-12 PROCEDURE — 6370000000 HC RX 637 (ALT 250 FOR IP): Performed by: ORTHOPAEDIC SURGERY

## 2024-03-12 PROCEDURE — 6370000000 HC RX 637 (ALT 250 FOR IP): Performed by: INTERNAL MEDICINE

## 2024-03-12 PROCEDURE — 97530 THERAPEUTIC ACTIVITIES: CPT

## 2024-03-12 PROCEDURE — 2580000003 HC RX 258: Performed by: INTERNAL MEDICINE

## 2024-03-12 PROCEDURE — 36415 COLL VENOUS BLD VENIPUNCTURE: CPT

## 2024-03-12 PROCEDURE — 84520 ASSAY OF UREA NITROGEN: CPT

## 2024-03-12 PROCEDURE — 2580000003 HC RX 258: Performed by: ORTHOPAEDIC SURGERY

## 2024-03-12 PROCEDURE — 99232 SBSQ HOSP IP/OBS MODERATE 35: CPT | Performed by: INTERNAL MEDICINE

## 2024-03-12 PROCEDURE — 82947 ASSAY GLUCOSE BLOOD QUANT: CPT

## 2024-03-12 PROCEDURE — 6370000000 HC RX 637 (ALT 250 FOR IP)

## 2024-03-12 RX ORDER — ASPIRIN 81 MG/1
81 TABLET, CHEWABLE ORAL DAILY
Qty: 30 TABLET | Refills: 0 | Status: SHIPPED | OUTPATIENT
Start: 2024-03-12

## 2024-03-12 RX ORDER — HYDROCODONE BITARTRATE AND ACETAMINOPHEN 5; 325 MG/1; MG/1
1 TABLET ORAL EVERY 6 HOURS PRN
Qty: 20 TABLET | Refills: 0 | Status: SHIPPED | OUTPATIENT
Start: 2024-03-12 | End: 2024-03-17

## 2024-03-12 RX ADMIN — SODIUM CHLORIDE, PRESERVATIVE FREE 10 ML: 5 INJECTION INTRAVENOUS at 11:17

## 2024-03-12 RX ADMIN — HYDROCODONE BITARTRATE AND ACETAMINOPHEN 1 TABLET: 7.5; 325 TABLET ORAL at 08:57

## 2024-03-12 RX ADMIN — FUROSEMIDE 20 MG: 10 INJECTION, SOLUTION INTRAMUSCULAR; INTRAVENOUS at 11:16

## 2024-03-12 RX ADMIN — DICLOFENAC SODIUM TOPICAL GEL, 1% 2 G: 10 GEL TOPICAL at 11:19

## 2024-03-12 RX ADMIN — SODIUM CHLORIDE, PRESERVATIVE FREE 10 ML: 5 INJECTION INTRAVENOUS at 11:23

## 2024-03-12 RX ADMIN — PRIMIDONE 50 MG: 50 TABLET ORAL at 11:18

## 2024-03-12 RX ADMIN — DICLOFENAC SODIUM TOPICAL GEL, 1% 2 G: 10 GEL TOPICAL at 00:01

## 2024-03-12 RX ADMIN — ENOXAPARIN SODIUM 40 MG: 100 INJECTION SUBCUTANEOUS at 11:15

## 2024-03-12 RX ADMIN — ATORVASTATIN CALCIUM 10 MG: 10 TABLET, FILM COATED ORAL at 11:16

## 2024-03-12 RX ADMIN — RANOLAZINE 500 MG: 500 TABLET, EXTENDED RELEASE ORAL at 11:15

## 2024-03-12 RX ADMIN — VANCOMYCIN HYDROCHLORIDE 1250 MG: 1.25 INJECTION, POWDER, LYOPHILIZED, FOR SOLUTION INTRAVENOUS at 15:57

## 2024-03-12 RX ADMIN — ASPIRIN 81 MG: 81 TABLET, CHEWABLE ORAL at 11:16

## 2024-03-12 RX ADMIN — PANTOPRAZOLE SODIUM 40 MG: 40 TABLET, DELAYED RELEASE ORAL at 06:27

## 2024-03-12 RX ADMIN — CLOPIDOGREL BISULFATE 75 MG: 75 TABLET ORAL at 11:16

## 2024-03-12 ASSESSMENT — PAIN DESCRIPTION - LOCATION
LOCATION: KNEE
LOCATION: ANKLE;KNEE
LOCATION: HIP;KNEE

## 2024-03-12 ASSESSMENT — PAIN DESCRIPTION - ONSET: ONSET: ON-GOING

## 2024-03-12 ASSESSMENT — PAIN DESCRIPTION - ORIENTATION
ORIENTATION: LEFT

## 2024-03-12 ASSESSMENT — PAIN SCALES - GENERAL
PAINLEVEL_OUTOF10: 4
PAINLEVEL_OUTOF10: 6

## 2024-03-12 ASSESSMENT — PAIN DESCRIPTION - FREQUENCY: FREQUENCY: CONTINUOUS

## 2024-03-12 ASSESSMENT — PAIN DESCRIPTION - DESCRIPTORS
DESCRIPTORS: ACHING;DISCOMFORT
DESCRIPTORS: ACHING

## 2024-03-12 ASSESSMENT — PAIN - FUNCTIONAL ASSESSMENT: PAIN_FUNCTIONAL_ASSESSMENT: PREVENTS OR INTERFERES SOME ACTIVE ACTIVITIES AND ADLS

## 2024-03-12 ASSESSMENT — PAIN DESCRIPTION - PAIN TYPE: TYPE: SURGICAL PAIN

## 2024-03-12 ASSESSMENT — PAIN SCALES - WONG BAKER
WONGBAKER_NUMERICALRESPONSE: HURTS WHOLE LOT
WONGBAKER_NUMERICALRESPONSE: 8

## 2024-03-12 NOTE — PLAN OF CARE
Problem: Pain  Goal: Verbalizes/displays adequate comfort level or baseline comfort level  3/12/2024 0316 by Edel Pérez RN  Outcome: Progressing  Note. Pt medicated with pain medication prn. Assessed all pain characteristics including level, type, location, frequency, and onset. Non-pharmacologic interventions offered to pt as well. Pt states pain is tolerable at this time.     Problem: Safety - Adult  Goal: Free from fall injury  3/12/2024 0316 by Edel Pérez RN  Outcome: Progressing   Pt assessed as fall risk this shift. Remains free from falls and accidental injury at this time. Fall precautions in place, including falling star and fall risk band on pt. Floor free from obstacles, and bed is locked and in lowest position. Adequate lighting provided. Pt encouraged to call before getting OOB for any need. Bed alarm activated. Will continue to monitor needs during hourly rounding, and reinforce education on use call light.     Problem: Skin/Tissue Integrity  Goal: Absence of new skin breakdown  3/12/2024 0316 by Edel Pérez RN  Outcome: Progressing   Skin assessment complete. Striker machine in place. Coccyx reddened. Sensicare applied PRN. Turned and repositioned every two hours. Area kept free from moisture. Proper nourishment and fluids encouraged, as appropriate. Will continue to monitor for additional needs and changes in skin breakdown      Problem: Infection - Adult  Goal: Absence of infection at discharge  Outcome: Progressing

## 2024-03-12 NOTE — PROGRESS NOTES
[facility]   INPATIENT OCCUPATIONAL THERAPY  PROGRESS NOTE  Date: 3/12/2024  Patient Name: Komal Crawford       Room: -  MRN: [id number]    : 1940  (83 y.o.)  Gender: female   Referring Practitioner: Lyudmila Lozano MD  Diagnosis: Sepsis; s/p L knee I&D and wound vac placement on 3/8/2024      Discharge Recommendations:  Further Occupational Therapy is recommended upon facility discharge.      OT Equipment Recommendations  Other: TBD    Restrictions/Precautions  Restrictions/Precautions  Restrictions/Precautions: Weight Bearing;Fall Risk;General Precautions  Required Braces or Orthoses?:  (PRAFO boot at night)  Implants present? : Metal implants (L ISHAN; L TKA (antibiotic spacer))  Lower Extremity Weight Bearing Restrictions  Left Lower Extremity Weight Bearing: Weight Bearing As Tolerated  Position Activity Restriction  Hip Precautions: Posterior hip precautions (L Hip)  Other position/activity restrictions: Recent L ISHAN (posterior approach) on 2024, and L knee arthroplasty revision with antibiotic spacer on 2024    O2 Device: None (Room air)    Subjective  Subjective  Subjective: \"Oh yeah, we can try\" Pt is pleasant and cooperative for therapy.  Subjective  Pain: Pt does not report. Grimacing noted with standing.  Comments: LETY wood's tx. Co-tx with AARON STEPHENSON    Objective  Cognition  Overall Cognitive Status: WFL    Activities of Daily Living  ADL  Feeding: Setup  Grooming: Setup  UE Bathing: Stand by assistance  LE Bathing: Dependent/Total  UE Dressing: Stand by assistance  LE Dressing: Dependent/Total  Toileting: Dependent/Total  Toileting Skilled Clinical Factors: brief/reeves catheter  Additional Comments: ADL scores based on skilled observation and clinical reasoning, unless otherwise noted. Pt is limited due to significant post-op pain and impaired balance impacting her performance during self care tasks  Skin Care: Soap and  water    Balance  Balance  Sitting Balance: Supervision  Standing Balance: Dependent/Total (X2 A)  Standing Balance  Time: ~25 seconds, 30 seconds  Activity: static standing/functional transfers  Comment: BUE supported wih use of RW/SS.    Transfers/Mobility  Transfers  Sit to stand: 2 Person assistance;Maximum assistance  Stand to sit: 2 Person assistance;Maximum assistance  Transfer Comments: VC for hand placements. Max X2. First stand at RW for ~25 seconds, unable to pivot to bed. Second stand using SS for transfer to EOB.    Functional Mobility  Functional Mobility Comments: Not attempt at this time due to pain    Patient Education  Patient Education  Education Given To: Patient  Education Provided: Plan of Care, Role of Therapy, Precautions, Transfer Training  Education Method: Verbal  Barriers to Learning: Other (Comment) (pain)  Education Outcome: Continued education needed, Verbalized understanding, Demonstrated understanding    Goals  Short Term Goals  Time Frame for Short Term Goals: By discharge, pt will  Short Term Goal 1: perform upper body dressing with supervision  Short Term Goal 2: perform lower body dressing with maxA, using adaptive techniques/equipment as needed  Short Term Goal 3: perform functional transfers/toilet transfers with modA x 2 using least restrictive device  Short Term Goal 4: tolerate standing for 2+ minutes during functional activity of choice  Short Term Goal 5: actively participate in 15+ minutes of therapeutic exercise/functional activity to promote safety and independence with self care and mobility  Short Term Goal 6: OT to further assess functional mobility as appropriate  Occupational Therapy Plan  Times Per Week: 5-7  Current Treatment Recommendations: Strengthening, Balance training, Functional mobility training, Endurance training, Safety education & training, Patient/Caregiver education & training, Equipment evaluation, education, & procurement, Pain management,

## 2024-03-12 NOTE — DISCHARGE SUMMARY
IN-PATIENT SERVICE   [facility]    Discharge Summary     Patient ID: Komal Crawford  :  1940   MRN: [id number]     ACCOUNT:  [id number]   Patient's PCP: Sabiha Bedolla MD  Admit Date: 3/6/2024   Discharge Date: 3/12/2024    Length of Stay: 6  Code Status:  Full Code  Admitting Physician: Bala Olivares MD  Discharge Physician: Mya Melendez MD     Active Discharge Diagnoses:     Primary Problem  Sepsis (HCC)      Hospital Problems  Active Hospital Problems    Diagnosis Date Noted    Acute cystitis without hematuria [N30.00] 2024    Open wound of left knee [S81.002A] 2024    Allergy to multiple antibiotics [Z88.1] 2024    Infection of total knee replacement (HCC) [T84.59XA, Z96.659] 2024    Severe sepsis (HCC) [A41.9, R65.20] 2024    Cellulitis of left lower extremity [L03.116] 2024    Sepsis (HCC) [A41.9] 2024       Admission Condition:  fair     Discharged Condition: fair    Hospital Stay:     Hospital Course:  Komal Crawford is a 83 y.o. female who was admitted for the management of Sepsis (HCC) , presented to ER with Wound Infection (Left knee redness and warmth, total knee done in January at [place])      83-year-old female with past medical history significant for diabetes mellitus type 2, TIA, GERD, Winters's esophagus, chronic ITP presents to the hospital with concerns for left lower extremity edema and swelling that started 1-1/2 weeks back.  The patient reports that she underwent left ISHAN 2024, left total knee arthroplasty in January.  She was doing well postoperatively however for the past 1-1/2 weeks she has noted an opening in the anterior aspect of the left knee, with serosanguineous discharge, no reported puslike drainage, reports worsening erythema and swelling for the past 1-1/2-week.       Hematuria  Has neurogenic bladder, has had a Winters since the past 3 months now.  Reports hematuria.  Winters exchanged in the  arthroplasty with a OSS revision hinged prosthesis with stemmed femoral tibial components.  Patient's patellar alignment appears to be good as well in the lateral view.  The stem but nearly approaches that of the total hip replacement on the left side.  No obvious periprosthetic complications noted cement is noted throughout the distal femur and proximal tibia.         Consultations:    Consults:     Final Specialist Recommendations/Findings:   PHARMACY TO DOSE VANCOMYCIN  PHARMACY TO DOSE VANCOMYCIN  IP CONSULT TO INFECTIOUS DISEASES  IP CONSULT TO ORTHOPEDIC SURGERY  IP CONSULT TO UROLOGY  IP CONSULT TO PHYSICAL MEDICINE REHAB      The patient was seen and examined on day of discharge and this discharge summary is in conjunction with any daily progress note from day of discharge.    Discharge plan:     Disposition: Home    Physician Follow Up:   [facility]  [address]  [place] [id number]  [phone]        Damian Keyes MD  [address]  [place] [id number]  [phone]    Go on 4/10/2024  Post Hospital Follow Up @ 1135am       Requiring Further Evaluation/Follow Up POST HOSPITALIZATION/Incidental Findings:    Diet: cardiac diet    Activity: As tolerated    Instructions to Patient:     Discharge Medications:      Medication List        START taking these medications      aspirin 81 MG chewable tablet  Take 1 tablet by mouth daily     HYDROcodone-acetaminophen 5-325 MG per tablet  Commonly known as: Norco  Take 1 tablet by mouth every 6 hours as needed for Pain for up to 5 days. Intended supply: 30 days Max Daily Amount: 4 tablets            CONTINUE taking these medications      acetaminophen 500 MG tablet  Commonly known as: TYLENOL  Take 2 tablets by mouth every 6 hours as needed for Pain     atorvastatin 10 MG tablet  Commonly known as: LIPITOR     bisacodyl 5 MG EC tablet  Commonly known as: DULCOLAX  Take 1 tablet by mouth 2 times daily as needed for Constipation     clopidogrel 75 MG  nightly     senna 8.6 MG tablet  Commonly known as: SENOKOT  Take 2 tablets by mouth nightly as needed (constipation)     tiZANidine 2 MG tablet  Commonly known as: ZANAFLEX  Take 1 tablet by mouth nightly as needed (for pain)           * This list has 2 medication(s) that are the same as other medications prescribed for you. Read the directions carefully, and ask your doctor or other care provider to review them with you.                STOP taking these medications      rivaroxaban 10 MG Tabs tablet  Commonly known as: XARELTO               Where to Get Your Medications        You can get these medications from any pharmacy    Bring a paper prescription for each of these medications  aspirin 81 MG chewable tablet  HYDROcodone-acetaminophen 5-325 MG per tablet         Time Spent on discharge is  35 mins in patient examination, evaluation, counseling as well as medication reconciliation, prescriptions for required medications, discharge plan and follow up.    Electronically signed by   Mya Melendez MD  3/12/2024  8:55 AM      Thank you Sabiha Christina MD for the opportunity to be involved in this patient's care.

## 2024-03-12 NOTE — CARE COORDINATION
DISCHARGE PLANNING NOTE:    Spoke with Noor and Will in pharmacy.  Recommend IV Vanco 1250mg daily through 4/24/24.  Added to d/c med rec.  Spoke with Komal from [facility] who states pt will need to receive 4pm dose prior to discharge.    Transportation arranged with [facility] w/c for 7pm.    Notified Komal from [facility] and primary care RN, Katharine. Also notified patient who will notify her , Ritchie.    Phone number for report: [phone].    Electronically signed by Belgica Rodney RN on 3/12/2024 at 11:03 AM    AVS printed. Komal from [facility] can pull NATALIA and d/c med list from [employer].    Electronically signed by Belgica Rodney RN on 3/12/2024 at 11:11 AM

## 2024-03-12 NOTE — PROGRESS NOTES
[facility]  Progress Note      NAME:  Komal Crawford  MEDICAL RECORD NUMBER:  [id number]  AGE: 83 y.o.   GENDER: female  : 1940  TODAY'S DATE:  3/12/2024    Pt has follow up appointment with Dr Arteaga at [facility] on 3/22 at 10:00am.

## 2024-03-12 NOTE — PROGRESS NOTES
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[facility]), Caffeine use, Cerebral artery occlusion with cerebral infarction ([facility]), Chronic back pain, Chronic ITP (idiopathic thrombocytopenia) ([facility]), CVA (cerebral infarction), Diabetic gastroparesis (HCC), Diverticular disease, GERD (gastroesophageal reflux disease), Hiatal hernia, Hip fracture ([facility]), History of blood transfusion, History of decreased platelet count, Hyperlipemia, Hypertension, Internal hemorrhoid, Macular degeneration of left eye, Nausea and vomiting, Neuropathy, Osteoarthritis, Ringing in ears, Self-catheterizes urinary bladder, TIA (transient ischemic attack), Tubular adenoma, Type II or unspecified type diabetes mellitus without mention of complication, not stated as uncontrolled, Urinary incontinence, Wears dentures, and Wears glasses.   Past Surgical History:   has a past surgical history that includes bladder suspension (); Hysterectomy (); Tonsillectomy (); Appendectomy (); Spine surgery (); colostomy (); Revision Colostomy (); Spine surgery (); Upper gastrointestinal endoscopy; Bladder surgery; Upper gastrointestinal endoscopy (2014); cardiovascular stress test (2014); Colon surgery; Total abdominal hysterectomy w/ bilateral salpingoophorectomy; fracture surgery (Right, ); fracture surgery (Left, ); fracture surgery (Left, ); Cardiac catheterization (); Revision total knee arthroplasty (Right, 10/27/2015); Colonoscopy (2016); Upper gastrointestinal  Assistance  Bed to Chair: Dependent/Total (SS)    EXERCISES    Other exercises?: Yes  Other exercises 2: seated EOB ~ 8 minutes supervision  Other exercises 4: STS x1 with RW and STS x1 with SS Max A x2; increased time d/t pain  Other exercises 5: standing tolerance with RW ~ 25seconds x2           Activity Tolerance: Patient limited by pain  PT Equipment Recommendations  Equipment Needed: No    Current Treatment Recommendations: Strengthening, ROM, Functional mobility training, Balance training, Transfer training, Therapeutic activities, Endurance training    Conditions Requiring Skilled Therapeutic Intervention  History: L TKA with Revision and I & D  Discharge Recommendations: Patient would benefit from continued therapy after discharge;Therapy recommended at discharge    [facility] Basic Mobility - Inpatient   How much help is needed turning from your back to your side while in a flat bed without using bedrails?: Total  How much help is needed moving from lying on your back to sitting on the side of a flat bed without using bedrails?: Total  How much help is needed moving to and from a bed to a chair?: Total  How much help is needed standing up from a chair using your arms?: Total  How much help is needed walking in hospital room?: Total  How much help is needed climbing 3-5 steps with a railing?: Total  AM-PAC Inpatient Mobility Raw Score : 6  AM-PAC Inpatient T-Scale Score : 23.55  Mobility Inpatient CMS 0-100% Score: 100  Mobility Inpatient CMS G-Code Modifier : CN     Goals  Short Term Goals  Time Frame for Short Term Goals: 2-3 days  Short Term Goal 1: bed mobility with modA of 1  Short Term Goal 2: Pivot transfer to chair with RW and modA of 1  Short Term Goal 3: pt able to stand from various surfaces with modA of 1       03/12/24 1532   PT Individual Minutes   Time In 1138   Time Out 1203   Minutes 25         Electronically signed by Aster Quijano PTA on 3/12/24 at 3:32 PM EDT

## 2024-03-12 NOTE — PROGRESS NOTES
Pt requesting Voltaren gel for her right heel. Pt reports using the gel at home. Pam Sanabria NP notified. See Orders.

## 2024-03-12 NOTE — PROGRESS NOTES
Vancomycin Dosing by Pharmacy - Daily Note   Vancomycin Therapy Day:  7  Indication: ssti    Allergies:  Iodides, Iodinated contrast media, Povidone-iodine, Sulfa antibiotics, Betadine [povidone iodine], Cephalexin, Cephalexin, Cozaar [losartan], Cymbalta [duloxetine hcl], Demerol, Doxycycline, Duloxetine, Meperidine, Morphine, Penicillins, Zithromax [azithromycin], Ciprofloxacin, Clindamycin/lincomycin, Etodolac, Fesoterodine, Fluorescein, Iodine, Lisinopril, Penicillin g, Toviaz [fesoterodine fumarate er], Clonazepam, and Metformin and related   Actual Weight:    Wt Readings from Last 1 Encounters:   03/11/24 75.4 kg (166 lb 3.6 oz)       Labs/Ancillary Data  Estimated Creatinine Clearance: 80 mL/min (based on SCr of 0.5 mg/dL).  Recent Labs     03/10/24  0535 03/11/24  0616 03/12/24  0627   CREATININE 0.7 0.6 0.5   BUN 22 19 15   WBC 9.2 5.4 5.4     Procalcitonin   Date Value Ref Range Status   03/06/2024 0.13 (H) <0.09 ng/mL Final     Comment:           Suspected Sepsis:  <0.50 ng/mL     Low likelihood of sepsis.  0.50-2.00 ng/mL     Increased likelihood of sepsis. Antibiotics encouraged.  >2.00 ng/mL     High risk of sepsis/shock. Antibiotics strongly encouraged.        Suspected Lower Resp Tract Infections:  <0.24 ng/mL     Low likelihood of bacterial infection.  >0.24 ng/mL     Increased likelihood of bacterial infection. Antibiotics encouraged.        With successful antibiotic therapy, PCT levels should decrease rapidly. (Half-life of 24 to   36 hours.)        Procalcitonin values from samples collected within the first 6 hours of systemic infection   may still be low. Retesting may be indicated.  Values from day 1 and day 4 can be entered into the Change in Procalcitonin Calculator   ([email or web address]) to determine the patient's Mortality Risk Prognosis        In healthy neonates, plasma Procalcitonin (PCT) concentrations increase gradually after   birth, reaching peak values at about 24 hours    AUC24,ss: 456 mg/L.hr  Probability of AUC24 > 400: 88 %  Ctrough,ss: 11 mg/L  Probability of Ctrough,ss > 20: 0 %  Probability of nephrotoxicity (Lodise HUYEN 2009): 7 %      Thank you for the consult.  Pharmacy will continue to follow.    Bernadette Monahan, PharmD, BCPS  3/12/2024 7:50 AM

## 2024-03-12 NOTE — PROGRESS NOTES
[facility] -   Infectious diseases evaluation  admission date 3/6/2024    reason for consultation:   Left leg cellulitis    Impression :   Current:  UTI  Left  knee postoperative wound infection with infected prosthesis status post irrigation and debridement with application of vancomycin powder and wound VAC  Sepsis  Chronic Winters catheter/changed on admission  Status post left total hip arthroplasty/displaced periprosthetic femur fracture status post left total knee arthroplasty revision 1/23/24  History of ITP  CVA  Diabetes mellitus  Hypertension  Hyperlipidemia  Multiple antibiotics allergy  History of multidrug-resistant organism   Cephalosporin, sulfa penicillin, Cipro, doxycycline and clindamycin allergy  Mild thrombocytopenia      HENCE:   Continue IV vancomycin through 4/24/2024  PICC line was replaced  She received a course of meropenem 3/6/2024 through 3/11/2024  Follow final cultures and adjust antibiotics as needed  Pansensitive Pseudomonas aeruginosa and Candida albicans growth on urine culture from 3/1/2024  Follow CBC and BUN/creatinine weekly while on IV antibiotics  Follow-up in 1 week at wound care clinic    Infection Control Recommendations   [place] Precautions  Contact Isolation       Antimicrobial Stewardship Recommendations   Simplification of therapy  Targeted therapy      History of Present Illness:   Initial history:  Komal Crawford is a 83 y.o.-year-old female presented to hospital with left knee incision wound with swelling, warmth and pain associated with fever with a temperature max of 102.6.  Symptoms moderate to severe, no alleviating or aggravating factors.  She denied cough, denied nausea or vomiting, no diarrhea, no other complaints.  Initial WBC 15.1, lactic acid 2.6  Urinalysis cloudy, moderate leukocyte esterase, too numerous to count WBC  She also complaining of cloudy urine, Winters catheter was changed on admission  Left humerus  icterus.     Conjunctiva/sclera: Conjunctivae normal.   Cardiovascular:      Rate and Rhythm: Normal rate and regular rhythm.   Pulmonary:      Effort: Pulmonary effort is normal. No respiratory distress.   Abdominal:      General: There is no distension.      Palpations: Abdomen is soft.   Musculoskeletal:         General: Swelling and tenderness present.      Cervical back: Neck supple. No rigidity.      Left lower leg: Edema present.      Comments: Left knee dressing was not removed.   Skin:     Coloration: Skin is not jaundiced.      Findings: No bruising.   Neurological:      General: No focal deficit present.      Mental Status: She is alert and oriented to person, place, and time.         Past Medical History:     Past Medical History:   Diagnosis Date    Age-related cognitive decline     Ankle pain     Left ankle fracture in ortho boat.    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HCC) 1990    left with urinary incontinenc post surgical    Caffeine use     2 coffee/day, 1-2 tea per week    Cerebral artery occlusion with cerebral infarction (HCC)     Chronic back pain     Chronic ITP (idiopathic thrombocytopenia) (HCC)     CVA (cerebral infarction) 2008, 2010    balance issues, short term memory loss    Diabetic gastroparesis (HCC)     Diverticular disease 1986    GERD (gastroesophageal reflux disease)     Hiatal hernia     Hip fracture (HCC)     History of blood transfusion     History of decreased platelet count     Hyperlipemia     Hypertension     Internal hemorrhoid     Macular degeneration of left eye 1980's    S/P laser treatment    Nausea and vomiting     Neuropathy     Osteoarthritis     Ringing in ears     Self-catheterizes urinary bladder     6-7 times daily    TIA (transient ischemic attack) 2000    x1    Tubular adenoma     colon polyps    Type II or unspecified type diabetes mellitus without mention of complication, not stated as uncontrolled     Urinary  which may escape proof reading.  It such instances, actual meaningcan be extrapolated by contextual diversion.    David Arteaga MD  Office: [phone]  Perfect serve / office [phone]

## 2024-03-12 NOTE — PROGRESS NOTES
Pt discharged over to [facility]. Pt telemetry removed. Pt Picc line in place. Pt wet to dry dressing in place. Pt has all personal belongings. Pt picked up by transport in wheelchair, no signs of distress.

## 2024-03-12 NOTE — PLAN OF CARE
Problem: Discharge Planning  Goal: Discharge to home or other facility with appropriate resources  Outcome: Progressing     Problem: Pain  Goal: Verbalizes/displays adequate comfort level or baseline comfort level  3/12/2024 1524 by Katharine Corcoran RN  Outcome: Progressing     Problem: Safety - Adult  Goal: Free from fall injury  3/12/2024 1524 by Katharine Corcoran, RN  Outcome: Progressing     Problem: Skin/Tissue Integrity  Goal: Absence of new skin breakdown  Description: 1.  Monitor for areas of redness and/or skin breakdown  2.  Assess vascular access sites hourly  3.  Every 4-6 hours minimum:  Change oxygen saturation probe site  4.  Every 4-6 hours:  If on nasal continuous positive airway pressure, respiratory therapy assess nares and determine need for appliance change or resting period.  3/12/2024 1524 by Katharine Corcoran, RN  Outcome: Progressing

## 2024-03-13 ENCOUNTER — CARE COORDINATION (OUTPATIENT)
Dept: CARE COORDINATION | Age: 84
End: 2024-03-13

## 2024-03-13 LAB
MICROORGANISM SPEC CULT: ABNORMAL
MICROORGANISM SPEC CULT: ABNORMAL
MICROORGANISM/AGENT SPEC: ABNORMAL
MICROORGANISM/AGENT SPEC: ABNORMAL
SERVICE CMNT-IMP: ABNORMAL
SPECIMEN DESCRIPTION: ABNORMAL

## 2024-03-13 NOTE — CARE COORDINATION
Courtesy care management call.  She was at [facility] after admission after a fall, had total left hip and left total knee revision. Admitted again 3/6-3/12 for sepsis, acute cystitis. Had I+D of left knee open wound, has a wound vac and was discharged to [facility] for rehab and IV Ciarra, will follow with ID.    She is feeling pretty well, getting wound care at the facility.    Will follow up in a few weeks to check on discharge plans.    Future Appointments   Date Time Provider Department Center   3/18/2024  9:30 AM Nancy Bates MD [facility]   3/22/2024 10:00 AM David Arteaga MD [facility]   4/10/2024 11:35 AM Damian Keyes MD [facility]

## 2024-03-14 ENCOUNTER — OUTSIDE SERVICES (OUTPATIENT)
Dept: INFECTIOUS DISEASES | Age: 84
End: 2024-03-14
Payer: MEDICARE

## 2024-03-14 ENCOUNTER — HOSPITAL ENCOUNTER (OUTPATIENT)
Age: 84
Setting detail: SPECIMEN
Discharge: HOME | End: 2024-03-14

## 2024-03-14 VITALS
HEART RATE: 72 BPM | RESPIRATION RATE: 17 BRPM | TEMPERATURE: 97.9 F | OXYGEN SATURATION: 97 % | DIASTOLIC BLOOD PRESSURE: 69 MMHG | SYSTOLIC BLOOD PRESSURE: 154 MMHG

## 2024-03-14 DIAGNOSIS — T81.49XA METHICILLIN-RESISTANT STAPHYLOCOCCUS AUREUS INFECTION OF POSTOPERATIVE WOUND: Primary | ICD-10-CM

## 2024-03-14 DIAGNOSIS — D72.10 EOSINOPHILIA, UNSPECIFIED TYPE: ICD-10-CM

## 2024-03-14 DIAGNOSIS — Z88.1 ALLERGY TO MULTIPLE ANTIBIOTICS: ICD-10-CM

## 2024-03-14 DIAGNOSIS — B95.62 METHICILLIN-RESISTANT STAPHYLOCOCCUS AUREUS INFECTION OF POSTOPERATIVE WOUND: Primary | ICD-10-CM

## 2024-03-14 LAB
ALBUMIN SERPL-MCNC: 2.9 G/DL (ref 3.5–5.2)
ALBUMIN/GLOB SERPL: 1 {RATIO} (ref 1–2.5)
ALP SERPL-CCNC: 77 U/L (ref 35–104)
ALT SERPL-CCNC: 9 U/L (ref 10–35)
ANION GAP SERPL CALCULATED.3IONS-SCNC: 11 MMOL/L (ref 9–16)
AST SERPL-CCNC: 22 U/L (ref 10–35)
BASOPHILS # BLD: 0.08 K/UL (ref 0–0.2)
BASOPHILS NFR BLD: 1 % (ref 0–2)
BILIRUB SERPL-MCNC: 0.4 MG/DL (ref 0–1.2)
BUN SERPL-MCNC: 10 MG/DL (ref 8–23)
CALCIUM SERPL-MCNC: 8.3 MG/DL (ref 8.6–10.4)
CHLORIDE SERPL-SCNC: 101 MMOL/L (ref 98–107)
CO2 SERPL-SCNC: 26 MMOL/L (ref 20–31)
CREAT SERPL-MCNC: 0.5 MG/DL (ref 0.5–0.9)
EOSINOPHIL # BLD: 0.62 K/UL (ref 0–0.44)
EOSINOPHILS RELATIVE PERCENT: 9 % (ref 1–4)
ERYTHROCYTE [DISTWIDTH] IN BLOOD BY AUTOMATED COUNT: 16.6 % (ref 11.8–14.4)
GFR SERPL CREATININE-BSD FRML MDRD: >60 ML/MIN/1.73M2
GLUCOSE SERPL-MCNC: 119 MG/DL (ref 74–99)
HCT VFR BLD AUTO: 30.9 % (ref 36.3–47.1)
HGB BLD-MCNC: 9.5 G/DL (ref 11.9–15.1)
IMM GRANULOCYTES # BLD AUTO: 0.1 K/UL (ref 0–0.3)
IMM GRANULOCYTES NFR BLD: 2 %
LYMPHOCYTES NFR BLD: 1.4 K/UL (ref 1.1–3.7)
LYMPHOCYTES RELATIVE PERCENT: 21 % (ref 24–43)
MCH RBC QN AUTO: 28.7 PG (ref 25.2–33.5)
MCHC RBC AUTO-ENTMCNC: 30.7 G/DL (ref 28.4–34.8)
MCV RBC AUTO: 93.4 FL (ref 82.6–102.9)
MONOCYTES NFR BLD: 0.56 K/UL (ref 0.1–1.2)
MONOCYTES NFR BLD: 8 % (ref 3–12)
NEUTROPHILS NFR BLD: 59 % (ref 36–65)
NEUTS SEG NFR BLD: 3.92 K/UL (ref 1.5–8.1)
NRBC BLD-RTO: 0 PER 100 WBC
PLATELET # BLD AUTO: ABNORMAL K/UL (ref 138–453)
PLATELET, FLUORESCENCE: 183 K/UL (ref 138–453)
PLATELETS.RETICULATED NFR BLD AUTO: 17.7 % (ref 1.1–10.3)
POTASSIUM SERPL-SCNC: 2.8 MMOL/L (ref 3.7–5.3)
PROT SERPL-MCNC: 5.7 G/DL (ref 6.6–8.7)
RBC # BLD AUTO: 3.31 M/UL (ref 3.95–5.11)
RBC # BLD: ABNORMAL 10*6/UL
SODIUM SERPL-SCNC: 138 MMOL/L (ref 136–145)
WBC OTHER # BLD: 6.7 K/UL (ref 3.5–11.3)

## 2024-03-14 PROCEDURE — 99305 1ST NF CARE MODERATE MDM 35: CPT | Performed by: NURSE PRACTITIONER

## 2024-03-14 PROCEDURE — 85025 COMPLETE CBC W/AUTO DIFF WBC: CPT

## 2024-03-14 PROCEDURE — 80053 COMPREHEN METABOLIC PANEL: CPT

## 2024-03-14 PROCEDURE — P9603 ONE-WAY ALLOW PRORATED MILES: HCPCS

## 2024-03-14 PROCEDURE — 36415 COLL VENOUS BLD VENIPUNCTURE: CPT

## 2024-03-14 PROCEDURE — 85055 RETICULATED PLATELET ASSAY: CPT

## 2024-03-14 NOTE — PROGRESS NOTES
Mother     Cancer Mother         breast and uterine  41    Heart Disease Father     Heart Attack Father          32    Cancer Maternal Grandmother     Cancer Brother 52        bronchogenic adenocarcinoma cancer    Lung Cancer Brother     Cancer Sister         lung    Lung Cancer Sister          65    Breast Cancer Sister          57    Cancer Sister         breast      Medical Decision Making:   I have independently reviewed/ordered the following labs:    CBC with Differential:   Recent Labs     24   WBC 5.4 6.7   HGB 9.2* 9.5*   HCT 28.7* 30.9*   * See Reflexed IPF Result   LYMPHOPCT  --  21*   MONOPCT  --  8     BMP:  Recent Labs     24   NA  --  138   K  --  2.8*   CL  --  101   CO2  --  26   BUN 15 10   CREATININE 0.5 0.5     Hepatic Function Panel:   Recent Labs     24   PROT 5.7*   LABALBU 2.9*   BILITOT 0.4   ALKPHOS 77   ALT 9*   AST 22     No results for input(s): \"RPR\" in the last 72 hours.  No results for input(s): \"HIV\" in the last 72 hours.  No results for input(s): \"BC\" in the last 72 hours.  Lab Results   Component Value Date/Time    CREATININE 0.5 2024 06:12 AM    GLUCOSE 119 2024 06:12 AM    GLUCOSE 168 2023 08:59 AM       Detailed results:        Thank you for allowing us to participate in the care of this patient.Please call with questions.    This note is created with the assistance of a speech recognition program.  While intending to generate adocument that actually reflects the content of the visit, the document can still have some errors including those of syntax and sound a like substitutions which may escape proof reading.  It such instances, actual meaningcan be extrapolated by contextual diversion.    MU Seo - CNP  Office: [phone]  Cell: [phone]

## 2024-03-15 ENCOUNTER — HOSPITAL ENCOUNTER (OUTPATIENT)
Age: 84
Setting detail: SPECIMEN
Discharge: HOME | End: 2024-03-15

## 2024-03-15 LAB
POTASSIUM SERPL-SCNC: 3.3 MMOL/L (ref 3.7–5.3)
VANCOMYCIN TROUGH SERPL-MCNC: 14 UG/ML (ref 10–20)

## 2024-03-15 PROCEDURE — P9603 ONE-WAY ALLOW PRORATED MILES: HCPCS

## 2024-03-15 PROCEDURE — 36415 COLL VENOUS BLD VENIPUNCTURE: CPT

## 2024-03-15 PROCEDURE — 84132 ASSAY OF SERUM POTASSIUM: CPT

## 2024-03-15 PROCEDURE — 80202 ASSAY OF VANCOMYCIN: CPT

## 2024-03-18 ENCOUNTER — HOSPITAL ENCOUNTER (OUTPATIENT)
Age: 84
Setting detail: SPECIMEN
Discharge: HOME | End: 2024-03-18

## 2024-03-18 LAB
ANION GAP SERPL CALCULATED.3IONS-SCNC: 11 MMOL/L (ref 9–16)
BASOPHILS # BLD: 0.07 K/UL (ref 0–0.2)
BASOPHILS NFR BLD: 1 % (ref 0–2)
BUN SERPL-MCNC: 11 MG/DL (ref 8–23)
CALCIUM SERPL-MCNC: 8.5 MG/DL (ref 8.6–10.4)
CHLORIDE SERPL-SCNC: 105 MMOL/L (ref 98–107)
CO2 SERPL-SCNC: 22 MMOL/L (ref 20–31)
CREAT SERPL-MCNC: 0.6 MG/DL (ref 0.5–0.9)
EOSINOPHIL # BLD: 0.46 K/UL (ref 0–0.44)
EOSINOPHILS RELATIVE PERCENT: 7 % (ref 1–4)
ERYTHROCYTE [DISTWIDTH] IN BLOOD BY AUTOMATED COUNT: 16.5 % (ref 11.8–14.4)
GFR SERPL CREATININE-BSD FRML MDRD: >60 ML/MIN/1.73M2
GLUCOSE SERPL-MCNC: 119 MG/DL (ref 74–99)
HCT VFR BLD AUTO: 30.3 % (ref 36.3–47.1)
HGB BLD-MCNC: 9.2 G/DL (ref 11.9–15.1)
IMM GRANULOCYTES # BLD AUTO: 0.06 K/UL (ref 0–0.3)
IMM GRANULOCYTES NFR BLD: 1 %
LYMPHOCYTES NFR BLD: 1.24 K/UL (ref 1.1–3.7)
LYMPHOCYTES RELATIVE PERCENT: 19 % (ref 24–43)
MCH RBC QN AUTO: 28.7 PG (ref 25.2–33.5)
MCHC RBC AUTO-ENTMCNC: 30.4 G/DL (ref 28.4–34.8)
MCV RBC AUTO: 94.4 FL (ref 82.6–102.9)
MONOCYTES NFR BLD: 0.61 K/UL (ref 0.1–1.2)
MONOCYTES NFR BLD: 9 % (ref 3–12)
NEUTROPHILS NFR BLD: 63 % (ref 36–65)
NEUTS SEG NFR BLD: 4.1 K/UL (ref 1.5–8.1)
NRBC BLD-RTO: 0 PER 100 WBC
PLATELET # BLD AUTO: ABNORMAL K/UL (ref 138–453)
PLATELET, FLUORESCENCE: 191 K/UL (ref 138–453)
PLATELETS.RETICULATED NFR BLD AUTO: 16.3 % (ref 1.1–10.3)
POTASSIUM SERPL-SCNC: 3.4 MMOL/L (ref 3.7–5.3)
RBC # BLD AUTO: 3.21 M/UL (ref 3.95–5.11)
RBC # BLD: ABNORMAL 10*6/UL
SODIUM SERPL-SCNC: 138 MMOL/L (ref 136–145)
VANCOMYCIN TROUGH SERPL-MCNC: 15.4 UG/ML (ref 10–20)
WBC OTHER # BLD: 6.5 K/UL (ref 3.5–11.3)

## 2024-03-18 PROCEDURE — 85025 COMPLETE CBC W/AUTO DIFF WBC: CPT

## 2024-03-18 PROCEDURE — P9603 ONE-WAY ALLOW PRORATED MILES: HCPCS

## 2024-03-18 PROCEDURE — 80048 BASIC METABOLIC PNL TOTAL CA: CPT

## 2024-03-18 PROCEDURE — 80202 ASSAY OF VANCOMYCIN: CPT

## 2024-03-18 PROCEDURE — 85055 RETICULATED PLATELET ASSAY: CPT

## 2024-03-18 PROCEDURE — 36415 COLL VENOUS BLD VENIPUNCTURE: CPT

## 2024-03-19 ENCOUNTER — OUTSIDE SERVICES (OUTPATIENT)
Dept: INFECTIOUS DISEASES | Age: 84
End: 2024-03-19
Payer: MEDICARE

## 2024-03-19 VITALS
DIASTOLIC BLOOD PRESSURE: 64 MMHG | RESPIRATION RATE: 18 BRPM | OXYGEN SATURATION: 97 % | HEART RATE: 83 BPM | TEMPERATURE: 97.9 F | SYSTOLIC BLOOD PRESSURE: 128 MMHG

## 2024-03-19 DIAGNOSIS — T81.49XA METHICILLIN-RESISTANT STAPHYLOCOCCUS AUREUS INFECTION OF POSTOPERATIVE WOUND: Primary | ICD-10-CM

## 2024-03-19 DIAGNOSIS — Z88.1 ALLERGY TO MULTIPLE ANTIBIOTICS: ICD-10-CM

## 2024-03-19 DIAGNOSIS — B95.62 METHICILLIN-RESISTANT STAPHYLOCOCCUS AUREUS INFECTION OF POSTOPERATIVE WOUND: Primary | ICD-10-CM

## 2024-03-19 DIAGNOSIS — D72.10 EOSINOPHILIA, UNSPECIFIED TYPE: ICD-10-CM

## 2024-03-19 PROCEDURE — 99309 SBSQ NF CARE MODERATE MDM 30: CPT | Performed by: NURSE PRACTITIONER

## 2024-03-19 NOTE — PROGRESS NOTES
[facility] -   Infectious diseases evaluation  admission date 3/12/2024    reason for consultation:   Antibiotic Management    Impression :   Current:  Left  knee postoperative wound infection /cellulitis  3/8    S/p I & D with application of Vancomycin powder and wound vac.  Hardware was retained.  S/p left total knee arthroplasty revision 1/23/24  Eosinophilia  Allergies to multiple antibiotics.  Chronic reeves catheter/changed 3/6/24.    Other:    Discussion / summary of stay / plan of care/ Recommendations:     HENCE:   Vancomycin IV, pharmacy to dose x 6 weeks through 4/24/24.  Follow CBC and renal function closely.  Wound care.  Supportive care.  Discussed with patient.    Infection Control Recommendations   [place] Precautions  Contact Isolation     Antimicrobial Stewardship Recommendations   Simplification of therapy  Targeted therapy    History of Present Illness:   Initial history:  Komal Crawford is a 83 y.o.-year-old female s/p left total knee arthroplasty revision on 1/23/24 who presented to [place] on 3/6/24 with left knee incision wound with swelling, warmth and pain with associated fever of 102.6.  Patient underwent I & D of the left knee, application of Vancomycin powder and wound vac placement on 3/8/24.  Intra-operative wound culture grew MRSA.  Treated with Vancomycin IV while at [place]. Discharged to [facility] on 3/12/24 with plan for Vancomycin IV through 4/24/24.    Interval changes  3/19/2024   Resting in bed.  Feeling good.  Left knee with wound vac.  Minimal swelling.  RUE PICC site clean.  Area to RUE marked from elbow to forearm.  Denies any fever, chills, n/v/d.    Summary of relevant labs:  Labs:  WBC: 6.7-6.5  Eosinophils: 9-7  Cre: 0.5-0.6  Vanco: 15.4    Micro:  3/8 Intra-op Wound cx: MRSA  Susceptibility    Methicillin-Resistant Staphylococcus aureus (1)    Antibiotic Interpretation Microscan Method Status    penicillin

## 2024-03-20 ENCOUNTER — CARE COORDINATION (OUTPATIENT)
Dept: CARE COORDINATION | Age: 84
End: 2024-03-20

## 2024-03-20 NOTE — CARE COORDINATION
Independent   Functional Limitation in Range of Motion A 0. No impairment   Eating 06. Independent 06. Independent  Oral hygiene 05. Setup or clean-up assistance 06. Independent  Toileting hygiene 01. Dependent 04. Supervision or touching assistance  Shower/bathe self 02. Substantial/maximal assistance 03. Partial/moderate assistance  Upper body dressing 04. Supervision or touching assistance 06. Independent  Lower body dressing 02. Substantial/maximal assistance 03. Partial/moderate assistance  Putting on/taking off footwear 01. Dependent 03. Partial/moderate assistance  Personal hygiene 05. Setup or clean-up assistance 06. Independent    Section GG Mobility Score: 38  Patient: Komal Crawford  Date: 03/13/2024  Occasion: Admission       Question                            Score                                      Goal  Indoor Mobility (Ambulation) 3. Independent   Indicate the type of wheelchair or scooter used. 1. Manual   Wheel 50 feet with two turns 04. Supervision or touching assistance 06. Independent  Does the resident use a wheelchair and/or scooter? 1. Yes   Picking up object                01. Dependent               05. Setup or clean-up assistance  12 steps ^   4 steps ^   1 step (curb) 09. Not applicable   Walking 10 feet on uneven surfaces 09. Not applicable   Walk 150 feet 88. Not attempted due to medical condition or safety concerns.   Walk 50 feet with two turns 88. Not attempted due to medical condition or safety concerns.   Walk 10 feet 01. Dependent 04. Supervision or touching assistance  Car transfer 10. Not attempted due to environmental limitations (e.g., lack of equipment, weather constraints).   Tub/shower transfer 10. Not attempted due to environmental limitations (e.g., lack of equipment, weather constraints).   Toilet transfer 10. Not attempted due to environmental limitations (e.g., lack of equipment, weather constraints).   Chair/bed-to-chair transfer 02. Substantial/maximal assistance 05.

## 2024-03-21 ENCOUNTER — HOSPITAL ENCOUNTER (OUTPATIENT)
Age: 84
Setting detail: SPECIMEN
Discharge: HOME | End: 2024-03-21

## 2024-03-21 ENCOUNTER — OUTSIDE SERVICES (OUTPATIENT)
Dept: INFECTIOUS DISEASES | Age: 84
End: 2024-03-21

## 2024-03-21 VITALS
SYSTOLIC BLOOD PRESSURE: 170 MMHG | TEMPERATURE: 97.3 F | DIASTOLIC BLOOD PRESSURE: 68 MMHG | RESPIRATION RATE: 18 BRPM | HEART RATE: 70 BPM | OXYGEN SATURATION: 98 %

## 2024-03-21 DIAGNOSIS — B37.0 THRUSH OF MOUTH AND ESOPHAGUS (HCC): ICD-10-CM

## 2024-03-21 DIAGNOSIS — B37.81 THRUSH OF MOUTH AND ESOPHAGUS (HCC): ICD-10-CM

## 2024-03-21 DIAGNOSIS — T81.49XA METHICILLIN-RESISTANT STAPHYLOCOCCUS AUREUS INFECTION OF POSTOPERATIVE WOUND: Primary | ICD-10-CM

## 2024-03-21 DIAGNOSIS — Z88.1 ALLERGY TO MULTIPLE ANTIBIOTICS: ICD-10-CM

## 2024-03-21 DIAGNOSIS — B95.62 METHICILLIN-RESISTANT STAPHYLOCOCCUS AUREUS INFECTION OF POSTOPERATIVE WOUND: Primary | ICD-10-CM

## 2024-03-21 LAB
ANION GAP SERPL CALCULATED.3IONS-SCNC: 12 MMOL/L (ref 9–16)
BUN SERPL-MCNC: 10 MG/DL (ref 8–23)
CALCIUM SERPL-MCNC: 8.9 MG/DL (ref 8.6–10.4)
CHLORIDE SERPL-SCNC: 103 MMOL/L (ref 98–107)
CO2 SERPL-SCNC: 19 MMOL/L (ref 20–31)
CREAT SERPL-MCNC: 0.7 MG/DL (ref 0.5–0.9)
ERYTHROCYTE [DISTWIDTH] IN BLOOD BY AUTOMATED COUNT: 16.1 % (ref 11.8–14.4)
GFR SERPL CREATININE-BSD FRML MDRD: >60 ML/MIN/1.73M2
GLUCOSE SERPL-MCNC: 152 MG/DL (ref 74–99)
HCT VFR BLD AUTO: 31 % (ref 36.3–47.1)
HGB BLD-MCNC: 9.5 G/DL (ref 11.9–15.1)
MCH RBC QN AUTO: 29.1 PG (ref 25.2–33.5)
MCHC RBC AUTO-ENTMCNC: 30.6 G/DL (ref 28.4–34.8)
MCV RBC AUTO: 95.1 FL (ref 82.6–102.9)
NRBC BLD-RTO: 0 PER 100 WBC
PLATELET # BLD AUTO: ABNORMAL K/UL (ref 138–453)
PLATELET, FLUORESCENCE: 213 K/UL (ref 138–453)
PLATELETS.RETICULATED NFR BLD AUTO: 19.3 % (ref 1.1–10.3)
POTASSIUM SERPL-SCNC: 4 MMOL/L (ref 3.7–5.3)
RBC # BLD AUTO: 3.26 M/UL (ref 3.95–5.11)
SODIUM SERPL-SCNC: 134 MMOL/L (ref 136–145)
WBC OTHER # BLD: 10.3 K/UL (ref 3.5–11.3)

## 2024-03-21 PROCEDURE — 85055 RETICULATED PLATELET ASSAY: CPT

## 2024-03-21 PROCEDURE — 36415 COLL VENOUS BLD VENIPUNCTURE: CPT

## 2024-03-21 PROCEDURE — 80048 BASIC METABOLIC PNL TOTAL CA: CPT

## 2024-03-21 PROCEDURE — 85027 COMPLETE CBC AUTOMATED: CPT

## 2024-03-21 PROCEDURE — P9603 ONE-WAY ALLOW PRORATED MILES: HCPCS

## 2024-03-21 NOTE — PROGRESS NOTES
[facility] -   Infectious diseases evaluation  admission date 3/12/2024    reason for consultation:   Antibiotic Management    Impression :   Current:  Left  knee postoperative wound infection /cellulitis  3/8    S/p I & D with application of Vancomycin powder and wound vac.  Hardware was retained.  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  Allergies to multiple antibiotics.  Chronic reeves catheter/changed 3/6/24.    Other:    Discussion / summary of stay / plan of care/ Recommendations:     HENCE:   Vancomycin IV, pharmacy to dose x 6 weeks through 4/24/24.  Nystatin swish and swallow QID while on antibiotics.  Follow CBC and renal function closely.  Wound care.  Supportive care.  Discussed with patient.    Infection Control Recommendations   [place] Precautions  Contact Isolation     Antimicrobial Stewardship Recommendations   Simplification of therapy  Targeted therapy    History of Present Illness:   Initial history:  Komal Crawford is a 83 y.o.-year-old female s/p left total knee arthroplasty revision on 1/23/24 who presented to [place] on 3/6/24 with left knee incision wound with swelling, warmth and pain with associated fever of 102.6.  Patient underwent I & D of the left knee, application of Vancomycin powder and wound vac placement on 3/8/24.  Intra-operative wound culture grew MRSA.  Treated with Vancomycin IV while at [place]. Discharged to [facility] on 3/12/24 with plan for Vancomycin IV through 4/24/24.    Interval changes  3/21/2024   Sitting up in wc working with PT.  Feeling good today.  States she was not feeling well for a couple of days, was very tired.  Left knee with wound vac.  Minimal swelling.  RUE PICC site clean.  Denies any fever, chills, n/v/d.  Reports having thrush all the way down her esophagus making it hard to eat, swallow.  Improved since starting on Nystatin swish

## 2024-03-22 ENCOUNTER — HOSPITAL ENCOUNTER (OUTPATIENT)
Dept: WOUND CARE | Age: 84
Discharge: HOME | End: 2024-03-22
Payer: MEDICARE

## 2024-03-22 ENCOUNTER — HOSPITAL ENCOUNTER (OUTPATIENT)
Age: 84
Setting detail: SPECIMEN
Discharge: HOME | End: 2024-03-22

## 2024-03-22 VITALS
WEIGHT: 166 LBS | TEMPERATURE: 97 F | RESPIRATION RATE: 18 BRPM | BODY MASS INDEX: 34.85 KG/M2 | SYSTOLIC BLOOD PRESSURE: 115 MMHG | HEIGHT: 58 IN | DIASTOLIC BLOOD PRESSURE: 49 MMHG | HEART RATE: 76 BPM

## 2024-03-22 DIAGNOSIS — S81.002A OPEN WOUND OF LEFT KNEE, INITIAL ENCOUNTER: Primary | ICD-10-CM

## 2024-03-22 LAB — VANCOMYCIN TROUGH SERPL-MCNC: 13.9 UG/ML (ref 10–20)

## 2024-03-22 PROCEDURE — 11046 DBRDMT MUSC&/FSCA EA ADDL: CPT

## 2024-03-22 PROCEDURE — 11043 DBRDMT MUSC&/FSCA 1ST 20/<: CPT

## 2024-03-22 PROCEDURE — 99214 OFFICE O/P EST MOD 30 MIN: CPT

## 2024-03-22 PROCEDURE — 80202 ASSAY OF VANCOMYCIN: CPT

## 2024-03-22 PROCEDURE — 36415 COLL VENOUS BLD VENIPUNCTURE: CPT

## 2024-03-22 PROCEDURE — P9603 ONE-WAY ALLOW PRORATED MILES: HCPCS

## 2024-03-22 RX ORDER — GINSENG 100 MG
CAPSULE ORAL ONCE
OUTPATIENT
Start: 2024-03-22 | End: 2024-03-22

## 2024-03-22 RX ORDER — TRIAMCINOLONE ACETONIDE 1 MG/G
OINTMENT TOPICAL ONCE
OUTPATIENT
Start: 2024-03-22 | End: 2024-03-22

## 2024-03-22 RX ORDER — HYDRALAZINE HYDROCHLORIDE 25 MG/1
25 TABLET, FILM COATED ORAL DAILY
COMMUNITY

## 2024-03-22 RX ORDER — OMEPRAZOLE 20 MG/1
20 CAPSULE, DELAYED RELEASE ORAL DAILY
COMMUNITY

## 2024-03-22 RX ORDER — BETAMETHASONE DIPROPIONATE 0.5 MG/G
CREAM TOPICAL ONCE
OUTPATIENT
Start: 2024-03-22 | End: 2024-03-22

## 2024-03-22 RX ORDER — SODIUM CHLOR/HYPOCHLOROUS ACID 0.033 %
SOLUTION, IRRIGATION IRRIGATION ONCE
OUTPATIENT
Start: 2024-03-22 | End: 2024-03-22

## 2024-03-22 RX ORDER — LIDOCAINE 50 MG/G
OINTMENT TOPICAL ONCE
OUTPATIENT
Start: 2024-03-22 | End: 2024-03-22

## 2024-03-22 RX ORDER — CLOBETASOL PROPIONATE 0.5 MG/G
OINTMENT TOPICAL ONCE
OUTPATIENT
Start: 2024-03-22 | End: 2024-03-22

## 2024-03-22 RX ORDER — LIDOCAINE HYDROCHLORIDE 40 MG/ML
SOLUTION TOPICAL ONCE
Status: COMPLETED | OUTPATIENT
Start: 2024-03-22 | End: 2024-03-22

## 2024-03-22 RX ORDER — LIDOCAINE HYDROCHLORIDE 20 MG/ML
JELLY TOPICAL ONCE
OUTPATIENT
Start: 2024-03-22 | End: 2024-03-22

## 2024-03-22 RX ORDER — DIPHENHYDRAMINE HCL 25 MG
25 CAPSULE ORAL EVERY 6 HOURS PRN
COMMUNITY

## 2024-03-22 RX ORDER — GENTAMICIN SULFATE 1 MG/G
OINTMENT TOPICAL ONCE
OUTPATIENT
Start: 2024-03-22 | End: 2024-03-22

## 2024-03-22 RX ORDER — IBUPROFEN 200 MG
TABLET ORAL ONCE
OUTPATIENT
Start: 2024-03-22 | End: 2024-03-22

## 2024-03-22 RX ORDER — LIDOCAINE 40 MG/G
CREAM TOPICAL ONCE
OUTPATIENT
Start: 2024-03-22 | End: 2024-03-22

## 2024-03-22 RX ORDER — BACITRACIN ZINC AND POLYMYXIN B SULFATE 500; 1000 [USP'U]/G; [USP'U]/G
OINTMENT TOPICAL ONCE
OUTPATIENT
Start: 2024-03-22 | End: 2024-03-22

## 2024-03-22 RX ORDER — SENNOSIDES A AND B 8.6 MG/1
1 TABLET, FILM COATED ORAL 2 TIMES DAILY
COMMUNITY

## 2024-03-22 RX ORDER — LIDOCAINE HYDROCHLORIDE 40 MG/ML
SOLUTION TOPICAL ONCE
OUTPATIENT
Start: 2024-03-22 | End: 2024-03-22

## 2024-03-22 RX ORDER — M-VIT,TX,IRON,MINS/CALC/FOLIC 27MG-0.4MG
1 TABLET ORAL DAILY
COMMUNITY

## 2024-03-22 RX ORDER — TRAMADOL HYDROCHLORIDE 50 MG/1
50 TABLET ORAL EVERY 6 HOURS PRN
COMMUNITY

## 2024-03-22 RX ADMIN — LIDOCAINE HYDROCHLORIDE 15 ML: 40 SOLUTION TOPICAL at 10:30

## 2024-03-22 ASSESSMENT — PAIN SCALES - GENERAL: PAINLEVEL_OUTOF10: 0

## 2024-03-22 NOTE — PLAN OF CARE
Problem: Chronic Conditions and Co-morbidities  Goal: Patient's chronic conditions and co-morbidity symptoms are monitored and maintained or improved  Outcome: Progressing     Problem: ABCDS Injury Assessment  Goal: Absence of physical injury  Outcome: Progressing  Flowsheets (Taken 3/22/2024 0942 by Franci Bowling RN)  Absence of Physical Injury: Implement safety measures based on patient assessment     Problem: Cognitive:  Goal: Knowledge of wound care  Description: Knowledge of wound care  Outcome: Progressing  Goal: Understands risk factors for wounds  Description: Understands risk factors for wounds  Outcome: Progressing     Problem: Wound:  Goal: Will show signs of wound healing; wound closure and no evidence of infection  Description: Will show signs of wound healing; wound closure and no evidence of infection  Outcome: Progressing     Problem: Falls - Risk of:  Goal: Will remain free from falls  Description: Will remain free from falls  Outcome: Progressing

## 2024-03-22 NOTE — PROGRESS NOTES
(PLAVIX) 75 MG tablet Take 1 tablet by mouth daily      acetaminophen (TYLENOL) 500 MG tablet Take 2 tablets by mouth every 6 hours as needed for Pain (Patient not taking: Reported on 3/22/2024) 60 tablet 0    Continuous Blood Gluc Sensor (FREESTYLE DAVIS 14 DAY SENSOR) MISC       Continuous Blood Gluc  (FREESTYLE DAVIS 14 DAY READER) MATTHEW       CRANBERRY PO Take by mouth 2 times daily       Current Facility-Administered Medications on File Prior to Encounter   Medication Dose Route Frequency Provider Last Rate Last Admin    0.9 % sodium chloride infusion 250 mL  250 mL IntraVENous Once Treasure Wolff MD           REVIEW OF SYSTEMS  Review of Systems    A comprehensive review of systems was negative.    Objective:      BP (!) 115/49   Pulse 76   Temp 97 °F (36.1 °C) (Tympanic)   Resp 18   Ht 1.473 m (4' 10\")   Wt 75.3 kg (166 lb)   BMI 34.69 kg/m²     Wt Readings from Last 3 Encounters:   03/22/24 75.3 kg (166 lb)   03/11/24 75.4 kg (166 lb 3.6 oz)   02/27/24 73.5 kg (162 lb)       PHYSICAL EXAM  Physical Exam    General Appearance: alert and oriented to person, place and time, well developed and well- nourished, in no acute distress  Skin: warm and dry, no rash or erythema, right knee large open wound with exposed prosthesis, unstable patella, no necrotic tissue, no surrounding erythema  Head: normocephalic and atraumatic  Eyes: pupils equal, round,  conjunctivae normal  Neck: supple and non-tender   Pulmonary/Chest: normal air movement, no respiratory distress  Abdomen: soft, non-tender  Extremities: no cyanosis, clubbing, bilateral edema  Winters catheter in place with clear urine      Assessment:        Problem List Items Addressed This Visit       Open wound of left knee - Primary        Procedure Note  Indications:  Based on my examination of this patient's wound(s)/ulcer(s) today, debridement is required to promote healing and evaluate the wound base.    Performed by: David Arteaga MD    Consent

## 2024-03-22 NOTE — DISCHARGE INSTRUCTIONS
[facility]PERBAR[facility]MENT  CENTER      Visit  Discharge Instructions / Physician Orders  DATE: 3/22/24     Home Care: Patient is currently at [facility]     SUPPLIES ORDERED THRU:                     DATE LAST SUPPLIED     Wound Location:  Left Knee   TKA per Dr. Kailey Muller with: Liquid antibacterial soap and water, rinse well      Dressing Orders:  Primary dressing   VASHE MOIST TO MOIST IN WOUND CARE TODAY                                                          CONTINUE NEG PRESSURE SYSTEM AS ORDER BY ORTHO( DR. RAZA)  Frequency:  CHANGE MWF     Additional Orders: Increase protein to diet (meat, cheese, eggs, fish, peanut butter, nuts and beans)  Multivitamin daily  CALL DR. RAZA'S OFFICE TO GET APPOINTMENT MOVED UP TO NEXT WEEK-DR. SELLERS DISCUSSED THIS WITH DR. RAZA  OFFLOADING [] YES  TYPE:                  [x] NA    Weekly wound care visits until determined otherwise.    Antibiotic therapy-wound care related YES [x] NO [] NA[]    vancomycin (VANCOCIN) infusion [[id number]]    Order Details  Dose: 1,250 mg Route: IntraVENous Frequency: EVERY 24 HOURS   Dispense Quantity: 54 g Refills: 0          Sig: Infuse 1,250 mg intravenously every 24 hours Compound per protocol.         Start Date: 03/12/24 End Date: 04/24/24 after 43 doses     MY CHART []     Smart Device  []     HYPERBARIC TREATMENT-                TREATMENT #                          Your next appointment with the Wound Care Center is in 1 week WITH DR. SELLERS                                                                                                   (Please note your next appointment above and if you are unable to keep, kindly give a 24 hour notice. Thank you.)  If more than 15 min late we cannot guarantee you will be seen due to clinician schedule  Per Policy, Excessive cancellation will call for dismissal from program.  If you experience any of the following, please call the Wound Care Center during business hours:

## 2024-03-25 ENCOUNTER — HOSPITAL ENCOUNTER (OUTPATIENT)
Age: 84
Setting detail: SPECIMEN
Discharge: HOME | End: 2024-03-25

## 2024-03-25 LAB
ANION GAP SERPL CALCULATED.3IONS-SCNC: 9 MMOL/L (ref 9–16)
BASOPHILS # BLD: 0.1 K/UL (ref 0–0.2)
BASOPHILS NFR BLD: 1 % (ref 0–2)
BUN SERPL-MCNC: 12 MG/DL (ref 8–23)
CALCIUM SERPL-MCNC: 8.4 MG/DL (ref 8.6–10.4)
CHLORIDE SERPL-SCNC: 107 MMOL/L (ref 98–107)
CO2 SERPL-SCNC: 21 MMOL/L (ref 20–31)
CREAT SERPL-MCNC: 0.6 MG/DL (ref 0.5–0.9)
EOSINOPHIL # BLD: 0.5 K/UL (ref 0–0.44)
EOSINOPHILS RELATIVE PERCENT: 7 % (ref 1–4)
ERYTHROCYTE [DISTWIDTH] IN BLOOD BY AUTOMATED COUNT: 15.9 % (ref 11.8–14.4)
GFR SERPL CREATININE-BSD FRML MDRD: 90 ML/MIN/1.73M2
GLUCOSE SERPL-MCNC: 99 MG/DL (ref 74–99)
HCT VFR BLD AUTO: 26 % (ref 36.3–47.1)
HGB BLD-MCNC: 8 G/DL (ref 11.9–15.1)
IMM GRANULOCYTES # BLD AUTO: 0.04 K/UL (ref 0–0.3)
IMM GRANULOCYTES NFR BLD: 1 %
LYMPHOCYTES NFR BLD: 1.66 K/UL (ref 1.1–3.7)
LYMPHOCYTES RELATIVE PERCENT: 22 % (ref 24–43)
MCH RBC QN AUTO: 28.6 PG (ref 25.2–33.5)
MCHC RBC AUTO-ENTMCNC: 30.4 G/DL (ref 28.4–34.8)
MCV RBC AUTO: 94.2 FL (ref 82.6–102.9)
MONOCYTES NFR BLD: 0.74 K/UL (ref 0.1–1.2)
MONOCYTES NFR BLD: 10 % (ref 3–12)
NEUTROPHILS NFR BLD: 60 % (ref 36–65)
NEUTS SEG NFR BLD: 4.46 K/UL (ref 1.5–8.1)
NRBC BLD-RTO: 0 PER 100 WBC
PLATELET # BLD AUTO: ABNORMAL K/UL (ref 138–453)
PLATELET, FLUORESCENCE: 187 K/UL (ref 138–453)
PLATELETS.RETICULATED NFR BLD AUTO: 15.7 % (ref 1.1–10.3)
POTASSIUM SERPL-SCNC: 3.9 MMOL/L (ref 3.7–5.3)
RBC # BLD AUTO: 2.76 M/UL (ref 3.95–5.11)
RBC # BLD: ABNORMAL 10*6/UL
SODIUM SERPL-SCNC: 137 MMOL/L (ref 136–145)
VANCOMYCIN TROUGH SERPL-MCNC: 12.4 UG/ML (ref 10–20)
WBC OTHER # BLD: 7.5 K/UL (ref 3.5–11.3)

## 2024-03-25 PROCEDURE — 80202 ASSAY OF VANCOMYCIN: CPT

## 2024-03-25 PROCEDURE — 85025 COMPLETE CBC W/AUTO DIFF WBC: CPT

## 2024-03-25 PROCEDURE — 80048 BASIC METABOLIC PNL TOTAL CA: CPT

## 2024-03-25 PROCEDURE — 85055 RETICULATED PLATELET ASSAY: CPT

## 2024-03-25 PROCEDURE — P9603 ONE-WAY ALLOW PRORATED MILES: HCPCS

## 2024-03-25 PROCEDURE — 36415 COLL VENOUS BLD VENIPUNCTURE: CPT

## 2024-03-26 ENCOUNTER — OUTSIDE SERVICES (OUTPATIENT)
Dept: INFECTIOUS DISEASES | Age: 84
End: 2024-03-26
Payer: MEDICARE

## 2024-03-26 VITALS
SYSTOLIC BLOOD PRESSURE: 126 MMHG | OXYGEN SATURATION: 96 % | RESPIRATION RATE: 16 BRPM | HEART RATE: 72 BPM | TEMPERATURE: 97.9 F | DIASTOLIC BLOOD PRESSURE: 78 MMHG

## 2024-03-26 DIAGNOSIS — D72.10 EOSINOPHILIA, UNSPECIFIED TYPE: ICD-10-CM

## 2024-03-26 DIAGNOSIS — T81.49XA METHICILLIN-RESISTANT STAPHYLOCOCCUS AUREUS INFECTION OF POSTOPERATIVE WOUND: Primary | ICD-10-CM

## 2024-03-26 DIAGNOSIS — Z88.1 ALLERGY TO MULTIPLE ANTIBIOTICS: ICD-10-CM

## 2024-03-26 DIAGNOSIS — B95.62 METHICILLIN-RESISTANT STAPHYLOCOCCUS AUREUS INFECTION OF POSTOPERATIVE WOUND: Primary | ICD-10-CM

## 2024-03-26 DIAGNOSIS — B37.81 THRUSH OF MOUTH AND ESOPHAGUS (HCC): ICD-10-CM

## 2024-03-26 DIAGNOSIS — B37.0 THRUSH OF MOUTH AND ESOPHAGUS (HCC): ICD-10-CM

## 2024-03-26 PROCEDURE — 99309 SBSQ NF CARE MODERATE MDM 30: CPT | Performed by: NURSE PRACTITIONER

## 2024-03-26 NOTE — PROGRESS NOTES
[facility] -   Infectious diseases evaluation  admission date 3/12/2024    reason for consultation:   Antibiotic Management    Impression :   Current:  Left  knee postoperative wound infection /cellulitis  3/8    S/p I & D with application of Vancomycin powder and wound vac.  Hardware was retained.  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  Allergies to multiple antibiotics.  Chronic reeves catheter/changed 3/6/24.    Other:    Discussion / summary of stay / plan of care/ Recommendations:     HENCE:   Vancomycin IV, pharmacy to dose x 6 weeks through 4/24/24.  Nystatin swish and swallow QID while on antibiotics.  Follow CBC and renal function closely.  Wound care.  Supportive care.  Discussed with patient.    Infection Control Recommendations   [place] Precautions  Contact Isolation     Antimicrobial Stewardship Recommendations   Simplification of therapy  Targeted therapy    History of Present Illness:   Initial history:  Komal Crawford is a 83 y.o.-year-old female s/p left total knee arthroplasty revision on 1/23/24 who presented to [place] on 3/6/24 with left knee incision wound with swelling, warmth and pain with associated fever of 102.6.  Patient underwent I & D of the left knee, application of Vancomycin powder and wound vac placement on 3/8/24.  Intra-operative wound culture grew MRSA.  Treated with Vancomycin IV while at [place]. Discharged to [facility] on 3/12/24 with plan for Vancomycin IV through 4/24/24.    Interval changes  3/26/2024   Sitting up in wc.  Feeling good today.  Left knee with wound vac.  Minimal swelling.  RUE PICC site clean.  Denies any fever, chills, n/v/d.  Denies any difficulty swallowing 2/2 thrush.      Summary of relevant labs:  Labs:  WBC: 6.7-6.5-7.5  Eosinophils: 9-7  Cre: 0.5-0.6  Vanco: 12.4    Micro:  3/8 Intra-op Wound cx:

## 2024-03-27 ENCOUNTER — TELEPHONE (OUTPATIENT)
Dept: ORTHOPEDIC SURGERY | Age: 84
End: 2024-03-27

## 2024-03-27 NOTE — TELEPHONE ENCOUNTER
Received a call from a nurse at [facility] working with the patient. She stated that when was removing the patient's current wound vac, it appears that the sutures aren't secure and she can see metal. Per Dr. Keyes, the nurse was advised to place a new wound vac system. Also scheduled the patient for Monday 4/1 to come in to see Dr. Keyes and discuss additional surgery.

## 2024-03-28 ENCOUNTER — HOSPITAL ENCOUNTER (OUTPATIENT)
Dept: PREADMISSION TESTING | Age: 84
Discharge: HOME | End: 2024-04-01

## 2024-03-28 NOTE — DISCHARGE INSTRUCTIONS
[facility] WOUND and HYPERBARIC TREATMENT  CENTER                            Visit  Discharge Instructions / Physician Orders  DATE: 3/29/24     Home Care: Patient is currently at [facility]     SUPPLIES ORDERED THRU:                     DATE LAST SUPPLIED     Wound Location:  Left Knee     Cleanse with: Liquid antibacterial soap and water, rinse well      Dressing Orders:  Primary dressing   VASHE MOIST TO MOIST IN WOUND CARE TODAY                                                          CONTINUE NEG PRESSURE SYSTEM AS ORDER BY ORTHO( DR. RAZA)  Frequency:  CHANGE MWF     Additional Orders: Increase protein to diet (meat, cheese, eggs, fish, peanut butter, nuts and beans)  Multivitamin daily     OFFLOADING [] YES  TYPE:                  [x] NA     Weekly wound care visits until determined otherwise.     Antibiotic therapy-wound care related YES [x] NO [] NA[]    vancomycin (VANCOCIN) infusion [[id number]]    Order Details  Dose: 1,250 mg Route: IntraVENous Frequency: EVERY 24 HOURS   Dispense Quantity: 54 g Refills: 0             Sig: Infuse 1,250 mg intravenously every 24 hours Compound per protocol.           Start Date: 03/12/24 End Date: 04/24/24 after 43 doses      MY CHART []     Smart Device  []      HYPERBARIC TREATMENT-                TREATMENT #                          Your next appointment with the Wound Care Center is in 1 week WITH DR. SELLERS                                                                                                   (Please note your next appointment above and if you are unable to keep, kindly give a 24 hour notice. Thank you.)  If more than 15 min late we cannot guarantee you will be seen due to clinician schedule  Per Policy, Excessive cancellation will call for dismissal from program.  If you experience any of the following, please call the Wound Care Center during business hours:  [phone]     * Increase in Pain  * Temperature over 101  * Increase in drainage from

## 2024-03-29 ENCOUNTER — HOSPITAL ENCOUNTER (OUTPATIENT)
Dept: WOUND CARE | Age: 84
Discharge: HOME | End: 2024-03-29
Payer: MEDICARE

## 2024-03-29 VITALS
DIASTOLIC BLOOD PRESSURE: 90 MMHG | WEIGHT: 166 LBS | HEIGHT: 58 IN | SYSTOLIC BLOOD PRESSURE: 161 MMHG | HEART RATE: 81 BPM | RESPIRATION RATE: 16 BRPM | TEMPERATURE: 98 F | BODY MASS INDEX: 34.85 KG/M2

## 2024-03-29 DIAGNOSIS — S81.002A OPEN WOUND OF LEFT KNEE, INITIAL ENCOUNTER: Primary | ICD-10-CM

## 2024-03-29 PROCEDURE — 11043 DBRDMT MUSC&/FSCA 1ST 20/<: CPT

## 2024-03-29 PROCEDURE — 11046 DBRDMT MUSC&/FSCA EA ADDL: CPT

## 2024-03-29 RX ORDER — CLOBETASOL PROPIONATE 0.5 MG/G
OINTMENT TOPICAL ONCE
OUTPATIENT
Start: 2024-03-29 | End: 2024-03-29

## 2024-03-29 RX ORDER — SODIUM CHLOR/HYPOCHLOROUS ACID 0.033 %
SOLUTION, IRRIGATION IRRIGATION ONCE
OUTPATIENT
Start: 2024-03-29 | End: 2024-03-29

## 2024-03-29 RX ORDER — LIDOCAINE 50 MG/G
OINTMENT TOPICAL ONCE
OUTPATIENT
Start: 2024-03-29 | End: 2024-03-29

## 2024-03-29 RX ORDER — GENTAMICIN SULFATE 1 MG/G
OINTMENT TOPICAL ONCE
OUTPATIENT
Start: 2024-03-29 | End: 2024-03-29

## 2024-03-29 RX ORDER — GINSENG 100 MG
CAPSULE ORAL ONCE
OUTPATIENT
Start: 2024-03-29 | End: 2024-03-29

## 2024-03-29 RX ORDER — IBUPROFEN 200 MG
TABLET ORAL ONCE
OUTPATIENT
Start: 2024-03-29 | End: 2024-03-29

## 2024-03-29 RX ORDER — BACITRACIN ZINC AND POLYMYXIN B SULFATE 500; 1000 [USP'U]/G; [USP'U]/G
OINTMENT TOPICAL ONCE
OUTPATIENT
Start: 2024-03-29 | End: 2024-03-29

## 2024-03-29 RX ORDER — LIDOCAINE HYDROCHLORIDE 40 MG/ML
SOLUTION TOPICAL ONCE
Status: COMPLETED | OUTPATIENT
Start: 2024-03-29 | End: 2024-03-29

## 2024-03-29 RX ORDER — LIDOCAINE HYDROCHLORIDE 20 MG/ML
JELLY TOPICAL ONCE
OUTPATIENT
Start: 2024-03-29 | End: 2024-03-29

## 2024-03-29 RX ORDER — LIDOCAINE 40 MG/G
CREAM TOPICAL ONCE
OUTPATIENT
Start: 2024-03-29 | End: 2024-03-29

## 2024-03-29 RX ORDER — TRIAMCINOLONE ACETONIDE 1 MG/G
OINTMENT TOPICAL ONCE
OUTPATIENT
Start: 2024-03-29 | End: 2024-03-29

## 2024-03-29 RX ORDER — BETAMETHASONE DIPROPIONATE 0.5 MG/G
CREAM TOPICAL ONCE
OUTPATIENT
Start: 2024-03-29 | End: 2024-03-29

## 2024-03-29 RX ORDER — LIDOCAINE HYDROCHLORIDE 40 MG/ML
SOLUTION TOPICAL ONCE
OUTPATIENT
Start: 2024-03-29 | End: 2024-03-29

## 2024-03-29 RX ADMIN — LIDOCAINE HYDROCHLORIDE 15 ML: 40 SOLUTION TOPICAL at 09:05

## 2024-03-29 ASSESSMENT — PAIN SCALES - GENERAL: PAINLEVEL_OUTOF10: 0

## 2024-03-29 NOTE — PROGRESS NOTES
[facility] Wound Care Center   Progress Note and Procedure Note      Komal Crawford  MEDICAL RECORD NUMBER:  [id number]  AGE: 83 y.o.   GENDER: female  : 1940  EPISODE DATE:  3/29/2024    Subjective:     Chief Complaint   Patient presents with    Wound Check     Left knee         HISTORY of PRESENT ILLNESS HPI     Komal Crawford is a 83 y.o. female who presents today for wound/ulcer evaluation.   History of Wound Context: The patient is here for left knee open wound with prosthesis exposure.  No purulent drainage, no surrounding redness.  She denied significant pain, no fever or chills, no new complaints.  Thrush resolved    The patient was hospitalized at [facility] 3/6/2024  through 2024 for left knee wound and TKA infection status post irrigation and debridement with application of vancomycin powder and wound VAC with hardware retention.  The patient was discharged on IV vancomycin through 2024  She received a course of meropenem 3/6/2024 through 3/11/2024 for UTI      Ulcer Identification:  Ulcer Type: non-healing surgical    Contributing Factors: diabetes and decreased mobility    Status post left total hip arthroplasty/displaced periprosthetic femur fracture status post left total knee arthroplasty revision 24   Cephalosporin, sulfa penicillin, Cipro, doxycycline and clindamycin allergy   PAST MEDICAL HISTORY        Diagnosis Date    Age-related cognitive decline     Ankle pain     Left ankle fracture in ortho boat.    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    Caffeine use     2 coffee/day, 1-2 tea per week    Cerebral artery occlusion with cerebral infarction (HCC)     Chronic back pain     Chronic ITP (idiopathic thrombocytopenia) (HCC)     CVA (cerebral infarction) ,     balance issues, short term memory loss    Diabetic gastroparesis (HCC)     Diverticular

## 2024-04-01 ENCOUNTER — OFFICE VISIT (OUTPATIENT)
Dept: ORTHOPEDIC SURGERY | Age: 84
End: 2024-04-01

## 2024-04-01 ENCOUNTER — ANESTHESIA EVENT (OUTPATIENT)
Dept: OPERATING ROOM | Age: 84
End: 2024-04-01
Payer: MEDICARE

## 2024-04-01 ENCOUNTER — HOSPITAL ENCOUNTER (OUTPATIENT)
Age: 84
Setting detail: SPECIMEN
Discharge: HOME | End: 2024-04-01

## 2024-04-01 DIAGNOSIS — Z96.652 HISTORY OF TOTAL KNEE ARTHROPLASTY, LEFT: Primary | ICD-10-CM

## 2024-04-01 LAB
ANION GAP SERPL CALCULATED.3IONS-SCNC: 11 MMOL/L (ref 9–16)
BASOPHILS # BLD: 0.1 K/UL (ref 0–0.2)
BASOPHILS NFR BLD: 1 % (ref 0–2)
BUN SERPL-MCNC: 8 MG/DL (ref 8–23)
CALCIUM SERPL-MCNC: 8.3 MG/DL (ref 8.6–10.4)
CHLORIDE SERPL-SCNC: 103 MMOL/L (ref 98–107)
CO2 SERPL-SCNC: 21 MMOL/L (ref 20–31)
CREAT SERPL-MCNC: 0.5 MG/DL (ref 0.5–0.9)
EOSINOPHIL # BLD: 0.59 K/UL (ref 0–0.44)
EOSINOPHILS RELATIVE PERCENT: 7 % (ref 1–4)
ERYTHROCYTE [DISTWIDTH] IN BLOOD BY AUTOMATED COUNT: 15.2 % (ref 11.8–14.4)
GFR SERPL CREATININE-BSD FRML MDRD: >90 ML/MIN/1.73M2
GLUCOSE SERPL-MCNC: 119 MG/DL (ref 74–99)
HCT VFR BLD AUTO: 26.7 % (ref 36.3–47.1)
HGB BLD-MCNC: 8.4 G/DL (ref 11.9–15.1)
IMM GRANULOCYTES # BLD AUTO: 0.03 K/UL (ref 0–0.3)
IMM GRANULOCYTES NFR BLD: 0 %
LYMPHOCYTES NFR BLD: 1.54 K/UL (ref 1.1–3.7)
LYMPHOCYTES RELATIVE PERCENT: 19 % (ref 24–43)
MCH RBC QN AUTO: 28.3 PG (ref 25.2–33.5)
MCHC RBC AUTO-ENTMCNC: 31.5 G/DL (ref 28.4–34.8)
MCV RBC AUTO: 89.9 FL (ref 82.6–102.9)
MONOCYTES NFR BLD: 0.89 K/UL (ref 0.1–1.2)
MONOCYTES NFR BLD: 11 % (ref 3–12)
NEUTROPHILS NFR BLD: 60 % (ref 36–65)
NEUTS SEG NFR BLD: 4.82 K/UL (ref 1.5–8.1)
NRBC BLD-RTO: 0 PER 100 WBC
PLATELET # BLD AUTO: 214 K/UL (ref 138–453)
PMV BLD AUTO: 14 FL (ref 8.1–13.5)
POTASSIUM SERPL-SCNC: 3.1 MMOL/L (ref 3.7–5.3)
RBC # BLD AUTO: 2.97 M/UL (ref 3.95–5.11)
RBC # BLD: ABNORMAL 10*6/UL
SODIUM SERPL-SCNC: 135 MMOL/L (ref 136–145)
WBC OTHER # BLD: 8 K/UL (ref 3.5–11.3)

## 2024-04-01 PROCEDURE — P9603 ONE-WAY ALLOW PRORATED MILES: HCPCS

## 2024-04-01 PROCEDURE — 36415 COLL VENOUS BLD VENIPUNCTURE: CPT

## 2024-04-01 PROCEDURE — 99024 POSTOP FOLLOW-UP VISIT: CPT | Performed by: ORTHOPAEDIC SURGERY

## 2024-04-01 PROCEDURE — 80048 BASIC METABOLIC PNL TOTAL CA: CPT

## 2024-04-01 PROCEDURE — 85025 COMPLETE CBC W/AUTO DIFF WBC: CPT

## 2024-04-01 NOTE — PROGRESS NOTES
Komal returns today she is status post distal femoral revision for periprosthetic femur fracture of the left knee.  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  She had a wound VAC applied at that time.  She has been following through wound care and states that they have been trying to continue with the wound care.  I spoke with Dr. Atwood the other day that states that the patient deep tissues are broken down and prosthesis is visible.  She is being seen today and she is already been scheduled for irrigation debridement and wound closure tomorrow for 224    Examination notes the patient is wound is obviously open.  She does have disruption of her quadriceps tendon from her previous procedure and prosthesis is visible.  No gross purulence is seen however.    Patient to be scheduled for irrigation and debridement as well as quadriceps tendon repair and primary wound closure.  Patient likely to be admitted for care and resumption of her IV antibiotics before being returned back to the nursing home

## 2024-04-01 NOTE — PROGRESS NOTES
Physician Progress Note      PATIENT:               COLLIN SYLVESTER  CSN #:                  [id number]  :                       1940  ADMIT DATE:       3/6/2024 3:17 PM  DISCH DATE:        3/12/2024 7:36 PM  RESPONDING  PROVIDER #:        Damian Keyes MD          QUERY TEXT:    Patient admitted with Infected total knee. Per Op note dated 3/8/24   documentation of debridement. To accurately reflect the procedure performed   please document if debridement was excisional or nonexcisional and the deepest   depth of tissue removed as down to and including:    The medical record reflects the following:  Risk Factors: 83 y.o. Female with Presence of right artificial knee joint  Clinical Indicators:  3/8 Op Note  \"Procedure open irrigation debridement left OSS total knee prosthesis with   irrigation with Bactisure and application of 2 g vancomycin powder and wound   VAC application\"    \"The previous arthrotomy was opened from the superior snip.  Down to the tibia   and obvious purulence was the was identified and deep cultures were taken.    This area was then copiously irrigated with pulsatile lavage.  As mentioned   earlier we cannot do a modular component exchange's as these were not   available within 1 days notice from the date of scheduling surgery and   therefore had to be retained.  We did after irrigation and then we also did   debridement with Ruthann as well as sharply.  We then utilized 1 L irrigation of   Bactisure and allowed for appropriate timing this and     Treatment: Procedure open irrigation debridement left OSS total knee   prosthesis with irrigation with Bactisure and application of 2 g vancomycin   powder and wound VAC application    Thank you,  Divya Shepherd, BSN, RN, CCRN, CRCR  Jabber: [phone]  I am also available via PS.  Options provided:  -- Excisional debridement of subcutaneous tissue  -- Excisional debridement of fascia  -- Excisional debridement of muscle  -- Excisional

## 2024-04-01 NOTE — PRE-PROCEDURE INSTRUCTIONS
No answer, left message ?                             Unable to leave message ?    When were you told to arrive at hospital ?  1300    Do you have a  ?    Are you on any blood thinners ?                     If yes when did you stop taking ?    Do you have your prep Rx filled and instruction ?      Nothing to eat the day before , only clear liquids.    Are you experiencing any covid symptoms ?     Do you have any infections or rash we should be aware of ?      Do you have the Hibiclens soap to use the night before and the morning of surgery ?    Nothing to eat or drink after midnight, only a sip of water to take any medication instructed to take the night before.  Wear comfortable clothing, leave any valuables at home, remove any jewelry and body piercing .   Talked with Irvin Martínez

## 2024-04-02 ENCOUNTER — OUTSIDE SERVICES (OUTPATIENT)
Dept: INFECTIOUS DISEASES | Age: 84
End: 2024-04-02
Payer: MEDICARE

## 2024-04-02 ENCOUNTER — ANESTHESIA (OUTPATIENT)
Dept: OPERATING ROOM | Age: 84
End: 2024-04-02
Payer: MEDICARE

## 2024-04-02 ENCOUNTER — HOSPITAL ENCOUNTER (INPATIENT)
Age: 84
LOS: 4 days | Discharge: NURSING, REHAB OR LONG-TERM CARE | DRG: 464 | End: 2024-04-06
Attending: ORTHOPAEDIC SURGERY | Admitting: ORTHOPAEDIC SURGERY
Payer: MEDICARE

## 2024-04-02 ENCOUNTER — HOSPITAL ENCOUNTER (OUTPATIENT)
Age: 84
Setting detail: SPECIMEN
Discharge: HOME | End: 2024-04-02

## 2024-04-02 DIAGNOSIS — B37.0 THRUSH OF MOUTH AND ESOPHAGUS (HCC): ICD-10-CM

## 2024-04-02 DIAGNOSIS — T81.49XA METHICILLIN-RESISTANT STAPHYLOCOCCUS AUREUS INFECTION OF POSTOPERATIVE WOUND: Primary | ICD-10-CM

## 2024-04-02 DIAGNOSIS — Z88.1 ALLERGY TO MULTIPLE ANTIBIOTICS: ICD-10-CM

## 2024-04-02 DIAGNOSIS — D72.10 EOSINOPHILIA, UNSPECIFIED TYPE: ICD-10-CM

## 2024-04-02 DIAGNOSIS — B95.62 METHICILLIN-RESISTANT STAPHYLOCOCCUS AUREUS INFECTION OF POSTOPERATIVE WOUND: Primary | ICD-10-CM

## 2024-04-02 DIAGNOSIS — M79.89 SWELLING OF RIGHT UPPER EXTREMITY: Primary | ICD-10-CM

## 2024-04-02 DIAGNOSIS — B37.81 THRUSH OF MOUTH AND ESOPHAGUS (HCC): ICD-10-CM

## 2024-04-02 PROBLEM — T84.54XS INFECTION OF TOTAL LEFT KNEE REPLACEMENT, SEQUELA: Status: ACTIVE | Noted: 2024-04-02

## 2024-04-02 LAB
GLUCOSE BLD-MCNC: 125 MG/DL (ref 65–105)
GLUCOSE BLD-MCNC: 136 MG/DL (ref 65–105)
GLUCOSE BLD-MCNC: 138 MG/DL (ref 65–105)
VANCOMYCIN TROUGH SERPL-MCNC: 13.4 UG/ML (ref 10–20)

## 2024-04-02 PROCEDURE — 3700000001 HC ADD 15 MINUTES (ANESTHESIA): Performed by: ORTHOPAEDIC SURGERY

## 2024-04-02 PROCEDURE — 36415 COLL VENOUS BLD VENIPUNCTURE: CPT

## 2024-04-02 PROCEDURE — 6370000000 HC RX 637 (ALT 250 FOR IP): Performed by: ORTHOPAEDIC SURGERY

## 2024-04-02 PROCEDURE — P9603 ONE-WAY ALLOW PRORATED MILES: HCPCS

## 2024-04-02 PROCEDURE — 2500000003 HC RX 250 WO HCPCS: Performed by: NURSE ANESTHETIST, CERTIFIED REGISTERED

## 2024-04-02 PROCEDURE — 2580000003 HC RX 258: Performed by: ORTHOPAEDIC SURGERY

## 2024-04-02 PROCEDURE — 27385 REPAIR OF THIGH MUSCLE: CPT | Performed by: ORTHOPAEDIC SURGERY

## 2024-04-02 PROCEDURE — 6370000000 HC RX 637 (ALT 250 FOR IP): Performed by: ANESTHESIOLOGY

## 2024-04-02 PROCEDURE — 80202 ASSAY OF VANCOMYCIN: CPT

## 2024-04-02 PROCEDURE — 0LQR0ZZ REPAIR LEFT KNEE TENDON, OPEN APPROACH: ICD-10-PCS | Performed by: ORTHOPAEDIC SURGERY

## 2024-04-02 PROCEDURE — 99309 SBSQ NF CARE MODERATE MDM 30: CPT | Performed by: NURSE PRACTITIONER

## 2024-04-02 PROCEDURE — 7100000001 HC PACU RECOVERY - ADDTL 15 MIN: Performed by: ORTHOPAEDIC SURGERY

## 2024-04-02 PROCEDURE — 51702 INSERT TEMP BLADDER CATH: CPT

## 2024-04-02 PROCEDURE — 6370000000 HC RX 637 (ALT 250 FOR IP): Performed by: NURSE PRACTITIONER

## 2024-04-02 PROCEDURE — 3600000013 HC SURGERY LEVEL 3 ADDTL 15MIN: Performed by: ORTHOPAEDIC SURGERY

## 2024-04-02 PROCEDURE — 0JBP0ZZ EXCISION OF LEFT LOWER LEG SUBCUTANEOUS TISSUE AND FASCIA, OPEN APPROACH: ICD-10-PCS | Performed by: ORTHOPAEDIC SURGERY

## 2024-04-02 PROCEDURE — 7100000000 HC PACU RECOVERY - FIRST 15 MIN: Performed by: ORTHOPAEDIC SURGERY

## 2024-04-02 PROCEDURE — 6360000002 HC RX W HCPCS: Performed by: ORTHOPAEDIC SURGERY

## 2024-04-02 PROCEDURE — 2709999900 HC NON-CHARGEABLE SUPPLY: Performed by: ORTHOPAEDIC SURGERY

## 2024-04-02 PROCEDURE — 3600000003 HC SURGERY LEVEL 3 BASE: Performed by: ORTHOPAEDIC SURGERY

## 2024-04-02 PROCEDURE — 6360000002 HC RX W HCPCS: Performed by: NURSE ANESTHETIST, CERTIFIED REGISTERED

## 2024-04-02 PROCEDURE — 3700000000 HC ANESTHESIA ATTENDED CARE: Performed by: ORTHOPAEDIC SURGERY

## 2024-04-02 PROCEDURE — 6360000002 HC RX W HCPCS: Performed by: ANESTHESIOLOGY

## 2024-04-02 PROCEDURE — 10180 I&D COMPLEX PO WOUND INFCTJ: CPT | Performed by: ORTHOPAEDIC SURGERY

## 2024-04-02 PROCEDURE — 2580000003 HC RX 258: Performed by: ANESTHESIOLOGY

## 2024-04-02 PROCEDURE — 82947 ASSAY GLUCOSE BLOOD QUANT: CPT

## 2024-04-02 PROCEDURE — 1200000000 HC SEMI PRIVATE

## 2024-04-02 PROCEDURE — 2720000010 HC SURG SUPPLY STERILE: Performed by: ORTHOPAEDIC SURGERY

## 2024-04-02 RX ORDER — ACETAMINOPHEN 325 MG/1
650 TABLET ORAL EVERY 6 HOURS
Status: DISCONTINUED | OUTPATIENT
Start: 2024-04-02 | End: 2024-04-06 | Stop reason: HOSPADM

## 2024-04-02 RX ORDER — SODIUM CHLORIDE 0.9 % (FLUSH) 0.9 %
5-40 SYRINGE (ML) INJECTION EVERY 12 HOURS SCHEDULED
Status: DISCONTINUED | OUTPATIENT
Start: 2024-04-02 | End: 2024-04-06 | Stop reason: HOSPADM

## 2024-04-02 RX ORDER — OXYCODONE HYDROCHLORIDE 5 MG/1
5 TABLET ORAL EVERY 4 HOURS PRN
Status: DISCONTINUED | OUTPATIENT
Start: 2024-04-02 | End: 2024-04-03

## 2024-04-02 RX ORDER — PANTOPRAZOLE SODIUM 40 MG/1
40 TABLET, DELAYED RELEASE ORAL
Status: DISCONTINUED | OUTPATIENT
Start: 2024-04-03 | End: 2024-04-06 | Stop reason: HOSPADM

## 2024-04-02 RX ORDER — VANCOMYCIN HYDROCHLORIDE 1 G/20ML
INJECTION, POWDER, LYOPHILIZED, FOR SOLUTION INTRAVENOUS PRN
Status: DISCONTINUED | OUTPATIENT
Start: 2024-04-02 | End: 2024-04-02 | Stop reason: ALTCHOICE

## 2024-04-02 RX ORDER — METOCLOPRAMIDE HYDROCHLORIDE 5 MG/ML
10 INJECTION INTRAMUSCULAR; INTRAVENOUS
Status: DISCONTINUED | OUTPATIENT
Start: 2024-04-02 | End: 2024-04-02 | Stop reason: HOSPADM

## 2024-04-02 RX ORDER — ROPINIROLE 2 MG/1
4 TABLET, FILM COATED ORAL NIGHTLY
Status: DISCONTINUED | OUTPATIENT
Start: 2024-04-02 | End: 2024-04-06 | Stop reason: HOSPADM

## 2024-04-02 RX ORDER — TIZANIDINE 2 MG/1
2 TABLET ORAL NIGHTLY PRN
Status: DISCONTINUED | OUTPATIENT
Start: 2024-04-02 | End: 2024-04-06 | Stop reason: HOSPADM

## 2024-04-02 RX ORDER — RANOLAZINE 500 MG/1
500 TABLET, EXTENDED RELEASE ORAL 2 TIMES DAILY
Status: DISCONTINUED | OUTPATIENT
Start: 2024-04-02 | End: 2024-04-06 | Stop reason: HOSPADM

## 2024-04-02 RX ORDER — INSULIN LISPRO 100 [IU]/ML
0-4 INJECTION, SOLUTION INTRAVENOUS; SUBCUTANEOUS NIGHTLY
Status: DISCONTINUED | OUTPATIENT
Start: 2024-04-02 | End: 2024-04-06 | Stop reason: HOSPADM

## 2024-04-02 RX ORDER — SODIUM CHLORIDE 9 MG/ML
INJECTION, SOLUTION INTRAVENOUS PRN
Status: DISCONTINUED | OUTPATIENT
Start: 2024-04-02 | End: 2024-04-06 | Stop reason: HOSPADM

## 2024-04-02 RX ORDER — CLOPIDOGREL BISULFATE 75 MG/1
75 TABLET ORAL DAILY
Status: DISCONTINUED | OUTPATIENT
Start: 2024-04-02 | End: 2024-04-06 | Stop reason: HOSPADM

## 2024-04-02 RX ORDER — LABETALOL HYDROCHLORIDE 5 MG/ML
10 INJECTION, SOLUTION INTRAVENOUS
Status: DISCONTINUED | OUTPATIENT
Start: 2024-04-02 | End: 2024-04-02 | Stop reason: HOSPADM

## 2024-04-02 RX ORDER — MEPERIDINE HYDROCHLORIDE 25 MG/ML
12.5 INJECTION INTRAMUSCULAR; INTRAVENOUS; SUBCUTANEOUS EVERY 5 MIN PRN
Status: DISCONTINUED | OUTPATIENT
Start: 2024-04-02 | End: 2024-04-02 | Stop reason: HOSPADM

## 2024-04-02 RX ORDER — SODIUM CHLORIDE 9 MG/ML
INJECTION, SOLUTION INTRAVENOUS PRN
Status: DISCONTINUED | OUTPATIENT
Start: 2024-04-02 | End: 2024-04-02 | Stop reason: HOSPADM

## 2024-04-02 RX ORDER — HYDRALAZINE HYDROCHLORIDE 20 MG/ML
10 INJECTION INTRAMUSCULAR; INTRAVENOUS
Status: DISCONTINUED | OUTPATIENT
Start: 2024-04-02 | End: 2024-04-02 | Stop reason: HOSPADM

## 2024-04-02 RX ORDER — SENNOSIDES A AND B 8.6 MG/1
1 TABLET, FILM COATED ORAL 2 TIMES DAILY
Status: DISCONTINUED | OUTPATIENT
Start: 2024-04-02 | End: 2024-04-06 | Stop reason: HOSPADM

## 2024-04-02 RX ORDER — HYDRALAZINE HYDROCHLORIDE 25 MG/1
25 TABLET, FILM COATED ORAL DAILY
Status: DISCONTINUED | OUTPATIENT
Start: 2024-04-02 | End: 2024-04-04

## 2024-04-02 RX ORDER — NITROFURANTOIN 25; 75 MG/1; MG/1
100 CAPSULE ORAL EVERY 12 HOURS SCHEDULED
Status: DISCONTINUED | OUTPATIENT
Start: 2024-04-02 | End: 2024-04-03

## 2024-04-02 RX ORDER — LIDOCAINE HYDROCHLORIDE 10 MG/ML
1 INJECTION, SOLUTION EPIDURAL; INFILTRATION; INTRACAUDAL; PERINEURAL
Status: DISCONTINUED | OUTPATIENT
Start: 2024-04-02 | End: 2024-04-02 | Stop reason: HOSPADM

## 2024-04-02 RX ORDER — ATORVASTATIN CALCIUM 10 MG/1
10 TABLET, FILM COATED ORAL DAILY
Status: DISCONTINUED | OUTPATIENT
Start: 2024-04-02 | End: 2024-04-06 | Stop reason: HOSPADM

## 2024-04-02 RX ORDER — INSULIN LISPRO 100 [IU]/ML
0-8 INJECTION, SOLUTION INTRAVENOUS; SUBCUTANEOUS
Status: DISCONTINUED | OUTPATIENT
Start: 2024-04-03 | End: 2024-04-06 | Stop reason: HOSPADM

## 2024-04-02 RX ORDER — OXYCODONE HYDROCHLORIDE 10 MG/1
10 TABLET ORAL EVERY 4 HOURS PRN
Status: DISCONTINUED | OUTPATIENT
Start: 2024-04-02 | End: 2024-04-03

## 2024-04-02 RX ORDER — PRIMIDONE 50 MG/1
50 TABLET ORAL 2 TIMES DAILY
Status: DISCONTINUED | OUTPATIENT
Start: 2024-04-02 | End: 2024-04-06 | Stop reason: HOSPADM

## 2024-04-02 RX ORDER — SODIUM CHLORIDE 0.9 % (FLUSH) 0.9 %
5-40 SYRINGE (ML) INJECTION PRN
Status: DISCONTINUED | OUTPATIENT
Start: 2024-04-02 | End: 2024-04-02 | Stop reason: HOSPADM

## 2024-04-02 RX ORDER — ACETAMINOPHEN 500 MG
1000 TABLET ORAL ONCE
Status: COMPLETED | OUTPATIENT
Start: 2024-04-02 | End: 2024-04-02

## 2024-04-02 RX ORDER — GABAPENTIN 100 MG/1
100 CAPSULE ORAL ONCE
Status: COMPLETED | OUTPATIENT
Start: 2024-04-02 | End: 2024-04-02

## 2024-04-02 RX ORDER — ONDANSETRON 2 MG/ML
4 INJECTION INTRAMUSCULAR; INTRAVENOUS
Status: DISCONTINUED | OUTPATIENT
Start: 2024-04-02 | End: 2024-04-02 | Stop reason: HOSPADM

## 2024-04-02 RX ORDER — FENTANYL CITRATE 0.05 MG/ML
25 INJECTION, SOLUTION INTRAMUSCULAR; INTRAVENOUS EVERY 5 MIN PRN
Status: DISCONTINUED | OUTPATIENT
Start: 2024-04-02 | End: 2024-04-02 | Stop reason: HOSPADM

## 2024-04-02 RX ORDER — ACETAMINOPHEN 325 MG/1
650 TABLET ORAL
Status: DISCONTINUED | OUTPATIENT
Start: 2024-04-02 | End: 2024-04-02 | Stop reason: HOSPADM

## 2024-04-02 RX ORDER — ENOXAPARIN SODIUM 100 MG/ML
40 INJECTION SUBCUTANEOUS DAILY
Status: DISCONTINUED | OUTPATIENT
Start: 2024-04-02 | End: 2024-04-06 | Stop reason: HOSPADM

## 2024-04-02 RX ORDER — FENTANYL CITRATE 50 UG/ML
INJECTION, SOLUTION INTRAMUSCULAR; INTRAVENOUS PRN
Status: DISCONTINUED | OUTPATIENT
Start: 2024-04-02 | End: 2024-04-02 | Stop reason: SDUPTHER

## 2024-04-02 RX ORDER — INSULIN GLARGINE 100 [IU]/ML
10 INJECTION, SOLUTION SUBCUTANEOUS NIGHTLY
Status: DISCONTINUED | OUTPATIENT
Start: 2024-04-02 | End: 2024-04-06 | Stop reason: HOSPADM

## 2024-04-02 RX ORDER — SODIUM CHLORIDE 0.9 % (FLUSH) 0.9 %
5-40 SYRINGE (ML) INJECTION PRN
Status: DISCONTINUED | OUTPATIENT
Start: 2024-04-02 | End: 2024-04-06 | Stop reason: HOSPADM

## 2024-04-02 RX ORDER — DEXTROSE MONOHYDRATE 100 MG/ML
INJECTION, SOLUTION INTRAVENOUS CONTINUOUS PRN
Status: DISCONTINUED | OUTPATIENT
Start: 2024-04-02 | End: 2024-04-06 | Stop reason: HOSPADM

## 2024-04-02 RX ORDER — ASPIRIN 81 MG/1
81 TABLET, CHEWABLE ORAL DAILY
Status: DISCONTINUED | OUTPATIENT
Start: 2024-04-02 | End: 2024-04-06 | Stop reason: HOSPADM

## 2024-04-02 RX ORDER — SODIUM CHLORIDE 0.9 % (FLUSH) 0.9 %
5-40 SYRINGE (ML) INJECTION EVERY 12 HOURS SCHEDULED
Status: DISCONTINUED | OUTPATIENT
Start: 2024-04-02 | End: 2024-04-02 | Stop reason: HOSPADM

## 2024-04-02 RX ORDER — SODIUM CHLORIDE, SODIUM LACTATE, POTASSIUM CHLORIDE, CALCIUM CHLORIDE 600; 310; 30; 20 MG/100ML; MG/100ML; MG/100ML; MG/100ML
INJECTION, SOLUTION INTRAVENOUS CONTINUOUS
Status: DISCONTINUED | OUTPATIENT
Start: 2024-04-02 | End: 2024-04-04

## 2024-04-02 RX ORDER — NALOXONE HYDROCHLORIDE 0.4 MG/ML
INJECTION, SOLUTION INTRAMUSCULAR; INTRAVENOUS; SUBCUTANEOUS PRN
Status: DISCONTINUED | OUTPATIENT
Start: 2024-04-02 | End: 2024-04-02 | Stop reason: HOSPADM

## 2024-04-02 RX ORDER — MIDODRINE HYDROCHLORIDE 2.5 MG/1
2.5 TABLET ORAL 3 TIMES DAILY PRN
Status: DISCONTINUED | OUTPATIENT
Start: 2024-04-02 | End: 2024-04-06 | Stop reason: HOSPADM

## 2024-04-02 RX ORDER — PROPOFOL 10 MG/ML
INJECTION, EMULSION INTRAVENOUS PRN
Status: DISCONTINUED | OUTPATIENT
Start: 2024-04-02 | End: 2024-04-02 | Stop reason: SDUPTHER

## 2024-04-02 RX ORDER — LIDOCAINE HYDROCHLORIDE 20 MG/ML
INJECTION, SOLUTION EPIDURAL; INFILTRATION; INTRACAUDAL; PERINEURAL PRN
Status: DISCONTINUED | OUTPATIENT
Start: 2024-04-02 | End: 2024-04-02 | Stop reason: SDUPTHER

## 2024-04-02 RX ORDER — DIPHENHYDRAMINE HYDROCHLORIDE 50 MG/ML
12.5 INJECTION INTRAMUSCULAR; INTRAVENOUS
Status: DISCONTINUED | OUTPATIENT
Start: 2024-04-02 | End: 2024-04-02 | Stop reason: HOSPADM

## 2024-04-02 RX ORDER — SODIUM CHLORIDE 9 MG/ML
INJECTION, SOLUTION INTRAVENOUS CONTINUOUS
Status: DISCONTINUED | OUTPATIENT
Start: 2024-04-02 | End: 2024-04-02 | Stop reason: HOSPADM

## 2024-04-02 RX ADMIN — FENTANYL CITRATE 50 MCG: 50 INJECTION, SOLUTION INTRAMUSCULAR; INTRAVENOUS at 14:45

## 2024-04-02 RX ADMIN — FENTANYL CITRATE 50 MCG: 50 INJECTION, SOLUTION INTRAMUSCULAR; INTRAVENOUS at 16:36

## 2024-04-02 RX ADMIN — NITROFURANTOIN MONOHYDRATE/MACROCRYSTALS 100 MG: 75; 25 CAPSULE ORAL at 21:20

## 2024-04-02 RX ADMIN — ROPINIROLE HYDROCHLORIDE 4 MG: 2 TABLET, FILM COATED ORAL at 22:22

## 2024-04-02 RX ADMIN — TIZANIDINE 2 MG: 2 TABLET ORAL at 21:20

## 2024-04-02 RX ADMIN — ATORVASTATIN CALCIUM 10 MG: 10 TABLET, FILM COATED ORAL at 22:21

## 2024-04-02 RX ADMIN — SODIUM CHLORIDE, POTASSIUM CHLORIDE, SODIUM LACTATE AND CALCIUM CHLORIDE: 600; 310; 30; 20 INJECTION, SOLUTION INTRAVENOUS at 18:38

## 2024-04-02 RX ADMIN — FENTANYL CITRATE 50 MCG: 50 INJECTION, SOLUTION INTRAMUSCULAR; INTRAVENOUS at 16:23

## 2024-04-02 RX ADMIN — ACETAMINOPHEN 1000 MG: 500 TABLET ORAL at 14:00

## 2024-04-02 RX ADMIN — SODIUM CHLORIDE: 9 INJECTION, SOLUTION INTRAVENOUS at 14:01

## 2024-04-02 RX ADMIN — PRIMIDONE 50 MG: 50 TABLET ORAL at 22:21

## 2024-04-02 RX ADMIN — GABAPENTIN 100 MG: 100 CAPSULE ORAL at 14:00

## 2024-04-02 RX ADMIN — HYDROMORPHONE HYDROCHLORIDE 0.5 MG: 1 INJECTION, SOLUTION INTRAMUSCULAR; INTRAVENOUS; SUBCUTANEOUS at 17:01

## 2024-04-02 RX ADMIN — LIDOCAINE HYDROCHLORIDE 40 MG: 20 INJECTION, SOLUTION EPIDURAL; INFILTRATION; INTRACAUDAL; PERINEURAL at 14:45

## 2024-04-02 RX ADMIN — ENOXAPARIN SODIUM 40 MG: 100 INJECTION SUBCUTANEOUS at 22:22

## 2024-04-02 RX ADMIN — RANOLAZINE 500 MG: 500 TABLET, EXTENDED RELEASE ORAL at 21:20

## 2024-04-02 RX ADMIN — ACETAMINOPHEN 650 MG: 325 TABLET ORAL at 21:20

## 2024-04-02 RX ADMIN — PROPOFOL 100 MG: 10 INJECTION, EMULSION INTRAVENOUS at 14:45

## 2024-04-02 RX ADMIN — INSULIN GLARGINE 10 UNITS: 100 INJECTION, SOLUTION SUBCUTANEOUS at 21:21

## 2024-04-02 RX ADMIN — SENNOSIDES 8.6 MG: 8.6 TABLET, FILM COATED ORAL at 21:21

## 2024-04-02 RX ADMIN — FENTANYL CITRATE 50 MCG: 50 INJECTION, SOLUTION INTRAMUSCULAR; INTRAVENOUS at 16:12

## 2024-04-02 ASSESSMENT — PAIN DESCRIPTION - LOCATION
LOCATION: BUTTOCKS;LEG
LOCATION: KNEE

## 2024-04-02 ASSESSMENT — PAIN DESCRIPTION - DESCRIPTORS
DESCRIPTORS: BURNING

## 2024-04-02 ASSESSMENT — ENCOUNTER SYMPTOMS
ABDOMINAL PAIN: 0
NAUSEA: 0
SHORTNESS OF BREATH: 0
TROUBLE SWALLOWING: 0
COUGH: 0
DIARRHEA: 0
CONSTIPATION: 1
SORE THROAT: 0
BACK PAIN: 0
APNEA: 0
VOMITING: 0
ROS SKIN COMMENTS: SEE HPI

## 2024-04-02 ASSESSMENT — PAIN SCALES - GENERAL
PAINLEVEL_OUTOF10: 7
PAINLEVEL_OUTOF10: 3
PAINLEVEL_OUTOF10: 10
PAINLEVEL_OUTOF10: 3

## 2024-04-02 ASSESSMENT — PAIN DESCRIPTION - ORIENTATION
ORIENTATION: LEFT

## 2024-04-02 ASSESSMENT — PAIN - FUNCTIONAL ASSESSMENT
PAIN_FUNCTIONAL_ASSESSMENT: 0-10
PAIN_FUNCTIONAL_ASSESSMENT: 0-10

## 2024-04-02 NOTE — ANESTHESIA PRE PROCEDURE
04/01/2024 08:36 AM       CMP:   Lab Results   Component Value Date/Time     04/01/2024 08:36 AM    K 3.1 04/01/2024 08:36 AM     04/01/2024 08:36 AM    CO2 21 04/01/2024 08:36 AM    BUN 8 04/01/2024 08:36 AM    CREATININE 0.5 04/01/2024 08:36 AM    GFRAA 82.1 06/01/2023 08:59 AM    AGRATIO 1.0 03/14/2024 06:12 AM    LABGLOM >90 04/01/2024 08:36 AM    GLUCOSE 119 04/01/2024 08:36 AM    GLUCOSE 168 06/01/2023 08:59 AM    PROT 5.7 03/14/2024 06:12 AM    CALCIUM 8.3 04/01/2024 08:36 AM    BILITOT 0.4 03/14/2024 06:12 AM    ALKPHOS 77 03/14/2024 06:12 AM    AST 22 03/14/2024 06:12 AM    ALT 9 03/14/2024 06:12 AM       POC Tests:   Recent Labs     04/02/24  1343   POCGLU 136*         Coags:   Lab Results   Component Value Date/Time    PROTIME 17.0 03/06/2024 07:17 PM    INR 1.4 03/06/2024 07:17 PM    APTT 25.5 01/17/2024 11:45 PM       HCG (If Applicable): No results found for: \"PREGTESTUR\", \"PREGSERUM\", \"HCG\", \"HCGQUANT\"     ABGs: No results found for: \"PHART\", \"PO2ART\", \"HWV5CAP\", \"JUR2PQA\", \"BEART\", \"J1AKYQSC\"     Type & Screen (If Applicable):  No results found for: \"LABABO\", \"LABRH\"    Drug/Infectious Status (If Applicable):  No results found for: \"HIV\", \"HEPCAB\"    COVID-19 Screening (If Applicable):   Lab Results   Component Value Date/Time    COVID19       Not Detected   09/20/2022 12:22 PM    COVID19 Not Detected 11/03/2021 04:30 PM           Anesthesia Evaluation  Patient summary reviewed and Nursing notes reviewed  Airway: Mallampati: II  TM distance: >3 FB   Neck ROM: full  Mouth opening: > = 3 FB   Dental:    (+) upper dentures and partials  Comment: full on top, partial on bottom    Pulmonary:Negative Pulmonary ROS and normal exam  breath sounds clear to auscultation                             Cardiovascular:    (+) hypertension:, CAD:, CABG/stent (DAWSON):      ECG reviewed  Rhythm: regular  Rate: normal  Echocardiogram reviewed         Beta Blocker:  Not on Beta Blocker         Neuro/Psych:

## 2024-04-02 NOTE — PLAN OF CARE
Problem: Discharge Planning  Goal: Discharge to home or other facility with appropriate resources  Outcome: Progressing     Problem: Chronic Conditions and Co-morbidities  Goal: Patient's chronic conditions and co-morbidity symptoms are monitored and maintained or improved  Outcome: Progressing     Problem: Pain  Goal: Verbalizes/displays adequate comfort level or baseline comfort level  Outcome: Progressing     Problem: Skin/Tissue Integrity  Goal: Absence of new skin breakdown  Outcome: Progressing     Problem: ABCDS Injury Assessment  Goal: Absence of physical injury  Outcome: Progressing

## 2024-04-02 NOTE — PROGRESS NOTES
Writer contacted Dr. Keyes if he would like wound care consulted for pt due to wound vac. Per MD place order for wound eval and treat.

## 2024-04-02 NOTE — OP NOTE
Operative Note      Patient: Komal Crawford  YOB: 1940  MRN: [id number]    Date of Procedure: 4/2/2024    Pre-Op Diagnosis Codes:     * Open knee wound, left, initial encounter [S81.002A]    Post-Op Diagnosis: Same       Procedure(s):  LEFT KNEE INCISION AND DRAINAGE, REPAIR OF QUADRACEPTS TENDON, BACTISURE WOUND LAVAGE, AND WOUND CLOSURE WITH PREVENA APPLICATION    Surgeon(s):  Damian Keyes MD    Assistant:   Resident: Jeremy Rivera DO Joe Bielecki, CST7  Anesthesia: General    Estimated Blood Loss (mL): less than 50     Complications: None    Specimens:   * No specimens in log *    Implants:  * No implants in log *      Drains:   Negative Pressure Wound Therapy Leg Left;Anterior (Active)   Wound Type Surgical 03/12/24 1600   Unit Type KCI VAC Ulta 03/12/24 1600   Dressing Type White Foam;Black Foam 03/12/24 1600   Number of pieces used 3 03/11/24 1245   Number of pieces removed 1 03/11/24 1245   Cycle Continuous 03/11/24 1619   Target Pressure (mmHg) 125 03/11/24 1619   Canister changed? No 03/11/24 1245   Dressing Status Clean, dry & intact 03/12/24 1600   Dressing Changed Other (Comment) 03/12/24 1815   Drainage Amount Small 03/11/24 1619   Drainage Description Sanguinous 03/11/24 1619   Output (ml) 350 ml 03/12/24 1231       Urinary Catheter (Active)       [REMOVED] Urinary Catheter 03/06/24 (Removed)   Catheter Indications Urinary retention (acute or chronic), continuous bladder irrigation or bladder outlet obstruction 03/12/24 1600   Site Assessment No urethral drainage 03/12/24 1600   Urine Color Caitlyn 03/12/24 1742   Urine Appearance Hazy 03/12/24 1742   Urine Odor Malodorous 03/12/24 1742   Collection Container Standard 03/11/24 1619   Securement Method Securing device (Describe) 03/11/24 1619   Catheter Care  Soap and water 03/12/24 0556   Catheter Best Practices  Drainage tube clipped to bed;Catheter secured to thigh;Tamper seal intact;Bag below bladder;Bag not on floor;Lack of dependent  approximation of the wound with a combination of 3-0 Monocryl stitches as well as #1 Ethibond and then finally #1 Prolene were able to approximate the wound this was done with a large bites initially and then bringing the edges closer together.  Were able to get complete wound closure and adequate skin edge eversion with what appeared to be appropriate amount of tension and not overtensioning.  A Prevena plus was applied and hooked to wound suction and then wrapped with sterile dressings and Ace bandage patient was placed into a locked hinged brace in full extension was awakened taken to postanesthesia care unit in good condition    Electronically signed by Damian Keyes MD on 4/2/2024 at 4:27 PM

## 2024-04-02 NOTE — H&P
HISTORY and PHYSICAL  [facility]       NAME:  Komal Crawford  MRN: [id number]   YOB: 1940   Date: 4/2/2024   Age: 83 y.o.  Gender: female       COMPLAINT AND PRESENT HISTORY:     Komal Crawford is 83 y.o.,  female, presents for TOTAL KNEE WOUND CLOSURE WITH INCISION AND DRAINAGE   Primary dx: Open knee wound, left, initial encounter [S81.002A].    Office note per Dr Keyes on 4/1/2024  Komal returns today she is status post distal femoral revision for periprosthetic femur fracture of the left knee.  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  She had a wound VAC applied at that time.  She has been following through wound care and states that they have been trying to continue with the wound care.  I spoke with Dr. Atwood the other day that states that the patient deep tissues are broken down and prosthesis is visible.  She is being seen today and she is already been scheduled for irrigation debridement and wound closure tomorrow for 224     Examination notes the patient is wound is obviously open.  She does have disruption of her quadriceps tendon from her previous procedure and prosthesis is visible.  No gross purulence is seen however.     Patient to be scheduled for irrigation and debridement as well as quadriceps tendon repair and primary wound closure.  Patient likely to be admitted for care and resumption of her IV antibiotics before being returned back to the nursing home    Wound clinic note per Dr Arteaga on 3/29/2024  HISTORY of PRESENT ILLNESS HPI  Komal Crawford is a 83 y.o. female who presents today for wound/ulcer evaluation.   History of Wound Context: The patient is here for left knee open wound with prosthesis exposure.  No purulent drainage, no surrounding redness.  She denied significant pain, no fever or chills, no new complaints.  Thrush

## 2024-04-03 ENCOUNTER — CARE COORDINATION (OUTPATIENT)
Dept: CARE COORDINATION | Age: 84
End: 2024-04-03

## 2024-04-03 VITALS
SYSTOLIC BLOOD PRESSURE: 112 MMHG | RESPIRATION RATE: 18 BRPM | DIASTOLIC BLOOD PRESSURE: 61 MMHG | OXYGEN SATURATION: 98 % | TEMPERATURE: 96.9 F | HEART RATE: 82 BPM

## 2024-04-03 PROBLEM — R40.0 SOMNOLENCE: Status: ACTIVE | Noted: 2024-04-03

## 2024-04-03 LAB
ALBUMIN SERPL-MCNC: 2.3 G/DL (ref 3.5–5.2)
ALP SERPL-CCNC: 77 U/L (ref 35–104)
ALT SERPL-CCNC: 10 U/L (ref 5–33)
AMMONIA PLAS-SCNC: 17 UMOL/L (ref 11–51)
ANION GAP SERPL CALCULATED.3IONS-SCNC: 10 MMOL/L (ref 9–17)
ANION GAP SERPL CALCULATED.3IONS-SCNC: 11 MMOL/L (ref 9–17)
AST SERPL-CCNC: 14 U/L
BACTERIA URNS QL MICRO: ABNORMAL
BILIRUB DIRECT SERPL-MCNC: 0.1 MG/DL
BILIRUB INDIRECT SERPL-MCNC: 0.2 MG/DL (ref 0–1)
BILIRUB SERPL-MCNC: 0.3 MG/DL (ref 0.3–1.2)
BILIRUB UR QL STRIP: NEGATIVE
BODY TEMPERATURE: 37
BUN SERPL-MCNC: 12 MG/DL (ref 8–23)
BUN SERPL-MCNC: 13 MG/DL (ref 8–23)
CALCIUM SERPL-MCNC: 7.8 MG/DL (ref 8.6–10.4)
CALCIUM SERPL-MCNC: 7.8 MG/DL (ref 8.6–10.4)
CHLORIDE SERPL-SCNC: 105 MMOL/L (ref 98–107)
CHLORIDE SERPL-SCNC: 108 MMOL/L (ref 98–107)
CLARITY UR: ABNORMAL
CO2 SERPL-SCNC: 19 MMOL/L (ref 20–31)
CO2 SERPL-SCNC: 20 MMOL/L (ref 20–31)
COHGB MFR BLD: 2.4 % (ref 0–5)
COLOR UR: ABNORMAL
CREAT SERPL-MCNC: 0.6 MG/DL (ref 0.5–0.9)
CREAT SERPL-MCNC: 0.7 MG/DL (ref 0.5–0.9)
CREAT UR-MCNC: 68.2 MG/DL (ref 28–217)
EOSINOPHIL,URINE: NORMAL
EPI CELLS #/AREA URNS HPF: ABNORMAL /HPF
GFR SERPL CREATININE-BSD FRML MDRD: 86 ML/MIN/1.73M2
GFR SERPL CREATININE-BSD FRML MDRD: 90 ML/MIN/1.73M2
GLUCOSE BLD-MCNC: 101 MG/DL (ref 65–105)
GLUCOSE BLD-MCNC: 132 MG/DL (ref 65–105)
GLUCOSE BLD-MCNC: 140 MG/DL (ref 65–105)
GLUCOSE BLD-MCNC: 153 MG/DL (ref 65–105)
GLUCOSE SERPL-MCNC: 123 MG/DL (ref 70–99)
GLUCOSE SERPL-MCNC: 152 MG/DL (ref 70–99)
GLUCOSE UR STRIP-MCNC: ABNORMAL MG/DL
HCO3 VENOUS: 22.6 MMOL/L (ref 24–30)
HGB UR QL STRIP.AUTO: ABNORMAL
KETONES UR STRIP-MCNC: ABNORMAL MG/DL
LEUKOCYTE ESTERASE UR QL STRIP: ABNORMAL
MAGNESIUM SERPL-MCNC: 1.6 MG/DL (ref 1.6–2.6)
METHEMOGLOBIN: 1.1 % (ref 0–1.9)
NEGATIVE BASE EXCESS, VEN: 2.2 MMOL/L (ref 0–2)
NITRITE UR QL STRIP: NEGATIVE
O2 SAT, VEN: 87.7 % (ref 60–85)
PCO2, VEN: 36.2 MM HG (ref 39–55)
PH UR STRIP: 5.5 [PH] (ref 5–8)
PH VENOUS: 7.4 (ref 7.32–7.42)
PO2, VEN: 59.4 MM HG (ref 30–50)
POTASSIUM SERPL-SCNC: 3.5 MMOL/L (ref 3.7–5.3)
POTASSIUM SERPL-SCNC: 4.1 MMOL/L (ref 3.7–5.3)
PROT SERPL-MCNC: 5.5 G/DL (ref 6.4–8.3)
PROT UR STRIP-MCNC: ABNORMAL MG/DL
RBC #/AREA URNS HPF: ABNORMAL /HPF
SODIUM SERPL-SCNC: 135 MMOL/L (ref 135–144)
SODIUM SERPL-SCNC: 138 MMOL/L (ref 135–144)
SODIUM UR-SCNC: 132 MMOL/L
SP GR UR STRIP: 1.03 (ref 1–1.03)
UROBILINOGEN UR STRIP-ACNC: NORMAL EU/DL (ref 0–1)
UUN UR-MCNC: 426 MG/DL
WBC #/AREA URNS HPF: ABNORMAL /HPF
YEAST URNS QL MICRO: ABNORMAL

## 2024-04-03 PROCEDURE — 97535 SELF CARE MNGMENT TRAINING: CPT

## 2024-04-03 PROCEDURE — 6370000000 HC RX 637 (ALT 250 FOR IP): Performed by: ORTHOPAEDIC SURGERY

## 2024-04-03 PROCEDURE — 87205 SMEAR GRAM STAIN: CPT

## 2024-04-03 PROCEDURE — 83735 ASSAY OF MAGNESIUM: CPT

## 2024-04-03 PROCEDURE — 6360000002 HC RX W HCPCS: Performed by: INTERNAL MEDICINE

## 2024-04-03 PROCEDURE — 97162 PT EVAL MOD COMPLEX 30 MIN: CPT

## 2024-04-03 PROCEDURE — 97530 THERAPEUTIC ACTIVITIES: CPT

## 2024-04-03 PROCEDURE — 2580000003 HC RX 258: Performed by: INTERNAL MEDICINE

## 2024-04-03 PROCEDURE — 36415 COLL VENOUS BLD VENIPUNCTURE: CPT

## 2024-04-03 PROCEDURE — 82570 ASSAY OF URINE CREATININE: CPT

## 2024-04-03 PROCEDURE — 99024 POSTOP FOLLOW-UP VISIT: CPT | Performed by: ORTHOPAEDIC SURGERY

## 2024-04-03 PROCEDURE — 80076 HEPATIC FUNCTION PANEL: CPT

## 2024-04-03 PROCEDURE — 6370000000 HC RX 637 (ALT 250 FOR IP): Performed by: NURSE PRACTITIONER

## 2024-04-03 PROCEDURE — 82800 BLOOD PH: CPT

## 2024-04-03 PROCEDURE — 97166 OT EVAL MOD COMPLEX 45 MIN: CPT

## 2024-04-03 PROCEDURE — 81001 URINALYSIS AUTO W/SCOPE: CPT

## 2024-04-03 PROCEDURE — 82805 BLOOD GASES W/O2 SATURATION: CPT

## 2024-04-03 PROCEDURE — 80048 BASIC METABOLIC PNL TOTAL CA: CPT

## 2024-04-03 PROCEDURE — 1200000000 HC SEMI PRIVATE

## 2024-04-03 PROCEDURE — 99223 1ST HOSP IP/OBS HIGH 75: CPT | Performed by: INTERNAL MEDICINE

## 2024-04-03 PROCEDURE — 82947 ASSAY GLUCOSE BLOOD QUANT: CPT

## 2024-04-03 PROCEDURE — 84300 ASSAY OF URINE SODIUM: CPT

## 2024-04-03 PROCEDURE — 2580000003 HC RX 258: Performed by: NURSE PRACTITIONER

## 2024-04-03 PROCEDURE — 6360000002 HC RX W HCPCS: Performed by: ORTHOPAEDIC SURGERY

## 2024-04-03 PROCEDURE — 99213 OFFICE O/P EST LOW 20 MIN: CPT

## 2024-04-03 PROCEDURE — 2580000003 HC RX 258: Performed by: ORTHOPAEDIC SURGERY

## 2024-04-03 PROCEDURE — 99222 1ST HOSP IP/OBS MODERATE 55: CPT | Performed by: INTERNAL MEDICINE

## 2024-04-03 PROCEDURE — 84540 ASSAY OF URINE/UREA-N: CPT

## 2024-04-03 PROCEDURE — 82140 ASSAY OF AMMONIA: CPT

## 2024-04-03 RX ORDER — POTASSIUM CHLORIDE 7.45 MG/ML
10 INJECTION INTRAVENOUS PRN
Status: DISCONTINUED | OUTPATIENT
Start: 2024-04-03 | End: 2024-04-06 | Stop reason: HOSPADM

## 2024-04-03 RX ORDER — DIPHENHYDRAMINE HCL 25 MG
25 TABLET ORAL EVERY 6 HOURS PRN
Status: DISCONTINUED | OUTPATIENT
Start: 2024-04-03 | End: 2024-04-06 | Stop reason: HOSPADM

## 2024-04-03 RX ORDER — POTASSIUM CHLORIDE 7.45 MG/ML
20 INJECTION INTRAVENOUS PRN
Status: DISCONTINUED | OUTPATIENT
Start: 2024-04-03 | End: 2024-04-06 | Stop reason: HOSPADM

## 2024-04-03 RX ORDER — HYDROCODONE BITARTRATE AND ACETAMINOPHEN 5; 325 MG/1; MG/1
1 TABLET ORAL EVERY 6 HOURS PRN
Status: DISCONTINUED | OUTPATIENT
Start: 2024-04-03 | End: 2024-04-06 | Stop reason: HOSPADM

## 2024-04-03 RX ORDER — 0.9 % SODIUM CHLORIDE 0.9 %
500 INTRAVENOUS SOLUTION INTRAVENOUS ONCE
Status: COMPLETED | OUTPATIENT
Start: 2024-04-03 | End: 2024-04-03

## 2024-04-03 RX ORDER — MAGNESIUM SULFATE HEPTAHYDRATE 40 MG/ML
2000 INJECTION, SOLUTION INTRAVENOUS PRN
Status: DISCONTINUED | OUTPATIENT
Start: 2024-04-03 | End: 2024-04-06 | Stop reason: HOSPADM

## 2024-04-03 RX ADMIN — ACETAMINOPHEN 650 MG: 325 TABLET ORAL at 17:08

## 2024-04-03 RX ADMIN — VANCOMYCIN HYDROCHLORIDE 1000 MG: 1 INJECTION, POWDER, LYOPHILIZED, FOR SOLUTION INTRAVENOUS at 03:34

## 2024-04-03 RX ADMIN — OXYCODONE HYDROCHLORIDE 10 MG: 10 TABLET ORAL at 05:55

## 2024-04-03 RX ADMIN — ATORVASTATIN CALCIUM 10 MG: 10 TABLET, FILM COATED ORAL at 08:19

## 2024-04-03 RX ADMIN — ACETAMINOPHEN 650 MG: 325 TABLET ORAL at 08:19

## 2024-04-03 RX ADMIN — SODIUM CHLORIDE, POTASSIUM CHLORIDE, SODIUM LACTATE AND CALCIUM CHLORIDE: 600; 310; 30; 20 INJECTION, SOLUTION INTRAVENOUS at 09:15

## 2024-04-03 RX ADMIN — ACETAMINOPHEN 650 MG: 325 TABLET ORAL at 21:56

## 2024-04-03 RX ADMIN — HYDRALAZINE HYDROCHLORIDE 25 MG: 25 TABLET ORAL at 08:19

## 2024-04-03 RX ADMIN — PRIMIDONE 50 MG: 50 TABLET ORAL at 22:01

## 2024-04-03 RX ADMIN — TIZANIDINE 2 MG: 2 TABLET ORAL at 21:55

## 2024-04-03 RX ADMIN — ROPINIROLE HYDROCHLORIDE 4 MG: 2 TABLET, FILM COATED ORAL at 22:01

## 2024-04-03 RX ADMIN — RANOLAZINE 500 MG: 500 TABLET, EXTENDED RELEASE ORAL at 08:19

## 2024-04-03 RX ADMIN — SENNOSIDES 8.6 MG: 8.6 TABLET, FILM COATED ORAL at 08:20

## 2024-04-03 RX ADMIN — INSULIN GLARGINE 10 UNITS: 100 INJECTION, SOLUTION SUBCUTANEOUS at 21:55

## 2024-04-03 RX ADMIN — MEROPENEM 1000 MG: 1 INJECTION, POWDER, FOR SOLUTION INTRAVENOUS at 17:10

## 2024-04-03 RX ADMIN — SODIUM CHLORIDE, POTASSIUM CHLORIDE, SODIUM LACTATE AND CALCIUM CHLORIDE: 600; 310; 30; 20 INJECTION, SOLUTION INTRAVENOUS at 17:09

## 2024-04-03 RX ADMIN — ENOXAPARIN SODIUM 40 MG: 100 INJECTION SUBCUTANEOUS at 21:56

## 2024-04-03 RX ADMIN — SODIUM CHLORIDE 500 ML: 9 INJECTION, SOLUTION INTRAVENOUS at 10:40

## 2024-04-03 RX ADMIN — OXYCODONE HYDROCHLORIDE 10 MG: 10 TABLET ORAL at 00:46

## 2024-04-03 RX ADMIN — SODIUM CHLORIDE 500 ML: 9 INJECTION, SOLUTION INTRAVENOUS at 06:32

## 2024-04-03 RX ADMIN — RANOLAZINE 500 MG: 500 TABLET, EXTENDED RELEASE ORAL at 21:56

## 2024-04-03 RX ADMIN — PANTOPRAZOLE SODIUM 40 MG: 40 TABLET, DELAYED RELEASE ORAL at 05:42

## 2024-04-03 RX ADMIN — MEROPENEM 1000 MG: 1 INJECTION, POWDER, FOR SOLUTION INTRAVENOUS at 13:06

## 2024-04-03 RX ADMIN — NITROFURANTOIN MONOHYDRATE/MACROCRYSTALS 100 MG: 75; 25 CAPSULE ORAL at 08:19

## 2024-04-03 RX ADMIN — PRIMIDONE 50 MG: 50 TABLET ORAL at 09:35

## 2024-04-03 RX ADMIN — SENNOSIDES 8.6 MG: 8.6 TABLET, FILM COATED ORAL at 21:56

## 2024-04-03 ASSESSMENT — PAIN SCALES - GENERAL
PAINLEVEL_OUTOF10: 7
PAINLEVEL_OUTOF10: 8
PAINLEVEL_OUTOF10: 0
PAINLEVEL_OUTOF10: 0
PAINLEVEL_OUTOF10: 9
PAINLEVEL_OUTOF10: 6

## 2024-04-03 ASSESSMENT — PAIN DESCRIPTION - LOCATION
LOCATION: LEG
LOCATION: KNEE
LOCATION: LEG
LOCATION: KNEE

## 2024-04-03 ASSESSMENT — PAIN DESCRIPTION - ORIENTATION
ORIENTATION: LEFT

## 2024-04-03 ASSESSMENT — PAIN - FUNCTIONAL ASSESSMENT: PAIN_FUNCTIONAL_ASSESSMENT: ACTIVITIES ARE NOT PREVENTED

## 2024-04-03 ASSESSMENT — PAIN DESCRIPTION - DESCRIPTORS
DESCRIPTORS: ACHING
DESCRIPTORS: ACHING
DESCRIPTORS: STABBING

## 2024-04-03 NOTE — CARE COORDINATION
Case Management Assessment  Initial Evaluation    Date/Time of Evaluation: 4/3/2024 1:48 PM  Assessment Completed by: Belgica Rodney RN    If patient is discharged prior to next notation, then this note serves as note for discharge by case management.    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 Yes    Chart Reviewed: Yes      History Provided by: Patient  Patient Orientation: Alert and Oriented    Patient Cognition: Alert    Hospitalization in the last 30 days (Readmission):  Yes    If yes, Readmission Assessment in CM Navigator will be completed.    Readmission Assessment  Number of Days since last admission?: 8-30 days  Previous Disposition: SNF  Who is being Interviewed: Patient  What was the patient's/caregiver's perception as to why they think they needed to return back to the hospital?: Other (Comment) (scheduled surgery)  Did you visit your Primary Care Physician after you left the hospital, before you returned this time?: No  Why weren't you able to visit your PCP?: Other (Comment) (Pt was at [facility])  Did you see a specialist, such as Cardiac, Pulmonary, Orthopedic Physician, etc. after you left the hospital?: Yes  Who advised the patient to return to the hospital?: Physician  Does the patient report anything that got in the way of taking their medications?: No  In our efforts to provide the best possible care to you and others like you, can you think of anything that we could have done to help you after you left the hospital the first time, so that you might not have needed to return so soon?: Other (Comment) (n/a, pt

## 2024-04-03 NOTE — ACP (ADVANCE CARE PLANNING)
Advance Care Planning     Advance Care Planning Activator (Inpatient)  Conversation Note      Date of ACP Conversation: 4/3/2024     Conversation Conducted with: Patient with Decision Making Capacity    ACP Activator: Belgica Rodney RN    Health Care Decision Maker:     Current Designated Health Care Decision Maker:     Primary Decision Maker: Ritchie Crawford - Spouse - [phone]  Click here to complete Healthcare Decision Makers including section of the Healthcare Decision Maker Relationship (ie \"Primary\")  Today we documented Decision Maker(s) consistent with Legal Next of Kin hierarchy.    Care Preferences    Ventilation:  \"If you were in your present state of health and suddenly became very ill and were unable to breathe on your own, what would your preference be about the use of a ventilator (breathing machine) if it were available to you?\"      Would the patient desire the use of ventilator (breathing machine)?: yes    \"If your health worsens and it becomes clear that your chance of recovery is unlikely, what would your preference be about the use of a ventilator (breathing machine) if it were available to you?\"     Would the patient desire the use of ventilator (breathing machine)?: No      Resuscitation  \"CPR works best to restart the heart when there is a sudden event, like a heart attack, in someone who is otherwise healthy. Unfortunately, CPR does not typically restart the heart for people who have serious health conditions or who are very sick.\"    \"In the event your heart stopped as a result of an underlying serious health condition, would you want attempts to be made to restart your heart (answer \"yes\" for attempt to resuscitate) or would you prefer a natural death (answer \"no\" for do not attempt to resuscitate)?\" yes       [] Yes   [] No   Educated Patient / Decision Maker regarding differences between Advance Directives and portable DNR orders.    Length of ACP Conversation in minutes:

## 2024-04-03 NOTE — DISCHARGE INSTR - COC
Continuity of Care Form    Patient Name: Komal Crawford   :  1940  MRN:  [id number]    Admit date:  2024  Discharge date:  24    Code Status Order: Full Code   Advance Directives:   Advance Care Flowsheet Documentation       Date/Time Healthcare Directive Type of Healthcare Directive Copy in Chart Healthcare Agent Appointed Healthcare Agent's Name Healthcare Agent's Phone Number    [phone] Yes, patient has an advance directive for healthcare treatment Durable power of  for health care;Living will No, copy requested from family -- -- --            Admitting Physician:  Damian Keyes MD  PCP: Sabiha Bedolla MD    Discharging Nurse: Klarissa Hobbs RN   Discharging Hospital Unit/Room#: 2073/3-01  Discharging Unit Phone Number: [phone]    Emergency Contact:   Extended Emergency Contact Information  Primary Emergency Contact: Ritchie Crawford  Address: [address] [place] [address]            [address] [facility]  Home Phone: [phone]  Work Phone: [phone]  Mobile Phone: [phone]  Relation: Spouse  Secondary Emergency Contact: An Ventura  Home Phone: [phone]  Mobile Phone: [phone]  Relation: Other    Past Surgical History:  Past Surgical History:   Procedure Laterality Date    APPENDECTOMY      BLADDER SURGERY      bladder stimulator    BLADDER SUSPENSION      multiple    CARDIAC CATHETERIZATION      no stents    CARDIOVASCULAR STRESS TEST  2014    CATARACT REMOVAL WITH IMPLANT Left 2017    [facility]    CATARACT REMOVAL WITH IMPLANT Right 2017    [facility]    COLON SURGERY      colostomy reversal    COLONOSCOPY  2016    tubular adenoma x3; internal hemorrhoids    COLONOSCOPY N/A 2019    COLONOSCOPY DIAGNOSTIC performed by Eli Marinelli MD at [facility] END[facility]    COLONOSCOPY  2019    COLOSTOMY  1986    bowel obstruction/ rupture from diverticular disease, reversal 3 mos later     Date: 4/2/2024    Readmission Risk Assessment Score:  Readmission Risk              Risk of Unplanned Readmission:  37           Discharging to Facility/ Agency   [facility]  [address] [place] [address]  Phone [phone]  Fax [phone]  Evening fax [phone]       Dialysis Facility (if applicable)   Name:  Address:  Dialysis Schedule:  Phone:  Fax:    / signature: Electronically signed by Belgica Rodney RN on 4/3/24 at 1:47 PM EDT    PHYSICIAN SECTION    Prognosis: Good    Condition at Discharge: Stable    Rehab Potential (if transferring to Rehab): Good    Recommended Labs or Other Treatments After Discharge:     Physician Certification: I certify the above information and transfer of Komal Crawford  is necessary for the continuing treatment of the diagnosis listed and that she requires Skilled Nursing Facility for less 30 days.     Update Admission H&P: No change in H&P    PHYSICIAN SIGNATURE:  Electronically signed by Everett Shea MD on 4/4/24 at 9:42 AM EDT

## 2024-04-03 NOTE — PROGRESS NOTES
[facility]   OCCUPATIONAL THERAPY MISSED TREATMENT NOTE   INPATIENT   Date: 4/3/24  Patient Name: Komal Crawford       Room:   MRN: [id number]   Account #: [id number]    : 1940  (83 y.o.)  Gender: female     REASON FOR MISSED TREATMENT:  4-3-2024 at 844- nursing just setting pt up w/ breakfast tray and passing meds  4-3-2024 at 1005- pt with wound care nurse and having a hard time staying awake.  Will see for OT evaluation in the afternoon    Electronically signed by ELDA Arora on 4/3/24 at 11:22 AM EDT

## 2024-04-03 NOTE — PROGRESS NOTES
Writer notified Holley Garnett NP that pt has only had 200 mL of urine from reeves in the past 6 hours. Pt currently has LR running @ 125 mL/hr. Awaiting response.

## 2024-04-03 NOTE — CARE COORDINATION
Pt is admitted currently post procedure    Case Time: Procedures: Surgeons:   4/2/2024  2:30 PM LEFT KNEE INCISION AND DRAINAGE, BACTISURE WOUND LAVAGE, AND WOUND CLOSURE WITH PREVENA APPLICATION   REPAIR OF QUADRACEPTS TENDON,           Gayle Richardson RN Post Acute Care Manager [phone]

## 2024-04-03 NOTE — ANESTHESIA POSTPROCEDURE EVALUATION
Department of Anesthesiology  Postprocedure Note    Patient: Komal Crawford  MRN: [id number]  YOB: 1940  Date of evaluation: 4/3/2024    Procedure Summary       Date: 04/02/24 Room / Location: [place] [id number] / [facility]    Anesthesia Start: 1430 Anesthesia Stop: 1638    Procedures:       LEFT KNEE INCISION AND DRAINAGE, BACTISURE WOUND LAVAGE, AND WOUND CLOSURE WITH PREVENA APPLICATION (Left: Knee)      REPAIR OF QUADRACEPTS TENDON, (Left: Leg Upper) Diagnosis:       Open knee wound, left, initial encounter      (Open knee wound, left, initial encounter [S81.002A])    Surgeons: Damian Keyes MD Responsible Provider: Rivera Koch MD    Anesthesia Type: general ASA Status: 3            Anesthesia Type: No value filed.    Lelo Phase I: Lelo Score: 9    Lelo Phase II:      Anesthesia Post Evaluation    Comments: POD #1 Patient seen lying in bed. No anesthesia related issues reported by nursing staff or orthopedic team.  at bedside states she sleeps a lot after surgery and anesthesia but she does wake up and responds when spoken to.    No notable events documented.

## 2024-04-03 NOTE — CONSULTS
[facility]   IN-PATIENT SERVICE   [facility]    HISTORY AND PHYSICAL EXAMINATION            Date:   4/3/2024  Patient name:  Komal Crawford  Date of admission:  4/2/2024 12:07 PM  MRN:   [id number]  Account:  [id number]  YOB: 1940  PCP:    Sabiha Bedolla MD  Room:   2073/2073[facility]  Code Status:    Full Code    Chief Complaint:     Medical management    History Obtained From:     patient    History of Present Illness:     The patient is a 83 y.o.  Non- / non  female who presents with No chief complaint on file.   and she is admitted to the hospital for the management of left knee incision and drainage, repair of Quadriceps tendon, wound lavage and closure.  Internal medicine service consulted for medical management.    83-year-old female with history of neurogenic bladder, was performing straight in and out caths at home however more recently has a Winters inserted, chronic ITP, history of CVA, diabetes mellitus type 2, essential tremor, hyperlipidemia, hypertension was admitted to the hospital for left knee incision and drainage.    When seen today the patient is somnolent however awakens to verbal stimulus,  at bedside who reports that the patient does get this way after receiving anesthesia.  The patient denies nausea vomiting.  Nursing staff report concerns for decreased urine output since the patient arrived from the procedure yesterday.  She did not receive contrast, no NSAIDs were administered per my assessment on the chart.  Unable to tell if had episodes of hypotension in the OR.    Denies having fevers chills.        Past Medical History:     Past Medical History:   Diagnosis Date    Acquired absence of both cervix and uterus     Acquired absence of other organs     Age-related cognitive decline     Age-related osteoporosis without current pathological fracture     Ankle pain     Left ankle fracture in ortho boat.    Anxiety     Atherosclerotic heart      Iodides, Iodinated contrast media, Povidone-iodine, Sulfa antibiotics, Betadine [povidone iodine], Cephalexin, Cephalexin, Cozaar [losartan], Cymbalta [duloxetine hcl], Demerol, Doxycycline, Duloxetine, Meperidine, Morphine, Penicillins, Zithromax [azithromycin], Ciprofloxacin, Clindamycin/lincomycin, Etodolac, Fesoterodine, Fluorescein, Iodine, Lisinopril, Penicillin g, Toviaz [fesoterodine fumarate er], Clonazepam, and Metformin and related    Social History:     Tobacco:    reports that she quit smoking about 41 years ago. Her smoking use included cigarettes. She started smoking about 71 years ago. She has a 15.0 pack-year smoking history. She has been exposed to tobacco smoke. She quit smokeless tobacco use about 41 years ago.  Alcohol:      reports no history of alcohol use.  Drug Use:  reports no history of drug use.    Family History:     Family History   Problem Relation Age of Onset    Breast Cancer Mother     Cancer Mother         breast and uterine  41    Heart Disease Father     Heart Attack Father          32    Cancer Maternal Grandmother     Cancer Brother 52        bronchogenic adenocarcinoma cancer    Lung Cancer Brother     Cancer Sister         lung    Lung Cancer Sister          65    Breast Cancer Sister          57    Cancer Sister         breast       Review of Systems:     Positive and Negative as described in HPI.    CONSTITUTIONAL:  negative for fevers, chills, sweats, fatigue, weight loss  HEENT:  negative for vision, hearing changes, runny nose, throat pain  RESPIRATORY:  negative for shortness of breath, cough, congestion, wheezing.  CARDIOVASCULAR:  negative for chest pain, palpitations.  GASTROINTESTINAL:  negative for nausea, vomiting, diarrhea, constipation, change in bowel habits, abdominal pain   GENITOURINARY:  negative for difficulty of urination, burning with urination, frequency   INTEGUMENT:  negative for rash, skin lesions, easy bruising

## 2024-04-03 NOTE — CONSULTS
[facility] -   Infectious diseases evaluation  admission date 4/2/2024    reason for consultation:   Left leg cellulitis    Impression :   Current:  Nonhealing left knee wound  Left  knee infected prosthesis status post irrigation and debridement with application of vancomycin powder and wound VAC 3/8/2024 left knee wound culture and intraoperative culture grew MRSA, vancomycin SARAH of 1  Pyuria /chronic Winters catheter  Status post left total hip arthroplasty/displaced periprosthetic femur fracture status post left total knee arthroplasty revision 1/23/24  History of ITP  CVA  Diabetes mellitus  Hypertension  Hyperlipidemia  Multiple antibiotics allergy  History of multidrug-resistant organism   Cephalosporin, sulfa penicillin, Cipro, doxycycline and clindamycin allergy  Mild thrombocytopenia      HENCE:   Continue IV vancomycin through 4/24/2024  IV meropenem for now  Follow final cultures and adjust antibiotics as needed  Follow CBC and BUN/creatinine weekly while on IV antibiotics      Infection Control Recommendations   [place] Precautions  Contact Isolation       Antimicrobial Stewardship Recommendations   Simplification of therapy  Targeted therapy      History of Present Illness:   Initial history:  Komal Crawford is a 83 y.o.-year-old female presented to hospital for nonhealing left knee wound with prosthesis exposure.  Status post incision and drainage repair of quadriceps tendon, wound lavage and closure 4/2/24.  Reportedly had episodes of hypotension in the OR  She is afebrile, sleepy, arousable, denied significant pain, denied cough or shortness of breath, no other complaints.  The patient was hospitalized last month was left knee prosthetic infection status post irrigation and debridement with application of vancomycin powder and wound VAC 3/8/2024 left knee wound culture and intraoperative culture grew MRSA, vancomycin SARAH of 1.  She had a PICC line placed  esophagitis     GERD (gastroesophageal reflux disease)     Hiatal hernia     Hip fracture (HCC)     History of blood transfusion     History of decreased platelet count     Hyperlipemia     Hypertension     Immune thrombocytopenic purpura (HCC)     Internal hemorrhoid     Localized edema     Long term (current) use of anticoagulants     Long term (current) use of oral hypoglycemic drugs     Long term current use of aspirin     Long term current use of insulin (HCC)     Macular degeneration of left eye 1980's    S/P laser treatment    Major depressive disorder, recurrent, in remission (HCC)     Mixed incontinence     Nausea and vomiting     Neuromuscular dysfunction of bladder, unspecified     Neuropathy     Obesity, unspecified     Occlusion and stenosis of unspecified cerebral artery     Osteoarthritis     Other abnormalities of gait and mobility     Other cervical disc degeneration, unspecified cervical region     Other chronic pain     Other hemorrhoids     Other malaise     Other specified disorders of bladder     Pain in left ankle and joints of left foot     Personal history of colonic polyps     Personal history of healed osteoporosis fracture     Personal history of nicotine dependence     Personal history of transient ischemic attack (TIA), and cerebral infarction without residual deficits     Personal history of urinary (tract) infections     Polyosteoarthritis, unspecified     Presence of artificial hip joint, bilateral     Presence of artificial knee joint, bilateral     Presence of coronary angioplasty implant and graft     Presence of intraocular lens     Presence of urogenital implant     Pressure ulcer of left buttock, stage 2 (HCC)     Pressure ulcer of right buttock, stage 2 (HCC)     Pressure ulcer of right heel, stage 1     Pure hypercholesterolemia, unspecified     Rectal prolapse     Resistance to multiple antibiotics     Retention of urine, unspecified     Ringing in ears     Self-catheterizes

## 2024-04-03 NOTE — PROGRESS NOTES
[facility]   Occupational Therapy Evaluation  Date: 4/3/24  Patient Name: Komal Crawford       Room:   MRN: [id number]  Account: [id number]   : 1940  (83 y.o.) Gender: female     Discharge Recommendations:  Further Occupational Therapy is recommended upon facility discharge.    OT Equipment Recommendations  Other: TBD    Referring Practitioner: Dr. Keyes  Diagnosis: 24 LEFT KNEE INCISION AND DRAINAGE, REPAIR OF QUADRACEPTS TENDON, BACTISURE WOUND LAVAGE, AND WOUND CLOSURE WITH PREVENA APPLICATION     Treatment Diagnosis: Impaired self-care status    Past Medical History:  has a past medical history of Acquired absence of both cervix and uterus, Acquired absence of other organs, Age-related cognitive decline, Age-related osteoporosis without current pathological fracture, Ankle pain, Anxiety, Atherosclerotic heart disease of native coronary artery without angina pectoris, B12 deficiency, Winters esophagus, Winters's esophagus without dysplasia, 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[facility]), Body mass index 31.0-31.9, adult, Caffeine use, Cataract extraction status of eye, left, Cataract extraction status of right eye, Cellulitis of left lower limb, Cerebral artery occlusion with cerebral infarction ([facility]), Chronic back pain, Chronic ITP (idiopathic thrombocytopenia) ([facility]), Constipation, unspecified, CVA (cerebral infarction), Cyst of kidney, acquired, Deficiency of other specified B group vitamins, Diabetic gastroparesis ([facility]), Diaphragmatic hernia without obstruction or gangrene, Diverticular disease, Diverticulosis of intestine without perforation or abscess without bleeding, Dorsalgia, unspecified, Essential tremor, Exudative age-related macular degeneration, left eye, with active choroidal neovascularization ([facility]), Gastroesophageal reflux disease without esophagitis, GERD (gastroesophageal reflux disease), Hiatal hernia, Hip fracture ([facility]), History of blood transfusion, History of  therapy after discharge    Activity Tolerance  Activity Tolerance: Patient limited by fatigue, Patient limited by pain    Safety Devices  Type of Devices: All fall risk precautions in place, All venice prominences offloaded, Call light within reach, Gait belt, Bed alarm in place, Patient at risk for falls, Left in bed, Nurse notified (nurse Gibbons)    Patient Education  Patient Education  Education Given To: Patient, Family  Education Provided: Role of Therapy, Plan of Care, Precautions, Transfer Training  Education Method: Verbal, Demonstration  Barriers to Learning: None  Education Outcome: Verbalized understanding, Continued education needed    Functional Outcome Measures  -PAC Daily Activity - Inpatient   How much help is needed for putting on and taking off regular lower body clothing?: Total  How much help is needed for bathing (which includes washing, rinsing, drying)?: Total  How much help is needed for toileting (which includes using toilet, bedpan, or urinal)?: Total  How much help is needed for putting on and taking off regular upper body clothing?: A Little  How much help is needed for taking care of personal grooming?: A Little  How much help for eating meals?: A Little  AM-[facility] Inpatient Daily Activity Raw Score: 12  AM-PAC Inpatient ADL T-Scale Score : 30.6  ADL Inpatient CMS 0-100% Score: 66.57  ADL Inpatient CMS G-Code Modifier : CL    Goals  Patient Goals   Patient goals : Pt motivated to regain function and independence.  Short Term Goals  Time Frame for Short Term Goals: By Discharge  Short Term Goal 1: Pt will complete bed mobility with Max A x1 and tolerate sitting EOB for 5-10 minutes with R foot on floor while participating in a functional task.  Short Term Goal 2: Pt will actively participate in self-care routine at EOB or up to chair to promote increased overall strength and activity tolerance.  Short Term Goal 3: Pt will V/D Good understanding of AE and modified techniques for LB ADL's with

## 2024-04-03 NOTE — PROGRESS NOTES
Post Operative Progress Note    4/3/2024 8:27 AM  Info:   Admit Date: 4/2/2024  PCP: Sabiha Bedolla MD      Medications:   Scheduled Meds:   sodium chloride  500 mL IntraVENous Once    sodium chloride flush  5-40 mL IntraVENous 2 times per day    acetaminophen  650 mg Oral Q6H    enoxaparin  40 mg SubCUTAneous Daily    [Held by provider] aspirin  81 mg Oral Daily    atorvastatin  10 mg Oral Daily    [Held by provider] clopidogrel  75 mg Oral Daily    hydrALAZINE  25 mg Oral Daily    senna  1 tablet Oral BID    rOPINIRole  4 mg Oral Nightly    ranolazine  500 mg Oral BID    primidone  50 mg Oral BID    pantoprazole  40 mg Oral QAM AC    nystatin  500,000 Units Oral 4x Daily    nitrofurantoin (macrocrystal-monohydrate)  100 mg Oral 2 times per day    insulin glargine  10 Units SubCUTAneous Nightly    insulin lispro  0-8 Units SubCUTAneous TID WC    insulin lispro  0-4 Units SubCUTAneous Nightly     Continuous Infusions:   lactated ringers IV soln 125 mL/hr at 04/02/24 1838    sodium chloride      dextrose       PRN Meds:sodium chloride flush, sodium chloride, oxyCODONE **OR** oxyCODONE, HYDROmorphone **OR** HYDROmorphone, tiZANidine, midodrine, glucose, dextrose bolus **OR** dextrose bolus, glucagon (rDNA), dextrose    Diet:   Diet: ADULT DIET; Regular    Subjective:   Systemic or Specific Complaints:No Complaints    Objective:     Patient Vitals for the past 24 hrs:   BP Temp Temp src Pulse Resp SpO2 Height Weight   04/03/24 0645 125/62 97 °F (36.1 °C) Axillary 80 16 100 % -- --   04/03/24 0406 (!) 133/58 97.9 °F (36.6 °C) -- 84 18 94 % -- --   04/03/24 0116 -- -- -- -- 16 -- -- --   04/03/24 0030 (!) 113/52 -- -- -- -- -- -- --   04/02/24 2312 (!) 109/41 98.4 °F (36.9 °C) -- 72 18 96 % -- --   04/02/24 2221 (!) 93/51 -- -- -- -- -- -- --   04/02/24 2007 (!) 150/62 97.8 °F (36.6 °C) Oral 65 16 100 % -- --   04/02/24 1757 (!) 147/56 97.9 °F (36.6 °C) Oral 75 15 97 % -- --   04/02/24 1720 (!) 139/58 98.2 °F (36.8  °C) -- 77 10 96 % -- --   04/02/24 1710 (!) 140/58 -- -- 74 (!) 8 99 % -- --   04/02/24 1701 (!) 138/57 -- -- 79 16 97 % -- --   04/02/24 1700 (!) 138/57 -- -- 77 15 99 % -- --   04/02/24 1650 (!) 145/54 -- -- 79 18 99 % -- --   04/02/24 1640 (!) 133/53 -- -- 77 (!) 0 99 % -- --   04/02/24 1633 135/64 97.3 °F (36.3 °C) Infrared 78 12 100 % -- --   04/02/24 1327 119/63 98.7 °F (37.1 °C) Tympanic 87 18 96 % 1.473 m (4' 9.99\") 75.3 kg (166 lb)         Wound: Prevena hooked to wound VAC intact.  Incision not inspected   Motion: Patient and his knee brace locked in full extension in affected extremity   DVT Exam:  no evidence of DVT on physical examination         Data Review  CBC:   Recent Labs     04/01/24  0836   WBC 8.0   HGB 8.4*              Assessment:   Patient is S/P left Knee, irrigation and debridement/quadriceps repair of left knee, Arthoplasty  Pain is well controlled. She has no nausea and no vomiting.    Current activity is up with assistance with locked hinged knee brace on at all times        Plan:      1:  Continue Physical Therapy  2:  Continue Deep venous thrombosis prophylaxis  3:  Continue Pain Control  4:  Discharge plans SNF   5.  Infectious disease consultation for continuation of IV antibiotic therapy plan is for patient be discharged IV antibiotics.  Anticipate patient to be on chronic suppressive antibiotics for the remainder of her life    Electronically signed by Damian Keyes MD on 4/3/2024 at 8:27 AM

## 2024-04-03 NOTE — ANESTHESIA POSTPROCEDURE EVALUATION
Department of Anesthesiology  Postprocedure Note    Patient: Komal Crawford  MRN: [id number]  YOB: 1940  Date of evaluation: 4/2/2024    Procedure Summary       Date: 04/02/24 Room / Location: [place] [address] / [facility]    Anesthesia Start: 1430 Anesthesia Stop: 1638    Procedure: LEFT KNEE INCISION AND DRAINAGE, REPAIR OF QUADRACEPTS TENDON, BACTISURE WOUND LAVAGE, AND WOUND CLOSURE WITH PREVENA APPLICATION (Left: Knee) Diagnosis:       Open knee wound, left, initial encounter      (Open knee wound, left, initial encounter [S81.002A])    Surgeons: Damian Keyes MD Responsible Provider: Rivera Koch MD    Anesthesia Type: general ASA Status: 3            Anesthesia Type: No value filed.    Lelo Phase I: Lelo Score: 9    Lelo Phase II:      Anesthesia Post Evaluation    Patient location during evaluation: PACU  Patient participation: complete - patient participated  Level of consciousness: awake and alert  Airway patency: patent  Nausea & Vomiting: no vomiting  Cardiovascular status: hemodynamically stable  Respiratory status: acceptable  Hydration status: euvolemic  Comments: POST- ANESTHESIA EVALUATION       Pt Name: Komal Crawford  MRN: [id number]  YOB: 1940  Date of evaluation: 4/2/2024  Time:  8:52 PM      BP (!) 150/62   Pulse 65   Temp 97.8 °F (36.6 °C) (Oral)   Resp 16   Ht 1.473 m (4' 9.99\")   Wt 75.3 kg (166 lb)   SpO2 100%   BMI 34.70 kg/m²      Consciousness Level  Awake  Cardiopulmonary Status  Stable  Pain Adequately Treated YES  Nausea / Vomiting  NO  Adequate Hydration  YES  Anesthesia Related Complications NONE      Electronically signed by Bud Pimentel MD on 4/2/2024 at 8:52 PM         Pain management: satisfactory to patient    No notable events documented.

## 2024-04-03 NOTE — PROGRESS NOTES
Physical Therapy  Facility/Department: [facility] MED SURG  Physical Therapy Initial Assessment    Name: Komal Crawford  : 1940  MRN: [id number]  Date of Service: 4/3/2024    Discharge Recommendations:  Patient would benefit from continued therapy after discharge, Therapy recommended at discharge   PT Equipment Recommendations  Equipment Needed:  (TBD)      Patient Diagnosis(es): There were no encounter diagnoses.  Past Medical History:  has a past medical history of Acquired absence of both cervix and uterus, Acquired absence of other organs, Age-related cognitive decline, Age-related osteoporosis without current pathological fracture, Ankle pain, Anxiety, Atherosclerotic heart disease of native coronary artery without angina pectoris, B12 deficiency, Winters esophagus, Winters's esophagus without dysplasia, 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HCC), Body mass index 31.0-31.9, adult, Caffeine use, Cataract extraction status of eye, left, Cataract extraction status of right eye, Cellulitis of left lower limb, Cerebral artery occlusion with cerebral infarction (HCC), Chronic back pain, Chronic ITP (idiopathic thrombocytopenia) (HCC), Constipation, unspecified, CVA (cerebral infarction), Cyst of kidney, acquired, Deficiency of other specified B group vitamins, Diabetic gastroparesis (HCC), Diaphragmatic hernia without obstruction or gangrene, Diverticular disease, Diverticulosis of intestine without perforation or abscess without bleeding, Dorsalgia, unspecified, Essential tremor, Exudative age-related macular degeneration, left eye, with active choroidal neovascularization (HCC), Gastroesophageal reflux disease without esophagitis, GERD (gastroesophageal reflux disease), Hiatal hernia, Hip fracture (HCC), History of blood transfusion, History of decreased platelet count, Hyperlipemia, Hypertension, Immune thrombocytopenic purpura (HCC), Internal hemorrhoid, Localized edema, Long term (current) use of anticoagulants, Long term  Comments: pt dangled at the EOB w/ CGA x 1 due to C/O dizziness.  therapists assisted placing shoes, socks and right lift onto shoe while pt dangled  Transfers  Sit to Stand: 2 Person Assistance;Dependent/Total  Stand to Sit: Dependent/Total;2 Person Assistance  Comment: attempted to stand next to the bed using wheeled walker left LE FWBAT w/ don chasidy brace locked in extension but dep x 2- pt unable to tolerate due to pain 7-8/10 and weakness- unable to lift buttucks off the bed  Ambulation  WB Status: don chasidy left knee brace locked in extension FWBAT; leg length discrepancy- wears black lift for right shoe due to leg length discrepancy (right shorter than the left)     Balance  Sitting - Static: Fair;+  Sitting - Dynamic: Fair  Standing - Static: Poor (used wheeled walker)  Standing - Dynamic: Poor (used wheeled walker)                                                         AM-PAC - Mobility    AM-PAC Basic Mobility - Inpatient   How much help is needed turning from your back to your side while in a flat bed without using bedrails?: Total  How much help is needed moving from lying on your back to sitting on the side of a flat bed without using bedrails?: Total  How much help is needed moving to and from a bed to a chair?: Total  How much help is needed standing up from a chair using your arms?: Total  How much help is needed walking in hospital room?: Total  How much help is needed climbing 3-5 steps with a railing?: Total  AM-PAC Inpatient Mobility Raw Score : 6  AM-PAC Inpatient T-Scale Score : 23.55  Mobility Inpatient CMS 0-100% Score: 100  Mobility Inpatient CMS G-Code Modifier : CN              Goals  Short Term Goals  Time Frame for Short Term Goals: daily x 1 week  Short Term Goal 1: pt to tolerate 1/2 hour of therapy per session  Short Term Goal 2: pt to tolerate gentle AAROM left hip and ankle and strengthening exercises right LEs, energy conservation and balance activities.  Short Term Goal 3: pt to

## 2024-04-03 NOTE — CARE COORDINATION
DISCHARGE PLANNING NOTE:    Spoke with Emily from [facility] who states pt can return when medically ready.  Referral sent.    Electronically signed by Belgica Rodney RN on 4/3/2024 at 10:45 AM

## 2024-04-03 NOTE — PROGRESS NOTES
Writer contacted Dr. Keyes to clarfy if pts aspirin or plavix should be resumed as it is currently held per medicine until stated otherwise by Dr. Keyes. Per Dr. Keyes plavix and aspirin are to be held until Thursday, 4/4.

## 2024-04-03 NOTE — CONSULTS
Ana Carrington MD   Multiple Vitamins-Minerals (THERAPEUTIC MULTIVITAMIN-MINERALS) tablet Take 1 tablet by mouth daily    Ana Carrington MD   Sodium Chloride Flush (NORMAL SALINE FLUSH IV) Infuse 20 mLs intravenously 2 times daily Use 20ml IV every shift for patency, flush after IV meds.    Ana Carrington MD   nystatin (MYCOSTATIN) 475022 UNIT/ML suspension Take 5 mLs by mouth 4 times daily    Ana Carrington MD   omeprazole (PRILOSEC) 20 MG delayed release capsule Take 1 capsule by mouth daily    Ana Carrington MD   senna (SENOKOT) 8.6 MG tablet Take 1 tablet by mouth 2 times daily For constipation    Ana Carrington MD   traMADol (ULTRAM) 50 MG tablet Take 1 tablet by mouth every 6 hours as needed for Pain.    Ana Carrington MD   aspirin 81 MG chewable tablet Take 1 tablet by mouth daily 3/12/24   Mya Melendez MD   vancomycin (VANCOCIN) infusion Infuse 1,250 mg intravenously every 24 hours Compound per protocol. 3/12/24 4/24/24  David Arteaga MD   insulin lispro (HUMALOG) 100 UNIT/ML SOLN injection vial Inject 0-8 Units into the skin 3 times daily (with meals)  Patient not taking: Reported on 3/22/2024 2/11/24   Sakshi Johnson MD   insulin lispro (HUMALOG) 100 UNIT/ML SOLN injection vial Inject 0-4 Units into the skin nightly  Patient not taking: Reported on 3/22/2024 2/11/24   Sakshi Johnson MD   diclofenac sodium (VOLTAREN) 1 % GEL Apply 4 g topically 2 times daily 2/11/24   Sakshi Johnson MD   nitrofurantoin, macrocrystal-monohydrate, (MACROBID) 100 MG capsule Take 1 capsule by mouth every 12 hours Chronic suppression/prevention 2/11/24   Sakshi Johnson MD   midodrine (PROAMATINE) 2.5 MG tablet Take 1 tablet by mouth 3 times daily as needed (sbp <100) 2/11/24   Sakshi Johnson MD   dicyclomine (BENTYL) 10 MG capsule Take 1 capsule by mouth 3 times daily as needed (For bladder spasm) 2/11/24   Sakshi Johnson MD   nystatin (MYCOSTATIN) 500457  Unsteadiness R26.81    Anxiety F41.9    Arthritis M19.90    Nausea R11.0    Diarrhea R19.7    Extrarenal pelvis [id number]    Primary osteoarthritis of left knee M17.12    Type 2 diabetes mellitus with diabetic peripheral angiopathy without gangrene, without long-term current use of insulin ([facility]) E11.51    Memory loss R41.3    Urethral pain R39.89    Bladder spasms N32.89    Atrophic vaginitis N95.2    Coronary artery disease involving native coronary artery of native heart without angina pectoris I25.10    Chronic fatigue R53.82    Essential tremor G25.0    Neuropathy G62.9    Coronary angioplasty status Z98.61    Recurrent major depressive disorder, in remission ([facility]) F33.40    Inability to walk R26.2    Presence of drug-eluting stent in right coronary artery Z95.5    Arthritis of right hip M16.11    History of total hip arthroplasty, right Z96.641    Pressure injury of right buttock, stage 2 ([facility]) L89.312    Tinea corporis B35.4    Debility R53.81    Left hip pain M25.552    Age related osteoporosis M81.0    Hip pain, acute, left M25.552    Arthritis of left hip M16.12    Closed displaced fracture of left femoral neck ([facility]) S72.002A    History of total knee arthroplasty, left Z96.652    Instability of left knee joint M25.362    Closed displaced fracture of right femoral neck ([facility]) S72.001A    Displaced supracondylar fracture without intracondylar extension of lower end of left femur, initial encounter for closed fracture ([facility]) S72.452A    Acute blood loss anemia D62    Periprosthetic fracture around internal prosthetic left knee joint M97.12XA    Closed fracture of left hip with routine healing S72.002D    Rectal prolapse K62.3    Sepsis ([facility]) A41.9    Severe sepsis ([facility]) A41.9, R65.20    Cellulitis of left lower extremity L03.116    Infection of total knee replacement ([facility]) T84.59XA, Z96.659    Acute cystitis without hematuria N30.00    Open wound of left knee S81.002A    Allergy to multiple antibiotics Z88.1     Infection of total left knee replacement, sequela T84.54XS    Somnolence R40.0         Measurements:  Wound 04/03/24 Buttocks incontinence assoicated dermatitis (Active)   Wound Image   04/03/24 1006   Wound Etiology Other 04/03/24 1006   Dressing Status New dressing applied 04/03/24 1006   Wound Cleansed Cleansed with saline 04/03/24 1006   Dressing/Treatment Triad hydro/zinc oxide-based hydrophilic paste 04/03/24 1006   Wound Assessment Denuded;Pink/red 04/03/24 1006   Drainage Amount None (dry) 04/03/24 1006   Odor None 04/03/24 1006   [place]-wound Assessment Blanchable erythema;Denuded;Erosion 04/03/24 1006   Margins Attached edges 04/03/24 1006   Number of days: 0         WOUND ASSESSMENT:   [facility] nurse consult for wounds to left knee and buttocks. Pt was admitted on 4/2 after a scheduled left knee I&D, repair of quadriceps tendon, wound closure, and prevena placement by Dr Keyes. Pt very sleepy at time of assessment. She denies any pain. Nursing student assisted writer to turn pt on her side. Buttocks red and blanchable with some mild denudation present. Recommend triad cream. Left knee dressing intact and NPWT in place with prevena setting at -125mmhg continuous suction. Immobilizer in place. Foot warm and edematous, DP and PT pulses palpable. Secure message sent to Dr Keyes regarding changing of the dressing and he would like it left in place for now. Will continue to follow.     Response to treatment:  Well tolerated by patient.       Plan:     Plan of Care:     Buttocks: Cleanse with soap and water, pat dry. Apply triad cream twice daily and as needed when incontinent.     Left knee NPWT: keep intact for now, Dr Keyes following      [x] Turn and reposition every 2 hours while in bed.    [x] Float heels off of bed with pillows under calves.    [] Heel protective boots (heel medix boots) at all times while in bed.    [] Sacral foam dressing to sacrococcygeal area. Use skin barrier film prior to placement. [place]

## 2024-04-03 NOTE — PLAN OF CARE
Problem: Discharge Planning  Goal: Discharge to home or other facility with appropriate resources  4/3/2024 0509 by Desmond Jaimes RN  Outcome: Progressing  Flowsheets (Taken 4/2/2024 2225 by Sharda Reyes, RN)  Discharge to home or other facility with appropriate resources: Identify barriers to discharge with patient and caregiver     Problem: Chronic Conditions and Co-morbidities  Goal: Patient's chronic conditions and co-morbidity symptoms are monitored and maintained or improved  4/3/2024 0509 by Desmond Jaimes RN  Outcome: Progressing  Flowsheets (Taken 4/2/2024 2225 by Sharda Reyes, RN)  Care Plan - Patient's Chronic Conditions and Co-Morbidity Symptoms are Monitored and Maintained or Improved: Monitor and assess patient's chronic conditions and comorbid symptoms for stability, deterioration, or improvement     Problem: Pain  Goal: Verbalizes/displays adequate comfort level or baseline comfort level  4/3/2024 0509 by Desmond Jaimes RN  Outcome: Progressing     Problem: Skin/Tissue Integrity  Goal: Absence of new skin breakdown  Description: 1.  Monitor for areas of redness and/or skin breakdown  2.  Assess vascular access sites hourly  3.  Every 4-6 hours minimum:  Change oxygen saturation probe site  4.  Every 4-6 hours:  If on nasal continuous positive airway pressure, respiratory therapy assess nares and determine need for appliance change or resting period.  4/3/2024 0509 by Desmond Jaimes RN  Outcome: Progressing     Problem: Safety - Adult  Goal: Free from fall injury  4/3/2024 0509 by Desmond Jaimes RN  Outcome: Progressing  Flowsheets (Taken 4/3/2024 0106)  Free From Fall Injury: Instruct family/caregiver on patient safety     Problem: ABCDS Injury Assessment  Goal: Absence of physical injury  4/3/2024 0509 by Desmond Jaimes RN  Outcome: Progressing  Flowsheets (Taken 4/3/2024 0106)  Absence of Physical Injury: Implement safety measures based on patient assessment

## 2024-04-03 NOTE — PROGRESS NOTES
Physical Therapy        Physical Therapy Cancel Note      DATE: 4/3/2024    NAME: Komal Crawford  MRN: [id number]   : 1940      Patient not seen this date for Physical Therapy due to:    4-3-2024 at 844- nursing just setting pt up w/ breakfast tray and passing meds  4-3-2024 at 1005- pt with wound care nurse.  Will see for PT evaluation in the afternoon.      Electronically signed by Taisha Hudson PT on 4/3/2024 at 10:15 AM

## 2024-04-03 NOTE — PLAN OF CARE
Problem: Discharge Planning  Goal: Discharge to home or other facility with appropriate resources  4/3/2024 1446 by Katheryn Soriano RN  Outcome: Progressing     Problem: Chronic Conditions and Co-morbidities  Goal: Patient's chronic conditions and co-morbidity symptoms are monitored and maintained or improved  4/3/2024 1446 by Katheryn Soriano RN  Outcome: Progressing     Problem: Pain  Goal: Verbalizes/displays adequate comfort level or baseline comfort level  4/3/2024 1446 by Katheryn Soriano RN  Outcome: Progressing     Problem: Skin/Tissue Integrity  Goal: Absence of new skin breakdown  4/3/2024 1446 by Katheryn Soriano RN  Outcome: Progressing     Problem: Safety - Adult  Goal: Free from fall injury  4/3/2024 1446 by Katheryn Soriano RN  Outcome: Progressing     Problem: ABCDS Injury Assessment  Goal: Absence of physical injury  4/3/2024 1446 by Katheryn Soriano RN  Outcome: Progressing

## 2024-04-04 ENCOUNTER — APPOINTMENT (OUTPATIENT)
Dept: GENERAL RADIOLOGY | Age: 84
DRG: 464 | End: 2024-04-04
Attending: ORTHOPAEDIC SURGERY
Payer: MEDICARE

## 2024-04-04 ENCOUNTER — APPOINTMENT (OUTPATIENT)
Dept: VASCULAR LAB | Age: 84
DRG: 464 | End: 2024-04-04
Attending: INTERNAL MEDICINE
Payer: MEDICARE

## 2024-04-04 LAB
ANION GAP SERPL CALCULATED.3IONS-SCNC: 10 MMOL/L (ref 9–17)
BASOPHILS # BLD: 0.1 K/UL (ref 0–0.2)
BASOPHILS NFR BLD: 1 % (ref 0–2)
BUN SERPL-MCNC: 11 MG/DL (ref 8–23)
CALCIUM SERPL-MCNC: 7.7 MG/DL (ref 8.6–10.4)
CHLORIDE SERPL-SCNC: 106 MMOL/L (ref 98–107)
CO2 SERPL-SCNC: 21 MMOL/L (ref 20–31)
CREAT SERPL-MCNC: 0.5 MG/DL (ref 0.5–0.9)
ECHO BSA: 1.76 M2
EOSINOPHIL # BLD: 0.6 K/UL (ref 0–0.4)
EOSINOPHILS RELATIVE PERCENT: 7 % (ref 0–4)
ERYTHROCYTE [DISTWIDTH] IN BLOOD BY AUTOMATED COUNT: 16.5 % (ref 11.5–14.9)
GFR SERPL CREATININE-BSD FRML MDRD: >90 ML/MIN/1.73M2
GLUCOSE BLD-MCNC: 154 MG/DL (ref 65–105)
GLUCOSE BLD-MCNC: 162 MG/DL (ref 65–105)
GLUCOSE BLD-MCNC: 170 MG/DL (ref 65–105)
GLUCOSE BLD-MCNC: 93 MG/DL (ref 65–105)
GLUCOSE SERPL-MCNC: 99 MG/DL (ref 70–99)
HCT VFR BLD AUTO: 23.6 % (ref 36–46)
HGB BLD-MCNC: 7.5 G/DL (ref 12–16)
LYMPHOCYTES NFR BLD: 1.4 K/UL (ref 1–4.8)
LYMPHOCYTES RELATIVE PERCENT: 15 % (ref 24–44)
MAGNESIUM SERPL-MCNC: 1.6 MG/DL (ref 1.6–2.6)
MCH RBC QN AUTO: 28.7 PG (ref 26–34)
MCHC RBC AUTO-ENTMCNC: 31.7 G/DL (ref 31–37)
MCV RBC AUTO: 90.3 FL (ref 80–100)
MONOCYTES NFR BLD: 0.9 K/UL (ref 0.1–1.3)
MONOCYTES NFR BLD: 10 % (ref 1–7)
NEUTROPHILS NFR BLD: 67 % (ref 36–66)
NEUTS SEG NFR BLD: 6.2 K/UL (ref 1.3–9.1)
PLATELET # BLD AUTO: 181 K/UL (ref 150–450)
PMV BLD AUTO: 11.9 FL (ref 6–12)
POTASSIUM SERPL-SCNC: 3.6 MMOL/L (ref 3.7–5.3)
RBC # BLD AUTO: 2.62 M/UL (ref 4–5.2)
SODIUM SERPL-SCNC: 137 MMOL/L (ref 135–144)
WBC OTHER # BLD: 9.2 K/UL (ref 3.5–11)

## 2024-04-04 PROCEDURE — 1200000000 HC SEMI PRIVATE

## 2024-04-04 PROCEDURE — 2580000003 HC RX 258: Performed by: ORTHOPAEDIC SURGERY

## 2024-04-04 PROCEDURE — 99024 POSTOP FOLLOW-UP VISIT: CPT | Performed by: ORTHOPAEDIC SURGERY

## 2024-04-04 PROCEDURE — 99233 SBSQ HOSP IP/OBS HIGH 50: CPT | Performed by: INTERNAL MEDICINE

## 2024-04-04 PROCEDURE — 99232 SBSQ HOSP IP/OBS MODERATE 35: CPT | Performed by: INTERNAL MEDICINE

## 2024-04-04 PROCEDURE — 6370000000 HC RX 637 (ALT 250 FOR IP): Performed by: NURSE PRACTITIONER

## 2024-04-04 PROCEDURE — 97535 SELF CARE MNGMENT TRAINING: CPT

## 2024-04-04 PROCEDURE — 97530 THERAPEUTIC ACTIVITIES: CPT

## 2024-04-04 PROCEDURE — 71045 X-RAY EXAM CHEST 1 VIEW: CPT

## 2024-04-04 PROCEDURE — 6370000000 HC RX 637 (ALT 250 FOR IP): Performed by: INTERNAL MEDICINE

## 2024-04-04 PROCEDURE — 85025 COMPLETE CBC W/AUTO DIFF WBC: CPT

## 2024-04-04 PROCEDURE — 97110 THERAPEUTIC EXERCISES: CPT

## 2024-04-04 PROCEDURE — 82947 ASSAY GLUCOSE BLOOD QUANT: CPT

## 2024-04-04 PROCEDURE — 6370000000 HC RX 637 (ALT 250 FOR IP): Performed by: ORTHOPAEDIC SURGERY

## 2024-04-04 PROCEDURE — 36415 COLL VENOUS BLD VENIPUNCTURE: CPT

## 2024-04-04 PROCEDURE — 80048 BASIC METABOLIC PNL TOTAL CA: CPT

## 2024-04-04 PROCEDURE — 93971 EXTREMITY STUDY: CPT | Performed by: SURGERY

## 2024-04-04 PROCEDURE — 93971 EXTREMITY STUDY: CPT

## 2024-04-04 PROCEDURE — 83735 ASSAY OF MAGNESIUM: CPT

## 2024-04-04 PROCEDURE — 2580000003 HC RX 258: Performed by: INTERNAL MEDICINE

## 2024-04-04 PROCEDURE — 6360000002 HC RX W HCPCS: Performed by: ORTHOPAEDIC SURGERY

## 2024-04-04 PROCEDURE — 6360000002 HC RX W HCPCS: Performed by: INTERNAL MEDICINE

## 2024-04-04 RX ORDER — BACLOFEN 10 MG/1
5 TABLET ORAL ONCE
Status: COMPLETED | OUTPATIENT
Start: 2024-04-04 | End: 2024-04-04

## 2024-04-04 RX ADMIN — SENNOSIDES 8.6 MG: 8.6 TABLET, FILM COATED ORAL at 10:09

## 2024-04-04 RX ADMIN — DIPHENHYDRAMINE HYDROCHLORIDE 25 MG: 25 TABLET ORAL at 20:28

## 2024-04-04 RX ADMIN — PANTOPRAZOLE SODIUM 40 MG: 40 TABLET, DELAYED RELEASE ORAL at 06:04

## 2024-04-04 RX ADMIN — SENNOSIDES 8.6 MG: 8.6 TABLET, FILM COATED ORAL at 20:28

## 2024-04-04 RX ADMIN — ENOXAPARIN SODIUM 40 MG: 100 INJECTION SUBCUTANEOUS at 20:28

## 2024-04-04 RX ADMIN — ROPINIROLE HYDROCHLORIDE 4 MG: 2 TABLET, FILM COATED ORAL at 20:30

## 2024-04-04 RX ADMIN — ASPIRIN 81 MG: 81 TABLET, CHEWABLE ORAL at 10:09

## 2024-04-04 RX ADMIN — ATORVASTATIN CALCIUM 10 MG: 10 TABLET, FILM COATED ORAL at 10:09

## 2024-04-04 RX ADMIN — MEROPENEM 1000 MG: 1 INJECTION, POWDER, FOR SOLUTION INTRAVENOUS at 04:05

## 2024-04-04 RX ADMIN — TIZANIDINE 2 MG: 2 TABLET ORAL at 20:28

## 2024-04-04 RX ADMIN — VANCOMYCIN HYDROCHLORIDE 1500 MG: 1.5 INJECTION, POWDER, LYOPHILIZED, FOR SOLUTION INTRAVENOUS at 15:47

## 2024-04-04 RX ADMIN — NYSTATIN 500000 UNITS: 100000 SUSPENSION ORAL at 10:10

## 2024-04-04 RX ADMIN — ACETAMINOPHEN 650 MG: 325 TABLET ORAL at 10:10

## 2024-04-04 RX ADMIN — ACETAMINOPHEN 650 MG: 325 TABLET ORAL at 20:28

## 2024-04-04 RX ADMIN — BACLOFEN 5 MG: 10 TABLET ORAL at 11:29

## 2024-04-04 RX ADMIN — DIPHENHYDRAMINE HYDROCHLORIDE 25 MG: 25 TABLET ORAL at 00:13

## 2024-04-04 RX ADMIN — PRIMIDONE 50 MG: 50 TABLET ORAL at 10:11

## 2024-04-04 RX ADMIN — SODIUM CHLORIDE, PRESERVATIVE FREE 10 ML: 5 INJECTION INTRAVENOUS at 20:33

## 2024-04-04 RX ADMIN — RANOLAZINE 500 MG: 500 TABLET, EXTENDED RELEASE ORAL at 20:28

## 2024-04-04 RX ADMIN — PRIMIDONE 50 MG: 50 TABLET ORAL at 20:30

## 2024-04-04 RX ADMIN — CLOPIDOGREL BISULFATE 75 MG: 75 TABLET ORAL at 10:14

## 2024-04-04 RX ADMIN — INSULIN GLARGINE 10 UNITS: 100 INJECTION, SOLUTION SUBCUTANEOUS at 20:29

## 2024-04-04 RX ADMIN — MEROPENEM 1000 MG: 1 INJECTION, POWDER, FOR SOLUTION INTRAVENOUS at 11:31

## 2024-04-04 RX ADMIN — RANOLAZINE 500 MG: 500 TABLET, EXTENDED RELEASE ORAL at 10:09

## 2024-04-04 RX ADMIN — ACETAMINOPHEN 650 MG: 325 TABLET ORAL at 14:38

## 2024-04-04 ASSESSMENT — PAIN DESCRIPTION - LOCATION: LOCATION: KNEE

## 2024-04-04 ASSESSMENT — PAIN SCALES - GENERAL: PAINLEVEL_OUTOF10: 4

## 2024-04-04 ASSESSMENT — PAIN SCALES - WONG BAKER: WONGBAKER_NUMERICALRESPONSE: HURTS WHOLE LOT

## 2024-04-04 NOTE — CARE COORDINATION
Writer is following discharge to [facility].  Writer placed message to Arnold with [facility] regarding pt need for wound vac upon admission to facility. Writer also verified IV Vanco until 4/24.    Arnold states pt has a wound care appt tomorrow at 0945 if pt is not able to admit facility will have to get this appointment rescheduled.  Electronically signed by CARY Waddell on 4/4/2024 at 10:47 AM

## 2024-04-04 NOTE — PROGRESS NOTES
[facility] -   Infectious diseases evaluation  admission date 4/2/2024    reason for consultation:   Left leg cellulitis    Impression :   Current:  Nonhealing left knee wound Status post incision and drainage repair of quadriceps tendon, wound lavage and closure 4/2/24.    Left  knee infected prosthesis status post irrigation and debridement with application of vancomycin powder and wound VAC 3/8/2024 left knee wound culture and intraoperative culture grew MRSA, vancomycin SARAH of 1  Pyuria /chronic Winters catheter  Status post left total hip arthroplasty/displaced periprosthetic femur fracture status post left total knee arthroplasty revision 1/23/24  History of ITP  CVA  Diabetes mellitus  Hypertension  Hyperlipidemia  Multiple antibiotics allergy  History of multidrug-resistant organism   Cephalosporin, sulfa penicillin, Cipro, doxycycline and clindamycin allergy  Mild thrombocytopenia      HENCE:   Continue IV vancomycin through 4/24/2024  D/c IV meropenem   Right arm venous Doppler was ordered  Follow CBC and BUN/creatinine weekly while on IV antibiotics      Infection Control Recommendations   [place] Precautions  Contact Isolation       Antimicrobial Stewardship Recommendations   Simplification of therapy  Targeted therapy      History of Present Illness:   Initial history:  Komal Crawford is a 83 y.o.-year-old female presented to hospital for nonhealing left knee wound with prosthesis exposure.  Status post incision and drainage repair of quadriceps tendon, wound lavage and closure 4/2/24.  Reportedly had episodes of hypotension in the OR  She is afebrile, sleepy, arousable, denied significant pain, denied cough or shortness of breath, no other complaints.  The patient was hospitalized last month was left knee prosthetic infection status post irrigation and debridement with application of vancomycin powder and wound VAC 3/8/2024 left knee wound culture and  About Running Out of Food in the Last Year: Never true     Ran Out of Food in the Last Year: Never true   Transportation Needs: No Transportation Needs (2024)    PRAPARE - Transportation     Lack of Transportation (Medical): No     Lack of Transportation (Non-Medical): No   Physical Activity: Inactive (2023)    Exercise Vital Sign     Days of Exercise per Week: 0 days     Minutes of Exercise per Session: 0 min   Stress: Stress Concern Present (2023)    [employer] [employer] - Occupational Stress Questionnaire     Feeling of Stress : To some extent   Social Connections: Moderately Integrated (2023)    Social Connection and Isolation Panel [NHANES]     Frequency of Communication with Friends and Family: Three times a week     Frequency of Social Gatherings with Friends and Family: Once a week     Attends Religion Services: More than 4 times per year     Active Member of Clubs or Organizations: No     Attends Club or Organization Meetings: Never     Marital Status:    Intimate Partner Violence: Not on file   Housing Stability: Low Risk  (2024)    Housing Stability Vital Sign     Unable to Pay for Housing in the Last Year: No     Number of Places Lived in the Last Year: 1     Unstable Housing in the Last Year: No       Family History:     Family History   Problem Relation Age of Onset    Breast Cancer Mother     Cancer Mother         breast and uterine  41    Heart Disease Father     Heart Attack Father          32    Cancer Maternal Grandmother     Cancer Brother 52        bronchogenic adenocarcinoma cancer    Lung Cancer Brother     Cancer Sister         lung    Lung Cancer Sister          65    Breast Cancer Sister          57    Cancer Sister         breast      Medical Decision Making:   I have independently reviewed/ordered the following labs:    CBC with Differential:   Recent Labs     24  0553   WBC 9.2   HGB 7.5*   HCT  23.6*      LYMPHOPCT 15*   MONOPCT 10*       BMP:  Recent Labs     04/03/24  0622 04/03/24  1456 04/04/24  0553    135 137   K 3.5* 4.1 3.6*   * 105 106   CO2 20 19* 21   BUN 13 12 11   CREATININE 0.6 0.7 0.5   MG 1.6  --  1.6       Hepatic Function Panel:   Recent Labs     04/03/24  1612   PROT 5.5*   LABALBU 2.3*   BILIDIR 0.1   IBILI 0.2   BILITOT 0.3   ALKPHOS 77   ALT 10   AST 14       No results for input(s): \"RPR\" in the last 72 hours.  No results for input(s): \"HIV\" in the last 72 hours.  No results for input(s): \"BC\" in the last 72 hours.  Lab Results   Component Value Date/Time    CREATININE 0.5 04/04/2024 05:53 AM    GLUCOSE 99 04/04/2024 05:53 AM    GLUCOSE 168 06/01/2023 08:59 AM       Detailed results:        Thank you for allowing us to participate in the care of this patient.Please call with questions.    This note is created with the assistance of a speech recognition program.  While intending to generate adocument that actually reflects the content of the visit, the document can still have some errors including those of syntax and sound a like substitutions which may escape proof reading.  It such instances, actual meaningcan be extrapolated by contextual diversion.    David Arteaga MD  Office: [phone]  Perfect serve / office [phone]

## 2024-04-04 NOTE — CARE COORDINATION
IMM letter provided to patient.  Patient offered four hours to make informed decision regarding appeal process; patient agreeable to discharge.   Electronically signed by CARY Waddell on 4/4/2024 at 11:44 AM

## 2024-04-04 NOTE — PROGRESS NOTES
04/04/24 1514   Encounter Summary   Encounter Overview/Reason  Spiritual/Emotional Needs   Service Provided For: Patient   Referral/Consult From: Rounding   Complexity of Encounter Low   Spiritual/Emotional needs   Type Spiritual Support   Assessment/Intervention/Outcome   Assessment Unable to assess   Intervention Prayer (assurance of)/Elsie

## 2024-04-04 NOTE — PROGRESS NOTES
Just spoke with Dr. Keyes... ace wrap can come off today; however, the preevna dressing (wound dressing) should NOT be touched that will stay on and not be touched for 14 days... it is attached to a wound vac right now but can be switched to the preevna plus machine once discharged...

## 2024-04-04 NOTE — PLAN OF CARE
Problem: Discharge Planning  Goal: Discharge to home or other facility with appropriate resources  4/4/2024 1552 by Sanaz Yang RN  Outcome: Progressing     Problem: Chronic Conditions and Co-morbidities  Goal: Patient's chronic conditions and co-morbidity symptoms are monitored and maintained or improved  4/4/2024 1552 by Sanaz Yang RN  Outcome: Progressing     Problem: Pain  Goal: Verbalizes/displays adequate comfort level or baseline comfort level  4/4/2024 1552 by Sanaz Yang RN  Outcome: Progressing     Problem: Skin/Tissue Integrity  Goal: Absence of new skin breakdown  Description: 1.  Monitor for areas of redness and/or skin breakdown  2.  Assess vascular access sites hourly  3.  Every 4-6 hours minimum:  Change oxygen saturation probe site  4.  Every 4-6 hours:  If on nasal continuous positive airway pressure, respiratory therapy assess nares and determine need for appliance change or resting period.  4/4/2024 1552 by Sanaz Yang RN  Outcome: Progressing     Problem: Safety - Adult  Goal: Free from fall injury  4/4/2024 1552 by Sanaz Yang RN  Outcome: Progressing     Problem: ABCDS Injury Assessment  Goal: Absence of physical injury  4/4/2024 1552 by Sanaz Yang RN  Outcome: Progressing

## 2024-04-04 NOTE — PLAN OF CARE
Problem: Discharge Planning  Goal: Discharge to home or other facility with appropriate resources  4/4/2024 0438 by Sandhya Bennett, RN  Outcome: Progressing  Flowsheets (Taken 4/3/2024 2158)  Discharge to home or other facility with appropriate resources:   Identify barriers to discharge with patient and caregiver   Arrange for needed discharge resources and transportation as appropriate   Identify discharge learning needs (meds, wound care, etc)   Refer to discharge planning if patient needs post-hospital services based on physician order or complex needs related to functional status, cognitive ability or social support system     Problem: Chronic Conditions and Co-morbidities  Goal: Patient's chronic conditions and co-morbidity symptoms are monitored and maintained or improved  4/4/2024 0438 by Sandhya Bennett, RN  Outcome: Progressing  Flowsheets (Taken 4/3/2024 2158)  Care Plan - Patient's Chronic Conditions and Co-Morbidity Symptoms are Monitored and Maintained or Improved:   Monitor and assess patient's chronic conditions and comorbid symptoms for stability, deterioration, or improvement   Collaborate with multidisciplinary team to address chronic and comorbid conditions and prevent exacerbation or deterioration   Update acute care plan with appropriate goals if chronic or comorbid symptoms are exacerbated and prevent overall improvement and discharge     Problem: Pain  Goal: Verbalizes/displays adequate comfort level or baseline comfort level  4/4/2024 0438 by Sandhya Bennett, RN  Outcome: Progressing     Problem: Skin/Tissue Integrity  Goal: Absence of new skin breakdown  Description: 1.  Monitor for areas of redness and/or skin breakdown  2.  Assess vascular access sites hourly  3.  Every 4-6 hours minimum:  Change oxygen saturation probe site  4.  Every 4-6 hours:  If on nasal continuous positive airway pressure, respiratory therapy assess nares and determine need for appliance change or resting  period.  4/4/2024 0438 by Sandhya Bennett RN  Outcome: Progressing     Problem: Safety - Adult  Goal: Free from fall injury  4/4/2024 0438 by Sandhya Bennett RN  Outcome: Progressing  Flowsheets (Taken 4/4/2024 0118)  Free From Fall Injury:   Based on caregiver fall risk screen, instruct family/caregiver to ask for assistance with transferring infant if caregiver noted to have fall risk factors   Instruct family/caregiver on patient safety     Problem: ABCDS Injury Assessment  Goal: Absence of physical injury  4/4/2024 0438 by Sandhya Bennett RN  Outcome: Progressing  Flowsheets (Taken 4/4/2024 0118)  Absence of Physical Injury: Implement safety measures based on patient assessment

## 2024-04-04 NOTE — PROGRESS NOTES
[facility] -   Infectious diseases evaluation  admission date 3/12/2024    reason for consultation:   Antibiotic Management    Impression :   Current:  Left  knee postoperative wound infection /cellulitis  3/8    S/p I & D with application of Vancomycin powder and wound vac.  Hardware was retained.  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  Allergies to multiple antibiotics.  Chronic reeves catheter/changed 3/6/24.    Other:    Discussion / summary of stay / plan of care/ Recommendations:     HENCE:   Vancomycin IV, pharmacy to dose x 6 weeks through 4/24/24.  Nystatin swish and swallow QID while on antibiotics.  Follow CBC and renal function closely.  Wound care.  Supportive care.  Discussed with patient.    Infection Control Recommendations   [place] Precautions  Contact Isolation     Antimicrobial Stewardship Recommendations   Simplification of therapy  Targeted therapy    History of Present Illness:   Initial history:  Komal Crawford is a 83 y.o.-year-old female s/p left total knee arthroplasty revision on 1/23/24 who presented to [facility] on 3/6/24 with left knee incision wound with swelling, warmth and pain with associated fever of 102.6.  Patient underwent I & D of the left knee, application of Vancomycin powder and wound vac placement on 3/8/24.  Intra-operative wound culture grew MRSA.  Treated with Vancomycin IV while at [facility]. Discharged to [facility] on 3/12/24 with plan for Vancomycin IV through 4/24/24.    Interval changes 4/2/2024   Sitting up in wc.  Feeling good today.  Left knee with wound vac.  Minimal swelling.  RUE PICC site clean.  Denies any fever, chills, n/v/d.  Scheduled for I & D to the left knee this afternoon at [facility].      Summary of relevant labs:  Labs:  WBC: 6.7-6.5-7.5-8.0  Eosinophils: 9-7  Cre: 0.5-0.6-0.5  Vanco: 13.4    Micro:  3/8 Intra-op Wound cx:

## 2024-04-04 NOTE — PROGRESS NOTES
Post Operative Progress Note    4/4/2024 11:14 AM  Info:   Admit Date: 4/2/2024  PCP: Sabiha Bedolla MD      Medications:   Scheduled Meds:   baclofen  5 mg Oral Once    meropenem  1,000 mg IntraVENous Q8H    sodium chloride flush  5-40 mL IntraVENous 2 times per day    acetaminophen  650 mg Oral Q6H    enoxaparin  40 mg SubCUTAneous Daily    aspirin  81 mg Oral Daily    atorvastatin  10 mg Oral Daily    clopidogrel  75 mg Oral Daily    senna  1 tablet Oral BID    rOPINIRole  4 mg Oral Nightly    ranolazine  500 mg Oral BID    primidone  50 mg Oral BID    pantoprazole  40 mg Oral QAM AC    nystatin  500,000 Units Oral 4x Daily    insulin glargine  10 Units SubCUTAneous Nightly    insulin lispro  0-8 Units SubCUTAneous TID WC    insulin lispro  0-4 Units SubCUTAneous Nightly     Continuous Infusions:   sodium chloride      dextrose       PRN Meds:potassium chloride **OR** potassium chloride, magnesium sulfate, HYDROcodone-acetaminophen, diphenhydrAMINE, sodium chloride flush, sodium chloride, tiZANidine, midodrine, glucose, dextrose bolus **OR** dextrose bolus, glucagon (rDNA), dextrose    Diet:   Diet: ADULT DIET; Regular; 4 carb choices (60 gm/meal); Low Potassium (Less than 3000 mg/day)  ADULT ORAL NUTRITION SUPPLEMENT; Breakfast, Lunch, Dinner; Diabetic Oral Supplement    Subjective:   Systemic or Specific Complaints: Not complaining about left knee pain with complaint of swelling in her arm likely secondary to her PICC line.  IR has been contacted to reevaluate    Objective:     Patient Vitals for the past 24 hrs:   BP Temp Temp src Pulse Resp SpO2   04/04/24 0844 129/61 98.8 °F (37.1 °C) Oral 80 18 92 %   04/04/24 0408 98/77 99 °F (37.2 °C) Axillary 68 16 93 %   04/04/24 0018 (!) 105/50 99.3 °F (37.4 °C) Oral 74 18 91 %   04/03/24 2021 (!) 110/54 98.1 °F (36.7 °C) Oral 80 18 93 %   04/03/24 1629 (!) 153/60 98.4 °F (36.9 °C) Oral 73 17 96 %         Wound: Incision still covered with Ace bandage.  Patient has

## 2024-04-04 NOTE — PROGRESS NOTES
Occupational Therapy  [facility]   INPATIENT OCCUPATIONAL THERAPY  PROGRESS NOTE  Date: 2024  Patient Name: Komal Crawford       Room:   MRN: [id number]    : 1940  (83 y.o.)  Gender: female   Referring Practitioner: Dr. Keyes  Diagnosis: 24 LEFT KNEE INCISION AND DRAINAGE, REPAIR OF QUADRACEPTS TENDON, BACTISURE WOUND LAVAGE, AND WOUND CLOSURE WITH PREVENA APPLICATION      Discharge Recommendations:  Further Occupational Therapy is recommended upon facility discharge.    OT Equipment Recommendations  Other: TBD    Restrictions/Precautions  Restrictions/Precautions  Restrictions/Precautions: Weight Bearing (urinary catheter, PICC right brachial, ace wrap w/ wound vac under don chasidy left knee brace locked in extension)  Required Braces or Orthoses?: Yes (don chasidy left knee brace locked in extension)  Implants present? : Metal implants (lumbar surgery, right THR, left THR, left TKR, right TKR, cardiac stent)  Lower Extremity Weight Bearing Restrictions  Left Lower Extremity Weight Bearing: Weight Bearing As Tolerated (don chasidy left knee brace locked in extension)  Position Activity Restriction  Other position/activity restrictions: leg length discrepancy- wears black lift for right shoe due to leg length discrepancy (right shorter than the left)    O2 Device: None (Room air)    Subjective  Subjective  Subjective: pt states that she is in a lot of pain but willing to do what she can. pt c/o R thumb being numb and RUE dicomfort and weak grasp. Patient reporting numbness in right thumb after PICC line placement last week, RUE feeling warmer than LUE. Faina DAVIDSON and Dr. Shea aware and assessed pt of above complaints.   Subjective  Subjective: pt began coughing after drinking liquids given x 2. Sanaz DAVIDSON notified and states that she will look into this further.  Pain: pt reports \"/10\" pain in L knee and groin. RN in room and meds given.  Comments: Pt is very motivated and

## 2024-04-04 NOTE — PROGRESS NOTES
Vancomycin Dosing by Pharmacy - Initial Note   TODAY'S DATE:  4/4/2024  Patient name, age:  Komal Crawford, 83 y.o.    Indication:  Bone/Joint  Additional antimicrobials:  no    Allergies:  Iodides, Iodinated contrast media, Povidone-iodine, Sulfa antibiotics, Betadine [povidone iodine], Cephalexin, Cephalexin, Cozaar [losartan], Cymbalta [duloxetine hcl], Demerol, Doxycycline, Duloxetine, Meperidine, Morphine, Penicillins, Zithromax [azithromycin], Ciprofloxacin, Clindamycin/lincomycin, Etodolac, Fesoterodine, Fluorescein, Iodine, Lisinopril, Penicillin g, Toviaz [fesoterodine fumarate er], Clonazepam, and Metformin and related   Actual Weight:    Wt Readings from Last 1 Encounters:   04/02/24 75.3 kg (166 lb)     Labs/Ancillary Data  Estimated Creatinine Clearance: 80 mL/min (based on SCr of 0.5 mg/dL).  Recent Labs     04/03/24  0622 04/03/24  1456 04/04/24  0553   CREATININE 0.6 0.7 0.5   BUN 13 12 11   WBC  --   --  9.2     Procalcitonin   Date Value Ref Range Status   03/06/2024 0.13 (H) <0.09 ng/mL Final     Comment:           Suspected Sepsis:  <0.50 ng/mL     Low likelihood of sepsis.  0.50-2.00 ng/mL     Increased likelihood of sepsis. Antibiotics encouraged.  >2.00 ng/mL     High risk of sepsis/shock. Antibiotics strongly encouraged.        Suspected Lower Resp Tract Infections:  <0.24 ng/mL     Low likelihood of bacterial infection.  >0.24 ng/mL     Increased likelihood of bacterial infection. Antibiotics encouraged.        With successful antibiotic therapy, PCT levels should decrease rapidly. (Half-life of 24 to   36 hours.)        Procalcitonin values from samples collected within the first 6 hours of systemic infection   may still be low. Retesting may be indicated.  Values from day 1 and day 4 can be entered into the Change in Procalcitonin Calculator   ([email or web address]-pct-calculator.com) to determine the patient's Mortality Risk Prognosis        In healthy neonates, plasma Procalcitonin (PCT)  400: 92 %  Ctrough,ss: 16 mg/L  Probability of Ctrough,ss > 20: 23 %  Probability of nephrotoxicity (Lodise HUYEN 2009): 11 %      Thank you for the consult.  Pharmacy will continue to follow.   Jon Hudson RPH,RP, MS   4/4/2024  2:55 PM

## 2024-04-04 NOTE — PROGRESS NOTES
Physical Therapy  Rehabilitation Physical Therapy    Date: 2024  Patient Name: Komal Crawford      Room:   MRN: [id number]    : 1940  (83 y.o.)  Gender: female   Referring Practitioner: Dr. AMEE Keyes  Diagnosis: open left  knee wound  Additional Pertinent Hx: Pt had a left HIP TOTAL ARTHROPLASTY by Dr. JORDYN Bonner on 2024. Pt had surgery on 2024 due to a Periprosthetic fracture of femur at tip of prosthesis for a left KNEE TOTAL ARTHROPLASTY REVISION WITH OSS by Dr. AMEE Keyes.    Discharge Recommendations:  Patient would benefit from continued therapy after discharge, Therapy recommended at discharge  Equipment Needed:  (TBD)    Assessment  Assessment  Activity Tolerance: Patient limited by fatigue;Patient limited by pain;Patient limited by endurance  Discharge Recommendations: Patient would benefit from continued therapy after discharge;Therapy recommended at discharge    Plan  Physical Therapy Plan  General Plan:  (daily x 1 week)  Specific Instructions for Next Treatment: cotreat w/ OT, recommend pain meds prior- coordinate w/ nursing staff, dangle at the EOB and attempt standing using wheeled walker left LE FWBAT w/ don chasidy brace locked in extension and shoe lift on right shoe, instruct in general strengthening and balance- DO NOT REMOVE DON CHASIDY BRACE LEFT KNEE  Current Treatment Recommendations: Strengthening, ROM, Balance training, Functional mobility training, Endurance training, Transfer training, Gait training, Safety education & training, Patient/Caregiver education & training, Equipment evaluation, education, & procurement, Positioning, Therapeutic activities, Co-Treatment     Restrictions  Restrictions/Precautions: Weight Bearing (urinary catheter, PICC right brachial, ace wrap w/ wound vac under don chasidy left knee brace locked in extension)  Implants present? : Metal implants (lumbar surgery, right THR, left THR, left TKR, right TKR, cardiac stent)  Other position/activity  strengthening exercises right LEs, energy conservation and balance activities.  Short Term Goal 3: pt to demonstrate increased strength by 1/2 MMG left hip and ankle and right LE  to assist standing and transfers  Short Term Goal 4: pt to demonstrate bed mobility w/ min x 2 for position change for rolling and supine <> sit  Short Term Goal 5: pt to demonstrate fair + or better sitting balance at the EOB w/ SBA and increase sitting tolerance to 15 minutes to engage the core muscles  Short Term Goal 6: pt to demonstrate sit <> stand transfers using wheeled walker left LE FWBAT w/ don chasidy brace locked in extension and right shoe lift w/ mod x 2  Short Term Goal 7: pt to demonstrate bed <> chair  transfers using wheeled walker left LE FWBAT w/ don chasidy brace locked in extension and right shoe lift w/ mod x 2  Short Term Goal 8: pt to demonstrate ability to propel manual W/C 20-30' w/ SBA x 1 using bilateral UEs and right LE     AM-[facility] Basic Mobility - Inpatient  How much help is needed turning from your back to your side while in a flat bed without using bedrails?: Total  How much help is needed moving from lying on your back to sitting on the side of a flat bed without using bedrails?: Total  How much help is needed moving to and from a bed to a chair?: Total  How much help is needed standing up from a chair using your arms?: Total  How much help is needed walking in hospital room?: Total  How much help is needed climbing 3-5 steps with a railing?: Total  AM-PAC Inpatient Mobility Raw Score : 6  AM-PAC Inpatient T-Scale Score : 23.55  Mobility Inpatient CMS 0-100% Score: 100  Mobility Inpatient CMS G-Code Modifier : CN     PT Individual Minutes  Time In: 0947  Time Out: 1057  Minutes: 70    Electronically signed by An Dejesus PTA on 4/4/24 at 1:33 PM EDT

## 2024-04-04 NOTE — PROGRESS NOTES
Writer notified LETY Flores that the numbness that the patient is experiencing in her right thumb per Dr. Lal is probably not the PICC, that she confirms is working properly with brisk blood return and flushes well, site is clean, dry and intact.  Dr. Lal recommends Ultrasound to check for DVT if attending is concerned for blood clot.  Per LETY Flores dopplers have been ordered.

## 2024-04-05 ENCOUNTER — HOSPITAL ENCOUNTER (OUTPATIENT)
Dept: WOUND CARE | Age: 84
Discharge: HOME | End: 2024-04-05

## 2024-04-05 LAB
ABO/RH: NORMAL
ANION GAP SERPL CALCULATED.3IONS-SCNC: 8 MMOL/L (ref 9–17)
ANTIBODY SCREEN: NEGATIVE
ARM BAND NUMBER: NORMAL
BASOPHILS # BLD: 0 K/UL (ref 0–0.2)
BASOPHILS NFR BLD: 0 % (ref 0–2)
BLOOD BANK BLOOD PRODUCT EXPIRATION DATE: NORMAL
BLOOD BANK DISPENSE STATUS: NORMAL
BLOOD BANK ISBT PRODUCT BLOOD TYPE: 5100
BLOOD BANK PRODUCT CODE: NORMAL
BLOOD BANK SAMPLE EXPIRATION: NORMAL
BLOOD BANK UNIT TYPE AND RH: NORMAL
BPU ID: NORMAL
BUN SERPL-MCNC: 12 MG/DL (ref 8–23)
CALCIUM SERPL-MCNC: 7.7 MG/DL (ref 8.6–10.4)
CHLORIDE SERPL-SCNC: 105 MMOL/L (ref 98–107)
CO2 SERPL-SCNC: 21 MMOL/L (ref 20–31)
COMPONENT: NORMAL
CREAT SERPL-MCNC: 0.6 MG/DL (ref 0.5–0.9)
CROSSMATCH RESULT: NORMAL
DATE LAST DOSE: NORMAL
EOSINOPHIL # BLD: 0.39 K/UL (ref 0–0.4)
EOSINOPHILS RELATIVE PERCENT: 5 % (ref 0–4)
ERYTHROCYTE [DISTWIDTH] IN BLOOD BY AUTOMATED COUNT: 16.4 % (ref 11.5–14.9)
GFR SERPL CREATININE-BSD FRML MDRD: 90 ML/MIN/1.73M2
GLUCOSE BLD-MCNC: 107 MG/DL (ref 65–105)
GLUCOSE BLD-MCNC: 130 MG/DL (ref 65–105)
GLUCOSE BLD-MCNC: 184 MG/DL (ref 65–105)
GLUCOSE BLD-MCNC: 62 MG/DL (ref 65–105)
GLUCOSE SERPL-MCNC: 67 MG/DL (ref 70–99)
HCT VFR BLD AUTO: 20.7 % (ref 36–46)
HCT VFR BLD AUTO: 23.1 % (ref 36–46)
HGB BLD-MCNC: 6.7 G/DL (ref 12–16)
HGB BLD-MCNC: 7.4 G/DL (ref 12–16)
LYMPHOCYTES NFR BLD: 2.08 K/UL (ref 1–4.8)
LYMPHOCYTES RELATIVE PERCENT: 27 % (ref 24–44)
MAGNESIUM SERPL-MCNC: 1.6 MG/DL (ref 1.6–2.6)
MCH RBC QN AUTO: 27.9 PG (ref 26–34)
MCHC RBC AUTO-ENTMCNC: 32.1 G/DL (ref 31–37)
MCV RBC AUTO: 86.9 FL (ref 80–100)
MONOCYTES NFR BLD: 0.46 K/UL (ref 0.1–1.3)
MONOCYTES NFR BLD: 6 % (ref 1–7)
MORPHOLOGY: ABNORMAL
MORPHOLOGY: ABNORMAL
NEUTROPHILS NFR BLD: 62 % (ref 36–66)
NEUTS SEG NFR BLD: 4.77 K/UL (ref 1.3–9.1)
PLATELET # BLD AUTO: 191 K/UL (ref 150–450)
PMV BLD AUTO: 12.1 FL (ref 6–12)
POTASSIUM SERPL-SCNC: 3.4 MMOL/L (ref 3.7–5.3)
RBC # BLD AUTO: 2.39 M/UL (ref 4–5.2)
SODIUM SERPL-SCNC: 134 MMOL/L (ref 135–144)
TME LAST DOSE: 443 H
TRANSFUSION STATUS: NORMAL
UNIT DIVISION: 0
UNIT ISSUE DATE/TIME: NORMAL
VANCOMYCIN DOSE: NORMAL MG
VANCOMYCIN SERPL-MCNC: 23.9 UG/ML
WBC OTHER # BLD: 7.7 K/UL (ref 3.5–11)

## 2024-04-05 PROCEDURE — 85025 COMPLETE CBC W/AUTO DIFF WBC: CPT

## 2024-04-05 PROCEDURE — 86920 COMPATIBILITY TEST SPIN: CPT

## 2024-04-05 PROCEDURE — 6370000000 HC RX 637 (ALT 250 FOR IP): Performed by: INTERNAL MEDICINE

## 2024-04-05 PROCEDURE — 2580000003 HC RX 258: Performed by: ORTHOPAEDIC SURGERY

## 2024-04-05 PROCEDURE — 86900 BLOOD TYPING SEROLOGIC ABO: CPT

## 2024-04-05 PROCEDURE — 1200000000 HC SEMI PRIVATE

## 2024-04-05 PROCEDURE — P9016 RBC LEUKOCYTES REDUCED: HCPCS

## 2024-04-05 PROCEDURE — 97530 THERAPEUTIC ACTIVITIES: CPT

## 2024-04-05 PROCEDURE — 36415 COLL VENOUS BLD VENIPUNCTURE: CPT

## 2024-04-05 PROCEDURE — 6370000000 HC RX 637 (ALT 250 FOR IP): Performed by: NURSE PRACTITIONER

## 2024-04-05 PROCEDURE — 80048 BASIC METABOLIC PNL TOTAL CA: CPT

## 2024-04-05 PROCEDURE — 6360000002 HC RX W HCPCS: Performed by: INTERNAL MEDICINE

## 2024-04-05 PROCEDURE — 2580000003 HC RX 258: Performed by: INTERNAL MEDICINE

## 2024-04-05 PROCEDURE — 99233 SBSQ HOSP IP/OBS HIGH 50: CPT | Performed by: INTERNAL MEDICINE

## 2024-04-05 PROCEDURE — 83735 ASSAY OF MAGNESIUM: CPT

## 2024-04-05 PROCEDURE — 36430 TRANSFUSION BLD/BLD COMPNT: CPT

## 2024-04-05 PROCEDURE — 82947 ASSAY GLUCOSE BLOOD QUANT: CPT

## 2024-04-05 PROCEDURE — 80202 ASSAY OF VANCOMYCIN: CPT

## 2024-04-05 PROCEDURE — 86850 RBC ANTIBODY SCREEN: CPT

## 2024-04-05 PROCEDURE — 30233N1 TRANSFUSION OF NONAUTOLOGOUS RED BLOOD CELLS INTO PERIPHERAL VEIN, PERCUTANEOUS APPROACH: ICD-10-PCS | Performed by: ORTHOPAEDIC SURGERY

## 2024-04-05 PROCEDURE — 86901 BLOOD TYPING SEROLOGIC RH(D): CPT

## 2024-04-05 PROCEDURE — 85018 HEMOGLOBIN: CPT

## 2024-04-05 PROCEDURE — 99232 SBSQ HOSP IP/OBS MODERATE 35: CPT | Performed by: INTERNAL MEDICINE

## 2024-04-05 PROCEDURE — 6370000000 HC RX 637 (ALT 250 FOR IP): Performed by: ORTHOPAEDIC SURGERY

## 2024-04-05 PROCEDURE — 85014 HEMATOCRIT: CPT

## 2024-04-05 RX ORDER — DOCUSATE SODIUM 100 MG/1
100 CAPSULE, LIQUID FILLED ORAL 2 TIMES DAILY PRN
Status: DISCONTINUED | OUTPATIENT
Start: 2024-04-05 | End: 2024-04-06 | Stop reason: HOSPADM

## 2024-04-05 RX ORDER — POLYETHYLENE GLYCOL 3350 17 G/17G
17 POWDER, FOR SOLUTION ORAL DAILY
Status: DISCONTINUED | OUTPATIENT
Start: 2024-04-05 | End: 2024-04-06 | Stop reason: HOSPADM

## 2024-04-05 RX ORDER — SODIUM CHLORIDE 9 MG/ML
INJECTION, SOLUTION INTRAVENOUS PRN
Status: DISCONTINUED | OUTPATIENT
Start: 2024-04-05 | End: 2024-04-06 | Stop reason: HOSPADM

## 2024-04-05 RX ADMIN — DIPHENHYDRAMINE HYDROCHLORIDE 25 MG: 25 TABLET ORAL at 20:13

## 2024-04-05 RX ADMIN — POTASSIUM CHLORIDE: 149 INJECTION, SOLUTION, CONCENTRATE INTRAVENOUS at 10:51

## 2024-04-05 RX ADMIN — PANTOPRAZOLE SODIUM 40 MG: 40 TABLET, DELAYED RELEASE ORAL at 06:33

## 2024-04-05 RX ADMIN — SENNOSIDES 8.6 MG: 8.6 TABLET, FILM COATED ORAL at 08:24

## 2024-04-05 RX ADMIN — ACETAMINOPHEN 650 MG: 325 TABLET ORAL at 08:24

## 2024-04-05 RX ADMIN — HYDROCODONE BITARTRATE AND ACETAMINOPHEN 1 TABLET: 5; 325 TABLET ORAL at 20:22

## 2024-04-05 RX ADMIN — PRIMIDONE 50 MG: 50 TABLET ORAL at 20:14

## 2024-04-05 RX ADMIN — ROPINIROLE HYDROCHLORIDE 4 MG: 2 TABLET, FILM COATED ORAL at 20:14

## 2024-04-05 RX ADMIN — NYSTATIN 500000 UNITS: 100000 SUSPENSION ORAL at 08:24

## 2024-04-05 RX ADMIN — INSULIN GLARGINE 10 UNITS: 100 INJECTION, SOLUTION SUBCUTANEOUS at 20:31

## 2024-04-05 RX ADMIN — VANCOMYCIN HYDROCHLORIDE 1000 MG: 1 INJECTION, POWDER, LYOPHILIZED, FOR SOLUTION INTRAVENOUS at 04:43

## 2024-04-05 RX ADMIN — ACETAMINOPHEN 650 MG: 325 TABLET ORAL at 20:13

## 2024-04-05 RX ADMIN — RANOLAZINE 500 MG: 500 TABLET, EXTENDED RELEASE ORAL at 20:13

## 2024-04-05 RX ADMIN — ATORVASTATIN CALCIUM 10 MG: 10 TABLET, FILM COATED ORAL at 08:24

## 2024-04-05 RX ADMIN — POLYETHYLENE GLYCOL 3350 17 G: 17 POWDER, FOR SOLUTION ORAL at 10:51

## 2024-04-05 RX ADMIN — SENNOSIDES 8.6 MG: 8.6 TABLET, FILM COATED ORAL at 20:13

## 2024-04-05 RX ADMIN — SODIUM CHLORIDE, PRESERVATIVE FREE 10 ML: 5 INJECTION INTRAVENOUS at 08:19

## 2024-04-05 RX ADMIN — PRIMIDONE 50 MG: 50 TABLET ORAL at 08:24

## 2024-04-05 RX ADMIN — RANOLAZINE 500 MG: 500 TABLET, EXTENDED RELEASE ORAL at 08:24

## 2024-04-05 RX ADMIN — TIZANIDINE 2 MG: 2 TABLET ORAL at 20:13

## 2024-04-05 ASSESSMENT — PAIN SCALES - GENERAL
PAINLEVEL_OUTOF10: 4
PAINLEVEL_OUTOF10: 4

## 2024-04-05 ASSESSMENT — PAIN DESCRIPTION - LOCATION
LOCATION: KNEE
LOCATION: KNEE

## 2024-04-05 NOTE — PLAN OF CARE
Problem: Discharge Planning  Goal: Discharge to home or other facility with appropriate resources  4/5/2024 0541 by Sandhya Bennett, RN  Outcome: Progressing  Flowsheets (Taken 4/4/2024 2028)  Discharge to home or other facility with appropriate resources:   Identify barriers to discharge with patient and caregiver   Identify discharge learning needs (meds, wound care, etc)   Arrange for needed discharge resources and transportation as appropriate   Refer to discharge planning if patient needs post-hospital services based on physician order or complex needs related to functional status, cognitive ability or social support system     Problem: Chronic Conditions and Co-morbidities  Goal: Patient's chronic conditions and co-morbidity symptoms are monitored and maintained or improved  4/5/2024 0541 by Sandhya Bennett, RN  Outcome: Progressing  Flowsheets (Taken 4/4/2024 2028)  Care Plan - Patient's Chronic Conditions and Co-Morbidity Symptoms are Monitored and Maintained or Improved:   Monitor and assess patient's chronic conditions and comorbid symptoms for stability, deterioration, or improvement   Collaborate with multidisciplinary team to address chronic and comorbid conditions and prevent exacerbation or deterioration   Update acute care plan with appropriate goals if chronic or comorbid symptoms are exacerbated and prevent overall improvement and discharge     Problem: Pain  Goal: Verbalizes/displays adequate comfort level or baseline comfort level  4/5/2024 0541 by Sandhya Bennett, RN  Outcome: Progressing  Flowsheets (Taken 4/4/2024 2040)  Verbalizes/displays adequate comfort level or baseline comfort level:   Encourage patient to monitor pain and request assistance   Assess pain using appropriate pain scale   Administer analgesics based on type and severity of pain and evaluate response     Problem: Skin/Tissue Integrity  Goal: Absence of new skin breakdown  Description: 1.  Monitor for areas of redness

## 2024-04-05 NOTE — PROGRESS NOTES
Occupational Therapy  [facility]   INPATIENT OCCUPATIONAL THERAPY  PROGRESS NOTE  Date: 2024  Patient Name: Komal Crawford       Room:   MRN: [id number]    : 1940  (83 y.o.)  Gender: female   Referring Practitioner: Dr. Keyes  Diagnosis: 24 LEFT KNEE INCISION AND DRAINAGE, REPAIR OF QUADRACEPTS TENDON, BACTISURE WOUND LAVAGE, AND WOUND CLOSURE WITH PREVENA APPLICATION      Discharge Recommendations:  Further Occupational Therapy is recommended upon facility discharge.    OT Equipment Recommendations  Other: TBD    Restrictions/Precautions  Restrictions/Precautions  Restrictions/Precautions: Weight Bearing (urinary catheter, PICC right brachial, ace wrap w/ wound vac under don chasidy left knee brace locked in extension)  Required Braces or Orthoses?: Yes (don chasidy left knee brace locked in extension)  Implants present? : Metal implants (lumbar surgery, right THR, left THR, left TKR, right TKR, cardiac stent)  Lower Extremity Weight Bearing Restrictions  Left Lower Extremity Weight Bearing: Weight Bearing As Tolerated (don chasidy left knee brace locked in extension)  Position Activity Restriction  Other position/activity restrictions: leg length discrepancy- wears black lift for right shoe due to leg length discrepancy (right shorter than the left)    O2 Device: None (Room air)    Subjective  Subjective  Subjective: pt states that she was just repositioned in bed prior to writer arriving and feels comfortable. pt declines sitting EOB. BUE exercises completed in semi-Fowlers position  Subjective  Pain: pt reports 7/10 pain in L foot, \"my foot spasms and it can last a minute or up to several minutes.\"  Comments: Sanaz DAVIDSON oks tx    Objective  Orientation  Overall Orientation Status: Within Functional Limits  Orientation Level: Oriented to place;Oriented to person;Oriented to time;Oriented to situation  Cognition  Overall Cognitive Status: WFL    Activities of Daily

## 2024-04-05 NOTE — PROGRESS NOTES
Vancomycin Dosing by Pharmacy - Daily Note   Vancomycin Therapy Day:  2  Indication: Osteomyelitis    Allergies:  Iodides, Iodinated contrast media, Povidone-iodine, Sulfa antibiotics, Betadine [povidone iodine], Cephalexin, Cephalexin, Cozaar [losartan], Cymbalta [duloxetine hcl], Demerol, Doxycycline, Duloxetine, Meperidine, Morphine, Penicillins, Zithromax [azithromycin], Ciprofloxacin, Clindamycin/lincomycin, Etodolac, Fesoterodine, Fluorescein, Iodine, Lisinopril, Penicillin g, Toviaz [fesoterodine fumarate er], Clonazepam, and Metformin and related   Actual Weight:    Wt Readings from Last 1 Encounters:   04/02/24 75.3 kg (166 lb)       Labs/Ancillary Data  Estimated Creatinine Clearance: 67 mL/min (based on SCr of 0.6 mg/dL).  Recent Labs     04/03/24  1456 04/04/24  0553 04/05/24  0644   CREATININE 0.7 0.5 0.6   BUN 12 11 12   WBC  --  9.2 7.7     Procalcitonin   Date Value Ref Range Status   03/06/2024 0.13 (H) <0.09 ng/mL Final     Comment:           Suspected Sepsis:  <0.50 ng/mL     Low likelihood of sepsis.  0.50-2.00 ng/mL     Increased likelihood of sepsis. Antibiotics encouraged.  >2.00 ng/mL     High risk of sepsis/shock. Antibiotics strongly encouraged.        Suspected Lower Resp Tract Infections:  <0.24 ng/mL     Low likelihood of bacterial infection.  >0.24 ng/mL     Increased likelihood of bacterial infection. Antibiotics encouraged.        With successful antibiotic therapy, PCT levels should decrease rapidly. (Half-life of 24 to   36 hours.)        Procalcitonin values from samples collected within the first 6 hours of systemic infection   may still be low. Retesting may be indicated.  Values from day 1 and day 4 can be entered into the Change in Procalcitonin Calculator   ([email or web address]-pct-[email or web address]) to determine the patient's Mortality Risk Prognosis        In healthy neonates, plasma Procalcitonin (PCT) concentrations increase gradually after   birth, reaching peak values at about 24

## 2024-04-05 NOTE — PROGRESS NOTES
[facility]   IN-PATIENT SERVICE   [facility]    Progress note            Date:   4/4/2024  Patient name:  Komal Crawford  Date of admission:  4/2/2024 12:07 PM  MRN:   [id number]  Account:  [id number]  YOB: 1940  PCP:    Sabiha Bedolla MD  Room:   2073/2073-  Code Status:    Full Code    Chief Complaint:     Medical management    History Obtained From:     patient    History of Present Illness:     The patient is a 83 y.o.  Non- / non  female who presents with No chief complaint on file.   and she is admitted to the hospital for the management of left knee incision and drainage, repair of Quadriceps tendon, wound lavage and closure.  Internal medicine service consulted for medical management.    83-year-old female with history of neurogenic bladder, was performing straight in and out caths at home however more recently has a Winters inserted, chronic ITP, history of CVA, diabetes mellitus type 2, essential tremor, hyperlipidemia, hypertension was admitted to the hospital for left knee incision and drainage.    When seen today the patient is somnolent however awakens to verbal stimulus,  at bedside who reports that the patient does get this way after receiving anesthesia.  The patient denies nausea vomiting.  Nursing staff report concerns for decreased urine output since the patient arrived from the procedure yesterday.  She did not receive contrast, no NSAIDs were administered per my assessment on the chart.  Unable to tell if had episodes of hypotension in the OR.    Denies having fevers chills.    4/4/2024  Patient reports numbness in the right thumb, this was acute in onset today morning.  Denies having pain.  She had a PICC line replaced yesterday.    Past Medical History:     Past Medical History:   Diagnosis Date    Acquired absence of both cervix and uterus     Acquired absence of other organs     Age-related cognitive decline     Age-related  UNIT/GM-% cream Apply topically 4 times daily Apply topically 4 times daily.  Patient not taking: Reported on 3/22/2024 2/8/24   Sabiha Bedolla MD   tiZANidine (ZANAFLEX) 2 MG tablet Take 1 tablet by mouth nightly as needed (for pain) 1/10/24   Benja Cedillo PA   ranolazine (RANEXA) 500 MG extended release tablet Take 1 tablet by mouth 2 times daily 1/9/24   Sakshi Johnson MD   bisacodyl (DULCOLAX) 5 MG EC tablet Take 1 tablet by mouth 2 times daily as needed for Constipation 1/9/24   Sakshi Johnson MD   polyethylene glycol (GLYCOLAX) 17 g packet Take 1 packet by mouth daily as needed for Constipation 1/9/24   Sakshi Johnson MD   insulin glargine (LANTUS SOLOSTAR) 100 UNIT/ML injection pen Inject 10 Units into the skin nightly 1/9/24   Sakshi Johnson MD   Insulin Pen Needle ([employer] PEN NEEDLES 29G) 29G X 12MM MISC 1 each by Does not apply route daily 1/9/24   Sakshi Johnson MD   atorvastatin (LIPITOR) 10 MG tablet Take 1 tablet by mouth 4/1/23   Ana Carrington MD   primidone (MYSOLINE) 50 MG tablet Take 1 tablet by mouth 2 times daily 10/26/23 7/22/24  Sabiha Bedolla MD   glipiZIDE (GLUCOTROL) 5 MG tablet Takes 1.5 tabs in AM and 0.5 tab in PM 8/9/23   Ana Carrington MD   esomeprazole (NEXIUM) 40 MG delayed release capsule Take 1 capsule by mouth every morning (before breakfast)  Patient not taking: Reported on 3/22/2024 4/17/23   Sabiha Bedolla MD   rOPINIRole (REQUIP) 4 MG tablet Take 1 tablet by mouth nightly 1/23/23   Sabiha Bedolla MD   clopidogrel (PLAVIX) 75 MG tablet Take 1 tablet by mouth daily 8/5/22   Ana Carrington MD   acetaminophen (TYLENOL) 500 MG tablet Take 2 tablets by mouth every 6 hours as needed for Pain  Patient not taking: Reported on 3/22/2024 3/20/22   Delat Teixeira MD   Continuous Blood Gluc Sensor (FREESTYLE DAVIS 14 DAY SENSOR) [facility]  2/17/20   Provider, MD Ana   Continuous Blood Gluc  (FREESTYLE DAVIS 14 DAY

## 2024-04-05 NOTE — PROGRESS NOTES
[facility] -   Infectious diseases evaluation  admission date 4/2/2024    reason for consultation:   Left leg cellulitis    Impression :   Current:  Nonhealing left knee wound Status post incision and drainage repair of quadriceps tendon, wound lavage and closure 4/2/24.    Left  knee infected prosthesis status post irrigation and debridement with application of vancomycin powder and wound VAC 3/8/2024 left knee wound culture and intraoperative culture grew MRSA, vancomycin SARAH of 1  Pyuria /chronic Winters catheter  Status post left total hip arthroplasty/displaced periprosthetic femur fracture status post left total knee arthroplasty revision 1/23/24  History of ITP  CVA  Diabetes mellitus  Hypertension  Hyperlipidemia  Multiple antibiotics allergy  History of multidrug-resistant organism   Cephalosporin, sulfa penicillin, Cipro, doxycycline and clindamycin allergy  Mild thrombocytopenia      HENCE:   Continue IV vancomycin through 4/24/2024 followed by oral for chronic suppression  Right arm venous Doppler yesterday showed no DVT  Follow CBC and BUN/creatinine weekly while on IV antibiotics      Infection Control Recommendations   [place] Precautions  Contact Isolation       Antimicrobial Stewardship Recommendations   Simplification of therapy  Targeted therapy      History of Present Illness:   Initial history:  Komal Crawford is a 83 y.o.-year-old female presented to hospital for nonhealing left knee wound with prosthesis exposure.  Status post incision and drainage repair of quadriceps tendon, wound lavage and closure 4/2/24.  Reportedly had episodes of hypotension in the OR  She is afebrile, sleepy, arousable, denied significant pain, denied cough or shortness of breath, no other complaints.  The patient was hospitalized last month was left knee prosthetic infection status post irrigation and debridement with application of vancomycin powder and wound VAC 3/8/2024  7.5* 6.7*   HCT 23.6* 20.7*    191   LYMPHOPCT 15* 27   MONOPCT 10* 6       BMP:  Recent Labs     04/04/24  0553 04/05/24  0644    134*   K 3.6* 3.4*    105   CO2 21 21   BUN 11 12   CREATININE 0.5 0.6   MG 1.6 1.6       Hepatic Function Panel:   Recent Labs     04/03/24  1612   PROT 5.5*   LABALBU 2.3*   BILIDIR 0.1   IBILI 0.2   BILITOT 0.3   ALKPHOS 77   ALT 10   AST 14       No results for input(s): \"RPR\" in the last 72 hours.  No results for input(s): \"HIV\" in the last 72 hours.  No results for input(s): \"BC\" in the last 72 hours.  Lab Results   Component Value Date/Time    CREATININE 0.6 04/05/2024 06:44 AM    GLUCOSE 67 04/05/2024 06:44 AM    GLUCOSE 168 06/01/2023 08:59 AM       Detailed results:        Thank you for allowing us to participate in the care of this patient.Please call with questions.    This note is created with the assistance of a speech recognition program.  While intending to generate adocument that actually reflects the content of the visit, the document can still have some errors including those of syntax and sound a like substitutions which may escape proof reading.  It such instances, actual meaningcan be extrapolated by contextual diversion.    David Arteaga MD  Office: [phone]  Perfect serve / office [phone]

## 2024-04-05 NOTE — PROGRESS NOTES
No events O/N. Pain appropriately controlled.     BP (!) 156/63   Pulse 72   Temp 97.7 °F (36.5 °C)   Resp 16   Ht 1.473 m (4' 9.99\")   Wt 75.3 kg (166 lb)   SpO2 97%   BMI 34.70 kg/m²   Left knee wound vac dressing is intact and working appropriately. Moderate knee swelling present. 1+ pedal pulses. Sensation is grossly intact to light touch diffusely. Able to DF/PF her toes and foot.     Impression and plan: 83 year old lady sp left TKA I and D with wound closure POD 3  - Pain control  - Acute postop anemia. Hgb 6.7 this AM. Will be transfused 1 U pRBC  - Continue to mobilize with PT  - DC planning

## 2024-04-05 NOTE — PROGRESS NOTES
Physical Therapy  DATE: 2024    NAME: Komal Crawford  MRN: [id number]   : 1940    Patient not seen this date for Physical Therapy due to:      [] Cancel by RN or physician due to:    [] Hemodialysis    [x] Critical Lab Value Level; Hgb 6.7 this AM    [x] Blood transfusion in progress; checked on pt @ 1130. RN starting blood transfusion. Will continue to follow.     [] Acute or unstable cardiovascular status   _MAP < 55 or more than >115  _HR < 40 or > 130    [] Acute or unstable pulmonary status   -FiO2 > 60%   _RR < 5 or >40    _O2 sats < 85%    [] Strict Bedrest    [] Off Unit for surgery or procedure    [] Off Unit for testing       [] Pending imaging to R/O fracture    [] Refusal by Patient      [] Other      [] PT being discontinued at this time. Patient independent. No further needs.     [] PT being discontinued at this time as the patient has been transferred to hospice care. No further needs.      Electronically signed by Aster Quijano PTA on 24 at 11:56 AM EDT

## 2024-04-05 NOTE — PROGRESS NOTES
04/05/24 1653   Encounter Summary   Encounter Overview/Reason  Attempted Encounter   Service Provided For: Patient not available  (patient with staff)

## 2024-04-05 NOTE — PROGRESS NOTES
[facility]   IN-PATIENT SERVICE   [facility]    Progress note            Date:   4/5/2024  Patient name:  Komal Crawford  Date of admission:  4/2/2024 12:07 PM  MRN:   [id number]  Account:  [id number]  YOB: 1940  PCP:    Sabiha Bedolla MD  Room:   2073/2073-  Code Status:    Full Code    Chief Complaint:     Medical management    History Obtained From:     patient    History of Present Illness:     The patient is a 83 y.o.  Non- / non  female who presents with No chief complaint on file.   and she is admitted to the hospital for the management of left knee incision and drainage, repair of Quadriceps tendon, wound lavage and closure.  Internal medicine service consulted for medical management.    83-year-old female with history of neurogenic bladder, was performing straight in and out caths at home however more recently has a Winters inserted, chronic ITP, history of CVA, diabetes mellitus type 2, essential tremor, hyperlipidemia, hypertension was admitted to the hospital for left knee incision and drainage.    When seen today the patient is somnolent however awakens to verbal stimulus,  at bedside who reports that the patient does get this way after receiving anesthesia.  The patient denies nausea vomiting.  Nursing staff report concerns for decreased urine output since the patient arrived from the procedure yesterday.  She did not receive contrast, no NSAIDs were administered per my assessment on the chart.  Unable to tell if had episodes of hypotension in the OR.    Denies having fevers chills.    4/4/2024  Patient reports numbness in the right thumb, this was acute in onset today morning.  Denies having pain.  She had a PICC line replaced yesterday.    Past Medical History:     Past Medical History:   Diagnosis Date    Acquired absence of both cervix and uterus     Acquired absence of other organs     Age-related cognitive decline     Age-related  Called to remind patient of appointment and fasting blood work that was ordered on 2/8/21  Patient states that he is unable to get it done before this visit, because he will be out of town  joint swelling, has mild swelling in the right upper extremity.  PICC line appears clear.    Investigations:      Laboratory Testing:  Recent Results (from the past 24 hour(s))   POC Glucose Fingerstick    Collection Time: 04/04/24 11:23 AM   Result Value Ref Range    POC Glucose 162 (H) 65 - 105 mg/dL   Vascular duplex upper extremity venous right    Collection Time: 04/04/24  3:35 PM   Result Value Ref Range    Body Surface Area 1.76 m2   POC Glucose Fingerstick    Collection Time: 04/04/24  4:19 PM   Result Value Ref Range    POC Glucose 154 (H) 65 - 105 mg/dL   POC Glucose Fingerstick    Collection Time: 04/04/24  8:40 PM   Result Value Ref Range    POC Glucose 170 (H) 65 - 105 mg/dL   Magnesium    Collection Time: 04/05/24  6:44 AM   Result Value Ref Range    Magnesium 1.6 1.6 - 2.6 mg/dL   Basic Metabolic Panel    Collection Time: 04/05/24  6:44 AM   Result Value Ref Range    Sodium 134 (L) 135 - 144 mmol/L    Potassium 3.4 (L) 3.7 - 5.3 mmol/L    Chloride 105 98 - 107 mmol/L    CO2 21 20 - 31 mmol/L    Anion Gap 8 (L) 9 - 17 mmol/L    Glucose 67 (L) 70 - 99 mg/dL    BUN 12 8 - 23 mg/dL    Creatinine 0.6 0.5 - 0.9 mg/dL    Est, Glom Filt Rate 90 >60 mL/min/1.73m2    Calcium 7.7 (L) 8.6 - 10.4 mg/dL   CBC with Auto Differential    Collection Time: 04/05/24  6:44 AM   Result Value Ref Range    WBC 7.7 3.5 - 11.0 k/uL    RBC 2.39 (L) 4.0 - 5.2 m/uL    Hemoglobin 6.7 (LL) 12.0 - 16.0 g/dL    Hematocrit 20.7 (L) 36 - 46 %    MCV 86.9 80 - 100 fL    MCH 27.9 26 - 34 pg    MCHC 32.1 31 - 37 g/dL    RDW 16.4 (H) 11.5 - 14.9 %    Platelets 191 150 - 450 k/uL    MPV 12.1 (H) 6.0 - 12.0 fL    Neutrophils % 62 36 - 66 %    Lymphocytes % 27 24 - 44 %    Monocytes % 6 1 - 7 %    Eosinophils % 5 (H) 0 - 4 %    Basophils % 0 0 - 2 %    Neutrophils Absolute 4.77 1.3 - 9.1 k/uL    Lymphocytes Absolute 2.08 1.0 - 4.8 k/uL    Monocytes Absolute 0.46 0.1 - 1.3 k/uL    Eosinophils Absolute 0.39 0.0 - 0.4 k/uL    Basophils Absolute  symptoms as outlined, requirement for current medical therapies and most importantly because of direct risk to patient if care not provided in a hospital setting.    Mya Melendez MD  4/5/2024  10:18 AM    Copy sent to Sabiha Christina MD    Please note that this chart was generated using voice recognition Dragon dictation software.  Although every effort was made to ensure the accuracy of this automated transcription, some errors in transcription may have occurred.

## 2024-04-05 NOTE — PLAN OF CARE
Problem: Discharge Planning  Goal: Discharge to home or other facility with appropriate resources  4/5/2024 1650 by Sanaz Yang RN  Outcome: Progressing     Problem: Chronic Conditions and Co-morbidities  Goal: Patient's chronic conditions and co-morbidity symptoms are monitored and maintained or improved  4/5/2024 1650 by Sanaz Yang RN  Outcome: Progressing     Problem: Pain  Goal: Verbalizes/displays adequate comfort level or baseline comfort level  4/5/2024 1650 by Sanaz Yang RN  Outcome: Progressing     Problem: Skin/Tissue Integrity  Goal: Absence of new skin breakdown  Description: 1.  Monitor for areas of redness and/or skin breakdown  2.  Assess vascular access sites hourly  3.  Every 4-6 hours minimum:  Change oxygen saturation probe site  4.  Every 4-6 hours:  If on nasal continuous positive airway pressure, respiratory therapy assess nares and determine need for appliance change or resting period.  4/5/2024 1650 by Sanaz Yang RN  Outcome: Progressing     Problem: Safety - Adult  Goal: Free from fall injury  4/5/2024 1650 by Sanaz Yang RN  Outcome: Progressing     Problem: ABCDS Injury Assessment  Goal: Absence of physical injury  4/5/2024 1650 by Sanaz Yang RN  Outcome: Progressing

## 2024-04-05 NOTE — CARE COORDINATION
Writer is following for potential discharge to [facility].  Per Arnold, pt can return when medically ready.  Electronically signed by CARY Waddell on 4/5/2024 at 11:06 AM     Alert-The patient is alert, awake and responds to voice. The patient is oriented to time, place, and person. The triage nurse is able to obtain subjective information.

## 2024-04-06 VITALS
BODY MASS INDEX: 34.85 KG/M2 | WEIGHT: 166 LBS | TEMPERATURE: 97.4 F | HEART RATE: 73 BPM | DIASTOLIC BLOOD PRESSURE: 77 MMHG | OXYGEN SATURATION: 99 % | SYSTOLIC BLOOD PRESSURE: 153 MMHG | HEIGHT: 58 IN | RESPIRATION RATE: 22 BRPM

## 2024-04-06 LAB
ANION GAP SERPL CALCULATED.3IONS-SCNC: 10 MMOL/L (ref 9–17)
BASOPHILS # BLD: 0.1 K/UL (ref 0–0.2)
BASOPHILS NFR BLD: 1 % (ref 0–2)
BUN SERPL-MCNC: 11 MG/DL (ref 8–23)
CALCIUM SERPL-MCNC: 7.9 MG/DL (ref 8.6–10.4)
CHLORIDE SERPL-SCNC: 102 MMOL/L (ref 98–107)
CO2 SERPL-SCNC: 22 MMOL/L (ref 20–31)
CREAT SERPL-MCNC: 0.4 MG/DL (ref 0.5–0.9)
EOSINOPHIL # BLD: 0.5 K/UL (ref 0–0.4)
EOSINOPHILS RELATIVE PERCENT: 7 % (ref 0–4)
ERYTHROCYTE [DISTWIDTH] IN BLOOD BY AUTOMATED COUNT: 16.7 % (ref 11.5–14.9)
GFR SERPL CREATININE-BSD FRML MDRD: >90 ML/MIN/1.73M2
GLUCOSE BLD-MCNC: 146 MG/DL (ref 65–105)
GLUCOSE BLD-MCNC: 79 MG/DL (ref 65–105)
GLUCOSE SERPL-MCNC: 183 MG/DL (ref 70–99)
HCT VFR BLD AUTO: 24.9 % (ref 36–46)
HCT VFR BLD AUTO: 25.9 % (ref 36–46)
HGB BLD-MCNC: 8 G/DL (ref 12–16)
HGB BLD-MCNC: 8.4 G/DL (ref 12–16)
LYMPHOCYTES NFR BLD: 1 K/UL (ref 1–4.8)
LYMPHOCYTES RELATIVE PERCENT: 13 % (ref 24–44)
MCH RBC QN AUTO: 28.2 PG (ref 26–34)
MCHC RBC AUTO-ENTMCNC: 32.6 G/DL (ref 31–37)
MCV RBC AUTO: 86.6 FL (ref 80–100)
MONOCYTES NFR BLD: 0.5 K/UL (ref 0.1–1.3)
MONOCYTES NFR BLD: 7 % (ref 1–7)
NEUTROPHILS NFR BLD: 72 % (ref 36–66)
NEUTS SEG NFR BLD: 5.8 K/UL (ref 1.3–9.1)
PLATELET # BLD AUTO: 230 K/UL (ref 150–450)
PMV BLD AUTO: 11.5 FL (ref 6–12)
POTASSIUM SERPL-SCNC: 4 MMOL/L (ref 3.7–5.3)
RBC # BLD AUTO: 2.99 M/UL (ref 4–5.2)
SODIUM SERPL-SCNC: 134 MMOL/L (ref 135–144)
WBC OTHER # BLD: 8 K/UL (ref 3.5–11)

## 2024-04-06 PROCEDURE — 6370000000 HC RX 637 (ALT 250 FOR IP): Performed by: NURSE PRACTITIONER

## 2024-04-06 PROCEDURE — 36415 COLL VENOUS BLD VENIPUNCTURE: CPT

## 2024-04-06 PROCEDURE — 6360000002 HC RX W HCPCS: Performed by: INTERNAL MEDICINE

## 2024-04-06 PROCEDURE — 2580000003 HC RX 258: Performed by: INTERNAL MEDICINE

## 2024-04-06 PROCEDURE — 99232 SBSQ HOSP IP/OBS MODERATE 35: CPT | Performed by: INTERNAL MEDICINE

## 2024-04-06 PROCEDURE — 6370000000 HC RX 637 (ALT 250 FOR IP): Performed by: INTERNAL MEDICINE

## 2024-04-06 PROCEDURE — 80048 BASIC METABOLIC PNL TOTAL CA: CPT

## 2024-04-06 PROCEDURE — 6370000000 HC RX 637 (ALT 250 FOR IP): Performed by: ORTHOPAEDIC SURGERY

## 2024-04-06 PROCEDURE — 82947 ASSAY GLUCOSE BLOOD QUANT: CPT

## 2024-04-06 PROCEDURE — 2580000003 HC RX 258: Performed by: ORTHOPAEDIC SURGERY

## 2024-04-06 PROCEDURE — 85014 HEMATOCRIT: CPT

## 2024-04-06 PROCEDURE — 85025 COMPLETE CBC W/AUTO DIFF WBC: CPT

## 2024-04-06 PROCEDURE — 85018 HEMOGLOBIN: CPT

## 2024-04-06 RX ADMIN — PRIMIDONE 50 MG: 50 TABLET ORAL at 09:03

## 2024-04-06 RX ADMIN — DOCUSATE SODIUM 100 MG: 100 CAPSULE, LIQUID FILLED ORAL at 09:22

## 2024-04-06 RX ADMIN — POLYETHYLENE GLYCOL 3350 17 G: 17 POWDER, FOR SOLUTION ORAL at 09:03

## 2024-04-06 RX ADMIN — SODIUM CHLORIDE, PRESERVATIVE FREE 10 ML: 5 INJECTION INTRAVENOUS at 09:04

## 2024-04-06 RX ADMIN — ACETAMINOPHEN 650 MG: 325 TABLET ORAL at 09:03

## 2024-04-06 RX ADMIN — RANOLAZINE 500 MG: 500 TABLET, EXTENDED RELEASE ORAL at 09:03

## 2024-04-06 RX ADMIN — CLOPIDOGREL BISULFATE 75 MG: 75 TABLET ORAL at 09:03

## 2024-04-06 RX ADMIN — ASPIRIN 81 MG: 81 TABLET, CHEWABLE ORAL at 09:03

## 2024-04-06 RX ADMIN — ATORVASTATIN CALCIUM 10 MG: 10 TABLET, FILM COATED ORAL at 09:02

## 2024-04-06 RX ADMIN — NYSTATIN 500000 UNITS: 100000 SUSPENSION ORAL at 09:02

## 2024-04-06 RX ADMIN — PANTOPRAZOLE SODIUM 40 MG: 40 TABLET, DELAYED RELEASE ORAL at 05:33

## 2024-04-06 RX ADMIN — VANCOMYCIN HYDROCHLORIDE 1000 MG: 1 INJECTION, POWDER, LYOPHILIZED, FOR SOLUTION INTRAVENOUS at 05:41

## 2024-04-06 RX ADMIN — SENNOSIDES 8.6 MG: 8.6 TABLET, FILM COATED ORAL at 09:03

## 2024-04-06 ASSESSMENT — PAIN DESCRIPTION - LOCATION
LOCATION: LEG

## 2024-04-06 ASSESSMENT — PAIN SCALES - GENERAL
PAINLEVEL_OUTOF10: 4
PAINLEVEL_OUTOF10: 5
PAINLEVEL_OUTOF10: 5

## 2024-04-06 NOTE — PROGRESS NOTES
[facility]   IN-PATIENT SERVICE   [facility]    Progress note            Date:   4/6/2024  Patient name:  Komal Crawford  Date of admission:  4/2/2024 12:07 PM  MRN:   [id number]  Account:  [id number]  YOB: 1940  PCP:    Sabiha Bedolla MD  Room:   2073/2073-  Code Status:    Full Code    Chief Complaint:     Medical management    History Obtained From:     patient    History of Present Illness:     The patient is a 83 y.o.  Non- / non  female who presents with No chief complaint on file.   and she is admitted to the hospital for the management of left knee incision and drainage, repair of Quadriceps tendon, wound lavage and closure.  Internal medicine service consulted for medical management.    83-year-old female with history of neurogenic bladder, was performing straight in and out caths at home however more recently has a Winters inserted, chronic ITP, history of CVA, diabetes mellitus type 2, essential tremor, hyperlipidemia, hypertension was admitted to the hospital for left knee incision and drainage.    When seen today the patient is somnolent however awakens to verbal stimulus,  at bedside who reports that the patient does get this way after receiving anesthesia.  The patient denies nausea vomiting.  Nursing staff report concerns for decreased urine output since the patient arrived from the procedure yesterday.  She did not receive contrast, no NSAIDs were administered per my assessment on the chart.  Unable to tell if had episodes of hypotension in the OR.    Denies having fevers chills.    4/4/2024  Patient reports numbness in the right thumb, this was acute in onset today morning.  Denies having pain.  She had a PICC line replaced yesterday.    Past Medical History:     Past Medical History:   Diagnosis Date    Acquired absence of both cervix and uterus     Acquired absence of other organs     Age-related cognitive decline     Age-related  incontinence     frequent UTI s/p infection, surgical dilatation lead to incontinence    UTI (urinary tract infection)     Vitamin D deficiency, unspecified     Weakness     Wears dentures     full on top, partial on bottom    Wears glasses         Past Surgical History:     Past Surgical History:   Procedure Laterality Date    APPENDECTOMY  1962    BLADDER SURGERY      bladder stimulator    BLADDER SUSPENSION  2002    multiple    CARDIAC CATHETERIZATION  2008    no stents    CARDIOVASCULAR STRESS TEST  12/2014    CATARACT REMOVAL WITH IMPLANT Left 11/07/2017    [facility]    CATARACT REMOVAL WITH IMPLANT Right 11/28/2017    [facility]    COLON SURGERY      colostomy reversal    COLONOSCOPY  04/07/2016    tubular adenoma x3; internal hemorrhoids    COLONOSCOPY N/A 11/06/2019    COLONOSCOPY DIAGNOSTIC performed by Eli Marinelli MD at [facility] END[facility]    COLONOSCOPY  11/06/2019    COLOSTOMY  1986    bowel obstruction/ rupture from diverticular disease, reversal 3 mos later    CORONARY ANGIOPLASTY WITH STENT PLACEMENT  08/04/2022    CYSTOSCOPY  09/08/2017    W/ 200IU Botox     FRACTURE SURGERY Right 2011    hip    FRACTURE SURGERY Left 2001    WRIST    FRACTURE SURGERY Left 2013    ankle    HERNIA REPAIR      HIP SURGERY Right 11/6/2023    HIP HARDWARE REMOVAL - WITH DEEP HARDWARE REMOVAL 7.3 NABILA SCREW   X3 performed by Jon Bonner MD at [facility] OR    HYSTERECTOMY (CERVIX STATUS UNKNOWN)  1969    JOINT REPLACEMENT Bilateral 2000    BILAT KNEES    KNEE SURGERY Left 3/8/2024    KNEE INCISION AND DRAINAGE WITH WOUND VAC APPLICATION LEFT performed by Damian Keyes MD at [facility] OR    KNEE SURGERY Left 4/2/2024    LEFT KNEE INCISION AND DRAINAGE, BACTISURE WOUND LAVAGE, AND WOUND CLOSURE WITH PREVENA APPLICATION performed by Damian Keyes MD at [facility] OR    LEG MUSCLE SURGERY Left 4/2/2024    REPAIR OF QUADRACEPTS TENDON, performed by Damian Keyes MD at [facility] OR    LUMBAR FUSION  2017    L2/L3     to Sabiha Christina MD    Please note that this chart was generated using voice recognition Dragon dictation software.  Although every effort was made to ensure the accuracy of this automated transcription, some errors in transcription may have occurred.

## 2024-04-06 NOTE — CARE COORDINATION
IMM letter provided to patient.  Patient offered four hours to make informed decision regarding appeal process; patient agreeable to discharge.   Electronically signed by CARY Waddell on 4/6/2024 at 11:54 AM

## 2024-04-06 NOTE — PROGRESS NOTES
Patient received one unit of blood today; lab had to recollect multiple times and still unable. Writer sent perfect serve to [facility] for PICC line draw; new orders received; lab called to draw.

## 2024-04-06 NOTE — CARE COORDINATION
Writer is following for potential discharge to [facility]. Per Arnold, pt can return when medically ready.  Electronically signed by CARY Waddell on 4/6/2024 at 8:59 AM    Writer placed call to bedside RN Klarissa regarding discharge order. Klarissa has to verify if pt needs labs prior to discharge.  Electronically signed by CARY Waddell on 4/6/2024 at 10:19 AM    Pt is okay to discharge, transportation scheduled. Writer met with pt to discuss discharge plans and confirm transportation time. Pt became tearful regarding return to facility stating she has concerns with how much she is being bathed at the facility. Pt confirms there is no physical, financial, or emotional abuse at facility, she is just concerned with the facility \"just wiping her down\" for a bath.    Writer placed call to Danika with [facility] to voice pt concerns with bathing. Danika will place message to administration to voice concerns for this pt.  Electronically signed by CARY Waddell on 4/6/2024 at 11:58 AM

## 2024-04-06 NOTE — PROGRESS NOTES
Dr. Lozano notified that the patient did not have any labs ordered this morning, BMP and CBC ordered daily.

## 2024-04-06 NOTE — PROGRESS NOTES
Writer (RN) attempted to call [facility] 2 times. Left on hold 2 times for 30 minutes, unable to leave a VM but wrote unit number on pts paperwork for them to call back. Report given to transport team at bedside. Informed them as well that I was unable to contact Irvin Martínez and they stated that they would let them know to call me if they have questions. Transport team unable to take wheelchair in ambulette with pt.  notified and will reach out to Irvin martínez to come pick it up for her. Sticker placed on wheel chair and placed at nurses station.

## 2024-04-06 NOTE — PROGRESS NOTES
Vancomycin Dosing by Pharmacy - Daily Note   Vancomycin Therapy Day:  3  Indication: bone/joint    Allergies:  Iodides, Iodinated contrast media, Povidone-iodine, Sulfa antibiotics, Betadine [povidone iodine], Cephalexin, Cephalexin, Cozaar [losartan], Cymbalta [duloxetine hcl], Demerol, Doxycycline, Duloxetine, Meperidine, Morphine, Penicillins, Zithromax [azithromycin], Ciprofloxacin, Clindamycin/lincomycin, Etodolac, Fesoterodine, Fluorescein, Iodine, Lisinopril, Penicillin g, Toviaz [fesoterodine fumarate er], Clonazepam, and Metformin and related   Actual Weight:    Wt Readings from Last 1 Encounters:   04/02/24 75.3 kg (166 lb)       Labs/Ancillary Data  Estimated Creatinine Clearance: 100 mL/min (A) (based on SCr of 0.4 mg/dL (L)).  Recent Labs     04/04/24  0553 04/05/24  0644 04/06/24  1027   CREATININE 0.5 0.6 0.4*   BUN 11 12 11   WBC 9.2 7.7 8.0     Procalcitonin   Date Value Ref Range Status   03/06/2024 0.13 (H) <0.09 ng/mL Final     Comment:           Suspected Sepsis:  <0.50 ng/mL     Low likelihood of sepsis.  0.50-2.00 ng/mL     Increased likelihood of sepsis. Antibiotics encouraged.  >2.00 ng/mL     High risk of sepsis/shock. Antibiotics strongly encouraged.        Suspected Lower Resp Tract Infections:  <0.24 ng/mL     Low likelihood of bacterial infection.  >0.24 ng/mL     Increased likelihood of bacterial infection. Antibiotics encouraged.        With successful antibiotic therapy, PCT levels should decrease rapidly. (Half-life of 24 to   36 hours.)        Procalcitonin values from samples collected within the first 6 hours of systemic infection   may still be low. Retesting may be indicated.  Values from day 1 and day 4 can be entered into the Change in Procalcitonin Calculator   ([email or web address]) to determine the patient's Mortality Risk Prognosis        In healthy neonates, plasma Procalcitonin (PCT) concentrations increase gradually after   birth, reaching peak values at about  2009.    Loading dose: N/A  Regimen: 750 mg IV every 12 hours.  Start time: 2000 on 04/06/2024  Exposure target: AUC24 (range)400-600 mg/L.hr   AUC24,ss: 481 mg/L.hr  Probability of AUC24 > 400: 92 %  Ctrough,ss: 14.3 mg/L  Probability of Ctrough,ss > 20: 2 %  Probability of nephrotoxicity (Lodise HUYEN 2009): 9 %    Thank you for the consult.  Pharmacy will continue to follow.     NEHEMIAS Fagan.Ph.  4/6/2024  1:04 PM

## 2024-04-06 NOTE — PROGRESS NOTES
No events O/N. Pain appropriately controlled.     BP (!) 163/68   Pulse 78   Temp 96.8 °F (36 °C) (Oral)   Resp 22   Ht 1.473 m (4' 9.99\")   Wt 75.3 kg (166 lb)   SpO2 100%   BMI 34.70 kg/m²    Left knee wound vac dressing is intact and working appropriately. Moderate knee swelling present. 1+ pedal pulses. Sensation is grossly intact to light touch diffusely. Able to DF/PF her toes and foot.     Impression and plan: 83 year old lady sp left TKA I and D with wound closure POD 4  - Pain control  - Acute postop anemia. Transfused 1 U pRBC yesterday. AM labs pending  - Continue to mobilize with PT  - DC planning

## 2024-04-06 NOTE — PROGRESS NOTES
[facility]  Progress Note      NAME:  Komal Crawford  MEDICAL RECORD NUMBER:  [id number]  AGE: 83 y.o.   GENDER: female  : 1940  TODAY'S DATE:  2024    Pt switched from VAC Ulta to Prevena Plus. Suction applied and maintained at -125mmhg and functioning properly. Pt educated on use of the prevena plus and instructions placed with belongings to be taken to SNF.     Anu Henderson, BSN, RN, CWOCN, [facility]  Wound, Ostomy, and Continence Nursing  [phone]

## 2024-04-06 NOTE — PLAN OF CARE
Problem: Discharge Planning  Goal: Discharge to home or other facility with appropriate resources  4/6/2024 0437 by Sandhya Bennett RN  Outcome: Progressing     Problem: Chronic Conditions and Co-morbidities  Goal: Patient's chronic conditions and co-morbidity symptoms are monitored and maintained or improved  4/6/2024 0437 by Sandhya Bennett RN  Outcome: Progressing     Problem: Pain  Goal: Verbalizes/displays adequate comfort level or baseline comfort level  4/6/2024 0437 by Sandhya Bennett RN  Outcome: Progressing     Problem: Skin/Tissue Integrity  Goal: Absence of new skin breakdown  Description: 1.  Monitor for areas of redness and/or skin breakdown  2.  Assess vascular access sites hourly  3.  Every 4-6 hours minimum:  Change oxygen saturation probe site  4.  Every 4-6 hours:  If on nasal continuous positive airway pressure, respiratory therapy assess nares and determine need for appliance change or resting period.  4/6/2024 0437 by Sandhya Bennett RN  Outcome: Progressing     Problem: Safety - Adult  Goal: Free from fall injury  4/6/2024 0437 by Sandhya Bennett RN  Outcome: Progressing  Flowsheets (Taken 4/6/2024 0203)  Free From Fall Injury:   Instruct family/caregiver on patient safety   Based on caregiver fall risk screen, instruct family/caregiver to ask for assistance with transferring infant if caregiver noted to have fall risk factors     Problem: ABCDS Injury Assessment  Goal: Absence of physical injury  4/6/2024 0437 by Sandhya Bennett RN  Outcome: Progressing

## 2024-04-06 NOTE — CARE COORDINATION
DISCHARGE PLANNING NOTE:    Notified by the patient's nurse, LETY Cyr, that the patient's wheelchair was left behind due to stretcher transportation.     This writer contacted Danika from [facility] and she will get someone over to pick it up.     Danika was also updated that bedside nurse has tried several times to leave report, however no one answers. Danika is going to call the facility and have them call the unit.    Electronically signed by Nisreen Wells RN on 4/6/2024 at 2:09 PM

## 2024-04-06 NOTE — CARE COORDINATION
Expiration Resolved Resolved By    MRSA 03/07/24 03/09/24 03/08/24 Culture, Anaerobic and Aerobic        MDRO (multi-drug resistant organism)  05/10/21 05/10/21 Maribel Sommer RN        E Coli urine 1/2023                       Nurse Assessment:  Last Vital Signs: BP (!) 105/50   Pulse 77   Temp 97.9 °F (36.6 °C) (Axillary)   Resp 16   Ht 1.473 m (4' 9.99\")   Wt 75.3 kg (166 lb)   SpO2 90%   BMI 34.70 kg/m²     Last documented pain score (0-10 scale): Pain Level: 0  Last Weight:   Wt Readings from Last 1 Encounters:   04/02/24 75.3 kg (166 lb)     Mental Status:  oriented and alert    IV Access:  - None    Nursing Mobility/ADLs:  Walking   Assisted  Transfer  Assisted  Bathing  Assisted  Dressing  Assisted  Toileting  Assisted  Feeding  Assisted  Med Admin  Assisted  Med Delivery   whole    Wound Care Documentation and Therapy:  Negative Pressure Wound Therapy Knee Left (Active)   Wound Type Surgical 04/03/24 0849   Cycle Continuous 04/03/24 0849   Target Pressure (mmHg) 125 04/03/24 0849   Dressing Status Clean, dry & intact 04/02/24 2225   Drainage Amount None 04/02/24 2225   Wound Assessment Other (Comment) 04/02/24 2225   Odor None 04/02/24 2225   Number of days: 0       Wound 03/22/24 Knee Left;Anterior #1 (Active)   Wound Image    03/29/24 0901   Wound Etiology Non-Healing Surgical 03/29/24 0901   Dressing Status Clean;Dry;Intact 04/03/24 0713   Wound Cleansed Vashe 03/29/24 0901   Dressing/Treatment Ace wrap 04/02/24 1900   Wound Length (cm) 18.4 cm 03/29/24 0901   Wound Width (cm) 5.5 cm 03/29/24 0901   Wound Depth (cm) 2.5 cm 03/29/24 0901   Wound Surface Area (cm^2) 101.2 cm^2 03/29/24 0901   Change in Wound Size % (l*w) -25.87 03/29/24 0901   Wound Volume (cm^3) 253 cm^3 03/29/24 0901   Wound Healing % -26 03/29/24 0901   Post-Procedure Length (cm) 18.4 cm 03/29/24 0901   Post-Procedure Width (cm) 5.5 cm 03/29/24 0901   Post-Procedure Depth (cm) 2.5 cm 03/29/24 0901   Post-Procedure Surface  Area (cm^2) 101.2 cm^2 03/29/24 0901   Post-Procedure Volume (cm^3) 253 cm^3 03/29/24 0901   Undermining Starts ___ O'Clock 0600 03/29/24 0901   Undermining Ends___ O'Clock 0500 03/29/24 0901   Undermining Maxium Distance (cm) 2.1@0300 03/29/24 0901   Wound Assessment Exposed hardware;Devitalized tissue;Exposed structure fascia;Exposed structure muscle;Pink/red;Slough 03/29/24 0901   Drainage Amount Large (50-75% saturated) 03/29/24 0901   Drainage Description Serosanguinous 03/29/24 0901   Odor None 03/29/24 0901   Carolina-wound Assessment Blanchable erythema 03/29/24 0901   Margins Defined edges 03/29/24 0901   Wound Thickness Description not for Pressure Injury Full thickness 03/29/24 0901   Number of days: 12       Incision 04/02/24 Knee Left;Anterior (Active)   Dressing Status Clean;Dry;Intact 04/03/24 0849   Incision Cleansed Not Cleansed 04/02/24 2225   Dressing/Treatment Gauze dressing/dressing sponge;Ace wrap;Negative pressure wound therapy 04/02/24 2225   Closure Sutures 04/02/24 2225   Margins Approximated 04/02/24 1639   Incision Assessment Other (Comment) 04/02/24 2225   Drainage Amount None (dry) 04/02/24 2225   Odor None 04/02/24 2225   Carol[place]-incision Assessment Intact 04/02/24 2225   Number of days: 0        Elimination:  Continence:   Bowel: Yes  Bladder: Yes  Urinary Catheter: None   Colostomy/Ileostomy/Ileal Conduit: No       Date of Last BM:     Intake/Output Summary (Last 24 hours) at 4/3/2024 1347  Last data filed at 4/3/2024 1313  Gross per 24 hour   Intake 220 ml   Output 775 ml   Net -555 ml     I/O last 3 completed shifts:  In: -   Out: 575 [Urine:575]    Safety Concerns:     At Risk for Falls    Impairments/Disabilities:      None    Nutrition Therapy:  Current Nutrition Therapy:   - Oral Diet:  Carb Control 4 carbs/meal (1800kcals/day) and Dysphagia - Soft and Bite Sized    Routes of Feeding: Oral  Liquids: No Restrictions  Daily Fluid Restriction: no  Last Modified Barium Swallow with Video  SIGNATURE:  Electronically signed by Everett Shea MD on 4/4/24 at 9:42 AM EDT

## 2024-04-08 ENCOUNTER — CARE COORDINATION (OUTPATIENT)
Dept: CARE COORDINATION | Age: 84
End: 2024-04-08

## 2024-04-08 ENCOUNTER — HOSPITAL ENCOUNTER (OUTPATIENT)
Age: 84
Setting detail: SPECIMEN
Discharge: HOME | End: 2024-04-08

## 2024-04-08 LAB
ABO/RH: NORMAL
ANION GAP SERPL CALCULATED.3IONS-SCNC: 10 MMOL/L (ref 9–16)
ANTIBODY SCREEN: NEGATIVE
ARM BAND NUMBER: NORMAL
BASOPHILS # BLD: 0.08 K/UL (ref 0–0.2)
BASOPHILS NFR BLD: 1 % (ref 0–2)
BLOOD BANK BLOOD PRODUCT EXPIRATION DATE: NORMAL
BLOOD BANK DISPENSE STATUS: NORMAL
BLOOD BANK ISBT PRODUCT BLOOD TYPE: 5100
BLOOD BANK PRODUCT CODE: NORMAL
BLOOD BANK SAMPLE EXPIRATION: NORMAL
BLOOD BANK UNIT TYPE AND RH: NORMAL
BPU ID: NORMAL
BUN SERPL-MCNC: 8 MG/DL (ref 8–23)
CALCIUM SERPL-MCNC: 8.5 MG/DL (ref 8.6–10.4)
CHLORIDE SERPL-SCNC: 103 MMOL/L (ref 98–107)
CO2 SERPL-SCNC: 23 MMOL/L (ref 20–31)
COMPONENT: NORMAL
CREAT SERPL-MCNC: 0.5 MG/DL (ref 0.5–0.9)
CROSSMATCH RESULT: NORMAL
EOSINOPHIL # BLD: 0.52 K/UL (ref 0–0.44)
EOSINOPHILS RELATIVE PERCENT: 7 % (ref 1–4)
ERYTHROCYTE [DISTWIDTH] IN BLOOD BY AUTOMATED COUNT: 16 % (ref 11.8–14.4)
GFR SERPL CREATININE-BSD FRML MDRD: >90 ML/MIN/1.73M2
GLUCOSE SERPL-MCNC: 90 MG/DL (ref 74–99)
HCT VFR BLD AUTO: 27.9 % (ref 36.3–47.1)
HGB BLD-MCNC: 8.8 G/DL (ref 11.9–15.1)
IMM GRANULOCYTES # BLD AUTO: 0.06 K/UL (ref 0–0.3)
IMM GRANULOCYTES NFR BLD: 1 %
LYMPHOCYTES NFR BLD: 1.46 K/UL (ref 1.1–3.7)
LYMPHOCYTES RELATIVE PERCENT: 19 % (ref 24–43)
MCH RBC QN AUTO: 27.5 PG (ref 25.2–33.5)
MCHC RBC AUTO-ENTMCNC: 31.5 G/DL (ref 28.4–34.8)
MCV RBC AUTO: 87.2 FL (ref 82.6–102.9)
MONOCYTES NFR BLD: 0.57 K/UL (ref 0.1–1.2)
MONOCYTES NFR BLD: 8 % (ref 3–12)
NEUTROPHILS NFR BLD: 65 % (ref 36–65)
NEUTS SEG NFR BLD: 4.9 K/UL (ref 1.5–8.1)
NRBC BLD-RTO: 0 PER 100 WBC
PLATELET # BLD AUTO: ABNORMAL K/UL (ref 138–453)
PLATELET, FLUORESCENCE: 299 K/UL (ref 138–453)
PLATELETS.RETICULATED NFR BLD AUTO: 13.4 % (ref 1.1–10.3)
POTASSIUM SERPL-SCNC: 3.5 MMOL/L (ref 3.7–5.3)
RBC # BLD AUTO: 3.2 M/UL (ref 3.95–5.11)
RBC # BLD: ABNORMAL 10*6/UL
SODIUM SERPL-SCNC: 136 MMOL/L (ref 136–145)
TRANSFUSION STATUS: NORMAL
UNIT DIVISION: 0
UNIT ISSUE DATE/TIME: NORMAL
WBC OTHER # BLD: 7.6 K/UL (ref 3.5–11.3)

## 2024-04-08 PROCEDURE — 85025 COMPLETE CBC W/AUTO DIFF WBC: CPT

## 2024-04-08 PROCEDURE — 80048 BASIC METABOLIC PNL TOTAL CA: CPT

## 2024-04-08 PROCEDURE — P9603 ONE-WAY ALLOW PRORATED MILES: HCPCS

## 2024-04-08 PROCEDURE — 85055 RETICULATED PLATELET ASSAY: CPT

## 2024-04-08 NOTE — DISCHARGE SUMMARY
Discharge Summary     Patient ID:  Komal Crawford  [id number]  83 y.o.  1940    Admit date: 4/2/2024    Discharge date and time: 4/6/2024  2:13 PM     Admitting Physician: Damian Keyes MD     Admission Diagnoses: Open knee wound, left, initial encounter [S81.002A]  Infection of total left knee replacement, sequela [T84.54XS]    Discharge Diagnoses: Same    Admission Condition: fair    Discharged Condition: fair    Indication for Admission: Status post irrigation debridement infected left total knee with quadriceps tendon repair, IV antibiotics    Hospital Course: Patient required unit of packed red blood cells for hemoglobin of 6.7    Consults: ID and internal medicine    Treatments: surgery and PT    Disposition: SNF    Patient Instructions:   Discharge Medication List as of 4/6/2024  1:08 PM        CONTINUE these medications which have CHANGED    Details   vancomycin (VANCOCIN) infusion Infuse 1,000 mg intravenously every 24 hours for 18 days Compound per protocol., Disp-18 g, R-0Normal           CONTINUE these medications which have NOT CHANGED    Details   diphenhydrAMINE (BENADRYL) 25 MG capsule Take 1 capsule by mouth every 6 hours as needed for Itching or AllergiesHistorical Med      hydrALAZINE (APRESOLINE) 25 MG tablet Take 1 tablet by mouth daily For SBP >160Historical Med      Multiple Vitamins-Minerals (THERAPEUTIC MULTIVITAMIN-MINERALS) tablet Take 1 tablet by mouth dailyHistorical Med      Sodium Chloride Flush (NORMAL SALINE FLUSH IV) Infuse 20 mLs intravenously 2 times daily Use 20ml IV every shift for patency, flush after IV meds.Historical Med      nystatin (MYCOSTATIN) 347756 UNIT/ML suspension Take 5 mLs by mouth 4 times daily, Oral, 4 TIMES DAILY, Historical Med      omeprazole (PRILOSEC) 20 MG delayed release capsule Take 1 capsule by mouth dailyHistorical Med      senna (SENOKOT) 8.6 MG tablet Take 1 tablet by mouth 2 times daily For constipationHistorical Med      aspirin 81 MG chewable

## 2024-04-09 ENCOUNTER — OUTSIDE SERVICES (OUTPATIENT)
Dept: INFECTIOUS DISEASES | Age: 84
End: 2024-04-09
Payer: MEDICARE

## 2024-04-09 DIAGNOSIS — B37.81 THRUSH OF MOUTH AND ESOPHAGUS (HCC): ICD-10-CM

## 2024-04-09 DIAGNOSIS — T81.49XA METHICILLIN-RESISTANT STAPHYLOCOCCUS AUREUS INFECTION OF POSTOPERATIVE WOUND: Primary | ICD-10-CM

## 2024-04-09 DIAGNOSIS — Z88.1 ALLERGY TO MULTIPLE ANTIBIOTICS: ICD-10-CM

## 2024-04-09 DIAGNOSIS — B95.62 METHICILLIN-RESISTANT STAPHYLOCOCCUS AUREUS INFECTION OF POSTOPERATIVE WOUND: Primary | ICD-10-CM

## 2024-04-09 DIAGNOSIS — D72.10 EOSINOPHILIA, UNSPECIFIED TYPE: ICD-10-CM

## 2024-04-09 DIAGNOSIS — B37.0 THRUSH OF MOUTH AND ESOPHAGUS (HCC): ICD-10-CM

## 2024-04-09 PROCEDURE — 99309 SBSQ NF CARE MODERATE MDM 30: CPT | Performed by: NURSE PRACTITIONER

## 2024-04-10 ENCOUNTER — OFFICE VISIT (OUTPATIENT)
Dept: ORTHOPEDIC SURGERY | Age: 84
End: 2024-04-10

## 2024-04-10 ENCOUNTER — CARE COORDINATION (OUTPATIENT)
Dept: CARE COORDINATION | Age: 84
End: 2024-04-10

## 2024-04-10 ENCOUNTER — HOSPITAL ENCOUNTER (OUTPATIENT)
Age: 84
Setting detail: SPECIMEN
Discharge: HOME | End: 2024-04-10

## 2024-04-10 VITALS
HEART RATE: 64 BPM | OXYGEN SATURATION: 96 % | RESPIRATION RATE: 17 BRPM | DIASTOLIC BLOOD PRESSURE: 88 MMHG | SYSTOLIC BLOOD PRESSURE: 161 MMHG | TEMPERATURE: 97.9 F

## 2024-04-10 DIAGNOSIS — Z96.652 HISTORY OF TOTAL KNEE ARTHROPLASTY, LEFT: Primary | ICD-10-CM

## 2024-04-10 LAB
ANION GAP SERPL CALCULATED.3IONS-SCNC: 9 MMOL/L (ref 9–16)
BUN SERPL-MCNC: 13 MG/DL (ref 8–23)
CALCIUM SERPL-MCNC: 8.3 MG/DL (ref 8.6–10.4)
CHLORIDE SERPL-SCNC: 106 MMOL/L (ref 98–107)
CO2 SERPL-SCNC: 23 MMOL/L (ref 20–31)
CREAT SERPL-MCNC: 0.6 MG/DL (ref 0.5–0.9)
ERYTHROCYTE [DISTWIDTH] IN BLOOD BY AUTOMATED COUNT: 16.4 % (ref 11.8–14.4)
GFR SERPL CREATININE-BSD FRML MDRD: >90 ML/MIN/1.73M2
GLUCOSE SERPL-MCNC: 87 MG/DL (ref 74–99)
HCT VFR BLD AUTO: 25.4 % (ref 36.3–47.1)
HGB BLD-MCNC: 8 G/DL (ref 11.9–15.1)
MCH RBC QN AUTO: 27.9 PG (ref 25.2–33.5)
MCHC RBC AUTO-ENTMCNC: 31.5 G/DL (ref 28.4–34.8)
MCV RBC AUTO: 88.5 FL (ref 82.6–102.9)
NRBC BLD-RTO: 0 PER 100 WBC
PLATELET # BLD AUTO: ABNORMAL K/UL (ref 138–453)
PLATELET, FLUORESCENCE: 297 K/UL (ref 138–453)
PLATELETS.RETICULATED NFR BLD AUTO: 12.2 % (ref 1.1–10.3)
POTASSIUM SERPL-SCNC: 3.8 MMOL/L (ref 3.7–5.3)
RBC # BLD AUTO: 2.87 M/UL (ref 3.95–5.11)
SODIUM SERPL-SCNC: 138 MMOL/L (ref 136–145)
VANCOMYCIN TROUGH SERPL-MCNC: 10.9 UG/ML (ref 10–20)
WBC OTHER # BLD: 8 K/UL (ref 3.5–11.3)

## 2024-04-10 PROCEDURE — P9603 ONE-WAY ALLOW PRORATED MILES: HCPCS

## 2024-04-10 PROCEDURE — 99024 POSTOP FOLLOW-UP VISIT: CPT | Performed by: ORTHOPAEDIC SURGERY

## 2024-04-10 PROCEDURE — 80048 BASIC METABOLIC PNL TOTAL CA: CPT

## 2024-04-10 PROCEDURE — 85027 COMPLETE CBC AUTOMATED: CPT

## 2024-04-10 PROCEDURE — 80202 ASSAY OF VANCOMYCIN: CPT

## 2024-04-10 PROCEDURE — 85055 RETICULATED PLATELET ASSAY: CPT

## 2024-04-10 ASSESSMENT — ENCOUNTER SYMPTOMS: COUGH: 1

## 2024-04-10 NOTE — PROGRESS NOTES
Komal returns today she status post revision left total knee for periprosthetic fracture with a hinged prosthesis.  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  Unfortunately the patient again while in wound care was noted that with the VAC sponge removed that that she had ruptured her quadriceps repair and the prosthesis was visible she was taken back to surgery where she had a repeat irrigation debridement quadriceps tendon repair and we were able to accomplish wound closure utilizing combination of all monofilament sutures including Prolene both in the wound as well as a large retention type suture.  This was then covered with a Prevena which was initially hooked up to the wound VAC in the hospital and then switch over to the 14-day canister.  She comes today from the nursing facility and her extension hinged brace which was locked in full extension.    Patient's leg overall looks pretty good.  The area is not significant mount of swelling and there is no redness.  We elected not to remove the Prevena as he was still having his slight amount of drainage and did not have the capability of changing the Justyna over to a new one today.  This also being that she is only 8 days out from her procedure.    Patient to return back here in the next several days at which time feel much more comfortable removing some of the sutures if not all depending on the wound status.  We will attempt to have this a new Prevena applied if that is necessary otherwise routine bandage the patient is to remain into the hinged knee brace at all times to protect her quadriceps repair

## 2024-04-10 NOTE — CARE COORDINATION
Care Transitions Post-Acute Facility Update Call    4/10/2024    Patient: Komal Crawford Patient : 1940   MRN: [id number]  Reason for Admission: Status post irrigation debridement infected left total knee with quadriceps tendon repair  Discharge Date: 24 RARS: Readmission Risk Score: 32.3    Care Transitions Post Acute Facility Update    Care Transitions Interventions    Physical Therapy: Completed          Post Acute Facility Update   Post Acute Facility: [facility]          Nursing  BP: 140/80 mmHg  4/10/2024 01:05 Temp:97.9 °F  4/10/2024 01:05 Pulse:64 bpm  4/10/2024 01:05 Weight:162.5 Lbs  2024 14:01   Resp:18 Breaths/min  4/10/2024 01:05 BS:93 mg/dL  4/10/2024 05:51 O2:97 %  4/10/2024 01:05 Pain:0  4/10/2024 08:56      Prescribed diet: .    CCD diet, Regular texture, Thin consistency increase protein to diet for Diet       Nutrition intake assessment: .   Skilled Medication therapies: PT / OT    Medical equipment being used: BRACE POST OP   Disease specific clinical considerations: .   Reported Nursing Updates: .    Hoping today after f/u with surgeon will have new orders for her to shower.    Rehab/Functional   Prior Level of Functioning: .   Cognitive function assessment: .  Activities of Daily Living ADL Feeding: Setup   Grooming: Setup UE Bathing: Stand by assistance   LE Bathing: Maximum assistance   UE Dressing: Stand by assistance   LE Dressing: Dependent/Total Toileting: Dependent/Total Toileting   Skilled Clinical Factors: brief/reeves catheter   Additional Comments: pt completes UB bathing/dressing and grooming tasks sitting EOB. Above levels based on pt report, observation, and clinical reasoning unless otherwise noted. Pt has hinged knee brace (locked in extension) and wound vac on L knee. Pt also requires 2\" lift on R shoe 2* leg length discrepancy (separate piece that is placed onto the bottom of the shoe.                How far (in feet) is the patient ambulating?: 0   Does patient

## 2024-04-10 NOTE — PROGRESS NOTES
Medical Decision Making:   I have independently reviewed/ordered the following labs:    CBC with Differential:   Recent Labs     04/08/24  0845 04/10/24  0725   WBC 7.6 8.0   HGB 8.8* 8.0*   HCT 27.9* 25.4*   PLT See Reflexed IPF Result See Reflexed IPF Result   LYMPHOPCT 19*  --    MONOPCT 8  --        BMP:  Recent Labs     04/08/24  0845 04/10/24  0725    138   K 3.5* 3.8    106   CO2 23 23   BUN 8 13   CREATININE 0.5 0.6       Hepatic Function Panel:   No results for input(s): \"PROT\", \"LABALBU\", \"BILIDIR\", \"IBILI\", \"BILITOT\", \"ALKPHOS\", \"ALT\", \"AST\" in the last 72 hours.    No results for input(s): \"RPR\" in the last 72 hours.  No results for input(s): \"HIV\" in the last 72 hours.  No results for input(s): \"BC\" in the last 72 hours.  Lab Results   Component Value Date/Time    CREATININE 0.6 04/10/2024 07:25 AM    GLUCOSE 87 04/10/2024 07:25 AM    GLUCOSE 168 06/01/2023 08:59 AM       Detailed results:        Thank you for allowing us to participate in the care of this patient.Please call with questions.    This note is created with the assistance of a speech recognition program.  While intending to generate adocument that actually reflects the content of the visit, the document can still have some errors including those of syntax and sound a like substitutions which may escape proof reading.  It such instances, actual meaningcan be extrapolated by contextual diversion.    Cecelia Gutierrez, APRN - CNP  Office: [phone]  Cell: [phone]

## 2024-04-11 ENCOUNTER — HOSPITAL ENCOUNTER (OUTPATIENT)
Age: 84
Setting detail: SPECIMEN
Discharge: HOME | End: 2024-04-11

## 2024-04-11 ENCOUNTER — OUTSIDE SERVICES (OUTPATIENT)
Dept: INFECTIOUS DISEASES | Age: 84
End: 2024-04-11

## 2024-04-11 VITALS
SYSTOLIC BLOOD PRESSURE: 154 MMHG | OXYGEN SATURATION: 96 % | HEART RATE: 87 BPM | RESPIRATION RATE: 18 BRPM | DIASTOLIC BLOOD PRESSURE: 73 MMHG | TEMPERATURE: 97.9 F

## 2024-04-11 DIAGNOSIS — B37.0 THRUSH OF MOUTH AND ESOPHAGUS (HCC): ICD-10-CM

## 2024-04-11 DIAGNOSIS — Z88.1 ALLERGY TO MULTIPLE ANTIBIOTICS: ICD-10-CM

## 2024-04-11 DIAGNOSIS — T81.49XA METHICILLIN-RESISTANT STAPHYLOCOCCUS AUREUS INFECTION OF POSTOPERATIVE WOUND: Primary | ICD-10-CM

## 2024-04-11 DIAGNOSIS — D72.10 EOSINOPHILIA, UNSPECIFIED TYPE: ICD-10-CM

## 2024-04-11 DIAGNOSIS — B37.81 THRUSH OF MOUTH AND ESOPHAGUS (HCC): ICD-10-CM

## 2024-04-11 DIAGNOSIS — B95.62 METHICILLIN-RESISTANT STAPHYLOCOCCUS AUREUS INFECTION OF POSTOPERATIVE WOUND: Primary | ICD-10-CM

## 2024-04-11 LAB
ANION GAP SERPL CALCULATED.3IONS-SCNC: 8 MMOL/L (ref 9–16)
BUN SERPL-MCNC: 11 MG/DL (ref 8–23)
CALCIUM SERPL-MCNC: 8.7 MG/DL (ref 8.6–10.4)
CHLORIDE SERPL-SCNC: 106 MMOL/L (ref 98–107)
CO2 SERPL-SCNC: 23 MMOL/L (ref 20–31)
CREAT SERPL-MCNC: 0.6 MG/DL (ref 0.5–0.9)
ERYTHROCYTE [DISTWIDTH] IN BLOOD BY AUTOMATED COUNT: 16.4 % (ref 11.8–14.4)
GFR SERPL CREATININE-BSD FRML MDRD: 88 ML/MIN/1.73M2
GLUCOSE SERPL-MCNC: 102 MG/DL (ref 74–99)
HCT VFR BLD AUTO: 28 % (ref 36.3–47.1)
HGB BLD-MCNC: 8.7 G/DL (ref 11.9–15.1)
MCH RBC QN AUTO: 27.4 PG (ref 25.2–33.5)
MCHC RBC AUTO-ENTMCNC: 31.1 G/DL (ref 28.4–34.8)
MCV RBC AUTO: 88.3 FL (ref 82.6–102.9)
NRBC BLD-RTO: 0 PER 100 WBC
PLATELET # BLD AUTO: ABNORMAL K/UL (ref 138–453)
PLATELET, FLUORESCENCE: 337 K/UL (ref 138–453)
PLATELETS.RETICULATED NFR BLD AUTO: 12.4 % (ref 1.1–10.3)
POTASSIUM SERPL-SCNC: 4.1 MMOL/L (ref 3.7–5.3)
RBC # BLD AUTO: 3.17 M/UL (ref 3.95–5.11)
SODIUM SERPL-SCNC: 137 MMOL/L (ref 136–145)
WBC OTHER # BLD: 10.7 K/UL (ref 3.5–11.3)

## 2024-04-11 PROCEDURE — 85027 COMPLETE CBC AUTOMATED: CPT

## 2024-04-11 PROCEDURE — 80048 BASIC METABOLIC PNL TOTAL CA: CPT

## 2024-04-11 PROCEDURE — P9603 ONE-WAY ALLOW PRORATED MILES: HCPCS

## 2024-04-11 PROCEDURE — 85055 RETICULATED PLATELET ASSAY: CPT

## 2024-04-11 ASSESSMENT — ENCOUNTER SYMPTOMS: COUGH: 0

## 2024-04-11 NOTE — PROGRESS NOTES
[facility] -   Infectious diseases evaluation  admission date 4/6/2024    reason for consultation:   Antibiotic Management    Impression :   Current:  Left  knee postoperative wound infection /cellulitis  3/8    S/p I & D with application of Vancomycin powder and wound vac.  Hardware was retained.  S/p I & D left knee wound, repair of quadriceps tendon, wound lavavge and closure 4/2/24.  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  Allergies to multiple antibiotics.  Chronic reeves catheter/changed 3/6/24.    Other:    Discussion / summary of stay / plan of care/ Recommendations:     HENCE:   Vancomycin IV, pharmacy to dose x 6 weeks through 4/24/24.  Nystatin swish and swallow QID while on antibiotics.  Follow CBC and renal function closely.  Wound care.  Supportive care.  Discussed with patient.    Infection Control Recommendations   [place] Precautions  Contact Isolation     Antimicrobial Stewardship Recommendations   Simplification of therapy  Targeted therapy    History of Present Illness:   Initial history:  Komal Crawford is a 83 y.o.-year-old female s/p left total knee arthroplasty revision on 1/23/24 who presented to [place] on 3/6/24 with left knee incision wound with swelling, warmth and pain with associated fever of 102.6.  Patient underwent I & D of the left knee, application of Vancomycin powder and wound vac placement on 3/8/24.  Intra-operative wound culture grew MRSA.  Treated with Vancomycin IV while at [place]. Discharged to [facility] on 3/12/24 with plan for Vancomycin IV through 4/24/24.    Interval changes 4/11/2024   Sitting up in wc.  Feeling good today.  Left knee with wound vac. Moderate swelling.  RUE PICC site clean.  Denies any fever, chills, n/v/d.  Cough has subsided.  CXR-Lungs clear.      Summary of relevant labs:  Labs:  WBC: 6.7-6.5-7.5-8.0-7.6-8.0-10.7  Eosinophils:

## 2024-04-12 ENCOUNTER — HOSPITAL ENCOUNTER (OUTPATIENT)
Dept: WOUND CARE | Age: 84
Discharge: HOME | End: 2024-04-12

## 2024-04-15 ENCOUNTER — HOSPITAL ENCOUNTER (OUTPATIENT)
Age: 84
Setting detail: SPECIMEN
Discharge: HOME | End: 2024-04-15

## 2024-04-15 LAB
ANION GAP SERPL CALCULATED.3IONS-SCNC: 11 MMOL/L (ref 9–16)
BASOPHILS # BLD: 0.08 K/UL (ref 0–0.2)
BASOPHILS NFR BLD: 1 % (ref 0–2)
BUN SERPL-MCNC: 8 MG/DL (ref 8–23)
CALCIUM SERPL-MCNC: 7.7 MG/DL (ref 8.6–10.4)
CHLORIDE SERPL-SCNC: 107 MMOL/L (ref 98–107)
CO2 SERPL-SCNC: 20 MMOL/L (ref 20–31)
CREAT SERPL-MCNC: 0.5 MG/DL (ref 0.5–0.9)
EOSINOPHIL # BLD: 0.69 K/UL (ref 0–0.44)
EOSINOPHILS RELATIVE PERCENT: 10 % (ref 1–4)
ERYTHROCYTE [DISTWIDTH] IN BLOOD BY AUTOMATED COUNT: 16.7 % (ref 11.8–14.4)
GFR SERPL CREATININE-BSD FRML MDRD: >90 ML/MIN/1.73M2
GLUCOSE SERPL-MCNC: 128 MG/DL (ref 74–99)
HCT VFR BLD AUTO: 25.2 % (ref 36.3–47.1)
HGB BLD-MCNC: 7.9 G/DL (ref 11.9–15.1)
IMM GRANULOCYTES # BLD AUTO: 0.04 K/UL (ref 0–0.3)
IMM GRANULOCYTES NFR BLD: 1 %
LYMPHOCYTES NFR BLD: 1.23 K/UL (ref 1.1–3.7)
LYMPHOCYTES RELATIVE PERCENT: 17 % (ref 24–43)
MCH RBC QN AUTO: 28 PG (ref 25.2–33.5)
MCHC RBC AUTO-ENTMCNC: 31.3 G/DL (ref 28.4–34.8)
MCV RBC AUTO: 89.4 FL (ref 82.6–102.9)
MONOCYTES NFR BLD: 0.63 K/UL (ref 0.1–1.2)
MONOCYTES NFR BLD: 9 % (ref 3–12)
NEUTROPHILS NFR BLD: 63 % (ref 36–65)
NEUTS SEG NFR BLD: 4.52 K/UL (ref 1.5–8.1)
NRBC BLD-RTO: 0 PER 100 WBC
PLATELET # BLD AUTO: 273 K/UL (ref 138–453)
PMV BLD AUTO: 13.1 FL (ref 8.1–13.5)
POTASSIUM SERPL-SCNC: 3.4 MMOL/L (ref 3.7–5.3)
RBC # BLD AUTO: 2.82 M/UL (ref 3.95–5.11)
RBC # BLD: ABNORMAL 10*6/UL
SODIUM SERPL-SCNC: 138 MMOL/L (ref 136–145)
VANCOMYCIN TROUGH SERPL-MCNC: 11.3 UG/ML (ref 10–20)
WBC OTHER # BLD: 7.2 K/UL (ref 3.5–11.3)

## 2024-04-15 PROCEDURE — 80202 ASSAY OF VANCOMYCIN: CPT

## 2024-04-15 PROCEDURE — 85025 COMPLETE CBC W/AUTO DIFF WBC: CPT

## 2024-04-15 PROCEDURE — P9603 ONE-WAY ALLOW PRORATED MILES: HCPCS

## 2024-04-15 PROCEDURE — 80048 BASIC METABOLIC PNL TOTAL CA: CPT

## 2024-04-17 ENCOUNTER — CARE COORDINATION (OUTPATIENT)
Dept: CARE COORDINATION | Age: 84
End: 2024-04-17

## 2024-04-17 ENCOUNTER — HOSPITAL ENCOUNTER (OUTPATIENT)
Age: 84
Setting detail: SPECIMEN
Discharge: HOME | End: 2024-04-17

## 2024-04-17 LAB
ANION GAP SERPL CALCULATED.3IONS-SCNC: 10 MMOL/L (ref 9–16)
BUN SERPL-MCNC: 10 MG/DL (ref 8–23)
CALCIUM SERPL-MCNC: 8.9 MG/DL (ref 8.6–10.4)
CHLORIDE SERPL-SCNC: 107 MMOL/L (ref 98–107)
CO2 SERPL-SCNC: 23 MMOL/L (ref 20–31)
CREAT SERPL-MCNC: 0.6 MG/DL (ref 0.5–0.9)
ERYTHROCYTE [DISTWIDTH] IN BLOOD BY AUTOMATED COUNT: 16.8 % (ref 11.8–14.4)
GFR SERPL CREATININE-BSD FRML MDRD: 89 ML/MIN/1.73M2
GLUCOSE SERPL-MCNC: 79 MG/DL (ref 74–99)
HCT VFR BLD AUTO: 28 % (ref 36.3–47.1)
HGB BLD-MCNC: 8.6 G/DL (ref 11.9–15.1)
MCH RBC QN AUTO: 27.7 PG (ref 25.2–33.5)
MCHC RBC AUTO-ENTMCNC: 30.7 G/DL (ref 28.4–34.8)
MCV RBC AUTO: 90.3 FL (ref 82.6–102.9)
NRBC BLD-RTO: 0 PER 100 WBC
PLATELET # BLD AUTO: ABNORMAL K/UL (ref 138–453)
PLATELET, FLUORESCENCE: 323 K/UL (ref 138–453)
PLATELETS.RETICULATED NFR BLD AUTO: 11.6 % (ref 1.1–10.3)
POTASSIUM SERPL-SCNC: 4.3 MMOL/L (ref 3.7–5.3)
RBC # BLD AUTO: 3.1 M/UL (ref 3.95–5.11)
SODIUM SERPL-SCNC: 140 MMOL/L (ref 136–145)
WBC OTHER # BLD: 7.8 K/UL (ref 3.5–11.3)

## 2024-04-17 PROCEDURE — P9603 ONE-WAY ALLOW PRORATED MILES: HCPCS

## 2024-04-17 PROCEDURE — 85027 COMPLETE CBC AUTOMATED: CPT

## 2024-04-17 PROCEDURE — 85055 RETICULATED PLATELET ASSAY: CPT

## 2024-04-17 PROCEDURE — 80048 BASIC METABOLIC PNL TOTAL CA: CPT

## 2024-04-17 PROCEDURE — 36415 COLL VENOUS BLD VENIPUNCTURE: CPT

## 2024-04-17 NOTE — CARE COORDINATION
(separate piece that is placed onto the bottom of the shoe.                How far (in feet) is the patient ambulating?: 0   Does patient use an assistive device?: Yes   Assistive Devices: brace   Rehab Notes:        SW/Discharge Planning  Appointment Details-    Goals of Care: : Pt motivated to regain function and independence   Caregiver support:    Additional caregiver needs:    Anticipated date for discharge: 4/30/24   Discharge Planning / Barriers:    Care Progression on Track?: Pos  Next IDT Planned Review: 4/24/24     Gayle Richardson RN Post Acute Care Manager [phone]

## 2024-04-18 ENCOUNTER — HOSPITAL ENCOUNTER (OUTPATIENT)
Age: 84
Setting detail: SPECIMEN
Discharge: HOME | End: 2024-04-18

## 2024-04-18 LAB
CREAT SERPL-MCNC: 0.7 MG/DL (ref 0.5–0.9)
GFR SERPL CREATININE-BSD FRML MDRD: 86 ML/MIN/1.73M2
VANCOMYCIN TROUGH SERPL-MCNC: 12.4 UG/ML (ref 10–20)

## 2024-04-18 PROCEDURE — 82565 ASSAY OF CREATININE: CPT

## 2024-04-18 PROCEDURE — 36415 COLL VENOUS BLD VENIPUNCTURE: CPT

## 2024-04-18 PROCEDURE — 80202 ASSAY OF VANCOMYCIN: CPT

## 2024-04-18 PROCEDURE — P9603 ONE-WAY ALLOW PRORATED MILES: HCPCS

## 2024-04-19 ENCOUNTER — TELEPHONE (OUTPATIENT)
Dept: WOUND CARE | Age: 84
End: 2024-04-19

## 2024-04-19 ENCOUNTER — HOSPITAL ENCOUNTER (OUTPATIENT)
Age: 84
Setting detail: SPECIMEN
Discharge: HOME | End: 2024-04-19

## 2024-04-19 ENCOUNTER — TELEPHONE (OUTPATIENT)
Dept: ORTHOPEDIC SURGERY | Age: 84
End: 2024-04-19

## 2024-04-19 ENCOUNTER — HOSPITAL ENCOUNTER (OUTPATIENT)
Dept: WOUND CARE | Age: 84
Discharge: HOME | End: 2024-04-19
Payer: MEDICARE

## 2024-04-19 VITALS
HEIGHT: 58 IN | RESPIRATION RATE: 18 BRPM | WEIGHT: 166 LBS | DIASTOLIC BLOOD PRESSURE: 104 MMHG | SYSTOLIC BLOOD PRESSURE: 152 MMHG | TEMPERATURE: 98 F | HEART RATE: 89 BPM | BODY MASS INDEX: 34.85 KG/M2

## 2024-04-19 DIAGNOSIS — S81.002A OPEN WOUND OF LEFT KNEE, INITIAL ENCOUNTER: Primary | ICD-10-CM

## 2024-04-19 LAB
CRP SERPL HS-MCNC: 35.6 MG/L (ref 0–5)
CRP SERPL HS-MCNC: 37.7 MG/L (ref 0–5)
ERYTHROCYTE [DISTWIDTH] IN BLOOD BY AUTOMATED COUNT: 17 % (ref 11.8–14.4)
ERYTHROCYTE [SEDIMENTATION RATE] IN BLOOD BY PHOTOMETRIC METHOD: 94 MM/HR (ref 0–30)
HCT VFR BLD AUTO: 31.9 % (ref 36.3–47.1)
HGB BLD-MCNC: 9.3 G/DL (ref 11.9–15.1)
MCH RBC QN AUTO: 27.4 PG (ref 25.2–33.5)
MCHC RBC AUTO-ENTMCNC: 29.2 G/DL (ref 28.4–34.8)
MCV RBC AUTO: 93.8 FL (ref 82.6–102.9)
NRBC BLD-RTO: 0 PER 100 WBC
PLATELET # BLD AUTO: 298 K/UL (ref 138–453)
PMV BLD AUTO: 13.6 FL (ref 8.1–13.5)
RBC # BLD AUTO: 3.4 M/UL (ref 3.95–5.11)
WBC OTHER # BLD: 7.7 K/UL (ref 3.5–11.3)

## 2024-04-19 PROCEDURE — P9603 ONE-WAY ALLOW PRORATED MILES: HCPCS

## 2024-04-19 PROCEDURE — 85027 COMPLETE CBC AUTOMATED: CPT

## 2024-04-19 PROCEDURE — 36415 COLL VENOUS BLD VENIPUNCTURE: CPT

## 2024-04-19 PROCEDURE — 85652 RBC SED RATE AUTOMATED: CPT

## 2024-04-19 PROCEDURE — 86140 C-REACTIVE PROTEIN: CPT

## 2024-04-19 PROCEDURE — 17250 CHEM CAUT OF GRANLTJ TISSUE: CPT

## 2024-04-19 PROCEDURE — 99214 OFFICE O/P EST MOD 30 MIN: CPT | Performed by: INTERNAL MEDICINE

## 2024-04-19 RX ORDER — IBUPROFEN 200 MG
TABLET ORAL ONCE
OUTPATIENT
Start: 2024-04-19 | End: 2024-04-19

## 2024-04-19 RX ORDER — BACITRACIN ZINC AND POLYMYXIN B SULFATE 500; 1000 [USP'U]/G; [USP'U]/G
OINTMENT TOPICAL ONCE
OUTPATIENT
Start: 2024-04-19 | End: 2024-04-19

## 2024-04-19 RX ORDER — GENTAMICIN SULFATE 1 MG/G
OINTMENT TOPICAL ONCE
OUTPATIENT
Start: 2024-04-19 | End: 2024-04-19

## 2024-04-19 RX ORDER — LIDOCAINE HYDROCHLORIDE 40 MG/ML
SOLUTION TOPICAL ONCE
Status: COMPLETED | OUTPATIENT
Start: 2024-04-19 | End: 2024-04-19

## 2024-04-19 RX ORDER — SODIUM CHLOR/HYPOCHLOROUS ACID 0.033 %
SOLUTION, IRRIGATION IRRIGATION ONCE
OUTPATIENT
Start: 2024-04-19 | End: 2024-04-19

## 2024-04-19 RX ORDER — TRIAMCINOLONE ACETONIDE 1 MG/G
OINTMENT TOPICAL ONCE
OUTPATIENT
Start: 2024-04-19 | End: 2024-04-19

## 2024-04-19 RX ORDER — GINSENG 100 MG
CAPSULE ORAL ONCE
OUTPATIENT
Start: 2024-04-19 | End: 2024-04-19

## 2024-04-19 RX ORDER — POTASSIUM CHLORIDE 20MEQ/15ML
20 LIQUID (ML) ORAL 2 TIMES DAILY
COMMUNITY

## 2024-04-19 RX ORDER — LIDOCAINE HYDROCHLORIDE 40 MG/ML
SOLUTION TOPICAL ONCE
OUTPATIENT
Start: 2024-04-19 | End: 2024-04-19

## 2024-04-19 RX ORDER — TRAMADOL HYDROCHLORIDE 50 MG/1
50 TABLET ORAL EVERY 6 HOURS PRN
COMMUNITY

## 2024-04-19 RX ORDER — LIDOCAINE 50 MG/G
OINTMENT TOPICAL ONCE
OUTPATIENT
Start: 2024-04-19 | End: 2024-04-19

## 2024-04-19 RX ORDER — CLOBETASOL PROPIONATE 0.5 MG/G
OINTMENT TOPICAL ONCE
OUTPATIENT
Start: 2024-04-19 | End: 2024-04-19

## 2024-04-19 RX ORDER — LORAZEPAM 0.5 MG/1
0.5 TABLET ORAL EVERY 12 HOURS PRN
COMMUNITY

## 2024-04-19 RX ORDER — BENZONATATE 100 MG/1
100 CAPSULE ORAL EVERY 6 HOURS PRN
COMMUNITY

## 2024-04-19 RX ORDER — LIDOCAINE 40 MG/G
CREAM TOPICAL ONCE
OUTPATIENT
Start: 2024-04-19 | End: 2024-04-19

## 2024-04-19 RX ORDER — LIDOCAINE HYDROCHLORIDE 20 MG/ML
JELLY TOPICAL ONCE
OUTPATIENT
Start: 2024-04-19 | End: 2024-04-19

## 2024-04-19 RX ORDER — BETAMETHASONE DIPROPIONATE 0.5 MG/G
CREAM TOPICAL ONCE
OUTPATIENT
Start: 2024-04-19 | End: 2024-04-19

## 2024-04-19 RX ADMIN — LIDOCAINE HYDROCHLORIDE 7.5 ML: 40 SOLUTION TOPICAL at 09:55

## 2024-04-19 ASSESSMENT — PAIN SCALES - GENERAL: PAINLEVEL_OUTOF10: 0

## 2024-04-19 NOTE — DISCHARGE INSTRUCTIONS
[facility] WOUND and HYPERBARIC TREATMENT  CENTER      Visit  Discharge Instructions / Physician Orders  DATE:4/19/24     Home Care:   [facility]  CBC SEDRATE CRP TODAY SEND RESULTS TO WOUND CARE [phone]     SUPPLIES ORDERED THRU:                     DATE LAST SUPPLIED     Wound Location:  Left Knee Incision     Cleanse with: Liquid antibacterial soap and water, rinse well      Dressing Orders: HOLD Wound vac Due to bleeding from incision line. Primary dressing  surgicel today along incision line to assist with bleeding-cover with ABD pads do not change until tomorrow  Then lay silvercel along incision line with ABD pad use straps to secure abd NO TAPE     Frequency: leave current dressing for 24 hours then start Silvercel dressing change every three days and prn       Additional Orders: Increase protein to diet (meat, cheese, eggs, fish, peanut butter, nuts and beans)  Multivitamin daily  If wound continues to bleed send patient to ER( as per instructions of  Office)  OFFLOADING [] YES  TYPE:                  [x] NA    Weekly wound care visits until determined otherwise.    Antibiotic therapy-wound care related YES [x] NO [] NA[]  IV Vancomycin Thru Dr. Arteaga Discontinue vanco on 4/24/24 and remove picc line at this time  Start Doxycycline 100 mg 2x per day for 1 month  MY CHART []     Smart Device  []     HYPERBARIC TREATMENT-                TREATMENT #                          Your next appointment with the Wound Care Center is in 1 week                                                                                                   (Please note your next appointment above and if you are unable to keep, kindly give a 24 hour notice. Thank you.)  If more than 15 min late we cannot guarantee you will be seen due to clinician schedule  Per Policy, Excessive cancellation will call for dismissal from program.  If you experience any of the following, please call the Wound Care Center during business hours:

## 2024-04-19 NOTE — TELEPHONE ENCOUNTER
Call to [facility] regarding patient having ashley bleeding in wound vac cannister and bleeding coming from incision line. Explained that Silver nitrate and Surgicel was used to stop bleeding.  Nurse was encouraged to check area (look under abd pad do not remove surgicel) Explained to her that wound care attempted to contact Dr. Keyes office but no one was available. As bleeding had slowed down she will be sent back to facility. Explained to Jody that  would like some labs drawn today and they need to be sent to wound care.  If bleeding continues or starts back up Dr. Keyes office staff gave direction to send patient to ER for evaluation.  Jody verbalized understanding.

## 2024-04-19 NOTE — TELEPHONE ENCOUNTER
Zhen, a nurse with wound care at [facility] called as they have the patient currently in to see Dr. Arteaga with infectious disease and wound care. The patient is currently 17 days S/P left knee I&D and wound closure with prevena application on 4/2/24. She states that they removed the wound vac, but the patient is actively bleeding a concerning amount from the incision site. She inquired about Dr. Keyes seeing the patient, but is advised that he is out of office until Monday 4/22. She stated that due to the bleeding, they would not be able to put on another wound vac. Advised that if they are concerned about the surgical site and the bleeding, recommend the patient be sent to the ER and they can consult the on call physician if needed.

## 2024-04-22 ENCOUNTER — HOSPITAL ENCOUNTER (OUTPATIENT)
Age: 84
Setting detail: SPECIMEN
Discharge: HOME | End: 2024-04-22

## 2024-04-22 LAB
ANION GAP SERPL CALCULATED.3IONS-SCNC: 10 MMOL/L (ref 9–16)
BASOPHILS # BLD: 0.09 K/UL (ref 0–0.2)
BASOPHILS NFR BLD: 1 % (ref 0–2)
BUN SERPL-MCNC: 11 MG/DL (ref 8–23)
CALCIUM SERPL-MCNC: 8.6 MG/DL (ref 8.6–10.4)
CHLORIDE SERPL-SCNC: 106 MMOL/L (ref 98–107)
CO2 SERPL-SCNC: 21 MMOL/L (ref 20–31)
CREAT SERPL-MCNC: 0.6 MG/DL (ref 0.5–0.9)
EOSINOPHIL # BLD: 0.73 K/UL (ref 0–0.44)
EOSINOPHILS RELATIVE PERCENT: 9 % (ref 1–4)
ERYTHROCYTE [DISTWIDTH] IN BLOOD BY AUTOMATED COUNT: 16.8 % (ref 11.8–14.4)
GFR SERPL CREATININE-BSD FRML MDRD: 89 ML/MIN/1.73M2
GLUCOSE SERPL-MCNC: 122 MG/DL (ref 74–99)
HCT VFR BLD AUTO: 26.7 % (ref 36.3–47.1)
HGB BLD-MCNC: 8.2 G/DL (ref 11.9–15.1)
IMM GRANULOCYTES # BLD AUTO: 0.05 K/UL (ref 0–0.3)
IMM GRANULOCYTES NFR BLD: 1 %
LYMPHOCYTES NFR BLD: 1.51 K/UL (ref 1.1–3.7)
LYMPHOCYTES RELATIVE PERCENT: 18 % (ref 24–43)
MCH RBC QN AUTO: 27.6 PG (ref 25.2–33.5)
MCHC RBC AUTO-ENTMCNC: 30.7 G/DL (ref 28.4–34.8)
MCV RBC AUTO: 89.9 FL (ref 82.6–102.9)
MONOCYTES NFR BLD: 0.72 K/UL (ref 0.1–1.2)
MONOCYTES NFR BLD: 9 % (ref 3–12)
NEUTROPHILS NFR BLD: 63 % (ref 36–65)
NEUTS SEG NFR BLD: 5.16 K/UL (ref 1.5–8.1)
NRBC BLD-RTO: 0 PER 100 WBC
PLATELET # BLD AUTO: ABNORMAL K/UL (ref 138–453)
PLATELET, FLUORESCENCE: 236 K/UL (ref 138–453)
PLATELETS.RETICULATED NFR BLD AUTO: 11.6 % (ref 1.1–10.3)
POTASSIUM SERPL-SCNC: 3.9 MMOL/L (ref 3.7–5.3)
RBC # BLD AUTO: 2.97 M/UL (ref 3.95–5.11)
RBC # BLD: ABNORMAL 10*6/UL
SODIUM SERPL-SCNC: 137 MMOL/L (ref 136–145)
WBC OTHER # BLD: 8.3 K/UL (ref 3.5–11.3)

## 2024-04-22 PROCEDURE — P9603 ONE-WAY ALLOW PRORATED MILES: HCPCS

## 2024-04-22 PROCEDURE — 80048 BASIC METABOLIC PNL TOTAL CA: CPT

## 2024-04-22 PROCEDURE — 85055 RETICULATED PLATELET ASSAY: CPT

## 2024-04-22 PROCEDURE — 85025 COMPLETE CBC W/AUTO DIFF WBC: CPT

## 2024-04-23 ENCOUNTER — OUTSIDE SERVICES (OUTPATIENT)
Dept: INFECTIOUS DISEASES | Age: 84
End: 2024-04-23

## 2024-04-23 ENCOUNTER — HOSPITAL ENCOUNTER (OUTPATIENT)
Age: 84
Setting detail: SPECIMEN
Discharge: HOME | End: 2024-04-23

## 2024-04-23 DIAGNOSIS — T81.49XA METHICILLIN-RESISTANT STAPHYLOCOCCUS AUREUS INFECTION OF POSTOPERATIVE WOUND: Primary | ICD-10-CM

## 2024-04-23 DIAGNOSIS — R79.82 ELEVATED C-REACTIVE PROTEIN (CRP): ICD-10-CM

## 2024-04-23 DIAGNOSIS — Z88.1 ALLERGY TO MULTIPLE ANTIBIOTICS: ICD-10-CM

## 2024-04-23 DIAGNOSIS — D72.10 EOSINOPHILIA, UNSPECIFIED TYPE: ICD-10-CM

## 2024-04-23 DIAGNOSIS — B95.62 METHICILLIN-RESISTANT STAPHYLOCOCCUS AUREUS INFECTION OF POSTOPERATIVE WOUND: Primary | ICD-10-CM

## 2024-04-23 LAB
CREAT SERPL-MCNC: 0.6 MG/DL (ref 0.5–0.9)
GFR SERPL CREATININE-BSD FRML MDRD: 90 ML/MIN/1.73M2
VANCOMYCIN TROUGH SERPL-MCNC: 12.8 UG/ML (ref 10–20)

## 2024-04-23 PROCEDURE — 82565 ASSAY OF CREATININE: CPT

## 2024-04-23 PROCEDURE — 80202 ASSAY OF VANCOMYCIN: CPT

## 2024-04-24 ENCOUNTER — TELEPHONE (OUTPATIENT)
Dept: INFECTIOUS DISEASES | Age: 84
End: 2024-04-24

## 2024-04-24 ENCOUNTER — HOSPITAL ENCOUNTER (OUTPATIENT)
Age: 84
Setting detail: SPECIMEN
Discharge: HOME | End: 2024-04-24

## 2024-04-24 ENCOUNTER — OFFICE VISIT (OUTPATIENT)
Dept: ORTHOPEDIC SURGERY | Age: 84
End: 2024-04-24

## 2024-04-24 ENCOUNTER — TELEPHONE (OUTPATIENT)
Dept: ORTHOPEDIC SURGERY | Age: 84
End: 2024-04-24

## 2024-04-24 ENCOUNTER — CARE COORDINATION (OUTPATIENT)
Dept: CARE COORDINATION | Age: 84
End: 2024-04-24

## 2024-04-24 VITALS
TEMPERATURE: 98 F | DIASTOLIC BLOOD PRESSURE: 72 MMHG | HEART RATE: 83 BPM | OXYGEN SATURATION: 97 % | SYSTOLIC BLOOD PRESSURE: 132 MMHG | RESPIRATION RATE: 16 BRPM

## 2024-04-24 DIAGNOSIS — Z96.652 HISTORY OF TOTAL KNEE ARTHROPLASTY, LEFT: Primary | ICD-10-CM

## 2024-04-24 LAB
ANION GAP SERPL CALCULATED.3IONS-SCNC: 11 MMOL/L (ref 9–16)
BUN SERPL-MCNC: 10 MG/DL (ref 8–23)
CALCIUM SERPL-MCNC: 9 MG/DL (ref 8.6–10.4)
CHLORIDE SERPL-SCNC: 104 MMOL/L (ref 98–107)
CO2 SERPL-SCNC: 20 MMOL/L (ref 20–31)
CREAT SERPL-MCNC: 0.6 MG/DL (ref 0.5–0.9)
ERYTHROCYTE [DISTWIDTH] IN BLOOD BY AUTOMATED COUNT: 16.8 % (ref 11.8–14.4)
GFR SERPL CREATININE-BSD FRML MDRD: 88 ML/MIN/1.73M2
GLUCOSE SERPL-MCNC: 94 MG/DL (ref 74–99)
HCT VFR BLD AUTO: 28.1 % (ref 36.3–47.1)
HGB BLD-MCNC: 8.5 G/DL (ref 11.9–15.1)
MCH RBC QN AUTO: 27.1 PG (ref 25.2–33.5)
MCHC RBC AUTO-ENTMCNC: 30.2 G/DL (ref 28.4–34.8)
MCV RBC AUTO: 89.5 FL (ref 82.6–102.9)
NRBC BLD-RTO: 0 PER 100 WBC
PLATELET # BLD AUTO: ABNORMAL K/UL (ref 138–453)
PLATELET, FLUORESCENCE: 229 K/UL (ref 138–453)
PLATELETS.RETICULATED NFR BLD AUTO: 14 % (ref 1.1–10.3)
POTASSIUM SERPL-SCNC: 4.4 MMOL/L (ref 3.7–5.3)
RBC # BLD AUTO: 3.14 M/UL (ref 3.95–5.11)
SODIUM SERPL-SCNC: 135 MMOL/L (ref 136–145)
WBC OTHER # BLD: 7.7 K/UL (ref 3.5–11.3)

## 2024-04-24 PROCEDURE — 80048 BASIC METABOLIC PNL TOTAL CA: CPT

## 2024-04-24 PROCEDURE — 99024 POSTOP FOLLOW-UP VISIT: CPT | Performed by: ORTHOPAEDIC SURGERY

## 2024-04-24 PROCEDURE — P9603 ONE-WAY ALLOW PRORATED MILES: HCPCS

## 2024-04-24 PROCEDURE — 85055 RETICULATED PLATELET ASSAY: CPT

## 2024-04-24 PROCEDURE — 85027 COMPLETE CBC AUTOMATED: CPT

## 2024-04-24 ASSESSMENT — ENCOUNTER SYMPTOMS: COUGH: 0

## 2024-04-24 NOTE — PROGRESS NOTES
Komal returns today.  She is status post revision total knee arthroplasty for periprosthetic femur fracture of the left total knee.  She subsequently on fortunately developed an infectious process into this and she did go under went a irrigation debridement and had a wound VAC placement.  Since that time we have turned the patient back to surgery as it was noticed that she had ruptured her extensor mechanism in the quadriceps area and the prosthesis was available beneath the sponge she was taken back to surgery where she had a reirrigation debridement of this and had a closure.  We were able to get the wound closed at that time with a large multiple Prolene sutures both near the incision as well as deep.  She was placed into a hinged knee brace locked in full extension to protect repair.  She is presently under IV antibiotics per Dr. Arteaga with infectious disease.  She is presently still maintaining at [facility] and she is here for wound follow-up    Patient denies any fever or chills she states he is really not have much in the way of pain she does state that they really have not been getting her up in therapy.    Examination of the patient's wound notes the multiple Prolene sutures are in place the superior one third and the lower one third are completely closed off in the middle third she has a little bit of slight gaping of the incision but only as several small areas that are basically the pencil eraser size.  She has nonpurulent fluid and material present to this area.  There is really not much in way of any surrounding redness per se and no fluid can be expressed.    We took the liberty of removing the upper and lower portions Prolenes would have met left the middle ones in place as we feel this would tension would allow the wound to open up further.    Our plan at this time is to have the patient return in 1 week's time to reevaluate the wound in the meantime a new Justyna was applied and so to allow to

## 2024-04-24 NOTE — TELEPHONE ENCOUNTER
Called Dr. Conte office to let them know we dont want them to take Pts wound vac off when they see her in 2 days.  I was told that pt wants to discuss the fact that her antibiotic is also causing her double vision.  LM on Dr. Arteaga office VM due to no one answering the phone.  Gave strict instructions not to take wound vac off at all!

## 2024-04-24 NOTE — DISCHARGE INSTRUCTIONS
[facility] WOUND and HYPERBARIC TREATMENT  CENTER                            Visit  Discharge Instructions / Physician Orders  DATE:4/26/24     Home Care:   [facility]  CBC SEDRATE CRP TODAY SEND RESULTS TO WOUND CARE [phone]     SUPPLIES ORDERED THRU:                     DATE LAST SUPPLIED     Wound Location:  Left Knee Incision     Cleanse with: Liquid antibacterial soap and water, rinse well      Dressing Orders: danica, ABD, mefix tape.     Frequency: change daily.     Additional Orders: Increase protein to diet (meat, cheese, eggs, fish, peanut butter, nuts and beans)  Multivitamin daily  If wound continues to bleed send patient to ER( as per instructions of  Office)  OFFLOADING [] YES  TYPE:                  [x] NA     Weekly wound care visits until determined otherwise.     Antibiotic therapy-wound care related YES [x] NO [] NA[]  IV Vancomycin Thru Dr. Arteaga Discontinue vanco on 4/24/24 and remove picc line at this time  Start Doxycycline 100 mg 2x per day for 1 month  MY CHART []     Smart Device  []      HYPERBARIC TREATMENT-                TREATMENT #                          Your next appointment with the Wound Care Center is in 2 week                                                                                                   (Please note your next appointment above and if you are unable to keep, kindly give a 24 hour notice. Thank you.)  If more than 15 min late we cannot guarantee you will be seen due to clinician schedule  Per Policy, Excessive cancellation will call for dismissal from program.  If you experience any of the following, please call the Wound Care Center during business hours:  [phone]     * Increase in Pain  * Temperature over 101  * Increase in drainage from your wound  * Drainage with a foul odor  * Bleeding  * Increase in swelling  * Need for compression bandage changes due to slippage, breakthrough drainage.     If you need medical attention outside of the

## 2024-04-24 NOTE — CARE COORDINATION
Care Transitions Post-Acute Facility Update Call    2024    Patient: Komal Crawford Patient : 1940   MRN: [id number]  Reason for Admission: Status post irrigation debridement infected left total knee with quadriceps tendon repair  Discharge Date: 24 RARS: Readmission Risk Score: 32.3    Care Transitions Post Acute Facility Update    Care Transitions Interventions    Physical Therapy: Completed          Post Acute Facility Update   Post Acute Facility: [facility]          Nursing    wound vac to surgical site RLE functioning properly,no redness or warmth noted to site.Immobilizer opened for skin check with no abnormal areas present. Pt has follow up appt with Ortho surgeon    /76 - 2024 00:56 Position: Lying l/arm P 79 - 2024 00:56 PulseType: Regular  Temp: T 97.9 - 2024 00:56 Route: Forehead (non-contact)  Respiratory: R 18.0 - 2024 00:56 , regular. Lung sounds clear. O2 97.0 % - 2024 00:56 Method: Room       Prescribed diet: .    CCD diet, Regular texture, Thin consistency increase protein to diet for Diet       Nutrition intake assessment: .   Skilled Medication therapies: PT / OT    Medical equipment being used: BRACE POST OP   Disease specific clinical considerations: .   Reported Nursing Updates: .    Hoping today after f/u with surgeon will have new orders for her to shower.    Rehab/Functional   Prior Level of Functioning: .   Cognitive function assessment: .  Activities of Daily Living ADL Feeding: Setup   Grooming: Setup   UB set up, LB Mod to Max, Max assist to doff hinged brace, Transfers are min assist. Ambulated 6 feet with min assist.  Follows up on Friday with ortho doc   Extensive assist of 1 in bed mobility. Did not transfer during this shift. Extensive assist of 1 in toilet use.   Skilled Clinical Factors: brief/reeves catheter   Additional Comments: pt completes UB bathing/dressing and grooming tasks sitting EOB. Above levels based on pt report,

## 2024-04-24 NOTE — TELEPHONE ENCOUNTER
Wasn't sure who to send this to. Infectious Disease received a voice mail on one of your patients.     Strict instructions per Dr Andre, do NOT remove the wound vac. Patient will get a new one placed 5/1/24.     Patient reports double vision with the antibiotic she is on.  She will discuss at her next visitt (tomorrow).

## 2024-04-25 ENCOUNTER — OUTSIDE SERVICES (OUTPATIENT)
Dept: INFECTIOUS DISEASES | Age: 84
End: 2024-04-25

## 2024-04-25 DIAGNOSIS — T81.49XA METHICILLIN-RESISTANT STAPHYLOCOCCUS AUREUS INFECTION OF POSTOPERATIVE WOUND: Primary | ICD-10-CM

## 2024-04-25 DIAGNOSIS — Z88.1 ALLERGY TO MULTIPLE ANTIBIOTICS: ICD-10-CM

## 2024-04-25 DIAGNOSIS — B95.62 METHICILLIN-RESISTANT STAPHYLOCOCCUS AUREUS INFECTION OF POSTOPERATIVE WOUND: Primary | ICD-10-CM

## 2024-04-25 DIAGNOSIS — R79.82 ELEVATED C-REACTIVE PROTEIN (CRP): ICD-10-CM

## 2024-04-25 DIAGNOSIS — D72.10 EOSINOPHILIA, UNSPECIFIED TYPE: ICD-10-CM

## 2024-04-25 NOTE — PROGRESS NOTES
glasses        Past Surgical  History:     Past Surgical History:   Procedure Laterality Date    APPENDECTOMY  1962    BLADDER SURGERY      bladder stimulator    BLADDER SUSPENSION  2002    multiple    CARDIAC CATHETERIZATION  2008    no stents    CARDIOVASCULAR STRESS TEST  12/2014    CATARACT REMOVAL WITH IMPLANT Left 11/07/2017    Tyree/Sulaiman    CATARACT REMOVAL WITH IMPLANT Right 11/28/2017    Tyree/Sulaiman    COLON SURGERY      colostomy reversal    COLONOSCOPY  04/07/2016    tubular adenoma x3; internal hemorrhoids    COLONOSCOPY N/A 11/06/2019    COLONOSCOPY DIAGNOSTIC performed by Eli Marinelli MD at [facility] ENDO    COLONOSCOPY  11/06/2019    COLOSTOMY  1986    bowel obstruction/ rupture from diverticular disease, reversal 3 mos later    CORONARY ANGIOPLASTY WITH STENT PLACEMENT  08/04/2022    CYSTOSCOPY  09/08/2017    W/ 200IU Botox     FRACTURE SURGERY Right 2011    hip    FRACTURE SURGERY Left 2001    WRIST    FRACTURE SURGERY Left 2013    ankle    HERNIA REPAIR      HIP SURGERY Right 11/6/2023    HIP HARDWARE REMOVAL - WITH DEEP HARDWARE REMOVAL 7.3 NABILA SCREW   X3 performed by Jon Bonner MD at [facility] OR    HYSTERECTOMY (CERVIX STATUS UNKNOWN)  1969    JOINT REPLACEMENT Bilateral 2000    BILAT KNEES    KNEE SURGERY Left 3/8/2024    KNEE INCISION AND DRAINAGE WITH WOUND VAC APPLICATION LEFT performed by Damian Keyes MD at [facility] OR    KNEE SURGERY Left 4/2/2024    LEFT KNEE INCISION AND DRAINAGE, BACTISURE WOUND LAVAGE, AND WOUND CLOSURE WITH PREVENA APPLICATION performed by Damian Keyes MD at [facility] OR    LEG MUSCLE SURGERY Left 4/2/2024    REPAIR OF QUADRACEPTS TENDON, performed by Damian Keyes MD at [facility] OR    LUMBAR FUSION  2017    L2/L3    FL XCAPSL CTRC RMVL INSJ IO LENS PROSTH W/O ECP Left 11/07/2017    EYE CATARACT EMULSIFICATION IOL IMPLANT performed by Missy Clements MD at [facility] OR    FL XCAPSL CTRC RMVL INSJ IO LENS PROSTH W/O ECP Right 11/28/2017    EYE

## 2024-04-26 ENCOUNTER — HOSPITAL ENCOUNTER (OUTPATIENT)
Dept: WOUND CARE | Age: 84
Discharge: HOME | End: 2024-04-26
Payer: MEDICARE

## 2024-04-26 VITALS
WEIGHT: 166 LBS | RESPIRATION RATE: 16 BRPM | BODY MASS INDEX: 34.85 KG/M2 | HEIGHT: 58 IN | SYSTOLIC BLOOD PRESSURE: 152 MMHG | TEMPERATURE: 96.9 F | DIASTOLIC BLOOD PRESSURE: 66 MMHG | HEART RATE: 85 BPM

## 2024-04-26 DIAGNOSIS — S81.002A OPEN WOUND OF LEFT KNEE, INITIAL ENCOUNTER: Primary | ICD-10-CM

## 2024-04-26 PROCEDURE — 99213 OFFICE O/P EST LOW 20 MIN: CPT

## 2024-04-26 PROCEDURE — 99214 OFFICE O/P EST MOD 30 MIN: CPT | Performed by: INTERNAL MEDICINE

## 2024-04-26 RX ORDER — CLOBETASOL PROPIONATE 0.5 MG/G
OINTMENT TOPICAL ONCE
OUTPATIENT
Start: 2024-04-26 | End: 2024-04-26

## 2024-04-26 RX ORDER — GINSENG 100 MG
CAPSULE ORAL ONCE
OUTPATIENT
Start: 2024-04-26 | End: 2024-04-26

## 2024-04-26 RX ORDER — BACITRACIN ZINC AND POLYMYXIN B SULFATE 500; 1000 [USP'U]/G; [USP'U]/G
OINTMENT TOPICAL ONCE
OUTPATIENT
Start: 2024-04-26 | End: 2024-04-26

## 2024-04-26 RX ORDER — IBUPROFEN 200 MG
TABLET ORAL ONCE
OUTPATIENT
Start: 2024-04-26 | End: 2024-04-26

## 2024-04-26 RX ORDER — GENTAMICIN SULFATE 1 MG/G
OINTMENT TOPICAL ONCE
OUTPATIENT
Start: 2024-04-26 | End: 2024-04-26

## 2024-04-26 RX ORDER — SODIUM CHLOR/HYPOCHLOROUS ACID 0.033 %
SOLUTION, IRRIGATION IRRIGATION ONCE
OUTPATIENT
Start: 2024-04-26 | End: 2024-04-26

## 2024-04-26 RX ORDER — LIDOCAINE HYDROCHLORIDE 20 MG/ML
JELLY TOPICAL ONCE
OUTPATIENT
Start: 2024-04-26 | End: 2024-04-26

## 2024-04-26 RX ORDER — LIDOCAINE 50 MG/G
OINTMENT TOPICAL ONCE
OUTPATIENT
Start: 2024-04-26 | End: 2024-04-26

## 2024-04-26 RX ORDER — LIDOCAINE HYDROCHLORIDE 40 MG/ML
SOLUTION TOPICAL ONCE
Status: COMPLETED | OUTPATIENT
Start: 2024-04-26 | End: 2024-04-26

## 2024-04-26 RX ORDER — LIDOCAINE 40 MG/G
CREAM TOPICAL ONCE
OUTPATIENT
Start: 2024-04-26 | End: 2024-04-26

## 2024-04-26 RX ORDER — LIDOCAINE HYDROCHLORIDE 40 MG/ML
SOLUTION TOPICAL ONCE
OUTPATIENT
Start: 2024-04-26 | End: 2024-04-26

## 2024-04-26 RX ORDER — TRIAMCINOLONE ACETONIDE 1 MG/G
OINTMENT TOPICAL ONCE
OUTPATIENT
Start: 2024-04-26 | End: 2024-04-26

## 2024-04-26 RX ORDER — BETAMETHASONE DIPROPIONATE 0.5 MG/G
CREAM TOPICAL ONCE
OUTPATIENT
Start: 2024-04-26 | End: 2024-04-26

## 2024-04-26 RX ADMIN — LIDOCAINE HYDROCHLORIDE 5 ML: 40 SOLUTION TOPICAL at 10:02

## 2024-04-26 ASSESSMENT — PAIN SCALES - GENERAL: PAINLEVEL_OUTOF10: 0

## 2024-04-26 NOTE — PROGRESS NOTES
36.5
Fumarate Er]     Clonazepam Other (See Comments)     From neuro: made her too sleepy    Metformin And Related Nausea And Vomiting     Diarrhea and N/V       MEDICATIONS    Current Outpatient Medications on File Prior to Encounter   Medication Sig Dispense Refill    LORazepam (ATIVAN) 0.5 MG tablet Take 1 tablet by mouth every 12 hours as needed for Anxiety.      benzonatate (TESSALON) 100 MG capsule Take 1 capsule by mouth every 6 hours as needed for Cough      potassium chloride 20 MEQ/15ML (10%) oral solution Take 15 mLs by mouth 2 times daily      traMADol (ULTRAM) 50 MG tablet Take 1 tablet by mouth every 6 hours as needed for Pain.      diphenhydrAMINE (BENADRYL) 25 MG capsule Take 1 capsule by mouth every 6 hours as needed for Itching or Allergies (Patient not taking: Reported on 4/2/2024)      hydrALAZINE (APRESOLINE) 25 MG tablet Take 1 tablet by mouth daily For SBP >160      Multiple Vitamins-Minerals (THERAPEUTIC MULTIVITAMIN-MINERALS) tablet Take 1 tablet by mouth daily      nystatin (MYCOSTATIN) [id number] UNIT/ML suspension Take 5 mLs by mouth 4 times daily      omeprazole (PRILOSEC) 20 MG delayed release capsule Take 1 capsule by mouth daily      senna (SENOKOT) 8.6 MG tablet Take 1 tablet by mouth 2 times daily For constipation      aspirin 81 MG chewable tablet Take 1 tablet by mouth daily 30 tablet 0    insulin lispro (HUMALOG) 100 UNIT/ML SOLN injection vial Inject 0-8 Units into the skin 3 times daily (with meals) (Patient not taking: Reported on 3/22/2024)      insulin lispro (HUMALOG) 100 UNIT/ML SOLN injection vial Inject 0-4 Units into the skin nightly (Patient not taking: Reported on 3/22/2024)      diclofenac sodium (VOLTAREN) 1 % GEL Apply 4 g topically 2 times daily      midodrine (PROAMATINE) 2.5 MG tablet Take 1 tablet by mouth 3 times daily as needed (sbp <100) 90 tablet 3    dicyclomine (BENTYL) 10 MG capsule Take 1 capsule by mouth 3 times daily as needed (For bladder spasm) 120 capsule 3

## 2024-04-28 VITALS
DIASTOLIC BLOOD PRESSURE: 72 MMHG | HEART RATE: 70 BPM | OXYGEN SATURATION: 95 % | TEMPERATURE: 97.3 F | RESPIRATION RATE: 16 BRPM | SYSTOLIC BLOOD PRESSURE: 122 MMHG

## 2024-04-30 ENCOUNTER — OUTSIDE SERVICES (OUTPATIENT)
Dept: INFECTIOUS DISEASES | Age: 84
End: 2024-04-30

## 2024-04-30 DIAGNOSIS — R79.82 ELEVATED C-REACTIVE PROTEIN (CRP): ICD-10-CM

## 2024-04-30 DIAGNOSIS — Z88.1 ALLERGY TO MULTIPLE ANTIBIOTICS: ICD-10-CM

## 2024-04-30 DIAGNOSIS — D72.10 EOSINOPHILIA, UNSPECIFIED TYPE: ICD-10-CM

## 2024-04-30 DIAGNOSIS — B95.62 METHICILLIN-RESISTANT STAPHYLOCOCCUS AUREUS INFECTION OF POSTOPERATIVE WOUND: Primary | ICD-10-CM

## 2024-04-30 DIAGNOSIS — T81.49XA METHICILLIN-RESISTANT STAPHYLOCOCCUS AUREUS INFECTION OF POSTOPERATIVE WOUND: Primary | ICD-10-CM

## 2024-05-01 ENCOUNTER — HOSPITAL ENCOUNTER (OUTPATIENT)
Age: 84
Setting detail: SPECIMEN
Discharge: HOME | End: 2024-05-01

## 2024-05-01 ENCOUNTER — CARE COORDINATION (OUTPATIENT)
Dept: CARE COORDINATION | Age: 84
End: 2024-05-01

## 2024-05-01 ENCOUNTER — OFFICE VISIT (OUTPATIENT)
Dept: ORTHOPEDIC SURGERY | Age: 84
End: 2024-05-01

## 2024-05-01 VITALS
RESPIRATION RATE: 18 BRPM | DIASTOLIC BLOOD PRESSURE: 69 MMHG | HEART RATE: 72 BPM | TEMPERATURE: 98 F | SYSTOLIC BLOOD PRESSURE: 123 MMHG | OXYGEN SATURATION: 96 %

## 2024-05-01 DIAGNOSIS — Z96.652 HISTORY OF TOTAL KNEE ARTHROPLASTY, LEFT: Primary | ICD-10-CM

## 2024-05-01 LAB
ANION GAP SERPL CALCULATED.3IONS-SCNC: 12 MMOL/L (ref 9–16)
BUN SERPL-MCNC: 21 MG/DL (ref 8–23)
CALCIUM SERPL-MCNC: 9.2 MG/DL (ref 8.6–10.4)
CHLORIDE SERPL-SCNC: 100 MMOL/L (ref 98–107)
CO2 SERPL-SCNC: 22 MMOL/L (ref 20–31)
CREAT SERPL-MCNC: 0.7 MG/DL (ref 0.5–0.9)
ERYTHROCYTE [DISTWIDTH] IN BLOOD BY AUTOMATED COUNT: 16.7 % (ref 11.8–14.4)
GFR, ESTIMATED: 83 ML/MIN/1.73M2
GLUCOSE SERPL-MCNC: 105 MG/DL (ref 74–99)
HCT VFR BLD AUTO: 32.7 % (ref 36.3–47.1)
HGB BLD-MCNC: 9.7 G/DL (ref 11.9–15.1)
MCH RBC QN AUTO: 27.2 PG (ref 25.2–33.5)
MCHC RBC AUTO-ENTMCNC: 29.7 G/DL (ref 28.4–34.8)
MCV RBC AUTO: 91.6 FL (ref 82.6–102.9)
NRBC BLD-RTO: 0 PER 100 WBC
PLATELET # BLD AUTO: ABNORMAL K/UL (ref 138–453)
PLATELET, FLUORESCENCE: 237 K/UL (ref 138–453)
PLATELETS.RETICULATED NFR BLD AUTO: 18.7 % (ref 1.1–10.3)
POTASSIUM SERPL-SCNC: 4 MMOL/L (ref 3.7–5.3)
RBC # BLD AUTO: 3.57 M/UL (ref 3.95–5.11)
SODIUM SERPL-SCNC: 134 MMOL/L (ref 136–145)
WBC OTHER # BLD: 8.2 K/UL (ref 3.5–11.3)

## 2024-05-01 PROCEDURE — 85055 RETICULATED PLATELET ASSAY: CPT

## 2024-05-01 PROCEDURE — 36415 COLL VENOUS BLD VENIPUNCTURE: CPT

## 2024-05-01 PROCEDURE — 99024 POSTOP FOLLOW-UP VISIT: CPT | Performed by: ORTHOPAEDIC SURGERY

## 2024-05-01 PROCEDURE — 80048 BASIC METABOLIC PNL TOTAL CA: CPT

## 2024-05-01 PROCEDURE — 85027 COMPLETE CBC AUTOMATED: CPT

## 2024-05-01 PROCEDURE — P9603 ONE-WAY ALLOW PRORATED MILES: HCPCS

## 2024-05-01 ASSESSMENT — ENCOUNTER SYMPTOMS: COUGH: 0

## 2024-05-01 NOTE — PROGRESS NOTES
[facility] -   Infectious diseases evaluation  admission date 4/6/2024    reason for consultation:   Antibiotic Management    Impression :   Current:  Left  knee postoperative wound infection /cellulitis  3/8    S/p I & D with application of Vancomycin powder and wound vac.  Hardware was retained.  S/p I & D left knee wound, repair of quadriceps tendon, wound lavavge and closure 4/2/24.  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 Resolved.  Allergies to multiple antibiotics.  Chronic reeves catheter/changed 3/6/24.    Other:    Discussion / summary of stay / plan of care/ Recommendations:   Completed 6-week course of Vancomycin 4/24/24.  HENCE:   Doxycycline 100 mg po twice daily 4/25-5/25/2024.  Follow CBC and renal function closely.  Wound care.  Supportive care.  Discussed with patient.    Infection Control Recommendations   [place] Precautions  Contact Isolation     Antimicrobial Stewardship Recommendations   Simplification of therapy  Targeted therapy    History of Present Illness:   Initial history:  Komal Crawford is a 83 y.o.-year-old female s/p left total knee arthroplasty revision on 1/23/24 who presented to [place] on 3/6/24 with left knee incision wound with swelling, warmth and pain with associated fever of 102.6.  Patient underwent I & D of the left knee, application of Vancomycin powder and wound vac placement on 3/8/24.  Intra-operative wound culture grew MRSA.  Treated with Vancomycin IV while at [place]. Discharged to [facility] on 3/12/24 with plan for Vancomycin IV through 4/24/24.    Interval changes 4/30/2024   Sitting up in a wheelchair.  Feeling good.  Dressing to left knee.  Scant strikethrough.  Minimal swelling.  Denies any fever, chills, n/v/d.  Seeing Dr. Keyes tomorrow.  No new labs.      Summary of relevant labs:  Labs:  WBC:

## 2024-05-01 NOTE — PROGRESS NOTES
Komal returns today status post I&D with Bactisure wound lavage repair of extensor mechanism left total knee/hinged knee for periprosthetic femur fracture.  Patient remains in nursing home.  She is on antibiotics per infectious disease she does see wound care as well.  She is here today to assess whether removal of the remainder of the sutures this can be performed also to determine whether repeat Justyna is necessary    Patient states that she has no pain at all    Examination notes that wound overall looks very good she has 1 small area in the middle of the wound which is still remains slightly open there is no active purulent drainage however.  This is significant improvement from the initial wound closure.  Were not motion in her leg due to her quadriceps repair overall the appearance of her wound is doing much better    No new x-rays taken today    Impression as above    Plan patient to have the sutures removed and Aquacel applied do not feel that the Justyna is necessary at this point orders were written to reinforce if necessary continue wound care will see the patient back here in 2 weeks

## 2024-05-02 ENCOUNTER — CARE COORDINATION (OUTPATIENT)
Dept: CARE COORDINATION | Age: 84
End: 2024-05-02

## 2024-05-02 NOTE — CARE COORDINATION
Care Transitions Post-Acute Facility Update Call    2024    Patient: Komal Crawford  Patient : 1940   MRN: [id number]  Reason for Admission: Status post irrigation debridement infected left total knee with quadriceps tendon repair  Discharge Date: 24 RARS: Readmission Risk Score: 32.3    Care Transitions Post Acute Facility Update    Care Transitions Interventions    Physical Therapy: Completed          Post Acute Facility Update   Post Acute Facility: [facility]          Nursing  VS /66 R 18 T 97.7 BS 71 P 75 O2 97% PAIN 0 WEIGHT 162.5LBS      Prescribed diet: .    CCD diet, Regular texture, Thin consistency increase protein to diet for Diet       Nutrition intake assessment: .   Skilled Medication therapies: PT / OT    Medical equipment being used: BRACE POST OP   Disease specific clinical considerations: .   Reported Nursing Updates:     Hoping today after f/u with surgeon will have new orders for her to shower.    Rehab/Functional   Prior Level of Functioning: .   Cognitive function assessment: .  Activities of Daily Living ADL Feeding: Setup   Grooming: Setup   Late loss ADL: Extensive assist of 1 in bed mobility. Did not transfer during this shift. Extensive assist of 1 in toilet use.   Additional Comments: pt completes UB bathing/dressing and grooming tasks sitting EOB. Above levels based on pt report, observation, and clinical reasoning unless otherwise noted. Pt has hinged knee brace (locked in extension) and wound vac on L knee. Pt also requires 2\" lift on R shoe 2* leg length discrepancy (separate piece that is placed onto the bottom of the shoe.                How far (in feet) is the patient ambulating?: 5-10   Does patient use an assistive device?: Yes   Assistive Devices: brace   Rehab Notes:        SW/Discharge Planning  Appointment Details-    Goals of Care: : Pt motivated to regain function and independence   Caregiver support:    Additional caregiver needs:    Anticipated

## 2024-05-03 ENCOUNTER — TELEPHONE (OUTPATIENT)
Dept: ORTHOPEDIC SURGERY | Age: 84
End: 2024-05-03

## 2024-05-03 NOTE — TELEPHONE ENCOUNTER
Spoke to a nurse, David, at [place] campus. She states that the patient's Aquacell dressing is quite saturated. Advised that while I would be happy to provide with a new dressing, our office closes at 4 pm today and there is no provider in office if there is a problem. Advised David that if the drainage continues or if there is concern, the patient should be taken to the ER.

## 2024-05-05 NOTE — DISCHARGE INSTRUCTIONS
[facility]                            Visit  Discharge Instructions / Physician Orders  DATE:5/10/24     Home Care:   [facility]  CBC SEDRATE CRP prior to next visit at wound care in 2 weeks     SUPPLIES ORDERED THRU:                     DATE LAST SUPPLIED     Wound Location:  Left Knee Incision     Cleanse with: Liquid antibacterial soap and water, rinse well      Dressing Orders: Althea, ABD, mefix tape.     Frequency: change daily.     Additional Orders: Increase protein to diet (meat, cheese, eggs, fish, peanut butter, nuts and beans)  Multivitamin daily  If wound continues to bleed send patient to ER( as per instructions of  Office)  OFFLOADING [] YES  TYPE:                  [x] NA     Weekly wound care visits until determined otherwise.     Antibiotic therapy-wound care related YES [x] NO [] NA[]     Start Doxycycline 100 mg 2x per day for  2 more weeks  ID nurse practioner to manage Urine culture - to prescribe antibiotic as needed  MY CHART []     Smart Device  []      HYPERBARIC TREATMENT-                TREATMENT #                          Your next appointment with the Wound Care Center is in 2 week                                                                                                   (Please note your next appointment above and if you are unable to keep, kindly give a 24 hour notice. Thank you.)  If more than 15 min late we cannot guarantee you will be seen due to clinician schedule  Per Policy, Excessive cancellation will call for dismissal from program.  If you experience any of the following, please call the Wound Care Center during business hours:  [phone]     * Increase in Pain  * Temperature over 101  * Increase in drainage from your wound  * Drainage with a foul odor  * Bleeding  * Increase in swelling  * Need for compression bandage changes due to slippage, breakthrough drainage.     If you need medical attention outside of the business

## 2024-05-07 ENCOUNTER — OUTSIDE SERVICES (OUTPATIENT)
Dept: INFECTIOUS DISEASES | Age: 84
End: 2024-05-07
Payer: MEDICARE

## 2024-05-07 VITALS
DIASTOLIC BLOOD PRESSURE: 68 MMHG | SYSTOLIC BLOOD PRESSURE: 122 MMHG | OXYGEN SATURATION: 97 % | RESPIRATION RATE: 18 BRPM | HEART RATE: 72 BPM | TEMPERATURE: 97.8 F

## 2024-05-07 DIAGNOSIS — R79.82 ELEVATED C-REACTIVE PROTEIN (CRP): ICD-10-CM

## 2024-05-07 DIAGNOSIS — Z88.1 ALLERGY TO MULTIPLE ANTIBIOTICS: ICD-10-CM

## 2024-05-07 DIAGNOSIS — T81.49XA METHICILLIN-RESISTANT STAPHYLOCOCCUS AUREUS INFECTION OF POSTOPERATIVE WOUND: Primary | ICD-10-CM

## 2024-05-07 DIAGNOSIS — B95.62 METHICILLIN-RESISTANT STAPHYLOCOCCUS AUREUS INFECTION OF POSTOPERATIVE WOUND: Primary | ICD-10-CM

## 2024-05-07 DIAGNOSIS — D72.10 EOSINOPHILIA, UNSPECIFIED TYPE: ICD-10-CM

## 2024-05-07 PROCEDURE — 99309 SBSQ NF CARE MODERATE MDM 30: CPT | Performed by: NURSE PRACTITIONER

## 2024-05-07 ASSESSMENT — ENCOUNTER SYMPTOMS: COUGH: 0

## 2024-05-07 NOTE — PROGRESS NOTES
[facility] -   Infectious diseases evaluation  admission date 4/6/2024    reason for consultation:   Antibiotic Management    Impression :   Current:  Left  knee postoperative wound infection /cellulitis  3/8    S/p I & D with application of Vancomycin powder and wound vac.  Hardware was retained.  S/p I & D left knee wound, repair of quadriceps tendon, wound lavavge and closure 4/2/24.  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 Resolved.  Allergies to multiple antibiotics.  Chronic reeves catheter/changed 3/6/24.    Other:    Discussion / summary of stay / plan of care/ Recommendations:   Completed 6-week course of Vancomycin 4/24/24.  HENCE:   Doxycycline 100 mg po twice daily 4/25-5/25/2024.  Follow CBC and renal function closely.  Remove reeves.  Patient is to stright cath every 4-6 hours as at home.  Wound care.  Supportive care.  Discussed with patient.    Infection Control Recommendations   [place] Precautions  Contact Isolation     Antimicrobial Stewardship Recommendations   Simplification of therapy  Targeted therapy    History of Present Illness:   Initial history:  Komal Crawford is a 83 y.o.-year-old female s/p left total knee arthroplasty revision on 1/23/24 who presented to [place] on 3/6/24 with left knee incision wound with swelling, warmth and pain with associated fever of 102.6.  Patient underwent I & D of the left knee, application of Vancomycin powder and wound vac placement on 3/8/24.  Intra-operative wound culture grew MRSA.  Treated with Vancomycin IV while at [place]. Discharged to [facility] on 3/12/24 with plan for Vancomycin IV through 4/24/24.    Interval changes 5/7/2024   Sitting up in a wheelchair.  Feeling good.  Dressing to left knee.  No strikethrough.  Minimal swelling.  Denies any fever, chills, n/v/d.  Asking to have reeves removed and try straight

## 2024-05-08 ENCOUNTER — TELEPHONE (OUTPATIENT)
Dept: ORTHOPEDIC SURGERY | Age: 84
End: 2024-05-08

## 2024-05-08 ENCOUNTER — HOSPITAL ENCOUNTER (OUTPATIENT)
Age: 84
Setting detail: SPECIMEN
Discharge: HOME | End: 2024-05-08

## 2024-05-08 ENCOUNTER — CARE COORDINATION (OUTPATIENT)
Dept: CARE COORDINATION | Age: 84
End: 2024-05-08

## 2024-05-08 LAB
ANION GAP SERPL CALCULATED.3IONS-SCNC: 11 MMOL/L (ref 9–16)
BUN SERPL-MCNC: 25 MG/DL (ref 8–23)
CALCIUM SERPL-MCNC: 8.8 MG/DL (ref 8.6–10.4)
CHLORIDE SERPL-SCNC: 102 MMOL/L (ref 98–107)
CO2 SERPL-SCNC: 23 MMOL/L (ref 20–31)
CREAT SERPL-MCNC: 0.9 MG/DL (ref 0.5–0.9)
ERYTHROCYTE [DISTWIDTH] IN BLOOD BY AUTOMATED COUNT: 16.8 % (ref 11.8–14.4)
GFR, ESTIMATED: 64 ML/MIN/1.73M2
GLUCOSE SERPL-MCNC: 98 MG/DL (ref 74–99)
HCT VFR BLD AUTO: 30.3 % (ref 36.3–47.1)
HGB BLD-MCNC: 9.1 G/DL (ref 11.9–15.1)
MCH RBC QN AUTO: 27.2 PG (ref 25.2–33.5)
MCHC RBC AUTO-ENTMCNC: 30 G/DL (ref 28.4–34.8)
MCV RBC AUTO: 90.7 FL (ref 82.6–102.9)
NRBC BLD-RTO: 0 PER 100 WBC
PLATELET # BLD AUTO: ABNORMAL K/UL (ref 138–453)
PLATELET, FLUORESCENCE: 207 K/UL (ref 138–453)
PLATELETS.RETICULATED NFR BLD AUTO: 18.8 % (ref 1.1–10.3)
POTASSIUM SERPL-SCNC: 4.1 MMOL/L (ref 3.7–5.3)
RBC # BLD AUTO: 3.34 M/UL (ref 3.95–5.11)
SODIUM SERPL-SCNC: 136 MMOL/L (ref 136–145)
WBC OTHER # BLD: 7.9 K/UL (ref 3.5–11.3)

## 2024-05-08 PROCEDURE — 85027 COMPLETE CBC AUTOMATED: CPT

## 2024-05-08 PROCEDURE — 85055 RETICULATED PLATELET ASSAY: CPT

## 2024-05-08 PROCEDURE — 36415 COLL VENOUS BLD VENIPUNCTURE: CPT

## 2024-05-08 PROCEDURE — P9603 ONE-WAY ALLOW PRORATED MILES: HCPCS

## 2024-05-08 PROCEDURE — 80048 BASIC METABOLIC PNL TOTAL CA: CPT

## 2024-05-08 NOTE — CARE COORDINATION
Care Transitions Post-Acute Facility Update Call    2024    Patient: Komal Crawford  Patient : 1940   MRN: [id number]  Reason for Admission: Status post irrigation debridement infected left total knee with quadriceps tendon repair  Discharge Date: 24 RARS: Readmission Risk Score: 32.3    Care Transitions Post Acute Facility Update    Care Transitions Interventions    Physical Therapy: Completed          Post Acute Facility Update   Post Acute Facility: [facility]          Nursing  VS /73 R 17 T 97.6 BS 71 P 72 O2 97% PAIN 0 WEIGHT 162.5LBS     Hinged brace (Don Erika locked in extension) on at all times, activity as tolerated. AS OF FOLLOW UP WITH ORTHO DR. HARRIS ON 2024 WBAT LEFT LE WITH WALKER AND BRACE WITH FOLLOW UP IN 1 WEEK. Fall risk FULL CODE Wound vac LLE    NEXT WEEK WEDNESDAY 5/15     Prescribed diet: .    CCD diet, Regular texture, Thin consistency increase protein to diet for Diet       Nutrition intake assessment: .   Skilled Medication therapies: PT / OT    Medical equipment being used: BRACE POST OP   Disease specific clinical considerations: .   Reported Nursing Updates:     Hoping today after f/u with surgeon will have new orders for her to shower.    Rehab/Functional - IMPROVING     OT - SELF CATHING  UPPPER SET UP LOWER SBA  KNEE BRACE HINDER HER ABILITY TO DRESS HERSELF  BED - MIN TO SBA   MIN FOR TRANSFER    PT - TRANSFER NEEDS HELP WITH LEFT LEG, HIP STRENGTHENING  WALKING 10-17 FT ONE TIME WITH SITTED REST BREAKS, NEEDS 50% REMINDERS TO SLIDE LEFT LEG OUT     Prior Level of Functioning: .   Cognitive function assessment: .  Activities of Daily Living ADL Feeding: Setup   Grooming: Setup   Late loss ADL: Extensive assist of 1 in bed mobility. Did not transfer during this shift. Extensive assist of 1 in toilet use.     PATIENT CONTINUES TO MAKE GAIN WITH INCREASED LE MUSCLE STRENGTH/AROM WITH (B) LE'S FOR IMPROVED TRANSFERS, STANDING BALANCE, AND GAIT TASKS WITH

## 2024-05-08 NOTE — TELEPHONE ENCOUNTER
DENNIS Spoke to Marie at the nursing home where pt is at.  She wanted to let us know that the pt is actively bleeding and they have reinforced the aqua cell as instructed.  Pt also has an appt with Dr. Arteaga on Friday the 10th where I am sure they will remove the dressing and take care of her wound.

## 2024-05-10 ENCOUNTER — HOSPITAL ENCOUNTER (OUTPATIENT)
Dept: WOUND CARE | Age: 84
Discharge: HOME | End: 2024-05-10
Payer: MEDICARE

## 2024-05-10 VITALS
WEIGHT: 166 LBS | HEART RATE: 87 BPM | BODY MASS INDEX: 34.69 KG/M2 | RESPIRATION RATE: 16 BRPM | TEMPERATURE: 97.4 F | SYSTOLIC BLOOD PRESSURE: 137 MMHG | DIASTOLIC BLOOD PRESSURE: 70 MMHG

## 2024-05-10 DIAGNOSIS — S81.002D OPEN WOUND OF LEFT KNEE WITH COMPLICATION, SUBSEQUENT ENCOUNTER: ICD-10-CM

## 2024-05-10 DIAGNOSIS — S81.002A OPEN WOUND OF LEFT KNEE, INITIAL ENCOUNTER: Primary | ICD-10-CM

## 2024-05-10 PROCEDURE — 11042 DBRDMT SUBQ TIS 1ST 20SQCM/<: CPT

## 2024-05-10 PROCEDURE — 99214 OFFICE O/P EST MOD 30 MIN: CPT | Performed by: INTERNAL MEDICINE

## 2024-05-10 PROCEDURE — 11042 DBRDMT SUBQ TIS 1ST 20SQCM/<: CPT | Performed by: INTERNAL MEDICINE

## 2024-05-10 RX ORDER — CLOBETASOL PROPIONATE 0.5 MG/G
OINTMENT TOPICAL ONCE
OUTPATIENT
Start: 2024-05-10 | End: 2024-05-10

## 2024-05-10 RX ORDER — IBUPROFEN 200 MG
TABLET ORAL ONCE
OUTPATIENT
Start: 2024-05-10 | End: 2024-05-10

## 2024-05-10 RX ORDER — LIDOCAINE 50 MG/G
OINTMENT TOPICAL ONCE
OUTPATIENT
Start: 2024-05-10 | End: 2024-05-10

## 2024-05-10 RX ORDER — DOXYCYCLINE HYCLATE 100 MG/1
100 CAPSULE ORAL 2 TIMES DAILY
COMMUNITY
Start: 2024-04-25 | End: 2024-05-25

## 2024-05-10 RX ORDER — LIDOCAINE HYDROCHLORIDE 20 MG/ML
JELLY TOPICAL ONCE
OUTPATIENT
Start: 2024-05-10 | End: 2024-05-10

## 2024-05-10 RX ORDER — GINSENG 100 MG
CAPSULE ORAL ONCE
OUTPATIENT
Start: 2024-05-10 | End: 2024-05-10

## 2024-05-10 RX ORDER — GENTAMICIN SULFATE 1 MG/G
OINTMENT TOPICAL ONCE
OUTPATIENT
Start: 2024-05-10 | End: 2024-05-10

## 2024-05-10 RX ORDER — LIDOCAINE HYDROCHLORIDE 40 MG/ML
SOLUTION TOPICAL ONCE
OUTPATIENT
Start: 2024-05-10 | End: 2024-05-10

## 2024-05-10 RX ORDER — BACITRACIN ZINC AND POLYMYXIN B SULFATE 500; 1000 [USP'U]/G; [USP'U]/G
OINTMENT TOPICAL ONCE
OUTPATIENT
Start: 2024-05-10 | End: 2024-05-10

## 2024-05-10 RX ORDER — TRIAMCINOLONE ACETONIDE 1 MG/G
OINTMENT TOPICAL ONCE
OUTPATIENT
Start: 2024-05-10 | End: 2024-05-10

## 2024-05-10 RX ORDER — LIDOCAINE 40 MG/G
CREAM TOPICAL ONCE
OUTPATIENT
Start: 2024-05-10 | End: 2024-05-10

## 2024-05-10 RX ORDER — LIDOCAINE HYDROCHLORIDE 40 MG/ML
SOLUTION TOPICAL ONCE
Status: COMPLETED | OUTPATIENT
Start: 2024-05-10 | End: 2024-05-10

## 2024-05-10 RX ORDER — BETAMETHASONE DIPROPIONATE 0.5 MG/G
CREAM TOPICAL ONCE
OUTPATIENT
Start: 2024-05-10 | End: 2024-05-10

## 2024-05-10 RX ORDER — SODIUM CHLOR/HYPOCHLOROUS ACID 0.033 %
SOLUTION, IRRIGATION IRRIGATION ONCE
OUTPATIENT
Start: 2024-05-10 | End: 2024-05-10

## 2024-05-10 RX ADMIN — LIDOCAINE HYDROCHLORIDE 5 ML: 40 SOLUTION TOPICAL at 08:29

## 2024-05-10 ASSESSMENT — PAIN SCALES - GENERAL: PAINLEVEL_OUTOF10: 0

## 2024-05-10 NOTE — PROGRESS NOTES
[facility]   Progress Note and Procedure Note      Komal Crawford  MEDICAL RECORD NUMBER:  [id number]  AGE: 83 y.o.   GENDER: female  : 1940  EPISODE DATE:  5/10/2024    Subjective:     Chief Complaint   Patient presents with    Wound Check     Left knee         HISTORY of PRESENT ILLNESS HPI     Komal Crawford is a 83 y.o. female who presents today for wound/ulcer evaluation.   History of Wound Context: The patient is here for left knee incision with small open wound to the mid incision.  No significant bleeding today or purulent drainage.  Winters catheter was removed yesterday, has been doing straight cath, complaining of dysuria.  She denied significant pain, no fever or chills, no new complaints.  She completed IV vancomycin course on 2024, PICC line was removed.  She has been on oral doxycycline that she is tolerating    The patient was hospitalized 2024 through 2024 S/p I & D left knee wound, repair of quadriceps tendon, wound lavavge and closure 24     The patient was hospitalized previously at [facility] 3/6/2024  through 3/12/2024 for left knee wound and TKA infection status post irrigation and debridement with application of vancomycin powder and wound VAC with hardware retention.  The patient was discharged on IV vancomycin through 2024  She received a course of meropenem 3/6/2024 through 3/11/2024 for UTI      Ulcer Identification:  Ulcer Type: non-healing surgical    Contributing Factors: diabetes and decreased mobility    Status post left total hip arthroplasty/displaced periprosthetic femur fracture status post left total knee arthroplasty revision 24   Cephalosporin, sulfa penicillin, Cipro, doxycycline and clindamycin allergy   PAST MEDICAL HISTORY        Diagnosis Date    Acquired absence of both cervix and uterus     Acquired absence of other organs     Age-related cognitive decline     Age-related osteoporosis without current

## 2024-05-15 ENCOUNTER — CARE COORDINATION (OUTPATIENT)
Dept: CARE COORDINATION | Age: 84
End: 2024-05-15

## 2024-05-15 ENCOUNTER — HOSPITAL ENCOUNTER (OUTPATIENT)
Age: 84
Setting detail: SPECIMEN
Discharge: HOME | End: 2024-05-15

## 2024-05-15 ENCOUNTER — OFFICE VISIT (OUTPATIENT)
Dept: ORTHOPEDIC SURGERY | Age: 84
End: 2024-05-15

## 2024-05-15 DIAGNOSIS — Z96.652 HISTORY OF TOTAL KNEE ARTHROPLASTY, LEFT: Primary | ICD-10-CM

## 2024-05-15 LAB
ANION GAP SERPL CALCULATED.3IONS-SCNC: 10 MMOL/L (ref 9–16)
BUN SERPL-MCNC: 25 MG/DL (ref 8–23)
CALCIUM SERPL-MCNC: 9 MG/DL (ref 8.6–10.4)
CHLORIDE SERPL-SCNC: 104 MMOL/L (ref 98–107)
CO2 SERPL-SCNC: 20 MMOL/L (ref 20–31)
CREAT SERPL-MCNC: 0.8 MG/DL (ref 0.5–0.9)
ERYTHROCYTE [DISTWIDTH] IN BLOOD BY AUTOMATED COUNT: 17.2 % (ref 11.8–14.4)
GFR, ESTIMATED: 75 ML/MIN/1.73M2
GLUCOSE SERPL-MCNC: 91 MG/DL (ref 74–99)
HCT VFR BLD AUTO: 30 % (ref 36.3–47.1)
HGB BLD-MCNC: 9 G/DL (ref 11.9–15.1)
MCH RBC QN AUTO: 27.3 PG (ref 25.2–33.5)
MCHC RBC AUTO-ENTMCNC: 30 G/DL (ref 28.4–34.8)
MCV RBC AUTO: 90.9 FL (ref 82.6–102.9)
NRBC BLD-RTO: 0 PER 100 WBC
PLATELET # BLD AUTO: ABNORMAL K/UL (ref 138–453)
PLATELET, FLUORESCENCE: 173 K/UL (ref 138–453)
PLATELETS.RETICULATED NFR BLD AUTO: 16.6 % (ref 1.1–10.3)
POTASSIUM SERPL-SCNC: 4.3 MMOL/L (ref 3.7–5.3)
RBC # BLD AUTO: 3.3 M/UL (ref 3.95–5.11)
SODIUM SERPL-SCNC: 134 MMOL/L (ref 136–145)
WBC OTHER # BLD: 7.7 K/UL (ref 3.5–11.3)

## 2024-05-15 PROCEDURE — 99024 POSTOP FOLLOW-UP VISIT: CPT | Performed by: ORTHOPAEDIC SURGERY

## 2024-05-15 PROCEDURE — 85027 COMPLETE CBC AUTOMATED: CPT

## 2024-05-15 PROCEDURE — 36415 COLL VENOUS BLD VENIPUNCTURE: CPT

## 2024-05-15 PROCEDURE — 80048 BASIC METABOLIC PNL TOTAL CA: CPT

## 2024-05-15 PROCEDURE — P9603 ONE-WAY ALLOW PRORATED MILES: HCPCS

## 2024-05-15 PROCEDURE — 85055 RETICULATED PLATELET ASSAY: CPT

## 2024-05-15 NOTE — CARE COORDINATION
Care Transitions Post-Acute Facility Update Call    5/15/2024    Patient: Komal Crawford  Patient : 1940   MRN: [id number]  Reason for Admission: Status post irrigation debridement infected left total knee with quadriceps tendon repair  Discharge Date: 24 RARS: Readmission Risk Score: 32.3    Care Transitions Post Acute Facility Update    Care Transitions Interventions    Physical Therapy: Completed          Post Acute Facility Update   Post Acute Facility: [facility]  Mental Status: LOC: Alert and responsive. Speech is clear. Short term memory OK; long term memory ok. Made appropriate decisions regarding tasks of daily life.  Cardiac: /75 - 5/15/2024 00:30 Position: Lying l/arm P 72 - 5/15/2024 00:30 Pulse Type: Regular  Temp: T 98.2 - 5/15/2024 00:30 Route: Forehead (non-contact)  Respiratory: R 18.0 - 5/15/2024 00:30 , regular. Lung sounds clear. O2 96.0 % - 5/15/2024 00:30 Method: Room Air  Health Status: None of the above indicators, None of the above indicators, None of the above indicators, None  Food/fluid intake: No meal on this shift.  Output: Bladder: Continent Bowel: Continent BM on 2024.  Mood issues: No  Behavior issues: No  Pain Marie-Baker FACES pain scale 0, no hurt.  Late loss ADL: Extensive assist of 1 in bed mobility. Did not transfer during this shift. Extensive assist of 1 in toilet use.  Skin condition/skin color: No skin issues, WNL     AS OF FOLLOW UP WITH ORTHO DR. HARRIS pt can now unlock hinged brace 0-40 degrees  CGA tx level        Prescribed diet: .    CCD diet, Regular texture, Thin consistency increase protein to diet for Diet       Nutrition intake assessment: .   Skilled Medication therapies: PT / OT    Medical equipment being used: BRACE POST OP   Disease specific clinical considerations: .   Reported Nursing Updates:     Hoping today after f/u with surgeon will have new orders for her to shower.    Rehab/Functional - IMPROVING     OT - SELF

## 2024-05-15 NOTE — PROGRESS NOTES
Komal returns today status post I&D and of revision left total knee arthroplasty.  She has subsequent quadriceps repair with continued IV antibiotics.  Patient is remaining at the nursing home still and going to wound care on a weekly basis    Examination notes the wound is much improved she just has 1 small area where she has some granulation tissue remaining.  The redness is down significantly.  No active signs of infection at this time.    No new x-rays taken today    Impression status post irrigation debridement with wound closure and quadriceps repair    Plan  Patient continue antibiotics will allow her brace to be open 0 to 40 degrees orders were written for for her nursing facility will see her back in 4 weeks continue with wound care as well.

## 2024-05-16 ENCOUNTER — TELEPHONE (OUTPATIENT)
Age: 84
End: 2024-05-16

## 2024-05-16 ENCOUNTER — OUTSIDE SERVICES (OUTPATIENT)
Dept: INFECTIOUS DISEASES | Age: 84
End: 2024-05-16
Payer: MEDICARE

## 2024-05-16 DIAGNOSIS — Z88.1 ALLERGY TO MULTIPLE ANTIBIOTICS: ICD-10-CM

## 2024-05-16 DIAGNOSIS — B95.62 METHICILLIN-RESISTANT STAPHYLOCOCCUS AUREUS INFECTION OF POSTOPERATIVE WOUND: Primary | ICD-10-CM

## 2024-05-16 DIAGNOSIS — R79.82 ELEVATED C-REACTIVE PROTEIN (CRP): ICD-10-CM

## 2024-05-16 DIAGNOSIS — T81.49XA METHICILLIN-RESISTANT STAPHYLOCOCCUS AUREUS INFECTION OF POSTOPERATIVE WOUND: Primary | ICD-10-CM

## 2024-05-16 PROCEDURE — 99239 HOSP IP/OBS DSCHRG MGMT >30: CPT | Performed by: NURSE PRACTITIONER

## 2024-05-16 NOTE — TELEPHONE ENCOUNTER
PT CALLED TO ASK IF YOU WOULD PLACE AN SP TUBE FOR HER- SHE HAS BEEN IN REHAB FOR 4 MONTHS AFTER SHATTERING HER HIP AND LEG- SHE HAS A BRACE TO HER UPPER THIGH- SHE HAD A ALBA IN BUT IT WAS JUST TOO PAINFUL SO SHE HAS BEEN SELF CATHING BUT IT IS VERY DIFFICULT BECAUSE SHE CAN'T STAND UP-PLEASE ADVISE-ADEN

## 2024-05-20 VITALS
RESPIRATION RATE: 18 BRPM | DIASTOLIC BLOOD PRESSURE: 68 MMHG | SYSTOLIC BLOOD PRESSURE: 128 MMHG | OXYGEN SATURATION: 94 % | HEART RATE: 70 BPM | TEMPERATURE: 97.3 F

## 2024-05-20 ASSESSMENT — ENCOUNTER SYMPTOMS: COUGH: 0

## 2024-05-20 NOTE — PROGRESS NOTES
[facility] -   Infectious diseases evaluation  admission date 4/6/2024    reason for consultation:   Antibiotic Management    Impression :   Current:  Left  knee postoperative wound infection /cellulitis  3/8    S/p I & D with application of Vancomycin powder and wound vac.  Hardware was retained.  S/p I & D left knee wound, repair of quadriceps tendon, wound lavavge and closure 4/2/24.  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 Resolved.  Allergies to multiple antibiotics.  Chronic reeves catheter/changed 3/6/24.    Other:    Discussion / summary of stay / plan of care/ Recommendations:   Completed 6-week course of Vancomycin 4/24/24.  HENCE:   Doxycycline 100 mg po twice daily 4/25-5/25/2024.  Follow CBC and renal function closely.  Wound care.  Supportive care.  Discussed with patient.    Infection Control Recommendations   [place] Precautions  Contact Isolation     Antimicrobial Stewardship Recommendations   Simplification of therapy  Targeted therapy    History of Present Illness:   Initial history:  Komal Crawford is a 83 y.o.-year-old female s/p left total knee arthroplasty revision on 1/23/24 who presented to [place] on 3/6/24 with left knee incision wound with swelling, warmth and pain with associated fever of 102.6.  Patient underwent I & D of the left knee, application of Vancomycin powder and wound vac placement on 3/8/24.  Intra-operative wound culture grew MRSA.  Treated with Vancomycin IV while at [place]. Discharged to [facility] on 3/12/24 with plan for Vancomycin IV through 4/24/24.    Interval changes 5/16/2024   Sitting up in a wheelchair.  Feeling good.  Dressing to left knee.  No strikethrough.  Minimal swelling.  Denies any fever, chills, n/v/d.  Reeves removed.  Self straight catherization without difficulty.        Summary of relevant labs:  Labs:  WBC:

## 2024-05-22 ENCOUNTER — CARE COORDINATION (OUTPATIENT)
Dept: CARE COORDINATION | Age: 84
End: 2024-05-22

## 2024-05-22 ENCOUNTER — HOSPITAL ENCOUNTER (OUTPATIENT)
Age: 84
Setting detail: SPECIMEN
Discharge: HOME | End: 2024-05-22

## 2024-05-22 LAB
ANION GAP SERPL CALCULATED.3IONS-SCNC: 12 MMOL/L (ref 9–16)
BUN SERPL-MCNC: 25 MG/DL (ref 8–23)
CALCIUM SERPL-MCNC: 8.9 MG/DL (ref 8.6–10.4)
CHLORIDE SERPL-SCNC: 102 MMOL/L (ref 98–107)
CO2 SERPL-SCNC: 21 MMOL/L (ref 20–31)
CREAT SERPL-MCNC: 0.9 MG/DL (ref 0.5–0.9)
ERYTHROCYTE [DISTWIDTH] IN BLOOD BY AUTOMATED COUNT: 17.2 % (ref 11.8–14.4)
GFR, ESTIMATED: 61 ML/MIN/1.73M2
GLUCOSE SERPL-MCNC: 103 MG/DL (ref 74–99)
HCT VFR BLD AUTO: 31.7 % (ref 36.3–47.1)
HGB BLD-MCNC: 9.6 G/DL (ref 11.9–15.1)
MCH RBC QN AUTO: 27.3 PG (ref 25.2–33.5)
MCHC RBC AUTO-ENTMCNC: 30.3 G/DL (ref 28.4–34.8)
MCV RBC AUTO: 90.1 FL (ref 82.6–102.9)
NRBC BLD-RTO: 0 PER 100 WBC
PLATELET # BLD AUTO: ABNORMAL K/UL (ref 138–453)
PLATELET, FLUORESCENCE: 198 K/UL (ref 138–453)
PLATELETS.RETICULATED NFR BLD AUTO: 20.3 % (ref 1.1–10.3)
POTASSIUM SERPL-SCNC: 4.2 MMOL/L (ref 3.7–5.3)
RBC # BLD AUTO: 3.52 M/UL (ref 3.95–5.11)
SODIUM SERPL-SCNC: 135 MMOL/L (ref 136–145)
WBC OTHER # BLD: 5.9 K/UL (ref 3.5–11.3)

## 2024-05-22 PROCEDURE — 80048 BASIC METABOLIC PNL TOTAL CA: CPT

## 2024-05-22 PROCEDURE — 36415 COLL VENOUS BLD VENIPUNCTURE: CPT

## 2024-05-22 PROCEDURE — P9603 ONE-WAY ALLOW PRORATED MILES: HCPCS

## 2024-05-22 PROCEDURE — 85055 RETICULATED PLATELET ASSAY: CPT

## 2024-05-22 PROCEDURE — 85027 COMPLETE CBC AUTOMATED: CPT

## 2024-05-22 NOTE — CARE COORDINATION
Care Transitions Post-Acute Facility Update Call    2024    Patient: Komal Crawford  Patient : 1940   MRN: [id number]  Reason for Admission: Status post irrigation debridement infected left total knee with quadriceps tendon repair  Discharge Date: 24 RARS: Readmission Risk Score: 32.3    Care Transitions Post Acute Facility Update    Care Transitions Interventions    Physical Therapy: Completed          Post Acute Facility Update   Post Acute Facility: [facility]  Mental Status: LOC: Alert and responsive. Speech is clear. Short term memory OK; long term memory ok. Made appropriate decisions regarding tasks of daily life.    VS /66 P 82 R 18 T 98.2 SP02 97% PAIN 0 .9     AS OF FOLLOW UP WITH ORTHO DR. HARRIS pt can now unlock hinged brace 0-40 degrees  CGA tx level     UB ADL-- set up  LB ADL -- SBA with use of AE, mod A to don socks and built up shoes, max to mod A to adjust hinged knee brace  Hygiene -- set up (oral and hair)  Toileting -- min A to CGA for transfer and clothing mgmt but distant supervision for hygiene (self cath's)    Bed mobility- Supervision for supine to sit and sit to supine min assist  Transfers -- Min A from bed (softer surface), CGA from harder surfaces    Tolerating strengthening exercises with 2lb weights and [id number]     Sit to stands min assist.  ambulation 40-45 ft with CGA with RW.  balance CGA  stairs not addressed yet but will be this week due to brace just unlocked to 0-40 degrees.     Prescribed diet: .    CCD diet, Regular texture, Thin consistency increase protein to diet for Diet       Nutrition intake assessment: .   Skilled Medication therapies: PT / OT    Medical equipment being used: BRACE POST OP   Disease specific clinical considerations: .   Reported Nursing Updates:     Hoping today after f/u with surgeon will have new orders for her to shower.    Rehab/Functional - IMPROVING     OT - SELF CATHING  UPPPER SET UP LOWER

## 2024-05-28 NOTE — DISCHARGE INSTRUCTIONS
[facility]                            Visit  Discharge Instructions / Physician Orders  DATE:5/31/24     Home Care:   [facility]  CBC SEDRATE CRP  today send results to wound care [phone] and then draw 6/13/24 send results to wound care for appointment on 6/14/24  SUPPLIES ORDERED THRU:                     DATE LAST SUPPLIED     Wound Location:  Left Knee Incision     Cleanse with: Liquid antibacterial soap and water, rinse well      Dressing Orders: Althea, ABD, mefix tape.     Frequency: change daily.     Additional Orders: Increase protein to diet (meat, cheese, eggs, fish, peanut butter, nuts and beans)  Multivitamin daily  If wound continues to bleed send patient to ER( as per instructions of  Office)  OFFLOADING [] YES  TYPE:                  [x] NA     Weekly wound care visits until determined otherwise.     Antibiotic therapy-wound care related YES [x] NO [] NA[]     Start Doxycycline 100 mg 2x per day for  2 more weeks as of 5/31/24  ID nurse practioner to manage Urine culture - to prescribe antibiotic as needed  MY CHART []     Smart Device  []      HYPERBARIC TREATMENT-                TREATMENT #                          Your next appointment with the Wound Care Center is in 2 week                                                                                                   (Please note your next appointment above and if you are unable to keep, kindly give a 24 hour notice. Thank you.)  If more than 15 min late we cannot guarantee you will be seen due to clinician schedule  Per Policy, Excessive cancellation will call for dismissal from program.  If you experience any of the following, please call the Wound Care Center during business hours:  [phone]     * Increase in Pain  * Temperature over 101  * Increase in drainage from your wound  * Drainage with a foul odor  * Bleeding  * Increase in swelling  * Need for compression bandage changes due to

## 2024-05-29 ENCOUNTER — CARE COORDINATION (OUTPATIENT)
Dept: CARE COORDINATION | Age: 84
End: 2024-05-29

## 2024-05-29 ENCOUNTER — HOSPITAL ENCOUNTER (OUTPATIENT)
Age: 84
Setting detail: SPECIMEN
Discharge: HOME | End: 2024-05-29

## 2024-05-29 LAB
ANION GAP SERPL CALCULATED.3IONS-SCNC: 11 MMOL/L (ref 9–16)
BUN SERPL-MCNC: 19 MG/DL (ref 8–23)
CALCIUM SERPL-MCNC: 9.3 MG/DL (ref 8.6–10.4)
CHLORIDE SERPL-SCNC: 102 MMOL/L (ref 98–107)
CO2 SERPL-SCNC: 23 MMOL/L (ref 20–31)
CREAT SERPL-MCNC: 0.7 MG/DL (ref 0.5–0.9)
ERYTHROCYTE [DISTWIDTH] IN BLOOD BY AUTOMATED COUNT: 18.1 % (ref 11.8–14.4)
GFR, ESTIMATED: 82 ML/MIN/1.73M2
GLUCOSE SERPL-MCNC: 116 MG/DL (ref 74–99)
HCT VFR BLD AUTO: 33.3 % (ref 36.3–47.1)
HGB BLD-MCNC: 10.1 G/DL (ref 11.9–15.1)
MCH RBC QN AUTO: 27.4 PG (ref 25.2–33.5)
MCHC RBC AUTO-ENTMCNC: 30.3 G/DL (ref 28.4–34.8)
MCV RBC AUTO: 90.5 FL (ref 82.6–102.9)
NRBC BLD-RTO: 0 PER 100 WBC
PLATELET # BLD AUTO: ABNORMAL K/UL (ref 138–453)
PLATELET, FLUORESCENCE: 195 K/UL (ref 138–453)
PLATELETS.RETICULATED NFR BLD AUTO: 17.9 % (ref 1.1–10.3)
POTASSIUM SERPL-SCNC: 4.3 MMOL/L (ref 3.7–5.3)
RBC # BLD AUTO: 3.68 M/UL (ref 3.95–5.11)
SODIUM SERPL-SCNC: 136 MMOL/L (ref 136–145)
WBC OTHER # BLD: 7 K/UL (ref 3.5–11.3)

## 2024-05-29 PROCEDURE — P9603 ONE-WAY ALLOW PRORATED MILES: HCPCS

## 2024-05-29 PROCEDURE — 36415 COLL VENOUS BLD VENIPUNCTURE: CPT

## 2024-05-29 PROCEDURE — 85027 COMPLETE CBC AUTOMATED: CPT

## 2024-05-29 PROCEDURE — 85055 RETICULATED PLATELET ASSAY: CPT

## 2024-05-29 PROCEDURE — 80048 BASIC METABOLIC PNL TOTAL CA: CPT

## 2024-05-30 NOTE — CARE COORDINATION
Disease specific clinical considerations: .   Reported Nursing Updates:     Hoping today after f/u with surgeon will have new orders for her to shower.    Rehab/Functional - IMPROVING     OT - SELF CATHING 5/7 UPPPER SET UP LOWER SBA  KNEE BRACE HINDER HER ABILITY TO DRESS HERSELF  BED - MIN TO SBA   MIN FOR TRANSFER    PT - TRANSFER NEEDS HELP WITH LEFT LEG, HIP STRENGTHENING  WALKING 10-17 FT ONE TIME WITH SITTED REST BREAKS, NEEDS 50% REMINDERS TO SLIDE LEFT LEG OUT     Prior Level of Functioning: .   Cognitive function assessment: .  Activities of Daily Living ADL Feeding: Setup   Grooming: Setup   Late loss ADL: Extensive assist of 1 in bed mobility. Did not transfer during this shift. Extensive assist of 1 in toilet use.     PATIENT CONTINUES TO MAKE GAIN WITH INCREASED LE MUSCLE STRENGTH/AROM WITH (B) LE'S FOR IMPROVED TRANSFERS, STANDING BALANCE, AND GAIT TASKS WITH NEED FOR (B) UE SUPPORT OF FWW TO INCREASE LE WT SHIFTING AND FOOT CLEARANCE FOR DECREASED FALL RISK AND INCREASED SAFETY AND MOBILTY.                How far (in feet) is the patient ambulating?: 5-10   Does patient use an assistive device?: Yes   Assistive Devices: brace   Rehab Notes:        SW/Discharge Planning  Appointment Details-    Goals of Care: : Pt motivated to regain function and independence   Caregiver support:    Additional caregiver needs:    Anticipated date for discharge:   Discharge Planning / Barriers: 5/23 POTENTIALLY NEED ANOTHER WEEK  HOME IS GOAL   IS BLIND so patient needs to be fully independent  OCCASIONAL FRIEND SUPPORT      Care Progression on Track?: Pos  Next IDT Planned Review: 6/6/2024     Gayle Richardson RN Post Acute Care Manager [phone]

## 2024-05-31 ENCOUNTER — HOSPITAL ENCOUNTER (OUTPATIENT)
Dept: WOUND CARE | Age: 84
Discharge: HOME | End: 2024-05-31
Payer: MEDICARE

## 2024-05-31 ENCOUNTER — HOSPITAL ENCOUNTER (OUTPATIENT)
Age: 84
Setting detail: SPECIMEN
Discharge: HOME | End: 2024-05-31

## 2024-05-31 VITALS
HEART RATE: 76 BPM | WEIGHT: 166 LBS | RESPIRATION RATE: 18 BRPM | DIASTOLIC BLOOD PRESSURE: 74 MMHG | BODY MASS INDEX: 34.85 KG/M2 | TEMPERATURE: 97.5 F | HEIGHT: 58 IN | SYSTOLIC BLOOD PRESSURE: 125 MMHG

## 2024-05-31 DIAGNOSIS — S81.002D OPEN WOUND OF LEFT KNEE, SUBSEQUENT ENCOUNTER: ICD-10-CM

## 2024-05-31 DIAGNOSIS — S81.002A OPEN WOUND OF LEFT KNEE, INITIAL ENCOUNTER: Primary | ICD-10-CM

## 2024-05-31 PROCEDURE — 11042 DBRDMT SUBQ TIS 1ST 20SQCM/<: CPT

## 2024-05-31 PROCEDURE — 11042 DBRDMT SUBQ TIS 1ST 20SQCM/<: CPT | Performed by: INTERNAL MEDICINE

## 2024-05-31 PROCEDURE — 99213 OFFICE O/P EST LOW 20 MIN: CPT | Performed by: INTERNAL MEDICINE

## 2024-05-31 RX ORDER — LIDOCAINE HYDROCHLORIDE 20 MG/ML
JELLY TOPICAL ONCE
OUTPATIENT
Start: 2024-05-31 | End: 2024-05-31

## 2024-05-31 RX ORDER — LIDOCAINE HYDROCHLORIDE 40 MG/ML
SOLUTION TOPICAL ONCE
OUTPATIENT
Start: 2024-05-31 | End: 2024-05-31

## 2024-05-31 RX ORDER — CLOBETASOL PROPIONATE 0.5 MG/G
OINTMENT TOPICAL ONCE
OUTPATIENT
Start: 2024-05-31 | End: 2024-05-31

## 2024-05-31 RX ORDER — LIDOCAINE 50 MG/G
OINTMENT TOPICAL ONCE
OUTPATIENT
Start: 2024-05-31 | End: 2024-05-31

## 2024-05-31 RX ORDER — TRIAMCINOLONE ACETONIDE 1 MG/G
OINTMENT TOPICAL ONCE
OUTPATIENT
Start: 2024-05-31 | End: 2024-05-31

## 2024-05-31 RX ORDER — GENTAMICIN SULFATE 1 MG/G
OINTMENT TOPICAL ONCE
OUTPATIENT
Start: 2024-05-31 | End: 2024-05-31

## 2024-05-31 RX ORDER — SODIUM CHLOR/HYPOCHLOROUS ACID 0.033 %
SOLUTION, IRRIGATION IRRIGATION ONCE
OUTPATIENT
Start: 2024-05-31 | End: 2024-05-31

## 2024-05-31 RX ORDER — BETAMETHASONE DIPROPIONATE 0.5 MG/G
CREAM TOPICAL ONCE
OUTPATIENT
Start: 2024-05-31 | End: 2024-05-31

## 2024-05-31 RX ORDER — GINSENG 100 MG
CAPSULE ORAL ONCE
OUTPATIENT
Start: 2024-05-31 | End: 2024-05-31

## 2024-05-31 RX ORDER — LIDOCAINE 40 MG/G
CREAM TOPICAL ONCE
OUTPATIENT
Start: 2024-05-31 | End: 2024-05-31

## 2024-05-31 RX ORDER — BACITRACIN ZINC AND POLYMYXIN B SULFATE 500; 1000 [USP'U]/G; [USP'U]/G
OINTMENT TOPICAL ONCE
OUTPATIENT
Start: 2024-05-31 | End: 2024-05-31

## 2024-05-31 RX ORDER — IBUPROFEN 200 MG
TABLET ORAL ONCE
OUTPATIENT
Start: 2024-05-31 | End: 2024-05-31

## 2024-05-31 RX ORDER — LIDOCAINE HYDROCHLORIDE 40 MG/ML
SOLUTION TOPICAL ONCE
Status: COMPLETED | OUTPATIENT
Start: 2024-05-31 | End: 2024-05-31

## 2024-05-31 RX ADMIN — LIDOCAINE HYDROCHLORIDE 5 ML: 40 SOLUTION TOPICAL at 08:41

## 2024-05-31 ASSESSMENT — PAIN SCALES - GENERAL: PAINLEVEL_OUTOF10: 0

## 2024-05-31 NOTE — PROGRESS NOTES
120 capsule 3    tiZANidine (ZANAFLEX) 2 MG tablet Take 1 tablet by mouth nightly as needed (for pain) 10 tablet 0    ranolazine (RANEXA) 500 MG extended release tablet Take 1 tablet by mouth 2 times daily 60 tablet 1    bisacodyl (DULCOLAX) 5 MG EC tablet Take 1 tablet by mouth 2 times daily as needed for Constipation      polyethylene glycol (GLYCOLAX) 17 g packet Take 1 packet by mouth daily as needed for Constipation (Patient not taking: Reported on 4/26/2024) 527 g 1    insulin glargine (LANTUS SOLOSTAR) 100 UNIT/ML injection pen Inject 10 Units into the skin nightly 5 Adjustable Dose Pre-filled Pen Syringe 1    Insulin Pen Needle (SnapNamesOGER PEN NEEDLES 29G) 29G X 12MM MISC 1 each by Does not apply route daily 100 each 3    atorvastatin (LIPITOR) 10 MG tablet Take 1 tablet by mouth      primidone (MYSOLINE) 50 MG tablet Take 1 tablet by mouth 2 times daily 180 tablet 2    glipiZIDE (GLUCOTROL) 5 MG tablet Takes 1.5 tabs in AM and 0.5 tab in PM      rOPINIRole (REQUIP) 4 MG tablet Take 1 tablet by mouth nightly 30 tablet 1    clopidogrel (PLAVIX) 75 MG tablet Take 1 tablet by mouth daily      acetaminophen (TYLENOL) 500 MG tablet Take 2 tablets by mouth every 6 hours as needed for Pain 60 tablet 0    Continuous Blood Gluc Sensor (FREESTYLE DAVIS 14 DAY SENSOR) MISC       Continuous Blood Gluc  (FREESTYLE DAVIS 14 DAY READER) MATTHEW       CRANBERRY PO Take by mouth 2 times daily       Current Facility-Administered Medications on File Prior to Encounter   Medication Dose Route Frequency Provider Last Rate Last Admin    0.9 % sodium chloride infusion 250 mL  250 mL IntraVENous Once Treasure Wolff MD           REVIEW OF SYSTEMS  Review of Systems    A comprehensive review of systems was negative.    Objective:      /74   Pulse 76   Temp 97.5 °F (36.4 °C) (Tympanic)   Resp 18   Ht 1.473 m (4' 10\")   Wt 75.3 kg (166 lb)   BMI 34.69 kg/m²     Wt Readings from Last 3 Encounters:   05/31/24 75.3 kg (166

## 2024-06-03 ENCOUNTER — HOSPITAL ENCOUNTER (OUTPATIENT)
Age: 84
Setting detail: SPECIMEN
Discharge: HOME | End: 2024-06-03

## 2024-06-03 LAB
CREAT SERPL-MCNC: 0.8 MG/DL (ref 0.5–0.9)
CRP SERPL HS-MCNC: 7.1 MG/L (ref 0–5)
ERYTHROCYTE [DISTWIDTH] IN BLOOD BY AUTOMATED COUNT: 18.3 % (ref 11.8–14.4)
ERYTHROCYTE [SEDIMENTATION RATE] IN BLOOD BY PHOTOMETRIC METHOD: 54 MM/HR (ref 0–30)
GFR, ESTIMATED: 70 ML/MIN/1.73M2
HCT VFR BLD AUTO: 30.8 % (ref 36.3–47.1)
HGB BLD-MCNC: 9.4 G/DL (ref 11.9–15.1)
MCH RBC QN AUTO: 28 PG (ref 25.2–33.5)
MCHC RBC AUTO-ENTMCNC: 30.5 G/DL (ref 28.4–34.8)
MCV RBC AUTO: 91.7 FL (ref 82.6–102.9)
NRBC BLD-RTO: 0 PER 100 WBC
PLATELET # BLD AUTO: ABNORMAL K/UL (ref 138–453)
PLATELET, FLUORESCENCE: 170 K/UL (ref 138–453)
PLATELETS.RETICULATED NFR BLD AUTO: 18.6 % (ref 1.1–10.3)
RBC # BLD AUTO: 3.36 M/UL (ref 3.95–5.11)
WBC OTHER # BLD: 6.4 K/UL (ref 3.5–11.3)

## 2024-06-03 PROCEDURE — P9603 ONE-WAY ALLOW PRORATED MILES: HCPCS

## 2024-06-03 PROCEDURE — 85027 COMPLETE CBC AUTOMATED: CPT

## 2024-06-03 PROCEDURE — 36415 COLL VENOUS BLD VENIPUNCTURE: CPT

## 2024-06-03 PROCEDURE — 85652 RBC SED RATE AUTOMATED: CPT

## 2024-06-03 PROCEDURE — 85055 RETICULATED PLATELET ASSAY: CPT

## 2024-06-03 PROCEDURE — 86140 C-REACTIVE PROTEIN: CPT

## 2024-06-03 PROCEDURE — 82565 ASSAY OF CREATININE: CPT

## 2024-06-05 ENCOUNTER — TELEPHONE (OUTPATIENT)
Dept: PRIMARY CARE CLINIC | Age: 84
End: 2024-06-05

## 2024-06-05 ENCOUNTER — CARE COORDINATION (OUTPATIENT)
Dept: CARE COORDINATION | Age: 84
End: 2024-06-05

## 2024-06-05 NOTE — TELEPHONE ENCOUNTER
Care Transitions Initial Follow Up Call    Outreach made within 2 business days of discharge: Yes    Patient: Komal Crawford Patient : 1940   MRN: [id number]  Reason for Admission: There are no discharge diagnoses documented for the most recent discharge.  Discharge Date: 24       Spoke with: pt    Discharge department/facility: [facility] Interactive Patient Contact:  Was patient able to fill all prescriptions: Yes  Was patient instructed to bring all medications to the follow-up visit: Yes  Is patient taking all medications as directed in the discharge summary? Yes  Does patient understand their discharge instructions: Yes  Does patient have questions or concerns that need addressed prior to 7-14 day follow up office visit: no    Scheduled appointment with PCP within 7-14 days    Follow Up  Future Appointments   Date Time Provider Department Center   2024 10:20 AM Omega Lawson MD [facility] URO [facility]   2024  8:40 AM Sabiha Bedolla MD [facility]   2024 10:15 AM Damian Keyes MD [facility]   2024  9:15 AM David Arteaga MD [facility] WND CAR  [place]   8/15/2024 11:00 AM Bogdan Guzmán MD [facility] NEURO Neurology -       BREANNA MARINO MA

## 2024-06-06 ENCOUNTER — OFFICE VISIT (OUTPATIENT)
Age: 84
End: 2024-06-06
Payer: MEDICARE

## 2024-06-06 VITALS — BODY MASS INDEX: 34.85 KG/M2 | HEIGHT: 58 IN | WEIGHT: 166 LBS

## 2024-06-06 DIAGNOSIS — N31.9 NEUROGENIC BLADDER: Primary | ICD-10-CM

## 2024-06-06 DIAGNOSIS — N95.2 ATROPHIC VAGINITIS: ICD-10-CM

## 2024-06-06 DIAGNOSIS — R33.9 URINARY RETENTION: ICD-10-CM

## 2024-06-06 DIAGNOSIS — N39.0 RECURRENT UTI: ICD-10-CM

## 2024-06-06 LAB
BILIRUBIN, POC: NORMAL
BLOOD URINE, POC: NORMAL
CLARITY, POC: CLEAR
COLOR, POC: YELLOW
GLUCOSE URINE, POC: NORMAL
KETONES, POC: NORMAL
LEUKOCYTE EST, POC: NORMAL
NITRITE, POC: NORMAL
PH, POC: NORMAL
PROTEIN, POC: NORMAL
SPECIFIC GRAVITY, POC: NORMAL
UROBILINOGEN, POC: NORMAL

## 2024-06-06 PROCEDURE — 1123F ACP DISCUSS/DSCN MKR DOCD: CPT | Performed by: SPECIALIST

## 2024-06-06 PROCEDURE — G8400 PT W/DXA NO RESULTS DOC: HCPCS | Performed by: SPECIALIST

## 2024-06-06 PROCEDURE — 1036F TOBACCO NON-USER: CPT | Performed by: SPECIALIST

## 2024-06-06 PROCEDURE — 81003 URINALYSIS AUTO W/O SCOPE: CPT | Performed by: SPECIALIST

## 2024-06-06 PROCEDURE — G8417 CALC BMI ABV UP PARAM F/U: HCPCS | Performed by: SPECIALIST

## 2024-06-06 PROCEDURE — 1090F PRES/ABSN URINE INCON ASSESS: CPT | Performed by: SPECIALIST

## 2024-06-06 PROCEDURE — 99214 OFFICE O/P EST MOD 30 MIN: CPT | Performed by: SPECIALIST

## 2024-06-06 PROCEDURE — G8427 DOCREV CUR MEDS BY ELIG CLIN: HCPCS | Performed by: SPECIALIST

## 2024-06-06 RX ORDER — NITROGLYCERIN 0.4 MG/1
TABLET SUBLINGUAL
COMMUNITY
Start: 2022-04-07

## 2024-06-06 NOTE — PROGRESS NOTES
Omega Lawson MD [facility] Urology Office Progress Note    Patient:  Komal Crawford  YOB: 1940  Date: 6/6/2024    HISTORY OF PRESENT ILLNESS:   The patient is a 83 y.o. female  The patient presents with a long history of recurrent UTI's and incomplete bladder emptying.  Patient has been doing intermittent straight catheterization but is having increasing difficulty doing this and she would like to avoid an indwelling urethral reeves catheter.  She is interested in a Suprapubic (SP) catheter but I warned her that she could still have Urge urinary incontinence via her urethra with this but she thinks this mainly occurs when her bladder is full.  She had extensive orthopedic surgery on her left hip and knee and she will need to get the OK from Dr. Jaylen Keyes to be placed in the dorsal lithotomy position.  Continue Cranberry pills po bid for UTI prevention and Macrobid 1 po qd for UTI prophylaxis.  Lower urinary tract symptoms: n/a   Last AUA Symptom Score (QOL): 6 (1)    Summary of old records:   Urge incontinence: 2-3 ppd, s/p Interstim 12/10/13, removed 7/15/21, 7/20/17; Botox 200 IU 9/8/17, Botox 300 IU 5/14/21; Trospium SR 60 mg qd-stopped due to IBS; pain over Interstim, no infection  Incomplete bladder emptying on ISC 7 times a day; placed indwelling reeves catheter 4/14/21; Myrbetriq 50 mg qd for spasms  \"Recurrent UTI\"; placed on Cranberry bid, Estrace vaginal cream, Macrobid qd, D-mannose tid (ID=Taleb); 12/14/16 Klebsiella/Enterococcus, 2/20/17 E coli, 3/29/21 E coli; 4/14/21 E coli and yeast; 7/21/21 E coli, 7/27/21 yeast; 4/28/21 cysto: erythema c/w yeast infection  TERRI s/p sling 2003, [place]  Bilateral renal cysts 7/10/12 parapelvic, 10/12/18 CT  Extrarenal pelvic R>L on 11/1/19 CT    Additional History: none    Procedures Today: N/A    Urinalysis today:  Results for POC orders placed in visit on 06/06/24   POCT Urinalysis No Micro (Auto)   Result Value Ref Range

## 2024-06-07 ENCOUNTER — TELEPHONE (OUTPATIENT)
Dept: PRIMARY CARE CLINIC | Age: 84
End: 2024-06-07

## 2024-06-07 NOTE — TELEPHONE ENCOUNTER
Beatris from [facility] states she was just with pt for physical therapy home care. Beatris wanted to know if its okay to go strength  and balance physical therapy with the patient.

## 2024-06-07 NOTE — CARE COORDINATION
Discharged home today  
this time. Were discharge instructions available to patient? No. Reviewed appropriate site of care based on symptoms and resources available to patient including: PCP  Specialist  Home health  When to call 911  Condition related references. The patient agrees to contact the PCP office for questions related to their healthcare.     Advance Care Planning:   Does patient have an Advance Directive: not on file; education provided.    Medication reconciliation was performed with patient, who verbalizes understanding of administration of home medications. Medications reviewed, 1111F entered: no    Was patient discharged with a pulse oximeter? no    Non-face-to-face services provided:  Obtained and reviewed discharge summary and/or continuity of care documents  Education of patient/family/caregiver/guardian to support self-management-completed    Offered patient enrollment in the Remote Patient Monitoring (RPM) program for in-home monitoring: Patient is not eligible for RPM program because: deferred at this time; will discuss at future followup.    Care Transitions 24 Hour Call    Do you have all of your prescriptions and are they filled?: Yes (Comment: did not get the [place])  Post Acute Services: Home Health (Comment: [facility] - now d/c since pt is doing OT therapy)  Care Transitions Interventions    Physical Therapy: Completed             Discussed follow-up appointments. If no appointment was previously scheduled, appointment scheduling offered: Yes.   Is follow up appointment scheduled within 7 days of discharge? Yes.    Follow Up  Future Appointments   Date Time Provider Department Center   6/12/2024  8:40 AM Sabiha Bedolla MD [facility]   6/12/2024 10:15 AM Damian Keyes MD [facility]   6/14/2024  9:15 AM David Arteaga MD [facility]   8/15/2024 11:00 AM Bogdan Guzmán MD [facility] NEURO Neurology -       Post Acute Care Manager provided contact information.  Plan for follow-up

## 2024-06-10 ENCOUNTER — CARE COORDINATION (OUTPATIENT)
Dept: CARE COORDINATION | Age: 84
End: 2024-06-10

## 2024-06-10 NOTE — CARE COORDINATION
Ambulatory Care Coordination Note  6/10/2024    Patient Current Location:  Home: [place]  [address]  [place] [address]    ACM contacted the patient by telephone. Verified name and  with patient as identifiers. Provided introduction to self, and explanation of the ACM role.     ACM: Vivian Driver RN    Challenges to be reviewed by the provider   Additional needs identified to be addressed with provider: No  none               Method of communication with provider: none.    She had admit in January for fall, had left total hip arthroplasty and left total knee revision.Admit 6 weeks later for sepsis left knee incision,had I+D and wound vac placed. Another admit after that for anemia, had another lft knee I+D, wound closure and quadriceps tendon repair. She has been in rehab facilities in January, discharged home . Was treated at [facility] care [place].   Getting home nursing and therapy from [facility]. Using walker but can't walk far yet. Has a transport chair but too hard to maneuver in the house, she would like a regular small wheelchair to take to appts.  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 She had right hip arthroplasty last year. Follows with bobby Roy, neurology, urology, I.D., neurosurgeon, pain mgmt and cardiology.   is legally blind. They had friends that helped him and have been driving them both to appts since she was in rehab facilities, she was driving prior to that.  DM- recent A1c 6.2, checks blood sugar daily and have been good.  Last hgb 9.4. she had blood transfusions during a couple of admissions.  Saw urologist last week, urine negative for infection. She used to straight cath but getting more difficult, may get a suprapubic catheter, she had a reeves for a few months during admissions. Takes cranberry pills and is supposed to be on

## 2024-06-11 RX ORDER — NITROFURANTOIN 25; 75 MG/1; MG/1
100 CAPSULE ORAL DAILY
Qty: 30 CAPSULE | Refills: 2 | Status: SHIPPED | OUTPATIENT
Start: 2024-06-11

## 2024-06-12 ENCOUNTER — OFFICE VISIT (OUTPATIENT)
Dept: ORTHOPEDIC SURGERY | Age: 84
End: 2024-06-12

## 2024-06-12 ENCOUNTER — FOLLOWUP TELEPHONE ENCOUNTER (OUTPATIENT)
Dept: WOUND CARE | Age: 84
End: 2024-06-12

## 2024-06-12 ENCOUNTER — OFFICE VISIT (OUTPATIENT)
Dept: PRIMARY CARE CLINIC | Age: 84
End: 2024-06-12

## 2024-06-12 VITALS — SYSTOLIC BLOOD PRESSURE: 150 MMHG | DIASTOLIC BLOOD PRESSURE: 86 MMHG | HEART RATE: 80 BPM

## 2024-06-12 DIAGNOSIS — R05.3 CHRONIC COUGH: ICD-10-CM

## 2024-06-12 DIAGNOSIS — E11.51 TYPE 2 DIABETES MELLITUS WITH DIABETIC PERIPHERAL ANGIOPATHY WITHOUT GANGRENE, WITHOUT LONG-TERM CURRENT USE OF INSULIN (HCC): Primary | ICD-10-CM

## 2024-06-12 DIAGNOSIS — R06.09 OTHER FORM OF DYSPNEA: ICD-10-CM

## 2024-06-12 DIAGNOSIS — I48.11 LONGSTANDING PERSISTENT ATRIAL FIBRILLATION (HCC): ICD-10-CM

## 2024-06-12 DIAGNOSIS — Z96.652 HISTORY OF TOTAL KNEE ARTHROPLASTY, LEFT: Primary | ICD-10-CM

## 2024-06-12 DIAGNOSIS — Z91.81 AT HIGH RISK FOR FALLS: ICD-10-CM

## 2024-06-12 DIAGNOSIS — L89.312 PRESSURE INJURY OF RIGHT BUTTOCK, STAGE 2 (HCC): ICD-10-CM

## 2024-06-12 DIAGNOSIS — F33.40 RECURRENT MAJOR DEPRESSIVE DISORDER, IN REMISSION (HCC): ICD-10-CM

## 2024-06-12 DIAGNOSIS — K21.9 GASTROESOPHAGEAL REFLUX DISEASE, UNSPECIFIED WHETHER ESOPHAGITIS PRESENT: ICD-10-CM

## 2024-06-12 PROBLEM — G20.A1 PARKINSON'S DISEASE (HCC): Status: ACTIVE | Noted: 2024-06-12

## 2024-06-12 LAB
HBA1C MFR BLD: 6.4 %
PRO-BNP: 395 PG/ML (ref 0–300)

## 2024-06-12 PROCEDURE — 99024 POSTOP FOLLOW-UP VISIT: CPT | Performed by: ORTHOPAEDIC SURGERY

## 2024-06-12 RX ORDER — MIDODRINE HYDROCHLORIDE 2.5 MG/1
2.5 TABLET ORAL 3 TIMES DAILY
COMMUNITY

## 2024-06-12 RX ORDER — DOXYCYCLINE HYCLATE 100 MG/1
100 CAPSULE ORAL 2 TIMES DAILY
COMMUNITY

## 2024-06-12 RX ORDER — TIZANIDINE 2 MG/1
2 TABLET ORAL DAILY
COMMUNITY

## 2024-06-12 RX ORDER — NITROFURANTOIN MACROCRYSTALS 100 MG/1
100 CAPSULE ORAL 2 TIMES DAILY
COMMUNITY

## 2024-06-12 RX ORDER — FAMOTIDINE 40 MG/1
40 TABLET, FILM COATED ORAL EVERY EVENING
Qty: 30 TABLET | Refills: 3 | Status: SHIPPED | OUTPATIENT
Start: 2024-06-12

## 2024-06-12 SDOH — ECONOMIC STABILITY: FOOD INSECURITY: WITHIN THE PAST 12 MONTHS, YOU WORRIED THAT YOUR FOOD WOULD RUN OUT BEFORE YOU GOT MONEY TO BUY MORE.: NEVER TRUE

## 2024-06-12 SDOH — ECONOMIC STABILITY: FOOD INSECURITY: WITHIN THE PAST 12 MONTHS, THE FOOD YOU BOUGHT JUST DIDN'T LAST AND YOU DIDN'T HAVE MONEY TO GET MORE.: NEVER TRUE

## 2024-06-12 SDOH — ECONOMIC STABILITY: INCOME INSECURITY: HOW HARD IS IT FOR YOU TO PAY FOR THE VERY BASICS LIKE FOOD, HOUSING, MEDICAL CARE, AND HEATING?: NOT HARD AT ALL

## 2024-06-12 ASSESSMENT — PATIENT HEALTH QUESTIONNAIRE - PHQ9
SUM OF ALL RESPONSES TO PHQ QUESTIONS 1-9: 0
10. IF YOU CHECKED OFF ANY PROBLEMS, HOW DIFFICULT HAVE THESE PROBLEMS MADE IT FOR YOU TO DO YOUR WORK, TAKE CARE OF THINGS AT HOME, OR GET ALONG WITH OTHER PEOPLE: NOT DIFFICULT AT ALL
5. POOR APPETITE OR OVEREATING: NOT AT ALL
SUM OF ALL RESPONSES TO PHQ9 QUESTIONS 1 & 2: 0
2. FEELING DOWN, DEPRESSED OR HOPELESS: NOT AT ALL
SUM OF ALL RESPONSES TO PHQ QUESTIONS 1-9: 0
6. FEELING BAD ABOUT YOURSELF - OR THAT YOU ARE A FAILURE OR HAVE LET YOURSELF OR YOUR FAMILY DOWN: NOT AT ALL
SUM OF ALL RESPONSES TO PHQ QUESTIONS 1-9: 0
4. FEELING TIRED OR HAVING LITTLE ENERGY: NOT AT ALL
8. MOVING OR SPEAKING SO SLOWLY THAT OTHER PEOPLE COULD HAVE NOTICED. OR THE OPPOSITE, BEING SO FIGETY OR RESTLESS THAT YOU HAVE BEEN MOVING AROUND A LOT MORE THAN USUAL: NOT AT ALL
1. LITTLE INTEREST OR PLEASURE IN DOING THINGS: NOT AT ALL
3. TROUBLE FALLING OR STAYING ASLEEP: NOT AT ALL
SUM OF ALL RESPONSES TO PHQ QUESTIONS 1-9: 0
9. THOUGHTS THAT YOU WOULD BE BETTER OFF DEAD, OR OF HURTING YOURSELF: NOT AT ALL
7. TROUBLE CONCENTRATING ON THINGS, SUCH AS READING THE NEWSPAPER OR WATCHING TELEVISION: NOT AT ALL

## 2024-06-12 NOTE — PROGRESS NOTES
Pt called [facility] stating she didn't have a ride to her apt on 6//14/24 and requested to reschedule. Rescheduled on 6/24/24 due to her first availability.

## 2024-06-12 NOTE — PROGRESS NOTES
Komal returns today.  She is status post revision left total knee for periprosthetic fracture subsequent irrigation debridement back to sure lavage.  She had delayed wound healing and wound care also with IV antibiotics now on oral antibiotics patient has been discharged to home.  She has been ambulating with a home therapy but she still would be requiring the use of a brace due to the repair of her quadriceps tendon repair patient also states that she had noticed that her left foot is rolling in a little bit from etiology of this is unknown    Physical examination does the patient's knee wound looks amazingly good.  She has just a small pencil eraser size area no active drainage.  Slightly surrounding redness but significant improvement from her wound she cannot hold her knee quite up against gravity but can do some extension from a flexion of 80 degrees.  Her varus valgus stability is obviously good given that she has a hinged knee.  Overall given what she has gone through her knee looks remarkably well.    No new x-rays were taken today    Impression  Status post revision left total knee for periprosthetic fracture subsequent irrigation debridement for infection but now being take care of with chronic suppressive antibiotics    Plan  Patient is no longer having to have wound care and just basically just needs a Band-Aid for this wound as it appears that could be closing on its own.  Patient is requesting a new brace and really give her much more a lot lighter and easier to apply hinged knee brace.  We she would also like to start outpatient therapy at some point in the future we did give her a prescription for this.  Will see the patient back here in 6 weeks

## 2024-06-12 NOTE — PROGRESS NOTES
[facility]  [facility]  [address]  [place] [address]  Dept: [phone]    Komal Crawford is a 83 y.o. female Established patient, who presents today for her medical conditions/complaintsas noted below.      Chief Complaint   Patient presents with    Follow-Up from Hospital     Pt is here for a f/u.        HPI:     HPI  Is supposed to be getting home nurse and PT. Hasn't started yet  Has been in the hospital or rehab for 6 months.   Home since June 3rd.  Her spouse has a lady from the [facility] to help with the cleaning.  They are able to manage meals  She has a left knee brace on and can use walker 90 steps, but she gets tired and has to sit quickly ,can stand only briefly, 5-6 minutes  Uses the wheelchair most of the time.   Dry cough x months day and night.   Has been taking her isosorbide since she has been home. She has NOT been on it from the [facility], she restarted it on her own.     Last night  she felt she was crashing, felt tired and scared and felt faint and lightheaded and laid down. Better today    A!C ok today    Reviewed prior notes  ID and urology   seeing ortho.  Reviewed previous Labs   Has tremors: sees Dr Guzmán   Doesn't have willi  Hasn't seen cardiologist lately due to hosptializations  No components found for: \"LDLCHOLESTEROL\", \"LDLCALC\"    (goal LDL is <100)   AST (U/L)   Date Value   04/03/2024 14     ALT (U/L)   Date Value   04/03/2024 10     BUN (mg/dL)   Date Value   05/29/2024 19     Hemoglobin A1C (%)   Date Value   03/06/2024 6.2 (H)     TSH (uIU/mL)   Date Value   12/06/2022 2.07     BP Readings from Last 3 Encounters:   06/12/24 (!) 150/86   05/31/24 125/74   05/10/24 137/70          (goal 120/80)    Past Medical History:   Diagnosis Date    Acquired absence of both cervix and uterus     Acquired absence of other organs     Age-related cognitive decline     Age-related osteoporosis without current pathological fracture     Ankle pain

## 2024-06-12 NOTE — DISCHARGE INSTRUCTIONS
slippage, breakthrough drainage.     If you need medical attention outside of the business hours of the Wound Care Centers please contact your PCP or go to the nearest emergency room.     The information contained in the After Visit Summary has been reviewed with me, the patient and/or responsible adult, by my health care provider(s). I had the opportunity to ask questions regarding this information. I have elected to receive;      []After Visit Summary  [x]Comprehensive Discharge Instruction        Patient signature______________________________________Date:_______

## 2024-06-13 NOTE — RESULT ENCOUNTER NOTE
BNP for congestive heart failure is slightly above normal, it is about where it was 6 months ago.  It does not appear it is a significant increase from 6 months ago.  She should make a follow-up appointment with her cardiologist

## 2024-06-14 ENCOUNTER — HOSPITAL ENCOUNTER (OUTPATIENT)
Dept: WOUND CARE | Age: 84
Discharge: HOME | End: 2024-06-14

## 2024-06-15 ENCOUNTER — APPOINTMENT (OUTPATIENT)
Dept: GENERAL RADIOLOGY | Age: 84
End: 2024-06-15
Attending: EMERGENCY MEDICINE
Payer: MEDICARE

## 2024-06-15 ENCOUNTER — APPOINTMENT (OUTPATIENT)
Dept: CT IMAGING | Age: 84
End: 2024-06-15
Payer: MEDICARE

## 2024-06-15 ENCOUNTER — HOSPITAL ENCOUNTER (EMERGENCY)
Age: 84
Discharge: HOME | End: 2024-06-15
Attending: EMERGENCY MEDICINE
Payer: MEDICARE

## 2024-06-15 VITALS
TEMPERATURE: 97.9 F | HEIGHT: 57 IN | OXYGEN SATURATION: 99 % | DIASTOLIC BLOOD PRESSURE: 62 MMHG | SYSTOLIC BLOOD PRESSURE: 122 MMHG | BODY MASS INDEX: 32.79 KG/M2 | WEIGHT: 152 LBS | HEART RATE: 65 BPM | RESPIRATION RATE: 12 BRPM
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
## 2024-06-15 DIAGNOSIS — S99.912A INJURY OF LEFT ANKLE, INITIAL ENCOUNTER: ICD-10-CM

## 2024-06-15 LAB
ALBUMIN SERPL-MCNC: 3.4 G/DL (ref 3.5–5.2)
ALP SERPL-CCNC: 94 U/L (ref 35–104)
ALT SERPL-CCNC: 12 U/L (ref 5–33)
ANION GAP SERPL CALCULATED.3IONS-SCNC: 13 MMOL/L (ref 9–17)
AST SERPL-CCNC: 16 U/L
BASOPHILS # BLD: 0.1 K/UL (ref 0–0.2)
BASOPHILS NFR BLD: 1 % (ref 0–2)
BILIRUB SERPL-MCNC: 0.4 MG/DL (ref 0.3–1.2)
BUN SERPL-MCNC: 21 MG/DL (ref 8–23)
CALCIUM SERPL-MCNC: 9 MG/DL (ref 8.6–10.4)
CHLORIDE SERPL-SCNC: 107 MMOL/L (ref 98–107)
CO2 SERPL-SCNC: 19 MMOL/L (ref 20–31)
CREAT SERPL-MCNC: 0.9 MG/DL (ref 0.5–0.9)
EOSINOPHIL # BLD: 0.2 K/UL (ref 0–0.4)
EOSINOPHILS RELATIVE PERCENT: 2 % (ref 0–4)
ERYTHROCYTE [DISTWIDTH] IN BLOOD BY AUTOMATED COUNT: 18.9 % (ref 11.5–14.9)
GFR, ESTIMATED: 64 ML/MIN/1.73M2
GLUCOSE SERPL-MCNC: 172 MG/DL (ref 70–99)
HCT VFR BLD AUTO: 32.2 % (ref 36–46)
HGB BLD-MCNC: 10.1 G/DL (ref 12–16)
INR PPP: 1.2
LIPASE SERPL-CCNC: 17 U/L (ref 13–60)
LYMPHOCYTES NFR BLD: 1.3 K/UL (ref 1–4.8)
LYMPHOCYTES RELATIVE PERCENT: 17 % (ref 24–44)
MAGNESIUM SERPL-MCNC: 1.9 MG/DL (ref 1.6–2.6)
MCH RBC QN AUTO: 28 PG (ref 26–34)
MCHC RBC AUTO-ENTMCNC: 31.4 G/DL (ref 31–37)
MCV RBC AUTO: 89.2 FL (ref 80–100)
MONOCYTES NFR BLD: 0.7 K/UL (ref 0.1–1.3)
MONOCYTES NFR BLD: 9 % (ref 1–7)
NEUTROPHILS NFR BLD: 71 % (ref 36–66)
NEUTS SEG NFR BLD: 5.5 K/UL (ref 1.3–9.1)
PLATELET # BLD AUTO: 169 K/UL (ref 150–450)
PMV BLD AUTO: 12.3 FL (ref 6–12)
POTASSIUM SERPL-SCNC: 4 MMOL/L (ref 3.7–5.3)
PROT SERPL-MCNC: 6.8 G/DL (ref 6.4–8.3)
PROTHROMBIN TIME: 15.3 SEC (ref 11.8–14.6)
RBC # BLD AUTO: 3.61 M/UL (ref 4–5.2)
SODIUM SERPL-SCNC: 139 MMOL/L (ref 135–144)
WBC OTHER # BLD: 7.8 K/UL (ref 3.5–11)

## 2024-06-15 PROCEDURE — 99284 EMERGENCY DEPT VISIT MOD MDM: CPT

## 2024-06-15 PROCEDURE — 85610 PROTHROMBIN TIME: CPT

## 2024-06-15 PROCEDURE — 83735 ASSAY OF MAGNESIUM: CPT

## 2024-06-15 PROCEDURE — 83690 ASSAY OF LIPASE: CPT

## 2024-06-15 PROCEDURE — 73502 X-RAY EXAM HIP UNI 2-3 VIEWS: CPT

## 2024-06-15 PROCEDURE — 73620 X-RAY EXAM OF FOOT: CPT

## 2024-06-15 PROCEDURE — 36415 COLL VENOUS BLD VENIPUNCTURE: CPT

## 2024-06-15 PROCEDURE — 72125 CT NECK SPINE W/O DYE: CPT

## 2024-06-15 PROCEDURE — 73610 X-RAY EXAM OF ANKLE: CPT

## 2024-06-15 PROCEDURE — 80053 COMPREHEN METABOLIC PANEL: CPT

## 2024-06-15 PROCEDURE — 85025 COMPLETE CBC W/AUTO DIFF WBC: CPT

## 2024-06-15 PROCEDURE — 6370000000 HC RX 637 (ALT 250 FOR IP): Performed by: EMERGENCY MEDICINE

## 2024-06-15 PROCEDURE — 72131 CT LUMBAR SPINE W/O DYE: CPT

## 2024-06-15 PROCEDURE — 72192 CT PELVIS W/O DYE: CPT

## 2024-06-15 PROCEDURE — 73590 X-RAY EXAM OF LOWER LEG: CPT

## 2024-06-15 PROCEDURE — 70450 CT HEAD/BRAIN W/O DYE: CPT

## 2024-06-15 PROCEDURE — 73562 X-RAY EXAM OF KNEE 3: CPT

## 2024-06-15 RX ORDER — ACETAMINOPHEN 500 MG
1000 TABLET ORAL ONCE
Status: COMPLETED | OUTPATIENT
Start: 2024-06-15 | End: 2024-06-15

## 2024-06-15 RX ADMIN — ACETAMINOPHEN 1000 MG: 500 TABLET ORAL at 12:12

## 2024-06-15 ASSESSMENT — PAIN DESCRIPTION - DESCRIPTORS
DESCRIPTORS: SPASM
DESCRIPTORS: SPASM

## 2024-06-15 ASSESSMENT — PAIN DESCRIPTION - LOCATION
LOCATION: FOOT
LOCATION: ANKLE
LOCATION: FOOT

## 2024-06-15 ASSESSMENT — PAIN DESCRIPTION - ORIENTATION
ORIENTATION: LEFT

## 2024-06-15 ASSESSMENT — PAIN SCALES - GENERAL
PAINLEVEL_OUTOF10: 6
PAINLEVEL_OUTOF10: 5
PAINLEVEL_OUTOF10: 5

## 2024-06-15 ASSESSMENT — ENCOUNTER SYMPTOMS
EYE PAIN: 0
COLOR CHANGE: 0
BACK PAIN: 0
ABDOMINAL PAIN: 0
SHORTNESS OF BREATH: 0

## 2024-06-15 ASSESSMENT — PAIN - FUNCTIONAL ASSESSMENT: PAIN_FUNCTIONAL_ASSESSMENT: 0-10

## 2024-06-15 NOTE — ED TRIAGE NOTES
Mode of arrival (squad #, walk in, police, etc) : EMS        Chief complaint(s): fall, L ankle pain        Arrival Note (brief scenario, treatment PTA, etc).: Patient brought in by EMS from home (lives with ) c/o mechanical fall and L ankle pain. Patient trying to get out of the bathroom, walked 3-4 steps and felt her legs gave out, twisted her L ankle outward during the fall. Patient reports spasmic pain on L ankle 6 over 10. Patient had recent L knee surgery.        C= \"Have you ever felt that you should Cut down on your drinking?\"  No  A= \"Have people Annoyed you by criticizing your drinking?\"  No  G= \"Have you ever felt bad or Guilty about your drinking?\"  No  E= \"Have you ever had a drink as an Eye-opener first thing in the morning to steady your nerves or to help a hangover?\"  No      Deferred []      Reason for deferring: N/A    *If yes to two or more: probable alcohol abuse.*

## 2024-06-15 NOTE — ED PROVIDER NOTES
M54.9, G89.29    Diabetic peripheral neuropathy ([facility]) E11.42    Macular degeneration of left eye H35.30    Constipation K59.00    Thrombocytopenia ([facility]) D69.6    B12 deficiency E53.8    Vitamin D deficiency E55.9    Anemia D64.9    DDD (degenerative disc disease), cervical M50.30    Age-related cognitive decline R41.81    Acquired cyst of kidney N28.1    Microscopic hematuria R31.29    Bilateral renal cysts N28.1    Essential hypertension I10    Gastroesophageal reflux disease without esophagitis K21.9    Mixed incontinence N39.46    Recurrent UTI N39.0    Pure hypercholesterolemia E78.00    Hiatal hernia K44.9    Diabetic gastroparesis ([facility]) E11.43, K31.84    Internal hemorrhoid K64.8    Tubular adenoma D36.9    Colon polyps K63.5    Urge incontinence N39.41    S/P lumbar fusion Z98.1    Chronic ITP (idiopathic thrombocytopenia) ([facility]) D69.3    Urinary retention R33.9    Unsteadiness R26.81    Anxiety F41.9    Arthritis M19.90    Nausea R11.0    Diarrhea R19.7    Extrarenal pelvis [id number]    Primary osteoarthritis of left knee M17.12    Type 2 diabetes mellitus with diabetic peripheral angiopathy without gangrene, without long-term current use of insulin ([facility]) E11.51    Memory loss R41.3    Urethral pain R39.89    Bladder spasms N32.89    Atrophic vaginitis N95.2    Coronary artery disease involving native coronary artery of native heart without angina pectoris I25.10    Chronic fatigue R53.82    Essential tremor G25.0    Neuropathy G62.9    Coronary angioplasty status Z98.61    Recurrent major depressive disorder, in remission ([facility]) F33.40    Inability to walk R26.2    Presence of drug-eluting stent in right coronary artery Z95.5    Arthritis of right hip M16.11    History of total hip arthroplasty, right Z96.641    Pressure injury of right buttock, stage 2 ([facility]) L89.312    Tinea corporis B35.4    Debility R53.81    Left hip pain M25.552    Age related osteoporosis M81.0    Hip pain, acute, left M25.552     FRACTURE SURGERY Right 2011    hip    FRACTURE SURGERY Left 2001    WRIST    FRACTURE SURGERY Left 2013    ankle    HERNIA REPAIR      HIP SURGERY Right 11/6/2023    HIP HARDWARE REMOVAL - WITH DEEP HARDWARE REMOVAL 7.3 NABILA SCREW   X3 performed by Jon Bonner MD at [facility] OR    HYSTERECTOMY (CERVIX STATUS UNKNOWN)  1969    JOINT REPLACEMENT Bilateral 2000    BILAT KNEES    KNEE SURGERY Left 3/8/2024    KNEE INCISION AND DRAINAGE WITH WOUND VAC APPLICATION LEFT performed by Damian Keyes MD at [facility] OR    KNEE SURGERY Left 4/2/2024    LEFT KNEE INCISION AND DRAINAGE, BACTISURE WOUND LAVAGE, AND WOUND CLOSURE WITH PREVENA APPLICATION performed by Damian Keyes MD at [facility] OR    LEG MUSCLE SURGERY Left 4/2/2024    REPAIR OF QUADRACEPTS TENDON, performed by Damian Keyes MD at [facility] OR    LUMBAR FUSION  2017    L2/L3    [place] XCAPSL CTRC RMVL INSJ IO LENS PROSTH W/O ECP Left 11/07/2017    EYE CATARACT EMULSIFICATION IOL IMPLANT performed by Missy Clements MD at [facility] OR    NY XCAPSL CTRMARCEL RMVL INSJ IO LENS PROSTH W/O ECP Right 11/28/2017    EYE CATARACT EMULSIFICATION IOL IMPLANT performed by Missy Clements MD at [facility] OR    REVISION COLOSTOMY  1986    REVISION TOTAL KNEE ARTHROPLASTY Right 10/27/2015    WITH POLY EXCHANGE BIOMET AND GPS APPLICATION    REVISION TOTAL KNEE ARTHROPLASTY Left 07/14/2020    KNEE TOTAL ARTHROPLASTY REVISION - POLY EXCHANGE performed by Damian Keyes MD at [facility] OR    REVISION TOTAL KNEE ARTHROPLASTY Left 1/23/2024    KNEE TOTAL ARTHROPLASTY REVISION WITH OSS performed by Damian Keyes MD at [facility] OR    SPINE SURGERY  2010    fusion, did not take    SPINE SURGERY  1990    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      TONSILLECTOMY  1978    TOTAL HIP ARTHROPLASTY Right 11/6/2023    HIP TOTAL ARTHROPLASTY performed by Jon Bonner MD at [facility] OR    TOTAL HIP ARTHROPLASTY Left 1/20/2024    HIP TOTAL ARTHROPLASTY performed by Jon Bonner MD

## 2024-06-15 NOTE — ED NOTES
Unable to put ankle brace on because it wont fit with her ortho shoes, MD informed. Ace wrap bandage applied.

## 2024-06-17 ENCOUNTER — CARE COORDINATION (OUTPATIENT)
Dept: CARE COORDINATION | Age: 84
End: 2024-06-17

## 2024-06-17 NOTE — CARE COORDINATION
Ambulatory Care Coordination Note     2024 10:21 AM     Patient Current Location:  Home: [address] [facility] [address]     ACM contacted the patient by telephone. Verified name and  with patient as identifiers.         ACM: Vivian Drvier RN     Challenges to be reviewed by the provider   Additional needs identified to be addressed with provider No  none               Method of communication with provider: none.    Care Summary Note: ED visit 6/15 for fall, injury of left ankle. X ray negative, still is very painful, can't hardly walk on it. Not putting much weight on it when has to walk. Encouraged her to notify Dr. Keyes if gets worse, instructed to keep elevated, can apply ice packs, use walker when up walking. It is swollen.  She checks daily blood sugar and blood pressure, unable to give any numbers but they have been \"good\". Eating and drinking ok, appetite \"fair\". No bowel or bladder problems.  Left ankle is wrapped, she will have physical therapy rewrap it today. Continues to wear left leg brace.    Offered patient enrollment in the Remote Patient Monitoring (RPM) program for in-home monitoring: Yes, but did not enroll at this time: declined to enroll in the program because already monitors own vitals with own equipment .     Assessments Completed:   Hypertension - Encounter Level    Symptom course: stable       and   General Assessment    Do you have any symptoms that are causing concern?: Yes  Reported Symptoms: Pain (Comment: left foot pain)          Medications Reviewed:   Completed during a previous call     Advance Care Planning:   Not reviewed during this call     Care Planning:    Goals Addressed                   This Visit's Progress     Conditions and Symptoms   On track     I will schedule office visits, as directed by my provider.  I will keep my appointment or reschedule if I have to cancel.  I will notify my provider of any barriers to my plan of care.  I will follow

## 2024-06-19 ENCOUNTER — TELEPHONE (OUTPATIENT)
Dept: PRIMARY CARE CLINIC | Age: 84
End: 2024-06-19

## 2024-06-19 NOTE — TELEPHONE ENCOUNTER
Pt has the following meds on her med list but none in the home- Hydralazine, dicyclomine, atorvastatin, primidone, requip, plavix. [facility]    Pt has Metoprolol succinate and amlodipine at home but not on med list. Please clarify which meds she should be taking.       Ramila with [facility]   [phone]

## 2024-06-19 NOTE — TELEPHONE ENCOUNTER
Stay on metoprolol and amlodipine: I need the doses of those  Restart atorvastatin: will send in script.    the other meds that she does not have :  I will not restart and removed them from the list.

## 2024-06-21 NOTE — TELEPHONE ENCOUNTER
Per Ramila patient has multiple meds at home that she's been taking and is unsure about abruptly stopping the meds. Ramila is asking if patient can be scheduled to fully review meds and discuss possible issues when coming off meds.    Patient scheduled.

## 2024-06-24 ENCOUNTER — HOSPITAL ENCOUNTER (OUTPATIENT)
Dept: WOUND CARE | Age: 84
Discharge: HOME | End: 2024-06-24
Payer: MEDICARE

## 2024-06-24 ENCOUNTER — OFFICE VISIT (OUTPATIENT)
Dept: PRIMARY CARE CLINIC | Age: 84
End: 2024-06-24
Payer: MEDICARE

## 2024-06-24 VITALS
HEIGHT: 57 IN | SYSTOLIC BLOOD PRESSURE: 144 MMHG | HEART RATE: 78 BPM | OXYGEN SATURATION: 98 % | DIASTOLIC BLOOD PRESSURE: 86 MMHG | BODY MASS INDEX: 32.89 KG/M2

## 2024-06-24 VITALS
HEART RATE: 90 BPM | SYSTOLIC BLOOD PRESSURE: 148 MMHG | BODY MASS INDEX: 32.79 KG/M2 | DIASTOLIC BLOOD PRESSURE: 70 MMHG | WEIGHT: 152 LBS | HEIGHT: 57 IN | TEMPERATURE: 97.5 F | RESPIRATION RATE: 16 BRPM

## 2024-06-24 DIAGNOSIS — T84.54XD INFECTION ASSOCIATED WITH INTERNAL LEFT KNEE PROSTHESIS, SUBSEQUENT ENCOUNTER: ICD-10-CM

## 2024-06-24 DIAGNOSIS — G25.81 RESTLESS LEGS SYNDROME: ICD-10-CM

## 2024-06-24 DIAGNOSIS — Z96.659 INFECTION OF PROSTHETIC KNEE JOINT, SUBSEQUENT ENCOUNTER: ICD-10-CM

## 2024-06-24 DIAGNOSIS — S81.002A OPEN WOUND OF LEFT KNEE, INITIAL ENCOUNTER: Primary | ICD-10-CM

## 2024-06-24 DIAGNOSIS — T84.54XS INFECTION OF TOTAL LEFT KNEE REPLACEMENT, SEQUELA: ICD-10-CM

## 2024-06-24 DIAGNOSIS — Z86.73 HISTORY OF STROKE: Primary | ICD-10-CM

## 2024-06-24 DIAGNOSIS — T84.59XD INFECTION OF PROSTHETIC KNEE JOINT, SUBSEQUENT ENCOUNTER: ICD-10-CM

## 2024-06-24 DIAGNOSIS — K59.00 CONSTIPATION, UNSPECIFIED CONSTIPATION TYPE: ICD-10-CM

## 2024-06-24 PROCEDURE — 99214 OFFICE O/P EST MOD 30 MIN: CPT | Performed by: STUDENT IN AN ORGANIZED HEALTH CARE EDUCATION/TRAINING PROGRAM

## 2024-06-24 PROCEDURE — 3079F DIAST BP 80-89 MM HG: CPT | Performed by: STUDENT IN AN ORGANIZED HEALTH CARE EDUCATION/TRAINING PROGRAM

## 2024-06-24 PROCEDURE — G8427 DOCREV CUR MEDS BY ELIG CLIN: HCPCS | Performed by: STUDENT IN AN ORGANIZED HEALTH CARE EDUCATION/TRAINING PROGRAM

## 2024-06-24 PROCEDURE — 99214 OFFICE O/P EST MOD 30 MIN: CPT | Performed by: INTERNAL MEDICINE

## 2024-06-24 PROCEDURE — G8417 CALC BMI ABV UP PARAM F/U: HCPCS | Performed by: STUDENT IN AN ORGANIZED HEALTH CARE EDUCATION/TRAINING PROGRAM

## 2024-06-24 PROCEDURE — 99213 OFFICE O/P EST LOW 20 MIN: CPT

## 2024-06-24 PROCEDURE — 3077F SYST BP >= 140 MM HG: CPT | Performed by: STUDENT IN AN ORGANIZED HEALTH CARE EDUCATION/TRAINING PROGRAM

## 2024-06-24 PROCEDURE — 1036F TOBACCO NON-USER: CPT | Performed by: STUDENT IN AN ORGANIZED HEALTH CARE EDUCATION/TRAINING PROGRAM

## 2024-06-24 PROCEDURE — 1123F ACP DISCUSS/DSCN MKR DOCD: CPT | Performed by: STUDENT IN AN ORGANIZED HEALTH CARE EDUCATION/TRAINING PROGRAM

## 2024-06-24 PROCEDURE — G8400 PT W/DXA NO RESULTS DOC: HCPCS | Performed by: STUDENT IN AN ORGANIZED HEALTH CARE EDUCATION/TRAINING PROGRAM

## 2024-06-24 PROCEDURE — 1090F PRES/ABSN URINE INCON ASSESS: CPT | Performed by: STUDENT IN AN ORGANIZED HEALTH CARE EDUCATION/TRAINING PROGRAM

## 2024-06-24 RX ORDER — LIDOCAINE 50 MG/G
OINTMENT TOPICAL ONCE
OUTPATIENT
Start: 2024-06-24 | End: 2024-06-24

## 2024-06-24 RX ORDER — ROPINIROLE 4 MG/1
4 TABLET, FILM COATED ORAL NIGHTLY
Qty: 30 TABLET | Refills: 2 | Status: SHIPPED | OUTPATIENT
Start: 2024-06-24

## 2024-06-24 RX ORDER — IBUPROFEN 200 MG
TABLET ORAL ONCE
OUTPATIENT
Start: 2024-06-24 | End: 2024-06-24

## 2024-06-24 RX ORDER — LIDOCAINE 40 MG/G
CREAM TOPICAL ONCE
OUTPATIENT
Start: 2024-06-24 | End: 2024-06-24

## 2024-06-24 RX ORDER — GINSENG 100 MG
CAPSULE ORAL ONCE
OUTPATIENT
Start: 2024-06-24 | End: 2024-06-24

## 2024-06-24 RX ORDER — LIDOCAINE HYDROCHLORIDE 40 MG/ML
SOLUTION TOPICAL ONCE
OUTPATIENT
Start: 2024-06-24 | End: 2024-06-24

## 2024-06-24 RX ORDER — ROPINIROLE 4 MG/1
4 TABLET, FILM COATED ORAL NIGHTLY
COMMUNITY
End: 2024-06-24 | Stop reason: SDUPTHER

## 2024-06-24 RX ORDER — LIDOCAINE HYDROCHLORIDE 20 MG/ML
JELLY TOPICAL ONCE
OUTPATIENT
Start: 2024-06-24 | End: 2024-06-24

## 2024-06-24 RX ORDER — BISACODYL 5 MG/1
5 TABLET, DELAYED RELEASE ORAL 2 TIMES DAILY PRN
Qty: 60 TABLET | Refills: 2 | Status: SHIPPED | OUTPATIENT
Start: 2024-06-24

## 2024-06-24 RX ORDER — DOXYCYCLINE HYCLATE 100 MG/1
100 CAPSULE ORAL 2 TIMES DAILY
Qty: 60 CAPSULE | Refills: 2 | Status: SHIPPED | OUTPATIENT
Start: 2024-06-24

## 2024-06-24 RX ORDER — TRIAMCINOLONE ACETONIDE 1 MG/G
OINTMENT TOPICAL ONCE
OUTPATIENT
Start: 2024-06-24 | End: 2024-06-24

## 2024-06-24 RX ORDER — LIDOCAINE HYDROCHLORIDE 40 MG/ML
SOLUTION TOPICAL ONCE
Status: DISCONTINUED | OUTPATIENT
Start: 2024-06-24 | End: 2024-06-25 | Stop reason: HOSPADM

## 2024-06-24 RX ORDER — ATORVASTATIN CALCIUM 10 MG/1
10 TABLET, FILM COATED ORAL DAILY
Qty: 30 TABLET | Refills: 2 | Status: SHIPPED | OUTPATIENT
Start: 2024-06-24

## 2024-06-24 RX ORDER — SODIUM CHLOR/HYPOCHLOROUS ACID 0.033 %
SOLUTION, IRRIGATION IRRIGATION ONCE
OUTPATIENT
Start: 2024-06-24 | End: 2024-06-24

## 2024-06-24 RX ORDER — HYDRALAZINE HYDROCHLORIDE 25 MG/1
25 TABLET, FILM COATED ORAL 3 TIMES DAILY
COMMUNITY
End: 2024-06-28

## 2024-06-24 RX ORDER — CLOPIDOGREL BISULFATE 75 MG/1
75 TABLET ORAL DAILY
COMMUNITY
End: 2024-06-24 | Stop reason: SDUPTHER

## 2024-06-24 RX ORDER — CLOBETASOL PROPIONATE 0.5 MG/G
OINTMENT TOPICAL ONCE
OUTPATIENT
Start: 2024-06-24 | End: 2024-06-24

## 2024-06-24 RX ORDER — DOXYCYCLINE 100 MG/1
100 CAPSULE ORAL 2 TIMES DAILY
COMMUNITY
End: 2024-06-24

## 2024-06-24 RX ORDER — DOXYCYCLINE HYCLATE 100 MG
100 TABLET ORAL 2 TIMES DAILY
Qty: 180 TABLET | Refills: 0 | Status: SHIPPED | OUTPATIENT
Start: 2024-06-24 | End: 2024-06-24

## 2024-06-24 RX ORDER — CLOPIDOGREL BISULFATE 75 MG/1
75 TABLET ORAL DAILY
Qty: 30 TABLET | Refills: 2 | Status: SHIPPED | OUTPATIENT
Start: 2024-06-24

## 2024-06-24 RX ORDER — BACITRACIN ZINC AND POLYMYXIN B SULFATE 500; 1000 [USP'U]/G; [USP'U]/G
OINTMENT TOPICAL ONCE
OUTPATIENT
Start: 2024-06-24 | End: 2024-06-24

## 2024-06-24 RX ORDER — ATORVASTATIN CALCIUM 10 MG/1
10 TABLET, FILM COATED ORAL DAILY
COMMUNITY
End: 2024-06-24 | Stop reason: SDUPTHER

## 2024-06-24 RX ORDER — BETAMETHASONE DIPROPIONATE 0.5 MG/G
CREAM TOPICAL ONCE
OUTPATIENT
Start: 2024-06-24 | End: 2024-06-24

## 2024-06-24 RX ORDER — GENTAMICIN SULFATE 1 MG/G
OINTMENT TOPICAL ONCE
OUTPATIENT
Start: 2024-06-24 | End: 2024-06-24

## 2024-06-24 ASSESSMENT — ENCOUNTER SYMPTOMS: SHORTNESS OF BREATH: 0

## 2024-06-24 ASSESSMENT — PAIN SCALES - GENERAL: PAINLEVEL_OUTOF10: 0

## 2024-06-24 NOTE — PROGRESS NOTES
Extraocular Movements: Extraocular movements intact.      Pupils: Pupils are equal, round, and reactive to light.   Cardiovascular:      Rate and Rhythm: Normal rate and regular rhythm.      Heart sounds: No murmur heard.     No friction rub. No gallop.   Pulmonary:      Effort: Pulmonary effort is normal. No respiratory distress.      Breath sounds: Normal breath sounds. No wheezing, rhonchi or rales.   Musculoskeletal:      Right lower leg: No edema.      Left lower leg: No edema.   Neurological:      Mental Status: She is alert.      Gait: Gait abnormal (in wheelchair).   Psychiatric:         Mood and Affect: Mood normal.         Behavior: Behavior normal.         Thought Content: Thought content normal.         Judgment: Judgment normal.         Assessment:       Diagnosis Orders   1. History of stroke  atorvastatin (LIPITOR) 10 MG tablet    clopidogrel (PLAVIX) 75 MG tablet      2. Constipation, unspecified constipation type  bisacodyl (DULCOLAX) 5 MG EC tablet      3. Restless legs syndrome  rOPINIRole (REQUIP) 4 MG tablet      4. Infection of prosthetic knee joint, subsequent encounter  doxycycline hyclate (VIBRAMYCIN) 100 MG capsule           Plan:   Renew atorvastatin and clopidogrel for hx of stroke  Renew dulcolax for constipation  Renew ropinirole for RLS  Doxycycline for 3 months due to infected knee prosthetic    No follow-ups on file.    No orders of the defined types were placed in this encounter.    Orders Placed This Encounter   Medications    atorvastatin (LIPITOR) 10 MG tablet     Sig: Take 1 tablet by mouth daily     Dispense:  30 tablet     Refill:  2    bisacodyl (DULCOLAX) 5 MG EC tablet     Sig: Take 1 tablet by mouth 2 times daily as needed for Constipation     Dispense:  60 tablet     Refill:  2    clopidogrel (PLAVIX) 75 MG tablet     Sig: Take 1 tablet by mouth daily     Dispense:  30 tablet     Refill:  2    rOPINIRole (REQUIP) 4 MG tablet     Sig: Take 1 tablet by mouth nightly

## 2024-06-24 NOTE — DISCHARGE INSTRUCTIONS
[facility] WOUND and HYPERBARIC TREATMENT  CENTER                            Visit  Discharge Instructions / Physician Orders  DATE6/24/24     Home Care:     SUPPLIES ORDERED THRU:                     DATE LAST SUPPLIED     Wound Location:  Left Knee Incision-Healed     Cleanse with: Liquid antibacterial soap and water, rinse well      Dressing Orders: Healed     Frequency: change daily.     Additional Orders: Increase protein to diet (meat, cheese, eggs, fish, peanut butter, nuts and beans)  Multivitamin daily   Continue Doxycycline 100 mg 2 x per day for 3 months  Tubi  sock toes to knee wear brace  Call Dr. Keyes's office to see if they have other ideas then the brace as it keeps slipping down  Please obtain Lab work CRP and SED rate prior to next appointment with ( draw the week before appointment)  OFFLOADING [] YES  TYPE:                  [x] NA     Weekly wound care visits until determined otherwise.     Antibiotic therapy-wound care related YES [] NO [x] NA[]     MY CHART []     Smart Device  []      HYPERBARIC TREATMENT-                TREATMENT #                          Your next appointment with the Wound Care Center is in 3 months                                                                                                   (Please note your next appointment above and if you are unable to keep, kindly give a 24 hour notice. Thank you.)  If more than 15 min late we cannot guarantee you will be seen due to clinician schedule  Per Policy, Excessive cancellation will call for dismissal from program.  If you experience any of the following, please call the Wound Care Center during business hours:  [phone]     * Increase in Pain  * Temperature over 101  * Increase in drainage from your wound  * Drainage with a foul odor  * Bleeding  * Increase in swelling  * Need for compression bandage changes due to slippage, breakthrough drainage.     If you need medical attention outside of the

## 2024-06-24 NOTE — PROGRESS NOTES
prosthesis, subsequent encounter            Left knee prosthetic infection         Wound 05/10/24 Knee Left;Anterior #1 (Active)   Wound Image   06/24/24 1013   Wound Etiology Surgical 06/24/24 1013   Dressing Status Dry;Intact 06/24/24 1013   Wound Cleansed Vashe 05/31/24 0838   Dressing/Treatment Other (comment) 05/31/24 1007   Wound Length (cm) 0 cm 06/24/24 1013   Wound Width (cm) 0 cm 06/24/24 1013   Wound Depth (cm) 0 cm 06/24/24 1013   Wound Surface Area (cm^2) 0 cm^2 06/24/24 1013   Change in Wound Size % (l*w) 100 06/24/24 1013   Wound Volume (cm^3) 0 cm^3 06/24/24 1013   Wound Healing % 100 06/24/24 1013   Post-Procedure Length (cm) 0 cm 06/24/24 1013   Post-Procedure Width (cm) 0 cm 06/24/24 1013   Post-Procedure Depth (cm) 0 cm 06/24/24 1013   Post-Procedure Surface Area (cm^2) 0 cm^2 06/24/24 1013   Post-Procedure Volume (cm^3) 0 cm^3 06/24/24 1013   Wound Assessment Pink/red;Slough 05/31/24 0838   Drainage Amount Moderate (25-50%) 05/31/24 0838   Drainage Description Serosanguinous;Yellow 05/31/24 0838   Odor None 05/31/24 0838   [place]-wound Assessment Blanchable erythema 05/31/24 0838   Margins Defined edges 05/31/24 0838   Wound Thickness Description not for Pressure Injury Full thickness 05/31/24 0838   Number of days: 45            Plan:     oral doxycycline 100 mg twice daily for chronic suppression  Follow BUN/creatinine, CBC C-reactive protein and sedimentation rate    Treatment Note please see Discharge Instructions    Written patient dismissal instructions given to patient and signed by patient or POA.       Electronically signed by David Arteaga MD on 6/24/2024 at 10:35 AM

## 2024-06-25 ENCOUNTER — TELEPHONE (OUTPATIENT)
Dept: PRIMARY CARE CLINIC | Age: 84
End: 2024-06-25

## 2024-06-25 ENCOUNTER — CARE COORDINATION (OUTPATIENT)
Dept: CARE COORDINATION | Age: 84
End: 2024-06-25

## 2024-06-25 DIAGNOSIS — M81.0 AGE RELATED OSTEOPOROSIS, UNSPECIFIED PATHOLOGICAL FRACTURE PRESENCE: Primary | ICD-10-CM

## 2024-06-25 DIAGNOSIS — Z86.73 HISTORY OF STROKE: ICD-10-CM

## 2024-06-25 RX ORDER — FUROSEMIDE 20 MG/1
20 TABLET ORAL DAILY
Qty: 30 TABLET | Refills: 3 | Status: SHIPPED | OUTPATIENT
Start: 2024-06-25

## 2024-06-25 NOTE — TELEPHONE ENCOUNTER
DENNIS: Dion the PTA from [facility] called to let the office know that Komal's blood pressures this morning have been elevated 3x. On the left arm he got 170/80 x2. After waiting he took the BP again in the right arm and it was 160/80.     He stated that the patient could not remember if she had taken her BP medication this morning. He also stated that the patient is c/o shakiness, being more forgetful and confused. He wanted you to know that the patient is acting differently from how she usually is when he comes to do her PT. Pt was seen by Dr. Easton on 06/24/24.    Dion can be reached at [phone], if you would like to discuss anything regarding the patient today.     Please advise.
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
## 2024-06-25 NOTE — CARE COORDINATION
Ambulatory Care Coordination Note     2024 5:05 PM     Patient Current Location:  Home: [address] [facility] [address]     ACM contacted the patient by telephone. Verified name and  with patient as identifiers.         ACM: Vivian Driver RN     Challenges to be reviewed by the provider   Additional needs identified to be addressed with provider No  none               Method of communication with provider: none.    Care Summary Note: Patient complains of feeling nervous and wanting to sleep all the time, also has poor appetite and feels chilled. No fever, no n/v, had diarrhea yesterday but none today.  She feels she can't figure her medications out, not sure what she's supposed to take, stated waiting for doctors to prescribe her medications. [facility] nurse Yadira is there now, this writer spoke with her but she is not finding all the medications in patient's home that are on their med profile. She will do a full review and call writer or PCP office back tomorrow. She also has some medication cards from rehab facility.  Vitals today from [facility] nurse: /78, temp 98.9, pulse 74, resp 18, oxygen sat 95%. She thinks patient is very anxious but assessment is ok.  Notes in chart yesterday from nurse and therapist about high blood pressures. Hydralazine listed as a historical med but per [facility] pharmacist, they have never filled it. She used to take isordil, amlodipine and metoprolol succinate, prescribed by Dr. Peña, but they were all discontinued in the hospital in February due to lower blood pressures. She was supposed to follow up with cardiologist in April but missed appt while in rehab facility, next appt is .    Offered patient enrollment in the Remote Patient Monitoring (RPM) program for in-home monitoring: Yes, but did not enroll at this time: declined to enroll in the program because already checks with own equipment .     Assessments Completed:   Diabetes

## 2024-06-26 ENCOUNTER — OFFICE VISIT (OUTPATIENT)
Dept: ORTHOPEDIC SURGERY | Age: 84
End: 2024-06-26

## 2024-06-26 DIAGNOSIS — S82.831A CLOSED FRACTURE OF DISTAL END OF FIBULA WITH TIBIA, RIGHT, INITIAL ENCOUNTER: Primary | ICD-10-CM

## 2024-06-26 DIAGNOSIS — S82.301A CLOSED FRACTURE OF DISTAL END OF FIBULA WITH TIBIA, RIGHT, INITIAL ENCOUNTER: Primary | ICD-10-CM

## 2024-06-26 PROCEDURE — 99024 POSTOP FOLLOW-UP VISIT: CPT | Performed by: ORTHOPAEDIC SURGERY

## 2024-06-26 NOTE — CARE COORDINATION
Called patient, she will call Dr. Palacios's office to schedule sooner appt than 8/20 since also needs clearance for suprapubic cath placement surgery that will be in August with Dr. Lawson. Notified her that she is not currently on any BP medications, need  to monitor BP at home and notify office if consistently high above 160 systolic, which they have not been. Notified nurse ok to visit patient again this week for medication management.

## 2024-06-26 NOTE — PROGRESS NOTES
Komal returns today.  She is status post incision and drainage of left knee after quadriceps tendon rupture subsequent wound irrigation wound VAC subsequent closure Komal is been following with wound care but she finally has is completely healed she states that she has really no pain she still having utilize a brace he still has some quadriceps weakness her biggest complaint has to do with the neuropathy in her feet    Examination of patient's left knee notes that her incision is completely healed over there is minimal redness per se this is a huge improvement of what it is before she still has a pretty good lag when trying to hold her knee all the way straight I can motion her 0 to 90 degrees however without much difficulty or drift discomfort.  No other contributory findings    Plan  Patient is must continue wearing a brace but I showed her how to wear this continually she can take this off at nighttime but must still wear it when she gets up I did ask her to speak with her primary care regarding her to resuming gabapentin as this does sound like it makes her fairly lethargic she has an appointment to come back here on July 24

## 2024-06-27 NOTE — TELEPHONE ENCOUNTER
Left detailed VM on Lala's secured voicemail. Left a voicemail for patient asking for a call back.    DME order faxed to [facility] with supporting documentation.

## 2024-06-28 RX ORDER — GLIPIZIDE 5 MG/1
2.5 TABLET ORAL
Qty: 60 TABLET | Refills: 0
Start: 2024-06-28

## 2024-07-05 ENCOUNTER — CARE COORDINATION (OUTPATIENT)
Dept: CARE COORDINATION | Age: 84
End: 2024-07-05

## 2024-07-05 NOTE — CARE COORDINATION
I will not be increasing lasix, not needed since just one ankle is swelling, not both.   She can try wrapping the ankle  
Notified patient PCP stated unable to increase the Lasix, suggested wrapping the ankle like with an ice wrap and continue to keep elevated.  
  Plan for next ACM outreach in approximately 1 week to complete:  - disease specific assessments  - goal progression  - education .   patient  is agreeable to this plan.

## 2024-07-16 ENCOUNTER — HOSPITAL ENCOUNTER (OUTPATIENT)
Dept: PHYSICAL THERAPY | Age: 84
Setting detail: THERAPIES SERIES
Discharge: HOME | End: 2024-07-16
Payer: MEDICARE

## 2024-07-16 ENCOUNTER — TELEPHONE (OUTPATIENT)
Dept: PRIMARY CARE CLINIC | Age: 84
End: 2024-07-16

## 2024-07-16 DIAGNOSIS — M81.0 OSTEOPOROSIS, POST-MENOPAUSAL: ICD-10-CM

## 2024-07-16 DIAGNOSIS — S82.201S CLOSED FRACTURE OF RIGHT TIBIA AND FIBULA, SEQUELA: Primary | ICD-10-CM

## 2024-07-16 DIAGNOSIS — S82.401S CLOSED FRACTURE OF RIGHT TIBIA AND FIBULA, SEQUELA: Primary | ICD-10-CM

## 2024-07-16 PROCEDURE — 97162 PT EVAL MOD COMPLEX 30 MIN: CPT

## 2024-07-16 PROCEDURE — 97110 THERAPEUTIC EXERCISES: CPT

## 2024-07-16 ASSESSMENT — KOOS JR
RISING FROM SITTING: MODERATE
STANDING UPRIGHT: EXTREME
STRAIGHTENING KNEE FULLY: MODERATE
BENDING TO THE FLOOR TO PICK UP OBJECT: EXTREME
TWISING OR PIVOTING ON KNEE: SEVERE
HOW SEVERE IS YOUR KNEE STIFFNESS AFTER FIRST WAKING IN MORNING: EXTREME
KOOS JR TOTAL INTERVAL SCORE: 28.251
GOING UP OR DOWN STAIRS: EXTREME

## 2024-07-16 NOTE — THERAPY EVALUATION
[facility]   Outpatient Rehabilitation & Therapy  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 She acquired a (L) thigh fracture. (L) ISHAN performed. She was in rehab for the (L) ISHAN and struck a metal object at the facility causing an infection/(L) TKA performed with quad repair. History of SNF/Home care for numerous weeks. Recently followed up with Dr. Keyes and was provided an order for outpatient physical therapy to further address the physical impairments and activity limitations  Past Medical History:   Diagnosis Date    Acquired absence of both cervix and uterus     Acquired absence of other organs     Age-related cognitive decline     Age-related osteoporosis without current pathological fracture     Ankle pain     Left ankle fracture in ortho boat.    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

## 2024-07-16 NOTE — TELEPHONE ENCOUNTER
Dx cannot be Z86.73 needs to be M81.0 confirmed in notes and state where fracture is located. Notes need to rule out that she can't use a cane or walker.  They also need to state she can propel herself from the wheelchair or have assistance at home to help with that.    Please advise.

## 2024-07-17 ENCOUNTER — CARE COORDINATION (OUTPATIENT)
Dept: CARE COORDINATION | Age: 84
End: 2024-07-17

## 2024-07-17 NOTE — ACP (ADVANCE CARE PLANNING)
Advance Care Planning   Healthcare Decision Maker:    Primary Decision Maker: Ritchie Crawford - [facility] - [phone]    Click here to complete Healthcare Decision Makers including selection of the Healthcare Decision Maker Relationship (ie \"Primary\").  Today we documented Decision Maker(s) consistent with Legal Next of Kin hierarchy. Not interested in ACP document discussion at this time.

## 2024-07-17 NOTE — CARE COORDINATION
Ambulatory Care Coordination Note     2024 3:02 PM     Patient Current Location:  Home: [address] [facility] [address]     ACM contacted the patient by telephone. Verified name and  with patient as identifiers.         ACM: Vivian Driver RN     Challenges to be reviewed by the provider   Additional needs identified to be addressed with provider No  none               Method of communication with provider: none.    Care Summary Note: Still with some left ankle swelling. She started outpatient PT yesterday, is done with home health visits.  She is driving now so no problems with transportation to appts.  Has very little pain, feels well.  Blood sugars good, 111 today.  Reviewed all upcoming appts.  Bowels are loose, reminded her she is on long term doxycycline for her knee and Macrobid for her chronic UTIs. She is not complaintive about it, it is not diarrheal, just loose. No abd pain, is eating well.  Blood pressures are stable in 120s and 130s systolic. No dizziness, headaches or other symptoms.    Offered patient enrollment in the Remote Patient Monitoring (RPM) program for in-home monitoring: Yes, but did not enroll at this time: already monitoring with home equipment.     Assessments Completed:   Diabetes Assessment    Medic Alert ID: No  Meal Planning: Avoidance of concentrated sweets   How often do you test your blood sugar?: Daily   Do you have barriers with adherence to non-pharmacologic self-management interventions? (Nutrition/Exercise/Self-Monitoring): Yes   Have you ever had to go to the ED for symptoms of low blood sugar?: No       Do you have hyperglycemia symptoms?: No   Do you have hypoglycemia symptoms?: No   Last Blood Sugar Value: 111   Blood Sugar Monitoring Regimen: Once a Day, Morning Fasting   Blood Sugar Trends: No Change         ,   Hypertension - Encounter Level    Symptoms: hypertension associated peripheral edema: Pos (Comment: Only swelling left ankle)  Symptom

## 2024-07-18 ENCOUNTER — TELEPHONE (OUTPATIENT)
Dept: PRIMARY CARE CLINIC | Age: 84
End: 2024-07-18

## 2024-07-18 NOTE — FLOWSHEET NOTE
[place] Fall Risk Assessment    Risk Factor Scale  Score   History of Falls [x] Yes  [] No 25  0 25   Secondary Diagnosis [] Yes  [x] No 15  0 0   Ambulatory Aid [] Furniture  [x] Crutches/cane/walker  [] None/bedrest/wheelchair/nurse 30  15  0 15   IV/Heparin Lock [] Yes  [x] No 20  0 0   Gait/Transferring [x] Impaired  [] Weak  [] Normal/bedrest/immobile 20  10  0 20   Mental Status [] Forgets limitations  [x] Oriented to own ability 15  0 0      Total: 60     Based on the Assessment score: check the appropriate box.    []  No intervention needed   Low =   Score of 0-24    []  Use standard prevention interventions Moderate =  Score of 24-44   [] Give patient handout and discuss fall prevention strategies   [] Establish goal of education for patient/family RE: fall prevention strategies    [x]  Use high risk prevention interventions High = Score of 45 and higher   [x] Give patient handout and discuss fall prevention strategies   [x] Establish goal of education for patient/family Re: fall prevention strategies   [x] Discuss lifeline / other resources    Electronically signed by:   Dandre Harris PT DPT  Date: 7/16/2024

## 2024-07-18 NOTE — TELEPHONE ENCOUNTER
Ramila with [facility] called in stating they need another amended note for the patient to receive wheelchair stating the patient can not use a hernandez or walker , if it is not specify ruling out she can not use either she will not get approved for wheelchair. Please fax note to [phone] Attn: Ramila

## 2024-07-19 ENCOUNTER — HOSPITAL ENCOUNTER (OUTPATIENT)
Dept: PHYSICAL THERAPY | Age: 84
Setting detail: THERAPIES SERIES
Discharge: HOME | End: 2024-07-19
Payer: MEDICARE

## 2024-07-19 PROCEDURE — 97110 THERAPEUTIC EXERCISES: CPT

## 2024-07-19 NOTE — FLOWSHEET NOTE
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 Reported more difficulty flexing hip from seated position.      Pain:  [x] Yes  [] No Location: L knee   Pain Rating: (0-10 scale)   Currently: 5  At Best: 3/10  At Worst: 7/10  Pain altered Tx:  [x] No  [] Yes  Action:  Comments:    Objective:  Modalities:   Precautions: cognitive decline, osteoporosis, anxiety, CVA - short term memory loss, macular degeneration of (L) eye, major depressive disorder, neuropathy, DM and unsteadiness on feet   Exercises:  Exercise Reps/ Time Weight/ Level Completed  Today Comments   PROM: L knee flexion/extension, Hip ER/IR 10 min total  x    Quad Sets 10 reps x 5\" hold  x    Hamstring Set 10 reps x 5\" hold  x    Supine Heel Slide 15 reps 3\" hold  x    Straight Leg Raise 5 reps x 3 sets  x AAROM   BKFO's 10x2  x           Other:    Passive Stretching:  Supine R hamstring stretch 3 reps x 30 sec     Specific Instructions for next treatment: Continue a therapeutic exercise program to include passive stretching/PROM for the (L) knee/LE. Active motions/strengthening open chain exercises using gravity neutral and gravity resisted positions.

## 2024-07-23 ENCOUNTER — HOSPITAL ENCOUNTER (OUTPATIENT)
Dept: PHYSICAL THERAPY | Age: 84
Setting detail: THERAPIES SERIES
Discharge: HOME | End: 2024-07-23
Payer: MEDICARE

## 2024-07-23 PROCEDURE — 97110 THERAPEUTIC EXERCISES: CPT

## 2024-07-23 PROCEDURE — 97016 VASOPNEUMATIC DEVICE THERAPY: CPT

## 2024-07-23 NOTE — FLOWSHEET NOTE
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      Pain:  [x] Yes  [] No Location: L knee   Pain Rating: (0-10 scale)   Currently: 4  At Best: 3/10  At Worst: 7/10  Pain altered Tx:  [x] No  [] Yes  Action:  Comments:    Objective:  Modalities: Vaso compression to L knee, Low, 46 degrees - 10 minutes  Precautions: cognitive decline, osteoporosis, anxiety, CVA - short term memory loss, macular degeneration of (L) eye, major depressive disorder, neuropathy, DM and unsteadiness on feet   Exercises:  Exercise Reps/ Time Weight/ Level Completed  Today Comments   PROM: L knee flexion/extension, Hip ER/IR 5 min total  x    Quad Sets 10 reps x 5\" hold  x    Hamstring Set 10 reps x 5\" hold  x    Supine Heel Slide 15 reps 3\" hold  x    Straight Leg Raise 5 reps x 3 sets  x AAROM   BKFO's 10x2             Other:    Passive Stretching:  Supine L hamstring stretch 3 reps x 30 sec     Specific Instructions for next treatment: Continue a therapeutic exercise program to include passive stretching/PROM for the (L) knee/LE. Active motions/strengthening open

## 2024-07-24 ENCOUNTER — OFFICE VISIT (OUTPATIENT)
Dept: ORTHOPEDIC SURGERY | Age: 84
End: 2024-07-24

## 2024-07-24 DIAGNOSIS — M97.12XD PERIPROSTHETIC FRACTURE AROUND INTERNAL PROSTHETIC LEFT KNEE JOINT, SUBSEQUENT ENCOUNTER: Primary | ICD-10-CM

## 2024-07-24 PROBLEM — S82.201A CLOSED FRACTURE OF RIGHT FIBULA AND TIBIA: Status: ACTIVE | Noted: 2024-07-24

## 2024-07-24 PROBLEM — S82.401A CLOSED FRACTURE OF RIGHT FIBULA AND TIBIA: Status: ACTIVE | Noted: 2024-07-24

## 2024-07-24 PROCEDURE — 99024 POSTOP FOLLOW-UP VISIT: CPT | Performed by: ORTHOPAEDIC SURGERY

## 2024-07-24 NOTE — TELEPHONE ENCOUNTER
She doesn't have a fracture now, she had one that is healing  Please get fracture site from ortho notes

## 2024-07-24 NOTE — TELEPHONE ENCOUNTER
Per the ortho notes, patient had a closed fracture of distal end of fibula with tibia, right side.

## 2024-07-24 NOTE — PROGRESS NOTES
2016    mild gastritis    UPPER GASTROINTESTINAL ENDOSCOPY N/A 2018    retained thick secretions in proximal esophagus     Family History   Problem Relation Age of Onset    Breast Cancer Mother     Cancer Mother         breast and uterine  41    Heart Disease Father     Heart Attack Father          32    Cancer Maternal Grandmother     Cancer Brother 52        bronchogenic adenocarcinoma cancer    Lung Cancer Brother     Cancer Sister         lung    Lung Cancer Sister          65    Breast Cancer Sister          57    Cancer Sister         breast   Plan  We will open up the patient's brace up to 90 degrees preferred if she still continue with the brace given the overall weakness that she has and she is okay with this.  She does not need to wear the brace when in bed or sitting she is still working on outpatient physical therapy.  I will have the patient back in 1 month's time      Provider Attestation:  I, Damian Keyes, personally performed the services described in this documentation. All medical record entries made by the scribe were at my direction and in my presence. I have reviewed the chart and discharge instructions and agree that the records reflect my personal performance and is accurate and complete. Damian Keyes MD. 24      Please note that this chart was generated using voice recognition Dragon dictation software.  Although every effort was made to ensure the accuracy of this automated transcription, some errors in transcription may have occurred.

## 2024-07-30 NOTE — CARE COORDINATION
Ambulatory Care Coordination Note  12/15/2023    Patient Current Location:  Home: [address]     ACM contacted the patient by telephone. Verified name and  with patient as identifiers. She is progressing well with ambulation, gaining strength. Using walker, fell backwards last week, no injury. Sees ortho next week. Still with cough, is now nonproductive, happens at night when laying down. Taking Mucinex. No wheezing or dyspnea, no leg swelling, weight same at 151. Canceled her pool therapy this week due to the cough. Encouraged to monitor and if worsening to call PCP. She drove for first time today, just around [facility], felt ok with it. She saw Dr. Fela Bueno this week, A1c 6.9. He wants her to check blood sugars at least three times a day, adjusted glipizide dose. CC Plan:   -Follow up in a few weeks to review progress notes, check symptoms, therapy progress, address ACP. Diabetes Assessment    Medic Alert ID: No  Meal Planning: Avoidance of concentrated sweets   How often do you test your blood sugar?: Daily   Do you have barriers with adherence to non-pharmacologic self-management interventions? (Nutrition/Exercise/Self-Monitoring): Yes   Have you ever had to go to the ED for symptoms of low blood sugar?: No       Do you have hyperglycemia symptoms?: No   Do you have hypoglycemia symptoms?: No   Blood Sugar Monitoring Regimen: Once a Day, At Bedtime   Blood Sugar Trends: No Change         and   General Assessment    Do you have any symptoms that are causing concern?: Yes  Progression since Onset: Unchanged  Reported Symptoms: Cough              Offered patient enrollment in the Remote Patient Monitoring (RPM) program for in-home monitoring: Patient declined. Lab Results       None                 Goals Addressed                   This Visit's Progress     Conditions and Symptoms   On track     I will schedule office visits, as directed by my provider.   I will
LUE Tingling and numbness
Mildly to Moderately Impaired: difficulty hearing in some environments or speaker may need to increase volume or speak distinctly

## 2024-07-31 ENCOUNTER — HOSPITAL ENCOUNTER (OUTPATIENT)
Dept: PHYSICAL THERAPY | Age: 84
Setting detail: THERAPIES SERIES
Discharge: HOME | End: 2024-07-31
Payer: MEDICARE

## 2024-07-31 PROCEDURE — 97110 THERAPEUTIC EXERCISES: CPT

## 2024-07-31 PROCEDURE — 97016 VASOPNEUMATIC DEVICE THERAPY: CPT

## 2024-07-31 NOTE — FLOWSHEET NOTE
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 Pt reported feeling 'exasperated' at beginning of treatment. Pt denies pain in L knee but is experiencing achiness in L hip. Pt was been walking more around the house with walker. Pt attempted stairs yesterday and felt like she was going to fall, advised pt against stairs at this time.      Pain:  [x] Yes  [] No Location: L knee   Pain Rating: (0-10 scale)   Currently: 0/10  At Best: 0/10  At Worst: 0/10  Pain altered Tx:  [x] No  [] Yes  Action:  Comments:    Objective:  Modalities: Vaso compression to L knee, Low, 46 degrees - 10 minutes  Precautions: cognitive decline, osteoporosis, anxiety, CVA - short term memory loss, macular degeneration of (L) eye, major depressive disorder, neuropathy, DM and unsteadiness on feet   Exercises:  Exercise Reps/ Time Weight/ Level Completed  Today Comments   NuStep  5 min   ADD           PROM: L knee flexion/extension, Hip ER/IR 5 min total             Quad Sets 10 reps x 5\" hold      Hamstring Set 10 reps x 5\" hold      Supine Heel Slide 15 reps 3\" hold      Straight Leg Raise 5 reps x 3 sets   AAROM   Supine Hip Abduction

## 2024-08-02 ENCOUNTER — APPOINTMENT (OUTPATIENT)
Dept: PHYSICAL THERAPY | Age: 84
End: 2024-08-02
Payer: MEDICARE

## 2024-08-06 ENCOUNTER — HOSPITAL ENCOUNTER (OUTPATIENT)
Dept: PHYSICAL THERAPY | Age: 84
Setting detail: THERAPIES SERIES
Discharge: HOME | End: 2024-08-06
Payer: MEDICARE

## 2024-08-06 PROCEDURE — 97110 THERAPEUTIC EXERCISES: CPT

## 2024-08-06 PROCEDURE — 97016 VASOPNEUMATIC DEVICE THERAPY: CPT

## 2024-08-06 NOTE — FLOWSHEET NOTE
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 Pt stated majority of pain is in L Foot and demonstrated moderate swelling/ edema in proximal ankle. Pt saw podiatrist and was given a temporary ankle brace to correct ankle instability/ medial ankle collapse.     Pain:  [x] Yes  [x] No Location: L knee   Pain Rating: (0-10 scale)   Currently: 0/10  At Best: 0/10  At Worst: 0/10  Pain altered Tx:  [x] No  [] Yes  Action:  Comments: Pt presented to treatment in  and used walker for transfer from <>mat table.    Objective:  Modalities: Vaso compression to L knee, Low, 46 degrees - 10 minutes  Precautions: cognitive decline, osteoporosis, anxiety, CVA - short term memory loss, macular degeneration of (L) eye, major depressive disorder, neuropathy, DM and unsteadiness on feet   Exercises:  Exercise Reps/ Time Weight/ Level Completed  Today Comments   NuStep  5 min L1 x Added /6           PROM: L knee flexion/extension, Hip ER/IR 5 min total  x           Quad Sets 10 reps x 5\" hold  x    Hamstring Set 10 reps x 5\" hold  x    Supine Heel Slide 15 reps 3\" hold      Straight Leg Raise 5 reps x 3 sets  x AAROM   Supine Hip Abduction  10x   x AAROM   BKFO's 10x2  x

## 2024-08-09 ENCOUNTER — HOSPITAL ENCOUNTER (OUTPATIENT)
Dept: PHYSICAL THERAPY | Age: 84
Setting detail: THERAPIES SERIES
Discharge: HOME | End: 2024-08-09
Payer: MEDICARE

## 2024-08-09 PROCEDURE — 97110 THERAPEUTIC EXERCISES: CPT

## 2024-08-09 NOTE — FLOWSHEET NOTE
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 Denied any pain in knee, but did note some L ankle pain.      Pain:  [x] Yes  [x] No Location: L knee   Pain Rating: (0-10 scale)   Currently: 0/10  At Best: 0/10  At Worst: 0/10  Pain altered Tx:  [x] No  [] Yes  Action:  Comments: Pt presented to treatment in  and used walker for transfer from <>mat table.    Objective:  Modalities:  Precautions: cognitive decline, osteoporosis, anxiety, CVA - short term memory loss, macular degeneration of (L) eye, major depressive disorder, neuropathy, DM and unsteadiness on feet   Exercises:  Exercise Reps/ Time Weight/ Level Completed  Today Comments   NuStep  5 min L1 x Added 8/6           PROM: L knee flexion/extension, Hip ER/IR 5 min total  x           Quad Sets 10 reps x 2 sets, 5\" hold  x    Hamstring Set 10 reps x 5\" hold      Supine Heel Slide 15 reps 3\" hold  x    Straight Leg Raise 5 reps x 3 sets  x AAROM   Supine Hip Abduction  10x   x AAROM   BKFO's 10x2  x    SAQ 10 reps x 2 sets  x AAROM   Other:    Passive Stretching:  Supine L hamstring stretch 3 reps x 30 sec     Specific Instructions for next treatment: Continue a

## 2024-08-12 ENCOUNTER — HOSPITAL ENCOUNTER (OUTPATIENT)
Dept: PHYSICAL THERAPY | Age: 84
Setting detail: THERAPIES SERIES
Discharge: HOME | End: 2024-08-12
Payer: MEDICARE

## 2024-08-12 PROCEDURE — 97110 THERAPEUTIC EXERCISES: CPT

## 2024-08-12 NOTE — FLOWSHEET NOTE
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 She has been attempted to stand while performing her ADLs but still needs external support and confidence is still in question. Currently able to stand to pull her pants up and down while using the bathroom with little difficulty.     Pain:  [x] Yes  [x] No Location: (L) knee   Pain Rating: (0-10 scale)   Currently: 0/10  At Best: 0/10  At Worst: 0/10  Pain altered Tx:  [x] No  [] Yes  Action:  Comments:     Objective:  Modalities:  Precautions: cognitive decline, osteoporosis, anxiety, CVA - short term memory loss, macular degeneration of (L) eye, major depressive disorder, neuropathy, DM and unsteadiness on feet   Exercises:  Exercise Reps/ Time Weight/ Level Comments   Semi-reclined (L) knee heel slides 10 reps   AAROM   Gait   22 ft x 2 with RW independently   32 ft x 2 with RW independently     Passive Stretching:  Semi-reclined (L) hamstring stretch 30 sec hold x 3 reps   Semi-reclined (L) gastroc stretch 30 sec hold x 3 reps   Semi-reclined (L) hip adductor stretch 30 sec hold x 3 reps   Semi-reclined (L) IT band stretch 30 sec hold x 3 reps   Semi-reclined (L)

## 2024-08-15 ENCOUNTER — CARE COORDINATION (OUTPATIENT)
Dept: CARE COORDINATION | Age: 84
End: 2024-08-15

## 2024-08-15 DIAGNOSIS — K52.9 CHRONIC DIARRHEA: Primary | ICD-10-CM

## 2024-08-15 NOTE — CARE COORDINATION
Ambulatory Care Coordination Note     8/15/2024 3:51 PM     Patient Current Location:  Home: [address] [facility] [address]     ACM contacted the patient by telephone. Verified name and  with patient as identifiers.         ACM: Vivian Driver RN     Challenges to be reviewed by the provider   Additional needs identified to be addressed with provider Yes  Has had loose stools for several months. Any suggestions on what to take or do about it? On Macrobid continuously by urologist for UTIs and on doxycycline continuous for her knee infection from ID but not sure if they are causing the loose stools or what is.               Method of communication with provider: chart routing.    Care Summary Note: She is doing well. Going to PT, is driving, moving around ok. No falls.  Chronic loose stools most of this year, she can't remember when it started but had it when she was at rehab facility in April which is when she started doxycycline. She can't cough or laugh or hardly move sometimes, has \"leaking\" from rectum. It never becomes solid, is always pudding consistency. Not taking stool softeners or laxatives. She has tried to eat different things like cheese to bind her up but nothing has changed.  Blood sugars doing better since she stopped drinking something that had sugar in it, now less than 140 in mornings.  Has appts with cardiology and ortho next week, sees ID . Neuro office canceled 8/15 appt, is not rescheduled yet.    Offered patient enrollment in the Remote Patient Monitoring (RPM) program for in-home monitoring: Yes, but did not enroll at this time: already monitoring with home equipment.     Assessments Completed:   Diabetes Assessment    Medic Alert ID: No  Meal Planning: Avoidance of concentrated sweets   How often do you test your blood sugar?: Daily   Do you have barriers with adherence to non-pharmacologic self-management interventions? (Nutrition/Exercise/Self-Monitoring): Yes

## 2024-08-15 NOTE — CARE COORDINATION
She can try imodium AD over the counter 1-2 times a day and see if that helps  She should have a stool test to check for clostridium: I will order  She should see me or GI doctor for a rectal exam and belly exam

## 2024-08-15 NOTE — CARE COORDINATION
Notified patient to try imodium AD OTC, she already has some at home.    Notified her of C diff test, she will get done next week when goes to physical therapy next and after that will schedule appt at PCP office.

## 2024-08-19 ENCOUNTER — HOSPITAL ENCOUNTER (OUTPATIENT)
Dept: PHYSICAL THERAPY | Age: 84
Setting detail: THERAPIES SERIES
Discharge: HOME | End: 2024-08-19
Payer: MEDICARE

## 2024-08-19 ENCOUNTER — HOSPITAL ENCOUNTER (OUTPATIENT)
Age: 84
Setting detail: SPECIMEN
Discharge: HOME | End: 2024-08-19

## 2024-08-19 DIAGNOSIS — K52.9 CHRONIC DIARRHEA: ICD-10-CM

## 2024-08-19 NOTE — FLOWSHEET NOTE
[facility]   Outpatient Rehabilitation & Therapy  [address]  P: [phone]   F: [phone]     Physical Therapy Cancel/No Show note    Date: 2024  Patient: Komal Crawford  : 1940  MRN: [id number]    Visit Count:   Cancels/No Shows to date:     For today's appointment patient:    [x]  Cancelled    [] Rescheduled appointment    [] No-show     Reason given by patient:    [x]  Patient ill    []  Conflicting appointment    [] No transportation      [] Conflict with work    [] No reason given    [] Weather related    [] COVID-19    [] Other:      Comments:        [x] Next appointment was confirmed    Electronically signed by: YARELI LARA PTA

## 2024-08-20 LAB
C DIFF GDH + TOXINS A+B STL QL IA.RAPID: NEGATIVE
SPECIMEN DESCRIPTION: NORMAL

## 2024-08-21 ENCOUNTER — OFFICE VISIT (OUTPATIENT)
Dept: ORTHOPEDIC SURGERY | Age: 84
End: 2024-08-21
Payer: MEDICARE

## 2024-08-21 ENCOUNTER — HOSPITAL ENCOUNTER (OUTPATIENT)
Dept: PHYSICAL THERAPY | Age: 84
Setting detail: THERAPIES SERIES
Discharge: HOME | End: 2024-08-21
Payer: MEDICARE

## 2024-08-21 DIAGNOSIS — Z96.652 HISTORY OF TOTAL KNEE ARTHROPLASTY, LEFT: ICD-10-CM

## 2024-08-21 DIAGNOSIS — M97.12XD PERIPROSTHETIC FRACTURE AROUND INTERNAL PROSTHETIC LEFT KNEE JOINT, SUBSEQUENT ENCOUNTER: Primary | ICD-10-CM

## 2024-08-21 PROCEDURE — 1036F TOBACCO NON-USER: CPT | Performed by: ORTHOPAEDIC SURGERY

## 2024-08-21 PROCEDURE — 1090F PRES/ABSN URINE INCON ASSESS: CPT | Performed by: ORTHOPAEDIC SURGERY

## 2024-08-21 PROCEDURE — 97016 VASOPNEUMATIC DEVICE THERAPY: CPT

## 2024-08-21 PROCEDURE — 99213 OFFICE O/P EST LOW 20 MIN: CPT | Performed by: ORTHOPAEDIC SURGERY

## 2024-08-21 PROCEDURE — G8417 CALC BMI ABV UP PARAM F/U: HCPCS | Performed by: ORTHOPAEDIC SURGERY

## 2024-08-21 PROCEDURE — 1123F ACP DISCUSS/DSCN MKR DOCD: CPT | Performed by: ORTHOPAEDIC SURGERY

## 2024-08-21 PROCEDURE — G8428 CUR MEDS NOT DOCUMENT: HCPCS | Performed by: ORTHOPAEDIC SURGERY

## 2024-08-21 PROCEDURE — G8400 PT W/DXA NO RESULTS DOC: HCPCS | Performed by: ORTHOPAEDIC SURGERY

## 2024-08-21 PROCEDURE — 97110 THERAPEUTIC EXERCISES: CPT

## 2024-08-21 NOTE — PROGRESS NOTES
GASTROINTESTINAL ENDOSCOPY      UPPER GASTROINTESTINAL ENDOSCOPY  2014    UPPER GASTROINTESTINAL ENDOSCOPY  2016    mild gastritis    UPPER GASTROINTESTINAL ENDOSCOPY N/A 2018    retained thick secretions in proximal esophagus     Family History   Problem Relation Age of Onset    Breast Cancer Mother     Cancer Mother         breast and uterine  41    Heart Disease Father     Heart Attack Father          32    Cancer Maternal Grandmother     Cancer Brother 52        bronchogenic adenocarcinoma cancer    Lung Cancer Brother     Cancer Sister         lung    Lung Cancer Sister          65    Breast Cancer Sister          57    Cancer Sister         breast   Plan  I recommend to the patient that she remain on the brace at all times to alert on border to avoid a fall.  Will see her back here in 6 months call if any problems prior to that time      Provider Attestation:  I, Damian Keyes, personally performed the services described in this documentation. All medical record entries made by the scribe were at my direction and in my presence. I have reviewed the chart and discharge instructions and agree that the records reflect my personal performance and is accurate and complete. Damian Keyes MD. 24      Please note that this chart was generated using voice recognition Dragon dictation software.  Although every effort was made to ensure the accuracy of this automated transcription, some errors in transcription may have occurred.

## 2024-08-21 NOTE — FLOWSHEET NOTE
performing her desired ADLs.  []  []  []      3. ? Strength: Increase strength in all involved  motions to 3-4/5  to return to function without limitations []  []  []      4. ? ROM: Patient will demonstrate full ROM within all  involved motions to assist with function. []  []  []      5. Patient will demonstrate independence with her home exercise program at discharge.  []  []  []           Patient goals: To walk    Pt. Education:  [x] Yes  [] No  [x] Reviewed Prior HEP/Ed  Method of Education: [x] Verbal  [x] Demo  [x] Written  Comprehension of Education:  [x] Verbalizes understanding.  [x] Demonstrates understanding.  [] Needs review.  [x] Demonstrates/verbalizes HEP/Ed previously given.    Discussed with patient ways to work on knee mobility at home, most notably heel slides     Access Code: [email or web address]  URL: [email or web address]  Date: 08/21/2024  Prepared by: Laura Stauffer    Exercises  - Supine Quad Set  - 1 x daily - 7 x weekly - 3 sets - 10 reps  - Supine Heel Slide  - 1 x daily - 7 x weekly - 3 sets - 10 reps  - Long Sitting Hamstring Set  - 1 x daily - 7 x weekly - 3 sets - 10 reps  - Seated Long Arc Quad  - 1 x daily - 7 x weekly - 3 sets - 10 reps  - Seated Heel Raise  - 1 x daily - 7 x weekly - 2 sets - 10 reps  - Seated March  - 1 x daily - 7 x weekly - 2 sets - 10 reps    Plan: [x] Continue per plan of care.   [] Other:      Treatment Charges: Mins Units   []  Modalities     [x]  Ther Exercise 45 3   []  Manual Therapy     []  Ther Activities     []  Aquatics     []  Neuromuscular     [x] Vasocompression 10 1   [] Gait Training     [] Dry needling        [] 1 or 2 muscles        [] 3 or more muscles     []  Other     Total Billable time on timed codes  45 4   5' un-timed for transfers     Time In: 10:17 am            Time Out: 11:17 am    Electronically signed by:  LAURA STAUFFER PTA

## 2024-08-26 ENCOUNTER — HOSPITAL ENCOUNTER (OUTPATIENT)
Dept: PHYSICAL THERAPY | Age: 84
Setting detail: THERAPIES SERIES
Discharge: HOME | End: 2024-08-26
Payer: MEDICARE

## 2024-08-26 PROCEDURE — 97110 THERAPEUTIC EXERCISES: CPT

## 2024-08-26 PROCEDURE — 97016 VASOPNEUMATIC DEVICE THERAPY: CPT

## 2024-08-26 NOTE — FLOWSHEET NOTE
Knee ROM:  0-93 degrees     Specific Instructions for next treatment: Continue a therapeutic exercise program to include passive stretching/PROM for the (L) knee/LE. Active motions/strengthening open chain exercises using gravity neutral and gravity resisted positions.     Assessment: [] Progressing toward goals.    [] No change.     [x] Other: Session limited due to provider schedule and patient unable to come in 15' earlier. Began session with transfer to table, followed by assessing the area of pain. Some redness on the L lateral heel, but no blistering present. Began with stretching of the knee, followed by quad strengthening exercises. Discussed contacting her provider for assistance at home in order to help with dressing, bathing, etc. As she notes this is problematic. Patient agreeable to this. Discussed continuing with HEP given at last session. Ended with vaso for edema management.     [x] Patient would continue to benefit from skilled physical therapy services in order to: Decrease pain, and improve LLE function required to walk per patient's goals.     Goals  MET NOT MET ON-  GOING  Details   STG: To be met in 9 treatments            1. ? Pain: Decrease pain levels to 4-5/10 when completing her desired ADLs []  []  []      2. ? ROM: Patient will demonstrate improved ROM by 10 degrees in all involved motions to assist with function. []  []  []      3. ? Strength: Increase strength within the (L) hip  motions to 3-/5 to 3/5 MMT to ease functional limitations and mobility  []  []  []      4. Demonstrate knowledge of fall risk prevention  []  []  []        []  []  []      LTG: To be met in 18 treatments           1. Improve score on functional assessment tool KOOS-Jr by 10 points.  []  []  []      2. No complaints of pain will be reported within the (L) knee/LE at rest/while performing her desired ADLs.  []  []  []      3. ? Strength: Increase strength in all involved  motions to 3-4/5  to return to function

## 2024-08-27 ENCOUNTER — TELEPHONE (OUTPATIENT)
Dept: PRIMARY CARE CLINIC | Age: 84
End: 2024-08-27

## 2024-08-27 NOTE — TELEPHONE ENCOUNTER
Patient states she has about 6-9 months of rehab left and she is not able to walk. Patient is requesting assistance for bathing and ADLs.    Patient advised to call her insurance to see which service provider will be covered under insurance. Patient will have this info when we call back.    Okay for referral?

## 2024-08-28 NOTE — TELEPHONE ENCOUNTER
Ok for referral. Usually has to be under home health care agency unless their income is low enough that they qualify for passport program.

## 2024-08-29 ENCOUNTER — HOSPITAL ENCOUNTER (OUTPATIENT)
Dept: PHYSICAL THERAPY | Age: 84
Setting detail: THERAPIES SERIES
Discharge: HOME | End: 2024-08-29
Payer: MEDICARE

## 2024-08-29 NOTE — FLOWSHEET NOTE
[facility]   Outpatient Rehabilitation & Therapy  [address]  P: [phone]   F: [phone]     Physical Therapy Cancel/No Show note    Date: 2024  Patient: Komal Crawford  : 1940  MRN: [id number]    Visit Count:   Cancels/No Shows to date:     For today's appointment patient:    [x]  Cancelled    [] Rescheduled appointment    [] No-show     Reason given by patient:    [x]  Patient ill    []  Conflicting appointment    [] No transportation      [] Conflict with work    [] No reason given    [] Weather related    [] COVID-19    [] Other:      Comments:        [] Next appointment was confirmed    Electronically signed by: Dandre Harris PT DPT

## 2024-09-04 ENCOUNTER — HOSPITAL ENCOUNTER (OUTPATIENT)
Dept: PHYSICAL THERAPY | Age: 84
Setting detail: THERAPIES SERIES
Discharge: HOME | End: 2024-09-04
Payer: MEDICARE

## 2024-09-04 PROCEDURE — 97110 THERAPEUTIC EXERCISES: CPT

## 2024-09-04 NOTE — FLOWSHEET NOTE
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 Pt was prescribed a L ankle orthotic, which has helped with stability while walking. Pt tried to call her provider regarding assistance with home adl's, office did not answer and pt left a voicemail. Pt has been compliment with HEP.     Pain:  [x] Yes  [] No Location: L Knee   Pain Rating: (0-10 scale)   Currently: 6/10  At Best: 0/10  At Worst: 10/10  Pain altered Tx:  [x] No  [] Yes  Action:  Comments:     Objective: Walker used for transfers   Modalities: Vasocompression to (L) knee, 40 degrees, low pressure - 10' (Held)   Precautions: cognitive decline, osteoporosis, anxiety, CVA - short term memory loss, macular degeneration of (L) eye, major depressive disorder, neuropathy, DM and unsteadiness on feet   Exercises:  Exercise Reps/ Time Weight/ Level Completed  Today Comments   NuStep  5 min L2 x Added 8/6   Progress          Semi-Reclined (L) knee heel slides  15 reps       Therapist assisted hip flexion  10 reps x 2 sets   x    Therapist assisted supine hip abduction  10 reps x 2 sets   x    Quad Sets 10 reps x 2 sets, 5\" hold  x    Hamstring Set 10 reps x 5\" hold  x    Glute Sets  10 reps x 5\" hold  x

## 2024-09-05 ENCOUNTER — ANESTHESIA EVENT (OUTPATIENT)
Dept: OPERATING ROOM | Age: 84
End: 2024-09-05
Payer: MEDICARE

## 2024-09-06 ENCOUNTER — HOSPITAL ENCOUNTER (OUTPATIENT)
Age: 84
Setting detail: OUTPATIENT SURGERY
Discharge: HOME | End: 2024-09-06
Attending: SPECIALIST | Admitting: SPECIALIST
Payer: MEDICARE

## 2024-09-06 ENCOUNTER — ANESTHESIA (OUTPATIENT)
Dept: OPERATING ROOM | Age: 84
End: 2024-09-06
Payer: MEDICARE

## 2024-09-06 VITALS
OXYGEN SATURATION: 98 % | SYSTOLIC BLOOD PRESSURE: 181 MMHG | HEART RATE: 74 BPM | WEIGHT: 150 LBS | HEIGHT: 57 IN | RESPIRATION RATE: 20 BRPM | BODY MASS INDEX: 32.36 KG/M2 | TEMPERATURE: 97.7 F | DIASTOLIC BLOOD PRESSURE: 75 MMHG

## 2024-09-06 DIAGNOSIS — R33.9 INCOMPLETE BLADDER EMPTYING: ICD-10-CM

## 2024-09-06 LAB — GLUCOSE BLD-MCNC: 135 MG/DL (ref 65–105)

## 2024-09-06 PROCEDURE — 6360000002 HC RX W HCPCS: Performed by: NURSE ANESTHETIST, CERTIFIED REGISTERED

## 2024-09-06 PROCEDURE — 6370000000 HC RX 637 (ALT 250 FOR IP): Performed by: SPECIALIST

## 2024-09-06 PROCEDURE — 7100000000 HC PACU RECOVERY - FIRST 15 MIN: Performed by: SPECIALIST

## 2024-09-06 PROCEDURE — 7100000001 HC PACU RECOVERY - ADDTL 15 MIN: Performed by: SPECIALIST

## 2024-09-06 PROCEDURE — 3600000014 HC SURGERY LEVEL 4 ADDTL 15MIN: Performed by: SPECIALIST

## 2024-09-06 PROCEDURE — 7100000011 HC PHASE II RECOVERY - ADDTL 15 MIN: Performed by: SPECIALIST

## 2024-09-06 PROCEDURE — 87086 URINE CULTURE/COLONY COUNT: CPT

## 2024-09-06 PROCEDURE — 2580000003 HC RX 258: Performed by: STUDENT IN AN ORGANIZED HEALTH CARE EDUCATION/TRAINING PROGRAM

## 2024-09-06 PROCEDURE — 3700000001 HC ADD 15 MINUTES (ANESTHESIA): Performed by: SPECIALIST

## 2024-09-06 PROCEDURE — 3700000000 HC ANESTHESIA ATTENDED CARE: Performed by: SPECIALIST

## 2024-09-06 PROCEDURE — 7100000010 HC PHASE II RECOVERY - FIRST 15 MIN: Performed by: SPECIALIST

## 2024-09-06 PROCEDURE — 2500000003 HC RX 250 WO HCPCS: Performed by: NURSE ANESTHETIST, CERTIFIED REGISTERED

## 2024-09-06 PROCEDURE — 3600000004 HC SURGERY LEVEL 4 BASE: Performed by: SPECIALIST

## 2024-09-06 PROCEDURE — 51102 DRAIN BL W/CATH INSERTION: CPT | Performed by: SPECIALIST

## 2024-09-06 PROCEDURE — 2709999900 HC NON-CHARGEABLE SUPPLY: Performed by: SPECIALIST

## 2024-09-06 PROCEDURE — C1894 INTRO/SHEATH, NON-LASER: HCPCS | Performed by: SPECIALIST

## 2024-09-06 PROCEDURE — 6360000002 HC RX W HCPCS: Performed by: SPECIALIST

## 2024-09-06 PROCEDURE — 82947 ASSAY GLUCOSE BLOOD QUANT: CPT

## 2024-09-06 RX ORDER — PROPOFOL 10 MG/ML
INJECTION, EMULSION INTRAVENOUS PRN
Status: DISCONTINUED | OUTPATIENT
Start: 2024-09-06 | End: 2024-09-06 | Stop reason: SDUPTHER

## 2024-09-06 RX ORDER — GINSENG 100 MG
CAPSULE ORAL
Status: DISCONTINUED
Start: 2024-09-06 | End: 2024-09-06 | Stop reason: HOSPADM

## 2024-09-06 RX ORDER — FENTANYL CITRATE 50 UG/ML
INJECTION, SOLUTION INTRAMUSCULAR; INTRAVENOUS PRN
Status: DISCONTINUED | OUTPATIENT
Start: 2024-09-06 | End: 2024-09-06 | Stop reason: SDUPTHER

## 2024-09-06 RX ORDER — GENTAMICIN SULFATE 80 MG/50ML
80 INJECTION, SOLUTION INTRAVENOUS
Status: COMPLETED | OUTPATIENT
Start: 2024-09-06 | End: 2024-09-06

## 2024-09-06 RX ORDER — GLYCOPYRROLATE 0.2 MG/ML
0.4 INJECTION INTRAMUSCULAR; INTRAVENOUS ONCE
Status: DISCONTINUED | OUTPATIENT
Start: 2024-09-06 | End: 2024-09-06 | Stop reason: HOSPADM

## 2024-09-06 RX ORDER — LABETALOL HYDROCHLORIDE 5 MG/ML
10 INJECTION, SOLUTION INTRAVENOUS
Status: DISCONTINUED | OUTPATIENT
Start: 2024-09-06 | End: 2024-09-06 | Stop reason: HOSPADM

## 2024-09-06 RX ORDER — DEXAMETHASONE SODIUM PHOSPHATE 10 MG/ML
INJECTION, SOLUTION INTRAMUSCULAR; INTRAVENOUS PRN
Status: DISCONTINUED | OUTPATIENT
Start: 2024-09-06 | End: 2024-09-06 | Stop reason: SDUPTHER

## 2024-09-06 RX ORDER — LIDOCAINE HYDROCHLORIDE 10 MG/ML
1 INJECTION, SOLUTION EPIDURAL; INFILTRATION; INTRACAUDAL; PERINEURAL
Status: DISCONTINUED | OUTPATIENT
Start: 2024-09-06 | End: 2024-09-06 | Stop reason: HOSPADM

## 2024-09-06 RX ORDER — OXYCODONE AND ACETAMINOPHEN 5; 325 MG/1; MG/1
1 TABLET ORAL
Status: DISCONTINUED | OUTPATIENT
Start: 2024-09-06 | End: 2024-09-06 | Stop reason: HOSPADM

## 2024-09-06 RX ORDER — HYDRALAZINE HYDROCHLORIDE 20 MG/ML
10 INJECTION INTRAMUSCULAR; INTRAVENOUS
Status: DISCONTINUED | OUTPATIENT
Start: 2024-09-06 | End: 2024-09-06 | Stop reason: HOSPADM

## 2024-09-06 RX ORDER — GENTAMICIN SULFATE 80 MG/50ML
INJECTION, SOLUTION INTRAVENOUS
Status: COMPLETED
Start: 2024-09-06 | End: 2024-09-06

## 2024-09-06 RX ORDER — SODIUM CHLORIDE, SODIUM LACTATE, POTASSIUM CHLORIDE, CALCIUM CHLORIDE 600; 310; 30; 20 MG/100ML; MG/100ML; MG/100ML; MG/100ML
INJECTION, SOLUTION INTRAVENOUS CONTINUOUS
Status: DISCONTINUED | OUTPATIENT
Start: 2024-09-06 | End: 2024-09-06 | Stop reason: HOSPADM

## 2024-09-06 RX ORDER — DIPHENHYDRAMINE HYDROCHLORIDE 50 MG/ML
12.5 INJECTION INTRAMUSCULAR; INTRAVENOUS
Status: DISCONTINUED | OUTPATIENT
Start: 2024-09-06 | End: 2024-09-06 | Stop reason: HOSPADM

## 2024-09-06 RX ORDER — SODIUM CHLORIDE 0.9 % (FLUSH) 0.9 %
5-40 SYRINGE (ML) INJECTION EVERY 12 HOURS SCHEDULED
Status: DISCONTINUED | OUTPATIENT
Start: 2024-09-06 | End: 2024-09-06 | Stop reason: HOSPADM

## 2024-09-06 RX ORDER — SODIUM CHLORIDE 9 MG/ML
INJECTION, SOLUTION INTRAVENOUS PRN
Status: DISCONTINUED | OUTPATIENT
Start: 2024-09-06 | End: 2024-09-06 | Stop reason: HOSPADM

## 2024-09-06 RX ORDER — OXYCODONE AND ACETAMINOPHEN 5; 325 MG/1; MG/1
2 TABLET ORAL
Status: DISCONTINUED | OUTPATIENT
Start: 2024-09-06 | End: 2024-09-06 | Stop reason: HOSPADM

## 2024-09-06 RX ORDER — LIDOCAINE HYDROCHLORIDE 10 MG/ML
INJECTION, SOLUTION INFILTRATION; PERINEURAL PRN
Status: DISCONTINUED | OUTPATIENT
Start: 2024-09-06 | End: 2024-09-06 | Stop reason: SDUPTHER

## 2024-09-06 RX ORDER — ONDANSETRON 2 MG/ML
4 INJECTION INTRAMUSCULAR; INTRAVENOUS
Status: DISCONTINUED | OUTPATIENT
Start: 2024-09-06 | End: 2024-09-06 | Stop reason: HOSPADM

## 2024-09-06 RX ORDER — NALOXONE HYDROCHLORIDE 0.4 MG/ML
INJECTION, SOLUTION INTRAMUSCULAR; INTRAVENOUS; SUBCUTANEOUS PRN
Status: DISCONTINUED | OUTPATIENT
Start: 2024-09-06 | End: 2024-09-06 | Stop reason: HOSPADM

## 2024-09-06 RX ORDER — SODIUM CHLORIDE 0.9 % (FLUSH) 0.9 %
5-40 SYRINGE (ML) INJECTION PRN
Status: DISCONTINUED | OUTPATIENT
Start: 2024-09-06 | End: 2024-09-06 | Stop reason: HOSPADM

## 2024-09-06 RX ORDER — BUPIVACAINE HYDROCHLORIDE 5 MG/ML
INJECTION, SOLUTION EPIDURAL; INTRACAUDAL
Status: DISCONTINUED
Start: 2024-09-06 | End: 2024-09-06 | Stop reason: HOSPADM

## 2024-09-06 RX ORDER — METOCLOPRAMIDE HYDROCHLORIDE 5 MG/ML
10 INJECTION INTRAMUSCULAR; INTRAVENOUS
Status: DISCONTINUED | OUTPATIENT
Start: 2024-09-06 | End: 2024-09-06 | Stop reason: HOSPADM

## 2024-09-06 RX ORDER — IPRATROPIUM BROMIDE AND ALBUTEROL SULFATE 2.5; .5 MG/3ML; MG/3ML
1 SOLUTION RESPIRATORY (INHALATION)
Status: DISCONTINUED | OUTPATIENT
Start: 2024-09-06 | End: 2024-09-06 | Stop reason: HOSPADM

## 2024-09-06 RX ORDER — ONDANSETRON 2 MG/ML
INJECTION INTRAMUSCULAR; INTRAVENOUS PRN
Status: DISCONTINUED | OUTPATIENT
Start: 2024-09-06 | End: 2024-09-06 | Stop reason: SDUPTHER

## 2024-09-06 RX ORDER — GINSENG 100 MG
CAPSULE ORAL PRN
Status: DISCONTINUED | OUTPATIENT
Start: 2024-09-06 | End: 2024-09-06 | Stop reason: ALTCHOICE

## 2024-09-06 RX ADMIN — ONDANSETRON 4 MG: 2 INJECTION INTRAMUSCULAR; INTRAVENOUS at 12:18

## 2024-09-06 RX ADMIN — FENTANYL CITRATE 25 MCG: 50 INJECTION, SOLUTION INTRAMUSCULAR; INTRAVENOUS at 12:12

## 2024-09-06 RX ADMIN — GENTAMICIN SULFATE 80 MG: 80 INJECTION, SOLUTION INTRAVENOUS at 12:12

## 2024-09-06 RX ADMIN — FENTANYL CITRATE 25 MCG: 50 INJECTION, SOLUTION INTRAMUSCULAR; INTRAVENOUS at 12:21

## 2024-09-06 RX ADMIN — LIDOCAINE HYDROCHLORIDE 40 MG: 10 INJECTION, SOLUTION INFILTRATION; PERINEURAL at 12:07

## 2024-09-06 RX ADMIN — SODIUM CHLORIDE: 9 INJECTION, SOLUTION INTRAVENOUS at 12:02

## 2024-09-06 RX ADMIN — FENTANYL CITRATE 50 MCG: 50 INJECTION, SOLUTION INTRAMUSCULAR; INTRAVENOUS at 12:07

## 2024-09-06 RX ADMIN — DEXAMETHASONE SODIUM PHOSPHATE 5 MG: 10 INJECTION, SOLUTION INTRAMUSCULAR; INTRAVENOUS at 12:07

## 2024-09-06 RX ADMIN — PROPOFOL 100 MG: 10 INJECTION, EMULSION INTRAVENOUS at 12:07

## 2024-09-06 ASSESSMENT — PAIN SCALES - GENERAL: PAINLEVEL_OUTOF10: 0

## 2024-09-06 ASSESSMENT — PAIN - FUNCTIONAL ASSESSMENT: PAIN_FUNCTIONAL_ASSESSMENT: 0-10

## 2024-09-06 NOTE — OP NOTE
Operative Note      Patient: Komal Crawford  YOB: 1940  MRN: [id number]    Date of Procedure: 9/6/2024    Pre-Op Diagnosis Codes:      * Incomplete bladder emptying [R33.9]    Post-Op Diagnosis: Same       Procedure(s):  CYSTOSCOPY SUPRAPUBIC TUBE PLACEMENT    Surgeon(s):  Omega Lawson MD    Assistant:   * No surgical staff found *    Anesthesia: General    Estimated Blood Loss (mL): Minimal    Complications: None    Specimens:   * No specimens in log *    Implants:  * No implants in log *      Drains: * No LDAs found *    Findings:  Infection Present At Time Of Surgery (PATOS) (choose all levels that have infection present):  No infection present  Other Findings: see below     Detailed Description of Procedure:   NDICATIONS FOR THE PROCEDURE:  Komal Crawford is a 84 y.o. female with a history of incomplete bladder emptying and inability to perform intermittent straight catheterization.  After treatment options were discussed including risks, benefits, alternatives, goals and possible complications,  the patient elected to proceed with today's procedure.  Patient given Gentamicin 80 mg IV on call to OR.     NARRATIVE SUMMARY OF THE PROCEDURE:  After informed consent was reviewed in the preoperative area the patient was taken back to the operating room and transferred to the operating table in the supine position. EPC cuffs were placed and the machine was turned on. Anesthesia was induced and the antibiotics were started. Patient was placed in modified dorsal lithotomy position, sterilely prepped and draped in a standard fashion. We entered the urethra with the cystoscope and advanced into the bladder easily. We then used a spinal needle to project our trajectory of the trochar.  We could see the needle going into the dome of the bladder. We then made a small skin incision using a 15 blade scalpel. Using a Eduardo trochar we then entered the bladder under direct visualization. The stylet was removed and

## 2024-09-06 NOTE — ANESTHESIA PRE PROCEDURE
Department of Anesthesiology  Preprocedure Note       Name:  Komal Crawford   Age:  84 y.o.  :  1940                                          MRN:  [id number]         Date:  2024      Surgeon: Surgeon(s):  Omega Lawson MD    Procedure: Procedure(s):  CYSTOSCOPY SUPRAPUBIC TUBE PLACEMENT    Medications prior to admission:   Prior to Admission medications    Medication Sig Start Date End Date Taking? Authorizing Provider   glipiZIDE (GLUCOTROL) 5 MG tablet Take 0.5 tablets by mouth 2 times daily (before meals) prescribed by Dr. Roy's office 24  Yes Sabiha Bedolla MD   furosemide (LASIX) 20 MG tablet Take 1 tablet by mouth daily 24  Yes Dylan Omalley MD   atorvastatin (LIPITOR) 10 MG tablet Take 1 tablet by mouth daily 24  Yes Juan Easton DO   clopidogrel (PLAVIX) 75 MG tablet Take 1 tablet by mouth daily 24  Yes Juan Easton DO   rOPINIRole (REQUIP) 4 MG tablet Take 1 tablet by mouth nightly 24  Yes Juan Easton DO   midodrine (PROAMATINE) 2.5 MG tablet Take 1 tablet by mouth 3 times daily Every 8 hrs prn  24 Per [facility], they have have never had a prescription for this   Yes Ana Carrington MD   nitrofurantoin, macrocrystal-monohydrate, (MACROBID) 100 MG capsule Take 1 capsule by mouth daily 24  Yes Omega Lawson MD   nitroGLYCERIN (NITROSTAT) 0.4 MG SL tablet  22  Yes Ana Carrington MD   ipratropium (ATROVENT) 0.02 % nebulizer solution Inhale 2.5 mLs into the lungs 19  Yes Ana Carrington MD   Multiple Vitamins-Minerals (THERAPEUTIC MULTIVITAMIN-MINERALS) tablet Take 1 tablet by mouth daily   Yes Ana Carrington MD   omeprazole (PRILOSEC) 20 MG delayed release capsule Take 1 capsule by mouth daily   Yes Ana Carrington MD   senna (SENOKOT) 8.6 MG tablet Take 1 tablet by mouth 2 times daily For constipation   Yes Ana Carrington MD   aspirin 81 MG chewable tablet Take 1 tablet by

## 2024-09-06 NOTE — DISCHARGE INSTRUCTIONS
Activity  You have had anesthesia today  Do not drive, operate heavy equipment, consume alcoholic beverages, or make any important decisions  for 24 hours   If you are taking pain medication: Do not drive or consume alcohol.  Take your time changing positions today. You may feel light headed or dizzy if you move too quickly.   Continue your home medications as ordered by your physician.  Diet   You can eat your normal diet when you feel well. You should start off with bland foods like chicken soup, toast, or yogurt. Then advance as tolerated.  Drink plenty of fluids (unless your doctor tells you not to). Your urine should be very lightly colored without a strong odor.       Patient to keep Suprapubic (SP) catheter insertion site dressing clean and dry for 24 hours.  OK to remove dressing after 24 hours, then keep insertion site clean with soap and water daily.    Use over the counter topical neosporin antibacterial ointment around entrance site daily until first postoperative office visit.  Place a 4x4 gauze dressing daily after cleaning site and applying neosporin.  Call Roosevelt Lawson M.D.'s office to set up suture removal in 7-10 days AND Suprapubic (SP) catheter exchange in 4 weeks.

## 2024-09-06 NOTE — ANESTHESIA POSTPROCEDURE EVALUATION
Department of Anesthesiology  Postprocedure Note    Patient: Komal Crawford  MRN: [id number]  YOB: 1940  Date of evaluation: 9/6/2024    Procedure Summary       Date: 09/06/24 Room / Location: [address]    Anesthesia Start: 1202 Anesthesia Stop: 1239    Procedure: CYSTOSCOPY SUPRAPUBIC TUBE PLACEMENT Diagnosis:       Incomplete bladder emptying      (Incomplete bladder emptying [R33.9])    Surgeons: Omega Lawson MD Responsible Provider: Harris Raymond MD    Anesthesia Type: general ASA Status: 4            Anesthesia Type: No value filed.    Lelo Phase I: Lelo Score: 9    Lelo Phase II:      Anesthesia Post Evaluation    Patient location during evaluation: PACU  Patient participation: complete - patient participated  Level of consciousness: awake and alert  Airway patency: patent  Nausea & Vomiting: no nausea and no vomiting  Cardiovascular status: bradycardic  Respiratory status: room air and spontaneous ventilation  Hydration status: euvolemic  Multimodal analgesia pain management approach  Pain management: adequate    No notable events documented.

## 2024-09-06 NOTE — PROGRESS NOTES
Spiritual Health Assessment/Progress Note  [facility]    (P) Initial Encounter, Spiritual/Emotional Needs, (P) Emotional distress, (P) Life Adjustments, Adjustment to illness,      Name: Komal Crawford MRN: [id number]    Age: 84 y.o.     Sex: female   Language: English   Religious: Confucianism   <principal problem not specified>     Date: 9/6/2024            Total Time Calculated: (P) 20 min              Spiritual Assessment began in [facility] OR            Encounter Overview/Reason: (P) Initial Encounter, Spiritual/Emotional Needs  Service Provided For: (P) Patient, Significant other       visited Pt in OR recovery. Pt and family were pleasant and welcoming. Pt was open to conversation. Provided support and active listening during visit.  offered conversation and encouragement. Pt was feeling better and at peace. Very nice family.    Kinsey, Belief, Meaning:   Patient identifies as spiritual and is connected with a kinsey tradition or spiritual practice  Family/Friends have beliefs or practices that help with coping during difficult times      Importance and Influence:  Patient has spiritual/personal beliefs that influence decisions regarding their health  Family/Friends have spiritual/personal beliefs that influence decisions regarding the patient's health    Community:  Patient feels well-supported. Support system includes: Spouse/Partner and Friends  Family/Friends feel well-supported. Support system includes: Spouse/Partner and Friends    Assessment and Plan of Care:     Patient Interventions include: Facilitated expression of thoughts and feelings  Family/Friends Interventions include: Explored spiritual coping/struggle/distress and theological reflection and Affirmed coping skills/support systems    Patient Plan of Care: Spiritual Care available upon further referral  Family/Friends Plan of Care: No future visits per patient/family request    Electronically signed by RANGER

## 2024-09-06 NOTE — H&P
[facility] Urology  Omega Lawson MD [facility]    History and Physical    Patient:  Komal Crawford  MRN: [id number]  YOB: 1940    CHIEF COMPLAINT:  incomplete bladder emptying     HISTORY OF PRESENT ILLNESS:   The patient is a 84 y.o. female who presents with incomplete bladder emptying and inability to perform intermittent straight catheterization.  She wants to avoid a chronic urethral indwelling reeves catheter.  She is here today for a Cystoscopy and Suprapubic (SP) catheter insertion under MAC or GA.    Patient's old records, notes and chart reviewed and summarized above.    Past Medical History:    Past Medical History:   Diagnosis Date    Acquired absence of both cervix and uterus     Acquired absence of other organs     Age-related cognitive decline     Age-related osteoporosis without current pathological fracture     Ankle pain     Left ankle fracture in ortho boat.    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    Body mass index 31.0-31.9, adult     Caffeine use     2 coffee/day, 1-2 tea per week    Cataract extraction status of eye, left     Cataract extraction status of right eye     Cellulitis of left lower limb     Cerebral artery occlusion with cerebral infarction (HCC)     Chronic back pain     Chronic ITP (idiopathic thrombocytopenia) (HCC)     Constipation, unspecified     CVA (cerebral infarction) 2008, 2010    balance issues, short term memory loss    Cyst of kidney, acquired     Deficiency of other specified B group vitamins     Diabetic gastroparesis (HCC)     Diaphragmatic hernia without obstruction or gangrene     Diverticular disease 1986    Diverticulosis of intestine without perforation or abscess without bleeding     Dorsalgia, unspecified     Essential tremor      joint    Closed displaced fracture of right femoral neck (HCC)    Displaced supracondylar fracture without intracondylar extension of lower end of left femur, initial encounter for closed fracture (HCC)    Acute blood loss anemia    Periprosthetic fracture around internal prosthetic left knee joint    Closed fracture of left hip with routine healing    Rectal prolapse    Sepsis (HCC)    Severe sepsis (HCC)    Cellulitis of left lower extremity    Infection of total knee replacement (HCC)    Acute cystitis without hematuria    Open wound of left knee    Allergy to multiple antibiotics    Infection of total left knee replacement, sequela    Somnolence    Neurogenic bladder    Longstanding persistent atrial fibrillation (HCC)    Parkinson's disease    Closed fracture of right fibula and tibia       Plan: Cystoscopy and Suprapubic (SP) catheter insertion under MAC or GA.    Omega Lawson MD  8:49 AM 9/6/2024

## 2024-09-07 LAB
MICROORGANISM SPEC CULT: NORMAL
SERVICE CMNT-IMP: NORMAL
SPECIMEN DESCRIPTION: NORMAL

## 2024-09-08 DIAGNOSIS — Z86.73 HISTORY OF STROKE: ICD-10-CM

## 2024-09-09 ENCOUNTER — HOSPITAL ENCOUNTER (OUTPATIENT)
Dept: PHYSICAL THERAPY | Age: 84
Setting detail: THERAPIES SERIES
Discharge: HOME | End: 2024-09-09
Payer: MEDICARE

## 2024-09-09 ENCOUNTER — OFFICE VISIT (OUTPATIENT)
Dept: ORTHOPEDIC SURGERY | Age: 84
End: 2024-09-09
Payer: MEDICARE

## 2024-09-09 VITALS — BODY MASS INDEX: 32.36 KG/M2 | RESPIRATION RATE: 18 BRPM | WEIGHT: 150 LBS | HEIGHT: 57 IN

## 2024-09-09 DIAGNOSIS — M97.12XD PERIPROSTHETIC FRACTURE AROUND INTERNAL PROSTHETIC LEFT KNEE JOINT, SUBSEQUENT ENCOUNTER: Primary | ICD-10-CM

## 2024-09-09 DIAGNOSIS — Z96.652 HISTORY OF TOTAL KNEE ARTHROPLASTY, LEFT: ICD-10-CM

## 2024-09-09 PROCEDURE — 97110 THERAPEUTIC EXERCISES: CPT

## 2024-09-09 PROCEDURE — G8417 CALC BMI ABV UP PARAM F/U: HCPCS | Performed by: PHYSICIAN ASSISTANT

## 2024-09-09 PROCEDURE — 1036F TOBACCO NON-USER: CPT | Performed by: PHYSICIAN ASSISTANT

## 2024-09-09 PROCEDURE — 99213 OFFICE O/P EST LOW 20 MIN: CPT | Performed by: PHYSICIAN ASSISTANT

## 2024-09-09 PROCEDURE — 1090F PRES/ABSN URINE INCON ASSESS: CPT | Performed by: PHYSICIAN ASSISTANT

## 2024-09-09 PROCEDURE — 1123F ACP DISCUSS/DSCN MKR DOCD: CPT | Performed by: PHYSICIAN ASSISTANT

## 2024-09-09 PROCEDURE — G8427 DOCREV CUR MEDS BY ELIG CLIN: HCPCS | Performed by: PHYSICIAN ASSISTANT

## 2024-09-09 PROCEDURE — G8400 PT W/DXA NO RESULTS DOC: HCPCS | Performed by: PHYSICIAN ASSISTANT

## 2024-09-09 RX ORDER — ATORVASTATIN CALCIUM 10 MG/1
10 TABLET, FILM COATED ORAL DAILY
Qty: 30 TABLET | Refills: 2 | Status: SHIPPED | OUTPATIENT
Start: 2024-09-09

## 2024-09-10 ENCOUNTER — APPOINTMENT (OUTPATIENT)
Dept: GENERAL RADIOLOGY | Age: 84
End: 2024-09-10
Payer: MEDICARE

## 2024-09-10 ENCOUNTER — HOSPITAL ENCOUNTER (EMERGENCY)
Age: 84
Discharge: HOME | End: 2024-09-10
Attending: EMERGENCY MEDICINE
Payer: MEDICARE

## 2024-09-10 ENCOUNTER — APPOINTMENT (OUTPATIENT)
Dept: CT IMAGING | Age: 84
End: 2024-09-10
Payer: MEDICARE

## 2024-09-10 VITALS
RESPIRATION RATE: 16 BRPM | WEIGHT: 150 LBS | HEIGHT: 61 IN | HEART RATE: 63 BPM | TEMPERATURE: 98.3 F | BODY MASS INDEX: 28.32 KG/M2 | OXYGEN SATURATION: 97 % | SYSTOLIC BLOOD PRESSURE: 171 MMHG | DIASTOLIC BLOOD PRESSURE: 92 MMHG

## 2024-09-10 DIAGNOSIS — N30.00 ACUTE CYSTITIS WITHOUT HEMATURIA: ICD-10-CM

## 2024-09-10 DIAGNOSIS — R11.2 NAUSEA AND VOMITING, UNSPECIFIED VOMITING TYPE: Primary | ICD-10-CM

## 2024-09-10 LAB
ALBUMIN SERPL-MCNC: 3.2 G/DL (ref 3.5–5.2)
ALP SERPL-CCNC: 87 U/L (ref 35–104)
ALT SERPL-CCNC: 11 U/L (ref 5–33)
ANION GAP SERPL CALCULATED.3IONS-SCNC: 12 MMOL/L (ref 9–17)
AST SERPL-CCNC: 11 U/L
BACTERIA URNS QL MICRO: ABNORMAL
BASOPHILS # BLD: 0.1 K/UL (ref 0–0.2)
BASOPHILS NFR BLD: 1 % (ref 0–2)
BILIRUB DIRECT SERPL-MCNC: 0.1 MG/DL
BILIRUB INDIRECT SERPL-MCNC: 0.2 MG/DL (ref 0–1)
BILIRUB SERPL-MCNC: 0.3 MG/DL (ref 0.3–1.2)
BILIRUB UR QL STRIP: NEGATIVE
BUN SERPL-MCNC: 16 MG/DL (ref 8–23)
CALCIUM SERPL-MCNC: 8.9 MG/DL (ref 8.6–10.4)
CASTS #/AREA URNS LPF: ABNORMAL /LPF
CHLORIDE SERPL-SCNC: 104 MMOL/L (ref 98–107)
CLARITY UR: CLEAR
CO2 SERPL-SCNC: 24 MMOL/L (ref 20–31)
COLOR UR: YELLOW
CREAT SERPL-MCNC: 0.6 MG/DL (ref 0.5–0.9)
EOSINOPHIL # BLD: 0.1 K/UL (ref 0–0.4)
EOSINOPHILS RELATIVE PERCENT: 1 % (ref 0–4)
EPI CELLS #/AREA URNS HPF: ABNORMAL /HPF
ERYTHROCYTE [DISTWIDTH] IN BLOOD BY AUTOMATED COUNT: 16.3 % (ref 11.5–14.9)
FLUAV RNA RESP QL NAA+PROBE: NOT DETECTED
FLUBV RNA RESP QL NAA+PROBE: NOT DETECTED
GFR, ESTIMATED: 89 ML/MIN/1.73M2
GLUCOSE SERPL-MCNC: 159 MG/DL (ref 70–99)
GLUCOSE UR STRIP-MCNC: NEGATIVE MG/DL
HCT VFR BLD AUTO: 32.9 % (ref 36–46)
HGB BLD-MCNC: 10.7 G/DL (ref 12–16)
HGB UR QL STRIP.AUTO: ABNORMAL
INR PPP: 1.1
KETONES UR STRIP-MCNC: NEGATIVE MG/DL
LEUKOCYTE ESTERASE UR QL STRIP: ABNORMAL
LIPASE SERPL-CCNC: 19 U/L (ref 13–60)
LYMPHOCYTES NFR BLD: 1.5 K/UL (ref 1–4.8)
LYMPHOCYTES RELATIVE PERCENT: 16 % (ref 24–44)
MAGNESIUM SERPL-MCNC: 1.8 MG/DL (ref 1.6–2.6)
MCH RBC QN AUTO: 28.5 PG (ref 26–34)
MCHC RBC AUTO-ENTMCNC: 32.4 G/DL (ref 31–37)
MCV RBC AUTO: 87.9 FL (ref 80–100)
MONOCYTES NFR BLD: 0.7 K/UL (ref 0.1–1.3)
MONOCYTES NFR BLD: 8 % (ref 1–7)
NEUTROPHILS NFR BLD: 74 % (ref 36–66)
NEUTS SEG NFR BLD: 7 K/UL (ref 1.3–9.1)
NITRITE UR QL STRIP: NEGATIVE
PARTIAL THROMBOPLASTIN TIME: 27.2 SEC (ref 24–36)
PH UR STRIP: 7 [PH] (ref 5–8)
PHOSPHATE SERPL-MCNC: 3.2 MG/DL (ref 2.6–4.5)
PLATELET # BLD AUTO: 139 K/UL (ref 150–450)
PMV BLD AUTO: 11.8 FL (ref 6–12)
POTASSIUM SERPL-SCNC: 3.7 MMOL/L (ref 3.7–5.3)
PROT SERPL-MCNC: 6.4 G/DL (ref 6.4–8.3)
PROT UR STRIP-MCNC: ABNORMAL MG/DL
PROTHROMBIN TIME: 14.3 SEC (ref 11.8–14.6)
RBC # BLD AUTO: 3.74 M/UL (ref 4–5.2)
RBC #/AREA URNS HPF: ABNORMAL /HPF
SARS-COV-2 RNA RESP QL NAA+PROBE: NOT DETECTED
SODIUM SERPL-SCNC: 140 MMOL/L (ref 135–144)
SOURCE: NORMAL
SP GR UR STRIP: 1.01 (ref 1–1.03)
SPECIMEN DESCRIPTION: NORMAL
TROPONIN I SERPL HS-MCNC: 12 NG/L (ref 0–14)
TROPONIN I SERPL HS-MCNC: 13 NG/L (ref 0–14)
UROBILINOGEN UR STRIP-ACNC: NORMAL EU/DL (ref 0–1)
WBC #/AREA URNS HPF: ABNORMAL /HPF
WBC OTHER # BLD: 9.4 K/UL (ref 3.5–11)
YEAST URNS QL MICRO: ABNORMAL

## 2024-09-10 PROCEDURE — 74176 CT ABD & PELVIS W/O CONTRAST: CPT

## 2024-09-10 PROCEDURE — 80048 BASIC METABOLIC PNL TOTAL CA: CPT

## 2024-09-10 PROCEDURE — 96374 THER/PROPH/DIAG INJ IV PUSH: CPT

## 2024-09-10 PROCEDURE — 93005 ELECTROCARDIOGRAM TRACING: CPT | Performed by: EMERGENCY MEDICINE

## 2024-09-10 PROCEDURE — 80076 HEPATIC FUNCTION PANEL: CPT

## 2024-09-10 PROCEDURE — 85610 PROTHROMBIN TIME: CPT

## 2024-09-10 PROCEDURE — 87636 SARSCOV2 & INF A&B AMP PRB: CPT

## 2024-09-10 PROCEDURE — 85730 THROMBOPLASTIN TIME PARTIAL: CPT

## 2024-09-10 PROCEDURE — 83690 ASSAY OF LIPASE: CPT

## 2024-09-10 PROCEDURE — 6360000002 HC RX W HCPCS: Performed by: EMERGENCY MEDICINE

## 2024-09-10 PROCEDURE — 85025 COMPLETE CBC W/AUTO DIFF WBC: CPT

## 2024-09-10 PROCEDURE — 87086 URINE CULTURE/COLONY COUNT: CPT

## 2024-09-10 PROCEDURE — 81001 URINALYSIS AUTO W/SCOPE: CPT

## 2024-09-10 PROCEDURE — 2580000003 HC RX 258: Performed by: EMERGENCY MEDICINE

## 2024-09-10 PROCEDURE — 83735 ASSAY OF MAGNESIUM: CPT

## 2024-09-10 PROCEDURE — 36415 COLL VENOUS BLD VENIPUNCTURE: CPT

## 2024-09-10 PROCEDURE — 84484 ASSAY OF TROPONIN QUANT: CPT

## 2024-09-10 PROCEDURE — 99285 EMERGENCY DEPT VISIT HI MDM: CPT

## 2024-09-10 PROCEDURE — 84100 ASSAY OF PHOSPHORUS: CPT

## 2024-09-10 PROCEDURE — 71045 X-RAY EXAM CHEST 1 VIEW: CPT

## 2024-09-10 RX ORDER — ONDANSETRON 2 MG/ML
4 INJECTION INTRAMUSCULAR; INTRAVENOUS ONCE
Status: COMPLETED | OUTPATIENT
Start: 2024-09-10 | End: 2024-09-10

## 2024-09-10 RX ORDER — ONDANSETRON 4 MG/1
4 TABLET, ORALLY DISINTEGRATING ORAL 3 TIMES DAILY PRN
Qty: 21 TABLET | Refills: 0 | Status: SHIPPED | OUTPATIENT
Start: 2024-09-10

## 2024-09-10 RX ORDER — 0.9 % SODIUM CHLORIDE 0.9 %
1000 INTRAVENOUS SOLUTION INTRAVENOUS ONCE
Status: COMPLETED | OUTPATIENT
Start: 2024-09-10 | End: 2024-09-10

## 2024-09-10 RX ADMIN — SODIUM CHLORIDE 1000 ML: 9 INJECTION, SOLUTION INTRAVENOUS at 11:56

## 2024-09-10 RX ADMIN — ONDANSETRON 4 MG: 2 INJECTION INTRAMUSCULAR; INTRAVENOUS at 11:56

## 2024-09-10 ASSESSMENT — ENCOUNTER SYMPTOMS
FACIAL SWELLING: 0
EYE PAIN: 0
SHORTNESS OF BREATH: 0
BACK PAIN: 0
VOMITING: 1
ABDOMINAL PAIN: 0
VOICE CHANGE: 0
CHEST TIGHTNESS: 0
NAUSEA: 1
TROUBLE SWALLOWING: 0
COLOR CHANGE: 0
PHOTOPHOBIA: 0

## 2024-09-10 ASSESSMENT — PAIN - FUNCTIONAL ASSESSMENT: PAIN_FUNCTIONAL_ASSESSMENT: NONE - DENIES PAIN

## 2024-09-10 ASSESSMENT — HEART SCORE: ECG: NORMAL

## 2024-09-11 ENCOUNTER — HOSPITAL ENCOUNTER (OUTPATIENT)
Dept: PHYSICAL THERAPY | Age: 84
Setting detail: THERAPIES SERIES
Discharge: HOME | End: 2024-09-11
Payer: MEDICARE

## 2024-09-11 ENCOUNTER — TELEPHONE (OUTPATIENT)
Age: 84
End: 2024-09-11

## 2024-09-11 ENCOUNTER — CARE COORDINATION (OUTPATIENT)
Dept: CARE COORDINATION | Age: 84
End: 2024-09-11

## 2024-09-11 LAB
EKG ATRIAL RATE: 62 BPM
EKG P AXIS: 10 DEGREES
EKG P-R INTERVAL: 130 MS
EKG Q-T INTERVAL: 426 MS
EKG QRS DURATION: 86 MS
EKG QTC CALCULATION (BAZETT): 432 MS
EKG R AXIS: -27 DEGREES
EKG T AXIS: 6 DEGREES
EKG VENTRICULAR RATE: 62 BPM
MICROORGANISM SPEC CULT: ABNORMAL
SPECIMEN DESCRIPTION: ABNORMAL

## 2024-09-11 PROCEDURE — 93010 ELECTROCARDIOGRAM REPORT: CPT | Performed by: INTERNAL MEDICINE

## 2024-09-16 ENCOUNTER — TELEPHONE (OUTPATIENT)
Age: 84
End: 2024-09-16

## 2024-09-16 ENCOUNTER — HOSPITAL ENCOUNTER (OUTPATIENT)
Dept: PHYSICAL THERAPY | Age: 84
Setting detail: THERAPIES SERIES
Discharge: HOME | End: 2024-09-16
Payer: MEDICARE

## 2024-09-16 PROCEDURE — 97110 THERAPEUTIC EXERCISES: CPT

## 2024-09-18 ENCOUNTER — OFFICE VISIT (OUTPATIENT)
Age: 84
End: 2024-09-18
Payer: MEDICARE

## 2024-09-18 VITALS — BODY MASS INDEX: 28.32 KG/M2 | HEIGHT: 61 IN | WEIGHT: 150 LBS

## 2024-09-18 DIAGNOSIS — R33.9 URINARY RETENTION: ICD-10-CM

## 2024-09-18 DIAGNOSIS — N31.9 NEUROGENIC BLADDER: Primary | ICD-10-CM

## 2024-09-18 PROCEDURE — 1090F PRES/ABSN URINE INCON ASSESS: CPT | Performed by: SPECIALIST

## 2024-09-18 PROCEDURE — 99212 OFFICE O/P EST SF 10 MIN: CPT | Performed by: SPECIALIST

## 2024-09-18 PROCEDURE — 1123F ACP DISCUSS/DSCN MKR DOCD: CPT | Performed by: SPECIALIST

## 2024-09-18 PROCEDURE — 1036F TOBACCO NON-USER: CPT | Performed by: SPECIALIST

## 2024-09-18 PROCEDURE — G8427 DOCREV CUR MEDS BY ELIG CLIN: HCPCS | Performed by: SPECIALIST

## 2024-09-18 PROCEDURE — G8417 CALC BMI ABV UP PARAM F/U: HCPCS | Performed by: SPECIALIST

## 2024-09-18 PROCEDURE — G8400 PT W/DXA NO RESULTS DOC: HCPCS | Performed by: SPECIALIST

## 2024-09-19 ENCOUNTER — HOSPITAL ENCOUNTER (OUTPATIENT)
Dept: PHYSICAL THERAPY | Age: 84
Setting detail: THERAPIES SERIES
Discharge: HOME | End: 2024-09-19
Payer: MEDICARE

## 2024-09-19 DIAGNOSIS — Z86.73 HISTORY OF STROKE: ICD-10-CM

## 2024-09-19 DIAGNOSIS — G25.81 RESTLESS LEGS SYNDROME: ICD-10-CM

## 2024-09-19 DIAGNOSIS — K59.00 CONSTIPATION, UNSPECIFIED CONSTIPATION TYPE: ICD-10-CM

## 2024-09-19 PROCEDURE — 97110 THERAPEUTIC EXERCISES: CPT

## 2024-09-19 PROCEDURE — 97016 VASOPNEUMATIC DEVICE THERAPY: CPT

## 2024-09-20 ENCOUNTER — HOSPITAL ENCOUNTER (OUTPATIENT)
Dept: WOUND CARE | Age: 84
Discharge: HOME | End: 2024-09-20
Payer: MEDICARE

## 2024-09-20 VITALS
DIASTOLIC BLOOD PRESSURE: 68 MMHG | HEART RATE: 69 BPM | BODY MASS INDEX: 28.32 KG/M2 | HEIGHT: 61 IN | TEMPERATURE: 97.7 F | SYSTOLIC BLOOD PRESSURE: 144 MMHG | RESPIRATION RATE: 18 BRPM | WEIGHT: 150 LBS

## 2024-09-20 DIAGNOSIS — T84.54XS INFECTION OF TOTAL LEFT KNEE REPLACEMENT, SEQUELA: ICD-10-CM

## 2024-09-20 DIAGNOSIS — S81.002A OPEN WOUND OF LEFT KNEE, INITIAL ENCOUNTER: Primary | ICD-10-CM

## 2024-09-20 DIAGNOSIS — M86.9 OSTEOMYELITIS, UNSPECIFIED SITE, UNSPECIFIED TYPE (HCC): ICD-10-CM

## 2024-09-20 PROCEDURE — 99214 OFFICE O/P EST MOD 30 MIN: CPT | Performed by: INTERNAL MEDICINE

## 2024-09-20 PROCEDURE — 99212 OFFICE O/P EST SF 10 MIN: CPT

## 2024-09-20 RX ORDER — SILVER SULFADIAZINE 10 MG/G
CREAM TOPICAL ONCE
OUTPATIENT
Start: 2024-09-20 | End: 2024-09-20

## 2024-09-20 RX ORDER — GENTAMICIN SULFATE 1 MG/G
OINTMENT TOPICAL ONCE
OUTPATIENT
Start: 2024-09-20 | End: 2024-09-20

## 2024-09-20 RX ORDER — LIDOCAINE 50 MG/G
OINTMENT TOPICAL ONCE
OUTPATIENT
Start: 2024-09-20 | End: 2024-09-20

## 2024-09-20 RX ORDER — CLOPIDOGREL BISULFATE 75 MG/1
75 TABLET ORAL DAILY
Qty: 30 TABLET | Refills: 2 | Status: SHIPPED | OUTPATIENT
Start: 2024-09-20

## 2024-09-20 RX ORDER — DOXYCYCLINE HYCLATE 100 MG
100 TABLET ORAL 2 TIMES DAILY
Qty: 180 TABLET | Refills: 0 | Status: SHIPPED | OUTPATIENT
Start: 2024-09-20 | End: 2024-12-19

## 2024-09-20 RX ORDER — TRIAMCINOLONE ACETONIDE 1 MG/G
OINTMENT TOPICAL ONCE
OUTPATIENT
Start: 2024-09-20 | End: 2024-09-20

## 2024-09-20 RX ORDER — LIDOCAINE HYDROCHLORIDE 20 MG/ML
JELLY TOPICAL ONCE
OUTPATIENT
Start: 2024-09-20 | End: 2024-09-20

## 2024-09-20 RX ORDER — BETAMETHASONE DIPROPIONATE 0.5 MG/G
CREAM TOPICAL ONCE
OUTPATIENT
Start: 2024-09-20 | End: 2024-09-20

## 2024-09-20 RX ORDER — ROPINIROLE 4 MG/1
TABLET, FILM COATED ORAL
Qty: 30 TABLET | Refills: 2 | Status: SHIPPED | OUTPATIENT
Start: 2024-09-20

## 2024-09-20 RX ORDER — NEOMYCIN/BACITRACIN/POLYMYXINB 3.5-400-5K
OINTMENT (GRAM) TOPICAL ONCE
OUTPATIENT
Start: 2024-09-20 | End: 2024-09-20

## 2024-09-20 RX ORDER — MUPIROCIN 20 MG/G
OINTMENT TOPICAL ONCE
OUTPATIENT
Start: 2024-09-20 | End: 2024-09-20

## 2024-09-20 RX ORDER — CLOBETASOL PROPIONATE 0.5 MG/G
OINTMENT TOPICAL ONCE
OUTPATIENT
Start: 2024-09-20 | End: 2024-09-20

## 2024-09-20 RX ORDER — SODIUM CHLOR/HYPOCHLOROUS ACID 0.033 %
SOLUTION, IRRIGATION IRRIGATION ONCE
OUTPATIENT
Start: 2024-09-20 | End: 2024-09-20

## 2024-09-20 RX ORDER — LIDOCAINE 40 MG/G
CREAM TOPICAL ONCE
OUTPATIENT
Start: 2024-09-20 | End: 2024-09-20

## 2024-09-20 RX ORDER — GINSENG 100 MG
CAPSULE ORAL ONCE
OUTPATIENT
Start: 2024-09-20 | End: 2024-09-20

## 2024-09-20 RX ORDER — BACITRACIN ZINC AND POLYMYXIN B SULFATE 500; 1000 [USP'U]/G; [USP'U]/G
OINTMENT TOPICAL ONCE
OUTPATIENT
Start: 2024-09-20 | End: 2024-09-20

## 2024-09-20 RX ORDER — BISACODYL 5 MG
TABLET, DELAYED RELEASE (ENTERIC COATED) ORAL
Qty: 60 TABLET | Refills: 2 | Status: SHIPPED | OUTPATIENT
Start: 2024-09-20

## 2024-09-20 RX ORDER — LIDOCAINE HYDROCHLORIDE 40 MG/ML
SOLUTION TOPICAL ONCE
OUTPATIENT
Start: 2024-09-20 | End: 2024-09-20

## 2024-09-20 ASSESSMENT — PAIN SCALES - GENERAL: PAINLEVEL_OUTOF10: 0

## 2024-09-23 ENCOUNTER — OFFICE VISIT (OUTPATIENT)
Age: 84
End: 2024-09-23
Payer: MEDICARE

## 2024-09-23 ENCOUNTER — OFFICE VISIT (OUTPATIENT)
Dept: PRIMARY CARE CLINIC | Age: 84
End: 2024-09-23
Payer: MEDICARE

## 2024-09-23 VITALS — HEIGHT: 61 IN | BODY MASS INDEX: 28.32 KG/M2 | WEIGHT: 150 LBS

## 2024-09-23 VITALS
BODY MASS INDEX: 28.34 KG/M2 | SYSTOLIC BLOOD PRESSURE: 140 MMHG | HEIGHT: 61 IN | DIASTOLIC BLOOD PRESSURE: 82 MMHG | HEART RATE: 65 BPM | OXYGEN SATURATION: 98 %

## 2024-09-23 DIAGNOSIS — Z93.59 SUPRAPUBIC CATHETER (HCC): Primary | ICD-10-CM

## 2024-09-23 DIAGNOSIS — R33.9 URINARY RETENTION: ICD-10-CM

## 2024-09-23 DIAGNOSIS — R11.11 VOMITING WITHOUT NAUSEA, UNSPECIFIED VOMITING TYPE: ICD-10-CM

## 2024-09-23 DIAGNOSIS — E11.51 TYPE 2 DIABETES MELLITUS WITH DIABETIC PERIPHERAL ANGIOPATHY WITHOUT GANGRENE, WITHOUT LONG-TERM CURRENT USE OF INSULIN (HCC): ICD-10-CM

## 2024-09-23 DIAGNOSIS — N31.9 NEUROGENIC BLADDER: Primary | ICD-10-CM

## 2024-09-23 LAB
BASOPHILS ABSOLUTE: 0.11 K/UL (ref 0–0.2)
BASOPHILS RELATIVE PERCENT: 2 % (ref 0–2)
CHP ED QC CHECK: NORMAL
EOSINOPHILS ABSOLUTE: 0.19 K/UL (ref 0–0.44)
EOSINOPHILS RELATIVE PERCENT: 3 % (ref 1–4)
GLUCOSE BLD-MCNC: 141 MG/DL
HCT VFR BLD CALC: 35.8 % (ref 36.3–47.1)
HEMOGLOBIN: 10.9 G/DL (ref 11.9–15.1)
IMMATURE GRANULOCYTES %: 0 %
IMMATURE GRANULOCYTES ABSOLUTE: 0.03 K/UL (ref 0–0.3)
LYMPHOCYTES ABSOLUTE: 1.56 K/UL (ref 1.1–3.7)
LYMPHOCYTES RELATIVE PERCENT: 21 % (ref 24–43)
MCH RBC QN AUTO: 27.3 PG (ref 25.2–33.5)
MCHC RBC AUTO-ENTMCNC: 30.4 G/DL (ref 28.4–34.8)
MCV RBC AUTO: 89.7 FL (ref 82.6–102.9)
MONOCYTES ABSOLUTE: 0.6 K/UL (ref 0.1–1.2)
MONOCYTES RELATIVE PERCENT: 8 % (ref 3–12)
NEUTROPHILS ABSOLUTE: 4.84 K/UL (ref 1.5–8.1)
NEUTROPHILS RELATIVE PERCENT: 66 % (ref 36–65)
NRBC AUTOMATED: 0 PER 100 WBC
PDW BLD-RTO: 15.9 % (ref 11.8–14.4)
PLATELET # BLD: ABNORMAL K/UL (ref 138–453)
PLATELET, FLUORESCENCE: 185 K/UL (ref 138–453)
PLATELET, IMMATURE FRACTION: 18.2 % (ref 1.1–10.3)
PROCALCITONIN: 0.03 NG/ML (ref 0–0.09)
RBC # BLD: 3.99 M/UL (ref 3.95–5.11)
RBC # BLD: ABNORMAL 10*6/UL
WBC # BLD: 7.3 K/UL (ref 3.5–11.3)

## 2024-09-23 PROCEDURE — G8417 CALC BMI ABV UP PARAM F/U: HCPCS | Performed by: STUDENT IN AN ORGANIZED HEALTH CARE EDUCATION/TRAINING PROGRAM

## 2024-09-23 PROCEDURE — 51700 IRRIGATION OF BLADDER: CPT | Performed by: SPECIALIST

## 2024-09-23 PROCEDURE — 1036F TOBACCO NON-USER: CPT | Performed by: STUDENT IN AN ORGANIZED HEALTH CARE EDUCATION/TRAINING PROGRAM

## 2024-09-23 PROCEDURE — G8417 CALC BMI ABV UP PARAM F/U: HCPCS | Performed by: SPECIALIST

## 2024-09-23 PROCEDURE — G8427 DOCREV CUR MEDS BY ELIG CLIN: HCPCS | Performed by: STUDENT IN AN ORGANIZED HEALTH CARE EDUCATION/TRAINING PROGRAM

## 2024-09-23 PROCEDURE — G8400 PT W/DXA NO RESULTS DOC: HCPCS | Performed by: STUDENT IN AN ORGANIZED HEALTH CARE EDUCATION/TRAINING PROGRAM

## 2024-09-23 PROCEDURE — G8400 PT W/DXA NO RESULTS DOC: HCPCS | Performed by: SPECIALIST

## 2024-09-23 PROCEDURE — 3079F DIAST BP 80-89 MM HG: CPT | Performed by: STUDENT IN AN ORGANIZED HEALTH CARE EDUCATION/TRAINING PROGRAM

## 2024-09-23 PROCEDURE — 1090F PRES/ABSN URINE INCON ASSESS: CPT | Performed by: STUDENT IN AN ORGANIZED HEALTH CARE EDUCATION/TRAINING PROGRAM

## 2024-09-23 PROCEDURE — 3044F HG A1C LEVEL LT 7.0%: CPT | Performed by: STUDENT IN AN ORGANIZED HEALTH CARE EDUCATION/TRAINING PROGRAM

## 2024-09-23 PROCEDURE — 1123F ACP DISCUSS/DSCN MKR DOCD: CPT | Performed by: STUDENT IN AN ORGANIZED HEALTH CARE EDUCATION/TRAINING PROGRAM

## 2024-09-23 PROCEDURE — 1090F PRES/ABSN URINE INCON ASSESS: CPT | Performed by: SPECIALIST

## 2024-09-23 PROCEDURE — 82962 GLUCOSE BLOOD TEST: CPT | Performed by: STUDENT IN AN ORGANIZED HEALTH CARE EDUCATION/TRAINING PROGRAM

## 2024-09-23 PROCEDURE — 1123F ACP DISCUSS/DSCN MKR DOCD: CPT | Performed by: SPECIALIST

## 2024-09-23 PROCEDURE — G8427 DOCREV CUR MEDS BY ELIG CLIN: HCPCS | Performed by: SPECIALIST

## 2024-09-23 PROCEDURE — 3077F SYST BP >= 140 MM HG: CPT | Performed by: STUDENT IN AN ORGANIZED HEALTH CARE EDUCATION/TRAINING PROGRAM

## 2024-09-23 PROCEDURE — 99214 OFFICE O/P EST MOD 30 MIN: CPT | Performed by: STUDENT IN AN ORGANIZED HEALTH CARE EDUCATION/TRAINING PROGRAM

## 2024-09-23 PROCEDURE — 99212 OFFICE O/P EST SF 10 MIN: CPT | Performed by: SPECIALIST

## 2024-09-23 PROCEDURE — 1036F TOBACCO NON-USER: CPT | Performed by: SPECIALIST

## 2024-09-23 RX ORDER — FLUCONAZOLE 200 MG/1
TABLET ORAL
Status: ON HOLD | COMMUNITY
Start: 2024-09-16

## 2024-09-23 RX ORDER — NITROFURANTOIN 25; 75 MG/1; MG/1
100 CAPSULE ORAL DAILY
Qty: 30 CAPSULE | Refills: 0 | Status: ON HOLD | OUTPATIENT
Start: 2024-09-23

## 2024-09-23 ASSESSMENT — ENCOUNTER SYMPTOMS
ABDOMINAL PAIN: 1
VOMITING: 1
SHORTNESS OF BREATH: 1

## 2024-09-26 ENCOUNTER — CARE COORDINATION (OUTPATIENT)
Dept: CARE COORDINATION | Age: 84
End: 2024-09-26

## 2024-09-26 RX ORDER — LANCING DEVICE
EACH MISCELLANEOUS
Qty: 100 EACH | Refills: 0 | OUTPATIENT
Start: 2024-09-26

## 2024-09-27 ENCOUNTER — HOSPITAL ENCOUNTER (OUTPATIENT)
Dept: PHYSICAL THERAPY | Age: 84
Setting detail: THERAPIES SERIES
Discharge: HOME | End: 2024-09-27
Payer: MEDICARE

## 2024-09-27 ENCOUNTER — APPOINTMENT (OUTPATIENT)
Dept: CT IMAGING | Age: 84
End: 2024-09-27
Payer: MEDICARE

## 2024-09-27 ENCOUNTER — HOSPITAL ENCOUNTER (INPATIENT)
Age: 84
LOS: 3 days | Discharge: HOME | End: 2024-09-30
Attending: EMERGENCY MEDICINE | Admitting: INTERNAL MEDICINE
Payer: MEDICARE

## 2024-09-27 DIAGNOSIS — K52.89 STERCORAL COLITIS: Primary | ICD-10-CM

## 2024-09-27 DIAGNOSIS — N30.01 ACUTE CYSTITIS WITH HEMATURIA: ICD-10-CM

## 2024-09-27 PROBLEM — N39.0 UTI (URINARY TRACT INFECTION): Status: ACTIVE | Noted: 2024-09-27

## 2024-09-27 LAB
ALBUMIN SERPL-MCNC: 3.4 G/DL (ref 3.5–5.2)
ALP SERPL-CCNC: 109 U/L (ref 35–104)
ALT SERPL-CCNC: 8 U/L (ref 5–33)
ANION GAP SERPL CALCULATED.3IONS-SCNC: 11 MMOL/L (ref 9–17)
AST SERPL-CCNC: 11 U/L
BASOPHILS # BLD: 0 K/UL (ref 0–0.2)
BASOPHILS NFR BLD: 0 % (ref 0–2)
BILIRUB SERPL-MCNC: 0.4 MG/DL (ref 0.3–1.2)
BILIRUB UR QL STRIP: ABNORMAL
BUN SERPL-MCNC: 23 MG/DL (ref 8–23)
CALCIUM SERPL-MCNC: 9.1 MG/DL (ref 8.6–10.4)
CASTS #/AREA URNS LPF: ABNORMAL /LPF
CHLORIDE SERPL-SCNC: 105 MMOL/L (ref 98–107)
CLARITY UR: ABNORMAL
CO2 SERPL-SCNC: 23 MMOL/L (ref 20–31)
COLOR UR: ABNORMAL
COMMENT: ABNORMAL
CREAT SERPL-MCNC: 0.7 MG/DL (ref 0.5–0.9)
EOSINOPHIL # BLD: 0.2 K/UL (ref 0–0.4)
EOSINOPHILS RELATIVE PERCENT: 2 % (ref 0–4)
EPI CELLS #/AREA URNS HPF: ABNORMAL /HPF
ERYTHROCYTE [DISTWIDTH] IN BLOOD BY AUTOMATED COUNT: 17 % (ref 11.5–14.9)
GFR, ESTIMATED: 86 ML/MIN/1.73M2
GLUCOSE SERPL-MCNC: 123 MG/DL (ref 70–99)
GLUCOSE UR STRIP-MCNC: NEGATIVE MG/DL
HCT VFR BLD AUTO: 34.2 % (ref 36–46)
HGB BLD-MCNC: 11.1 G/DL (ref 12–16)
HGB UR QL STRIP.AUTO: ABNORMAL
KETONES UR STRIP-MCNC: NEGATIVE MG/DL
LACTATE BLDV-SCNC: 1 MMOL/L (ref 0.5–2.2)
LEUKOCYTE ESTERASE UR QL STRIP: ABNORMAL
LYMPHOCYTES NFR BLD: 1.4 K/UL (ref 1–4.8)
LYMPHOCYTES RELATIVE PERCENT: 19 % (ref 24–44)
MCH RBC QN AUTO: 27.9 PG (ref 26–34)
MCHC RBC AUTO-ENTMCNC: 32.5 G/DL (ref 31–37)
MCV RBC AUTO: 86 FL (ref 80–100)
MONOCYTES NFR BLD: 0.5 K/UL (ref 0.1–1.3)
MONOCYTES NFR BLD: 8 % (ref 1–7)
NEUTROPHILS NFR BLD: 71 % (ref 36–66)
NEUTS SEG NFR BLD: 5.2 K/UL (ref 1.3–9.1)
NITRITE UR QL STRIP: POSITIVE
PH UR STRIP: 8.5 [PH] (ref 5–8)
PLATELET # BLD AUTO: 158 K/UL (ref 150–450)
PMV BLD AUTO: 12 FL (ref 6–12)
POTASSIUM SERPL-SCNC: 3.5 MMOL/L (ref 3.7–5.3)
PROT SERPL-MCNC: 6.8 G/DL (ref 6.4–8.3)
PROT UR STRIP-MCNC: ABNORMAL MG/DL
RBC # BLD AUTO: 3.98 M/UL (ref 4–5.2)
RBC #/AREA URNS HPF: ABNORMAL /HPF
SODIUM SERPL-SCNC: 139 MMOL/L (ref 135–144)
SP GR UR STRIP: 1.02 (ref 1–1.03)
UROBILINOGEN UR STRIP-ACNC: NORMAL EU/DL (ref 0–1)
WBC #/AREA URNS HPF: ABNORMAL /HPF
WBC OTHER # BLD: 7.3 K/UL (ref 3.5–11)
YEAST URNS QL MICRO: ABNORMAL

## 2024-09-27 PROCEDURE — 83605 ASSAY OF LACTIC ACID: CPT

## 2024-09-27 PROCEDURE — 6360000002 HC RX W HCPCS

## 2024-09-27 PROCEDURE — 6370000000 HC RX 637 (ALT 250 FOR IP)

## 2024-09-27 PROCEDURE — 87086 URINE CULTURE/COLONY COUNT: CPT

## 2024-09-27 PROCEDURE — 80053 COMPREHEN METABOLIC PANEL: CPT

## 2024-09-27 PROCEDURE — 96376 TX/PRO/DX INJ SAME DRUG ADON: CPT

## 2024-09-27 PROCEDURE — 6360000002 HC RX W HCPCS: Performed by: EMERGENCY MEDICINE

## 2024-09-27 PROCEDURE — 87077 CULTURE AEROBIC IDENTIFY: CPT

## 2024-09-27 PROCEDURE — 87186 SC STD MICRODIL/AGAR DIL: CPT

## 2024-09-27 PROCEDURE — 74176 CT ABD & PELVIS W/O CONTRAST: CPT

## 2024-09-27 PROCEDURE — 96375 TX/PRO/DX INJ NEW DRUG ADDON: CPT

## 2024-09-27 PROCEDURE — 1200000000 HC SEMI PRIVATE

## 2024-09-27 PROCEDURE — 96374 THER/PROPH/DIAG INJ IV PUSH: CPT

## 2024-09-27 PROCEDURE — 99285 EMERGENCY DEPT VISIT HI MDM: CPT

## 2024-09-27 PROCEDURE — 85025 COMPLETE CBC W/AUTO DIFF WBC: CPT

## 2024-09-27 PROCEDURE — 81001 URINALYSIS AUTO W/SCOPE: CPT

## 2024-09-27 PROCEDURE — 36415 COLL VENOUS BLD VENIPUNCTURE: CPT

## 2024-09-27 PROCEDURE — 2580000003 HC RX 258

## 2024-09-27 RX ORDER — FENTANYL CITRATE 0.05 MG/ML
25 INJECTION, SOLUTION INTRAMUSCULAR; INTRAVENOUS ONCE
Status: COMPLETED | OUTPATIENT
Start: 2024-09-27 | End: 2024-09-27

## 2024-09-27 RX ORDER — FUROSEMIDE 20 MG
20 TABLET ORAL DAILY
Qty: 30 TABLET | Refills: 3 | Status: ON HOLD | OUTPATIENT
Start: 2024-09-27

## 2024-09-27 RX ORDER — LEVOFLOXACIN 5 MG/ML
750 INJECTION, SOLUTION INTRAVENOUS EVERY 24 HOURS
Status: DISCONTINUED | OUTPATIENT
Start: 2024-09-27 | End: 2024-09-30

## 2024-09-27 RX ORDER — 0.9 % SODIUM CHLORIDE 0.9 %
500 INTRAVENOUS SOLUTION INTRAVENOUS ONCE
Status: COMPLETED | OUTPATIENT
Start: 2024-09-27 | End: 2024-09-27

## 2024-09-27 RX ORDER — ACETAMINOPHEN 500 MG
1000 TABLET ORAL ONCE
Status: COMPLETED | OUTPATIENT
Start: 2024-09-27 | End: 2024-09-27

## 2024-09-27 RX ADMIN — ACETAMINOPHEN 1000 MG: 500 TABLET ORAL at 18:24

## 2024-09-27 RX ADMIN — FENTANYL CITRATE 25 MCG: 0.05 INJECTION, SOLUTION INTRAMUSCULAR; INTRAVENOUS at 20:46

## 2024-09-27 RX ADMIN — LEVOFLOXACIN 750 MG: 5 INJECTION, SOLUTION INTRAVENOUS at 23:09

## 2024-09-27 RX ADMIN — FENTANYL CITRATE 25 MCG: 0.05 INJECTION, SOLUTION INTRAMUSCULAR; INTRAVENOUS at 18:57

## 2024-09-27 RX ADMIN — POTASSIUM BICARBONATE 20 MEQ: 782 TABLET, EFFERVESCENT ORAL at 18:24

## 2024-09-27 RX ADMIN — SODIUM CHLORIDE 500 ML: 9 INJECTION, SOLUTION INTRAVENOUS at 17:27

## 2024-09-27 ASSESSMENT — PAIN DESCRIPTION - ORIENTATION
ORIENTATION: LOWER

## 2024-09-27 ASSESSMENT — PAIN SCALES - GENERAL
PAINLEVEL_OUTOF10: 10
PAINLEVEL_OUTOF10: 8
PAINLEVEL_OUTOF10: 8

## 2024-09-27 ASSESSMENT — PAIN DESCRIPTION - DESCRIPTORS
DESCRIPTORS: ACHING
DESCRIPTORS: SPASM

## 2024-09-27 ASSESSMENT — PAIN - FUNCTIONAL ASSESSMENT: PAIN_FUNCTIONAL_ASSESSMENT: 0-10

## 2024-09-27 ASSESSMENT — PAIN DESCRIPTION - LOCATION
LOCATION: ABDOMEN
LOCATION: ABDOMEN
LOCATION: ABDOMEN;RECTUM
LOCATION: ABDOMEN;RECTUM

## 2024-09-27 NOTE — ED PROVIDER NOTES
[facility] ED  Emergency Department Encounter  Emergency Medicine Resident     Pt Name:Komal Crawford  MRN: [id number]  Birthdate 1940  Date of evaluation: 9/27/24  PCP:  Sabiha Bedolla MD  Note Started: 5:03 PM EDT      CHIEF COMPLAINT       Chief Complaint   Patient presents with    Urinary Catheter     Pt had suprapubic catheter placed approx 2 weeks ago, today having hematuria, having abdominal pain, states not feeling well    Hematuria       HISTORY OF PRESENT ILLNESS  (Location/Symptom, Timing/Onset, Context/Setting, Quality, Duration, Modifying Factors, Severity.)      Komal Crawford is a 84 y.o. female with significant pertinent medical history of suprapubic catheter, multiple bladder surgeries, who presents with hematuria and abdominal pain.  Patient states she has a periumbilical abdominal pain, as well as spasming of the urethra and anus, with normal bowel and urinary movements, however has hematuria.  Patient states that she will not pee for some time, will stand up or move, and then will have hematuria afterwards.    Patient states that this has been going on for approximately 3 days.  Stated that she also had bilious vomiting approximately 2 days ago, continues to have abdominal pain.    Patient has appointment for replacement of her suprapubic catheter in approximately 1 week, however presents with continued abdominal pain and complications of hematuria.    She has no fever or chills, no current nausea or vomiting, no shortness of breath or chest pains, abdominal pain present, review of systems otherwise negative.    PAST MEDICAL / SURGICAL / SOCIAL / FAMILY HISTORY      has a past medical history of Acquired absence of both cervix and uterus, Acquired absence of other organs, Age-related cognitive decline, Age-related osteoporosis without current pathological fracture, Ankle pain, Anxiety, Atherosclerotic heart disease of native coronary artery without angina pectoris, B12 deficiency,  any further issues during disimpaction.  Recommending admission to medicine.  Agreeing with Levaquin and Flagyl IV.  No further instruction or orders. [WH]   2256 Rectal disimpaction performed.  Patient noting significant improvement in symptoms.  Significant mount of stool obtained from the rectal vault.  Will initiate antibiotics, initiate admission at this time for coverage of UTI with IV antibiotics as well as to require colitis with IV antibiotics. []   2314 Internal medicine has admitted the patient. []      ED Course User Index  [] Lyle Mullen MD       PROCEDURES:  None    CONSULTS:  IP CONSULT TO UROLOGY  IP CONSULT TO GENERAL SURGERY  IP CONSULT TO HOSPITALIST  PHARMACY TO DOSE VANCOMYCIN    CRITICAL CARE:  There was significant risk of life threatening deterioration of patient's condition requiring my direct management. Critical care time 0 minutes, excluding any documented procedures.    FINAL IMPRESSION      1. Stercoral colitis    2. Acute cystitis with hematuria          DISPOSITION / PLAN     DISPOSITION Admitted 09/27/2024 11:28:18 PM  Condition at Disposition: Data Unavailable      PATIENT REFERRED TO:  No follow-up provider specified.    DISCHARGE MEDICATIONS:  New Prescriptions    No medications on file       Lyle Mullen MD  Emergency Medicine Resident    (Please note that portions of thisnote were completed with a voice recognition program.  Efforts were made to edit the dictations but occasionally words are mis-transcribed.)

## 2024-09-27 NOTE — FLOWSHEET NOTE
[facility]   Outpatient Rehabilitation & Therapy  [address]  P: [phone]   F: [phone]     Physical Therapy Cancel/No Show note    Date: 2024  Patient: Komal Crawford  : 1940  MRN: [id number]    Visit Count:   Cancels/No Shows to date: 3/3    For today's appointment patient:    [x]  Cancelled    [] Rescheduled appointment    [] No-show     Reason given by patient:    [x]  Patient ill    []  Conflicting appointment    [] No transportation      [] Conflict with work    [] No reason given    [] Weather related    [] COVID-19    [] Other:      Comments:        [x] Next appointment was confirmed    Electronically signed by: YARELI LARA PTA

## 2024-09-27 NOTE — ED PROVIDER NOTES
EMERGENCY DEPARTMENT ENCOUNTER   ATTENDING ATTESTATION     Pt Name: Komal Crawford  MRN: [id number]  Birthdate 1940  Date of evaluation: 9/27/24       Komal Crawford is a 84 y.o. female who presents with Urinary Catheter (Pt had suprapubic catheter placed approx 2 weeks ago, today having hematuria, having abdominal pain, states not feeling well) and Hematuria  Suprapubic cathter placed 2 weeks. Today she began having lower abdominal pain, nausea, vomiting, having hematuria today, she has periods of anuria and then after moving around, the cathter will drain urine that looks like red wine. She also reports spasming of her urethra and rectum.     MDM:   UA with evidence of UTI.   CT with evidence of stercoral colitis.   Plan for levaquin and flagyl to cover UTI and colitis.   Discussed with Dr Gonzalez who recommends careful attempt at manual disimpaction.   Admit for bowel regimen, abx.     Vitals:   Vitals:    09/27/24 1638   BP: (!) 137/100   Pulse: 84   Resp: 16   Temp: 97.5 °F (36.4 °C)   SpO2: 100%   Weight: 68 kg (150 lb)   Height: 1.448 m (4' 9\")         I personally saw and examined the patient. I have reviewed and agree with the resident's findings, including all diagnostic interpretations and treatment plan as written. I was present for the key portions of any procedures performed and the inclusive time noted for any critical care statement.    An Ny MD  Attending Emergency Physician            An Ny MD  09/27/24 3703

## 2024-09-28 LAB
ANION GAP SERPL CALCULATED.3IONS-SCNC: 10 MMOL/L (ref 9–17)
BASOPHILS # BLD: 0.1 K/UL (ref 0–0.2)
BASOPHILS NFR BLD: 1 % (ref 0–2)
BUN SERPL-MCNC: 16 MG/DL (ref 8–23)
CALCIUM SERPL-MCNC: 8.5 MG/DL (ref 8.6–10.4)
CHLORIDE SERPL-SCNC: 102 MMOL/L (ref 98–107)
CO2 SERPL-SCNC: 23 MMOL/L (ref 20–31)
CREAT SERPL-MCNC: 0.8 MG/DL (ref 0.5–0.9)
EOSINOPHIL # BLD: 0.2 K/UL (ref 0–0.4)
EOSINOPHILS RELATIVE PERCENT: 3 % (ref 0–4)
ERYTHROCYTE [DISTWIDTH] IN BLOOD BY AUTOMATED COUNT: 17.1 % (ref 11.5–14.9)
GFR, ESTIMATED: 73 ML/MIN/1.73M2
GLUCOSE BLD-MCNC: 226 MG/DL (ref 65–105)
GLUCOSE SERPL-MCNC: 147 MG/DL (ref 70–99)
HCT VFR BLD AUTO: 35.1 % (ref 36–46)
HGB BLD-MCNC: 11.2 G/DL (ref 12–16)
LYMPHOCYTES NFR BLD: 1 K/UL (ref 1–4.8)
LYMPHOCYTES RELATIVE PERCENT: 15 % (ref 24–44)
MCH RBC QN AUTO: 28.3 PG (ref 26–34)
MCHC RBC AUTO-ENTMCNC: 31.8 G/DL (ref 31–37)
MCV RBC AUTO: 88.8 FL (ref 80–100)
MONOCYTES NFR BLD: 0.6 K/UL (ref 0.1–1.3)
MONOCYTES NFR BLD: 9 % (ref 1–7)
NEUTROPHILS NFR BLD: 72 % (ref 36–66)
NEUTS SEG NFR BLD: 5.1 K/UL (ref 1.3–9.1)
PLATELET # BLD AUTO: 141 K/UL (ref 150–450)
PMV BLD AUTO: 11.5 FL (ref 6–12)
POTASSIUM SERPL-SCNC: 3.9 MMOL/L (ref 3.7–5.3)
RBC # BLD AUTO: 3.96 M/UL (ref 4–5.2)
SODIUM SERPL-SCNC: 135 MMOL/L (ref 135–144)
WBC OTHER # BLD: 7 K/UL (ref 3.5–11)

## 2024-09-28 PROCEDURE — 6360000002 HC RX W HCPCS

## 2024-09-28 PROCEDURE — 36415 COLL VENOUS BLD VENIPUNCTURE: CPT

## 2024-09-28 PROCEDURE — 1200000000 HC SEMI PRIVATE

## 2024-09-28 PROCEDURE — 6370000000 HC RX 637 (ALT 250 FOR IP)

## 2024-09-28 PROCEDURE — 80048 BASIC METABOLIC PNL TOTAL CA: CPT

## 2024-09-28 PROCEDURE — 85025 COMPLETE CBC W/AUTO DIFF WBC: CPT

## 2024-09-28 PROCEDURE — 2580000003 HC RX 258

## 2024-09-28 PROCEDURE — 6370000000 HC RX 637 (ALT 250 FOR IP): Performed by: SURGERY

## 2024-09-28 PROCEDURE — 82947 ASSAY GLUCOSE BLOOD QUANT: CPT

## 2024-09-28 PROCEDURE — 6370000000 HC RX 637 (ALT 250 FOR IP): Performed by: INTERNAL MEDICINE

## 2024-09-28 PROCEDURE — 99223 1ST HOSP IP/OBS HIGH 75: CPT | Performed by: INTERNAL MEDICINE

## 2024-09-28 RX ORDER — FLUCONAZOLE 100 MG/1
200 TABLET ORAL DAILY
Status: DISCONTINUED | OUTPATIENT
Start: 2024-09-28 | End: 2024-09-28

## 2024-09-28 RX ORDER — MAGNESIUM SULFATE HEPTAHYDRATE 40 MG/ML
2000 INJECTION, SOLUTION INTRAVENOUS PRN
Status: DISCONTINUED | OUTPATIENT
Start: 2024-09-28 | End: 2024-09-30 | Stop reason: HOSPADM

## 2024-09-28 RX ORDER — ONDANSETRON 2 MG/ML
4 INJECTION INTRAMUSCULAR; INTRAVENOUS EVERY 6 HOURS PRN
Status: DISCONTINUED | OUTPATIENT
Start: 2024-09-28 | End: 2024-09-30 | Stop reason: HOSPADM

## 2024-09-28 RX ORDER — POLYETHYLENE GLYCOL 3350 17 G/17G
17 POWDER, FOR SOLUTION ORAL DAILY
Status: DISCONTINUED | OUTPATIENT
Start: 2024-09-28 | End: 2024-09-30 | Stop reason: HOSPADM

## 2024-09-28 RX ORDER — ONDANSETRON 4 MG/1
4 TABLET, ORALLY DISINTEGRATING ORAL EVERY 8 HOURS PRN
Status: DISCONTINUED | OUTPATIENT
Start: 2024-09-28 | End: 2024-09-30 | Stop reason: HOSPADM

## 2024-09-28 RX ORDER — BISACODYL 10 MG
10 SUPPOSITORY, RECTAL RECTAL DAILY PRN
Status: DISCONTINUED | OUTPATIENT
Start: 2024-09-28 | End: 2024-09-30 | Stop reason: HOSPADM

## 2024-09-28 RX ORDER — CLOPIDOGREL BISULFATE 75 MG/1
75 TABLET ORAL DAILY
Status: DISCONTINUED | OUTPATIENT
Start: 2024-09-28 | End: 2024-09-30 | Stop reason: HOSPADM

## 2024-09-28 RX ORDER — RANOLAZINE 500 MG/1
500 TABLET, EXTENDED RELEASE ORAL 2 TIMES DAILY
Status: DISCONTINUED | OUTPATIENT
Start: 2024-09-28 | End: 2024-09-30 | Stop reason: HOSPADM

## 2024-09-28 RX ORDER — ATORVASTATIN CALCIUM 10 MG/1
10 TABLET, FILM COATED ORAL DAILY
Status: DISCONTINUED | OUTPATIENT
Start: 2024-09-28 | End: 2024-09-30 | Stop reason: HOSPADM

## 2024-09-28 RX ORDER — DOCUSATE SODIUM 100 MG/1
100 CAPSULE, LIQUID FILLED ORAL 2 TIMES DAILY
Status: DISCONTINUED | OUTPATIENT
Start: 2024-09-28 | End: 2024-09-30 | Stop reason: HOSPADM

## 2024-09-28 RX ORDER — SODIUM CHLORIDE 9 MG/ML
INJECTION, SOLUTION INTRAVENOUS PRN
Status: DISCONTINUED | OUTPATIENT
Start: 2024-09-28 | End: 2024-09-30 | Stop reason: HOSPADM

## 2024-09-28 RX ORDER — ACETAMINOPHEN 325 MG/1
650 TABLET ORAL EVERY 6 HOURS PRN
Status: DISCONTINUED | OUTPATIENT
Start: 2024-09-28 | End: 2024-09-30 | Stop reason: HOSPADM

## 2024-09-28 RX ORDER — INSULIN LISPRO 100 [IU]/ML
0-4 INJECTION, SOLUTION INTRAVENOUS; SUBCUTANEOUS
Status: DISCONTINUED | OUTPATIENT
Start: 2024-09-28 | End: 2024-09-30 | Stop reason: HOSPADM

## 2024-09-28 RX ORDER — MAGNESIUM CARB/ALUMINUM HYDROX 105-160MG
45 TABLET,CHEWABLE ORAL ONCE
Status: COMPLETED | OUTPATIENT
Start: 2024-09-28 | End: 2024-09-28

## 2024-09-28 RX ORDER — INSULIN GLARGINE 100 [IU]/ML
10 INJECTION, SOLUTION SUBCUTANEOUS NIGHTLY
Status: DISCONTINUED | OUTPATIENT
Start: 2024-09-28 | End: 2024-09-30 | Stop reason: HOSPADM

## 2024-09-28 RX ORDER — LANOLIN ALCOHOL/MO/W.PET/CERES
3 CREAM (GRAM) TOPICAL NIGHTLY PRN
Status: DISCONTINUED | OUTPATIENT
Start: 2024-09-28 | End: 2024-09-30 | Stop reason: HOSPADM

## 2024-09-28 RX ORDER — INSULIN LISPRO 100 [IU]/ML
0-4 INJECTION, SOLUTION INTRAVENOUS; SUBCUTANEOUS NIGHTLY
Status: DISCONTINUED | OUTPATIENT
Start: 2024-09-28 | End: 2024-09-30 | Stop reason: HOSPADM

## 2024-09-28 RX ORDER — MIDODRINE HYDROCHLORIDE 2.5 MG/1
2.5 TABLET ORAL
Status: DISCONTINUED | OUTPATIENT
Start: 2024-09-28 | End: 2024-09-29

## 2024-09-28 RX ORDER — ENOXAPARIN SODIUM 100 MG/ML
40 INJECTION SUBCUTANEOUS DAILY
Status: DISCONTINUED | OUTPATIENT
Start: 2024-09-28 | End: 2024-09-30 | Stop reason: HOSPADM

## 2024-09-28 RX ORDER — DOXYCYCLINE 100 MG/1
100 CAPSULE ORAL 2 TIMES DAILY
Status: DISCONTINUED | OUTPATIENT
Start: 2024-09-28 | End: 2024-09-28

## 2024-09-28 RX ORDER — ROPINIROLE 2 MG/1
4 TABLET, FILM COATED ORAL NIGHTLY
Status: DISCONTINUED | OUTPATIENT
Start: 2024-09-28 | End: 2024-09-30 | Stop reason: HOSPADM

## 2024-09-28 RX ORDER — FUROSEMIDE 20 MG
20 TABLET ORAL DAILY
Status: DISCONTINUED | OUTPATIENT
Start: 2024-09-28 | End: 2024-09-30 | Stop reason: HOSPADM

## 2024-09-28 RX ORDER — POTASSIUM CHLORIDE 7.45 MG/ML
10 INJECTION INTRAVENOUS PRN
Status: DISCONTINUED | OUTPATIENT
Start: 2024-09-28 | End: 2024-09-30 | Stop reason: HOSPADM

## 2024-09-28 RX ORDER — POTASSIUM CHLORIDE 1500 MG/1
40 TABLET, EXTENDED RELEASE ORAL PRN
Status: DISCONTINUED | OUTPATIENT
Start: 2024-09-28 | End: 2024-09-30 | Stop reason: HOSPADM

## 2024-09-28 RX ORDER — SODIUM CHLORIDE 0.9 % (FLUSH) 0.9 %
5-40 SYRINGE (ML) INJECTION EVERY 12 HOURS SCHEDULED
Status: DISCONTINUED | OUTPATIENT
Start: 2024-09-28 | End: 2024-09-30 | Stop reason: HOSPADM

## 2024-09-28 RX ORDER — POLYETHYLENE GLYCOL 3350 17 G/17G
17 POWDER, FOR SOLUTION ORAL DAILY PRN
Status: DISCONTINUED | OUTPATIENT
Start: 2024-09-28 | End: 2024-09-28

## 2024-09-28 RX ORDER — DOXYCYCLINE 100 MG/1
100 CAPSULE ORAL 2 TIMES DAILY
Status: DISCONTINUED | OUTPATIENT
Start: 2024-09-28 | End: 2024-09-30 | Stop reason: HOSPADM

## 2024-09-28 RX ORDER — NITROFURANTOIN 25; 75 MG/1; MG/1
100 CAPSULE ORAL DAILY
Status: DISCONTINUED | OUTPATIENT
Start: 2024-09-28 | End: 2024-09-28

## 2024-09-28 RX ORDER — ASPIRIN 81 MG/1
81 TABLET, CHEWABLE ORAL DAILY
Status: DISCONTINUED | OUTPATIENT
Start: 2024-09-28 | End: 2024-09-30 | Stop reason: HOSPADM

## 2024-09-28 RX ORDER — ACETAMINOPHEN 650 MG/1
650 SUPPOSITORY RECTAL EVERY 6 HOURS PRN
Status: DISCONTINUED | OUTPATIENT
Start: 2024-09-28 | End: 2024-09-30 | Stop reason: HOSPADM

## 2024-09-28 RX ORDER — SODIUM CHLORIDE 0.9 % (FLUSH) 0.9 %
10 SYRINGE (ML) INJECTION PRN
Status: DISCONTINUED | OUTPATIENT
Start: 2024-09-28 | End: 2024-09-30 | Stop reason: HOSPADM

## 2024-09-28 RX ADMIN — DOXYCYCLINE 100 MG: 100 CAPSULE ORAL at 13:42

## 2024-09-28 RX ADMIN — DOCUSATE SODIUM 100 MG: 100 CAPSULE, LIQUID FILLED ORAL at 13:40

## 2024-09-28 RX ADMIN — RANOLAZINE 500 MG: 500 TABLET, EXTENDED RELEASE ORAL at 20:28

## 2024-09-28 RX ADMIN — SODIUM CHLORIDE, PRESERVATIVE FREE 10 ML: 5 INJECTION INTRAVENOUS at 09:36

## 2024-09-28 RX ADMIN — SODIUM CHLORIDE, PRESERVATIVE FREE 10 ML: 5 INJECTION INTRAVENOUS at 20:29

## 2024-09-28 RX ADMIN — ROPINIROLE HYDROCHLORIDE 4 MG: 2 TABLET, FILM COATED ORAL at 20:28

## 2024-09-28 RX ADMIN — RANOLAZINE 500 MG: 500 TABLET, EXTENDED RELEASE ORAL at 02:18

## 2024-09-28 RX ADMIN — MINERAL OIL 45 ML: 1000 SOLUTION ORAL at 09:32

## 2024-09-28 RX ADMIN — ASPIRIN 81 MG 81 MG: 81 TABLET ORAL at 09:28

## 2024-09-28 RX ADMIN — ENOXAPARIN SODIUM 40 MG: 100 INJECTION SUBCUTANEOUS at 09:31

## 2024-09-28 RX ADMIN — ATORVASTATIN CALCIUM 10 MG: 10 TABLET, FILM COATED ORAL at 09:28

## 2024-09-28 RX ADMIN — INSULIN GLARGINE 10 UNITS: 100 INJECTION, SOLUTION SUBCUTANEOUS at 20:29

## 2024-09-28 RX ADMIN — CLOPIDOGREL BISULFATE 75 MG: 75 TABLET ORAL at 09:29

## 2024-09-28 RX ADMIN — DOXYCYCLINE 100 MG: 100 CAPSULE ORAL at 20:28

## 2024-09-28 RX ADMIN — RANOLAZINE 500 MG: 500 TABLET, EXTENDED RELEASE ORAL at 09:28

## 2024-09-28 RX ADMIN — FUROSEMIDE 20 MG: 20 TABLET ORAL at 09:28

## 2024-09-28 RX ADMIN — DICLOFENAC SODIUM 4 G: 10 GEL TOPICAL at 20:29

## 2024-09-28 RX ADMIN — VANCOMYCIN HYDROCHLORIDE 1750 MG: 1 INJECTION, POWDER, LYOPHILIZED, FOR SOLUTION INTRAVENOUS at 01:08

## 2024-09-28 RX ADMIN — POLYETHYLENE GLYCOL 3350 17 G: 17 POWDER, FOR SOLUTION ORAL at 14:03

## 2024-09-28 RX ADMIN — DOCUSATE SODIUM 100 MG: 100 CAPSULE, LIQUID FILLED ORAL at 20:29

## 2024-09-28 RX ADMIN — ONDANSETRON 4 MG: 2 INJECTION INTRAMUSCULAR; INTRAVENOUS at 03:47

## 2024-09-28 RX ADMIN — ROPINIROLE HYDROCHLORIDE 4 MG: 2 TABLET, FILM COATED ORAL at 02:35

## 2024-09-28 RX ADMIN — LEVOFLOXACIN 750 MG: 5 INJECTION, SOLUTION INTRAVENOUS at 23:19

## 2024-09-28 ASSESSMENT — PAIN DESCRIPTION - LOCATION
LOCATION: ABDOMEN
LOCATION: KNEE

## 2024-09-28 ASSESSMENT — PAIN DESCRIPTION - ORIENTATION: ORIENTATION: LEFT

## 2024-09-28 ASSESSMENT — PAIN SCALES - GENERAL
PAINLEVEL_OUTOF10: 3
PAINLEVEL_OUTOF10: 5
PAINLEVEL_OUTOF10: 0

## 2024-09-28 ASSESSMENT — PAIN DESCRIPTION - DESCRIPTORS: DESCRIPTORS: DISCOMFORT

## 2024-09-28 NOTE — PROGRESS NOTES
[facility] Internal Medicine  Skinny Anders MD; Rosendo Tena MD; Sohan Aguilar MD; MD Heydi Scott MD; Lavern Rodgers MD  [facility] Internal Medicine   IN-PATIENT SERVICE  [facility]                 Date:   9/27/2024  Patientname:  Komal Crawford  Date of admission:  9/27/2024  4:40 PM  MRN:   [id number]  Account:  [id number]  YOB: 1940  PCP:    Sabiha Bedolla MD  Room:   10/10  Code Status:    Full      Chief Complaint:     Chief Complaint   Patient presents with    Urinary Catheter     Pt had suprapubic catheter placed approx 2 weeks ago, today having hematuria, having abdominal pain, states not feeling well    Hematuria       History of Present Illness:     Komal Crawford is a 84 y.o. Non- / non  female with a history of CVA, GERD, diabetes mellitus, osteoarthritis, and urogenital implant who presents with Urinary Catheter (Pt had suprapubic catheter placed approx 2 weeks ago, today having hematuria, having abdominal pain, states not feeling well) and Hematuria   and is admitted to the hospital for the management of UTI (urinary tract infection).    According to patient, she has been suffering with a feeling of abdominal fullness, malaise, abdominal pain, and hematuria for the past day.  She reports episode of vomiting 2 days prior.  She also reports urethra and rectal spasms.  She had suprapubic catheter replacement approximately 2 weeks ago due to neurogenic bladder.    Upon presentation to the ER she was hypertensive and mildly hypokalemic.  Her urine was positive for both leukocyte Estrace and nitrites.  Her abdominal CT was concerning for stercoral colitis.  Both surgery and urology were consulted, and fecal disimpaction was performed.  Upon assessment the patient was alert, oriented, and pleasant with generalized abdominal tenderness.  There was no guarding.  Pulmonary effort was normal.  Heart sounds were S1-S2 no murmurs or rubs

## 2024-09-28 NOTE — CONSULTS
Intolerance-unknown    Lisinopril      cough    Penicillin G     Toviaz [Fesoterodine Fumarate Er]     Clonazepam Other (See Comments)     From neuro: made her too sleepy    Metformin And Related Nausea And Vomiting     Diarrhea and N/V       Social History:   Social History     Socioeconomic History    Marital status:      Spouse name: Marty    Number of children: 0    Years of education: 12    Highest education level: Not on file   Occupational History    Occupation: retired   Tobacco Use    Smoking status: Former     Current packs/day: 0.00     Average packs/day: 0.5 packs/day for 30.0 years (15.0 ttl pk-yrs)     Types: Cigarettes     Start date: 1952     Quit date: 1982     Years since quittin.3     Passive exposure: Past    Smokeless tobacco: Former     Quit date: 1982   Vaping Use    Vaping status: Never Used   Substance and Sexual Activity    Alcohol use: No     Comment: very rare glass of wine    Drug use: No    Sexual activity: Not Currently   Other Topics Concern    Not on file   Social History Narrative    Not on file     Social Determinants of Health     Financial Resource Strain: Low Risk  (2024)    Overall Financial Resource Strain (CARDIA)     Difficulty of Paying Living Expenses: Not hard at all   Food Insecurity: No Food Insecurity (2024)    Hunger Vital Sign     Worried About Running Out of Food in the Last Year: Never true     Ran Out of Food in the Last Year: Never true   Transportation Needs: No Transportation Needs (2024)    PRAPARE - Transportation     Lack of Transportation (Medical): No     Lack of Transportation (Non-Medical): No   Physical Activity: Inactive (2023)    Exercise Vital Sign     Days of Exercise per Week: 0 days     Minutes of Exercise per Session: 0 min   Stress: Stress Concern Present (2023)    [employer] [place] [employer] - Occupational Stress Questionnaire     Feeling of Stress : To some extent   Social  Closed fracture of left hip with routine healing    Rectal prolapse    Sepsis (HCC)    Severe sepsis (HCC)    Cellulitis of left lower extremity    Infection of total knee replacement (HCC)    Acute cystitis without hematuria    Open wound of left knee    Allergy to multiple antibiotics    Infection of total left knee replacement, sequela    Somnolence    Neurogenic bladder    Longstanding persistent atrial fibrillation (HCC)    Parkinson's disease    Closed fracture of right fibula and tibia    UTI (urinary tract infection)           Plan:   Urine has returned yellow.  Okay for discharge.  CT scan independently reviewed and verified.  No acute pathology.  Patient to follow-up with her urologist outpatient.  Appointment has already been scheduled for 1 to 2 weeks    Thank you for involving us in the care of Komal Crawford. Should you have any questions, please do not hesitate to contact us at any time.    Damian Whyte MD  6:52 PM 9/28/2024

## 2024-09-28 NOTE — PROGRESS NOTES
[facility]   Pharmacy Pharmacokinetic Monitoring Service - Vancomycin     Komal Crawford is a 84 y.o. female starting on vancomycin therapy for SSTI and UTI. Pharmacy consulted by Dr. Mullen for monitoring and adjustment.    Target Concentration: Goal trough of 10-15 mg/L and AUC/SARAH <500 mg*hr/L    Additional Antimicrobials: Levaquin    Pertinent Laboratory Values:   Wt Readings from Last 1 Encounters:   09/28/24 66.2 kg (145 lb 15.1 oz)     Temp Readings from Last 1 Encounters:   09/28/24 97.7 °F (36.5 °C) (Oral)     Estimated Creatinine Clearance: 52 mL/min (based on SCr of 0.7 mg/dL).  Recent Labs     09/27/24  1702   CREATININE 0.7   BUN 23   WBC 7.3     Procalcitonin:     Pertinent Cultures:  Culture Date Source Results   9/27 urine    MRSA Nasal Swab: N/A. Non-respiratory infection.    Plan:  Dosing recommendations based on Bayesian software  Start vancomycin 1750 mg LD then 1250 mg daily.  Anticipated AUC of 441 and trough concentration of 10.1 at steady state  Renal labs as indicated   Vancomycin concentration ordered for 09/29/24 @ 0600   Pharmacy will continue to monitor patient and adjust therapy as indicated    Loading dose: 1750 mg at 01:08 09/28/2024.  Regimen: 1250 mg IV every 24 hours.  Start time: 01:08 on 09/29/2024  Exposure target: AUC24 (range)400-500 mg/L.hr   [id number]: 419 mg/L.hr  AUC24,ss: 441 mg/L.hr  Probability of AUC24 > 400: 62 %  Ctrough,ss: 10.1 mg/L  Probability of Ctrough,ss > 20: 4 %    Thank you for the consult,  Alexis Tracey RPH  9/28/2024 4:10 AM

## 2024-09-28 NOTE — PLAN OF CARE
Problem: Pain  Goal: Verbalizes/displays adequate comfort level or baseline comfort level  Outcome: Progressing  Flowsheets (Taken 9/28/2024 0401)  Verbalizes/displays adequate comfort level or baseline comfort level:   Encourage patient to monitor pain and request assistance   Assess pain using appropriate pain scale  Note: Pt Continued to assess pain level with appropriate pain scale.       Problem: Skin/Tissue Integrity  Goal: Absence of new skin breakdown  Outcome: Progressing  Note: Pt has no new skin breakdown this shift. Pt turned q2h. Barrier cream applied      Problem: Musculoskeletal - Adult  Goal: Return ADL status to a safe level of function  Outcome: Progressing     Problem: Genitourinary - Adult  Goal: Urinary catheter remains patent  Outcome: Progressing  Flowsheets (Taken 9/28/2024 0401)  Urinary catheter remains patent: Assess patency of urinary catheter  Note: Chronic suprapubic cath, some drainage noted       Problem: Gastrointestinal - Adult  Goal: Minimal or absence of nausea and vomiting  Flowsheets (Taken 9/28/2024 0401)  Minimal or absence of nausea and vomiting:   Provide nonpharmacologic comfort measures as appropriate   Administer IV fluids as ordered to ensure adequate hydration   Administer ordered antiemetic medications as needed     Problem: Infection - Adult  Goal: Absence of infection at discharge  Outcome: Progressing

## 2024-09-28 NOTE — FLOWSHEET NOTE
09/28/24 0045   Belongings   Dental Appliances Uppers;Partials  (lowers partials)   Vision - Corrective Lenses Eyeglasses;Other (Comment)  (2 pairs- sungalsses and regular glassed)   Hearing Aid None   Clothing Other (Comment);Pants;Socks;Footwear  (Brace)   Jewelry Watch;Ring;Necklace;Earrings   Body Piercings Removed N/A   Electronic Devices Cell Phone;Other (Comment)  (Dexcom monitor)   Weapons (Notify Protective Services/Security) None   Other Valuables Purse;Wallet   Home Medications None   Valuables Given To Family (Comment)  ( has keys)   Provide Name(s) of Who Valuable(s) Were Given To patient at bedside

## 2024-09-28 NOTE — ED NOTES
H/o neurogenic bladder, suprapubic catheter noted.   
I went in with Dr. Mullen and chaperoned rectal exam on patient.  
Pt. Off unit in [place]  
Pt. States that this AM her urine was clear, as the afternoon passed she said her urine output decreased.  She decided to irrigate her suprapubic cath. And it flushed fine, but she noticed some sediment.  Pt. States after that she started having spasms and noticed that her urine was now coming out with blood in it.  
Report given to Richelle Tinoco RN  
TRANSFER - OUT REPORT:    Verbal report given to Piter DAVIDSON on Komal Crawford  being transferred to 2043 for routine progression of patient care       Report consisted of patient's Situation, Background, Assessment and   Recommendations(SBAR).     Information from the following report(s) ED Encounter Summary was reviewed with the receiving nurse.    Denny Fall Assessment:    Presents to emergency department  because of falls (Syncope, seizure, or loss of consciousness): No  Age > 70: Yes  Altered Mental Status, Intoxication with alcohol or substance confusion (Disorientation, impaired judgment, poor safety awaremess, or inability to follow instructions): No  Impaired Mobility: Ambulates or transfers with assistive devices or assistance; Unable to ambulate or transer.: Yes  Nursing Judgement: Yes          Lines:   Peripheral IV 09/27/24 Right Forearm (Active)   Site Assessment Clean, dry & intact 09/27/24 1703   Line Status Brisk blood return 09/27/24 1703   Line Care Connections checked and tightened 09/27/24 1703   Phlebitis Assessment No symptoms 09/27/24 1703   Infiltration Assessment 0 09/27/24 1703   Dressing Status Clean, dry & intact 09/27/24 1703   Dressing Type Transparent 09/27/24 1703        Opportunity for questions and clarification was provided.      Patient transported with:  Tech      
Detail Level: Zone

## 2024-09-28 NOTE — H&P
x1    Tinea corporis     Tinnitus, unspecified ear     Tubular adenoma     colon polyps    Type 2 diabetes mellitus with diabetic autonomic (poly)neuropathy (HCC)     Type 2 diabetes mellitus with diabetic peripheral angiopathy without gangrene (HCC)     Type II or unspecified type diabetes mellitus without mention of complication, not stated as uncontrolled     Unspecified open wound, left lower leg, subsequent encounter     Unsteadiness on feet     Urinary incontinence     frequent UTI s/p infection, surgical dilatation lead to incontinence    UTI (urinary tract infection)     Vitamin D deficiency, unspecified     Weakness     Wears dentures     full on top, partial on bottom    Wears glasses         Past Surgical History:     Past Surgical History:   Procedure Laterality Date    APPENDECTOMY  1962    BLADDER SURGERY      bladder stimulator    BLADDER SUSPENSION  2002    multiple    CARDIAC CATHETERIZATION  2008    no stents    CARDIOVASCULAR STRESS TEST  12/2014    CATARACT REMOVAL WITH IMPLANT Left 11/07/2017    Raffoul/Sulaiman    CATARACT REMOVAL WITH IMPLANT Right 11/28/2017    Raffoalhaji/Sulaiman    COLON SURGERY      colostomy reversal    COLONOSCOPY  04/07/2016    tubular adenoma x3; internal hemorrhoids    COLONOSCOPY N/A 11/06/2019    COLONOSCOPY DIAGNOSTIC performed by Eli Marinelli MD at [facility]    COLONOSCOPY  11/06/2019    COLOSTOMY  1986    bowel obstruction/ rupture from diverticular disease, reversal 3 mos later    CORONARY ANGIOPLASTY WITH STENT PLACEMENT  08/04/2022    x2    CYSTOSCOPY  09/08/2017    W/ 200IU Botox     CYSTOSCOPY N/A 9/6/2024    CYSTOSCOPY SUPRAPUBIC TUBE PLACEMENT performed by Omega Lawson MD at [facility] OR    FRACTURE SURGERY Right 2011    hip    FRACTURE SURGERY Left 2001    WRIST    FRACTURE SURGERY Left 2013    ankle    HERNIA REPAIR      HIP SURGERY Right 11/06/2023    HIP HARDWARE REMOVAL - WITH DEEP HARDWARE REMOVAL 7.3 NABILA SCREW   X3     Eosinophils Absolute 0.20 0.0 - 0.4 k/uL    Basophils Absolute 0.00 0.0 - 0.2 k/uL   Comprehensive Metabolic Panel    Collection Time: 09/27/24  5:02 PM   Result Value Ref Range    Sodium 139 135 - 144 mmol/L    Potassium 3.5 (L) 3.7 - 5.3 mmol/L    Chloride 105 98 - 107 mmol/L    CO2 23 20 - 31 mmol/L    Anion Gap 11 9 - 17 mmol/L    Glucose 123 (H) 70 - 99 mg/dL    BUN 23 8 - 23 mg/dL    Creatinine 0.7 0.5 - 0.9 mg/dL    Est, Glom Filt Rate 86 >60 mL/min/1.73m2    Calcium 9.1 8.6 - 10.4 mg/dL    Total Protein 6.8 6.4 - 8.3 g/dL    Albumin 3.4 (L) 3.5 - 5.2 g/dL    Total Bilirubin 0.4 0.3 - 1.2 mg/dL    Alkaline Phosphatase 109 (H) 35 - 104 U/L    ALT 8 5 - 33 U/L    AST 11 <32 U/L   Lactic Acid    Collection Time: 09/27/24  5:02 PM   Result Value Ref Range    Lactic Acid 1.0 0.5 - 2.2 mmol/L   Urinalysis with Reflex to Culture    Collection Time: 09/27/24  5:30 PM    Specimen: Urine   Result Value Ref Range    Color, UA Red (A) Yellow    Turbidity UA Turbid (A) Clear    Glucose, Ur NEGATIVE NEGATIVE mg/dL    Bilirubin, Urine SMALL (A) NEGATIVE    Ketones, Urine NEGATIVE NEGATIVE mg/dL    Specific Gravity, UA 1.023 1.000 - 1.030    Urine Hgb MOD (A) NEGATIVE    pH, Urine 8.5 (H) 5.0 - 8.0    Protein, UA 2+ (A) NEGATIVE mg/dL    Urobilinogen, Urine Normal 0.0 - 1.0 EU/dL    Nitrite, Urine POSITIVE (A) NEGATIVE    Leukocyte Esterase, Urine LARGE (A) NEGATIVE    Comment       INTERPRET WITH CAUTION DUE TO INTENSE COLOR OF URINE.   Microscopic Urinalysis    Collection Time: 09/27/24  5:30 PM   Result Value Ref Range    WBC, UA TOO NUMEROUS TO COUNT (A) 0 TO 5 /HPF    RBC, UA TOO NUMEROUS TO COUNT (A) 0 TO 2 /HPF    Casts UA 3 to 5 (A) None /LPF    Epithelial Cells, UA 0 TO 2 /HPF    Yeast, UA FEW (A) None   Basic Metabolic Panel w/ Reflex to MG    Collection Time: 09/28/24  7:37 AM   Result Value Ref Range    Sodium 135 135 - 144 mmol/L    Potassium 3.9 3.7 - 5.3 mmol/L    Chloride 102 98 - 107 mmol/L    CO2 23 20 -

## 2024-09-28 NOTE — PLAN OF CARE
Problem: Discharge Planning  Goal: Discharge to home or other facility with appropriate resources  Outcome: Progressing  Flowsheets (Taken 9/28/2024 0748)  Discharge to home or other facility with appropriate resources:   Identify barriers to discharge with patient and caregiver   Arrange for needed discharge resources and transportation as appropriate   Identify discharge learning needs (meds, wound care, etc)     Problem: Pain  Goal: Verbalizes/displays adequate comfort level or baseline comfort level  9/28/2024 1704 by Nisreen Olmstead RN  Outcome: Progressing  9/28/2024 0401 by Dary Sam RN  Outcome: Progressing  Flowsheets (Taken 9/28/2024 0401)  Verbalizes/displays adequate comfort level or baseline comfort level:   Encourage patient to monitor pain and request assistance   Assess pain using appropriate pain scale  Note: Pt Continued to assess pain level with appropriate pain scale.       Problem: Safety - Adult  Goal: Free from fall injury  Outcome: Progressing     Problem: Skin/Tissue Integrity  Goal: Absence of new skin breakdown  Description: 1.  Monitor for areas of redness and/or skin breakdown  2.  Assess vascular access sites hourly  3.  Every 4-6 hours minimum:  Change oxygen saturation probe site  4.  Every 4-6 hours:  If on nasal continuous positive airway pressure, respiratory therapy assess nares and determine need for appliance change or resting period.  9/28/2024 1704 by Nisreen Olmstead RN  Outcome: Progressing  9/28/2024 0401 by Dary Sam RN  Outcome: Progressing  Note: Pt has no new skin breakdown this shift. Pt turned q2h. Barrier cream applied      Problem: ABCDS Injury Assessment  Goal: Absence of physical injury  Outcome: Progressing     Problem: Musculoskeletal - Adult  Goal: Return mobility to safest level of function  Outcome: Progressing  Goal: Return ADL status to a safe level of function  9/28/2024 1704 by Nisreen Olmstead, RN  Outcome: Progressing  9/28/2024

## 2024-09-28 NOTE — CARE COORDINATION
Case Management Assessment  Initial Evaluation    Date/Time of Evaluation: 9/28/2024 10:35 AM  Assessment Completed by: Lula Mcwilliams RN    If patient is discharged prior to next notation, then this note serves as note for discharge by case management.    Patient Name: Komal Crawford                   YOB: 1940  Diagnosis: UTI (urinary tract infection) [N39.0]  Acute cystitis with hematuria [N30.01]  Stercoral colitis [K52.89]                   Date / Time: 9/27/2024  4:40 PM    Patient Admission Status: Inpatient   Readmission Risk (Low < 19, Mod (19-27), High > 27): Readmission Risk Score: 25    Current PCP: Sabiha Bedolla MD  PCP verified by CM? Yes    Chart Reviewed: Yes      History Provided by: Patient  Patient Orientation: Alert and Oriented    Patient Cognition: Alert    Hospitalization in the last 30 days (Readmission):  No    If yes, Readmission Assessment in CM Navigator will be completed.    Advance Directives:      Code Status: Full Code   Patient's Primary Decision Maker is: Legal Next of Kin    Primary Decision Maker: Ritchie Crawford - Spouse - [phone]    Discharge Planning:    Patient lives with: Spouse/Significant Other Type of Home: Trailer/Mobile Home  Primary Care Giver: Self  Patient Support Systems include: Spouse/Significant Other   Current Financial resources: Medicare  Current community resources: Other (Comment) ([facility] on [address])  Current services prior to admission: Durable Medical Equipment            Current DME: Home Aerosol, Walker, Wheelchair, Cane (Dex Com)            Type of Home Care services:  PT    ADLS  Prior functional level: Assistance with the following:, Bathing, Dressing, Cooking, Housework, Shopping  Current functional level: Assistance with the following:, Bathing, Dressing, Cooking, Housework, Shopping    PT AM-PAC:   /24  OT AM-PAC:   /24    Family can provide assistance at DC: Yes  Would you like Case Management to discuss the  discharge plan with any other family members/significant others, and if so, who?    Plans to Return to Present Housing: Yes  Other Identified Issues/Barriers to RETURNING to current housing: Needs VNS  Potential Assistance needed at discharge: Home Care            Potential DME:    Patient expects to discharge to: Trailer/mobile home  Plan for transportation at discharge: Family    Financial    Payor: MEDICARE / Plan: MEDICARE PART A AND B / Product Type: *No Product type* /     Does insurance require precert for SNF: No    Potential assistance Purchasing Medications:    Meds-to-Beds request:        [facility] - [place], [place] - [address] -  [phone] - F [phone]  [address]  [place] [address]  Phone: [phone] Fax: [phone]      Notes:    Factors facilitating achievement of predicted outcomes: Family support, Cooperative, Pleasant, and Has needed Durable Medical Equipment at home    Barriers to discharge: Needs VNS    Additional Case Management Notes: 9/28/24 Medicare Pt. Lives in Mobile Home w/ Ramp w/ . DME- Walker, DEEPTHI, Cane, Neb, Dex Com, SC/GB. Wants VNS- Referral sent to [facility], per Pt. Request, following for acceptance,Does not want [facility], IV Antibiotics, PT/OT, Surgery/Urology, Has been to [facility] in past, [place] 25%. Dede will need signed/completed//KB    The Plan for Transition of Care is related to the following treatment goals of UTI (urinary tract infection) [N39.0]  Acute cystitis with hematuria [N30.01]  Stercoral colitis [K52.89]    IF APPLICABLE: The Patient and/or patient representative Komal and her family were provided with a choice of provider and agrees with the discharge plan. Freedom of choice list with basic dialogue that supports the patient's individualized plan of care/goals and shares the quality data associated with the providers was provided to: Patient   Patient Representative Name:       The Patient and/or Patient Representative Agree with the Discharge Plan?

## 2024-09-28 NOTE — CONSULTS
General Surgery  Consultation        PATIENT NAME: Komal Crawford   YOB: 1940    ADMISSION DATE: 9/27/2024  4:40 PM     Consulting Physician: Dr Gonzalez     TODAY'S DATE: 9/28/2024    Reason for consult: Rectal prolapse with fecal impaction and abnormal CAT scan    Chief complaint: Fecal impaction    HISTORY OF PRESENT ILLNESS:  The patient is a 84 y.o. female  who presented to the [facility] emergency department with long history of neurogenic bladder and status post recent placement of suprapubic catheter with complaints of periumbilical abdominal pain and urethral spasm.  Patient has also had a long history of rectal prolapse with fecal impaction and has had a colostomy in the past with subsequent reversal due to perforated diverticulitis.  Patient had CAT scan of abdomen and pelvis demonstrating he had another fecal impaction with some surrounding colitis.  Patient was disimpacted in the emergency department according to the patient.  Patient had red appearing urine from her suprapubic catheter which led to her coming to the emergency department.  General surgery consulted regarding fecal impaction.  Patient had  scheduled surgery for replacement of colostomy due to recurrent and chronic constipation but had intervening difficulty with her hips which postponed her surgery.    Past Medical History:        Diagnosis Date    Acquired absence of both cervix and uterus     Acquired absence of other organs     Age-related cognitive decline     Age-related osteoporosis without current pathological fracture     Ankle pain     Left ankle fracture in ortho boat.    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    Body mass index 31.0-31.9, adult     Caffeine use     2 coffee/day, 1-2 tea per week    Cataract  hrs prn  6/25/24 Per [facility], they have have never had a prescription for this  nitroGLYCERIN (NITROSTAT) 0.4 MG SL tablet,   ipratropium (ATROVENT) 0.02 % nebulizer solution, Inhale 2.5 mLs into the lungs  Multiple Vitamins-Minerals (THERAPEUTIC MULTIVITAMIN-MINERALS) tablet, Take 1 tablet by mouth daily  omeprazole (PRILOSEC) 20 MG delayed release capsule, Take 1 capsule by mouth daily  senna (SENOKOT) 8.6 MG tablet, Take 1 tablet by mouth 2 times daily For constipation (Patient not taking: Reported on 9/23/2024)  aspirin 81 MG chewable tablet, Take 1 tablet by mouth daily  diclofenac sodium (VOLTAREN) 1 % GEL, Apply 4 g topically 2 times daily  ranolazine (RANEXA) 500 MG extended release tablet, Take 1 tablet by mouth 2 times daily  polyethylene glycol (GLYCOLAX) 17 g packet, Take 1 packet by mouth daily as needed for Constipation (Patient not taking: Reported on 9/23/2024)  insulin glargine (LANTUS SOLOSTAR) 100 UNIT/ML injection pen, Inject 10 Units into the skin nightly  Insulin Pen Needle ([employer] PEN NEEDLES 29G) 29G X 12MM MISC, 1 each by Does not apply route daily  acetaminophen (TYLENOL) 500 MG tablet, Take 2 tablets by mouth every 6 hours as needed for Pain (Patient not taking: Reported on 9/20/2024)  Continuous Blood Gluc Sensor (FREESTYLE DAVIS 14 DAY SENSOR) MISC,   CRANBERRY PO, Take by mouth 2 times daily    Allergies:  Iodides, Iodinated contrast media, Povidone-iodine, Sulfa antibiotics, Betadine [povidone iodine], Cephalexin, Cephalexin, Cozaar [losartan], Cymbalta [duloxetine hcl], Demerol, Doxycycline, Duloxetine, Meperidine, Morphine, Penicillins, Zithromax [azithromycin], Ciprofloxacin, Clindamycin/lincomycin, Etodolac, Fesoterodine, Fluorescein, Iodine, Lisinopril, Penicillin g, Toviaz [fesoterodine fumarate er], Clonazepam, and Metformin and related    Social History:   Social History     Socioeconomic History    Marital status:      Spouse name: Marty    Number of children: 0

## 2024-09-29 LAB
ANION GAP SERPL CALCULATED.3IONS-SCNC: 10 MMOL/L (ref 9–17)
BASOPHILS # BLD: 0.1 K/UL (ref 0–0.2)
BASOPHILS NFR BLD: 1 % (ref 0–2)
BUN SERPL-MCNC: 19 MG/DL (ref 8–23)
CALCIUM SERPL-MCNC: 8.7 MG/DL (ref 8.6–10.4)
CHLORIDE SERPL-SCNC: 103 MMOL/L (ref 98–107)
CO2 SERPL-SCNC: 22 MMOL/L (ref 20–31)
CREAT SERPL-MCNC: 0.8 MG/DL (ref 0.5–0.9)
EOSINOPHIL # BLD: 0.2 K/UL (ref 0–0.4)
EOSINOPHILS RELATIVE PERCENT: 4 % (ref 0–4)
ERYTHROCYTE [DISTWIDTH] IN BLOOD BY AUTOMATED COUNT: 17.2 % (ref 11.5–14.9)
GFR, ESTIMATED: 73 ML/MIN/1.73M2
GLUCOSE BLD-MCNC: 147 MG/DL (ref 65–105)
GLUCOSE BLD-MCNC: 216 MG/DL (ref 65–105)
GLUCOSE BLD-MCNC: 90 MG/DL (ref 65–105)
GLUCOSE SERPL-MCNC: 117 MG/DL (ref 70–99)
HCT VFR BLD AUTO: 31.8 % (ref 36–46)
HGB BLD-MCNC: 10.4 G/DL (ref 12–16)
LYMPHOCYTES NFR BLD: 1.4 K/UL (ref 1–4.8)
LYMPHOCYTES RELATIVE PERCENT: 23 % (ref 24–44)
MCH RBC QN AUTO: 28.6 PG (ref 26–34)
MCHC RBC AUTO-ENTMCNC: 32.8 G/DL (ref 31–37)
MCV RBC AUTO: 87.3 FL (ref 80–100)
MICROORGANISM SPEC CULT: ABNORMAL
MONOCYTES NFR BLD: 0.6 K/UL (ref 0.1–1.3)
MONOCYTES NFR BLD: 9 % (ref 1–7)
NEUTROPHILS NFR BLD: 63 % (ref 36–66)
NEUTS SEG NFR BLD: 3.9 K/UL (ref 1.3–9.1)
PLATELET # BLD AUTO: 143 K/UL (ref 150–450)
PMV BLD AUTO: 12 FL (ref 6–12)
POTASSIUM SERPL-SCNC: 4.3 MMOL/L (ref 3.7–5.3)
RBC # BLD AUTO: 3.65 M/UL (ref 4–5.2)
SODIUM SERPL-SCNC: 135 MMOL/L (ref 135–144)
SPECIMEN DESCRIPTION: ABNORMAL
WBC OTHER # BLD: 6.2 K/UL (ref 3.5–11)

## 2024-09-29 PROCEDURE — 6370000000 HC RX 637 (ALT 250 FOR IP)

## 2024-09-29 PROCEDURE — 82947 ASSAY GLUCOSE BLOOD QUANT: CPT

## 2024-09-29 PROCEDURE — 6360000002 HC RX W HCPCS

## 2024-09-29 PROCEDURE — 80048 BASIC METABOLIC PNL TOTAL CA: CPT

## 2024-09-29 PROCEDURE — 97162 PT EVAL MOD COMPLEX 30 MIN: CPT

## 2024-09-29 PROCEDURE — 2580000003 HC RX 258

## 2024-09-29 PROCEDURE — 85025 COMPLETE CBC W/AUTO DIFF WBC: CPT

## 2024-09-29 PROCEDURE — 6370000000 HC RX 637 (ALT 250 FOR IP): Performed by: SURGERY

## 2024-09-29 PROCEDURE — 36415 COLL VENOUS BLD VENIPUNCTURE: CPT

## 2024-09-29 PROCEDURE — 99232 SBSQ HOSP IP/OBS MODERATE 35: CPT | Performed by: INTERNAL MEDICINE

## 2024-09-29 PROCEDURE — 97116 GAIT TRAINING THERAPY: CPT

## 2024-09-29 PROCEDURE — 1200000000 HC SEMI PRIVATE

## 2024-09-29 PROCEDURE — 6370000000 HC RX 637 (ALT 250 FOR IP): Performed by: INTERNAL MEDICINE

## 2024-09-29 RX ORDER — METOCLOPRAMIDE 10 MG/1
10 TABLET ORAL
Status: DISCONTINUED | OUTPATIENT
Start: 2024-09-29 | End: 2024-09-30 | Stop reason: HOSPADM

## 2024-09-29 RX ORDER — FAMOTIDINE 20 MG/1
20 TABLET, FILM COATED ORAL 2 TIMES DAILY
Status: DISCONTINUED | OUTPATIENT
Start: 2024-09-29 | End: 2024-09-30 | Stop reason: HOSPADM

## 2024-09-29 RX ORDER — BENZOCAINE/MENTHOL 6 MG-10 MG
LOZENGE MUCOUS MEMBRANE 2 TIMES DAILY
Status: DISCONTINUED | OUTPATIENT
Start: 2024-09-29 | End: 2024-09-30 | Stop reason: HOSPADM

## 2024-09-29 RX ORDER — MIDODRINE HYDROCHLORIDE 2.5 MG/1
2.5 TABLET ORAL 3 TIMES DAILY PRN
Status: DISCONTINUED | OUTPATIENT
Start: 2024-09-29 | End: 2024-09-30 | Stop reason: HOSPADM

## 2024-09-29 RX ORDER — MAGNESIUM CARB/ALUMINUM HYDROX 105-160MG
45 TABLET,CHEWABLE ORAL ONCE
Status: COMPLETED | OUTPATIENT
Start: 2024-09-29 | End: 2024-09-29

## 2024-09-29 RX ADMIN — FAMOTIDINE 20 MG: 20 TABLET, FILM COATED ORAL at 21:00

## 2024-09-29 RX ADMIN — RANOLAZINE 500 MG: 500 TABLET, EXTENDED RELEASE ORAL at 21:00

## 2024-09-29 RX ADMIN — LEVOFLOXACIN 750 MG: 5 INJECTION, SOLUTION INTRAVENOUS at 22:40

## 2024-09-29 RX ADMIN — ASPIRIN 81 MG 81 MG: 81 TABLET ORAL at 09:14

## 2024-09-29 RX ADMIN — DOXYCYCLINE 100 MG: 100 CAPSULE ORAL at 21:00

## 2024-09-29 RX ADMIN — METOCLOPRAMIDE 10 MG: 10 TABLET ORAL at 17:10

## 2024-09-29 RX ADMIN — POLYETHYLENE GLYCOL 3350 17 G: 17 POWDER, FOR SOLUTION ORAL at 09:14

## 2024-09-29 RX ADMIN — FAMOTIDINE 20 MG: 20 TABLET, FILM COATED ORAL at 12:01

## 2024-09-29 RX ADMIN — RANOLAZINE 500 MG: 500 TABLET, EXTENDED RELEASE ORAL at 09:14

## 2024-09-29 RX ADMIN — DICLOFENAC SODIUM 4 G: 10 GEL TOPICAL at 09:13

## 2024-09-29 RX ADMIN — INSULIN GLARGINE 10 UNITS: 100 INJECTION, SOLUTION SUBCUTANEOUS at 21:02

## 2024-09-29 RX ADMIN — HYDROCORTISONE: 1 CREAM TOPICAL at 21:01

## 2024-09-29 RX ADMIN — DOCUSATE SODIUM 100 MG: 100 CAPSULE, LIQUID FILLED ORAL at 09:14

## 2024-09-29 RX ADMIN — DOCUSATE SODIUM 100 MG: 100 CAPSULE, LIQUID FILLED ORAL at 21:00

## 2024-09-29 RX ADMIN — FUROSEMIDE 20 MG: 20 TABLET ORAL at 09:14

## 2024-09-29 RX ADMIN — METOCLOPRAMIDE 10 MG: 10 TABLET ORAL at 21:00

## 2024-09-29 RX ADMIN — METOCLOPRAMIDE 10 MG: 10 TABLET ORAL at 12:01

## 2024-09-29 RX ADMIN — MINERAL OIL 45 ML: 1000 SOLUTION ORAL at 12:26

## 2024-09-29 RX ADMIN — SODIUM CHLORIDE, PRESERVATIVE FREE 10 ML: 5 INJECTION INTRAVENOUS at 09:15

## 2024-09-29 RX ADMIN — ATORVASTATIN CALCIUM 10 MG: 10 TABLET, FILM COATED ORAL at 09:14

## 2024-09-29 RX ADMIN — CLOPIDOGREL BISULFATE 75 MG: 75 TABLET ORAL at 09:14

## 2024-09-29 RX ADMIN — Medication 3 MG: at 21:00

## 2024-09-29 RX ADMIN — ROPINIROLE HYDROCHLORIDE 4 MG: 2 TABLET, FILM COATED ORAL at 21:03

## 2024-09-29 RX ADMIN — DOXYCYCLINE 100 MG: 100 CAPSULE ORAL at 09:14

## 2024-09-29 RX ADMIN — HYDROCORTISONE: 1 CREAM TOPICAL at 11:59

## 2024-09-29 RX ADMIN — ENOXAPARIN SODIUM 40 MG: 100 INJECTION SUBCUTANEOUS at 09:15

## 2024-09-29 RX ADMIN — ONDANSETRON 4 MG: 4 TABLET, ORALLY DISINTEGRATING ORAL at 14:04

## 2024-09-29 RX ADMIN — SODIUM CHLORIDE, PRESERVATIVE FREE 10 ML: 5 INJECTION INTRAVENOUS at 21:00

## 2024-09-29 ASSESSMENT — PAIN SCALES - GENERAL
PAINLEVEL_OUTOF10: 6
PAINLEVEL_OUTOF10: 0
PAINLEVEL_OUTOF10: 0

## 2024-09-29 ASSESSMENT — PAIN DESCRIPTION - DESCRIPTORS: DESCRIPTORS: DISCOMFORT

## 2024-09-29 ASSESSMENT — PAIN DESCRIPTION - LOCATION: LOCATION: RECTUM

## 2024-09-29 NOTE — PROGRESS NOTES
General Surgery Progress Note    Subjective:     Patient without complaints. No nausea or vomiting. Voiding well.  Passing some flatus but no bowel movement yet    Scheduled Meds:   aspirin  81 mg Oral Daily    atorvastatin  10 mg Oral Daily    clopidogrel  75 mg Oral Daily    diclofenac sodium  4 g Topical BID    furosemide  20 mg Oral Daily    insulin glargine  10 Units SubCUTAneous Nightly    midodrine  2.5 mg Oral TID WC    ranolazine  500 mg Oral BID    rOPINIRole  4 mg Oral Nightly    insulin lispro  0-4 Units SubCUTAneous TID WC    insulin lispro  0-4 Units SubCUTAneous Nightly    sodium chloride flush  5-40 mL IntraVENous 2 times per day    enoxaparin  40 mg SubCUTAneous Daily    docusate sodium  100 mg Oral BID    polyethylene glycol  17 g Oral Daily    doxycycline monohydrate  100 mg Oral BID    levofloxacin  750 mg IntraVENous Q24H     Continuous Infusions:   sodium chloride       PRN Meds:ipratropium, sodium chloride flush, sodium chloride, potassium chloride **OR** potassium alternative oral replacement **OR** potassium chloride, magnesium sulfate, ondansetron **OR** ondansetron, melatonin, bisacodyl, acetaminophen **OR** acetaminophen    Objective:   BP (!) 157/95   Pulse 75   Temp 97.8 °F (36.6 °C) (Oral)   Resp 16   Ht 1.448 m (4' 9\")   Wt 66.2 kg (145 lb 15.1 oz)   SpO2 97%   BMI 31.58 kg/m²     I/O last 3 completed shifts:  In: 766.3 [IV Piggyback:766.3]  Out: 2625 [Urine:2625]    Chest: Breath sounds were clear and equal with no rales, wheezes, or rhonchi.     Cardiovascular: Heart sounds were normal with a regular rate and irregular rhythm.      Abdomen:  Bowel sounds were normal.  The abdomen was soft and non distended.      LABS:  Lab Results   Component Value Date/Time    WBC 6.2 09/29/2024 06:51 AM    RBC 3.65 09/29/2024 06:51 AM    RBC 4.16 06/01/2023 08:59 AM    HGB 10.4 09/29/2024 06:51 AM    HCT 31.8 09/29/2024 06:51 AM    MCV 87.3 09/29/2024 06:51 AM    MCH 28.6 09/29/2024 06:51  AM    MCHC 32.8 09/29/2024 06:51 AM    RDW 17.2 09/29/2024 06:51 AM     09/29/2024 06:51 AM    MPV 12.0 09/29/2024 06:51 AM       Lab Results   Component Value Date/Time     09/29/2024 06:51 AM    K 4.3 09/29/2024 06:51 AM     09/29/2024 06:51 AM    CO2 22 09/29/2024 06:51 AM    BUN 19 09/29/2024 06:51 AM    CREATININE 0.8 09/29/2024 06:51 AM    GLUCOSE 117 09/29/2024 06:51 AM    GLUCOSE 168 06/01/2023 08:59 AM    CALCIUM 8.7 09/29/2024 06:51 AM       Assessment/Plan:   Patient with chronic constipation and admitted with urinary tract infection and constipation which she has had for a long period of time  We will repeat rectal examination and possible disimpaction.  Continue oral stool softening agents      Electronically signed by Issac Gonzalez DO  on 9/29/2024 at 8:38 AM

## 2024-09-29 NOTE — PROGRESS NOTES
[facility]   IN-PATIENT SERVICE   [facility]    HISTORY AND PHYSICAL EXAMINATION            Date:   9/29/2024  Patient name:  Komal Crawford  Date of admission:  9/27/2024  4:40 PM  MRN:   [id number]  Account:  [id number]  YOB: 1940  PCP:    Sabiha Bedolla MD  Room:   [id number]  Code Status:    Full Code    Chief Complaint:     Chief Complaint   Patient presents with    Urinary Catheter     Pt had suprapubic catheter placed approx 2 weeks ago, today having hematuria, having abdominal pain, states not feeling well    Hematuria       History Obtained From:     patient, electronic medical record    History of Present Illness:     The patient is a 84 y.o.  Non- / non  female who presents with Urinary Catheter (Pt had suprapubic catheter placed approx 2 weeks ago, today having hematuria, having abdominal pain, states not feeling well) and Hematuria   and she is admitted to the hospital for the management of    Komal Crawford is a 84 y.o. Non- / non  female with a history of CVA, diabetes, coronary artery disease, GERD, rectal prolapse, chronic thrombocytopenia, diabetes mellitus, osteoarthritis, and urogenital implant who presents with Urinary Catheter (Pt had suprapubic catheter placed approx 2 weeks ago, today having hematuria, having abdominal pain, states not feeling well) and Hematuria   and is admitted to the hospital for the management of UTI (urinary tract infection).     According to patient, she has been suffering with a feeling of abdominal fullness, malaise, abdominal pain, and hematuria for the past day.  She reports episode of vomiting 2 days prior.  She also reports urethra and rectal spasms.  She had suprapubic catheter replacement approximately 2 weeks ago due to neurogenic bladder.     Upon presentation to the ER she was hypertensive and mildly hypokalemic.  Her urine was positive for both leukocyte Estrace and nitrites.  Her abdominal CT     RBC 3.65 (L) 4.0 - 5.2 m/uL    Hemoglobin 10.4 (L) 12.0 - 16.0 g/dL    Hematocrit 31.8 (L) 36 - 46 %    MCV 87.3 80 - 100 fL    MCH 28.6 26 - 34 pg    MCHC 32.8 31 - 37 g/dL    RDW 17.2 (H) 11.5 - 14.9 %    Platelets 143 (L) 150 - 450 k/uL    MPV 12.0 6.0 - 12.0 fL    Neutrophils % 63 36 - 66 %    Lymphocytes % 23 (L) 24 - 44 %    Monocytes % 9 (H) 1 - 7 %    Eosinophils % 4 0 - 4 %    Basophils % 1 0 - 2 %    Neutrophils Absolute 3.90 1.3 - 9.1 k/uL    Lymphocytes Absolute 1.40 1.0 - 4.8 k/uL    Monocytes Absolute 0.60 0.1 - 1.3 k/uL    Eosinophils Absolute 0.20 0.0 - 0.4 k/uL    Basophils Absolute 0.10 0.0 - 0.2 k/uL       Imaging/Diagnostics:    Assessment :      Primary Problem  UTI (urinary tract infection)    Active Hospital Problems    Diagnosis Date Noted    UTI (urinary tract infection) [N39.0] 09/27/2024       Plan:     Patient status Admit as inpatient in the  Med/Surge    Patient presented with abdominal pain, nausea, vomiting, CT abdomen concerning for stercoral colitis, has history of constipation and rectal prolapse, underwent D impaction in the emergency room, general surgery following  Has neurogenic bladder, chronic suprapubic catheter, history of recurrent UTIs in the past, UA concerning for UTI started on Levaquin  Patient is chronically on doxycycline with history of infection in left leg after surgery which is resumed  Will discontinue vancomycin for now  PT/OT consult  Urology consulted  Has history of coronary artery disease status post stent, history of stroke in the past on dual antiplatelet and Lipitor  Diabetes, controlled on sliding scale  Lovenox for DVT prophylaxis  9/29 '  Patient, required manual De- impaction of stools by general surgery today morning  Started on Reglan, mineral oil  On Levaquin, culture growing gram-negative rods  Eval to urology,   Changing midodrine with parameters  Likely discharge tomorrow    Consultations:   IP CONSULT TO UROLOGY  IP CONSULT TO GENERAL

## 2024-09-29 NOTE — PLAN OF CARE
Problem: Discharge Planning  Goal: Discharge to home or other facility with appropriate resources  9/28/2024 2127 by Sunny Wilson RN  Outcome: Progressing  Flowsheets (Taken 9/28/2024 2029)  Discharge to home or other facility with appropriate resources:   Identify barriers to discharge with patient and caregiver   Arrange for needed discharge resources and transportation as appropriate   Identify discharge learning needs (meds, wound care, etc)     Problem: Pain  Goal: Verbalizes/displays adequate comfort level or baseline comfort level  9/28/2024 2127 by Sunny Wilson RN  Outcome: Progressing     Problem: Safety - Adult  Goal: Free from fall injury  9/28/2024 2127 by Sunny Wilson RN  Outcome: Progressing     Problem: Skin/Tissue Integrity  Goal: Absence of new skin breakdown  Description: 1.  Monitor for areas of redness and/or skin breakdown  2.  Assess vascular access sites hourly  3.  Every 4-6 hours minimum:  Change oxygen saturation probe site  4.  Every 4-6 hours:  If on nasal continuous positive airway pressure, respiratory therapy assess nares and determine need for appliance change or resting period.  9/28/2024 2127 by Sunny Wilson RN  Outcome: Progressing     Problem: Musculoskeletal - Adult  Goal: Return mobility to safest level of function  9/28/2024 2127 by Sunny Wilson RN  Outcome: Progressing  Flowsheets (Taken 9/28/2024 2029)  Return Mobility to Safest Level of Function:   Assess patient stability and activity tolerance for standing, transferring and ambulating with or without assistive devices   Assist with transfers and ambulation using safe patient handling equipment as needed   Ensure adequate protection for wounds/incisions during mobilization     Problem: Infection - Adult  Goal: Absence of infection at discharge  9/28/2024 2127 by Sunny Wilson RN  Outcome: Progressing  Flowsheets (Taken 9/28/2024 2029)  Absence of infection at discharge:   Assess and monitor for

## 2024-09-29 NOTE — PLAN OF CARE
Problem: Discharge Planning  Goal: Discharge to home or other facility with appropriate resources  Outcome: Progressing     Problem: Pain  Goal: Verbalizes/displays adequate comfort level or baseline comfort level  Outcome: Progressing     Problem: Safety - Adult  Goal: Free from fall injury  Outcome: Progressing  Flowsheets (Taken 9/29/2024 0738)  Free From Fall Injury: Instruct family/caregiver on patient safety     Problem: Skin/Tissue Integrity  Goal: Absence of new skin breakdown  Description: 1.  Monitor for areas of redness and/or skin breakdown  2.  Assess vascular access sites hourly  3.  Every 4-6 hours minimum:  Change oxygen saturation probe site  4.  Every 4-6 hours:  If on nasal continuous positive airway pressure, respiratory therapy assess nares and determine need for appliance change or resting period.  Outcome: Progressing     Problem: ABCDS Injury Assessment  Goal: Absence of physical injury  Outcome: Progressing  Flowsheets (Taken 9/29/2024 0738)  Absence of Physical Injury: Implement safety measures based on patient assessment     Problem: Musculoskeletal - Adult  Goal: Return mobility to safest level of function  Outcome: Progressing  Goal: Return ADL status to a safe level of function  Outcome: Progressing     Problem: Genitourinary - Adult  Goal: Urinary catheter remains patent  Outcome: Progressing     Problem: Infection - Adult  Goal: Absence of infection at discharge  Outcome: Progressing     Problem: Hematologic - Adult  Goal: Maintains hematologic stability  Outcome: Progressing     Problem: Skin/Tissue Integrity - Adult  Goal: Skin integrity remains intact  Outcome: Progressing  Flowsheets (Taken 9/29/2024 0738)  Skin Integrity Remains Intact: Monitor for areas of redness and/or skin breakdown  Goal: Oral mucous membranes remain intact  Outcome: Progressing     Problem: Gastrointestinal - Adult  Goal: Minimal or absence of nausea and vomiting  Outcome: Progressing

## 2024-09-29 NOTE — PROGRESS NOTES
Physical Therapy  Facility/Department: [facility] MED SURG  Physical Therapy Initial Assessment    Name: Komal Crawford  : 1940  MRN: [id number]  Date of Service: 2024    Discharge Recommendations:  Continue to assess pending progress, Patient would benefit from continued therapy after discharge, Therapy recommended at discharge (resume OP PT)   PT Equipment Recommendations  Equipment Needed:  (TBD)      Patient Diagnosis(es): The primary encounter diagnosis was Stercoral colitis. A diagnosis of Acute cystitis with hematuria was also pertinent to this visit.  Past Medical History:  has a past medical history of Acquired absence of both cervix and uterus, Acquired absence of other organs, Age-related cognitive decline, Age-related osteoporosis without current pathological fracture, Ankle pain, Anxiety, Atherosclerotic heart disease of native coronary artery without angina pectoris, B12 deficiency, Winters's esophagus without dysplasia, 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HCC), Body mass index 31.0-31.9, adult, Caffeine use, Cataract extraction status of eye, left, Cataract extraction status of right eye, Cellulitis of left lower limb, Cerebral artery occlusion with cerebral infarction (HCC), Chronic back pain, Chronic ITP (idiopathic thrombocytopenia) (HCC), Constipation, unspecified, CVA (cerebral infarction), Cyst of kidney, acquired, Deficiency of other specified B group vitamins, Diabetic gastroparesis (HCC), Diaphragmatic hernia without obstruction or gangrene, Diverticular disease, Diverticulosis of intestine without perforation or abscess without bleeding, Dorsalgia, unspecified, Essential tremor, Exudative age-related macular degeneration, left eye, with active choroidal neovascularization (HCC), Gastroesophageal reflux disease without esophagitis, GERD (gastroesophageal reflux disease), Hiatal hernia, Hip fracture (HCC), History of blood transfusion, History of decreased platelet count, Hyperlipemia, Hypertension,  increase sitting tolerance to 15 minutes to engage the core muscles  Short Term Goal 6: pt to demonstrate sit <> stand and pivot  transfers using wheeled walker left LE WBAT w/ left hinged knee  brace and right shoe lift w/ supervision  Short Term Goal 7: pt to demonstrate gait 20 - 30'  using wheeled walker left LE WBAT w/ left hinged knee brace and right shoe lift w/ supervision  Patient Goals   Patient Goals : return home w/ spouse       Education  Patient Education  Education Given To: Patient  Education Provided: Role of Therapy;Plan of Care  Education Method: Demonstration;Verbal  Barriers to Learning: None  Education Outcome: Verbalized understanding;Demonstrated understanding      Therapy Time   Individual Concurrent Group Co-treatment   Time In 0936         Time Out 1008         Minutes 32         Timed Code Treatment Minutes: 12 Minutes       Taisha Hudson, PT          Doxycycline Counseling:  Patient counseled regarding possible photosensitivity and increased risk for sunburn.  Patient instructed to avoid sunlight, if possible.  When exposed to sunlight, patients should wear protective clothing, sunglasses, and sunscreen.  The patient was instructed to call the office immediately if the following severe adverse effects occur:  hearing changes, easy bruising/bleeding, severe headache, or vision changes.  The patient verbalized understanding of the proper use and possible adverse effects of doxycycline.  All of the patient's questions and concerns were addressed.

## 2024-09-30 VITALS
DIASTOLIC BLOOD PRESSURE: 72 MMHG | WEIGHT: 145.94 LBS | OXYGEN SATURATION: 100 % | SYSTOLIC BLOOD PRESSURE: 131 MMHG | HEIGHT: 57 IN | HEART RATE: 67 BPM | BODY MASS INDEX: 31.49 KG/M2 | TEMPERATURE: 97.9 F | RESPIRATION RATE: 18 BRPM

## 2024-09-30 LAB
ANION GAP SERPL CALCULATED.3IONS-SCNC: 8 MMOL/L (ref 9–17)
BASOPHILS # BLD: 0.1 K/UL (ref 0–0.2)
BASOPHILS NFR BLD: 1 % (ref 0–2)
BUN SERPL-MCNC: 21 MG/DL (ref 8–23)
CALCIUM SERPL-MCNC: 9 MG/DL (ref 8.6–10.4)
CHLORIDE SERPL-SCNC: 107 MMOL/L (ref 98–107)
CO2 SERPL-SCNC: 24 MMOL/L (ref 20–31)
CREAT SERPL-MCNC: 0.9 MG/DL (ref 0.5–0.9)
EOSINOPHIL # BLD: 0.3 K/UL (ref 0–0.4)
EOSINOPHILS RELATIVE PERCENT: 4 % (ref 0–4)
ERYTHROCYTE [DISTWIDTH] IN BLOOD BY AUTOMATED COUNT: 17.2 % (ref 11.5–14.9)
GFR, ESTIMATED: 63 ML/MIN/1.73M2
GLUCOSE BLD-MCNC: 122 MG/DL (ref 65–105)
GLUCOSE SERPL-MCNC: 112 MG/DL (ref 70–99)
HCT VFR BLD AUTO: 32.4 % (ref 36–46)
HGB BLD-MCNC: 10.6 G/DL (ref 12–16)
LYMPHOCYTES NFR BLD: 1.7 K/UL (ref 1–4.8)
LYMPHOCYTES RELATIVE PERCENT: 28 % (ref 24–44)
MCH RBC QN AUTO: 28.7 PG (ref 26–34)
MCHC RBC AUTO-ENTMCNC: 32.8 G/DL (ref 31–37)
MCV RBC AUTO: 87.5 FL (ref 80–100)
MONOCYTES NFR BLD: 0.6 K/UL (ref 0.1–1.3)
MONOCYTES NFR BLD: 10 % (ref 1–7)
NEUTROPHILS NFR BLD: 57 % (ref 36–66)
NEUTS SEG NFR BLD: 3.4 K/UL (ref 1.3–9.1)
PLATELET # BLD AUTO: 142 K/UL (ref 150–450)
PMV BLD AUTO: 11.7 FL (ref 6–12)
POTASSIUM SERPL-SCNC: 4.7 MMOL/L (ref 3.7–5.3)
RBC # BLD AUTO: 3.7 M/UL (ref 4–5.2)
SODIUM SERPL-SCNC: 139 MMOL/L (ref 135–144)
WBC OTHER # BLD: 6 K/UL (ref 3.5–11)

## 2024-09-30 PROCEDURE — 36415 COLL VENOUS BLD VENIPUNCTURE: CPT

## 2024-09-30 PROCEDURE — 82947 ASSAY GLUCOSE BLOOD QUANT: CPT

## 2024-09-30 PROCEDURE — 6370000000 HC RX 637 (ALT 250 FOR IP)

## 2024-09-30 PROCEDURE — 6370000000 HC RX 637 (ALT 250 FOR IP): Performed by: SURGERY

## 2024-09-30 PROCEDURE — 99239 HOSP IP/OBS DSCHRG MGMT >30: CPT | Performed by: INTERNAL MEDICINE

## 2024-09-30 PROCEDURE — 80048 BASIC METABOLIC PNL TOTAL CA: CPT

## 2024-09-30 PROCEDURE — 85025 COMPLETE CBC W/AUTO DIFF WBC: CPT

## 2024-09-30 PROCEDURE — 6370000000 HC RX 637 (ALT 250 FOR IP): Performed by: INTERNAL MEDICINE

## 2024-09-30 PROCEDURE — 6360000002 HC RX W HCPCS

## 2024-09-30 RX ORDER — POLYETHYLENE GLYCOL 3350 17 G/17G
17 POWDER, FOR SOLUTION ORAL DAILY
Qty: 527 G | Refills: 1 | Status: SHIPPED | OUTPATIENT
Start: 2024-09-30 | End: 2024-10-30

## 2024-09-30 RX ORDER — CIPROFLOXACIN 500 MG/1
500 TABLET, FILM COATED ORAL EVERY 12 HOURS SCHEDULED
Status: DISCONTINUED | OUTPATIENT
Start: 2024-09-30 | End: 2024-09-30 | Stop reason: HOSPADM

## 2024-09-30 RX ORDER — LEVOFLOXACIN 500 MG/1
500 TABLET, FILM COATED ORAL DAILY
Qty: 7 TABLET | Refills: 0 | Status: ON HOLD | OUTPATIENT
Start: 2024-09-30 | End: 2024-10-04 | Stop reason: HOSPADM

## 2024-09-30 RX ORDER — CIPROFLOXACIN 500 MG/1
500 TABLET, FILM COATED ORAL EVERY 12 HOURS SCHEDULED
Qty: 6 TABLET | Refills: 0 | Status: ON HOLD | OUTPATIENT
Start: 2024-09-30 | End: 2024-10-04 | Stop reason: HOSPADM

## 2024-09-30 RX ADMIN — METOCLOPRAMIDE 10 MG: 10 TABLET ORAL at 06:18

## 2024-09-30 RX ADMIN — DOCUSATE SODIUM 100 MG: 100 CAPSULE, LIQUID FILLED ORAL at 07:43

## 2024-09-30 RX ADMIN — RANOLAZINE 500 MG: 500 TABLET, EXTENDED RELEASE ORAL at 07:44

## 2024-09-30 RX ADMIN — DOXYCYCLINE 100 MG: 100 CAPSULE ORAL at 07:44

## 2024-09-30 RX ADMIN — FUROSEMIDE 20 MG: 20 TABLET ORAL at 07:43

## 2024-09-30 RX ADMIN — FAMOTIDINE 20 MG: 20 TABLET, FILM COATED ORAL at 07:44

## 2024-09-30 RX ADMIN — CLOPIDOGREL BISULFATE 75 MG: 75 TABLET ORAL at 07:44

## 2024-09-30 RX ADMIN — ENOXAPARIN SODIUM 40 MG: 100 INJECTION SUBCUTANEOUS at 07:43

## 2024-09-30 RX ADMIN — ATORVASTATIN CALCIUM 10 MG: 10 TABLET, FILM COATED ORAL at 07:43

## 2024-09-30 RX ADMIN — ASPIRIN 81 MG 81 MG: 81 TABLET ORAL at 07:44

## 2024-09-30 RX ADMIN — POLYETHYLENE GLYCOL 3350 17 G: 17 POWDER, FOR SOLUTION ORAL at 07:43

## 2024-09-30 NOTE — PROGRESS NOTES
Patient seen and examined case discussed with general surgeon Dr. Braden Gonzalez at bedside  Ok todc today  Out pt with surg   Urology ok to dc  High fibre diet  Miralax  May need colostomy if this continues  Urinary tract infection with a chronic indwelling suprapubic catheter  Patient received IV Levaquin no side effects discharge and oral Cipro for 3 more days  Sohan Aguilar MD  9/30/2024

## 2024-09-30 NOTE — PROGRESS NOTES
Patient called in confused about her multiple antibiotics. Per Dr Lawson who reviewed culture from 9/27/24 patient placed on levaquin 500mg x 7 days and is to hold both doxyccline and macrobid for that time. Has never started the cipro ordered from [facility] doctor. Once completing she is to go back on macrobid QD and hold on the doxycycline. Patient verified that her allergy to cipro is only that when taking it before she had a few mouth sores to lips that developed, says she took levaquin before in rehab with no reactions that she could recall.

## 2024-09-30 NOTE — PROGRESS NOTES
CLINICAL PHARMACY NOTE: MEDS TO BEDS    Total # of Prescriptions Filled: 1   The following medications were delivered to the patient:  Ciprofloxacin 500mg    Additional Documentation:  Medications picked up kristopher Michel at pharmacy

## 2024-09-30 NOTE — PLAN OF CARE
Problem: Discharge Planning  Goal: Discharge to home or other facility with appropriate resources  9/30/2024 0339 by Yaz Guo RN  Outcome: Progressing  Flowsheets (Taken 9/29/2024 2104)  Discharge to home or other facility with appropriate resources:   Identify barriers to discharge with patient and caregiver   Arrange for needed discharge resources and transportation as appropriate   Identify discharge learning needs (meds, wound care, etc)   Refer to discharge planning if patient needs post-hospital services based on physician order or complex needs related to functional status, cognitive ability or social support system       Problem: Skin/Tissue Integrity  Goal: Absence of new skin breakdown  Description: 1.  Monitor for areas of redness and/or skin breakdown  9/30/2024 0339 by Yaz Guo RN  Outcome: Progressing       Problem: Musculoskeletal - Adult  Goal: Return mobility to safest level of function  9/30/2024 0339 by Yaz Guo, RN  Outcome: Progressing      Goal: Return ADL status to a safe level of function  9/30/2024 0338 by Yaz Guo, RN  Outcome: Progressing  Flowsheets (Taken 9/29/2024 2104)  Return ADL Status to a Safe Level of Function:   Administer medication as ordered   Assess activities of daily living deficits and provide assistive devices as needed   Obtain physical therapy/occupational therapy consults as needed   Assist and instruct patient to increase activity and self care as tolerated    Problem: Infection - Adult  Goal: Absence of infection at discharge  9/30/2024 0339 by Yaz Guo, RN  Outcome: Progressing  Flowsheets (Taken 9/29/2024 2104)  Absence of infection at discharge:   Assess and monitor for signs and symptoms of infection   Instruct and encourage patient and family to use good hand hygiene technique   Administer medications as ordered    Problem: Skin/Tissue Integrity - Adult  Goal: Skin integrity remains intact  9/30/2024 0339 by Sari

## 2024-09-30 NOTE — PROGRESS NOTES
General Surgery Progress Note    Subjective:     Patient without complaints. No nausea or vomiting. Voiding well.  Had substantial bowel movements after digital disimpaction yesterday.  No rectal bleeding noted    Scheduled Meds:   ciprofloxacin  500 mg Oral 2 times per day    hydrocortisone   Topical BID    metoclopramide  10 mg Oral 4x Daily AC & HS    famotidine  20 mg Oral BID    aspirin  81 mg Oral Daily    atorvastatin  10 mg Oral Daily    clopidogrel  75 mg Oral Daily    diclofenac sodium  4 g Topical BID    furosemide  20 mg Oral Daily    insulin glargine  10 Units SubCUTAneous Nightly    ranolazine  500 mg Oral BID    rOPINIRole  4 mg Oral Nightly    insulin lispro  0-4 Units SubCUTAneous TID WC    insulin lispro  0-4 Units SubCUTAneous Nightly    sodium chloride flush  5-40 mL IntraVENous 2 times per day    enoxaparin  40 mg SubCUTAneous Daily    docusate sodium  100 mg Oral BID    polyethylene glycol  17 g Oral Daily    doxycycline monohydrate  100 mg Oral BID     Continuous Infusions:   sodium chloride       PRN Meds:midodrine, ipratropium, sodium chloride flush, sodium chloride, potassium chloride **OR** potassium alternative oral replacement **OR** potassium chloride, magnesium sulfate, ondansetron **OR** ondansetron, melatonin, bisacodyl, acetaminophen **OR** acetaminophen    Objective:   /72   Pulse 67   Temp 97.9 °F (36.6 °C) (Oral)   Resp 18   Ht 1.448 m (4' 9\")   Wt 66.2 kg (145 lb 15.1 oz)   SpO2 100%   BMI 31.58 kg/m²     I/O last 3 completed shifts:  In: 147.1 [IV Piggyback:147.1]  Out: 2900 [Urine:2900]    Chest: Breath sounds were clear and equal with no rales, wheezes, or rhonchi.      Cardiovascular: Heart sounds were normal with a regular rate and rhythm.      Abdomen:  Bowel sounds were normal.  The abdomen was soft and non distended.  There was no tenderness, guarding, rebound, or rigidity.    LABS:  Lab Results   Component Value Date/Time    WBC 6.0 09/30/2024 06:11 AM

## 2024-09-30 NOTE — CARE COORDINATION
DISCHARGE PLANNING NOTE:    NATALIA and discharge med list faxed to [facility].  Reshma with [facility] notified of discharge.    Electronically signed by Belgica Strickland RN on 9/30/2024 at 11:15 AM

## 2024-09-30 NOTE — DISCHARGE INSTR - COC
Continuity of Care Form    Patient Name: Komal Crawford   :  1940  MRN:  [id number]    Admit date:  2024  Discharge date:  24    Code Status Order: Full Code   Advance Directives:   Advance Care Flowsheet Documentation             Admitting Physician:  Bala Olivares MD  PCP: Sabiha Bedolla MD    Discharging Nurse: [facility] Unit[facility]Room[facility] 2043/2043-01  Discharging Unit Phone Number: [phone]    Emergency Contact:   Extended Emergency Contact Information  Primary Emergency Contact: Ritchie Crawford  Address: [address] [address] [place] [address] [facility]  Home Phone: [phone]  Work Phone: [phone]  Mobile Phone: [phone]  Relation: Spouse  Secondary Emergency Contact: Ramila Corcoran  Home Phone: [phone]  Relation: Friend    Past Surgical History:  Past Surgical History:   Procedure Laterality Date    APPENDECTOMY      BLADDER SURGERY      bladder stimulator    BLADDER SUSPENSION      multiple    CARDIAC CATHETERIZATION      no stents    CARDIOVASCULAR STRESS TEST  2014    CATARACT REMOVAL WITH IMPLANT Left 2017    [facility]    CATARACT REMOVAL WITH IMPLANT Right 2017    [facility]    COLON SURGERY      colostomy reversal    COLONOSCOPY  2016    tubular adenoma x3; internal hemorrhoids    COLONOSCOPY N/A 2019    COLONOSCOPY DIAGNOSTIC performed by Eli [facility]elli [facility] at [facility]    COLONOSCOPY  2019    COLOSTOMY  1986    bowel obstruction/ rupture from diverticular disease, reversal 3 mos later    CORONARY ANGIOPLASTY WITH STENT PLACEMENT  08/04/2022    x2    CYSTOSCOPY  2017    W/ 200IU Botox     CYSTOSCOPY N/A 2024    CYSTOSCOPY SUPRAPUBIC TUBE PLACEMENT performed by Omega Lawson MD at [facility] OR    FRACTURE SURGERY Right     hip    FRACTURE SURGERY Left     WRIST    FRACTURE SURGERY Left     ankle    HERNIA REPAIR

## 2024-09-30 NOTE — CARE COORDINATION
ONGOING DISCHARGE PLAN:    Patient is alert and oriented x4.    Spoke with patient regarding discharge plan and patient confirms that plan is still home with [facility].     UTI-discharging on oral Cipro.    Discharge order in.    IMM letter provided to patient.  Patient offered four hours to make informed decision regarding appeal process; patient agreeable to discharge.       Will continue to follow for additional discharge needs.    If patient is discharged prior to next notation, then this note serves as note for discharge by case management.    Electronically signed by Belgica Strickland RN on 9/30/2024 at 11:15 AM

## 2024-09-30 NOTE — CARE COORDINATION
HIP SURGERY Right 11/06/2023    HIP HARDWARE REMOVAL - WITH DEEP HARDWARE REMOVAL 7.3 NABILA SCREW   X3 performed by Jon Bonner MD at [facility] OR    HYSTERECTOMY (CERVIX STATUS UNKNOWN)  1969    JOINT REPLACEMENT Bilateral 2000    BILAT KNEES    KNEE SURGERY Left 03/08/2024    KNEE INCISION AND DRAINAGE WITH WOUND VAC APPLICATION LEFT performed by Damian Keyes MD at [facility] OR    KNEE SURGERY Left 04/02/2024    LEFT KNEE INCISION AND DRAINAGE, BACTISURE WOUND LAVAGE, AND WOUND CLOSURE WITH PREVENA APPLICATION performed by Damian Keyes MD at [facility] OR    LEG MUSCLE SURGERY Left 04/02/2024    REPAIR OF QUADRACEPTS TENDON, performed by Damian Keyes MD at [facility] OR    LUMBAR FUSION  2017    L2/L3    TX XCAPSL CTRC RMVL INSJ IO LENS PROSTH W/O ECP Left 11/07/2017    EYE CATARACT EMULSIFICATION IOL IMPLANT performed by Missy Clements MD at [facility] OR    TX XCAPSL CTRC RMVL INSJ IO LENS PROSTH W/O ECP Right 11/28/2017    EYE CATARACT EMULSIFICATION IOL IMPLANT performed by Missy Clements MD at [facility] OR    REVISION COLOSTOMY  1986    REVISION TOTAL KNEE ARTHROPLASTY Right 10/27/2015    WITH POLY EXCHANGE BIOMET AND GPS APPLICATION    REVISION TOTAL KNEE ARTHROPLASTY Left 07/14/2020    KNEE TOTAL ARTHROPLASTY REVISION - POLY EXCHANGE performed by Damian Keyes MD at [facility] OR    REVISION TOTAL KNEE ARTHROPLASTY Left 01/23/2024    KNEE TOTAL ARTHROPLASTY REVISION WITH OSS performed by Damian Keyes MD at [facility] OR    SPINE SURGERY  2010    fusion, did not take    SPINE SURGERY  1990    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      TONSILLECTOMY  1978    TOTAL HIP ARTHROPLASTY Right 11/06/2023    HIP TOTAL ARTHROPLASTY performed by Jon Bonner MD at [facility] OR    TOTAL HIP ARTHROPLASTY Left 01/20/2024    HIP TOTAL ARTHROPLASTY performed by Jon Bonner MD at [facility] OR    UPPER GASTROINTESTINAL ENDOSCOPY  05/05/2014    UPPER GASTROINTESTINAL ENDOSCOPY  04/07/2016    mild gastritis  RN on 9/30/24 at 11:11 AM EDT    CASE MANAGEMENT/SOCIAL WORK SECTION    Inpatient Status Date: 09/27/24    Readmission Risk Assessment Score:  Readmission Risk              Risk of Unplanned Readmission:  34           Discharging to Facility/ Agency   Name: [facility]  [address]  [place] [address]  Phone [phone]  Fax  [phone]      Dialysis Facility (if applicable)   Name:  Address:  Dialysis Schedule:  Phone:  Fax:    / signature: Electronically signed by Belgica Strickland RN on 9/30/24 at 11:12 AM EDT    PHYSICIAN SECTION    Prognosis: Good    Condition at Discharge: Stable    Rehab Potential (if transferring to Rehab): Good    Recommended Labs or Other Treatments After Discharge:     Physician Certification: I certify the above information and transfer of Komal Crawford  is necessary for the continuing treatment of the diagnosis listed and that she requires Home Care for less 30 days.     Update Admission H&P: No change in H&P    PHYSICIAN SIGNATURE:  Verbal signature per Sohan Aguilar MD, Electronically signed by Belgica Strickland RN on 9/30/24 at 11:12 AM EDT    Medication List    START taking these medications    START taking these medications  ciprofloxacin 500 MG tablet  Commonly known as: CIPRO  Take 1 tablet by mouth every 12 hours for 3 days     CHANGE how you take these medications    CHANGE how you take these medications  polyethylene glycol 17 g packet  Commonly known as: GLYCOLAX  Take 1 packet by mouth daily  What changed:  when to take this  reasons to take this     CONTINUE taking these medications    CONTINUE taking these medications  aspirin 81 MG chewable tablet  Take 1 tablet by mouth daily   atorvastatin 10 MG tablet  Commonly known as: LIPITOR  TAKE ONE (1) TABLET BY MOUTH DAILY   bisacodyl 5 MG EC tablet  Generic drug: bisacodyl  TAKE ONE (1) TABLET BY MOUTH TWO (2) TIMES DAILY AS NEEDED FOR CONSTIPATION   clopidogrel 75 MG

## 2024-09-30 NOTE — PLAN OF CARE
Problem: Musculoskeletal - Adult  Goal: Return mobility to safest level of function  9/30/2024 0806 by Ruth Boone RN  Outcome: Adequate for Discharge  9/30/2024 0339 by Yaz Guo RN  Outcome: Progressing  9/30/2024 0338 by Yaz Guo RN  Outcome: Progressing     Problem: Discharge Planning  Goal: Discharge to home or other facility with appropriate resources  9/30/2024 0806 by Ruth Boone RN  Outcome: Completed  9/30/2024 0339 by Yaz Guo RN  Outcome: Progressing  9/30/2024 0338 by Yaz Guo RN  Outcome: Progressing  Flowsheets (Taken 9/29/2024 2104)  Discharge to home or other facility with appropriate resources:   Identify barriers to discharge with patient and caregiver   Arrange for needed discharge resources and transportation as appropriate   Identify discharge learning needs (meds, wound care, etc)   Refer to discharge planning if patient needs post-hospital services based on physician order or complex needs related to functional status, cognitive ability or social support system     Problem: Pain  Goal: Verbalizes/displays adequate comfort level or baseline comfort level  9/30/2024 0806 by Ruth Boone RN  Outcome: Completed  9/30/2024 0339 by Yaz Guo RN  Outcome: Adequate for Discharge  9/30/2024 0338 by Yaz Guo RN  Outcome: Progressing     Problem: Safety - Adult  Goal: Free from fall injury  9/30/2024 0806 by Ruth Boone RN  Outcome: Completed  9/30/2024 0339 by Yaz Guo RN  Outcome: Adequate for Discharge  9/30/2024 0338 by Yaz Guo RN  Outcome: Progressing     Problem: Skin/Tissue Integrity  Goal: Absence of new skin breakdown  Description: 1.  Monitor for areas of redness and/or skin breakdown  2.  Assess vascular access sites hourly  3.  Every 4-6 hours minimum:  Change oxygen saturation probe site  4.  Every 4-6 hours:  If on nasal continuous positive airway pressure, respiratory therapy assess nares  Adequate for Discharge  9/30/2024 0338 by Yaz Guo RN  Outcome: Progressing  Flowsheets (Taken 9/29/2024 2104)  Maintains hematologic stability: Assess for signs and symptoms of bleeding or hemorrhage     Problem: Skin/Tissue Integrity - Adult  Goal: Skin integrity remains intact  9/30/2024 0806 by Ruth Boone RN  Outcome: Completed  9/30/2024 0339 by Yaz Guo RN  Outcome: Progressing  9/30/2024 0338 by Yaz Guo RN  Outcome: Progressing  Flowsheets (Taken 9/29/2024 2104)  Skin Integrity Remains Intact: Monitor for areas of redness and/or skin breakdown     Problem: Gastrointestinal - Adult  Goal: Minimal or absence of nausea and vomiting  9/30/2024 0806 by Ruth Boone RN  Outcome: Completed  9/30/2024 0339 by Yaz Guo RN  Outcome: Adequate for Discharge  9/30/2024 0338 by Yaz Guo RN  Outcome: Progressing

## 2024-10-01 ENCOUNTER — CARE COORDINATION (OUTPATIENT)
Dept: CARE COORDINATION | Age: 84
End: 2024-10-01

## 2024-10-01 ENCOUNTER — ENROLLMENT (OUTPATIENT)
Dept: CARE COORDINATION | Age: 84
End: 2024-10-01

## 2024-10-01 NOTE — CARE COORDINATION
Care Transitions Note    Initial Call - Call within 2 business days of discharge: Yes    Attempted to reach patient for transitions of care follow up. Unable to reach patient.    Outreach Attempts:   HIPAA compliant voicemail left for patient.     Patient: Komal Crawford    Patient : 1940   MRN: [id number]    Reason for Admission:   Discharge Date: 24  RURS: Readmission Risk Score: 24.2    Last Discharge Facility       Date Complaint Diagnosis Description Type Department Provider    24 Urinary Catheter; Hematuria Stercoral colitis ... ED to Hosp-Admission (Discharged) (ADMITTED) [facility] Bala Watkins MD; Edis Ny...          # 1 attempt-Attempted initial 24 hour hospital follow up call. Left a Hipaa compliant message with name and call back information. Requested return call to [phone].   Was this an external facility discharge? No    Follow Up Appointment:   Patient has hospital follow up appointment scheduled greater than 14 days after discharge; 10/17.    Future Appointments         Provider Specialty Dept Phone    10/2/2024 11:15 AM Bebeto Stokes PTA Physical Therapy [phone]    10/3/2024 9:00 AM Bogdan Guzmán MD Neurology [phone]    10/4/2024 12:00 PM Laura Stauffer PTA Physical Therapy [phone]    10/7/2024 1:40 PM Omega Lawson MD Urology [phone]    10/9/2024 10:30 AM Dandre Harris, PT Physical Therapy [phone]    10/11/2024 11:30 AM Dandre Harris, PT Physical Therapy [phone]    10/17/2024 9:00 AM Sabiha Bedolla MD Primary Care [phone]    2024 10:45 AM David Arteaga MD Wound Ostomy [phone]    2025 10:15 AM Damian Keyes MD Orthopedic Surgery [phone]            Plan for follow-up on next business day.  2nd attempt 24 hr HFU call     An Hawkins RN

## 2024-10-02 ENCOUNTER — APPOINTMENT (OUTPATIENT)
Dept: GENERAL RADIOLOGY | Age: 84
DRG: 563 | End: 2024-10-02
Payer: COMMERCIAL

## 2024-10-02 ENCOUNTER — HOSPITAL ENCOUNTER (OUTPATIENT)
Dept: PHYSICAL THERAPY | Age: 84
Setting detail: THERAPIES SERIES
Discharge: HOME | End: 2024-10-02
Payer: MEDICARE

## 2024-10-02 ENCOUNTER — CARE COORDINATION (OUTPATIENT)
Dept: CARE COORDINATION | Age: 84
End: 2024-10-02

## 2024-10-02 ENCOUNTER — APPOINTMENT (OUTPATIENT)
Dept: CT IMAGING | Age: 84
DRG: 563 | End: 2024-10-02
Payer: COMMERCIAL

## 2024-10-02 ENCOUNTER — HOSPITAL ENCOUNTER (INPATIENT)
Age: 84
LOS: 3 days | Discharge: NURSING, REHAB OR LONG-TERM CARE | DRG: 563 | End: 2024-10-05
Attending: STUDENT IN AN ORGANIZED HEALTH CARE EDUCATION/TRAINING PROGRAM | Admitting: SURGERY
Payer: COMMERCIAL

## 2024-10-02 ENCOUNTER — HOSPITAL ENCOUNTER (EMERGENCY)
Age: 84
Discharge: ANOTHER HOSPITAL | DRG: 563 | End: 2024-10-02
Attending: EMERGENCY MEDICINE
Payer: COMMERCIAL

## 2024-10-02 VITALS
SYSTOLIC BLOOD PRESSURE: 107 MMHG | RESPIRATION RATE: 15 BRPM | BODY MASS INDEX: 31.93 KG/M2 | HEART RATE: 82 BPM | TEMPERATURE: 97.8 F | WEIGHT: 148 LBS | HEIGHT: 57 IN | DIASTOLIC BLOOD PRESSURE: 78 MMHG | OXYGEN SATURATION: 97 %

## 2024-10-02 DIAGNOSIS — S82.842A CLOSED BIMALLEOLAR FRACTURE OF LEFT ANKLE, INITIAL ENCOUNTER: ICD-10-CM

## 2024-10-02 DIAGNOSIS — S72.001A CLOSED DISPLACED FRACTURE OF RIGHT FEMORAL NECK (HCC): ICD-10-CM
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
## 2024-10-02 DIAGNOSIS — S82.842A CLOSED BIMALLEOLAR FRACTURE OF LEFT ANKLE, INITIAL ENCOUNTER: Primary | ICD-10-CM

## 2024-10-02 LAB
25(OH)D3 SERPL-MCNC: 25 NG/ML (ref 30–100)
ABO + RH BLD: NORMAL
ALBUMIN SERPL-MCNC: 3.2 G/DL (ref 3.5–5.2)
ALBUMIN SERPL-MCNC: 3.5 G/DL (ref 3.5–5.2)
ALP SERPL-CCNC: 107 U/L (ref 35–104)
ALT SERPL-CCNC: 13 U/L (ref 5–33)
ANION GAP SERPL CALCULATED.3IONS-SCNC: 12 MMOL/L (ref 9–17)
ANION GAP SERPL CALCULATED.3IONS-SCNC: 9 MMOL/L (ref 9–16)
ARM BAND NUMBER: NORMAL
AST SERPL-CCNC: 16 U/L
BASOPHILS # BLD: 0.1 K/UL (ref 0–0.2)
BASOPHILS NFR BLD: 1 % (ref 0–2)
BILIRUB SERPL-MCNC: 0.3 MG/DL (ref 0.3–1.2)
BLOOD BANK SAMPLE EXPIRATION: NORMAL
BLOOD BANK SPECIMEN: ABNORMAL
BLOOD GROUP ANTIBODIES SERPL: NEGATIVE
BODY TEMPERATURE: 37
BUN SERPL-MCNC: 29 MG/DL (ref 8–23)
BUN SERPL-MCNC: 32 MG/DL (ref 8–23)
CALCIUM SERPL-MCNC: 9.1 MG/DL (ref 8.6–10.4)
CHLORIDE SERPL-SCNC: 105 MMOL/L (ref 98–107)
CHLORIDE SERPL-SCNC: 105 MMOL/L (ref 98–107)
CK SERPL-CCNC: 38 U/L (ref 26–192)
CO2 SERPL-SCNC: 23 MMOL/L (ref 20–31)
CO2 SERPL-SCNC: 24 MMOL/L (ref 20–31)
COHGB MFR BLD: 1.1 % (ref 0–5)
CREAT SERPL-MCNC: 1 MG/DL (ref 0.5–0.9)
CREAT SERPL-MCNC: 1 MG/DL (ref 0.5–0.9)
EOSINOPHIL # BLD: 0.2 K/UL (ref 0–0.4)
EOSINOPHILS RELATIVE PERCENT: 1 % (ref 0–4)
ERYTHROCYTE [DISTWIDTH] IN BLOOD BY AUTOMATED COUNT: 16.4 % (ref 11.8–14.4)
ERYTHROCYTE [DISTWIDTH] IN BLOOD BY AUTOMATED COUNT: 17.2 % (ref 11.5–14.9)
ETHANOL PERCENT: <0.01 %
ETHANOL PERCENT: <0.01 %
ETHANOLAMINE SERPL-MCNC: <10 MG/DL (ref 0–0.08)
ETHANOLAMINE SERPL-MCNC: <10 MG/DL (ref 0–0.08)
FIBRINOGEN, FUNCTIONAL TEG: 36.7 MM (ref 15–32)
FIBRINOGEN, FUNCTIONAL TEG: 37 MM (ref 15–32)
FIO2 ON VENT: ABNORMAL %
FOLATE SERPL-MCNC: 10.5 NG/ML (ref 4.8–24.2)
GFR, ESTIMATED: 56 ML/MIN/1.73M2
GFR, ESTIMATED: 58 ML/MIN/1.73M2
GLUCOSE SERPL-MCNC: 175 MG/DL (ref 74–99)
GLUCOSE SERPL-MCNC: 223 MG/DL (ref 70–99)
HCG SERPL QL: NEGATIVE
HCO3 VENOUS: 24 MMOL/L (ref 24–30)
HCT VFR BLD AUTO: 31.5 % (ref 36.3–47.1)
HCT VFR BLD AUTO: 31.6 % (ref 36–46)
HGB BLD-MCNC: 10.5 G/DL (ref 12–16)
HGB BLD-MCNC: 9.9 G/DL (ref 11.9–15.1)
INR PPP: 1.1
INR PPP: 1.1
LY30 (LYSIS) TEG: 0.6 % (ref 0–2.6)
LY30 (LYSIS) TEG: 2.3 % (ref 0–2.6)
LYMPHOCYTES NFR BLD: 1.3 K/UL (ref 1–4.8)
LYMPHOCYTES RELATIVE PERCENT: 9 % (ref 24–44)
MA(MAX CLOT) RAPID TEG: 69.8 MM (ref 52–70)
MA(MAX CLOT) RAPID TEG: 70.1 MM (ref 52–70)
MCH RBC QN AUTO: 28 PG (ref 25.2–33.5)
MCH RBC QN AUTO: 28.6 PG (ref 26–34)
MCHC RBC AUTO-ENTMCNC: 31.4 G/DL (ref 28.4–34.8)
MCHC RBC AUTO-ENTMCNC: 33.1 G/DL (ref 31–37)
MCV RBC AUTO: 86.2 FL (ref 80–100)
MCV RBC AUTO: 89.2 FL (ref 82.6–102.9)
MONOCYTES NFR BLD: 0.9 K/UL (ref 0.1–1.3)
MONOCYTES NFR BLD: 6 % (ref 1–7)
MYOGLOBIN SERPL-MCNC: 45 NG/ML (ref 25–58)
NEGATIVE BASE EXCESS, VEN: 0.9 MMOL/L (ref 0–2)
NEUTROPHILS NFR BLD: 83 % (ref 36–66)
NEUTS SEG NFR BLD: 11.8 K/UL (ref 1.3–9.1)
NRBC BLD-RTO: 0 PER 100 WBC
O2 SAT, VEN: 24.8 % (ref 60–85)
PARTIAL THROMBOPLASTIN TIME: 26 SEC (ref 24–36)
PARTIAL THROMBOPLASTIN TIME: 27 SEC (ref 23–36.5)
PCO2 VENOUS: 43.5 MM HG (ref 39–55)
PERFORMING LOCATION: ABNORMAL
PH VENOUS: 7.36 (ref 7.32–7.42)
PLATELET # BLD AUTO: 171 K/UL (ref 150–450)
PLATELET # BLD AUTO: ABNORMAL K/UL (ref 138–453)
PLATELET, FLUORESCENCE: 204 K/UL (ref 138–453)
PLATELETS.RETICULATED NFR BLD AUTO: 14.6 % (ref 1.1–10.3)
PMV BLD AUTO: 12 FL (ref 6–12)
PO2 VENOUS: 21.7 MM HG (ref 30–50)
POTASSIUM SERPL-SCNC: 4 MMOL/L (ref 3.7–5.3)
POTASSIUM SERPL-SCNC: 4.2 MMOL/L (ref 3.7–5.3)
PROT SERPL-MCNC: 6.3 G/DL (ref 6.4–8.3)
PROTHROMBIN TIME: 14.5 SEC (ref 11.7–14.9)
PROTHROMBIN TIME: 14.6 SEC (ref 11.8–14.6)
RBC # BLD AUTO: 3.53 M/UL (ref 3.95–5.11)
RBC # BLD AUTO: 3.66 M/UL (ref 4–5.2)
REACTION TIME TEG: 5.6 MIN (ref 4.6–9.1)
REACTION TIME TEG: 6.7 MIN (ref 4.6–9.1)
SODIUM SERPL-SCNC: 138 MMOL/L (ref 136–145)
SODIUM SERPL-SCNC: 140 MMOL/L (ref 135–144)
T4 FREE SERPL-MCNC: 0.9 NG/DL (ref 0.92–1.68)
TROPONIN I SERPL HS-MCNC: 12 NG/L (ref 0–14)
TSH SERPL DL<=0.05 MIU/L-ACNC: 3.07 UIU/ML (ref 0.27–4.2)
VIT B12 SERPL-MCNC: 348 PG/ML (ref 232–1245)
WBC OTHER # BLD: 13.4 K/UL (ref 3.5–11.3)
WBC OTHER # BLD: 14.3 K/UL (ref 3.5–11)

## 2024-10-02 PROCEDURE — 84484 ASSAY OF TROPONIN QUANT: CPT

## 2024-10-02 PROCEDURE — 80051 ELECTROLYTE PANEL: CPT

## 2024-10-02 PROCEDURE — 36415 COLL VENOUS BLD VENIPUNCTURE: CPT

## 2024-10-02 PROCEDURE — 84443 ASSAY THYROID STIM HORMONE: CPT

## 2024-10-02 PROCEDURE — 71250 CT THORAX DX C-: CPT

## 2024-10-02 PROCEDURE — 2500000003 HC RX 250 WO HCPCS

## 2024-10-02 PROCEDURE — 73030 X-RAY EXAM OF SHOULDER: CPT

## 2024-10-02 PROCEDURE — 73590 X-RAY EXAM OF LOWER LEG: CPT

## 2024-10-02 PROCEDURE — 86900 BLOOD TYPING SEROLOGIC ABO: CPT

## 2024-10-02 PROCEDURE — 72125 CT NECK SPINE W/O DYE: CPT

## 2024-10-02 PROCEDURE — 84703 CHORIONIC GONADOTROPIN ASSAY: CPT

## 2024-10-02 PROCEDURE — 97110 THERAPEUTIC EXERCISES: CPT

## 2024-10-02 PROCEDURE — 6360000002 HC RX W HCPCS

## 2024-10-02 PROCEDURE — 85027 COMPLETE CBC AUTOMATED: CPT

## 2024-10-02 PROCEDURE — 84439 ASSAY OF FREE THYROXINE: CPT

## 2024-10-02 PROCEDURE — 73610 X-RAY EXAM OF ANKLE: CPT

## 2024-10-02 PROCEDURE — 85730 THROMBOPLASTIN TIME PARTIAL: CPT

## 2024-10-02 PROCEDURE — 2060000000 HC ICU INTERMEDIATE R&B

## 2024-10-02 PROCEDURE — 83874 ASSAY OF MYOGLOBIN: CPT

## 2024-10-02 PROCEDURE — 82040 ASSAY OF SERUM ALBUMIN: CPT

## 2024-10-02 PROCEDURE — 73630 X-RAY EXAM OF FOOT: CPT

## 2024-10-02 PROCEDURE — 70450 CT HEAD/BRAIN W/O DYE: CPT

## 2024-10-02 PROCEDURE — 82607 VITAMIN B-12: CPT

## 2024-10-02 PROCEDURE — 97116 GAIT TRAINING THERAPY: CPT

## 2024-10-02 PROCEDURE — 86850 RBC ANTIBODY SCREEN: CPT

## 2024-10-02 PROCEDURE — 96374 THER/PROPH/DIAG INJ IV PUSH: CPT

## 2024-10-02 PROCEDURE — 83036 HEMOGLOBIN GLYCOSYLATED A1C: CPT

## 2024-10-02 PROCEDURE — 6810039001 HC L1 TRAUMA PRIORITY

## 2024-10-02 PROCEDURE — 85025 COMPLETE CBC W/AUTO DIFF WBC: CPT

## 2024-10-02 PROCEDURE — 84520 ASSAY OF UREA NITROGEN: CPT

## 2024-10-02 PROCEDURE — 6360000002 HC RX W HCPCS: Performed by: EMERGENCY MEDICINE

## 2024-10-02 PROCEDURE — 85610 PROTHROMBIN TIME: CPT

## 2024-10-02 PROCEDURE — 85347 COAGULATION TIME ACTIVATED: CPT

## 2024-10-02 PROCEDURE — G0480 DRUG TEST DEF 1-7 CLASSES: HCPCS

## 2024-10-02 PROCEDURE — 85390 FIBRINOLYSINS SCREEN I&R: CPT

## 2024-10-02 PROCEDURE — 73502 X-RAY EXAM HIP UNI 2-3 VIEWS: CPT

## 2024-10-02 PROCEDURE — 82306 VITAMIN D 25 HYDROXY: CPT

## 2024-10-02 PROCEDURE — 82746 ASSAY OF FOLIC ACID SERUM: CPT

## 2024-10-02 PROCEDURE — 80053 COMPREHEN METABOLIC PANEL: CPT

## 2024-10-02 PROCEDURE — 99222 1ST HOSP IP/OBS MODERATE 55: CPT | Performed by: SURGERY

## 2024-10-02 PROCEDURE — 82947 ASSAY GLUCOSE BLOOD QUANT: CPT

## 2024-10-02 PROCEDURE — 86901 BLOOD TYPING SEROLOGIC RH(D): CPT

## 2024-10-02 PROCEDURE — 85576 BLOOD PLATELET AGGREGATION: CPT

## 2024-10-02 PROCEDURE — 82805 BLOOD GASES W/O2 SATURATION: CPT

## 2024-10-02 PROCEDURE — 29515 APPLICATION SHORT LEG SPLINT: CPT

## 2024-10-02 PROCEDURE — 82565 ASSAY OF CREATININE: CPT

## 2024-10-02 PROCEDURE — 99285 EMERGENCY DEPT VISIT HI MDM: CPT

## 2024-10-02 PROCEDURE — 74176 CT ABD & PELVIS W/O CONTRAST: CPT

## 2024-10-02 PROCEDURE — 85384 FIBRINOGEN ACTIVITY: CPT

## 2024-10-02 PROCEDURE — 82550 ASSAY OF CK (CPK): CPT

## 2024-10-02 RX ORDER — LIDOCAINE HYDROCHLORIDE 10 MG/ML
20 INJECTION, SOLUTION INFILTRATION; PERINEURAL ONCE
Status: COMPLETED | OUTPATIENT
Start: 2024-10-02 | End: 2024-10-02

## 2024-10-02 RX ORDER — FENTANYL CITRATE 0.05 MG/ML
50 INJECTION, SOLUTION INTRAMUSCULAR; INTRAVENOUS ONCE
Status: COMPLETED | OUTPATIENT
Start: 2024-10-02 | End: 2024-10-02

## 2024-10-02 RX ORDER — FENTANYL CITRATE 50 UG/ML
25 INJECTION, SOLUTION INTRAMUSCULAR; INTRAVENOUS ONCE
Status: COMPLETED | OUTPATIENT
Start: 2024-10-02 | End: 2024-10-02

## 2024-10-02 RX ORDER — FENTANYL CITRATE 50 UG/ML
INJECTION, SOLUTION INTRAMUSCULAR; INTRAVENOUS
Status: COMPLETED
Start: 2024-10-02 | End: 2024-10-02

## 2024-10-02 RX ADMIN — FENTANYL CITRATE 25 MCG: 50 INJECTION, SOLUTION INTRAMUSCULAR; INTRAVENOUS at 22:24

## 2024-10-02 RX ADMIN — LIDOCAINE HYDROCHLORIDE 20 ML: 10 INJECTION, SOLUTION INFILTRATION; PERINEURAL at 23:51

## 2024-10-02 RX ADMIN — FENTANYL CITRATE 50 MCG: 0.05 INJECTION, SOLUTION INTRAMUSCULAR; INTRAVENOUS at 18:11

## 2024-10-02 ASSESSMENT — PAIN SCALES - GENERAL
PAINLEVEL_OUTOF10: 10
PAINLEVEL_OUTOF10: 8

## 2024-10-02 ASSESSMENT — ENCOUNTER SYMPTOMS
CHEST TIGHTNESS: 0
WHEEZING: 0
RHINORRHEA: 0
VOMITING: 0
DIARRHEA: 0
ABDOMINAL PAIN: 0
SORE THROAT: 0
EYE PAIN: 0
BACK PAIN: 0
EYE DISCHARGE: 0
EYE REDNESS: 0
TROUBLE SWALLOWING: 0
SHORTNESS OF BREATH: 0
TROUBLE SWALLOWING: 0
SHORTNESS OF BREATH: 0
COLOR CHANGE: 0
FACIAL SWELLING: 0
ABDOMINAL PAIN: 0
SINUS PRESSURE: 0
CONSTIPATION: 0
NAUSEA: 0
BLOOD IN STOOL: 0
COUGH: 0
BACK PAIN: 0
NAUSEA: 0

## 2024-10-02 ASSESSMENT — PAIN DESCRIPTION - LOCATION: LOCATION: ANKLE;CHEST

## 2024-10-02 ASSESSMENT — PAIN - FUNCTIONAL ASSESSMENT
PAIN_FUNCTIONAL_ASSESSMENT: 0-10
PAIN_FUNCTIONAL_ASSESSMENT: 0-10

## 2024-10-02 NOTE — ED NOTES
OK per Dr. Ortega to give pt apple juice. Pt head raised and her phone given to her. Pt denies any further needs at this time.

## 2024-10-02 NOTE — FLOWSHEET NOTE
[facility]   Outpatient Rehabilitation & Therapy  [address]  P: [phone]   F: [phone]    Physical Therapy Daily Treatment Note    Date:  10/2/2024  Patient Name:  Komal Crawford    :  1940  MRN: [id number]  Physician: Damian Keyes MD                                     Insurance: Medicare - follow Medicare guidelines No hard max [employer] - based on medical necessity No hard max  Medical Diagnosis: (L) knee quad rupture (76.112A), History of total knee arthroplasty, left (Z96.652)  Clinical Diagnosis: (L) knee pain(M25.562) with decreased range of motion(M21.262) and muscle weakness (M62.81)   Onset date: 2024                     Next 's appt.: 25  Visit Count:                                 Cancel/No Show: 3/3    Subjective:   Pt reported had missed visits due to hospitalization for bladder and bowel infection.  Infection caused significant weakness and needing more assistance from .  Reported weakness with walking using walker.      Pain:  [] Yes  [x] No Location: L Knee   Pain Rating: (0-10 scale)   Currently: 0/10  At Best: 0/10  At Worst: 0/10  Pain altered Tx:  [] No  [] Yes  Action:  Comments:     Objective: Walker used for transfers   Modalities: Vasocompression to (L) knee, 40 degrees, low pressure - 10' (Held)   Precautions: cognitive decline, osteoporosis, anxiety, CVA - short term memory loss, macular degeneration of (L) eye, major depressive disorder, neuropathy, DM and unsteadiness on feet   Exercises:  Exercise Reps/ Time Weight/ Level Completed  Today Comments   Sit to stands  6x       Gait Training  100'      Semi-Reclined (L) knee heel slides  15 reps   x    Therapist assisted hip flexion  10 reps x 2 sets   x    Therapist assisted supine hip abduction  10 reps x 2 sets       Quad Sets 10 reps x 2 sets, 5\" hold  x    Hamstring Set 10 reps x 5\" hold  x    Glute Sets  10 reps x 5\" hold  x    Therapist Assisted Supine SAQ 10x2  x    AAROM
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
## 2024-10-02 NOTE — CARE COORDINATION
Care Transitions Note    Initial Call - Call within 2 business days of discharge: Yes    Attempted to reach patient for transitions of care follow up. Unable to reach patient.    Outreach Attempts:   Multiple attempts to contact patient at phone numbers on file.     Patient: Komal Crawford    Patient : 1940   MRN: [id number]    Reason for Admission:   Discharge Date: 24  RURS: Readmission Risk Score: 24.2    Last Discharge Facility       Date Complaint Diagnosis Description Type Department Provider    24 Urinary Catheter; Hematuria Stercoral colitis ... ED to Hosp-Admission (Discharged) (ADMITTED) [facility] Bala Watkins MD; Edis Ny...          # 2 attempt-Attempted initial 24 hour hospital follow up call. Left a Hipaa compliant message with name and call back information. Requested return call to [phone].   2 unsuccessful attempts to reach patient, care transition episode resolved will send back to JACQUES hurtado for CC//JU      Was this an external facility discharge? No    Follow Up Appointment:   Patient has hospital follow up appointment scheduled  10/17/24 with Dr. Bedolla     Future Appointments         Provider Specialty Dept Phone    10/2/2024 11:15 AM Bebeto Stokes PTA Physical Therapy [phone]    10/3/2024 9:00 AM Bogdan Guzmán MD Neurology [phone]    10/4/2024 12:00 PM Laura Stauffer PTA Physical Therapy [phone]    10/7/2024 1:40 PM Omega Lawson MD Urology [phone]    10/9/2024 10:30 AM Dandre Harris, PT Physical Therapy [phone]    10/11/2024 11:30 AM Dandre Harris, PT Physical Therapy [phone]    10/17/2024 9:00 AM Sabiha Bedolla MD Primary Care [phone]    2024 10:45 AM David Arteaga MD Wound Ostomy [phone]    2025 10:15 AM Damian Keyes MD Orthopedic Surgery [phone]            Patient referred back to JESUS Driver for continued management.     An Hawkins, LETY
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
## 2024-10-02 NOTE — CONSULTS
Spoke with Dr. Ortega about patient's right ankle fracture.  Patient will be transferred to [facility] for evaluation.  Orthopedics will see and evaluate with likely surgical intervention 10/3/2024.  Please consult orthopedics on-call resident once patient presents for evaluation.    Barry Johnson,   5:55 PM 10/2/2024

## 2024-10-03 ENCOUNTER — APPOINTMENT (OUTPATIENT)
Dept: GENERAL RADIOLOGY | Age: 84
DRG: 563 | End: 2024-10-03
Payer: COMMERCIAL

## 2024-10-03 ENCOUNTER — APPOINTMENT (OUTPATIENT)
Dept: CT IMAGING | Age: 84
DRG: 563 | End: 2024-10-03
Payer: COMMERCIAL

## 2024-10-03 ENCOUNTER — TELEPHONE (OUTPATIENT)
Age: 84
End: 2024-10-03

## 2024-10-03 PROBLEM — E44.0 MODERATE MALNUTRITION (HCC): Status: ACTIVE | Noted: 2024-10-03
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
## 2024-10-03 PROBLEM — Z01.818 PRE-OP EVALUATION: Status: ACTIVE | Noted: 2024-10-03

## 2024-10-03 LAB
ANION GAP SERPL CALCULATED.3IONS-SCNC: 12 MMOL/L (ref 9–16)
BACTERIA URNS QL MICRO: ABNORMAL
BASOPHILS # BLD: 0.09 K/UL (ref 0–0.2)
BASOPHILS NFR BLD: 1 % (ref 0–2)
BILIRUB UR QL STRIP: NEGATIVE
BUN SERPL-MCNC: 27 MG/DL (ref 8–23)
CALCIUM SERPL-MCNC: 9.2 MG/DL (ref 8.6–10.4)
CASTS #/AREA URNS LPF: ABNORMAL /LPF (ref 0–8)
CHLORIDE SERPL-SCNC: 105 MMOL/L (ref 98–107)
CLARITY UR: ABNORMAL
CO2 SERPL-SCNC: 21 MMOL/L (ref 20–31)
COLOR UR: YELLOW
CREAT SERPL-MCNC: 0.9 MG/DL (ref 0.5–0.9)
EOSINOPHIL # BLD: 0.03 K/UL (ref 0–0.44)
EOSINOPHILS RELATIVE PERCENT: 0 % (ref 1–4)
EPI CELLS #/AREA URNS HPF: ABNORMAL /HPF (ref 0–5)
ERYTHROCYTE [DISTWIDTH] IN BLOOD BY AUTOMATED COUNT: 16.7 % (ref 11.8–14.4)
EST. AVERAGE GLUCOSE BLD GHB EST-MCNC: 143 MG/DL
GFR, ESTIMATED: 66 ML/MIN/1.73M2
GLUCOSE BLD-MCNC: 156 MG/DL (ref 65–105)
GLUCOSE BLD-MCNC: 168 MG/DL (ref 65–105)
GLUCOSE BLD-MCNC: 175 MG/DL (ref 65–105)
GLUCOSE BLD-MCNC: 191 MG/DL (ref 65–105)
GLUCOSE BLD-MCNC: 281 MG/DL (ref 65–105)
GLUCOSE SERPL-MCNC: 202 MG/DL (ref 74–99)
GLUCOSE UR STRIP-MCNC: ABNORMAL MG/DL
HBA1C MFR BLD: 6.6 % (ref 4–6)
HCT VFR BLD AUTO: 33.8 % (ref 36.3–47.1)
HGB BLD-MCNC: 10.2 G/DL (ref 11.9–15.1)
HGB UR QL STRIP.AUTO: ABNORMAL
IMM GRANULOCYTES # BLD AUTO: 0.09 K/UL (ref 0–0.3)
IMM GRANULOCYTES NFR BLD: 1 %
KETONES UR STRIP-MCNC: ABNORMAL MG/DL
LEUKOCYTE ESTERASE UR QL STRIP: ABNORMAL
LYMPHOCYTES NFR BLD: 1.09 K/UL (ref 1.1–3.7)
LYMPHOCYTES RELATIVE PERCENT: 10 % (ref 24–43)
MAGNESIUM SERPL-MCNC: 1.9 MG/DL (ref 1.6–2.4)
MCH RBC QN AUTO: 28 PG (ref 25.2–33.5)
MCHC RBC AUTO-ENTMCNC: 30.2 G/DL (ref 28.4–34.8)
MCV RBC AUTO: 92.9 FL (ref 82.6–102.9)
MONOCYTES NFR BLD: 0.79 K/UL (ref 0.1–1.2)
MONOCYTES NFR BLD: 7 % (ref 3–12)
NEUTROPHILS NFR BLD: 82 % (ref 36–65)
NEUTS SEG NFR BLD: 9.39 K/UL (ref 1.5–8.1)
NITRITE UR QL STRIP: NEGATIVE
NRBC BLD-RTO: 0 PER 100 WBC
PH UR STRIP: 5.5 [PH] (ref 5–8)
PLATELET # BLD AUTO: ABNORMAL K/UL (ref 138–453)
PLATELET, FLUORESCENCE: 166 K/UL (ref 138–453)
PLATELETS.RETICULATED NFR BLD AUTO: 14.9 % (ref 1.1–10.3)
POTASSIUM SERPL-SCNC: 4.5 MMOL/L (ref 3.7–5.3)
PROT UR STRIP-MCNC: ABNORMAL MG/DL
RBC # BLD AUTO: 3.64 M/UL (ref 3.95–5.11)
RBC # BLD: ABNORMAL 10*6/UL
RBC #/AREA URNS HPF: ABNORMAL /HPF (ref 0–4)
SODIUM SERPL-SCNC: 138 MMOL/L (ref 136–145)
SP GR UR STRIP: 1.02 (ref 1–1.03)
UROBILINOGEN UR STRIP-ACNC: NORMAL EU/DL (ref 0–1)
WBC #/AREA URNS HPF: ABNORMAL /HPF (ref 0–5)
WBC OTHER # BLD: 11.5 K/UL (ref 3.5–11.3)
YEAST URNS QL MICRO: ABNORMAL

## 2024-10-03 PROCEDURE — 73562 X-RAY EXAM OF KNEE 3: CPT

## 2024-10-03 PROCEDURE — 6370000000 HC RX 637 (ALT 250 FOR IP): Performed by: STUDENT IN AN ORGANIZED HEALTH CARE EDUCATION/TRAINING PROGRAM

## 2024-10-03 PROCEDURE — 92523 SPEECH SOUND LANG COMPREHEN: CPT

## 2024-10-03 PROCEDURE — 2580000003 HC RX 258: Performed by: STUDENT IN AN ORGANIZED HEALTH CARE EDUCATION/TRAINING PROGRAM

## 2024-10-03 PROCEDURE — 87086 URINE CULTURE/COLONY COUNT: CPT

## 2024-10-03 PROCEDURE — 81001 URINALYSIS AUTO W/SCOPE: CPT

## 2024-10-03 PROCEDURE — 83735 ASSAY OF MAGNESIUM: CPT

## 2024-10-03 PROCEDURE — 6370000000 HC RX 637 (ALT 250 FOR IP)

## 2024-10-03 PROCEDURE — 85025 COMPLETE CBC W/AUTO DIFF WBC: CPT

## 2024-10-03 PROCEDURE — 99222 1ST HOSP IP/OBS MODERATE 55: CPT | Performed by: INTERNAL MEDICINE

## 2024-10-03 PROCEDURE — 73130 X-RAY EXAM OF HAND: CPT

## 2024-10-03 PROCEDURE — 85055 RETICULATED PLATELET ASSAY: CPT

## 2024-10-03 PROCEDURE — 2060000000 HC ICU INTERMEDIATE R&B

## 2024-10-03 PROCEDURE — 73070 X-RAY EXAM OF ELBOW: CPT

## 2024-10-03 PROCEDURE — 73700 CT LOWER EXTREMITY W/O DYE: CPT

## 2024-10-03 PROCEDURE — 82947 ASSAY GLUCOSE BLOOD QUANT: CPT

## 2024-10-03 PROCEDURE — 6360000002 HC RX W HCPCS

## 2024-10-03 PROCEDURE — 36415 COLL VENOUS BLD VENIPUNCTURE: CPT

## 2024-10-03 PROCEDURE — 80048 BASIC METABOLIC PNL TOTAL CA: CPT

## 2024-10-03 PROCEDURE — 2580000003 HC RX 258

## 2024-10-03 PROCEDURE — 99232 SBSQ HOSP IP/OBS MODERATE 35: CPT | Performed by: SURGERY

## 2024-10-03 PROCEDURE — 99223 1ST HOSP IP/OBS HIGH 75: CPT | Performed by: STUDENT IN AN ORGANIZED HEALTH CARE EDUCATION/TRAINING PROGRAM

## 2024-10-03 PROCEDURE — 96127 BRIEF EMOTIONAL/BEHAV ASSMT: CPT | Performed by: SOCIAL WORKER

## 2024-10-03 RX ORDER — ONDANSETRON 2 MG/ML
4 INJECTION INTRAMUSCULAR; INTRAVENOUS EVERY 6 HOURS PRN
Status: DISCONTINUED | OUTPATIENT
Start: 2024-10-03 | End: 2024-10-05 | Stop reason: HOSPADM

## 2024-10-03 RX ORDER — SODIUM CHLORIDE, SODIUM LACTATE, POTASSIUM CHLORIDE, CALCIUM CHLORIDE 600; 310; 30; 20 MG/100ML; MG/100ML; MG/100ML; MG/100ML
INJECTION, SOLUTION INTRAVENOUS CONTINUOUS
Status: DISCONTINUED | OUTPATIENT
Start: 2024-10-03 | End: 2024-10-03

## 2024-10-03 RX ORDER — FUROSEMIDE 20 MG
20 TABLET ORAL DAILY
Status: DISCONTINUED | OUTPATIENT
Start: 2024-10-03 | End: 2024-10-05 | Stop reason: HOSPADM

## 2024-10-03 RX ORDER — ERGOCALCIFEROL 1.25 MG/1
50000 CAPSULE, LIQUID FILLED ORAL WEEKLY
Qty: 12 CAPSULE | Refills: 1 | Status: SHIPPED | OUTPATIENT
Start: 2024-10-03

## 2024-10-03 RX ORDER — ATORVASTATIN CALCIUM 10 MG/1
10 TABLET, FILM COATED ORAL DAILY
Status: DISCONTINUED | OUTPATIENT
Start: 2024-10-03 | End: 2024-10-05 | Stop reason: HOSPADM

## 2024-10-03 RX ORDER — SODIUM CHLORIDE 0.9 % (FLUSH) 0.9 %
5-40 SYRINGE (ML) INJECTION EVERY 12 HOURS SCHEDULED
Status: DISCONTINUED | OUTPATIENT
Start: 2024-10-03 | End: 2024-10-05 | Stop reason: HOSPADM

## 2024-10-03 RX ORDER — INSULIN LISPRO 100 [IU]/ML
0-16 INJECTION, SOLUTION INTRAVENOUS; SUBCUTANEOUS
Status: DISCONTINUED | OUTPATIENT
Start: 2024-10-04 | End: 2024-10-05 | Stop reason: HOSPADM

## 2024-10-03 RX ORDER — VANCOMYCIN HYDROCHLORIDE 500 MG/10ML
500 INJECTION, POWDER, LYOPHILIZED, FOR SOLUTION INTRAVENOUS ONCE
Status: DISCONTINUED | OUTPATIENT
Start: 2024-10-03 | End: 2024-10-03

## 2024-10-03 RX ORDER — SENNOSIDES A AND B 8.6 MG/1
1 TABLET, FILM COATED ORAL NIGHTLY
Status: DISCONTINUED | OUTPATIENT
Start: 2024-10-03 | End: 2024-10-05 | Stop reason: HOSPADM

## 2024-10-03 RX ORDER — SODIUM CHLORIDE 9 MG/ML
INJECTION, SOLUTION INTRAVENOUS PRN
Status: DISCONTINUED | OUTPATIENT
Start: 2024-10-03 | End: 2024-10-05 | Stop reason: HOSPADM

## 2024-10-03 RX ORDER — ROPINIROLE 1 MG/1
4 TABLET, FILM COATED ORAL NIGHTLY
Status: DISCONTINUED | OUTPATIENT
Start: 2024-10-03 | End: 2024-10-05 | Stop reason: HOSPADM

## 2024-10-03 RX ORDER — MAGNESIUM SULFATE IN WATER 40 MG/ML
2000 INJECTION, SOLUTION INTRAVENOUS PRN
Status: DISCONTINUED | OUTPATIENT
Start: 2024-10-03 | End: 2024-10-03

## 2024-10-03 RX ORDER — POTASSIUM CHLORIDE 7.45 MG/ML
10 INJECTION INTRAVENOUS PRN
Status: DISCONTINUED | OUTPATIENT
Start: 2024-10-03 | End: 2024-10-03

## 2024-10-03 RX ORDER — GLUCAGON 1 MG/ML
1 KIT INJECTION PRN
Status: DISCONTINUED | OUTPATIENT
Start: 2024-10-03 | End: 2024-10-05 | Stop reason: HOSPADM

## 2024-10-03 RX ORDER — ACETAMINOPHEN 500 MG
1000 TABLET ORAL EVERY 8 HOURS SCHEDULED
Status: DISCONTINUED | OUTPATIENT
Start: 2024-10-03 | End: 2024-10-05 | Stop reason: HOSPADM

## 2024-10-03 RX ORDER — PANTOPRAZOLE SODIUM 40 MG/1
40 TABLET, DELAYED RELEASE ORAL
Status: DISCONTINUED | OUTPATIENT
Start: 2024-10-03 | End: 2024-10-05 | Stop reason: HOSPADM

## 2024-10-03 RX ORDER — DEXTROSE MONOHYDRATE 100 MG/ML
INJECTION, SOLUTION INTRAVENOUS CONTINUOUS PRN
Status: DISCONTINUED | OUTPATIENT
Start: 2024-10-03 | End: 2024-10-05 | Stop reason: HOSPADM

## 2024-10-03 RX ORDER — SODIUM CHLORIDE 0.9 % (FLUSH) 0.9 %
5-40 SYRINGE (ML) INJECTION PRN
Status: DISCONTINUED | OUTPATIENT
Start: 2024-10-03 | End: 2024-10-05 | Stop reason: HOSPADM

## 2024-10-03 RX ORDER — INSULIN GLARGINE 100 [IU]/ML
10 INJECTION, SOLUTION SUBCUTANEOUS NIGHTLY
Status: DISCONTINUED | OUTPATIENT
Start: 2024-10-03 | End: 2024-10-05 | Stop reason: HOSPADM

## 2024-10-03 RX ORDER — POLYETHYLENE GLYCOL 3350 17 G/17G
17 POWDER, FOR SOLUTION ORAL DAILY
Status: DISCONTINUED | OUTPATIENT
Start: 2024-10-03 | End: 2024-10-05 | Stop reason: HOSPADM

## 2024-10-03 RX ORDER — GINSENG 100 MG
CAPSULE ORAL 3 TIMES DAILY
Status: DISCONTINUED | OUTPATIENT
Start: 2024-10-03 | End: 2024-10-05 | Stop reason: HOSPADM

## 2024-10-03 RX ORDER — ENOXAPARIN SODIUM 100 MG/ML
30 INJECTION SUBCUTANEOUS 2 TIMES DAILY
Status: DISCONTINUED | OUTPATIENT
Start: 2024-10-04 | End: 2024-10-05 | Stop reason: HOSPADM

## 2024-10-03 RX ORDER — RANOLAZINE 500 MG/1
500 TABLET, EXTENDED RELEASE ORAL 2 TIMES DAILY
Status: DISCONTINUED | OUTPATIENT
Start: 2024-10-03 | End: 2024-10-05 | Stop reason: HOSPADM

## 2024-10-03 RX ORDER — POTASSIUM CHLORIDE 29.8 MG/ML
20 INJECTION INTRAVENOUS PRN
Status: DISCONTINUED | OUTPATIENT
Start: 2024-10-03 | End: 2024-10-03

## 2024-10-03 RX ORDER — INSULIN LISPRO 100 [IU]/ML
0-16 INJECTION, SOLUTION INTRAVENOUS; SUBCUTANEOUS EVERY 4 HOURS
Status: DISCONTINUED | OUTPATIENT
Start: 2024-10-03 | End: 2024-10-03

## 2024-10-03 RX ORDER — OXYCODONE HYDROCHLORIDE 5 MG/1
5 TABLET ORAL EVERY 6 HOURS PRN
Status: DISCONTINUED | OUTPATIENT
Start: 2024-10-03 | End: 2024-10-05 | Stop reason: HOSPADM

## 2024-10-03 RX ORDER — ONDANSETRON 4 MG/1
4 TABLET, ORALLY DISINTEGRATING ORAL EVERY 8 HOURS PRN
Status: DISCONTINUED | OUTPATIENT
Start: 2024-10-03 | End: 2024-10-05 | Stop reason: HOSPADM

## 2024-10-03 RX ADMIN — BACITRACIN: 500 OINTMENT TOPICAL at 14:30

## 2024-10-03 RX ADMIN — RANOLAZINE 500 MG: 500 TABLET, FILM COATED, EXTENDED RELEASE ORAL at 22:10

## 2024-10-03 RX ADMIN — BACITRACIN: 500 OINTMENT TOPICAL at 10:35

## 2024-10-03 RX ADMIN — SENNOSIDES 8.6 MG: 8.6 TABLET, COATED ORAL at 22:10

## 2024-10-03 RX ADMIN — ACETAMINOPHEN 1000 MG: 500 TABLET ORAL at 22:10

## 2024-10-03 RX ADMIN — ACETAMINOPHEN 1000 MG: 500 TABLET ORAL at 14:29

## 2024-10-03 RX ADMIN — OXYCODONE 5 MG: 5 TABLET ORAL at 01:55

## 2024-10-03 RX ADMIN — ROPINIROLE HYDROCHLORIDE 4 MG: 1 TABLET, FILM COATED ORAL at 22:52

## 2024-10-03 RX ADMIN — VANCOMYCIN HYDROCHLORIDE 1000 MG: 1 INJECTION, POWDER, LYOPHILIZED, FOR SOLUTION INTRAVENOUS at 09:15

## 2024-10-03 RX ADMIN — ATORVASTATIN CALCIUM 10 MG: 10 TABLET, FILM COATED ORAL at 09:03

## 2024-10-03 RX ADMIN — SODIUM CHLORIDE, POTASSIUM CHLORIDE, SODIUM LACTATE AND CALCIUM CHLORIDE: 600; 310; 30; 20 INJECTION, SOLUTION INTRAVENOUS at 01:48

## 2024-10-03 RX ADMIN — ROPINIROLE HYDROCHLORIDE 4 MG: 1 TABLET, FILM COATED ORAL at 01:55

## 2024-10-03 RX ADMIN — INSULIN LISPRO 8 UNITS: 100 INJECTION, SOLUTION INTRAVENOUS; SUBCUTANEOUS at 17:39

## 2024-10-03 RX ADMIN — BACITRACIN: 500 OINTMENT TOPICAL at 22:14

## 2024-10-03 RX ADMIN — OXYCODONE 5 MG: 5 TABLET ORAL at 22:10

## 2024-10-03 RX ADMIN — RANOLAZINE 500 MG: 500 TABLET, FILM COATED, EXTENDED RELEASE ORAL at 09:03

## 2024-10-03 ASSESSMENT — ENCOUNTER SYMPTOMS
ABDOMINAL PAIN: 0
VOMITING: 0
NAUSEA: 0
DIARRHEA: 0
SHORTNESS OF BREATH: 0
COUGH: 0
COLOR CHANGE: 1
BACK PAIN: 0

## 2024-10-03 ASSESSMENT — PAIN DESCRIPTION - LOCATION
LOCATION: CHEST;LEG
LOCATION: FOOT

## 2024-10-03 ASSESSMENT — PAIN SCALES - GENERAL
PAINLEVEL_OUTOF10: 10
PAINLEVEL_OUTOF10: 10
PAINLEVEL_OUTOF10: 6
PAINLEVEL_OUTOF10: 7
PAINLEVEL_OUTOF10: 0
PAINLEVEL_OUTOF10: 8

## 2024-10-03 ASSESSMENT — PAIN DESCRIPTION - DESCRIPTORS
DESCRIPTORS: DISCOMFORT
DESCRIPTORS: ACHING
DESCRIPTORS: ACHING;THROBBING

## 2024-10-03 ASSESSMENT — PAIN DESCRIPTION - PAIN TYPE: TYPE: ACUTE PAIN

## 2024-10-03 ASSESSMENT — PAIN DESCRIPTION - ORIENTATION
ORIENTATION: LEFT
ORIENTATION: LEFT;RIGHT
ORIENTATION: RIGHT

## 2024-10-03 ASSESSMENT — PAIN - FUNCTIONAL ASSESSMENT: PAIN_FUNCTIONAL_ASSESSMENT: PREVENTS OR INTERFERES SOME ACTIVE ACTIVITIES AND ADLS

## 2024-10-03 ASSESSMENT — PAIN DESCRIPTION - FREQUENCY: FREQUENCY: INTERMITTENT

## 2024-10-03 ASSESSMENT — PAIN DESCRIPTION - ONSET: ONSET: PROGRESSIVE
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
## 2024-10-03 NOTE — PROGRESS NOTES
Occupational Therapy      [facility]  Occupational Therapy Not Seen Note    DATE: 10/3/2024    NAME: Komal Crawford  MRN: [id number]   : 1940      Patient not seen this date for Occupational Therapy due to:    Surgery/Procedure: Plan for OR with ortho today, will check back post-op      Electronically signed by Leila Santana OT on 10/3/2024
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
## 2024-10-03 NOTE — PLAN OF CARE
Problem: Chronic Conditions and Co-morbidities  Goal: Patient's chronic conditions and co-morbidity symptoms are monitored and maintained or improved  Outcome: Progressing     Problem: Pain  Goal: Verbalizes/displays adequate comfort level or baseline comfort level  Outcome: Progressing     Problem: Skin/Tissue Integrity  Goal: Absence of new skin breakdown  Description: 1.  Monitor for areas of redness and/or skin breakdown  2.  Assess vascular access sites hourly  3.  Every 4-6 hours minimum:  Change oxygen saturation probe site  4.  Every 4-6 hours:  If on nasal continuous positive airway pressure, respiratory therapy assess nares and determine need for appliance change or resting period.  Outcome: Progressing     Problem: Safety - Adult  Goal: Free from fall injury  Outcome: Progressing     Problem: ABCDS Injury Assessment  Goal: Absence of physical injury  Outcome: Progressing  Flowsheets (Taken 10/3/2024 0056)  Absence of Physical Injury: Implement safety measures based on patient assessment

## 2024-10-03 NOTE — PROGRESS NOTES
Orthopedic Progress Note    Patient:  Komal Crawford  YOB: 1940     84 y.o. female    Subjective:  Patient seen and examined this morning. No complaints or concerns, discussed the surgical procedure as well as weightbearing status. No issues overnight per nursing. Pain is well controlled on current regimen. Denies fever, HA, CP, SOB, N/V, dysuria, new numbness/tingling.  Did not work with PT the prior day, however work with them postoperatively.    Vitals reviewed, afebrile    Objective:   Vitals:    10/03/24 0225   BP:    Pulse:    Resp: 20   Temp:    SpO2:      Gen: NAD, cooperative , sleeping when entering  Cardiovascular: Regular rate   Respiratory: No acute respiratory distress, breathing comfortably on room air    Orthopedic Exam    LLE: Splint on left lower extremity, short leg.  It is clean, dry and intact.  Patient able to flex and extend all toes without difficulty.  Sensation intact all exposed toes.  There is brisk capillary refill to all exposed toes.  Exposed compartments are soft and easily compressible.  Limb looks warm and well-perfused.    Recent Labs     10/02/24  2215   WBC 13.4*   HGB 9.9*   HCT 31.5*   PLT See Reflexed IPF Result   INR 1.1      K 4.2   BUN 29*   CREATININE 1.0*   GLUCOSE 175*      Meds:   Abx: Ancef on-call to the OR, vancomycin  DVT ppx: EPC  See rec for complete list    Impression 84 y.o. female who is being seen for:    -Left bimalleolar ankle fracture    Plan  - Plan for OR 10/3 with Dr. Johnson pending risk stratification from primary team. Would appreciate recs.  This is for ORIF left ankle  -Follow-up on the CT of the left ankle  - Ancef OCTOR  - Splint on LLE. Okay to reinforce/maintain by nursing if necessary. Please notify Ortho if saturated.  - NWB  to the LLE  - Pain control and medical management per primary   - Multimodal pain control ordered   - DVT ppx:  EPC. Please hold chemical anticoagulation in anticipation for OR today. Okay to

## 2024-10-03 NOTE — PROGRESS NOTES
Physical Therapy        Physical Therapy Cancel Note      DATE: 10/3/2024    NAME: Komal Crawford  MRN: [id number]   : 1940      Patient not seen this date for Physical Therapy due to:    Surgery/Procedure: Plan for OR with ortho today, will check back post-op.       Electronically signed by Marco Correa PT on 10/3/2024 at 9:32 AM
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
## 2024-10-03 NOTE — CARE COORDINATION
Writer to room to conduct intake assessment, patient sound asleep and did not wake to verbal stimuli, will re attempt as time allows  1530- went to see pt for initial assessment, pt currently in OR

## 2024-10-03 NOTE — PROGRESS NOTES
PROGRESS NOTE          PATIENT NAME: Komal Crawford  MEDICAL RECORD NO. [id number]  DATE: 10/3/2024    HD: # 1      DIAGNOSIS AND PLAN    #Left Bimalleolar Fracture   - Ortho consulted, plan for OR today   - MMPT, ice   -Hematoma block per ortho  - NPO for OR       DVT Prophylaxis-SCD      Chief Complaint: \"My ankle hurts\"    SUBJECTIVE    Patient was seen and evaluated at bedside this morning.  No acute events overnight.  Patient reported that she was able to sleep through the night.  She does report some pain to her left ankle upon awakening.  She states that the pain medication has been helping.  Denies any shortness of breath.    OBJECTIVE  VITALS:   Vitals:    10/03/24 0740   BP: 137/67   Pulse: 98   Resp: 14   Temp: 98.1 °F (36.7 °C)   SpO2: 93%     Physical Exam  Vitals and nursing note reviewed.   Constitutional:       General: She is not in acute distress.     Appearance: Normal appearance.   HENT:      Head: Normocephalic.      Nose: Nose normal.      Mouth/Throat:      Mouth: Mucous membranes are moist.   Eyes:      Extraocular Movements: Extraocular movements intact.   Cardiovascular:      Rate and Rhythm: Normal rate.      Pulses: Normal pulses.   Pulmonary:      Effort: Pulmonary effort is normal.      Breath sounds: Normal breath sounds. No wheezing.   Abdominal:      Palpations: Abdomen is soft.      Tenderness: There is no abdominal tenderness.      Comments: Suprapubic catheter   Musculoskeletal:      Right shoulder: Tenderness present.      Right elbow: Tenderness present.      Left hand: Tenderness present.      Cervical back: Normal range of motion.      Right ankle: Tenderness present. Normal pulse.   Skin:     Comments: Some bruising across chest and lower abdomen, bruising across bilateral hands, bruising across right lower extremity   Neurological:      Mental Status: She is alert.               LAB:  CBC:   Recent Labs     10/02/24  1637 10/02/24  2215 10/03/24  0736   WBC 14.3* 13.4* 11.5*    HGB 10.5* 9.9* 10.2*   HCT 31.6* 31.5* 33.8*   MCV 86.2 89.2 92.9    See Reflexed IPF Result See Reflexed IPF Result     BMP:   Recent Labs     10/02/24  1637 10/02/24  2215 10/03/24  0736    138 138   K 4.0 4.2 4.5    105 105   CO2 23 24 21   BUN 32* 29* 27*   CREATININE 1.0* 1.0* 0.9   GLUCOSE 223* 175* 202*       RADIOLOGY:  XR right Elbow: No acute osseous abnormality  X-ray left hand: No acute osseous or soft tissue abnormality      Raphael Ugaled MD  10/3/2024, 8:41 AM
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
## 2024-10-03 NOTE — CONSULTS
Nausea and vomiting     Neuromuscular dysfunction of bladder, unspecified     Neuropathy     Obesity, unspecified     Occlusion and stenosis of unspecified cerebral artery     Osteoarthritis     Other abnormalities of gait and mobility     Other cervical disc degeneration, unspecified cervical region     Other chronic pain     Other hemorrhoids     Other malaise     Other specified disorders of bladder     Pain in left ankle and joints of left foot     Personal history of colonic polyps     Personal history of healed osteoporosis fracture     Personal history of nicotine dependence     Personal history of transient ischemic attack (TIA), and cerebral infarction without residual deficits     Personal history of urinary (tract) infections     Polyosteoarthritis, unspecified     Presence of artificial hip joint, bilateral     Presence of artificial knee joint, bilateral     Presence of coronary angioplasty implant and graft     Presence of intraocular lens     Presence of urogenital implant     Pressure ulcer of left buttock, stage 2 (HCC)     Pressure ulcer of right buttock, stage 2 (HCC)     Pressure ulcer of right heel, stage 1     Pure hypercholesterolemia, unspecified     Rectal prolapse     Resistance to multiple antibiotics     Retention of urine, unspecified     Ringing in ears     Self-catheterizes urinary bladder     6-7 times daily    Sepsis, unspecified organism (HCC)     TIA (transient ischemic attack) 2000    x1    Tinea corporis     Tinnitus, unspecified ear     Tubular adenoma     colon polyps    Type 2 diabetes mellitus with diabetic autonomic (poly)neuropathy (HCC)     Type 2 diabetes mellitus with diabetic peripheral angiopathy without gangrene (HCC)     Type II or unspecified type diabetes mellitus without mention of complication, not stated as uncontrolled     Unspecified open wound, left lower leg, subsequent encounter     Unsteadiness on feet     Urinary incontinence     frequent UTI s/p  surgical incisions present to the anterior aspect of the knee and left hip.  Mild tenderness to palpation about the left knee, appropriately tender about the ankle.  Compartments soft and easily compressible. EHL/FHL/TA/GS complex motor intact. Sural/saphenous/SPN/DPN/plantar nerve distribution SILT however with baseline neuropathic changes which are normal for patient. Patient has no groin pain with log roll maneuver.  Knee ligamentous exam deferred secondary to known hinged distal femoral replacement.  Foot is warm and well-perfused with BCR.    Labs:  Recent Labs     10/02/24  2215   WBC 13.4*   HGB 9.9*   HCT 31.5*   PLT See Reflexed IPF Result   INR 1.1      K 4.2   BUN 29*   CREATININE 1.0*   GLUCOSE 175*        Imaging:   Multiple radiographic views of the left ankle demonstrating a distal fibular fracture below the level of the syndesmosis with associated medial malleolus fracture.  Consistent with a pronation abduction mechanism.  Concern for superior medial dome impaction.  Will obtain CT to evaluate further.    Assessment: 84 y.o. female being seen for:    -Left bimalleolar ankle fracture      Plan:    -Plan for OR on 10/3 with Dr. Johnson for ORIF of left ankle  - F/u CT left ankle  -Hematoma block provided, short leg splint applied  -WB status: Nonweightbearing to left lower extremity  -Diet: Patient will be n.p.o. starting on 10/3 at 0015  -abx/tetanus: Ancef on-call the OR  -consent/marked  -F/u VitD level  -Pain control per primary team discretion  -Ice and elevate extremity for pain and swelling  -Please contact ortho with any questions    Procedure Note: Hematoma block: Risks, benefits, and alternatives were discussed with the patient, and verbal consent was obtained for hematoma block.  10 cc of 1% plain lidocaine was injected into the left ankle joint.  Once adequate pain control had been achieved, reduction maneuver was performed and fragment position was confirmed on X-ray.  A well-padded

## 2024-10-03 NOTE — ED NOTES
Updated report given to LETY Masters from [facility].   Report method by phone   The following was reviewed with receiving RN:   Current vital signs:  /78   Pulse 82   Temp 97.8 °F (36.6 °C) (Oral)   Resp 15   Ht 1.448 m (4' 9\")   Wt 67.1 kg (148 lb)   SpO2 97%   BMI 32.03 kg/m²                MEWS Score: 2     Any medication or safety alerts were reviewed. Any pending diagnostics and notifications were also reviewed, as well as any safety concerns or issues, abnormal labs, abnormal imaging, and abnormal assessment findings. Questions were answered.

## 2024-10-03 NOTE — CARE COORDINATION
Trauma Coordinator Rounding Note  Daily check in:  I met with Komal Crawford  at bedside to review their plan of care. I allowed opportunity for Komal Crawford to ask questions regarding their injuries, expected length of stay and disposition plan. Plan is for patient to go to surgery with ortho at noon today.  All questions answered to verbal satisfaction.   [x]Wounds  [x]PT/OT/speech  []Incentive spirometer  [x]Diet  [x]Activity  []Preferred pharmacy  [x]TRC consulted  DC Expectation:  Patient expects to be discharged to  unknown at this time. Need PT/OT eval.   Bedside Nurse:  I spoke with the patient's assigned nurse to review the plan of care for today and provide an opportunity to ask questions or relay any concerns to the Trauma service. No needs currently.    Discharge Info:  I confirmed follow up information was placed in the discharge instructions for  unknown .   Discharge instructions reviewed and updated in patient's chart.   :  I provided a clinical update to the unit  and confirmed the disposition plan. Current barriers to discharge include: not medically stable for discharge due to Planned Surgical procedure  Electronically signed by Ramila Matthews RN on 10/3/2024 at 11:57 AM

## 2024-10-03 NOTE — PLAN OF CARE
Orthopedic Surgery Plan of Care Note    Imaging and case reviewed with Dr. Johnson.  At this point time we will trial nonoperative management of the left ankle fracture.  Patient will continue to be nonweightbearing to the left lower extremity.  Please maintain splint at all times.  Okay for diet from orthopedic surgery perspective.  Please page orthopedic surgery resident on-call with any questions or concerns.    Dylan Ward MD  Orthopedic Surgery Resident, PGY-3  [place], [place]
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
## 2024-10-03 NOTE — PROGRESS NOTES
15 Variable Trauma-Specific Frailty Index   Comorbidities   Cancer History Yes (1) No (0)   Coronary Heart Disease MI (1) CABG (0.75) Mild (0.25)  No (0)   Dementia Severe (1) Moderate (0.5) Mild (0.25)  No (0)   Daily Activities   Help With Grooming Yes (1) No (0)   Help with Managing Money Yes (1) No (0)   Help doing Housework Yes (1) No (0)   Help with Toileting Yes (1) No (0)   Help Walking Wheelchair (1) Walker (0.75) Cane (0.5) No (0)   Health Attitude   Feel Less Useful Most Time (1) Sometimes (0.5) Never (0)   Feel Sad Most Time (1) Sometimes (0.5) Never (0)   Feel Effort to Do Everything Most Time (1) Sometimes (0.5) Never (0)   Feels Lonely Most Time (1) Sometimes (0.5) Never (0)   Falls Most Time (1) Sometimes (0.5) Never (0)   Function   Sexually Active Yes (0)  No(1)   Nutrition   Albumin < 3g/dL (1)  > 3g/dL   Scoring   Score  7.75 FI (Score/15)  0.52 > 0.25 = Frail   *Based on 2-weeks prior to hospital admission   Trauma Specific Fraility Index > or = to 4 (4/15 = 0.26)  Trauma Specific Fraility Index Score:    Frail

## 2024-10-03 NOTE — ED NOTES
Report given to EMT from [facility].   Report method in person   The following was reviewed with receiving RN:   Current vital signs:  /78   Pulse 82   Temp 97.8 °F (36.6 °C) (Oral)   Resp 15   Ht 1.448 m (4' 9\")   Wt 67.1 kg (148 lb)   SpO2 97%   BMI 32.03 kg/m²                MEWS Score: 2     Any medication or safety alerts were reviewed. Any pending diagnostics and notifications were also reviewed, as well as any safety concerns or issues, abnormal labs, abnormal imaging, and abnormal assessment findings. Questions were answered.

## 2024-10-03 NOTE — DISCHARGE INSTRUCTIONS
Orthopaedic Instructions:  -Weight bearing status: Non weight bearing with the left leg  -Do not remove dressings or splint until your post-injury follow up date. It is important that you do not get your splint wet. To avoid this and still maintain proper hygiene, you can wrap a garbage/plastic bag (or similar waterproof material) about the splinted leg and secure it with tape while showering. One should still attempt to keep splint out of water with this method. If your splint were to fall off, it is important that you do not attempt to put it back on. Instead, return to the orthopaedic clinic for reapplication (see number below to call).  -Always look for signs of compartment syndrome: pain out of proportion to the injury, pain not controlled with pain medication, numbness in digits, changing of color of digits (paleness). If these signs occur return to ED immediately for reassessment.  -Always work on toe motion (to non-injured toes) while in splint to decrease swelling.  -Ice (20 minutes on and off 1 hour) and elevate above the level of the heart to reduce swelling and throbbing pain.  -Take medications as prescribed.  -Wean off narcotics (percocet/norco) as soon as possible. Do not take tylenol if still taking narcotics.  -Follow up with Dr. Johnson in his office 10/21/24 at 10am. Call [phone] to schedule/confirm or with any questions/concerns.

## 2024-10-03 NOTE — ED NOTES
C-collar placed. C-spine precautions maintained. Writer placed gauze and paper tape on laceration to right shin. Bleeding controlled at this time.
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
## 2024-10-04 ENCOUNTER — APPOINTMENT (OUTPATIENT)
Dept: PHYSICAL THERAPY | Age: 84
DRG: 563 | End: 2024-10-04
Payer: MEDICARE

## 2024-10-04 LAB
ANION GAP SERPL CALCULATED.3IONS-SCNC: 10 MMOL/L (ref 9–16)
BASOPHILS # BLD: 0.06 K/UL (ref 0–0.2)
BASOPHILS NFR BLD: 1 % (ref 0–2)
BUN SERPL-MCNC: 21 MG/DL (ref 8–23)
CALCIUM SERPL-MCNC: 8.6 MG/DL (ref 8.6–10.4)
CHLORIDE SERPL-SCNC: 105 MMOL/L (ref 98–107)
CO2 SERPL-SCNC: 22 MMOL/L (ref 20–31)
CREAT SERPL-MCNC: 0.8 MG/DL (ref 0.5–0.9)
EOSINOPHIL # BLD: 0.24 K/UL (ref 0–0.44)
EOSINOPHILS RELATIVE PERCENT: 3 % (ref 1–4)
ERYTHROCYTE [DISTWIDTH] IN BLOOD BY AUTOMATED COUNT: 16.6 % (ref 11.8–14.4)
GFR, ESTIMATED: 75 ML/MIN/1.73M2
GLUCOSE BLD-MCNC: 143 MG/DL (ref 65–105)
GLUCOSE BLD-MCNC: 152 MG/DL (ref 65–105)
GLUCOSE BLD-MCNC: 197 MG/DL (ref 65–105)
GLUCOSE BLD-MCNC: 254 MG/DL (ref 65–105)
GLUCOSE SERPL-MCNC: 168 MG/DL (ref 74–99)
HCT VFR BLD AUTO: 29.1 % (ref 36.3–47.1)
HGB BLD-MCNC: 8.7 G/DL (ref 11.9–15.1)
IMM GRANULOCYTES # BLD AUTO: 0.05 K/UL (ref 0–0.3)
IMM GRANULOCYTES NFR BLD: 1 %
LYMPHOCYTES NFR BLD: 1.26 K/UL (ref 1.1–3.7)
LYMPHOCYTES RELATIVE PERCENT: 16 % (ref 24–43)
MCH RBC QN AUTO: 27.5 PG (ref 25.2–33.5)
MCHC RBC AUTO-ENTMCNC: 29.9 G/DL (ref 28.4–34.8)
MCV RBC AUTO: 92.1 FL (ref 82.6–102.9)
MICROORGANISM SPEC CULT: ABNORMAL
MONOCYTES NFR BLD: 0.73 K/UL (ref 0.1–1.2)
MONOCYTES NFR BLD: 10 % (ref 3–12)
NEUTROPHILS NFR BLD: 70 % (ref 36–65)
NEUTS SEG NFR BLD: 5.32 K/UL (ref 1.5–8.1)
NRBC BLD-RTO: 0 PER 100 WBC
PLATELET # BLD AUTO: ABNORMAL K/UL (ref 138–453)
PLATELET, FLUORESCENCE: 121 K/UL (ref 138–453)
PLATELETS.RETICULATED NFR BLD AUTO: 14.7 % (ref 1.1–10.3)
POTASSIUM SERPL-SCNC: 4.2 MMOL/L (ref 3.7–5.3)
RBC # BLD AUTO: 3.16 M/UL (ref 3.95–5.11)
RBC # BLD: ABNORMAL 10*6/UL
SODIUM SERPL-SCNC: 137 MMOL/L (ref 136–145)
SPECIMEN DESCRIPTION: ABNORMAL
WBC OTHER # BLD: 7.7 K/UL (ref 3.5–11.3)

## 2024-10-04 PROCEDURE — 97530 THERAPEUTIC ACTIVITIES: CPT

## 2024-10-04 PROCEDURE — 6370000000 HC RX 637 (ALT 250 FOR IP): Performed by: STUDENT IN AN ORGANIZED HEALTH CARE EDUCATION/TRAINING PROGRAM

## 2024-10-04 PROCEDURE — 99232 SBSQ HOSP IP/OBS MODERATE 35: CPT | Performed by: INTERNAL MEDICINE

## 2024-10-04 PROCEDURE — 80048 BASIC METABOLIC PNL TOTAL CA: CPT

## 2024-10-04 PROCEDURE — 6370000000 HC RX 637 (ALT 250 FOR IP): Performed by: INTERNAL MEDICINE

## 2024-10-04 PROCEDURE — 85055 RETICULATED PLATELET ASSAY: CPT

## 2024-10-04 PROCEDURE — 6370000000 HC RX 637 (ALT 250 FOR IP)

## 2024-10-04 PROCEDURE — 6370000000 HC RX 637 (ALT 250 FOR IP): Performed by: NURSE PRACTITIONER

## 2024-10-04 PROCEDURE — 6360000002 HC RX W HCPCS: Performed by: NURSE PRACTITIONER

## 2024-10-04 PROCEDURE — 99232 SBSQ HOSP IP/OBS MODERATE 35: CPT | Performed by: SURGERY

## 2024-10-04 PROCEDURE — 51705 CHANGE OF BLADDER TUBE: CPT

## 2024-10-04 PROCEDURE — 97162 PT EVAL MOD COMPLEX 30 MIN: CPT

## 2024-10-04 PROCEDURE — 97535 SELF CARE MNGMENT TRAINING: CPT

## 2024-10-04 PROCEDURE — 85025 COMPLETE CBC W/AUTO DIFF WBC: CPT

## 2024-10-04 PROCEDURE — 99223 1ST HOSP IP/OBS HIGH 75: CPT | Performed by: PHYSICAL MEDICINE & REHABILITATION

## 2024-10-04 PROCEDURE — 82947 ASSAY GLUCOSE BLOOD QUANT: CPT

## 2024-10-04 PROCEDURE — 36415 COLL VENOUS BLD VENIPUNCTURE: CPT

## 2024-10-04 PROCEDURE — 97129 THER IVNTJ 1ST 15 MIN: CPT

## 2024-10-04 PROCEDURE — 2060000000 HC ICU INTERMEDIATE R&B

## 2024-10-04 PROCEDURE — 2580000003 HC RX 258: Performed by: STUDENT IN AN ORGANIZED HEALTH CARE EDUCATION/TRAINING PROGRAM

## 2024-10-04 PROCEDURE — 97166 OT EVAL MOD COMPLEX 45 MIN: CPT

## 2024-10-04 RX ORDER — LEVOFLOXACIN 500 MG/1
500 TABLET, FILM COATED ORAL ONCE
Status: COMPLETED | OUTPATIENT
Start: 2024-10-04 | End: 2024-10-04

## 2024-10-04 RX ORDER — LEVOFLOXACIN 500 MG/1
500 TABLET, FILM COATED ORAL DAILY
Status: DISCONTINUED | OUTPATIENT
Start: 2024-10-04 | End: 2024-10-04 | Stop reason: DRUGHIGH

## 2024-10-04 RX ORDER — OXYCODONE HYDROCHLORIDE 5 MG/1
5 TABLET ORAL EVERY 6 HOURS PRN
Qty: 28 TABLET | Refills: 0 | Status: SHIPPED | OUTPATIENT
Start: 2024-10-04 | End: 2024-10-05

## 2024-10-04 RX ORDER — LEVOFLOXACIN 250 MG/1
250 TABLET, FILM COATED ORAL DAILY
Qty: 6 TABLET | Refills: 0 | Status: SHIPPED | OUTPATIENT
Start: 2024-10-05 | End: 2024-10-11

## 2024-10-04 RX ORDER — LEVOFLOXACIN 250 MG/1
250 TABLET, FILM COATED ORAL DAILY
Status: DISCONTINUED | OUTPATIENT
Start: 2024-10-05 | End: 2024-10-05 | Stop reason: HOSPADM

## 2024-10-04 RX ADMIN — ACETAMINOPHEN 1000 MG: 500 TABLET ORAL at 20:39

## 2024-10-04 RX ADMIN — OXYCODONE 5 MG: 5 TABLET ORAL at 09:45

## 2024-10-04 RX ADMIN — ACETAMINOPHEN 1000 MG: 500 TABLET ORAL at 13:56

## 2024-10-04 RX ADMIN — ATORVASTATIN CALCIUM 10 MG: 10 TABLET, FILM COATED ORAL at 08:32

## 2024-10-04 RX ADMIN — INSULIN LISPRO 8 UNITS: 100 INJECTION, SOLUTION INTRAVENOUS; SUBCUTANEOUS at 20:39

## 2024-10-04 RX ADMIN — BACITRACIN: 500 OINTMENT TOPICAL at 08:31

## 2024-10-04 RX ADMIN — SENNOSIDES 8.6 MG: 8.6 TABLET, COATED ORAL at 20:37

## 2024-10-04 RX ADMIN — RANOLAZINE 500 MG: 500 TABLET, FILM COATED, EXTENDED RELEASE ORAL at 20:39

## 2024-10-04 RX ADMIN — BACITRACIN: 500 OINTMENT TOPICAL at 13:56

## 2024-10-04 RX ADMIN — SODIUM CHLORIDE, PRESERVATIVE FREE 10 ML: 5 INJECTION INTRAVENOUS at 08:33

## 2024-10-04 RX ADMIN — INSULIN GLARGINE 10 UNITS: 100 INJECTION, SOLUTION SUBCUTANEOUS at 20:39

## 2024-10-04 RX ADMIN — PANTOPRAZOLE SODIUM 40 MG: 40 TABLET, DELAYED RELEASE ORAL at 08:26

## 2024-10-04 RX ADMIN — LEVOFLOXACIN 500 MG: 500 TABLET, FILM COATED ORAL at 08:42

## 2024-10-04 RX ADMIN — RANOLAZINE 500 MG: 500 TABLET, FILM COATED, EXTENDED RELEASE ORAL at 08:33

## 2024-10-04 RX ADMIN — ENOXAPARIN SODIUM 30 MG: 100 INJECTION SUBCUTANEOUS at 08:32

## 2024-10-04 RX ADMIN — OXYCODONE 5 MG: 5 TABLET ORAL at 20:38

## 2024-10-04 RX ADMIN — ENOXAPARIN SODIUM 30 MG: 100 INJECTION SUBCUTANEOUS at 21:00

## 2024-10-04 RX ADMIN — BACITRACIN: 500 OINTMENT TOPICAL at 20:42

## 2024-10-04 ASSESSMENT — PAIN DESCRIPTION - LOCATION
LOCATION: ABDOMEN
LOCATION: LEG
LOCATION: LEG

## 2024-10-04 ASSESSMENT — PAIN DESCRIPTION - DESCRIPTORS
DESCRIPTORS: ACHING;DISCOMFORT
DESCRIPTORS: DISCOMFORT;NAGGING

## 2024-10-04 ASSESSMENT — PAIN DESCRIPTION - PAIN TYPE
TYPE: ACUTE PAIN
TYPE: ACUTE PAIN

## 2024-10-04 ASSESSMENT — PAIN - FUNCTIONAL ASSESSMENT
PAIN_FUNCTIONAL_ASSESSMENT: PREVENTS OR INTERFERES WITH MANY ACTIVE NOT PASSIVE ACTIVITIES
PAIN_FUNCTIONAL_ASSESSMENT: PREVENTS OR INTERFERES SOME ACTIVE ACTIVITIES AND ADLS

## 2024-10-04 ASSESSMENT — PAIN SCALES - GENERAL
PAINLEVEL_OUTOF10: 10
PAINLEVEL_OUTOF10: 10
PAINLEVEL_OUTOF10: 8
PAINLEVEL_OUTOF10: 2
PAINLEVEL_OUTOF10: 6
PAINLEVEL_OUTOF10: 0
PAINLEVEL_OUTOF10: 0

## 2024-10-04 ASSESSMENT — PAIN DESCRIPTION - ORIENTATION
ORIENTATION: RIGHT;ANTERIOR;LOWER
ORIENTATION: LEFT
ORIENTATION: RIGHT;LOWER

## 2024-10-04 ASSESSMENT — PAIN DESCRIPTION - FREQUENCY: FREQUENCY: INTERMITTENT

## 2024-10-04 ASSESSMENT — PAIN DESCRIPTION - ONSET
ONSET: PROGRESSIVE
ONSET: PROGRESSIVE

## 2024-10-04 NOTE — PROGRESS NOTES
CLINICAL PHARMACY NOTE: MEDS TO BEDS    Total # of Prescriptions Filled: 3   The following medications were delivered to the patient:  LEVAQUIN 250MG  VIT D 1.25MG  OXY 5MG     Additional Documentation:    Called mom, no answer, unable to leave voicemail
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
## 2024-10-04 NOTE — CARE COORDINATION
Transitional Planning:   Spoke german Macdonald in [facility] : pt unable to get surgery high risk per cardio. Plan is ARU- PM&R consulted, awaiting evcristobal Boyle0-Sofy RN states pt denied for ARU per PM&R. New plan for SNF, pt was at [facility] for hip replacement and would like to go there. Referral sent.

## 2024-10-04 NOTE — PROGRESS NOTES
Pharmacy Note - Renal dose adjustment made per P/T protocol    Original order:  Levofloxacin 500mg PO daily     Estimated Creatinine Clearance: 41 mL/min (based on SCr of 0.9 mg/dL).    Recent Labs     10/02/24  1637 10/02/24  2215 10/03/24  0736   BUN 32* 29* 27*   CREATININE 1.0* 1.0* 0.9       Renally adjusted order:  Levofloxacin 500mg PO once followed by 250mg PO Q 24 hours for CrCl 20-49mL/min    Please call pharmacy with any questions.    Thank you,  Maryuri Fowler [facility]  10/4/2024 7:57 AM
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
## 2024-10-04 NOTE — PROGRESS NOTES
Speech Language Pathology  [facility]    Cognitive Treatment Note    Date: 10/4/2024  Patient’s Name: Komal Crawford  MRN: [id number]  Patient Active Problem List   Diagnosis Code    TIA (transient ischemic attack) G45.9    Chronic back pain M54.9, G89.29    Diabetic peripheral neuropathy ([facility]) E11.42    Macular degeneration of left eye H35.30    Constipation K59.00    Thrombocytopenia ([facility]) D69.6    B12 deficiency E53.8    Vitamin D deficiency E55.9    Anemia D64.9    DDD (degenerative disc disease), cervical M50.30    Age-related cognitive decline R41.81    Acquired cyst of kidney N28.1    Microscopic hematuria R31.29    Bilateral renal cysts N28.1    Essential hypertension I10    Gastroesophageal reflux disease without esophagitis K21.9    Mixed incontinence N39.46    Recurrent UTI N39.0    Pure hypercholesterolemia E78.00    Hiatal hernia K44.9    Diabetic gastroparesis ([facility]) E11.43, K31.84    Internal hemorrhoid K64.8    Tubular adenoma D36.9    Colon polyps K63.5    Urge incontinence N39.41    S/P lumbar fusion Z98.1    Chronic ITP (idiopathic thrombocytopenia) ([facility]) D69.3    Urinary retention R33.9    Unsteadiness R26.81    Anxiety F41.9    Arthritis M19.90    Nausea R11.0    Diarrhea R19.7    Extrarenal pelvis [id number]    Primary osteoarthritis of left knee M17.12    Type 2 diabetes mellitus with diabetic peripheral angiopathy without gangrene, without long-term current use of insulin ([facility]) E11.51    Memory loss R41.3    Urethral pain R39.89    Bladder spasms N32.89    Atrophic vaginitis N95.2    Coronary artery disease involving native coronary artery of native heart without angina pectoris I25.10    Chronic fatigue R53.82    Essential tremor G25.0    Neuropathy G62.9    Coronary angioplasty status Z98.61    Recurrent major depressive disorder, in remission ([facility]) F33.40    Inability to walk R26.2    Presence of drug-eluting stent in right coronary artery Z95.5    Arthritis of right hip M16.11
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
## 2024-10-04 NOTE — PROGRESS NOTES
[facility] Inpatient Progress Note  JAUN MCRAE   10/4/2024    Komal Crawford  1940  [id number]      Time Spent with Patient: 5 minutes with patient and     At request of Stacy Echeverria, this writer will be covering this patient while Stacy is out of the office next week.  This writer met with patient and  to introduce myself.  Patient had just been given pain medications and was unable to stay awake for any further interventions.  Plan to meet with patient again on Monday if she is still admitted.

## 2024-10-04 NOTE — PLAN OF CARE
Problem: Chronic Conditions and Co-morbidities  Goal: Patient's chronic conditions and co-morbidity symptoms are monitored and maintained or improved  Outcome: Progressing     Problem: Pain  Goal: Verbalizes/displays adequate comfort level or baseline comfort level  Outcome: Progressing     Problem: Skin/Tissue Integrity  Goal: Absence of new skin breakdown  Description: 1.  Monitor for areas of redness and/or skin breakdown  2.  Assess vascular access sites hourly  3.  Every 4-6 hours minimum:  Change oxygen saturation probe site  4.  Every 4-6 hours:  If on nasal continuous positive airway pressure, respiratory therapy assess nares and determine need for appliance change or resting period.  Outcome: Progressing     Problem: Safety - Adult  Goal: Free from fall injury  Outcome: Progressing     Problem: ABCDS Injury Assessment  Goal: Absence of physical injury  Outcome: Progressing     Problem: Nutrition Deficit:  Goal: Optimize nutritional status  Outcome: Progressing
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
## 2024-10-04 NOTE — PROGRESS NOTES
Physician Progress Note      PATIENT:               COLLIN SYLVESTER  CSN #:                  [id number]  :                       1940  ADMIT DATE:       2024 4:40 PM  DISCH DATE:        2024 10:13 AM  RESPONDING  PROVIDER #:        Sohan Aguilar MD          QUERY TEXT:    Pt admitted with UTI.  Pt noted to have suprapubic catheter placed 2 weeks   PTA.  If possible, please document in the progress notes and discharge summary   if you are evaluating and/or treating any of the following:    The medical record reflects the following:  Risk Factors: neurogenic bladder, history of UTI, chronic SP catheter  Clinical Indicators: H&P   She had suprapubic catheter replacement   approximately 2 weeks ago due to neurogenic bladder. admitted for UTI, having   hematuria, abdominal pain. reports urethra and rectal spasms. UA red, +   nitrate, large leukocyte, too numerous WBC.  Treatment:  Levaquin and vancomycin    Thank You, Blanca CDS  Options provided:  -- UTI due to suprapubic catheter  -- UTI not due to suprapubic catheter  -- Other - I will add my own diagnosis  -- Disagree - Not applicable / Not valid  -- Disagree - Clinically unable to determine / Unknown  -- Refer to Clinical Documentation Reviewer    PROVIDER RESPONSE TEXT:    UTI is due to suprapubic catheter.    Query created by: Yessica Nichols on 10/4/2024 1:32 PM      Electronically signed by:  Sohan Aguilar MD 10/4/2024 1:37 PM

## 2024-10-04 NOTE — PROGRESS NOTES
PROGRESS NOTE          PATIENT NAME: Komal Crawford  MEDICAL RECORD NO. [id number]  DATE: 10/4/2024    HD: # 2      DIAGNOSIS AND PLAN    #Left Bimalleolar Fracture   -Cardiology was consulted for RCRI is class IV, moderate to high risk for surgery.  -Per orthopedic surgery team, they will trial nonoperative management with a splint/cast of the left ankle fracture due to her compared to the, patient still able to walk in 6 to 8 weeks if the follow-up x-ray showed that the bone is healing appropriately.  -Medicine is consulted due to high frailty score   -Pending urology for possible suprapubic catheter exchange.  Started levofloxacin for UTI at this point, pending urology recommendation.  -MMPT, ice   -Regular diet    DVT Prophylaxis-Lovenox 30 twice daily    Dispo: Once patient is seen by urology, patient can be discharged to either rehab facility or skilled nursing facility.   and PM&R is consulted for placement        Chief Complaint: \"My ankle hurts\"    SUBJECTIVE    Patient was seen and evaluated at bedside this morning.  No acute events overnight.  Patient reported that she was able to sleep through the night.  She does report some pain to her left ankle upon awakening.  She states that the pain medication has been helping.  Denies any shortness of breath.    OBJECTIVE  VITALS:   Vitals:    10/04/24 0755   BP: (!) 151/67   Pulse: 82   Resp: 14   Temp: 98.4 °F (36.9 °C)   SpO2: 93%     Physical Exam  Vitals and nursing note reviewed.   Constitutional:       General: She is not in acute distress.     Appearance: Normal appearance.   HENT:      Head: Normocephalic.      Nose: Nose normal.      Mouth/Throat:      Mouth: Mucous membranes are moist.   Eyes:      Extraocular Movements: Extraocular movements intact.   Cardiovascular:      Rate and Rhythm: Normal rate.      Pulses: Normal pulses.   Pulmonary:      Effort: Pulmonary effort is normal.      Breath sounds: Normal breath sounds. No wheezing.    Abdominal:      Palpations: Abdomen is soft.      Tenderness: There is no abdominal tenderness.      Comments: Suprapubic catheter   Musculoskeletal:      Right shoulder: Tenderness present.      Right elbow: Tenderness present.      Left hand: Tenderness present.      Cervical back: Normal range of motion.      Right ankle: Tenderness present. Normal pulse.   Skin:     Comments: Some bruising across chest and lower abdomen, bruising across bilateral hands, bruising across right lower extremity   Neurological:      Mental Status: She is alert.               LAB:  CBC:   Recent Labs     10/02/24  2215 10/03/24  0736 10/04/24  0738   WBC 13.4* 11.5* 7.7   HGB 9.9* 10.2* 8.7*   HCT 31.5* 33.8* 29.1*   MCV 89.2 92.9 92.1   PLT See Reflexed IPF Result See Reflexed IPF Result See Reflexed IPF Result     BMP:   Recent Labs     10/02/24  1637 10/02/24  2215 10/03/24  0736    138 138   K 4.0 4.2 4.5    105 105   CO2 23 24 21   BUN 32* 29* 27*   CREATININE 1.0* 1.0* 0.9   GLUCOSE 223* 175* 202*       RADIOLOGY:  XR SHOULDER RIGHT (MIN 2 VIEWS)    Result Date: 10/4/2024  1. No acute fracture or dislocation. 2. Mild degenerative changes of the glenohumeral and AC joints.     XR FOOT LEFT (MIN 3 VIEWS)    Result Date: 10/4/2024  1. Acute fracture of the distal fibula. 2. Acute fracture of the distal medial tibia with extension to the tibial plafond. 3. Diffuse ankle soft tissue swelling. 4. No acute fracture or dislocation of the foot. 5. Follow-up radiograph demonstrates splinting of the left ankle with no change in alignment.     XR ANKLE LEFT (MIN 3 VIEWS)    Result Date: 10/4/2024  1. Acute fracture of the distal fibula. 2. Acute fracture of the distal medial tibia with extension to the tibial plafond. 3. Diffuse ankle soft tissue swelling. 4. No acute fracture or dislocation of the foot. 5. Follow-up radiograph demonstrates splinting of the left ankle with no change in alignment.     XR ANKLE LEFT (MIN 3

## 2024-10-04 NOTE — PROCEDURES
Procedure note    Date: 10/4/2024  Location: [facility]    Procedure:  Bedside suprapubic catheter upsizing and exchange    Indications for procedure:  Patient is an 84-year-old female with history of neurogenic bladder who underwent cystoscopy and suprapubic tube placement on 9/6/2024.  Requires first exchange of suprapubic catheter.  She had recent history of clogging of catheter, therefore, we discussed upsizing.     Narrative of procedure:  Patient was prepped and draped in standard sterile fashion.  Existing 16 Tamazight suprapubic catheter was removed.  A new 18 Tamazight catheter was inserted via the suprapubic tract and 10 cc of sterile water was inserted in the balloon.  There was immediate return of urine.  Patient tolerated the procedure well.      Plan:  Patient will follow-up for next suprapubic catheter exchange on 11/4/24 with Dr. Renny Sahni MD  Urology Resident, PGY-3

## 2024-10-05 VITALS
RESPIRATION RATE: 18 BRPM | BODY MASS INDEX: 31.96 KG/M2 | HEART RATE: 77 BPM | OXYGEN SATURATION: 91 % | DIASTOLIC BLOOD PRESSURE: 80 MMHG | HEIGHT: 57 IN | SYSTOLIC BLOOD PRESSURE: 99 MMHG | WEIGHT: 148.15 LBS | TEMPERATURE: 98.1 F

## 2024-10-05 LAB
ANION GAP SERPL CALCULATED.3IONS-SCNC: 9 MMOL/L (ref 9–16)
BASOPHILS # BLD: 0.06 K/UL (ref 0–0.2)
BASOPHILS NFR BLD: 1 % (ref 0–2)
BUN SERPL-MCNC: 19 MG/DL (ref 8–23)
CALCIUM SERPL-MCNC: 8.7 MG/DL (ref 8.6–10.4)
CHLORIDE SERPL-SCNC: 104 MMOL/L (ref 98–107)
CO2 SERPL-SCNC: 24 MMOL/L (ref 20–31)
CREAT SERPL-MCNC: 0.8 MG/DL (ref 0.5–0.9)
EOSINOPHIL # BLD: 0.28 K/UL (ref 0–0.44)
EOSINOPHILS RELATIVE PERCENT: 3 % (ref 1–4)
ERYTHROCYTE [DISTWIDTH] IN BLOOD BY AUTOMATED COUNT: 16.5 % (ref 11.8–14.4)
GFR, ESTIMATED: 75 ML/MIN/1.73M2
GLUCOSE BLD-MCNC: 147 MG/DL (ref 65–105)
GLUCOSE BLD-MCNC: 247 MG/DL (ref 65–105)
GLUCOSE SERPL-MCNC: 149 MG/DL (ref 74–99)
HCT VFR BLD AUTO: 27.4 % (ref 36.3–47.1)
HGB BLD-MCNC: 8.2 G/DL (ref 11.9–15.1)
IMM GRANULOCYTES # BLD AUTO: 0.07 K/UL (ref 0–0.3)
IMM GRANULOCYTES NFR BLD: 1 %
LYMPHOCYTES NFR BLD: 1.26 K/UL (ref 1.1–3.7)
LYMPHOCYTES RELATIVE PERCENT: 15 % (ref 24–43)
MAGNESIUM SERPL-MCNC: 1.9 MG/DL (ref 1.6–2.4)
MCH RBC QN AUTO: 27.5 PG (ref 25.2–33.5)
MCHC RBC AUTO-ENTMCNC: 29.9 G/DL (ref 28.4–34.8)
MCV RBC AUTO: 91.9 FL (ref 82.6–102.9)
MONOCYTES NFR BLD: 0.82 K/UL (ref 0.1–1.2)
MONOCYTES NFR BLD: 10 % (ref 3–12)
NEUTROPHILS NFR BLD: 71 % (ref 36–65)
NEUTS SEG NFR BLD: 6.15 K/UL (ref 1.5–8.1)
NRBC BLD-RTO: 0 PER 100 WBC
PLATELET # BLD AUTO: ABNORMAL K/UL (ref 138–453)
PLATELET, FLUORESCENCE: 130 K/UL (ref 138–453)
PLATELETS.RETICULATED NFR BLD AUTO: 17.4 % (ref 1.1–10.3)
POTASSIUM SERPL-SCNC: 4.4 MMOL/L (ref 3.7–5.3)
RBC # BLD AUTO: 2.98 M/UL (ref 3.95–5.11)
SODIUM SERPL-SCNC: 137 MMOL/L (ref 136–145)
WBC OTHER # BLD: 8.6 K/UL (ref 3.5–11.3)

## 2024-10-05 PROCEDURE — 6370000000 HC RX 637 (ALT 250 FOR IP): Performed by: STUDENT IN AN ORGANIZED HEALTH CARE EDUCATION/TRAINING PROGRAM

## 2024-10-05 PROCEDURE — 85025 COMPLETE CBC W/AUTO DIFF WBC: CPT

## 2024-10-05 PROCEDURE — 80048 BASIC METABOLIC PNL TOTAL CA: CPT

## 2024-10-05 PROCEDURE — 6370000000 HC RX 637 (ALT 250 FOR IP)

## 2024-10-05 PROCEDURE — 99231 SBSQ HOSP IP/OBS SF/LOW 25: CPT | Performed by: SURGERY

## 2024-10-05 PROCEDURE — 99232 SBSQ HOSP IP/OBS MODERATE 35: CPT | Performed by: INTERNAL MEDICINE

## 2024-10-05 PROCEDURE — 85055 RETICULATED PLATELET ASSAY: CPT

## 2024-10-05 PROCEDURE — 83735 ASSAY OF MAGNESIUM: CPT

## 2024-10-05 PROCEDURE — 2580000003 HC RX 258: Performed by: STUDENT IN AN ORGANIZED HEALTH CARE EDUCATION/TRAINING PROGRAM

## 2024-10-05 PROCEDURE — 82947 ASSAY GLUCOSE BLOOD QUANT: CPT

## 2024-10-05 PROCEDURE — 6370000000 HC RX 637 (ALT 250 FOR IP): Performed by: NURSE PRACTITIONER

## 2024-10-05 PROCEDURE — 6360000002 HC RX W HCPCS: Performed by: NURSE PRACTITIONER

## 2024-10-05 PROCEDURE — 36415 COLL VENOUS BLD VENIPUNCTURE: CPT

## 2024-10-05 PROCEDURE — 6360000002 HC RX W HCPCS

## 2024-10-05 RX ORDER — DIPHENHYDRAMINE HYDROCHLORIDE 50 MG/ML
12.5 INJECTION INTRAMUSCULAR; INTRAVENOUS ONCE
Status: COMPLETED | OUTPATIENT
Start: 2024-10-05 | End: 2024-10-05

## 2024-10-05 RX ORDER — OXYCODONE HYDROCHLORIDE 5 MG/1
5 TABLET ORAL EVERY 6 HOURS PRN
Qty: 28 TABLET | Refills: 0 | Status: SHIPPED | OUTPATIENT
Start: 2024-10-05 | End: 2024-10-12

## 2024-10-05 RX ADMIN — DIPHENHYDRAMINE HYDROCHLORIDE 12.5 MG: 50 INJECTION INTRAMUSCULAR; INTRAVENOUS at 09:18

## 2024-10-05 RX ADMIN — ACETAMINOPHEN 1000 MG: 500 TABLET ORAL at 05:48

## 2024-10-05 RX ADMIN — RANOLAZINE 500 MG: 500 TABLET, FILM COATED, EXTENDED RELEASE ORAL at 09:03

## 2024-10-05 RX ADMIN — ENOXAPARIN SODIUM 30 MG: 100 INJECTION SUBCUTANEOUS at 09:04

## 2024-10-05 RX ADMIN — LEVOFLOXACIN 250 MG: 250 TABLET, FILM COATED ORAL at 09:04

## 2024-10-05 RX ADMIN — ACETAMINOPHEN 1000 MG: 500 TABLET ORAL at 15:41

## 2024-10-05 RX ADMIN — INSULIN LISPRO 4 UNITS: 100 INJECTION, SOLUTION INTRAVENOUS; SUBCUTANEOUS at 13:15

## 2024-10-05 RX ADMIN — BACITRACIN: 500 OINTMENT TOPICAL at 15:43

## 2024-10-05 RX ADMIN — SODIUM CHLORIDE, PRESERVATIVE FREE 10 ML: 5 INJECTION INTRAVENOUS at 09:04

## 2024-10-05 RX ADMIN — ATORVASTATIN CALCIUM 10 MG: 10 TABLET, FILM COATED ORAL at 09:03

## 2024-10-05 RX ADMIN — OXYCODONE 5 MG: 5 TABLET ORAL at 15:41

## 2024-10-05 RX ADMIN — BACITRACIN: 500 OINTMENT TOPICAL at 09:04

## 2024-10-05 RX ADMIN — PANTOPRAZOLE SODIUM 40 MG: 40 TABLET, DELAYED RELEASE ORAL at 05:48

## 2024-10-05 ASSESSMENT — PAIN DESCRIPTION - ORIENTATION: ORIENTATION: LEFT;RIGHT

## 2024-10-05 ASSESSMENT — PAIN SCALES - GENERAL
PAINLEVEL_OUTOF10: 7
PAINLEVEL_OUTOF10: 2
PAINLEVEL_OUTOF10: 5

## 2024-10-05 ASSESSMENT — PAIN DESCRIPTION - LOCATION: LOCATION: LEG
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
## 2024-10-05 NOTE — PLAN OF CARE
Problem: Pain  Goal: Verbalizes/displays adequate comfort level or baseline comfort level  10/5/2024 1328 by Danika Peterson, RN  Outcome: Progressing     Problem: Skin/Tissue Integrity  Goal: Absence of new skin breakdown  Description: 1.  Monitor for areas of redness and/or skin breakdown  10/5/2024 1328 by Danika Peterson, RN  Outcome: Progressing     Problem: Safety - Adult  Goal: Free from fall injury  10/5/2024 1328 by Danika Peterson, RN  Outcome: Progressing

## 2024-10-05 NOTE — DISCHARGE INSTR - COC
GASTROINTESTINAL ENDOSCOPY N/A 07/17/2018    retained thick secretions in proximal esophagus       Immunization History:   Immunization History   Administered Date(s) Administered    COVID-19, MODERNA BLUE border, Primary or Immunocompromised, (age 12y+), IM, 100 mcg/0.5mL 02/17/2021, 03/17/2021, 10/15/2021, 06/09/2022    COVID-19, MODERNA Bivalent, (age 12y+), IM, 50 mcg/0.5 mL 10/15/2021, 11/01/2022    COVID-19, MODERNA Booster BLUE border, (age 18y+), IM, 50mcg/0.25mL 10/15/2021    COVID-19, MODERNA, (age 12y+), IM, 50mcg/0.5mL 10/17/2023    COVID-19, [place] Vaccine, Vaccine Unspecified 06/09/2022    Influenza Virus Vaccine 12/17/2001, 11/05/2002, 10/19/2004, 10/18/2005, 10/24/2012, 09/08/2014, 10/06/2021, 10/20/2023    Influenza, FLUAD, (age 65 y+), IM, Quadv, 0.5mL 09/23/2021, 10/17/2022, 10/17/2023    Influenza, FLUAD, (age 65 y+), IM, Trivalent PF, 0.5mL 10/17/2018    Influenza, FLUZONE High Dose (age 65 y+), IM, Quadv, 0.7mL 09/15/2020    Influenza, FLUZONE High Dose, (age 65 y+), IM, Trivalent PF, 0.5mL 10/24/2012, 09/16/2013, 09/18/2014, 10/06/2015, 08/25/2016, 09/29/2017, 09/04/2019    Pneumococcal, PCV-13, PREVNAR 13, (age 6w+), IM, 0.5mL 01/09/2017    Pneumococcal, PPSV23, PNEUMOVAX 23, (age 2y+), SC/IM, 0.5mL 10/18/2005, 09/22/2011, 11/12/2018    RSV, AREXVY, (age 60y+), PF, IM, 0.5mL 10/17/2023    TDaP, ADACEL (age 10y-64y), BOOSTRIX (age 10y+), IM, 0.5mL 05/12/2022    Zoster Live (Zostavax) 08/20/2012    Zoster Recombinant (Shingrix) 11/27/2018, 12/08/2020    Zoster Vaccine 11/27/2018       Active Problems:  Patient Active Problem List   Diagnosis Code    TIA (transient ischemic attack) G45.9    Chronic back pain M54.9, G89.29    Diabetic peripheral neuropathy (HCC) E11.42    Macular degeneration of left eye H35.30    Constipation K59.00    Thrombocytopenia (HCC) D69.6    B12 deficiency E53.8    Vitamin D deficiency E55.9    Anemia D64.9    DDD (degenerative disc disease), cervical M50.30    Age-related  sent with patient):  Anette    RN SIGNATURE:  Electronically signed by Danika Peterson RN on 10/5/24 at 9:40 AM EDT    CASE MANAGEMENT/SOCIAL WORK SECTION    Inpatient Status Date: ***    Readmission Risk Assessment Score:  Readmission Risk              Risk of Unplanned Readmission:  37           Discharging to Facility/ Agency   Name: [facility]  Address:  Phone:  Fax:    Dialysis Facility (if applicable)   Name:  Address:  Dialysis Schedule:  Phone:  Fax:    / signature: Electronically signed by Jennifer Zuniga RN on 10/5/24 at 8:44 AM EDT    PHYSICIAN SECTION    Prognosis: Good    Condition at Discharge: Stable    Rehab Potential (if transferring to Rehab): Good    Recommended Labs or Other Treatments After Discharge: daily lab as indicated    Physician Certification: I certify the above information and transfer of Komal Crawford  is necessary for the continuing treatment of the diagnosis listed and that she requires Skilled Nursing Facility for less 30 days.     Update Admission H&P: No change in H&P    PHYSICIAN SIGNATURE:  Dr Miller

## 2024-10-05 NOTE — PLAN OF CARE
Problem: Chronic Conditions and Co-morbidities  Goal: Patient's chronic conditions and co-morbidity symptoms are monitored and maintained or improved  10/5/2024 0610 by Evy Barger RN  Outcome: Progressing  Flowsheets (Taken 10/4/2024 2000)  Care Plan - Patient's Chronic Conditions and Co-Morbidity Symptoms are Monitored and Maintained or Improved:   Monitor and assess patient's chronic conditions and comorbid symptoms for stability, deterioration, or improvement   Collaborate with multidisciplinary team to address chronic and comorbid conditions and prevent exacerbation or deterioration  10/4/2024 1836 by Leona Nash RN  Outcome: Progressing     Problem: Pain  Goal: Verbalizes/displays adequate comfort level or baseline comfort level  10/5/2024 0610 by Evy Barger RN  Outcome: Progressing  10/4/2024 1836 by Leona Nash RN  Outcome: Progressing     Problem: Skin/Tissue Integrity  Goal: Absence of new skin breakdown  Description: 1.  Monitor for areas of redness and/or skin breakdown  2.  Assess vascular access sites hourly  3.  Every 4-6 hours minimum:  Change oxygen saturation probe site  4.  Every 4-6 hours:  If on nasal continuous positive airway pressure, respiratory therapy assess nares and determine need for appliance change or resting period.  10/5/2024 0610 by Evy Barger RN  Outcome: Progressing  10/4/2024 1836 by Leona Nash RN  Outcome: Progressing     Problem: Safety - Adult  Goal: Free from fall injury  10/5/2024 0610 by Evy Barger RN  Outcome: Progressing  10/4/2024 1836 by Leona Nash RN  Outcome: Progressing     Problem: ABCDS Injury Assessment  Goal: Absence of physical injury  10/5/2024 0610 by Evy Barger RN  Outcome: Progressing  10/4/2024 1836 by Leona Nash RN  Outcome: Progressing     Problem: Nutrition Deficit:  Goal: Optimize nutritional status  10/5/2024 0610 by Evy Barger RN  Outcome: Progressing  10/4/2024 1836 by Leona Nash RN  Outcome: Progressing
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
## 2024-10-07 ENCOUNTER — CARE COORDINATION (OUTPATIENT)
Dept: CARE COORDINATION | Age: 84
End: 2024-10-07

## 2024-10-07 LAB — GLUCOSE BLD-MCNC: 177 MG/DL (ref 65–105)

## 2024-10-08 ENCOUNTER — OUTSIDE SERVICES (OUTPATIENT)
Dept: INFECTIOUS DISEASES | Age: 84
End: 2024-10-08
Payer: MEDICARE

## 2024-10-08 VITALS
OXYGEN SATURATION: 97 % | HEART RATE: 76 BPM | TEMPERATURE: 97.6 F | RESPIRATION RATE: 16 BRPM | DIASTOLIC BLOOD PRESSURE: 76 MMHG | SYSTOLIC BLOOD PRESSURE: 126 MMHG

## 2024-10-08 DIAGNOSIS — Z88.1 ALLERGY TO MULTIPLE ANTIBIOTICS: ICD-10-CM

## 2024-10-08 DIAGNOSIS — Z79.2 CHRONIC ANTIBIOTIC SUPPRESSION: Primary | ICD-10-CM

## 2024-10-08 PROCEDURE — 99309 SBSQ NF CARE MODERATE MDM 30: CPT | Performed by: NURSE PRACTITIONER
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
## 2024-10-09 ENCOUNTER — APPOINTMENT (OUTPATIENT)
Dept: PHYSICAL THERAPY | Age: 84
DRG: 563 | End: 2024-10-09
Payer: MEDICARE

## 2024-10-09 ENCOUNTER — CARE COORDINATION (OUTPATIENT)
Dept: CARE COORDINATION | Age: 84
End: 2024-10-09
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
## 2024-10-11 ENCOUNTER — APPOINTMENT (OUTPATIENT)
Dept: PHYSICAL THERAPY | Age: 84
DRG: 563 | End: 2024-10-11
Payer: MEDICARE

## 2024-10-16 ENCOUNTER — APPOINTMENT (OUTPATIENT)
Dept: PHYSICAL THERAPY | Age: 84
DRG: 563 | End: 2024-10-16
Payer: MEDICARE

## 2024-10-16 ENCOUNTER — CARE COORDINATION (OUTPATIENT)
Dept: CARE COORDINATION | Age: 84
End: 2024-10-16
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
## 2024-10-18 ENCOUNTER — HOSPITAL ENCOUNTER (OUTPATIENT)
Age: 84
Setting detail: SPECIMEN
Discharge: HOME | End: 2024-10-18

## 2024-10-18 ENCOUNTER — APPOINTMENT (OUTPATIENT)
Dept: PHYSICAL THERAPY | Age: 84
DRG: 563 | End: 2024-10-18
Payer: MEDICARE

## 2024-10-18 LAB
ANION GAP SERPL CALCULATED.3IONS-SCNC: 10 MMOL/L (ref 9–16)
BUN SERPL-MCNC: 22 MG/DL (ref 8–23)
CALCIUM SERPL-MCNC: 8.9 MG/DL (ref 8.6–10.4)
CHLORIDE SERPL-SCNC: 105 MMOL/L (ref 98–107)
CO2 SERPL-SCNC: 23 MMOL/L (ref 20–31)
CREAT SERPL-MCNC: 0.7 MG/DL (ref 0.5–0.9)
ERYTHROCYTE [DISTWIDTH] IN BLOOD BY AUTOMATED COUNT: 17.9 % (ref 11.8–14.4)
GFR, ESTIMATED: 86 ML/MIN/1.73M2
GLUCOSE SERPL-MCNC: 116 MG/DL (ref 74–99)
HCT VFR BLD AUTO: 28 % (ref 36.3–47.1)
HGB BLD-MCNC: 8.6 G/DL (ref 11.9–15.1)
MCH RBC QN AUTO: 28 PG (ref 25.2–33.5)
MCHC RBC AUTO-ENTMCNC: 30.7 G/DL (ref 28.4–34.8)
MCV RBC AUTO: 91.2 FL (ref 82.6–102.9)
NRBC BLD-RTO: 0 PER 100 WBC
PLATELET # BLD AUTO: ABNORMAL K/UL (ref 138–453)
PLATELET, FLUORESCENCE: 225 K/UL (ref 138–453)
PLATELETS.RETICULATED NFR BLD AUTO: 12.2 % (ref 1.1–10.3)
POTASSIUM SERPL-SCNC: 4 MMOL/L (ref 3.7–5.3)
RBC # BLD AUTO: 3.07 M/UL (ref 3.95–5.11)
SODIUM SERPL-SCNC: 138 MMOL/L (ref 136–145)
WBC OTHER # BLD: 6.5 K/UL (ref 3.5–11.3)

## 2024-10-18 PROCEDURE — 85055 RETICULATED PLATELET ASSAY: CPT

## 2024-10-18 PROCEDURE — 85027 COMPLETE CBC AUTOMATED: CPT

## 2024-10-18 PROCEDURE — 36415 COLL VENOUS BLD VENIPUNCTURE: CPT

## 2024-10-18 PROCEDURE — 80048 BASIC METABOLIC PNL TOTAL CA: CPT

## 2024-10-18 PROCEDURE — P9603 ONE-WAY ALLOW PRORATED MILES: HCPCS

## 2024-10-21 ENCOUNTER — OFFICE VISIT (OUTPATIENT)
Dept: ORTHOPEDIC SURGERY | Age: 84
End: 2024-10-21
Payer: MEDICARE

## 2024-10-21 VITALS — BODY MASS INDEX: 31.93 KG/M2 | HEIGHT: 57 IN | WEIGHT: 148 LBS

## 2024-10-21 DIAGNOSIS — R52 PAIN: Primary | ICD-10-CM

## 2024-10-21 PROCEDURE — G8400 PT W/DXA NO RESULTS DOC: HCPCS | Performed by: STUDENT IN AN ORGANIZED HEALTH CARE EDUCATION/TRAINING PROGRAM

## 2024-10-21 PROCEDURE — 1036F TOBACCO NON-USER: CPT | Performed by: STUDENT IN AN ORGANIZED HEALTH CARE EDUCATION/TRAINING PROGRAM

## 2024-10-21 PROCEDURE — 99213 OFFICE O/P EST LOW 20 MIN: CPT | Performed by: STUDENT IN AN ORGANIZED HEALTH CARE EDUCATION/TRAINING PROGRAM

## 2024-10-21 PROCEDURE — G8417 CALC BMI ABV UP PARAM F/U: HCPCS | Performed by: STUDENT IN AN ORGANIZED HEALTH CARE EDUCATION/TRAINING PROGRAM

## 2024-10-21 PROCEDURE — 1090F PRES/ABSN URINE INCON ASSESS: CPT | Performed by: STUDENT IN AN ORGANIZED HEALTH CARE EDUCATION/TRAINING PROGRAM

## 2024-10-21 PROCEDURE — 1111F DSCHRG MED/CURRENT MED MERGE: CPT | Performed by: STUDENT IN AN ORGANIZED HEALTH CARE EDUCATION/TRAINING PROGRAM

## 2024-10-21 PROCEDURE — G8427 DOCREV CUR MEDS BY ELIG CLIN: HCPCS | Performed by: STUDENT IN AN ORGANIZED HEALTH CARE EDUCATION/TRAINING PROGRAM

## 2024-10-21 PROCEDURE — G8484 FLU IMMUNIZE NO ADMIN: HCPCS | Performed by: STUDENT IN AN ORGANIZED HEALTH CARE EDUCATION/TRAINING PROGRAM

## 2024-10-21 PROCEDURE — 1123F ACP DISCUSS/DSCN MKR DOCD: CPT | Performed by: STUDENT IN AN ORGANIZED HEALTH CARE EDUCATION/TRAINING PROGRAM
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
## 2024-10-23 ENCOUNTER — CARE COORDINATION (OUTPATIENT)
Dept: CARE COORDINATION | Age: 84
End: 2024-10-23
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
## 2024-10-23 NOTE — CARE COORDINATION
Care Transitions Post-Acute Facility Update Call    10/23/2024    Patient: Komal Crawford Patient : 1940   MRN: [id number]  Reason for Admission:    Discharge Date: 10/5/24 RARS: Readmission Risk Score: 35.1       Care Transitions Post Acute Facility Update    Care Transitions Interventions    Physical Therapy: Completed Other Services: Completed    Occupational Therapy: Completed           Post Acute Facility Update   Post Acute Facility: [facility]    ELOS: 19 days      Nursing   Prescribed diet: Regular diet, Regular texture, Thin consistency    Wounds: Pos   Wound Location: lower leg ; surgical / cast   Skilled Medication therapies: PT / OT   Medical equipment being used: suprapubic catheter   Disease specific clinical considerations: DM   Reported Nursing Updates:            Rehab/Functional  Prior Living Description: Patient lives with spouse in a mobile home with ramped enterance. Patient reports having been MI with self care, transfers, amb with RW. Was able to do HMG. 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   Prior Level of Functioning: independent /driving   Cognitive function assessment: 15/15   ADLs: Minimal Assistance   Bed Mobility: Minimal Assistance   Transfer Assistance: Minimal Assistance   Ambulation Assistance: Moderate Assistance   How far (in feet) is the patient ambulating?: 0 - motorized wheelchair   Does patient use an assistive device?: Yes   Assistive Devices: Wheelchair / walker   Rehab Notes: Yvette  Upper body SBA; set up sitting on side of bed, lower body, max to dependent.   Hinged knee brace on left leg  Bed mobility min assist; using rails  Sit to stand with min assist maintain non weight bearing  Slide board transfer edge of bed to power wheelchair was CGA  Toileting bedpan max assist  Working on strengthening   PT - eval over weekend max assist sit to stand wheelchair  Maintain standing 30 secs  Lives with  in trailer hallways are  Subjective   HPI Comments: 89yo female pmh significant htn/DNR status presents ED via EMS s/p trip et fall from standing position striking occipital scalp/left shoulder while ambulating in bathroom.  Pt denies loc/headache/neck pain/chest pain/soa/abd pain/back pain.    Patient is a 89 y.o. female presenting with fall.   Fall   Mechanism of injury: fall    Injury location:  Head/neck and shoulder/arm  Head/neck injury location:  Scalp  Shoulder/arm injury location:  L shoulder  Incident location:  Nursing home  Associated symptoms: no back pain, no headaches and no neck pain        Review of Systems   Constitutional: Negative.    HENT: Negative.    Eyes: Negative.    Respiratory: Negative.    Cardiovascular: Negative.    Gastrointestinal: Negative.    Endocrine: Negative.    Genitourinary: Negative.    Musculoskeletal: Positive for arthralgias. Negative for back pain and neck pain.   Neurological: Negative for syncope and headaches.       Past Medical History:   Diagnosis Date   • Abrasion, knee     unspecified knee, initial encounter - minor   • Acquired hammer toe     other than great toe   • Acute bronchitis    • Age related cataract    • Artificial lens present     both eyes   • Artificial lens present    • Bunion    • Corns and callus    • Edema    • Essential hypertension    • Hallux valgus    • Hammer toe    • Ingrowing nail    • Leg pain, right    • Nasal congestion     ? due to eye drops   • Need for prophylactic vaccination against Streptococcus pneumoniae (pneumococcus) and influenza    • Neuropathy    • Nonexudative age-related macular degeneration    • Onychomycosis    • Primary open angle glaucoma of both eyes    • Ulcer of toe        Allergies   Allergen Reactions   • Lisinopril Anaphylaxis   • Alphagan P [Brimonidine Tartrate]    • Brimonidine    • Diamox Sequels [Acetazolamide]        Past Surgical History:   Procedure Laterality Date   • APPENDECTOMY  02/07/1998   • CATARACT EXTRACTION WITH  INTRAOCULAR LENS IMPLANT  02/13/2001   • INJECTION OF MEDICATION  03/30/2012    kenalog   • PROCEDURE GENERIC CONVERTED  04/08/2013    debride nail 6 or more   • PROCEDURE GENERIC CONVERTED  06/23/2015    paring corn/callus   • REFRACTIVE SURGERY  07/30/1987   • TOE TENDON REPAIR  03/22/2013    incision of toe tendon bottom of foot   • URETEROLITHOTOMY  06/28/1979       Family History   Problem Relation Age of Onset   • Heart disease Other        Social History     Social History   • Marital status:      Spouse name: N/A   • Number of children: N/A   • Years of education: N/A     Social History Main Topics   • Smoking status: Never Smoker   • Smokeless tobacco: Never Used   • Alcohol use No   • Drug use: No   • Sexual activity: Defer     Other Topics Concern   • None     Social History Narrative           Objective   Physical Exam   Constitutional: She is oriented to person, place, and time. She appears well-developed and well-nourished.   HENT:   Head: Normocephalic.       Right Ear: External ear normal.   Left Ear: External ear normal.   Nose: Nose normal.   Mouth/Throat: Oropharynx is clear and moist.   Eyes: Pupils are equal, round, and reactive to light.   Neck: Neck supple. No JVD present. No tracheal deviation present.   nontender c spine. Neg stepoff/deformity   Cardiovascular: Normal rate, regular rhythm, normal heart sounds and intact distal pulses.  Exam reveals no gallop and no friction rub.    No murmur heard.  Pulmonary/Chest: Effort normal and breath sounds normal. She has no wheezes. She has no rales.   Abdominal: Soft. Bowel sounds are normal. There is no tenderness. There is no rebound and no guarding.   Musculoskeletal:        Left shoulder: She exhibits tenderness and deformity. She exhibits no bony tenderness, no swelling, no effusion, no crepitus and no laceration.   Lymphadenopathy:     She has no cervical adenopathy.   Neurological: She is alert and oriented to person, place, and time.  GCS eye subscore is 4. GCS verbal subscore is 5. GCS motor subscore is 6.   Skin: Skin is warm and dry.   Nursing note and vitals reviewed.      ECG 12 Lead    Date/Time: 1/17/2018 5:51 PM  Performed by: BULL FERNANDEZ  Authorized by: BULL FERNANDEZ   Interpreted by physician  Rhythm: sinus rhythm  Rate: normal  BPM: 74  QRS axis: left  Conduction: left bundle branch block  ST Segments: ST segments normal  T depression: aVL  Other findings: PRWP  Clinical impression: abnormal ECG      Laceration Repair  Date/Time: 1/17/2018 7:01 PM  Performed by: BULL FERNANDEZ  Authorized by: BULL FERNANDEZ     Consent:     Consent obtained:  Verbal  Anesthesia (see MAR for exact dosages):     Anesthesia method:  Local infiltration    Local anesthetic:  Lidocaine 1% w/o epi  Laceration details:     Location:  Scalp    Scalp location:  Occipital    Length (cm):  2.5  Repair type:     Repair type:  Simple  Pre-procedure details:     Preparation:  Imaging obtained to evaluate for foreign bodies  Exploration:     Wound exploration: entire depth of wound probed and visualized      Contaminated: no    Treatment:     Area cleansed with:  Hibiclens and saline    Amount of cleaning:  Standard  Skin repair:     Repair method:  Staples    Number of staples:  4  Approximation:     Approximation:  Close    Vermilion border: well-aligned    Post-procedure details:     Dressing:  Antibiotic ointment    Patient tolerance of procedure:  Tolerated well, no immediate complications             ED Course  ED Course      Labs Reviewed   COMPREHENSIVE METABOLIC PANEL - Abnormal; Notable for the following:        Result Value    Glucose 134 (*)     BUN 42 (*)     Creatinine 1.58 (*)     eGFR   Amer 36 (*)     BUN/Creatinine Ratio 26.6 (*)     All other components within normal limits    Narrative:     The MDRD GFR formula is only valid for adults with stable renal function between ages 18 and 70.   CBC WITH AUTO DIFFERENTIAL - Abnormal; Notable for  the following:     Hemoglobin 11.1 (*)     Hematocrit 33.1 (*)     All other components within normal limits   PROTIME-INR - Normal    Narrative:     Therapeutic range for most indications is 2.0-3.0 INR,  or 2.5-3.5 for mechanical heart valves.   APTT - Normal    Narrative:     The recommended Heparin therapeutic range is 68-97 seconds.   URINALYSIS W/ CULTURE IF INDICATED   CBC AND DIFFERENTIAL    Narrative:     The following orders were created for panel order CBC & Differential.  Procedure                               Abnormality         Status                     ---------                               -----------         ------                     CBC Auto Differential[[id number]]        Abnormal            Final result                 Please view results for these tests on the individual orders.   EXTRA TUBES    Narrative:     The following orders were created for panel order Extra Tubes.  Procedure                               Abnormality         Status                     ---------                               -----------         ------                     Gold Top - SST[[id number]]                                   Final result                 Please view results for these tests on the individual orders.   GOLD TOP - SST     Xr Shoulder 2+ View Left    Result Date: 1/17/2018  Narrative: Three view left shoulder HISTORY: Pain after falling AP films with the humerus in internal and external rotation and scapular Y view were obtained. COMPARISON: None No fracture or dislocation. 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 Degenerative changes in the thoracic spine. Old granulomatous disease in the chest.     Impression: CONCLUSION: No fracture or dislocation. 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  [id number] Electronically signed by:  Kevin Hernanedz MD  1/17/2018 6:09 PM CST Workstation: [id number]    Xr Humerus Left    Result Date: 1/17/2018  Narrative: Two view left humerus HISTORY: Pain after falling AP and lateral views obtained. COMPARISON: None No fracture or dislocation. 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     Impression: CONCLUSION: No fracture or dislocation [id number] Electronically signed by:  Kevin Hernandez MD  1/17/2018 6:11 PM CST Workstation: [id number]    Ct Head Without Contrast    Result Date: 1/17/2018  Narrative: EXAM DESCRIPTION: CT HEAD WO CONTRAST CLINICAL HISTORY:  head injury   COMPARISON: No studies available for comparison. Report of exam 5/22/2007 is reviewed. DOSE LENGTH PRODUCT: 971.10 CONTRAST: None TECHNIQUE:  Axial images from skull base to vertex.  This exam was performed according to our departmental dose optimization program, which includes automated exposure control, adjustment of the mA and/or KV according to patient size and/or use of iterative reconstruction technique. FINDINGS: No Chiari malformation. Extra-axial spaces: Appropriate for patient age and degree of volume loss.  Intracranial hemorrhage: No evidence of intracranial hemorrhage or suspicious extra-axial fluid collections. Ventricular system: No hydrocephalus. Basal cisterns: Patent. Cerebral parenchyma: No edema, midline shift, or mass effect. Gray-white differentiation is maintained. There is mild-moderate volume loss with associated change. There is sequela chronic microvascular disease.. She reported symmetric benign calcification is again seen within the basal ganglia and the cerebellar hemispheres.  Midline shift: None.  Cerebellum: No acute or suspicious finding. Brainstem : Unremarkable CT appearance. Calvarium: No evidence of calvarial fracture.  Vascular system: Vascular calcification,  otherwise unremarkable noncontrast appearance.  Paranasal sinuses and mastoid air cells: No air-fluid levels or significant mucosal thickening within the included sinuses. The mastoids and middle ear cavities are clear.  Visualized orbits: No acute findings.  Visualized upper cervical spine: Not included. Sella: Unremarkable.  Skull base: Unremarkable. ADDITIONAL FINDINGS: There is a left posterior parietal scalp contusion/hematoma.     Impression: Left parietal scalp injury without underlying calvarial fracture, intracranial hemorrhage, mass effect, or acute large vessel distribution infarct. Mild-moderate volume loss and sequela chronic microvascular disease. Benign cerebral and cerebellar mineralization. The latter finding was reported on the prior exam from 2017. Electronically signed by:  Ryder Marcano MD  1/17/2018 6:40 PM CST Workstation: [id number]    Ct Cervical Spine Without Contrast    Result Date: 1/17/2018  Narrative: EXAM DESCRIPTION: CT CERVICAL SPINE WO CONTRAST CLINICAL HISTORY: head injury COMPARISON: None TECHNIQUE: Multiple contiguous noncontrast axial images are obtained of the cervical spine. Coronal and sagittal multiplanar reformatted images are also reviewed. This exam was performed according to our departmental dose optimization program, which includes automated exposure control, adjustment of the mA and/or KV according to patient size and/or use of iterative reconstruction technique. DLP: 971.10 FINDINGS: No evidence of prevertebral thickening or suspicious fluid collection. The included lung apices are unremarkable for acute finding. The included skull base, mastoids and paranasal sinuses are unremarkable. There is incidental note made of large torus palatini. There is generalized demineralization. There is approximate 2 mm degenerative anterolisthesis of C3 upon C4 and retrolisthesis of C5 upon C6. Otherwise there is straightening and reversal of normal curvature with anatomic alignment.  No evidence of compression fracture deformity. The craniocervical articulations are intact. There is arthritic changes at C1-C2 however no widening of the atlantodental interval. The lateral masses are appropriately positioned. No fracture of the dens identified. Normal alignment of the posterior facets. No fracture or perched facet. Arthritic changes are present greater on the left. Multilevel disc space narrowing, greatest at C4-5 and C5-6. C4-5: There is mild disc osteophyte complex with left greater than right uncovertebral posterior facet hypertrophy. There is severe left and moderate right foraminal stenosis. C5-6: There is disc osteophyte complex contributing to mild central spinal canal stenosis. Uncovertebral and posterior facet hypertrophy contribute to bilateral severe foraminal stenosis. C6-7: Uncovertebral and posterior facet hypertrophy on the left contributing to moderate foraminal stenosis.     Impression: No acute fracture or subluxation of the cervical spine. Demineralization and multilevel degenerative changes of the cervical spine as detailed above. Electronically signed by:  Ryder Marcano MD  1/17/2018 6:47 PM CST Workstation: [id number]    Xr Chest 1 View    Result Date: 1/17/2018  Narrative: PROCEDURE: XR CHEST 1 VW VIEWS:Single INDICATION: Dyspnea COMPARISON: Acute abdominal series: 1/24/2017 FINDINGS:   - lines/tubes: None   - cardiac: Borderline size   - mediastinum: Atherosclerotic calcification and aortic tortuosity.   - lungs: No evidence of a focal air space process, pulmonary interstitial edema, nodule(s)/mass.   - pleura: No evidence of  fluid.    - osseous: Degenerative changes of the bilateral shoulders are stable.     Impression: No acute cardiopulmonary process identified. Electronically signed by:  Katherine Ashford MD  1/17/2018 6:21 PM CST Workstation: [id number]    Xr Pelvis 1 Or 2 View    Result Date: 1/17/2018  Narrative: PROCEDURE: XR PELVIS 1 OR 2 VW VIEWS: 1 INDICATION: Pain  COMPARISON: None FINDINGS:   - fracture: None   - alignment: Within normal limits   - misc: Age-related degenerative changes. Diffusely decreased osseous mineralization. Degenerative changes are present in lower lumbar spine. Irregular calcifications in the pelvis probably represents a fibroid.     Impression: No acute osseous abnormality identified.. Note:  if pain or symptoms persist beyond reasonable expectations and follow-up imaging is anticipated,  cross sectional imaging  (CT and/or MRI) is suggested, as is deemed clinically appropriate. Electronically signed by:  Katherine Ashford MD  1/17/2018 6:19 PM [facility] Workstation: [id number]                [facility]    Final diagnoses:   Laceration of scalp, initial encounter   Osteoarthritis of left shoulder, unspecified osteoarthritis type   Chronic kidney disease, unspecified CKD stage   Dehydration   Left bundle branch block            Cameron Foster MD  01/17/18 1907       Cameron Foster MD  01/17/18 [id number]

## 2024-10-25 ENCOUNTER — HOSPITAL ENCOUNTER (OUTPATIENT)
Age: 84
Setting detail: SPECIMEN
Discharge: HOME | End: 2024-10-25

## 2024-10-26 DIAGNOSIS — Z86.73 HISTORY OF STROKE: ICD-10-CM

## 2024-10-27 PROBLEM — N39.0 UTI (URINARY TRACT INFECTION): Status: RESOLVED | Noted: 2024-09-27 | Resolved: 2024-10-27

## 2024-10-28 RX ORDER — ATORVASTATIN CALCIUM 10 MG/1
10 TABLET, FILM COATED ORAL DAILY
Qty: 30 TABLET | Refills: 2 | Status: SHIPPED | OUTPATIENT
Start: 2024-10-28

## 2024-10-30 ENCOUNTER — CARE COORDINATION (OUTPATIENT)
Dept: CARE COORDINATION | Age: 84
End: 2024-10-30

## 2024-10-30 NOTE — CARE COORDINATION
Care Transitions Post-Acute Facility Update Call    10/30/2024    Patient: Komal Crawford Patient : 1940   MRN: [id number]  Reason for Admission:    Discharge Date: 10/5/24 RARS: Readmission Risk Score: 35.1       Care Transitions Post Acute Facility Update    Care Transitions Interventions    Physical Therapy: Completed Other Services: Completed    Occupational Therapy: Completed           Post Acute Facility Update   Post Acute Facility: [facility]    ELOS: 19 days      Nursing  VS /70 P 83 R T 97.5 SP02 98% .3LBS PAIN 0   Prescribed diet: Regular diet, Regular texture, Thin consistency    Wounds: Pos  Wound #1 current as of 10/05/2024. Wound type - Pressure ulcer/injury Stage 3 coccyx . Measurement in centimeters: 1.0 x 4.0 x 0.1 Wound base is partially visible with 50 % granulation tissue present, 50 % slough/necrosis present, with no drainage. Mechanical debridement done. Wound #1 has improved    Wound Location: lower leg ; surgical / cast   Skilled Medication therapies: PT / OT   Medical equipment being used: suprapubic catheter   Disease specific clinical considerations: DM   Reported Nursing Updates:            Rehab/Functional  Late loss ADL: Independent in bed mobility. Limited assist of 1 in transfers. Extensive assist of 1 in toilet use. Independent in eating.     Prior Living Description: Patient lives with spouse in a mobile home with ramped enterance. Patient reports having been MI with self care, transfers, amb with RW. Was able to do HMG. 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   Prior Level of Functioning: independent /driving   Cognitive function assessment: 15/15   ADLs: Minimal Assistance   Bed Mobility: Minimal Assistance   Transfer Assistance: Minimal Assistance   Ambulation Assistance: Moderate Assistance   How far (in feet) is the patient ambulating?: 0 - motorized wheelchair   Does patient use an assistive device?: Yes   Assistive Devices:

## 2024-10-31 ENCOUNTER — HOSPITAL ENCOUNTER (OUTPATIENT)
Age: 84
Setting detail: SPECIMEN
Discharge: HOME | End: 2024-10-31

## 2024-10-31 LAB
AMORPH SED URNS QL MICRO: ABNORMAL
BACTERIA URNS QL MICRO: ABNORMAL
BILIRUB UR QL STRIP: ABNORMAL
CLARITY UR: ABNORMAL
COLOR UR: ABNORMAL
EPI CELLS #/AREA URNS HPF: ABNORMAL /HPF (ref 0–5)
GLUCOSE UR STRIP-MCNC: NEGATIVE MG/DL
HGB UR QL STRIP.AUTO: ABNORMAL
KETONES UR STRIP-MCNC: NEGATIVE MG/DL
LEUKOCYTE ESTERASE UR QL STRIP: ABNORMAL
NITRITE UR QL STRIP: NEGATIVE
PH UR STRIP: 8 [PH] (ref 5–8)
PROT UR STRIP-MCNC: ABNORMAL MG/DL
RBC #/AREA URNS HPF: ABNORMAL /HPF (ref 0–2)
SP GR UR STRIP: 1.02 (ref 1–1.03)
UROBILINOGEN UR STRIP-ACNC: NORMAL EU/DL (ref 0–1)
WBC #/AREA URNS HPF: ABNORMAL /HPF (ref 0–5)

## 2024-10-31 PROCEDURE — 81001 URINALYSIS AUTO W/SCOPE: CPT

## 2024-11-01 ENCOUNTER — HOSPITAL ENCOUNTER (OUTPATIENT)
Age: 84
Setting detail: SPECIMEN
Discharge: HOME | End: 2024-11-01

## 2024-11-01 LAB
ANION GAP SERPL CALCULATED.3IONS-SCNC: 10 MMOL/L (ref 9–16)
BUN SERPL-MCNC: 31 MG/DL (ref 8–23)
CALCIUM SERPL-MCNC: 9.2 MG/DL (ref 8.6–10.4)
CHLORIDE SERPL-SCNC: 106 MMOL/L (ref 98–107)
CO2 SERPL-SCNC: 24 MMOL/L (ref 20–31)
CREAT SERPL-MCNC: 0.8 MG/DL (ref 0.6–0.9)
ERYTHROCYTE [DISTWIDTH] IN BLOOD BY AUTOMATED COUNT: 17.1 % (ref 11.8–14.4)
GFR, ESTIMATED: 73 ML/MIN/1.73M2
GLUCOSE SERPL-MCNC: 102 MG/DL (ref 74–99)
HCT VFR BLD AUTO: 31.5 % (ref 36.3–47.1)
HGB BLD-MCNC: 9.5 G/DL (ref 11.9–15.1)
MCH RBC QN AUTO: 27.9 PG (ref 25.2–33.5)
MCHC RBC AUTO-ENTMCNC: 30.2 G/DL (ref 28.4–34.8)
MCV RBC AUTO: 92.4 FL (ref 82.6–102.9)
NRBC BLD-RTO: 0 PER 100 WBC
PLATELET # BLD AUTO: ABNORMAL K/UL (ref 138–453)
PLATELET, FLUORESCENCE: 150 K/UL (ref 138–453)
PLATELETS.RETICULATED NFR BLD AUTO: 16 % (ref 1.1–10.3)
POTASSIUM SERPL-SCNC: 3.9 MMOL/L (ref 3.7–5.3)
RBC # BLD AUTO: 3.41 M/UL (ref 3.95–5.11)
SODIUM SERPL-SCNC: 140 MMOL/L (ref 136–145)
WBC OTHER # BLD: 8.2 K/UL (ref 3.5–11.3)

## 2024-11-01 PROCEDURE — P9603 ONE-WAY ALLOW PRORATED MILES: HCPCS

## 2024-11-01 PROCEDURE — 85055 RETICULATED PLATELET ASSAY: CPT

## 2024-11-01 PROCEDURE — 85027 COMPLETE CBC AUTOMATED: CPT

## 2024-11-01 PROCEDURE — 80048 BASIC METABOLIC PNL TOTAL CA: CPT

## 2024-11-01 PROCEDURE — 36415 COLL VENOUS BLD VENIPUNCTURE: CPT

## 2024-11-02 PROBLEM — Z01.818 PRE-OP EVALUATION: Status: RESOLVED | Noted: 2024-10-03 | Resolved: 2024-11-02

## 2024-11-06 ENCOUNTER — CARE COORDINATION (OUTPATIENT)
Dept: CARE COORDINATION | Age: 84
End: 2024-11-06

## 2024-11-06 NOTE — CARE COORDINATION
Care Transitions Post-Acute Facility Update Call    2024    Patient: Komal Crawford Patient : 1940   MRN: [id number]  Reason for Admission:    Discharge Date: 10/5/24 RARS: Readmission Risk Score: 35.1       Care Transitions Post Acute Facility Update    Care Transitions Interventions    Physical Therapy: Completed Other Services: Completed    Occupational Therapy: Completed           Post Acute Facility Update   Post Acute Facility: [facility]    ELOS: 19 days      Nursing  VS /72 - 2024 16:58 Position: Lying r/arm P 83 - 2024 16:58 PulseType: Regular  Temp: T 97.8 - 2024 16:59 Route: Tympanic  Respiratory: R 18 - 2024 16:59 , regular. Lung sounds clear. O2 100 % - 2024 16:59 Method: Room Air    Prescribed diet: Regular diet, Regular texture, Thin consistency    Wounds: Pos  Wound #1 current as of 10/05/2024. Wound type - Pressure ulcer/injury Stage 3 coccyx . Measurement in centimeters: 1.0 x 4.0 x 0.1 Wound base is partially visible with 50 % granulation tissue present, 50 % slough/necrosis present, with no drainage. Mechanical debridement done. Wound #1 has improved    Wound Location: lower leg ; surgical / cast   Skilled Medication therapies: PT / OT   Medical equipment being used: suprapubic catheter   Disease specific clinical considerations: DM   Reported Nursing Updates:            Rehab/Functional  Independent in bed mobility. Limited assist of 1 in transfers. Limited assist of 1 in toilet use. Independent in eating.  Skin condition/skin color: other skin alteration, see weekly wound assessment, WNL coccyx     Prior Living Description: Patient lives with spouse in a mobile home with ramped enterance. Patient reports having been MI with self care, transfers, amb with RW. Was able to do HMG. 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   Prior Level of Functioning: independent /driving   Cognitive function assessment: 15/15   ADLs: Minimal

## 2024-11-07 ENCOUNTER — OFFICE VISIT (OUTPATIENT)
Age: 84
End: 2024-11-07

## 2024-11-07 DIAGNOSIS — N31.9 NEUROGENIC BLADDER: Primary | ICD-10-CM

## 2024-11-07 DIAGNOSIS — R33.9 URINARY RETENTION: ICD-10-CM

## 2024-11-07 DIAGNOSIS — N32.89 BLADDER SPASMS: ICD-10-CM

## 2024-11-07 RX ORDER — SOLIFENACIN SUCCINATE 5 MG/1
5 TABLET, FILM COATED ORAL DAILY
Qty: 90 TABLET | Refills: 1 | Status: SHIPPED | OUTPATIENT
Start: 2024-11-07

## 2024-11-07 RX ORDER — ERGOCALCIFEROL 1.25 MG/1
50000 CAPSULE ORAL WEEKLY
COMMUNITY

## 2024-11-07 RX ORDER — DICYCLOMINE HYDROCHLORIDE 10 MG/1
10 CAPSULE ORAL PRN
COMMUNITY

## 2024-11-07 RX ORDER — GABAPENTIN 100 MG/1
100 CAPSULE ORAL 3 TIMES DAILY
COMMUNITY

## 2024-11-07 NOTE — PROGRESS NOTES
Date    CREATININE 0.8 2024       Last CBC:  Lab Results   Component Value Date    WBC 8.2 2024    HGB 9.5 (L) 2024    HCT 31.5 (L) 2024    MCV 92.4 2024    PLT See Reflexed IPF Result 2024       Additional Lab/Culture results: none    Imaging Reviewed during this Office Visit: none  (results were independently reviewed by physician and radiology report verified)    Physical Exam:    There were no vitals filed for this visit.  There is no height or weight on file to calculate BMI.  Patient is a 84 y.o. female in no acute distress and alert and oriented to person, place and time.    Assessment and Plan   Assessment   1. Neurogenic bladder    2. Urinary retention    3. Bladder spasms            Plan    Return in about 4 weeks (around 2024) for SP tube change.  Patient given an Rx for Solifenacin (Vesicare) 5 mg po qd for bladder spasms.       Omega Lawson MD [facility]  2024 10:33 AM     Quality Measure Documentation: N/A  Social History     Tobacco Use   Smoking Status Former    Current packs/day: 0.00    Average packs/day: 0.5 packs/day for 30.0 years (15.0 ttl pk-yrs)    Types: Cigarettes    Start date: 1952    Quit date: 1982    Years since quittin.4    Passive exposure: Past   Smokeless Tobacco Former    Quit date: 1982     (If patient a smoker, smoking cessation counseling offered)

## 2024-11-08 ENCOUNTER — HOSPITAL ENCOUNTER (OUTPATIENT)
Age: 84
Setting detail: SPECIMEN
Discharge: HOME | End: 2024-11-08

## 2024-11-08 ENCOUNTER — CARE COORDINATION (OUTPATIENT)
Dept: CARE COORDINATION | Age: 84
End: 2024-11-08

## 2024-11-08 LAB
ANION GAP SERPL CALCULATED.3IONS-SCNC: 11 MMOL/L (ref 9–16)
BASOPHILS # BLD: 0.09 K/UL (ref 0–0.2)
BASOPHILS NFR BLD: 1 % (ref 0–2)
BUN SERPL-MCNC: 27 MG/DL (ref 8–23)
CALCIUM SERPL-MCNC: 8.7 MG/DL (ref 8.6–10.4)
CHLORIDE SERPL-SCNC: 107 MMOL/L (ref 98–107)
CO2 SERPL-SCNC: 22 MMOL/L (ref 20–31)
CREAT SERPL-MCNC: 0.7 MG/DL (ref 0.6–0.9)
EOSINOPHIL # BLD: 0.26 K/UL (ref 0–0.44)
EOSINOPHILS RELATIVE PERCENT: 3 % (ref 1–4)
ERYTHROCYTE [DISTWIDTH] IN BLOOD BY AUTOMATED COUNT: 16.8 % (ref 11.8–14.4)
GFR, ESTIMATED: 86 ML/MIN/1.73M2
GLUCOSE SERPL-MCNC: 128 MG/DL (ref 74–99)
HCT VFR BLD AUTO: 31.1 % (ref 36.3–47.1)
HGB BLD-MCNC: 9.6 G/DL (ref 11.9–15.1)
IMM GRANULOCYTES # BLD AUTO: 0.05 K/UL (ref 0–0.3)
IMM GRANULOCYTES NFR BLD: 1 %
LYMPHOCYTES NFR BLD: 1.63 K/UL (ref 1.1–3.7)
LYMPHOCYTES RELATIVE PERCENT: 18 % (ref 24–43)
MCH RBC QN AUTO: 28.5 PG (ref 25.2–33.5)
MCHC RBC AUTO-ENTMCNC: 30.9 G/DL (ref 28.4–34.8)
MCV RBC AUTO: 92.3 FL (ref 82.6–102.9)
MONOCYTES NFR BLD: 0.71 K/UL (ref 0.1–1.2)
MONOCYTES NFR BLD: 8 % (ref 3–12)
NEUTROPHILS NFR BLD: 70 % (ref 36–65)
NEUTS SEG NFR BLD: 6.32 K/UL (ref 1.5–8.1)
NRBC BLD-RTO: 0 PER 100 WBC
PLATELET # BLD AUTO: ABNORMAL K/UL (ref 138–453)
PLATELET, FLUORESCENCE: 164 K/UL (ref 138–453)
PLATELETS.RETICULATED NFR BLD AUTO: 16.6 % (ref 1.1–10.3)
POTASSIUM SERPL-SCNC: 4.1 MMOL/L (ref 3.7–5.3)
RBC # BLD AUTO: 3.37 M/UL (ref 3.95–5.11)
RBC # BLD: ABNORMAL 10*6/UL
SODIUM SERPL-SCNC: 140 MMOL/L (ref 136–145)
WBC OTHER # BLD: 9.1 K/UL (ref 3.5–11.3)

## 2024-11-08 PROCEDURE — 85055 RETICULATED PLATELET ASSAY: CPT

## 2024-11-08 PROCEDURE — 36415 COLL VENOUS BLD VENIPUNCTURE: CPT

## 2024-11-08 PROCEDURE — P9603 ONE-WAY ALLOW PRORATED MILES: HCPCS

## 2024-11-08 PROCEDURE — 80048 BASIC METABOLIC PNL TOTAL CA: CPT

## 2024-11-08 PROCEDURE — 85025 COMPLETE CBC W/AUTO DIFF WBC: CPT
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
## 2024-11-13 ENCOUNTER — CARE COORDINATION (OUTPATIENT)
Dept: CARE COORDINATION | Age: 84
End: 2024-11-13

## 2024-11-13 NOTE — CARE COORDINATION
Care Transitions Post-Acute Facility Update Call    2024    Patient: Komal Crawford Patient : 1940   MRN: [id number]  Reason for Admission:  Left ankle fracture ORIF  Discharge Date: 10/5/24 RARS: Readmission Risk Score: 35.1    OT standpoint set up for upper stand by for lower body, aqua cover for cast, toilet transfers, min assist, sit to stand with min assist with rolling walker, pivots are max to mod not walking, goes back to ortho on   8 weeks nwb      Care Transitions Post Acute Facility Update    Care Transitions Interventions    Physical Therapy: Completed Other Services: Completed    Occupational Therapy: Completed           Post Acute Facility Update   Post Acute Facility: [facility]    ELOS: 19 days      Nursing     Prescribed diet: Regular diet, Regular texture, Thin consistency    Wounds: Pos    Wound Location: lower leg ; surgical / cast   Skilled Medication therapies: PT / OT   Medical equipment being used: suprapubic catheter   Disease specific clinical considerations: DM, Winters esophagus, benign tumor of spinal cord, cerebral artery occlusion wih cerebral infarction, chronic back pain, chronic idiopathic thrombocytopenia, CVA, diabetic gastroparesis, GERD, diverticular disease, hiatal hernia, hip fracture, HLD, HTN, Mac degeneration left eye, neuropathy, OA, neurogenic bladder-self cath, TIA Tubular adenoma   LTHA 24, LTKA revision with OSS 24,    Reported Nursing Updates:      /72 - 2024 18:05 Position: Sitting l/arm P 68 - 2024 18:05 Pulse Type: Regular  Temp: T 97.7 - 2024 13:44 Route: Forehead (non-contact)  Respiratory: R 17.0 - 2024 18:50 , regular. Lung sounds clear. O2 94.0 % - 2024 18:06 Method: Room Air  Health Status: None of the above indicators, None of the above indicators, None of the above indicators, None  Food/fluid intake: 51-75%        Rehab/Functional  Late loss ADL: Extensive assist of 1 in bed mobility. Extensive

## 2024-11-15 ENCOUNTER — HOSPITAL ENCOUNTER (OUTPATIENT)
Age: 84
Setting detail: SPECIMEN
Discharge: HOME | End: 2024-11-15

## 2024-11-15 LAB
ANION GAP SERPL CALCULATED.3IONS-SCNC: 10 MMOL/L (ref 9–16)
BASOPHILS # BLD: 0.08 K/UL (ref 0–0.2)
BASOPHILS NFR BLD: 1 % (ref 0–2)
BUN SERPL-MCNC: 23 MG/DL (ref 8–23)
CALCIUM SERPL-MCNC: 9.2 MG/DL (ref 8.6–10.4)
CHLORIDE SERPL-SCNC: 102 MMOL/L (ref 98–107)
CO2 SERPL-SCNC: 26 MMOL/L (ref 20–31)
CREAT SERPL-MCNC: 0.7 MG/DL (ref 0.6–0.9)
EOSINOPHIL # BLD: 0.42 K/UL (ref 0–0.44)
EOSINOPHILS RELATIVE PERCENT: 7 % (ref 1–4)
ERYTHROCYTE [DISTWIDTH] IN BLOOD BY AUTOMATED COUNT: 16.3 % (ref 11.8–14.4)
GFR, ESTIMATED: 86 ML/MIN/1.73M2
GLUCOSE SERPL-MCNC: 121 MG/DL (ref 74–99)
HCT VFR BLD AUTO: 34.4 % (ref 36.3–47.1)
HGB BLD-MCNC: 10.6 G/DL (ref 11.9–15.1)
IMM GRANULOCYTES # BLD AUTO: 0.05 K/UL (ref 0–0.3)
IMM GRANULOCYTES NFR BLD: 1 %
LYMPHOCYTES NFR BLD: 1.56 K/UL (ref 1.1–3.7)
LYMPHOCYTES RELATIVE PERCENT: 27 % (ref 24–43)
MCH RBC QN AUTO: 28 PG (ref 25.2–33.5)
MCHC RBC AUTO-ENTMCNC: 30.8 G/DL (ref 28.4–34.8)
MCV RBC AUTO: 91 FL (ref 82.6–102.9)
MONOCYTES NFR BLD: 0.65 K/UL (ref 0.1–1.2)
MONOCYTES NFR BLD: 11 % (ref 3–12)
NEUTROPHILS NFR BLD: 53 % (ref 36–65)
NEUTS SEG NFR BLD: 3.11 K/UL (ref 1.5–8.1)
NRBC BLD-RTO: 0 PER 100 WBC
PLATELET # BLD AUTO: ABNORMAL K/UL (ref 138–453)
PLATELET, FLUORESCENCE: 162 K/UL (ref 138–453)
PLATELETS.RETICULATED NFR BLD AUTO: 15.1 % (ref 1.1–10.3)
POTASSIUM SERPL-SCNC: 3.9 MMOL/L (ref 3.7–5.3)
RBC # BLD AUTO: 3.78 M/UL (ref 3.95–5.11)
RBC # BLD: ABNORMAL 10*6/UL
SODIUM SERPL-SCNC: 138 MMOL/L (ref 136–145)
WBC OTHER # BLD: 5.9 K/UL (ref 3.5–11.3)

## 2024-11-15 PROCEDURE — 36415 COLL VENOUS BLD VENIPUNCTURE: CPT

## 2024-11-15 PROCEDURE — P9603 ONE-WAY ALLOW PRORATED MILES: HCPCS

## 2024-11-15 PROCEDURE — 85055 RETICULATED PLATELET ASSAY: CPT

## 2024-11-15 PROCEDURE — 80048 BASIC METABOLIC PNL TOTAL CA: CPT

## 2024-11-15 PROCEDURE — 85025 COMPLETE CBC W/AUTO DIFF WBC: CPT

## 2024-11-18 ENCOUNTER — OFFICE VISIT (OUTPATIENT)
Dept: ORTHOPEDIC SURGERY | Age: 84
End: 2024-11-18
Payer: MEDICARE

## 2024-11-18 VITALS — HEIGHT: 57 IN | WEIGHT: 148 LBS | BODY MASS INDEX: 31.93 KG/M2

## 2024-11-18 DIAGNOSIS — R52 PAIN: Primary | ICD-10-CM

## 2024-11-18 PROCEDURE — 1159F MED LIST DOCD IN RCRD: CPT | Performed by: PHYSICIAN ASSISTANT

## 2024-11-18 PROCEDURE — 1125F AMNT PAIN NOTED PAIN PRSNT: CPT | Performed by: PHYSICIAN ASSISTANT

## 2024-11-18 PROCEDURE — G8400 PT W/DXA NO RESULTS DOC: HCPCS | Performed by: PHYSICIAN ASSISTANT

## 2024-11-18 PROCEDURE — G8484 FLU IMMUNIZE NO ADMIN: HCPCS | Performed by: PHYSICIAN ASSISTANT

## 2024-11-18 PROCEDURE — G8427 DOCREV CUR MEDS BY ELIG CLIN: HCPCS | Performed by: PHYSICIAN ASSISTANT

## 2024-11-18 PROCEDURE — G8417 CALC BMI ABV UP PARAM F/U: HCPCS | Performed by: PHYSICIAN ASSISTANT

## 2024-11-18 PROCEDURE — 1123F ACP DISCUSS/DSCN MKR DOCD: CPT | Performed by: PHYSICIAN ASSISTANT

## 2024-11-18 PROCEDURE — 99213 OFFICE O/P EST LOW 20 MIN: CPT | Performed by: PHYSICIAN ASSISTANT

## 2024-11-18 PROCEDURE — 1090F PRES/ABSN URINE INCON ASSESS: CPT | Performed by: PHYSICIAN ASSISTANT

## 2024-11-18 PROCEDURE — 1036F TOBACCO NON-USER: CPT | Performed by: PHYSICIAN ASSISTANT
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
## 2024-11-19 ENCOUNTER — CARE COORDINATION (OUTPATIENT)
Dept: CARE COORDINATION | Age: 84
End: 2024-11-19

## 2024-11-19 ENCOUNTER — TELEPHONE (OUTPATIENT)
Age: 84
End: 2024-11-19

## 2024-11-19 ENCOUNTER — TELEPHONE (OUTPATIENT)
Dept: ORTHOPEDIC SURGERY | Age: 84
End: 2024-11-19

## 2024-11-19 NOTE — TELEPHONE ENCOUNTER
Nurse from [facility] called and inquired about if patient still needs to be on macrobid for a prophylactic now that she has a suprapubic catheter. (C/B # [phone])

## 2024-11-19 NOTE — TELEPHONE ENCOUNTER
Patient was put in a cam boot by Dr Johnson and patient was also put in a Knee brace by Dr Keyes    They would like to know if it is OK to Discontinue the knee brace while working with the Cam Boot.

## 2024-11-20 NOTE — CARE COORDINATION
Care Transitions Post-Acute Facility Update Call    2024    Patient: Komal Crawford Patient : 1940   MRN: [id number]  Reason for Admission:  Left ankle fracture ORIF  Discharge Date: 10/5/24 RARS: Readmission Risk Score: 35.1  Yvette  OT - independent upper body, lower body, SBA   Went to  with WB as tolerated with CAM boot  Mod assist to don / doff cam boot  Toileting min assist  Oral and facial care, from power wheelchair  Transfers min - CGA depending on safey awareness  PT - has ramp at home  Walking 10-15 ft at a time, once twice per session  CGA level, knee brace does not need to be work  She can stand anywhere with WB as tolerated  12/3/2024 tentative d/c date       Care Transitions Post Acute Facility Update    Care Transitions Interventions    Physical Therapy: Completed Other Services: Completed    Occupational Therapy: Completed           Post Acute Facility Update   Post Acute Facility: [facility]    LLOYDOS: 19 days      Nursing     Prescribed diet: Regular diet, Regular texture, Thin consistency    Wounds: Pos    Wound Location: lower leg ; surgical / cast   Skilled Medication therapies: PT / OT   Medical equipment being used: suprapubic catheter   Disease specific clinical considerations: DM, Winters esophagus, benign tumor of spinal cord, cerebral artery occlusion wih cerebral infarction, chronic back pain, chronic idiopathic thrombocytopenia, CVA, diabetic gastroparesis, GERD, diverticular disease, hiatal hernia, hip fracture, HLD, HTN, Mac degeneration left eye, neuropathy, OA, neurogenic bladder-self cath, TIA Tubular adenoma   LTHA 24, LTKA revision with OSS 24,    Reported Nursing Updates:      /72 - 2024 18:05 Position: Sitting l/arm P 68 - 2024 18:05 Pulse Type: Regular  Temp: T 97.7 - 2024 13:44 Route: Forehead (non-contact)  Respiratory: R 17.0 - 2024 18:50 , regular. Lung sounds clear. O2 94.0 % - 2024 18:06 Method: Room Air  Health

## 2024-11-20 NOTE — TELEPHONE ENCOUNTER
Patient was contacted and notified that she can discontinue knee brace as long as she is using a walker.  Patient stated that yes she is using a walker.

## 2024-11-22 ENCOUNTER — HOSPITAL ENCOUNTER (OUTPATIENT)
Age: 84
Setting detail: SPECIMEN
Discharge: HOME | End: 2024-11-22

## 2024-11-22 LAB
BASOPHILS # BLD: 0.1 K/UL (ref 0–0.2)
BASOPHILS NFR BLD: 2 % (ref 0–2)
EOSINOPHIL # BLD: 0.34 K/UL (ref 0–0.44)
EOSINOPHILS RELATIVE PERCENT: 5 % (ref 1–4)
ERYTHROCYTE [DISTWIDTH] IN BLOOD BY AUTOMATED COUNT: 16.3 % (ref 11.8–14.4)
HCT VFR BLD AUTO: 36.7 % (ref 36.3–47.1)
HGB BLD-MCNC: 11.1 G/DL (ref 11.9–15.1)
IMM GRANULOCYTES # BLD AUTO: 0.05 K/UL (ref 0–0.3)
IMM GRANULOCYTES NFR BLD: 1 %
LYMPHOCYTES NFR BLD: 1.19 K/UL (ref 1.1–3.7)
LYMPHOCYTES RELATIVE PERCENT: 18 % (ref 24–43)
MCH RBC QN AUTO: 28.4 PG (ref 25.2–33.5)
MCHC RBC AUTO-ENTMCNC: 30.2 G/DL (ref 28.4–34.8)
MCV RBC AUTO: 93.9 FL (ref 82.6–102.9)
MONOCYTES NFR BLD: 0.39 K/UL (ref 0.1–1.2)
MONOCYTES NFR BLD: 6 % (ref 3–12)
NEUTROPHILS NFR BLD: 70 % (ref 36–65)
NEUTS SEG NFR BLD: 4.74 K/UL (ref 1.5–8.1)
NRBC BLD-RTO: 0 PER 100 WBC
PLATELET # BLD AUTO: ABNORMAL K/UL (ref 138–453)
PLATELET, FLUORESCENCE: ABNORMAL K/UL (ref 138–453)
RBC # BLD AUTO: 3.91 M/UL (ref 3.95–5.11)
RBC # BLD: ABNORMAL 10*6/UL
WBC OTHER # BLD: 6.8 K/UL (ref 3.5–11.3)

## 2024-11-22 PROCEDURE — 85055 RETICULATED PLATELET ASSAY: CPT

## 2024-11-22 PROCEDURE — 85025 COMPLETE CBC W/AUTO DIFF WBC: CPT

## 2024-11-22 PROCEDURE — P9603 ONE-WAY ALLOW PRORATED MILES: HCPCS

## 2024-11-22 PROCEDURE — 36415 COLL VENOUS BLD VENIPUNCTURE: CPT

## 2024-11-25 ENCOUNTER — HOSPITAL ENCOUNTER (OUTPATIENT)
Age: 84
Setting detail: SPECIMEN
Discharge: HOME | End: 2024-11-25

## 2024-11-25 LAB
ANION GAP SERPL CALCULATED.3IONS-SCNC: 12 MMOL/L (ref 9–16)
BUN SERPL-MCNC: 28 MG/DL (ref 8–23)
CALCIUM SERPL-MCNC: 9.5 MG/DL (ref 8.6–10.4)
CHLORIDE SERPL-SCNC: 109 MMOL/L (ref 98–107)
CO2 SERPL-SCNC: 22 MMOL/L (ref 20–31)
CREAT SERPL-MCNC: 0.8 MG/DL (ref 0.6–0.9)
GFR, ESTIMATED: 73 ML/MIN/1.73M2
GLUCOSE SERPL-MCNC: 112 MG/DL (ref 74–99)
POTASSIUM SERPL-SCNC: 4.5 MMOL/L (ref 3.7–5.3)
SODIUM SERPL-SCNC: 143 MMOL/L (ref 136–145)

## 2024-11-25 PROCEDURE — 36415 COLL VENOUS BLD VENIPUNCTURE: CPT

## 2024-11-25 PROCEDURE — 80048 BASIC METABOLIC PNL TOTAL CA: CPT

## 2024-11-25 PROCEDURE — P9603 ONE-WAY ALLOW PRORATED MILES: HCPCS

## 2024-11-26 ENCOUNTER — HOSPITAL ENCOUNTER (OUTPATIENT)
Age: 84
Setting detail: SPECIMEN
Discharge: HOME | End: 2024-11-26

## 2024-11-26 LAB
ERYTHROCYTE [DISTWIDTH] IN BLOOD BY AUTOMATED COUNT: 16.3 % (ref 11.8–14.4)
HCT VFR BLD AUTO: 33.5 % (ref 36.3–47.1)
HGB BLD-MCNC: 10.4 G/DL (ref 11.9–15.1)
MCH RBC QN AUTO: 28.8 PG (ref 25.2–33.5)
MCHC RBC AUTO-ENTMCNC: 31 G/DL (ref 28.4–34.8)
MCV RBC AUTO: 92.8 FL (ref 82.6–102.9)
NRBC BLD-RTO: 0 PER 100 WBC
PLATELET # BLD AUTO: 159 K/UL (ref 138–453)
PMV BLD AUTO: ABNORMAL FL (ref 8.1–13.5)
RBC # BLD AUTO: 3.61 M/UL (ref 3.95–5.11)
WBC OTHER # BLD: 7.1 K/UL (ref 3.5–11.3)

## 2024-11-26 PROCEDURE — 36415 COLL VENOUS BLD VENIPUNCTURE: CPT

## 2024-11-26 PROCEDURE — P9603 ONE-WAY ALLOW PRORATED MILES: HCPCS

## 2024-11-26 PROCEDURE — 85027 COMPLETE CBC AUTOMATED: CPT

## 2024-12-04 ENCOUNTER — CARE COORDINATION (OUTPATIENT)
Dept: CARE COORDINATION | Age: 84
End: 2024-12-04

## 2024-12-05 ENCOUNTER — OFFICE VISIT (OUTPATIENT)
Age: 84
End: 2024-12-05
Payer: MEDICARE

## 2024-12-05 DIAGNOSIS — N31.9 NEUROGENIC BLADDER: Primary | ICD-10-CM

## 2024-12-05 DIAGNOSIS — N39.0 RECURRENT UTI: ICD-10-CM

## 2024-12-05 DIAGNOSIS — R33.9 URINARY RETENTION: ICD-10-CM

## 2024-12-05 DIAGNOSIS — N32.89 BLADDER SPASMS: ICD-10-CM

## 2024-12-05 PROCEDURE — 1123F ACP DISCUSS/DSCN MKR DOCD: CPT | Performed by: SPECIALIST

## 2024-12-05 PROCEDURE — G8427 DOCREV CUR MEDS BY ELIG CLIN: HCPCS | Performed by: SPECIALIST

## 2024-12-05 PROCEDURE — 51705 CHANGE OF BLADDER TUBE: CPT | Performed by: SPECIALIST

## 2024-12-05 PROCEDURE — 1090F PRES/ABSN URINE INCON ASSESS: CPT | Performed by: SPECIALIST

## 2024-12-05 PROCEDURE — G8400 PT W/DXA NO RESULTS DOC: HCPCS | Performed by: SPECIALIST

## 2024-12-05 PROCEDURE — 1036F TOBACCO NON-USER: CPT | Performed by: SPECIALIST

## 2024-12-05 PROCEDURE — 1159F MED LIST DOCD IN RCRD: CPT | Performed by: SPECIALIST

## 2024-12-05 PROCEDURE — 99214 OFFICE O/P EST MOD 30 MIN: CPT | Performed by: SPECIALIST

## 2024-12-05 PROCEDURE — G8484 FLU IMMUNIZE NO ADMIN: HCPCS | Performed by: SPECIALIST

## 2024-12-05 PROCEDURE — G8417 CALC BMI ABV UP PARAM F/U: HCPCS | Performed by: SPECIALIST

## 2024-12-05 RX ORDER — SOLIFENACIN SUCCINATE 10 MG/1
10 TABLET, FILM COATED ORAL DAILY
Qty: 30 TABLET | Refills: 5 | Status: SHIPPED | OUTPATIENT
Start: 2024-12-05

## 2024-12-05 RX ORDER — METOPROLOL SUCCINATE 25 MG/1
TABLET, EXTENDED RELEASE ORAL
COMMUNITY
Start: 2024-11-18

## 2024-12-05 RX ORDER — PANTOPRAZOLE SODIUM 40 MG/1
TABLET, DELAYED RELEASE ORAL
COMMUNITY
Start: 2024-12-03

## 2024-12-05 RX ORDER — DOXYCYCLINE 100 MG/1
CAPSULE ORAL
COMMUNITY
Start: 2024-12-03 | End: 2024-12-05

## 2024-12-05 RX ORDER — TIZANIDINE 2 MG/1
TABLET ORAL
COMMUNITY
Start: 2024-12-03

## 2024-12-05 RX ORDER — INSULIN GLARGINE-YFGN 100 [IU]/ML
INJECTION, SOLUTION SUBCUTANEOUS
COMMUNITY
Start: 2024-12-03
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
## 2024-12-05 NOTE — PROGRESS NOTES
Omega Lawson MD [facility] Urology Office Progress Note    Patient:  Komal Crawford  YOB: 1940  Date: 12/5/2024    HISTORY OF PRESENT ILLNESS:   The patient is a 84 y.o. female  Chronic Suprapubic Catheter   Patient presents today with a history of a chronic suprapubic catheter for several weeks.  The etiology is felt to be due to: Neurogenic bladder.  Overall, the problem is unchanged.     Procedure Note (12/5/24):  The patient's suprapubic catheter was removed.  After cleaning her off with Hibiclens and sterile water, 2% lidocaine urojet was instilled as an analgesic. Patient was upsized to a  22 Azeri SP catheter was inserted into the bladder and the catheter hooked up to a drainage bag.  07 mL of sterile water was used to fill up the balloon.  The patient tolerated the procedure well.    Summary of old records:   Urge incontinence: 2-3 ppd, s/p Interstim 12/10/13, removed 7/15/21, 7/20/17; Botox 200 IU 9/8/17, Botox 300 IU 5/14/21; Trospium SR 60 mg qd-stopped due to IBS; pain over Interstim, no infection  Incomplete bladder emptying on ISC 7 times a day; placed indwelling reeves; 9/6/24 Suprapubic (SP) catheter; Solifenacin (Vesicare) 5 mg po qd (bladder spasms)  \"Recurrent UTI\"; placed on Cranberry bid, Estrace vaginal cream, Macrobid qd, D-mannose tid (ID=Taleb); 12/14/16 Klebsiella/Enterococcus, 2/20/17 E coli, 3/29/21 E coli; 4/14/21 E coli and yeast; 7/21/21 E coli, 7/27/21 yeast; 4/28/21 cysto: erythema c/w yeast infection  TERRI s/p sling 2003, [place]  Bilateral renal cysts 7/10/12 parapelvic, 10/12/18 CT  Extrarenal pelvic R>L on 11/1/19 CT    Additional History:   Patient still having some right lower abdominal discomfort consistent with bladder spasms despite Solifenacin (Vesicare) 5 mg po qd for OAB symptoms.  Will increase the dose to Solifenacin (Vesicare) 10 mg po qd for OAB symptoms.    Procedures Today: N/A    Urinalysis today:  No results found for

## 2024-12-06 ENCOUNTER — CARE COORDINATION (OUTPATIENT)
Dept: CASE MANAGEMENT | Age: 84
End: 2024-12-06
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
## 2024-12-09 ENCOUNTER — TELEPHONE (OUTPATIENT)
Dept: WOUND CARE | Age: 84
End: 2024-12-09
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
## 2024-12-10 ENCOUNTER — CARE COORDINATION (OUTPATIENT)
Dept: CARE COORDINATION | Age: 84
End: 2024-12-10

## 2024-12-11 ENCOUNTER — OFFICE VISIT (OUTPATIENT)
Dept: ORTHOPEDIC SURGERY | Age: 84
End: 2024-12-11
Payer: MEDICARE

## 2024-12-11 DIAGNOSIS — Z96.652 HISTORY OF TOTAL KNEE ARTHROPLASTY, LEFT: ICD-10-CM

## 2024-12-11 DIAGNOSIS — M97.12XD PERIPROSTHETIC FRACTURE AROUND INTERNAL PROSTHETIC LEFT KNEE JOINT, SUBSEQUENT ENCOUNTER: Primary | ICD-10-CM

## 2024-12-11 PROCEDURE — G8428 CUR MEDS NOT DOCUMENT: HCPCS | Performed by: ORTHOPAEDIC SURGERY

## 2024-12-11 PROCEDURE — 99213 OFFICE O/P EST LOW 20 MIN: CPT | Performed by: ORTHOPAEDIC SURGERY

## 2024-12-11 PROCEDURE — 1036F TOBACCO NON-USER: CPT | Performed by: ORTHOPAEDIC SURGERY

## 2024-12-11 PROCEDURE — 1090F PRES/ABSN URINE INCON ASSESS: CPT | Performed by: ORTHOPAEDIC SURGERY

## 2024-12-11 PROCEDURE — G8484 FLU IMMUNIZE NO ADMIN: HCPCS | Performed by: ORTHOPAEDIC SURGERY

## 2024-12-11 PROCEDURE — G8417 CALC BMI ABV UP PARAM F/U: HCPCS | Performed by: ORTHOPAEDIC SURGERY

## 2024-12-11 PROCEDURE — G8400 PT W/DXA NO RESULTS DOC: HCPCS | Performed by: ORTHOPAEDIC SURGERY

## 2024-12-11 PROCEDURE — 1123F ACP DISCUSS/DSCN MKR DOCD: CPT | Performed by: ORTHOPAEDIC SURGERY

## 2024-12-11 NOTE — PROGRESS NOTES
performed the services described in this documentation. All medical record entries made by the scribe were at my direction and in my presence. I have reviewed the chart and discharge instructions and agree that the records reflect my personal performance and is accurate and complete. Damian Keyes MD. 12/11/24      Please note that this chart was generated using voice recognition Dragon dictation software.  Although every effort was made to ensure the accuracy of this automated transcription, some errors in transcription may have occurred.

## 2024-12-13 ENCOUNTER — HOSPITAL ENCOUNTER (OUTPATIENT)
Dept: WOUND CARE | Age: 84
Discharge: HOME | End: 2024-12-13
Payer: MEDICARE

## 2024-12-13 VITALS
TEMPERATURE: 97.5 F | RESPIRATION RATE: 18 BRPM | WEIGHT: 138 LBS | SYSTOLIC BLOOD PRESSURE: 158 MMHG | HEART RATE: 66 BPM | HEIGHT: 57 IN | BODY MASS INDEX: 29.77 KG/M2 | DIASTOLIC BLOOD PRESSURE: 72 MMHG

## 2024-12-13 DIAGNOSIS — T84.54XS INFECTION OF TOTAL LEFT KNEE REPLACEMENT, SEQUELA: ICD-10-CM

## 2024-12-13 DIAGNOSIS — L89.323 DECUBITUS ULCER OF LEFT BUTTOCK, STAGE 3 (HCC): ICD-10-CM

## 2024-12-13 DIAGNOSIS — S81.002A OPEN WOUND OF LEFT KNEE, INITIAL ENCOUNTER: Primary | ICD-10-CM

## 2024-12-13 DIAGNOSIS — B37.9 CANDIDIASIS: ICD-10-CM

## 2024-12-13 PROCEDURE — 99213 OFFICE O/P EST LOW 20 MIN: CPT

## 2024-12-13 PROCEDURE — 11042 DBRDMT SUBQ TIS 1ST 20SQCM/<: CPT

## 2024-12-13 RX ORDER — GENTAMICIN SULFATE 1 MG/G
OINTMENT TOPICAL ONCE
OUTPATIENT
Start: 2024-12-13 | End: 2024-12-13

## 2024-12-13 RX ORDER — BACITRACIN ZINC AND POLYMYXIN B SULFATE 500; 1000 [USP'U]/G; [USP'U]/G
OINTMENT TOPICAL ONCE
OUTPATIENT
Start: 2024-12-13 | End: 2024-12-13

## 2024-12-13 RX ORDER — LIDOCAINE 40 MG/G
CREAM TOPICAL ONCE
OUTPATIENT
Start: 2024-12-13 | End: 2024-12-13

## 2024-12-13 RX ORDER — BETAMETHASONE DIPROPIONATE 0.5 MG/G
CREAM TOPICAL ONCE
OUTPATIENT
Start: 2024-12-13 | End: 2024-12-13

## 2024-12-13 RX ORDER — SILVER SULFADIAZINE 10 MG/G
CREAM TOPICAL ONCE
OUTPATIENT
Start: 2024-12-13 | End: 2024-12-13

## 2024-12-13 RX ORDER — LIDOCAINE 50 MG/G
OINTMENT TOPICAL ONCE
OUTPATIENT
Start: 2024-12-13 | End: 2024-12-13

## 2024-12-13 RX ORDER — SODIUM CHLOR/HYPOCHLOROUS ACID 0.033 %
SOLUTION, IRRIGATION IRRIGATION ONCE
OUTPATIENT
Start: 2024-12-13 | End: 2024-12-13

## 2024-12-13 RX ORDER — LIDOCAINE HYDROCHLORIDE 20 MG/ML
JELLY TOPICAL ONCE
OUTPATIENT
Start: 2024-12-13 | End: 2024-12-13

## 2024-12-13 RX ORDER — CLOTRIMAZOLE 1 %
CREAM (GRAM) TOPICAL
Qty: 42 G | Refills: 1 | Status: SHIPPED | OUTPATIENT
Start: 2024-12-13 | End: 2024-12-20

## 2024-12-13 RX ORDER — CLOBETASOL PROPIONATE 0.5 MG/G
OINTMENT TOPICAL ONCE
OUTPATIENT
Start: 2024-12-13 | End: 2024-12-13

## 2024-12-13 RX ORDER — LIDOCAINE HYDROCHLORIDE 40 MG/ML
SOLUTION TOPICAL ONCE
OUTPATIENT
Start: 2024-12-13 | End: 2024-12-13

## 2024-12-13 RX ORDER — GINSENG 100 MG
CAPSULE ORAL ONCE
OUTPATIENT
Start: 2024-12-13 | End: 2024-12-13

## 2024-12-13 RX ORDER — TRIAMCINOLONE ACETONIDE 1 MG/G
OINTMENT TOPICAL ONCE
OUTPATIENT
Start: 2024-12-13 | End: 2024-12-13

## 2024-12-13 RX ORDER — NEOMYCIN/BACITRACIN/POLYMYXINB 3.5-400-5K
OINTMENT (GRAM) TOPICAL ONCE
OUTPATIENT
Start: 2024-12-13 | End: 2024-12-13

## 2024-12-13 RX ORDER — MUPIROCIN 20 MG/G
OINTMENT TOPICAL ONCE
OUTPATIENT
Start: 2024-12-13 | End: 2024-12-13

## 2024-12-13 RX ORDER — LIDOCAINE HYDROCHLORIDE 40 MG/ML
SOLUTION TOPICAL ONCE
Status: COMPLETED | OUTPATIENT
Start: 2024-12-13 | End: 2024-12-13

## 2024-12-13 RX ADMIN — LIDOCAINE HYDROCHLORIDE 5 ML: 40 SOLUTION TOPICAL at 11:20

## 2024-12-13 ASSESSMENT — PAIN - FUNCTIONAL ASSESSMENT: PAIN_FUNCTIONAL_ASSESSMENT: ACTIVITIES ARE NOT PREVENTED

## 2024-12-13 ASSESSMENT — PAIN SCALES - GENERAL: PAINLEVEL_OUTOF10: 0

## 2024-12-13 NOTE — DISCHARGE INSTRUCTIONS
please contact your PCP or go to the nearest emergency room.     The information contained in the After Visit Summary has been reviewed with me, the patient and/or responsible adult, by my health care provider(s). I had the opportunity to ask questions regarding this information. I have elected to receive;      []After Visit Summary  [x]Comprehensive Discharge Instruction      Patient signature______________________________________Date:________    Electronically signed by Rasheeda Dunn RN on 12/13/2024 at 11:42 AM  Electronically signed by David Arteaga MD on 12/13/2024 at 11:53 AM    4 (moderate pain)

## 2024-12-13 NOTE — PROGRESS NOTES
Margins Attached edges 12/13/24 1117   Number of days: 0     By using curette, 6 sq cm of subcutaneous tissue was debrided to wound #1         Plan:   Lotrim  cream for around suprapubic catheter  oral doxycycline 100 mg twice daily for chronic suppression  Follow BUN/creatinine, CBC C-reactive protein       Treatment Note please see Discharge Instructions    Written patient dismissal instructions given to patient and signed by patient or POA.       Electronically signed by David Arteaga MD on 12/13/2024 at 11:47 AM

## 2024-12-16 ENCOUNTER — OFFICE VISIT (OUTPATIENT)
Dept: ORTHOPEDIC SURGERY | Age: 84
End: 2024-12-16
Payer: MEDICARE

## 2024-12-16 VITALS — WEIGHT: 138 LBS | BODY MASS INDEX: 29.77 KG/M2 | HEIGHT: 57 IN

## 2024-12-16 DIAGNOSIS — R52 PAIN: Primary | ICD-10-CM

## 2024-12-16 PROCEDURE — G8400 PT W/DXA NO RESULTS DOC: HCPCS | Performed by: PHYSICIAN ASSISTANT

## 2024-12-16 PROCEDURE — 1123F ACP DISCUSS/DSCN MKR DOCD: CPT | Performed by: PHYSICIAN ASSISTANT

## 2024-12-16 PROCEDURE — 1090F PRES/ABSN URINE INCON ASSESS: CPT | Performed by: PHYSICIAN ASSISTANT

## 2024-12-16 PROCEDURE — 1159F MED LIST DOCD IN RCRD: CPT | Performed by: PHYSICIAN ASSISTANT

## 2024-12-16 PROCEDURE — G8427 DOCREV CUR MEDS BY ELIG CLIN: HCPCS | Performed by: PHYSICIAN ASSISTANT

## 2024-12-16 PROCEDURE — G8417 CALC BMI ABV UP PARAM F/U: HCPCS | Performed by: PHYSICIAN ASSISTANT

## 2024-12-16 PROCEDURE — G8484 FLU IMMUNIZE NO ADMIN: HCPCS | Performed by: PHYSICIAN ASSISTANT

## 2024-12-16 PROCEDURE — 99213 OFFICE O/P EST LOW 20 MIN: CPT | Performed by: PHYSICIAN ASSISTANT

## 2024-12-16 PROCEDURE — 1126F AMNT PAIN NOTED NONE PRSNT: CPT | Performed by: PHYSICIAN ASSISTANT

## 2024-12-16 PROCEDURE — 1036F TOBACCO NON-USER: CPT | Performed by: PHYSICIAN ASSISTANT
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
## 2024-12-17 NOTE — DISCHARGE INSTRUCTIONS
[facility] WOUND and HYPERBARIC TREATMENT  CENTER                            Visit  Discharge Instructions / Physician Orders  DATE:12/20/24     [facility] Care:[facility] 2 x per week     SUPPLIES ORDERED THRU: Home care to order supplies due to medicare guidelines                        Wound Location:  Left buttock     Cleanse with: Liquid antibacterial soap and water, rinse well      Dressing Orders:  Primary dressing   Althea      Secondary dressing mepilex border                          secure with           x 30days     Frequency:  Change every other day      Additional Orders: Increase protein to diet (meat, cheese, eggs, fish, peanut butter, nuts and beans)  Multivitamin daily     OFFLOADING [x] YES  TYPE:                  [] NA  GEL cushion (get off amazon)  Weekly wound care visits until determined otherwise.     Antibiotic therapy-wound care related YES [x] NO [] NA[]  Doxycycline 100 mg 2 x per day for chronic suppression  Lotrimin cream around SP cath site daily-stop powder for now  MY CHART []     Smart Device  []      HYPERBARIC TREATMENT-                TREATMENT #                          Your next appointment with the Wound Care Center is in 2 week                                                                                                   (Please note your next appointment above and if you are unable to keep, kindly give a 24 hour notice. Thank you.)  If more than 15 min late we cannot guarantee you will be seen due to clinician schedule  Per Policy, Excessive cancellation will call for dismissal from program.  If you experience any of the following, please call the Wound Care Center during business hours:  [phone]     * Increase in Pain  * Temperature over 101  * Increase in drainage from your wound  * Drainage with a foul odor  * Bleeding  * Increase in swelling  * Need for compression bandage changes due to slippage, breakthrough drainage.     If you need medical attention outside of the

## 2024-12-20 ENCOUNTER — HOSPITAL ENCOUNTER (OUTPATIENT)
Dept: WOUND CARE | Age: 84
Discharge: HOME | End: 2024-12-20
Payer: MEDICARE

## 2024-12-20 VITALS
SYSTOLIC BLOOD PRESSURE: 122 MMHG | WEIGHT: 138 LBS | DIASTOLIC BLOOD PRESSURE: 68 MMHG | BODY MASS INDEX: 29.77 KG/M2 | RESPIRATION RATE: 18 BRPM | HEART RATE: 71 BPM | HEIGHT: 57 IN | TEMPERATURE: 97.9 F

## 2024-12-20 DIAGNOSIS — L89.323 DECUBITUS ULCER OF LEFT BUTTOCK, STAGE 3 (HCC): ICD-10-CM

## 2024-12-20 DIAGNOSIS — S81.002A OPEN WOUND OF LEFT KNEE, INITIAL ENCOUNTER: Primary | ICD-10-CM

## 2024-12-20 PROCEDURE — 11042 DBRDMT SUBQ TIS 1ST 20SQCM/<: CPT

## 2024-12-20 RX ORDER — GINSENG 100 MG
CAPSULE ORAL ONCE
OUTPATIENT
Start: 2024-12-20 | End: 2024-12-20

## 2024-12-20 RX ORDER — SILVER SULFADIAZINE 10 MG/G
CREAM TOPICAL ONCE
OUTPATIENT
Start: 2024-12-20 | End: 2024-12-20

## 2024-12-20 RX ORDER — LIDOCAINE HYDROCHLORIDE 40 MG/ML
SOLUTION TOPICAL ONCE
OUTPATIENT
Start: 2024-12-20 | End: 2024-12-20

## 2024-12-20 RX ORDER — LIDOCAINE 40 MG/G
CREAM TOPICAL ONCE
OUTPATIENT
Start: 2024-12-20 | End: 2024-12-20

## 2024-12-20 RX ORDER — LIDOCAINE 50 MG/G
OINTMENT TOPICAL ONCE
OUTPATIENT
Start: 2024-12-20 | End: 2024-12-20

## 2024-12-20 RX ORDER — BETAMETHASONE DIPROPIONATE 0.5 MG/G
CREAM TOPICAL ONCE
OUTPATIENT
Start: 2024-12-20 | End: 2024-12-20

## 2024-12-20 RX ORDER — LIDOCAINE HYDROCHLORIDE 20 MG/ML
JELLY TOPICAL ONCE
OUTPATIENT
Start: 2024-12-20 | End: 2024-12-20

## 2024-12-20 RX ORDER — SODIUM CHLOR/HYPOCHLOROUS ACID 0.033 %
SOLUTION, IRRIGATION IRRIGATION ONCE
OUTPATIENT
Start: 2024-12-20 | End: 2024-12-20

## 2024-12-20 RX ORDER — LIDOCAINE HYDROCHLORIDE 40 MG/ML
SOLUTION TOPICAL ONCE
Status: COMPLETED | OUTPATIENT
Start: 2024-12-20 | End: 2024-12-20

## 2024-12-20 RX ORDER — TRIAMCINOLONE ACETONIDE 1 MG/G
OINTMENT TOPICAL ONCE
OUTPATIENT
Start: 2024-12-20 | End: 2024-12-20

## 2024-12-20 RX ORDER — MUPIROCIN 20 MG/G
OINTMENT TOPICAL ONCE
OUTPATIENT
Start: 2024-12-20 | End: 2024-12-20

## 2024-12-20 RX ORDER — CLOBETASOL PROPIONATE 0.5 MG/G
OINTMENT TOPICAL ONCE
OUTPATIENT
Start: 2024-12-20 | End: 2024-12-20

## 2024-12-20 RX ORDER — NEOMYCIN/BACITRACIN/POLYMYXINB 3.5-400-5K
OINTMENT (GRAM) TOPICAL ONCE
OUTPATIENT
Start: 2024-12-20 | End: 2024-12-20

## 2024-12-20 RX ORDER — GENTAMICIN SULFATE 1 MG/G
OINTMENT TOPICAL ONCE
OUTPATIENT
Start: 2024-12-20 | End: 2024-12-20

## 2024-12-20 RX ORDER — BACITRACIN ZINC AND POLYMYXIN B SULFATE 500; 1000 [USP'U]/G; [USP'U]/G
OINTMENT TOPICAL ONCE
OUTPATIENT
Start: 2024-12-20 | End: 2024-12-20

## 2024-12-20 RX ADMIN — LIDOCAINE HYDROCHLORIDE 5 ML: 40 SOLUTION TOPICAL at 08:52

## 2024-12-20 ASSESSMENT — PAIN SCALES - GENERAL: PAINLEVEL_OUTOF10: 0
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
## 2024-12-20 NOTE — PLAN OF CARE
Problem: Cognitive:  Goal: Knowledge of wound care  Description: Knowledge of wound care  Outcome: Progressing  Goal: Understands risk factors for wounds  Description: Understands risk factors for wounds  Outcome: Progressing     Problem: Pressure Ulcer:  Goal: Absence of new pressure ulcer  Description: Absence of new pressure ulcer  Outcome: Progressing     Problem: Falls - Risk of:  Goal: Will remain free from falls  Description: Will remain free from falls  Outcome: Progressing     Problem: Cognitive:  Goal: Knowledge of wound care  Description: Knowledge of wound care  Outcome: Progressing  Goal: Understands risk factors for wounds  Description: Understands risk factors for wounds  Outcome: Progressing     Problem: Pressure Ulcer:  Goal: Absence of new pressure ulcer  Description: Absence of new pressure ulcer  Outcome: Progressing     Problem: Falls - Risk of:  Goal: Will remain free from falls  Description: Will remain free from falls  Outcome: Progressing

## 2025-01-02 RX ORDER — FUROSEMIDE 20 MG/1
20 TABLET ORAL DAILY
Qty: 30 TABLET | Refills: 3 | Status: SHIPPED | OUTPATIENT
Start: 2025-01-02

## 2025-01-02 NOTE — DISCHARGE INSTRUCTIONS
[facility] WOUND and HYPERBARIC TREATMENT  CENTER                            Visit  Discharge Instructions / Physician Orders  DATE:1/3/25     Home Care:[facility] 2 x per week     SUPPLIES ORDERED THRU: Home care to order supplies due to medicare guidelines                        Wound Location:  Left buttock     Cleanse with: Liquid antibacterial soap and water, rinse well      Dressing Orders:  Primary dressing   Althea      Secondary dressing mepilex border                          secure with           x 30days     Frequency:  Change every other day      Additional Orders: Increase protein to diet (meat, cheese, eggs, fish, peanut butter, nuts and beans)  Multivitamin daily   Will measure for compression wraps Juxta fit wraps at next appointment  OFFLOADING [x] YES  TYPE:                  [] NA  GEL cushion (get off amazon)  Weekly wound care visits until determined otherwise.     Antibiotic therapy-wound care related YES [x] NO [] NA[]  Doxycycline 100 mg 2 x per day for chronic suppression  Lotrimin cream around SP cath site daily-stop powder for now  MY CHART []     Smart Device  []      HYPERBARIC TREATMENT-                TREATMENT #                          Your next appointment with the Wound Care Center is in 2 week                                                                                                   (Please note your next appointment above and if you are unable to keep, kindly give a 24 hour notice. Thank you.)  If more than 15 min late we cannot guarantee you will be seen due to clinician schedule  Per Policy, Excessive cancellation will call for dismissal from program.  If you experience any of the following, please call the Wound Care Center during business hours:  [phone]     * Increase in Pain  * Temperature over 101  * Increase in drainage from your wound  * Drainage with a foul odor  * Bleeding  * Increase in swelling  * Need for compression bandage changes due to slippage,

## 2025-01-03 ENCOUNTER — HOSPITAL ENCOUNTER (OUTPATIENT)
Dept: WOUND CARE | Age: 85
Discharge: HOME | End: 2025-01-03
Payer: MEDICARE

## 2025-01-03 VITALS
HEIGHT: 57 IN | SYSTOLIC BLOOD PRESSURE: 139 MMHG | TEMPERATURE: 96.3 F | DIASTOLIC BLOOD PRESSURE: 60 MMHG | RESPIRATION RATE: 17 BRPM | HEART RATE: 79 BPM | WEIGHT: 138 LBS | BODY MASS INDEX: 29.77 KG/M2

## 2025-01-03 DIAGNOSIS — L89.323 DECUBITUS ULCER OF LEFT BUTTOCK, STAGE 3 (HCC): Primary | ICD-10-CM

## 2025-01-03 PROBLEM — S81.002A OPEN WOUND OF LEFT KNEE: Status: RESOLVED | Noted: 2024-03-09 | Resolved: 2025-01-03

## 2025-01-03 PROCEDURE — 11042 DBRDMT SUBQ TIS 1ST 20SQCM/<: CPT

## 2025-01-03 RX ORDER — LIDOCAINE 40 MG/G
CREAM TOPICAL ONCE
OUTPATIENT
Start: 2025-01-03 | End: 2025-01-03

## 2025-01-03 RX ORDER — GINSENG 100 MG
CAPSULE ORAL ONCE
OUTPATIENT
Start: 2025-01-03 | End: 2025-01-03

## 2025-01-03 RX ORDER — LIDOCAINE HYDROCHLORIDE 40 MG/ML
SOLUTION TOPICAL ONCE
Status: COMPLETED | OUTPATIENT
Start: 2025-01-03 | End: 2025-01-03

## 2025-01-03 RX ORDER — GENTAMICIN SULFATE 1 MG/G
OINTMENT TOPICAL ONCE
OUTPATIENT
Start: 2025-01-03 | End: 2025-01-03

## 2025-01-03 RX ORDER — SILVER SULFADIAZINE 10 MG/G
CREAM TOPICAL ONCE
OUTPATIENT
Start: 2025-01-03 | End: 2025-01-03

## 2025-01-03 RX ORDER — LIDOCAINE HYDROCHLORIDE 40 MG/ML
SOLUTION TOPICAL ONCE
OUTPATIENT
Start: 2025-01-03 | End: 2025-01-03

## 2025-01-03 RX ORDER — MUPIROCIN 20 MG/G
OINTMENT TOPICAL ONCE
OUTPATIENT
Start: 2025-01-03 | End: 2025-01-03

## 2025-01-03 RX ORDER — BETAMETHASONE DIPROPIONATE 0.5 MG/G
CREAM TOPICAL ONCE
OUTPATIENT
Start: 2025-01-03 | End: 2025-01-03

## 2025-01-03 RX ORDER — SODIUM CHLOR/HYPOCHLOROUS ACID 0.033 %
SOLUTION, IRRIGATION IRRIGATION ONCE
OUTPATIENT
Start: 2025-01-03 | End: 2025-01-03

## 2025-01-03 RX ORDER — LIDOCAINE HYDROCHLORIDE 20 MG/ML
JELLY TOPICAL ONCE
OUTPATIENT
Start: 2025-01-03 | End: 2025-01-03

## 2025-01-03 RX ORDER — LIDOCAINE 50 MG/G
OINTMENT TOPICAL ONCE
OUTPATIENT
Start: 2025-01-03 | End: 2025-01-03

## 2025-01-03 RX ORDER — TRIAMCINOLONE ACETONIDE 1 MG/G
OINTMENT TOPICAL ONCE
OUTPATIENT
Start: 2025-01-03 | End: 2025-01-03

## 2025-01-03 RX ORDER — NEOMYCIN/BACITRACIN/POLYMYXINB 3.5-400-5K
OINTMENT (GRAM) TOPICAL ONCE
OUTPATIENT
Start: 2025-01-03 | End: 2025-01-03

## 2025-01-03 RX ORDER — BACITRACIN ZINC AND POLYMYXIN B SULFATE 500; 1000 [USP'U]/G; [USP'U]/G
OINTMENT TOPICAL ONCE
OUTPATIENT
Start: 2025-01-03 | End: 2025-01-03

## 2025-01-03 RX ORDER — CLOBETASOL PROPIONATE 0.5 MG/G
OINTMENT TOPICAL ONCE
OUTPATIENT
Start: 2025-01-03 | End: 2025-01-03

## 2025-01-03 RX ADMIN — LIDOCAINE HYDROCHLORIDE 5 ML: 40 SOLUTION TOPICAL at 13:10

## 2025-01-03 ASSESSMENT — ENCOUNTER SYMPTOMS
SHORTNESS OF BREATH: 0
RHINORRHEA: 0
VOMITING: 0
NAUSEA: 0
COUGH: 0
DIARRHEA: 0

## 2025-01-03 ASSESSMENT — PAIN SCALES - GENERAL: PAINLEVEL_OUTOF10: 0

## 2025-01-03 NOTE — PROGRESS NOTES
Intolerance-unknown  Hands, feet, mouth, eyes  Other reaction(s): Unknown    Betadine [Povidone Iodine] Hives    Cephalexin Hives and Swelling    Cephalexin Itching     Other reaction(s): Hallucinations  In 1969 received demerol and keflex together so was told to list both as allergies    Cozaar [Losartan] Nausea Only     Pt also gets sores on toungue    Cymbalta [Duloxetine Hcl] Diarrhea and Nausea And Vomiting     Weakness    Demerol Hives and Swelling    Doxycycline Other (See Comments)     Sore mouth  Pt states is currently on without reaction, reported on 8/30/24      Duloxetine Diarrhea, Other (See Comments), Nausea And Vomiting and Nausea Only     Other reaction(s): Other (See Comments)  Weakness, diarrhea  Weakness      Meperidine Itching     Other reaction(s): Hallucinations      Morphine Hives and Itching    Penicillins Hives, Swelling and Itching     Other reaction(s): Intolerance-unknown  Other reaction(s): Unknown    Zithromax [Azithromycin] Other (See Comments)     Sore mouth      Cephalosporins     Ciprofloxacin      Pt stated it gave her canker sores    Clindamycin/Lincomycin     Etodolac     Fesoterodine      not sure      Fluorescein     Iodine      Other reaction(s): Intolerance-unknown    Lisinopril      cough    Meperidine And Related     Other     Penicillin G     Toviaz [Fesoterodine Fumarate Er]     Clonazepam Other (See Comments)     From neuro: made her too sleepy    Metformin And Related Nausea And Vomiting     Diarrhea and N/V       MEDICATIONS    Current Outpatient Medications on File Prior to Encounter   Medication Sig Dispense Refill    furosemide (LASIX) 20 MG tablet TAKE ONE (1) TABLET BY MOUTH DAILY 30 tablet 3    Insulin Glargine-yfgn 100 UNIT/ML SOPN       metoprolol succinate (TOPROL XL) 25 MG extended release tablet       tiZANidine (ZANAFLEX) 2 MG tablet       solifenacin (VESICARE) 10 MG tablet Take 1 tablet by mouth daily 30 tablet 5    dicyclomine (BENTYL) 10 MG capsule Take

## 2025-01-03 NOTE — PLAN OF CARE
Problem: Pressure Ulcer:  Goal: Absence of new pressure ulcer  Description: Absence of new pressure ulcer  Outcome: Progressing     Problem: Falls - Risk of:  Goal: Will remain free from falls  Description: Will remain free from falls  Outcome: Progressing

## 2025-01-06 ENCOUNTER — OFFICE VISIT (OUTPATIENT)
Age: 85
End: 2025-01-06
Payer: MEDICARE

## 2025-01-06 VITALS — HEIGHT: 57 IN | WEIGHT: 138 LBS | BODY MASS INDEX: 29.77 KG/M2

## 2025-01-06 DIAGNOSIS — R33.9 URINARY RETENTION: ICD-10-CM

## 2025-01-06 DIAGNOSIS — N32.89 BLADDER SPASMS: ICD-10-CM

## 2025-01-06 DIAGNOSIS — N31.9 NEUROGENIC BLADDER: Primary | ICD-10-CM

## 2025-01-06 PROCEDURE — 51705 CHANGE OF BLADDER TUBE: CPT | Performed by: SPECIALIST

## 2025-01-06 RX ORDER — LIDOCAINE HYDROCHLORIDE 20 MG/ML
JELLY TOPICAL ONCE
Status: SHIPPED | OUTPATIENT
Start: 2025-01-06

## 2025-01-06 NOTE — PROGRESS NOTES
Omega Lawson MD [facility] Urology Office Progress Note    Patient:  Komal Crawford  YOB: 1940  Date: 1/6/2025    HISTORY OF PRESENT ILLNESS:   The patient is a 84 y.o. female  Chronic Suprapubic Catheter   Patient presents today with a history of a chronic suprapubic catheter for several weeks.  The etiology is felt to be due to: Neurogenic bladder.  Overall, the problem is unchanged.     Procedure Note (1/6/25):  The patient's suprapubic catheter was removed.  After cleaning her off with Hibiclens and sterile water, 2% lidocaine urojet was instilled as an analgesic. Patient was upsized to a  22 Monegasque SP catheter was inserted into the bladder and the catheter hooked up to a drainage bag.  07 mL of sterile water was used to fill up the balloon.  The patient tolerated the procedure well.    Summary of old records:   Urge incontinence: 2-3 ppd, s/p Interstim 12/10/13, removed 7/15/21, 7/20/17; Botox 200 IU 9/8/17, Botox 300 IU 5/14/21; Trospium SR 60 mg qd-stopped due to IBS; pain over Interstim, no infection  Incomplete bladder emptying on ISC 7 times a day; placed indwelling reeves; 9/6/24 Suprapubic (SP) catheter; Solifenacin (Vesicare) 5 mg po qd (bladder spasms)  \"Recurrent UTI\"; placed on Cranberry bid, Estrace vaginal cream, Macrobid qd, D-mannose tid (ID=Taleb); 12/14/16 Klebsiella/Enterococcus, 2/20/17 E coli, 3/29/21 E coli; 4/14/21 E coli and yeast; 7/21/21 E coli, 7/27/21 yeast; 4/28/21 cysto: erythema c/w yeast infection  TERRI s/p sling 2003, [place]  Bilateral renal cysts 7/10/12 parapelvic, 10/12/18 CT  Extrarenal pelvic R>L on 11/1/19 CT    Additional History: none    Procedures Today: see above    Urinalysis today:  No results found for this visit on 01/06/25.    Last BUN and creatinine:  Lab Results   Component Value Date    BUN 28 (H) 11/25/2024     Lab Results   Component Value Date    CREATININE 0.8 11/25/2024       Last CBC:  Lab Results   Component

## 2025-01-07 ENCOUNTER — TELEPHONE (OUTPATIENT)
Age: 85
End: 2025-01-07

## 2025-01-07 NOTE — TELEPHONE ENCOUNTER
Pt called into office today and stated she needs the lido uro-jet to go to her home [facility] for her cath changes. Writer did see it ordered but unsure of where it was sent

## 2025-01-08 DIAGNOSIS — Z93.59 PRESENCE OF SUPRAPUBIC CATHETER (HCC): ICD-10-CM

## 2025-01-08 DIAGNOSIS — Z79.899 ANALGESIC USE: Primary | ICD-10-CM

## 2025-01-08 RX ORDER — LIDOCAINE HYDROCHLORIDE 20 MG/ML
JELLY TOPICAL PRN
Qty: 3 EACH | Refills: 3 | Status: SHIPPED | OUTPATIENT
Start: 2025-01-08

## 2025-01-13 ENCOUNTER — OFFICE VISIT (OUTPATIENT)
Dept: PHYSICAL MEDICINE AND REHAB | Age: 85
End: 2025-01-13
Payer: MEDICARE

## 2025-01-13 VITALS
DIASTOLIC BLOOD PRESSURE: 70 MMHG | WEIGHT: 137 LBS | HEART RATE: 68 BPM | HEIGHT: 57 IN | BODY MASS INDEX: 29.56 KG/M2 | SYSTOLIC BLOOD PRESSURE: 120 MMHG

## 2025-01-13 DIAGNOSIS — H91.93 BILATERAL HEARING LOSS, UNSPECIFIED HEARING LOSS TYPE: ICD-10-CM

## 2025-01-13 DIAGNOSIS — L89.303 PRESSURE INJURY OF BUTTOCK, STAGE 3, UNSPECIFIED LATERALITY (HCC): ICD-10-CM

## 2025-01-13 DIAGNOSIS — R26.89 IMPAIRED GAIT AND MOBILITY: Primary | ICD-10-CM

## 2025-01-13 PROCEDURE — 3074F SYST BP LT 130 MM HG: CPT | Performed by: PHYSICAL MEDICINE & REHABILITATION

## 2025-01-13 PROCEDURE — G8417 CALC BMI ABV UP PARAM F/U: HCPCS | Performed by: PHYSICAL MEDICINE & REHABILITATION

## 2025-01-13 PROCEDURE — 3078F DIAST BP <80 MM HG: CPT | Performed by: PHYSICAL MEDICINE & REHABILITATION

## 2025-01-13 PROCEDURE — 1090F PRES/ABSN URINE INCON ASSESS: CPT | Performed by: PHYSICAL MEDICINE & REHABILITATION

## 2025-01-13 PROCEDURE — G8400 PT W/DXA NO RESULTS DOC: HCPCS | Performed by: PHYSICAL MEDICINE & REHABILITATION

## 2025-01-13 PROCEDURE — 99213 OFFICE O/P EST LOW 20 MIN: CPT | Performed by: PHYSICAL MEDICINE & REHABILITATION

## 2025-01-13 PROCEDURE — G8427 DOCREV CUR MEDS BY ELIG CLIN: HCPCS | Performed by: PHYSICAL MEDICINE & REHABILITATION

## 2025-01-13 PROCEDURE — 1036F TOBACCO NON-USER: CPT | Performed by: PHYSICAL MEDICINE & REHABILITATION

## 2025-01-13 PROCEDURE — 1159F MED LIST DOCD IN RCRD: CPT | Performed by: PHYSICAL MEDICINE & REHABILITATION

## 2025-01-13 PROCEDURE — 1123F ACP DISCUSS/DSCN MKR DOCD: CPT | Performed by: PHYSICAL MEDICINE & REHABILITATION
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
## 2025-01-15 NOTE — DISCHARGE INSTRUCTIONS
[facility] WOUND and HYPERBARIC TREATMENT  CENTER                            Visit  Discharge Instructions / Physician Orders  DATE:1/17/25     Home Care:[facility] 2 x per week     SUPPLIES ORDERED THRU: Home care to order supplies due to medicare guidelines                        Wound Location:  Left buttock     Cleanse with: Liquid antibacterial soap and water, rinse well      Dressing Orders:  Primary dressing   Althea      Secondary dressing mepilex border                          secure with           x 30days     Frequency:  Change every other day      Additional Orders: Increase protein to diet (meat, cheese, eggs, fish, peanut butter, nuts and beans)  Multivitamin daily   Will measure for compression wraps Juxta fit wraps at next appointment  OFFLOADING [x] YES  TYPE:                  [] NA  GEL cushion (get off amazon)  Weekly wound care visits until determined otherwise.     Antibiotic therapy-wound care related YES [x] NO [] NA[]  Doxycycline 100 mg 2 x per day for chronic suppression-continue call wound care if you are getting low  Lotrimin cream around SP cath site daily-stop powder for now  MY CHART []     Smart Device  []      HYPERBARIC TREATMENT-                TREATMENT #                          Your next appointment with the Wound Care Center is in 2 week                                                                                                   (Please note your next appointment above and if you are unable to keep, kindly give a 24 hour notice. Thank you.)  If more than 15 min late we cannot guarantee you will be seen due to clinician schedule  Per Policy, Excessive cancellation will call for dismissal from program.  If you experience any of the following, please call the Wound Care Center during business hours:  [phone]     * Increase in Pain  * Temperature over 101  * Increase in drainage from your wound  * Drainage with a foul odor  * Bleeding  * Increase in swelling  * Need for

## 2025-01-17 ENCOUNTER — HOSPITAL ENCOUNTER (OUTPATIENT)
Dept: WOUND CARE | Age: 85
Discharge: HOME | End: 2025-01-17
Payer: MEDICARE

## 2025-01-17 VITALS
RESPIRATION RATE: 18 BRPM | HEART RATE: 72 BPM | WEIGHT: 137 LBS | TEMPERATURE: 98.1 F | DIASTOLIC BLOOD PRESSURE: 66 MMHG | SYSTOLIC BLOOD PRESSURE: 97 MMHG | BODY MASS INDEX: 29.65 KG/M2

## 2025-01-17 DIAGNOSIS — L89.323 DECUBITUS ULCER OF LEFT BUTTOCK, STAGE 3 (HCC): Primary | ICD-10-CM

## 2025-01-17 PROCEDURE — 11042 DBRDMT SUBQ TIS 1ST 20SQCM/<: CPT

## 2025-01-17 RX ORDER — NEOMYCIN/BACITRACIN/POLYMYXINB 3.5-400-5K
OINTMENT (GRAM) TOPICAL ONCE
OUTPATIENT
Start: 2025-01-17 | End: 2025-01-17

## 2025-01-17 RX ORDER — GENTAMICIN SULFATE 1 MG/G
OINTMENT TOPICAL ONCE
OUTPATIENT
Start: 2025-01-17 | End: 2025-01-17

## 2025-01-17 RX ORDER — TRIAMCINOLONE ACETONIDE 1 MG/G
OINTMENT TOPICAL ONCE
OUTPATIENT
Start: 2025-01-17 | End: 2025-01-17

## 2025-01-17 RX ORDER — BETAMETHASONE DIPROPIONATE 0.5 MG/G
CREAM TOPICAL ONCE
OUTPATIENT
Start: 2025-01-17 | End: 2025-01-17

## 2025-01-17 RX ORDER — SODIUM CHLOR/HYPOCHLOROUS ACID 0.033 %
SOLUTION, IRRIGATION IRRIGATION ONCE
OUTPATIENT
Start: 2025-01-17 | End: 2025-01-17

## 2025-01-17 RX ORDER — LIDOCAINE 40 MG/G
CREAM TOPICAL ONCE
OUTPATIENT
Start: 2025-01-17 | End: 2025-01-17

## 2025-01-17 RX ORDER — LIDOCAINE HYDROCHLORIDE 40 MG/ML
SOLUTION TOPICAL ONCE
Status: COMPLETED | OUTPATIENT
Start: 2025-01-17 | End: 2025-01-17

## 2025-01-17 RX ORDER — LIDOCAINE HYDROCHLORIDE 40 MG/ML
SOLUTION TOPICAL ONCE
OUTPATIENT
Start: 2025-01-17 | End: 2025-01-17

## 2025-01-17 RX ORDER — LIDOCAINE HYDROCHLORIDE 20 MG/ML
JELLY TOPICAL ONCE
OUTPATIENT
Start: 2025-01-17 | End: 2025-01-17

## 2025-01-17 RX ORDER — LIDOCAINE 50 MG/G
OINTMENT TOPICAL ONCE
OUTPATIENT
Start: 2025-01-17 | End: 2025-01-17

## 2025-01-17 RX ORDER — SILVER SULFADIAZINE 10 MG/G
CREAM TOPICAL ONCE
OUTPATIENT
Start: 2025-01-17 | End: 2025-01-17

## 2025-01-17 RX ORDER — BACITRACIN ZINC AND POLYMYXIN B SULFATE 500; 1000 [USP'U]/G; [USP'U]/G
OINTMENT TOPICAL ONCE
OUTPATIENT
Start: 2025-01-17 | End: 2025-01-17

## 2025-01-17 RX ORDER — CLOBETASOL PROPIONATE 0.5 MG/G
OINTMENT TOPICAL ONCE
OUTPATIENT
Start: 2025-01-17 | End: 2025-01-17

## 2025-01-17 RX ORDER — MUPIROCIN 20 MG/G
OINTMENT TOPICAL ONCE
OUTPATIENT
Start: 2025-01-17 | End: 2025-01-17

## 2025-01-17 RX ORDER — GINSENG 100 MG
CAPSULE ORAL ONCE
OUTPATIENT
Start: 2025-01-17 | End: 2025-01-17

## 2025-01-17 RX ADMIN — LIDOCAINE HYDROCHLORIDE 5 ML: 40 SOLUTION TOPICAL at 11:19

## 2025-01-17 ASSESSMENT — PAIN SCALES - GENERAL: PAINLEVEL_OUTOF10: 0

## 2025-01-17 NOTE — PROGRESS NOTES
sq cm of subcutaneous tissue was debrided to wound #1         Plan:     oral doxycycline 100 mg twice daily for chronic suppression  Follow BUN/creatinine, CBC C-reactive protein       Treatment Note please see Discharge Instructions    Written patient dismissal instructions given to patient and signed by patient or POA.       Electronically signed by David Arteaga MD on 1/17/2025 at 11:55 AM

## 2025-01-22 ENCOUNTER — HOSPITAL ENCOUNTER (OUTPATIENT)
Age: 85
Setting detail: SPECIMEN
Discharge: HOME | End: 2025-01-22

## 2025-01-22 ENCOUNTER — OFFICE VISIT (OUTPATIENT)
Dept: ORTHOPEDIC SURGERY | Age: 85
End: 2025-01-22
Payer: MEDICARE

## 2025-01-22 VITALS — WEIGHT: 137 LBS | BODY MASS INDEX: 29.56 KG/M2 | HEIGHT: 57 IN

## 2025-01-22 DIAGNOSIS — Z96.652 HISTORY OF TOTAL KNEE ARTHROPLASTY, LEFT: ICD-10-CM

## 2025-01-22 DIAGNOSIS — M25.562 PAIN AND SWELLING OF LEFT KNEE: ICD-10-CM

## 2025-01-22 DIAGNOSIS — R52 PAIN: Primary | ICD-10-CM

## 2025-01-22 DIAGNOSIS — M25.462 PAIN AND SWELLING OF LEFT KNEE: ICD-10-CM

## 2025-01-22 LAB
BASOPHILS # BLD: 0.11 K/UL (ref 0–0.2)
BASOPHILS NFR BLD: 2 % (ref 0–2)
CRP SERPL HS-MCNC: 19.7 MG/L (ref 0–5)
EOSINOPHIL # BLD: 0.26 K/UL (ref 0–0.44)
EOSINOPHILS RELATIVE PERCENT: 3 % (ref 1–4)
ERYTHROCYTE [DISTWIDTH] IN BLOOD BY AUTOMATED COUNT: 15.7 % (ref 11.8–14.4)
ERYTHROCYTE [SEDIMENTATION RATE] IN BLOOD BY PHOTOMETRIC METHOD: 63 MM/HR (ref 0–30)
HCT VFR BLD AUTO: 36.4 % (ref 36.3–47.1)
HGB BLD-MCNC: 11.2 G/DL (ref 11.9–15.1)
IMM GRANULOCYTES # BLD AUTO: 0.04 K/UL (ref 0–0.3)
IMM GRANULOCYTES NFR BLD: 1 %
LYMPHOCYTES NFR BLD: 1.9 K/UL (ref 1.1–3.7)
LYMPHOCYTES RELATIVE PERCENT: 25 % (ref 24–43)
MCH RBC QN AUTO: 27.6 PG (ref 25.2–33.5)
MCHC RBC AUTO-ENTMCNC: 30.8 G/DL (ref 28.4–34.8)
MCV RBC AUTO: 89.7 FL (ref 82.6–102.9)
MONOCYTES NFR BLD: 0.63 K/UL (ref 0.1–1.2)
MONOCYTES NFR BLD: 8 % (ref 3–12)
NEUTROPHILS NFR BLD: 61 % (ref 36–65)
NEUTS SEG NFR BLD: 4.64 K/UL (ref 1.5–8.1)
NRBC BLD-RTO: 0 PER 100 WBC
PLATELET # BLD AUTO: ABNORMAL K/UL (ref 138–453)
PLATELET, FLUORESCENCE: 227 K/UL (ref 138–453)
PLATELETS.RETICULATED NFR BLD AUTO: 16.3 % (ref 1.1–10.3)
RBC # BLD AUTO: 4.06 M/UL (ref 3.95–5.11)
RBC # BLD: ABNORMAL 10*6/UL
WBC OTHER # BLD: 7.6 K/UL (ref 3.5–11.3)

## 2025-01-22 PROCEDURE — 1159F MED LIST DOCD IN RCRD: CPT | Performed by: PHYSICIAN ASSISTANT

## 2025-01-22 PROCEDURE — G8417 CALC BMI ABV UP PARAM F/U: HCPCS | Performed by: PHYSICIAN ASSISTANT

## 2025-01-22 PROCEDURE — G8427 DOCREV CUR MEDS BY ELIG CLIN: HCPCS | Performed by: PHYSICIAN ASSISTANT

## 2025-01-22 PROCEDURE — 1126F AMNT PAIN NOTED NONE PRSNT: CPT | Performed by: PHYSICIAN ASSISTANT

## 2025-01-22 PROCEDURE — 1090F PRES/ABSN URINE INCON ASSESS: CPT | Performed by: PHYSICIAN ASSISTANT

## 2025-01-22 PROCEDURE — G8400 PT W/DXA NO RESULTS DOC: HCPCS | Performed by: PHYSICIAN ASSISTANT

## 2025-01-22 PROCEDURE — 99214 OFFICE O/P EST MOD 30 MIN: CPT | Performed by: PHYSICIAN ASSISTANT

## 2025-01-22 PROCEDURE — 1036F TOBACCO NON-USER: CPT | Performed by: PHYSICIAN ASSISTANT

## 2025-01-22 PROCEDURE — 1123F ACP DISCUSS/DSCN MKR DOCD: CPT | Performed by: PHYSICIAN ASSISTANT

## 2025-01-22 RX ORDER — TRAMADOL HYDROCHLORIDE 50 MG/1
50 TABLET ORAL EVERY 6 HOURS PRN
Qty: 28 TABLET | Refills: 0 | Status: SHIPPED | OUTPATIENT
Start: 2025-01-22 | End: 2025-01-29

## 2025-01-23 ENCOUNTER — HOSPITAL ENCOUNTER (OUTPATIENT)
Dept: CT IMAGING | Age: 85
Discharge: HOME | End: 2025-01-25
Payer: MEDICARE

## 2025-01-23 DIAGNOSIS — M25.562 PAIN AND SWELLING OF LEFT KNEE: ICD-10-CM

## 2025-01-23 DIAGNOSIS — Z96.652 HISTORY OF TOTAL KNEE ARTHROPLASTY, LEFT: ICD-10-CM

## 2025-01-23 DIAGNOSIS — M25.462 PAIN AND SWELLING OF LEFT KNEE: ICD-10-CM

## 2025-01-23 PROCEDURE — 73700 CT LOWER EXTREMITY W/O DYE: CPT

## 2025-01-24 NOTE — PROGRESS NOTES
were no vitals filed for this visit.Body mass index is 29.64 kg/m².  General: No acute distress, resting comfortably in the clinic  Neuro: alert. oriented  Eyes: Extra-ocular muscles intact  Pulm: Respirations unlabored and regular.  Skin: warm, well perfused  Psych:   Patient has good fund of knowledge and displays understanding of exam, diagnosis, and plan.  Left Lower Extremity:   Left knee: Focal area of erythema centered over the medial knee measuring approximately 5 x 5 cm centered around the medial joint line.  Unable to appreciate any obvious joint effusion.  No appreciable collection of fluid there is no evidence of obvious abscess, fluctuance or induration.  Range of motion from 5 to 100 degrees with weakness on resisted extension consistent with previous extensor mechanism repair.  Knee is stable to varus and valgus stress.     Skin is overall intact some dryness underlying the boot but no significant erythema.  No evidence of ecchymosis or abrasions.. Skin intact.  Mild tenderness to palpation of the medial malleolus.  No significant tenderness over the lateral malleolus.  No open wounds.  Scar noted to left knee and hip from prior surgical intervention. . Compartments soft/compressible. Extremity warm/well perfused. EHL/FHL/TA/GSC motor intact. Patient expresses normal sensation L3-S1 distribution     Radiology:  Imaging studies from today were independently reviewed and read as listed below. Any relevant images obtained prior to today's visit were also independently interpreted.        History: 84-year-old female with a left ankle fracture    Comparison: 12/16/2024    Findings: 3 views of the left ankle (AP/lateral/mortise) in a skeletally mature patient showing redemonstration of left bimalleolar ankle fracture in unchanged position/alignment compared to prior films.  Ankle mortise well-maintained.  There does appear to be early callus formation especially notable to the medial malleolus fracture

## 2025-01-27 RX ORDER — NITROFURANTOIN 25; 75 MG/1; MG/1
100 CAPSULE ORAL DAILY
Qty: 30 CAPSULE | Refills: 2 | OUTPATIENT
Start: 2025-01-27

## 2025-01-28 NOTE — DISCHARGE INSTRUCTIONS
[facility] WOUND and HYPERBARIC TREATMENT  CENTER                            Visit  Discharge Instructions / Physician Orders  DATE:1/31/25     Home Care:[facility] 2 x per week     SUPPLIES ORDERED THRU: Home care to order supplies due to medicare guidelines                        Wound Location:  Left buttock     Cleanse with: Liquid antibacterial soap and water, rinse well      Dressing Orders:  Primary dressing   Althea      Secondary dressing mepilex border                           may add triad cream  Frequency:  Change every other day      Additional Orders: Increase protein to diet (meat, cheese, eggs, fish, peanut butter, nuts and beans)  Multivitamin daily   Will measure for compression wraps Juxta fit wraps at next appointment  OFFLOADING [x] YES  TYPE:                  [] NA  GEL cushion (get off amazon)  Weekly wound care visits until determined otherwise.     Antibiotic therapy-wound care related YES [x] NO [] NA[]  Doxycycline 100 mg 2 x per day for chronic suppression-New order sent to [facility] 1/31/25  Lotrimin cream around SP cath site daily-stop powder for now  MY CHART []     Smart Device  []      HYPERBARIC TREATMENT-                TREATMENT #                          Your next appointment with the Wound Care Center is in 2 week                                                                                                   (Please note your next appointment above and if you are unable to keep, kindly give a 24 hour notice. Thank you.)  If more than 15 min late we cannot guarantee you will be seen due to clinician schedule  Per Policy, Excessive cancellation will call for dismissal from program.  If you experience any of the following, please call the Wound Care Center during business hours:  [phone]     * Increase in Pain  * Temperature over 101  * Increase in drainage from your wound  * Drainage with a foul odor  * Bleeding  * Increase in swelling  * Need for compression bandage changes  10-Oct-2024 22:48

## 2025-01-29 ENCOUNTER — OFFICE VISIT (OUTPATIENT)
Dept: ORTHOPEDIC SURGERY | Age: 85
End: 2025-01-29
Payer: MEDICARE

## 2025-01-29 VITALS — HEIGHT: 57 IN | BODY MASS INDEX: 29.56 KG/M2 | WEIGHT: 137 LBS

## 2025-01-29 DIAGNOSIS — S82.842A BIMALLEOLAR FRACTURE, LEFT, CLOSED, INITIAL ENCOUNTER: Primary | ICD-10-CM

## 2025-01-29 DIAGNOSIS — L03.116 CELLULITIS OF LEFT KNEE: ICD-10-CM

## 2025-01-29 PROCEDURE — 1090F PRES/ABSN URINE INCON ASSESS: CPT | Performed by: PHYSICIAN ASSISTANT

## 2025-01-29 PROCEDURE — 1126F AMNT PAIN NOTED NONE PRSNT: CPT | Performed by: PHYSICIAN ASSISTANT

## 2025-01-29 PROCEDURE — 1159F MED LIST DOCD IN RCRD: CPT | Performed by: PHYSICIAN ASSISTANT

## 2025-01-29 PROCEDURE — G8400 PT W/DXA NO RESULTS DOC: HCPCS | Performed by: PHYSICIAN ASSISTANT

## 2025-01-29 PROCEDURE — 1123F ACP DISCUSS/DSCN MKR DOCD: CPT | Performed by: PHYSICIAN ASSISTANT

## 2025-01-29 PROCEDURE — G8417 CALC BMI ABV UP PARAM F/U: HCPCS | Performed by: PHYSICIAN ASSISTANT

## 2025-01-29 PROCEDURE — 99213 OFFICE O/P EST LOW 20 MIN: CPT | Performed by: PHYSICIAN ASSISTANT

## 2025-01-29 PROCEDURE — 1036F TOBACCO NON-USER: CPT | Performed by: PHYSICIAN ASSISTANT

## 2025-01-29 PROCEDURE — G8427 DOCREV CUR MEDS BY ELIG CLIN: HCPCS | Performed by: PHYSICIAN ASSISTANT

## 2025-01-29 NOTE — PROGRESS NOTES
remaining systems reviewed and negative    Objective :   There were no vitals filed for this visit.Body mass index is 29.64 kg/m².  General: No acute distress, resting comfortably in the clinic  Neuro: alert. oriented  Eyes: Extra-ocular muscles intact  Pulm: Respirations unlabored and regular.  Skin: warm, well perfused  Psych:   Patient has good fund of knowledge and displays understanding of exam, diagnosis, and plan.  Left Lower Extremity:   Left knee: Previously demonstrated erythema over the medial knee has significantly improved.  No appreciable fluid collection or fluctuance.  Unable to appreciate any obvious joint effusion. Range of motion from 5 to 100 degrees with weakness on resisted extension consistent with previous extensor mechanism repair.  Knee is stable to varus and valgus stress.      No evidence of ecchymosis or abrasions.. Skin intact.  Mild tenderness to palpation of the medial malleolus.  No significant tenderness over the lateral malleolus.  No open wounds.  Scar noted to left knee and hip from prior surgical intervention. . Compartments soft/compressible. Extremity warm/well perfused. EHL/FHL/TA/GSC motor intact. Patient expresses normal sensation L3-S1 distribution     Radiology:  Imaging studies from today were independently reviewed and read as listed below. Any relevant images obtained prior to today's visit were also independently interpreted.       Assessment:   84 y.o. year old female with left bimalleolar ankle fracture  -Follow-up of cellulitis left knee-improved  Plan:   Lengthy discussion had with patient about current clinical state.    Patient is adamant that she would like to avoid surgical intervention loss in this department and that her left ankle is overall feeling quite well from a pain perspective.    Patient is concerned due to feelings of instability despite extensive physical therapy in the rehab center as well as outpatient PT.  She states that her pain is

## 2025-01-31 ENCOUNTER — HOSPITAL ENCOUNTER (OUTPATIENT)
Dept: WOUND CARE | Age: 85
Discharge: HOME | End: 2025-01-31
Payer: MEDICARE

## 2025-01-31 VITALS
SYSTOLIC BLOOD PRESSURE: 138 MMHG | HEART RATE: 86 BPM | TEMPERATURE: 98.4 F | RESPIRATION RATE: 17 BRPM | DIASTOLIC BLOOD PRESSURE: 65 MMHG | BODY MASS INDEX: 29.56 KG/M2 | HEIGHT: 57 IN | WEIGHT: 137 LBS

## 2025-01-31 DIAGNOSIS — L89.323 DECUBITUS ULCER OF LEFT BUTTOCK, STAGE 3 (HCC): Primary | ICD-10-CM

## 2025-01-31 PROCEDURE — 11042 DBRDMT SUBQ TIS 1ST 20SQCM/<: CPT

## 2025-01-31 RX ORDER — GENTAMICIN SULFATE 1 MG/G
OINTMENT TOPICAL ONCE
OUTPATIENT
Start: 2025-01-31 | End: 2025-01-31

## 2025-01-31 RX ORDER — GINSENG 100 MG
CAPSULE ORAL ONCE
OUTPATIENT
Start: 2025-01-31 | End: 2025-01-31

## 2025-01-31 RX ORDER — BACITRACIN ZINC AND POLYMYXIN B SULFATE 500; 1000 [USP'U]/G; [USP'U]/G
OINTMENT TOPICAL ONCE
OUTPATIENT
Start: 2025-01-31 | End: 2025-01-31

## 2025-01-31 RX ORDER — SILVER SULFADIAZINE 10 MG/G
CREAM TOPICAL ONCE
OUTPATIENT
Start: 2025-01-31 | End: 2025-01-31

## 2025-01-31 RX ORDER — DOXYCYCLINE HYCLATE 100 MG
100 TABLET ORAL 2 TIMES DAILY
Qty: 180 TABLET | Refills: 0 | Status: SHIPPED | OUTPATIENT
Start: 2025-01-31 | End: 2025-05-01

## 2025-01-31 RX ORDER — LIDOCAINE HYDROCHLORIDE 40 MG/ML
SOLUTION TOPICAL ONCE
OUTPATIENT
Start: 2025-01-31 | End: 2025-01-31

## 2025-01-31 RX ORDER — TRIAMCINOLONE ACETONIDE 1 MG/G
OINTMENT TOPICAL ONCE
OUTPATIENT
Start: 2025-01-31 | End: 2025-01-31

## 2025-01-31 RX ORDER — LIDOCAINE 40 MG/G
CREAM TOPICAL ONCE
OUTPATIENT
Start: 2025-01-31 | End: 2025-01-31

## 2025-01-31 RX ORDER — MUPIROCIN 20 MG/G
OINTMENT TOPICAL ONCE
OUTPATIENT
Start: 2025-01-31 | End: 2025-01-31

## 2025-01-31 RX ORDER — SODIUM CHLOR/HYPOCHLOROUS ACID 0.033 %
SOLUTION, IRRIGATION IRRIGATION ONCE
OUTPATIENT
Start: 2025-01-31 | End: 2025-01-31

## 2025-01-31 RX ORDER — BETAMETHASONE DIPROPIONATE 0.5 MG/G
CREAM TOPICAL ONCE
OUTPATIENT
Start: 2025-01-31 | End: 2025-01-31

## 2025-01-31 RX ORDER — NEOMYCIN/BACITRACIN/POLYMYXINB 3.5-400-5K
OINTMENT (GRAM) TOPICAL ONCE
OUTPATIENT
Start: 2025-01-31 | End: 2025-01-31

## 2025-01-31 RX ORDER — LIDOCAINE HYDROCHLORIDE 20 MG/ML
JELLY TOPICAL ONCE
OUTPATIENT
Start: 2025-01-31 | End: 2025-01-31

## 2025-01-31 RX ORDER — CLOBETASOL PROPIONATE 0.5 MG/G
OINTMENT TOPICAL ONCE
OUTPATIENT
Start: 2025-01-31 | End: 2025-01-31

## 2025-01-31 RX ORDER — LIDOCAINE 50 MG/G
OINTMENT TOPICAL ONCE
OUTPATIENT
Start: 2025-01-31 | End: 2025-01-31

## 2025-01-31 RX ORDER — LIDOCAINE HYDROCHLORIDE 40 MG/ML
SOLUTION TOPICAL ONCE
Status: COMPLETED | OUTPATIENT
Start: 2025-01-31 | End: 2025-01-31

## 2025-01-31 RX ADMIN — LIDOCAINE HYDROCHLORIDE 5 ML: 40 SOLUTION TOPICAL at 09:05

## 2025-01-31 ASSESSMENT — PAIN SCALES - GENERAL: PAINLEVEL_OUTOF10: 0

## 2025-01-31 NOTE — PROGRESS NOTES
tablet 2    ondansetron (ZOFRAN-ODT) 4 MG disintegrating tablet Take 1 tablet by mouth 3 times daily as needed for Nausea or Vomiting 21 tablet 0    glipiZIDE (GLUCOTROL) 5 MG tablet Take 0.5 tablets by mouth 2 times daily (before meals) prescribed by Dr. Roy's office (Patient taking differently: Take 0.5 tablets by mouth 2 times daily 1 + 1/2) 60 tablet 0    midodrine (PROAMATINE) 2.5 MG tablet Take 1 tablet by mouth 3 times daily Every 8 hrs prn  6/25/24 Per [facility], they have have never had a prescription for this      ipratropium (ATROVENT) 0.02 % nebulizer solution Inhale 2.5 mLs into the lungs      Multiple Vitamins-Minerals (THERAPEUTIC MULTIVITAMIN-MINERALS) tablet Take 1 tablet by mouth daily      omeprazole (PRILOSEC) 20 MG delayed release capsule Take 1 capsule by mouth daily      senna (SENOKOT) 8.6 MG tablet Take 1 tablet by mouth 2 times daily For constipation      aspirin 81 MG chewable tablet Take 1 tablet by mouth daily 30 tablet 0    diclofenac sodium (VOLTAREN) 1 % GEL Apply 4 g topically 2 times daily      ranolazine (RANEXA) 500 MG extended release tablet Take 1 tablet by mouth 2 times daily 60 tablet 1    insulin glargine (LANTUS SOLOSTAR) 100 UNIT/ML injection pen Inject 10 Units into the skin nightly 5 Adjustable Dose Pre-filled Pen Syringe 1    Insulin Pen Needle ([employer] PEN NEEDLES 29G) 29G X 12MM MISC 1 each by Does not apply route daily 100 each 3    Continuous Blood Gluc Sensor (FREESTYLE DAVIS 14 DAY SENSOR) MISC       CRANBERRY PO Take by mouth 2 times daily      lidocaine 2 % uro-jet Apply topically as needed for Pain (Patient not taking: Reported on 1/31/2025) 3 each 3     Current Facility-Administered Medications on File Prior to Encounter   Medication Dose Route Frequency Provider Last Rate Last Admin    lidocaine (XYLOCAINE) 2 % uro-jet   Topical Once         0.9 % sodium chloride infusion 250 mL  250 mL IntraVENous Once Treasure Wolff MD           REVIEW OF

## 2025-02-12 NOTE — DISCHARGE INSTRUCTIONS
[facility] WOUND and HYPERBARIC TREATMENT  CENTER                            Visit  Discharge Instructions / Physician Orders  DATE: 2/14/2025     Home Care:[facility] 2 x per week     SUPPLIES ORDERED THRU: Home care to order supplies due to medicare guidelines                        Wound Location:  Left buttock     Cleanse with: Liquid antibacterial soap and water, rinse well      Dressing Orders:   Triad cream to bottom daily and as needed                         Frequency:   daily and as needed      Additional Orders: Increase protein to diet (meat, cheese, eggs, fish, peanut butter, nuts and beans)  Multivitamin daily   Will measure for compression wraps Juxta fit wraps at next appointment  OFFLOADING [x] YES  TYPE:                  [] NA  GEL cushion (get off amazon)  Weekly wound care visits until determined otherwise.     Antibiotic therapy-wound care related YES [x] NO [] NA[]  Doxycycline 100 mg 2 x per day for chronic suppression-New order sent to [facility]  Patient states she has enough  Lotrimin cream around SP cath site daily-stop powder for now  MY CHART []     Smart Device  []      HYPERBARIC TREATMENT-                TREATMENT #                          Your next appointment with the Wound Care Center is in  3 weeks                                                                                                   (Please note your next appointment above and if you are unable to keep, kindly give a 24 hour notice. Thank you.)  If more than 15 min late we cannot guarantee you will be seen due to clinician schedule  Per Policy, Excessive cancellation will call for dismissal from program.  If you experience any of the following, please call the Wound Care Center during business hours:  [phone]     * Increase in Pain  * Temperature over 101  * Increase in drainage from your wound  * Drainage with a foul odor  * Bleeding  * Increase in swelling  * Need for compression bandage changes due to slippage,

## 2025-02-14 ENCOUNTER — HOSPITAL ENCOUNTER (OUTPATIENT)
Dept: WOUND CARE | Age: 85
Discharge: HOME | End: 2025-02-14
Payer: MEDICARE

## 2025-02-14 VITALS
BODY MASS INDEX: 29.56 KG/M2 | DIASTOLIC BLOOD PRESSURE: 58 MMHG | HEART RATE: 59 BPM | RESPIRATION RATE: 17 BRPM | WEIGHT: 137 LBS | SYSTOLIC BLOOD PRESSURE: 158 MMHG | TEMPERATURE: 96.7 F | HEIGHT: 57 IN

## 2025-02-14 DIAGNOSIS — L89.323 DECUBITUS ULCER OF LEFT BUTTOCK, STAGE 3 (HCC): Primary | ICD-10-CM

## 2025-02-14 PROCEDURE — 11042 DBRDMT SUBQ TIS 1ST 20SQCM/<: CPT

## 2025-02-14 RX ORDER — BETAMETHASONE DIPROPIONATE 0.5 MG/G
CREAM TOPICAL ONCE
OUTPATIENT
Start: 2025-02-14 | End: 2025-02-14

## 2025-02-14 RX ORDER — TRIAMCINOLONE ACETONIDE 1 MG/G
OINTMENT TOPICAL ONCE
OUTPATIENT
Start: 2025-02-14 | End: 2025-02-14

## 2025-02-14 RX ORDER — SODIUM CHLOR/HYPOCHLOROUS ACID 0.033 %
SOLUTION, IRRIGATION IRRIGATION ONCE
OUTPATIENT
Start: 2025-02-14 | End: 2025-02-14

## 2025-02-14 RX ORDER — NEOMYCIN/BACITRACIN/POLYMYXINB 3.5-400-5K
OINTMENT (GRAM) TOPICAL ONCE
OUTPATIENT
Start: 2025-02-14 | End: 2025-02-14

## 2025-02-14 RX ORDER — BACITRACIN ZINC AND POLYMYXIN B SULFATE 500; 1000 [USP'U]/G; [USP'U]/G
OINTMENT TOPICAL ONCE
OUTPATIENT
Start: 2025-02-14 | End: 2025-02-14

## 2025-02-14 RX ORDER — GINSENG 100 MG
CAPSULE ORAL ONCE
OUTPATIENT
Start: 2025-02-14 | End: 2025-02-14

## 2025-02-14 RX ORDER — LIDOCAINE HYDROCHLORIDE 20 MG/ML
JELLY TOPICAL ONCE
OUTPATIENT
Start: 2025-02-14 | End: 2025-02-14

## 2025-02-14 RX ORDER — CLOBETASOL PROPIONATE 0.5 MG/G
OINTMENT TOPICAL ONCE
OUTPATIENT
Start: 2025-02-14 | End: 2025-02-14

## 2025-02-14 RX ORDER — LIDOCAINE 50 MG/G
OINTMENT TOPICAL ONCE
OUTPATIENT
Start: 2025-02-14 | End: 2025-02-14

## 2025-02-14 RX ORDER — LIDOCAINE HYDROCHLORIDE 40 MG/ML
SOLUTION TOPICAL ONCE
OUTPATIENT
Start: 2025-02-14 | End: 2025-02-14

## 2025-02-14 RX ORDER — LIDOCAINE 40 MG/G
CREAM TOPICAL ONCE
OUTPATIENT
Start: 2025-02-14 | End: 2025-02-14

## 2025-02-14 RX ORDER — MUPIROCIN 20 MG/G
OINTMENT TOPICAL ONCE
OUTPATIENT
Start: 2025-02-14 | End: 2025-02-14

## 2025-02-14 RX ORDER — SILVER SULFADIAZINE 10 MG/G
CREAM TOPICAL ONCE
OUTPATIENT
Start: 2025-02-14 | End: 2025-02-14

## 2025-02-14 RX ORDER — LIDOCAINE HYDROCHLORIDE 40 MG/ML
SOLUTION TOPICAL ONCE
Status: COMPLETED | OUTPATIENT
Start: 2025-02-14 | End: 2025-02-14

## 2025-02-14 RX ORDER — GENTAMICIN SULFATE 1 MG/G
OINTMENT TOPICAL ONCE
OUTPATIENT
Start: 2025-02-14 | End: 2025-02-14

## 2025-02-14 RX ADMIN — LIDOCAINE HYDROCHLORIDE 5 ML: 40 SOLUTION TOPICAL at 09:59

## 2025-02-14 ASSESSMENT — PAIN SCALES - GENERAL: PAINLEVEL_OUTOF10: 0
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
## 2025-02-14 NOTE — PLAN OF CARE
Problem: Pain  Goal: Verbalizes/displays adequate comfort level or baseline comfort level  Outcome: Progressing     Problem: Pressure Ulcer:  Goal: Absence of new pressure ulcer  Description: Absence of new pressure ulcer  Outcome: Progressing     Problem: Falls - Risk of:  Goal: Will remain free from falls  Description: Will remain free from falls  Outcome: Progressing

## 2025-02-18 ENCOUNTER — TELEPHONE (OUTPATIENT)
Age: 85
End: 2025-02-18

## 2025-02-18 DIAGNOSIS — R31.0 GROSS HEMATURIA: Primary | ICD-10-CM

## 2025-02-18 NOTE — TELEPHONE ENCOUNTER
Urine culture order placed by Edwige Kumar.  Called and spoke with nurse Vivian; she reported that the patient is not due for a SP cath change for a couple/few weeks.  She said they can change S/P cath, but wanted to make sure we were aware.  Ok to change S/P cath early?  Faxed order to [phone] to [facility] for Vivian and patient

## 2025-02-18 NOTE — TELEPHONE ENCOUNTER
Vivian, Nurse, from  [facility] called to let us know that patient is complaining of gross hematuria (described as cranberry color or a dark tea) that started this morning.  She admited to pain in her lower right quadrant/side pains and \"pulling pain\" feeling.  Described pains started late last night and persisted through the night, but feels less intense his morning.    Side note, Vivian said patient has been doing well and urine was clean until this morning.  Please advise

## 2025-02-18 NOTE — TELEPHONE ENCOUNTER
Nurse Vivian notified and voiced understanding.  She mentioned tht she told pateint to push fluids and when she was there this evening, patient's urine color returned to normal. She will see her again within next  few days for SP cath change and urine culture.

## 2025-02-20 ENCOUNTER — OFFICE VISIT (OUTPATIENT)
Dept: ORTHOPEDIC SURGERY | Age: 85
End: 2025-02-20

## 2025-02-20 VITALS — RESPIRATION RATE: 14 BRPM | HEIGHT: 57 IN | BODY MASS INDEX: 29.56 KG/M2 | WEIGHT: 137 LBS

## 2025-02-20 DIAGNOSIS — Z96.659 HISTORY OF REVISION OF TOTAL KNEE ARTHROPLASTY: Primary | ICD-10-CM

## 2025-02-20 ASSESSMENT — ENCOUNTER SYMPTOMS
COLOR CHANGE: 0
COUGH: 0
VOMITING: 0
SHORTNESS OF BREATH: 0

## 2025-02-20 NOTE — PROGRESS NOTES
[facility] ORTHOPEDICS  [address] [place], [place] [id number]  Dept: [phone]  Dept Fax: [phone]        Ambulatory Follow Up      Subjective:   Komal Crawford is a 84 y.o. year old female who presents to our office today for routine followup regarding her   1. History of revision of total knee arthroplasty    .    Chief Complaint   Patient presents with    Knee Pain     Left Knee       Date of Surgery:   1/23/2024 - Left total knee arthroplasty revision with OSS  3/8/2024 - Left knee incision and drainage with wound VAC placement  4/2/2024 - Left knee incision and drainage with quad tendon repair and wound closure with Prevena wound VAC.    History of Present Illness  The patient is an 84-year-old female here today for follow-up regarding her left knee periprosthetic distal femur fracture with subsequent OSS hinged knee prosthesis. She underwent irrigation and debridement for a superficial infection and subsequent irrigation and debridement of the joint itself with Dr. Keyes, with the last surgery being on 04/02/2024.     The patient is also being managed by Christo Cedillo PA-C, and Dr. Johnson for a left ankle fracture. Today, she is here for reevaluation of her left knee. She did have a recent CT scan of the left knee completed on 01/23/2025 ordered by Dr. Johnson's office. There was evidence of cellulitis without concern for obvious abscess or infection of the joint. The total knee arthroplasty hardware appears to be in great position at that time. The patient is chronically on doxycycline as recommended by infectious disease and continues to take the medication as prescribed.    She reports persistent soreness in her left knee. She has been massaging and rubbing the area to alleviate the discomfort. She experiences difficulty in straightening and bending the knee, a problem that has been present since the onset of her symptoms. She is

## 2025-02-21 ENCOUNTER — HOSPITAL ENCOUNTER (OUTPATIENT)
Age: 85
Setting detail: SPECIMEN
Discharge: HOME | End: 2025-02-21
Payer: MEDICARE

## 2025-02-21 PROCEDURE — 87186 SC STD MICRODIL/AGAR DIL: CPT

## 2025-02-21 PROCEDURE — 87077 CULTURE AEROBIC IDENTIFY: CPT

## 2025-02-21 PROCEDURE — 87086 URINE CULTURE/COLONY COUNT: CPT

## 2025-02-23 LAB
MICROORGANISM SPEC CULT: ABNORMAL
SPECIMEN DESCRIPTION: ABNORMAL

## 2025-02-25 ENCOUNTER — TELEPHONE (OUTPATIENT)
Dept: ORTHOPEDIC SURGERY | Age: 85
End: 2025-02-25

## 2025-02-25 ENCOUNTER — TELEPHONE (OUTPATIENT)
Dept: ADMINISTRATIVE | Age: 85
End: 2025-02-25

## 2025-02-25 NOTE — TELEPHONE ENCOUNTER
Merle from [facility] is calling to ask about the status of Komal's ankle brace.     Per Komal's chart, a prescription has been provided for [employer] Ankle brace but she has not heard anything back about the brace.    Please call Merle back at [phone]

## 2025-02-25 NOTE — TELEPHONE ENCOUNTER
Prior Auth is needed for a brace according to what patient is stating. She was on the line with [employer] to try and get some assistance on getting the brace through insurance. If someone could please contact to check in on the brace    Please advise

## 2025-02-27 ENCOUNTER — TELEPHONE (OUTPATIENT)
Age: 85
End: 2025-02-27

## 2025-02-27 NOTE — TELEPHONE ENCOUNTER
In regard to below result notes:  [facility] contacted and after checking stated they would be able to do weekly irrigations followed by twice a week irrigations with an order from Dr. Lawson.  Patient was contacted after this was verified and originally stated that [facility] drug was unable to deliver and she had no way of getting the irrigations. Dr. Lawson then with this news recommended her ID doctor be asked to assist with treatment at this point as she is already established for wound care with Dr. Arteaga, wound care clinic contacted and Dr. Arteaga is [place] at this time but wound care clinic would need a typed request from office to even consider. Writer contacted [facility] pharmacy herself and they stated they could Fed Ex the irrigations to patient with a usual cost of around 35$. Patient contacted and was ok with this. Order faxed to [facility] and also to [facility]. Patient will call office to report any issues. She is also aware to stop taking the macrobid but continue with the doxycycline that Dr. Arteaga has her on.       Omega Lawson MD   to Me       2/24/25  9:03 AM   Patient has Klebsiella in urine which is resistant to a lot of antibiotics and patient has a lot of allergies.  Recommend Gentamicin irrigations 30 mL daily for 1 week then twice a week (480 mg Gentamicin in 1 L of 0.9% NS) thereafter.  Please facilitate ordering of this.  February 26, 2025  Me   to Omega Lawson MD  [facility] Clinical Staff       2/26/25  2:43 PM   [facility] returned call would be able to do irrigations daily for a week and then Bi weekly. Spoke with patient and patient states she is not able to  the irrigations from [facility] and has no one that she can ask to do so and they do not deliver. Wants to know if there is any other options as it is just not possible. Please advise. Patient is also still taking both doxycycline and Macrobid daily as prophylaxis.  Omega Lawson MD

## 2025-02-28 DIAGNOSIS — Z86.73 HISTORY OF STROKE: ICD-10-CM

## 2025-02-28 DIAGNOSIS — G25.81 RESTLESS LEGS SYNDROME: ICD-10-CM

## 2025-02-28 NOTE — TELEPHONE ENCOUNTER
LETY Park from [facility], called to let us know that Kaye will not be able to fill gentamicin irrigation prescription until Monday, 3/3/25, and she wanted to let us know that she will not start these until Tuesday, 3/4/25 once received

## 2025-03-03 DIAGNOSIS — Z86.73 HISTORY OF STROKE: ICD-10-CM

## 2025-03-03 RX ORDER — GABAPENTIN 100 MG/1
CAPSULE ORAL
Qty: 30 CAPSULE | Refills: 0 | Status: SHIPPED | OUTPATIENT
Start: 2025-03-03 | End: 2025-06-01

## 2025-03-03 RX ORDER — ATORVASTATIN CALCIUM 10 MG/1
10 TABLET, FILM COATED ORAL DAILY
Qty: 30 TABLET | Refills: 0 | Status: SHIPPED | OUTPATIENT
Start: 2025-03-03

## 2025-03-03 RX ORDER — CLOPIDOGREL BISULFATE 75 MG/1
TABLET ORAL
Qty: 30 TABLET | Refills: 0 | Status: SHIPPED | OUTPATIENT
Start: 2025-03-03

## 2025-03-03 RX ORDER — INSULIN GLARGINE-YFGN 100 [IU]/ML
INJECTION, SOLUTION SUBCUTANEOUS
Qty: 3 ML | Refills: 0 | Status: SHIPPED | OUTPATIENT
Start: 2025-03-03

## 2025-03-03 RX ORDER — ROPINIROLE 4 MG/1
TABLET, FILM COATED ORAL
Qty: 30 TABLET | Refills: 0 | Status: SHIPPED | OUTPATIENT
Start: 2025-03-03

## 2025-03-03 RX ORDER — ERGOCALCIFEROL 1.25 MG/1
CAPSULE, LIQUID FILLED ORAL
Qty: 4 CAPSULE | Refills: 0 | Status: SHIPPED | OUTPATIENT
Start: 2025-03-03

## 2025-03-03 RX ORDER — GLIPIZIDE 5 MG/1
TABLET ORAL
Qty: 30 TABLET | Refills: 0 | Status: SHIPPED | OUTPATIENT
Start: 2025-03-03

## 2025-03-03 RX ORDER — RANOLAZINE 500 MG/1
TABLET, EXTENDED RELEASE ORAL
Qty: 60 TABLET | Refills: 0 | Status: SHIPPED | OUTPATIENT
Start: 2025-03-03

## 2025-03-03 RX ORDER — PANTOPRAZOLE SODIUM 40 MG/1
TABLET, DELAYED RELEASE ORAL
Qty: 30 TABLET | Refills: 0 | Status: SHIPPED | OUTPATIENT
Start: 2025-03-03

## 2025-03-12 ENCOUNTER — OFFICE VISIT (OUTPATIENT)
Dept: ORTHOPEDIC SURGERY | Age: 85
End: 2025-03-12
Payer: MEDICARE

## 2025-03-12 VITALS — WEIGHT: 137 LBS | BODY MASS INDEX: 29.56 KG/M2 | HEIGHT: 57 IN | RESPIRATION RATE: 14 BRPM

## 2025-03-12 DIAGNOSIS — S82.842A BIMALLEOLAR FRACTURE, LEFT, CLOSED, INITIAL ENCOUNTER: Primary | ICD-10-CM

## 2025-03-12 PROCEDURE — 99213 OFFICE O/P EST LOW 20 MIN: CPT | Performed by: PHYSICIAN ASSISTANT

## 2025-03-12 PROCEDURE — 1036F TOBACCO NON-USER: CPT | Performed by: PHYSICIAN ASSISTANT

## 2025-03-12 PROCEDURE — G8417 CALC BMI ABV UP PARAM F/U: HCPCS | Performed by: PHYSICIAN ASSISTANT

## 2025-03-12 PROCEDURE — 1090F PRES/ABSN URINE INCON ASSESS: CPT | Performed by: PHYSICIAN ASSISTANT

## 2025-03-12 PROCEDURE — G8400 PT W/DXA NO RESULTS DOC: HCPCS | Performed by: PHYSICIAN ASSISTANT

## 2025-03-12 PROCEDURE — 1126F AMNT PAIN NOTED NONE PRSNT: CPT | Performed by: PHYSICIAN ASSISTANT

## 2025-03-12 PROCEDURE — 1123F ACP DISCUSS/DSCN MKR DOCD: CPT | Performed by: PHYSICIAN ASSISTANT

## 2025-03-12 PROCEDURE — 1159F MED LIST DOCD IN RCRD: CPT | Performed by: PHYSICIAN ASSISTANT

## 2025-03-12 PROCEDURE — G8427 DOCREV CUR MEDS BY ELIG CLIN: HCPCS | Performed by: PHYSICIAN ASSISTANT

## 2025-03-12 NOTE — PROGRESS NOTES
signed by AC Farah on 3/12/2025 at 10:39 AM    This note is created with the assistance of a speech recognition program.  While intending to generate a document that actually reflects the content of the visit, the document can still have some errors including those of syntax and sound a like substitutions which may escape proof reading.  In such instances, actual meaning can be extrapolated by contextual diversion

## 2025-03-16 NOTE — DISCHARGE INSTRUCTIONS
[facility] WOUND and HYPERBARIC TREATMENT  CENTER                            Visit  Discharge Instructions / Physician Orders  DATE: 3/17/2025     Home Care:[facility] 2 x per week     SUPPLIES ORDERED THRU: Home care to order supplies due to medicare guidelines                        Wound Location:  Left buttock     Cleanse with: Liquid antibacterial soap and water, rinse well      Dressing Orders:   Triad cream to bottom daily and as needed                         Frequency:   daily and as needed      Additional Orders: Increase protein to diet (meat, cheese, eggs, fish, peanut butter, nuts and beans)  Multivitamin daily   Will measure for compression wraps Juxta fit wraps at next appointment  OFFLOADING [x] YES  TYPE:                  [] NA  GEL cushion (get off amazon)  Weekly wound care visits until determined otherwise.     Antibiotic therapy-wound care related YES [x] NO [] NA[]  Doxycycline 100 mg 2 x per day for chronic suppression-continue Doxycycline while getting bladder irrigations  Lotrimin cream around SP cath site daily-stop powder for now  MY CHART []     Smart Device  []      HYPERBARIC TREATMENT-                TREATMENT #                          Your next appointment with the Wound Care Center is in  4 weeks                                                                                                   (Please note your next appointment above and if you are unable to keep, kindly give a 24 hour notice. Thank you.)  If more than 15 min late we cannot guarantee you will be seen due to clinician schedule  Per Policy, Excessive cancellation will call for dismissal from program.  If you experience any of the following, please call the Wound Care Center during business hours:  [phone]     * Increase in Pain  * Temperature over 101  * Increase in drainage from your wound  * Drainage with a foul odor  * Bleeding  * Increase in swelling  * Need for compression bandage changes due to slippage,

## 2025-03-17 ENCOUNTER — HOSPITAL ENCOUNTER (OUTPATIENT)
Dept: WOUND CARE | Age: 85
Discharge: HOME | End: 2025-03-17
Payer: MEDICARE

## 2025-03-17 VITALS
TEMPERATURE: 96.5 F | RESPIRATION RATE: 20 BRPM | DIASTOLIC BLOOD PRESSURE: 65 MMHG | SYSTOLIC BLOOD PRESSURE: 157 MMHG | HEART RATE: 60 BPM

## 2025-03-17 DIAGNOSIS — T84.54XS INFECTION OF TOTAL LEFT KNEE REPLACEMENT, SEQUELA: ICD-10-CM

## 2025-03-17 DIAGNOSIS — L89.323 DECUBITUS ULCER OF LEFT BUTTOCK, STAGE 3 (HCC): Primary | ICD-10-CM

## 2025-03-17 LAB
25(OH)D3 SERPL-MCNC: 24.7 NG/ML (ref 30–100)
C PEPTIDE SERPL-MCNC: 2.6 NG/ML (ref 1.1–4.4)
CALCIUM SERPL-MCNC: 9.6 MG/DL (ref 8.6–10.4)
CREAT SERPL-MCNC: 0.9 MG/DL (ref 0.6–0.9)
GFR, ESTIMATED: 63 ML/MIN/1.73M2
PTH-INTACT SERPL-MCNC: 37 PG/ML (ref 15–65)

## 2025-03-17 PROCEDURE — 11042 DBRDMT SUBQ TIS 1ST 20SQCM/<: CPT | Performed by: INTERNAL MEDICINE

## 2025-03-17 PROCEDURE — 11042 DBRDMT SUBQ TIS 1ST 20SQCM/<: CPT

## 2025-03-17 PROCEDURE — 99213 OFFICE O/P EST LOW 20 MIN: CPT | Performed by: INTERNAL MEDICINE

## 2025-03-17 RX ORDER — NEOMYCIN/BACITRACIN/POLYMYXINB 3.5-400-5K
OINTMENT (GRAM) TOPICAL ONCE
OUTPATIENT
Start: 2025-03-17 | End: 2025-03-17

## 2025-03-17 RX ORDER — MUPIROCIN 20 MG/G
OINTMENT TOPICAL ONCE
OUTPATIENT
Start: 2025-03-17 | End: 2025-03-17

## 2025-03-17 RX ORDER — GENTAMICIN SULFATE 1 MG/G
OINTMENT TOPICAL ONCE
OUTPATIENT
Start: 2025-03-17 | End: 2025-03-17

## 2025-03-17 RX ORDER — TRIAMCINOLONE ACETONIDE 1 MG/G
OINTMENT TOPICAL ONCE
OUTPATIENT
Start: 2025-03-17 | End: 2025-03-17

## 2025-03-17 RX ORDER — SODIUM CHLOR/HYPOCHLOROUS ACID 0.033 %
SOLUTION, IRRIGATION IRRIGATION ONCE
OUTPATIENT
Start: 2025-03-17 | End: 2025-03-17

## 2025-03-17 RX ORDER — BACITRACIN ZINC AND POLYMYXIN B SULFATE 500; 1000 [USP'U]/G; [USP'U]/G
OINTMENT TOPICAL ONCE
OUTPATIENT
Start: 2025-03-17 | End: 2025-03-17

## 2025-03-17 RX ORDER — LIDOCAINE 50 MG/G
OINTMENT TOPICAL ONCE
OUTPATIENT
Start: 2025-03-17 | End: 2025-03-17

## 2025-03-17 RX ORDER — LIDOCAINE HYDROCHLORIDE 40 MG/ML
SOLUTION TOPICAL ONCE
Status: DISCONTINUED | OUTPATIENT
Start: 2025-03-17 | End: 2025-03-18 | Stop reason: HOSPADM

## 2025-03-17 RX ORDER — LIDOCAINE HYDROCHLORIDE 20 MG/ML
JELLY TOPICAL ONCE
OUTPATIENT
Start: 2025-03-17 | End: 2025-03-17

## 2025-03-17 RX ORDER — GINSENG 100 MG
CAPSULE ORAL ONCE
OUTPATIENT
Start: 2025-03-17 | End: 2025-03-17

## 2025-03-17 RX ORDER — LIDOCAINE HYDROCHLORIDE 40 MG/ML
SOLUTION TOPICAL ONCE
OUTPATIENT
Start: 2025-03-17 | End: 2025-03-17

## 2025-03-17 RX ORDER — BETAMETHASONE DIPROPIONATE 0.5 MG/G
CREAM TOPICAL ONCE
OUTPATIENT
Start: 2025-03-17 | End: 2025-03-17

## 2025-03-17 RX ORDER — SILVER SULFADIAZINE 10 MG/G
CREAM TOPICAL ONCE
OUTPATIENT
Start: 2025-03-17 | End: 2025-03-17

## 2025-03-17 RX ORDER — LIDOCAINE 40 MG/G
CREAM TOPICAL ONCE
OUTPATIENT
Start: 2025-03-17 | End: 2025-03-17

## 2025-03-17 RX ORDER — CLOBETASOL PROPIONATE 0.5 MG/G
OINTMENT TOPICAL ONCE
OUTPATIENT
Start: 2025-03-17 | End: 2025-03-17

## 2025-03-17 ASSESSMENT — PAIN SCALES - GENERAL: PAINLEVEL_OUTOF10: 0

## 2025-03-17 NOTE — PROGRESS NOTES
furosemide (LASIX) 20 MG tablet TAKE ONE (1) TABLET BY MOUTH DAILY 30 tablet 3    metoprolol succinate (TOPROL XL) 25 MG extended release tablet       tiZANidine (ZANAFLEX) 2 MG tablet       solifenacin (VESICARE) 10 MG tablet Take 1 tablet by mouth daily 30 tablet 5    dicyclomine (BENTYL) 10 MG capsule Take 1 capsule by mouth as needed (for bladder smasms)      ondansetron (ZOFRAN-ODT) 4 MG disintegrating tablet Take 1 tablet by mouth 3 times daily as needed for Nausea or Vomiting 21 tablet 0    midodrine (PROAMATINE) 2.5 MG tablet Take 1 tablet by mouth 3 times daily Every 8 hrs prn  6/25/24 Per [facility], they have have never had a prescription for this      ipratropium (ATROVENT) 0.02 % nebulizer solution Inhale 2.5 mLs into the lungs      Multiple Vitamins-Minerals (THERAPEUTIC MULTIVITAMIN-MINERALS) tablet Take 1 tablet by mouth daily      omeprazole (PRILOSEC) 20 MG delayed release capsule Take 1 capsule by mouth daily      senna (SENOKOT) 8.6 MG tablet Take 1 tablet by mouth 2 times daily For constipation      aspirin 81 MG chewable tablet Take 1 tablet by mouth daily 30 tablet 0    insulin glargine (LANTUS SOLOSTAR) 100 UNIT/ML injection pen Inject 10 Units into the skin nightly 5 Adjustable Dose Pre-filled Pen Syringe 1    CRANBERRY PO Take by mouth 2 times daily      Insulin Glargine-yfgn 100 UNIT/ML SOPN INJECT 10 UNIT SUBCUTANEOUSLY AT BEDTIME FOR DM 3 mL 0    pantoprazole (PROTONIX) 40 MG tablet GIVE ONE (1) TABLET BY MOUTH ONE TIME A DAY FOR GERD DO NOT INTERCHANGE (Patient not taking: Reported on 3/17/2025) 30 tablet 0    doxycycline hyclate (VIBRA-TABS) 100 MG tablet Take 1 tablet by mouth 2 times daily (Patient not taking: Reported on 3/17/2025) 180 tablet 0    Misc. Devices MISC [employer] Ankle brace, left 1 each 0    lidocaine 2 % uro-jet Apply topically as needed for Pain (Patient not taking: Reported on 1/31/2025) 3 each 3    fluconazole (DIFLUCAN) 200 MG tablet  (Patient not taking: Reported

## 2025-03-31 RX ORDER — INSULIN GLARGINE-YFGN 100 [IU]/ML
INJECTION, SOLUTION SUBCUTANEOUS
Qty: 3 ML | Refills: 0 | Status: SHIPPED | OUTPATIENT
Start: 2025-03-31

## 2025-04-03 ENCOUNTER — HOSPITAL ENCOUNTER (OUTPATIENT)
Age: 85
Discharge: HOME | End: 2025-04-03
Payer: MEDICARE

## 2025-04-03 VITALS
RESPIRATION RATE: 18 BRPM | OXYGEN SATURATION: 99 % | SYSTOLIC BLOOD PRESSURE: 133 MMHG | BODY MASS INDEX: 30.07 KG/M2 | DIASTOLIC BLOOD PRESSURE: 76 MMHG | TEMPERATURE: 98.4 F | WEIGHT: 139 LBS | HEART RATE: 68 BPM

## 2025-04-03 DIAGNOSIS — Z01.818 PRE-OP TESTING: Primary | ICD-10-CM

## 2025-04-03 LAB
ABO + RH BLD: NORMAL
ALBUMIN SERPL-MCNC: 3.8 G/DL (ref 3.5–5.2)
ALBUMIN/GLOB SERPL: 1.2 {RATIO} (ref 1–2.5)
ALP SERPL-CCNC: 100 U/L (ref 35–104)
ALT SERPL-CCNC: 14 U/L (ref 10–35)
ANION GAP SERPL CALCULATED.3IONS-SCNC: 11 MMOL/L (ref 9–16)
ARM BAND NUMBER: NORMAL
AST SERPL-CCNC: 19 U/L (ref 10–35)
BASOPHILS # BLD: 0.06 K/UL (ref 0–0.2)
BASOPHILS NFR BLD: 1 % (ref 0–2)
BILIRUB SERPL-MCNC: 0.5 MG/DL (ref 0–1.2)
BLOOD BANK SAMPLE EXPIRATION: NORMAL
BLOOD GROUP ANTIBODIES SERPL: NEGATIVE
BUN SERPL-MCNC: 23 MG/DL (ref 8–23)
CALCIUM SERPL-MCNC: 9.8 MG/DL (ref 8.6–10.4)
CHLORIDE SERPL-SCNC: 103 MMOL/L (ref 98–107)
CO2 SERPL-SCNC: 24 MMOL/L (ref 20–31)
CREAT SERPL-MCNC: 0.9 MG/DL (ref 0.6–0.9)
EOSINOPHIL # BLD: 0.29 K/UL (ref 0–0.44)
EOSINOPHILS RELATIVE PERCENT: 5 % (ref 1–4)
ERYTHROCYTE [DISTWIDTH] IN BLOOD BY AUTOMATED COUNT: 16.1 % (ref 11.8–14.4)
GFR, ESTIMATED: 63 ML/MIN/1.73M2
GLUCOSE SERPL-MCNC: 51 MG/DL (ref 74–99)
HCT VFR BLD AUTO: 36.2 % (ref 36.3–47.1)
HGB BLD-MCNC: 10.9 G/DL (ref 11.9–15.1)
IMM GRANULOCYTES # BLD AUTO: 0.04 K/UL (ref 0–0.3)
IMM GRANULOCYTES NFR BLD: 1 %
LYMPHOCYTES NFR BLD: 1.47 K/UL (ref 1.1–3.7)
LYMPHOCYTES RELATIVE PERCENT: 23 % (ref 24–43)
MCH RBC QN AUTO: 27 PG (ref 25.2–33.5)
MCHC RBC AUTO-ENTMCNC: 30.1 G/DL (ref 28.4–34.8)
MCV RBC AUTO: 89.6 FL (ref 82.6–102.9)
MONOCYTES NFR BLD: 0.49 K/UL (ref 0.1–1.2)
MONOCYTES NFR BLD: 8 % (ref 3–12)
NEUTROPHILS NFR BLD: 63 % (ref 36–65)
NEUTS SEG NFR BLD: 4 K/UL (ref 1.5–8.1)
NRBC BLD-RTO: 0 PER 100 WBC
PLATELET # BLD AUTO: ABNORMAL K/UL (ref 138–453)
PLATELET, FLUORESCENCE: 173 K/UL (ref 138–453)
PLATELETS.RETICULATED NFR BLD AUTO: 19 % (ref 1.1–10.3)
POTASSIUM SERPL-SCNC: 4.2 MMOL/L (ref 3.7–5.3)
PROT SERPL-MCNC: 6.9 G/DL (ref 6.6–8.7)
RBC # BLD AUTO: 4.04 M/UL (ref 3.95–5.11)
RBC # BLD: ABNORMAL 10*6/UL
SODIUM SERPL-SCNC: 138 MMOL/L (ref 136–145)
WBC OTHER # BLD: 6.4 K/UL (ref 3.5–11.3)

## 2025-04-03 PROCEDURE — 80053 COMPREHEN METABOLIC PANEL: CPT

## 2025-04-03 PROCEDURE — 86901 BLOOD TYPING SEROLOGIC RH(D): CPT

## 2025-04-03 PROCEDURE — 86900 BLOOD TYPING SEROLOGIC ABO: CPT

## 2025-04-03 PROCEDURE — 85055 RETICULATED PLATELET ASSAY: CPT

## 2025-04-03 PROCEDURE — 93005 ELECTROCARDIOGRAM TRACING: CPT

## 2025-04-03 PROCEDURE — 86850 RBC ANTIBODY SCREEN: CPT

## 2025-04-03 PROCEDURE — 83036 HEMOGLOBIN GLYCOSYLATED A1C: CPT

## 2025-04-03 PROCEDURE — 85025 COMPLETE CBC W/AUTO DIFF WBC: CPT

## 2025-04-03 PROCEDURE — 36415 COLL VENOUS BLD VENIPUNCTURE: CPT

## 2025-04-03 RX ORDER — CLINDAMYCIN PHOSPHATE 600 MG/50ML
600 INJECTION, SOLUTION INTRAVENOUS ONCE
OUTPATIENT
Start: 2025-04-03

## 2025-04-03 NOTE — DISCHARGE INSTRUCTIONS
Preoperative Instructions: [facility] [address] [place], [place]    Stop eating solid foods at midnight the night prior to your surgery.  Please follow any bowel prep instructions provided to you by your surgeon.     Stop drinking clear liquids at midnight the night prior to your surgery.    Arrive at the surgery center (C entrance) on ___[id number]____________ by _0900-1000am ______________.     Please stop any blood thinning medications as directed by your surgeon or prescribing physician. Failure to stop certain medications may interfere with your scheduled surgery. These may include: Aspirin, Coumadin, Plavix, NSAIDS (Motrin, Aleve, Advil, Mobic, Celebrex), Eliquis, Pradaxa, Xarelto, Fish oil, and herbal supplements.  Please HOLD your PLAVIX x 7 days pprior or as directed by cardiology and surgen. Hold Aspirin as/if directed to do so.   o  You may continue the rest of your medications through the night before surgery unless instructed otherwise.     Day of surgery please take only the following medication(s) with a small sip of water: Ranexa, Metoprolol, Omeprazole   Please shower with provided CHG soap the day before and the morning of surgery.     Please use and bring inhalers the day of surgery. Atrovent inhaler- do nebulizer treatment if need the day of surgery.   Please bring a overnight bag, denture cup.     Reminders:  -If you are going home the day of your procedure, you will need a family member or friend to stay during the procedure and drive you home after your procedure. Your  must be 18 years of age or older and able to sign off on your discharge instructions.    -If you are going home the same day of your surgery, someone must remain with you for the first 24 hours after your surgery if you receive sedation or anesthesia.     -Please do not wear any jewelry, lotions,  or body piercing the day of surgery

## 2025-04-04 ENCOUNTER — ANESTHESIA EVENT (OUTPATIENT)
Dept: OPERATING ROOM | Age: 85
End: 2025-04-04
Payer: MEDICARE

## 2025-04-04 LAB
EKG ATRIAL RATE: 66 BPM
EKG P AXIS: -7 DEGREES
EKG P-R INTERVAL: 150 MS
EKG Q-T INTERVAL: 432 MS
EKG QRS DURATION: 90 MS
EKG QTC CALCULATION (BAZETT): 452 MS
EKG R AXIS: -35 DEGREES
EKG T AXIS: 22 DEGREES
EKG VENTRICULAR RATE: 66 BPM
EST. AVERAGE GLUCOSE BLD GHB EST-MCNC: 140 MG/DL
HBA1C MFR BLD: 6.5 % (ref 4–6)

## 2025-04-10 ENCOUNTER — OFFICE VISIT (OUTPATIENT)
Dept: PRIMARY CARE CLINIC | Age: 85
End: 2025-04-10

## 2025-04-10 VITALS — DIASTOLIC BLOOD PRESSURE: 70 MMHG | SYSTOLIC BLOOD PRESSURE: 110 MMHG | HEART RATE: 84 BPM

## 2025-04-10 DIAGNOSIS — I48.11 LONGSTANDING PERSISTENT ATRIAL FIBRILLATION (HCC): ICD-10-CM

## 2025-04-10 DIAGNOSIS — G20.B1 PARKINSON'S DISEASE WITH DYSKINESIA, UNSPECIFIED WHETHER MANIFESTATIONS FLUCTUATE (HCC): ICD-10-CM

## 2025-04-10 DIAGNOSIS — Z93.59 SUPRAPUBIC CATHETER (HCC): ICD-10-CM

## 2025-04-10 DIAGNOSIS — D69.3 IMMUNE THROMBOCYTOPENIC PURPURA (HCC): ICD-10-CM

## 2025-04-10 DIAGNOSIS — E11.51 TYPE 2 DIABETES MELLITUS WITH DIABETIC PERIPHERAL ANGIOPATHY WITHOUT GANGRENE, WITHOUT LONG-TERM CURRENT USE OF INSULIN (HCC): Primary | ICD-10-CM

## 2025-04-10 RX ORDER — INSULIN GLARGINE 100 [IU]/ML
10 INJECTION, SOLUTION SUBCUTANEOUS DAILY PRN
Qty: 5 ADJUSTABLE DOSE PRE-FILLED PEN SYRINGE | Refills: 1
Start: 2025-04-10

## 2025-04-10 RX ORDER — KETOROLAC TROMETHAMINE 30 MG/ML
INJECTION, SOLUTION INTRAMUSCULAR; INTRAVENOUS
COMMUNITY
Start: 2025-03-06

## 2025-04-10 ASSESSMENT — PATIENT HEALTH QUESTIONNAIRE - PHQ9
SUM OF ALL RESPONSES TO PHQ QUESTIONS 1-9: 0
10. IF YOU CHECKED OFF ANY PROBLEMS, HOW DIFFICULT HAVE THESE PROBLEMS MADE IT FOR YOU TO DO YOUR WORK, TAKE CARE OF THINGS AT HOME, OR GET ALONG WITH OTHER PEOPLE: NOT DIFFICULT AT ALL
3. TROUBLE FALLING OR STAYING ASLEEP: NOT AT ALL
SUM OF ALL RESPONSES TO PHQ QUESTIONS 1-9: 0
4. FEELING TIRED OR HAVING LITTLE ENERGY: NOT AT ALL
8. MOVING OR SPEAKING SO SLOWLY THAT OTHER PEOPLE COULD HAVE NOTICED. OR THE OPPOSITE, BEING SO FIGETY OR RESTLESS THAT YOU HAVE BEEN MOVING AROUND A LOT MORE THAN USUAL: NOT AT ALL
SUM OF ALL RESPONSES TO PHQ QUESTIONS 1-9: 0
9. THOUGHTS THAT YOU WOULD BE BETTER OFF DEAD, OR OF HURTING YOURSELF: NOT AT ALL
1. LITTLE INTEREST OR PLEASURE IN DOING THINGS: NOT AT ALL
5. POOR APPETITE OR OVEREATING: NOT AT ALL
7. TROUBLE CONCENTRATING ON THINGS, SUCH AS READING THE NEWSPAPER OR WATCHING TELEVISION: NOT AT ALL
6. FEELING BAD ABOUT YOURSELF - OR THAT YOU ARE A FAILURE OR HAVE LET YOURSELF OR YOUR FAMILY DOWN: NOT AT ALL
2. FEELING DOWN, DEPRESSED OR HOPELESS: NOT AT ALL
SUM OF ALL RESPONSES TO PHQ QUESTIONS 1-9: 0

## 2025-04-10 ASSESSMENT — ENCOUNTER SYMPTOMS
COUGH: 1
CHEST TIGHTNESS: 0
SHORTNESS OF BREATH: 0

## 2025-04-10 NOTE — PROGRESS NOTES
surgical    Body mass index 31.0-31.9, adult     Bronchitis     Dr. Lynch- pulmonology    Caffeine use     2 coffee/day, 1-2 tea per week    Cataract extraction status of eye, left     Cataract extraction status of right eye     Cellulitis of left lower limb     Cerebral artery occlusion with cerebral infarction (HCC)     [id number]    Chronic anticoagulation     Chronic back pain     Chronic ITP (idiopathic thrombocytopenia) (HCC)     [facility]-  Hematology- yearly monitored    Constipation, unspecified     CVA (cerebral infarction) 2008, 2010    balance issues, short term memory loss    Cyst of kidney, acquired     Deficiency of other specified B group vitamins     Diabetic gastroparesis (HCC)     Diaphragmatic hernia without obstruction or gangrene     Diverticular disease 1986    Diverticulosis of intestine without perforation or abscess without bleeding     Dorsalgia, unspecified     Essential tremor     Exudative age-related macular degeneration, left eye, with active choroidal neovascularization (HCC)     Full dentures     full upper- partial lower    Gastroesophageal reflux disease without esophagitis     GERD (gastroesophageal reflux disease)     Hiatal hernia     Hip fracture (HCC)     History of blood transfusion     History of decreased platelet count     Hyperlipemia     Hypertension     Immune thrombocytopenic purpura (HCC)     Internal hemorrhoid     Localized edema     Long term (current) use of anticoagulants     Long term (current) use of oral hypoglycemic drugs     Long term current use of aspirin     Long term current use of insulin (HCC)     Macular degeneration     Macular degeneration of left eye 1980's    S/P laser treatment    Major depressive disorder, recurrent, in remission     Mixed incontinence     Nausea and vomiting     Neuromuscular dysfunction of bladder, unspecified     feet    Neuropathy     Obesity, unspecified     Occlusion and stenosis of unspecified cerebral artery

## 2025-04-11 ENCOUNTER — TELEPHONE (OUTPATIENT)
Dept: PRIMARY CARE CLINIC | Age: 85
End: 2025-04-11

## 2025-04-11 NOTE — DISCHARGE INSTRUCTIONS
[facility] WOUND and HYPERBARIC TREATMENT  CENTER                            Visit  Discharge Instructions / Physician Orders  DATE: 4/14/2025     Home Care:[facility] 2 x per week     SUPPLIES ORDERED THRU: Home care to order supplies due to medicare guidelines                        Wound Location:  Left buttock     Cleanse with: Liquid antibacterial soap and water, rinse well      Dressing Orders:   Triad cream to bottom daily and as needed                         Frequency:   daily and as needed      Additional Orders: Increase protein to diet (meat, cheese, eggs, fish, peanut butter, nuts and beans)  Multivitamin daily   Will measure for compression wraps Juxta fit wraps at next appointment  OFFLOADING [x] YES  TYPE:                  [] NA  GEL cushion (get off amazon)  Weekly wound care visits until determined otherwise.     Antibiotic therapy-wound care related YES [x] NO [] NA[]  Doxycycline 100 mg 2 x per day for chronic suppression-continue Doxycycline while getting bladder irrigations per Dr. Arteaga  Lotrimin cream around SP cath site daily-stop powder for now  MY CHART []     Smart Device  []      HYPERBARIC TREATMENT-                TREATMENT #                          Your next appointment with the Wound Care Center is in  4 weeks                                                                                                   (Please note your next appointment above and if you are unable to keep, kindly give a 24 hour notice. Thank you.)  If more than 15 min late we cannot guarantee you will be seen due to clinician schedule  Per Policy, Excessive cancellation will call for dismissal from program.  If you experience any of the following, please call the Wound Care Center during business hours:  [phone]     * Increase in Pain  * Temperature over 101  * Increase in drainage from your wound  * Drainage with a foul odor  * Bleeding  * Increase in swelling  * Need for compression bandage changes due to

## 2025-04-11 NOTE — TELEPHONE ENCOUNTER
Beatrice from [facility] called in wanting to let you know Patient had a wound on her Lft side buttocks that has healed but now has one of her Rt side buttocks. Will continue with the same treatment of Triad cream.

## 2025-04-14 ENCOUNTER — HOSPITAL ENCOUNTER (OUTPATIENT)
Dept: WOUND CARE | Age: 85
Discharge: HOME | End: 2025-04-14
Payer: MEDICARE

## 2025-04-14 VITALS
WEIGHT: 139 LBS | RESPIRATION RATE: 18 BRPM | HEIGHT: 57 IN | DIASTOLIC BLOOD PRESSURE: 62 MMHG | BODY MASS INDEX: 29.99 KG/M2 | SYSTOLIC BLOOD PRESSURE: 117 MMHG | TEMPERATURE: 97.2 F | HEART RATE: 85 BPM

## 2025-04-14 DIAGNOSIS — L89.323 DECUBITUS ULCER OF LEFT BUTTOCK, STAGE 3 (HCC): ICD-10-CM

## 2025-04-14 DIAGNOSIS — T84.54XD INFECTION ASSOCIATED WITH INTERNAL LEFT KNEE PROSTHESIS, SUBSEQUENT ENCOUNTER: ICD-10-CM

## 2025-04-14 DIAGNOSIS — L89.313 PRESSURE INJURY OF RIGHT BUTTOCK, STAGE 3 (HCC): Primary | ICD-10-CM

## 2025-04-14 PROCEDURE — 11042 DBRDMT SUBQ TIS 1ST 20SQCM/<: CPT | Performed by: INTERNAL MEDICINE

## 2025-04-14 PROCEDURE — 99213 OFFICE O/P EST LOW 20 MIN: CPT | Performed by: INTERNAL MEDICINE

## 2025-04-14 PROCEDURE — 11042 DBRDMT SUBQ TIS 1ST 20SQCM/<: CPT

## 2025-04-14 RX ORDER — BACITRACIN ZINC AND POLYMYXIN B SULFATE 500; 1000 [USP'U]/G; [USP'U]/G
OINTMENT TOPICAL PRN
OUTPATIENT
Start: 2025-04-14

## 2025-04-14 RX ORDER — TRIAMCINOLONE ACETONIDE 1 MG/G
OINTMENT TOPICAL PRN
OUTPATIENT
Start: 2025-04-14

## 2025-04-14 RX ORDER — GINSENG 100 MG
CAPSULE ORAL PRN
OUTPATIENT
Start: 2025-04-14

## 2025-04-14 RX ORDER — LIDOCAINE HYDROCHLORIDE 40 MG/ML
SOLUTION TOPICAL PRN
OUTPATIENT
Start: 2025-04-14

## 2025-04-14 RX ORDER — CLINDAMYCIN HYDROCHLORIDE 150 MG/1
35 CAPSULE ORAL
Status: ON HOLD | COMMUNITY

## 2025-04-14 RX ORDER — GENTAMICIN SULFATE 1 MG/G
OINTMENT TOPICAL PRN
OUTPATIENT
Start: 2025-04-14

## 2025-04-14 RX ORDER — LIDOCAINE HYDROCHLORIDE 40 MG/ML
SOLUTION TOPICAL PRN
Status: DISCONTINUED | OUTPATIENT
Start: 2025-04-14 | End: 2025-04-15 | Stop reason: HOSPADM

## 2025-04-14 RX ORDER — SILVER SULFADIAZINE 10 MG/G
CREAM TOPICAL PRN
OUTPATIENT
Start: 2025-04-14

## 2025-04-14 RX ORDER — CLOBETASOL PROPIONATE 0.5 MG/G
OINTMENT TOPICAL PRN
OUTPATIENT
Start: 2025-04-14

## 2025-04-14 RX ORDER — SODIUM CHLOR/HYPOCHLOROUS ACID 0.033 %
SOLUTION, IRRIGATION IRRIGATION PRN
OUTPATIENT
Start: 2025-04-14

## 2025-04-14 RX ORDER — LIDOCAINE HYDROCHLORIDE 20 MG/ML
JELLY TOPICAL PRN
OUTPATIENT
Start: 2025-04-14

## 2025-04-14 RX ORDER — LIDOCAINE 50 MG/G
OINTMENT TOPICAL PRN
OUTPATIENT
Start: 2025-04-14

## 2025-04-14 RX ORDER — NEOMYCIN/BACITRACIN/POLYMYXINB 3.5-400-5K
OINTMENT (GRAM) TOPICAL PRN
OUTPATIENT
Start: 2025-04-14

## 2025-04-14 RX ORDER — BETAMETHASONE DIPROPIONATE 0.5 MG/G
CREAM TOPICAL PRN
OUTPATIENT
Start: 2025-04-14

## 2025-04-14 RX ORDER — MUPIROCIN 20 MG/G
OINTMENT TOPICAL PRN
OUTPATIENT
Start: 2025-04-14

## 2025-04-14 RX ORDER — LIDOCAINE 40 MG/G
CREAM TOPICAL PRN
OUTPATIENT
Start: 2025-04-14

## 2025-04-14 RX ADMIN — LIDOCAINE HYDROCHLORIDE 5 ML: 40 SOLUTION TOPICAL at 09:37

## 2025-04-14 ASSESSMENT — PAIN DESCRIPTION - ORIENTATION: ORIENTATION: LEFT

## 2025-04-14 ASSESSMENT — PAIN DESCRIPTION - ONSET: ONSET: ON-GOING

## 2025-04-14 ASSESSMENT — PAIN DESCRIPTION - DESCRIPTORS: DESCRIPTORS: ACHING;SORE

## 2025-04-14 ASSESSMENT — PAIN DESCRIPTION - LOCATION: LOCATION: BUTTOCKS

## 2025-04-14 ASSESSMENT — PAIN DESCRIPTION - PAIN TYPE: TYPE: CHRONIC PAIN

## 2025-04-14 ASSESSMENT — PAIN - FUNCTIONAL ASSESSMENT: PAIN_FUNCTIONAL_ASSESSMENT: PREVENTS OR INTERFERES SOME ACTIVE ACTIVITIES AND ADLS

## 2025-04-14 ASSESSMENT — PAIN DESCRIPTION - FREQUENCY: FREQUENCY: INTERMITTENT

## 2025-04-14 ASSESSMENT — PAIN SCALES - GENERAL: PAINLEVEL_OUTOF10: 3

## 2025-04-14 NOTE — PROGRESS NOTES
[facility] Wound Care Center   Progress Note and Procedure Note      Koaml Crawford  MEDICAL RECORD NUMBER:  [id number]  AGE: 84 y.o.   GENDER: female  : 1940  EPISODE DATE:  2025    Subjective:     Chief Complaint   Patient presents with    Wound Check     Right buttock         HISTORY of PRESENT ILLNESS HPI     Komal Crawford is a 84 y.o. female who presents today for wound/ulcer evaluation.   History of Wound Context: The patient had a new right buttock ulcer with no purulent drainage, no redness.  Left buttock ulcer healed.  She had irritation to the perirectal area due to stool incontinence.  The patient is going for colostomy next week.  She denied fever or chills, no new complaints  The patient has been on gentamicin antibiotics bladder irrigation by urology, suprapubic catheter in place with clear urine.    The patient had left knee wound that was healed, mild swelling to the left knee, no significant redness.    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    The patient was hospitalized 2024 through 2024 S/p I & D left knee wound, repair of quadriceps tendon, wound lavavge and closure 24     The patient was hospitalized previously at [facility] 3/6/2024  through 3/12/2024 for left knee wound and TKA infection status post irrigation and debridement with application of vancomycin powder and wound VAC with hardware retention.  The patient was discharged on IV vancomycin through 2024  She received a course of meropenem 3/6/2024 through 3/11/2024 for UTI      Ulcer Identification:  Ulcer Type: Pressure    Contributing Factors: diabetes and decreased mobility    Status post left total hip arthroplasty/displaced periprosthetic femur fracture status post left total knee arthroplasty revision 24   Cephalosporin, sulfa penicillin, Cipro, doxycycline and clindamycin allergy

## 2025-04-18 ENCOUNTER — HOSPITAL ENCOUNTER (INPATIENT)
Age: 85
LOS: 7 days | Discharge: NURSING, REHAB OR LONG-TERM CARE | End: 2025-04-25
Attending: SURGERY | Admitting: SURGERY
Payer: MEDICARE

## 2025-04-18 ENCOUNTER — ANESTHESIA (OUTPATIENT)
Dept: OPERATING ROOM | Age: 85
End: 2025-04-18
Payer: MEDICARE

## 2025-04-18 DIAGNOSIS — N32.89 BLADDER SPASMS: ICD-10-CM

## 2025-04-18 DIAGNOSIS — R33.9 URINARY RETENTION: ICD-10-CM

## 2025-04-18 DIAGNOSIS — N31.9 NEUROGENIC BLADDER: ICD-10-CM

## 2025-04-18 DIAGNOSIS — Z93.3 STATUS POST COLOSTOMY (HCC): Primary | ICD-10-CM

## 2025-04-18 PROBLEM — R32 INCONTINENCE IN FEMALE: Status: ACTIVE | Noted: 2025-04-18

## 2025-04-18 LAB
GLUCOSE BLD-MCNC: 159 MG/DL (ref 65–105)
GLUCOSE BLD-MCNC: 174 MG/DL (ref 65–105)
GLUCOSE BLD-MCNC: 291 MG/DL (ref 65–105)

## 2025-04-18 PROCEDURE — 2709999900 HC NON-CHARGEABLE SUPPLY: Performed by: SURGERY

## 2025-04-18 PROCEDURE — 2700000000 HC OXYGEN THERAPY PER DAY

## 2025-04-18 PROCEDURE — 6360000002 HC RX W HCPCS: Performed by: NURSE ANESTHETIST, CERTIFIED REGISTERED

## 2025-04-18 PROCEDURE — S2900 ROBOTIC SURGICAL SYSTEM: HCPCS | Performed by: SURGERY

## 2025-04-18 PROCEDURE — 2580000003 HC RX 258: Performed by: SURGERY

## 2025-04-18 PROCEDURE — 3600000009 HC SURGERY ROBOT BASE: Performed by: SURGERY

## 2025-04-18 PROCEDURE — 2580000003 HC RX 258: Performed by: ANESTHESIOLOGY

## 2025-04-18 PROCEDURE — 6360000002 HC RX W HCPCS: Performed by: SURGERY

## 2025-04-18 PROCEDURE — 2720000010 HC SURG SUPPLY STERILE: Performed by: SURGERY

## 2025-04-18 PROCEDURE — 1200000000 HC SEMI PRIVATE

## 2025-04-18 PROCEDURE — 99223 1ST HOSP IP/OBS HIGH 75: CPT | Performed by: INTERNAL MEDICINE

## 2025-04-18 PROCEDURE — 3600000019 HC SURGERY ROBOT ADDTL 15MIN: Performed by: SURGERY

## 2025-04-18 PROCEDURE — 2580000003 HC RX 258: Performed by: INTERNAL MEDICINE

## 2025-04-18 PROCEDURE — 2500000003 HC RX 250 WO HCPCS: Performed by: SURGERY

## 2025-04-18 PROCEDURE — 2580000003 HC RX 258: Performed by: NURSE PRACTITIONER

## 2025-04-18 PROCEDURE — 0DNW0ZZ RELEASE PERITONEUM, OPEN APPROACH: ICD-10-PCS | Performed by: SURGERY

## 2025-04-18 PROCEDURE — 8E0W4CZ ROBOTIC ASSISTED PROCEDURE OF TRUNK REGION, PERCUTANEOUS ENDOSCOPIC APPROACH: ICD-10-PCS | Performed by: SURGERY

## 2025-04-18 PROCEDURE — 0D1L0Z4 BYPASS TRANSVERSE COLON TO CUTANEOUS, OPEN APPROACH: ICD-10-PCS | Performed by: SURGERY

## 2025-04-18 PROCEDURE — 3700000000 HC ANESTHESIA ATTENDED CARE: Performed by: SURGERY

## 2025-04-18 PROCEDURE — 3700000001 HC ADD 15 MINUTES (ANESTHESIA): Performed by: SURGERY

## 2025-04-18 PROCEDURE — P9041 ALBUMIN (HUMAN),5%, 50ML: HCPCS | Performed by: NURSE ANESTHETIST, CERTIFIED REGISTERED

## 2025-04-18 PROCEDURE — 82947 ASSAY GLUCOSE BLOOD QUANT: CPT

## 2025-04-18 PROCEDURE — 3E0T3BZ INTRODUCTION OF ANESTHETIC AGENT INTO PERIPHERAL NERVES AND PLEXI, PERCUTANEOUS APPROACH: ICD-10-PCS | Performed by: SURGERY

## 2025-04-18 PROCEDURE — 7100000001 HC PACU RECOVERY - ADDTL 15 MIN: Performed by: SURGERY

## 2025-04-18 PROCEDURE — 94761 N-INVAS EAR/PLS OXIMETRY MLT: CPT

## 2025-04-18 PROCEDURE — 2500000003 HC RX 250 WO HCPCS: Performed by: NURSE ANESTHETIST, CERTIFIED REGISTERED

## 2025-04-18 PROCEDURE — 6370000000 HC RX 637 (ALT 250 FOR IP): Performed by: SURGERY

## 2025-04-18 PROCEDURE — 7100000000 HC PACU RECOVERY - FIRST 15 MIN: Performed by: SURGERY

## 2025-04-18 PROCEDURE — 0D1M4Z4 BYPASS DESCENDING COLON TO CUTANEOUS, PERCUTANEOUS ENDOSCOPIC APPROACH: ICD-10-PCS | Performed by: SURGERY

## 2025-04-18 RX ORDER — ONDANSETRON 2 MG/ML
INJECTION INTRAMUSCULAR; INTRAVENOUS
Status: DISCONTINUED | OUTPATIENT
Start: 2025-04-18 | End: 2025-04-18 | Stop reason: SDUPTHER

## 2025-04-18 RX ORDER — MIDODRINE HYDROCHLORIDE 2.5 MG/1
2.5 TABLET ORAL
Status: DISCONTINUED | OUTPATIENT
Start: 2025-04-18 | End: 2025-04-21

## 2025-04-18 RX ORDER — FENTANYL CITRATE 50 UG/ML
INJECTION, SOLUTION INTRAMUSCULAR; INTRAVENOUS
Status: DISCONTINUED | OUTPATIENT
Start: 2025-04-18 | End: 2025-04-18 | Stop reason: SDUPTHER

## 2025-04-18 RX ORDER — TROSPIUM CHLORIDE 20 MG/1
20 TABLET, FILM COATED ORAL
Status: DISCONTINUED | OUTPATIENT
Start: 2025-04-18 | End: 2025-04-25 | Stop reason: HOSPADM

## 2025-04-18 RX ORDER — CLINDAMYCIN HYDROCHLORIDE 150 MG/1
150 CAPSULE ORAL
Status: DISCONTINUED | OUTPATIENT
Start: 2025-04-18 | End: 2025-04-25 | Stop reason: HOSPADM

## 2025-04-18 RX ORDER — DEXTROSE MONOHYDRATE 100 MG/ML
INJECTION, SOLUTION INTRAVENOUS CONTINUOUS PRN
Status: DISCONTINUED | OUTPATIENT
Start: 2025-04-18 | End: 2025-04-25 | Stop reason: HOSPADM

## 2025-04-18 RX ORDER — DOXYCYCLINE HYCLATE 100 MG
100 TABLET ORAL 2 TIMES DAILY
Status: DISCONTINUED | OUTPATIENT
Start: 2025-04-18 | End: 2025-04-25 | Stop reason: HOSPADM

## 2025-04-18 RX ORDER — SODIUM CHLORIDE 0.9 % (FLUSH) 0.9 %
5-40 SYRINGE (ML) INJECTION PRN
Status: DISCONTINUED | OUTPATIENT
Start: 2025-04-18 | End: 2025-04-18 | Stop reason: HOSPADM

## 2025-04-18 RX ORDER — SODIUM CHLORIDE 9 MG/ML
INJECTION, SOLUTION INTRAVENOUS CONTINUOUS
Status: ACTIVE | OUTPATIENT
Start: 2025-04-18 | End: 2025-04-19

## 2025-04-18 RX ORDER — CLINDAMYCIN PHOSPHATE 600 MG/50ML
600 INJECTION, SOLUTION INTRAVENOUS ONCE
Status: DISCONTINUED | OUTPATIENT
Start: 2025-04-18 | End: 2025-04-18 | Stop reason: HOSPADM

## 2025-04-18 RX ORDER — MIDODRINE HYDROCHLORIDE 2.5 MG/1
2.5 TABLET ORAL ONCE
Status: COMPLETED | OUTPATIENT
Start: 2025-04-19 | End: 2025-04-19

## 2025-04-18 RX ORDER — SODIUM CHLORIDE, SODIUM LACTATE, POTASSIUM CHLORIDE, CALCIUM CHLORIDE 600; 310; 30; 20 MG/100ML; MG/100ML; MG/100ML; MG/100ML
INJECTION, SOLUTION INTRAVENOUS CONTINUOUS
Status: DISCONTINUED | OUTPATIENT
Start: 2025-04-18 | End: 2025-04-18 | Stop reason: HOSPADM

## 2025-04-18 RX ORDER — MAGNESIUM SULFATE 1 G/100ML
INJECTION INTRAVENOUS
Status: COMPLETED
Start: 2025-04-18 | End: 2025-04-18

## 2025-04-18 RX ORDER — KETOROLAC TROMETHAMINE 30 MG/ML
INJECTION, SOLUTION INTRAMUSCULAR; INTRAVENOUS
Status: DISCONTINUED | OUTPATIENT
Start: 2025-04-18 | End: 2025-04-18 | Stop reason: SDUPTHER

## 2025-04-18 RX ORDER — OXYCODONE HYDROCHLORIDE 5 MG/1
5 TABLET ORAL PRN
Status: DISCONTINUED | OUTPATIENT
Start: 2025-04-18 | End: 2025-04-18 | Stop reason: HOSPADM

## 2025-04-18 RX ORDER — INDOCYANINE GREEN AND WATER 25 MG
KIT INJECTION
Status: COMPLETED
Start: 2025-04-18 | End: 2025-04-18

## 2025-04-18 RX ORDER — BUPIVACAINE HYDROCHLORIDE AND EPINEPHRINE 5; 5 MG/ML; UG/ML
INJECTION, SOLUTION PERINEURAL
Status: DISCONTINUED
Start: 2025-04-18 | End: 2025-04-18

## 2025-04-18 RX ORDER — POLYETHYLENE GLYCOL 3350 17 G/17G
17 POWDER, FOR SOLUTION ORAL DAILY PRN
Status: DISCONTINUED | OUTPATIENT
Start: 2025-04-18 | End: 2025-04-25 | Stop reason: HOSPADM

## 2025-04-18 RX ORDER — SODIUM CHLORIDE 0.9 % (FLUSH) 0.9 %
5-40 SYRINGE (ML) INJECTION EVERY 12 HOURS SCHEDULED
Status: DISCONTINUED | OUTPATIENT
Start: 2025-04-18 | End: 2025-04-18 | Stop reason: HOSPADM

## 2025-04-18 RX ORDER — INSULIN LISPRO 100 [IU]/ML
0-4 INJECTION, SOLUTION INTRAVENOUS; SUBCUTANEOUS
Status: DISCONTINUED | OUTPATIENT
Start: 2025-04-18 | End: 2025-04-25 | Stop reason: HOSPADM

## 2025-04-18 RX ORDER — SODIUM CHLORIDE 0.9 % (FLUSH) 0.9 %
5-40 SYRINGE (ML) INJECTION PRN
Status: DISCONTINUED | OUTPATIENT
Start: 2025-04-18 | End: 2025-04-25 | Stop reason: HOSPADM

## 2025-04-18 RX ORDER — RANOLAZINE 500 MG/1
500 TABLET, EXTENDED RELEASE ORAL 2 TIMES DAILY
Status: DISCONTINUED | OUTPATIENT
Start: 2025-04-18 | End: 2025-04-25 | Stop reason: HOSPADM

## 2025-04-18 RX ORDER — PROPOFOL 10 MG/ML
INJECTION, EMULSION INTRAVENOUS
Status: DISCONTINUED | OUTPATIENT
Start: 2025-04-18 | End: 2025-04-18 | Stop reason: SDUPTHER

## 2025-04-18 RX ORDER — LIDOCAINE HYDROCHLORIDE 10 MG/ML
INJECTION, SOLUTION EPIDURAL; INFILTRATION; INTRACAUDAL; PERINEURAL
Status: DISCONTINUED | OUTPATIENT
Start: 2025-04-18 | End: 2025-04-18 | Stop reason: SDUPTHER

## 2025-04-18 RX ORDER — ACETAMINOPHEN 650 MG/1
650 SUPPOSITORY RECTAL EVERY 6 HOURS PRN
Status: DISCONTINUED | OUTPATIENT
Start: 2025-04-18 | End: 2025-04-25 | Stop reason: HOSPADM

## 2025-04-18 RX ORDER — 0.9 % SODIUM CHLORIDE 0.9 %
1000 INTRAVENOUS SOLUTION INTRAVENOUS ONCE
Status: COMPLETED | OUTPATIENT
Start: 2025-04-18 | End: 2025-04-18

## 2025-04-18 RX ORDER — MAGNESIUM SULFATE IN WATER 40 MG/ML
2000 INJECTION, SOLUTION INTRAVENOUS PRN
Status: DISCONTINUED | OUTPATIENT
Start: 2025-04-18 | End: 2025-04-25 | Stop reason: HOSPADM

## 2025-04-18 RX ORDER — 0.9 % SODIUM CHLORIDE 0.9 %
500 INTRAVENOUS SOLUTION INTRAVENOUS ONCE
Status: COMPLETED | OUTPATIENT
Start: 2025-04-19 | End: 2025-04-19

## 2025-04-18 RX ORDER — DIPHENHYDRAMINE HYDROCHLORIDE 50 MG/ML
12.5 INJECTION, SOLUTION INTRAMUSCULAR; INTRAVENOUS
Status: DISCONTINUED | OUTPATIENT
Start: 2025-04-18 | End: 2025-04-18 | Stop reason: HOSPADM

## 2025-04-18 RX ORDER — CLOPIDOGREL BISULFATE 75 MG/1
75 TABLET ORAL DAILY
Status: DISCONTINUED | OUTPATIENT
Start: 2025-04-18 | End: 2025-04-25 | Stop reason: HOSPADM

## 2025-04-18 RX ORDER — PHENYLEPHRINE HCL IN 0.9% NACL 1 MG/10 ML
SYRINGE (ML) INTRAVENOUS
Status: DISCONTINUED | OUTPATIENT
Start: 2025-04-18 | End: 2025-04-18 | Stop reason: SDUPTHER

## 2025-04-18 RX ORDER — BUPIVACAINE HYDROCHLORIDE AND EPINEPHRINE 2.5; 5 MG/ML; UG/ML
INJECTION, SOLUTION INFILTRATION; PERINEURAL PRN
Status: DISCONTINUED | OUTPATIENT
Start: 2025-04-18 | End: 2025-04-18 | Stop reason: ALTCHOICE

## 2025-04-18 RX ORDER — DEXAMETHASONE SODIUM PHOSPHATE 4 MG/ML
INJECTION, SOLUTION INTRA-ARTICULAR; INTRALESIONAL; INTRAMUSCULAR; INTRAVENOUS; SOFT TISSUE
Status: DISCONTINUED | OUTPATIENT
Start: 2025-04-18 | End: 2025-04-18 | Stop reason: SDUPTHER

## 2025-04-18 RX ORDER — MAGNESIUM SULFATE 1 G/100ML
INJECTION INTRAVENOUS
Status: DISCONTINUED | OUTPATIENT
Start: 2025-04-18 | End: 2025-04-18 | Stop reason: SDUPTHER

## 2025-04-18 RX ORDER — ONDANSETRON 2 MG/ML
4 INJECTION INTRAMUSCULAR; INTRAVENOUS EVERY 6 HOURS PRN
Status: DISCONTINUED | OUTPATIENT
Start: 2025-04-18 | End: 2025-04-25 | Stop reason: HOSPADM

## 2025-04-18 RX ORDER — POTASSIUM CHLORIDE 7.45 MG/ML
10 INJECTION INTRAVENOUS PRN
Status: DISCONTINUED | OUTPATIENT
Start: 2025-04-18 | End: 2025-04-25 | Stop reason: HOSPADM

## 2025-04-18 RX ORDER — POTASSIUM CHLORIDE 1500 MG/1
40 TABLET, EXTENDED RELEASE ORAL PRN
Status: DISCONTINUED | OUTPATIENT
Start: 2025-04-18 | End: 2025-04-25 | Stop reason: HOSPADM

## 2025-04-18 RX ORDER — ROCURONIUM BROMIDE 10 MG/ML
INJECTION, SOLUTION INTRAVENOUS
Status: DISCONTINUED | OUTPATIENT
Start: 2025-04-18 | End: 2025-04-18 | Stop reason: SDUPTHER

## 2025-04-18 RX ORDER — ALBUMIN HUMAN 50 G/1000ML
SOLUTION INTRAVENOUS
Status: DISCONTINUED | OUTPATIENT
Start: 2025-04-18 | End: 2025-04-18 | Stop reason: SDUPTHER

## 2025-04-18 RX ORDER — LIDOCAINE HYDROCHLORIDE 20 MG/ML
JELLY TOPICAL PRN
Status: DISCONTINUED | OUTPATIENT
Start: 2025-04-18 | End: 2025-04-25 | Stop reason: HOSPADM

## 2025-04-18 RX ORDER — OXYCODONE HYDROCHLORIDE 5 MG/1
10 TABLET ORAL PRN
Status: DISCONTINUED | OUTPATIENT
Start: 2025-04-18 | End: 2025-04-18 | Stop reason: HOSPADM

## 2025-04-18 RX ORDER — SODIUM CHLORIDE 9 MG/ML
INJECTION, SOLUTION INTRAVENOUS PRN
Status: DISCONTINUED | OUTPATIENT
Start: 2025-04-18 | End: 2025-04-18 | Stop reason: HOSPADM

## 2025-04-18 RX ORDER — METOPROLOL SUCCINATE 25 MG/1
25 TABLET, EXTENDED RELEASE ORAL DAILY
Status: DISCONTINUED | OUTPATIENT
Start: 2025-04-18 | End: 2025-04-25 | Stop reason: HOSPADM

## 2025-04-18 RX ORDER — ONDANSETRON 2 MG/ML
4 INJECTION INTRAMUSCULAR; INTRAVENOUS
Status: DISCONTINUED | OUTPATIENT
Start: 2025-04-18 | End: 2025-04-18 | Stop reason: HOSPADM

## 2025-04-18 RX ORDER — ENOXAPARIN SODIUM 100 MG/ML
40 INJECTION SUBCUTANEOUS DAILY
Status: DISCONTINUED | OUTPATIENT
Start: 2025-04-18 | End: 2025-04-25 | Stop reason: HOSPADM

## 2025-04-18 RX ORDER — GLUCAGON 1 MG/ML
1 KIT INJECTION PRN
Status: DISCONTINUED | OUTPATIENT
Start: 2025-04-18 | End: 2025-04-25 | Stop reason: HOSPADM

## 2025-04-18 RX ORDER — SODIUM CHLORIDE 0.9 % (FLUSH) 0.9 %
5-40 SYRINGE (ML) INJECTION EVERY 12 HOURS SCHEDULED
Status: DISCONTINUED | OUTPATIENT
Start: 2025-04-18 | End: 2025-04-25 | Stop reason: HOSPADM

## 2025-04-18 RX ORDER — DICYCLOMINE HYDROCHLORIDE 10 MG/1
10 CAPSULE ORAL DAILY PRN
Status: DISCONTINUED | OUTPATIENT
Start: 2025-04-18 | End: 2025-04-25 | Stop reason: HOSPADM

## 2025-04-18 RX ORDER — FUROSEMIDE 20 MG/1
20 TABLET ORAL DAILY
Status: DISCONTINUED | OUTPATIENT
Start: 2025-04-18 | End: 2025-04-25 | Stop reason: HOSPADM

## 2025-04-18 RX ORDER — PANTOPRAZOLE SODIUM 40 MG/1
40 TABLET, DELAYED RELEASE ORAL
Status: DISCONTINUED | OUTPATIENT
Start: 2025-04-19 | End: 2025-04-25 | Stop reason: HOSPADM

## 2025-04-18 RX ORDER — HYDROCODONE BITARTRATE AND ACETAMINOPHEN 5; 325 MG/1; MG/1
1 TABLET ORAL EVERY 4 HOURS PRN
Status: DISCONTINUED | OUTPATIENT
Start: 2025-04-18 | End: 2025-04-19

## 2025-04-18 RX ORDER — ACETAMINOPHEN 325 MG/1
650 TABLET ORAL EVERY 6 HOURS PRN
Status: DISCONTINUED | OUTPATIENT
Start: 2025-04-18 | End: 2025-04-25 | Stop reason: HOSPADM

## 2025-04-18 RX ORDER — GABAPENTIN 100 MG/1
100 CAPSULE ORAL 2 TIMES DAILY
Status: DISCONTINUED | OUTPATIENT
Start: 2025-04-18 | End: 2025-04-25 | Stop reason: HOSPADM

## 2025-04-18 RX ORDER — LIDOCAINE HYDROCHLORIDE 10 MG/ML
1 INJECTION, SOLUTION EPIDURAL; INFILTRATION; INTRACAUDAL; PERINEURAL
Status: DISCONTINUED | OUTPATIENT
Start: 2025-04-18 | End: 2025-04-18 | Stop reason: HOSPADM

## 2025-04-18 RX ORDER — INDOCYANINE GREEN AND WATER 25 MG
KIT INJECTION
Status: DISCONTINUED | OUTPATIENT
Start: 2025-04-18 | End: 2025-04-18 | Stop reason: SDUPTHER

## 2025-04-18 RX ORDER — SODIUM CHLORIDE 9 MG/ML
INJECTION, SOLUTION INTRAVENOUS PRN
Status: DISCONTINUED | OUTPATIENT
Start: 2025-04-18 | End: 2025-04-25 | Stop reason: HOSPADM

## 2025-04-18 RX ORDER — SENNOSIDES A AND B 8.6 MG/1
1 TABLET, FILM COATED ORAL 2 TIMES DAILY
Status: DISCONTINUED | OUTPATIENT
Start: 2025-04-18 | End: 2025-04-25 | Stop reason: HOSPADM

## 2025-04-18 RX ORDER — HYDRALAZINE HYDROCHLORIDE 20 MG/ML
10 INJECTION INTRAMUSCULAR; INTRAVENOUS
Status: DISCONTINUED | OUTPATIENT
Start: 2025-04-18 | End: 2025-04-18 | Stop reason: HOSPADM

## 2025-04-18 RX ORDER — LIDOCAINE HYDROCHLORIDE 20 MG/ML
JELLY TOPICAL ONCE
Status: DISCONTINUED | OUTPATIENT
Start: 2025-04-18 | End: 2025-04-25 | Stop reason: HOSPADM

## 2025-04-18 RX ORDER — NALOXONE HYDROCHLORIDE 0.4 MG/ML
INJECTION, SOLUTION INTRAMUSCULAR; INTRAVENOUS; SUBCUTANEOUS PRN
Status: DISCONTINUED | OUTPATIENT
Start: 2025-04-18 | End: 2025-04-18 | Stop reason: HOSPADM

## 2025-04-18 RX ORDER — LABETALOL HYDROCHLORIDE 5 MG/ML
10 INJECTION, SOLUTION INTRAVENOUS
Status: DISCONTINUED | OUTPATIENT
Start: 2025-04-18 | End: 2025-04-18 | Stop reason: HOSPADM

## 2025-04-18 RX ADMIN — GABAPENTIN 100 MG: 100 CAPSULE ORAL at 21:55

## 2025-04-18 RX ADMIN — Medication 100 MCG: at 14:09

## 2025-04-18 RX ADMIN — SODIUM CHLORIDE 500 ML: 0.9 INJECTION, SOLUTION INTRAVENOUS at 23:58

## 2025-04-18 RX ADMIN — DOXYCYCLINE 200 MG: 100 INJECTION, POWDER, LYOPHILIZED, FOR SOLUTION INTRAVENOUS at 13:57

## 2025-04-18 RX ADMIN — Medication 200 MCG: at 14:24

## 2025-04-18 RX ADMIN — Medication 100 MCG: at 13:20

## 2025-04-18 RX ADMIN — DOXYCYCLINE HYCLATE 100 MG: 100 TABLET, COATED ORAL at 21:56

## 2025-04-18 RX ADMIN — TIZANIDINE 2 MG: 4 TABLET ORAL at 21:56

## 2025-04-18 RX ADMIN — MIDODRINE HYDROCHLORIDE 2.5 MG: 2.5 TABLET ORAL at 17:51

## 2025-04-18 RX ADMIN — SODIUM CHLORIDE 1000 ML: 0.9 INJECTION, SOLUTION INTRAVENOUS at 17:57

## 2025-04-18 RX ADMIN — FENTANYL CITRATE 50 MCG: 50 INJECTION, SOLUTION INTRAMUSCULAR; INTRAVENOUS at 13:14

## 2025-04-18 RX ADMIN — SODIUM CHLORIDE: 0.9 INJECTION, SOLUTION INTRAVENOUS at 18:03

## 2025-04-18 RX ADMIN — ENOXAPARIN SODIUM 40 MG: 100 INJECTION SUBCUTANEOUS at 17:51

## 2025-04-18 RX ADMIN — LIDOCAINE HYDROCHLORIDE 50 MG: 10 INJECTION, SOLUTION EPIDURAL; INFILTRATION; INTRACAUDAL; PERINEURAL at 12:53

## 2025-04-18 RX ADMIN — SUGAMMADEX 200 MG: 100 INJECTION, SOLUTION INTRAVENOUS at 14:52

## 2025-04-18 RX ADMIN — MAGNESIUM SULFATE HEPTAHYDRATE 2000 MG: 1 INJECTION, SOLUTION INTRAVENOUS at 13:35

## 2025-04-18 RX ADMIN — CLINDAMYCIN HYDROCHLORIDE 150 MG: 150 CAPSULE ORAL at 17:51

## 2025-04-18 RX ADMIN — ALBUMIN (HUMAN) 25 G: 12.5 INJECTION, SOLUTION INTRAVENOUS at 13:05

## 2025-04-18 RX ADMIN — HYDROMORPHONE HYDROCHLORIDE 0.5 MG: 1 INJECTION, SOLUTION INTRAMUSCULAR; INTRAVENOUS; SUBCUTANEOUS at 13:28

## 2025-04-18 RX ADMIN — Medication 200 MCG: at 14:16

## 2025-04-18 RX ADMIN — RANOLAZINE 500 MG: 500 TABLET, EXTENDED RELEASE ORAL at 21:55

## 2025-04-18 RX ADMIN — KETOROLAC TROMETHAMINE 30 MG: 30 INJECTION, SOLUTION INTRAMUSCULAR at 14:50

## 2025-04-18 RX ADMIN — INDOCYANINE GREEN AND WATER 5 MG: KIT at 13:14

## 2025-04-18 RX ADMIN — SODIUM CHLORIDE: 9 INJECTION, SOLUTION INTRAVENOUS at 11:00

## 2025-04-18 RX ADMIN — SODIUM CHLORIDE: 9 INJECTION, SOLUTION INTRAVENOUS at 14:16

## 2025-04-18 RX ADMIN — ONDANSETRON 4 MG: 2 INJECTION, SOLUTION INTRAMUSCULAR; INTRAVENOUS at 14:50

## 2025-04-18 RX ADMIN — DEXAMETHASONE SODIUM PHOSPHATE 8 MG: 4 INJECTION INTRA-ARTICULAR; INTRALESIONAL; INTRAMUSCULAR; INTRAVENOUS; SOFT TISSUE at 13:04

## 2025-04-18 RX ADMIN — TROSPIUM CHLORIDE 20 MG: 20 TABLET, FILM COATED ORAL at 17:51

## 2025-04-18 RX ADMIN — ROCURONIUM BROMIDE 25 MG: 50 INJECTION INTRAVENOUS at 14:00

## 2025-04-18 RX ADMIN — PROPOFOL 120 MG: 10 INJECTION, EMULSION INTRAVENOUS at 12:53

## 2025-04-18 RX ADMIN — ROCURONIUM BROMIDE 50 MG: 50 INJECTION INTRAVENOUS at 12:53

## 2025-04-18 RX ADMIN — FENTANYL CITRATE 50 MCG: 50 INJECTION, SOLUTION INTRAMUSCULAR; INTRAVENOUS at 12:53

## 2025-04-18 RX ADMIN — SENNOSIDES 8.6 MG: 8.6 TABLET, COATED ORAL at 21:56

## 2025-04-18 ASSESSMENT — PAIN - FUNCTIONAL ASSESSMENT
PAIN_FUNCTIONAL_ASSESSMENT: 0-10
PAIN_FUNCTIONAL_ASSESSMENT: NONE - DENIES PAIN

## 2025-04-18 NOTE — ANESTHESIA POSTPROCEDURE EVALUATION
Department of Anesthesiology  Postprocedure Note    Patient: Komal Crawford  MRN: [id number]  YOB: 1940  Date of evaluation: 4/18/2025    Procedure Summary       Date: 04/18/25 Room / Location: [address]    Anesthesia Start: 1249 Anesthesia Stop: 1528    Procedure: LAPAROSCOPIC ROBOTIC CONVERSION TO OPEN TRANSVERSE COLOSTOMY CREATION WITH LOOP AND LYSIS OF ADHESIONS Diagnosis:       Chronic constipation      Incontinence of feces, unspecified fecal incontinence type      (Chronic constipation [K59.09])      (Incontinence of feces, unspecified fecal incontinence type [R15.9])    Surgeons: Sherrie Lawler MD Responsible Provider: Aracelis Doe MD    Anesthesia Type: general ASA Status: 3            Anesthesia Type: No value filed.    Lelo Phase I: Lelo Score: 9    Lelo Phase II:      Anesthesia Post Evaluation    Patient location during evaluation: PACU  Patient participation: complete - patient participated  Level of consciousness: awake and alert  Airway patency: patent  Nausea & Vomiting: no nausea and no vomiting  Cardiovascular status: blood pressure returned to baseline  Respiratory status: acceptable and room air  Hydration status: euvolemic  Pain management: adequate and satisfactory to patient    No notable events documented.

## 2025-04-18 NOTE — ANESTHESIA PRE PROCEDURE
(+) hiatal hernia, GERD:         ROS comment: Diabetic gastroparesis .   Endo/Other:    (+) DiabetesType II DM, no interval change, : arthritis: OA..                  ROS comment:     Chronic constipation [K59.09]       Incontinence of feces   Chronic ITP (idiopathic thrombocytopenia)   Osteoporosis  Macular degeneration of left eye Abdominal: normal exam      Abdomen: soft.      Vascular: negative vascular ROS.         Other Findings:             Anesthesia Plan      general     ASA 3     (Cardiac cleared by cardiologist for surgery)  Induction: intravenous.    MIPS: Postoperative opioids intended and Prophylactic antiemetics administered.  Anesthetic plan and risks discussed with patient.      Plan discussed with CRNA.                    Aracelis Doe MD   4/18/2025

## 2025-04-18 NOTE — H&P
Chief complaint chronic constipation and chronic incontinence    Patient is an 84-year-old female with a previous history of sigmoid resection colostomy and takedown for prior diverticular disease.  She states over the last several years ever since her bowels are poor particular she is having difficulties with both chronic constipation but also particularly incontinence.  She has very poor control of her bowels.  She is having difficulties with care hygiene and wounds on her bottom secondary to this.  For this reason the patient went actually think she be better served with her colostomy back again.    Past medical history  Chronic fatigue  Essential tremor  Neuropathy  Depression  Coronary artery disease  TIA  Macular degeneration left eye  Thrombocytopenia  B12 and vitamin D deficiency  Chronic anemia  Kidney cyst  Hypertension  Incontinence  Incontinence  Recurrent UTIs  Hiatal hernia  Diabetic gastroparesis  Colon polyps  Back pain  ITP  Anxiety  Arthritis  Type 2 diabetes  Bladder spasms  Joint pain particular knees and hips  Previous history of left femoral neck fracture  Rectal prolapse  History of knee replacement infection  History UTIs  History neurogenic bladder  Atrial fibrillation  Parkinson's  History of left buttock decubitus  History suprapubic catheter.    Past surgical history  Appendectomy  Back surgery  Bladder stimulator  Bladder suspension multiple times  Cardiac cath 2008 with 2 stents in 2022 with 2 stents  Left and right cataract removals  Multiple colonoscopies  Pradhan's procedure with colostomy and then takedown  Multiple cystoscopies including with Botox injection  Right hip fracture surgery  Left wrist fracture surgery  Left ankle fracture surgery  Hernia repair  Hysterectomy  Bilateral knee replacements  Left quadriceps muscle repair  Colostomy revision  Spine fusion  Tonsillectomy  EGDs    Allergies    Cephalosporins, fluorescein  IVP

## 2025-04-18 NOTE — OP NOTE
Operative Note      Patient: Komal Crawford  YOB: 1940  MRN: [id number]    Date of Procedure: 4/18/2025    Pre-Op Diagnosis Codes:      * Chronic constipation [K59.09]     * Incontinence of feces, unspecified fecal incontinence type [R15.9]    Post-Op Diagnosis: Same       Procedure(s):  LAPAROSCOPIC ROBOTIC COLOSTOMY CREATION converted to open    Surgeon(s):  Sherrie Lawler MD    Assistant:   First Assistant: Jody Hickey    Anesthesia: General And local 0.5% Marcaine open tap block    Estimated Blood Loss (mL): less than 100     Complications: None    Specimens:   * No specimens in log *    Implants:  * No implants in log *      Drains: * No LDAs found *    Findings:  Infection Present At Time Of Surgery (PATOS) (choose all levels that have infection present):  No infection present  Other Findings: Considerable adhesions of small bowel to previously placed intra-abdominal mesh and existing suprapubic catheter precluding good visualization in the pelvis.  Procedure converted from robotic laparoscopic to open secondary to extensive adhesions  This procedure was not performed to treat colon cancer through resection    Detailed Description of Procedure:   The patient is an 84-year-old female who presented to the office.  She has had a previous history of Pradhan's procedure and a colostomy takedown couple decades ago for diverticular disease.  The meantime she has developed both urinary and fecal incontinence and some bedsores from this.  She desires to have colostomy again for control of her bowels and has a suprapubic catheter in place.  Discussed with patient open versus robotic laparoscopic repair.  Informed consent was obtained for the patient prior to surgery.  She done bowel prep at home the day previously.    The patient was brought to the operating room laid on table spine position.  General orotracheal anesthesia was induced.  Timeout is performed.  The suprapubic bladder catheter is washed and

## 2025-04-18 NOTE — RT PROTOCOL NOTE
RT Inhaler-Nebulizer Bronchodilator Protocol Note    There is a bronchodilator order in the chart from a provider indicating to follow the RT Bronchodilator Protocol and there is an “Initiate RT Inhaler-Nebulizer Bronchodilator Protocol” order as well (see protocol at bottom of note).    CXR Findings:  No results found.    The findings from the last RT Protocol Assessment were as follows:   History Pulmonary Disease: None or smoker <15 pack years  Respiratory Pattern:    Breath Sounds: Slightly diminished and/or crackles  Cough: Strong, spontaneous, non-productive  Indication for Bronchodilator Therapy:    Bronchodilator Assessment Score:      Aerosolized bronchodilator medication orders have been revised according to the RT Inhaler-Nebulizer Bronchodilator Protocol below.    Respiratory Therapist to perform RT Therapy Protocol Assessment initially then follow the protocol.  Repeat RT Therapy Protocol Assessment PRN for score 0-3 or on second treatment, BID, and PRN for scores above 3.    No Indications - adjust the frequency to every 6 hours PRN wheezing or bronchospasm, if no treatments needed after 48 hours then discontinue using Per Protocol order mode.     If indication present, adjust the RT bronchodilator orders based on the Bronchodilator Assessment Score as indicated below.  Use Inhaler orders unless patient has one or more of the following: on home nebulizer, not able to hold breath for 10 seconds, is not alert and oriented, cannot activate and use MDI correctly, or respiratory rate 25 breaths per minute or more, then use the equivalent nebulizer order(s) with same Frequency and PRN reasons based on the score.  If a patient is on this medication at home then do not decrease Frequency below that used at home.    0-3 - enter or revise RT bronchodilator order(s) to equivalent RT Bronchodilator order with Frequency of every 4 hours PRN for wheezing or increased work of breathing using Per Protocol order mode.

## 2025-04-18 NOTE — CONSULTS
supple, no carotid bruits, thyroid not palpable  Lungs: Bilateral equal air entry, clear to ausculation, no wheezing, rales or rhonchi, normal effort  Cardiovascular: normal rate, regular rhythm, no murmur, gallop, rub.  Abdomen: Large midline incision of open laparotomy colostomy present left side of abdomen.  Neurologic: There are no new focal motor or sensory deficits, normal muscle tone and bulk, no abnormal sensation, normal speech, cranial nerves II through XII grossly intact  Skin: No gross lesions, rashes, bruising or bleeding on exposed skin area  Extremities:  peripheral pulses palpable, no pedal edema or calf pain with palpation  Psych: normal affect    Investigations:      Laboratory Testing:  No results found for this or any previous visit (from the past 24 hours).    Imaging/Diagonstics:  Recent data reviewed    Assessment :      Primary Problem  <principal problem not specified>    Active Hospital Problems    Diagnosis Date Noted    Recurrent major depressive disorder, in remission [F33.40] 09/20/2022     Priority: Medium    Incontinence in female [R32] 04/18/2025    Suprapubic catheter ([facility]) [Z93.59] 04/10/2025    Longstanding persistent atrial fibrillation (HCC) [I48.11] 06/12/2024    Parkinson's disease [G20.A1] 06/12/2024    Presence of drug-eluting stent in right coronary artery [Z95.5] 10/12/2022    Coronary angioplasty status [Z98.61] 08/25/2022    Type 2 diabetes mellitus with diabetic peripheral angiopathy without gangrene, without long-term current use of insulin (HCC) [E11.51] 11/23/2020    Chronic ITP (idiopathic thrombocytopenia) ([facility]) [D69.3] 04/12/2017    Diabetic gastroparesis (HCC) [E11.43, K31.84]     Pure hypercholesterolemia [E78.00] 03/01/2016    Essential hypertension [I10] 10/14/2015    Gastroesophageal reflux disease without esophagitis [K21.9] 10/14/2015    Thrombocytopenia [D69.6] 07/15/2014    Macular degeneration of left eye [H35.30]     Diabetic peripheral neuropathy ([facility])

## 2025-04-18 NOTE — PLAN OF CARE
Problem: Safety - Adult  Goal: Free from fall injury  Outcome: Progressing     Problem: Pain  Goal: Verbalizes/displays adequate comfort level or baseline comfort level  Outcome: Progressing  Flowsheets (Taken 4/18/2025 1809)  Verbalizes/displays adequate comfort level or baseline comfort level:   Encourage patient to monitor pain and request assistance   Assess pain using appropriate pain scale   Administer analgesics based on type and severity of pain and evaluate response   Implement non-pharmacological measures as appropriate and evaluate response   Consider cultural and social influences on pain and pain management   Notify Licensed Independent Practitioner if interventions unsuccessful or patient reports new pain     Problem: Chronic Conditions and Co-morbidities  Goal: Patient's chronic conditions and co-morbidity symptoms are monitored and maintained or improved  Outcome: Progressing  Flowsheets (Taken 4/18/2025 1800)  Care Plan - Patient's Chronic Conditions and Co-Morbidity Symptoms are Monitored and Maintained or Improved: Monitor and assess patient's chronic conditions and comorbid symptoms for stability, deterioration, or improvement     Problem: Skin/Tissue Integrity  Goal: Skin integrity remains intact  Description: 1.  Monitor for areas of redness and/or skin breakdown2.  Assess vascular access sites hourly3.  Every 4-6 hours minimum:  Change oxygen saturation probe site4.  Every 4-6 hours:  If on nasal continuous positive airway pressure, respiratory therapy assess nares and determine need for appliance change or resting period  Outcome: Progressing  Flowsheets (Taken 4/18/2025 1800)  Skin Integrity Remains Intact:   Monitor for areas of redness and/or skin breakdown   Assess vascular access sites hourly

## 2025-04-19 ENCOUNTER — APPOINTMENT (OUTPATIENT)
Dept: GENERAL RADIOLOGY | Age: 85
End: 2025-04-19
Attending: SURGERY
Payer: MEDICARE

## 2025-04-19 LAB
ANION GAP SERPL CALCULATED.3IONS-SCNC: 9 MMOL/L (ref 9–17)
BASOPHILS # BLD: 0 K/UL (ref 0–0.2)
BASOPHILS NFR BLD: 0 % (ref 0–2)
BUN SERPL-MCNC: 15 MG/DL (ref 8–23)
CALCIUM SERPL-MCNC: 7 MG/DL (ref 8.6–10.4)
CHLORIDE SERPL-SCNC: 114 MMOL/L (ref 98–107)
CO2 SERPL-SCNC: 17 MMOL/L (ref 20–31)
CREAT SERPL-MCNC: 0.8 MG/DL (ref 0.5–0.9)
EOSINOPHIL # BLD: 0 K/UL (ref 0–0.4)
EOSINOPHILS RELATIVE PERCENT: 0 % (ref 1–4)
ERYTHROCYTE [DISTWIDTH] IN BLOOD BY AUTOMATED COUNT: 17.1 % (ref 12.5–15.4)
GFR, ESTIMATED: 73 ML/MIN/1.73M2
GLUCOSE BLD-MCNC: 126 MG/DL (ref 65–105)
GLUCOSE BLD-MCNC: 126 MG/DL (ref 65–105)
GLUCOSE BLD-MCNC: 171 MG/DL (ref 65–105)
GLUCOSE BLD-MCNC: 177 MG/DL (ref 65–105)
GLUCOSE SERPL-MCNC: 195 MG/DL (ref 70–99)
HCT VFR BLD AUTO: 18.8 % (ref 36–46)
HCT VFR BLD AUTO: 27.6 % (ref 36–46)
HCT VFR BLD AUTO: 31.9 % (ref 36–46)
HGB BLD-MCNC: 10.5 G/DL (ref 12–16)
HGB BLD-MCNC: 5.9 G/DL (ref 12–16)
HGB BLD-MCNC: 9 G/DL (ref 12–16)
LYMPHOCYTES NFR BLD: 0.5 K/UL (ref 1–4.8)
LYMPHOCYTES RELATIVE PERCENT: 6 % (ref 24–44)
MCH RBC QN AUTO: 28 PG (ref 26–34)
MCHC RBC AUTO-ENTMCNC: 31.3 G/DL (ref 31–37)
MCV RBC AUTO: 89.3 FL (ref 80–100)
MONOCYTES NFR BLD: 0.6 K/UL (ref 0.1–1.2)
MONOCYTES NFR BLD: 6 % (ref 2–11)
NEUTROPHILS NFR BLD: 88 % (ref 36–66)
NEUTS SEG NFR BLD: 7.9 K/UL (ref 1.8–7.7)
PLATELET # BLD AUTO: 99 K/UL (ref 140–450)
PMV BLD AUTO: 12.1 FL (ref 6–12)
POTASSIUM SERPL-SCNC: 4.7 MMOL/L (ref 3.7–5.3)
RBC # BLD AUTO: 2.1 M/UL (ref 4–5.2)
SODIUM SERPL-SCNC: 140 MMOL/L (ref 135–144)
WBC OTHER # BLD: 8.9 K/UL (ref 3.5–11)

## 2025-04-19 PROCEDURE — 99232 SBSQ HOSP IP/OBS MODERATE 35: CPT | Performed by: INTERNAL MEDICINE

## 2025-04-19 PROCEDURE — 36430 TRANSFUSION BLD/BLD COMPNT: CPT

## 2025-04-19 PROCEDURE — P9016 RBC LEUKOCYTES REDUCED: HCPCS

## 2025-04-19 PROCEDURE — 6370000000 HC RX 637 (ALT 250 FOR IP): Performed by: NURSE PRACTITIONER

## 2025-04-19 PROCEDURE — 36415 COLL VENOUS BLD VENIPUNCTURE: CPT

## 2025-04-19 PROCEDURE — 82947 ASSAY GLUCOSE BLOOD QUANT: CPT

## 2025-04-19 PROCEDURE — 6370000000 HC RX 637 (ALT 250 FOR IP): Performed by: SURGERY

## 2025-04-19 PROCEDURE — 94761 N-INVAS EAR/PLS OXIMETRY MLT: CPT

## 2025-04-19 PROCEDURE — 6370000000 HC RX 637 (ALT 250 FOR IP): Performed by: INTERNAL MEDICINE

## 2025-04-19 PROCEDURE — 85025 COMPLETE CBC W/AUTO DIFF WBC: CPT

## 2025-04-19 PROCEDURE — 1200000000 HC SEMI PRIVATE

## 2025-04-19 PROCEDURE — P9040 RBC LEUKOREDUCED IRRADIATED: HCPCS

## 2025-04-19 PROCEDURE — 85014 HEMATOCRIT: CPT

## 2025-04-19 PROCEDURE — 2580000003 HC RX 258: Performed by: SURGERY

## 2025-04-19 PROCEDURE — 2700000000 HC OXYGEN THERAPY PER DAY

## 2025-04-19 PROCEDURE — 80048 BASIC METABOLIC PNL TOTAL CA: CPT

## 2025-04-19 PROCEDURE — 71045 X-RAY EXAM CHEST 1 VIEW: CPT

## 2025-04-19 PROCEDURE — 85018 HEMOGLOBIN: CPT

## 2025-04-19 RX ORDER — GUAIFENESIN 600 MG/1
1200 TABLET, EXTENDED RELEASE ORAL 2 TIMES DAILY
Status: DISCONTINUED | OUTPATIENT
Start: 2025-04-19 | End: 2025-04-25 | Stop reason: HOSPADM

## 2025-04-19 RX ORDER — HYDROCODONE BITARTRATE AND ACETAMINOPHEN 5; 325 MG/1; MG/1
1 TABLET ORAL EVERY 4 HOURS PRN
Status: DISCONTINUED | OUTPATIENT
Start: 2025-04-19 | End: 2025-04-25 | Stop reason: HOSPADM

## 2025-04-19 RX ORDER — SODIUM CHLORIDE 9 MG/ML
INJECTION, SOLUTION INTRAVENOUS PRN
Status: DISCONTINUED | OUTPATIENT
Start: 2025-04-19 | End: 2025-04-25 | Stop reason: HOSPADM

## 2025-04-19 RX ORDER — MIDODRINE HYDROCHLORIDE 5 MG/1
5 TABLET ORAL ONCE
Status: COMPLETED | OUTPATIENT
Start: 2025-04-20 | End: 2025-04-20

## 2025-04-19 RX ORDER — MIDODRINE HYDROCHLORIDE 2.5 MG/1
2.5 TABLET ORAL ONCE
Status: COMPLETED | OUTPATIENT
Start: 2025-04-19 | End: 2025-04-19

## 2025-04-19 RX ORDER — 0.9 % SODIUM CHLORIDE 0.9 %
500 INTRAVENOUS SOLUTION INTRAVENOUS ONCE
Status: COMPLETED | OUTPATIENT
Start: 2025-04-20 | End: 2025-04-20

## 2025-04-19 RX ADMIN — SENNOSIDES 8.6 MG: 8.6 TABLET, COATED ORAL at 20:52

## 2025-04-19 RX ADMIN — MIDODRINE HYDROCHLORIDE 2.5 MG: 2.5 TABLET ORAL at 23:20

## 2025-04-19 RX ADMIN — DOXYCYCLINE HYCLATE 100 MG: 100 TABLET, COATED ORAL at 20:52

## 2025-04-19 RX ADMIN — GABAPENTIN 100 MG: 100 CAPSULE ORAL at 08:27

## 2025-04-19 RX ADMIN — MIDODRINE HYDROCHLORIDE 2.5 MG: 2.5 TABLET ORAL at 00:06

## 2025-04-19 RX ADMIN — TIZANIDINE 2 MG: 4 TABLET ORAL at 20:52

## 2025-04-19 RX ADMIN — DOXYCYCLINE HYCLATE 100 MG: 100 TABLET, COATED ORAL at 08:26

## 2025-04-19 RX ADMIN — SODIUM CHLORIDE: 0.9 INJECTION, SOLUTION INTRAVENOUS at 13:55

## 2025-04-19 RX ADMIN — TROSPIUM CHLORIDE 20 MG: 20 TABLET, FILM COATED ORAL at 06:26

## 2025-04-19 RX ADMIN — GUAIFENESIN 1200 MG: 600 TABLET, EXTENDED RELEASE ORAL at 20:52

## 2025-04-19 RX ADMIN — PANTOPRAZOLE SODIUM 40 MG: 40 TABLET, DELAYED RELEASE ORAL at 06:27

## 2025-04-19 RX ADMIN — TROSPIUM CHLORIDE 20 MG: 20 TABLET, FILM COATED ORAL at 16:41

## 2025-04-19 RX ADMIN — GUAIFENESIN 1200 MG: 600 TABLET, EXTENDED RELEASE ORAL at 11:16

## 2025-04-19 RX ADMIN — SENNOSIDES 8.6 MG: 8.6 TABLET, COATED ORAL at 08:27

## 2025-04-19 RX ADMIN — GABAPENTIN 100 MG: 100 CAPSULE ORAL at 20:52

## 2025-04-19 RX ADMIN — RANOLAZINE 500 MG: 500 TABLET, EXTENDED RELEASE ORAL at 08:26

## 2025-04-19 RX ADMIN — RANOLAZINE 500 MG: 500 TABLET, EXTENDED RELEASE ORAL at 20:52

## 2025-04-19 NOTE — PLAN OF CARE
Problem: Safety - Adult  Goal: Free from fall injury  4/19/2025 1729 by Spike Hollins RN  Outcome: Progressing     Problem: Pain  Goal: Verbalizes/displays adequate comfort level or baseline comfort level  4/19/2025 1729 by Spike Hollins RN  Outcome: Progressing  Flowsheets  Taken 4/19/2025 1729  Verbalizes/displays adequate comfort level or baseline comfort level:   Encourage patient to monitor pain and request assistance   Assess pain using appropriate pain scale   Administer analgesics based on type and severity of pain and evaluate response   Implement non-pharmacological measures as appropriate and evaluate response   Consider cultural and social influences on pain and pain management   Notify Licensed Independent Practitioner if interventions unsuccessful or patient reports new pain  Taken 4/19/2025 1600  Verbalizes/displays adequate comfort level or baseline comfort level:   Assess pain using appropriate pain scale   Encourage patient to monitor pain and request assistance   Administer analgesics based on type and severity of pain and evaluate response   Implement non-pharmacological measures as appropriate and evaluate response   Consider cultural and social influences on pain and pain management   Notify Licensed Independent Practitioner if interventions unsuccessful or patient reports new pain  Taken 4/19/2025 1300  Verbalizes/displays adequate comfort level or baseline comfort level:   Encourage patient to monitor pain and request assistance   Administer analgesics based on type and severity of pain and evaluate response   Assess pain using appropriate pain scale   Implement non-pharmacological measures as appropriate and evaluate response   Consider cultural and social influences on pain and pain management   Notify Licensed Independent Practitioner if interventions unsuccessful or patient reports new pain  Taken 4/19/2025 1123  Verbalizes/displays adequate comfort level or baseline comfort level:

## 2025-04-19 NOTE — CARE COORDINATION
Case Management Assessment  Initial Evaluation    Date/Time of Evaluation: 4/19/2025 12:47 PM  Assessment Completed by: Shavon Simpson    If patient is discharged prior to next notation, then this note serves as note for discharge by case management.    Patient Name: Komal Crawford                   YOB: 1940  Diagnosis: Chronic constipation [K59.09]  Incontinence of feces, unspecified fecal incontinence type [R15.9]  Incontinence in female [R32]                   Date / Time: 4/18/2025 10:07 AM    Patient Admission Status: Inpatient   Readmission Risk (Low < 19, Mod (19-27), High > 27): Readmission Risk Score: 19.3    Current PCP: Sabiha Bedolla MD  PCP verified by CM? (P) Yes    Chart Reviewed: Yes      History Provided by: (P) Patient  Patient Orientation: (P) Alert and Oriented    Patient Cognition: (P) Alert    Hospitalization in the last 30 days (Readmission):  No    If yes, Readmission Assessment in  Navigator will be completed.    Advance Directives:      Code Status: Full Code   Patient's Primary Decision Maker is: (P) Legal Next of Kin ( Don)    Primary Decision Maker: Ritchie Crawford - Spouse - [phone]    Secondary Decision Maker: NiloRamila - Friend - [phone]    Discharge Planning:    Patient lives with: (P) Spouse/Significant Other Type of Home: (P) Trailer/Mobile Home  Primary Care Giver: (P) Self  Patient Support Systems include: (P) Spouse/Significant Other   Current Financial resources: (P) Medicare  Current community resources: (P) ECF/Home Care  Current services prior to admission: (P) C-pap, Home Care, Meals On Wheels            Current DME:              Type of Home Care services:  (P) PT, OT, Nursing Services, Meals on Wheels    ADLS  Prior functional level: (P) Independent in ADLs/IADLs  Current functional level: (P) Independent in ADLs/IADLs    PT AM-PAC:   /24  OT AM-PAC:   /24    Family can provide assistance at DC: (P) Yes  Would you like Case Management

## 2025-04-19 NOTE — CONSENT
Informed Consent for Blood Component Transfusion Note    I have discussed with the patient the rationale for blood component transfusion; its benefits in treating or preventing fatigue, organ damage, or death; and its risk which includes mild transfusion reactions, rare risk of blood borne infection, or more serious but rare reactions. I have discussed the alternatives to transfusion, including the risk and consequences of not receiving transfusion. The patient had an opportunity to ask questions and had agreed to proceed with transfusion of blood components.    Electronically signed by Louis Vaz MD on 4/19/25 at 6:58 AM EDT

## 2025-04-19 NOTE — PLAN OF CARE
Problem: Safety - Adult  Goal: Free from fall injury  4/19/2025 0403 by Ryan Cm LPN  Outcome: Progressing    Problem: Pain  Goal: Verbalizes/displays adequate comfort level or baseline comfort level  4/19/2025 0403 by Ryan Cm LPN  Outcome: Progressing     Problem: Chronic Conditions and Co-morbidities  Goal: Patient's chronic conditions and co-morbidity symptoms are monitored and maintained or improved  4/19/2025 0403 by Ryan Cm LPN  Outcome: Progressing     Problem: Discharge Planning  Goal: Discharge to home or other facility with appropriate resources  4/19/2025 0403 by Ryan Cm LPN  Outcome: Progressing     Problem: Skin/Tissue Integrity  Goal: Skin integrity remains intact  Description: 1.  Monitor for areas of redness and/or skin breakdown2.  Assess vascular access sites hourly3.  Every 4-6 hours minimum:  Change oxygen saturation probe site4.  Every 4-6 hours:  If on nasal continuous positive airway pressure, respiratory therapy assess nares and determine need for appliance change or resting period  4/19/2025 0403 by Ryan Cm LPN  Outcome: Progressing

## 2025-04-19 NOTE — PROGRESS NOTES
Patient sitting up in chair at bedside having a clear liquid diet  States feels pretty good except for some incisional discomfort  No nausea or vomiting    Note has slightly low blood pressures although not tachycardic overnight.  Had 2 units packed red cells after hemoglobin of 5.1 but now up in the 10 range.  Denies any lightheadedness or dizziness.    Midline incision clean and dry  Stoma pink and viable but not producing any flatus or succus yet    Electrolytes fairly unremarkable and BUN and creatinine normal.  Sugar mildly elevated.    Not sure if low hemoglobin this after multiple fluid boluses or lab error or actual.  Patient however looking quite well vitals currently stable asymptomatic and hemoglobin back up in 10 range.  Will have H&H checked again tomorrow morning.  Lovenox currently on hold but if hemoglobin stable by tomorrow can likely resume    Hold diet clear liquids until better resumption of GI function.  This may take a few days this patient had extensive lysis of adhesions at the time of surgery.

## 2025-04-19 NOTE — PROGRESS NOTES
I   [facility]  Office: [phone]  Gordo Jimenez DO, Dejuan Hammer DO, Silver Jimenez DO, Eleazar Knox DO, July Patricia MD, Milena Forbes MD, Alis Head MD, Greer Womack MD,  Nakul Marie MD, Sravan Anthony MD, Louis Vaz MD,  Ceferino Jenkins DO, Mindi Claudio MD, Gopal Perry MD, Brandan Jimenez DO, Samantha Pinon MD,  Beltran Glynn DO, Rosa Alicea MD, Gabriela Rosales MD, Benson Raman MD,  Dami Trent MD, Roger Oconnell MD, Jos Lopez MD, Jodie Abraham MD, Cristino Melendrez MD, Marcos Weston MD, Torsten Duong DO, Heydi Maya MD, Ayaan Del Angel MD, Ceferino Castillo MD, Mohsin Reza, MD, Alvin Barbour MD, Shirley Waterhouse, CNP,  Dominga Devlin, CNP, Torsten Mcclendon, CNP,  Ivory Sellers, DNP, Monica Ahumada, CNP, Areli Grant, CNP, Lisha Guzman, CNP, Ladi Fraser, CNP, Divya Marino, PA-C, Chrissy Ramirez, CNP, Fermín Delgado, CNP,  Cindy Kennedy, CNP, Yessica Fung, CNP, Warner Puente, PA-C, Caitlyn Nettles, CNP,  Katheryn Krueger, CNS, Caroline Briseno, CNP, Brittany Arthur, CNP,   Yadira Padron, CNP              [facility]   IN-PATIENT SERVICE   [facility]           CONSULTATION NOTE            Date:   4/19/2025  Patient name:  Komal Crawford  Date of admission:  4/18/2025 10:07 AM  MRN:   [id number]  Account:  [id number]  YOB: 1940  PCP:    Sabiha Bedolla MD  Room:   [address] [place] [address]  Code Status:    Full Code    Physician Requesting Consult: Sherrie Lawler MD    Reason for Consult:  Medical management    Chief Complaint:     No chief complaint on file.  Chronic constipation and chronic incontinence.    History Obtained From:     Patient, EMR, nursing staff    History of Present Illness:     84-year-old female with type 2 diabetes, Parkinson's disease, ITP, persistent A-fib, chronic infection of left knee prosthesis follows with ID.  Has history of sigmoid resection colostomy and takedown for prior diverticular disease.

## 2025-04-20 LAB
ABO/RH: NORMAL
ANION GAP SERPL CALCULATED.3IONS-SCNC: 9 MMOL/L (ref 9–17)
ANTIBODY SCREEN: NEGATIVE
ARM BAND NUMBER: NORMAL
BLOOD BANK BLOOD PRODUCT EXPIRATION DATE: NORMAL
BLOOD BANK BLOOD PRODUCT EXPIRATION DATE: NORMAL
BLOOD BANK DISPENSE STATUS: NORMAL
BLOOD BANK DISPENSE STATUS: NORMAL
BLOOD BANK ISBT PRODUCT BLOOD TYPE: 5100
BLOOD BANK ISBT PRODUCT BLOOD TYPE: 9500
BLOOD BANK PRODUCT CODE: NORMAL
BLOOD BANK PRODUCT CODE: NORMAL
BLOOD BANK SAMPLE EXPIRATION: NORMAL
BLOOD BANK UNIT TYPE AND RH: NORMAL
BLOOD BANK UNIT TYPE AND RH: NORMAL
BPU ID: NORMAL
BPU ID: NORMAL
BUN SERPL-MCNC: 18 MG/DL (ref 8–23)
CALCIUM SERPL-MCNC: 8.1 MG/DL (ref 8.6–10.4)
CHLORIDE SERPL-SCNC: 110 MMOL/L (ref 98–107)
CO2 SERPL-SCNC: 20 MMOL/L (ref 20–31)
COMPONENT: NORMAL
COMPONENT: NORMAL
CREAT SERPL-MCNC: 0.9 MG/DL (ref 0.5–0.9)
CROSSMATCH RESULT: NORMAL
CROSSMATCH RESULT: NORMAL
ERYTHROCYTE [DISTWIDTH] IN BLOOD BY AUTOMATED COUNT: 16.2 % (ref 12.5–15.4)
GFR, ESTIMATED: 63 ML/MIN/1.73M2
GLUCOSE BLD-MCNC: 111 MG/DL (ref 65–105)
GLUCOSE BLD-MCNC: 65 MG/DL (ref 65–105)
GLUCOSE BLD-MCNC: 83 MG/DL (ref 65–105)
GLUCOSE BLD-MCNC: 92 MG/DL (ref 65–105)
GLUCOSE SERPL-MCNC: 141 MG/DL (ref 70–99)
HCT VFR BLD AUTO: 29.2 % (ref 36–46)
HGB BLD-MCNC: 9.5 G/DL (ref 12–16)
MCH RBC QN AUTO: 29 PG (ref 26–34)
MCHC RBC AUTO-ENTMCNC: 32.6 G/DL (ref 31–37)
MCV RBC AUTO: 88.8 FL (ref 80–100)
PLATELET # BLD AUTO: 104 K/UL (ref 140–450)
PMV BLD AUTO: 11.9 FL (ref 6–12)
POTASSIUM SERPL-SCNC: 4.5 MMOL/L (ref 3.7–5.3)
RBC # BLD AUTO: 3.29 M/UL (ref 4–5.2)
SODIUM SERPL-SCNC: 139 MMOL/L (ref 135–144)
TRANSFUSION STATUS: NORMAL
TRANSFUSION STATUS: NORMAL
UNIT DIVISION: 0
UNIT DIVISION: 0
UNIT ISSUE DATE/TIME: NORMAL
UNIT ISSUE DATE/TIME: NORMAL
WBC OTHER # BLD: 11.3 K/UL (ref 3.5–11)

## 2025-04-20 PROCEDURE — 36415 COLL VENOUS BLD VENIPUNCTURE: CPT

## 2025-04-20 PROCEDURE — 2500000003 HC RX 250 WO HCPCS: Performed by: SURGERY

## 2025-04-20 PROCEDURE — 6370000000 HC RX 637 (ALT 250 FOR IP): Performed by: SURGERY

## 2025-04-20 PROCEDURE — 99233 SBSQ HOSP IP/OBS HIGH 50: CPT | Performed by: INTERNAL MEDICINE

## 2025-04-20 PROCEDURE — 6360000002 HC RX W HCPCS: Performed by: SURGERY

## 2025-04-20 PROCEDURE — 6370000000 HC RX 637 (ALT 250 FOR IP): Performed by: NURSE PRACTITIONER

## 2025-04-20 PROCEDURE — 6370000000 HC RX 637 (ALT 250 FOR IP): Performed by: INTERNAL MEDICINE

## 2025-04-20 PROCEDURE — 1200000000 HC SEMI PRIVATE

## 2025-04-20 PROCEDURE — 85027 COMPLETE CBC AUTOMATED: CPT

## 2025-04-20 PROCEDURE — 2580000003 HC RX 258: Performed by: NURSE PRACTITIONER

## 2025-04-20 PROCEDURE — 80048 BASIC METABOLIC PNL TOTAL CA: CPT

## 2025-04-20 PROCEDURE — 82947 ASSAY GLUCOSE BLOOD QUANT: CPT

## 2025-04-20 RX ADMIN — TROSPIUM CHLORIDE 20 MG: 20 TABLET, FILM COATED ORAL at 05:46

## 2025-04-20 RX ADMIN — DOXYCYCLINE HYCLATE 100 MG: 100 TABLET, COATED ORAL at 22:05

## 2025-04-20 RX ADMIN — GABAPENTIN 100 MG: 100 CAPSULE ORAL at 22:05

## 2025-04-20 RX ADMIN — MIDODRINE HYDROCHLORIDE 5 MG: 5 TABLET ORAL at 02:02

## 2025-04-20 RX ADMIN — ENOXAPARIN SODIUM 40 MG: 100 INJECTION SUBCUTANEOUS at 09:04

## 2025-04-20 RX ADMIN — MIDODRINE HYDROCHLORIDE 2.5 MG: 2.5 TABLET ORAL at 16:53

## 2025-04-20 RX ADMIN — SENNOSIDES 8.6 MG: 8.6 TABLET, COATED ORAL at 09:03

## 2025-04-20 RX ADMIN — RANOLAZINE 500 MG: 500 TABLET, EXTENDED RELEASE ORAL at 22:05

## 2025-04-20 RX ADMIN — SENNOSIDES 8.6 MG: 8.6 TABLET, COATED ORAL at 22:05

## 2025-04-20 RX ADMIN — GABAPENTIN 100 MG: 100 CAPSULE ORAL at 09:03

## 2025-04-20 RX ADMIN — GUAIFENESIN 1200 MG: 600 TABLET, EXTENDED RELEASE ORAL at 22:05

## 2025-04-20 RX ADMIN — GUAIFENESIN 1200 MG: 600 TABLET, EXTENDED RELEASE ORAL at 09:03

## 2025-04-20 RX ADMIN — SODIUM CHLORIDE, PRESERVATIVE FREE 10 ML: 5 INJECTION INTRAVENOUS at 22:08

## 2025-04-20 RX ADMIN — RANOLAZINE 500 MG: 500 TABLET, EXTENDED RELEASE ORAL at 09:03

## 2025-04-20 RX ADMIN — SODIUM CHLORIDE 500 ML: 0.9 INJECTION, SOLUTION INTRAVENOUS at 00:38

## 2025-04-20 RX ADMIN — DOXYCYCLINE HYCLATE 100 MG: 100 TABLET, COATED ORAL at 09:03

## 2025-04-20 RX ADMIN — TROSPIUM CHLORIDE 20 MG: 20 TABLET, FILM COATED ORAL at 16:54

## 2025-04-20 RX ADMIN — TIZANIDINE 2 MG: 4 TABLET ORAL at 23:08

## 2025-04-20 RX ADMIN — SODIUM CHLORIDE, PRESERVATIVE FREE 10 ML: 5 INJECTION INTRAVENOUS at 09:03

## 2025-04-20 RX ADMIN — PANTOPRAZOLE SODIUM 40 MG: 40 TABLET, DELAYED RELEASE ORAL at 05:46

## 2025-04-20 NOTE — PROGRESS NOTES
Spiritual Health History and Assessment/Progress Note  [facility]    (P) Spiritual/Emotional Needs, Initial Encounter, (P) Emotional distress, (P) Grief and loss, Adjustment to illness, Life Adjustments,      Name: Komal Crawford MRN: [id number]    Age: 84 y.o.     Sex: female   Language: English   Moravian: Restorationist   Incontinence in female     Date: 4/19/2025            Total Time Calculated: (P) 45 min              Spiritual Assessment began in [facility] [place]        Referral/Consult From: (P) Rounding   Encounter Overview/Reason: (P) Spiritual/Emotional Needs, Initial Encounter  Service Provided For: (P) Patient       visited Pt. In room during rounding. Pt. Was very welcoming and open to conversation about family, Oriental orthodox, and tragedies and hard times in the light of God's plan and provision.  provided empathic listening and comfort and encouragement. Pt. Was very expressive and really appreciated  visit. Prayer was provided for comfort, strength and peace of mind. Good visit. Will follow up.    Kinsey, Belief, Meaning:   Patient has beliefs or practices that help with coping during difficult times  Family/Friends No family/friends present      Importance and Influence:  Patient has spiritual/personal beliefs that influence decisions regarding their health  Family/Friends No family/friends present    Community:  Patient feels well-supported. Support system includes: Spouse/Partner  Family/Friends No family/friends present    Assessment and Plan of Care:     Patient Interventions include: Facilitated expression of thoughts and feelings, Explored spiritual coping/struggle/distress, and Engaged in theological reflection  Family/Friends Interventions include: No family/friends present    Patient Plan of Care: Spiritual Care available upon further referral  Family/Friends Plan of Care: Spiritual Care available upon further referral    Electronically signed by RANGER MEDINA

## 2025-04-20 NOTE — PROGRESS NOTES
choroidal neovascularization (HCC)     Full dentures     full upper- partial lower    Gastroesophageal reflux disease without esophagitis     GERD (gastroesophageal reflux disease)     Hiatal hernia     Hip fracture (HCC)     History of blood transfusion     History of decreased platelet count     Hyperlipemia     Hypertension     Immune thrombocytopenic purpura (HCC)     Internal hemorrhoid     Localized edema     Long term (current) use of anticoagulants     Long term (current) use of oral hypoglycemic drugs     Long term current use of aspirin     Long term current use of insulin (HCC)     Macular degeneration     Macular degeneration of left eye 1980's    S/P laser treatment    Major depressive disorder, recurrent, in remission     Mixed incontinence     Nausea and vomiting     Neuromuscular dysfunction of bladder, unspecified     feet    Neuropathy     Obesity, unspecified     Occlusion and stenosis of unspecified cerebral artery     Osteoarthritis     Other abnormalities of gait and mobility     states one leg is longer than other- wears elevated boot to rt fot    Other cervical disc degeneration, unspecified cervical region     Other chronic pain     Other hemorrhoids     Other malaise     Other specified disorders of bladder     suprapubic catheter - Oct 2024    Pain in left ankle and joints of left foot     Personal history of colonic polyps     Personal history of healed osteoporosis fracture     Personal history of nicotine dependence     Personal history of transient ischemic attack (TIA), and cerebral infarction without residual deficits     Personal history of urinary (tract) infections     Polyosteoarthritis, unspecified     Presence of artificial hip joint, bilateral     Presence of artificial knee joint, bilateral     Presence of coronary angioplasty implant and graft     Presence of intraocular lens     Presence of urogenital implant     Pressure ulcer of left buttock, stage 2 ([facility])     Pressure   8:42 PM   Result Value Ref Range    POC Glucose 126 (H) 65 - 105 mg/dL   Hemoglobin and Hematocrit    Collection Time: 04/19/25 11:32 PM   Result Value Ref Range    Hemoglobin 9.0 (L) 12.0 - 16.0 g/dL    Hematocrit 27.6 (L) 36 - 46 %       Imaging/Diagonstics:  Recent data reviewed    Assessment :      Primary Problem  Incontinence in female    Active Hospital Problems    Diagnosis Date Noted    Recurrent major depressive disorder, in remission [F33.40] 09/20/2022     Priority: Medium    Incontinence in female [R32] 04/18/2025    Suprapubic catheter (HCC) [Z93.59] 04/10/2025    Longstanding persistent atrial fibrillation (HCC) [I48.11] 06/12/2024    Parkinson's disease [G20.A1] 06/12/2024    Presence of drug-eluting stent in right coronary artery [Z95.5] 10/12/2022    Coronary angioplasty status [Z98.61] 08/25/2022    Type 2 diabetes mellitus with diabetic peripheral angiopathy without gangrene, without long-term current use of insulin (HCC) [E11.51] 11/23/2020    Chronic ITP (idiopathic thrombocytopenia) (HCC) [D69.3] 04/12/2017    Diabetic gastroparesis (HCC) [E11.43, K31.84]     Pure hypercholesterolemia [E78.00] 03/01/2016    Essential hypertension [I10] 10/14/2015    Gastroesophageal reflux disease without esophagitis [K21.9] 10/14/2015    Thrombocytopenia [D69.6] 07/15/2014    Macular degeneration of left eye [H35.30]     Diabetic peripheral neuropathy (HCC) [E11.42] 10/24/2012       Plan:   84-year-old female with type 2 diabetes, neurogenic bladder Parkinson's disease, ITP, persistent A-fib, CAD status post stent 2022, chronic infection of left knee prosthesis follows with ID.  Has history of sigmoid resection colostomy and takedown for prior diverticular disease.  Past several years she has suffered with rectal leakage rectal prolapse pelvic floor weakness and colostomy planned due to recurrent bowel issues and recurrent buttock ulceration .  Has suprapubic catheter put in 2024 due to need for recurrent

## 2025-04-20 NOTE — PLAN OF CARE
Problem: Safety - Adult  Goal: Free from fall injury  Outcome: Progressing     Problem: Pain  Goal: Verbalizes/displays adequate comfort level or baseline comfort level  Outcome: Progressing  Flowsheets  Taken 4/20/2025 1616  Verbalizes/displays adequate comfort level or baseline comfort level:   Encourage patient to monitor pain and request assistance   Administer analgesics based on type and severity of pain and evaluate response   Assess pain using appropriate pain scale   Implement non-pharmacological measures as appropriate and evaluate response   Consider cultural and social influences on pain and pain management   Notify Licensed Independent Practitioner if interventions unsuccessful or patient reports new pain  Taken 4/20/2025 1400  Verbalizes/displays adequate comfort level or baseline comfort level:   Encourage patient to monitor pain and request assistance   Assess pain using appropriate pain scale   Administer analgesics based on type and severity of pain and evaluate response   Consider cultural and social influences on pain and pain management   Notify Licensed Independent Practitioner if interventions unsuccessful or patient reports new pain   Implement non-pharmacological measures as appropriate and evaluate response  Taken 4/20/2025 0856  Verbalizes/displays adequate comfort level or baseline comfort level:   Encourage patient to monitor pain and request assistance   Assess pain using appropriate pain scale   Administer analgesics based on type and severity of pain and evaluate response   Implement non-pharmacological measures as appropriate and evaluate response   Consider cultural and social influences on pain and pain management   Notify Licensed Independent Practitioner if interventions unsuccessful or patient reports new pain     Problem: Chronic Conditions and Co-morbidities  Goal: Patient's chronic conditions and co-morbidity symptoms are monitored and maintained or improved  Outcome:  Progressing  Flowsheets (Taken 4/20/2025 0830)  Care Plan - Patient's Chronic Conditions and Co-Morbidity Symptoms are Monitored and Maintained or Improved: Monitor and assess patient's chronic conditions and comorbid symptoms for stability, deterioration, or improvement     Problem: Discharge Planning  Goal: Discharge to home or other facility with appropriate resources  Outcome: Progressing  Flowsheets (Taken 4/20/2025 0830)  Discharge to home or other facility with appropriate resources:   Identify barriers to discharge with patient and caregiver   Arrange for needed discharge resources and transportation as appropriate   Identify discharge learning needs (meds, wound care, etc)     Problem: Skin/Tissue Integrity  Goal: Skin integrity remains intact  Description: 1.  Monitor for areas of redness and/or skin breakdown2.  Assess vascular access sites hourly3.  Every 4-6 hours minimum:  Change oxygen saturation probe site4.  Every 4-6 hours:  If on nasal continuous positive airway pressure, respiratory therapy assess nares and determine need for appliance change or resting period  Outcome: Progressing  Flowsheets (Taken 4/20/2025 0830)  Skin Integrity Remains Intact:   Monitor for areas of redness and/or skin breakdown   Assess vascular access sites hourly

## 2025-04-20 NOTE — PROGRESS NOTES
Patient sitting up in bed looking quite .  Tells me she is tolerating a clear liquid diet.  Has had some flatus via her stoma bag.    Vital signs overnight, blood pressure little bit on the low side low-grade temperature about 99.    Patient with extremely mild productive cough states she has had a little bit difficulty to simply coughing things up due to incisional discomfort.  She is however pulling about 1500 on her incentive spirometry.  Encourage pulmonary toilet.    Abdomen rounded and soft.  Stoma viable with some gas in bag  Both incision sites clean and dry.    Laboratory evaluation shows fairly normal electrolytes.  Hemoglobin is 9.5.  Note minimal elevation of white count.    Will repeat labs tomorrow morning  Advance diet to full liquids  Encourage pulmonary toilet  Encourage ambulation.  Patient does have a walker to assist.

## 2025-04-20 NOTE — PLAN OF CARE
Problem: Safety - Adult  Goal: Free from fall injury  4/20/2025 0216 by Ryan Cm LPN  Outcome: Progressing     Problem: Pain  Goal: Verbalizes/displays adequate comfort level or baseline comfort level  4/20/2025 0216 by Ryan Cm LPN  Outcome: Progressing     Problem: Chronic Conditions and Co-morbidities  Goal: Patient's chronic conditions and co-morbidity symptoms are monitored and maintained or improved  4/20/2025 0216 by Ryan Cm LPN  Outcome: Progressing     Problem: Discharge Planning  Goal: Discharge to home or other facility with appropriate resources  4/20/2025 0216 by Ryan Cm LPN  Outcome: Progressing     Problem: Skin/Tissue Integrity  Goal: Skin integrity remains intact  Description: 1.  Monitor for areas of redness and/or skin breakdown2.  Assess vascular access sites hourly3.  Every 4-6 hours minimum:  Change oxygen saturation probe site4.  Every 4-6 hours:  If on nasal continuous positive airway pressure, respiratory therapy assess nares and determine need for appliance change or resting period  4/20/2025 0216 by Ryan Cm LPN  Outcome: Progressing

## 2025-04-21 LAB
ANION GAP SERPL CALCULATED.3IONS-SCNC: 9 MMOL/L (ref 9–17)
BUN SERPL-MCNC: 15 MG/DL (ref 8–23)
CALCIUM SERPL-MCNC: 8.3 MG/DL (ref 8.6–10.4)
CHLORIDE SERPL-SCNC: 109 MMOL/L (ref 98–107)
CO2 SERPL-SCNC: 21 MMOL/L (ref 20–31)
CREAT SERPL-MCNC: 0.7 MG/DL (ref 0.5–0.9)
ERYTHROCYTE [DISTWIDTH] IN BLOOD BY AUTOMATED COUNT: 15.9 % (ref 12.5–15.4)
GFR, ESTIMATED: 86 ML/MIN/1.73M2
GLUCOSE BLD-MCNC: 108 MG/DL (ref 65–105)
GLUCOSE BLD-MCNC: 146 MG/DL (ref 65–105)
GLUCOSE BLD-MCNC: 156 MG/DL (ref 65–105)
GLUCOSE BLD-MCNC: 174 MG/DL (ref 65–105)
GLUCOSE SERPL-MCNC: 153 MG/DL (ref 70–99)
HCT VFR BLD AUTO: 26.8 % (ref 36–46)
HGB BLD-MCNC: 8.9 G/DL (ref 12–16)
MCH RBC QN AUTO: 29.3 PG (ref 26–34)
MCHC RBC AUTO-ENTMCNC: 33.1 G/DL (ref 31–37)
MCV RBC AUTO: 88.5 FL (ref 80–100)
PLATELET # BLD AUTO: 96 K/UL (ref 140–450)
PMV BLD AUTO: 12 FL (ref 6–12)
POTASSIUM SERPL-SCNC: 4.4 MMOL/L (ref 3.7–5.3)
RBC # BLD AUTO: 3.03 M/UL (ref 4–5.2)
SODIUM SERPL-SCNC: 139 MMOL/L (ref 135–144)
WBC OTHER # BLD: 7.1 K/UL (ref 3.5–11)

## 2025-04-21 PROCEDURE — 1200000000 HC SEMI PRIVATE

## 2025-04-21 PROCEDURE — 36415 COLL VENOUS BLD VENIPUNCTURE: CPT

## 2025-04-21 PROCEDURE — 85027 COMPLETE CBC AUTOMATED: CPT

## 2025-04-21 PROCEDURE — 97116 GAIT TRAINING THERAPY: CPT

## 2025-04-21 PROCEDURE — 80048 BASIC METABOLIC PNL TOTAL CA: CPT

## 2025-04-21 PROCEDURE — 6360000002 HC RX W HCPCS: Performed by: SURGERY

## 2025-04-21 PROCEDURE — 99232 SBSQ HOSP IP/OBS MODERATE 35: CPT | Performed by: INTERNAL MEDICINE

## 2025-04-21 PROCEDURE — 97535 SELF CARE MNGMENT TRAINING: CPT

## 2025-04-21 PROCEDURE — 6370000000 HC RX 637 (ALT 250 FOR IP): Performed by: INTERNAL MEDICINE

## 2025-04-21 PROCEDURE — 97166 OT EVAL MOD COMPLEX 45 MIN: CPT

## 2025-04-21 PROCEDURE — 97162 PT EVAL MOD COMPLEX 30 MIN: CPT

## 2025-04-21 PROCEDURE — 82947 ASSAY GLUCOSE BLOOD QUANT: CPT

## 2025-04-21 PROCEDURE — 97530 THERAPEUTIC ACTIVITIES: CPT

## 2025-04-21 PROCEDURE — 6370000000 HC RX 637 (ALT 250 FOR IP): Performed by: SURGERY

## 2025-04-21 PROCEDURE — 2500000003 HC RX 250 WO HCPCS: Performed by: SURGERY

## 2025-04-21 PROCEDURE — 30233N1 TRANSFUSION OF NONAUTOLOGOUS RED BLOOD CELLS INTO PERIPHERAL VEIN, PERCUTANEOUS APPROACH: ICD-10-PCS | Performed by: INTERNAL MEDICINE

## 2025-04-21 RX ORDER — MULTIVITAMIN WITH IRON
1 TABLET ORAL 2 TIMES DAILY
Status: DISCONTINUED | OUTPATIENT
Start: 2025-04-21 | End: 2025-04-25 | Stop reason: HOSPADM

## 2025-04-21 RX ADMIN — GUAIFENESIN 1200 MG: 600 TABLET, EXTENDED RELEASE ORAL at 21:42

## 2025-04-21 RX ADMIN — TIZANIDINE 2 MG: 4 TABLET ORAL at 22:50

## 2025-04-21 RX ADMIN — PANTOPRAZOLE SODIUM 40 MG: 40 TABLET, DELAYED RELEASE ORAL at 05:21

## 2025-04-21 RX ADMIN — TROSPIUM CHLORIDE 20 MG: 20 TABLET, FILM COATED ORAL at 15:45

## 2025-04-21 RX ADMIN — DOXYCYCLINE HYCLATE 100 MG: 100 TABLET, COATED ORAL at 21:42

## 2025-04-21 RX ADMIN — TROSPIUM CHLORIDE 20 MG: 20 TABLET, FILM COATED ORAL at 05:21

## 2025-04-21 RX ADMIN — RANOLAZINE 500 MG: 500 TABLET, EXTENDED RELEASE ORAL at 21:42

## 2025-04-21 RX ADMIN — RANOLAZINE 500 MG: 500 TABLET, EXTENDED RELEASE ORAL at 08:57

## 2025-04-21 RX ADMIN — GABAPENTIN 100 MG: 100 CAPSULE ORAL at 21:42

## 2025-04-21 RX ADMIN — SENNOSIDES 8.6 MG: 8.6 TABLET, COATED ORAL at 08:57

## 2025-04-21 RX ADMIN — GABAPENTIN 100 MG: 100 CAPSULE ORAL at 08:57

## 2025-04-21 RX ADMIN — ENOXAPARIN SODIUM 40 MG: 100 INJECTION SUBCUTANEOUS at 09:00

## 2025-04-21 RX ADMIN — DOXYCYCLINE HYCLATE 100 MG: 100 TABLET, COATED ORAL at 08:57

## 2025-04-21 RX ADMIN — SENNOSIDES 8.6 MG: 8.6 TABLET, COATED ORAL at 21:42

## 2025-04-21 RX ADMIN — SODIUM CHLORIDE, PRESERVATIVE FREE 10 ML: 5 INJECTION INTRAVENOUS at 09:00

## 2025-04-21 RX ADMIN — GUAIFENESIN 1200 MG: 600 TABLET, EXTENDED RELEASE ORAL at 08:57

## 2025-04-21 RX ADMIN — CLINDAMYCIN HYDROCHLORIDE 150 MG: 150 CAPSULE ORAL at 17:40

## 2025-04-21 NOTE — PROGRESS NOTES
[facility] Nurse  New Ostomy Progress Note      NAME:  Komal Crawford  MEDICAL RECORD NUMBER:  [id number]  AGE: 84 y.o.   GENDER:  female  :  1940  TODAY'S DATE:  2025    Subjective      WOC nursing consult for new colostomy creation    Type of ostomy: colostomy     Location of ostomy: LUQ    Date of surgery/surgeon:  Dr Lawler    Ostomy history: Patient s/p colostomy creation due to many years of chronic constipation and incontinence. C/O difficulty with hygiene and wounds to buttocks. Of note, patient has previous history of sigmoid resection colostomy several years ago with reversal.       Objective    /65   Pulse 70   Temp 98.8 °F (37.1 °C) (Oral)   Resp 18   Ht 1.448 m (4' 9.01\")   Wt 63 kg (139 lb)   SpO2 96%   BMI 30.07 kg/m²     Donavon Risk Score Donavon Scale Score: 18    Patient Active Problem List   Diagnosis Code    TIA (transient ischemic attack) G45.9    Chronic back pain M54.9, G89.29    Diabetic peripheral neuropathy (HCC) E11.42    Macular degeneration of left eye H35.30    Constipation K59.00    Thrombocytopenia D69.6    B12 deficiency E53.8    Vitamin D deficiency E55.9    Anemia D64.9    DDD (degenerative disc disease), cervical M50.30    Age-related cognitive decline R41.81    Acquired cyst of kidney N28.1    Microscopic hematuria R31.29    Bilateral renal cysts N28.1    Essential hypertension I10    Gastroesophageal reflux disease without esophagitis K21.9    Mixed incontinence N39.46    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

## 2025-04-21 NOTE — PLAN OF CARE
Problem: Safety - Adult  Goal: Free from fall injury  4/21/2025 1108 by Migdalia Reyez RN  Outcome: Progressing  4/21/2025 0447 by Ryan Cm LPN  Outcome: Progressing     Problem: Pain  Goal: Verbalizes/displays adequate comfort level or baseline comfort level  4/21/2025 1108 by Migdalia Reyez RN  Outcome: Progressing  4/21/2025 0447 by Ryan Cm LPN  Outcome: Progressing     Problem: Chronic Conditions and Co-morbidities  Goal: Patient's chronic conditions and co-morbidity symptoms are monitored and maintained or improved  4/21/2025 1108 by Migdalia Reyez RN  Outcome: Progressing  4/21/2025 0447 by Ryan Cm LPN  Outcome: Progressing     Problem: Discharge Planning  Goal: Discharge to home or other facility with appropriate resources  4/21/2025 1108 by Migdalia Reyez RN  Outcome: Progressing  4/21/2025 0447 by Ryan Cm LPN  Outcome: Progressing     Problem: Skin/Tissue Integrity  Goal: Skin integrity remains intact  Description: 1.  Monitor for areas of redness and/or skin breakdown2.  Assess vascular access sites hourly3.  Every 4-6 hours minimum:  Change oxygen saturation probe site4.  Every 4-6 hours:  If on nasal continuous positive airway pressure, respiratory therapy assess nares and determine need for appliance change or resting period  4/21/2025 1108 by Migdalia Reyez RN  Outcome: Progressing  4/21/2025 0447 by Ryan Cm LPN  Outcome: Progressing

## 2025-04-21 NOTE — PROGRESS NOTES
Occupational Therapy  Occupational Therapy Initial Evaluation  Facility/Department: [address]   Patient Name: Komal Crawford        MRN: [id number]    : 1940    Date of Service: 2025    No chief complaint on file.    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), Body mass index 31.0-31.9, adult, Bronchitis, Caffeine use, Cataract extraction status of eye, left, Cataract extraction status of right eye, Cellulitis of left lower limb, Cerebral artery occlusion with cerebral infarction ([facility]), Chronic anticoagulation, Chronic back pain, Chronic ITP (idiopathic thrombocytopenia) ([facility]), Constipation, unspecified, CVA (cerebral infarction), Cyst of kidney, acquired, Deficiency of other specified B group vitamins, Diabetic gastroparesis ([facility]), Diaphragmatic hernia without obstruction or gangrene, Diverticular disease, Diverticulosis of intestine without perforation or abscess without bleeding, Dorsalgia, unspecified, Essential tremor, Exudative age-related macular degeneration, left eye, with active choroidal neovascularization ([facility]), Full dentures, Gastroesophageal reflux disease without esophagitis, GERD (gastroesophageal reflux disease), Hiatal hernia, Hip fracture ([facility]), History of blood transfusion, History of decreased platelet count, Hyperlipemia, Hypertension, Immune thrombocytopenic purpura ([facility]), Internal hemorrhoid, Localized edema, Long term (current) use of anticoagulants, Long term (current) use of oral hypoglycemic drugs, Long term current use of aspirin, Long term current use of insulin ([facility]), Macular

## 2025-04-21 NOTE — CARE COORDINATION
CM spoke with patient and spouse at bedside regarding SNF vs HHC. PT/OT recommended SNF. Patient is adement that she does not want to go to SNF. Patient is agreeable to [facility], she is current with [facility]. [place] [facility] in to see patient and discuss. Patient has new ostomy and needs to be able to learn how to care for it. Wound eval order placed.

## 2025-04-21 NOTE — PROGRESS NOTES
Comprehensive Nutrition Assessment    Type and Reason for Visit:       Nutrition Recommendations/Plan:   Continue full liquid diet; diet advancement per provider  Initiate wound healing supplement BID   Monitor labs, intakes, weights, skin status        Nutrition Assessment:    84-year-old female with a previous history of sigmoid resection colostomy and takedown for prior diverticular disease.  She states over the last several years ever since her bowels are poor particular she is having difficulties with both chronic constipation but also particularly incontinence. She is s/p LUQ colostomy 4/18. Monitor output. Positive nutrition screen for pressure injury - stage 2 right buttocks. Pranay BID initiated 4/21 for wound healing. Full liquid diet w/ intakes documented per flowsheets at %. Noted approx. 9.2 lb weight loss (6% - non signficant) since last RDN assessment x 6 months. RDN follow up PRN and per protocol.    Nutrition Related Findings:    Labs and meds reviewed. Wound Type: Pressure Injury, Stage II, Surgical Incision       Current Nutrition Intake & Therapies:    Average Meal Intake: %     ADULT DIET; Full Liquid  ADULT ORAL NUTRITION SUPPLEMENT; Lunch, Dinner; Wound Healing Oral Supplement    Anthropometric Measures:  Height: 144.8 cm (4' 9.01\")  Ideal Body Weight (IBW): 85 lbs (39 kg)       Current Body Weight:  ,   IBW.    Current BMI (kg/m2):             Weight Adjustment For: No Adjustment                 BMI Categories: Obese Class 1 (BMI 30.0-34.9)    Estimated Daily Nutrient Needs:  Energy Requirements Based On: Formula     Energy (kcal/day): 6892-3436  Weight Used for Protein Requirements: Current  Protein (g/day): 108-118  Method Used for Fluid Requirements: 1 ml/kcal  Fluid (ml/day): 1400    Nutrition Diagnosis:   Increased nutrient needs related to increase demand for energy/nutrients as evidenced by wounds    Nutrition Interventions:   Food and/or Nutrient Delivery: Continue Current

## 2025-04-21 NOTE — PLAN OF CARE
Problem: Safety - Adult  Goal: Free from fall injury  4/21/2025 0447 by Ryan Cm LPN  Outcome: Progressing     Problem: Pain  Goal: Verbalizes/displays adequate comfort level or baseline comfort level  4/21/2025 0447 by Ryan Cm LPN  Outcome: Progressing     Problem: Chronic Conditions and Co-morbidities  Goal: Patient's chronic conditions and co-morbidity symptoms are monitored and maintained or improved  4/21/2025 0447 by Ryan Cm LPN  Outcome: Progressing     Problem: Discharge Planning  Goal: Discharge to home or other facility with appropriate resources  4/21/2025 0447 by Ryan Cm LPN  Outcome: Progressing     Problem: Skin/Tissue Integrity  Goal: Skin integrity remains intact  Description: 1.  Monitor for areas of redness and/or skin breakdown2.  Assess vascular access sites hourly3.  Every 4-6 hours minimum:  Change oxygen saturation probe site4.  Every 4-6 hours:  If on nasal continuous positive airway pressure, respiratory therapy assess nares and determine need for appliance change or resting period  4/21/2025 0447 by Ryan Cm LPN  Outcome: Progressing

## 2025-04-21 NOTE — PROGRESS NOTES
Tavia Eubanks PA-C   doxycycline hyclate (VIBRA-TABS) 100 MG tablet Take 1 tablet by mouth 2 times daily 1/31/25 5/1/25 Yes David Arteaga MD   lidocaine 2 % uro-jet Apply topically as needed for Pain 1/8/25  Yes Omega Lawson MD   furosemide (LASIX) 20 MG tablet TAKE ONE (1) TABLET BY MOUTH DAILY 1/2/25  Yes Sabiha Bedolla MD   metoprolol succinate (TOPROL XL) 25 MG extended release tablet  11/18/24  Yes ProviderAna MD   tiZANidine (ZANAFLEX) 2 MG tablet  12/3/24  Yes Provider, MD Ana   solifenacin (VESICARE) 10 MG tablet Take 1 tablet by mouth daily 12/5/24  Yes Omega Lawson MD   BISACODYL 5 MG EC tablet TAKE ONE (1) TABLET BY MOUTH TWO (2) TIMES DAILY AS NEEDED FOR CONSTIPATION 9/20/24  Yes Sabiha Bedolla MD   midodrine (PROAMATINE) 2.5 MG tablet Take 1 tablet by mouth 3 times daily Every 8 hrs prn  6/25/24 Per [facility], they have have never had a prescription for this   Yes Provider, MD Ana   Multiple Vitamins-Minerals (THERAPEUTIC MULTIVITAMIN-MINERALS) tablet Take 1 tablet by mouth daily   Yes Provider, MD Ana   omeprazole (PRILOSEC) 20 MG delayed release capsule Take 1 capsule by mouth Daily   Yes Provider, MD Ana   senna (SENOKOT) 8.6 MG tablet Take 1 tablet by mouth 2 times daily For constipation   Yes Provider, Historical, MD   aspirin 81 MG chewable tablet Take 1 tablet by mouth daily 3/12/24  Yes Mya Melendez MD   Continuous Glucose  (FREESTYLE DAVIS 3 READER) MATTHEW  3/6/25   ProviderAna MD   insulin glargine (LANTUS SOLOSTAR) 100 UNIT/ML injection pen Inject 10 Units into the skin daily as needed (sugar goes up to 250 or higher)  Patient not taking: Reported on 4/18/2025 4/10/25   Sabiha Bedolla MD   Misc. Devices MISC [employer] Ankle brace, left 1/30/25   Benja Cedillo PA   dicyclomine (BENTYL) 10 MG capsule Take 1 capsule by mouth as needed (for bladder smasms)  Patient not taking: Reported on 4/18/2025     107 mmol/L    CO2 20 20 - 31 mmol/L    Anion Gap 9 9 - 17 mmol/L    Glucose 141 (H) 70 - 99 mg/dL    BUN 18 8 - 23 mg/dL    Creatinine 0.9 0.5 - 0.9 mg/dL    Est, Glom Filt Rate 63 >60 mL/min/1.73m2    Calcium 8.1 (L) 8.6 - 10.4 mg/dL   POC Glucose Fingerstick    Collection Time: 04/20/25  8:13 AM   Result Value Ref Range    POC Glucose 111 (H) 65 - 105 mg/dL   POC Glucose Fingerstick    Collection Time: 04/20/25 11:34 AM   Result Value Ref Range    POC Glucose 83 65 - 105 mg/dL   POC Glucose Fingerstick    Collection Time: 04/20/25  4:28 PM   Result Value Ref Range    POC Glucose 65 65 - 105 mg/dL   POC Glucose Fingerstick    Collection Time: 04/20/25 10:16 PM   Result Value Ref Range    POC Glucose 92 65 - 105 mg/dL   Basic Metabolic Panel    Collection Time: 04/21/25  6:24 AM   Result Value Ref Range    Sodium 139 135 - 144 mmol/L    Potassium 4.4 3.7 - 5.3 mmol/L    Chloride 109 (H) 98 - 107 mmol/L    CO2 21 20 - 31 mmol/L    Anion Gap 9 9 - 17 mmol/L    Glucose 153 (H) 70 - 99 mg/dL    BUN 15 8 - 23 mg/dL    Creatinine 0.7 0.5 - 0.9 mg/dL    Est, Glom Filt Rate 86 >60 mL/min/1.73m2    Calcium 8.3 (L) 8.6 - 10.4 mg/dL       Imaging/Diagonstics:  Recent data reviewed    Assessment :      Primary Problem  Incontinence in female    Active Hospital Problems    Diagnosis Date Noted    Recurrent major depressive disorder, in remission [F33.40] 09/20/2022     Priority: Medium    Incontinence in female [R32] 04/18/2025    Suprapubic catheter (HCC) [Z93.59] 04/10/2025    Longstanding persistent atrial fibrillation (HCC) [I48.11] 06/12/2024    Parkinson's disease [G20.A1] 06/12/2024    Presence of drug-eluting stent in right coronary artery [Z95.5] 10/12/2022    Coronary angioplasty status [Z98.61] 08/25/2022    Type 2 diabetes mellitus with diabetic peripheral angiopathy without gangrene, without long-term current use of insulin (HCC) [E11.51] 11/23/2020    Chronic ITP (idiopathic thrombocytopenia) (HCC) [D69.3]

## 2025-04-21 NOTE — PROGRESS NOTES
Physical Therapy  Facility/Department: [address]   Physical Therapy Initial Evaluation    Patient Name: Komal Crawfodr        MRN: [id number]    : 1940    Date of Service: 2025    Pt presented for colostomy creation.     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[facility]), Body mass index 31.0-31.9, adult, Bronchitis, Caffeine use, Cataract extraction status of eye, left, Cataract extraction status of right eye, Cellulitis of left lower limb, Cerebral artery occlusion with cerebral infarction ([facility]), Chronic anticoagulation, Chronic back pain, Chronic ITP (idiopathic thrombocytopenia) ([facility]), Constipation, unspecified, CVA (cerebral infarction), Cyst of kidney, acquired, Deficiency of other specified B group vitamins, Diabetic gastroparesis ([facility]), Diaphragmatic hernia without obstruction or gangrene, Diverticular disease, Diverticulosis of intestine without perforation or abscess without bleeding, Dorsalgia, unspecified, Essential tremor, Exudative age-related macular degeneration, left eye, with active choroidal neovascularization ([facility]), Full dentures, Gastroesophageal reflux disease without esophagitis, GERD (gastroesophageal reflux disease), Hiatal hernia, Hip fracture ([facility]), History of blood transfusion, History of decreased platelet count, Hyperlipemia, Hypertension, Immune thrombocytopenic purpura ([facility]), Internal hemorrhoid, Localized edema, Long term (current) use of anticoagulants, Long term (current) use of oral hypoglycemic drugs, Long term current use of aspirin, Long term current use of insulin ([facility]), Macular

## 2025-04-21 NOTE — PROGRESS NOTES
Patient sitting up at bedside looking fairly comfortable.  The family was able to bring her leg brace and ankle brace in today and she was able to walk around the room with her walker without too much difficulties.  Tolerating full liquid diet and having liquid stool via stoma.    Vital signs fairly unremarkable.  Heart rate normal and afebrile    Midline incision clean and dry.  Stoma pink viable and producing some liquid green stool.    Electrolytes fairly unremarkable.  Sugars in the mid 100 range today.    White count normal hemoglobin down to 8.9 but patient does have a history of chronic anemia issues for which she does see hematology downstairs as an outpatient.    Patient doing well after her colostomy creation  Will pass to regular diet  IV fluid already off  Ambulating in her room  Will discontinue oxygen as nurses stated O2 sats on room air is running about 96%.    Patient declines going to rehab.  She would rather go home.  She has had a colostomy previously some decades ago and should be fairly familiar with working with this but enough visiting nurses are trying to be arranged by .  Hopefully able to be discharged home tomorrow

## 2025-04-21 NOTE — CARE COORDINATION
[facility] Quality Flow/Interdisciplinary Rounds Progress Note    Quality Flow Rounds held on April 21, 2025 at 0930    Disciplines Attending:  Bedside Nurse, , and Nursing Unit Leadership    Barriers to Discharge: clinical status     Anticipated Discharge Date:   TBD     Anticipated Discharge Disposition: Home with [facility], [facility] RISK OF UNPLANNED READMISSION 2.0             18.4 Total Score        Discussed patient goal for the day, patient clinical progression, and barriers to discharge.  Wound care eval needed for new ostomy teaching, PT/OT orders needed, and patient plans to resume care with Kalpesh at discharge.       Sterling Amos  April 21, 2025

## 2025-04-22 LAB
ANION GAP SERPL CALCULATED.3IONS-SCNC: 8 MMOL/L (ref 9–17)
BUN SERPL-MCNC: 20 MG/DL (ref 8–23)
CALCIUM SERPL-MCNC: 8.4 MG/DL (ref 8.6–10.4)
CHLORIDE SERPL-SCNC: 107 MMOL/L (ref 98–107)
CO2 SERPL-SCNC: 23 MMOL/L (ref 20–31)
CREAT SERPL-MCNC: 0.7 MG/DL (ref 0.5–0.9)
ERYTHROCYTE [DISTWIDTH] IN BLOOD BY AUTOMATED COUNT: 16.2 % (ref 12.5–15.4)
GFR, ESTIMATED: 86 ML/MIN/1.73M2
GLUCOSE BLD-MCNC: 150 MG/DL (ref 65–105)
GLUCOSE BLD-MCNC: 160 MG/DL (ref 65–105)
GLUCOSE BLD-MCNC: 177 MG/DL (ref 65–105)
GLUCOSE BLD-MCNC: 197 MG/DL (ref 65–105)
GLUCOSE SERPL-MCNC: 203 MG/DL (ref 70–99)
HCT VFR BLD AUTO: 29 % (ref 36–46)
HGB BLD-MCNC: 9.6 G/DL (ref 12–16)
MCH RBC QN AUTO: 29.3 PG (ref 26–34)
MCHC RBC AUTO-ENTMCNC: 33.2 G/DL (ref 31–37)
MCV RBC AUTO: 88.2 FL (ref 80–100)
PLATELET # BLD AUTO: 125 K/UL (ref 140–450)
PMV BLD AUTO: 12.2 FL (ref 6–12)
POTASSIUM SERPL-SCNC: 4.5 MMOL/L (ref 3.7–5.3)
RBC # BLD AUTO: 3.29 M/UL (ref 4–5.2)
SODIUM SERPL-SCNC: 138 MMOL/L (ref 135–144)
WBC OTHER # BLD: 8.2 K/UL (ref 3.5–11)

## 2025-04-22 PROCEDURE — 85027 COMPLETE CBC AUTOMATED: CPT

## 2025-04-22 PROCEDURE — 80048 BASIC METABOLIC PNL TOTAL CA: CPT

## 2025-04-22 PROCEDURE — 82947 ASSAY GLUCOSE BLOOD QUANT: CPT

## 2025-04-22 PROCEDURE — 36415 COLL VENOUS BLD VENIPUNCTURE: CPT

## 2025-04-22 PROCEDURE — 1200000000 HC SEMI PRIVATE

## 2025-04-22 PROCEDURE — 6360000002 HC RX W HCPCS: Performed by: SURGERY

## 2025-04-22 PROCEDURE — 99213 OFFICE O/P EST LOW 20 MIN: CPT

## 2025-04-22 PROCEDURE — 97530 THERAPEUTIC ACTIVITIES: CPT

## 2025-04-22 PROCEDURE — 6370000000 HC RX 637 (ALT 250 FOR IP): Performed by: SURGERY

## 2025-04-22 PROCEDURE — 6370000000 HC RX 637 (ALT 250 FOR IP): Performed by: INTERNAL MEDICINE

## 2025-04-22 PROCEDURE — 99231 SBSQ HOSP IP/OBS SF/LOW 25: CPT | Performed by: HOSPITALIST

## 2025-04-22 PROCEDURE — 97116 GAIT TRAINING THERAPY: CPT

## 2025-04-22 RX ADMIN — SENNOSIDES 8.6 MG: 8.6 TABLET, COATED ORAL at 08:46

## 2025-04-22 RX ADMIN — DOXYCYCLINE HYCLATE 100 MG: 100 TABLET, COATED ORAL at 21:56

## 2025-04-22 RX ADMIN — PANTOPRAZOLE SODIUM 40 MG: 40 TABLET, DELAYED RELEASE ORAL at 05:18

## 2025-04-22 RX ADMIN — DOXYCYCLINE HYCLATE 100 MG: 100 TABLET, COATED ORAL at 08:46

## 2025-04-22 RX ADMIN — TROSPIUM CHLORIDE 20 MG: 20 TABLET, FILM COATED ORAL at 15:50

## 2025-04-22 RX ADMIN — RANOLAZINE 500 MG: 500 TABLET, EXTENDED RELEASE ORAL at 21:55

## 2025-04-22 RX ADMIN — TROSPIUM CHLORIDE 20 MG: 20 TABLET, FILM COATED ORAL at 05:18

## 2025-04-22 RX ADMIN — SENNOSIDES 8.6 MG: 8.6 TABLET, COATED ORAL at 21:56

## 2025-04-22 RX ADMIN — Medication 1 TABLET: at 08:49

## 2025-04-22 RX ADMIN — RANOLAZINE 500 MG: 500 TABLET, EXTENDED RELEASE ORAL at 08:46

## 2025-04-22 RX ADMIN — GABAPENTIN 100 MG: 100 CAPSULE ORAL at 21:56

## 2025-04-22 RX ADMIN — INSULIN LISPRO 1 UNITS: 100 INJECTION, SOLUTION INTRAVENOUS; SUBCUTANEOUS at 21:55

## 2025-04-22 RX ADMIN — TIZANIDINE 2 MG: 4 TABLET ORAL at 21:56

## 2025-04-22 RX ADMIN — GUAIFENESIN 1200 MG: 600 TABLET, EXTENDED RELEASE ORAL at 21:56

## 2025-04-22 RX ADMIN — GUAIFENESIN 1200 MG: 600 TABLET, EXTENDED RELEASE ORAL at 08:46

## 2025-04-22 RX ADMIN — ENOXAPARIN SODIUM 40 MG: 100 INJECTION SUBCUTANEOUS at 08:46

## 2025-04-22 RX ADMIN — GABAPENTIN 100 MG: 100 CAPSULE ORAL at 08:46

## 2025-04-22 NOTE — PLAN OF CARE
Problem: Safety - Adult  Goal: Free from fall injury  4/22/2025 1003 by Migdalia Reyez RN  Outcome: Progressing  4/22/2025 0121 by Ryan Cm LPN  Outcome: Progressing     Problem: Pain  Goal: Verbalizes/displays adequate comfort level or baseline comfort level  4/22/2025 1003 by Migdalia Reyez RN  Outcome: Progressing  4/22/2025 0121 by Ryan Cm LPN  Outcome: Progressing     Problem: Chronic Conditions and Co-morbidities  Goal: Patient's chronic conditions and co-morbidity symptoms are monitored and maintained or improved  4/22/2025 1003 by Migdalia Reyez RN  Outcome: Progressing  4/22/2025 0121 by Ryan Cm LPN  Outcome: Progressing     Problem: Discharge Planning  Goal: Discharge to home or other facility with appropriate resources  4/22/2025 1003 by Migdalia Reyez RN  Outcome: Progressing  4/22/2025 0121 by Ryan Cm LPN  Outcome: Progressing     Problem: Skin/Tissue Integrity  Goal: Skin integrity remains intact  Description: 1.  Monitor for areas of redness and/or skin breakdown2.  Assess vascular access sites hourly3.  Every 4-6 hours minimum:  Change oxygen saturation probe site4.  Every 4-6 hours:  If on nasal continuous positive airway pressure, respiratory therapy assess nares and determine need for appliance change or resting period  4/22/2025 1003 by Migdalia Reyez RN  Outcome: Progressing  4/22/2025 0121 by Ryan Cm LPN  Outcome: Not Progressing     Problem: Nutrition Deficit:  Goal: Optimize nutritional status  4/22/2025 1003 by Migdalia Reyez RN  Outcome: Progressing  4/22/2025 0121 by Ryan Cm LPN  Outcome: Progressing     Problem: Skin/Tissue Integrity  Goal: Skin integrity remains intact  Description: 1.  Monitor for areas of redness and/or skin breakdown2.  Assess vascular access sites hourly3.  Every 4-6 hours minimum:  Change oxygen saturation probe site4.  Every 4-6 hours:  If on nasal continuous positive airway pressure, respiratory therapy assess nares and

## 2025-04-22 NOTE — PLAN OF CARE
Problem: Skin/Tissue Integrity  Goal: Skin integrity remains intact  Description: 1.  Monitor for areas of redness and/or skin breakdown2.  Assess vascular access sites hourly3.  Every 4-6 hours minimum:  Change oxygen saturation probe site4.  Every 4-6 hours:  If on nasal continuous positive airway pressure, respiratory therapy assess nares and determine need for appliance change or resting period  Outcome: Not Progressing       Problem: Safety - Adult  Goal: Free from fall injury  Outcome: Progressing     Problem: Pain  Goal: Verbalizes/displays adequate comfort level or baseline comfort level  Outcome: Progressing     Problem: Chronic Conditions and Co-morbidities  Goal: Patient's chronic conditions and co-morbidity symptoms are monitored and maintained or improved  Outcome: Progressing     Problem: Discharge Planning  Goal: Discharge to home or other facility with appropriate resources  Outcome: Progressing     Problem: Nutrition Deficit:  Goal: Optimize nutritional status  Outcome: Progressing

## 2025-04-22 NOTE — PROGRESS NOTES
Patient states not doing quite as good today as yesterday.  Toleratingregular diet but not eating as much today and states feels full quickly   Did lso not ambulate as much today but patient believes that her leg brace was not on quite correctly    VS  afebrile    Midline incision clean and dry.   Stoma pink and small amount of liquid stool    BMP OK  WBC normal and hemaglobin stable    Patient now agreeable to go to rehab instead of home for help with strengthening and ambulation.  Have asked nurses to let SS know this.

## 2025-04-22 NOTE — PROGRESS NOTES
I   [facility]  Office: [phone]  Gordo Jimenez DO, Dejuan Hammer DO, Silver Jimenez DO, Eleazar Knox DO, July Patricia MD, Milena Forbes MD, Alis Head MD, Greer Womack MD,  Nakul Marie MD, Sravan Anthony MD, Louis Vaz MD,  Ceferino Jenkins DO, Mindi Claudio MD, Gopal Perry MD, Brandan Jimenez DO, Samantha Pinon MD,  Beltran Glynn DO, Rosa Alicea MD, Gabriela Rosales MD, Benson Raman MD,  Dami Trent MD, Roger Oconnell MD, Jos Lopez MD, Jodie Abraham MD, Cristino Melendrez MD, Marcos Weston MD, Torsten Duong DO, Heydi Maya MD, Ayaan Del Angel MD, Ceferino Castillo MD, Mohsin Reza, MD, Alvin Barbour MD, Shirley Waterhouse, CNP,  Dominga Devlin, CNP, Torsten Mcclendon, CNP,  Ivory Sellers, DNP, Monica Ahumada, CNP, Areli Grant, CNP, Lisha Guzman, CNP, Ladi Fraser, CNP, Divya Marino, PA-C, Chrissy Ramirez, CNP, Fermín Delgado, CNP,  Cindy Kennedy, CNP, Yessica Fung, CNP, Warner Puente, PA-C, Caitlyn Nettles, CNP,  Katheryn Krueger, CNS, Caroline Briseno, CNP, Brittany Arthur, CNP,   Yadira Padron, CNP              [facility]   IN-PATIENT SERVICE   [facility]           CONSULTATION NOTE            Date:   4/22/2025  Patient name:  Komal Crawford  Date of admission:  4/18/2025 10:07 AM  MRN:   [id number]  Account:  [id number]  YOB: 1940  PCP:    Sabiha Bedolla MD  Room:   [address] [place] [address]  Code Status:    Full Code    Physician Requesting Consult: Sherrie Lawler MD    Reason for Consult:  Medical management      Subjective:     84-year-old female with type 2 diabetes, Parkinson's disease, ITP, persistent A-fib, chronic infection of left knee prosthesis follows with ID.  Has history of sigmoid resection colostomy and takedown for prior diverticular disease.  Past several years she has suffered with rectal leakage rectal prolapse pelvic floor weakness and colostomy planned due to recurrent bowel issues and recurrent buttock  ([facility]) [E11.42] 10/24/2012       Plan:   84-year-old female with type 2 diabetes, neurogenic bladder Parkinson's disease, ITP, persistent A-fib, CAD status post stent 2022, chronic infection of left knee prosthesis follows with ID.  Has history of sigmoid resection colostomy and takedown for prior diverticular disease.  Past several years she has suffered with rectal leakage rectal prolapse pelvic floor weakness and colostomy planned due to recurrent bowel issues and recurrent buttock ulceration .  Has suprapubic catheter put in 2024 due to need for recurrent self cath.      underwent laparotomy with colostomy creation earlier today on 4/18.  IV antibiotics per general surgery  Type 2 diabetes-ordering correctional insulin, hypoglycemia protocol  Coronary artery disease, with  DAWSON in situ resume aspirin Plavix  when okay with surgery, Lipitor, Ranexa  History of A-fib currently sinus rhythm, rate  Hypertension-metoprolol and Lasix with close blood pressure monitoring.  Antihypertensives held due to low blood pressure readings.  Midodrine as needed.  Concern for sleep apnea, never diagnosed with patient has been desaturating while asleep in PACU-will need to monitor saturation closely, avoid opioids.  Postoperative acute blood loss anemia hemoglobin 5.9 on POD #1 -2 units PRBC transfused  Significant cough-no pneumonia on chest x-ray-Mucinex ordered and helping  PT OT, stoma care.    DVT prophylaxis-per operating surgeon            4/21  Blood pressure has been stable, hemoglobin stable  Discontinue midodrine  Continue to hold Lasix and metoprolol.  Blood sugars 1 episode borderline low-65 yesterday, however patient is eating better today we will continue to monitor.  IV fluids were turned off yesterday.  Lung crackles have improved still has mild crackles on the right, left side has cleared up.  PT OT, wound care nursing ordered.    4/22: Continues to tolerate diet.  Continues to require oxygen we will try to wean

## 2025-04-22 NOTE — DISCHARGE INSTR - COC
Continuity of Care Form    Patient Name: Komal Crawford   :  1940  MRN:  [id number]    Admit date:  2025  Discharge date:  2025    Code Status Order: Full Code   Advance Directives:    Date/Time Healthcare Directive Type of Healthcare Directive Copy in Chart Healthcare Agent Appointed Healthcare Agent's Name Healthcare Agent's Phone Number    [phone] Yes, patient has an advance directive for healthcare treatment  Health care treatment directive;Living will;Durable power of  for health care  No, copy requested from family  --  --  --             Admitting Physician:  Sherrie Lawler MD  PCP: Sabiha Bedolla MD    Discharging Nurse: LETY Negron  Discharging Hospital Unit/Room#: 309/309-01  Discharging Unit Phone Number: [phone]    Emergency Contact:   Extended Emergency Contact Information  Primary Emergency Contact: Ritchie Crawford  Address: [address] [place] [address]            [address]  Home Phone: [phone]  Work Phone: [phone]  Mobile Phone: [phone]  Relation: Spouse  Secondary Emergency Contact: Ramila Corcoran  Home Phone: [phone]  Relation: Friend    Past Surgical History:  Past Surgical History:   Procedure Laterality Date    APPENDECTOMY      BACK SURGERY      benign tumor    BLADDER SURGERY      bladder stimulator    BLADDER SUSPENSION      multiple    CARDIAC CATHETERIZATION      2 stents in RCA    CARDIOVASCULAR STRESS TEST  2014    CATARACT REMOVAL WITH IMPLANT Left 2017    [facility]    CATARACT REMOVAL WITH IMPLANT Right 2017    [facility]    COLON SURGERY      colostomy reversal    COLONOSCOPY  2016    tubular adenoma x3; internal hemorrhoids    COLONOSCOPY N/A 2019    COLONOSCOPY DIAGNOSTIC performed by Eli Marinelli MD at [facility]    COLONOSCOPY  2019    COLOSTOMY  1986    bowel obstruction/ rupture from diverticular disease, reversal 3 mos later

## 2025-04-22 NOTE — CARE COORDINATION
[facility] Quality Flow/Interdisciplinary Rounds Progress Note    Quality Flow Rounds held on April 22, 2025 at 0930    Disciplines Attending:  Bedside Nurse, , and Nursing Unit Leadership    Barriers to Discharge: clinical status     Anticipated Discharge Date:   tomorrow 4/23/25     Anticipated Discharge Disposition: Home with [facility] Kalpesh     [facility] RISK OF UNPLANNED READMISSION 2.0             17.5 Total Score        Discussed patient goal for the day, patient clinical progression, and barriers to discharge.  Wound eval completed for colostomy and f/u today. [facility] with Kalpesh.       Sterling Amos  April 22, 2025

## 2025-04-22 NOTE — PROGRESS NOTES
[facility]  New Ostomy Progress Note      NAME:  Komal Crawford  MEDICAL RECORD NUMBER:  [id number]  AGE: 84 y.o.   GENDER:  female  :  1940  TODAY'S DATE:  2025    Subjective      [facility] nurse visit this day for continues ostomy care. Patient reports appliance change needed overnight. States that she is feeling comfortable with appliance change.          Objective    /66   Pulse 74   Temp 97.9 °F (36.6 °C) (Oral)   Resp 18   Ht 1.448 m (4' 9.01\")   Wt 63 kg (139 lb)   SpO2 100%   BMI 30.07 kg/m²     Donavon Risk Score Donavon Scale Score: 17    Patient Active Problem List   Diagnosis Code    TIA (transient ischemic attack) G45.9    Chronic back pain M54.9, G89.29    Diabetic peripheral neuropathy ([facility]) E11.42    Macular degeneration of left eye H35.30    Constipation K59.00    Thrombocytopenia D69.6    B12 deficiency E53.8    Vitamin D deficiency E55.9    Anemia D64.9    DDD (degenerative disc disease), cervical M50.30    Age-related cognitive decline R41.81    Acquired cyst of kidney N28.1    Microscopic hematuria R31.29    Bilateral renal cysts N28.1    Essential hypertension I10    Gastroesophageal reflux disease without esophagitis K21.9    Mixed incontinence N39.46    Infection of prosthetic left knee joint T84.54XA    Recurrent UTI N39.0    Pure hypercholesterolemia E78.00    Hiatal hernia K44.9    Diabetic gastroparesis ([facility]) E11.43, K31.84    Internal hemorrhoid K64.8    Tubular adenoma D36.9    Colon polyps K63.5    Urge incontinence N39.41    S/P lumbar fusion Z98.1    Chronic ITP (idiopathic thrombocytopenia) ([facility]) D69.3    Urinary retention R33.9    Unsteadiness R26.81    Anxiety F41.9    Arthritis M19.90    Nausea R11.0    Diarrhea R19.7    Extrarenal pelvis [id number]    Primary osteoarthritis of left knee M17.12    Type 2 diabetes mellitus with diabetic peripheral angiopathy without gangrene, without long-term current use of insulin ([facility]) E11.51    Memory loss

## 2025-04-22 NOTE — PROGRESS NOTES
Spiritual Health History and Assessment/Progress Note  [facility]    (P) Follow-up, Emotional distress, (P) Life Adjustments, Adjustment to illness,      Name: Komal Crawford MRN: [id number]    Age: 84 y.o.     Sex: female   Language: English   Ep[employer]: Church   Incontinence in female     Date: 4/21/2025            Total Time Calculated: (P) 20 min              Spiritual Assessment continued in [address]        Referral/Consult From: (P) Rounding   Encounter Overview/Reason: (P) Follow-up  Service Provided For: (P) Patient       visited Pt. During rounding. Pt. Was open to conversation and welcoming. No family was present during visit.  provided support and pastoral care and support. Pt. Expressed appreciation for visit. We had a great visit and talked about family, kinsey and Nguyễn's resurrection and forgiveness. Empathic listening was offered and encouragement.    Kinsey, Belief, Meaning:   Patient has beliefs or practices that help with coping during difficult times  Family/Friends No family/friends present      Importance and Influence:  Patient has spiritual/personal beliefs that influence decisions regarding their health  Family/Friends No family/friends present    Community:  Patient feels well-supported. Support system includes: Spouse/Partner  Family/Friends No family/friends present    Assessment and Plan of Care:     Patient Interventions include: Facilitated expression of thoughts and feelings and Explored spiritual coping/struggle/distress  Family/Friends Interventions include: No family/friends present    Patient Plan of Care: Spiritual Care available upon further referral  Family/Friends Plan of Care: No family/friends present    Electronically signed by Chaplain OMID on 4/21/2025 at 8:28 PM    04/21/25 2026   Encounter Summary   Encounter Overview/Reason Follow-up   Service Provided For Patient   Referral/Consult From [facility]   Support System Spouse    Last Encounter  04/21/25   Complexity of Encounter Moderate   Begin Time 1730   End Time  1750   Total Time Calculated 20 min   Spiritual/Emotional needs   Type Spiritual Support   Grief, Loss, and Adjustments   Type Life Adjustments;Adjustment to illness   Assessment/Intervention/Outcome   Assessment Calm;Coping   Intervention Prayer (assurance of)/Randle;Sustaining Presence/Ministry of presence   Outcome Comfort;Coping;Encouraged;Expressed feelings, needs, and concerns;Expressed Gratitude   Plan and Referrals   Plan/Referrals Continue to visit, (comment)

## 2025-04-22 NOTE — PROGRESS NOTES
Occupational Therapy  Occupational Therapy Daily Treatment Note  Facility/Department: [address]   Patient Name: Komal Crawford        MRN: [id number]    : 1940    Date of Service: 2025    No chief complaint on file.    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[facility]), Body mass index 31.0-31.9, adult, Bronchitis, Caffeine use, Cataract extraction status of eye, left, Cataract extraction status of right eye, Cellulitis of left lower limb, Cerebral artery occlusion with cerebral infarction ([facility]), Chronic anticoagulation, Chronic back pain, Chronic ITP (idiopathic thrombocytopenia) ([facility]), Constipation, unspecified, CVA (cerebral infarction), Cyst of kidney, acquired, Deficiency of other specified B group vitamins, Diabetic gastroparesis ([facility]), Diaphragmatic hernia without obstruction or gangrene, Diverticular disease, Diverticulosis of intestine without perforation or abscess without bleeding, Dorsalgia, unspecified, Essential tremor, Exudative age-related macular degeneration, left eye, with active choroidal neovascularization ([facility]), Full dentures, Gastroesophageal reflux disease without esophagitis, GERD (gastroesophageal reflux disease), Hiatal hernia, Hip fracture ([facility]), History of blood transfusion, History of decreased platelet count, Hyperlipemia, Hypertension, Immune thrombocytopenic purpura ([facility]), Internal hemorrhoid, Localized edema, Long term (current) use of anticoagulants, Long term (current) use of oral hypoglycemic drugs, Long term current use of aspirin, Long term current use of insulin ([facility]), Macular

## 2025-04-22 NOTE — DISCHARGE INSTRUCTIONS
New Ostomy Discharge Instructions    Ostomy Assessment:  Location: [facility]  Type: colostomy  Color: red  Output: brown, loose  Creases/Folds:yes  Stents/Bridges: yes, rec rubber    Ostomy Supplies Used:   Coloplast   #[id number]     Brava Skin Barrier Wipe   #[id number]     Brava Protective Seal 4.2mm thick   #04558     SenSura [place] Flex Flat Skin Barrier 2-pc. (yellow)  #[id number]     SenSura [place] Flex EasiClose Wide Drainable Pouch 2-pc. with filter (yellow    Ostomy Discharge Needs:    -Ostomy education provided to patient.     -Home care nursing should support ostomy independence.     -Patient enrolled in Coloplast Care program prior to hospital discharge (sample kit to arrive at patient's home).    -Patient was provided with a small amount of samples to transition home with. Home care will need to order supplies as soon as possible.    -Patient will need set up with ostomy supply company prior to home care discharge.    -For ostomy/pouching concerns and questions, please call [facility] at [phone].    Routine Ostomy Care:  -Cleanse site with water only. Do not use any soaps, wipes, or lotions. Pat dry.  -Cut barrier/pouch to fit stoma with no more than 1/8\" of exposed skin.  -Apply skin barrier wipe to skin around stoma.  -Place a protective seal or barrier ring immediately around the stoma and apply pouch on top. Knead adhesives together with fingertips to help them adhere to the skin.    -Try to change pouch first thing in the morning (the stoma will be less active before eating or drinking).  -Empty the pouch when 1/3 to 1/2 full.  -Change the pouching system twice weekly or every 3-5 days.  -Our goal is to keep the skin around the stoma intact and to have a dependable pouching system without leaks.  -The skin around the stoma should be intact and look just like the skin on the opposite side of the abdomen. It should not be red or broken. For problems/questions, please call [facility] at [phone].  -The stoma is expected to shrink in size as swelling reduces- up to 8 weeks post-op. Measure stoma each time pouch is replaced during this timeframe and cut pouch to fit.  -If the pouch is leaking or needs to be changed during a time the stoma is very active, eating a piece of white bread or a few marshmallows may slow output for a short period of time

## 2025-04-23 LAB
BACTERIA URNS QL MICRO: ABNORMAL
BILIRUB UR QL STRIP: NEGATIVE
CHARACTER UR: ABNORMAL
CLARITY UR: CLEAR
COLOR UR: YELLOW
EPI CELLS #/AREA URNS HPF: ABNORMAL /HPF (ref 0–5)
GLUCOSE BLD-MCNC: 165 MG/DL (ref 65–105)
GLUCOSE BLD-MCNC: 173 MG/DL (ref 65–105)
GLUCOSE BLD-MCNC: 179 MG/DL (ref 65–105)
GLUCOSE BLD-MCNC: 215 MG/DL (ref 65–105)
GLUCOSE UR STRIP-MCNC: NEGATIVE MG/DL
HGB UR QL STRIP.AUTO: NEGATIVE
KETONES UR STRIP-MCNC: NEGATIVE MG/DL
LEUKOCYTE ESTERASE UR QL STRIP: ABNORMAL
NITRITE UR QL STRIP: POSITIVE
PH UR STRIP: 5.5 [PH] (ref 5–8)
PROT UR STRIP-MCNC: NEGATIVE MG/DL
RBC #/AREA URNS HPF: ABNORMAL /HPF (ref 0–2)
SP GR UR STRIP: 1.02 (ref 1–1.03)
UROBILINOGEN UR STRIP-ACNC: NORMAL EU/DL (ref 0–1)
WBC #/AREA URNS HPF: ABNORMAL /HPF (ref 0–5)
YEAST URNS QL MICRO: ABNORMAL

## 2025-04-23 PROCEDURE — 6370000000 HC RX 637 (ALT 250 FOR IP): Performed by: SURGERY

## 2025-04-23 PROCEDURE — 87086 URINE CULTURE/COLONY COUNT: CPT

## 2025-04-23 PROCEDURE — 87077 CULTURE AEROBIC IDENTIFY: CPT

## 2025-04-23 PROCEDURE — 6360000002 HC RX W HCPCS: Performed by: SURGERY

## 2025-04-23 PROCEDURE — 81001 URINALYSIS AUTO W/SCOPE: CPT

## 2025-04-23 PROCEDURE — 82947 ASSAY GLUCOSE BLOOD QUANT: CPT

## 2025-04-23 PROCEDURE — 87186 SC STD MICRODIL/AGAR DIL: CPT

## 2025-04-23 PROCEDURE — 6370000000 HC RX 637 (ALT 250 FOR IP): Performed by: INTERNAL MEDICINE

## 2025-04-23 PROCEDURE — 99232 SBSQ HOSP IP/OBS MODERATE 35: CPT | Performed by: HOSPITALIST

## 2025-04-23 PROCEDURE — 87184 SC STD DISK METHOD PER PLATE: CPT

## 2025-04-23 PROCEDURE — 87181 SC STD AGAR DILUTION PER AGT: CPT

## 2025-04-23 PROCEDURE — 1200000000 HC SEMI PRIVATE

## 2025-04-23 RX ADMIN — RANOLAZINE 500 MG: 500 TABLET, EXTENDED RELEASE ORAL at 11:40

## 2025-04-23 RX ADMIN — SENNOSIDES 8.6 MG: 8.6 TABLET, COATED ORAL at 11:40

## 2025-04-23 RX ADMIN — DOXYCYCLINE HYCLATE 100 MG: 100 TABLET, COATED ORAL at 11:40

## 2025-04-23 RX ADMIN — SENNOSIDES 8.6 MG: 8.6 TABLET, COATED ORAL at 19:50

## 2025-04-23 RX ADMIN — GUAIFENESIN 1200 MG: 600 TABLET, EXTENDED RELEASE ORAL at 19:49

## 2025-04-23 RX ADMIN — TROSPIUM CHLORIDE 20 MG: 20 TABLET, FILM COATED ORAL at 05:35

## 2025-04-23 RX ADMIN — INSULIN LISPRO 1 UNITS: 100 INJECTION, SOLUTION INTRAVENOUS; SUBCUTANEOUS at 20:03

## 2025-04-23 RX ADMIN — PANTOPRAZOLE SODIUM 40 MG: 40 TABLET, DELAYED RELEASE ORAL at 05:35

## 2025-04-23 RX ADMIN — ENOXAPARIN SODIUM 40 MG: 100 INJECTION SUBCUTANEOUS at 09:20

## 2025-04-23 RX ADMIN — TIZANIDINE 2 MG: 4 TABLET ORAL at 19:50

## 2025-04-23 RX ADMIN — GABAPENTIN 100 MG: 100 CAPSULE ORAL at 11:40

## 2025-04-23 RX ADMIN — RANOLAZINE 500 MG: 500 TABLET, EXTENDED RELEASE ORAL at 19:49

## 2025-04-23 RX ADMIN — Medication 1 TABLET: at 19:50

## 2025-04-23 RX ADMIN — DOXYCYCLINE HYCLATE 100 MG: 100 TABLET, COATED ORAL at 19:49

## 2025-04-23 RX ADMIN — GUAIFENESIN 1200 MG: 600 TABLET, EXTENDED RELEASE ORAL at 11:40

## 2025-04-23 RX ADMIN — GABAPENTIN 100 MG: 100 CAPSULE ORAL at 19:49

## 2025-04-23 NOTE — PROGRESS NOTES
[facility]  Progress Note      NAME:  Komal Crawford  MEDICAL RECORD NUMBER:  [id number]  AGE: 84 y.o.   GENDER: female  : 1940  TODAY'S DATE:  2025        [facility] nurse visit this am for continued new colostomy education. POD day #5. Patient sitting in chair, c/o diffculty eating/swallowing reporting that she can't even consume \"a bite of grits\". Small amount of pink/brown liquid appreciated in appliance. Pouch applied yesterday remains intact without evidence of leaks. Patient reports that she is planning to discharge to SNF. Case discussed with nursing. Has pouch at bedside if change is needed. Provided small supply of pouches to bridge to next level of care and enrolled in [employer] Cares support program.        .  Colostomy LUQ (Active)   Stomal Appliance 2 piece 25   Stoma  Assessment Protrudes;Pink;Moist;Edema 25   Peristomal Assessment Clean, dry & intact 25   Mucocutaneous Junction Intact 25   Treatment Site care;Liquid skin barrier;Barrier ring;Pouch change 25 0857   Stool Appearance Watery;Bloody 25   Stool Color Red;Brown 25   Stool Amount Small 25   Output (mL) 50 ml 25 0857   Number of days: 4              Intake/Output Summary (Last 24 hours) at 2025 0911  Last data filed at 2025 0536  Gross per 24 hour   Intake 560 ml   Output 1300 ml   Net -740 ml         For concerns, the [facility] nursing team can be reached via [employer] by searching \"wound ostomy\" under groups and choosing the [facility] option Monday-Friday 8am-4pm.     Kirsten Holloway MSN, RN  [facility]  Wound and Ostomy Service  [facility]  [phone]

## 2025-04-23 NOTE — PLAN OF CARE
Problem: Safety - Adult  Goal: Free from fall injury  4/23/2025 1240 by Jennifer Guerrero RN  Outcome: Progressing     Problem: Pain  Goal: Verbalizes/displays adequate comfort level or baseline comfort level  4/23/2025 1240 by Jennifer Guerrero RN  Outcome: Progressing     Problem: Chronic Conditions and Co-morbidities  Goal: Patient's chronic conditions and co-morbidity symptoms are monitored and maintained or improved  4/23/2025 1240 by Jennifer Guerrero RN  Outcome: Progressing     Problem: Discharge Planning  Goal: Discharge to home or other facility with appropriate resources  4/23/2025 1240 by Jennifer Guerrero RN  Outcome: Progressing     Problem: Skin/Tissue Integrity  Goal: Skin integrity remains intact  Description: 1.  Monitor for areas of redness and/or skin breakdown2.  Assess vascular access sites hourly3.  Every 4-6 hours minimum:  Change oxygen saturation probe site4.  Every 4-6 hours:  If on nasal continuous positive airway pressure, respiratory therapy assess nares and determine need for appliance change or resting period  4/23/2025 1240 by Jennifer Guerrero RN  Outcome: Progressing     Problem: Nutrition Deficit:  Goal: Optimize nutritional status  4/23/2025 1240 by Jennifer Guerrero RN  Outcome: Progressing

## 2025-04-23 NOTE — CARE COORDINATION
[facility] Quality Flow/Interdisciplinary Rounds Progress Note    Quality Flow Rounds held on April 23, 2025 at 0930    Disciplines Attending:  Bedside Nurse, , and Nursing Unit Leadership    Barriers to Discharge: clinical status     Anticipated Discharge Date: 4/24/25      Anticipated Discharge Disposition:    [facility] RISK OF UNPLANNED READMISSION 2.0             17.5 Total Score        Discussed patient goal for the day, patient clinical progression, and barriers to discharge.  Patient with minimal output from ostomy per nurse, General surgery to follow up today. CM discussed SNF rec and patient selected [facility], referral sent and Komal from [facility] they are able to accept at [place]. No precert needed.       Sterling Amos  April 23, 2025

## 2025-04-23 NOTE — PROGRESS NOTES
I   [facility]  Office: [phone]  Gordo Jimenez DO, Dejuan Hammer DO, Silver Jimenez DO, Eleazar Knox DO, July Patricia MD, Milena Forbes MD, Alis Head MD, Greer Womack MD,  Nakul Marie MD, Sravan Anthony MD, Louis Vaz MD,  Ceferino Jenkins DO, Mindi Claudio MD, Gopal Perry MD, Brandan Jimenez DO, Samantha Pinon MD,  Beltran Glynn DO, Rosa Alicea MD, Gabriela Rosales MD, Benson Raman MD,  Dami Trent MD, Roger Oconnell MD, Jos Lopez MD, Jodie Abraham MD, Cristino Melendrez MD, Marcos Weston MD, Torsten Duong DO, Heydi Maya MD, Ayaan Del Angel MD, Ceferino Castillo MD, Mohsin Reza, MD, Alvin Barbour MD, Shirley Waterhouse, CNP,  Dominga Devlin, CNP, Torsten Mcclendon, CNP,  Ivory Sellers, DNP, Monica Ahumada, CNP, Areli Grant, CNP, Lisha Guzman, CNP, Ladi Fraser, CNP, Divya Marino, PA-C, Chrissy Ramirez, CNP, Fermín Delgado, CNP,  Cindy Kennedy, CNP, Yessica Fung, CNP, Warner Puente, PA-C, Caitlyn Nettles, CNP,  Katheryn Krueger, CNS, Caroline Briseno, CNP, Brittany Arthur, CNP,   Yadira Padron, CNP              [facility]   IN-PATIENT SERVICE   [facility]           CONSULTATION NOTE            Date:   4/23/2025  Patient name:  Komal Crawford  Date of admission:  4/18/2025 10:07 AM  MRN:   [id number]  Account:  [id number]  YOB: 1940  PCP:    Sabiha Bedolla MD  Room:   [address] [place] [address]  Code Status:    Full Code    Physician Requesting Consult: Sherrie Lawler MD    Reason for Consult:  Medical management      Subjective:     84-year-old female with type 2 diabetes, Parkinson's disease, ITP, persistent A-fib, chronic infection of left knee prosthesis follows with ID.  Has history of sigmoid resection colostomy and takedown for prior diverticular disease.  Past several years she has suffered with rectal leakage rectal prolapse pelvic floor weakness and colostomy planned due to recurrent bowel issues and recurrent buttock

## 2025-04-23 NOTE — CARE COORDINATION
Post Acute Facility/Agency List     Provided patient with the following list, the list includes the overall star ratings obtained from CMS per the Medicare Web site (www.Medicare.gov):     [] Long Term Acute Care Facilities  [] Acute Inpatient Rehabilitation Facilities  [x] Skilled Nursing Facilities  [] Hospice Facilities  [] Home Care    Provided verbal instructions on how to utilize the QR Code to obtain additional detailed star ratings from www.Medicare.gov

## 2025-04-23 NOTE — PROGRESS NOTES
Patient has stated yesterday around she was feeling somewhat full and having difficulties getting her food down.  However went down fine but then again this morning after breakfast she was having the same symptoms and had just a mild for 2 of her scrambled eggs and cereal.  Patient having liquid stool via her stoma    Vital signs stable except for very minimal elevation of systolic blood pressure.  Patient is afebrile.  Abdomen is rounded soft nondistended not tympanitic.  Stoma pink viable and producing some liquid green stool.    Labs including electrolytes and white count yesterday were normal.  Patient may be having mild degree of ileus.  Will go back to full liquid diet and see how she does with this.  Try advancing again tomorrow.  Will order labs for tomorrow  Social service looking for rehab placement.

## 2025-04-23 NOTE — PLAN OF CARE
Problem: Safety - Adult  Goal: Free from fall injury  Outcome: Progressing     Problem: Pain  Goal: Verbalizes/displays adequate comfort level or baseline comfort level  Outcome: Progressing     Problem: Chronic Conditions and Co-morbidities  Goal: Patient's chronic conditions and co-morbidity symptoms are monitored and maintained or improved  Outcome: Progressing     Problem: Discharge Planning  Goal: Discharge to home or other facility with appropriate resources  Outcome: Progressing     Problem: Skin/Tissue Integrity  Goal: Skin integrity remains intact  Description: 1.  Monitor for areas of redness and/or skin breakdown2.  Assess vascular access sites hourly3.  Every 4-6 hours minimum:  Change oxygen saturation probe site4.  Every 4-6 hours:  If on nasal continuous positive airway pressure, respiratory therapy assess nares and determine need for appliance change or resting period  Outcome: Progressing     Problem: Nutrition Deficit:  Goal: Optimize nutritional status  Outcome: Progressing

## 2025-04-24 LAB
ANION GAP SERPL CALCULATED.3IONS-SCNC: 10 MMOL/L (ref 9–17)
BUN SERPL-MCNC: 16 MG/DL (ref 8–23)
CALCIUM SERPL-MCNC: 8.4 MG/DL (ref 8.6–10.4)
CHLORIDE SERPL-SCNC: 106 MMOL/L (ref 98–107)
CO2 SERPL-SCNC: 22 MMOL/L (ref 20–31)
CREAT SERPL-MCNC: 0.7 MG/DL (ref 0.5–0.9)
ERYTHROCYTE [DISTWIDTH] IN BLOOD BY AUTOMATED COUNT: 15.7 % (ref 12.5–15.4)
GFR, ESTIMATED: 86 ML/MIN/1.73M2
GLUCOSE BLD-MCNC: 163 MG/DL (ref 65–105)
GLUCOSE BLD-MCNC: 187 MG/DL (ref 65–105)
GLUCOSE BLD-MCNC: 253 MG/DL (ref 65–105)
GLUCOSE SERPL-MCNC: 159 MG/DL (ref 70–99)
HCT VFR BLD AUTO: 30.6 % (ref 36–46)
HGB BLD-MCNC: 10 G/DL (ref 12–16)
MCH RBC QN AUTO: 29.1 PG (ref 26–34)
MCHC RBC AUTO-ENTMCNC: 32.6 G/DL (ref 31–37)
MCV RBC AUTO: 89.1 FL (ref 80–100)
PLATELET # BLD AUTO: 138 K/UL (ref 140–450)
PMV BLD AUTO: 11.5 FL (ref 6–12)
POTASSIUM SERPL-SCNC: 3.8 MMOL/L (ref 3.7–5.3)
RBC # BLD AUTO: 3.43 M/UL (ref 4–5.2)
SODIUM SERPL-SCNC: 138 MMOL/L (ref 135–144)
WBC OTHER # BLD: 8.1 K/UL (ref 3.5–11)

## 2025-04-24 PROCEDURE — 97110 THERAPEUTIC EXERCISES: CPT

## 2025-04-24 PROCEDURE — 85027 COMPLETE CBC AUTOMATED: CPT

## 2025-04-24 PROCEDURE — 6370000000 HC RX 637 (ALT 250 FOR IP): Performed by: INTERNAL MEDICINE

## 2025-04-24 PROCEDURE — 99231 SBSQ HOSP IP/OBS SF/LOW 25: CPT | Performed by: HOSPITALIST

## 2025-04-24 PROCEDURE — 6360000002 HC RX W HCPCS: Performed by: SURGERY

## 2025-04-24 PROCEDURE — 1200000000 HC SEMI PRIVATE

## 2025-04-24 PROCEDURE — 80048 BASIC METABOLIC PNL TOTAL CA: CPT

## 2025-04-24 PROCEDURE — 6370000000 HC RX 637 (ALT 250 FOR IP): Performed by: SURGERY

## 2025-04-24 PROCEDURE — 97116 GAIT TRAINING THERAPY: CPT

## 2025-04-24 PROCEDURE — 36415 COLL VENOUS BLD VENIPUNCTURE: CPT

## 2025-04-24 PROCEDURE — 82947 ASSAY GLUCOSE BLOOD QUANT: CPT

## 2025-04-24 RX ADMIN — GUAIFENESIN 1200 MG: 600 TABLET, EXTENDED RELEASE ORAL at 10:36

## 2025-04-24 RX ADMIN — TROSPIUM CHLORIDE 20 MG: 20 TABLET, FILM COATED ORAL at 16:33

## 2025-04-24 RX ADMIN — GABAPENTIN 100 MG: 100 CAPSULE ORAL at 10:36

## 2025-04-24 RX ADMIN — DOXYCYCLINE HYCLATE 100 MG: 100 TABLET, COATED ORAL at 10:37

## 2025-04-24 RX ADMIN — DOXYCYCLINE HYCLATE 100 MG: 100 TABLET, COATED ORAL at 22:02

## 2025-04-24 RX ADMIN — TROSPIUM CHLORIDE 20 MG: 20 TABLET, FILM COATED ORAL at 06:17

## 2025-04-24 RX ADMIN — ENOXAPARIN SODIUM 40 MG: 100 INJECTION SUBCUTANEOUS at 10:36

## 2025-04-24 RX ADMIN — TIZANIDINE 2 MG: 4 TABLET ORAL at 22:03

## 2025-04-24 RX ADMIN — SENNOSIDES 8.6 MG: 8.6 TABLET, COATED ORAL at 22:02

## 2025-04-24 RX ADMIN — CLINDAMYCIN HYDROCHLORIDE 150 MG: 150 CAPSULE ORAL at 16:33

## 2025-04-24 RX ADMIN — PANTOPRAZOLE SODIUM 40 MG: 40 TABLET, DELAYED RELEASE ORAL at 06:17

## 2025-04-24 RX ADMIN — GABAPENTIN 100 MG: 100 CAPSULE ORAL at 22:02

## 2025-04-24 RX ADMIN — GUAIFENESIN 1200 MG: 600 TABLET, EXTENDED RELEASE ORAL at 22:02

## 2025-04-24 RX ADMIN — RANOLAZINE 500 MG: 500 TABLET, EXTENDED RELEASE ORAL at 10:36

## 2025-04-24 RX ADMIN — RANOLAZINE 500 MG: 500 TABLET, EXTENDED RELEASE ORAL at 22:02

## 2025-04-24 RX ADMIN — INSULIN LISPRO 2 UNITS: 100 INJECTION, SOLUTION INTRAVENOUS; SUBCUTANEOUS at 12:44

## 2025-04-24 RX ADMIN — SENNOSIDES 8.6 MG: 8.6 TABLET, COATED ORAL at 10:36

## 2025-04-24 NOTE — PROGRESS NOTES
Physical Therapy  Facility/Department: [address]   Physical Therapy Daily Treatment Note    Patient Name: Komal Crawford        MRN: [id number]    : 1940    Date of Service: 2025    No chief complaint on file.    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[facility]), Body mass index 31.0-31.9, adult, Bronchitis, Caffeine use, Cataract extraction status of eye, left, Cataract extraction status of right eye, Cellulitis of left lower limb, Cerebral artery occlusion with cerebral infarction ([facility]), Chronic anticoagulation, Chronic back pain, Chronic ITP (idiopathic thrombocytopenia) ([facility]), Constipation, unspecified, CVA (cerebral infarction), Cyst of kidney, acquired, Deficiency of other specified B group vitamins, Diabetic gastroparesis ([facility]), Diaphragmatic hernia without obstruction or gangrene, Diverticular disease, Diverticulosis of intestine without perforation or abscess without bleeding, Dorsalgia, unspecified, Essential tremor, Exudative age-related macular degeneration, left eye, with active choroidal neovascularization ([facility]), Full dentures, Gastroesophageal reflux disease without esophagitis, GERD (gastroesophageal reflux disease), Hiatal hernia, Hip fracture ([facility]), History of blood transfusion, History of decreased platelet count, Hyperlipemia, Hypertension, Immune thrombocytopenic purpura ([facility]), Internal hemorrhoid, Localized edema, Long term (current) use of anticoagulants, Long term (current) use of oral hypoglycemic drugs, Long term current use of aspirin, Long term current use of insulin ([facility]), Macular degeneration,  infection), Vitamin D deficiency, unspecified, Weakness, Wears dentures, and Wears glasses.  Past Surgical History:  has a past surgical history that includes bladder suspension (2002); Hysterectomy (1969); Tonsillectomy (1978); Appendectomy (1962); Spine surgery (2010); colostomy (1986); Revision Colostomy (1986); Spine surgery (1990); Bladder surgery; Upper gastrointestinal endoscopy (05/05/2014); cardiovascular stress test (12/2014); Colon surgery; Total abdominal hysterectomy w/ bilateral salpingoophorectomy; fracture surgery (Right, 2011); fracture surgery (Left, 2001); fracture surgery (Left, 2013); Cardiac catheterization (2008); Revision total knee arthroplasty (Right, 10/27/2015); Colonoscopy (04/07/2016); Upper gastrointestinal endoscopy (04/07/2016); lumbar fusion (2017); Cystocopy (09/08/2017); Cataract removal with implant (Left, 11/07/2017); pr xcapsl ctrc rmvl insj io lens prosth w/o ecp (Left, 11/07/2017); Cataract removal with implant (Right, 11/28/2017); pr xcapsl ctrc rmvl insj io lens prosth w/o ecp (Right, 11/28/2017); Upper gastrointestinal endoscopy (N/A, 07/17/2018); joint replacement (Bilateral, 2000); hernia repair; Colonoscopy (N/A, 11/06/2019); Revision total knee arthroplasty (Left, 07/14/2020); Colonoscopy (11/06/2019); Coronary angioplasty with stent (08/04/2022); Total hip arthroplasty (Right, 11/06/2023); hip surgery (Right, 11/06/2023); Total hip arthroplasty (Left, 01/20/2024); Revision total knee arthroplasty (Left, 01/23/2024); knee surgery (Left, 03/08/2024); knee surgery (Left, 04/02/2024); Leg Muscle Surgery (Left, 04/02/2024); Cystoscopy (N/A, 09/06/2024); back surgery; and Small intestine surgery (N/A, 4/18/2025).    Discharge Recommendations  Discharge Recommendations: Patient would benefit from continued therapy after discharge  PT Equipment Recommendations  Equipment Needed: No  Other: Pt owns a RW, rollator, cane, power w/c and adjustable bed w/ R bedrail. Writer

## 2025-04-24 NOTE — CARE COORDINATION
[facility] Quality Flow/Interdisciplinary Rounds Progress Note    Quality Flow Rounds held on April 24, 2025 at 0930    Disciplines Attending:  Bedside Nurse, , and Nursing Unit Leadership    Barriers to Discharge: clinical status    Anticipated Discharge Date:  4/24/25     Anticipated Discharge Disposition: SNF    [facility] RISK OF UNPLANNED READMISSION 2.0             17.3 Total Score        Discussed patient goal for the day, patient clinical progression, and barriers to discharge.  RN reports plan to advance diet today with possible discharge this evening. Patient accepted to [facility] and is able to admit when discharged. Will call transportation request faxed to [facility].    4031- RN reports patient tolerating FLD. PS message sent to Gen Surg questioning if diet can be advanced and notifying that patient is able to admit to SNF today.

## 2025-04-24 NOTE — PROGRESS NOTES
Occupational Therapy  Occupational Therapy Daily Treatment Note  Facility/Department: [address]   Patient Name: Komal Crawford        MRN: [id number]    : 1940    Date of Service: 2025    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[facility]), Body mass index 31.0-31.9, adult, Bronchitis, Caffeine use, Cataract extraction status of eye, left, Cataract extraction status of right eye, Cellulitis of left lower limb, Cerebral artery occlusion with cerebral infarction (HCC), Chronic anticoagulation, Chronic back pain, Chronic ITP (idiopathic thrombocytopenia) ([facility]), Constipation, unspecified, CVA (cerebral infarction), Cyst of kidney, acquired, Deficiency of other specified B group vitamins, Diabetic gastroparesis ([facility]), Diaphragmatic hernia without obstruction or gangrene, Diverticular disease, Diverticulosis of intestine without perforation or abscess without bleeding, Dorsalgia, unspecified, Essential tremor, Exudative age-related macular degeneration, left eye, with active choroidal neovascularization ([facility]), Full dentures, Gastroesophageal reflux disease without esophagitis, GERD (gastroesophageal reflux disease), Hiatal hernia, Hip fracture ([facility]), History of blood transfusion, History of decreased platelet count, Hyperlipemia, Hypertension, Immune thrombocytopenic purpura ([facility]), Internal hemorrhoid, Localized edema, Long term (current) use of anticoagulants, Long term (current) use of oral hypoglycemic drugs, Long term current use of aspirin, Long term current use of insulin ([facility]), Macular degeneration, Macular degeneration of

## 2025-04-24 NOTE — PROGRESS NOTES
I   [facility]  Office: [phone]  Gordo Jimenez DO, Dejuan Hammer DO, Silver Jimenez DO, Eleazar Knox DO, July Patricia MD, Milena Forbes MD, Alis Head MD, Greer Womack MD,  Nakul Marie MD, Sravan Anthony MD, Louis Vaz MD,  Ceferino Jenkins DO, Mindi Claudio MD, Gopal Perry MD, Brandan Jimenez DO, Samantha Pinon MD,  Beltran Glynn DO, Rosa Alicea MD, Gabriela Rosales MD, Benson Raman MD,  Dami Trent MD, Roger Oconnell MD, Jos Lopez MD, Jodie Abraham MD, Cristino Melendrez MD, Marcos Weston MD, Torsten Duong DO, Heydi Maya MD, Ayaan Del Angel MD, Ceferino Castillo MD, Mohsin Reza, MD, Alvin Barbour MD, Shirley Waterhouse, CNP,  Dominga Devlin, CNP, Torsten Mcclendon, CNP,  Ivory Sellers, DNP, Monica Ahumada, CNP, Areli Grant, CNP, Lisha Guzman, CNP, Ladi Fraser, CNP, Divya Marino, PA-C, Chrissy Ramirez, CNP, Fermín Delgado, CNP,  Cindy Kennedy, CNP, Yessica Fung, CNP, Warner Puente, PA-C, Caitlyn Nettles, CNP,  Katheryn Krueger, CNS, Caroline Briseno, CNP, Brittany Arthur, CNP,   Yadira Padron, CNP              [facility]   IN-PATIENT SERVICE   [facility]           CONSULTATION NOTE            Date:   4/24/2025  Patient name:  Komal Crawford  Date of admission:  4/18/2025 10:07 AM  MRN:   [id number]  Account:  [id number]  YOB: 1940  PCP:    Sabiha Bedolla MD  Room:   [address] [place] [address]  Code Status:    Full Code    Physician Requesting Consult: Sherrie Lawler MD    Reason for Consult:  Medical management      Subjective:     84-year-old female with type 2 diabetes, Parkinson's disease, ITP, persistent A-fib, chronic infection of left knee prosthesis follows with ID.  Has history of sigmoid resection colostomy and takedown for prior diverticular disease.  Past several years she has suffered with rectal leakage rectal prolapse pelvic floor weakness and colostomy planned due to recurrent bowel issues and recurrent buttock  ankle-foot    Pure hypercholesterolemia, unspecified     Rectal prolapse     Rectal prolapse     w pelvic floor weakness    Resistance to multiple antibiotics     Retention of urine, unspecified     Ringing in ears     Self-catheterizes urinary bladder     6-7 times daily    Sepsis, unspecified organism (HCC)     TIA (transient ischemic attack) 2000    x1    Tinea corporis     Tinnitus, unspecified ear     Tubular adenoma     colon polyps    Type 2 diabetes mellitus with diabetic autonomic (poly)neuropathy (HCC)     Type 2 diabetes mellitus with diabetic peripheral angiopathy without gangrene ([facility])     Type II or unspecified type diabetes mellitus without mention of complication, not stated as uncontrolled     Under care of team     Neurologist- AT [facility]    Under care of team     Dr. Robbins- Endocrinology    Under care of team     cardiology- Dr. Peña    Unspecified open wound, left lower leg, subsequent encounter     Unsteadiness on feet     Urinary incontinence     frequent UTI s/p infection, surgical dilatation lead to incontinence    UTI (urinary tract infection)     Vitamin D deficiency, unspecified     Weakness     Wears dentures     full on top, partial on bottom    Wears glasses         Past Surgical History:     Past Surgical History:   Procedure Laterality Date    APPENDECTOMY  1962    BACK SURGERY      benign tumor    BLADDER SURGERY      bladder stimulator    BLADDER SUSPENSION  2002    multiple    CARDIAC CATHETERIZATION  2008    2 stents in RCA    CARDIOVASCULAR STRESS TEST  12/2014    CATARACT REMOVAL WITH IMPLANT Left 11/07/2017    [facility]    CATARACT REMOVAL WITH IMPLANT Right 11/28/2017    [facility]    COLON SURGERY      colostomy reversal    COLONOSCOPY  04/07/2016    tubular adenoma x3; internal hemorrhoids    COLONOSCOPY N/A 11/06/2019    COLONOSCOPY DIAGNOSTIC performed by Eli Marinelli MD at [facility]    COLONOSCOPY  11/06/2019    COLOSTOMY  1986    bowel

## 2025-04-24 NOTE — PROGRESS NOTES
Patient states feeling beeter, less full with diet and ready to try regular food again.    VVS  afebrile    Abdomen soft and not distended.  Midline incisions intact and no erythema.Stoma pink and smal amount of liquid stools.    Labs this AM pending.    If tolerates diet and once SS arrangement made for rehab, patient could be discharged shortly.  Would see in office in aobut a wkkd and will remove staples and stoma bridge at that time.

## 2025-04-25 ENCOUNTER — CARE COORDINATION (OUTPATIENT)
Dept: CARE COORDINATION | Age: 85
End: 2025-04-25

## 2025-04-25 VITALS
BODY MASS INDEX: 29.99 KG/M2 | TEMPERATURE: 98.1 F | OXYGEN SATURATION: 95 % | SYSTOLIC BLOOD PRESSURE: 141 MMHG | DIASTOLIC BLOOD PRESSURE: 65 MMHG | WEIGHT: 139 LBS | HEIGHT: 57 IN | HEART RATE: 77 BPM | RESPIRATION RATE: 18 BRPM

## 2025-04-25 PROBLEM — Z93.3 STATUS POST COLOSTOMY (HCC): Status: ACTIVE | Noted: 2025-04-25

## 2025-04-25 LAB
GLUCOSE BLD-MCNC: 151 MG/DL (ref 65–105)
GLUCOSE BLD-MCNC: 174 MG/DL (ref 65–105)
GLUCOSE BLD-MCNC: 225 MG/DL (ref 65–105)

## 2025-04-25 PROCEDURE — 99231 SBSQ HOSP IP/OBS SF/LOW 25: CPT | Performed by: HOSPITALIST

## 2025-04-25 PROCEDURE — 97535 SELF CARE MNGMENT TRAINING: CPT

## 2025-04-25 PROCEDURE — 82947 ASSAY GLUCOSE BLOOD QUANT: CPT

## 2025-04-25 PROCEDURE — 6360000002 HC RX W HCPCS: Performed by: SURGERY

## 2025-04-25 PROCEDURE — 6370000000 HC RX 637 (ALT 250 FOR IP): Performed by: INTERNAL MEDICINE

## 2025-04-25 PROCEDURE — 6370000000 HC RX 637 (ALT 250 FOR IP): Performed by: SURGERY

## 2025-04-25 RX ORDER — HYDROCODONE BITARTRATE AND ACETAMINOPHEN 5; 325 MG/1; MG/1
1 TABLET ORAL EVERY 4 HOURS PRN
Qty: 22 TABLET | Refills: 0 | Status: SHIPPED | OUTPATIENT
Start: 2025-04-25 | End: 2025-04-25

## 2025-04-25 RX ORDER — HYDROCODONE BITARTRATE AND ACETAMINOPHEN 5; 325 MG/1; MG/1
1 TABLET ORAL EVERY 4 HOURS PRN
Qty: 22 TABLET | Refills: 0 | Status: SHIPPED | OUTPATIENT
Start: 2025-04-25 | End: 2025-04-30

## 2025-04-25 RX ADMIN — SENNOSIDES 8.6 MG: 8.6 TABLET, COATED ORAL at 08:45

## 2025-04-25 RX ADMIN — DOXYCYCLINE HYCLATE 100 MG: 100 TABLET, COATED ORAL at 08:45

## 2025-04-25 RX ADMIN — TROSPIUM CHLORIDE 20 MG: 20 TABLET, FILM COATED ORAL at 16:00

## 2025-04-25 RX ADMIN — PANTOPRAZOLE SODIUM 40 MG: 40 TABLET, DELAYED RELEASE ORAL at 05:28

## 2025-04-25 RX ADMIN — GABAPENTIN 100 MG: 100 CAPSULE ORAL at 08:45

## 2025-04-25 RX ADMIN — ENOXAPARIN SODIUM 40 MG: 100 INJECTION SUBCUTANEOUS at 08:46

## 2025-04-25 RX ADMIN — Medication 1 TABLET: at 08:55

## 2025-04-25 RX ADMIN — RANOLAZINE 500 MG: 500 TABLET, EXTENDED RELEASE ORAL at 08:45

## 2025-04-25 RX ADMIN — ACETAMINOPHEN 650 MG: 325 TABLET ORAL at 05:28

## 2025-04-25 RX ADMIN — TROSPIUM CHLORIDE 20 MG: 20 TABLET, FILM COATED ORAL at 05:28

## 2025-04-25 RX ADMIN — ACETAMINOPHEN 650 MG: 325 TABLET ORAL at 00:39

## 2025-04-25 RX ADMIN — GUAIFENESIN 1200 MG: 600 TABLET, EXTENDED RELEASE ORAL at 08:45

## 2025-04-25 ASSESSMENT — PAIN DESCRIPTION - LOCATION: LOCATION: BACK

## 2025-04-25 ASSESSMENT — PAIN SCALES - GENERAL: PAINLEVEL_OUTOF10: 1

## 2025-04-25 NOTE — PLAN OF CARE
Problem: Safety - Adult  Goal: Free from fall injury  4/25/2025 1659 by Migdalia Reyez RN  Outcome: Adequate for Discharge  4/25/2025 1659 by Migdalia Reyez RN  Outcome: Adequate for Discharge  4/25/2025 1053 by Migdalia Reyez RN  Outcome: Progressing     Problem: Pain  Goal: Verbalizes/displays adequate comfort level or baseline comfort level  4/25/2025 1659 by Migdalia Reyez RN  Outcome: Adequate for Discharge  4/25/2025 1659 by Migdalia Reyez RN  Outcome: Adequate for Discharge  4/25/2025 1053 by Migdalia Reyez RN  Outcome: Progressing     Problem: Chronic Conditions and Co-morbidities  Goal: Patient's chronic conditions and co-morbidity symptoms are monitored and maintained or improved  4/25/2025 1659 by Migdalia Reyez RN  Outcome: Adequate for Discharge  4/25/2025 1659 by Migdalia Reyez RN  Outcome: Adequate for Discharge  4/25/2025 1053 by Migdalia Reyez RN  Outcome: Progressing     Problem: Discharge Planning  Goal: Discharge to home or other facility with appropriate resources  4/25/2025 1659 by Migdalia Reyez RN  Outcome: Adequate for Discharge  4/25/2025 1659 by Migdalia Reyez RN  Outcome: Adequate for Discharge  4/25/2025 1053 by Migdalia Reyez RN  Outcome: Progressing     Problem: Skin/Tissue Integrity  Goal: Skin integrity remains intact  Description: 1.  Monitor for areas of redness and/or skin breakdown2.  Assess vascular access sites hourly3.  Every 4-6 hours minimum:  Change oxygen saturation probe site4.  Every 4-6 hours:  If on nasal continuous positive airway pressure, respiratory therapy assess nares and determine need for appliance change or resting period  4/25/2025 1659 by Migdalia Reyez RN  Outcome: Adequate for Discharge  4/25/2025 1659 by Migdalia Reyez RN  Outcome: Adequate for Discharge  4/25/2025 1053 by Migdalia Reyez RN  Outcome: Progressing     Problem: Nutrition Deficit:  Goal: Optimize nutritional status  4/25/2025 1659 by Migdalia Reyez RN  Outcome: Adequate for Discharge  4/25/2025 1659 by

## 2025-04-25 NOTE — CARE COORDINATION
Updated [facility] on patient testing today.  They do not anticipate having a bed available over the week end for the patient if she discharges, check back for next week and keep them updated.

## 2025-04-25 NOTE — PROGRESS NOTES
I   [facility]  Office: [phone]  Gordo Jimenez DO, Dejuan Hammer DO, Silver Jimenez DO, Eleazar Knox DO, July Patricia MD, Milena Forbes MD, Alis Head MD, Greer Womack MD,  Nakul Marie MD, Sravan Anthony MD, Louis Vaz MD,  Ceferino Jenkins DO, Mindi Claudio MD, Gopla Perry MD, Brandan Jimenez DO, Samantha Pinon MD,  Beltran Glynn DO, Rosa Alicea MD, Gabriela Rosales MD, Benson Raman MD,  Dami Trent MD, Roger Oconnell MD, Jos Lopez MD, Jodie Abraham MD, Cristino Melendrez MD, Marcos Weston MD, Torsten Duong DO, Heydi Maya MD, Ayaan Del Angel MD, eCferino Castillo MD, Mohsin Reza, MD, Alvin Barbour MD, Shirley Waterhouse, CNP,  Dominga Devlin, CNP, Torsten Mcclendon, CNP,  Ivory Sellers, DNP, Monica Ahumada, CNP, Areli Grant, CNP, Lisha Guzman, CNP, Ladi Fraser, CNP, Divya Marino, PA-C, Chrissy Ramirze, CNP, Fermín Delgado, CNP,  Cindy Kennedy, CNP, Yessica Fung, CNP, Warner Puente, PA-C, Caitlyn Nettles, CNP,  Katheryn Krueger, CNS, Caroline Briseno, CNP, Brittany Arthur, CNP,   Yadira Padron, CNP              [facility]   IN-PATIENT SERVICE   [facility]           CONSULTATION NOTE            Date:   4/25/2025  Patient name:  Komal Crawford  Date of admission:  4/18/2025 10:07 AM  MRN:   [id number]  Account:  [id number]  YOB: 1940  PCP:    Sabiha Bedolla MD  Room:   [address] [place] [address]  Code Status:    Full Code    Physician Requesting Consult: Sherrie Lawler MD    Reason for Consult:  Medical management      Subjective:     84-year-old female with type 2 diabetes, Parkinson's disease, ITP, persistent A-fib, chronic infection of left knee prosthesis follows with ID.  Has history of sigmoid resection colostomy and takedown for prior diverticular disease.  Past several years she has suffered with rectal leakage rectal prolapse pelvic floor weakness and colostomy planned due to recurrent bowel issues and recurrent buttock

## 2025-04-25 NOTE — CARE COORDINATION
[facility] after [facility].  Prepared Transport request and sent.    1419-Called and informed patient's nurse, Migdalia, that patient will will be picked up at 1630 by [facility] today. Call report to [phone].    1620-Spoke with Komal at [facility] and they need a signed order for the [place] or the physician can call their pharmacy at [phone] to order. Isela served Dr. Argueta.    1630-Spoke with Dr. Barbour and he will write the presciption for the patient. [facility] packet prepared.

## 2025-04-25 NOTE — PROGRESS NOTES
Report called,  Taisha forwarded the phone to the nurse, only voice mail to leave a message, left a message to call back. The writer called back again to Taisha and she said the nurse will call back.

## 2025-04-25 NOTE — DISCHARGE SUMMARY
Discharge Summary    Date: 4/25/2025  Patient Name: Komal Crawford    YOB: 1940     Age: 84 y.o.    Admit Date: 4/18/2025  Discharge Date: 4/25/2025  Discharge Condition: Fair    Admission Diagnosis  Chronic constipation [K59.09];Incontinence of feces, unspecified fecal incontinence type [R15.9];Incontinence in female [R32]      Discharge Diagnosis  Principal Problem:    Incontinence in female  Active Problems:    Recurrent major depressive disorder, in remission    Diabetic peripheral neuropathy ([facility])    Macular degeneration of left eye    Thrombocytopenia    Essential hypertension    Gastroesophageal reflux disease without esophagitis    Pure hypercholesterolemia    Diabetic gastroparesis ([facility])    Chronic ITP (idiopathic thrombocytopenia) ([facility])    Type 2 diabetes mellitus with diabetic peripheral angiopathy without gangrene, without long-term current use of insulin ([facility])    Coronary angioplasty status    Presence of drug-eluting stent in right coronary artery    Longstanding persistent atrial fibrillation ([facility])    Parkinson's disease ([facility])    Suprapubic catheter ([facility])    Status post colostomy ([facility])  Resolved Problems:    * No resolved hospital problems. *      Hospital Stay  Narrative of Hospital Course:  Patient admitted to the hospital for creation of colostomy secondary to very poor ambulation difficulties with continence and previous history of decubiti.  She underwent laparoscopic converted to open loop colostomy in the left upper quadrant secondary to multiple adhesions from multiple previous surgeries.    Consultants:  IP CONSULT TO HOSPITALIST  IP CONSULT TO SOCIAL WORK    Surgeries/procedures Performed:  Laparoscopic converted to open loop colostomy creation     Treatments:    Surgery        Discharge Plan/Disposition:  Long-Term Acute Care    Hospital/Incidental Findings Requiring Follow Up:    Patient Instructions:    Diet: Regular Diet    Activity:Activity as Tolerated  For number of days  lungs    diclofenac sodium (VOLTAREN) 1 % GEL  Apply 4 g topically 2 times daily    Insulin Pen Needle ([employer] PEN NEEDLES 29G) 29G X 12MM MISC  1 each by Does not apply route daily  Qty: 100 each Refills: 3    Continuous Blood Gluc Sensor (FREESTYLE DAVIS 14 DAY SENSOR) MISC      CRANBERRY PO  Take by mouth 2 times daily          Current Discharge Medication List        Time Spent on Discharge:  10 minutes were spent in patient examination, evaluation, counseling as well as medication reconciliation, prescriptions for required medications, discharge plan, and follow up.    Electronically signed by Sherrie Lawler MD on 4/25/25 at 12:39 PM EDT

## 2025-04-25 NOTE — PROGRESS NOTES
Occupational Therapy  Occupational Therapy Daily Treatment Note  Facility/Department: [address]   Patient Name: Komal Crawford        MRN: [id number]    : 1940    Date of Service: 2025    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[facility]), Body mass index 31.0-31.9, adult, Bronchitis, Caffeine use, Cataract extraction status of eye, left, Cataract extraction status of right eye, Cellulitis of left lower limb, Cerebral artery occlusion with cerebral infarction (HCC), Chronic anticoagulation, Chronic back pain, Chronic ITP (idiopathic thrombocytopenia) ([facility]), Constipation, unspecified, CVA (cerebral infarction), Cyst of kidney, acquired, Deficiency of other specified B group vitamins, Diabetic gastroparesis ([facility]), Diaphragmatic hernia without obstruction or gangrene, Diverticular disease, Diverticulosis of intestine without perforation or abscess without bleeding, Dorsalgia, unspecified, Essential tremor, Exudative age-related macular degeneration, left eye, with active choroidal neovascularization ([facility]), Full dentures, Gastroesophageal reflux disease without esophagitis, GERD (gastroesophageal reflux disease), Hiatal hernia, Hip fracture ([facility]), History of blood transfusion, History of decreased platelet count, Hyperlipemia, Hypertension, Immune thrombocytopenic purpura ([facility]), Internal hemorrhoid, Localized edema, Long term (current) use of anticoagulants, Long term (current) use of oral hypoglycemic drugs, Long term current use of aspirin, Long term current use of insulin ([facility]), Macular degeneration, Macular degeneration of  endurance;Decreased balance;Decreased vision/visual deficit  Assessment: Pt presents to OT this date with above noted deficits. Pt is not currently functioning at [facility] but is making progress towards OT goals. At this time Pt does not demonstrate the functional ability to safely return to prior living arrangements. It is recommended that Pt recieve OT services during hospitalization and after discharge to increase safety/ADLs for safe return to previous environment.  Prognosis: Good  REQUIRES OT FOLLOW-UP: Yes  Activity Tolerance  Activity Tolerance: Patient Tolerated treatment well  Safety Devices  Type of Devices: All fall risk precautions in place;Call light within reach;Chair alarm in place;Gait belt;Patient at risk for falls;Left in chair;Nurse notified  Restraints  Restraints Initially in Place: No    AM-PAC  AM-PAC Daily Activity - Inpatient   How much help is needed for putting on and taking off regular lower body clothing?: A Lot  How much help is needed for bathing (which includes washing, rinsing, drying)?: A Little  How much help is needed for toileting (which includes using toilet, bedpan, or urinal)?: A Lot  How much help is needed for putting on and taking off regular upper body clothing?: A Little  How much help is needed for taking care of personal grooming?: A Little  How much help for eating meals?: None  AM-PAC Inpatient Daily Activity Raw Score: 17  AM-PAC Inpatient ADL T-Scale Score : 37.26  ADL Inpatient CMS 0-100% Score: 50.11  ADL Inpatient CMS G-Code Modifier : CK    Restrictions/Precautions  Restrictions/Precautions  Restrictions/Precautions: Fall Risk;Contact Precautions  Activity Level: Up as Tolerated  Required Braces or Orthoses?: Yes  Implants Present? : Metal implants  Required Braces or Orthoses  Right Lower Extremity Brace:  (Shoe elevator for RLE)  Left Lower Extremity Brace: [employer]  Position Activity Restriction  Other Position/Activity Restrictions: Lift on R foot,

## 2025-04-25 NOTE — PROGRESS NOTES
Patient alert an oriented x 4. Able to make needs known. Report called x 3 but Taisha said they will call back. Personal belonging listed sent with patient. Winters draining and patent. Patient educated about colostomy care. She said she had colostomy before so she knows how to change the colostomy bag. Patient had shower today. Gown changed twice. Stable condition upon discharge. Ride arrived. Report given.

## 2025-04-25 NOTE — PROGRESS NOTES
Patient states feeling fairly well.  Tolerating regular diet  Had an associated upper back change twice last night little bit of leak around the medial side.  Discussed with her this may improve with different styles of brands, once we get her staples out also wants to get the stoma bridge out as well.    On examination patient looks to be quite awake alert and oriented.  The lung excursion  Abdomen is rounded but soft and nondistended  Midline incision clean and dry  Stoma pink and viable.    Electrolytes unremarkable.  Sugars are running in the mid 100 range.  White count is normal and hemoglobin fairly stable, if anything a little bit up at 10.0.    Believe patient could be discharged to rehab.  Will write orders for the same.  Case management states they will need to check with rehab to see if bed available even possibly today or tomorrow.  Discussed with patient like to see her in the office in about a week we will get her staples out at that time and a bridge.  This may make placing the stoma bag a little easier.

## 2025-04-25 NOTE — PROGRESS NOTES
Physical Therapy        Physical Therapy Cancel Note      DATE: 2025    NAME: Komal Crawford  MRN: [id number]   : 1940      Patient not seen this date for Physical Therapy due to:    Other: Transport pending at 16:30. PLAN: continue to pursue PT treatment if pt remains in hospital.      Electronically signed by Gayle Duron PT on 2025 at 4:28 PM

## 2025-04-25 NOTE — PLAN OF CARE
Problem: Safety - Adult  Goal: Free from fall injury  4/25/2025 1659 by Migdalia Reyez RN  Outcome: Adequate for Discharge  4/25/2025 1053 by Migdalia Reyez RN  Outcome: Progressing     Problem: Pain  Goal: Verbalizes/displays adequate comfort level or baseline comfort level  4/25/2025 1659 by Migdalia Reyez RN  Outcome: Adequate for Discharge  4/25/2025 1053 by Migdalia Reyez RN  Outcome: Progressing     Problem: Chronic Conditions and Co-morbidities  Goal: Patient's chronic conditions and co-morbidity symptoms are monitored and maintained or improved  4/25/2025 1659 by Migdalia Reyez RN  Outcome: Adequate for Discharge  4/25/2025 1053 by Migdalia Reyez RN  Outcome: Progressing     Problem: Discharge Planning  Goal: Discharge to home or other facility with appropriate resources  4/25/2025 1659 by Migdalia Reyez RN  Outcome: Adequate for Discharge  4/25/2025 1053 by Migdalia Reyez RN  Outcome: Progressing     Problem: Skin/Tissue Integrity  Goal: Skin integrity remains intact  Description: 1.  Monitor for areas of redness and/or skin breakdown2.  Assess vascular access sites hourly3.  Every 4-6 hours minimum:  Change oxygen saturation probe site4.  Every 4-6 hours:  If on nasal continuous positive airway pressure, respiratory therapy assess nares and determine need for appliance change or resting period  4/25/2025 1659 by Migdalia Reyez RN  Outcome: Adequate for Discharge  4/25/2025 1053 by Migdalia Reyez RN  Outcome: Progressing     Problem: Nutrition Deficit:  Goal: Optimize nutritional status  4/25/2025 1659 by Migdalia Reyez RN  Outcome: Adequate for Discharge  4/25/2025 1053 by Migdalia Reyez RN  Outcome: Progressing

## 2025-04-26 LAB
MICROORGANISM SPEC CULT: ABNORMAL
SPECIMEN DESCRIPTION: ABNORMAL

## 2025-04-28 NOTE — CARE COORDINATION
SECURE email notification sent to identified IDT members at   [facility]

## 2025-05-05 ENCOUNTER — OFFICE VISIT (OUTPATIENT)
Dept: ORTHOPEDIC SURGERY | Age: 85
End: 2025-05-05
Payer: MEDICARE

## 2025-05-05 VITALS — WEIGHT: 139 LBS | HEIGHT: 57 IN | BODY MASS INDEX: 29.99 KG/M2 | RESPIRATION RATE: 18 BRPM

## 2025-05-05 DIAGNOSIS — S82.842A BIMALLEOLAR FRACTURE, LEFT, CLOSED, INITIAL ENCOUNTER: Primary | ICD-10-CM

## 2025-05-05 PROCEDURE — 1123F ACP DISCUSS/DSCN MKR DOCD: CPT | Performed by: ORTHOPAEDIC SURGERY

## 2025-05-05 PROCEDURE — 1111F DSCHRG MED/CURRENT MED MERGE: CPT | Performed by: ORTHOPAEDIC SURGERY

## 2025-05-05 PROCEDURE — G8417 CALC BMI ABV UP PARAM F/U: HCPCS | Performed by: ORTHOPAEDIC SURGERY

## 2025-05-05 PROCEDURE — 1125F AMNT PAIN NOTED PAIN PRSNT: CPT | Performed by: ORTHOPAEDIC SURGERY

## 2025-05-05 PROCEDURE — 1036F TOBACCO NON-USER: CPT | Performed by: ORTHOPAEDIC SURGERY

## 2025-05-05 PROCEDURE — 99213 OFFICE O/P EST LOW 20 MIN: CPT | Performed by: ORTHOPAEDIC SURGERY

## 2025-05-05 PROCEDURE — 1159F MED LIST DOCD IN RCRD: CPT | Performed by: ORTHOPAEDIC SURGERY

## 2025-05-05 PROCEDURE — G8400 PT W/DXA NO RESULTS DOC: HCPCS | Performed by: ORTHOPAEDIC SURGERY

## 2025-05-05 PROCEDURE — G8427 DOCREV CUR MEDS BY ELIG CLIN: HCPCS | Performed by: ORTHOPAEDIC SURGERY

## 2025-05-05 PROCEDURE — 1090F PRES/ABSN URINE INCON ASSESS: CPT | Performed by: ORTHOPAEDIC SURGERY

## 2025-05-05 NOTE — PROGRESS NOTES
[facility]                   Damian Keyes M.D.            [address], [address]               [place], [place] [address]           Dept Phone: [phone]           Dept Fax:  [phone] [address] [facility]                       [address]           [place], [place] [address]          Dept Phone: [phone]           Dept Fax:  [phone]      Chief Compliant:  Chief Complaint   Patient presents with    Pain     H/O Lt TKA revision        History of Present Illness:  This is a 84 y.o. female who presents to the clinic today for evaluation / follow up of left knee and left ankle.     Date of Surgery:   1/23/2024 - Left total knee arthroplasty revision with OSS  3/8/2024 - Left knee incision and drainage with wound VAC placement  4/2/2024 - Left knee incision and drainage with quad tendon repair and wound closure with Prevena wound VAC.     History of Present Illness  The patient is an 84-year-old female here today for follow-up regarding her left knee periprosthetic distal femur fracture with subsequent OSS hinged knee prosthesis. She underwent irrigation and debridement for a superficial infection and subsequent irrigation and debridement of the joint itself with Dr. Keyes, with the last surgery being on 04/02/2024.      The patient is also being managed by Christo Cedillo PA-C, and Dr. Johnson for a left ankle fracture. Today, she is here for reevaluation of her left knee. She did have a recent CT scan of the left knee completed on 01/23/2025 ordered by Dr. Johnson's office. There was evidence of cellulitis without concern for obvious abscess or infection of the joint. The total knee arthroplasty hardware appears to be in great position at that time. The patient is chronically on doxycycline as recommended by infectious disease and continues to take the medication as prescribed.     She reports persistent soreness in her left knee. She has been massaging and

## 2025-05-09 NOTE — DISCHARGE INSTRUCTIONS
[facility] WOUND and HYPERBARIC TREATMENT  CENTER                            Visit  Discharge Instructions / Physician Orders  DATE: 5/12//2025     Home Care:[facility] 2 x per week     SUPPLIES ORDERED THRU: Home care to order supplies due to medicare guidelines                        Wound Location:  Left buttock     Cleanse with: Liquid antibacterial soap and water, rinse well      Dressing Orders:   Triad cream to bottom daily and as needed                         Frequency:   daily and as needed      Additional Orders: Increase protein to diet (meat, cheese, eggs, fish, peanut butter, nuts and beans)  Multivitamin daily   Will measure for compression wraps Juxta fit wraps at next appointment  OFFLOADING [x] YES  TYPE:                  [] NA  GEL cushion (get off amazon)  Weekly wound care visits until determined otherwise.     Antibiotic therapy-wound care related YES [x] NO [] NA[]  Doxycycline 100 mg 2 x per day for chronic suppression-continue Doxycycline while getting bladder irrigations per Dr. Arteaga  Lotrimin cream around SP cath site daily-stop powder for now  MY CHART []     Smart Device  []      HYPERBARIC TREATMENT-                TREATMENT #                          Your next appointment with the Wound Care Center is in  4 weeks                                                                                                   (Please note your next appointment above and if you are unable to keep, kindly give a 24 hour notice. Thank you.)  If more than 15 min late we cannot guarantee you will be seen due to clinician schedule  Per Policy, Excessive cancellation will call for dismissal from program.  If you experience any of the following, please call the Wound Care Center during business hours:  [phone]     * Increase in Pain  * Temperature over 101  * Increase in drainage from your wound  * Drainage with a foul odor  * Bleeding  * Increase in swelling  * Need for compression bandage changes due

## 2025-05-12 ENCOUNTER — HOSPITAL ENCOUNTER (OUTPATIENT)
Dept: WOUND CARE | Age: 85
Discharge: HOME | End: 2025-05-12

## 2025-05-13 NOTE — DISCHARGE INSTRUCTIONS
[facility] WOUND and HYPERBARIC TREATMENT  CENTER                            Visit  Discharge Instructions / Physician Orders  DATE: 5/19/2025     Home Care:[facility] 2 x per week     SUPPLIES ORDERED THRU: Home care to order supplies due to medicare guidelines                        Wound Location:  Left buttock-healed     Cleanse with: Liquid antibacterial soap and water, rinse well      Dressing Orders:   Triad cream to bottom daily and as needed                         Frequency:   daily and as needed      Additional Orders: Increase protein to diet (meat, cheese, eggs, fish, peanut butter, nuts and beans)  Multivitamin daily   Will measure for compression wraps Juxta fit wraps at next appointment  OFFLOADING [x] YES  TYPE:                  [] NA  GEL cushion (get off amazon)  Weekly wound care visits until determined otherwise.     Antibiotic therapy-wound care related YES [x] NO [] NA[]  Doxycycline 100 mg 2 x per day for chronic suppression-continue Doxycycline while getting bladder irrigations per Dr. Arteaga  Lotrimin cream around SP cath site daily-stop powder for now  MY CHART []     Smart Device  []      HYPERBARIC TREATMENT-                TREATMENT #                          Your next appointment with the Wound Care Center is in  4 weeks                                                                                                   (Please note your next appointment above and if you are unable to keep, kindly give a 24 hour notice. Thank you.)  If more than 15 min late we cannot guarantee you will be seen due to clinician schedule  Per Policy, Excessive cancellation will call for dismissal from program.  If you experience any of the following, please call the Wound Care Center during business hours:  [phone]     * Increase in Pain  * Temperature over 101  * Increase in drainage from your wound  * Drainage with a foul odor  * Bleeding  * Increase in swelling  * Need for compression bandage changes

## 2025-05-19 ENCOUNTER — HOSPITAL ENCOUNTER (OUTPATIENT)
Dept: WOUND CARE | Age: 85
Discharge: HOME | End: 2025-05-19
Payer: MEDICARE

## 2025-05-19 VITALS
SYSTOLIC BLOOD PRESSURE: 173 MMHG | RESPIRATION RATE: 16 BRPM | DIASTOLIC BLOOD PRESSURE: 74 MMHG | HEART RATE: 76 BPM | TEMPERATURE: 97.1 F

## 2025-05-19 DIAGNOSIS — L89.323 DECUBITUS ULCER OF LEFT BUTTOCK, STAGE 3 (HCC): Primary | ICD-10-CM

## 2025-05-19 DIAGNOSIS — T84.54XD INFECTION ASSOCIATED WITH INTERNAL LEFT KNEE PROSTHESIS, SUBSEQUENT ENCOUNTER: ICD-10-CM

## 2025-05-19 PROCEDURE — 99213 OFFICE O/P EST LOW 20 MIN: CPT | Performed by: INTERNAL MEDICINE

## 2025-05-19 PROCEDURE — 99213 OFFICE O/P EST LOW 20 MIN: CPT

## 2025-05-19 RX ORDER — MUPIROCIN 20 MG/G
OINTMENT TOPICAL PRN
OUTPATIENT
Start: 2025-05-19

## 2025-05-19 RX ORDER — SODIUM CHLOR/HYPOCHLOROUS ACID 0.033 %
SOLUTION, IRRIGATION IRRIGATION PRN
OUTPATIENT
Start: 2025-05-19

## 2025-05-19 RX ORDER — LIDOCAINE HYDROCHLORIDE 20 MG/ML
JELLY TOPICAL PRN
OUTPATIENT
Start: 2025-05-19

## 2025-05-19 RX ORDER — LIDOCAINE HYDROCHLORIDE 40 MG/ML
SOLUTION TOPICAL PRN
Status: DISCONTINUED | OUTPATIENT
Start: 2025-05-19 | End: 2025-05-20 | Stop reason: HOSPADM

## 2025-05-19 RX ORDER — CLOBETASOL PROPIONATE 0.5 MG/G
OINTMENT TOPICAL PRN
OUTPATIENT
Start: 2025-05-19

## 2025-05-19 RX ORDER — SILVER SULFADIAZINE 10 MG/G
CREAM TOPICAL PRN
OUTPATIENT
Start: 2025-05-19

## 2025-05-19 RX ORDER — LIDOCAINE 40 MG/G
CREAM TOPICAL PRN
OUTPATIENT
Start: 2025-05-19

## 2025-05-19 RX ORDER — LIDOCAINE HYDROCHLORIDE 40 MG/ML
SOLUTION TOPICAL PRN
OUTPATIENT
Start: 2025-05-19

## 2025-05-19 RX ORDER — BETAMETHASONE DIPROPIONATE 0.5 MG/G
CREAM TOPICAL PRN
OUTPATIENT
Start: 2025-05-19

## 2025-05-19 RX ORDER — GENTAMICIN SULFATE 1 MG/G
OINTMENT TOPICAL PRN
OUTPATIENT
Start: 2025-05-19

## 2025-05-19 RX ORDER — NEOMYCIN/BACITRACIN/POLYMYXINB 3.5-400-5K
OINTMENT (GRAM) TOPICAL PRN
OUTPATIENT
Start: 2025-05-19

## 2025-05-19 RX ORDER — BACITRACIN ZINC AND POLYMYXIN B SULFATE 500; 1000 [USP'U]/G; [USP'U]/G
OINTMENT TOPICAL PRN
OUTPATIENT
Start: 2025-05-19

## 2025-05-19 RX ORDER — DOXYCYCLINE 100 MG/1
100 CAPSULE ORAL 2 TIMES DAILY
COMMUNITY

## 2025-05-19 RX ORDER — LIDOCAINE 50 MG/G
OINTMENT TOPICAL PRN
OUTPATIENT
Start: 2025-05-19

## 2025-05-19 RX ORDER — TRIAMCINOLONE ACETONIDE 1 MG/G
OINTMENT TOPICAL PRN
OUTPATIENT
Start: 2025-05-19

## 2025-05-19 RX ORDER — GINSENG 100 MG
CAPSULE ORAL PRN
OUTPATIENT
Start: 2025-05-19

## 2025-05-19 RX ORDER — FAMOTIDINE 20 MG/1
20 TABLET, FILM COATED ORAL
COMMUNITY

## 2025-05-19 NOTE — PROGRESS NOTES
TONSILLECTOMY  1978    TOTAL HIP ARTHROPLASTY Right 2023    HIP TOTAL ARTHROPLASTY performed by Jon Bonner MD at [facility] OR    TOTAL HIP ARTHROPLASTY Left 2024    HIP TOTAL ARTHROPLASTY performed by Jon Bonner MD at [facility] OR    UPPER GASTROINTESTINAL ENDOSCOPY  2014    UPPER GASTROINTESTINAL ENDOSCOPY  2016    mild gastritis    UPPER GASTROINTESTINAL ENDOSCOPY N/A 2018    retained thick secretions in proximal esophagus       FAMILY HISTORY    Family History   Problem Relation Age of Onset    Breast Cancer Mother     Cancer Mother         breast and uterine  41    Heart Disease Father     Heart Attack Father          32    Cancer Maternal Grandmother     Cancer Brother 52        bronchogenic adenocarcinoma cancer    Lung Cancer Brother     Cancer Sister         lung    Lung Cancer Sister          65    Breast Cancer Sister          57    Cancer Sister         breast       SOCIAL HISTORY    Social History     Tobacco Use    Smoking status: Former     Current packs/day: 0.00     Average packs/day: 0.5 packs/day for 30.0 years (15.0 ttl pk-yrs)     Types: Cigarettes     Start date: 1952     Quit date: 1982     Years since quittin.9     Passive exposure: Past    Smokeless tobacco: Former     Quit date: 1982   Vaping Use    Vaping status: Never Used   Substance Use Topics    Alcohol use: No     Comment: very rare glass of wine    Drug use: No       ALLERGIES    Allergies   Allergen Reactions    Cephalosporins Hives     Swelling of eyes    Fluorescein Hives and Swelling    Iodides Anaphylaxis, Hives and Itching     \"Contrast dyes\"  This was added by someone but Patient states has had IVP dyes multiple times without problems and had a myelogram in Dec 2016 without problems    Iodinated Contrast Media Anaphylaxis, Hives and Itching    Povidone-Iodine Anaphylaxis, Hives and Swelling    Sulfa Antibiotics Hives and Swelling     Other

## 2025-05-21 ENCOUNTER — CARE COORDINATION (OUTPATIENT)
Dept: CARE COORDINATION | Age: 85
End: 2025-05-21

## 2025-05-21 NOTE — CARE COORDINATION
follow-up appointments. If no appointment was previously scheduled, appointment scheduling offered: Yes.   Is follow up appointment scheduled within 7 days of discharge? Yes.    Follow Up  Future Appointments   Date Time Provider Department Center   6/16/2025 10:00 AM Taleb, [place], MD [facility]       [facility] Acute Care Manager provided contact information.  Plan for follow-up call in 5-7 days based on severity of symptoms and risk factors.  Plan for next call: symptom management-new or worsening symptoms  follow-up appointment-PCP   referral to ambulatory care manager-today 5/21/2025 for f/u and acp documents    BREANNA WILHELM RN

## 2025-05-28 DIAGNOSIS — Z86.73 HISTORY OF STROKE: ICD-10-CM

## 2025-05-28 RX ORDER — GABAPENTIN 100 MG/1
CAPSULE ORAL
Qty: 30 CAPSULE | Refills: 0 | Status: SHIPPED | OUTPATIENT
Start: 2025-05-28 | End: 2025-08-26

## 2025-05-28 RX ORDER — CLOPIDOGREL BISULFATE 75 MG/1
TABLET ORAL
Qty: 30 TABLET | Refills: 0 | Status: SHIPPED | OUTPATIENT
Start: 2025-05-28

## 2025-05-29 ENCOUNTER — CARE COORDINATION (OUTPATIENT)
Dept: CARE COORDINATION | Age: 85
End: 2025-05-29

## 2025-05-29 NOTE — CARE COORDINATION
Gayle Richardson, RN, Post Acute Care Manager referred patient for ambulatory care management.    Per Gayle's note:      Pt was recently admitted to the hospital for colostomy creation 2/2 skin breakdown 2/2 chronic loose stools. Stoma continues to heal as expected. Pt was given prescription for colostomy supplies. Pt progressed well with therapy during stay - scheduled to discharge to a private home tomorrow with [facility]. Ok for pt to take remaining meds home upon dc (including open card of [place]).     Patient has an appointment with PCP this morning.  Writer plans to follow up with Patient tomorrow.  ACM introduction letter sent to Patient's My Chart.

## 2025-05-30 ENCOUNTER — CARE COORDINATION (OUTPATIENT)
Dept: CARE COORDINATION | Age: 85
End: 2025-05-30

## 2025-05-30 NOTE — CARE COORDINATION
Ambulatory Care Coordination Note     5/30/2025 2:01 PM     Patient outreach attempt by this ACM today to offer care management services. ACM was unable to reach the patient by telephone today;   voicemail full and unable to leave a message.      ACM: Tia Velazquez RN     Care Summary Note:     Gayle Richardson RN, Post Acute Care Manager referred patient for ambulatory care management.    Per Gayle's note:       Pt was recently admitted to the hospital for colostomy creation 2/2 skin breakdown 2/2 chronic loose stools. Stoma continues to heal as expected. Pt was given prescription for colostomy supplies. Pt progressed well with therapy during stay - scheduled to discharge to a private home tomorrow with [facility]. Ok for pt to take remaining meds home upon dc (including open card of [place]).     Patient missed appointment with Dr. Bedolla on 5/29/2025.  Appointment needs to be rescheduled.    Writer phoned Patient to introduce self and explain ACM.  Patient's voice mail is full.  Writer unable to contact Patient.  Writer will attempt to call Patient next week.      ACM introduction letter sent to Patient's My Chart.    PCP/Specialist follow up:   Future Appointments         Provider Specialty Dept Phone    6/4/2025 3:00 PM Benja Cedillo PA Orthopedic Surgery [phone]    6/16/2025 10:00 AM David Arteaga MD Wound Ostomy [phone]            Follow Up:   Plan for next ACM outreach in approximately 1 week to complete:  - outreach attempt to offer care management services.

## 2025-06-04 ENCOUNTER — OFFICE VISIT (OUTPATIENT)
Dept: ORTHOPEDIC SURGERY | Age: 85
End: 2025-06-04
Payer: MEDICARE

## 2025-06-04 ENCOUNTER — TELEPHONE (OUTPATIENT)
Dept: PRIMARY CARE CLINIC | Age: 85
End: 2025-06-04

## 2025-06-04 VITALS — RESPIRATION RATE: 14 BRPM | HEIGHT: 57 IN | BODY MASS INDEX: 32.36 KG/M2 | WEIGHT: 150 LBS

## 2025-06-04 DIAGNOSIS — R52 PAIN: ICD-10-CM

## 2025-06-04 DIAGNOSIS — S82.842A BIMALLEOLAR FRACTURE, LEFT, CLOSED, INITIAL ENCOUNTER: Primary | ICD-10-CM

## 2025-06-04 PROCEDURE — G8400 PT W/DXA NO RESULTS DOC: HCPCS | Performed by: PHYSICIAN ASSISTANT

## 2025-06-04 PROCEDURE — 1123F ACP DISCUSS/DSCN MKR DOCD: CPT | Performed by: PHYSICIAN ASSISTANT

## 2025-06-04 PROCEDURE — 1125F AMNT PAIN NOTED PAIN PRSNT: CPT | Performed by: PHYSICIAN ASSISTANT

## 2025-06-04 PROCEDURE — G8417 CALC BMI ABV UP PARAM F/U: HCPCS | Performed by: PHYSICIAN ASSISTANT

## 2025-06-04 PROCEDURE — 1090F PRES/ABSN URINE INCON ASSESS: CPT | Performed by: PHYSICIAN ASSISTANT

## 2025-06-04 PROCEDURE — G8427 DOCREV CUR MEDS BY ELIG CLIN: HCPCS | Performed by: PHYSICIAN ASSISTANT

## 2025-06-04 PROCEDURE — 99213 OFFICE O/P EST LOW 20 MIN: CPT | Performed by: PHYSICIAN ASSISTANT

## 2025-06-04 PROCEDURE — 1036F TOBACCO NON-USER: CPT | Performed by: PHYSICIAN ASSISTANT

## 2025-06-04 NOTE — PROGRESS NOTES
FRACTURE SURGERY Right 2011    hip    FRACTURE SURGERY Left 2001    WRIST    FRACTURE SURGERY Left 2013    ankle    HERNIA REPAIR      HIP SURGERY Right 11/06/2023    HIP HARDWARE REMOVAL - WITH DEEP HARDWARE REMOVAL 7.3 NABILA SCREW   X3 performed by Jon Bonner MD at [facility] OR    HYSTERECTOMY (CERVIX STATUS UNKNOWN)  1969    JOINT REPLACEMENT Bilateral 2000    BILAT KNEES- 2007 repeated tkaa done    KNEE SURGERY Left 03/08/2024    KNEE INCISION AND DRAINAGE WITH WOUND VAC APPLICATION LEFT performed by Damian Keyes MD at [facility] OR    KNEE SURGERY Left 04/02/2024    LEFT KNEE INCISION AND DRAINAGE, BACTISURE WOUND LAVAGE, AND WOUND CLOSURE WITH PREVENA APPLICATION performed by Damian Keyes MD at [facility] OR    LEG MUSCLE SURGERY Left 04/02/2024    REPAIR OF QUADRACEPTS TENDON, performed by Damian Keyes MD at [facility] OR    LUMBAR FUSION  2017    L2/L3    [place] XCAPSL CTRC RMVL INSJ IO LENS PROSTH W/O ECP Left 11/07/2017    EYE CATARACT EMULSIFICATION IOL IMPLANT performed by Missy Clements MD at [facility] OR    [place] XCAPSL CTRC RMVL INSJ IO LENS PROSTH W/O ECP Right 11/28/2017    EYE CATARACT EMULSIFICATION IOL IMPLANT performed by Missy Clements MD at [facility] OR    REVISION COLOSTOMY  1986    REVISION TOTAL KNEE ARTHROPLASTY Right 10/27/2015    WITH POLY EXCHANGE BIOMET AND GPS APPLICATION    REVISION TOTAL KNEE ARTHROPLASTY Left 07/14/2020    KNEE TOTAL ARTHROPLASTY REVISION - POLY EXCHANGE performed by Damian Keyes MD at [facility] OR    REVISION TOTAL KNEE ARTHROPLASTY Left 01/23/2024    KNEE TOTAL ARTHROPLASTY REVISION WITH OSS performed by Damian Keyes MD at [facility] OR    SMALL INTESTINE SURGERY N/A 4/18/2025    LAPAROSCOPIC ROBOTIC CONVERSION TO OPEN TRANSVERSE COLOSTOMY CREATION WITH LOOP AND LYSIS OF ADHESIONS performed by Sherrie Lawler MD at [facility] OR    SPINE SURGERY  2010    fusion, did not take    SPINE SURGERY  1990    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

## 2025-06-04 NOTE — TELEPHONE ENCOUNTER
Writer called patient to clarify type of appointment that is needed and patient was currently at another appointment and not able to discuss. Will call us once she is out.  We need to know if patient had Colostomy surgery already and if so where was it so we can obtain information, or does she need or want one.  Is [facility] f/u, does that mean she was seen there, did they do the procedure or is it her home or does she need to be seen there per [facility].

## 2025-06-05 PROBLEM — A41.9 SEPSIS (HCC): Status: RESOLVED | Noted: 2024-03-06 | Resolved: 2025-06-05

## 2025-06-05 PROBLEM — R65.20 SEVERE SEPSIS (HCC): Status: RESOLVED | Noted: 2024-03-07 | Resolved: 2025-06-05

## 2025-06-05 PROBLEM — A41.9 SEVERE SEPSIS (HCC): Status: RESOLVED | Noted: 2024-03-07 | Resolved: 2025-06-05

## 2025-06-05 NOTE — ASSESSMENT & PLAN NOTE
Recent colostomy in April with stay at [facility] after procedure.  Per review of prior PCP notes she has been in and out of nursing homes.

## 2025-06-10 ENCOUNTER — TELEPHONE (OUTPATIENT)
Dept: ORTHOPEDIC SURGERY | Age: 85
End: 2025-06-10

## 2025-06-10 ENCOUNTER — OFFICE VISIT (OUTPATIENT)
Dept: PRIMARY CARE CLINIC | Age: 85
End: 2025-06-10

## 2025-06-10 VITALS
DIASTOLIC BLOOD PRESSURE: 88 MMHG | WEIGHT: 150 LBS | BODY MASS INDEX: 32.36 KG/M2 | SYSTOLIC BLOOD PRESSURE: 130 MMHG | HEART RATE: 81 BPM | OXYGEN SATURATION: 99 % | HEIGHT: 57 IN

## 2025-06-10 DIAGNOSIS — E78.00 PURE HYPERCHOLESTEROLEMIA: Chronic | ICD-10-CM

## 2025-06-10 DIAGNOSIS — S82.842A BIMALLEOLAR FRACTURE, LEFT, CLOSED, INITIAL ENCOUNTER: Primary | ICD-10-CM

## 2025-06-10 DIAGNOSIS — E11.51 TYPE 2 DIABETES MELLITUS WITH DIABETIC PERIPHERAL ANGIOPATHY WITHOUT GANGRENE, WITHOUT LONG-TERM CURRENT USE OF INSULIN (HCC): ICD-10-CM

## 2025-06-10 DIAGNOSIS — I10 ESSENTIAL HYPERTENSION: ICD-10-CM

## 2025-06-10 DIAGNOSIS — Z76.89 ENCOUNTER TO ESTABLISH CARE: Primary | ICD-10-CM

## 2025-06-10 DIAGNOSIS — I48.11 LONGSTANDING PERSISTENT ATRIAL FIBRILLATION (HCC): ICD-10-CM

## 2025-06-10 DIAGNOSIS — Z93.3 STATUS POST COLOSTOMY (HCC): ICD-10-CM

## 2025-06-10 DIAGNOSIS — Z79.899 MEDICATION MANAGEMENT: ICD-10-CM

## 2025-06-10 PROBLEM — Z93.59 SUPRAPUBIC CATHETER (HCC): Chronic | Status: ACTIVE | Noted: 2025-04-10

## 2025-06-10 RX ORDER — HYDROCODONE BITARTRATE AND ACETAMINOPHEN 5; 325 MG/1; MG/1
1 TABLET ORAL EVERY 6 HOURS PRN
COMMUNITY
Start: 2025-04-29 | End: 2025-06-13 | Stop reason: SDUPTHER

## 2025-06-10 NOTE — PROGRESS NOTES
y.o. year old female with left bimalleolar ankle fracture  Plan:   Lengthy discussion had with patient about current clinical state.    Patient is adamant that she would like to avoid surgical intervention loss in this department and that her left ankle is overall feeling quite well from a pain perspective.  - Patient has fully healed at this point albeit in a malunited position continues to report feelings of instability and pain with any prolonged weightbearing activity  - She inquires about a different supportive ankle brace.  She is educated that we have exhausted all of our options given that she has unable to tolerate her takeover external ankle brace I did reach out to a local orthotist who had recommended trial of shoe build with a Velcro   - ASO brace or be much easier than a lace up ankle brace.  Prescription is provided for this   -Follow-up on as-needed basis    Electronically signed by AC Farah on 6/10/2025 at 8:50 AM    This note is created with the assistance of a speech recognition program.  While intending to generate a document that actually reflects the content of the visit, the document can still have some errors including those of syntax and sound a like substitutions which may escape proof reading.  In such instances, actual meaning can be extrapolated by contextual diversion

## 2025-06-10 NOTE — TELEPHONE ENCOUNTER
Patient is calling to have order for shoes and brace faxed to [facility] in [place] on [address] and fax# is [phone]

## 2025-06-11 ENCOUNTER — APPOINTMENT (OUTPATIENT)
Dept: GENERAL RADIOLOGY | Age: 85
End: 2025-06-11
Payer: MEDICARE

## 2025-06-11 ENCOUNTER — HOSPITAL ENCOUNTER (EMERGENCY)
Age: 85
Discharge: HOME | End: 2025-06-11
Attending: EMERGENCY MEDICINE
Payer: MEDICARE

## 2025-06-11 VITALS
TEMPERATURE: 97.5 F | BODY MASS INDEX: 32.36 KG/M2 | RESPIRATION RATE: 18 BRPM | DIASTOLIC BLOOD PRESSURE: 138 MMHG | SYSTOLIC BLOOD PRESSURE: 154 MMHG | HEIGHT: 57 IN | HEART RATE: 73 BPM | WEIGHT: 150 LBS | OXYGEN SATURATION: 95 %

## 2025-06-11 DIAGNOSIS — S93.402A SPRAIN OF LEFT ANKLE, UNSPECIFIED LIGAMENT, INITIAL ENCOUNTER: Primary | ICD-10-CM

## 2025-06-11 LAB
ALBUMIN SERPL-MCNC: 3.4 G/DL (ref 3.5–5.2)
ALP SERPL-CCNC: 98 U/L (ref 35–104)
ALT SERPL-CCNC: 11 U/L (ref 10–35)
ANION GAP SERPL CALCULATED.3IONS-SCNC: 10 MMOL/L (ref 9–16)
AST SERPL-CCNC: 15 U/L (ref 10–35)
BACTERIA URNS QL MICRO: ABNORMAL
BASOPHILS # BLD: 0 K/UL (ref 0–0.2)
BASOPHILS NFR BLD: 0 % (ref 0–2)
BILIRUB SERPL-MCNC: 0.3 MG/DL (ref 0–1.2)
BILIRUB UR QL STRIP: NEGATIVE
BUN SERPL-MCNC: 13 MG/DL (ref 8–23)
CALCIUM SERPL-MCNC: 8.9 MG/DL (ref 8.6–10.4)
CASTS #/AREA URNS LPF: ABNORMAL /LPF
CHLORIDE SERPL-SCNC: 106 MMOL/L (ref 98–107)
CLARITY UR: ABNORMAL
CO2 SERPL-SCNC: 24 MMOL/L (ref 20–31)
COLOR UR: ABNORMAL
CREAT SERPL-MCNC: 0.8 MG/DL (ref 0.7–1.2)
EOSINOPHIL # BLD: 0.14 K/UL (ref 0–0.4)
EOSINOPHILS RELATIVE PERCENT: 2 % (ref 0–4)
ERYTHROCYTE [DISTWIDTH] IN BLOOD BY AUTOMATED COUNT: 15.9 % (ref 11.5–14.9)
GFR, ESTIMATED: 73 ML/MIN/1.73M2
GLUCOSE SERPL-MCNC: 158 MG/DL (ref 74–99)
GLUCOSE UR STRIP-MCNC: NEGATIVE MG/DL
HCT VFR BLD AUTO: 35 % (ref 36–46)
HGB BLD-MCNC: 11.3 G/DL (ref 12–16)
HGB UR QL STRIP.AUTO: ABNORMAL
IMM GRANULOCYTES # BLD AUTO: 0 K/UL (ref 0–0.3)
IMM GRANULOCYTES NFR BLD: 0 %
KETONES UR STRIP-MCNC: ABNORMAL MG/DL
LEUKOCYTE ESTERASE UR QL STRIP: ABNORMAL
LYMPHOCYTES NFR BLD: 1.12 K/UL (ref 1–4.8)
LYMPHOCYTES RELATIVE PERCENT: 16 % (ref 24–44)
MCH RBC QN AUTO: 30.4 PG (ref 26–34)
MCHC RBC AUTO-ENTMCNC: 32.3 G/DL (ref 31–37)
MCV RBC AUTO: 94.1 FL (ref 80–100)
MONOCYTES NFR BLD: 0.49 K/UL (ref 0.1–1.3)
MONOCYTES NFR BLD: 7 % (ref 1–7)
MORPHOLOGY: ABNORMAL
MORPHOLOGY: ABNORMAL
NEUTROPHILS NFR BLD: 75 % (ref 36–66)
NEUTS SEG NFR BLD: 5.25 K/UL (ref 1.3–9.1)
NITRITE UR QL STRIP: POSITIVE
NRBC BLD-RTO: 0 PER 100 WBC
PH UR STRIP: 5.5 [PH] (ref 5–8)
PLATELET # BLD AUTO: 164 K/UL (ref 150–450)
PMV BLD AUTO: ABNORMAL FL (ref 8–13.5)
POTASSIUM SERPL-SCNC: 4.1 MMOL/L (ref 3.7–5.3)
PROT SERPL-MCNC: 6.2 G/DL (ref 6.6–8.7)
PROT UR STRIP-MCNC: ABNORMAL MG/DL
RBC # BLD AUTO: 3.72 M/UL (ref 3.95–5.11)
RBC #/AREA URNS HPF: ABNORMAL /HPF
SODIUM SERPL-SCNC: 140 MMOL/L (ref 136–145)
SP GR UR STRIP: 1.02 (ref 1–1.03)
UROBILINOGEN UR STRIP-ACNC: NORMAL EU/DL (ref 0–1)
WBC #/AREA URNS HPF: ABNORMAL /HPF
WBC OTHER # BLD: 7 K/UL (ref 3.5–11)

## 2025-06-11 PROCEDURE — 73630 X-RAY EXAM OF FOOT: CPT

## 2025-06-11 PROCEDURE — 85025 COMPLETE CBC W/AUTO DIFF WBC: CPT

## 2025-06-11 PROCEDURE — 73562 X-RAY EXAM OF KNEE 3: CPT

## 2025-06-11 PROCEDURE — 99284 EMERGENCY DEPT VISIT MOD MDM: CPT

## 2025-06-11 PROCEDURE — 80053 COMPREHEN METABOLIC PANEL: CPT

## 2025-06-11 PROCEDURE — 6370000000 HC RX 637 (ALT 250 FOR IP): Performed by: EMERGENCY MEDICINE

## 2025-06-11 PROCEDURE — 87186 SC STD MICRODIL/AGAR DIL: CPT

## 2025-06-11 PROCEDURE — 73590 X-RAY EXAM OF LOWER LEG: CPT

## 2025-06-11 PROCEDURE — 36415 COLL VENOUS BLD VENIPUNCTURE: CPT

## 2025-06-11 PROCEDURE — 87077 CULTURE AEROBIC IDENTIFY: CPT

## 2025-06-11 PROCEDURE — 81001 URINALYSIS AUTO W/SCOPE: CPT

## 2025-06-11 PROCEDURE — 87086 URINE CULTURE/COLONY COUNT: CPT

## 2025-06-11 PROCEDURE — 73610 X-RAY EXAM OF ANKLE: CPT

## 2025-06-11 RX ORDER — ACETAMINOPHEN 500 MG
1000 TABLET ORAL ONCE
Status: COMPLETED | OUTPATIENT
Start: 2025-06-11 | End: 2025-06-11

## 2025-06-11 RX ADMIN — ACETAMINOPHEN 1000 MG: 500 TABLET ORAL at 12:05

## 2025-06-11 ASSESSMENT — PAIN DESCRIPTION - LOCATION
LOCATION: ANKLE;FOOT
LOCATION: FOOT;ANKLE

## 2025-06-11 ASSESSMENT — PAIN DESCRIPTION - ORIENTATION
ORIENTATION: LEFT
ORIENTATION: LEFT

## 2025-06-11 ASSESSMENT — PAIN DESCRIPTION - DESCRIPTORS
DESCRIPTORS: DULL
DESCRIPTORS: DULL

## 2025-06-11 ASSESSMENT — PAIN SCALES - GENERAL
PAINLEVEL_OUTOF10: 5
PAINLEVEL_OUTOF10: 3

## 2025-06-11 NOTE — ED NOTES
The following labs were labeled with appropriate pt sticker and tubed to lab:      [x] Lavender     [x] Green/yellow

## 2025-06-11 NOTE — ED PROVIDER NOTES
EMERGENCY DEPARTMENT ENCOUNTER    Pt Name: Komal Crawford  MRN: [id number]  Birthdate 1940  Date of evaluation: 6/11/25  CHIEF COMPLAINT       Chief Complaint   Patient presents with    Foot Pain     L foot    Fall     Slipped and fell while getting in shower. No LOC did not hit head. On thinners.     HISTORY OF PRESENT ILLNESS   HPI  Slip in shower, foot was caught, twisted left foot and left ankle, having pain  Has chronic left knee and hip pain, no new pain, had previous surgery, uses walker and motor scooter at home  Lives at home with   Has [facility] home care also  No head or neck or back pain  Gets clindamycin infusions into her bladder reeves cath twice a week for chronic UTI, she has chronic cystitis with a chronic indwelling Reeves she follows up with infectious disease Dr. Arteaga, and urology Dr. Jean    REVIEW OF SYSTEMS     Review of Systems   All other systems reviewed and are negative.    PASTMEDICAL HISTORY     Past Medical History:   Diagnosis Date    Acquired absence of both cervix and uterus     Acquired absence of other organs     Age-related cognitive decline     Age-related osteoporosis without current pathological fracture     Ankle pain     Left ankle fracture in ortho boat.    Anxiety     At risk for falling     Atherosclerotic heart disease of native coronary artery without angina pectoris     2 stents- In mid RCA  Dr. 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 Lynch- pulmonology    Caffeine use     2 coffee/day, 1-2 tea per week    Cataract extraction status of eye, left     Cataract extraction status of right eye     Cellulitis of left lower limb     Cerebral artery occlusion with cerebral infarction (HCC)     [id number]    Chronic anticoagulation     Chronic back pain     Chronic ITP (idiopathic

## 2025-06-11 NOTE — ED NOTES
Pt placed in shoes and foot/ankle brace from home. Pt up and able to walk in room with walker, able to bare weight on L foot/ankle. Dr. Teixeira notified.

## 2025-06-12 ENCOUNTER — CARE COORDINATION (OUTPATIENT)
Dept: CARE COORDINATION | Age: 85
End: 2025-06-12

## 2025-06-12 ENCOUNTER — TELEPHONE (OUTPATIENT)
Dept: PODIATRY | Age: 85
End: 2025-06-12

## 2025-06-12 ENCOUNTER — TELEPHONE (OUTPATIENT)
Dept: PRIMARY CARE CLINIC | Age: 85
End: 2025-06-12

## 2025-06-12 DIAGNOSIS — M16.11 ARTHRITIS OF RIGHT HIP: ICD-10-CM

## 2025-06-12 DIAGNOSIS — Z98.1 S/P LUMBAR FUSION: ICD-10-CM

## 2025-06-12 DIAGNOSIS — M16.12 ARTHRITIS OF LEFT HIP: Primary | ICD-10-CM

## 2025-06-12 SDOH — SOCIAL STABILITY: SOCIAL INSECURITY: ARE YOU MARRIED, WIDOWED, DIVORCED, SEPARATED, NEVER MARRIED, OR LIVING WITH A PARTNER?: MARRIED

## 2025-06-12 SDOH — SOCIAL STABILITY: SOCIAL NETWORK: HOW OFTEN DO YOU ATTEND MEETINGS OF THE CLUBS OR ORGANIZATIONS YOU BELONG TO?: NEVER

## 2025-06-12 SDOH — ECONOMIC STABILITY: HOUSING INSECURITY: IN THE LAST 12 MONTHS, WAS THERE A TIME WHEN YOU WERE NOT ABLE TO PAY THE MORTGAGE OR RENT ON TIME?: NO

## 2025-06-12 SDOH — HEALTH STABILITY: PHYSICAL HEALTH: ON AVERAGE, HOW MANY MINUTES DO YOU ENGAGE IN EXERCISE AT THIS LEVEL?: 0 MIN

## 2025-06-12 SDOH — HEALTH STABILITY: MENTAL HEALTH
DO YOU FEEL STRESS - TENSE, RESTLESS, NERVOUS, OR ANXIOUS, OR UNABLE TO SLEEP AT NIGHT BECAUSE YOUR MIND IS TROUBLED ALL THE TIME - THESE DAYS?: TO SOME EXTENT

## 2025-06-12 SDOH — SOCIAL STABILITY: SOCIAL NETWORK
DO YOU BELONG TO ANY CLUBS OR ORGANIZATIONS SUCH AS CHURCH GROUPS, UNIONS, FRATERNAL OR ATHLETIC GROUPS, OR SCHOOL GROUPS?: YES

## 2025-06-12 SDOH — HEALTH STABILITY: PHYSICAL HEALTH: ON AVERAGE, HOW MANY DAYS PER WEEK DO YOU ENGAGE IN MODERATE TO STRENUOUS EXERCISE (LIKE A BRISK WALK)?: 0 DAYS

## 2025-06-12 SDOH — SOCIAL STABILITY: SOCIAL NETWORK: HOW OFTEN DO YOU ATTEND CHURCH OR RELIGIOUS SERVICES?: MORE THAN 4 TIMES PER YEAR

## 2025-06-12 SDOH — SOCIAL STABILITY: SOCIAL NETWORK: IN A TYPICAL WEEK, HOW MANY TIMES DO YOU TALK ON THE PHONE WITH FAMILY, FRIENDS, OR NEIGHBORS?: ONCE A WEEK

## 2025-06-12 SDOH — ECONOMIC STABILITY: FOOD INSECURITY: HOW HARD IS IT FOR YOU TO PAY FOR THE VERY BASICS LIKE FOOD, HOUSING, MEDICAL CARE, AND HEATING?: NOT HARD AT ALL

## 2025-06-12 SDOH — ECONOMIC STABILITY: FOOD INSECURITY: WITHIN THE PAST 12 MONTHS, YOU WORRIED THAT YOUR FOOD WOULD RUN OUT BEFORE YOU GOT THE MONEY TO BUY MORE.: NEVER TRUE

## 2025-06-12 SDOH — ECONOMIC STABILITY: FOOD INSECURITY: WITHIN THE PAST 12 MONTHS, THE FOOD YOU BOUGHT JUST DIDN'T LAST AND YOU DIDN'T HAVE MONEY TO GET MORE.: NEVER TRUE

## 2025-06-12 SDOH — SOCIAL STABILITY: SOCIAL NETWORK

## 2025-06-12 SDOH — ECONOMIC STABILITY: TRANSPORTATION INSECURITY: IN THE PAST 12 MONTHS, HAS LACK OF TRANSPORTATION KEPT YOU FROM MEDICAL APPOINTMENTS OR FROM GETTING MEDICATIONS?: YES

## 2025-06-12 ASSESSMENT — ACTIVITIES OF DAILY LIVING (ADL): LACK_OF_TRANSPORTATION: NO

## 2025-06-12 NOTE — TELEPHONE ENCOUNTER
Patient had a recent ER visit for a sprained ankle and was told to follow up with Dr. Mckenzie's office. I informed patient that we are unable to see her this week and the doctor will be out of the office next week. I informed patient that she saw Dr. Jose Fernandez last June and to call and see if she can get in there. Patient also stated that she does not have any transportation and she relies on family to take her to appointments. I offered patient number to Dr. Fernandez and she stated she already has it.

## 2025-06-12 NOTE — CARE COORDINATION
She sprained her left foot.    Patient saw Dr. Cedillo on 6/4/2025.  Patient has a bimalleolar ankle fracture.  She would like to avoid surgical intervention.  Dr. Cedillo gave Patient a prescription for an ASO brace.  She hasn't received this brace at this time.  She was informed Writer will notify AC Farah, Ortho and her PC of her fall and ED visit.      Patient no longer uses her Rolator for fall precautions.                   PCP/Specialist follow up:   Future Appointments         Provider Specialty Dept Phone    6/16/2025 8:15 AM David Arteaga MD Wound Ostomy [phone]    6/16/2025 2:30 PM Barry Johnson, DO Orthopedic Surgery [phone]    9/12/2025 8:30 AM Geno Mcdonnell PA-C Primary Care [phone]            Follow Up:   Plan for next ACM outreach in approximately 1 week to complete:  - disease specific assessments  - SDOH assessments  - medication review   - advance care planning   - goal progression  - education .   Patient  is agreeable to this plan.

## 2025-06-13 ENCOUNTER — CARE COORDINATION (OUTPATIENT)
Dept: CARE COORDINATION | Age: 85
End: 2025-06-13

## 2025-06-13 RX ORDER — HYDROCODONE BITARTRATE AND ACETAMINOPHEN 5; 325 MG/1; MG/1
1 TABLET ORAL EVERY 4 HOURS PRN
Qty: 18 TABLET | Refills: 0 | Status: SHIPPED | OUTPATIENT
Start: 2025-06-13 | End: 2025-06-16

## 2025-06-13 NOTE — CARE COORDINATION
Patient was referred to Social Work by Tia Velazquez/[facility],  who states that this patient is in need Para Transist , due to her  inability to go up steps and needs a van to transport her with a  lift.   HC reached out to this patient and found that she's unable   to get to her appointments on Monday, 06/16/25, due to her ride  falling through.  Patient is partially blind as well and needs Para  Transit.  HC was able to leave a message @ Dr. Stacy Arteaga's  office to cancel her appointment for 06/16/25, @ 8:15a, and provided  patients date of birth and phone number for a return call to reschedule  her appointment.  HC and patient were able to contact Dr. Song's  office and cancelled patient's appointment for Monday, 06/16/25, @  2:30p.    Patient was rescheduled for Monday, 06/23/25 @ 8:30 a,  @ the [place] office.      Plan of Care  HC will contact Yadira Castorena @ the [facility]  through the [facility] Commission on Aging to schedule  patient's upcoming appointments.  HC will follow up with the patient regarding patients appointment  that needs to be rescheduled @ Dr. Arteaga's office for Wound Care

## 2025-06-14 LAB
MICROORGANISM SPEC CULT: ABNORMAL
SPECIMEN DESCRIPTION: ABNORMAL

## 2025-06-15 NOTE — DISCHARGE INSTRUCTIONS
[facility] WOUND and HYPERBARIC TREATMENT  CENTER                            Visit  Discharge Instructions / Physician Orders  DATE: 6/16/2025     Home Care:[facility] 2 x per week     SUPPLIES ORDERED THRU: Home care to order supplies due to medicare guidelines                        Wound Location:  Left buttock-healed     Cleanse with: Liquid antibacterial soap and water, rinse well      Dressing Orders:   Triad cream to bottom daily and as needed                         Frequency:   daily and as needed      Additional Orders: Increase protein to diet (meat, cheese, eggs, fish, peanut butter, nuts and beans)  Multivitamin daily   Will measure for compression wraps Juxta fit wraps at next appointment  OFFLOADING [x] YES  TYPE:                  [] NA  GEL cushion (get off amazon)  Weekly wound care visits until determined otherwise.     Antibiotic therapy-wound care related YES [x] NO [] NA[]  Doxycycline 100 mg 2 x per day for chronic suppression-continue Doxycycline while getting bladder irrigations per Dr. Arteaga  Lotrimin cream around SP cath site daily-stop powder for now  MY CHART []     Smart Device  []      HYPERBARIC TREATMENT-                TREATMENT #                          Your next appointment with the Wound Care Center is in  4 weeks                                                                                                   (Please note your next appointment above and if you are unable to keep, kindly give a 24 hour notice. Thank you.)  If more than 15 min late we cannot guarantee you will be seen due to clinician schedule  Per Policy, Excessive cancellation will call for dismissal from program.  If you experience any of the following, please call the Wound Care Center during business hours:  [phone]     * Increase in Pain  * Temperature over 101  * Increase in drainage from your wound  * Drainage with a foul odor  * Bleeding  * Increase in swelling  * Need for compression bandage changes

## 2025-06-16 ENCOUNTER — TELEPHONE (OUTPATIENT)
Dept: WOUND CARE | Age: 85
End: 2025-06-16

## 2025-06-16 ENCOUNTER — HOSPITAL ENCOUNTER (OUTPATIENT)
Dept: WOUND CARE | Age: 85
Discharge: HOME | End: 2025-06-16

## 2025-06-16 ENCOUNTER — CARE COORDINATION (OUTPATIENT)
Dept: CARE COORDINATION | Age: 85
End: 2025-06-16

## 2025-06-16 RX ORDER — ERGOCALCIFEROL 1.25 MG/1
CAPSULE, LIQUID FILLED ORAL
Qty: 4 CAPSULE | Refills: 2 | Status: SHIPPED | OUTPATIENT
Start: 2025-06-16

## 2025-06-16 NOTE — CARE COORDINATION
Yadira Castorena/[facility], returned the HC's  call.  Yadira is familiar with the patient and will reach out  to the patient to see how she can assist.  If there are any   services that she can't assist with she will lt the patient,  know.  HC will contact the patient in the next few days   To determine what the patient needs.      Plan of Care  HC will attempt to sign off of patient   soon, or if additional needs will give  to one of my coworkers, Sol or Shawn.

## 2025-06-16 NOTE — TELEPHONE ENCOUNTER
Patient's [facility] wound care appointment canceled today due to he having a recent hospital stay and unable to make today, message left on he voice mail to call us back to reschedule

## 2025-06-16 NOTE — CARE COORDINATION
HC phoned the [facility] to speak with Yadira  Janethem, who drives for the [facility] on Commission.  Patient  is already familiar with Yadira.  HC left a message with the   requesting a return call to discuss the transportation needs for this  patient.  HC since has contacted the patient and found that she has  rescheduled her appointment with Dr. Arteaga, for Thursday, 06/26/25  @ 9:30a., and her appointment with Dr. Borrero is Monday, 06/23/25  @ 8:30a.  Patient is now in a wheelchair due to her fall and unable to   Walk.  She will need Para Transit.        Plan of Care  HC will speak with Yadira Castorena, share patient's  Transportation needs, and let Yadira assist patient  From there.  Plans to sign off of patient by 06/25/25

## 2025-06-17 ENCOUNTER — CARE COORDINATION (OUTPATIENT)
Dept: CARE COORDINATION | Age: 85
End: 2025-06-17

## 2025-06-17 NOTE — CARE COORDINATION
HC phoned the patient and spoke with her briefly regarding  her conversation with Yadira Castorena/[facility] transportation.    Patient stated that she wasn't in her spot where she had her  information.  HC shared with the patient that she will contact  her later this afternoon.  HC phoned and the patient wasn't  available.  HC will reach out to patient on 06/18/25.      Plan of Care  HC will reach out to patient  On Wednesday, 06/18/25

## 2025-06-18 ENCOUNTER — CARE COORDINATION (OUTPATIENT)
Dept: CARE COORDINATION | Age: 85
End: 2025-06-18

## 2025-06-18 NOTE — CARE COORDINATION
HC phoned patient and found that she was at her appointment   for her hearing aides.  Patient stated that she would be back home  in a few hours.  She was really excited about getting her new hearing  aides.  HC did speak with the patient again.      Plan of Care  HC will reach out to patient next week to   Verify her transportation needs.  HC may   Speak with Yadira Castorena for verification.

## 2025-06-18 NOTE — CARE COORDINATION
Ambulatory Care Coordination Note     2025 2:10 PM     Patient Current Location:  Home: [address]  [place] [address]     ACM contacted the patient by telephone. Verified name and  with patient as identifiers.       ACM: Tia Velazquez RN     Challenges to be reviewed by the provider   Additional needs identified to be addressed with provider No                 Method of communication with provider: staff message.    Utilization: Has the patient been seen in the ED since your last call? no    Care Summary Note:     Writer phoned Patient for ACM update.  Patient informed Writer that she just called Yadira Castorena at [facility], regarding her appointment transportation needs.  She left a message requesting a return call.     Patient is aware of her upcoming appointments.      Patient had a fall at home on 25.  She sprain her left ankle.  Patient notified PCP office regarding severe pain to her left ankle/foot.  Tylenol does not relieve her pain.  Dr. Bedolla submitted a Rx for Vicodin.  Patient states she has adequate pain relief with Vicodin.    Patient was referred to Dr. Mckenzie, Podiatry, from ED visit.  His office is unable to get her in for an appointment.  They recommend Patient call to schedule an appointment with Dr. Jose Fernandez.      Patient confirmed [facility] Care Nurse visits her twice weekly.  She states Vivian is coming tomorrow.  She states Vivian will check her colostomy and wound to her buttocks.  She states she is applying Triad cram to her wound as recommended.    Offered patient enrollment in the Remote Patient Monitoring (RPM) program for in-home monitoring: Yes, but did not enroll at this time: controlled chronic disease management.     Assessments Completed:   General Assessment    Do you have any symptoms that are causing concern?: No          Medications Reviewed:   Patient denies any changes with medications and reports taking all medications as

## 2025-06-18 NOTE — CARE COORDINATION
HC phoned the patient to assist her with figuring out her   appointments and her needed rides.  Patient was under  the assumption that the HC could get transportation for   her.  HC kept encouraging the patient to call Yadira Castorena  from [facility] and allow her to reschedule  patient's appointments to fit her schedule.  HC emphasized  to the patient that she will have to get her free transportation  through [facility] on Aging.  HC stressed  that she has nothing to offer patient with medical transportation.      Plan of Care  HC will make a follow up call to patient next week,  and sign off of patient

## 2025-06-19 RX ORDER — DOXYCYCLINE HYCLATE 100 MG
100 TABLET ORAL 2 TIMES DAILY
Qty: 180 TABLET | Refills: 3 | OUTPATIENT
Start: 2025-06-19

## 2025-06-23 NOTE — DISCHARGE INSTRUCTIONS
[facility] WOUND and HYPERBARIC TREATMENT  CENTER                            Visit  Discharge Instructions / Physician Orders  DATE: 6/26/2025     Home Care:[facility] 2 x per week     SUPPLIES ORDERED THRU: Home care to order supplies due to medicare guidelines                        Wound Location:  Left buttock-healed     Cleanse with: Liquid antibacterial soap and water, rinse well      Dressing Orders:   Triad cream to bottom daily and as needed                         Frequency:   daily and as needed      Additional Orders: Increase protein to diet (meat, cheese, eggs, fish, peanut butter, nuts and beans)  Multivitamin daily   Will measure for compression wraps Juxta fit wraps at next appointment  OFFLOADING [x] YES  TYPE:                  [] NA  GEL cushion (get off amazon)  Weekly wound care visits until determined otherwise.     Antibiotic therapy-wound care related YES [x] NO [] NA[]  Doxycycline 100 mg 2 x per day for chronic suppression-continue Doxycycline while getting bladder irrigations per Dr. Arteaga  Lotrimin cream around SP cath site daily-stop powder for now  MY CHART []     Smart Device  []      HYPERBARIC TREATMENT-                TREATMENT #                          Your next appointment with the Wound Care Center is in  4 weeks                                                                                                   (Please note your next appointment above and if you are unable to keep, kindly give a 24 hour notice. Thank you.)  If more than 15 min late we cannot guarantee you will be seen due to clinician schedule  Per Policy, Excessive cancellation will call for dismissal from program.  If you experience any of the following, please call the Wound Care Center during business hours:  [phone]     * Increase in Pain  * Temperature over 101  * Increase in drainage from your wound  * Drainage with a foul odor  * Bleeding  * Increase in swelling  * Need for compression bandage changes

## 2025-06-24 ENCOUNTER — CARE COORDINATION (OUTPATIENT)
Dept: CARE COORDINATION | Age: 85
End: 2025-06-24

## 2025-06-24 NOTE — CARE COORDINATION
HC spoke to Yadira Castorena/[facility]  Commission on Aging.  Yadira Castorena verified that she will assist   patient with her medical transportation.  Yadira stated that she  will have the patient contact her with all of her appointments,  and Yadira will assist with scheduling and/or rescheduling.        Plan of Care  HC is signing off of patient today

## 2025-06-26 ENCOUNTER — HOSPITAL ENCOUNTER (OUTPATIENT)
Dept: WOUND CARE | Age: 85
Discharge: HOME | End: 2025-06-26
Payer: MEDICARE

## 2025-06-26 ENCOUNTER — HOSPITAL ENCOUNTER (OUTPATIENT)
Dept: WOUND CARE | Age: 85
Discharge: HOME | End: 2025-06-26

## 2025-06-26 VITALS
SYSTOLIC BLOOD PRESSURE: 185 MMHG | WEIGHT: 150 LBS | HEIGHT: 57 IN | BODY MASS INDEX: 32.36 KG/M2 | DIASTOLIC BLOOD PRESSURE: 81 MMHG | RESPIRATION RATE: 18 BRPM | HEART RATE: 83 BPM | TEMPERATURE: 99.1 F

## 2025-06-26 DIAGNOSIS — L89.323 DECUBITUS ULCER OF LEFT BUTTOCK, STAGE 3 (HCC): ICD-10-CM

## 2025-06-26 DIAGNOSIS — T84.54XS INFECTION OF TOTAL LEFT KNEE REPLACEMENT, SEQUELA: Primary | ICD-10-CM

## 2025-06-26 DIAGNOSIS — K59.01 SLOW TRANSIT CONSTIPATION: ICD-10-CM

## 2025-06-26 DIAGNOSIS — Z93.3 STATUS POST COLOSTOMY (HCC): Primary | Chronic | ICD-10-CM

## 2025-06-26 PROCEDURE — 99213 OFFICE O/P EST LOW 20 MIN: CPT

## 2025-06-26 PROCEDURE — 99212 OFFICE O/P EST SF 10 MIN: CPT | Performed by: NURSE PRACTITIONER

## 2025-06-26 PROCEDURE — 99211 OFF/OP EST MAY X REQ PHY/QHP: CPT

## 2025-06-26 PROCEDURE — 99203 OFFICE O/P NEW LOW 30 MIN: CPT

## 2025-06-26 PROCEDURE — 99215 OFFICE O/P EST HI 40 MIN: CPT

## 2025-06-26 PROCEDURE — 99417 PROLNG OP E/M EACH 15 MIN: CPT

## 2025-06-26 RX ORDER — LIDOCAINE HYDROCHLORIDE 40 MG/ML
SOLUTION TOPICAL PRN
Status: DISCONTINUED | OUTPATIENT
Start: 2025-06-26 | End: 2025-06-27 | Stop reason: HOSPADM

## 2025-06-26 RX ORDER — BETAMETHASONE DIPROPIONATE 0.5 MG/G
CREAM TOPICAL PRN
OUTPATIENT
Start: 2025-06-26

## 2025-06-26 RX ORDER — POLYETHYLENE GLYCOL 3350 17 G/17G
17 POWDER, FOR SOLUTION ORAL DAILY PRN
COMMUNITY

## 2025-06-26 RX ORDER — NEOMYCIN/BACITRACIN/POLYMYXINB 3.5-400-5K
OINTMENT (GRAM) TOPICAL PRN
OUTPATIENT
Start: 2025-06-26

## 2025-06-26 RX ORDER — LIDOCAINE HYDROCHLORIDE 20 MG/ML
JELLY TOPICAL PRN
OUTPATIENT
Start: 2025-06-26

## 2025-06-26 RX ORDER — CLOBETASOL PROPIONATE 0.5 MG/G
OINTMENT TOPICAL PRN
OUTPATIENT
Start: 2025-06-26

## 2025-06-26 RX ORDER — LIDOCAINE 40 MG/G
CREAM TOPICAL PRN
OUTPATIENT
Start: 2025-06-26

## 2025-06-26 RX ORDER — GENTAMICIN SULFATE 1 MG/G
OINTMENT TOPICAL PRN
OUTPATIENT
Start: 2025-06-26

## 2025-06-26 RX ORDER — TRIAMCINOLONE ACETONIDE 1 MG/G
OINTMENT TOPICAL PRN
OUTPATIENT
Start: 2025-06-26

## 2025-06-26 RX ORDER — SILVER SULFADIAZINE 10 MG/G
CREAM TOPICAL PRN
OUTPATIENT
Start: 2025-06-26

## 2025-06-26 RX ORDER — BACITRACIN ZINC AND POLYMYXIN B SULFATE 500; 1000 [USP'U]/G; [USP'U]/G
OINTMENT TOPICAL PRN
OUTPATIENT
Start: 2025-06-26

## 2025-06-26 RX ORDER — LIDOCAINE 50 MG/G
OINTMENT TOPICAL PRN
OUTPATIENT
Start: 2025-06-26

## 2025-06-26 RX ORDER — MUPIROCIN 2 %
OINTMENT (GRAM) TOPICAL PRN
OUTPATIENT
Start: 2025-06-26

## 2025-06-26 RX ORDER — GINSENG 100 MG
CAPSULE ORAL PRN
OUTPATIENT
Start: 2025-06-26

## 2025-06-26 RX ORDER — LIDOCAINE HYDROCHLORIDE 40 MG/ML
SOLUTION TOPICAL PRN
OUTPATIENT
Start: 2025-06-26

## 2025-06-26 RX ORDER — SODIUM CHLOR/HYPOCHLOROUS ACID 0.033 %
SOLUTION, IRRIGATION IRRIGATION PRN
OUTPATIENT
Start: 2025-06-26

## 2025-06-26 ASSESSMENT — ENCOUNTER SYMPTOMS
VOMITING: 0
COUGH: 0
DIARRHEA: 0
SHORTNESS OF BREATH: 0
RHINORRHEA: 0
NAUSEA: 0

## 2025-06-26 ASSESSMENT — PAIN SCALES - GENERAL: PAINLEVEL_OUTOF10: 0

## 2025-06-26 NOTE — PROGRESS NOTES
[facility]ound Ostomy Continence Nurse  Initial Ostomy Clinic Visit Note       NAME:  Komal Crawford  MEDICAL RECORD NUMBER:  [id number]  AGE: 84 y.o.   GENDER: female  : 1940  TODAY'S DATE:  2025    Subjective:     Reason for Ostomy referral for eval/treatment: Ostomy pouching concerns      Komal Crawford is a 84 y.o. female referred by:   Self    Type of ostomy: Loop colostomy    Location of ostomy: Left upper quadrant abdomen    Ostomy history: Created 25 by Dr. Sherrie Lawler, planned for diversion due to chronic constipation and bowel incontinence causing buttock wound concerns.    Patient Goal of Care: improve pouching.     Ostomy HPI: The patient was seen today while she was present in the wound care center for a wound visit. She complains of difficulty with pouching her ostomy. She has to replace the pouch daily because it will not stay on. She states having scarring on her abdomen due to having multiple surgeries and this causes difficulty with pouching. She also has some concerns with constipation. The bowel effluent is not soft enough to fall down into the pouch and get stuck pancaking at the top of the pouch. This contributes to the problem with pouch leakage and lifting from her skin. She also admits to a poor appetite and always feeling as if she is full.  Between this and having chronic problems with indigestion, the patient does not eat as well as she should.  She also does not drink much water/fluids and she knows that she needs to try to drink more.  She does currently have home care nursing with [facility] She reports having a fall recently at home and having general weakness. She is not currently working with physical therapy. She also complains of concerns for debility that causes shortness of breath.  Denies any chest pain or tightness. Denies recent fevers or chills.        PAST MEDICAL HISTORY        Diagnosis Date    Acquired absence of both cervix and uterus     Acquired absence of other

## 2025-06-26 NOTE — DISCHARGE INSTRUCTIONS
.      [facility] WOUND and HYPERBARIC TREATMENT CENTER   Ostomy Clinic     Visit Discharge Instructions    DATE- 06/26/2025      HOME CARE AGENCY- [facility]      SUPPLIES ORDERED THROUGH- homecare      SUPPLIES NEEDED-  Skin Tac #[id number]  Coloplast Sensura Luis 2 piece pouching system   -Barrier #[id number]  -pouch #[id number]  Brava Strip Paste # [id number]  Yahaira seal #[id number]  Brava Ostomy Support Belt # [id number]    INSTRUCTIONS-  Use skin tac all around stoma being sure to get immediately around stoma and especially into the medial groove  -Fill the medial groove with strip paste putty (use nearly the whole strip)  -Yahaira seal wrap around stoma   -Make sure the groove appears skin level once filled with strip paste and the Yahaira seal  -Place the barrier plate cut just like the template and turned on it's side  -Moosh all of the putty beneath the pouch to fill the groove and smooth it all in  -Place the pouch onto the barrier  -Do not move, sit still for at least 15 min to allow for adhesion  -Use the stickers in the pouch box to cover the vent (this will help with pancaking)    **Begin taking Miralax daily as needed. Stool consistency should be soft enough to come down into the pouch easily      FOLLOW UP APPOINTMENT FOR OSTOMY CARE- CALL IF NEEDED [phone]      If you need medical attention outside of the business hours of the Wound Care Centers please contact your PCP or go to the nearest emergency room.          Patient Signature:__________________________________________________ DATE__________   Electronically signed by MU Bassett CNP on 6/26/2025 at 10:06 AM

## 2025-06-26 NOTE — DISCHARGE INSTRUCTIONS
[facility] WOUND and HYPERBARIC TREATMENT  CENTER                            Visit  Discharge Instructions / Physician Orders  DATE: 6/16/2025     Home Care:[facility] 2 x per week     SUPPLIES ORDERED THRU: Home care to order supplies due to medicare guidelines                        Wound Location:  Left buttock-healed     Cleanse with: Liquid antibacterial soap and water, rinse well      Dressing Orders:   Triad cream to bottom daily and as needed                         Frequency:   daily and as needed      Additional Orders: Increase protein to diet (meat, cheese, eggs, fish, peanut butter, nuts and beans)  Multivitamin daily   Will measure for compression wraps Juxta fit wraps at next appointment  OFFLOADING [x] YES  TYPE:                  [] NA  GEL cushion (get off amazon)  Weekly wound care visits until determined otherwise.    Have lab work done, in 3 weeks.       Antibiotic therapy-wound care related YES [x] NO [] NA[]  Lotrimin cream around SP cath site daily-stop powder for now  MY CHART []     Smart Device  []      HYPERBARIC TREATMENT-                TREATMENT #                          Your next appointment with the Wound Care Center is in make an appointment in Dr. Conte office for 4 weeks. Please call for appt.                                                                                                   (Please note your next appointment above and if you are unable to keep, kindly give a 24 hour notice. Thank you.)  If more than 15 min late we cannot guarantee you will be seen due to clinician schedule  Per Policy, Excessive cancellation will call for dismissal from program.  If you experience any of the following, please call the Wound Care Center during business hours:  [phone]     * Increase in Pain  * Temperature over 101  * Increase in drainage from your wound  * Drainage with a foul odor  * Bleeding  * Increase in swelling  * Need for compression bandage changes due to slippage,

## 2025-06-26 NOTE — PROGRESS NOTES
[facility] Wound Care Center   Progress Note and Procedure Note      Komal Crawford  MEDICAL RECORD NUMBER:  [id number]  AGE: 84 y.o.   GENDER: female  : 1940  EPISODE DATE:  2025    Subjective:     Chief Complaint   Patient presents with    Wound Check     Left knee         HISTORY of PRESENT ILLNESS HPI     Komal Crawford is a 84 y.o. female who presents today for wound/ulcer evaluation.   History of Wound Context: presents in follow up on left buttock wound that remained healed. She is following with Dr Arteaga for chronic suppression due to infection of left knee. She will need to follow up in Dr Arteaga's office for this in one month. Will order labs for her to complete prior to that appointment.   Wound/Ulcer Pain Timing/Severity: none  Quality of pain: N/A  Severity:  0 / 10   Modifying Factors: None  Associated Signs/Symptoms: none    Ulcer Identification:  Ulcer Type: no open wounds          PAST MEDICAL HISTORY        Diagnosis Date    Acquired absence of both cervix and uterus     Acquired absence of other organs     Age-related cognitive decline     Age-related osteoporosis without current pathological fracture     Ankle pain     Left ankle fracture in ortho boat.    Anxiety     At risk for falling     Atherosclerotic heart disease of native coronary artery without angina pectoris     2 stents- In mid RCA  Dr. 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 Lynch- pulmonology    Caffeine use     2 coffee/day, 1-2 tea per week    Cataract extraction status of eye, left     Cataract extraction status of right eye     Cellulitis of left lower limb     Cerebral artery occlusion with cerebral infarction (HCC)     [id number]    Chronic anticoagulation     Chronic back pain     Chronic ITP (idiopathic

## 2025-06-27 ENCOUNTER — CARE COORDINATION (OUTPATIENT)
Dept: CARE COORDINATION | Age: 85
End: 2025-06-27

## 2025-06-27 DIAGNOSIS — Z86.73 HISTORY OF STROKE: ICD-10-CM

## 2025-06-27 RX ORDER — CLOPIDOGREL BISULFATE 75 MG/1
TABLET ORAL
Qty: 30 TABLET | Refills: 4 | Status: SHIPPED | OUTPATIENT
Start: 2025-06-27

## 2025-06-27 RX ORDER — GABAPENTIN 100 MG/1
CAPSULE ORAL
Qty: 30 CAPSULE | Refills: 4 | Status: SHIPPED | OUTPATIENT
Start: 2025-06-27 | End: 2025-09-25

## 2025-06-27 NOTE — CARE COORDINATION
Per ACM request, Attempted to reach patient for continued Care Coordination follow up.  Patient was unavailable at the time of my call. Unable to leave a message due to VM box full.    Incoming call from patient returning my call.   Per AC request,reviewed appointments with patient.  Missed her ortho appointment and states she is working with Yadira Castorena @ [facility] to work out appointment dates and transportation.  Patient states she speaks to Yadira Castorena a couple times per week.  Patient states she has a podiatrist @ [facility] in [place] on [address] and will continue to see.  She states she is working on appointment and transport with Yadira for podiatry. Patient states she is doing good. I advised patient to contact PCP office if needed.  No further needs at this time. Will advise ACM   CC f/u one week

## 2025-07-01 DIAGNOSIS — Z86.73 HISTORY OF STROKE: ICD-10-CM

## 2025-07-01 DIAGNOSIS — G25.81 RESTLESS LEGS SYNDROME: ICD-10-CM

## 2025-07-01 RX ORDER — PANTOPRAZOLE SODIUM 40 MG/1
TABLET, DELAYED RELEASE ORAL
Qty: 30 TABLET | Refills: 2 | Status: SHIPPED | OUTPATIENT
Start: 2025-07-01

## 2025-07-01 RX ORDER — RANOLAZINE 500 MG/1
TABLET, EXTENDED RELEASE ORAL
Qty: 90 TABLET | Refills: 2 | Status: SHIPPED | OUTPATIENT
Start: 2025-07-01

## 2025-07-01 RX ORDER — ATORVASTATIN CALCIUM 10 MG/1
10 TABLET, FILM COATED ORAL DAILY
Qty: 30 TABLET | Refills: 2 | Status: SHIPPED | OUTPATIENT
Start: 2025-07-01

## 2025-07-01 RX ORDER — GLIPIZIDE 5 MG/1
TABLET ORAL
Qty: 30 TABLET | Refills: 2 | Status: SHIPPED | OUTPATIENT
Start: 2025-07-01

## 2025-07-01 RX ORDER — ROPINIROLE 4 MG/1
TABLET, FILM COATED ORAL
Qty: 30 TABLET | Refills: 2 | Status: SHIPPED | OUTPATIENT
Start: 2025-07-01

## 2025-07-10 ENCOUNTER — CARE COORDINATION (OUTPATIENT)
Dept: CARE COORDINATION | Age: 85
End: 2025-07-10

## 2025-07-10 NOTE — CARE COORDINATION
Ambulatory Care Coordination Note     7/10/2025 1:55 PM     Patient outreach attempt by this ACM today to offer care management services. ACM was unable to reach the patient by telephone today;   left voice message requesting a return phone call to this ACM.     ACM: Tia Velazquez RN     Care Summary Note:     Writer phoned Patient for ACM update.  Message left on her voice mail requesting return call.  Writer's contact information provided.    PCP/Specialist follow up:   Future Appointments         Provider Specialty Dept Phone    7/23/2025 9:15 AM David rAteaga MD Infectious Diseases [phone]    9/12/2025 8:30 AM Geno Mcdonnell PA-C Primary Care [phone]            Follow Up:   Plan for next AC outreach in approximately 1 week to complete:  - disease specific assessments  - SDOH assessments  - medication review  - advance care planning  - goal progression  - education .

## 2025-07-22 ENCOUNTER — CARE COORDINATION (OUTPATIENT)
Dept: CARE COORDINATION | Age: 85
End: 2025-07-22

## 2025-07-22 NOTE — CARE COORDINATION
Ambulatory Care Coordination Note     7/22/2025 5:00 PM     Patient outreach attempt by this AC today to perform care management follow up . AC was unable to reach the patient by telephone today;   left voice message requesting a return phone call to this ACM.     ACM: Tia Velazquez RN     Care Summary Note:     CC Plan:       Review upcoming appointments with Patient and check the status of transportation.  Patient missed her Ortho appointment on 6/23/2025 for ED follow up.  Appointment needs to be rescheduled.    PCP/Specialist follow up:   Future Appointments         Provider Specialty Dept Phone    7/23/2025 9:15 AM David Arteaga MD Infectious Diseases [phone]    9/12/2025 8:30 AM Geno Mcdonnell PA-C Primary Care [phone]          2.  Patient is under [facility] for care of colostomy and left knee and left buttock wounds.    3.  Does [facility] continue to visit Patient?    4.  Fall precautions; Patient is wheelchair dependent    Writer phoned Patient for ACM update and to discuss cc plan of care.  Message left on Patient's voice mail requesting return call.  Writer's contact information provided.    Follow Up:   Plan for next ACM outreach in approximately 1 week to complete:  - disease specific assessments  - SDOH assessments  - medication review  - advance care planning  - goal progression  - education .

## 2025-07-23 ENCOUNTER — OFFICE VISIT (OUTPATIENT)
Dept: INFECTIOUS DISEASES | Age: 85
End: 2025-07-23
Payer: MEDICARE

## 2025-07-23 ENCOUNTER — HOSPITAL ENCOUNTER (OUTPATIENT)
Age: 85
Discharge: HOME | End: 2025-07-23
Payer: MEDICARE

## 2025-07-23 VITALS
BODY MASS INDEX: 32.36 KG/M2 | DIASTOLIC BLOOD PRESSURE: 84 MMHG | HEIGHT: 57 IN | HEART RATE: 65 BPM | OXYGEN SATURATION: 96 % | WEIGHT: 150 LBS | SYSTOLIC BLOOD PRESSURE: 142 MMHG

## 2025-07-23 DIAGNOSIS — T84.54XS INFECTION OF TOTAL LEFT KNEE REPLACEMENT, SEQUELA: ICD-10-CM

## 2025-07-23 DIAGNOSIS — T84.54XS INFECTION OF TOTAL LEFT KNEE REPLACEMENT, SEQUELA: Primary | ICD-10-CM

## 2025-07-23 LAB
BUN SERPL-MCNC: 19 MG/DL (ref 8–23)
CREAT SERPL-MCNC: 0.7 MG/DL (ref 0.7–1.2)
CRP SERPL HS-MCNC: 6.9 MG/L (ref 0–5)
ERYTHROCYTE [DISTWIDTH] IN BLOOD BY AUTOMATED COUNT: 15.1 % (ref 11.5–14.9)
GFR, ESTIMATED: 86 ML/MIN/1.73M2
HCT VFR BLD AUTO: 36.9 % (ref 36–46)
HGB BLD-MCNC: 11.2 G/DL (ref 12–16)
MCH RBC QN AUTO: 29 PG (ref 26–34)
MCHC RBC AUTO-ENTMCNC: 30.4 G/DL (ref 31–37)
MCV RBC AUTO: 95.6 FL (ref 80–100)
NRBC BLD-RTO: 0 PER 100 WBC
PLATELET # BLD AUTO: ABNORMAL K/UL (ref 150–450)
PLATELET, FLUORESCENCE: 158 K/UL (ref 150–450)
PLATELETS.RETICULATED NFR BLD AUTO: 15.4 % (ref 1.1–10.3)
PMV BLD AUTO: 14.4 FL (ref 8–13.5)
RBC # BLD AUTO: 3.86 M/UL (ref 3.95–5.11)
WBC OTHER # BLD: 6.4 K/UL (ref 3.5–11)

## 2025-07-23 PROCEDURE — 82565 ASSAY OF CREATININE: CPT

## 2025-07-23 PROCEDURE — 3077F SYST BP >= 140 MM HG: CPT | Performed by: INTERNAL MEDICINE

## 2025-07-23 PROCEDURE — 36415 COLL VENOUS BLD VENIPUNCTURE: CPT

## 2025-07-23 PROCEDURE — G8417 CALC BMI ABV UP PARAM F/U: HCPCS | Performed by: INTERNAL MEDICINE

## 2025-07-23 PROCEDURE — 1123F ACP DISCUSS/DSCN MKR DOCD: CPT | Performed by: INTERNAL MEDICINE

## 2025-07-23 PROCEDURE — 3079F DIAST BP 80-89 MM HG: CPT | Performed by: INTERNAL MEDICINE

## 2025-07-23 PROCEDURE — 99214 OFFICE O/P EST MOD 30 MIN: CPT | Performed by: INTERNAL MEDICINE

## 2025-07-23 PROCEDURE — 1036F TOBACCO NON-USER: CPT | Performed by: INTERNAL MEDICINE

## 2025-07-23 PROCEDURE — 85027 COMPLETE CBC AUTOMATED: CPT

## 2025-07-23 PROCEDURE — G8427 DOCREV CUR MEDS BY ELIG CLIN: HCPCS | Performed by: INTERNAL MEDICINE

## 2025-07-23 PROCEDURE — G8400 PT W/DXA NO RESULTS DOC: HCPCS | Performed by: INTERNAL MEDICINE

## 2025-07-23 PROCEDURE — 86140 C-REACTIVE PROTEIN: CPT

## 2025-07-23 PROCEDURE — 1159F MED LIST DOCD IN RCRD: CPT | Performed by: INTERNAL MEDICINE

## 2025-07-23 PROCEDURE — 1090F PRES/ABSN URINE INCON ASSESS: CPT | Performed by: INTERNAL MEDICINE

## 2025-07-23 PROCEDURE — 84520 ASSAY OF UREA NITROGEN: CPT

## 2025-07-23 NOTE — PROGRESS NOTES
unspecified cervical region     Other chronic pain     Other hemorrhoids     Other malaise     Other specified disorders of bladder     suprapubic catheter - Oct 2024    Pain in left ankle and joints of left foot     Personal history of colonic polyps     Personal history of healed osteoporosis fracture     Personal history of nicotine dependence     Personal history of transient ischemic attack (TIA), and cerebral infarction without residual deficits     Personal history of urinary (tract) infections     Polyosteoarthritis, unspecified     Presence of artificial hip joint, bilateral     Presence of artificial knee joint, bilateral     Presence of coronary angioplasty implant and graft     Presence of intraocular lens     Presence of urogenital implant     Pressure ulcer of left buttock, stage 2 (HCC)     Pressure ulcer of right buttock, stage 2 (HCC)     Pressure ulcer of right heel, stage 1     wearing brace to left ankle-foot    Pure hypercholesterolemia, unspecified     Rectal prolapse     Rectal prolapse     w pelvic floor weakness    Resistance to multiple antibiotics     Retention of urine, unspecified     Ringing in ears     Self-catheterizes urinary bladder     6-7 times daily    Sepsis, unspecified organism (HCC)     TIA (transient ischemic attack) 2000    x1    Tinea corporis     Tinnitus, unspecified ear     Tubular adenoma     colon polyps    Type 2 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 Robbins- Endocrinology    Under care of team     cardiology- Dr. Peña    Unspecified open wound, left lower leg, subsequent encounter     Unsteadiness on feet     Urinary incontinence     frequent UTI s/p infection,

## 2025-07-24 ENCOUNTER — TELEPHONE (OUTPATIENT)
Dept: INFECTIOUS DISEASES | Age: 85
End: 2025-07-24

## 2025-07-24 NOTE — PATIENT INSTRUCTIONS
Spoke with patient and got 6 month follow up scheduled.  Patient already completed labs message was sent to Dr Arteaga for review 07/24/25CL

## 2025-07-24 NOTE — TELEPHONE ENCOUNTER
Spoke with patient to get 6 month follow up scheduled and make sure that patient had enough antibiotic to last her for a while and patient is wanting the results of the labs that she had done yesterday.  Please review and advise

## 2025-07-29 ENCOUNTER — TELEPHONE (OUTPATIENT)
Age: 85
End: 2025-07-29

## 2025-07-29 ENCOUNTER — APPOINTMENT (OUTPATIENT)
Dept: ULTRASOUND IMAGING | Age: 85
DRG: 690 | End: 2025-07-29
Payer: MEDICARE

## 2025-07-29 ENCOUNTER — HOSPITAL ENCOUNTER (INPATIENT)
Age: 85
LOS: 4 days | Discharge: HOME HEALTH | DRG: 690 | End: 2025-08-02
Attending: EMERGENCY MEDICINE | Admitting: INTERNAL MEDICINE
Payer: MEDICARE

## 2025-07-29 DIAGNOSIS — N30.01 ACUTE CYSTITIS WITH HEMATURIA: Primary | ICD-10-CM

## 2025-07-29 PROBLEM — R31.9 HEMATURIA: Status: ACTIVE | Noted: 2025-07-29

## 2025-07-29 LAB
ALBUMIN SERPL-MCNC: 3.7 G/DL (ref 3.5–5.2)
ALP SERPL-CCNC: 83 U/L (ref 35–104)
ALT SERPL-CCNC: 12 U/L (ref 10–35)
ANION GAP SERPL CALCULATED.3IONS-SCNC: 12 MMOL/L (ref 9–16)
AST SERPL-CCNC: 12 U/L (ref 10–35)
BACTERIA URNS QL MICRO: ABNORMAL
BASOPHILS # BLD: 0.06 K/UL (ref 0–0.2)
BASOPHILS NFR BLD: 1 % (ref 0–2)
BILIRUB SERPL-MCNC: 0.4 MG/DL (ref 0–1.2)
BILIRUB UR QL STRIP: ABNORMAL
BUN SERPL-MCNC: 18 MG/DL (ref 8–23)
CALCIUM SERPL-MCNC: 9.3 MG/DL (ref 8.6–10.4)
CASTS #/AREA URNS LPF: ABNORMAL /LPF
CHLORIDE SERPL-SCNC: 106 MMOL/L (ref 98–107)
CLARITY UR: ABNORMAL
CO2 SERPL-SCNC: 23 MMOL/L (ref 20–31)
COLOR UR: ABNORMAL
CREAT SERPL-MCNC: 0.8 MG/DL (ref 0.7–1.2)
EOSINOPHIL # BLD: 0.13 K/UL (ref 0–0.44)
EOSINOPHILS RELATIVE PERCENT: 2 % (ref 0–4)
EPI CELLS #/AREA URNS HPF: ABNORMAL /HPF
ERYTHROCYTE [DISTWIDTH] IN BLOOD BY AUTOMATED COUNT: 15.4 % (ref 11.5–14.9)
GFR, ESTIMATED: 73 ML/MIN/1.73M2
GLUCOSE BLD-MCNC: 171 MG/DL (ref 65–105)
GLUCOSE BLD-MCNC: 222 MG/DL (ref 65–105)
GLUCOSE SERPL-MCNC: 153 MG/DL (ref 74–99)
GLUCOSE UR STRIP-MCNC: NEGATIVE MG/DL
HCT VFR BLD AUTO: 29.4 % (ref 36–46)
HCT VFR BLD AUTO: 33 % (ref 36–46)
HGB BLD-MCNC: 10.3 G/DL (ref 12–16)
HGB BLD-MCNC: 9.2 G/DL (ref 12–16)
HGB UR QL STRIP.AUTO: ABNORMAL
IMM GRANULOCYTES # BLD AUTO: 0.03 K/UL (ref 0–0.3)
IMM GRANULOCYTES NFR BLD: 0 %
INR PPP: 1
KETONES UR STRIP-MCNC: NEGATIVE MG/DL
LEUKOCYTE ESTERASE UR QL STRIP: ABNORMAL
LYMPHOCYTES NFR BLD: 1.25 K/UL (ref 1.1–3.7)
LYMPHOCYTES RELATIVE PERCENT: 16 % (ref 24–44)
MCH RBC QN AUTO: 29.2 PG (ref 26–34)
MCHC RBC AUTO-ENTMCNC: 31.3 G/DL (ref 31–37)
MCV RBC AUTO: 93.3 FL (ref 80–100)
MONOCYTES NFR BLD: 0.53 K/UL (ref 0.1–1.2)
MONOCYTES NFR BLD: 7 % (ref 3–12)
NEUTROPHILS NFR BLD: 74 % (ref 36–66)
NEUTS SEG NFR BLD: 6.09 K/UL (ref 1.5–8.1)
NITRITE UR QL STRIP: NEGATIVE
NRBC BLD-RTO: 0 PER 100 WBC
PH UR STRIP: 5.5 [PH] (ref 5–8)
PLATELET # BLD AUTO: ABNORMAL K/UL (ref 150–450)
PLATELET, FLUORESCENCE: 169 K/UL (ref 150–450)
PLATELETS.RETICULATED NFR BLD AUTO: 14.5 % (ref 1.1–10.3)
PMV BLD AUTO: 14.1 FL (ref 8–13.5)
POTASSIUM SERPL-SCNC: 4 MMOL/L (ref 3.7–5.3)
PROT SERPL-MCNC: 6.5 G/DL (ref 6.6–8.7)
PROT UR STRIP-MCNC: ABNORMAL MG/DL
PROTHROMBIN TIME: 14.4 SEC (ref 11.8–14.6)
RBC # BLD AUTO: 3.15 M/UL (ref 3.95–5.11)
RBC #/AREA URNS HPF: ABNORMAL /HPF
SODIUM SERPL-SCNC: 141 MMOL/L (ref 136–145)
SP GR UR STRIP: 1.02 (ref 1–1.03)
UROBILINOGEN UR STRIP-ACNC: NORMAL EU/DL (ref 0–1)
WBC #/AREA URNS HPF: ABNORMAL /HPF
WBC OTHER # BLD: 8.1 K/UL (ref 3.5–11)

## 2025-07-29 PROCEDURE — 87181 SC STD AGAR DILUTION PER AGT: CPT

## 2025-07-29 PROCEDURE — 2500000003 HC RX 250 WO HCPCS

## 2025-07-29 PROCEDURE — 99223 1ST HOSP IP/OBS HIGH 75: CPT | Performed by: INTERNAL MEDICINE

## 2025-07-29 PROCEDURE — 76775 US EXAM ABDO BACK WALL LIM: CPT

## 2025-07-29 PROCEDURE — 6360000002 HC RX W HCPCS: Performed by: EMERGENCY MEDICINE

## 2025-07-29 PROCEDURE — 82947 ASSAY GLUCOSE BLOOD QUANT: CPT

## 2025-07-29 PROCEDURE — 99285 EMERGENCY DEPT VISIT HI MDM: CPT

## 2025-07-29 PROCEDURE — 81001 URINALYSIS AUTO W/SCOPE: CPT

## 2025-07-29 PROCEDURE — 2580000003 HC RX 258

## 2025-07-29 PROCEDURE — 87184 SC STD DISK METHOD PER PLATE: CPT

## 2025-07-29 PROCEDURE — 87186 SC STD MICRODIL/AGAR DIL: CPT

## 2025-07-29 PROCEDURE — 87086 URINE CULTURE/COLONY COUNT: CPT

## 2025-07-29 PROCEDURE — 85014 HEMATOCRIT: CPT

## 2025-07-29 PROCEDURE — 6360000002 HC RX W HCPCS

## 2025-07-29 PROCEDURE — 6370000000 HC RX 637 (ALT 250 FOR IP)

## 2025-07-29 PROCEDURE — 87040 BLOOD CULTURE FOR BACTERIA: CPT

## 2025-07-29 PROCEDURE — 96365 THER/PROPH/DIAG IV INF INIT: CPT

## 2025-07-29 PROCEDURE — 36415 COLL VENOUS BLD VENIPUNCTURE: CPT

## 2025-07-29 PROCEDURE — 1200000000 HC SEMI PRIVATE

## 2025-07-29 PROCEDURE — 6370000000 HC RX 637 (ALT 250 FOR IP): Performed by: EMERGENCY MEDICINE

## 2025-07-29 PROCEDURE — 85025 COMPLETE CBC W/AUTO DIFF WBC: CPT

## 2025-07-29 PROCEDURE — 85610 PROTHROMBIN TIME: CPT

## 2025-07-29 PROCEDURE — 87077 CULTURE AEROBIC IDENTIFY: CPT

## 2025-07-29 PROCEDURE — 2580000003 HC RX 258: Performed by: EMERGENCY MEDICINE

## 2025-07-29 PROCEDURE — 80053 COMPREHEN METABOLIC PANEL: CPT

## 2025-07-29 PROCEDURE — 85018 HEMOGLOBIN: CPT

## 2025-07-29 RX ORDER — METOPROLOL SUCCINATE 25 MG/1
12.5 TABLET, EXTENDED RELEASE ORAL DAILY
Status: DISCONTINUED | OUTPATIENT
Start: 2025-07-29 | End: 2025-08-02 | Stop reason: HOSPADM

## 2025-07-29 RX ORDER — ACETAMINOPHEN 650 MG/1
650 SUPPOSITORY RECTAL EVERY 6 HOURS PRN
Status: DISCONTINUED | OUTPATIENT
Start: 2025-07-29 | End: 2025-08-02 | Stop reason: HOSPADM

## 2025-07-29 RX ORDER — SODIUM CHLORIDE 9 MG/ML
INJECTION, SOLUTION INTRAVENOUS PRN
Status: DISCONTINUED | OUTPATIENT
Start: 2025-07-29 | End: 2025-08-02 | Stop reason: HOSPADM

## 2025-07-29 RX ORDER — SODIUM CHLORIDE 0.9 % (FLUSH) 0.9 %
5-40 SYRINGE (ML) INJECTION EVERY 12 HOURS SCHEDULED
Status: DISCONTINUED | OUTPATIENT
Start: 2025-07-29 | End: 2025-08-02 | Stop reason: HOSPADM

## 2025-07-29 RX ORDER — HYDROCODONE BITARTRATE AND ACETAMINOPHEN 5; 325 MG/1; MG/1
1 TABLET ORAL EVERY 6 HOURS PRN
Status: DISCONTINUED | OUTPATIENT
Start: 2025-07-29 | End: 2025-08-02 | Stop reason: HOSPADM

## 2025-07-29 RX ORDER — CALCIUM CARBONATE 500 MG/1
500 TABLET, CHEWABLE ORAL 3 TIMES DAILY PRN
Status: DISCONTINUED | OUTPATIENT
Start: 2025-07-29 | End: 2025-08-02 | Stop reason: HOSPADM

## 2025-07-29 RX ORDER — MAGNESIUM SULFATE HEPTAHYDRATE 40 MG/ML
2000 INJECTION, SOLUTION INTRAVENOUS PRN
Status: DISCONTINUED | OUTPATIENT
Start: 2025-07-29 | End: 2025-08-02 | Stop reason: HOSPADM

## 2025-07-29 RX ORDER — DEXTROSE MONOHYDRATE 100 MG/ML
INJECTION, SOLUTION INTRAVENOUS CONTINUOUS PRN
Status: DISCONTINUED | OUTPATIENT
Start: 2025-07-29 | End: 2025-08-02 | Stop reason: HOSPADM

## 2025-07-29 RX ORDER — POLYETHYLENE GLYCOL 3350 17 G/17G
17 POWDER, FOR SOLUTION ORAL DAILY PRN
Status: DISCONTINUED | OUTPATIENT
Start: 2025-07-29 | End: 2025-08-02 | Stop reason: HOSPADM

## 2025-07-29 RX ORDER — METOPROLOL SUCCINATE 25 MG/1
25 TABLET, EXTENDED RELEASE ORAL DAILY
Status: CANCELLED | OUTPATIENT
Start: 2025-07-29

## 2025-07-29 RX ORDER — POTASSIUM CHLORIDE 1500 MG/1
40 TABLET, EXTENDED RELEASE ORAL PRN
Status: DISCONTINUED | OUTPATIENT
Start: 2025-07-29 | End: 2025-08-02 | Stop reason: HOSPADM

## 2025-07-29 RX ORDER — TIZANIDINE 2 MG/1
2 TABLET ORAL EVERY 8 HOURS PRN
Status: CANCELLED | OUTPATIENT
Start: 2025-07-29

## 2025-07-29 RX ORDER — ROPINIROLE 2 MG/1
4 TABLET, FILM COATED ORAL NIGHTLY
Status: DISCONTINUED | OUTPATIENT
Start: 2025-07-29 | End: 2025-08-02 | Stop reason: HOSPADM

## 2025-07-29 RX ORDER — POTASSIUM CHLORIDE 7.45 MG/ML
10 INJECTION INTRAVENOUS PRN
Status: DISCONTINUED | OUTPATIENT
Start: 2025-07-29 | End: 2025-08-02 | Stop reason: HOSPADM

## 2025-07-29 RX ORDER — ONDANSETRON 2 MG/ML
4 INJECTION INTRAMUSCULAR; INTRAVENOUS EVERY 6 HOURS PRN
Status: DISCONTINUED | OUTPATIENT
Start: 2025-07-29 | End: 2025-08-02 | Stop reason: HOSPADM

## 2025-07-29 RX ORDER — ATORVASTATIN CALCIUM 10 MG/1
10 TABLET, FILM COATED ORAL DAILY
Status: DISCONTINUED | OUTPATIENT
Start: 2025-07-29 | End: 2025-08-02 | Stop reason: HOSPADM

## 2025-07-29 RX ORDER — PHENAZOPYRIDINE HYDROCHLORIDE 100 MG/1
100 TABLET, FILM COATED ORAL ONCE
Status: COMPLETED | OUTPATIENT
Start: 2025-07-29 | End: 2025-07-29

## 2025-07-29 RX ORDER — ACETAMINOPHEN 325 MG/1
650 TABLET ORAL EVERY 6 HOURS PRN
Status: DISCONTINUED | OUTPATIENT
Start: 2025-07-29 | End: 2025-08-02 | Stop reason: HOSPADM

## 2025-07-29 RX ORDER — PANTOPRAZOLE SODIUM 20 MG/1
20 TABLET, DELAYED RELEASE ORAL
Status: DISCONTINUED | OUTPATIENT
Start: 2025-07-30 | End: 2025-08-02 | Stop reason: HOSPADM

## 2025-07-29 RX ORDER — TROSPIUM CHLORIDE 20 MG/1
20 TABLET, FILM COATED ORAL NIGHTLY
Status: DISCONTINUED | OUTPATIENT
Start: 2025-07-29 | End: 2025-08-02 | Stop reason: HOSPADM

## 2025-07-29 RX ORDER — CLOPIDOGREL BISULFATE 75 MG/1
75 TABLET ORAL DAILY
Status: DISCONTINUED | OUTPATIENT
Start: 2025-07-29 | End: 2025-08-02 | Stop reason: HOSPADM

## 2025-07-29 RX ORDER — FUROSEMIDE 20 MG/1
20 TABLET ORAL DAILY
Status: DISCONTINUED | OUTPATIENT
Start: 2025-07-29 | End: 2025-08-02 | Stop reason: HOSPADM

## 2025-07-29 RX ORDER — INSULIN LISPRO 100 [IU]/ML
0-4 INJECTION, SOLUTION INTRAVENOUS; SUBCUTANEOUS
Status: DISCONTINUED | OUTPATIENT
Start: 2025-07-29 | End: 2025-08-02 | Stop reason: HOSPADM

## 2025-07-29 RX ORDER — ASPIRIN 81 MG/1
81 TABLET, CHEWABLE ORAL DAILY
Status: CANCELLED | OUTPATIENT
Start: 2025-07-29

## 2025-07-29 RX ORDER — GABAPENTIN 100 MG/1
100 CAPSULE ORAL NIGHTLY
Status: DISCONTINUED | OUTPATIENT
Start: 2025-07-29 | End: 2025-08-02 | Stop reason: HOSPADM

## 2025-07-29 RX ORDER — ONDANSETRON 4 MG/1
4 TABLET, ORALLY DISINTEGRATING ORAL EVERY 8 HOURS PRN
Status: DISCONTINUED | OUTPATIENT
Start: 2025-07-29 | End: 2025-08-02 | Stop reason: HOSPADM

## 2025-07-29 RX ORDER — DOXYCYCLINE 100 MG/1
100 CAPSULE ORAL 2 TIMES DAILY
Status: DISCONTINUED | OUTPATIENT
Start: 2025-07-29 | End: 2025-07-29

## 2025-07-29 RX ORDER — DOXYCYCLINE 100 MG/1
100 CAPSULE ORAL 2 TIMES DAILY
Status: CANCELLED | OUTPATIENT
Start: 2025-07-29

## 2025-07-29 RX ORDER — SODIUM CHLORIDE 0.9 % (FLUSH) 0.9 %
5-40 SYRINGE (ML) INJECTION PRN
Status: DISCONTINUED | OUTPATIENT
Start: 2025-07-29 | End: 2025-08-02 | Stop reason: HOSPADM

## 2025-07-29 RX ORDER — CLOPIDOGREL BISULFATE 75 MG/1
75 TABLET ORAL DAILY
Status: CANCELLED | OUTPATIENT
Start: 2025-07-29

## 2025-07-29 RX ADMIN — ROPINIROLE HYDROCHLORIDE 4 MG: 2 TABLET, FILM COATED ORAL at 20:22

## 2025-07-29 RX ADMIN — MEROPENEM 1000 MG: 1 INJECTION INTRAVENOUS at 20:15

## 2025-07-29 RX ADMIN — MEROPENEM 1000 MG: 1 INJECTION INTRAVENOUS at 12:06

## 2025-07-29 RX ADMIN — INSULIN LISPRO 1 UNITS: 100 INJECTION, SOLUTION INTRAVENOUS; SUBCUTANEOUS at 20:12

## 2025-07-29 RX ADMIN — TROSPIUM CHLORIDE 20 MG: 20 TABLET, FILM COATED ORAL at 21:38

## 2025-07-29 RX ADMIN — HYDROMORPHONE HYDROCHLORIDE 1 MG: 1 INJECTION, SOLUTION INTRAMUSCULAR; INTRAVENOUS; SUBCUTANEOUS at 13:22

## 2025-07-29 RX ADMIN — HYDROMORPHONE HYDROCHLORIDE 0.5 MG: 1 INJECTION, SOLUTION INTRAMUSCULAR; INTRAVENOUS; SUBCUTANEOUS at 16:18

## 2025-07-29 RX ADMIN — PHENAZOPYRIDINE 100 MG: 100 TABLET ORAL at 12:07

## 2025-07-29 RX ADMIN — SODIUM CHLORIDE, PRESERVATIVE FREE 5 ML: 5 INJECTION INTRAVENOUS at 20:13

## 2025-07-29 RX ADMIN — HYDROCODONE BITARTRATE AND ACETAMINOPHEN 1 TABLET: 5; 325 TABLET ORAL at 14:23

## 2025-07-29 RX ADMIN — ANTACID TABLETS 500 MG: 500 TABLET, CHEWABLE ORAL at 20:12

## 2025-07-29 RX ADMIN — GABAPENTIN 100 MG: 100 CAPSULE ORAL at 20:12

## 2025-07-29 RX ADMIN — HYDROCODONE BITARTRATE AND ACETAMINOPHEN 1 TABLET: 5; 325 TABLET ORAL at 23:19

## 2025-07-29 ASSESSMENT — PAIN SCALES - GENERAL
PAINLEVEL_OUTOF10: 8
PAINLEVEL_OUTOF10: 9
PAINLEVEL_OUTOF10: 2
PAINLEVEL_OUTOF10: 0
PAINLEVEL_OUTOF10: 3
PAINLEVEL_OUTOF10: 7
PAINLEVEL_OUTOF10: 0
PAINLEVEL_OUTOF10: 8

## 2025-07-29 ASSESSMENT — ENCOUNTER SYMPTOMS
COUGH: 0
DIARRHEA: 0
VOMITING: 0
CONSTIPATION: 0
BLOOD IN STOOL: 1
NAUSEA: 0
ABDOMINAL PAIN: 1

## 2025-07-29 ASSESSMENT — PAIN DESCRIPTION - ORIENTATION
ORIENTATION: MID
ORIENTATION: MID
ORIENTATION: LOWER

## 2025-07-29 ASSESSMENT — PAIN - FUNCTIONAL ASSESSMENT
PAIN_FUNCTIONAL_ASSESSMENT: ACTIVITIES ARE NOT PREVENTED
PAIN_FUNCTIONAL_ASSESSMENT: 0-10
PAIN_FUNCTIONAL_ASSESSMENT: PREVENTS OR INTERFERES SOME ACTIVE ACTIVITIES AND ADLS

## 2025-07-29 ASSESSMENT — PAIN DESCRIPTION - LOCATION
LOCATION: ABDOMEN

## 2025-07-29 ASSESSMENT — PAIN DESCRIPTION - DESCRIPTORS
DESCRIPTORS: SPASM

## 2025-07-29 NOTE — ED NOTES
Report given to LETY Ramsay from Med Surg.   Report method by phone   The following was reviewed with receiving RN:   Current vital signs:  /60   Pulse 79   Temp 98 °F (36.7 °C) (Oral)   Resp 24   Ht 1.448 m (4' 9\")   Wt 68 kg (150 lb)   SpO2 93%   BMI 32.46 kg/m²                MEWS Score: 1     Any medication or safety alerts were reviewed. Any pending diagnostics and notifications were also reviewed, as well as any safety concerns or issues, abnormal labs, abnormal imaging, and abnormal assessment findings. Questions were answered.
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
## 2025-07-29 NOTE — PLAN OF CARE
Problem: Chronic Conditions and Co-morbidities  Goal: Patient's chronic conditions and co-morbidity symptoms are monitored and maintained or improved  Outcome: Progressing  Flowsheets (Taken 7/29/2025 1712)  Care Plan - Patient's Chronic Conditions and Co-Morbidity Symptoms are Monitored and Maintained or Improved: Monitor and assess patient's chronic conditions and comorbid symptoms for stability, deterioration, or improvement     Problem: Discharge Planning  Goal: Discharge to home or other facility with appropriate resources  Outcome: Progressing  Flowsheets (Taken 7/29/2025 1712)  Discharge to home or other facility with appropriate resources:   Identify barriers to discharge with patient and caregiver   Arrange for needed discharge resources and transportation as appropriate   Identify discharge learning needs (meds, wound care, etc)     Problem: Safety - Adult  Goal: Free from fall injury  Outcome: Progressing  Flowsheets (Taken 7/29/2025 1439)  Free From Fall Injury: Instruct family/caregiver on patient safety

## 2025-07-29 NOTE — PROGRESS NOTES
Physical Therapy        Physical Therapy Cancel Note      DATE: 2025    NAME: Komal Crawford  MRN: [id number]   : 1940      Patient not seen this date for Physical Therapy due to:    Patient Declined: due to pain levels with bladder spasms. Pt agreeable to attempt tomorrow. Will continue to follow      Electronically signed by Vivian Bucio PT on 2025 at 3:57 PM
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
## 2025-07-29 NOTE — TELEPHONE ENCOUNTER
PATIENTS NURSE CALLED FROM [facility] AND SAID THAT YESTERDAY AND NOW CONTINUING INTO TODAY COLLIN IS HAVING BLOOD IN HER URINE. NO PAIN, JUST BLADDER SPASMS. THE NURSE CHANGED HER CATHETER YESTERDAY AND GOT A BLOOD CLOT ON THE END OF THE CATHETER. NOW, ALL SHE IS GETTING IN THE URINE BAG IS BLOODY (PINK) URINE. PATIENT'S NURSE WAS ADVISED FOR PATIENT TO GO TO THE HOSPITAL IF THE BLOOD DOES NOT STOP. SHE WOULD USUALLY DO A GENTAMICIN FLUSH , BUT WANTS TO WAIT ON WHAT YOU HAVE TO SAY.  PLEASE ADVISE.

## 2025-07-29 NOTE — PROGRESS NOTES
Pharmacy Medication History Note      List of current medications patient is taking is complete.    Source of information: Sure Scripts, Care Everywhere, OARRS     Changes made to medication list:  Medications removed (include reason, ex. therapy complete or physician discontinued, noncompliance):  None     Medications flagged for provider review:  Clindamycin - Most recent ID notes do not state patient is still taking  Midodrine - last filled in Dec 2024 for 10 days   Omeprazole - switched to pantoprazole   Solifenacin - last filled in Dec 2024 for 30 days   Tizanidine - last filled in Dec 2024 for 10 days     Medications added/doses adjusted:  None     Other notes (ex. Recent course of antibiotics, Coumadin dosing):  Per OARRS, last fill of gabapentin was on 7/11/25 with quantity 28 for 28 days.   Patient is chronically on doxycycline per ID.       Current Home Medication List at Time of Admission:  Prior to Admission medications    Medication Sig   atorvastatin (LIPITOR) 10 MG tablet TAKE ONE (1) TABLET BY MOUTH DAILY   glipiZIDE (GLUCOTROL) 5 MG tablet GIVE ONE HALF (1/2) TABLET=2.5MG BY MOUTH TWO TIMES A DAY FOR DIABETES   pantoprazole (PROTONIX) 40 MG tablet GIVE ONE (1) TABLET BY MOUTH ONE TIME A DAY FOR GERD DO NOT INTERCHANGE   ranolazine (RANEXA) 500 MG extended release tablet GIVE ONE (1) TABLET BY MOUTH TWO TIMES A DAY FOR ANGINA   rOPINIRole (REQUIP) 4 MG tablet GIVE ONE (1) TABLET BY MOUTH ONE TIME A DAY FOR RESTLESS LEGS   gabapentin (NEURONTIN) 100 MG capsule GIVE ONE (1) CAPSULE BY MOUTH ONE TIME A DAY FOR NEUROPATHY   clopidogrel (PLAVIX) 75 MG tablet GIVE ONE (1) TABLET BY MOUTH ONE TIME A DAY FOR BLOOD CLOT PREVENTION   polyethylene glycol (GLYCOLAX) 17 g packet Take 1 packet by mouth daily as needed for Constipation   vitamin D (ERGOCALCIFEROL) 1.25 MG (81253 UT) CAPS capsule GIVE ONE (1) CAPSULE BY MOUTH ONE TIME A DAY EVERY SUNDAY FOR BONE SUPPLEMENT   doxycycline hyclate (VIBRAMYCIN) 100 MG

## 2025-07-29 NOTE — ED NOTES
Report given to LETY Lau from ED.   Report method in person   The following was reviewed with receiving RN:   Current vital signs:  BP (!) 153/59   Pulse 82   Temp 98 °F (36.7 °C) (Oral)   Resp 20   Ht 1.448 m (4' 9\")   Wt 68 kg (150 lb)   SpO2 100%   BMI 32.46 kg/m²                MEWS Score: 1     Any medication or safety alerts were reviewed. Any pending diagnostics and notifications were also reviewed, as well as any safety concerns or issues, abnormal labs, abnormal imaging, and abnormal assessment findings. Questions were answered.

## 2025-07-29 NOTE — H&P
[facility]  IN-PATIENT SERVICE  [facility]  IN-PATIENT SERVICE   [facility]     HISTORY AND PHYSICAL EXAMINATION            Date:   7/29/2025  Patient name:  Komal Crawford  Date of admission:  7/29/2025  9:49 AM  MRN:   [id number]  Account:  [id number]  YOB: 1940  PCP:    Geno Mcdonnell PA-C  Room:   2049/2049-01  Code Status:    Full Code    Chief Complaint:     Chief Complaint   Patient presents with    Hematuria    Abdominal Pain       History Obtained From:     patient, electronic medical record    History of Present Illness:     Komal Crawford is an 84 year old female with a past medical history of diabetes mellitus last A1c 6.5% 4/3/25, hypertension, hyperlipidemia, CAD s/p stent placement 2022, L knee prothesis infection, urinary incontinence s/p suprapubic catheter 9/2024, frequent UTIs, chronic constipation s/p end loop colostomy 4/25, TKA infection s/p I&D with antibiotic powder and wound vac - hardware retention, history of ITP, history of CVA 2008. She presented to the ED on 7/29 after about a day of bladder spasms and blood in her urine. She has a suprapubic catheter since 9/24 and the tube was just exchanged. The blood in her urine turned it dark red, the urine was initially pink. She is on aspirin, Plavix. Her bladder spasms are very painful. She states she's had spasms for a few months on and off but it acutely worsened. She states she gets frequent UTIs and still has her left knee hardware after an infection in 2024, she takes daily doxycycline suppression/prophylaxis and every two weeks get clindamycin injections into her bladder. She follows with infectious disease. She states for the past month she has been getting nightly chills. She notes that over the past day she frequently feels like she is having urinary incontinence and notes the urine is bloody, denies burning when this  this  Patient not taking: Reported on 2025    Ana Carrington MD   ipratropium (ATROVENT) 0.02 % nebulizer solution Inhale 2.5 mLs into the lungs 19   Ana Carrington MD   Multiple Vitamins-Minerals (THERAPEUTIC MULTIVITAMIN-MINERALS) tablet Take 1 tablet by mouth daily    Ana Carrington MD   omeprazole (PRILOSEC) 20 MG delayed release capsule Take 1 capsule by mouth Daily  Patient not taking: Reported on 2025    Ana Carrington MD   aspirin 81 MG chewable tablet Take 1 tablet by mouth daily 3/12/24   Mya Melendez MD   diclofenac sodium (VOLTAREN) 1 % GEL Apply 4 g topically 2 times daily 24   Sakshi Johnson MD   Insulin Pen Needle ([employer] PEN NEEDLES 29G) 29G X 12MM MISC 1 each by Does not apply route daily 24   Sakshi Johnson MD   Continuous Blood Gluc Sensor (FREESTYLE DAVIS 14 DAY SENSOR) MISC  20   Ana Carrington MD   CRANBERRY PO Take by mouth 2 times daily    Ana Carrington MD        Allergies:     Cephalosporins, Fluorescein, Iodides, Iodinated contrast media, Povidone-iodine, Sulfa antibiotics, Cozaar [losartan], Cymbalta [duloxetine hcl], Demerol, Doxycycline, Morphine, Penicillins, Zithromax [azithromycin], Ciprofloxacin, Clindamycin, Etodolac, Lisinopril, Other, Toviaz [fesoterodine fumarate er], Clonazepam, and Metformin and related    Social History:     Tobacco:    reports that she quit smoking about 43 years ago. Her smoking use included cigarettes. She started smoking about 73 years ago. She has a 15 pack-year smoking history. She has been exposed to tobacco smoke. She quit smokeless tobacco use about 43 years ago.  Alcohol:      reports no history of alcohol use.  Drug Use:  reports no history of drug use.    Family History:     Family History   Problem Relation Age of Onset    Breast Cancer Mother     Cancer Mother         breast and uterine  41    Heart Disease Father     Heart Attack Father          32

## 2025-07-29 NOTE — ED NOTES
Patient informed nurse it felt as if she was urinating. Writer checked patient brief and upon assessment noticed small amount of blood, along with clot. RN notified  at this time. Pt. Continues to have intermittent bladder spasms.

## 2025-07-30 PROBLEM — N30.01 ACUTE CYSTITIS WITH HEMATURIA: Status: ACTIVE | Noted: 2024-03-09

## 2025-07-30 LAB
ANION GAP SERPL CALCULATED.3IONS-SCNC: 12 MMOL/L (ref 9–16)
BASOPHILS # BLD: 0.04 K/UL (ref 0–0.2)
BASOPHILS NFR BLD: 1 % (ref 0–2)
BUN SERPL-MCNC: 16 MG/DL (ref 8–23)
CALCIUM SERPL-MCNC: 9 MG/DL (ref 8.6–10.4)
CHLORIDE SERPL-SCNC: 107 MMOL/L (ref 98–107)
CO2 SERPL-SCNC: 22 MMOL/L (ref 20–31)
CREAT SERPL-MCNC: 0.8 MG/DL (ref 0.7–1.2)
EOSINOPHIL # BLD: 0.28 K/UL (ref 0–0.44)
EOSINOPHILS RELATIVE PERCENT: 4 % (ref 0–4)
ERYTHROCYTE [DISTWIDTH] IN BLOOD BY AUTOMATED COUNT: 15.6 % (ref 11.5–14.9)
GFR, ESTIMATED: 73 ML/MIN/1.73M2
GLUCOSE BLD-MCNC: 130 MG/DL (ref 65–105)
GLUCOSE BLD-MCNC: 140 MG/DL (ref 65–105)
GLUCOSE BLD-MCNC: 167 MG/DL (ref 65–105)
GLUCOSE BLD-MCNC: 173 MG/DL (ref 65–105)
GLUCOSE SERPL-MCNC: 167 MG/DL (ref 74–99)
HCT VFR BLD AUTO: 29.4 % (ref 36–46)
HCT VFR BLD AUTO: 30.7 % (ref 36–46)
HCT VFR BLD AUTO: 32.7 % (ref 36–46)
HGB BLD-MCNC: 9.1 G/DL (ref 12–16)
HGB BLD-MCNC: 9.6 G/DL (ref 12–16)
HGB BLD-MCNC: 9.9 G/DL (ref 12–16)
IMM GRANULOCYTES # BLD AUTO: 0.03 K/UL (ref 0–0.3)
IMM GRANULOCYTES NFR BLD: 0 %
LYMPHOCYTES NFR BLD: 1.95 K/UL (ref 1.1–3.7)
LYMPHOCYTES RELATIVE PERCENT: 27 % (ref 24–44)
MCH RBC QN AUTO: 29.4 PG (ref 26–34)
MCHC RBC AUTO-ENTMCNC: 31 G/DL (ref 31–37)
MCV RBC AUTO: 94.8 FL (ref 80–100)
MONOCYTES NFR BLD: 0.64 K/UL (ref 0.1–1.2)
MONOCYTES NFR BLD: 9 % (ref 3–12)
NEUTROPHILS NFR BLD: 59 % (ref 36–66)
NEUTS SEG NFR BLD: 4.26 K/UL (ref 1.5–8.1)
NRBC BLD-RTO: 0 PER 100 WBC
PLATELET # BLD AUTO: ABNORMAL K/UL (ref 150–450)
PLATELET, FLUORESCENCE: 138 K/UL (ref 150–450)
PLATELETS.RETICULATED NFR BLD AUTO: 13.6 % (ref 1.1–10.3)
PMV BLD AUTO: 14.3 FL (ref 8–13.5)
POTASSIUM SERPL-SCNC: 4 MMOL/L (ref 3.7–5.3)
RBC # BLD AUTO: 3.1 M/UL (ref 3.95–5.11)
SODIUM SERPL-SCNC: 141 MMOL/L (ref 136–145)
WBC OTHER # BLD: 7.2 K/UL (ref 3.5–11)

## 2025-07-30 PROCEDURE — 6370000000 HC RX 637 (ALT 250 FOR IP)

## 2025-07-30 PROCEDURE — 85014 HEMATOCRIT: CPT

## 2025-07-30 PROCEDURE — 6370000000 HC RX 637 (ALT 250 FOR IP): Performed by: UROLOGY

## 2025-07-30 PROCEDURE — 99233 SBSQ HOSP IP/OBS HIGH 50: CPT | Performed by: INTERNAL MEDICINE

## 2025-07-30 PROCEDURE — 99223 1ST HOSP IP/OBS HIGH 75: CPT | Performed by: INTERNAL MEDICINE

## 2025-07-30 PROCEDURE — G0545 PR INHERENT VISIT TO INPT: HCPCS | Performed by: INTERNAL MEDICINE

## 2025-07-30 PROCEDURE — 82947 ASSAY GLUCOSE BLOOD QUANT: CPT

## 2025-07-30 PROCEDURE — 99212 OFFICE O/P EST SF 10 MIN: CPT

## 2025-07-30 PROCEDURE — 2500000003 HC RX 250 WO HCPCS

## 2025-07-30 PROCEDURE — 85025 COMPLETE CBC W/AUTO DIFF WBC: CPT

## 2025-07-30 PROCEDURE — 6360000002 HC RX W HCPCS

## 2025-07-30 PROCEDURE — 85018 HEMOGLOBIN: CPT

## 2025-07-30 PROCEDURE — 80048 BASIC METABOLIC PNL TOTAL CA: CPT

## 2025-07-30 PROCEDURE — 1200000000 HC SEMI PRIVATE

## 2025-07-30 PROCEDURE — 2580000003 HC RX 258

## 2025-07-30 PROCEDURE — 36415 COLL VENOUS BLD VENIPUNCTURE: CPT

## 2025-07-30 RX ORDER — ASPIRIN 81 MG/1
81 TABLET, CHEWABLE ORAL DAILY
Status: DISCONTINUED | OUTPATIENT
Start: 2025-07-30 | End: 2025-08-02 | Stop reason: HOSPADM

## 2025-07-30 RX ORDER — HYOSCYAMINE SULFATE 0.12 MG/1
0.12 TABLET SUBLINGUAL EVERY 4 HOURS PRN
Status: DISCONTINUED | OUTPATIENT
Start: 2025-07-30 | End: 2025-08-02 | Stop reason: HOSPADM

## 2025-07-30 RX ADMIN — HYDROCODONE BITARTRATE AND ACETAMINOPHEN 1 TABLET: 5; 325 TABLET ORAL at 05:15

## 2025-07-30 RX ADMIN — GABAPENTIN 100 MG: 100 CAPSULE ORAL at 20:49

## 2025-07-30 RX ADMIN — PANTOPRAZOLE SODIUM 20 MG: 20 TABLET, DELAYED RELEASE ORAL at 05:15

## 2025-07-30 RX ADMIN — MEROPENEM 1000 MG: 1 INJECTION INTRAVENOUS at 03:30

## 2025-07-30 RX ADMIN — HYOSCYAMINE SULFATE 0.12 MG: 0.12 TABLET SUBLINGUAL at 10:51

## 2025-07-30 RX ADMIN — TROSPIUM CHLORIDE 20 MG: 20 TABLET, FILM COATED ORAL at 20:49

## 2025-07-30 RX ADMIN — FUROSEMIDE 20 MG: 20 TABLET ORAL at 09:10

## 2025-07-30 RX ADMIN — METOPROLOL SUCCINATE 12.5 MG: 25 TABLET, EXTENDED RELEASE ORAL at 09:10

## 2025-07-30 RX ADMIN — ROPINIROLE HYDROCHLORIDE 4 MG: 2 TABLET, FILM COATED ORAL at 20:49

## 2025-07-30 RX ADMIN — ATORVASTATIN CALCIUM 10 MG: 10 TABLET, FILM COATED ORAL at 09:10

## 2025-07-30 RX ADMIN — HYOSCYAMINE SULFATE 0.12 MG: 0.12 TABLET SUBLINGUAL at 18:11

## 2025-07-30 RX ADMIN — MEROPENEM 1000 MG: 1 INJECTION INTRAVENOUS at 13:15

## 2025-07-30 RX ADMIN — SODIUM CHLORIDE, PRESERVATIVE FREE 10 ML: 5 INJECTION INTRAVENOUS at 20:49

## 2025-07-30 RX ADMIN — ACETAMINOPHEN 650 MG: 325 TABLET ORAL at 03:14

## 2025-07-30 ASSESSMENT — PAIN SCALES - GENERAL
PAINLEVEL_OUTOF10: 1
PAINLEVEL_OUTOF10: 0
PAINLEVEL_OUTOF10: 6
PAINLEVEL_OUTOF10: 6

## 2025-07-30 ASSESSMENT — PAIN DESCRIPTION - DESCRIPTORS
DESCRIPTORS: TENDER;SPASM
DESCRIPTORS: SPASM
DESCRIPTORS: SPASM

## 2025-07-30 ASSESSMENT — PAIN DESCRIPTION - LOCATION
LOCATION: ABDOMEN;PELVIS
LOCATION: PELVIS
LOCATION: PELVIS

## 2025-07-30 NOTE — PROGRESS NOTES
Physical Therapy        Physical Therapy Cancel Note      DATE: 2025    NAME: Komal Crawford  MRN: [id number]   : 1940      Patient not seen this date for Physical Therapy due to:    Patient is not appropriate for PT evaluation/treatment at this time d/t bladder spasms. Hold PT today per nursing. Will check back tomorrow      Electronically signed by Vivian Bucio PT on 2025 at 10:06 AM

## 2025-07-30 NOTE — ACP (ADVANCE CARE PLANNING)
Advance Care Planning     Advance Care Planning Activator (Inpatient)  Conversation Note      Date of ACP Conversation: 7/30/2025     Conversation Conducted with: Patient with Decision Making Capacity    ACP Activator: Nisreen Bowers RN    Health Care Decision Maker:     Current Designated Health Care Decision Maker:     Primary Decision Maker: Ritchie Crawford - Spouse - [phone]    Secondary Decision Maker: Ramila Corcoran - Friend - [phone]  Click here to complete Healthcare Decision Makers including section of the Healthcare Decision Maker Relationship (ie \"Primary\")  Today we documented Decision Maker(s) consistent with Legal Next of Kin hierarchy.    Care Preferences    Ventilation:  \"If you were in your present state of health and suddenly became very ill and were unable to breathe on your own, what would your preference be about the use of a ventilator (breathing machine) if it were available to you?\"      Would the patient desire the use of ventilator (breathing machine)?: yes    \"If your health worsens and it becomes clear that your chance of recovery is unlikely, what would your preference be about the use of a ventilator (breathing machine) if it were available to you?\"     Would the patient desire the use of ventilator (breathing machine)?: No      Resuscitation  \"CPR works best to restart the heart when there is a sudden event, like a heart attack, in someone who is otherwise healthy. Unfortunately, CPR does not typically restart the heart for people who have serious health conditions or who are very sick.\"    \"In the event your heart stopped as a result of an underlying serious health condition, would you want attempts to be made to restart your heart (answer \"yes\" for attempt to resuscitate) or would you prefer a natural death (answer \"no\" for do not attempt to resuscitate)?\" yes       [] Yes   [x] No   Educated Patient / Decision Maker regarding differences between Advance Directives and

## 2025-07-30 NOTE — PROGRESS NOTES
Writer notified Dr. Napoles of patient having leakage around reeves and of spasms throughout night. Last 3000 mL bag only having 1500 mL out from the reeves with writer having to place multiple absorbant pads underneath patient from leakage. MD called unit and instructed writer to hand irrigate the reeves for potential clot causing these spasms and to slow the CBI rate after hand irrigation.

## 2025-07-30 NOTE — PLAN OF CARE
Problem: Safety - Adult  Goal: Free from fall injury  7/30/2025 1456 by Kasandra Meyer RN  Outcome: Progressing  7/30/2025 0306 by Felix Mendoza RN  Outcome: Progressing     Problem: Chronic Conditions and Co-morbidities  Goal: Patient's chronic conditions and co-morbidity symptoms are monitored and maintained or improved  7/30/2025 1456 by Kasandra Meyer RN  Outcome: Progressing  7/30/2025 0306 by Felix Mendoza RN  Outcome: Progressing     Problem: Discharge Planning  Goal: Discharge to home or other facility with appropriate resources  7/30/2025 1456 by Kasandra Meyer RN  Outcome: Progressing  7/30/2025 0306 by Felix Mendoza RN  Outcome: Progressing     Problem: Pain  Goal: Verbalizes/displays adequate comfort level or baseline comfort level  7/30/2025 1456 by Kasandra Meyer RN  Outcome: Progressing  7/30/2025 0306 by Felix Mendoza RN  Outcome: Progressing     Problem: Skin/Tissue Integrity  Goal: Skin integrity remains intact  Description: 1.  Monitor for areas of redness and/or skin breakdown  2.  Assess vascular access sites hourly  3.  Every 4-6 hours minimum:  Change oxygen saturation probe site  4.  Every 4-6 hours:  If on nasal continuous positive airway pressure, respiratory therapy assess nares and determine need for appliance change or resting period  7/30/2025 1456 by Kasandra Meyer RN  Outcome: Progressing  7/30/2025 0306 by Felix Mendoza RN  Outcome: Progressing     Problem: Neurosensory - Adult  Goal: Achieves stable or improved neurological status  7/30/2025 1456 by Kasandra Meyer RN  Outcome: Progressing  7/30/2025 0306 by Felix Mendoza RN  Outcome: Progressing  Goal: Absence of seizures  7/30/2025 1456 by Kasandra Meyer RN  Outcome: Progressing  7/30/2025 0306 by Felix Mendoza RN  Outcome: Progressing     Problem: Skin/Tissue Integrity - Adult  Goal: Skin integrity remains intact  Description: 1.  Monitor for areas of redness and/or skin breakdown  2.

## 2025-07-30 NOTE — PLAN OF CARE
Problem: Safety - Adult  Goal: Free from fall injury  7/30/2025 0306 by Felix Mendoza RN  Outcome: Progressing  7/29/2025 1721 by Devendra Antoine RN  Outcome: Progressing  Flowsheets (Taken 7/29/2025 1439)  Free From Fall Injury: Instruct family/caregiver on patient safety     Problem: Chronic Conditions and Co-morbidities  Goal: Patient's chronic conditions and co-morbidity symptoms are monitored and maintained or improved  7/30/2025 0306 by Felix Mendoza RN  Outcome: Progressing  7/29/2025 1721 by Devendra Antoine RN  Outcome: Progressing  Flowsheets (Taken 7/29/2025 1712)  Care Plan - Patient's Chronic Conditions and Co-Morbidity Symptoms are Monitored and Maintained or Improved: Monitor and assess patient's chronic conditions and comorbid symptoms for stability, deterioration, or improvement     Problem: Discharge Planning  Goal: Discharge to home or other facility with appropriate resources  7/30/2025 0306 by Felix Mendoza RN  Outcome: Progressing  7/29/2025 1721 by Devendra Antoine RN  Outcome: Progressing  Flowsheets (Taken 7/29/2025 1712)  Discharge to home or other facility with appropriate resources:   Identify barriers to discharge with patient and caregiver   Arrange for needed discharge resources and transportation as appropriate   Identify discharge learning needs (meds, wound care, etc)     Problem: Pain  Goal: Verbalizes/displays adequate comfort level or baseline comfort level  7/30/2025 0306 by Felix Mendoza RN  Outcome: Progressing  7/29/2025 1721 by Devendra Antoine RN  Outcome: Progressing     Problem: Skin/Tissue Integrity  Goal: Skin integrity remains intact  Description: 1.  Monitor for areas of redness and/or skin breakdown  2.  Assess vascular access sites hourly  3.  Every 4-6 hours minimum:  Change oxygen saturation probe site  4.  Every 4-6 hours:  If on nasal continuous positive airway pressure, respiratory therapy assess nares and determine need for appliance change or resting

## 2025-07-30 NOTE — CONSULTS
Q12H    atorvastatin  10 mg Oral Daily    furosemide  20 mg Oral Daily    gabapentin  100 mg Oral Nightly    pantoprazole  20 mg Oral QAM AC    rOPINIRole  4 mg Oral Nightly    sodium chloride flush  5-40 mL IntraVENous 2 times per day    metoprolol succinate  12.5 mg Oral Daily    [Held by provider] clopidogrel  75 mg Oral Daily    insulin lispro  0-4 Units SubCUTAneous 4x Daily AC & HS    trospium  20 mg Oral Nightly     Social History:     Social History     Socioeconomic History    Marital status:      Spouse name: Don    Number of children: 0    Years of education: 12    Highest education level: Not on file   Occupational History    Occupation: retired   Tobacco Use    Smoking status: Former     Current packs/day: 0.00     Average packs/day: 0.5 packs/day for 30.0 years (15.0 ttl pk-yrs)     Types: Cigarettes     Start date: 1952     Quit date: 1982     Years since quittin.1     Passive exposure: Past    Smokeless tobacco: Former     Quit date: 1982   Vaping Use    Vaping status: Never Used   Substance and Sexual Activity    Alcohol use: No     Comment: very rare glass of wine    Drug use: No    Sexual activity: Not Currently   Other Topics Concern    Not on file   Social History Narrative    Not on file     Social Drivers of Health     Financial Resource Strain: Low Risk  (2025)    Overall Financial Resource Strain (CARDIA)     Difficulty of Paying Living Expenses: Not hard at all   Food Insecurity: No Food Insecurity (2025)    Hunger Vital Sign     Worried About Running Out of Food in the Last Year: Never true     Ran Out of Food in the Last Year: Never true   Transportation Needs: Unmet Transportation Needs (2025)    PRAPARE - Transportation     Lack of Transportation (Medical): Yes     Lack of Transportation (Non-Medical): No   Physical Activity: Inactive (2025)    Exercise Vital Sign     Days of Exercise per Week: 0 days     Minutes of Exercise per Session: 0  min   Stress: Stress Concern Present (2025)    [place] [employer] - Occupational Stress Questionnaire     Feeling of Stress : To some extent   Social Connections: Unknown (2025)    Social Connection and Isolation Panel [NHANES]     Frequency of Communication with Friends and Family: Once a week     Frequency of Social Gatherings with Friends and Family: Not on file     Attends Buddhism Services: More than 4 times per year     Active Member of Clubs or Organizations: Yes     Attends Club or Organization Meetings: Never     Marital Status:    Intimate Partner Violence: Not At Risk (2023)    Received from [facility]    Humiliation, Afraid, Rape, and Kick questionnaire     Fear of Current or Ex-Partner: No     Emotionally Abused: No     Physically Abused: No     Sexually Abused: No   Housing Stability: Low Risk  (2025)    Housing Stability Vital Sign     Unable to Pay for Housing in the Last Year: No     Number of Times Moved in the Last Year: 0     Homeless in the Last Year: No     Family History:     Family History   Problem Relation Age of Onset    Breast Cancer Mother     Cancer Mother         breast and uterine  41    Heart Disease Father     Heart Attack Father          32    Cancer Maternal Grandmother     Cancer Brother 52        bronchogenic adenocarcinoma cancer    Lung Cancer Brother     Cancer Sister         lung    Lung Cancer Sister          65    Breast Cancer Sister          57    Cancer Sister         breast        Medical Decision Making:   I have independently reviewed/ordered the following labs:  CBC with Differential:   Recent Labs     25  1055 25  2130 25  0404 25  0932   WBC 8.1  --  7.2  --    HGB 9.2*   < > 9.1* 9.9*   HCT 29.4*   < > 29.4* 32.7*   PLT See Reflexed IPF Result  --  See Reflexed IPF Result  --    LYMPHOPCT 16*  --  27  --    MONOPCT 7  --  9  --    EOSPCT 2  --

## 2025-07-30 NOTE — DISCHARGE INSTR - COC
Continuity of Care Form    Patient Name: Komal Crawford   :  1940  MRN:  [id number]    Admit date:  2025  Discharge date:      Code Status Order: Full Code   Advance Directives:     Admitting Physician:  Lavern Rodgers MD  PCP: Geno Mcdonnell PA-C    Discharging Nurse: ashok  Discharging Hospital Unit/Room#: 2049/2049-01  Discharging Unit Phone Number: [phone]    Emergency Contact:   Extended Emergency Contact Information  Primary Emergency Contact: Ritchie Crawford  Address: [address] [place] [address]            [place] [address] [facility]  Home Phone: [phone]  Work Phone: [phone]  Mobile Phone: [phone]  Relation: Spouse  Secondary Emergency Contact: Ramila Corcoran  Home Phone: [phone]  Relation: Friend    Past Surgical History:  Past Surgical History:   Procedure Laterality Date    APPENDECTOMY      BACK SURGERY      benign tumor    BLADDER SURGERY      bladder stimulator    BLADDER SUSPENSION      multiple    CARDIAC CATHETERIZATION      2 stents in RCA    CARDIOVASCULAR STRESS TEST  2014    CATARACT REMOVAL WITH IMPLANT Left 2017    [facility]    CATARACT REMOVAL WITH IMPLANT Right 2017    [facility]    COLON SURGERY      colostomy reversal    COLONOSCOPY  2016    tubular adenoma x3; internal hemorrhoids    COLONOSCOPY N/A 2019    COLONOSCOPY DIAGNOSTIC performed by Eli [facility]elli [facility] at [facility]    COLONOSCOPY  2019    COLOSTOMY  1986    bowel obstruction/ rupture from diverticular disease, reversal 3 mos later    CORONARY ANGIOPLASTY WITH STENT PLACEMENT  08/04/2022    x2    CYSTOSCOPY  2017    W/ 200IU Botox     CYSTOSCOPY N/A 2024    CYSTOSCOPY SUPRAPUBIC TUBE PLACEMENT performed by Omega Lawson MD at [facility] OR    FRACTURE SURGERY Right     hip    FRACTURE SURGERY Left     WRIST    FRACTURE SURGERY Left     ankle    HERNIA REPAIR      HIP SURGERY

## 2025-07-30 NOTE — PROGRESS NOTES
[facility] Internal Medicine  Sohan Aguilar MD; Bala Olivares MD, Sandrita Claudio MD, Mya Melendez MD, Dr. NAKUL Dawson; Lavern Rodgers MD    [facility] Internal Medicine   IN-PATIENT SERVICE   [facility]     Progress Note    7/30/2025    1:44 PM    Name:   Komal Crawford  MRN:     [id number]     Acct:      [id number]   Room:   2049/2049-01   Day:  1  Admit Date:  7/29/2025  9:49 AM    PCP:   Geno Mcdonnell PA-C  Code Status:  Full Code    Subjective:     C/C:   Chief Complaint   Patient presents with    Hematuria    Abdominal Pain     Interval History Status:   Seen and examined face-to-face,  Still having lots of hematuria,  In discomfort      Brief History:   Komal Crawford is an 84 year old female with a past medical history of diabetes mellitus last A1c 6.5% 4/3/25, hypertension, hyperlipidemia, CAD s/p stent placement 2022, L knee prothesis infection, urinary incontinence s/p suprapubic catheter 9/2024, frequent UTIs, chronic constipation s/p end loop colostomy 4/25, TKA infection s/p I&D with antibiotic powder and wound vac - hardware retention, history of ITP, history of CVA 2008. She presented to the ED on 7/29 after about a day of bladder spasms and blood in her urine. She has a suprapubic catheter since 9/24 and the tube was just exchanged. The blood in her urine turned it dark red, the urine was initially pink. She is on aspirin, Plavix. Her bladder spasms are very painful. She states she's had spasms for a few months on and off but it acutely worsened. She states she gets frequent UTIs and still has her left knee hardware after an infection in 2024, she takes daily doxycycline suppression/prophylaxis and every two weeks get clindamycin injections into her bladder. She follows with infectious disease. She states for the past month she has been getting nightly chills. She notes that over the past day she frequently feels like she is having urinary incontinence and notes the

## 2025-07-30 NOTE — CARE COORDINATION
Case Management Assessment  Initial Evaluation    Date/Time of Evaluation: 7/30/2025 9:33 AM  Assessment Completed by: Nisreen Bowers RN    If patient is discharged prior to next notation, then this note serves as note for discharge by case management.    Patient Name: Komal Crawford                   YOB: 1940  Diagnosis: Hematuria [R31.9]  Acute cystitis with hematuria [N30.01]                   Date / Time: 7/29/2025  9:49 AM    Patient Admission Status: Inpatient   Readmission Risk (Low < 19, Mod (19-27), High > 27): Readmission Risk Score: 18.9    Current PCP: Geno Mcdonnell PA-C  PCP verified by CM? Yes    Chart Reviewed: Yes      History Provided by: Patient  Patient Orientation: Alert and Oriented    Patient Cognition: Alert    Hospitalization in the last 30 days (Readmission):  No    If yes, Readmission Assessment in CM Navigator will be completed.    Advance Directives:      Code Status: Full Code   Patient's Primary Decision Maker is: Legal Next of Kin    Primary Decision Maker: Ritchie Crawford - Spouse - [phone]    Secondary Decision Maker: Ramila Corcoran - Friend - [phone]    Discharge Planning:    Patient lives with: Spouse/Significant Other Type of Home: House  Primary Care Giver: Self  Patient Support Systems include: Spouse/Significant Other   Current Financial resources: Medicare  Current community resources: ECF/Home Care  Current services prior to admission: Durable Medical Equipment, Home Care            Current DME: Walker, Wheelchair, Shower Chair, Glucometer            Type of Home Care services:  Nursing Services, OT, PT    ADLS  Prior functional level: Independent in ADLs/IADLs  Current functional level: Independent in ADLs/IADLs    PT AM-PAC:   /24  OT AM-PAC:   /24    Family can provide assistance at DC: Yes  Would you like Case Management to discuss the discharge plan with any other family members/significant others, and if so, who? Yes (Spouse; Ritchie)  Plans

## 2025-07-30 NOTE — PROGRESS NOTES
[facility]   OCCUPATIONAL THERAPY MISSED TREATMENT NOTE   INPATIENT   Date: 25  Patient Name: Komal Crawford       Room:   MRN: [id number]   Account #: [id number]    : 1940  (84 y.o.)  Gender: female                 REASON FOR MISSED TREATMENT:  Pt not seen this am for the OT evaluation due to   -   Pt on Hold at this time for the OT evaluation per RN as pt is experiencing extreme bladder spasms. OT will reattempt to see pt at a later date/time when medically appropriate as schedule permits.     0918 Attempt        Electronically signed by ELDA Orozco  on 25 at 10:21 AM EDT

## 2025-07-30 NOTE — CONSULTS
[facility]  Progress Note      NAME:  Komal Crawford  MEDICAL RECORD NUMBER:  [id number]  AGE: 84 y.o.   GENDER: female  : 1940  TODAY'S DATE:  2025    [facility] nurse consult for buttock wound and ostomy. Patient well known to writer from previous admissions. She presented to ER yesterday with hematuria and bladder spasms. She has a chronic suprapubic catheter that is changed every 2 weeks. She has a transverse colostomy that was created in 2025 due to fecal incontinence. She previously had a wound to her buttocks/sacral area and was following with outpatient wound care, but the wound was healed in . Buttocks assessed and no open areas or areas of concern noted. Will follow patient for ostomy care as she uses some products that may need ordered due to the size of her stoma. Extra pouch and accessories left in room.     Ostomy supplies used:   #[id number]     SenSura Flex Flat Barrier 10-88mm (gray)  #[id number]     SenSura Flex Maxi Drainable Pouch (gray)  #[id number]   Yahaira Cohesive Seal StomaWrap  #[id number]     Brava Strip Paste  #[id number]   Skin Tac    Anu Henderson, JAYAN, RN, CWOCN, [facility]  Wound, Ostomy, and Continence Nursing  [phone]

## 2025-07-31 LAB
ANION GAP SERPL CALCULATED.3IONS-SCNC: 12 MMOL/L (ref 9–16)
BASOPHILS # BLD: 0.08 K/UL (ref 0–0.2)
BASOPHILS NFR BLD: 1 % (ref 0–2)
BUN SERPL-MCNC: 17 MG/DL (ref 8–23)
CALCIUM SERPL-MCNC: 9.1 MG/DL (ref 8.6–10.4)
CHLORIDE SERPL-SCNC: 104 MMOL/L (ref 98–107)
CO2 SERPL-SCNC: 22 MMOL/L (ref 20–31)
CREAT SERPL-MCNC: 0.7 MG/DL (ref 0.7–1.2)
EOSINOPHIL # BLD: 0.43 K/UL (ref 0–0.44)
EOSINOPHILS RELATIVE PERCENT: 7 % (ref 0–4)
ERYTHROCYTE [DISTWIDTH] IN BLOOD BY AUTOMATED COUNT: 15.4 % (ref 11.5–14.9)
GFR, ESTIMATED: 86 ML/MIN/1.73M2
GLUCOSE BLD-MCNC: 147 MG/DL (ref 65–105)
GLUCOSE BLD-MCNC: 155 MG/DL (ref 65–105)
GLUCOSE BLD-MCNC: 203 MG/DL (ref 65–105)
GLUCOSE BLD-MCNC: 256 MG/DL (ref 65–105)
GLUCOSE SERPL-MCNC: 172 MG/DL (ref 74–99)
HCT VFR BLD AUTO: 29.7 % (ref 36–46)
HCT VFR BLD AUTO: 29.9 % (ref 36–46)
HCT VFR BLD AUTO: 32.1 % (ref 36–46)
HGB BLD-MCNC: 10.1 G/DL (ref 12–16)
HGB BLD-MCNC: 9.1 G/DL (ref 12–16)
HGB BLD-MCNC: 9.2 G/DL (ref 12–16)
IMM GRANULOCYTES # BLD AUTO: 0.03 K/UL (ref 0–0.3)
IMM GRANULOCYTES NFR BLD: 1 %
LYMPHOCYTES NFR BLD: 1.37 K/UL (ref 1.1–3.7)
LYMPHOCYTES RELATIVE PERCENT: 22 % (ref 24–44)
MCH RBC QN AUTO: 30 PG (ref 26–34)
MCHC RBC AUTO-ENTMCNC: 30.8 G/DL (ref 31–37)
MCV RBC AUTO: 97.4 FL (ref 80–100)
MONOCYTES NFR BLD: 0.64 K/UL (ref 0.1–1.2)
MONOCYTES NFR BLD: 10 % (ref 3–12)
NEUTROPHILS NFR BLD: 59 % (ref 36–66)
NEUTS SEG NFR BLD: 3.63 K/UL (ref 1.5–8.1)
NRBC BLD-RTO: 0 PER 100 WBC
PLATELET # BLD AUTO: ABNORMAL K/UL (ref 150–450)
PLATELET, FLUORESCENCE: 156 K/UL (ref 150–450)
PLATELETS.RETICULATED NFR BLD AUTO: 15.8 % (ref 1.1–10.3)
PMV BLD AUTO: 13.9 FL (ref 8–13.5)
POTASSIUM SERPL-SCNC: 4 MMOL/L (ref 3.7–5.3)
RBC # BLD AUTO: 3.07 M/UL (ref 3.95–5.11)
SODIUM SERPL-SCNC: 138 MMOL/L (ref 136–145)
WBC OTHER # BLD: 6.2 K/UL (ref 3.5–11)

## 2025-07-31 PROCEDURE — 85014 HEMATOCRIT: CPT

## 2025-07-31 PROCEDURE — 6370000000 HC RX 637 (ALT 250 FOR IP)

## 2025-07-31 PROCEDURE — 85018 HEMOGLOBIN: CPT

## 2025-07-31 PROCEDURE — 97530 THERAPEUTIC ACTIVITIES: CPT

## 2025-07-31 PROCEDURE — 99233 SBSQ HOSP IP/OBS HIGH 50: CPT | Performed by: INTERNAL MEDICINE

## 2025-07-31 PROCEDURE — 80048 BASIC METABOLIC PNL TOTAL CA: CPT

## 2025-07-31 PROCEDURE — 6370000000 HC RX 637 (ALT 250 FOR IP): Performed by: UROLOGY

## 2025-07-31 PROCEDURE — 97116 GAIT TRAINING THERAPY: CPT

## 2025-07-31 PROCEDURE — 82947 ASSAY GLUCOSE BLOOD QUANT: CPT

## 2025-07-31 PROCEDURE — 97166 OT EVAL MOD COMPLEX 45 MIN: CPT

## 2025-07-31 PROCEDURE — 97535 SELF CARE MNGMENT TRAINING: CPT

## 2025-07-31 PROCEDURE — 1200000000 HC SEMI PRIVATE

## 2025-07-31 PROCEDURE — 2500000003 HC RX 250 WO HCPCS

## 2025-07-31 PROCEDURE — 85025 COMPLETE CBC W/AUTO DIFF WBC: CPT

## 2025-07-31 PROCEDURE — G0545 PR INHERENT VISIT TO INPT: HCPCS | Performed by: INTERNAL MEDICINE

## 2025-07-31 PROCEDURE — 6360000002 HC RX W HCPCS

## 2025-07-31 PROCEDURE — 2580000003 HC RX 258

## 2025-07-31 PROCEDURE — 97162 PT EVAL MOD COMPLEX 30 MIN: CPT

## 2025-07-31 PROCEDURE — 36415 COLL VENOUS BLD VENIPUNCTURE: CPT

## 2025-07-31 PROCEDURE — 99232 SBSQ HOSP IP/OBS MODERATE 35: CPT | Performed by: INTERNAL MEDICINE

## 2025-07-31 RX ADMIN — ROPINIROLE HYDROCHLORIDE 4 MG: 2 TABLET, FILM COATED ORAL at 21:47

## 2025-07-31 RX ADMIN — MEROPENEM 1000 MG: 1 INJECTION INTRAVENOUS at 01:02

## 2025-07-31 RX ADMIN — FUROSEMIDE 20 MG: 20 TABLET ORAL at 08:12

## 2025-07-31 RX ADMIN — ATORVASTATIN CALCIUM 10 MG: 10 TABLET, FILM COATED ORAL at 21:47

## 2025-07-31 RX ADMIN — MEROPENEM 1000 MG: 1 INJECTION INTRAVENOUS at 21:49

## 2025-07-31 RX ADMIN — TROSPIUM CHLORIDE 20 MG: 20 TABLET, FILM COATED ORAL at 21:47

## 2025-07-31 RX ADMIN — SODIUM CHLORIDE, PRESERVATIVE FREE 10 ML: 5 INJECTION INTRAVENOUS at 08:17

## 2025-07-31 RX ADMIN — MEROPENEM 1000 MG: 1 INJECTION INTRAVENOUS at 11:37

## 2025-07-31 RX ADMIN — INSULIN LISPRO 1 UNITS: 100 INJECTION, SOLUTION INTRAVENOUS; SUBCUTANEOUS at 11:31

## 2025-07-31 RX ADMIN — PANTOPRAZOLE SODIUM 20 MG: 20 TABLET, DELAYED RELEASE ORAL at 06:23

## 2025-07-31 RX ADMIN — METOPROLOL SUCCINATE 12.5 MG: 25 TABLET, EXTENDED RELEASE ORAL at 08:12

## 2025-07-31 RX ADMIN — GABAPENTIN 100 MG: 100 CAPSULE ORAL at 21:47

## 2025-07-31 RX ADMIN — HYOSCYAMINE SULFATE 0.12 MG: 0.12 TABLET SUBLINGUAL at 06:06

## 2025-07-31 ASSESSMENT — PAIN DESCRIPTION - DESCRIPTORS
DESCRIPTORS: CRAMPING
DESCRIPTORS: DISCOMFORT;SPASM

## 2025-07-31 ASSESSMENT — PAIN DESCRIPTION - LOCATION
LOCATION: ABDOMEN
LOCATION: ABDOMEN

## 2025-07-31 ASSESSMENT — PAIN SCALES - GENERAL
PAINLEVEL_OUTOF10: 3
PAINLEVEL_OUTOF10: 0

## 2025-07-31 ASSESSMENT — PAIN DESCRIPTION - PAIN TYPE: TYPE: ACUTE PAIN

## 2025-07-31 ASSESSMENT — PAIN DESCRIPTION - ORIENTATION: ORIENTATION: LOWER

## 2025-07-31 ASSESSMENT — PAIN - FUNCTIONAL ASSESSMENT: PAIN_FUNCTIONAL_ASSESSMENT: ACTIVITIES ARE NOT PREVENTED

## 2025-07-31 ASSESSMENT — PAIN DESCRIPTION - ONSET: ONSET: ON-GOING

## 2025-07-31 ASSESSMENT — ENCOUNTER SYMPTOMS: ABDOMINAL PAIN: 1

## 2025-07-31 ASSESSMENT — PAIN DESCRIPTION - FREQUENCY: FREQUENCY: CONTINUOUS

## 2025-07-31 NOTE — CARE COORDINATION
DISCHARGE PLANNING NOTE:    This writer reviewed note from SENA Zapien. Patient will need IV merrem through 8/4/25, 1000 mg Q8H. Referral to [facility] to check cost and availability if patient were able to discharge 8/1.    Awaiting response.    Electronically signed by Nisreen Bowers RN on 7/31/2025 at 3:39 PM    ADDENDUM:    This writer spoke with patient at bedside and discussed the possibility of IV antibiotics going home. Updated the patient that [facility] usually requires at least 3 days of IV antibiotics delivery in order to supply on discharge, therefore if she is not medically ready for discharge tomorrow she may have to do skilled nursing facility. Patient stated that she is not interested in skilled nursing facility at this time. This writer explained that we could check with ID to see if they can switch to something once daily and she would be able to come to the infusion center and patient was agreeable to that. Patient states that she does have a family member, Ramila, that would be able to possibly assist with three time daily infusions at home if discharge 8/1. Awaiting cost from [facility], as well as discharge plans.     Updated the patient that we will follow in the morning and plan accordingly.     Electronically signed by Nisreen Bowers RN on 7/31/2025 at 4:10 PM

## 2025-07-31 NOTE — PROGRESS NOTES
Department of Urology  Urology Progress Note    Patient:  Komal Crawford  MRN: [id number]  YOB: 1940    Interval hx:  Patient doing well this morning.   SP tube in place (Changed morning of hospital admission by [facility], no need to exchange inpatient).   SP tube is plugged.   Has urethral cath- 3 way, CBI at moderate rate, urine is clear at this time.   Per nursing- clots when CBI is slowed down, various sizes, happens about every 4 hours.   Anticoags held.  Ucx preliminary- gram negative rods, ID following - merrem.  She tells me she self cathed for 20+ years, she doesn't like SP tube and wants removed in future.      Patient Vitals for the past 24 hrs:   BP Temp Temp src Pulse Resp SpO2   07/31/25 0615 (!) 148/78 97.7 °F (36.5 °C) Oral 78 16 95 %   07/30/25 2002 -- -- -- 80 -- --   07/30/25 1843 (!) 134/59 98.1 °F (36.7 °C) Oral 73 16 97 %       Intake/Output Summary (Last 24 hours) at 7/31/2025 0822  Last data filed at 7/31/2025 0625  Gross per 24 hour   Intake 762.38 ml   Output 450 ml   Net 312.38 ml       Recent Labs     07/29/25  1055 07/29/25  2130 07/30/25  0404 07/30/25  0932 07/30/25  1635 07/31/25  0122 07/31/25  0628   WBC 8.1  --  7.2  --   --  6.2  --    HGB 9.2*   < > 9.1*   < > 9.6* 9.2* 9.1*   HCT 29.4*   < > 29.4*   < > 30.7* 29.9* 29.7*   MCV 93.3  --  94.8  --   --  97.4  --    PLT See Reflexed IPF Result  --  See Reflexed IPF Result  --   --  See Reflexed IPF Result  --     < > = values in this interval not displayed.     Recent Labs     07/29/25  1055 07/30/25  0404 07/31/25  0122    141 138   K 4.0 4.0 4.0    107 104   CO2 23 22 22   BUN 18 16 17   CREATININE 0.8 0.8 0.7       Recent Labs     07/29/25  1055   COLORU Red*   PHUR 5.5   WBCUA TOO NUMEROUS TO COUNT*   RBCUA TOO NUMEROUS TO COUNT*   BACTERIA MODERATE*   LEUKOCYTESUR MOD*   UROBILINOGEN Normal   BILIRUBINUR SMALL*       Additional Lab/culture results:    Physical Exam:  Constitutional: Patient in no acute  Decubitus ulcer of left buttock, stage 3 (HCC)    Candidiasis    Suprapubic catheter (HCC)    Pressure injury of right buttock, stage 3 (HCC)    Incontinence in female    Status post colostomy (HCC)    Hematuria       Plan:   Continue to wean CBI as tolerated, goal is pink tinged/clear yellow urine.   Hand irrigate PRN.   ID managing abx for UTI.   Patient may eat today.   NPO at midnight in event her symptoms worsen overnight.  SP tube to be maintained at d/c, defer  chronic reeves management to Dr. Lawson.     Electronically signed by MU Jackson CNP on 7/31/2025 at 8:22 AM

## 2025-07-31 NOTE — CARE COORDINATION
Case Management   Daily Progress Note       Patient Name: Komal Crawford                   YOB: 1940  Diagnosis: Hematuria [R31.9]  Acute cystitis with hematuria [N30.01]                       GMLOS: 2.8 days  Length of Stay: 2  days    Readmission Risk (Low < 19, Mod (19-27), High > 27): Readmission Risk Score: 20      Patient is alert and oriented.    Spoke with patient, and Current Transitional Plan is:    [] Home Independently    [x] Home with HC; [facility]    [] Skilled Nursing Facility    [] Acute Rehabilitation    [] Long Term Acute Care (LTAC)    [] Other:     Medical Management: Urology-CBI continues, NPO at midnight in event symptoms worsen. SP cath in place since 2024.    ID-IV merrem.     Testing Ordered: Following urine cultures.    Additional Notes: Awaiting PT/OT evaluation.     Electronically signed by Nisreen Bowers RN on 7/31/2025 at 10:04 AM            Handoff

## 2025-07-31 NOTE — PLAN OF CARE
Problem: Safety - Adult  Goal: Free from fall injury  Outcome: Progressing     Problem: Chronic Conditions and Co-morbidities  Goal: Patient's chronic conditions and co-morbidity symptoms are monitored and maintained or improved  Outcome: Progressing     Problem: Discharge Planning  Goal: Discharge to home or other facility with appropriate resources  Outcome: Progressing     Problem: Pain  Goal: Verbalizes/displays adequate comfort level or baseline comfort level  Outcome: Progressing     Problem: Skin/Tissue Integrity  Goal: Skin integrity remains intact  Description: 1.  Monitor for areas of redness and/or skin breakdown  2.  Assess vascular access sites hourly  3.  Every 4-6 hours minimum:  Change oxygen saturation probe site  4.  Every 4-6 hours:  If on nasal continuous positive airway pressure, respiratory therapy assess nares and determine need for appliance change or resting period  Outcome: Progressing     Problem: Neurosensory - Adult  Goal: Achieves stable or improved neurological status  Outcome: Progressing  Goal: Absence of seizures  Outcome: Progressing     Problem: Skin/Tissue Integrity - Adult  Goal: Skin integrity remains intact  Description: 1.  Monitor for areas of redness and/or skin breakdown  2.  Assess vascular access sites hourly  3.  Every 4-6 hours minimum:  Change oxygen saturation probe site  4.  Every 4-6 hours:  If on nasal continuous positive airway pressure, respiratory therapy assess nares and determine need for appliance change or resting period  Outcome: Progressing  Goal: Incisions, wounds, or drain sites healing without S/S of infection  Outcome: Progressing     Problem: Musculoskeletal - Adult  Goal: Return mobility to safest level of function  Outcome: Progressing  Goal: Maintain proper alignment of affected body part  Outcome: Progressing     Problem: Gastrointestinal - Adult  Goal: Minimal or absence of nausea and vomiting  Outcome: Progressing  Goal: Maintains or returns

## 2025-07-31 NOTE — PROGRESS NOTES
[facility]  Progress Note    Komal Crawford  MRN: [id number]  Date: 7/31/2025  LOS: 2     Reason for Consultation/Summary of Stay:   UTI     Assessment :   UTI, gram-negative rods growth on urine culture  Urinary incontinence ,suprapubic catheter in place was changed 7/29/2025  Hematuria on bladder irrigation  Left  knee infected prosthesis status post irrigation and debridement with application of vancomycin powder and wound VAC 3/8/2024 left knee wound culture and intraoperative culture grew MRSA, vancomycin SARAH of 1, wound healed  Status post left total hip arthroplasty/displaced periprosthetic femur fracture status post left total knee arthroplasty revision 1/23/24  History of ITP  History of CVA  Diabetes mellitus  Hypertension  Hyperlipidemia  Multiple antibiotics allergy  History of multidrug-resistant organism   Cephalosporin, sulfa penicillin, Cipro, doxycycline and clindamycin allergy  Status post colostomy  Coronary artery disease    Plan:     Continue IV Meropenem, patient will need 7 day treatment EOT 8/4/25  Patient to resume her chronic suppression therapy with oral Doxycycline for previous PJI (3/8/24) after completion of UTI treatment.   Urine culture with Klebsiella Pneumoniae-sensitivities pending   Follow cultures and adjust antibiotics as needed  Renal ultrasound  Follow CBC and BUN/CR  Plan discussed with Dr. Arteaga    Infection Control Recommendations:     Carson Precautions  Contact Isolation     Antimicrobial Stewardship Recommendations:   Simplification of therapy  Targeted therapy    Discharge Planning:   Estimated Length of IV antimicrobials: TBD  Patient will need Midline Catheter Insertion/ PICC line Insertion: No  Patient will need: Home IV , Infusion Center,  SNF,  LTAC: Undetermined  Patient willneed outpatient wound care: No      History of Present Illness:   Komal Crawford is a 84 y.o.-year-old female who was admitted on 7/29/2025.  The patient presented to hospital  01/20/2024    HIP TOTAL ARTHROPLASTY performed by Jon Bonner MD at [facility] OR    UPPER GASTROINTESTINAL ENDOSCOPY  05/05/2014    UPPER GASTROINTESTINAL ENDOSCOPY  04/07/2016    mild gastritis    UPPER GASTROINTESTINAL ENDOSCOPY N/A 07/17/2018    retained thick secretions in proximal esophagus          [facility]  MU Zapien CNP  Perfect Serve messaging  OFFICE: [phone]  FAX: [phone]      Thank you for allowing us to participate in the care of this patient. Please call with questions. Electronically signed by MU Zapien CNP on 7/31/2025 at 12:08 PM  This note is created with the assistance of a speech recognition program.  While intending to generate a document that actually reflects the content of the visit, the document can still have some errors including those of syntax and sound a like substitutions which may escape proof reading.  In such instances, actual meaning can be extrapolated by contextual diversion.  Attending Physician Statement  I have discussed the care of Komal Crawford and I have examined the patient myselft and taken ros and hpi , including pertinent history and exam findings,  with the author of this note . I have reviewed the key elements of all parts of the encounter with the nurse practitioner/resident.  I agree with the assessment, plan and orders as documented by the above health care provider     More than 50% of the time was spent taking care of this patient in addition to the nurse practitioner time.  That also included history taking follow-up physical examination and review of system.         Electronically signed by David Arteaga MD

## 2025-07-31 NOTE — TELEPHONE ENCOUNTER
Patient is currently admitted to the hospital and Dr Arteaga has consulted her.  Im closing this encounter out

## 2025-07-31 NOTE — PLAN OF CARE
Problem: Safety - Adult  Goal: Free from fall injury  7/31/2025 0141 by Shell Velasquez RN  Outcome: Progressing  7/30/2025 1456 by Kasandra Meyer RN  Outcome: Progressing     Problem: Chronic Conditions and Co-morbidities  Goal: Patient's chronic conditions and co-morbidity symptoms are monitored and maintained or improved  7/31/2025 0141 by Shell Velasquez RN  Outcome: Progressing  Flowsheets (Taken 7/30/2025 2045)  Care Plan - Patient's Chronic Conditions and Co-Morbidity Symptoms are Monitored and Maintained or Improved: Monitor and assess patient's chronic conditions and comorbid symptoms for stability, deterioration, or improvement  7/30/2025 1456 by Kasandra Meyer RN  Outcome: Progressing     Problem: Discharge Planning  Goal: Discharge to home or other facility with appropriate resources  7/31/2025 0141 by Shell Velasquez RN  Outcome: Progressing  Flowsheets (Taken 7/30/2025 2045)  Discharge to home or other facility with appropriate resources: Identify barriers to discharge with patient and caregiver  7/30/2025 1456 by Kasandra Meyer RN  Outcome: Progressing     Problem: Pain  Goal: Verbalizes/displays adequate comfort level or baseline comfort level  7/31/2025 0141 by Shell Velasquez RN  Outcome: Progressing  Flowsheets (Taken 7/30/2025 2045)  Verbalizes/displays adequate comfort level or baseline comfort level: Encourage patient to monitor pain and request assistance  7/30/2025 1456 by Kasandra Meyer RN  Outcome: Progressing     Problem: Skin/Tissue Integrity  Goal: Skin integrity remains intact  Description: 1.  Monitor for areas of redness and/or skin breakdown  2.  Assess vascular access sites hourly  3.  Every 4-6 hours minimum:  Change oxygen saturation probe site  4.  Every 4-6 hours:  If on nasal continuous positive airway pressure, respiratory therapy assess nares and determine need for appliance change or resting period  7/31/2025 0141 by Shell Velasquez RN  Outcome:

## 2025-07-31 NOTE — PROGRESS NOTES
Physician Progress Note      PATIENT:               COLLIN SYLVESTER  CSN #:                  [id number]  :                       1940  ADMIT DATE:       2025 9:49 AM  DISCH DATE:  RESPONDING  PROVIDER #:        Lavern Rodgers MD          QUERY TEXT:    UTI is documented in the medical record Internal Medicine Progress Notes by   aLvern Rodgers MD at 2025.  Please clarify the relationship, if any,   with the suprapubic catheter:    The clinical indicators include:  gross hematuria, UTI,    \" gross hematuria in the setting of suprapubic catheter, urology on board,   continuous bladder irrigation  Complicated UTI, with ESBL in the past, multidrug-resistant, on meropenem, ID   on board,\" Internal Medicine Progress Notes by Lavern Rodgers MD at   2025    \"gross hematuria.  She had her suprapubic tube changed this morning, it irrigates readily, but   urine is grossly bloody with clots.\" Urology Consults by Facundo Napoles MD   at 2025    \"Gross hematuria, suprapubic catheter, patient states bladder spasms, dark red   urine began yesterday  had suprapubic catheter placed  for urinary incontinence\" H&P by Lavern Rodgers MD at 2025    urine Culture GRAM NEGATIVE RODS >100,000 CFU/ML Abnormal    \" Acute cystitis with hematuria  Positive for dysuria and hematuria\" ED Provider Notes by Tal Razo MD at 2025    Urine culture, urology consult, meropenem, suprapubic tube changed.    Thank you,  Anita Day S CDS  Options provided:  -- UTI related to suprapubic catheter  -- UTI not related to suprapubic catheter  -- Other - I will add my own diagnosis  -- Disagree - Not applicable / Not valid  -- Disagree - Clinically unable to determine / Unknown  -- Refer to Clinical Documentation Reviewer    PROVIDER RESPONSE TEXT:    UTI is not related to suprapubic catheter.    Query created by: Anita Day on 2025 3:01 AM      Electronically signed by:  Lavern

## 2025-07-31 NOTE — PROGRESS NOTES
[facility]  IN-PATIENT SERVICE  [facility]    PROGRESS NOTE             7/31/2025    7:27 AM    Name:   Komal Crawford  MRN:     [id number]     Acct:      [id number]   Room:   2049/2049-01   Day:  2  Admit Date:  7/29/2025  9:49 AM    PCP:  Geno Mcdonnell PA-C  Code Status:  Full Code    Subjective:     Mrs. Crawford was seen and examined at bedside this AM. Overnight she was afebrile, HR 70-80s, BP systolic 130s-140s. No acute events overnight. Today she states her bladder spasms have improved and are less frequent. She is still undergoing continuous bladder irrigation.     Brief History:     Komal Crafword is an 84 year old female with a past medical history of diabetes mellitus last A1c 6.5% 4/3/25, hypertension, hyperlipidemia, CAD s/p stent placement 2022, L knee prothesis infection, urinary incontinence s/p suprapubic catheter 9/2024, frequent UTIs, chronic constipation s/p end loop colostomy 4/25, TKA infection s/p I&D with antibiotic powder and wound vac - hardware retention, history of ITP, history of CVA 2008. She presented to the ED on 7/29 after about a day of bladder spasms and blood in her urine. She has a suprapubic catheter since 9/24 and the tube was just exchanged. The blood in her urine turned it dark red, the urine was initially pink. She is on aspirin, Plavix. Her bladder spasms are very painful. She states she's had spasms for a few months on and off but it acutely worsened. She states she gets frequent UTIs and still has her left knee hardware after an infection in 2024, she takes daily doxycycline suppression/prophylaxis and every two weeks get clindamycin injections into her bladder. She follows with infectious disease. She states for the past month she has been getting nightly chills. She notes that over the past day she frequently feels like she is having urinary incontinence and notes the urine is bloody, denies burning when this happens.  extraction status of eye, left, Cataract extraction status of right eye, Cellulitis of left lower limb, Cerebral artery occlusion with cerebral infarction ([facility]), Chronic anticoagulation, Chronic back pain, Chronic ITP (idiopathic thrombocytopenia) ([facility]), Constipation, unspecified, CVA (cerebral infarction), Cyst of kidney, acquired, Deficiency of other specified B group vitamins, Diabetic gastroparesis ([facility]), Diaphragmatic hernia without obstruction or gangrene, Diverticular disease, Diverticulosis of intestine without perforation or abscess without bleeding, Dorsalgia, unspecified, Essential tremor, Exudative age-related macular degeneration, left eye, with active choroidal neovascularization ([facility]), Full dentures, Gastroesophageal reflux disease without esophagitis, GERD (gastroesophageal reflux disease), Hiatal hernia, Hip fracture ([facility]), History of blood transfusion, History of decreased platelet count, Hyperlipemia, Hypertension, Immune thrombocytopenic purpura ([facility]), Internal hemorrhoid, Localized edema, Long term (current) use of anticoagulants, Long term (current) use of oral hypoglycemic drugs, Long term current use of aspirin, Long term current use of insulin ([facility]), Macular degeneration, Macular degeneration of left eye, Major depressive disorder, recurrent, in remission, Mixed incontinence, Nausea and vomiting, Neuromuscular dysfunction of bladder, unspecified, Neuropathy, Obesity, unspecified, Occlusion and stenosis of unspecified cerebral artery, Osteoarthritis, Other abnormalities of gait and mobility, Other cervical disc degeneration, unspecified cervical region, Other chronic pain, Other hemorrhoids, Other malaise, Other specified disorders of bladder, Pain in left ankle and joints of left foot, Personal history of colonic polyps, Personal history of healed osteoporosis fracture, Personal history of nicotine dependence, Personal history of transient ischemic attack (TIA), and cerebral infarction

## 2025-07-31 NOTE — PROGRESS NOTES
Exceptions  Safety Judgement: Decreased awareness of need for assistance, Decreased awareness of need for safety   Sensation  Overall Sensation Status: Impaired (BLE neuropathy)  Vision  Vision: Impaired  Vision Exceptions: Wears glasses at all times  Hearing  Hearing: Impaired  Hearing Exceptions: Bilateral hearing aid, Hard of hearing/hearing concerns (Reports L hearing aid broken and R not working at all. Does not don until end of session)         UE Function  LUE AROM (degrees)  LUE AROM : WFL  Left Hand AROM (degrees)  Left Hand AROM: WFL  Tone LUE  LUE Tone: Normotonic  LUE Strength  LUE Strength Comment: Grossly 4-/5 MMT throughout    RUE AROM (degrees)  RUE AROM : WFL  Right Hand AROM (degrees)  Right Hand AROM: WFL  Tone RUE  RUE Tone: Normotonic  RUE Strength  RUE Strength Comment: Grossly 4-/5 MMT throughout    Fine Motor Skills/Coordination  Coordination  Movements Are Fluid And Coordinated: No  Coordination and Movement Description: Fine motor impairments, Right UE, Left UE, Decreased speed              Bed Mobility  Bed mobility  Rolling to Left: Stand by assistance  Supine to Sit: Partial/Moderate assistance  Scooting: Contact guard assistance  Bed Mobility Comments: HOB fully elevated. A to progress LLE and trunk. Increased time.    Balance  Balance  Sitting Balance: Stand by assistance (SBA during footwear management. Total sitting EOB time ~10 mins)  Standing Balance: Minimal assistance  Standing Balance  Time: ~1-2 min  Activity: functional transfers and mobility  Comment: Pt standing EOB with BUE supported on RW to gain footing, LLE unsteady. Fluctuates between CGA/ min A    Transfers  Transfers  Sit to stand: Contact guard assistance  Stand to sit: Contact guard assistance  Transfer Comments: CGA with max time. Verbal cues for safe hand placement. No LOB noted.    Functional Mobility  Functional - Mobility Device: Rolling Walker  Activity: Other (EOB > bedside recliner)  Assist Level: Minimal  Minutes: 24 Minutes    Electronically signed by ELDA An on 7/31/25 at 10:30 AM EDT

## 2025-07-31 NOTE — PROGRESS NOTES
Physical Therapy  [facility]   Physical Therapy Evaluation  Date: 25  Patient Name: Komal Crawford       Room: 9-  MRN: [id number]  Account: [id number]   : 1940  (84 y.o.) Gender: female     Discharge Recommendations:  Discharge Recommendations: Patient would benefit from continued therapy after discharge     PT D/C Equipment  Equipment Needed: No  PT Equipment Recommendations  Equipment Needed:  (Recommending continued use of RW and WC at home)     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HCC), Body mass index 31.0-31.9, adult, Bronchitis, Caffeine use, Cataract extraction status of eye, left, Cataract extraction status of right eye, Cellulitis of left lower limb, Cerebral artery occlusion with cerebral infarction (HCC), Chronic anticoagulation, Chronic back pain, Chronic ITP (idiopathic thrombocytopenia) (HCC), Constipation, unspecified, CVA (cerebral infarction), Cyst of kidney, acquired, Deficiency of other specified B group vitamins, Diabetic gastroparesis (HCC), Diaphragmatic hernia without obstruction or gangrene, Diverticular disease, Diverticulosis of intestine without perforation or abscess without bleeding, Dorsalgia, unspecified, Essential tremor, Exudative age-related macular degeneration, left eye, with active choroidal neovascularization (HCC), Full dentures, Gastroesophageal reflux disease without esophagitis, GERD (gastroesophageal reflux disease), Hiatal hernia, Hip fracture ([facility]), History of blood transfusion, History of decreased platelet count, Hyperlipemia,

## 2025-08-01 ENCOUNTER — APPOINTMENT (OUTPATIENT)
Dept: CT IMAGING | Age: 85
DRG: 690 | End: 2025-08-01
Payer: MEDICARE

## 2025-08-01 LAB
ANION GAP SERPL CALCULATED.3IONS-SCNC: 11 MMOL/L (ref 9–16)
BASOPHILS # BLD: 0.06 K/UL (ref 0–0.2)
BASOPHILS NFR BLD: 1 % (ref 0–2)
BUN SERPL-MCNC: 20 MG/DL (ref 8–23)
CALCIUM SERPL-MCNC: 8.7 MG/DL (ref 8.6–10.4)
CHLORIDE SERPL-SCNC: 105 MMOL/L (ref 98–107)
CO2 SERPL-SCNC: 25 MMOL/L (ref 20–31)
CREAT SERPL-MCNC: 0.8 MG/DL (ref 0.7–1.2)
EOSINOPHIL # BLD: 0.34 K/UL (ref 0–0.44)
EOSINOPHILS RELATIVE PERCENT: 5 % (ref 0–4)
ERYTHROCYTE [DISTWIDTH] IN BLOOD BY AUTOMATED COUNT: 15.4 % (ref 11.5–14.9)
GFR, ESTIMATED: 73 ML/MIN/1.73M2
GLUCOSE BLD-MCNC: 169 MG/DL (ref 65–105)
GLUCOSE BLD-MCNC: 185 MG/DL (ref 65–105)
GLUCOSE BLD-MCNC: 196 MG/DL (ref 65–105)
GLUCOSE BLD-MCNC: 202 MG/DL (ref 65–105)
GLUCOSE SERPL-MCNC: 174 MG/DL (ref 74–99)
HCT VFR BLD AUTO: 28 % (ref 36–46)
HCT VFR BLD AUTO: 29 % (ref 36–46)
HCT VFR BLD AUTO: 30.5 % (ref 36–46)
HCT VFR BLD AUTO: 30.5 % (ref 36–46)
HGB BLD-MCNC: 8.9 G/DL (ref 12–16)
HGB BLD-MCNC: 9 G/DL (ref 12–16)
HGB BLD-MCNC: 9.3 G/DL (ref 12–16)
HGB BLD-MCNC: 9.5 G/DL (ref 12–16)
IMM GRANULOCYTES # BLD AUTO: 0.03 K/UL (ref 0–0.3)
IMM GRANULOCYTES NFR BLD: 1 %
LYMPHOCYTES NFR BLD: 1.45 K/UL (ref 1.1–3.7)
LYMPHOCYTES RELATIVE PERCENT: 22 % (ref 24–44)
MCH RBC QN AUTO: 29.3 PG (ref 26–34)
MCHC RBC AUTO-ENTMCNC: 30.5 G/DL (ref 31–37)
MCV RBC AUTO: 96.2 FL (ref 80–100)
MONOCYTES NFR BLD: 0.63 K/UL (ref 0.1–1.2)
MONOCYTES NFR BLD: 10 % (ref 3–12)
NEUTROPHILS NFR BLD: 61 % (ref 36–66)
NEUTS SEG NFR BLD: 4.08 K/UL (ref 1.5–8.1)
NRBC BLD-RTO: 0 PER 100 WBC
PLATELET # BLD AUTO: ABNORMAL K/UL (ref 150–450)
PLATELET, FLUORESCENCE: 146 K/UL (ref 150–450)
PLATELETS.RETICULATED NFR BLD AUTO: 13.7 % (ref 1.1–10.3)
PMV BLD AUTO: 14.6 FL (ref 8–13.5)
POTASSIUM SERPL-SCNC: 4.3 MMOL/L (ref 3.7–5.3)
RBC # BLD AUTO: 3.17 M/UL (ref 3.95–5.11)
SODIUM SERPL-SCNC: 141 MMOL/L (ref 136–145)
WBC OTHER # BLD: 6.6 K/UL (ref 3.5–11)

## 2025-08-01 PROCEDURE — 99232 SBSQ HOSP IP/OBS MODERATE 35: CPT | Performed by: INTERNAL MEDICINE

## 2025-08-01 PROCEDURE — 85025 COMPLETE CBC W/AUTO DIFF WBC: CPT

## 2025-08-01 PROCEDURE — 85014 HEMATOCRIT: CPT

## 2025-08-01 PROCEDURE — 1200000000 HC SEMI PRIVATE

## 2025-08-01 PROCEDURE — 6360000002 HC RX W HCPCS

## 2025-08-01 PROCEDURE — 2500000003 HC RX 250 WO HCPCS

## 2025-08-01 PROCEDURE — 97110 THERAPEUTIC EXERCISES: CPT

## 2025-08-01 PROCEDURE — 6370000000 HC RX 637 (ALT 250 FOR IP)

## 2025-08-01 PROCEDURE — 80048 BASIC METABOLIC PNL TOTAL CA: CPT

## 2025-08-01 PROCEDURE — 82947 ASSAY GLUCOSE BLOOD QUANT: CPT

## 2025-08-01 PROCEDURE — 2580000003 HC RX 258

## 2025-08-01 PROCEDURE — 74176 CT ABD & PELVIS W/O CONTRAST: CPT

## 2025-08-01 PROCEDURE — G0545 PR INHERENT VISIT TO INPT: HCPCS | Performed by: INTERNAL MEDICINE

## 2025-08-01 PROCEDURE — 99211 OFF/OP EST MAY X REQ PHY/QHP: CPT

## 2025-08-01 PROCEDURE — 99233 SBSQ HOSP IP/OBS HIGH 50: CPT

## 2025-08-01 PROCEDURE — 85018 HEMOGLOBIN: CPT

## 2025-08-01 PROCEDURE — 36415 COLL VENOUS BLD VENIPUNCTURE: CPT

## 2025-08-01 RX ORDER — DOXYCYCLINE 100 MG/1
100 CAPSULE ORAL 2 TIMES DAILY
Qty: 180 CAPSULE | Refills: 0 | Status: SHIPPED | OUTPATIENT
Start: 2025-08-05 | End: 2025-11-03

## 2025-08-01 RX ORDER — NYSTATIN 100000 [USP'U]/ML
5 SUSPENSION ORAL 4 TIMES DAILY
Status: DISCONTINUED | OUTPATIENT
Start: 2025-08-01 | End: 2025-08-01

## 2025-08-01 RX ORDER — NYSTATIN 100000 [USP'U]/ML
5 SUSPENSION ORAL 4 TIMES DAILY
Status: DISCONTINUED | OUTPATIENT
Start: 2025-08-01 | End: 2025-08-02 | Stop reason: HOSPADM

## 2025-08-01 RX ORDER — NYSTATIN 100000 [USP'U]/ML
5 SUSPENSION ORAL 4 TIMES DAILY
Status: CANCELLED | OUTPATIENT
Start: 2025-08-01

## 2025-08-01 RX ADMIN — ATORVASTATIN CALCIUM 10 MG: 10 TABLET, FILM COATED ORAL at 10:04

## 2025-08-01 RX ADMIN — GABAPENTIN 100 MG: 100 CAPSULE ORAL at 21:27

## 2025-08-01 RX ADMIN — INSULIN LISPRO 1 UNITS: 100 INJECTION, SOLUTION INTRAVENOUS; SUBCUTANEOUS at 21:27

## 2025-08-01 RX ADMIN — FUROSEMIDE 20 MG: 20 TABLET ORAL at 10:04

## 2025-08-01 RX ADMIN — MEROPENEM 1000 MG: 1 INJECTION INTRAVENOUS at 05:21

## 2025-08-01 RX ADMIN — NYSTATIN 500000 UNITS: 100000 SUSPENSION ORAL at 21:28

## 2025-08-01 RX ADMIN — SODIUM CHLORIDE, PRESERVATIVE FREE 10 ML: 5 INJECTION INTRAVENOUS at 10:04

## 2025-08-01 RX ADMIN — SODIUM CHLORIDE, PRESERVATIVE FREE 10 ML: 5 INJECTION INTRAVENOUS at 21:28

## 2025-08-01 RX ADMIN — NYSTATIN 500000 UNITS: 100000 SUSPENSION ORAL at 18:02

## 2025-08-01 RX ADMIN — TROSPIUM CHLORIDE 20 MG: 20 TABLET, FILM COATED ORAL at 21:27

## 2025-08-01 RX ADMIN — METOPROLOL SUCCINATE 12.5 MG: 25 TABLET, EXTENDED RELEASE ORAL at 10:04

## 2025-08-01 RX ADMIN — INSULIN LISPRO 1 UNITS: 100 INJECTION, SOLUTION INTRAVENOUS; SUBCUTANEOUS at 18:04

## 2025-08-01 RX ADMIN — ROPINIROLE HYDROCHLORIDE 4 MG: 2 TABLET, FILM COATED ORAL at 21:28

## 2025-08-01 RX ADMIN — PANTOPRAZOLE SODIUM 20 MG: 20 TABLET, DELAYED RELEASE ORAL at 05:23

## 2025-08-01 RX ADMIN — MEROPENEM 1000 MG: 1 INJECTION INTRAVENOUS at 21:34

## 2025-08-01 ASSESSMENT — ENCOUNTER SYMPTOMS
ABDOMINAL PAIN: 0
SORE THROAT: 1
CHEST TIGHTNESS: 0

## 2025-08-01 NOTE — PROGRESS NOTES
Urology Progress Note    Subjective: Patient doing much better today.  Gross hematuria is resolved.  Continuous bladder irrigation has been clamped    Patient Vitals for the past 24 hrs:   BP Temp Pulse Resp SpO2   08/01/25 0622 136/69 -- 81 16 95 %   07/31/25 1813 116/67 98.2 °F (36.8 °C) 83 16 --       Intake/Output Summary (Last 24 hours) at 8/1/2025 1401  Last data filed at 8/1/2025 0900  Gross per 24 hour   Intake 200 ml   Output 1050 ml   Net -850 ml       Recent Labs     07/30/25  0404 07/30/25  0932 07/31/25  0122 07/31/25  0628 07/31/25  1713 08/01/25  0044 08/01/25  0649   WBC 7.2  --  6.2  --   --   --  6.6   HGB 9.1*   < > 9.2*   < > 10.1* 9.0* 9.3*   HCT 29.4*   < > 29.9*   < > 32.1* 28.0* 30.5*   MCV 94.8  --  97.4  --   --   --  96.2   PLT See Reflexed IPF Result  --  See Reflexed IPF Result  --   --   --  See Reflexed IPF Result    < > = values in this interval not displayed.     Recent Labs     07/30/25  0404 07/31/25  0122 08/01/25  0649    138 141   K 4.0 4.0 4.3    104 105   CO2 22 22 25   BUN 16 17 20   CREATININE 0.8 0.7 0.8       No results for input(s): \"COLORU\", \"PHUR\", \"LABCAST\", \"WBCUA\", \"RBCUA\", \"MUCUS\", \"TRICHOMONAS\", \"YEAST\", \"BACTERIA\", \"CLARITYU\", \"SPECGRAV\", \"LEUKOCYTESUR\", \"UROBILINOGEN\", \"BILIRUBINUR\", \"BLOODU\" in the last 72 hours.    Invalid input(s): \"NITRATE\", \"GLUCOSEUKETONESUAMORPHOUS\"    Additional Lab/culture results:     Physical Exam: Winters catheter in place.  Urine yellow and clear.  Continuous bladder irrigation clear.    Interval Imaging Findings:     Renal ultrasound shows mild right pelviectasis.    Impression:    Patient Active Problem List   Diagnosis    TIA (transient ischemic attack)    Chronic back pain    Diabetic peripheral neuropathy (HCC)    Macular degeneration of left eye    Slow transit constipation    Thrombocytopenia    B12 deficiency    Vitamin D deficiency    Anemia    DDD (degenerative disc disease), cervical    Age-related

## 2025-08-01 NOTE — PROGRESS NOTES
10:41 PM    COVID19 Not Detected 11/29/2021 12:18 PM    COVID19 Not Detected 11/03/2021 04:30 PM    COVID19 Not Detected 11/23/2020 01:48 PM    COVID19 Not Detected 07/10/2020 09:55 PM    COVID19 Not Detected 05/08/2020 02:18 PM       No results for input(s): \"VANCOTROUGH\" in the last 72 hours.        Imaging Studies:   US RENAL LIMITED  Result Date: 7/29/2025  EXAM: RETROPERITONEAL ULTRASOUND OF THE KIDNEYS TECHNIQUE: Real-time ultrasonography of the retroperitoneum including the kidneys was performed. COMPARISON: None CLINICAL HISTORY: Gross hematuria. FINDINGS: RIGHT KIDNEY: The right kidney measures 9.2 cm in length. The right kidney demonstrates normal cortical echogenicity. Mild pelvicaliectasis. There are few subcentimeter right renal sinus cysts. No hydronephrosis or intrarenal stones. LEFT KIDNEY: The left kidney measures 9.6 cm in length. The left kidney demonstrates normal cortical echogenicity. No hydronephrosis or intrarenal stones.     1. Mild right pelvicaliectasis.           Medications:      meropenem  1,000 mg IntraVENous Q8H    [Held by provider] aspirin  81 mg Oral Daily    atorvastatin  10 mg Oral Daily    furosemide  20 mg Oral Daily    gabapentin  100 mg Oral Nightly    pantoprazole  20 mg Oral QAM AC    rOPINIRole  4 mg Oral Nightly    sodium chloride flush  5-40 mL IntraVENous 2 times per day    metoprolol succinate  12.5 mg Oral Daily    [Held by provider] clopidogrel  75 mg Oral Daily    insulin lispro  0-4 Units SubCUTAneous 4x Daily AC & HS    trospium  20 mg Oral Nightly         Past History:     Past Medical History:   Diagnosis Date    Acquired absence of both cervix and uterus     Acquired absence of other organs     Age-related cognitive decline     Age-related osteoporosis without current pathological fracture     Ankle pain     Left ankle fracture in ortho boat.    Anxiety     At risk for falling     Atherosclerotic heart disease of native coronary artery without angina pectoris   without mention of complication, not stated as uncontrolled     Under care of team     Neurologist- AT [facility]    Under care of team     Dr. Robbins- Endocrinology    Under care of team     cardiology- Dr. Peña    Unspecified open wound, left lower leg, subsequent encounter     Unsteadiness on feet     Urinary incontinence     frequent UTI s/p infection, surgical dilatation lead to incontinence    UTI (urinary tract infection)     Vitamin D deficiency, unspecified     Weakness     Wears dentures     full on top, partial on bottom    Wears glasses      Past Surgical History:   Procedure Laterality Date    APPENDECTOMY  1962    BACK SURGERY      benign tumor    BLADDER SURGERY      bladder stimulator    BLADDER SUSPENSION  2002    multiple    CARDIAC CATHETERIZATION  2008    2 stents in RCA    CARDIOVASCULAR STRESS TEST  12/2014    CATARACT REMOVAL WITH IMPLANT Left 11/07/2017    [facility]    CATARACT REMOVAL WITH IMPLANT Right 11/28/2017    [facility]    COLON SURGERY      colostomy reversal    COLONOSCOPY  04/07/2016    tubular adenoma x3; internal hemorrhoids    COLONOSCOPY N/A 11/06/2019    COLONOSCOPY DIAGNOSTIC performed by Eli Marinelli MD at [facility] ENDO    COLONOSCOPY  11/06/2019    COLOSTOMY  1986    bowel obstruction/ rupture from diverticular disease, reversal 3 mos later    CORONARY ANGIOPLASTY WITH STENT PLACEMENT  08/04/2022    x2    CYSTOSCOPY  09/08/2017    W/ 200IU Botox     CYSTOSCOPY N/A 09/06/2024    CYSTOSCOPY SUPRAPUBIC TUBE PLACEMENT performed by Omega Lawson MD at [facility] OR    FRACTURE SURGERY Right 2011    hip    FRACTURE SURGERY Left 2001    WRIST    FRACTURE SURGERY Left 2013    ankle    HERNIA REPAIR      HIP SURGERY Right 11/06/2023    HIP HARDWARE REMOVAL - WITH DEEP HARDWARE REMOVAL 7.3 NABILA SCREW   X3 performed by Jon Bonner MD at [facility] OR    HYSTERECTOMY (CERVIX STATUS UNKNOWN)  1969    JOINT REPLACEMENT Bilateral 2000    BILAT KNEES-

## 2025-08-01 NOTE — PROGRESS NOTES
[facility]  Progress Note      NAME:  Komal Crawford  MEDICAL RECORD NUMBER:  [id number]  AGE: 84 y.o.   GENDER: female  : 1940  TODAY'S DATE:  2025    [facility] nursing following for ostomy needs. Patient denies needs at this time. Extra supplies at bedside for future needs. Will continue to follow while inpatient.       For concerns, the [facility] nursing team can be reached via [employer] by searching \"wound ostomy\" under groups and choosing the [facility] option Monday-Friday 8am-4pm.     MARIE Thakur, RN  [facility]  Wound, Ostomy, and Continence Nursing  [phone]    Electronically signed by Gloria Muhammad RN on 2025 at 3:42 PM

## 2025-08-01 NOTE — PROGRESS NOTES
[facility]   INPATIENT OCCUPATIONAL THERAPY  PROGRESS NOTE  Date: 2025  Patient Name: Komal Crawford       Room: -  MRN: [id number]    : 1940  (84 y.o.)  Gender: female   Referring Practitioner: Tavia العلي MD  Diagnosis: Hematuria      Discharge Recommendations:  Further Occupational Therapy is recommended upon facility discharge.    OT Equipment Recommendations  Other: continue to assess    Restrictions/Precautions  Restrictions/Precautions  Restrictions/Precautions: Fall Risk;Contact Precautions (MRSA, MDRO)  Activity Level: Up as Tolerated;Up with Assist  Required Braces or Orthoses?: Yes  Implants Present? : Metal implants (Metal throughout LLE)  Position Activity Restriction  Other Position/Activity Restrictions: Lift on R foot (no longer in use), brace on L foot; s/p open approach colostomy creation on 25    Vitals  O2 Device: None (Room air)    Subjective  Subjective  Subjective: \"That shoe lift used to make my foot go in and I fell twice. I'm just waiting for the call that my new shoes are ready\"  Pain  Pre-Pain: 0  Comments: Pt pleasant and cooperative. OK to see per LETY Peterson.    Objective  Orientation  Overall Orientation Status: Within Functional Limits  Cognition  Overall Cognitive Status: Exceptions  Safety Judgement: Decreased awareness of need for assistance;Decreased awareness of need for safety    Activities of Daily Living  Additional Comments: Pt pleasantly declines ADLs this date, wanting to focus on other aspects of OT. Pt continues to be limited by impaired strength, endurance, balance, and overall activity tolerance impacting safety and independence in daily activities.    Balance  Balance  Sitting Balance: Independent (independently sitting supported in recliner entire session)  Standing Balance  Comment: pt politely declines secondary to L foot pain from extended time in brace prior to session    Transfers/Mobility  Bed

## 2025-08-01 NOTE — CARE COORDINATION
Case Management   Daily Progress Note       Patient Name: Komal Crawford                   YOB: 1940  Diagnosis: Hematuria [R31.9]  Acute cystitis with hematuria [N30.01]                       GMLOS: 2.8 days  Length of Stay: 3  days    Readmission Risk (Low < 19, Mod (19-27), High > 27): Readmission Risk Score: 19      Patient is alert and oriented.    Spoke with patient, and Current Transitional Plan is:    [] Home Independently    [x] Home with HC; [facility].     [] Skilled Nursing Facility    [] Acute Rehabilitation    [] Long Term Acute Care (LTAC)    [] Other:     Medical Management:     ID-IV merrem 1G Q8H, awaiting final cultures. Referral to [facility] sent (awaiting cost), patient states family could assist with this at home.     Urology-CBI continues. NPO, awaiting further recommendations.    Testing Ordered: Cultures pending.    Additional Notes: IMM letter provided to patient.  Patient offered four hours to make informed decision regarding appeal process; patient agreeable to discharge.     Electronically signed by Nisreen Bowers RN on 8/1/2025 at 8:10 AM

## 2025-08-01 NOTE — PROGRESS NOTES
[facility]  IN-PATIENT SERVICE  [facility]    PROGRESS NOTE             8/1/2025    8:04 AM    Name:   Komal Crawford  MRN:     [id number]     Acct:      [id number]   Room:   2049/2049-01   Day:  3  Admit Date:  7/29/2025  9:49 AM    PCP:  Geno Mcdonnell PA-C  Code Status:  Full Code    Subjective:     Pt was seen and examined at bedside. She is complaining of a sore throat today. She is still undergoing continuous bladder irrigation. Her urine is light yellow colored today, no hematuria. On meropenem day 4. Pt denies abdominal pain, chills, SOB, N/V/D, or chest pain.     Brief History:     Komal Crawford is an 84 year old female with a past medical history of diabetes mellitus last A1c 6.5% 4/3/25, hypertension, hyperlipidemia, CAD s/p stent placement 2022, L knee prothesis infection, urinary incontinence s/p suprapubic catheter 9/2024, frequent UTIs, chronic constipation s/p end loop colostomy 4/25, TKA infection s/p I&D with antibiotic powder and wound vac - hardware retention, history of ITP, history of CVA 2008. She presented to the ED on 7/29 after about a day of bladder spasms and blood in her urine. She has a suprapubic catheter since 9/24 and the tube was just exchanged. The blood in her urine turned it dark red, the urine was initially pink. She is on aspirin, Plavix. Her bladder spasms are very painful. She states she's had spasms for a few months on and off but it acutely worsened. She states she gets frequent UTIs and still has her left knee hardware after an infection in 2024, she takes daily doxycycline suppression/prophylaxis and every two weeks get clindamycin injections into her bladder. She follows with infectious disease. She states for the past month she has been getting nightly chills. She notes that over the past day she frequently feels like she is having urinary incontinence and notes the urine is bloody, denies burning when this happens.

## 2025-08-02 VITALS
HEART RATE: 72 BPM | RESPIRATION RATE: 20 BRPM | HEIGHT: 57 IN | BODY MASS INDEX: 32.36 KG/M2 | DIASTOLIC BLOOD PRESSURE: 81 MMHG | SYSTOLIC BLOOD PRESSURE: 116 MMHG | OXYGEN SATURATION: 97 % | TEMPERATURE: 97.9 F | WEIGHT: 150 LBS

## 2025-08-02 DIAGNOSIS — N39.0 RECURRENT UTI: Primary | ICD-10-CM

## 2025-08-02 LAB
ANION GAP SERPL CALCULATED.3IONS-SCNC: 14 MMOL/L (ref 9–16)
BASOPHILS # BLD: 0.08 K/UL (ref 0–0.2)
BASOPHILS NFR BLD: 1 % (ref 0–2)
BUN SERPL-MCNC: 24 MG/DL (ref 8–23)
CALCIUM SERPL-MCNC: 8.9 MG/DL (ref 8.6–10.4)
CHLORIDE SERPL-SCNC: 104 MMOL/L (ref 98–107)
CO2 SERPL-SCNC: 23 MMOL/L (ref 20–31)
CREAT SERPL-MCNC: 0.7 MG/DL (ref 0.7–1.2)
EOSINOPHIL # BLD: 0.38 K/UL (ref 0–0.44)
EOSINOPHILS RELATIVE PERCENT: 5 % (ref 0–4)
ERYTHROCYTE [DISTWIDTH] IN BLOOD BY AUTOMATED COUNT: 15.2 % (ref 11.5–14.9)
GFR, ESTIMATED: 86 ML/MIN/1.73M2
GLUCOSE BLD-MCNC: 154 MG/DL (ref 65–105)
GLUCOSE BLD-MCNC: 252 MG/DL (ref 65–105)
GLUCOSE SERPL-MCNC: 176 MG/DL (ref 74–99)
HCT VFR BLD AUTO: 31.1 % (ref 36–46)
HGB BLD-MCNC: 9.7 G/DL (ref 12–16)
IMM GRANULOCYTES # BLD AUTO: 0.08 K/UL (ref 0–0.3)
IMM GRANULOCYTES NFR BLD: 1 %
LYMPHOCYTES NFR BLD: 1.58 K/UL (ref 1.1–3.7)
LYMPHOCYTES RELATIVE PERCENT: 21 % (ref 24–44)
MCH RBC QN AUTO: 29.3 PG (ref 26–34)
MCHC RBC AUTO-ENTMCNC: 31.2 G/DL (ref 31–37)
MCV RBC AUTO: 94 FL (ref 80–100)
MONOCYTES NFR BLD: 0.68 K/UL (ref 0.1–1.2)
MONOCYTES NFR BLD: 9 % (ref 3–12)
MORPHOLOGY: NORMAL
NEUTROPHILS NFR BLD: 63 % (ref 36–66)
NEUTS SEG NFR BLD: 4.7 K/UL (ref 1.5–8.1)
NRBC BLD-RTO: 0 PER 100 WBC
PLATELET # BLD AUTO: 133 K/UL (ref 150–450)
PMV BLD AUTO: 14.1 FL (ref 8–13.5)
POTASSIUM SERPL-SCNC: 4.4 MMOL/L (ref 3.7–5.3)
RBC # BLD AUTO: 3.31 M/UL (ref 3.95–5.11)
SODIUM SERPL-SCNC: 141 MMOL/L (ref 136–145)
WBC OTHER # BLD: 7.5 K/UL (ref 3.5–11)

## 2025-08-02 PROCEDURE — 2500000003 HC RX 250 WO HCPCS

## 2025-08-02 PROCEDURE — 6360000002 HC RX W HCPCS: Performed by: INTERNAL MEDICINE

## 2025-08-02 PROCEDURE — 80048 BASIC METABOLIC PNL TOTAL CA: CPT

## 2025-08-02 PROCEDURE — 6370000000 HC RX 637 (ALT 250 FOR IP)

## 2025-08-02 PROCEDURE — 36415 COLL VENOUS BLD VENIPUNCTURE: CPT

## 2025-08-02 PROCEDURE — 99239 HOSP IP/OBS DSCHRG MGMT >30: CPT

## 2025-08-02 PROCEDURE — 99233 SBSQ HOSP IP/OBS HIGH 50: CPT | Performed by: INTERNAL MEDICINE

## 2025-08-02 PROCEDURE — 2580000003 HC RX 258

## 2025-08-02 PROCEDURE — 2580000003 HC RX 258: Performed by: INTERNAL MEDICINE

## 2025-08-02 PROCEDURE — 82947 ASSAY GLUCOSE BLOOD QUANT: CPT

## 2025-08-02 PROCEDURE — G0545 PR INHERENT VISIT TO INPT: HCPCS | Performed by: INTERNAL MEDICINE

## 2025-08-02 PROCEDURE — 85025 COMPLETE CBC W/AUTO DIFF WBC: CPT

## 2025-08-02 PROCEDURE — 6360000002 HC RX W HCPCS

## 2025-08-02 RX ORDER — SODIUM CHLORIDE 9 MG/ML
INJECTION, SOLUTION INTRAVENOUS PRN
OUTPATIENT
Start: 2025-08-03

## 2025-08-02 RX ORDER — SODIUM CHLORIDE 0.9 % (FLUSH) 0.9 %
5-40 SYRINGE (ML) INJECTION PRN
OUTPATIENT
Start: 2025-08-03

## 2025-08-02 RX ORDER — SODIUM CHLORIDE 9 MG/ML
5-250 INJECTION, SOLUTION INTRAVENOUS PRN
OUTPATIENT
Start: 2025-08-03

## 2025-08-02 RX ORDER — EPINEPHRINE 1 MG/ML
0.3 INJECTION, SOLUTION, CONCENTRATE INTRAVENOUS PRN
OUTPATIENT
Start: 2025-08-03

## 2025-08-02 RX ORDER — DIPHENHYDRAMINE HYDROCHLORIDE 50 MG/ML
50 INJECTION, SOLUTION INTRAMUSCULAR; INTRAVENOUS
OUTPATIENT
Start: 2025-08-03

## 2025-08-02 RX ORDER — ACETAMINOPHEN 325 MG/1
650 TABLET ORAL
OUTPATIENT
Start: 2025-08-03

## 2025-08-02 RX ORDER — ALBUTEROL SULFATE 90 UG/1
4 INHALANT RESPIRATORY (INHALATION) PRN
OUTPATIENT
Start: 2025-08-03

## 2025-08-02 RX ORDER — HYDROCORTISONE SODIUM SUCCINATE 100 MG/2ML
100 INJECTION INTRAMUSCULAR; INTRAVENOUS
OUTPATIENT
Start: 2025-08-03

## 2025-08-02 RX ORDER — HEPARIN 100 UNIT/ML
500 SYRINGE INTRAVENOUS PRN
OUTPATIENT
Start: 2025-08-03

## 2025-08-02 RX ORDER — ONDANSETRON 2 MG/ML
8 INJECTION INTRAMUSCULAR; INTRAVENOUS
OUTPATIENT
Start: 2025-08-03

## 2025-08-02 RX ADMIN — SODIUM CHLORIDE, PRESERVATIVE FREE 10 ML: 5 INJECTION INTRAVENOUS at 08:39

## 2025-08-02 RX ADMIN — NYSTATIN 500000 UNITS: 100000 SUSPENSION ORAL at 08:39

## 2025-08-02 RX ADMIN — POLYETHYLENE GLYCOL 3350 17 G: 17 POWDER, FOR SOLUTION ORAL at 09:41

## 2025-08-02 RX ADMIN — FUROSEMIDE 20 MG: 20 TABLET ORAL at 08:39

## 2025-08-02 RX ADMIN — MEROPENEM 1000 MG: 1 INJECTION INTRAVENOUS at 09:41

## 2025-08-02 RX ADMIN — METOPROLOL SUCCINATE 12.5 MG: 25 TABLET, EXTENDED RELEASE ORAL at 08:39

## 2025-08-02 RX ADMIN — PANTOPRAZOLE SODIUM 20 MG: 20 TABLET, DELAYED RELEASE ORAL at 06:56

## 2025-08-02 RX ADMIN — INSULIN LISPRO 2 UNITS: 100 INJECTION, SOLUTION INTRAVENOUS; SUBCUTANEOUS at 12:17

## 2025-08-02 RX ADMIN — ATORVASTATIN CALCIUM 10 MG: 10 TABLET, FILM COATED ORAL at 08:39

## 2025-08-02 RX ADMIN — ERTAPENEM SODIUM 1000 MG: 1 INJECTION INTRAMUSCULAR; INTRAVENOUS at 13:13

## 2025-08-02 RX ADMIN — NYSTATIN 500000 UNITS: 100000 SUSPENSION ORAL at 12:17

## 2025-08-02 ASSESSMENT — ENCOUNTER SYMPTOMS
CHEST TIGHTNESS: 0
SORE THROAT: 1
ABDOMINAL PAIN: 0

## 2025-08-02 NOTE — PROGRESS NOTES
[facility]  Progress Note    Komal Crawford  MRN: [id number]  Date: 8/2/2025  LOS: 4     Reason for Consultation/Summary of Stay:   UTI     Assessment :   Klebsiella pneumonia UTI  Urinary incontinence ,suprapubic catheter in place was changed 7/29/2025  Hematuria on bladder irrigation  Left  knee infected prosthesis status post irrigation and debridement with application of vancomycin powder and wound VAC 3/8/2024 left knee wound culture and intraoperative culture grew MRSA, vancomycin SARAH of 1, wound healed  Status post left total hip arthroplasty/displaced periprosthetic femur fracture status post left total knee arthroplasty revision 1/23/24  History of ITP  History of CVA  Diabetes mellitus  Hypertension  Hyperlipidemia  Multiple antibiotics allergy  History of multidrug-resistant organism   Cephalosporin, sulfa penicillin, Cipro, doxycycline and clindamycin allergy  Status post colostomy  Coronary artery disease    Plan:     Discontinue IV Meropenem  IV ertapenem 1 g daily through 8/4/2025, could be done through peripheral line at the infusion center.  Prescription reconciled  Discussed with case management  Patient to resume her chronic suppression therapy with oral Doxycycline for previous PJI (3/8/24) after completion of UTI treatment.   No objection for discharge from infectious disease point of view  Follow-up in 2 weeks        Infection Control Recommendations:     [place] Precautions  Contact Isolation     Antimicrobial Stewardship Recommendations:   Simplification of therapy  Targeted therapy    Discharge Planning:   Estimated Length of IV antimicrobials: TBD  Patient will need Midline Catheter Insertion/ PICC line Insertion: No  Patient will need: Home IV , Infusion Center,  SNF,  LTAC: Undetermined  Patient willneed outpatient wound care: No      History of Present Illness:   Komal Crawford is a 84 y.o.-year-old female who was admitted on 7/29/2025.  The patient presented to hospital  document that actually reflects the content of the visit, the document can still have some errors including those of syntax and sound a like substitutions which may escape proof reading.  In such instances, actual meaning can be extrapolated by contextual diversion.          Electronically signed by David Arteaga MD

## 2025-08-02 NOTE — CASE COMMUNICATION
Case Management   Daily Progress Note       Patient Name: Komal Crawford                   YOB: 1940  Diagnosis: Hematuria [R31.9]  Acute cystitis with hematuria [N30.01]                       GMLOS: 2.8 days  Length of Stay: 4  days    Readmission Risk (Low < 19, Mod (19-27), High > 27): Readmission Risk Score: 19.1      Chart Reviewed, and Current Transitional Plan is:    [] Home Independently    [x] Home with  - [facility]'s.    [] Skilled Nursing Facility    [] Acute Rehabilitation    [] Long Term Acute Care (LTAC)    [] Other:     Additional Notes:     Remains on IV Merrem 1gm every 12 hours. Urine culture back, growing Klebsiella Pneumoniae with sensitivity back.    Has 3 way reeves and SP cath as well.    Electronically signed by Belgica Rodney RN on 8/2/2025 at 9:05 AM

## 2025-08-02 NOTE — DISCHARGE INSTRUCTIONS
Follow up with your primary care physician or next available provider at [facility], in 1 week  Follow up with Geno Mcdonnell PA-C  in 1-2 weeks   Follow up with urology outpatient   Take all your medication as prescribed. If your prescription has refills, obtain the medication refills from the pharmacy before you run out. Seek medical attention if you run out of a medication you need and do not have any refills of.   Reasons to call your doctor or go to the ER:  chest pain or any other concerning symptoms.

## 2025-08-02 NOTE — PLAN OF CARE
Problem: Safety - Adult  Goal: Free from fall injury  Outcome: Progressing     Problem: Chronic Conditions and Co-morbidities  Goal: Patient's chronic conditions and co-morbidity symptoms are monitored and maintained or improved  Outcome: Progressing     Problem: Pain  Goal: Verbalizes/displays adequate comfort level or baseline comfort level  Outcome: Progressing     Problem: Musculoskeletal - Adult  Goal: Return mobility to safest level of function  Outcome: Progressing     Problem: Genitourinary - Adult  Goal: Absence of urinary retention  Outcome: Progressing  Goal: Urinary catheter remains patent  Outcome: Progressing

## 2025-08-02 NOTE — PROGRESS NOTES
[facility]  IN-PATIENT SERVICE  [facility]    PROGRESS NOTE             8/2/2025    8:58 AM    Name:   oKmal Crawford  MRN:     [id number]     Acct:      [id number]   Room:   2049/2049-01   Day:  4  Admit Date:  7/29/2025  9:49 AM    PCP:  Geno Mcdonnell PA-C  Code Status:  Full Code    Subjective:     Patient seen and examined at bedside.  She is complaining of fatigue and sleepiness.  Otherwise patient is doing well and urine color back to normal. Last checked HR 74 bpm. On meropenem day 5 last day. Pt denies abdominal pain, chills, SOB, N/V/D, or chest pain.     Brief History:     Komal Crawford is an 84 year old female with a past medical history of diabetes mellitus last A1c 6.5% 4/3/25, hypertension, hyperlipidemia, CAD s/p stent placement 2022, L knee prothesis infection, urinary incontinence s/p suprapubic catheter 9/2024, frequent UTIs, chronic constipation s/p end loop colostomy 4/25, TKA infection s/p I&D with antibiotic powder and wound vac - hardware retention, history of ITP, history of CVA 2008. She presented to the ED on 7/29 after about a day of bladder spasms and blood in her urine. She has a suprapubic catheter since 9/24 and the tube was just exchanged. The blood in her urine turned it dark red, the urine was initially pink. She is on aspirin, Plavix. Her bladder spasms are very painful. She states she's had spasms for a few months on and off but it acutely worsened. She states she gets frequent UTIs and still has her left knee hardware after an infection in 2024, she takes daily doxycycline suppression/prophylaxis and every two weeks get clindamycin injections into her bladder. She follows with infectious disease. She states for the past month she has been getting nightly chills. She notes that over the past day she frequently feels like she is having urinary incontinence and notes the urine is bloody, denies burning when this happens. Otherwise  Hgb is stable   - hemodynamically stable   - likely due to bleeding   - H&H q6 and continue to monitor, transfuse hgb <7      UTI    - patient has history of chronic UTIs   - receives biweekly clindamycin bladder injections   - UA 7/29: moderate leukocyte esterase, numerous WBC, moderate bacteria   - infectious disease consulted: discontinue IV meropenem 8/2  - 7/29 urine culture growing gram negative rods   - 7/29 blood culture NGTD   - Per urology, no need for more antibiotics  - IV infusion of Invanz as outpatient ordered by uro     History of L knee prosthetic infection 2024  - s/p I&D with antibiotic instillment, still retains original hardware   - follows with infectious disease   - on daily doxycycline prophylaxis     Diabetes mellitus   - last A1c 6.5% 4/3/25  - low dose sliding scale      End loop colostomy   - created 4/25 for chronic constipation, fecal incontinence   - normal output   - wound ostomy consulted     CAD s/p stents 2022   - stress test 2023 negative   - last echo on file 3/23 normal LV function   - On aspirin and plavix, held in setting of bleeding      Restless leg syndrome   - continue home ropinerole, gabapentin      History of hypertension   - continue home metoprolol       DVT prophylaxis: held in setting of bleeding   GI prophylaxis: protonix 20       Jose J Riojas MD  PGY I Transitional Year  8/2/2025 8:58 AM     Attending Physician Statement    Electronically signed by Jose J Riojas MD     Attending Physician Statement  I have discussed the care of Komal Crawford and I have examined the patient myself and taken ROS and HPI, including pertinent history and exam findings, with the resident. I have reviewed the key elements of all parts of the encounter with the resident.  I agree with the assessment, plan and orders as documented by the resident.    Cleared for discharge.  Okay for aspirin and Plavix.  Follow-up with urology as outpatient.    Electronically signed by Rowan Dawson MD

## 2025-08-02 NOTE — PROGRESS NOTES
Discharge instructions reviewed with patient and  multiple times. Patient verbalizes understanding and completes teach back for discharge instructions including when and where to show up for out patient infusions and when to resume doxycycline

## 2025-08-02 NOTE — CARE COORDINATION
DISCHARGE PLANNING NOTE:    Spoke with Dr. Arteaga who states pt can be discharged home on 2 more days of IV Invanz. Spoke with patient who called her friend, Anand. He can transport patient here tomorrow and Monday for dose of IV Invanz.  Anand states he can be here at 3:30pm to pick patient up. Per Dr. Arteaga, pt will need PO Doxy 100mg BID for 3 months. This order has already been placed on d/c to start 8/5/25 by ID CYNTHIA Means.    Called pharmacy and spoke with Mayra who will place first dose of IV Invanz here.    Fax[facility].    Updated Dr. Dawson on the above plan who states pt is on resident case load. Called residents and updated them on the above.    Updated primary care RN, Nicholas, on the above as well.    Electronically signed by Belgica Rodney RN on 8/2/2025 at 12:13 PM    AVS uploaded to [facility].    Electronically signed by Belgica Rodney RN on 8/2/2025 at 1:16 PM

## 2025-08-02 NOTE — DISCHARGE SUMMARY
[facility]   IN-PATIENT SERVICE   [facility]    Discharge Summary     Patient ID: Komal Crawford  :  1940   MRN: [id number]     ACCOUNT:  [id number]   Patient's PCP: Geno Mcdonnell PA-C  Admit Date: 2025   Discharge Date: 2025     Length of Stay: 4  Code Status:  Full Code  Admitting Physician: Lavern Rodgers MD  Discharge Physician: Jose J Riojas MD     Active Discharge Diagnoses:       Primary Problem  Hematuria      Hospital Problems  Active Hospital Problems    Diagnosis Date Noted    Hematuria [R31.9] 2025    Acute cystitis with hematuria [N30.01] 2024       Admission Condition:  good     Discharged Condition: good    Hospital Stay:       Hospital Course:  Komal Crawford is a 84 y.o. female who was admitted for the management of   Hematuria , presented to ER with hematuria and abdominal pain.  Patient was also found to have a UTI.  During the course of hospitalization the patient was having bladder irrigations, she was also placed on IV meropenem for 5 days.  The patient had no complications during the stay.  Her urine is back to normal. she was to follow outpatient for of IV infusion of Invanz.         Significant therapeutic interventions: bladder irrigation    Significant Diagnostic Studies:   Labs / Micro:  CBC:   Lab Results   Component Value Date/Time    WBC 7.5 2025 06:44 AM    RBC 3.31 2025 06:44 AM    RBC 4.16 2023 08:59 AM    HGB 9.7 2025 06:44 AM    HCT 31.1 2025 06:44 AM    MCV 94.0 2025 06:44 AM    MCH 29.3 2025 06:44 AM    MCHC 31.2 2025 06:44 AM    RDW 15.2 2025 06:44 AM     2025 06:44 AM         urine culture: positive for Klebsiella     Radiology:  CT ABDOMEN PELVIS WO CONTRAST Additional Contrast? None  Result Date: 2025  EXAM: CT ABDOMEN AND PELVIS WITHOUT CONTRAST 2025 06:21:03 PM TECHNIQUE: CT of the abdomen and pelvis was performed without the  administration of intravenous contrast. Multiplanar reformatted images are provided for review. Automated exposure control, iterative reconstruction, and/or weight based adjustment of the mA/kV was utilized to reduce the radiation dose to as low as reasonably achievable. COMPARISON: 10/2/2024 CLINICAL HISTORY: Hematuria. FINDINGS: LOWER CHEST: No acute abnormality. LIVER: Too small to characterize low attenuating hepatic lesion in the medial segment. GALLBLADDER AND BILE DUCTS: Gallbladder is unremarkable. No biliary ductal dilatation. SPLEEN: No acute abnormality. PANCREAS: No acute abnormality. ADRENAL GLANDS: No acute abnormality. KIDNEYS, URETERS AND BLADDER: Bilateral renal sinus cysts. No hydronephrosis. No perinephric fat infiltration. No renal calculus. Suprapubic Winters catheter is present. An additional catheter is also visualized in the pelvis presumably in the urethra though the distal aspect is not visualized due to streak artifact from bilateral hip arthroplasties. GI AND BOWEL: Prior left hemicolectomy. End transverse colostomy in the left lower quadrant. Parastomal hernia containing small bowel. No obstruction or strangulation. PERITONEUM AND RETROPERITONEUM: No ascites. No free air. VASCULATURE: Aorta is normal in caliber. LYMPH NODES: No lymphadenopathy. REPRODUCTIVE ORGANS: No acute abnormality. BONES AND SOFT TISSUES: Degenerative grade 2 anterolisthesis of L5 on S1. Posterior fusion hardware and intervertebral disc spacer at L1-2. Bilateral total hip arthroplasties produce marked streak artifact in the pelvis.     1. No acute findings in the abdomen or pelvis. 2. End transverse colostomy in the left lower quadrant with parastomal hernia containing small bowel. No obstruction or strangulation.     US RENAL LIMITED  Result Date: 7/29/2025  EXAM: RETROPERITONEAL ULTRASOUND OF THE KIDNEYS TECHNIQUE: Real-time ultrasonography of the retroperitoneum including the kidneys was performed. COMPARISON: None

## 2025-08-03 ENCOUNTER — HOSPITAL ENCOUNTER (OUTPATIENT)
Age: 85
Discharge: HOME | End: 2025-08-03
Attending: INTERNAL MEDICINE | Admitting: INTERNAL MEDICINE
Payer: MEDICARE

## 2025-08-03 VITALS
SYSTOLIC BLOOD PRESSURE: 128 MMHG | HEART RATE: 79 BPM | TEMPERATURE: 98.7 F | DIASTOLIC BLOOD PRESSURE: 72 MMHG | OXYGEN SATURATION: 100 % | RESPIRATION RATE: 18 BRPM

## 2025-08-03 DIAGNOSIS — N39.0 RECURRENT UTI: Primary | ICD-10-CM

## 2025-08-03 LAB
MICROORGANISM SPEC CULT: ABNORMAL
MICROORGANISM SPEC CULT: ABNORMAL
MICROORGANISM SPEC CULT: NORMAL
MICROORGANISM SPEC CULT: NORMAL
SERVICE CMNT-IMP: NORMAL
SERVICE CMNT-IMP: NORMAL
SPECIMEN DESCRIPTION: ABNORMAL
SPECIMEN DESCRIPTION: NORMAL
SPECIMEN DESCRIPTION: NORMAL

## 2025-08-03 PROCEDURE — 2580000003 HC RX 258: Performed by: INTERNAL MEDICINE

## 2025-08-03 PROCEDURE — 6360000002 HC RX W HCPCS: Performed by: INTERNAL MEDICINE

## 2025-08-03 PROCEDURE — 96374 THER/PROPH/DIAG INJ IV PUSH: CPT

## 2025-08-03 RX ORDER — ALBUTEROL SULFATE 90 UG/1
4 INHALANT RESPIRATORY (INHALATION) PRN
Status: CANCELLED | OUTPATIENT
Start: 2025-08-04

## 2025-08-03 RX ORDER — ACETAMINOPHEN 325 MG/1
650 TABLET ORAL
Status: CANCELLED | OUTPATIENT
Start: 2025-08-04

## 2025-08-03 RX ORDER — HYDROCORTISONE SODIUM SUCCINATE 100 MG/2ML
100 INJECTION INTRAMUSCULAR; INTRAVENOUS
Status: CANCELLED | OUTPATIENT
Start: 2025-08-04

## 2025-08-03 RX ORDER — SODIUM CHLORIDE 9 MG/ML
INJECTION, SOLUTION INTRAVENOUS PRN
Status: CANCELLED | OUTPATIENT
Start: 2025-08-04

## 2025-08-03 RX ORDER — HEPARIN 100 UNIT/ML
500 SYRINGE INTRAVENOUS PRN
Status: CANCELLED | OUTPATIENT
Start: 2025-08-04

## 2025-08-03 RX ORDER — DIPHENHYDRAMINE HYDROCHLORIDE 50 MG/ML
50 INJECTION, SOLUTION INTRAMUSCULAR; INTRAVENOUS
Status: CANCELLED | OUTPATIENT
Start: 2025-08-04

## 2025-08-03 RX ORDER — SODIUM CHLORIDE 0.9 % (FLUSH) 0.9 %
5-40 SYRINGE (ML) INJECTION PRN
Status: CANCELLED | OUTPATIENT
Start: 2025-08-04

## 2025-08-03 RX ORDER — EPINEPHRINE 1 MG/ML
0.3 INJECTION, SOLUTION, CONCENTRATE INTRAVENOUS PRN
Status: CANCELLED | OUTPATIENT
Start: 2025-08-04

## 2025-08-03 RX ORDER — SODIUM CHLORIDE 9 MG/ML
5-250 INJECTION, SOLUTION INTRAVENOUS PRN
Status: CANCELLED | OUTPATIENT
Start: 2025-08-04

## 2025-08-03 RX ORDER — ONDANSETRON 2 MG/ML
8 INJECTION INTRAMUSCULAR; INTRAVENOUS
Status: CANCELLED | OUTPATIENT
Start: 2025-08-04

## 2025-08-03 RX ADMIN — ERTAPENEM SODIUM 1000 MG: 1 INJECTION INTRAMUSCULAR; INTRAVENOUS at 13:04

## 2025-08-04 ENCOUNTER — CARE COORDINATION (OUTPATIENT)
Dept: CARE COORDINATION | Age: 85
End: 2025-08-04

## 2025-08-04 ENCOUNTER — HOSPITAL ENCOUNTER (OUTPATIENT)
Dept: INFUSION THERAPY | Age: 85
Setting detail: INFUSION SERIES
Discharge: HOME | End: 2025-08-04
Payer: MEDICARE

## 2025-08-04 VITALS
SYSTOLIC BLOOD PRESSURE: 114 MMHG | DIASTOLIC BLOOD PRESSURE: 91 MMHG | TEMPERATURE: 97.3 F | RESPIRATION RATE: 17 BRPM | HEART RATE: 87 BPM | OXYGEN SATURATION: 98 %

## 2025-08-04 DIAGNOSIS — N39.0 RECURRENT UTI: Primary | ICD-10-CM

## 2025-08-04 PROCEDURE — 96365 THER/PROPH/DIAG IV INF INIT: CPT

## 2025-08-04 PROCEDURE — 2580000003 HC RX 258: Performed by: INTERNAL MEDICINE

## 2025-08-04 PROCEDURE — 6360000002 HC RX W HCPCS: Performed by: INTERNAL MEDICINE

## 2025-08-04 RX ORDER — SODIUM CHLORIDE 9 MG/ML
5-250 INJECTION, SOLUTION INTRAVENOUS PRN
Status: DISCONTINUED | OUTPATIENT
Start: 2025-08-04 | End: 2025-08-05 | Stop reason: HOSPADM

## 2025-08-04 RX ORDER — HEPARIN 100 UNIT/ML
500 SYRINGE INTRAVENOUS PRN
Status: CANCELLED | OUTPATIENT
Start: 2025-08-10

## 2025-08-04 RX ORDER — DIPHENHYDRAMINE HYDROCHLORIDE 50 MG/ML
50 INJECTION, SOLUTION INTRAMUSCULAR; INTRAVENOUS
Status: CANCELLED | OUTPATIENT
Start: 2025-08-10

## 2025-08-04 RX ORDER — SODIUM CHLORIDE 9 MG/ML
5-250 INJECTION, SOLUTION INTRAVENOUS PRN
Status: CANCELLED | OUTPATIENT
Start: 2025-08-10

## 2025-08-04 RX ORDER — EPINEPHRINE 1 MG/ML
0.3 INJECTION, SOLUTION, CONCENTRATE INTRAVENOUS PRN
Status: CANCELLED | OUTPATIENT
Start: 2025-08-10

## 2025-08-04 RX ORDER — ACETAMINOPHEN 325 MG/1
650 TABLET ORAL
Status: CANCELLED | OUTPATIENT
Start: 2025-08-10

## 2025-08-04 RX ORDER — ONDANSETRON 2 MG/ML
8 INJECTION INTRAMUSCULAR; INTRAVENOUS
Status: CANCELLED | OUTPATIENT
Start: 2025-08-10

## 2025-08-04 RX ORDER — ALBUTEROL SULFATE 90 UG/1
4 INHALANT RESPIRATORY (INHALATION) PRN
Status: CANCELLED | OUTPATIENT
Start: 2025-08-10

## 2025-08-04 RX ORDER — HYDROCORTISONE SODIUM SUCCINATE 100 MG/2ML
100 INJECTION INTRAMUSCULAR; INTRAVENOUS
Status: CANCELLED | OUTPATIENT
Start: 2025-08-10

## 2025-08-04 RX ORDER — SODIUM CHLORIDE 9 MG/ML
INJECTION, SOLUTION INTRAVENOUS PRN
Status: CANCELLED | OUTPATIENT
Start: 2025-08-10

## 2025-08-04 RX ORDER — SODIUM CHLORIDE 0.9 % (FLUSH) 0.9 %
5-40 SYRINGE (ML) INJECTION PRN
Status: CANCELLED | OUTPATIENT
Start: 2025-08-10

## 2025-08-04 RX ADMIN — ERTAPENEM SODIUM 1000 MG: 1 INJECTION INTRAMUSCULAR; INTRAVENOUS at 11:22

## 2025-08-04 RX ADMIN — SODIUM CHLORIDE 200 ML/HR: 0.9 INJECTION, SOLUTION INTRAVENOUS at 11:40

## 2025-08-05 ENCOUNTER — CARE COORDINATION (OUTPATIENT)
Dept: CARE COORDINATION | Age: 85
End: 2025-08-05

## 2025-08-05 DIAGNOSIS — N39.0 RECURRENT UTI: Primary | ICD-10-CM

## 2025-08-05 RX ORDER — HYDROCORTISONE SODIUM SUCCINATE 100 MG/2ML
100 INJECTION INTRAMUSCULAR; INTRAVENOUS
Status: CANCELLED | OUTPATIENT
Start: 2025-08-05

## 2025-08-05 RX ORDER — SODIUM CHLORIDE 0.9 % (FLUSH) 0.9 %
5-40 SYRINGE (ML) INJECTION PRN
Status: CANCELLED | OUTPATIENT
Start: 2025-08-05

## 2025-08-05 RX ORDER — EPINEPHRINE 1 MG/ML
0.3 INJECTION, SOLUTION, CONCENTRATE INTRAVENOUS PRN
Status: CANCELLED | OUTPATIENT
Start: 2025-08-05

## 2025-08-05 RX ORDER — HEPARIN 100 UNIT/ML
500 SYRINGE INTRAVENOUS PRN
Status: CANCELLED | OUTPATIENT
Start: 2025-08-05

## 2025-08-05 RX ORDER — DIPHENHYDRAMINE HYDROCHLORIDE 50 MG/ML
50 INJECTION, SOLUTION INTRAMUSCULAR; INTRAVENOUS
Status: CANCELLED | OUTPATIENT
Start: 2025-08-05

## 2025-08-05 RX ORDER — ONDANSETRON 2 MG/ML
8 INJECTION INTRAMUSCULAR; INTRAVENOUS
Status: CANCELLED | OUTPATIENT
Start: 2025-08-05

## 2025-08-05 RX ORDER — ACETAMINOPHEN 325 MG/1
650 TABLET ORAL
Status: CANCELLED | OUTPATIENT
Start: 2025-08-05

## 2025-08-05 RX ORDER — SODIUM CHLORIDE 9 MG/ML
5-250 INJECTION, SOLUTION INTRAVENOUS PRN
Status: CANCELLED | OUTPATIENT
Start: 2025-08-05

## 2025-08-05 RX ORDER — ALBUTEROL SULFATE 90 UG/1
4 INHALANT RESPIRATORY (INHALATION) PRN
Status: CANCELLED | OUTPATIENT
Start: 2025-08-05

## 2025-08-05 RX ORDER — SODIUM CHLORIDE 9 MG/ML
INJECTION, SOLUTION INTRAVENOUS PRN
Status: CANCELLED | OUTPATIENT
Start: 2025-08-05

## 2025-08-15 ENCOUNTER — OFFICE VISIT (OUTPATIENT)
Dept: PRIMARY CARE CLINIC | Age: 85
End: 2025-08-15

## 2025-08-15 ENCOUNTER — TELEPHONE (OUTPATIENT)
Dept: PRIMARY CARE CLINIC | Age: 85
End: 2025-08-15

## 2025-08-15 VITALS
HEART RATE: 68 BPM | SYSTOLIC BLOOD PRESSURE: 110 MMHG | OXYGEN SATURATION: 97 % | HEIGHT: 57 IN | BODY MASS INDEX: 32.46 KG/M2 | DIASTOLIC BLOOD PRESSURE: 68 MMHG

## 2025-08-15 DIAGNOSIS — R31.9 HEMATURIA, UNSPECIFIED TYPE: ICD-10-CM

## 2025-08-15 DIAGNOSIS — Z93.3 COLOSTOMY PRESENT (HCC): ICD-10-CM

## 2025-08-15 DIAGNOSIS — Z09 HOSPITAL DISCHARGE FOLLOW-UP: Primary | ICD-10-CM

## 2025-08-15 ASSESSMENT — PATIENT HEALTH QUESTIONNAIRE - PHQ9
SUM OF ALL RESPONSES TO PHQ QUESTIONS 1-9: 2
7. TROUBLE CONCENTRATING ON THINGS, SUCH AS READING THE NEWSPAPER OR WATCHING TELEVISION: NOT AT ALL
1. LITTLE INTEREST OR PLEASURE IN DOING THINGS: SEVERAL DAYS
3. TROUBLE FALLING OR STAYING ASLEEP: NOT AT ALL
SUM OF ALL RESPONSES TO PHQ QUESTIONS 1-9: 2
6. FEELING BAD ABOUT YOURSELF - OR THAT YOU ARE A FAILURE OR HAVE LET YOURSELF OR YOUR FAMILY DOWN: NOT AT ALL
SUM OF ALL RESPONSES TO PHQ QUESTIONS 1-9: 2
2. FEELING DOWN, DEPRESSED OR HOPELESS: SEVERAL DAYS
4. FEELING TIRED OR HAVING LITTLE ENERGY: NOT AT ALL
SUM OF ALL RESPONSES TO PHQ QUESTIONS 1-9: 2
10. IF YOU CHECKED OFF ANY PROBLEMS, HOW DIFFICULT HAVE THESE PROBLEMS MADE IT FOR YOU TO DO YOUR WORK, TAKE CARE OF THINGS AT HOME, OR GET ALONG WITH OTHER PEOPLE: NOT DIFFICULT AT ALL
8. MOVING OR SPEAKING SO SLOWLY THAT OTHER PEOPLE COULD HAVE NOTICED. OR THE OPPOSITE, BEING SO FIGETY OR RESTLESS THAT YOU HAVE BEEN MOVING AROUND A LOT MORE THAN USUAL: NOT AT ALL
5. POOR APPETITE OR OVEREATING: NOT AT ALL
9. THOUGHTS THAT YOU WOULD BE BETTER OFF DEAD, OR OF HURTING YOURSELF: NOT AT ALL

## 2025-08-20 ENCOUNTER — OFFICE VISIT (OUTPATIENT)
Age: 85
End: 2025-08-20
Payer: MEDICARE

## 2025-08-20 DIAGNOSIS — R33.9 URINARY RETENTION: ICD-10-CM

## 2025-08-20 DIAGNOSIS — N31.9 NEUROGENIC BLADDER: Primary | ICD-10-CM

## 2025-08-20 DIAGNOSIS — R31.0 GROSS HEMATURIA: ICD-10-CM

## 2025-08-20 DIAGNOSIS — N32.89 BLADDER SPASMS: ICD-10-CM

## 2025-08-20 DIAGNOSIS — N28.1 BILATERAL RENAL CYSTS: ICD-10-CM

## 2025-08-20 PROCEDURE — 1090F PRES/ABSN URINE INCON ASSESS: CPT | Performed by: SPECIALIST

## 2025-08-20 PROCEDURE — 1111F DSCHRG MED/CURRENT MED MERGE: CPT | Performed by: SPECIALIST

## 2025-08-20 PROCEDURE — 1036F TOBACCO NON-USER: CPT | Performed by: SPECIALIST

## 2025-08-20 PROCEDURE — 1159F MED LIST DOCD IN RCRD: CPT | Performed by: SPECIALIST

## 2025-08-20 PROCEDURE — 99214 OFFICE O/P EST MOD 30 MIN: CPT | Performed by: SPECIALIST

## 2025-08-20 PROCEDURE — 1123F ACP DISCUSS/DSCN MKR DOCD: CPT | Performed by: SPECIALIST

## 2025-08-20 PROCEDURE — G8427 DOCREV CUR MEDS BY ELIG CLIN: HCPCS | Performed by: SPECIALIST

## 2025-08-20 PROCEDURE — G8417 CALC BMI ABV UP PARAM F/U: HCPCS | Performed by: SPECIALIST

## 2025-08-20 PROCEDURE — G8400 PT W/DXA NO RESULTS DOC: HCPCS | Performed by: SPECIALIST

## 2025-08-21 ENCOUNTER — TELEPHONE (OUTPATIENT)
Dept: WOUND CARE | Age: 85
End: 2025-08-21

## 2025-08-22 ENCOUNTER — TELEPHONE (OUTPATIENT)
Dept: PRIMARY CARE CLINIC | Age: 85
End: 2025-08-22

## 2025-08-22 DIAGNOSIS — M81.0 OSTEOPOROSIS, POST-MENOPAUSAL: Primary | ICD-10-CM

## 2025-08-25 ENCOUNTER — TELEPHONE (OUTPATIENT)
Age: 85
End: 2025-08-25

## 2025-08-25 ENCOUNTER — CARE COORDINATION (OUTPATIENT)
Dept: CARE COORDINATION | Age: 85
End: 2025-08-25

## 2025-08-26 RX ORDER — ERGOCALCIFEROL 1.25 MG/1
CAPSULE, LIQUID FILLED ORAL
Qty: 4 CAPSULE | Refills: 2 | Status: SHIPPED | OUTPATIENT
Start: 2025-08-26

## 2025-08-27 LAB — 25(OH)D3 SERPL-MCNC: 27.4 NG/ML (ref 30–100)

## 2025-09-04 ENCOUNTER — HOSPITAL ENCOUNTER (OUTPATIENT)
Dept: WOMENS IMAGING | Age: 85
Discharge: HOME | End: 2025-09-06
Payer: MEDICARE

## 2025-09-04 DIAGNOSIS — M81.0 OSTEOPOROSIS, POST-MENOPAUSAL: ICD-10-CM

## 2025-09-04 PROCEDURE — 77080 DXA BONE DENSITY AXIAL: CPT

## 2025-09-05 ENCOUNTER — CARE COORDINATION (OUTPATIENT)
Dept: CARE COORDINATION | Age: 85
End: 2025-09-05

## (undated) DEVICE — GLOVE SURG SZ 75 STD WHT LTX SYN POLYMER BEAD REINF ANTI RL

## (undated) DEVICE — Z DUP USE 2428662 DRESSING POSTOP AG PRISMASEAL 3.5X14IN

## (undated) DEVICE — SUTURE STRATAFIX SPRL MCRYL + SZ 2 0 L27IN ABSRB UD W NDL SXMP1B419

## (undated) DEVICE — SYSTEM COMPRESS THER UNIV COOL UNIT GRAVITY CRYO/CUFF

## (undated) DEVICE — ARM DRAPE

## (undated) DEVICE — GOWN,SIRUS,NONRNF,SETINSLV,XL,20/CS: Brand: MEDLINE

## (undated) DEVICE — SURGICAL PROCEDURE PACK LT EYE CUST ST CHARLES STRL LF DISP

## (undated) DEVICE — SOLUTION IRRIG 3000ML 0.9% SOD CHL USP UROMATIC PLAS CONT

## (undated) DEVICE — 60-7070-104 TRNQT,DPSB,PLC GREEN: Brand: MEDLINE RENEWAL

## (undated) DEVICE — Z DISCONTINUED BY MEDLINE USE 2711682 TRAY SKIN PREP DRY W/ PREM GLV

## (undated) DEVICE — GLOVE ORANGE PI 8   MSG9080

## (undated) DEVICE — BAG WND LAV 1L CLR ETH ACET ACID SOD ACETT BENZALKONIUM CHL

## (undated) DEVICE — BIT DRL L30MM DIA3.2MM DISP FOR G7 2 MOBILITY CONSTRUCT

## (undated) DEVICE — SURGICAL SUCTION CONNECTING TUBE WITH MALE CONNECTOR AND SUCTION CLAMP, 2 FT. LONG (.6 M), 5 MM I.D.: Brand: CONMED

## (undated) DEVICE — DRAPE IRRIG FLD WRM W44XL44IN W/ AORN STD PRTBL INTRATEMP

## (undated) DEVICE — SYRINGE, LUER LOCK, 10ML: Brand: MEDLINE

## (undated) DEVICE — COLLECTOR SPEC RAYON LIQ STUART DBL FOR THRT VAG SKIN WND

## (undated) DEVICE — GLOVE ORTHO 8   MSG9480

## (undated) DEVICE — SPONGE LAP W18XL18IN WHT COT 4 PLY FLD STRUNG RADPQ DISP ST 2 PER PACK

## (undated) DEVICE — SUTURE VCRL + SZ 2-0 L27IN ABSRB CLR CT-1 1/2 CIR TAPERCUT VCP259H

## (undated) DEVICE — COOLER THER 20-31IN L CRYO COMB W/ PD KNEE TB GRAV FLO

## (undated) DEVICE — SUTURE PROL SZ 1 L60IN NONABSORBABLE BLU L65MM TP-1 3/8 CIR 8824G

## (undated) DEVICE — Z DISCONTINUED USE 2424143 ADAPTER O2 SWVL CHRISTMAS TREE GRN

## (undated) DEVICE — 3M™ STERI-DRAPE™ INCISE DRAPE 1050 (60CM X 45CM): Brand: STERI-DRAPE™

## (undated) DEVICE — GLOVE SURG SZ 65 THK91MIL LTX FREE SYN POLYISOPRENE

## (undated) DEVICE — DEFENDO AIR WATER SUCTION AND BIOPSY VALVE KIT FOR  OLYMPUS: Brand: DEFENDO AIR/WATER/SUCTION AND BIOPSY VALVE

## (undated) DEVICE — BLANKET WRM W29.9XL79.1IN UP BODY FORC AIR MISTRAL-AIR

## (undated) DEVICE — COVER,LIGHT HANDLE,FLX,2/PK: Brand: MEDLINE INDUSTRIES, INC.

## (undated) DEVICE — DUAL CUT SAGITTAL BLADE

## (undated) DEVICE — JELLY,LUBE,STERILE,FLIP TOP,TUBE,2-OZ: Brand: MEDLINE

## (undated) DEVICE — BITEBLOCK 54FR W/ DENT RIM BLOX

## (undated) DEVICE — SPONGE DRN W4XL4IN RAYON/POLYESTER 6 PLY NONWOVEN PRECUT 2 PER PK

## (undated) DEVICE — ELECTRODE ES L3IN S STL BLDE INSUL DISP VALLEYLAB EDGE

## (undated) DEVICE — 2108 SERIES SAGITTAL BLADE FLARED, GROUND  (29.0 X 1.32 X 84.0MM)

## (undated) DEVICE — SUTURE PDS + SZ 2 0 L27IN ABSRB VLT L36MM CT 1 1 2 CIR PDP339H

## (undated) DEVICE — 4-PORT MANIFOLD: Brand: NEPTUNE 2

## (undated) DEVICE — GLOVE SURG 8.5 PF POLYMER WHT STRL SIGN LTX ESSENTIAL LTX

## (undated) DEVICE — NEPTUNE E-SEP SMOKE EVACUATION PENCIL, COATED, 70MM BLADE, PUSH BUTTON SWITCH: Brand: NEPTUNE E-SEP

## (undated) DEVICE — SOLUTION IRRIG 1000ML STRL H2O USP PLAS POUR BTL

## (undated) DEVICE — STRAP,CATHETER,ELASTIC,HOOK&LOOP: Brand: MEDLINE

## (undated) DEVICE — DRAPE EQUIP STAND XL MAYO CVR

## (undated) DEVICE — BLADE CLIPPER GEN PURP NS

## (undated) DEVICE — ST. CHARLES TOTAL KNEE: Brand: MEDLINE INDUSTRIES, INC.

## (undated) DEVICE — SUTURE VICRYL + SZ 2-0 L27IN ABSRB WHT SH 1/2 CIR TAPERCUT VCP417H

## (undated) DEVICE — 3M™ STERI-DRAPE™ U-DRAPE 1015: Brand: STERI-DRAPE™

## (undated) DEVICE — SUTURE VCRL SZ 0 L36IN ABSRB UD CT-1 L36MM 1/2 CIR TAPR PNT VCP946H

## (undated) DEVICE — GLOVE ORANGE PI 7   MSG9070

## (undated) DEVICE — Z CONVERTED USE 2916815 PAD PT POS 36 IN SURGYPAD DISP

## (undated) DEVICE — NEEDLE HYPO 25GA L1.5IN BLU POLYPR HUB S STL REG BVL STR

## (undated) DEVICE — TROCARS: Brand: KII® BALLOON BLUNT TIP SYSTEM

## (undated) DEVICE — SOLUTION IV IRRIG 1000ML POUR BTL 2F7114

## (undated) DEVICE — YANKAUER,OPEN TIP,W/O VENT,STERILE: Brand: MEDLINE INDUSTRIES, INC.

## (undated) DEVICE — C-ARMOR C-ARM EQUIPMENT COVERS CLEAR STERILE UNIVERSAL FIT 12 PER CASE: Brand: C-ARMOR

## (undated) DEVICE — CANISTER WND THER 500 ML NEG PRESSURE GEL TBNG STRL DISP

## (undated) DEVICE — 60-7070-106 TRNQT,DPSB,PLC BROWN: Brand: MEDLINE RENEWAL

## (undated) DEVICE — CATHETER,URETHRAL,REDRUBBER,STRL,12FR: Brand: MEDLINE INDUSTRIES, INC.

## (undated) DEVICE — DRESSING TRNSPAR W5XL4.5IN FLM SHT SEMIPERMEABLE WIND

## (undated) DEVICE — Z INACTIVE USE 2525529 CONNECTOR TBNG FOR O2

## (undated) DEVICE — LIGASURE IMPACT FT10 COMPATIBLE: Brand: MEDLINE

## (undated) DEVICE — GLOVE SURG SZ 8 L12IN FNGR THK79MIL GRN LTX FREE

## (undated) DEVICE — BANDAGE,GAUZE,BULKEE II,4.5"X4.1YD,STRL: Brand: MEDLINE

## (undated) DEVICE — SOLUTION IRRIG 1000ML 0.9% SOD CHL USP POUR PLAS BTL

## (undated) DEVICE — SOLUTION IRRIGATION BAL SALT SOLUTION 500 ML STRL BSS

## (undated) DEVICE — DRESSING ALGINATE POST OPERATIVE 12X3.5 IN RECT PRIMASEAL

## (undated) DEVICE — SYRINGE IRRIG 60ML SFT PLIABLE BLB EZ TO GRP 1 HND USE W/

## (undated) DEVICE — TIP COVER ACCESSORY

## (undated) DEVICE — HANDPIECE SET WITH BONE CLEANING TIP AND SUCTION TUBE: Brand: INTERPULSE

## (undated) DEVICE — DRESSING NEG PRESSURE INCISION MGMT SYS 90 CM KIT PREVENA +

## (undated) DEVICE — GOWN,POLY REINFORCED,LG: Brand: MEDLINE

## (undated) DEVICE — GOWN,AURORA,NONREINFORCED,LARGE: Brand: MEDLINE

## (undated) DEVICE — STRAP,POSITIONING,KNEE/BODY,FOAM,4X60": Brand: MEDLINE

## (undated) DEVICE — SUTURE STRATAFIX SYMMETRIC PDS + SZ 1 L18IN ABSRB VLT L48MM SXPP1A400

## (undated) DEVICE — SCRUB DYNAHEX  4% CHG  8 OZ BOTTLE  24 EA

## (undated) DEVICE — 450 ML BOTTLE OF 0.05% CHLORHEXIDINE GLUCONATE IN 99.95% STERILE WATER FOR IRRIGATION, USP AND APPLICATOR.: Brand: IRRISEPT ANTIMICROBIAL WOUND LAVAGE

## (undated) DEVICE — SUTURE VCRL + SZ 2 L27IN ABSRB UD L40MM CP 1/2 CIR REV CUT VCP195H

## (undated) DEVICE — YANKAUER,POOLE TIP,STERILE,50/CS: Brand: MEDLINE

## (undated) DEVICE — Device

## (undated) DEVICE — KIT SEP W/ BLD DRAW TB SYR NDL TRNQT PD

## (undated) DEVICE — SEAL

## (undated) DEVICE — BANDAGE,SELF ADHRNT,COFLEX,4"X5YD,STRL: Brand: COLABEL

## (undated) DEVICE — PAD,ABDOMINAL,5"X9",ST,LF,25/BX: Brand: MEDLINE INDUSTRIES, INC.

## (undated) DEVICE — BRACE ORTH HINGED POST OPERATIVE DONJOY XACT ROM LT

## (undated) DEVICE — LIQUIBAND RAPID ADHESIVE 36/CS 0.8ML: Brand: MEDLINE

## (undated) DEVICE — DRAINBAG,ANTI-REFLUX TOWER,L/F,2000ML,LL: Brand: MEDLINE

## (undated) DEVICE — CATHETER URETH 16FR BLLN 5CC SIL ALLY W/ SIL HYDRGEL 2 W F

## (undated) DEVICE — SYRINGE MED 10ML LUERLOCK TIP W/O SFTY DISP

## (undated) DEVICE — SUTURE FIBERWIRE SZ 5 L38IN NONABSORBABLE BLU L48MM 1/2 AR7211

## (undated) DEVICE — NO USE 18 MONTHS GOWN STD LG  1153D

## (undated) DEVICE — BANDAGE COBAN 6 IN WND 6INX5YD FOAM

## (undated) DEVICE — CONTAINER,SPECIMEN,4OZ,OR STRL: Brand: MEDLINE

## (undated) DEVICE — SYSTEM FLD COLL W/ FLX DRP SUPP AND DISP BG

## (undated) DEVICE — CEMENT MIXING SYSTEM WITH FEMORAL BREAKWAY NOZZLE: Brand: REVOLUTION

## (undated) DEVICE — PROTECTOR ULN NRV PUR FOAM HK LOOP STRP ANATOMICALLY

## (undated) DEVICE — CONTAINER,SPEC,PNEUM TUBE,3OZ,STRL PATH: Brand: MEDLINE

## (undated) DEVICE — KIT AUTOTRNS APPL AERO 2 SET SYR 2 TIP FOR PLT SEP SYS GPS

## (undated) DEVICE — TOWEL,OR,DSP,ST,WHITE,DLX,2/PK,40PK/CS: Brand: MEDLINE

## (undated) DEVICE — DRAPE,REIN 53X77,STERILE: Brand: MEDLINE

## (undated) DEVICE — SOLUTION IRRIGATION STRL H2O 3000 ML 4/CA

## (undated) DEVICE — SWAB CULT CLR BLU PLAS RAYON LIQ AMIES AERB ANAERB FRIC CAP

## (undated) DEVICE — SHEET,DRAPE,40X58,STERILE: Brand: MEDLINE

## (undated) DEVICE — PENCIL ES L3M BTTN SWCH HOLSTER W/ BLDE ELECTRD EDGE

## (undated) DEVICE — SUTURE VICRYL + SZ 3-0 L27IN ABSRB UD L26MM SH 1/2 CIR VCP416H

## (undated) DEVICE — GLOVE SURG SZ 85 L12IN FNGR THK79MIL GRN LTX FREE

## (undated) DEVICE — TUBING, SUCTION, 9/32" X 20', STRAIGHT: Brand: MEDLINE INDUSTRIES, INC.

## (undated) DEVICE — DRESSING PETRO W3XL8IN OIL EMUL N ADH GZ KNIT IMPREG CELOS

## (undated) DEVICE — TOWEL,OR,DSP,ST,BLUE,STD,6/PK,12PK/CS: Brand: MEDLINE

## (undated) DEVICE — CLOSURE SKIN FLX NONINVASIVE PRELOC TECHNOLOGY FOR 24IN

## (undated) DEVICE — GAUZE,SPONGE,4"X4",16PLY,XRAY,STRL,LF: Brand: MEDLINE

## (undated) DEVICE — DEVICE TRCR 12X9X3IN WHT CLSR DISP OMNICLOSE

## (undated) DEVICE — Z INACTIVE USE 2660664 SOLUTION IRRIG 3000ML 0.9% SOD CHL USP UROMATIC PLAS CONT

## (undated) DEVICE — TOWEL,OR,DSP,ST,NATURAL,DLX,4/PK,20PK/CS: Brand: MEDLINE

## (undated) DEVICE — SUTURE VCRL + SZ 2-0 L27IN ABSRB UD CP-1 1/2 CIR REV CUT VCP266H

## (undated) DEVICE — TIP SUCTION YANKAUER STD FLX W/ FLANGE NO VENT STRL

## (undated) DEVICE — ELECTRODE ES L 6.5IN BLDE MPLR OPN APPRCH EZ TO CLN

## (undated) DEVICE — TROCAR: Brand: KII FIOS FIRST ENTRY

## (undated) DEVICE — INTRODUCER CATH 16FR ORNG SUPRPUB PLAS SHRP TIP BVL TRCR

## (undated) DEVICE — MASTISOL ADHESIVE LIQ 2/3ML

## (undated) DEVICE — MHPB BASIC LAP PACK: Brand: MEDLINE INDUSTRIES, INC.

## (undated) DEVICE — SUTURE NONABSORBABLE MONOFILAMENT 2-0 FS 18 IN ETHILON 664H

## (undated) DEVICE — SUTURE ABSORBABLE MONOFILAMENT 0 CTX 60 IN VIO PDS + PDP990G

## (undated) DEVICE — THE MONARCH® "D" CARTRIDGE IS A SINGLE-USE POLYPROPYLENE CARTRIDGE FOR POSTERIOR CHAMBER IOL DELIVERY: Brand: MONARCH® III

## (undated) DEVICE — TUBING, SUCTION, 3/16" X 10', STRAIGHT: Brand: MEDLINE

## (undated) DEVICE — HIGH FLOW TIP

## (undated) DEVICE — SUTURE MCRYL SZ 3-0 L27IN ABSRB UD PS-2 3/8 CIR REV CUT NDL MCP427H

## (undated) DEVICE — ST CHARLES TOTAL HIP: Brand: MEDLINE INDUSTRIES, INC.

## (undated) DEVICE — WIPES SKIN CLOTH READYPREP 9 X 10.5 IN 2% CHLORHEX GLUCONATE CHG PREOP

## (undated) DEVICE — TRI-LUMEN FILTERED TUBE SET WITH ACTIVATED CHARCOAL FILTER: Brand: AIRSEAL

## (undated) DEVICE — ZIMMER® STERILE DISPOSABLE TOURNIQUET CUFF WITH PLC, DUAL PORT, SINGLE BLADDER, 30 IN. (76 CM)

## (undated) DEVICE — CANISTER NEG PRSS 500ML WND THER W/ TBNG NO PRSS RANG W/

## (undated) DEVICE — BLADELESS OBTURATOR: Brand: WECK VISTA

## (undated) DEVICE — NEEDLE, QUINCKE 22GX7": Brand: MEDLINE

## (undated) DEVICE — INTENDED FOR TISSUE SEPARATION, AND OTHER PROCEDURES THAT REQUIRE A SHARP SURGICAL BLADE TO PUNCTURE OR CUT.: Brand: BARD-PARKER ® CARBON RIB-BACK BLADES

## (undated) DEVICE — AIRSEAL 12 MM ACCESS PORT AND PALM GRIP OBTURATOR WITH BLADELESS OPTICAL TIP, 120 MM LENGTH: Brand: AIRSEAL

## (undated) DEVICE — SUTURE MCRYL + SZ 3-0 L27IN ABSRB UD PS1 L24MM 3/8 CIR REV MCP936H

## (undated) DEVICE — DRAPE,U/ SHT,SPLIT,PLAS,STERIL: Brand: MEDLINE

## (undated) DEVICE — UNIT WOUND VACUUM PUMP THERAPY VAC ATS

## (undated) DEVICE — PREP SOL PVP IODINE 4%  4 OZ/BTL

## (undated) DEVICE — Z DUPLICATE USE 2527422 TUBING O2 STD 7 FT EXTN NO CRUSH VISUAL CNTRST LF

## (undated) DEVICE — SHEET, ORTHO, SPLIT, STERILE: Brand: MEDLINE

## (undated) DEVICE — DRAPE,TOP,102X53,STERILE: Brand: MEDLINE

## (undated) DEVICE — GLOVE ORANGE PI 7 1/2   MSG9075

## (undated) DEVICE — COVER,MAYO STAND,STERILE: Brand: MEDLINE

## (undated) DEVICE — PREMIUM DRY TRAY LF: Brand: MEDLINE INDUSTRIES, INC.

## (undated) DEVICE — SUTURE ETHLN SZ 2-0 L18IN NONABSORBABLE BLK L26MM FS 3/8 664G

## (undated) DEVICE — SUTURE VICRYL + SZ 0 L27IN ABSRB VLT L26MM UR-6 5/8 CIR VCP603H

## (undated) DEVICE — STRAP ARMBRD W1.5XL32IN FOAM STR YET SFT W/ HK AND LOOP

## (undated) DEVICE — 1010 S-DRAPE TOWEL DRAPE 10/BX: Brand: STERI-DRAPE™

## (undated) DEVICE — CYSTO/BLADDER IRRIGATION SET, REGULATING CLAMP

## (undated) DEVICE — MHPB CONVERSION PACK: Brand: MEDLINE INDUSTRIES, INC.

## (undated) DEVICE — MERCY HEALTH ST CHARLES: Brand: MEDLINE INDUSTRIES, INC.

## (undated) DEVICE — GLOVE ORANGE PI 8 1/2   MSG9085

## (undated) DEVICE — INTENDED TO SUPPORT AND MAINTAIN THE POSITION OF AN ANESTHETIZED PATIENT DURING SURGERY: Brand: HERMANTOR XL PINK KNEE POSITIONING PAD

## (undated) DEVICE — TOWEL,OR,DSP,ST,BLUE,DLX,XR,4/PK,20PK/CS: Brand: MEDLINE

## (undated) DEVICE — FORCEPS BX L240CM JAW DIA2.4MM ORNG L CAP W/ NDL DISP RAD

## (undated) DEVICE — SUTURE PROL SZ 1 L30IN NONABSORBABLE BLU L36MM CT-1 1/2 CIR 8425H

## (undated) DEVICE — CANNULA NSL AD TBNG L7FT PVC STR NONFLARED PRNG O2 DEL W STD

## (undated) DEVICE — MHPB CYSTO PACK-LF: Brand: MEDLINE INDUSTRIES, INC.

## (undated) DEVICE — GAUZE,SPONGE,4"X4",16PLY,STRL,LF,10/TRAY: Brand: MEDLINE

## (undated) DEVICE — SOLUTION IRRIG 3000ML STRL H2O USP UROMATIC PLAS CONT

## (undated) DEVICE — GLOVE SURG SZ 7 L12IN THK7.5MIL DK GRN LTX FREE MSG6570] MEDLINE INDUSTRIES INC]

## (undated) DEVICE — DRSG POSTOP PRMSL AG 3.5X14IN

## (undated) DEVICE — BANDAGE COMPR W6INXL15YD WHT BGE POLY COT WV E HK LOOP CLSR

## (undated) DEVICE — 3M™ IOBAN™ 2 ANTIMICROBIAL INCISE DRAPE 6650EZ: Brand: IOBAN™ 2

## (undated) DEVICE — DRESSING NEG PRSS L W15XH3.3XL26CM BLK POLYUR DRP PD

## (undated) DEVICE — DRESSING GZ W3XL16IN CELOS ACETT OIL EMUL N ADH

## (undated) DEVICE — SOLUTION IV IRRIG POUR BRL 0.9% SODIUM CHL 2F7124

## (undated) DEVICE — 2108 SERIES SAGITTAL BLADE, NO OFFSET  (24.8 X 1.32 X 87.3MM)

## (undated) DEVICE — APPLICATOR MEDICATED 10.5 CC SOLUTION HI LT ORNG CHLORAPREP

## (undated) DEVICE — SUTURE PERMAHAND SZ 3-0 L30IN NONABSORBABLE BLK SH L26MM K832H

## (undated) DEVICE — SOLUTION IRRIG 1000ML 09% SOD CHL USP PIC PLAS CONTAINER

## (undated) DEVICE — APPLICATOR MEDICATED 26 CC SOLUTION HI LT ORNG CHLORAPREP

## (undated) DEVICE — COUNTER NDL 10 COUNT HLD 20 FOAM BLK SGL MAG

## (undated) DEVICE — SUTURE ETHILON SZ 3-0 L18IN NONABSORBABLE BLK PS-2 L19MM 3/8 1669H